# Patient Record
Sex: FEMALE | Race: BLACK OR AFRICAN AMERICAN | NOT HISPANIC OR LATINO | Employment: OTHER | URBAN - METROPOLITAN AREA
[De-identification: names, ages, dates, MRNs, and addresses within clinical notes are randomized per-mention and may not be internally consistent; named-entity substitution may affect disease eponyms.]

---

## 2015-09-12 LAB
CREATININE URINE RANDOM: 13.3 MG/DL
MICROALBUMIN, RAND UR - HISTORICAL: 9.66 MG/DL (ref 0–1.99)
MICROALBUMIN/CR RATIO URINE: 726.3 MG/DL

## 2017-01-08 ENCOUNTER — AMBULATORY - HEALTHEAST (OUTPATIENT)
Dept: GERIATRICS | Facility: CLINIC | Age: 67
End: 2017-01-08

## 2017-01-11 ENCOUNTER — AMBULATORY - HEALTHEAST (OUTPATIENT)
Dept: GERIATRICS | Facility: CLINIC | Age: 67
End: 2017-01-11

## 2017-01-25 ENCOUNTER — AMBULATORY - HEALTHEAST (OUTPATIENT)
Dept: GERIATRICS | Facility: CLINIC | Age: 67
End: 2017-01-25

## 2017-01-31 ENCOUNTER — OFFICE VISIT - HEALTHEAST (OUTPATIENT)
Dept: GERIATRICS | Facility: CLINIC | Age: 67
End: 2017-01-31

## 2017-01-31 DIAGNOSIS — N18.2 CKD (CHRONIC KIDNEY DISEASE), STAGE 2 (MILD): ICD-10-CM

## 2017-01-31 DIAGNOSIS — I10 ESSENTIAL HYPERTENSION: ICD-10-CM

## 2017-01-31 DIAGNOSIS — G62.9 NEUROPATHY: ICD-10-CM

## 2017-02-06 ENCOUNTER — TELEPHONE (OUTPATIENT)
Dept: ENDOCRINOLOGY | Facility: CLINIC | Age: 67
End: 2017-02-06

## 2017-02-15 ENCOUNTER — AMBULATORY - HEALTHEAST (OUTPATIENT)
Dept: GERIATRICS | Facility: CLINIC | Age: 67
End: 2017-02-15

## 2017-02-27 ENCOUNTER — OFFICE VISIT (OUTPATIENT)
Dept: ENDOCRINOLOGY | Facility: CLINIC | Age: 67
End: 2017-02-27

## 2017-02-27 VITALS — SYSTOLIC BLOOD PRESSURE: 138 MMHG | HEART RATE: 73 BPM | DIASTOLIC BLOOD PRESSURE: 84 MMHG | HEIGHT: 63 IN

## 2017-02-27 DIAGNOSIS — E11.8 TYPE 2 DIABETES MELLITUS WITH COMPLICATION, WITH LONG-TERM CURRENT USE OF INSULIN (H): Primary | ICD-10-CM

## 2017-02-27 DIAGNOSIS — Z79.4 TYPE 2 DIABETES MELLITUS WITH COMPLICATION, WITH LONG-TERM CURRENT USE OF INSULIN (H): Primary | ICD-10-CM

## 2017-02-27 LAB — HBA1C MFR BLD: 7.1 % (ref 4.3–6)

## 2017-02-27 ASSESSMENT — PAIN SCALES - GENERAL: PAINLEVEL: NO PAIN (0)

## 2017-02-27 NOTE — LETTER
2/27/2017       RE: Cristal Preciado  [facility]  [address]   [place] [address]     Dear Colleague,    Thank you for referring your patient, Cristal Preciado, to the [facility] ENDOCRINOLOGY at [facility]. Please see a copy of my visit note below.    HPI   Ms. Preciado returns for follow up of type 2 diabetes.  Blood sugars have been running mostly under 200 lately.  Her caretakers at [facility] have been testing her blood sugars 4 times a day.  She is currently taking Lantus 100 units in the morning and 100 units at night and Novolog 75 units at breakfast, 72 units with lunch and 68 with dinner plus sliding scale (2/20 over 180 starting at 4 units).   She is also on liraglutide 1.8 mg daily and is tolerating this fine.  She is taking gabapentin for neuropathy.  She is also on Cymbalta.  Neuropathy in feet better, but hands still bothering her.  She is seeing Dr. Grant for her feet.  She otherwise has been feeling well and in her usual state of health.       At her last visit, Ms. Preciado reports that she was frustrated.  She feels like she is not challenged at all at the nursing home and that she is expected to sit in the wheelchair most of the time.  The therapy she receives is not strengthening her legs, only her upper body and she is concerned because they will not allow her to try to walk.  She feels like her muscles are just weak and she needs exercise.  She does not like the primary care she receives at the nursing home and has been told that she should not see Dr. Benavides (as she has for several years) because it is better to see the providers at the NH.  She said that when she entered the NH 4-5 years ago she was walking and her goal is to start walking again.  She does have some ongoing back pain that is limiting her also.     ROS   GENERAL: Lost a few pounds.  No fevers, chills,  night sweats.  HEENT: no dysphagia, diplopia, neck  "pain or tenderness, dry/scratchy eyes, URI, cough, sinus drainage, tinnitus, sinus pressure.   CV: No CP, SOB.  No palpitations, skipped beats, LOC.  LUNGS: no cough, sputum production, wheezing   ABDOMEN: no diarrhea, constipation, abdominal pain  EXTREMITIES:  She has ongoing edema.  Hands and feet feel swollen.  Has thickened toenails, not cutting into skin.  No pain. She sees Dr. Grant.  Dry skin.  Leg ulcerations healed.  Callus on left heel.   NEUROLOGY: no changes in vision.  Continued tingling and numbness in hands and feet.   MSK: weakness in legs after stroke.  Needs help getting out of wheelchair. Does not use walker any more due to weakness.      PMH   Type 2 diabetes  Neuropathy   Nephropathy  Stroke - uses a walker, but mainly in wheelchair.   CAD, s/p stent   HTN  Dyslipidemia  Cataracts  GERD    Family Hx:   Mom- stroke  Dad- cancer  1 of 12 children  2 brothers and 2 sisters-  cancer- unsure of type  1 sister with diabetes  5 children  Son- MS  Daughter- MS  6 grandchildren    Social Hx:   Ms. Preciado lives at [facility].  She keeps herself busy with many activities in the day, exercises (\"light and lively\"), crafts, coloring, baking, light bowling. She has many friends in their 90's there and she enjoys their company. She is seeing her family about 1-2 times a month now.     Has 5 children, all now living in [facility].  6 grandchildren.  4 grandchildren moved south with their mother.  Originally from [place].     Current Medications  Current Outpatient Prescriptions   Medication Sig Dispense Refill     olopatadine HCl (PATADAY) 0.2 % SOLN Place 1 drop into both eyes daily as needed 1 Bottle 11     insulin glargine (LANTUS SOLOSTAR) 100 UNIT/ML PEN Take 80 units in the morning and 90 units in the evening. 60 mL 11     Insulin Lispro, Human, (HUMALOG KWIKPEN) 200 UNIT/ML soln Inject 75 units with breakfast: 65 units with lunch: 65 units with dinner plus " correction scale approx daily use: 225 units 36 mL 11     insulin pen needle (B-D U/F) 31G X 5 MM Use 5 time(s) per day.  Please dispense as BD Pen Needle Mini U/F 31G x 5  each 11     Pregabalin (LYRICA PO)        pimecrolimus (ELIDEL) 1 % cream Apply topically 2 times daily To areas of rash at ears as needed until clear. 60 g 11     triamcinolone (KENALOG) 0.1 % ointment Apply topically daily To legs under compression stockings for stasis dermatitis discoloration. 60 g 5     Elastic Bandages & Supports (JOBST KNEE HIGH COMPRESSION SM) MISC JOBST 20-30MMHG COMPRESSION SM MISC   To both legs during waking hours daily for leg swelling and venous stasis dermatitis. Do not sleep in stockings. 2 each 3     Podiatric Products (AMLACTIN FOOT CREAM THERAPY EX) Externally apply 12 % topically       artificial tears OINT ophthalmic ointment 0.25 inches At Bedtime       calcium-vitamin D 500-125 MG-UNIT TABS Take 1 tablet by mouth 2 times daily       UNABLE TO FIND MEDICATION NAME: milk of magnesia       minocycline (MINOCIN,DYNACIN) 100 MG capsule Take 1 capsule (100 mg) by mouth 2 times daily 28 capsule 5     ketoconazole (NIZORAL) 2 % shampoo To entire wet scalp and ears and then wash off after 5 minutes three times a week. 240 mL 11     clobetasol (TEMOVATE) 0.05 % external solution Apply topically 2 times daily To itchy and scaly areas of scalp as needed. 60 mL 11     ketoconazole (NIZORAL) 2 % cream Apply topically 2 times daily To affected right ear mixed with triamcinolone ointment until return to clinic. 60 g 5     triamcinolone (KENALOG) 0.1 % ointment Apply topically 2 times daily To rash on the right ear mixed with ketoconazole cream until return to clinic. 60 g 5     gabapentin (NEURONTIN) 600 MG tablet Take 600 mg in AM , 600 mg afternoon and 900 mg at HS. 90 tablet 1     DULoxetine (CYMBALTA) 30 MG capsule Take 1 capsule (30 mg) by mouth daily 30 capsule 4     clotrimazole (LOTRIMIN) 1 % cream To toenails  twice daily. 60 g 6     dorzolamide-timolol (COSOPT) 2-0.5 % ophthalmic solution Place 1 drop into both eyes 2 times daily       ranitidine (ZANTAC) 150 MG tablet Take 150 mg by mouth 2 times daily       mometasone (ELOCON) 0.1 % cream Apply sparingly to left medial ankle area daily.  Do not apply to face. 45 g 0     carboxymethylcellulose (REFRESH PLUS) 0.5 % SOLN 1 drop 3 times daily as needed.       omeprazole (PRILOSEC) 20 MG capsule Take  by mouth daily. Take one tab daily       sodium chloride (SODIUM CHLORIDE) 0.65 % nasal spray Spray 1 spray in nostril daily as needed.       lisinopril (PRINIVIL,ZESTRIL) 5 MG tablet Take 2 tablets by mouth daily. Take one tab daily/Hold for SBP < 110 90 tablet 3     COMPRESSION STOCKINGS 1 each daily. 1 each 0     latanoprost (XALATAN) 0.005 % ophthalmic solution 1 drop At Bedtime. Left eye       allopurinol (ZYLOPRIM) 100 MG tablet Take 2 tablets by mouth daily. 180 tablet 1     hydrocortisone 1 % cream Apply  topically 2 times daily.       hydrocortisone 2.5 % cream Apply  topically 2 times daily.       liraglutide (VICTOZA) 18 MG/3ML SOLN Inject 1.8 mg Subcutaneous daily. Start with 0.6 mg x 5 days, then increase to 1.2 mg x 5 days, then 1.8 mg thereafter.  Do not advance to higher dose if you have nausea. 3 Month 3     ibuprofen (ADVIL,MOTRIN) 400 MG tablet Take 400 mg by mouth every 6 hours as needed.       chlorthalidone (HYGROTEN) 12.5 MG TABS Take 12.5 mg by mouth daily.       loratadine (CLARITIN) 10 MG tablet Take 10 mg by mouth as needed.       Hypromellose (ARTIFICIAL TEARS OP) Apply  to eye as needed.       Calcium Citrate-Vitamin D (CALCIUM CITRATE + D PO) 600/400 mg/unit take 1 tabs daily twice daily.        nitroGLYCERIN (NITROSTAT) 0.4 MG SL tablet Place 0.4 mg under the tongue every 5 minutes as needed.       acetaminophen (TYLENOL) 500 MG tablet Take 1-2 tablets by mouth every 6 hours as needed. Tylenol Extra Strength.        Magnesium Hydroxide (MILK OF  "MAGNESIA PO) Take 30 cc as needed PRN constipation.       atorvastatin (LIPITOR) 80 MG tablet Take 1 tablet by mouth daily. Pt needs to have labs done prior to further refills. 90 tablet 0     folic acid (FOLVITE) 1 MG tablet Take 1 mg by mouth daily.       metoprolol (LOPRESSOR) 50 MG tablet Take 50 mg by mouth 2 times daily.       aspirin 325 MG tablet Take 325 mg by mouth daily.       clopidogrel (PLAVIX) 75 MG tablet Take 75 mg by mouth daily.         Physical Exam   /84  Pulse 73  Ht 1.6 m (5' 3\")  GENERAL: VSS.  Answering questions appropriately, appears stated age. Sitting in wheelchair.   LUNGS: CTA bilaterally. No wheezes, rales, ronchi  EXTREMITIES: Legs with TYREL hose. 1+ edema bilaterally.    RESULTS  A1C      7.5   10/24/2016  A1C      7.5   10/12/2015  A1C      8.1   3/6/2014  A1C      7.2   3/7/2013  A1C      7.7   2/13/2013    TSH   Date Value Ref Range Status   10/24/2016 1.20 0.40 - 4.00 mU/L Final   10/12/2015 1.18 0.40 - 4.00 mU/L Final   08/21/2014 1.24 0.40 - 4.00 mU/L Final     Comment:     Effective 7/30/2014, the reference range for this assay has changed to reflect   new instrumentation/methodology.     08/05/2013 1.12 0.4 - 5.0 mU/L Final   07/15/2010 0.53 0.4 - 5.0 mU/L Final     T4 Total   Date Value Ref Range Status   10/30/2008 10.6 5.0 - 11.0 ug/dL Final     T4 Free   Date Value Ref Range Status   04/21/2006 1.04 0.70 - 1.85 ng/dL Final   09/23/2005 1.58 0.70 - 1.85 ng/dL Final       Creatinine   Date Value Ref Range Status   10/24/2016 0.99 0.52 - 1.04 mg/dL Final   10/12/2015 0.88 0.52 - 1.04 mg/dL Final   ]    No results found for: MICROALBUMIN]    ALT   Date Value Ref Range Status   10/12/2015 39 0 - 50 U/L Final   03/06/2014 36 0 - 50 U/L Final   ]    Recent Labs   Lab Test  10/12/15   9746  08/21/14   0935   05/10/12   1022   CHOL   --   110   --   144   HDL   --   43*   --   46*   LDL  51  43   < >  73   TRIG   --   122   --   123   CHOLHDLRATIO   --   2.6   --   3.1    < " > = values in this interval not displayed.     Assessment/Plan:      1.  Type 2 diabetes-  Blood sugars are under good control.  Her a1c is down to 7.1%.  I think we are at target and do not need very tight control (goal is a1c <8%) based on her risk factors and results of studies including the ACCORD trial.  Asked Jake Busch to call if she starts having hypoglycemia. No other changes today.     2.  Risk factors- BP good today.  No microalbuminuria.   On ACE inhibitor.  Creatinine ok.  LDL <100 on statin.  Follows with podiatry.  On gabapentin and cymbalta for neuropathy.  Will schedule with Dr. Grant. Her nursing home wanted her to follow with the in house doctor, but she prefers to follow with Dr. Benavides for primary care.  She will contact her , as she was told by Jake Busch that she can not see Dr. Benavides despite her concern that she is not getting the care she needs.       3.  F/U in 3 months with me, sooner if she has any concerns.      >25/30 minutes were spent counseling patient    Renetta Washington PA-C, MPAS   [facility]  Department of Medicine  Division of Endocrinology and Diabetes

## 2017-02-27 NOTE — PROGRESS NOTES
HPI   Ms. Preciado returns for follow up of type 2 diabetes.  Blood sugars have been running mostly under 200 lately.  Her caretakers at [facility] have been testing her blood sugars 4 times a day.  She is currently taking Lantus 100 units in the morning and 100 units at night and Novolog 75 units at breakfast, 72 units with lunch and 68 with dinner plus sliding scale (2/20 over 180 starting at 4 units).   She is also on liraglutide 1.8 mg daily and is tolerating this fine.  She is taking gabapentin for neuropathy.  She is also on Cymbalta.  Neuropathy in feet better, but hands still bothering her.  She is seeing Dr. Grant for her feet.  She otherwise has been feeling well and in her usual state of health.       At her last visit, Ms. Preciado reports that she was frustrated.  She feels like she is not challenged at all at the nursing home and that she is expected to sit in the wheelchair most of the time.  The therapy she receives is not strengthening her legs, only her upper body and she is concerned because they will not allow her to try to walk.  She feels like her muscles are just weak and she needs exercise.  She does not like the primary care she receives at the nursing home and has been told that she should not see Dr. Benavides (as she has for several years) because it is better to see the providers at the NH.  She said that when she entered the NH 4-5 years ago she was walking and her goal is to start walking again.  She does have some ongoing back pain that is limiting her also.     ROS   GENERAL: Lost a few pounds.  No fevers, chills,  night sweats.  HEENT: no dysphagia, diplopia, neck pain or tenderness, dry/scratchy eyes, URI, cough, sinus drainage, tinnitus, sinus pressure.   CV: No CP, SOB.  No palpitations, skipped beats, LOC.  LUNGS: no cough, sputum production, wheezing   ABDOMEN: no diarrhea, constipation, abdominal pain  EXTREMITIES:  She has ongoing edema.  Hands and feet feel swollen.  Has  "thickened toenails, not cutting into skin.  No pain. She sees Dr. Grant.  Dry skin.  Leg ulcerations healed.  Callus on left heel.   NEUROLOGY: no changes in vision.  Continued tingling and numbness in hands and feet.   MSK: weakness in legs after stroke.  Needs help getting out of wheelchair. Does not use walker any more due to weakness.      PMH   Type 2 diabetes  Neuropathy   Nephropathy  Stroke 2010- uses a walker, but mainly in wheelchair.   CAD, s/p stent 2009  HTN  Dyslipidemia  Cataracts  GERD    Family Hx:   Mom- stroke  Dad- cancer  1 of 12 children  2 brothers and 2 sisters-  cancer- unsure of type  1 sister with diabetes  5 children  Son- MS  Daughter- MS  6 grandchildren    Social Hx:   Ms. Preciado lives at [facility].  She keeps herself busy with many activities in the day, exercises (\"light and lively\"), crafts, coloring, baking, light bowling. She has many friends in their 90's there and she enjoys their company. She is seeing her family about 1-2 times a month now.     Has 5 children, all now living in [facility].  6 grandchildren.  4 grandchildren moved south with their mother.  Originally from [facility] [place] [facility].     Current Medications  Current Outpatient Prescriptions   Medication Sig Dispense Refill     olopatadine HCl (PATADAY) 0.2 % SOLN Place 1 drop into both eyes daily as needed 1 Bottle 11     insulin glargine (LANTUS SOLOSTAR) 100 UNIT/ML PEN Take 80 units in the morning and 90 units in the evening. 60 mL 11     Insulin Lispro, Human, (HUMALOG KWIKPEN) 200 UNIT/ML soln Inject 75 units with breakfast: 65 units with lunch: 65 units with dinner plus correction scale approx daily use: 225 units 36 mL 11     insulin pen needle (B-D U/F) 31G X 5 MM Use 5 time(s) per day.  Please dispense as BD Pen Needle Mini U/F 31G x 5  each 11     Pregabalin (LYRICA PO)        pimecrolimus (ELIDEL) 1 % cream Apply topically 2 times daily To areas of rash at ears as needed until " clear. 60 g 11     triamcinolone (KENALOG) 0.1 % ointment Apply topically daily To legs under compression stockings for stasis dermatitis discoloration. 60 g 5     Elastic Bandages & Supports (JOBST KNEE HIGH COMPRESSION SM) MISC JOBST 20-30MMHG COMPRESSION SM MISC   To both legs during waking hours daily for leg swelling and venous stasis dermatitis. Do not sleep in stockings. 2 each 3     Podiatric Products (AMLACTIN FOOT CREAM THERAPY EX) Externally apply 12 % topically       artificial tears OINT ophthalmic ointment 0.25 inches At Bedtime       calcium-vitamin D 500-125 MG-UNIT TABS Take 1 tablet by mouth 2 times daily       UNABLE TO FIND MEDICATION NAME: milk of magnesia       minocycline (MINOCIN,DYNACIN) 100 MG capsule Take 1 capsule (100 mg) by mouth 2 times daily 28 capsule 5     ketoconazole (NIZORAL) 2 % shampoo To entire wet scalp and ears and then wash off after 5 minutes three times a week. 240 mL 11     clobetasol (TEMOVATE) 0.05 % external solution Apply topically 2 times daily To itchy and scaly areas of scalp as needed. 60 mL 11     ketoconazole (NIZORAL) 2 % cream Apply topically 2 times daily To affected right ear mixed with triamcinolone ointment until return to clinic. 60 g 5     triamcinolone (KENALOG) 0.1 % ointment Apply topically 2 times daily To rash on the right ear mixed with ketoconazole cream until return to clinic. 60 g 5     gabapentin (NEURONTIN) 600 MG tablet Take 600 mg in AM , 600 mg afternoon and 900 mg at HS. 90 tablet 1     DULoxetine (CYMBALTA) 30 MG capsule Take 1 capsule (30 mg) by mouth daily 30 capsule 4     clotrimazole (LOTRIMIN) 1 % cream To toenails twice daily. 60 g 6     dorzolamide-timolol (COSOPT) 2-0.5 % ophthalmic solution Place 1 drop into both eyes 2 times daily       ranitidine (ZANTAC) 150 MG tablet Take 150 mg by mouth 2 times daily       mometasone (ELOCON) 0.1 % cream Apply sparingly to left medial ankle area daily.  Do not apply to face. 45 g 0      carboxymethylcellulose (REFRESH PLUS) 0.5 % SOLN 1 drop 3 times daily as needed.       omeprazole (PRILOSEC) 20 MG capsule Take  by mouth daily. Take one tab daily       sodium chloride (SODIUM CHLORIDE) 0.65 % nasal spray Spray 1 spray in nostril daily as needed.       lisinopril (PRINIVIL,ZESTRIL) 5 MG tablet Take 2 tablets by mouth daily. Take one tab daily/Hold for SBP < 110 90 tablet 3     COMPRESSION STOCKINGS 1 each daily. 1 each 0     latanoprost (XALATAN) 0.005 % ophthalmic solution 1 drop At Bedtime. Left eye       allopurinol (ZYLOPRIM) 100 MG tablet Take 2 tablets by mouth daily. 180 tablet 1     hydrocortisone 1 % cream Apply  topically 2 times daily.       hydrocortisone 2.5 % cream Apply  topically 2 times daily.       liraglutide (VICTOZA) 18 MG/3ML SOLN Inject 1.8 mg Subcutaneous daily. Start with 0.6 mg x 5 days, then increase to 1.2 mg x 5 days, then 1.8 mg thereafter.  Do not advance to higher dose if you have nausea. 3 Month 3     ibuprofen (ADVIL,MOTRIN) 400 MG tablet Take 400 mg by mouth every 6 hours as needed.       chlorthalidone (HYGROTEN) 12.5 MG TABS Take 12.5 mg by mouth daily.       loratadine (CLARITIN) 10 MG tablet Take 10 mg by mouth as needed.       Hypromellose (ARTIFICIAL TEARS OP) Apply  to eye as needed.       Calcium Citrate-Vitamin D (CALCIUM CITRATE + D PO) 600/400 mg/unit take 1 tabs daily twice daily.        nitroGLYCERIN (NITROSTAT) 0.4 MG SL tablet Place 0.4 mg under the tongue every 5 minutes as needed.       acetaminophen (TYLENOL) 500 MG tablet Take 1-2 tablets by mouth every 6 hours as needed. Tylenol Extra Strength.        Magnesium Hydroxide (MILK OF MAGNESIA PO) Take 30 cc as needed PRN constipation.       atorvastatin (LIPITOR) 80 MG tablet Take 1 tablet by mouth daily. Pt needs to have labs done prior to further refills. 90 tablet 0     folic acid (FOLVITE) 1 MG tablet Take 1 mg by mouth daily.       metoprolol (LOPRESSOR) 50 MG tablet Take 50 mg by mouth 2  "times daily.       aspirin 325 MG tablet Take 325 mg by mouth daily.       clopidogrel (PLAVIX) 75 MG tablet Take 75 mg by mouth daily.         Physical Exam   /84  Pulse 73  Ht 1.6 m (5' 3\")  GENERAL: VSS.  Answering questions appropriately, appears stated age. Sitting in wheelchair.   LUNGS: CTA bilaterally. No wheezes, rales, ronchi  EXTREMITIES: Legs with TYREL hose. 1+ edema bilaterally.    RESULTS  A1C      7.5   10/24/2016  A1C      7.5   10/12/2015  A1C      8.1   3/6/2014  A1C      7.2   3/7/2013  A1C      7.7   2/13/2013    TSH   Date Value Ref Range Status   10/24/2016 1.20 0.40 - 4.00 mU/L Final   10/12/2015 1.18 0.40 - 4.00 mU/L Final   08/21/2014 1.24 0.40 - 4.00 mU/L Final     Comment:     Effective 7/30/2014, the reference range for this assay has changed to reflect   new instrumentation/methodology.     08/05/2013 1.12 0.4 - 5.0 mU/L Final   07/15/2010 0.53 0.4 - 5.0 mU/L Final     T4 Total   Date Value Ref Range Status   10/30/2008 10.6 5.0 - 11.0 ug/dL Final     T4 Free   Date Value Ref Range Status   04/21/2006 1.04 0.70 - 1.85 ng/dL Final   09/23/2005 1.58 0.70 - 1.85 ng/dL Final       Creatinine   Date Value Ref Range Status   10/24/2016 0.99 0.52 - 1.04 mg/dL Final   10/12/2015 0.88 0.52 - 1.04 mg/dL Final   ]    No results found for: MICROALBUMIN]    ALT   Date Value Ref Range Status   10/12/2015 39 0 - 50 U/L Final   03/06/2014 36 0 - 50 U/L Final   ]    Recent Labs   Lab Test  10/12/15   1626  08/21/14   0935   05/10/12   1022   CHOL   --   110   --   144   HDL   --   43*   --   46*   LDL  51  43   < >  73   TRIG   --   122   --   123   CHOLHDLRATIO   --   2.6   --   3.1    < > = values in this interval not displayed.     Assessment/Plan:      1.  Type 2 diabetes-  Blood sugars are under good control.  Her a1c is down to 7.1%.  I think we are at target and do not need very tight control (goal is a1c <8%) based on her risk factors and results of studies including the ACCORD trial.  Asked " Good Narayan to call if she starts having hypoglycemia. No other changes today.     2.  Risk factors- BP good today.  No microalbuminuria.   On ACE inhibitor.  Creatinine ok.  LDL <100 on statin.  Follows with podiatry.  On gabapentin and cymbalta for neuropathy.  Will schedule with Dr. Grant. Her nursing home wanted her to follow with the in house doctor, but she prefers to follow with Dr. Benavides for primary care.  She will contact her , as she was told by Jake Busch that she can not see Dr. Benavides despite her concern that she is not getting the care she needs.       3.  F/U in 3 months with me, sooner if she has any concerns.      >25/30 minutes were spent counseling patient    Renetta Washington PA-C, MPAS   [facility]  Department of Medicine  Division of Endocrinology and Diabetes

## 2017-02-27 NOTE — PATIENT INSTRUCTIONS
To expedite your medication refill(s), please contact your pharmacy and have them fax a refill request to: [phone].  *Please allow 3 business days for routine medication refills.  *Please allow 5 business days for controlled substance medication refills.  --------------------  For scheduling appointments (including lab work), please request an appointment through [employer], or call: [phone].    For questions for your provider or the endocrine nurse, please send a [employer] message.  For after-hours urgent issues, please dial [phone], and ask to speak with the Endocrinologist On-Call.  --------------------  Please Note: If you are active on [employer], all future test results will be sent by [employer] message only and will no longer be sent by mail. You may also receive communication directly from your physician.

## 2017-02-27 NOTE — MR AVS SNAPSHOT
After Visit Summary   2/27/2017    Cristal Preciado    MRN: [id number]           Patient Information     Date Of Birth          1950        Visit Information        Provider Department      2/27/2017 10:00 AM Renetta Washington PA-C [facility] Endocrinology        Today's Diagnoses     Type 2 diabetes mellitus with complication, with long-term current use of insulin (H)    -  1      Care Instructions    To expedite your medication refill(s), please contact your pharmacy and have them fax a refill request to: [phone].  *Please allow 3 business days for routine medication refills.  *Please allow 5 business days for controlled substance medication refills.  --------------------  For scheduling appointments (including lab work), please request an appointment through CohesiveFT, or call: [phone].    For questions for your provider or the endocrine nurse, please send a CohesiveFT message.  For after-hours urgent issues, please dial [phone], and ask to speak with the Endocrinologist On-Call.  --------------------  Please Note: If you are active on CohesiveFT, all future test results will be sent by CohesiveFT message only and will no longer be sent by mail. You may also receive communication directly from your physician.          Follow-ups after your visit        Follow-up notes from your care team     Return in about 3 months (around 5/27/2017).      Your next 10 appointments already scheduled     Mar 09, 2017  2:30 PM CST   (Arrive by 2:15 PM)   Return Visit with Vu Grant DPM   [facility] Endocrinology [facility])    [address] [place] [id number] [phone]            Jun 05, 2017 10:00 AM CDT   (Arrive by 9:45 AM)   RETURN DIABETES with JODY Tapia [facility] Endocrinology [facility])    [address] [place] [address] [phone]              Who to contact     Please  "call your clinic at [phone] to:    Ask questions about your health    Make or cancel appointments    Discuss your medicines    Learn about your test results    Speak to your doctor   If you have compliments or concerns about an experience at your clinic, or if you wish to file a complaint, please contact [facility] Patient Relations at [phone] or email us at [email or web address]         Additional Information About Your Visit        I and love and youharVadio Information     ClassWallet is an electronic gateway that provides easy, online access to your medical records. With ClassWallet, you can request a clinic appointment, read your test results, renew a prescription or communicate with your care team.     To sign up for ClassWallet visit the website at [email or web address]   You will be asked to enter the access code listed below, as well as some personal information. Please follow the directions to create your username and password.     Your access code is: [email or web address]  Expires: 2017  6:30 AM     Your access code will  in 90 days. If you need help or a new code, please contact your [facility] or call [phone] for assistance.        Care EveryWhere ID     This is your Care EveryWhere ID. This could be used by other organizations to access your [facility] medical records  [id number]        Your Vitals Were     Pulse Height                73 1.6 m (5' 3\")           Blood Pressure from Last 3 Encounters:   17 138/84   10/24/16 119/77   16 126/60    Weight from Last 3 Encounters:   16 106.5 kg (234 lb 12.8 oz)   03/02/15 105.7 kg (233 lb)   14 111.6 kg (246 lb)              Today, you had the following     No orders found for display       Primary Care Provider Office Phone # Fax #    Meagan Valdez [phone]       [facility] [address] [place] [address]        Thank you!     " Thank you for choosing [facility] ENDOCRINOLOGY  for your care. Our goal is always to provide you with excellent care. Hearing back from our patients is one way we can continue to improve our services. Please take a few minutes to complete the written survey that you may receive in the mail after your visit with us. Thank you!             Your Updated Medication List - Protect others around you: Learn how to safely use, store and throw away your medicines at www.disposemymeds.org.          This list is accurate as of: 2/27/17 11:17 AM.  Always use your most recent med list.                   Brand Name Dispense Instructions for use    acetaminophen 500 MG tablet    TYLENOL     Take 1-2 tablets by mouth every 6 hours as needed. Tylenol Extra Strength.       allopurinol 100 MG tablet    ZYLOPRIM    180 tablet    Take 2 tablets by mouth daily.       AMLACTIN FOOT CREAM THERAPY EX      Externally apply 12 % topically       artificial tears Oint ophthalmic ointment      0.25 inches At Bedtime       ARTIFICIAL TEARS OP      Apply  to eye as needed.       aspirin 325 MG tablet      Take 325 mg by mouth daily.       atorvastatin 80 MG tablet    LIPITOR    90 tablet    Take 1 tablet by mouth daily. Pt needs to have labs done prior to further refills.       CALCIUM CITRATE + D PO      600/400 mg/unit take 1 tabs daily twice daily.       calcium-vitamin D 500-125 MG-UNIT Tabs      Take 1 tablet by mouth 2 times daily       carboxymethylcellulose 0.5 % Soln ophthalmic solution    REFRESH PLUS     1 drop 3 times daily as needed.       chlorthalidone 12.5 MG Tabs half-tab    HYGROTON     Take 12.5 mg by mouth daily.       CLARITIN 10 MG tablet   Generic drug:  loratadine      Take 10 mg by mouth as needed.       clobetasol 0.05 % external solution    TEMOVATE    60 mL    Apply topically 2 times daily To itchy and scaly areas of scalp as needed.       clotrimazole 1 % cream    LOTRIMIN    60 g    To toenails twice daily.        COMPRESSION STOCKINGS     1 each    1 each daily.       dorzolamide-timolol 2-0.5 % ophthalmic solution    COSOPT     Place 1 drop into both eyes 2 times daily       DULoxetine 30 MG EC capsule    CYMBALTA    30 capsule    Take 1 capsule (30 mg) by mouth daily       folic acid 1 MG tablet    FOLVITE     Take 1 mg by mouth daily.       gabapentin 600 MG tablet    NEURONTIN    90 tablet    Take 600 mg in AM , 600 mg afternoon and 900 mg at HS.       * hydrocortisone 1 % cream    CORTAID     Apply  topically 2 times daily.       * hydrocortisone 2.5 % cream      Apply  topically 2 times daily.       ibuprofen 400 MG tablet    ADVIL/MOTRIN     Take 400 mg by mouth every 6 hours as needed.       insulin glargine 100 UNIT/ML injection    LANTUS SOLOSTAR    60 mL    Take 80 units in the morning and 90 units in the evening.       Insulin Lispro 200 UNIT/ML soln    HUMALOG KWIKPEN    36 mL    Inject 75 units with breakfast: 65 units with lunch: 65 units with dinner plus correction scale approx daily use: 225 units       insulin pen needle 31G X 5 MM    B-D U/F    150 each    Use 5 time(s) per day.  Please dispense as BD Pen Needle Mini U/F 31G x 5 MM       JOBST KNEE HIGH COMPRESSION SM Misc     2 each    JOBST 20-30MMHG COMPRESSION SM MISC  To both legs during waking hours daily for leg swelling and venous stasis dermatitis. Do not sleep in stockings.       * ketoconazole 2 % shampoo    NIZORAL    240 mL    To entire wet scalp and ears and then wash off after 5 minutes three times a week.       * ketoconazole 2 % cream    NIZORAL    60 g    Apply topically 2 times daily To affected right ear mixed with triamcinolone ointment until return to clinic.       liraglutide 18 MG/3ML Soln    VICTOZA    3 Month    Inject 1.8 mg Subcutaneous daily. Start with 0.6 mg x 5 days, then increase to 1.2 mg x 5 days, then 1.8 mg thereafter.  Do not advance to higher dose if you have nausea.       lisinopril 5 MG tablet    PRINIVIL/ZESTRIL     90 tablet    Take 2 tablets by mouth daily. Take one tab daily/Hold for SBP < 110       LYRICA PO          metoprolol 50 MG tablet    LOPRESSOR     Take 50 mg by mouth 2 times daily.       MILK OF MAGNESIA PO      Take 30 cc as needed PRN constipation.       minocycline 100 MG capsule    MINOCIN/DYNACIN    28 capsule    Take 1 capsule (100 mg) by mouth 2 times daily       mometasone 0.1 % cream    ELOCON    45 g    Apply sparingly to left medial ankle area daily.  Do not apply to face.       nitroglycerin 0.4 MG sublingual tablet    NITROSTAT     Place 0.4 mg under the tongue every 5 minutes as needed.       olopatadine HCl 0.2 % Soln    PATADAY    1 Bottle    Place 1 drop into both eyes daily as needed       omeprazole 20 MG CR capsule    priLOSEC     Take  by mouth daily. Take one tab daily       pimecrolimus 1 % cream    ELIDEL    60 g    Apply topically 2 times daily To areas of rash at ears as needed until clear.       PLAVIX 75 MG tablet   Generic drug:  clopidogrel      Take 75 mg by mouth daily.       ranitidine 150 MG tablet    ZANTAC     Take 150 mg by mouth 2 times daily       sodium chloride 0.65 % nasal spray    OCEAN     Spray 1 spray in nostril daily as needed.       * triamcinolone 0.1 % ointment    KENALOG    60 g    Apply topically 2 times daily To rash on the right ear mixed with ketoconazole cream until return to clinic.       * triamcinolone 0.1 % ointment    KENALOG    60 g    Apply topically daily To legs under compression stockings for stasis dermatitis discoloration.       UNABLE TO FIND      MEDICATION NAME: milk of magnesia       XALATAN 0.005 % ophthalmic solution   Generic drug:  latanoprost      1 drop At Bedtime. Left eye       * Notice:  This list has 6 medication(s) that are the same as other medications prescribed for you. Read the directions carefully, and ask your doctor or other care provider to review them with you.

## 2017-02-28 ENCOUNTER — OFFICE VISIT - HEALTHEAST (OUTPATIENT)
Dept: GERIATRICS | Facility: CLINIC | Age: 67
End: 2017-02-28

## 2017-02-28 DIAGNOSIS — E11.42 DIABETIC PERIPHERAL NEUROPATHY ASSOCIATED WITH TYPE 2 DIABETES MELLITUS (H): ICD-10-CM

## 2017-02-28 DIAGNOSIS — N18.2 CKD (CHRONIC KIDNEY DISEASE), STAGE 2 (MILD): ICD-10-CM

## 2017-02-28 DIAGNOSIS — E78.5 HYPERLIPIDEMIA: ICD-10-CM

## 2017-02-28 DIAGNOSIS — I10 ESSENTIAL HYPERTENSION WITH GOAL BLOOD PRESSURE LESS THAN 140/90: ICD-10-CM

## 2017-02-28 DIAGNOSIS — M10.9 GOUT, UNSPECIFIED CAUSE, UNSPECIFIED CHRONICITY, UNSPECIFIED SITE: ICD-10-CM

## 2017-02-28 DIAGNOSIS — E11.9 TYPE 2 DIABETES MELLITUS (H): ICD-10-CM

## 2017-02-28 DIAGNOSIS — F32.A DEPRESSION: ICD-10-CM

## 2017-02-28 DIAGNOSIS — I25.10 CAD (CORONARY ARTERY DISEASE): ICD-10-CM

## 2017-02-28 DIAGNOSIS — R60.0 BILATERAL LOWER EXTREMITY EDEMA: ICD-10-CM

## 2017-02-28 ASSESSMENT — MIFFLIN-ST. JEOR: SCORE: 1550.1

## 2017-03-01 ENCOUNTER — AMBULATORY - HEALTHEAST (OUTPATIENT)
Dept: GERIATRICS | Facility: CLINIC | Age: 67
End: 2017-03-01

## 2017-03-09 ENCOUNTER — OFFICE VISIT (OUTPATIENT)
Dept: ENDOCRINOLOGY | Facility: CLINIC | Age: 67
End: 2017-03-09

## 2017-03-09 DIAGNOSIS — E11.40 DIABETIC NEUROPATHY WITH NEUROLOGIC COMPLICATION (H): ICD-10-CM

## 2017-03-09 DIAGNOSIS — R60.0 PERIPHERAL EDEMA: Primary | ICD-10-CM

## 2017-03-09 DIAGNOSIS — B35.1 DERMATOPHYTOSIS OF NAIL: ICD-10-CM

## 2017-03-09 DIAGNOSIS — E11.49 DIABETIC NEUROPATHY WITH NEUROLOGIC COMPLICATION (H): ICD-10-CM

## 2017-03-09 DIAGNOSIS — L60.2 ONYCHAUXIS: ICD-10-CM

## 2017-03-09 NOTE — MR AVS SNAPSHOT
After Visit Summary   3/9/2017    Cristal Preciado    MRN: [id number]           Patient Information     Date Of Birth          1950        Visit Information        Provider Department      3/9/2017 2:30 PM Vu Grant DPM [facility] End[facility]        Today's Diagnoses     Peripheral edema    -  1    Onychauxis        Dermatophytosis of nail        Diabetic neuropathy with neurologic complication (H)           Follow-ups after your visit        Your next 10 appointments already scheduled     Jun 05, 2017 10:00 AM CDT   (Arrive by 9:45 AM)   RETURN DIABETES with JODY Tapia [facility] Endocrinology ([facility])    [address]  [address] [facility] [id number] [phone]            Jun 08, 2017  1:00 PM CDT   (Arrive by 12:45 PM)   Return Visit with MIRTHA Chavez [facility] Endocrinology ([facility])    [address] [address] [phone]              Who to contact     Please call your clinic at [phone] to:    Ask questions about your health    Make or cancel appointments    Discuss your medicines    Learn about your test results    Speak to your doctor   If you have compliments or concerns about an experience at your clinic, or if you wish to file a complaint, please contact [facility] Patient Relations at [phone] or email us at [email or web address]         Additional Information About Your Visit        MyChart Information     Vtrimt is an electronic gateway that provides easy, online access to your medical records. With Soonr, you can request a clinic appointment, read your test results, renew a prescription or communicate with your care team.     To sign up for Vtrimt visit the website at [email or web address]   You will be asked to enter the access code listed below, as well as some personal information. Please  follow the directions to create your username and password.     Your access code is: [email or web address]  Expires: 2017  6:30 AM     Your access code will  in 90 days. If you need help or a new code, please contact your [facility] or call [phone] for assistance.        Care EveryWhere ID     This is your Care EveryWhere ID. This could be used by other organizations to access your [place] medical records  [id number]         Blood Pressure from Last 3 Encounters:   17 138/84   10/24/16 119/77   16 126/60    Weight from Last 3 Encounters:   16 106.5 kg (234 lb 12.8 oz)   03/02/15 105.7 kg (233 lb)   14 111.6 kg (246 lb)              We Performed the Following     DEBRIDEMENT OF NAIL(S), 1-5          Today's Medication Changes          These changes are accurate as of: 3/9/17 11:59 PM.  If you have any questions, ask your nurse or doctor.               Start taking these medicines.        Dose/Directions    order for DME   Used for:  Peripheral edema        Dose:  1 Device   1 Device by Device route daily Knee high compression socks.   Quantity:  1 Device   Refills:  5            Where to get your medicines      Some of these will need a paper prescription and others can be bought over the counter.  Ask your nurse if you have questions.     Bring a paper prescription for each of these medications     order for DME                Primary Care Provider Office Phone # Fax #    Meagan CORNELIUS José Miguel [phone]       [facility] AFTER HOURS [address] [place] [address]        Thank you!     Thank you for choosing [facility]  for your care. Our goal is always to provide you with excellent care. Hearing back from our patients is one way we can continue to improve our services. Please take a few minutes to complete the written survey that you may receive in the mail after your visit with us. Thank you!             Your Updated  Medication List - Protect others around you: Learn how to safely use, store and throw away your medicines at www.disposemymeds.org.          This list is accurate as of: 3/9/17 11:59 PM.  Always use your most recent med list.                   Brand Name Dispense Instructions for use    acetaminophen 500 MG tablet    TYLENOL     Take 1-2 tablets by mouth every 6 hours as needed. Tylenol Extra Strength.       allopurinol 100 MG tablet    ZYLOPRIM    180 tablet    Take 2 tablets by mouth daily.       AMLACTIN FOOT CREAM THERAPY EX      Externally apply 12 % topically       artificial tears Oint ophthalmic ointment      0.25 inches At Bedtime       ARTIFICIAL TEARS OP      Apply  to eye as needed.       aspirin 325 MG tablet      Take 325 mg by mouth daily.       atorvastatin 80 MG tablet    LIPITOR    90 tablet    Take 1 tablet by mouth daily. Pt needs to have labs done prior to further refills.       CALCIUM CITRATE + D PO      600/400 mg/unit take 1 tabs daily twice daily.       calcium-vitamin D 500-125 MG-UNIT Tabs      Take 1 tablet by mouth 2 times daily       carboxymethylcellulose 0.5 % Soln ophthalmic solution    REFRESH PLUS     1 drop 3 times daily as needed.       chlorthalidone 12.5 MG Tabs half-tab    HYGROTON     Take 12.5 mg by mouth daily.       CLARITIN 10 MG tablet   Generic drug:  loratadine      Take 10 mg by mouth as needed.       clobetasol 0.05 % external solution    TEMOVATE    60 mL    Apply topically 2 times daily To itchy and scaly areas of scalp as needed.       clotrimazole 1 % cream    LOTRIMIN    60 g    To toenails twice daily.       COMPRESSION STOCKINGS     1 each    1 each daily.       dorzolamide-timolol 2-0.5 % ophthalmic solution    COSOPT     Place 1 drop into both eyes 2 times daily       DULoxetine 30 MG EC capsule    CYMBALTA    30 capsule    Take 1 capsule (30 mg) by mouth daily       folic acid 1 MG tablet    FOLVITE     Take 1 mg by mouth daily.       gabapentin 600 MG  tablet    NEURONTIN    90 tablet    Take 600 mg in AM , 600 mg afternoon and 900 mg at HS.       * hydrocortisone 1 % cream    CORTAID     Apply  topically 2 times daily.       * hydrocortisone 2.5 % cream      Apply  topically 2 times daily.       ibuprofen 400 MG tablet    ADVIL/MOTRIN     Take 400 mg by mouth every 6 hours as needed.       insulin glargine 100 UNIT/ML injection    LANTUS SOLOSTAR    60 mL    Take 80 units in the morning and 90 units in the evening.       Insulin Lispro 200 UNIT/ML soln    HUMALOG KWIKPEN    36 mL    Inject 75 units with breakfast: 65 units with lunch: 65 units with dinner plus correction scale approx daily use: 225 units       insulin pen needle 31G X 5 MM    B-D U/F    150 each    Use 5 time(s) per day.  Please dispense as BD Pen Needle Mini U/F 31G x 5 MM       JOBST KNEE HIGH COMPRESSION SM Misc     2 each    JOBST 20-30MMHG COMPRESSION SM MISC  To both legs during waking hours daily for leg swelling and venous stasis dermatitis. Do not sleep in stockings.       * ketoconazole 2 % shampoo    NIZORAL    240 mL    To entire wet scalp and ears and then wash off after 5 minutes three times a week.       * ketoconazole 2 % cream    NIZORAL    60 g    Apply topically 2 times daily To affected right ear mixed with triamcinolone ointment until return to clinic.       liraglutide 18 MG/3ML Soln    VICTOZA    3 Month    Inject 1.8 mg Subcutaneous daily. Start with 0.6 mg x 5 days, then increase to 1.2 mg x 5 days, then 1.8 mg thereafter.  Do not advance to higher dose if you have nausea.       lisinopril 5 MG tablet    PRINIVIL/ZESTRIL    90 tablet    Take 2 tablets by mouth daily. Take one tab daily/Hold for SBP < 110       LYRICA PO          metoprolol 50 MG tablet    LOPRESSOR     Take 50 mg by mouth 2 times daily.       MILK OF MAGNESIA PO      Take 30 cc as needed PRN constipation.       minocycline 100 MG capsule    MINOCIN/DYNACIN    28 capsule    Take 1 capsule (100 mg) by mouth  2 times daily       mometasone 0.1 % cream    ELOCON    45 g    Apply sparingly to left medial ankle area daily.  Do not apply to face.       nitroglycerin 0.4 MG sublingual tablet    NITROSTAT     Place 0.4 mg under the tongue every 5 minutes as needed.       olopatadine HCl 0.2 % Soln    PATADAY    1 Bottle    Place 1 drop into both eyes daily as needed       omeprazole 20 MG CR capsule    priLOSEC     Take  by mouth daily. Take one tab daily       order for DME     1 Device    1 Device by Device route daily Knee high compression socks.       pimecrolimus 1 % cream    ELIDEL    60 g    Apply topically 2 times daily To areas of rash at ears as needed until clear.       PLAVIX 75 MG tablet   Generic drug:  clopidogrel      Take 75 mg by mouth daily.       ranitidine 150 MG tablet    ZANTAC     Take 150 mg by mouth 2 times daily       sodium chloride 0.65 % nasal spray    OCEAN     Spray 1 spray in nostril daily as needed.       * triamcinolone 0.1 % ointment    KENALOG    60 g    Apply topically 2 times daily To rash on the right ear mixed with ketoconazole cream until return to clinic.       * triamcinolone 0.1 % ointment    KENALOG    60 g    Apply topically daily To legs under compression stockings for stasis dermatitis discoloration.       UNABLE TO FIND      MEDICATION NAME: milk of magnesia       XALATAN 0.005 % ophthalmic solution   Generic drug:  latanoprost      1 drop At Bedtime. Left eye       * Notice:  This list has 6 medication(s) that are the same as other medications prescribed for you. Read the directions carefully, and ask your doctor or other care provider to review them with you.

## 2017-03-09 NOTE — PROGRESS NOTES
Past Medical History   Diagnosis Date     AION (anterior ischaemic optic neuropathy), left eye      NAION LE     CAD (coronary artery disease) 3/2009     [facility]; Angio 2013 [facility]- normal coronary arteries     Cataract      CVA (cerebral vascular accident) (H)      admitted at [facility]     Depression      on Cymbalta     Diabetes mellitus, type 2 (H)      Diabetic nephropathy (H)      Diabetic neuropathy (H)      severe     Diabetic retinopathy (H)      GERD (gastroesophageal reflux disease)      Hyperlipidemia      Hypertension      ECHO 2013, TDS, NL EF     POAG (primary open-angle glaucoma)      adv BE     Seasonal allergies      Tubular adenoma of colon 2013     repeat colonoscopy in 2018     Patient Active Problem List   Diagnosis     Cataract     CAD (coronary artery disease)     Diabetes mellitus, type 2 (H)     Diabetic nephropathy (H)     Diabetic neuropathy (H)     Diabetic retinopathy (H)     GERD (gastroesophageal reflux disease)     Glaucoma     Hypertension     Hyperlipidemia     CVA (cerebral vascular accident) (H)     Morbid obesity (H)     Anemia     Seasonal allergies     Depression     Tubular adenoma of colon     Leg weakness     Dermatitis, seborrheic     Eczematous dermatitis     History of coronary artery disease     Venous stasis dermatitis of both lower extremities     Uncontrolled type 2 diabetes mellitus (H)     Past Surgical History   Procedure Laterality Date     Extracapsular cataract extration with intraocular lens implant  11-10-09, 2-9-10     11-10-09 Lt, 2-9-10 Rt; Left eye 8/2012     Colonoscopy  7/15/2013     Tubular adenoma; repeat in 2018;Procedure: COMBINED COLONOSCOPY, SINGLE BIOPSY/POLYPECTOMY BY BIOPSY;;  Surgeon: Don King MD;  Tubular adenoma     Social History     Social History     Marital status: Single     Spouse name: N/A     Number of children: N/A     Years of education: N/A     Occupational History     Not on file.     Social History Main  Topics     Smoking status: Former Smoker     Quit date: 3/6/2009     Smokeless tobacco: Never Used     Alcohol use No     Drug use: No     Sexual activity: No     Other Topics Concern     Not on file     Social History Narrative    Pt has three daughters and two sons, single.  Her daughter Court, see's her often at the Nursing Home ([facility]).  Moved into Nursing Home in October 2011 after a hospitalization for ALY.  Moved from [place] in 2002 to [facility]. Has 5 grandchildren.     Family History   Problem Relation Age of Onset     Cancer - colorectal       CEREBROVASCULAR DISEASE       Hypertension Mother      CEREBROVASCULAR DISEASE Mother      Glaucoma Father      CANCER Father      DIABETES Sister      Glaucoma Sister      Lab Results   Component Value Date    A1C 7.5 10/24/2016    Last Basic Metabolic Panel:  Lab Results   Component Value Date     10/24/2016      Lab Results   Component Value Date    POTASSIUM 4.7 10/24/2016     Lab Results   Component Value Date    CHLORIDE 107 10/24/2016     Lab Results   Component Value Date    OLAF 9.6 10/24/2016     Lab Results   Component Value Date    CO2 26 10/24/2016     Lab Results   Component Value Date    BUN 16 10/24/2016     Lab Results   Component Value Date    CR 0.99 10/24/2016     Lab Results   Component Value Date     10/24/2016     SUBJECTIVE FINDINGS:  A 66-year-old female returns to clinic for onychauxis and onychomycosis and diabetic foot check.  She relates she is going okay.  She is wearing her diabetic shoes.  She relates she has neuropathy and numbness and tingling in her feet.  No ulcers or sores since I have seen her last.  No injuries, but her left hallux nail did split and she is concerned about that.  She also relates her compression socks are wearing, and she could use some new ones of those.      OBJECTIVE FINDINGS:  DP and PT are 1/4 bilaterally.  She has peripheral edema bilaterally.  There is no erythema, no odor, no  calor, no drainage bilaterally.  Sharp/dull is decreased with 5.07 [place]-Karthik monofilament bilaterally.  Deep tendon reflexes are intact bilaterally.  She has decreased ankle joint dorsiflexion bilaterally.  She has dorsally contracted digits bilaterally.  She hs dystrophic thickened nails with subungual debris, brittleness and discoloration and dystrophy 1-5 bilaterally to differing degrees.  She has a split left hallux nail.        ASSESSMENT AND PLAN: Onychomycosis bilaterally.  Onychauxis bilaterally.  She is diabetic with peripheral neuropathy.  It looks like her left hallux nail caught and tore up a little bit.  It could be from the sock or shoe or even a sheet, hard to say.  Diagnosis and treatment were discussed with her.  All of the nails were debrided or reduced bilaterally upon consent.  Left hallux nail bled a bit upon debridement.  Local wound care done.  Band-Aid and bacitracin and use discussed with her.  Prescription for new compression socks given and use discussed with her with edema and venous stasis.  She will return to clinic to see me in about 3 months.

## 2017-03-09 NOTE — NURSING NOTE
Chief Complaint   Patient presents with     RECHECK     F/U DIABETIC FOOT CARE     Alma Daniels, [facility]  Endocrinology & Diabetes 3G

## 2017-03-09 NOTE — LETTER
3/9/2017       RE: Cristal Preciado  [facility]  [address]   [place] [address]     Dear Colleague,    Thank you for referring your patient, Cristal Preciado, to the [facility] ENDOCRINOLOGY at [facility]. Please see a copy of my visit note below.    Past Medical History   Diagnosis Date     AION (anterior ischaemic optic neuropathy), left eye      NAION LE     CAD (coronary artery disease) 3/2009     [facility]; Angio 2013 [facility]- normal coronary arteries     Cataract      CVA (cerebral vascular accident) (H)      admitted at [facility]     Depression      on Cymbalta     Diabetes mellitus, type 2 (H)      Diabetic nephropathy (H)      Diabetic neuropathy (H)      severe     Diabetic retinopathy (H)      GERD (gastroesophageal reflux disease)      Hyperlipidemia      Hypertension      ECHO 2013, TDS, NL EF     POAG (primary open-angle glaucoma)      adv BE     Seasonal allergies      Tubular adenoma of colon 2013     repeat colonoscopy in 2018     Patient Active Problem List   Diagnosis     Cataract     CAD (coronary artery disease)     Diabetes mellitus, type 2 (H)     Diabetic nephropathy (H)     Diabetic neuropathy (H)     Diabetic retinopathy (H)     GERD (gastroesophageal reflux disease)     Glaucoma     Hypertension     Hyperlipidemia     CVA (cerebral vascular accident) (H)     Morbid obesity (H)     Anemia     Seasonal allergies     Depression     Tubular adenoma of colon     Leg weakness     Dermatitis, seborrheic     Eczematous dermatitis     History of coronary artery disease     Venous stasis dermatitis of both lower extremities     Uncontrolled type 2 diabetes mellitus (H)     Past Surgical History   Procedure Laterality Date     Extracapsular cataract extration with intraocular lens implant  11-10-09, 2-9-10     11-10-09 Lt, 2-9-10 Rt; Left eye 8/2012     Colonoscopy  7/15/2013     Tubular adenoma; repeat in 2018;Procedure: COMBINED  COLONOSCOPY, SINGLE BIOPSY/POLYPECTOMY BY BIOPSY;;  Surgeon: Don King MD;  Tubular adenoma     Social History     Social History     Marital status: Single     Spouse name: N/A     Number of children: N/A     Years of education: N/A     Occupational History     Not on file.     Social History Main Topics     Smoking status: Former Smoker     Quit date: 3/6/2009     Smokeless tobacco: Never Used     Alcohol use No     Drug use: No     Sexual activity: No     Other Topics Concern     Not on file     Social History Narrative    Pt has three daughters and two sons, single.  Her daughter Court, see's her often at the Nursing Home ([facility]).  Moved into Nursing Home in October 2011 after a hospitalization for ALY.  M[place] from [place]. Has 5 grandchildren.     Family History   Problem Relation Age of Onset     Cancer - colorectal       CEREBROVASCULAR DISEASE       Hypertension Mother      CEREBROVASCULAR DISEASE Mother      Glaucoma Father      CANCER Father      DIABETES Sister      Glaucoma Sister      Lab Results   Component Value Date    A1C 7.5 10/24/2016    Last Basic Metabolic Panel:  Lab Results   Component Value Date     10/24/2016      Lab Results   Component Value Date    POTASSIUM 4.7 10/24/2016     Lab Results   Component Value Date    CHLORIDE 107 10/24/2016     Lab Results   Component Value Date    OLAF 9.6 10/24/2016     Lab Results   Component Value Date    CO2 26 10/24/2016     Lab Results   Component Value Date    BUN 16 10/24/2016     Lab Results   Component Value Date    CR 0.99 10/24/2016     Lab Results   Component Value Date     10/24/2016     SUBJECTIVE FINDINGS:  A 66-year-old female returns to clinic for onychauxis and onychomycosis and diabetic foot check.  She relates she is going okay.  She is wearing her diabetic shoes.  She relates she has neuropathy and numbness and tingling in her feet.  No ulcers or sores since I have seen her  last.  No injuries, but her left hallux nail did split and she is concerned about that.  She also relates her compression socks are wearing, and she could use some new ones of those.      OBJECTIVE FINDINGS:  DP and PT are 1/4 bilaterally.  She has peripheral edema bilaterally.  There is no erythema, no odor, no calor, no drainage bilaterally.  Sharp/dull is decreased with 5.07 [place]-Karthik monofilament bilaterally.  Deep tendon reflexes are intact bilaterally.  She has decreased ankle joint dorsiflexion bilaterally.  She has dorsally contracted digits bilaterally.  She hs dystrophic thickened nails with subungual debris, brittleness and discoloration and dystrophy 1-5 bilaterally to differing degrees.  She has a split left hallux nail.        ASSESSMENT AND PLAN: Onychomycosis bilaterally.  Onychauxis bilaterally.  She is diabetic with peripheral neuropathy.  It looks like her left hallux nail caught and tore up a little bit.  It could be from the sock or shoe or even a sheet, hard to say.  Diagnosis and treatment were discussed with her.  All of the nails were debrided or reduced bilaterally upon consent.  Left hallux nail bled a bit upon debridement.  Local wound care done.  Band-Aid and bacitracin and use discussed with her.  Prescription for new compression socks given and use discussed with her with edema and venous stasis.  She will return to clinic to see me in about 3 months.           Again, thank you for allowing me to participate in the care of your patient.      Sincerely,    Vu Grant DPM

## 2017-03-09 NOTE — LETTER
3/9/2017      RE: Cristal Preciado  [facility]  [address]   [place] [address]       Past Medical History   Diagnosis Date     AION (anterior ischaemic optic neuropathy), left eye      NAION LE     CAD (coronary artery disease) 3/2009     [facility]; Angio 2013 [facility]- normal coronary arteries     Cataract      CVA (cerebral vascular accident) (H)      admitted at [facility]      on Cymbalta     Diabetes mellitus, type 2 (H)      Diabetic nephropathy (H)      Diabetic neuropathy (H)      severe     Diabetic retinopathy (H)      GERD (gastroesophageal reflux disease)      Hyperlipidemia      Hypertension      ECHO 2013, TDS, NL EF     POAG (primary open-angle glaucoma)      adv BE     Seasonal allergies      Tubular adenoma of colon 2013     repeat colonoscopy in 2018     Patient Active Problem List   Diagnosis     Cataract     CAD (coronary artery disease)     Diabetes mellitus, type 2 (H)     Diabetic nephropathy (H)     Diabetic neuropathy (H)     Diabetic retinopathy (H)     GERD (gastroesophageal reflux disease)     Glaucoma     Hypertension     Hyperlipidemia     CVA (cerebral vascular accident) (H)     Morbid obesity (H)     Anemia     Seasonal allergies     Depression     Tubular adenoma of colon     Leg weakness     Dermatitis, seborrheic     Eczematous dermatitis     History of coronary artery disease     Venous stasis dermatitis of both lower extremities     Uncontrolled type 2 diabetes mellitus (H)     Past Surgical History   Procedure Laterality Date     Extracapsular cataract extration with intraocular lens implant  11-10-09, 2-9-10     11-10-09 Lt, 2-9-10 Rt; Left eye 8/2012     Colonoscopy  7/15/2013     Tubular adenoma; repeat in 2018;Procedure: COMBINED COLONOSCOPY, SINGLE BIOPSY/POLYPECTOMY BY BIOPSY;;  Surgeon: Don King MD;  Tubular adenoma     Social History     Social History     Marital status: Single     Spouse name: N/A      Number of children: N/A     Years of education: N/A     Occupational History     Not on file.     Social History Main Topics     Smoking status: Former Smoker     Quit date: 3/6/2009     Smokeless tobacco: Never Used     Alcohol use No     Drug use: No     Sexual activity: No     Other Topics Concern     Not on file     Social History Narrative    Pt has three daughters and two sons, single.  Her daughter Court, see's her often at the Nursing Home ([facility]).  Moved into Nursing Home in October 2011 after a hospitalization for ALY.  Moved from [place] in 2002 to [facility]. Has 5 grandchildren.     Family History   Problem Relation Age of Onset     Cancer - colorectal       CEREBROVASCULAR DISEASE       Hypertension Mother      CEREBROVASCULAR DISEASE Mother      Glaucoma Father      CANCER Father      DIABETES Sister      Glaucoma Sister      Lab Results   Component Value Date    A1C 7.5 10/24/2016    Last Basic Metabolic Panel:  Lab Results   Component Value Date     10/24/2016      Lab Results   Component Value Date    POTASSIUM 4.7 10/24/2016     Lab Results   Component Value Date    CHLORIDE 107 10/24/2016     Lab Results   Component Value Date    OLAF 9.6 10/24/2016     Lab Results   Component Value Date    CO2 26 10/24/2016     Lab Results   Component Value Date    BUN 16 10/24/2016     Lab Results   Component Value Date    CR 0.99 10/24/2016     Lab Results   Component Value Date     10/24/2016     SUBJECTIVE FINDINGS:  A 66-year-old female returns to clinic for onychauxis and onychomycosis and diabetic foot check.  She relates she is going okay.  She is wearing her diabetic shoes.  She relates she has neuropathy and numbness and tingling in her feet.  No ulcers or sores since I have seen her last.  No injuries, but her left hallux nail did split and she is concerned about that.  She also relates her compression socks are wearing, and she could use some new ones of those.       OBJECTIVE FINDINGS:  DP and PT are 1/4 bilaterally.  She has peripheral edema bilaterally.  There is no erythema, no odor, no calor, no drainage bilaterally.  Sharp/dull is decreased with 5.07 [place]-Karthik monofilament bilaterally.  Deep tendon reflexes are intact bilaterally.  She has decreased ankle joint dorsiflexion bilaterally.  She has dorsally contracted digits bilaterally.  She hs dystrophic thickened nails with subungual debris, brittleness and discoloration and dystrophy 1-5 bilaterally to differing degrees.  She has a split left hallux nail.        ASSESSMENT AND PLAN: Onychomycosis bilaterally.  Onychauxis bilaterally.  She is diabetic with peripheral neuropathy.  It looks like her left hallux nail caught and tore up a little bit.  It could be from the sock or shoe or even a sheet, hard to say.  Diagnosis and treatment were discussed with her.  All of the nails were debrided or reduced bilaterally upon consent.  Left hallux nail bled a bit upon debridement.  Local wound care done.  Band-Aid and bacitracin and use discussed with her.  Prescription for new compression socks given and use discussed with her with edema and venous stasis.  She will return to clinic to see me in about 3 months.           Vu Grant DPM

## 2017-03-13 ENCOUNTER — TELEPHONE (OUTPATIENT)
Dept: ENDOCRINOLOGY | Facility: CLINIC | Age: 67
End: 2017-03-13

## 2017-03-13 DIAGNOSIS — I87.2 VENOUS STASIS DERMATITIS OF BOTH LOWER EXTREMITIES: ICD-10-CM

## 2017-03-13 NOTE — TELEPHONE ENCOUNTER
Order faxed to [facility]  ----- Message from Vu Grant DPM sent at 3/13/2017  3:10 PM CDT -----  Regarding: RE: order needed  Ok, order is in my last note.  Jamal.  ----- Message -----     From: Fior Dean RN     Sent: 3/13/2017   1:59 PM       To: Vu Grant DPM  Subject: order needed                                        Pt's care facility is requesting a new Rx for her compression stocking. They need the Rx to have the level of compression stated on them (20-30). Please fax a new Rx to [phone].         I will fax order after it has been entered in emr, thanks Kelsy

## 2017-03-13 NOTE — TELEPHONE ENCOUNTER
Pt's care facility is requesting a new Rx for her compression stocking. They need the Rx to have the level of compression stated on them (20-30). Please fax a new Rx to [phone].

## 2017-03-27 ENCOUNTER — OFFICE VISIT - HEALTHEAST (OUTPATIENT)
Dept: GERIATRICS | Facility: CLINIC | Age: 67
End: 2017-03-27

## 2017-03-27 DIAGNOSIS — R60.0 BILATERAL LOWER EXTREMITY EDEMA: ICD-10-CM

## 2017-03-27 DIAGNOSIS — I25.10 CAD (CORONARY ARTERY DISEASE): ICD-10-CM

## 2017-03-27 DIAGNOSIS — I10 ESSENTIAL HYPERTENSION WITH GOAL BLOOD PRESSURE LESS THAN 140/90: ICD-10-CM

## 2017-03-27 DIAGNOSIS — N18.2 CKD (CHRONIC KIDNEY DISEASE), STAGE 2 (MILD): ICD-10-CM

## 2017-03-27 DIAGNOSIS — E78.5 HYPERLIPIDEMIA: ICD-10-CM

## 2017-03-27 DIAGNOSIS — M10.9 GOUT, UNSPECIFIED CAUSE, UNSPECIFIED CHRONICITY, UNSPECIFIED SITE: ICD-10-CM

## 2017-03-27 DIAGNOSIS — E11.9 TYPE 2 DIABETES MELLITUS (H): ICD-10-CM

## 2017-03-27 DIAGNOSIS — E11.42 DIABETIC PERIPHERAL NEUROPATHY ASSOCIATED WITH TYPE 2 DIABETES MELLITUS (H): ICD-10-CM

## 2017-03-27 DIAGNOSIS — F32.A DEPRESSION: ICD-10-CM

## 2017-03-27 ASSESSMENT — MIFFLIN-ST. JEOR: SCORE: 1551.01

## 2017-03-28 ENCOUNTER — RECORDS - HEALTHEAST (OUTPATIENT)
Dept: LAB | Facility: CLINIC | Age: 67
End: 2017-03-28

## 2017-03-29 LAB — HBA1C MFR BLD: 7.3 % (ref 4.2–6.1)

## 2017-04-06 ENCOUNTER — RECORDS - HEALTHEAST (OUTPATIENT)
Dept: LAB | Facility: CLINIC | Age: 67
End: 2017-04-06

## 2017-04-06 LAB
HBV SURFACE AG SERPL QL IA: NEGATIVE
HCV AB SERPL QL IA: NEGATIVE
HEPATITIS B SURFACE ANTIBODY LHE- HISTORICAL: POSITIVE
HIV 1+2 AB+HIV1 P24 AG SERPL QL IA: NEGATIVE

## 2017-04-07 DIAGNOSIS — E11.9 TYPE 2 DIABETES MELLITUS, CONTROLLED (H): ICD-10-CM

## 2017-04-09 RX ORDER — INSULIN LISPRO 200 [IU]/ML
INJECTION, SOLUTION SUBCUTANEOUS
Qty: 75 ML | Refills: 11 | Status: SHIPPED | OUTPATIENT
Start: 2017-04-09 | End: 2018-03-16

## 2017-04-28 ENCOUNTER — OFFICE VISIT - HEALTHEAST (OUTPATIENT)
Dept: GERIATRICS | Facility: CLINIC | Age: 67
End: 2017-04-28

## 2017-04-28 DIAGNOSIS — G62.9 NEUROPATHY: ICD-10-CM

## 2017-04-28 DIAGNOSIS — I10 ESSENTIAL HYPERTENSION: ICD-10-CM

## 2017-04-28 DIAGNOSIS — I25.10 CAD (CORONARY ARTERY DISEASE): ICD-10-CM

## 2017-05-03 ENCOUNTER — AMBULATORY - HEALTHEAST (OUTPATIENT)
Dept: GERIATRICS | Facility: CLINIC | Age: 67
End: 2017-05-03

## 2017-05-05 ENCOUNTER — OFFICE VISIT - HEALTHEAST (OUTPATIENT)
Dept: CARDIOLOGY | Facility: CLINIC | Age: 67
End: 2017-05-05

## 2017-05-05 DIAGNOSIS — I10 ESSENTIAL HYPERTENSION: ICD-10-CM

## 2017-05-05 DIAGNOSIS — E78.5 DYSLIPIDEMIA: ICD-10-CM

## 2017-05-05 DIAGNOSIS — R07.2 PRECORDIAL PAIN: ICD-10-CM

## 2017-05-05 DIAGNOSIS — I25.10 CORONARY ARTERY DISEASE INVOLVING NATIVE CORONARY ARTERY OF NATIVE HEART WITHOUT ANGINA PECTORIS: ICD-10-CM

## 2017-05-05 ASSESSMENT — MIFFLIN-ST. JEOR: SCORE: 1555.55

## 2017-05-22 ENCOUNTER — OFFICE VISIT - HEALTHEAST (OUTPATIENT)
Dept: GERIATRICS | Facility: CLINIC | Age: 67
End: 2017-05-22

## 2017-05-22 DIAGNOSIS — R60.0 BILATERAL LOWER EXTREMITY EDEMA: ICD-10-CM

## 2017-05-22 DIAGNOSIS — E11.9 TYPE 2 DIABETES MELLITUS (H): ICD-10-CM

## 2017-05-22 DIAGNOSIS — E78.5 HYPERLIPIDEMIA: ICD-10-CM

## 2017-05-22 DIAGNOSIS — E66.9 OBESITY: ICD-10-CM

## 2017-05-22 DIAGNOSIS — I25.10 CAD (CORONARY ARTERY DISEASE): ICD-10-CM

## 2017-05-22 DIAGNOSIS — M10.9 GOUT, UNSPECIFIED CAUSE, UNSPECIFIED CHRONICITY, UNSPECIFIED SITE: ICD-10-CM

## 2017-05-22 DIAGNOSIS — E11.42 DIABETIC PERIPHERAL NEUROPATHY ASSOCIATED WITH TYPE 2 DIABETES MELLITUS (H): ICD-10-CM

## 2017-05-22 DIAGNOSIS — F32.A DEPRESSION: ICD-10-CM

## 2017-05-22 DIAGNOSIS — I10 ESSENTIAL HYPERTENSION WITH GOAL BLOOD PRESSURE LESS THAN 140/90: ICD-10-CM

## 2017-05-22 DIAGNOSIS — L20.9 ATOPIC DERMATITIS, UNSPECIFIED TYPE: ICD-10-CM

## 2017-05-22 DIAGNOSIS — N18.2 CKD (CHRONIC KIDNEY DISEASE), STAGE 2 (MILD): ICD-10-CM

## 2017-05-22 ASSESSMENT — MIFFLIN-ST. JEOR: SCORE: 1558.27

## 2017-05-25 ENCOUNTER — TRANSFERRED RECORDS (OUTPATIENT)
Dept: HEALTH INFORMATION MANAGEMENT | Facility: CLINIC | Age: 67
End: 2017-05-25

## 2017-05-25 ENCOUNTER — HOSPITAL ENCOUNTER (OUTPATIENT)
Dept: NUCLEAR MEDICINE | Facility: CLINIC | Age: 67
Discharge: HOME | End: 2017-05-25
Attending: INTERNAL MEDICINE

## 2017-05-25 ENCOUNTER — HOSPITAL ENCOUNTER (OUTPATIENT)
Dept: CARDIOLOGY | Facility: CLINIC | Age: 67
Discharge: HOME | End: 2017-05-25
Attending: INTERNAL MEDICINE

## 2017-05-25 DIAGNOSIS — R07.2 PRECORDIAL PAIN: ICD-10-CM

## 2017-05-25 LAB
CV STRESS CURRENT BP HE: NORMAL
CV STRESS CURRENT HR HE: 64
CV STRESS CURRENT HR HE: 65
CV STRESS CURRENT HR HE: 75
CV STRESS CURRENT HR HE: 76
CV STRESS CURRENT HR HE: 77
CV STRESS CURRENT HR HE: 78
CV STRESS CURRENT HR HE: 80
CV STRESS CURRENT HR HE: 81
CV STRESS CURRENT HR HE: 86
CV STRESS DEVIATION TIME HE: NORMAL
CV STRESS ECHO PERCENT HR HE: NORMAL
CV STRESS EXERCISE STAGE HE: NORMAL
CV STRESS FINAL RESTING BP HE: NORMAL
CV STRESS FINAL RESTING HR HE: 77
CV STRESS MAX HR HE: 87
CV STRESS MAX TREADMILL GRADE HE: 0
CV STRESS MAX TREADMILL SPEED HE: 0
CV STRESS PEAK DIA BP HE: NORMAL
CV STRESS PEAK SYS BP HE: NORMAL
CV STRESS PHASE HE: NORMAL
CV STRESS PROTOCOL HE: NORMAL
CV STRESS RESTING PT POSITION HE: NORMAL
CV STRESS ST DEVIATION AMOUNT HE: NORMAL
CV STRESS ST DEVIATION ELEVATION HE: NORMAL
CV STRESS ST EVELATION AMOUNT HE: NORMAL
CV STRESS TEST TYPE HE: NORMAL
CV STRESS TOTAL STAGE TIME MIN 1 HE: NORMAL
NUC STRESS EJECTION FRACTION: 74 %
STRESS ECHO BASELINE BP: NORMAL
STRESS ECHO BASELINE HR: 64
STRESS ECHO CALCULATED PERCENT HR: 56 %
STRESS ECHO LAST STRESS BP: NORMAL
STRESS ECHO LAST STRESS HR: 81

## 2017-05-26 ENCOUNTER — COMMUNICATION - HEALTHEAST (OUTPATIENT)
Dept: CARDIOLOGY | Facility: CLINIC | Age: 67
End: 2017-05-26

## 2017-06-05 ENCOUNTER — OFFICE VISIT (OUTPATIENT)
Dept: ENDOCRINOLOGY | Facility: CLINIC | Age: 67
End: 2017-06-05

## 2017-06-05 VITALS — HEART RATE: 70 BPM | SYSTOLIC BLOOD PRESSURE: 124 MMHG | DIASTOLIC BLOOD PRESSURE: 51 MMHG

## 2017-06-05 DIAGNOSIS — L30.9 DERMATITIS: Primary | ICD-10-CM

## 2017-06-05 DIAGNOSIS — E11.9 WELL CONTROLLED TYPE 2 DIABETES MELLITUS (H): ICD-10-CM

## 2017-06-05 LAB
HBA1C MFR BLD: 7.3 % (ref 4.3–6)
HBA1C MFR BLD: 7.3 % (ref 4.3–6)

## 2017-06-05 NOTE — LETTER
6/5/2017       RE: Cristal Preciado  [facility]  [address]   [place] [address]     Dear Colleague,    Thank you for referring your patient, Cristal Preciado, to the [facility] ENDOCRINOLOGY at [facility]. Please see a copy of my visit note below.    HPI   Ms. Preciado returns for follow up of type 2 diabetes.  Blood sugars have been running mostly under 200 lately.  Her caretakers at [facility] have been testing her blood sugars 4 times a day.  She is currently taking Lantus 100 units in the morning and 100 units at night and Novolog 75 units at breakfast, 66 units with lunch and 66 with dinner plus sliding scale (2/20 over 180 starting at 4 units).   She is also on liraglutide 1.8 mg daily and is tolerating this fine.  She is taking gabapentin for neuropathy.  She is also on Cymbalta.  Neuropathy in feet better, but hands still bothering her.  She is seeing Dr. Grant for her feet.  She otherwise has been feeling well and in her usual state of health.       She has been having itching of her right ear and scaling.  She has been using eladil cream for the past year, but it is not improving.     ROS   GENERAL: Lost a few pounds.  No fevers, chills,  night sweats.  HEENT: no dysphagia, diplopia, neck pain or tenderness, dry/scratchy eyes, URI, cough, sinus drainage, tinnitus, sinus pressure.   CV: No CP, SOB.  No palpitations, skipped beats, LOC.  LUNGS: no cough, sputum production, wheezing   ABDOMEN: no diarrhea, constipation, abdominal pain  EXTREMITIES:  She has ongoing edema.  Hands and feet feel swollen.  Has thickened toenails, not cutting into skin.  No pain. She sees Dr. Grant.  Dry skin.  Leg ulcerations healed.  Callus on left heel.   NEUROLOGY: no changes in vision.  Continued tingling and numbness in hands and feet.   MSK: weakness in legs after stroke.  Needs help getting out of wheelchair. Does not use walker any more due to  "weakness.      PMH   Type 2 diabetes  Neuropathy   Nephropathy  Stroke - uses a walker, but mainly in wheelchair.   CAD, s/p stent   HTN  Dyslipidemia  Cataracts  GERD    Family Hx:   Mom- stroke  Dad- cancer  1 of 12 children  2 brothers and 2 sisters-  cancer- unsure of type  1 sister with diabetes  5 children  Son- MS  Daughter- MS  6 grandchildren    Social Hx:   Ms. Preciado lives at [facility].  She keeps herself busy with many activities in the day, exercises (\"light and lively\"), crafts, coloring, baking, light bowling. She has many friends in their 90's there and she enjoys their company. She is seeing her family about 1-2 times a month now.     Has 5 children, all now living in [place] [facility] [place] [facility] cities.  6 grandchildren.  4 grandchildren moved south with their mother.  Originally from [place].     Current Medications  Current Outpatient Prescriptions   Medication Sig Dispense Refill     HUMALOG KWIKPEN 200 UNIT/ML soln Inject 75 units at breakfast, 72 units with lunch and 68 with dinner plus sliding scale (2/40 over 180 starting at 4 units).  Approx  230 units daily 75 mL 11     Elastic Bandages & Supports (JOBST KNEE HIGH COMPRESSION SM) MISC JOBST 20-30MMHG COMPRESSION SM MISC   To both legs during waking hours daily for leg swelling and venous stasis dermatitis. Do not sleep in stockings. 2 each 3     olopatadine HCl (PATADAY) 0.2 % SOLN Place 1 drop into both eyes daily as needed 1 Bottle 11     insulin glargine (LANTUS SOLOSTAR) 100 UNIT/ML PEN Take 80 units in the morning and 90 units in the evening. 60 mL 11     insulin pen needle (B-D U/F) 31G X 5 MM Use 5 time(s) per day.  Please dispense as BD Pen Needle Mini U/F 31G x 5  each 11     Pregabalin (LYRICA PO)        pimecrolimus (ELIDEL) 1 % cream Apply topically 2 times daily To areas of rash at ears as needed until clear. 60 g 11     triamcinolone (KENALOG) 0.1 % ointment Apply topically daily To legs under compression " stockings for stasis dermatitis discoloration. 60 g 5     Podiatric Products (AMLACTIN FOOT CREAM THERAPY EX) Externally apply 12 % topically       artificial tears OINT ophthalmic ointment 0.25 inches At Bedtime       calcium-vitamin D 500-125 MG-UNIT TABS Take 1 tablet by mouth 2 times daily       UNABLE TO FIND MEDICATION NAME: milk of magnesia       minocycline (MINOCIN,DYNACIN) 100 MG capsule Take 1 capsule (100 mg) by mouth 2 times daily 28 capsule 5     ketoconazole (NIZORAL) 2 % shampoo To entire wet scalp and ears and then wash off after 5 minutes three times a week. 240 mL 11     clobetasol (TEMOVATE) 0.05 % external solution Apply topically 2 times daily To itchy and scaly areas of scalp as needed. 60 mL 11     ketoconazole (NIZORAL) 2 % cream Apply topically 2 times daily To affected right ear mixed with triamcinolone ointment until return to clinic. 60 g 5     triamcinolone (KENALOG) 0.1 % ointment Apply topically 2 times daily To rash on the right ear mixed with ketoconazole cream until return to clinic. 60 g 5     gabapentin (NEURONTIN) 600 MG tablet Take 600 mg in AM , 600 mg afternoon and 900 mg at HS. 90 tablet 1     DULoxetine (CYMBALTA) 30 MG capsule Take 1 capsule (30 mg) by mouth daily 30 capsule 4     clotrimazole (LOTRIMIN) 1 % cream To toenails twice daily. 60 g 6     dorzolamide-timolol (COSOPT) 2-0.5 % ophthalmic solution Place 1 drop into both eyes 2 times daily       ranitidine (ZANTAC) 150 MG tablet Take 150 mg by mouth 2 times daily       mometasone (ELOCON) 0.1 % cream Apply sparingly to left medial ankle area daily.  Do not apply to face. 45 g 0     carboxymethylcellulose (REFRESH PLUS) 0.5 % SOLN 1 drop 3 times daily as needed.       omeprazole (PRILOSEC) 20 MG capsule Take  by mouth daily. Take one tab daily       sodium chloride (SODIUM CHLORIDE) 0.65 % nasal spray Spray 1 spray in nostril daily as needed.       lisinopril (PRINIVIL,ZESTRIL) 5 MG tablet Take 2 tablets by mouth  daily. Take one tab daily/Hold for SBP < 110 90 tablet 3     latanoprost (XALATAN) 0.005 % ophthalmic solution 1 drop At Bedtime. Left eye       allopurinol (ZYLOPRIM) 100 MG tablet Take 2 tablets by mouth daily. 180 tablet 1     hydrocortisone 1 % cream Apply  topically 2 times daily.       hydrocortisone 2.5 % cream Apply  topically 2 times daily.       liraglutide (VICTOZA) 18 MG/3ML SOLN Inject 1.8 mg Subcutaneous daily. Start with 0.6 mg x 5 days, then increase to 1.2 mg x 5 days, then 1.8 mg thereafter.  Do not advance to higher dose if you have nausea. 3 Month 3     ibuprofen (ADVIL,MOTRIN) 400 MG tablet Take 400 mg by mouth every 6 hours as needed.       chlorthalidone (HYGROTEN) 12.5 MG TABS Take 12.5 mg by mouth daily.       loratadine (CLARITIN) 10 MG tablet Take 10 mg by mouth as needed.       Hypromellose (ARTIFICIAL TEARS OP) Apply  to eye as needed.       Calcium Citrate-Vitamin D (CALCIUM CITRATE + D PO) 600/400 mg/unit take 1 tabs daily twice daily.        nitroGLYCERIN (NITROSTAT) 0.4 MG SL tablet Place 0.4 mg under the tongue every 5 minutes as needed.       acetaminophen (TYLENOL) 500 MG tablet Take 1-2 tablets by mouth every 6 hours as needed. Tylenol Extra Strength.        Magnesium Hydroxide (MILK OF MAGNESIA PO) Take 30 cc as needed PRN constipation.       atorvastatin (LIPITOR) 80 MG tablet Take 1 tablet by mouth daily. Pt needs to have labs done prior to further refills. 90 tablet 0     folic acid (FOLVITE) 1 MG tablet Take 1 mg by mouth daily.       metoprolol (LOPRESSOR) 50 MG tablet Take 50 mg by mouth 2 times daily.       aspirin 325 MG tablet Take 325 mg by mouth daily.       clopidogrel (PLAVIX) 75 MG tablet Take 75 mg by mouth daily.         Physical Exam   /51  Pulse 70  GENERAL: VSS.  Answering questions appropriately, appears stated age. Sitting in wheelchair.   LUNGS: CTA bilaterally. No wheezes, rales, ronchi  EXTREMITIES: Legs with TYREL hose. 1+ edema  bilaterally.  RESULTS  Lab Results   Component Value Date    A1C 7.5 10/24/2016    A1C 7.5 10/12/2015    A1C 8.1 03/06/2014    A1C 7.2 03/07/2013    A1C 7.7 02/13/2013       TSH   Date Value Ref Range Status   10/24/2016 1.20 0.40 - 4.00 mU/L Final   10/12/2015 1.18 0.40 - 4.00 mU/L Final   08/21/2014 1.24 0.40 - 4.00 mU/L Final     Comment:     Effective 7/30/2014, the reference range for this assay has changed to reflect   new instrumentation/methodology.     08/05/2013 1.12 0.4 - 5.0 mU/L Final   07/15/2010 0.53 0.4 - 5.0 mU/L Final     T4 Total   Date Value Ref Range Status   10/30/2008 10.6 5.0 - 11.0 ug/dL Final     T4 Free   Date Value Ref Range Status   04/21/2006 1.04 0.70 - 1.85 ng/dL Final   09/23/2005 1.58 0.70 - 1.85 ng/dL Final       Creatinine   Date Value Ref Range Status   10/24/2016 0.99 0.52 - 1.04 mg/dL Final   10/12/2015 0.88 0.52 - 1.04 mg/dL Final   ]    Albumin   Date Value Ref Range Status   03/06/2014 4.0 3.3 - 4.9 g/dL Final   ]    ALT   Date Value Ref Range Status   10/12/2015 39 0 - 50 U/L Final   03/06/2014 36 0 - 50 U/L Final   ]    Recent Labs   Lab Test  10/24/16   1301  10/12/15   1626  08/21/14   0935   05/10/12   1022   CHOL   --    --   110   --   144   HDL   --    --   43*   --   46*   LDL  48  51  43   < >  73   TRIG   --    --   122   --   123   CHOLHDLRATIO   --    --   2.6   --   3.1    < > = values in this interval not displayed.       Vitamin B12   Date Value Ref Range Status   08/05/2013 506 >210 pg/mL Final     Comment:     Interp: [id number] = Normal   ]    Assessment/Plan:      1.  Type 2 diabetes-  Blood sugars are under good control.  Her a1c is stable at 7.3%.  I think we are at target and do not need very tight control (goal is a1c <8%) based on her risk factors and results of studies including the ACCORD trial.  Asked Good Narayan to call if she starts having hypoglycemia. No other changes today.     2.  Risk factors- BP good today.  No microalbuminuria.   On ACE  inhibitor.  Creatinine ok.  LDL <100 on statin.  Follows with podiatry.  On gabapentin and cymbalta for neuropathy.  Will schedule with Dr. Grant.     3.  Dermatitis- right ear.  Very itchy and irritated for more than a year, worsening.  Not responding to eladil cream or ketoconazole shampoo.  Will refer to dermatology.     4.  F/U in 3 months with me, sooner if she has any concerns.      >25/30 minutes were spent counseling patient    Renetta Washington PA-C, MPAS   [facility]  Department of Medicine  Division of Endocrinology and Diabetes

## 2017-06-05 NOTE — MR AVS SNAPSHOT
After Visit Summary   6/5/2017    Cristal Preciado    MRN: [id number]           Patient Information     Date Of Birth          1950        Visit Information        Provider Department      6/5/2017 10:00 AM Renetta Washington PA-C M [facility] Endocrinology        Today's Diagnoses     Dermatitis    -  1    Diabetes mellitus, type 2 (H)           Follow-ups after your visit        Additional Services     DERMATOLOGY REFERRAL       Evaluate for Dry, scaly rash on right ear- not responding to eladil cream                  Your next 10 appointments already scheduled     Jun 08, 2017  1:00 PM CDT   (Arrive by 12:45 PM)   Return Visit with Vu Grant DPM   [facility] Endocr[facility]ology ([facility])    [address]  [address] [facility] [address] [phone]            Aug 02, 2017 10:45 AM CDT   (Arrive by 10:30 AM)   Return Visit with Charleen Shultz MD   [facility] Dermat[facility] ([facility])    [address]  [address] [facility] [address] [phone]            Aug 02, 2017 11:40 AM CDT   (Arrive by 11:25 AM)   RETURN GENERAL with José Miguel Cheng OD   [facility] Ophthalmology ([facility])    [address]  [address] [facility] [address] [phone]              Who to contact     Please call your clinic at [phone] to:    Ask questions about your health    Make or cancel appointments    Discuss your medicines    Learn about your test results    Speak to your doctor   If you have compliments or concerns about an experience at your clinic, or if you wish to file a complaint, please contact [facility] Patient Relations at [phone] or email us at [email or web address]         Additional Information About Your Visit        MyChart Information     [employer] is an electronic gateway that provides easy, online access to your medical records. With [employer], you  can request a clinic appointment, read your test results, renew a prescription or communicate with your care team.     To sign up for [employer] visit the website at [email or web address]   You will be asked to enter the access code listed below, as well as some personal information. Please follow the directions to create your username and password.     Your access code is: [email or web address]  Expires: 2017  6:30 AM     Your access code will  in 90 days. If you need help or a new code, please contact your [facility] or call [phone] for assistance.        Care EveryWhere ID     This is your Care EveryWhere ID. This could be used by other organizations to access your [facility] medical records  [id number]        Your Vitals Were     Pulse                   70            Blood Pressure from Last 3 Encounters:   17 124/51   17 138/84   10/24/16 119/77    Weight from Last 3 Encounters:   16 106.5 kg (234 lb 12.8 oz)   03/02/15 105.7 kg (233 lb)   14 111.6 kg (246 lb)              We Performed the Following     DERMATOLOGY REFERRAL     Hemoglobin A1c POCT        Primary Care Provider Office Phone # Fax #    Meagan CORNELIUS José Miguel [phone]       [facility] AFTER HOURS [address] [place] [address]        Thank you!     Thank you for choosing [facility]  for your care. Our goal is always to provide you with excellent care. Hearing back from our patients is one way we can continue to improve our services. Please take a few minutes to complete the written survey that you may receive in the mail after your visit with us. Thank you!             Your Updated Medication List - Protect others around you: Learn how to safely use, store and throw away your medicines at www.disposemymeds.org.          This list is accurate as of: 17 11:13 AM.  Always use your most recent med list.                   Brand Name Dispense  Instructions for use    acetaminophen 500 MG tablet    TYLENOL     Take 1-2 tablets by mouth every 6 hours as needed. Tylenol Extra Strength.       allopurinol 100 MG tablet    ZYLOPRIM    180 tablet    Take 2 tablets by mouth daily.       AMLACTIN FOOT CREAM THERAPY EX      Externally apply 12 % topically       artificial tears Oint ophthalmic ointment      0.25 inches At Bedtime       ARTIFICIAL TEARS OP      Apply  to eye as needed.       aspirin 325 MG tablet      Take 325 mg by mouth daily.       atorvastatin 80 MG tablet    LIPITOR    90 tablet    Take 1 tablet by mouth daily. Pt needs to have labs done prior to further refills.       CALCIUM CITRATE + D PO      600/400 mg/unit take 1 tabs daily twice daily.       calcium-vitamin D 500-125 MG-UNIT Tabs      Take 1 tablet by mouth 2 times daily       carboxymethylcellulose 0.5 % Soln ophthalmic solution    REFRESH PLUS     1 drop 3 times daily as needed.       chlorthalidone 12.5 mg Tabs half-tab    HYGROTON     Take 12.5 mg by mouth daily.       CLARITIN 10 MG tablet   Generic drug:  loratadine      Take 10 mg by mouth as needed.       clobetasol 0.05 % external solution    TEMOVATE    60 mL    Apply topically 2 times daily To itchy and scaly areas of scalp as needed.       clotrimazole 1 % cream    LOTRIMIN    60 g    To toenails twice daily.       dorzolamide-timolol 2-0.5 % ophthalmic solution    COSOPT     Place 1 drop into both eyes 2 times daily       DULoxetine 30 MG EC capsule    CYMBALTA    30 capsule    Take 1 capsule (30 mg) by mouth daily       folic acid 1 MG tablet    FOLVITE     Take 1 mg by mouth daily.       gabapentin 600 MG tablet    NEURONTIN    90 tablet    Take 600 mg in AM , 600 mg afternoon and 900 mg at HS.       HUMALOG KWIKPEN 200 UNIT/ML soln   Generic drug:  Insulin Lispro     75 mL    Inject 75 units at breakfast, 72 units with lunch and 68 with dinner plus sliding scale (2/40 over 180 starting at 4 units).  Approx  230 units  daily       * hydrocortisone 1 % cream    CORTAID     Apply  topically 2 times daily.       * hydrocortisone 2.5 % cream      Apply  topically 2 times daily.       ibuprofen 400 MG tablet    ADVIL/MOTRIN     Take 400 mg by mouth every 6 hours as needed.       insulin glargine 100 UNIT/ML injection    LANTUS SOLOSTAR    60 mL    Take 80 units in the morning and 90 units in the evening.       insulin pen needle 31G X 5 MM    B-D U/F    150 each    Use 5 time(s) per day.  Please dispense as BD Pen Needle Mini U/F 31G x 5 MM       JOBST KNEE HIGH COMPRESSION SM Misc     2 each    JOBST 20-30MMHG COMPRESSION SM MISC  To both legs during waking hours daily for leg swelling and venous stasis dermatitis. Do not sleep in stockings.       * ketoconazole 2 % shampoo    NIZORAL    240 mL    To entire wet scalp and ears and then wash off after 5 minutes three times a week.       * ketoconazole 2 % cream    NIZORAL    60 g    Apply topically 2 times daily To affected right ear mixed with triamcinolone ointment until return to clinic.       liraglutide 18 MG/3ML Soln    VICTOZA    3 Month    Inject 1.8 mg Subcutaneous daily. Start with 0.6 mg x 5 days, then increase to 1.2 mg x 5 days, then 1.8 mg thereafter.  Do not advance to higher dose if you have nausea.       lisinopril 5 MG tablet    PRINIVIL/ZESTRIL    90 tablet    Take 2 tablets by mouth daily. Take one tab daily/Hold for SBP < 110       LYRICA PO          metoprolol 50 MG tablet    LOPRESSOR     Take 50 mg by mouth 2 times daily.       MILK OF MAGNESIA PO      Take 30 cc as needed PRN constipation.       minocycline 100 MG capsule    MINOCIN/DYNACIN    28 capsule    Take 1 capsule (100 mg) by mouth 2 times daily       mometasone 0.1 % cream    ELOCON    45 g    Apply sparingly to left medial ankle area daily.  Do not apply to face.       nitroglycerin 0.4 MG sublingual tablet    NITROSTAT     Place 0.4 mg under the tongue every 5 minutes as needed.       olopatadine HCl  0.2 % Soln    PATADAY    1 Bottle    Place 1 drop into both eyes daily as needed       omeprazole 20 MG CR capsule    priLOSEC     Take  by mouth daily. Take one tab daily       pimecrolimus 1 % cream    ELIDEL    60 g    Apply topically 2 times daily To areas of rash at ears as needed until clear.       PLAVIX 75 MG tablet   Generic drug:  clopidogrel      Take 75 mg by mouth daily.       ranitidine 150 MG tablet    ZANTAC     Take 150 mg by mouth 2 times daily       sodium chloride 0.65 % nasal spray    OCEAN     Spray 1 spray in nostril daily as needed.       * triamcinolone 0.1 % ointment    KENALOG    60 g    Apply topically 2 times daily To rash on the right ear mixed with ketoconazole cream until return to clinic.       * triamcinolone 0.1 % ointment    KENALOG    60 g    Apply topically daily To legs under compression stockings for stasis dermatitis discoloration.       UNABLE TO FIND      MEDICATION NAME: milk of magnesia       XALATAN 0.005 % ophthalmic solution   Generic drug:  latanoprost      1 drop At Bedtime. Left eye       * Notice:  This list has 6 medication(s) that are the same as other medications prescribed for you. Read the directions carefully, and ask your doctor or other care provider to review them with you.

## 2017-06-05 NOTE — PROGRESS NOTES
HPI   Ms. Preciado returns for follow up of type 2 diabetes.  Blood sugars have been running mostly under 200 lately.  Her caretakers at [facility] have been testing her blood sugars 4 times a day.  She is currently taking Lantus 100 units in the morning and 100 units at night and Novolog 75 units at breakfast, 66 units with lunch and 66 with dinner plus sliding scale (2/20 over 180 starting at 4 units).   She is also on liraglutide 1.8 mg daily and is tolerating this fine.  She is taking gabapentin for neuropathy.  She is also on Cymbalta.  Neuropathy in feet better, but hands still bothering her.  She is seeing Dr. Grant for her feet.  She otherwise has been feeling well and in her usual state of health.       She has been having itching of her right ear and scaling.  She has been using eladil cream for the past year, but it is not improving.     ROS   GENERAL: Lost a few pounds.  No fevers, chills,  night sweats.  HEENT: no dysphagia, diplopia, neck pain or tenderness, dry/scratchy eyes, URI, cough, sinus drainage, tinnitus, sinus pressure.   CV: No CP, SOB.  No palpitations, skipped beats, LOC.  LUNGS: no cough, sputum production, wheezing   ABDOMEN: no diarrhea, constipation, abdominal pain  EXTREMITIES:  She has ongoing edema.  Hands and feet feel swollen.  Has thickened toenails, not cutting into skin.  No pain. She sees Dr. Grant.  Dry skin.  Leg ulcerations healed.  Callus on left heel.   NEUROLOGY: no changes in vision.  Continued tingling and numbness in hands and feet.   MSK: weakness in legs after stroke.  Needs help getting out of wheelchair. Does not use walker any more due to weakness.      PMH   Type 2 diabetes  Neuropathy   Nephropathy  Stroke - uses a walker, but mainly in wheelchair.   CAD, s/p stent 2009  HTN  Dyslipidemia  Cataracts  GERD    Family Hx:   Mom- stroke  Dad- cancer  1 of 12 children  2 brothers and 2 sisters-  cancer- unsure of type  1 sister with diabetes  5  "children  Son- MS  Daughter- MS  6 grandchildren    Social Hx:   Ms. Preciado lives at [facility].  She keeps herself busy with many activities in the day, exercises (\"light and lively\"), crafts, coloring, baking, light bowling. She has many friends in their 90's there and she enjoys their company. She is seeing her family about 1-2 times a month now.     Has 5 children, all now living in [facility] cities.  6 grandchildren.  4 grandchildren moved south with their mother.  Originally from [facility].     Current Medications  Current Outpatient Prescriptions   Medication Sig Dispense Refill     HUMALOG KWIKPEN 200 UNIT/ML soln Inject 75 units at breakfast, 72 units with lunch and 68 with dinner plus sliding scale (2/40 over 180 starting at 4 units).  Approx  230 units daily 75 mL 11     Elastic Bandages & Supports (JOBST KNEE HIGH COMPRESSION SM) MISC JOBST 20-30MMHG COMPRESSION SM MISC   To both legs during waking hours daily for leg swelling and venous stasis dermatitis. Do not sleep in stockings. 2 each 3     olopatadine HCl (PATADAY) 0.2 % SOLN Place 1 drop into both eyes daily as needed 1 Bottle 11     insulin glargine (LANTUS SOLOSTAR) 100 UNIT/ML PEN Take 80 units in the morning and 90 units in the evening. 60 mL 11     insulin pen needle (B-D U/F) 31G X 5 MM Use 5 time(s) per day.  Please dispense as BD Pen Needle Mini U/F 31G x 5  each 11     Pregabalin (LYRICA PO)        pimecrolimus (ELIDEL) 1 % cream Apply topically 2 times daily To areas of rash at ears as needed until clear. 60 g 11     triamcinolone (KENALOG) 0.1 % ointment Apply topically daily To legs under compression stockings for stasis dermatitis discoloration. 60 g 5     Podiatric Products (AMLACTIN FOOT CREAM THERAPY EX) Externally apply 12 % topically       artificial tears OINT ophthalmic ointment 0.25 inches At Bedtime       calcium-vitamin D 500-125 MG-UNIT TABS Take 1 tablet by mouth 2 times daily       UNABLE TO FIND " MEDICATION NAME: milk of magnesia       minocycline (MINOCIN,DYNACIN) 100 MG capsule Take 1 capsule (100 mg) by mouth 2 times daily 28 capsule 5     ketoconazole (NIZORAL) 2 % shampoo To entire wet scalp and ears and then wash off after 5 minutes three times a week. 240 mL 11     clobetasol (TEMOVATE) 0.05 % external solution Apply topically 2 times daily To itchy and scaly areas of scalp as needed. 60 mL 11     ketoconazole (NIZORAL) 2 % cream Apply topically 2 times daily To affected right ear mixed with triamcinolone ointment until return to clinic. 60 g 5     triamcinolone (KENALOG) 0.1 % ointment Apply topically 2 times daily To rash on the right ear mixed with ketoconazole cream until return to clinic. 60 g 5     gabapentin (NEURONTIN) 600 MG tablet Take 600 mg in AM , 600 mg afternoon and 900 mg at HS. 90 tablet 1     DULoxetine (CYMBALTA) 30 MG capsule Take 1 capsule (30 mg) by mouth daily 30 capsule 4     clotrimazole (LOTRIMIN) 1 % cream To toenails twice daily. 60 g 6     dorzolamide-timolol (COSOPT) 2-0.5 % ophthalmic solution Place 1 drop into both eyes 2 times daily       ranitidine (ZANTAC) 150 MG tablet Take 150 mg by mouth 2 times daily       mometasone (ELOCON) 0.1 % cream Apply sparingly to left medial ankle area daily.  Do not apply to face. 45 g 0     carboxymethylcellulose (REFRESH PLUS) 0.5 % SOLN 1 drop 3 times daily as needed.       omeprazole (PRILOSEC) 20 MG capsule Take  by mouth daily. Take one tab daily       sodium chloride (SODIUM CHLORIDE) 0.65 % nasal spray Spray 1 spray in nostril daily as needed.       lisinopril (PRINIVIL,ZESTRIL) 5 MG tablet Take 2 tablets by mouth daily. Take one tab daily/Hold for SBP < 110 90 tablet 3     latanoprost (XALATAN) 0.005 % ophthalmic solution 1 drop At Bedtime. Left eye       allopurinol (ZYLOPRIM) 100 MG tablet Take 2 tablets by mouth daily. 180 tablet 1     hydrocortisone 1 % cream Apply  topically 2 times daily.       hydrocortisone 2.5 % cream  Apply  topically 2 times daily.       liraglutide (VICTOZA) 18 MG/3ML SOLN Inject 1.8 mg Subcutaneous daily. Start with 0.6 mg x 5 days, then increase to 1.2 mg x 5 days, then 1.8 mg thereafter.  Do not advance to higher dose if you have nausea. 3 Month 3     ibuprofen (ADVIL,MOTRIN) 400 MG tablet Take 400 mg by mouth every 6 hours as needed.       chlorthalidone (HYGROTEN) 12.5 MG TABS Take 12.5 mg by mouth daily.       loratadine (CLARITIN) 10 MG tablet Take 10 mg by mouth as needed.       Hypromellose (ARTIFICIAL TEARS OP) Apply  to eye as needed.       Calcium Citrate-Vitamin D (CALCIUM CITRATE + D PO) 600/400 mg/unit take 1 tabs daily twice daily.        nitroGLYCERIN (NITROSTAT) 0.4 MG SL tablet Place 0.4 mg under the tongue every 5 minutes as needed.       acetaminophen (TYLENOL) 500 MG tablet Take 1-2 tablets by mouth every 6 hours as needed. Tylenol Extra Strength.        Magnesium Hydroxide (MILK OF MAGNESIA PO) Take 30 cc as needed PRN constipation.       atorvastatin (LIPITOR) 80 MG tablet Take 1 tablet by mouth daily. Pt needs to have labs done prior to further refills. 90 tablet 0     folic acid (FOLVITE) 1 MG tablet Take 1 mg by mouth daily.       metoprolol (LOPRESSOR) 50 MG tablet Take 50 mg by mouth 2 times daily.       aspirin 325 MG tablet Take 325 mg by mouth daily.       clopidogrel (PLAVIX) 75 MG tablet Take 75 mg by mouth daily.         Physical Exam   /51  Pulse 70  GENERAL: VSS.  Answering questions appropriately, appears stated age. Sitting in wheelchair.   LUNGS: CTA bilaterally. No wheezes, rales, ronchi  EXTREMITIES: Legs with TYREL hose. 1+ edema bilaterally.  RESULTS  Lab Results   Component Value Date    A1C 7.5 10/24/2016    A1C 7.5 10/12/2015    A1C 8.1 03/06/2014    A1C 7.2 03/07/2013    A1C 7.7 02/13/2013       TSH   Date Value Ref Range Status   10/24/2016 1.20 0.40 - 4.00 mU/L Final   10/12/2015 1.18 0.40 - 4.00 mU/L Final   08/21/2014 1.24 0.40 - 4.00 mU/L Final      Comment:     Effective 7/30/2014, the reference range for this assay has changed to reflect   new instrumentation/methodology.     08/05/2013 1.12 0.4 - 5.0 mU/L Final   07/15/2010 0.53 0.4 - 5.0 mU/L Final     T4 Total   Date Value Ref Range Status   10/30/2008 10.6 5.0 - 11.0 ug/dL Final     T4 Free   Date Value Ref Range Status   04/21/2006 1.04 0.70 - 1.85 ng/dL Final   09/23/2005 1.58 0.70 - 1.85 ng/dL Final       Creatinine   Date Value Ref Range Status   10/24/2016 0.99 0.52 - 1.04 mg/dL Final   10/12/2015 0.88 0.52 - 1.04 mg/dL Final   ]    Albumin   Date Value Ref Range Status   03/06/2014 4.0 3.3 - 4.9 g/dL Final   ]    ALT   Date Value Ref Range Status   10/12/2015 39 0 - 50 U/L Final   03/06/2014 36 0 - 50 U/L Final   ]    Recent Labs   Lab Test  10/24/16   1301  10/12/15   1626  08/21/14   0935   05/10/12   1022   CHOL   --    --   110   --   144   HDL   --    --   43*   --   46*   LDL  48  51  43   < >  73   TRIG   --    --   122   --   123   CHOLHDLRATIO   --    --   2.6   --   3.1    < > = values in this interval not displayed.       Vitamin B12   Date Value Ref Range Status   08/05/2013 506 >210 pg/mL Final     Comment:     Interp: [id number] = Normal   ]    Assessment/Plan:      1.  Type 2 diabetes-  Blood sugars are under good control.  Her a1c is stable at 7.3%.  I think we are at target and do not need very tight control (goal is a1c <8%) based on her risk factors and results of studies including the ACCORD trial.  Asked Jake Busch to call if she starts having hypoglycemia. No other changes today.     2.  Risk factors- BP good today.  No microalbuminuria.   On ACE inhibitor.  Creatinine ok.  LDL <100 on statin.  Follows with podiatry.  On gabapentin and cymbalta for neuropathy.  Will schedule with Dr. Grant.     3.  Dermatitis- right ear.  Very itchy and irritated for more than a year, worsening.  Not responding to eladil cream or ketoconazole shampoo.  Will refer to dermatology.     4.  F/U in 3  months with me, sooner if she has any concerns.      >25/30 minutes were spent counseling patient    Renetta Washington PA-C, MPAS   [facility]  Department of Medicine  Division of Endocrinology and Diabetes

## 2017-06-08 ENCOUNTER — OFFICE VISIT (OUTPATIENT)
Dept: ENDOCRINOLOGY | Facility: CLINIC | Age: 67
End: 2017-06-08

## 2017-06-08 DIAGNOSIS — B35.1 DERMATOPHYTOSIS OF NAIL: Primary | ICD-10-CM

## 2017-06-08 DIAGNOSIS — E11.40 DIABETIC NEUROPATHY WITH NEUROLOGIC COMPLICATION (H): ICD-10-CM

## 2017-06-08 DIAGNOSIS — E11.49 DIABETIC NEUROPATHY WITH NEUROLOGIC COMPLICATION (H): ICD-10-CM

## 2017-06-08 DIAGNOSIS — L60.2 ONYCHAUXIS: ICD-10-CM

## 2017-06-08 NOTE — LETTER
6/8/2017       RE: Cristal Preciado  [facility]  [address]   [place] [address]     Dear Colleague,    Thank you for referring your patient, Cristal Preciado, to the [facility] ENDOCRINOLOGY at [facility]. Please see a copy of my visit note below.    Past Medical History:   Diagnosis Date     AION (anterior ischaemic optic neuropathy), left eye     NAION LE     CAD (coronary artery disease) 3/2009    [facility]; Angio 2013 [facility]- normal coronary arteries     Cataract      CVA (cerebral vascular accident) (H)     admitted at [facility]     Depression     on Cymbalta     Diabetes mellitus, type 2 (H)      Diabetic nephropathy (H)      Diabetic neuropathy (H)     severe     Diabetic retinopathy (H)      GERD (gastroesophageal reflux disease)      Hyperlipidemia      Hypertension     ECHO 2013, TDS, NL EF     POAG (primary open-angle glaucoma)     adv BE     Seasonal allergies      Tubular adenoma of colon 2013    repeat colonoscopy in 2018     Patient Active Problem List   Diagnosis     Cataract     CAD (coronary artery disease)     Diabetes mellitus, type 2 (H)     Diabetic nephropathy (H)     Diabetic neuropathy (H)     Diabetic retinopathy (H)     GERD (gastroesophageal reflux disease)     Glaucoma     Hypertension     Hyperlipidemia     CVA (cerebral vascular accident) (H)     Morbid obesity (H)     Anemia     Seasonal allergies     Depression     Tubular adenoma of colon     Leg weakness     Dermatitis, seborrheic     Eczematous dermatitis     History of coronary artery disease     Venous stasis dermatitis of both lower extremities     Uncontrolled type 2 diabetes mellitus (H)     Past Surgical History:   Procedure Laterality Date     COLONOSCOPY  7/15/2013    Tubular adenoma; repeat in 2018;Procedure: COMBINED COLONOSCOPY, SINGLE BIOPSY/POLYPECTOMY BY BIOPSY;;  Surgeon: Don King MD;  Tubular adenoma     EXTRACAPSULAR CATARACT  EXTRATION WITH INTRAOCULAR LENS IMPLANT  11-10-09, 2-9-10    11-10-09 Lt, 2-9-10 Rt; Left eye 8/2012     Social History     Social History     Marital status: Single     Spouse name: N/A     Number of children: N/A     Years of education: N/A     Occupational History     Not on file.     Social History Main Topics     Smoking status: Former Smoker     Quit date: 3/6/2009     Smokeless tobacco: Never Used     Alcohol use No     Drug use: No     Sexual activity: No     Other Topics Concern     Not on file     Social History Narrative    Pt has three daughters and two sons, single.  Her daughter Court, see's her often at the Nursing Home ([facility]).  Moved into Nursing Home in October 2011 after a hospitalization for ALY.  Moved from [place] in 2002 to [facility]. Has 5 grandchildren.     Family History   Problem Relation Age of Onset     Cancer - colorectal       CEREBROVASCULAR DISEASE       Hypertension Mother      CEREBROVASCULAR DISEASE Mother      Glaucoma Father      CANCER Father      DIABETES Sister      Glaucoma Sister      Lab Results   Component Value Date    A1C 7.5 10/24/2016      SUBJECTIVE:  A 67-year-old female returns to clinic for onychomycosis and onychauxis bilaterally.  She is diabetic with peripheral neuropathy.  She presents in her wheelchair.  She relates she has numbness and tingling in her toes.  No ulcers or sores since we have seen her last.  She did not get diabetic shoes.        OBJECTIVE:  DP and PT 1/4 bilaterally.  She has some mild peripheral edema bilaterally.  There is no erythema, no drainage, no odor, no calor bilaterally.  She has decreased hair growth bilaterally.  She has generalized decreased range of motion bilaterally.  She has incurvated nails with some thickening, dystrophy, subungual debris and brittleness 1-5 bilaterally.        ASSESSMENT/PLAN:  Onychomycosis bilaterally, onychauxis bilaterally.  She is diabetic with peripheral neuropathy.  Diagnosis and  treatment options discussed with her.  I advised her on stretching exercises.  She can get the diabetic shoes.  All the nails were debrided or reduced bilaterally upon consent.  The right hallux nail bled a bit upon debridement.  Local wound care done today upon consent and use discussed with her.  She will return to clinic and see me in about 3 months.         Again, thank you for allowing me to participate in the care of your patient.      Sincerely,    Vu Grant DPM

## 2017-06-08 NOTE — PROGRESS NOTES
Past Medical History:   Diagnosis Date     AION (anterior ischaemic optic neuropathy), left eye     NAION LE     CAD (coronary artery disease) 3/2009    [facility]; Angio 2013 [facility]- normal coronary arteries     Cataract      CVA (cerebral vascular accident) (H)     admitted at [facility]     on Cymbalta     Diabetes mellitus, type 2 (H)      Diabetic nephropathy (H)      Diabetic neuropathy (H)     severe     Diabetic retinopathy (H)      GERD (gastroesophageal reflux disease)      Hyperlipidemia      Hypertension     ECHO 2013, TDS, NL EF     POAG (primary open-angle glaucoma)     adv BE     Seasonal allergies      Tubular adenoma of colon 2013    repeat colonoscopy in 2018     Patient Active Problem List   Diagnosis     Cataract     CAD (coronary artery disease)     Diabetes mellitus, type 2 (H)     Diabetic nephropathy (H)     Diabetic neuropathy (H)     Diabetic retinopathy (H)     GERD (gastroesophageal reflux disease)     Glaucoma     Hypertension     Hyperlipidemia     CVA (cerebral vascular accident) (H)     Morbid obesity (H)     Anemia     Seasonal allergies     Depression     Tubular adenoma of colon     Leg weakness     Dermatitis, seborrheic     Eczematous dermatitis     History of coronary artery disease     Venous stasis dermatitis of both lower extremities     Uncontrolled type 2 diabetes mellitus (H)     Past Surgical History:   Procedure Laterality Date     COLONOSCOPY  7/15/2013    Tubular adenoma; repeat in 2018;Procedure: COMBINED COLONOSCOPY, SINGLE BIOPSY/POLYPECTOMY BY BIOPSY;;  Surgeon: Don King MD;  Tubular adenoma     EXTRACAPSULAR CATARACT EXTRATION WITH INTRAOCULAR LENS IMPLANT  11-10-09, 2-9-10    11-10-09 Lt, 2-9-10 Rt; Left eye 8/2012     Social History     Social History     Marital status: Single     Spouse name: N/A     Number of children: N/A     Years of education: N/A     Occupational History     Not on file.     Social History Main Topics      Smoking status: Former Smoker     Quit date: 3/6/2009     Smokeless tobacco: Never Used     Alcohol use No     Drug use: No     Sexual activity: No     Other Topics Concern     Not on file     Social History Narrative    Pt has three daughters and two sons, single.  Her daughter Court, see's her often at the [facility] ([facility]  Moved into Nursing Home in October 2011 after a hospitalization for ALY.  Moved from [place] in 2002 to [facility]. Has 5 grandchildren.     Family History   Problem Relation Age of Onset     Cancer - colorectal       CEREBROVASCULAR DISEASE       Hypertension Mother      CEREBROVASCULAR DISEASE Mother      Glaucoma Father      CANCER Father      DIABETES Sister      Glaucoma Sister      Lab Results   Component Value Date    A1C 7.5 10/24/2016      SUBJECTIVE:  A 67-year-old female returns to clinic for onychomycosis and onychauxis bilaterally.  She is diabetic with peripheral neuropathy.  She presents in her wheelchair.  She relates she has numbness and tingling in her toes.  No ulcers or sores since we have seen her last.  She did not get diabetic shoes.        OBJECTIVE:  DP and PT 1/4 bilaterally.  She has some mild peripheral edema bilaterally.  There is no erythema, no drainage, no odor, no calor bilaterally.  She has decreased hair growth bilaterally.  She has generalized decreased range of motion bilaterally.  She has incurvated nails with some thickening, dystrophy, subungual debris and brittleness 1-5 bilaterally.        ASSESSMENT/PLAN:  Onychomycosis bilaterally, onychauxis bilaterally.  She is diabetic with peripheral neuropathy.  Diagnosis and treatment options discussed with her.  I advised her on stretching exercises.  She can get the diabetic shoes.  All the nails were debrided or reduced bilaterally upon consent.  The right hallux nail bled a bit upon debridement.  Local wound care done today upon consent and use discussed with her.  She will return to  clinic and see me in about 3 months.

## 2017-06-08 NOTE — MR AVS SNAPSHOT
After Visit Summary   6/8/2017    Cristal Preciado    MRN: [id number]           Patient Information     Date Of Birth          1950        Visit Information        Provider Department      6/8/2017 1:00 PM Vu Grant DPM [facility]         Follow-ups after your visit        Your next 10 appointments already scheduled     Jun 08, 2017  1:00 PM CDT   (Arrive by 12:45 PM)   Return [facility] with [facility]A [facility] ([facility])    [address] [place] [id number] [phone]            Aug 02, 2017 10:45 AM CDT   (Arrive by 10:30 AM)   Return Visit with MD ADRYAN [facility] Dermatology ([facility])    [address] [place] [phone]            Aug 02, 2017 11:40 AM CDT   (Arrive by 11:25 AM)   RETURN GENERAL with HENRRY Morgan [facility] Ophthalmology ([facility])    [address] [place] [id number] [phone]            Sep 28, 2017  1:00 PM CDT   (Arrive by 12:45 PM)   Return Visit with MIRTHA [facility] Endocrinology ([facility])    [address] [place] [address] [phone] [phone]              Who to contact     Please call your clinic at [phone] to:    Ask questions about your health    Make or cancel appointments    Discuss your medicines    Learn about your test results    Speak to your doctor   If you have compliments or concerns about an experience at your clinic, or if you wish to file a complaint, please contact [facility] Patient Relations at [phone] or email us at [email or web address]         Additional Information About Your Visit        MyChart Information     [employer] is an electronic gateway that provides easy, online access to your medical records.  With [facility], you can request a clinic appointment, read your test results, renew a prescription or communicate with your care team.     To sign up for [facility] visit the website at [email or web address]   You will be asked to enter the access code listed below, as well as some personal information. Please follow the directions to create your username and password.     Your access code is: [email or web address]  Expires: 2017  6:30 AM     Your access code will  in 90 days. If you need help or a new code, please contact your [facility] or call [phone] for assistance.        Care EveryWhere ID     This is your Care EveryWhere ID. This could be used by other organizations to access your [place] medical records  [id number]         Blood Pressure from Last 3 Encounters:   17 124/51   17 138/84   10/24/16 119/77    Weight from Last 3 Encounters:   16 106.5 kg (234 lb 12.8 oz)   03/02/15 105.7 kg (233 lb)   14 111.6 kg (246 lb)              Today, you had the following     No orders found for display       Primary Care Provider Office Phone # Fax #    Meagan CORNELIUS José Miguel [phone]       [facility] AFTER HOURS [address] [place] [address]        Thank you!     Thank you for choosing [facility]  for your care. Our goal is always to provide you with excellent care. Hearing back from our patients is one way we can continue to improve our services. Please take a few minutes to complete the written survey that you may receive in the mail after your visit with us. Thank you!             Your Updated Medication List - Protect others around you: Learn how to safely use, store and throw away your medicines at www.disposemymeds.org.          This list is accurate as of: 17 12:57 PM.  Always use your most recent med list.                   Brand Name Dispense Instructions for use    acetaminophen 500 MG tablet    TYLENOL      Take 1-2 tablets by mouth every 6 hours as needed. Tylenol Extra Strength.       allopurinol 100 MG tablet    ZYLOPRIM    180 tablet    Take 2 tablets by mouth daily.       AMLACTIN FOOT CREAM THERAPY EX      Externally apply 12 % topically       artificial tears Oint ophthalmic ointment      0.25 inches At Bedtime       ARTIFICIAL TEARS OP      Apply  to eye as needed.       aspirin 325 MG tablet      Take 325 mg by mouth daily.       atorvastatin 80 MG tablet    LIPITOR    90 tablet    Take 1 tablet by mouth daily. Pt needs to have labs done prior to further refills.       CALCIUM CITRATE + D PO      600/400 mg/unit take 1 tabs daily twice daily.       calcium-vitamin D 500-125 MG-UNIT Tabs      Take 1 tablet by mouth 2 times daily       carboxymethylcellulose 0.5 % Soln ophthalmic solution    REFRESH PLUS     1 drop 3 times daily as needed.       chlorthalidone 12.5 mg Tabs half-tab    HYGROTON     Take 12.5 mg by mouth daily.       CLARITIN 10 MG tablet   Generic drug:  loratadine      Take 10 mg by mouth as needed.       clobetasol 0.05 % external solution    TEMOVATE    60 mL    Apply topically 2 times daily To itchy and scaly areas of scalp as needed.       clotrimazole 1 % cream    LOTRIMIN    60 g    To toenails twice daily.       dorzolamide-timolol 2-0.5 % ophthalmic solution    COSOPT     Place 1 drop into both eyes 2 times daily       DULoxetine 30 MG EC capsule    CYMBALTA    30 capsule    Take 1 capsule (30 mg) by mouth daily       folic acid 1 MG tablet    FOLVITE     Take 1 mg by mouth daily.       gabapentin 600 MG tablet    NEURONTIN    90 tablet    Take 600 mg in AM , 600 mg afternoon and 900 mg at HS.       HUMALOG KWIKPEN 200 UNIT/ML soln   Generic drug:  Insulin Lispro     75 mL    Inject 75 units at breakfast, 72 units with lunch and 68 with dinner plus sliding scale (2/40 over 180 starting at 4 units).  Approx  230 units daily       * hydrocortisone 1 % cream    CORTAID     Apply   topically 2 times daily.       * hydrocortisone 2.5 % cream      Apply  topically 2 times daily.       ibuprofen 400 MG tablet    ADVIL/MOTRIN     Take 400 mg by mouth every 6 hours as needed.       insulin glargine 100 UNIT/ML injection    LANTUS SOLOSTAR    60 mL    Take 80 units in the morning and 90 units in the evening.       insulin pen needle 31G X 5 MM    B-D U/F    150 each    Use 5 time(s) per day.  Please dispense as BD Pen Needle Mini U/F 31G x 5 MM       JOBST KNEE HIGH COMPRESSION SM Misc     2 each    JOBST 20-30MMHG COMPRESSION SM MISC  To both legs during waking hours daily for leg swelling and venous stasis dermatitis. Do not sleep in stockings.       * ketoconazole 2 % shampoo    NIZORAL    240 mL    To entire wet scalp and ears and then wash off after 5 minutes three times a week.       * ketoconazole 2 % cream    NIZORAL    60 g    Apply topically 2 times daily To affected right ear mixed with triamcinolone ointment until return to clinic.       liraglutide 18 MG/3ML Soln    VICTOZA    3 Month    Inject 1.8 mg Subcutaneous daily. Start with 0.6 mg x 5 days, then increase to 1.2 mg x 5 days, then 1.8 mg thereafter.  Do not advance to higher dose if you have nausea.       lisinopril 5 MG tablet    PRINIVIL/ZESTRIL    90 tablet    Take 2 tablets by mouth daily. Take one tab daily/Hold for SBP < 110       LYRICA PO          metoprolol 50 MG tablet    LOPRESSOR     Take 50 mg by mouth 2 times daily.       MILK OF MAGNESIA PO      Take 30 cc as needed PRN constipation.       minocycline 100 MG capsule    MINOCIN/DYNACIN    28 capsule    Take 1 capsule (100 mg) by mouth 2 times daily       mometasone 0.1 % cream    ELOCON    45 g    Apply sparingly to left medial ankle area daily.  Do not apply to face.       nitroglycerin 0.4 MG sublingual tablet    NITROSTAT     Place 0.4 mg under the tongue every 5 minutes as needed.       olopatadine HCl 0.2 % Soln    PATADAY    1 Bottle    Place 1 drop into both  eyes daily as needed       omeprazole 20 MG CR capsule    priLOSEC     Take  by mouth daily. Take one tab daily       pimecrolimus 1 % cream    ELIDEL    60 g    Apply topically 2 times daily To areas of rash at ears as needed until clear.       PLAVIX 75 MG tablet   Generic drug:  clopidogrel      Take 75 mg by mouth daily.       ranitidine 150 MG tablet    ZANTAC     Take 150 mg by mouth 2 times daily       sodium chloride 0.65 % nasal spray    OCEAN     Spray 1 spray in nostril daily as needed.       * triamcinolone 0.1 % ointment    KENALOG    60 g    Apply topically 2 times daily To rash on the right ear mixed with ketoconazole cream until return to clinic.       * triamcinolone 0.1 % ointment    KENALOG    60 g    Apply topically daily To legs under compression stockings for stasis dermatitis discoloration.       UNABLE TO FIND      MEDICATION NAME: milk of magnesia       XALATAN 0.005 % ophthalmic solution   Generic drug:  latanoprost      1 drop At Bedtime. Left eye       * Notice:  This list has 6 medication(s) that are the same as other medications prescribed for you. Read the directions carefully, and ask your doctor or other care provider to review them with you.

## 2017-06-22 ENCOUNTER — AMBULATORY - HEALTHEAST (OUTPATIENT)
Dept: GERIATRICS | Facility: CLINIC | Age: 67
End: 2017-06-22

## 2017-06-26 ENCOUNTER — OFFICE VISIT - HEALTHEAST (OUTPATIENT)
Dept: GERIATRICS | Facility: CLINIC | Age: 67
End: 2017-06-26

## 2017-06-26 DIAGNOSIS — E66.01 MORBID OBESITY DUE TO EXCESS CALORIES (H): ICD-10-CM

## 2017-06-26 DIAGNOSIS — R60.0 BILATERAL LOWER EXTREMITY EDEMA: ICD-10-CM

## 2017-06-26 DIAGNOSIS — E11.9 TYPE 2 DIABETES MELLITUS (H): ICD-10-CM

## 2017-06-26 DIAGNOSIS — F32.A DEPRESSION: ICD-10-CM

## 2017-06-26 DIAGNOSIS — I10 ESSENTIAL HYPERTENSION WITH GOAL BLOOD PRESSURE LESS THAN 130/80: ICD-10-CM

## 2017-06-26 ASSESSMENT — MIFFLIN-ST. JEOR: SCORE: 1562.8

## 2017-06-29 ENCOUNTER — AMBULATORY - HEALTHEAST (OUTPATIENT)
Dept: GERIATRICS | Facility: CLINIC | Age: 67
End: 2017-06-29

## 2017-07-31 ENCOUNTER — OFFICE VISIT - HEALTHEAST (OUTPATIENT)
Dept: GERIATRICS | Facility: CLINIC | Age: 67
End: 2017-07-31

## 2017-07-31 DIAGNOSIS — G62.9 NEUROPATHY: ICD-10-CM

## 2017-07-31 DIAGNOSIS — N18.9 CKD (CHRONIC KIDNEY DISEASE): ICD-10-CM

## 2017-07-31 DIAGNOSIS — I25.10 CAD (CORONARY ARTERY DISEASE): ICD-10-CM

## 2017-07-31 DIAGNOSIS — I10 ESSENTIAL HYPERTENSION: ICD-10-CM

## 2017-08-08 ENCOUNTER — OFFICE VISIT - HEALTHEAST (OUTPATIENT)
Dept: GERIATRICS | Facility: CLINIC | Age: 67
End: 2017-08-08

## 2017-08-08 DIAGNOSIS — R60.0 BILATERAL LOWER EXTREMITY EDEMA: ICD-10-CM

## 2017-08-08 DIAGNOSIS — F32.A DEPRESSION, UNSPECIFIED DEPRESSION TYPE: ICD-10-CM

## 2017-08-08 DIAGNOSIS — E11.9 TYPE 2 DIABETES MELLITUS (H): ICD-10-CM

## 2017-08-08 DIAGNOSIS — E66.01 MORBID OBESITY WITH BMI OF 40.0-44.9, ADULT (H): ICD-10-CM

## 2017-08-08 DIAGNOSIS — E11.42 DIABETIC PERIPHERAL NEUROPATHY ASSOCIATED WITH TYPE 2 DIABETES MELLITUS (H): ICD-10-CM

## 2017-08-08 DIAGNOSIS — N18.2 CKD (CHRONIC KIDNEY DISEASE), STAGE 2 (MILD): ICD-10-CM

## 2017-08-08 ASSESSMENT — MIFFLIN-ST. JEOR: SCORE: 1590.93

## 2017-08-15 ENCOUNTER — OFFICE VISIT (OUTPATIENT)
Dept: OPHTHALMOLOGY | Facility: CLINIC | Age: 67
End: 2017-08-15

## 2017-08-15 DIAGNOSIS — H52.13 MYOPIA, BILATERAL: ICD-10-CM

## 2017-08-15 DIAGNOSIS — H40.1133 PRIMARY OPEN ANGLE GLAUCOMA OF BOTH EYES, SEVERE STAGE: ICD-10-CM

## 2017-08-15 DIAGNOSIS — E11.9 TYPE 2 DIABETES MELLITUS WITHOUT RETINOPATHY (H): Primary | ICD-10-CM

## 2017-08-15 ASSESSMENT — TONOMETRY
OD_IOP_MMHG: 16
OS_IOP_MMHG: 16
IOP_METHOD: ICARE

## 2017-08-15 ASSESSMENT — CONF VISUAL FIELD
OS_NORMAL: 1
OD_NORMAL: 1

## 2017-08-15 ASSESSMENT — VISUAL ACUITY
OD_CC: J1
OS_CC+: -2
METHOD: SNELLEN - LINEAR
CORRECTION_TYPE: GLASSES
OS_CC: J5
OD_CC: 20/25
OS_CC: 20/60

## 2017-08-15 ASSESSMENT — REFRACTION_WEARINGRX
OS_CYLINDER: +0.75
OD_SPHERE: -1.00
OS_ADD: +2.50
OD_CYLINDER: +0.75
OS_SPHERE: -0.75
OS_AXIS: 025
OD_ADD: +2.50
OD_AXIS: 170

## 2017-08-15 ASSESSMENT — REFRACTION_MANIFEST
OD_SPHERE: -1.50
OS_ADD: +2.50
OD_AXIS: 170
OS_AXIS: 025
OD_ADD: +2.50
OS_SPHERE: -0.75
OS_CYLINDER: +0.75
OD_CYLINDER: +0.75

## 2017-08-15 ASSESSMENT — SLIT LAMP EXAM - LIDS
COMMENTS: NORMAL
COMMENTS: NORMAL

## 2017-08-15 ASSESSMENT — EXTERNAL EXAM - LEFT EYE: OS_EXAM: NORMAL

## 2017-08-15 ASSESSMENT — EXTERNAL EXAM - RIGHT EYE: OD_EXAM: NORMAL

## 2017-08-15 ASSESSMENT — CUP TO DISC RATIO
OD_RATIO: 0.8
OS_RATIO: 0.8

## 2017-08-15 NOTE — MR AVS SNAPSHOT
After Visit Summary   8/15/2017    Cristal Preciado    MRN: [id number]           Patient Information     Date Of Birth          1950        Visit Information        Provider Department      8/15/2017 2:00 PM José Miguel Cheng OD [facility]         Follow-ups after your visit        Your next 10 appointments already scheduled     Aug 17, 2017  2:15 PM CDT   (Arrive by 2:00 PM)   Return Visit with Rex Rubio MD   [facility] Dermatology ([facility])    [address] [place] [phone] [phone]            Sep 28, 2017  1:00 PM CDT   (Arrive by 12:45 PM)   Return Visit with Vu Grant DPM   [facility] Endocrinology ([facility])    [address] [place] [phone] [phone]            Dec 01, 2017  8:30 AM CST   RETURN GLAUCOMA with Bette Delong MD   [facility] ([facility])    [facility]  [address] [address]  [facility] [id number] [phone]              Who to contact     Please call your clinic at [phone] to:    Ask questions about your health    Make or cancel appointments    Discuss your medicines    Learn about your test results    Speak to your doctor   If you have compliments or concerns about an experience at your clinic, or if you wish to file a complaint, please contact [facility] Patient Relations at [phone] or email us at [email or web address]         Additional Information About Your Visit        MyChart Information     [employer] is an electronic gateway that provides easy, online access to your medical records. With [employer], you can request a clinic appointment, read your test results, renew a prescription or communicate with your care team.     To sign up for [employer] visit the website at [email or web address]   You will be asked to enter the access code listed  below, as well as some personal information. Please follow the directions to create your username and password.     Your access code is: [email or web address]  Expires: 2017  6:30 AM     Your access code will  in 90 days. If you need help or a new code, please contact your [facility] or call [phone] for assistance.        Care EveryWhere ID     This is your Care EveryWhere ID. This could be used by other organizations to access your [place] medical records  [id number]         Blood Pressure from Last 3 Encounters:   17 124/51   17 138/84   10/24/16 119/77    Weight from Last 3 Encounters:   16 106.5 kg (234 lb 12.8 oz)   03/02/15 105.7 kg (233 lb)   14 111.6 kg (246 lb)              Today, you had the following     No orders found for display       Primary Care Provider Office Phone # Fax #    Meagan CORNELIUS José Miguel [phone]       [facility] AFTER HOURS [address] [place] [address]        Equal Access to Services     TIN JANSEN AH: Hadii aad ku hadasho Soomaali, waaxda luqadaha, qaybta kaalmada adeegyada, camilla amezcua . So [facility] [phone].    ATENCIÓN: Si habla español, tiene a reyes disposición servicios gratuitos de asistencia lingüística. Llame al [phone].    We comply with applicable federal civil rights laws and [place] laws. We do not discriminate on the basis of race, color, national origin, age, disability sex, sexual orientation or gender identity.            Thank you!     Thank you for choosing [facility] OPHTHALMOLOGY  for your care. Our goal is always to provide you with excellent care. Hearing back from our patients is one way we can continue to improve our services. Please take a few minutes to complete the written survey that you may receive in the mail after your visit with us. Thank you!             Your Updated Medication List - Protect others around you: Learn how to safely  use, store and throw away your medicines at www.disposemymeds.org.          This list is accurate as of: 8/15/17  2:41 PM.  Always use your most recent med list.                   Brand Name Dispense Instructions for use Diagnosis    acetaminophen 500 MG tablet    TYLENOL     Take 1-2 tablets by mouth every 6 hours as needed. Tylenol Extra Strength.        allopurinol 100 MG tablet    ZYLOPRIM    180 tablet    Take 2 tablets by mouth daily.    Hyperuricemia       AMLACTIN FOOT CREAM THERAPY EX      Externally apply 12 % topically        artificial tears Oint ophthalmic ointment      0.25 inches At Bedtime        ARTIFICIAL TEARS OP      Apply  to eye as needed.        aspirin 325 MG tablet      Take 325 mg by mouth daily.        atorvastatin 80 MG tablet    LIPITOR    90 tablet    Take 1 tablet by mouth daily. Pt needs to have labs done prior to further refills.    Other and unspecified hyperlipidemia       CALCIUM CITRATE + D PO      600/400 mg/unit take 1 tabs daily twice daily.        calcium-vitamin D 500-125 MG-UNIT Tabs      Take 1 tablet by mouth 2 times daily        carboxymethylcellulose 0.5 % Soln ophthalmic solution    REFRESH PLUS     1 drop 3 times daily as needed.        chlorthalidone 12.5 mg Tabs half-tab    HYGROTON     Take 12.5 mg by mouth daily.        CLARITIN 10 MG tablet   Generic drug:  loratadine      Take 10 mg by mouth as needed.        clobetasol 0.05 % external solution    TEMOVATE    60 mL    Apply topically 2 times daily To itchy and scaly areas of scalp as needed.    Dermatitis, seborrheic       clotrimazole 1 % cream    LOTRIMIN    60 g    To toenails twice daily.    Dermatophytosis of nail, DM neuro manif type II       dorzolamide-timolol 2-0.5 % ophthalmic solution    COSOPT     Place 1 drop into both eyes 2 times daily        DULoxetine 30 MG EC capsule    CYMBALTA    30 capsule    Take 1 capsule (30 mg) by mouth daily    Depression       folic acid 1 MG tablet    FOLVITE     Take  1 mg by mouth daily.        gabapentin 600 MG tablet    NEURONTIN    90 tablet    Take 600 mg in AM , 600 mg afternoon and 900 mg at HS.    Diabetic neuropathy (H)       HUMALOG KWIKPEN 200 UNIT/ML soln   Generic drug:  Insulin Lispro     75 mL    Inject 75 units at breakfast, 72 units with lunch and 68 with dinner plus sliding scale (2/40 over 180 starting at 4 units).  Approx  230 units daily    Type 2 diabetes mellitus, controlled (H)       * hydrocortisone 1 % cream    CORTAID     Apply  topically 2 times daily.        * hydrocortisone 2.5 % cream      Apply  topically 2 times daily.        ibuprofen 400 MG tablet    ADVIL/MOTRIN     Take 400 mg by mouth every 6 hours as needed.        insulin glargine 100 UNIT/ML injection    LANTUS SOLOSTAR    60 mL    Take 80 units in the morning and 90 units in the evening.    Type 2 diabetes mellitus, controlled (H)       insulin pen needle 31G X 5 MM    B-D U/F    150 each    Use 5 time(s) per day.  Please dispense as BD Pen Needle Mini U/F 31G x 5 MM    Type 2 diabetes mellitus, controlled (H)       JOBST KNEE HIGH COMPRESSION SM Misc     2 each    JOBST 20-30MMHG COMPRESSION SM MISC  To both legs during waking hours daily for leg swelling and venous stasis dermatitis. Do not sleep in stockings.    Venous stasis dermatitis of both lower extremities       * ketoconazole 2 % shampoo    NIZORAL    240 mL    To entire wet scalp and ears and then wash off after 5 minutes three times a week.    Dermatitis, seborrheic       * ketoconazole 2 % cream    NIZORAL    60 g    Apply topically 2 times daily To affected right ear mixed with triamcinolone ointment until return to clinic.    Dermatitis       liraglutide 18 MG/3ML Soln    VICTOZA    3 Month    Inject 1.8 mg Subcutaneous daily. Start with 0.6 mg x 5 days, then increase to 1.2 mg x 5 days, then 1.8 mg thereafter.  Do not advance to higher dose if you have nausea.    Diabetes mellitus, type 2 (H)       lisinopril 5 MG tablet     PRINIVIL/ZESTRIL    90 tablet    Take 2 tablets by mouth daily. Take one tab daily/Hold for SBP < 110    Essential hypertension, benign       LYRICA PO           metoprolol 50 MG tablet    LOPRESSOR     Take 50 mg by mouth 2 times daily.        MILK OF MAGNESIA PO      Take 30 cc as needed PRN constipation.        minocycline 100 MG capsule    MINOCIN/DYNACIN    28 capsule    Take 1 capsule (100 mg) by mouth 2 times daily    Dermatitis, seborrheic       mometasone 0.1 % cream    ELOCON    45 g    Apply sparingly to left medial ankle area daily.  Do not apply to face.        nitroGLYcerin 0.4 MG sublingual tablet    NITROSTAT     Place 0.4 mg under the tongue every 5 minutes as needed.        olopatadine HCl 0.2 % Soln    PATADAY    1 Bottle    Place 1 drop into both eyes daily as needed    History of itching of eye       omeprazole 20 MG CR capsule    priLOSEC     Take  by mouth daily. Take one tab daily        pimecrolimus 1 % cream    ELIDEL    60 g    Apply topically 2 times daily To areas of rash at ears as needed until clear.    Dermatitis, seborrheic       PLAVIX 75 MG tablet   Generic drug:  clopidogrel      Take 75 mg by mouth daily.        ranitidine 150 MG tablet    ZANTAC     Take 150 mg by mouth 2 times daily        sodium chloride 0.65 % nasal spray    OCEAN     Spray 1 spray in nostril daily as needed.        * triamcinolone 0.1 % ointment    KENALOG    60 g    Apply topically 2 times daily To rash on the right ear mixed with ketoconazole cream until return to clinic.    Dermatitis       * triamcinolone 0.1 % ointment    KENALOG    60 g    Apply topically daily To legs under compression stockings for stasis dermatitis discoloration.    Venous stasis dermatitis of both lower extremities       UNABLE TO FIND      MEDICATION NAME: milk of magnesia        XALATAN 0.005 % ophthalmic solution   Generic drug:  latanoprost      1 drop At Bedtime. Left eye        * Notice:  This list has 6 medication(s) that  are the same as other medications prescribed for you. Read the directions carefully, and ask your doctor or other care provider to review them with you.

## 2017-08-15 NOTE — PROGRESS NOTES
Assessment/Plan  (E11.9) Type 2 diabetes mellitus without retinopathy (H)  (primary encounter diagnosis)  Comment: No retinopathy OU  Plan:  Educated patient on clinical findings and the importance of continued management with primary care physician. Continue management as directed and return to clinic in 1 year for dilated exam, or sooner, as needed.    (H52.13) Myopia, bilateral  Comment: Myopic astigmatism with presbyopia OU  Plan: REFRACTION [42285]   Dispensed spectacle prescription for full time wear. Educated patient on possibility of adaptation period, if symptoms do not improve return to clinic for further testing.    (H40.1133) Primary open angle glaucoma of both eyes, severe stage [H40.11X3]  Comment: Patient reports compliance with medications, tubes appear stable, pressure stable to previous visits  Plan:  Referred to Dr. Delong for continued management.      Complete documentation of historical and exam elements from today's encounter can  be found in the full encounter summary report (not reduplicated in this progress  note). I personally obtained the chief complaint(s) and history of present illness. I  confirmed and edited as necessary the review of systems, past medical/surgical  history, family history, social history, and examination findings as documented by  others; and I examined the patient myself. I personally reviewed the relevant tests,  images, and reports as documented above. I formulated and edited as necessary the  assessment and plan and discussed the findings and management plan with the  patient and family.    José Miguel Cheng, OD, FAAO

## 2017-08-15 NOTE — NURSING NOTE
Chief Complaints and History of Present Illnesses   Patient presents with     Eye Exam For Diabetes     Blurred Vision Both Eyes     HPI    Affected eye(s):  Both   Symptoms:     Blurred vision   Tearing      Frequency:  Constant       Do you have eye pain now?:  No      Comments:  Pt states here for annual exam, curious to why she was not schedule with Dr. Delong. Pt states vision is more blurred and always tearing for allergies. Pt having most trouble with reading.    Lani Fischer COT 1:52 PM August 15, 2017

## 2017-08-16 ENCOUNTER — AMBULATORY - HEALTHEAST (OUTPATIENT)
Dept: GERIATRICS | Facility: CLINIC | Age: 67
End: 2017-08-16

## 2017-08-17 ENCOUNTER — OFFICE VISIT (OUTPATIENT)
Dept: DERMATOLOGY | Facility: CLINIC | Age: 67
End: 2017-08-17

## 2017-08-17 DIAGNOSIS — L21.9 DERMATITIS, SEBORRHEIC: ICD-10-CM

## 2017-08-17 DIAGNOSIS — L30.9 CHRONIC DERMATITIS OF HANDS: Primary | ICD-10-CM

## 2017-08-17 RX ORDER — CLOBETASOL PROPIONATE 0.5 MG/ML
SOLUTION TOPICAL 2 TIMES DAILY
Qty: 60 ML | Refills: 3 | Status: SHIPPED | OUTPATIENT
Start: 2017-08-17 | End: 2018-02-20

## 2017-08-17 RX ORDER — TRIAMCINOLONE ACETONIDE 1 MG/G
OINTMENT TOPICAL
Qty: 80 G | Refills: 1 | Status: SHIPPED | OUTPATIENT
Start: 2017-08-17 | End: 2018-03-16

## 2017-08-17 ASSESSMENT — PAIN SCALES - GENERAL: PAINLEVEL: NO PAIN (0)

## 2017-08-17 NOTE — PATIENT INSTRUCTIONS
Apply vaseline to the hands at night.  Apply the triamcinolone 0.1% ointment to the right ear 1-2x per day.  Apply the clobetasol 0.05% solution to the scalp after showering.  Continue to wash with ketoconazole 2% shampoo  2x per week.

## 2017-08-17 NOTE — MR AVS SNAPSHOT
After Visit Summary   8/17/2017    Cristal Preciado    MRN: [id number]           Patient Information     Date Of Birth          1950        Visit Information        Provider Department      8/17/2017 2:15 PM Rex Rubio MD [facility] D[facility]at[facility]        Today's Diagnoses     Dermatitis, seborrheic          Care Instructions    Apply vaseline to the hands at night.  Apply the triamcinolone 0.1% ointment to the right ear 1-2x per day.  Apply the clobetasol 0.05% solution to the scalp after showering.  Continue to wash with ketoconazole 2% shampoo  2x per week.           Follow-ups after your visit        Follow-up notes from your care team     Return in about 6 months (around 2/17/2018).      Your next 10 appointments already scheduled     Sep 28, 2017  1:00 PM CDT   (Arrive by 12:45 PM)   Return Visit with Vu Grant DPM   [facility] ([facility])    [address]  [address] [place] [address] [phone]            Dec 01, 2017  8:30 AM CST   RETURN GLAUCOMA with Bette Delong MD   [facility] ([facility])    [address]  [address]  [address] [address] [address]  [place] [address] [phone]              Who to contact     Please call your clinic at [phone] to:    Ask questions about your health    Make or cancel appointments    Discuss your medicines    Learn about your test results    Speak to your doctor   If you have compliments or concerns about an experience at your clinic, or if you wish to file a complaint, please contact [facility] Patient Relations at [phone] or email us at [email or web address]         Additional Information About Your Visit        Care EveryWhere ID     This is your Care EveryWhere ID. This could be used by other organizations to access your [place] medical records  [id number]         Blood Pressure from Last 3 Encounters:   06/05/17  124/51   02/27/17 138/84   10/24/16 119/77    Weight from Last 3 Encounters:   07/20/16 106.5 kg (234 lb 12.8 oz)   03/02/15 105.7 kg (233 lb)   03/06/14 111.6 kg (246 lb)              Today, you had the following     No orders found for display         Today's Medication Changes          These changes are accurate as of: 8/17/17  3:28 PM.  If you have any questions, ask your nurse or doctor.               These medicines have changed or have updated prescriptions.        Dose/Directions    * triamcinolone 0.1 % ointment   Commonly known as:  KENALOG   This may have changed:  Another medication with the same name was added. Make sure you understand how and when to take each.   Used for:  Dermatitis   Changed by:  Lana Cotto MD        Apply topically 2 times daily To rash on the right ear mixed with ketoconazole cream until return to clinic.   Quantity:  60 g   Refills:  5       * triamcinolone 0.1 % ointment   Commonly known as:  KENALOG   This may have changed:  Another medication with the same name was added. Make sure you understand how and when to take each.   Used for:  Venous stasis dermatitis of both lower extremities   Changed by:  Lana Cotto MD        Apply topically daily To legs under compression stockings for stasis dermatitis discoloration.   Quantity:  60 g   Refills:  5       * triamcinolone 0.1 % ointment   Commonly known as:  KENALOG   This may have changed:  You were already taking a medication with the same name, and this prescription was added. Make sure you understand how and when to take each.   Used for:  Dermatitis, seborrheic   Changed by:  Rex Rubio MD        Twice a day to area of rash and itch on right ear   Quantity:  80 g   Refills:  1       * Notice:  This list has 3 medication(s) that are the same as other medications prescribed for you. Read the directions carefully, and ask your doctor or other care provider to review them with you.         Where  to get your medicines      These medications were sent to [id number] - [place], [place] - [address]  [address], [facility] [address]     Phone:  [phone]     clobetasol 0.05 % external solution    triamcinolone 0.1 % ointment                Primary Care Provider Office Phone # Fax #    Meagan Valdez [phone]       [facility] [address] [place] [address]        Equal Access to Services     [facility]: Hadii aad ku hadasho Soomaali, waaxda luqadaha, qaybta kaalmada adeegyada, waxay idiin hayaan adeeg kharaeloy lasophia . So [facility] [phone].    ATENCIÓN: Si habla español, tiene a reyes disposición servicios gratuitos de asistencia lingüística. [facility] [phone].    We comply with applicable federal civil rights laws and [place] laws. We do not discriminate on the basis of race, color, national origin, age, disability sex, sexual orientation or gender identity.            Thank you!     Thank you for choosing [facility] DERMATOLOG[facility]  for your care. Our goal is always to provide you with excellent care. Hearing back from our patients is one way we can continue to improve our services. Please take a few minutes to complete the written survey that you may receive in the mail after your visit with us. Thank you!             Your Updated Medication List - Protect others around you: Learn how to safely use, store and throw away your medicines at www.disposemymeds.org.          This list is accurate as of: 8/17/17  3:28 PM.  Always use your most recent med list.                   Brand Name Dispense Instructions for use Diagnosis    acetaminophen 500 MG tablet    TYLENOL     Take 1-2 tablets by mouth every 6 hours as needed. Tylenol Extra Strength.        allopurinol 100 MG tablet    ZYLOPRIM    180 tablet    Take 2 tablets by mouth daily.    Hyperuricemia       AMLACTIN FOOT CREAM THERAPY EX      Externally apply 12 % topically         artificial tears Oint ophthalmic ointment      0.25 inches At Bedtime        ARTIFICIAL TEARS OP      Apply  to eye as needed.        aspirin 325 MG tablet      Take 325 mg by mouth daily.        atorvastatin 80 MG tablet    LIPITOR    90 tablet    Take 1 tablet by mouth daily. Pt needs to have labs done prior to further refills.    Other and unspecified hyperlipidemia       CALCIUM CITRATE + D PO      600/400 mg/unit take 1 tabs daily twice daily.        calcium-vitamin D 500-125 MG-UNIT Tabs      Take 1 tablet by mouth 2 times daily        carboxymethylcellulose 0.5 % Soln ophthalmic solution    REFRESH PLUS     1 drop 3 times daily as needed.        chlorthalidone 12.5 mg Tabs half-tab    HYGROTON     Take 12.5 mg by mouth daily.        CLARITIN 10 MG tablet   Generic drug:  loratadine      Take 10 mg by mouth as needed.        clobetasol 0.05 % external solution    TEMOVATE    60 mL    Apply topically 2 times daily To itchy and scaly areas of scalp as needed.    Dermatitis, seborrheic       clotrimazole 1 % cream    LOTRIMIN    60 g    To toenails twice daily.    Dermatophytosis of nail, DM neuro manif type II       dorzolamide-timolol 2-0.5 % ophthalmic solution    COSOPT     Place 1 drop into both eyes 2 times daily        DULoxetine 30 MG EC capsule    CYMBALTA    30 capsule    Take 1 capsule (30 mg) by mouth daily    Depression       folic acid 1 MG tablet    FOLVITE     Take 1 mg by mouth daily.        gabapentin 600 MG tablet    NEURONTIN    90 tablet    Take 600 mg in AM , 600 mg afternoon and 900 mg at HS.    Diabetic neuropathy (H)       HUMALOG KWIKPEN 200 UNIT/ML soln   Generic drug:  Insulin Lispro     75 mL    Inject 75 units at breakfast, 72 units with lunch and 68 with dinner plus sliding scale (2/40 over 180 starting at 4 units).  Approx  230 units daily    Type 2 diabetes mellitus, controlled (H)       * hydrocortisone 1 % cream    CORTAID     Apply  topically 2 times daily.        *  hydrocortisone 2.5 % cream      Apply  topically 2 times daily.        ibuprofen 400 MG tablet    ADVIL/MOTRIN     Take 400 mg by mouth every 6 hours as needed.        insulin glargine 100 UNIT/ML injection    LANTUS SOLOSTAR    60 mL    Take 80 units in the morning and 90 units in the evening.    Type 2 diabetes mellitus, controlled (H)       insulin pen needle 31G X 5 MM    B-D U/F    150 each    Use 5 time(s) per day.  Please dispense as BD Pen Needle Mini U/F 31G x 5 MM    Type 2 diabetes mellitus, controlled (H)       JOBST KNEE HIGH COMPRESSION SM Misc     2 each    JOBST 20-30MMHG COMPRESSION SM MISC  To both legs during waking hours daily for leg swelling and venous stasis dermatitis. Do not sleep in stockings.    Venous stasis dermatitis of both lower extremities       * ketoconazole 2 % shampoo    NIZORAL    240 mL    To entire wet scalp and ears and then wash off after 5 minutes three times a week.    Dermatitis, seborrheic       * ketoconazole 2 % cream    NIZORAL    60 g    Apply topically 2 times daily To affected right ear mixed with triamcinolone ointment until return to clinic.    Dermatitis       liraglutide 18 MG/3ML Soln    VICTOZA    3 Month    Inject 1.8 mg Subcutaneous daily. Start with 0.6 mg x 5 days, then increase to 1.2 mg x 5 days, then 1.8 mg thereafter.  Do not advance to higher dose if you have nausea.    Diabetes mellitus, type 2 (H)       lisinopril 5 MG tablet    PRINIVIL/ZESTRIL    90 tablet    Take 2 tablets by mouth daily. Take one tab daily/Hold for SBP < 110    Essential hypertension, benign       LYRICA PO           metoprolol 50 MG tablet    LOPRESSOR     Take 50 mg by mouth 2 times daily.        MILK OF MAGNESIA PO      Take 30 cc as needed PRN constipation.        minocycline 100 MG capsule    MINOCIN/DYNACIN    28 capsule    Take 1 capsule (100 mg) by mouth 2 times daily    Dermatitis, seborrheic       mometasone 0.1 % cream    ELOCON    45 g    Apply sparingly to left  medial ankle area daily.  Do not apply to face.        nitroGLYcerin 0.4 MG sublingual tablet    NITROSTAT     Place 0.4 mg under the tongue every 5 minutes as needed.        olopatadine HCl 0.2 % Soln    PATADAY    1 Bottle    Place 1 drop into both eyes daily as needed    History of itching of eye       omeprazole 20 MG CR capsule    priLOSEC     Take  by mouth daily. Take one tab daily        pimecrolimus 1 % cream    ELIDEL    60 g    Apply topically 2 times daily To areas of rash at ears as needed until clear.    Dermatitis, seborrheic       PLAVIX 75 MG tablet   Generic drug:  clopidogrel      Take 75 mg by mouth daily.        ranitidine 150 MG tablet    ZANTAC     Take 150 mg by mouth 2 times daily        sodium chloride 0.65 % nasal spray    OCEAN     Spray 1 spray in nostril daily as needed.        * triamcinolone 0.1 % ointment    KENALOG    60 g    Apply topically 2 times daily To rash on the right ear mixed with ketoconazole cream until return to clinic.    Dermatitis       * triamcinolone 0.1 % ointment    KENALOG    60 g    Apply topically daily To legs under compression stockings for stasis dermatitis discoloration.    Venous stasis dermatitis of both lower extremities       * triamcinolone 0.1 % ointment    KENALOG    80 g    Twice a day to area of rash and itch on right ear    Dermatitis, seborrheic       UNABLE TO FIND      MEDICATION NAME: milk of magnesia        XALATAN 0.005 % ophthalmic solution   Generic drug:  latanoprost      1 drop At Bedtime. Left eye        * Notice:  This list has 7 medication(s) that are the same as other medications prescribed for you. Read the directions carefully, and ask your doctor or other care provider to review them with you.

## 2017-08-17 NOTE — LETTER
8/17/2017       RE: Cristal Preciado  [facility]  [address]  [place] [address]     Dear Colleague,    Thank you for referring your patient, Cristal Preciado, to the [facility] DERMAT[facility] at [facility] Please see a copy of my visit note below.    [facility] Dermatology Note    Dermatology Problem List:  1. Seborrheic dermatitis: scalp and R ear  -current treatment: ketoconazole 2% shampoo 2x per week and clobetasol 0.05% solution BID daily as needed for scalp, and TAC 0.1% ointment BID to R ear    2. Hand dermatitis   -current treatment: Apply moisturizer (i.e. Vaseline, Eucerin or Aquaphor) to bilateral hands daily    3. Venous stasis dermatitis   -continue to moisturize and wear compression stalking daily    Encounter Date: Aug 17, 2017    CC:  Chief Complaint   Patient presents with     Derm Problem     Cristal is here for concerns of a rash on her right ear.  States she has had for almost a year now.     History of Present Illness:  Ms. Cristal Preciado is a 67 year old female who presents as a follow-up for seborrheic dermatitis and venous stasis dermatitis. The patient was last seen by Dr. Cotto when Ms. Preciado was recommended to continue to use ketoconazole 2% shampoo for the seborrheic dermatitis on her scalp and Elidel 1% cream for her ears and face.  Since her last visit, her venous stasis dermatitis has significantly improved since she started wearing compression stockings daily.  She has not developed any erosions or ulcerations on her lower extremities for >6 months.      A few months ago, she ran out of the clobetasol 0.05% solution that she was applying to her scalp after showering.  Ms. Preciado is currently washing her scalp with ketoconazole 2% shampoo 2x per week. She has noticed an increased scalp pruritus since she has stopped using the clobetasol 0.05% solution.  She denies any scalp pain or burning.  Ms. Preciado continues to have an itchy scale on her R ear lobe and external ear canal, which she is currently treating with Elidel 1% cream. She has denies any new or changing lesions she would like to address today.     Past Medical History:   Patient Active Problem List   Diagnosis     Cataract     CAD (coronary artery disease)     Diabetes mellitus, type 2 (H)     Diabetic nephropathy (H)     Diabetic neuropathy (H)     Diabetic retinopathy (H)     GERD (gastroesophageal reflux disease)     Glaucoma     Hypertension     Hyperlipidemia     CVA (cerebral vascular accident) (H)     Morbid obesity (H)     Anemia     Seasonal allergies     Depression     Tubular adenoma of colon     Leg weakness     Dermatitis, seborrheic     Eczematous dermatitis     History of coronary artery disease     Venous stasis dermatitis of both lower extremities     Uncontrolled type 2 diabetes mellitus (H)     Past Medical History:   Diagnosis Date     AION (anterior ischaemic optic neuropathy), left eye     NAION LE     CAD (coronary artery disease) 3/2009    [facility]; Angio 2013 UM- normal coronary arteries     Cataract      CVA (cerebral vascular accident) (H)     admitted at [facility]     on Cymbalta     Diabetes mellitus, type 2 (H)      Diabetic nephropathy (H)      Diabetic neuropathy (H)     severe     Diabetic retinopathy (H)      GERD (gastroesophageal reflux disease)      Hyperlipidemia      Hypertension     ECHO 2013, TDS, NL EF     POAG (primary open-angle glaucoma)     adv BE     Seasonal allergies      Tubular adenoma of colon 2013    repeat colonoscopy in 2018     Past Surgical History:   Procedure Laterality Date     COLONOSCOPY  7/15/2013    Tubular adenoma; repeat in 2018;Procedure: COMBINED COLONOSCOPY, SINGLE BIOPSY/POLYPECTOMY BY BIOPSY;;  Surgeon: Don King MD;  Tubular adenoma     EXTRACAPSULAR CATARACT EXTRATION WITH INTRAOCULAR LENS IMPLANT  11-10-09, 2-9-10    11-10-09 Lt,  2-9-10 Rt; Left eye 8/2012     Social History:  The patient lives in an assisted living facility.      Family History:  There is no family history of skin cancer.    Medications:  Current Outpatient Prescriptions   Medication Sig Dispense Refill     HUMALOG KWIKPEN 200 UNIT/ML soln Inject 75 units at breakfast, 72 units with lunch and 68 with dinner plus sliding scale (2/40 over 180 starting at 4 units).  Approx  230 units daily 75 mL 11     Elastic Bandages & Supports (JOBST KNEE HIGH COMPRESSION SM) MISC JOBST 20-30MMHG COMPRESSION SM MISC   To both legs during waking hours daily for leg swelling and venous stasis dermatitis. Do not sleep in stockings. 2 each 3     olopatadine HCl (PATADAY) 0.2 % SOLN Place 1 drop into both eyes daily as needed 1 Bottle 11     insulin glargine (LANTUS SOLOSTAR) 100 UNIT/ML PEN Take 80 units in the morning and 90 units in the evening. 60 mL 11     insulin pen needle (B-D U/F) 31G X 5 MM Use 5 time(s) per day.  Please dispense as BD Pen Needle Mini U/F 31G x 5  each 11     Pregabalin (LYRICA PO)        pimecrolimus (ELIDEL) 1 % cream Apply topically 2 times daily To areas of rash at ears as needed until clear. 60 g 11     triamcinolone (KENALOG) 0.1 % ointment Apply topically daily To legs under compression stockings for stasis dermatitis discoloration. 60 g 5     Podiatric Products (AMLACTIN FOOT CREAM THERAPY EX) Externally apply 12 % topically       artificial tears OINT ophthalmic ointment 0.25 inches At Bedtime       calcium-vitamin D 500-125 MG-UNIT TABS Take 1 tablet by mouth 2 times daily       UNABLE TO FIND MEDICATION NAME: milk of magnesia       minocycline (MINOCIN,DYNACIN) 100 MG capsule Take 1 capsule (100 mg) by mouth 2 times daily 28 capsule 5     ketoconazole (NIZORAL) 2 % shampoo To entire wet scalp and ears and then wash off after 5 minutes three times a week. 240 mL 11     clobetasol (TEMOVATE) 0.05 % external solution Apply topically 2 times daily To itchy  and scaly areas of scalp as needed. 60 mL 11     ketoconazole (NIZORAL) 2 % cream Apply topically 2 times daily To affected right ear mixed with triamcinolone ointment until return to clinic. 60 g 5     triamcinolone (KENALOG) 0.1 % ointment Apply topically 2 times daily To rash on the right ear mixed with ketoconazole cream until return to clinic. 60 g 5     gabapentin (NEURONTIN) 600 MG tablet Take 600 mg in AM , 600 mg afternoon and 900 mg at HS. 90 tablet 1     DULoxetine (CYMBALTA) 30 MG capsule Take 1 capsule (30 mg) by mouth daily 30 capsule 4     clotrimazole (LOTRIMIN) 1 % cream To toenails twice daily. 60 g 6     dorzolamide-timolol (COSOPT) 2-0.5 % ophthalmic solution Place 1 drop into both eyes 2 times daily       ranitidine (ZANTAC) 150 MG tablet Take 150 mg by mouth 2 times daily       mometasone (ELOCON) 0.1 % cream Apply sparingly to left medial ankle area daily.  Do not apply to face. 45 g 0     carboxymethylcellulose (REFRESH PLUS) 0.5 % SOLN 1 drop 3 times daily as needed.       omeprazole (PRILOSEC) 20 MG capsule Take  by mouth daily. Take one tab daily       sodium chloride (SODIUM CHLORIDE) 0.65 % nasal spray Spray 1 spray in nostril daily as needed.       lisinopril (PRINIVIL,ZESTRIL) 5 MG tablet Take 2 tablets by mouth daily. Take one tab daily/Hold for SBP < 110 90 tablet 3     latanoprost (XALATAN) 0.005 % ophthalmic solution 1 drop At Bedtime. Left eye       allopurinol (ZYLOPRIM) 100 MG tablet Take 2 tablets by mouth daily. 180 tablet 1     hydrocortisone 1 % cream Apply  topically 2 times daily.       hydrocortisone 2.5 % cream Apply  topically 2 times daily.       liraglutide (VICTOZA) 18 MG/3ML SOLN Inject 1.8 mg Subcutaneous daily. Start with 0.6 mg x 5 days, then increase to 1.2 mg x 5 days, then 1.8 mg thereafter.  Do not advance to higher dose if you have nausea. 3 Month 3     ibuprofen (ADVIL,MOTRIN) 400 MG tablet Take 400 mg by mouth every 6 hours as needed.       chlorthalidone  (HYGROTEN) 12.5 MG TABS Take 12.5 mg by mouth daily.       loratadine (CLARITIN) 10 MG tablet Take 10 mg by mouth as needed.       Hypromellose (ARTIFICIAL TEARS OP) Apply  to eye as needed.       Calcium Citrate-Vitamin D (CALCIUM CITRATE + D PO) 600/400 mg/unit take 1 tabs daily twice daily.        nitroGLYCERIN (NITROSTAT) 0.4 MG SL tablet Place 0.4 mg under the tongue every 5 minutes as needed.       acetaminophen (TYLENOL) 500 MG tablet Take 1-2 tablets by mouth every 6 hours as needed. Tylenol Extra Strength.        Magnesium Hydroxide (MILK OF MAGNESIA PO) Take 30 cc as needed PRN constipation.       atorvastatin (LIPITOR) 80 MG tablet Take 1 tablet by mouth daily. Pt needs to have labs done prior to further refills. 90 tablet 0     folic acid (FOLVITE) 1 MG tablet Take 1 mg by mouth daily.       metoprolol (LOPRESSOR) 50 MG tablet Take 50 mg by mouth 2 times daily.       aspirin 325 MG tablet Take 325 mg by mouth daily.       clopidogrel (PLAVIX) 75 MG tablet Take 75 mg by mouth daily.       Allergies   Allergen Reactions     Dust Mites Other (See Comments)     Sneezing runny eyes and nose.     Food Allergy Formula Hives     Mountain Dew and Walnuts     Pollen Extract Other (See Comments)     Sneezing runny eyes and nose.     Review of Systems:  -As per HPI  -Constitutional: The patient denies fatigue, fevers, chills, unintended weight loss, and night sweats.  -HEENT: Patient denies nonhealing oral sores.  -Skin: As above in HPI. No additional skin concerns.    Physical exam:  Vitals: There were no vitals taken for this visit.  GEN: This is a well developed, well-nourished female in no acute distress, in a pleasant mood.    SKIN: Focused examination of the face, scalp, and bilateral lower extremities was performed.  -minimal white scale present along frontal scalp  -Diffuse white scale and lichenification present on R ear, predominantly on the lobule and helix of the ear  -mild accentuation of the skin lines  present on bilateral dorsal hands  -dark brown sclerotic plaques with a rim of hyperpigmentation present on medial aspect of bilateral lower extremities  -No other lesions of concern on areas examined.     Impression/Plan:  1. Seborrheic dermatitis: minimal white scale present on scalp at visit today, but white scale and lichenification present on R external ear    For scalp seborrheic dermatitis: Continue ketoconazole 2% shampoo 2x per week, and clobetasol 0.05% solution up to BID as needed for scalp pruritus    For seborrheic dermatitis on R ear: apply a thin layer of TAC 0.1% ointment to R ear BID until resolves     2. Hand dermatitis: mild accentuation of the skin lines present on bilateral dorsal hands    Recommended Ms. Preciado apply a moisturizer to her hands at least once per day.  She currently is using Vaseline as a moisturizer for her legs. Informed her that she can also use the Vaseline as a hand moisturizer as well    3. Venous stasis dermatitis: no open erosions or ulcerations present on examination today. Sclerotic plaques are present on the bilateral medial lower extremities from hemosiderin deposition and concomitant post-inflammatory hyperpigmentation.     Continue daily moisturization and use of compression stalkings daily    Follow-up in 6 months, earlier for new or changing lesions.     Staff Involved:  Scribed by Jodi Be MS4 for Dr. Rubio.      The medical student acted as a scribe with respect to this patient.  I have performed all the key elements of the history and physical.    Rex Rubio MD  Dermatology Attending

## 2017-08-17 NOTE — NURSING NOTE
Dermatology Rooming Note    Cristal JAZMYN Ozzy's goals for this visit include:   Chief Complaint   Patient presents with     Derm Problem     Cristal is here for concerns of a rash on her right ear.  States she has had for almost a year now.       Sejal Lewis LPN

## 2017-08-17 NOTE — PROGRESS NOTES
[facility] Dermatology Note    Dermatology Problem List:  1. Seborrheic dermatitis: scalp and R ear  -current treatment: ketoconazole 2% shampoo 2x per week and clobetasol 0.05% solution BID daily as needed for scalp, and TAC 0.1% ointment BID to R ear    2. Hand dermatitis   -current treatment: Apply moisturizer (i.e. Vaseline, Eucerin or Aquaphor) to bilateral hands daily    3. Venous stasis dermatitis   -continue to moisturize and wear compression stalking daily    Encounter Date: Aug 17, 2017    CC:  Chief Complaint   Patient presents with     Derm Problem     Cristal is here for concerns of a rash on her right ear.  States she has had for almost a year now.     History of Present Illness:  Ms. Cristal Preciado is a 67 year old female who presents as a follow-up for seborrheic dermatitis and venous stasis dermatitis. The patient was last seen by Dr. Cotto when Ms. Preciado was recommended to continue to use ketoconazole 2% shampoo for the seborrheic dermatitis on her scalp and Elidel 1% cream for her ears and face.  Since her last visit, her venous stasis dermatitis has significantly improved since she started wearing compression stockings daily.  She has not developed any erosions or ulcerations on her lower extremities for >6 months.      A few months ago, she ran out of the clobetasol 0.05% solution that she was applying to her scalp after showering.  Ms. Preciado is currently washing her scalp with ketoconazole 2% shampoo 2x per week. She has noticed an increased scalp pruritus since she has stopped using the clobetasol 0.05% solution.  She denies any scalp pain or burning. Ms. Preciado continues to have an itchy scale on her R ear lobe and external ear canal, which she is currently treating with Elidel 1% cream. She has denies any new or changing lesions she would like to address today.     Past Medical History:   Patient Active Problem List   Diagnosis     Cataract     CAD (coronary  artery disease)     Diabetes mellitus, type 2 (H)     Diabetic nephropathy (H)     Diabetic neuropathy (H)     Diabetic retinopathy (H)     GERD (gastroesophageal reflux disease)     Glaucoma     Hypertension     Hyperlipidemia     CVA (cerebral vascular accident) (H)     Morbid obesity (H)     Anemia     Seasonal allergies     Depression     Tubular adenoma of colon     Leg weakness     Dermatitis, seborrheic     Eczematous dermatitis     History of coronary artery disease     Venous stasis dermatitis of both lower extremities     Uncontrolled type 2 diabetes mellitus (H)     Past Medical History:   Diagnosis Date     AION (anterior ischaemic optic neuropathy), left eye     NAION LE     CAD (coronary artery disease) 3/2009    [facility]; Angio 2013 [facility]- normal coronary arteries     Cataract      CVA (cerebral vascular accident) (H)     admitted at [facility]     on Cymbalta     Diabetes mellitus, type 2 (H)      Diabetic nephropathy (H)      Diabetic neuropathy (H)     severe     Diabetic retinopathy (H)      GERD (gastroesophageal reflux disease)      Hyperlipidemia      Hypertension     ECHO 2013, TDS, NL EF     POAG (primary open-angle glaucoma)     adv BE     Seasonal allergies      Tubular adenoma of colon 2013    repeat colonoscopy in 2018     Past Surgical History:   Procedure Laterality Date     COLONOSCOPY  7/15/2013    Tubular adenoma; repeat in 2018;Procedure: COMBINED COLONOSCOPY, SINGLE BIOPSY/POLYPECTOMY BY BIOPSY;;  Surgeon: Don King MD;  Tubular adenoma     EXTRACAPSULAR CATARACT EXTRATION WITH INTRAOCULAR LENS IMPLANT  11-10-09, 2-9-10    11-10-09 Lt, 2-9-10 Rt; Left eye 8/2012     Social History:  The patient lives in an assisted living facility.      Family History:  There is no family history of skin cancer.    Medications:  Current Outpatient Prescriptions   Medication Sig Dispense Refill     HUMALOG KWIKPEN 200 UNIT/ML soln Inject 75 units at breakfast, 72  units with lunch and 68 with dinner plus sliding scale (2/40 over 180 starting at 4 units).  Approx  230 units daily 75 mL 11     Elastic Bandages & Supports (JOBST KNEE HIGH COMPRESSION SM) MISC JOBST 20-30MMHG COMPRESSION SM MISC   To both legs during waking hours daily for leg swelling and venous stasis dermatitis. Do not sleep in stockings. 2 each 3     olopatadine HCl (PATADAY) 0.2 % SOLN Place 1 drop into both eyes daily as needed 1 Bottle 11     insulin glargine (LANTUS SOLOSTAR) 100 UNIT/ML PEN Take 80 units in the morning and 90 units in the evening. 60 mL 11     insulin pen needle (B-D U/F) 31G X 5 MM Use 5 time(s) per day.  Please dispense as BD Pen Needle Mini U/F 31G x 5  each 11     Pregabalin (LYRICA PO)        pimecrolimus (ELIDEL) 1 % cream Apply topically 2 times daily To areas of rash at ears as needed until clear. 60 g 11     triamcinolone (KENALOG) 0.1 % ointment Apply topically daily To legs under compression stockings for stasis dermatitis discoloration. 60 g 5     Podiatric Products (AMLACTIN FOOT CREAM THERAPY EX) Externally apply 12 % topically       artificial tears OINT ophthalmic ointment 0.25 inches At Bedtime       calcium-vitamin D 500-125 MG-UNIT TABS Take 1 tablet by mouth 2 times daily       UNABLE TO FIND MEDICATION NAME: milk of magnesia       minocycline (MINOCIN,DYNACIN) 100 MG capsule Take 1 capsule (100 mg) by mouth 2 times daily 28 capsule 5     ketoconazole (NIZORAL) 2 % shampoo To entire wet scalp and ears and then wash off after 5 minutes three times a week. 240 mL 11     clobetasol (TEMOVATE) 0.05 % external solution Apply topically 2 times daily To itchy and scaly areas of scalp as needed. 60 mL 11     ketoconazole (NIZORAL) 2 % cream Apply topically 2 times daily To affected right ear mixed with triamcinolone ointment until return to clinic. 60 g 5     triamcinolone (KENALOG) 0.1 % ointment Apply topically 2 times daily To rash on the right ear mixed with  ketoconazole cream until return to clinic. 60 g 5     gabapentin (NEURONTIN) 600 MG tablet Take 600 mg in AM , 600 mg afternoon and 900 mg at HS. 90 tablet 1     DULoxetine (CYMBALTA) 30 MG capsule Take 1 capsule (30 mg) by mouth daily 30 capsule 4     clotrimazole (LOTRIMIN) 1 % cream To toenails twice daily. 60 g 6     dorzolamide-timolol (COSOPT) 2-0.5 % ophthalmic solution Place 1 drop into both eyes 2 times daily       ranitidine (ZANTAC) 150 MG tablet Take 150 mg by mouth 2 times daily       mometasone (ELOCON) 0.1 % cream Apply sparingly to left medial ankle area daily.  Do not apply to face. 45 g 0     carboxymethylcellulose (REFRESH PLUS) 0.5 % SOLN 1 drop 3 times daily as needed.       omeprazole (PRILOSEC) 20 MG capsule Take  by mouth daily. Take one tab daily       sodium chloride (SODIUM CHLORIDE) 0.65 % nasal spray Spray 1 spray in nostril daily as needed.       lisinopril (PRINIVIL,ZESTRIL) 5 MG tablet Take 2 tablets by mouth daily. Take one tab daily/Hold for SBP < 110 90 tablet 3     latanoprost (XALATAN) 0.005 % ophthalmic solution 1 drop At Bedtime. Left eye       allopurinol (ZYLOPRIM) 100 MG tablet Take 2 tablets by mouth daily. 180 tablet 1     hydrocortisone 1 % cream Apply  topically 2 times daily.       hydrocortisone 2.5 % cream Apply  topically 2 times daily.       liraglutide (VICTOZA) 18 MG/3ML SOLN Inject 1.8 mg Subcutaneous daily. Start with 0.6 mg x 5 days, then increase to 1.2 mg x 5 days, then 1.8 mg thereafter.  Do not advance to higher dose if you have nausea. 3 Month 3     ibuprofen (ADVIL,MOTRIN) 400 MG tablet Take 400 mg by mouth every 6 hours as needed.       chlorthalidone (HYGROTEN) 12.5 MG TABS Take 12.5 mg by mouth daily.       loratadine (CLARITIN) 10 MG tablet Take 10 mg by mouth as needed.       Hypromellose (ARTIFICIAL TEARS OP) Apply  to eye as needed.       Calcium Citrate-Vitamin D (CALCIUM CITRATE + D PO) 600/400 mg/unit take 1 tabs daily twice daily.         nitroGLYCERIN (NITROSTAT) 0.4 MG SL tablet Place 0.4 mg under the tongue every 5 minutes as needed.       acetaminophen (TYLENOL) 500 MG tablet Take 1-2 tablets by mouth every 6 hours as needed. Tylenol Extra Strength.        Magnesium Hydroxide (MILK OF MAGNESIA PO) Take 30 cc as needed PRN constipation.       atorvastatin (LIPITOR) 80 MG tablet Take 1 tablet by mouth daily. Pt needs to have labs done prior to further refills. 90 tablet 0     folic acid (FOLVITE) 1 MG tablet Take 1 mg by mouth daily.       metoprolol (LOPRESSOR) 50 MG tablet Take 50 mg by mouth 2 times daily.       aspirin 325 MG tablet Take 325 mg by mouth daily.       clopidogrel (PLAVIX) 75 MG tablet Take 75 mg by mouth daily.       Allergies   Allergen Reactions     Dust Mites Other (See Comments)     Sneezing runny eyes and nose.     Food Allergy Formula Hives     Mountain Dew and Walnuts     Pollen Extract Other (See Comments)     Sneezing runny eyes and nose.     Review of Systems:  -As per HPI  -Constitutional: The patient denies fatigue, fevers, chills, unintended weight loss, and night sweats.  -HEENT: Patient denies nonhealing oral sores.  -Skin: As above in HPI. No additional skin concerns.    Physical exam:  Vitals: There were no vitals taken for this visit.  GEN: This is a well developed, well-nourished female in no acute distress, in a pleasant mood.    SKIN: Focused examination of the face, scalp, and bilateral lower extremities was performed.  -minimal white scale present along frontal scalp  -Diffuse white scale and lichenification present on R ear, predominantly on the lobule and helix of the ear  -mild accentuation of the skin lines present on bilateral dorsal hands  -dark brown sclerotic plaques with a rim of hyperpigmentation present on medial aspect of bilateral lower extremities  -No other lesions of concern on areas examined.     Impression/Plan:  1. Seborrheic dermatitis: minimal white scale present on scalp at visit  today, but white scale and lichenification present on R external ear    For scalp seborrheic dermatitis: Continue ketoconazole 2% shampoo 2x per week, and clobetasol 0.05% solution up to BID as needed for scalp pruritus    For seborrheic dermatitis on R ear: apply a thin layer of TAC 0.1% ointment to R ear BID until resolves     2. Hand dermatitis: mild accentuation of the skin lines present on bilateral dorsal hands    Recommended Ms. Preciado apply a moisturizer to her hands at least once per day.  She currently is using Vaseline as a moisturizer for her legs. Informed her that she can also use the Vaseline as a hand moisturizer as well    3. Venous stasis dermatitis: no open erosions or ulcerations present on examination today. Sclerotic plaques are present on the bilateral medial lower extremities from hemosiderin deposition and concomitant post-inflammatory hyperpigmentation.     Continue daily moisturization and use of compression stalkings daily    Follow-up in 6 months, earlier for new or changing lesions.     Staff Involved:  Scribed by Jodi Be, MS4 for Dr. Rubio.      The medical student acted as a scribe with respect to this patient.  I have performed all the key elements of the history and physical.    Rex Rubio MD  Dermatology Attending

## 2017-08-30 PROBLEM — L30.9 CHRONIC DERMATITIS OF HANDS: Status: ACTIVE | Noted: 2017-08-30

## 2017-09-06 ENCOUNTER — AMBULATORY - HEALTHEAST (OUTPATIENT)
Dept: GERIATRICS | Facility: CLINIC | Age: 67
End: 2017-09-06

## 2017-09-19 ENCOUNTER — OFFICE VISIT - HEALTHEAST (OUTPATIENT)
Dept: GERIATRICS | Facility: CLINIC | Age: 67
End: 2017-09-19

## 2017-09-19 DIAGNOSIS — G62.9 NEUROPATHY: ICD-10-CM

## 2017-09-19 DIAGNOSIS — I10 ESSENTIAL HYPERTENSION: ICD-10-CM

## 2017-09-19 DIAGNOSIS — J02.9 SORE THROAT: ICD-10-CM

## 2017-09-28 ENCOUNTER — OFFICE VISIT (OUTPATIENT)
Dept: ENDOCRINOLOGY | Facility: CLINIC | Age: 67
End: 2017-09-28

## 2017-09-28 DIAGNOSIS — M76.72 PERONEAL TENDINITIS, LEFT: ICD-10-CM

## 2017-09-28 DIAGNOSIS — E11.40 DIABETIC NEUROPATHY WITH NEUROLOGIC COMPLICATION (H): ICD-10-CM

## 2017-09-28 DIAGNOSIS — L60.2 ONYCHAUXIS: ICD-10-CM

## 2017-09-28 DIAGNOSIS — B35.1 DERMATOPHYTOSIS OF NAIL: Primary | ICD-10-CM

## 2017-09-28 DIAGNOSIS — E11.49 DIABETIC NEUROPATHY WITH NEUROLOGIC COMPLICATION (H): ICD-10-CM

## 2017-09-28 NOTE — MR AVS SNAPSHOT
After Visit Summary   9/28/2017    Cristal Preciado    MRN: [id number]           Patient Information     Date Of Birth          1950        Visit Information        Provider Department      9/28/2017 1:00 PM Vu Grant DPM [facility]        Today's Diagnoses     Dermatophytosis of nail    -  1    Diabetic neuropathy with neurologic complication (H)        Onychauxis        Peroneal tendinitis, left           Follow-ups after your visit        Your next 10 appointments already scheduled     Dec 01, 2017  8:30 AM CST   RETURN GLAUCOMA with Bette Delong MD   Eye Clinic ([facility])    [address]  [address]  [address]  [facility] [id number] [phone]            Dec 14, 2017  1:15 PM CST   (Arrive by 1:00 PM)   Return Visit with [facility] ([facility])    [address]  [address] [facility] [id number] [phone]              Who to contact     Please call your clinic at [phone] to:    Ask questions about your health    Make or cancel appointments    Discuss your medicines    Learn about your test results    Speak to your doctor   If you have compliments or concerns about an experience at your clinic, or if you wish to file a complaint, please contact [facility] Patient Relations at [phone] or email us at [email or web address]         Additional Information About Your Visit        Care EveryWhere ID     This is your Care EveryWhere ID. This could be used by other organizations to access your [place] medical records  [id number]         Blood Pressure from Last 3 Encounters:   06/05/17 124/51   02/27/17 138/84   10/24/16 119/77    Weight from Last 3 Encounters:   07/20/16 106.5 kg (234 lb 12.8 oz)   03/02/15 105.7 kg (233 lb)   03/06/14 111.6 kg (246 lb)              We Performed the Following     DEBRIDEMENT OF  NAIL(S), 1-5        Primary Care Provider Office Phone # Fax #    Meagan Valdez [phone]       [facility] AFTER HOURS [address] [place] [address]        Equal Access to Services     PASCALE JANSEN : Hadselina hilario ku hadlópezo Soomaali, waaxda luqadaha, qaybta kaalmada adeegyada, camilla solisn nadira barrios laMonicaturner ding. So [facility] [phone].    ATENCIÓN: Si habla español, tiene a reyes disposición servicios gratuitos de asistencia lingüística. Llame al [phone].    We comply with applicable federal civil rights laws and [place] laws. We do not discriminate on the basis of race, color, national origin, age, disability sex, sexual orientation or gender identity.            Thank you!     Thank you for choosing [facility]  for your care. Our goal is always to provide you with excellent care. Hearing back from our patients is one way we can continue to improve our services. Please take a few minutes to complete the written survey that you may receive in the mail after your visit with us. Thank you!             Your Updated Medication List - Protect others around you: Learn how to safely use, store and throw away your medicines at www.disposemymeds.org.          This list is accurate as of: 9/28/17  1:32 PM.  Always use your most recent med list.                   Brand Name Dispense Instructions for use Diagnosis    acetaminophen 500 MG tablet    TYLENOL     Take 1-2 tablets by mouth every 6 hours as needed. Tylenol Extra Strength.        allopurinol 100 MG tablet    ZYLOPRIM    180 tablet    Take 2 tablets by mouth daily.    Hyperuricemia       AMLACTIN FOOT CREAM THERAPY EX      Externally apply 12 % topically        artificial tears Oint ophthalmic ointment      0.25 inches At Bedtime        ARTIFICIAL TEARS OP      Apply  to eye as needed.        aspirin 325 MG tablet      Take 325 mg by mouth daily.        atorvastatin 80 MG tablet    LIPITOR    90 tablet    Take 1 tablet  by mouth daily. Pt needs to have labs done prior to further refills.    Other and unspecified hyperlipidemia       CALCIUM CITRATE + D PO      600/400 mg/unit take 1 tabs daily twice daily.        calcium-vitamin D 500-125 MG-UNIT Tabs      Take 1 tablet by mouth 2 times daily        carboxymethylcellulose 0.5 % Soln ophthalmic solution    REFRESH PLUS     1 drop 3 times daily as needed.        chlorthalidone 12.5 mg Tabs half-tab    HYGROTON     Take 12.5 mg by mouth daily.        CLARITIN 10 MG tablet   Generic drug:  loratadine      Take 10 mg by mouth as needed.        clobetasol 0.05 % external solution    TEMOVATE    60 mL    Apply topically 2 times daily To itchy and scaly areas of scalp as needed.    Dermatitis, seborrheic       clotrimazole 1 % cream    LOTRIMIN    60 g    To toenails twice daily.    Dermatophytosis of nail, DM neuro manif type II       dorzolamide-timolol 2-0.5 % ophthalmic solution    COSOPT     Place 1 drop into both eyes 2 times daily        DULoxetine 30 MG EC capsule    CYMBALTA    30 capsule    Take 1 capsule (30 mg) by mouth daily    Depression       folic acid 1 MG tablet    FOLVITE     Take 1 mg by mouth daily.        gabapentin 600 MG tablet    NEURONTIN    90 tablet    Take 600 mg in AM , 600 mg afternoon and 900 mg at HS.    Diabetic neuropathy (H)       HUMALOG KWIKPEN 200 UNIT/ML soln   Generic drug:  Insulin Lispro     75 mL    Inject 75 units at breakfast, 72 units with lunch and 68 with dinner plus sliding scale (2/40 over 180 starting at 4 units).  Approx  230 units daily    Type 2 diabetes mellitus, controlled (H)       * hydrocortisone 1 % cream    CORTAID     Apply  topically 2 times daily.        * hydrocortisone 2.5 % cream      Apply  topically 2 times daily.        ibuprofen 400 MG tablet    ADVIL/MOTRIN     Take 400 mg by mouth every 6 hours as needed.        insulin glargine 100 UNIT/ML injection    LANTUS SOLOSTAR    60 mL    Take 80 units in the morning and 90  units in the evening.    Type 2 diabetes mellitus, controlled (H)       insulin pen needle 31G X 5 MM    B-D U/F    150 each    Use 5 time(s) per day.  Please dispense as BD Pen Needle Mini U/F 31G x 5 MM    Type 2 diabetes mellitus, controlled (H)       JOBST KNEE HIGH COMPRESSION SM Misc     2 each    JOBST 20-30MMHG COMPRESSION SM MISC  To both legs during waking hours daily for leg swelling and venous stasis dermatitis. Do not sleep in stockings.    Venous stasis dermatitis of both lower extremities       * ketoconazole 2 % shampoo    NIZORAL    240 mL    To entire wet scalp and ears and then wash off after 5 minutes three times a week.    Dermatitis, seborrheic       * ketoconazole 2 % cream    NIZORAL    60 g    Apply topically 2 times daily To affected right ear mixed with triamcinolone ointment until return to clinic.    Dermatitis       liraglutide 18 MG/3ML Soln    VICTOZA    3 Month    Inject 1.8 mg Subcutaneous daily. Start with 0.6 mg x 5 days, then increase to 1.2 mg x 5 days, then 1.8 mg thereafter.  Do not advance to higher dose if you have nausea.    Diabetes mellitus, type 2 (H)       lisinopril 5 MG tablet    PRINIVIL/ZESTRIL    90 tablet    Take 2 tablets by mouth daily. Take one tab daily/Hold for SBP < 110    Essential hypertension, benign       LYRICA PO           metoprolol 50 MG tablet    LOPRESSOR     Take 50 mg by mouth 2 times daily.        MILK OF MAGNESIA PO      Take 30 cc as needed PRN constipation.        minocycline 100 MG capsule    MINOCIN/DYNACIN    28 capsule    Take 1 capsule (100 mg) by mouth 2 times daily    Dermatitis, seborrheic       mometasone 0.1 % cream    ELOCON    45 g    Apply sparingly to left medial ankle area daily.  Do not apply to face.        nitroGLYcerin 0.4 MG sublingual tablet    NITROSTAT     Place 0.4 mg under the tongue every 5 minutes as needed.        olopatadine HCl 0.2 % Soln    PATADAY    1 Bottle    Place 1 drop into both eyes daily as needed     History of itching of eye       omeprazole 20 MG CR capsule    priLOSEC     Take  by mouth daily. Take one tab daily        pimecrolimus 1 % cream    ELIDEL    60 g    Apply topically 2 times daily To areas of rash at ears as needed until clear.    Dermatitis, seborrheic       PLAVIX 75 MG tablet   Generic drug:  clopidogrel      Take 75 mg by mouth daily.        ranitidine 150 MG tablet    ZANTAC     Take 150 mg by mouth 2 times daily        sodium chloride 0.65 % nasal spray    OCEAN     Spray 1 spray in nostril daily as needed.        * triamcinolone 0.1 % ointment    KENALOG    60 g    Apply topically 2 times daily To rash on the right ear mixed with ketoconazole cream until return to clinic.    Dermatitis       * triamcinolone 0.1 % ointment    KENALOG    60 g    Apply topically daily To legs under compression stockings for stasis dermatitis discoloration.    Venous stasis dermatitis of both lower extremities       * triamcinolone 0.1 % ointment    KENALOG    80 g    Twice a day to area of rash and itch on right ear    Dermatitis, seborrheic       UNABLE TO FIND      MEDICATION NAME: milk of magnesia        XALATAN 0.005 % ophthalmic solution   Generic drug:  latanoprost      1 drop At Bedtime. Left eye        * Notice:  This list has 7 medication(s) that are the same as other medications prescribed for you. Read the directions carefully, and ask your doctor or other care provider to review them with you.

## 2017-09-28 NOTE — PROGRESS NOTES
Past Medical History:   Diagnosis Date     AION (anterior ischaemic optic neuropathy), left eye     NAION LE     CAD (coronary artery disease) 3/2009    [facility]; Angio 2013 UM- normal coronary arteries     Cataract      CVA (cerebral vascular accident) (H)     admitted at [facility]     on Cymbalta     Diabetes mellitus, type 2 (H)      Diabetic nephropathy (H)      Diabetic neuropathy (H)     severe     Diabetic retinopathy (H)      GERD (gastroesophageal reflux disease)      Hyperlipidemia      Hypertension     ECHO 2013, TDS, NL EF     POAG (primary open-angle glaucoma)     adv BE     Seasonal allergies      Tubular adenoma of colon 2013    repeat colonoscopy in 2018     Patient Active Problem List   Diagnosis     Cataract     CAD (coronary artery disease)     Diabetes mellitus, type 2 (H)     Diabetic nephropathy (H)     Diabetic neuropathy (H)     Diabetic retinopathy (H)     GERD (gastroesophageal reflux disease)     Glaucoma     Hypertension     Hyperlipidemia     CVA (cerebral vascular accident) (H)     Morbid obesity (H)     Anemia     Seasonal allergies     Depression     Tubular adenoma of colon     Leg weakness     Dermatitis, seborrheic     Eczematous dermatitis     History of coronary artery disease     Venous stasis dermatitis of both lower extremities     Uncontrolled type 2 diabetes mellitus (H)     Chronic dermatitis of hands     Past Surgical History:   Procedure Laterality Date     COLONOSCOPY  7/15/2013    Tubular adenoma; repeat in 2018;Procedure: COMBINED COLONOSCOPY, SINGLE BIOPSY/POLYPECTOMY BY BIOPSY;;  Surgeon: Don King MD;  Tubular adenoma     EXTRACAPSULAR CATARACT EXTRATION WITH INTRAOCULAR LENS IMPLANT  11-10-09, 2-9-10    11-10-09 Lt, 2-9-10 Rt; Left eye 8/2012     Social History     Social History     Marital status: Single     Spouse name: N/A     Number of children: N/A     Years of education: N/A     Occupational History     Not on file.      Social History Main Topics     Smoking status: Former Smoker     Quit date: 3/6/2009     Smokeless tobacco: Never Used     Alcohol use No     Drug use: No     Sexual activity: No     Other Topics Concern     Not on file     Social History Narrative    Pt has three daughters and two sons, single.  Her daughter Court, see's her often at the Nursing Home ([facility]).  Moved into Nursing Home in October 2011 after a hospitalization for ALY.  Moved from [place] in 2002 to [facility]. Has 5 grandchildren.     Family History   Problem Relation Age of Onset     Hypertension Mother      CEREBROVASCULAR DISEASE Mother      Glaucoma Father      CANCER Father      DIABETES Sister      Glaucoma Sister      Cancer - colorectal Other      CEREBROVASCULAR DISEASE Other      Skin Cancer No family hx of      Melanoma No family hx of      Lab Results   Component Value Date    A1C 7.5 10/24/2016    A1c                              7.3                           6/05/2017  Last Basic Metabolic Panel:  Lab Results   Component Value Date     10/24/2016      Lab Results   Component Value Date    POTASSIUM 4.7 10/24/2016     Lab Results   Component Value Date    CHLORIDE 107 10/24/2016     Lab Results   Component Value Date    OLAF 9.6 10/24/2016     Lab Results   Component Value Date    CO2 26 10/24/2016     Lab Results   Component Value Date    BUN 16 10/24/2016     Lab Results   Component Value Date    CR 0.99 10/24/2016     Lab Results   Component Value Date     10/24/2016     SUBJECTIVE:  A 67-year-old female returns to clinic for onychomycosis.  She is diabetic with peripheral neuropathy and vascular disease.  She relates that she is in her wheelchair most of the time.  She has diabetic shoes.  She is not wearing them today.  She relates she is getting some posterolateral left ankle pain.  She relates no injuries.  No specific relieving or aggravating factors.      OBJECTIVE:  DP and PT are 1/4 bilaterally.   She has decreased hair growth bilaterally.  She has some peripheral edema bilaterally.  She has incurvated nails with some thickening, dystrophy, discoloration and subungual debris 1-5 bilaterally to differing degrees.  There is no erythema, no drainage, no odor, no calor bilaterally.  She has pain on palpation along the peroneal tendon just posterior to the ankle between the lateral malleolus and the Achilles tendon.  There are no gross tendon voids bilaterally.  She sits in her wheelchair with footrests.  She has got her foot inverted and the area of pain is right where the tension is.      ASSESSMENT AND PLAN:  Onychomycosis bilaterally,  Onychauxis bilaterally.  She is diabetic with peripheral neuropathy and vascular disease.  She is getting some peroneal tendon pain on the left.  This is likely secondary sitting position on a footrest plateau.  I lowered this and she related that feels a little better and will keep it lowered and have the nursing home check it to make sure this helps with this.  Diagnosis and treatment options discussed with her.  All the nails were debrided or reduced bilaterally upon consent.  She will return to clinic and see me in about 3 months.

## 2017-09-28 NOTE — LETTER
9/28/2017       RE: Cristal Preciado  [facility]  [address]   [place] [address]     Dear Colleague,    Thank you for referring your patient, Cristal Preciado, to the [facility] ENDOCRINOLOGY at [facility]. Please see a copy of my visit note below.    Past Medical History:   Diagnosis Date     AION (anterior ischaemic optic neuropathy), left eye     NAION LE     CAD (coronary artery disease) 3/2009    [facility]; Angio 2013 [facility]- normal coronary arteries     Cataract      CVA (cerebral vascular accident) (H)     admitted at [facility]     Depression     on Cymbalta     Diabetes mellitus, type 2 (H)      Diabetic nephropathy (H)      Diabetic neuropathy (H)     severe     Diabetic retinopathy (H)      GERD (gastroesophageal reflux disease)      Hyperlipidemia      Hypertension     ECHO 2013, TDS, NL EF     POAG (primary open-angle glaucoma)     adv BE     Seasonal allergies      Tubular adenoma of colon 2013    repeat colonoscopy in 2018     Patient Active Problem List   Diagnosis     Cataract     CAD (coronary artery disease)     Diabetes mellitus, type 2 (H)     Diabetic nephropathy (H)     Diabetic neuropathy (H)     Diabetic retinopathy (H)     GERD (gastroesophageal reflux disease)     Glaucoma     Hypertension     Hyperlipidemia     CVA (cerebral vascular accident) (H)     Morbid obesity (H)     Anemia     Seasonal allergies     Depression     Tubular adenoma of colon     Leg weakness     Dermatitis, seborrheic     Eczematous dermatitis     History of coronary artery disease     Venous stasis dermatitis of both lower extremities     Uncontrolled type 2 diabetes mellitus (H)     Chronic dermatitis of hands     Past Surgical History:   Procedure Laterality Date     COLONOSCOPY  7/15/2013    Tubular adenoma; repeat in 2018;Procedure: COMBINED COLONOSCOPY, SINGLE BIOPSY/POLYPECTOMY BY BIOPSY;;  Surgeon: Don King MD;  Tubular adenoma      EXTRACAPSULAR CATARACT EXTRATION WITH INTRAOCULAR LENS IMPLANT  11-10-09, 2-9-10    11-10-09 Lt, 2-9-10 Rt; Left eye 8/2012     Social History     Social History     Marital status: Single     Spouse name: N/A     Number of children: N/A     Years of education: N/A     Occupational History     Not on file.     Social History Main Topics     Smoking status: Former Smoker     Quit date: 3/6/2009     Smokeless tobacco: Never Used     Alcohol use No     Drug use: No     Sexual activity: No     Other Topics Concern     Not on file     Social History Narrative    Pt has three daughters and two sons, single.  Her daughter Court, see's her often at the Nursing Home ([facility]).  Moved into Nursing Home in October 2011 after a hospitalization for ALY.  Moved from [place] in 2002 to [facility]. Has 5 grandchildren.     Family History   Problem Relation Age of Onset     Hypertension Mother      CEREBROVASCULAR DISEASE Mother      Glaucoma Father      CANCER Father      DIABETES Sister      Glaucoma Sister      Cancer - colorectal Other      CEREBROVASCULAR DISEASE Other      Skin Cancer No family hx of      Melanoma No family hx of      Lab Results   Component Value Date    A1C 7.5 10/24/2016    A1c                              7.3                           6/05/2017  Last Basic Metabolic Panel:  Lab Results   Component Value Date     10/24/2016      Lab Results   Component Value Date    POTASSIUM 4.7 10/24/2016     Lab Results   Component Value Date    CHLORIDE 107 10/24/2016     Lab Results   Component Value Date    OLAF 9.6 10/24/2016     Lab Results   Component Value Date    CO2 26 10/24/2016     Lab Results   Component Value Date    BUN 16 10/24/2016     Lab Results   Component Value Date    CR 0.99 10/24/2016     Lab Results   Component Value Date     10/24/2016     SUBJECTIVE:  A 67-year-old female returns to clinic for onychomycosis.  She is diabetic with peripheral neuropathy and vascular  disease.  She relates that she is in her wheelchair most of the time.  She has diabetic shoes.  She is not wearing them today.  She relates she is getting some posterolateral left ankle pain.  She relates no injuries.  No specific relieving or aggravating factors.      OBJECTIVE:  DP and PT are 1/4 bilaterally.  She has decreased hair growth bilaterally.  She has some peripheral edema bilaterally.  She has incurvated nails with some thickening, dystrophy, discoloration and subungual debris 1-5 bilaterally to differing degrees.  There is no erythema, no drainage, no odor, no calor bilaterally.  She has pain on palpation along the peroneal tendon just posterior to the ankle between the lateral malleolus and the Achilles tendon.  There are no gross tendon voids bilaterally.  She sits in her wheelchair with footrests.  She has got her foot inverted and the area of pain is right where the tension is.      ASSESSMENT AND PLAN:  Onychomycosis bilaterally,  Onychauxis bilaterally.  She is diabetic with peripheral neuropathy and vascular disease.  She is getting some peroneal tendon pain on the left.  This is likely secondary sitting position on a footrest plateau.  I lowered this and she related that feels a little better and will keep it lowered and have the nursing home check it to make sure this helps with this.  Diagnosis and treatment options discussed with her.  All the nails were debrided or reduced bilaterally upon consent.  She will return to clinic and see me in about 3 months.       Again, thank you for allowing me to participate in the care of your patient.      Sincerely,    Vu Grant DPM

## 2017-09-29 ENCOUNTER — OFFICE VISIT - HEALTHEAST (OUTPATIENT)
Dept: GERIATRICS | Facility: CLINIC | Age: 67
End: 2017-09-29

## 2017-09-29 DIAGNOSIS — E11.42 DIABETIC PERIPHERAL NEUROPATHY ASSOCIATED WITH TYPE 2 DIABETES MELLITUS (H): ICD-10-CM

## 2017-09-29 DIAGNOSIS — F32.A DEPRESSION, UNSPECIFIED DEPRESSION TYPE: ICD-10-CM

## 2017-09-29 DIAGNOSIS — R60.0 BILATERAL LOWER EXTREMITY EDEMA: ICD-10-CM

## 2017-09-29 DIAGNOSIS — E66.01 MORBID OBESITY WITH BMI OF 40.0-44.9, ADULT (H): ICD-10-CM

## 2017-09-29 DIAGNOSIS — N18.2 STAGE 2 CHRONIC KIDNEY DISEASE: ICD-10-CM

## 2017-09-29 DIAGNOSIS — E11.9 TYPE 2 DIABETES MELLITUS (H): ICD-10-CM

## 2017-10-01 ASSESSMENT — MIFFLIN-ST. JEOR: SCORE: 1521.53

## 2017-11-06 ENCOUNTER — OFFICE VISIT - HEALTHEAST (OUTPATIENT)
Dept: GERIATRICS | Facility: CLINIC | Age: 67
End: 2017-11-06

## 2017-11-06 DIAGNOSIS — N18.2 STAGE 2 CHRONIC KIDNEY DISEASE: ICD-10-CM

## 2017-11-06 DIAGNOSIS — F32.A DEPRESSION, UNSPECIFIED DEPRESSION TYPE: ICD-10-CM

## 2017-11-06 DIAGNOSIS — E11.42 DIABETIC PERIPHERAL NEUROPATHY ASSOCIATED WITH TYPE 2 DIABETES MELLITUS (H): ICD-10-CM

## 2017-11-06 DIAGNOSIS — E11.9 TYPE 2 DIABETES MELLITUS (H): ICD-10-CM

## 2017-11-07 ENCOUNTER — RECORDS - HEALTHEAST (OUTPATIENT)
Dept: LAB | Facility: CLINIC | Age: 67
End: 2017-11-07

## 2017-11-07 LAB — HBA1C MFR BLD: 7.9 % (ref 4.2–6.1)

## 2017-12-14 ENCOUNTER — TELEPHONE (OUTPATIENT)
Dept: OPHTHALMOLOGY | Facility: CLINIC | Age: 67
End: 2017-12-14

## 2017-12-14 ENCOUNTER — OFFICE VISIT (OUTPATIENT)
Dept: ENDOCRINOLOGY | Facility: CLINIC | Age: 67
End: 2017-12-14
Payer: COMMERCIAL

## 2017-12-14 DIAGNOSIS — E11.49 TYPE II OR UNSPECIFIED TYPE DIABETES MELLITUS WITH NEUROLOGICAL MANIFESTATIONS, NOT STATED AS UNCONTROLLED(250.60) (H): ICD-10-CM

## 2017-12-14 DIAGNOSIS — E11.51 DIABETES MELLITUS WITH PERIPHERAL VASCULAR DISEASE (H): ICD-10-CM

## 2017-12-14 DIAGNOSIS — M21.6X9 EQUINUS DEFORMITY OF FOOT: ICD-10-CM

## 2017-12-14 DIAGNOSIS — B35.1 DERMATOPHYTOSIS OF NAIL: Primary | ICD-10-CM

## 2017-12-14 NOTE — MR AVS SNAPSHOT
After Visit Summary   12/14/2017    Cristal Preciado    MRN: [id number]           Patient Information     Date Of Birth          1950        Visit Information        Provider Department      12/14/2017 1:15 PM Vu Grant DPM [facility]         Follow-ups after your visit        Your next 10 appointments already scheduled     Feb 20, 2018 10:45 AM CST   (Arrive by 10:30 AM)   Return Visit with Rex Rubio MD   [facility] Dermat[facility] ([facility] [place])    [address]  [address] [facility] [place] [id number] [phone]            Mar 22, 2018  1:30 PM CDT   (Arrive by 1:15 PM)   Return Visit with MIRTHA Chavez [facility] ([facility])    [address]  [address] [facility] [place] [id number] [phone]            Mar 28, 2018 11:30 AM CDT   VISUAL FIELD with [facility] EYE VISUAL FIELD   Eye Clinic ([facility])    [address]  [address]  [address]  [facility] [place] [address] [phone]            Mar 28, 2018  1:00 PM CDT   RETURN GLAUCOMA with Bette Delong MD   Eye Clinic ([facility])    [address]  [address]  [address]  [facility] [place] [address] [phone]              Who to contact     Please call your clinic at [phone] to:    Ask questions about your health    Make or cancel appointments    Discuss your medicines    Learn about your test results    Speak to your doctor   If you have compliments or concerns about an experience at your clinic, or if you wish to file a complaint, please contact [facility] Patient Relations at [phone] or email us at [email or web address]         Additional Information About Your Visit        Bazaarvoicehart Information     [facility] is an electronic gateway that provides easy, online access to your medical records. With [facility], you can request a clinic  appointment, read your test results, renew a prescription or communicate with your care team.     To sign up for [email or web address] visit the website at [email or web address]   You will be asked to enter the access code listed below, as well as some personal information. Please follow the directions to create your username and password.     Your access code is: [email or web address]  Expires: 2/15/2018  6:31 AM     Your access code will  in 90 days. If you need help or a new code, please contact your [facility] or call [phone] for assistance.        Care EveryWhere ID     This is your Care EveryWhere ID. This could be used by other organizations to access your [facility] medical records  [id number]         Blood Pressure from Last 3 Encounters:   17 124/51   17 138/84   10/24/16 119/77    Weight from Last 3 Encounters:   16 106.5 kg (234 lb 12.8 oz)   03/02/15 105.7 kg (233 lb)   14 111.6 kg (246 lb)              Today, you had the following     No orders found for display       Primary Care Provider Office Phone # Fax #    Meagan CORNELIUS José Miguel [phone]       [facility] AFTER HOURS [address] [place] [address]        Equal Access to Services     PASCALE JANSEN AH: Hadii aad ku hadasho Soomaali, waaxda luqadaha, qaybta kaalmada adeegyada, waxay idiin hayturner amezcua . So [facility] [place] [facility] [phone].    ATENCIÓN: Si habla español, tiene a reyes disposición servicios gratuitos de asistencia lingüística. Llame al [phone].    We comply with applicable federal civil rights laws and [place] laws. We do not discriminate on the basis of race, color, national origin, age, disability, sex, sexual orientation, or gender identity.            Thank you!     Thank you for choosing [facility]  for your care. Our goal is always to provide you with excellent care. Hearing back from our patients is one way we can continue to improve our  services. Please take a few minutes to complete the written survey that you may receive in the mail after your visit with us. Thank you!             Your Updated Medication List - Protect others around you: Learn how to safely use, store and throw away your medicines at www.disposemymeds.org.          This list is accurate as of: 12/14/17  1:21 PM.  Always use your most recent med list.                   Brand Name Dispense Instructions for use Diagnosis    acetaminophen 500 MG tablet    TYLENOL     Take 1-2 tablets by mouth every 6 hours as needed. Tylenol Extra Strength.        allopurinol 100 MG tablet    ZYLOPRIM    180 tablet    Take 2 tablets by mouth daily.    Hyperuricemia       AMLACTIN FOOT CREAM THERAPY EX      Externally apply 12 % topically        artificial tears Oint ophthalmic ointment      0.25 inches At Bedtime        ARTIFICIAL TEARS OP      Apply  to eye as needed.        aspirin 325 MG tablet      Take 325 mg by mouth daily.        atorvastatin 80 MG tablet    LIPITOR    90 tablet    Take 1 tablet by mouth daily. Pt needs to have labs done prior to further refills.    Other and unspecified hyperlipidemia       CALCIUM CITRATE + D PO      600/400 mg/unit take 1 tabs daily twice daily.        calcium-vitamin D 500-125 MG-UNIT Tabs      Take 1 tablet by mouth 2 times daily        carboxymethylcellulose 0.5 % Soln ophthalmic solution    REFRESH PLUS     1 drop 3 times daily as needed.        chlorthalidone 12.5 mg Tabs half-tab    HYGROTON     Take 12.5 mg by mouth daily.        CLARITIN 10 MG tablet   Generic drug:  loratadine      Take 10 mg by mouth as needed.        clobetasol 0.05 % external solution    TEMOVATE    60 mL    Apply topically 2 times daily To itchy and scaly areas of scalp as needed.    Dermatitis, seborrheic       clotrimazole 1 % cream    LOTRIMIN    60 g    To toenails twice daily.    Dermatophytosis of nail, Type II or unspecified type diabetes mellitus with neurological  manifestations, not stated as uncontrolled(250.60) (H)       dorzolamide-timolol 2-0.5 % ophthalmic solution    COSOPT     Place 1 drop into both eyes 2 times daily        DULoxetine 30 MG EC capsule    CYMBALTA    30 capsule    Take 1 capsule (30 mg) by mouth daily    Depression       folic acid 1 MG tablet    FOLVITE     Take 1 mg by mouth daily.        gabapentin 600 MG tablet    NEURONTIN    90 tablet    Take 600 mg in AM , 600 mg afternoon and 900 mg at HS.    Diabetic neuropathy (H)       HUMALOG KWIKPEN 200 UNIT/ML soln   Generic drug:  Insulin Lispro     75 mL    Inject 75 units at breakfast, 72 units with lunch and 68 with dinner plus sliding scale (2/40 over 180 starting at 4 units).  Approx  230 units daily    Type 2 diabetes mellitus, controlled (H)       * hydrocortisone 1 % cream    CORTAID     Apply  topically 2 times daily.        * hydrocortisone 2.5 % cream      Apply  topically 2 times daily.        ibuprofen 400 MG tablet    ADVIL/MOTRIN     Take 400 mg by mouth every 6 hours as needed.        insulin glargine 100 UNIT/ML injection    LANTUS SOLOSTAR    60 mL    Take 80 units in the morning and 90 units in the evening.    Type 2 diabetes mellitus, controlled (H)       insulin pen needle 31G X 5 MM    B-D U/F    150 each    Use 5 time(s) per day.  Please dispense as BD Pen Needle Mini U/F 31G x 5 MM    Type 2 diabetes mellitus, controlled (H)       JOBST KNEE HIGH COMPRESSION SM Misc     2 each    JOBST 20-30MMHG COMPRESSION SM MISC  To both legs during waking hours daily for leg swelling and venous stasis dermatitis. Do not sleep in stockings.    Venous stasis dermatitis of both lower extremities       * ketoconazole 2 % shampoo    NIZORAL    240 mL    To entire wet scalp and ears and then wash off after 5 minutes three times a week.    Dermatitis, seborrheic       * ketoconazole 2 % cream    NIZORAL    60 g    Apply topically 2 times daily To affected right ear mixed with triamcinolone ointment  until return to clinic.    Dermatitis       liraglutide 18 MG/3ML Soln    VICTOZA    3 Month    Inject 1.8 mg Subcutaneous daily. Start with 0.6 mg x 5 days, then increase to 1.2 mg x 5 days, then 1.8 mg thereafter.  Do not advance to higher dose if you have nausea.    Diabetes mellitus, type 2 (H)       lisinopril 5 MG tablet    PRINIVIL/ZESTRIL    90 tablet    Take 2 tablets by mouth daily. Take one tab daily/Hold for SBP < 110    Essential hypertension, benign       LYRICA PO           metoprolol 50 MG tablet    LOPRESSOR     Take 50 mg by mouth 2 times daily.        MILK OF MAGNESIA PO      Take 30 cc as needed PRN constipation.        minocycline 100 MG capsule    MINOCIN/DYNACIN    28 capsule    Take 1 capsule (100 mg) by mouth 2 times daily    Dermatitis, seborrheic       mometasone 0.1 % cream    ELOCON    45 g    Apply sparingly to left medial ankle area daily.  Do not apply to face.        nitroGLYcerin 0.4 MG sublingual tablet    NITROSTAT     Place 0.4 mg under the tongue every 5 minutes as needed.        olopatadine HCl 0.2 % Soln    PATADAY    1 Bottle    Place 1 drop into both eyes daily as needed    History of itching of eye       omeprazole 20 MG CR capsule    priLOSEC     Take  by mouth daily. Take one tab daily        pimecrolimus 1 % cream    ELIDEL    60 g    Apply topically 2 times daily To areas of rash at ears as needed until clear.    Dermatitis, seborrheic       PLAVIX 75 MG tablet   Generic drug:  clopidogrel      Take 75 mg by mouth daily.        ranitidine 150 MG tablet    ZANTAC     Take 150 mg by mouth 2 times daily        sodium chloride 0.65 % nasal spray    OCEAN     Spray 1 spray in nostril daily as needed.        * triamcinolone 0.1 % ointment    KENALOG    60 g    Apply topically 2 times daily To rash on the right ear mixed with ketoconazole cream until return to clinic.    Dermatitis       * triamcinolone 0.1 % ointment    KENALOG    60 g    Apply topically daily To legs under  compression stockings for stasis dermatitis discoloration.    Venous stasis dermatitis of both lower extremities       * triamcinolone 0.1 % ointment    KENALOG    80 g    Twice a day to area of rash and itch on right ear    Dermatitis, seborrheic       UNABLE TO FIND      MEDICATION NAME: milk of magnesia        XALATAN 0.005 % ophthalmic solution   Generic drug:  latanoprost      1 drop At Bedtime. Left eye        * Notice:  This list has 7 medication(s) that are the same as other medications prescribed for you. Read the directions carefully, and ask your doctor or other care provider to review them with you.

## 2017-12-14 NOTE — TELEPHONE ENCOUNTER
Pt stays at [facility] and staff calling about glasses Rx  Reviewed Refraction (glasses Rx) last done here august 2017 and previous has not been check dating to 2014  Reviewed not a lot of change between pt's old glasses and the new RX written (-0.50 sphere difference in right eye)  Reviewed we do not dispense glass lenses/frames.  Reviewed optical shops work with pt's for coverage of new glasses typically.  Reviewed refractions are not always covered and to check with insurance prior to eye visit (reviewed typically do not cover multiple refraction in same year)  Care staff verbally demonstrated understanding  Glen Willard RN 1:56 PM 12/14/17

## 2017-12-14 NOTE — LETTER
12/14/2017       RE: Cristal Preciado  [facility]  [address]   [place] [address]     Dear Colleague,    Thank you for referring your patient, Cristal Preciado, to the [facility] ENDOCRINOLOGY at [facility]. Please see a copy of my visit note below.    Past Medical History:   Diagnosis Date     AION (anterior ischaemic optic neuropathy), left eye     NAION LE     CAD (coronary artery disease) 3/2009    [facility]; Angio 2013 [facility]- normal coronary arteries     Cataract      CVA (cerebral vascular accident) (H)     admitted at [facility]     Depression     on Cymbalta     Diabetes mellitus, type 2 (H)      Diabetic nephropathy (H)      Diabetic neuropathy (H)     severe     Diabetic retinopathy (H)      GERD (gastroesophageal reflux disease)      Hyperlipidemia      Hypertension     ECHO 2013, TDS, NL EF     POAG (primary open-angle glaucoma)     adv BE     Seasonal allergies      Tubular adenoma of colon 2013    repeat colonoscopy in 2018     Patient Active Problem List   Diagnosis     Cataract     CAD (coronary artery disease)     Diabetes mellitus, type 2 (H)     Diabetic nephropathy (H)     Diabetic neuropathy (H)     Diabetic retinopathy (H)     GERD (gastroesophageal reflux disease)     Glaucoma     Hypertension     Hyperlipidemia     CVA (cerebral vascular accident) (H)     Morbid obesity (H)     Anemia     Seasonal allergies     Depression     Tubular adenoma of colon     Leg weakness     Dermatitis, seborrheic     Eczematous dermatitis     History of coronary artery disease     Venous stasis dermatitis of both lower extremities     Uncontrolled type 2 diabetes mellitus (H)     Chronic dermatitis of hands     Past Surgical History:   Procedure Laterality Date     COLONOSCOPY  7/15/2013    Tubular adenoma; repeat in 2018;Procedure: COMBINED COLONOSCOPY, SINGLE BIOPSY/POLYPECTOMY BY BIOPSY;;  Surgeon: Don King MD;  Tubular  adenoma     EXTRACAPSULAR CATARACT EXTRATION WITH INTRAOCULAR LENS IMPLANT  11-10-09, 2-9-10    11-10-09 Lt, 2-9-10 Rt; Left eye 8/2012     Social History     Social History     Marital status: Single     Spouse name: N/A     Number of children: N/A     Years of education: N/A     Occupational History     Not on file.     Social History Main Topics     Smoking status: Former Smoker     Quit date: 3/6/2009     Smokeless tobacco: Never Used     Alcohol use No     Drug use: No     Sexual activity: No     Other Topics Concern     Not on file     Social History Narrative    Pt has three daughters and two sons, single.  Her daughter Court, see's her often at the Nursing Home ([facility]).  Moved into Nursing Home in October 2011 after a hospitalization for ALY.  Moved from [place] in 2002 to [facility]. Has 5 grandchildren.     Family History   Problem Relation Age of Onset     Hypertension Mother      CEREBROVASCULAR DISEASE Mother      Glaucoma Father      CANCER Father      DIABETES Sister      Glaucoma Sister      Cancer - colorectal Other      CEREBROVASCULAR DISEASE Other      Skin Cancer No family hx of      Melanoma No family hx of      Lab Results   Component Value Date    A1C 7.5 10/24/2016    A1c                               7.3                           6/05/2017  SUBJECTIVE FINDINGS: A 67-year-old female returns to clinic for diabetic foot care and toenail care.  She relates she needs diabetic shoes.  No ulcers or sores since we have seen her last.  Presents in her wheelchair.  She relates her left first and second toes have been hurting on the end.  She keep bumping the toenails.        OBJECTIVE FINDINGS: DP and PT are 1/4 bilaterally.  She has decreased hair growth bilaterally.  She has minimal peripheral edema bilaterally.  She relates she is wearing her compression socks.  She has incurvated, thickened, brittle nails with subungual debris, dystrophy and discoloration 1, 2 and 5 bilaterally and  to a lesser degree 3 and 4 bilaterally.  There is no erythema, no drainage, no odor, no calor bilaterally.  She has decreased ankle joint dorsiflexion bilaterally, decreased hair growth bilaterally.      ASSESSMENT AND PLAN: Onychomycosis bilaterally, onychauxis bilaterally.  She is diabetic with peripheral neuropathy and vascular disease.  She also has an equinus present.  Diagnosis and treatment options discussed with her.  All the nails were debrided or reduced bilaterally upon consent.  Six nails were debrided.  Prescription for new diabetic shoes and inserts given and use discussed with her.  The left hallux nail border bled a bit upon debridement.  Local wound care done and use discussed with her.  She will return to clinic and see me in 3 months.     Again, thank you for allowing me to participate in the care of your patient.      Sincerely,    Vu Grant DPM

## 2017-12-14 NOTE — PROGRESS NOTES
Past Medical History:   Diagnosis Date     AION (anterior ischaemic optic neuropathy), left eye     NAION LE     CAD (coronary artery disease) 3/2009    [facility]; Angio 2013 [facility]- normal coronary arteries     Cataract      CVA (cerebral vascular accident) (H)     admitted at [facility]     on Cymbalta     Diabetes mellitus, type 2 (H)      Diabetic nephropathy (H)      Diabetic neuropathy (H)     severe     Diabetic retinopathy (H)      GERD (gastroesophageal reflux disease)      Hyperlipidemia      Hypertension     ECHO 2013, TDS, NL EF     POAG (primary open-angle glaucoma)     adv BE     Seasonal allergies      Tubular adenoma of colon 2013    repeat colonoscopy in 2018     Patient Active Problem List   Diagnosis     Cataract     CAD (coronary artery disease)     Diabetes mellitus, type 2 (H)     Diabetic nephropathy (H)     Diabetic neuropathy (H)     Diabetic retinopathy (H)     GERD (gastroesophageal reflux disease)     Glaucoma     Hypertension     Hyperlipidemia     CVA (cerebral vascular accident) (H)     Morbid obesity (H)     Anemia     Seasonal allergies     Depression     Tubular adenoma of colon     Leg weakness     Dermatitis, seborrheic     Eczematous dermatitis     History of coronary artery disease     Venous stasis dermatitis of both lower extremities     Uncontrolled type 2 diabetes mellitus (H)     Chronic dermatitis of hands     Past Surgical History:   Procedure Laterality Date     COLONOSCOPY  7/15/2013    Tubular adenoma; repeat in 2018;Procedure: COMBINED COLONOSCOPY, SINGLE BIOPSY/POLYPECTOMY BY BIOPSY;;  Surgeon: Don King MD;  Tubular adenoma     EXTRACAPSULAR CATARACT EXTRATION WITH INTRAOCULAR LENS IMPLANT  11-10-09, 2-9-10    11-10-09 Lt, 2-9-10 Rt; Left eye 8/2012     Social History     Social History     Marital status: Single     Spouse name: N/A     Number of children: N/A     Years of education: N/A     Occupational History     Not on file.      Social History Main Topics     Smoking status: Former Smoker     Quit date: 3/6/2009     Smokeless tobacco: Never Used     Alcohol use No     Drug use: No     Sexual activity: No     Other Topics Concern     Not on file     Social History Narrative    Pt has three daughters and two sons, single.  Her daughter Court, see's her often at the Nursing Home ([facility]).  Moved into Nursing Home in October 2011 after a hospitalization for ALY.  Moved from [place] in 2002 to [facility]. Has 5 grandchildren.     Family History   Problem Relation Age of Onset     Hypertension Mother      CEREBROVASCULAR DISEASE Mother      Glaucoma Father      CANCER Father      DIABETES Sister      Glaucoma Sister      Cancer - colorectal Other      CEREBROVASCULAR DISEASE Other      Skin Cancer No family hx of      Melanoma No family hx of      Lab Results   Component Value Date    A1C 7.5 10/24/2016    A1c                               7.3                           6/05/2017  SUBJECTIVE FINDINGS: A 67-year-old female returns to clinic for diabetic foot care and toenail care.  She relates she needs diabetic shoes.  No ulcers or sores since we have seen her last.  Presents in her wheelchair.  She relates her left first and second toes have been hurting on the end.  She keep bumping the toenails.        OBJECTIVE FINDINGS: DP and PT are 1/4 bilaterally.  She has decreased hair growth bilaterally.  She has minimal peripheral edema bilaterally.  She relates she is wearing her compression socks.  She has incurvated, thickened, brittle nails with subungual debris, dystrophy and discoloration 1, 2 and 5 bilaterally and to a lesser degree 3 and 4 bilaterally.  There is no erythema, no drainage, no odor, no calor bilaterally.  She has decreased ankle joint dorsiflexion bilaterally, decreased hair growth bilaterally.      ASSESSMENT AND PLAN: Onychomycosis bilaterally, onychauxis bilaterally.  She is diabetic with peripheral neuropathy  and vascular disease.  She also has an equinus present.  Diagnosis and treatment options discussed with her.  All the nails were debrided or reduced bilaterally upon consent.  Six nails were debrided.  Prescription for new diabetic shoes and inserts given and use discussed with her.  The left hallux nail border bled a bit upon debridement.  Local wound care done and use discussed with her.  She will return to clinic and see me in 3 months.

## 2018-01-16 ENCOUNTER — RECORDS - HEALTHEAST (OUTPATIENT)
Dept: LAB | Facility: CLINIC | Age: 68
End: 2018-01-16

## 2018-01-16 LAB
CREAT SERPL-MCNC: 1.1 MG/DL (ref 0.6–1.1)
GFR SERPL CREATININE-BSD FRML MDRD: 50 ML/MIN/1.73M2

## 2018-01-31 ENCOUNTER — OFFICE VISIT - HEALTHEAST (OUTPATIENT)
Dept: GERIATRICS | Facility: CLINIC | Age: 68
End: 2018-01-31

## 2018-01-31 DIAGNOSIS — N18.9 CKD (CHRONIC KIDNEY DISEASE): ICD-10-CM

## 2018-01-31 DIAGNOSIS — G62.9 NEUROPATHY: ICD-10-CM

## 2018-01-31 DIAGNOSIS — I25.10 CAD (CORONARY ARTERY DISEASE): ICD-10-CM

## 2018-01-31 DIAGNOSIS — I10 ESSENTIAL HYPERTENSION: ICD-10-CM

## 2018-02-20 ENCOUNTER — OFFICE VISIT (OUTPATIENT)
Dept: DERMATOLOGY | Facility: CLINIC | Age: 68
End: 2018-02-20
Payer: COMMERCIAL

## 2018-02-20 DIAGNOSIS — D48.5 NEOPLASM OF UNCERTAIN BEHAVIOR OF SKIN: Primary | ICD-10-CM

## 2018-02-20 DIAGNOSIS — L21.9 DERMATITIS, SEBORRHEIC: ICD-10-CM

## 2018-02-20 RX ORDER — KETOCONAZOLE 20 MG/ML
SHAMPOO TOPICAL
Qty: 240 ML | Refills: 11 | Status: ON HOLD | OUTPATIENT
Start: 2018-02-20 | End: 2023-01-01

## 2018-02-20 RX ORDER — CLOBETASOL PROPIONATE 0.5 MG/ML
SOLUTION TOPICAL 2 TIMES DAILY
Qty: 60 ML | Refills: 3 | Status: SHIPPED | OUTPATIENT
Start: 2018-02-20 | End: 2019-07-23

## 2018-02-20 RX ORDER — LIDOCAINE HYDROCHLORIDE AND EPINEPHRINE 10; 10 MG/ML; UG/ML
3 INJECTION, SOLUTION INFILTRATION; PERINEURAL ONCE
Qty: 3 ML | Refills: 0 | OUTPATIENT
Start: 2018-02-20 | End: 2018-02-20

## 2018-02-20 ASSESSMENT — PAIN SCALES - GENERAL: PAINLEVEL: SEVERE PAIN (6)

## 2018-02-20 NOTE — NURSING NOTE
Dermatology Rooming Note    Cristal Preciado's goals for this visit include:   Chief Complaint   Patient presents with     Derm Problem     Cristal is here today for her dermatitis on her face. It is painful to her face     Sejal Edge LPN

## 2018-02-20 NOTE — PROGRESS NOTES
[facility] Dermatology Note      Dermatology Problem List:  1. Seborrheic dermatitis: scalp and R ear  -current treatment: ketoconazole 2% shampoo 2x per week and clobetasol 0.05% solution BID daily as needed for scalp, and TAC 0.1% ointment BID to R ear     2. Hand dermatitis   -current treatment: Apply moisturizer (i.e. Vaseline, Eucerin or Aquaphor) to bilateral hands daily     3. Venous stasis dermatitis   -continue to moisturize and wear compression stalking daily    Encounter Date: Feb 20, 2018    CC:  Chief Complaint   Patient presents with     Derm Problem     Cristal is here today for her dermatitis on her face. It is painful to her face         History of Present Illness:  Ms. Cristal Preciado is a 67 year old female who presents for 6-month follow-up for her seborrheic dermatitis/hand dermatitis. The patient was last seen 8/17/2017 when she was started on ketoconazole shampoo.     Past Medical History:   Patient Active Problem List   Diagnosis     Cataract     CAD (coronary artery disease)     Diabetes mellitus, type 2 (H)     Diabetic nephropathy (H)     Diabetic neuropathy (H)     Diabetic retinopathy (H)     GERD (gastroesophageal reflux disease)     Glaucoma     Hypertension     Hyperlipidemia     CVA (cerebral vascular accident) (H)     Morbid obesity (H)     Anemia     Seasonal allergies     Depression     Tubular adenoma of colon     Leg weakness     Dermatitis, seborrheic     Eczematous dermatitis     History of coronary artery disease     Venous stasis dermatitis of both lower extremities     Uncontrolled type 2 diabetes mellitus (H)     Chronic dermatitis of hands     Past Medical History:   Diagnosis Date     AION (anterior ischaemic optic neuropathy), left eye     NAION LE     CAD (coronary artery disease) 3/2009    [facility]; Angio 2013 [facility]- normal coronary arteries     Cataract      CVA (cerebral vascular accident) (H)     admitted at [facility]     on  Cymbalta     Diabetes mellitus, type 2 (H)      Diabetic nephropathy (H)      Diabetic neuropathy (H)     severe     Diabetic retinopathy (H)      GERD (gastroesophageal reflux disease)      Hyperlipidemia      Hypertension     ECHO 2013, TDS, NL EF     POAG (primary open-angle glaucoma)     adv BE     Seasonal allergies      Tubular adenoma of colon 2013    repeat colonoscopy in 2018     Past Surgical History:   Procedure Laterality Date     COLONOSCOPY  7/15/2013    Tubular adenoma; repeat in 2018;Procedure: COMBINED COLONOSCOPY, SINGLE BIOPSY/POLYPECTOMY BY BIOPSY;;  Surgeon: Don King MD;  Tubular adenoma     EXTRACAPSULAR CATARACT EXTRATION WITH INTRAOCULAR LENS IMPLANT  11-10-09, 2-9-10    11-10-09 Lt, 2-9-10 Rt; Left eye 8/2012       Social History:  The patient lives in an assisted living facility.    Family History:  There is no family history of skin cancer.    Medications:  Current Outpatient Prescriptions   Medication Sig Dispense Refill     clobetasol (TEMOVATE) 0.05 % external solution Apply topically 2 times daily To itchy and scaly areas of scalp as needed. 60 mL 3     triamcinolone (KENALOG) 0.1 % ointment Twice a day to area of rash and itch on right ear 80 g 1     HUMALOG KWIKPEN 200 UNIT/ML soln Inject 75 units at breakfast, 72 units with lunch and 68 with dinner plus sliding scale (2/40 over 180 starting at 4 units).  Approx  230 units daily 75 mL 11     Elastic Bandages & Supports (JOBST KNEE HIGH COMPRESSION SM) MISC JOBST 20-30MMHG COMPRESSION SM MISC   To both legs during waking hours daily for leg swelling and venous stasis dermatitis. Do not sleep in stockings. 2 each 3     olopatadine HCl (PATADAY) 0.2 % SOLN Place 1 drop into both eyes daily as needed 1 Bottle 11     insulin glargine (LANTUS SOLOSTAR) 100 UNIT/ML PEN Take 80 units in the morning and 90 units in the evening. 60 mL 11     insulin pen needle (B-D U/F) 31G X 5 MM Use 5 time(s) per day.  Please dispense as BD Pen  Needle Mini U/F 31G x 5  each 11     Pregabalin (LYRICA PO)        pimecrolimus (ELIDEL) 1 % cream Apply topically 2 times daily To areas of rash at ears as needed until clear. 60 g 11     triamcinolone (KENALOG) 0.1 % ointment Apply topically daily To legs under compression stockings for stasis dermatitis discoloration. 60 g 5     Podiatric Products (AMLACTIN FOOT CREAM THERAPY EX) Externally apply 12 % topically       artificial tears OINT ophthalmic ointment 0.25 inches At Bedtime       calcium-vitamin D 500-125 MG-UNIT TABS Take 1 tablet by mouth 2 times daily       UNABLE TO FIND MEDICATION NAME: milk of magnesia       minocycline (MINOCIN,DYNACIN) 100 MG capsule Take 1 capsule (100 mg) by mouth 2 times daily 28 capsule 5     ketoconazole (NIZORAL) 2 % shampoo To entire wet scalp and ears and then wash off after 5 minutes three times a week. 240 mL 11     ketoconazole (NIZORAL) 2 % cream Apply topically 2 times daily To affected right ear mixed with triamcinolone ointment until return to clinic. 60 g 5     triamcinolone (KENALOG) 0.1 % ointment Apply topically 2 times daily To rash on the right ear mixed with ketoconazole cream until return to clinic. 60 g 5     gabapentin (NEURONTIN) 600 MG tablet Take 600 mg in AM , 600 mg afternoon and 900 mg at HS. 90 tablet 1     DULoxetine (CYMBALTA) 30 MG capsule Take 1 capsule (30 mg) by mouth daily 30 capsule 4     clotrimazole (LOTRIMIN) 1 % cream To toenails twice daily. 60 g 6     dorzolamide-timolol (COSOPT) 2-0.5 % ophthalmic solution Place 1 drop into both eyes 2 times daily       ranitidine (ZANTAC) 150 MG tablet Take 150 mg by mouth 2 times daily       mometasone (ELOCON) 0.1 % cream Apply sparingly to left medial ankle area daily.  Do not apply to face. 45 g 0     carboxymethylcellulose (REFRESH PLUS) 0.5 % SOLN 1 drop 3 times daily as needed.       omeprazole (PRILOSEC) 20 MG capsule Take  by mouth daily. Take one tab daily       sodium chloride (SODIUM  CHLORIDE) 0.65 % nasal spray Spray 1 spray in nostril daily as needed.       lisinopril (PRINIVIL,ZESTRIL) 5 MG tablet Take 2 tablets by mouth daily. Take one tab daily/Hold for SBP < 110 90 tablet 3     latanoprost (XALATAN) 0.005 % ophthalmic solution 1 drop At Bedtime. Left eye       allopurinol (ZYLOPRIM) 100 MG tablet Take 2 tablets by mouth daily. 180 tablet 1     hydrocortisone 1 % cream Apply  topically 2 times daily.       hydrocortisone 2.5 % cream Apply  topically 2 times daily.       liraglutide (VICTOZA) 18 MG/3ML SOLN Inject 1.8 mg Subcutaneous daily. Start with 0.6 mg x 5 days, then increase to 1.2 mg x 5 days, then 1.8 mg thereafter.  Do not advance to higher dose if you have nausea. 3 Month 3     ibuprofen (ADVIL,MOTRIN) 400 MG tablet Take 400 mg by mouth every 6 hours as needed.       chlorthalidone (HYGROTEN) 12.5 MG TABS Take 12.5 mg by mouth daily.       loratadine (CLARITIN) 10 MG tablet Take 10 mg by mouth as needed.       Hypromellose (ARTIFICIAL TEARS OP) Apply  to eye as needed.       Calcium Citrate-Vitamin D (CALCIUM CITRATE + D PO) 600/400 mg/unit take 1 tabs daily twice daily.        nitroGLYCERIN (NITROSTAT) 0.4 MG SL tablet Place 0.4 mg under the tongue every 5 minutes as needed.       acetaminophen (TYLENOL) 500 MG tablet Take 1-2 tablets by mouth every 6 hours as needed. Tylenol Extra Strength.        Magnesium Hydroxide (MILK OF MAGNESIA PO) Take 30 cc as needed PRN constipation.       atorvastatin (LIPITOR) 80 MG tablet Take 1 tablet by mouth daily. Pt needs to have labs done prior to further refills. 90 tablet 0     folic acid (FOLVITE) 1 MG tablet Take 1 mg by mouth daily.       metoprolol (LOPRESSOR) 50 MG tablet Take 50 mg by mouth 2 times daily.       aspirin 325 MG tablet Take 325 mg by mouth daily.       clopidogrel (PLAVIX) 75 MG tablet Take 75 mg by mouth daily.       Allergies   Allergen Reactions     Dust Mites Other (See Comments)     Sneezing runny eyes and nose.      Food Allergy Formula Hives     Mountain Dew and Walnuts     Pollen Extract Other (See Comments)     Sneezing runny eyes and nose.         Review of Systems:  -Skin Establ Pt: The patient denies any new rash, pruritus, or lesions that are symptomatic, changing or bleeding, except as per HPI.  -Constitutional: The patient denies fatigue, fevers, chills, unintended weight loss, and night sweats.  -HEENT: Patient denies nonhealing oral sores.  -Skin: As above in HPI. No additional skin concerns.    Physical exam:  Vitals: There were no vitals taken for this visit.  GEN: This is a well developed, obese female in no acute distress, in a pleasant mood.    SKIN: {Skin Exam:129120}  -***  -***  -***  -No other lesions of concern on areas examined.     Impression/Plan:  1. {Diagnosesderm:332496}    {dermmedicalstudent:490119}    ***    2. {Diagnosesderm:248757}    {dermmedicalstudent:334032}    ***    3. {Diagnosesderm:248063}    {dermmedicalstudent:866690}    ***    4. {Diagnosesderm:691446}    {dermmedicalstudent:338012}    ***    CC  *** on close of this encounter.  Follow-up {rfmultiple:960425} {follow up in days/weeks/months:393260}.     Staff Involved:  Scribed by ***, MS*** for  ***.

## 2018-02-20 NOTE — PROGRESS NOTES
CHIEF COMPLAINT:  Irritated growth on face.      HISTORY OF PRESENT ILLNESS:  Cristal is a very pleasant 67-year-old female who we have seen in clinic previously for several concerns including seborrheic dermatitis, hand dermatitis and venous stasis.  She was last seen in our clinic on 08/17/2017 at which time we prescribed ketoconazole 2% shampoo 2 times weekly for her scalp as well as clobetasol solution as needed for itching.  Today she reports that this is generally well controlled with these medications.  We had recommended liberal emollients for her hands only, and she thinks this issue is generally well controlled.  She also is using moisturizers and compression stockings for her venous stasis.  Today her main concern is an irritated growth on her right cheek that has been present for several months and is painful to the touch.  She has no similar growth elsewhere and has not had any treatment to date.      REVIEW OF SYSTEMS:  No recent fevers.  No joint pain.      PHYSICAL EXAMINATION:   GENERAL:  This is a somewhat chronically ill-appearing, older female with a normal mood and affect who is oriented x3.   SKIN:  A cutaneous exam of the head, neck and bilateral upper extremities was performed.  On the right cheek, there is a filiform, firm, hyperkeratotic papule consistent with a cutaneous horn which is approximately 3 mm in diameter.  On the ears, there is mild lichenification and faint pink erythema and there is fine scale diffusely on the scalp.  The hands are generally well moisturized without erythema.      ASSESSMENT AND PLAN:   1.  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  This lesion appears to be a cutaneous horn; the differential diagnosis includes seborrheic keratosis, actinic keratosis or squamous cell carcinoma.  The latter 2 seem unlikely in an -American patient.  A biopsy was performed today for clarification.  Please see the procedure note below.   2.  Seborrheic dermatitis.   She will continue ketoconazole shampoo 2-3 times weekly as well as clobetasol solution as needed for itching.   3.  Mild xerotic hand dermatitis.  She will continue gentle skin care and liberal emollients.   4.  Venous stasis dermatitis.  She will continue compression stockings as recommended at her last visit.      PROCEDURE NOTE:  After signed informed consent, the affected area was swabbed with an alcohol pad and injected with 1% lidocaine.  A biopsy via shave technique was taken and sent for histopathology.  Hemostasis was achieved with aluminum chloride 20%.  The defect was covered with petrolatum and a bandage.  The patient tolerated this without complication.       Rex Rubio MD  Dermatology Attending

## 2018-02-20 NOTE — LETTER
2/20/2018       RE: Cristal Preciado  [facility]  [address]  [place] [address]     Dear Colleague,    Thank you for referring your patient, Cristal Preciado, to the [facility] DERMATOLOGY at [facility]. Please see a copy of my visit note below.    CHIEF COMPLAINT:  Irritated growth on face.      HISTORY OF PRESENT ILLNESS:  Cristal is a very pleasant 67-year-old female who we have seen in clinic previously for several concerns including seborrheic dermatitis, hand dermatitis and venous stasis.  She was last seen in our clinic on 08/17/2017 at which time we prescribed ketoconazole 2% shampoo 2 times weekly for her scalp as well as clobetasol solution as needed for itching.  Today she reports that this is generally well controlled with these medications.  We had recommended liberal emollients for her hands only, and she thinks this issue is generally well controlled.  She also is using moisturizers and compression stockings for her venous stasis.  Today her main concern is an irritated growth on her right cheek that has been present for several months and is painful to the touch.  She has no similar growth elsewhere and has not had any treatment to date.      REVIEW OF SYSTEMS:  No recent fevers.  No joint pain.      PHYSICAL EXAMINATION:   GENERAL:  This is a somewhat chronically ill-appearing, older female with a normal mood and affect who is oriented x3.   SKIN:  A cutaneous exam of the head, neck and bilateral upper extremities was performed.  On the right cheek, there is a filiform, firm, hyperkeratotic papule consistent with a cutaneous horn which is approximately 3 mm in diameter.  On the ears, there is mild lichenification and faint pink erythema and there is fine scale diffusely on the scalp.  The hands are generally well moisturized without erythema.      ASSESSMENT AND PLAN:   1.  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  This lesion  appears to be a cutaneous horn; the differential diagnosis includes seborrheic keratosis, actinic keratosis or squamous cell carcinoma.  The latter 2 seem unlikely in an -American patient.  A biopsy was performed today for clarification.  Please see the procedure note below.   2.  Seborrheic dermatitis.  She will continue ketoconazole shampoo 2-3 times weekly as well as clobetasol solution as needed for itching.   3.  Mild xerotic hand dermatitis.  She will continue gentle skin care and liberal emollients.   4.  Venous stasis dermatitis.  She will continue compression stockings as recommended at her last visit.      PROCEDURE NOTE:  After signed informed consent, the affected area was swabbed with an alcohol pad and injected with 1% lidocaine.  A biopsy via shave technique was taken and sent for histopathology.  Hemostasis was achieved with aluminum chloride 20%.  The defect was covered with petrolatum and a bandage.  The patient tolerated this without complication.       Rex Rubio MD  Dermatology Attending

## 2018-02-20 NOTE — PATIENT INSTRUCTIONS

## 2018-02-20 NOTE — MR AVS SNAPSHOT
After Visit Summary   2/20/2018    Cristal Preciado    MRN: [id number]           Patient Information     Date Of Birth          1950        Visit Information        Provider Department      2/20/2018 10:45 AM Rex Rubio MD [facility] Dermatology        Today's Diagnoses     Neoplasm of uncertain behavior of skin    -  1    Dermatitis, seborrheic          Care Instructions    Wound Care After a Biopsy    What is a skin biopsy?  A skin biopsy allows the doctor to examine a very small piece of tissue under the microscope to determine the diagnosis and the best treatment for the skin condition. A local anesthetic (numbing medicine)  is injected with a very small needle into the skin area to be tested. A small piece of skin is taken from the area. Sometimes a suture (stitch) is used.     What are the risks of a skin biopsy?  I will experience scar, bleeding, swelling, pain, crusting and redness. I may experience incomplete removal or recurrence. Risks of this procedure are excessive bleeding, bruising, infection, nerve damage, numbness, thick (hypertrophic or keloidal) scar and non-diagnostic biopsy.    How should I care for my wound for the first 24 hours?    Keep the wound dry and covered for 24 hours    If it bleeds, hold direct pressure on the area for 15 minutes. If bleeding does not stop then go to the emergency room    Avoid strenuous exercise the first 1-2 days or as your doctor instructs you    How should I care for the wound after 24 hours?    After 24 hours, remove the bandage    You may bathe or shower as normal    If you had a scalp biopsy, you can shampoo as usual and can use shower water to clean the biopsy site daily    Clean the wound twice a day with gentle soap and water    Do not scrub, be gentle    Apply white petroleum/Vaseline after cleaning the wound with a cotton swab or a clean finger, and keep the site covered with a Bandaid /bandage. Bandages are not necessary with a  scalp biopsy    If you are unable to cover the site with a Bandaid /bandage, re-apply ointment 2-3 times a day to keep the site moist. Moisture will help with healing    Avoid strenuous activity for first 1-2 days    Avoid lakes, rivers, pools, and oceans until the stitches are removed or the site is healed    How do I clean my wound?    Wash hands thoroughly with soap or use hand  before all wound care    Clean the wound with gentle soap and water    Apply white petroleum/Vaseline  to wound after it is clean    Replace the Bandaid /bandage to keep the wound covered for the first few days or as instructed by your doctor    If you had a scalp biopsy, warm shower water to the area on a daily basis should suffice    What should I use to clean my wound?     Cotton-tipped applicators (Qtips )    White petroleum jelly (Vaseline ). Use a clean new container and use Q-tips to apply.    Bandaids   as needed    Gentle soap     How should I care for my wound long term?    Do not get your wound dirty    Keep up with wound care for one week or until the area is healed.    A small scab will form and fall off by itself when the area is completely healed. The area will be red and will become pink in color as it heals. Sun protection is very important for how your scar will turn out. Sunscreen with an SPF 30 or greater is recommended once the area is healed.    If you have stitches, stitches need to be removed in  days. You may return to our clinic for this or you may have it done locally at your doctor s office.    You should have some soreness but it should be mild and slowly go away over several days. Talk to your doctor about using tylenol for pain,    When should I call my doctor?  If you have increased:     Pain or swelling    Pus or drainage (clear or slightly yellow drainage is ok)    Temperature over 100F    Spreading redness or warmth around wound    When will I hear about my results?  The biopsy results can take  2-3 weeks to come back. The clinic will call you with the results, send you a [employer] message, or have you schedule a follow-up clinic or phone time to discuss the results. Contact our clinics if you do not hear from us in 3 weeks.     Who should I call with questions?    [facility]: [phone]     [facility]: [phone]    For urgent needs outside of business hours call the [facility] at [phone] and ask for the dermatology resident on call              Follow-ups after your visit        Follow-up notes from your care team     Return in about 6 months (around 8/20/2018).      Your next 10 appointments already scheduled     Mar 16, 2018 10:00 AM CDT   (Arrive by 9:45 AM)   RETURN DIABETES with Renetta Washington PA-C   [facility] End[facility]ology ([facility])    [address] [place] [address] [phone]            Mar 22, 2018  1:30 PM CDT   (Arrive by 1:15 PM)   Return Visit with Vu Grant DPM   [facility] ([facility])    [address] [place] [address] [phone]              Who to contact     Please call your clinic at [phone] to:    Ask questions about your health    Make or cancel appointments    Discuss your medicines    Learn about your test results    Speak to your doctor            Additional Information About Your Visit        MyChart Information     [employer] is an electronic gateway that provides easy, online access to your medical records. With [employer], you can request a clinic appointment, read your test results, renew a prescription or communicate with your care team.     To sign up for [employer] visit the website at [email or web address]   You will be asked to enter the access code listed below, as well as some personal information. Please follow the directions to create your username and  password.     Your access code is: [email or web address]  Expires: 2018 11:41 AM     Your access code will  in 90 days. If you need help or a new code, please contact your [facility] or call [phone] for assistance.        Care EveryWhere ID     This is your Care EveryWhere ID. This could be used by other organizations to access your [place] medical records  [id number]         Blood Pressure from Last 3 Encounters:   17 124/51   17 138/84   10/24/16 119/77    Weight from Last 3 Encounters:   16 106.5 kg (234 lb 12.8 oz)   03/02/15 105.7 kg (233 lb)   14 111.6 kg (246 lb)              We Performed the Following     BIOPSY SKIN/SUBQ/MUC MEM, SINGLE LESION     Surgical pathology exam          Today's Medication Changes          These changes are accurate as of 18 11:41 AM.  If you have any questions, ask your nurse or doctor.               Start taking these medicines.        Dose/Directions    lidocaine 1% with EPINEPHrine 1:100,000 1 %-1:669619 injection   Used for:  Neoplasm of uncertain behavior of skin   Started by:  Rex Rubio MD        Dose:  3 mL   Inject 3 mLs into the skin once for 1 dose   Quantity:  3 mL   Refills:  0            Where to get your medicines      These medications were sent to [facility] - [place], [place] - [address]  [address], [facility] [address]     Phone:  [phone]     clobetasol 0.05 % external solution    ketoconazole 2 % shampoo         Some of these will need a paper prescription and others can be bought over the counter.  Ask your nurse if you have questions.     You don't need a prescription for these medications     lidocaine 1% with EPINEPHrine 1:100,000 1 %-1:698249 injection                Primary Care Provider Office Phone # Fax #    Meagan Valdez [phone]       [facility] [address] [place] [address]         Equal Access to Services     [facility]: Hadii hilario prieto judah Carvalho, waashleyda luqjuan f, qaybta kashabnamjanet jose, camilla ding. So [facility] [phone].    ATENCIÓN: Si habla nay, tiene a reyes disposición servicios gratuitos de asistencia lingüística. Jozefame al [phone].    We comply with applicable federal civil rights laws and [place] laws. We do not discriminate on the basis of race, color, national origin, age, disability, sex, sexual orientation, or gender identity.            Thank you!     Thank you for choosing [facility] DERMATOLOGY  for your care. Our goal is always to provide you with excellent care. Hearing back from our patients is one way we can continue to improve our services. Please take a few minutes to complete the written survey that you may receive in the mail after your visit with us. Thank you!             Your Updated Medication List - Protect others around you: Learn how to safely use, store and throw away your medicines at www.disposemymeds.org.          This list is accurate as of 2/20/18 11:41 AM.  Always use your most recent med list.                   Brand Name Dispense Instructions for use Diagnosis    acetaminophen 500 MG tablet    TYLENOL     Take 1-2 tablets by mouth every 6 hours as needed. Tylenol Extra Strength.        allopurinol 100 MG tablet    ZYLOPRIM    180 tablet    Take 2 tablets by mouth daily.    Hyperuricemia       AMLACTIN FOOT CREAM THERAPY EX      Externally apply 12 % topically        artificial tears Oint ophthalmic ointment      0.25 inches At Bedtime        ARTIFICIAL TEARS OP      Apply  to eye as needed.        aspirin 325 MG tablet      Take 325 mg by mouth daily.        atorvastatin 80 MG tablet    LIPITOR    90 tablet    Take 1 tablet by mouth daily. Pt needs to have labs done prior to further refills.    Other and unspecified hyperlipidemia       CALCIUM CITRATE + D PO      600/400 mg/unit take 1 tabs daily twice  daily.        calcium-vitamin D 500-125 MG-UNIT Tabs      Take 1 tablet by mouth 2 times daily        carboxymethylcellulose 0.5 % Soln ophthalmic solution    REFRESH PLUS     1 drop 3 times daily as needed.        chlorthalidone 12.5 mg Tabs half-tab    HYGROTON     Take 12.5 mg by mouth daily.        CLARITIN 10 MG tablet   Generic drug:  loratadine      Take 10 mg by mouth as needed.        clobetasol 0.05 % external solution    TEMOVATE    60 mL    Apply topically 2 times daily To itchy and scaly areas of scalp as needed.    Dermatitis, seborrheic       clotrimazole 1 % cream    LOTRIMIN    60 g    To toenails twice daily.    Dermatophytosis of nail, Type II or unspecified type diabetes mellitus with neurological manifestations, not stated as uncontrolled(250.60) (H)       dorzolamide-timolol 2-0.5 % ophthalmic solution    COSOPT     Place 1 drop into both eyes 2 times daily        DULoxetine 30 MG EC capsule    CYMBALTA    30 capsule    Take 1 capsule (30 mg) by mouth daily    Depression       folic acid 1 MG tablet    FOLVITE     Take 1 mg by mouth daily.        gabapentin 600 MG tablet    NEURONTIN    90 tablet    Take 600 mg in AM , 600 mg afternoon and 900 mg at HS.    Diabetic neuropathy (H)       HUMALOG KWIKPEN 200 UNIT/ML soln   Generic drug:  Insulin Lispro     75 mL    Inject 75 units at breakfast, 72 units with lunch and 68 with dinner plus sliding scale (2/40 over 180 starting at 4 units).  Approx  230 units daily    Type 2 diabetes mellitus, controlled (H)       * hydrocortisone 1 % cream    CORTAID     Apply  topically 2 times daily.        * hydrocortisone 2.5 % cream      Apply  topically 2 times daily.        ibuprofen 400 MG tablet    ADVIL/MOTRIN     Take 400 mg by mouth every 6 hours as needed.        insulin glargine 100 UNIT/ML injection    LANTUS SOLOSTAR    60 mL    Take 80 units in the morning and 90 units in the evening.    Type 2 diabetes mellitus, controlled (H)       insulin pen  needle 31G X 5 MM    B-D U/F    150 each    Use 5 time(s) per day.  Please dispense as BD Pen Needle Mini U/F 31G x 5 MM    Type 2 diabetes mellitus, controlled (H)       JOBST KNEE HIGH COMPRESSION SM Misc     2 each    JOBST 20-30MMHG COMPRESSION SM MISC  To both legs during waking hours daily for leg swelling and venous stasis dermatitis. Do not sleep in stockings.    Venous stasis dermatitis of both lower extremities       * ketoconazole 2 % cream    NIZORAL    60 g    Apply topically 2 times daily To affected right ear mixed with triamcinolone ointment until return to clinic.    Dermatitis       * ketoconazole 2 % shampoo    NIZORAL    240 mL    To entire wet scalp and ears and then wash off after 5 minutes three times a week.    Dermatitis, seborrheic       lidocaine 1% with EPINEPHrine 1:100,000 1 %-1:459699 injection     3 mL    Inject 3 mLs into the skin once for 1 dose    Neoplasm of uncertain behavior of skin       liraglutide 18 MG/3ML Soln    VICTOZA    3 Month    Inject 1.8 mg Subcutaneous daily. Start with 0.6 mg x 5 days, then increase to 1.2 mg x 5 days, then 1.8 mg thereafter.  Do not advance to higher dose if you have nausea.    Diabetes mellitus, type 2 (H)       lisinopril 5 MG tablet    PRINIVIL/ZESTRIL    90 tablet    Take 2 tablets by mouth daily. Take one tab daily/Hold for SBP < 110    Essential hypertension, benign       LYRICA PO           metoprolol tartrate 50 MG tablet    LOPRESSOR     Take 50 mg by mouth 2 times daily.        MILK OF MAGNESIA PO      Take 30 cc as needed PRN constipation.        minocycline 100 MG capsule    MINOCIN/DYNACIN    28 capsule    Take 1 capsule (100 mg) by mouth 2 times daily    Dermatitis, seborrheic       mometasone 0.1 % cream    ELOCON    45 g    Apply sparingly to left medial ankle area daily.  Do not apply to face.        nitroGLYcerin 0.4 MG sublingual tablet    NITROSTAT     Place 0.4 mg under the tongue every 5 minutes as needed.        olopatadine  HCl 0.2 % Soln    [facility]    1 Bottle    Place 1 drop into both eyes daily as needed    History of itching of eye       omeprazole 20 MG CR capsule    priLOSEC     Take  by mouth daily. Take one tab daily        pimecrolimus 1 % cream    ELIDEL    60 g    Apply topically 2 times daily To areas of rash at ears as needed until clear.    Dermatitis, seborrheic       PLAVIX 75 MG tablet   Generic drug:  clopidogrel      Take 75 mg by mouth daily.        ranitidine 150 MG tablet    ZANTAC     Take 150 mg by mouth 2 times daily        sodium chloride 0.65 % nasal spray    OCEAN     Spray 1 spray in nostril daily as needed.        * triamcinolone 0.1 % ointment    KENALOG    60 g    Apply topically 2 times daily To rash on the right ear mixed with ketoconazole cream until return to clinic.    Dermatitis       * triamcinolone 0.1 % ointment    KENALOG    60 g    Apply topically daily To legs under compression stockings for stasis dermatitis discoloration.    Venous stasis dermatitis of both lower extremities       * triamcinolone 0.1 % ointment    KENALOG    80 g    Twice a day to area of rash and itch on right ear    Dermatitis, seborrheic       UNABLE TO FIND      MEDICATION NAME: milk of magnesia        XALATAN 0.005 % ophthalmic solution   Generic drug:  latanoprost      1 drop At Bedtime. Left eye        * Notice:  This list has 7 medication(s) that are the same as other medications prescribed for you. Read the directions carefully, and ask your doctor or other care provider to review them with you.

## 2018-02-21 LAB — COPATH REPORT: NORMAL

## 2018-02-25 PROBLEM — D48.5 NEOPLASM OF UNCERTAIN BEHAVIOR OF SKIN: Status: ACTIVE | Noted: 2018-02-25

## 2018-03-07 ENCOUNTER — OFFICE VISIT - HEALTHEAST (OUTPATIENT)
Dept: GERIATRICS | Facility: CLINIC | Age: 68
End: 2018-03-07

## 2018-03-07 DIAGNOSIS — E11.9 TYPE 2 DIABETES MELLITUS (H): ICD-10-CM

## 2018-03-07 DIAGNOSIS — I10 ESSENTIAL HYPERTENSION: ICD-10-CM

## 2018-03-07 DIAGNOSIS — E11.42 DIABETIC PERIPHERAL NEUROPATHY ASSOCIATED WITH TYPE 2 DIABETES MELLITUS (H): ICD-10-CM

## 2018-03-07 DIAGNOSIS — L20.9 ATOPIC DERMATITIS, UNSPECIFIED TYPE: ICD-10-CM

## 2018-03-07 DIAGNOSIS — R60.0 BILATERAL LOWER EXTREMITY EDEMA: ICD-10-CM

## 2018-03-07 DIAGNOSIS — E78.5 HYPERLIPIDEMIA: ICD-10-CM

## 2018-03-16 ENCOUNTER — OFFICE VISIT (OUTPATIENT)
Dept: ENDOCRINOLOGY | Facility: CLINIC | Age: 68
End: 2018-03-16
Payer: COMMERCIAL

## 2018-03-16 VITALS — DIASTOLIC BLOOD PRESSURE: 62 MMHG | HEART RATE: 80 BPM | SYSTOLIC BLOOD PRESSURE: 131 MMHG

## 2018-03-16 DIAGNOSIS — E11.9 WELL CONTROLLED TYPE 2 DIABETES MELLITUS (H): Primary | ICD-10-CM

## 2018-03-16 LAB — HBA1C MFR BLD: 7.1 % (ref 4.3–6)

## 2018-03-16 RX ORDER — DULOXETIN HYDROCHLORIDE 30 MG/1
90 CAPSULE, DELAYED RELEASE ORAL EVERY MORNING
Status: ON HOLD | COMMUNITY
End: 2023-01-01

## 2018-03-16 RX ORDER — CHLORTHALIDONE 25 MG/1
25 TABLET ORAL EVERY MORNING
Status: ON HOLD | COMMUNITY
End: 2023-01-01

## 2018-03-16 RX ORDER — FERROUS SULFATE 325(65) MG
325 TABLET ORAL
COMMUNITY
End: 2021-09-10

## 2018-03-16 NOTE — NURSING NOTE
"Chief Complaint   Patient presents with     RECHECK     DIABETES TYPE 2 F/U        Initial /62 (BP Location: Right arm)  Pulse 80 Estimated body mass index is 41.59 kg/(m^2) as calculated from the following:    Height as of 7/20/16: 1.6 m (5' 3\").    Weight as of 7/20/16: 106.5 kg (234 lb 12.8 oz).  Medication Reconciliation: complete    "

## 2018-03-16 NOTE — PROGRESS NOTES
HPI   Ms. Preciado returns for follow up of type 2 diabetes.  Blood sugars have been running mostly under 200 lately.  Her caretakers at [facility] have been testing her blood sugars 4 times a day.  She is currently taking Lantus 100 units in the morning and 100 units at night and Novolog 94 units at breakfast, 90 units with lunch and 74 with dinner plus sliding scale (NP at her nursing home raised her Novolog doses and implemented a more relaxed sliding scale of 2/50 over 200- she had previously been on 2/20 over 180 starting at 4 units).   She is also on liraglutide 1.8 mg daily and is tolerating this fine.  She is taking gabapentin for neuropathy.  She is also on Cymbalta.  Neuropathy in feet better, she will be getting new shoes.  She is seeing Dr. Grant for her feet.  She otherwise has been feeling well and in her usual state of health. She has no other concerns today.     ROS   GENERAL: Lost a few pounds.  No fevers, chills,  night sweats.  HEENT: +itchy, watery eyes disturbs her reading. +glaucoma.  no dysphagia, diplopia, neck pain or tenderness, dry/scratchy eyes, URI, cough, sinus drainage, tinnitus, sinus pressure.   CV: No CP, SOB.  No palpitations, skipped beats, LOC.  LUNGS: no cough, sputum production, wheezing   ABDOMEN: no diarrhea, constipation, abdominal pain  EXTREMITIES:  She has ongoing edema.  Hands and feet feel swollen.  Has thickened toenails, not cutting into skin.  No pain. She sees Dr. Grant.  Dry skin.  Leg ulcerations healed.  Callus on left heel.   NEUROLOGY: no changes in vision.  Continued tingling and numbness in hands and feet.   MSK: weakness in legs after stroke.  Needs help getting out of wheelchair. Does not use walker any more due to weakness.      PMH   Type 2 diabetes  Neuropathy   Nephropathy  Stroke 2010- uses a walker, but mainly in wheelchair.   CAD, s/p stent 2009  HTN  Dyslipidemia  Cataracts  Glaucoma  GERD    Family Hx:   Mom- stroke  Dad- cancer  1 of 12  "children  2 brothers and 2 sisters-  cancer- unsure of type  1 sister with diabetes  5 children  Son- MS  Daughter- MS  6 grandchildren    Social Hx:   Ms. Preciado lives at [facility].  She keeps herself busy with many activities in the day, exercises (\"light and lively\"), crafts, coloring, baking, light bowling. She has many friends in their 90's there and she enjoys their company. She is seeing her family about 1-2 times a month now.     Has 5 children, all now living in [facility].  6 grandchildren.  4 grandchildren.  Originally from [place].     Current Medications  Current Outpatient Prescriptions   Medication Sig Dispense Refill     ASPIRIN PO Take 81 mg by mouth daily       CHLORTHALIDONE PO Take 25 mg by mouth daily       DULoxetine HCl (CYMBALTA PO) Take 60 mg by mouth daily       ferrous sulfate (IRON) 325 (65 FE) MG tablet Take 325 mg by mouth daily (with breakfast)       insulin aspart (NOVOLOG VIAL) 100 UNITS/ML injection        Insulin Glargine (LANTUS SC) Inject 100 Units Subcutaneous 2 times daily       metoprolol (LOPRESSOR) 10 mg/mL SUSP Take 100 mg by mouth 2 times daily       clobetasol (TEMOVATE) 0.05 % external solution Apply topically 2 times daily To itchy and scaly areas of scalp as needed. 60 mL 3     Elastic Bandages & Supports (JOBST KNEE HIGH COMPRESSION SM) MISC JOBST 20-30MMHG COMPRESSION SM MISC   To both legs during waking hours daily for leg swelling and venous stasis dermatitis. Do not sleep in stockings. 2 each 3     insulin pen needle (B-D U/F) 31G X 5 MM Use 5 time(s) per day.  Please dispense as BD Pen Needle Mini U/F 31G x 5  each 11     Pregabalin (LYRICA PO)        triamcinolone (KENALOG) 0.1 % ointment Apply topically daily To legs under compression stockings for stasis dermatitis discoloration. 60 g 5     Podiatric Products (AMLACTIN FOOT CREAM THERAPY EX) Externally apply 12 % topically       gabapentin (NEURONTIN) 600 MG tablet Take 600 " mg in AM , 600 mg afternoon and 900 mg at HS. 90 tablet 1     dorzolamide-timolol (COSOPT) 2-0.5 % ophthalmic solution Place 1 drop into both eyes 2 times daily       ranitidine (ZANTAC) 150 MG tablet Take 150 mg by mouth 2 times daily       carboxymethylcellulose (REFRESH PLUS) 0.5 % SOLN 1 drop 3 times daily as needed.       sodium chloride (SODIUM CHLORIDE) 0.65 % nasal spray Spray 1 spray in nostril daily as needed.       latanoprost (XALATAN) 0.005 % ophthalmic solution 1 drop At Bedtime. Left eye       allopurinol (ZYLOPRIM) 100 MG tablet Take 2 tablets by mouth daily. 180 tablet 1     liraglutide (VICTOZA) 18 MG/3ML SOLN Inject 1.8 mg Subcutaneous daily. Start with 0.6 mg x 5 days, then increase to 1.2 mg x 5 days, then 1.8 mg thereafter.  Do not advance to higher dose if you have nausea. 3 Month 3     ibuprofen (ADVIL,MOTRIN) 400 MG tablet Take 400 mg by mouth every 6 hours as needed.       loratadine (CLARITIN) 10 MG tablet Take 10 mg by mouth as needed.       Calcium Citrate-Vitamin D (CALCIUM CITRATE + D PO) 600/400 mg/unit take 1 tabs daily twice daily.        nitroGLYCERIN (NITROSTAT) 0.4 MG SL tablet Place 0.4 mg under the tongue every 5 minutes as needed.       acetaminophen (TYLENOL) 500 MG tablet Take 1-2 tablets by mouth every 6 hours as needed. Tylenol Extra Strength.        atorvastatin (LIPITOR) 80 MG tablet Take 1 tablet by mouth daily. Pt needs to have labs done prior to further refills. 90 tablet 0     folic acid (FOLVITE) 1 MG tablet Take 1 mg by mouth daily.       ketoconazole (NIZORAL) 2 % shampoo To entire wet scalp and ears and then wash off after 5 minutes three times a week. 240 mL 11     olopatadine HCl (PATADAY) 0.2 % SOLN Place 1 drop into both eyes daily as needed (Patient not taking: Reported on 3/16/2018) 1 Bottle 11     ketoconazole (NIZORAL) 2 % cream Apply topically 2 times daily To affected right ear mixed with triamcinolone ointment until return to clinic. 60 g 5      mometasone (ELOCON) 0.1 % cream Apply sparingly to left medial ankle area daily.  Do not apply to face. 45 g 0     lisinopril (PRINIVIL,ZESTRIL) 5 MG tablet Take 2 tablets by mouth daily. Take one tab daily/Hold for SBP < 110 (Patient not taking: Reported on 3/16/2018) 90 tablet 3       Physical Exam   /62 (BP Location: Right arm)  Pulse 80  GENERAL: VSS.  Answering questions appropriately, appears stated age. Sitting in wheelchair.   LUNGS: CTA bilaterally. No wheezes, rales, ronchi  EXTREMITIES: Legs with TYREL hose. 1+ edema bilaterally.    RESULTS  Lab Results   Component Value Date    A1C 7.5 (H) 10/24/2016    A1C 7.5 (H) 10/12/2015    A1C 8.1 (H) 03/06/2014    A1C 7.2 (H) 03/07/2013    A1C 7.7 (A) 02/13/2013    HEMOGLOBINA1 7.3 (A) 06/05/2017    HEMOGLOBINA1 7.3 (A) 06/05/2017    HEMOGLOBINA1 7.1 (A) 02/27/2017    HEMOGLOBINA1 8.3 (A) 07/20/2016    HEMOGLOBINA1 7.3 (A) 04/18/2016       TSH   Date Value Ref Range Status   10/24/2016 1.20 0.40 - 4.00 mU/L Final   10/12/2015 1.18 0.40 - 4.00 mU/L Final   08/21/2014 1.24 0.40 - 4.00 mU/L Final     Comment:     Effective 7/30/2014, the reference range for this assay has changed to reflect   new instrumentation/methodology.     08/05/2013 1.12 0.4 - 5.0 mU/L Final   07/15/2010 0.53 0.4 - 5.0 mU/L Final     T4 Total   Date Value Ref Range Status   10/30/2008 10.6 5.0 - 11.0 ug/dL Final     T4 Free   Date Value Ref Range Status   04/21/2006 1.04 0.70 - 1.85 ng/dL Final   09/23/2005 1.58 0.70 - 1.85 ng/dL Final       ALT   Date Value Ref Range Status   10/12/2015 39 0 - 50 U/L Final   03/06/2014 36 0 - 50 U/L Final   ]    Recent Labs   Lab Test  10/24/16   1301  10/12/15   1626  08/21/14   0935   05/10/12   1022   CHOL   --    --   110   --   144   HDL   --    --   43*   --   46*   LDL  48  51  43   < >  73   TRIG   --    --   122   --   123   CHOLHDLRATIO   --    --   2.6   --   3.1    < > = values in this interval not displayed.       Lab Results   Component  Value Date     10/24/2016      Lab Results   Component Value Date    POTASSIUM 4.7 10/24/2016     Lab Results   Component Value Date    CHLORIDE 107 10/24/2016     Lab Results   Component Value Date    OLAF 9.6 10/24/2016     Lab Results   Component Value Date    CO2 26 10/24/2016     Lab Results   Component Value Date    BUN 16 10/24/2016     Lab Results   Component Value Date    CR 0.99 10/24/2016       GFR Estimate   Date Value Ref Range Status   10/24/2016 56 (L) >60 mL/min/1.7m2 Final     Comment:     Non  GFR Calc   10/12/2015 65 >60 mL/min/1.7m2 Final     Comment:     Non  GFR Calc   09/24/2014 67 >60 mL/min/1.7m2 Final     Comment:     Non  GFR Calc     GFR Estimate If Black   Date Value Ref Range Status   10/24/2016 68 >60 mL/min/1.7m2 Final     Comment:      GFR Calc   10/12/2015 78 >60 mL/min/1.7m2 Final     Comment:      GFR Calc   09/24/2014 81 >60 mL/min/1.7m2 Final     Comment:      GFR Calc       Lab Results   Component Value Date    MICROL <5 10/12/2015     No results found for: MICROALBUMIN  No results found for: CPEPT, GADAB, ISCAB    Vitamin B12   Date Value Ref Range Status   08/05/2013 506 >210 pg/mL Final     Comment:     Interp: [id number] = Normal   ]    Most recent eye exam date: : 08/15/2017     Assessment/Plan:      1.  Type 2 diabetes-  Blood sugars are under good control.  Her a1c is stable at 7.1%.  I think we are at target and do not need very tight control (goal is a1c <8%) based on her risk factors and results of studies including the ACCORD trial.  Asked Jake Busch to call if she starts having hypoglycemia. No other changes today.  Could consider U-500 in the future, but it might put her at higher risk of hypoglycemia.  She is comfortable with her current plan now.      2.  Risk factors- BP good today.  No microalbuminuria.   On ACE inhibitor.  Creatinine ok.  LDL <100 on statin.   Follows with podiatry.  On gabapentin and cymbalta for neuropathy.  Will schedule with Dr. Grant.  She has labs done at her nursing home.  Requested creatinine, microalbumin, potassium and cholesterol.  They will send results to me.      3.  Watery eyes- may be related to seasonal allergies.  Will discuss with PCP.      4.  F/U in 3 months with me, sooner if she has any concerns.      I spent 30 minutes with this patient face to face and explained the conditions and plans (more than 50% of time was counseling/coordination of care, diabetes management, follow up plan for worsening hyper and hypoglycemia) . The patient understood and is satisfied with today's visit.     Renetta Washington PA-C, MPAS   [facility]  Department of Medicine  Division of Endocrinology and Diabetes

## 2018-03-16 NOTE — LETTER
3/16/2018     RE: Cristal Preciado  [facility]  [address]   [place] [address]     Dear Colleague,    Thank you for referring your patient, Cristal Preciado, to the [facility] ENDOCRINOLOGY at [facility]. Please see a copy of my visit note below.    HPI   Ms. Preciado returns for follow up of type 2 diabetes.  Blood sugars have been running mostly under 200 lately.  Her caretakers at [facility] have been testing her blood sugars 4 times a day.  She is currently taking Lantus 100 units in the morning and 100 units at night and Novolog 94 units at breakfast, 90 units with lunch and 74 with dinner plus sliding scale (NP at her nursing home raised her Novolog doses and implemented a more relaxed sliding scale of 2/50 over 200- she had previously been on 2/20 over 180 starting at 4 units).   She is also on liraglutide 1.8 mg daily and is tolerating this fine.  She is taking gabapentin for neuropathy.  She is also on Cymbalta.  Neuropathy in feet better, she will be getting new shoes.  She is seeing Dr. Grant for her feet.  She otherwise has been feeling well and in her usual state of health. She has no other concerns today.     ROS   GENERAL: Lost a few pounds.  No fevers, chills,  night sweats.  HEENT: +itchy, watery eyes disturbs her reading. +glaucoma.  no dysphagia, diplopia, neck pain or tenderness, dry/scratchy eyes, URI, cough, sinus drainage, tinnitus, sinus pressure.   CV: No CP, SOB.  No palpitations, skipped beats, LOC.  LUNGS: no cough, sputum production, wheezing   ABDOMEN: no diarrhea, constipation, abdominal pain  EXTREMITIES:  She has ongoing edema.  Hands and feet feel swollen.  Has thickened toenails, not cutting into skin.  No pain. She sees Dr. Grant.  Dry skin.  Leg ulcerations healed.  Callus on left heel.   NEUROLOGY: no changes in vision.  Continued tingling and numbness in hands and feet.   MSK: weakness in legs after  "stroke.  Needs help getting out of wheelchair. Does not use walker any more due to weakness.      PMH   Type 2 diabetes  Neuropathy   Nephropathy  Stroke - uses a walker, but mainly in wheelchair.   CAD, s/p stent   HTN  Dyslipidemia  Cataracts  Glaucoma  GERD    Family Hx:   Mom- stroke  Dad- cancer  1 of 12 children  2 brothers and 2 sisters-  cancer- unsure of type  1 sister with diabetes  5 children  Son- MS  Daughter- MS  6 grandchildren    Social Hx:   Ms. Preciado lives at [facility].  She keeps herself busy with many activities in the day, exercises (\"light and lively\"), crafts, coloring, baking, light bowling. She has many friends in their 90's there and she enjoys their company. She is seeing her family about 1-2 times a month now.     Has 5 children, all now living in [facility].  6 grandchildren.  4 grandchildren.  Originally from [place] [place].     Current Medications  Current Outpatient Prescriptions   Medication Sig Dispense Refill     ASPIRIN PO Take 81 mg by mouth daily       CHLORTHALIDONE PO Take 25 mg by mouth daily       DULoxetine HCl (CYMBALTA PO) Take 60 mg by mouth daily       ferrous sulfate (IRON) 325 (65 FE) MG tablet Take 325 mg by mouth daily (with breakfast)       insulin aspart (NOVOLOG VIAL) 100 UNITS/ML injection        Insulin Glargine (LANTUS SC) Inject 100 Units Subcutaneous 2 times daily       metoprolol (LOPRESSOR) 10 mg/mL SUSP Take 100 mg by mouth 2 times daily       clobetasol (TEMOVATE) 0.05 % external solution Apply topically 2 times daily To itchy and scaly areas of scalp as needed. 60 mL 3     Elastic Bandages & Supports (JOBST KNEE HIGH COMPRESSION SM) MISC JOBST 20-30MMHG COMPRESSION SM MISC   To both legs during waking hours daily for leg swelling and venous stasis dermatitis. Do not sleep in stockings. 2 each 3     insulin pen needle (B-D U/F) 31G X 5 MM Use 5 time(s) per day.  Please dispense as BD Pen Needle Mini U/F 31G x 5  each " 11     Pregabalin (LYRICA PO)        triamcinolone (KENALOG) 0.1 % ointment Apply topically daily To legs under compression stockings for stasis dermatitis discoloration. 60 g 5     Podiatric Products (AMLACTIN FOOT CREAM THERAPY EX) Externally apply 12 % topically       gabapentin (NEURONTIN) 600 MG tablet Take 600 mg in AM , 600 mg afternoon and 900 mg at HS. 90 tablet 1     dorzolamide-timolol (COSOPT) 2-0.5 % ophthalmic solution Place 1 drop into both eyes 2 times daily       ranitidine (ZANTAC) 150 MG tablet Take 150 mg by mouth 2 times daily       carboxymethylcellulose (REFRESH PLUS) 0.5 % SOLN 1 drop 3 times daily as needed.       sodium chloride (SODIUM CHLORIDE) 0.65 % nasal spray Spray 1 spray in nostril daily as needed.       latanoprost (XALATAN) 0.005 % ophthalmic solution 1 drop At Bedtime. Left eye       allopurinol (ZYLOPRIM) 100 MG tablet Take 2 tablets by mouth daily. 180 tablet 1     liraglutide (VICTOZA) 18 MG/3ML SOLN Inject 1.8 mg Subcutaneous daily. Start with 0.6 mg x 5 days, then increase to 1.2 mg x 5 days, then 1.8 mg thereafter.  Do not advance to higher dose if you have nausea. 3 Month 3     ibuprofen (ADVIL,MOTRIN) 400 MG tablet Take 400 mg by mouth every 6 hours as needed.       loratadine (CLARITIN) 10 MG tablet Take 10 mg by mouth as needed.       Calcium Citrate-Vitamin D (CALCIUM CITRATE + D PO) 600/400 mg/unit take 1 tabs daily twice daily.        nitroGLYCERIN (NITROSTAT) 0.4 MG SL tablet Place 0.4 mg under the tongue every 5 minutes as needed.       acetaminophen (TYLENOL) 500 MG tablet Take 1-2 tablets by mouth every 6 hours as needed. Tylenol Extra Strength.        atorvastatin (LIPITOR) 80 MG tablet Take 1 tablet by mouth daily. Pt needs to have labs done prior to further refills. 90 tablet 0     folic acid (FOLVITE) 1 MG tablet Take 1 mg by mouth daily.       ketoconazole (NIZORAL) 2 % shampoo To entire wet scalp and ears and then wash off after 5 minutes three times a  week. 240 mL 11     olopatadine HCl (PATADAY) 0.2 % SOLN Place 1 drop into both eyes daily as needed (Patient not taking: Reported on 3/16/2018) 1 Bottle 11     ketoconazole (NIZORAL) 2 % cream Apply topically 2 times daily To affected right ear mixed with triamcinolone ointment until return to clinic. 60 g 5     mometasone (ELOCON) 0.1 % cream Apply sparingly to left medial ankle area daily.  Do not apply to face. 45 g 0     lisinopril (PRINIVIL,ZESTRIL) 5 MG tablet Take 2 tablets by mouth daily. Take one tab daily/Hold for SBP < 110 (Patient not taking: Reported on 3/16/2018) 90 tablet 3       Physical Exam   /62 (BP Location: Right arm)  Pulse 80  GENERAL: VSS.  Answering questions appropriately, appears stated age. Sitting in wheelchair.   LUNGS: CTA bilaterally. No wheezes, rales, ronchi  EXTREMITIES: Legs with TYREL hose. 1+ edema bilaterally.    RESULTS  Lab Results   Component Value Date    A1C 7.5 (H) 10/24/2016    A1C 7.5 (H) 10/12/2015    A1C 8.1 (H) 03/06/2014    A1C 7.2 (H) 03/07/2013    A1C 7.7 (A) 02/13/2013    HEMOGLOBINA1 7.3 (A) 06/05/2017    HEMOGLOBINA1 7.3 (A) 06/05/2017    HEMOGLOBINA1 7.1 (A) 02/27/2017    HEMOGLOBINA1 8.3 (A) 07/20/2016    HEMOGLOBINA1 7.3 (A) 04/18/2016       TSH   Date Value Ref Range Status   10/24/2016 1.20 0.40 - 4.00 mU/L Final   10/12/2015 1.18 0.40 - 4.00 mU/L Final   08/21/2014 1.24 0.40 - 4.00 mU/L Final     Comment:     Effective 7/30/2014, the reference range for this assay has changed to reflect   new instrumentation/methodology.     08/05/2013 1.12 0.4 - 5.0 mU/L Final   07/15/2010 0.53 0.4 - 5.0 mU/L Final     T4 Total   Date Value Ref Range Status   10/30/2008 10.6 5.0 - 11.0 ug/dL Final     T4 Free   Date Value Ref Range Status   04/21/2006 1.04 0.70 - 1.85 ng/dL Final   09/23/2005 1.58 0.70 - 1.85 ng/dL Final       ALT   Date Value Ref Range Status   10/12/2015 39 0 - 50 U/L Final   03/06/2014 36 0 - 50 U/L Final   ]    Recent Labs   Lab Test   10/24/16   1301  10/12/15   1626  08/21/14   0935   05/10/12   1022   CHOL   --    --   110   --   144   HDL   --    --   43*   --   46*   LDL  48  51  43   < >  73   TRIG   --    --   122   --   123   CHOLHDLRATIO   --    --   2.6   --   3.1    < > = values in this interval not displayed.       Lab Results   Component Value Date     10/24/2016      Lab Results   Component Value Date    POTASSIUM 4.7 10/24/2016     Lab Results   Component Value Date    CHLORIDE 107 10/24/2016     Lab Results   Component Value Date    OLAF 9.6 10/24/2016     Lab Results   Component Value Date    CO2 26 10/24/2016     Lab Results   Component Value Date    BUN 16 10/24/2016     Lab Results   Component Value Date    CR 0.99 10/24/2016       GFR Estimate   Date Value Ref Range Status   10/24/2016 56 (L) >60 mL/min/1.7m2 Final     Comment:     Non  GFR Calc   10/12/2015 65 >60 mL/min/1.7m2 Final     Comment:     Non  GFR Calc   09/24/2014 67 >60 mL/min/1.7m2 Final     Comment:     Non  GFR Calc     GFR Estimate If Black   Date Value Ref Range Status   10/24/2016 68 >60 mL/min/1.7m2 Final     Comment:      GFR Calc   10/12/2015 78 >60 mL/min/1.7m2 Final     Comment:      GFR Calc   09/24/2014 81 >60 mL/min/1.7m2 Final     Comment:      GFR Calc       Lab Results   Component Value Date    MICROL <5 10/12/2015     No results found for: MICROALBUMIN  No results found for: CPEPT, GADAB, ISCAB    Vitamin B12   Date Value Ref Range Status   08/05/2013 506 >210 pg/mL Final     Comment:     Interp: [id number] = Normal       Most recent eye exam date: : 08/15/2017     Assessment/Plan:      1.  Type 2 diabetes-  Blood sugars are under good control.  Her a1c is stable at 7.1%.  I think we are at target and do not need very tight control (goal is a1c <8%) based on her risk factors and results of studies including the ACCORD trial.  Asked Good Narayan to call  if she starts having hypoglycemia. No other changes today.  Could consider U-500 in the future, but it might put her at higher risk of hypoglycemia.  She is comfortable with her current plan now.      2.  Risk factors- BP good today.  No microalbuminuria.   On ACE inhibitor.  Creatinine ok.  LDL <100 on statin.  Follows with podiatry.  On gabapentin and cymbalta for neuropathy.  Will schedule with Dr. Grant.  She has labs done at her nursing home.  Requested creatinine, microalbumin, potassium and cholesterol.  They will send results to me.      3.  Watery eyes- may be related to seasonal allergies.  Will discuss with PCP.      4.  F/U in 3 months with me, sooner if she has any concerns.      I spent 30 minutes with this patient face to face and explained the conditions and plans (more than 50% of time was counseling/coordination of care, diabetes management, follow up plan for worsening hyper and hypoglycemia) . The patient understood and is satisfied with today's visit.     Renetta Washington PA-C, MPAS   [facility]  Department of Medicine  Division of Endocrinology and Diabetes

## 2018-03-16 NOTE — MR AVS SNAPSHOT
After Visit Summary   3/16/2018    Cristal Preciado    MRN: [id number]           Patient Information     Date Of Birth          1950        Visit Information        Provider Department      3/16/2018 10:00 AM Renetta Washington PA-C [facility]        Today's Diagnoses     Well controlled type 2 diabetes mellitus (H)    -  1       Follow-ups after your visit        Your next 10 appointments already scheduled     Mar 22, 2018  1:30 PM CDT   (Arrive by 1:15 PM)   Return Visit with MIRTHA Chavez [facility] ([facility])    [address] [place] [id number] [phone]              Who to contact     Please call your clinic at [phone] to:    Ask questions about your health    Make or cancel appointments    Discuss your medicines    Learn about your test results    Speak to your doctor            Additional Information About Your Visit        MyChart Information     [employer] is an electronic gateway that provides easy, online access to your medical records. With tok tok tok, you can request a clinic appointment, read your test results, renew a prescription or communicate with your care team.     To sign up for [employer] visit the website at [email or web address]   You will be asked to enter the access code listed below, as well as some personal information. Please follow the directions to create your username and password.     Your access code is: [email or web address]  Expires: 2018 12:41 PM     Your access code will  in 90 days. If you need help or a new code, please contact your [facility] or call [phone] for assistance.        Care EveryWhere ID     This is your Care EveryWhere ID. This could be used by other organizations to access your [place] medical records  [id number]        Your Vitals Were     Pulse                   80            Blood Pressure from Last 3  Encounters:   03/16/18 131/62   06/05/17 124/51   02/27/17 138/84    Weight from Last 3 Encounters:   07/20/16 106.5 kg (234 lb 12.8 oz)   03/02/15 105.7 kg (233 lb)   03/06/14 111.6 kg (246 lb)              Today, you had the following     No orders found for display         Today's Medication Changes          These changes are accurate as of 3/16/18 10:52 AM.  If you have any questions, ask your nurse or doctor.               These medicines have changed or have updated prescriptions.        Dose/Directions    CHLORTHALIDONE PO   This may have changed:  Another medication with the same name was removed. Continue taking this medication, and follow the directions you see here.        Dose:  25 mg   Take 25 mg by mouth daily   Refills:  0       CYMBALTA PO   This may have changed:  Another medication with the same name was removed. Continue taking this medication, and follow the directions you see here.        Dose:  60 mg   Take 60 mg by mouth daily   Refills:  0       LANTUS SC   This may have changed:  Another medication with the same name was removed. Continue taking this medication, and follow the directions you see here.        Dose:  100 Units   Inject 100 Units Subcutaneous 2 times daily   Refills:  0       metoprolol 10 mg/mL Susp   Commonly known as:  LOPRESSOR   This may have changed:  Another medication with the same name was removed. Continue taking this medication, and follow the directions you see here.        Dose:  100 mg   Take 100 mg by mouth 2 times daily   Refills:  0       triamcinolone 0.1 % ointment   Commonly known as:  KENALOG   This may have changed:  Another medication with the same name was removed. Continue taking this medication, and follow the directions you see here.   Used for:  Venous stasis dermatitis of both lower extremities        Apply topically daily To legs under compression stockings for stasis dermatitis discoloration.   Quantity:  60 g   Refills:  5         Stop taking  these medicines if you haven't already. Please contact your care team if you have questions.     artificial tears Oint ophthalmic ointment           ARTIFICIAL TEARS OP           aspirin 325 MG tablet           calcium-vitamin D 500-125 MG-UNIT Tabs           clotrimazole 1 % cream   Commonly known as:  LOTRIMIN           HUMALOG KWIKPEN 200 UNIT/ML soln   Generic drug:  Insulin Lispro           hydrocortisone 1 % cream   Commonly known as:  CORTAID           hydrocortisone 2.5 % cream           MILK OF MAGNESIA PO           minocycline 100 MG capsule   Commonly known as:  MINOCIN/DYNACIN           omeprazole 20 MG CR capsule   Commonly known as:  priLOSEC           pimecrolimus 1 % cream   Commonly known as:  ELIDEL           PLAVIX 75 MG tablet   Generic drug:  clopidogrel           UNABLE TO FIND                    Primary Care Provider Office Phone # Fax #    Meagan CORNELIUS [place] [phone]       [facility] AFTER HOURS [address] [place] [address]        Equal Access to Services     [facility]IM[facility] : Hadii aad ku hadasho Soomaali, waaxda luqadaha, qaybta kaalmada adeegyada, waxay idiin hayaan adesundar amezcua . So [facility] [phone].    ATENCIÓN: Si habla español, tiene a reyes disposición servicios gratuitos de asistencia lingüística. Llame al [phone].    We comply with applicable federal civil rights laws and [place] laws. We do not discriminate on the basis of race, color, national origin, age, disability, sex, sexual orientation, or gender identity.            Thank you!     Thank you for choosing [facility]  for your care. Our goal is always to provide you with excellent care. Hearing back from our patients is one way we can continue to improve our services. Please take a few minutes to complete the written survey that you may receive in the mail after your visit with us. Thank you!             Your Updated Medication List - Protect others around you: Learn how to  safely use, store and throw away your medicines at www.disposemymeds.org.          This list is accurate as of 3/16/18 10:52 AM.  Always use your most recent med list.                   Brand Name Dispense Instructions for use Diagnosis    acetaminophen 500 MG tablet    TYLENOL     Take 1-2 tablets by mouth every 6 hours as needed. Tylenol Extra Strength.        allopurinol 100 MG tablet    ZYLOPRIM    180 tablet    Take 2 tablets by mouth daily.    Hyperuricemia       AMLACTIN FOOT CREAM THERAPY EX      Externally apply 12 % topically        ASPIRIN PO      Take 81 mg by mouth daily        atorvastatin 80 MG tablet    LIPITOR    90 tablet    Take 1 tablet by mouth daily. Pt needs to have labs done prior to further refills.    Other and unspecified hyperlipidemia       CALCIUM CITRATE + D PO      600/400 mg/unit take 1 tabs daily twice daily.        carboxymethylcellulose 0.5 % Soln ophthalmic solution    REFRESH PLUS     1 drop 3 times daily as needed.        CHLORTHALIDONE PO      Take 25 mg by mouth daily        CLARITIN 10 MG tablet   Generic drug:  loratadine      Take 10 mg by mouth as needed.        clobetasol 0.05 % external solution    TEMOVATE    60 mL    Apply topically 2 times daily To itchy and scaly areas of scalp as needed.    Dermatitis, seborrheic       CYMBALTA PO      Take 60 mg by mouth daily        dorzolamide-timolol 2-0.5 % ophthalmic solution    COSOPT     Place 1 drop into both eyes 2 times daily        ferrous sulfate 325 (65 FE) MG tablet    IRON     Take 325 mg by mouth daily (with breakfast)        folic acid 1 MG tablet    FOLVITE     Take 1 mg by mouth daily.        gabapentin 600 MG tablet    NEURONTIN    90 tablet    Take 600 mg in AM , 600 mg afternoon and 900 mg at HS.    Diabetic neuropathy (H)       ibuprofen 400 MG tablet    ADVIL/MOTRIN     Take 400 mg by mouth every 6 hours as needed.        insulin pen needle 31G X 5 MM    B-D U/F    150 each    Use 5 time(s) per day.  Please  dispense as BD Pen Needle Mini U/F 31G x 5 MM    Type 2 diabetes mellitus, controlled (H)       JOBST KNEE HIGH COMPRESSION SM Misc     2 each    JOBST 20-30MMHG COMPRESSION SM MISC  To both legs during waking hours daily for leg swelling and venous stasis dermatitis. Do not sleep in stockings.    Venous stasis dermatitis of both lower extremities       * ketoconazole 2 % cream    NIZORAL    60 g    Apply topically 2 times daily To affected right ear mixed with triamcinolone ointment until return to clinic.    Dermatitis       * ketoconazole 2 % shampoo    NIZORAL    240 mL    To entire wet scalp and ears and then wash off after 5 minutes three times a week.    Dermatitis, seborrheic       LANTUS SC      Inject 100 Units Subcutaneous 2 times daily        liraglutide 18 MG/3ML Soln    VICTOZA    3 Month    Inject 1.8 mg Subcutaneous daily. Start with 0.6 mg x 5 days, then increase to 1.2 mg x 5 days, then 1.8 mg thereafter.  Do not advance to higher dose if you have nausea.    Diabetes mellitus, type 2 (H)       lisinopril 5 MG tablet    PRINIVIL/ZESTRIL    90 tablet    Take 2 tablets by mouth daily. Take one tab daily/Hold for SBP < 110    Essential hypertension, benign       LYRICA PO           metoprolol 10 mg/mL Susp    LOPRESSOR     Take 100 mg by mouth 2 times daily        mometasone 0.1 % cream    ELOCON    45 g    Apply sparingly to left medial ankle area daily.  Do not apply to face.        nitroGLYcerin 0.4 MG sublingual tablet    NITROSTAT     Place 0.4 mg under the tongue every 5 minutes as needed.        NovoLOG VIAL 100 UNITS/ML injection   Generic drug:  insulin aspart           olopatadine HCl 0.2 % Soln    PATADAY    1 Bottle    Place 1 drop into both eyes daily as needed    History of itching of eye       ranitidine 150 MG tablet    ZANTAC     Take 150 mg by mouth 2 times daily        sodium chloride 0.65 % nasal spray    OCEAN     Spray 1 spray in nostril daily as needed.        triamcinolone 0.1 %  ointment    KENALOG    60 g    Apply topically daily To legs under compression stockings for stasis dermatitis discoloration.    Venous stasis dermatitis of both lower extremities       XALATAN 0.005 % ophthalmic solution   Generic drug:  latanoprost      1 drop At Bedtime. Left eye        * Notice:  This list has 2 medication(s) that are the same as other medications prescribed for you. Read the directions carefully, and ask your doctor or other care provider to review them with you.

## 2018-03-19 ENCOUNTER — RECORDS - HEALTHEAST (OUTPATIENT)
Dept: LAB | Facility: CLINIC | Age: 68
End: 2018-03-19

## 2018-03-19 LAB
CHOLEST SERPL-MCNC: 109 MG/DL
CHOLEST SERPL-MCNC: 109 MG/DL
CREAT SERPL-MCNC: 1.1 MG/DL (ref 0.5–1.1)
CREAT UR-MCNC: <5 MG/DL
GFR SERPL CREATININE-BSD FRML MDRD: 60 ML/MIN/1.73M2
MICROALBUMIN UR-MCNC: 1.84 MG/DL (ref 0–1.99)
MICROALBUMIN/CREAT UR: NORMAL MG/G
POTASSIUM BLD-SCNC: 4.5 MMOL/L (ref 3.5–5)
POTASSIUM SERPL-SCNC: 4.5 MMOL/L (ref 3.5–5)

## 2018-03-22 ENCOUNTER — OFFICE VISIT (OUTPATIENT)
Dept: ENDOCRINOLOGY | Facility: CLINIC | Age: 68
End: 2018-03-22
Payer: COMMERCIAL

## 2018-03-22 ENCOUNTER — MEDICAL CORRESPONDENCE (OUTPATIENT)
Dept: HEALTH INFORMATION MANAGEMENT | Facility: CLINIC | Age: 68
End: 2018-03-22

## 2018-03-22 DIAGNOSIS — B35.1 DERMATOPHYTOSIS OF NAIL: Primary | ICD-10-CM

## 2018-03-22 DIAGNOSIS — E11.49 TYPE II OR UNSPECIFIED TYPE DIABETES MELLITUS WITH NEUROLOGICAL MANIFESTATIONS, NOT STATED AS UNCONTROLLED(250.60) (H): ICD-10-CM

## 2018-03-22 NOTE — PROGRESS NOTES
Past Medical History:   Diagnosis Date     AION (anterior ischaemic optic neuropathy), left eye     NAION LE     CAD (coronary artery disease) 3/2009    [facility]; Angio 2013 UM- normal coronary arteries     Cataract      CVA (cerebral vascular accident) (H)     admitted at [facility]     on Cymbalta     Diabetes mellitus, type 2 (H)      Diabetic nephropathy (H)      Diabetic neuropathy (H)     severe     Diabetic retinopathy (H)      GERD (gastroesophageal reflux disease)      Hyperlipidemia      Hypertension     ECHO 2013, TDS, NL EF     POAG (primary open-angle glaucoma)     adv BE     Seasonal allergies      Tubular adenoma of colon 2013    repeat colonoscopy in 2018     Patient Active Problem List   Diagnosis     Cataract     CAD (coronary artery disease)     Diabetes mellitus, type 2 (H)     Diabetic nephropathy (H)     Diabetic neuropathy (H)     Diabetic retinopathy (H)     GERD (gastroesophageal reflux disease)     Glaucoma     Hypertension     Hyperlipidemia     CVA (cerebral vascular accident) (H)     Morbid obesity (H)     Anemia     Seasonal allergies     Depression     Tubular adenoma of colon     Leg weakness     Dermatitis, seborrheic     Eczematous dermatitis     History of coronary artery disease     Venous stasis dermatitis of both lower extremities     Uncontrolled type 2 diabetes mellitus (H)     Chronic dermatitis of hands     Neoplasm of uncertain behavior of skin     Past Surgical History:   Procedure Laterality Date     COLONOSCOPY  7/15/2013    Tubular adenoma; repeat in 2018;Procedure: COMBINED COLONOSCOPY, SINGLE BIOPSY/POLYPECTOMY BY BIOPSY;;  Surgeon: Don King MD;  Tubular adenoma     EXTRACAPSULAR CATARACT EXTRATION WITH INTRAOCULAR LENS IMPLANT  11-10-09, 2-9-10    11-10-09 Lt, 2-9-10 Rt; Left eye 8/2012     Social History     Social History     Marital status: Single     Spouse name: N/A     Number of children: N/A     Years of education: N/A      Occupational History     Not on file.     Social History Main Topics     Smoking status: Former Smoker     Quit date: 3/6/2009     Smokeless tobacco: Never Used     Alcohol use No     Drug use: No     Sexual activity: No     Other Topics Concern     Not on file     Social History Narrative    Pt has three daughters and two sons, single.  Her daughter Court, see's her often at the Nursing Home ([facility]).  Moved into Nursing Home in October 2011 after a hospitalization for ALY.  Moved from [place] in 2002 to [facility]. Has 5 grandchildren.     Family History   Problem Relation Age of Onset     Hypertension Mother      CEREBROVASCULAR DISEASE Mother      Glaucoma Father      CANCER Father      DIABETES Sister      Glaucoma Sister      Cancer - colorectal Other      CEREBROVASCULAR DISEASE Other      Skin Cancer No family hx of      Melanoma No family hx of      A1c                                              7.1                                                     3/16/2018  Creatinine   Date Value Ref Range Status   03/19/2018 1.10 0.5 - 1.10 mg/dL Final   ]SUBJECTIVE FINDINGS: A 67-year-old female returns to clinic for diabetic foot care and toenail care.  She relates she got diabetic shoes but they did not fit well so she is awaiting new ones.  No ulcers or sores since we have seen her last.  Presents in her wheelchair.       OBJECTIVE FINDINGS: DP and PT are 1/4 bilaterally.  She has decreased hair growth bilaterally.  She has minimal peripheral edema bilaterally.  She relates she is wearing her compression socks.  She has incurvated, thickened, brittle nails with subungual debris, dystrophy and discoloration 1, 2 and 5 bilaterally and to a lesser degree 3 and 4 bilaterally.  There is no erythema, no drainage, no odor, no calor bilaterally.  She has decreased ankle joint dorsiflexion bilaterally, deep tendon reflexes are intact, sharp/dull is decreased with 5.07 [place] Karthik monofilament  bilaterally.       ASSESSMENT AND PLAN: Onychomycosis bilaterally, onychauxis bilaterally.  She is diabetic with peripheral neuropathy and vascular disease.  She also has an equinus present.  Diagnosis and treatment options discussed with her.  All the nails were debrided or reduced bilaterally upon consent.  Six nails were debrided.  She will return to clinic and see me in 3 months.

## 2018-03-22 NOTE — LETTER
3/22/2018       RE: Cristal Preciado  [facility]  [address]   [place] [address]     Dear Colleague,    Thank you for referring your patient, Cristal Preciado, to the [facility] END[facility]OLOG[facility] at [facility]. Please see a copy of my visit note below.    Past Medical History:   Diagnosis Date     AION (anterior ischaemic optic neuropathy), left eye     NAION LE     CAD (coronary artery disease) 3/2009    [facility]; Angio 2013 UM- normal coronary arteries     Cataract      CVA (cerebral vascular accident) (H)     admitted at [facility]     Depression     on Cymbalta     Diabetes mellitus, type 2 (H)      Diabetic nephropathy (H)      Diabetic neuropathy (H)     severe     Diabetic retinopathy (H)      GERD (gastroesophageal reflux disease)      Hyperlipidemia      Hypertension     ECHO 2013, TDS, NL EF     POAG (primary open-angle glaucoma)     adv BE     Seasonal allergies      Tubular adenoma of colon 2013    repeat colonoscopy in 2018     Patient Active Problem List   Diagnosis     Cataract     CAD (coronary artery disease)     Diabetes mellitus, type 2 (H)     Diabetic nephropathy (H)     Diabetic neuropathy (H)     Diabetic retinopathy (H)     GERD (gastroesophageal reflux disease)     Glaucoma     Hypertension     Hyperlipidemia     CVA (cerebral vascular accident) (H)     Morbid obesity (H)     Anemia     Seasonal allergies     Depression     Tubular adenoma of colon     Leg weakness     Dermatitis, seborrheic     Eczematous dermatitis     History of coronary artery disease     Venous stasis dermatitis of both lower extremities     Uncontrolled type 2 diabetes mellitus (H)     Chronic dermatitis of hands     Neoplasm of uncertain behavior of skin     Past Surgical History:   Procedure Laterality Date     COLONOSCOPY  7/15/2013    Tubular adenoma; repeat in 2018;Procedure: COMBINED COLONOSCOPY, SINGLE BIOPSY/POLYPECTOMY BY BIOPSY;;  Surgeon:  Don King MD;  Tubular adenoma     EXTRACAPSULAR CATARACT EXTRATION WITH INTRAOCULAR LENS IMPLANT  11-10-09, 2-9-10    11-10-09 Lt, 2-9-10 Rt; Left eye 8/2012     Social History     Social History     Marital status: Single     Spouse name: N/A     Number of children: N/A     Years of education: N/A     Occupational History     Not on file.     Social History Main Topics     Smoking status: Former Smoker     Quit date: 3/6/2009     Smokeless tobacco: Never Used     Alcohol use No     Drug use: No     Sexual activity: No     Other Topics Concern     Not on file     Social History Narrative    Pt has three daughters and two sons, single.  Her daughter Court, see's her often at the Nursing Home ([facility]).  Moved into Nursing Home in October 2011 after a hospitalization for ALY.  Moved from [place] in 2002 to [place]. Has 5 grandchildren.     Family History   Problem Relation Age of Onset     Hypertension Mother      CEREBROVASCULAR DISEASE Mother      Glaucoma Father      CANCER Father      DIABETES Sister      Glaucoma Sister      Cancer - colorectal Other      CEREBROVASCULAR DISEASE Other      Skin Cancer No family hx of      Melanoma No family hx of      A1c                                              7.1                                                     3/16/2018  Creatinine   Date Value Ref Range Status   03/19/2018 1.10 0.5 - 1.10 mg/dL Final   ]SUBJECTIVE FINDINGS: A 67-year-old female returns to clinic for diabetic foot care and toenail care.  She relates she got diabetic shoes but they did not fit well so she is awaiting new ones.  No ulcers or sores since we have seen her last.  Presents in her wheelchair.       OBJECTIVE FINDINGS: DP and PT are 1/4 bilaterally.  She has decreased hair growth bilaterally.  She has minimal peripheral edema bilaterally.  She relates she is wearing her compression socks.  She has incurvated, thickened, brittle nails with subungual debris, dystrophy  and discoloration 1, 2 and 5 bilaterally and to a lesser degree 3 and 4 bilaterally.  There is no erythema, no drainage, no odor, no calor bilaterally.  She has decreased ankle joint dorsiflexion bilaterally, deep tendon reflexes are intact, sharp/dull is decreased with 5.07 [place] Karthik monofilament bilaterally.       ASSESSMENT AND PLAN: Onychomycosis bilaterally, onychauxis bilaterally.  She is diabetic with peripheral neuropathy and vascular disease.  She also has an equinus present.  Diagnosis and treatment options discussed with her.  All the nails were debrided or reduced bilaterally upon consent.  Six nails were debrided.  She will return to clinic and see me in 3 months.       Again, thank you for allowing me to participate in the care of your patient.      Sincerely,    Vu Grant DPM

## 2018-03-22 NOTE — MR AVS SNAPSHOT
After Visit Summary   3/22/2018    Cristal Preciado    MRN: [id number]           Patient Information     Date Of Birth          1950        Visit Information        Provider Department      3/22/2018 1:30 PM Vu Grant DPM [facility]        Today's Diagnoses     Dermatophytosis of nail    -  1    Type II or unspecified type diabetes mellitus with neurological manifestations, not stated as uncontrolled(250.60) (H)           Follow-ups after your visit        Your next 10 appointments already scheduled     2018  1:30 PM CDT   (Arrive by 1:15 PM)   RETURN DIABETES with JODY Tapia [facility] ([facility] [place])    [address] [address] [phone]              Who to contact     Please call your clinic at [phone] to:    Ask questions about your health    Make or cancel appointments    Discuss your medicines    Learn about your test results    Speak to your doctor            Additional Information About Your Visit        MyChart Information     HouseFix is an electronic gateway that provides easy, online access to your medical records. With HouseFix, you can request a clinic appointment, read your test results, renew a prescription or communicate with your care team.     To sign up for Miscotat visit the website at [email or web address]   You will be asked to enter the access code listed below, as well as some personal information. Please follow the directions to create your username and password.     Your access code is: [email or web address]  Expires: 2018 12:41 PM     Your access code will  in 90 days. If you need help or a new code, please contact your [facility] or call [phone] for assistance.        Care EveryWhere ID     This is your Care EveryWhere ID. This could be used by other organizations to access your [facility]  records  [id number]         Blood Pressure from Last 3 Encounters:   03/16/18 131/62   06/05/17 124/51   02/27/17 138/84    Weight from Last 3 Encounters:   07/20/16 106.5 kg (234 lb 12.8 oz)   03/02/15 105.7 kg (233 lb)   03/06/14 111.6 kg (246 lb)              We Performed the Following     DEBRIDEMENT OF NAILS, 6 OR MORE        Primary Care Provider Office Phone # Fax #    Meagan Valdez [phone]       [facility] AFTER HOURS [address] [place] [address]        Equal Access to Services     [facility]: Hadii aad ku hadasho Soomaali, waaxda luqadaha, qaybta kaalmada adeegyada, camilla amezcua . So [facility] [phone].    ATENCIÓN: Si habla español, tiene a reyes disposición servicios gratuitos de asistencia lingüística. Ll[facility] [phone].    We comply with applicable federal civil rights laws and [place] laws. We do not discriminate on the basis of race, color, national origin, age, disability, sex, sexual orientation, or gender identity.            Thank you!     Thank you for choosing [facility]  for your care. Our goal is always to provide you with excellent care. Hearing back from our patients is one way we can continue to improve our services. Please take a few minutes to complete the written survey that you may receive in the mail after your visit with us. Thank you!             Your Updated Medication List - Protect others around you: Learn how to safely use, store and throw away your medicines at www.disposemymeds.org.          This list is accurate as of 3/22/18  1:41 PM.  Always use your most recent med list.                   Brand Name Dispense Instructions for use Diagnosis    acetaminophen 500 MG tablet    TYLENOL     Take 1-2 tablets by mouth every 6 hours as needed. Tylenol Extra Strength.        allopurinol 100 MG tablet    ZYLOPRIM    180 tablet    Take 2 tablets by mouth daily.    Hyperuricemia       AMLACTIN FOOT CREAM  THERAPY EX      Externally apply 12 % topically        ASPIRIN PO      Take 81 mg by mouth daily        atorvastatin 80 MG tablet    LIPITOR    90 tablet    Take 1 tablet by mouth daily. Pt needs to have labs done prior to further refills.    Other and unspecified hyperlipidemia       CALCIUM CITRATE + D PO      600/400 mg/unit take 1 tabs daily twice daily.        carboxymethylcellulose 0.5 % Soln ophthalmic solution    REFRESH PLUS     1 drop 3 times daily as needed.        CHLORTHALIDONE PO      Take 25 mg by mouth daily        CLARITIN 10 MG tablet   Generic drug:  loratadine      Take 10 mg by mouth as needed.        clobetasol 0.05 % external solution    TEMOVATE    60 mL    Apply topically 2 times daily To itchy and scaly areas of scalp as needed.    Dermatitis, seborrheic       CYMBALTA PO      Take 60 mg by mouth daily        dorzolamide-timolol 2-0.5 % ophthalmic solution    COSOPT     Place 1 drop into both eyes 2 times daily        ferrous sulfate 325 (65 FE) MG tablet    IRON     Take 325 mg by mouth daily (with breakfast)        folic acid 1 MG tablet    FOLVITE     Take 1 mg by mouth daily.        gabapentin 600 MG tablet    NEURONTIN    90 tablet    Take 600 mg in AM , 600 mg afternoon and 900 mg at HS.    Diabetic neuropathy (H)       ibuprofen 400 MG tablet    ADVIL/MOTRIN     Take 400 mg by mouth every 6 hours as needed.        insulin pen needle 31G X 5 MM    B-D U/F    150 each    Use 5 time(s) per day.  Please dispense as BD Pen Needle Mini U/F 31G x 5 MM    Type 2 diabetes mellitus, controlled (H)       JOBST KNEE HIGH COMPRESSION SM Misc     2 each    JOBST 20-30MMHG COMPRESSION SM MISC  To both legs during waking hours daily for leg swelling and venous stasis dermatitis. Do not sleep in stockings.    Venous stasis dermatitis of both lower extremities       * ketoconazole 2 % cream    NIZORAL    60 g    Apply topically 2 times daily To affected right ear mixed with triamcinolone ointment  until return to clinic.    Dermatitis       * ketoconazole 2 % shampoo    NIZORAL    240 mL    To entire wet scalp and ears and then wash off after 5 minutes three times a week.    Dermatitis, seborrheic       LANTUS SC      Inject 100 Units Subcutaneous 2 times daily        liraglutide 18 MG/3ML Soln    VICTOZA    3 Month    Inject 1.8 mg Subcutaneous daily. Start with 0.6 mg x 5 days, then increase to 1.2 mg x 5 days, then 1.8 mg thereafter.  Do not advance to higher dose if you have nausea.    Diabetes mellitus, type 2 (H)       lisinopril 5 MG tablet    PRINIVIL/ZESTRIL    90 tablet    Take 2 tablets by mouth daily. Take one tab daily/Hold for SBP < 110    Essential hypertension, benign       LYRICA PO           metoprolol 10 mg/mL Susp    LOPRESSOR     Take 100 mg by mouth 2 times daily        mometasone 0.1 % cream    ELOCON    45 g    Apply sparingly to left medial ankle area daily.  Do not apply to face.        nitroGLYcerin 0.4 MG sublingual tablet    NITROSTAT     Place 0.4 mg under the tongue every 5 minutes as needed.        NovoLOG VIAL 100 UNITS/ML injection   Generic drug:  insulin aspart           olopatadine HCl 0.2 % Soln    PATADAY    1 Bottle    Place 1 drop into both eyes daily as needed    History of itching of eye       ranitidine 150 MG tablet    ZANTAC     Take 150 mg by mouth 2 times daily        sodium chloride 0.65 % nasal spray    OCEAN     Spray 1 spray in nostril daily as needed.        triamcinolone 0.1 % ointment    KENALOG    60 g    Apply topically daily To legs under compression stockings for stasis dermatitis discoloration.    Venous stasis dermatitis of both lower extremities       XALATAN 0.005 % ophthalmic solution   Generic drug:  latanoprost      1 drop At Bedtime. Left eye        * Notice:  This list has 2 medication(s) that are the same as other medications prescribed for you. Read the directions carefully, and ask your doctor or other care provider to review them with  you.

## 2018-03-29 ENCOUNTER — TELEPHONE (OUTPATIENT)
Dept: OPHTHALMOLOGY | Facility: CLINIC | Age: 68
End: 2018-03-29

## 2018-03-29 NOTE — TELEPHONE ENCOUNTER
----- Message from DELMY Perez sent at 3/28/2018  4:22 PM CDT -----  Regarding: FW: Pt needs to reschedule Dr. Delong appt  Contact: [phone]  Nurse station closed at 4.  No answer when transferred to nurse station  ----- Message -----     From: Sawyer Hernandez     Sent: 3/28/2018   1:31 PM       To: Nissa Grace, [facility]  Subject: Pt needs to reschedule Dr. Delong appt           Pt didn't realize until today that her appt with Dr. Delong for today was cancelled and she needs to reschedule. Please call Rosalva at [facility] at [phone] or [phone] to schedule.    Thank you,    Sawyer Hernandez  Call Center    Please DO NOT send this message and/or reply back to sender. Call Center Representatives DO NOT respond to messages.

## 2018-03-29 NOTE — TELEPHONE ENCOUNTER
Rescheduled bumped appt to April 30th at 2:30 PM 9th floor PWB  Care center aware of date/time/location  Glen Willard RN 3:31 PM 03/29/18

## 2018-04-04 ENCOUNTER — OFFICE VISIT - HEALTHEAST (OUTPATIENT)
Dept: GERIATRICS | Facility: CLINIC | Age: 68
End: 2018-04-04

## 2018-04-04 DIAGNOSIS — E11.42 DIABETIC PERIPHERAL NEUROPATHY ASSOCIATED WITH TYPE 2 DIABETES MELLITUS (H): ICD-10-CM

## 2018-04-04 DIAGNOSIS — E11.9 TYPE 2 DIABETES MELLITUS (H): ICD-10-CM

## 2018-04-04 DIAGNOSIS — N18.2 STAGE 2 CHRONIC KIDNEY DISEASE: ICD-10-CM

## 2018-04-04 DIAGNOSIS — I10 ESSENTIAL HYPERTENSION: ICD-10-CM

## 2018-04-23 ENCOUNTER — TRANSFERRED RECORDS (OUTPATIENT)
Dept: HEALTH INFORMATION MANAGEMENT | Facility: CLINIC | Age: 68
End: 2018-04-23

## 2018-04-25 ENCOUNTER — TRANSFERRED RECORDS (OUTPATIENT)
Dept: HEALTH INFORMATION MANAGEMENT | Facility: CLINIC | Age: 68
End: 2018-04-25

## 2018-04-30 ENCOUNTER — OFFICE VISIT (OUTPATIENT)
Dept: OPHTHALMOLOGY | Facility: CLINIC | Age: 68
End: 2018-04-30
Attending: OPHTHALMOLOGY
Payer: COMMERCIAL

## 2018-04-30 DIAGNOSIS — H40.1133 PRIMARY OPEN ANGLE GLAUCOMA OF BOTH EYES, SEVERE STAGE: Primary | ICD-10-CM

## 2018-04-30 PROCEDURE — G0463 HOSPITAL OUTPT CLINIC VISIT: HCPCS | Mod: ZF

## 2018-04-30 ASSESSMENT — CONF VISUAL FIELD
OS_SUPERIOR_TEMPORAL_RESTRICTION: 3
OS_INFERIOR_TEMPORAL_RESTRICTION: 3
METHOD: COUNTING FINGERS
OD_NORMAL: 1
OS_SUPERIOR_NASAL_RESTRICTION: 3
OS_INFERIOR_NASAL_RESTRICTION: 3

## 2018-04-30 ASSESSMENT — REFRACTION_WEARINGRX
OS_ADD: +2.50
OD_SPHERE: -1.25
OD_ADD: +2.50
OD_AXIS: 168
SPECS_TYPE: BIFOCAL
OS_CYLINDER: +0.75
OS_SPHERE: -0.50
OS_AXIS: 025
OD_CYLINDER: +0.75

## 2018-04-30 ASSESSMENT — VISUAL ACUITY
OD_CC: 20/30
METHOD: SNELLEN - LINEAR
OS_CC: 20/60
OD_CC+: -2
OD_PH_CC: 20/25-3

## 2018-04-30 ASSESSMENT — CUP TO DISC RATIO
OD_RATIO: 0.8
OS_RATIO: 0.8

## 2018-04-30 ASSESSMENT — SLIT LAMP EXAM - LIDS
COMMENTS: NORMAL
COMMENTS: NORMAL

## 2018-04-30 ASSESSMENT — TONOMETRY
OD_IOP_MMHG: 18
OS_IOP_MMHG: 18
IOP_METHOD: TONOPEN

## 2018-04-30 NOTE — PROGRESS NOTES
Primary open angle glaucoma (POAG) adv both eyes   [place] both eyes    Right eye 9/05   Left eye 5/04  NAION left eye  Cataract extraction with intraocular lens    Right eye 2-9-10   Left eye 8-14-12  Diabetes mellitus-no retinopathy today    Current medications:    Latanoprost both eyes at bedtime   Cosopt both eyes twice a day     RV 6 months intraocular pressure check to make sure tubes still covered (covered with conjunctiva only)  Cannot do visual field  Add Pataday as needed for allergy    Attending Physician Attestation:  Complete documentation of historical and exam elements from today's encounter can be found in the full encounter summary report (not reduplicated in this progress note). I personally obtained the chief complaint(s) and history of present illness. I confirmed and edited asnecessary the review of systems, past medical/surgical history, family history, social history, and examination findings as documented by others; and I examined the patient myself. I personally reviewed the relevant tests, images, and reports as documented above. I formulated and edited as necessary the assessment and plan and discussed the findings and management plan with the patient and family.  - Bette Delong MD 3:30 PM 4/30/2018

## 2018-04-30 NOTE — NURSING NOTE
Chief Complaints and History of Present Illnesses   Patient presents with     Follow Up For     POAG w/ history of young VILLELA    Last Eye Exam:  8/10/16   Affected eye(s):  Both   Symptoms:        Unknown duration    Frequency:  Constant       Do you have eye pain now?:  No      Comments:  Cristal is here today for a follow up of POAG, along with itchy, watery eyes times 1 month   She says her vision is blurry because her eyes water a lot.  She has new glasses that help her vision she says. They are about three months old. She is also having a dilated exam today as well.    Lab Results       Component                Value               Date                       A1C                      7.5                 10/24/2016                 A1C                      7.5                 10/12/2015                 A1C                      8.1                 03/06/2014                 A1C                      7.2                 03/07/2013                 A1C                      7.7                 02/13/2013            Bs today around 277    Elliott Segura COT 2:44 PM April 30, 2018

## 2018-04-30 NOTE — MR AVS SNAPSHOT
After Visit Summary   2018    Cristal Preciado    MRN: [id number]           Patient Information     Date Of Birth          1950        Visit Information        Provider Department      2018 2:15 PM Bette Delong MD [facility]        Today's Diagnoses     Primary open angle glaucoma of both eyes, severe stage [H40.11X3]    -  1       Follow-ups after your visit        Follow-up notes from your care team     Return in about 6 months (around 10/30/2018) for iop check.      Your next 10 appointments already scheduled     2018  1:30 PM CDT   (Arrive by 1:15 PM)   RETURN DIABETES with Renetta Washington PA-C   [facility] ([facility] [place])    [address] [address] [phone]            2018  1:45 PM CDT   (Arrive by 1:30 PM)   Return Visit with Vu Grant DPM   [facility] ([facility] [place])    [address] [address] [phone]              Who to contact     Please call your clinic at [phone] to:    Ask questions about your health    Make or cancel appointments    Discuss your medicines    Learn about your test results    Speak to your doctor            Additional Information About Your Visit        MyChart Information     Rdio is an electronic gateway that provides easy, online access to your medical records. With Rdio, you can request a clinic appointment, read your test results, renew a prescription or communicate with your care team.     To sign up for Context Matterst visit the website at [email or web address]   You will be asked to enter the access code listed below, as well as some personal information. Please follow the directions to create your username and password.     Your access code is: [email or web address]  Expires: 2018 12:41 PM     Your access code will  in 90 days. If you need help or a new code, please  contact your [facility] or call [phone] for assistance.        Care EveryWhere ID     This is your Care EveryWhere ID. This could be used by other organizations to access your [facility] medical records  [id number]         Blood Pressure from Last 3 Encounters:   03/16/18 131/62   06/05/17 124/51   02/27/17 138/84    Weight from Last 3 Encounters:   07/20/16 106.5 kg (234 lb 12.8 oz)   03/02/15 105.7 kg (233 lb)   03/06/14 111.6 kg (246 lb)              Today, you had the following     No orders found for display       Primary Care Provider Office Phone # Fax #    Meagan Valdez [phone]       [facility] AFTER HOURS [address] [place] [address]        Equal Access to Services     PASCALE [facility] : Hadii hilario prieto hadasho Soomaali, waaxda luqadaha, qaybta kaalmada adeegyada, camilla amezcua . So [facility] [phone].    ATENCIÓN: Si habla español, tiene a reyes disposición servicios gratuitos de asistencia lingüística. Pierre al [phone].    We comply with applicable federal civil rights laws and [place] laws. We do not discriminate on the basis of race, color, national origin, age, disability, sex, sexual orientation, or gender identity.            Thank you!     Thank you for choosing [facility]  for your care. Our goal is always to provide you with excellent care. Hearing back from our patients is one way we can continue to improve our services. Please take a few minutes to complete the written survey that you may receive in the mail after your visit with us. Thank you!             Your Updated Medication List - Protect others around you: Learn how to safely use, store and throw away your medicines at www.disposemymeds.org.          This list is accurate as of 4/30/18  3:33 PM.  Always use your most recent med list.                   Brand Name Dispense Instructions for use Diagnosis    acetaminophen 500 MG tablet    TYLENOL      Take 1-2 tablets by mouth every 6 hours as needed. Tylenol Extra Strength.        allopurinol 100 MG tablet    ZYLOPRIM    180 tablet    Take 2 tablets by mouth daily.    Hyperuricemia       AMLACTIN FOOT CREAM THERAPY EX      Externally apply 12 % topically        ASPIRIN PO      Take 81 mg by mouth daily        atorvastatin 80 MG tablet    LIPITOR    90 tablet    Take 1 tablet by mouth daily. Pt needs to have labs done prior to further refills.    Other and unspecified hyperlipidemia       CALCIUM CITRATE + D PO      600/400 mg/unit take 1 tabs daily twice daily.        carboxymethylcellulose 0.5 % Soln ophthalmic solution    REFRESH PLUS     1 drop 3 times daily as needed.        CHLORTHALIDONE PO      Take 25 mg by mouth daily        CLARITIN 10 MG tablet   Generic drug:  loratadine      Take 10 mg by mouth as needed.        clobetasol 0.05 % external solution    TEMOVATE    60 mL    Apply topically 2 times daily To itchy and scaly areas of scalp as needed.    Dermatitis, seborrheic       CYMBALTA PO      Take 60 mg by mouth daily        dorzolamide-timolol 2-0.5 % ophthalmic solution    COSOPT     Place 1 drop into both eyes 2 times daily        ferrous sulfate 325 (65 Fe) MG tablet    IRON     Take 325 mg by mouth daily (with breakfast)        folic acid 1 MG tablet    FOLVITE     Take 1 mg by mouth daily.        gabapentin 600 MG tablet    NEURONTIN    90 tablet    Take 600 mg in AM , 600 mg afternoon and 900 mg at HS.    Diabetic neuropathy (H)       ibuprofen 400 MG tablet    ADVIL/MOTRIN     Take 400 mg by mouth every 6 hours as needed.        insulin pen needle 31G X 5 MM    B-D U/F    150 each    Use 5 time(s) per day.  Please dispense as BD Pen Needle Mini U/F 31G x 5 MM    Type 2 diabetes mellitus, controlled (H)       JOBST KNEE HIGH COMPRESSION SM Misc     2 each    JOBST 20-30MMHG COMPRESSION SM MISC  To both legs during waking hours daily for leg swelling and venous stasis dermatitis. Do not sleep  in stockings.    Venous stasis dermatitis of both lower extremities       * ketoconazole 2 % cream    NIZORAL    60 g    Apply topically 2 times daily To affected right ear mixed with triamcinolone ointment until return to clinic.    Dermatitis       * ketoconazole 2 % shampoo    NIZORAL    240 mL    To entire wet scalp and ears and then wash off after 5 minutes three times a week.    Dermatitis, seborrheic       LANTUS SC      Inject 100 Units Subcutaneous 2 times daily        liraglutide 18 MG/3ML Soln    VICTOZA    3 Month    Inject 1.8 mg Subcutaneous daily. Start with 0.6 mg x 5 days, then increase to 1.2 mg x 5 days, then 1.8 mg thereafter.  Do not advance to higher dose if you have nausea.    Diabetes mellitus, type 2 (H)       lisinopril 5 MG tablet    PRINIVIL/ZESTRIL    90 tablet    Take 2 tablets by mouth daily. Take one tab daily/Hold for SBP < 110    Essential hypertension, benign       LYRICA PO           metoprolol 10 mg/mL Susp    LOPRESSOR     Take 100 mg by mouth 2 times daily        mometasone 0.1 % cream    ELOCON    45 g    Apply sparingly to left medial ankle area daily.  Do not apply to face.        nitroGLYcerin 0.4 MG sublingual tablet    NITROSTAT     Place 0.4 mg under the tongue every 5 minutes as needed.        NovoLOG VIAL 100 UNITS/ML injection   Generic drug:  insulin aspart           olopatadine HCl 0.2 % Soln    PATADAY    1 Bottle    Place 1 drop into both eyes daily as needed    History of itching of eye       ranitidine 150 MG tablet    ZANTAC     Take 150 mg by mouth 2 times daily        sodium chloride 0.65 % nasal spray    OCEAN     Spray 1 spray in nostril daily as needed.        triamcinolone 0.1 % ointment    KENALOG    60 g    Apply topically daily To legs under compression stockings for stasis dermatitis discoloration.    Venous stasis dermatitis of both lower extremities       XALATAN 0.005 % ophthalmic solution   Generic drug:  latanoprost      1 drop At Bedtime. Left  eye        * Notice:  This list has 2 medication(s) that are the same as other medications prescribed for you. Read the directions carefully, and ask your doctor or other care provider to review them with you.

## 2018-05-01 ENCOUNTER — TELEPHONE (OUTPATIENT)
Dept: OPHTHALMOLOGY | Facility: CLINIC | Age: 68
End: 2018-05-01

## 2018-05-01 DIAGNOSIS — Z86.69 HISTORY OF ITCHING OF EYE: ICD-10-CM

## 2018-05-01 RX ORDER — OLOPATADINE HYDROCHLORIDE 2 MG/ML
1 SOLUTION/ DROPS OPHTHALMIC DAILY PRN
Qty: 1 BOTTLE | Refills: 5 | Status: SHIPPED | OUTPATIENT
Start: 2018-05-01 | End: 2022-01-01

## 2018-05-01 NOTE — TELEPHONE ENCOUNTER
Health Call Center    Phone Message    May a detailed message be left on voicemail: yes    Reason for Call: Medication Question or concern regarding medication   Prescription Clarification  Name of Medication: olopatadine HCl (PATADAY) 0.2 % SOLN  Prescribing Provider: Bette Delong   [facility] [place], [place] - [address]   What on the order needs clarification? How many drops of this Rx needs to be given.       Action Taken: Message routed to:  [facility] ([facility]): [facility] adult [facility]

## 2018-05-01 NOTE — TELEPHONE ENCOUNTER
Rx for pataday sent to pt's pharmacy   once daily PRN itchy eyes  Glen Willard RN 10:39 AM 05/01/18

## 2018-05-02 ENCOUNTER — TELEPHONE (OUTPATIENT)
Dept: OPHTHALMOLOGY | Facility: CLINIC | Age: 68
End: 2018-05-02

## 2018-05-02 NOTE — TELEPHONE ENCOUNTER
[facility]        Phone Number: [phone]                     The pt's nurse at [facility] is calling to qualify specifically: How many drops of olopatadine HCl (PATADAY) 0.2 % SOLN is to be given daily in which eyes daily. Please call the pt's nurse at [facility] at [phone], or fax complete rx to fax # [phone].         --  Message above received by triage at 1416  Reviewed one drop each eye daily as needed for itching  Glen Willard RN 2:54 PM 05/02/18

## 2018-05-16 ENCOUNTER — TELEPHONE (OUTPATIENT)
Dept: ENDOCRINOLOGY | Facility: CLINIC | Age: 68
End: 2018-05-16

## 2018-05-16 NOTE — TELEPHONE ENCOUNTER
[facility] Call Center    Phone Message    May a detailed message be left on voicemail: no    Reason for Call: Other: Rosalva from [facility] where pt resides would like a call back to discuss pt's elevated blood sugars, she can be reached at [phone].      Action Taken: Message routed to:  [facility] ([facility]): Endo

## 2018-05-16 NOTE — TELEPHONE ENCOUNTER
Spoke w/ Pat at [facility] - came back from hospital on sliding scale only - lasted for one day - went back on scheduled novolog and lantus - decrease lantus from 100 BID to 75 BID, back on novolog as ordered, ad also sliding scale, sent pens from hospital, sugars have gone up on pens, back on vials as of today, also went to [facility] and got heavy Carb snacks, eating a lot of extra simple carbs (candy, soda, cakes)daily - will fax over BS results, not showing symptoms, getting her nails, done, etc,... They're just concerned about elevated results.  Forwarded to JAZMYN Washington

## 2018-05-17 ENCOUNTER — OFFICE VISIT - HEALTHEAST (OUTPATIENT)
Dept: GERIATRICS | Facility: CLINIC | Age: 68
End: 2018-05-17

## 2018-05-17 DIAGNOSIS — G62.9 NEUROPATHY: ICD-10-CM

## 2018-05-17 DIAGNOSIS — R07.9 CHEST PAIN: ICD-10-CM

## 2018-05-17 DIAGNOSIS — I10 ESSENTIAL HYPERTENSION: ICD-10-CM

## 2018-05-24 ENCOUNTER — TELEPHONE (OUTPATIENT)
Dept: ENDOCRINOLOGY | Facility: CLINIC | Age: 68
End: 2018-05-24

## 2018-05-24 DIAGNOSIS — E11.9 TYPE 2 DIABETES MELLITUS (H): Primary | ICD-10-CM

## 2018-05-24 RX ORDER — INSULIN GLARGINE 100 [IU]/ML
INJECTION, SOLUTION SUBCUTANEOUS
Qty: 60 ML | Refills: 11 | Status: SHIPPED | OUTPATIENT
Start: 2018-05-24 | End: 2018-06-26 | Stop reason: ALTCHOICE

## 2018-05-24 NOTE — TELEPHONE ENCOUNTER
Unable to reach staff by phone at [facility].  I left a message to expect new insulin orders and faxed them to [phone] . They will call when received.

## 2018-05-24 NOTE — TELEPHONE ENCOUNTER
----- Message from Renetta Washington PA-C sent at 5/24/2018  7:56 AM CDT -----  Regarding: RE: BG/FYI  I called Evie at [facility].  Cristal's blood sugars are very high.  She was hospitalized at [facility] in April and her Lantus dose was reduced from 100 units bid to 75 units bid. She has been running in the upper 200-300 range.     Will increase Lantus to 90 units bid and call if she is still having more than 3 readings over 300 in a week.     Renetta Washington PA-C    ----- Message -----     From: Tamara Chandler RN     Sent: 5/23/2018   2:38 PM       To: Renetta Washington PA-C  Subject: BG/FYI                                           [facility] [phone]    Current insulin Lantus 75 units BID  Novolog 96 units 7:30 am, 90 units at 11:30am, 76 units 4:30 pm + sliding scales with meals.   +  Victoza 1.8 mg Qam    They faxed BG with MAR and its in your mailbox.

## 2018-05-24 NOTE — TELEPHONE ENCOUNTER
I reached Evie who tells me the fax was received for  Increased dose of  lantus to  90 units BID. She also spoke with Renetta Washington this morning. They need to call if she is having  More than 3 readings over 300 in a week.

## 2018-05-29 ENCOUNTER — TELEPHONE (OUTPATIENT)
Dept: ENDOCRINOLOGY | Facility: CLINIC | Age: 68
End: 2018-05-29

## 2018-05-29 NOTE — TELEPHONE ENCOUNTER
[facility] Call Center    Phone Message    May a detailed message be left on voicemail: yes    Reason for Call: Other: Evie Kumar is Faxing over patient's flow sheet with her blood sugar levels.     Action Taken: Message routed to:  [facility] ([facility]): Endo

## 2018-06-06 ENCOUNTER — OFFICE VISIT - HEALTHEAST (OUTPATIENT)
Dept: GERIATRICS | Facility: CLINIC | Age: 68
End: 2018-06-06

## 2018-06-06 DIAGNOSIS — N18.2 STAGE 2 CHRONIC KIDNEY DISEASE: ICD-10-CM

## 2018-06-06 DIAGNOSIS — Z79.4 TYPE 2 DIABETES MELLITUS WITH HYPERGLYCEMIA, WITH LONG-TERM CURRENT USE OF INSULIN (H): ICD-10-CM

## 2018-06-06 DIAGNOSIS — I10 ESSENTIAL HYPERTENSION: ICD-10-CM

## 2018-06-06 DIAGNOSIS — E11.65 TYPE 2 DIABETES MELLITUS WITH HYPERGLYCEMIA, WITH LONG-TERM CURRENT USE OF INSULIN (H): ICD-10-CM

## 2018-06-06 DIAGNOSIS — F32.A DEPRESSION, UNSPECIFIED DEPRESSION TYPE: ICD-10-CM

## 2018-06-08 ENCOUNTER — RECORDS - HEALTHEAST (OUTPATIENT)
Dept: LAB | Facility: CLINIC | Age: 68
End: 2018-06-08

## 2018-06-08 LAB
ALBUMIN UR-MCNC: NEGATIVE MG/DL
APPEARANCE UR: ABNORMAL
BACTERIA #/AREA URNS HPF: ABNORMAL HPF
BILIRUB UR QL STRIP: NEGATIVE
COLOR UR AUTO: ABNORMAL
GLUCOSE UR STRIP-MCNC: NEGATIVE MG/DL
HGB UR QL STRIP: NEGATIVE
KETONES UR STRIP-MCNC: NEGATIVE MG/DL
LEUKOCYTE ESTERASE UR QL STRIP: ABNORMAL
NITRATE UR QL: NEGATIVE
PH UR STRIP: 7.5 [PH] (ref 4.5–8)
RBC #/AREA URNS AUTO: ABNORMAL HPF
SP GR UR STRIP: 1 (ref 1–1.03)
SQUAMOUS #/AREA URNS AUTO: ABNORMAL LPF
UROBILINOGEN UR STRIP-ACNC: ABNORMAL
WBC #/AREA URNS AUTO: ABNORMAL HPF
WBC CLUMPS #/AREA URNS HPF: PRESENT /[HPF]

## 2018-06-09 LAB — BACTERIA SPEC CULT: NO GROWTH

## 2018-06-14 ENCOUNTER — OFFICE VISIT (OUTPATIENT)
Dept: ENDOCRINOLOGY | Facility: CLINIC | Age: 68
End: 2018-06-14
Payer: COMMERCIAL

## 2018-06-14 DIAGNOSIS — B35.1 DERMATOPHYTOSIS OF NAIL: Primary | ICD-10-CM

## 2018-06-14 DIAGNOSIS — E11.49 TYPE II OR UNSPECIFIED TYPE DIABETES MELLITUS WITH NEUROLOGICAL MANIFESTATIONS, NOT STATED AS UNCONTROLLED(250.60) (H): ICD-10-CM

## 2018-06-14 DIAGNOSIS — E11.51 DIABETES MELLITUS WITH PERIPHERAL VASCULAR DISEASE (H): ICD-10-CM

## 2018-06-14 NOTE — LETTER
6/14/2018       RE: Cristal Preciado  [facility]  [address]  [place] [address]     Dear Colleague,    Thank you for referring your patient, Cristal Preciado, to the [facility] ENDOCRINOLOGY at [facility]. Please see a copy of my visit note below.    Past Medical History:   Diagnosis Date     AION (anterior ischaemic optic neuropathy), left eye     NAION LE     CAD (coronary artery disease) 3/2009    [facility]; Angio 2013 [facility]- normal coronary arteries     Cataract      CVA (cerebral vascular accident) (H)     admitted at [facility]     Depression     on Cymbalta     Diabetes mellitus, type 2 (H)      Diabetic nephropathy (H)      Diabetic neuropathy (H)     severe     Diabetic retinopathy (H)      GERD (gastroesophageal reflux disease)      Hyperlipidemia      Hypertension     ECHO 2013, TDS, NL EF     POAG (primary open-angle glaucoma)     adv BE     Seasonal allergies      Tubular adenoma of colon 2013    repeat colonoscopy in 2018     Patient Active Problem List   Diagnosis     Cataract     CAD (coronary artery disease)     Diabetes mellitus, type 2 (H)     Diabetic nephropathy (H)     Diabetic neuropathy (H)     Diabetic retinopathy (H)     GERD (gastroesophageal reflux disease)     Glaucoma     Hypertension     Hyperlipidemia     CVA (cerebral vascular accident) (H)     Morbid obesity (H)     Anemia     Seasonal allergies     Depression     Tubular adenoma of colon     Leg weakness     Dermatitis, seborrheic     Eczematous dermatitis     History of coronary artery disease     Venous stasis dermatitis of both lower extremities     Uncontrolled type 2 diabetes mellitus (H)     Chronic dermatitis of hands     Neoplasm of uncertain behavior of skin     Past Surgical History:   Procedure Laterality Date     COLONOSCOPY  7/15/2013    Tubular adenoma; repeat in 2018;Procedure: COMBINED COLONOSCOPY, SINGLE BIOPSY/POLYPECTOMY BY BIOPSY;;  Surgeon:  Don King MD;  Tubular adenoma     EXTRACAPSULAR CATARACT EXTRATION WITH INTRAOCULAR LENS IMPLANT  11-10-09, 2-9-10    11-10-09 Lt, 2-9-10 Rt; Left eye 8/2012     Social History     Social History     Marital status: Single     Spouse name: N/A     Number of children: N/A     Years of education: N/A     Occupational History     Not on file.     Social History Main Topics     Smoking status: Former Smoker     Quit date: 3/6/2009     Smokeless tobacco: Never Used     Alcohol use No     Drug use: No     Sexual activity: No     Other Topics Concern     Not on file     Social History Narrative    Pt has three daughters and two sons, single.  Her daughter Court, see's her often at the Nursing Home ([facility]).  Moved into Nursing Home in October 2011 after a hospitalization for ALY.  Moved from [place] in 2002 to [facility]. Has 5 grandchildren.     Family History   Problem Relation Age of Onset     Hypertension Mother      CEREBROVASCULAR DISEASE Mother      Glaucoma Father      CANCER Father      DIABETES Sister      Glaucoma Sister      Cancer - colorectal Other      CEREBROVASCULAR DISEASE Other      Skin Cancer No family hx of      Melanoma No family hx of      A1c                           7.1                                    3/16/2018  SUBJECTIVE FINDINGS: A 68-year-old female returns to clinic for diabetic foot care and toenail care.  Relates to no ulcers or sores since we have seen her last, peripheral neuropathy, left big toenail bothers her at times because she bumps it and is wearing Diabetic shoes.  Presents in her wheelchair.       OBJECTIVE FINDINGS: DP and PT are 1/4 bilaterally.  She has decreased hair growth bilaterally.  She has minimal peripheral edema bilaterally.  She relates she is wearing her compression socks.  She has incurvated, thickened, brittle nails with subungual debris, dystrophy and discoloration 1, 2 and 5 bilaterally and to a lesser degree 3 and 4 bilaterally.   There is no erythema, no drainage, no odor, no calor bilaterally.  She has decreased ankle joint dorsiflexion bilaterally, deep tendon reflexes are intact.       ASSESSMENT AND PLAN: Onychomycosis bilaterally, onychauxis bilaterally.  She is diabetic with peripheral neuropathy and vascular disease.  Diagnosis and treatment options discussed with her.  All the nails were debrided or reduced bilaterally upon consent.  Six nails were debrided.  She will return to clinic and see me in 3 months.     Again, thank you for allowing me to participate in the care of your patient.      Sincerely,    Vu Grant DPM

## 2018-06-14 NOTE — MR AVS SNAPSHOT
After Visit Summary   6/14/2018    Cristal Preciado    MRN: [id number]           Patient Information     Date Of Birth          1950        Visit Information        Provider Department      6/14/2018 1:45 PM Vu Grant DPM [facility]        Today's Diagnoses     Dermatophytosis of nail    -  1    Type II or unspecified type diabetes mellitus with neurological manifestations, not stated as uncontrolled(250.60) (H)        Diabetes mellitus with peripheral vascular disease (H)           Follow-ups after your visit        Your next 10 appointments already scheduled     Jun 26, 2018  9:30 AM CDT   (Arrive by 9:15 AM)   RETURN DIABETES with [facility] ([facility] [place])    [address]  [address] [facility] [id number] [phone]            Sep 27, 2018  2:00 PM CDT   (Arrive by 1:45 PM)   Return Visit with [facility] ([facility] [place])    [address]  [address] [facility] [id number] [phone]            Oct 31, 2018  9:45 AM CDT   RETURN GLAUCOMA with Bette Delong MD   Eye Clinic ([facility])    [address]  [facility] [id number] [phone]              Who to contact     Please call your clinic at [phone] to:    Ask questions about your health    Make or cancel appointments    Discuss your medicines    Learn about your test results    Speak to your doctor            Additional Information About Your Visit        MyChart Information     [employer] is an electronic gateway that provides easy, online access to your medical records. With [employer], you can request a clinic appointment, read your test results, renew a prescription or communicate with your care team.     To sign up for [employer] visit the website at [email or web address]   You will be asked to enter the  access code listed below, as well as some personal information. Please follow the directions to create your username and password.     Your access code is: [email or web address]  Expires: 2018  6:30 AM     Your access code will  in 90 days. If you need help or a new code, please contact your [facility] or call [phone] for assistance.        Care EveryWhere ID     This is your Care EveryWhere ID. This could be used by other organizations to access your [place] medical records  [id number]         Blood Pressure from Last 3 Encounters:   18 131/62   17 124/51   17 138/84    Weight from Last 3 Encounters:   16 106.5 kg (234 lb 12.8 oz)   03/02/15 105.7 kg (233 lb)   14 111.6 kg (246 lb)              We Performed the Following     DEBRIDEMENT OF NAILS, 6 OR MORE        Primary Care Provider Office Phone # Fax #    Meagan CORNELIUS José Miguel [phone]       [facility] AFTER HOURS [address] [place] [address]        Equal Access to Services     [facility] Center[facility]: Hadii aad ku hadasho Soomaali, waaxda luqadaha, qaybta kaalmada adeegyada, camilla chávez hayturner amezcua . So [facility] [phone].    ATENCIÓN: Si habla español, tiene a reyes disposición servicios gratuitos de asistencia lingüística. Llame al [phone].    We comply with applicable federal civil rights laws and [place] laws. We do not discriminate on the basis of race, color, national origin, age, disability, sex, sexual orientation, or gender identity.            Thank you!     Thank you for choosing [facility]  for your care. Our goal is always to provide you with excellent care. Hearing back from our patients is one way we can continue to improve our services. Please take a few minutes to complete the written survey that you may receive in the mail after your visit with us. Thank you!             Your Updated Medication List - Protect others around  you: Learn how to safely use, store and throw away your medicines at www.disposemymeds.org.          This list is accurate as of 6/14/18  2:19 PM.  Always use your most recent med list.                   Brand Name Dispense Instructions for use Diagnosis    acetaminophen 500 MG tablet    TYLENOL     Take 1-2 tablets by mouth every 6 hours as needed. Tylenol Extra Strength.        allopurinol 100 MG tablet    ZYLOPRIM    180 tablet    Take 2 tablets by mouth daily.    Hyperuricemia       AMLACTIN FOOT CREAM THERAPY EX      Externally apply 12 % topically        ASPIRIN PO      Take 81 mg by mouth daily        atorvastatin 80 MG tablet    LIPITOR    90 tablet    Take 1 tablet by mouth daily. Pt needs to have labs done prior to further refills.    Other and unspecified hyperlipidemia       CALCIUM CITRATE + D PO      600/400 mg/unit take 1 tabs daily twice daily.        carboxymethylcellulose 0.5 % Soln ophthalmic solution    REFRESH PLUS     1 drop 3 times daily as needed.        CHLORTHALIDONE PO      Take 25 mg by mouth daily        CLARITIN 10 MG tablet   Generic drug:  loratadine      Take 10 mg by mouth as needed.        clobetasol 0.05 % external solution    TEMOVATE    60 mL    Apply topically 2 times daily To itchy and scaly areas of scalp as needed.    Dermatitis, seborrheic       CYMBALTA PO      Take 60 mg by mouth daily        dorzolamide-timolol 2-0.5 % ophthalmic solution    COSOPT     Place 1 drop into both eyes 2 times daily        ferrous sulfate 325 (65 Fe) MG tablet    IRON     Take 325 mg by mouth daily (with breakfast)        folic acid 1 MG tablet    FOLVITE     Take 1 mg by mouth daily.        gabapentin 600 MG tablet    NEURONTIN    90 tablet    Take 600 mg in AM , 600 mg afternoon and 900 mg at HS.    Diabetic neuropathy (H)       ibuprofen 400 MG tablet    ADVIL/MOTRIN     Take 400 mg by mouth every 6 hours as needed.        insulin pen needle 31G X 5 MM    B-D U/F    150 each    Use 5  time(s) per day.  Please dispense as BD Pen Needle Mini U/F 31G x 5 MM    Type 2 diabetes mellitus, controlled (H)       JOBST KNEE HIGH COMPRESSION SM Misc     2 each    JOBST 20-30MMHG COMPRESSION SM MISC  To both legs during waking hours daily for leg swelling and venous stasis dermatitis. Do not sleep in stockings.    Venous stasis dermatitis of both lower extremities       * ketoconazole 2 % cream    NIZORAL    60 g    Apply topically 2 times daily To affected right ear mixed with triamcinolone ointment until return to clinic.    Dermatitis       * ketoconazole 2 % shampoo    NIZORAL    240 mL    To entire wet scalp and ears and then wash off after 5 minutes three times a week.    Dermatitis, seborrheic       * LANTUS SC      Inject 100 Units Subcutaneous 2 times daily        * insulin glargine 100 UNIT/ML injection    LANTUS VIAL    60 mL    Inject 90 units  twice daily subcutaneously    Type 2 diabetes mellitus (H)       liraglutide 18 MG/3ML Soln    VICTOZA    3 Month    Inject 1.8 mg Subcutaneous daily. Start with 0.6 mg x 5 days, then increase to 1.2 mg x 5 days, then 1.8 mg thereafter.  Do not advance to higher dose if you have nausea.    Diabetes mellitus, type 2 (H)       lisinopril 5 MG tablet    PRINIVIL/ZESTRIL    90 tablet    Take 2 tablets by mouth daily. Take one tab daily/Hold for SBP < 110    Essential hypertension, benign       LYRICA PO           metoprolol 10 mg/mL Susp    LOPRESSOR     Take 100 mg by mouth 2 times daily        mometasone 0.1 % cream    ELOCON    45 g    Apply sparingly to left medial ankle area daily.  Do not apply to face.        nitroGLYcerin 0.4 MG sublingual tablet    NITROSTAT     Place 0.4 mg under the tongue every 5 minutes as needed.        NovoLOG VIAL 100 UNITS/ML injection   Generic drug:  insulin aspart           olopatadine HCl 0.2 % Soln    PATADAY    1 Bottle    Place 1 drop into both eyes daily as needed For itchy eyes    History of itching of eye        ranitidine 150 MG tablet    ZANTAC     Take 150 mg by mouth 2 times daily        sodium chloride 0.65 % nasal spray    OCEAN     Spray 1 spray in nostril daily as needed.        triamcinolone 0.1 % ointment    KENALOG    60 g    Apply topically daily To legs under compression stockings for stasis dermatitis discoloration.    Venous stasis dermatitis of both lower extremities       XALATAN 0.005 % ophthalmic solution   Generic drug:  latanoprost      1 drop At Bedtime. Left eye        * Notice:  This list has 4 medication(s) that are the same as other medications prescribed for you. Read the directions carefully, and ask your doctor or other care provider to review them with you.

## 2018-06-14 NOTE — PROGRESS NOTES
Past Medical History:   Diagnosis Date     AION (anterior ischaemic optic neuropathy), left eye     NAION LE     CAD (coronary artery disease) 3/2009    [facility]; Angio 2013 [facility]- normal coronary arteries     Cataract      CVA (cerebral vascular accident) (H)     admitted at [facility]     on Cymbalta     Diabetes mellitus, type 2 (H)      Diabetic nephropathy (H)      Diabetic neuropathy (H)     severe     Diabetic retinopathy (H)      GERD (gastroesophageal reflux disease)      Hyperlipidemia      Hypertension     ECHO 2013, TDS, NL EF     POAG (primary open-angle glaucoma)     adv BE     Seasonal allergies      Tubular adenoma of colon 2013    repeat colonoscopy in 2018     Patient Active Problem List   Diagnosis     Cataract     CAD (coronary artery disease)     Diabetes mellitus, type 2 (H)     Diabetic nephropathy (H)     Diabetic neuropathy (H)     Diabetic retinopathy (H)     GERD (gastroesophageal reflux disease)     Glaucoma     Hypertension     Hyperlipidemia     CVA (cerebral vascular accident) (H)     Morbid obesity (H)     Anemia     Seasonal allergies     Depression     Tubular adenoma of colon     Leg weakness     Dermatitis, seborrheic     Eczematous dermatitis     History of coronary artery disease     Venous stasis dermatitis of both lower extremities     Uncontrolled type 2 diabetes mellitus (H)     Chronic dermatitis of hands     Neoplasm of uncertain behavior of skin     Past Surgical History:   Procedure Laterality Date     COLONOSCOPY  7/15/2013    Tubular adenoma; repeat in 2018;Procedure: COMBINED COLONOSCOPY, SINGLE BIOPSY/POLYPECTOMY BY BIOPSY;;  Surgeon: Don King MD;  Tubular adenoma     EXTRACAPSULAR CATARACT EXTRATION WITH INTRAOCULAR LENS IMPLANT  11-10-09, 2-9-10    11-10-09 Lt, 2-9-10 Rt; Left eye 8/2012     Social History     Social History     Marital status: Single     Spouse name: N/A     Number of children: N/A     Years of education: N/A      Occupational History     Not on file.     Social History Main Topics     Smoking status: Former Smoker     Quit date: 3/6/2009     Smokeless tobacco: Never Used     Alcohol use No     Drug use: No     Sexual activity: No     Other Topics Concern     Not on file     Social History Narrative    Pt has three daughters and two sons, single.  Her daughter Court, see's her often at the Nursing Home ([facility]).  Moved into Nursing Home in October 2011 after a hospitalization for ALY.  Moved from [place] in 2002 to [facility]. Has 5 grandchildren.     Family History   Problem Relation Age of Onset     Hypertension Mother      CEREBROVASCULAR DISEASE Mother      Glaucoma Father      CANCER Father      DIABETES Sister      Glaucoma Sister      Cancer - colorectal Other      CEREBROVASCULAR DISEASE Other      Skin Cancer No family hx of      Melanoma No family hx of      A1c                           7.1                                    3/16/2018  SUBJECTIVE FINDINGS: A 68-year-old female returns to clinic for diabetic foot care and toenail care.  Relates to no ulcers or sores since we have seen her last, peripheral neuropathy, left big toenail bothers her at times because she bumps it and is wearing Diabetic shoes.  Presents in her wheelchair.       OBJECTIVE FINDINGS: DP and PT are 1/4 bilaterally.  She has decreased hair growth bilaterally.  She has minimal peripheral edema bilaterally.  She relates she is wearing her compression socks.  She has incurvated, thickened, brittle nails with subungual debris, dystrophy and discoloration 1, 2 and 5 bilaterally and to a lesser degree 3 and 4 bilaterally.  There is no erythema, no drainage, no odor, no calor bilaterally.  She has decreased ankle joint dorsiflexion bilaterally, deep tendon reflexes are intact.       ASSESSMENT AND PLAN: Onychomycosis bilaterally, onychauxis bilaterally.  She is diabetic with peripheral neuropathy and vascular disease.  Diagnosis and  treatment options discussed with her.  All the nails were debrided or reduced bilaterally upon consent.  Six nails were debrided.  She will return to clinic and see me in 3 months.

## 2018-06-26 ENCOUNTER — TELEPHONE (OUTPATIENT)
Dept: ENDOCRINOLOGY | Facility: CLINIC | Age: 68
End: 2018-06-26

## 2018-06-26 ENCOUNTER — OFFICE VISIT (OUTPATIENT)
Dept: ENDOCRINOLOGY | Facility: CLINIC | Age: 68
End: 2018-06-26
Payer: COMMERCIAL

## 2018-06-26 VITALS
BODY MASS INDEX: 42.34 KG/M2 | SYSTOLIC BLOOD PRESSURE: 147 MMHG | HEART RATE: 73 BPM | WEIGHT: 239 LBS | DIASTOLIC BLOOD PRESSURE: 81 MMHG

## 2018-06-26 DIAGNOSIS — E11.9 WELL CONTROLLED TYPE 2 DIABETES MELLITUS (H): Primary | ICD-10-CM

## 2018-06-26 RX ORDER — TRAMADOL HYDROCHLORIDE 50 MG/1
50 TABLET ORAL PRN
Status: ON HOLD | COMMUNITY
Start: 2018-04-27 | End: 2019-08-06

## 2018-06-26 NOTE — MR AVS SNAPSHOT
After Visit Summary   6/26/2018    Cristal Preciado    MRN: [id number]           Patient Information     Date Of Birth          1950        Visit Information        Provider Department      6/26/2018 9:30 AM Renetta Washington PA-C [facility] End[facility]ology        Today's Diagnoses     Well controlled type 2 diabetes mellitus (H)    -  1       Follow-ups after your visit        Follow-up notes from your care team     Return in about 3 months (around 9/26/2018).      Your next 10 appointments already scheduled     Sep 10, 2018 12:00 PM CDT   (Arrive by 11:45 AM)   RETURN DIABETES with JODY Tapia [facility] Endocrinology ([facility])    [address] [id number] [phone]            Sep 27, 2018  2:00 PM CDT   (Arrive by 1:45 PM)   Return Visit with Vu Grant DPM   [facility] Endocrinology ([facility] [place])    [address] [id number] [phone]            Oct 10, 2018 11:00 AM CDT   (Arrive by 10:45 AM)   RETURN DIABETES with Tamela Herrera MD   [facility] Endocrinology ([facility])    [address] [id number] [phone]            Oct 31, 2018  9:45 AM CDT   RETURN GLAUCOMA with Bette Delong MD   [facility] ([facility])    [address]  [facility] [id number] [phone]              Future tests that were ordered for you today     Open Future Orders        Priority Expected Expires Ordered    Lipid Profile Routine  6/26/2019 6/26/2018            Who to contact     Please call your clinic at [phone] to:    Ask questions about your health    Make or cancel appointments    Discuss your medicines    Learn about your test results    Speak to your doctor            Additional Information About Your Visit        MyChart Information      [employer] is an electronic gateway that provides easy, online access to your medical records. With [employer], you can request a clinic appointment, read your test results, renew a prescription or communicate with your care team.     To sign up for [employer] visit the website at [email or web address]   You will be asked to enter the access code listed below, as well as some personal information. Please follow the directions to create your username and password.     Your access code is: [email or web address]  Expires: 2018  6:30 AM     Your access code will  in 90 days. If you need help or a new code, please contact your [facility] or call [phone] for assistance.        Care EveryWhere ID     This is your Care EveryWhere ID. This could be used by other organizations to access your [place] medical records  [id number]        Your Vitals Were     Pulse BMI (Body Mass Index)                73 42.34 kg/m2           Blood Pressure from Last 3 Encounters:   18 147/81   18 131/62   17 124/51    Weight from Last 3 Encounters:   18 108.4 kg (239 lb)   16 106.5 kg (234 lb 12.8 oz)   03/02/15 105.7 kg (233 lb)              We Performed the Following     Albumin Random Urine Quantitative with Creat Ratio          Today's Medication Changes          These changes are accurate as of 18 10:48 AM.  If you have any questions, ask your nurse or doctor.               These medicines have changed or have updated prescriptions.        Dose/Directions    lisinopril 5 MG tablet   Commonly known as:  PRINIVIL/ZESTRIL   This may have changed:    - how much to take  - additional instructions   Used for:  Essential hypertension, benign        Dose:  10 mg   Take 2 tablets by mouth daily. Take one tab daily/Hold for SBP < 110   Quantity:  90 tablet   Refills:  3               Information about OPIOIDS     PRESCRIPTION OPIOIDS: WHAT YOU NEED TO KNOW   We gave you an opioid  (narcotic) pain medicine. It is important to manage your pain, but opioids are not always the best choice. You should first try all the other options your care team gave you. Take this medicine for as short a time (and as few doses) as possible.     These medicines have risks:    DO NOT drive when on new or higher doses of pain medicine. These medicines can affect your alertness and reaction times, and you could be arrested for driving under the influence (DUI). If you need to use opioids long-term, talk to your care team about driving.    DO NOT operate heave machinery    DO NOT do any other dangerous activities while taking these medicines.     DO NOT drink any alcohol while taking these medicines.      If the opioid prescribed includes acetaminophen, DO NOT take with any other medicines that contain acetaminophen. Read all labels carefully. Look for the word  acetaminophen  or  Tylenol.  Ask your pharmacist if you have questions or are unsure.    You can get addicted to pain medicines, especially if you have a history of addiction (chemical, alcohol or substance dependence). Talk to your care team about ways to reduce this risk.    Store your pills in a secure place, locked if possible. We will not replace any lost or stolen medicine. If you don t finish your medicine, please throw away (dispose) as directed by your pharmacist. The [place] Pollution Control Agency has more information about safe disposal: https://www.pca.[facility].[place].us/living-green/managing-unwanted-medications.     All opioids tend to cause constipation. Drink plenty of water and eat foods that have a lot of fiber, such as fruits, vegetables, prune juice, apple juice and high-fiber cereal. Take a laxative (Miralax, milk of magnesia, Colace, Senna) if you don t move your bowels at least every other day.          Primary Care Provider Office Phone # Fax #    Meagan Valdez [phone]       [facility] AFTER HOURS [address]  [place] [address]        Equal Access to Services     [facility] Center[facility]IMBERLY : Hadii hilario ku judah Carvalho, waashleyda luqadaha, qaybta kaemily kinkarla, waxrodolfo aishwaryain hayaajade waddell sophie seven ding. So [facility] [phone].    ATENCIÓN: Si habla español, tiene a reyes disposición servicios gratuitos de asistencia lingüística. [facility] [phone].    We comply with applicable federal civil rights laws and [place] laws. We do not discriminate on the basis of race, color, national origin, age, disability, sex, sexual orientation, or gender identity.            Thank you!     Thank you for choosing [facility]  for your care. Our goal is always to provide you with excellent care. Hearing back from our patients is one way we can continue to improve our services. Please take a few minutes to complete the written survey that you may receive in the mail after your visit with us. Thank you!             Your Updated Medication List - Protect others around you: Learn how to safely use, store and throw away your medicines at www.disposemymeds.org.          This list is accurate as of 6/26/18 10:48 AM.  Always use your most recent med list.                   Brand Name Dispense Instructions for use Diagnosis    acetaminophen 500 MG tablet    TYLENOL     Take 1-2 tablets by mouth every 6 hours as needed. Tylenol Extra Strength.        allopurinol 100 MG tablet    ZYLOPRIM    180 tablet    Take 2 tablets by mouth daily.    Hyperuricemia       AMLACTIN FOOT CREAM THERAPY EX      Externally apply 12 % topically        ASPIRIN PO      Take 81 mg by mouth daily        atorvastatin 80 MG tablet    LIPITOR    90 tablet    Take 1 tablet by mouth daily. Pt needs to have labs done prior to further refills.    Other and unspecified hyperlipidemia       CALCIUM CITRATE + D PO      600/400 mg/unit take 1 tabs daily twice daily.        carboxymethylcellulose 0.5 % Soln ophthalmic solution    REFRESH PLUS     1 drop 3 times daily as  needed.        CHLORTHALIDONE PO      Take 25 mg by mouth daily        CLARITIN 10 MG tablet   Generic drug:  loratadine      Take 10 mg by mouth as needed.        clobetasol 0.05 % external solution    TEMOVATE    60 mL    Apply topically 2 times daily To itchy and scaly areas of scalp as needed.    Dermatitis, seborrheic       CYMBALTA PO      Take 60 mg by mouth daily        dorzolamide-timolol 2-0.5 % ophthalmic solution    COSOPT     Place 1 drop into both eyes 2 times daily        ferrous sulfate 325 (65 Fe) MG tablet    IRON     Take 325 mg by mouth daily (with breakfast)        folic acid 1 MG tablet    FOLVITE     Take 1 mg by mouth daily.        gabapentin 600 MG tablet    NEURONTIN    90 tablet    Take 600 mg in AM , 600 mg afternoon and 900 mg at HS.    Diabetic neuropathy (H)       ibuprofen 400 MG tablet    ADVIL/MOTRIN     Take 400 mg by mouth every 6 hours as needed.        insulin pen needle 31G X 5 MM    B-D U/F    150 each    Use 5 time(s) per day.  Please dispense as BD Pen Needle Mini U/F 31G x 5 MM    Type 2 diabetes mellitus, controlled (H)       JOBST KNEE HIGH COMPRESSION SM Misc     2 each    JOBST 20-30MMHG COMPRESSION SM MISC  To both legs during waking hours daily for leg swelling and venous stasis dermatitis. Do not sleep in stockings.    Venous stasis dermatitis of both lower extremities       * ketoconazole 2 % cream    NIZORAL    60 g    Apply topically 2 times daily To affected right ear mixed with triamcinolone ointment until return to clinic.    Dermatitis       * ketoconazole 2 % shampoo    NIZORAL    240 mL    To entire wet scalp and ears and then wash off after 5 minutes three times a week.    Dermatitis, seborrheic       * LANTUS SC      Inject 100 Units Subcutaneous 2 times daily        * insulin glargine 100 UNIT/ML injection    LANTUS VIAL    60 mL    Inject 90 units  twice daily subcutaneously    Type 2 diabetes mellitus (H)       liraglutide 18 MG/3ML Soln    VICTOZA    3  Month    Inject 1.8 mg Subcutaneous daily. Start with 0.6 mg x 5 days, then increase to 1.2 mg x 5 days, then 1.8 mg thereafter.  Do not advance to higher dose if you have nausea.    Diabetes mellitus, type 2 (H)       lisinopril 5 MG tablet    PRINIVIL/ZESTRIL    90 tablet    Take 2 tablets by mouth daily. Take one tab daily/Hold for SBP < 110    Essential hypertension, benign       LYRICA PO           metoprolol 10 mg/mL Susp    LOPRESSOR     Take 100 mg by mouth 2 times daily        mometasone 0.1 % cream    ELOCON    45 g    Apply sparingly to left medial ankle area daily.  Do not apply to face.        nitroGLYcerin 0.4 MG sublingual tablet    NITROSTAT     Place 0.4 mg under the tongue every 5 minutes as needed.        * NovoLOG VIAL 100 UNITS/ML injection   Generic drug:  insulin aspart           * insulin aspart 100 UNIT/ML injection    NovoLOG PEN     Inject 76-96 Units Subcutaneous        olopatadine HCl 0.2 % Soln    PATADAY    1 Bottle    Place 1 drop into both eyes daily as needed For itchy eyes    History of itching of eye       ranitidine 150 MG tablet    ZANTAC     Take 150 mg by mouth 2 times daily        sodium chloride 0.65 % nasal spray    OCEAN     Spray 1 spray in nostril daily as needed.        traMADol 50 MG tablet    ULTRAM          triamcinolone 0.1 % ointment    KENALOG    60 g    Apply topically daily To legs under compression stockings for stasis dermatitis discoloration.    Venous stasis dermatitis of both lower extremities       XALATAN 0.005 % ophthalmic solution   Generic drug:  latanoprost      1 drop At Bedtime. Left eye        * Notice:  This list has 6 medication(s) that are the same as other medications prescribed for you. Read the directions carefully, and ask your doctor or other care provider to review them with you.

## 2018-06-26 NOTE — TELEPHONE ENCOUNTER
[phone] [facility] or [place]   1)Renetta was going to determine if U500 pen was covered   2)Did She want a lipid profile   3)Needs dosing on U500 pen - need administration instructions calrified   4) Has lisinopril dose been change?   Sent to JAZMYN Washington

## 2018-06-26 NOTE — TELEPHONE ENCOUNTER
[facility]    Phone Message    May a detailed message be left on voicemail: yes    Reason for Call: Other: Clarification, if medication was covered & claify some orders that was written. Please call [place] back.     Action Taken: Message routed to:  [facility] ([facility]): MICHA

## 2018-06-26 NOTE — PROGRESS NOTES
HPI   Ms. Preciado returns for follow up of type 2 diabetes.  She was hospitalized in April for non-cardiac chest pain and was sent home on a lot less insulin.  Her Lantus dose was reduced from 100 units bid to 75 units bid. At that time, she had been running in the upper 200-300 range. We have talked several times and gradually increased her dose to her current dose of Lantus 100 units bid.  Blood sugars have been running mostly under 200 lately.  Her caretakers at [facility] have been testing her blood sugars 4 times a day.  She is currently taking Novolog 96 units at breakfast, 90 units with lunch and 76 with dinner plus sliding scale (NP at her nursing home raised her Novolog doses and implemented a more relaxed sliding scale of 2/50 over 200- she had previously been on 2/20 over 180 starting at 4 units).   She is also on liraglutide 1.8 mg daily and is tolerating this fine.  She is taking gabapentin for neuropathy.  She is also on Cymbalta.  Neuropathy in feet better.  She is seeing Dr. Grant for her feet.  She otherwise has been feeling well and in her usual state of health. She has no other concerns today.     ROS   GENERAL: Lost a few pounds.  No fevers, chills,  night sweats.  HEENT: no dysphagia, diplopia, neck pain or tenderness, dry/scratchy eyes, URI, cough, sinus drainage, tinnitus, sinus pressure.   CV: No CP, SOB.  No palpitations, skipped beats, LOC.  LUNGS: no cough, sputum production, wheezing   ABDOMEN: no diarrhea, constipation, abdominal pain  EXTREMITIES:  She has ongoing edema.  Hands and feet feel swollen.  Has thickened toenails, not cutting into skin.  No pain. She sees Dr. Grant.  Dry skin.  Leg ulcerations healed.  Callus on left heel.   NEUROLOGY: no changes in vision.  Continued tingling and numbness in hands and feet.   MSK: weakness in legs after stroke.  Needs help getting out of wheelchair. Does not use walker any more due to weakness.      PMH   Type 2  "diabetes  Neuropathy   Nephropathy  Stroke - uses a walker, but mainly in wheelchair.   CAD, s/p stent   HTN  Dyslipidemia  Cataracts  Glaucoma  GERD    Family Hx:   Mom- stroke  Dad- cancer  1 of 12 children  2 brothers and 2 sisters-  cancer- unsure of type  1 sister with diabetes, on insulin  5 children  Son- MS  Daughter- MS  6 grandchildren    Social Hx:   Ms. Preciado lives at [facility].  She keeps herself busy with many activities in the day, exercises (\"light and lively\"), crafts, coloring, baking, light bowling. She has many friends in their 90's there and she enjoys their company. She is seeing her family about 1-2 times a month now.     Has 5 children, all now living in [facility].  6 grandchildren.  Originally from [facility].     Current Medications  Current Outpatient Prescriptions   Medication Sig Dispense Refill     acetaminophen (TYLENOL) 500 MG tablet Take 1-2 tablets by mouth every 6 hours as needed. Tylenol Extra Strength.        ASPIRIN PO Take 81 mg by mouth daily       atorvastatin (LIPITOR) 80 MG tablet Take 1 tablet by mouth daily. Pt needs to have labs done prior to further refills. 90 tablet 0     Calcium Citrate-Vitamin D (CALCIUM CITRATE + D PO) 600/400 mg/unit take 1 tabs daily twice daily.        CHLORTHALIDONE PO Take 25 mg by mouth daily       DULoxetine HCl (CYMBALTA PO) Take 60 mg by mouth daily       ferrous sulfate (IRON) 325 (65 FE) MG tablet Take 325 mg by mouth daily (with breakfast)       folic acid (FOLVITE) 1 MG tablet Take 1 mg by mouth daily.       gabapentin (NEURONTIN) 600 MG tablet Take 600 mg in AM , 600 mg afternoon and 900 mg at HS. 90 tablet 1     Insulin Glargine (LANTUS SC) Inject 100 Units Subcutaneous 2 times daily       latanoprost (XALATAN) 0.005 % ophthalmic solution 1 drop At Bedtime. Left eye       liraglutide (VICTOZA) 18 MG/3ML SOLN Inject 1.8 mg Subcutaneous daily. Start with 0.6 mg x 5 days, then increase to 1.2 mg x 5 " days, then 1.8 mg thereafter.  Do not advance to higher dose if you have nausea. 3 Month 3     lisinopril (PRINIVIL,ZESTRIL) 5 MG tablet Take 2 tablets by mouth daily. Take one tab daily/Hold for SBP < 110 (Patient taking differently: Take 40 mg by mouth daily Take one tab daily/Hold for SBP < 110) 90 tablet 3     loratadine (CLARITIN) 10 MG tablet Take 10 mg by mouth as needed.       metoprolol (LOPRESSOR) 10 mg/mL SUSP Take 100 mg by mouth 2 times daily       nitroGLYCERIN (NITROSTAT) 0.4 MG SL tablet Place 0.4 mg under the tongue every 5 minutes as needed.       olopatadine HCl (PATADAY) 0.2 % SOLN Place 1 drop into both eyes daily as needed For itchy eyes 1 Bottle 5     Pregabalin (LYRICA PO)        ranitidine (ZANTAC) 150 MG tablet Take 150 mg by mouth 2 times daily       allopurinol (ZYLOPRIM) 100 MG tablet Take 2 tablets by mouth daily. 180 tablet 1     carboxymethylcellulose (REFRESH PLUS) 0.5 % SOLN 1 drop 3 times daily as needed.       clobetasol (TEMOVATE) 0.05 % external solution Apply topically 2 times daily To itchy and scaly areas of scalp as needed. 60 mL 3     dorzolamide-timolol (COSOPT) 2-0.5 % ophthalmic solution Place 1 drop into both eyes 2 times daily       Elastic Bandages & Supports (JOBST KNEE HIGH COMPRESSION SM) MISC JOBST 20-30MMHG COMPRESSION SM MISC   To both legs during waking hours daily for leg swelling and venous stasis dermatitis. Do not sleep in stockings. 2 each 3     ibuprofen (ADVIL,MOTRIN) 400 MG tablet Take 400 mg by mouth every 6 hours as needed.       insulin aspart (NOVOLOG PEN) 100 UNIT/ML injection Inject 76-96 Units Subcutaneous       insulin aspart (NOVOLOG VIAL) 100 UNITS/ML injection        insulin glargine (LANTUS VIAL) 100 UNIT/ML injection Inject 90 units  twice daily subcutaneously (Patient not taking: Reported on 6/26/2018) 60 mL 11     insulin pen needle (B-D U/F) 31G X 5 MM Use 5 time(s) per day.  Please dispense as BD Pen Needle Mini U/F 31G x 5  each  11     ketoconazole (NIZORAL) 2 % cream Apply topically 2 times daily To affected right ear mixed with triamcinolone ointment until return to clinic. 60 g 5     ketoconazole (NIZORAL) 2 % shampoo To entire wet scalp and ears and then wash off after 5 minutes three times a week. 240 mL 11     mometasone (ELOCON) 0.1 % cream Apply sparingly to left medial ankle area daily.  Do not apply to face. 45 g 0     Podiatric Products (AMLACTIN FOOT CREAM THERAPY EX) Externally apply 12 % topically       sodium chloride (SODIUM CHLORIDE) 0.65 % nasal spray Spray 1 spray in nostril daily as needed.       traMADol (ULTRAM) 50 MG tablet        triamcinolone (KENALOG) 0.1 % ointment Apply topically daily To legs under compression stockings for stasis dermatitis discoloration. 60 g 5       Physical Exam   /81 (BP Location: Right arm, Patient Position: Sitting, Cuff Size: Adult Large)  Pulse 73  Wt 108.4 kg (239 lb)  BMI 42.34 kg/m2  GENERAL: VSS.  Answering questions appropriately, appears stated age. Sitting in wheelchair.   LUNGS: CTA bilaterally. No wheezes, rales, ronchi  EXTREMITIES: Legs with TYREL hose. 1+ edema bilaterally.  SKIN- multiple skin tags on neck and face, neck with acanthosis nigricans    RESULTS  Lab Results   Component Value Date    A1C 7.5 (H) 10/24/2016    A1C 7.5 (H) 10/12/2015    A1C 8.1 (H) 03/06/2014    A1C 7.2 (H) 03/07/2013    A1C 7.7 (A) 02/13/2013    HEMOGLOBINA1 7.1 (A) 03/16/2018    HEMOGLOBINA1 7.3 (A) 06/05/2017    HEMOGLOBINA1 7.3 (A) 06/05/2017    HEMOGLOBINA1 7.1 (A) 02/27/2017    HEMOGLOBINA1 8.3 (A) 07/20/2016       TSH   Date Value Ref Range Status   10/24/2016 1.20 0.40 - 4.00 mU/L Final   10/12/2015 1.18 0.40 - 4.00 mU/L Final   08/21/2014 1.24 0.40 - 4.00 mU/L Final     Comment:     Effective 7/30/2014, the reference range for this assay has changed to reflect   new instrumentation/methodology.     08/05/2013 1.12 0.4 - 5.0 mU/L Final   07/15/2010 0.53 0.4 - 5.0 mU/L Final     T4  Total   Date Value Ref Range Status   10/30/2008 10.6 5.0 - 11.0 ug/dL Final     T4 Free   Date Value Ref Range Status   04/21/2006 1.04 0.70 - 1.85 ng/dL Final   09/23/2005 1.58 0.70 - 1.85 ng/dL Final       ALT   Date Value Ref Range Status   10/12/2015 39 0 - 50 U/L Final   03/06/2014 36 0 - 50 U/L Final   ]    Recent Labs   Lab Test 03/19/18  10/24/16   1301  10/12/15   1626  08/21/14   0935   05/10/12   1022   CHOL  109   --    --   110   --   144   HDL   --    --    --   43*   --   46*   LDL   --   48  51  43   < >  73   TRIG   --    --    --   122   --   123   CHOLHDLRATIO   --    --    --   2.6   --   3.1    < > = values in this interval not displayed.       Lab Results   Component Value Date     10/24/2016      Lab Results   Component Value Date    POTASSIUM 4.5 03/19/2018     Lab Results   Component Value Date    CHLORIDE 107 10/24/2016     Lab Results   Component Value Date    OLAF 9.6 10/24/2016     Lab Results   Component Value Date    CO2 26 10/24/2016     Lab Results   Component Value Date    BUN 16 10/24/2016     Lab Results   Component Value Date    CR 1.10 03/19/2018       GFR Estimate   Date Value Ref Range Status   03/19/2018 60 >50 ml/min/1.73m2 Final   10/24/2016 56 (L) >60 mL/min/1.7m2 Final     Comment:     Non  GFR Calc   10/12/2015 65 >60 mL/min/1.7m2 Final     Comment:     Non  GFR Calc     GFR Estimate If Black   Date Value Ref Range Status   03/19/2018 50 >50 ml/min/1.73m2 Final   10/24/2016 68 >60 mL/min/1.7m2 Final     Comment:      GFR Calc   10/12/2015 78 >60 mL/min/1.7m2 Final     Comment:      GFR Calc       Lab Results   Component Value Date    MICROL <5 10/12/2015     No results found for: MICROALBUMIN  No results found for: CPEPT, GADAB, ISCAB    Vitamin B12   Date Value Ref Range Status   08/05/2013 506 >210 pg/mL Final     Comment:     Interp: [id number] = Normal   ]    Most recent eye exam date: : 04/30/2018        Assessment/Plan:      1.  Type 2 diabetes with severe insulin resistance-  She currently has good glucose control with very high doses of insulin (over 450 units per day).  She is taking 8 injections per day. We discussed option of switching to more concentrated U-500 pen and agreed this will ease the burden of treatment.  Will start conservatively by reducing her total daily insulin dose by 35% and dividing TID (currently taking 462 units per day and will reduce this to 300).  Will start 120 units before breakfast, 90 units before lunch and 90 units before dinner. I anticipate we will need to increase this dose significantly over the next few weeks.  I called and discussed this with her nurse at the nursing home, [facility] ([phone]).  Will have nursing home fax blood sugars weekly and have her come back in 6 weeks to assess response to therapy, sooner with concerns or hypoglycemia.      2.  Risk factors- BP good today.  Will check for microalbuminuria.   On ACE inhibitor.  Creatinine ok.  LDL <100 on statin.  Follows with podiatry.  On gabapentin and cymbalta for neuropathy.      3.  F/U in 6 weeks with me, in 3 months with Dr. Herrera, sooner if she has any concerns.      I spent 30 minutes with this patient face to face and explained the conditions and plans (more than 50% of time was counseling/coordination of care, diabetes management, follow up plan for worsening hyper and hypoglycemia) . The patient understood and is satisfied with today's visit.     Renetta Washington PA-C, MPAS   [facility]  Department of Medicine  Division of Endocrinology and Diabetes

## 2018-06-26 NOTE — LETTER
6/26/2018       RE: Cristal Preciado  [facility]  [address]  [address]  [place] [address]     Dear Colleague,    Thank you for referring your patient, Cristal Preciado, to the [facility] ENDOCRINOLOGY at [facility] acute hospital. Please see a copy of my visit note below.    HPI   Ms. Preciado returns for follow up of type 2 diabetes.  She was hospitalized in April for non-cardiac chest pain and was sent home on a lot less insulin.  Her Lantus dose was reduced from 100 units bid to 75 units bid. At that time, she had been running in the upper 200-300 range. We have talked several times and gradually increased her dose to her current dose of Lantus 100 units bid.  Blood sugars have been running mostly under 200 lately.  Her caretakers at [facility] have been testing her blood sugars 4 times a day.  She is currently taking Novolog 96 units at breakfast, 90 units with lunch and 76 with dinner plus sliding scale (NP at her nursing home raised her Novolog doses and implemented a more relaxed sliding scale of 2/50 over 200- she had previously been on 2/20 over 180 starting at 4 units).   She is also on liraglutide 1.8 mg daily and is tolerating this fine.  She is taking gabapentin for neuropathy.  She is also on Cymbalta.  Neuropathy in feet better.  She is seeing Dr. Grant for her feet.  She otherwise has been feeling well and in her usual state of health. She has no other concerns today.     ROS   GENERAL: Lost a few pounds.  No fevers, chills,  night sweats.  HEENT: no dysphagia, diplopia, neck pain or tenderness, dry/scratchy eyes, URI, cough, sinus drainage, tinnitus, sinus pressure.   CV: No CP, SOB.  No palpitations, skipped beats, LOC.  LUNGS: no cough, sputum production, wheezing   ABDOMEN: no diarrhea, constipation, abdominal pain  EXTREMITIES:  She has ongoing edema.  Hands and feet feel swollen.  Has thickened toenails, not cutting into skin.  No pain. She sees  "Dr. Grant.  Dry skin.  Leg ulcerations healed.  Callus on left heel.   NEUROLOGY: no changes in vision.  Continued tingling and numbness in hands and feet.   MSK: weakness in legs after stroke.  Needs help getting out of wheelchair. Does not use walker any more due to weakness.      PMH   Type 2 diabetes  Neuropathy   Nephropathy  Stroke - uses a walker, but mainly in wheelchair.   CAD, s/p stent   HTN  Dyslipidemia  Cataracts  Glaucoma  GERD    Family Hx:   Mom- stroke  Dad- cancer  1 of 12 children  2 brothers and 2 sisters-  cancer- unsure of type  1 sister with diabetes, on insulin  5 children  Son- MS  Daughter- MS  6 grandchildren    Social Hx:   Ms. Preciado lives at [facility].  She keeps herself busy with many activities in the day, exercises (\"light and lively\"), crafts, coloring, baking, light bowling. She has many friends in their 90's there and she enjoys their company. She is seeing her family about 1-2 times a month now.     Has 5 children, all now living in [facility].  6 grandchildren.  Originally from [facility] [place] [facility].     Current Medications  Current Outpatient Prescriptions   Medication Sig Dispense Refill     acetaminophen (TYLENOL) 500 MG tablet Take 1-2 tablets by mouth every 6 hours as needed. Tylenol Extra Strength.        ASPIRIN PO Take 81 mg by mouth daily       atorvastatin (LIPITOR) 80 MG tablet Take 1 tablet by mouth daily. Pt needs to have labs done prior to further refills. 90 tablet 0     Calcium Citrate-Vitamin D (CALCIUM CITRATE + D PO) 600/400 mg/unit take 1 tabs daily twice daily.        CHLORTHALIDONE PO Take 25 mg by mouth daily       DULoxetine HCl (CYMBALTA PO) Take 60 mg by mouth daily       ferrous sulfate (IRON) 325 (65 FE) MG tablet Take 325 mg by mouth daily (with breakfast)       folic acid (FOLVITE) 1 MG tablet Take 1 mg by mouth daily.       gabapentin (NEURONTIN) 600 MG tablet Take 600 mg in AM , 600 mg afternoon and 900 mg at HS. 90 " tablet 1     Insulin Glargine (LANTUS SC) Inject 100 Units Subcutaneous 2 times daily       latanoprost (XALATAN) 0.005 % ophthalmic solution 1 drop At Bedtime. Left eye       liraglutide (VICTOZA) 18 MG/3ML SOLN Inject 1.8 mg Subcutaneous daily. Start with 0.6 mg x 5 days, then increase to 1.2 mg x 5 days, then 1.8 mg thereafter.  Do not advance to higher dose if you have nausea. 3 Month 3     lisinopril (PRINIVIL,ZESTRIL) 5 MG tablet Take 2 tablets by mouth daily. Take one tab daily/Hold for SBP < 110 (Patient taking differently: Take 40 mg by mouth daily Take one tab daily/Hold for SBP < 110) 90 tablet 3     loratadine (CLARITIN) 10 MG tablet Take 10 mg by mouth as needed.       metoprolol (LOPRESSOR) 10 mg/mL SUSP Take 100 mg by mouth 2 times daily       nitroGLYCERIN (NITROSTAT) 0.4 MG SL tablet Place 0.4 mg under the tongue every 5 minutes as needed.       olopatadine HCl (PATADAY) 0.2 % SOLN Place 1 drop into both eyes daily as needed For itchy eyes 1 Bottle 5     Pregabalin (LYRICA PO)        ranitidine (ZANTAC) 150 MG tablet Take 150 mg by mouth 2 times daily       allopurinol (ZYLOPRIM) 100 MG tablet Take 2 tablets by mouth daily. 180 tablet 1     carboxymethylcellulose (REFRESH PLUS) 0.5 % SOLN 1 drop 3 times daily as needed.       clobetasol (TEMOVATE) 0.05 % external solution Apply topically 2 times daily To itchy and scaly areas of scalp as needed. 60 mL 3     dorzolamide-timolol (COSOPT) 2-0.5 % ophthalmic solution Place 1 drop into both eyes 2 times daily       Elastic Bandages & Supports (JOBST KNEE HIGH COMPRESSION SM) MISC JOBST 20-30MMHG COMPRESSION SM MISC   To both legs during waking hours daily for leg swelling and venous stasis dermatitis. Do not sleep in stockings. 2 each 3     ibuprofen (ADVIL,MOTRIN) 400 MG tablet Take 400 mg by mouth every 6 hours as needed.       insulin aspart (NOVOLOG PEN) 100 UNIT/ML injection Inject 76-96 Units Subcutaneous       insulin aspart (NOVOLOG VIAL) 100  UNITS/ML injection        insulin glargine (LANTUS VIAL) 100 UNIT/ML injection Inject 90 units  twice daily subcutaneously (Patient not taking: Reported on 6/26/2018) 60 mL 11     insulin pen needle (B-D U/F) 31G X 5 MM Use 5 time(s) per day.  Please dispense as BD Pen Needle Mini U/F 31G x 5  each 11     ketoconazole (NIZORAL) 2 % cream Apply topically 2 times daily To affected right ear mixed with triamcinolone ointment until return to clinic. 60 g 5     ketoconazole (NIZORAL) 2 % shampoo To entire wet scalp and ears and then wash off after 5 minutes three times a week. 240 mL 11     mometasone (ELOCON) 0.1 % cream Apply sparingly to left medial ankle area daily.  Do not apply to face. 45 g 0     Podiatric Products (AMLACTIN FOOT CREAM THERAPY EX) Externally apply 12 % topically       sodium chloride (SODIUM CHLORIDE) 0.65 % nasal spray Spray 1 spray in nostril daily as needed.       traMADol (ULTRAM) 50 MG tablet        triamcinolone (KENALOG) 0.1 % ointment Apply topically daily To legs under compression stockings for stasis dermatitis discoloration. 60 g 5       Physical Exam   /81 (BP Location: Right arm, Patient Position: Sitting, Cuff Size: Adult Large)  Pulse 73  Wt 108.4 kg (239 lb)  BMI 42.34 kg/m2  GENERAL: VSS.  Answering questions appropriately, appears stated age. Sitting in wheelchair.   LUNGS: CTA bilaterally. No wheezes, rales, ronchi  EXTREMITIES: Legs with TYREL hose. 1+ edema bilaterally.  SKIN- multiple skin tags on neck and face, neck with acanthosis nigricans    RESULTS  Lab Results   Component Value Date    A1C 7.5 (H) 10/24/2016    A1C 7.5 (H) 10/12/2015    A1C 8.1 (H) 03/06/2014    A1C 7.2 (H) 03/07/2013    A1C 7.7 (A) 02/13/2013    HEMOGLOBINA1 7.1 (A) 03/16/2018    HEMOGLOBINA1 7.3 (A) 06/05/2017    HEMOGLOBINA1 7.3 (A) 06/05/2017    HEMOGLOBINA1 7.1 (A) 02/27/2017    HEMOGLOBINA1 8.3 (A) 07/20/2016       TSH   Date Value Ref Range Status   10/24/2016 1.20 0.40 - 4.00 mU/L  Final   10/12/2015 1.18 0.40 - 4.00 mU/L Final   08/21/2014 1.24 0.40 - 4.00 mU/L Final     Comment:     Effective 7/30/2014, the reference range for this assay has changed to reflect   new instrumentation/methodology.     08/05/2013 1.12 0.4 - 5.0 mU/L Final   07/15/2010 0.53 0.4 - 5.0 mU/L Final     T4 Total   Date Value Ref Range Status   10/30/2008 10.6 5.0 - 11.0 ug/dL Final     T4 Free   Date Value Ref Range Status   04/21/2006 1.04 0.70 - 1.85 ng/dL Final   09/23/2005 1.58 0.70 - 1.85 ng/dL Final       ALT   Date Value Ref Range Status   10/12/2015 39 0 - 50 U/L Final   03/06/2014 36 0 - 50 U/L Final   ]    Recent Labs   Lab Test 03/19/18  10/24/16   1301  10/12/15   1626  08/21/14   0935   05/10/12   1022   CHOL  109   --    --   110   --   144   HDL   --    --    --   43*   --   46*   LDL   --   48  51  43   < >  73   TRIG   --    --    --   122   --   123   CHOLHDLRATIO   --    --    --   2.6   --   3.1    < > = values in this interval not displayed.       Lab Results   Component Value Date     10/24/2016      Lab Results   Component Value Date    POTASSIUM 4.5 03/19/2018     Lab Results   Component Value Date    CHLORIDE 107 10/24/2016     Lab Results   Component Value Date    OLAF 9.6 10/24/2016     Lab Results   Component Value Date    CO2 26 10/24/2016     Lab Results   Component Value Date    BUN 16 10/24/2016     Lab Results   Component Value Date    CR 1.10 03/19/2018       GFR Estimate   Date Value Ref Range Status   03/19/2018 60 >50 ml/min/1.73m2 Final   10/24/2016 56 (L) >60 mL/min/1.7m2 Final     Comment:     Non  GFR Calc   10/12/2015 65 >60 mL/min/1.7m2 Final     Comment:     Non  GFR Calc     GFR Estimate If Black   Date Value Ref Range Status   03/19/2018 50 >50 ml/min/1.73m2 Final   10/24/2016 68 >60 mL/min/1.7m2 Final     Comment:      GFR Calc   10/12/2015 78 >60 mL/min/1.7m2 Final     Comment:      GFR Calc       Lab  Results   Component Value Date    MICROL <5 10/12/2015     No results found for: MICROALBUMIN  No results found for: CPEPT, GADAB, ISCAB    Vitamin B12   Date Value Ref Range Status   08/05/2013 506 >210 pg/mL Final     Comment:     Interp: 247-911 = Normal   ]    Most recent eye exam date: : 04/30/2018       Assessment/Plan:      1.  Type 2 diabetes with severe insulin resistance-  She currently has good glucose control with very high doses of insulin (over 450 units per day).  She is taking 8 injections per day. We discussed option of switching to more concentrated U-500 pen and agreed this will ease the burden of treatment.  Will start conservatively by reducing her total daily insulin dose by 35% and dividing TID (currently taking 462 units per day and will reduce this to 300).  Will start 120 units before breakfast, 90 units before lunch and 90 units before dinner. I anticipate we will need to increase this dose significantly over the next few weeks.  I called and discussed this with her nurse at the nursing home, [place] ([phone]).  Will have nursing home fax blood sugars weekly and have her come back in 6 weeks to assess response to therapy, sooner with concerns or hypoglycemia.      2.  Risk factors- BP good today.  Will check for microalbuminuria.   On ACE inhibitor.  Creatinine ok.  LDL <100 on statin.  Follows with podiatry.  On gabapentin and cymbalta for neuropathy.      3.  F/U in 6 weeks with me, in 3 months with Dr. Herrera, sooner if she has any concerns.      I spent 30 minutes with this patient face to face and explained the conditions and plans (more than 50% of time was counseling/coordination of care, diabetes management, follow up plan for worsening hyper and hypoglycemia) . The patient understood and is satisfied with today's visit.     Renetta Washington PA-C, MPAS   [facility]  Department of Medicine  Division of Endocrinology and Diabetes

## 2018-06-26 NOTE — NURSING NOTE
Chief Complaint   Patient presents with     RECHECK     Type II Diabetes f/u      Mana Johnson, CMA

## 2018-06-27 ENCOUNTER — TELEPHONE (OUTPATIENT)
Dept: ENDOCRINOLOGY | Facility: CLINIC | Age: 68
End: 2018-06-27

## 2018-06-27 NOTE — TELEPHONE ENCOUNTER
----- Message from Renetta Washington PA-C sent at 6/27/2018 10:08 AM CDT -----  Regarding: RE: need to clarify   Contact: [phone]  She decided she wanted to do the microalbumin when she comes back in 6 weeks.  I did not make any changes to her lisinopril yesterday and agree that it is managed by her PCP.  u500 can be dosed 30 minutes before meals.  ThankS!  Renetta    ----- Message -----     From: Chelo Marcelo RN     Sent: 6/26/2018   3:52 PM       To: Renetta Washington PA-C  Subject: need to clarify                                  You noted on her sheet that she was doing a urine random  Microalbumin here. I do not see that one was done. Do you want the nursing home  to do this along with the lipid profile?   Also your note to them had changing  Her lisinopril dose but I don't see anything aentered and they say her PCP controls that . Currently she is taking Lisinopril 40 mg am did  you want to change this or  have her PCP handle still. Pat  Called  From [facility] ... How  Much before meals should U500   be given  ? She has been getting Novolog 30 minutes before

## 2018-06-27 NOTE — TELEPHONE ENCOUNTER
[facility] Call Center    Phone Message    May a detailed message be left on voicemail: no    Reason for Call: Other: Unable to reach anyone by phone, Pt started on Humulin RU-500, is there any parameters to hold it if the blood sugar numbers are not ideal. Pt visits family and comes back at night, what are they to do regarding her medication? Can someone please f/u.      Action Taken: Other: Other: Unable to reach anyone by phone, Pt started on Humulin RU-500, is there any parameters to hold it if the blood sugar numbers are not ideal. Pt visits family and comes back at night, what are they to do regarding her medication? Can someone please f/u.

## 2018-06-27 NOTE — TELEPHONE ENCOUNTER
[phone] Paulino  Asked to be forwarded to the nurse for Cristal - phone rang for 5 minutes with no answer -

## 2018-07-02 ENCOUNTER — TELEPHONE (OUTPATIENT)
Dept: ENDOCRINOLOGY | Facility: CLINIC | Age: 68
End: 2018-07-02

## 2018-07-02 NOTE — TELEPHONE ENCOUNTER
[facility] Call Center    Phone Message    May a detailed message be left on voicemail: no    Reason for Call: Order(s): Home Care Orders: Other: Looking for clarificaitons on the orders that were submitted. Please call back, vmail is not set up     Action Taken: Other: Looking for clarificaitons on the orders that were submitted. Please call back, vmail is not set up

## 2018-07-02 NOTE — TELEPHONE ENCOUNTER
Several attempts made to call [facility] but nobody answers their phone. I faxed back clarifications of the orders faxed previously.

## 2018-07-02 NOTE — TELEPHONE ENCOUNTER
----- Message from Renetta Washington PA-C sent at 6/27/2018 10:08 AM CDT -----  Regarding: RE: need to clarify   Contact: [phone]  She decided she wanted to do the microalbumin when she comes back in 6 weeks.  I did not make any changes to her lisinopril yesterday and agree that it is managed by her PCP.  u500 can be dosed 30 minutes before meals.  ThankS!  Renetta    ----- Message -----     From: Chelo Marcelo RN     Sent: 6/26/2018   3:52 PM       To: Renetta Washington PA-C  Subject: need to clarify                                  You noted on her sheet that she was doing a urine random  Microalbumin here. I do not see that one was done. Do you want the nursing home  to do this along with the lipid profile?   Also your note to them had changing  Her lisinopril dose but I don't see anything aentered and they say her PCP controls that . Currently she is taking Lisinopril 40 mg am did  you want to change this or  have her PCP handle still. Pat  Called  From [facility] ... How  Much before meals should U500   be given  ? She has been getting Novolog 30 minutes before

## 2018-07-02 NOTE — TELEPHONE ENCOUNTER
[facility] was notified that hHer PCP handles the Lisinopril and  that  the U 500 can be given 30 minutes before a meal . They sent BS again today  And have 2 more questions.   1) Since she is on U500 and not Novolog anymore are their any parameters that are needed? EX if BS pre meal is <70 hold the U500? Anything like that ?  2) She often goes out  And does not return for their 5 PM meal and doesn't eat. When she arrives at 8 PM and eats her meal then is it ok to give the U500 at 8 PM with meal or is 8 PM too late.   I placed BS  on your desk can you  write the answers to these on the form supplied.?

## 2018-07-02 NOTE — TELEPHONE ENCOUNTER
----- Message from Renetta Washington PA-C sent at 6/27/2018 10:08 AM CDT -----  Regarding: RE: need to clarify   Contact: [phone]  She decided she wanted to do the microalbumin when she comes back in 6 weeks.  I did not make any changes to her lisinopril yesterday and agree that it is managed by her PCP.  u500 can be dosed 30 minutes before meals.  ThankS!  Renetta    ----- Message -----     From: Chelo Marcelo RN     Sent: 6/26/2018   3:52 PM       To: Renetta Washington PA-C  Subject: need to clarify                                  You noted on her sheet that she was doing a urine random  Microalbumin here. I do not see that one was done. Do you want the nursing home  to do this along with the lipid profile?   Also your note to them had changing  Her lisinopril dose but I don't see anything aentered and they say her PCP controls that . Currently she is taking Lisinopril 40 mg am did  you want to change this or  have her PCP handle still. Pat  Called  From [facility] ... How  Much before meals should U500   be given  ? She has been getting Novolog 30 minutes before

## 2018-07-09 ENCOUNTER — TELEPHONE (OUTPATIENT)
Dept: ENDOCRINOLOGY | Facility: CLINIC | Age: 68
End: 2018-07-09

## 2018-07-09 NOTE — TELEPHONE ENCOUNTER
----- Message from Renetta Washington PA-C sent at 7/9/2018  8:04 AM CDT -----  Regarding: check on glucose  Hello!  Can you please call [facility] to check on her glucose levels since changing her U-500 dose last week?  Thanks!  Renetta

## 2018-07-12 DIAGNOSIS — E11.9 WELL CONTROLLED TYPE 2 DIABETES MELLITUS (H): ICD-10-CM

## 2018-07-18 DIAGNOSIS — E11.9 WELL CONTROLLED TYPE 2 DIABETES MELLITUS (H): ICD-10-CM

## 2018-07-24 ENCOUNTER — TELEPHONE (OUTPATIENT)
Dept: ENDOCRINOLOGY | Facility: CLINIC | Age: 68
End: 2018-07-24

## 2018-07-24 NOTE — TELEPHONE ENCOUNTER
The patient had her insulin changed on July 18.  Her new program is currently    Patient currently on U500 at 130 units with breakfast, 110 units with lunch, and 120 units with dinner.    Currently, her blood sugars are 152-215 before breakfast, 187-297 with lunch, 194-202 with supper, and 233-168 prior to bedtime.    I think we can have the patient change to U500 at 130 units with breakfast, lunch, and supper.

## 2018-07-30 DIAGNOSIS — E11.9 WELL CONTROLLED TYPE 2 DIABETES MELLITUS (H): ICD-10-CM

## 2018-07-31 ENCOUNTER — OFFICE VISIT - HEALTHEAST (OUTPATIENT)
Dept: GERIATRICS | Facility: CLINIC | Age: 68
End: 2018-07-31

## 2018-07-31 ENCOUNTER — MEDICAL CORRESPONDENCE (OUTPATIENT)
Dept: HEALTH INFORMATION MANAGEMENT | Facility: CLINIC | Age: 68
End: 2018-07-31

## 2018-07-31 DIAGNOSIS — E11.9 DIABETES MELLITUS (H): ICD-10-CM

## 2018-07-31 DIAGNOSIS — I10 ESSENTIAL HYPERTENSION: ICD-10-CM

## 2018-07-31 DIAGNOSIS — H40.9 GLAUCOMA: ICD-10-CM

## 2018-07-31 DIAGNOSIS — G89.29 CHRONIC PAIN: ICD-10-CM

## 2018-07-31 DIAGNOSIS — R53.81 DEBILITY: ICD-10-CM

## 2018-08-01 ENCOUNTER — MEDICAL CORRESPONDENCE (OUTPATIENT)
Dept: HEALTH INFORMATION MANAGEMENT | Facility: CLINIC | Age: 68
End: 2018-08-01

## 2018-08-20 ENCOUNTER — TELEPHONE (OUTPATIENT)
Dept: ENDOCRINOLOGY | Facility: CLINIC | Age: 68
End: 2018-08-20

## 2018-08-20 DIAGNOSIS — E11.9 WELL CONTROLLED TYPE 2 DIABETES MELLITUS (H): ICD-10-CM

## 2018-08-20 NOTE — TELEPHONE ENCOUNTER
----- Message from Renetta Washington PA-C sent at 8/20/2018  1:44 PM CDT -----  Regarding: RE: U500 doses  Yes, thanks!  Renetta    ----- Message -----     From: Chelo Marcelo RN     Sent: 8/20/2018   1:30 PM       To: Renetta Washington PA-C  Subject: FW: U500 doses                                   She is currently on U 500 130 units  for all three meals per Dr Barnett  while you were out.  So increase to 140 units TID?   ----- Message -----     From: Renetta Washington PA-C     Sent: 8/20/2018   1:04 PM       To: Chelo Marcelo RN  Subject: U500 doses                                       Hi!  Can you please call and confirm Cristal's U500 doses?  I would like her to increase her U-500 dose by 10 units at each time point (TID) based on her glucose records sent. I believe she is on 130B/110L/120D, but want to confirm.    Thanks!  Renetta

## 2018-08-20 NOTE — TELEPHONE ENCOUNTER
U500 insulin increased to 140 units with  Breakfast, lunch and dinner . [facility]

## 2018-08-22 ENCOUNTER — AMBULATORY - HEALTHEAST (OUTPATIENT)
Dept: GERIATRICS | Facility: CLINIC | Age: 68
End: 2018-08-22

## 2018-08-22 ENCOUNTER — TELEPHONE (OUTPATIENT)
Dept: ENDOCRINOLOGY | Facility: CLINIC | Age: 68
End: 2018-08-22

## 2018-08-22 NOTE — TELEPHONE ENCOUNTER
----- Message from Renetta Washington PA-C sent at 8/22/2018  2:16 PM CDT -----  Regarding: RE: U500 doses  Yes.  Renetta Taylor    ----- Message -----     From: Chelo Marcelo RN     Sent: 8/20/2018   1:30 PM       To: Renetta Washington PA-C  Subject: FW: U500 doses                                   She is currently on U 500 130 units  for all three meals per Dr Barnett  while you were out.  So increase to 140 units TID?   ----- Message -----     From: Renetta Washington PA-C     Sent: 8/20/2018   1:04 PM       To: Chelo Mracelo RN  Subject: U500 doses                                       Hi!  Can you please call and confirm Cristal's U500 doses?  I would like her to increase her U-500 dose by 10 units at each time point (TID) based on her glucose records sent. I believe she is on 130B/110L/120D, but want to confirm.    Thanks!  Renetta

## 2018-08-27 ENCOUNTER — COMMUNICATION - HEALTHEAST (OUTPATIENT)
Dept: GERIATRICS | Facility: CLINIC | Age: 68
End: 2018-08-27

## 2018-08-27 ENCOUNTER — MEDICAL CORRESPONDENCE (OUTPATIENT)
Dept: HEALTH INFORMATION MANAGEMENT | Facility: CLINIC | Age: 68
End: 2018-08-27

## 2018-09-05 ENCOUNTER — RECORDS - HEALTHEAST (OUTPATIENT)
Dept: LAB | Facility: CLINIC | Age: 68
End: 2018-09-05

## 2018-09-05 ENCOUNTER — OFFICE VISIT - HEALTHEAST (OUTPATIENT)
Dept: GERIATRICS | Facility: CLINIC | Age: 68
End: 2018-09-05

## 2018-09-05 DIAGNOSIS — E11.65 TYPE 2 DIABETES MELLITUS WITH HYPERGLYCEMIA, WITH LONG-TERM CURRENT USE OF INSULIN (H): ICD-10-CM

## 2018-09-05 DIAGNOSIS — F32.A DEPRESSION, UNSPECIFIED DEPRESSION TYPE: ICD-10-CM

## 2018-09-05 DIAGNOSIS — Z79.4 TYPE 2 DIABETES MELLITUS WITH HYPERGLYCEMIA, WITH LONG-TERM CURRENT USE OF INSULIN (H): ICD-10-CM

## 2018-09-05 DIAGNOSIS — N18.2 STAGE 2 CHRONIC KIDNEY DISEASE: ICD-10-CM

## 2018-09-05 DIAGNOSIS — I10 ESSENTIAL HYPERTENSION: ICD-10-CM

## 2018-09-05 LAB
ALBUMIN UR-MCNC: NEGATIVE MG/DL
AMORPH CRY #/AREA URNS HPF: ABNORMAL /[HPF]
APPEARANCE UR: ABNORMAL
BACTERIA #/AREA URNS HPF: ABNORMAL HPF
BILIRUB UR QL STRIP: NEGATIVE
COLOR UR AUTO: ABNORMAL
GLUCOSE UR STRIP-MCNC: ABNORMAL MG/DL
HGB UR QL STRIP: ABNORMAL
KETONES UR STRIP-MCNC: NEGATIVE MG/DL
LEUKOCYTE ESTERASE UR QL STRIP: ABNORMAL
MUCOUS THREADS #/AREA URNS LPF: ABNORMAL LPF
NITRATE UR QL: NEGATIVE
PH UR STRIP: 6.5 [PH] (ref 4.5–8)
RBC #/AREA URNS AUTO: ABNORMAL HPF
SP GR UR STRIP: 1.01 (ref 1–1.03)
SQUAMOUS #/AREA URNS AUTO: ABNORMAL LPF
UROBILINOGEN UR STRIP-ACNC: ABNORMAL
WBC #/AREA URNS AUTO: ABNORMAL HPF

## 2018-09-06 ENCOUNTER — RECORDS - HEALTHEAST (OUTPATIENT)
Dept: LAB | Facility: CLINIC | Age: 68
End: 2018-09-06

## 2018-09-06 LAB — BACTERIA SPEC CULT: NO GROWTH

## 2018-09-07 LAB — HBA1C MFR BLD: 8.3 % (ref 4.2–6.1)

## 2018-09-10 ENCOUNTER — OFFICE VISIT (OUTPATIENT)
Dept: ENDOCRINOLOGY | Facility: CLINIC | Age: 68
End: 2018-09-10
Payer: COMMERCIAL

## 2018-09-10 VITALS — HEART RATE: 64 BPM | SYSTOLIC BLOOD PRESSURE: 150 MMHG | DIASTOLIC BLOOD PRESSURE: 90 MMHG

## 2018-09-10 DIAGNOSIS — E11.9 WELL CONTROLLED TYPE 2 DIABETES MELLITUS (H): Primary | ICD-10-CM

## 2018-09-10 ASSESSMENT — PAIN SCALES - GENERAL: PAINLEVEL: NO PAIN (0)

## 2018-09-10 NOTE — PROGRESS NOTES
HPI   Ms. Preciado returns for follow up of type 2 diabetes.  At her last visit, we decided to switch to TID U-500 from 7 injections per day, as this was quite a burden.  She is happy with the switch.  She has had no hypoglycemia, but reports that she occasionally has readings in the 90's  We have made many dose increases since the initial switch in June.  She initially started on 120/90/90 units and is now up to 140 units TID. Her caretakers at [facility] have been testing her blood sugars 4 times a day. Her blood sugars over the past week have ranged from 148-317 at breakfast, 212-340 at lunch,  at dinner and 144-280 at bedtime. She is also on liraglutide 1.8 mg daily.  She has snacks in her room- usually fig bars and Oreos.  She thinks she has lost 10# in the past few months.    She is complaining of an itchy right ear.  No noticeable drainage.  She will be seeing dermatology on Friday for removal of her skin tag.  She is taking gabapentin for neuropathy.  She is also on Cymbalta.  Neuropathy in feet better.  She is seeing Dr. Grant for her feet.  She otherwise has been feeling well and in her usual state of health. She has no other concerns today.     ROS   GENERAL: Reports weight is 235# (measured at nursing home) today.  Lost a few pounds.  No fevers, chills,  night sweats.  HEENT: no dysphagia, diplopia, neck pain or tenderness, dry/scratchy eyes, URI, cough, sinus drainage, tinnitus, sinus pressure.   CV: No CP, SOB.  No palpitations, skipped beats, LOC.  LUNGS: no cough, sputum production, wheezing   ABDOMEN: no diarrhea, constipation, abdominal pain  EXTREMITIES:  She has ongoing edema.  Hands still swollen, but having less swelling in her ankles and feet.  Has thickened toenails, not cutting into skin.  No pain. She sees Dr. Grant.  Dry skin.  Leg ulcerations healed.  Callus on left heel.    NEUROLOGY: no changes in vision.  Continued tingling and numbness in hands and feet.   MSK:  "weakness in legs after stroke.  Needs help getting out of wheelchair. Does not use walker any more due to weakness.      PMH   Type 2 diabetes  Neuropathy   Nephropathy  Stroke - uses a walker, but mainly in wheelchair.   CAD, s/p stent   HTN  Dyslipidemia  Cataracts  Glaucoma  GERD    Family Hx:   Mom- stroke  Dad- cancer  1 of 12 children  2 brothers and 2 sisters-  cancer- unsure of type  1 sister with diabetes, on insulin  5 children  Son- MS  Daughter- MS  6 grandchildren    Social Hx:   Ms. Preciado lives at [facility].  She keeps herself busy with many activities in the day, exercises (\"light and lively\"), crafts, coloring, baking, light bowling. She has many friends in their 90's there and she enjoys their company. She is seeing her family about 1-2 times a month now.     Has 5 children, all now living in [facility].  6 grandchildren.  Originally from [facility].     Current Medications  Current Outpatient Prescriptions   Medication Sig Dispense Refill     acetaminophen (TYLENOL) 500 MG tablet Take 1-2 tablets by mouth every 6 hours as needed. Tylenol Extra Strength.        allopurinol (ZYLOPRIM) 100 MG tablet Take 2 tablets by mouth daily. 180 tablet 1     ASPIRIN PO Take 81 mg by mouth daily       atorvastatin (LIPITOR) 80 MG tablet Take 1 tablet by mouth daily. Pt needs to have labs done prior to further refills. 90 tablet 0     Calcium Citrate-Vitamin D (CALCIUM CITRATE + D PO) 600/400 mg/unit take 1 tabs daily twice daily.        carboxymethylcellulose (REFRESH PLUS) 0.5 % SOLN 1 drop 3 times daily as needed.       CHLORTHALIDONE PO Take 25 mg by mouth daily       clobetasol (TEMOVATE) 0.05 % external solution Apply topically 2 times daily To itchy and scaly areas of scalp as needed. 60 mL 3     dorzolamide-timolol (COSOPT) 2-0.5 % ophthalmic solution Place 1 drop into both eyes 2 times daily       DULoxetine HCl (CYMBALTA PO) Take 60 mg by mouth daily       Elastic " Bandages & Supports (JOBST KNEE HIGH COMPRESSION SM) MISC JOBST 20-30MMHG COMPRESSION SM MISC   To both legs during waking hours daily for leg swelling and venous stasis dermatitis. Do not sleep in stockings. 2 each 3     ferrous sulfate (IRON) 325 (65 FE) MG tablet Take 325 mg by mouth daily (with breakfast)       folic acid (FOLVITE) 1 MG tablet Take 1 mg by mouth daily.       gabapentin (NEURONTIN) 600 MG tablet Take 600 mg in AM , 600 mg afternoon and 900 mg at HS. 90 tablet 1     ibuprofen (ADVIL,MOTRIN) 400 MG tablet Take 400 mg by mouth every 6 hours as needed.       insulin aspart (NOVOLOG PEN) 100 UNIT/ML injection Inject 76-96 Units Subcutaneous HOLD WHEN TAKING U500       insulin aspart (NOVOLOG VIAL) 100 UNITS/ML injection Will discontinue once starting U500       Insulin Glargine (LANTUS SC) Inject 100 Units Subcutaneous 2 times daily WILL STOP WHEN U500 IS INITIATED       insulin pen needle (B-D U/F) 31G X 5 MM Use 5 time(s) per day.  Please dispense as BD Pen Needle Mini U/F 31G x 5  each 11     insulin reg HIGH CONC (HUMULIN R U-500 KWIKPEN) 500 UNIT/ML PEN soln Inject 140 units at breakfast, 140 units at lunch and 140 units at dinner. 20 mL 11     ketoconazole (NIZORAL) 2 % cream Apply topically 2 times daily To affected right ear mixed with triamcinolone ointment until return to clinic. 60 g 5     ketoconazole (NIZORAL) 2 % shampoo To entire wet scalp and ears and then wash off after 5 minutes three times a week. 240 mL 11     latanoprost (XALATAN) 0.005 % ophthalmic solution 1 drop At Bedtime. Left eye       liraglutide (VICTOZA) 18 MG/3ML SOLN Inject 1.8 mg Subcutaneous daily. Start with 0.6 mg x 5 days, then increase to 1.2 mg x 5 days, then 1.8 mg thereafter.  Do not advance to higher dose if you have nausea. 3 Month 3     lisinopril (PRINIVIL,ZESTRIL) 5 MG tablet Take 2 tablets by mouth daily. Take one tab daily/Hold for SBP < 110 (Patient taking differently: Take 40 mg by mouth daily  Take one tab daily/Hold for SBP < 110) 90 tablet 3     loratadine (CLARITIN) 10 MG tablet Take 10 mg by mouth as needed.       metoprolol (LOPRESSOR) 10 mg/mL SUSP Take 100 mg by mouth 2 times daily       mometasone (ELOCON) 0.1 % cream Apply sparingly to left medial ankle area daily.  Do not apply to face. 45 g 0     nitroGLYCERIN (NITROSTAT) 0.4 MG SL tablet Place 0.4 mg under the tongue every 5 minutes as needed.       olopatadine HCl (PATADAY) 0.2 % SOLN Place 1 drop into both eyes daily as needed For itchy eyes 1 Bottle 5     Podiatric Products (AMLACTIN FOOT CREAM THERAPY EX) Externally apply 12 % topically       Pregabalin (LYRICA PO)        ranitidine (ZANTAC) 150 MG tablet Take 150 mg by mouth 2 times daily       sodium chloride (SODIUM CHLORIDE) 0.65 % nasal spray Spray 1 spray in nostril daily as needed.       traMADol (ULTRAM) 50 MG tablet        triamcinolone (KENALOG) 0.1 % ointment Apply topically daily To legs under compression stockings for stasis dermatitis discoloration. 60 g 5       Physical Exam   /90  Pulse 64  GENERAL: VSS.  Answering questions appropriately, appears stated age. Sitting in wheelchair.   HEENT: Evidence of excoriation on right lobe.  No drainage in EAC. Some lichenification.  Small amount of wax.  Left EAC clear.   LUNGS: CTA bilaterally. No wheezes, rales, ronchi  EXTREMITIES: Legs with TYREL hose. trace edema bilaterally.  SKIN- multiple skin tags on neck and face, neck with acanthosis nigricans    RESULTS  Lab Results   Component Value Date    A1C 7.5 (H) 10/24/2016    A1C 7.5 (H) 10/12/2015    A1C 8.1 (H) 03/06/2014    A1C 7.2 (H) 03/07/2013    A1C 7.7 (A) 02/13/2013    HEMOGLOBINA1 7.1 (A) 03/16/2018    HEMOGLOBINA1 7.3 (A) 06/05/2017    HEMOGLOBINA1 7.3 (A) 06/05/2017    HEMOGLOBINA1 7.1 (A) 02/27/2017    HEMOGLOBINA1 8.3 (A) 07/20/2016       TSH   Date Value Ref Range Status   10/24/2016 1.20 0.40 - 4.00 mU/L Final   10/12/2015 1.18 0.40 - 4.00 mU/L Final    08/21/2014 1.24 0.40 - 4.00 mU/L Final     Comment:     Effective 7/30/2014, the reference range for this assay has changed to reflect   new instrumentation/methodology.     08/05/2013 1.12 0.4 - 5.0 mU/L Final   07/15/2010 0.53 0.4 - 5.0 mU/L Final     T4 Total   Date Value Ref Range Status   10/30/2008 10.6 5.0 - 11.0 ug/dL Final     T4 Free   Date Value Ref Range Status   04/21/2006 1.04 0.70 - 1.85 ng/dL Final   09/23/2005 1.58 0.70 - 1.85 ng/dL Final       ALT   Date Value Ref Range Status   10/12/2015 39 0 - 50 U/L Final   03/06/2014 36 0 - 50 U/L Final   ]    Recent Labs   Lab Test 03/19/18  10/24/16   1301  10/12/15   1626  08/21/14   0935   05/10/12   1022   CHOL  109   --    --   110   --   144   HDL   --    --    --   43*   --   46*   LDL   --   48  51  43   < >  73   TRIG   --    --    --   122   --   123   CHOLHDLRATIO   --    --    --   2.6   --   3.1    < > = values in this interval not displayed.       Lab Results   Component Value Date     10/24/2016      Lab Results   Component Value Date    POTASSIUM 4.5 03/19/2018     Lab Results   Component Value Date    CHLORIDE 107 10/24/2016     Lab Results   Component Value Date    OLAF 9.6 10/24/2016     Lab Results   Component Value Date    CO2 26 10/24/2016     Lab Results   Component Value Date    BUN 16 10/24/2016     Lab Results   Component Value Date    CR 1.10 03/19/2018       GFR Estimate   Date Value Ref Range Status   03/19/2018 60 >50 ml/min/1.73m2 Final   10/24/2016 56 (L) >60 mL/min/1.7m2 Final     Comment:     Non  GFR Calc   10/12/2015 65 >60 mL/min/1.7m2 Final     Comment:     Non  GFR Calc     GFR Estimate If Black   Date Value Ref Range Status   03/19/2018 50 >50 ml/min/1.73m2 Final   10/24/2016 68 >60 mL/min/1.7m2 Final     Comment:      GFR Calc   10/12/2015 78 >60 mL/min/1.7m2 Final     Comment:      GFR Calc       Lab Results   Component Value Date    MICROL <5  10/12/2015     No results found for: MICROALBUMIN  No results found for: CPEPT, GADAB, ISCAB    Vitamin B12   Date Value Ref Range Status   08/05/2013 506 >210 pg/mL Final     Comment:     Interp: [id number] = Normal   ]    Most recent eye exam date: : 04/30/2018     Assessment/Plan:      1.  Type 2 diabetes with severe insulin resistance-  Doing well on U-500 transition- finally getting to the appropriate dose.   She does have some variability in her AM readings, which may be reflective of HS snacking.  Regardless, she likes the simplified regimen and fewer shots and she is not having hypoglycemia.  A1c is up a bit to 8.3%, but recent glucose readings look better.  Will increase U500 to 150B/140L/150D.  They will send glucose readings in 1 week for review and further adjustments.  Will also discuss with Dr. Herrera whether she should increase dose of Victoza or switch to Saxenda to help with weight loss. If so, might need to cut back U-500 dose.     2.  Risk factors- BP is elevated.  Will follow up with PCP at nursing home for medication adjustments, if needed.  Will check for microalbuminuria.   On ACE inhibitor.  Creatinine ok.  LDL <100 on statin.  Follows with podiatry.  On gabapentin and cymbalta for neuropathy.  Weight management- will discuss with Dr. Herrera about possibly going to higher dose of Victoza (or switch to Saxenda) to help with added weight loss and glucose control.     3. Right ear discomfort- will discuss at derm appointment later this week.  Possibly fungal.     4.  F/U in 1 month with Dr. Herrera, in 4 months with me.       I spent 30 minutes with this patient face to face and explained the conditions and plans (more than 50% of time was counseling/coordination of care, diabetes management, follow up plan for worsening hyper and hypoglycemia). The patient understood and is satisfied with today's visit.     Renetta Washington PA-C, MPAS   [facility]  Department of Medicine  Division of  Endocrinology and Diabetes

## 2018-09-10 NOTE — LETTER
9/10/2018       RE: Cristal Preciado  [facility]  [address]  [place] [address]     Dear Colleague,    Thank you for referring your patient, Cristal Preciado, to the [facility] at [facility]. Please see a copy of my visit note below.    HPI   Ms. Preciado returns for follow up of type 2 diabetes.  At her last visit, we decided to switch to TID U-500 from 7 injections per day, as this was quite a burden.  She is happy with the switch.  She has had no hypoglycemia, but reports that she occasionally has readings in the 90's  We have made many dose increases since the initial switch in June.  She initially started on 120/90/90 units and is now up to 140 units TID. Her caretakers at [facility] have been testing her blood sugars 4 times a day. Her blood sugars over the past week have ranged from 148-317 at breakfast, 212-340 at lunch,  at dinner and 144-280 at bedtime. She is also on liraglutide 1.8 mg daily.  She has snacks in her room- usually fig bars and Oreos.  She thinks she has lost 10# in the past few months.    She is complaining of an itchy right ear.  No noticeable drainage.  She will be seeing dermatology on Friday for removal of her skin tag.  She is taking gabapentin for neuropathy.  She is also on Cymbalta.  Neuropathy in feet better.  She is seeing Dr. Grant for her feet.  She otherwise has been feeling well and in her usual state of health. She has no other concerns today.     ROS   GENERAL: Reports weight is 235# (measured at nursing home) today.  Lost a few pounds.  No fevers, chills,  night sweats.  HEENT: no dysphagia, diplopia, neck pain or tenderness, dry/scratchy eyes, URI, cough, sinus drainage, tinnitus, sinus pressure.   CV: No CP, SOB.  No palpitations, skipped beats, LOC.  LUNGS: no cough, sputum production, wheezing   ABDOMEN: no diarrhea, constipation, abdominal pain  EXTREMITIES:  She has ongoing  "edema.  Hands still swollen, but having less swelling in her ankles and feet.  Has thickened toenails, not cutting into skin.  No pain. She sees Dr. Grant.  Dry skin.  Leg ulcerations healed.  Callus on left heel.    NEUROLOGY: no changes in vision.  Continued tingling and numbness in hands and feet.   MSK: weakness in legs after stroke.  Needs help getting out of wheelchair. Does not use walker any more due to weakness.      PMH   Type 2 diabetes  Neuropathy   Nephropathy  Stroke - uses a walker, but mainly in wheelchair.   CAD, s/p stent   HTN  Dyslipidemia  Cataracts  Glaucoma  GERD    Family Hx:   Mom- stroke  Dad- cancer  1 of 12 children  2 brothers and 2 sisters-  cancer- unsure of type  1 sister with diabetes, on insulin  5 children  Son- MS  Daughter- MS  6 grandchildren    Social Hx:   Ms. Preciado lives at [facility].  She keeps herself busy with many activities in the day, exercises (\"light and lively\"), crafts, coloring, baking, light bowling. She has many friends in their 90's there and she enjoys their company. She is seeing her family about 1-2 times a month now.     Has 5 children, all now living in [facility].  6 grandchildren.  Originally from [facility].     Current Medications  Current Outpatient Prescriptions   Medication Sig Dispense Refill     acetaminophen (TYLENOL) 500 MG tablet Take 1-2 tablets by mouth every 6 hours as needed. Tylenol Extra Strength.        allopurinol (ZYLOPRIM) 100 MG tablet Take 2 tablets by mouth daily. 180 tablet 1     ASPIRIN PO Take 81 mg by mouth daily       atorvastatin (LIPITOR) 80 MG tablet Take 1 tablet by mouth daily. Pt needs to have labs done prior to further refills. 90 tablet 0     Calcium Citrate-Vitamin D (CALCIUM CITRATE + D PO) 600/400 mg/unit take 1 tabs daily twice daily.        carboxymethylcellulose (REFRESH PLUS) 0.5 % SOLN 1 drop 3 times daily as needed.       CHLORTHALIDONE PO Take 25 mg by mouth daily       " clobetasol (TEMOVATE) 0.05 % external solution Apply topically 2 times daily To itchy and scaly areas of scalp as needed. 60 mL 3     dorzolamide-timolol (COSOPT) 2-0.5 % ophthalmic solution Place 1 drop into both eyes 2 times daily       DULoxetine HCl (CYMBALTA PO) Take 60 mg by mouth daily       Elastic Bandages & Supports (JOBST KNEE HIGH COMPRESSION SM) MISC JOBST 20-30MMHG COMPRESSION SM MISC   To both legs during waking hours daily for leg swelling and venous stasis dermatitis. Do not sleep in stockings. 2 each 3     ferrous sulfate (IRON) 325 (65 FE) MG tablet Take 325 mg by mouth daily (with breakfast)       folic acid (FOLVITE) 1 MG tablet Take 1 mg by mouth daily.       gabapentin (NEURONTIN) 600 MG tablet Take 600 mg in AM , 600 mg afternoon and 900 mg at HS. 90 tablet 1     ibuprofen (ADVIL,MOTRIN) 400 MG tablet Take 400 mg by mouth every 6 hours as needed.       insulin aspart (NOVOLOG PEN) 100 UNIT/ML injection Inject 76-96 Units Subcutaneous HOLD WHEN TAKING U500       insulin aspart (NOVOLOG VIAL) 100 UNITS/ML injection Will discontinue once starting U500       Insulin Glargine (LANTUS SC) Inject 100 Units Subcutaneous 2 times daily WILL STOP WHEN U500 IS INITIATED       insulin pen needle (B-D U/F) 31G X 5 MM Use 5 time(s) per day.  Please dispense as BD Pen Needle Mini U/F 31G x 5  each 11     insulin reg HIGH CONC (HUMULIN R U-500 KWIKPEN) 500 UNIT/ML PEN soln Inject 140 units at breakfast, 140 units at lunch and 140 units at dinner. 20 mL 11     ketoconazole (NIZORAL) 2 % cream Apply topically 2 times daily To affected right ear mixed with triamcinolone ointment until return to clinic. 60 g 5     ketoconazole (NIZORAL) 2 % shampoo To entire wet scalp and ears and then wash off after 5 minutes three times a week. 240 mL 11     latanoprost (XALATAN) 0.005 % ophthalmic solution 1 drop At Bedtime. Left eye       liraglutide (VICTOZA) 18 MG/3ML SOLN Inject 1.8 mg Subcutaneous daily. Start with  0.6 mg x 5 days, then increase to 1.2 mg x 5 days, then 1.8 mg thereafter.  Do not advance to higher dose if you have nausea. 3 Month 3     lisinopril (PRINIVIL,ZESTRIL) 5 MG tablet Take 2 tablets by mouth daily. Take one tab daily/Hold for SBP < 110 (Patient taking differently: Take 40 mg by mouth daily Take one tab daily/Hold for SBP < 110) 90 tablet 3     loratadine (CLARITIN) 10 MG tablet Take 10 mg by mouth as needed.       metoprolol (LOPRESSOR) 10 mg/mL SUSP Take 100 mg by mouth 2 times daily       mometasone (ELOCON) 0.1 % cream Apply sparingly to left medial ankle area daily.  Do not apply to face. 45 g 0     nitroGLYCERIN (NITROSTAT) 0.4 MG SL tablet Place 0.4 mg under the tongue every 5 minutes as needed.       olopatadine HCl (PATADAY) 0.2 % SOLN Place 1 drop into both eyes daily as needed For itchy eyes 1 Bottle 5     Podiatric Products (AMLACTIN FOOT CREAM THERAPY EX) Externally apply 12 % topically       Pregabalin (LYRICA PO)        ranitidine (ZANTAC) 150 MG tablet Take 150 mg by mouth 2 times daily       sodium chloride (SODIUM CHLORIDE) 0.65 % nasal spray Spray 1 spray in nostril daily as needed.       traMADol (ULTRAM) 50 MG tablet        triamcinolone (KENALOG) 0.1 % ointment Apply topically daily To legs under compression stockings for stasis dermatitis discoloration. 60 g 5       Physical Exam   /90  Pulse 64  GENERAL: VSS.  Answering questions appropriately, appears stated age. Sitting in wheelchair.   HEENT: Evidence of excoriation on right lobe.  No drainage in EAC. Some lichenification.  Small amount of wax.  Left EAC clear.   LUNGS: CTA bilaterally. No wheezes, rales, ronchi  EXTREMITIES: Legs with TYREL hose. trace edema bilaterally.  SKIN- multiple skin tags on neck and face, neck with acanthosis nigricans    RESULTS  Lab Results   Component Value Date    A1C 7.5 (H) 10/24/2016    A1C 7.5 (H) 10/12/2015    A1C 8.1 (H) 03/06/2014    A1C 7.2 (H) 03/07/2013    A1C 7.7 (A) 02/13/2013     HEMOGLOBINA1 7.1 (A) 03/16/2018    HEMOGLOBINA1 7.3 (A) 06/05/2017    HEMOGLOBINA1 7.3 (A) 06/05/2017    HEMOGLOBINA1 7.1 (A) 02/27/2017    HEMOGLOBINA1 8.3 (A) 07/20/2016       TSH   Date Value Ref Range Status   10/24/2016 1.20 0.40 - 4.00 mU/L Final   10/12/2015 1.18 0.40 - 4.00 mU/L Final   08/21/2014 1.24 0.40 - 4.00 mU/L Final     Comment:     Effective 7/30/2014, the reference range for this assay has changed to reflect   new instrumentation/methodology.     08/05/2013 1.12 0.4 - 5.0 mU/L Final   07/15/2010 0.53 0.4 - 5.0 mU/L Final     T4 Total   Date Value Ref Range Status   10/30/2008 10.6 5.0 - 11.0 ug/dL Final     T4 Free   Date Value Ref Range Status   04/21/2006 1.04 0.70 - 1.85 ng/dL Final   09/23/2005 1.58 0.70 - 1.85 ng/dL Final       ALT   Date Value Ref Range Status   10/12/2015 39 0 - 50 U/L Final   03/06/2014 36 0 - 50 U/L Final   ]    Recent Labs   Lab Test 03/19/18  10/24/16   1301  10/12/15   1626  08/21/14   0935   05/10/12   1022   CHOL  109   --    --   110   --   144   HDL   --    --    --   43*   --   46*   LDL   --   48  51  43   < >  73   TRIG   --    --    --   122   --   123   CHOLHDLRATIO   --    --    --   2.6   --   3.1    < > = values in this interval not displayed.       Lab Results   Component Value Date     10/24/2016      Lab Results   Component Value Date    POTASSIUM 4.5 03/19/2018     Lab Results   Component Value Date    CHLORIDE 107 10/24/2016     Lab Results   Component Value Date    OLAF 9.6 10/24/2016     Lab Results   Component Value Date    CO2 26 10/24/2016     Lab Results   Component Value Date    BUN 16 10/24/2016     Lab Results   Component Value Date    CR 1.10 03/19/2018       GFR Estimate   Date Value Ref Range Status   03/19/2018 60 >50 ml/min/1.73m2 Final   10/24/2016 56 (L) >60 mL/min/1.7m2 Final     Comment:     Non  GFR Calc   10/12/2015 65 >60 mL/min/1.7m2 Final     Comment:     Non  GFR Calc     GFR Estimate If  Black   Date Value Ref Range Status   03/19/2018 50 >50 ml/min/1.73m2 Final   10/24/2016 68 >60 mL/min/1.7m2 Final     Comment:      GFR Calc   10/12/2015 78 >60 mL/min/1.7m2 Final     Comment:      GFR Calc       Lab Results   Component Value Date    MICROL <5 10/12/2015     No results found for: MICROALBUMIN  No results found for: CPEPT, GADAB, ISCAB    Vitamin B12   Date Value Ref Range Status   08/05/2013 506 >210 pg/mL Final     Comment:     Interp: [id number] = Normal   ]    Most recent eye exam date: : 04/30/2018     Assessment/Plan:      1.  Type 2 diabetes with severe insulin resistance-  Doing well on U-500 transition- finally getting to the appropriate dose.   She does have some variability in her AM readings, which may be reflective of HS snacking.  Regardless, she likes the simplified regimen and fewer shots and she is not having hypoglycemia.  A1c is up a bit to 8.3%, but recent glucose readings look better.  Will increase U500 to 150B/140L/150D.  They will send glucose readings in 1 week for review and further adjustments.  Will also discuss with Dr. Herrera whether she should increase dose of Victoza or switch to Saxenda to help with weight loss. If so, might need to cut back U-500 dose.     2.  Risk factors- BP is elevated.  Will follow up with PCP at nursing home for medication adjustments, if needed.  Will check for microalbuminuria.   On ACE inhibitor.  Creatinine ok.  LDL <100 on statin.  Follows with podiatry.  On gabapentin and cymbalta for neuropathy.  Weight management- will discuss with Dr. Herrera about possibly going to higher dose of Victoza (or switch to Saxenda) to help with added weight loss and glucose control.     3. Right ear discomfort- will discuss at derm appointment later this week.  Possibly fungal.     4.  F/U in 1 month with Dr. Herrera, in 4 months with me.       I spent 30 minutes with this patient face to face and explained the conditions and  plans (more than 50% of time was counseling/coordination of care, diabetes management, follow up plan for worsening hyper and hypoglycemia). The patient understood and is satisfied with today's visit.       Renetta Washington PA-C, MPAS   [facility]  Department of Medicine  Division of Endocrinology and Diabetes

## 2018-09-10 NOTE — MR AVS SNAPSHOT
After Visit Summary   9/10/2018    Cristal Preciado    MRN: [id number]           Patient Information     Date Of Birth          1950        Visit Information        Provider Department      9/10/2018 12:00 PM Renetta Washington PA-C [facility]         Follow-ups after your visit        Your next 10 appointments already scheduled     Sep 14, 2018 12:45 PM CDT   (Arrive by 12:30 PM)   Return Visit with Brain Frias MD   [facility] ([facility])    [address] [place] [id number] [phone]            Sep 27, 2018  2:00 PM CDT   (Arrive by 1:45 PM)   Return Visit with Vu Grant DPM   [facility] ([facility])    [address] [place] [id number] [phone]            Oct 10, 2018 11:00 AM CDT   (Arrive by 10:45 AM)   RETURN DIABETES with Tamela Herrera MD   [facility] ([facility])    [address] [place] [id number] [phone]            Oct 31, 2018  9:45 AM CDT   RETURN GLAUCOMA with Bette Delong MD   [facility] ([facility])    [address] [id number] [phone]              Who to contact     Please call your clinic at [phone] to:    Ask questions about your health    Make or cancel appointments    Discuss your medicines    Learn about your test results    Speak to your doctor            Additional Information About Your Visit        MyChart Information     Superb is an electronic gateway that provides easy, online access to your medical records. With Superb, you can request a clinic appointment, read your test results, renew a prescription or communicate with your care team.     To sign up for Superb visit the website at [email or web address]   You will be asked to enter the access  code listed below, as well as some personal information. Please follow the directions to create your username and password.     Your access code is: [email or web address]  Expires: 2018  6:30 AM     Your access code will  in 90 days. If you need help or a new code, please contact your [facility] or call [phone] for assistance.        Care EveryWhere ID     This is your Care EveryWhere ID. This could be used by other organizations to access your [facility] medical records  [id number]        Your Vitals Were     Pulse                   64            Blood Pressure from Last 3 Encounters:   09/10/18 150/90   18 147/81   18 131/62    Weight from Last 3 Encounters:   18 108.4 kg (239 lb)   16 106.5 kg (234 lb 12.8 oz)   03/02/15 105.7 kg (233 lb)              Today, you had the following     No orders found for display         Today's Medication Changes          These changes are accurate as of 9/10/18 12:38 PM.  If you have any questions, ask your nurse or doctor.               These medicines have changed or have updated prescriptions.        Dose/Directions    lisinopril 5 MG tablet   Commonly known as:  PRINIVIL/ZESTRIL   This may have changed:    - how much to take  - additional instructions   Used for:  Essential hypertension, benign        Dose:  10 mg   Take 2 tablets by mouth daily. Take one tab daily/Hold for SBP < 110   Quantity:  90 tablet   Refills:  3                Primary Care Provider Office Phone # Fax #    Meagan ADRYAN Valdez [phone]       [facility] AFTER HOURS [address] [place] [address]        Equal Access to Services     [facility] AH: Hadii aad ku hadasho Soomaali, waaxda luqadaha, qaybta kaalmada adeegyajanet, camilla ding. So [facility] [phone].    ATENCIÓN: Si habla español, tiene a reyes disposición servicios gratuitos de asistencia lingüística. Llame al [phone].    We comply  with applicable federal civil rights laws and [place] laws. We do not discriminate on the basis of race, color, national origin, age, disability, sex, sexual orientation, or gender identity.            Thank you!     Thank you for choosing [facility]  for your care. Our goal is always to provide you with excellent care. Hearing back from our patients is one way we can continue to improve our services. Please take a few minutes to complete the written survey that you may receive in the mail after your visit with us. Thank you!             Your Updated Medication List - Protect others around you: Learn how to safely use, store and throw away your medicines at www.disposemymeds.org.          This list is accurate as of 9/10/18 12:38 PM.  Always use your most recent med list.                   Brand Name Dispense Instructions for use Diagnosis    acetaminophen 500 MG tablet    TYLENOL     Take 1-2 tablets by mouth every 6 hours as needed. Tylenol Extra Strength.        allopurinol 100 MG tablet    ZYLOPRIM    180 tablet    Take 2 tablets by mouth daily.    Hyperuricemia       AMLACTIN FOOT CREAM THERAPY EX      Externally apply 12 % topically        ASPIRIN PO      Take 81 mg by mouth daily        atorvastatin 80 MG tablet    LIPITOR    90 tablet    Take 1 tablet by mouth daily. Pt needs to have labs done prior to further refills.    Other and unspecified hyperlipidemia       CALCIUM CITRATE + D PO      600/400 mg/unit take 1 tabs daily twice daily.        carboxymethylcellulose 0.5 % Soln ophthalmic solution    REFRESH PLUS     1 drop 3 times daily as needed.        CHLORTHALIDONE PO      Take 25 mg by mouth daily        CLARITIN 10 MG tablet   Generic drug:  loratadine      Take 10 mg by mouth as needed.        clobetasol 0.05 % external solution    TEMOVATE    60 mL    Apply topically 2 times daily To itchy and scaly areas of scalp as needed.    Dermatitis, seborrheic       CYMBALTA PO      Take 60 mg  by mouth daily        dorzolamide-timolol 2-0.5 % ophthalmic solution    COSOPT     Place 1 drop into both eyes 2 times daily        ferrous sulfate 325 (65 Fe) MG tablet    IRON     Take 325 mg by mouth daily (with breakfast)        folic acid 1 MG tablet    FOLVITE     Take 1 mg by mouth daily.        gabapentin 600 MG tablet    NEURONTIN    90 tablet    Take 600 mg in AM , 600 mg afternoon and 900 mg at HS.    Diabetic neuropathy (H)       HUMULIN R U-500 KWIKPEN 500 UNIT/ML PEN soln   Generic drug:  insulin reg HIGH CONC     20 mL    Inject 140 units at breakfast, 140 units at lunch and 140 units at dinner.    Well controlled type 2 diabetes mellitus (H)       ibuprofen 400 MG tablet    ADVIL/MOTRIN     Take 400 mg by mouth every 6 hours as needed.        insulin pen needle 31G X 5 MM    B-D U/F    150 each    Use 5 time(s) per day.  Please dispense as BD Pen Needle Mini U/F 31G x 5 MM    Type 2 diabetes mellitus, controlled (H)       JOBST KNEE HIGH COMPRESSION SM Misc     2 each    JOBST 20-30MMHG COMPRESSION SM MISC  To both legs during waking hours daily for leg swelling and venous stasis dermatitis. Do not sleep in stockings.    Venous stasis dermatitis of both lower extremities       * ketoconazole 2 % cream    NIZORAL    60 g    Apply topically 2 times daily To affected right ear mixed with triamcinolone ointment until return to clinic.    Dermatitis       * ketoconazole 2 % shampoo    NIZORAL    240 mL    To entire wet scalp and ears and then wash off after 5 minutes three times a week.    Dermatitis, seborrheic       LANTUS SC      Inject 100 Units Subcutaneous 2 times daily WILL STOP WHEN U500 IS INITIATED        liraglutide 18 MG/3ML Soln    VICTOZA    3 Month    Inject 1.8 mg Subcutaneous daily. Start with 0.6 mg x 5 days, then increase to 1.2 mg x 5 days, then 1.8 mg thereafter.  Do not advance to higher dose if you have nausea.    Diabetes mellitus, type 2 (H)       lisinopril 5 MG tablet     PRINIVIL/ZESTRIL    90 tablet    Take 2 tablets by mouth daily. Take one tab daily/Hold for SBP < 110    Essential hypertension, benign       LYRICA PO           metoprolol 10 mg/mL Susp    LOPRESSOR     Take 100 mg by mouth 2 times daily        mometasone 0.1 % cream    ELOCON    45 g    Apply sparingly to left medial ankle area daily.  Do not apply to face.        nitroGLYcerin 0.4 MG sublingual tablet    NITROSTAT     Place 0.4 mg under the tongue every 5 minutes as needed.        * NovoLOG VIAL 100 UNITS/ML injection   Generic drug:  insulin aspart      Will discontinue once starting U500        * insulin aspart 100 UNIT/ML injection    NovoLOG PEN     Inject 76-96 Units Subcutaneous HOLD WHEN TAKING U500        olopatadine HCl 0.2 % Soln    PATADAY    1 Bottle    Place 1 drop into both eyes daily as needed For itchy eyes    History of itching of eye       ranitidine 150 MG tablet    ZANTAC     Take 150 mg by mouth 2 times daily        sodium chloride 0.65 % nasal spray    OCEAN     Spray 1 spray in nostril daily as needed.        traMADol 50 MG tablet    ULTRAM          triamcinolone 0.1 % ointment    KENALOG    60 g    Apply topically daily To legs under compression stockings for stasis dermatitis discoloration.    Venous stasis dermatitis of both lower extremities       XALATAN 0.005 % ophthalmic solution   Generic drug:  latanoprost      1 drop At Bedtime. Left eye        * Notice:  This list has 4 medication(s) that are the same as other medications prescribed for you. Read the directions carefully, and ask your doctor or other care provider to review them with you.

## 2018-09-12 ENCOUNTER — RECORDS - HEALTHEAST (OUTPATIENT)
Dept: LAB | Facility: CLINIC | Age: 68
End: 2018-09-12

## 2018-09-12 ENCOUNTER — TRANSFERRED RECORDS (OUTPATIENT)
Dept: HEALTH INFORMATION MANAGEMENT | Facility: CLINIC | Age: 68
End: 2018-09-12

## 2018-09-12 LAB
CREAT UR-MCNC: 13.3 MG/DL
MICROALBUMIN UR-MCNC: 9.66 MG/DL (ref 0–1.99)
MICROALBUMIN/CREAT UR: 726.3 MG/G

## 2018-09-14 ENCOUNTER — OFFICE VISIT (OUTPATIENT)
Dept: DERMATOLOGY | Facility: CLINIC | Age: 68
End: 2018-09-14
Payer: COMMERCIAL

## 2018-09-14 VITALS — SYSTOLIC BLOOD PRESSURE: 139 MMHG | DIASTOLIC BLOOD PRESSURE: 61 MMHG | HEART RATE: 75 BPM

## 2018-09-14 DIAGNOSIS — L82.1 DERMATOSIS PAPULOSA NIGRA: ICD-10-CM

## 2018-09-14 DIAGNOSIS — D48.5 NEOPLASM OF UNCERTAIN BEHAVIOR OF SKIN: Primary | ICD-10-CM

## 2018-09-14 ASSESSMENT — PAIN SCALES - GENERAL
PAINLEVEL: MILD PAIN (2)
PAINLEVEL: EXTREME PAIN (8)

## 2018-09-14 NOTE — LETTER
9/14/2018       RE: Cristal Preciado  [facility]  [address]  [place] [address]     Dear Colleague,    Thank you for referring your patient, Cristal Preciado, to the [facility] DERMATOLOGY at [facility]. Please see a copy of my visit note below.    [facility] Dermatology Note      Dermatology Problem List:  1. Verruca on the right cheek, biopsied in 2/2018  2. Skin tags on face and neck; dermatosis papulosa nigra    CC:   Chief Complaint   Patient presents with     Derm Problem     Cristal is here today for skin tag removal         Encounter Date: Sep 14, 2018    History of Present Illness:  Ms. Cristal Preciado is a 68 year old female who presents for evaluation of two facial lesions.     At long term care facility    Right cheek skin tag - removed in 2/2018 - biopsy showed wart   - itchy, grew back since biopsy and has increased in size     Under chin - skin tag present for a few months   - itchy   - requests removal        Past Medical History:   Patient Active Problem List   Diagnosis     Cataract     CAD (coronary artery disease)     Diabetes mellitus, type 2 (H)     Diabetic nephropathy (H)     Diabetic neuropathy (H)     Diabetic retinopathy (H)     GERD (gastroesophageal reflux disease)     Glaucoma     Hypertension     Hyperlipidemia     CVA (cerebral vascular accident) (H)     Morbid obesity (H)     Anemia     Seasonal allergies     Depression     Tubular adenoma of colon     Leg weakness     Dermatitis, seborrheic     Eczematous dermatitis     History of coronary artery disease     Venous stasis dermatitis of both lower extremities     Uncontrolled type 2 diabetes mellitus (H)     Chronic dermatitis of hands     Neoplasm of uncertain behavior of skin     Past Medical History:   Diagnosis Date     AION (anterior ischaemic optic neuropathy), left eye     NAION LE     CAD (coronary artery disease) 3/2009    [facility];  Angio 2013 UM- normal coronary arteries     Cataract      CVA (cerebral vascular accident) (H)     admitted at [facility]     on Cymbalta     Diabetes mellitus, type 2 (H)      Diabetic nephropathy (H)      Diabetic neuropathy (H)     severe     Diabetic retinopathy (H)      GERD (gastroesophageal reflux disease)      Hyperlipidemia      Hypertension     ECHO 2013, TDS, NL EF     POAG (primary open-angle glaucoma)     adv BE     Seasonal allergies      Tubular adenoma of colon 2013    repeat colonoscopy in 2018     Past Surgical History:   Procedure Laterality Date     COLONOSCOPY  7/15/2013    Tubular adenoma; repeat in 2018;Procedure: COMBINED COLONOSCOPY, SINGLE BIOPSY/POLYPECTOMY BY BIOPSY;;  Surgeon: Don King MD;  Tubular adenoma     EXTRACAPSULAR CATARACT EXTRATION WITH INTRAOCULAR LENS IMPLANT  11-10-09, 2-9-10    11-10-09 Lt, 2-9-10 Rt; Left eye 8/2012       Social History:   reports that she quit smoking about 9 years ago. She has never used smokeless tobacco. She reports that she does not drink alcohol or use illicit drugs.    Family History:  Family History   Problem Relation Age of Onset     Hypertension Mother      Cerebrovascular Disease Mother      Glaucoma Father      Cancer Father      Diabetes Sister      Glaucoma Sister      Cancer - colorectal Other      Cerebrovascular Disease Other      Skin Cancer No family hx of      Melanoma No family hx of        Medications:  Current Outpatient Prescriptions   Medication Sig Dispense Refill     acetaminophen (TYLENOL) 500 MG tablet Take 1-2 tablets by mouth every 6 hours as needed. Tylenol Extra Strength.        allopurinol (ZYLOPRIM) 100 MG tablet Take 2 tablets by mouth daily. 180 tablet 1     ASPIRIN PO Take 81 mg by mouth daily       atorvastatin (LIPITOR) 80 MG tablet Take 1 tablet by mouth daily. Pt needs to have labs done prior to further refills. 90 tablet 0     Calcium Citrate-Vitamin D (CALCIUM CITRATE + D PO) 600/400  mg/unit take 1 tabs daily twice daily.        carboxymethylcellulose (REFRESH PLUS) 0.5 % SOLN 1 drop 3 times daily as needed.       CHLORTHALIDONE PO Take 25 mg by mouth daily       clobetasol (TEMOVATE) 0.05 % external solution Apply topically 2 times daily To itchy and scaly areas of scalp as needed. 60 mL 3     dorzolamide-timolol (COSOPT) 2-0.5 % ophthalmic solution Place 1 drop into both eyes 2 times daily       DULoxetine HCl (CYMBALTA PO) Take 60 mg by mouth daily       Elastic Bandages & Supports (JOBST KNEE HIGH COMPRESSION SM) MISC JOBST 20-30MMHG COMPRESSION SM MISC   To both legs during waking hours daily for leg swelling and venous stasis dermatitis. Do not sleep in stockings. 2 each 3     ferrous sulfate (IRON) 325 (65 FE) MG tablet Take 325 mg by mouth daily (with breakfast)       folic acid (FOLVITE) 1 MG tablet Take 1 mg by mouth daily.       gabapentin (NEURONTIN) 600 MG tablet Take 600 mg in AM , 600 mg afternoon and 900 mg at HS. 90 tablet 1     ibuprofen (ADVIL,MOTRIN) 400 MG tablet Take 400 mg by mouth every 6 hours as needed.       insulin aspart (NOVOLOG PEN) 100 UNIT/ML injection Inject 76-96 Units Subcutaneous HOLD WHEN TAKING U500       insulin aspart (NOVOLOG VIAL) 100 UNITS/ML injection Will discontinue once starting U500       Insulin Glargine (LANTUS SC) Inject 100 Units Subcutaneous 2 times daily WILL STOP WHEN U500 IS INITIATED       insulin pen needle (B-D U/F) 31G X 5 MM Use 5 time(s) per day.  Please dispense as BD Pen Needle Mini U/F 31G x 5  each 11     insulin reg HIGH CONC (HUMULIN R U-500 KWIKPEN) 500 UNIT/ML PEN soln Inject 140 units at breakfast, 140 units at lunch and 140 units at dinner. 20 mL 11     ketoconazole (NIZORAL) 2 % cream Apply topically 2 times daily To affected right ear mixed with triamcinolone ointment until return to clinic. 60 g 5     ketoconazole (NIZORAL) 2 % shampoo To entire wet scalp and ears and then wash off after 5 minutes three times a  week. 240 mL 11     latanoprost (XALATAN) 0.005 % ophthalmic solution 1 drop At Bedtime. Left eye       liraglutide (VICTOZA) 18 MG/3ML SOLN Inject 1.8 mg Subcutaneous daily. Start with 0.6 mg x 5 days, then increase to 1.2 mg x 5 days, then 1.8 mg thereafter.  Do not advance to higher dose if you have nausea. 3 Month 3     lisinopril (PRINIVIL,ZESTRIL) 5 MG tablet Take 2 tablets by mouth daily. Take one tab daily/Hold for SBP < 110 (Patient taking differently: Take 40 mg by mouth daily Take one tab daily/Hold for SBP < 110) 90 tablet 3     loratadine (CLARITIN) 10 MG tablet Take 10 mg by mouth as needed.       metoprolol (LOPRESSOR) 10 mg/mL SUSP Take 100 mg by mouth 2 times daily       mometasone (ELOCON) 0.1 % cream Apply sparingly to left medial ankle area daily.  Do not apply to face. 45 g 0     nitroGLYCERIN (NITROSTAT) 0.4 MG SL tablet Place 0.4 mg under the tongue every 5 minutes as needed.       olopatadine HCl (PATADAY) 0.2 % SOLN Place 1 drop into both eyes daily as needed For itchy eyes 1 Bottle 5     Podiatric Products (AMLACTIN FOOT CREAM THERAPY EX) Externally apply 12 % topically       Pregabalin (LYRICA PO)        ranitidine (ZANTAC) 150 MG tablet Take 150 mg by mouth 2 times daily       sodium chloride (SODIUM CHLORIDE) 0.65 % nasal spray Spray 1 spray in nostril daily as needed.       traMADol (ULTRAM) 50 MG tablet        triamcinolone (KENALOG) 0.1 % ointment Apply topically daily To legs under compression stockings for stasis dermatitis discoloration. 60 g 5     Allergies   Allergen Reactions     Dust Mites Other (See Comments)     Sneezing runny eyes and nose.     Food Allergy Formula Hives     Mountain Dew and Walnuts     Pollen Extract Other (See Comments)     Sneezing runny eyes and nose.         Review of Systems:  -Skin Establ Pt: The patient denies any new rash, pruritus, or lesions that are symptomatic, changing or bleeding, except as per HPI.  -Constitutional: The patient denies  fatigue, fevers, chills, unintended weight loss, and night sweats.  -HEENT: Patient denies nonhealing oral sores.  -Skin: As above in HPI. No additional skin concerns.    Physical exam:  Vitals: /61 (BP Location: Right arm, Patient Position: Sitting, Cuff Size: Adult Large)  Pulse 75  GEN: This is a well developed, well-nourished female in no acute distress, in a pleasant mood. Sitting in wheelchair.  SKIN: Focused examination of the face, neck, scalp, and hands was performed.  -Verrucous exophytic papule on the right malar cheek  -Multiple pedunculated brown papules on the face and neck, including one lesion on the right medial neck  -No other lesions of concern on areas examined.     Impression/Plan:  1. Right malar cheek lesion - suspect verruca    Shave biopsy:  After discussion of benefits and risks including but not limited to bleeding/bruising, pain/swelling, infection, scar, incomplete removal, nerve damage/numbness, recurrence, and non-diagnostic biopsy, written consent, verbal consent and photographs were obtained. Time-out was performed. The area was cleaned with isopropyl alcohol.  was injected to obtain adequate anesthesia of the lesion on the right malar cheek. 0.5ml of 1% lidocaine with 1:100,000 epinephrine was injected to obtain adequate anesthesia. A  shave biopsy was performed. Hemostasis was achieved with aluminium chloride. Vaseline and a sterile dressing were applied. The patient tolerated the procedure and no complications were noted. The patient was provided with verbal and written post care instructions.      2. Right medial neck lesion - suspect skin tag versus seborrheic keratosis    Shave biopsy:  After discussion of benefits and risks including but not limited to bleeding/bruising, pain/swelling, infection, scar, incomplete removal, nerve damage/numbness, recurrence, and non-diagnostic biopsy, written consent, verbal consent and photographs were obtained. Time-out was performed.  The area was cleaned with isopropyl alcohol.  was injected to obtain adequate anesthesia of the lesion on the right medial neck. 0.5ml of 1% lidocaine with 1:100,000 epinephrine was injected to obtain adequate anesthesia. A  shave biopsy was performed. Hemostasis was achieved with aluminium chloride. Vaseline and a sterile dressing were applied. The patient tolerated the procedure and no complications were noted. The patient was provided with verbal and written post care instructions.        Follow-up prn.       Staff Involved:  Staff Only    Brain Frias MD   of Dermatology  Department of Dermatology  [facility]

## 2018-09-14 NOTE — PROGRESS NOTES
[facility] Dermatology Note      Dermatology Problem List:  1. Verruca on the right cheek, biopsied in 2/2018  2. Skin tags on face and neck; dermatosis papulosa nigra    CC:   Chief Complaint   Patient presents with     Derm Problem     Cristal is here today for skin tag removal         Encounter Date: Sep 14, 2018    History of Present Illness:  Ms. Cristal Preciado is a 68 year old female who presents for evaluation of two facial lesions.     At long term care facility    Right cheek skin tag - removed in 2/2018 - biopsy showed wart   - itchy, grew back since biopsy and has increased in size     Under chin - skin tag present for a few months   - itchy   - requests removal        Past Medical History:   Patient Active Problem List   Diagnosis     Cataract     CAD (coronary artery disease)     Diabetes mellitus, type 2 (H)     Diabetic nephropathy (H)     Diabetic neuropathy (H)     Diabetic retinopathy (H)     GERD (gastroesophageal reflux disease)     Glaucoma     Hypertension     Hyperlipidemia     CVA (cerebral vascular accident) (H)     Morbid obesity (H)     Anemia     Seasonal allergies     Depression     Tubular adenoma of colon     Leg weakness     Dermatitis, seborrheic     Eczematous dermatitis     History of coronary artery disease     Venous stasis dermatitis of both lower extremities     Uncontrolled type 2 diabetes mellitus (H)     Chronic dermatitis of hands     Neoplasm of uncertain behavior of skin     Past Medical History:   Diagnosis Date     AION (anterior ischaemic optic neuropathy), left eye     NAION LE     CAD (coronary artery disease) 3/2009    [facility]; Angio 2013 [facility]- normal coronary arteries     Cataract      CVA (cerebral vascular accident) (H)     admitted at [facility]     on Cymbalta     Diabetes mellitus, type 2 (H)      Diabetic nephropathy (H)      Diabetic neuropathy (H)     severe     Diabetic retinopathy (H)      GERD (gastroesophageal  reflux disease)      Hyperlipidemia      Hypertension     ECHO 2013, TDS, NL EF     POAG (primary open-angle glaucoma)     adv BE     Seasonal allergies      Tubular adenoma of colon 2013    repeat colonoscopy in 2018     Past Surgical History:   Procedure Laterality Date     COLONOSCOPY  7/15/2013    Tubular adenoma; repeat in 2018;Procedure: COMBINED COLONOSCOPY, SINGLE BIOPSY/POLYPECTOMY BY BIOPSY;;  Surgeon: Don King MD;  Tubular adenoma     EXTRACAPSULAR CATARACT EXTRATION WITH INTRAOCULAR LENS IMPLANT  11-10-09, 2-9-10    11-10-09 Lt, 2-9-10 Rt; Left eye 8/2012       Social History:   reports that she quit smoking about 9 years ago. She has never used smokeless tobacco. She reports that she does not drink alcohol or use illicit drugs.    Family History:  Family History   Problem Relation Age of Onset     Hypertension Mother      Cerebrovascular Disease Mother      Glaucoma Father      Cancer Father      Diabetes Sister      Glaucoma Sister      Cancer - colorectal Other      Cerebrovascular Disease Other      Skin Cancer No family hx of      Melanoma No family hx of        Medications:  Current Outpatient Prescriptions   Medication Sig Dispense Refill     acetaminophen (TYLENOL) 500 MG tablet Take 1-2 tablets by mouth every 6 hours as needed. Tylenol Extra Strength.        allopurinol (ZYLOPRIM) 100 MG tablet Take 2 tablets by mouth daily. 180 tablet 1     ASPIRIN PO Take 81 mg by mouth daily       atorvastatin (LIPITOR) 80 MG tablet Take 1 tablet by mouth daily. Pt needs to have labs done prior to further refills. 90 tablet 0     Calcium Citrate-Vitamin D (CALCIUM CITRATE + D PO) 600/400 mg/unit take 1 tabs daily twice daily.        carboxymethylcellulose (REFRESH PLUS) 0.5 % SOLN 1 drop 3 times daily as needed.       CHLORTHALIDONE PO Take 25 mg by mouth daily       clobetasol (TEMOVATE) 0.05 % external solution Apply topically 2 times daily To itchy and scaly areas of scalp as needed. 60 mL 3      dorzolamide-timolol (COSOPT) 2-0.5 % ophthalmic solution Place 1 drop into both eyes 2 times daily       DULoxetine HCl (CYMBALTA PO) Take 60 mg by mouth daily       Elastic Bandages & Supports (JOBST KNEE HIGH COMPRESSION SM) MISC JOBST 20-30MMHG COMPRESSION SM MISC   To both legs during waking hours daily for leg swelling and venous stasis dermatitis. Do not sleep in stockings. 2 each 3     ferrous sulfate (IRON) 325 (65 FE) MG tablet Take 325 mg by mouth daily (with breakfast)       folic acid (FOLVITE) 1 MG tablet Take 1 mg by mouth daily.       gabapentin (NEURONTIN) 600 MG tablet Take 600 mg in AM , 600 mg afternoon and 900 mg at HS. 90 tablet 1     ibuprofen (ADVIL,MOTRIN) 400 MG tablet Take 400 mg by mouth every 6 hours as needed.       insulin aspart (NOVOLOG PEN) 100 UNIT/ML injection Inject 76-96 Units Subcutaneous HOLD WHEN TAKING U500       insulin aspart (NOVOLOG VIAL) 100 UNITS/ML injection Will discontinue once starting U500       Insulin Glargine (LANTUS SC) Inject 100 Units Subcutaneous 2 times daily WILL STOP WHEN U500 IS INITIATED       insulin pen needle (B-D U/F) 31G X 5 MM Use 5 time(s) per day.  Please dispense as BD Pen Needle Mini U/F 31G x 5  each 11     insulin reg HIGH CONC (HUMULIN R U-500 KWIKPEN) 500 UNIT/ML PEN soln Inject 140 units at breakfast, 140 units at lunch and 140 units at dinner. 20 mL 11     ketoconazole (NIZORAL) 2 % cream Apply topically 2 times daily To affected right ear mixed with triamcinolone ointment until return to clinic. 60 g 5     ketoconazole (NIZORAL) 2 % shampoo To entire wet scalp and ears and then wash off after 5 minutes three times a week. 240 mL 11     latanoprost (XALATAN) 0.005 % ophthalmic solution 1 drop At Bedtime. Left eye       liraglutide (VICTOZA) 18 MG/3ML SOLN Inject 1.8 mg Subcutaneous daily. Start with 0.6 mg x 5 days, then increase to 1.2 mg x 5 days, then 1.8 mg thereafter.  Do not advance to higher dose if you have nausea. 3  Month 3     lisinopril (PRINIVIL,ZESTRIL) 5 MG tablet Take 2 tablets by mouth daily. Take one tab daily/Hold for SBP < 110 (Patient taking differently: Take 40 mg by mouth daily Take one tab daily/Hold for SBP < 110) 90 tablet 3     loratadine (CLARITIN) 10 MG tablet Take 10 mg by mouth as needed.       metoprolol (LOPRESSOR) 10 mg/mL SUSP Take 100 mg by mouth 2 times daily       mometasone (ELOCON) 0.1 % cream Apply sparingly to left medial ankle area daily.  Do not apply to face. 45 g 0     nitroGLYCERIN (NITROSTAT) 0.4 MG SL tablet Place 0.4 mg under the tongue every 5 minutes as needed.       olopatadine HCl (PATADAY) 0.2 % SOLN Place 1 drop into both eyes daily as needed For itchy eyes 1 Bottle 5     Podiatric Products (AMLACTIN FOOT CREAM THERAPY EX) Externally apply 12 % topically       Pregabalin (LYRICA PO)        ranitidine (ZANTAC) 150 MG tablet Take 150 mg by mouth 2 times daily       sodium chloride (SODIUM CHLORIDE) 0.65 % nasal spray Spray 1 spray in nostril daily as needed.       traMADol (ULTRAM) 50 MG tablet        triamcinolone (KENALOG) 0.1 % ointment Apply topically daily To legs under compression stockings for stasis dermatitis discoloration. 60 g 5     Allergies   Allergen Reactions     Dust Mites Other (See Comments)     Sneezing runny eyes and nose.     Food Allergy Formula Hives     Mountain Dew and Walnuts     Pollen Extract Other (See Comments)     Sneezing runny eyes and nose.         Review of Systems:  -Skin Establ Pt: The patient denies any new rash, pruritus, or lesions that are symptomatic, changing or bleeding, except as per HPI.  -Constitutional: The patient denies fatigue, fevers, chills, unintended weight loss, and night sweats.  -HEENT: Patient denies nonhealing oral sores.  -Skin: As above in HPI. No additional skin concerns.    Physical exam:  Vitals: /61 (BP Location: Right arm, Patient Position: Sitting, Cuff Size: Adult Large)  Pulse 75  GEN: This is a well  developed, well-nourished female in no acute distress, in a pleasant mood. Sitting in wheelchair.  SKIN: Focused examination of the face, neck, scalp, and hands was performed.  -Verrucous exophytic papule on the right malar cheek  -Multiple pedunculated brown papules on the face and neck, including one lesion on the right medial neck  -No other lesions of concern on areas examined.     Impression/Plan:  1. Right malar cheek lesion - suspect verruca    Shave biopsy:  After discussion of benefits and risks including but not limited to bleeding/bruising, pain/swelling, infection, scar, incomplete removal, nerve damage/numbness, recurrence, and non-diagnostic biopsy, written consent, verbal consent and photographs were obtained. Time-out was performed. The area was cleaned with isopropyl alcohol.  was injected to obtain adequate anesthesia of the lesion on the right malar cheek. 0.5ml of 1% lidocaine with 1:100,000 epinephrine was injected to obtain adequate anesthesia. A  shave biopsy was performed. Hemostasis was achieved with aluminium chloride. Vaseline and a sterile dressing were applied. The patient tolerated the procedure and no complications were noted. The patient was provided with verbal and written post care instructions.      2. Right medial neck lesion - suspect skin tag versus seborrheic keratosis    Shave biopsy:  After discussion of benefits and risks including but not limited to bleeding/bruising, pain/swelling, infection, scar, incomplete removal, nerve damage/numbness, recurrence, and non-diagnostic biopsy, written consent, verbal consent and photographs were obtained. Time-out was performed. The area was cleaned with isopropyl alcohol.  was injected to obtain adequate anesthesia of the lesion on the right medial neck. 0.5ml of 1% lidocaine with 1:100,000 epinephrine was injected to obtain adequate anesthesia. A  shave biopsy was performed. Hemostasis was achieved with aluminium chloride. Vaseline and  a sterile dressing were applied. The patient tolerated the procedure and no complications were noted. The patient was provided with verbal and written post care instructions.        Follow-up prn.       Staff Involved:  Staff Only    Brain Frias MD   of Dermatology  Department of Dermatology  [facility]

## 2018-09-14 NOTE — NURSING NOTE
Biopsies performed on the right medial cheek and right medial neck. Injected 1.5ml of Xylocaine  (Lidocaine 1%) in both sites.      Present for procedure:  Dr. Altagracia Guaman LPN    Both sites were dressed with vaseline and was then covered with a bandage. Wound care instructions reviewed, patient denies pain. No other questions or concerns.  Chloé Guaman LPN

## 2018-09-14 NOTE — NURSING NOTE
Chief Complaint   Patient presents with     Derm Problem     Cristal is here today for skin tag removal     Chloé Guaman LPN

## 2018-09-14 NOTE — MR AVS SNAPSHOT
After Visit Summary   9/14/2018    Cristal Preciado    MRN: [id number]           Patient Information     Date Of Birth          1950        Visit Information        Provider Department      9/14/2018 12:45 PM Brain Frias MD [facility]        Today's Diagnoses     Neoplasm of uncertain behavior of skin    -  1    Dermatosis papulosa nigra           Follow-ups after your visit        Your next 10 appointments already scheduled     Sep 27, 2018  2:00 PM CDT   (Arrive by 1:45 PM)   Return Visit with Vu Grant DPM   [facility] ([facility])    [address] [place] [id number] [phone]            Oct 10, 2018 11:00 AM CDT   (Arrive by 10:45 AM)   RETURN DIABETES with Tamela Herrera MD   [facility] ([facility])    [address] [place] [id number] [phone]            Oct 31, 2018  9:45 AM CDT   RETURN GLAUCOMA with Bette Delong MD   [facility] ([facility])    [address]  [facility] [id number] [phone]              Who to contact     Please call your clinic at [phone] to:    Ask questions about your health    Make or cancel appointments    Discuss your medicines    Learn about your test results    Speak to your doctor            Additional Information About Your Visit        MyChart Information     FusionOnet is an electronic gateway that provides easy, online access to your medical records. With Pryv, you can request a clinic appointment, read your test results, renew a prescription or communicate with your care team.     To sign up for FusionOnet visit the website at [email or web address]   You will be asked to enter the access code listed below, as well as some personal information. Please follow the directions to create your username and password.     Your  access code is: [email or web address]  Expires: 2018  6:30 AM     Your access code will  in 90 days. If you need help or a new code, please contact your [facility] or call [phone] for assistance.        Care EveryWhere ID     This is your Care EveryWhere ID. This could be used by other organizations to access your [facility] medical records  [id number]        Your Vitals Were     Pulse                   75            Blood Pressure from Last 3 Encounters:   18 139/61   09/10/18 150/90   18 147/81    Weight from Last 3 Encounters:   18 108.4 kg (239 lb)   16 106.5 kg (234 lb 12.8 oz)   03/02/15 105.7 kg (233 lb)              We Performed the Following     Dermatological path order and indications          Today's Medication Changes          These changes are accurate as of 18  5:09 PM.  If you have any questions, ask your nurse or doctor.               These medicines have changed or have updated prescriptions.        Dose/Directions    lisinopril 5 MG tablet   Commonly known as:  PRINIVIL/ZESTRIL   This may have changed:    - how much to take  - additional instructions   Used for:  Essential hypertension, benign        Dose:  10 mg   Take 2 tablets by mouth daily. Take one tab daily/Hold for SBP < 110   Quantity:  90 tablet   Refills:  3                Primary Care Provider Office Phone # Fax #    Meagan Valdez [phone]       [facility] AFTER HOURS [address] [place] [address]        Equal Access to Services     PASCALE JANSEN AH: Hadii aad ku hadasho Soomaali, waaxda luqadaha, qaybta kaalmada adeegyada, waxrodolfo kyler hayturner amezcua . So [facility] [phone].    ATENCIÓN: Si habla español, tiene a reyes disposición servicios gratuitos de asistencia lingüística. Llame al [phone].    We comply with applicable federal civil rights laws and [place] laws. We do not discriminate on the basis of race, color,  national origin, age, disability, sex, sexual orientation, or gender identity.            Thank you!     Thank you for choosing [facility]  for your care. Our goal is always to provide you with excellent care. Hearing back from our patients is one way we can continue to improve our services. Please take a few minutes to complete the written survey that you may receive in the mail after your visit with us. Thank you!             Your Updated Medication List - Protect others around you: Learn how to safely use, store and throw away your medicines at www.disposemymeds.org.          This list is accurate as of 9/14/18  5:09 PM.  Always use your most recent med list.                   Brand Name Dispense Instructions for use Diagnosis    acetaminophen 500 MG tablet    TYLENOL     Take 1-2 tablets by mouth every 6 hours as needed. Tylenol Extra Strength.        allopurinol 100 MG tablet    ZYLOPRIM    180 tablet    Take 2 tablets by mouth daily.    Hyperuricemia       AMLACTIN FOOT CREAM THERAPY EX      Externally apply 12 % topically        ASPIRIN PO      Take 81 mg by mouth daily        atorvastatin 80 MG tablet    LIPITOR    90 tablet    Take 1 tablet by mouth daily. Pt needs to have labs done prior to further refills.    Other and unspecified hyperlipidemia       CALCIUM CITRATE + D PO      600/400 mg/unit take 1 tabs daily twice daily.        carboxymethylcellulose 0.5 % Soln ophthalmic solution    REFRESH PLUS     1 drop 3 times daily as needed.        CHLORTHALIDONE PO      Take 25 mg by mouth daily        CLARITIN 10 MG tablet   Generic drug:  loratadine      Take 10 mg by mouth as needed.        clobetasol 0.05 % external solution    TEMOVATE    60 mL    Apply topically 2 times daily To itchy and scaly areas of scalp as needed.    Dermatitis, seborrheic       CYMBALTA PO      Take 60 mg by mouth daily        dorzolamide-timolol 2-0.5 % ophthalmic solution    COSOPT     Place 1 drop into both eyes 2  times daily        ferrous sulfate 325 (65 Fe) MG tablet    IRON     Take 325 mg by mouth daily (with breakfast)        folic acid 1 MG tablet    FOLVITE     Take 1 mg by mouth daily.        gabapentin 600 MG tablet    NEURONTIN    90 tablet    Take 600 mg in AM , 600 mg afternoon and 900 mg at HS.    Diabetic neuropathy (H)       HUMULIN R U-500 KWIKPEN 500 UNIT/ML PEN soln   Generic drug:  insulin reg HIGH CONC     20 mL    Inject 140 units at breakfast, 140 units at lunch and 140 units at dinner.    Well controlled type 2 diabetes mellitus (H)       ibuprofen 400 MG tablet    ADVIL/MOTRIN     Take 400 mg by mouth every 6 hours as needed.        insulin pen needle 31G X 5 MM    B-D U/F    150 each    Use 5 time(s) per day.  Please dispense as BD Pen Needle Mini U/F 31G x 5 MM    Type 2 diabetes mellitus, controlled (H)       JOBST KNEE HIGH COMPRESSION SM Misc     2 each    JOBST 20-30MMHG COMPRESSION SM MISC  To both legs during waking hours daily for leg swelling and venous stasis dermatitis. Do not sleep in stockings.    Venous stasis dermatitis of both lower extremities       * ketoconazole 2 % cream    NIZORAL    60 g    Apply topically 2 times daily To affected right ear mixed with triamcinolone ointment until return to clinic.    Dermatitis       * ketoconazole 2 % shampoo    NIZORAL    240 mL    To entire wet scalp and ears and then wash off after 5 minutes three times a week.    Dermatitis, seborrheic       LANTUS SC      Inject 100 Units Subcutaneous 2 times daily WILL STOP WHEN U500 IS INITIATED        liraglutide 18 MG/3ML Soln    VICTOZA    3 Month    Inject 1.8 mg Subcutaneous daily. Start with 0.6 mg x 5 days, then increase to 1.2 mg x 5 days, then 1.8 mg thereafter.  Do not advance to higher dose if you have nausea.    Diabetes mellitus, type 2 (H)       lisinopril 5 MG tablet    PRINIVIL/ZESTRIL    90 tablet    Take 2 tablets by mouth daily. Take one tab daily/Hold for SBP < 110    Essential  hypertension, benign       LYRICA PO           metoprolol 10 mg/mL Susp    LOPRESSOR     Take 100 mg by mouth 2 times daily        mometasone 0.1 % cream    ELOCON    45 g    Apply sparingly to left medial ankle area daily.  Do not apply to face.        nitroGLYcerin 0.4 MG sublingual tablet    NITROSTAT     Place 0.4 mg under the tongue every 5 minutes as needed.        * NovoLOG VIAL 100 UNITS/ML injection   Generic drug:  insulin aspart      Will discontinue once starting U500        * insulin aspart 100 UNIT/ML injection    NovoLOG PEN     Inject 76-96 Units Subcutaneous HOLD WHEN TAKING U500        olopatadine HCl 0.2 % Soln    PATADAY    1 Bottle    Place 1 drop into both eyes daily as needed For itchy eyes    History of itching of eye       ranitidine 150 MG tablet    ZANTAC     Take 150 mg by mouth 2 times daily        sodium chloride 0.65 % nasal spray    OCEAN     Spray 1 spray in nostril daily as needed.        traMADol 50 MG tablet    ULTRAM          triamcinolone 0.1 % ointment    KENALOG    60 g    Apply topically daily To legs under compression stockings for stasis dermatitis discoloration.    Venous stasis dermatitis of both lower extremities       XALATAN 0.005 % ophthalmic solution   Generic drug:  latanoprost      1 drop At Bedtime. Left eye        * Notice:  This list has 4 medication(s) that are the same as other medications prescribed for you. Read the directions carefully, and ask your doctor or other care provider to review them with you.

## 2018-09-19 ENCOUNTER — MEDICAL CORRESPONDENCE (OUTPATIENT)
Dept: HEALTH INFORMATION MANAGEMENT | Facility: CLINIC | Age: 68
End: 2018-09-19

## 2018-09-19 LAB — COPATH REPORT: NORMAL

## 2018-09-27 ENCOUNTER — MEDICAL CORRESPONDENCE (OUTPATIENT)
Dept: HEALTH INFORMATION MANAGEMENT | Facility: CLINIC | Age: 68
End: 2018-09-27

## 2018-09-27 ENCOUNTER — OFFICE VISIT (OUTPATIENT)
Dept: ENDOCRINOLOGY | Facility: CLINIC | Age: 68
End: 2018-09-27
Payer: COMMERCIAL

## 2018-09-27 DIAGNOSIS — B35.1 DERMATOPHYTOSIS OF NAIL: Primary | ICD-10-CM

## 2018-09-27 DIAGNOSIS — E11.51 DIABETES MELLITUS WITH PERIPHERAL VASCULAR DISEASE (H): ICD-10-CM

## 2018-09-27 DIAGNOSIS — E11.49 TYPE II OR UNSPECIFIED TYPE DIABETES MELLITUS WITH NEUROLOGICAL MANIFESTATIONS, NOT STATED AS UNCONTROLLED(250.60) (H): ICD-10-CM

## 2018-09-27 NOTE — MR AVS SNAPSHOT
After Visit Summary   9/27/2018    Cristal Preciado    MRN: [id number]           Patient Information     Date Of Birth          1950        Visit Information        Provider Department      9/27/2018 2:00 PM Vu Grant DPM  [facility]        Today's Diagnoses     Dermatophytosis of nail    -  1    Type II or unspecified type diabetes mellitus with neurological manifestations, not stated as uncontrolled(250.60) (H)        Diabetes mellitus with peripheral vascular disease (H)           Follow-ups after your visit        Your next 10 appointments already scheduled     Oct 10, 2018 11:00 AM CDT   (Arrive by 10:45 AM)   RETURN DIABETES with Tamela Herrera MD   [facility] ([facility] [place])    [address]  [address] [facility] [id number] [phone]            Oct 31, 2018  9:45 AM CDT   RETURN GLAUCOMA with Bette Delong MD   [facility] ([facility])    [address]  [facility] [id number] [phone]            Jan 24, 2019  1:30 PM CST   (Arrive by 1:15 PM)   Return Visit with Vu Grant DPM   [facility] ([facility] [place])    [address]  [address] [facility] [address] [phone]              Who to contact     Please call your clinic at [phone] to:    Ask questions about your health    Make or cancel appointments    Discuss your medicines    Learn about your test results    Speak to your doctor            Additional Information About Your Visit        MyChart Information     [facility] is an electronic gateway that provides easy, online access to your medical records. With [facility], you can request a clinic appointment, read your test results, renew a prescription or communicate with your care team.     To sign up for [employer] visit the website at [email or web address]   You will be asked to enter the  access code listed below, as well as some personal information. Please follow the directions to create your username and password.     Your access code is: [email or web address]  Expires: 2018  6:30 AM     Your access code will  in 90 days. If you need help or a new code, please contact your [facility] or call [phone] for assistance.        Care EveryWhere ID     This is your Care EveryWhere ID. This could be used by other organizations to access your [place] medical records  [id number]         Blood Pressure from Last 3 Encounters:   18 139/61   09/10/18 150/90   18 147/81    Weight from Last 3 Encounters:   18 108.4 kg (239 lb)   16 106.5 kg (234 lb 12.8 oz)   03/02/15 105.7 kg (233 lb)              We Performed the Following     DEBRIDEMENT OF NAIL(S), 1-5          Today's Medication Changes          These changes are accurate as of 18  2:12 PM.  If you have any questions, ask your nurse or doctor.               These medicines have changed or have updated prescriptions.        Dose/Directions    lisinopril 5 MG tablet   Commonly known as:  PRINIVIL/ZESTRIL   This may have changed:    - how much to take  - additional instructions   Used for:  Essential hypertension, benign        Dose:  10 mg   Take 2 tablets by mouth daily. Take one tab daily/Hold for SBP < 110   Quantity:  90 tablet   Refills:  3                Primary Care Provider Office Phone # Fax #    Meagan CORNELIUS José Miguel [phone]       [facility] AFTER HOURS [address] [place] [address]        Equal Access to Services     [facility] AH: Hadii hilario prieto hadasho Soomaali, waaxda luqadaha, qaybta kaalmada adekarla, camilla ding. So [facility] [phone].    ATENCIÓN: Si habla español, tiene a reyes disposición servicios gratuitos de asistencia lingüística. Llame al [phone].    We comply with applicable federal civil rights laws and  [place] laws. We do not discriminate on the basis of race, color, national origin, age, disability, sex, sexual orientation, or gender identity.            Thank you!     Thank you for choosing [facility]  for your care. Our goal is always to provide you with excellent care. Hearing back from our patients is one way we can continue to improve our services. Please take a few minutes to complete the written survey that you may receive in the mail after your visit with us. Thank you!             Your Updated Medication List - Protect others around you: Learn how to safely use, store and throw away your medicines at www.disposemymeds.org.          This list is accurate as of 9/27/18  2:12 PM.  Always use your most recent med list.                   Brand Name Dispense Instructions for use Diagnosis    acetaminophen 500 MG tablet    TYLENOL     Take 1-2 tablets by mouth every 6 hours as needed. Tylenol Extra Strength.        allopurinol 100 MG tablet    ZYLOPRIM    180 tablet    Take 2 tablets by mouth daily.    Hyperuricemia       AMLACTIN FOOT CREAM THERAPY EX      Externally apply 12 % topically        ASPIRIN PO      Take 81 mg by mouth daily        atorvastatin 80 MG tablet    LIPITOR    90 tablet    Take 1 tablet by mouth daily. Pt needs to have labs done prior to further refills.    Other and unspecified hyperlipidemia       CALCIUM CITRATE + D PO      600/400 mg/unit take 1 tabs daily twice daily.        carboxymethylcellulose 0.5 % Soln ophthalmic solution    REFRESH PLUS     1 drop 3 times daily as needed.        CHLORTHALIDONE PO      Take 25 mg by mouth daily        CLARITIN 10 MG tablet   Generic drug:  loratadine      Take 10 mg by mouth as needed.        clobetasol 0.05 % external solution    TEMOVATE    60 mL    Apply topically 2 times daily To itchy and scaly areas of scalp as needed.    Dermatitis, seborrheic       CYMBALTA PO      Take 60 mg by mouth daily        dorzolamide-timolol 2-0.5  % ophthalmic solution    COSOPT     Place 1 drop into both eyes 2 times daily        ferrous sulfate 325 (65 Fe) MG tablet    IRON     Take 325 mg by mouth daily (with breakfast)        folic acid 1 MG tablet    FOLVITE     Take 1 mg by mouth daily.        gabapentin 600 MG tablet    NEURONTIN    90 tablet    Take 600 mg in AM , 600 mg afternoon and 900 mg at HS.    Diabetic neuropathy (H)       HUMULIN R U-500 KWIKPEN 500 UNIT/ML PEN soln   Generic drug:  insulin reg HIGH CONC     20 mL    Inject 140 units at breakfast, 140 units at lunch and 140 units at dinner.    Well controlled type 2 diabetes mellitus (H)       ibuprofen 400 MG tablet    ADVIL/MOTRIN     Take 400 mg by mouth every 6 hours as needed.        insulin pen needle 31G X 5 MM    B-D U/F    150 each    Use 5 time(s) per day.  Please dispense as BD Pen Needle Mini U/F 31G x 5 MM    Type 2 diabetes mellitus, controlled (H)       JOBST KNEE HIGH COMPRESSION SM Misc     2 each    JOBST 20-30MMHG COMPRESSION SM MISC  To both legs during waking hours daily for leg swelling and venous stasis dermatitis. Do not sleep in stockings.    Venous stasis dermatitis of both lower extremities       * ketoconazole 2 % cream    NIZORAL    60 g    Apply topically 2 times daily To affected right ear mixed with triamcinolone ointment until return to clinic.    Dermatitis       * ketoconazole 2 % shampoo    NIZORAL    240 mL    To entire wet scalp and ears and then wash off after 5 minutes three times a week.    Dermatitis, seborrheic       LANTUS SC      Inject 100 Units Subcutaneous 2 times daily WILL STOP WHEN U500 IS INITIATED        liraglutide 18 MG/3ML Soln    VICTOZA    3 Month    Inject 1.8 mg Subcutaneous daily. Start with 0.6 mg x 5 days, then increase to 1.2 mg x 5 days, then 1.8 mg thereafter.  Do not advance to higher dose if you have nausea.    Diabetes mellitus, type 2 (H)       lisinopril 5 MG tablet    PRINIVIL/ZESTRIL    90 tablet    Take 2 tablets by  mouth daily. Take one tab daily/Hold for SBP < 110    Essential hypertension, benign       LYRICA PO           metoprolol 10 mg/mL Susp    LOPRESSOR     Take 100 mg by mouth 2 times daily        mometasone 0.1 % cream    ELOCON    45 g    Apply sparingly to left medial ankle area daily.  Do not apply to face.        nitroGLYcerin 0.4 MG sublingual tablet    NITROSTAT     Place 0.4 mg under the tongue every 5 minutes as needed.        * NovoLOG VIAL 100 UNITS/ML injection   Generic drug:  insulin aspart      Will discontinue once starting U500        * insulin aspart 100 UNIT/ML injection    NovoLOG PEN     Inject 76-96 Units Subcutaneous HOLD WHEN TAKING U500        olopatadine HCl 0.2 % Soln    PATADAY    1 Bottle    Place 1 drop into both eyes daily as needed For itchy eyes    History of itching of eye       ranitidine 150 MG tablet    ZANTAC     Take 150 mg by mouth 2 times daily        sodium chloride 0.65 % nasal spray    OCEAN     Spray 1 spray in nostril daily as needed.        traMADol 50 MG tablet    ULTRAM          triamcinolone 0.1 % ointment    KENALOG    60 g    Apply topically daily To legs under compression stockings for stasis dermatitis discoloration.    Venous stasis dermatitis of both lower extremities       XALATAN 0.005 % ophthalmic solution   Generic drug:  latanoprost      1 drop At Bedtime. Left eye        * Notice:  This list has 4 medication(s) that are the same as other medications prescribed for you. Read the directions carefully, and ask your doctor or other care provider to review them with you.

## 2018-09-27 NOTE — LETTER
9/27/2018       RE: Cristal Preciado  [facility]  [address]  [place] [address]     Dear Colleague,    Thank you for referring your patient, Cristal Preciado, to the [facility] ENDOCRINOLOGY at [facility]. Please see a copy of my visit note below.    Past Medical History:   Diagnosis Date     AION (anterior ischaemic optic neuropathy), left eye     NAION LE     CAD (coronary artery disease) 3/2009    [facility]; Angio 2013 UM- normal coronary arteries     Cataract      CVA (cerebral vascular accident) (H)     admitted at [facility]     Depression     on Cymbalta     Diabetes mellitus, type 2 (H)      Diabetic nephropathy (H)      Diabetic neuropathy (H)     severe     Diabetic retinopathy (H)      GERD (gastroesophageal reflux disease)      Hyperlipidemia      Hypertension     ECHO 2013, TDS, NL EF     POAG (primary open-angle glaucoma)     adv BE     Seasonal allergies      Tubular adenoma of colon 2013    repeat colonoscopy in 2018     Patient Active Problem List   Diagnosis     Cataract     CAD (coronary artery disease)     Diabetes mellitus, type 2 (H)     Diabetic nephropathy (H)     Diabetic neuropathy (H)     Diabetic retinopathy (H)     GERD (gastroesophageal reflux disease)     Glaucoma     Hypertension     Hyperlipidemia     CVA (cerebral vascular accident) (H)     Morbid obesity (H)     Anemia     Seasonal allergies     Depression     Tubular adenoma of colon     Leg weakness     Dermatitis, seborrheic     Eczematous dermatitis     History of coronary artery disease     Venous stasis dermatitis of both lower extremities     Uncontrolled type 2 diabetes mellitus (H)     Chronic dermatitis of hands     Neoplasm of uncertain behavior of skin     Past Surgical History:   Procedure Laterality Date     COLONOSCOPY  7/15/2013    Tubular adenoma; repeat in 2018;Procedure: COMBINED COLONOSCOPY, SINGLE BIOPSY/POLYPECTOMY BY BIOPSY;;  Surgeon:  Don King MD;  Tubular adenoma     EXTRACAPSULAR CATARACT EXTRATION WITH INTRAOCULAR LENS IMPLANT  11-10-09, 2-9-10    11-10-09 Lt, 2-9-10 Rt; Left eye 8/2012     Social History     Social History     Marital status: Single     Spouse name: N/A     Number of children: N/A     Years of education: N/A     Occupational History     Not on file.     Social History Main Topics     Smoking status: Former Smoker     Quit date: 3/6/2009     Smokeless tobacco: Never Used     Alcohol use No     Drug use: No     Sexual activity: No     Other Topics Concern     Not on file     Social History Narrative    Pt has three daughters and two sons, single.  Her daughter Court, see's her often at the Nursing Home ([facility]).  Moved into Nursing Home in October 2011 after a hospitalization for ALY.  Moved from [place] in 2002 to [facility]. Has 5 grandchildren.     Family History   Problem Relation Age of Onset     Hypertension Mother      Cerebrovascular Disease Mother      Glaucoma Father      Cancer Father      Diabetes Sister      Glaucoma Sister      Cancer - colorectal Other      Cerebrovascular Disease Other      Skin Cancer No family hx of      Melanoma No family hx of    A1c                                           7.1                  3/16/2018  SUBJECTIVE FINDINGS: A 68-year-old female returns to clinic for diabetic foot care and toenail care.  Relates to no ulcers or sores since we have seen her last, peripheral neuropathy, left 2nd toenail bothers her at times and is wearing Diabetic shoes and compression socks.  Presents in her wheelchair.       OBJECTIVE FINDINGS: DP and PT are 1/4 bilaterally.  She has decreased hair growth bilaterally.  She has minimal peripheral edema bilaterally.  She relates she is wearing her compression socks.  She has incurvated, thickened, brittle nails with subungual debris, dystrophy and discoloration 1, 2 and 5 bilaterally and to a lesser degree 3 and 4 bilaterally.   There is no erythema, no drainage, no odor, no calor bilaterally.  She has decreased ankle joint dorsiflexion bilaterally, deep tendon reflexes are intact.       ASSESSMENT AND PLAN: Onychomycosis bilaterally, onychauxis bilaterally.  She is diabetic with peripheral neuropathy and vascular disease.  Diagnosis and treatment options discussed with her.  All the nails were debrided or reduced bilaterally upon consent.  Six nails were debrided.  She will return to clinic and see me in 3 months.    Again, thank you for allowing me to participate in the care of your patient.      Sincerely,    Vu Grant DPM

## 2018-09-27 NOTE — PROGRESS NOTES
Past Medical History:   Diagnosis Date     AION (anterior ischaemic optic neuropathy), left eye     NAION LE     CAD (coronary artery disease) 3/2009    [facility]; Angio 2013 UM- normal coronary arteries     Cataract      CVA (cerebral vascular accident) (H)     admitted at [facility]     on Cymbalta     Diabetes mellitus, type 2 (H)      Diabetic nephropathy (H)      Diabetic neuropathy (H)     severe     Diabetic retinopathy (H)      GERD (gastroesophageal reflux disease)      Hyperlipidemia      Hypertension     ECHO 2013, TDS, NL EF     POAG (primary open-angle glaucoma)     adv BE     Seasonal allergies      Tubular adenoma of colon 2013    repeat colonoscopy in 2018     Patient Active Problem List   Diagnosis     Cataract     CAD (coronary artery disease)     Diabetes mellitus, type 2 (H)     Diabetic nephropathy (H)     Diabetic neuropathy (H)     Diabetic retinopathy (H)     GERD (gastroesophageal reflux disease)     Glaucoma     Hypertension     Hyperlipidemia     CVA (cerebral vascular accident) (H)     Morbid obesity (H)     Anemia     Seasonal allergies     Depression     Tubular adenoma of colon     Leg weakness     Dermatitis, seborrheic     Eczematous dermatitis     History of coronary artery disease     Venous stasis dermatitis of both lower extremities     Uncontrolled type 2 diabetes mellitus (H)     Chronic dermatitis of hands     Neoplasm of uncertain behavior of skin     Past Surgical History:   Procedure Laterality Date     COLONOSCOPY  7/15/2013    Tubular adenoma; repeat in 2018;Procedure: COMBINED COLONOSCOPY, SINGLE BIOPSY/POLYPECTOMY BY BIOPSY;;  Surgeon: Don King MD;  Tubular adenoma     EXTRACAPSULAR CATARACT EXTRATION WITH INTRAOCULAR LENS IMPLANT  11-10-09, 2-9-10    11-10-09 Lt, 2-9-10 Rt; Left eye 8/2012     Social History     Social History     Marital status: Single     Spouse name: N/A     Number of children: N/A     Years of education: N/A      Occupational History     Not on file.     Social History Main Topics     Smoking status: Former Smoker     Quit date: 3/6/2009     Smokeless tobacco: Never Used     Alcohol use No     Drug use: No     Sexual activity: No     Other Topics Concern     Not on file     Social History Narrative    Pt has three daughters and two sons, single.  Her daughter Court, see's her often at the Nursing Home ([facility]).  Moved into Nursing Home in October 2011 after a hospitalization for ALY.  Moved from [place] in 2002 to [facility] [place]. Has 5 grandchildren.     Family History   Problem Relation Age of Onset     Hypertension Mother      Cerebrovascular Disease Mother      Glaucoma Father      Cancer Father      Diabetes Sister      Glaucoma Sister      Cancer - colorectal Other      Cerebrovascular Disease Other      Skin Cancer No family hx of      Melanoma No family hx of    A1c                                           7.1                  3/16/2018  SUBJECTIVE FINDINGS: A 68-year-old female returns to clinic for diabetic foot care and toenail care.  Relates to no ulcers or sores since we have seen her last, peripheral neuropathy, left 2nd toenail bothers her at times and is wearing Diabetic shoes and compression socks.  Presents in her wheelchair.       OBJECTIVE FINDINGS: DP and PT are 1/4 bilaterally.  She has decreased hair growth bilaterally.  She has minimal peripheral edema bilaterally.  She relates she is wearing her compression socks.  She has incurvated, thickened, brittle nails with subungual debris, dystrophy and discoloration 1, 2 and 5 bilaterally and to a lesser degree 3 and 4 bilaterally.  There is no erythema, no drainage, no odor, no calor bilaterally.  She has decreased ankle joint dorsiflexion bilaterally, deep tendon reflexes are intact.       ASSESSMENT AND PLAN: Onychomycosis bilaterally, onychauxis bilaterally.  She is diabetic with peripheral neuropathy and vascular disease.  Diagnosis and  treatment options discussed with her.  All the nails were debrided or reduced bilaterally upon consent.  Six nails were debrided.  She will return to clinic and see me in 3 months.

## 2018-10-10 ENCOUNTER — OFFICE VISIT (OUTPATIENT)
Dept: ENDOCRINOLOGY | Facility: CLINIC | Age: 68
End: 2018-10-10
Payer: COMMERCIAL

## 2018-10-10 VITALS
HEART RATE: 76 BPM | WEIGHT: 241 LBS | SYSTOLIC BLOOD PRESSURE: 132 MMHG | BODY MASS INDEX: 42.69 KG/M2 | DIASTOLIC BLOOD PRESSURE: 65 MMHG

## 2018-10-10 DIAGNOSIS — E11.49 TYPE II OR UNSPECIFIED TYPE DIABETES MELLITUS WITH NEUROLOGICAL MANIFESTATIONS, NOT STATED AS UNCONTROLLED(250.60) (H): Primary | ICD-10-CM

## 2018-10-10 LAB — HBA1C MFR BLD: 7.1 % (ref 4.3–6)

## 2018-10-10 NOTE — NURSING NOTE
Chief Complaint   Patient presents with     RECHECK     Type II Diabetes     Performed capillary puncture for A1C testing. Patient tolerated well.    Mana Johnson, [facility]  Endocrinology & Diabetes

## 2018-10-10 NOTE — PROGRESS NOTES
Endocrinology Clinic Visit 10/10/2018    NAME:  Cristal Preciado  PCP:  LisethskjoseAbeiglesia CORNELIUS  MRN:  [id number]  Reason for Consult:  Type 2 Diabetes  Requesting Provider:  Referred Self    Chief Complaint     Type II DM follow-up 1 month    History of Present Illness     Cristal Preciado is a 68 year old female who is seen in clinic for diabetes management.     She has had type 2 diabetes since 1984.    She has complications with neuropathy, microalbuminuria, CAD status post stent 2009, CVA.     June 2018 she was switched by Renetta Washington from basal/prandial insulin shots to TID U-500.  Her most recent endocrine visit was in 19/10/2018.  At that time she was on U500 150 units before breakfast, 140 units before lunch, 150 units before dinner.  And Victoza 1.8 mg daily.  Since last visit the patient reported that she has been feeling fine.  Appetite is the same.  She reported that she lost some weight, but per chart review her weight has been stable.  She denies chest pain, palpitation, short of breath, leg swelling.  Her blood sugar on 10/8/2018 was high in the 300-400 range, which was out of her usual blood sugar reading.  Unclear what was the cause.  She denies any party, eating more cakes or sweets that day.  She got insulin injection per schedule regularly.  She also reported that they alternate sites of insulin injection left and right every time.      In the past she was started on metformin, but this seems to be discontinued when she was in the hospital in 2011, plan was to resume but it has not been restarted.  She denies any side effects when she was on metformin.    Last HbA1c 8.3 on 9/7/2018 ([facility]).     Diabetes Care:   Eyes: last eye exam April 2018. No DR. +ve cataract, glaucoma  Kidneys: Urine Microalbumin checked at [facility] ([facility]) was 726.3 on 9/12/2018. Normal Cr.  Nerves: has peripheral neuropathy managed with gabapentin, Lyrica and Cymbalta. Sees podiatry on regular basis.   Smoking:  none  Blood Pressure: HTN on Ace inhibitor  Lipids: dyslipidemia on statin  Macrovascular: h/o stroke. CAD s/p stents, PVD    Associated Signs/Symptoms  Hypoglycemia: feels lows at <70  Hyperglycemia: increased thirst.  Neuropathy: no numbness or pain in extremities  Vascular Symtpoms: no claudication or edema  Angina/CHF: not chest pain, no LUCERO, no orthopnea  Ulcers: No  Amputations: No    Current treatment strategy: U-500: 150B/140L/150D, Victoza 1.8 mg s/c daily,     Blood Glucose Monitoring: Meter downloaded today in clinic and data reviewed as such:  Average BG:  Fasting 115-256  Before lunch 197-285  Before dinner 154-284  Before bedtime 180-312    Hypoglycemia: none    Diet:   High carbs, like bagels    Exercise: Limited due to history of CVA, she exercises with a nursing home program, she has many activities there including crafting, baking, light bowling.    Weight:   Wt Readings from Last 4 Encounters:   06/26/18 108.4 kg (239 lb)   07/20/16 106.5 kg (234 lb 12.8 oz)   03/02/15 105.7 kg (233 lb)   03/06/14 111.6 kg (246 lb)       Problem List     Patient Active Problem List   Diagnosis     Cataract     CAD (coronary artery disease)     Diabetes mellitus, type 2 (H)     Diabetic nephropathy (H)     Diabetic neuropathy (H)     Diabetic retinopathy (H)     GERD (gastroesophageal reflux disease)     Glaucoma     Hypertension     Hyperlipidemia     CVA (cerebral vascular accident) (H)     Morbid obesity (H)     Anemia     Seasonal allergies     Depression     Tubular adenoma of colon     Leg weakness     Dermatitis, seborrheic     Eczematous dermatitis     History of coronary artery disease     Venous stasis dermatitis of both lower extremities     Uncontrolled type 2 diabetes mellitus (H)     Chronic dermatitis of hands     Neoplasm of uncertain behavior of skin        Medications     Current Outpatient Prescriptions   Medication     acetaminophen (TYLENOL) 500 MG tablet     allopurinol (ZYLOPRIM) 100 MG  tablet     ASPIRIN PO     atorvastatin (LIPITOR) 80 MG tablet     Calcium Citrate-Vitamin D (CALCIUM CITRATE + D PO)     carboxymethylcellulose (REFRESH PLUS) 0.5 % SOLN     CHLORTHALIDONE PO     clobetasol (TEMOVATE) 0.05 % external solution     dorzolamide-timolol (COSOPT) 2-0.5 % ophthalmic solution     DULoxetine HCl (CYMBALTA PO)     Elastic Bandages & Supports (JOBST KNEE HIGH COMPRESSION SM) MISC     ferrous sulfate (IRON) 325 (65 FE) MG tablet     folic acid (FOLVITE) 1 MG tablet     gabapentin (NEURONTIN) 600 MG tablet     ibuprofen (ADVIL,MOTRIN) 400 MG tablet     insulin aspart (NOVOLOG PEN) 100 UNIT/ML injection     insulin aspart (NOVOLOG VIAL) 100 UNITS/ML injection     Insulin Glargine (LANTUS SC)     insulin pen needle (B-D U/F) 31G X 5 MM     insulin reg HIGH CONC (HUMULIN R U-500 KWIKPEN) 500 UNIT/ML PEN soln     ketoconazole (NIZORAL) 2 % cream     ketoconazole (NIZORAL) 2 % shampoo     latanoprost (XALATAN) 0.005 % ophthalmic solution     liraglutide (VICTOZA) 18 MG/3ML SOLN     lisinopril (PRINIVIL,ZESTRIL) 5 MG tablet     loratadine (CLARITIN) 10 MG tablet     metoprolol (LOPRESSOR) 10 mg/mL SUSP     mometasone (ELOCON) 0.1 % cream     nitroGLYCERIN (NITROSTAT) 0.4 MG SL tablet     olopatadine HCl (PATADAY) 0.2 % SOLN     Podiatric Products (AMLACTIN FOOT CREAM THERAPY EX)     Pregabalin (LYRICA PO)     ranitidine (ZANTAC) 150 MG tablet     sodium chloride (SODIUM CHLORIDE) 0.65 % nasal spray     traMADol (ULTRAM) 50 MG tablet     triamcinolone (KENALOG) 0.1 % ointment     No current facility-administered medications for this visit.         Allergies     Allergies   Allergen Reactions     Dust Mites Other (See Comments)     Sneezing runny eyes and nose.     Food Allergy Formula Hives     Mountain Dew and Walnuts     Pollen Extract Other (See Comments)     Sneezing runny eyes and nose.       Medical / Surgical History     Past Medical History:   Diagnosis Date     AION (anterior ischaemic optic  neuropathy), left eye     NAION LE     CAD (coronary artery disease) 3/2009    [facility]; Angio 2013 UM- normal coronary arteries     Cataract      CVA (cerebral vascular accident) (H)     admitted at [facility]     on Cymbalta     Diabetes mellitus, type 2 (H)      Diabetic nephropathy (H)      Diabetic neuropathy (H)     severe     Diabetic retinopathy (H)      GERD (gastroesophageal reflux disease)      Hyperlipidemia      Hypertension     ECHO 2013, TDS, NL EF     POAG (primary open-angle glaucoma)     adv BE     Seasonal allergies      Tubular adenoma of colon 2013    repeat colonoscopy in 2018     Past Surgical History:   Procedure Laterality Date     COLONOSCOPY  7/15/2013    Tubular adenoma; repeat in 2018;Procedure: COMBINED COLONOSCOPY, SINGLE BIOPSY/POLYPECTOMY BY BIOPSY;;  Surgeon: Don King MD;  Tubular adenoma     EXTRACAPSULAR CATARACT EXTRATION WITH INTRAOCULAR LENS IMPLANT  11-10-09, 2-9-10    11-10-09 Lt, 2-9-10 Rt; Left eye 8/2012       Social History     Social History     Social History     Marital status: Single     Spouse name: N/A     Number of children: N/A     Years of education: N/A     Occupational History     Not on file.     Social History Main Topics     Smoking status: Former Smoker     Quit date: 3/6/2009     Smokeless tobacco: Never Used     Alcohol use No     Drug use: No     Sexual activity: No     Other Topics Concern     Not on file     Social History Narrative    Pt has three daughters and two sons, single.  Her daughter Court, see's her often at the Nursing Home ([facility]).  Moved into Nursing Home in October 2011 after a hospitalization for ALY.  Moved from [place] in 2002 to \[facility]". Has 5 grandchildren.       Family History     Family History   Problem Relation Age of Onset     Hypertension Mother      Cerebrovascular Disease Mother      Glaucoma Father      Cancer Father      Diabetes Sister      Glaucoma Sister      Cancer  - colorectal Other      Cerebrovascular Disease Other      Skin Cancer No family hx of      Melanoma No family hx of        ROS     Constitutional: no fevers, chills, night sweats. No weight loss or fatigue. Good appetite  Eyes: no vision changes, no eye redness, no diplopia  Ears, Nose, mouth, throat: no hearing changes, no tinnitus, no rhinorrhea, no nasal congestion  Cardiovascular: no chest pain, no orthopnea or PND, no edema, no palpitations  Respiratory: no dyspnea, no cough, no sputum, no wheezing  Gastrointestinal: no nausea, no vomiting, no abdominal pain, no diarrhea, no constipation  Genitourinary: no dysuria, no frequency, no urgency, no nocturia  Musculoskeletal: no joint pains, no back pain, no cramps, no fractures  Skin: no rash, no itching, no dryness, no ulcers, no hair loss  Neurological: no headache, no weakness, no numbness, no dizziness, no tremors  Psychiatric: no anxiety, no sadness  Hematologic/lymphatic: no easy bruising, no bleeding, no palor    Physical Exam   /65 (BP Location: Right arm)  Pulse 76  Wt 109.3 kg (241 lb)  BMI 42.69 kg/m2     General: Comfortable, no obvious distress, normal body habitus  Eyes: Sclera anicteric, moist conjunctiva  HENT: Atraumatic, oropharynx clear, moist mucous membranes with no mucosal ulcerations  Neck: Trachea midline, supple. Thyroid: Thyroid is normal in size and texture  CV: Regular rhythm, normal rate. No murmurs auscultated  Resp: Clear to auscultation bilaterally, good effort  Abdomen:  Soft, non tender, non distended. Bowel sounds heard. No organomegaly. No palpable abdominal scar  Skin: No rashes, lesions, or subcutaneous nodules.   Psych: Alert and oriented x 3. Appropriate affect, good insight  Extremities: No peripheral edema  Foot exam:  Pulses:  Dorsalis pedis: present bilaterally  Posterior tibial: present bilaterally  Foot exam:   Skin of foot: intact bilaterally  Musculoskeletal: Appropriate muscle bulk and strength  Lymphatic:  No cervical lymphadenopathy  Neuro: Moves all four extremities. No focal deficits on limited exam. Gait normal.       Labs/Imaging and Outside Records     Pertinent Labs were reviewed and updated in EPIC and reviewed.    Summary of recent findings:   Lab Results   Component Value Date    A1C 7.5 10/24/2016    A1C 7.5 10/12/2015    A1C 8.1 03/06/2014    A1C 7.2 03/07/2013    A1C 7.7 02/13/2013       TSH   Date Value Ref Range Status   10/24/2016 1.20 0.40 - 4.00 mU/L Final   10/12/2015 1.18 0.40 - 4.00 mU/L Final   08/21/2014 1.24 0.40 - 4.00 mU/L Final     Comment:     Effective 7/30/2014, the reference range for this assay has changed to reflect   new instrumentation/methodology.     08/05/2013 1.12 0.4 - 5.0 mU/L Final   07/15/2010 0.53 0.4 - 5.0 mU/L Final     T4 Total   Date Value Ref Range Status   10/30/2008 10.6 5.0 - 11.0 ug/dL Final     T4 Free   Date Value Ref Range Status   04/21/2006 1.04 0.70 - 1.85 ng/dL Final   09/23/2005 1.58 0.70 - 1.85 ng/dL Final       Creatinine   Date Value Ref Range Status   03/19/2018 1.10 0.5 - 1.10 mg/dL Final       Recent Labs   Lab Test 03/19/18  10/24/16   1301  10/12/15   1626  08/21/14   0935   05/10/12   1022   CHOL  109   --    --   110   --   144   HDL   --    --    --   43*   --   46*   LDL   --   48  51  43   < >  73   TRIG   --    --    --   122   --   123   CHOLHDLRATIO   --    --    --   2.6   --   3.1    < > = values in this interval not displayed.     I personally reviewed the patient's outside records from [facility] and faxed records. Summary of pertinent findings in [facility].    Impression / Plan     1. Diabetes Mellitus: Type 2  Current glycemic control can be considered fair.  It has stayed the same since last visit.   Her A1c today is 7.1.  Her A1c last month was 8.3.  From reviewing her blood sugar her average were in the mid 200s.  She is also anemic, her baseline hemoglobin is 9.  So hemoglobin A1c for her cannot be used as a representative of her  blood sugar control.  We will start her on metformin today, to prevent increasing insulin that may cause more weight gain.  At this point she is already on Victoza 1.8 mg daily, we could also increase to 3 mg daily in the future if the insurance allowed, to help with both weight loss and BG control.     I discussed dietary concepts today with the patient, including: general nutrition guidelines and weight reduction.     -Continue U-500: 150B/140L/150D  - Continue Victoza 1.8 mg s/c daily,   - Start Metformin 500 mg tablet titration as below:  Week 1: Take 1 tablet in the morning  Week 2: Take 1 tablet in the morning and 1 tablet in the evening  Week 3: Take 2 tablets in the morning and 1 tablet in the evening  Week 4: Take 2 tablets in the morning and 2 tablets in the evening  Maintenance Dose: 2 tablets twice a day  Always take with meals.      2. Diabetes Complications: With nephropathy, peripheral neuropathy, cardiovascular disease and cerebrovascular disease.     3. Blood Pressure Management: Blood pressure is controlled. Currently is on pharmacotherapy for this.     4.Lipid Management: Per the new ACC/MIKE/NHLBI guidelines, statins are recommended for individuals with diabetes aged 40-75 with LDL  without ASCVD, and for any individual with ASCVD. Currently the patient is on a statin.     5. Smoking Status: Patient Pt is smoke free..     Test and/or medications prescribed today:  Orders Placed This Encounter   Procedures     Hemoglobin A1c POCT       Follow up: in 3 months     Patient was seen and discussed with .    Deepak Elizondo MD  Endocrine fellow      Physician Attestation   I, Tamela Herrera, saw this patient with the fellow and agree with the fellow's findings and plan of care as documented in the resident s note.      I personally reviewed vital signs, medications and labs.    Key findings: sub-optimal control. Will add metformin. Next visit, we may try to increase victoza if needed.      Tamela Herrera  Date of Service (when I saw the patient): 10/10/18

## 2018-10-10 NOTE — PATIENT INSTRUCTIONS
Orders; start Metformin:     Metformin Start Instructions.  Week 1: Take 1 tablet in the morning  Week 2: Take 1 tablet in the morning and 1 tablet in the evening  Week 3: Take 2 tablets in the morning and 1 tablet in the evening  Week 4: Take 2 tablets in the morning and 2 tablets in the evening  Maintenance Dose: 2 tablets twice a day  Always take with meals.      Tamela Herrera MD  Endocrinology, Diabetes and Metabolism  [facility]

## 2018-10-10 NOTE — MR AVS SNAPSHOT
After Visit Summary   10/10/2018    Cristal Preciado    MRN: [id number]           Patient Information     Date Of Birth          1950        Visit Information        Provider Department      10/10/2018 11:00 AM Tamela Herrera MD [facility]        Today's Diagnoses     Uncontrolled type 2 diabetes mellitus (H)    -  1      Care Instructions    Orders; start Metformin:     Metformin Start Instructions.  Week 1: Take 1 tablet in the morning  Week 2: Take 1 tablet in the morning and 1 tablet in the evening  Week 3: Take 2 tablets in the morning and 1 tablet in the evening  Week 4: Take 2 tablets in the morning and 2 tablets in the evening  Maintenance Dose: 2 tablets twice a day  Always take with meals.      Tamela Herrera MD  Endocrinology, Diabetes and Metabolism  [facility]            Follow-ups after your visit        Your next 10 appointments already scheduled     Oct 31, 2018  9:45 AM CDT   RETURN GLAUCOMA with Bette Delong MD   [facility] ([facility])    [address]  [facility] [address]   [phone]            Jan 11, 2019  9:30 AM CST   (Arrive by 9:15 AM)   RETURN DIABETES with Tamela Herrera MD   [facility] ([facility])    [address] [place] [id number] [phone]            Jan 24, 2019  1:30 PM CST   (Arrive by 1:15 PM)   Return Visit with Vu Grant DPM   [facility] ([facility])    [address] [place] [id number] [phone]              Who to contact     Please call your clinic at [phone] to:    Ask questions about your health    Make or cancel appointments    Discuss your medicines    Learn about your test results    Speak to your doctor            Additional Information About Your Visit        Care EveryWhere ID     This is your Care  EveryWhere ID. This could be used by other organizations to access your [facility] medical records  [id number]        Your Vitals Were     Pulse BMI (Body Mass Index)                76 42.69 kg/m2           Blood Pressure from Last 3 Encounters:   10/10/18 132/65   09/14/18 139/61   09/10/18 150/90    Weight from Last 3 Encounters:   10/10/18 109.3 kg (241 lb)   06/26/18 108.4 kg (239 lb)   07/20/16 106.5 kg (234 lb 12.8 oz)              We Performed the Following     Hemoglobin A1c POCT          Today's Medication Changes          These changes are accurate as of 10/10/18 11:42 AM.  If you have any questions, ask your nurse or doctor.               Start taking these medicines.        Dose/Directions    metFORMIN 500 MG tablet   Commonly known as:  GLUCOPHAGE   Used for:  Uncontrolled type 2 diabetes mellitus (H)   Started by:  Tamela Herrera MD        Week 1: 1 tab in AM. Week 2: 1 tab in AM and 1 tab in PM. Week 3: 2 tab in AM and 1 tab in PM. Week 4 and After: 2 tab in AM and 2 tab in PM   Quantity:  360 tablet   Refills:  3         These medicines have changed or have updated prescriptions.        Dose/Directions    lisinopril 5 MG tablet   Commonly known as:  PRINIVIL/ZESTRIL   This may have changed:    - how much to take  - additional instructions   Used for:  Essential hypertension, benign        Dose:  10 mg   Take 2 tablets by mouth daily. Take one tab daily/Hold for SBP < 110   Quantity:  90 tablet   Refills:  3            Where to get your medicines      These medications were sent to [id number] - [place], [place] - [address] [facility]  [address] [address]     Phone:  [phone]     metFORMIN 500 MG tablet                Primary Care Provider Office Phone # Fax #    Meagan Valdez [phone]       [facility] [address] [place] [address]        Equal Access to Services     PASCALE JANESN AH: Tommy prieto  judah Carvalho, waashleyda lucorinaadaha, qamelanieta kaemily jose, camilla lu kinsundar shaeanjelica lakhoijade toña. So [facility] [phone].    ATENCIÓN: Si martinla nay, tiene a reyes disposición servicios gratuitos de asistencia lingüística. Pierre al [phone].    We comply with applicable federal civil rights laws and [place] laws. We do not discriminate on the basis of race, color, national origin, age, disability, sex, sexual orientation, or gender identity.            Thank you!     Thank you for choosing [facility] ENDOCRIN[facility]  for your care. Our goal is always to provide you with excellent care. Hearing back from our patients is one way we can continue to improve our services. Please take a few minutes to complete the written survey that you may receive in the mail after your visit with us. Thank you!             Your Updated Medication List - Protect others around you: Learn how to safely use, store and throw away your medicines at www.disposemymeds.org.          This list is accurate as of 10/10/18 11:42 AM.  Always use your most recent med list.                   Brand Name Dispense Instructions for use Diagnosis    acetaminophen 500 MG tablet    TYLENOL     Take 1-2 tablets by mouth every 6 hours as needed. Tylenol Extra Strength.        allopurinol 100 MG tablet    ZYLOPRIM    180 tablet    Take 2 tablets by mouth daily.    Hyperuricemia       AMLACTIN FOOT CREAM THERAPY EX      Externally apply 12 % topically        ASPIRIN PO      Take 81 mg by mouth daily        atorvastatin 80 MG tablet    LIPITOR    90 tablet    Take 1 tablet by mouth daily. Pt needs to have labs done prior to further refills.    Other and unspecified hyperlipidemia       CALCIUM CITRATE + D PO      600/400 mg/unit take 1 tabs daily twice daily.        carboxymethylcellulose 0.5 % Soln ophthalmic solution    REFRESH PLUS     1 drop 3 times daily as needed.        CHLORTHALIDONE PO      Take 25 mg by mouth daily        CLARITIN 10 MG tablet    Generic drug:  loratadine      Take 10 mg by mouth as needed.        clobetasol 0.05 % external solution    TEMOVATE    60 mL    Apply topically 2 times daily To itchy and scaly areas of scalp as needed.    Dermatitis, seborrheic       CYMBALTA PO      Take 60 mg by mouth daily        dorzolamide-timolol 2-0.5 % ophthalmic solution    COSOPT     Place 1 drop into both eyes 2 times daily        ferrous sulfate 325 (65 Fe) MG tablet    IRON     Take 325 mg by mouth daily (with breakfast)        folic acid 1 MG tablet    FOLVITE     Take 1 mg by mouth daily.        gabapentin 600 MG tablet    NEURONTIN    90 tablet    Take 600 mg in AM , 600 mg afternoon and 900 mg at HS.    Diabetic neuropathy (H)       HUMULIN R U-500 KWIKPEN 500 UNIT/ML PEN soln   Generic drug:  insulin reg HIGH CONC     20 mL    Inject 140 units at breakfast, 140 units at lunch and 140 units at dinner.    Well controlled type 2 diabetes mellitus (H)       ibuprofen 400 MG tablet    ADVIL/MOTRIN     Take 400 mg by mouth every 6 hours as needed.        insulin pen needle 31G X 5 MM    B-D U/F    150 each    Use 5 time(s) per day.  Please dispense as BD Pen Needle Mini U/F 31G x 5 MM    Type 2 diabetes mellitus, controlled (H)       JOBST KNEE HIGH COMPRESSION SM Misc     2 each    JOBST 20-30MMHG COMPRESSION SM MISC  To both legs during waking hours daily for leg swelling and venous stasis dermatitis. Do not sleep in stockings.    Venous stasis dermatitis of both lower extremities       * ketoconazole 2 % cream    NIZORAL    60 g    Apply topically 2 times daily To affected right ear mixed with triamcinolone ointment until return to clinic.    Dermatitis       * ketoconazole 2 % shampoo    NIZORAL    240 mL    To entire wet scalp and ears and then wash off after 5 minutes three times a week.    Dermatitis, seborrheic       LANTUS SC      Inject 100 Units Subcutaneous 2 times daily WILL STOP WHEN U500 IS INITIATED        liraglutide 18 MG/3ML Soln     VICTOZA    3 Month    Inject 1.8 mg Subcutaneous daily. Start with 0.6 mg x 5 days, then increase to 1.2 mg x 5 days, then 1.8 mg thereafter.  Do not advance to higher dose if you have nausea.    Diabetes mellitus, type 2 (H)       lisinopril 5 MG tablet    PRINIVIL/ZESTRIL    90 tablet    Take 2 tablets by mouth daily. Take one tab daily/Hold for SBP < 110    Essential hypertension, benign       LYRICA PO           metFORMIN 500 MG tablet    GLUCOPHAGE    360 tablet    Week 1: 1 tab in AM. Week 2: 1 tab in AM and 1 tab in PM. Week 3: 2 tab in AM and 1 tab in PM. Week 4 and After: 2 tab in AM and 2 tab in PM    Uncontrolled type 2 diabetes mellitus (H)       metoprolol 10 mg/mL Susp    LOPRESSOR     Take 100 mg by mouth 2 times daily        mometasone 0.1 % cream    ELOCON    45 g    Apply sparingly to left medial ankle area daily.  Do not apply to face.        nitroGLYcerin 0.4 MG sublingual tablet    NITROSTAT     Place 0.4 mg under the tongue every 5 minutes as needed.        * NovoLOG VIAL 100 UNITS/ML injection   Generic drug:  insulin aspart      Will discontinue once starting U500        * insulin aspart 100 UNIT/ML injection    NovoLOG PEN     Inject 76-96 Units Subcutaneous HOLD WHEN TAKING U500        olopatadine HCl 0.2 % Soln    PATADAY    1 Bottle    Place 1 drop into both eyes daily as needed For itchy eyes    History of itching of eye       ranitidine 150 MG tablet    ZANTAC     Take 150 mg by mouth 2 times daily        sodium chloride 0.65 % nasal spray    OCEAN     Spray 1 spray in nostril daily as needed.        traMADol 50 MG tablet    ULTRAM          triamcinolone 0.1 % ointment    KENALOG    60 g    Apply topically daily To legs under compression stockings for stasis dermatitis discoloration.    Venous stasis dermatitis of both lower extremities       XALATAN 0.005 % ophthalmic solution   Generic drug:  latanoprost      1 drop At Bedtime. Left eye        * Notice:  This list has 4  medication(s) that are the same as other medications prescribed for you. Read the directions carefully, and ask your doctor or other care provider to review them with you.

## 2018-10-10 NOTE — LETTER
10/10/2018     RE: Cristal Preciado  [facility]  [address]  [place] [address]     Dear Colleague,    Thank you for referring your patient, Cristal Preciado, to the [facility] ENDOCRINOLOGY at [facility]. Please see a copy of my visit note below.    Endocrinology Clinic Visit 10/10/2018    NAME:  Cristal Preciado  PCP:  Meagan Valdez  MRN:  [id number]  Reason for Consult:  Type 2 Diabetes  Requesting Provider:  Referred Self    Chief Complaint     Type II DM follow-up 1 month    History of Present Illness     Cristal Preciado is a 68 year old female who is seen in clinic for diabetes management.     She has had type 2 diabetes since 1984.    She has complications with neuropathy, microalbuminuria, CAD status post stent 2009, CVA.     June 2018 she was switched by Renetta Washington from basal/prandial insulin shots to TID U-500.  Her most recent endocrine visit was in 19/10/2018.  At that time she was on U500 150 units before breakfast, 140 units before lunch, 150 units before dinner.  And Victoza 1.8 mg daily.  Since last visit the patient reported that she has been feeling fine.  Appetite is the same.  She reported that she lost some weight, but per chart review her weight has been stable.  She denies chest pain, palpitation, short of breath, leg swelling.  Her blood sugar on 10/8/2018 was high in the 300-400 range, which was out of her usual blood sugar reading.  Unclear what was the cause.  She denies any party, eating more cakes or sweets that day.  She got insulin injection per schedule regularly.  She also reported that they alternate sites of insulin injection left and right every time.      In the past she was started on metformin, but this seems to be discontinued when she was in the hospital in 2011, plan was to resume but it has not been restarted.  She denies any side effects when she was on metformin.    Last HbA1c 8.3 on 9/7/2018 [facility]     Diabetes Care:   Eyes: last eye exam April 2018. No DR. +ve cataract, glaucoma  Kidneys: Urine Microalbumin checked at [facility] was 726.3 on 9/12/2018. Normal Cr.  Nerves: has peripheral neuropathy managed with gabapentin, Lyrica and Cymbalta. Sees podiatry on regular basis.   Smoking: none  Blood Pressure: HTN on Ace inhibitor  Lipids: dyslipidemia on statin  Macrovascular: h/o stroke. CAD s/p stents, PVD    Associated Signs/Symptoms  Hypoglycemia: feels lows at <70  Hyperglycemia: increased thirst.  Neuropathy: no numbness or pain in extremities  Vascular Symtpoms: no claudication or edema  Angina/CHF: not chest pain, no LUCERO, no orthopnea  Ulcers: No  Amputations: No    Current treatment strategy: U-500: 150B/140L/150D, Victoza 1.8 mg s/c daily,     Blood Glucose Monitoring: Meter downloaded today in clinic and data reviewed as such:  Average BG:  Fasting 115-256  Before lunch 197-285  Before dinner 154-284  Before bedtime 180-312    Hypoglycemia: none    Diet:   High carbs, like bagels    Exercise: Limited due to history of CVA, she exercises with a nursing home program, she has many activities there including crafting, baking, light bowling.    Weight:   Wt Readings from Last 4 Encounters:   06/26/18 108.4 kg (239 lb)   07/20/16 106.5 kg (234 lb 12.8 oz)   03/02/15 105.7 kg (233 lb)   03/06/14 111.6 kg (246 lb)       Problem List     Patient Active Problem List   Diagnosis     Cataract     CAD (coronary artery disease)     Diabetes mellitus, type 2 (H)     Diabetic nephropathy (H)     Diabetic neuropathy (H)     Diabetic retinopathy (H)     GERD (gastroesophageal reflux disease)     Glaucoma     Hypertension     Hyperlipidemia     CVA (cerebral vascular accident) (H)     Morbid obesity (H)     Anemia     Seasonal allergies     Depression     Tubular adenoma of colon     Leg weakness     Dermatitis, seborrheic     Eczematous dermatitis     History of coronary artery disease      Venous stasis dermatitis of both lower extremities     Uncontrolled type 2 diabetes mellitus (H)     Chronic dermatitis of hands     Neoplasm of uncertain behavior of skin        Medications     Current Outpatient Prescriptions   Medication     acetaminophen (TYLENOL) 500 MG tablet     allopurinol (ZYLOPRIM) 100 MG tablet     ASPIRIN PO     atorvastatin (LIPITOR) 80 MG tablet     Calcium Citrate-Vitamin D (CALCIUM CITRATE + D PO)     carboxymethylcellulose (REFRESH PLUS) 0.5 % SOLN     CHLORTHALIDONE PO     clobetasol (TEMOVATE) 0.05 % external solution     dorzolamide-timolol (COSOPT) 2-0.5 % ophthalmic solution     DULoxetine HCl (CYMBALTA PO)     Elastic Bandages & Supports (JOBST KNEE HIGH COMPRESSION SM) MISC     ferrous sulfate (IRON) 325 (65 FE) MG tablet     folic acid (FOLVITE) 1 MG tablet     gabapentin (NEURONTIN) 600 MG tablet     ibuprofen (ADVIL,MOTRIN) 400 MG tablet     insulin aspart (NOVOLOG PEN) 100 UNIT/ML injection     insulin aspart (NOVOLOG VIAL) 100 UNITS/ML injection     Insulin Glargine (LANTUS SC)     insulin pen needle (B-D U/F) 31G X 5 MM     insulin reg HIGH CONC (HUMULIN R U-500 KWIKPEN) 500 UNIT/ML PEN soln     ketoconazole (NIZORAL) 2 % cream     ketoconazole (NIZORAL) 2 % shampoo     latanoprost (XALATAN) 0.005 % ophthalmic solution     liraglutide (VICTOZA) 18 MG/3ML SOLN     lisinopril (PRINIVIL,ZESTRIL) 5 MG tablet     loratadine (CLARITIN) 10 MG tablet     metoprolol (LOPRESSOR) 10 mg/mL SUSP     mometasone (ELOCON) 0.1 % cream     nitroGLYCERIN (NITROSTAT) 0.4 MG SL tablet     olopatadine HCl (PATADAY) 0.2 % SOLN     Podiatric Products (AMLACTIN FOOT CREAM THERAPY EX)     Pregabalin (LYRICA PO)     ranitidine (ZANTAC) 150 MG tablet     sodium chloride (SODIUM CHLORIDE) 0.65 % nasal spray     traMADol (ULTRAM) 50 MG tablet     triamcinolone (KENALOG) 0.1 % ointment     No current facility-administered medications for this visit.         Allergies     Allergies   Allergen  Reactions     Dust Mites Other (See Comments)     Sneezing runny eyes and nose.     Food Allergy Formula Hives     Mountain Dew and Walnuts     Pollen Extract Other (See Comments)     Sneezing runny eyes and nose.       Medical / Surgical History     Past Medical History:   Diagnosis Date     AION (anterior ischaemic optic neuropathy), left eye     NAION LE     CAD (coronary artery disease) 3/2009    [facility]; Angio 2013 UM- normal coronary arteries     Cataract      CVA (cerebral vascular accident) (H)     admitted at [facility]     on Cymbalta     Diabetes mellitus, type 2 (H)      Diabetic nephropathy (H)      Diabetic neuropathy (H)     severe     Diabetic retinopathy (H)      GERD (gastroesophageal reflux disease)      Hyperlipidemia      Hypertension     ECHO 2013, TDS, NL EF     POAG (primary open-angle glaucoma)     adv BE     Seasonal allergies      Tubular adenoma of colon 2013    repeat colonoscopy in 2018     Past Surgical History:   Procedure Laterality Date     COLONOSCOPY  7/15/2013    Tubular adenoma; repeat in 2018;Procedure: COMBINED COLONOSCOPY, SINGLE BIOPSY/POLYPECTOMY BY BIOPSY;;  Surgeon: Don King MD;  Tubular adenoma     EXTRACAPSULAR CATARACT EXTRATION WITH INTRAOCULAR LENS IMPLANT  11-10-09, 2-9-10    11-10-09 Lt, 2-9-10 Rt; Left eye 8/2012       Social History     Social History     Social History     Marital status: Single     Spouse name: N/A     Number of children: N/A     Years of education: N/A     Occupational History     Not on file.     Social History Main Topics     Smoking status: Former Smoker     Quit date: 3/6/2009     Smokeless tobacco: Never Used     Alcohol use No     Drug use: No     Sexual activity: No     Other Topics Concern     Not on file     Social History Narrative    Pt has three daughters and two sons, single.  Her daughter Court, see's her often at the [facility] [employer]  Moved into Nursing Home in October 2011  after a hospitalization for ALY.  Moved from [place] in 2002 to [facility]. Has 5 grandchildren.       Family History     Family History   Problem Relation Age of Onset     Hypertension Mother      Cerebrovascular Disease Mother      Glaucoma Father      Cancer Father      Diabetes Sister      Glaucoma Sister      Cancer - colorectal Other      Cerebrovascular Disease Other      Skin Cancer No family hx of      Melanoma No family hx of        ROS     Constitutional: no fevers, chills, night sweats. No weight loss or fatigue. Good appetite  Eyes: no vision changes, no eye redness, no diplopia  Ears, Nose, mouth, throat: no hearing changes, no tinnitus, no rhinorrhea, no nasal congestion  Cardiovascular: no chest pain, no orthopnea or PND, no edema, no palpitations  Respiratory: no dyspnea, no cough, no sputum, no wheezing  Gastrointestinal: no nausea, no vomiting, no abdominal pain, no diarrhea, no constipation  Genitourinary: no dysuria, no frequency, no urgency, no nocturia  Musculoskeletal: no joint pains, no back pain, no cramps, no fractures  Skin: no rash, no itching, no dryness, no ulcers, no hair loss  Neurological: no headache, no weakness, no numbness, no dizziness, no tremors  Psychiatric: no anxiety, no sadness  Hematologic/lymphatic: no easy bruising, no bleeding, no palor    Physical Exam   /65 (BP Location: Right arm)  Pulse 76  Wt 109.3 kg (241 lb)  BMI 42.69 kg/m2     General: Comfortable, no obvious distress, normal body habitus  Eyes: Sclera anicteric, moist conjunctiva  HENT: Atraumatic, oropharynx clear, moist mucous membranes with no mucosal ulcerations  Neck: Trachea midline, supple. Thyroid: Thyroid is normal in size and texture  CV: Regular rhythm, normal rate. No murmurs auscultated  Resp: Clear to auscultation bilaterally, good effort  Abdomen:  Soft, non tender, non distended. Bowel sounds heard. No organomegaly. No palpable abdominal scar  Skin: No rashes, lesions, or  subcutaneous nodules.   Psych: Alert and oriented x 3. Appropriate affect, good insight  Extremities: No peripheral edema  Foot exam:  Pulses:  Dorsalis pedis: present bilaterally  Posterior tibial: present bilaterally  Foot exam:   Skin of foot: intact bilaterally  Musculoskeletal: Appropriate muscle bulk and strength  Lymphatic: No cervical lymphadenopathy  Neuro: Moves all four extremities. No focal deficits on limited exam. Gait normal.       Labs/Imaging and Outside Records     Pertinent Labs were reviewed and updated in EPIC and reviewed.    Summary of recent findings:   Lab Results   Component Value Date    A1C 7.5 10/24/2016    A1C 7.5 10/12/2015    A1C 8.1 03/06/2014    A1C 7.2 03/07/2013    A1C 7.7 02/13/2013       TSH   Date Value Ref Range Status   10/24/2016 1.20 0.40 - 4.00 mU/L Final   10/12/2015 1.18 0.40 - 4.00 mU/L Final   08/21/2014 1.24 0.40 - 4.00 mU/L Final     Comment:     Effective 7/30/2014, the reference range for this assay has changed to reflect   new instrumentation/methodology.     08/05/2013 1.12 0.4 - 5.0 mU/L Final   07/15/2010 0.53 0.4 - 5.0 mU/L Final     T4 Total   Date Value Ref Range Status   10/30/2008 10.6 5.0 - 11.0 ug/dL Final     T4 Free   Date Value Ref Range Status   04/21/2006 1.04 0.70 - 1.85 ng/dL Final   09/23/2005 1.58 0.70 - 1.85 ng/dL Final       Creatinine   Date Value Ref Range Status   03/19/2018 1.10 0.5 - 1.10 mg/dL Final       Recent Labs   Lab Test 03/19/18  10/24/16   1301  10/12/15   1626  08/21/14   0935   05/10/12   1022   CHOL  109   --    --   110   --   144   HDL   --    --    --   43*   --   46*   LDL   --   48  51  43   < >  73   TRIG   --    --    --   122   --   123   CHOLHDLRATIO   --    --    --   2.6   --   3.1    < > = values in this interval not displayed.     I personally reviewed the patient's outside records from [facility] and faxed records. Summary of pertinent findings in [facility].    Impression / Plan     1. Diabetes Mellitus: Type  2  Current glycemic control can be considered fair.  It has stayed the same since last visit.   Her A1c today is 7.1.  Her A1c last month was 8.3.  From reviewing her blood sugar her average were in the mid 200s.  She is also anemic, her baseline hemoglobin is 9.  So hemoglobin A1c for her cannot be used as a representative of her blood sugar control.  We will start her on metformin today, to prevent increasing insulin that may cause more weight gain.  At this point she is already on Victoza 1.8 mg daily, we could also increase to 3 mg daily in the future if the insurance allowed, to help with both weight loss and BG control.     I discussed dietary concepts today with the patient, including: general nutrition guidelines and weight reduction.     -Continue U-500: 150B/140L/150D  - Continue Victoza 1.8 mg s/c daily,   - Start Metformin 500 mg tablet titration as below:  Week 1: Take 1 tablet in the morning  Week 2: Take 1 tablet in the morning and 1 tablet in the evening  Week 3: Take 2 tablets in the morning and 1 tablet in the evening  Week 4: Take 2 tablets in the morning and 2 tablets in the evening  Maintenance Dose: 2 tablets twice a day  Always take with meals.      2. Diabetes Complications: With nephropathy, peripheral neuropathy, cardiovascular disease and cerebrovascular disease.     3. Blood Pressure Management: Blood pressure is controlled. Currently is on pharmacotherapy for this.     4.Lipid Management: Per the new ACC/MIKE/NHLBI guidelines, statins are recommended for individuals with diabetes aged 40-75 with LDL  without ASCVD, and for any individual with ASCVD. Currently the patient is on a statin.     5. Smoking Status: Patient Pt is smoke free..     Test and/or medications prescribed today:  Orders Placed This Encounter   Procedures     Hemoglobin A1c POCT       Follow up: in 3 months     Patient was seen and discussed with .    Deepak Elizondo MD  Endocrine fellow      Physician  Attestation   I, Tamela Herrera, saw this patient with the fellow and agree with the fellow's findings and plan of care as documented in the resident s note.      I personally reviewed vital signs, medications and labs.    Key findings: sub-optimal control. Will add metformin. Next visit, we may try to increase victoza if needed.     Tamela Herrera  Date of Service (when I saw the patient): 10/10/18

## 2018-10-25 ENCOUNTER — TELEPHONE (OUTPATIENT)
Dept: ENDOCRINOLOGY | Facility: CLINIC | Age: 68
End: 2018-10-25

## 2018-10-25 NOTE — TELEPHONE ENCOUNTER
10/24/18    I spoke to MOSES Blackwell at [facility] about Cristal's blood sugars.  She is up to 1000 mg/day of Metformin and she had a low blood sugar of 64 mg/dl at  on 10/22.  Blood sugars otherwise have been very well controlled.  No other hypoglycemia.  Currently taking U500- 135B/125L/135D.  Advised to reduce further to 120B/110L/120D as we will be advancing Metformin further to 1500 mg in 2 days.  She will send another update tomorrow, sooner with hypoglycemia. Goal is to gradually reduce insulin and possibly go to just twice daily U500.  Will send weekly updates.   Renetta Washington PA-C

## 2018-10-31 ENCOUNTER — OFFICE VISIT (OUTPATIENT)
Dept: OPHTHALMOLOGY | Facility: CLINIC | Age: 68
End: 2018-10-31
Attending: OPHTHALMOLOGY
Payer: COMMERCIAL

## 2018-10-31 DIAGNOSIS — H40.1133 PRIMARY OPEN ANGLE GLAUCOMA OF BOTH EYES, SEVERE STAGE: Primary | ICD-10-CM

## 2018-10-31 PROCEDURE — G0463 HOSPITAL OUTPT CLINIC VISIT: HCPCS | Mod: ZF

## 2018-10-31 ASSESSMENT — VISUAL ACUITY
OS_CC: 20/60
CORRECTION_TYPE: GLASSES
OS_CC+: -2
OD_CC: 20/30
METHOD: SNELLEN - LINEAR
OD_CC+: -2

## 2018-10-31 ASSESSMENT — TONOMETRY
IOP_METHOD: TONOPEN
OS_IOP_MMHG: 20
OD_IOP_MMHG: 20

## 2018-10-31 ASSESSMENT — REFRACTION_WEARINGRX
OD_CYLINDER: +0.75
OD_ADD: +2.50
OS_SPHERE: -0.75
OS_CYLINDER: +0.75
OD_SPHERE: -1.00
OD_AXIS: 170
OS_AXIS: 025
OS_ADD: +2.50

## 2018-10-31 ASSESSMENT — CUP TO DISC RATIO
OS_RATIO: 0.8
OD_RATIO: 0.8

## 2018-10-31 ASSESSMENT — SLIT LAMP EXAM - LIDS
COMMENTS: NORMAL
COMMENTS: NORMAL

## 2018-10-31 NOTE — MR AVS SNAPSHOT
After Visit Summary   10/31/2018    Cristal Preciado    MRN: [id number]           Patient Information     Date Of Birth          1950        Visit Information        Provider Department      10/31/2018 9:45 AM Bette Delong MD [facility]        Today's Diagnoses     Primary open angle glaucoma of both eyes, severe stage [H40.11X3]    -  1       Follow-ups after your visit        Follow-up notes from your care team     Return in about 6 months (around 4/30/2019) for dilation, OCT rnfl.      Your next 10 appointments already scheduled     Jan 11, 2019  9:30 AM CST   (Arrive by 9:15 AM)   RETURN DIABETES with Tamela Herrera MD   [facility] ([facility])    [address]  [address] [facility] [address] [phone]            Jan 24, 2019  1:30 PM CST   (Arrive by 1:15 PM)   Return Visit with Vu Grant DPM   [facility] ([facility])    [address]  [address] [facility] [address] [phone]              Who to contact     Please call your clinic at [phone] to:    Ask questions about your health    Make or cancel appointments    Discuss your medicines    Learn about your test results    Speak to your doctor            Additional Information About Your Visit        [facility] ID     This is your [facility] ID. This could be used by other organizations to access your [facility] medical records  [id number]         Blood Pressure from Last 3 Encounters:   10/10/18 132/65   09/14/18 139/61   09/10/18 150/90    Weight from Last 3 Encounters:   10/10/18 109.3 kg (241 lb)   06/26/18 108.4 kg (239 lb)   07/20/16 106.5 kg (234 lb 12.8 oz)              Today, you had the following     No orders found for display         Today's Medication Changes          These changes are accurate as of 10/31/18 10:11 AM.  If you have any questions, ask your nurse or doctor.               These  medicines have changed or have updated prescriptions.        Dose/Directions    lisinopril 5 MG tablet   Commonly known as:  PRINIVIL/ZESTRIL   This may have changed:    - how much to take  - additional instructions   Used for:  Essential hypertension, benign        Dose:  10 mg   Take 2 tablets by mouth daily. Take one tab daily/Hold for SBP < 110   Quantity:  90 tablet   Refills:  3                Primary Care Provider Office Phone # Fax #    Meagan Valdez [phone]       [facility] [address] [place] [address]        Equal Access to Services     [facility]: Hadii aad ku hadasho Soomaali, waaxda luqadaha, qaybta kaalmada adeegyada, waxay idiin hayaan nadira amezcua . [facility] [phone].    ATENCIÓN: Si habla español, tiene a reyes disposición servicios gratuitos de asistencia lingüística. [facility] [phone].    We comply with applicable federal civil rights laws and [place] laws. We do not discriminate on the basis of race, color, national origin, age, disability, sex, sexual orientation, or gender identity.            Thank you!     Thank you for choosing [facility]  for your care. Our goal is always to provide you with excellent care. Hearing back from our patients is one way we can continue to improve our services. Please take a few minutes to complete the written survey that you may receive in the mail after your visit with us. Thank you!             Your Updated Medication List - Protect others around you: Learn how to safely use, store and throw away your medicines at www.disposemymeds.org.          This list is accurate as of 10/31/18 10:11 AM.  Always use your most recent med list.                   Brand Name Dispense Instructions for use Diagnosis    acetaminophen 500 MG tablet    TYLENOL     Take 1-2 tablets by mouth every 6 hours as needed. Tylenol Extra Strength.        allopurinol 100 MG tablet    ZYLOPRIM    180 tablet    Take 2 tablets by  mouth daily.    Hyperuricemia       AMLACTIN FOOT CREAM THERAPY EX      Externally apply 12 % topically        ASPIRIN PO      Take 81 mg by mouth daily        atorvastatin 80 MG tablet    LIPITOR    90 tablet    Take 1 tablet by mouth daily. Pt needs to have labs done prior to further refills.    Other and unspecified hyperlipidemia       CALCIUM CITRATE + D PO      600/400 mg/unit take 1 tabs daily twice daily.        carboxymethylcellulose 0.5 % Soln ophthalmic solution    REFRESH PLUS     1 drop 3 times daily as needed.        CHLORTHALIDONE PO      Take 25 mg by mouth daily        CLARITIN 10 MG tablet   Generic drug:  loratadine      Take 10 mg by mouth as needed.        clobetasol 0.05 % external solution    TEMOVATE    60 mL    Apply topically 2 times daily To itchy and scaly areas of scalp as needed.    Dermatitis, seborrheic       CYMBALTA PO      Take 60 mg by mouth daily        dorzolamide-timolol 2-0.5 % ophthalmic solution    COSOPT     Place 1 drop into both eyes 2 times daily        ferrous sulfate 325 (65 Fe) MG tablet    IRON     Take 325 mg by mouth daily (with breakfast)        folic acid 1 MG tablet    FOLVITE     Take 1 mg by mouth daily.        gabapentin 600 MG tablet    NEURONTIN    90 tablet    Take 600 mg in AM , 600 mg afternoon and 900 mg at HS.    Diabetic neuropathy (H)       HUMULIN R U-500 KWIKPEN 500 UNIT/ML PEN soln   Generic drug:  insulin reg HIGH CONC     20 mL    Inject 135 units at breakfast,125 units at lunch and 135 units at dinner.    Well controlled type 2 diabetes mellitus (H)       ibuprofen 400 MG tablet    ADVIL/MOTRIN     Take 400 mg by mouth every 6 hours as needed.        insulin pen needle 31G X 5 MM    B-D U/F    150 each    Use 5 time(s) per day.  Please dispense as BD Pen Needle Mini U/F 31G x 5 MM    Type 2 diabetes mellitus, controlled (H)       JOBST KNEE HIGH COMPRESSION SM Misc     2 each    JOBST 20-30MMHG COMPRESSION SM MISC  To both legs during waking  hours daily for leg swelling and venous stasis dermatitis. Do not sleep in stockings.    Venous stasis dermatitis of both lower extremities       * ketoconazole 2 % cream    NIZORAL    60 g    Apply topically 2 times daily To affected right ear mixed with triamcinolone ointment until return to clinic.    Dermatitis       * ketoconazole 2 % shampoo    NIZORAL    240 mL    To entire wet scalp and ears and then wash off after 5 minutes three times a week.    Dermatitis, seborrheic       LANTUS SC      Inject 100 Units Subcutaneous 2 times daily WILL STOP WHEN U500 IS INITIATED        liraglutide 18 MG/3ML Soln    VICTOZA    3 Month    Inject 1.8 mg Subcutaneous daily. Start with 0.6 mg x 5 days, then increase to 1.2 mg x 5 days, then 1.8 mg thereafter.  Do not advance to higher dose if you have nausea.    Diabetes mellitus, type 2 (H)       lisinopril 5 MG tablet    PRINIVIL/ZESTRIL    90 tablet    Take 2 tablets by mouth daily. Take one tab daily/Hold for SBP < 110    Essential hypertension, benign       LYRICA PO           metFORMIN 500 MG tablet    GLUCOPHAGE    360 tablet    Week 1: 1 tab in AM. Week 2: 1 tab in AM and 1 tab in PM. Week 3: 2 tab in AM and 1 tab in PM. Week 4 and After: 2 tab in AM and 2 tab in PM        metoprolol 10 mg/mL Susp    LOPRESSOR     Take 100 mg by mouth 2 times daily        mometasone 0.1 % cream    ELOCON    45 g    Apply sparingly to left medial ankle area daily.  Do not apply to face.        nitroGLYcerin 0.4 MG sublingual tablet    NITROSTAT     Place 0.4 mg under the tongue every 5 minutes as needed.        * NovoLOG VIAL 100 UNITS/ML injection   Generic drug:  insulin aspart      Will discontinue once starting U500        * insulin aspart 100 UNIT/ML injection    NovoLOG PEN     Inject 76-96 Units Subcutaneous HOLD WHEN TAKING U500        olopatadine HCl 0.2 % Soln    PATADAY    1 Bottle    Place 1 drop into both eyes daily as needed For itchy eyes    History of itching of eye        ranitidine 150 MG tablet    ZANTAC     Take 150 mg by mouth 2 times daily        sodium chloride 0.65 % nasal spray    OCEAN     Spray 1 spray in nostril daily as needed.        traMADol 50 MG tablet    ULTRAM          triamcinolone 0.1 % ointment    KENALOG    60 g    Apply topically daily To legs under compression stockings for stasis dermatitis discoloration.    Venous stasis dermatitis of both lower extremities       XALATAN 0.005 % ophthalmic solution   Generic drug:  latanoprost      1 drop At Bedtime. Left eye        * Notice:  This list has 4 medication(s) that are the same as other medications prescribed for you. Read the directions carefully, and ask your doctor or other care provider to review them with you.

## 2018-10-31 NOTE — NURSING NOTE
Chief Complaints and History of Present Illnesses   Patient presents with     Follow Up For     Primary open angle glaucoma (POAG) adv both eyes      HPI    Affected eye(s):  Both   Symptoms:     Tearing   Itching      Duration:  6 months   Frequency:  Constant       Do you have eye pain now?:  No      Comments:  Pt returns for Glaucoma follow up. Complains of a lot of itching and tearing in BE. Notes that she has been using new drop and also is taking Claritin which she feels is helpful. Last drop used this morning at 530 AM. NATIVIDAD BENAVIDEZ, COA 9:47 AM 10/31/2018

## 2018-11-02 ENCOUNTER — AMBULATORY - HEALTHEAST (OUTPATIENT)
Dept: GERIATRICS | Facility: CLINIC | Age: 68
End: 2018-11-02

## 2018-11-02 ENCOUNTER — COMMUNICATION - HEALTHEAST (OUTPATIENT)
Dept: GERIATRICS | Facility: CLINIC | Age: 68
End: 2018-11-02

## 2018-11-02 DIAGNOSIS — E11.9 DIABETES MELLITUS, TYPE II (H): ICD-10-CM

## 2018-11-08 ENCOUNTER — TELEPHONE (OUTPATIENT)
Dept: ENDOCRINOLOGY | Facility: CLINIC | Age: 68
End: 2018-11-08

## 2018-11-08 DIAGNOSIS — E11.9 WELL CONTROLLED TYPE 2 DIABETES MELLITUS (H): ICD-10-CM

## 2018-11-08 NOTE — TELEPHONE ENCOUNTER
[facility] calls with low supper BS of 63 for Cristal. This morning she was 114, lunch  140 . She was increased to Metformin 1000 mg BID last week  And is now running into lows with needing to eat a high CHO HS snack.   I called Dr Herrera and gave her the information. [facility] was notified to change the U 500 to 100 units  at breakfast, 80 units Lunch and 100 units dinner. The plan is to decrease the  U 500 as the Metformin becomes effective which it is. I gave them the after hours number and also instructed them to call daily for changes on U 500 if needed so we can prevent the lows and cut back on her eating high CHO snacks to make up for  Lows. Medication list was updated . .

## 2018-11-09 ENCOUNTER — TELEPHONE (OUTPATIENT)
Dept: ENDOCRINOLOGY | Facility: CLINIC | Age: 68
End: 2018-11-09

## 2018-11-09 ENCOUNTER — COMMUNICATION - HEALTHEAST (OUTPATIENT)
Dept: GERIATRICS | Facility: CLINIC | Age: 68
End: 2018-11-09

## 2018-11-10 ENCOUNTER — TELEPHONE (OUTPATIENT)
Dept: ENDOCRINOLOGY | Facility: CLINIC | Age: 68
End: 2018-11-10

## 2018-11-10 NOTE — TELEPHONE ENCOUNTER
[facility] called to review glucose reading:  Yesterday U500 decreased from 100/80/100 to 80/60/80 after hypoglycemia 67    11/9/2018 104 140 67 145 (hs) 146  11/10/2018 208 244    Plan  - Increase U500 to 90/70/90 with meal and continue metformin.  - Check glucose at bedtime and 1-2 am. Contact me if glucose <70 or >350.     They will also call to review glucose tomorrow before lunch.    Baron Ro MD  Endocrine Fellow  [phone]

## 2018-11-10 NOTE — TELEPHONE ENCOUNTER
Got call regarding low glucose.    Yesterday, pt had low glucose of 63, so staff called endocrine and U500 dose was decreased from 120/110/120 units with meals to 100/80/100 units with meal. Her lower insulin requirement was thought to be from increased dose of metformin last week (now in 1000 mg BID)    Yesterday glucose were: 63, 67, 82 at supper, 177 hs and 125 at 1 am  Today: 104 AM, 140 before lunch, 91 at 3.30 and 67 before dinner.    Pt has been eating normally. This afternoon, she felt tired, so they check glucose @3.30 and it was 91. Prior to supper her number was down to 67, so they called.    Pt has no problem eating and almost finish full meal with pizza.    I recommend decreased U500 further (20%) to   80/60/80 units with meal and continue metformin.  Check glucose at bedtime and 1-2 am. Contact me if glucose <70 or >350.    They will also call to review glucose tomorrow before lunch.    Baron Ro MD  Endocrine Fellow  [phone]

## 2018-11-11 ENCOUNTER — TELEPHONE (OUTPATIENT)
Dept: ENDOCRINOLOGY | Facility: CLINIC | Age: 68
End: 2018-11-11

## 2018-11-11 NOTE — TELEPHONE ENCOUNTER
[facility] called to review glucose reading:    U500 120/110/120  11/8/2018 Glucose 63/ 67/  82/ 177/ 125  U500 decreased to 100/80/100  11/9/2018                     104/ 140/ 67/ 145/ 146 U500 decreased to 80/60/80  11/10/2018                  208/ 244/ 137/ 182/ 161 U500 increased to 90/70/90  11/11/2018              255/ 254    Since metformin dose increased, patient has been getting extra/ more bedtime snacks since they are concerned about hypoglycemia. I explained that she should get snacks if she was hungry and no need to increase snack to prevent low sugar. The goal of increased dose metformin, is that we will be able to decrease insulin dose.     Plan  - No extra snacks at night if there patient is not hungry  - Continue U500 at 90/70/90 with meal and continue metformin.  - Check glucose at bedtime and 1-2 am. Contact us if glucose <70 or >350.  - I will contact her primary endocrinologist (Renetta Washington and Dr. Herrera) regarding this change. They would expect a call if there're further recs.       Baron Ro MD  Endocrine Fellow  [phone]

## 2018-11-14 ENCOUNTER — TELEPHONE (OUTPATIENT)
Dept: ENDOCRINOLOGY | Facility: CLINIC | Age: 68
End: 2018-11-14

## 2018-11-19 ENCOUNTER — TELEPHONE (OUTPATIENT)
Dept: ENDOCRINOLOGY | Facility: CLINIC | Age: 68
End: 2018-11-19

## 2018-11-19 NOTE — TELEPHONE ENCOUNTER
I called Cristal's nurse, Rosalva ([phone]), to review glucose records.  She has been having low readings in the 60's and 70s at 1am for the past week.  Cristal is now up to 2000 mg per day of Metformin.  She is having loose stool since increasing from 1500 mg/day.  She is currently on U500 90B/70L/90D and Victoza 1.8 mg daily.  Staff are giving her HS snack to prevent hypoglycemia.      A/P:  Cristal is much more insulin sensitive with the increased dose of Metformin.  Sugars are running low in the night despite HS snack.  Since she is having diarrhea, will reduce Metformin to 1500 mg/day.  Will also reduce U-500 dose as follows:   70B/60L/70D.  Asked them to send glucose records in 2 days for further adjustment.  Gave endo on call phone number for after hours concerns.  We briefly discussed possibility of switching back to U-100 Lantus or Tresiba as her insulin requirements are much lower on the Metformin.  Will remain on this regimen for now to see if she tolerates the Metformin at a lower dose.      Renetta Washington PA-C

## 2018-11-26 ENCOUNTER — TELEPHONE (OUTPATIENT)
Dept: ENDOCRINOLOGY | Facility: CLINIC | Age: 68
End: 2018-11-26

## 2018-11-26 DIAGNOSIS — E11.9 WELL CONTROLLED TYPE 2 DIABETES MELLITUS (H): ICD-10-CM

## 2018-11-26 RX ORDER — METFORMIN HCL 500 MG
1000 TABLET, EXTENDED RELEASE 24 HR ORAL
Qty: 180 TABLET | Refills: 3 | Status: ON HOLD | OUTPATIENT
Start: 2018-11-26 | End: 2023-01-01

## 2018-11-27 NOTE — TELEPHONE ENCOUNTER
I called to speak with Cristal's nurse after receiving a report that she continues having off and on GI side effects from the Metformin.  She is currently taking 500 mg in the Am and 1000 mg in the evening.  She is taking U500 70B/60L/70D.  Sugars have been well controlled with readings between  mg/dL.  Most readings are in the mid-100's.    Will reduce Metformin to 1000 mg daily and switch to Extended Release to minimize GI side effects.  Because of some continued hypoglycemia, will reduce U500 insulin to 60 units TID.  They will send glucose records again in 1 week, sooner with continued side effects.     Renetta Washington PA-C

## 2018-11-29 ENCOUNTER — OFFICE VISIT - HEALTHEAST (OUTPATIENT)
Dept: GERIATRICS | Facility: CLINIC | Age: 68
End: 2018-11-29

## 2018-11-29 DIAGNOSIS — I10 ESSENTIAL HYPERTENSION: ICD-10-CM

## 2018-11-29 DIAGNOSIS — G62.9 NEUROPATHY: ICD-10-CM

## 2018-11-29 DIAGNOSIS — Z86.73 HISTORY OF STROKE: ICD-10-CM

## 2018-12-07 ENCOUNTER — AMBULATORY - HEALTHEAST (OUTPATIENT)
Dept: ADMINISTRATIVE | Facility: CLINIC | Age: 68
End: 2018-12-07

## 2018-12-11 ENCOUNTER — COMMUNICATION - HEALTHEAST (OUTPATIENT)
Dept: CARE COORDINATION | Facility: HOSPITAL | Age: 68
End: 2018-12-11

## 2019-01-08 ENCOUNTER — PATIENT OUTREACH (OUTPATIENT)
Dept: CARE COORDINATION | Facility: CLINIC | Age: 69
End: 2019-01-08

## 2019-01-09 ENCOUNTER — OFFICE VISIT - HEALTHEAST (OUTPATIENT)
Dept: GERIATRICS | Facility: CLINIC | Age: 69
End: 2019-01-09

## 2019-01-09 DIAGNOSIS — G89.29 OTHER CHRONIC PAIN: ICD-10-CM

## 2019-01-09 DIAGNOSIS — I10 ESSENTIAL HYPERTENSION: ICD-10-CM

## 2019-01-09 DIAGNOSIS — G62.9 NEUROPATHY: ICD-10-CM

## 2019-01-10 ENCOUNTER — RECORDS - HEALTHEAST (OUTPATIENT)
Dept: LAB | Facility: CLINIC | Age: 69
End: 2019-01-10

## 2019-01-10 LAB
ANION GAP SERPL CALCULATED.3IONS-SCNC: 9 MMOL/L (ref 5–18)
BUN SERPL-MCNC: 32 MG/DL (ref 8–22)
CALCIUM SERPL-MCNC: 9.6 MG/DL (ref 8.5–10.5)
CHLORIDE BLD-SCNC: 108 MMOL/L (ref 98–107)
CO2 SERPL-SCNC: 21 MMOL/L (ref 22–31)
CREAT SERPL-MCNC: 1.46 MG/DL (ref 0.6–1.1)
GFR SERPL CREATININE-BSD FRML MDRD: 36 ML/MIN/1.73M2
GLUCOSE BLD-MCNC: 136 MG/DL (ref 70–125)
HBA1C MFR BLD: 7 % (ref 4.2–6.1)
POTASSIUM BLD-SCNC: 4.6 MMOL/L (ref 3.5–5)
SODIUM SERPL-SCNC: 138 MMOL/L (ref 136–145)

## 2019-01-15 ENCOUNTER — MEDICAL CORRESPONDENCE (OUTPATIENT)
Dept: HEALTH INFORMATION MANAGEMENT | Facility: CLINIC | Age: 69
End: 2019-01-15

## 2019-01-18 ENCOUNTER — OFFICE VISIT (OUTPATIENT)
Dept: ENDOCRINOLOGY | Facility: CLINIC | Age: 69
End: 2019-01-18
Payer: COMMERCIAL

## 2019-01-18 VITALS — SYSTOLIC BLOOD PRESSURE: 140 MMHG | HEART RATE: 73 BPM | DIASTOLIC BLOOD PRESSURE: 80 MMHG

## 2019-01-18 DIAGNOSIS — E11.9 WELL CONTROLLED TYPE 2 DIABETES MELLITUS (H): Primary | ICD-10-CM

## 2019-01-18 ASSESSMENT — PAIN SCALES - GENERAL: PAINLEVEL: NO PAIN (0)

## 2019-01-18 NOTE — LETTER
1/18/2019       RE: Cristal Preciado  [facility]  [address]  [place] [address]     Dear Colleague,    Thank you for referring your patient, Cristal Preciado, to the [facility] ENDOCRINOLOGY at [facility] Please see a copy of my visit note below.    Endocrinology Clinic Visit 01/18/19  NAME:  Cristal Preciado  PCP:  Meagan Valdez  MRN:  [id number]    Chief Complaint     Type II DM follow-up 1 month    History of Present Illness     Cristal Preciado is a 68 year old female who is seen in clinic for diabetes management.     She has had type 2 diabetes since 1984. She has complications with neuropathy, microalbuminuria, CAD status post stent 2009, CVA.     June 2018 she was switched by Renetta Washington from basal/prandial insulin shots to TID U-500.    Last visit wit me October 2018, I added Metformin. She was subsequently switched to ER due to GI upset.   Since then, she had some hypoglycemia and we reduced her U-500 dosages.   She has been on Victoza all along.     A1c 1/10/19 is 7.0 (was at 7.1 three months ago).     Diabetes Care:   Eyes: last eye exam April 2018. No DR. +ve cataract, glaucoma  Kidneys: Urine Microalbumin checked at [facility] was 726.3 on 9/12/2018. Normal Cr.  Nerves: has peripheral neuropathy managed with gabapentin, Lyrica and Cymbalta. Sees podiatry on regular basis.   Smoking: none  Blood Pressure: HTN on Ace inhibitor  Lipids: dyslipidemia on statin  Macrovascular: h/o stroke. CAD s/p stents, PVD    Associated Signs/Symptoms  Hypoglycemia: feels lows at <70  Hyperglycemia: increased thirst.  Neuropathy: + numbness or in extremities  Vascular Symtpoms: no claudication or edema  Angina/CHF: not chest pain, no LUCERO, no orthopnea  Ulcers: No  Amputations: No    Current treatment strategy: U-500: 60B/60L/60D, Victoza 1.8 mg s/c daily, Metformin XR 1000 mg po daily, Victoza 1.8 mg s/c daily    Blood Glucose  Monitoring: BG log reviewed from NH.   AM: 180-220  NOON: 150-270  DINNER:  100-150 MOSTLY  BED:     Hypoglycemia: none    Diet: NH diet, 3 meals, + bedtime snack    Exercise: Limited due to history of CVA, she exercises with a nursing home program, she has many activities there including crafting, baking, light bowling.    Weight:   Wt Readings from Last 4 Encounters:   10/10/18 109.3 kg (241 lb)   06/26/18 108.4 kg (239 lb)   07/20/16 106.5 kg (234 lb 12.8 oz)   03/02/15 105.7 kg (233 lb)       Problem List     Patient Active Problem List   Diagnosis     Cataract     CAD (coronary artery disease)     Diabetes mellitus, type 2 (H)     Diabetic nephropathy (H)     Diabetic neuropathy (H)     Diabetic retinopathy (H)     GERD (gastroesophageal reflux disease)     Glaucoma     Hypertension     Hyperlipidemia     CVA (cerebral vascular accident) (H)     Morbid obesity (H)     Anemia     Seasonal allergies     Depression     Tubular adenoma of colon     Leg weakness     Dermatitis, seborrheic     Eczematous dermatitis     History of coronary artery disease     Venous stasis dermatitis of both lower extremities     Uncontrolled type 2 diabetes mellitus (H)     Chronic dermatitis of hands     Neoplasm of uncertain behavior of skin        Medications     Current Outpatient Medications   Medication     acetaminophen (TYLENOL) 500 MG tablet     allopurinol (ZYLOPRIM) 100 MG tablet     ASPIRIN PO     atorvastatin (LIPITOR) 80 MG tablet     Calcium Citrate-Vitamin D (CALCIUM CITRATE + D PO)     carboxymethylcellulose (REFRESH PLUS) 0.5 % SOLN     CHLORTHALIDONE PO     clobetasol (TEMOVATE) 0.05 % external solution     dorzolamide-timolol (COSOPT) 2-0.5 % ophthalmic solution     DULoxetine HCl (CYMBALTA PO)     Elastic Bandages & Supports (JOBST KNEE HIGH COMPRESSION SM) MISC     ferrous sulfate (IRON) 325 (65 FE) MG tablet     folic acid (FOLVITE) 1 MG tablet     gabapentin (NEURONTIN) 600 MG tablet     ibuprofen  (ADVIL,MOTRIN) 400 MG tablet     insulin aspart (NOVOLOG PEN) 100 UNIT/ML injection     insulin aspart (NOVOLOG VIAL) 100 UNITS/ML injection     insulin pen needle (B-D U/F) 31G X 5 MM     insulin reg HIGH CONC (HUMULIN R U-500 KWIKPEN) 500 UNIT/ML PEN soln     ketoconazole (NIZORAL) 2 % cream     ketoconazole (NIZORAL) 2 % shampoo     latanoprost (XALATAN) 0.005 % ophthalmic solution     liraglutide (VICTOZA) 18 MG/3ML SOLN     lisinopril (PRINIVIL,ZESTRIL) 5 MG tablet     loratadine (CLARITIN) 10 MG tablet     metFORMIN (GLUCOPHAGE-XR) 500 MG 24 hr tablet     metoprolol (LOPRESSOR) 10 mg/mL SUSP     mometasone (ELOCON) 0.1 % cream     nitroGLYCERIN (NITROSTAT) 0.4 MG SL tablet     olopatadine HCl (PATADAY) 0.2 % SOLN     Podiatric Products (AMLACTIN FOOT CREAM THERAPY EX)     Pregabalin (LYRICA PO)     ranitidine (ZANTAC) 150 MG tablet     sodium chloride (SODIUM CHLORIDE) 0.65 % nasal spray     traMADol (ULTRAM) 50 MG tablet     triamcinolone (KENALOG) 0.1 % ointment     No current facility-administered medications for this visit.         Allergies     Allergies   Allergen Reactions     Dust Mites Other (See Comments)     Sneezing runny eyes and nose.     Food Allergy Formula Hives     Mountain Dew and Walnuts     Pollen Extract Other (See Comments)     Sneezing runny eyes and nose.       Medical / Surgical History     Past Medical History:   Diagnosis Date     AION (anterior ischaemic optic neuropathy), left eye     NAION LE     CAD (coronary artery disease) 3/2009    [facility]; Angio 2013 UM- normal coronary arteries     Cataract      CVA (cerebral vascular accident) (H)     admitted at [facility]     on Cymbalta     Diabetes mellitus, type 2 (H)      Diabetic nephropathy (H)      Diabetic neuropathy (H)     severe     Diabetic retinopathy (H)      GERD (gastroesophageal reflux disease)      Hyperlipidemia      Hypertension     ECHO 2013, TDS, NL EF     POAG (primary open-angle glaucoma)      adv BE     Seasonal allergies      Tubular adenoma of colon     repeat colonoscopy in      Past Surgical History:   Procedure Laterality Date     COLONOSCOPY  7/15/2013    Tubular adenoma; repeat in 2018;Procedure: COMBINED COLONOSCOPY, SINGLE BIOPSY/POLYPECTOMY BY BIOPSY;;  Surgeon: Don King MD;  Tubular adenoma     EXTRACAPSULAR CATARACT EXTRATION WITH INTRAOCULAR LENS IMPLANT  11-10-09, 2-9-10    11-10-09 Lt, 2-9-10 Rt; Left eye 2012       Social History     Social History     Socioeconomic History     Marital status: Single     Spouse name: Not on file     Number of children: Not on file     Years of education: Not on file     Highest education level: Not on file   Social Needs     Financial resource strain: Not on file     Food insecurity - worry: Not on file     Food insecurity - inability: Not on file     Transportation needs - medical: Not on file     Transportation needs - non-medical: Not on file   Occupational History     Not on file   Tobacco Use     Smoking status: Former Smoker     Last attempt to quit: 3/6/2009     Years since quittin.8     Smokeless tobacco: Never Used   Substance and Sexual Activity     Alcohol use: No     Drug use: No     Sexual activity: No   Other Topics Concern     Parent/sibling w/ CABG, MI or angioplasty before 65F 55M? Not Asked   Social History Narrative    Pt has three daughters and two sons, single.  Her daughter Court, see's her often at the Nursing Home ([facility]  Moved into Nursing Home in 2011 after a hospitalization for ALY.  Moved from [place] in  to [facility]". Has 5 grandchildren.       Family History     Family History   Problem Relation Age of Onset     Hypertension Mother      Cerebrovascular Disease Mother      Glaucoma Father      Cancer Father      Diabetes Sister      Glaucoma Sister      Cancer - colorectal Other      Cerebrovascular Disease Other      Skin Cancer No family hx of      Melanoma No family  hx of        ROS     Constitutional: no fevers, chills, night sweats. No weight loss or fatigue. Good appetite  Eyes: no vision changes, no eye redness, no diplopia  Ears, Nose, mouth, throat: no hearing changes, no tinnitus, no rhinorrhea, no nasal congestion  Cardiovascular: no chest pain, no orthopnea or PND, no edema, no palpitations  Respiratory: no dyspnea, no cough, no sputum, no wheezing  Gastrointestinal: no nausea, no vomiting, no abdominal pain, no diarrhea, no constipation  Genitourinary: no dysuria, no frequency, no urgency, no nocturia  Musculoskeletal: no joint pains, no back pain, no cramps, no fractures  Skin: no rash, no itching, no dryness, no ulcers, no hair loss  Neurological: no headache, no weakness, no numbness, no dizziness, no tremors  Psychiatric: no anxiety, no sadness  Hematologic/lymphatic: no easy bruising, no bleeding, no palor    Physical Exam   /80   Pulse 73      General: Comfortable, no obvious distress, normal body habitus  Eyes: Sclera anicteric, moist conjunctiva  HENT: Atraumatic, oropharynx clear, moist mucous membranes with no mucosal ulcerations  Neck: Trachea midline, supple. Thyroid: Thyroid is normal in size and texture  CV: Regular rhythm, normal rate. No murmurs auscultated  Resp: Clear to auscultation bilaterally, good effort  Abdomen:  Soft, non tender, non distended. Bowel sounds heard. No organomegaly. No palpable abdominal scar  Skin: No rashes, lesions, or subcutaneous nodules.   Psych: Alert and oriented x 3. Appropriate affect, good insight  Extremities: No peripheral edema  Musculoskeletal: Appropriate muscle bulk and strength  Lymphatic: No cervical lymphadenopathy  Neuro: Moves all four extremities. No focal deficits on limited exam. Gait normal.       Labs/Imaging and Outside Records     Pertinent Labs were reviewed and updated in EPIC and reviewed.    Summary of recent findings:   Lab Results   Component Value Date    A1C 7.5 10/24/2016    A1C 7.5  10/12/2015    A1C 8.1 03/06/2014    A1C 7.2 03/07/2013    A1C 7.7 02/13/2013       TSH   Date Value Ref Range Status   10/24/2016 1.20 0.40 - 4.00 mU/L Final   10/12/2015 1.18 0.40 - 4.00 mU/L Final   08/21/2014 1.24 0.40 - 4.00 mU/L Final     Comment:     Effective 7/30/2014, the reference range for this assay has changed to reflect   new instrumentation/methodology.     08/05/2013 1.12 0.4 - 5.0 mU/L Final   07/15/2010 0.53 0.4 - 5.0 mU/L Final     T4 Total   Date Value Ref Range Status   10/30/2008 10.6 5.0 - 11.0 ug/dL Final     T4 Free   Date Value Ref Range Status   04/21/2006 1.04 0.70 - 1.85 ng/dL Final   09/23/2005 1.58 0.70 - 1.85 ng/dL Final       Creatinine   Date Value Ref Range Status   03/19/2018 1.10 0.5 - 1.10 mg/dL Final       Recent Labs   Lab Test 03/19/18  10/24/16   1301  10/12/15   1626  08/21/14   0935   05/10/12   1022   CHOL  109   --    --   110   --   144   HDL   --    --    --   43*   --   46*   LDL   --   48  51  43   < >  73   TRIG   --    --    --   122   --   123   CHOLHDLRATIO   --    --    --   2.6   --   3.1    < > = values in this interval not displayed.     I personally reviewed the patient's outside records from [facility] and faxed records. Summary of pertinent findings in HPI.    Impression / Plan     1. Diabetes Mellitus: Type 2  Current glycemic control can be considered good.   There is a trend for hyperglycemia in AM (fasting). Her bedtime snack may been to be covered with insulin. But since we are using U-500, I worry about stacking, leading to overnight hypoglycemia.   Since her HbA1c is good, I will just hold off on any changes.     Continue U-500: 60 units TID with meals, metformin XR 1000 mg daily and Victoza 1.8 mg daily.    If next A1c is higher, consider covering the bedtime snack with a little insulin dose.     2. Diabetes Complications: With nephropathy, peripheral neuropathy, cardiovascular disease and cerebrovascular disease.     3. Blood Pressure  Management: Blood pressure is controlled. Currently is on pharmacotherapy for this.     4.Lipid Management: Per the new ACC/MIKE/NHLBI guidelines, statins are recommended for individuals with diabetes aged 40-75 with LDL  without ASCVD, and for any individual with ASCVD. Currently the patient is on a statin.     5. Smoking Status: Patient Pt is smoke free..       Follow up: in 3 months with Renetta, annual with me    Tamela Herrera MD

## 2019-01-18 NOTE — PROGRESS NOTES
Endocrinology Clinic Visit 01/18/19  NAME:  Cristal Preciado  PCP:  Meagan Valdez  MRN:  [id number]    Chief Complaint     Type II DM follow-up 1 month    History of Present Illness     Cristal Preciado is a 68 year old female who is seen in clinic for diabetes management.     She has had type 2 diabetes since 1984. She has complications with neuropathy, microalbuminuria, CAD status post stent 2009, CVA.     June 2018 she was switched by Renetta Washington from basal/prandial insulin shots to TID U-500.    Last visit wit me October 2018, I added Metformin. She was subsequently switched to ER due to GI upset.   Since then, she had some hypoglycemia and we reduced her U-500 dosages.   She has been on Victoza all along.     A1c 1/10/19 is 7.0 (was at 7.1 three months ago).     Diabetes Care:   Eyes: last eye exam April 2018. No DR. +ve cataract, glaucoma  Kidneys: Urine Microalbumin [facility] was 726.3 on 9/12/2018. Normal Cr.  Nerves: has peripheral neuropathy managed with gabapentin, Lyrica and Cymbalta. Sees podiatry on regular basis.   Smoking: none  Blood Pressure: HTN on Ace inhibitor  Lipids: dyslipidemia on statin  Macrovascular: h/o stroke. CAD s/p stents, PVD    Associated Signs/Symptoms  Hypoglycemia: feels lows at <70  Hyperglycemia: increased thirst.  Neuropathy: + numbness or in extremities  Vascular Symtpoms: no claudication or edema  Angina/CHF: not chest pain, no LUCERO, no orthopnea  Ulcers: No  Amputations: No    Current treatment strategy: U-500: 60B/60L/60D, Victoza 1.8 mg s/c daily, Metformin XR 1000 mg po daily, Victoza 1.8 mg s/c daily    Blood Glucose Monitoring: BG log reviewed from NH.   AM: 180-220  NOON: 150-270  DINNER:  100-150 MOSTLY  BED:     Hypoglycemia: none    Diet: NH diet, 3 meals, + bedtime snack    Exercise: Limited due to history of CVA, she exercises with a nursing home program, she has many activities there including crafting, baking, light  bowling.    Weight:   Wt Readings from Last 4 Encounters:   10/10/18 109.3 kg (241 lb)   06/26/18 108.4 kg (239 lb)   07/20/16 106.5 kg (234 lb 12.8 oz)   03/02/15 105.7 kg (233 lb)       Problem List     Patient Active Problem List   Diagnosis     Cataract     CAD (coronary artery disease)     Diabetes mellitus, type 2 (H)     Diabetic nephropathy (H)     Diabetic neuropathy (H)     Diabetic retinopathy (H)     GERD (gastroesophageal reflux disease)     Glaucoma     Hypertension     Hyperlipidemia     CVA (cerebral vascular accident) (H)     Morbid obesity (H)     Anemia     Seasonal allergies     Depression     Tubular adenoma of colon     Leg weakness     Dermatitis, seborrheic     Eczematous dermatitis     History of coronary artery disease     Venous stasis dermatitis of both lower extremities     Uncontrolled type 2 diabetes mellitus (H)     Chronic dermatitis of hands     Neoplasm of uncertain behavior of skin        Medications     Current Outpatient Medications   Medication     acetaminophen (TYLENOL) 500 MG tablet     allopurinol (ZYLOPRIM) 100 MG tablet     ASPIRIN PO     atorvastatin (LIPITOR) 80 MG tablet     Calcium Citrate-Vitamin D (CALCIUM CITRATE + D PO)     carboxymethylcellulose (REFRESH PLUS) 0.5 % SOLN     CHLORTHALIDONE PO     clobetasol (TEMOVATE) 0.05 % external solution     dorzolamide-timolol (COSOPT) 2-0.5 % ophthalmic solution     DULoxetine HCl (CYMBALTA PO)     Elastic Bandages & Supports (JOBST KNEE HIGH COMPRESSION SM) MISC     ferrous sulfate (IRON) 325 (65 FE) MG tablet     folic acid (FOLVITE) 1 MG tablet     gabapentin (NEURONTIN) 600 MG tablet     ibuprofen (ADVIL,MOTRIN) 400 MG tablet     insulin aspart (NOVOLOG PEN) 100 UNIT/ML injection     insulin aspart (NOVOLOG VIAL) 100 UNITS/ML injection     insulin pen needle (B-D U/F) 31G X 5 MM     insulin reg HIGH CONC (HUMULIN R U-500 KWIKPEN) 500 UNIT/ML PEN soln     ketoconazole (NIZORAL) 2 % cream     ketoconazole (NIZORAL) 2 %  shampoo     latanoprost (XALATAN) 0.005 % ophthalmic solution     liraglutide (VICTOZA) 18 MG/3ML SOLN     lisinopril (PRINIVIL,ZESTRIL) 5 MG tablet     loratadine (CLARITIN) 10 MG tablet     metFORMIN (GLUCOPHAGE-XR) 500 MG 24 hr tablet     metoprolol (LOPRESSOR) 10 mg/mL SUSP     mometasone (ELOCON) 0.1 % cream     nitroGLYCERIN (NITROSTAT) 0.4 MG SL tablet     olopatadine HCl (PATADAY) 0.2 % SOLN     Podiatric Products (AMLACTIN FOOT CREAM THERAPY EX)     Pregabalin (LYRICA PO)     ranitidine (ZANTAC) 150 MG tablet     sodium chloride (SODIUM CHLORIDE) 0.65 % nasal spray     traMADol (ULTRAM) 50 MG tablet     triamcinolone (KENALOG) 0.1 % ointment     No current facility-administered medications for this visit.         Allergies     Allergies   Allergen Reactions     Dust Mites Other (See Comments)     Sneezing runny eyes and nose.     Food Allergy Formula Hives     Mountain Dew and Walnuts     Pollen Extract Other (See Comments)     Sneezing runny eyes and nose.       Medical / Surgical History     Past Medical History:   Diagnosis Date     AION (anterior ischaemic optic neuropathy), left eye     NAION LE     CAD (coronary artery disease) 3/2009    [facility]; Angio 2013 [facility]- normal coronary arteries     Cataract      CVA (cerebral vascular accident) (H)     admitted at [facility]     on Cymbalta     Diabetes mellitus, type 2 (H)      Diabetic nephropathy (H)      Diabetic neuropathy (H)     severe     Diabetic retinopathy (H)      GERD (gastroesophageal reflux disease)      Hyperlipidemia      Hypertension     ECHO 2013, TDS, NL EF     POAG (primary open-angle glaucoma)     adv BE     Seasonal allergies      Tubular adenoma of colon 2013    repeat colonoscopy in 2018     Past Surgical History:   Procedure Laterality Date     COLONOSCOPY  7/15/2013    Tubular adenoma; repeat in 2018;Procedure: COMBINED COLONOSCOPY, SINGLE BIOPSY/POLYPECTOMY BY BIOPSY;;  Surgeon: Don King MD;   Tubular adenoma     EXTRACAPSULAR CATARACT EXTRATION WITH INTRAOCULAR LENS IMPLANT  11-10-09, 2-9-10    11-10-09 Lt, 2-9-10 Rt; Left eye 2012       Social History     Social History     Socioeconomic History     Marital status: Single     Spouse name: Not on file     Number of children: Not on file     Years of education: Not on file     Highest education level: Not on file   Social Needs     Financial resource strain: Not on file     Food insecurity - worry: Not on file     Food insecurity - inability: Not on file     Transportation needs - medical: Not on file     Transportation needs - non-medical: Not on file   Occupational History     Not on file   Tobacco Use     Smoking status: Former Smoker     Last attempt to quit: 3/6/2009     Years since quittin.8     Smokeless tobacco: Never Used   Substance and Sexual Activity     Alcohol use: No     Drug use: No     Sexual activity: No   Other Topics Concern     Parent/sibling w/ CABG, MI or angioplasty before 65F 55M? Not Asked   Social History Narrative    Pt has three daughters and two sons, single.  Her daughter Court, see's her often at the Nursing Home ([facility]).  Moved into Nursing Home in 2011 after a hospitalization for ALY.  Moved from [place] in  to [facility]. Has 5 grandchildren.       Family History     Family History   Problem Relation Age of Onset     Hypertension Mother      Cerebrovascular Disease Mother      Glaucoma Father      Cancer Father      Diabetes Sister      Glaucoma Sister      Cancer - colorectal Other      Cerebrovascular Disease Other      Skin Cancer No family hx of      Melanoma No family hx of        ROS     Constitutional: no fevers, chills, night sweats. No weight loss or fatigue. Good appetite  Eyes: no vision changes, no eye redness, no diplopia  Ears, Nose, mouth, throat: no hearing changes, no tinnitus, no rhinorrhea, no nasal congestion  Cardiovascular: no chest pain, no orthopnea or PND, no  edema, no palpitations  Respiratory: no dyspnea, no cough, no sputum, no wheezing  Gastrointestinal: no nausea, no vomiting, no abdominal pain, no diarrhea, no constipation  Genitourinary: no dysuria, no frequency, no urgency, no nocturia  Musculoskeletal: no joint pains, no back pain, no cramps, no fractures  Skin: no rash, no itching, no dryness, no ulcers, no hair loss  Neurological: no headache, no weakness, no numbness, no dizziness, no tremors  Psychiatric: no anxiety, no sadness  Hematologic/lymphatic: no easy bruising, no bleeding, no palor    Physical Exam   /80   Pulse 73      General: Comfortable, no obvious distress, normal body habitus  Eyes: Sclera anicteric, moist conjunctiva  HENT: Atraumatic, oropharynx clear, moist mucous membranes with no mucosal ulcerations  Neck: Trachea midline, supple. Thyroid: Thyroid is normal in size and texture  CV: Regular rhythm, normal rate. No murmurs auscultated  Resp: Clear to auscultation bilaterally, good effort  Abdomen:  Soft, non tender, non distended. Bowel sounds heard. No organomegaly. No palpable abdominal scar  Skin: No rashes, lesions, or subcutaneous nodules.   Psych: Alert and oriented x 3. Appropriate affect, good insight  Extremities: No peripheral edema  Musculoskeletal: Appropriate muscle bulk and strength  Lymphatic: No cervical lymphadenopathy  Neuro: Moves all four extremities. No focal deficits on limited exam. Gait normal.       Labs/Imaging and Outside Records     Pertinent Labs were reviewed and updated in EPIC and reviewed.    Summary of recent findings:   Lab Results   Component Value Date    A1C 7.5 10/24/2016    A1C 7.5 10/12/2015    A1C 8.1 03/06/2014    A1C 7.2 03/07/2013    A1C 7.7 02/13/2013       TSH   Date Value Ref Range Status   10/24/2016 1.20 0.40 - 4.00 mU/L Final   10/12/2015 1.18 0.40 - 4.00 mU/L Final   08/21/2014 1.24 0.40 - 4.00 mU/L Final     Comment:     Effective 7/30/2014, the reference range for this assay has  changed to reflect   new instrumentation/methodology.     08/05/2013 1.12 0.4 - 5.0 mU/L Final   07/15/2010 0.53 0.4 - 5.0 mU/L Final     T4 Total   Date Value Ref Range Status   10/30/2008 10.6 5.0 - 11.0 ug/dL Final     T4 Free   Date Value Ref Range Status   04/21/2006 1.04 0.70 - 1.85 ng/dL Final   09/23/2005 1.58 0.70 - 1.85 ng/dL Final       Creatinine   Date Value Ref Range Status   03/19/2018 1.10 0.5 - 1.10 mg/dL Final       Recent Labs   Lab Test 03/19/18  10/24/16   1301  10/12/15   1626  08/21/14   0935   05/10/12   1022   CHOL  109   --    --   110   --   144   HDL   --    --    --   43*   --   46*   LDL   --   48  51  43   < >  73   TRIG   --    --    --   122   --   123   CHOLHDLRATIO   --    --    --   2.6   --   3.1    < > = values in this interval not displayed.     I personally reviewed the patient's outside records from [facility] and faxed records. Summary of pertinent findings in HPI.    Impression / Plan     1. Diabetes Mellitus: Type 2  Current glycemic control can be considered good.   There is a trend for hyperglycemia in AM (fasting). Her bedtime snack may been to be covered with insulin. But since we are using U-500, I worry about stacking, leading to overnight hypoglycemia.   Since her HbA1c is good, I will just hold off on any changes.     Continue U-500: 60 units TID with meals, metformin XR 1000 mg daily and Victoza 1.8 mg daily.    If next A1c is higher, consider covering the bedtime snack with a little insulin dose.     2. Diabetes Complications: With nephropathy, peripheral neuropathy, cardiovascular disease and cerebrovascular disease.     3. Blood Pressure Management: Blood pressure is controlled. Currently is on pharmacotherapy for this.     4.Lipid Management: Per the new ACC/MIKE/NHLBI guidelines, statins are recommended for individuals with diabetes aged 40-75 with LDL  without ASCVD, and for any individual with ASCVD. Currently the patient is on a statin.     5.  Smoking Status: Patient Pt is smoke free..       Follow up: in 3 months with Renetta, annual with me    Tamela Herrera MD  Endocrinology, Diabetes and Metabolism  [facility]

## 2019-01-18 NOTE — PATIENT INSTRUCTIONS
Schedule with Renetta Washington in 3 months  Schedule with me in 1 year.      Tamela Herrera MD  Endocrinology, Diabetes and Metabolism  [facility]

## 2019-01-24 ENCOUNTER — MEDICAL CORRESPONDENCE (OUTPATIENT)
Dept: HEALTH INFORMATION MANAGEMENT | Facility: CLINIC | Age: 69
End: 2019-01-24

## 2019-01-24 ENCOUNTER — OFFICE VISIT (OUTPATIENT)
Dept: ENDOCRINOLOGY | Facility: CLINIC | Age: 69
End: 2019-01-24
Payer: COMMERCIAL

## 2019-01-24 DIAGNOSIS — E11.49 TYPE II OR UNSPECIFIED TYPE DIABETES MELLITUS WITH NEUROLOGICAL MANIFESTATIONS, NOT STATED AS UNCONTROLLED(250.60) (H): Primary | ICD-10-CM

## 2019-01-24 DIAGNOSIS — E11.51 DIABETES MELLITUS WITH PERIPHERAL VASCULAR DISEASE (H): ICD-10-CM

## 2019-01-24 DIAGNOSIS — B35.1 DERMATOPHYTOSIS OF NAIL: ICD-10-CM

## 2019-01-24 DIAGNOSIS — S90.821A BLISTER OF RIGHT FOOT, INITIAL ENCOUNTER: ICD-10-CM

## 2019-01-24 NOTE — LETTER
1/24/2019       RE: Cristal Preciado  [facility]  [address]  [place] [address]     Dear Colleague,    Thank you for referring your patient, Cristal Preciado, to the [facility] ENDOCRINOLOGY at [facility]. Please see a copy of my visit note below.    Past Medical History:   Diagnosis Date     AION (anterior ischaemic optic neuropathy), left eye     NAION LE     CAD (coronary artery disease) 3/2009    [facility]; Angio 2013 [facility]- normal coronary arteries     Cataract      CVA (cerebral vascular accident) (H)     admitted at [facility]     Depression     on Cymbalta     Diabetes mellitus, type 2 (H)      Diabetic nephropathy (H)      Diabetic neuropathy (H)     severe     Diabetic retinopathy (H)      GERD (gastroesophageal reflux disease)      Hyperlipidemia      Hypertension     ECHO 2013, TDS, NL EF     POAG (primary open-angle glaucoma)     adv BE     Seasonal allergies      Tubular adenoma of colon 2013    repeat colonoscopy in 2018     Patient Active Problem List   Diagnosis     Cataract     CAD (coronary artery disease)     Diabetes mellitus, type 2 (H)     Diabetic nephropathy (H)     Diabetic neuropathy (H)     Diabetic retinopathy (H)     GERD (gastroesophageal reflux disease)     Glaucoma     Hypertension     Hyperlipidemia     CVA (cerebral vascular accident) (H)     Morbid obesity (H)     Anemia     Seasonal allergies     Depression     Tubular adenoma of colon     Leg weakness     Dermatitis, seborrheic     Eczematous dermatitis     History of coronary artery disease     Venous stasis dermatitis of both lower extremities     Uncontrolled type 2 diabetes mellitus (H)     Chronic dermatitis of hands     Neoplasm of uncertain behavior of skin     Past Surgical History:   Procedure Laterality Date     COLONOSCOPY  7/15/2013    Tubular adenoma; repeat in 2018;Procedure: COMBINED COLONOSCOPY, SINGLE BIOPSY/POLYPECTOMY BY BIOPSY;;  Surgeon:  Don King MD;  Tubular adenoma     EXTRACAPSULAR CATARACT EXTRATION WITH INTRAOCULAR LENS IMPLANT  11-10-09, 2-9-10    11-10-09 Lt, 2-9-10 Rt; Left eye 2012     Social History     Socioeconomic History     Marital status: Single     Spouse name: Not on file     Number of children: Not on file     Years of education: Not on file     Highest education level: Not on file   Social Needs     Financial resource strain: Not on file     Food insecurity - worry: Not on file     Food insecurity - inability: Not on file     Transportation needs - medical: Not on file     Transportation needs - non-medical: Not on file   Occupational History     Not on file   Tobacco Use     Smoking status: Former Smoker     Last attempt to quit: 3/6/2009     Years since quittin.8     Smokeless tobacco: Never Used   Substance and Sexual Activity     Alcohol use: No     Drug use: No     Sexual activity: No   Other Topics Concern     Parent/sibling w/ CABG, MI or angioplasty before 65F 55M? Not Asked   Social History Narrative    Pt has three daughters and two sons, single.  Her daughter Court, see's her often at the Nursing Home ([facility]).  Moved into Nursing Home in 2011 after a hospitalization for ALY.  Moved from [place] in  to [facility]. Has 5 grandchildren.     Family History   Problem Relation Age of Onset     Hypertension Mother      Cerebrovascular Disease Mother      Glaucoma Father      Cancer Father      Diabetes Sister      Glaucoma Sister      Cancer - colorectal Other      Cerebrovascular Disease Other      Skin Cancer No family hx of      Melanoma No family hx of      Last Comprehensive Metabolic Panel:  Sodium   Date Value Ref Range Status   10/24/2016 140 133 - 144 mmol/L Final     Potassium   Date Value Ref Range Status   2018 4.5 3.5 - 5.0 mmol/L Final     Chloride   Date Value Ref Range Status   10/24/2016 107 94 - 109 mmol/L Final     Carbon Dioxide   Date Value Ref Range  Status   10/24/2016 26 20 - 32 mmol/L Final     Anion Gap   Date Value Ref Range Status   10/24/2016 7 3 - 14 mmol/L Final     Glucose   Date Value Ref Range Status   10/24/2016 154 (H) 70 - 99 mg/dL Final     Urea Nitrogen   Date Value Ref Range Status   10/24/2016 16 7 - 30 mg/dL Final     Creatinine   Date Value Ref Range Status   03/19/2018 1.10 0.5 - 1.10 mg/dL Final     GFR Estimate   Date Value Ref Range Status   03/19/2018 60 >50 ml/min/1.73m2 Final     Calcium   Date Value Ref Range Status   10/24/2016 9.6 8.5 - 10.1 mg/dL Final     A1c                            7.1                       10/10/2018  SUBJECTIVE FINDINGS: A 68-year-old female returns to clinic for diabetic foot care and toenail care.  Relates to peripheral neuropathy and is wearing Diabetic shoes and compression socks.  Presents in her wheelchair.  Relates she has a sore on her right heel that she noticed on Friday, she relates the nursing does put pillows underneath her legs to help keep the pressure off the heels, relates to no specific injuries.      OBJECTIVE FINDINGS: DP and PT are 1/4 bilaterally.  She has decreased hair growth bilaterally.  She has minimal peripheral edema bilaterally.  She has incurvated, thickened, brittle nails with subungual debris, dystrophy and discoloration 1, 2 and 5 bilaterally and to a lesser degree 3 and 4 bilaterally.  She is a right plantar medial heel dry intact blood type blister that appears to be healing, its through the epidermis.  There is no erythema, no drainage, no odor, no calor bilaterally.  Sharp/dull is decreased with 5.07 Neponset Weistien monofilament bilaterally.  She has decreased ankle joint dorsiflexion bilaterally, deep tendon reflexes are intact.       ASSESSMENT AND PLAN: Onychomycosis bilaterally, onychauxis bilaterally.  Pressure blister right heel that appears to be healing.  Will have nursing check this daily for healing and continue pressure offloading.  She is diabetic with  peripheral neuropathy and vascular disease.  Diagnosis and treatment options discussed with her.  All the nails were debrided or reduced bilaterally upon consent.  Six nails were debrided.  She will return to clinic and see me in 3 months.       Again, thank you for allowing me to participate in the care of your patient.      Sincerely,    Vu Grant DPM

## 2019-01-24 NOTE — PROGRESS NOTES
Past Medical History:   Diagnosis Date     AION (anterior ischaemic optic neuropathy), left eye     NAION LE     CAD (coronary artery disease) 3/2009    [facility]; Angio 2013 [facility]- normal coronary arteries     Cataract      CVA (cerebral vascular accident) (H)     admitted at [facility]     on Cymbalta     Diabetes mellitus, type 2 (H)      Diabetic nephropathy (H)      Diabetic neuropathy (H)     severe     Diabetic retinopathy (H)      GERD (gastroesophageal reflux disease)      Hyperlipidemia      Hypertension     ECHO 2013, TDS, NL EF     POAG (primary open-angle glaucoma)     adv BE     Seasonal allergies      Tubular adenoma of colon 2013    repeat colonoscopy in 2018     Patient Active Problem List   Diagnosis     Cataract     CAD (coronary artery disease)     Diabetes mellitus, type 2 (H)     Diabetic nephropathy (H)     Diabetic neuropathy (H)     Diabetic retinopathy (H)     GERD (gastroesophageal reflux disease)     Glaucoma     Hypertension     Hyperlipidemia     CVA (cerebral vascular accident) (H)     Morbid obesity (H)     Anemia     Seasonal allergies     Depression     Tubular adenoma of colon     Leg weakness     Dermatitis, seborrheic     Eczematous dermatitis     History of coronary artery disease     Venous stasis dermatitis of both lower extremities     Uncontrolled type 2 diabetes mellitus (H)     Chronic dermatitis of hands     Neoplasm of uncertain behavior of skin     Past Surgical History:   Procedure Laterality Date     COLONOSCOPY  7/15/2013    Tubular adenoma; repeat in 2018;Procedure: COMBINED COLONOSCOPY, SINGLE BIOPSY/POLYPECTOMY BY BIOPSY;;  Surgeon: Don King MD;  Tubular adenoma     EXTRACAPSULAR CATARACT EXTRATION WITH INTRAOCULAR LENS IMPLANT  11-10-09, 2-9-10    11-10-09 Lt, 2-9-10 Rt; Left eye 8/2012     Social History     Socioeconomic History     Marital status: Single     Spouse name: Not on file     Number of children: Not on file     Years  of education: Not on file     Highest education level: Not on file   Social Needs     Financial resource strain: Not on file     Food insecurity - worry: Not on file     Food insecurity - inability: Not on file     Transportation needs - medical: Not on file     Transportation needs - non-medical: Not on file   Occupational History     Not on file   Tobacco Use     Smoking status: Former Smoker     Last attempt to quit: 3/6/2009     Years since quittin.8     Smokeless tobacco: Never Used   Substance and Sexual Activity     Alcohol use: No     Drug use: No     Sexual activity: No   Other Topics Concern     Parent/sibling w/ CABG, MI or angioplasty before 65F 55M? Not Asked   Social History Narrative    Pt has three daughters and two sons, single.  Her daughter Court, see's her often at the Nursing Home ([facility]).  Moved into Nursing Home in 2011 after a hospitalization for ALY.  Moved from [place] in  to [facility]. Has 5 grandchildren.     Family History   Problem Relation Age of Onset     Hypertension Mother      Cerebrovascular Disease Mother      Glaucoma Father      Cancer Father      Diabetes Sister      Glaucoma Sister      Cancer - colorectal Other      Cerebrovascular Disease Other      Skin Cancer No family hx of      Melanoma No family hx of      Last Comprehensive Metabolic Panel:  Sodium   Date Value Ref Range Status   10/24/2016 140 133 - 144 mmol/L Final     Potassium   Date Value Ref Range Status   2018 4.5 3.5 - 5.0 mmol/L Final     Chloride   Date Value Ref Range Status   10/24/2016 107 94 - 109 mmol/L Final     Carbon Dioxide   Date Value Ref Range Status   10/24/2016 26 20 - 32 mmol/L Final     Anion Gap   Date Value Ref Range Status   10/24/2016 7 3 - 14 mmol/L Final     Glucose   Date Value Ref Range Status   10/24/2016 154 (H) 70 - 99 mg/dL Final     Urea Nitrogen   Date Value Ref Range Status   10/24/2016 16 7 - 30 mg/dL Final     Creatinine   Date Value Ref  Range Status   03/19/2018 1.10 0.5 - 1.10 mg/dL Final     GFR Estimate   Date Value Ref Range Status   03/19/2018 60 >50 ml/min/1.73m2 Final     Calcium   Date Value Ref Range Status   10/24/2016 9.6 8.5 - 10.1 mg/dL Final     A1c                            7.1                       10/10/2018  SUBJECTIVE FINDINGS: A 68-year-old female returns to clinic for diabetic foot care and toenail care.  Relates to peripheral neuropathy and is wearing Diabetic shoes and compression socks.  Presents in her wheelchair.  Relates she has a sore on her right heel that she noticed on Friday, she relates the nursing does put pillows underneath her legs to help keep the pressure off the heels, relates to no specific injuries.      OBJECTIVE FINDINGS: DP and PT are 1/4 bilaterally.  She has decreased hair growth bilaterally.  She has minimal peripheral edema bilaterally.  She has incurvated, thickened, brittle nails with subungual debris, dystrophy and discoloration 1, 2 and 5 bilaterally and to a lesser degree 3 and 4 bilaterally.  She is a right plantar medial heel dry intact blood type blister that appears to be healing, its through the epidermis.  There is no erythema, no drainage, no odor, no calor bilaterally.  Sharp/dull is decreased with 5.07 Taylor Weistien monofilament bilaterally.  She has decreased ankle joint dorsiflexion bilaterally, deep tendon reflexes are intact.       ASSESSMENT AND PLAN: Onychomycosis bilaterally, onychauxis bilaterally.  Pressure blister right heel that appears to be healing.  Will have nursing check this daily for healing and continue pressure offloading.  She is diabetic with peripheral neuropathy and vascular disease.  Diagnosis and treatment options discussed with her.  All the nails were debrided or reduced bilaterally upon consent.  Six nails were debrided.  She will return to clinic and see me in 3 months.

## 2019-02-22 ENCOUNTER — OFFICE VISIT - HEALTHEAST (OUTPATIENT)
Dept: GERIATRICS | Facility: CLINIC | Age: 69
End: 2019-02-22

## 2019-02-22 DIAGNOSIS — S90.821A BLISTER OF RIGHT HEEL, INITIAL ENCOUNTER: ICD-10-CM

## 2019-03-18 ENCOUNTER — COMMUNICATION - HEALTHEAST (OUTPATIENT)
Dept: GERIATRICS | Facility: CLINIC | Age: 69
End: 2019-03-18

## 2019-03-18 DIAGNOSIS — R52 PAIN: ICD-10-CM

## 2019-03-20 ENCOUNTER — COMMUNICATION - HEALTHEAST (OUTPATIENT)
Dept: GERIATRICS | Facility: CLINIC | Age: 69
End: 2019-03-20

## 2019-03-21 ENCOUNTER — RECORDS - HEALTHEAST (OUTPATIENT)
Dept: LAB | Facility: CLINIC | Age: 69
End: 2019-03-21

## 2019-03-21 LAB
ALBUMIN UR-MCNC: ABNORMAL MG/DL
APPEARANCE UR: ABNORMAL
BACTERIA #/AREA URNS HPF: ABNORMAL HPF
BILIRUB UR QL STRIP: NEGATIVE
COLOR UR AUTO: YELLOW
GLUCOSE UR STRIP-MCNC: NEGATIVE MG/DL
HGB UR QL STRIP: ABNORMAL
KETONES UR STRIP-MCNC: NEGATIVE MG/DL
LEUKOCYTE ESTERASE UR QL STRIP: ABNORMAL
NITRATE UR QL: NEGATIVE
PH UR STRIP: 7 [PH] (ref 4.5–8)
RBC #/AREA URNS AUTO: ABNORMAL HPF
SP GR UR STRIP: 1.01 (ref 1–1.03)
SQUAMOUS #/AREA URNS AUTO: ABNORMAL LPF
UROBILINOGEN UR STRIP-ACNC: ABNORMAL
WBC #/AREA URNS AUTO: ABNORMAL HPF
WBC CLUMPS #/AREA URNS HPF: PRESENT /[HPF]

## 2019-03-22 ENCOUNTER — COMMUNICATION - HEALTHEAST (OUTPATIENT)
Dept: GERIATRICS | Facility: CLINIC | Age: 69
End: 2019-03-22

## 2019-03-22 LAB — BACTERIA SPEC CULT: NORMAL

## 2019-03-28 ENCOUNTER — OFFICE VISIT (OUTPATIENT)
Dept: ENDOCRINOLOGY | Facility: CLINIC | Age: 69
End: 2019-03-28
Payer: COMMERCIAL

## 2019-03-28 ENCOUNTER — OFFICE VISIT - HEALTHEAST (OUTPATIENT)
Dept: GERIATRICS | Facility: CLINIC | Age: 69
End: 2019-03-28

## 2019-03-28 DIAGNOSIS — G62.9 NEUROPATHY: ICD-10-CM

## 2019-03-28 DIAGNOSIS — E11.51 DIABETES MELLITUS WITH PERIPHERAL VASCULAR DISEASE (H): ICD-10-CM

## 2019-03-28 DIAGNOSIS — I10 ESSENTIAL HYPERTENSION: ICD-10-CM

## 2019-03-28 DIAGNOSIS — E11.49 TYPE II OR UNSPECIFIED TYPE DIABETES MELLITUS WITH NEUROLOGICAL MANIFESTATIONS, NOT STATED AS UNCONTROLLED(250.60) (H): Primary | ICD-10-CM

## 2019-03-28 DIAGNOSIS — B35.1 DERMATOPHYTOSIS OF NAIL: ICD-10-CM

## 2019-03-28 DIAGNOSIS — Z86.73 HISTORY OF STROKE: ICD-10-CM

## 2019-03-28 NOTE — PROGRESS NOTES
Past Medical History:   Diagnosis Date     AION (anterior ischaemic optic neuropathy), left eye     NAION LE     CAD (coronary artery disease) 3/2009    [facility]; Angio 2013 UM- normal coronary arteries     Cataract      CVA (cerebral vascular accident) (H)     admitted at [facility]     on Cymbalta     Diabetes mellitus, type 2 (H)      Diabetic nephropathy (H)      Diabetic neuropathy (H)     severe     Diabetic retinopathy (H)      GERD (gastroesophageal reflux disease)      Hyperlipidemia      Hypertension     ECHO 2013, TDS, NL EF     POAG (primary open-angle glaucoma)     adv BE     Seasonal allergies      Tubular adenoma of colon 2013    repeat colonoscopy in 2018     Patient Active Problem List   Diagnosis     Cataract     CAD (coronary artery disease)     Diabetes mellitus, type 2 (H)     Diabetic nephropathy (H)     Diabetic neuropathy (H)     Diabetic retinopathy (H)     GERD (gastroesophageal reflux disease)     Glaucoma     Hypertension     Hyperlipidemia     CVA (cerebral vascular accident) (H)     Morbid obesity (H)     Anemia     Seasonal allergies     Depression     Tubular adenoma of colon     Leg weakness     Dermatitis, seborrheic     Eczematous dermatitis     History of coronary artery disease     Venous stasis dermatitis of both lower extremities     Uncontrolled type 2 diabetes mellitus (H)     Chronic dermatitis of hands     Neoplasm of uncertain behavior of skin     Past Surgical History:   Procedure Laterality Date     COLONOSCOPY  7/15/2013    Tubular adenoma; repeat in 2018;Procedure: COMBINED COLONOSCOPY, SINGLE BIOPSY/POLYPECTOMY BY BIOPSY;;  Surgeon: Don King MD;  Tubular adenoma     EXTRACAPSULAR CATARACT EXTRATION WITH INTRAOCULAR LENS IMPLANT  11-10-09, 2-9-10    11-10-09 Lt, 2-9-10 Rt; Left eye 8/2012     Social History     Socioeconomic History     Marital status: Single     Spouse name: Not on file     Number of children: Not on file     Years  of education: Not on file     Highest education level: Not on file   Occupational History     Not on file   Social Needs     Financial resource strain: Not on file     Food insecurity:     Worry: Not on file     Inability: Not on file     Transportation needs:     Medical: Not on file     Non-medical: Not on file   Tobacco Use     Smoking status: Former Smoker     Last attempt to quit: 3/6/2009     Years since quitting: 10.0     Smokeless tobacco: Never Used   Substance and Sexual Activity     Alcohol use: No     Drug use: No     Sexual activity: No   Lifestyle     Physical activity:     Days per week: Not on file     Minutes per session: Not on file     Stress: Not on file   Relationships     Social connections:     Talks on phone: Not on file     Gets together: Not on file     Attends Judaism service: Not on file     Active member of club or organization: Not on file     Attends meetings of clubs or organizations: Not on file     Relationship status: Not on file     Intimate partner violence:     Fear of current or ex partner: Not on file     Emotionally abused: Not on file     Physically abused: Not on file     Forced sexual activity: Not on file   Other Topics Concern     Parent/sibling w/ CABG, MI or angioplasty before 65F 55M? Not Asked   Social History Narrative    Pt has three daughters and two sons, single.  Her daughter Court, see's her often at the Nursing Home ([facility]).  Moved into Nursing Home in October 2011 after a hospitalization for ALY.  Moved from [place] in 2002 to [place]". Has 5 grandchildren.     Family History   Problem Relation Age of Onset     Hypertension Mother      Cerebrovascular Disease Mother      Glaucoma Father      Cancer Father      Diabetes Sister      Glaucoma Sister      Cancer - colorectal Other      Cerebrovascular Disease Other      Skin Cancer No family hx of      Melanoma No family hx of      A1c                         7.1                             10/10/2018  SUBJECTIVE FINDINGS: A 69-year-old female returns to clinic for diabetic foot care and toenail care.  Relates to peripheral neuropathy and is wearing Diabetic shoes and compression socks.  Presents in her wheelchair.        OBJECTIVE FINDINGS: DP and PT are 1/4 bilaterally.  She has decreased hair growth bilaterally.  She has minimal peripheral edema bilaterally.  She has incurvated, thickened, brittle nails with subungual debris, dystrophy and discoloration 1, 2 and 5 bilaterally and to a lesser degree 3 and 4 bilaterally.  She is a right plantar medial heel dry intact skin, it appears to be healed blister hyperkeratotic skin.  There is no erythema, no drainage, no odor, no calor bilaterally.  She has decreased ankle joint dorsiflexion bilaterally.       ASSESSMENT AND PLAN: Onychomycosis bilaterally, onychauxis bilaterally.  Pressure blister right heel that appears to be healed, she still has thickened dyscolored skin present.  Will have nursing check this daily.  She is diabetic with peripheral neuropathy and vascular disease.  Diagnosis and treatment options discussed with her.  All the nails were debrided or reduced bilaterally upon consent.  She will return to clinic and see me in 2-3 months.

## 2019-03-28 NOTE — LETTER
3/28/2019       RE: Cristal Preciado  [facility]  [address]  [place] [address]     Dear Colleague,    Thank you for referring your patient, Cristal Preciado, to the [facility] ENDOCRINOLOGY at [facility]. Please see a copy of my visit note below.    Past Medical History:   Diagnosis Date     AION (anterior ischaemic optic neuropathy), left eye     NAION LE     CAD (coronary artery disease) 3/2009    [facility]; Angio 2013 UM- normal coronary arteries     Cataract      CVA (cerebral vascular accident) (H)     admitted at [facility]     Depression     on Cymbalta     Diabetes mellitus, type 2 (H)      Diabetic nephropathy (H)      Diabetic neuropathy (H)     severe     Diabetic retinopathy (H)      GERD (gastroesophageal reflux disease)      Hyperlipidemia      Hypertension     ECHO 2013, TDS, NL EF     POAG (primary open-angle glaucoma)     adv BE     Seasonal allergies      Tubular adenoma of colon 2013    repeat colonoscopy in 2018     Patient Active Problem List   Diagnosis     Cataract     CAD (coronary artery disease)     Diabetes mellitus, type 2 (H)     Diabetic nephropathy (H)     Diabetic neuropathy (H)     Diabetic retinopathy (H)     GERD (gastroesophageal reflux disease)     Glaucoma     Hypertension     Hyperlipidemia     CVA (cerebral vascular accident) (H)     Morbid obesity (H)     Anemia     Seasonal allergies     Depression     Tubular adenoma of colon     Leg weakness     Dermatitis, seborrheic     Eczematous dermatitis     History of coronary artery disease     Venous stasis dermatitis of both lower extremities     Uncontrolled type 2 diabetes mellitus (H)     Chronic dermatitis of hands     Neoplasm of uncertain behavior of skin     Past Surgical History:   Procedure Laterality Date     COLONOSCOPY  7/15/2013    Tubular adenoma; repeat in 2018;Procedure: COMBINED COLONOSCOPY, SINGLE BIOPSY/POLYPECTOMY BY BIOPSY;;  Surgeon:  Don King MD;  Tubular adenoma     EXTRACAPSULAR CATARACT EXTRATION WITH INTRAOCULAR LENS IMPLANT  11-10-09, 2-9-10    11-10-09 Lt, 2-9-10 Rt; Left eye 8/2012     Social History     Socioeconomic History     Marital status: Single     Spouse name: Not on file     Number of children: Not on file     Years of education: Not on file     Highest education level: Not on file   Occupational History     Not on file   Social Needs     Financial resource strain: Not on file     Food insecurity:     Worry: Not on file     Inability: Not on file     Transportation needs:     Medical: Not on file     Non-medical: Not on file   Tobacco Use     Smoking status: Former Smoker     Last attempt to quit: 3/6/2009     Years since quitting: 10.0     Smokeless tobacco: Never Used   Substance and Sexual Activity     Alcohol use: No     Drug use: No     Sexual activity: No   Lifestyle     Physical activity:     Days per week: Not on file     Minutes per session: Not on file     Stress: Not on file   Relationships     Social connections:     Talks on phone: Not on file     Gets together: Not on file     Attends Jehovah's witness service: Not on file     Active member of club or organization: Not on file     Attends meetings of clubs or organizations: Not on file     Relationship status: Not on file     Intimate partner violence:     Fear of current or ex partner: Not on file     Emotionally abused: Not on file     Physically abused: Not on file     Forced sexual activity: Not on file   Other Topics Concern     Parent/sibling w/ CABG, MI or angioplasty before 65F 55M? Not Asked   Social History Narrative    Pt has three daughters and two sons, single.  Her daughter Court, see's her often at the Nursing Home ([facility]).  Moved into Nursing Home in October 2011 after a hospitalization for ALY.  Moved from [place] in 2002 to [facility]. Has 5 grandchildren.     Family History   Problem Relation Age of Onset     Hypertension  Mother      Cerebrovascular Disease Mother      Glaucoma Father      Cancer Father      Diabetes Sister      Glaucoma Sister      Cancer - colorectal Other      Cerebrovascular Disease Other      Skin Cancer No family hx of      Melanoma No family hx of      A1c                         7.1                            10/10/2018  SUBJECTIVE FINDINGS: A 69-year-old female returns to clinic for diabetic foot care and toenail care.  Relates to peripheral neuropathy and is wearing Diabetic shoes and compression socks.  Presents in her wheelchair.        OBJECTIVE FINDINGS: DP and PT are 1/4 bilaterally.  She has decreased hair growth bilaterally.  She has minimal peripheral edema bilaterally.  She has incurvated, thickened, brittle nails with subungual debris, dystrophy and discoloration 1, 2 and 5 bilaterally and to a lesser degree 3 and 4 bilaterally.  She is a right plantar medial heel dry intact skin, it  appears to be healed blister hyperkeratotic skin.  There is no erythema, no drainage, no odor, no calor bilaterally.  She has decreased ankle joint dorsiflexion bilaterally.       ASSESSMENT AND PLAN: Onychomycosis bilaterally, onychauxis bilaterally.  Pressure blister right heel that appears to be healed, she still has thickened dyscolored skin present.  Will have nursing check this daily.  She is diabetic with peripheral neuropathy and vascular disease.  Diagnosis and treatment options discussed with her.  All the nails were debrided or reduced bilaterally upon consent.  She will return to clinic and see me in 2-3 months.     Again, thank you for allowing me to participate in the care of your patient.      Sincerely,    Vu Grant DPM

## 2019-04-01 ENCOUNTER — OFFICE VISIT (OUTPATIENT)
Dept: OPHTHALMOLOGY | Facility: CLINIC | Age: 69
End: 2019-04-01
Attending: OPHTHALMOLOGY
Payer: COMMERCIAL

## 2019-04-01 DIAGNOSIS — H40.1133 PRIMARY OPEN ANGLE GLAUCOMA OF BOTH EYES, SEVERE STAGE: Primary | ICD-10-CM

## 2019-04-01 PROCEDURE — G0463 HOSPITAL OUTPT CLINIC VISIT: HCPCS | Mod: ZF

## 2019-04-01 PROCEDURE — 92133 CPTRZD OPH DX IMG PST SGM ON: CPT | Mod: ZF | Performed by: OPHTHALMOLOGY

## 2019-04-01 ASSESSMENT — CUP TO DISC RATIO
OD_RATIO: 0.8
OS_RATIO: 0.8

## 2019-04-01 ASSESSMENT — SLIT LAMP EXAM - LIDS
COMMENTS: NORMAL
COMMENTS: NORMAL

## 2019-04-01 ASSESSMENT — VISUAL ACUITY
OD_CC+: -2
OS_CC+: -2
CORRECTION_TYPE: GLASSES
OD_CC: 20/20
METHOD: SNELLEN - LINEAR
OS_CC: 20/70

## 2019-04-01 ASSESSMENT — TONOMETRY
IOP_METHOD: TONOPEN
OS_IOP_MMHG: 20
OD_IOP_MMHG: 20

## 2019-04-01 ASSESSMENT — CONF VISUAL FIELD
OS_INFERIOR_NASAL_RESTRICTION: 3
OS_SUPERIOR_NASAL_RESTRICTION: 3
OS_SUPERIOR_TEMPORAL_RESTRICTION: 3
OD_NORMAL: 1
OS_INFERIOR_TEMPORAL_RESTRICTION: 3

## 2019-04-01 NOTE — NURSING NOTE
Chief Complaints and History of Present Illnesses   Patient presents with     Glaucoma Follow-Up     Chief Complaint(s) and History of Present Illness(es)     Glaucoma Follow-Up     Laterality: both eyes              Comments     Pt. States that she has had a lot of dryness BE with excessive watering.  VA has been fluctuating a lot.  Lab Results       Component                Value               Date                       A1C                      7.5                 10/24/2016                 A1C                      7.5                 10/12/2015                 A1C                      8.1                 03/06/2014                 A1C                      7.2                 03/07/2013                 A1C                      7.7                 02/13/2013            Diana Wright COT 1:58 PM April 1, 2019

## 2019-04-01 NOTE — PROGRESS NOTES
Primary open angle glaucoma (POAG) adv both eyes   [place] both eyes    Right eye 9/05   Left eye 5/04  NAION left eye  Cataract extraction with intraocular lens    Right eye 2-9-10   Left eye 8-14-12  Diabetes mellitus-no retinopathy last time    Current medications:    Latanoprost both eyes at bedtime   Cosopt both eyes twice a day  Pataday as needed for allergy     RV every 6 months to make sure tubes still covered (covered with conjunctiva only)  Cannot do visual field,  OCT with floor effect-would not repeat  Return to clinic 6 months for intraocular pressure and tube check   (dilated exam Diabetes mellitus every spring)      Attending Physician Attestation:  Complete documentation of historical and exam elements from today's encounter can be found in the full encounter summary report (not reduplicated in this progress note). I personally obtained the chief complaint(s) and history of present illness. I confirmed and edited asnecessary the review of systems, past medical/surgical history, family history, social history, and examination findings as documented by others; and I examined the patient myself. I personally reviewed the relevant tests, images, and reports as documented above. I formulated and edited as necessary the assessment and plan and discussed the findings and management plan with the patient and family.  - Bette Delong MD 10:08 AM 10/31/2018

## 2019-04-10 ENCOUNTER — MEDICAL CORRESPONDENCE (OUTPATIENT)
Dept: HEALTH INFORMATION MANAGEMENT | Facility: CLINIC | Age: 69
End: 2019-04-10

## 2019-05-08 ENCOUNTER — OFFICE VISIT - HEALTHEAST (OUTPATIENT)
Dept: GERIATRICS | Facility: CLINIC | Age: 69
End: 2019-05-08

## 2019-05-08 DIAGNOSIS — H61.21 IMPACTED CERUMEN OF RIGHT EAR: ICD-10-CM

## 2019-05-08 DIAGNOSIS — I10 ESSENTIAL HYPERTENSION: ICD-10-CM

## 2019-05-08 DIAGNOSIS — G89.29 OTHER CHRONIC PAIN: ICD-10-CM

## 2019-05-13 ENCOUNTER — MEDICAL CORRESPONDENCE (OUTPATIENT)
Dept: HEALTH INFORMATION MANAGEMENT | Facility: CLINIC | Age: 69
End: 2019-05-13

## 2019-05-15 ENCOUNTER — OFFICE VISIT (OUTPATIENT)
Dept: OBGYN | Facility: CLINIC | Age: 69
End: 2019-05-15
Attending: OBSTETRICS & GYNECOLOGY
Payer: COMMERCIAL

## 2019-05-15 VITALS — SYSTOLIC BLOOD PRESSURE: 116 MMHG | DIASTOLIC BLOOD PRESSURE: 63 MMHG | HEART RATE: 82 BPM

## 2019-05-15 DIAGNOSIS — N95.0 POST-MENOPAUSAL BLEEDING: Primary | ICD-10-CM

## 2019-05-15 PROCEDURE — 88305 TISSUE EXAM BY PATHOLOGIST: CPT | Performed by: OBSTETRICS & GYNECOLOGY

## 2019-05-15 PROCEDURE — G0463 HOSPITAL OUTPT CLINIC VISIT: HCPCS | Mod: ZF

## 2019-05-15 NOTE — LETTER
"    5/31/2019         Cristal Preciado   [facility]  [address]  [place] [address]        Dear Ms. Preciado:    The results of your recent biopsy were ABNORMAL.  There are precancer cells present and this requires follow up discussion with a GYN ONCOLOGIST.  I can help facilitate that appointment for you when we see each other on June 3rd.      Results for orders placed or performed in visit on 05/15/19   Surgical pathology exam   Result Value Ref Range    Copath Report       Patient Name: CRISTAL PRECIADO  MR#: [id number]  Specimen #: [id number]  Collected: 5/15/2019  Received: 5/16/2019  Reported: 5/24/2019 21:27  Ordering Phy(s): CLEO YOUNG    For improved result formatting, select 'View Enhanced Report Format' under   Linked Documents section.    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    Electronically signed out by:    Jodi Hernández M.D., [facility]    CLINICAL HISTORY:  The patient is a 69-year-old woman with postmenopausal bleeding.    Procedure: Endometrial biopsy.    GROSS:  The specimen is received in formalin with proper patient identification,   labeled \"EMB\".  The specimen consists  of a 2.2 x 1.2 x  0.3 cm aggregate of red-brown soft tissue fragments,   which are filtered, wrapped, and  entirely submitted in cassette A1. (Dictated by: Debbie Fishman 5/16/2019   09:46 AM)    MICROSCOPIC:  Microscopic examination is performed.    The technical component of this testing was completed at the [facility], with the professional component performed   at the [facility]" [facility], [address] [place],   [place] [address] ([phone])    CPT Codes:  A: 51470-OK0    COLLECTION SITE:  Client: [facility]  Location: [facility] (B)             Please note that test explanations are brief and do not reflect all diagnostic uses.  If you have any questions or concerns, please call the clinic at [phone].      Sincerely,      Claribel Kerns MD

## 2019-05-15 NOTE — PROGRESS NOTES
"Pt seen from care facility for brown vaginal discharge vs incontinence of stool.  Pt wears depends at baseline. She has significant urinary incontinence.  She has no pain.  She denies any beka blood. Per her report, she cannot tell much about discharge, but her care givers say it's been about 2 wks.     /63 (BP Location: Left arm, Patient Position: Chair)   Pulse 82   Breastfeeding? No     Endometrial Biopsy                                                                       Time Out - \"Pause for the Cause\"  Just before the procedure begins, through verbal and active participation of team members, verify:    Initials   Patient Name    smd   Patient Date of Birth smd   Procedure to be performed  smd   Site, laterality, level or multiples, noting patient position   smd   Relevant images or diagnostics available/displayed   smd   Implants, special equipment or special requirements        smd     Consent:  Verbal consent obtained from patient.  and Written consent signed and scanned into medical record.    Indication: Postmenopausal bleeding      Using a large Graves speculum, the cervix was visualized. The cervix was prepped with Betadine.  A single tooth tenaculum was applied to the anterior lip of the cervix. The endometrial pipelle was advanced through the cervix without difficulty and a sample collected. One additional pass was made.  The tenaculum was removed from the cervix and the tenaculum site made hemostatic with pressure.    EBL: 2cc  Complications:  none  Pathology: EMB sample was sent to pathology.  Tolerance of Procedure:  Patient did tolerate the procedure well.     Patient was instructed to call if she experiences any heavy bleeding, severe cramping, or abnormal vaginal discharge.  May take ibuprofen 400-800 mg PO TID PRN or naproxen 500 mg PO BID for cramping.    Will notify patient of results via phone call and next visit here in 2-3 wks.    Claribel Kerns MD, [facility]  Women's Health " Specialists Staff  OB/GYN    5/15/2019  3:48 PM

## 2019-05-15 NOTE — LETTER
"5/15/2019     RE: Cristal Preciado  [facility]  [address]  [place] [address]     Dear Colleague,    Thank you for referring your patient, Cristal Preciado, to the [facility] at [facility]. Please see a copy of my visit note below.    Pt seen from care facility for brown vaginal discharge vs incontinence of stool.  Pt wears depends at baseline. She has significant urinary incontinence.  She has no pain.  She denies any beka blood. Per her report, she cannot tell much about discharge, but her care givers say it's been about 2 wks.     /63 (BP Location: Left arm, Patient Position: Chair)   Pulse 82   Breastfeeding? No     Endometrial Biopsy                                                                       Time Out - \"Pause for the Cause\"  Just before the procedure begins, through verbal and active participation of team members, verify:    Initials   Patient Name    smd   Patient Date of Birth smd   Procedure to be performed  smd   Site, laterality, level or multiples, noting patient position   smd   Relevant images or diagnostics available/displayed   smd   Implants, special equipment or special requirements        smd     Consent:  Verbal consent obtained from patient.  and Written consent signed and scanned into medical record.    Indication: Postmenopausal bleeding      Using a large Graves speculum, the cervix was visualized. The cervix was prepped with Betadine.  A single tooth tenaculum was applied to the anterior lip of the cervix. The endometrial pipelle was advanced through the cervix without difficulty and a sample collected. One additional pass was made.  The tenaculum was removed from the cervix and the tenaculum site made hemostatic with pressure.    EBL: 2cc  Complications:  none  Pathology: EMB sample was sent to pathology.  Tolerance of Procedure:  Patient did tolerate the procedure well.     Patient " was instructed to call if she experiences any heavy bleeding, severe cramping, or abnormal vaginal discharge.  May take ibuprofen 400-800 mg PO TID PRN or naproxen 500 mg PO BID for cramping.    Will notify patient of results via phone call and next visit here in 2-3 wks.    Claribel Kerns MD, FACOG  [facility]  OB/GYN    5/15/2019  3:48 PM

## 2019-05-15 NOTE — NURSING NOTE
Chief Complaint   Patient presents with     Vaginal Problem     Vaginal discharge       See ALEJANDRA Fischer 5/15/2019

## 2019-05-24 ENCOUNTER — OFFICE VISIT (OUTPATIENT)
Dept: ENDOCRINOLOGY | Facility: CLINIC | Age: 69
End: 2019-05-24
Payer: COMMERCIAL

## 2019-05-24 VITALS — DIASTOLIC BLOOD PRESSURE: 72 MMHG | HEART RATE: 73 BPM | SYSTOLIC BLOOD PRESSURE: 140 MMHG

## 2019-05-24 DIAGNOSIS — E11.9 WELL CONTROLLED TYPE 2 DIABETES MELLITUS (H): Primary | ICD-10-CM

## 2019-05-24 LAB
COPATH REPORT: NORMAL
HBA1C MFR BLD: 6.5 % (ref 4.3–6)

## 2019-05-24 ASSESSMENT — PAIN SCALES - GENERAL: PAINLEVEL: NO PAIN (0)

## 2019-05-24 NOTE — PROGRESS NOTES
HPI   Ms. Preciado returns for follow up of long standing type 2 diabetes.  She is currently being managed on U-500 insulin (added in 2017) and Metformin 1000 mg daily (added in 2018), Victoza 1.8 mg daily (several years).  She likes this much better than her previous regimen.  Her insulin requirements have slowly decreased since the addition of Metformin.  She has also been losing weight (about 10 pounds in the past few months).  Her appetite is less.   She is currently taking U-500 60 units TID.  Her caretakers at [facility] have been testing her blood sugars 4 times a day. She has had no hypoglycemia, but reports that she occasionally has readings in the 90's.  AM readings are the highest- around 154-216, lunch readings are 158-239 mg/dL,  Dinner- , HS-  mg/dL.  She always has an HS snack to prevent overnight hypoglycemia.   She is also on liraglutide 1.8 mg daily.  She has snacks in her room- usually fig bars and Oreos.      She has been frustrated with being wheelchair bound.  She has been told by the therapists that she will never walk again, however she feels she could walk if she slowly gains her strength.  She is complaining of severe pain in her hands when they are cold.  The air conditioning worsens it.  She wears gloves constantly.  She did not have relief after carpal tunnel surgery.  She is taking gabapentin for neuropathy.  She is also on Cymbalta.  Neuropathy in feet better.  She is seeing Dr. Grant for her feet. She has ongoing lower back pain.  She has been losing weight and she is not sure why.  She has not been eating as much.   She otherwise has been feeling well and in her usual state of health. She has no other concerns today.     ROS   GENERAL: Reports weight is 217# (measured at nursing home).  She continues losing weight, but she is not trying to.  No fevers, chills,  night sweats.  HEENT: no dysphagia, diplopia, neck pain or tenderness, dry/scratchy eyes, URI, cough,  "sinus drainage, tinnitus, sinus pressure.   CV: No CP, SOB.  No palpitations, skipped beats, LOC.  LUNGS: no cough, sputum production, wheezing   ABDOMEN: no diarrhea, constipation, abdominal pain  EXTREMITIES:  She has no more pedal edema.  Hands still swollen, but having less swelling in her ankles and feet.  Has thickened toenails, not cutting into skin.  No pain. She sees Dr. Grant.  Dry skin.  Leg ulcerations healed.  Callus on left heel.    NEUROLOGY: no changes in vision.  Continued tingling and numbness in hands and feet.   MSK: weakness in legs after stroke.  Needs help getting out of wheelchair. Does not use walker any more due to weakness.      PMH   Type 2 diabetes  Neuropathy   Nephropathy  Stroke - uses a walker, but mainly in wheelchair.   CAD, s/p stent   HTN  Dyslipidemia  Cataracts  Glaucoma  GERD    Family Hx:   Mom- stroke  Dad- cancer  1 of 12 children  2 brothers and 2 sisters-  cancer- unsure of type  1 sister with diabetes, on insulin  5 children  Son- MS, milder  Daughter, April- MS  Other kids- healthy  6 grandchildren    Social Hx:   Ms. Preciado lives at [facility].  She keeps herself busy with many activities in the day, exercises (\"light and lively\"), crafts, coloring, baking, light bowling. She has many friends in their 90's there and she enjoys their company. She is seeing her family about 1-2 times a month now.     Has 5 children, all now living in [facility].  6 grandchildren.  Originally from [facility].     Current Medications  Current Outpatient Medications   Medication Sig Dispense Refill     acetaminophen (TYLENOL) 500 MG tablet Take 1-2 tablets by mouth every 6 hours as needed. Tylenol Extra Strength.        allopurinol (ZYLOPRIM) 100 MG tablet Take 2 tablets by mouth daily. 180 tablet 1     ASPIRIN PO Take 81 mg by mouth daily       atorvastatin (LIPITOR) 80 MG tablet Take 1 tablet by mouth daily. Pt needs to have labs done prior to further " refills. 90 tablet 0     Calcium Citrate-Vitamin D (CALCIUM CITRATE + D PO) 600/400 mg/unit take 1 tabs daily twice daily.        carboxymethylcellulose (REFRESH PLUS) 0.5 % SOLN 1 drop 3 times daily as needed.       CHLORTHALIDONE PO Take 25 mg by mouth daily       clobetasol (TEMOVATE) 0.05 % external solution Apply topically 2 times daily To itchy and scaly areas of scalp as needed. 60 mL 3     dorzolamide-timolol (COSOPT) 2-0.5 % ophthalmic solution Place 1 drop into both eyes 2 times daily       DULoxetine HCl (CYMBALTA PO) Take 60 mg by mouth daily       Elastic Bandages & Supports (JOBST KNEE HIGH COMPRESSION SM) MISC JOBST 20-30MMHG COMPRESSION SM MISC   To both legs during waking hours daily for leg swelling and venous stasis dermatitis. Do not sleep in stockings. 2 each 3     ferrous sulfate (IRON) 325 (65 FE) MG tablet Take 325 mg by mouth daily (with breakfast)       folic acid (FOLVITE) 1 MG tablet Take 1 mg by mouth daily.       gabapentin (NEURONTIN) 600 MG tablet Take 600 mg in AM , 600 mg afternoon and 900 mg at HS. 90 tablet 1     ibuprofen (ADVIL,MOTRIN) 400 MG tablet Take 400 mg by mouth every 6 hours as needed.       insulin aspart (NOVOLOG PEN) 100 UNIT/ML injection Inject 76-96 Units Subcutaneous HOLD WHEN TAKING U500       insulin aspart (NOVOLOG VIAL) 100 UNITS/ML injection Will discontinue once starting U500       insulin pen needle (B-D U/F) 31G X 5 MM Use 5 time(s) per day.  Please dispense as BD Pen Needle Mini U/F 31G x 5  each 11     insulin reg HIGH CONC (HUMULIN R U-500 KWIKPEN) 500 UNIT/ML PEN soln Inject 60 units at breakfast,60 units at lunch and 60 units at dinner. 20 mL 11     ketoconazole (NIZORAL) 2 % cream Apply topically 2 times daily To affected right ear mixed with triamcinolone ointment until return to clinic. 60 g 5     ketoconazole (NIZORAL) 2 % shampoo To entire wet scalp and ears and then wash off after 5 minutes three times a week. 240 mL 11     latanoprost  (XALATAN) 0.005 % ophthalmic solution 1 drop At Bedtime. Left eye       liraglutide (VICTOZA) 18 MG/3ML SOLN Inject 1.8 mg Subcutaneous daily. Start with 0.6 mg x 5 days, then increase to 1.2 mg x 5 days, then 1.8 mg thereafter.  Do not advance to higher dose if you have nausea. 3 Month 3     lisinopril (PRINIVIL,ZESTRIL) 5 MG tablet Take 2 tablets by mouth daily. Take one tab daily/Hold for SBP < 110 (Patient taking differently: Take 40 mg by mouth daily Take one tab daily/Hold for SBP < 110) 90 tablet 3     loratadine (CLARITIN) 10 MG tablet Take 10 mg by mouth as needed.       metFORMIN (GLUCOPHAGE-XR) 500 MG 24 hr tablet Take 2 tablets (1,000 mg) by mouth daily (with dinner) 180 tablet 3     metoprolol (LOPRESSOR) 10 mg/mL SUSP Take 100 mg by mouth 2 times daily       mometasone (ELOCON) 0.1 % cream Apply sparingly to left medial ankle area daily.  Do not apply to face. 45 g 0     nitroGLYCERIN (NITROSTAT) 0.4 MG SL tablet Place 0.4 mg under the tongue every 5 minutes as needed.       olopatadine HCl (PATADAY) 0.2 % SOLN Place 1 drop into both eyes daily as needed For itchy eyes 1 Bottle 5     Podiatric Products (AMLACTIN FOOT CREAM THERAPY EX) Externally apply 12 % topically       Pregabalin (LYRICA PO)        ranitidine (ZANTAC) 150 MG tablet Take 150 mg by mouth 2 times daily       sodium chloride (SODIUM CHLORIDE) 0.65 % nasal spray Spray 1 spray in nostril daily as needed.       traMADol (ULTRAM) 50 MG tablet        triamcinolone (KENALOG) 0.1 % ointment Apply topically daily To legs under compression stockings for stasis dermatitis discoloration. 60 g 5       Physical Exam   /72   Pulse 73   GENERAL: VSS.  Answering questions appropriately, appears stated age. Sitting in wheelchair.   EXTREMITIES: Legs with TYREL hose. No pedal edema.  Hands cold to the touch.  Normal capillary refill.  Normal color and pulses.     RESULTS  Lab Results   Component Value Date    A1C 7.5 (H) 10/24/2016    A1C 7.5 (H)  10/12/2015    A1C 8.1 (H) 03/06/2014    A1C 7.2 (H) 03/07/2013    A1C 7.7 (A) 02/13/2013    HEMOGLOBINA1 6.5 (A) 05/24/2019    HEMOGLOBINA1 7.1 (A) 10/10/2018    HEMOGLOBINA1 7.1 (A) 03/16/2018    HEMOGLOBINA1 7.3 (A) 06/05/2017    HEMOGLOBINA1 7.3 (A) 06/05/2017       TSH   Date Value Ref Range Status   10/24/2016 1.20 0.40 - 4.00 mU/L Final   10/12/2015 1.18 0.40 - 4.00 mU/L Final   08/21/2014 1.24 0.40 - 4.00 mU/L Final     Comment:     Effective 7/30/2014, the reference range for this assay has changed to reflect   new instrumentation/methodology.     08/05/2013 1.12 0.4 - 5.0 mU/L Final   07/15/2010 0.53 0.4 - 5.0 mU/L Final     T4 Total   Date Value Ref Range Status   10/30/2008 10.6 5.0 - 11.0 ug/dL Final     T4 Free   Date Value Ref Range Status   04/21/2006 1.04 0.70 - 1.85 ng/dL Final   09/23/2005 1.58 0.70 - 1.85 ng/dL Final       ALT   Date Value Ref Range Status   10/12/2015 39 0 - 50 U/L Final   03/06/2014 36 0 - 50 U/L Final   ]    Recent Labs   Lab Test 03/19/18 10/24/16  1301 10/12/15  1626 08/21/14  0935  05/10/12  1022   CHOL 109  --   --  110  --  144   HDL  --   --   --  43*  --  46*   LDL  --  48 51 43   < > 73   TRIG  --   --   --  122  --  123   CHOLHDLRATIO  --   --   --  2.6  --  3.1    < > = values in this interval not displayed.       Lab Results   Component Value Date     10/24/2016      Lab Results   Component Value Date    POTASSIUM 4.5 03/19/2018     Lab Results   Component Value Date    CHLORIDE 107 10/24/2016     Lab Results   Component Value Date    OLAF 9.6 10/24/2016     Lab Results   Component Value Date    CO2 26 10/24/2016     Lab Results   Component Value Date    BUN 16 10/24/2016     Lab Results   Component Value Date    CR 1.10 03/19/2018       GFR Estimate   Date Value Ref Range Status   03/19/2018 60 >50 ml/min/1.73m2 Final   10/24/2016 56 (L) >60 mL/min/1.7m2 Final     Comment:     Non  GFR Calc   10/12/2015 65 >60 mL/min/1.7m2 Final     Comment:      Non  GFR Calc     GFR Estimate If Black   Date Value Ref Range Status   03/19/2018 50 >50 ml/min/1.73m2 Final   10/24/2016 68 >60 mL/min/1.7m2 Final     Comment:      GFR Calc   10/12/2015 78 >60 mL/min/1.7m2 Final     Comment:      GFR Calc       Lab Results   Component Value Date    MICROL <5 10/12/2015     No results found for: MICROALBUMIN  No results found for: CPEPT, GADAB, ISCAB    Vitamin B12   Date Value Ref Range Status   08/05/2013 506 >210 pg/mL Final     Comment:     Interp: [id number] = Normal   ]    Most recent eye exam date: : Not Found     Assessment/Plan:      1.  Type 2 diabetes-  Doing well on U-500 and insulin requirements have come down significantly since the addition of Metformin and her weight loss.  With her afternoon readings in the 70's, will reduce U-500 to 50 units at breakfast, 50 units at lunch and continue 60 units at dinner.  Asked facility to fax blood sugars every 2 weeks, sooner with concerns or hypoglycemia.  May consider transition to Tresiba U200 to minimize injections if glucose continues to improve.  A1c is down to 6.5% today.     2.  Risk factors- BP is well controlled.  Will check for microalbuminuria.   On ACE inhibitor.  Creatinine ok.  LDL <100 on statin, but this has not been checked in a couple years. Follows with podiatry.  On gabapentin and cymbalta for neuropathy.  She is losing weight.  Cold hands- She has been told to do exercises with her hands, but per RN at care facility, she has not been performing these regularly. Encouraged increasing her hand strengthening exercises.  She does have strong pulses.      3. Deconditioning- I called and discussed this with Rosalva RN at her care facility, and she says Cristal did not comply with therapy in the past and that she had been encouraged to wheel herself with her hands and gain more strength.  I asked her to voice Cristal's concern (wanting to use a walker) to her PCP at the  nursing home to see if she could try again with physical therapy.      4.  F/U in 3 months with Dr. Herrera, in 6 months with me.       I spent 30 minutes with this patient face to face and explained the conditions and plans (more than 50% of time was counseling/coordination of care, diabetes management, follow up plan for worsening hyper and hypoglycemia). The patient understood and is satisfied with today's visit.     Renetta Washington PA-C, MPAS   [facility]  Department of Medicine  Division of Endocrinology and Diabetes

## 2019-05-24 NOTE — NURSING NOTE
Chief Complaint   Patient presents with     RECHECK     Type 2 Diabetes     Capillary puncture performed for Hemoglobin A1C test. Patient tolerated well.    Lesli Fraser MA

## 2019-05-24 NOTE — LETTER
5/24/2019       RE: Cristal Preciado  [facility]  [address]  [place] [address]     Dear Colleague,    Thank you for referring your patient, Cristal Preciado, to the [facility] ENDOCRINOLOGY at [facility]. Please see a copy of my visit note below.    HPI   Ms. Preciado returns for follow up of long standing type 2 diabetes.  She is currently being managed on U-500 insulin (added in 2017) and Metformin 1000 mg daily (added in 2018), Victoza 1.8 mg daily (several years).  She likes this much better than her previous regimen.  Her insulin requirements have slowly decreased since the addition of Metformin.  She has also been losing weight (about 10 pounds in the past few months).  Her appetite is less.   She is currently taking U-500 60 units TID.  Her caretakers at [facility] have been testing her blood sugars 4 times a day. She has had no hypoglycemia, but reports that she occasionally has readings in the 90's.  AM readings are the highest- around 154-216, lunch readings are 158-239 mg/dL,  Dinner- , HS-  mg/dL.  She always has an HS snack to prevent overnight hypoglycemia.   She is also on liraglutide 1.8 mg daily.  She has snacks in her room- usually fig bars and Oreos.      She has been frustrated with being wheelchair bound.  She has been told by the therapists that she will never walk again, however she feels she could walk if she slowly gains her strength.  She is complaining of severe pain in her hands when they are cold.  The air conditioning worsens it.  She wears gloves constantly.  She did not have relief after carpal tunnel surgery.  She is taking gabapentin for neuropathy.  She is also on Cymbalta.  Neuropathy in feet better.  She is seeing Dr. Grant for her feet. She has ongoing lower back pain.  She has been losing weight and she is not sure why.  She has not been eating as much.   She otherwise has been feeling well  "and in her usual state of health. She has no other concerns today.     ROS   GENERAL: Reports weight is 217# (measured at nursing home).  She continues losing weight, but she is not trying to.  No fevers, chills,  night sweats.  HEENT: no dysphagia, diplopia, neck pain or tenderness, dry/scratchy eyes, URI, cough, sinus drainage, tinnitus, sinus pressure.   CV: No CP, SOB.  No palpitations, skipped beats, LOC.  LUNGS: no cough, sputum production, wheezing   ABDOMEN: no diarrhea, constipation, abdominal pain  EXTREMITIES:  She has no more pedal edema.  Hands still swollen, but having less swelling in her ankles and feet.  Has thickened toenails, not cutting into skin.  No pain. She sees Dr. Grant.  Dry skin.  Leg ulcerations healed.  Callus on left heel.    NEUROLOGY: no changes in vision.  Continued tingling and numbness in hands and feet.   MSK: weakness in legs after stroke.  Needs help getting out of wheelchair. Does not use walker any more due to weakness.      PMH   Type 2 diabetes  Neuropathy   Nephropathy  Stroke - uses a walker, but mainly in wheelchair.   CAD, s/p stent   HTN  Dyslipidemia  Cataracts  Glaucoma  GERD    Family Hx:   Mom- stroke  Dad- cancer  1 of 12 children  2 brothers and 2 sisters-  cancer- unsure of type  1 sister with diabetes, on insulin  5 children  Son- MS, milder  Daughter, April- MS  Other kids- healthy  6 grandchildren    Social Hx:   Ms. Preciado lives at [facility].  She keeps herself busy with many activities in the day, exercises (\"light and lively\"), crafts, coloring, baking, light bowling. She has many friends in their 90's there and she enjoys their company. She is seeing her family about 1-2 times a month now.     Has 5 children, all now living in [facility].  6 grandchildren.  Originally from [facility] [place] [facility].     Current Medications  Current Outpatient Medications   Medication Sig Dispense Refill     acetaminophen (TYLENOL) 500 MG tablet Take " 1-2 tablets by mouth every 6 hours as needed. Tylenol Extra Strength.        allopurinol (ZYLOPRIM) 100 MG tablet Take 2 tablets by mouth daily. 180 tablet 1     ASPIRIN PO Take 81 mg by mouth daily       atorvastatin (LIPITOR) 80 MG tablet Take 1 tablet by mouth daily. Pt needs to have labs done prior to further refills. 90 tablet 0     Calcium Citrate-Vitamin D (CALCIUM CITRATE + D PO) 600/400 mg/unit take 1 tabs daily twice daily.        carboxymethylcellulose (REFRESH PLUS) 0.5 % SOLN 1 drop 3 times daily as needed.       CHLORTHALIDONE PO Take 25 mg by mouth daily       clobetasol (TEMOVATE) 0.05 % external solution Apply topically 2 times daily To itchy and scaly areas of scalp as needed. 60 mL 3     dorzolamide-timolol (COSOPT) 2-0.5 % ophthalmic solution Place 1 drop into both eyes 2 times daily       DULoxetine HCl (CYMBALTA PO) Take 60 mg by mouth daily       Elastic Bandages & Supports (JOBST KNEE HIGH COMPRESSION SM) MISC JOBST 20-30MMHG COMPRESSION SM MISC   To both legs during waking hours daily for leg swelling and venous stasis dermatitis. Do not sleep in stockings. 2 each 3     ferrous sulfate (IRON) 325 (65 FE) MG tablet Take 325 mg by mouth daily (with breakfast)       folic acid (FOLVITE) 1 MG tablet Take 1 mg by mouth daily.       gabapentin (NEURONTIN) 600 MG tablet Take 600 mg in AM , 600 mg afternoon and 900 mg at HS. 90 tablet 1     ibuprofen (ADVIL,MOTRIN) 400 MG tablet Take 400 mg by mouth every 6 hours as needed.       insulin aspart (NOVOLOG PEN) 100 UNIT/ML injection Inject 76-96 Units Subcutaneous HOLD WHEN TAKING U500       insulin aspart (NOVOLOG VIAL) 100 UNITS/ML injection Will discontinue once starting U500       insulin pen needle (B-D U/F) 31G X 5 MM Use 5 time(s) per day.  Please dispense as BD Pen Needle Mini U/F 31G x 5  each 11     insulin reg HIGH CONC (HUMULIN R U-500 KWIKPEN) 500 UNIT/ML PEN soln Inject 60 units at breakfast,60 units at lunch and 60 units at dinner.  20 mL 11     ketoconazole (NIZORAL) 2 % cream Apply topically 2 times daily To affected right ear mixed with triamcinolone ointment until return to clinic. 60 g 5     ketoconazole (NIZORAL) 2 % shampoo To entire wet scalp and ears and then wash off after 5 minutes three times a week. 240 mL 11     latanoprost (XALATAN) 0.005 % ophthalmic solution 1 drop At Bedtime. Left eye       liraglutide (VICTOZA) 18 MG/3ML SOLN Inject 1.8 mg Subcutaneous daily. Start with 0.6 mg x 5 days, then increase to 1.2 mg x 5 days, then 1.8 mg thereafter.  Do not advance to higher dose if you have nausea. 3 Month 3     lisinopril (PRINIVIL,ZESTRIL) 5 MG tablet Take 2 tablets by mouth daily. Take one tab daily/Hold for SBP < 110 (Patient taking differently: Take 40 mg by mouth daily Take one tab daily/Hold for SBP < 110) 90 tablet 3     loratadine (CLARITIN) 10 MG tablet Take 10 mg by mouth as needed.       metFORMIN (GLUCOPHAGE-XR) 500 MG 24 hr tablet Take 2 tablets (1,000 mg) by mouth daily (with dinner) 180 tablet 3     metoprolol (LOPRESSOR) 10 mg/mL SUSP Take 100 mg by mouth 2 times daily       mometasone (ELOCON) 0.1 % cream Apply sparingly to left medial ankle area daily.  Do not apply to face. 45 g 0     nitroGLYCERIN (NITROSTAT) 0.4 MG SL tablet Place 0.4 mg under the tongue every 5 minutes as needed.       olopatadine HCl (PATADAY) 0.2 % SOLN Place 1 drop into both eyes daily as needed For itchy eyes 1 Bottle 5     Podiatric Products (AMLACTIN FOOT CREAM THERAPY EX) Externally apply 12 % topically       Pregabalin (LYRICA PO)        ranitidine (ZANTAC) 150 MG tablet Take 150 mg by mouth 2 times daily       sodium chloride (SODIUM CHLORIDE) 0.65 % nasal spray Spray 1 spray in nostril daily as needed.       traMADol (ULTRAM) 50 MG tablet        triamcinolone (KENALOG) 0.1 % ointment Apply topically daily To legs under compression stockings for stasis dermatitis discoloration. 60 g 5       Physical Exam   /72   Pulse 73    GENERAL: VSS.  Answering questions appropriately, appears stated age. Sitting in wheelchair.   EXTREMITIES: Legs with TYREL hose. No pedal edema.  Hands cold to the touch.  Normal capillary refill.  Normal color and pulses.     RESULTS  Lab Results   Component Value Date    A1C 7.5 (H) 10/24/2016    A1C 7.5 (H) 10/12/2015    A1C 8.1 (H) 03/06/2014    A1C 7.2 (H) 03/07/2013    A1C 7.7 (A) 02/13/2013    HEMOGLOBINA1 6.5 (A) 05/24/2019    HEMOGLOBINA1 7.1 (A) 10/10/2018    HEMOGLOBINA1 7.1 (A) 03/16/2018    HEMOGLOBINA1 7.3 (A) 06/05/2017    HEMOGLOBINA1 7.3 (A) 06/05/2017       TSH   Date Value Ref Range Status   10/24/2016 1.20 0.40 - 4.00 mU/L Final   10/12/2015 1.18 0.40 - 4.00 mU/L Final   08/21/2014 1.24 0.40 - 4.00 mU/L Final     Comment:     Effective 7/30/2014, the reference range for this assay has changed to reflect   new instrumentation/methodology.     08/05/2013 1.12 0.4 - 5.0 mU/L Final   07/15/2010 0.53 0.4 - 5.0 mU/L Final     T4 Total   Date Value Ref Range Status   10/30/2008 10.6 5.0 - 11.0 ug/dL Final     T4 Free   Date Value Ref Range Status   04/21/2006 1.04 0.70 - 1.85 ng/dL Final   09/23/2005 1.58 0.70 - 1.85 ng/dL Final       ALT   Date Value Ref Range Status   10/12/2015 39 0 - 50 U/L Final   03/06/2014 36 0 - 50 U/L Final   ]    Recent Labs   Lab Test 03/19/18 10/24/16  1301 10/12/15  1626 08/21/14  0935  05/10/12  1022   CHOL 109  --   --  110  --  144   HDL  --   --   --  43*  --  46*   LDL  --  48 51 43   < > 73   TRIG  --   --   --  122  --  123   CHOLHDLRATIO  --   --   --  2.6  --  3.1    < > = values in this interval not displayed.       Lab Results   Component Value Date     10/24/2016      Lab Results   Component Value Date    POTASSIUM 4.5 03/19/2018     Lab Results   Component Value Date    CHLORIDE 107 10/24/2016     Lab Results   Component Value Date    OLAF 9.6 10/24/2016     Lab Results   Component Value Date    CO2 26 10/24/2016     Lab Results   Component Value Date     BUN 16 10/24/2016     Lab Results   Component Value Date    CR 1.10 03/19/2018       GFR Estimate   Date Value Ref Range Status   03/19/2018 60 >50 ml/min/1.73m2 Final   10/24/2016 56 (L) >60 mL/min/1.7m2 Final     Comment:     Non  GFR Calc   10/12/2015 65 >60 mL/min/1.7m2 Final     Comment:     Non  GFR Calc     GFR Estimate If Black   Date Value Ref Range Status   03/19/2018 50 >50 ml/min/1.73m2 Final   10/24/2016 68 >60 mL/min/1.7m2 Final     Comment:      GFR Calc   10/12/2015 78 >60 mL/min/1.7m2 Final     Comment:      GFR Calc       Lab Results   Component Value Date    MICROL <5 10/12/2015     No results found for: MICROALBUMIN  No results found for: CPEPT, GADAB, ISCAB    Vitamin B12   Date Value Ref Range Status   08/05/2013 506 >210 pg/mL Final     Comment:     Interp: [id number] = Normal   ]    Most recent eye exam date: : Not Found     Assessment/Plan:      1.  Type 2 diabetes-  Doing well on U-500 and insulin requirements have come down significantly since the addition of Metformin and her weight loss.  With her afternoon readings in the 70's, will reduce U-500 to 50 units at breakfast, 50 units at lunch and continue 60 units at dinner.  Asked facility to fax blood sugars every 2 weeks, sooner with concerns or hypoglycemia.  May consider transition to Tresiba U200 to minimize injections if glucose continues to improve.  A1c is down to 6.5% today.     2.  Risk factors- BP is well controlled.  Will check for microalbuminuria.   On ACE inhibitor.  Creatinine ok.  LDL <100 on statin, but this has not been checked in a couple years. Follows with podiatry.  On gabapentin and cymbalta for neuropathy.  She is losing weight.  Cold hands- She has been told to do exercises with her hands, but per RN at care facility, she has not been performing these regularly. Encouraged increasing her hand strengthening exercises.  She does have strong pulses.      3.  Deconditioning- I called and discussed this with Rosalva RN at her care facility, and she says Cristal did not comply with therapy in the past and that she had been encouraged to wheel herself with her hands and gain more strength.  I asked her to voice Cristal's concern (wanting to use a walker) to her PCP at the nursing home to see if she could try again with physical therapy.      4.  F/U in 3 months with Dr. Herrera, in 6 months with me.       I spent 30 minutes with this patient face to face and explained the conditions and plans (more than 50% of time was counseling/coordination of care, diabetes management, follow up plan for worsening hyper and hypoglycemia). The patient understood and is satisfied with today's visit.     Renetta Washington PA-C, MPAS   [facility]  Department of Medicine  Division of Endocrinology and Diabetes

## 2019-05-28 ENCOUNTER — RECORDS - HEALTHEAST (OUTPATIENT)
Dept: LAB | Facility: CLINIC | Age: 69
End: 2019-05-28

## 2019-05-29 ENCOUNTER — RECORDS - HEALTHEAST (OUTPATIENT)
Dept: LAB | Facility: CLINIC | Age: 69
End: 2019-05-29

## 2019-05-29 LAB
CHOLEST SERPL-MCNC: 110 MG/DL
CREAT SERPL-MCNC: 1.14 MG/DL (ref 0.6–1.1)
CREAT UR-MCNC: 34 MG/DL
GFR SERPL CREATININE-BSD FRML MDRD: 48 ML/MIN/1.73M2
MICROALBUMIN UR-MCNC: 5.04 MG/DL (ref 0–1.99)
MICROALBUMIN/CREAT UR: 148.2 MG/G

## 2019-05-31 ENCOUNTER — TELEPHONE (OUTPATIENT)
Dept: OBGYN | Facility: CLINIC | Age: 69
End: 2019-05-31

## 2019-05-31 NOTE — TELEPHONE ENCOUNTER
Received call from Rosalva, nurse for Cristal returning call from Dr. Kerns re: biopsy results for Cristal.    Discussed with Rosalva, will route to Dr. Kerns to return call to discuss results. Also request a faxed copy of results to care team at [facility] at  [phone].

## 2019-06-03 ENCOUNTER — OFFICE VISIT (OUTPATIENT)
Dept: OBGYN | Facility: CLINIC | Age: 69
End: 2019-06-03
Attending: OBSTETRICS & GYNECOLOGY
Payer: COMMERCIAL

## 2019-06-03 VITALS
HEART RATE: 67 BPM | WEIGHT: 230 LBS | DIASTOLIC BLOOD PRESSURE: 80 MMHG | BODY MASS INDEX: 40.74 KG/M2 | SYSTOLIC BLOOD PRESSURE: 154 MMHG

## 2019-06-03 DIAGNOSIS — N85.02 ENDOMETRIAL HYPERPLASIA WITH ATYPIA: Primary | ICD-10-CM

## 2019-06-03 PROCEDURE — G0463 HOSPITAL OUTPT CLINIC VISIT: HCPCS | Mod: ZF

## 2019-06-03 NOTE — NURSING NOTE
Chief Complaint   Patient presents with     Follow Up     Follow up for EMB result       See ALEJANDRA Fischer 6/3/2019

## 2019-06-03 NOTE — LETTER
6/3/2019       RE: Cristal Preciado  [facility]  [address]  [place] [address]     Dear Colleague,    Thank you for referring your patient, Cristal Preciado, to the [facility] at [facility]. Please see a copy of my visit note below.    Path follow up today.  Labs c/w atypical endometrial hyperplasia. Discussed this finding and f/u recommended to meet with GYN/ONC. Given her medical comorbidities, may recommend medical mgmt, but will defer to ONC team.   Referral placed and asked Desk staff to help facilitate GYN ONC referral.     Questions answered for patient and what next steps will include call from GYN ONC to schedule appt.    Claribel Kerns MD, FACOG  [facility]  OB/GYN    6/4/2019  3:46 PM    TT spent 10 min, all counseling and coordinating care for hyperplasia.

## 2019-06-04 NOTE — PROGRESS NOTES
Path follow up today.  Labs c/w atypical endometrial hyperplasia. Discussed this finding and f/u recommended to meet with GYN/ONC. Given her medical comorbidities, may recommend medical mgmt, but will defer to ONC team.   Referral placed and asked Desk staff to help facilitate GYN ONC referral.     Questions answered for patient and what next steps will include call from GYN ONC to schedule appt.    Claribel Kerns MD, FACOG  [facility]  OB/GYN    6/4/2019  3:46 PM    TT spent 10 min, all counseling and coordinating care for hyperplasia.

## 2019-06-05 NOTE — TELEPHONE ENCOUNTER
ONCOLOGY INTAKE: Records Information      APPT INFORMATION:  Referring provider:  Dr. Claribel Kerns  Referring provider s clinic:  [facility]  Reason for visit/diagnosis:  Hyperplasia  Has patient been notified of appointment date and time?: yes    RECORDS INFORMATION:  Were the records received with the referral (via [employer])? no    Has patient been seen for any external appt for this diagnosis? no    If yes, where? n/a    Has patient had any imaging or procedures outside of [facility] for this condition? no      If Yes, where? n/a    ADDITIONAL INFORMATION:

## 2019-06-10 ENCOUNTER — TELEPHONE (OUTPATIENT)
Dept: ENDOCRINOLOGY | Facility: CLINIC | Age: 69
End: 2019-06-10

## 2019-06-10 NOTE — TELEPHONE ENCOUNTER
Cristal gets her U500 with  Meals  TID. Tonight she is gone for a  BBQUE with  Family and  won't be back until  7 PM . They will check  her  BS when she returns  and  they will give  give her a snack and  then the  Dinner time   U500. If she did not eat they will give her a full meal.  They are questioning  now that it is  Summer and she often goes out if Renetta wanted any standing orders  for how to administer that dinner dose . If out for dinner administer  the U500 when she returns after a BS check or something in that order so they don't have to call. Chelo Marcelo, RN on 6/10/2019 at 5:32 PM

## 2019-06-10 NOTE — TELEPHONE ENCOUNTER
Health Call Center    Phone Message    May a detailed message be left on voicemail: no    Reason for Call: Other: Pat with the [facility] is requesting a call back about one of the patient's medication. Pt will not be able to take her: insulin reg HIGH CONC (HUMULIN R U-500 KWIKPEN) 500 UNIT/ML PEN soln at the regular time tonight (6/10/19). Pat is wondering at what time she can give it to her so that the Pt doesn't miss a dose. Please Call Pat back at: [phone]  No VM available     Action Taken: Message routed to:  [facility] ([facility] Diabetes and Endo

## 2019-06-12 NOTE — TELEPHONE ENCOUNTER
Pat notified that U 500 is quite long acting so as long as she has eaten and they also  always give her a bedtime snack giving the U500  at whatever time she returns in the evening  is appropriate. They just need to let us know if theres any hypoglycemia. Chelo Marcelo RN on 6/12/2019 at 12:28 PM

## 2019-06-18 ENCOUNTER — ONCOLOGY VISIT (OUTPATIENT)
Dept: ONCOLOGY | Facility: CLINIC | Age: 69
End: 2019-06-18
Attending: OBSTETRICS & GYNECOLOGY
Payer: COMMERCIAL

## 2019-06-18 ENCOUNTER — PRE VISIT (OUTPATIENT)
Dept: ONCOLOGY | Facility: CLINIC | Age: 69
End: 2019-06-18

## 2019-06-18 VITALS
TEMPERATURE: 97.7 F | DIASTOLIC BLOOD PRESSURE: 69 MMHG | SYSTOLIC BLOOD PRESSURE: 135 MMHG | OXYGEN SATURATION: 100 % | HEART RATE: 68 BPM | BODY MASS INDEX: 40.65 KG/M2 | WEIGHT: 229.5 LBS | RESPIRATION RATE: 18 BRPM

## 2019-06-18 DIAGNOSIS — N85.02 ENDOMETRIAL HYPERPLASIA WITH ATYPIA: ICD-10-CM

## 2019-06-18 PROCEDURE — G0463 HOSPITAL OUTPT CLINIC VISIT: HCPCS | Mod: ZF

## 2019-06-18 PROCEDURE — 99205 OFFICE O/P NEW HI 60 MIN: CPT | Mod: ZP | Performed by: OBSTETRICS & GYNECOLOGY

## 2019-06-18 RX ORDER — CEFAZOLIN SODIUM 2 G/50ML
2 SOLUTION INTRAVENOUS
Status: CANCELLED | OUTPATIENT
Start: 2019-06-18

## 2019-06-18 RX ORDER — ASPIRIN 81 MG
81 TABLET,CHEWABLE ORAL DAILY
Refills: 99 | Status: ON HOLD | COMMUNITY
Start: 2019-06-12 | End: 2023-01-01

## 2019-06-18 RX ORDER — CEFAZOLIN SODIUM 1 G/50ML
1 INJECTION, SOLUTION INTRAVENOUS SEE ADMIN INSTRUCTIONS
Status: CANCELLED | OUTPATIENT
Start: 2019-06-18

## 2019-06-18 ASSESSMENT — PAIN SCALES - GENERAL: PAINLEVEL: NO PAIN (0)

## 2019-06-18 NOTE — NURSING NOTE
"Oncology Rooming Note    June 18, 2019 2:59 PM   Cristal Preciado is a 69 year old female who presents for:    Chief Complaint   Patient presents with     Oncology Clinic Visit     NEW; Endometrial Hyperplasia     Initial Vitals: /69 (BP Location: Right arm, Patient Position: Sitting, Cuff Size: Adult Regular)   Pulse 68   Temp 97.7  F (36.5  C) (Oral)   Resp 18   Wt 104.1 kg (229 lb 8 oz)   SpO2 100%   BMI 40.65 kg/m   Estimated body mass index is 40.65 kg/m  as calculated from the following:    Height as of 2/27/17: 1.6 m (5' 3\").    Weight as of this encounter: 104.1 kg (229 lb 8 oz). Body surface area is 2.15 meters squared.  No Pain (0) Comment: Data Unavailable   No LMP recorded. Patient is postmenopausal.  Allergies reviewed: Yes  Medications reviewed: Yes    Medications: Medication refills not needed today.  Pharmacy name entered into [facility]: [id number] - [place], [place] - [address]    Clinical concerns: Extreme low back pain that wakes her at night. Dr Cardoso was notified.      Opal Nguyen CMA              "

## 2019-06-18 NOTE — NURSING NOTE
Pre Op Nurse Teaching Template    Relevant Diagnosis: Endometrial hyperplasia     Teaching Topic: Robotic assisted total laparoscopic hysterectomy,BSO       Person(s) involved in teaching :  Patient  & daughter  Motivation Level:  Asks Questions:    Yes      Eager to Learn:     Yes     Cooperative:          Yes    Receptive (willing. Able to accept information):    Yes      Patient and those who are listed above demonstrates understanding of the following:   Reason for the appointment, diagnosis and treatment plan:   Yes   Knowledge of proper use of medications and conditions for which they are ordered (with special attention to potential side effects or drug interactions): Yes   Which situations necessitate calling provider and whom to contact: Yes         Nutritional needs and diet plan:  Yes      Pain management techniques:     Yes, Pain Scale   Diet:   Yes, [facility] Diet Instructions    Teaching Concerns addressed: Yes    Infection Prevention:  Patient and those who are listed above demonstrate understanding of the following:  Pre-Op CHG Bathing Instructions: Yes  Surgical procedure site care taught:   Yes   Signs and symptoms of infection taught: Yes       Instructional Materials Used/Given:  [facility] Before You Surgery Booklet  Hysterectomy Guidelines  Pain Assessment Tool   Home Care after Major Abdominal or Vaginal Surgery  Map  Accommodations Brochure  Phone numbers for [facility] and [facility]  Copy of Surgical Consent    Comments:  SANDRA Harrison RN

## 2019-06-18 NOTE — LETTER
2019       RE: Cristal Preciado  [facility]  [address]  [place] [address]     Dear Colleague,    Thank you for referring your patient, Cristal Preciado, to the [facility]. Please see a copy of my visit note below.    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 She is here today with her daughter Shantelle and son Heber.    Reports recent blood noted by her nurse at her nursing home. Saw Dr Kerns who performed an office endometrial biopsy which showed EIN/CAH. Unsure if she has had bleeding since.   Notably, She has had type 2 diabetes since . She has complications with neuropathy, microalbuminuria, CAD status post stent , CVA. On ASA 81, but no other blood thinner. Most recent A1C 6.5.     She lives in a nursing home due to her CVA. Is in her wheelchair all day. Requires a lift for transfers and can stand for only a few seconds with assistance. Cannot sit up independently due to instability of her core/trunk. Is able to do exercises in her wheelchair. When I ask her about her quality of life she says she has a very good quality of life. She is outside most days in the summer and she enjoys spending time with her friends.           Review of Systems:  Systemic:No weight changes.    Skin : No skin changes or new lesions.   Eye : No changes in vision.   Pulmonary: No cough or SOB.   Cardiovascular: No CP or palpitations  Gastrointestinal : No diarrhea, constipation, abdominal pain. Bowel habits normal.   Genitourinary: No dysuria, urgency or bleeding  Psychiatric: No depression or anxiety  Hematologic : No palpable  lymph nodes.   Endocrine : No hot flashes. No heat/cold intolerance.    +DM.   Neurological: No headaches, no numbness.     Past Medical History:   Diagnosis Date     AION (anterior ischaemic optic neuropathy), left eye     NAION LE     CAD (coronary artery disease) 3/2009    [facility]; Angio  UM- normal coronary arteries     Cataract      CVA (cerebral vascular accident) (H)     admitted at [facility]     on Cymbalta     Diabetes mellitus, type 2 (H)      Diabetic nephropathy (H)      Diabetic neuropathy (H)     severe     Diabetic retinopathy (H)      GERD (gastroesophageal reflux disease)      Hyperlipidemia      Hypertension     ECHO 2013, TDS, NL EF     POAG (primary open-angle glaucoma)     adv BE     Seasonal allergies      Tubular adenoma of colon     repeat colonoscopy in        Past Surgical History:   Procedure Laterality Date      SECTION       COLONOSCOPY  7/15/2013    Tubular adenoma; repeat in ;Procedure: COMBINED COLONOSCOPY, SINGLE BIOPSY/POLYPECTOMY BY BIOPSY;;  Surgeon: Don King MD;  Tubular adenoma     EXTRACAPSULAR CATARACT EXTRATION WITH INTRAOCULAR LENS IMPLANT  11-10-09, 2-9-10    11-10-09 Lt, 2-9-10 Rt; Left eye 2012        OBGYN history and Health Maintenance:  P5. One C/S for twins  Last Pap Smear: , NILM.No abnormals  Last Mammogram:  Last Colonoscopy:     Current Outpatient Medications   Medication Sig Dispense Refill     acetaminophen (TYLENOL) 500 MG tablet Take 1-2 tablets by mouth every 6 hours as needed. Tylenol Extra Strength.        ASPIRIN LOW DOSE 81 MG chewable tablet CHEW AND SWALLOW 1 TAB BY MOUTH ONCE DAILY  99     atorvastatin (LIPITOR) 80 MG tablet Take 1 tablet by mouth daily. Pt needs to have labs done prior to further refills. 90 tablet 0     calcium carbonate 600 mg-vitamin D 400 units (CALTRATE) 600-400 MG-UNIT per tablet        dorzolamide-timolol (COSOPT) 2-0.5 % ophthalmic solution Place 1  drop into both eyes 2 times daily       DULoxetine HCl (CYMBALTA PO) Take 60 mg by mouth daily       Elastic Bandages & Supports (JOBST KNEE HIGH COMPRESSION SM) MISC JOBST 20-30MMHG COMPRESSION SM MISC   To both legs during waking hours daily for leg swelling and venous stasis dermatitis. Do not sleep in stockings. 2 each 3     ferrous sulfate (IRON) 325 (65 FE) MG tablet Take 325 mg by mouth daily (with breakfast)       folic acid (FOLVITE) 1 MG tablet Take 1 mg by mouth daily.       gabapentin (NEURONTIN) 600 MG tablet Take 600 mg in AM , 600 mg afternoon and 900 mg at HS. 90 tablet 1     insulin pen needle (B-D U/F) 31G X 5 MM Use 5 time(s) per day.  Please dispense as BD Pen Needle Mini U/F 31G x 5  each 11     insulin reg HIGH CONC (HUMULIN R U-500 KWIKPEN) 500 UNIT/ML PEN soln Inject 50 units at breakfast, 50 units at lunch and 60 units at dinner. 20 mL 11     ketoconazole (NIZORAL) 2 % cream Apply topically 2 times daily To affected right ear mixed with triamcinolone ointment until return to clinic. 60 g 5     ketoconazole (NIZORAL) 2 % shampoo To entire wet scalp and ears and then wash off after 5 minutes three times a week. 240 mL 11     latanoprost (XALATAN) 0.005 % ophthalmic solution 1 drop At Bedtime. Left eye       lisinopril (PRINIVIL,ZESTRIL) 5 MG tablet Take 2 tablets by mouth daily. Take one tab daily/Hold for SBP < 110 (Patient taking differently: Take 40 mg by mouth daily Take one tab daily/Hold for SBP < 110) 90 tablet 3     loratadine (CLARITIN) 10 MG tablet Take 10 mg by mouth as needed.       metFORMIN (GLUCOPHAGE-XR) 500 MG 24 hr tablet Take 2 tablets (1,000 mg) by mouth daily (with dinner) 180 tablet 3     metoprolol (LOPRESSOR) 10 mg/mL SUSP Take 100 mg by mouth 2 times daily       nitroGLYCERIN (NITROSTAT) 0.4 MG SL tablet Place 0.4 mg under the tongue every 5 minutes as needed.       olopatadine HCl (PATADAY) 0.2 % SOLN Place 1 drop into both eyes daily as needed For itchy eyes 1  Bottle 5     Pregabalin (LYRICA PO)        ranitidine (ZANTAC) 150 MG tablet Take 150 mg by mouth 2 times daily       traMADol (ULTRAM) 50 MG tablet        triamcinolone (KENALOG) 0.1 % ointment Apply topically daily To legs under compression stockings for stasis dermatitis discoloration. 60 g 5     UNABLE TO FIND Place in ear(s) 3 times daily MEDICATION NAME: Mineral oil ear drops as needed.       allopurinol (ZYLOPRIM) 100 MG tablet Take 2 tablets by mouth daily. (Patient not taking: Reported on 6/18/2019) 180 tablet 1     ASPIRIN PO Take 81 mg by mouth daily       Calcium Citrate-Vitamin D (CALCIUM CITRATE + D PO) 600/400 mg/unit take 1 tabs daily twice daily.        carboxymethylcellulose (REFRESH PLUS) 0.5 % SOLN 1 drop 3 times daily as needed.       CHLORTHALIDONE PO Take 25 mg by mouth daily       clobetasol (TEMOVATE) 0.05 % external solution Apply topically 2 times daily To itchy and scaly areas of scalp as needed. (Patient not taking: Reported on 6/18/2019) 60 mL 3     ibuprofen (ADVIL,MOTRIN) 400 MG tablet Take 400 mg by mouth every 6 hours as needed.       insulin aspart (NOVOLOG PEN) 100 UNIT/ML injection Inject 76-96 Units Subcutaneous HOLD WHEN TAKING U500       insulin aspart (NOVOLOG VIAL) 100 UNITS/ML injection Will discontinue once starting U500       liraglutide (VICTOZA) 18 MG/3ML SOLN Inject 1.8 mg Subcutaneous daily. Start with 0.6 mg x 5 days, then increase to 1.2 mg x 5 days, then 1.8 mg thereafter.  Do not advance to higher dose if you have nausea. (Patient not taking: Reported on 6/18/2019) 3 Month 3     mometasone (ELOCON) 0.1 % cream Apply sparingly to left medial ankle area daily.  Do not apply to face. (Patient not taking: Reported on 6/18/2019) 45 g 0     Podiatric Products (AMLACTIN FOOT CREAM THERAPY EX) Externally apply 12 % topically       sodium chloride (SODIUM CHLORIDE) 0.65 % nasal spray Spray 1 spray in nostril daily as needed.              Allergies   Allergen Reactions      Dust Mites Other (See Comments)     Sneezing runny eyes and nose.     Food Allergy Formula Hives     Mountain Dew and Walnuts     Pollen Extract Other (See Comments)     Sneezing runny eyes and nose.        Social History:  Social History     Tobacco Use     Smoking status: Former Smoker     Types: Cigarettes     Start date: 1968     Last attempt to quit: 3/6/2013     Years since quittin.2     Smokeless tobacco: Never Used   Substance Use Topics     Alcohol use: Not Currently     Work: Retired  Ethnicity identification:   Preferred language: English  Lives at home with: [facility] at [facility]    Family History:   The patient's family history is notable for:  Family History   Problem Relation Age of Onset     Hypertension Mother      Cerebrovascular Disease Mother      Glaucoma Father      Cancer Father      Diabetes Sister      Glaucoma Sister      Cancer - colorectal Other      Cerebrovascular Disease Other      Skin Cancer No family hx of      Melanoma No family hx of          Physical Exam:     /69 (BP Location: Right arm, Patient Position: Sitting, Cuff Size: Adult Regular)   Pulse 68   Temp 97.7  F (36.5  C) (Oral)   Resp 18   Wt 104.1 kg (229 lb 8 oz)   SpO2 100%   BMI 40.65 kg/m     Body mass index is 40.65 kg/m .    General: Alert and oriented, no acute distress. Slow speech but answers questions appropriately and is AAO.   Psych: Mood stable. Somewhat flat affect, but expressed emotion when discussing her friends and things she enjoys  Cardiovascular: RRR, no murmors  Pulmonary: Lungs clear anteriorly. Normal respiratory effort  GI: MOderate diffuse distention. No masses. No hernia.   Lymph: No enlarge lymph nodes in neck or groin  : Normal external genitalia. Cervix palpated as smooth (not visualized, BME only) No pelvic masses.   Ext: Cannot lift legs on her own. Cannot sit on her own.       19:   A1C 6.5      Path 5/15/19:   Patient Name: DAWNA  ZENY ELDRIDGE   MR#: [id number]   Specimen #: [id number]   Collected: 5/15/2019   Received: 5/16/2019   Reported: 5/24/2019 21:27   Ordering Phy(s): CLEO YOUNG     For improved result formatting, select 'View Enhanced Report Format' under    Linked Documents section.     SPECIMEN(S):   Endometrial biopsy     FINAL DIAGNOSIS:   Endometrium, biopsy:   - Endometrial atypical hyperplasia/endometrial intraepithelial neoplasia   - Fragments suggestive of endometrial polyp     I have personally reviewed all specimens and/or slides, including the   listed special stains, and used them   with my medical judgement to determine or confirm the final diagnosis.     Electronically signed out by:     Jodi Hernández M.D., [facility]       Assessment:    Zeny Preciado is a 69 year old woman with a new diagnosis of atypical endometrial hyperplasia (CAH/EIN)      Plan:     1.)   CAH/EIN: Discussed her pathology findings and the natural history of CAH and endometrial cancer. Discussed that up to 50% of women with this diagnosis end up having carcinoma on their final pathology specimen.Discussed that most endometrial cancer is stage I and cured with surgery alone.Discussed hysterectomy as a definitive therapy for her CAH/EIN. Also reviewed non surgical options with her as well including systemic or local progestin therapy. Reviewed ~50% response to these therapies.   This is a challenging case as she is a higher risk surgical candidate given her immobility and history of CAH and CVA. She has risk of  perioperative MI, CVA and DVT/PE in addition to the standard risks of bleeding, infection, damage to surrounding organs.   However, she has a good qualify of life despite her immobility. She may live a long time given how well controlled her chronic medical conditions are. I am worried about the risk of progression on hormone therapy. She is challenging to sample in the clinic and would require regular D&Cs for IUD  placement/replacement. I feel that as long as she is cleared by the anesthesia team, she is a good candidate for hysterectomy. Would not plan surgical staging      -Plan Robotic TLH/BSO  -PAC appointment for preoperative assessment and clearance  -Plan to admit for 1-2 days, then return to her nursing home. Will need lift in the hospital  -Consider postop anti-coag (prophylactic lovenox) given DVT risk because of her immobility       2.) Genetic risk factors were assessed: Final pathology pending      A total of 70 minutes was spent with the patient, 60 minutes of which were spent in counseling the patient and/or treatment planning.    Linh Cardoso MD    Department of Ob/Gyn and Women's Health  Division of Gynecologic Oncology  [facility]  [phone]

## 2019-06-18 NOTE — PROGRESS NOTES
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 She is here today with her daughter Shantelle and son Heber.    Reports recent blood noted by her nurse at her nursing home. Saw Dr Kerns who performed an office endometrial biopsy which showed EIN/CAH. Unsure if she has had bleeding since.   Notably, She has had type 2 diabetes since . She has complications with neuropathy, microalbuminuria, CAD status post stent , CVA. On ASA 81, but no other blood thinner. Most recent A1C 6.5.     She lives in a nursing home due to her CVA. Is in her wheelchair all day. Requires a lift for transfers and can stand for only a few seconds with assistance. Cannot sit up independently due to instability of her core/trunk. Is able to do exercises in her wheelchair. When I ask her about her quality of life she says she has a very good quality of life. She is outside most days in the summer and she enjoys spending time with her friends.           Review of Systems:  Systemic:No weight changes.    Skin : No skin changes or new lesions.   Eye : No changes in vision.   Pulmonary: No cough or SOB.   Cardiovascular: No CP or palpitations  Gastrointestinal : No diarrhea, constipation, abdominal pain. Bowel habits normal.   Genitourinary: No dysuria, urgency or bleeding  Psychiatric: No depression or anxiety  Hematologic : No palpable lymph nodes.   Endocrine : No hot flashes. No heat/cold intolerance.    +DM.   Neurological: No headaches, no numbness.     Past Medical History:   Diagnosis Date     AION (anterior ischaemic optic neuropathy), left eye     NAION LE     CAD (coronary artery disease) 3/2009     [facility]; Angio  UM- normal coronary arteries     Cataract      CVA (cerebral vascular accident) (H)     admitted at [facility]     Depression     on Cymbalta     Diabetes mellitus, type 2 (H)      Diabetic nephropathy (H)      Diabetic neuropathy (H)     severe     Diabetic retinopathy (H)      GERD (gastroesophageal reflux disease)      Hyperlipidemia      Hypertension     ECHO 2013, TDS, NL EF     POAG (primary open-angle glaucoma)     adv BE     Seasonal allergies      Tubular adenoma of colon     repeat colonoscopy in        Past Surgical History:   Procedure Laterality Date      SECTION       COLONOSCOPY  7/15/2013    Tubular adenoma; repeat in 2018;Procedure: COMBINED COLONOSCOPY, SINGLE BIOPSY/POLYPECTOMY BY BIOPSY;;  Surgeon: Don King MD;  Tubular adenoma     EXTRACAPSULAR CATARACT EXTRATION WITH INTRAOCULAR LENS IMPLANT  11-10-09, 2-9-10    11-10-09 Lt, 2-9-10 Rt; Left eye 2012        OBGYN history and Health Maintenance:  P5. One C/S for twins  Last Pap Smear: , NILM.No abnormals  Last Mammogram:  Last Colonoscopy:     Current Outpatient Medications   Medication Sig Dispense Refill     acetaminophen (TYLENOL) 500 MG tablet Take 1-2 tablets by mouth every 6 hours as needed. Tylenol Extra Strength.        ASPIRIN LOW DOSE 81 MG chewable tablet CHEW AND SWALLOW 1 TAB BY MOUTH ONCE DAILY  99     atorvastatin (LIPITOR) 80 MG tablet Take 1 tablet by mouth daily. Pt needs to have labs done prior to further refills. 90 tablet 0     calcium carbonate 600 mg-vitamin D 400 units (CALTRATE) 600-400 MG-UNIT per tablet        dorzolamide-timolol (COSOPT) 2-0.5 % ophthalmic solution Place 1 drop into both eyes 2 times daily       DULoxetine HCl (CYMBALTA PO) Take 60 mg by mouth daily       Elastic Bandages & Supports (JOBST KNEE HIGH COMPRESSION SM) MISC JOBST 20-30MMHG COMPRESSION SM MISC   To both legs during waking hours daily for leg swelling and venous stasis  dermatitis. Do not sleep in stockings. 2 each 3     ferrous sulfate (IRON) 325 (65 FE) MG tablet Take 325 mg by mouth daily (with breakfast)       folic acid (FOLVITE) 1 MG tablet Take 1 mg by mouth daily.       gabapentin (NEURONTIN) 600 MG tablet Take 600 mg in AM , 600 mg afternoon and 900 mg at HS. 90 tablet 1     insulin pen needle (B-D U/F) 31G X 5 MM Use 5 time(s) per day.  Please dispense as BD Pen Needle Mini U/F 31G x 5  each 11     insulin reg HIGH CONC (HUMULIN R U-500 KWIKPEN) 500 UNIT/ML PEN soln Inject 50 units at breakfast, 50 units at lunch and 60 units at dinner. 20 mL 11     ketoconazole (NIZORAL) 2 % cream Apply topically 2 times daily To affected right ear mixed with triamcinolone ointment until return to clinic. 60 g 5     ketoconazole (NIZORAL) 2 % shampoo To entire wet scalp and ears and then wash off after 5 minutes three times a week. 240 mL 11     latanoprost (XALATAN) 0.005 % ophthalmic solution 1 drop At Bedtime. Left eye       lisinopril (PRINIVIL,ZESTRIL) 5 MG tablet Take 2 tablets by mouth daily. Take one tab daily/Hold for SBP < 110 (Patient taking differently: Take 40 mg by mouth daily Take one tab daily/Hold for SBP < 110) 90 tablet 3     loratadine (CLARITIN) 10 MG tablet Take 10 mg by mouth as needed.       metFORMIN (GLUCOPHAGE-XR) 500 MG 24 hr tablet Take 2 tablets (1,000 mg) by mouth daily (with dinner) 180 tablet 3     metoprolol (LOPRESSOR) 10 mg/mL SUSP Take 100 mg by mouth 2 times daily       nitroGLYCERIN (NITROSTAT) 0.4 MG SL tablet Place 0.4 mg under the tongue every 5 minutes as needed.       olopatadine HCl (PATADAY) 0.2 % SOLN Place 1 drop into both eyes daily as needed For itchy eyes 1 Bottle 5     Pregabalin (LYRICA PO)        ranitidine (ZANTAC) 150 MG tablet Take 150 mg by mouth 2 times daily       traMADol (ULTRAM) 50 MG tablet        triamcinolone (KENALOG) 0.1 % ointment Apply topically daily To legs under compression stockings for stasis dermatitis  discoloration. 60 g 5     UNABLE TO FIND Place in ear(s) 3 times daily MEDICATION NAME: Mineral oil ear drops as needed.       allopurinol (ZYLOPRIM) 100 MG tablet Take 2 tablets by mouth daily. (Patient not taking: Reported on 6/18/2019) 180 tablet 1     ASPIRIN PO Take 81 mg by mouth daily       Calcium Citrate-Vitamin D (CALCIUM CITRATE + D PO) 600/400 mg/unit take 1 tabs daily twice daily.        carboxymethylcellulose (REFRESH PLUS) 0.5 % SOLN 1 drop 3 times daily as needed.       CHLORTHALIDONE PO Take 25 mg by mouth daily       clobetasol (TEMOVATE) 0.05 % external solution Apply topically 2 times daily To itchy and scaly areas of scalp as needed. (Patient not taking: Reported on 6/18/2019) 60 mL 3     ibuprofen (ADVIL,MOTRIN) 400 MG tablet Take 400 mg by mouth every 6 hours as needed.       insulin aspart (NOVOLOG PEN) 100 UNIT/ML injection Inject 76-96 Units Subcutaneous HOLD WHEN TAKING U500       insulin aspart (NOVOLOG VIAL) 100 UNITS/ML injection Will discontinue once starting U500       liraglutide (VICTOZA) 18 MG/3ML SOLN Inject 1.8 mg Subcutaneous daily. Start with 0.6 mg x 5 days, then increase to 1.2 mg x 5 days, then 1.8 mg thereafter.  Do not advance to higher dose if you have nausea. (Patient not taking: Reported on 6/18/2019) 3 Month 3     mometasone (ELOCON) 0.1 % cream Apply sparingly to left medial ankle area daily.  Do not apply to face. (Patient not taking: Reported on 6/18/2019) 45 g 0     Podiatric Products (AMLACTIN FOOT CREAM THERAPY EX) Externally apply 12 % topically       sodium chloride (SODIUM CHLORIDE) 0.65 % nasal spray Spray 1 spray in nostril daily as needed.              Allergies   Allergen Reactions     Dust Mites Other (See Comments)     Sneezing runny eyes and nose.     Food Allergy Formula Hives     Mountain Dew and Walnuts     Pollen Extract Other (See Comments)     Sneezing runny eyes and nose.        Social History:  Social History     Tobacco Use     Smoking status:  Former Smoker     Types: Cigarettes     Start date: 1968     Last attempt to quit: 3/6/2013     Years since quittin.2     Smokeless tobacco: Never Used   Substance Use Topics     Alcohol use: Not Currently     Work: Retired  Ethnicity identification:   Preferred language: English  Lives at home with: Lives at [facility]    Family History:   The patient's family history is notable for:  Family History   Problem Relation Age of Onset     Hypertension Mother      Cerebrovascular Disease Mother      Glaucoma Father      Cancer Father      Diabetes Sister      Glaucoma Sister      Cancer - colorectal Other      Cerebrovascular Disease Other      Skin Cancer No family hx of      Melanoma No family hx of          Physical Exam:     /69 (BP Location: Right arm, Patient Position: Sitting, Cuff Size: Adult Regular)   Pulse 68   Temp 97.7  F (36.5  C) (Oral)   Resp 18   Wt 104.1 kg (229 lb 8 oz)   SpO2 100%   BMI 40.65 kg/m    Body mass index is 40.65 kg/m .    General: Alert and oriented, no acute distress. Slow speech but answers questions appropriately and is AAO.   Psych: Mood stable. Somewhat flat affect, but expressed emotion when discussing her friends and things she enjoys  Cardiovascular: RRR, no murmors  Pulmonary: Lungs clear anteriorly. Normal respiratory effort  GI: MOderate diffuse distention. No masses. No hernia.   Lymph: No enlarge lymph nodes in neck or groin  : Normal external genitalia. Cervix palpated as smooth (not visualized, BME only) No pelvic masses.   Ext: Cannot lift legs on her own. Cannot sit on her own.       19:   A1C 6.5      Path 5/15/19:   Patient Name: ZENY TONEY   MR#: [id number]   Specimen #: [id number]   Collected: 5/15/2019   Received: 2019   Reported: 2019 21:27   Ordering Phy(s): CLEO YOUNG     For improved result formatting, select 'View Enhanced Report Format' under    Linked Documents section.      SPECIMEN(S):   Endometrial biopsy     FINAL DIAGNOSIS:   Endometrium, biopsy:   - Endometrial atypical hyperplasia/endometrial intraepithelial neoplasia   - Fragments suggestive of endometrial polyp     I have personally reviewed all specimens and/or slides, including the   listed special stains, and used them   with my medical judgement to determine or confirm the final diagnosis.     Electronically signed out by:     Jodi Hernández M.D., [facility]       Assessment:    Cristal Preciado is a 69 year old woman with a new diagnosis of atypical endometrial hyperplasia (CAH/EIN)      Plan:     1.)   CAH/EIN: Discussed her pathology findings and the natural history of CAH and endometrial cancer. Discussed that up to 50% of women with this diagnosis end up having carcinoma on their final pathology specimen.Discussed that most endometrial cancer is stage I and cured with surgery alone.Discussed hysterectomy as a definitive therapy for her CAH/EIN. Also reviewed non surgical options with her as well including systemic or local progestin therapy. Reviewed ~50% response to these therapies.   This is a challenging case as she is a higher risk surgical candidate given her immobility and history of CAH and CVA. She has risk of  perioperative MI, CVA and DVT/PE in addition to the standard risks of bleeding, infection, damage to surrounding organs.   However, she has a good qualify of life despite her immobility. She may live a long time given how well controlled her chronic medical conditions are. I am worried about the risk of progression on hormone therapy. She is challenging to sample in the clinic and would require regular D&Cs for IUD placement/replacement. I feel that as long as she is cleared by the anesthesia team, she is a good candidate for hysterectomy. Would not plan surgical staging      -Plan Robotic TLH/BSO  -PAC appointment for preoperative assessment and clearance  -Plan to admit for 1-2 days, then return  to her nursing home. Will need lift in the hospital  -Consider postop anti-coag (prophylactic lovenox) given DVT risk because of her immobility       2.) Genetic risk factors were assessed: Final pathology pending      A total of 70 minutes was spent with the patient, 60 minutes of which were spent in counseling the patient and/or treatment planning.    Linh Cardoso MD    Department of Ob/Gyn and Women's Health  Division of Gynecologic Oncology  [facility]  [phone]

## 2019-06-19 ENCOUNTER — TELEPHONE (OUTPATIENT)
Dept: ONCOLOGY | Facility: CLINIC | Age: 69
End: 2019-06-19

## 2019-06-19 NOTE — TELEPHONE ENCOUNTER
Called patient to schedule procedure with Dr. Cardoso, there was no answer and I was unable to leave a message.     My direct line [phone].

## 2019-06-19 NOTE — TELEPHONE ENCOUNTER
Patient was scheduled on 08/05 at [facility] OR per Nadia-Op Worksheet.      RN will communicate with patient and provide surgery information     I spoke with patient and RN Omayra and confirmed surgery date/time.   PAC scheduled on 07/23

## 2019-07-05 NOTE — TELEPHONE ENCOUNTER
RECORDS RECEIVED FROM: Internal/Care Everywhere   DATE RECEIVED: 7-15   NOTES STATUS DETAILS   OFFICE NOTE from referring provider    Internal    OFFICE NOTE from other cardiologist    Care Everywhere    DISCHARGE SUMMARY from hospital    Care Everywhere 4-23-18   DISCHARGE REPORT from the ER   N/A    OPERATIVE REPORT    N/A    MEDICATION LIST   Internal    LABS     BMP   Care Everywhere 1-10-19   CBC   N/A    CMP   N/A    Lipids   N/A    TSH   Internal 2016   DIAGNOSTIC PROCEDURES     EKG   In process    Monitor Reports   N/A    IMAGING (DISC & REPORT)      Echo   Internal 2013   Stress Tests   Care Everywhere    Cath   Internal 2013   MRI/MRA   N/A    CT/CTA   N/A      Action    Action Taken 7-5: Requested 2 EKGs from [facility]  7-11: Received and sent to scanning

## 2019-07-10 ENCOUNTER — OFFICE VISIT - HEALTHEAST (OUTPATIENT)
Dept: GERIATRICS | Facility: CLINIC | Age: 69
End: 2019-07-10

## 2019-07-10 DIAGNOSIS — I10 ESSENTIAL HYPERTENSION: ICD-10-CM

## 2019-07-10 DIAGNOSIS — G89.29 OTHER CHRONIC PAIN: ICD-10-CM

## 2019-07-10 DIAGNOSIS — R53.81 DEBILITY: ICD-10-CM

## 2019-07-11 ENCOUNTER — OFFICE VISIT (OUTPATIENT)
Dept: ENDOCRINOLOGY | Facility: CLINIC | Age: 69
End: 2019-07-11
Payer: COMMERCIAL

## 2019-07-11 ENCOUNTER — MEDICAL CORRESPONDENCE (OUTPATIENT)
Dept: HEALTH INFORMATION MANAGEMENT | Facility: CLINIC | Age: 69
End: 2019-07-11

## 2019-07-11 DIAGNOSIS — B35.1 DERMATOPHYTOSIS OF NAIL: ICD-10-CM

## 2019-07-11 DIAGNOSIS — E11.49 TYPE II OR UNSPECIFIED TYPE DIABETES MELLITUS WITH NEUROLOGICAL MANIFESTATIONS, NOT STATED AS UNCONTROLLED(250.60) (H): Primary | ICD-10-CM

## 2019-07-11 DIAGNOSIS — E11.51 DIABETES MELLITUS WITH PERIPHERAL VASCULAR DISEASE (H): ICD-10-CM

## 2019-07-11 NOTE — LETTER
2019       RE: Cristal Preciado  [facility]  [address]  [place] [address]     Dear Colleague,    Thank you for referring your patient, Cristal Preciado, to the [facility] ENDOCRINOLOGY at [facility]. Please see a copy of my visit note below.    Past Medical History:   Diagnosis Date     AION (anterior ischaemic optic neuropathy), left eye     NAION LE     CAD (coronary artery disease) 3/2009    [facility]; Angio  UM- normal coronary arteries     Cataract      CVA (cerebral vascular accident) (H)     admitted at [facility]     Depression     on Cymbalta     Diabetes mellitus, type 2 (H)      Diabetic nephropathy (H)      Diabetic neuropathy (H)     severe     Diabetic retinopathy (H)      GERD (gastroesophageal reflux disease)      Hyperlipidemia      Hypertension     ECHO , TDS, NL EF     POAG (primary open-angle glaucoma)     adv BE     Seasonal allergies      Tubular adenoma of colon     repeat colonoscopy in      Patient Active Problem List   Diagnosis     Cataract     CAD (coronary artery disease)     Diabetes mellitus, type 2 (H)     Diabetic nephropathy (H)     Diabetic neuropathy (H)     Diabetic retinopathy (H)     GERD (gastroesophageal reflux disease)     Glaucoma     Hypertension     Hyperlipidemia     CVA (cerebral vascular accident) (H)     Morbid obesity (H)     Anemia     Seasonal allergies     Depression     Tubular adenoma of colon     Leg weakness     Dermatitis, seborrheic     Eczematous dermatitis     History of coronary artery disease     Venous stasis dermatitis of both lower extremities     Uncontrolled type 2 diabetes mellitus (H)     Chronic dermatitis of hands     Neoplasm of uncertain behavior of skin     Past Surgical History:   Procedure Laterality Date      SECTION       COLONOSCOPY  7/15/2013    Tubular adenoma; repeat in ;Procedure: COMBINED COLONOSCOPY, SINGLE  BIOPSY/POLYPECTOMY BY BIOPSY;;  Surgeon: Don King MD;  Tubular adenoma     EXTRACAPSULAR CATARACT EXTRATION WITH INTRAOCULAR LENS IMPLANT  11-10-09, 2-9-10    11-10-09 Lt, 2-9-10 Rt; Left eye 2012     Social History     Socioeconomic History     Marital status: Single     Spouse name: Not on file     Number of children: Not on file     Years of education: Not on file     Highest education level: Not on file   Occupational History     Not on file   Social Needs     Financial resource strain: Not on file     Food insecurity:     Worry: Not on file     Inability: Not on file     Transportation needs:     Medical: Not on file     Non-medical: Not on file   Tobacco Use     Smoking status: Former Smoker     Types: Cigarettes     Start date: 1968     Last attempt to quit: 3/6/2013     Years since quittin.3     Smokeless tobacco: Never Used   Substance and Sexual Activity     Alcohol use: Not Currently     Drug use: Never     Sexual activity: Not Currently   Lifestyle     Physical activity:     Days per week: Not on file     Minutes per session: Not on file     Stress: Not on file   Relationships     Social connections:     Talks on phone: Not on file     Gets together: Not on file     Attends Evangelical service: Not on file     Active member of club or organization: Not on file     Attends meetings of clubs or organizations: Not on file     Relationship status: Not on file     Intimate partner violence:     Fear of current or ex partner: Not on file     Emotionally abused: Not on file     Physically abused: Not on file     Forced sexual activity: Not on file   Other Topics Concern     Parent/sibling w/ CABG, MI or angioplasty before 65F 55M? Not Asked   Social History Narrative    Pt has three daughters and two sons, single.  Her daughter Court, see's her often at the Nursing Home ([facility]).  Moved into Nursing Home in 2011 after a hospitalization for ALY.  Moved from [place]  in 2002 to [facility]. Has 5 grandchildren.     Family History   Problem Relation Age of Onset     Hypertension Mother      Cerebrovascular Disease Mother      Glaucoma Father      Cancer Father      Diabetes Sister      Glaucoma Sister      Cancer - colorectal Other      Cerebrovascular Disease Other      Skin Cancer No family hx of      Melanoma No family hx of      A1c                       6.5                    5/24/2019  SUBJECTIVE FINDINGS: A 69-year-old female returns to clinic for diabetic foot care and toenail care.  Relates to peripheral neuropathy and is wearing Diabetic shoes and compression socks.  Presents in her wheelchair.        OBJECTIVE FINDINGS: DP and PT are 1/4 bilaterally.  She has decreased hair growth bilaterally.  She has minimal peripheral edema bilaterally.  She has incurvated, thickened, brittle nails with subungual debris, dystrophy and discoloration 1, 2 and 5 bilaterally and to a lesser degree 3 and 4 bilaterally.  She is a right plantar medial heel dry intact skin, it appears to be healed blister hyperkeratotic skin.  There is no erythema, no drainage, no odor, no calor bilaterally.  She has decreased ankle joint dorsiflexion bilaterally.       ASSESSMENT AND PLAN: Onychomycosis bilaterally, onychauxis bilaterally.  She is diabetic with peripheral neuropathy and vascular disease.  Diagnosis and treatment options discussed with her.  All the nails were debrided or reduced bilaterally upon consent.  She will return to clinic and see me in 2-3 months.     Again, thank you for allowing me to participate in the care of your patient.      Sincerely,    Vu Grant DPM

## 2019-07-11 NOTE — PROGRESS NOTES
Past Medical History:   Diagnosis Date     AION (anterior ischaemic optic neuropathy), left eye     NAION LE     CAD (coronary artery disease) 3/2009    [facility]; Angio  UM- normal coronary arteries     Cataract      CVA (cerebral vascular accident) (H)     admitted at [facility]     on Cymbalta     Diabetes mellitus, type 2 (H)      Diabetic nephropathy (H)      Diabetic neuropathy (H)     severe     Diabetic retinopathy (H)      GERD (gastroesophageal reflux disease)      Hyperlipidemia      Hypertension     ECHO 2013, TDS, NL EF     POAG (primary open-angle glaucoma)     adv BE     Seasonal allergies      Tubular adenoma of colon 2013    repeat colonoscopy in 2018     Patient Active Problem List   Diagnosis     Cataract     CAD (coronary artery disease)     Diabetes mellitus, type 2 (H)     Diabetic nephropathy (H)     Diabetic neuropathy (H)     Diabetic retinopathy (H)     GERD (gastroesophageal reflux disease)     Glaucoma     Hypertension     Hyperlipidemia     CVA (cerebral vascular accident) (H)     Morbid obesity (H)     Anemia     Seasonal allergies     Depression     Tubular adenoma of colon     Leg weakness     Dermatitis, seborrheic     Eczematous dermatitis     History of coronary artery disease     Venous stasis dermatitis of both lower extremities     Uncontrolled type 2 diabetes mellitus (H)     Chronic dermatitis of hands     Neoplasm of uncertain behavior of skin     Past Surgical History:   Procedure Laterality Date      SECTION       COLONOSCOPY  7/15/2013    Tubular adenoma; repeat in 2018;Procedure: COMBINED COLONOSCOPY, SINGLE BIOPSY/POLYPECTOMY BY BIOPSY;;  Surgeon: Don King MD;  Tubular adenoma     EXTRACAPSULAR CATARACT EXTRATION WITH INTRAOCULAR LENS IMPLANT  11-10-09, 2-9-10    11-10-09 Lt, 2-9-10 Rt; Left eye 2012     Social History     Socioeconomic History     Marital status: Single     Spouse name: Not on file     Number of  children: Not on file     Years of education: Not on file     Highest education level: Not on file   Occupational History     Not on file   Social Needs     Financial resource strain: Not on file     Food insecurity:     Worry: Not on file     Inability: Not on file     Transportation needs:     Medical: Not on file     Non-medical: Not on file   Tobacco Use     Smoking status: Former Smoker     Types: Cigarettes     Start date: 1968     Last attempt to quit: 3/6/2013     Years since quittin.3     Smokeless tobacco: Never Used   Substance and Sexual Activity     Alcohol use: Not Currently     Drug use: Never     Sexual activity: Not Currently   Lifestyle     Physical activity:     Days per week: Not on file     Minutes per session: Not on file     Stress: Not on file   Relationships     Social connections:     Talks on phone: Not on file     Gets together: Not on file     Attends Hindu service: Not on file     Active member of club or organization: Not on file     Attends meetings of clubs or organizations: Not on file     Relationship status: Not on file     Intimate partner violence:     Fear of current or ex partner: Not on file     Emotionally abused: Not on file     Physically abused: Not on file     Forced sexual activity: Not on file   Other Topics Concern     Parent/sibling w/ CABG, MI or angioplasty before 65F 55M? Not Asked   Social History Narrative    Pt has three daughters and two sons, single.  Her daughter Court, see's her often at the Nursing Home ([facility]).  Moved into Nursing Home in 2011 after a hospitalization for ALY.  [place]. Has 5 grandchildren.     Family History   Problem Relation Age of Onset     Hypertension Mother      Cerebrovascular Disease Mother      Glaucoma Father      Cancer Father      Diabetes Sister      Glaucoma Sister      Cancer - colorectal Other      Cerebrovascular Disease Other      Skin Cancer No family hx  of      Melanoma No family hx of      A1c                       6.5                    5/24/2019  SUBJECTIVE FINDINGS: A 69-year-old female returns to clinic for diabetic foot care and toenail care.  Relates to peripheral neuropathy and is wearing Diabetic shoes and compression socks.  Presents in her wheelchair.        OBJECTIVE FINDINGS: DP and PT are 1/4 bilaterally.  She has decreased hair growth bilaterally.  She has minimal peripheral edema bilaterally.  She has incurvated, thickened, brittle nails with subungual debris, dystrophy and discoloration 1, 2 and 5 bilaterally and to a lesser degree 3 and 4 bilaterally.  She is a right plantar medial heel dry intact skin, it appears to be healed blister hyperkeratotic skin.  There is no erythema, no drainage, no odor, no calor bilaterally.  She has decreased ankle joint dorsiflexion bilaterally.       ASSESSMENT AND PLAN: Onychomycosis bilaterally, onychauxis bilaterally.  She is diabetic with peripheral neuropathy and vascular disease.  Diagnosis and treatment options discussed with her.  All the nails were debrided or reduced bilaterally upon consent.  She will return to clinic and see me in 2-3 months.

## 2019-07-15 ENCOUNTER — OFFICE VISIT (OUTPATIENT)
Dept: CARDIOLOGY | Facility: CLINIC | Age: 69
End: 2019-07-15
Attending: INTERNAL MEDICINE
Payer: COMMERCIAL

## 2019-07-15 ENCOUNTER — PRE VISIT (OUTPATIENT)
Dept: CARDIOLOGY | Facility: CLINIC | Age: 69
End: 2019-07-15

## 2019-07-15 VITALS
DIASTOLIC BLOOD PRESSURE: 80 MMHG | WEIGHT: 218 LBS | OXYGEN SATURATION: 100 % | HEART RATE: 70 BPM | HEIGHT: 63 IN | BODY MASS INDEX: 38.62 KG/M2 | SYSTOLIC BLOOD PRESSURE: 134 MMHG

## 2019-07-15 DIAGNOSIS — Z74.09 DIFFICULTY TRANSFERRING FROM WHEELCHAIR TO CHAIR: ICD-10-CM

## 2019-07-15 DIAGNOSIS — E11.40 TYPE 2 DIABETES MELLITUS WITH DIABETIC NEUROPATHY, WITH LONG-TERM CURRENT USE OF INSULIN (H): ICD-10-CM

## 2019-07-15 DIAGNOSIS — I10 ESSENTIAL HYPERTENSION: ICD-10-CM

## 2019-07-15 DIAGNOSIS — Z79.4 TYPE 2 DIABETES MELLITUS WITH DIABETIC NEUROPATHY, WITH LONG-TERM CURRENT USE OF INSULIN (H): ICD-10-CM

## 2019-07-15 DIAGNOSIS — Z01.810 PRE-OPERATIVE CARDIOVASCULAR EXAMINATION: Primary | ICD-10-CM

## 2019-07-15 DIAGNOSIS — Z86.73 HISTORY OF CVA (CEREBROVASCULAR ACCIDENT): ICD-10-CM

## 2019-07-15 DIAGNOSIS — I25.2 HISTORY OF ST ELEVATION MYOCARDIAL INFARCTION (STEMI): ICD-10-CM

## 2019-07-15 PROCEDURE — 93005 ELECTROCARDIOGRAM TRACING: CPT | Mod: ZF

## 2019-07-15 PROCEDURE — G0463 HOSPITAL OUTPT CLINIC VISIT: HCPCS | Mod: 25,ZF

## 2019-07-15 PROCEDURE — 99204 OFFICE O/P NEW MOD 45 MIN: CPT | Mod: 25 | Performed by: INTERNAL MEDICINE

## 2019-07-15 PROCEDURE — 93010 ELECTROCARDIOGRAM REPORT: CPT | Mod: 59 | Performed by: INTERNAL MEDICINE

## 2019-07-15 RX ORDER — ASPIRIN 81 MG/1
81 TABLET, CHEWABLE ORAL DAILY
COMMUNITY
End: 2019-07-23

## 2019-07-15 ASSESSMENT — MIFFLIN-ST. JEOR: SCORE: 1492.97

## 2019-07-15 ASSESSMENT — PAIN SCALES - GENERAL: PAINLEVEL: NO PAIN (0)

## 2019-07-15 NOTE — LETTER
7/15/2019      RE: Cristal Preciado  [facility]  [address]  [place] [address]       Dear Colleague,    Thank you for the opportunity to participate in the care of your patient, Cristal Preciado, at the [facility] Please see a copy of my visit note below.    Referring provider:Linh Cardoso MD    HPI: Ms. Cristal Preciado is a 69 year old  female with PMH significant for STEMI w/ RCA stent in 2009, HTN, CVA, DM2 with chronic deconditioning and wheelchair-bound.    The patient is being seen today for preoperative cardiovascular examination prior to planned hysterectomy for endometrial hyperplasia.  The patient was last seen in cardiology in 2013 and she was recommended coronary angiogram for chest discomfort symptoms.  However coronary angiogram was not completed and she did not have further cardiology follow-up since then.    The patient lives in a nursing home and she is wheelchair-bound after her CVA.  She does upper body exercises on her wheelchair. The patient denies a history of chest discomfort, dyspnea, PND, orthopnea, pedal edema, palpitations, lightheadedness, and syncope  Patient has a history of smoking quit date 2013.  Patient has history of diabetes, hypertension and hyperlipidemia.    The patient is currently on aspirin 81 mg daily, chlorthalidone 25 mg, metoprolol 100 mg twice daily, lisinopril 40 mg daily, atorvastatin 80 mg, metformin and insulin.  Prior cardiac tests include a nuclear stress test from 4/25/2018 [facility].  Pharmacologic nuclear stress test was negative for inducible ischemia.  LV ejection fraction was estimated at 61%.  Last echo is from 2013 showed normal LV function with no valvular disease.    I reviewed EKG in clinic today which shows SR, no Q waves, no ST changes. Normal NM/QTS/QTc. V1-V2 TWI noted.    Medications, personal, family, and social history reviewed with  patient and revised.    PAST MEDICAL HISTORY:  Past Medical History:   Diagnosis Date     AION (anterior ischaemic optic neuropathy), left eye     NAION LE     CAD (coronary artery disease) 3/2009    [facility]; Angio 2013 UM- normal coronary arteries     Cataract      CVA (cerebral vascular accident) (H)     admitted at [facility]     Depression     on Cymbalta     Diabetes mellitus, type 2 (H)      Diabetic nephropathy (H)      Diabetic neuropathy (H)     severe     Diabetic retinopathy (H)      GERD (gastroesophageal reflux disease)      Hyperlipidemia      Hypertension     ECHO 2013, TDS, NL EF     POAG (primary open-angle glaucoma)     adv BE     Seasonal allergies      Tubular adenoma of colon 2013    repeat colonoscopy in 2018       CURRENT MEDICATIONS:  Current Outpatient Medications   Medication Sig Dispense Refill     acetaminophen (TYLENOL) 500 MG tablet Take 1-2 tablets by mouth every 6 hours as needed. Tylenol Extra Strength.        allopurinol (ZYLOPRIM) 100 MG tablet Take 2 tablets by mouth daily. (Patient not taking: Reported on 6/18/2019) 180 tablet 1     ASPIRIN LOW DOSE 81 MG chewable tablet CHEW AND SWALLOW 1 TAB BY MOUTH ONCE DAILY  99     ASPIRIN PO Take 81 mg by mouth daily       atorvastatin (LIPITOR) 80 MG tablet Take 1 tablet by mouth daily. Pt needs to have labs done prior to further refills. 90 tablet 0     calcium carbonate 600 mg-vitamin D 400 units (CALTRATE) 600-400 MG-UNIT per tablet        Calcium Citrate-Vitamin D (CALCIUM CITRATE + D PO) 600/400 mg/unit take 1 tabs daily twice daily.        carboxymethylcellulose (REFRESH PLUS) 0.5 % SOLN 1 drop 3 times daily as needed.       CHLORTHALIDONE PO Take 25 mg by mouth daily       clobetasol (TEMOVATE) 0.05 % external solution Apply topically 2 times daily To itchy and scaly areas of scalp as needed. (Patient not taking: Reported on 6/18/2019) 60 mL 3     dorzolamide-timolol (COSOPT) 2-0.5 % ophthalmic solution Place 1 drop into  both eyes 2 times daily       DULoxetine HCl (CYMBALTA PO) Take 60 mg by mouth daily       Elastic Bandages & Supports (JOBST KNEE HIGH COMPRESSION SM) MISC JOBST 20-30MMHG COMPRESSION SM MISC   To both legs during waking hours daily for leg swelling and venous stasis dermatitis. Do not sleep in stockings. 2 each 3     ferrous sulfate (IRON) 325 (65 FE) MG tablet Take 325 mg by mouth daily (with breakfast)       folic acid (FOLVITE) 1 MG tablet Take 1 mg by mouth daily.       gabapentin (NEURONTIN) 600 MG tablet Take 600 mg in AM , 600 mg afternoon and 900 mg at HS. 90 tablet 1     ibuprofen (ADVIL,MOTRIN) 400 MG tablet Take 400 mg by mouth every 6 hours as needed.       insulin aspart (NOVOLOG PEN) 100 UNIT/ML injection Inject 76-96 Units Subcutaneous HOLD WHEN TAKING U500       insulin aspart (NOVOLOG VIAL) 100 UNITS/ML injection Will discontinue once starting U500       insulin pen needle (B-D U/F) 31G X 5 MM Use 5 time(s) per day.  Please dispense as BD Pen Needle Mini U/F 31G x 5  each 11     insulin reg HIGH CONC (HUMULIN R U-500 KWIKPEN) 500 UNIT/ML PEN soln Inject 50 units at breakfast, 50 units at lunch and 60 units at dinner. 20 mL 11     ketoconazole (NIZORAL) 2 % cream Apply topically 2 times daily To affected right ear mixed with triamcinolone ointment until return to clinic. 60 g 5     ketoconazole (NIZORAL) 2 % shampoo To entire wet scalp and ears and then wash off after 5 minutes three times a week. 240 mL 11     latanoprost (XALATAN) 0.005 % ophthalmic solution 1 drop At Bedtime. Left eye       liraglutide (VICTOZA) 18 MG/3ML SOLN Inject 1.8 mg Subcutaneous daily. Start with 0.6 mg x 5 days, then increase to 1.2 mg x 5 days, then 1.8 mg thereafter.  Do not advance to higher dose if you have nausea. (Patient not taking: Reported on 6/18/2019) 3 Month 3     lisinopril (PRINIVIL,ZESTRIL) 5 MG tablet Take 2 tablets by mouth daily. Take one tab daily/Hold for SBP < 110 (Patient taking differently:  Take 40 mg by mouth daily Take one tab daily/Hold for SBP < 110) 90 tablet 3     loratadine (CLARITIN) 10 MG tablet Take 10 mg by mouth as needed.       metFORMIN (GLUCOPHAGE-XR) 500 MG 24 hr tablet Take 2 tablets (1,000 mg) by mouth daily (with dinner) 180 tablet 3     metoprolol (LOPRESSOR) 10 mg/mL SUSP Take 100 mg by mouth 2 times daily       mometasone (ELOCON) 0.1 % cream Apply sparingly to left medial ankle area daily.  Do not apply to face. (Patient not taking: Reported on 2019) 45 g 0     nitroGLYCERIN (NITROSTAT) 0.4 MG SL tablet Place 0.4 mg under the tongue every 5 minutes as needed.       olopatadine HCl (PATADAY) 0.2 % SOLN Place 1 drop into both eyes daily as needed For itchy eyes 1 Bottle 5     Podiatric Products (AMLACTIN FOOT CREAM THERAPY EX) Externally apply 12 % topically       Pregabalin (LYRICA PO)        ranitidine (ZANTAC) 150 MG tablet Take 150 mg by mouth 2 times daily       sodium chloride (SODIUM CHLORIDE) 0.65 % nasal spray Spray 1 spray in nostril daily as needed.       traMADol (ULTRAM) 50 MG tablet        triamcinolone (KENALOG) 0.1 % ointment Apply topically daily To legs under compression stockings for stasis dermatitis discoloration. 60 g 5     UNABLE TO FIND Place in ear(s) 3 times daily MEDICATION NAME: Mineral oil ear drops as needed.         PAST SURGICAL HISTORY:  Past Surgical History:   Procedure Laterality Date      SECTION       COLONOSCOPY  7/15/2013    Tubular adenoma; repeat in 2018;Procedure: COMBINED COLONOSCOPY, SINGLE BIOPSY/POLYPECTOMY BY BIOPSY;;  Surgeon: Don King MD;  Tubular adenoma     EXTRACAPSULAR CATARACT EXTRATION WITH INTRAOCULAR LENS IMPLANT  11-10-09, 2-9-10    11-10-09 Lt, 2-9-10 Rt; Left eye 2012       ALLERGIES:     Allergies   Allergen Reactions     Dust Mites Other (See Comments)     Sneezing runny eyes and nose.     Food Allergy Formula Hives     Mountain Dew and Walnuts     Pollen Extract Other (See Comments)      "Sneezing runny eyes and nose.       FAMILY HISTORY:  Family History   Problem Relation Age of Onset     Hypertension Mother      Cerebrovascular Disease Mother      Glaucoma Father      Cancer Father      Diabetes Sister      Glaucoma Sister      Cancer - colorectal Other      Cerebrovascular Disease Other      Skin Cancer No family hx of      Melanoma No family hx of          SOCIAL HISTORY:  Social History     Tobacco Use     Smoking status: Former Smoker     Types: Cigarettes     Start date: 1968     Last attempt to quit: 3/6/2013     Years since quittin.3     Smokeless tobacco: Never Used   Substance Use Topics     Alcohol use: Not Currently     Drug use: Never       ROS:   Constitutional: No fever, chills, or sweats. Weight stable.   ENT: No visual disturbance, ear ache, epistaxis, sore throat.   Cardiovascular: As per HPI.   Respiratory: No cough, hemoptysis.    GI: No nausea, vomiting, hematemesis, melena, or hematochezia.   : No hematuria.   Integument: Negative.   Psychiatric: Negative.   Hematologic:  No easy bruising, no easy bleeding.  Neuro: Negative.   Endocrinology: No significant heat or cold intolerance   Musculoskeletal: No myalgia.    Exam:  Ht 1.6 m (5' 3\")   Wt 98.9 kg (218 lb)   BMI 38.62 kg/m    Patient examined on her wheelchair.  GENERAL APPEARANCE: alert and no distress  HEENT: no icterus, no central cyanosis  LYMPH/NECK: no adenopathy, no asymmetry, JVP not elevated, no carotid bruits.  RESPIRATORY: lungs clear to auscultation - no rales, rhonchi or wheezes, no use of accessory muscles, no retractions, respirations are unlabored, normal respiratory rate  CARDIOVASCULAR: regular rhythm, normal S1, S2, no S3 or S4 and no murmur, click or rub, precordium quiet with normal PMI.  GI: soft, non tender  EXTREMITIES: 1+ bilateral edema  NEURO: alert, normal speech,and affect  SKIN: no ecchymoses, no rashes     I have reviewed the labs and personally reviewed the imaging below and made " my comment in the assessment and plan.    Labs:  CBC RESULTS:   Lab Results   Component Value Date    WBC 4.0 03/06/2014    RBC 3.12 (L) 03/06/2014    HGB 10.1 (L) 10/24/2016    HCT 30.7 (L) 03/06/2014    MCV 98 03/06/2014    MCH 32.4 03/06/2014    MCHC 32.9 03/06/2014    RDW 13.4 03/06/2014     03/06/2014       BMP RESULTS:  Lab Results   Component Value Date     10/24/2016    POTASSIUM 4.5 03/19/2018    CHLORIDE 107 10/24/2016    CO2 26 10/24/2016    ANIONGAP 7 10/24/2016     (H) 10/24/2016    BUN 16 10/24/2016    CR 1.10 03/19/2018    GFRESTIMATED 60 03/19/2018    GFRESTBLACK 50 03/19/2018    OLAF 9.6 10/24/2016        INR RESULTS:  Lab Results   Component Value Date    INR 1.12 03/28/2013    INR 0.95 09/14/2010    INR 1.02 06/28/2007    INR 1.00 08/12/2005     Nuclear stress test 4/25/2018 [facility]   The pharmacologic nuclear stress test is negative for inducible   myocardial ischemia or infarction.    The left ventricular ejection fraction is 61%.    There is no prior study available.    Echocardiogram 2013  Technically difficult study. Poor acoustic windows. Global and regional left   ventricular function is normal with an EF of 55-60%. Pulmonary artery   systolic pressure cannot be assessed. The inferior vena cava  is normal      Assessment and Plan:   Ms. Cristal Preciado is a 69 year old  female with PMH significant for STEMI w/ RCA stent in 2009, HTN, CVA, DM2 with chronic deconditioning and wheelchair-bound.    1. Pre-operative cardiovascular examination: The patient is asymptomatic from cardiac standpoint. Her only physical activity is arm exercise on her wheelchair.  Vital signs are normal. Recent stress test one year ago did not show myocardial ischemia. ECG reviewed today is unremarkable. Patient euvolemic on exam. Patient`s perioperative risk for MACE is elevated at 11% due to presence of DM, prior hx of CVA and CAD. If the patient is willing to undergo surgery with the presumed  risk, no cardiac testing is required at this point.    2. HTN: Well controlled with lisinopril, chlorthalidone and metoprolol.    3. Hx of MI: Patient denies cardiac symptoms. LV function is normal. No ischemia. On ASA and statin. No medication changes.    4. DM2: On insulin and metformin.    Continue current medications. RTC as needed.    A total of 30 minutes spent face-toface with greater than 50% of the time spent in counseling and coordinating cares of the issues above.     Please donot hesitate to contact me if you have any questions or concerns. Again, thank you for allowing me to participate in the care of your patient.    Ramesh AVELAR MD  [facility] Division of Cardiology  Pager [phone]

## 2019-07-15 NOTE — PROGRESS NOTES
Referring provider:Linh Cardoso MD    HPI: Ms. Cristal Preciado is a 69 year old  female with PMH significant for STEMI w/ RCA stent in 2009, HTN, CVA, DM2 with chronic deconditioning and wheelchair-bound.    The patient is being seen today for preoperative cardiovascular examination prior to planned hysterectomy for endometrial hyperplasia.  The patient was last seen in cardiology in 2013 and she was recommended coronary angiogram for chest discomfort symptoms.  However coronary angiogram was not completed and she did not have further cardiology follow-up since then.    The patient lives in a nursing home and she is wheelchair-bound after her CVA.  She does upper body exercises on her wheelchair. The patient denies a history of chest discomfort, dyspnea, PND, orthopnea, pedal edema, palpitations, lightheadedness, and syncope  Patient has a history of smoking quit date 2013.  Patient has history of diabetes, hypertension and hyperlipidemia.    The patient is currently on aspirin 81 mg daily, chlorthalidone 25 mg, metoprolol 100 mg twice daily, lisinopril 40 mg daily, atorvastatin 80 mg, metformin and insulin.  Prior cardiac tests include a nuclear stress test from 4/25/2018 [facility].  Pharmacologic nuclear stress test was negative for inducible ischemia.  LV ejection fraction was estimated at 61%.  Last echo is from 2013 showed normal LV function with no valvular disease.    I reviewed EKG in clinic today which shows SR, no Q waves, no ST changes. Normal MD/QTS/QTc. V1-V2 TWI noted.    Medications, personal, family, and social history reviewed with patient and revised.    PAST MEDICAL HISTORY:  Past Medical History:   Diagnosis Date     AION (anterior ischaemic optic neuropathy), left eye     NAION LE     CAD (coronary artery disease) 3/2009    [facility]; Angio 2013 [facility]- normal coronary arteries     Cataract      CVA (cerebral vascular accident) (H)     admitted at [facility]      on Cymbalta     Diabetes mellitus, type 2 (H)      Diabetic nephropathy (H)      Diabetic neuropathy (H)     severe     Diabetic retinopathy (H)      GERD (gastroesophageal reflux disease)      Hyperlipidemia      Hypertension     ECHO 2013, TDS, NL EF     POAG (primary open-angle glaucoma)     adv BE     Seasonal allergies      Tubular adenoma of colon 2013    repeat colonoscopy in 2018       CURRENT MEDICATIONS:  Current Outpatient Medications   Medication Sig Dispense Refill     acetaminophen (TYLENOL) 500 MG tablet Take 1-2 tablets by mouth every 6 hours as needed. Tylenol Extra Strength.        allopurinol (ZYLOPRIM) 100 MG tablet Take 2 tablets by mouth daily. (Patient not taking: Reported on 6/18/2019) 180 tablet 1     ASPIRIN LOW DOSE 81 MG chewable tablet CHEW AND SWALLOW 1 TAB BY MOUTH ONCE DAILY  99     ASPIRIN PO Take 81 mg by mouth daily       atorvastatin (LIPITOR) 80 MG tablet Take 1 tablet by mouth daily. Pt needs to have labs done prior to further refills. 90 tablet 0     calcium carbonate 600 mg-vitamin D 400 units (CALTRATE) 600-400 MG-UNIT per tablet        Calcium Citrate-Vitamin D (CALCIUM CITRATE + D PO) 600/400 mg/unit take 1 tabs daily twice daily.        carboxymethylcellulose (REFRESH PLUS) 0.5 % SOLN 1 drop 3 times daily as needed.       CHLORTHALIDONE PO Take 25 mg by mouth daily       clobetasol (TEMOVATE) 0.05 % external solution Apply topically 2 times daily To itchy and scaly areas of scalp as needed. (Patient not taking: Reported on 6/18/2019) 60 mL 3     dorzolamide-timolol (COSOPT) 2-0.5 % ophthalmic solution Place 1 drop into both eyes 2 times daily       DULoxetine HCl (CYMBALTA PO) Take 60 mg by mouth daily       Elastic Bandages & Supports (JOBST KNEE HIGH COMPRESSION SM) MISC JOBST 20-30MMHG COMPRESSION SM MISC   To both legs during waking hours daily for leg swelling and venous stasis dermatitis. Do not sleep in stockings. 2 each 3     ferrous sulfate (IRON) 325 (65 FE)  MG tablet Take 325 mg by mouth daily (with breakfast)       folic acid (FOLVITE) 1 MG tablet Take 1 mg by mouth daily.       gabapentin (NEURONTIN) 600 MG tablet Take 600 mg in AM , 600 mg afternoon and 900 mg at HS. 90 tablet 1     ibuprofen (ADVIL,MOTRIN) 400 MG tablet Take 400 mg by mouth every 6 hours as needed.       insulin aspart (NOVOLOG PEN) 100 UNIT/ML injection Inject 76-96 Units Subcutaneous HOLD WHEN TAKING U500       insulin aspart (NOVOLOG VIAL) 100 UNITS/ML injection Will discontinue once starting U500       insulin pen needle (B-D U/F) 31G X 5 MM Use 5 time(s) per day.  Please dispense as BD Pen Needle Mini U/F 31G x 5  each 11     insulin reg HIGH CONC (HUMULIN R U-500 KWIKPEN) 500 UNIT/ML PEN soln Inject 50 units at breakfast, 50 units at lunch and 60 units at dinner. 20 mL 11     ketoconazole (NIZORAL) 2 % cream Apply topically 2 times daily To affected right ear mixed with triamcinolone ointment until return to clinic. 60 g 5     ketoconazole (NIZORAL) 2 % shampoo To entire wet scalp and ears and then wash off after 5 minutes three times a week. 240 mL 11     latanoprost (XALATAN) 0.005 % ophthalmic solution 1 drop At Bedtime. Left eye       liraglutide (VICTOZA) 18 MG/3ML SOLN Inject 1.8 mg Subcutaneous daily. Start with 0.6 mg x 5 days, then increase to 1.2 mg x 5 days, then 1.8 mg thereafter.  Do not advance to higher dose if you have nausea. (Patient not taking: Reported on 6/18/2019) 3 Month 3     lisinopril (PRINIVIL,ZESTRIL) 5 MG tablet Take 2 tablets by mouth daily. Take one tab daily/Hold for SBP < 110 (Patient taking differently: Take 40 mg by mouth daily Take one tab daily/Hold for SBP < 110) 90 tablet 3     loratadine (CLARITIN) 10 MG tablet Take 10 mg by mouth as needed.       metFORMIN (GLUCOPHAGE-XR) 500 MG 24 hr tablet Take 2 tablets (1,000 mg) by mouth daily (with dinner) 180 tablet 3     metoprolol (LOPRESSOR) 10 mg/mL SUSP Take 100 mg by mouth 2 times daily        Pulses equal bilaterally, no edema present. mometasone (ELOCON) 0.1 % cream Apply sparingly to left medial ankle area daily.  Do not apply to face. (Patient not taking: Reported on 2019) 45 g 0     nitroGLYCERIN (NITROSTAT) 0.4 MG SL tablet Place 0.4 mg under the tongue every 5 minutes as needed.       olopatadine HCl (PATADAY) 0.2 % SOLN Place 1 drop into both eyes daily as needed For itchy eyes 1 Bottle 5     Podiatric Products (AMLACTIN FOOT CREAM THERAPY EX) Externally apply 12 % topically       Pregabalin (LYRICA PO)        ranitidine (ZANTAC) 150 MG tablet Take 150 mg by mouth 2 times daily       sodium chloride (SODIUM CHLORIDE) 0.65 % nasal spray Spray 1 spray in nostril daily as needed.       traMADol (ULTRAM) 50 MG tablet        triamcinolone (KENALOG) 0.1 % ointment Apply topically daily To legs under compression stockings for stasis dermatitis discoloration. 60 g 5     UNABLE TO FIND Place in ear(s) 3 times daily MEDICATION NAME: Mineral oil ear drops as needed.         PAST SURGICAL HISTORY:  Past Surgical History:   Procedure Laterality Date      SECTION       COLONOSCOPY  7/15/2013    Tubular adenoma; repeat in 2018;Procedure: COMBINED COLONOSCOPY, SINGLE BIOPSY/POLYPECTOMY BY BIOPSY;;  Surgeon: Don King MD;  Tubular adenoma     EXTRACAPSULAR CATARACT EXTRATION WITH INTRAOCULAR LENS IMPLANT  11-10-09, 2-9-10    11-10-09 Lt, 2-9-10 Rt; Left eye 2012       ALLERGIES:     Allergies   Allergen Reactions     Dust Mites Other (See Comments)     Sneezing runny eyes and nose.     Food Allergy Formula Hives     Mountain Dew and Walnuts     Pollen Extract Other (See Comments)     Sneezing runny eyes and nose.       FAMILY HISTORY:  Family History   Problem Relation Age of Onset     Hypertension Mother      Cerebrovascular Disease Mother      Glaucoma Father      Cancer Father      Diabetes Sister      Glaucoma Sister      Cancer - colorectal Other      Cerebrovascular Disease Other      Skin Cancer No family hx of       "Melanoma No family hx of          SOCIAL HISTORY:  Social History     Tobacco Use     Smoking status: Former Smoker     Types: Cigarettes     Start date: 1968     Last attempt to quit: 3/6/2013     Years since quittin.3     Smokeless tobacco: Never Used   Substance Use Topics     Alcohol use: Not Currently     Drug use: Never       ROS:   Constitutional: No fever, chills, or sweats. Weight stable.   ENT: No visual disturbance, ear ache, epistaxis, sore throat.   Cardiovascular: As per HPI.   Respiratory: No cough, hemoptysis.    GI: No nausea, vomiting, hematemesis, melena, or hematochezia.   : No hematuria.   Integument: Negative.   Psychiatric: Negative.   Hematologic:  No easy bruising, no easy bleeding.  Neuro: Negative.   Endocrinology: No significant heat or cold intolerance   Musculoskeletal: No myalgia.    Exam:  Ht 1.6 m (5' 3\")   Wt 98.9 kg (218 lb)   BMI 38.62 kg/m   Patient examined on her wheelchair.  GENERAL APPEARANCE: alert and no distress  HEENT: no icterus, no central cyanosis  LYMPH/NECK: no adenopathy, no asymmetry, JVP not elevated, no carotid bruits.  RESPIRATORY: lungs clear to auscultation - no rales, rhonchi or wheezes, no use of accessory muscles, no retractions, respirations are unlabored, normal respiratory rate  CARDIOVASCULAR: regular rhythm, normal S1, S2, no S3 or S4 and no murmur, click or rub, precordium quiet with normal PMI.  GI: soft, non tender  EXTREMITIES: 1+ bilateral edema  NEURO: alert, normal speech,and affect  SKIN: no ecchymoses, no rashes     I have reviewed the labs and personally reviewed the imaging below and made my comment in the assessment and plan.    Labs:  CBC RESULTS:   Lab Results   Component Value Date    WBC 4.0 2014    RBC 3.12 (L) 2014    HGB 10.1 (L) 10/24/2016    HCT 30.7 (L) 2014    MCV 98 2014    MCH 32.4 2014    MCHC 32.9 2014    RDW 13.4 2014     2014       BMP RESULTS:  Lab Results "   Component Value Date     10/24/2016    POTASSIUM 4.5 03/19/2018    CHLORIDE 107 10/24/2016    CO2 26 10/24/2016    ANIONGAP 7 10/24/2016     (H) 10/24/2016    BUN 16 10/24/2016    CR 1.10 03/19/2018    GFRESTIMATED 60 03/19/2018    GFRESTBLACK 50 03/19/2018    OLAF 9.6 10/24/2016        INR RESULTS:  Lab Results   Component Value Date    INR 1.12 03/28/2013    INR 0.95 09/14/2010    INR 1.02 06/28/2007    INR 1.00 08/12/2005     Nuclear stress test 4/25/2018 [facility]   The pharmacologic nuclear stress test is negative for inducible   myocardial ischemia or infarction.    The left ventricular ejection fraction is 61%.    There is no prior study available.    Echocardiogram 2013  Technically difficult study. Poor acoustic windows. Global and regional left   ventricular function is normal with an EF of 55-60%. Pulmonary artery   systolic pressure cannot be assessed. The inferior vena cava  is normal      Assessment and Plan:   Ms. Cristal Preciado is a 69 year old  female with PMH significant for STEMI w/ RCA stent in 2009, HTN, CVA, DM2 with chronic deconditioning and wheelchair-bound.    1. Pre-operative cardiovascular examination: The patient is asymptomatic from cardiac standpoint. Her only physical activity is arm exercise on her wheelchair.  Vital signs are normal. Recent stress test one year ago did not show myocardial ischemia. ECG reviewed today is unremarkable. Patient euvolemic on exam. Patient`s perioperative risk for MACE is elevated at 11% due to presence of DM, prior hx of CVA and CAD. If the patient is willing to undergo surgery with the presumed risk, no cardiac testing is required at this point.    2. HTN: Well controlled with lisinopril, chlorthalidone and metoprolol.    3. Hx of MI: Patient denies cardiac symptoms. LV function is normal. No ischemia. On ASA and statin. No medication changes.    4. DM2: On insulin and metformin.    Continue current medications. RTC as needed.    A  total of 30 minutes spent face-toface with greater than 50% of the time spent in counseling and coordinating cares of the issues above.     Please donot hesitate to contact me if you have any questions or concerns. Again, thank you for allowing me to participate in the care of your patient.    Ramesh AVELAR MD  [facility] Division of Cardiology  Pager [phone]

## 2019-07-15 NOTE — NURSING NOTE
Chief Complaint   Patient presents with     New Patient     new to [facility] - last seen by Femi in 2013     Vitals were taken, medications reconciled and EKG performed.    Jatin Chu CMA    2:17 PM

## 2019-07-16 ENCOUNTER — PRE VISIT (OUTPATIENT)
Dept: SURGERY | Facility: CLINIC | Age: 69
End: 2019-07-16

## 2019-07-16 LAB — INTERPRETATION ECG - MUSE: NORMAL

## 2019-07-16 NOTE — TELEPHONE ENCOUNTER
FUTURE VISIT INFORMATION      SURGERY INFORMATION:    Date: 19    Location: [facility] OR    Surgeon:  Linh Cardoso    Anesthesia Type:  Combined General with Block    RECORDS REQUESTED FROM:       Primary Care Provider: Shawna Mesa CNP- [facility]    Pertinent Medical History: CAD, Chronic kidney disease    Most recent EKG+ Tracin/15/19    Most recent ECHO: 3/28/13    Most recent Cardiac Stress Test: 18- [facility]

## 2019-07-22 ENCOUNTER — PRE VISIT (OUTPATIENT)
Dept: SURGERY | Facility: CLINIC | Age: 69
End: 2019-07-22

## 2019-07-23 ENCOUNTER — ANESTHESIA EVENT (OUTPATIENT)
Dept: SURGERY | Facility: CLINIC | Age: 69
DRG: 742 | End: 2019-07-23
Payer: COMMERCIAL

## 2019-07-23 ENCOUNTER — OFFICE VISIT (OUTPATIENT)
Dept: SURGERY | Facility: CLINIC | Age: 69
End: 2019-07-23
Payer: COMMERCIAL

## 2019-07-23 VITALS
DIASTOLIC BLOOD PRESSURE: 62 MMHG | TEMPERATURE: 97.7 F | WEIGHT: 218 LBS | SYSTOLIC BLOOD PRESSURE: 134 MMHG | HEIGHT: 63 IN | HEART RATE: 78 BPM | RESPIRATION RATE: 18 BRPM | OXYGEN SATURATION: 100 % | BODY MASS INDEX: 38.62 KG/M2

## 2019-07-23 DIAGNOSIS — N85.02 ENDOMETRIAL HYPERPLASIA WITH ATYPIA: ICD-10-CM

## 2019-07-23 DIAGNOSIS — Z01.818 PREOP EXAMINATION: Primary | ICD-10-CM

## 2019-07-23 DIAGNOSIS — Z01.818 PREOP EXAMINATION: ICD-10-CM

## 2019-07-23 LAB
ANION GAP SERPL CALCULATED.3IONS-SCNC: 6 MMOL/L (ref 3–14)
BUN SERPL-MCNC: 24 MG/DL (ref 7–30)
CALCIUM SERPL-MCNC: 9.5 MG/DL (ref 8.5–10.1)
CHLORIDE SERPL-SCNC: 104 MMOL/L (ref 94–109)
CO2 SERPL-SCNC: 22 MMOL/L (ref 20–32)
CREAT SERPL-MCNC: 1.17 MG/DL (ref 0.52–1.04)
ERYTHROCYTE [DISTWIDTH] IN BLOOD BY AUTOMATED COUNT: 12.8 % (ref 10–15)
GFR SERPL CREATININE-BSD FRML MDRD: 48 ML/MIN/{1.73_M2}
GLUCOSE SERPL-MCNC: 179 MG/DL (ref 70–99)
HBA1C MFR BLD: 6.6 % (ref 0–5.6)
HCT VFR BLD AUTO: 31.6 % (ref 35–47)
HGB BLD-MCNC: 10 G/DL (ref 11.7–15.7)
MCH RBC QN AUTO: 32.1 PG (ref 26.5–33)
MCHC RBC AUTO-ENTMCNC: 31.6 G/DL (ref 31.5–36.5)
MCV RBC AUTO: 101 FL (ref 78–100)
NT-PROBNP SERPL-MCNC: 54 PG/ML (ref 0–125)
PLATELET # BLD AUTO: 190 10E9/L (ref 150–450)
POTASSIUM SERPL-SCNC: 5.2 MMOL/L (ref 3.4–5.3)
RBC # BLD AUTO: 3.12 10E12/L (ref 3.8–5.2)
SODIUM SERPL-SCNC: 132 MMOL/L (ref 133–144)
WBC # BLD AUTO: 4.6 10E9/L (ref 4–11)

## 2019-07-23 ASSESSMENT — PATIENT HEALTH QUESTIONNAIRE - PHQ9: SUM OF ALL RESPONSES TO PHQ QUESTIONS 1-9: 1

## 2019-07-23 ASSESSMENT — LIFESTYLE VARIABLES: TOBACCO_USE: 1

## 2019-07-23 ASSESSMENT — MIFFLIN-ST. JEOR: SCORE: 1492.97

## 2019-07-23 NOTE — PATIENT INSTRUCTIONS
Preparing for Your Surgery      Name:  Cristal Preciado   MRN:  [id number]   :  1950   Today's Date:  2019     Arriving for surgery:  Surgery date:  19  Arrival time:  9AM  Please come to:       [facility] [address]  [address] [place]  [address]    - ? parking is available in front of the hospital      -    Please proceed to [address] on the 3rd floor. [phone]?     - ?If you are in need of directions, wheelchair or escort please stop at the Information Desk in the lobby.  Inform the information person that you are here for surgery; a wheelchair and escort to [address] will be provided.?     What can I eat or drink?  -  You may have solid food or milk products until 8 hours prior to your surgery. 19, 3:30AM  -  You may have water, apple juice or 7up/Sprite until 2 hours prior to your surgery. 19, 9:30AM    Which medicines can I take?  Stop Aspirin, vitamins and supplements one week prior to surgery.  Hold Ibuprofen for 24 hours and/or Naproxen for 48 hours prior to surgery.   -  Do NOT take these medications in the morning, the day of surgery:   Chlorthalidone   Iron    Folic Acid   Humulin Insulin    Victoza   Lisinopril    Metformin(Glucophage)        -  Please take these medications the day of surgery:    Cosopt eye drops  Duloxetine(cymbalta)    Gabapentin(Neurontin) Metoprolol    Ranitidine(Zantac)  Pregabalin(Lyrica)  -As Needed:    Refresh eye drops  Acetaminophen    Loratadine(Claritin)  Pataday eye drops   Tramadol(Ultram)    How do I prepare myself?  -  Take two showers: one the night before surgery; and one the morning of surgery.         Use Scrubcare or Hibiclens to wash from neck down.  You may use your own     shampoo and conditioner. No other hair products.   -  Do NOT use lotion, powder, deodorant, or antiperspirant the day of your surgery.  -  Do NOT wear any makeup, fingernail polish or jewelry.  - Do not bring your own  medications to the hospital, except for inhalers and eye   drops.  -  Bring your ID and insurance card.    -If you are scheduled to go home the Same Day as surgery you must have a responsible adult as a  and to stay with you overnight the first 24 hours after surgery.     Questions or Concerns:  -If you have questions or concerns regarding the day of surgery, please call [phone].     -For questions after surgery please call your surgeons office.           AFTER YOUR SURGERY  Breathing exercises   Breathing exercises help you recover faster. Take deep breaths and let the air out slowly. This will:     Help you wake up after surgery.    Help prevent complications like pneumonia.  Preventing complications will help you go home sooner.   We may give you a breathing device (incentive spirometer) to encourage you to breathe deeply.   Nausea and vomiting   You may feel sick to your stomach after surgery; if so, let your nurse know.    Pain control:  After surgery, you may have pain. Our goal is to help you manage your pain. Pain medicine will help you feel comfortable enough to do activities that will help you heal.  These activities may include breathing exercises, walking and physical therapy.   To help your health care team treat your pain we will ask: 1) If you have pain  2) where it is located 3) describe your pain in your words  Methods of pain control include medications given by mouth, vein or by nerve block for some surgeries.  Sequential Compression Device (SCD) or Pneumo Boots:  You may need to wear SCD S on your legs or feet. These are wraps connected to a machine that pumps in air and releases it. The repeated pumping helps prevent blood clots from forming.

## 2019-07-23 NOTE — ANESTHESIA PREPROCEDURE EVALUATION
Anesthesia Pre-Procedure Evaluation    Patient: Cristal Preciado   MRN:     [id number] Gender:   female   Age:    69 year old :      1950        Preoperative Diagnosis: Endometrial Hyperplasia With Atypia   Procedure(s):  DaVinci Assisted Total Laparoscopic Hysterectomy, Removal Of Both Tubes And Ovaries     Past Medical History:   Diagnosis Date     AION (anterior ischaemic optic neuropathy), left eye     NAION LE     CAD (coronary artery disease) 3/2009    [facility]; Angio  UM- normal coronary arteries     Cataract      CVA (cerebral vascular accident) (H)     admitted at [facility]     on Cymbalta     Diabetes mellitus, type 2 (H)      Diabetic nephropathy (H)      Diabetic neuropathy (H)     severe     Diabetic retinopathy (H)      GERD (gastroesophageal reflux disease)      Hyperlipidemia      Hypertension     ECHO 2013, TDS, NL EF     POAG (primary open-angle glaucoma)     adv BE     Seasonal allergies      Tubular adenoma of colon     repeat colonoscopy in       Past Surgical History:   Procedure Laterality Date      SECTION       COLONOSCOPY  7/15/2013    Tubular adenoma; repeat in ;Procedure: COMBINED COLONOSCOPY, SINGLE BIOPSY/POLYPECTOMY BY BIOPSY;;  Surgeon: Don King MD;  Tubular adenoma     EXTRACAPSULAR CATARACT EXTRATION WITH INTRAOCULAR LENS IMPLANT  11-10-09, 2-9-10    11-10-09 Lt, 2-9-10 Rt; Left eye 2012     STENT, CORONARY, DELORES      RCA          Anesthesia Evaluation     . Pt has had prior anesthetic. Type: MAC           ROS/MED HX    ENT/Pulmonary:     (+)tobacco use, Past use 67.5 pk yr hx, quit 2013 packs/day  , . .    Neurologic:     (+)CVA date:  with deficits- non-ambulatory, diminished use of hands,     Cardiovascular:     (+) Dyslipidemia, hypertension--CAD, -past MI,-stent,  1 . Taking blood thinners : . . . :. . Previous cardiac testing Echodate:3/28/13results:Interpretation Summary  Technically difficult  study. Poor acoustic windows. Global and regional left   ventricular function is normal with an EF of 55-60%. Pulmonary artery   systolic pressure cannot be assessed. The inferior vena cava is normal.  Stress Testdate:4/25/18 results:  The pharmacologic nuclear stress test is negative for inducible   myocardial ischemia or infarction.    The left ventricular ejection fraction is 61%.    There is no prior study available  ECG reviewed date:7/15/19 results:Sinus rhythm  Normal ECG  When compared with ECG of 28-MAR-2013 11:41,  Inverted T waves have replaced nonspecific T wave abnormality in Anterior leads  Ventricular rate 71 bpm   date: results:          METS/Exercise Tolerance: Comment: Wheelchair bound 1 - Eating, dressing   Hematologic:     (+) History of Transfusion no previous transfusion reaction -      Musculoskeletal: Comment: Pt reports diffuse arthritis, jose in hands        GI/Hepatic: Comment: On zantac    (+) GERD Asymptomatic on medication,       Renal/Genitourinary: Comment: Hospitalized in 2000 for UTI/pyelonephritis    (+) chronic renal disease, type: CRI, Pt does not require dialysis, Pt has no history of transplant,       Endo:     (+) type II DM Last HgA1c: 6.5 date: 5/24/19 Using insulin - not using insulin pump Diabetic complications: nephropathy neuropathy cardiac problems, Obesity, .      Psychiatric:     (+) psychiatric history depression      Infectious Disease:  - neg infectious disease ROS       Malignancy:   (+) Malignancy History of Other  Other CA endometrial hyperplasia Active status post         Other:    (+) No chance of pregnancy C-spine cleared: N/A, H/O Chronic Pain,other significant disability Wheelchair bound                       PHYSICAL EXAM:   Mental Status/Neuro: A/A/O; Age Appropriate   Airway: Facies: Thick Neck  Mallampati: III  Mouth/Opening: Full  TM distance: > 6 cm  Neck ROM: Limited   Respiratory: Auscultation: CTAB     Resp. Rate: Normal     Resp. Effort: Normal     "  CV: Rhythm: Regular  Rate: Age appropriate  Heart: Normal Sounds   Comments:      Dental:  Dentures: Complete                  Lab Results   Component Value Date    WBC 4.0 03/06/2014    HGB 10.1 (L) 10/24/2016    HCT 30.7 (L) 03/06/2014     03/06/2014    CRP <5.0 07/15/2010    SED 46 (H) 07/15/2010     10/24/2016    POTASSIUM 4.5 03/19/2018    CHLORIDE 107 10/24/2016    CO2 26 10/24/2016    BUN 16 10/24/2016    CR 1.10 03/19/2018     (H) 10/24/2016    OLAF 9.6 10/24/2016    PHOS 4.1 03/22/2008    MAG 1.8 03/22/2008    ALBUMIN 4.0 03/06/2014    PROTTOTAL 7.6 03/06/2014    ALT 39 10/12/2015    AST 35 03/06/2014    ALKPHOS 127 03/06/2014    BILITOTAL 0.2 03/06/2014    LIPASE 70 03/19/2008    PTT 28 09/14/2010    INR 1.12 03/28/2013    TSH 1.20 10/24/2016    T4 10.6 10/30/2008    T3 100 04/21/2006       Preop Vitals  BP Readings from Last 3 Encounters:   07/23/19 134/62   07/15/19 134/80   06/18/19 135/69    Pulse Readings from Last 3 Encounters:   07/23/19 78   07/15/19 70   06/18/19 68      Resp Readings from Last 3 Encounters:   07/23/19 18   06/18/19 18   08/21/14 12    SpO2 Readings from Last 3 Encounters:   07/23/19 100%   07/15/19 100%   06/18/19 100%      Temp Readings from Last 1 Encounters:   07/23/19 97.7  F (36.5  C) (Oral)    Ht Readings from Last 1 Encounters:   07/23/19 1.6 m (5' 3\")      Wt Readings from Last 1 Encounters:   07/23/19 98.9 kg (218 lb)    Estimated body mass index is 38.62 kg/m  as calculated from the following:    Height as of this encounter: 1.6 m (5' 3\").    Weight as of this encounter: 98.9 kg (218 lb).     LDA:  Peripheral IV 07/15/13 Left;Anterior Upper forearm (Active)   Number of days: 2199            Assessment:   ASA SCORE: 3    H&P: History and physical reviewed and following examination; no interval change.   Smoking Status:  Non-Smoker/Unknown   NPO Status: NPO Appropriate     Plan:   Anes. Type:  General   Pre-Medication: None   Induction:  IV (Standard) "   Airway: ETT; Oral   Access/Monitoring: PIV   Maintenance: Balanced     Postop Plan:   Postop Pain: Opioids  Postop Sedation/Airway: Not planned  Disposition: Inpatient/Admit     PONV Management:   Adult Risk Factors: Female, Non-Smoker, Postop Opioids   Prevention: Ondansetron, Dexamethasone     CONSENT: Direct conversation   Plan and risks discussed with: Patient   Blood Products: Consented (ALL Blood Products)                  PAC Discussion and Assessment    ASA Classification: 3  Case is suitable for: [facility]  Anesthetic techniques and relevant risks discussed: GA  Invasive monitoring and risk discussed: No  Types:   Possibility and Risk of blood transfusion discussed: No  NPO instructions given:   Additional anesthetic preparation and risks discussed:   Needs early admission to pre-op area:   Other:     PAC Resident/NP Anesthesia Assessment:  Cristal Preciado is a 69 year old female scheduled to undergo DaVinci Assisted Total Laparoscopic Hysterectomy, Removal Of Both Tubes And Ovaries with Linh Cardoso MD on 8/5/19 at [facility] for treatment of Endometrial hyperplasia.     She has the following specific operative considerations:      - Pt has had prior anesthetic. Type: MAC - no complications per pt  - Anesthesia considerations:  Refer to PAC assessment in anesthesia records  - Risk of PONV score = 2.  If > 2, anti-emetic intervention recommended.    CARDIAC: METS 1,wheelchair bound. Requires a lift for transfers, can stand for only a few seconds with assistance. Cannot sit up independently due to instability of her core/trunk. Is able to do exercises in her wheelchair.          - RCRI : High risk surgery (>5% cardiac complication risk) Coronary Artery Disease (MI, positive stress test, angina, Qs on EKG) Cerebrovascular Disease (TIA or CVA) Diabetes Mellitus (on Insulin).  11% risk of major adverse cardiac event.    - MI in 2009, s/p RCA stent    - HTN,  taking chlorthalidone, lisinopril, metoprolol   * Saw cardiologist 7/15/19, cleared for surgery w/ quoted 11% periop risk for MACE *    - NM Stress 4/2018:  EF 61%, no ischemia    PULMONARY:    - ONIEL 4/8 = intermediate risk    - Former smoker, 67.5 pk yr hx, quit 2013    -  no inhaler use    NEURO/PSYCH:    - CVA in 2011 w/ right hemiparesis    GI:  GERD, taking zantac    /RENAL: Renal insufficiency, creatinine 1.14 on 5/2019, 1.46 on 1/2019     ENDO: BMI 38    - DM2, A1c on 5/24/19 was 6.5, on insulin & oral meds    HEME: VTE risk: 3%  - On ASA 81 mg, will stop 7 days prior    ORTHO: limited neck ROM, no TMJ    - Non-ambulatory, right hemiparesis, Decreased mobility: Recommend that assistive devices be utilized during transfers and repositioning in order to prevent injury.      Patient was discussed with Dr Zamorano. Patient is optimized for the proposed procedure, provided labs today are within acceptable range. No further diagnostic evaluation is needed.      Reviewed and Signed by PAC Mid-Level Provider/Resident  Mid-Level Provider/Resident: Haley Lilly PA-C  Date: 7/23/19  Time: 1253    Attending Anesthesiologist Anesthesia Assessment:        Anesthesiologist:   Date:   Time:   Pass/Fail:   Disposition:     PAC Pharmacist Assessment:        Pharmacist:   Date:   Time:        Haley Lilly PA-C

## 2019-07-23 NOTE — H&P
Pre-Operative H & P     CC:  Preoperative exam to assess for increased cardiopulmonary risk while undergoing surgery and anesthesia.    Date of Encounter: 7/23/2019  Primary Care Physician:  Meagan Valdez  Reason for Visit: Endometrial hyperplasia with atypia [N85.02]    HPI  Cristal Preciado is a 70 y/o female who presents for pre-operative H&P in preparation for DaVinci Assisted Total Laparoscopic Hysterectomy, Removal Of Both Tubes And Ovaries with Linh Cardoso MD on 8/5/19 at [facility] for treatment of Endometrial hyperplasia.    Ms. Preciado was noted to have vaginal bleeding by her nursing home staff. She is wheelchair bound due to a prior CVA. She saw Dr Kerns, who performed an office endometrial biopsy, confirming EIN/CAH. She now presents for the above procedure.  PMH is significant for STEMI, s/p RCA stent in 2009, HTN (taking chlorthalidone, Lisinopril, metoprolol), depression, neuropathy, insulin dependent DM2 (A1c on 5/24/19 was 6.5), GERD (taking zantac), adenoma of colon, renal insufficiency (creatinine on 1/10/19 was 1.46). She is anticoagulated with ASA 81 mg.    She was seen in cardiology by Ramesh Shin MD on 7/15/19. Dr Shin states:  The patient is asymptomatic from cardiac standpoint. Her only physical activity is arm exercise on her wheelchair.  Vital signs are normal. Recent stress test one year ago did not show myocardial ischemia. ECG reviewed today is unremarkable. Patient euvolemic on exam. Patient`s perioperative risk for MACE is elevated at 11% due to presence of DM, prior hx of CVA and CAD. If the patient is willing to undergo surgery with the presumed risk, no cardiac testing is required at this point.      History was obtained from patient & chart review.     Past Medical History  Past Medical History:   Diagnosis Date     AION (anterior ischaemic optic neuropathy), left eye     NAION LE     CAD (coronary artery disease) 3/2009     [facility]; Angio  UM- normal coronary arteries     Cataract      CVA (cerebral vascular accident) (H)     admitted at [facility]     Depression     on Cymbalta     Diabetes mellitus, type 2 (H)      Diabetic nephropathy (H)      Diabetic neuropathy (H)     severe     Diabetic retinopathy (H)      GERD (gastroesophageal reflux disease)      Hyperlipidemia      Hypertension     ECHO 2013, TDS, NL EF     POAG (primary open-angle glaucoma)     adv BE     Seasonal allergies      Tubular adenoma of colon     repeat colonoscopy in        Past Surgical History  Past Surgical History:   Procedure Laterality Date      SECTION       COLONOSCOPY  7/15/2013    Tubular adenoma; repeat in 2018;Procedure: COMBINED COLONOSCOPY, SINGLE BIOPSY/POLYPECTOMY BY BIOPSY;;  Surgeon: Don King MD;  Tubular adenoma     EXTRACAPSULAR CATARACT EXTRATION WITH INTRAOCULAR LENS IMPLANT  11-10-09, 2-9-10    11-10-09 Lt, 2-9-10 Rt; Left eye 2012     STENT, CORONARY, DELORES      RCA       Hx of Blood transfusions/reactions: has had transfusion, no reaction per pt     Hx of abnormal bleeding or anti-platelet use: no    Menstrual history: No LMP recorded. Patient is postmenopausal.:      Steroid use in the last year: no    Personal or FH with difficulty with Anesthesia:  no    Prior to Admission Medications  Current Outpatient Medications   Medication Sig Dispense Refill     acetaminophen (TYLENOL) 500 MG tablet Take 1,000 mg by mouth 3 times daily Tylenol Extra Strength       ASPIRIN LOW DOSE 81 MG chewable tablet CHEW AND SWALLOW 1 TAB BY MOUTH ONCE DAILY IN THE MORNING  99     atorvastatin (LIPITOR) 80 MG tablet Take 1 tablet by mouth daily. Pt needs to have labs done prior to further refills. (Patient taking differently: Take 80 mg by mouth At Bedtime Pt needs to have labs done prior to further refills.) 90 tablet 0     carboxymethylcellulose (REFRESH PLUS) 0.5 % SOLN 1 drop 3 times daily as needed.        CHLORTHALIDONE PO Take 25 mg by mouth every morning        dorzolamide-timolol (COSOPT) 2-0.5 % ophthalmic solution Place 1 drop into both eyes 2 times daily       DULoxetine HCl (CYMBALTA PO) Take 60 mg by mouth every morning        ferrous sulfate (IRON) 325 (65 FE) MG tablet Take 325 mg by mouth daily (with lunch)        folic acid (FOLVITE) 1 MG tablet Take 1 mg by mouth every morning        gabapentin (NEURONTIN) 600 MG tablet Take 600 mg in AM , 600 mg afternoon and 900 mg at HS. (Patient taking differently: Take 600 mg by mouth 3 times daily ) 90 tablet 1     insulin reg HIGH CONC (HUMULIN R U-500 KWIKPEN) 500 UNIT/ML PEN soln Inject 50 units at breakfast, 50 units at lunch and 60 units at dinner. 20 mL 11     ketoconazole (NIZORAL) 2 % shampoo To entire wet scalp and ears and then wash off after 5 minutes three times a week. (Patient taking differently: Apply topically twice a week To entire wet scalp and ears and then wash off after 5 minutes two times a week.) 240 mL 11     latanoprost (XALATAN) 0.005 % ophthalmic solution 1 drop At Bedtime. Left eye       liraglutide (VICTOZA) 18 MG/3ML SOLN Inject 1.8 mg Subcutaneous daily. Start with 0.6 mg x 5 days, then increase to 1.2 mg x 5 days, then 1.8 mg thereafter.  Do not advance to higher dose if you have nausea. (Patient taking differently: Inject 1.8 mg Subcutaneous every morning ) 3 Month 3     lisinopril (PRINIVIL,ZESTRIL) 5 MG tablet Take 2 tablets by mouth daily. Take one tab daily/Hold for SBP < 110 (Patient taking differently: Take 40 mg by mouth every morning Take one tab daily/Hold for SBP < 110) 90 tablet 3     metFORMIN (GLUCOPHAGE-XR) 500 MG 24 hr tablet Take 2 tablets (1,000 mg) by mouth daily (with dinner) (Patient taking differently: Take 1,000 mg by mouth every morning ) 180 tablet 3     metoprolol (LOPRESSOR) 10 mg/mL SUSP Take 100 mg by mouth 2 times daily       olopatadine HCl (PATADAY) 0.2 % SOLN Place 1 drop into both eyes daily as  needed For itchy eyes (Patient taking differently: Place 1 drop into both eyes as needed For itchy eyes) 1 Bottle 5     Pregabalin (LYRICA PO) Take 75 mg by mouth 3 times daily        ranitidine (ZANTAC) 150 MG tablet Take 150 mg by mouth 2 times daily       traMADol (ULTRAM) 50 MG tablet Take 50 mg by mouth as needed        triamcinolone (KENALOG) 0.1 % ointment Apply topically daily To legs under compression stockings for stasis dermatitis discoloration. 60 g 5     Elastic Bandages & Supports (JOBST KNEE HIGH COMPRESSION SM) MISC JOBST 20-30MMHG COMPRESSION SM MISC   To both legs during waking hours daily for leg swelling and venous stasis dermatitis. Do not sleep in stockings. 2 each 3     insulin pen needle (B-D U/F) 31G X 5 MM Use 5 time(s) per day.  Please dispense as BD Pen Needle Mini U/F 31G x 5  each 11     loratadine (CLARITIN) 10 MG tablet Take 10 mg by mouth as needed.       nitroGLYCERIN (NITROSTAT) 0.4 MG SL tablet Place 0.4 mg under the tongue every 5 minutes as needed.         Allergies  Allergies   Allergen Reactions     Dust Mites Other (See Comments)     Sneezing runny eyes and nose.     Food Allergy Formula Hives     Mountain Dew and Walnuts     Pollen Extract Other (See Comments)     Sneezing runny eyes and nose.       Social History  Social History     Socioeconomic History     Marital status: Single     Spouse name: Not on file     Number of children: Not on file     Years of education: Not on file     Highest education level: Not on file   Occupational History     Not on file   Social Needs     Financial resource strain: Not on file     Food insecurity:     Worry: Not on file     Inability: Not on file     Transportation needs:     Medical: Not on file     Non-medical: Not on file   Tobacco Use     Smoking status: Former Smoker     Packs/day: 1.50     Years: 45.00     Pack years: 67.50     Types: Cigarettes     Start date: 1/1/1968     Last attempt to quit: 3/6/2013     Years since  quittin.3     Smokeless tobacco: Never Used   Substance and Sexual Activity     Alcohol use: Not Currently     Drug use: Never     Sexual activity: Not Currently   Lifestyle     Physical activity:     Days per week: Not on file     Minutes per session: Not on file     Stress: Not on file   Relationships     Social connections:     Talks on phone: Not on file     Gets together: Not on file     Attends Quaker service: Not on file     Active member of club or organization: Not on file     Attends meetings of clubs or organizations: Not on file     Relationship status: Not on file     Intimate partner violence:     Fear of current or ex partner: Not on file     Emotionally abused: Not on file     Physically abused: Not on file     Forced sexual activity: Not on file   Other Topics Concern     Parent/sibling w/ CABG, MI or angioplasty before 65F 55M? Not Asked   Social History Narrative    Pt has three daughters and two sons, single.  Her daughter Court, see's her often at the Nursing Home ([facility]).  Moved into Nursing Home in 2011 after a hospitalization for ALY.  Moved from [place] in  to [facility]. Has 5 grandchildren.       Family History  Family History   Problem Relation Age of Onset     Hypertension Mother      Cerebrovascular Disease Mother      Glaucoma Father      Cancer Father      Diabetes Sister      Glaucoma Sister      Cancer - colorectal Other      Cerebrovascular Disease Other      Skin Cancer No family hx of      Melanoma No family hx of      Anesthesia Reaction No family hx of      Deep Vein Thrombosis (DVT) No family hx of        ROS/MED HX  The complete review of systems is negative other than noted in the HPI or here.  Patient denies recent illness, fever and respiratory infection during past month.  Pt denies steroid use during past year.    ENT/Pulmonary:     (+)tobacco use, Past use 67.5 pk yr hx, quit 2013 packs/day  , . .    Neurologic:     (+)CVA date:   with deficits- non-ambulatory, diminished use of hands,     Cardiovascular:     (+) Dyslipidemia, hypertension--CAD, -past MI,-stent,2009  1 . Taking blood thinners : . . . :. . Previous cardiac testing (see below)  METS/Exercise Tolerance: Comment: Wheelchair bound 1 - Eating, dressing   Hematologic:     (+) History of Transfusion no previous transfusion reaction -      Musculoskeletal: Comment: Pt reports diffuse arthritis, jose in hands        GI/Hepatic: Comment: On zantac    (+) GERD Asymptomatic on medication,       Renal/Genitourinary: Comment: Hospitalized in 2000 for UTI/pyelonephritis    (+) chronic renal disease, type: CRI, Pt does not require dialysis, Pt has no history of transplant,       Endo:     (+) type II DM Last HgA1c: 6.5 date: 5/24/19 Using insulin - not using insulin pump Diabetic complications: nephropathy neuropathy cardiac problems, Obesity, .      Psychiatric:     (+) psychiatric history depression      Infectious Disease:  - neg infectious disease ROS       Malignancy:   (+) Malignancy History of Other  Other CA endometrial hyperplasia Active status post         Other:    (+) No chance of pregnancy C-spine cleared: N/A, H/O Chronic Pain,other significant disability Wheelchair bound             PHYSICAL EXAM:   Mental Status/Neuro: A/A/O; Age Appropriate   Airway: Facies: Thick Neck  Mallampati: III  Mouth/Opening: Full  TM distance: > 6 cm  Neck ROM: Limited   Respiratory: Auscultation: CTAB     Resp. Rate: Normal     Resp. Effort: Normal      CV: Rhythm: Regular  Rate: Age appropriate  Heart: Normal Sounds   Comments:      Dental:  Dentures: Complete                Preop Vitals  BP Readings from Last 3 Encounters:   07/23/19 134/62   07/15/19 134/80   06/18/19 135/69    Pulse Readings from Last 3 Encounters:   07/23/19 78   07/15/19 70   06/18/19 68      Resp Readings from Last 3 Encounters:   07/23/19 18   06/18/19 18   08/21/14 12    SpO2 Readings from Last 3 Encounters:   07/23/19 100%  "  07/15/19 100%   06/18/19 100%      Temp Readings from Last 1 Encounters:   07/23/19 97.7  F (36.5  C) (Oral)    Ht Readings from Last 1 Encounters:   07/23/19 1.6 m (5' 3\")      Wt Readings from Last 1 Encounters:   07/23/19 98.9 kg (218 lb)    Estimated body mass index is 38.62 kg/m  as calculated from the following:    Height as of this encounter: 1.6 m (5' 3\").    Weight as of this encounter: 98.9 kg (218 lb).       Temp: 97.7  F (36.5  C) Temp src: Oral BP: 134/62 Pulse: 78   Resp: 18 SpO2: 100 %         218 lbs 0 oz  5' 3\"   Body mass index is 38.62 kg/m .    Physical Exam  Constitutional: Awake, alert, cooperative, no apparent distress, and appears stated age. Sitting in wheelchair.  Eyes: Pupils equal, round and reactive to light, extra ocular muscles intact, sclera clear, conjunctiva normal.  HENT: Normocephalic, oral pharynx with moist mucus membranes, good dentition. No goiter appreciated. Upper & lower dentures in place.  Respiratory: Clear to auscultation bilaterally, no crackles or wheezing. No SOB when supine.  Cardiovascular: Distant heart sounds, regular rate and rhythm, normal S1 and S2, and no murmur noted.  Carotids +2, no bruits. Wearing compression stockings. Palpable pulses to radial, DP and PT arteries.   GI: Hypoactive bowel sounds, soft, non-distended, non-tender, no masses palpated, no hepatomegaly.    Lymph/Hematologic: No cervical lymphadenopathy and no supraclavicular lymphadenopathy.  Genitourinary:  deferred, wearing diaper.  Skin: Warm and dry.  No rashes at anticipated surgical site.   Musculoskeletal: Limited extension ROM of neck. There is no redness, warmth, or swelling of the joints. Gross motor strength is slightly diminished in RUE/RLE, UE stronger than LE.  Neurologic: Awake, alert, oriented to name, place and time. Cranial nerves II-XII are grossly intact. Gait not assessed, non-ambulatory.   Neuropsychiatric: Calm, cooperative. Normal/flat affect. Answers questions " appropriately, follows commands w/o difficulty.    PRIOR LABS/DIAGNOSTIC STUDIES:  All labs and imaging personally reviewed    EKG 7/15/19:  Sinus rhythm  Normal ECG  When compared with ECG of 28-MAR-2013 11:41,  Inverted T waves have replaced nonspecific T wave abnormality in Anterior leads  Ventricular rate 71 bpm    NM STRESS TEST 4/25/18:    The pharmacologic nuclear stress test is negative for inducible   myocardial ischemia or infarction.    The left ventricular ejection fraction is 61%.    There is no prior study available.                                                  ECHOCARDIOGRAM 3/28/13:  Interpretation Summary  Technically difficult study. Poor acoustic windows. Global and regional left   ventricular function is normal with an EF of 55-60%. Pulmonary artery   systolic pressure cannot be assessed. The inferior vena cava  is normal.    Labs today: (personally reviewed)  BMP, CBC, BNP, A1c, T&S    Outside records reviewed from: Care Everywhere    ASSESSMENT and PLAN  Cristal Preciado is a 69 year old female scheduled to undergo DaVinci Assisted Total Laparoscopic Hysterectomy, Removal Of Both Tubes And Ovaries with Linh Cardoso MD on 8/5/19 at [facility] for treatment of Endometrial hyperplasia.     She has the following specific operative considerations:      - Pt has had prior anesthetic. Type: MAC - no complications per pt  - Anesthesia considerations:  Refer to PAC assessment in anesthesia records  - Risk of PONV score = 2.  If > 2, anti-emetic intervention recommended.    CARDIAC: METS 1,wheelchair bound. Requires a lift for transfers, can stand for only a few seconds with assistance. Cannot sit up independently due to instability of her core/trunk. Is able to do exercises in her wheelchair.          - RCRI : High risk surgery (>5% cardiac complication risk) Coronary Artery Disease (MI, positive stress test, angina, Qs on EKG) Cerebrovascular Disease  (TIA or CVA) Diabetes Mellitus (on Insulin).  11% risk of major adverse cardiac event.    - MI in 2009, s/p RCA stent    - HTN, taking chlorthalidone, lisinopril, metoprolol   * Saw cardiologist 7/15/19, cleared for surgery w/ quoted 11% periop risk for MACE *    - NM Stress 4/2018:  EF 61%, no ischemia    PULMONARY:    - ONIEL 4/8 = intermediate risk    - Former smoker, 67.5 pk yr hx, quit 2013    -  no inhaler use    NEURO/PSYCH:    - CVA in 2011 w/ right hemiparesis    GI:  GERD, taking zantac    /RENAL: Renal insufficiency, creatinine 1.14 on 5/2019, 1.46 on 1/2019     ENDO: BMI 38    - DM2, A1c on 5/24/19 was 6.5, on insulin & oral meds    HEME: VTE risk: 3%  - On ASA 81 mg, will stop 7 days prior    ORTHO: limited neck ROM, no TMJ    - Non-ambulatory, right hemiparesis, Decreased mobility: Recommend that assistive devices be utilized during transfers and repositioning in order to prevent injury.      Patient was discussed with Dr Zamorano. Patient is optimized for the proposed procedure, provided labs today are within acceptable range. No further diagnostic evaluation is needed.    Arrival time, NPO, shower and medication instructions provided by nursing staff today.  Preparing For Your Surgery handout given.    Haley Lilly PA-C  Preoperative Assessment Center  [facility]  [facility]  Phone: [phone]  Fax: [phone]

## 2019-08-01 ENCOUNTER — COMMUNICATION - HEALTHEAST (OUTPATIENT)
Dept: GERIATRICS | Facility: CLINIC | Age: 69
End: 2019-08-01

## 2019-08-02 ENCOUNTER — COMMUNICATION - HEALTHEAST (OUTPATIENT)
Dept: GERIATRICS | Facility: CLINIC | Age: 69
End: 2019-08-02

## 2019-08-02 ENCOUNTER — TELEPHONE (OUTPATIENT)
Dept: ENDOCRINOLOGY | Facility: CLINIC | Age: 69
End: 2019-08-02

## 2019-08-02 NOTE — TELEPHONE ENCOUNTER
[facility] Call Center    Phone Message    May a detailed message be left on voicemail: no    Reason for Call: Other: [facility] called and wanted to update [place] that 8/1/19 at 4:30pm that patient's blood sugar was 62, and then at bedtime it was 100. At 7:30am 8/2/19 her blood sugar was 186. They do not need a call back.     Action Taken: Message routed to:  [facility] ([facility]): Michelle

## 2019-08-05 ENCOUNTER — SURGERY (OUTPATIENT)
Age: 69
End: 2019-08-05
Payer: COMMERCIAL

## 2019-08-05 ENCOUNTER — ANESTHESIA (OUTPATIENT)
Dept: SURGERY | Facility: CLINIC | Age: 69
DRG: 742 | End: 2019-08-05
Payer: COMMERCIAL

## 2019-08-05 ENCOUNTER — HOSPITAL ENCOUNTER (INPATIENT)
Facility: CLINIC | Age: 69
LOS: 1 days | Discharge: NURSING, REHAB OR LONG-TERM CARE | DRG: 742 | End: 2019-08-06
Attending: OBSTETRICS & GYNECOLOGY | Admitting: OBSTETRICS & GYNECOLOGY
Payer: COMMERCIAL

## 2019-08-05 DIAGNOSIS — N39.0 URINARY TRACT INFECTION WITHOUT HEMATURIA, SITE UNSPECIFIED: ICD-10-CM

## 2019-08-05 DIAGNOSIS — N30.01 ACUTE CYSTITIS WITH HEMATURIA: ICD-10-CM

## 2019-08-05 DIAGNOSIS — Z90.710 S/P HYSTERECTOMY: Primary | ICD-10-CM

## 2019-08-05 LAB
ABO + RH BLD: NORMAL
ABO + RH BLD: NORMAL
BLD GP AB SCN SERPL QL: NORMAL
BLOOD BANK CMNT PATIENT-IMP: NORMAL
BLOOD BANK CMNT PATIENT-IMP: NORMAL
CREAT SERPL-MCNC: 1.13 MG/DL (ref 0.52–1.04)
GFR SERPL CREATININE-BSD FRML MDRD: 50 ML/MIN/{1.73_M2}
GLUCOSE BLDC GLUCOMTR-MCNC: 166 MG/DL (ref 70–99)
GLUCOSE BLDC GLUCOMTR-MCNC: 210 MG/DL (ref 70–99)
GLUCOSE BLDC GLUCOMTR-MCNC: 247 MG/DL (ref 70–99)
GLUCOSE BLDC GLUCOMTR-MCNC: 253 MG/DL (ref 70–99)
GLUCOSE BLDC GLUCOMTR-MCNC: 257 MG/DL (ref 70–99)
GLUCOSE BLDC GLUCOMTR-MCNC: 259 MG/DL (ref 70–99)
GLUCOSE BLDC GLUCOMTR-MCNC: 279 MG/DL (ref 70–99)
GLUCOSE BLDC GLUCOMTR-MCNC: 282 MG/DL (ref 70–99)
GLUCOSE BLDC GLUCOMTR-MCNC: 283 MG/DL (ref 70–99)
GLUCOSE BLDC GLUCOMTR-MCNC: 308 MG/DL (ref 70–99)
GLUCOSE BLDC GLUCOMTR-MCNC: 314 MG/DL (ref 70–99)
PHOSPHATE SERPL-MCNC: 3.5 MG/DL (ref 2.5–4.5)
POTASSIUM SERPL-SCNC: 5.1 MMOL/L (ref 3.4–5.3)
SPECIMEN EXP DATE BLD: NORMAL

## 2019-08-05 PROCEDURE — 25000132 ZZH RX MED GY IP 250 OP 250 PS 637: Performed by: STUDENT IN AN ORGANIZED HEALTH CARE EDUCATION/TRAINING PROGRAM

## 2019-08-05 PROCEDURE — 25000125 ZZHC RX 250: Performed by: NURSE ANESTHETIST, CERTIFIED REGISTERED

## 2019-08-05 PROCEDURE — 25800030 ZZH RX IP 258 OP 636: Performed by: ANESTHESIOLOGY

## 2019-08-05 PROCEDURE — 36416 COLLJ CAPILLARY BLOOD SPEC: CPT | Performed by: OBSTETRICS & GYNECOLOGY

## 2019-08-05 PROCEDURE — 25800030 ZZH RX IP 258 OP 636: Performed by: NURSE ANESTHETIST, CERTIFIED REGISTERED

## 2019-08-05 PROCEDURE — 40000171 ZZH STATISTIC PRE-PROCEDURE ASSESSMENT III: Performed by: OBSTETRICS & GYNECOLOGY

## 2019-08-05 PROCEDURE — 25000128 H RX IP 250 OP 636: Performed by: OBSTETRICS & GYNECOLOGY

## 2019-08-05 PROCEDURE — 25000564 ZZH DESFLURANE, EA 15 MIN: Performed by: OBSTETRICS & GYNECOLOGY

## 2019-08-05 PROCEDURE — 25000128 H RX IP 250 OP 636: Performed by: STUDENT IN AN ORGANIZED HEALTH CARE EDUCATION/TRAINING PROGRAM

## 2019-08-05 PROCEDURE — 12000001 ZZH R&B MED SURG/OB UMMC

## 2019-08-05 PROCEDURE — 25000125 ZZHC RX 250: Performed by: ANESTHESIOLOGY

## 2019-08-05 PROCEDURE — 25000125 ZZHC RX 250: Performed by: OBSTETRICS & GYNECOLOGY

## 2019-08-05 PROCEDURE — 84100 ASSAY OF PHOSPHORUS: CPT | Performed by: OBSTETRICS & GYNECOLOGY

## 2019-08-05 PROCEDURE — 71000015 ZZH RECOVERY PHASE 1 LEVEL 2 EA ADDTL HR: Performed by: OBSTETRICS & GYNECOLOGY

## 2019-08-05 PROCEDURE — 25000128 H RX IP 250 OP 636: Performed by: NURSE ANESTHETIST, CERTIFIED REGISTERED

## 2019-08-05 PROCEDURE — 36000086 ZZH SURGERY LEVEL 8 1ST 30 MIN UMMC: Performed by: OBSTETRICS & GYNECOLOGY

## 2019-08-05 PROCEDURE — 25000125 ZZHC RX 250: Performed by: STUDENT IN AN ORGANIZED HEALTH CARE EDUCATION/TRAINING PROGRAM

## 2019-08-05 PROCEDURE — 25800030 ZZH RX IP 258 OP 636: Performed by: STUDENT IN AN ORGANIZED HEALTH CARE EDUCATION/TRAINING PROGRAM

## 2019-08-05 PROCEDURE — 25000128 H RX IP 250 OP 636: Performed by: ANESTHESIOLOGY

## 2019-08-05 PROCEDURE — 84132 ASSAY OF SERUM POTASSIUM: CPT | Performed by: ANESTHESIOLOGY

## 2019-08-05 PROCEDURE — 88307 TISSUE EXAM BY PATHOLOGIST: CPT | Performed by: OBSTETRICS & GYNECOLOGY

## 2019-08-05 PROCEDURE — 40000014 ZZH STATISTIC ARTERIAL MONITORING DAILY

## 2019-08-05 PROCEDURE — 00000146 ZZHCL STATISTIC GLUCOSE BY METER IP

## 2019-08-05 PROCEDURE — 37000008 ZZH ANESTHESIA TECHNICAL FEE, 1ST 30 MIN: Performed by: OBSTETRICS & GYNECOLOGY

## 2019-08-05 PROCEDURE — 0UT7FZZ RESECTION OF BILATERAL FALLOPIAN TUBES, VIA NATURAL OR ARTIFICIAL OPENING WITH PERCUTANEOUS ENDOSCOPIC ASSISTANCE: ICD-10-PCS | Performed by: OBSTETRICS & GYNECOLOGY

## 2019-08-05 PROCEDURE — 37000009 ZZH ANESTHESIA TECHNICAL FEE, EACH ADDTL 15 MIN: Performed by: OBSTETRICS & GYNECOLOGY

## 2019-08-05 PROCEDURE — 27210794 ZZH OR GENERAL SUPPLY STERILE: Performed by: OBSTETRICS & GYNECOLOGY

## 2019-08-05 PROCEDURE — 0UT2FZZ RESECTION OF BILATERAL OVARIES, VIA NATURAL OR ARTIFICIAL OPENING WITH PERCUTANEOUS ENDOSCOPIC ASSISTANCE: ICD-10-PCS | Performed by: OBSTETRICS & GYNECOLOGY

## 2019-08-05 PROCEDURE — 25000132 ZZH RX MED GY IP 250 OP 250 PS 637: Performed by: OBSTETRICS & GYNECOLOGY

## 2019-08-05 PROCEDURE — 0UT9FZZ RESECTION OF UTERUS, VIA NATURAL OR ARTIFICIAL OPENING WITH PERCUTANEOUS ENDOSCOPIC ASSISTANCE: ICD-10-PCS | Performed by: OBSTETRICS & GYNECOLOGY

## 2019-08-05 PROCEDURE — 40000275 ZZH STATISTIC RCP TIME EA 10 MIN

## 2019-08-05 PROCEDURE — 25000132 ZZH RX MED GY IP 250 OP 250 PS 637: Performed by: ANESTHESIOLOGY

## 2019-08-05 PROCEDURE — 82565 ASSAY OF CREATININE: CPT | Performed by: ANESTHESIOLOGY

## 2019-08-05 PROCEDURE — 36000088 ZZH SURGERY LEVEL 8 EA 15 ADDTL MIN - UMMC: Performed by: OBSTETRICS & GYNECOLOGY

## 2019-08-05 PROCEDURE — 58571 TLH W/T/O 250 G OR LESS: CPT | Mod: GC | Performed by: OBSTETRICS & GYNECOLOGY

## 2019-08-05 PROCEDURE — 71000014 ZZH RECOVERY PHASE 1 LEVEL 2 FIRST HR: Performed by: OBSTETRICS & GYNECOLOGY

## 2019-08-05 PROCEDURE — 8E0W4CZ ROBOTIC ASSISTED PROCEDURE OF TRUNK REGION, PERCUTANEOUS ENDOSCOPIC APPROACH: ICD-10-PCS | Performed by: OBSTETRICS & GYNECOLOGY

## 2019-08-05 PROCEDURE — 88305 TISSUE EXAM BY PATHOLOGIST: CPT | Performed by: OBSTETRICS & GYNECOLOGY

## 2019-08-05 PROCEDURE — 25000566 ZZH SEVOFLURANE, EA 15 MIN: Performed by: OBSTETRICS & GYNECOLOGY

## 2019-08-05 RX ORDER — ASPIRIN 81 MG/1
81 TABLET, CHEWABLE ORAL DAILY
Status: DISCONTINUED | OUTPATIENT
Start: 2019-08-06 | End: 2019-08-06 | Stop reason: HOSPADM

## 2019-08-05 RX ORDER — DEXTROSE MONOHYDRATE 25 G/50ML
25-50 INJECTION, SOLUTION INTRAVENOUS
Status: DISCONTINUED | OUTPATIENT
Start: 2019-08-05 | End: 2019-08-06 | Stop reason: HOSPADM

## 2019-08-05 RX ORDER — AMOXICILLIN 250 MG
1 CAPSULE ORAL 2 TIMES DAILY
Status: DISCONTINUED | OUTPATIENT
Start: 2019-08-05 | End: 2019-08-06 | Stop reason: HOSPADM

## 2019-08-05 RX ORDER — LIDOCAINE 40 MG/G
CREAM TOPICAL
Status: DISCONTINUED | OUTPATIENT
Start: 2019-08-05 | End: 2019-08-06 | Stop reason: HOSPADM

## 2019-08-05 RX ORDER — ONDANSETRON 2 MG/ML
4 INJECTION INTRAMUSCULAR; INTRAVENOUS EVERY 30 MIN PRN
Status: DISCONTINUED | OUTPATIENT
Start: 2019-08-05 | End: 2019-08-05 | Stop reason: HOSPADM

## 2019-08-05 RX ORDER — DIPHENHYDRAMINE HCL 12.5MG/5ML
12.5 LIQUID (ML) ORAL EVERY 6 HOURS PRN
Status: DISCONTINUED | OUTPATIENT
Start: 2019-08-05 | End: 2019-08-06 | Stop reason: HOSPADM

## 2019-08-05 RX ORDER — OXYCODONE HYDROCHLORIDE 5 MG/1
5-10 TABLET ORAL EVERY 4 HOURS PRN
Status: DISCONTINUED | OUTPATIENT
Start: 2019-08-05 | End: 2019-08-06 | Stop reason: HOSPADM

## 2019-08-05 RX ORDER — DEXTROSE MONOHYDRATE 25 G/50ML
25-50 INJECTION, SOLUTION INTRAVENOUS
Status: DISCONTINUED | OUTPATIENT
Start: 2019-08-05 | End: 2019-08-05

## 2019-08-05 RX ORDER — LIDOCAINE HYDROCHLORIDE 20 MG/ML
INJECTION, SOLUTION INFILTRATION; PERINEURAL PRN
Status: DISCONTINUED | OUTPATIENT
Start: 2019-08-05 | End: 2019-08-05

## 2019-08-05 RX ORDER — ETOMIDATE 2 MG/ML
INJECTION INTRAVENOUS PRN
Status: DISCONTINUED | OUTPATIENT
Start: 2019-08-05 | End: 2019-08-05

## 2019-08-05 RX ORDER — HYDROMORPHONE HYDROCHLORIDE 1 MG/ML
.3-.5 INJECTION, SOLUTION INTRAMUSCULAR; INTRAVENOUS; SUBCUTANEOUS EVERY 5 MIN PRN
Status: DISCONTINUED | OUTPATIENT
Start: 2019-08-05 | End: 2019-08-05 | Stop reason: HOSPADM

## 2019-08-05 RX ORDER — BUPIVACAINE HYDROCHLORIDE 2.5 MG/ML
INJECTION, SOLUTION EPIDURAL; INFILTRATION; INTRACAUDAL PRN
Status: DISCONTINUED | OUTPATIENT
Start: 2019-08-05 | End: 2019-08-05

## 2019-08-05 RX ORDER — FENTANYL CITRATE 50 UG/ML
INJECTION, SOLUTION INTRAMUSCULAR; INTRAVENOUS PRN
Status: DISCONTINUED | OUTPATIENT
Start: 2019-08-05 | End: 2019-08-05

## 2019-08-05 RX ORDER — FENTANYL CITRATE 50 UG/ML
25-50 INJECTION, SOLUTION INTRAMUSCULAR; INTRAVENOUS
Status: DISCONTINUED | OUTPATIENT
Start: 2019-08-05 | End: 2019-08-05 | Stop reason: HOSPADM

## 2019-08-05 RX ORDER — GABAPENTIN 600 MG/1
600 TABLET ORAL 3 TIMES DAILY
Status: DISCONTINUED | OUTPATIENT
Start: 2019-08-05 | End: 2019-08-06 | Stop reason: HOSPADM

## 2019-08-05 RX ORDER — OXYMETAZOLINE HYDROCHLORIDE 0.05 G/100ML
SPRAY NASAL PRN
Status: DISCONTINUED | OUTPATIENT
Start: 2019-08-05 | End: 2019-08-05

## 2019-08-05 RX ORDER — HYDRALAZINE HYDROCHLORIDE 20 MG/ML
INJECTION INTRAMUSCULAR; INTRAVENOUS PRN
Status: DISCONTINUED | OUTPATIENT
Start: 2019-08-05 | End: 2019-08-05

## 2019-08-05 RX ORDER — PREGABALIN 75 MG/1
75 CAPSULE ORAL 3 TIMES DAILY
Status: DISCONTINUED | OUTPATIENT
Start: 2019-08-05 | End: 2019-08-06 | Stop reason: HOSPADM

## 2019-08-05 RX ORDER — ACETAMINOPHEN 325 MG/1
975 TABLET ORAL ONCE
Status: DISCONTINUED | OUTPATIENT
Start: 2019-08-05 | End: 2019-08-05

## 2019-08-05 RX ORDER — ONDANSETRON 4 MG/1
4 TABLET, ORALLY DISINTEGRATING ORAL EVERY 8 HOURS
Status: DISCONTINUED | OUTPATIENT
Start: 2019-08-05 | End: 2019-08-06 | Stop reason: HOSPADM

## 2019-08-05 RX ORDER — LIDOCAINE 40 MG/G
CREAM TOPICAL
Status: DISCONTINUED | OUTPATIENT
Start: 2019-08-05 | End: 2019-08-05 | Stop reason: HOSPADM

## 2019-08-05 RX ORDER — SODIUM CHLORIDE, SODIUM LACTATE, POTASSIUM CHLORIDE, CALCIUM CHLORIDE 600; 310; 30; 20 MG/100ML; MG/100ML; MG/100ML; MG/100ML
INJECTION, SOLUTION INTRAVENOUS CONTINUOUS PRN
Status: DISCONTINUED | OUTPATIENT
Start: 2019-08-05 | End: 2019-08-05

## 2019-08-05 RX ORDER — NALOXONE HYDROCHLORIDE 0.4 MG/ML
.1-.4 INJECTION, SOLUTION INTRAMUSCULAR; INTRAVENOUS; SUBCUTANEOUS
Status: DISCONTINUED | OUTPATIENT
Start: 2019-08-06 | End: 2019-08-06 | Stop reason: HOSPADM

## 2019-08-05 RX ORDER — DORZOLAMIDE HYDROCHLORIDE AND TIMOLOL MALEATE 20; 5 MG/ML; MG/ML
1 SOLUTION/ DROPS OPHTHALMIC 2 TIMES DAILY
Status: DISCONTINUED | OUTPATIENT
Start: 2019-08-05 | End: 2019-08-06 | Stop reason: HOSPADM

## 2019-08-05 RX ORDER — AMOXICILLIN 250 MG
2 CAPSULE ORAL 2 TIMES DAILY
Status: DISCONTINUED | OUTPATIENT
Start: 2019-08-05 | End: 2019-08-06 | Stop reason: HOSPADM

## 2019-08-05 RX ORDER — NICOTINE POLACRILEX 4 MG
15-30 LOZENGE BUCCAL
Status: DISCONTINUED | OUTPATIENT
Start: 2019-08-05 | End: 2019-08-06 | Stop reason: HOSPADM

## 2019-08-05 RX ORDER — SODIUM CHLORIDE, SODIUM LACTATE, POTASSIUM CHLORIDE, CALCIUM CHLORIDE 600; 310; 30; 20 MG/100ML; MG/100ML; MG/100ML; MG/100ML
INJECTION, SOLUTION INTRAVENOUS CONTINUOUS
Status: DISCONTINUED | OUTPATIENT
Start: 2019-08-05 | End: 2019-08-05 | Stop reason: HOSPADM

## 2019-08-05 RX ORDER — LATANOPROST 50 UG/ML
1 SOLUTION/ DROPS OPHTHALMIC AT BEDTIME
Status: DISCONTINUED | OUTPATIENT
Start: 2019-08-05 | End: 2019-08-06 | Stop reason: HOSPADM

## 2019-08-05 RX ORDER — PREGABALIN 75 MG/1
75 CAPSULE ORAL ONCE
Status: DISCONTINUED | OUTPATIENT
Start: 2019-08-05 | End: 2019-08-05 | Stop reason: HOSPADM

## 2019-08-05 RX ORDER — ONDANSETRON 4 MG/1
4 TABLET, ORALLY DISINTEGRATING ORAL EVERY 8 HOURS PRN
Status: DISCONTINUED | OUTPATIENT
Start: 2019-08-06 | End: 2019-08-06 | Stop reason: HOSPADM

## 2019-08-05 RX ORDER — NALOXONE HYDROCHLORIDE 0.4 MG/ML
.1-.4 INJECTION, SOLUTION INTRAMUSCULAR; INTRAVENOUS; SUBCUTANEOUS
Status: DISCONTINUED | OUTPATIENT
Start: 2019-08-05 | End: 2019-08-06 | Stop reason: HOSPADM

## 2019-08-05 RX ORDER — DEXAMETHASONE SODIUM PHOSPHATE 4 MG/ML
INJECTION, SOLUTION INTRA-ARTICULAR; INTRALESIONAL; INTRAMUSCULAR; INTRAVENOUS; SOFT TISSUE PRN
Status: DISCONTINUED | OUTPATIENT
Start: 2019-08-05 | End: 2019-08-05

## 2019-08-05 RX ORDER — DULOXETIN HYDROCHLORIDE 60 MG/1
60 CAPSULE, DELAYED RELEASE ORAL EVERY MORNING
Status: DISCONTINUED | OUTPATIENT
Start: 2019-08-06 | End: 2019-08-06 | Stop reason: HOSPADM

## 2019-08-05 RX ORDER — ONDANSETRON 2 MG/ML
INJECTION INTRAMUSCULAR; INTRAVENOUS PRN
Status: DISCONTINUED | OUTPATIENT
Start: 2019-08-05 | End: 2019-08-05

## 2019-08-05 RX ORDER — BISACODYL 10 MG
10 SUPPOSITORY, RECTAL RECTAL DAILY
Status: DISCONTINUED | OUTPATIENT
Start: 2019-08-06 | End: 2019-08-06 | Stop reason: HOSPADM

## 2019-08-05 RX ORDER — METOPROLOL TARTRATE 100 MG
100 TABLET ORAL 2 TIMES DAILY
Status: DISCONTINUED | OUTPATIENT
Start: 2019-08-05 | End: 2019-08-06 | Stop reason: HOSPADM

## 2019-08-05 RX ORDER — NICOTINE POLACRILEX 4 MG
15-30 LOZENGE BUCCAL
Status: DISCONTINUED | OUTPATIENT
Start: 2019-08-05 | End: 2019-08-05

## 2019-08-05 RX ORDER — PROPOFOL 10 MG/ML
INJECTION, EMULSION INTRAVENOUS PRN
Status: DISCONTINUED | OUTPATIENT
Start: 2019-08-05 | End: 2019-08-05

## 2019-08-05 RX ORDER — DIPHENHYDRAMINE HYDROCHLORIDE 50 MG/ML
12.5 INJECTION INTRAMUSCULAR; INTRAVENOUS EVERY 6 HOURS PRN
Status: DISCONTINUED | OUTPATIENT
Start: 2019-08-05 | End: 2019-08-06 | Stop reason: HOSPADM

## 2019-08-05 RX ORDER — ACETAMINOPHEN 325 MG/1
650 TABLET ORAL EVERY 6 HOURS
Status: DISCONTINUED | OUTPATIENT
Start: 2019-08-05 | End: 2019-08-06 | Stop reason: HOSPADM

## 2019-08-05 RX ORDER — ONDANSETRON 4 MG/1
4 TABLET, ORALLY DISINTEGRATING ORAL EVERY 30 MIN PRN
Status: DISCONTINUED | OUTPATIENT
Start: 2019-08-05 | End: 2019-08-05 | Stop reason: HOSPADM

## 2019-08-05 RX ORDER — CEFAZOLIN SODIUM 1 G/3ML
1 INJECTION, POWDER, FOR SOLUTION INTRAMUSCULAR; INTRAVENOUS SEE ADMIN INSTRUCTIONS
Status: DISCONTINUED | OUTPATIENT
Start: 2019-08-05 | End: 2019-08-05 | Stop reason: HOSPADM

## 2019-08-05 RX ORDER — CEFAZOLIN SODIUM 2 G/100ML
2 INJECTION, SOLUTION INTRAVENOUS
Status: COMPLETED | OUTPATIENT
Start: 2019-08-05 | End: 2019-08-05

## 2019-08-05 RX ORDER — FLUMAZENIL 0.1 MG/ML
0.2 INJECTION, SOLUTION INTRAVENOUS
Status: DISCONTINUED | OUTPATIENT
Start: 2019-08-05 | End: 2019-08-05 | Stop reason: HOSPADM

## 2019-08-05 RX ORDER — NALOXONE HYDROCHLORIDE 0.4 MG/ML
.1-.4 INJECTION, SOLUTION INTRAMUSCULAR; INTRAVENOUS; SUBCUTANEOUS
Status: DISCONTINUED | OUTPATIENT
Start: 2019-08-05 | End: 2019-08-05 | Stop reason: HOSPADM

## 2019-08-05 RX ORDER — SIMETHICONE 80 MG
80 TABLET,CHEWABLE ORAL 4 TIMES DAILY
Status: DISCONTINUED | OUTPATIENT
Start: 2019-08-05 | End: 2019-08-06 | Stop reason: HOSPADM

## 2019-08-05 RX ADMIN — OXYMETAZOLINE HYDROCHLORIDE 1 SPRAY: 0.05 SPRAY NASAL at 13:13

## 2019-08-05 RX ADMIN — HYDROMORPHONE HYDROCHLORIDE 0.5 MG: 1 INJECTION, SOLUTION INTRAMUSCULAR; INTRAVENOUS; SUBCUTANEOUS at 14:15

## 2019-08-05 RX ADMIN — ETOMIDATE 10 MG: 2 INJECTION INTRAVENOUS at 11:17

## 2019-08-05 RX ADMIN — PREGABALIN 75 MG: 75 CAPSULE ORAL at 20:47

## 2019-08-05 RX ADMIN — SIMETHICONE CHEW TAB 80 MG 80 MG: 80 TABLET ORAL at 20:48

## 2019-08-05 RX ADMIN — FENTANYL CITRATE 50 MCG: 50 INJECTION, SOLUTION INTRAMUSCULAR; INTRAVENOUS at 12:23

## 2019-08-05 RX ADMIN — FENTANYL CITRATE 50 MCG: 50 INJECTION, SOLUTION INTRAMUSCULAR; INTRAVENOUS at 12:43

## 2019-08-05 RX ADMIN — DEXAMETHASONE SODIUM PHOSPHATE 6 MG: 4 INJECTION, SOLUTION INTRA-ARTICULAR; INTRALESIONAL; INTRAMUSCULAR; INTRAVENOUS; SOFT TISSUE at 11:57

## 2019-08-05 RX ADMIN — FENTANYL CITRATE 50 MCG: 50 INJECTION INTRAMUSCULAR; INTRAVENOUS at 13:48

## 2019-08-05 RX ADMIN — PROPOFOL 100 MG: 10 INJECTION, EMULSION INTRAVENOUS at 11:17

## 2019-08-05 RX ADMIN — ONDANSETRON 4 MG: 2 INJECTION INTRAMUSCULAR; INTRAVENOUS at 13:18

## 2019-08-05 RX ADMIN — CEFAZOLIN SODIUM 2 G: 2 INJECTION, SOLUTION INTRAVENOUS at 11:47

## 2019-08-05 RX ADMIN — LIDOCAINE HYDROCHLORIDE 100 MG: 20 INJECTION, SOLUTION INFILTRATION; PERINEURAL at 11:17

## 2019-08-05 RX ADMIN — FENTANYL CITRATE 50 MCG: 50 INJECTION, SOLUTION INTRAMUSCULAR; INTRAVENOUS at 11:43

## 2019-08-05 RX ADMIN — SODIUM CHLORIDE, POTASSIUM CHLORIDE, SODIUM LACTATE AND CALCIUM CHLORIDE: 600; 310; 30; 20 INJECTION, SOLUTION INTRAVENOUS at 14:24

## 2019-08-05 RX ADMIN — HUMAN INSULIN 5 UNITS: 100 INJECTION, SOLUTION SUBCUTANEOUS at 14:24

## 2019-08-05 RX ADMIN — HUMAN INSULIN 16 UNITS/HR: 100 INJECTION, SOLUTION SUBCUTANEOUS at 22:34

## 2019-08-05 RX ADMIN — DEXMEDETOMIDINE HYDROCHLORIDE 40 MCG: 100 INJECTION, SOLUTION INTRAVENOUS at 10:57

## 2019-08-05 RX ADMIN — HUMAN INSULIN 10 UNITS/HR: 100 INJECTION, SOLUTION SUBCUTANEOUS at 20:41

## 2019-08-05 RX ADMIN — DEXAMETHASONE SODIUM PHOSPHATE 2 MG: 4 INJECTION, SOLUTION INTRA-ARTICULAR; INTRALESIONAL; INTRAMUSCULAR; INTRAVENOUS; SOFT TISSUE at 10:57

## 2019-08-05 RX ADMIN — FENTANYL CITRATE 50 MCG: 50 INJECTION INTRAMUSCULAR; INTRAVENOUS at 14:00

## 2019-08-05 RX ADMIN — BUPIVACAINE HYDROCHLORIDE 60 ML: 2.5 INJECTION, SOLUTION EPIDURAL; INFILTRATION; INTRACAUDAL; PERINEURAL at 10:57

## 2019-08-05 RX ADMIN — FENTANYL CITRATE 50 MCG: 50 INJECTION, SOLUTION INTRAMUSCULAR; INTRAVENOUS at 11:17

## 2019-08-05 RX ADMIN — SODIUM CHLORIDE, POTASSIUM CHLORIDE, SODIUM LACTATE AND CALCIUM CHLORIDE: 600; 310; 30; 20 INJECTION, SOLUTION INTRAVENOUS at 11:04

## 2019-08-05 RX ADMIN — HYDRALAZINE HYDROCHLORIDE 5 MG: 20 INJECTION INTRAMUSCULAR; INTRAVENOUS at 12:01

## 2019-08-05 RX ADMIN — GABAPENTIN 600 MG: 600 TABLET, FILM COATED ORAL at 20:48

## 2019-08-05 RX ADMIN — ROCURONIUM BROMIDE 20 MG: 10 INJECTION INTRAVENOUS at 12:12

## 2019-08-05 RX ADMIN — SENNOSIDES AND DOCUSATE SODIUM 1 TABLET: 8.6; 5 TABLET ORAL at 20:48

## 2019-08-05 RX ADMIN — ACETAMINOPHEN 650 MG: 325 TABLET, FILM COATED ORAL at 18:45

## 2019-08-05 RX ADMIN — RANITIDINE 150 MG: 150 TABLET ORAL at 20:48

## 2019-08-05 RX ADMIN — METOPROLOL TARTRATE 100 MG: 100 TABLET, FILM COATED ORAL at 20:47

## 2019-08-05 RX ADMIN — DORZOLAMIDE HYDROCHLORIDE AND TIMOLOL MALEATE 1 DROP: 20; 5 SOLUTION/ DROPS OPHTHALMIC at 20:45

## 2019-08-05 RX ADMIN — HYDRALAZINE HYDROCHLORIDE 5 MG: 20 INJECTION INTRAMUSCULAR; INTRAVENOUS at 12:25

## 2019-08-05 RX ADMIN — HYDROMORPHONE HYDROCHLORIDE 0.5 MG: 1 INJECTION, SOLUTION INTRAMUSCULAR; INTRAVENOUS; SUBCUTANEOUS at 15:01

## 2019-08-05 RX ADMIN — ROCURONIUM BROMIDE 50 MG: 10 INJECTION INTRAVENOUS at 11:36

## 2019-08-05 RX ADMIN — HYDRALAZINE HYDROCHLORIDE 5 MG: 20 INJECTION INTRAMUSCULAR; INTRAVENOUS at 12:41

## 2019-08-05 RX ADMIN — FENTANYL CITRATE 25 MCG: 50 INJECTION INTRAMUSCULAR; INTRAVENOUS at 10:51

## 2019-08-05 RX ADMIN — SUGAMMADEX 200 MG: 100 INJECTION, SOLUTION INTRAVENOUS at 13:25

## 2019-08-05 RX ADMIN — SODIUM CHLORIDE, POTASSIUM CHLORIDE, SODIUM LACTATE AND CALCIUM CHLORIDE: 600; 310; 30; 20 INJECTION, SOLUTION INTRAVENOUS at 11:50

## 2019-08-05 RX ADMIN — LATANOPROST 1 DROP: 50 SOLUTION OPHTHALMIC at 22:44

## 2019-08-05 RX ADMIN — HUMAN INSULIN 4 UNITS/HR: 100 INJECTION, SOLUTION SUBCUTANEOUS at 15:24

## 2019-08-05 ASSESSMENT — ACTIVITIES OF DAILY LIVING (ADL)
ADLS_ACUITY_SCORE: 22
ADLS_ACUITY_SCORE: 22

## 2019-08-05 ASSESSMENT — PAIN DESCRIPTION - DESCRIPTORS: DESCRIPTORS: SORE

## 2019-08-05 ASSESSMENT — MIFFLIN-ST. JEOR: SCORE: 1499.78

## 2019-08-05 NOTE — BRIEF OP NOTE
[facility], [place]    Brief Operative Note    Pre-operative diagnosis: Complex atypical hyperplasia   Post-operative diagnosis Same   Procedure: Procedure(s):  DaVinci Assisted Total Laparoscopic Hysterectomy, Removal Of Both Tubes And Ovaries  Surgeon: Surgeon(s) and Role:     * Linh Cardoso MD - Primary     * Natacha Saxena MD - Assisting     * Vita Alcantara MD - Resident - Assisting  Anesthesia: Combined General with TAP Block   Estimated blood loss: 50 ml   Drains:  Cornejo catheter   Specimens:   ID Type Source Tests Collected by Time Destination   A : uterus, cervix, left fallopian tube and ovary Tissue Uterus and Cervix SURGICAL PATHOLOGY EXAM Linh Cardoso MD 8/5/2019 12:53 PM    B : right fallopian tube and ovary Tissue Fallopian Tube and Ovary, Right SURGICAL PATHOLOGY EXAM Linh Cardoso MD 8/5/2019 12:54 PM      Findings: 7 cm anteverted uterus, normal in appearance, normal ovaries and left fallopian tube. Right fallopian tube with 8 cm paratubal cyst, simple in appearance. Normal appearing cervix, vaginal mucosa. Liver cirrhotic in appearance without implants. Normal appearing peritoneum, bowel. No evidence of injury on entry to the abdomen.   Complications: None.  Implants:  * No implants in log *

## 2019-08-05 NOTE — OP NOTE
Procedure Date: 08/05/2019      PREOPERATIVE DIAGNOSES:  1. Endometrial hyperplasia  2. Multiple comorbidities including history of a stroke and coronary artery disease.        POSTOPERATIVE DIAGNOSES:   1. Endometrial hyperplasia  2. Multiple comorbidities including history of a stroke and coronary artery disease.           PROCEDURES PERFORMED:  Robotic-assisted total laparoscopic hysterectomy with bilateral salpingo-oophorectomy.      SURGEON:  Linh Cardoso MD.      ASSISTANTS:   1.  Natacha Saxena MD (GYN Oncology Fellow).   2.  Rosalie Alcantara MD (OB/GYN Resident).      ANESTHESIA:  General with preoperative TAP blocks.      ESTIMATED BLOOD LOSS:  20 mL.      COMPLICATIONS:  None.      SPECIMENS REMOVED:  Uterus, cervix, bilateral fallopian tubes and ovaries.      INDICATIONS:  Ms. Preciado is a 69-year-old woman with a history of postmenopausal bleeding.  Biopsy revealed endometrial complex atypical hyperplasia.  She was brought to the operating room for further surgical diagnosis and management.      FINDINGS:  On bimanual exam, she had a normal cervix and normal external genitalia.  On diagnostic laparoscopy, she had a normal-appearing upper abdomen with the exception of what appeared to be some moderate cirrhosis of her liver.  She had no evidence of metastatic disease.  She had no ascites.  She had a large simple appearing right-sided ovarian mass, which was approximately 8 cm and appeared to be arising from her right ovary.  Her uterus was small and her left tube and ovary were normal.      DESCRIPTION OF PROCEDURE:  After informed consent, the patient was brought to the operating room where general anesthesia was administered.  She was prepped and draped in the dorsal lithotomy position and a Cornejo catheter was placed in her bladder.  A surgical timeout was performed ensuring correct patient and procedure.  She was given Ancef for preoperative prophylactic antibiotics and SCDs were placed on her  lower extremities.      A speculum was placed in the vagina.  The cervix was well visualized.  It was grasped with a single-tooth tenaculum and then gently dilated to facilitate placement of a medium VCare device, which was not sutured in.      Attention was turned to the abdomen.  The umbilicus was grasped with penetrating towel clamps.  A Veress needle was placed.  Opening pressure was 0.  Pneumoperitoneum was established.  An 8 mm incision was made above her umbilicus and using the Visiport technique, a trocar was placed.  Under direct visualization, 2 left lateral robotic trocars were placed, one right-sided robotic trocar was placed and one right-sided 8 mm accessory port was placed.  The patient was placed in steep Trendelenburg and the robot was docked.  The abdomen and pelvis were inspected with the above findings noted.      I first started by opening the right retroperitoneal space.  The ureter was seen peristalsing through the peritoneum.  The right IP was skeletonized, cauterized with bipolar cautery and then cut with the scissors.  I did transect the right proximal fallopian tube and the right utero-ovarian vascular complex to detach the right adnexa, so that I could remove it separately  after the hysterectomy.  I did have to place it in a 10 cm bag through the vagina in order to facilitate removal of this mass intact.      I then opened the left retroperitoneal space and skeletonized the left IP.  The ureter was seen through the peritoneum.  This pedicle was coagulated and then cut with scissors.  A bladder flap was created by transecting the anterior peritoneum and dissecting the bladder off the anterior cervix and vagina.  The uterine arteries were then sacrificed bilaterally with bipolar cautery and cut with the scissors.  The uterus was sharply anteverted.  A posterior colpotomy was performed and carried around circumferentially, thus  the specimen from the upper vagina.  The specimen  was then easily removed through the patient's vagina and then the right adnexa was removed through the patient's vagina as described above.      The vagina was closed from right to left with a 0 Stratafix barbed suture and then cut flush with the vagina.  The pelvis was copiously irrigated and very hemostatic.  The specimen was sent for permanent pathology given the fact that she was not a surgical staging candidate.  The robot was undocked.  The patient was taken out of Trendelenburg.  Pneumoperitoneum was evacuated.  The port sites were closed with 4-0 Vicryl suture in a subcuticular fashion and Steri-Strips were placed over the incision.  The patient was awoken from anesthesia.  The vagina was inspected and noted to be hemostatic.  We did keep a Cornejo catheter in given her comorbidities.  The patient was brought to the PACU in a stable condition.         VENKATESH SPARKS MD             D: 2019   T: 2019   MT: BERENICE      Name:     ZENY TONEY   MRN:      [id number]        Account:        [id number]   :      1950           Procedure Date: 2019      Document: [id number]

## 2019-08-05 NOTE — DISCHARGE SUMMARY
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 s/p procedure below  - Suspected UTI - final culture pending  - H/o myocardial infarction s/p RCA stent placement  - H/o CVA  - H/o T2DM with neuropathy, retinopathy, and microalbuminemia  - HTN  - HLD  - Glaucoma  - Depression    Patient Active Problem List   Diagnosis     Cataract     CAD (coronary artery disease)     Diabetes mellitus, type 2 (H)     Diabetic nephropathy (H)     Diabetic neuropathy (H)     Diabetic retinopathy (H)     GERD (gastroesophageal reflux disease)     Glaucoma     Hypertension     Hyperlipidemia     CVA (cerebral vascular accident) (H)     Morbid obesity (H)     Anemia     Seasonal allergies     Depression     Tubular adenoma of colon     Leg weakness     Dermatitis, seborrheic     Eczematous dermatitis     History of coronary artery disease     Venous stasis dermatitis of both lower extremities     Uncontrolled type 2 diabetes mellitus (H)     Chronic dermatitis of hands     Neoplasm of uncertain behavior of skin     S/P hysterectomy       Procedures: Davinci assisted total laparoscopic hysterectomy with bilateral salpingo-oophorectomy, tap block    Prior to Admission Medications:  Medications Prior to Admission   Medication Sig Dispense Refill Last Dose     acetaminophen (TYLENOL) 500 MG tablet Take 1,000 mg by mouth 3 times daily Tylenol Extra Strength   8/5/2019 at 0530     atorvastatin (LIPITOR) 80 MG tablet Take 1 tablet by mouth daily. Pt needs to  have labs done prior to further refills. (Patient taking differently: Take 80 mg by mouth At Bedtime Pt needs to have labs done prior to further refills.) 90 tablet 0 8/4/2019 at Unknown time     CHLORTHALIDONE PO Take 25 mg by mouth every morning    8/4/2019 at Unknown time     dorzolamide-timolol (COSOPT) 2-0.5 % ophthalmic solution Place 1 drop into both eyes 2 times daily   8/5/2019 at Unknown time     DULoxetine HCl (CYMBALTA PO) Take 60 mg by mouth every morning    8/4/2019 at Unknown time     ferrous sulfate (IRON) 325 (65 FE) MG tablet Take 325 mg by mouth daily (with lunch)    8/4/2019 at Unknown time     folic acid (FOLVITE) 1 MG tablet Take 1 mg by mouth every morning    8/4/2019 at Unknown time     gabapentin (NEURONTIN) 600 MG tablet Take 600 mg in AM , 600 mg afternoon and 900 mg at HS. (Patient taking differently: Take 600 mg by mouth 3 times daily ) 90 tablet 1 8/4/2019 at Unknown time     insulin reg HIGH CONC (HUMULIN R U-500 KWIKPEN) 500 UNIT/ML PEN soln Inject 50 units at breakfast, 50 units at lunch and 60 units at dinner. 20 mL 11 8/4/2019     liraglutide (VICTOZA) 18 MG/3ML SOLN Inject 1.8 mg Subcutaneous daily. Start with 0.6 mg x 5 days, then increase to 1.2 mg x 5 days, then 1.8 mg thereafter.  Do not advance to higher dose if you have nausea. (Patient taking differently: Inject 1.8 mg Subcutaneous every morning ) 3 Month 3 8/4/2019 at Unknown time     lisinopril (PRINIVIL,ZESTRIL) 5 MG tablet Take 2 tablets by mouth daily. Take one tab daily/Hold for SBP < 110 (Patient taking differently: Take 40 mg by mouth every morning Take one tab daily/Hold for SBP < 110) 90 tablet 3 8/4/2019 at Unknown time     metFORMIN (GLUCOPHAGE-XR) 500 MG 24 hr tablet Take 2 tablets (1,000 mg) by mouth daily (with dinner) (Patient taking differently: Take 1,000 mg by mouth every morning ) 180 tablet 3 8/4/2019 at Unknown time     metoprolol (LOPRESSOR) 10 mg/mL SUSP Take 100 mg by mouth 2 times daily   8/4/2019  at 2000     Pregabalin (LYRICA PO) Take 75 mg by mouth 3 times daily    8/4/2019 at Unknown time     ranitidine (ZANTAC) 150 MG tablet Take 150 mg by mouth 2 times daily   8/4/2019 at Unknown time     ASPIRIN LOW DOSE 81 MG chewable tablet CHEW AND SWALLOW 1 TAB BY MOUTH ONCE DAILY IN THE MORNING  99 7/30/2019 at Unknown time     carboxymethylcellulose (REFRESH PLUS) 0.5 % SOLN 1 drop 3 times daily as needed.   Unknown at Unknown time     Elastic Bandages & Supports (JOBST KNEE HIGH COMPRESSION SM) MISC JOBST 20-30MMHG COMPRESSION SM MISC   To both legs during waking hours daily for leg swelling and venous stasis dermatitis. Do not sleep in stockings. 2 each 3 Unknown at Unknown time     insulin pen needle (B-D U/F) 31G X 5 MM Use 5 time(s) per day.  Please dispense as BD Pen Needle Mini U/F 31G x 5  each 11 Unknown at Unknown time     ketoconazole (NIZORAL) 2 % shampoo To entire wet scalp and ears and then wash off after 5 minutes three times a week. (Patient taking differently: Apply topically twice a week To entire wet scalp and ears and then wash off after 5 minutes two times a week.) 240 mL 11 Unknown at Unknown time     latanoprost (XALATAN) 0.005 % ophthalmic solution 1 drop At Bedtime. Left eye   Unknown at Unknown time     loratadine (CLARITIN) 10 MG tablet Take 10 mg by mouth as needed.   Unknown at Unknown time     nitroGLYCERIN (NITROSTAT) 0.4 MG SL tablet Place 0.4 mg under the tongue every 5 minutes as needed.   Unknown at Unknown time     olopatadine HCl (PATADAY) 0.2 % SOLN Place 1 drop into both eyes daily as needed For itchy eyes (Patient taking differently: Place 1 drop into both eyes as needed For itchy eyes) 1 Bottle 5 Unknown at Unknown time     triamcinolone (KENALOG) 0.1 % ointment Apply topically daily To legs under compression stockings for stasis dermatitis discoloration. 60 g 5 Unknown at Unknown time       Consultations:  None    Discharge Medications:        Review of your  medicines      START taking      Dose / Directions   * nitroFURantoin macrocrystal-monohydrate 100 MG capsule  Commonly known as:  MACROBID  Indication:  Urinary Tract Infection  Used for:  Urinary tract infection without hematuria, site unspecified      Dose:  100 mg  Take 1 capsule (100 mg) by mouth every 12 hours For total of 5 days  Refills:  0     * nitroFURantoin macrocrystal-monohydrate 100 MG capsule  Commonly known as:  MACROBID  Used for:  Acute cystitis with hematuria      Dose:  100 mg  Take 1 capsule (100 mg) by mouth 2 times daily  Quantity:  10 capsule  Refills:  0     oxyCODONE 5 MG tablet  Commonly known as:  ROXICODONE      Dose:  5 mg  Take 1 tablet (5 mg) by mouth every 6 hours as needed for severe pain  Quantity:  10 tablet  Refills:  0     senna-docusate 8.6-50 MG tablet  Commonly known as:  SENOKOT-S/PERICOLACE      Dose:  2 tablet  Take 2 tablets by mouth 2 times daily as needed for constipation  Quantity:  60 tablet  Refills:  0         * This list has 2 medication(s) that are the same as other medications prescribed for you. Read the directions carefully, and ask your doctor or other care provider to review them with you.            CONTINUE these medicines which may have CHANGED, or have new prescriptions. If we are uncertain of the size of tablets/capsules you have at home, strength may be listed as something that might have changed.      Dose / Directions   atorvastatin 80 MG tablet  Commonly known as:  LIPITOR  This may have changed:      when to take this    additional instructions  Used for:  Other and unspecified hyperlipidemia      Dose:  80 mg  Take 1 tablet by mouth daily. Pt needs to have labs done prior to further refills.  Quantity:  90 tablet  Refills:  0     gabapentin 600 MG tablet  Commonly known as:  NEURONTIN  This may have changed:      how much to take    how to take this    when to take this    additional instructions  Used for:  Diabetic neuropathy (H)      Take 600  mg in AM , 600 mg afternoon and 900 mg at HS.  Quantity:  90 tablet  Refills:  1     ketoconazole 2 % external shampoo  Commonly known as:  NIZORAL  This may have changed:      how to take this    when to take this    additional instructions  Used for:  Dermatitis, seborrheic      To entire wet scalp and ears and then wash off after 5 minutes three times a week.  Quantity:  240 mL  Refills:  11     liraglutide 18 MG/3ML Soln  Commonly known as:  VICTOZA  This may have changed:      when to take this    additional instructions  Used for:  Diabetes mellitus, type 2 (H)      Dose:  1.8 mg  Inject 1.8 mg Subcutaneous daily. Start with 0.6 mg x 5 days, then increase to 1.2 mg x 5 days, then 1.8 mg thereafter.  Do not advance to higher dose if you have nausea.  Quantity:  3 Month  Refills:  3     lisinopril 5 MG tablet  Commonly known as:  PRINIVIL/ZESTRIL  This may have changed:      how much to take    when to take this    additional instructions  Used for:  Essential hypertension, benign      Dose:  10 mg  Take 2 tablets by mouth daily. Take one tab daily/Hold for SBP < 110  Quantity:  90 tablet  Refills:  3     metFORMIN 500 MG 24 hr tablet  Commonly known as:  GLUCOPHAGE-XR  This may have changed:  when to take this  Used for:  Well controlled type 2 diabetes mellitus (H)      Dose:  1000 mg  Take 2 tablets (1,000 mg) by mouth daily (with dinner)  Quantity:  180 tablet  Refills:  3     olopatadine 0.2 % ophthalmic solution  Commonly known as:  PATADAY  This may have changed:      when to take this    additional instructions  Used for:  History of itching of eye      Dose:  1 drop  Place 1 drop into both eyes daily as needed For itchy eyes  Quantity:  1 Bottle  Refills:  5     traMADol 50 MG tablet  Commonly known as:  ULTRAM  This may have changed:  reasons to take this      Dose:  50 mg  Take 1 tablet (50 mg) by mouth as needed (HOLD IF STILL TAKING OXYCODONE FOR POST OP PAIN)  Refills:  0        CONTINUE these  medicines which have NOT CHANGED      Dose / Directions   acetaminophen 500 MG tablet  Commonly known as:  TYLENOL      Dose:  1000 mg  Take 1,000 mg by mouth 3 times daily Tylenol Extra Strength  Refills:  0     ASPIRIN LOW DOSE 81 MG chewable tablet  Generic drug:  aspirin      CHEW AND SWALLOW 1 TAB BY MOUTH ONCE DAILY IN THE MORNING  Refills:  99     carboxymethylcellulose 0.5 % Soln ophthalmic solution  Commonly known as:  REFRESH PLUS      Dose:  1 drop  1 drop 3 times daily as needed.  Refills:  0     CHLORTHALIDONE PO      Dose:  25 mg  Take 25 mg by mouth every morning  Refills:  0     CLARITIN 10 MG tablet  Generic drug:  loratadine      Dose:  10 mg  Take 10 mg by mouth as needed.  Refills:  0     CYMBALTA PO      Dose:  60 mg  Take 60 mg by mouth every morning  Refills:  0     dorzolamide-timolol 2-0.5 % ophthalmic solution  Commonly known as:  COSOPT      Dose:  1 drop  Place 1 drop into both eyes 2 times daily  Refills:  0     ferrous sulfate 325 (65 Fe) MG tablet  Commonly known as:  FEROSUL      Dose:  325 mg  Take 325 mg by mouth daily (with lunch)  Refills:  0     folic acid 1 MG tablet  Commonly known as:  FOLVITE      Dose:  1 mg  Take 1 mg by mouth every morning  Refills:  0     HUMULIN R U-500 KWIKPEN 500 UNIT/ML PEN soln  Used for:  Well controlled type 2 diabetes mellitus (H)  Generic drug:  insulin reg HIGH CONC      Inject 50 units at breakfast, 50 units at lunch and 60 units at dinner.  Quantity:  20 mL  Refills:  11     insulin pen needle 31G X 5 MM miscellaneous  Commonly known as:  B-D U/F  Used for:  Type 2 diabetes mellitus, controlled (H)      Use 5 time(s) per day.  Please dispense as BD Pen Needle Mini U/F 31G x 5 MM  Quantity:  150 each  Refills:  11     JOBST KNEE HIGH COMPRESSION SM Misc  Used for:  Venous stasis dermatitis of both lower extremities      JOBST 20-30MMHG COMPRESSION SM MISC   To both legs during waking hours daily for leg swelling and venous stasis dermatitis. Do  not sleep in stockings.  Quantity:  2 each  Refills:  3     LYRICA PO      Dose:  75 mg  Take 75 mg by mouth 3 times daily  Refills:  0     metoprolol 10 mg/mL Susp  Commonly known as:  LOPRESSOR      Dose:  100 mg  Take 100 mg by mouth 2 times daily  Refills:  0     nitroGLYcerin 0.4 MG sublingual tablet  Commonly known as:  NITROSTAT      Dose:  0.4 mg  Place 0.4 mg under the tongue every 5 minutes as needed.  Refills:  0     ranitidine 150 MG tablet  Commonly known as:  ZANTAC      Dose:  150 mg  Take 150 mg by mouth 2 times daily  Refills:  0     triamcinolone 0.1 % external ointment  Commonly known as:  KENALOG  Used for:  Venous stasis dermatitis of both lower extremities      Apply topically daily To legs under compression stockings for stasis dermatitis discoloration.  Quantity:  60 g  Refills:  5     XALATAN 0.005 % ophthalmic solution  Generic drug:  latanoprost      Dose:  1 drop  1 drop At Bedtime. Left eye  Refills:  0           Where to get your medicines      These medications were sent to [facility] [place], [place] - [address] [place] [id number]    Phone:  [phone]     nitroFURantoin macrocrystal-monohydrate 100 MG capsule     Some of these will need a paper prescription and others can be bought over the counter. Ask your nurse if you have questions.    Bring a paper prescription for each of these medications    oxyCODONE 5 MG tablet     Information about where to get these medications is not yet available    Ask your nurse or doctor about these medications    traMADol 50 MG tablet         Brief History of Illness:  Cristal is a 70 yo female, who presented with 2 weeks of brown vaginal discharge noted by her care facility. An endometrial biopsy was performed, which showed endometrial atypical hyperplasia/ endometrial intraepithelial neoplasia, with fragments suggestive of polyp. She is status post davinci assisted total laparoscopic hysterectomy  with bilateral salpingo-oophorectomy.    Hospital Course:  Dz:   - Preoperative diagnosis was  endometrial atypical hyperplasia/ endometrial intraepithelial neoplasia,.  Frozen section at the time of the surgery was not performed.  Final pathology is pending at the time of discharge.  She will follow-up postoperatively for a care plan.  FEN:   - IVF were discontinued when she was able to tolerate PO. By discharge, she was tolerating a regular diet without nausea and vomiting and able to maintain her hydration without IVF supplementation.  Pain:   - Her pain was initially controlled on oxycodone and tylenol. NSAIDs were held secondary to CRF. Her pain was well controlled on this and she was discharged home with these medications.  CV:   - She has a history of CAD s/p stent placement, HTN, and HLD. Her home medication of metoprolol was continued in house. Atorvastatin and nitroglycerin were held. Aspirin, chlorthalidone, and lisinopril was resumed POD#1. Her vital signs were stable while in house and she had no acute CV issues she will resume all home meds at time of discharge.  PULM:   - She has no history of pulmonary issues.  She was initially given O2 supplementation in to maintain her O2 sats in the immediate postop period and was transitioned off of this without difficulty. By discharge, her O2 sats were greater than 90% on RA.  She was encouraged to use her bedside IS while in house.  She had no acute pulmonary issues while in house.  HEME:   - Her preoperative Hgb was 10.  Her hgb was stable at 9.3 at the time of discharge. She had no other acute heme issues while in house.  GI:   - She was made NPO prior to the procedure.  On POD#0, her diet was advanced slowly as tolerated.  At the time of discharge, she was tolerating a regular diet without nausea and vomiting.  She will be discharged with a bowel regimen to prevent constipation in the postoperative period.  She had no acute GI issues while in house.  :     -  She is incontinent at baseline and wears Depends. A chase catheter was placed at the time of the surgery. The chase catheter was removed on POD#1.  Prior to discharge, the patient was voiding spontaneously. On POD#1 dirty UA with UC pending.  ID:   - The patient was AF during her hospitalization. She had mild postoperative forgetfulness/confusion. UA/UC was obtained from catheterized urine. UA was positive for WBC, LE, blood, and bacteria. Urine culture is pending. She was prescribed Macrobid 100mg for 5 days which she will continue as an outpatient. Monitor for final UC results.  ENDO:   - She has a history of T2DM on metformin, lugliratide, and insulin at home. THe metformin and lugliratide were held while in house. She was placed on an insulin drip during the post operative period due to hyperglycemia in the PACU, which was discontinued on POD#1. She was restarted on her home regimen on POD#1.   PSYCH/NEURO:   - Patient has a history of CVA, on aspirin, which was initially held, and resumed on POD#1. She also has a history of depression on duloxetine, which was continued in house.  PPX:    - She was given SCDs, IS, PPI and lovenox during her hospital course.  She tolerated these prophylactic interventions without incident.  They were discontinued at the time of her discharge.      Discharge Instructions and Follow up:  Ms. Cristal Preciado was discharged from the hospital with follow up with Dr. Cardoso for postop check.    Discharge Diet: Regular  Discharge Activity: nothing in the vaginal for 6 weeks, otherwise activity as tolerated  No heavy lifting for 6 week(s)  Discharge Follow up: 8/23/19 11:35 AM with Dr. Cardoso    Discharge Disposition:  Discharged to long-term care facility    Discharge Staff: Dr. Joann Islas MD  OB/GYN PGY-1  08/06/19 11:02 AM    Bridgett David MD    Department of Ob/Gyn and Women's Health  Division of Gynecologic Oncology  [facility]  [facility]  [phone]

## 2019-08-05 NOTE — PROGRESS NOTES
Gynecologic Oncology Postoperative Check Note  8/5/2019    S: Patient doing well post-operatively. Denies pain, dizziness, headache, nausea, or vomiting. Tolerating liquids well.     O:  Vitals:    08/05/19 1630 08/05/19 1645 08/05/19 1700 08/05/19 1730   BP: (!) 122/39 129/41 125/49 127/49   BP Location:       Cuff Size:       Pulse: 76 77 77 74   Resp: 9 8 8 8   Temp:       TempSrc:       SpO2:  98% 99% 99%   Weight:       Height:             Gen: alert and oriented, resting in bed  Cardio: RRR, no extra heart sounds appreciated  Resp: lungs CTAB, no wheezes or crackles  Abdomen: soft, appropriately tender to palpation  Incision: dressing in place over 5 laparoscopic incisions with minimal shadowing  Extremities: BLEs non-tender with SCDs in place    I/O last 3 completed shifts:  In: 1010.83 [I.V.:1010.83]  Out: 175 [Urine:170; Blood:5]    A: 69 year old POD#0 s/p DA, TLH, with BSO for endometrial atypical hperplasia/endometrial intraepithelial neoplasia. Doing well.    Dz: Endometrial atypical hperplasia/ endometrial intraepithelial neoplasia. No frozen section was done.  FEN: advance diet as tolerated to carbohydrate consistent diet. BMP, Mg, Phos ordered for AM.   Pain: s/p TAP block, scheduled tylenol, oxycodone prn  Heme: Hgb 10 >EBL 5mls. Will check Hgb in AM.  CV: Hx of CAD s/p RCA stent placement in 2009, HTN, and HLD, home metoprolol ordered. Will resume ASA tomorrow pending morning Hgb. Will likely resume lisinopril and chlorthalidone tomorrow as well.     Pulm: ONIEL. Monitor O2 requirements. Encourage IS use.   GI: ADAT, prn antiemetics and bowel regimen   : chase in place. Will remove in AM. Incontinent at baseline. CKD with baseline Cr 1.1-1.2  ID: Afebrile  Endo: Hx of T2DM, on metformin, liraglutide (both held) and insulin. Patient on insulin drip due to hyperglycemia in PACU. Plan to discontinue tomorrow AM.  Psych/Neuro: History of CVA, will resume ASA tomorrow as a almas. Has a history of  depression on duloxetine.  PPX: SCDs, lovenox POD#1 if Hgb stable  Dispo: Plan for discharge home when meeting postoperative goals.    Jennifer Barragan  MS4, [employer]     I was present with the medical student who participated in the service and in the documentation of this note.  I have verified the history and personally performed the physical exam and medical decision making, and have verified the content of the note, which accurately reflect my assessment of the patient and the plan of care.    Vita Alcantara MD  PGY-3 Gyn Onc

## 2019-08-05 NOTE — ANESTHESIA CARE TRANSFER NOTE
Patient: Cristal Preciado    Procedure(s):  DaVinci Assisted Total Laparoscopic Hysterectomy, Removal Of Both Tubes And Ovaries    Diagnosis: Endometrial Hyperplasia With Atypia  Diagnosis Additional Information: No value filed.    Anesthesia Type:   General     Note:    Patient transferred to:PACU  Comments: Anesthesia Care Transfer Note    Patient: Cristal Preciado    Transferred to: PACU with supplemental O2    Patient vital signs: stable    Airway: none    Monitors on, VSS, breathing spontaneously, report to MOSES Tse CRNA   8/5/2019 1:41 PMHandoff Report: Identifed the Patient, Identified the Reponsible Provider, Reviewed the pertinent medical history, Discussed the surgical course, Reviewed Intra-OP anesthesia mangement and issues during anesthesia, Set expectations for post-procedure period and Allowed opportunity for questions and acknowledgement of understanding      Vitals: (Last set prior to Anesthesia Care Transfer)    CRNA VITALS  8/5/2019 1306 - 8/5/2019 1341      8/5/2019             Resp Rate (observed):  12                Electronically Signed By: LAMONT Tyson CRNA  August 5, 2019  1:41 PM

## 2019-08-05 NOTE — ANESTHESIA POSTPROCEDURE EVALUATION
Anesthesia POST Procedure Evaluation    Patient: Cristal Preciado   MRN:     [id number] Gender:   female   Age:    69 year old :      1950        Preoperative Diagnosis: Endometrial Hyperplasia With Atypia   Procedure(s):  DaVinci Assisted Total Laparoscopic Hysterectomy, Removal Of Both Tubes And Ovaries   Postop Comments: No value filed.       Anesthesia Type:  Not documented  General    Reportable Event: NO     PAIN: Uncomplicated   Sign Out status: Comfortable, Well controlled pain     PONV: No PONV   Sign Out status:  No Nausea or Vomiting     Neuro/Psych: Uneventful perioperative course   Sign Out Status: Preoperative baseline; Age appropriate mentation     Airway/Resp.: Uneventful perioperative course   Sign Out Status: Non labored breathing, age appropriate RR; Resp. Status within EXPECTED Parameters     CV: Uneventful perioperative course   Sign Out status: Appropriate BP and perfusion indices; Appropriate HR/Rhythm     Disposition:   Sign Out in:  PACU  Disposition:  Floor  Recovery Course: Uneventful  Follow-Up: Not required     Comments/Narrative:  BG remains elevated despite insulin bolus.  Will start insulin drip and admit for continued glucose monitoring notified primary team.           Last Anesthesia Record Vitals:  CRNA VITALS  2019 1306 - 2019 1406      2019             Resp Rate (observed):  3  (Abnormal)           Last PACU Vitals:  Vitals Value Taken Time   /57 2019  3:10 PM   Temp 35.8  C (96.4  F) 2019  2:15 PM   Pulse 79 2019  3:10 PM   Resp 15 2019  3:00 PM   SpO2 100 % 2019  3:19 PM   Temp src     NIBP     Pulse     SpO2     Resp     Temp     Ht Rate     Temp 2     Vitals shown include unvalidated device data.      Electronically Signed By: Yumiko Lu MD, 2019, 3:20 PM

## 2019-08-05 NOTE — OR NURSING
I spoke to the nurse Rosalva at the [facility] to get last doses of medications.

## 2019-08-06 VITALS
DIASTOLIC BLOOD PRESSURE: 47 MMHG | SYSTOLIC BLOOD PRESSURE: 147 MMHG | HEART RATE: 84 BPM | HEIGHT: 63 IN | RESPIRATION RATE: 19 BRPM | OXYGEN SATURATION: 96 % | BODY MASS INDEX: 38.89 KG/M2 | WEIGHT: 219.5 LBS | TEMPERATURE: 98.5 F

## 2019-08-06 LAB
ALBUMIN UR-MCNC: 30 MG/DL
ANION GAP SERPL CALCULATED.3IONS-SCNC: 8 MMOL/L (ref 3–14)
APPEARANCE UR: ABNORMAL
BACTERIA #/AREA URNS HPF: ABNORMAL /HPF
BILIRUB UR QL STRIP: NEGATIVE
BUN SERPL-MCNC: 24 MG/DL (ref 7–30)
CALCIUM SERPL-MCNC: 8.8 MG/DL (ref 8.5–10.1)
CHLORIDE SERPL-SCNC: 104 MMOL/L (ref 94–109)
CO2 SERPL-SCNC: 19 MMOL/L (ref 20–32)
COLOR UR AUTO: YELLOW
CREAT SERPL-MCNC: 0.98 MG/DL (ref 0.52–1.04)
ERYTHROCYTE [DISTWIDTH] IN BLOOD BY AUTOMATED COUNT: 12.5 % (ref 10–15)
GFR SERPL CREATININE-BSD FRML MDRD: 59 ML/MIN/{1.73_M2}
GLUCOSE BLDC GLUCOMTR-MCNC: 178 MG/DL (ref 70–99)
GLUCOSE BLDC GLUCOMTR-MCNC: 178 MG/DL (ref 70–99)
GLUCOSE BLDC GLUCOMTR-MCNC: 186 MG/DL (ref 70–99)
GLUCOSE BLDC GLUCOMTR-MCNC: 197 MG/DL (ref 70–99)
GLUCOSE BLDC GLUCOMTR-MCNC: 199 MG/DL (ref 70–99)
GLUCOSE BLDC GLUCOMTR-MCNC: 203 MG/DL (ref 70–99)
GLUCOSE BLDC GLUCOMTR-MCNC: 221 MG/DL (ref 70–99)
GLUCOSE BLDC GLUCOMTR-MCNC: 257 MG/DL (ref 70–99)
GLUCOSE BLDC GLUCOMTR-MCNC: 282 MG/DL (ref 70–99)
GLUCOSE SERPL-MCNC: 184 MG/DL (ref 70–99)
GLUCOSE UR STRIP-MCNC: NEGATIVE MG/DL
HCT VFR BLD AUTO: 29.9 % (ref 35–47)
HGB BLD-MCNC: 9.3 G/DL (ref 11.7–15.7)
HGB UR QL STRIP: ABNORMAL
KETONES UR STRIP-MCNC: 10 MG/DL
LEUKOCYTE ESTERASE UR QL STRIP: ABNORMAL
MAGNESIUM SERPL-MCNC: 1.9 MG/DL (ref 1.6–2.3)
MCH RBC QN AUTO: 31.3 PG (ref 26.5–33)
MCHC RBC AUTO-ENTMCNC: 31.1 G/DL (ref 31.5–36.5)
MCV RBC AUTO: 101 FL (ref 78–100)
MUCOUS THREADS #/AREA URNS LPF: PRESENT /LPF
NITRATE UR QL: NEGATIVE
PH UR STRIP: 6 PH (ref 5–7)
PHOSPHATE SERPL-MCNC: 2.1 MG/DL (ref 2.5–4.5)
PLATELET # BLD AUTO: 195 10E9/L (ref 150–450)
POTASSIUM SERPL-SCNC: 4.8 MMOL/L (ref 3.4–5.3)
RBC # BLD AUTO: 2.97 10E12/L (ref 3.8–5.2)
RBC #/AREA URNS AUTO: >182 /HPF (ref 0–2)
SODIUM SERPL-SCNC: 130 MMOL/L (ref 133–144)
SOURCE: ABNORMAL
SP GR UR STRIP: 1.02 (ref 1–1.03)
SQUAMOUS #/AREA URNS AUTO: 1 /HPF (ref 0–1)
TRANS CELLS #/AREA URNS HPF: 1 /HPF (ref 0–1)
UROBILINOGEN UR STRIP-MCNC: 2 MG/DL (ref 0–2)
WBC # BLD AUTO: 7.8 10E9/L (ref 4–11)
WBC #/AREA URNS AUTO: >182 /HPF (ref 0–5)
WBC CLUMPS #/AREA URNS HPF: PRESENT /HPF

## 2019-08-06 PROCEDURE — 25000131 ZZH RX MED GY IP 250 OP 636 PS 637: Performed by: STUDENT IN AN ORGANIZED HEALTH CARE EDUCATION/TRAINING PROGRAM

## 2019-08-06 PROCEDURE — 25000132 ZZH RX MED GY IP 250 OP 250 PS 637: Performed by: NURSE PRACTITIONER

## 2019-08-06 PROCEDURE — 25000132 ZZH RX MED GY IP 250 OP 250 PS 637: Performed by: OBSTETRICS & GYNECOLOGY

## 2019-08-06 PROCEDURE — 80048 BASIC METABOLIC PNL TOTAL CA: CPT | Performed by: OBSTETRICS & GYNECOLOGY

## 2019-08-06 PROCEDURE — 81001 URINALYSIS AUTO W/SCOPE: CPT | Performed by: OBSTETRICS & GYNECOLOGY

## 2019-08-06 PROCEDURE — 25000125 ZZHC RX 250: Performed by: OBSTETRICS & GYNECOLOGY

## 2019-08-06 PROCEDURE — 36415 COLL VENOUS BLD VENIPUNCTURE: CPT | Performed by: OBSTETRICS & GYNECOLOGY

## 2019-08-06 PROCEDURE — 25000128 H RX IP 250 OP 636: Performed by: STUDENT IN AN ORGANIZED HEALTH CARE EDUCATION/TRAINING PROGRAM

## 2019-08-06 PROCEDURE — 83735 ASSAY OF MAGNESIUM: CPT | Performed by: OBSTETRICS & GYNECOLOGY

## 2019-08-06 PROCEDURE — 00000146 ZZHCL STATISTIC GLUCOSE BY METER IP

## 2019-08-06 PROCEDURE — 25000132 ZZH RX MED GY IP 250 OP 250 PS 637: Performed by: STUDENT IN AN ORGANIZED HEALTH CARE EDUCATION/TRAINING PROGRAM

## 2019-08-06 PROCEDURE — 87086 URINE CULTURE/COLONY COUNT: CPT | Performed by: OBSTETRICS & GYNECOLOGY

## 2019-08-06 PROCEDURE — G0463 HOSPITAL OUTPT CLINIC VISIT: HCPCS

## 2019-08-06 PROCEDURE — 85027 COMPLETE CBC AUTOMATED: CPT | Performed by: OBSTETRICS & GYNECOLOGY

## 2019-08-06 PROCEDURE — 25000131 ZZH RX MED GY IP 250 OP 636 PS 637: Performed by: OBSTETRICS & GYNECOLOGY

## 2019-08-06 PROCEDURE — 84100 ASSAY OF PHOSPHORUS: CPT | Performed by: OBSTETRICS & GYNECOLOGY

## 2019-08-06 RX ORDER — NITROFURANTOIN 25; 75 MG/1; MG/1
100 CAPSULE ORAL EVERY 12 HOURS SCHEDULED
Status: DISCONTINUED | OUTPATIENT
Start: 2019-08-06 | End: 2019-08-06 | Stop reason: HOSPADM

## 2019-08-06 RX ORDER — LISINOPRIL 20 MG/1
40 TABLET ORAL EVERY MORNING
Status: DISCONTINUED | OUTPATIENT
Start: 2019-08-06 | End: 2019-08-06 | Stop reason: HOSPADM

## 2019-08-06 RX ORDER — AMOXICILLIN 250 MG
2 CAPSULE ORAL 2 TIMES DAILY PRN
Qty: 60 TABLET | Refills: 0 | DISCHARGE
Start: 2019-08-06

## 2019-08-06 RX ORDER — CHLORTHALIDONE 25 MG/1
25 TABLET ORAL EVERY MORNING
Status: DISCONTINUED | OUTPATIENT
Start: 2019-08-06 | End: 2019-08-06 | Stop reason: HOSPADM

## 2019-08-06 RX ORDER — NITROFURANTOIN 25; 75 MG/1; MG/1
100 CAPSULE ORAL EVERY 12 HOURS
DISCHARGE
Start: 2019-08-06 | End: 2021-09-10

## 2019-08-06 RX ORDER — TRAMADOL HYDROCHLORIDE 50 MG/1
50 TABLET ORAL PRN
Status: SHIPPED | DISCHARGE
Start: 2019-08-06 | End: 2021-09-10

## 2019-08-06 RX ORDER — NITROFURANTOIN 25; 75 MG/1; MG/1
100 CAPSULE ORAL 2 TIMES DAILY
Qty: 10 CAPSULE | Refills: 0 | Status: SHIPPED | OUTPATIENT
Start: 2019-08-06 | End: 2021-09-10

## 2019-08-06 RX ORDER — OXYCODONE HYDROCHLORIDE 5 MG/1
5 TABLET ORAL EVERY 6 HOURS PRN
Qty: 10 TABLET | Refills: 0 | Status: SHIPPED | OUTPATIENT
Start: 2019-08-06 | End: 2021-09-10

## 2019-08-06 RX ADMIN — HUMAN INSULIN 8 UNITS/HR: 100 INJECTION, SOLUTION SUBCUTANEOUS at 05:39

## 2019-08-06 RX ADMIN — ONDANSETRON 4 MG: 4 TABLET, ORALLY DISINTEGRATING ORAL at 08:03

## 2019-08-06 RX ADMIN — METOPROLOL TARTRATE 100 MG: 100 TABLET, FILM COATED ORAL at 08:05

## 2019-08-06 RX ADMIN — DULOXETINE HYDROCHLORIDE 60 MG: 60 CAPSULE, DELAYED RELEASE ORAL at 08:02

## 2019-08-06 RX ADMIN — ACETAMINOPHEN 650 MG: 325 TABLET, FILM COATED ORAL at 12:08

## 2019-08-06 RX ADMIN — HUMAN INSULIN 10 UNITS/HR: 100 INJECTION, SOLUTION SUBCUTANEOUS at 01:27

## 2019-08-06 RX ADMIN — INSULIN HUMAN 50 UNITS: 500 INJECTION, SOLUTION SUBCUTANEOUS at 09:41

## 2019-08-06 RX ADMIN — NITROFURANTOIN (MONOHYDRATE/MACROCRYSTALS) 100 MG: 75; 25 CAPSULE ORAL at 14:18

## 2019-08-06 RX ADMIN — ENOXAPARIN SODIUM 40 MG: 40 INJECTION SUBCUTANEOUS at 08:05

## 2019-08-06 RX ADMIN — MAGNESIUM HYDROXIDE 15 ML: 400 SUSPENSION ORAL at 08:06

## 2019-08-06 RX ADMIN — ACETAMINOPHEN 650 MG: 325 TABLET, FILM COATED ORAL at 06:26

## 2019-08-06 RX ADMIN — ONDANSETRON 4 MG: 4 TABLET, ORALLY DISINTEGRATING ORAL at 00:57

## 2019-08-06 RX ADMIN — PREGABALIN 75 MG: 75 CAPSULE ORAL at 14:19

## 2019-08-06 RX ADMIN — LISINOPRIL 40 MG: 20 TABLET ORAL at 09:46

## 2019-08-06 RX ADMIN — PREGABALIN 75 MG: 75 CAPSULE ORAL at 08:18

## 2019-08-06 RX ADMIN — GABAPENTIN 600 MG: 600 TABLET, FILM COATED ORAL at 08:02

## 2019-08-06 RX ADMIN — ASPIRIN 81 MG CHEWABLE TABLET 81 MG: 81 TABLET CHEWABLE at 08:02

## 2019-08-06 RX ADMIN — ACETAMINOPHEN 650 MG: 325 TABLET, FILM COATED ORAL at 00:57

## 2019-08-06 RX ADMIN — GABAPENTIN 600 MG: 600 TABLET, FILM COATED ORAL at 14:18

## 2019-08-06 RX ADMIN — CHLORTHALIDONE 25 MG: 25 TABLET ORAL at 09:42

## 2019-08-06 RX ADMIN — DORZOLAMIDE HYDROCHLORIDE AND TIMOLOL MALEATE 1 DROP: 20; 5 SOLUTION/ DROPS OPHTHALMIC at 08:05

## 2019-08-06 RX ADMIN — RANITIDINE 150 MG: 150 TABLET ORAL at 08:03

## 2019-08-06 RX ADMIN — SIMETHICONE CHEW TAB 80 MG 80 MG: 80 TABLET ORAL at 08:03

## 2019-08-06 RX ADMIN — SENNOSIDES AND DOCUSATE SODIUM 1 TABLET: 8.6; 5 TABLET ORAL at 08:19

## 2019-08-06 ASSESSMENT — ACTIVITIES OF DAILY LIVING (ADL)
ADLS_ACUITY_SCORE: 22
ADLS_ACUITY_SCORE: 20

## 2019-08-06 NOTE — PROGRESS NOTES
Gynecologic Oncology   Progress Note      POD#1: DA-TLH, BSO  Disease: CAH/EIN    24 hour events:  - Continued on insulin gtt overnight for poorly controlled blood glucose levels    Subjective: Patient doing okay this morning. Complaining of knee pain. No abdominal pain. Denies, SOB, chest pain, nausea, or vomiting. Tolerating liquids. + flatus. Has not been out of bed.    Objective:   Vitals:    08/05/19 2047 08/05/19 2216 08/06/19 0648 08/06/19 0805   BP: 132/49 139/58 (!) 146/54 (!) 162/50   BP Location:   Right arm    Cuff Size:       Pulse: 81 77  83   Resp:  22 19    Temp:  96.1  F (35.6  C) 98.5  F (36.9  C)    TempSrc:  Oral Axillary    SpO2:  98% 99%    Weight:       Height:           GEN - Alert and orient to person and place. No acute distress.  CV - RRR, no extra heart sounds appreciated  PULM - difficult to examine secondary to positioning and body habitus.  ABD - obese, nontender to palpation  INC - 5 incisions, dressings in place with minimal shadowing  Ext - non tender, no edema bilaterally, bilateral knees mildly tender to palpation, no swelling, or erythema of knees.    Lines/drains:   L hand peripheral IV, L arm peripheral IV  Urinary catheter    I/Os  (Yesterday // Since Midnight)  PO: 360 cc // 0cc  IVF: 1010 cc // 0cc  Urine: 920 cc // 1050ml  Stool: x1 // 0      New Labs/Imaging-   Results for orders placed or performed during the hospital encounter of 08/05/19 (from the past 24 hour(s))   POC US Guidance Needle Placement    Impression    Bilateral TAP.   Glucose by meter   Result Value Ref Range    Glucose 166 (H) 70 - 99 mg/dL   Creatinine   Result Value Ref Range    Creatinine 1.13 (H) 0.52 - 1.04 mg/dL    GFR Estimate 50 (L) >60 mL/min/[1.73_m2]    GFR Estimate If Black 58 (L) >60 mL/min/[1.73_m2]   Potassium   Result Value Ref Range    Potassium 5.1 3.4 - 5.3 mmol/L   Glucose by meter   Result Value Ref Range    Glucose 282 (H) 70 - 99 mg/dL   Glucose by meter   Result Value Ref Range     Glucose 308 (H) 70 - 99 mg/dL   Glucose by meter   Result Value Ref Range    Glucose 279 (H) 70 - 99 mg/dL   Phosphorus   Result Value Ref Range    Phosphorus 3.5 2.5 - 4.5 mg/dL   Glucose by meter   Result Value Ref Range    Glucose 259 (H) 70 - 99 mg/dL   Glucose by meter   Result Value Ref Range    Glucose 253 (H) 70 - 99 mg/dL   Glucose by meter   Result Value Ref Range    Glucose 257 (H) 70 - 99 mg/dL   Glucose by meter   Result Value Ref Range    Glucose 314 (H) 70 - 99 mg/dL   Glucose by meter   Result Value Ref Range    Glucose 283 (H) 70 - 99 mg/dL   Glucose by meter   Result Value Ref Range    Glucose 247 (H) 70 - 99 mg/dL   Glucose by meter   Result Value Ref Range    Glucose 210 (H) 70 - 99 mg/dL   Glucose by meter   Result Value Ref Range    Glucose 203 (H) 70 - 99 mg/dL   Glucose by meter   Result Value Ref Range    Glucose 197 (H) 70 - 99 mg/dL   Glucose by meter   Result Value Ref Range    Glucose 178 (H) 70 - 99 mg/dL   CBC with platelets   Result Value Ref Range    WBC 7.8 4.0 - 11.0 10e9/L    RBC Count 2.97 (L) 3.8 - 5.2 10e12/L    Hemoglobin 9.3 (L) 11.7 - 15.7 g/dL    Hematocrit 29.9 (L) 35.0 - 47.0 %     (H) 78 - 100 fl    MCH 31.3 26.5 - 33.0 pg    MCHC 31.1 (L) 31.5 - 36.5 g/dL    RDW 12.5 10.0 - 15.0 %    Platelet Count 195 150 - 450 10e9/L   Basic metabolic panel   Result Value Ref Range    Sodium 130 (L) 133 - 144 mmol/L    Potassium 4.8 3.4 - 5.3 mmol/L    Chloride 104 94 - 109 mmol/L    Carbon Dioxide 19 (L) 20 - 32 mmol/L    Anion Gap 8 3 - 14 mmol/L    Glucose 184 (H) 70 - 99 mg/dL    Urea Nitrogen 24 7 - 30 mg/dL    Creatinine 0.98 0.52 - 1.04 mg/dL    GFR Estimate 59 (L) >60 mL/min/[1.73_m2]    GFR Estimate If Black 69 >60 mL/min/[1.73_m2]    Calcium 8.8 8.5 - 10.1 mg/dL   Magnesium   Result Value Ref Range    Magnesium 1.9 1.6 - 2.3 mg/dL   Phosphorus   Result Value Ref Range    Phosphorus 2.1 (L) 2.5 - 4.5 mg/dL   Glucose by meter   Result Value Ref Range    Glucose 178 (H)  70 - 99 mg/dL   Glucose by meter   Result Value Ref Range    Glucose 186 (H) 70 - 99 mg/dL   Glucose by meter   Result Value Ref Range    Glucose 199 (H) 70 - 99 mg/dL   Glucose by meter   Result Value Ref Range    Glucose 221 (H) 70 - 99 mg/dL       Assessment:  Ms. Preciado is a 69-year-old now POD#1 s/p DA-TLH, BSO for CAH/EIN. She is doing well in the postoperative period.     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 No frozen section was done.  FEN: Carb consistent diet. BMP remarkable for Na 130, corrected Na 131, likely will reverse with discontinuation of insulin GTT and improved PO intake. Mg 1.9. Phos 2.1.  Pain: s/p TAP block, scheduled tylenol, oxycodone prn. Well controlled.  Heme: Hgb 10 >EBL 5 mL > 9.3. VSS, no signs/sx of ongoing bleeding.  CV: Hx of CAD s/p RCA stent placement in 2009, HTN, and HLD, home metoprolol ordered. ASA resumed today. Lisinopril and chlorthalidone resumed today.  Pulm: ONIEL. Monitor O2 requirements. Encourage IS use.   GI: ADAT, prn antiemetics and bowel regimen   : Remove chase this AM, follow-up void. Incontinent at baseline. CKD with baseline Cr 1.1-1.2. Today Cr 0.98.  ID: Afebrile, UA ordered for mild confusion.  Endo: Hx of T2DM, on metformin, liraglutide (both held) and insulin. Patient on insulin drip due to hyperglycemia in PACU. Discontinued this AM and home insulin regimen of Humulin 50/50/60 resumed.  Psych/Neuro: History of CVA, ASA resumed today. Has a history of depression on duloxetine.  PPX: SCDs, lovenox  Dispo: Plan for discharge home when meeting postoperative goals.    Jennifer Barragan, MS4    I was present with the medical student who participated in the service and in the documentation of this note.  I have verified the history and personally performed the physical exam and medical decision making, and have verified the content of the note, which accurately reflect my assessment of the patient and the plan of  care.      Vita Alcantara MD   PGY-3 Gyn Onc    Bridgett David MD    Department of Ob/Gyn and Women's Health  Division of Gynecologic Oncology  [facility]  [phone]    I saw and evaluated the patient with the resident.  I edited and reviewed the above note.

## 2019-08-06 NOTE — PLAN OF CARE
Alert & Orientated.  Denied pain Hypertensive but within defined parameters. Tolerating regular diet with no complaints of N&V. Blood glucose checks before meals. Blood glucose covered with scheduled insulin. Mechanical lift assistance to wheel chair.   Medical transport took patient at 1450 hours accompanied by son. Discharge paper work given to Medical transport. 1st dose of scheduled antibiotic administered.

## 2019-08-06 NOTE — PLAN OF CARE
POD 0. Pt arrived to 7C from PACU a little before 1600. AVSS on RA. CAPNO in place, WNL. Gave pt sched tylenol for abd pain management. X5 lap sites w/ small primapore dressings in place, scant serosang drainage on the L incisions. Faint BS, pt had x1 incontinent stool. Mepilex on coccyx for suspected PTA PI. Cornejo in place w/ adequate output. Diabetic diet, denies nausea. Fair appetite. Insulin gtt infusing w/ TKO MIVF. Q 1 hr BG checks. BGs slowly improving. X1 L PIV infusing, second L PIV SL. WC bound, lives in a nursing facility. Turned pt onto her R side. Son @ bedside. Cont to monitor pain management. Cont w/ POC.

## 2019-08-06 NOTE — PROGRESS NOTES
SPIRITUAL HEALTH SERVICES  SPIRITUAL ASSESSMENT Progress Note  [facility]C   ON-CALL VISIT    REFERRAL SOURCE: Request on admission.     Visited pt and her eldest son, introduced as the on-call  and had a short reflective conversation.     Pt said that her surgery went well, she is recovering well and is getting discharged later today.    Pt thanked the [facility] for stopping by.    PLAN: I will inform the unit  of the visit.    Edilberto Buchanan  Chaplain Resident  Pager  [phone]

## 2019-08-06 NOTE — PLAN OF CARE
POD #1 DaVinci Assisted Total Laparoscopic Hysterectomy, Removal Of Both Tubes And Ovaries    A x O, VSS on 2L. CAPNO and continuous pulse ox. Pain managed w/ scheduled tylenol. X5 lap sites w/ small primapore dressings in place, scant serosang drainage on the L incisions. Denies n/v, but has scheduled Zofran. Diabetic diet. Q 1 hr BG checks. Cornejo in place w/ adequate output. WC bound. Cont w/ POC.

## 2019-08-06 NOTE — PROGRESS NOTES
Social Work Services Discharge Note      Patient Name:  Cristal Preciado     Anticipated Discharge Date:  8/6/19    Discharge Disposition:   LT:  [facility]   [address] [place] [address]  P: [phone]  F: [phone]    Following MD:  per facility     Pre-Admission Screening (PAS) - N/A      Additional Services/Equipment Arranged:  WC ride at 2:15 PM via [employer] (Ph: [phone]).      Patient / Family response to discharge plan:  Pt and son are agreeable to return to LTC today     Persons notified of above discharge plan:  Pt, Pt's son, bedside nurse, NST, charge nurse, Gyn/Onc team, Court/Milly (P: [phone])    Staff Discharge Instructions:  ERIKA faxed discharge orders and signed hard scripts for any controlled substances.  Please print a packet and send with patient.   Please call for Nurse to Nurse Report P: [phone] and ask for [address]. Medicare Notice of Rights provided to the patient/family:  YES    DAPHNEY Malone, CHRISTIN  7C Surgical/Oncology Unit   Phone: [phone]  Pager: [phone]    Addendum: ERIKA was informed by Gyn/Onc team that an oral antibiotic will be added to Pt's orders at discharge. ERIKA resent discharge orders and spoke with Pt's nurse Rosalva informing of updated information.

## 2019-08-06 NOTE — PROGRESS NOTES
[facility] Nurse Inpatient Wound Assessment   Reason for consultation: Evaluate and treat coccyx wound     Assessment  Coccyx, sacral, bilateral IT wounds due to old pressure injuries with scar tissue, sacral wound slightly re-opened due to moisture.   Status: initial assessment    Treatment Plan  Coccyx, sacral, bilateral IT reopened scar tissue Daily  apply Cavilon barrier film  Implement pressure injury prevention    Orders Written    WOC Nurse follow-up plan:weekly, although plan for discharge today   Nursing to notify the Provider(s) and re-consult the WO Nurse if wound(s) deteriorates or new skin concern.    Patient History  According to provider note(s):  Cristal is a 68 yo female, who presented with 2 weeks of brown vaginal discharge noted by her care facility. An endometrial biopsy was performed, which showed endometrial atypical hyperplasia/ endometrial intraepithelial neoplasia, with fragments suggestive of polyp. She is status post davinci assisted total laparoscopic hysterectomy with bilateral salpingo-oophorectomy.    Objective Data  Containment of urine/stool: Diaper    Active Diet Order  Orders Placed This Encounter      Consistent Carbohydrate Diet 4582-6144 Calories: Moderate Consistent CHO (4-6 CHO units/meal)      Advance Diet as Tolerated      Output:   I/O last 3 completed shifts:  In: 1370.83 [P.O.:360; I.V.:1010.83]  Out: 1975 [Urine:1970; Blood:5]    Risk Assessment:   Sensory Perception: 3-->slightly limited  Moisture: 4-->rarely moist  Activity: 2-->chairfast  Mobility: 2-->very limited  Nutrition: 2-->probably inadequate  Friction and Shear: 2-->potential problem  Lalit Score: 15                          Labs:   Recent Labs   Lab 08/06/19  0421   HGB 9.3*   WBC 7.8       Physical Exam  Skin inspection: focused buttocks, sacrum, coccyx, bilateral IT's    Patient with pink scar tissue to bilateral it's sacrum and coccyx.   Sacral wound with pea sized open area and maceration, indicating likely  re-opened from moisture.     Ordered patient a chair cushion for her wheelchair for discharge.     Interventions  Current support surface: Standard  Atmos Air mattress  Current off-loading measures: Chair cushion  Visual inspection of wound(s) completed  Wound Care: done per plan of care  Supplies: floor stock  Education provided: importance of repositioning and plan of care  Discussed plan of care with Patient and Nurse    Mireille Núñez RN, BSN, CWON

## 2019-08-06 NOTE — PHARMACY-CONSULT NOTE
Cristal Preciado is a 69 year old female who has been using U-500 Insulin prior to admission.  The insulin was supplied in a U-500 Pen.  There is no dose conversion required with the pen.     Patient's home regimen is:  50 units before breakfast   50 units before lunch  60 units before supper     Dulce Torres, Pharm D  August 6, 2019

## 2019-08-07 LAB
BACTERIA SPEC CULT: NO GROWTH
COPATH REPORT: NORMAL
SPECIMEN SOURCE: NORMAL

## 2019-08-08 ENCOUNTER — TELEPHONE (OUTPATIENT)
Dept: ONCOLOGY | Facility: CLINIC | Age: 69
End: 2019-08-08

## 2019-08-08 NOTE — TELEPHONE ENCOUNTER
[facility] called [phone]. Updated the patient's RN that Cristal's Urine Culture was negative. Instructed to stop macrobid.   Lisa Stevens CNP  8/8/2019 1:48 PM

## 2019-08-08 NOTE — TELEPHONE ENCOUNTER
[phone] [facility] confirms they will fax 1 week BS results today.   Jacquelyn Rubio RN on 8/8/2019 at 10:21 AM

## 2019-08-08 NOTE — TELEPHONE ENCOUNTER
Called to review pathology.   Cristal was taking a nap.   Left a message with an RN at [facility]. Also discussed with her daughter, Court.    Will plan to fax path report  Linh Cardoso MD  Gynecologic Oncology  Pager [phone]

## 2019-08-09 ENCOUNTER — AMBULATORY - HEALTHEAST (OUTPATIENT)
Dept: ADMINISTRATIVE | Facility: CLINIC | Age: 69
End: 2019-08-09

## 2019-08-10 ENCOUNTER — MEDICAL CORRESPONDENCE (OUTPATIENT)
Dept: HEALTH INFORMATION MANAGEMENT | Facility: CLINIC | Age: 69
End: 2019-08-10

## 2019-08-13 ENCOUNTER — OFFICE VISIT - HEALTHEAST (OUTPATIENT)
Dept: GERIATRICS | Facility: CLINIC | Age: 69
End: 2019-08-13

## 2019-08-13 DIAGNOSIS — I10 ESSENTIAL HYPERTENSION: ICD-10-CM

## 2019-08-13 DIAGNOSIS — Z86.73 HISTORY OF STROKE: ICD-10-CM

## 2019-08-13 DIAGNOSIS — Z90.710 HX OF HYSTERECTOMY: ICD-10-CM

## 2019-08-16 ENCOUNTER — COMMUNICATION - HEALTHEAST (OUTPATIENT)
Dept: GERIATRICS | Facility: CLINIC | Age: 69
End: 2019-08-16

## 2019-08-16 NOTE — TELEPHONE ENCOUNTER
[facility] Call Center    Phone Message    May a detailed message be left on voicemail: yes    Reason for Call: Other: Rosalva from [facility] is requesting a call back regarding the PT's low blood sugars she's experiencing in the evening.  They would like a call back before the weekend.  [phone]     Action Taken: Message routed to:  [facility] ([facility]): tatiana

## 2019-08-16 NOTE — TELEPHONE ENCOUNTER
Attempted to reach nurse station at [facility] - No answer. X2.  Left clinic number with facility , [facility], who will forward message to nurses for Pt.   Jacquelyn Rubio, RN on 8/16/2019 at 4:12 PM

## 2019-08-16 NOTE — TELEPHONE ENCOUNTER
[facility] Call Center    Phone Message    May a detailed message be left on voicemail: yes    Reason for Call: [facility] called back in regards to the low blood sugars     Action Taken: Message routed to:  [facility] MICHA

## 2019-08-19 NOTE — TELEPHONE ENCOUNTER
Jake Orlando: [phone]    Zenaida forwarded call to [facility], states the NP changed her insulin doses over the weekend related to low BS results in the evening. They will fax new orders and BS results for Renetta Washington to review.   Jacquelyn Rubio RN on 8/19/2019 at 9:37 AM

## 2019-08-23 ENCOUNTER — OFFICE VISIT (OUTPATIENT)
Dept: ONCOLOGY | Facility: CLINIC | Age: 69
End: 2019-08-23
Attending: OBSTETRICS & GYNECOLOGY
Payer: COMMERCIAL

## 2019-08-23 VITALS
RESPIRATION RATE: 14 BRPM | HEART RATE: 71 BPM | OXYGEN SATURATION: 100 % | TEMPERATURE: 98.5 F | DIASTOLIC BLOOD PRESSURE: 67 MMHG | SYSTOLIC BLOOD PRESSURE: 146 MMHG

## 2019-08-23 DIAGNOSIS — N85.02 ENDOMETRIAL HYPERPLASIA WITH ATYPIA: Primary | ICD-10-CM

## 2019-08-23 PROBLEM — R07.9 ACUTE CHEST PAIN: Status: ACTIVE | Noted: 2018-04-24

## 2019-08-23 PROCEDURE — G0463 HOSPITAL OUTPT CLINIC VISIT: HCPCS | Mod: ZF

## 2019-08-23 PROCEDURE — 99024 POSTOP FOLLOW-UP VISIT: CPT | Mod: ZP | Performed by: OBSTETRICS & GYNECOLOGY

## 2019-08-23 ASSESSMENT — PAIN SCALES - GENERAL: PAINLEVEL: NO PAIN (0)

## 2019-08-23 NOTE — LETTER
2019       RE: Cristal Preciado  [facility]  [address]  [place] [address]     Dear Colleague,    Thank you for referring your patient, Cristal Preciado, to the [facility]. Please see a copy of my visit note below.    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 She is here today alone.     She is feeling well since surgery. Does not think she has had any bleeding. Wears a diaper for chronic urinary incontinence. No pain today. Says she is doing the things she normally does.     Notably, She has had type 2 diabetes since . She has complications with neuropathy, microalbuminuria, CAD status post stent , CVA. On ASA 81, but no other blood thinner. Most recent A1C 6.5. She lives in a nursing home due to her CVA. Is in her wheelchair all day. Requires a lift for transfers and can stand for only a few seconds with assistance. Cannot sit up independently due to instability of her core/trunk. Is able to do exercises in her wheelchair.       Review of Systems:  Systemic:No weight changes.    Skin : No skin changes or new lesions.   Eye : No changes in vision.   Pulmonary: No cough or SOB.   Cardiovascular: No CP or palpitations  Gastrointestinal : No diarrhea, constipation, abdominal pain. Bowel habits normal.   Genitourinary: No dysuria, urgency or bleeding  Psychiatric: No depression or anxiety  Hematologic : No palpable lymph nodes.   Endocrine : No hot flashes. No heat/cold intolerance.    +DM.   Neurological: No headaches, no numbness.     Past Medical History:   Diagnosis Date     AION (anterior ischaemic optic neuropathy), left eye      DEON JOHNSON     CAD (coronary artery disease) 3/2009    [facility]; Angio  UM- normal coronary arteries     Cataract      CVA (cerebral vascular accident) (H)     admitted at [facility]     Depression     on Cymbalta     Diabetes mellitus, type 2 (H)      Diabetic nephropathy (H)      Diabetic neuropathy (H)     severe     Diabetic retinopathy (H)      GERD (gastroesophageal reflux disease)      Hyperlipidemia      Hypertension     ECHO 2013, TDS, NL EF     POAG (primary open-angle glaucoma)     adv BE     Seasonal allergies      Tubular adenoma of colon     repeat colonoscopy in        Past Surgical History:   Procedure Laterality Date      SECTION       COLONOSCOPY  7/15/2013    Tubular adenoma; repeat in ;Procedure: COMBINED COLONOSCOPY, SINGLE BIOPSY/POLYPECTOMY BY BIOPSY;;  Surgeon: Don King MD;  Tubular adenoma     DAVINCI HYSTERECTOMY TOTAL, BILATERAL SALPINGO-OOPHORECTOMY, COMBINED N/A 2019    Procedure: DaVinci Assisted Total Laparoscopic Hysterectomy, Removal Of Both Tubes And Ovaries;  Surgeon: Linh Cardoso MD;  Location: [facility]     EXTRACAPSULAR CATARACT EXTRATION WITH INTRAOCULAR LENS IMPLANT  11-10-09, 2-9-10    11-10-09 Lt, 2-9-10 Rt; Left eye 2012     STENT, CORONARY, DELORES      RCA        OBGYN history and Health Maintenance:  P5. One C/S for twins  Last Pap Smear: , NILM.No abnormals  Last Mammogram:  Last Colonoscopy:     Current Outpatient Medications   Medication Sig Dispense Refill     acetaminophen (TYLENOL) 500 MG tablet Take 1,000 mg by mouth 3 times daily Tylenol Extra Strength       ASPIRIN LOW DOSE 81 MG chewable tablet CHEW AND SWALLOW 1 TAB BY MOUTH ONCE DAILY IN THE MORNING  99     atorvastatin (LIPITOR) 80 MG tablet Take 1 tablet by mouth daily. Pt needs to have labs done prior to further refills. (Patient taking differently: Take 80 mg by mouth At Bedtime Pt needs to have labs done prior to further refills.) 90 tablet  0     carboxymethylcellulose (REFRESH PLUS) 0.5 % SOLN 1 drop 3 times daily as needed.       CHLORTHALIDONE PO Take 25 mg by mouth every morning        dorzolamide-timolol (COSOPT) 2-0.5 % ophthalmic solution Place 1 drop into both eyes 2 times daily       DULoxetine HCl (CYMBALTA PO) Take 60 mg by mouth every morning        Elastic Bandages & Supports (JOBST KNEE HIGH COMPRESSION SM) MISC JOBST 20-30MMHG COMPRESSION SM MISC   To both legs during waking hours daily for leg swelling and venous stasis dermatitis. Do not sleep in stockings. 2 each 3     ferrous sulfate (IRON) 325 (65 FE) MG tablet Take 325 mg by mouth daily (with lunch)        folic acid (FOLVITE) 1 MG tablet Take 1 mg by mouth every morning        gabapentin (NEURONTIN) 600 MG tablet Take 600 mg in AM , 600 mg afternoon and 900 mg at HS. (Patient taking differently: Take 600 mg by mouth 3 times daily ) 90 tablet 1     insulin pen needle (B-D U/F) 31G X 5 MM Use 5 time(s) per day.  Please dispense as BD Pen Needle Mini U/F 31G x 5  each 11     insulin reg HIGH CONC (HUMULIN R U-500 KWIKPEN) 500 UNIT/ML PEN soln Inject 50 units at breakfast, 50 units at lunch and 60 units at dinner. 20 mL 11     ketoconazole (NIZORAL) 2 % shampoo To entire wet scalp and ears and then wash off after 5 minutes three times a week. (Patient taking differently: Apply topically twice a week To entire wet scalp and ears and then wash off after 5 minutes two times a week.) 240 mL 11     latanoprost (XALATAN) 0.005 % ophthalmic solution 1 drop At Bedtime. Left eye       liraglutide (VICTOZA) 18 MG/3ML SOLN Inject 1.8 mg Subcutaneous daily. Start with 0.6 mg x 5 days, then increase to 1.2 mg x 5 days, then 1.8 mg thereafter.  Do not advance to higher dose if you have nausea. (Patient taking differently: Inject 1.8 mg Subcutaneous every morning ) 3 Month 3     lisinopril (PRINIVIL,ZESTRIL) 5 MG tablet Take 2 tablets by mouth daily. Take one tab daily/Hold for SBP < 110  (Patient taking differently: Take 40 mg by mouth every morning Take one tab daily/Hold for SBP < 110) 90 tablet 3     loratadine (CLARITIN) 10 MG tablet Take 10 mg by mouth as needed.       metFORMIN (GLUCOPHAGE-XR) 500 MG 24 hr tablet Take 2 tablets (1,000 mg) by mouth daily (with dinner) (Patient taking differently: Take 1,000 mg by mouth every morning ) 180 tablet 3     metoprolol (LOPRESSOR) 10 mg/mL SUSP Take 100 mg by mouth 2 times daily       nitroGLYCERIN (NITROSTAT) 0.4 MG SL tablet Place 0.4 mg under the tongue every 5 minutes as needed.       olopatadine HCl (PATADAY) 0.2 % SOLN Place 1 drop into both eyes daily as needed For itchy eyes (Patient taking differently: Place 1 drop into both eyes as needed For itchy eyes) 1 Bottle 5     oxyCODONE (ROXICODONE) 5 MG tablet Take 1 tablet (5 mg) by mouth every 6 hours as needed for severe pain 10 tablet 0     ranitidine (ZANTAC) 150 MG tablet Take 150 mg by mouth 2 times daily       senna-docusate (SENOKOT-S/PERICOLACE) 8.6-50 MG tablet Take 2 tablets by mouth 2 times daily as needed for constipation 60 tablet 0     traMADol (ULTRAM) 50 MG tablet Take 1 tablet (50 mg) by mouth as needed (HOLD IF STILL TAKING OXYCODONE FOR POST OP PAIN)       triamcinolone (KENALOG) 0.1 % ointment Apply topically daily To legs under compression stockings for stasis dermatitis discoloration. 60 g 5     nitroFURantoin macrocrystal-monohydrate (MACROBID) 100 MG capsule Take 1 capsule (100 mg) by mouth every 12 hours For total of 5 days (Patient not taking: Reported on 8/23/2019)       nitroFURantoin macrocrystal-monohydrate (MACROBID) 100 MG capsule Take 1 capsule (100 mg) by mouth 2 times daily (Patient not taking: Reported on 8/23/2019) 10 capsule 0     Pregabalin (LYRICA PO) Take 75 mg by mouth 3 times daily               Allergies   Allergen Reactions     Dust Mites Other (See Comments)     Sneezing runny eyes and nose.     Food Allergy Formula Hives     Mountain Dew and Walnuts      Pollen Extract Other (See Comments)     Sneezing runny eyes and nose.        Social History:  Social History     Tobacco Use     Smoking status: Former Smoker     Packs/day: 1.50     Years: 45.00     Pack years: 67.50     Types: Cigarettes     Start date: 1968     Last attempt to quit: 3/6/2013     Years since quittin.4     Smokeless tobacco: Never Used   Substance Use Topics     Alcohol use: Not Currently     Work: Retired  Ethnicity identification:   Preferred language: English  Lives at home with: Lives at [facility]    Family History:   The patient's family history is notable for:  Family History   Problem Relation Age of Onset     Hypertension Mother      Cerebrovascular Disease Mother      Glaucoma Father      Cancer Father      Diabetes Sister      Glaucoma Sister      Cancer - colorectal Other      Cerebrovascular Disease Other      Skin Cancer No family hx of      Melanoma No family hx of      Anesthesia Reaction No family hx of      Deep Vein Thrombosis (DVT) No family hx of          Physical Exam:     BP (!) 146/67 (BP Location: Right arm, Patient Position: Chair, Cuff Size: Adult Regular)   Pulse 71   Temp 98.5  F (36.9  C) (Oral)   Resp 14   SpO2 100%   There is no height or weight on file to calculate BMI.    General: Alert and oriented, no acute distress. Slow speech but answers questions appropriately and is AAO.   Psych: Mood stable.   Cardiovascular: RRR,  Pulmonary: Breathing comfortably  GI: MOderate diffuse distention. No masses. No hernia. Incisions all c/d/i.   Lymph: No enlarge lymph nodes in neck or groin  : Deferred today  Ext: Cannot lift legs on her own. Cannot sit on her own.     Patient Name: ZENY TONEY   MR#: [id number]   Specimen #: [id number]   Collected: 2019   Received: 2019   Reported: 2019 16:29   Ordering Phy(s): VENKATESH SPARKS     For improved result formatting, select 'View Enhanced Report Format' under     Linked Documents section.     SPECIMEN(S):   A: Uterus, cervix, left fallopian tube and ovary   B: Fallopian tube and ovary, right     FINAL DIAGNOSIS:     A. UTERUS, CERVIX, LEFT FALLOPIAN TUBE AND OVARY:   - Atypical endometrial hyperplasia, focal   - Benign endometrial polyp   - Adenomyosis   - Cervix with no significant histopathological abnormality identified   - Left fallopian tube with paratubal cyst (3.1 cm)   - Left ovary with atrophic changes     B. FALLOPIAN TUBE AND OVARY, RIGHT:   - Right ovary with serous cystadenoma (8.1 cm) and corpora albicans   - Right fallopian tube with paratubal cyst     I have personally reviewed all specimens and/or slides, including the   listed special stains, and used them   with my medical judgement to determine or confirm the final diagnosis.     Electronically signed out by:     Joaquin Singh M.D., PhD, [facility]     Assessment:    Cristal Preciado is a 69 year old woman with  atypical endometrial hyperplasia (CAH/EIN)      Plan:     1.)   CAH/EIN: no invasive disease. Doing well postop.     -followup gyn onc prn  -incisions all healing well  - off narcotics     2.) Genetic risk factors were assessed: n/a        Linh Cardoso MD    Department of Ob/Gyn and Women's Health  Division of Gynecologic Oncology  [facility]  [phone]

## 2019-08-23 NOTE — NURSING NOTE
"Oncology Rooming Note    August 23, 2019 11:55 AM   Cristal Preciado is a 69 year old female who presents for:    Chief Complaint   Patient presents with     Oncology Clinic Visit     Return/Post Op Visit for Endometrial hyperplasia      Initial Vitals: BP (!) 146/67 (BP Location: Right arm, Patient Position: Chair, Cuff Size: Adult Regular)   Pulse 71   Temp 98.5  F (36.9  C) (Oral)   Resp 14   SpO2 100%  Estimated body mass index is 38.88 kg/m  as calculated from the following:    Height as of 8/5/19: 1.6 m (5' 3\").    Weight as of 8/5/19: 99.6 kg (219 lb 8 oz). There is no height or weight on file to calculate BSA.  No Pain (0) Comment: Data Unavailable   No LMP recorded. Patient is postmenopausal.  Allergies reviewed: Yes  Medications reviewed: Yes    Medications: Medication refills not needed today.  Pharmacy name entered into [employer] [facility]: [id number] [place], [place] - [address] [facility]    Clinical concerns: Patient reports late night abdominal pain, she thinks it might be in the area of where her surgery was.   Cardoso was notified.      Magdalene Bosch RN. MSN            "

## 2019-08-23 NOTE — PROGRESS NOTES
GYN Oncology New Patient Consult    Referring provider:    Claribel Kerns MD  [address]  [place] [address]   RE: Cristal Preciado  : 1950  FLORY: 19      CC:  Complex atypical hyperplasia / Endometrial Intraepithelial Neoplasia    HPI: Ms Cristal Preciado is a 69 year old year old female who presents for consultation regarding EIN/CAH. She is here today alone.     She is feeling well since surgery. Does not think she has had any bleeding. Wears a diaper for chronic urinary incontinence. No pain today. Says she is doing the things she normally does.     Notably, She has had type 2 diabetes since . She has complications with neuropathy, microalbuminuria, CAD status post stent , CVA. On ASA 81, but no other blood thinner. Most recent A1C 6.5. She lives in a nursing home due to her CVA. Is in her wheelchair all day. Requires a lift for transfers and can stand for only a few seconds with assistance. Cannot sit up independently due to instability of her core/trunk. Is able to do exercises in her wheelchair.       Review of Systems:  Systemic:No weight changes.    Skin : No skin changes or new lesions.   Eye : No changes in vision.   Pulmonary: No cough or SOB.   Cardiovascular: No CP or palpitations  Gastrointestinal : No diarrhea, constipation, abdominal pain. Bowel habits normal.   Genitourinary: No dysuria, urgency or bleeding  Psychiatric: No depression or anxiety  Hematologic : No palpable lymph nodes.   Endocrine : No hot flashes. No heat/cold intolerance.    +DM.   Neurological: No headaches, no numbness.     Past Medical History:   Diagnosis Date     AION (anterior ischaemic optic neuropathy), left eye     NAION LE     CAD (coronary artery disease) 3/2009    [facility]; Angio 2013 UM- normal coronary arteries     Cataract      CVA (cerebral vascular accident) (H)     admitted at [facility]     Depression     on Cymbalta     Diabetes mellitus, type 2 (H)       Diabetic nephropathy (H)      Diabetic neuropathy (H)     severe     Diabetic retinopathy (H)      GERD (gastroesophageal reflux disease)      Hyperlipidemia      Hypertension     ECHO 2013, TDS, NL EF     POAG (primary open-angle glaucoma)     adv BE     Seasonal allergies      Tubular adenoma of colon     repeat colonoscopy in        Past Surgical History:   Procedure Laterality Date      SECTION       COLONOSCOPY  7/15/2013    Tubular adenoma; repeat in ;Procedure: COMBINED COLONOSCOPY, SINGLE BIOPSY/POLYPECTOMY BY BIOPSY;;  Surgeon: Don King MD;  Tubular adenoma     DAVINCI HYSTERECTOMY TOTAL, BILATERAL SALPINGO-OOPHORECTOMY, COMBINED N/A 2019    Procedure: DaVinci Assisted Total Laparoscopic Hysterectomy, Removal Of Both Tubes And Ovaries;  Surgeon: Linh Cardoso MD;  Location: [facility] OR     EXTRACAPSULAR CATARACT EXTRATION WITH INTRAOCULAR LENS IMPLANT  11-10-09, 2-9-10    11-10-09 Lt, 2-9-10 Rt; Left eye 2012     STENT, CORONARY, DELORES      RCA        OBGYN history and Health Maintenance:  P5. One C/S for twins  Last Pap Smear: , NILM.No abnormals  Last Mammogram:  Last Colonoscopy:     Current Outpatient Medications   Medication Sig Dispense Refill     acetaminophen (TYLENOL) 500 MG tablet Take 1,000 mg by mouth 3 times daily Tylenol Extra Strength       ASPIRIN LOW DOSE 81 MG chewable tablet CHEW AND SWALLOW 1 TAB BY MOUTH ONCE DAILY IN THE MORNING  99     atorvastatin (LIPITOR) 80 MG tablet Take 1 tablet by mouth daily. Pt needs to have labs done prior to further refills. (Patient taking differently: Take 80 mg by mouth At Bedtime Pt needs to have labs done prior to further refills.) 90 tablet 0     carboxymethylcellulose (REFRESH PLUS) 0.5 % SOLN 1 drop 3 times daily as needed.       CHLORTHALIDONE PO Take 25 mg by mouth every morning        dorzolamide-timolol (COSOPT) 2-0.5 % ophthalmic solution Place 1 drop into both eyes 2 times daily        DULoxetine HCl (CYMBALTA PO) Take 60 mg by mouth every morning        Elastic Bandages & Supports (JOBST KNEE HIGH COMPRESSION SM) MISC JOBST 20-30MMHG COMPRESSION SM MISC   To both legs during waking hours daily for leg swelling and venous stasis dermatitis. Do not sleep in stockings. 2 each 3     ferrous sulfate (IRON) 325 (65 FE) MG tablet Take 325 mg by mouth daily (with lunch)        folic acid (FOLVITE) 1 MG tablet Take 1 mg by mouth every morning        gabapentin (NEURONTIN) 600 MG tablet Take 600 mg in AM , 600 mg afternoon and 900 mg at HS. (Patient taking differently: Take 600 mg by mouth 3 times daily ) 90 tablet 1     insulin pen needle (B-D U/F) 31G X 5 MM Use 5 time(s) per day.  Please dispense as BD Pen Needle Mini U/F 31G x 5  each 11     insulin reg HIGH CONC (HUMULIN R U-500 KWIKPEN) 500 UNIT/ML PEN soln Inject 50 units at breakfast, 50 units at lunch and 60 units at dinner. 20 mL 11     ketoconazole (NIZORAL) 2 % shampoo To entire wet scalp and ears and then wash off after 5 minutes three times a week. (Patient taking differently: Apply topically twice a week To entire wet scalp and ears and then wash off after 5 minutes two times a week.) 240 mL 11     latanoprost (XALATAN) 0.005 % ophthalmic solution 1 drop At Bedtime. Left eye       liraglutide (VICTOZA) 18 MG/3ML SOLN Inject 1.8 mg Subcutaneous daily. Start with 0.6 mg x 5 days, then increase to 1.2 mg x 5 days, then 1.8 mg thereafter.  Do not advance to higher dose if you have nausea. (Patient taking differently: Inject 1.8 mg Subcutaneous every morning ) 3 Month 3     lisinopril (PRINIVIL,ZESTRIL) 5 MG tablet Take 2 tablets by mouth daily. Take one tab daily/Hold for SBP < 110 (Patient taking differently: Take 40 mg by mouth every morning Take one tab daily/Hold for SBP < 110) 90 tablet 3     loratadine (CLARITIN) 10 MG tablet Take 10 mg by mouth as needed.       metFORMIN (GLUCOPHAGE-XR) 500 MG 24 hr tablet Take 2 tablets (1,000 mg)  by mouth daily (with dinner) (Patient taking differently: Take 1,000 mg by mouth every morning ) 180 tablet 3     metoprolol (LOPRESSOR) 10 mg/mL SUSP Take 100 mg by mouth 2 times daily       nitroGLYCERIN (NITROSTAT) 0.4 MG SL tablet Place 0.4 mg under the tongue every 5 minutes as needed.       olopatadine HCl (PATADAY) 0.2 % SOLN Place 1 drop into both eyes daily as needed For itchy eyes (Patient taking differently: Place 1 drop into both eyes as needed For itchy eyes) 1 Bottle 5     oxyCODONE (ROXICODONE) 5 MG tablet Take 1 tablet (5 mg) by mouth every 6 hours as needed for severe pain 10 tablet 0     ranitidine (ZANTAC) 150 MG tablet Take 150 mg by mouth 2 times daily       senna-docusate (SENOKOT-S/PERICOLACE) 8.6-50 MG tablet Take 2 tablets by mouth 2 times daily as needed for constipation 60 tablet 0     traMADol (ULTRAM) 50 MG tablet Take 1 tablet (50 mg) by mouth as needed (HOLD IF STILL TAKING OXYCODONE FOR POST OP PAIN)       triamcinolone (KENALOG) 0.1 % ointment Apply topically daily To legs under compression stockings for stasis dermatitis discoloration. 60 g 5     nitroFURantoin macrocrystal-monohydrate (MACROBID) 100 MG capsule Take 1 capsule (100 mg) by mouth every 12 hours For total of 5 days (Patient not taking: Reported on 8/23/2019)       nitroFURantoin macrocrystal-monohydrate (MACROBID) 100 MG capsule Take 1 capsule (100 mg) by mouth 2 times daily (Patient not taking: Reported on 8/23/2019) 10 capsule 0     Pregabalin (LYRICA PO) Take 75 mg by mouth 3 times daily               Allergies   Allergen Reactions     Dust Mites Other (See Comments)     Sneezing runny eyes and nose.     Food Allergy Formula Hives     Mountain Dew and Walnuts     Pollen Extract Other (See Comments)     Sneezing runny eyes and nose.        Social History:  Social History     Tobacco Use     Smoking status: Former Smoker     Packs/day: 1.50     Years: 45.00     Pack years: 67.50     Types: Cigarettes     Start date:  1968     Last attempt to quit: 3/6/2013     Years since quittin.4     Smokeless tobacco: Never Used   Substance Use Topics     Alcohol use: Not Currently     Work: Retired  Ethnicity identification:   Preferred language: English  Lives at home with: Lives at [facility]    Family History:   The patient's family history is notable for:  Family History   Problem Relation Age of Onset     Hypertension Mother      Cerebrovascular Disease Mother      Glaucoma Father      Cancer Father      Diabetes Sister      Glaucoma Sister      Cancer - colorectal Other      Cerebrovascular Disease Other      Skin Cancer No family hx of      Melanoma No family hx of      Anesthesia Reaction No family hx of      Deep Vein Thrombosis (DVT) No family hx of          Physical Exam:     BP (!) 146/67 (BP Location: Right arm, Patient Position: Chair, Cuff Size: Adult Regular)   Pulse 71   Temp 98.5  F (36.9  C) (Oral)   Resp 14   SpO2 100%   There is no height or weight on file to calculate BMI.    General: Alert and oriented, no acute distress. Slow speech but answers questions appropriately and is AAO.   Psych: Mood stable.   Cardiovascular: RRR,  Pulmonary: Breathing comfortably  GI: MOderate diffuse distention. No masses. No hernia. Incisions all c/d/i.   Lymph: No enlarge lymph nodes in neck or groin  : Deferred today  Ext: Cannot lift legs on her own. Cannot sit on her own.     Patient Name: ZENY TONEY   MR#: [id number]   Specimen #: [id number]   Collected: 2019   Received: 2019   Reported: 2019 16:29   Ordering Phy(s): VENKATESH SPARKS     For improved result formatting, select 'View Enhanced Report Format' under    Linked Documents section.     SPECIMEN(S):   A: Uterus, cervix, left fallopian tube and ovary   B: Fallopian tube and ovary, right     FINAL DIAGNOSIS:     A. UTERUS, CERVIX, LEFT FALLOPIAN TUBE AND OVARY:   - Atypical endometrial hyperplasia, focal   - Benign  endometrial polyp   - Adenomyosis   - Cervix with no significant histopathological abnormality identified   - Left fallopian tube with paratubal cyst (3.1 cm)   - Left ovary with atrophic changes     B. FALLOPIAN TUBE AND OVARY, RIGHT:   - Right ovary with serous cystadenoma (8.1 cm) and corpora albicans   - Right fallopian tube with paratubal cyst     I have personally reviewed all specimens and/or slides, including the   listed special stains, and used them   with my medical judgement to determine or confirm the final diagnosis.     Electronically signed out by:     Joaquin Singh M.D., PhD, [facility]     Assessment:    Cristal Preciado is a 69 year old woman with  atypical endometrial hyperplasia (CAH/EIN)      Plan:     1.)   CAH/EIN: no invasive disease. Doing well postop.     -followup gyn onc prn  -incisions all healing well  - off narcotics     2.) Genetic risk factors were assessed: n/a        Linh Cardoso MD    Department of Ob/Gyn and Women's Health  Division of Gynecologic Oncology  [facility]  [phone]

## 2019-08-26 ENCOUNTER — OFFICE VISIT (OUTPATIENT)
Dept: ENDOCRINOLOGY | Facility: CLINIC | Age: 69
End: 2019-08-26
Payer: COMMERCIAL

## 2019-08-26 ENCOUNTER — TELEPHONE (OUTPATIENT)
Dept: ENDOCRINOLOGY | Facility: CLINIC | Age: 69
End: 2019-08-26

## 2019-08-26 VITALS — HEART RATE: 85 BPM | DIASTOLIC BLOOD PRESSURE: 70 MMHG | SYSTOLIC BLOOD PRESSURE: 135 MMHG

## 2019-08-26 DIAGNOSIS — R53.83 OTHER FATIGUE: ICD-10-CM

## 2019-08-26 DIAGNOSIS — R53.82 CHRONIC FATIGUE: ICD-10-CM

## 2019-08-26 DIAGNOSIS — E11.9 WELL CONTROLLED TYPE 2 DIABETES MELLITUS (H): Primary | ICD-10-CM

## 2019-08-26 NOTE — LETTER
8/26/2019       RE: Cristal Preciado  [facility]  [address]  [place] [address]     Dear Colleague,    Thank you for referring your patient, Cristal Preciado, to the [facility] at [facility]. Please see a copy of my visit note below.    HPI   Ms. Preciado returns for follow up of long standing type 2 diabetes.  She underwent a hysterectomy on 8/5 for endometrial neoplasia. She was told that it was localized and removed.  She is healing well.  Since her surgery, she has not been as hungry as she has been in the past.       She is currently being managed on U-500 insulin (added in 2017) and Metformin 1000 mg daily (added in 2018), Victoza 1.8 mg daily (several years).  She likes this much better than her previous regimen.  Her insulin requirements have slowly decreased since the addition of Metformin.  She has also been losing weight (about 10 pounds in the past few months).  Her appetite is less.   She is currently taking U-500 50 units with breakfast, 40 units with lunch and 50 units with dinner.  Caregivers report that they have to give a snack at 3pm or her sugar will go low.  Lunch dose was reduced from 50 units to 40 units on 8/17.  She has still been low in the afternoon.      Her caretakers at [facility] have been testing her blood sugars 4 times a day. She has had no hypoglycemia, but reports that she occasionally has readings in the 90's.  AM readings are the highest- around 160-279.  She thinks the morning numbers are high because her evening readings are low and she is forced to eat more of a snack.  Lunch readings are 110-220 mg/dL,  Dinner- , HS-  mg/dL.  She always has an HS snack to prevent overnight hypoglycemia.   She is also on liraglutide 1.8 mg daily.  She has snacks in her room- usually fig bars and Oreos.      She is taking gabapentin for neuropathy.  She is also on Cymbalta.  Neuropathy in feet better.   "She does have a new blister on her left heel after her hospitalization.  Nursing has placed her foot in a soft padded shoe and she has had no drainage or breaks in the skin.  She will soon be seeing Dr. Grant for her feet.  She has not been eating as much.   She otherwise has been feeling well and in her usual state of health. She has no other concerns today.     ROS   GENERAL: Reports weight is 242# (measured at nursing home).  No fevers, chills,  night sweats.  HEENT: no dysphagia, diplopia, neck pain or tenderness, dry/scratchy eyes, URI, cough, sinus drainage, tinnitus, sinus pressure.   CV: No CP, SOB.  No palpitations, skipped beats, LOC.  LUNGS: no cough, sputum production, wheezing   ABDOMEN: no diarrhea, constipation, abdominal pain  EXTREMITIES:  She has no more pedal edema.  Hands still swollen, but having less swelling in her ankles and feet.  Has thickened toenails, not cutting into skin.  No pain. She sees Dr. Grant.  Dry skin.  Blister on left heel.     NEUROLOGY: no changes in vision.  Continued tingling and numbness in hands and feet.   MSK: weakness in legs after stroke.  Needs help getting out of wheelchair. Does not use walker any more due to weakness.      PMH   Type 2 diabetes  Neuropathy   Nephropathy  Stroke - uses a walker, but mainly in wheelchair.   CAD, s/p stent   HTN  Dyslipidemia  Cataracts  Glaucoma  GERD    Family Hx:   Mom- stroke  Dad- cancer  1 of 12 children  2 brothers and 2 sisters-  cancer- unsure of type  1 sister with diabetes, on insulin  5 children  Son- MS, milder  Daughter, April- MS  Other kids- healthy  6 grandchildren    Social Hx:   Ms. Preciado lives at [facility].  She keeps herself busy with many activities in the day, exercises (\"light and lively\"), crafts, coloring, baking, light bowling. She has many friends in their 90's there and she enjoys their company. She is seeing her family about 1-2 times a month now.     Has 5 " children, all now living in [facility].  6 grandchildren.  Originally from [facility].     Current Medications  Current Outpatient Medications   Medication Sig Dispense Refill     acetaminophen (TYLENOL) 500 MG tablet Take 1,000 mg by mouth 3 times daily Tylenol Extra Strength       ASPIRIN LOW DOSE 81 MG chewable tablet CHEW AND SWALLOW 1 TAB BY MOUTH ONCE DAILY IN THE MORNING  99     atorvastatin (LIPITOR) 80 MG tablet Take 1 tablet by mouth daily. Pt needs to have labs done prior to further refills. (Patient taking differently: Take 80 mg by mouth At Bedtime Pt needs to have labs done prior to further refills.) 90 tablet 0     carboxymethylcellulose (REFRESH PLUS) 0.5 % SOLN 1 drop 3 times daily as needed.       CHLORTHALIDONE PO Take 25 mg by mouth every morning        dorzolamide-timolol (COSOPT) 2-0.5 % ophthalmic solution Place 1 drop into both eyes 2 times daily       DULoxetine HCl (CYMBALTA PO) Take 60 mg by mouth every morning        Elastic Bandages & Supports (JOBST KNEE HIGH COMPRESSION SM) MISC JOBST 20-30MMHG COMPRESSION SM MISC   To both legs during waking hours daily for leg swelling and venous stasis dermatitis. Do not sleep in stockings. 2 each 3     ferrous sulfate (IRON) 325 (65 FE) MG tablet Take 325 mg by mouth daily (with lunch)        folic acid (FOLVITE) 1 MG tablet Take 1 mg by mouth every morning        gabapentin (NEURONTIN) 600 MG tablet Take 600 mg in AM , 600 mg afternoon and 900 mg at HS. (Patient taking differently: Take 600 mg by mouth 3 times daily ) 90 tablet 1     insulin pen needle (B-D U/F) 31G X 5 MM Use 5 time(s) per day.  Please dispense as BD Pen Needle Mini U/F 31G x 5  each 11     insulin reg HIGH CONC (HUMULIN R U-500 KWIKPEN) 500 UNIT/ML PEN soln Inject 50 units at breakfast, 50 units at lunch and 60 units at dinner. 20 mL 11     ketoconazole (NIZORAL) 2 % shampoo To entire wet scalp and ears and then wash off after 5 minutes three times a week. (Patient  taking differently: Apply topically twice a week To entire wet scalp and ears and then wash off after 5 minutes two times a week.) 240 mL 11     latanoprost (XALATAN) 0.005 % ophthalmic solution 1 drop At Bedtime. Left eye       liraglutide (VICTOZA) 18 MG/3ML SOLN Inject 1.8 mg Subcutaneous daily. Start with 0.6 mg x 5 days, then increase to 1.2 mg x 5 days, then 1.8 mg thereafter.  Do not advance to higher dose if you have nausea. (Patient taking differently: Inject 1.8 mg Subcutaneous every morning ) 3 Month 3     lisinopril (PRINIVIL,ZESTRIL) 5 MG tablet Take 2 tablets by mouth daily. Take one tab daily/Hold for SBP < 110 (Patient taking differently: Take 40 mg by mouth every morning Take one tab daily/Hold for SBP < 110) 90 tablet 3     loratadine (CLARITIN) 10 MG tablet Take 10 mg by mouth as needed.       metFORMIN (GLUCOPHAGE-XR) 500 MG 24 hr tablet Take 2 tablets (1,000 mg) by mouth daily (with dinner) (Patient taking differently: Take 1,000 mg by mouth every morning ) 180 tablet 3     metoprolol (LOPRESSOR) 10 mg/mL SUSP Take 100 mg by mouth 2 times daily       nitroFURantoin macrocrystal-monohydrate (MACROBID) 100 MG capsule Take 1 capsule (100 mg) by mouth every 12 hours For total of 5 days (Patient not taking: Reported on 8/23/2019)       nitroFURantoin macrocrystal-monohydrate (MACROBID) 100 MG capsule Take 1 capsule (100 mg) by mouth 2 times daily (Patient not taking: Reported on 8/23/2019) 10 capsule 0     nitroGLYCERIN (NITROSTAT) 0.4 MG SL tablet Place 0.4 mg under the tongue every 5 minutes as needed.       olopatadine HCl (PATADAY) 0.2 % SOLN Place 1 drop into both eyes daily as needed For itchy eyes (Patient taking differently: Place 1 drop into both eyes as needed For itchy eyes) 1 Bottle 5     oxyCODONE (ROXICODONE) 5 MG tablet Take 1 tablet (5 mg) by mouth every 6 hours as needed for severe pain 10 tablet 0     Pregabalin (LYRICA PO) Take 75 mg by mouth 3 times daily        ranitidine  (ZANTAC) 150 MG tablet Take 150 mg by mouth 2 times daily       senna-docusate (SENOKOT-S/PERICOLACE) 8.6-50 MG tablet Take 2 tablets by mouth 2 times daily as needed for constipation 60 tablet 0     traMADol (ULTRAM) 50 MG tablet Take 1 tablet (50 mg) by mouth as needed (HOLD IF STILL TAKING OXYCODONE FOR POST OP PAIN)       triamcinolone (KENALOG) 0.1 % ointment Apply topically daily To legs under compression stockings for stasis dermatitis discoloration. 60 g 5     Past Medical History:   Diagnosis Date     AION (anterior ischaemic optic neuropathy), left eye     NAION LE     CAD (coronary artery disease) 3/2009    [facility]; Angio  UM- normal coronary arteries     Cataract      CVA (cerebral vascular accident) (H)     admitted at [facility]     on Cymbalta     Diabetes mellitus, type 2 (H)      Diabetic nephropathy (H)      Diabetic neuropathy (H)     severe     Diabetic retinopathy (H)      GERD (gastroesophageal reflux disease)      Hyperlipidemia      Hypertension     ECHO , TDS, NL EF     POAG (primary open-angle glaucoma)     adv BE     Seasonal allergies      Tubular adenoma of colon     repeat colonoscopy in        Past Surgical History:   Procedure Laterality Date      SECTION       COLONOSCOPY  7/15/2013    Tubular adenoma; repeat in 2018;Procedure: COMBINED COLONOSCOPY, SINGLE BIOPSY/POLYPECTOMY BY BIOPSY;;  Surgeon: Don King MD;  Tubular adenoma     DAVINCI HYSTERECTOMY TOTAL, BILATERAL SALPINGO-OOPHORECTOMY, COMBINED N/A 2019    Procedure: DaVinci Assisted Total Laparoscopic Hysterectomy, Removal Of Both Tubes And Ovaries;  Surgeon: Linh Cardoso MD;  Location: [facility] OR     EXTRACAPSULAR CATARACT EXTRATION WITH INTRAOCULAR LENS IMPLANT  11-10-09, 2-9-10    11-10-09 Lt, 2-9-10 Rt; Left eye 2012     STENT, CORONARY, DELORES      RCA       Family History   Problem Relation Age of Onset     Hypertension Mother      Cerebrovascular  Disease Mother      Glaucoma Father      Cancer Father      Diabetes Sister      Glaucoma Sister      Cancer - colorectal Other      Cerebrovascular Disease Other      Skin Cancer No family hx of      Melanoma No family hx of      Anesthesia Reaction No family hx of      Deep Vein Thrombosis (DVT) No family hx of        Social History     Socioeconomic History     Marital status: Single     Spouse name: Not on file     Number of children: Not on file     Years of education: Not on file     Highest education level: Not on file   Occupational History     Not on file   Social Needs     Financial resource strain: Not on file     Food insecurity:     Worry: Not on file     Inability: Not on file     Transportation needs:     Medical: Not on file     Non-medical: Not on file   Tobacco Use     Smoking status: Former Smoker     Packs/day: 1.50     Years: 45.00     Pack years: 67.50     Types: Cigarettes     Start date: 1968     Last attempt to quit: 3/6/2013     Years since quittin.4     Smokeless tobacco: Never Used   Substance and Sexual Activity     Alcohol use: Not Currently     Drug use: Never     Sexual activity: Not Currently   Lifestyle     Physical activity:     Days per week: Not on file     Minutes per session: Not on file     Stress: Not on file   Relationships     Social connections:     Talks on phone: Not on file     Gets together: Not on file     Attends [employer] service: Not on file     Active member of club or organization: Not on file     Attends meetings of clubs or organizations: Not on file     Relationship status: Not on file     Intimate partner violence:     Fear of current or ex partner: Not on file     Emotionally abused: Not on file     Physically abused: Not on file     Forced sexual activity: Not on file   Other Topics Concern     Parent/sibling w/ CABG, MI or angioplasty before 65F 55M? Not Asked   Social History Narrative    Pt has three daughters and two sons, single.  Her daughter  Court, see's her often at the Nursing Home ([facility]).  Moved into Nursing Home in October 2011 after a hospitalization for ALY.  Moved from [place] in 2002 to [facility]. Has 5 grandchildren.       Physical Exam   /70   Pulse 85   GENERAL: VSS.  Answering questions appropriately, appears stated age. Sitting in wheelchair.   EXTREMITIES: Legs with TYREL hose. No pedal edema.  Normal capillary refill.  Normal color and pulses.  Left heel with golf ball sized hematoma.  No skin breaks, redness or drainage noted.      RESULTS  Lab Results   Component Value Date    A1C 6.6 (H) 07/23/2019    A1C 7.5 (H) 10/24/2016    A1C 7.5 (H) 10/12/2015    A1C 8.1 (H) 03/06/2014    A1C 7.2 (H) 03/07/2013    HEMOGLOBINA1 6.5 (A) 05/24/2019    HEMOGLOBINA1 7.1 (A) 10/10/2018    HEMOGLOBINA1 7.1 (A) 03/16/2018    HEMOGLOBINA1 7.3 (A) 06/05/2017    HEMOGLOBINA1 7.3 (A) 06/05/2017       TSH   Date Value Ref Range Status   10/24/2016 1.20 0.40 - 4.00 mU/L Final   10/12/2015 1.18 0.40 - 4.00 mU/L Final   08/21/2014 1.24 0.40 - 4.00 mU/L Final     Comment:     Effective 7/30/2014, the reference range for this assay has changed to reflect   new instrumentation/methodology.     08/05/2013 1.12 0.4 - 5.0 mU/L Final   07/15/2010 0.53 0.4 - 5.0 mU/L Final     T4 Total   Date Value Ref Range Status   10/30/2008 10.6 5.0 - 11.0 ug/dL Final     T4 Free   Date Value Ref Range Status   04/21/2006 1.04 0.70 - 1.85 ng/dL Final   09/23/2005 1.58 0.70 - 1.85 ng/dL Final       ALT   Date Value Ref Range Status   10/12/2015 39 0 - 50 U/L Final   03/06/2014 36 0 - 50 U/L Final   ]    Recent Labs   Lab Test 03/19/18 10/24/16  1301 10/12/15  1626 08/21/14  0935  05/10/12  1022   CHOL 109  --   --  110  --  144   HDL  --   --   --  43*  --  46*   LDL  --  48 51 43   < > 73   TRIG  --   --   --  122  --  123   CHOLHDLRATIO  --   --   --  2.6  --  3.1    < > = values in this interval not displayed.       Lab Results   Component Value Date      08/06/2019      Lab Results   Component Value Date    POTASSIUM 4.8 08/06/2019     Lab Results   Component Value Date    CHLORIDE 104 08/06/2019     Lab Results   Component Value Date    OLAF 8.8 08/06/2019     Lab Results   Component Value Date    CO2 19 08/06/2019     Lab Results   Component Value Date    BUN 24 08/06/2019     Lab Results   Component Value Date    CR 0.98 08/06/2019       GFR Estimate   Date Value Ref Range Status   08/06/2019 59 (L) >60 mL/min/[1.73_m2] Final     Comment:     Non  GFR Calc  Starting 12/18/2018, serum creatinine based estimated GFR (eGFR) will be   calculated using the Chronic Kidney Disease Epidemiology Collaboration   (CKD-EPI) equation.     08/05/2019 50 (L) >60 mL/min/[1.73_m2] Final     Comment:     Non  GFR Calc  Starting 12/18/2018, serum creatinine based estimated GFR (eGFR) will be   calculated using the Chronic Kidney Disease Epidemiology Collaboration   (CKD-EPI) equation.     07/23/2019 48 (L) >60 mL/min/[1.73_m2] Final     Comment:     Non  GFR Calc  Starting 12/18/2018, serum creatinine based estimated GFR (eGFR) will be   calculated using the Chronic Kidney Disease Epidemiology Collaboration   (CKD-EPI) equation.       GFR Estimate If Black   Date Value Ref Range Status   08/06/2019 69 >60 mL/min/[1.73_m2] Final     Comment:      GFR Calc  Starting 12/18/2018, serum creatinine based estimated GFR (eGFR) will be   calculated using the Chronic Kidney Disease Epidemiology Collaboration   (CKD-EPI) equation.     08/05/2019 58 (L) >60 mL/min/[1.73_m2] Final     Comment:      GFR Calc  Starting 12/18/2018, serum creatinine based estimated GFR (eGFR) will be   calculated using the Chronic Kidney Disease Epidemiology Collaboration   (CKD-EPI) equation.     07/23/2019 56 (L) >60 mL/min/[1.73_m2] Final     Comment:      GFR Calc  Starting 12/18/2018, serum creatinine based estimated  GFR (eGFR) will be   calculated using the Chronic Kidney Disease Epidemiology Collaboration   (CKD-EPI) equation.         Lab Results   Component Value Date    MICROL <5 10/12/2015     No results found for: MICROALBUMIN  No results found for: CPEPT, GADAB, ISCAB    Vitamin B12   Date Value Ref Range Status   08/05/2013 506 >210 pg/mL Final     Comment:     Interp: 247-911 = Normal   ]    Most recent eye exam date: : Not Found         Assessment/Plan:      1.  Type 2 diabetes-  Doing quite well, but glucose is too low, forcing her to eat when she is not hungry.  She is now taking only 140 units of U500 insulin per day, so it is reasonable to transition to once daily Tresiba at this point.  Will prescribe U200 Toujeo 90 units every morning (2 injections of 45 units), with a plan to increase dose weekly until AM readings are less than 150 mg/dL.  This should allow Cristal to avoid having to eat to keep her sugars up and allow for weight loss.  Asked her nursing home staff to send glucose update in 1 week, sooner with hypoglycemia.  I am starting Tresiba dose conservatively.  Her insulin requirements have come down significantly with the addition of Metformin and her weight loss.  A1c was 6.6% on 7/23.       2.  Risk factors- BP is well controlled.  Will check for microalbuminuria.   On ACE inhibitor.  Creatinine ok.  LDL <100 on statin, but this has not been checked in a couple years. Hematoma on her heel with no skin breakage.  Will watch. Follows with podiatry.  On gabapentin and cymbalta for neuropathy.  Will check fasting lipids and TSH before next visit.      3. F/U in 3 months with Dr. Herrera, in 6 months with me.       I spent 30 minutes with this patient face to face and explained the conditions and plans (more than 50% of time was counseling/coordination of care, diabetes management, follow up plan for worsening hyper and hypoglycemia). The patient understood and is satisfied with today's visit.     Renetta Washington,  JODY, MPAS   [facility]  Department of Medicine  Division of Endocrinology and Diabetes

## 2019-08-26 NOTE — PROGRESS NOTES
HPI   Ms. Preciado returns for follow up of long standing type 2 diabetes.  She underwent a hysterectomy on 8/5 for endometrial neoplasia. She was told that it was localized and removed.  She is healing well.  Since her surgery, she has not been as hungry as she has been in the past.       She is currently being managed on U-500 insulin (added in 2017) and Metformin 1000 mg daily (added in 2018), Victoza 1.8 mg daily (several years).  She likes this much better than her previous regimen.  Her insulin requirements have slowly decreased since the addition of Metformin.  She has also been losing weight (about 10 pounds in the past few months).  Her appetite is less.   She is currently taking U-500 50 units with breakfast, 40 units with lunch and 50 units with dinner.  Caregivers report that they have to give a snack at 3pm or her sugar will go low.  Lunch dose was reduced from 50 units to 40 units on 8/17.  She has still been low in the afternoon.      Her caretakers at [facility] have been testing her blood sugars 4 times a day. She has had no hypoglycemia, but reports that she occasionally has readings in the 90's.  AM readings are the highest- around 160-279.  She thinks the morning numbers are high because her evening readings are low and she is forced to eat more of a snack.  Lunch readings are 110-220 mg/dL,  Dinner- , HS-  mg/dL.  She always has an HS snack to prevent overnight hypoglycemia.   She is also on liraglutide 1.8 mg daily.  She has snacks in her room- usually fig bars and Oreos.      She is taking gabapentin for neuropathy.  She is also on Cymbalta.  Neuropathy in feet better.  She does have a new blister on her left heel after her hospitalization.  Nursing has placed her foot in a soft padded shoe and she has had no drainage or breaks in the skin.  She will soon be seeing Dr. Grant for her feet.  She has not been eating as much.   She otherwise has been feeling well and in her  "usual state of health. She has no other concerns today.     ROS   GENERAL: Reports weight is 242# (measured at nursing home).  No fevers, chills,  night sweats.  HEENT: no dysphagia, diplopia, neck pain or tenderness, dry/scratchy eyes, URI, cough, sinus drainage, tinnitus, sinus pressure.   CV: No CP, SOB.  No palpitations, skipped beats, LOC.  LUNGS: no cough, sputum production, wheezing   ABDOMEN: no diarrhea, constipation, abdominal pain  EXTREMITIES:  She has no more pedal edema.  Hands still swollen, but having less swelling in her ankles and feet.  Has thickened toenails, not cutting into skin.  No pain. She sees Dr. Grant.  Dry skin.  Blister on left heel.     NEUROLOGY: no changes in vision.  Continued tingling and numbness in hands and feet.   MSK: weakness in legs after stroke.  Needs help getting out of wheelchair. Does not use walker any more due to weakness.      PMH   Type 2 diabetes  Neuropathy   Nephropathy  Stroke - uses a walker, but mainly in wheelchair.   CAD, s/p stent   HTN  Dyslipidemia  Cataracts  Glaucoma  GERD    Family Hx:   Mom- stroke  Dad- cancer  1 of 12 children  2 brothers and 2 sisters-  cancer- unsure of type  1 sister with diabetes, on insulin  5 children  Son- MS, milder  Daughter, April- MS  Other kids- healthy  6 grandchildren    Social Hx:   Ms. Preciado lives at [facility].  She keeps herself busy with many activities in the day, exercises (\"light and lively\"), crafts, coloring, baking, light bowling. She has many friends in their 90's there and she enjoys their company. She is seeing her family about 1-2 times a month now.     Has 5 children, all now living in [facility].  6 grandchildren.  Originally from [facility].     Current Medications  Current Outpatient Medications   Medication Sig Dispense Refill     acetaminophen (TYLENOL) 500 MG tablet Take 1,000 mg by mouth 3 times daily Tylenol Extra Strength       ASPIRIN LOW DOSE 81 MG " chewable tablet CHEW AND SWALLOW 1 TAB BY MOUTH ONCE DAILY IN THE MORNING  99     atorvastatin (LIPITOR) 80 MG tablet Take 1 tablet by mouth daily. Pt needs to have labs done prior to further refills. (Patient taking differently: Take 80 mg by mouth At Bedtime Pt needs to have labs done prior to further refills.) 90 tablet 0     carboxymethylcellulose (REFRESH PLUS) 0.5 % SOLN 1 drop 3 times daily as needed.       CHLORTHALIDONE PO Take 25 mg by mouth every morning        dorzolamide-timolol (COSOPT) 2-0.5 % ophthalmic solution Place 1 drop into both eyes 2 times daily       DULoxetine HCl (CYMBALTA PO) Take 60 mg by mouth every morning        Elastic Bandages & Supports (JOBST KNEE HIGH COMPRESSION SM) MISC JOBST 20-30MMHG COMPRESSION SM MISC   To both legs during waking hours daily for leg swelling and venous stasis dermatitis. Do not sleep in stockings. 2 each 3     ferrous sulfate (IRON) 325 (65 FE) MG tablet Take 325 mg by mouth daily (with lunch)        folic acid (FOLVITE) 1 MG tablet Take 1 mg by mouth every morning        gabapentin (NEURONTIN) 600 MG tablet Take 600 mg in AM , 600 mg afternoon and 900 mg at HS. (Patient taking differently: Take 600 mg by mouth 3 times daily ) 90 tablet 1     insulin pen needle (B-D U/F) 31G X 5 MM Use 5 time(s) per day.  Please dispense as BD Pen Needle Mini U/F 31G x 5  each 11     insulin reg HIGH CONC (HUMULIN R U-500 KWIKPEN) 500 UNIT/ML PEN soln Inject 50 units at breakfast, 50 units at lunch and 60 units at dinner. 20 mL 11     ketoconazole (NIZORAL) 2 % shampoo To entire wet scalp and ears and then wash off after 5 minutes three times a week. (Patient taking differently: Apply topically twice a week To entire wet scalp and ears and then wash off after 5 minutes two times a week.) 240 mL 11     latanoprost (XALATAN) 0.005 % ophthalmic solution 1 drop At Bedtime. Left eye       liraglutide (VICTOZA) 18 MG/3ML SOLN Inject 1.8 mg Subcutaneous daily. Start with 0.6  mg x 5 days, then increase to 1.2 mg x 5 days, then 1.8 mg thereafter.  Do not advance to higher dose if you have nausea. (Patient taking differently: Inject 1.8 mg Subcutaneous every morning ) 3 Month 3     lisinopril (PRINIVIL,ZESTRIL) 5 MG tablet Take 2 tablets by mouth daily. Take one tab daily/Hold for SBP < 110 (Patient taking differently: Take 40 mg by mouth every morning Take one tab daily/Hold for SBP < 110) 90 tablet 3     loratadine (CLARITIN) 10 MG tablet Take 10 mg by mouth as needed.       metFORMIN (GLUCOPHAGE-XR) 500 MG 24 hr tablet Take 2 tablets (1,000 mg) by mouth daily (with dinner) (Patient taking differently: Take 1,000 mg by mouth every morning ) 180 tablet 3     metoprolol (LOPRESSOR) 10 mg/mL SUSP Take 100 mg by mouth 2 times daily       nitroFURantoin macrocrystal-monohydrate (MACROBID) 100 MG capsule Take 1 capsule (100 mg) by mouth every 12 hours For total of 5 days (Patient not taking: Reported on 8/23/2019)       nitroFURantoin macrocrystal-monohydrate (MACROBID) 100 MG capsule Take 1 capsule (100 mg) by mouth 2 times daily (Patient not taking: Reported on 8/23/2019) 10 capsule 0     nitroGLYCERIN (NITROSTAT) 0.4 MG SL tablet Place 0.4 mg under the tongue every 5 minutes as needed.       olopatadine HCl (PATADAY) 0.2 % SOLN Place 1 drop into both eyes daily as needed For itchy eyes (Patient taking differently: Place 1 drop into both eyes as needed For itchy eyes) 1 Bottle 5     oxyCODONE (ROXICODONE) 5 MG tablet Take 1 tablet (5 mg) by mouth every 6 hours as needed for severe pain 10 tablet 0     Pregabalin (LYRICA PO) Take 75 mg by mouth 3 times daily        ranitidine (ZANTAC) 150 MG tablet Take 150 mg by mouth 2 times daily       senna-docusate (SENOKOT-S/PERICOLACE) 8.6-50 MG tablet Take 2 tablets by mouth 2 times daily as needed for constipation 60 tablet 0     traMADol (ULTRAM) 50 MG tablet Take 1 tablet (50 mg) by mouth as needed (HOLD IF STILL TAKING OXYCODONE FOR POST OP PAIN)        triamcinolone (KENALOG) 0.1 % ointment Apply topically daily To legs under compression stockings for stasis dermatitis discoloration. 60 g 5     Past Medical History:   Diagnosis Date     AION (anterior ischaemic optic neuropathy), left eye     NAION LE     CAD (coronary artery disease) 3/2009    [facility]; Angio  [facility]- normal coronary arteries     Cataract      CVA (cerebral vascular accident) (H)     admitted at [facility]     on Cymbalta     Diabetes mellitus, type 2 (H)      Diabetic nephropathy (H)      Diabetic neuropathy (H)     severe     Diabetic retinopathy (H)      GERD (gastroesophageal reflux disease)      Hyperlipidemia      Hypertension     ECHO , TDS, NL EF     POAG (primary open-angle glaucoma)     adv BE     Seasonal allergies      Tubular adenoma of colon     repeat colonoscopy in        Past Surgical History:   Procedure Laterality Date      SECTION       COLONOSCOPY  7/15/2013    Tubular adenoma; repeat in ;Procedure: COMBINED COLONOSCOPY, SINGLE BIOPSY/POLYPECTOMY BY BIOPSY;;  Surgeon: Don King MD;  Tubular adenoma     DAVINCI HYSTERECTOMY TOTAL, BILATERAL SALPINGO-OOPHORECTOMY, COMBINED N/A 2019    Procedure: DaVinci Assisted Total Laparoscopic Hysterectomy, Removal Of Both Tubes And Ovaries;  Surgeon: Linh Cardoso MD;  Location: [facility] OR     EXTRACAPSULAR CATARACT EXTRATION WITH INTRAOCULAR LENS IMPLANT  11-10-09, 2-9-10    11-10-09 Lt, 2-9-10 Rt; Left eye 2012     STENT, CORONARY, DELORES  2009    RCA       Family History   Problem Relation Age of Onset     Hypertension Mother      Cerebrovascular Disease Mother      Glaucoma Father      Cancer Father      Diabetes Sister      Glaucoma Sister      Cancer - colorectal Other      Cerebrovascular Disease Other      Skin Cancer No family hx of      Melanoma No family hx of      Anesthesia Reaction No family hx of      Deep Vein Thrombosis (DVT) No family hx of        Social  History     Socioeconomic History     Marital status: Single     Spouse name: Not on file     Number of children: Not on file     Years of education: Not on file     Highest education level: Not on file   Occupational History     Not on file   Social Needs     Financial resource strain: Not on file     Food insecurity:     Worry: Not on file     Inability: Not on file     Transportation needs:     Medical: Not on file     Non-medical: Not on file   Tobacco Use     Smoking status: Former Smoker     Packs/day: 1.50     Years: 45.00     Pack years: 67.50     Types: Cigarettes     Start date: 1968     Last attempt to quit: 3/6/2013     Years since quittin.4     Smokeless tobacco: Never Used   Substance and Sexual Activity     Alcohol use: Not Currently     Drug use: Never     Sexual activity: Not Currently   Lifestyle     Physical activity:     Days per week: Not on file     Minutes per session: Not on file     Stress: Not on file   Relationships     Social connections:     Talks on phone: Not on file     Gets together: Not on file     Attends Christian service: Not on file     Active member of club or organization: Not on file     Attends meetings of clubs or organizations: Not on file     Relationship status: Not on file     Intimate partner violence:     Fear of current or ex partner: Not on file     Emotionally abused: Not on file     Physically abused: Not on file     Forced sexual activity: Not on file   Other Topics Concern     Parent/sibling w/ CABG, MI or angioplasty before 65F 55M? Not Asked   Social History Narrative    Pt has three daughters and two sons, single.  Her daughter Court, see's her often at the Nursing Home ([facility]).  Moved into Nursing Home in 2011 after a hospitalization for ALY.  Moved from [place] in  to [facility]. Has 5 grandchildren.       Physical Exam   /70   Pulse 85   GENERAL: VSS.  Answering questions appropriately, appears stated age. Sitting  in wheelchair.   EXTREMITIES: Legs with TYREL hose. No pedal edema.  Normal capillary refill.  Normal color and pulses.  Left heel with golf ball sized hematoma.  No skin breaks, redness or drainage noted.      RESULTS  Lab Results   Component Value Date    A1C 6.6 (H) 07/23/2019    A1C 7.5 (H) 10/24/2016    A1C 7.5 (H) 10/12/2015    A1C 8.1 (H) 03/06/2014    A1C 7.2 (H) 03/07/2013    HEMOGLOBINA1 6.5 (A) 05/24/2019    HEMOGLOBINA1 7.1 (A) 10/10/2018    HEMOGLOBINA1 7.1 (A) 03/16/2018    HEMOGLOBINA1 7.3 (A) 06/05/2017    HEMOGLOBINA1 7.3 (A) 06/05/2017       TSH   Date Value Ref Range Status   10/24/2016 1.20 0.40 - 4.00 mU/L Final   10/12/2015 1.18 0.40 - 4.00 mU/L Final   08/21/2014 1.24 0.40 - 4.00 mU/L Final     Comment:     Effective 7/30/2014, the reference range for this assay has changed to reflect   new instrumentation/methodology.     08/05/2013 1.12 0.4 - 5.0 mU/L Final   07/15/2010 0.53 0.4 - 5.0 mU/L Final     T4 Total   Date Value Ref Range Status   10/30/2008 10.6 5.0 - 11.0 ug/dL Final     T4 Free   Date Value Ref Range Status   04/21/2006 1.04 0.70 - 1.85 ng/dL Final   09/23/2005 1.58 0.70 - 1.85 ng/dL Final       ALT   Date Value Ref Range Status   10/12/2015 39 0 - 50 U/L Final   03/06/2014 36 0 - 50 U/L Final   ]    Recent Labs   Lab Test 03/19/18 10/24/16  1301 10/12/15  1626 08/21/14  0935  05/10/12  1022   CHOL 109  --   --  110  --  144   HDL  --   --   --  43*  --  46*   LDL  --  48 51 43   < > 73   TRIG  --   --   --  122  --  123   CHOLHDLRATIO  --   --   --  2.6  --  3.1    < > = values in this interval not displayed.       Lab Results   Component Value Date     08/06/2019      Lab Results   Component Value Date    POTASSIUM 4.8 08/06/2019     Lab Results   Component Value Date    CHLORIDE 104 08/06/2019     Lab Results   Component Value Date    OLAF 8.8 08/06/2019     Lab Results   Component Value Date    CO2 19 08/06/2019     Lab Results   Component Value Date    BUN 24 08/06/2019      Lab Results   Component Value Date    CR 0.98 08/06/2019       GFR Estimate   Date Value Ref Range Status   08/06/2019 59 (L) >60 mL/min/[1.73_m2] Final     Comment:     Non  GFR Calc  Starting 12/18/2018, serum creatinine based estimated GFR (eGFR) will be   calculated using the Chronic Kidney Disease Epidemiology Collaboration   (CKD-EPI) equation.     08/05/2019 50 (L) >60 mL/min/[1.73_m2] Final     Comment:     Non  GFR Calc  Starting 12/18/2018, serum creatinine based estimated GFR (eGFR) will be   calculated using the Chronic Kidney Disease Epidemiology Collaboration   (CKD-EPI) equation.     07/23/2019 48 (L) >60 mL/min/[1.73_m2] Final     Comment:     Non  GFR Calc  Starting 12/18/2018, serum creatinine based estimated GFR (eGFR) will be   calculated using the Chronic Kidney Disease Epidemiology Collaboration   (CKD-EPI) equation.       GFR Estimate If Black   Date Value Ref Range Status   08/06/2019 69 >60 mL/min/[1.73_m2] Final     Comment:      GFR Calc  Starting 12/18/2018, serum creatinine based estimated GFR (eGFR) will be   calculated using the Chronic Kidney Disease Epidemiology Collaboration   (CKD-EPI) equation.     08/05/2019 58 (L) >60 mL/min/[1.73_m2] Final     Comment:      GFR Calc  Starting 12/18/2018, serum creatinine based estimated GFR (eGFR) will be   calculated using the Chronic Kidney Disease Epidemiology Collaboration   (CKD-EPI) equation.     07/23/2019 56 (L) >60 mL/min/[1.73_m2] Final     Comment:      GFR Calc  Starting 12/18/2018, serum creatinine based estimated GFR (eGFR) will be   calculated using the Chronic Kidney Disease Epidemiology Collaboration   (CKD-EPI) equation.         Lab Results   Component Value Date    MICROL <5 10/12/2015     No results found for: MICROALBUMIN  No results found for: CPEPT, GADAB, ISCAB    Vitamin B12   Date Value Ref Range Status   08/05/2013 506  >210 pg/mL Final     Comment:     Interp: 247-911 = Normal   ]    Most recent eye exam date: : Not Found         Assessment/Plan:      1.  Type 2 diabetes-  Doing quite well, but glucose is too low, forcing her to eat when she is not hungry.  She is now taking only 140 units of U500 insulin per day, so it is reasonable to transition to once daily Tresiba at this point.  Will prescribe U200 Toujeo 90 units every morning (2 injections of 45 units), with a plan to increase dose weekly until AM readings are less than 150 mg/dL.  This should allow Cristal to avoid having to eat to keep her sugars up and allow for weight loss.  Asked her nursing home staff to send glucose update in 1 week, sooner with hypoglycemia.  I am starting Tresiba dose conservatively.  Her insulin requirements have come down significantly with the addition of Metformin and her weight loss.  A1c was 6.6% on 7/23.       2.  Risk factors- BP is well controlled.  Will check for microalbuminuria.   On ACE inhibitor.  Creatinine ok.  LDL <100 on statin, but this has not been checked in a couple years. Hematoma on her heel with no skin breakage.  Will watch. Follows with podiatry.  On gabapentin and cymbalta for neuropathy.  Will check fasting lipids and TSH before next visit.      3. F/U in 3 months with Dr. Herrera, in 6 months with me.       I spent 30 minutes with this patient face to face and explained the conditions and plans (more than 50% of time was counseling/coordination of care, diabetes management, follow up plan for worsening hyper and hypoglycemia). The patient understood and is satisfied with today's visit.     Renetta Washington PA-C, MPAS   [facility]  Department of Medicine  Division of Endocrinology and Diabetes

## 2019-08-27 ENCOUNTER — RECORDS - HEALTHEAST (OUTPATIENT)
Dept: LAB | Facility: CLINIC | Age: 69
End: 2019-08-27

## 2019-08-27 LAB
CHOLEST SERPL-MCNC: 102 MG/DL
FASTING STATUS PATIENT QL REPORTED: ABNORMAL
HDLC SERPL-MCNC: 32 MG/DL
LDLC SERPL CALC-MCNC: 45 MG/DL
TRIGL SERPL-MCNC: 123 MG/DL
TSH SERPL DL<=0.005 MIU/L-ACNC: 1.32 UIU/ML (ref 0.3–5)

## 2019-09-04 ENCOUNTER — DOCUMENTATION ONLY (OUTPATIENT)
Dept: ENDOCRINOLOGY | Facility: CLINIC | Age: 69
End: 2019-09-04

## 2019-09-04 NOTE — PROGRESS NOTES
Reviewed glucose since switching from U-500 to Toujeo.  She is now on 90 units of Toujeo every AM.  Blood sugars have ranged:  AM: 125-177  Noon: 180-256  Dinner: 151-190  Bed: 127-217    Advised to increase Toujeo to 100 units (2 shots of 50 units) every morning.  Call if readings less than 70 mg/dL.      They will send more data next week.   Renetta Washington PA-C

## 2019-09-10 ENCOUNTER — DOCUMENTATION ONLY (OUTPATIENT)
Dept: ENDOCRINOLOGY | Facility: CLINIC | Age: 69
End: 2019-09-10

## 2019-09-10 DIAGNOSIS — E11.9 WELL CONTROLLED TYPE 2 DIABETES MELLITUS (H): ICD-10-CM

## 2019-09-10 NOTE — PROGRESS NOTES
Reviewed glucose since increasing Toujeo from 90 units to 100 units. Blood sugars have ranged:  AM:   Noon: 104-282  Dinner:   Bed: 109-217    Cristal's appetite is poor and she does not always eat.  Advised to reduce Toujeo to 90 units (2 shots of 45 units) every morning.  Call if readings less than 70 mg/dL.      They will send more data next week.   Renetta Washington PA-C

## 2019-09-11 ENCOUNTER — OFFICE VISIT - HEALTHEAST (OUTPATIENT)
Dept: GERIATRICS | Facility: CLINIC | Age: 69
End: 2019-09-11

## 2019-09-11 ENCOUNTER — RECORDS - HEALTHEAST (OUTPATIENT)
Dept: LAB | Facility: CLINIC | Age: 69
End: 2019-09-11

## 2019-09-11 DIAGNOSIS — R10.31 RIGHT LOWER QUADRANT ABDOMINAL PAIN: ICD-10-CM

## 2019-09-11 DIAGNOSIS — R53.81 DEBILITY: ICD-10-CM

## 2019-09-11 DIAGNOSIS — I10 ESSENTIAL HYPERTENSION: ICD-10-CM

## 2019-09-11 DIAGNOSIS — R52 PAIN MANAGEMENT: ICD-10-CM

## 2019-09-11 LAB
ALBUMIN UR-MCNC: NEGATIVE MG/DL
AMORPH CRY #/AREA URNS HPF: ABNORMAL /[HPF]
ANION GAP SERPL CALCULATED.3IONS-SCNC: 9 MMOL/L (ref 5–18)
APPEARANCE UR: ABNORMAL
BACTERIA #/AREA URNS HPF: ABNORMAL HPF
BASOPHILS # BLD AUTO: 0 THOU/UL (ref 0–0.2)
BASOPHILS NFR BLD AUTO: 1 % (ref 0–2)
BILIRUB UR QL STRIP: NEGATIVE
BUN SERPL-MCNC: 32 MG/DL (ref 8–22)
CALCIUM SERPL-MCNC: 9.4 MG/DL (ref 8.5–10.5)
CHLORIDE BLD-SCNC: 104 MMOL/L (ref 98–107)
CO2 SERPL-SCNC: 16 MMOL/L (ref 22–31)
COLOR UR AUTO: ABNORMAL
CREAT SERPL-MCNC: 1.46 MG/DL (ref 0.6–1.1)
EOSINOPHIL # BLD AUTO: 0.2 THOU/UL (ref 0–0.4)
EOSINOPHIL NFR BLD AUTO: 4 % (ref 0–6)
ERYTHROCYTE [DISTWIDTH] IN BLOOD BY AUTOMATED COUNT: 12.6 % (ref 11–14.5)
GFR SERPL CREATININE-BSD FRML MDRD: 36 ML/MIN/1.73M2
GLUCOSE BLD-MCNC: 106 MG/DL (ref 70–125)
GLUCOSE UR STRIP-MCNC: NEGATIVE MG/DL
HCT VFR BLD AUTO: 29.9 % (ref 35–47)
HGB BLD-MCNC: 9.6 G/DL (ref 12–16)
HGB UR QL STRIP: NEGATIVE
KETONES UR STRIP-MCNC: NEGATIVE MG/DL
LEUKOCYTE ESTERASE UR QL STRIP: ABNORMAL
LYMPHOCYTES # BLD AUTO: 1.5 THOU/UL (ref 0.8–4.4)
LYMPHOCYTES NFR BLD AUTO: 39 % (ref 20–40)
MCH RBC QN AUTO: 31.9 PG (ref 27–34)
MCHC RBC AUTO-ENTMCNC: 32.1 G/DL (ref 32–36)
MCV RBC AUTO: 99 FL (ref 80–100)
MONOCYTES # BLD AUTO: 0.6 THOU/UL (ref 0–0.9)
MONOCYTES NFR BLD AUTO: 16 % (ref 2–10)
NEUTROPHILS # BLD AUTO: 1.6 THOU/UL (ref 2–7.7)
NEUTROPHILS NFR BLD AUTO: 40 % (ref 50–70)
NITRATE UR QL: NEGATIVE
PH UR STRIP: 5 [PH] (ref 4.5–8)
PLATELET # BLD AUTO: 236 THOU/UL (ref 140–440)
PMV BLD AUTO: 11.3 FL (ref 8.5–12.5)
POTASSIUM BLD-SCNC: 5.1 MMOL/L (ref 3.5–5)
RBC # BLD AUTO: 3.01 MILL/UL (ref 3.8–5.4)
RBC #/AREA URNS AUTO: ABNORMAL HPF
SODIUM SERPL-SCNC: 129 MMOL/L (ref 136–145)
SP GR UR STRIP: 1.01 (ref 1–1.03)
SQUAMOUS #/AREA URNS AUTO: ABNORMAL LPF
UROBILINOGEN UR STRIP-ACNC: ABNORMAL
WBC #/AREA URNS AUTO: ABNORMAL HPF
WBC: 3.9 THOU/UL (ref 4–11)

## 2019-09-12 ENCOUNTER — TELEPHONE (OUTPATIENT)
Dept: ONCOLOGY | Facility: CLINIC | Age: 69
End: 2019-09-12

## 2019-09-12 LAB — BACTERIA SPEC CULT: NO GROWTH

## 2019-09-12 NOTE — TELEPHONE ENCOUNTER
"Patient care facility contacted RN with updates.     Patient has not been well since surgery. She continues to be sluggish and sleepy. She has had some constipation but they are able to manage this. She is still having abdominal pain. She is not eating well and over all seems to be on a decline. Her recent white blood cell count was low and other labs are \"off\" but not \"terrible\". They are further working her up.     RN and care facility discussed normal healing and expectations.     Care facility given MD pager number for further discussion and review of symptoms.     Marisa Harrison RN    "

## 2019-09-13 ENCOUNTER — RECORDS - HEALTHEAST (OUTPATIENT)
Dept: LAB | Facility: CLINIC | Age: 69
End: 2019-09-13

## 2019-09-13 LAB
ANION GAP SERPL CALCULATED.3IONS-SCNC: 9 MMOL/L (ref 5–18)
BASOPHILS # BLD AUTO: 0 THOU/UL (ref 0–0.2)
BASOPHILS NFR BLD AUTO: 1 % (ref 0–2)
BUN SERPL-MCNC: 37 MG/DL (ref 8–22)
CALCIUM SERPL-MCNC: 9.9 MG/DL (ref 8.5–10.5)
CHLORIDE BLD-SCNC: 105 MMOL/L (ref 98–107)
CO2 SERPL-SCNC: 17 MMOL/L (ref 22–31)
CREAT SERPL-MCNC: 1.58 MG/DL (ref 0.6–1.1)
EOSINOPHIL # BLD AUTO: 0.2 THOU/UL (ref 0–0.4)
EOSINOPHIL NFR BLD AUTO: 4 % (ref 0–6)
ERYTHROCYTE [DISTWIDTH] IN BLOOD BY AUTOMATED COUNT: 12.7 % (ref 11–14.5)
GFR SERPL CREATININE-BSD FRML MDRD: 33 ML/MIN/1.73M2
GLUCOSE BLD-MCNC: 119 MG/DL (ref 70–125)
HCT VFR BLD AUTO: 31.3 % (ref 35–47)
HGB BLD-MCNC: 9.8 G/DL (ref 12–16)
LYMPHOCYTES # BLD AUTO: 1.7 THOU/UL (ref 0.8–4.4)
LYMPHOCYTES NFR BLD AUTO: 39 % (ref 20–40)
MCH RBC QN AUTO: 31.5 PG (ref 27–34)
MCHC RBC AUTO-ENTMCNC: 31.3 G/DL (ref 32–36)
MCV RBC AUTO: 101 FL (ref 80–100)
MONOCYTES # BLD AUTO: 0.7 THOU/UL (ref 0–0.9)
MONOCYTES NFR BLD AUTO: 16 % (ref 2–10)
NEUTROPHILS # BLD AUTO: 1.7 THOU/UL (ref 2–7.7)
NEUTROPHILS NFR BLD AUTO: 39 % (ref 50–70)
PLATELET # BLD AUTO: 232 THOU/UL (ref 140–440)
PMV BLD AUTO: 11.5 FL (ref 8.5–12.5)
POTASSIUM BLD-SCNC: 4.9 MMOL/L (ref 3.5–5)
RBC # BLD AUTO: 3.11 MILL/UL (ref 3.8–5.4)
SODIUM SERPL-SCNC: 131 MMOL/L (ref 136–145)
WBC: 4.3 THOU/UL (ref 4–11)

## 2019-09-17 ENCOUNTER — RECORDS - HEALTHEAST (OUTPATIENT)
Dept: LAB | Facility: CLINIC | Age: 69
End: 2019-09-17

## 2019-09-17 LAB
ANION GAP SERPL CALCULATED.3IONS-SCNC: 8 MMOL/L (ref 5–18)
BUN SERPL-MCNC: 41 MG/DL (ref 8–22)
CALCIUM SERPL-MCNC: 9.9 MG/DL (ref 8.5–10.5)
CHLORIDE BLD-SCNC: 106 MMOL/L (ref 98–107)
CO2 SERPL-SCNC: 18 MMOL/L (ref 22–31)
CREAT SERPL-MCNC: 1.72 MG/DL (ref 0.6–1.1)
GFR SERPL CREATININE-BSD FRML MDRD: 30 ML/MIN/1.73M2
GLUCOSE BLD-MCNC: 117 MG/DL (ref 70–125)
POTASSIUM BLD-SCNC: 5 MMOL/L (ref 3.5–5)
SODIUM SERPL-SCNC: 132 MMOL/L (ref 136–145)

## 2019-09-19 ENCOUNTER — RECORDS - HEALTHEAST (OUTPATIENT)
Dept: LAB | Facility: CLINIC | Age: 69
End: 2019-09-19

## 2019-09-20 LAB
ANION GAP SERPL CALCULATED.3IONS-SCNC: 7 MMOL/L (ref 5–18)
BUN SERPL-MCNC: 32 MG/DL (ref 8–22)
CALCIUM SERPL-MCNC: 9.7 MG/DL (ref 8.5–10.5)
CHLORIDE BLD-SCNC: 105 MMOL/L (ref 98–107)
CO2 SERPL-SCNC: 20 MMOL/L (ref 22–31)
CREAT SERPL-MCNC: 1.33 MG/DL (ref 0.6–1.1)
GFR SERPL CREATININE-BSD FRML MDRD: 40 ML/MIN/1.73M2
GLUCOSE BLD-MCNC: 93 MG/DL (ref 70–125)
POTASSIUM BLD-SCNC: 4.4 MMOL/L (ref 3.5–5)
SODIUM SERPL-SCNC: 132 MMOL/L (ref 136–145)

## 2019-09-25 ENCOUNTER — COMMUNICATION - HEALTHEAST (OUTPATIENT)
Dept: GERIATRICS | Facility: CLINIC | Age: 69
End: 2019-09-25

## 2019-09-26 ENCOUNTER — RECORDS - HEALTHEAST (OUTPATIENT)
Dept: LAB | Facility: CLINIC | Age: 69
End: 2019-09-26

## 2019-09-27 LAB
ANION GAP SERPL CALCULATED.3IONS-SCNC: 9 MMOL/L (ref 5–18)
BUN SERPL-MCNC: 25 MG/DL (ref 8–22)
CALCIUM SERPL-MCNC: 9.9 MG/DL (ref 8.5–10.5)
CHLORIDE BLD-SCNC: 105 MMOL/L (ref 98–107)
CO2 SERPL-SCNC: 20 MMOL/L (ref 22–31)
CREAT SERPL-MCNC: 1.22 MG/DL (ref 0.6–1.1)
GFR SERPL CREATININE-BSD FRML MDRD: 44 ML/MIN/1.73M2
GLUCOSE BLD-MCNC: 107 MG/DL (ref 70–125)
POTASSIUM BLD-SCNC: 4.1 MMOL/L (ref 3.5–5)
SODIUM SERPL-SCNC: 134 MMOL/L (ref 136–145)

## 2019-10-10 ENCOUNTER — COMMUNICATION - HEALTHEAST (OUTPATIENT)
Dept: GERIATRICS | Facility: CLINIC | Age: 69
End: 2019-10-10

## 2019-10-21 ENCOUNTER — OFFICE VISIT (OUTPATIENT)
Dept: OPHTHALMOLOGY | Facility: CLINIC | Age: 69
End: 2019-10-21
Attending: OPHTHALMOLOGY
Payer: COMMERCIAL

## 2019-10-21 DIAGNOSIS — H40.1133 PRIMARY OPEN ANGLE GLAUCOMA OF BOTH EYES, SEVERE STAGE: Primary | ICD-10-CM

## 2019-10-21 PROCEDURE — G0463 HOSPITAL OUTPT CLINIC VISIT: HCPCS | Mod: ZF

## 2019-10-21 ASSESSMENT — CONF VISUAL FIELD
OS_SUPERIOR_TEMPORAL_RESTRICTION: 3
OS_SUPERIOR_NASAL_RESTRICTION: 3
OS_INFERIOR_TEMPORAL_RESTRICTION: 3
OD_NORMAL: 1
OS_INFERIOR_NASAL_RESTRICTION: 1
METHOD: COUNTING FINGERS

## 2019-10-21 ASSESSMENT — SLIT LAMP EXAM - LIDS
COMMENTS: NORMAL
COMMENTS: NORMAL

## 2019-10-21 ASSESSMENT — VISUAL ACUITY
CORRECTION_TYPE: GLASSES
METHOD: SNELLEN - LINEAR
OD_CC+: -1
OS_CC: 20/125
OD_CC: 20/25

## 2019-10-21 ASSESSMENT — REFRACTION_WEARINGRX
OD_SPHERE: -1.00
OD_AXIS: 170
OD_ADD: +2.50
OS_SPHERE: -0.75
OS_AXIS: 025
OS_ADD: +2.50
OS_CYLINDER: +0.75
OD_CYLINDER: +0.75

## 2019-10-21 ASSESSMENT — TONOMETRY
OS_IOP_MMHG: 17
OD_IOP_MMHG: 18
IOP_METHOD: TONOPEN

## 2019-10-21 ASSESSMENT — CUP TO DISC RATIO
OS_RATIO: 0.8
OD_RATIO: 0.8

## 2019-10-21 NOTE — PROGRESS NOTES
Primary open angle glaucoma (POAG) adv both eyes   [place] both eyes    Right eye 9/05   Left eye 5/04  NAION left eye  Cataract extraction with intraocular lens    Right eye 2-9-10   Left eye 8-14-12  Diabetes mellitus-no retinopathy last time    Current medications:    Latanoprost both eyes at bedtime   Cosopt both eyes twice a day  Pataday as needed for allergy     RV every 6 months to make sure tubes still covered (covered with conjunctiva only)  Cannot do visual field,  OCT with floor effect-would not repeat  Return to clinic 6 months for intraocular pressure and tube check   (dilated exam Diabetes mellitus every spring)  Return to clinic 6 months with dilated exam for Diabetes mellitus     Attending Physician Attestation:  Complete documentation of historical and exam elements from today's encounter can be found in the full encounter summary report (not reduplicated in this progress note). I personally obtained the chief complaint(s) and history of present illness. I confirmed and edited asnecessary the review of systems, past medical/surgical history, family history, social history, and examination findings as documented by others; and I examined the patient myself. I personally reviewed the relevant tests, images, and reports as documented above. I formulated and edited as necessary the assessment and plan and discussed the findings and management plan with the patient and family.  - Bette Delong MD 2:01 PM 10/21/2019

## 2019-10-21 NOTE — NURSING NOTE
"Chief Complaints and History of Present Illnesses   Patient presents with     Glaucoma Follow-Up     6 month follow-up POAG, both eyes     Chief Complaint(s) and History of Present Illness(es)     Glaucoma Follow-Up     Laterality: both eyes    Quality: blurred    Associated symptoms: tearing and eye pain    Treatment side effects: itching    Compliance with Treatment: always    Pain scale: 2/10    Comments: 6 month follow-up POAG, both eyes              Comments     Pt complains of vision being blurry BE.  Complains of having intermittent sharp pain BE which causes headaches x 1 month.  Complains of occasional itching BE and constant tearing BE.  Denies any discharge.  Type 2 DM- checks BS daily, was \"91?\" - checked by nurse where she lives.  Lab Results       Component                Value               Date                       A1C                      6.6                 07/23/2019                 A1C                      7.5                 10/24/2016                 A1C                      7.5                 10/12/2015                 A1C                      8.1                 03/06/2014                 A1C                      7.2                 03/07/2013                Parvin Redding OT 12:51 PM October 21, 2019                   "

## 2019-10-23 ENCOUNTER — TELEPHONE (OUTPATIENT)
Dept: OPHTHALMOLOGY | Facility: CLINIC | Age: 69
End: 2019-10-23

## 2019-10-23 ENCOUNTER — AMBULATORY - HEALTHEAST (OUTPATIENT)
Dept: ADMINISTRATIVE | Facility: CLINIC | Age: 69
End: 2019-10-23

## 2019-10-24 ENCOUNTER — OFFICE VISIT (OUTPATIENT)
Dept: ENDOCRINOLOGY | Facility: CLINIC | Age: 69
End: 2019-10-24
Payer: COMMERCIAL

## 2019-10-24 DIAGNOSIS — E11.51 DIABETES MELLITUS WITH PERIPHERAL VASCULAR DISEASE (H): ICD-10-CM

## 2019-10-24 DIAGNOSIS — B35.1 DERMATOPHYTOSIS OF NAIL: Primary | ICD-10-CM

## 2019-10-24 DIAGNOSIS — L97.422 ULCER OF LEFT HEEL AND MIDFOOT WITH FAT LAYER EXPOSED (H): ICD-10-CM

## 2019-10-24 DIAGNOSIS — E11.49 TYPE II OR UNSPECIFIED TYPE DIABETES MELLITUS WITH NEUROLOGICAL MANIFESTATIONS, NOT STATED AS UNCONTROLLED(250.60) (H): ICD-10-CM

## 2019-10-24 NOTE — LETTER
10/24/2019       RE: Cristal Preciado  [facility]  [address]  [place] [address]     Dear Colleague,    Thank you for referring your patient, Cristal Preciado, to the [facility] ENDOCRINOLOGY at [facility]. Please see a copy of my visit note below.    Past Medical History:   Diagnosis Date     AION (anterior ischaemic optic neuropathy), left eye     NAION LE     CAD (coronary artery disease) 3/2009    [facility]; Angio 2013 [facility]- normal coronary arteries     Cataract      CVA (cerebral vascular accident) (H)     admitted at [facility]     Depression     on Cymbalta     Diabetes mellitus, type 2 (H)      Diabetic nephropathy (H)      Diabetic neuropathy (H)     severe     Diabetic retinopathy (H)      GERD (gastroesophageal reflux disease)      Hyperlipidemia      Hypertension     ECHO 2013, TDS, NL EF     POAG (primary open-angle glaucoma)     adv BE     Seasonal allergies      Tubular adenoma of colon 2013    repeat colonoscopy in 2018     Patient Active Problem List   Diagnosis     Cataract     CAD (coronary artery disease)     Diabetes mellitus, type 2 (H)     Diabetic nephropathy (H)     Diabetic neuropathy (H)     Diabetic retinopathy (H)     GERD (gastroesophageal reflux disease)     Glaucoma     Hypertension     Hyperlipidemia     CVA (cerebral vascular accident) (H)     Morbid obesity (H)     Anemia     Seasonal allergies     Depression     Tubular adenoma of colon     Leg weakness     Dermatitis, seborrheic     Eczematous dermatitis     History of coronary artery disease     Venous stasis dermatitis of both lower extremities     Uncontrolled type 2 diabetes mellitus (H)     Chronic dermatitis of hands     Neoplasm of uncertain behavior of skin     S/P hysterectomy     Hypomagnesemia     Gout     Diabetic peripheral neuropathy associated with type 2 diabetes mellitus (H)     CKD (chronic kidney disease)     Acute chest pain     Past  Surgical History:   Procedure Laterality Date      SECTION       COLONOSCOPY  7/15/2013    Tubular adenoma; repeat in 2018;Procedure: COMBINED COLONOSCOPY, SINGLE BIOPSY/POLYPECTOMY BY BIOPSY;;  Surgeon: Don King MD;  Tubular adenoma     DAVINCI HYSTERECTOMY TOTAL, BILATERAL SALPINGO-OOPHORECTOMY, COMBINED N/A 2019    Procedure: DaVinci Assisted Total Laparoscopic Hysterectomy, Removal Of Both Tubes And Ovaries;  Surgeon: Linh Cardoso MD;  Location: [facility] OR     EXTRACAPSULAR CATARACT EXTRATION WITH INTRAOCULAR LENS IMPLANT  11-10-09, 2-9-10    11-10-09 Lt, 2-9-10 Rt; Left eye 2012     STENT, CORONARY, DELORES  2009    RCA     Social History     Socioeconomic History     Marital status: Single     Spouse name: Not on file     Number of children: Not on file     Years of education: Not on file     Highest education level: Not on file   Occupational History     Not on file   Social Needs     Financial resource strain: Not on file     Food insecurity:     Worry: Not on file     Inability: Not on file     Transportation needs:     Medical: Not on file     Non-medical: Not on file   Tobacco Use     Smoking status: Former Smoker     Packs/day: 1.50     Years: 45.00     Pack years: 67.50     Types: Cigarettes     Start date: 1968     Last attempt to quit: 3/6/2013     Years since quittin.6     Smokeless tobacco: Never Used   Substance and Sexual Activity     Alcohol use: Not Currently     Drug use: Never     Sexual activity: Not Currently   Lifestyle     Physical activity:     Days per week: Not on file     Minutes per session: Not on file     Stress: Not on file   Relationships     Social connections:     Talks on phone: Not on file     Gets together: Not on file     Attends Restorationism service: Not on file     Active member of club or organization: Not on file     Attends meetings of clubs or organizations: Not on file     Relationship status: Not on file     Intimate partner violence:      Fear of current or ex partner: Not on file     Emotionally abused: Not on file     Physically abused: Not on file     Forced sexual activity: Not on file   Other Topics Concern     Parent/sibling w/ CABG, MI or angioplasty before 65F 55M? Not Asked   Social History Narrative    Pt has three daughters and two sons, single.  Her daughter Court, see's her often at the [facility] ([facility]  Moved into Nursing Home in October 2011 after a hospitalization for ALY.  Moved from [place] in 2002 to [facility]. Has 5 grandchildren.     Family History   Problem Relation Age of Onset     Hypertension Mother      Cerebrovascular Disease Mother      Glaucoma Father      Cancer Father      Diabetes Sister      Glaucoma Sister      Cancer - colorectal Other      Cerebrovascular Disease Other      Skin Cancer No family hx of      Melanoma No family hx of      Anesthesia Reaction No family hx of      Deep Vein Thrombosis (DVT) No family hx of    A1c                   6.5                 5/24/2019    SUBJECTIVE FINDINGS:  This 69-year-old female returns to clinic for onychomycosis bilaterally. She presents in a wheelchair. She relates she has a sore on her left heel that she thinks is healed. She relates that they have been putting barrier cream on it. It started after she was hospitalized for surgery. She relates it does hurt a little bit today. Relates no specific injuries, relates no other relieving or aggravating factors. She relates she does have numbness, tingling and neuropathy in her feet, presents in her wheelchair.       OBJECTIVE FINDINGS:  DP and PT are 1/4 bilaterally. She has decreased hair growth bilaterally. She has some peripheral edema bilaterally. She has incurvated nails with some thickening, subungual debris, dystrophy and discoloration bilaterally to differing degrees and she has a left plantar heel ulceration that is about 3.5 cm in diameter with ulceration that is deep through the dermis into the  subcutaneous tissues at its deepest point. There are areas of weeping, patchy areas of healing and open wounds. There is no odor, no calor, mild edema. I tried to put a picture through the Haiku luis today but I do not have service here.       ASSESSMENT AND PLAN:  Onychomycosis bilaterally, ulcer left heel. She is diabetic with peripheral neuropathy and vascular disease. Diagnosis and treatment options discussed with her. All the nails were reduced or debrided bilaterally upon consent. The left heel I applied Margret and a Mepilex border dressing and Betadine. I discussed Betadine and Mepilex border dressings and I want the care facility to clean this daily, apply Betadine and a Mepilex border dressing daily. She is using a heel offloading boot. She will continue that. Return to clinic to see me in 2-3 weeks.         Again, thank you for allowing me to participate in the care of your patient.      Sincerely,    Vu Grant DPM

## 2019-10-24 NOTE — PROGRESS NOTES
Past Medical History:   Diagnosis Date     AION (anterior ischaemic optic neuropathy), left eye     NAION LE     CAD (coronary artery disease) 3/2009    [facility]; Angio  [facility]- normal coronary arteries     Cataract      CVA (cerebral vascular accident) (H)     admitted at [facility]     on Cymbalta     Diabetes mellitus, type 2 (H)      Diabetic nephropathy (H)      Diabetic neuropathy (H)     severe     Diabetic retinopathy (H)      GERD (gastroesophageal reflux disease)      Hyperlipidemia      Hypertension     ECHO 2013, TDS, NL EF     POAG (primary open-angle glaucoma)     adv BE     Seasonal allergies      Tubular adenoma of colon 2013    repeat colonoscopy in 2018     Patient Active Problem List   Diagnosis     Cataract     CAD (coronary artery disease)     Diabetes mellitus, type 2 (H)     Diabetic nephropathy (H)     Diabetic neuropathy (H)     Diabetic retinopathy (H)     GERD (gastroesophageal reflux disease)     Glaucoma     Hypertension     Hyperlipidemia     CVA (cerebral vascular accident) (H)     Morbid obesity (H)     Anemia     Seasonal allergies     Depression     Tubular adenoma of colon     Leg weakness     Dermatitis, seborrheic     Eczematous dermatitis     History of coronary artery disease     Venous stasis dermatitis of both lower extremities     Uncontrolled type 2 diabetes mellitus (H)     Chronic dermatitis of hands     Neoplasm of uncertain behavior of skin     S/P hysterectomy     Hypomagnesemia     Gout     Diabetic peripheral neuropathy associated with type 2 diabetes mellitus (H)     CKD (chronic kidney disease)     Acute chest pain     Past Surgical History:   Procedure Laterality Date      SECTION       COLONOSCOPY  7/15/2013    Tubular adenoma; repeat in ;Procedure: COMBINED COLONOSCOPY, SINGLE BIOPSY/POLYPECTOMY BY BIOPSY;;  Surgeon: Don King MD;  Tubular adenoma     DAVINCI HYSTERECTOMY TOTAL, BILATERAL SALPINGO-OOPHORECTOMY,  COMBINED N/A 2019    Procedure: DaVinci Assisted Total Laparoscopic Hysterectomy, Removal Of Both Tubes And Ovaries;  Surgeon: Linh Cardoso MD;  Location: [facility] OR     EXTRACAPSULAR CATARACT EXTRATION WITH INTRAOCULAR LENS IMPLANT  11-10-09, 2-9-10    11-10-09 Lt, 2-9-10 Rt; Left eye 2012     STENT, CORONARY, DELORES  2009    RCA     Social History     Socioeconomic History     Marital status: Single     Spouse name: Not on file     Number of children: Not on file     Years of education: Not on file     Highest education level: Not on file   Occupational History     Not on file   Social Needs     Financial resource strain: Not on file     Food insecurity:     Worry: Not on file     Inability: Not on file     Transportation needs:     Medical: Not on file     Non-medical: Not on file   Tobacco Use     Smoking status: Former Smoker     Packs/day: 1.50     Years: 45.00     Pack years: 67.50     Types: Cigarettes     Start date: 1968     Last attempt to quit: 3/6/2013     Years since quittin.6     Smokeless tobacco: Never Used   Substance and Sexual Activity     Alcohol use: Not Currently     Drug use: Never     Sexual activity: Not Currently   Lifestyle     Physical activity:     Days per week: Not on file     Minutes per session: Not on file     Stress: Not on file   Relationships     Social connections:     Talks on phone: Not on file     Gets together: Not on file     Attends Jain service: Not on file     Active member of club or organization: Not on file     Attends meetings of clubs or organizations: Not on file     Relationship status: Not on file     Intimate partner violence:     Fear of current or ex partner: Not on file     Emotionally abused: Not on file     Physically abused: Not on file     Forced sexual activity: Not on file   Other Topics Concern     Parent/sibling w/ CABG, MI or angioplasty before 65F 55M? Not Asked   Social History Narrative    Pt has three daughters and two sons,  single.  Her daughter Court, see's her often at the Nursing Home ([facility]).  Moved into Nursing Home in October 2011 after a hospitalization for ALY.  Moved from [place] in 2002 to [facility]. Has 5 grandchildren.     Family History   Problem Relation Age of Onset     Hypertension Mother      Cerebrovascular Disease Mother      Glaucoma Father      Cancer Father      Diabetes Sister      Glaucoma Sister      Cancer - colorectal Other      Cerebrovascular Disease Other      Skin Cancer No family hx of      Melanoma No family hx of      Anesthesia Reaction No family hx of      Deep Vein Thrombosis (DVT) No family hx of    A1c                   6.5                 5/24/2019    SUBJECTIVE FINDINGS:  This 69-year-old female returns to clinic for onychomycosis bilaterally. She presents in a wheelchair. She relates she has a sore on her left heel that she thinks is healed. She relates that they have been putting barrier cream on it. It started after she was hospitalized for surgery. She relates it does hurt a little bit today. Relates no specific injuries, relates no other relieving or aggravating factors. She relates she does have numbness, tingling and neuropathy in her feet, presents in her wheelchair.       OBJECTIVE FINDINGS:  DP and PT are 1/4 bilaterally. She has decreased hair growth bilaterally. She has some peripheral edema bilaterally. She has incurvated nails with some thickening, subungual debris, dystrophy and discoloration bilaterally to differing degrees and she has a left plantar heel ulceration that is about 3.5 cm in diameter with ulceration that is deep through the dermis into the subcutaneous tissues at its deepest point. There are areas of weeping, patchy areas of healing and open wounds. There is no odor, no calor, mild edema. I tried to put a picture through the Haiku luis today but I do not have service here.       ASSESSMENT AND PLAN:  Onychomycosis bilaterally, ulcer left heel. She is  diabetic with peripheral neuropathy and vascular disease. Diagnosis and treatment options discussed with her. All the nails were reduced or debrided bilaterally upon consent. The left heel I applied Margret and a Mepilex border dressing and Betadine. I discussed Betadine and Mepilex border dressings and I want the care facility to clean this daily, apply Betadine and a Mepilex border dressing daily. She is using a heel offloading boot. She will continue that. Return to clinic to see me in 2-3 weeks.

## 2019-10-25 ENCOUNTER — DOCUMENTATION ONLY (OUTPATIENT)
Dept: CARE COORDINATION | Facility: CLINIC | Age: 69
End: 2019-10-25

## 2019-10-28 ENCOUNTER — COMMUNICATION - HEALTHEAST (OUTPATIENT)
Dept: CARE COORDINATION | Facility: HOSPITAL | Age: 69
End: 2019-10-28

## 2019-10-29 ENCOUNTER — OFFICE VISIT - HEALTHEAST (OUTPATIENT)
Dept: GERIATRICS | Facility: CLINIC | Age: 69
End: 2019-10-29

## 2019-10-29 DIAGNOSIS — Z86.73 HISTORY OF STROKE: ICD-10-CM

## 2019-10-29 DIAGNOSIS — G62.9 NEUROPATHY: ICD-10-CM

## 2019-10-29 DIAGNOSIS — I10 ESSENTIAL HYPERTENSION: ICD-10-CM

## 2019-11-11 ENCOUNTER — OFFICE VISIT (OUTPATIENT)
Dept: ORTHOPEDICS | Facility: CLINIC | Age: 69
End: 2019-11-11
Payer: COMMERCIAL

## 2019-11-11 DIAGNOSIS — Z87.2 HISTORY OF FOOT ULCER: ICD-10-CM

## 2019-11-11 DIAGNOSIS — E11.49 TYPE II OR UNSPECIFIED TYPE DIABETES MELLITUS WITH NEUROLOGICAL MANIFESTATIONS, NOT STATED AS UNCONTROLLED(250.60) (H): Primary | ICD-10-CM

## 2019-11-11 DIAGNOSIS — E11.51 DIABETES MELLITUS WITH PERIPHERAL VASCULAR DISEASE (H): ICD-10-CM

## 2019-11-11 NOTE — LETTER
11/11/2019       RE: Cristal Preciado  [facility]  [address]  [place] [address]     Dear Colleague,    Thank you for referring your patient, Cristal Preciado, to the [facility] ORTHOPAEDIC CLINIC at [facility]. Please see a copy of my visit note below.    Past Medical History:   Diagnosis Date     AION (anterior ischaemic optic neuropathy), left eye     NAION LE     CAD (coronary artery disease) 3/2009    [facility]; Angio 2013 [facility]- normal coronary arteries     Cataract      CVA (cerebral vascular accident) (H)     admitted at [facility]     Depression     on Cymbalta     Diabetes mellitus, type 2 (H)      Diabetic nephropathy (H)      Diabetic neuropathy (H)     severe     Diabetic retinopathy (H)      GERD (gastroesophageal reflux disease)      Hyperlipidemia      Hypertension     ECHO 2013, TDS, NL EF     POAG (primary open-angle glaucoma)     adv BE     Seasonal allergies      Tubular adenoma of colon 2013    repeat colonoscopy in 2018     Patient Active Problem List   Diagnosis     Cataract     CAD (coronary artery disease)     Diabetes mellitus, type 2 (H)     Diabetic nephropathy (H)     Diabetic neuropathy (H)     Diabetic retinopathy (H)     GERD (gastroesophageal reflux disease)     Glaucoma     Hypertension     Hyperlipidemia     CVA (cerebral vascular accident) (H)     Morbid obesity (H)     Anemia     Seasonal allergies     Depression     Tubular adenoma of colon     Leg weakness     Dermatitis, seborrheic     Eczematous dermatitis     History of coronary artery disease     Venous stasis dermatitis of both lower extremities     Uncontrolled type 2 diabetes mellitus (H)     Chronic dermatitis of hands     Neoplasm of uncertain behavior of skin     S/P hysterectomy     Hypomagnesemia     Gout     Diabetic peripheral neuropathy associated with type 2 diabetes mellitus (H)     CKD (chronic kidney disease)     Acute chest pain     Past  Surgical History:   Procedure Laterality Date      SECTION       COLONOSCOPY  7/15/2013    Tubular adenoma; repeat in 2018;Procedure: COMBINED COLONOSCOPY, SINGLE BIOPSY/POLYPECTOMY BY BIOPSY;;  Surgeon: Don King MD;  Tubular adenoma     DAVINCI HYSTERECTOMY TOTAL, BILATERAL SALPINGO-OOPHORECTOMY, COMBINED N/A 2019    Procedure: DaVinci Assisted Total Laparoscopic Hysterectomy, Removal Of Both Tubes And Ovaries;  Surgeon: Linh Cardoso MD;  Location: [facility] OR     EXTRACAPSULAR CATARACT EXTRATION WITH INTRAOCULAR LENS IMPLANT  11-10-09, 2-9-10    11-10-09 Lt, 2-9-10 Rt; Left eye 2012     STENT, CORONARY, DELORES  2009    RCA     Social History     Socioeconomic History     Marital status: Single     Spouse name: Not on file     Number of children: Not on file     Years of education: Not on file     Highest education level: Not on file   Occupational History     Not on file   Social Needs     Financial resource strain: Not on file     Food insecurity:     Worry: Not on file     Inability: Not on file     Transportation needs:     Medical: Not on file     Non-medical: Not on file   Tobacco Use     Smoking status: Former Smoker     Packs/day: 1.50     Years: 45.00     Pack years: 67.50     Types: Cigarettes     Start date: 1968     Last attempt to quit: 3/6/2013     Years since quittin.6     Smokeless tobacco: Never Used   Substance and Sexual Activity     Alcohol use: Not Currently     Drug use: Never     Sexual activity: Not Currently   Lifestyle     Physical activity:     Days per week: Not on file     Minutes per session: Not on file     Stress: Not on file   Relationships     Social connections:     Talks on phone: Not on file     Gets together: Not on file     Attends Protestant service: Not on file     Active member of club or organization: Not on file     Attends meetings of clubs or organizations: Not on file     Relationship status: Not on file     Intimate partner violence:      Fear of current or ex partner: Not on file     Emotionally abused: Not on file     Physically abused: Not on file     Forced sexual activity: Not on file   Other Topics Concern     Parent/sibling w/ CABG, MI or angioplasty before 65F 55M? Not Asked   Social History Narrative    Pt has three daughters and two sons, single.  Her daughter Court, see's her often at the Nursing [facility] ([facility]).  Moved into Nursing Home in October 2011 after a hospitalization for ALY.  Moved from [place] in 2002 to [facility]. Has 5 grandchildren.     Family History   Problem Relation Age of Onset     Hypertension Mother      Cerebrovascular Disease Mother      Glaucoma Father      Cancer Father      Diabetes Sister      Glaucoma Sister      Cancer - colorectal Other      Cerebrovascular Disease Other      Skin Cancer No family hx of      Melanoma No family hx of      Anesthesia Reaction No family hx of      Deep Vein Thrombosis (DVT) No family hx of      Lab Results   Component Value Date    A1C 6.6 07/23/2019    A1C 7.5 10/24/2016    A1C 7.5 10/12/2015    A1C 8.1 03/06/2014    A1C 7.2 03/07/2013     SUBJECTIVE FINDINGS:  This 69-year-old female returns to clinic for ulcer left heel. She is diabetic with peripheral neuropathy and vascular disease. Presents in her wheelchair. She relates she is doing well. She feels it is doing okay.       OBJECTIVE FINDINGS:  Vascular status DP and PT are 1/4 on the left. She has left heel no erythema, no drainage, no odor, no calor, no open lesions.       ASSESSMENT AND PLAN:  Ulcer left heel. She is diabetic with peripheral neuropathy and vascular disease. This is healed well. She can discontinue local wound care and return to clinic to see me as scheduled for her regular diabetic appointment.           Again, thank you for allowing me to participate in the care of your patient.      Sincerely,    Vu Grant DPM

## 2019-11-11 NOTE — NURSING NOTE
Reason For Visit:   Chief Complaint   Patient presents with     Left Foot - WOUND CARE       Pain Assessment  Patient Currently in Pain: Denies        Allergies   Allergen Reactions     Dust Mites Other (See Comments)     Sneezing runny eyes and nose.     Food Allergy Formula Hives     Mountain Dew and Walnuts     Pollen Extract Other (See Comments)     Sneezing runny eyes and nose.           Mena Harris LPN

## 2019-11-11 NOTE — PROGRESS NOTES
Past Medical History:   Diagnosis Date     AION (anterior ischaemic optic neuropathy), left eye     NAION LE     CAD (coronary artery disease) 3/2009    [facility]; Angio  UM- normal coronary arteries     Cataract      CVA (cerebral vascular accident) (H)     admitted at [facility]     on Cymbalta     Diabetes mellitus, type 2 (H)      Diabetic nephropathy (H)      Diabetic neuropathy (H)     severe     Diabetic retinopathy (H)      GERD (gastroesophageal reflux disease)      Hyperlipidemia      Hypertension     ECHO 2013, TDS, NL EF     POAG (primary open-angle glaucoma)     adv BE     Seasonal allergies      Tubular adenoma of colon 2013    repeat colonoscopy in 2018     Patient Active Problem List   Diagnosis     Cataract     CAD (coronary artery disease)     Diabetes mellitus, type 2 (H)     Diabetic nephropathy (H)     Diabetic neuropathy (H)     Diabetic retinopathy (H)     GERD (gastroesophageal reflux disease)     Glaucoma     Hypertension     Hyperlipidemia     CVA (cerebral vascular accident) (H)     Morbid obesity (H)     Anemia     Seasonal allergies     Depression     Tubular adenoma of colon     Leg weakness     Dermatitis, seborrheic     Eczematous dermatitis     History of coronary artery disease     Venous stasis dermatitis of both lower extremities     Uncontrolled type 2 diabetes mellitus (H)     Chronic dermatitis of hands     Neoplasm of uncertain behavior of skin     S/P hysterectomy     Hypomagnesemia     Gout     Diabetic peripheral neuropathy associated with type 2 diabetes mellitus (H)     CKD (chronic kidney disease)     Acute chest pain     Past Surgical History:   Procedure Laterality Date      SECTION       COLONOSCOPY  7/15/2013    Tubular adenoma; repeat in ;Procedure: COMBINED COLONOSCOPY, SINGLE BIOPSY/POLYPECTOMY BY BIOPSY;;  Surgeon: Don King MD;  Tubular adenoma     DAVINCI HYSTERECTOMY TOTAL, BILATERAL SALPINGO-OOPHORECTOMY,  COMBINED N/A 2019    Procedure: DaVinci Assisted Total Laparoscopic Hysterectomy, Removal Of Both Tubes And Ovaries;  Surgeon: Linh Cardoso MD;  Location: [facility] OR     EXTRACAPSULAR CATARACT EXTRATION WITH INTRAOCULAR LENS IMPLANT  11-10-09, 2-9-10    11-10-09 Lt, 2-9-10 Rt; Left eye 2012     STENT, CORONARY, DELORES  2009    RCA     Social History     Socioeconomic History     Marital status: Single     Spouse name: Not on file     Number of children: Not on file     Years of education: Not on file     Highest education level: Not on file   Occupational History     Not on file   Social Needs     Financial resource strain: Not on file     Food insecurity:     Worry: Not on file     Inability: Not on file     Transportation needs:     Medical: Not on file     Non-medical: Not on file   Tobacco Use     Smoking status: Former Smoker     Packs/day: 1.50     Years: 45.00     Pack years: 67.50     Types: Cigarettes     Start date: 1968     Last attempt to quit: 3/6/2013     Years since quittin.6     Smokeless tobacco: Never Used   Substance and Sexual Activity     Alcohol use: Not Currently     Drug use: Never     Sexual activity: Not Currently   Lifestyle     Physical activity:     Days per week: Not on file     Minutes per session: Not on file     Stress: Not on file   Relationships     Social connections:     Talks on phone: Not on file     Gets together: Not on file     Attends Sabianist service: Not on file     Active member of club or organization: Not on file     Attends meetings of clubs or organizations: Not on file     Relationship status: Not on file     Intimate partner violence:     Fear of current or ex partner: Not on file     Emotionally abused: Not on file     Physically abused: Not on file     Forced sexual activity: Not on file   Other Topics Concern     Parent/sibling w/ CABG, MI or angioplasty before 65F 55M? Not Asked   Social History Narrative    Pt has three daughters and two sons,  single.  Her daughter Court, see's her often at the Nursing Home ([facility]).  Moved into Nursing Home in October 2011 after a hospitalization for ALY.  Moved from [place] in 2002 to [facility]. Has 5 grandchildren.     Family History   Problem Relation Age of Onset     Hypertension Mother      Cerebrovascular Disease Mother      Glaucoma Father      Cancer Father      Diabetes Sister      Glaucoma Sister      Cancer - colorectal Other      Cerebrovascular Disease Other      Skin Cancer No family hx of      Melanoma No family hx of      Anesthesia Reaction No family hx of      Deep Vein Thrombosis (DVT) No family hx of      Lab Results   Component Value Date    A1C 6.6 07/23/2019    A1C 7.5 10/24/2016    A1C 7.5 10/12/2015    A1C 8.1 03/06/2014    A1C 7.2 03/07/2013     SUBJECTIVE FINDINGS:  This 69-year-old female returns to clinic for ulcer left heel. She is diabetic with peripheral neuropathy and vascular disease. Presents in her wheelchair. She relates she is doing well. She feels it is doing okay.       OBJECTIVE FINDINGS:  Vascular status DP and PT are 1/4 on the left. She has left heel no erythema, no drainage, no odor, no calor, no open lesions.       ASSESSMENT AND PLAN:  Ulcer left heel. She is diabetic with peripheral neuropathy and vascular disease. This is healed well. She can discontinue local wound care and return to clinic to see me as scheduled for her regular diabetic appointment.

## 2019-12-12 ENCOUNTER — OFFICE VISIT - HEALTHEAST (OUTPATIENT)
Dept: GERIATRICS | Facility: CLINIC | Age: 69
End: 2019-12-12

## 2019-12-12 ENCOUNTER — MEDICAL CORRESPONDENCE (OUTPATIENT)
Dept: HEALTH INFORMATION MANAGEMENT | Facility: CLINIC | Age: 69
End: 2019-12-12

## 2019-12-12 DIAGNOSIS — G62.9 NEUROPATHY: ICD-10-CM

## 2019-12-12 DIAGNOSIS — I10 ESSENTIAL HYPERTENSION: ICD-10-CM

## 2019-12-12 DIAGNOSIS — R52 PAIN MANAGEMENT: ICD-10-CM

## 2019-12-16 ENCOUNTER — TELEPHONE (OUTPATIENT)
Dept: GASTROENTEROLOGY | Facility: CLINIC | Age: 69
End: 2019-12-16

## 2020-01-06 ENCOUNTER — OFFICE VISIT (OUTPATIENT)
Dept: ENDOCRINOLOGY | Facility: CLINIC | Age: 70
End: 2020-01-06
Payer: COMMERCIAL

## 2020-01-06 VITALS
BODY MASS INDEX: 35.69 KG/M2 | SYSTOLIC BLOOD PRESSURE: 151 MMHG | WEIGHT: 201.5 LBS | DIASTOLIC BLOOD PRESSURE: 80 MMHG | HEART RATE: 62 BPM

## 2020-01-06 DIAGNOSIS — E11.9 WELL CONTROLLED TYPE 2 DIABETES MELLITUS (H): Primary | ICD-10-CM

## 2020-01-06 LAB — HBA1C MFR BLD: 6.6 % (ref 4.3–6)

## 2020-01-06 ASSESSMENT — PAIN SCALES - GENERAL: PAINLEVEL: NO PAIN (0)

## 2020-01-06 NOTE — LETTER
1/6/2020       RE: Cristal Preciado  [facility]  [address]  [place] [address]     Dear Colleague,    Thank you for referring your patient, Cristal Preciado, to the [facility] ENDOCRINOLOGY at [facility]. Please see a copy of my visit note below.    HPI   Ms. Preciado returns for follow up of long standing type 2 diabetes.  At her last visit, her insulin requirements came down, so we switched her from U-500 to Toujeo insulin.  She likes this because it is only once a day.  She is concerned because she has been losing weight and she is not sure why.  She will be having a colonoscopy on 1/24 and wonders about how to manage her diabetes that day.     She is currently being managed on Toujeo 90 units every morning (switched from U-500 in 2019) and Metformin 1000 mg daily (added in 2018), Victoza 1.8 mg daily (several years).  She likes this much better than her previous regimen.  Her insulin requirements have slowly decreased since the addition of Metformin.  She has had no hypoglycemia on this new regimen.      Her caretakers at [facility] have been testing her blood sugars 4 times a day. AM readings are the highest- around 122-157.  Lunch readings are 171-357 mg/dL,  Dinner- 113-169, HS- 140-228 mg/dL.  She no longer needs a snack at HS to prevent overnight hypoglycemia.   She is also on liraglutide 1.8 mg daily.      She is taking gabapentin for neuropathy.  She is also on Cymbalta.  Neuropathy in feet better. She sees Dr. Grant for her feet.  She has not been eating as much.   She otherwise has been feeling well and in her usual state of health. She has no other concerns today.     ROS   GENERAL: Gradual weight loss.  She weighed 201# most recently. No fevers, chills,  night sweats.  HEENT: no dysphagia, diplopia, neck pain or tenderness, dry/scratchy eyes, URI, cough, sinus drainage, tinnitus, sinus pressure.   CV: No CP, SOB.  No  "palpitations, skipped beats, LOC.  LUNGS: no cough, sputum production, wheezing   ABDOMEN: no diarrhea, constipation, abdominal pain  EXTREMITIES:  She has no more pedal edema.  Hands still swollen, but having less swelling in her ankles and feet.  Has thickened toenails, not cutting into skin.  No pain. She sees Dr. Grant.  Dry skin.  Blister on left heel.     NEUROLOGY: no changes in vision.  Continued tingling and numbness in hands and feet.   MSK: weakness in legs after stroke.  Needs help getting out of wheelchair. Does not use walker any more due to weakness.      PMH   Type 2 diabetes  Neuropathy   Nephropathy  Stroke - mainly in wheelchair. Would like to start to walk again.    CAD, s/p stent   HTN  Dyslipidemia  Cataracts  Glaucoma  GERD    Family Hx:   Mom- stroke  Dad- cancer  1 of 12 children  2 brothers and 2 sisters-  cancer- unsure of type  1 sister with diabetes, on insulin  5 children  Son- MS, milder  Daughter, April- MS  Other kids- healthy  6 grandchildren    Social Hx:   Ms. Preciado lives at [facility].  She keeps herself busy with many activities in the day, exercises (\"light and lively\"), crafts, coloring, baking, light bowling. She has many friends in their 90's there and she enjoys their company. She is seeing her family about 1-2 times a month now.     Has 5 children, all now living in [facility].  6 grandchildren.  Originally from [facility].     Current Medications  Current Outpatient Medications   Medication Sig Dispense Refill     acetaminophen (TYLENOL) 500 MG tablet Take 1,000 mg by mouth 3 times daily Tylenol Extra Strength       ASPIRIN LOW DOSE 81 MG chewable tablet CHEW AND SWALLOW 1 TAB BY MOUTH ONCE DAILY IN THE MORNING  99     atorvastatin (LIPITOR) 80 MG tablet Take 1 tablet by mouth daily. Pt needs to have labs done prior to further refills. (Patient taking differently: Take 80 mg by mouth At Bedtime Pt needs to have labs done prior to " further refills.) 90 tablet 0     carboxymethylcellulose (REFRESH PLUS) 0.5 % SOLN 1 drop 3 times daily as needed.       CHLORTHALIDONE PO Take 25 mg by mouth every morning        dorzolamide-timolol (COSOPT) 2-0.5 % ophthalmic solution Place 1 drop into both eyes 2 times daily       DULoxetine HCl (CYMBALTA PO) Take 60 mg by mouth every morning        Elastic Bandages & Supports (JOBST KNEE HIGH COMPRESSION SM) MISC JOBST 20-30MMHG COMPRESSION SM MISC   To both legs during waking hours daily for leg swelling and venous stasis dermatitis. Do not sleep in stockings. 2 each 3     ferrous sulfate (IRON) 325 (65 FE) MG tablet Take 325 mg by mouth daily (with lunch)        folic acid (FOLVITE) 1 MG tablet Take 1 mg by mouth every morning        gabapentin (NEURONTIN) 600 MG tablet Take 600 mg in AM , 600 mg afternoon and 900 mg at HS. (Patient taking differently: Take 600 mg by mouth 3 times daily ) 90 tablet 1     insulin glargine U-300 (TOUJEO MAX SOLOSTAR) 300 UNIT/ML injection Take 90 units daily. 45 mL 3     insulin pen needle (B-D U/F) 31G X 5 MM Use 5 time(s) per day.  Please dispense as BD Pen Needle Mini U/F 31G x 5  each 11     ketoconazole (NIZORAL) 2 % shampoo To entire wet scalp and ears and then wash off after 5 minutes three times a week. (Patient taking differently: Apply topically twice a week To entire wet scalp and ears and then wash off after 5 minutes two times a week.) 240 mL 11     latanoprost (XALATAN) 0.005 % ophthalmic solution 1 drop At Bedtime. Left eye       liraglutide (VICTOZA) 18 MG/3ML SOLN Inject 1.8 mg Subcutaneous daily. Start with 0.6 mg x 5 days, then increase to 1.2 mg x 5 days, then 1.8 mg thereafter.  Do not advance to higher dose if you have nausea. (Patient taking differently: Inject 1.8 mg Subcutaneous every morning ) 3 Month 3     lisinopril (PRINIVIL,ZESTRIL) 5 MG tablet Take 2 tablets by mouth daily. Take one tab daily/Hold for SBP < 110 (Patient taking differently:  Take 40 mg by mouth every morning Take one tab daily/Hold for SBP < 110) 90 tablet 3     loratadine (CLARITIN) 10 MG tablet Take 10 mg by mouth as needed.       metFORMIN (GLUCOPHAGE-XR) 500 MG 24 hr tablet Take 2 tablets (1,000 mg) by mouth daily (with dinner) (Patient taking differently: Take 1,000 mg by mouth every morning ) 180 tablet 3     metoprolol (LOPRESSOR) 10 mg/mL SUSP Take 100 mg by mouth 2 times daily       nitroFURantoin macrocrystal-monohydrate (MACROBID) 100 MG capsule Take 1 capsule (100 mg) by mouth every 12 hours For total of 5 days       nitroFURantoin macrocrystal-monohydrate (MACROBID) 100 MG capsule Take 1 capsule (100 mg) by mouth 2 times daily 10 capsule 0     nitroGLYCERIN (NITROSTAT) 0.4 MG SL tablet Place 0.4 mg under the tongue every 5 minutes as needed.       olopatadine HCl (PATADAY) 0.2 % SOLN Place 1 drop into both eyes daily as needed For itchy eyes (Patient taking differently: Place 1 drop into both eyes as needed For itchy eyes) 1 Bottle 5     oxyCODONE (ROXICODONE) 5 MG tablet Take 1 tablet (5 mg) by mouth every 6 hours as needed for severe pain 10 tablet 0     Pregabalin (LYRICA PO) Take 75 mg by mouth 3 times daily        ranitidine (ZANTAC) 150 MG tablet Take 150 mg by mouth 2 times daily       senna-docusate (SENOKOT-S/PERICOLACE) 8.6-50 MG tablet Take 2 tablets by mouth 2 times daily as needed for constipation 60 tablet 0     traMADol (ULTRAM) 50 MG tablet Take 1 tablet (50 mg) by mouth as needed (HOLD IF STILL TAKING OXYCODONE FOR POST OP PAIN)       triamcinolone (KENALOG) 0.1 % ointment Apply topically daily To legs under compression stockings for stasis dermatitis discoloration. 60 g 5     Past Medical History:   Diagnosis Date     AION (anterior ischaemic optic neuropathy), left eye     NAION LE     CAD (coronary artery disease) 3/2009    [facility]; Angio 2013 UM- normal coronary arteries     Cataract      CVA (cerebral vascular accident) (H)     admitted at  [facility]     Depression     on Cymbalta     Diabetes mellitus, type 2 (H)      Diabetic nephropathy (H)      Diabetic neuropathy (H)     severe     Diabetic retinopathy (H)      GERD (gastroesophageal reflux disease)      Hyperlipidemia      Hypertension     ECHO 2013, TDS, NL EF     POAG (primary open-angle glaucoma)     adv BE     Seasonal allergies      Tubular adenoma of colon     repeat colonoscopy in        Past Surgical History:   Procedure Laterality Date      SECTION       COLONOSCOPY  7/15/2013    Tubular adenoma; repeat in 2018;Procedure: COMBINED COLONOSCOPY, SINGLE BIOPSY/POLYPECTOMY BY BIOPSY;;  Surgeon: Don King MD;  Tubular adenoma     DAVINCI HYSTERECTOMY TOTAL, BILATERAL SALPINGO-OOPHORECTOMY, COMBINED N/A 2019    Procedure: DaVinci Assisted Total Laparoscopic Hysterectomy, Removal Of Both Tubes And Ovaries;  Surgeon: Linh Cardoso MD;  Location: [facility] OR     EXTRACAPSULAR CATARACT EXTRATION WITH INTRAOCULAR LENS IMPLANT  11-10-09, 2-9-10    11-10-09 Lt, 2-9-10 Rt; Left eye 2012     STENT, CORONARY, DELORES  2009    RCA       Family History   Problem Relation Age of Onset     Hypertension Mother      Cerebrovascular Disease Mother      Glaucoma Father      Cancer Father      Diabetes Sister      Glaucoma Sister      Cancer - colorectal Other      Cerebrovascular Disease Other      Skin Cancer No family hx of      Melanoma No family hx of      Anesthesia Reaction No family hx of      Deep Vein Thrombosis (DVT) No family hx of        Social History     Socioeconomic History     Marital status: Single     Spouse name: Not on file     Number of children: Not on file     Years of education: Not on file     Highest education level: Not on file   Occupational History     Not on file   Social Needs     Financial resource strain: Not on file     Food insecurity:     Worry: Not on file     Inability: Not on file     Transportation needs:     Medical: Not on file      Non-medical: Not on file   Tobacco Use     Smoking status: Former Smoker     Packs/day: 1.50     Years: 45.00     Pack years: 67.50     Types: Cigarettes     Start date: 1968     Last attempt to quit: 3/6/2013     Years since quittin.4     Smokeless tobacco: Never Used   Substance and Sexual Activity     Alcohol use: Not Currently     Drug use: Never     Sexual activity: Not Currently   Lifestyle     Physical activity:     Days per week: Not on file     Minutes per session: Not on file     Stress: Not on file   Relationships     Social connections:     Talks on phone: Not on file     Gets together: Not on file     Attends Scientology service: Not on file     Active member of club or organization: Not on file     Attends meetings of clubs or organizations: Not on file     Relationship status: Not on file     Intimate partner violence:     Fear of current or ex partner: Not on file     Emotionally abused: Not on file     Physically abused: Not on file     Forced sexual activity: Not on file   Other Topics Concern     Parent/sibling w/ CABG, MI or angioplasty before 65F 55M? Not Asked   Social History Narrative    Pt has three daughters and two sons, single.  Her daughter Court, see's her often at the Nursing Home ([facility]).  Moved into Nursing Home in 2011 after a hospitalization for ALY.  Moved from [place] in  to [facility]. Has 5 grandchildren.       Physical Exam   BP (!) 151/80   Pulse 62   Wt 91.4 kg (201 lb 8 oz)   BMI 35.69 kg/m     GENERAL: VSS.  Answering questions appropriately, appears stated age. Sitting in wheelchair.  Alert and talkative.  EXTREMITIES: Legs with TYREL hose. No pedal edema.       RESULTS  Lab Results   Component Value Date    A1C 6.6 (H) 2019    A1C 7.5 (H) 10/24/2016    A1C 7.5 (H) 10/12/2015    A1C 8.1 (H) 2014    A1C 7.2 (H) 2013    HEMOGLOBINA1 6.5 (A) 2019    HEMOGLOBINA1 7.1 (A) 10/10/2018    HEMOGLOBINA1 7.1 (A) 2018     HEMOGLOBINA1 7.3 (A) 06/05/2017    HEMOGLOBINA1 7.3 (A) 06/05/2017       TSH   Date Value Ref Range Status   10/24/2016 1.20 0.40 - 4.00 mU/L Final   10/12/2015 1.18 0.40 - 4.00 mU/L Final   08/21/2014 1.24 0.40 - 4.00 mU/L Final     Comment:     Effective 7/30/2014, the reference range for this assay has changed to reflect   new instrumentation/methodology.     08/05/2013 1.12 0.4 - 5.0 mU/L Final   07/15/2010 0.53 0.4 - 5.0 mU/L Final     T4 Total   Date Value Ref Range Status   10/30/2008 10.6 5.0 - 11.0 ug/dL Final     T4 Free   Date Value Ref Range Status   04/21/2006 1.04 0.70 - 1.85 ng/dL Final   09/23/2005 1.58 0.70 - 1.85 ng/dL Final       ALT   Date Value Ref Range Status   10/12/2015 39 0 - 50 U/L Final   03/06/2014 36 0 - 50 U/L Final   ]    Recent Labs   Lab Test 03/19/18 10/24/16  1301 10/12/15  1626 08/21/14  0935  05/10/12  1022   CHOL 109  --   --  110  --  144   HDL  --   --   --  43*  --  46*   LDL  --  48 51 43   < > 73   TRIG  --   --   --  122  --  123   CHOLHDLRATIO  --   --   --  2.6  --  3.1    < > = values in this interval not displayed.       Lab Results   Component Value Date     08/06/2019      Lab Results   Component Value Date    POTASSIUM 4.8 08/06/2019     Lab Results   Component Value Date    CHLORIDE 104 08/06/2019     Lab Results   Component Value Date    OLAF 8.8 08/06/2019     Lab Results   Component Value Date    CO2 19 08/06/2019     Lab Results   Component Value Date    BUN 24 08/06/2019     Lab Results   Component Value Date    CR 0.98 08/06/2019       GFR Estimate   Date Value Ref Range Status   08/06/2019 59 (L) >60 mL/min/[1.73_m2] Final     Comment:     Non  GFR Calc  Starting 12/18/2018, serum creatinine based estimated GFR (eGFR) will be   calculated using the Chronic Kidney Disease Epidemiology Collaboration   (CKD-EPI) equation.     08/05/2019 50 (L) >60 mL/min/[1.73_m2] Final     Comment:     Non  GFR Calc  Starting 12/18/2018,  serum creatinine based estimated GFR (eGFR) will be   calculated using the Chronic Kidney Disease Epidemiology Collaboration   (CKD-EPI) equation.     07/23/2019 48 (L) >60 mL/min/[1.73_m2] Final     Comment:     Non  GFR Calc  Starting 12/18/2018, serum creatinine based estimated GFR (eGFR) will be   calculated using the Chronic Kidney Disease Epidemiology Collaboration   (CKD-EPI) equation.       GFR Estimate If Black   Date Value Ref Range Status   08/06/2019 69 >60 mL/min/[1.73_m2] Final     Comment:      GFR Calc  Starting 12/18/2018, serum creatinine based estimated GFR (eGFR) will be   calculated using the Chronic Kidney Disease Epidemiology Collaboration   (CKD-EPI) equation.     08/05/2019 58 (L) >60 mL/min/[1.73_m2] Final     Comment:      GFR Calc  Starting 12/18/2018, serum creatinine based estimated GFR (eGFR) will be   calculated using the Chronic Kidney Disease Epidemiology Collaboration   (CKD-EPI) equation.     07/23/2019 56 (L) >60 mL/min/[1.73_m2] Final     Comment:      GFR Calc  Starting 12/18/2018, serum creatinine based estimated GFR (eGFR) will be   calculated using the Chronic Kidney Disease Epidemiology Collaboration   (CKD-EPI) equation.         Lab Results   Component Value Date    MICROL <5 10/12/2015     No results found for: MICROALBUMIN  No results found for: CPEPT, GADAB, ISCAB    Vitamin B12   Date Value Ref Range Status   08/05/2013 506 >210 pg/mL Final     Comment:     Interp: 247-911 = Normal   ]    Most recent eye exam date: : Not Found     Assessment/Plan:      1.  Type 2 diabetes-  Doing quite well with no hypoglycemia on a more simplified regimen.  Advised she skip Metformin and Victoza on the day of colonoscopy, but to reduce Toujeo to 80 units on day of colonscopy.  Otherwise, no changes today.      2.  Risk factors- BP is well controlled.  Will check for microalbuminuria.   On ACE inhibitor.  Creatinine ok.   LDL <100 on statin, but this has not been checked in a couple years. Follows with podiatry.  On gabapentin and cymbalta for neuropathy.  Will check fasting lipids and TSH before next visit.      3. F/U in 3 months with Dr. Herrera, in 6 months with me.       I spent 30 minutes with this patient face to face and explained the conditions and plans (more than 50% of time was counseling/coordination of care, diabetes management, follow up plan for worsening hyper and hypoglycemia). The patient understood and is satisfied with today's visit.     Renetta Washington PA-C, MPAS   [facility]  Department of Medicine  Division of Endocrinology and Diabetes

## 2020-01-06 NOTE — PROGRESS NOTES
HPI   Ms. Preciado returns for follow up of long standing type 2 diabetes.  At her last visit, her insulin requirements came down, so we switched her from U-500 to Toujeo insulin.  She likes this because it is only once a day.  She is concerned because she has been losing weight and she is not sure why.  She will be having a colonoscopy on 1/24 and wonders about how to manage her diabetes that day.     She is currently being managed on Toujeo 90 units every morning (switched from U-500 in 2019) and Metformin 1000 mg daily (added in 2018), Victoza 1.8 mg daily (several years).  She likes this much better than her previous regimen.  Her insulin requirements have slowly decreased since the addition of Metformin.  She has had no hypoglycemia on this new regimen.      Her caretakers at [facility] have been testing her blood sugars 4 times a day. AM readings are the highest- around 122-157.  Lunch readings are 171-357 mg/dL,  Dinner- 113-169, HS- 140-228 mg/dL.  She no longer needs a snack at HS to prevent overnight hypoglycemia.   She is also on liraglutide 1.8 mg daily.      She is taking gabapentin for neuropathy.  She is also on Cymbalta.  Neuropathy in feet better. She sees Dr. Grant for her feet.  She has not been eating as much.   She otherwise has been feeling well and in her usual state of health. She has no other concerns today.     ROS   GENERAL: Gradual weight loss.  She weighed 201# most recently. No fevers, chills,  night sweats.  HEENT: no dysphagia, diplopia, neck pain or tenderness, dry/scratchy eyes, URI, cough, sinus drainage, tinnitus, sinus pressure.   CV: No CP, SOB.  No palpitations, skipped beats, LOC.  LUNGS: no cough, sputum production, wheezing   ABDOMEN: no diarrhea, constipation, abdominal pain  EXTREMITIES:  She has no more pedal edema.  Hands still swollen, but having less swelling in her ankles and feet.  Has thickened toenails, not cutting into skin.  No pain. She sees Dr. Grant.  " Dry skin.  Blister on left heel.     NEUROLOGY: no changes in vision.  Continued tingling and numbness in hands and feet.   MSK: weakness in legs after stroke.  Needs help getting out of wheelchair. Does not use walker any more due to weakness.      PMH   Type 2 diabetes  Neuropathy   Nephropathy  Stroke - mainly in wheelchair. Would like to start to walk again.    CAD, s/p stent   HTN  Dyslipidemia  Cataracts  Glaucoma  GERD    Family Hx:   Mom- stroke  Dad- cancer  1 of 12 children  2 brothers and 2 sisters-  cancer- unsure of type  1 sister with diabetes, on insulin  5 children  Son- MS, milder  Daughter, April- MS  Other kids- healthy  6 grandchildren    Social Hx:   Ms. Preciado lives at [facility].  She keeps herself busy with many activities in the day, exercises (\"light and lively\"), crafts, coloring, baking, light bowling. She has many friends in their 90's there and she enjoys their company. She is seeing her family about 1-2 times a month now.     Has 5 children, all now living in [facility].  6 grandchildren.  Originally from [facility].     Current Medications  Current Outpatient Medications   Medication Sig Dispense Refill     acetaminophen (TYLENOL) 500 MG tablet Take 1,000 mg by mouth 3 times daily Tylenol Extra Strength       ASPIRIN LOW DOSE 81 MG chewable tablet CHEW AND SWALLOW 1 TAB BY MOUTH ONCE DAILY IN THE MORNING  99     atorvastatin (LIPITOR) 80 MG tablet Take 1 tablet by mouth daily. Pt needs to have labs done prior to further refills. (Patient taking differently: Take 80 mg by mouth At Bedtime Pt needs to have labs done prior to further refills.) 90 tablet 0     carboxymethylcellulose (REFRESH PLUS) 0.5 % SOLN 1 drop 3 times daily as needed.       CHLORTHALIDONE PO Take 25 mg by mouth every morning        dorzolamide-timolol (COSOPT) 2-0.5 % ophthalmic solution Place 1 drop into both eyes 2 times daily       DULoxetine HCl (CYMBALTA PO) Take 60 mg by mouth " every morning        Elastic Bandages & Supports (JOBST KNEE HIGH COMPRESSION SM) MISC JOBST 20-30MMHG COMPRESSION SM MISC   To both legs during waking hours daily for leg swelling and venous stasis dermatitis. Do not sleep in stockings. 2 each 3     ferrous sulfate (IRON) 325 (65 FE) MG tablet Take 325 mg by mouth daily (with lunch)        folic acid (FOLVITE) 1 MG tablet Take 1 mg by mouth every morning        gabapentin (NEURONTIN) 600 MG tablet Take 600 mg in AM , 600 mg afternoon and 900 mg at HS. (Patient taking differently: Take 600 mg by mouth 3 times daily ) 90 tablet 1     insulin glargine U-300 (TOUJEO MAX SOLOSTAR) 300 UNIT/ML injection Take 90 units daily. 45 mL 3     insulin pen needle (B-D U/F) 31G X 5 MM Use 5 time(s) per day.  Please dispense as BD Pen Needle Mini U/F 31G x 5  each 11     ketoconazole (NIZORAL) 2 % shampoo To entire wet scalp and ears and then wash off after 5 minutes three times a week. (Patient taking differently: Apply topically twice a week To entire wet scalp and ears and then wash off after 5 minutes two times a week.) 240 mL 11     latanoprost (XALATAN) 0.005 % ophthalmic solution 1 drop At Bedtime. Left eye       liraglutide (VICTOZA) 18 MG/3ML SOLN Inject 1.8 mg Subcutaneous daily. Start with 0.6 mg x 5 days, then increase to 1.2 mg x 5 days, then 1.8 mg thereafter.  Do not advance to higher dose if you have nausea. (Patient taking differently: Inject 1.8 mg Subcutaneous every morning ) 3 Month 3     lisinopril (PRINIVIL,ZESTRIL) 5 MG tablet Take 2 tablets by mouth daily. Take one tab daily/Hold for SBP < 110 (Patient taking differently: Take 40 mg by mouth every morning Take one tab daily/Hold for SBP < 110) 90 tablet 3     loratadine (CLARITIN) 10 MG tablet Take 10 mg by mouth as needed.       metFORMIN (GLUCOPHAGE-XR) 500 MG 24 hr tablet Take 2 tablets (1,000 mg) by mouth daily (with dinner) (Patient taking differently: Take 1,000 mg by mouth every morning ) 180  tablet 3     metoprolol (LOPRESSOR) 10 mg/mL SUSP Take 100 mg by mouth 2 times daily       nitroFURantoin macrocrystal-monohydrate (MACROBID) 100 MG capsule Take 1 capsule (100 mg) by mouth every 12 hours For total of 5 days       nitroFURantoin macrocrystal-monohydrate (MACROBID) 100 MG capsule Take 1 capsule (100 mg) by mouth 2 times daily 10 capsule 0     nitroGLYCERIN (NITROSTAT) 0.4 MG SL tablet Place 0.4 mg under the tongue every 5 minutes as needed.       olopatadine HCl (PATADAY) 0.2 % SOLN Place 1 drop into both eyes daily as needed For itchy eyes (Patient taking differently: Place 1 drop into both eyes as needed For itchy eyes) 1 Bottle 5     oxyCODONE (ROXICODONE) 5 MG tablet Take 1 tablet (5 mg) by mouth every 6 hours as needed for severe pain 10 tablet 0     Pregabalin (LYRICA PO) Take 75 mg by mouth 3 times daily        ranitidine (ZANTAC) 150 MG tablet Take 150 mg by mouth 2 times daily       senna-docusate (SENOKOT-S/PERICOLACE) 8.6-50 MG tablet Take 2 tablets by mouth 2 times daily as needed for constipation 60 tablet 0     traMADol (ULTRAM) 50 MG tablet Take 1 tablet (50 mg) by mouth as needed (HOLD IF STILL TAKING OXYCODONE FOR POST OP PAIN)       triamcinolone (KENALOG) 0.1 % ointment Apply topically daily To legs under compression stockings for stasis dermatitis discoloration. 60 g 5     Past Medical History:   Diagnosis Date     AION (anterior ischaemic optic neuropathy), left eye     NAION LE     CAD (coronary artery disease) 3/2009    [facility]; Angio 2013 [facility]- normal coronary arteries     Cataract      CVA (cerebral vascular accident) (H)     admitted at [facility]     on Cymbalta     Diabetes mellitus, type 2 (H)      Diabetic nephropathy (H)      Diabetic neuropathy (H)     severe     Diabetic retinopathy (H)      GERD (gastroesophageal reflux disease)      Hyperlipidemia      Hypertension     ECHO 2013, TDS, NL EF     POAG (primary open-angle glaucoma)     adv BE      Seasonal allergies      Tubular adenoma of colon     repeat colonoscopy in        Past Surgical History:   Procedure Laterality Date      SECTION       COLONOSCOPY  7/15/2013    Tubular adenoma; repeat in 2018;Procedure: COMBINED COLONOSCOPY, SINGLE BIOPSY/POLYPECTOMY BY BIOPSY;;  Surgeon: Don King MD;  Tubular adenoma     DAVINCI HYSTERECTOMY TOTAL, BILATERAL SALPINGO-OOPHORECTOMY, COMBINED N/A 2019    Procedure: DaVinci Assisted Total Laparoscopic Hysterectomy, Removal Of Both Tubes And Ovaries;  Surgeon: Linh Cardoso MD;  Location: [facility] OR     EXTRACAPSULAR CATARACT EXTRATION WITH INTRAOCULAR LENS IMPLANT  11-10-09, 2-9-10    11-10-09 Lt, 2-9-10 Rt; Left eye 2012     STENT, CORONARY, DELORES  2009    RCA       Family History   Problem Relation Age of Onset     Hypertension Mother      Cerebrovascular Disease Mother      Glaucoma Father      Cancer Father      Diabetes Sister      Glaucoma Sister      Cancer - colorectal Other      Cerebrovascular Disease Other      Skin Cancer No family hx of      Melanoma No family hx of      Anesthesia Reaction No family hx of      Deep Vein Thrombosis (DVT) No family hx of        Social History     Socioeconomic History     Marital status: Single     Spouse name: Not on file     Number of children: Not on file     Years of education: Not on file     Highest education level: Not on file   Occupational History     Not on file   Social Needs     Financial resource strain: Not on file     Food insecurity:     Worry: Not on file     Inability: Not on file     Transportation needs:     Medical: Not on file     Non-medical: Not on file   Tobacco Use     Smoking status: Former Smoker     Packs/day: 1.50     Years: 45.00     Pack years: 67.50     Types: Cigarettes     Start date: 1968     Last attempt to quit: 3/6/2013     Years since quittin.4     Smokeless tobacco: Never Used   Substance and Sexual Activity     Alcohol use: Not Currently      Drug use: Never     Sexual activity: Not Currently   Lifestyle     Physical activity:     Days per week: Not on file     Minutes per session: Not on file     Stress: Not on file   Relationships     Social connections:     Talks on phone: Not on file     Gets together: Not on file     Attends Mandaeism service: Not on file     Active member of club or organization: Not on file     Attends meetings of clubs or organizations: Not on file     Relationship status: Not on file     Intimate partner violence:     Fear of current or ex partner: Not on file     Emotionally abused: Not on file     Physically abused: Not on file     Forced sexual activity: Not on file   Other Topics Concern     Parent/sibling w/ CABG, MI or angioplasty before 65F 55M? Not Asked   Social History Narrative    Pt has three daughters and two sons, single.  Her daughter Court, see's her often at the Nursing Home ([facility]).  Moved into Nursing Home in October 2011 after a hospitalization for ALY.  Moved from [place] in 2002 to [facility]. Has 5 grandchildren.       Physical Exam   BP (!) 151/80   Pulse 62   Wt 91.4 kg (201 lb 8 oz)   BMI 35.69 kg/m    GENERAL: VSS.  Answering questions appropriately, appears stated age. Sitting in wheelchair.  Alert and talkative.  EXTREMITIES: Legs with TYREL hose. No pedal edema.       RESULTS  Lab Results   Component Value Date    A1C 6.6 (H) 07/23/2019    A1C 7.5 (H) 10/24/2016    A1C 7.5 (H) 10/12/2015    A1C 8.1 (H) 03/06/2014    A1C 7.2 (H) 03/07/2013    HEMOGLOBINA1 6.5 (A) 05/24/2019    HEMOGLOBINA1 7.1 (A) 10/10/2018    HEMOGLOBINA1 7.1 (A) 03/16/2018    HEMOGLOBINA1 7.3 (A) 06/05/2017    HEMOGLOBINA1 7.3 (A) 06/05/2017       TSH   Date Value Ref Range Status   10/24/2016 1.20 0.40 - 4.00 mU/L Final   10/12/2015 1.18 0.40 - 4.00 mU/L Final   08/21/2014 1.24 0.40 - 4.00 mU/L Final     Comment:     Effective 7/30/2014, the reference range for this assay has changed to reflect   new  instrumentation/methodology.     08/05/2013 1.12 0.4 - 5.0 mU/L Final   07/15/2010 0.53 0.4 - 5.0 mU/L Final     T4 Total   Date Value Ref Range Status   10/30/2008 10.6 5.0 - 11.0 ug/dL Final     T4 Free   Date Value Ref Range Status   04/21/2006 1.04 0.70 - 1.85 ng/dL Final   09/23/2005 1.58 0.70 - 1.85 ng/dL Final       ALT   Date Value Ref Range Status   10/12/2015 39 0 - 50 U/L Final   03/06/2014 36 0 - 50 U/L Final   ]    Recent Labs   Lab Test 03/19/18 10/24/16  1301 10/12/15  1626 08/21/14  0935  05/10/12  1022   CHOL 109  --   --  110  --  144   HDL  --   --   --  43*  --  46*   LDL  --  48 51 43   < > 73   TRIG  --   --   --  122  --  123   CHOLHDLRATIO  --   --   --  2.6  --  3.1    < > = values in this interval not displayed.       Lab Results   Component Value Date     08/06/2019      Lab Results   Component Value Date    POTASSIUM 4.8 08/06/2019     Lab Results   Component Value Date    CHLORIDE 104 08/06/2019     Lab Results   Component Value Date    OLAF 8.8 08/06/2019     Lab Results   Component Value Date    CO2 19 08/06/2019     Lab Results   Component Value Date    BUN 24 08/06/2019     Lab Results   Component Value Date    CR 0.98 08/06/2019       GFR Estimate   Date Value Ref Range Status   08/06/2019 59 (L) >60 mL/min/[1.73_m2] Final     Comment:     Non  GFR Calc  Starting 12/18/2018, serum creatinine based estimated GFR (eGFR) will be   calculated using the Chronic Kidney Disease Epidemiology Collaboration   (CKD-EPI) equation.     08/05/2019 50 (L) >60 mL/min/[1.73_m2] Final     Comment:     Non  GFR Calc  Starting 12/18/2018, serum creatinine based estimated GFR (eGFR) will be   calculated using the Chronic Kidney Disease Epidemiology Collaboration   (CKD-EPI) equation.     07/23/2019 48 (L) >60 mL/min/[1.73_m2] Final     Comment:     Non  GFR Calc  Starting 12/18/2018, serum creatinine based estimated GFR (eGFR) will be   calculated  using the Chronic Kidney Disease Epidemiology Collaboration   (CKD-EPI) equation.       GFR Estimate If Black   Date Value Ref Range Status   08/06/2019 69 >60 mL/min/[1.73_m2] Final     Comment:      GFR Calc  Starting 12/18/2018, serum creatinine based estimated GFR (eGFR) will be   calculated using the Chronic Kidney Disease Epidemiology Collaboration   (CKD-EPI) equation.     08/05/2019 58 (L) >60 mL/min/[1.73_m2] Final     Comment:      GFR Calc  Starting 12/18/2018, serum creatinine based estimated GFR (eGFR) will be   calculated using the Chronic Kidney Disease Epidemiology Collaboration   (CKD-EPI) equation.     07/23/2019 56 (L) >60 mL/min/[1.73_m2] Final     Comment:      GFR Calc  Starting 12/18/2018, serum creatinine based estimated GFR (eGFR) will be   calculated using the Chronic Kidney Disease Epidemiology Collaboration   (CKD-EPI) equation.         Lab Results   Component Value Date    MICROL <5 10/12/2015     No results found for: MICROALBUMIN  No results found for: CPEPT, GADAB, ISCAB    Vitamin B12   Date Value Ref Range Status   08/05/2013 506 >210 pg/mL Final     Comment:     Interp: 247-911 = Normal   ]    Most recent eye exam date: : Not Found     Assessment/Plan:      1.  Type 2 diabetes-  Doing quite well with no hypoglycemia on a more simplified regimen.  Advised she skip Metformin and Victoza on the day of colonoscopy, but to reduce Toujeo to 80 units on day of colonscopy.  Otherwise, no changes today.      2.  Risk factors- BP is well controlled.  Will check for microalbuminuria.   On ACE inhibitor.  Creatinine ok.  LDL <100 on statin, but this has not been checked in a couple years. Follows with podiatry.  On gabapentin and cymbalta for neuropathy.  Will check fasting lipids and TSH before next visit.      3. F/U in 3 months with Dr. Herrera, in 6 months with me.       I spent 30 minutes with this patient face to face and explained the  conditions and plans (more than 50% of time was counseling/coordination of care, diabetes management, follow up plan for worsening hyper and hypoglycemia). The patient understood and is satisfied with today's visit.     Renetta Washington PA-C, MPAS   [facility]  Department of Medicine  Division of Endocrinology and Diabetes

## 2020-01-07 ENCOUNTER — RECORDS - HEALTHEAST (OUTPATIENT)
Dept: LAB | Facility: CLINIC | Age: 70
End: 2020-01-07

## 2020-01-07 ENCOUNTER — ANCILLARY PROCEDURE (OUTPATIENT)
Dept: MAMMOGRAPHY | Facility: CLINIC | Age: 70
End: 2020-01-07
Attending: FAMILY MEDICINE
Payer: COMMERCIAL

## 2020-01-07 DIAGNOSIS — Z12.31 VISIT FOR SCREENING MAMMOGRAM: ICD-10-CM

## 2020-01-07 LAB
CHOLEST SERPL-MCNC: 118 MG/DL
CREAT UR-MCNC: 31.1 MG/DL
MICROALBUMIN UR-MCNC: 2.08 MG/DL (ref 0–1.99)
MICROALBUMIN/CREAT UR: 66.9 MG/G
TSH SERPL DL<=0.005 MIU/L-ACNC: 1.04 UIU/ML (ref 0.3–5)

## 2020-01-07 PROCEDURE — 77067 SCR MAMMO BI INCL CAD: CPT

## 2020-01-17 ENCOUNTER — TELEPHONE (OUTPATIENT)
Dept: GASTROENTEROLOGY | Facility: CLINIC | Age: 70
End: 2020-01-17

## 2020-01-17 NOTE — TELEPHONE ENCOUNTER
Patient Name: Cristal Preciado   : 1950  MRN: [id number]       :  N/A    Joe Pending    Additional Information regarding appointment:  _____________________________________________________      Patient scheduled for:  Colonoscopy    Indication for procedure. Screening    Sedation Type: C/S    Procedure Provider:  Hernan      Referring ProviderAngelica Valdez    Arrival time verified: .2020    Facility location verified:   [address] [address]  1st Floor, [phone]    [x] N/A for this Pay[place]    Pt meets medical necessity for outpatient procedure in hospital Endoscopy Unit: N/A      History & Physical: [x] N/A    Additional Information: Rosalva Franks RN - Pt's primary care nurse provided the following information.   __________________________________________________      Prep Type:   [x]Golytely  Pharmacy : Has Rx for Golytely et Dulcolax on hand.       Patient taking any blood thinners ? Yes  Anticoagulants or blood thinners:            [x] ASA 81mg  - may continue   ................................................           Electronic implanted medical devices ? No      GI / Hepatology History: Yes: GERD, Obesity, Tubular adenoma of colon      Heart disease ? Yes: CAD, CVA, HTN      Lung disease ? No      Sleep apnea ? No      Diabetic ? Yes  [x] Advised to contact Provider re: DM management pre/lloyd prep      Kidney disease ? CKD:   Hemodialysis: No      Instructions given: [x] Rec'd & Read   [x] Reviewed         Pre procedure teaching completed: [x] Yes - Reviewed      IV Sedation: [x] No questions regarding Sedation as ordered       : Transportation from procedure & responsible adult to be with patient following procedure for a minimum of 6 hrs (Conscious Sedation): [x] [facility] - Pt is a resident in long-term care.  confirmed will have post-procedure  chapo as required.  _______________________________________________    India Fleming RN  [facility]

## 2020-01-24 ENCOUNTER — HOSPITAL ENCOUNTER (OUTPATIENT)
Facility: CLINIC | Age: 70
Discharge: HOME | End: 2020-01-24
Attending: INTERNAL MEDICINE | Admitting: INTERNAL MEDICINE
Payer: COMMERCIAL

## 2020-01-24 VITALS
RESPIRATION RATE: 16 BRPM | OXYGEN SATURATION: 98 % | HEART RATE: 79 BPM | DIASTOLIC BLOOD PRESSURE: 67 MMHG | SYSTOLIC BLOOD PRESSURE: 136 MMHG

## 2020-01-24 LAB
COLONOSCOPY: NORMAL
GLUCOSE BLDC GLUCOMTR-MCNC: 128 MG/DL (ref 70–99)

## 2020-01-24 PROCEDURE — 40000556 ZZH STATISTIC PERIPHERAL IV START W US GUIDANCE

## 2020-01-24 PROCEDURE — G0105 COLORECTAL SCRN; HI RISK IND: HCPCS | Performed by: INTERNAL MEDICINE

## 2020-01-24 PROCEDURE — 82962 GLUCOSE BLOOD TEST: CPT

## 2020-01-24 PROCEDURE — G0500 MOD SEDAT ENDO SERVICE >5YRS: HCPCS | Performed by: INTERNAL MEDICINE

## 2020-01-24 PROCEDURE — 25000132 ZZH RX MED GY IP 250 OP 250 PS 637: Performed by: INTERNAL MEDICINE

## 2020-01-24 PROCEDURE — 45378 DIAGNOSTIC COLONOSCOPY: CPT | Performed by: INTERNAL MEDICINE

## 2020-01-24 PROCEDURE — 99153 MOD SED SAME PHYS/QHP EA: CPT | Performed by: INTERNAL MEDICINE

## 2020-01-24 PROCEDURE — 25000128 H RX IP 250 OP 636: Performed by: INTERNAL MEDICINE

## 2020-01-24 RX ORDER — ONDANSETRON 4 MG/1
4 TABLET, ORALLY DISINTEGRATING ORAL EVERY 6 HOURS PRN
Status: DISCONTINUED | OUTPATIENT
Start: 2020-01-24 | End: 2020-01-24 | Stop reason: HOSPADM

## 2020-01-24 RX ORDER — SIMETHICONE
LIQUID (ML) MISCELLANEOUS PRN
Status: DISCONTINUED | OUTPATIENT
Start: 2020-01-24 | End: 2020-01-24 | Stop reason: HOSPADM

## 2020-01-24 RX ORDER — LIDOCAINE 40 MG/G
CREAM TOPICAL
Status: DISCONTINUED | OUTPATIENT
Start: 2020-01-24 | End: 2020-01-24 | Stop reason: HOSPADM

## 2020-01-24 RX ORDER — FENTANYL CITRATE 50 UG/ML
INJECTION, SOLUTION INTRAMUSCULAR; INTRAVENOUS PRN
Status: DISCONTINUED | OUTPATIENT
Start: 2020-01-24 | End: 2020-01-24 | Stop reason: HOSPADM

## 2020-01-24 RX ORDER — ONDANSETRON 2 MG/ML
4 INJECTION INTRAMUSCULAR; INTRAVENOUS EVERY 6 HOURS PRN
Status: DISCONTINUED | OUTPATIENT
Start: 2020-01-24 | End: 2020-01-24 | Stop reason: HOSPADM

## 2020-01-24 RX ORDER — FLUMAZENIL 0.1 MG/ML
0.2 INJECTION, SOLUTION INTRAVENOUS
Status: DISCONTINUED | OUTPATIENT
Start: 2020-01-24 | End: 2020-01-24 | Stop reason: HOSPADM

## 2020-01-24 RX ORDER — ONDANSETRON 2 MG/ML
4 INJECTION INTRAMUSCULAR; INTRAVENOUS
Status: DISCONTINUED | OUTPATIENT
Start: 2020-01-24 | End: 2020-01-24 | Stop reason: HOSPADM

## 2020-01-24 RX ORDER — NALOXONE HYDROCHLORIDE 0.4 MG/ML
.1-.4 INJECTION, SOLUTION INTRAMUSCULAR; INTRAVENOUS; SUBCUTANEOUS
Status: DISCONTINUED | OUTPATIENT
Start: 2020-01-24 | End: 2020-01-24 | Stop reason: HOSPADM

## 2020-01-24 NOTE — OR NURSING
Colonoscopy with no interventions completed under conscious sedation. Pt on 2-4 L NC oxygen for duration of procedure. Pt tolerated procedure.

## 2020-02-12 ENCOUNTER — OFFICE VISIT - HEALTHEAST (OUTPATIENT)
Dept: GERIATRICS | Facility: CLINIC | Age: 70
End: 2020-02-12

## 2020-02-12 DIAGNOSIS — F32.A DEPRESSION, UNSPECIFIED DEPRESSION TYPE: ICD-10-CM

## 2020-02-12 DIAGNOSIS — I10 ESSENTIAL HYPERTENSION: ICD-10-CM

## 2020-02-12 DIAGNOSIS — E11.42 DIABETIC PERIPHERAL NEUROPATHY ASSOCIATED WITH TYPE 2 DIABETES MELLITUS (H): ICD-10-CM

## 2020-02-18 ENCOUNTER — TELEPHONE (OUTPATIENT)
Dept: OPHTHALMOLOGY | Facility: CLINIC | Age: 70
End: 2020-02-18

## 2020-02-18 DIAGNOSIS — H40.1130 PRIMARY OPEN ANGLE GLAUCOMA (POAG) OF BOTH EYES: Primary | ICD-10-CM

## 2020-02-18 RX ORDER — DORZOLAMIDE HYDROCHLORIDE AND TIMOLOL MALEATE 20; 5 MG/ML; MG/ML
1 SOLUTION/ DROPS OPHTHALMIC 2 TIMES DAILY
Qty: 1 BOTTLE | Refills: 11 | Status: SHIPPED | OUTPATIENT
Start: 2020-02-18 | End: 2022-01-01

## 2020-02-18 NOTE — TELEPHONE ENCOUNTER
[facility] Call Center    Phone Message    May a detailed message be left on voicemail: yes     Reason for Call: Medication Refill Request    Has the patient contacted the pharmacy for the refill? Yes   Name of medication being requested: dorzolamide  Provider who prescribed the medication: Dr. Delong  Pharmacy: [facility] in [place] - Fax: [phone]  Date medication is needed: 02/18         Action Taken: Message routed to:  [facility]    Travel Screening: Not Applicable

## 2020-02-24 ENCOUNTER — OFFICE VISIT (OUTPATIENT)
Dept: ORTHOPEDICS | Facility: CLINIC | Age: 70
End: 2020-02-24
Payer: COMMERCIAL

## 2020-02-24 DIAGNOSIS — E11.51 DIABETES MELLITUS WITH PERIPHERAL VASCULAR DISEASE (H): ICD-10-CM

## 2020-02-24 DIAGNOSIS — Z87.2 HISTORY OF FOOT ULCER: ICD-10-CM

## 2020-02-24 DIAGNOSIS — E11.49 TYPE II OR UNSPECIFIED TYPE DIABETES MELLITUS WITH NEUROLOGICAL MANIFESTATIONS, NOT STATED AS UNCONTROLLED(250.60) (H): Primary | ICD-10-CM

## 2020-02-24 DIAGNOSIS — B35.1 OM (ONYCHOMYCOSIS): ICD-10-CM

## 2020-02-24 NOTE — LETTER
2/24/2020       RE: Cristal Preciado  [facility]  [address]  [place] [address]     Dear Colleague,    Thank you for referring your patient, Cristal Preciado, to the [facility] at [facility]. Please see a copy of my visit note below.    Past Medical History:   Diagnosis Date     AION (anterior ischaemic optic neuropathy), left eye     NAION LE     CAD (coronary artery disease) 3/2009    [facility]; Angio 2013 UM- normal coronary arteries     Cataract      CVA (cerebral vascular accident) (H)     admitted at [facility]     Depression     on Cymbalta     Diabetes mellitus, type 2 (H)      Diabetic nephropathy (H)      Diabetic neuropathy (H)     severe     Diabetic retinopathy (H)      GERD (gastroesophageal reflux disease)      Hyperlipidemia      Hypertension     ECHO 2013, TDS, NL EF     POAG (primary open-angle glaucoma)     adv BE     Seasonal allergies      Tubular adenoma of colon 2013    repeat colonoscopy in 2018     Patient Active Problem List   Diagnosis     Cataract     CAD (coronary artery disease)     Diabetes mellitus, type 2 (H)     Diabetic nephropathy (H)     Diabetic neuropathy (H)     Diabetic retinopathy (H)     GERD (gastroesophageal reflux disease)     Glaucoma     Hypertension     Hyperlipidemia     CVA (cerebral vascular accident) (H)     Morbid obesity (H)     Anemia     Seasonal allergies     Depression     Tubular adenoma of colon     Leg weakness     Dermatitis, seborrheic     Eczematous dermatitis     History of coronary artery disease     Venous stasis dermatitis of both lower extremities     Uncontrolled type 2 diabetes mellitus (H)     Chronic dermatitis of hands     Neoplasm of uncertain behavior of skin     S/P hysterectomy     Hypomagnesemia     Gout     Diabetic peripheral neuropathy associated with type 2 diabetes mellitus (H)     CKD (chronic kidney disease)     Acute chest pain     Past  Surgical History:   Procedure Laterality Date      SECTION       COLONOSCOPY  7/15/2013    Tubular adenoma; repeat in 2018;Procedure: COMBINED COLONOSCOPY, SINGLE BIOPSY/POLYPECTOMY BY BIOPSY;;  Surgeon: Don King MD;  Tubular adenoma     COLONOSCOPY N/A 2020    Procedure: COLONOSCOPY;  Surgeon: Sid Amanda MD;  Location:  GI     DAVINCI HYSTERECTOMY TOTAL, BILATERAL SALPINGO-OOPHORECTOMY, COMBINED N/A 2019    Procedure: DaVinci Assisted Total Laparoscopic Hysterectomy, Removal Of Both Tubes And Ovaries;  Surgeon: Linh Cardoso MD;  Location:  OR     EXTRACAPSULAR CATARACT EXTRATION WITH INTRAOCULAR LENS IMPLANT  11-10-09, 2-9-10    11-10-09 Lt, 2-9-10 Rt; Left eye 2012     STENT, CORONARY, DELORES  2009    RCA     Social History     Socioeconomic History     Marital status: Single     Spouse name: Not on file     Number of children: Not on file     Years of education: Not on file     Highest education level: Not on file   Occupational History     Not on file   Social Needs     Financial resource strain: Not on file     Food insecurity:     Worry: Not on file     Inability: Not on file     Transportation needs:     Medical: Not on file     Non-medical: Not on file   Tobacco Use     Smoking status: Former Smoker     Packs/day: 1.50     Years: 45.00     Pack years: 67.50     Types: Cigarettes     Start date: 1968     Last attempt to quit: 3/6/2013     Years since quittin.9     Smokeless tobacco: Never Used   Substance and Sexual Activity     Alcohol use: Not Currently     Drug use: Never     Sexual activity: Not Currently   Lifestyle     Physical activity:     Days per week: Not on file     Minutes per session: Not on file     Stress: Not on file   Relationships     Social connections:     Talks on phone: Not on file     Gets together: Not on file     Attends [employer] service: Not on file     Active member of club or organization: Not on file     Attends  meetings of clubs or organizations: Not on file     Relationship status: Not on file     Intimate partner violence:     Fear of current or ex partner: Not on file     Emotionally abused: Not on file     Physically abused: Not on file     Forced sexual activity: Not on file   Other Topics Concern     Parent/sibling w/ CABG, MI or angioplasty before 65F 55M? Not Asked   Social History Narrative    Pt has three daughters and two sons, single.  Her daughter Court, see's her often at the Nursing Home ([facility]).  Moved into Nursing Home in October 2011 after a hospitalization for ALY.  Moved from [place] in 2002 to [facility]. Has 5 grandchildren.     Family History   Problem Relation Age of Onset     Hypertension Mother      Cerebrovascular Disease Mother      Glaucoma Father      Cancer Father      Diabetes Sister      Glaucoma Sister      Cancer - colorectal Other      Cerebrovascular Disease Other      Skin Cancer No family hx of      Melanoma No family hx of      Anesthesia Reaction No family hx of      Deep Vein Thrombosis (DVT) No family hx of      Lab Results   Component Value Date    A1C 6.6 07/23/2019    A1C 7.5 10/24/2016    A1C 7.5 10/12/2015    A1C 8.1 03/06/2014    A1C 7.2 03/07/2013     A1c                      6.6              1/06/20  SUBJECTIVE FINDINGS:  69-year-old female returns to clinic for diabetic foot check.  She relates her left 1 and 2 toenails are sore.  Relates no injuries.  No specific relieving or aggravating factors.  She relates she is wearing her diabetic shoes.  She presents in a wheelchair.  Relates she does have numbness, tingling, neuropathy in her feet.  They are putting lotion on her feet daily.      OBJECTIVE FINDINGS:  DP and PT are 1/4 bilaterally.  She has decreased hair growth bilaterally.  She has some peripheral edema bilaterally.  She has decreased range of motion bilaterally.  Decreased sharp/dull with 5.07 [place]-Karthik monofilament bilaterally.  Deep  tendon reflexes are intact bilaterally.  She has no gross tendon voids bilaterally.  There is no erythema, no drainage, no odor, no calor bilaterally.  She has dystrophic, thickened nails with subungual debris and dystrophy and discoloration to differing degrees bilaterally.      ASSESSMENT/PLAN:  Onychomycosis bilaterally.  Her heel ulcer remains closed.  She is diabetic with peripheral neuropathy and vascular disease.  She has hammertoes present as well.  Diagnosis and treatment options discussed with her.  All the nails were reduced or debrided bilaterally upon consent.  Continue her diabetic shoes.  Return to clinic and see me in 2-3 months.         Again, thank you for allowing me to participate in the care of your patient.      Sincerely,    Vu Grant DPM

## 2020-02-24 NOTE — PROGRESS NOTES
Past Medical History:   Diagnosis Date     AION (anterior ischaemic optic neuropathy), left eye     NAION LE     CAD (coronary artery disease) 3/2009    [facility]; Angio  UM- normal coronary arteries     Cataract      CVA (cerebral vascular accident) (H)     admitted at [facility]     on Cymbalta     Diabetes mellitus, type 2 (H)      Diabetic nephropathy (H)      Diabetic neuropathy (H)     severe     Diabetic retinopathy (H)      GERD (gastroesophageal reflux disease)      Hyperlipidemia      Hypertension     ECHO 2013, TDS, NL EF     POAG (primary open-angle glaucoma)     adv BE     Seasonal allergies      Tubular adenoma of colon 2013    repeat colonoscopy in 2018     Patient Active Problem List   Diagnosis     Cataract     CAD (coronary artery disease)     Diabetes mellitus, type 2 (H)     Diabetic nephropathy (H)     Diabetic neuropathy (H)     Diabetic retinopathy (H)     GERD (gastroesophageal reflux disease)     Glaucoma     Hypertension     Hyperlipidemia     CVA (cerebral vascular accident) (H)     Morbid obesity (H)     Anemia     Seasonal allergies     Depression     Tubular adenoma of colon     Leg weakness     Dermatitis, seborrheic     Eczematous dermatitis     History of coronary artery disease     Venous stasis dermatitis of both lower extremities     Uncontrolled type 2 diabetes mellitus (H)     Chronic dermatitis of hands     Neoplasm of uncertain behavior of skin     S/P hysterectomy     Hypomagnesemia     Gout     Diabetic peripheral neuropathy associated with type 2 diabetes mellitus (H)     CKD (chronic kidney disease)     Acute chest pain     Past Surgical History:   Procedure Laterality Date      SECTION       COLONOSCOPY  7/15/2013    Tubular adenoma; repeat in ;Procedure: COMBINED COLONOSCOPY, SINGLE BIOPSY/POLYPECTOMY BY BIOPSY;;  Surgeon: Don King MD;  Tubular adenoma     COLONOSCOPY N/A 2020    Procedure: COLONOSCOPY;  Surgeon:  Sid Amanda MD;  Location:  GI     DAVINCI HYSTERECTOMY TOTAL, BILATERAL SALPINGO-OOPHORECTOMY, COMBINED N/A 2019    Procedure: DaVinci Assisted Total Laparoscopic Hysterectomy, Removal Of Both Tubes And Ovaries;  Surgeon: Linh Cardoso MD;  Location:  OR     EXTRACAPSULAR CATARACT EXTRATION WITH INTRAOCULAR LENS IMPLANT  11-10-09, 2-9-10    11-10-09 Lt, 2-9-10 Rt; Left eye 2012     STENT, CORONARY, DELORES  2009    RCA     Social History     Socioeconomic History     Marital status: Single     Spouse name: Not on file     Number of children: Not on file     Years of education: Not on file     Highest education level: Not on file   Occupational History     Not on file   Social Needs     Financial resource strain: Not on file     Food insecurity:     Worry: Not on file     Inability: Not on file     Transportation needs:     Medical: Not on file     Non-medical: Not on file   Tobacco Use     Smoking status: Former Smoker     Packs/day: 1.50     Years: 45.00     Pack years: 67.50     Types: Cigarettes     Start date: 1968     Last attempt to quit: 3/6/2013     Years since quittin.9     Smokeless tobacco: Never Used   Substance and Sexual Activity     Alcohol use: Not Currently     Drug use: Never     Sexual activity: Not Currently   Lifestyle     Physical activity:     Days per week: Not on file     Minutes per session: Not on file     Stress: Not on file   Relationships     Social connections:     Talks on phone: Not on file     Gets together: Not on file     Attends [employer] service: Not on file     Active member of club or organization: Not on file     Attends meetings of clubs or organizations: Not on file     Relationship status: Not on file     Intimate partner violence:     Fear of current or ex partner: Not on file     Emotionally abused: Not on file     Physically abused: Not on file     Forced sexual activity: Not on file   Other Topics Concern     Parent/sibling w/ CABG, MI  or angioplasty before 65F 55M? Not Asked   Social History Narrative    Pt has three daughters and two sons, single.  Her daughter Court, see's her often at the Nursing Home ([facility]).  Moved into Nursing Home in October 2011 after a hospitalization for ALY.  Moved from [place] in 2002 to [facility]. Has 5 grandchildren.     Family History   Problem Relation Age of Onset     Hypertension Mother      Cerebrovascular Disease Mother      Glaucoma Father      Cancer Father      Diabetes Sister      Glaucoma Sister      Cancer - colorectal Other      Cerebrovascular Disease Other      Skin Cancer No family hx of      Melanoma No family hx of      Anesthesia Reaction No family hx of      Deep Vein Thrombosis (DVT) No family hx of      Lab Results   Component Value Date    A1C 6.6 07/23/2019    A1C 7.5 10/24/2016    A1C 7.5 10/12/2015    A1C 8.1 03/06/2014    A1C 7.2 03/07/2013     A1c                      6.6              1/06/20  SUBJECTIVE FINDINGS:  69-year-old female returns to clinic for diabetic foot check.  She relates her left 1 and 2 toenails are sore.  Relates no injuries.  No specific relieving or aggravating factors.  She relates she is wearing her diabetic shoes.  She presents in a wheelchair.  Relates she does have numbness, tingling, neuropathy in her feet.  They are putting lotion on her feet daily.      OBJECTIVE FINDINGS:  DP and PT are 1/4 bilaterally.  She has decreased hair growth bilaterally.  She has some peripheral edema bilaterally.  She has decreased range of motion bilaterally.  Decreased sharp/dull with 5.07 [place]-Karthik monofilament bilaterally.  Deep tendon reflexes are intact bilaterally.  She has no gross tendon voids bilaterally.  There is no erythema, no drainage, no odor, no calor bilaterally.  She has dystrophic, thickened nails with subungual debris and dystrophy and discoloration to differing degrees bilaterally.      ASSESSMENT/PLAN:  Onychomycosis bilaterally.  Her  heel ulcer remains closed.  She is diabetic with peripheral neuropathy and vascular disease.  She has hammertoes present as well.  Diagnosis and treatment options discussed with her.  All the nails were reduced or debrided bilaterally upon consent.  Continue her diabetic shoes.  Return to clinic and see me in 2-3 months.

## 2020-02-24 NOTE — NURSING NOTE
Reason For Visit:   Chief Complaint   Patient presents with     Follow Up     3 month follow up. Pain, left great toe.        Pain Assessment  Patient Currently in Pain: Denies        Allergies   Allergen Reactions     Dust Mites Other (See Comments)     Sneezing runny eyes and nose.     Food Allergy Formula Hives     Mountain Dew and Walnuts     Pollen Extract Other (See Comments)     Sneezing runny eyes and nose.           Mena Harris LPN

## 2020-03-17 ENCOUNTER — RECORDS - HEALTHEAST (OUTPATIENT)
Dept: LAB | Facility: CLINIC | Age: 70
End: 2020-03-17

## 2020-03-18 LAB — HBA1C MFR BLD: 6.9 %

## 2020-03-19 ENCOUNTER — OFFICE VISIT - HEALTHEAST (OUTPATIENT)
Dept: GERIATRICS | Facility: CLINIC | Age: 70
End: 2020-03-19

## 2020-03-19 DIAGNOSIS — Z79.4 TYPE 2 DIABETES MELLITUS WITH HYPERGLYCEMIA, WITH LONG-TERM CURRENT USE OF INSULIN (H): ICD-10-CM

## 2020-03-19 DIAGNOSIS — I10 ESSENTIAL HYPERTENSION: ICD-10-CM

## 2020-03-19 DIAGNOSIS — E11.65 TYPE 2 DIABETES MELLITUS WITH HYPERGLYCEMIA, WITH LONG-TERM CURRENT USE OF INSULIN (H): ICD-10-CM

## 2020-03-19 DIAGNOSIS — R54 AGE-RELATED PHYSICAL DEBILITY: ICD-10-CM

## 2020-03-26 ENCOUNTER — OFFICE VISIT - HEALTHEAST (OUTPATIENT)
Dept: GERIATRICS | Facility: CLINIC | Age: 70
End: 2020-03-26

## 2020-03-26 DIAGNOSIS — G62.9 NEUROPATHY: ICD-10-CM

## 2020-03-26 DIAGNOSIS — I10 ESSENTIAL HYPERTENSION: ICD-10-CM

## 2020-03-26 DIAGNOSIS — Z86.73 HISTORY OF STROKE: ICD-10-CM

## 2020-04-01 ENCOUNTER — TELEPHONE (OUTPATIENT)
Dept: OPHTHALMOLOGY | Facility: CLINIC | Age: 70
End: 2020-04-01

## 2020-04-01 NOTE — TELEPHONE ENCOUNTER
Scheduled July 7th with Dr. Botello    Care facility aware of date/time/location at [facility]    Glen Willard RN 4:41 PM 04/01/20          [facility] Call Center    Phone Message    May a detailed message be left on voicemail: no     Reason for Call: Other: Rosalva from [facility] calling to schedule pt with Dr. Boetllo, former pt of Dr. Delong, this is just for a follow up and Rosalva stated pt is doing fine.  Please call to get this pt scheduled, you may call [phone] and ask for Rosalva     Action Taken: Message routed to:  [facility] ([facility] eye    Travel Screening: Not Applicable

## 2020-05-01 ENCOUNTER — COMMUNICATION - HEALTHEAST (OUTPATIENT)
Dept: GERIATRICS | Facility: CLINIC | Age: 70
End: 2020-05-01

## 2020-05-19 ENCOUNTER — TELEPHONE (OUTPATIENT)
Dept: ENDOCRINOLOGY | Facility: CLINIC | Age: 70
End: 2020-05-19

## 2020-05-19 NOTE — TELEPHONE ENCOUNTER
Please contact  [facility] to set up  a video visit . The nursing home should  Provide blood sugars and MAR  for the visit  by faxing to  [phone].Message left for Karen at  [facility] to call to set this up . Chelo Marcelo RN on 5/19/2020 at 2:21 PM

## 2020-05-19 NOTE — TELEPHONE ENCOUNTER
----- Message from Renetta Washington PA-C sent at 5/19/2020  2:09 PM CDT -----  Regarding: schedule follow up  Please contact patient/nursing home to set up video or phone follow up visit.  Nursing home should provide glucose data prior to appointment.  Thanks!  Renetta

## 2020-05-20 ENCOUNTER — TELEPHONE (OUTPATIENT)
Dept: ENDOCRINOLOGY | Facility: CLINIC | Age: 70
End: 2020-05-20

## 2020-05-20 NOTE — TELEPHONE ENCOUNTER
I was not able to reach staff to set up video visit with Renetta Washington. Chelo Marcelo, RN on 5/20/2020 at 4:31 PM

## 2020-06-04 NOTE — TELEPHONE ENCOUNTER
CLINIC COORDINATOR SCHEDULING NOTES    CALL RESULT: LVM    APPT TYPE: VIDEO VISIT RETURN / TELEPHONE VISIT RETURN if pt does not have video capabilities    PROVIDER: Felix    DATE APPT NEEDED: 1st available

## 2020-06-08 ENCOUNTER — OFFICE VISIT - HEALTHEAST (OUTPATIENT)
Dept: GERIATRICS | Facility: CLINIC | Age: 70
End: 2020-06-08

## 2020-06-08 DIAGNOSIS — E11.42 DIABETIC PERIPHERAL NEUROPATHY ASSOCIATED WITH TYPE 2 DIABETES MELLITUS (H): ICD-10-CM

## 2020-06-08 DIAGNOSIS — R54 AGE-RELATED PHYSICAL DEBILITY: ICD-10-CM

## 2020-06-08 DIAGNOSIS — F32.A DEPRESSION, UNSPECIFIED DEPRESSION TYPE: ICD-10-CM

## 2020-06-08 DIAGNOSIS — I10 ESSENTIAL HYPERTENSION: ICD-10-CM

## 2020-06-10 ENCOUNTER — MEDICAL CORRESPONDENCE (OUTPATIENT)
Dept: HEALTH INFORMATION MANAGEMENT | Facility: CLINIC | Age: 70
End: 2020-06-10

## 2020-07-10 ENCOUNTER — MEDICAL CORRESPONDENCE (OUTPATIENT)
Dept: HEALTH INFORMATION MANAGEMENT | Facility: CLINIC | Age: 70
End: 2020-07-10

## 2020-07-20 ENCOUNTER — COMMUNICATION - HEALTHEAST (OUTPATIENT)
Dept: GERIATRICS | Facility: CLINIC | Age: 70
End: 2020-07-20

## 2020-07-30 ENCOUNTER — OFFICE VISIT - HEALTHEAST (OUTPATIENT)
Dept: GERIATRICS | Facility: CLINIC | Age: 70
End: 2020-07-30

## 2020-07-30 DIAGNOSIS — Z86.73 HISTORY OF STROKE: ICD-10-CM

## 2020-07-30 DIAGNOSIS — Z79.4 TYPE 2 DIABETES MELLITUS WITH DIABETIC POLYNEUROPATHY, WITH LONG-TERM CURRENT USE OF INSULIN (H): ICD-10-CM

## 2020-07-30 DIAGNOSIS — I10 ESSENTIAL HYPERTENSION: ICD-10-CM

## 2020-07-30 DIAGNOSIS — E11.42 TYPE 2 DIABETES MELLITUS WITH DIABETIC POLYNEUROPATHY, WITH LONG-TERM CURRENT USE OF INSULIN (H): ICD-10-CM

## 2020-07-30 DIAGNOSIS — G62.9 NEUROPATHY: ICD-10-CM

## 2020-08-27 ENCOUNTER — COMMUNICATION - HEALTHEAST (OUTPATIENT)
Dept: GERIATRICS | Facility: CLINIC | Age: 70
End: 2020-08-27

## 2020-08-31 ENCOUNTER — RECORDS - HEALTHEAST (OUTPATIENT)
Dept: LAB | Facility: CLINIC | Age: 70
End: 2020-08-31

## 2020-08-31 LAB — VIT B12 SERPL-MCNC: 260 PG/ML (ref 213–816)

## 2020-09-01 ENCOUNTER — MEDICAL CORRESPONDENCE (OUTPATIENT)
Dept: HEALTH INFORMATION MANAGEMENT | Facility: CLINIC | Age: 70
End: 2020-09-01

## 2020-09-16 ENCOUNTER — OFFICE VISIT - HEALTHEAST (OUTPATIENT)
Dept: GERIATRICS | Facility: CLINIC | Age: 70
End: 2020-09-16

## 2020-09-16 DIAGNOSIS — Z79.4 TYPE 2 DIABETES MELLITUS WITH HYPERGLYCEMIA, WITH LONG-TERM CURRENT USE OF INSULIN (H): ICD-10-CM

## 2020-09-16 DIAGNOSIS — I10 ESSENTIAL HYPERTENSION: ICD-10-CM

## 2020-09-16 DIAGNOSIS — E11.65 TYPE 2 DIABETES MELLITUS WITH HYPERGLYCEMIA, WITH LONG-TERM CURRENT USE OF INSULIN (H): ICD-10-CM

## 2020-09-16 DIAGNOSIS — R53.81 PHYSICAL DECONDITIONING: ICD-10-CM

## 2020-10-03 ENCOUNTER — RECORDS - HEALTHEAST (OUTPATIENT)
Dept: LAB | Facility: CLINIC | Age: 70
End: 2020-10-03

## 2020-10-05 ENCOUNTER — COMMUNICATION - HEALTHEAST (OUTPATIENT)
Dept: GERIATRICS | Facility: CLINIC | Age: 70
End: 2020-10-05

## 2020-10-05 LAB — FERRITIN SERPL-MCNC: 399 NG/ML (ref 10–130)

## 2020-10-10 ENCOUNTER — MEDICAL CORRESPONDENCE (OUTPATIENT)
Dept: HEALTH INFORMATION MANAGEMENT | Facility: CLINIC | Age: 70
End: 2020-10-10

## 2020-10-13 ENCOUNTER — COMMUNICATION - HEALTHEAST (OUTPATIENT)
Dept: GERIATRICS | Facility: CLINIC | Age: 70
End: 2020-10-13

## 2020-10-13 ENCOUNTER — AMBULATORY - HEALTHEAST (OUTPATIENT)
Dept: GERIATRICS | Facility: CLINIC | Age: 70
End: 2020-10-13

## 2020-10-15 ENCOUNTER — COMMUNICATION - HEALTHEAST (OUTPATIENT)
Dept: CARE COORDINATION | Facility: HOSPITAL | Age: 70
End: 2020-10-15

## 2020-10-28 ENCOUNTER — OFFICE VISIT - HEALTHEAST (OUTPATIENT)
Dept: GERIATRICS | Facility: CLINIC | Age: 70
End: 2020-10-28

## 2020-10-28 DIAGNOSIS — R79.89 ELEVATED FERRITIN: ICD-10-CM

## 2020-10-28 DIAGNOSIS — R05.9 COUGH: ICD-10-CM

## 2020-11-01 ENCOUNTER — RECORDS - HEALTHEAST (OUTPATIENT)
Dept: LAB | Facility: CLINIC | Age: 70
End: 2020-11-01

## 2020-11-02 ENCOUNTER — COMMUNICATION - HEALTHEAST (OUTPATIENT)
Dept: GERIATRICS | Facility: CLINIC | Age: 70
End: 2020-11-02

## 2020-11-02 LAB
BASOPHILS # BLD AUTO: 0 THOU/UL (ref 0–0.2)
BASOPHILS NFR BLD AUTO: 1 % (ref 0–2)
EOSINOPHIL # BLD AUTO: 0.3 THOU/UL (ref 0–0.4)
EOSINOPHIL NFR BLD AUTO: 5 % (ref 0–6)
ERYTHROCYTE [DISTWIDTH] IN BLOOD BY AUTOMATED COUNT: 13.5 % (ref 11–14.5)
HCT VFR BLD AUTO: 32.9 % (ref 35–47)
HGB BLD-MCNC: 10.8 G/DL (ref 12–16)
IMM GRANULOCYTES # BLD: 0 THOU/UL
IMM GRANULOCYTES NFR BLD: 0 %
IRON SATN MFR SERPL: 27 % (ref 20–50)
IRON SERPL-MCNC: 60 UG/DL (ref 42–175)
LYMPHOCYTES # BLD AUTO: 1.6 THOU/UL (ref 0.8–4.4)
LYMPHOCYTES NFR BLD AUTO: 34 % (ref 20–40)
MCH RBC QN AUTO: 31.5 PG (ref 27–34)
MCHC RBC AUTO-ENTMCNC: 32.8 G/DL (ref 32–36)
MCV RBC AUTO: 96 FL (ref 80–100)
MONOCYTES # BLD AUTO: 0.8 THOU/UL (ref 0–0.9)
MONOCYTES NFR BLD AUTO: 16 % (ref 2–10)
NEUTROPHILS # BLD AUTO: 2.1 THOU/UL (ref 2–7.7)
NEUTROPHILS NFR BLD AUTO: 44 % (ref 50–70)
PLATELET # BLD AUTO: 245 THOU/UL (ref 140–440)
PMV BLD AUTO: 11.2 FL (ref 8.5–12.5)
RBC # BLD AUTO: 3.43 MILL/UL (ref 3.8–5.4)
TIBC SERPL-MCNC: 223 UG/DL (ref 313–563)
TRANSFERRIN SERPL-MCNC: 178 MG/DL (ref 212–360)
WBC: 4.9 THOU/UL (ref 4–11)

## 2020-11-03 ENCOUNTER — TELEPHONE (OUTPATIENT)
Dept: ENDOCRINOLOGY | Facility: CLINIC | Age: 70
End: 2020-11-03

## 2020-11-03 ENCOUNTER — COMMUNICATION - HEALTHEAST (OUTPATIENT)
Dept: GERIATRICS | Facility: CLINIC | Age: 70
End: 2020-11-03

## 2020-11-03 ENCOUNTER — MEDICAL CORRESPONDENCE (OUTPATIENT)
Dept: HEALTH INFORMATION MANAGEMENT | Facility: CLINIC | Age: 70
End: 2020-11-03

## 2020-11-03 NOTE — TELEPHONE ENCOUNTER
Action Needed --  I've placed a hold that needs scheduling. Please see below.  Department: DIabetes  RTN  Provider: Renetta Washington   Date/Time: 11/16/20 10 AM  In clinic   RFV: Diabetes    Chelo Marcelo RN on 11/3/2020 at 3:18 PM

## 2020-11-11 ENCOUNTER — TELEPHONE (OUTPATIENT)
Dept: ENDOCRINOLOGY | Facility: CLINIC | Age: 70
End: 2020-11-11

## 2020-11-11 NOTE — TELEPHONE ENCOUNTER
Reviewed faxed glucose logs from [facility].  Glucose is still a bit low in the morning on occasion, in the 80's, but most numbers in the mid-100s.      Advised reducing insulin to 72 units (down from 80 units).     Asked [facility] to send glucose values next week and to schedule a phone visit with me (had to cancel in person due to COVID in her nursing home).      KRISSY NoriegaC

## 2020-11-18 ENCOUNTER — MEDICAL CORRESPONDENCE (OUTPATIENT)
Dept: HEALTH INFORMATION MANAGEMENT | Facility: CLINIC | Age: 70
End: 2020-11-18

## 2020-11-18 ENCOUNTER — TRANSFERRED RECORDS (OUTPATIENT)
Dept: HEALTH INFORMATION MANAGEMENT | Facility: CLINIC | Age: 70
End: 2020-11-18

## 2020-11-19 ENCOUNTER — MEDICAL CORRESPONDENCE (OUTPATIENT)
Dept: HEALTH INFORMATION MANAGEMENT | Facility: CLINIC | Age: 70
End: 2020-11-19

## 2020-11-21 ENCOUNTER — RECORDS - HEALTHEAST (OUTPATIENT)
Dept: LAB | Facility: CLINIC | Age: 70
End: 2020-11-21

## 2020-11-23 ENCOUNTER — RECORDS - HEALTHEAST (OUTPATIENT)
Dept: LAB | Facility: CLINIC | Age: 70
End: 2020-11-23

## 2020-11-23 ENCOUNTER — VIRTUAL VISIT (OUTPATIENT)
Dept: ENDOCRINOLOGY | Facility: CLINIC | Age: 70
End: 2020-11-23
Payer: COMMERCIAL

## 2020-11-23 DIAGNOSIS — E11.9 WELL CONTROLLED TYPE 2 DIABETES MELLITUS (H): Primary | ICD-10-CM

## 2020-11-23 LAB
ANION GAP SERPL CALCULATED.3IONS-SCNC: 11 MMOL/L (ref 5–18)
BUN SERPL-MCNC: 17 MG/DL (ref 8–22)
CALCIUM SERPL-MCNC: 10 MG/DL (ref 8.5–10.5)
CHLORIDE BLD-SCNC: 103 MMOL/L (ref 98–107)
CO2 SERPL-SCNC: 23 MMOL/L (ref 22–31)
CREAT SERPL-MCNC: 0.91 MG/DL (ref 0.6–1.1)
CREAT UR-MCNC: 92.3 MG/DL
GFR SERPL CREATININE-BSD FRML MDRD: >60 ML/MIN/1.73M2
GLUCOSE BLD-MCNC: 171 MG/DL (ref 70–125)
MICROALBUMIN UR-MCNC: 4.99 MG/DL (ref 0–1.99)
MICROALBUMIN/CREAT UR: 54.1 MG/G
POTASSIUM BLD-SCNC: 4.1 MMOL/L (ref 3.5–5)
SODIUM SERPL-SCNC: 137 MMOL/L (ref 136–145)

## 2020-11-23 PROCEDURE — 99214 OFFICE O/P EST MOD 30 MIN: CPT | Mod: 95 | Performed by: PHYSICIAN ASSISTANT

## 2020-11-23 NOTE — PROGRESS NOTES
Outcome for 11/23/20 9:28 AM : Glucose data was scanned in the cart.  This visit was converted from an in person visit to a virtual visit due to the ongoing COVID-19 pandemic.    Start time: 1205  End time: 1226    HPI   Ms. Perciado is on the phone for a follow up virtual visit for long standing type 2 diabetes.  Most of the interview is conducted through her nurse at [facility], [facility].  Rosalva sends glucose values every 2 weeks and we have been slowly adjusting her insulin due to reduced appetite and weight loss.  She is currently taking Toujeo 72 units daily (reduced from 90 units).  Prior to the reduction, she was having low glucose readings into the 70's and was having to eat even when she did not want to.     She is currently being managed on Toujeo 72 units every morning (switched from U-500 in 2019) and Metformin 1000 mg daily (added in 2018), Victoza 1.8 mg daily (several years).  She likes this much better than her previous regimen.  Her insulin requirements have slowly decreased since the addition of Metformin.      Her caretakers at [facility] have been testing her blood sugars 4 times a day. AM readings are .  Lunch readings are 111-257 mg/dL,  Dinner- 113-169, HS- 140-228 mg/dL.      She is taking gabapentin for neuropathy.  She is also on Cymbalta.  She has no open sores.  She sees a nurse practitioner in her care facility regularly.  She does have pressure sores on her bottom and is seeing a wound nurse regularly.  She has not been able to see her family through the pandemic.  This has been hard on her.  She otherwise has been feeling well and in her usual state of health. She has no other concerns today.     PMH   Type 2 diabetes  Neuropathy   Nephropathy  Stroke 2010- mainly in wheelchair. Would like to start to walk again.    CAD, s/p stent 2009  HTN  Dyslipidemia  Cataracts  Glaucoma  GERD    Family Hx:   Mom- stroke  Dad- cancer  1 of 12 children  2 brothers and 2  "sisters-  cancer- unsure of type  1 sister with diabetes, on insulin  5 children  Son- MS, milder  Daughter, April- MS  Other kids- healthy  6 grandchildren    Social Hx:   Ms. Preciado lives at [facility].  She keeps herself busy with many activities in the day, exercises (\"light and lively\"), crafts, coloring, baking, light bowling. She has many friends in their 90's there and she enjoys their company. She is seeing her family about 1-2 times a month now.     Has 5 children, all now living in [facility].  6 grandchildren.  Originally from [facility].     Current Medications  Current Outpatient Medications   Medication Sig Dispense Refill     acetaminophen (TYLENOL) 500 MG tablet Take 1,000 mg by mouth 3 times daily Tylenol Extra Strength       ASPIRIN LOW DOSE 81 MG chewable tablet CHEW AND SWALLOW 1 TAB BY MOUTH ONCE DAILY IN THE MORNING  99     atorvastatin (LIPITOR) 80 MG tablet Take 1 tablet by mouth daily. Pt needs to have labs done prior to further refills. (Patient taking differently: Take 80 mg by mouth At Bedtime Pt needs to have labs done prior to further refills.) 90 tablet 0     carboxymethylcellulose (REFRESH PLUS) 0.5 % SOLN 1 drop 3 times daily as needed.       CHLORTHALIDONE PO Take 25 mg by mouth every morning        dorzolamide-timolol 2-0.5 % OP ophthalmic solution Place 1 drop into both eyes 2 times daily 1 Bottle 11     DULoxetine HCl (CYMBALTA PO) Take 60 mg by mouth every morning        Elastic Bandages & Supports (JOBST KNEE HIGH COMPRESSION SM) MISC JOBST 20-30MMHG COMPRESSION SM MISC   To both legs during waking hours daily for leg swelling and venous stasis dermatitis. Do not sleep in stockings. 2 each 3     ferrous sulfate (IRON) 325 (65 FE) MG tablet Take 325 mg by mouth daily (with lunch)        folic acid (FOLVITE) 1 MG tablet Take 1 mg by mouth every morning        gabapentin (NEURONTIN) 600 MG tablet Take 600 mg in AM , 600 mg afternoon and 900 mg at HS. " (Patient taking differently: Take 300 mg by mouth 3 times daily ) 90 tablet 1     Insulin Glargine, 2 Unit Dial, 300 UNIT/ML SOPN Inject 72 Units Subcutaneous daily       insulin pen needle (B-D U/F) 31G X 5 MM Use 5 time(s) per day.  Please dispense as BD Pen Needle Mini U/F 31G x 5  each 11     ketoconazole (NIZORAL) 2 % shampoo To entire wet scalp and ears and then wash off after 5 minutes three times a week. (Patient taking differently: Apply topically twice a week To entire wet scalp and ears and then wash off after 5 minutes two times a week.) 240 mL 11     latanoprost (XALATAN) 0.005 % ophthalmic solution 1 drop At Bedtime. Left eye       liraglutide (VICTOZA) 18 MG/3ML SOLN Inject 1.8 mg Subcutaneous daily. Start with 0.6 mg x 5 days, then increase to 1.2 mg x 5 days, then 1.8 mg thereafter.  Do not advance to higher dose if you have nausea. (Patient taking differently: Inject 1.8 mg Subcutaneous every morning ) 3 Month 3     lisinopril (PRINIVIL,ZESTRIL) 5 MG tablet Take 2 tablets by mouth daily. Take one tab daily/Hold for SBP < 110 (Patient not taking: Reported on 2/24/2020) 90 tablet 3     loratadine (CLARITIN) 10 MG tablet Take 10 mg by mouth as needed.       metFORMIN (GLUCOPHAGE-XR) 500 MG 24 hr tablet Take 2 tablets (1,000 mg) by mouth daily (with dinner) (Patient taking differently: Take 1,000 mg by mouth every morning ) 180 tablet 3     metoprolol (LOPRESSOR) 10 mg/mL SUSP Take 100 mg by mouth 2 times daily       nitroFURantoin macrocrystal-monohydrate (MACROBID) 100 MG capsule Take 1 capsule (100 mg) by mouth every 12 hours For total of 5 days (Patient not taking: Reported on 2/24/2020)       nitroFURantoin macrocrystal-monohydrate (MACROBID) 100 MG capsule Take 1 capsule (100 mg) by mouth 2 times daily (Patient not taking: Reported on 2/24/2020) 10 capsule 0     nitroGLYCERIN (NITROSTAT) 0.4 MG SL tablet Place 0.4 mg under the tongue every 5 minutes as needed.       olopatadine HCl  (PATADAY) 0.2 % SOLN Place 1 drop into both eyes daily as needed For itchy eyes (Patient taking differently: Place 1 drop into both eyes as needed For itchy eyes) 1 Bottle 5     omeprazole (PRILOSEC) 20 MG DR capsule Take 20 mg by mouth       oxyCODONE (ROXICODONE) 5 MG tablet Take 1 tablet (5 mg) by mouth every 6 hours as needed for severe pain (Patient not taking: Reported on 2020) 10 tablet 0     Pregabalin (LYRICA PO) Take 75 mg by mouth 3 times daily        ranitidine (ZANTAC) 150 MG tablet Take 150 mg by mouth 2 times daily       senna-docusate (SENOKOT-S/PERICOLACE) 8.6-50 MG tablet Take 2 tablets by mouth 2 times daily as needed for constipation 60 tablet 0     traMADol (ULTRAM) 50 MG tablet Take 1 tablet (50 mg) by mouth as needed (HOLD IF STILL TAKING OXYCODONE FOR POST OP PAIN)       triamcinolone (KENALOG) 0.1 % ointment Apply topically daily To legs under compression stockings for stasis dermatitis discoloration. 60 g 5     Past Medical History:   Diagnosis Date     AION (anterior ischaemic optic neuropathy), left eye     NAION LE     CAD (coronary artery disease) 3/2009    [facility]; Angio  [facility]- normal coronary arteries     Cataract      CVA (cerebral vascular accident) (H)     admitted at [facility]     on Cymbalta     Diabetes mellitus, type 2 (H)      Diabetic nephropathy (H)      Diabetic neuropathy (H)     severe     Diabetic retinopathy (H)      GERD (gastroesophageal reflux disease)      Hyperlipidemia      Hypertension     ECHO 2013, TDS, NL EF     POAG (primary open-angle glaucoma)     adv BE     Seasonal allergies      Tubular adenoma of colon 2013    repeat colonoscopy in 2018       Past Surgical History:   Procedure Laterality Date      SECTION       COLONOSCOPY  7/15/2013    Tubular adenoma; repeat in 2018;Procedure: COMBINED COLONOSCOPY, SINGLE BIOPSY/POLYPECTOMY BY BIOPSY;;  Surgeon: Don King MD;  Tubular adenoma     COLONOSCOPY N/A  2020    Procedure: COLONOSCOPY;  Surgeon: Sid Amanda MD;  Location:  GI     DAVINCI HYSTERECTOMY TOTAL, BILATERAL SALPINGO-OOPHORECTOMY, COMBINED N/A 2019    Procedure: DaVinci Assisted Total Laparoscopic Hysterectomy, Removal Of Both Tubes And Ovaries;  Surgeon: Linh Cardoso MD;  Location:  OR     EXTRACAPSULAR CATARACT EXTRATION WITH INTRAOCULAR LENS IMPLANT  11-10-09, 2-9-10    11-10-09 Lt, 2-9-10 Rt; Left eye 2012     STENT, CORONARY, DELORES  2009    RCA       Family History   Problem Relation Age of Onset     Hypertension Mother      Cerebrovascular Disease Mother      Glaucoma Father      Cancer Father      Diabetes Sister      Glaucoma Sister      Cancer - colorectal Other      Cerebrovascular Disease Other      Skin Cancer No family hx of      Melanoma No family hx of      Anesthesia Reaction No family hx of      Deep Vein Thrombosis (DVT) No family hx of        Social History     Socioeconomic History     Marital status: Single     Spouse name: Not on file     Number of children: Not on file     Years of education: Not on file     Highest education level: Not on file   Occupational History     Not on file   Social Needs     Financial resource strain: Not on file     Food insecurity:     Worry: Not on file     Inability: Not on file     Transportation needs:     Medical: Not on file     Non-medical: Not on file   Tobacco Use     Smoking status: Former Smoker     Packs/day: 1.50     Years: 45.00     Pack years: 67.50     Types: Cigarettes     Start date: 1968     Last attempt to quit: 3/6/2013     Years since quittin.4     Smokeless tobacco: Never Used   Substance and Sexual Activity     Alcohol use: Not Currently     Drug use: Never     Sexual activity: Not Currently   Lifestyle     Physical activity:     Days per week: Not on file     Minutes per session: Not on file     Stress: Not on file   Relationships     Social connections:     Talks on phone: Not on file      Gets together: Not on file     Attends Nondenominational service: Not on file     Active member of club or organization: Not on file     Attends meetings of clubs or organizations: Not on file     Relationship status: Not on file     Intimate partner violence:     Fear of current or ex partner: Not on file     Emotionally abused: Not on file     Physically abused: Not on file     Forced sexual activity: Not on file   Other Topics Concern     Parent/sibling w/ CABG, MI or angioplasty before 65F 55M? Not Asked   Social History Narrative    Pt has three daughters and two sons, single.  Her daughter Court, see's her often at the Nursing Home ([facility]  Moved into Nursing Home in October 2011 after a hospitalization for ALY.  Moved from [place] in 2002 to [place]. Has 5 grandchildren.       Physical Exam   There were no vitals taken for this visit.   GENERAL: healthy, alert and no distress  RESP: no audible wheeze, cough.  No visible retractions or increased work of breathing.  Able to speak fully in complete sentences.  PSYCH: mentation appears normal, affect normal/bright, judgement and insight intact, normal speech and appearance well-groomed      RESULTS  Lab Results   Component Value Date    A1C 6.6 (H) 07/23/2019    A1C 7.5 (H) 10/24/2016    A1C 7.5 (H) 10/12/2015    A1C 8.1 (H) 03/06/2014    A1C 7.2 (H) 03/07/2013    HEMOGLOBINA1 6.6 (A) 01/06/2020    HEMOGLOBINA1 6.5 (A) 05/24/2019    HEMOGLOBINA1 7.1 (A) 10/10/2018    HEMOGLOBINA1 7.1 (A) 03/16/2018    HEMOGLOBINA1 7.3 (A) 06/05/2017    HEMOGLOBINA1 7.3 (A) 06/05/2017       TSH   Date Value Ref Range Status   10/24/2016 1.20 0.40 - 4.00 mU/L Final   10/12/2015 1.18 0.40 - 4.00 mU/L Final   08/21/2014 1.24 0.40 - 4.00 mU/L Final     Comment:     Effective 7/30/2014, the reference range for this assay has changed to reflect   new instrumentation/methodology.     08/05/2013 1.12 0.4 - 5.0 mU/L Final   07/15/2010 0.53 0.4 - 5.0 mU/L Final     T4 Total   Date  Value Ref Range Status   10/30/2008 10.6 5.0 - 11.0 ug/dL Final     T4 Free   Date Value Ref Range Status   04/21/2006 1.04 0.70 - 1.85 ng/dL Final   09/23/2005 1.58 0.70 - 1.85 ng/dL Final       ALT   Date Value Ref Range Status   10/12/2015 39 0 - 50 U/L Final   03/06/2014 36 0 - 50 U/L Final   ]    Recent Labs   Lab Test 03/19/18 10/24/16  1301 10/12/15  1626 08/21/14  0935   CHOL 109  --   --  110   HDL  --   --   --  43*   LDL  --  48 51 43   TRIG  --   --   --  122   CHOLHDLRATIO  --   --   --  2.6       Lab Results   Component Value Date     08/06/2019      Lab Results   Component Value Date    POTASSIUM 4.8 08/06/2019     Lab Results   Component Value Date    CHLORIDE 104 08/06/2019     Lab Results   Component Value Date    OLAF 8.8 08/06/2019     Lab Results   Component Value Date    CO2 19 08/06/2019     Lab Results   Component Value Date    BUN 24 08/06/2019     Lab Results   Component Value Date    CR 0.98 08/06/2019       GFR Estimate   Date Value Ref Range Status   08/06/2019 59 (L) >60 mL/min/[1.73_m2] Final     Comment:     Non  GFR Calc  Starting 12/18/2018, serum creatinine based estimated GFR (eGFR) will be   calculated using the Chronic Kidney Disease Epidemiology Collaboration   (CKD-EPI) equation.     08/05/2019 50 (L) >60 mL/min/[1.73_m2] Final     Comment:     Non  GFR Calc  Starting 12/18/2018, serum creatinine based estimated GFR (eGFR) will be   calculated using the Chronic Kidney Disease Epidemiology Collaboration   (CKD-EPI) equation.     07/23/2019 48 (L) >60 mL/min/[1.73_m2] Final     Comment:     Non  GFR Calc  Starting 12/18/2018, serum creatinine based estimated GFR (eGFR) will be   calculated using the Chronic Kidney Disease Epidemiology Collaboration   (CKD-EPI) equation.       GFR Estimate If Black   Date Value Ref Range Status   08/06/2019 69 >60 mL/min/[1.73_m2] Final     Comment:      GFR Calc  Starting  12/18/2018, serum creatinine based estimated GFR (eGFR) will be   calculated using the Chronic Kidney Disease Epidemiology Collaboration   (CKD-EPI) equation.     08/05/2019 58 (L) >60 mL/min/[1.73_m2] Final     Comment:      GFR Calc  Starting 12/18/2018, serum creatinine based estimated GFR (eGFR) will be   calculated using the Chronic Kidney Disease Epidemiology Collaboration   (CKD-EPI) equation.     07/23/2019 56 (L) >60 mL/min/[1.73_m2] Final     Comment:      GFR Calc  Starting 12/18/2018, serum creatinine based estimated GFR (eGFR) will be   calculated using the Chronic Kidney Disease Epidemiology Collaboration   (CKD-EPI) equation.         Lab Results   Component Value Date    MICROL <5 10/12/2015     No results found for: MICROALBUMIN  No results found for: CPEPT, GADAB, ISCAB    Vitamin B12   Date Value Ref Range Status   08/05/2013 506 >210 pg/mL Final     Comment:     Interp: 247-911 = Normal   ]    Most recent eye exam date: : Not Found     Care Everywhere Results:   A1c: 6.9% 3/18/20  TSH: 1.04 1/7/20  GFR 53  9/27/20  Creatinine: 1.22 9/27/20  LDL: 45 8/27/19  BMP: pending, drawn today        Assessment/Plan:      1.  Type 2 diabetes-  Ms. Preciado's insulin requirements have come down significantly.  Her appetite is less and sh has been losing weight.  Last week, we reduced her Toujeo to 72 units daily and this seems to be working well.  No further changes today.  Will check BMP today.  May need to reduce metformin if GFR has dropped less than 45. For now, she will remain on her current therapy.     2.  Risk factors- BP is well controlled.  Will check for microalbuminuria, fasting lipids, vitamin B12, ALT.  [facility] will fax lab results.  No foot sores currently.  She is on gabapentin and cymbalta for neuropathy.  TSH normal in January, 2020. LDL 45    3. F/U in 3 months with Dr. Herrera, in 6 months with me.       Renetta Washington PA-C, MPAS    [facility]  Department of Medicine  Division of Endocrinology and Diabetes

## 2020-11-23 NOTE — LETTER
11/23/2020       RE: Cristal Preciado  [facility]  [address]  [place] [address]     Dear Colleague,    Thank you for referring your patient, Cristal Preciado, to the [facility] [place] at [facility]. Please see a copy of my visit note below.    Outcome for 11/23/20 9:28 AM : Glucose data was scanned in the cart.  This visit was converted from an in person visit to a virtual visit due to the ongoing COVID-19 pandemic.    Start time: 1205  End time: 1226    HPI   Ms. Preciado is on the phone for a follow up virtual visit for long standing type 2 diabetes.  Most of the interview is conducted through her nurse at [facility], Rosalva.  Rosalva sends glucose values every 2 weeks and we have been slowly adjusting her insulin due to reduced appetite and weight loss.  She is currently taking Toujeo 72 units daily (reduced from 90 units).  Prior to the reduction, she was having low glucose readings into the 70's and was having to eat even when she did not want to.     She is currently being managed on Toujeo 72 units every morning (switched from U-500 in 2019) and Metformin 1000 mg daily (added in 2018), Victoza 1.8 mg daily (several years).  She likes this much better than her previous regimen.  Her insulin requirements have slowly decreased since the addition of Metformin.      Her caretakers at [facility] have been testing her blood sugars 4 times a day. AM readings are .  Lunch readings are 111-257 mg/dL,  Dinner- 113-169, HS- 140-228 mg/dL.      She is taking gabapentin for neuropathy.  She is also on Cymbalta.  She has no open sores.  She sees a nurse practitioner in her care facility regularly.  She does have pressure sores on her bottom and is seeing a wound nurse regularly.  She has not been able to see her family through the pandemic.  This has been hard on her.  She otherwise has been  "feeling well and in her usual state of health. She has no other concerns today.     PMH   Type 2 diabetes  Neuropathy   Nephropathy  Stroke - mainly in wheelchair. Would like to start to walk again.    CAD, s/p stent   HTN  Dyslipidemia  Cataracts  Glaucoma  GERD    Family Hx:   Mom- stroke  Dad- cancer  1 of 12 children  2 brothers and 2 sisters-  cancer- unsure of type  1 sister with diabetes, on insulin  5 children  Son- MS, milder  Daughter, April- MS  Other kids- healthy  6 grandchildren    Social Hx:   Ms. Preciado lives at [facility].  She keeps herself busy with many activities in the day, exercises (\"light and lively\"), crafts, coloring, baking, light bowling. She has many friends in their 90's there and she enjoys their company. She is seeing her family about 1-2 times a month now.     Has 5 children, all now living in [facility].  6 grandchildren.  Originally from [facility].     Current Medications  Current Outpatient Medications   Medication Sig Dispense Refill     acetaminophen (TYLENOL) 500 MG tablet Take 1,000 mg by mouth 3 times daily Tylenol Extra Strength       ASPIRIN LOW DOSE 81 MG chewable tablet CHEW AND SWALLOW 1 TAB BY MOUTH ONCE DAILY IN THE MORNING  99     atorvastatin (LIPITOR) 80 MG tablet Take 1 tablet by mouth daily. Pt needs to have labs done prior to further refills. (Patient taking differently: Take 80 mg by mouth At Bedtime Pt needs to have labs done prior to further refills.) 90 tablet 0     carboxymethylcellulose (REFRESH PLUS) 0.5 % SOLN 1 drop 3 times daily as needed.       CHLORTHALIDONE PO Take 25 mg by mouth every morning        dorzolamide-timolol 2-0.5 % OP ophthalmic solution Place 1 drop into both eyes 2 times daily 1 Bottle 11     DULoxetine HCl (CYMBALTA PO) Take 60 mg by mouth every morning        Elastic Bandages & Supports (JOBST KNEE HIGH COMPRESSION SM) MISC JOBST 20-30MMHG COMPRESSION SM MISC   To both legs during waking hours daily " for leg swelling and venous stasis dermatitis. Do not sleep in stockings. 2 each 3     ferrous sulfate (IRON) 325 (65 FE) MG tablet Take 325 mg by mouth daily (with lunch)        folic acid (FOLVITE) 1 MG tablet Take 1 mg by mouth every morning        gabapentin (NEURONTIN) 600 MG tablet Take 600 mg in AM , 600 mg afternoon and 900 mg at HS. (Patient taking differently: Take 300 mg by mouth 3 times daily ) 90 tablet 1     Insulin Glargine, 2 Unit Dial, 300 UNIT/ML SOPN Inject 72 Units Subcutaneous daily       insulin pen needle (B-D U/F) 31G X 5 MM Use 5 time(s) per day.  Please dispense as BD Pen Needle Mini U/F 31G x 5  each 11     ketoconazole (NIZORAL) 2 % shampoo To entire wet scalp and ears and then wash off after 5 minutes three times a week. (Patient taking differently: Apply topically twice a week To entire wet scalp and ears and then wash off after 5 minutes two times a week.) 240 mL 11     latanoprost (XALATAN) 0.005 % ophthalmic solution 1 drop At Bedtime. Left eye       liraglutide (VICTOZA) 18 MG/3ML SOLN Inject 1.8 mg Subcutaneous daily. Start with 0.6 mg x 5 days, then increase to 1.2 mg x 5 days, then 1.8 mg thereafter.  Do not advance to higher dose if you have nausea. (Patient taking differently: Inject 1.8 mg Subcutaneous every morning ) 3 Month 3     lisinopril (PRINIVIL,ZESTRIL) 5 MG tablet Take 2 tablets by mouth daily. Take one tab daily/Hold for SBP < 110 (Patient not taking: Reported on 2/24/2020) 90 tablet 3     loratadine (CLARITIN) 10 MG tablet Take 10 mg by mouth as needed.       metFORMIN (GLUCOPHAGE-XR) 500 MG 24 hr tablet Take 2 tablets (1,000 mg) by mouth daily (with dinner) (Patient taking differently: Take 1,000 mg by mouth every morning ) 180 tablet 3     metoprolol (LOPRESSOR) 10 mg/mL SUSP Take 100 mg by mouth 2 times daily       nitroFURantoin macrocrystal-monohydrate (MACROBID) 100 MG capsule Take 1 capsule (100 mg) by mouth every 12 hours For total of 5 days (Patient  not taking: Reported on 2/24/2020)       nitroFURantoin macrocrystal-monohydrate (MACROBID) 100 MG capsule Take 1 capsule (100 mg) by mouth 2 times daily (Patient not taking: Reported on 2/24/2020) 10 capsule 0     nitroGLYCERIN (NITROSTAT) 0.4 MG SL tablet Place 0.4 mg under the tongue every 5 minutes as needed.       olopatadine HCl (PATADAY) 0.2 % SOLN Place 1 drop into both eyes daily as needed For itchy eyes (Patient taking differently: Place 1 drop into both eyes as needed For itchy eyes) 1 Bottle 5     omeprazole (PRILOSEC) 20 MG DR capsule Take 20 mg by mouth       oxyCODONE (ROXICODONE) 5 MG tablet Take 1 tablet (5 mg) by mouth every 6 hours as needed for severe pain (Patient not taking: Reported on 2/24/2020) 10 tablet 0     Pregabalin (LYRICA PO) Take 75 mg by mouth 3 times daily        ranitidine (ZANTAC) 150 MG tablet Take 150 mg by mouth 2 times daily       senna-docusate (SENOKOT-S/PERICOLACE) 8.6-50 MG tablet Take 2 tablets by mouth 2 times daily as needed for constipation 60 tablet 0     traMADol (ULTRAM) 50 MG tablet Take 1 tablet (50 mg) by mouth as needed (HOLD IF STILL TAKING OXYCODONE FOR POST OP PAIN)       triamcinolone (KENALOG) 0.1 % ointment Apply topically daily To legs under compression stockings for stasis dermatitis discoloration. 60 g 5     Past Medical History:   Diagnosis Date     AION (anterior ischaemic optic neuropathy), left eye     NAION LE     CAD (coronary artery disease) 3/2009    [facility]; Angio 2013 [facility]- normal coronary arteries     Cataract      CVA (cerebral vascular accident) (H)     admitted at [facility]     Depression     on Cymbalta     Diabetes mellitus, type 2 (H)      Diabetic nephropathy (H)      Diabetic neuropathy (H)     severe     Diabetic retinopathy (H)      GERD (gastroesophageal reflux disease)      Hyperlipidemia      Hypertension     ECHO 2013, TDS, NL EF     POAG (primary open-angle glaucoma)     adv BE     Seasonal allergies      Tubular  adenoma of colon 2013    repeat colonoscopy in 2018       Past Surgical History:   Procedure Laterality Date      SECTION       COLONOSCOPY  7/15/2013    Tubular adenoma; repeat in 2018;Procedure: COMBINED COLONOSCOPY, SINGLE BIOPSY/POLYPECTOMY BY BIOPSY;;  Surgeon: Don King MD;  Tubular adenoma     COLONOSCOPY N/A 2020    Procedure: COLONOSCOPY;  Surgeon: Sid Amanda MD;  Location: [facility] GI     DAVINCI HYSTERECTOMY TOTAL, BILATERAL SALPINGO-OOPHORECTOMY, COMBINED N/A 2019    Procedure: DaVinci Assisted Total Laparoscopic Hysterectomy, Removal Of Both Tubes And Ovaries;  Surgeon: Linh Cardoso MD;  Location: [facility] OR     EXTRACAPSULAR CATARACT EXTRATION WITH INTRAOCULAR LENS IMPLANT  11-10-09, 2-9-10    11-10-09 Lt, 2-9-10 Rt; Left eye 2012     STENT, CORONARY, DELORES  2009    RCA       Family History   Problem Relation Age of Onset     Hypertension Mother      Cerebrovascular Disease Mother      Glaucoma Father      Cancer Father      Diabetes Sister      Glaucoma Sister      Cancer - colorectal Other      Cerebrovascular Disease Other      Skin Cancer No family hx of      Melanoma No family hx of      Anesthesia Reaction No family hx of      Deep Vein Thrombosis (DVT) No family hx of        Social History     Socioeconomic History     Marital status: Single     Spouse name: Not on file     Number of children: Not on file     Years of education: Not on file     Highest education level: Not on file   Occupational History     Not on file   Social Needs     Financial resource strain: Not on file     Food insecurity:     Worry: Not on file     Inability: Not on file     Transportation needs:     Medical: Not on file     Non-medical: Not on file   Tobacco Use     Smoking status: Former Smoker     Packs/day: 1.50     Years: 45.00     Pack years: 67.50     Types: Cigarettes     Start date: 1968     Last attempt to quit: 3/6/2013     Years since quittin.4     Smokeless  tobacco: Never Used   Substance and Sexual Activity     Alcohol use: Not Currently     Drug use: Never     Sexual activity: Not Currently   Lifestyle     Physical activity:     Days per week: Not on file     Minutes per session: Not on file     Stress: Not on file   Relationships     Social connections:     Talks on phone: Not on file     Gets together: Not on file     Attends Hindu service: Not on file     Active member of club or organization: Not on file     Attends meetings of clubs or organizations: Not on file     Relationship status: Not on file     Intimate partner violence:     Fear of current or ex partner: Not on file     Emotionally abused: Not on file     Physically abused: Not on file     Forced sexual activity: Not on file   Other Topics Concern     Parent/sibling w/ CABG, MI or angioplasty before 65F 55M? Not Asked   Social History Narrative    Pt has three daughters and two sons, single.  Her daughter Court, see's her often at the Nursing Home ([facility]).  Moved into Nursing Home in October 2011 after a hospitalization for ALY.  Moved from [place] in 2002 to [facility]. Has 5 grandchildren.       Physical Exam   There were no vitals taken for this visit.   GENERAL: healthy, alert and no distress  RESP: no audible wheeze, cough.  No visible retractions or increased work of breathing.  Able to speak fully in complete sentences.  PSYCH: mentation appears normal, affect normal/bright, judgement and insight intact, normal speech and appearance well-groomed      RESULTS  Lab Results   Component Value Date    A1C 6.6 (H) 07/23/2019    A1C 7.5 (H) 10/24/2016    A1C 7.5 (H) 10/12/2015    A1C 8.1 (H) 03/06/2014    A1C 7.2 (H) 03/07/2013    HEMOGLOBINA1 6.6 (A) 01/06/2020    HEMOGLOBINA1 6.5 (A) 05/24/2019    HEMOGLOBINA1 7.1 (A) 10/10/2018    HEMOGLOBINA1 7.1 (A) 03/16/2018    HEMOGLOBINA1 7.3 (A) 06/05/2017    HEMOGLOBINA1 7.3 (A) 06/05/2017       TSH   Date Value Ref Range Status   10/24/2016  1.20 0.40 - 4.00 mU/L Final   10/12/2015 1.18 0.40 - 4.00 mU/L Final   08/21/2014 1.24 0.40 - 4.00 mU/L Final     Comment:     Effective 7/30/2014, the reference range for this assay has changed to reflect   new instrumentation/methodology.     08/05/2013 1.12 0.4 - 5.0 mU/L Final   07/15/2010 0.53 0.4 - 5.0 mU/L Final     T4 Total   Date Value Ref Range Status   10/30/2008 10.6 5.0 - 11.0 ug/dL Final     T4 Free   Date Value Ref Range Status   04/21/2006 1.04 0.70 - 1.85 ng/dL Final   09/23/2005 1.58 0.70 - 1.85 ng/dL Final       ALT   Date Value Ref Range Status   10/12/2015 39 0 - 50 U/L Final   03/06/2014 36 0 - 50 U/L Final   ]    Recent Labs   Lab Test 03/19/18 10/24/16  1301 10/12/15  1626 08/21/14  0935   CHOL 109  --   --  110   HDL  --   --   --  43*   LDL  --  48 51 43   TRIG  --   --   --  122   CHOLHDLRATIO  --   --   --  2.6       Lab Results   Component Value Date     08/06/2019      Lab Results   Component Value Date    POTASSIUM 4.8 08/06/2019     Lab Results   Component Value Date    CHLORIDE 104 08/06/2019     Lab Results   Component Value Date    OLAF 8.8 08/06/2019     Lab Results   Component Value Date    CO2 19 08/06/2019     Lab Results   Component Value Date    BUN 24 08/06/2019     Lab Results   Component Value Date    CR 0.98 08/06/2019       GFR Estimate   Date Value Ref Range Status   08/06/2019 59 (L) >60 mL/min/[1.73_m2] Final     Comment:     Non  GFR Calc  Starting 12/18/2018, serum creatinine based estimated GFR (eGFR) will be   calculated using the Chronic Kidney Disease Epidemiology Collaboration   (CKD-EPI) equation.     08/05/2019 50 (L) >60 mL/min/[1.73_m2] Final     Comment:     Non  GFR Calc  Starting 12/18/2018, serum creatinine based estimated GFR (eGFR) will be   calculated using the Chronic Kidney Disease Epidemiology Collaboration   (CKD-EPI) equation.     07/23/2019 48 (L) >60 mL/min/[1.73_m2] Final     Comment:     Non   American GFR Calc  Starting 12/18/2018, serum creatinine based estimated GFR (eGFR) will be   calculated using the Chronic Kidney Disease Epidemiology Collaboration   (CKD-EPI) equation.       GFR Estimate If Black   Date Value Ref Range Status   08/06/2019 69 >60 mL/min/[1.73_m2] Final     Comment:      GFR Calc  Starting 12/18/2018, serum creatinine based estimated GFR (eGFR) will be   calculated using the Chronic Kidney Disease Epidemiology Collaboration   (CKD-EPI) equation.     08/05/2019 58 (L) >60 mL/min/[1.73_m2] Final     Comment:      GFR Calc  Starting 12/18/2018, serum creatinine based estimated GFR (eGFR) will be   calculated using the Chronic Kidney Disease Epidemiology Collaboration   (CKD-EPI) equation.     07/23/2019 56 (L) >60 mL/min/[1.73_m2] Final     Comment:      GFR Calc  Starting 12/18/2018, serum creatinine based estimated GFR (eGFR) will be   calculated using the Chronic Kidney Disease Epidemiology Collaboration   (CKD-EPI) equation.         Lab Results   Component Value Date    MICROL <5 10/12/2015     No results found for: MICROALBUMIN  No results found for: CPEPT, GADAB, ISCAB    Vitamin B12   Date Value Ref Range Status   08/05/2013 506 >210 pg/mL Final     Comment:     Interp: 247-911 = Normal   ]    Most recent eye exam date: : Not Found     Care Everywhere Results:   A1c: 6.9% 3/18/20  TSH: 1.04 1/7/20  GFR 53  9/27/20  Creatinine: 1.22 9/27/20  LDL: 45 8/27/19  BMP: pending, drawn today        Assessment/Plan:      1.  Type 2 diabetes-  Ms. Preciado's insulin requirements have come down significantly.  Her appetite is less and sh has been losing weight.  Last week, we reduced her Toujeo to 72 units daily and this seems to be working well.  No further changes today.  Will check BMP today.  May need to reduce metformin if GFR has dropped less than 45. For now, she will remain on her current therapy.     2.  Risk factors- BP is well  controlled.  Will check for microalbuminuria, fasting lipids, vitamin B12, ALT.  MOS[facility] at [facility] will fax lab results.  No foot sores currently.  She is on gabapentin and cymbalta for neuropathy.  TSH normal in January, 2020. LDL 45    3. F/U in 3 months with Dr. Herrera, in 6 months with me.       Renetta Washington PA-C, MPAS   [facility]  Department of Medicine  Division of Endocrinology and Diabetes

## 2020-11-25 ENCOUNTER — RECORDS - HEALTHEAST (OUTPATIENT)
Dept: LAB | Facility: CLINIC | Age: 70
End: 2020-11-25

## 2020-11-27 LAB
ALT SERPL W P-5'-P-CCNC: 25 U/L (ref 0–45)
CHOLEST SERPL-MCNC: 103 MG/DL
FASTING STATUS PATIENT QL REPORTED: ABNORMAL
HBA1C MFR BLD: 6.5 %
HDLC SERPL-MCNC: 32 MG/DL
LDLC SERPL CALC-MCNC: 43 MG/DL
TRIGL SERPL-MCNC: 138 MG/DL
VIT B12 SERPL-MCNC: 340 PG/ML (ref 213–816)

## 2020-11-30 ENCOUNTER — OFFICE VISIT - HEALTHEAST (OUTPATIENT)
Dept: GERIATRICS | Facility: CLINIC | Age: 70
End: 2020-11-30

## 2020-11-30 DIAGNOSIS — Z86.73 HISTORY OF STROKE: ICD-10-CM

## 2020-11-30 DIAGNOSIS — G62.9 NEUROPATHY: ICD-10-CM

## 2020-11-30 DIAGNOSIS — I10 ESSENTIAL HYPERTENSION: ICD-10-CM

## 2020-11-30 DIAGNOSIS — E11.65 TYPE 2 DIABETES MELLITUS WITH HYPERGLYCEMIA, WITH LONG-TERM CURRENT USE OF INSULIN (H): ICD-10-CM

## 2020-11-30 DIAGNOSIS — Z79.4 TYPE 2 DIABETES MELLITUS WITH HYPERGLYCEMIA, WITH LONG-TERM CURRENT USE OF INSULIN (H): ICD-10-CM

## 2020-12-10 ENCOUNTER — MEDICAL CORRESPONDENCE (OUTPATIENT)
Dept: HEALTH INFORMATION MANAGEMENT | Facility: CLINIC | Age: 70
End: 2020-12-10

## 2020-12-15 ENCOUNTER — TELEPHONE (OUTPATIENT)
Dept: ENDOCRINOLOGY | Facility: CLINIC | Age: 70
End: 2020-12-15

## 2021-01-06 ENCOUNTER — OFFICE VISIT - HEALTHEAST (OUTPATIENT)
Dept: GERIATRICS | Facility: CLINIC | Age: 71
End: 2021-01-06

## 2021-01-06 DIAGNOSIS — I10 ESSENTIAL HYPERTENSION: ICD-10-CM

## 2021-01-06 DIAGNOSIS — E11.65 TYPE 2 DIABETES MELLITUS WITH HYPERGLYCEMIA, WITH LONG-TERM CURRENT USE OF INSULIN (H): ICD-10-CM

## 2021-01-06 DIAGNOSIS — Z79.4 TYPE 2 DIABETES MELLITUS WITH HYPERGLYCEMIA, WITH LONG-TERM CURRENT USE OF INSULIN (H): ICD-10-CM

## 2021-01-06 DIAGNOSIS — R53.81 PHYSICAL DECONDITIONING: ICD-10-CM

## 2021-01-10 ENCOUNTER — MEDICAL CORRESPONDENCE (OUTPATIENT)
Dept: HEALTH INFORMATION MANAGEMENT | Facility: CLINIC | Age: 71
End: 2021-01-10

## 2021-01-25 ENCOUNTER — COMMUNICATION - HEALTHEAST (OUTPATIENT)
Dept: GERIATRICS | Facility: CLINIC | Age: 71
End: 2021-01-25

## 2021-01-25 DIAGNOSIS — R52 PAIN: ICD-10-CM

## 2021-01-26 ENCOUNTER — COMMUNICATION - HEALTHEAST (OUTPATIENT)
Dept: GERIATRICS | Facility: CLINIC | Age: 71
End: 2021-01-26

## 2021-01-26 DIAGNOSIS — R52 PAIN: ICD-10-CM

## 2021-02-10 ENCOUNTER — MEDICAL CORRESPONDENCE (OUTPATIENT)
Dept: HEALTH INFORMATION MANAGEMENT | Facility: CLINIC | Age: 71
End: 2021-02-10

## 2021-02-19 ENCOUNTER — RECORDS - HEALTHEAST (OUTPATIENT)
Dept: LAB | Facility: CLINIC | Age: 71
End: 2021-02-19

## 2021-02-22 ENCOUNTER — COMMUNICATION - HEALTHEAST (OUTPATIENT)
Dept: GERIATRICS | Facility: CLINIC | Age: 71
End: 2021-02-22

## 2021-02-22 LAB
ERYTHROCYTE [DISTWIDTH] IN BLOOD BY AUTOMATED COUNT: 12.4 % (ref 11–14.5)
FERRITIN SERPL-MCNC: 449 NG/ML (ref 10–130)
HCT VFR BLD AUTO: 33.1 % (ref 35–47)
HGB BLD-MCNC: 10.5 G/DL (ref 12–16)
MCH RBC QN AUTO: 30.6 PG (ref 27–34)
MCHC RBC AUTO-ENTMCNC: 31.7 G/DL (ref 32–36)
MCV RBC AUTO: 97 FL (ref 80–100)
PLATELET # BLD AUTO: 192 THOU/UL (ref 140–440)
PMV BLD AUTO: 12.5 FL (ref 8.5–12.5)
RBC # BLD AUTO: 3.43 MILL/UL (ref 3.8–5.4)
WBC: 4.2 THOU/UL (ref 4–11)

## 2021-03-03 ENCOUNTER — OFFICE VISIT - HEALTHEAST (OUTPATIENT)
Dept: GERIATRICS | Facility: CLINIC | Age: 71
End: 2021-03-03

## 2021-03-03 DIAGNOSIS — E11.42 DIABETIC PERIPHERAL NEUROPATHY ASSOCIATED WITH TYPE 2 DIABETES MELLITUS (H): ICD-10-CM

## 2021-03-03 DIAGNOSIS — R53.81 PHYSICAL DECONDITIONING: ICD-10-CM

## 2021-03-03 DIAGNOSIS — R79.89 ELEVATED FERRITIN LEVEL: ICD-10-CM

## 2021-03-04 ENCOUNTER — COMMUNICATION - HEALTHEAST (OUTPATIENT)
Dept: GERIATRICS | Facility: CLINIC | Age: 71
End: 2021-03-04

## 2021-03-08 ENCOUNTER — COMMUNICATION - HEALTHEAST (OUTPATIENT)
Dept: GERIATRICS | Facility: CLINIC | Age: 71
End: 2021-03-08

## 2021-03-08 ENCOUNTER — RECORDS - HEALTHEAST (OUTPATIENT)
Dept: LAB | Facility: CLINIC | Age: 71
End: 2021-03-08

## 2021-03-08 LAB
ALBUMIN SERPL-MCNC: 3.3 G/DL (ref 3.5–5)
ALP SERPL-CCNC: 124 U/L (ref 45–120)
ALT SERPL W P-5'-P-CCNC: 35 U/L (ref 0–45)
AST SERPL W P-5'-P-CCNC: 27 U/L (ref 0–40)
BILIRUB DIRECT SERPL-MCNC: 0.1 MG/DL
BILIRUB SERPL-MCNC: 0.4 MG/DL (ref 0–1)
PROT SERPL-MCNC: 6.5 G/DL (ref 6–8)
TSH SERPL DL<=0.005 MIU/L-ACNC: 1.05 UIU/ML (ref 0.3–5)

## 2021-03-09 ENCOUNTER — OFFICE VISIT (OUTPATIENT)
Dept: OPHTHALMOLOGY | Facility: CLINIC | Age: 71
End: 2021-03-09
Attending: OPHTHALMOLOGY
Payer: COMMERCIAL

## 2021-03-09 DIAGNOSIS — H04.123 DRY EYES, BILATERAL: ICD-10-CM

## 2021-03-09 DIAGNOSIS — H40.1133 PRIMARY OPEN ANGLE GLAUCOMA (POAG) OF BOTH EYES, SEVERE STAGE: ICD-10-CM

## 2021-03-09 DIAGNOSIS — Z96.1 PSEUDOPHAKIA OF BOTH EYES: Primary | ICD-10-CM

## 2021-03-09 PROCEDURE — 99214 OFFICE O/P EST MOD 30 MIN: CPT | Performed by: OPHTHALMOLOGY

## 2021-03-09 PROCEDURE — G0463 HOSPITAL OUTPT CLINIC VISIT: HCPCS

## 2021-03-09 ASSESSMENT — TONOMETRY
IOP_METHOD: TONOPEN
OS_IOP_MMHG: 11
OD_IOP_MMHG: 12
IOP_METHOD: APPLANATION
OS_IOP_MMHG: 13
OD_IOP_MMHG: 19
OS_IOP_MMHG: 11
IOP_METHOD: TONOPEN

## 2021-03-09 ASSESSMENT — VISUAL ACUITY
METHOD: SNELLEN - LINEAR
OD_CC: 20/25SLOW
OD_CC+: -1
CORRECTION_TYPE: GLASSES
OS_PH_CC: 20/125
OS_CC: 20/125
OS_PH_CC+: +1

## 2021-03-09 ASSESSMENT — REFRACTION_WEARINGRX
OD_CYLINDER: +0.75
OS_ADD: +2.50
OS_SPHERE: -0.75
OD_ADD: +2.50
OS_CYLINDER: +0.75
OD_AXIS: 170
OS_AXIS: 025
OD_SPHERE: -1.00

## 2021-03-09 ASSESSMENT — REFRACTION_MANIFEST
OD_SPHERE: -1.25
OD_AXIS: 170
OD_CYLINDER: +0.75
OS_ADD: +2.75
OD_ADD: +2.75
OS_CYLINDER: +0.75
OS_AXIS: 025
OS_SPHERE: -0.75

## 2021-03-09 ASSESSMENT — CUP TO DISC RATIO
OD_RATIO: 0.8
OS_RATIO: 0.8

## 2021-03-09 ASSESSMENT — CONF VISUAL FIELD
OS_INFERIOR_NASAL_RESTRICTION: 3
METHOD: COUNTING FINGERS
OS_SUPERIOR_TEMPORAL_RESTRICTION: 3
OD_NORMAL: 1
OS_INFERIOR_TEMPORAL_RESTRICTION: 3
OS_SUPERIOR_NASAL_RESTRICTION: 3

## 2021-03-09 ASSESSMENT — SLIT LAMP EXAM - LIDS
COMMENTS: NORMAL
COMMENTS: NORMAL

## 2021-03-09 ASSESSMENT — PATIENT HEALTH QUESTIONNAIRE - PHQ9: SUM OF ALL RESPONSES TO PHQ QUESTIONS 1-9: 0

## 2021-03-09 NOTE — PROGRESS NOTES
Chief Complaint/Presenting Concern: Here for glaucoma evaluation    History of Present Illness:   Cristal Preciado is a 70 year old patient who presents for evaluation of glaucoma. Patient complains of itching and excessive tearing in both eyes. No recent change in her vision and no eye pain.     Relevant Past Medical/Family/Social History: CAD, HTN    Relevant Review of Systems: None Relevant      Diagnosis: Primary open angle glaucoma advanced each eye   Previous glaucoma surgery/laser: [place] both eyes, Right eye 9/05, Left eye 5/04  Maximum intraocular pressure unknown  Currently Meds:Latanoprost both eyes at bedtime, Cosopt both eyes twice a day  Meds AEs/intolerance: unknown   Cannot do visual field and OCT with floor effect-would not repeat    Today's testing:  IOP low-mid teens by applanation     Additional Ocular History:    2. NAION left eye    3. Pseudophakic each eye   - Right eye 2-9-10  - Left eye 8-14-12    4. Diabetes mellitus-no retinopathy  OCT with no edema today (3/9/31)    5. Allergic conjunctivitis each eye   - using Pataday as needed     Plan/Recommendations:    Discussed findings with patient.    Return every 6 months to make sure tubes still covered (covered with conjunctiva only)    Continue Latanoprost both eyes at bedtime    Continue Cosopt both eyes twice a day    Continue Pataday as needed    Continue artificial tears as needed     RTC 6 months     Physician Attestation     Attending Physician Attestation:  Complete documentation of historical and exam elements from today's encounter can be found in the full encounter summary report (not reduplicated in this progress note). I personally obtained the chief complaint(s) and history of present illness. I confirmed and edited as necessary the review of systems, past medical/surgical history, family history, social history, and examination findings as documented by others; and I examined the patient myself. I personally reviewed the  relevant tests, images, and reports as documented above. I personally reviewed the ophthalmic test(s) associated with this encounter. I formulated and edited as necessary the assessment and plan and discussed the findings and management plan with the patient and any family members present at the time of the visit.  April Luis M.D., Glaucoma, March 9, 2021

## 2021-03-09 NOTE — NURSING NOTE
Chief Complaints and History of Present Illnesses   Patient presents with     Follow Up     Primary open angle glaucoma of both eyes, severe stage     Chief Complaint(s) and History of Present Illness(es)     Follow Up     Laterality: both eyes    Onset: gradual    Onset: years ago    Course: gradually worsening    Associated symptoms: burning, itching, dryness, tearing, flashes and floaters (No change.).  Negative for eye pain    Treatments tried: eye drops    Response to treatment: no improvement    Pain scale: 0/10    Comments: Primary open angle glaucoma of both eyes, severe stage              Comments     Pt states vision is worse since last visit.  According to Pt's care home chart she is getting her drops.  She is having a lot of burning and itching BE particularly at night.  Pt reports new red/orange flashes RE for about a month.    Latanoprost both eyes at bedtime   Cosopt both eyes twice a day  Pataday as needed for allergy     DM 2  Lab Results       Component                Value               Date                       A1C                      6.6                 07/23/2019                 A1C                      7.5                 10/24/2016                 A1C                      7.5                 10/12/2015                 A1C                      8.1                 03/06/2014                 A1C                      7.2                 03/07/2013            BS were 197 this am.    FAROOQ Cook March 9, 2021 3:23 PM

## 2021-03-09 NOTE — PATIENT INSTRUCTIONS
Continue Cosopt (Timolol/Dorzolamide) which is a blue top- apply 1 drop twice a day in both eyes     Continue Latanoprost (green top)- apply once drop at bedtime to both eyes     Continue Pataday daily each eye     Use Artificial tears as needed

## 2021-03-10 ENCOUNTER — MEDICAL CORRESPONDENCE (OUTPATIENT)
Dept: HEALTH INFORMATION MANAGEMENT | Facility: CLINIC | Age: 71
End: 2021-03-10

## 2021-03-30 ENCOUNTER — OFFICE VISIT - HEALTHEAST (OUTPATIENT)
Dept: GERIATRICS | Facility: CLINIC | Age: 71
End: 2021-03-30

## 2021-03-30 DIAGNOSIS — I10 ESSENTIAL HYPERTENSION: ICD-10-CM

## 2021-03-30 DIAGNOSIS — E11.69 TYPE 2 DIABETES MELLITUS WITH OTHER SPECIFIED COMPLICATION, WITH LONG-TERM CURRENT USE OF INSULIN (H): ICD-10-CM

## 2021-03-30 DIAGNOSIS — G62.9 NEUROPATHY: ICD-10-CM

## 2021-03-30 DIAGNOSIS — Z79.4 TYPE 2 DIABETES MELLITUS WITH OTHER SPECIFIED COMPLICATION, WITH LONG-TERM CURRENT USE OF INSULIN (H): ICD-10-CM

## 2021-03-30 DIAGNOSIS — Z86.73 HISTORY OF STROKE: ICD-10-CM

## 2021-04-11 ENCOUNTER — MEDICAL CORRESPONDENCE (OUTPATIENT)
Dept: HEALTH INFORMATION MANAGEMENT | Facility: CLINIC | Age: 71
End: 2021-04-11

## 2021-04-13 ENCOUNTER — TELEPHONE (OUTPATIENT)
Dept: ENDOCRINOLOGY | Facility: CLINIC | Age: 71
End: 2021-04-13

## 2021-04-13 NOTE — TELEPHONE ENCOUNTER
I received a fax from [facility] at nursing home.  Glucose has been running higher than in the past.  No illnesses, infections, etc.  Advised to increase Toujeo to 80 units daily (was 72 units).  They will let me know if she has lows.      We also set up a follow up visit for 5/10 at 10am.     Phone number: [phone]- nurse Olu Jackson

## 2021-05-05 ENCOUNTER — OFFICE VISIT - HEALTHEAST (OUTPATIENT)
Dept: GERIATRICS | Facility: CLINIC | Age: 71
End: 2021-05-05

## 2021-05-05 DIAGNOSIS — Z79.4 TYPE 2 DIABETES MELLITUS WITH HYPERGLYCEMIA, WITH LONG-TERM CURRENT USE OF INSULIN (H): ICD-10-CM

## 2021-05-05 DIAGNOSIS — E11.65 TYPE 2 DIABETES MELLITUS WITH HYPERGLYCEMIA, WITH LONG-TERM CURRENT USE OF INSULIN (H): ICD-10-CM

## 2021-05-05 DIAGNOSIS — E11.42 DIABETIC PERIPHERAL NEUROPATHY ASSOCIATED WITH TYPE 2 DIABETES MELLITUS (H): ICD-10-CM

## 2021-05-10 ENCOUNTER — VIRTUAL VISIT (OUTPATIENT)
Dept: ENDOCRINOLOGY | Facility: CLINIC | Age: 71
End: 2021-05-10
Payer: COMMERCIAL

## 2021-05-10 DIAGNOSIS — E11.21 TYPE 2 DIABETES MELLITUS WITH DIABETIC NEPHROPATHY, WITH LONG-TERM CURRENT USE OF INSULIN (H): Primary | ICD-10-CM

## 2021-05-10 DIAGNOSIS — N18.30 STAGE 3 CHRONIC KIDNEY DISEASE, UNSPECIFIED WHETHER STAGE 3A OR 3B CKD (H): ICD-10-CM

## 2021-05-10 DIAGNOSIS — Z79.4 TYPE 2 DIABETES MELLITUS WITH DIABETIC NEPHROPATHY, WITH LONG-TERM CURRENT USE OF INSULIN (H): Primary | ICD-10-CM

## 2021-05-10 PROCEDURE — 99214 OFFICE O/P EST MOD 30 MIN: CPT | Performed by: PHYSICIAN ASSISTANT

## 2021-05-10 NOTE — PROGRESS NOTES
Outcome for 05/10/21 7:25 AM :Glucose sent via Email      HPI   Ms. Preciado is on the phone for a follow up virtual visit for long standing type 2 diabetes.  Her nurse, Rosalva is also on the phone.   Rosalva sends glucose values every 2 weeks and we have been adjusting her insulin.  A couple weeks ago, her glucose was running higher, so we increased her Toujeo to 80 units.  Her most recent weight is 177.5#.  Her appetite is fair.  Hard boiled egg/toast/cereal.  Lunch- she picks a bit.  She is not snacking much.  She feels like she picks a lot.  Some things she does not like.  She usually eats cereal if she does not like what is on the menu. She went out to see her kids for mother's day yesterday and enjoyed.      She is currently being managed on Toujeo 80 units every morning (switched from U-500 in ) and Metformin 1000 mg daily (added in ), Victoza 1.8 mg daily (several years).  She likes this much better than her previous regimen.  Her insulin requirements have slowly decreased since the addition of Metformin.            When she gets up in the morning her hands and feet are tingling.  She is taking gabapentin for neuropathy.  She is also on Cymbalta.  She has no open sores.  She sees a nurse practitioner in her care facility regularly.  She does have a pressure sore on her bottom and is seeing a wound nurse regularly.  She otherwise has been feeling well and in her usual state of health. She has no other concerns today.     PMH   Type 2 diabetes  Neuropathy   Nephropathy  Stroke - mainly in wheelchair. Would like to start to walk again.    CAD, s/p stent   HTN  Dyslipidemia  Cataracts  Glaucoma  GERD  Pressure sore on bottom.     Family Hx:   Mom- stroke  Dad- cancer  1 of 12 children  2 brothers and 2 sisters-  cancer- unsure of type  1 sister with diabetes, on insulin  5 children  Son- MS, milder  Daughter, April- MS  Other kids- healthy  6 grandchildren    Social Hx:   Ms. Preciado lives  "at [facility].  She keeps herself busy with many activities in the day, exercises (\"light and lively\"), crafts, coloring, baking, light bowling. She has many friends in their 90's there and she enjoys their company. She is seeing her family about 1-2 times a month now.     Has 5 children, all now living in [facility].  6 grandchildren.  Originally from [facility].     Current Medications  Current Outpatient Medications   Medication Sig Dispense Refill     acetaminophen (TYLENOL) 500 MG tablet Take 1,000 mg by mouth 3 times daily Tylenol Extra Strength       ASPIRIN LOW DOSE 81 MG chewable tablet CHEW AND SWALLOW 1 TAB BY MOUTH ONCE DAILY IN THE MORNING  99     atorvastatin (LIPITOR) 80 MG tablet Take 1 tablet by mouth daily. Pt needs to have labs done prior to further refills. (Patient taking differently: Take 80 mg by mouth At Bedtime Pt needs to have labs done prior to further refills.) 90 tablet 0     carboxymethylcellulose (REFRESH PLUS) 0.5 % SOLN 1 drop 3 times daily as needed.       CHLORTHALIDONE PO Take 25 mg by mouth every morning        dorzolamide-timolol 2-0.5 % OP ophthalmic solution Place 1 drop into both eyes 2 times daily 1 Bottle 11     DULoxetine HCl (CYMBALTA PO) Take 60 mg by mouth every morning        Elastic Bandages & Supports (JOBST KNEE HIGH COMPRESSION SM) MISC JOBST 20-30MMHG COMPRESSION SM MISC   To both legs during waking hours daily for leg swelling and venous stasis dermatitis. Do not sleep in stockings. 2 each 3     ferrous sulfate (IRON) 325 (65 FE) MG tablet Take 325 mg by mouth daily (with lunch)        folic acid (FOLVITE) 1 MG tablet Take 1 mg by mouth every morning        gabapentin (NEURONTIN) 600 MG tablet Take 600 mg in AM , 600 mg afternoon and 900 mg at HS. (Patient taking differently: Take 300 mg by mouth 3 times daily ) 90 tablet 1     Insulin Glargine, 2 Unit Dial, 300 UNIT/ML SOPN Inject 80 Units Subcutaneous daily 80 mL 3     insulin pen needle (B-D " U/F) 31G X 5 MM Use 5 time(s) per day.  Please dispense as BD Pen Needle Mini U/F 31G x 5  each 11     ketoconazole (NIZORAL) 2 % shampoo To entire wet scalp and ears and then wash off after 5 minutes three times a week. (Patient taking differently: Apply topically twice a week To entire wet scalp and ears and then wash off after 5 minutes two times a week.) 240 mL 11     latanoprost (XALATAN) 0.005 % ophthalmic solution 1 drop At Bedtime. Left eye       liraglutide (VICTOZA) 18 MG/3ML SOLN Inject 1.8 mg Subcutaneous daily. Start with 0.6 mg x 5 days, then increase to 1.2 mg x 5 days, then 1.8 mg thereafter.  Do not advance to higher dose if you have nausea. (Patient taking differently: Inject 1.8 mg Subcutaneous every morning ) 3 Month 3     lisinopril (PRINIVIL,ZESTRIL) 5 MG tablet Take 2 tablets by mouth daily. Take one tab daily/Hold for SBP < 110 90 tablet 3     loratadine (CLARITIN) 10 MG tablet Take 10 mg by mouth as needed.       metFORMIN (GLUCOPHAGE-XR) 500 MG 24 hr tablet Take 2 tablets (1,000 mg) by mouth daily (with dinner) (Patient taking differently: Take 1,000 mg by mouth every morning ) 180 tablet 3     metoprolol (LOPRESSOR) 10 mg/mL SUSP Take 100 mg by mouth 2 times daily       nitroFURantoin macrocrystal-monohydrate (MACROBID) 100 MG capsule Take 1 capsule (100 mg) by mouth every 12 hours For total of 5 days       nitroFURantoin macrocrystal-monohydrate (MACROBID) 100 MG capsule Take 1 capsule (100 mg) by mouth 2 times daily 10 capsule 0     nitroGLYCERIN (NITROSTAT) 0.4 MG SL tablet Place 0.4 mg under the tongue every 5 minutes as needed.       olopatadine HCl (PATADAY) 0.2 % SOLN Place 1 drop into both eyes daily as needed For itchy eyes (Patient taking differently: Place 1 drop into both eyes as needed For itchy eyes) 1 Bottle 5     omeprazole (PRILOSEC) 20 MG DR capsule Take 20 mg by mouth       oxyCODONE (ROXICODONE) 5 MG tablet Take 1 tablet (5 mg) by mouth every 6 hours as needed for  severe pain 10 tablet 0     Pregabalin (LYRICA PO) Take 75 mg by mouth 3 times daily        ranitidine (ZANTAC) 150 MG tablet Take 150 mg by mouth 2 times daily       senna-docusate (SENOKOT-S/PERICOLACE) 8.6-50 MG tablet Take 2 tablets by mouth 2 times daily as needed for constipation 60 tablet 0     traMADol (ULTRAM) 50 MG tablet Take 1 tablet (50 mg) by mouth as needed (HOLD IF STILL TAKING OXYCODONE FOR POST OP PAIN)       triamcinolone (KENALOG) 0.1 % ointment Apply topically daily To legs under compression stockings for stasis dermatitis discoloration. 60 g 5     Past Medical History:   Diagnosis Date     AION (anterior ischaemic optic neuropathy), left eye     NAION LE     CAD (coronary artery disease) 3/2009    [facility]; Angio  [facility]- normal coronary arteries     Cataract      CVA (cerebral vascular accident) (H)     admitted at [facility]     on Cymbalta     Diabetes mellitus, type 2 (H)      Diabetic nephropathy (H)      Diabetic neuropathy (H)     severe     Diabetic retinopathy (H)      GERD (gastroesophageal reflux disease)      Hyperlipidemia      Hypertension     ECHO 2013, TDS, NL EF     POAG (primary open-angle glaucoma)     adv BE     Seasonal allergies      Tubular adenoma of colon     repeat colonoscopy in        Past Surgical History:   Procedure Laterality Date      SECTION       COLONOSCOPY  7/15/2013    Tubular adenoma; repeat in 2018;Procedure: COMBINED COLONOSCOPY, SINGLE BIOPSY/POLYPECTOMY BY BIOPSY;;  Surgeon: Don King MD;  Tubular adenoma     COLONOSCOPY N/A 2020    Procedure: COLONOSCOPY;  Surgeon: Sid Amanda MD;  Location: [facility] GI     DAVINCI HYSTERECTOMY TOTAL, BILATERAL SALPINGO-OOPHORECTOMY, COMBINED N/A 2019    Procedure: DaVinci Assisted Total Laparoscopic Hysterectomy, Removal Of Both Tubes And Ovaries;  Surgeon: Linh Cardoso MD;  Location: [facility] OR     EXTRACAPSULAR CATARACT EXTRATION WITH INTRAOCULAR  LENS IMPLANT  11-10-09, 2-9-10    11-10-09 Lt, 2-9-10 Rt; Left eye 2012     STENT, CORONARY, DELORES  2009    RCA       Family History   Problem Relation Age of Onset     Hypertension Mother      Cerebrovascular Disease Mother      Glaucoma Father      Cancer Father      Diabetes Sister      Glaucoma Sister      Cancer - colorectal Other      Cerebrovascular Disease Other      Skin Cancer No family hx of      Melanoma No family hx of      Anesthesia Reaction No family hx of      Deep Vein Thrombosis (DVT) No family hx of        Social History     Socioeconomic History     Marital status: Single     Spouse name: Not on file     Number of children: Not on file     Years of education: Not on file     Highest education level: Not on file   Occupational History     Not on file   Social Needs     Financial resource strain: Not on file     Food insecurity:     Worry: Not on file     Inability: Not on file     Transportation needs:     Medical: Not on file     Non-medical: Not on file   Tobacco Use     Smoking status: Former Smoker     Packs/day: 1.50     Years: 45.00     Pack years: 67.50     Types: Cigarettes     Start date: 1968     Last attempt to quit: 3/6/2013     Years since quittin.4     Smokeless tobacco: Never Used   Substance and Sexual Activity     Alcohol use: Not Currently     Drug use: Never     Sexual activity: Not Currently   Lifestyle     Physical activity:     Days per week: Not on file     Minutes per session: Not on file     Stress: Not on file   Relationships     Social connections:     Talks on phone: Not on file     Gets together: Not on file     Attends [employer] service: Not on file     Active member of club or organization: Not on file     Attends meetings of clubs or organizations: Not on file     Relationship status: Not on file     Intimate partner violence:     Fear of current or ex partner: Not on file     Emotionally abused: Not on file     Physically abused: Not on file     Forced  sexual activity: Not on file   Other Topics Concern     Parent/sibling w/ CABG, MI or angioplasty before 65F 55M? Not Asked   Social History Narrative    Pt has three daughters and two sons, single.  Her daughter Court, see's her often at the Nursing Home ([facility]).  Moved into Nursing Home in October 2011 after a hospitalization for ALY.  Moved from [place] in 2002 to [facility]. Has 5 grandchildren.       Physical Exam   There were no vitals taken for this visit.   GENERAL: healthy, alert and no distress  RESP: no audible wheeze, cough.  No visible retractions or increased work of breathing.  Able to speak fully in complete sentences.  PSYCH: mentation appears normal, affect normal/bright, judgement and insight intact, normal speech and appearance well-groomed    RESULTS  Lab Results   Component Value Date    A1C 6.6 (H) 07/23/2019    A1C 7.5 (H) 10/24/2016    A1C 7.5 (H) 10/12/2015    A1C 8.1 (H) 03/06/2014    A1C 7.2 (H) 03/07/2013    HEMOGLOBINA1 6.6 (A) 01/06/2020    HEMOGLOBINA1 6.5 (A) 05/24/2019    HEMOGLOBINA1 7.1 (A) 10/10/2018    HEMOGLOBINA1 7.1 (A) 03/16/2018    HEMOGLOBINA1 7.3 (A) 06/05/2017    HEMOGLOBINA1 7.3 (A) 06/05/2017       TSH   Date Value Ref Range Status   10/24/2016 1.20 0.40 - 4.00 mU/L Final   10/12/2015 1.18 0.40 - 4.00 mU/L Final   08/21/2014 1.24 0.40 - 4.00 mU/L Final     Comment:     Effective 7/30/2014, the reference range for this assay has changed to reflect   new instrumentation/methodology.     08/05/2013 1.12 0.4 - 5.0 mU/L Final   07/15/2010 0.53 0.4 - 5.0 mU/L Final     T4 Total   Date Value Ref Range Status   10/30/2008 10.6 5.0 - 11.0 ug/dL Final     T4 Free   Date Value Ref Range Status   04/21/2006 1.04 0.70 - 1.85 ng/dL Final   09/23/2005 1.58 0.70 - 1.85 ng/dL Final       ALT   Date Value Ref Range Status   10/12/2015 39 0 - 50 U/L Final   03/06/2014 36 0 - 50 U/L Final   ]    Recent Labs   Lab Test 03/19/18 10/24/16  1301 10/12/15  1626 08/21/14  0934    CHOL 109  --   --  110   HDL  --   --   --  43*   LDL  --  48 51 43   TRIG  --   --   --  122   CHOLHDLRATIO  --   --   --  2.6       Lab Results   Component Value Date     08/06/2019      Lab Results   Component Value Date    POTASSIUM 4.8 08/06/2019     Lab Results   Component Value Date    CHLORIDE 104 08/06/2019     Lab Results   Component Value Date    OLAF 8.8 08/06/2019     Lab Results   Component Value Date    CO2 19 08/06/2019     Lab Results   Component Value Date    BUN 24 08/06/2019     Lab Results   Component Value Date    CR 0.98 08/06/2019       GFR Estimate   Date Value Ref Range Status   08/06/2019 59 (L) >60 mL/min/[1.73_m2] Final     Comment:     Non  GFR Calc  Starting 12/18/2018, serum creatinine based estimated GFR (eGFR) will be   calculated using the Chronic Kidney Disease Epidemiology Collaboration   (CKD-EPI) equation.     08/05/2019 50 (L) >60 mL/min/[1.73_m2] Final     Comment:     Non  GFR Calc  Starting 12/18/2018, serum creatinine based estimated GFR (eGFR) will be   calculated using the Chronic Kidney Disease Epidemiology Collaboration   (CKD-EPI) equation.     07/23/2019 48 (L) >60 mL/min/[1.73_m2] Final     Comment:     Non  GFR Calc  Starting 12/18/2018, serum creatinine based estimated GFR (eGFR) will be   calculated using the Chronic Kidney Disease Epidemiology Collaboration   (CKD-EPI) equation.       GFR Estimate If Black   Date Value Ref Range Status   08/06/2019 69 >60 mL/min/[1.73_m2] Final     Comment:      GFR Calc  Starting 12/18/2018, serum creatinine based estimated GFR (eGFR) will be   calculated using the Chronic Kidney Disease Epidemiology Collaboration   (CKD-EPI) equation.     08/05/2019 58 (L) >60 mL/min/[1.73_m2] Final     Comment:      GFR Calc  Starting 12/18/2018, serum creatinine based estimated GFR (eGFR) will be   calculated using the Chronic Kidney Disease Epidemiology  Collaboration   (CKD-EPI) equation.     07/23/2019 56 (L) >60 mL/min/[1.73_m2] Final     Comment:      GFR Calc  Starting 12/18/2018, serum creatinine based estimated GFR (eGFR) will be   calculated using the Chronic Kidney Disease Epidemiology Collaboration   (CKD-EPI) equation.         Lab Results   Component Value Date    MICROL <5 10/12/2015     No results found for: MICROALBUMIN  No results found for: CPEPT, GADAB, ISCAB    Vitamin B12   Date Value Ref Range Status   08/05/2013 506 >210 pg/mL Final     Comment:     Interp: 247-911 = Normal   ]    Most recent eye exam date: : Not Found     Care Everywhere Results:   A1c: 6.9% 3/18/20  TSH: 1.04 1/7/20  GFR 53  9/27/20  Creatinine: 1.22 9/27/20  LDL: 45 8/27/19  BMP: pending, drawn today        Assessment/Plan:      1.  Type 2 diabetes-  Ms. Preciado's glucose is well controlled at present.  She does have microalbuminuria, so we discussed the role of SGLT-2 inhibitors with regard to improved renal and cardiovascular outcomes.  We discussed risks, side effects including thirst, frequent urination, dehydration, weight loss, yeast infection.  She is interested in starting Jardiance 10 mg daily. She was advised to drink more water.  I will also get in touch with Shawna Mesa CNP so they can carefully monitor her blood pressure.  I have advised her to reduce Toujeo to 72 units.  We may need to reduce further if she develops hypoglycemia (currently she is running a bit above target).  No other changes.  We will check in again in 1 month to assess her progress.      2.  Risk factors- BP is well controlled.  She does have microalbuminuria (54.1 11/23/20).  She is on gabapentin and cymbalta for neuropathy. GFR >60 in 11/20. Will f/u with PCP to discuss ongoing symptoms.  TSH normal in January, 2020. LDL 45    3. F/U in 3 months with Dr. Herrera, in 6 months with me.       37 minutes spent on the date of the encounter doing chart review, review of test  results, review and interpretation of glucose data, patient visit and documentation, counseling/coordination of care, and discussion of follow up plan for worsening hyper and hypoglycemia.  The patient understood and is satisfied with today's visit.     Renetta Washington PA-C, MPAS   [facility]  Department of Medicine  Division of Endocrinology and Diabetes

## 2021-05-10 NOTE — LETTER
5/10/2021       RE: Cristal Preciado  [facility]  [address]  [place] [address]     Dear Colleague,    Thank you for referring your patient, Cristal Preciado, to the [facility] [place] [facility]. Please see a copy of my visit note below.    Outcome for 05/10/21 7:25 AM :Glucose sent via Email      HPI   Ms. Preciado is on the phone for a follow up virtual visit for long standing type 2 diabetes.  Her nurse, Rosalva is also on the phone.   Rosalva sends glucose values every 2 weeks and we have been adjusting her insulin.  A couple weeks ago, her glucose was running higher, so we increased her Toujeo to 80 units.  Her most recent weight is 177.5#.  Her appetite is fair.  Hard boiled egg/toast/cereal.  Lunch- she picks a bit.  She is not snacking much.  She feels like she picks a lot.  Some things she does not like.  She usually eats cereal if she does not like what is on the menu. She went out to see her kids for mother's day yesterday and enjoyed.      She is currently being managed on Toujeo 80 units every morning (switched from U-500 in 2019) and Metformin 1000 mg daily (added in 2018), Victoza 1.8 mg daily (several years).  She likes this much better than her previous regimen.  Her insulin requirements have slowly decreased since the addition of Metformin.            When she gets up in the morning her hands and feet are tingling.  She is taking gabapentin for neuropathy.  She is also on Cymbalta.  She has no open sores.  She sees a nurse practitioner in her care facility regularly.  She does have a pressure sore on her bottom and is seeing a wound nurse regularly.  She otherwise has been feeling well and in her usual state of health. She has no other concerns today.     PMH   Type 2 diabetes  Neuropathy   Nephropathy  Stroke 2010- mainly in wheelchair. Would like to start to walk again.    CAD, s/p  "stent   HTN  Dyslipidemia  Cataracts  Glaucoma  GERD  Pressure sore on bottom.     Family Hx:   Mom- stroke  Dad- cancer  1 of 12 children  2 brothers and 2 sisters-  cancer- unsure of type  1 sister with diabetes, on insulin  5 children  Son- MS, milder  Daughter, April- MS  Other kids- healthy  6 grandchildren    Social Hx:   Ms. Preciado lives at [facility].  She keeps herself busy with many activities in the day, exercises (\"light and lively\"), crafts, coloring, baking, light bowling. She has many friends in their 90's there and she enjoys their company. She is seeing her family about 1-2 times a month now.     Has 5 children, all now living in [facility].  6 grandchildren.  Originally from [facility].     Current Medications  Current Outpatient Medications   Medication Sig Dispense Refill     acetaminophen (TYLENOL) 500 MG tablet Take 1,000 mg by mouth 3 times daily Tylenol Extra Strength       ASPIRIN LOW DOSE 81 MG chewable tablet CHEW AND SWALLOW 1 TAB BY MOUTH ONCE DAILY IN THE MORNING  99     atorvastatin (LIPITOR) 80 MG tablet Take 1 tablet by mouth daily. Pt needs to have labs done prior to further refills. (Patient taking differently: Take 80 mg by mouth At Bedtime Pt needs to have labs done prior to further refills.) 90 tablet 0     carboxymethylcellulose (REFRESH PLUS) 0.5 % SOLN 1 drop 3 times daily as needed.       CHLORTHALIDONE PO Take 25 mg by mouth every morning        dorzolamide-timolol 2-0.5 % OP ophthalmic solution Place 1 drop into both eyes 2 times daily 1 Bottle 11     DULoxetine HCl (CYMBALTA PO) Take 60 mg by mouth every morning        Elastic Bandages & Supports (JOBST KNEE HIGH COMPRESSION SM) MISC JOBST 20-30MMHG COMPRESSION SM MISC   To both legs during waking hours daily for leg swelling and venous stasis dermatitis. Do not sleep in stockings. 2 each 3     ferrous sulfate (IRON) 325 (65 FE) MG tablet Take 325 mg by mouth daily (with lunch)        folic " acid (FOLVITE) 1 MG tablet Take 1 mg by mouth every morning        gabapentin (NEURONTIN) 600 MG tablet Take 600 mg in AM , 600 mg afternoon and 900 mg at HS. (Patient taking differently: Take 300 mg by mouth 3 times daily ) 90 tablet 1     Insulin Glargine, 2 Unit Dial, 300 UNIT/ML SOPN Inject 80 Units Subcutaneous daily 80 mL 3     insulin pen needle (B-D U/F) 31G X 5 MM Use 5 time(s) per day.  Please dispense as BD Pen Needle Mini U/F 31G x 5  each 11     ketoconazole (NIZORAL) 2 % shampoo To entire wet scalp and ears and then wash off after 5 minutes three times a week. (Patient taking differently: Apply topically twice a week To entire wet scalp and ears and then wash off after 5 minutes two times a week.) 240 mL 11     latanoprost (XALATAN) 0.005 % ophthalmic solution 1 drop At Bedtime. Left eye       liraglutide (VICTOZA) 18 MG/3ML SOLN Inject 1.8 mg Subcutaneous daily. Start with 0.6 mg x 5 days, then increase to 1.2 mg x 5 days, then 1.8 mg thereafter.  Do not advance to higher dose if you have nausea. (Patient taking differently: Inject 1.8 mg Subcutaneous every morning ) 3 Month 3     lisinopril (PRINIVIL,ZESTRIL) 5 MG tablet Take 2 tablets by mouth daily. Take one tab daily/Hold for SBP < 110 90 tablet 3     loratadine (CLARITIN) 10 MG tablet Take 10 mg by mouth as needed.       metFORMIN (GLUCOPHAGE-XR) 500 MG 24 hr tablet Take 2 tablets (1,000 mg) by mouth daily (with dinner) (Patient taking differently: Take 1,000 mg by mouth every morning ) 180 tablet 3     metoprolol (LOPRESSOR) 10 mg/mL SUSP Take 100 mg by mouth 2 times daily       nitroFURantoin macrocrystal-monohydrate (MACROBID) 100 MG capsule Take 1 capsule (100 mg) by mouth every 12 hours For total of 5 days       nitroFURantoin macrocrystal-monohydrate (MACROBID) 100 MG capsule Take 1 capsule (100 mg) by mouth 2 times daily 10 capsule 0     nitroGLYCERIN (NITROSTAT) 0.4 MG SL tablet Place 0.4 mg under the tongue every 5 minutes as  needed.       olopatadine HCl (PATADAY) 0.2 % SOLN Place 1 drop into both eyes daily as needed For itchy eyes (Patient taking differently: Place 1 drop into both eyes as needed For itchy eyes) 1 Bottle 5     omeprazole (PRILOSEC) 20 MG DR capsule Take 20 mg by mouth       oxyCODONE (ROXICODONE) 5 MG tablet Take 1 tablet (5 mg) by mouth every 6 hours as needed for severe pain 10 tablet 0     Pregabalin (LYRICA PO) Take 75 mg by mouth 3 times daily        ranitidine (ZANTAC) 150 MG tablet Take 150 mg by mouth 2 times daily       senna-docusate (SENOKOT-S/PERICOLACE) 8.6-50 MG tablet Take 2 tablets by mouth 2 times daily as needed for constipation 60 tablet 0     traMADol (ULTRAM) 50 MG tablet Take 1 tablet (50 mg) by mouth as needed (HOLD IF STILL TAKING OXYCODONE FOR POST OP PAIN)       triamcinolone (KENALOG) 0.1 % ointment Apply topically daily To legs under compression stockings for stasis dermatitis discoloration. 60 g 5     Past Medical History:   Diagnosis Date     AION (anterior ischaemic optic neuropathy), left eye     NAION LE     CAD (coronary artery disease) 3/2009    [facility]; Angio  UM- normal coronary arteries     Cataract      CVA (cerebral vascular accident) (H)     admitted at [facility]     on Cymbalta     Diabetes mellitus, type 2 (H)      Diabetic nephropathy (H)      Diabetic neuropathy (H)     severe     Diabetic retinopathy (H)      GERD (gastroesophageal reflux disease)      Hyperlipidemia      Hypertension     ECHO 2013, TDS, NL EF     POAG (primary open-angle glaucoma)     adv BE     Seasonal allergies      Tubular adenoma of colon 2013    repeat colonoscopy in 2018       Past Surgical History:   Procedure Laterality Date      SECTION       COLONOSCOPY  7/15/2013    Tubular adenoma; repeat in 2018;Procedure: COMBINED COLONOSCOPY, SINGLE BIOPSY/POLYPECTOMY BY BIOPSY;;  Surgeon: Don King MD;  Tubular adenoma     COLONOSCOPY N/A 2020     Procedure: COLONOSCOPY;  Surgeon: Sid Amanda MD;  Location:  GI     DAVINCI HYSTERECTOMY TOTAL, BILATERAL SALPINGO-OOPHORECTOMY, COMBINED N/A 2019    Procedure: DaVinci Assisted Total Laparoscopic Hysterectomy, Removal Of Both Tubes And Ovaries;  Surgeon: Linh Cardoso MD;  Location:  OR     EXTRACAPSULAR CATARACT EXTRATION WITH INTRAOCULAR LENS IMPLANT  11-10-09, 2-9-10    11-10-09 Lt, 2-9-10 Rt; Left eye 2012     STENT, CORONARY, DELORES  2009    RCA       Family History   Problem Relation Age of Onset     Hypertension Mother      Cerebrovascular Disease Mother      Glaucoma Father      Cancer Father      Diabetes Sister      Glaucoma Sister      Cancer - colorectal Other      Cerebrovascular Disease Other      Skin Cancer No family hx of      Melanoma No family hx of      Anesthesia Reaction No family hx of      Deep Vein Thrombosis (DVT) No family hx of        Social History     Socioeconomic History     Marital status: Single     Spouse name: Not on file     Number of children: Not on file     Years of education: Not on file     Highest education level: Not on file   Occupational History     Not on file   Social Needs     Financial resource strain: Not on file     Food insecurity:     Worry: Not on file     Inability: Not on file     Transportation needs:     Medical: Not on file     Non-medical: Not on file   Tobacco Use     Smoking status: Former Smoker     Packs/day: 1.50     Years: 45.00     Pack years: 67.50     Types: Cigarettes     Start date: 1968     Last attempt to quit: 3/6/2013     Years since quittin.4     Smokeless tobacco: Never Used   Substance and Sexual Activity     Alcohol use: Not Currently     Drug use: Never     Sexual activity: Not Currently   Lifestyle     Physical activity:     Days per week: Not on file     Minutes per session: Not on file     Stress: Not on file   Relationships     Social connections:     Talks on phone: Not on file     Gets together:  Not on file     Attends [employer] service: Not on file     Active member of club or organization: Not on file     Attends meetings of clubs or organizations: Not on file     Relationship status: Not on file     Intimate partner violence:     Fear of current or ex partner: Not on file     Emotionally abused: Not on file     Physically abused: Not on file     Forced sexual activity: Not on file   Other Topics Concern     Parent/sibling w/ CABG, MI or angioplasty before 65F 55M? Not Asked   Social History Narrative    Pt has three daughters and two sons, single.  Her daughter Court, see's her often at the Nursing Home ([facility]).  Moved into Nursing Home in October 2011 after a hospitalization for ALY.  Moved from [place] in 2002 to [facility]. Has 5 grandchildren.       Physical Exam   There were no vitals taken for this visit.   GENERAL: healthy, alert and no distress  RESP: no audible wheeze, cough.  No visible retractions or increased work of breathing.  Able to speak fully in complete sentences.  PSYCH: mentation appears normal, affect normal/bright, judgement and insight intact, normal speech and appearance well-groomed    RESULTS  Lab Results   Component Value Date    A1C 6.6 (H) 07/23/2019    A1C 7.5 (H) 10/24/2016    A1C 7.5 (H) 10/12/2015    A1C 8.1 (H) 03/06/2014    A1C 7.2 (H) 03/07/2013    HEMOGLOBINA1 6.6 (A) 01/06/2020    HEMOGLOBINA1 6.5 (A) 05/24/2019    HEMOGLOBINA1 7.1 (A) 10/10/2018    HEMOGLOBINA1 7.1 (A) 03/16/2018    HEMOGLOBINA1 7.3 (A) 06/05/2017    HEMOGLOBINA1 7.3 (A) 06/05/2017       TSH   Date Value Ref Range Status   10/24/2016 1.20 0.40 - 4.00 mU/L Final   10/12/2015 1.18 0.40 - 4.00 mU/L Final   08/21/2014 1.24 0.40 - 4.00 mU/L Final     Comment:     Effective 7/30/2014, the reference range for this assay has changed to reflect   new instrumentation/methodology.     08/05/2013 1.12 0.4 - 5.0 mU/L Final   07/15/2010 0.53 0.4 - 5.0 mU/L Final     T4 Total   Date Value Ref Range  Status   10/30/2008 10.6 5.0 - 11.0 ug/dL Final     T4 Free   Date Value Ref Range Status   04/21/2006 1.04 0.70 - 1.85 ng/dL Final   09/23/2005 1.58 0.70 - 1.85 ng/dL Final       ALT   Date Value Ref Range Status   10/12/2015 39 0 - 50 U/L Final   03/06/2014 36 0 - 50 U/L Final   ]    Recent Labs   Lab Test 03/19/18 10/24/16  1301 10/12/15  1626 08/21/14  0935   CHOL 109  --   --  110   HDL  --   --   --  43*   LDL  --  48 51 43   TRIG  --   --   --  122   CHOLHDLRATIO  --   --   --  2.6       Lab Results   Component Value Date     08/06/2019      Lab Results   Component Value Date    POTASSIUM 4.8 08/06/2019     Lab Results   Component Value Date    CHLORIDE 104 08/06/2019     Lab Results   Component Value Date    OLAF 8.8 08/06/2019     Lab Results   Component Value Date    CO2 19 08/06/2019     Lab Results   Component Value Date    BUN 24 08/06/2019     Lab Results   Component Value Date    CR 0.98 08/06/2019       GFR Estimate   Date Value Ref Range Status   08/06/2019 59 (L) >60 mL/min/[1.73_m2] Final     Comment:     Non  GFR Calc  Starting 12/18/2018, serum creatinine based estimated GFR (eGFR) will be   calculated using the Chronic Kidney Disease Epidemiology Collaboration   (CKD-EPI) equation.     08/05/2019 50 (L) >60 mL/min/[1.73_m2] Final     Comment:     Non  GFR Calc  Starting 12/18/2018, serum creatinine based estimated GFR (eGFR) will be   calculated using the Chronic Kidney Disease Epidemiology Collaboration   (CKD-EPI) equation.     07/23/2019 48 (L) >60 mL/min/[1.73_m2] Final     Comment:     Non  GFR Calc  Starting 12/18/2018, serum creatinine based estimated GFR (eGFR) will be   calculated using the Chronic Kidney Disease Epidemiology Collaboration   (CKD-EPI) equation.       GFR Estimate If Black   Date Value Ref Range Status   08/06/2019 69 >60 mL/min/[1.73_m2] Final     Comment:      GFR Calc  Starting 12/18/2018, serum  creatinine based estimated GFR (eGFR) will be   calculated using the Chronic Kidney Disease Epidemiology Collaboration   (CKD-EPI) equation.     08/05/2019 58 (L) >60 mL/min/[1.73_m2] Final     Comment:      GFR Calc  Starting 12/18/2018, serum creatinine based estimated GFR (eGFR) will be   calculated using the Chronic Kidney Disease Epidemiology Collaboration   (CKD-EPI) equation.     07/23/2019 56 (L) >60 mL/min/[1.73_m2] Final     Comment:      GFR Calc  Starting 12/18/2018, serum creatinine based estimated GFR (eGFR) will be   calculated using the Chronic Kidney Disease Epidemiology Collaboration   (CKD-EPI) equation.         Lab Results   Component Value Date    MICROL <5 10/12/2015     No results found for: MICROALBUMIN  No results found for: CPEPT, GADAB, ISCAB    Vitamin B12   Date Value Ref Range Status   08/05/2013 506 >210 pg/mL Final     Comment:     Interp: 247-911 = Normal   ]    Most recent eye exam date: : Not Found     Care Everywhere Results:   A1c: 6.9% 3/18/20  TSH: 1.04 1/7/20  GFR 53  9/27/20  Creatinine: 1.22 9/27/20  LDL: 45 8/27/19  BMP: pending, drawn today        Assessment/Plan:      1.  Type 2 diabetes-  Ms. Garcias glucose is well controlled at present.  She does have microalbuminuria, so we discussed the role of SGLT-2 inhibitors with regard to improved renal and cardiovascular outcomes.  We discussed risks, side effects including thirst, frequent urination, dehydration, weight loss, yeast infection.  She is interested in starting Jardiance 10 mg daily. She was advised to drink more water.  I will also get in touch with Shawna Mesa CNP so they can carefully monitor her blood pressure.  I have advised her to reduce Toujeo to 72 units.  We may need to reduce further if she develops hypoglycemia (currently she is running a bit above target).  No other changes.  We will check in again in 1 month to assess her progress.      2.  Risk factors- BP is well  controlled.  She does have microalbuminuria (54.1 11/23/20).  She is on gabapentin and cymbalta for neuropathy. GFR >60 in 11/20. Will f/u with PCP to discuss ongoing symptoms.  TSH normal in January, 2020. LDL 45    3. F/U in 3 months with Dr. Herrera, in 6 months with me.       37 minutes spent on the date of the encounter doing chart review, review of test results, review and interpretation of glucose data, patient visit and documentation, counseling/coordination of care, and discussion of follow up plan for worsening hyper and hypoglycemia.  The patient understood and is satisfied with today's visit.     Renetta Washington PA-C, MPAS   [facility]  Department of Medicine  Division of Endocrinology and Diabetes

## 2021-05-10 NOTE — Clinical Note
Augustina Matos.   This is Renetta Washington.  I follow Cristal in the diabetes clinic.  I just wanted to let you know I started her on Jardiance today, as it is protective from a renal and cardiovascular standpoint.  I told her that this may lower her blood pressure, although it should be minimal.  I have encouraged her to stay well hydrated.  I am also cutting back her insulin. I just wanted you to be aware of the changes.  Thanks!  Renetta

## 2021-05-10 NOTE — PATIENT INSTRUCTIONS
Start Jardiance 10 mg every morning.      Reduce Toujeo to 72 units once you start Jardiance.     Drink plenty of water.  This medicine can make you dehydrated.      Please check blood pressure.  This medicine can lower your blood pressure.     Next visit:   June 14th at 11AM.

## 2021-05-21 ENCOUNTER — COMMUNICATION - HEALTHEAST (OUTPATIENT)
Dept: GERIATRICS | Facility: CLINIC | Age: 71
End: 2021-05-21

## 2021-05-21 DIAGNOSIS — Z79.4 TYPE 2 DIABETES MELLITUS WITH OTHER SPECIFIED COMPLICATION, WITH LONG-TERM CURRENT USE OF INSULIN (H): ICD-10-CM

## 2021-05-21 DIAGNOSIS — E11.69 TYPE 2 DIABETES MELLITUS WITH OTHER SPECIFIED COMPLICATION, WITH LONG-TERM CURRENT USE OF INSULIN (H): ICD-10-CM

## 2021-05-26 NOTE — TELEPHONE ENCOUNTER
[facility] Nurse Triage Telephone Note      Provider: DANYELLE Wesley  Facility: [facility] Type: LTC    Caller: Tere  Call Back Number:  [phone]    Allergies: Nuts - unspecified and Unable to assess    Reason for call: Nurse reporting UC results.  UA/UC was done due to c/o burning, but patient hasn't complained of burning since, nor is running a fever.       Verbal Order/Direction given by Provider: No new orders.      Provider giving order: DANYELLE Wesley    Verbal order given to: Tere Vora RN

## 2021-05-27 VITALS
DIASTOLIC BLOOD PRESSURE: 60 MMHG | OXYGEN SATURATION: 98 % | TEMPERATURE: 98.3 F | HEART RATE: 76 BPM | SYSTOLIC BLOOD PRESSURE: 128 MMHG | WEIGHT: 177.5 LBS | BODY MASS INDEX: 31.44 KG/M2 | RESPIRATION RATE: 18 BRPM

## 2021-05-27 NOTE — PROGRESS NOTES
"[facility]    Facility:   [facility] [[id number]]   Code Status: FULL CODE       Chief Complaint   Patient presents with     Review Of Multiple Medical Conditions     [facility] 3/28/19.     3  HPI:   Cristal Preciado is a 69-year-old female seen for routine physician follow-up in the long-term care facility [facility]. She has been a resident here since October 2011. She does have multiple complex co morbidities. She is treated for hypertension and has insulin-dependent diabetes mellitus. She sees an endocrinologist. She has chronic neuropathy, chronic kidney disease and is treated with Cymbalta for depression and chronic pain. She is on gabapentin and lyrica for neuropathy pain. She is on meds for depression. She has chronic weakness in her lower extremities and is wheelchair bound. She has chronic urinary incontinence. Most recent hospitalization was April 2018 for chest pain. It is noted she has coronary artery disease having had PCI with stent placement in 2009. Her chest pain was reproducible on exam. Negative 3 sets of troponin. EKG showed no significant ischemic changes. Pharmacological stress was negative for inducible ischemia so no intervention was required. She has periodic follow up with cardiology.     Today:  She sees outside podiatry on an ongoing basis and recently was felt to have pressure blister of right heel. There was never an open area and never a raised blister per nursing. She floats her heels and rarely wears shoes, she is non ambulatory. Today she went for follow up and nail trim and the referral form came back with \"right heel healed pressure blister with residual hyperkeratotic skin\". Per nursling it looks the same as it has, still with purple area under the skin, no pain and no other concerns. It is doubtful of pressure etiology but will continue to off load for good practice and prevention. Otherwise, she will be seeing " endocrinologist for her diabetes. She is managed with insulin. She has chronic pain. Today said her shoulders are not bothering her as they had been previously. She has not been ill recently with cough cold or congestion. Pharmacy recently made a recommendation to reduce gabapentin as she is on both lyrica and gabapentin with reduced creatinine clearance. Earlier this week I reduced gabapentin from 900 three times a day to 600 three times a day. Will reassess pain needs as needed. She is also on Lyrica 75 two times a day.      Past Medical History:  Past Medical History:   Diagnosis Date     Allergic rhinitis      CAD (coronary artery disease)      Carpal tunnel syndrome      Cataract      Cerebral vascular accident (H)      Chronic kidney disease      Debility      Depression      Diabetes mellitus, type II (H)      Diabetic nephropathy (H)      Diabetic neuropathy (H)      GERD (gastroesophageal reflux disease)      Glaucoma      Gout      History of pyelonephritis      HTN (hypertension)      Hyperlipidemia      IDDM (insulin dependent diabetes mellitus) (H)     Type 2     Lower extremity edema      Myocardial infarction        Medications:  Current Outpatient Medications   Medication Sig     acetaminophen (TYLENOL) 500 MG tablet Take 1,000 mg by mouth 3 (three) times a day .           aspirin 81 mg chewable tablet Chew 81 mg daily.     atorvastatin (LIPITOR) 80 MG tablet Take 80 mg by mouth bedtime.      calcium carbonate-vit D3-min 600 mg calcium- 400 unit Tab Take 1 tablet by mouth 2 (two) times a day.      chlorthalidone (HYGROTEN) 25 MG tablet Take 25 mg by mouth daily.      dorzolamide-timolol (COSOPT) 22.3-6.8 mg/mL ophthalmic solution Administer 1 drop to both eyes 2 (two) times a day.      DULoxetine (CYMBALTA) 60 MG capsule Take 60 mg by mouth daily.     ferrous sulfate 325 (65 FE) MG tablet Take 1 tablet by mouth daily with breakfast.     folic acid (FOLVITE) 1 MG tablet Take 1 mg by mouth daily.      gabapentin (NEURONTIN) 300 MG capsule Take 600 mg by mouth 3 (three) times a day.            insulin regular hum U-500 conc, HumuLIN R, (HUMULIN R CONC) 500 unit/mL CONCENTRATED injection Inject 60 Units under the skin 3 (three) times a day Call if readings <70 or >350 .     ketoconazole (NIZORAL) 2 % shampoo Apply 1 application topically 2 (two) times a week Apply to damp skin, lather, leave on 5 minutes, and rinse. Apply on Wednesdays and Saturdays..           latanoprost (XALATAN) 0.005 % ophthalmic solution Administer 1 drop to both eyes bedtime.      liraglutide (VICTOZA) 0.6 mg/0.1 mL (18 mg/3 mL) PnIj injection Inject 1.8 mg under the skin daily. Call [phone] Dr. Washington if she develops nausea lasting >3 days.     lisinopril (PRINIVIL,ZESTRIL) 40 MG tablet Take 40 mg by mouth daily. Hold for SBP <110     loratadine (CLARITIN) 10 mg tablet Take 10 mg by mouth daily as needed for allergies.     LYRICA 75 mg capsule TAKE 1 CAP BY MOUTH THREE TIMES DAILY     metFORMIN (GLUCOPHAGE-XR) 500 MG 24 hr tablet Take 1,000 mg by mouth daily with breakfast.     metoprolol tartrate (LOPRESSOR) 100 MG tablet Take 100 mg by mouth 2 (two) times a day.     mineral oil Drop Administer 3 drops into both ears daily as needed Every day shift, apply on Q-tip and rub into ears. .           nitroglycerin (NITROSTAT) 0.4 MG SL tablet Place 0.4 mg under the tongue every 5 (five) minutes as needed for chest pain. 1 tablet SL Q 5 minutes up to 3 doses. Call [phone] if chest pain persists.     olopatadine 0.2 % Drop Apply 1 drop to eye daily as needed.     ranitidine (ZANTAC) 150 MG tablet Take 150 mg by mouth once daily.      senna-docusate (SENNOSIDES-DOCUSATE SODIUM) 8.6-50 mg tablet Take 1 tablet by mouth daily as needed for constipation.     traMADol (ULTRAM) 50 mg tablet Take 1 tablet (50 mg total) by mouth 3 (three) times a day as needed for pain. For pain 6 or higher     triamcinolone (KENALOG) 0.1 % ointment Apply 1 application  topically daily. Apply to legs in the morning  And to right ear prn BID       Physical Exam:   General: Patient is alert female, no distress.   Vitals: /78, 119/62, Temp 98.3, Pulse 88., RR 18,O2 sat 94% RA.  HEENT: Head is NCAT. Eyes show no injection or icterus. Nares negative. Oropharynx well hydrated.  Neck: Supple. No tenderness or adenopathy. No JVD.  Lungs: Clear bilaterally. No wheezes.  Cardiovascular: Regular rate and rhythm, normal S1, S2.  Back: No spinal or CVA tenderness.  Abdomen: Soft, no tenderness on exam. Bowel sounds present. No guarding rebound or rigidity.  Extremities: LE edema is noted.  Skin: Small flat purple area right medial posterior heel, no redness or drainage.   Musculoskeletal: Degen changes.  Psych: Mood appears good.      Labs:  Lab Results   Component Value Date    WBC 4.3 04/24/2018    HGB 9.4 (L) 04/24/2018    HCT 28.6 (L) 04/24/2018     (H) 04/24/2018     04/26/2018     Results for orders placed or performed in visit on 01/10/19   Basic Metabolic Panel   Result Value Ref Range    Sodium 138 136 - 145 mmol/L    Potassium 4.6 3.5 - 5.0 mmol/L    Chloride 108 (H) 98 - 107 mmol/L    CO2 21 (L) 22 - 31 mmol/L    Anion Gap, Calculation 9 5 - 18 mmol/L    Glucose 136 (H) 70 - 125 mg/dL    Calcium 9.6 8.5 - 10.5 mg/dL    BUN 32 (H) 8 - 22 mg/dL    Creatinine 1.46 (H) 0.60 - 1.10 mg/dL    GFR MDRD Af Amer 43 (L) >60 mL/min/1.73m2    GFR MDRD Non Af Amer 36 (L) >60 mL/min/1.73m2       Assessment/Plan:  1. IDDM. Diabetes. Continue to follow accuchecks. She will see endocrinology in April.  2. HTN. BPs overall acceptable, On lisinopril, metoprolol, chlorthalidone.    3. Neuropathy. Chronic pain controlled with gabapentin (dose recently reduced due to impaired kidney function per pharmacy), Cymbalta.  4. Hx of stroke. Wheelchair bound, non ambulatory.  5. Right heel skin issue. Unknown etiology. Stable. Sees podiatry, continue current cares and attention to pressure  avoidance.  6. Glaucoma. Visual impairment at baseline.          Electronically signed by: Meagan Valdez MD

## 2021-05-28 NOTE — PROGRESS NOTES
[facility]    Facility:   [facility] [[id number]]   Code Status: FULL CODE      CHIEF COMPLAINT/REASON FOR VISIT:  Chief Complaint   Patient presents with     Review Of Multiple Medical Conditions       HISTORY:      HPI: Cristal is a 69 y.o. female  being seen for routine visit in the long-term care facility [facility]. She has been a resident here since October 2011. She does have multiple complex co morbidities. She is treated for hypertension and has insulin-dependent diabetes mellitus. She has a endocrinologist who is following her blood sugars. Nursing is sending weekly checks to her endocrinologist.    She has chronic neuropathy, chronic kidney disease and is treated with Cymbalta for depression. She is on gabapentin and lyrica for neuropathy pain. She is on meds for depression.  Does require a lift for transfers. She has chronic weakness in her lower extremities.     Today she is seen in her room for a routine medical visit to review multiple medical issues.  She denies chest pain or shortness of breath.  She denies cough or congestion.   She is  non ambulatory, has impaired mobility of both hands but does report relief with gabapentin and lyrica.  Today she reports an itchy air and thinks she has wax build up. Her ears were examined and she did have  a moderate  Amount of cerumen in her right ear and left ear was minimal. Her BS appear to be more controlled and she is followed by endocrinology. Last A1c 7.0 1/10/19  which is down from 8.3.    Past Medical History:   Diagnosis Date     Allergic rhinitis      CAD (coronary artery disease)      Carpal tunnel syndrome      Cataract      Cerebral vascular accident (H)      Chronic kidney disease      Debility      Depression      Diabetes mellitus, type II (H)      Diabetic nephropathy (H)      Diabetic neuropathy (H)      GERD (gastroesophageal reflux disease)      Glaucoma      Gout      History of  pyelonephritis      HTN (hypertension)      Hyperlipidemia      IDDM (insulin dependent diabetes mellitus) (H)     Type 2     Lower extremity edema      Myocardial infarction              Family History   Problem Relation Age of Onset     Hypertension Mother      Stroke Mother      Cancer Father      Arthritis Brother      Social History     Socioeconomic History     Marital status: Single     Spouse name: Not on file     Number of children: Not on file     Years of education: Not on file     Highest education level: Not on file   Occupational History     Not on file   Social Needs     Financial resource strain: Not on file     Food insecurity:     Worry: Not on file     Inability: Not on file     Transportation needs:     Medical: Not on file     Non-medical: Not on file   Tobacco Use     Smoking status: Former Smoker   Substance and Sexual Activity     Alcohol use: No     Drug use: No     Sexual activity: Not on file   Lifestyle     Physical activity:     Days per week: Not on file     Minutes per session: Not on file     Stress: Not on file   Relationships     Social connections:     Talks on phone: Not on file     Gets together: Not on file     Attends Sikh service: Not on file     Active member of club or organization: Not on file     Attends meetings of clubs or organizations: Not on file     Relationship status: Not on file     Intimate partner violence:     Fear of current or ex partner: Not on file     Emotionally abused: Not on file     Physically abused: Not on file     Forced sexual activity: Not on file   Other Topics Concern     Not on file   Social History Narrative    10/13/2015 - The patient lives at [facility] for 5 years.         Review of Systems   Constitutional: Positive for activity change. Negative for appetite change, chills, fatigue and fever.        Lift for transfers.    HENT: Negative for congestion and sore throat.    Respiratory: Negative for shortness of breath and wheezing.     Cardiovascular: Positive for leg swelling. Negative for chest pain.        Compression stockings   Gastrointestinal: Negative for abdominal distention, abdominal pain, constipation, diarrhea and nausea.   Genitourinary: Negative for dysuria.   Musculoskeletal: Positive for arthralgias. Negative for myalgias.   Skin: Negative for color change, rash and wound.   Neurological: Positive for numbness. Negative for dizziness and weakness.        Severe neuropathy bilateral hands   Psychiatric/Behavioral: Negative for agitation, behavioral problems and sleep disturbance.       .  Vitals:    05/08/19 0834   BP: 149/47   Pulse: 72   Resp: 20   Temp: 97.2  F (36.2  C)   SpO2: 94%   Weight: (!) 225 lb 11.2 oz (102.4 kg)       Physical Exam   Constitutional: She is oriented to person, place, and time. She appears well-developed and well-nourished.   Pleasant woman in no acute distress.   HENT:   Head: Normocephalic.   Cerumen right ear   Eyes: Conjunctivae are normal.   Neck: Normal range of motion.   Cardiovascular: Normal rate, regular rhythm and normal heart sounds.   No murmur heard.  Pulmonary/Chest: Breath sounds normal. No respiratory distress. She has no wheezes. She has no rales.   Abdominal: Soft. Bowel sounds are normal. She exhibits no distension. There is no tenderness.   Musculoskeletal: She exhibits edema.   Wearing Compression socks   Neurological: She is alert and oriented to person, place, and time.   Neuropathy bilateral hands.   Skin: Skin is warm.   Psychiatric: She has a normal mood and affect. Her behavior is normal.         LABS:    [facility] 1/10/19 7.0  No results found for this or any previous visit (from the past 240 hour(s)).[facility] 9/7/18 8.3      Current Outpatient Medications   Medication Sig     acetaminophen (TYLENOL) 500 MG tablet Take 1,000 mg by mouth 3 (three) times a day .           aspirin 81 mg chewable tablet Chew 81 mg daily.     atorvastatin (LIPITOR) 80 MG tablet Take 80 mg by mouth bedtime.       calcium carbonate-vit D3-min 600 mg calcium- 400 unit Tab Take 1 tablet by mouth 2 (two) times a day.      chlorthalidone (HYGROTEN) 25 MG tablet Take 25 mg by mouth daily.      dorzolamide-timolol (COSOPT) 22.3-6.8 mg/mL ophthalmic solution Administer 1 drop to both eyes 2 (two) times a day.      DULoxetine (CYMBALTA) 60 MG capsule Take 60 mg by mouth daily.     ferrous sulfate 325 (65 FE) MG tablet Take 1 tablet by mouth daily with breakfast.     folic acid (FOLVITE) 1 MG tablet Take 1 mg by mouth daily.     gabapentin (NEURONTIN) 300 MG capsule Take 600 mg by mouth 3 (three) times a day.            insulin regular hum U-500 conc, HumuLIN R, (HUMULIN R CONC) 500 unit/mL CONCENTRATED injection Inject 60 Units under the skin 3 (three) times a day Call if readings <70 or >350 .     ketoconazole (NIZORAL) 2 % shampoo Apply 1 application topically 2 (two) times a week Apply to damp skin, lather, leave on 5 minutes, and rinse. Apply on Wednesdays and Saturdays..           latanoprost (XALATAN) 0.005 % ophthalmic solution Administer 1 drop to both eyes bedtime.      liraglutide (VICTOZA) 0.6 mg/0.1 mL (18 mg/3 mL) PnIj injection Inject 1.8 mg under the skin daily. Call [phone] Dr. Washington if she develops nausea lasting >3 days.     lisinopril (PRINIVIL,ZESTRIL) 40 MG tablet Take 40 mg by mouth daily. Hold for SBP <110     loratadine (CLARITIN) 10 mg tablet Take 10 mg by mouth daily as needed for allergies.     LYRICA 75 mg capsule TAKE 1 CAP BY MOUTH THREE TIMES DAILY     metFORMIN (GLUCOPHAGE-XR) 500 MG 24 hr tablet Take 1,000 mg by mouth daily with breakfast.     metoprolol tartrate (LOPRESSOR) 100 MG tablet Take 100 mg by mouth 2 (two) times a day.     mineral oil Drop Administer 3 drops into both ears daily as needed Every day shift, apply on Q-tip and rub into ears. .           nitroglycerin (NITROSTAT) 0.4 MG SL tablet Place 0.4 mg under the tongue every 5 (five) minutes as needed for chest pain. 1 tablet  SL Q 5 minutes up to 3 doses. Call 911 if chest pain persists.     olopatadine 0.2 % Drop Apply 1 drop to eye daily as needed.     ranitidine (ZANTAC) 150 MG tablet Take 150 mg by mouth once daily.      senna-docusate (SENNOSIDES-DOCUSATE SODIUM) 8.6-50 mg tablet Take 1 tablet by mouth daily as needed for constipation.     traMADol (ULTRAM) 50 mg tablet Take 1 tablet (50 mg total) by mouth 3 (three) times a day as needed for pain. For pain 6 or higher     triamcinolone (KENALOG) 0.1 % ointment Apply 1 application topically daily. Apply to legs in the morning  And to right ear prn BID       ASSESSMENT:      ICD-10-CM    1. IDDM (insulin dependent diabetes mellitus) (H) E11.9     Z79.4    2. Essential hypertension I10    3. Other chronic pain G89.29    4. Impacted cerumen of right ear H61.21        PLAN:   IDDM. FS are stable last A1C 7.0 on 1/10/19 Endocrinologist following ,    HTN. Continue  Lisinpril and metoprolol  Neuropathy.mainly  hands with reports of feet also . On lyrica and gabapentin.  CAD. HX  MI and stent.   Right ear cerumen - ear irrigation per orders-          Electronically signed by: Shawna Mesa CNP

## 2021-05-29 ENCOUNTER — RECORDS - HEALTHEAST (OUTPATIENT)
Dept: ADMINISTRATIVE | Facility: CLINIC | Age: 71
End: 2021-05-29

## 2021-05-30 VITALS — BODY MASS INDEX: 41.46 KG/M2 | WEIGHT: 234 LBS | HEIGHT: 63 IN

## 2021-05-30 VITALS — HEIGHT: 63 IN | BODY MASS INDEX: 41.29 KG/M2 | WEIGHT: 233 LBS

## 2021-05-30 VITALS — WEIGHT: 232.8 LBS | HEIGHT: 63 IN | BODY MASS INDEX: 41.25 KG/M2

## 2021-05-30 NOTE — PROGRESS NOTES
[facility]    Facility:   [facility] [[id number]]   Code Status: FULL CODE      CHIEF COMPLAINT/REASON FOR VISIT:  Chief Complaint   Patient presents with     Review Of Multiple Medical Conditions       HISTORY:      HPI: Cristal is a 69 y.o. female  being seen for routine visit in the long-term care facility [facility]. She has been a resident here since October 2011. She does have multiple complex co morbidities. She is treated for hypertension and has insulin-dependent diabetes mellitus. She has a endocrinologist who is following her blood sugars. Nursing is sending weekly checks to her endocrinologist.    She has chronic neuropathy, chronic kidney disease and is treated with Cymbalta for depression. She is on gabapentin and lyrica for neuropathy pain. She is on meds for depression.  Does require a lift for transfers. She has chronic weakness in her lower extremities.     Today she is seen in her room for a routine medical visit to review multiple medical issues.  She denies chest pain or shortness of breath.  She denies cough or congestion.   She is  non ambulatory, She is on  gabapentin and lyrica for neuropathy.. Her BS appear to be more controlled and she is followed by endocrinology. Last A1c 7.0 1/10/19  which is down from 8.3. She is scheduled for a possible hysterectomy next month and will see Cardiology on Monday for clearance. She does report intermittent right chest wall pain and tells me her cardiologist reported it as due to stress. She tells me she had a workup done. She denied it today. She reports sometimes the pain lasts all day and other times it is short.     Past Medical History:   Diagnosis Date     Allergic rhinitis      CAD (coronary artery disease)      Carpal tunnel syndrome      Cataract      Cerebral vascular accident (H)      Chronic kidney disease      Debility      Depression      Diabetes mellitus, type II (H)      Diabetic  nephropathy (H)      Diabetic neuropathy (H)      GERD (gastroesophageal reflux disease)      Glaucoma      Gout      History of pyelonephritis      HTN (hypertension)      Hyperlipidemia      IDDM (insulin dependent diabetes mellitus) (H)     Type 2     Lower extremity edema      Myocardial infarction              Family History   Problem Relation Age of Onset     Hypertension Mother      Stroke Mother      Cancer Father      Arthritis Brother      Social History     Socioeconomic History     Marital status: Single     Spouse name: Not on file     Number of children: Not on file     Years of education: Not on file     Highest education level: Not on file   Occupational History     Not on file   Social Needs     Financial resource strain: Not on file     Food insecurity:     Worry: Not on file     Inability: Not on file     Transportation needs:     Medical: Not on file     Non-medical: Not on file   Tobacco Use     Smoking status: Former Smoker   Substance and Sexual Activity     Alcohol use: No     Drug use: No     Sexual activity: Not on file   Lifestyle     Physical activity:     Days per week: Not on file     Minutes per session: Not on file     Stress: Not on file   Relationships     Social connections:     Talks on phone: Not on file     Gets together: Not on file     Attends Gnosticism service: Not on file     Active member of club or organization: Not on file     Attends meetings of clubs or organizations: Not on file     Relationship status: Not on file     Intimate partner violence:     Fear of current or ex partner: Not on file     Emotionally abused: Not on file     Physically abused: Not on file     Forced sexual activity: Not on file   Other Topics Concern     Not on file   Social History Narrative    10/13/2015 - The patient lives at [facility] for 5 years.         Review of Systems   Constitutional: Positive for activity change. Negative for appetite change, chills, fatigue and fever.        Lift  for transfers.    HENT: Negative for congestion and sore throat.    Respiratory: Negative for shortness of breath and wheezing.    Cardiovascular: Positive for leg swelling. Negative for chest pain.        Compression stockings   Gastrointestinal: Negative for abdominal distention, abdominal pain, constipation, diarrhea and nausea.   Genitourinary: Negative for dysuria.   Musculoskeletal: Positive for arthralgias. Negative for myalgias.   Skin: Negative for color change, rash and wound.   Neurological: Positive for numbness. Negative for dizziness and weakness.        Severe neuropathy bilateral hands   Psychiatric/Behavioral: Negative for agitation, behavioral problems and sleep disturbance.       .  Vitals:    07/11/19 0504   BP: 129/63   Pulse: 80   Resp: 20   Temp: 98  F (36.7  C)   SpO2: 98%   Weight: (!) 229 lb (103.9 kg)       Physical Exam   Constitutional: She is oriented to person, place, and time. She appears well-developed and well-nourished.   Pleasant woman in no acute distress.   HENT:   Head: Normocephalic.   Eyes: Conjunctivae are normal.   Neck: Normal range of motion.   Cardiovascular: Normal rate, regular rhythm and normal heart sounds.   No murmur heard.  Pulmonary/Chest: Breath sounds normal. No respiratory distress. She has no wheezes. She has no rales.   Abdominal: Soft. Bowel sounds are normal. She exhibits no distension. There is no tenderness.   Musculoskeletal: She exhibits edema.   Wearing Compression socks   Neurological: She is alert and oriented to person, place, and time.   Neuropathy bilateral hands.   Skin: Skin is warm.   Psychiatric: She has a normal mood and affect. Her behavior is normal.         LABS:    AIC 1/10/19 7.0  No results found for this or any previous visit (from the past 240 hour(s)).Aic 9/7/18 8.3      Current Outpatient Medications   Medication Sig     acetaminophen (TYLENOL) 500 MG tablet Take 1,000 mg by mouth 3 (three) times a day .           aspirin 81 mg  chewable tablet Chew 81 mg daily.     atorvastatin (LIPITOR) 80 MG tablet Take 80 mg by mouth bedtime.      calcium carbonate-vit D3-min 600 mg calcium- 400 unit Tab Take 1 tablet by mouth 2 (two) times a day.      chlorthalidone (HYGROTEN) 25 MG tablet Take 25 mg by mouth daily.      dorzolamide-timolol (COSOPT) 22.3-6.8 mg/mL ophthalmic solution Administer 1 drop to both eyes 2 (two) times a day.      DULoxetine (CYMBALTA) 60 MG capsule Take 60 mg by mouth daily.     ferrous sulfate 325 (65 FE) MG tablet Take 1 tablet by mouth daily with breakfast.     folic acid (FOLVITE) 1 MG tablet Take 1 mg by mouth daily.     gabapentin (NEURONTIN) 300 MG capsule Take 600 mg by mouth 3 (three) times a day.            insulin regular hum U-500 conc, HumuLIN R, (HUMULIN R CONC) 500 unit/mL CONCENTRATED injection Inject 60 Units under the skin 3 (three) times a day Call if readings <70 or >350 .     ketoconazole (NIZORAL) 2 % shampoo Apply 1 application topically 2 (two) times a week Apply to damp skin, lather, leave on 5 minutes, and rinse. Apply on Wednesdays and Saturdays..           latanoprost (XALATAN) 0.005 % ophthalmic solution Administer 1 drop to both eyes bedtime.      liraglutide (VICTOZA) 0.6 mg/0.1 mL (18 mg/3 mL) PnIj injection Inject 1.8 mg under the skin daily. Call [phone] Dr. Washington if she develops nausea lasting >3 days.     lisinopril (PRINIVIL,ZESTRIL) 40 MG tablet Take 40 mg by mouth daily. Hold for SBP <110     loratadine (CLARITIN) 10 mg tablet Take 10 mg by mouth daily as needed for allergies.     LYRICA 75 mg capsule TAKE 1 CAP BY MOUTH THREE TIMES DAILY     metFORMIN (GLUCOPHAGE-XR) 500 MG 24 hr tablet Take 1,000 mg by mouth daily with breakfast.     metoprolol tartrate (LOPRESSOR) 100 MG tablet Take 100 mg by mouth 2 (two) times a day.     mineral oil Drop Administer 3 drops into both ears daily as needed Every day shift, apply on Q-tip and rub into ears. .           nitroglycerin (NITROSTAT)  0.4 MG SL tablet Place 0.4 mg under the tongue every 5 (five) minutes as needed for chest pain. 1 tablet SL Q 5 minutes up to 3 doses. Call 911 if chest pain persists.     olopatadine 0.2 % Drop Apply 1 drop to eye daily as needed.     ranitidine (ZANTAC) 150 MG tablet Take 150 mg by mouth once daily.      senna-docusate (SENNOSIDES-DOCUSATE SODIUM) 8.6-50 mg tablet Take 1 tablet by mouth daily as needed for constipation.     traMADol (ULTRAM) 50 mg tablet Take 1 tablet (50 mg total) by mouth 3 (three) times a day as needed for pain. For pain 6 or higher     triamcinolone (KENALOG) 0.1 % ointment Apply 1 application topically daily. Apply to legs in the morning  And to right ear prn BID       ASSESSMENT:      ICD-10-CM    1. IDDM (insulin dependent diabetes mellitus) (H) E11.9     Z79.4    2. Essential hypertension I10    3. Debility R53.81    4. Other chronic pain G89.29        PLAN:   IDDM. FS are stable last A1C 7.0 on 1/10/19 Endocrinologist following ,    HTN. Continue  Lisinpril and metoprolol  Neuropathy.mainly  hands with reports of feet also . On lyrica and gabapentin.  CAD. HX  MI and stent.   Hysterectomy  pending for next month pending cardiology consult           Electronically signed by: Shawna Mesa CNP

## 2021-05-31 ENCOUNTER — RECORDS - HEALTHEAST (OUTPATIENT)
Dept: ADMINISTRATIVE | Facility: CLINIC | Age: 71
End: 2021-05-31

## 2021-05-31 VITALS — HEIGHT: 63 IN | BODY MASS INDEX: 42.84 KG/M2 | WEIGHT: 241.8 LBS

## 2021-05-31 VITALS — HEIGHT: 63 IN | BODY MASS INDEX: 40.13 KG/M2 | WEIGHT: 226.5 LBS

## 2021-05-31 VITALS — BODY MASS INDEX: 41.75 KG/M2 | WEIGHT: 235.6 LBS | HEIGHT: 63 IN

## 2021-05-31 VITALS — HEIGHT: 63 IN | BODY MASS INDEX: 41.57 KG/M2 | WEIGHT: 234.6 LBS

## 2021-05-31 VITALS — BODY MASS INDEX: 42.51 KG/M2 | WEIGHT: 240 LBS

## 2021-05-31 NOTE — TELEPHONE ENCOUNTER
[facility] Nurse Triage Telephone Note      Provider: DANYELLE Wesley  Facility: [facility] Type: LTC    Caller: Tere  Call Back Number:  [phone]    Allergies: Nuts - unspecified and Unable to assess    Reason for call: Suppertime BG only 55 she is A & O & feels fine. She is due for U500 60units. Yesterday suppertime BG was also low waited til after she ate & then BG was 107 & had her insulin.  At HS yesterday BG 79 then after HS snack was 100.  This am , lunch .  Gets U500 50units breakfast & lunch, with 60units supper.     Verbal Order/Direction given by Provider: Hold insulin til make sure she eats, then only give U 500 30units tonight.    Provider giving order: DANYELLE Wesley    Verbal order given to: Tere Reis RN

## 2021-05-31 NOTE — TELEPHONE ENCOUNTER
[facility] Nurse Triage Telephone Note      Provider: DANYELLE Wesley  Facility: [facility] Type: LTC    Caller: Tere  Call Back Number:  [phone]    Allergies: Nuts - unspecified and Unable to assess    Reason for call: Pt follows Endocrine for her Diabetes.  Earlier in week we faxed& called over BG's & no callback. Called again this am & I guess missed the callback-but had the portable with me, attempted call back back they were closed.  Suppertime BG's 62, 55, 110,102,150,98,73,165,70,85,153,86.  We & pt worried about it so low we give extra snack.  HS BG's: 100, 157, 119, 76, 85, 126, 102, 95, 78, 152, 149,76.    She gets  Humulin R- U500  50units Breakfast & 50u Lunch,  60units at Supper.    Verbal Order/Direction given by Provider: Decrease to Lunch 40units, Supper 50units. Update Endocrinologist on Monday of changes made & update on BG's. Last AiC 7/23 6.6.    Provider giving order: DANYELLE Wesley    Verbal order given to: Tere Reis RN

## 2021-05-31 NOTE — TELEPHONE ENCOUNTER
[facility] Nurse Triage Telephone Note      Provider: DANYELLE Wesley  Facility: [facility] Type: LTC    Caller: Tere  Call Back Number:  [phone]    Allergies: Nuts - unspecified and Unable to assess    Reason for call: Nurse calling to report that patient's BG prior to dinner is 62.  Asymptomatic.  Patient was given juice and is going now to eat dinner.  She's supposed to receive U-500 insulin 60 units with meals.  Normally at this time, BG's run between .      Verbal Order/Direction given by Provider: Have patient eat dinner, then wait 30 minutes.  If after 30 minutes BG is <100, call.  Otherwise, give insulin and update patient's endocrinologist in AM.      Provider giving order: DANYELLE Wesley    Verbal order given to: Tere Vora RN

## 2021-06-01 ENCOUNTER — RECORDS - HEALTHEAST (OUTPATIENT)
Dept: ADMINISTRATIVE | Facility: CLINIC | Age: 71
End: 2021-06-01

## 2021-06-01 ENCOUNTER — COMMUNICATION - HEALTHEAST (OUTPATIENT)
Dept: GERIATRICS | Facility: CLINIC | Age: 71
End: 2021-06-01

## 2021-06-01 VITALS — BODY MASS INDEX: 42.25 KG/M2 | WEIGHT: 238.5 LBS

## 2021-06-01 VITALS — WEIGHT: 241.5 LBS | BODY MASS INDEX: 42.78 KG/M2

## 2021-06-01 VITALS — WEIGHT: 239 LBS | BODY MASS INDEX: 42.34 KG/M2

## 2021-06-01 NOTE — PROGRESS NOTES
[facility]    Facility:   [facility] [[id number]]   Code Status: FULL CODE      CHIEF COMPLAINT/REASON FOR VISIT:  Chief Complaint   Patient presents with     P Care Coordination - Regulatory       HISTORY:      HPI: Cristal is a 69 y.o. female residing  in the long-term care facility [facility]. She has been a resident here since October 2011. She does have multiple complex co morbidities. She is treated for hypertension and has insulin-dependent diabetes mellitus. She has a endocrinologist who is following her blood sugars. Nursing is sending weekly checks to her endocrinologist.  She has chronic neuropathy, chronic kidney disease and is treated with Cymbalta for depression. She is on gabapentin and lyrica for neuropathy pain. She is on meds for depression.  Does require a lift for transfers. She has chronic weakness in her lower extremities.      Today she is seen in her room for reports of abdominal pain x 1 week following a recent laparoscopic hysterectomy.  She reports she has been having right lower abdominal pain since her surgery for approximately 1 week.  She reports she is passing lots of flatus and having regular bowel movements.  Her abdomen was more distended than normal and she reports the pain radiates up to underneath her right shoulder and low back.  She did have some discomfort with palpation to the right lower abdomen.  Her lap sites  are all clean dry and intact with no signs or symptoms of infection.  It appears some of her pain may be related to gas pain and she was placed on simethicone 4 times daily.  I will also check an abdominal x-ray. along with labs and a U/A.  She also reported she has been having pain in both her calves and a venous ultrasound was ordered.  She denies chest pain or shortness of breath.  She denies cough or congestion.   She is  non ambulatory, She is on  gabapentin and lyrica for neuropathy.. Her BS appear  to be more controlled and she is followed by endocrinology.      Past Medical History:   Diagnosis Date     Acute cystitis with hematuria      Allergic rhinitis      CAD (coronary artery disease)      Carpal tunnel syndrome      Cataract      Cerebral vascular accident (H)      Chronic kidney disease      Debility      Depression      Diabetes mellitus, type II (H)      Diabetic nephropathy (H)      GERD (gastroesophageal reflux disease)      Glaucoma      Gout      History of pyelonephritis      HTN (hypertension)      Hyperlipidemia      IDDM (insulin dependent diabetes mellitus) (H)     Type 2     Lower extremity edema      Myocardial infarction (H)              Family History   Problem Relation Age of Onset     Hypertension Mother      Stroke Mother      Cancer Father      Glaucoma Father      Arthritis Brother      Diabetes Sister      Glaucoma Sister      Social History     Socioeconomic History     Marital status: Single     Spouse name: Not on file     Number of children: Not on file     Years of education: Not on file     Highest education level: Not on file   Occupational History     Not on file   Social Needs     Financial resource strain: Not on file     Food insecurity:     Worry: Not on file     Inability: Not on file     Transportation needs:     Medical: Not on file     Non-medical: Not on file   Tobacco Use     Smoking status: Former Smoker     Smokeless tobacco: Never Used   Substance and Sexual Activity     Alcohol use: No     Drug use: No     Sexual activity: Not on file   Lifestyle     Physical activity:     Days per week: Not on file     Minutes per session: Not on file     Stress: Not on file   Relationships     Social connections:     Talks on phone: Not on file     Gets together: Not on file     Attends Mosque service: Not on file     Active member of club or organization: Not on file     Attends meetings of clubs or organizations: Not on file     Relationship status: Not on file      Intimate partner violence:     Fear of current or ex partner: Not on file     Emotionally abused: Not on file     Physically abused: Not on file     Forced sexual activity: Not on file   Other Topics Concern     Not on file   Social History Narrative    10/13/2015 - The patient lives at [facility] for 5 years.         Review of Systems  Constitutional: Positive for activity change. Negative for appetite change, chills, fatigue and fever.        Lift for transfers.    HENT: Negative for congestion and sore throat.    Respiratory: Negative for shortness of breath and wheezing.    Cardiovascular: Positive for leg swelling. Negative for chest pain.        Compression stockings   Gastrointestinal: Negative for abdominal distention, abdominal pain, constipation, diarrhea and nausea.   Genitourinary: Negative for dysuria.   Musculoskeletal: Positive for arthralgias. Negative for myalgias.   Skin: Negative for color change, rash and wound.   Neurological: Positive for numbness. Negative for dizziness and weakness.        Severe neuropathy bilateral hands   Psychiatric/Behavioral: Negative for agitation, behavioral problems and sleep disturbance.   .  Vitals:    09/11/19 0839   BP: 126/72   Pulse: 80   Resp: 18   Temp: 97.6  F (36.4  C)   SpO2: 96%   Weight: 214 lb 8 oz (97.3 kg)       Physical Exam   Abdominal: She exhibits distension. There is tenderness.   Tenderness right lower quadrant   Skin:   Multiple lap sites clean dry and intact       Constitutional: She is oriented to person, place, and time. She appears well-developed and well-nourished.   Pleasant woman in no acute distress.   HENT:   Head: Normocephalic.   Eyes: Conjunctivae are normal.   Neck: Normal range of motion.   Cardiovascular: Normal rate, regular rhythm and normal heart sounds.   No murmur heard.  Pulmonary/Chest: Breath sounds normal. No respiratory distress. She has no wheezes. She has no rales.   Abdominal: Soft. Bowel sounds are normal. She  exhibits no distension. There is no tenderness.   Musculoskeletal: She exhibits edema.   Wearing Compression socks   Neurological: She is alert and oriented to person, place, and time.   Neuropathy bilateral hands.   Skin: Skin is warm.   Psychiatric: She has a normal mood and affect. Her behavior is normal.   LABS:   No results found for this or any previous visit (from the past 240 hour(s)).  Case Management:  I have reviewed the facility/SNF care plan/MDS which was done 8/21/19, including the falls risk, nutrition and pain screening. I also reviewed the current immunizations, and preventive care.. Future cancer screening is not clinically indicated secondary to age/goals of care.   Patient's desire to return to the community is present, but is not able due to care needs .    Information reviewed:  Medications, vital signs, orders, and nursing notes.  Current Outpatient Medications   Medication Sig     acetaminophen (TYLENOL) 500 MG tablet Take 1,000 mg by mouth 3 (three) times a day .           aspirin 81 mg chewable tablet Chew 81 mg daily.     atorvastatin (LIPITOR) 80 MG tablet Take 80 mg by mouth bedtime.      carboxymethylcellulose (REFRESH PLUS) 0.5 % Dpet ophthalmic dropperette Administer 1 drop to both eyes 3 (three) times a day.     chlorthalidone (HYGROTEN) 25 MG tablet Take 25 mg by mouth daily.      dorzolamide-timolol (COSOPT) 22.3-6.8 mg/mL ophthalmic solution Administer 1 drop to both eyes 2 (two) times a day.      DULoxetine (CYMBALTA) 60 MG capsule Take 60 mg by mouth daily.     ferrous sulfate 325 (65 FE) MG tablet Take 1 tablet by mouth daily with breakfast.     folic acid (FOLVITE) 1 MG tablet Take 1 mg by mouth daily.     gabapentin (NEURONTIN) 300 MG capsule Take 600 mg by mouth 2 (two) times a day. 900 mg at bedtime           insulin glargine U-300 conc (TOUJEO MAX U-300 SOLOSTAR) 300 unit/mL (3 mL) InPn Inject 90 Units under the skin daily. 2 injections of 45 units from pen      ketoconazole (NIZORAL) 2 % shampoo Apply 1 application topically 2 (two) times a week Apply to damp skin, lather, leave on 5 minutes, and rinse. Apply on Wednesdays and Saturdays..           latanoprost (XALATAN) 0.005 % ophthalmic solution Administer 1 drop to both eyes bedtime.      liraglutide (VICTOZA) 0.6 mg/0.1 mL (18 mg/3 mL) PnIj injection Inject 1.8 mg under the skin daily. Call [phone] Dr. Washington if she develops nausea lasting >3 days.     lisinopril (PRINIVIL,ZESTRIL) 40 MG tablet Take 20 mg by mouth daily. Hold for SBP <110           loratadine (CLARITIN) 10 mg tablet Take 10 mg by mouth daily as needed for allergies.     LYRICA 75 mg capsule TAKE 1 CAP BY MOUTH THREE TIMES DAILY     metFORMIN (GLUCOPHAGE) 1000 MG tablet Take 1,000 mg by mouth daily with supper.     metoprolol tartrate (LOPRESSOR) 100 MG tablet Take 100 mg by mouth 2 (two) times a day.     mineral oil Drop Administer 3 drops into both ears daily as needed Every day shift, apply on Q-tip and rub into ears. .           nitroglycerin (NITROSTAT) 0.4 MG SL tablet Place 0.4 mg under the tongue every 5 (five) minutes as needed for chest pain. 1 tablet SL Q 5 minutes up to 3 doses. Call [phone] if chest pain persists.     olopatadine 0.2 % Drop Apply 1 drop to eye daily as needed.     ranitidine (ZANTAC) 150 MG tablet Take 150 mg by mouth 2 (two) times a day.            senna-docusate (SENNOSIDES-DOCUSATE SODIUM) 8.6-50 mg tablet Take 2 tablets by mouth as needed for constipation.            simethicone (MYLICON) 80 MG chewable tablet Chew 80 mg 4 (four) times a day. After meals and at HS     traMADol (ULTRAM) 50 mg tablet Take 1 tablet (50 mg total) by mouth 3 (three) times a day as needed for pain. For pain 6 or higher     triamcinolone (KENALOG) 0.1 % ointment Apply 1 application topically daily. Apply to legs in the morning  And to right ear prn BID     ASSESSMENT:      ICD-10-CM    1. Right lower quadrant abdominal pain R10.31    2.  Debility R53.81    3. Essential hypertension I10    4. Pain management R52        PLAN:    Bilateral calf pain - venous doppler     Abdominal pain- on scheduled ES Tylenol, Tramadol PRN Abdominal x-ray, simethicone four times a day     IDDM. FS are stable last A1C 6.6 on 7/23/19 Endocrinologist following , Continue current diabetic medications as above.      HTN. Continue  Lisinpril and metoprolol    Neuropathy.mainly  hands with reports of feet also . On lyrica and gabapentin.    CAD. HX  MI and stent.     S/p Hysterectomy   lap sites intact, monitor for S/S infection      Fatigue- check labs CBC, BMP      Electronically signed by: Shawna Mesa, CNP

## 2021-06-01 NOTE — TELEPHONE ENCOUNTER
"[facility] Nurse Triage Telephone Note      Provider: DANYELLE Wesley  Facility: [facility] Type: LTC    Caller: Rosalva  Call Back Number:  [phone]    Allergies: Nuts - unspecified and Unable to assess    Reason for call: Weightloss, 9/4/19 214.5# prior to her Hysterectomy.  Since she RA, hasn't been eating much, \"no appetite.\" Today 176#.  She did go out on RADHA to see daughter (first time since RA).     Verbal Order/Direction given by Provider: Re-check weight in am.    Provider giving order: DANYELLE Wesley    Verbal order given to: Rosalva Reis RN      "

## 2021-06-02 ENCOUNTER — RECORDS - HEALTHEAST (OUTPATIENT)
Dept: ADMINISTRATIVE | Facility: CLINIC | Age: 71
End: 2021-06-02

## 2021-06-02 VITALS — BODY MASS INDEX: 41.36 KG/M2 | WEIGHT: 233.5 LBS

## 2021-06-02 VITALS — BODY MASS INDEX: 40.57 KG/M2 | WEIGHT: 229 LBS

## 2021-06-02 NOTE — TELEPHONE ENCOUNTER
This patient's medication list and chart were reviewed as part of the service provided by [facility].    Assessment/Recommendations:  1. (Neuropathy):  Due to CrCl between 30-59ml/min, recommended Gabapentin dose and frequency is 200-700mg two times a day to avoid accumulation of med, and increased risk of adverse effects such as peripheral edema, confusion, sedation, etc.  Pt exceeding recommended dose/frequency (currently on 600mg three times a day).  Please consider reduction in dose and frequency to 700mg twice daily.    2. (GERD):  Please consider change from Ranitidine to Omeprazole.  Pt with low Hgb, on ASA, and PPI provides improved GI protection vs H2RA.  Please also note that her current CrCl is borderline for Ranitidine adjustment (CrCl<50 - recomm dose is 150mg q24hrs).  3. (HTN/edema):  Please note pt with h/o gout.  On Chlorthalidone, which may precipitate gout.  Lasix may be less likely to do this, and may consider switch.  Lyrica and Gabapentin may contribute to peripheral edema, and reduction in Gabapentin as noted above may result in less peripheral edema, and may allow reduction in diuretic dose.  BP goal <140/90mmHg reasonable.  Do not want too tight control due to increased risk of hypotension, orthostasis, dizziness, falls, tissue/cerebral hypoperfusion.  Should keep DBP >60-65 as well to avoid these side effects and reduce mortality risk.     Est CrCl (Cockroft-Gault) = 50ml/min (based on Scr 1.22 and adjusted BW 70.4kg)    Tessie Brar, Pharm.D.,[facility]  Board Certified Geriatric Pharmacist  Medication Therapy Management Pharmacist  [phone]

## 2021-06-02 NOTE — TELEPHONE ENCOUNTER
[facility] Nurse Triage Telephone Note      Provider: DANYELLE Wesley  Facility: [facility] Type: LTC    Caller: Evie  Call Back Number:  [phone]    Allergies: Nuts - unspecified and Unable to assess    Reason for call: RD recommend 4oz Glucerna two times a day due to weight loss.     Verbal Order/Direction given by Provider: OK    Provider giving order: DANYELLE Wesley    Verbal order given to: Evie Reis RN

## 2021-06-03 VITALS — WEIGHT: 229 LBS | BODY MASS INDEX: 40.57 KG/M2

## 2021-06-03 VITALS
HEART RATE: 80 BPM | TEMPERATURE: 97.6 F | DIASTOLIC BLOOD PRESSURE: 72 MMHG | OXYGEN SATURATION: 96 % | WEIGHT: 214.5 LBS | SYSTOLIC BLOOD PRESSURE: 126 MMHG | BODY MASS INDEX: 38 KG/M2 | RESPIRATION RATE: 18 BRPM

## 2021-06-03 VITALS — WEIGHT: 225.7 LBS | BODY MASS INDEX: 39.98 KG/M2

## 2021-06-04 VITALS
SYSTOLIC BLOOD PRESSURE: 155 MMHG | TEMPERATURE: 98 F | OXYGEN SATURATION: 94 % | WEIGHT: 202.5 LBS | RESPIRATION RATE: 18 BRPM | DIASTOLIC BLOOD PRESSURE: 76 MMHG | HEART RATE: 74 BPM | BODY MASS INDEX: 35.87 KG/M2

## 2021-06-04 VITALS
OXYGEN SATURATION: 96 % | TEMPERATURE: 97 F | DIASTOLIC BLOOD PRESSURE: 67 MMHG | SYSTOLIC BLOOD PRESSURE: 129 MMHG | WEIGHT: 198 LBS | BODY MASS INDEX: 35.07 KG/M2 | RESPIRATION RATE: 20 BRPM | HEART RATE: 80 BPM

## 2021-06-04 VITALS
SYSTOLIC BLOOD PRESSURE: 134 MMHG | BODY MASS INDEX: 35.87 KG/M2 | WEIGHT: 202.5 LBS | OXYGEN SATURATION: 94 % | TEMPERATURE: 97 F | DIASTOLIC BLOOD PRESSURE: 58 MMHG | HEART RATE: 78 BPM | RESPIRATION RATE: 18 BRPM

## 2021-06-04 VITALS
RESPIRATION RATE: 16 BRPM | TEMPERATURE: 97.8 F | SYSTOLIC BLOOD PRESSURE: 114 MMHG | WEIGHT: 185.5 LBS | OXYGEN SATURATION: 98 % | DIASTOLIC BLOOD PRESSURE: 68 MMHG | HEART RATE: 64 BPM | BODY MASS INDEX: 32.86 KG/M2

## 2021-06-04 NOTE — PROGRESS NOTES
[facility]    Facility:   [facility] [[id number]]   Code Status: FULL CODE      CHIEF COMPLAINT/REASON FOR VISIT:  Chief Complaint   Patient presents with     P Care Coordination - Regulatory       HISTORY:      HPI: Cristal is a 69 y.o. female residing  in the long-term care facility [facility]. She has been a resident here since October 2011. She does have multiple complex co morbidities. She is treated for hypertension and has insulin-dependent diabetes mellitus. She has a endocrinologist who is following her blood sugars. Nursing is sending weekly checks to her endocrinologist.  She has chronic neuropathy, chronic kidney disease and is treated with Cymbalta for depression. She is on gabapentin and lyrica for neuropathy pain. She is on meds for depression.  Does require a lift for transfers. She has chronic weakness in her lower extremities.      Today she is seen for a routine visit to review multiple medical issues.    She is with the recent hysterectomy October 2019. She reports she is passing flatus and having regular bowel movements.    She denies chest pain or shortness of breath.  She denies cough or congestion.   She is  non ambulatory, She is on  gabapentin and lyrica for neuropathy.. Her BS are being controlled by her endocrinologist and per staff all of her fingersticks were just sent over this past Tuesday.  She denies congestion and tells me she has a dry cough.  Her main report was increased pain in her hands in the morning but tells me as soon as she gets her meds the pain gets better.  Her weights have been stable.  It is unclear whether she is due for a mammogram or colonoscopy.  Staff will follow up with the family to see if she is due for either of those tests and if so they will get scheduled.  Past Medical History:   Diagnosis Date     Acute cystitis with hematuria      Allergic rhinitis      CAD (coronary artery disease)       Carpal tunnel syndrome      Cataract      Cerebral vascular accident (H)      Chronic kidney disease      Debility      Depression      Diabetes mellitus, type II (H)      Diabetic nephropathy (H)      GERD (gastroesophageal reflux disease)      Glaucoma      Gout      History of pyelonephritis      HTN (hypertension)      Hyperlipidemia      IDDM (insulin dependent diabetes mellitus) (H)     Type 2     Lower extremity edema      Myocardial infarction (H)              Family History   Problem Relation Age of Onset     Hypertension Mother      Stroke Mother      Cancer Father      Glaucoma Father      Arthritis Brother      Diabetes Sister      Glaucoma Sister      Social History     Socioeconomic History     Marital status: Single     Spouse name: Not on file     Number of children: Not on file     Years of education: Not on file     Highest education level: Not on file   Occupational History     Not on file   Social Needs     Financial resource strain: Not on file     Food insecurity:     Worry: Not on file     Inability: Not on file     Transportation needs:     Medical: Not on file     Non-medical: Not on file   Tobacco Use     Smoking status: Former Smoker     Smokeless tobacco: Never Used   Substance and Sexual Activity     Alcohol use: No     Drug use: No     Sexual activity: Not on file   Lifestyle     Physical activity:     Days per week: Not on file     Minutes per session: Not on file     Stress: Not on file   Relationships     Social connections:     Talks on phone: Not on file     Gets together: Not on file     Attends Anglican service: Not on file     Active member of club or organization: Not on file     Attends meetings of clubs or organizations: Not on file     Relationship status: Not on file     Intimate partner violence:     Fear of current or ex partner: Not on file     Emotionally abused: Not on file     Physically abused: Not on file     Forced sexual activity: Not on file   Other Topics  Concern     Not on file   Social History Narrative    10/13/2015 - The patient lives at [facility] for 5 years.         Review of Systems  Constitutional: Positive for activity change. Negative for appetite change, chills, fatigue and fever.        Lift for transfers.    HENT: Negative for congestion and sore throat.    Respiratory: Negative for shortness of breath and wheezing.    Cardiovascular: Positive for leg swelling. Negative for chest pain.        Compression stockings   Gastrointestinal: Negative for abdominal distention, abdominal pain, constipation, diarrhea and nausea.   Genitourinary: Negative for dysuria.   Musculoskeletal: Positive for arthralgias. Negative for myalgias.   Skin: Negative for color change, rash and wound.   Neurological: Positive for numbness. Negative for dizziness and weakness.        Severe neuropathy bilateral hands   Psychiatric/Behavioral: Negative for agitation, behavioral problems and sleep disturbance.   Vitals:    12/12/19 0924   BP: 134/58   Pulse: 78   Resp: 18   Temp: 97  F (36.1  C)   SpO2: 94%   Weight: 202 lb 8 oz (91.9 kg)       Physical Exam    Abdominal: She exhibits distension.  Abdomen is soft  Skin:   Multiple lap sites on abdomen completely healed       Constitutional: She is oriented to person, place, and time. She appears well-developed and well-nourished.   Pleasant woman in no acute distress.   HENT:   Head: Normocephalic.   Eyes: Conjunctivae are normal.   Neck: Normal range of motion.   Cardiovascular: Normal rate, regular rhythm and normal heart sounds.   No murmur heard.  Pulmonary/Chest: Breath sounds normal. No respiratory distress. She has no wheezes. She has no rales.   Abdominal: Soft. Bowel sounds are normal. She exhibits no distension. There is no tenderness.   Musculoskeletal: She exhibits edema.   Wearing Compression socks   Neurological: She is alert and oriented to person, place, and time.   Neuropathy bilateral hands.   Skin: Skin is warm.    Psychiatric: She has a normal mood and affect. Her behavior is normal  LABS:   No results found for this or any previous visit (from the past 240 hour(s)).  Case Management:  I have reviewed the facility/SNF care plan/MDS which was done 11/14/19, including the falls risk, nutrition and pain screening. I also reviewed the current immunizations, and preventive care..  Staff to follow-up with family regarding colonoscopy and mammogram.  Patient's desire to return to the community is not present.    Information reviewed:  Medications, vital signs, orders, and nursing notes.    ASSESSMENT:      ICD-10-CM    1. IDDM (insulin dependent diabetes mellitus) (H) E11.9     Z79.4    2. Essential hypertension I10    3. Neuropathy G62.9    4. Pain management R52        PLAN:       IDDM. FS are stable last A1C 6.6 on 7/23/19 Endocrinologist following , Continue current diabetic medications as above.       HTN. Continue  Lisinpril and metoprolol     Neuropathy.mainly  hands with reports of feet also . On lyrica and gabapentin.     CAD. HX  MI and stent.      S/p Hysterectomy in October   [facility] sites healed    Staff to follow-up with family to see if she is due for a colonoscopy or a mammogram.       Electronically signed by: Shawna Mesa CNP

## 2021-06-05 VITALS
OXYGEN SATURATION: 97 % | RESPIRATION RATE: 18 BRPM | TEMPERATURE: 98 F | HEART RATE: 74 BPM | BODY MASS INDEX: 30.73 KG/M2 | DIASTOLIC BLOOD PRESSURE: 76 MMHG | SYSTOLIC BLOOD PRESSURE: 128 MMHG | WEIGHT: 173.5 LBS

## 2021-06-05 VITALS
TEMPERATURE: 98.5 F | BODY MASS INDEX: 32.24 KG/M2 | RESPIRATION RATE: 20 BRPM | HEART RATE: 72 BPM | WEIGHT: 182 LBS | SYSTOLIC BLOOD PRESSURE: 114 MMHG | DIASTOLIC BLOOD PRESSURE: 68 MMHG | OXYGEN SATURATION: 95 %

## 2021-06-05 VITALS
RESPIRATION RATE: 16 BRPM | HEART RATE: 72 BPM | WEIGHT: 184.5 LBS | OXYGEN SATURATION: 99 % | TEMPERATURE: 98.2 F | DIASTOLIC BLOOD PRESSURE: 86 MMHG | SYSTOLIC BLOOD PRESSURE: 107 MMHG | BODY MASS INDEX: 32.68 KG/M2

## 2021-06-05 VITALS
BODY MASS INDEX: 32.06 KG/M2 | OXYGEN SATURATION: 96 % | HEART RATE: 76 BPM | SYSTOLIC BLOOD PRESSURE: 115 MMHG | DIASTOLIC BLOOD PRESSURE: 58 MMHG | WEIGHT: 181 LBS | RESPIRATION RATE: 18 BRPM | TEMPERATURE: 98.3 F

## 2021-06-06 NOTE — PROGRESS NOTES
[facility]    Facility:   [facility] [[id number]]   Code Status: FULL CODE      CHIEF COMPLAINT/REASON FOR VISIT:  Chief Complaint   Patient presents with     [facility] Care Coordination - Home Visit-Annual Assessment       HISTORY:      HPI: Cristal is a 69 y.o. female residing  in the long-term care facility [facility]. She has been a resident here since October 2011. She does have multiple complex co morbidities. She is treated for hypertension and has insulin-dependent diabetes mellitus. She has a endocrinologist who is following her blood sugars. Nursing is sending weekly checks to her endocrinologist.  She has chronic neuropathy, chronic kidney disease and is treated with Cymbalta for depression. She is on gabapentin and lyrica for neuropathy pain. She is on meds for depression.  Does require a lift for transfers. She has chronic weakness in her lower extremities.      Today she is seen for a routine regulatory visit visit to review multiple medical issues.    She is with the recent hysterectomy October 2019. She reports she is passing flatus and having regular bowel movements.    She denies chest pain or shortness of breath.  She denies cough or congestion.   She is  non ambulatory, She is on  gabapentin and lyrica for neuropathy.. Her BS are being controlled by her endocrinologist and per staff all of her fingersticks were sent on to 11/20/2020 and they are to be sent every 3 months.  She denies congestion.  Her weights have been stable.  She did just have a recent mammogram and a colonoscopy and tells me she is not due for another colonoscopy for 5 more years.  Per staff she is on a  restorative program and she gets daily exercises Monday through Friday.  Her blood sugar this morning was 133.        Past Medical History:   Diagnosis Date     Acute cystitis with hematuria      Allergic rhinitis      CAD (coronary artery disease)      Carpal tunnel syndrome       Cataract      Cerebral vascular accident (H)      Chronic kidney disease      Debility      Depression      Diabetes mellitus, type II (H)      Diabetic nephropathy (H)      GERD (gastroesophageal reflux disease)      Glaucoma      Gout      History of pyelonephritis      HTN (hypertension)      Hyperlipidemia      IDDM (insulin dependent diabetes mellitus) (H)     Type 2     Lower extremity edema      Myocardial infarction (H)              Family History   Problem Relation Age of Onset     Hypertension Mother      Stroke Mother      Cancer Father      Glaucoma Father      Arthritis Brother      Diabetes Sister      Glaucoma Sister      Social History     Socioeconomic History     Marital status: Single     Spouse name: Not on file     Number of children: Not on file     Years of education: Not on file     Highest education level: Not on file   Occupational History     Not on file   Social Needs     Financial resource strain: Not on file     Food insecurity:     Worry: Not on file     Inability: Not on file     Transportation needs:     Medical: Not on file     Non-medical: Not on file   Tobacco Use     Smoking status: Former Smoker     Smokeless tobacco: Never Used   Substance and Sexual Activity     Alcohol use: No     Drug use: No     Sexual activity: Not on file   Lifestyle     Physical activity:     Days per week: Not on file     Minutes per session: Not on file     Stress: Not on file   Relationships     Social connections:     Talks on phone: Not on file     Gets together: Not on file     Attends Restorationism service: Not on file     Active member of club or organization: Not on file     Attends meetings of clubs or organizations: Not on file     Relationship status: Not on file     Intimate partner violence:     Fear of current or ex partner: Not on file     Emotionally abused: Not on file     Physically abused: Not on file     Forced sexual activity: Not on file   Other Topics Concern     Not on file    Social History Narrative    10/13/2015 - The patient lives at [facility] for [facility] years.         Review of Systems  Constitutional: Positive for activity change. Negative for appetite change, chills, fatigue and fever.        Lift for transfers.    HENT: Negative for congestion and sore throat.    Respiratory: Negative for shortness of breath and wheezing.    Cardiovascular: Positive for leg swelling. Negative for chest pain.        Compression stockings   Gastrointestinal: Negative for abdominal distention, abdominal pain, constipation, diarrhea and nausea.   Genitourinary: Negative for dysuria.   Musculoskeletal: Positive for arthralgias. Negative for myalgias.   Skin: Negative for color change, rash and wound.   Neurological: Positive for numbness. Negative for dizziness and weakness.        Severe neuropathy bilateral hands   Psychiatric/Behavioral: Negative for agitation, behavioral problems and sleep disturbance.   Vitals:    02/12/20 1101   BP: 155/76   Pulse: 74   Resp: 18   Temp: 98  F (36.7  C)   SpO2: 94%   Weight: 202 lb 8 oz (91.9 kg)       Physical Exam    Abdominal: She exhibits distension.  Abdomen is soft  Skin:   Multiple lap sites on abdomen completely healed       Constitutional: She is oriented to person, place, and time. She appears well-developed and well-nourished.   Pleasant woman in no acute distress.   HENT:   Head: Normocephalic.   Eyes: Conjunctivae are normal.   Neck: Normal range of motion.   Cardiovascular: Normal rate, regular rhythm and normal heart sounds.   No murmur heard.  Pulmonary/Chest: Breath sounds normal. No respiratory distress. She has no wheezes. She has no rales.   Abdominal: Soft. Bowel sounds are normal. She exhibits no distension. There is no tenderness.   Musculoskeletal: She exhibits edema.   Wearing Compression socks   Neurological: She is alert and oriented to person, place, and time.   Neuropathy bilateral hands.   Skin: Skin is warm.   Psychiatric: She has  a normal mood and affect. Her behavior is normal  LABS:   No results found for this or any previous visit (from the past 240 hour(s)).  Case Management:  I have reviewed the facility/SNF care plan/MDS which was done 2/10/20 including the falls risk, nutrition and pain screening. I also reviewed the current immunizations, and preventive care..  Staff to follow-up with family regarding colonoscopy and mammogram.  Patient's desire to return to the community is not present.    Information reviewed:  Medications, vital signs, orders, and nursing notes.    ASSESSMENT:      ICD-10-CM    1. Diabetic peripheral neuropathy associated with type 2 diabetes mellitus (H) E11.42    2. Depression, unspecified depression type F32.9    3. Essential hypertension I10        PLAN:       IDDM. FS are stable last A1C 6.6 on 7/23/19 Endocrinologist following , Continue current diabetic medications as above.   She is followed by endocrinology      HTN. Continue  Lisinpril and metoprolol     Neuropathy.mainly  hands with reports of feet also . On lyrica and gabapentin.     CAD. HX  MI and stent.      S/p Hysterectomy in October   [facility] sites healed    Depression on Cymbalta       Electronically signed by: Shawna Mesa, CNP

## 2021-06-07 NOTE — PROGRESS NOTES
[facility]    Facility:   [facility] [[id number]]   Code Status: FULL CODE      CHIEF COMPLAINT/REASON FOR VISIT:  Chief Complaint   Patient presents with     [facility] Care Coordination - Home Visit-Annual Assessment       HISTORY:      HPI: Cristal is a 69 y.o. female residing  in the long-term care facility [facility]. She has been a resident here since October 2011. She does have multiple complex co morbidities. She is treated for hypertension and has insulin-dependent diabetes mellitus. She has a endocrinologist who is following her blood sugars. Nursing is sending weekly checks to her endocrinologist.  She has chronic neuropathy, chronic kidney disease and is treated with Cymbalta for depression. She is on gabapentin and lyrica for neuropathy pain. She is on meds for depression.  Does require a lift for transfers. She has chronic weakness in her lower extremities.      Today she is seen for an annual regulatory visit  to review multiple medical issues.    She is with the recent hysterectomy October 2019.   She denies constipation or diarrhea.  She reports she is passing flatus and having regular bowel movements.    She denies chest pain or shortness of breath.  She denies cough or congestion.   She is  non ambulatory, She is on  gabapentin and lyrica for neuropathy.. Her BS are being controlled by her endocrinologist and staff is sending her readings.  she denies congestion.  Her weights have been stable over the last month.  She is up to date on her mammogram and  colonoscopy and she is not due for another colonoscopy for 5 more years.  Per staff she is on a  restorative program and she gets daily exercises Monday through Friday.  She had no lower extremity edema.  Her last A1c was 6.9 on 3/18/2020      Past Medical History:   Diagnosis Date     Acute cystitis with hematuria      Allergic rhinitis      CAD (coronary artery disease)      Carpal tunnel  syndrome      Cataract      Cerebral vascular accident (H)      Chronic kidney disease      Debility      Depression      Diabetes mellitus, type II (H)      Diabetic nephropathy (H)      GERD (gastroesophageal reflux disease)      Glaucoma      Gout      History of pyelonephritis      HTN (hypertension)      Hyperlipidemia      IDDM (insulin dependent diabetes mellitus) (H)     Type 2     Lower extremity edema      Myocardial infarction (H)              Family History   Problem Relation Age of Onset     Hypertension Mother      Stroke Mother      Cancer Father      Glaucoma Father      Arthritis Brother      Diabetes Sister      Glaucoma Sister      Social History     Socioeconomic History     Marital status: Single     Spouse name: Not on file     Number of children: Not on file     Years of education: Not on file     Highest education level: Not on file   Occupational History     Not on file   Social Needs     Financial resource strain: Not on file     Food insecurity     Worry: Not on file     Inability: Not on file     Transportation needs     Medical: Not on file     Non-medical: Not on file   Tobacco Use     Smoking status: Former Smoker     Smokeless tobacco: Never Used   Substance and Sexual Activity     Alcohol use: No     Drug use: No     Sexual activity: Not on file   Lifestyle     Physical activity     Days per week: Not on file     Minutes per session: Not on file     Stress: Not on file   Relationships     Social connections     Talks on phone: Not on file     Gets together: Not on file     Attends Sabianism service: Not on file     Active member of club or organization: Not on file     Attends meetings of clubs or organizations: Not on file     Relationship status: Not on file     Intimate partner violence     Fear of current or ex partner: Not on file     Emotionally abused: Not on file     Physically abused: Not on file     Forced sexual activity: Not on file   Other Topics Concern     Not on file    Social History Narrative    10/13/2015 - The patient lives at [facility] for 5 years.         Review of Systems  Constitutional: Positive for activity change. Negative for appetite change, chills, fatigue and fever.        Lift for transfers.    HENT: Negative for congestion and sore throat.    Respiratory: Negative for shortness of breath and wheezing.    Cardiovascular: negative for leg swelling  Negative for chest pain.        Compression stockings   Gastrointestinal: Negative for abdominal distention, abdominal pain, constipation, diarrhea and nausea.   Genitourinary: Negative for dysuria.   Musculoskeletal: Positive for arthralgias. Negative for myalgias.   Skin: Negative for color change, rash and wound.   Neurological: Positive for numbness. Negative for dizziness and weakness.        Severe neuropathy bilateral hands   Psychiatric/Behavioral: Negative for agitation, behavioral problems and sleep disturbance.   Vitals:    03/19/20 0907   BP: 129/67   Pulse: 80   Resp: 20   Temp: 97  F (36.1  C)   SpO2: 96%   Weight: 198 lb (89.8 kg)       Physical Exam    Abdominal: She exhibits distension.  Abdomen is soft  Skin:   Healed lap sites.    Constitutional: She is oriented to person, place, and time. She appears well-developed and well-nourished.   Pleasant woman in no acute distress.   HENT:   Head: Normocephalic.   Eyes: Conjunctivae are normal.   Neck: Normal range of motion.   Cardiovascular: Normal rate, regular rhythm and normal heart sounds.   No murmur heard.  Pulmonary/Chest: Breath sounds normal. No respiratory distress. She has no wheezes. She has no rales.   Abdominal: Soft. Bowel sounds are normal. She exhibits no distension. There is no tenderness.   Musculoskeletal: Patient is a [place] lift  Wearing Compression socks   Neurological: She is alert and oriented to person, place, and time.   Neuropathy bilateral hands.   Skin: Skin is warm.   Psychiatric: She has a normal mood and affect.  Her behavior is normal  LABS:   Recent Results (from the past 240 hour(s))   Glycosylated Hemoglobin A1C   Result Value Ref Range    Hemoglobin A1c 6.9 (H) <=5.6 %     Case Management:  I have reviewed the facility/SNF care plan/MDS which was done 2/10/20 including the falls risk, nutrition and pain screening. I also reviewed the current immunizations, and preventive care..  Staff to follow-up with family regarding colonoscopy and mammogram.  Patient's desire to return to the community is not present.    Information reviewed:  Medications, vital signs, orders, and nursing notes.    ASSESSMENT:      ICD-10-CM    1. Type 2 diabetes mellitus with hyperglycemia, with long-term current use of insulin (H)  E11.65     Z79.4    2. Essential hypertension  I10    3. Age-related physical debility  R54        PLAN:       IDDM. FS are stable last A1C 6.9 on 3/18/2020 Endocrinologist following , Continue current diabetic medications as above.   She is followed by endocrinology      HTN. Continue  Lisinpril and metoprolol     Neuropathy.mainly  hands with reports of feet also . On lyrica and gabapentin.     CAD. HX  MI and stent.      S/p Hysterectomy in October   lap sites healed    Depression on Cymbalta       Electronically signed by: Shawna Mesa, CNP

## 2021-06-07 NOTE — TELEPHONE ENCOUNTER
[facility] Nurse Triage Telephone Note      Provider: DANYELLE Herrera  Facility: [facility] Type: LTC    Caller: Evie  Call Back Number:  [phone]    Allergies: Nuts - unspecified and Unable to assess    Reason for call: Pt C/O GI upset for about 1 week. Is on Prilosec 20mg daily. (Previously was on Zantac 150mg two times a day) Dec 203#, 4/22 192#, 4/30 185#.     Verbal Order/Direction given by Provider: Increase prilosec to 20mg two times a day. Recheck wt in am to verify 185#.    Provider giving order: DANYELLE Wesley    Verbal order given to: Evie Reis RN

## 2021-06-07 NOTE — PROGRESS NOTES
[facility]    Facility:   [facility] [[id number]]   Code Status: FULL CODE       Chief Complaint   Patient presents with     Review Of Multiple Medical Conditions     [facility] 3/26/20.       HPI:   Cristal is a 70 y.o. female seen for routine physician follow up in [facility] at [facility]. She has been a resident here since October 2011. She does have multiple complex co morbidities. She is treated for hypertension and has insulin-dependent diabetes mellitus. She sees an endocrinologist. She has chronic neuropathy, chronic kidney disease and is treated with Cymbalta for depression and chronic pain. She is on gabapentin and lyrica for neuropathy pain. She has chronic weakness in her lower extremities and is wheelchair bound. She has chronic urinary incontinence. Hospitalized in April 2018 for chest pain. It is noted she has coronary artery disease having had PCI with stent placement in 2009. Her chest pain was reproducible on exam. Negative 3 sets of troponin. EKG showed no significant ischemic changes. Pharmacological stress was negative for inducible ischemia so no intervention was required. She has periodic follow up with cardiology.     She was admitted to the [facility] on 8/5/19 for planned hysterectomy. She had been experiencing brownish vaginal discharge, referred to gynecology with endometrial biopsy showing atypia. Her hospital course was uneventful, she underwent davinci assisted total laparoscopic hysterectomy with bilateral salping oophorectomy. She returned to [facility] on 8/6/19.    Today:  Today she is doing well. She is sitting doing a puzzle and watching TV. Has no complaints. Has not been ill recently with cough cold or congestion. She is wearing a soft boot on left foot. Previously had heel wound but the skin is intact, wound has healed, fragile so protected with the boot. Mepilex for protection on buttock, no open area. She is nonambulatory in  wheelchair. Has chronic pain. No new concerns. No shortness of breath or chest pain. No abdominal pain.  BPs satisfactory. Accuchecks followed for DM, she is on victoza, metformin and insulin, endocrine adjusts meds if needed.       Past Medical History:  Past Medical History:   Diagnosis Date     Acute cystitis with hematuria      Allergic rhinitis      CAD (coronary artery disease)      Carpal tunnel syndrome      Cataract      Cerebral vascular accident (H)      Chronic kidney disease      Debility      Depression      Diabetes mellitus, type II (H)      Diabetic nephropathy (H)      GERD (gastroesophageal reflux disease)      Glaucoma      Gout      History of pyelonephritis      HTN (hypertension)      Hyperlipidemia      IDDM (insulin dependent diabetes mellitus) (H)     Type 2     Lower extremity edema      Myocardial infarction (H)        Medications:  Current Outpatient Medications   Medication Sig     acetaminophen (TYLENOL) 500 MG tablet Take 1,000 mg by mouth 3 (three) times a day .           aspirin 81 mg chewable tablet Chew 81 mg daily.     atorvastatin (LIPITOR) 80 MG tablet Take 80 mg by mouth bedtime.      carboxymethylcellulose (REFRESH PLUS) 0.5 % Dpet ophthalmic dropperette Administer 1 drop to both eyes 3 (three) times a day.     chlorthalidone (HYGROTEN) 25 MG tablet Take 25 mg by mouth daily.      dorzolamide-timolol (COSOPT) 22.3-6.8 mg/mL ophthalmic solution Administer 1 drop to both eyes 2 (two) times a day.      DULoxetine (CYMBALTA) 60 MG capsule Take 90 mg by mouth daily.      ferrous sulfate 325 (65 FE) MG tablet Take 1 tablet by mouth daily with breakfast.     folic acid (FOLVITE) 1 MG tablet Take 1 mg by mouth daily.     gabapentin (NEURONTIN) 300 MG capsule Take 600 mg by mouth 3 (three) times a day.            insulin glargine U-300 conc (TOUJEO MAX U-300 SOLOSTAR) 300 unit/mL (3 mL) InPn Inject 90 Units under the skin daily. 2 injections of 45 units from pen     ketoconazole  (NIZORAL) 2 % shampoo Apply 1 application topically 2 (two) times a week Apply to damp skin, lather, leave on 5 minutes, and rinse. Apply on Wednesdays and Saturdays..           latanoprost (XALATAN) 0.005 % ophthalmic solution Administer 1 drop to both eyes bedtime.      liraglutide (VICTOZA) 0.6 mg/0.1 mL (18 mg/3 mL) PnIj injection Inject 1.8 mg under the skin daily. Call [phone] Dr. Washington if she develops nausea lasting >3 days.     loratadine (CLARITIN) 10 mg tablet Take 10 mg by mouth daily as needed for allergies.     LYRICA 75 mg capsule TAKE 1 CAP BY MOUTH THREE TIMES DAILY     metFORMIN (GLUCOPHAGE-XR) 500 MG 24 hr tablet Take 1,000 mg by mouth daily.     metoprolol tartrate (LOPRESSOR) 100 MG tablet Take 100 mg by mouth 2 (two) times a day.     nitroglycerin (NITROSTAT) 0.4 MG SL tablet Place 0.4 mg under the tongue every 5 (five) minutes as needed for chest pain. 1 tablet SL Q 5 minutes up to 3 doses. Call [phone] if chest pain persists.     olopatadine 0.2 % Drop Apply 1 drop to eye daily as needed.     omeprazole (PRILOSEC) 20 MG capsule Take 20 mg by mouth daily before breakfast.     senna-docusate (SENNOSIDES-DOCUSATE SODIUM) 8.6-50 mg tablet Take 2 tablets by mouth 2 (two) times a day as needed for constipation.            simethicone (MYLICON) 80 MG chewable tablet Chew 80 mg 4 (four) times a day. After meals and at HS     traMADol (ULTRAM) 50 mg tablet Take 1 tablet (50 mg total) by mouth 3 (three) times a day as needed for pain. For pain 6 or higher     triamcinolone (KENALOG) 0.1 % ointment Apply 1 application topically daily. Apply to legs in the morning  And to right ear two times a day prn             Physical Exam:   General: Patient is alert female, no distress.   Vitals: /68, Temp 97.4, Pulse 72, RR 18, O2 sat 93% RA.  HEENT: Head is NCAT. Eyes show no injection or icterus. Nares negative. Oropharynx well hydrated.  Back: No spinal or CVA tenderness.  : Deferred.  Extremities:  Mild LE edema is noted.  Musculoskeletal: Deformities small joints hands.  Psych: Mood appears good.      Labs:  Lab Results   Component Value Date    HGBA1C 7.0 (H) 01/10/2019     Lab Results   Component Value Date    HGBA1C 6.9 (H) 03/18/2020     Lab Results   Component Value Date    WBC 4.3 09/13/2019    HGB 9.8 (L) 09/13/2019    HCT 31.3 (L) 09/13/2019     (H) 09/13/2019     09/13/2019     Results for orders placed or performed in visit on 09/27/19   Basic Metabolic Panel   Result Value Ref Range    Sodium 134 (L) 136 - 145 mmol/L    Potassium 4.1 3.5 - 5.0 mmol/L    Chloride 105 98 - 107 mmol/L    CO2 20 (L) 22 - 31 mmol/L    Anion Gap, Calculation 9 5 - 18 mmol/L    Glucose 107 70 - 125 mg/dL    Calcium 9.9 8.5 - 10.5 mg/dL    BUN 25 (H) 8 - 22 mg/dL    Creatinine 1.22 (H) 0.60 - 1.10 mg/dL    GFR MDRD Af Amer 53 (L) >60 mL/min/1.73m2    GFR MDRD Non Af Amer 44 (L) >60 mL/min/1.73m2         Assessment/Plan:  1. IDDM. On insulin toujeo, victoza, and metformin. Continue to follow accuchecks. She sees endocrinology. Last A1c good at 6.9%.  2. HTN. BPs overall acceptable. On lisinopril, metoprolol, chlorthalidone.    3. Neuropathy. Chronic pain controlled with gabapentin, lyrica and Cymbalta.  4. Hx of stroke. Wheelchair bound, non ambulatory. On aspirin.  5. Glaucoma. Visual impairment at baseline.  6. Hx CVA.    7. Depression. Continue cymbalta.  8. CKD. Labs as noted above.    Overall she appears stable, no new orders needed.         Electronically signed by: Meagan Valdez MD

## 2021-06-08 NOTE — PROGRESS NOTES
[facility]    Facility:   [facility] [[id number]]   Code Status: FULL CODE       Chief Complaint   Patient presents with     Review Of Multiple Medical Conditions       HPI:  Cristal Preciado is a 66-year-old female being seen for routine physician follow-up in the long-term care facility [facility]. She has been a resident here since October 2011 but we took over her in-house care as of September 2015. She does have multiple complex co morbidities. She is treated for hypertension and has insulin-dependent diabetes mellitus. She has a endocrinologist who follows her closely. She has chronic neuropathy, chronic kidney disease and is treated with Cymbalta for depression.    Generally she has been doing well, stable. She is non ambulatory. She is on gabapentin and lyrica for neuropathy pain. She is on meds for depression.  Does require a lift for transfers. She has chronic weakness in her lower extremities. She has not been ill recently with any cough, cold or congestion. She has a good appetite. She denies headache, nausea, vomiting, chest pain, shortness of breath. No change in bowel or bladder habits. No open areas on her skin. No new concerns.      MEDICATIONS:  Current Outpatient Prescriptions   Medication Sig     acetaminophen (TYLENOL) 500 MG tablet Take 1,000 mg by mouth bedtime. Give at bedtime; also has additional pm orders. For hand pain. Don't exceed 3000mg/24hrs     acetaminophen (TYLENOL) 500 MG tablet Take 1,000 mg by mouth daily. Don't exceed 3000mg/24hrs     acetaminophen (TYLENOL) 500 MG tablet Take 500 mg by mouth every 8 (eight) hours as needed for pain. For pain level 1-5. Don't exceed 3000mg/24hrs     acetaminophen (TYLENOL) 500 MG tablet Take 1,000 mg by mouth every 6 (six) hours as needed for pain. For pain 6-10. Don't exceed 3000mg/24hrs     allopurinol (ZYLOPRIM) 100 MG tablet Take 200 mg by mouth daily.      ammonium lactate  (AMLACTIN) 12 % cream Apply 1 application topically as needed for dry skin. Apply to feet     artificial tears,hypromellose, (GENTEAL) 0.3 % Gel Administer 0.25 inches to both eyes every hour as needed.     aspirin 81 mg chewable tablet Chew 81 mg daily.     atorvastatin (LIPITOR) 80 MG tablet Take 80 mg by mouth bedtime.      calcium carbonate-vit D3-min 600 mg calcium- 400 unit Tab Take 1 tablet by mouth 2 (two) times a day.      chlorthalidone (HYGROTEN) 25 MG tablet Take 12.5 mg by mouth daily.      dorzolamide-timolol (COSOPT) 22.3-6.8 mg/mL ophthalmic solution Administer 1 drop to both eyes 2 (two) times a day.      DULoxetine (CYMBALTA) 30 MG capsule Take 30 mg by mouth daily.     ferrous sulfate 325 (65 FE) MG tablet Take 1 tablet by mouth daily with breakfast.     folic acid (FOLVITE) 1 MG tablet Take 1 mg by mouth daily.     gabapentin (NEURONTIN) 300 MG capsule Take 1,000 mg by mouth 3 (three) times a day.      insulin aspart (NOVOLOG) 100 unit/mL injection Inject 15 Units under the skin as needed. With each snack in addition to sliding scale and lantus     insulin aspart (NOVOLOG) 100 unit/mL injection Inject 72 Units under the skin daily before lunch.      insulin aspart (NOVOLOG) 100 unit/mL injection Inject 72 Units under the skin daily before supper.      insulin aspart (NOVOLOG) 100 unit/mL injection Inject 75 Units under the skin Daily before breakfast.      insulin aspart (NOVOLOG) 100 unit/mL injection Inject under the skin 4 (four) times a day. Sliding scale:   140-180: Give 4 units  181-220: Give 6 units  221-260: Give 8 units  261-300: Give 10  301-340: Give 12 units  341-380: Give 14 units  >381: Give 14 units and call MD     insulin glargine (LANTUS) 100 unit/mL injection Inject 100 Units under the skin 2 (two) times a day.      latanoprost (XALATAN) 0.005 % ophthalmic solution Administer 1 drop to both eyes bedtime.      liraglutide (VICTOZA) 0.6 mg/0.1 mL (18 mg/3 mL) PnIj injection Inject  1.8 mg under the skin daily. Call [phone] Dr. Washington if she develops nausea lasting >3 days.     lisinopril (PRINIVIL,ZESTRIL) 10 MG tablet Take 10 mg by mouth daily. Hold for SBP<110     loratadine (CLARITIN) 10 mg tablet Take 10 mg by mouth daily as needed for allergies.     metoprolol tartrate (LOPRESSOR) 50 MG tablet Take 50 mg by mouth 2 (two) times a day.      mineral oil Drop Administer 3 drops into both ears every morning. Every day shift, apply on Q-tip and rub into ears.     mineral oil Drop Administer 4 drops into both ears see administration instructions. Instill 4 drop in both ears BID x4days then irrigate     nitroglycerin (NITROSTAT) 0.4 MG SL tablet Place 0.4 mg under the tongue every 5 (five) minutes as needed for chest pain. 1 tablet SL Q 5 minutes up to 3 doses. Call [phone] if chest pain persists.     pregabalin (LYRICA) 25 MG capsule Take 50 mg by mouth 3 (three) times a day.      ranitidine (ZANTAC) 150 MG tablet Take 150 mg by mouth once daily.      senna-docusate (SENNOSIDES-DOCUSATE SODIUM) 8.6-50 mg tablet Take 1 tablet by mouth daily.      tolnaftate (ANTIFUNGAL, TOLNAFTATE,) 1 % powder Apply 1 application topically 2 (two) times a day. Apply to feet     traMADol (ULTRAM) 50 mg tablet Take 50 mg by mouth every 8 (eight) hours as needed for pain.       PHYSICAL EXAM:  Gen.: Patient is alert, pleasant, female, sitting in a wheelchair, no distress.  HEENT: Eyes show no conjunctival injection or icterus. Nares negative. Oropharynx moist.  Lungs: Clear. No wheezes.  Cardiovascular: Regular rate and rhythm, normal S1 and S2.  Abdomen: Obese, soft, non tender.  Back: No reproducible tenderness. No bruising noted.  Extremities: She does have moderate lower extremity edema, is wearing Jobst stockings.  Musculoskeletal: Degenerative changes noted.      DIAGNOSTICS:  10/20/15: sodium 138, potassium 4.0, BUN 19, creatinine 0.86, GFR greater than 60.  9/12/16: Hgb A1C 7.5%.      ASSESSMENT/PLAN:  1.  IDDM. Monitor and adjust insulin as needed.  2. Neuropathy. Stable on gabapentin and HS Lyrica.  3. Hypertension. BPs controlled with chlorthalidone, Lisinopril, Metoprolol.  4. Chronic kidney disease.  5. Depression. On Cymbalta which also helps with chronic neuropathic pain.    Overall she is stable. Diabetic changes as needed otherwise continue care plan.      Electronically signed by: Meagan Valdez MD

## 2021-06-08 NOTE — PROGRESS NOTES
[facility]    Facility:   [facility] [[id number]]   Code Status: FULL CODE      CHIEF COMPLAINT/REASON FOR VISIT:  Chief Complaint   Patient presents with     FVP Care Coordination - Regulatory       HISTORY:      HPI: Cristal is a 70 y.o. female residing  in the long-term care facility [facility]. She has been a resident here since October 2011. She does have multiple complex co morbidities. She is treated for hypertension and has insulin-dependent diabetes mellitus. She has a endocrinologist who is following her blood sugars. Nursing is sending weekly checks to her endocrinologist.  She has chronic neuropathy, chronic kidney disease and is treated with Cymbalta for depression. She is on gabapentin and lyrica for neuropathy pain. She is on meds for depression.  Does require a lift for transfers. She has chronic weakness in her lower extremities.      Today she is seen for a routine  Regulatory visit .  She is with a  hysterectomy October 2019.   She denies chest pain or shortness of breath.  She denies cough or congestion.   She is  non ambulatory, She is on  gabapentin and lyrica for neuropathy.. Her BS are being controlled by her endocrinologist.   She denies congestion and tells me she has a dry cough and requested cough drops because she can not get out .. Her weights were reviewed and she is down 7 pounds over the last month..      Past Medical History:   Diagnosis Date     Acute cystitis with hematuria      Allergic rhinitis      CAD (coronary artery disease)      Carpal tunnel syndrome      Cataract      Cerebral vascular accident (H)      Chronic kidney disease      Debility      Depression      Diabetes mellitus, type II (H)      Diabetic nephropathy (H)      GERD (gastroesophageal reflux disease)      Glaucoma      Gout      History of pyelonephritis      HTN (hypertension)      Hyperlipidemia      IDDM (insulin dependent diabetes mellitus) (H)      Type 2     Lower extremity edema      Myocardial infarction (H)              Family History   Problem Relation Age of Onset     Hypertension Mother      Stroke Mother      Cancer Father      Glaucoma Father      Arthritis Brother      Diabetes Sister      Glaucoma Sister      Social History     Socioeconomic History     Marital status: Single     Spouse name: Not on file     Number of children: Not on file     Years of education: Not on file     Highest education level: Not on file   Occupational History     Not on file   Social Needs     Financial resource strain: Not on file     Food insecurity     Worry: Not on file     Inability: Not on file     Transportation needs     Medical: Not on file     Non-medical: Not on file   Tobacco Use     Smoking status: Former Smoker     Smokeless tobacco: Never Used   Substance and Sexual Activity     Alcohol use: No     Drug use: No     Sexual activity: Not on file   Lifestyle     Physical activity     Days per week: Not on file     Minutes per session: Not on file     Stress: Not on file   Relationships     Social connections     Talks on phone: Not on file     Gets together: Not on file     Attends [employer] service: Not on file     Active member of club or organization: Not on file     Attends meetings of clubs or organizations: Not on file     Relationship status: Not on file     Intimate partner violence     Fear of current or ex partner: Not on file     Emotionally abused: Not on file     Physically abused: Not on file     Forced sexual activity: Not on file   Other Topics Concern     Not on file   Social History Narrative    10/13/2015 - The patient lives at [facility] for [facility] years.         Review of Systems  Constitutional: Positive for activity change. Negative for appetite change, chills, fatigue and fever.        Lift for transfers.    HENT: Negative for congestion and sore throat.    Respiratory: Negative for shortness of breath and wheezing.     Cardiovascular: negative for leg swelling . Negative for chest pain. (intermittent Right  upper chest discomfort. She was seen by cardiology and no known cause found. Resident reports relief with Tylenol)       Compression stockings   Gastrointestinal: Negative for abdominal distention, abdominal pain, constipation, diarrhea and nausea.   Genitourinary: Negative for dysuria.   Musculoskeletal: Positive for arthralgias. Negative for myalgias.   Skin: Negative for color change, rash and wound.   Neurological: Positive for numbness. Negative for dizziness and weakness.        Severe neuropathy bilateral hands   Psychiatric/Behavioral: Negative for agitation, behavioral problems and sleep disturbance.   Vitals:    06/08/20 0907   BP: 114/68   Pulse: 64   Resp: 16   Temp: 97.8  F (36.6  C)   SpO2: 98%   Weight: 185 lb 8 oz (84.1 kg)       Physical Exam    Abdominal: She exhibits distension.  Abdomen is soft  Skin:    healed old lap sites on abdomen     Constitutional: She is oriented to person, place, and time. She appears well-developed and well-nourished.   Pleasant woman in no acute distress.   HENT:   Head: Normocephalic.   Eyes: Conjunctivae are normal.   Neck: Normal range of motion.   Cardiovascular: Normal rate, regular rhythm and normal heart sounds.   No murmur heard.  Pulmonary/Chest: Breath sounds normal. No respiratory distress. She has no wheezes. She has no rales.   Abdominal: Soft. Bowel sounds are normal. She exhibits no distension. There is no tenderness.   Musculoskeletal: She exhibits edema.   Wearing Compression socks   Neurological: She is alert and oriented to person, place, and time.   Neuropathy bilateral hands.   Skin: Skin is warm.   Psychiatric: She has a normal mood and affect. Her behavior is normal  LABS:   No results found for this or any previous visit (from the past 240 hour(s)).  Case Management:  I have reviewed the facility/SNF care plan/MDS which was done 5/4/20, including the  falls risk, nutrition and pain screening. I also reviewed the current immunizations, and preventive care.. Staff to F/U on mammogram and colonscopy   Patient's desire to return to the community is not present.    Information reviewed:  Medications, vital signs, orders, and nursing notes.    ASSESSMENT:      ICD-10-CM    1. Diabetic peripheral neuropathy associated with type 2 diabetes mellitus (H)  E11.42    2. Depression, unspecified depression type  F32.9    3. Essential hypertension  I10    4. Age-related physical debility  R54        PLAN:       IDDM. FS are stable last A1C 6.9 on 3/18/20 Endocrinologist following , Continue current diabetic medications as above.       HTN. Continue  Lisinpril and metoprolol     Neuropathy.mainly  hands with reports of feet also . On lyrica and gabapentin.     CAD. HX  MI and stent.      S/p Hysterectomy in October   lap sites healed    Staff to follow-up with family to see if she is due for a colonoscopy or a mammogram.       Electronically signed by: Shawna Mesa CNP

## 2021-06-09 NOTE — PROGRESS NOTES
[facility]    Name:   Cristal Preciado  : 1950  Facility:   [facility] [[id number]]   Room: [address]  Code Status: FULL CODE -   Fac type:   NF (Long Term Care, LTC) -     CHIEF COMPLAINT / REASON FOR VISIT:  Chief Complaint   Patient presents with     Review Of Multiple Medical Conditions    Patient was last seen by me on 17.    HPI: Cristal is a 66 y.o.  female who is alert and oriented, residing in this facility due to significant overall debility. She has 5 children, one in particular who has mental issues. She has been seen by psych here and seems to be handling things fairly well, as the family situation has improved somewhat.      She suffers from morbid obesity, but her main diagnoses include diabetes mellitus type II (seen by multiple diabetic specialists), diabetic peripheral neuropathy and nephropathy among other comorbidities, and she is seen by Dr. Ordonez and Lea ARGUELLES ([facility]) every 3 months, most recently yesterday. A hemoglobin A1c in 2016 was 8.0%, up from 7.5% in 2011. Her next endocrinologist visit is on 17.    Adjustments have been made in her prandial NovoLog, and she is now receiving 75 units at breakfast and 72 units at lunch and supper. She also continues on sliding scale, although it is hoped that we can eliminate that with further fine-tuning of her regimen. She also receives Lantus insulin which is now up to 100 units QHS and 100 units QAM and, in addition, she is on Victoza. She goes on outings quite frequently, but we worked out a plan to make sure that she does not miss her insulin dosing. Fasting levels typically range between 98 and 217 (although most are 175 or below). At lunch, the range is between 158 and 264. At supper, the range is between 96 and 221 and, at HS, the range is between 139 and 217.  Overall, blood glucose levels are improved. On a very positive  "note, her hemoglobin A1c on 09/12/16 was back down to 7.5%, and on 10/21/16, it and was down to 7.0%.  We will recheck that now.    She has developed peripheral neuropathy, particularly in the hands and feet (\"they tingle a lot\"), and is on both Lyrica (50 mg TID) and gabapentin (now 1000 mg TID). She has told me she is getting some relief from this, but in no way is this resolving her issues. She has stated in the past that, \"I can pick things up, but it takes me a while.\" She does not have much flex in her fingers. She does have adaptive utensils to allow her to eat independently.     In addition to gabapentin and Lyrica, she also receives 1000 mg of acetaminophen Q6 hours PRN for tingling in the hands (particularly at night). She wears gloves on her hands at night to keep them warm and may wear them during the day as well, as she says they are always cold. She also has orders for PRN tramadol. She has complained of pain in her low back and her side, but denies this today.  She has a history of carpal tunnel syndrome and still has pain in her hands/wrists.  I suggested she might benefit from braces applied while she is in bed, as they seem to hurt mostly at night.  In fact, it is what wakes her up in the morning.  She describes the pain as throbbing and lasting \"for a half hour if I don't take a pain pill for it.\"    She is receiving duloxetine (20 mg QD) for depression but also for her neuropathy, and we did increase the dose from 20 mg to 30 mg daily at my last visit. She has been seen by psych (Siddhartha King, psychologist with [facility]. She does identify sadness, discouragement, and loss of interest. She is noted to have moderate depression (see above) which coincides with current stressors. Her most recent visit with Mr. King occurred on 03/01/17.  She admitted to occasional anxiety when she is not able to do/control things she wants to.    She also has a history of coronary artery " disease (prior MI with stent), and generalized weakness with a history of stroke.     She has hypertension and is on 10 mg of lisinopril daily along with metoprolol tartrate 50 mg BID. Systolic blood pressures range between 113 and 168 with diastolics between 53 and 98. Apical pulse is typically 72 to 96. We're going to increase her metoprolol tartrate to 100 mg BID.     She has gout and had been on 300 mg of allopurinol daily, but this was reduced to 200 mg. A follow-up uric acid level on 03/01/17 was 5.7, and we were able to reduce her allopurinol down to 100 mg.    She has a chronic recurring rash/scaliness of the right ear that tends to bother her into the ear canal. She has been seen by dermatology and is receiving ketoconazole cream on bath day mixed with triamcinolone cream. She also has an order for Elidel 1% cream. The condition has improved significantly.     She visited endocrinology earlier this month at the [facility] and had her nails debrided.  She mentions something about the need for a pelvic exam/ Pap test, and we will see about arranging one.    She is alert and oriented times three. She requires an EZ Stand for transfers but otherwise does not require a lot of assistance from nursing staff. In addition to her peripheral neuropathy, she also has some pain in the right wrist and occasionally in the left. I suspect this is a repetitive injury. It is not constant. No headaches, chest pains, dizziness or dyspnea, nausea or vomiting, dysuria, constipation or diarrhea.    Past Medical History:   Diagnosis Date     Allergic rhinitis      CAD (coronary artery disease)      Carpal tunnel syndrome      Cataract      Chronic kidney disease      Debility      Depression      Diabetic nephropathy      Diabetic neuropathy      GERD (gastroesophageal reflux disease)      Glaucoma      Gout      History of pyelonephritis      HTN (hypertension)      Hyperlipidemia      IDDM (insulin dependent  diabetes mellitus)     Type 2     Lower extremity edema      Myocardial infarction              Family History   Problem Relation Age of Onset     Hypertension Mother      Stroke Mother      Cancer Father      Arthritis Brother      Social History     Social History     Marital status: Single     Spouse name: N/A     Number of children: N/A     Years of education: N/A     Social History Main Topics     Smoking status: Former Smoker     Smokeless tobacco: Not on file     Alcohol use No     Drug use: No     Sexual activity: Not on file     Other Topics Concern     Not on file     Social History Narrative    10/13/2015 - The patient lives at [facility] for [facility] years.     MEDICATIONS: Reviewed from the MAR, physician orders, and earlier progress notes.   Current Outpatient Prescriptions   Medication Sig     acetaminophen (TYLENOL) 500 MG tablet Take 1,000 mg by mouth bedtime. Give at bedtime; also has additional pm orders. For hand pain. Don't exceed 3000mg/24hrs     acetaminophen (TYLENOL) 500 MG tablet Take 1,000 mg by mouth daily. Don't exceed 3000mg/24hrs     acetaminophen (TYLENOL) 500 MG tablet Take 500 mg by mouth every 8 (eight) hours as needed for pain. For pain level 1-5. Don't exceed 3000mg/24hrs     acetaminophen (TYLENOL) 500 MG tablet Take 1,000 mg by mouth every 6 (six) hours as needed for pain. For pain 6-10. Don't exceed 3000mg/24hrs     allopurinol (ZYLOPRIM) 100 MG tablet Take 100 mg by mouth daily.      ammonium lactate (AMLACTIN) 12 % cream Apply 1 application topically as needed for dry skin. Apply to feet     artificial tears,hypromellose, (GENTEAL) 0.3 % Gel Administer 0.25 inches to both eyes every hour as needed.     aspirin 81 mg chewable tablet Chew 81 mg daily.     atorvastatin (LIPITOR) 80 MG tablet Take 80 mg by mouth bedtime.      calcium carbonate-vit D3-min 600 mg calcium- 400 unit Tab Take 1 tablet by mouth 2 (two) times a day.      chlorthalidone (HYGROTEN) 25 MG tablet Take 12.5 mg  by mouth daily.      dorzolamide-timolol (COSOPT) 22.3-6.8 mg/mL ophthalmic solution Administer 1 drop to both eyes 2 (two) times a day.      DULoxetine (CYMBALTA) 30 MG capsule Take 30 mg by mouth daily.     ferrous sulfate 325 (65 FE) MG tablet Take 1 tablet by mouth daily with breakfast.     folic acid (FOLVITE) 1 MG tablet Take 1 mg by mouth daily.     gabapentin (NEURONTIN) 300 MG capsule Take 1,000 mg by mouth 3 (three) times a day.      insulin aspart (NOVOLOG) 100 unit/mL injection Inject 15 Units under the skin as needed. With each snack in addition to sliding scale and lantus     insulin aspart (NOVOLOG) 100 unit/mL injection Inject 72 Units under the skin daily before lunch.      insulin aspart (NOVOLOG) 100 unit/mL injection Inject 72 Units under the skin daily before supper.      insulin aspart (NOVOLOG) 100 unit/mL injection Inject 75 Units under the skin Daily before breakfast.      insulin aspart (NOVOLOG) 100 unit/mL injection Inject under the skin 4 (four) times a day. Sliding scale:   140-180: Give 4 units  181-220: Give 6 units  221-260: Give 8 units  261-300: Give 10  301-340: Give 12 units  341-380: Give 14 units  >381: Give 14 units and call MD     insulin glargine (LANTUS) 100 unit/mL injection Inject 100 Units under the skin 2 (two) times a day.      latanoprost (XALATAN) 0.005 % ophthalmic solution Administer 1 drop to both eyes bedtime.      liraglutide (VICTOZA) 0.6 mg/0.1 mL (18 mg/3 mL) PnIj injection Inject 1.8 mg under the skin daily. Call [phone] Dr. Washington if she develops nausea lasting >3 days.     lisinopril (PRINIVIL,ZESTRIL) 10 MG tablet Take 10 mg by mouth daily. Hold for SBP<110     loratadine (CLARITIN) 10 mg tablet Take 10 mg by mouth daily as needed for allergies.     metoprolol tartrate (LOPRESSOR) 50 MG tablet Take 100 mg by mouth 2 (two) times a day.      mineral oil Drop Administer 3 drops into both ears every morning. Every day shift, apply on Q-tip and rub into  "ears.     mineral oil Drop Administer 4 drops into both ears see administration instructions. Instill 4 drop in both ears BID x4days then irrigate     nitroglycerin (NITROSTAT) 0.4 MG SL tablet Place 0.4 mg under the tongue every 5 (five) minutes as needed for chest pain. 1 tablet SL Q 5 minutes up to 3 doses. Call 911 if chest pain persists.     pregabalin (LYRICA) 25 MG capsule Take 50 mg by mouth 3 (three) times a day.      ranitidine (ZANTAC) 150 MG tablet Take 150 mg by mouth once daily.      senna-docusate (SENNOSIDES-DOCUSATE SODIUM) 8.6-50 mg tablet Take 1 tablet by mouth every other day. And 1 QOD PRN     tolnaftate (ANTIFUNGAL, TOLNAFTATE,) 1 % powder Apply 1 application topically 2 (two) times a day. Apply to feet     traMADol (ULTRAM) 50 mg tablet Take 50 mg by mouth every 8 (eight) hours as needed for pain.     ALLERGIES:   Allergies   Allergen Reactions     Nuts - Unspecified      Unable To Assess      M[place] per [facility] medication review report     Vitals:    03/27/17 1044   BP: 140/68   Pulse: 64   Resp: 18   Temp: 98.7  F (37.1  C)   Weight: (!) 233 lb (105.7 kg)   Height: 5' 3\" (1.6 m)   Stable weight.    EXAMINATION:   General: Obese middle-aged -American female, up in her wheelchair, in no apparent distress.  Head: Normocephalic and atraumatic.   Eyes: PERRLA, sclerae clear. Left eye ptosis. Dysconjugate gaze, with the right eye deviated medially.  ENT: Moist oral mucosa. No rhinorrhea or nasal discharge. Hearing is unimpaired.  Cardiovascular: Regular rate and rhythm. No appreciable murmur today.  Respiratory: Lungs clear to auscultation bilaterally.   Abdomen: Soft and nontender.   Musculoskeletal/Extremities: Bilateral trace pretibial edema.  She is wearing compression hose.  Neurologic: Difficulty with flexion of fingers bilaterally. Peripheral neuropathy in all extremities.  Integument: See Head and HPI.  Cognitive/Psychiatric: Alert and oriented times three. Affect is " euthymic.    DIAGNOSTICS:   Results for orders placed or performed in visit on 02/16/17   Basic Metabolic Panel   Result Value Ref Range    Sodium 138 136 - 145 mmol/L    Potassium 4.2 3.5 - 5.0 mmol/L    Chloride 109 (H) 98 - 107 mmol/L    CO2 22 22 - 31 mmol/L    Anion Gap, Calculation 7 5 - 18 mmol/L    Glucose 171 (H) 70 - 125 mg/dL    Calcium 9.8 8.5 - 10.5 mg/dL    BUN 17 8 - 22 mg/dL    Creatinine 0.97 0.60 - 1.10 mg/dL    GFR MDRD Af Amer >60 >60 mL/min/1.73m2    GFR MDRD Non Af Amer 58 (L) >60 mL/min/1.73m2     Lab Results   Component Value Date    WBC 5.8 10/27/2011    HGB 9.1 (L) 11/16/2016    HCT 25.2 (L) 10/27/2011    MCV 90 10/27/2011     10/27/2011     CrCl cannot be calculated (Patient has no serum creatinine result on file.).  Lab Results   Component Value Date    HGBA1C 7.0 (H) 10/21/2016     ASSESSMENT/Plan:      ICD-10-CM    1. Type 2 diabetes mellitus E11.9    2. Diabetic peripheral neuropathy associated with type 2 diabetes mellitus E11.42    3. Essential hypertension with goal blood pressure less than 140/90 I10    4. CKD (chronic kidney disease), stage 2 (mild) N18.2    5. CAD (coronary artery disease) I25.10    6. Hyperlipidemia E78.5    7. Gout, unspecified cause, unspecified chronicity, unspecified site M10.9    8. Depression F32.9    9. Bilateral lower extremity edema R60.0      CHANGES:  Patient is seen by occupational and/or physical therapy to address possible braces for nighttime use due to carpal tunnel syndrome.    Arrange for gynecologic appointment for pelvic exam/Pap test.    Check hemoglobin A1c.    CARE PLAN:    The care plan has been reviewed and all orders signed. Changes to care plan, if any, as noted. Otherwise, continue care plan of care. Time spent with this patient was approximately 35 minutes with greater than 50% spent in counseling and coordination of care that included extensive care for review of her blood glucose levels and insulin regimen as well as other  issues of concern that we will be addressing.      Electronically signed by: Jesse Vivas, DANIELE

## 2021-06-09 NOTE — PROGRESS NOTES
[facility]    Name:   Cristal Preciado  : 1950  Facility:   [facility] [[id number]]   Room: [address]  Code Status: FULL CODE -   Fac type:   NF (Long Term Care, LTC) -     CHIEF COMPLAINT / REASON FOR VISIT:  Chief Complaint   Patient presents with     Review Of Multiple Medical Conditions    Patient was last seen by me on 11/15/16 and subsequently seen by Dr. Valdez on 17.    HPI: Cristal is a 66 y.o.  female who is alert and oriented, residing in this facility due to significant overall debility. She has 5 children, one in particular who has mental issues. She has been seen by psych here and seems to be handling things fairly well, as the family situation has improved somewhat.    She suffers from morbid obesity, but her main diagnoses include diabetes mellitus type II (seen by multiple diabetic specialists), diabetic peripheral neuropathy and nephropathy among other comorbidities, and she is seen by Dr. Ordonez and Lea ARGUELLES ([facility]) every 3 months, most recently yesterday. A hemoglobin A1c in 2016 was 8.0%, up from 7.5% in 2011. Her next endocrinologist visit is on 17.    Adjustments have been made in her prandial NovoLog, and she is now receiving 75 units at breakfast and 72 units at lunch and supper. She also continues on sliding scale, although it is hoped that we can eliminate that with further fine-tuning of her regimen. She also receives Lantus insulin which is now up to 100 units QHS and 100 units QAM and, in addition, she is on Victoza. She goes on outings quite frequently, but we worked out a plan to make sure that she does not miss her insulin dosing. Fasting levels typically range between 95 and 247 (typically receiving 4 to 8 units sliding-scale). At lunch, the range is between 165 and 323 (typically receiving 4 to 8 units sliding-scale), although blood glucose levels greater  "than 300 are rare. At supper, the range is between 94 and 282 (typically receiving 4 sliding-scale) and, at HS, the range is between 146 and 356 (typically receiving 4 to 6 units sliding-scale). I am inclined to want to raise her prandial doses slightly, but we will reevaluate in my next visit. On a very positive note, her hemoglobin A1c on 09/12/16 was back down to 7.5%, and on 10/21/16, it and was down to 7.0%.     She has developed peripheral neuropathy, particularly in the hands and feet (\"they tingle a lot\"), and is on both Lyrica (50 mg TID) and gabapentin (now 1000 mg TID). She has told me she is getting some relief from this, but in no way is this resolving her issues. She has stated in the past that, \"I can pick things up, but it takes me a while.\" She does not have much flex in her fingers. She does have adaptive utensils to allow her to eat independently. She is receiving duloxetine (20 mg QD) for depression but also for her neuropathy, and we did increase the dose from 20 mg to 30 mg daily at my last visit. She has been seen by psych (Siddhartha King, psychologist with [facility]. She does identify sadness, discouragement, and loss of interest. She is noted to have moderate depression (see above) which coincides with current stressors.    She also has a history of coronary artery disease (prior MI with stent), and generalized weakness with a history of stroke. She has hypertension and is on 10 mg of lisinopril daily along with metoprolol tartrate 50 mg BID. Systolic blood pressures range between 113 and 168 with diastolics between 53 and 98. Apical pulse is typically 72 to 96. We're going to increase her metoprolol tartrate to 100 mg BID.     She has gout and had been on 300 mg of allopurinol daily, but this was reduced to 200 mg. For a pharmacy consult recommendation, we will do a follow-up that uric acid level and possibly be able to reduce her allopurinol down to 100 mg.    In " "addition to gabapentin and Lyrica, she also receives 1000 mg of acetaminophen Q6 hours PRN for tingling in the hands (particularly at night). She wears gloves on her hands at night to keep them warm and may wear them during the day as well, as she says they are always cold. She also has orders for PRN tramadol. She has complained of pain in her low back and her side, but denies this toady. She describes it \"like someone's punching me on my side.\" She has taken the tramadol 16 times over the last month and does have some leeway with this.    She has a chronic recurring rash/scaliness of the right ear that tends to bother her into the ear canal. She has been seen by dermatology and is receiving ketoconazole cream on bath day mixed with triamcinolone cream. She also has an order for Elidel 1% cream. The condition has improved significantly.    She is alert and oriented times three. She requires an EZ Stand for transfers but otherwise does not require a lot of assistance from nursing staff. In addition to her peripheral neuropathy, she also has some pain in the right wrist and occasionally in the left. I suspect this is a repetitive injury. It is not constant. No headaches, chest pains, dizziness or dyspnea, nausea or vomiting, dysuria, constipation or diarrhea.    Past Medical History:   Diagnosis Date     Allergic rhinitis      CAD (coronary artery disease)      Carpal tunnel syndrome      Cataract      Chronic kidney disease      Debility      Depression      Diabetic nephropathy      Diabetic neuropathy      GERD (gastroesophageal reflux disease)      Glaucoma      Gout      History of pyelonephritis      HTN (hypertension)      Hyperlipidemia      IDDM (insulin dependent diabetes mellitus)     Type 2     Lower extremity edema      Myocardial infarction              Family History   Problem Relation Age of Onset     Hypertension Mother      Stroke Mother      Cancer Father      Arthritis Brother      Social History "     Social History     Marital status: Single     Spouse name: N/A     Number of children: N/A     Years of education: N/A     Social History Main Topics     Smoking status: Former Smoker     Smokeless tobacco: Not on file     Alcohol use No     Drug use: No     Sexual activity: Not on file     Other Topics Concern     Not on file     Social History Narrative    10/13/2015 - The patient lives at [facility] for 5 years.     MEDICATIONS: Reviewed from the MAR, physician orders, and earlier progress notes.   Current Outpatient Prescriptions   Medication Sig     acetaminophen (TYLENOL) 500 MG tablet Take 1,000 mg by mouth bedtime. Give at bedtime; also has additional pm orders. For hand pain. Don't exceed 3000mg/24hrs     acetaminophen (TYLENOL) 500 MG tablet Take 1,000 mg by mouth daily. Don't exceed 3000mg/24hrs     acetaminophen (TYLENOL) 500 MG tablet Take 500 mg by mouth every 8 (eight) hours as needed for pain. For pain level 1-5. Don't exceed 3000mg/24hrs     acetaminophen (TYLENOL) 500 MG tablet Take 1,000 mg by mouth every 6 (six) hours as needed for pain. For pain 6-10. Don't exceed 3000mg/24hrs     allopurinol (ZYLOPRIM) 100 MG tablet Take 200 mg by mouth daily.      ammonium lactate (AMLACTIN) 12 % cream Apply 1 application topically as needed for dry skin. Apply to feet     artificial tears,hypromellose, (GENTEAL) 0.3 % Gel Administer 0.25 inches to both eyes every hour as needed.     aspirin 81 mg chewable tablet Chew 81 mg daily.     atorvastatin (LIPITOR) 80 MG tablet Take 80 mg by mouth bedtime.      calcium carbonate-vit D3-min 600 mg calcium- 400 unit Tab Take 1 tablet by mouth 2 (two) times a day.      chlorthalidone (HYGROTEN) 25 MG tablet Take 12.5 mg by mouth daily.      dorzolamide-timolol (COSOPT) 22.3-6.8 mg/mL ophthalmic solution Administer 1 drop to both eyes 2 (two) times a day.      DULoxetine (CYMBALTA) 30 MG capsule Take 30 mg by mouth daily.     ferrous sulfate 325 (65 FE) MG tablet  Take 1 tablet by mouth daily with breakfast.     folic acid (FOLVITE) 1 MG tablet Take 1 mg by mouth daily.     gabapentin (NEURONTIN) 300 MG capsule Take 1,000 mg by mouth 3 (three) times a day.      insulin aspart (NOVOLOG) 100 unit/mL injection Inject 15 Units under the skin as needed. With each snack in addition to sliding scale and lantus     insulin aspart (NOVOLOG) 100 unit/mL injection Inject 72 Units under the skin daily before lunch.      insulin aspart (NOVOLOG) 100 unit/mL injection Inject 72 Units under the skin daily before supper.      insulin aspart (NOVOLOG) 100 unit/mL injection Inject 75 Units under the skin Daily before breakfast.      insulin aspart (NOVOLOG) 100 unit/mL injection Inject under the skin 4 (four) times a day. Sliding scale:   140-180: Give 4 units  181-220: Give 6 units  221-260: Give 8 units  261-300: Give 10  301-340: Give 12 units  341-380: Give 14 units  >381: Give 14 units and call MD     insulin glargine (LANTUS) 100 unit/mL injection Inject 100 Units under the skin 2 (two) times a day.      latanoprost (XALATAN) 0.005 % ophthalmic solution Administer 1 drop to both eyes bedtime.      liraglutide (VICTOZA) 0.6 mg/0.1 mL (18 mg/3 mL) PnIj injection Inject 1.8 mg under the skin daily. Call [phone] Dr. Washington if she develops nausea lasting >3 days.     lisinopril (PRINIVIL,ZESTRIL) 10 MG tablet Take 10 mg by mouth daily. Hold for SBP<110     loratadine (CLARITIN) 10 mg tablet Take 10 mg by mouth daily as needed for allergies.     metoprolol tartrate (LOPRESSOR) 50 MG tablet Take 100 mg by mouth 2 (two) times a day.      mineral oil Drop Administer 3 drops into both ears every morning. Every day shift, apply on Q-tip and rub into ears.     mineral oil Drop Administer 4 drops into both ears see administration instructions. Instill 4 drop in both ears BID x4days then irrigate     nitroglycerin (NITROSTAT) 0.4 MG SL tablet Place 0.4 mg under the tongue every 5 (five) minutes  "as needed for chest pain. 1 tablet SL Q 5 minutes up to 3 doses. Call 911 if chest pain persists.     pregabalin (LYRICA) 25 MG capsule Take 50 mg by mouth 3 (three) times a day.      ranitidine (ZANTAC) 150 MG tablet Take 150 mg by mouth once daily.      senna-docusate (SENNOSIDES-DOCUSATE SODIUM) 8.6-50 mg tablet Take 1 tablet by mouth every other day. And 1 QOD PRN     tolnaftate (ANTIFUNGAL, TOLNAFTATE,) 1 % powder Apply 1 application topically 2 (two) times a day. Apply to feet     traMADol (ULTRAM) 50 mg tablet Take 50 mg by mouth every 8 (eight) hours as needed for pain.     ALLERGIES:   Allergies   Allergen Reactions     Nuts - Unspecified      Unable To Assess      M[place] Dew per [facility] medication review report     Vitals:    02/28/17 1321   BP: 150/89   Pulse: 79   Resp: 20   Temp: 99  F (37.2  C)   Weight: (!) 232 lb 12.8 oz (105.6 kg)   Height: 5' 3\" (1.6 m)   Stable weight.    EXAMINATION:   General: Obese middle-aged -American female, lying in bed, in no apparent distress.  Head: Normocephalic and atraumatic.   Eyes: PERRLA, sclerae clear. Left eye ptosis. Dysconjugate gaze, with the right eye deviated medially.  ENT: Moist oral mucosa. No rhinorrhea or nasal discharge. Hearing is unimpaired.  Cardiovascular: Regular rate and rhythm. No appreciable murmur today.  Respiratory: Lungs clear to auscultation bilaterally.   Abdomen: Soft and nontender.   Musculoskeletal/Extremities: Bilateral trace pretibial edema, improved.   Neurologic: Difficulty with flexion of fingers bilaterally. Peripheral neuropathy in all extremities.  Integument: See Head and HPI.  Cognitive/Psychiatric: Alert and oriented times three. Affect is euthymic.    DIAGNOSTICS:   Results for orders placed or performed in visit on 02/16/17   Basic Metabolic Panel   Result Value Ref Range    Sodium 138 136 - 145 mmol/L    Potassium 4.2 3.5 - 5.0 mmol/L    Chloride 109 (H) 98 - 107 mmol/L    CO2 22 22 - 31 mmol/L    Anion " Gap, Calculation 7 5 - 18 mmol/L    Glucose 171 (H) 70 - 125 mg/dL    Calcium 9.8 8.5 - 10.5 mg/dL    BUN 17 8 - 22 mg/dL    Creatinine 0.97 0.60 - 1.10 mg/dL    GFR MDRD Af Amer >60 >60 mL/min/1.73m2    GFR MDRD Non Af Amer 58 (L) >60 mL/min/1.73m2     Lab Results   Component Value Date    WBC 5.8 10/27/2011    HGB 9.1 (L) 11/16/2016    HCT 25.2 (L) 10/27/2011    MCV 90 10/27/2011     10/27/2011     CrCl cannot be calculated (Patient has no serum creatinine result on file.).  Lab Results   Component Value Date    HGBA1C 7.0 (H) 10/21/2016     ASSESSMENT/Plan:      ICD-10-CM    1. Diabetic peripheral neuropathy associated with type 2 diabetes mellitus E11.42    2. Type 2 diabetes mellitus E11.9    3. Essential hypertension with goal blood pressure less than 140/90 I10    4. Hyperlipidemia E78.5    5. Gout, unspecified cause, unspecified chronicity, unspecified site M10.9    6. CKD (chronic kidney disease), stage 2 (mild) N18.2    7. CAD (coronary artery disease) I25.10    8. Bilateral lower extremity edema R60.0    9. Depression F32.9      CHANGES:  Increase metoprolol tartrate from 50 mg BID to 100 mg BID.    Check uric acid level.    CARE PLAN:    The care plan has been reviewed and all orders signed. Changes to care plan, if any, as noted. Otherwise, continue care plan of care. Time spent with this patient was approximately 35 minutes with weight is and 50% spent in counseling and coordination of care that included extensive care for review of her blood glucose levels and insulin regimen.      Electronically signed by: Jesse Vivas CNP

## 2021-06-09 NOTE — TELEPHONE ENCOUNTER
[facility] Nurse Triage Telephone Note      Provider: DANYELLE Wesley  Facility: [facility] Type: LT    Caller: Rosalva  Call Back Number:  [phone]    Allergies: Nuts - unspecified and Unable to assess    Reason for call: Nurse calling to request an order to d/c patient's jobst stockings.  Jobst were ordered per dermatology in 2015 due to dermatitis, which has since cleared.  Patient only has trace edema.       Verbal Order/Direction given by Provider: CAMRON jobst stockings.      Provider giving order: DANYELLE Wesley    Verbal order given to: Rosalva Vora RN

## 2021-06-10 ENCOUNTER — MEDICAL CORRESPONDENCE (OUTPATIENT)
Dept: HEALTH INFORMATION MANAGEMENT | Facility: CLINIC | Age: 71
End: 2021-06-10

## 2021-06-10 NOTE — PROGRESS NOTES
[facility]    Facility:   [facility] [[id number]]   Code Status: FULL CODE       Chief Complaint   Patient presents with     Review Of Multiple Medical Conditions     [facility] 7/30/2020. IDDM, HTN, chronic pain.        HPI:   Cristal is a 70 y.o. female seen for routine physician follow up in LT at [facility]. She has been a resident here since October 2011. She does have multiple complex co morbidities. She is treated for hypertension and has insulin-dependent diabetes mellitus. She sees an endocrinologist. She has chronic neuropathy, chronic kidney disease and is treated with Cymbalta for depression and chronic pain. She is on gabapentin and lyrica for neuropathy pain. She has chronic weakness in her lower extremities and is wheelchair bound. She has chronic urinary incontinence. Hospitalized in April 2018 for chest pain. It is noted she has coronary artery disease having had PCI with stent placement in 2009. Her chest pain was reproducible on exam. Negative 3 sets of troponin. EKG showed no significant ischemic changes. Pharmacological stress was negative for inducible ischemia so no intervention was required. She has periodic follow up with cardiology. She was admitted to the [facility] on 8/5/19 for planned hysterectomy. She had been experiencing brownish vaginal discharge, referred to gynecology with endometrial biopsy showing atypia. Her hospital course was uneventful, she underwent davinci assisted total laparoscopic hysterectomy with bilateral salping oophorectomy. She returned to [facility] on 8/6/19.    Today:  Cristal has been stable. No new concerns per nursing. She has not had any med changes. Continues with insulin for diabetes. Per charting, is usually pleasant though occ demanding or yells out. She is wheelchair bound, does not ambulate. She needs assist with cares due to neuropathy and difficulty using her hands. She has not been ill recently with  cough cold or congestion. No fever. BPs satisfactory, treated with meds for HTN.      Past Medical History:  Past Medical History:   Diagnosis Date     Acute cystitis with hematuria      Allergic rhinitis      CAD (coronary artery disease)      Carpal tunnel syndrome      Cataract      Cerebral vascular accident (H)      Chronic kidney disease      Debility      Depression      Diabetes mellitus, type II (H)      Diabetic nephropathy (H)      GERD (gastroesophageal reflux disease)      Glaucoma      Gout      History of pyelonephritis      HTN (hypertension)      Hyperlipidemia      IDDM (insulin dependent diabetes mellitus) (H)     Type 2     Lower extremity edema      Myocardial infarction (H)        Medications:  Current Outpatient Medications   Medication Sig     acetaminophen (TYLENOL) 500 MG tablet Take 1,000 mg by mouth 3 (three) times a day .           aspirin 81 mg chewable tablet Chew 81 mg daily.     atorvastatin (LIPITOR) 80 MG tablet Take 80 mg by mouth bedtime.      carboxymethylcellulose (REFRESH PLUS) 0.5 % Dpet ophthalmic dropperette Administer 1 drop to both eyes 3 (three) times a day.     chlorthalidone (HYGROTEN) 25 MG tablet Take 25 mg by mouth daily.      dorzolamide-timolol (COSOPT) 22.3-6.8 mg/mL ophthalmic solution Administer 1 drop to both eyes 2 (two) times a day.      DULoxetine (CYMBALTA) 60 MG capsule Take 90 mg by mouth daily.      ferrous sulfate 325 (65 FE) MG tablet Take 1 tablet by mouth daily with breakfast.     folic acid (FOLVITE) 1 MG tablet Take 1 mg by mouth daily.     gabapentin (NEURONTIN) 300 MG capsule Take 300 mg by mouth 3 (three) times a day.      insulin glargine U-300 conc (TOUJEO MAX U-300 SOLOSTAR) 300 unit/mL (3 mL) InPn Inject 90 Units under the skin daily. 2 injections of 45 units from pen     ketoconazole (NIZORAL) 2 % shampoo Apply 1 application topically 2 (two) times a week Apply to damp skin, lather, leave on 5 minutes, and rinse. Apply on Wednesdays and  Saturdays..           latanoprost (XALATAN) 0.005 % ophthalmic solution Administer 1 drop to both eyes bedtime.      liraglutide (VICTOZA) 0.6 mg/0.1 mL (18 mg/3 mL) PnIj injection Inject 1.8 mg under the skin daily. Call [phone] Dr. Washington if she develops nausea lasting >3 days.     loratadine (CLARITIN) 10 mg tablet Take 10 mg by mouth daily as needed for allergies.     LYRICA 75 mg capsule TAKE 1 CAP BY MOUTH THREE TIMES DAILY     metFORMIN (GLUCOPHAGE-XR) 500 MG 24 hr tablet Take 1,000 mg by mouth daily.     metoprolol tartrate (LOPRESSOR) 100 MG tablet Take 100 mg by mouth 2 (two) times a day.     nitroglycerin (NITROSTAT) 0.4 MG SL tablet Place 0.4 mg under the tongue every 5 (five) minutes as needed for chest pain. 1 tablet SL Q 5 minutes up to 3 doses. Call [phone] if chest pain persists.     olopatadine 0.2 % Drop Apply 1 drop to eye daily as needed.     omeprazole (PRILOSEC) 20 MG capsule Take 20 mg by mouth 2 (two) times a day before meals.      senna-docusate (SENNOSIDES-DOCUSATE SODIUM) 8.6-50 mg tablet Take 2 tablets by mouth 2 (two) times a day as needed for constipation.            simethicone (MYLICON) 80 MG chewable tablet Chew 80 mg 4 (four) times a day. After meals and at HS     traMADol (ULTRAM) 50 mg tablet Take 1 tablet (50 mg total) by mouth 3 (three) times a day as needed for pain. For pain 6 or higher     triamcinolone (KENALOG) 0.1 % ointment Apply 1 application topically daily. Apply to legs in the morning  And to right ear two times a day prn             Physical Exam:   Note: COVID-19 pandemic precautions in place. Physical exam performed with social distancing considerations.  General: Patient is alert female, no distress.   Vitals: /64, Temp 98.3, Pulse 71, RR 18, O2 sat 98% RA.  HEENT: Head is NCAT. Eyes show no injection or icterus. Nares negative. Oropharynx well hydrated.  Back: No spinal or CVA tenderness.  : Deferred.  Extremities: Mild LE edema is  noted.  Musculoskeletal: Deformities small joints hands.  Psych: Mood appears good.      Labs:  Lab Results   Component Value Date    HGBA1C 7.0 (H) 01/10/2019     Lab Results   Component Value Date    HGBA1C 6.9 (H) 03/18/2020     Lab Results   Component Value Date    WBC 4.3 09/13/2019    HGB 9.8 (L) 09/13/2019    HCT 31.3 (L) 09/13/2019     (H) 09/13/2019     09/13/2019     Results for orders placed or performed in visit on 09/27/19   Basic Metabolic Panel   Result Value Ref Range    Sodium 134 (L) 136 - 145 mmol/L    Potassium 4.1 3.5 - 5.0 mmol/L    Chloride 105 98 - 107 mmol/L    CO2 20 (L) 22 - 31 mmol/L    Anion Gap, Calculation 9 5 - 18 mmol/L    Glucose 107 70 - 125 mg/dL    Calcium 9.9 8.5 - 10.5 mg/dL    BUN 25 (H) 8 - 22 mg/dL    Creatinine 1.22 (H) 0.60 - 1.10 mg/dL    GFR MDRD Af Amer 53 (L) >60 mL/min/1.73m2    GFR MDRD Non Af Amer 44 (L) >60 mL/min/1.73m2         Assessment/Plan:  1. IDDM. On insulin toujeo, victoza, and metformin. Continue to follow accuchecks. She sees endocrinology. Last A1c good at 6.9%.  2. HTN. She is treated with metoprolol and chlorthalidone. Overall BPs acceptable, no need for changes.   3. Neuropathy. Chronic pain controlled with gabapentin, lyrica and Cymbalta.  4. Hx of stroke. Wheelchair bound, non ambulatory. On aspirin.  5. Glaucoma. Visual impairment at baseline.  6. Hx CVA. Continue aspirin.  7. Depression. Continue cymbalta.  8. CKD. Labs as noted above.  9. Anemia. Last hgb at 9.8. Check periodically.          Electronically signed by: Meagan Valdez MD

## 2021-06-10 NOTE — PROGRESS NOTES
[facility]    Name:   Cristal Preciado  : 1950  Facility:   [facility] [[id number]]   Room: [address]  Code Status: FULL CODE -   Fac type:   NF (Long Term Care, LTC) -     CHIEF COMPLAINT / REASON FOR VISIT:  Chief Complaint   Patient presents with     Review Of Multiple Medical Conditions    Patient was last seen by me on 17 and subsequently seen by Dr. Valdez on 17.    HPI: Cristal is a 67 y.o.  female who is alert and oriented, residing in this facility due to significant overall debility. She has 5 children, one in particular who has mental issues. She has been seen by psych here and (most recently on 05/10/17) and diagnosed with major depressive disorder (recurrent episode, moderate), mild vascular neurocognitive disorder, and adjustment disorder with anxiety.  Over the last few years, she has had family issues she has had to deal with.    She suffers from morbid obesity, but her main diagnoses include diabetes mellitus type II (seen by multiple diabetic specialists), diabetic peripheral neuropathy and nephropathy among other comorbidities, and she is seen by Dr. Ordonez and Lea ARGUELLES ([facility]) every 3 months, most recently yesterday. A hemoglobin A1c in 2016 was 8.0%, up from 7.5% in 2011.  Most recently, with careful management, we have gotten it down to the low 7s.    She is on Victoza and insulin. Currently, she is receiving 100 units of Lantus insulin in the morning and at bedtime.  Adjustments have been made in her prandial NovoLog, and she is now receiving 75 units at breakfast and 66 units at lunch, and 66 units at supper, the latter to being decreased from 72 units on 17. She also continues on sliding scale, although it is hoped that we can eliminate that with further fine-tuning of her regimen. She goes on outings quite frequently, but we worked out a plan to make sure that  "she does not miss her insulin dosing. Fasting levels typically range between 151 and 253 (somewhat higher than previously). At lunch, the range is between 188 and 338 (a good deal higher than previously). At supper, the range is between 134 and 281 (also elevated) and, at HS, the range is between 150 and 326.  Overall, blood glucose levels have worsened, and we will be bumping her breakfast NovoLog from 75 units to 76 units, lunch dosing from 66 units to 68 units, and supper dosing from 66 units to 68 units.     She has developed peripheral neuropathy, particularly in the hands and feet (\"they tingle a lot\"), and is on both Lyrica (50 mg TID) and gabapentin (now 1000 mg TID). She has told me she is getting some relief from this, but in no way is this resolving her issues. She has stated in the past that, \"I can pick things up, but it takes me a while.\" She does not have much flex in her fingers. She does have adaptive utensils to allow her to eat independently.     In addition to gabapentin and Lyrica, she also receives 1000 mg of acetaminophen Q6 hours PRN for tingling in the hands (particularly at night). She wears gloves on her hands at night to keep them warm and may wear them during the day as well, as she says they are always cold. She also has orders for PRN tramadol. She has complained of pain in her low back and her side, but denies this today.  She has a history of carpal tunnel syndrome and still has pain in her hands/wrists.  I suggested she might benefit from braces applied while she is in bed, as they seem to hurt mostly at night.  In fact, it is what wakes her up in the morning.  She describes the pain as throbbing and lasting \"for a half hour if I don't take a pain pill for it.\"    She is receiving duloxetine (20 mg QD) for depression but also for her neuropathy, and we did increase the dose from 20 mg to 30 mg daily at my last visit. She has been seen by psych (Siddhartha King, psychologist with " [facility]), as noted above.     She also has a history of coronary artery disease (prior MI with stent), and generalized weakness with a history of stroke.  She is followed at the [facility], most recently on 05/05/17.    She has hypertension and is on 10 mg of lisinopril daily along with metoprolol tartrate 50 mg BID. Systolic blood pressures range between 113 and 168 with diastolics between 53 and 98. Apical pulse is typically 72 to 96. We're going to increase her metoprolol tartrate to 100 mg BID.     She has gout and had been on 300 mg of allopurinol daily, but this was reduced to 200 mg. A follow-up uric acid level on 03/01/17 was 5.7, and we were then able to reduce her allopurinol down to 100 mg.    She has a chronic recurring rash/scaliness of the right ear that tends to bother her into the ear canal. She has been seen by dermatology and is receiving ketoconazole cream on bath day mixed with triamcinolone cream. She also has an order for Elidel 1% cream. The condition has improved significantly.     She is alert and oriented times three. She requires an EZ Stand for transfers but otherwise does not require a lot of assistance from nursing staff. In addition to her peripheral neuropathy, she also has some pain in the right wrist and occasionally in the left. I suspect this is a repetitive injury. It is not constant. No headaches, chest pains, dizziness or dyspnea, nausea or vomiting, dysuria, constipation or diarrhea.    Past Medical History:   Diagnosis Date     Allergic rhinitis      CAD (coronary artery disease)      Carpal tunnel syndrome      Cataract      Chronic kidney disease      Debility      Depression      Diabetic nephropathy      Diabetic neuropathy      GERD (gastroesophageal reflux disease)      Glaucoma      Gout      History of pyelonephritis      HTN (hypertension)      Hyperlipidemia      IDDM (insulin dependent diabetes mellitus)     Type 2      Lower extremity edema      Myocardial infarction              Family History   Problem Relation Age of Onset     Hypertension Mother      Stroke Mother      Cancer Father      Arthritis Brother      Social History     Social History     Marital status: Single     Spouse name: N/A     Number of children: N/A     Years of education: N/A     Social History Main Topics     Smoking status: Former Smoker     Smokeless tobacco: Not on file     Alcohol use No     Drug use: No     Sexual activity: Not on file     Other Topics Concern     Not on file     Social History Narrative    10/13/2015 - The patient lives at [facility] for 5 years.     MEDICATIONS: Reviewed from the MAR, physician orders, and earlier progress notes.  Updated by me today (05/22/17) with adjustments in insulin reflected below.  Current Outpatient Prescriptions   Medication Sig     acetaminophen (TYLENOL) 500 MG tablet Take 1,000 mg by mouth bedtime. Give at bedtime; also has additional pm orders. For hand pain. Don't exceed 3000mg/24hrs     acetaminophen (TYLENOL) 500 MG tablet Take 1,000 mg by mouth daily. Don't exceed 3000mg/24hrs     acetaminophen (TYLENOL) 500 MG tablet Take 500 mg by mouth every 8 (eight) hours as needed for pain. For pain level 1-5. Don't exceed 3000mg/24hrs     acetaminophen (TYLENOL) 500 MG tablet Take 1,000 mg by mouth every 6 (six) hours as needed for pain. For pain 6-10. Don't exceed 3000mg/24hrs     allopurinol (ZYLOPRIM) 100 MG tablet Take 100 mg by mouth daily.      ammonium lactate (AMLACTIN) 12 % cream Apply 1 application topically as needed for dry skin. Apply to feet     artificial tears,hypromellose, (GENTEAL) 0.3 % Gel Administer 0.25 inches to both eyes every hour as needed.     aspirin 81 mg chewable tablet Chew 81 mg daily.     atorvastatin (LIPITOR) 80 MG tablet Take 80 mg by mouth bedtime.      calcium carbonate-vit D3-min 600 mg calcium- 400 unit Tab Take 1 tablet by mouth 2 (two) times a day.       chlorthalidone (HYGROTEN) 25 MG tablet Take 12.5 mg by mouth daily.      dorzolamide-timolol (COSOPT) 22.3-6.8 mg/mL ophthalmic solution Administer 1 drop to both eyes 2 (two) times a day.      DULoxetine (CYMBALTA) 30 MG capsule Take 30 mg by mouth daily.     ferrous sulfate 325 (65 FE) MG tablet Take 1 tablet by mouth daily with breakfast.     folic acid (FOLVITE) 1 MG tablet Take 1 mg by mouth daily.     gabapentin (NEURONTIN) 300 MG capsule Take 1,000 mg by mouth 3 (three) times a day.      insulin aspart (NOVOLOG) 100 unit/mL injection Inject 15 Units under the skin as needed. With each snack in addition to sliding scale and lantus     insulin aspart (NOVOLOG) 100 unit/mL injection Inject 76 Units under the skin Daily before breakfast.      insulin aspart (NOVOLOG) 100 unit/mL injection Inject under the skin 4 (four) times a day. Sliding scale:   140-180: Give 4 units  181-220: Give 6 units  221-260: Give 8 units  261-300: Give 10  301-340: Give 12 units  341-380: Give 14 units  >381: Give 14 units and call MD     insulin aspart (NOVOLOG) 100 unit/mL injection Inject 68 Units under the skin daily before lunch.      insulin aspart (NOVOLOG) 100 unit/mL injection Inject 68 Units under the skin daily before supper.      insulin glargine (LANTUS) 100 unit/mL injection Inject 100 Units under the skin 2 (two) times a day.      latanoprost (XALATAN) 0.005 % ophthalmic solution Administer 1 drop to both eyes bedtime.      liraglutide (VICTOZA) 0.6 mg/0.1 mL (18 mg/3 mL) PnIj injection Inject 1.8 mg under the skin daily. Call [phone] Dr. Washington if she develops nausea lasting >3 days.     lisinopril (PRINIVIL,ZESTRIL) 10 MG tablet Take 10 mg by mouth daily. Hold for SBP<110     loratadine (CLARITIN) 10 mg tablet Take 10 mg by mouth daily as needed for allergies.     metoprolol tartrate (LOPRESSOR) 50 MG tablet Take 100 mg by mouth 2 (two) times a day.      mineral oil Drop Administer 3 drops into both ears every  "morning. Every day shift, apply on Q-tip and rub into ears.     mineral oil Drop Administer 4 drops into both ears see administration instructions. Instill 4 drop in both ears BID x4days then irrigate     nitroglycerin (NITROSTAT) 0.4 MG SL tablet Place 0.4 mg under the tongue every 5 (five) minutes as needed for chest pain. 1 tablet SL Q 5 minutes up to 3 doses. Call 911 if chest pain persists.     pregabalin (LYRICA) 25 MG capsule Take 50 mg by mouth 3 (three) times a day.      ranitidine (ZANTAC) 150 MG tablet Take 150 mg by mouth once daily.      senna-docusate (SENNOSIDES-DOCUSATE SODIUM) 8.6-50 mg tablet Take 1 tablet by mouth every other day. And 1 QOD PRN     tolnaftate (ANTIFUNGAL, TOLNAFTATE,) 1 % powder Apply 1 application topically 2 (two) times a day. Apply to feet     traMADol (ULTRAM) 50 mg tablet Take 50 mg by mouth every 8 (eight) hours as needed for pain.     ALLERGIES:   Allergies   Allergen Reactions     Nuts - Unspecified      Unable To Assess      Mountain Dew per [facility] medication review report     Vitals:    05/22/17 1411   BP: 135/70   Pulse: 76   Resp: 20   Temp: 98.6  F (37  C)   Weight: (!) 234 lb 9.6 oz (106.4 kg)   Height: 5' 3\" (1.6 m)   Stable weight.    EXAMINATION:   General: Obese middle-aged -American female, up in her wheelchair, in no apparent distress.  Head: Normocephalic and atraumatic.   Eyes: PERRLA, sclerae clear. Left eye ptosis. Dysconjugate gaze, with the right eye deviated medially.  ENT: Moist oral mucosa. No rhinorrhea or nasal discharge. Hearing is unimpaired.  Cardiovascular: Regular rate and rhythm.  2/6 systolic ejection murmur at the left sternal border.  Respiratory: Lungs clear to auscultation bilaterally.   Abdomen: Soft and nontender.   Musculoskeletal/Extremities: Bilateral trace pedal and pretibial edema.  She is wearing compression hose.  Neurologic: Difficulty with flexion of fingers bilaterally. Peripheral neuropathy in all " extremities.  Integument: See Head and HPI.  Cognitive/Psychiatric: Alert and oriented times three. Affect is euthymic.    DIAGNOSTICS:   Results for orders placed or performed in visit on 02/16/17   Basic Metabolic Panel   Result Value Ref Range    Sodium 138 136 - 145 mmol/L    Potassium 4.2 3.5 - 5.0 mmol/L    Chloride 109 (H) 98 - 107 mmol/L    CO2 22 22 - 31 mmol/L    Anion Gap, Calculation 7 5 - 18 mmol/L    Glucose 171 (H) 70 - 125 mg/dL    Calcium 9.8 8.5 - 10.5 mg/dL    BUN 17 8 - 22 mg/dL    Creatinine 0.97 0.60 - 1.10 mg/dL    GFR MDRD Af Amer >60 >60 mL/min/1.73m2    GFR MDRD Non Af Amer 58 (L) >60 mL/min/1.73m2     Lab Results   Component Value Date    WBC 5.8 10/27/2011    HGB 9.1 (L) 11/16/2016    HCT 25.2 (L) 10/27/2011    MCV 90 10/27/2011     10/27/2011     CrCl cannot be calculated (Patient's most recent sCr result is older than the maximum 5 days allowed.).  Lab Results   Component Value Date    HGBA1C 7.3 (H) 03/29/2017     ASSESSMENT/Plan:      ICD-10-CM    1. Type 2 diabetes mellitus E11.9    2. Diabetic peripheral neuropathy associated with type 2 diabetes mellitus E11.42    3. Depression F32.9    4. Essential hypertension with goal blood pressure less than 140/90 I10    5. CKD (chronic kidney disease), stage 2 (mild) N18.2    6. Obesity E66.9    7. CAD (coronary artery disease) I25.10    8. Bilateral lower extremity edema R60.0    9. Hyperlipidemia E78.5    10. Gout, unspecified cause, unspecified chronicity, unspecified site M10.9    11. Atopic dermatitis, unspecified type L20.9      CHANGES:  Increase NovoLog insulin from the 75 units to 76 units at breakfast, from 66 units to 68 units at lunch, and from 66 units to 68 units at supper.    CARE PLAN:    The care plan has been reviewed and all orders signed. Changes to care plan, if any, as noted. Otherwise, continue care plan of care. Time spent with this patient was approximately 35 minutes with greater than 50% spent in counseling  and coordination of care that included extensive review of her blood glucose levels and insulin regimen.      Electronically signed by: Jesse Vivas CNP

## 2021-06-10 NOTE — TELEPHONE ENCOUNTER
[facility] Nurse Triage Telephone Note      Provider: DANYELLE Wesley  Facility: [facility] Type: LTC    Caller:  Fax from [facility] recommendation    Call Back Number:  [phone]    Allergies: Nuts - unspecified and Unable to assess    Reason for call: Due to on B-12 & Metformin, recommend check B-12 level as none done recently.     Verbal Order/Direction given by Provider: Ok check B-12 level next lab day.    Provider giving order: DANYELLE Wesley    Verbal order given to: Evie Reis RN

## 2021-06-10 NOTE — PROGRESS NOTES
[facility]    Facility:   [facility] [[id number]]   Code Status: FULL CODE       Chief Complaint   Patient presents with     Review Of Multiple Medical Conditions       HPI:  Cristal Preciado is a 66-year-old female being seen for routine physician follow-up in the long-term care facility [facility]. She has been a resident here since October 2011 but we took over her in-house care as of September 2015. She does have multiple complex co morbidities. She is treated for hypertension and has insulin-dependent diabetes mellitus. She has a endocrinologist who follows her closely. She has chronic neuropathy, chronic kidney disease and is treated with Cymbalta for depression.    Generally she has been doing well, stable. She is non ambulatory. She is on gabapentin and lyrica for neuropathy pain. She is on meds for depression.  Does require a lift for transfers. She has chronic weakness in her lower extremities. She has not been ill recently with any cough, cold or congestion. She has a good appetite. She denies headache, nausea, vomiting, chest pain, shortness of breath. No change in bowel or bladder habits. No open areas on her skin. No new concerns.      MEDICATIONS:  Current Outpatient Prescriptions   Medication Sig     acetaminophen (TYLENOL) 500 MG tablet Take 1,000 mg by mouth bedtime. Give at bedtime; also has additional pm orders. For hand pain. Don't exceed 3000mg/24hrs     acetaminophen (TYLENOL) 500 MG tablet Take 1,000 mg by mouth daily. Don't exceed 3000mg/24hrs     acetaminophen (TYLENOL) 500 MG tablet Take 500 mg by mouth every 8 (eight) hours as needed for pain. For pain level 1-5. Don't exceed 3000mg/24hrs     acetaminophen (TYLENOL) 500 MG tablet Take 1,000 mg by mouth every 6 (six) hours as needed for pain. For pain 6-10. Don't exceed 3000mg/24hrs     allopurinol (ZYLOPRIM) 100 MG tablet Take 100 mg by mouth daily.      ammonium lactate  (AMLACTIN) 12 % cream Apply 1 application topically as needed for dry skin. Apply to feet     artificial tears,hypromellose, (GENTEAL) 0.3 % Gel Administer 0.25 inches to both eyes every hour as needed.     aspirin 81 mg chewable tablet Chew 81 mg daily.     atorvastatin (LIPITOR) 80 MG tablet Take 80 mg by mouth bedtime.      calcium carbonate-vit D3-min 600 mg calcium- 400 unit Tab Take 1 tablet by mouth 2 (two) times a day.      chlorthalidone (HYGROTEN) 25 MG tablet Take 12.5 mg by mouth daily.      dorzolamide-timolol (COSOPT) 22.3-6.8 mg/mL ophthalmic solution Administer 1 drop to both eyes 2 (two) times a day.      DULoxetine (CYMBALTA) 30 MG capsule Take 30 mg by mouth daily.     ferrous sulfate 325 (65 FE) MG tablet Take 1 tablet by mouth daily with breakfast.     folic acid (FOLVITE) 1 MG tablet Take 1 mg by mouth daily.     gabapentin (NEURONTIN) 300 MG capsule Take 1,000 mg by mouth 3 (three) times a day.      insulin aspart (NOVOLOG) 100 unit/mL injection Inject 15 Units under the skin as needed. With each snack in addition to sliding scale and lantus     insulin aspart (NOVOLOG) 100 unit/mL injection Inject 72 Units under the skin daily before lunch.      insulin aspart (NOVOLOG) 100 unit/mL injection Inject 72 Units under the skin daily before supper.      insulin aspart (NOVOLOG) 100 unit/mL injection Inject 75 Units under the skin Daily before breakfast.      insulin aspart (NOVOLOG) 100 unit/mL injection Inject under the skin 4 (four) times a day. Sliding scale:   140-180: Give 4 units  181-220: Give 6 units  221-260: Give 8 units  261-300: Give 10  301-340: Give 12 units  341-380: Give 14 units  >381: Give 14 units and call MD     insulin glargine (LANTUS) 100 unit/mL injection Inject 100 Units under the skin 2 (two) times a day.      latanoprost (XALATAN) 0.005 % ophthalmic solution Administer 1 drop to both eyes bedtime.      liraglutide (VICTOZA) 0.6 mg/0.1 mL (18 mg/3 mL) PnIj injection Inject  1.8 mg under the skin daily. Call [phone] Dr. Washington if she develops nausea lasting >3 days.     lisinopril (PRINIVIL,ZESTRIL) 10 MG tablet Take 10 mg by mouth daily. Hold for SBP<110     loratadine (CLARITIN) 10 mg tablet Take 10 mg by mouth daily as needed for allergies.     metoprolol tartrate (LOPRESSOR) 50 MG tablet Take 100 mg by mouth 2 (two) times a day.      mineral oil Drop Administer 3 drops into both ears every morning. Every day shift, apply on Q-tip and rub into ears.     mineral oil Drop Administer 4 drops into both ears see administration instructions. Instill 4 drop in both ears BID x4days then irrigate     nitroglycerin (NITROSTAT) 0.4 MG SL tablet Place 0.4 mg under the tongue every 5 (five) minutes as needed for chest pain. 1 tablet SL Q 5 minutes up to 3 doses. Call [phone] if chest pain persists.     pregabalin (LYRICA) 25 MG capsule Take 50 mg by mouth 3 (three) times a day.      ranitidine (ZANTAC) 150 MG tablet Take 150 mg by mouth once daily.      senna-docusate (SENNOSIDES-DOCUSATE SODIUM) 8.6-50 mg tablet Take 1 tablet by mouth every other day. And 1 QOD PRN     tolnaftate (ANTIFUNGAL, TOLNAFTATE,) 1 % powder Apply 1 application topically 2 (two) times a day. Apply to feet     traMADol (ULTRAM) 50 mg tablet Take 50 mg by mouth every 8 (eight) hours as needed for pain.       PHYSICAL EXAM:  Gen.: Patient is alert, pleasant, female, sitting in a wheelchair, no distress.  HEENT: Eyes show no conjunctival injection or icterus. Nares negative. Oropharynx moist.  Lungs: Clear. No wheezes.  Cardiovascular: Regular rate and rhythm, normal S1 and S2.  Abdomen: Obese, soft, non tender.  Back: No reproducible tenderness. No bruising noted.  Extremities: She does have moderate lower extremity edema, is wearing Jobst stockings.  Musculoskeletal: Degenerative changes noted.      DIAGNOSTICS:  10/20/15: sodium 138, potassium 4.0, BUN 19, creatinine 0.86, GFR greater than 60.  9/12/16: Hgb A1C  7.5%.      ASSESSMENT/PLAN:  1. IDDM. Monitor and adjust insulin as needed.  2. HTN. Stable.  3. Neuropathy. Cont meds.  4. CAD. Hx MI and stent. Refer to cardiology.  5. Chronic kidney disease.  6. Depression.         Electronically signed by: Meagan Valdez MD

## 2021-06-11 NOTE — PROGRESS NOTES
[facility]    Name:   Cristal Preciado  : 1950  Facility:   [facility] [[id number]]   Room: [address]  Code Status: FULL CODE -   Fac type:   NF (Long Term Care, LTC) -     CHIEF COMPLAINT / REASON FOR VISIT:  Chief Complaint   Patient presents with     Review Of Multiple Medical Conditions    Patient was last seen by Dr. Valdez on 17 and subsequently by me on 17.    HPI: Cristal is a 67 y.o.  female who is alert and oriented, residing in this facility due to significant overall debility. She has 5 children, one in particular who has mental issues. Today she also stated that one of her daughters has MS as well.  I am unable to validate this statement today. She has been seen by psych here and (most recently on 05/10/17) and diagnosed with major depressive disorder (recurrent episode, moderate), mild vascular neurocognitive disorder, and adjustment disorder with anxiety.  Over the last few years, she has had family issues she has had to deal with.  She is stable without mood stabilizer.    She suffers from morbid obesity, but her main diagnoses include diabetes mellitus type II (seen by multiple diabetic specialists), diabetic peripheral neuropathy and nephropathy among other comorbidities, and she is seen by Dr. Ordonez and Lea ARGUELLES ([facility]) every 3 months, last 17. No change noted per visit. A hemoglobin A1c in 2016 was 8.0%, up from 7.5% in 2011.  Most recently, with careful management, we have gotten it down her Hgb A1C to 7.3 on 3/27/17.    She is on Victoza and insulin. Currently, she is receiving 100 units of Lantus insulin in the morning and at bedtime.  Adjustments have been made in her prandial NovoLog, and she is now receiving 76 units at breakfast and 68 units at lunch, and 68 units at supper. She also continues on sliding scale, which will be d/c'd with additional supplementation  "during the TID dosing timeframes. She goes on outings quite frequently, but we worked out a plan to make sure that she does not miss her insulin dosing. Fasting levels typically range between 177 to 227. At lunch, the range is between 207-282. At supper, the range is between 155-254 and, at HS, the range is between 163-254.  Overall, blood glucose levels have become better.  I am going to discontinue the SS and increase the corresponding dosing regiment as follows: bumping her breakfast NovoLog from 76 units to 82 units, lunch dosing from 68 units to 76 units, and supper dosing from 68 units to 74 units.     She has developed peripheral neuropathy, particularly in the hands and feet (\"they tingle a lot\"), and is on both Lyrica (50 mg TID) and gabapentin (now 1000 mg TID). She has told me she is getting some relief from this, but in no way is this resolving her issues. She has stated in the past that, \"I can pick things up, but it takes me a while.\" She does not have much flex in her fingers. She does have adaptive utensils to allow her to eat independently. No changes this visit.    In addition to gabapentin and Lyrica, she also receives 1000 mg of acetaminophen Q6 hours PRN for tingling in the hands (particularly at night). She wears gloves on her hands at night to keep them warm and may wear them during the day as well, as she says they are always cold. She also has orders for PRN tramadol. She has complained of pain in her low back and her side, but denies this today.  She has a history of carpal tunnel syndrome and still has pain in her hands/wrists.  I suggested she might benefit from braces applied while she is in bed, as they seem to hurt mostly at night.  In fact, it is what wakes her up in the morning.  She describes the pain as throbbing and lasting \"for a half hour if I don't take a pain pill for it.\"    She is receiving duloxetine (20 mg QD) for depression but also for her neuropathy, and we did increase " the dose from 20 mg to 30 mg daily at my last visit. She has been seen by psych (Siddhartha King, psychologist with [facility]), as noted above. No change.    She also has a history of coronary artery disease (prior MI with stent), and generalized weakness with a history of stroke.  She is followed at the [facility], most recently on 05/26/17 to discuss stress test results. F/U in 3 months. No change.    She has hypertension and is on 10 mg of lisinopril daily along with metoprolol tartrate 50 mg BID. Systolic blood pressures range between 130-140s with diastolics between 70-80s. Apical pulse is typically 70-80s. We're going to increase her metoprolol tartrate to 100 mg BID. I am going to increase her ACEi to 20mg daily and a BMP check in one week.     She has gout and had been on 300 mg of allopurinol daily, but this was reduced to 200 mg. A follow-up uric acid level on 03/01/17 was 5.7, and we were then able to reduce her allopurinol down to 100 mg. No change.    She has a chronic recurring rash/scaliness of the right ear that tends to bother her into the ear canal. She has been seen by dermatology and is receiving ketoconazole cream on bath day mixed with triamcinolone cream. She also has an order for Elidel 1% cream. The condition has improved significantly. No change today.    Per her anemia, I am going to check her CBC and to ensure the possibility of eliminating her FeSO4 supplementation.    She is alert and oriented times three. She requires an EZ Stand for transfers but otherwise does not require a lot of assistance from nursing staff. In addition to her peripheral neuropathy, she also has some pain in the right wrist and occasionally in the left. I suspect this is a repetitive injury. It is not constant. No headaches, chest pains, dizziness or dyspnea, nausea or vomiting, dysuria, constipation or diarrhea.    Past Medical History:   Diagnosis Date     Allergic rhinitis       CAD (coronary artery disease)      Carpal tunnel syndrome      Cataract      Cerebral vascular accident      Chronic kidney disease      Debility      Depression      Diabetes mellitus, type II      Diabetic nephropathy      Diabetic neuropathy      GERD (gastroesophageal reflux disease)      Glaucoma      Gout      History of pyelonephritis      HTN (hypertension)      Hyperlipidemia      IDDM (insulin dependent diabetes mellitus)     Type 2     Lower extremity edema      Myocardial infarction              Family History   Problem Relation Age of Onset     Hypertension Mother      Stroke Mother      Cancer Father      Arthritis Brother      Social History     Social History     Marital status: Single     Spouse name: N/A     Number of children: N/A     Years of education: N/A     Social History Main Topics     Smoking status: Former Smoker     Smokeless tobacco: Not on file     Alcohol use No     Drug use: No     Sexual activity: Not on file     Other Topics Concern     Not on file     Social History Narrative    10/13/2015 - The patient lives at [facility] for [facility] years.     MEDICATIONS: Reviewed from the MAR, physician orders, and earlier progress notes.  Updated by me today (6/26/17) with adjustments in insulin reflected below. Increased insulin: breakfast to 82U, lunch to 76U, and supper to 74U.  Increased lisinopril to 20mg daily.  Current Outpatient Prescriptions   Medication Sig     acetaminophen (TYLENOL) 500 MG tablet Take 1,000 mg by mouth bedtime. Give at bedtime; also has additional pm orders. For hand pain. Don't exceed 3000mg/24hrs     acetaminophen (TYLENOL) 500 MG tablet Take 1,000 mg by mouth every 6 (six) hours as needed for pain. For pain 6-10. Don't exceed 3000mg/24hrs     allopurinol (ZYLOPRIM) 100 MG tablet Take 100 mg by mouth daily.      ammonium lactate (AMLACTIN) 12 % cream Apply 1 application topically as needed for dry skin. Apply to feet     aspirin 81 mg chewable tablet Chew 81  mg daily.     atorvastatin (LIPITOR) 80 MG tablet Take 80 mg by mouth bedtime.      calcium carbonate-vit D3-min 600 mg calcium- 400 unit Tab Take 1 tablet by mouth 2 (two) times a day.      chlorthalidone (HYGROTEN) 25 MG tablet Take 12.5 mg by mouth daily.      dorzolamide-timolol (COSOPT) 22.3-6.8 mg/mL ophthalmic solution Administer 1 drop to both eyes 2 (two) times a day.      DULoxetine (CYMBALTA) 30 MG capsule Take 30 mg by mouth daily.     ferrous sulfate 325 (65 FE) MG tablet Take 1 tablet by mouth daily with breakfast.     folic acid (FOLVITE) 1 MG tablet Take 1 mg by mouth daily.     gabapentin (NEURONTIN) 300 MG capsule Take 1,000 mg by mouth 3 (three) times a day.      insulin aspart (NOVOLOG) 100 unit/mL injection Inject 15 Units under the skin as needed. With each snack in addition to sliding scale and lantus     insulin aspart (NOVOLOG) 100 unit/mL injection Inject 82 Units under the skin Daily before breakfast.      insulin aspart (NOVOLOG) 100 unit/mL injection Inject 76 Units under the skin daily before lunch.      insulin aspart (NOVOLOG) 100 unit/mL injection Inject 74 Units under the skin daily before supper.      insulin glargine (LANTUS) 100 unit/mL injection Inject 100 Units under the skin 2 (two) times a day.      latanoprost (XALATAN) 0.005 % ophthalmic solution Administer 1 drop to both eyes bedtime.      liraglutide (VICTOZA) 0.6 mg/0.1 mL (18 mg/3 mL) PnIj injection Inject 1.8 mg under the skin daily. Call [phone] Dr. Washington if she develops nausea lasting >3 days.     lisinopril (PRINIVIL,ZESTRIL) 10 MG tablet Take 20 mg by mouth daily. Hold for SBP<110     loratadine (CLARITIN) 10 mg tablet Take 10 mg by mouth daily as needed for allergies.     metoprolol tartrate (LOPRESSOR) 50 MG tablet Take 100 mg by mouth 2 (two) times a day.      mineral oil Drop Administer 3 drops into both ears every morning. Every day shift, apply on Q-tip and rub into ears.     nitroglycerin (NITROSTAT)  "0.4 MG SL tablet Place 0.4 mg under the tongue every 5 (five) minutes as needed for chest pain. 1 tablet SL Q 5 minutes up to 3 doses. Call 911 if chest pain persists.     pregabalin (LYRICA) 25 MG capsule Take 50 mg by mouth 3 (three) times a day.      ranitidine (ZANTAC) 150 MG tablet Take 150 mg by mouth once daily.      senna-docusate (SENNOSIDES-DOCUSATE SODIUM) 8.6-50 mg tablet Take 1 tablet by mouth every other day. And 1 QOD PRN     tolnaftate (ANTIFUNGAL, TOLNAFTATE,) 1 % powder Apply 1 application topically 2 (two) times a day. Apply to feet     traMADol (ULTRAM) 50 mg tablet Take 50 mg by mouth every 8 (eight) hours as needed for pain.     ALLERGIES:   Allergies   Allergen Reactions     Nuts - Unspecified      Unable To Assess      Mountain Dew per [facility] medication review report     Vitals:    06/26/17 0958   BP: 161/82   Pulse: 71   Resp: 20   Temp: 96.8  F (36  C)   Weight: (!) 235 lb 9.6 oz (106.9 kg)   Height: 5' 3\" (1.6 m)   Stable weight.    EXAMINATION:   General: Obese middle-aged -American female, up in her wheelchair, in no apparent distress.  Head: Normocephalic and atraumatic.   Eyes: PERRLA, sclerae clear. Left eye ptosis. Dysconjugate gaze, with the right eye deviated medially.  ENT: Moist oral mucosa. No rhinorrhea or nasal discharge. Hearing is unimpaired.  Cardiovascular: Regular rate and rhythm.  2/6 systolic ejection murmur at the left sternal border.  Respiratory: Lungs clear to auscultation bilaterally.   Abdomen: Soft and nontender.   Musculoskeletal/Extremities: Bilateral trace pedal and pretibial edema.  She is wearing compression hose.  Neurologic: Difficulty with flexion of fingers bilaterally though can make a fist. Peripheral neuropathy in all extremities.  Integument: See Head and HPI.  Cognitive/Psychiatric: Alert and oriented times three. Affect is euthymic.    DIAGNOSTICS:   Results for orders placed or performed in visit on 02/16/17   Basic Metabolic Panel "   Result Value Ref Range    Sodium 138 136 - 145 mmol/L    Potassium 4.2 3.5 - 5.0 mmol/L    Chloride 109 (H) 98 - 107 mmol/L    CO2 22 22 - 31 mmol/L    Anion Gap, Calculation 7 5 - 18 mmol/L    Glucose 171 (H) 70 - 125 mg/dL    Calcium 9.8 8.5 - 10.5 mg/dL    BUN 17 8 - 22 mg/dL    Creatinine 0.97 0.60 - 1.10 mg/dL    GFR MDRD Af Amer >60 >60 mL/min/1.73m2    GFR MDRD Non Af Amer 58 (L) >60 mL/min/1.73m2     Lab Results   Component Value Date    WBC 5.8 10/27/2011    HGB 9.1 (L) 11/16/2016    HCT 25.2 (L) 10/27/2011    MCV 90 10/27/2011     10/27/2011     CrCl cannot be calculated (Patient's most recent sCr result is older than the maximum 5 days allowed.).  Lab Results   Component Value Date    HGBA1C 7.3 (H) 03/29/2017     ASSESSMENT/Plan:      ICD-10-CM    1. Type 2 diabetes mellitus E11.9    2. Essential hypertension with goal blood pressure less than 130/80 I10    3. Bilateral lower extremity edema R60.0    4. Depression F32.9    5. Morbid obesity due to excess calories E66.01      CHANGES:  Increase novolog at breakfast to 82U, lunch to 76U, and supper to 74U - DM  D/C SS insulin.  Increase lisinopril to 20mg daily - HTN  Check BMP - HTN (check potassium level)  Check CBC - anemia (possible dc FeSO4)      CARE PLAN:    The care plan has been reviewed and all orders signed. Changes to care plan, if any, as noted. Otherwise, continue care plan of care. Time spent with this patient was approximately 35 minutes with greater than 50% spent in counseling and coordination of care that included extensive review of her blood glucose levels and insulin regimen.      Cheko BRUNO, am scribing for and in the present of DANYELLE Douglas.

## 2021-06-11 NOTE — PATIENT INSTRUCTIONS
We appreciate your assistance in coordinating your healthcare.     Please upload your insulin pump, blood sugar meter and/or continuous glucose monitor at home 1-2 days before your next diabetes-related appointment.   This will allow your provider to review your  data before your scheduled virtual visit.    To ask a question to your Endocrine care team, please send them a "[employer]" message, or reach them by phone at [phone]     To expedite your medication refill(s), please contact your pharmacy and have them   fax a refill request to: [phone].  *Please allow 3 business days for routine medication refills.  *Please allow 5 business days for controlled substance medication refills.    For after-hours urgent Endocrine issues, that do not require 721, please dial [phone], and ask to speak with the Endocrinologist On-Call

## 2021-06-11 NOTE — PROGRESS NOTES
[facility]    Facility:   [facility] [[id number]]   Code Status: FULL CODE      CHIEF COMPLAINT/REASON FOR VISIT:  Chief Complaint   Patient presents with     FVP Care Coordination - Regulatory       HISTORY:      HPI: Cristal is a 70 y.o. female residing  in the long-term care facility [facility]. She has been a resident here since October 2011. She does have multiple complex co morbidities. She is treated for hypertension and has insulin-dependent diabetes mellitus. She has a endocrinologist who is following her blood sugars. Nursing is sending weekly checks to her endocrinologist.  She has chronic neuropathy, chronic kidney disease and is treated with Cymbalta for depression. She is on gabapentin and lyrica for neuropathy pain. She is on meds for depression.  Does require a lift for transfers. She has chronic weakness in her lower extremities.      Today she is seen for a routine  Regulatory visit .  She is with a  hysterectomy October 2019.   She denies chest pain or shortness of breath.  She denies cough or congestion.  She is  non ambulatory, She is on  gabapentin and lyrica for neuropathy.. Her BS are being controlled by her endocrinologist and being sent in every couple of weeks.   She denies cough or  Congestion. She is having no pain today. No open areas on her skin.     Past Medical History:   Diagnosis Date     Acute cystitis with hematuria      Allergic rhinitis      CAD (coronary artery disease)      Carpal tunnel syndrome      Cataract      Cerebral vascular accident (H)      Chronic kidney disease      Debility      Depression      Diabetes mellitus, type II (H)      Diabetic nephropathy (H)      GERD (gastroesophageal reflux disease)      Glaucoma      Gout      History of pyelonephritis      HTN (hypertension)      Hyperlipidemia      IDDM (insulin dependent diabetes mellitus) (H)     Type 2     Lower extremity edema      Myocardial  infarction (H)              Family History   Problem Relation Age of Onset     Hypertension Mother      Stroke Mother      Cancer Father      Glaucoma Father      Arthritis Brother      Diabetes Sister      Glaucoma Sister      Social History     Socioeconomic History     Marital status: Single     Spouse name: Not on file     Number of children: Not on file     Years of education: Not on file     Highest education level: Not on file   Occupational History     Not on file   Social Needs     Financial resource strain: Not on file     Food insecurity     Worry: Not on file     Inability: Not on file     Transportation needs     Medical: Not on file     Non-medical: Not on file   Tobacco Use     Smoking status: Former Smoker     Smokeless tobacco: Never Used   Substance and Sexual Activity     Alcohol use: No     Drug use: No     Sexual activity: Not on file   Lifestyle     Physical activity     Days per week: Not on file     Minutes per session: Not on file     Stress: Not on file   Relationships     Social connections     Talks on phone: Not on file     Gets together: Not on file     Attends Yazdanism service: Not on file     Active member of club or organization: Not on file     Attends meetings of clubs or organizations: Not on file     Relationship status: Not on file     Intimate partner violence     Fear of current or ex partner: Not on file     Emotionally abused: Not on file     Physically abused: Not on file     Forced sexual activity: Not on file   Other Topics Concern     Not on file   Social History Narrative    10/13/2015 - The patient lives at [facility] for 5 years.         Review of Systems  Constitutional: Positive for activity change. Negative for appetite change, chills, fatigue and fever.        Lift for transfers.    HENT: Negative for congestion and sore throat.    Respiratory: Negative for shortness of breath and wheezing.    Cardiovascular: negative for leg swelling . Negative for chest  pain. (intermittent Right  upper chest discomfort. She was seen by cardiology and no known cause found. Resident reports relief with Tylenol)       Compression stockings   Gastrointestinal: Negative for abdominal distention, abdominal pain, constipation, diarrhea and nausea.   Genitourinary: Negative for dysuria.   Musculoskeletal: Positive for arthralgias. Negative for myalgias.   Skin: Negative for color change, rash and wound.   Neurological: Positive for numbness. Negative for dizziness and weakness.        Severe neuropathy bilateral hands   Psychiatric/Behavioral: Negative for agitation, behavioral problems and sleep disturbance.   Vitals:    09/16/20 0927   BP: 107/86   Pulse: 72   Resp: 16   Temp: 98.2  F (36.8  C)   SpO2: 99%   Weight: 184 lb 8 oz (83.7 kg)       Physical Exam    Abdominal: She exhibits distension.  Abdomen is soft  Skin:    healed old lap sites on abdomen     Constitutional: She is oriented to person, place, and time. She appears well-developed and well-nourished.   Pleasant woman in no acute distress.   HENT:   Head: Normocephalic.   Eyes: Conjunctivae are normal.   Neck: Normal range of motion.   Cardiovascular: Normal rate, regular rhythm and normal heart sounds.   No murmur heard.  Pulmonary/Chest: Breath sounds normal. No respiratory distress. She has no wheezes. She has no rales.   Abdominal: Soft. Bowel sounds are normal. She exhibits no distension. There is no tenderness.   Musculoskeletal: She exhibits edema.   Wearing Compression socks   Neurological: She is alert and oriented to person, place, and time.   Neuropathy bilateral hands.   Skin: Skin is warm.   Psychiatric: She has a normal mood and affect. Her behavior is normal  LABS:   No results found for this or any previous visit (from the past 240 hour(s)).  Case Management:  I have reviewed the facility/SNF care plan/MDS which was done 8/4/20 including the falls risk, nutrition and pain screening. I also reviewed the current  immunizations, and preventive care..She is up to date on tests. Patient's desire to return to the community is not present.    Information reviewed:  Medications, vital signs, orders, and nursing notes.    ASSESSMENT:      ICD-10-CM    1. Type 2 diabetes mellitus with hyperglycemia, with long-term current use of insulin (H)  E11.65     Z79.4    2. Essential hypertension  I10    3. Physical deconditioning  R53.81        PLAN:       IDDM. FS are stable last A1C 6.9 on 3/18/20 Endocrinologist following , Continue current diabetic medications as above.       HTN. Continue  Lisinpril and metoprolol     Neuropathy.mainly  hands with reports of feet also . On lyrica and gabapentin.     CAD. HX  MI and stent.      S/p Hysterectomy in October         Electronically signed by: Shawna Mesa CNP

## 2021-06-12 NOTE — PROGRESS NOTES
[facility]    Facility:   [facility] [[id number]]   Code Status: FULL CODE      CHIEF COMPLAINT/REASON FOR VISIT:  Chief Complaint   Patient presents with     Problem Visit     review labs, cough        HISTORY:      HPI: Cristal is a 70 y.o. female residing  in the long-term care facility [facility]. She has been a resident here since October 2011. She does have multiple complex co morbidities. She is treated for hypertension and has insulin-dependent diabetes mellitus. She has a endocrinologist who is following her blood sugars. Nursing is sending weekly checks to her endocrinologist.  She has chronic neuropathy, chronic kidney disease and is treated with Cymbalta for depression. She is on gabapentin and lyrica for neuropathy pain. She is on meds for depression.  Does require a lift for transfers. She has chronic weakness in her lower extremities.      Today she is seen for review of abnormal ferritin level. Iron studies and CBC to be checked. She is currently on ferrous sulfate.  She is with a  hysterectomy October 2019.   She denies chest pain or shortness of breath.  She reports a dry cough.Covid negative on 10/9/20.   She is  non ambulatory, She is on  gabapentin and lyrica for neuropathy.. Her BS are being controlled by her endocrinologist and being sent in every couple of weeks.    No open areas on her skin.     Past Medical History:   Diagnosis Date     Acute cystitis with hematuria      Allergic rhinitis      CAD (coronary artery disease)      Carpal tunnel syndrome      Cataract      Cerebral vascular accident (H)      Chronic kidney disease      Debility      Depression      Diabetes mellitus, type II (H)      Diabetic nephropathy (H)      GERD (gastroesophageal reflux disease)      Glaucoma      Gout      History of pyelonephritis      HTN (hypertension)      Hyperlipidemia      IDDM (insulin dependent diabetes mellitus) (H)     Type 2     Lower  extremity edema      Myocardial infarction (H)              Family History   Problem Relation Age of Onset     Hypertension Mother      Stroke Mother      Cancer Father      Glaucoma Father      Arthritis Brother      Diabetes Sister      Glaucoma Sister      Social History     Socioeconomic History     Marital status: Single     Spouse name: Not on file     Number of children: Not on file     Years of education: Not on file     Highest education level: Not on file   Occupational History     Not on file   Social Needs     Financial resource strain: Not on file     Food insecurity     Worry: Not on file     Inability: Not on file     Transportation needs     Medical: Not on file     Non-medical: Not on file   Tobacco Use     Smoking status: Former Smoker     Smokeless tobacco: Never Used   Substance and Sexual Activity     Alcohol use: No     Drug use: No     Sexual activity: Not on file   Lifestyle     Physical activity     Days per week: Not on file     Minutes per session: Not on file     Stress: Not on file   Relationships     Social connections     Talks on phone: Not on file     Gets together: Not on file     Attends Scientologist service: Not on file     Active member of club or organization: Not on file     Attends meetings of clubs or organizations: Not on file     Relationship status: Not on file     Intimate partner violence     Fear of current or ex partner: Not on file     Emotionally abused: Not on file     Physically abused: Not on file     Forced sexual activity: Not on file   Other Topics Concern     Not on file   Social History Narrative    10/13/2015 - The patient lives at [facility] for 5 years.         Review of Systems  Constitutional: Positive for activity change. Negative for appetite change, chills, fatigue and fever.        Lift for transfers.    HENT: Negative for congestion and sore throat.    Respiratory: Negative for shortness of breath and wheezing.    Cardiovascular: negative for leg  swelling . Negative for chest pain. (intermittent Right  upper chest discomfort. She was seen by cardiology and no known cause found. Resident reports relief with Tylenol)       Compression stockings   Gastrointestinal: Negative for abdominal distention, abdominal pain, constipation, diarrhea and nausea.   Genitourinary: Negative for dysuria.   Musculoskeletal: Positive for arthralgias. Negative for myalgias.   Skin: Negative for color change, rash and wound.   Neurological: Positive for numbness. Negative for dizziness and weakness.        Severe neuropathy bilateral hands   Psychiatric/Behavioral: Negative for agitation, behavioral problems and sleep disturbance.   Vitals:    10/28/20 0921   BP: 114/68   Pulse: 72   Resp: 20   Temp: 98.5  F (36.9  C)   SpO2: 95%   Weight: 182 lb (82.6 kg)       Physical Exam    Abdominal: She exhibits distension.  Abdomen is soft  Skin:    healed old lap sites on abdomen     Constitutional: She is oriented to person, place, and time. She appears well-developed and well-nourished.   Pleasant woman in no acute distress.   HENT:   Head: Normocephalic.   Eyes: Conjunctivae are normal.   Neck: Normal range of motion.   Cardiovascular: Normal rate, regular rhythm and normal heart sounds.   No murmur heard.  Pulmonary/Chest: Breath sounds normal. No respiratory distress. She has no wheezes. She has no rales.   Abdominal: Soft. Bowel sounds are normal. She exhibits no distension. There is no tenderness.   Musculoskeletal: She exhibits edema.   Wearing Compression socks   Neurological: She is alert and oriented to person, place, and time.   Neuropathy bilateral hands.   Skin: Skin is warm.   Psychiatric: She has a normal mood and affect. Her behavior is normal  LABS:   No results found for this or any previous visit (from the past 240 hour(s)).  ASSESSMENT:      ICD-10-CM    1. Elevated ferritin  R79.89    2. Cough  R05        PLAN:      Increased ferritin- Check iron studies and CBC.  Currently on ferrous sulfate.      IDDM. FS are stable last A1C 6.9 on 3/18/20 Endocrinologist following , Continue current diabetic medications as above.       HTN. Continue  Lisinpril and metoprolol     Neuropathy.mainly  hands with reports of feet also . On lyrica and gabapentin.     CAD. HX  MI and stent.      S/p Hysterectomy in October 2019        Electronically signed by: Shawna Mesa, CNP

## 2021-06-12 NOTE — TELEPHONE ENCOUNTER
[facility] Nurse Triage Telephone Note      Provider: DANYLELE Wesley  Facility: [facility] Type: LTC    Caller: Rosalva  Call Back Number:  [phone]    Allergies: Nuts - unspecified and Unable to assess    Reason for call:     Ref Range & Units 11/2/20 0623    Iron 42 - 175 ug/dL 60     Transferrin 212 - 360 mg/dL 178Low      Transferrin Saturation, Calculated 20 - 50 % 27     Transferrin IBC, Calculated 313 - 563 ug/dL 223Low          Ferritin 399, Hgb 10.8 (9.8) B-12 260. On Iron 1 daily.    Verbal Order/Direction given by Provider: Increase Iron to 1 two times a day.    Provider giving order: DANYELLE Weslye    Verbal order given to: Rosalva Reis RN

## 2021-06-12 NOTE — PROGRESS NOTES
[facility]    Name:   Cristal Preciado  : 1950  Facility:   [facility] [place][note 2] [[id number]]   Room: [address]  Code Status: FULL CODE -   Fac type:   NF (Long Term Care, LTC) -     CHIEF COMPLAINT / REASON FOR VISIT:  Chief Complaint   Patient presents with     Review Of Multiple Medical Conditions    Patient was last seen by me on 17 and subsequently seen by Dr. Valdez on 17.    HPI: Cristal is a 67 y.o.  female who is alert and oriented, residing in this facility due to significant overall debility. She has 5 children, one in particular who has mental issues.  Over the last few years, she has had family issues she has had to deal with.  She has been seen by psych here and and diagnosed with major depressive disorder (recurrent episode, moderate), mild vascular neurocognitive disorder, and adjustment disorder with anxiety.      She suffers from morbid obesity (BMI 42), and she is actually up about 6 pounds since my last visit.  She tells me, however, that she is eating less than she did before, and she believes the weight gain is water weight.  I do note increased lower extremity edema.    Major diagnoses are related to diabetes mellitus type II.  While she has been seen by multiple diabetic specialists (Dr. Ordonez and Lea ARGUELLES ([facility]) every 3 months, last on 17), we have been managing her blood glucose levels over the last several months.  Currently, fasting blood glucose levels range between 177 and 284.  At lunch, the range is between 234 and 295 (with a 349 outlier). At supper, things look better, ranging from 151-259 with several below 200.  At bedtime, they are up again, ranging from 221-278 (with a 202 outlier).     She is receiving Victoza (1.8 mg subcu daily) and insulin, administered as Lantus 100 mg twice daily and prandial NovoLog, administered 86 units with breakfast, 82 units with lunch, and 74  "units with supper.  A mild last visit, we discontinued sliding scale coverage.  We are going to be increasing the breakfast dose to 90 units in the lunch dose to 88 units, keeping the supper dose as it is.   She goes on outings quite frequently, but we worked out a plan to make sure that she does not miss her insulin dosing.     Over the years, there has been improvement.  A hemoglobin A1c in March 2016 was 8.0%, up from 7.5% in October 2011.  Most recently, with careful management, we have gotten it down her Hgb A1C to 7.3 on 3/27/17.    Unfortunately, as a result of her diabetes, she also has diabetic peripheral neuropathy particularly in the hands and feet (\"they tingle a lot\"), and is on both Lyrica (50 mg TID) and gabapentin (now 1000 mg TID). She has told me she is getting some relief from this, but in no way is this resolving her issues.  She tells me, \"I don't have the tingling in my feet no more since taking the Lyrica,\" but she still has it in her hands and fingers.  She says she can feel it every morning around 5 AM, and it lasts a little longer than 30 minutes.  Keeping her hands warm (with gloves) helps.  It is significant enough that she has difficulty picking things up (not In addition to gabapentin and Lyrica, she also receives 1000 mg of acetaminophen Q6 hours PRNmuch flexing the fingers), and she does have adaptive utensils to allow her to eat independently.  In addition to the above, she also has nephropathy (chronic renal insufficiency stage II).     She also has orders for PRN tramadol. She has complained of pain in her low back and her side, but denies this today.  She tells me she takes it maybe twice per week.      She has a history of carpal tunnel syndrome and still has pain in her hands/wrists.  I suggested she might benefit from braces applied while she is in bed, as they seem to hurt mostly at night, and therapy did fit her with a pair.    She is receiving duloxetine (20 mg QD) for " depression but also for her neuropathy, and we did increase the dose from 20 mg to 30 mg daily. She has been seen by psych (Siddhartha King, psychologist with [facility]), as noted above.     She also has a history of coronary artery disease (prior MI with stent), and generalized weakness with a history of stroke.  She is followed at the [facility], most recently on 05/26/17 to discuss stress test results.  I believe she is expected to follow-up this month.    She has hypertension (mostly systolic with a BP of 161/67 today) and is on 20 mg of lisinopril daily along with metoprolol tartrate 50 mg BID and 12.5 mg of chlorthalidone daily.  At my last visit we increased her lisinopril from 10 mg daily.  Today, we will increase it to 30 mg and also increase the chlorthalidone to 25 mg.  We will check a BMP in 1 week.    She has a history of gout and had been on 300 mg of allopurinol daily, but this was reduced to 200 mg. A follow-up uric acid level on 03/01/17 was 5.7, and we were then able to reduce her allopurinol down to 100 mg. No need for any further changes at this time.    She has a chronic recurring rash/scaliness of the right ear that tends to bother her into the ear canal. She has been seen by dermatology and is receiving ketoconazole cream on bath day mixed with triamcinolone cream. She also has an order for Elidel 1% cream. The condition has improved significantly.    Per her anemia, we did check a CBC and, with a hemoglobin of 9, we will continue with iron supplementation.    She is alert and oriented ×3. She requires an EZ Stand for transfers but otherwise does not require a lot of assistance from nursing staff.  No headaches, chest pains, dizziness or dyspnea, nausea or vomiting, dysuria, constipation or diarrhea.    Past Medical History:   Diagnosis Date     Allergic rhinitis      CAD (coronary artery disease)      Carpal tunnel syndrome      Cataract      Cerebral  vascular accident      Chronic kidney disease      Debility      Depression      Diabetes mellitus, type II      Diabetic nephropathy      Diabetic neuropathy      GERD (gastroesophageal reflux disease)      Glaucoma      Gout      History of pyelonephritis      HTN (hypertension)      Hyperlipidemia      IDDM (insulin dependent diabetes mellitus)     Type 2     Lower extremity edema      Myocardial infarction              Family History   Problem Relation Age of Onset     Hypertension Mother      Stroke Mother      Cancer Father      Arthritis Brother      Social History     Social History     Marital status: Single     Spouse name: N/A     Number of children: N/A     Years of education: N/A     Social History Main Topics     Smoking status: Former Smoker     Smokeless tobacco: Not on file     Alcohol use No     Drug use: No     Sexual activity: Not on file     Other Topics Concern     Not on file     Social History Narrative    10/13/2015 - The patient lives at [facility] for [facility] years.     MEDICATIONS: Reviewed from the MAR, physician orders, and earlier progress notes.  Updated by me today (08/08/17) with adjustments in insulin and antihypertensives reflected below.   Current Outpatient Prescriptions   Medication Sig     acetaminophen (TYLENOL) 500 MG tablet Take 1,000 mg by mouth bedtime. Give at bedtime; also has additional pm orders. For hand pain. Don't exceed 3000mg/24hrs     acetaminophen (TYLENOL) 500 MG tablet Take 1,000 mg by mouth every 6 (six) hours as needed for pain. For pain 6-10. Don't exceed 3000mg/24hrs     allopurinol (ZYLOPRIM) 100 MG tablet Take 100 mg by mouth daily.      ammonium lactate (AMLACTIN) 12 % cream Apply 1 application topically as needed for dry skin. Apply to feet     aspirin 81 mg chewable tablet Chew 81 mg daily.     atorvastatin (LIPITOR) 80 MG tablet Take 80 mg by mouth bedtime.      calcium carbonate-vit D3-min 600 mg calcium- 400 unit Tab Take 1 tablet by mouth 2 (two)  times a day.      chlorthalidone (HYGROTEN) 25 MG tablet Take 25 mg by mouth daily.      dorzolamide-timolol (COSOPT) 22.3-6.8 mg/mL ophthalmic solution Administer 1 drop to both eyes 2 (two) times a day.      DULoxetine (CYMBALTA) 30 MG capsule Take 30 mg by mouth daily.     ferrous sulfate 325 (65 FE) MG tablet Take 1 tablet by mouth daily with breakfast.     folic acid (FOLVITE) 1 MG tablet Take 1 mg by mouth daily.     gabapentin (NEURONTIN) 300 MG capsule Take 1,000 mg by mouth 3 (three) times a day.      insulin aspart (NOVOLOG) 100 unit/mL injection Inject 15 Units under the skin as needed. With each snack in addition to sliding scale and lantus     insulin aspart (NOVOLOG) 100 unit/mL injection Inject 90 Units under the skin Daily before breakfast.      insulin aspart (NOVOLOG) 100 unit/mL injection Inject 88 Units under the skin daily before lunch.      insulin aspart (NOVOLOG) 100 unit/mL injection Inject 74 Units under the skin daily before supper.      insulin glargine (LANTUS) 100 unit/mL injection Inject 100 Units under the skin 2 (two) times a day.      latanoprost (XALATAN) 0.005 % ophthalmic solution Administer 1 drop to both eyes bedtime.      liraglutide (VICTOZA) 0.6 mg/0.1 mL (18 mg/3 mL) PnIj injection Inject 1.8 mg under the skin daily. Call [phone] Dr. Washington if she develops nausea lasting >3 days.     lisinopril (PRINIVIL,ZESTRIL) 10 MG tablet Take 30 mg by mouth daily. Hold for SBP<110     loratadine (CLARITIN) 10 mg tablet Take 10 mg by mouth daily as needed for allergies.     metoprolol tartrate (LOPRESSOR) 50 MG tablet Take 100 mg by mouth 2 (two) times a day.      mineral oil Drop Administer 3 drops into both ears every morning. Every day shift, apply on Q-tip and rub into ears.     nitroglycerin (NITROSTAT) 0.4 MG SL tablet Place 0.4 mg under the tongue every 5 (five) minutes as needed for chest pain. 1 tablet SL Q 5 minutes up to 3 doses. Call [phone] if chest pain persists.      "pregabalin (LYRICA) 25 MG capsule Take 50 mg by mouth 3 (three) times a day.      ranitidine (ZANTAC) 150 MG tablet Take 150 mg by mouth once daily.      senna-docusate (SENNOSIDES-DOCUSATE SODIUM) 8.6-50 mg tablet Take 1 tablet by mouth every other day. And 1 QOD PRN     tolnaftate (ANTIFUNGAL, TOLNAFTATE,) 1 % powder Apply 1 application topically 2 (two) times a day. Apply to feet     traMADol (ULTRAM) 50 mg tablet Take 50 mg by mouth every 8 (eight) hours as needed for pain.     ALLERGIES:   Allergies   Allergen Reactions     Nuts - Unspecified      Unable To Assess      M[place] per [facility] medication review report     Vitals:    08/08/17 1009   BP: 161/67   Pulse: 76   Resp: 20   Temp: 97  F (36.1  C)   Weight: (!) 241 lb 12.8 oz (109.7 kg)   Height: 5' 3\" (1.6 m)   I do note a 6 pound weight gain.  This was discussed with the patient, and she feels that it is fluid weight, as she claims to be eating less.  Because I noted increased lower extremity edema, we are increasing her diuretic.    EXAMINATION:   General: Obese middle-aged -American female, sitting outside in her wheelchair, in no apparent distress.  Head: Normocephalic and atraumatic.   Eyes: PERRLA, sclerae clear. Left eye ptosis. Dysconjugate gaze, with the right eye deviated medially.  ENT: Moist oral mucosa. No rhinorrhea or nasal discharge. Hearing is unimpaired.  Cardiovascular: Regular rate and rhythm.  2/6 systolic ejection murmur at the left sternal border.  Respiratory: Lungs clear to auscultation bilaterally.   Abdomen: Soft and nontender.   Musculoskeletal/Extremities: Bilateral 2+ pedal and 1+ pretibial edema, somewhat worse than previously.  She is wearing compression hose.  Neurologic: Difficulty with flexion of fingers bilaterally though can make a fist. Peripheral neuropathy in all extremities.  Integument: See Head and HPI.  Cognitive/Psychiatric: Alert and oriented ×3. Affect is euthymic.    DIAGNOSTICS:   Results " for orders placed or performed in visit on 06/27/17   Basic Metabolic Panel   Result Value Ref Range    Sodium 135 (L) 136 - 145 mmol/L    Potassium 4.1 3.5 - 5.0 mmol/L    Chloride 104 98 - 107 mmol/L    CO2 25 22 - 31 mmol/L    Anion Gap, Calculation 6 5 - 18 mmol/L    Glucose 174 (H) 70 - 125 mg/dL    Calcium 9.6 8.5 - 10.5 mg/dL    BUN 19 8 - 22 mg/dL    Creatinine 1.04 0.60 - 1.10 mg/dL    GFR MDRD Af Amer >60 >60 mL/min/1.73m2    GFR MDRD Non Af Amer 53 (L) >60 mL/min/1.73m2     Lab Results   Component Value Date    WBC 4.2 06/27/2017    HGB 9.0 (L) 06/27/2017    HCT 26.2 (L) 06/27/2017    MCV 99 06/27/2017     (L) 06/27/2017     CrCl cannot be calculated (Patient's most recent sCr result is older than the maximum 5 days allowed.).  Lab Results   Component Value Date    HGBA1C 7.3 (H) 03/29/2017     ASSESSMENT/Plan:      ICD-10-CM    1. Type 2 diabetes mellitus E11.9    2. Morbid obesity with BMI of 40.0-44.9, adult E66.01     Z68.41    3. Diabetic peripheral neuropathy associated with type 2 diabetes mellitus E11.42    4. Bilateral lower extremity edema R60.0    5. CKD (chronic kidney disease), stage 2 (mild) N18.2    6. Depression, unspecified depression type F32.9      CHANGES:  1) Increase NovoLog at breakfast to from 86 units to 90 units  Increase NovoLog at lunch from 82 units to 88 units  2) Increase lisinopril from 20 mg to 30 mg daily.  Increase chlorthalidone from 12.5 mg to 25 mg daily.  Check a BMP in one week.    CARE PLAN:    The care plan has been reviewed and all orders signed. Changes to care plan, if any, as noted. Otherwise, continue care plan of care. Time spent with this patient was approximately 35 minutes with greater than 50% spent in counseling and coordination of care that included extensive review of her blood glucose levels and insulin regimen as well as hypertension and current medications.      Cheko BRUNO, am scribing for and in the present of Reji Vivas,  AGNP.

## 2021-06-12 NOTE — TELEPHONE ENCOUNTER
This patient's medication list and chart were reviewed as part of the service provided by [facility].    Assessment/Recommendations:  1. (Neuropathy):  Pt is on both Gabapentin and Lyrica which is duplicative; these meds have similar mechanism of action, combo does not add benefit, but increases risk of adverse effects.  Pt with peripheral edema, and both meds may contribute.  May also contribute to confusion, sedation, dizziness, etc.  Pt is currently on max Gabapentin dose for her kidney function, but does have room to increase Lyrica up to a max of 300mg total daily if necessary (based on last BMP; see below.  Due for updated BMP).  Please consider taper and d'c of Gabapentin and maximize Lyrica dose instead.  Consider reduction in Gabapentin to 300mg twice daily for one week, then once daily for one week, then d'c.  May increase Lyrica to 100mg three times daily scheduled if necessary (if kidney function allows following updated BMP).  2. (Anemia):  Recent ferritin elevated and last MCV in 9/2019 >95, indicating likely not iron deficient.  MCV was slightly elevated and noted recent B12 level 260 (>300 preferred for cognition).   Consider d'c iron supplement, add vitamin B12 1000mcg orally daily and follow-up CBC and ferritin, B12 in 3 months.  Of note, low B12 may also contribute to neuropathy symptoms.    *On chart review, notes indicate pt on Lisinopril, however, I do not see this on pt's Epic med list and list recently reconciled.  I am unsure if lisinopril was stopped for some reason, or if this was inadvertently stopped, or if Epic med list is incorrect (I do not see an Epic note indicating it should be stopped).  Pt may benefit from lisinopril due to h/o HTN, CKD, diabetes, CAD.  If not currently taking, and no contraindication to use, may consider starting low dose lisinopril, follow-up BMP in one week, and monitor Bps.  Reduction in Metoprolol may be appropriate if Bps  regularly <140/90mmHg, or if HR is on low end.    Est CrCl ([place]ro[place]-Gault) = 45ml/min (based on Scr 1.22 and adjusted BW 64.9kg) - Due for updated BMP.  Pt is on meds that do need to be renally adjusted (duloxetine, gabapentin, lyrica, metformin, tramadol), and noted last Na level slightly low.    Tessie Brar, Pharm.D.,[facility] [place]  Board Certified Geriatric Pharmacist  Medication Therapy Management Pharmacist  [phone]

## 2021-06-12 NOTE — TELEPHONE ENCOUNTER
[facility] Nurse Triage Telephone Note      Provider: DANYELLE Wesley  Facility: [facility]    Facility Type: LTC    Caller: Rosalva  Call Back Number:  [phone]    Allergies: Nuts - unspecified and Unable to assess    Reason for call: Pt verbalizing that when she's feeding herself with adaptive equipement that she has lost strength & food is falling off her utensils. She has also had some weight-loss. Wonder if she isn't more depressed also without family visits.    [facility] Standing order given:- Start in house PT & OT if requested by facility staff or family.    Provider giving order: Shawna Mesa NP    Verbal order given to: Rosalva Reis RN

## 2021-06-12 NOTE — TELEPHONE ENCOUNTER
[facility] Nurse Triage Telephone Note      Provider: DANYELLE Wesley  Facility: formerly [facility] Type: LTC    Caller: Tere  Call Back Number:  [phone]    Allergies: Nuts - unspecified and Unable to assess    Reason for call: Nurse reporting patient's ferritin level.  Notable meds:  Ferrous sulfate 325mg daily, folic acid 1mg daily.       Verbal Order/Direction given by Provider: No new orders.      Provider giving order: DANYELLE Wesley    Verbal order given to: Tere Vora RN

## 2021-06-12 NOTE — PROGRESS NOTES
[facility]    Facility:   [facility] [[id number]]   Code Status: FULL CODE       Chief Complaint   Patient presents with     Review Of Multiple Medical Conditions     [facility] 7/31/17.       HPI:  Cristal Preciado is a 67-year-old female being seen for routine physician follow-up in the long-term care facility [facility]. She has been a resident here since October 2011 but we took over her in-house care as of September 2015. She does have multiple complex co morbidities. She is treated for hypertension and has insulin-dependent diabetes mellitus. She has a endocrinologist who follows her closely. She has chronic neuropathy, chronic kidney disease and is treated with Cymbalta for depression.    No interim changes since my last visit. ROS updated. She is non ambulatory. She is on gabapentin and lyrica for neuropathy pain. She is on meds for depression.  Does require a lift for transfers. She has chronic weakness in her lower extremities. She has not been ill recently with any cough, cold or congestion. She has a good appetite. She denies headache, nausea, vomiting, chest pain, shortness of breath. No change in bowel or bladder habits. No open areas on her skin. No new concerns.      PAST MEDICAL HISTORY:  Past Medical History:   Diagnosis Date     Allergic rhinitis      CAD (coronary artery disease)      Carpal tunnel syndrome      Cataract      Cerebral vascular accident      Chronic kidney disease      Debility      Depression      Diabetes mellitus, type II      Diabetic nephropathy      Diabetic neuropathy      GERD (gastroesophageal reflux disease)      Glaucoma      Gout      History of pyelonephritis      HTN (hypertension)      Hyperlipidemia      IDDM (insulin dependent diabetes mellitus)     Type 2     Lower extremity edema      Myocardial infarction        MEDICATIONS:  Current Outpatient Prescriptions   Medication Sig     acetaminophen (TYLENOL) 500 MG  tablet Take 1,000 mg by mouth bedtime. Give at bedtime; also has additional pm orders. For hand pain. Don't exceed 3000mg/24hrs     acetaminophen (TYLENOL) 500 MG tablet Take 1,000 mg by mouth every 6 (six) hours as needed for pain. For pain 6-10. Don't exceed 3000mg/24hrs     allopurinol (ZYLOPRIM) 100 MG tablet Take 100 mg by mouth daily.      ammonium lactate (AMLACTIN) 12 % cream Apply 1 application topically as needed for dry skin. Apply to feet     aspirin 81 mg chewable tablet Chew 81 mg daily.     atorvastatin (LIPITOR) 80 MG tablet Take 80 mg by mouth bedtime.      calcium carbonate-vit D3-min 600 mg calcium- 400 unit Tab Take 1 tablet by mouth 2 (two) times a day.      chlorthalidone (HYGROTEN) 25 MG tablet Take 12.5 mg by mouth daily.      dorzolamide-timolol (COSOPT) 22.3-6.8 mg/mL ophthalmic solution Administer 1 drop to both eyes 2 (two) times a day.      DULoxetine (CYMBALTA) 30 MG capsule Take 30 mg by mouth daily.     ferrous sulfate 325 (65 FE) MG tablet Take 1 tablet by mouth daily with breakfast.     folic acid (FOLVITE) 1 MG tablet Take 1 mg by mouth daily.     gabapentin (NEURONTIN) 300 MG capsule Take 1,000 mg by mouth 3 (three) times a day.      insulin aspart (NOVOLOG) 100 unit/mL injection Inject 15 Units under the skin as needed. With each snack in addition to sliding scale and lantus     insulin aspart (NOVOLOG) 100 unit/mL injection Inject 86 Units under the skin Daily before breakfast.      insulin aspart (NOVOLOG) 100 unit/mL injection Inject 82 Units under the skin daily before lunch.      insulin aspart (NOVOLOG) 100 unit/mL injection Inject 74 Units under the skin daily before supper.      insulin glargine (LANTUS) 100 unit/mL injection Inject 100 Units under the skin 2 (two) times a day.      latanoprost (XALATAN) 0.005 % ophthalmic solution Administer 1 drop to both eyes bedtime.      liraglutide (VICTOZA) 0.6 mg/0.1 mL (18 mg/3 mL) PnIj injection Inject 1.8 mg under the skin  daily. Call [phone] Dr. Washington if she develops nausea lasting >3 days.     lisinopril (PRINIVIL,ZESTRIL) 10 MG tablet Take 20 mg by mouth daily. Hold for SBP<110     loratadine (CLARITIN) 10 mg tablet Take 10 mg by mouth daily as needed for allergies.     metoprolol tartrate (LOPRESSOR) 50 MG tablet Take 100 mg by mouth 2 (two) times a day.      mineral oil Drop Administer 3 drops into both ears every morning. Every day shift, apply on Q-tip and rub into ears.     nitroglycerin (NITROSTAT) 0.4 MG SL tablet Place 0.4 mg under the tongue every 5 (five) minutes as needed for chest pain. 1 tablet SL Q 5 minutes up to 3 doses. Call [phone] if chest pain persists.     pregabalin (LYRICA) 25 MG capsule Take 50 mg by mouth 3 (three) times a day.      ranitidine (ZANTAC) 150 MG tablet Take 150 mg by mouth once daily.      senna-docusate (SENNOSIDES-DOCUSATE SODIUM) 8.6-50 mg tablet Take 1 tablet by mouth every other day. And 1 QOD PRN     tolnaftate (ANTIFUNGAL, TOLNAFTATE,) 1 % powder Apply 1 application topically 2 (two) times a day. Apply to feet     traMADol (ULTRAM) 50 mg tablet Take 50 mg by mouth every 8 (eight) hours as needed for pain.       PHYSICAL EXAM:  Gen.: Patient is alert, pleasant, female, sitting in a wheelchair, no distress.  Vitals: Blood pressure 130/78, Temp 98, Pulse 80, Respirations 20, O2 sat 95% RA.  HEENT: Eyes show no conjunctival injection or icterus. Nares negative. Oropharynx moist.  Lungs: Clear. No wheezes.  Cardiovascular: Regular rate and rhythm, normal S1 and S2.  Abdomen: Obese, soft, non tender.  Back: No reproducible tenderness. No bruising noted.  Extremities: She does have moderate lower extremity edema, is wearing Jobst stockings.  Musculoskeletal: Degenerative changes noted.      DIAGNOSTICS:  10/20/15: sodium 138, potassium 4.0, BUN 19, creatinine 0.86, GFR greater than 60.  9/12/16: Hgb A1C 7.5%.  3/29/17: Hgb A1C 7.3%.      ASSESSMENT/PLAN:  1. IDDM. Doing well on current  regimen.  2. HTN. BPs well controlled.  3. Neuropathy. Nilo hands and feet. Cont meds.  4. CAD. Hx MI and stent. Saw cardiology in May.   5. Chronic kidney disease.  6. Depression. Cont on Cymbalta.        Electronically signed by: Meagan Valdez MD

## 2021-06-12 NOTE — TELEPHONE ENCOUNTER
[facility] Nurse Triage Telephone Note      Provider: DANYELLE Wesley  Facility: [facility] Type: LTC    Caller: Bernard  Call Back Number:  [phone]    Allergies: Nuts - unspecified and Unable to assess    Reason for call: Nurse reporting BG prior to dinner is 59.  Patient is not having any symptoms.  BG prior to lunch was 112 and prior to breakfast was 84.  Notable meds:  Toujeo 90 units Q AM, Victoza 1.8mg Q AM, Metformin XR 1000mg Q AM.       Verbal Order/Direction given by Provider: Have patient eat right away.  Update patient's endocrinologist in the morning with BG readings.      Provider giving order: DANYELLE Wesley    Verbal order given to: Bernard Vora RN

## 2021-06-13 NOTE — PROGRESS NOTES
[facility]    Facility:   [facility] [[id number]]   Code Status: FULL CODE      Chief Complaint   Patient presents with     Review Of Multiple Medical Conditions     [facility] 9/19/17.     Problem Visit     Sore throat       HPI:  Cristal Preciado is a 67-year-old female being seen for routine physician follow-up in the long-term care facility [facility]. She has been a resident here since October 2011 but we took over her in-house care as of September 2015. She does have multiple complex co morbidities. She is treated for hypertension and has insulin-dependent diabetes mellitus. She has a endocrinologist. She has chronic neuropathy, chronic kidney disease and is treated with Cymbalta for depression. She is on gabapentin and lyrica for neuropathy pain. She is on meds for depression.  Does require a lift for transfers. She has chronic weakness in her lower extremities.     Complains of sore throat for a few days. No cough or chest congestion. Possibly PND. Had some itchy eyes last week. No redness to eyes. Other residents ill with respiratory illness. Cristal has not had chest pain or shortness of breath. No sx documented per nursing. Will order sore throat lozenges and observe for any worsening or additional sx.  Open area right posterior gluteal fold has healed but still sensitive, using Mepilex for protection. No pain in area per patient report. Sits much orf the day in wheelchair, able to change positions. Non ambulatory. Signif degen changes, impaired mobility of both hands, often wears edema gloves. Poor dexterity.       PAST MEDICAL HISTORY:  Past Medical History:   Diagnosis Date     Allergic rhinitis      CAD (coronary artery disease)      Carpal tunnel syndrome      Cataract      Cerebral vascular accident      Chronic kidney disease      Debility      Depression      Diabetes mellitus, type II      Diabetic nephropathy      Diabetic neuropathy      GERD  (gastroesophageal reflux disease)      Glaucoma      Gout      History of pyelonephritis      HTN (hypertension)      Hyperlipidemia      IDDM (insulin dependent diabetes mellitus)     Type 2     Lower extremity edema      Myocardial infarction        MEDICATIONS:  Current Outpatient Prescriptions   Medication Sig     acetaminophen (TYLENOL) 500 MG tablet Take 1,000 mg by mouth bedtime. Give at bedtime; also has additional pm orders. For hand pain. Don't exceed 3000mg/24hrs     acetaminophen (TYLENOL) 500 MG tablet Take 1,000 mg by mouth every 6 (six) hours as needed for pain. For pain 6-10. Don't exceed 3000mg/24hrs     ammonium lactate (AMLACTIN) 12 % cream Apply 1 application topically as needed for dry skin. Apply to feet     aspirin 81 mg chewable tablet Chew 81 mg daily.     atorvastatin (LIPITOR) 80 MG tablet Take 80 mg by mouth bedtime.      calcium carbonate-vit D3-min 600 mg calcium- 400 unit Tab Take 1 tablet by mouth 2 (two) times a day.      chlorthalidone (HYGROTEN) 25 MG tablet Take 25 mg by mouth daily.      dorzolamide-timolol (COSOPT) 22.3-6.8 mg/mL ophthalmic solution Administer 1 drop to both eyes 2 (two) times a day.      DULoxetine (CYMBALTA) 30 MG capsule Take 30 mg by mouth daily.     ferrous sulfate 325 (65 FE) MG tablet Take 1 tablet by mouth daily with breakfast.     folic acid (FOLVITE) 1 MG tablet Take 1 mg by mouth daily.     gabapentin (NEURONTIN) 300 MG capsule Take 1,000 mg by mouth 3 (three) times a day.      insulin aspart (NOVOLOG) 100 unit/mL injection Inject 15 Units under the skin as needed. With each snack in addition to sliding scale and lantus     insulin aspart (NOVOLOG) 100 unit/mL injection Inject 90 Units under the skin Daily before breakfast.      insulin aspart (NOVOLOG) 100 unit/mL injection Inject 88 Units under the skin daily before lunch.      insulin aspart (NOVOLOG) 100 unit/mL injection Inject 74 Units under the skin daily before supper.      insulin glargine  (LANTUS) 100 unit/mL injection Inject 100 Units under the skin 2 (two) times a day.      latanoprost (XALATAN) 0.005 % ophthalmic solution Administer 1 drop to both eyes bedtime.      liraglutide (VICTOZA) 0.6 mg/0.1 mL (18 mg/3 mL) PnIj injection Inject 1.8 mg under the skin daily. Call [phone] Dr. Washington if she develops nausea lasting >3 days.     lisinopril (PRINIVIL,ZESTRIL) 10 MG tablet Take 30 mg by mouth daily. Hold for SBP<110     loratadine (CLARITIN) 10 mg tablet Take 10 mg by mouth daily as needed for allergies.     metoprolol tartrate (LOPRESSOR) 50 MG tablet Take 100 mg by mouth 2 (two) times a day.      mineral oil Drop Administer 3 drops into both ears every morning. Every day shift, apply on Q-tip and rub into ears.     nitroglycerin (NITROSTAT) 0.4 MG SL tablet Place 0.4 mg under the tongue every 5 (five) minutes as needed for chest pain. 1 tablet SL Q 5 minutes up to 3 doses. Call [phone] if chest pain persists.     pregabalin (LYRICA) 25 MG capsule Take 50 mg by mouth 3 (three) times a day.      ranitidine (ZANTAC) 150 MG tablet Take 150 mg by mouth once daily.      senna-docusate (SENNOSIDES-DOCUSATE SODIUM) 8.6-50 mg tablet Take 1 tablet by mouth daily as needed for constipation.     tolnaftate (ANTIFUNGAL, TOLNAFTATE,) 1 % powder Apply 1 application topically 2 (two) times a day. Apply to feet     traMADol (ULTRAM) 50 mg tablet Take 50 mg by mouth every 8 (eight) hours as needed for pain.       PHYSICAL EXAM:  Gen.: Patient is alert, pleasant, female, sitting in a wheelchair, no distress.  Vitals: Blood pressure 126/80, Temp 98.4, Pulse 70, Respirations 20, O2 sat 94% RA.  HEENT: Eyes show no conjunctival injection or icterus. Nares negative. Oropharynx moist, no pharyngeal redness or exudate.  Lungs: Clear. No wheezes.  Cardiovascular: Regular rate and rhythm, normal S1 and S2.  Abdomen: Obese, soft, non tender.  Back: No reproducible tenderness.   Extremities: She does have moderate lower  extremity edema, is wearing Jobst stockings.  Musculoskeletal: Degenerative changes noted.      DIAGNOSTICS:  10/20/15: sodium 138, potassium 4.0, BUN 19, creatinine 0.86, GFR greater than 60.  9/12/16: Hgb A1C 7.5%.  3/29/17: Hgb A1C 7.3%.    Lab Results   Component Value Date    WBC 4.2 06/27/2017    HGB 9.0 (L) 06/27/2017    HCT 26.2 (L) 06/27/2017    MCV 99 06/27/2017     (L) 06/27/2017     Results for orders placed or performed in visit on 09/13/17   Basic Metabolic Panel   Result Value Ref Range    Sodium 137 136 - 145 mmol/L    Potassium 4.2 3.5 - 5.0 mmol/L    Chloride 107 98 - 107 mmol/L    CO2 23 22 - 31 mmol/L    Anion Gap, Calculation 7 5 - 18 mmol/L    Glucose 176 (H) 70 - 125 mg/dL    Calcium 9.2 8.5 - 10.5 mg/dL    BUN 28 (H) 8 - 22 mg/dL    Creatinine 1.20 (H) 0.60 - 1.10 mg/dL    GFR MDRD Af Amer 55 (L) >60 mL/min/1.73m2    GFR MDRD Non Af Amer 45 (L) >60 mL/min/1.73m2       ASSESSMENT/PLAN:  1. Sore throat. Symptomatic treatment, lozenges, monitor closely for worsening.  2. IDDM. Continue regimen.   3. HTN. BPs satisfactory. Cont current meds.   4. Neuropathy. Particularly hands and feet. On lyrica and gabapentin.  5. CAD. Hx MI and stent. Saw cardiology in May.   6. Chronic kidney disease.        Electronically signed by: Meagan Valdez MD

## 2021-06-13 NOTE — PROGRESS NOTES
"[facility]    Facility:   [facility] [[id number]]   Code Status: FULL CODE       Chief Complaint   Patient presents with     Review Of Multiple Medical Conditions     [facility] 11/30/2020. DM, HTN, Multiple debilities.        HPI:  Cristal Preciado is a 70 y.o. female who is being evaluated via a billable telephone visit.      The patient has been notified of following:     \"This telephone visit will be conducted via a call between you and your physician/provider. We have found that certain health care needs can be provided without the need for a physical exam.  This service lets us provide the care you need with a short phone conversation.  If a prescription is necessary we can send it directly to your pharmacy.  If lab work is needed we can place an order for that and you can then stop by our lab to have the test done at a later time.    (Note: Lab work can be done at the  if needed).    Telephone visits are billed at different rates depending on your insurance coverage. During this emergency period, for some insurers they may be billed the same as an in-person visit.  Please reach out to your insurance provider with any questions.    If during the course of the call the physician/provider feels a telephone visit is not appropriate, you will not be charged for this service.\"    Patient has given verbal consent to a Telephone visit? Yes      HPI:   Cristal is a 70 y.o. female seen for routine physician follow up in [facility]. She has been a resident here since October 2011. She does have multiple complex co morbidities. She is treated for hypertension and has insulin-dependent diabetes mellitus. She sees an endocrinologist. She has chronic neuropathy, chronic kidney disease and is treated with Cymbalta for depression and chronic pain. She is on gabapentin and lyrica for neuropathy pain. She has chronic weakness in her lower extremities and is wheelchair " bound. She has chronic urinary incontinence. Hospitalized in April 2018 for chest pain. It is noted she has coronary artery disease having had PCI with stent placement in 2009. Her chest pain was reproducible on exam. Negative 3 sets of troponin. EKG showed no significant ischemic changes. Pharmacological stress was negative for inducible ischemia so no intervention was required. She has periodic follow up with cardiology. She was admitted to the [facility] on 8/5/19 for planned hysterectomy. She had been experiencing brownish vaginal discharge, referred to gynecology with endometrial biopsy showing atypia. Her hospital course was uneventful, she underwent davinci assisted total laparoscopic hysterectomy with bilateral salping oophorectomy. She returned to [facility] on 8/6/19.      Today:  No significant interim concerns since my last visit. Iron was discontinued otherwise no medication changes. She continues to see endocrinology periodically due to diabetes, currently managed with toujeo, victoza and metformin. Hypertension treated with metoprolol and chlorthalidone. BPs satisfactory. She has no new complaints today. No concerns per nursing. Per charting, moods are variable though typical pattern for her. She likes to watch TV in her room. She has fragile areas of her skin in coccyx and gluteal fold, using Mepilex to protect. Has chronic pain, managed with lyrica and gabapentin, no increased concerns today. Facility is doing surveillance for Covid-19 and she has tested neg. No cough, fever or hypoxia. She is wheelchair bound, does not ambulate. She needs assist with cares due to neuropathy and difficulty using her hands.       Past Medical History:  Past Medical History:   Diagnosis Date     Acute cystitis with hematuria      Allergic rhinitis      CAD (coronary artery disease)      Carpal tunnel syndrome      Cataract      Cerebral vascular accident (H)      Chronic kidney disease      Debility      Depression       Diabetes mellitus, type II (H)      Diabetic nephropathy (H)      GERD (gastroesophageal reflux disease)      Glaucoma      Gout      History of pyelonephritis      HTN (hypertension)      Hyperlipidemia      IDDM (insulin dependent diabetes mellitus) (H)     Type 2     Lower extremity edema      Myocardial infarction (H)        Medications:  Current Outpatient Medications   Medication Sig     acetaminophen (TYLENOL) 500 MG tablet Take 1,000 mg by mouth 3 (three) times a day .           aspirin 81 mg chewable tablet Chew 81 mg daily.     atorvastatin (LIPITOR) 80 MG tablet Take 80 mg by mouth bedtime.      benzocaine-menthoL (CEPACOL) 15-3.6 mg Take 1 lozenge by mouth every 2 (two) hours as needed.     carboxymethylcellulose (REFRESH PLUS) 0.5 % Dpet ophthalmic dropperette Administer 1 drop to both eyes 3 (three) times a day.     chlorthalidone (HYGROTEN) 25 MG tablet Take 25 mg by mouth daily.      dorzolamide-timolol (COSOPT) 22.3-6.8 mg/mL ophthalmic solution Administer 1 drop to both eyes 2 (two) times a day.      DULoxetine (CYMBALTA) 60 MG capsule Take 90 mg by mouth daily.      folic acid (FOLVITE) 1 MG tablet Take 1 mg by mouth daily.     gabapentin (NEURONTIN) 300 MG capsule Take 300 mg by mouth 3 (three) times a day.      insulin glargine U-300 conc (TOUJEO MAX U-300 SOLOSTAR) 300 unit/mL (3 mL) InPn Inject 90 Units under the skin daily. 2 injections of 45 units from pen     ketoconazole (NIZORAL) 2 % shampoo Apply 1 application topically 2 (two) times a week Apply to damp skin, lather, leave on 5 minutes, and rinse. Apply on Wednesdays and Saturdays..           latanoprost (XALATAN) 0.005 % ophthalmic solution Administer 1 drop to both eyes bedtime.      liraglutide (VICTOZA) 0.6 mg/0.1 mL (18 mg/3 mL) PnIj injection Inject 1.8 mg under the skin daily. Call [phone] Dr. Washington if she develops nausea lasting >3 days.     loratadine (CLARITIN) 10 mg tablet Take 10 mg by mouth daily as needed for  allergies.     LYRICA 75 mg capsule TAKE 1 CAP BY MOUTH THREE TIMES DAILY     metFORMIN (GLUCOPHAGE-XR) 500 MG 24 hr tablet Take 1,000 mg by mouth daily.     metoprolol tartrate (LOPRESSOR) 100 MG tablet Take 100 mg by mouth 2 (two) times a day.     nitroglycerin (NITROSTAT) 0.4 MG SL tablet Place 0.4 mg under the tongue every 5 (five) minutes as needed for chest pain. 1 tablet SL Q 5 minutes up to 3 doses. Call 911 if chest pain persists.     olopatadine 0.2 % Drop Apply 1 drop to eye daily as needed.     omeprazole (PRILOSEC) 20 MG capsule Take 20 mg by mouth daily before breakfast.      senna-docusate (SENNOSIDES-DOCUSATE SODIUM) 8.6-50 mg tablet Take 2 tablets by mouth 2 (two) times a day as needed for constipation.            simethicone (MYLICON) 80 MG chewable tablet Chew 80 mg 4 (four) times a day. After meals and at HS     traMADol (ULTRAM) 50 mg tablet Take 1 tablet (50 mg total) by mouth 3 (three) times a day as needed for pain. For pain 6 or higher     triamcinolone (KENALOG) 0.1 % ointment Apply 1 application topically daily. Apply to legs in the morning  And to right ear two times a day prn               Vitals:   /78, Temp 98.3, Pulse 72, RR 18, O2 sat 99% RA.      Labs:  Lab Results   Component Value Date    HGBA1C 7.0 (H) 01/10/2019     Lab Results   Component Value Date    HGBA1C 6.9 (H) 03/18/2020     Lab Results   Component Value Date    HGBA1C 6.5 (H) 11/27/2020       Lab Results   Component Value Date    WBC 4.9 11/02/2020    HGB 10.8 (L) 11/02/2020    HCT 32.9 (L) 11/02/2020    MCV 96 11/02/2020     11/02/2020     Results for orders placed or performed in visit on 11/23/20   Basic Metabolic Panel   Result Value Ref Range    Sodium 137 136 - 145 mmol/L    Potassium 4.1 3.5 - 5.0 mmol/L    Chloride 103 98 - 107 mmol/L    CO2 23 22 - 31 mmol/L    Anion Gap, Calculation 11 5 - 18 mmol/L    Glucose 171 (H) 70 - 125 mg/dL    Calcium 10.0 8.5 - 10.5 mg/dL    BUN 17 8 - 22 mg/dL     Creatinine 0.91 0.60 - 1.10 mg/dL    GFR MDRD Af Amer >60 >60 mL/min/1.73m2    GFR MDRD Non Af Amer >60 >60 mL/min/1.73m2       Lab Results   Component Value Date    TSH 1.04 01/07/2020         Assessment/Plan:  1. IDDM. On insulin toujeo, victoza, and metformin. Continue to follow accuchecks. She sees endocrinology. Last A1c good at 6.5 %.  2. HTN. Stable on current meds metoprolol and chlorthalidone.   3. Neuropathy. Chronic pain controlled with gabapentin, lyrica and Cymbalta. No need for changes.   4. Hx of stroke. Wheelchair bound, non ambulatory. On aspirin.  5. Glaucoma. Visual impairment at baseline.  6. Depression. Continue cymbalta. Moods variable though baseline.  7. CKD. Labs as noted above.  8. Anemia. Last hgb at 10.8, improved from prior. She is no longer on iron.      Phone call duration:  8 minutes      Electronically signed by: Meagan Valdez MD

## 2021-06-13 NOTE — PROGRESS NOTES
"[facility]    Facility:   [facility] [[id number]]   Code Status: FULL CODE      CHIEF COMPLAINT/REASON FOR VISIT:  Chief Complaint   Patient presents with     Review Of Multiple Medical Conditions       HISTORY:      HPI: Cristal is a 67 y.o. female female being seen for routine visit in the long-term care facility [facility]. She has been a resident here since October 2011. She does have multiple complex co morbidities. She is treated for hypertension and has insulin-dependent diabetes mellitus. She has a endocrinologist. BS have been elevated and I did increase her AM and lunch Novolog.  She has chronic neuropathy, chronic kidney disease and is treated with Cymbalta for depression. She is on gabapentin and lyrica for neuropathy pain. She is on meds for depression.  Does require a lift for transfers. She has chronic weakness in her lower extremities.     She was seen in her room this morning. She is pleasant and compliant with exam.  She denies chest pain or shortness of breath.  SHe tells me her cough has \"slowed down a lot\".. She has open areas on her buttocks and using Mepilex for protection. She is also being followed by the wound nurse per staff.  She is  non ambulatory, has impaired mobility of both hands but does report relief with gabapentin and lyrica.      Past Medical History:   Diagnosis Date     Allergic rhinitis      CAD (coronary artery disease)      Carpal tunnel syndrome      Cataract      Cerebral vascular accident      Chronic kidney disease      Debility      Depression      Diabetes mellitus, type II      Diabetic nephropathy      Diabetic neuropathy      GERD (gastroesophageal reflux disease)      Glaucoma      Gout      History of pyelonephritis      HTN (hypertension)      Hyperlipidemia      IDDM (insulin dependent diabetes mellitus)     Type 2     Lower extremity edema      Myocardial infarction              Family History " "  Problem Relation Age of Onset     Hypertension Mother      Stroke Mother      Cancer Father      Arthritis Brother      Social History     Social History     Marital status: Single     Spouse name: N/A     Number of children: N/A     Years of education: N/A     Social History Main Topics     Smoking status: Former Smoker     Smokeless tobacco: Not on file     Alcohol use No     Drug use: No     Sexual activity: Not on file     Other Topics Concern     Not on file     Social History Narrative    10/13/2015 - The patient lives at [facility] for [facility] years.         Review of Systems   Constitutional: Positive for activity change. Negative for appetite change, chills, fatigue and fever.        Lift for transfers.    HENT: Negative for congestion and sore throat.    Respiratory: Positive for cough. Negative for shortness of breath and wheezing.         \"slowed down a lot\"   Cardiovascular: Positive for leg swelling. Negative for chest pain.        Compression stockings   Gastrointestinal: Negative for abdominal distention, abdominal pain, constipation, diarrhea and nausea.   Genitourinary: Negative for dysuria.   Musculoskeletal: Positive for arthralgias. Negative for myalgias.   Skin: Negative for color change, rash and wound.        PU buttocks   Neurological: Positive for numbness. Negative for dizziness and weakness.        Severe neuropathy bilateral hands   Psychiatric/Behavioral: Negative for agitation, behavioral problems and sleep disturbance.       .  Vitals:    11/06/17 1101   BP: (!) 135/99   Pulse: 76   Resp: 20   Temp: 98  F (36.7  C)   SpO2: 94%   Weight: (!) 240 lb (108.9 kg)       Physical Exam   Constitutional: She is oriented to person, place, and time. She appears well-developed and well-nourished.   Pleasant woman in no acute distress.   HENT:   Head: Normocephalic.   Eyes: Conjunctivae are normal.   Neck: Normal range of motion.   Cardiovascular: Normal rate, regular rhythm and normal heart " sounds.    No murmur heard.  Pulmonary/Chest: Breath sounds normal. No respiratory distress. She has no wheezes. She has no rales.   Abdominal: Soft. Bowel sounds are normal. She exhibits no distension. There is no tenderness.   Musculoskeletal: She exhibits edema.   Wearing Compression socks   Neurological: She is alert and oriented to person, place, and time.   Neuropathy bilateral hands.   Skin: Skin is warm.   Psychiatric: She has a normal mood and affect. Her behavior is normal.         LABS:   9/13/17  BMP  BUN 28  Creatinine 1.20  GFR 55    Current Outpatient Prescriptions   Medication Sig     acetaminophen (TYLENOL) 500 MG tablet Take 1,000 mg by mouth daily. Give at bedtime; also has additional pm orders. For hand pain. Don't exceed 3000mg/24hrs     acetaminophen (TYLENOL) 500 MG tablet Take 1,000 mg by mouth every 6 (six) hours as needed for pain. For pain 6-10. Don't exceed 3000mg/24hrs     ammonium lactate (AMLACTIN) 12 % cream Apply 1 application topically as needed for dry skin. Apply to feet     aspirin 81 mg chewable tablet Chew 81 mg daily.     atorvastatin (LIPITOR) 80 MG tablet Take 80 mg by mouth bedtime.      benzocaine-menthol (CEPACOL) 15-3.6 mg Take 1 lozenge by mouth every 2 (two) hours as needed.     calcium carbonate-vit D3-min 600 mg calcium- 400 unit Tab Take 1 tablet by mouth 2 (two) times a day.      chlorthalidone (HYGROTEN) 25 MG tablet Take 25 mg by mouth daily.      dorzolamide-timolol (COSOPT) 22.3-6.8 mg/mL ophthalmic solution Administer 1 drop to both eyes 2 (two) times a day.      DULoxetine (CYMBALTA) 30 MG capsule Take 30 mg by mouth daily.     ferrous sulfate 325 (65 FE) MG tablet Take 1 tablet by mouth daily with breakfast.     folic acid (FOLVITE) 1 MG tablet Take 1 mg by mouth daily.     gabapentin (NEURONTIN) 300 MG capsule Take 1,000 mg by mouth 3 (three) times a day.      insulin aspart (NOVOLOG) 100 unit/mL injection Inject 94 Units under the skin Daily before  breakfast.      insulin aspart (NOVOLOG) 100 unit/mL injection Inject 90 Units under the skin daily before lunch.      insulin aspart (NOVOLOG) 100 unit/mL injection Inject 74 Units under the skin daily before supper.      insulin glargine (LANTUS) 100 unit/mL injection Inject 100 Units under the skin 2 (two) times a day.      latanoprost (XALATAN) 0.005 % ophthalmic solution Administer 1 drop to both eyes bedtime.      liraglutide (VICTOZA) 0.6 mg/0.1 mL (18 mg/3 mL) PnIj injection Inject 1.8 mg under the skin daily. Call [phone] Dr. Washington if she develops nausea lasting >3 days.     lisinopril (PRINIVIL,ZESTRIL) 10 MG tablet Take 30 mg by mouth daily. Hold for SBP<110     loratadine (CLARITIN) 10 mg tablet Take 10 mg by mouth daily as needed for allergies.     metoprolol tartrate (LOPRESSOR) 50 MG tablet Take 100 mg by mouth 2 (two) times a day.      mineral oil Drop Administer 3 drops into both ears every morning. Every day shift, apply on Q-tip and rub into ears.     nitroglycerin (NITROSTAT) 0.4 MG SL tablet Place 0.4 mg under the tongue every 5 (five) minutes as needed for chest pain. 1 tablet SL Q 5 minutes up to 3 doses. Call [phone] if chest pain persists.     pimecrolimus (ELIDEL) 1 % cream Apply 1 application topically 2 (two) times a day as needed. Apply to both ears BID PRN for rash     pregabalin (LYRICA) 25 MG capsule Take 50 mg by mouth 3 (three) times a day.      ranitidine (ZANTAC) 150 MG tablet Take 150 mg by mouth once daily.      senna-docusate (SENNOSIDES-DOCUSATE SODIUM) 8.6-50 mg tablet Take 1 tablet by mouth daily as needed for constipation.     traMADol (ULTRAM) 50 mg tablet Take 50 mg by mouth every 8 (eight) hours as needed for pain.     triamcinolone (KENALOG) 0.1 % ointment Apply 1 application topically 2 (two) times a day.       ASSESSMENT:      ICD-10-CM    1. Diabetic peripheral neuropathy associated with type 2 diabetes mellitus E11.42    2. Stage 2 chronic kidney disease N18.2     3. Depression, unspecified depression type F32.9    4. Type 2 diabetes mellitus E11.9        PLAN:    IDDM. FS remained elevated, increase morning novolog to 94 units and lunch novolog to 90 units  continue Lantus and victoza ordered A1C  HTN. BPs stable . Cont continue lisinpril, metoprolol  Neuropathy.mainly  hands with reports of feet also . On lyrica and gabapentin.  CAD. Hx MI and stent. Saw cardiology in May.   Chronic kidney disease. Last GFR 55 and creatinine 1.20        Electronically signed by: Shawna Mesa CNP

## 2021-06-13 NOTE — PROGRESS NOTES
[facility]    Name:   Cristal Preciado  : 1950  Facility:   [facility] [[id number]]   Room: [address]  Code Status: FULL CODE -   Fac type:   NF (Long Term Care, LTC) -     CHIEF COMPLAINT / REASON FOR VISIT:  Chief Complaint   Patient presents with     Review Of Multiple Medical Conditions    Patient was last seen by me on 17 and subsequently seen by Dr. Valdez on 16, a problem-oriented visit to address complaints of a sore throat.    HPI: Cristal is a 67 y.o.  female who is alert and oriented, residing in this facility due to significant overall debility.     She has 5 children, one in particular who has mental issues.  Over the last few years, she has had family issues she has had to deal with.  She has been seen by psych here and and diagnosed with major depressive disorder (recurrent episode, moderate), mild vascular neurocognitive disorder, and adjustment disorder with anxiety.  She is receiving duloxetine (20 mg QD) for depression but also for her neuropathy, and we did increase the dose from 20 mg to 30 mg daily. She has been seen by psych (Siddhartha King, psychologist with [facility]).     She suffers from morbid obesity (BMI 42), and she is actually up about 6 pounds since my last visit.  She tells me, however, that she is eating less than she did before, and she believes the weight gain is water weight.  I do note increased lower extremity edema.    Major diagnoses are related to diabetes mellitus type II.  While she has been seen by multiple diabetic specialists (Dr. Ordonez and Lea ARGUELLES ([facility]) every 3 months, last on 17), we have been managing her blood glucose levels over the last several months.      She is receiving Victoza (1.8 mg subcu daily) and insulin, administered as Lantus 100 mg twice daily and prandial NovoLog.  At my last visit, we did increase insulin for  "breakfast and lunch.      Over the years, there has been improvement.  A hemoglobin A1c in March 2016 was 8.0%, up from 7.5% in October 2011.  Most recently, with careful management, we have gotten it down her Hgb A1C to 7.3 on 3/27/17.    Unfortunately, as a result of her diabetes, she also has diabetic peripheral neuropathy particularly in the hands and feet (\"they tingle a lot\"), and is on both Lyrica (50 mg TID) and gabapentin (now 1000 mg TID). She has told me she is getting some relief from this, but in no way is this resolving her issues.  She has told me, \"I don't have the tingling in my feet no more since taking the Lyrica,\" but she still has it in her hands and fingers.  She stated she can feel it every morning around 5 AM, and it lasts a bit longer than 30 minutes.  Keeping her hands warm (with gloves) helps.  It is significant enough that she has difficulty picking things up, and she does have adaptive utensils to allow her to eat independently (she demonstrated for me).  She also has orders for PRN tramadol. She has complained of pain in her low back and her side, but denies this today.  She tells me she takes it maybe twice per week.  In addition to the above, she also has nephropathy (chronic renal insufficiency stage II).     She has a history of carpal tunnel syndrome and still has pain in her hands/wrists.  I suggested she might benefit from braces applied while she is in bed, as they seem to hurt mostly at night, and therapy did fit her with a pair.    She also has a history of coronary artery disease (prior MI with stent), and generalized weakness with a history of stroke.  She is followed at the [facility], most recently on 05/26/17 to discuss stress test results.  I believe she is expected to follow-up this month.    She has hypertension (mostly systolic with a BP of 161/67 today) and is on 30 mg of lisinopril daily along with metoprolol tartrate 50 mg BID and 25 mg of " chlorthalidone daily.  We did increase dosing at my last visit.    She has a history of gout and had been on 300 mg of allopurinol daily, but this was reduced to 200 mg. A follow-up uric acid level on 03/01/17 was 5.7, and we were then able to reduce her allopurinol down to 100 mg. No need for any further changes at this time.    She has a chronic recurring rash/scaliness of the right ear that tends to bother her into the ear canal. She has been seen by dermatology and is receiving ketoconazole cream on bath day mixed with triamcinolone cream. She also has an order for Elidel 1% cream. The condition has improved significantly.    Per her anemia, we did check a CBC and, with a hemoglobin of 9, we will continue with iron supplementation.    She is alert and oriented ×3. She requires an EZ Stand for transfers but otherwise does not require a lot of assistance from nursing staff.        CURRENT ISSUES    She was seen by Dr. Valdez on 09/19/17 when complaining of a sore throat.  Dr. Valdez investigated and found no pharyngeal erythema or any white patches.  She was treated symptomatically with lozenges.      At my last visit, we increased her breakfast dose from 86 units to 90 units, the lunch dose from 82 units to 88 units, and kept the supper dose unchanged.   She goes on outings quite frequently, but we worked out a plan to make sure that she does not miss her insulin dosing.  Currently, blood glucose levels look excellent.  They range from 102-243, but it is rare for them to be above 180.  I will also mention that she is down 15 pounds over 7 week period.    At my last visit we addressed her hypertension and lower extremity edema by increasing her lisinopril from 20 mg to 30 mg daily and increasing her chlorthalidone from 12.5 mg to 25 mg daily.  These appear to have been fairly effective.    At my last visit we also increased her NovoLog at breakfast from 86 units to 90 units and increased NovoLog at lunch  from 82 units to 88 units.  Blood glucose levels look excellent (see above).    ROS:  No headaches, chest pains, dizziness or dyspnea, nausea or vomiting, dysuria, constipation or diarrhea.    Past Medical History:   Diagnosis Date     Allergic rhinitis      CAD (coronary artery disease)      Carpal tunnel syndrome      Cataract      Cerebral vascular accident      Chronic kidney disease      Debility      Depression      Diabetes mellitus, type II      Diabetic nephropathy      Diabetic neuropathy      GERD (gastroesophageal reflux disease)      Glaucoma      Gout      History of pyelonephritis      HTN (hypertension)      Hyperlipidemia      IDDM (insulin dependent diabetes mellitus)     Type 2     Lower extremity edema      Myocardial infarction              Family History   Problem Relation Age of Onset     Hypertension Mother      Stroke Mother      Cancer Father      Arthritis Brother      Social History     Social History     Marital status: Single     Spouse name: N/A     Number of children: N/A     Years of education: N/A     Social History Main Topics     Smoking status: Former Smoker     Smokeless tobacco: Not on file     Alcohol use No     Drug use: No     Sexual activity: Not on file     Other Topics Concern     Not on file     Social History Narrative    10/13/2015 - The patient lives at [facility] for 5 years.     MEDICATIONS: Reviewed from the MAR, physician orders, and earlier progress notes.    Current Outpatient Prescriptions   Medication Sig     acetaminophen (TYLENOL) 500 MG tablet Take 1,000 mg by mouth bedtime. Give at bedtime; also has additional pm orders. For hand pain. Don't exceed 3000mg/24hrs     acetaminophen (TYLENOL) 500 MG tablet Take 1,000 mg by mouth every 6 (six) hours as needed for pain. For pain 6-10. Don't exceed 3000mg/24hrs     ammonium lactate (AMLACTIN) 12 % cream Apply 1 application topically as needed for dry skin. Apply to feet     aspirin 81 mg chewable tablet Chew  81 mg daily.     atorvastatin (LIPITOR) 80 MG tablet Take 80 mg by mouth bedtime.      calcium carbonate-vit D3-min 600 mg calcium- 400 unit Tab Take 1 tablet by mouth 2 (two) times a day.      chlorthalidone (HYGROTEN) 25 MG tablet Take 25 mg by mouth daily.      dorzolamide-timolol (COSOPT) 22.3-6.8 mg/mL ophthalmic solution Administer 1 drop to both eyes 2 (two) times a day.      DULoxetine (CYMBALTA) 30 MG capsule Take 30 mg by mouth daily.     ferrous sulfate 325 (65 FE) MG tablet Take 1 tablet by mouth daily with breakfast.     folic acid (FOLVITE) 1 MG tablet Take 1 mg by mouth daily.     gabapentin (NEURONTIN) 300 MG capsule Take 1,000 mg by mouth 3 (three) times a day.      insulin aspart (NOVOLOG) 100 unit/mL injection Inject 15 Units under the skin as needed. With each snack in addition to sliding scale and lantus     insulin aspart (NOVOLOG) 100 unit/mL injection Inject 90 Units under the skin Daily before breakfast.      insulin aspart (NOVOLOG) 100 unit/mL injection Inject 88 Units under the skin daily before lunch.      insulin aspart (NOVOLOG) 100 unit/mL injection Inject 74 Units under the skin daily before supper.      insulin glargine (LANTUS) 100 unit/mL injection Inject 100 Units under the skin 2 (two) times a day.      latanoprost (XALATAN) 0.005 % ophthalmic solution Administer 1 drop to both eyes bedtime.      liraglutide (VICTOZA) 0.6 mg/0.1 mL (18 mg/3 mL) PnIj injection Inject 1.8 mg under the skin daily. Call [phone] Dr. Washington if she develops nausea lasting >3 days.     lisinopril (PRINIVIL,ZESTRIL) 10 MG tablet Take 30 mg by mouth daily. Hold for SBP<110     loratadine (CLARITIN) 10 mg tablet Take 10 mg by mouth daily as needed for allergies.     metoprolol tartrate (LOPRESSOR) 50 MG tablet Take 100 mg by mouth 2 (two) times a day.      mineral oil Drop Administer 3 drops into both ears every morning. Every day shift, apply on Q-tip and rub into ears.     nitroglycerin (NITROSTAT)  "0.4 MG SL tablet Place 0.4 mg under the tongue every 5 (five) minutes as needed for chest pain. 1 tablet SL Q 5 minutes up to 3 doses. Call 911 if chest pain persists.     pregabalin (LYRICA) 25 MG capsule Take 50 mg by mouth 3 (three) times a day.      ranitidine (ZANTAC) 150 MG tablet Take 150 mg by mouth once daily.      senna-docusate (SENNOSIDES-DOCUSATE SODIUM) 8.6-50 mg tablet Take 1 tablet by mouth daily as needed for constipation.     tolnaftate (ANTIFUNGAL, TOLNAFTATE,) 1 % powder Apply 1 application topically 2 (two) times a day. Apply to feet     traMADol (ULTRAM) 50 mg tablet Take 50 mg by mouth every 8 (eight) hours as needed for pain.     ALLERGIES:   Allergies   Allergen Reactions     Nuts - Unspecified      Unable To Assess      Mountain Dew per [facility] medication review report     Vitals:    10/01/17 1611   BP: 142/68   Pulse: 78   Resp: 20   Temp: 97.8  F (36.6  C)   Weight: (!) 226 lb 8 oz (102.7 kg)   Height: 5' 3\" (1.6 m)   Currently showing a significant positive weight loss of 15 pounds.  We will need to monitor this.    EXAMINATION:   General: Obese middle-aged -American female, sitting outside in her wheelchair, in no apparent distress.  Head: Normocephalic and atraumatic.   Eyes: PERRLA, sclerae clear. Left eye ptosis. Dysconjugate gaze, with the right eye deviated medially.  ENT: Moist oral mucosa. No rhinorrhea or nasal discharge. Hearing is unimpaired.  Cardiovascular: Regular rate and rhythm.  2/6 systolic ejection murmur at the left sternal border.  Respiratory: Lungs clear to auscultation bilaterally.   Abdomen: Soft and nontender.   Musculoskeletal/Extremities: Bilateral 2+ lower extremity edema.  She is wearing compression hose.  Neurologic: Difficulty with flexion of fingers bilaterally though can make a fist. Peripheral neuropathy in all extremities.  Integument: See Head and HPI.  Cognitive/Psychiatric: Alert and oriented ×3. Affect is euthymic.    DIAGNOSTICS: "   Results for orders placed or performed in visit on 09/13/17   Basic Metabolic Panel   Result Value Ref Range    Sodium 137 136 - 145 mmol/L    Potassium 4.2 3.5 - 5.0 mmol/L    Chloride 107 98 - 107 mmol/L    CO2 23 22 - 31 mmol/L    Anion Gap, Calculation 7 5 - 18 mmol/L    Glucose 176 (H) 70 - 125 mg/dL    Calcium 9.2 8.5 - 10.5 mg/dL    BUN 28 (H) 8 - 22 mg/dL    Creatinine 1.20 (H) 0.60 - 1.10 mg/dL    GFR MDRD Af Amer 55 (L) >60 mL/min/1.73m2    GFR MDRD Non Af Amer 45 (L) >60 mL/min/1.73m2     Lab Results   Component Value Date    WBC 4.2 06/27/2017    HGB 9.0 (L) 06/27/2017    HCT 26.2 (L) 06/27/2017    MCV 99 06/27/2017     (L) 06/27/2017     CrCl cannot be calculated (Patient's most recent sCr result is older than the maximum 5 days allowed.).  Lab Results   Component Value Date    HGBA1C 7.3 (H) 03/29/2017     ASSESSMENT/Plan:      ICD-10-CM    1. Type 2 diabetes mellitus E11.9    2. Diabetic peripheral neuropathy associated with type 2 diabetes mellitus E11.42    3. Morbid obesity with BMI of 40.0-44.9, adult E66.01     Z68.41    4. Bilateral lower extremity edema R60.0    5. Stage 2 chronic kidney disease N18.2    6. Depression, unspecified depression type F32.9      CHANGES:  None.    CARE PLAN:    The care plan has been reviewed and all orders signed. Changes to care plan, if any, as noted. Otherwise, continue care plan of care.       Electronically signed by:  Jesse Vivas, DANIELE

## 2021-06-14 ENCOUNTER — VIRTUAL VISIT (OUTPATIENT)
Dept: ENDOCRINOLOGY | Facility: CLINIC | Age: 71
End: 2021-06-14
Payer: COMMERCIAL

## 2021-06-14 DIAGNOSIS — E11.21 TYPE 2 DIABETES MELLITUS WITH DIABETIC NEPHROPATHY, WITH LONG-TERM CURRENT USE OF INSULIN (H): Primary | ICD-10-CM

## 2021-06-14 DIAGNOSIS — Z79.4 TYPE 2 DIABETES MELLITUS WITH DIABETIC NEPHROPATHY, WITH LONG-TERM CURRENT USE OF INSULIN (H): Primary | ICD-10-CM

## 2021-06-14 PROCEDURE — 99212 OFFICE O/P EST SF 10 MIN: CPT | Performed by: PHYSICIAN ASSISTANT

## 2021-06-14 NOTE — LETTER
2021       RE: Cristal Preciado  [facility]  [address]  [place] [address]     Dear Colleague,    Thank you for referring your patient, Cristal Preciado, to the [facility] [place] [facility]. Please see a copy of my visit note below.    Outcome for 21 8:59 AM :Unable to Leave VM phone keeps ringing     Outcome for 05/10/21 7:25 AM :Glucose sent via Email     Start time: 1110  End time:1120    HPI:   Ms. Preciado and her nurse, Rosalva are on the phone for a follow up virtual visit for long standing type 2 diabetes.   Rosalva sends glucose values every 2 weeks and we have been adjusting her insulin. At her last visit, Cristal started Jardiance and she is tolerating this well.  Her glucose has been well controlled.  Cristal was upset that this was a virtual visit.  She wants to come in person.  She is currently being managed on Toujeo 72  units every morning (switched from U-500 in 2019) and Metformin 1000 mg daily (added in ), Victoza 1.8 mg daily (several years) and Jardiance 10 mg daily.  She likes this much better than her previous regimen.  Her insulin requirements have slowly decreased since the addition of Metformin.        Recent glucose is as follows:       TSH: 1.05- 3/8/21  ALT: 35  A1c: 6.5%- 21  Microalbumin: 21- 4.99    No other concerns today.     PMH   Type 2 diabetes  Neuropathy   Nephropathy  Stroke - mainly in wheelchair. Would like to start to walk again.    CAD, s/p stent   HTN  Dyslipidemia  Cataracts  Glaucoma  GERD  Pressure sore on bottom.     Family Hx:   Mom- stroke  Dad- cancer  1 of 12 children  2 brothers and 2 sisters-  cancer- unsure of type  1 sister with diabetes, on insulin  5 children  Son- MS, milder  Daughter, April- MS  Other kids- healthy  6 grandchildren    Social Hx:   Ms. Preciado lives at [facility].  She keeps  "herself busy with many activities in the day, exercises (\"light and lively\"), crafts, coloring, baking, light bowling. She has many friends in their 90's there and she enjoys their company. She is seeing her family about 1-2 times a month now.     Has 5 children, all now living in [facility].  6 grandchildren.  Originally from [facility].     Current Medications  Current Outpatient Medications   Medication Sig Dispense Refill     acetaminophen (TYLENOL) 500 MG tablet Take 1,000 mg by mouth 3 times daily Tylenol Extra Strength       ASPIRIN LOW DOSE 81 MG chewable tablet CHEW AND SWALLOW 1 TAB BY MOUTH ONCE DAILY IN THE MORNING  99     atorvastatin (LIPITOR) 80 MG tablet Take 1 tablet by mouth daily. Pt needs to have labs done prior to further refills. (Patient taking differently: Take 80 mg by mouth At Bedtime Pt needs to have labs done prior to further refills.) 90 tablet 0     carboxymethylcellulose (REFRESH PLUS) 0.5 % SOLN 1 drop 3 times daily as needed.       CHLORTHALIDONE PO Take 25 mg by mouth every morning        dorzolamide-timolol 2-0.5 % OP ophthalmic solution Place 1 drop into both eyes 2 times daily 1 Bottle 11     DULoxetine HCl (CYMBALTA PO) Take 60 mg by mouth every morning        Elastic Bandages & Supports (JOBST KNEE HIGH COMPRESSION SM) MISC JOBST 20-30MMHG COMPRESSION SM MISC   To both legs during waking hours daily for leg swelling and venous stasis dermatitis. Do not sleep in stockings. 2 each 3     empagliflozin (JARDIANCE) 10 MG TABS tablet Take 1 tablet (10 mg) by mouth daily 30 tablet 11     ferrous sulfate (IRON) 325 (65 FE) MG tablet Take 325 mg by mouth daily (with lunch)        folic acid (FOLVITE) 1 MG tablet Take 1 mg by mouth every morning        gabapentin (NEURONTIN) 600 MG tablet Take 600 mg in AM , 600 mg afternoon and 900 mg at HS. (Patient taking differently: Take 300 mg by mouth 3 times daily ) 90 tablet 1     Insulin Glargine, 2 Unit Dial, 300 UNIT/ML SOPN Inject 72 Units " Subcutaneous daily 80 mL 3     insulin pen needle (B-D U/F) 31G X 5 MM Use 5 time(s) per day.  Please dispense as BD Pen Needle Mini U/F 31G x 5  each 11     ketoconazole (NIZORAL) 2 % shampoo To entire wet scalp and ears and then wash off after 5 minutes three times a week. (Patient taking differently: Apply topically twice a week To entire wet scalp and ears and then wash off after 5 minutes two times a week.) 240 mL 11     latanoprost (XALATAN) 0.005 % ophthalmic solution 1 drop At Bedtime. Left eye       liraglutide (VICTOZA) 18 MG/3ML SOLN Inject 1.8 mg Subcutaneous daily. Start with 0.6 mg x 5 days, then increase to 1.2 mg x 5 days, then 1.8 mg thereafter.  Do not advance to higher dose if you have nausea. (Patient taking differently: Inject 1.8 mg Subcutaneous every morning ) 3 Month 3     lisinopril (PRINIVIL,ZESTRIL) 5 MG tablet Take 2 tablets by mouth daily. Take one tab daily/Hold for SBP < 110 90 tablet 3     loratadine (CLARITIN) 10 MG tablet Take 10 mg by mouth as needed.       metFORMIN (GLUCOPHAGE-XR) 500 MG 24 hr tablet Take 2 tablets (1,000 mg) by mouth daily (with dinner) (Patient taking differently: Take 1,000 mg by mouth every morning ) 180 tablet 3     metoprolol (LOPRESSOR) 10 mg/mL SUSP Take 100 mg by mouth 2 times daily       nitroFURantoin macrocrystal-monohydrate (MACROBID) 100 MG capsule Take 1 capsule (100 mg) by mouth every 12 hours For total of 5 days       nitroFURantoin macrocrystal-monohydrate (MACROBID) 100 MG capsule Take 1 capsule (100 mg) by mouth 2 times daily 10 capsule 0     nitroGLYCERIN (NITROSTAT) 0.4 MG SL tablet Place 0.4 mg under the tongue every 5 minutes as needed.       olopatadine HCl (PATADAY) 0.2 % SOLN Place 1 drop into both eyes daily as needed For itchy eyes (Patient taking differently: Place 1 drop into both eyes as needed For itchy eyes) 1 Bottle 5     omeprazole (PRILOSEC) 20 MG DR capsule Take 20 mg by mouth       oxyCODONE (ROXICODONE) 5 MG tablet  Take 1 tablet (5 mg) by mouth every 6 hours as needed for severe pain 10 tablet 0     Pregabalin (LYRICA PO) Take 75 mg by mouth 3 times daily        ranitidine (ZANTAC) 150 MG tablet Take 150 mg by mouth 2 times daily       senna-docusate (SENOKOT-S/PERICOLACE) 8.6-50 MG tablet Take 2 tablets by mouth 2 times daily as needed for constipation 60 tablet 0     traMADol (ULTRAM) 50 MG tablet Take 1 tablet (50 mg) by mouth as needed (HOLD IF STILL TAKING OXYCODONE FOR POST OP PAIN)       triamcinolone (KENALOG) 0.1 % ointment Apply topically daily To legs under compression stockings for stasis dermatitis discoloration. 60 g 5     Past Medical History:   Diagnosis Date     AION (anterior ischaemic optic neuropathy), left eye     NAION LE     CAD (coronary artery disease) 3/2009    [facility]; Angio  UM- normal coronary arteries     Cataract      CVA (cerebral vascular accident) (H)     admitted at [facility]     on Cymbalta     Diabetes mellitus, type 2 (H)      Diabetic nephropathy (H)      Diabetic neuropathy (H)     severe     Diabetic retinopathy (H)      GERD (gastroesophageal reflux disease)      Hyperlipidemia      Hypertension     ECHO , TDS, NL EF     POAG (primary open-angle glaucoma)     adv BE     Seasonal allergies      Tubular adenoma of colon     repeat colonoscopy in        Past Surgical History:   Procedure Laterality Date      SECTION       COLONOSCOPY  7/15/2013    Tubular adenoma; repeat in ;Procedure: COMBINED COLONOSCOPY, SINGLE BIOPSY/POLYPECTOMY BY BIOPSY;;  Surgeon: Don King MD;  Tubular adenoma     COLONOSCOPY N/A 2020    Procedure: COLONOSCOPY;  Surgeon: Sid Amanda MD;  Location: [facility]     DAVINCI HYSTERECTOMY TOTAL, BILATERAL SALPINGO-OOPHORECTOMY, COMBINED N/A 2019    Procedure: DaVinci Assisted Total Laparoscopic Hysterectomy, Removal Of Both Tubes And Ovaries;  Surgeon: Linh Cardoso MD;  Location:   OR     EXTRACAPSULAR CATARACT EXTRATION WITH INTRAOCULAR LENS IMPLANT  11-10-09, 2-9-10    11-10-09 Lt, 2-9-10 Rt; Left eye 2012     STENT, CORONARY, DELORES  2009    RCA       Family History   Problem Relation Age of Onset     Hypertension Mother      Cerebrovascular Disease Mother      Glaucoma Father      Cancer Father      Diabetes Sister      Glaucoma Sister      Cancer - colorectal Other      Cerebrovascular Disease Other      Skin Cancer No family hx of      Melanoma No family hx of      Anesthesia Reaction No family hx of      Deep Vein Thrombosis (DVT) No family hx of        Social History     Socioeconomic History     Marital status: Single     Spouse name: Not on file     Number of children: Not on file     Years of education: Not on file     Highest education level: Not on file   Occupational History     Not on file   Social Needs     Financial resource strain: Not on file     Food insecurity:     Worry: Not on file     Inability: Not on file     Transportation needs:     Medical: Not on file     Non-medical: Not on file   Tobacco Use     Smoking status: Former Smoker     Packs/day: 1.50     Years: 45.00     Pack years: 67.50     Types: Cigarettes     Start date: 1968     Last attempt to quit: 3/6/2013     Years since quittin.4     Smokeless tobacco: Never Used   Substance and Sexual Activity     Alcohol use: Not Currently     Drug use: Never     Sexual activity: Not Currently   Lifestyle     Physical activity:     Days per week: Not on file     Minutes per session: Not on file     Stress: Not on file   Relationships     Social connections:     Talks on phone: Not on file     Gets together: Not on file     Attends Restorationist service: Not on file     Active member of club or organization: Not on file     Attends meetings of clubs or organizations: Not on file     Relationship status: Not on file     Intimate partner violence:     Fear of current or ex partner: Not on file     Emotionally abused: Not  on file     Physically abused: Not on file     Forced sexual activity: Not on file   Other Topics Concern     Parent/sibling w/ CABG, MI or angioplasty before 65F 55M? Not Asked   Social History Narrative    Pt has three daughters and two sons, single.  Her daughter Court, see's her often at the Nursing Home ([facility]  Moved into Nursing Home in October 2011 after a hospitalization for ALY.  Moved from [place] in 2002 to [facility]. Has 5 grandchildren.       Physical Exam   There were no vitals taken for this visit.   GENERAL: healthy, alert and no distress  RESP: no audible wheeze, cough.  No visible retractions or increased work of breathing.  Able to speak fully in complete sentences.  PSYCH: mentation appears normal, affect normal/bright, judgement and insight intact, normal speech and appearance well-groomed    RESULTS  Lab Results   Component Value Date    A1C 6.6 (H) 07/23/2019    A1C 7.5 (H) 10/24/2016    A1C 7.5 (H) 10/12/2015    A1C 8.1 (H) 03/06/2014    A1C 7.2 (H) 03/07/2013    HEMOGLOBINA1 6.6 (A) 01/06/2020    HEMOGLOBINA1 6.5 (A) 05/24/2019    HEMOGLOBINA1 7.1 (A) 10/10/2018    HEMOGLOBINA1 7.1 (A) 03/16/2018    HEMOGLOBINA1 7.3 (A) 06/05/2017    HEMOGLOBINA1 7.3 (A) 06/05/2017       TSH   Date Value Ref Range Status   10/24/2016 1.20 0.40 - 4.00 mU/L Final   10/12/2015 1.18 0.40 - 4.00 mU/L Final   08/21/2014 1.24 0.40 - 4.00 mU/L Final     Comment:     Effective 7/30/2014, the reference range for this assay has changed to reflect   new instrumentation/methodology.     08/05/2013 1.12 0.4 - 5.0 mU/L Final   07/15/2010 0.53 0.4 - 5.0 mU/L Final     T4 Total   Date Value Ref Range Status   10/30/2008 10.6 5.0 - 11.0 ug/dL Final     T4 Free   Date Value Ref Range Status   04/21/2006 1.04 0.70 - 1.85 ng/dL Final   09/23/2005 1.58 0.70 - 1.85 ng/dL Final       ALT   Date Value Ref Range Status   10/12/2015 39 0 - 50 U/L Final   03/06/2014 36 0 - 50 U/L Final   ]    Recent Labs   Lab Test  03/19/18 10/24/16  1301 10/12/15  1626 08/21/14  0935   CHOL 109  --   --  110   HDL  --   --   --  43*   LDL  --  48 51 43   TRIG  --   --   --  122   CHOLHDLRATIO  --   --   --  2.6       Lab Results   Component Value Date     08/06/2019      Lab Results   Component Value Date    POTASSIUM 4.8 08/06/2019     Lab Results   Component Value Date    CHLORIDE 104 08/06/2019     Lab Results   Component Value Date    OLAF 8.8 08/06/2019     Lab Results   Component Value Date    CO2 19 08/06/2019     Lab Results   Component Value Date    BUN 24 08/06/2019     Lab Results   Component Value Date    CR 0.98 08/06/2019       GFR Estimate   Date Value Ref Range Status   08/06/2019 59 (L) >60 mL/min/[1.73_m2] Final     Comment:     Non  GFR Calc  Starting 12/18/2018, serum creatinine based estimated GFR (eGFR) will be   calculated using the Chronic Kidney Disease Epidemiology Collaboration   (CKD-EPI) equation.     08/05/2019 50 (L) >60 mL/min/[1.73_m2] Final     Comment:     Non  GFR Calc  Starting 12/18/2018, serum creatinine based estimated GFR (eGFR) will be   calculated using the Chronic Kidney Disease Epidemiology Collaboration   (CKD-EPI) equation.     07/23/2019 48 (L) >60 mL/min/[1.73_m2] Final     Comment:     Non  GFR Calc  Starting 12/18/2018, serum creatinine based estimated GFR (eGFR) will be   calculated using the Chronic Kidney Disease Epidemiology Collaboration   (CKD-EPI) equation.       GFR Estimate If Black   Date Value Ref Range Status   08/06/2019 69 >60 mL/min/[1.73_m2] Final     Comment:      GFR Calc  Starting 12/18/2018, serum creatinine based estimated GFR (eGFR) will be   calculated using the Chronic Kidney Disease Epidemiology Collaboration   (CKD-EPI) equation.     08/05/2019 58 (L) >60 mL/min/[1.73_m2] Final     Comment:      GFR Calc  Starting 12/18/2018, serum creatinine based estimated GFR (eGFR) will be    calculated using the Chronic Kidney Disease Epidemiology Collaboration   (CKD-EPI) equation.     07/23/2019 56 (L) >60 mL/min/[1.73_m2] Final     Comment:      GFR Calc  Starting 12/18/2018, serum creatinine based estimated GFR (eGFR) will be   calculated using the Chronic Kidney Disease Epidemiology Collaboration   (CKD-EPI) equation.         Lab Results   Component Value Date    MICROL <5 10/12/2015     No results found for: MICROALBUMIN  No results found for: CPEPT, GADAB, ISCAB    Vitamin B12   Date Value Ref Range Status   08/05/2013 506 >210 pg/mL Final     Comment:     Interp: 247-911 = Normal   ]    Most recent eye exam date: : Not Found     Care Everywhere Results:   A1c: 6.9% 3/18/20  TSH: 1.04 1/7/20  GFR 53  9/27/20  Creatinine: 1.22 9/27/20  LDL: 45 8/27/19    Assessment/Plan:      1.  Type 2 diabetes-  Ms. Patton glucose is well controlled at present.  Will check BMP and plan to increase dose of Jardiance to 25 mg and reduce insulin if ok.  For now, I have advised reducing Toujeo to 62 units.  No other changes.  They will fax glucose in 2 weeks.     2.  Risk factors- BP is well controlled.  She does have microalbuminuria (54.1 11/23/20).  She is on gabapentin and cymbalta for neuropathy. GFR >60 in 11/20.  TSH normal in January, 2020. LDL 45    3. F/U in 3 months in person with me, sooner with concerns.      17  minutes spent on the date of the encounter doing chart review, review of test results, review and interpretation of glucose data, patient visit and documentation, counseling/coordination of care, and discussion of follow up plan for worsening hyper and hypoglycemia.  The patient understood and is satisfied with today's visit.     Renetta Washington PA-C, MPAS   [facility]  Department of Medicine  Division of Endocrinology and Diabetes

## 2021-06-14 NOTE — PROGRESS NOTES
[facility]    Facility:   [facility] [[id number]]   Code Status: FULL CODE      CHIEF COMPLAINT/REASON FOR VISIT:  Chief Complaint   Patient presents with     P Care Coordination - Regulatory       HISTORY:      HPI: Cristal is a 70 y.o. female residing  in the long-term care facility [facility]. She has been a resident here since October 2011. She does have multiple complex co- morbidities. She is treated for hypertension and has insulin-dependent diabetes mellitus. She has a endocrinologist who follows her  blood sugars regularly.  She has chronic neuropathy, chronic kidney disease and is treated with Cymbalta for depression. She is on gabapentin and lyrica for neuropathy pain. She is on meds for depression.  Does require a lift for transfers. She has chronic weakness in her lower extremities.      Today she is seen for a routine  Regulatory visit .  She is with a  hysterectomy October 2019.   She denies chest pain or shortness of breath.  She denies cough or congestion.  She is  non ambulatory, She is on  gabapentin and lyrica for neuropathy.. Her BS are being controlled by her endocrinologist and being sent in every couple of weeks.   She reports a dry cough, denies Congestion. She is having no pain . No open areas on her skin. Her weights were reviewed and she is down 6.5 pounds over the last month     Past Medical History:   Diagnosis Date     Acute cystitis with hematuria      Allergic rhinitis      CAD (coronary artery disease)      Carpal tunnel syndrome      Cataract      Cerebral vascular accident (H)      Chronic kidney disease      Debility      Depression      Diabetes mellitus, type II (H)      Diabetic nephropathy (H)      GERD (gastroesophageal reflux disease)      Glaucoma      Gout      History of pyelonephritis      HTN (hypertension)      Hyperlipidemia      IDDM (insulin dependent diabetes mellitus) (H)     Type 2     Lower extremity  edema      Myocardial infarction (H)              Family History   Problem Relation Age of Onset     Hypertension Mother      Stroke Mother      Cancer Father      Glaucoma Father      Arthritis Brother      Diabetes Sister      Glaucoma Sister      Social History     Socioeconomic History     Marital status: Single     Spouse name: Not on file     Number of children: Not on file     Years of education: Not on file     Highest education level: Not on file   Occupational History     Not on file   Social Needs     Financial resource strain: Not on file     Food insecurity     Worry: Not on file     Inability: Not on file     Transportation needs     Medical: Not on file     Non-medical: Not on file   Tobacco Use     Smoking status: Former Smoker     Smokeless tobacco: Never Used   Substance and Sexual Activity     Alcohol use: No     Drug use: No     Sexual activity: Not on file   Lifestyle     Physical activity     Days per week: Not on file     Minutes per session: Not on file     Stress: Not on file   Relationships     Social connections     Talks on phone: Not on file     Gets together: Not on file     Attends Cheondoism service: Not on file     Active member of club or organization: Not on file     Attends meetings of clubs or organizations: Not on file     Relationship status: Not on file     Intimate partner violence     Fear of current or ex partner: Not on file     Emotionally abused: Not on file     Physically abused: Not on file     Forced sexual activity: Not on file   Other Topics Concern     Not on file   Social History Narrative    10/13/2015 - The patient lives at [facility] for 5 years.         Review of Systems  Constitutional: Positive for activity change. Negative for appetite change, chills, fatigue and fever.        Lift for transfers.    HENT: Negative for congestion and sore throat.    Respiratory: Negative for shortness of breath and wheezing.    Cardiovascular: negative for leg swelling .  Negative for chest pain. (intermittent Right  upper chest discomfort. She was seen by cardiology and no known cause found. Resident reports relief with Tylenol)       Compression stockings   Gastrointestinal: Negative for abdominal distention, abdominal pain, constipation, diarrhea and nausea.   Genitourinary: Negative for dysuria.   Musculoskeletal: Positive for arthralgias. Negative for myalgias.   Skin: Negative for color change, rash and wound.   Neurological: Positive for numbness. Negative for dizziness and weakness.        Severe neuropathy bilateral hands   Psychiatric/Behavioral: Negative for agitation, behavioral problems and sleep disturbance.   Vitals:    01/06/21 1125   BP: 128/76   Pulse: 74   Resp: 18   Temp: 98  F (36.7  C)   SpO2: 97%   Weight: 173 lb 8 oz (78.7 kg)       Physical Exam    Abdominal: She exhibits distension.  Abdomen is soft  Skin:    healed old lap sites on abdomen     Constitutional: She is oriented to person, place, and time. She appears well-developed and well-nourished.   Pleasant woman in no acute distress.   HENT:   Head: Normocephalic.   Eyes: Conjunctivae are normal.   Neck: Normal range of motion.   Cardiovascular: Normal rate, regular rhythm and normal heart sounds.   No murmur heard.  Pulmonary/Chest: Breath sounds normal. No respiratory distress. She has no wheezes. She has no rales.   Abdominal: Soft. Bowel sounds are normal. She exhibits no distension. There is no tenderness.   Musculoskeletal: She exhibits edema.   Wearing Compression socks   Neurological: She is alert and oriented to person, place, and time.   Neuropathy bilateral hands.   Skin: Skin is warm.   Psychiatric: She has a normal mood and affect. Her behavior is normal  LABS:   No results found for this or any previous visit (from the past 240 hour(s)).  Case Management:  I have reviewed the facility/SNF care plan/MDS which was done 11/3/21 including the falls risk, nutrition and pain screening. I also  reviewed the current immunizations, and preventive care..She is up to date on tests. Patient's desire to return to the community is not present.    Information reviewed:  Medications, vital signs, orders, and nursing notes.    ASSESSMENT:      ICD-10-CM    1. Type 2 diabetes mellitus with hyperglycemia, with long-term current use of insulin (H)  E11.65     Z79.4    2. Essential hypertension  I10    3. Physical deconditioning  R53.81        PLAN:       IDDM. FS are stable last A1C 6.9 on 3/18/20 Endocrinologist following , Continue current diabetic medications as above.       HTN. Continue  Lisinpril and metoprolol-stable     Neuropathy   On lyrica and gabapentin.     CAD. HX  MI and stent.      S/p Hysterectomy in October 2019      Electronically signed by: Shawna Mesa CNP

## 2021-06-14 NOTE — PROGRESS NOTES
"Outcome for 21 8:59 AM :Unable to Leave VM phone keeps ringing     Outcome for 05/10/21 7:25 AM :Glucose sent via Email     Start time: 1110  End time:1120    HPI:   Ms. Preciado and her nurse, Rosalva are on the phone for a follow up virtual visit for long standing type 2 diabetes.   Rosalva sends glucose values every 2 weeks and we have been adjusting her insulin. At her last visit, Cristal started Jardiance and she is tolerating this well.  Her glucose has been well controlled.  Cristal was upset that this was a virtual visit.  She wants to come in person.  She is currently being managed on Toujeo 72  units every morning (switched from U-500 in ) and Metformin 1000 mg daily (added in ), Victoza 1.8 mg daily (several years) and Jardiance 10 mg daily.  She likes this much better than her previous regimen.  Her insulin requirements have slowly decreased since the addition of Metformin.        Recent glucose is as follows:       TSH: 1.05- 3/8/21  ALT: 35  A1c: 6.5%- 21  Microalbumin: 21- 4.99    No other concerns today.     PMH   Type 2 diabetes  Neuropathy   Nephropathy  Stroke - mainly in wheelchair. Would like to start to walk again.    CAD, s/p stent   HTN  Dyslipidemia  Cataracts  Glaucoma  GERD  Pressure sore on bottom.     Family Hx:   Mom- stroke  Dad- cancer  1 of 12 children  2 brothers and 2 sisters-  cancer- unsure of type  1 sister with diabetes, on insulin  5 children  Son- MS, milder  Daughter, April- MS  Other kids- healthy  6 grandchildren    Social Hx:   Ms. Preciado lives at [facility].  She keeps herself busy with many activities in the day, exercises (\"light and lively\"), crafts, coloring, baking, light bowling. She has many friends in their 90's there and she enjoys their company. She is seeing her family about 1-2 times a month now.     Has 5 children, all now living in [facility].  6 grandchildren.  Originally from [facility].     Current " Medications  Current Outpatient Medications   Medication Sig Dispense Refill     acetaminophen (TYLENOL) 500 MG tablet Take 1,000 mg by mouth 3 times daily Tylenol Extra Strength       ASPIRIN LOW DOSE 81 MG chewable tablet CHEW AND SWALLOW 1 TAB BY MOUTH ONCE DAILY IN THE MORNING  99     atorvastatin (LIPITOR) 80 MG tablet Take 1 tablet by mouth daily. Pt needs to have labs done prior to further refills. (Patient taking differently: Take 80 mg by mouth At Bedtime Pt needs to have labs done prior to further refills.) 90 tablet 0     carboxymethylcellulose (REFRESH PLUS) 0.5 % SOLN 1 drop 3 times daily as needed.       CHLORTHALIDONE PO Take 25 mg by mouth every morning        dorzolamide-timolol 2-0.5 % OP ophthalmic solution Place 1 drop into both eyes 2 times daily 1 Bottle 11     DULoxetine HCl (CYMBALTA PO) Take 60 mg by mouth every morning        Elastic Bandages & Supports (JOBST KNEE HIGH COMPRESSION SM) MISC JOBST 20-30MMHG COMPRESSION SM MISC   To both legs during waking hours daily for leg swelling and venous stasis dermatitis. Do not sleep in stockings. 2 each 3     empagliflozin (JARDIANCE) 10 MG TABS tablet Take 1 tablet (10 mg) by mouth daily 30 tablet 11     ferrous sulfate (IRON) 325 (65 FE) MG tablet Take 325 mg by mouth daily (with lunch)        folic acid (FOLVITE) 1 MG tablet Take 1 mg by mouth every morning        gabapentin (NEURONTIN) 600 MG tablet Take 600 mg in AM , 600 mg afternoon and 900 mg at HS. (Patient taking differently: Take 300 mg by mouth 3 times daily ) 90 tablet 1     Insulin Glargine, 2 Unit Dial, 300 UNIT/ML SOPN Inject 72 Units Subcutaneous daily 80 mL 3     insulin pen needle (B-D U/F) 31G X 5 MM Use 5 time(s) per day.  Please dispense as BD Pen Needle Mini U/F 31G x 5  each 11     ketoconazole (NIZORAL) 2 % shampoo To entire wet scalp and ears and then wash off after 5 minutes three times a week. (Patient taking differently: Apply topically twice a week To entire wet  scalp and ears and then wash off after 5 minutes two times a week.) 240 mL 11     latanoprost (XALATAN) 0.005 % ophthalmic solution 1 drop At Bedtime. Left eye       liraglutide (VICTOZA) 18 MG/3ML SOLN Inject 1.8 mg Subcutaneous daily. Start with 0.6 mg x 5 days, then increase to 1.2 mg x 5 days, then 1.8 mg thereafter.  Do not advance to higher dose if you have nausea. (Patient taking differently: Inject 1.8 mg Subcutaneous every morning ) 3 Month 3     lisinopril (PRINIVIL,ZESTRIL) 5 MG tablet Take 2 tablets by mouth daily. Take one tab daily/Hold for SBP < 110 90 tablet 3     loratadine (CLARITIN) 10 MG tablet Take 10 mg by mouth as needed.       metFORMIN (GLUCOPHAGE-XR) 500 MG 24 hr tablet Take 2 tablets (1,000 mg) by mouth daily (with dinner) (Patient taking differently: Take 1,000 mg by mouth every morning ) 180 tablet 3     metoprolol (LOPRESSOR) 10 mg/mL SUSP Take 100 mg by mouth 2 times daily       nitroFURantoin macrocrystal-monohydrate (MACROBID) 100 MG capsule Take 1 capsule (100 mg) by mouth every 12 hours For total of 5 days       nitroFURantoin macrocrystal-monohydrate (MACROBID) 100 MG capsule Take 1 capsule (100 mg) by mouth 2 times daily 10 capsule 0     nitroGLYCERIN (NITROSTAT) 0.4 MG SL tablet Place 0.4 mg under the tongue every 5 minutes as needed.       olopatadine HCl (PATADAY) 0.2 % SOLN Place 1 drop into both eyes daily as needed For itchy eyes (Patient taking differently: Place 1 drop into both eyes as needed For itchy eyes) 1 Bottle 5     omeprazole (PRILOSEC) 20 MG DR capsule Take 20 mg by mouth       oxyCODONE (ROXICODONE) 5 MG tablet Take 1 tablet (5 mg) by mouth every 6 hours as needed for severe pain 10 tablet 0     Pregabalin (LYRICA PO) Take 75 mg by mouth 3 times daily        ranitidine (ZANTAC) 150 MG tablet Take 150 mg by mouth 2 times daily       senna-docusate (SENOKOT-S/PERICOLACE) 8.6-50 MG tablet Take 2 tablets by mouth 2 times daily as needed for constipation 60 tablet 0      traMADol (ULTRAM) 50 MG tablet Take 1 tablet (50 mg) by mouth as needed (HOLD IF STILL TAKING OXYCODONE FOR POST OP PAIN)       triamcinolone (KENALOG) 0.1 % ointment Apply topically daily To legs under compression stockings for stasis dermatitis discoloration. 60 g 5     Past Medical History:   Diagnosis Date     AION (anterior ischaemic optic neuropathy), left eye     NAION LE     CAD (coronary artery disease) 3/2009    [facility]; Angio  UM- normal coronary arteries     Cataract      CVA (cerebral vascular accident) (H)     admitted at [facility]     on Cymbalta     Diabetes mellitus, type 2 (H)      Diabetic nephropathy (H)      Diabetic neuropathy (H)     severe     Diabetic retinopathy (H)      GERD (gastroesophageal reflux disease)      Hyperlipidemia      Hypertension     ECHO , TDS, NL EF     POAG (primary open-angle glaucoma)     adv BE     Seasonal allergies      Tubular adenoma of colon     repeat colonoscopy in        Past Surgical History:   Procedure Laterality Date      SECTION       COLONOSCOPY  7/15/2013    Tubular adenoma; repeat in ;Procedure: COMBINED COLONOSCOPY, SINGLE BIOPSY/POLYPECTOMY BY BIOPSY;;  Surgeon: Don King MD;  Tubular adenoma     COLONOSCOPY N/A 2020    Procedure: COLONOSCOPY;  Surgeon: Sid Aamnda MD;  Location: [facility] GI     DAVINCI HYSTERECTOMY TOTAL, BILATERAL SALPINGO-OOPHORECTOMY, COMBINED N/A 2019    Procedure: DaVinci Assisted Total Laparoscopic Hysterectomy, Removal Of Both Tubes And Ovaries;  Surgeon: Linh Cardoso MD;  Location: [facility] OR     EXTRACAPSULAR CATARACT EXTRATION WITH INTRAOCULAR LENS IMPLANT  11-10-09, 2-9-10    11-10-09 Lt, 2-9-10 Rt; Left eye 2012     STENT, CORONARY, DELORES  2009    RCA       Family History   Problem Relation Age of Onset     Hypertension Mother      Cerebrovascular Disease Mother      Glaucoma Father      Cancer Father      Diabetes Sister      Glaucoma  Sister      Cancer - colorectal Other      Cerebrovascular Disease Other      Skin Cancer No family hx of      Melanoma No family hx of      Anesthesia Reaction No family hx of      Deep Vein Thrombosis (DVT) No family hx of        Social History     Socioeconomic History     Marital status: Single     Spouse name: Not on file     Number of children: Not on file     Years of education: Not on file     Highest education level: Not on file   Occupational History     Not on file   Social Needs     Financial resource strain: Not on file     Food insecurity:     Worry: Not on file     Inability: Not on file     Transportation needs:     Medical: Not on file     Non-medical: Not on file   Tobacco Use     Smoking status: Former Smoker     Packs/day: 1.50     Years: 45.00     Pack years: 67.50     Types: Cigarettes     Start date: 1968     Last attempt to quit: 3/6/2013     Years since quittin.4     Smokeless tobacco: Never Used   Substance and Sexual Activity     Alcohol use: Not Currently     Drug use: Never     Sexual activity: Not Currently   Lifestyle     Physical activity:     Days per week: Not on file     Minutes per session: Not on file     Stress: Not on file   Relationships     Social connections:     Talks on phone: Not on file     Gets together: Not on file     Attends Ca[employer] service: Not on file     Active member of club or organization: Not on file     Attends meetings of clubs or organizations: Not on file     Relationship status: Not on file     Intimate partner violence:     Fear of current or ex partner: Not on file     Emotionally abused: Not on file     Physically abused: Not on file     Forced sexual activity: Not on file   Other Topics Concern     Parent/sibling w/ CABG, MI or angioplasty before 65F 55M? Not Asked   Social History Narrative    Pt has three daughters and two sons, single.  Her daughter Court, see's her often at the Nursing Home ([facility]).  Moved into Nursing Home  in October 2011 after a hospitalization for ALY.  Moved from [place] in 2002 to [facility]. Has 5 grandchildren.       Physical Exam   There were no vitals taken for this visit.   GENERAL: healthy, alert and no distress  RESP: no audible wheeze, cough.  No visible retractions or increased work of breathing.  Able to speak fully in complete sentences.  PSYCH: mentation appears normal, affect normal/bright, judgement and insight intact, normal speech and appearance well-groomed    RESULTS  Lab Results   Component Value Date    A1C 6.6 (H) 07/23/2019    A1C 7.5 (H) 10/24/2016    A1C 7.5 (H) 10/12/2015    A1C 8.1 (H) 03/06/2014    A1C 7.2 (H) 03/07/2013    HEMOGLOBINA1 6.6 (A) 01/06/2020    HEMOGLOBINA1 6.5 (A) 05/24/2019    HEMOGLOBINA1 7.1 (A) 10/10/2018    HEMOGLOBINA1 7.1 (A) 03/16/2018    HEMOGLOBINA1 7.3 (A) 06/05/2017    HEMOGLOBINA1 7.3 (A) 06/05/2017       TSH   Date Value Ref Range Status   10/24/2016 1.20 0.40 - 4.00 mU/L Final   10/12/2015 1.18 0.40 - 4.00 mU/L Final   08/21/2014 1.24 0.40 - 4.00 mU/L Final     Comment:     Effective 7/30/2014, the reference range for this assay has changed to reflect   new instrumentation/methodology.     08/05/2013 1.12 0.4 - 5.0 mU/L Final   07/15/2010 0.53 0.4 - 5.0 mU/L Final     T4 Total   Date Value Ref Range Status   10/30/2008 10.6 5.0 - 11.0 ug/dL Final     T4 Free   Date Value Ref Range Status   04/21/2006 1.04 0.70 - 1.85 ng/dL Final   09/23/2005 1.58 0.70 - 1.85 ng/dL Final       ALT   Date Value Ref Range Status   10/12/2015 39 0 - 50 U/L Final   03/06/2014 36 0 - 50 U/L Final   ]    Recent Labs   Lab Test 03/19/18 10/24/16  1301 10/12/15  1626 08/21/14  0935   CHOL 109  --   --  110   HDL  --   --   --  43*   LDL  --  48 51 43   TRIG  --   --   --  122   CHOLHDLRATIO  --   --   --  2.6       Lab Results   Component Value Date     08/06/2019      Lab Results   Component Value Date    POTASSIUM 4.8 08/06/2019     Lab Results   Component Value Date     CHLORIDE 104 08/06/2019     Lab Results   Component Value Date    OLAF 8.8 08/06/2019     Lab Results   Component Value Date    CO2 19 08/06/2019     Lab Results   Component Value Date    BUN 24 08/06/2019     Lab Results   Component Value Date    CR 0.98 08/06/2019       GFR Estimate   Date Value Ref Range Status   08/06/2019 59 (L) >60 mL/min/[1.73_m2] Final     Comment:     Non  GFR Calc  Starting 12/18/2018, serum creatinine based estimated GFR (eGFR) will be   calculated using the Chronic Kidney Disease Epidemiology Collaboration   (CKD-EPI) equation.     08/05/2019 50 (L) >60 mL/min/[1.73_m2] Final     Comment:     Non  GFR Calc  Starting 12/18/2018, serum creatinine based estimated GFR (eGFR) will be   calculated using the Chronic Kidney Disease Epidemiology Collaboration   (CKD-EPI) equation.     07/23/2019 48 (L) >60 mL/min/[1.73_m2] Final     Comment:     Non  GFR Calc  Starting 12/18/2018, serum creatinine based estimated GFR (eGFR) will be   calculated using the Chronic Kidney Disease Epidemiology Collaboration   (CKD-EPI) equation.       GFR Estimate If Black   Date Value Ref Range Status   08/06/2019 69 >60 mL/min/[1.73_m2] Final     Comment:      GFR Calc  Starting 12/18/2018, serum creatinine based estimated GFR (eGFR) will be   calculated using the Chronic Kidney Disease Epidemiology Collaboration   (CKD-EPI) equation.     08/05/2019 58 (L) >60 mL/min/[1.73_m2] Final     Comment:      GFR Calc  Starting 12/18/2018, serum creatinine based estimated GFR (eGFR) will be   calculated using the Chronic Kidney Disease Epidemiology Collaboration   (CKD-EPI) equation.     07/23/2019 56 (L) >60 mL/min/[1.73_m2] Final     Comment:      GFR Calc  Starting 12/18/2018, serum creatinine based estimated GFR (eGFR) will be   calculated using the Chronic Kidney Disease Epidemiology Collaboration   (CKD-EPI) equation.          Lab Results   Component Value Date    MICROL <5 10/12/2015     No results found for: MICROALBUMIN  No results found for: CPEPT, GADAB, ISCAB    Vitamin B12   Date Value Ref Range Status   08/05/2013 506 >210 pg/mL Final     Comment:     Interp: 247-911 = Normal   ]    Most recent eye exam date: : Not Found     Care Everywhere Results:   A1c: 6.9% 3/18/20  TSH: 1.04 1/7/20  GFR 53  9/27/20  Creatinine: 1.22 9/27/20  LDL: 45 8/27/19    Assessment/Plan:      1.  Type 2 diabetes-  Ms. Patton glucose is well controlled at present.  Will check BMP and plan to increase dose of Jardiance to 25 mg and reduce insulin if ok.  For now, I have advised reducing Toujeo to 62 units.  No other changes.  They will fax glucose in 2 weeks.     2.  Risk factors- BP is well controlled.  She does have microalbuminuria (54.1 11/23/20).  She is on gabapentin and cymbalta for neuropathy. GFR >60 in 11/20.  TSH normal in January, 2020. LDL 45    3. F/U in 3 months in person with me, sooner with concerns.      17  minutes spent on the date of the encounter doing chart review, review of test results, review and interpretation of glucose data, patient visit and documentation, counseling/coordination of care, and discussion of follow up plan for worsening hyper and hypoglycemia.  The patient understood and is satisfied with today's visit.     Renetta Washington PA-C, MPAS   [facility]  Department of Medicine  Division of Endocrinology and Diabetes

## 2021-06-15 NOTE — TELEPHONE ENCOUNTER
[facility] Nurse Triage Telephone Note      Provider: DANYELLE Wesley  Facility: [facility] Type: LTC    Caller: Rosalva  Call Back Number:  [phone]    Allergies: Nuts - unspecified and Unable to assess    Reason for call: Hgb 10.5, Ferritin 449 (399) not on Iron     Verbal Order/Direction given by Provider: JEFFERY    Provider giving order: DANYELLE Wesley    Verbal order given to: Tere Reis RN

## 2021-06-15 NOTE — PROGRESS NOTES
[facility]    Facility:   [facility] [[id number]]   Code Status: FULL CODE       Chief Complaint   Patient presents with     Review Of Multiple Medical Conditions     [facility] 1/31/18.       HPI:  Cristal Preciado is a 67-year-old female seen for routine physician follow-up in the long-term care facility [facility]. She has been a resident here since October 2011 but we took over her in-house care as of September 2015. She does have multiple complex co morbidities. She is treated for hypertension and has insulin-dependent diabetes mellitus. She sees an endocrinologist. She has chronic neuropathy, chronic kidney disease and is treated with Cymbalta for depression and chronic pain.. She is on gabapentin and lyrica for neuropathy pain. She is on meds for depression. She has chronic weakness in her lower extremities.     She has been stable. Complains of various arthritic and neuropathic type pains, well managed with current regimen per nurses as she does not ask for prn meds. No cough or chest congestion. No chest pain. Recent influenza in the facility so she just completed preventative course of Tamiflu. Sits much of the day in wheelchair. Non ambulatory. Uses lift. Signif degen changes, impaired mobility of both hands, often wears edema gloves.       PAST MEDICAL HISTORY:  Past Medical History:   Diagnosis Date     Allergic rhinitis      CAD (coronary artery disease)      Carpal tunnel syndrome      Cataract      Cerebral vascular accident      Chronic kidney disease      Debility      Depression      Diabetes mellitus, type II      Diabetic nephropathy      Diabetic neuropathy      GERD (gastroesophageal reflux disease)      Glaucoma      Gout      History of pyelonephritis      HTN (hypertension)      Hyperlipidemia      IDDM (insulin dependent diabetes mellitus)     Type 2     Lower extremity edema      Myocardial infarction        MEDICATIONS:  Current  Outpatient Prescriptions   Medication Sig     acetaminophen (TYLENOL) 500 MG tablet Take 1,000 mg by mouth daily. Give at bedtime; also has additional pm orders. For hand pain. Don't exceed 3000mg/24hrs     acetaminophen (TYLENOL) 500 MG tablet Take 1,000 mg by mouth every 6 (six) hours as needed for pain. For pain 6-10. Don't exceed 3000mg/24hrs     ammonium lactate (AMLACTIN) 12 % cream Apply 1 application topically as needed for dry skin. Apply to feet     aspirin 81 mg chewable tablet Chew 81 mg daily.     atorvastatin (LIPITOR) 80 MG tablet Take 80 mg by mouth bedtime.      benzocaine-menthol (CEPACOL) 15-3.6 mg Take 1 lozenge by mouth every 2 (two) hours as needed.     calcium carbonate-vit D3-min 600 mg calcium- 400 unit Tab Take 1 tablet by mouth 2 (two) times a day.      chlorthalidone (HYGROTEN) 25 MG tablet Take 25 mg by mouth daily.      dorzolamide-timolol (COSOPT) 22.3-6.8 mg/mL ophthalmic solution Administer 1 drop to both eyes 2 (two) times a day.      DULoxetine (CYMBALTA) 30 MG capsule Take 30 mg by mouth daily.     ferrous sulfate 325 (65 FE) MG tablet Take 1 tablet by mouth daily with breakfast.     folic acid (FOLVITE) 1 MG tablet Take 1 mg by mouth daily.     gabapentin (NEURONTIN) 300 MG capsule Take 1,000 mg by mouth 3 (three) times a day.      insulin aspart (NOVOLOG) 100 unit/mL injection Inject 94 Units under the skin Daily before breakfast.      insulin aspart (NOVOLOG) 100 unit/mL injection Inject 90 Units under the skin daily before lunch.      insulin aspart (NOVOLOG) 100 unit/mL injection Inject 74 Units under the skin daily before supper.      insulin glargine (LANTUS) 100 unit/mL injection Inject 100 Units under the skin 2 (two) times a day.      latanoprost (XALATAN) 0.005 % ophthalmic solution Administer 1 drop to both eyes bedtime.      liraglutide (VICTOZA) 0.6 mg/0.1 mL (18 mg/3 mL) PnIj injection Inject 1.8 mg under the skin daily. Call [phone] Dr. Washington if she develops  nausea lasting >3 days.     lisinopril (PRINIVIL,ZESTRIL) 10 MG tablet Take 30 mg by mouth daily. Hold for SBP<110     loratadine (CLARITIN) 10 mg tablet Take 10 mg by mouth daily as needed for allergies.     metoprolol tartrate (LOPRESSOR) 50 MG tablet Take 100 mg by mouth 2 (two) times a day.      mineral oil Drop Administer 3 drops into both ears every morning. Every day shift, apply on Q-tip and rub into ears.     nitroglycerin (NITROSTAT) 0.4 MG SL tablet Place 0.4 mg under the tongue every 5 (five) minutes as needed for chest pain. 1 tablet SL Q 5 minutes up to 3 doses. Call 911 if chest pain persists.     pimecrolimus (ELIDEL) 1 % cream Apply 1 application topically 2 (two) times a day as needed. Apply to both ears BID PRN for rash     pregabalin (LYRICA) 25 MG capsule Take 50 mg by mouth 3 (three) times a day.      ranitidine (ZANTAC) 150 MG tablet Take 150 mg by mouth once daily.      senna-docusate (SENNOSIDES-DOCUSATE SODIUM) 8.6-50 mg tablet Take 1 tablet by mouth daily as needed for constipation.     traMADol (ULTRAM) 50 mg tablet Take 50 mg by mouth every 8 (eight) hours as needed for pain.     triamcinolone (KENALOG) 0.1 % ointment Apply 1 application topically 2 (two) times a day.       PHYSICAL EXAM:  Gen.: Patient is alert, pleasant, female, sitting in a wheelchair, no distress.  Vitals: Blood pressure 130/72, Temp 97.8, Pulse 80-, Respirations 16, O2 sat 95% RA.  HEENT: Eyes show no conjunctival injection or icterus. Nares negative. Oropharynx moist, no pharyngeal redness or exudate.  Lungs: Clear. No wheezes.  Cardiovascular: Regular rate and rhythm, normal S1 and S2.  Abdomen: Obese, soft, non tender.  Back: No reproducible tenderness.   Extremities: She does have moderate lower extremity edema, is wearing Jobst stockings.  Musculoskeletal: Degenerative changes noted.      DIAGNOSTICS:  10/20/15: sodium 138, potassium 4.0, BUN 19, creatinine 0.86, GFR greater than 60.  9/12/16: Hgb A1C  7.5%.  3/29/17: Hgb A1C 7.3%.    Lab Results   Component Value Date    WBC 4.2 06/27/2017    HGB 9.0 (L) 06/27/2017    HCT 26.2 (L) 06/27/2017    MCV 99 06/27/2017     (L) 06/27/2017     Results for orders placed or performed in visit on 09/13/17   Basic Metabolic Panel   Result Value Ref Range    Sodium 137 136 - 145 mmol/L    Potassium 4.2 3.5 - 5.0 mmol/L    Chloride 107 98 - 107 mmol/L    CO2 23 22 - 31 mmol/L    Anion Gap, Calculation 7 5 - 18 mmol/L    Glucose 176 (H) 70 - 125 mg/dL    Calcium 9.2 8.5 - 10.5 mg/dL    BUN 28 (H) 8 - 22 mg/dL    Creatinine 1.20 (H) 0.60 - 1.10 mg/dL    GFR MDRD Af Amer 55 (L) >60 mL/min/1.73m2    GFR MDRD Non Af Amer 45 (L) >60 mL/min/1.73m2     Lab Results   Component Value Date    HGBA1C 7.9 (H) 11/07/2017       ASSESSMENT/PLAN:  1. IDDM. Last A1c at 7.9% which is up from prior. May need some adjustments.  2. HTN. Continue metoprolol, lisinopril and chlorthalidone. BPs satisfactory.   3. Neuropathy. Particularly hands and feet. On lyrica and gabapentin. Continue.  4. CAD. Hx MI and stent. Saw cardiology in May 2017.   5. Chronic kidney disease. Labs as noted.    Overall she appears stable and no new orders necessary.       Electronically signed by: Meagan Valdez MD

## 2021-06-15 NOTE — TELEPHONE ENCOUNTER
[facility] Nurse Triage Telephone Note      Provider: DANYELLE Wesley  Facility: [facility] Type: LTC    Caller: Rosalva  Call Back Number:  [phone]    Allergies: Nuts - unspecified and Unable to assess    Reason for call: TSH 1.05 (1.04) not on any Levothyroxine. Alk Phos 124.     Verbal Order/Direction given by Provider: NNO    Provider giving order: DANYELLE Wesley    Verbal order given to: Tere Reis RN

## 2021-06-15 NOTE — TELEPHONE ENCOUNTER
[facility] Nurse Triage Telephone Note      Provider: DANYELLE Wesley  Facility: [facility] Type: LTC    Caller: Rosalva  Call Back Number:  [phone]    Allergies: Nuts - unspecified and Unable to assess    Reason for call: NP wanted to know when las mamogram & colonoscopy where to see if pt is due for routine screening. Last Colonoscoopy was 1/24/20 & normal. Last Mamogram 1/7/20 normal.    Verbal Order/Direction given by Provider: She can wait 5 years for Colonoscopy & 1 year for mamogram.    Provider giving order: DANYELLE Wesley    Verbal order given to: Bernard Reis RN

## 2021-06-15 NOTE — PROGRESS NOTES
[facility]       Facility:   [facility] [[id number]]    Code Status: FULL CODE    CHIEF COMPLAINT/REASON FOR VISIT:  Chief Complaint   Patient presents with     [facility] Care Coordination - Home Visit-Annual Assessment       HPI:    Cristal Preciado is a 70 y.o.  (1950), who is being seen today for an annual comprehensive visit at [facility] [[id number]]   Cristal is a 70 y.o. female residing  in the long-term care facility [facility]. She has been a resident here since October 2011. She does have multiple complex co- morbidities. She is treated for hypertension and has insulin-dependent diabetes mellitus. She has a endocrinologist who follows her  blood sugars regularly.  She has chronic neuropathy, chronic kidney disease and is treated with Cymbalta for depression. She is on gabapentin and lyrica for neuropathy pain. She is on meds for depression.  Does require a lift for transfers. She has chronic weakness in her lower extremities.      Today she is seen for an Annual visit .  She is with a  hysterectomy October 2019.   She denies chest pain or shortness of breath.  She denies cough or congestion.  She is  non ambulatory, She is on  gabapentin and lyrica for neuropathy.. Her BS are being controlled by her endocrinologist and being sent in every couple of weeks.   She  denies cough or  Congestion. She is having no pain . No open areas on her skin. Her weights have ranged from 170-180 pounds over the last 3 months. She is up to date on her mammogram and colonoscopy. She will follow up at the eye doctor next week.  Labs reviewed from 2/22/21 and hgb 10.5 with elevated ferritin at 449. Will check TSh and LFTS.     LLERGIES: Nuts - unspecified and Unable to assess  PROBLEM LIST:  Patient Active Problem List   Diagnosis     Diabetic peripheral neuropathy associated with type 2 diabetes mellitus (H)     Type 2 diabetes mellitus with  hyperglycemia, with long-term current use of insulin (H)     Essential hypertension     CKD (chronic kidney disease)     CAD (coronary artery disease)     Bilateral lower extremity edema     Gout     Hyperlipidemia     Depression     Atopic dermatitis, chronic (right ear)     Obesity     Morbid obesity with BMI of 40.0-44.9, adult (H)     Chest pain     Acute chest pain     Hypomagnesemia     Impacted cerumen of right ear     PAST MEDICAL HISTORY:   Past Medical History:   Diagnosis Date     Acute cystitis with hematuria      Allergic rhinitis      CAD (coronary artery disease)      Carpal tunnel syndrome      Cataract      Cerebral vascular accident (H)      Chronic kidney disease      Debility      Depression      Diabetes mellitus, type II (H)      Diabetic nephropathy (H)      GERD (gastroesophageal reflux disease)      Glaucoma      Gout      History of pyelonephritis      HTN (hypertension)      Hyperlipidemia      IDDM (insulin dependent diabetes mellitus) (H)     Type 2     Lower extremity edema      Myocardial infarction (H)      PAST SURGICAL HISTORY:   Past Surgical History:   Procedure Laterality Date     CARDIAC CATHETERIZATION       CATARACT EXTRACTION W/ INTRAOCULAR LENS IMPLANT Bilateral       SECTION       CORONARY STENT PLACEMENT  2004    RCA     TOTAL ABDOMINAL HYSTERECTOMY W/ BILATERAL SALPINGOOPHORECTOMY  2019    Procedure: DaVinci Assisted Total Laparoscopic Hysterectomy, Removal Of Both Tubes And Ovaries; Surgeon: Linh Cardoso MD; Location:  OR       FAMILY HISTORY:   Family History   Problem Relation Age of Onset     Hypertension Mother      Stroke Mother      Cancer Father      Glaucoma Father      Arthritis Brother      Diabetes Sister      Glaucoma Sister      SOCIAL HISTORY:   Social History     Socioeconomic History     Marital status: Single     Spouse name: Not on file     Number of children: Not on file     Years of education: Not on file     Highest  education level: Not on file   Occupational History     Not on file   Social Needs     Financial resource strain: Not on file     Food insecurity     Worry: Not on file     Inability: Not on file     Transportation needs     Medical: Not on file     Non-medical: Not on file   Tobacco Use     Smoking status: Former Smoker     Smokeless tobacco: Never Used   Substance and Sexual Activity     Alcohol use: No     Drug use: No     Sexual activity: Not on file   Lifestyle     Physical activity     Days per week: Not on file     Minutes per session: Not on file     Stress: Not on file   Relationships     Social connections     Talks on phone: Not on file     Gets together: Not on file     Attends Rastafari service: Not on file     Active member of club or organization: Not on file     Attends meetings of clubs or organizations: Not on file     Relationship status: Not on file     Intimate partner violence     Fear of current or ex partner: Not on file     Emotionally abused: Not on file     Physically abused: Not on file     Forced sexual activity: Not on file   Other Topics Concern     Not on file   Social History Narrative    10/13/2015 - The patient lives at [facility] for [facility] years.     IMMUNIZATIONS:  Immunization History   Administered Date(s) Administered     Influenza, inj, historic,unspecified 10/26/2016, 10/12/2017, 10/15/2018, 10/11/2019     Above immunizations pulled from [facility]. Facility records also reconciled. Outstanding information sent to [facility] to update [facility].  Future immunizations are not needed at this point as all recommended immunizations are up to date.   MEDICATIONS:  Current Outpatient Medications   Medication Sig Dispense Refill     acetaminophen (TYLENOL) 500 MG tablet Take 1,000 mg by mouth 3 (three) times a day .             aspirin 81 mg chewable tablet Chew 81 mg daily.       atorvastatin (LIPITOR) 80 MG tablet Take 80 mg by mouth bedtime.         benzocaine-menthoL (CEPACOL) 15-3.6 mg Take 1 lozenge by mouth every 2 (two) hours as needed.       bisacodyL (DULCOLAX) 10 mg suppository Insert 10 mg into the rectum daily as needed.       carboxymethylcellulose (REFRESH PLUS) 0.5 % Dpet ophthalmic dropperette Administer 1 drop to both eyes 3 (three) times a day.       chlorthalidone (HYGROTEN) 25 MG tablet Take 25 mg by mouth daily.        dorzolamide-timolol (COSOPT) 22.3-6.8 mg/mL ophthalmic solution Administer 1 drop to both eyes 2 (two) times a day.        DULoxetine (CYMBALTA) 60 MG capsule Take 90 mg by mouth daily.        folic acid (FOLVITE) 1 MG tablet Take 1 mg by mouth daily.       gabapentin (NEURONTIN) 300 MG capsule Take 300 mg by mouth 3 (three) times a day.        insulin glargine U-300 conc (TOUJEO MAX U-300 SOLOSTAR) 300 unit/mL (3 mL) InPn Inject 72 Units under the skin daily. 2 injections of 45 units from pen        ketoconazole (NIZORAL) 2 % shampoo Apply 1 application topically 2 (two) times a week Apply to damp skin, lather, leave on 5 minutes, and rinse. Apply on Wednesdays and Saturdays..             latanoprost (XALATAN) 0.005 % ophthalmic solution Administer 1 drop to both eyes bedtime.        liraglutide (VICTOZA) 0.6 mg/0.1 mL (18 mg/3 mL) PnIj injection Inject 1.8 mg under the skin daily. Call [phone] Dr. Washington if she develops nausea lasting >3 days.       loratadine (CLARITIN) 10 mg tablet Take 10 mg by mouth daily as needed for allergies.       metFORMIN (GLUCOPHAGE-XR) 500 MG 24 hr tablet Take 1,000 mg by mouth daily.       metoprolol tartrate (LOPRESSOR) 100 MG tablet Take 100 mg by mouth 2 (two) times a day.       nitroglycerin (NITROSTAT) 0.4 MG SL tablet Place 0.4 mg under the tongue every 5 (five) minutes as needed for chest pain. 1 tablet SL Q 5 minutes up to 3 doses. Call [phone] if chest pain persists.       olopatadine 0.2 % Drop Apply 1 drop to eye daily as needed.       omeprazole (PRILOSEC) 20 MG capsule Take 20 mg  by mouth daily before breakfast.        pregabalin (LYRICA) 75 MG capsule Take 1 capsule (75 mg total) by mouth 3 (three) times a day. 90 capsule 0     senna-docusate (SENNOSIDES-DOCUSATE SODIUM) 8.6-50 mg tablet Take 2 tablets by mouth 2 (two) times a day as needed for constipation.              simethicone (MYLICON) 80 MG chewable tablet Chew 80 mg 4 (four) times a day. After meals and at HS       triamcinolone (KENALOG) 0.1 % ointment Apply 1 application topically daily. Apply to legs in the morning  And to right ear two times a day prn             No current facility-administered medications for this visit.        Case Management:  I have reviewed the facility/SNF care plan/MDS which was done 2/2/21, including the falls risk, nutrition and pain screening. I also reviewed the current immunizations, and preventive care.. up to date on mammoogram and colonoscopy.   Patient's desire to return to the community is present, but is not able due to care needs .    Advance Directive Discussion:    I reviewed the current advanced directives as reflected in [facility] and the facility chart. I did review the advance directives with the resident.     Team Discussion:  I communicated with the appropriate disciplines involved with the Plan of Care:   Nursing      Patient Goal:  Patient's goal is pain control and comfort.    Information reviewed:  Medications, vital signs, orders, and nursing notes.    ROS:  Constitutional: Positive for activity change. Negative for appetite change, chills, fatigue and fever.        Lift for transfers.    HENT: Negative for congestion and sore throat.    Respiratory: Negative for shortness of breath and wheezing.    Cardiovascular: negative for leg swelling . Negative for chest pain. (intermittent Right  upper chest discomfort. She was seen by cardiology and no known cause found. Resident reports relief with Tylenol)       Compression stockings   Gastrointestinal: Negative for abdominal  distention, abdominal pain, constipation, diarrhea and nausea.   Genitourinary: Negative for dysuria.   Musculoskeletal: Positive for arthralgias. Negative for myalgias.   Skin: Negative for color change, rash and wound.   Neurological: Positive for numbness. Negative for dizziness and weakness.        Severe neuropathy bilateral hands   Psychiatric/Behavioral: Negative for agitation, behavioral problems and sleep disturbance.   Exam:  /58   Pulse 76   Temp 98.3  F (36.8  C)   Resp 18   Wt 181 lb (82.1 kg)   SpO2 96%   BMI 32.06 kg/m     Abdominal: She exhibits distension.  Abdomen is soft  Skin:    healed old lap sites on abdomen     Constitutional: She is oriented to person, place, and time. She appears well-developed and well-nourished.   Pleasant woman in no acute distress.   HENT:   Head: Normocephalic.   Eyes: Conjunctivae are normal.   Neck: Normal range of motion.   Cardiovascular: Normal rate, regular rhythm and normal heart sounds.   No murmur heard.  Pulmonary/Chest: Breath sounds normal. No respiratory distress. She has no wheezes. She has no rales.   Abdominal: Soft. Bowel sounds are normal. She exhibits no distension. There is no tenderness.   Musculoskeletal: She exhibits edema.   Wearing Compression socks   Neurological: She is alert and oriented to person, place, and time.   Neuropathy bilateral hands.   Skin: Skin is warm.   Psychiatric: She has a normal mood and affect. Her behavior is normal  ASSESSMENT/PLAN    ICD-10-CM    1. Diabetic peripheral neuropathy associated with type 2 diabetes mellitus (H)  E11.42    2. Physical deconditioning  R53.81    3. Elevated ferritin level  R79.89        Elevated ferritin level check a TSH and LFTS's .      IDDM. FS are stable last A1C 6.5 on 11/27/20 Endocrinologist following , Continue current diabetic medications as above.       HTN. Continue  Lisinpril and metoprolol-stable     Neuropathy   On lyrica and gabapentin.     CAD. HX  MI and  stent.      S/p Hysterectomy in October 2019    Electronically signed by:  Shawna Mesa, CNP

## 2021-06-16 ENCOUNTER — RECORDS - HEALTHEAST (OUTPATIENT)
Dept: LAB | Facility: CLINIC | Age: 71
End: 2021-06-16

## 2021-06-16 NOTE — PROGRESS NOTES
[facility]    Facility:   [facility] [[id number]]   Code Status: FULL CODE       Chief Complaint   Patient presents with     Review Of Multiple Medical Conditions     LT 3/30/2021. Multiple debilities.        HPI:   Cristal is a 71 y.o. female seen for routine physician follow up in LT at [facility]. She has been a resident here since October 2011. She does have multiple complex co morbidities. She is treated for hypertension and has insulin-dependent diabetes mellitus. She sees an endocrinologist. She has chronic neuropathy, chronic kidney disease and is treated with Cymbalta for depression and chronic pain. She is on gabapentin and lyrica for neuropathy pain. She has chronic weakness in her lower extremities and is wheelchair bound. She has chronic urinary incontinence. Hospitalized in April 2018 for chest pain. It is noted she has coronary artery disease having had PCI with stent placement in 2009. Her chest pain was reproducible on exam. Negative 3 sets of troponin. EKG showed no significant ischemic changes. Pharmacological stress was negative for inducible ischemia so no intervention was required. She has periodic follow up with cardiology. She was admitted to the [facility] on 8/5/19 for planned hysterectomy. She had been experiencing brownish vaginal discharge, referred to gynecology with endometrial biopsy showing atypia. Her hospital course was uneventful, she underwent davinci assisted total laparoscopic hysterectomy with bilateral salping oophorectomy. She returned to [facility] on 8/6/19.      Today:  No interim concerns since my last visit. She continues on insulin, victoza and toujeo for diabetes, managed by endocrine. She has not been ill recently with cough, cold or congestion. No new concerns per nursing.  She is wheelchair bound, does not ambulate. She needs assist with cares due to neuropathy and difficulty using her hands. BPs  satisfactory, treated with metformin and chlorthalidone for HTN. She is on Cymbalta for mood and also chronic pain. She has no current open areas of her skin though noted areas of gluteal folds and coccyx are fragile.       Past Medical History:  Past Medical History:   Diagnosis Date     Acute cystitis with hematuria      Allergic rhinitis      CAD (coronary artery disease)      Carpal tunnel syndrome      Cataract      Cerebral vascular accident (H)      Chronic kidney disease      Debility      Depression      Diabetes mellitus, type II (H)      Diabetic nephropathy (H)      GERD (gastroesophageal reflux disease)      Glaucoma      Gout      History of pyelonephritis      HTN (hypertension)      Hyperlipidemia      IDDM (insulin dependent diabetes mellitus) (H)     Type 2     Lower extremity edema      Myocardial infarction (H)        Medications:  Current Outpatient Medications   Medication Instructions     acetaminophen (TYLENOL) 1,000 mg, Oral, 3 times daily     aspirin 81 mg, DAILY     atorvastatin (LIPITOR) 80 mg, Bedtime     benzocaine-menthoL (CEPACOL) 15-3.6 mg 1 lozenge, Oral, Every 2 hours PRN     bisacodyL (DULCOLAX) 10 mg, Rectal, Daily PRN     carboxymethylcellulose (REFRESH PLUS) 0.5 % Dpet ophthalmic dropperette 1 drop, Both Eyes, 3 times daily     chlorthalidone (HYGROTEN) 25 mg, Oral, DAILY     dorzolamide-timolol (COSOPT) 22.3-6.8 mg/mL ophthalmic solution 1 drop, 2 times daily     DULoxetine (CYMBALTA) 90 mg, Oral, DAILY     folic acid (FOLVITE) 1 mg, DAILY     gabapentin (NEURONTIN) 300 mg, Oral, 3 times daily     insulin glargine U-300 conc (TOUJEO MAX U-300 SOLOSTAR) 72 Units, Subcutaneous, DAILY, 2 injections of 45 units from pen     ketoconazole (NIZORAL) 2 % shampoo 1 application, Topical, 2 times weekly, Apply to damp skin, lather, leave on 5 minutes, and rinse. Apply on Wednesdays and Saturdays.     latanoprost (XALATAN) 0.005 % ophthalmic solution 1 drop, Bedtime     liraglutide  (VICTOZA) 1.8 mg, DAILY     loratadine (CLARITIN) 10 mg, Daily PRN     metFORMIN (GLUCOPHAGE-XR) 1,000 mg, Oral, QDaily     metoprolol tartrate (LOPRESSOR) 100 mg, Oral, 2 times daily     nitroglycerin (NITROSTAT) 0.4 mg, Every 5 min PRN     olopatadine 0.2 % Drop 1 drop, Ophthalmic, Daily PRN     omeprazole (PRILOSEC) 20 mg, Oral, every morning     pregabalin (LYRICA) 75 mg, Oral, 3 times daily     senna-docusate (SENNOSIDES-DOCUSATE SODIUM) 8.6-50 mg tablet 2 tablets, Oral, 2 times daily PRN     simethicone (MYLICON) 80 mg, Oral, 4 times daily, After meals and at HS      triamcinolone (KENALOG) 0.1 % ointment 1 application, Topical, DAILY, Apply to legs in the morning  And to right ear two times a day prn       Physical Exam:   Note: COVID-19 pandemic precautions in place. Physical exam performed with social distancing considerations.  General: Patient is alert female, no distress.   Vitals: /68, Temp 98.3, Pulse 70, RR 18, O2 sat 98% RA.  HEENT: Head is NCAT. Eyes show no injection or icterus. Nares negative. Oropharynx well hydrated.  CV: Non labored respirations.   : Deferred.  Extremities: Mild LE edema is noted.  Musculoskeletal: Deformities small joints hands.  Psych: Mood appears good.      Labs:  Lab Results   Component Value Date    HGBA1C 7.0 (H) 01/10/2019     Lab Results   Component Value Date    HGBA1C 6.9 (H) 03/18/2020     Lab Results   Component Value Date    HGBA1C 6.5 (H) 11/27/2020     Lab Results   Component Value Date    WBC 4.2 02/22/2021    HGB 10.5 (L) 02/22/2021    HCT 33.1 (L) 02/22/2021    MCV 97 02/22/2021     02/22/2021     Results for orders placed or performed in visit on 11/23/20   Basic Metabolic Panel   Result Value Ref Range    Sodium 137 136 - 145 mmol/L    Potassium 4.1 3.5 - 5.0 mmol/L    Chloride 103 98 - 107 mmol/L    CO2 23 22 - 31 mmol/L    Anion Gap, Calculation 11 5 - 18 mmol/L    Glucose 171 (H) 70 - 125 mg/dL    Calcium 10.0 8.5 - 10.5 mg/dL    BUN 17 8  - 22 mg/dL    Creatinine 0.91 0.60 - 1.10 mg/dL    GFR MDRD Af Amer >60 >60 mL/min/1.73m2    GFR MDRD Non Af Amer >60 >60 mL/min/1.73m2     Lab Results   Component Value Date    TSH 1.05 03/08/2021         Assessment/Plan:  1. IDDM. On insulin toujeo, victoza, and metformin. Continue to follow accuchecks. She sees endocrinology. Last A1c good at 6.5%.  2. HTN. She is treated with metoprolol and chlorthalidone. Overall BPs acceptable, no need for changes.   3. Neuropathy. Chronic pain controlled with gabapentin, lyrica and Cymbalta.  4. Hx of stroke. Wheelchair bound, non ambulatory. On aspirin, statin.  5. Glaucoma. Visual impairment at baseline.  6. Depression. Continue Cymbalta.  7. CKD. Labs as noted above.  8. Anemia. Last hgb at 10.5, stable.          Electronically signed by: Meagan Valdez MD

## 2021-06-16 NOTE — PROGRESS NOTES
[facility]    Facility:   [facility] [[id number]]   Code Status: FULL CODE      CHIEF COMPLAINT/REASON FOR VISIT:  Chief Complaint   Patient presents with     Review Of Multiple Medical Conditions     routine visit       HISTORY:      HPI: Cristal is a 67 y.o. female female being seen for routine visit in the long-term care facility [facility]. She has been a resident here since October 2011. She does have multiple complex co morbidities. She is treated for hypertension and has insulin-dependent diabetes mellitus. She has a endocrinologist. BS have been labile and she will follow up with Endocrinology on 3/16/18.  She has chronic neuropathy, chronic kidney disease and is treated with Cymbalta for depression. She is on gabapentin and lyrica for neuropathy pain. She is on meds for depression.  Does require a lift for transfers. She has chronic weakness in her lower extremities.     She was seen in her room this morning. She is pleasant and compliant with exam.  She denies chest pain or shortness of breath.  She denies cough or congestion.  She has open areas on her buttocks and using Mepilex for protection. She is also being followed by the wound nurse per staff who I have spoken with today. She tells me her wounds open and close. She reports scar tissue around them. They have not worsened from their baseline  She is  non ambulatory, has impaired mobility of both hands but does report relief with gabapentin and lyrica. Her last AIC was 7.9 on 11/7/17    Past Medical History:   Diagnosis Date     Allergic rhinitis      CAD (coronary artery disease)      Carpal tunnel syndrome      Cataract      Cerebral vascular accident      Chronic kidney disease      Debility      Depression      Diabetes mellitus, type II      Diabetic nephropathy      Diabetic neuropathy      GERD (gastroesophageal reflux disease)      Glaucoma      Gout      History of pyelonephritis       HTN (hypertension)      Hyperlipidemia      IDDM (insulin dependent diabetes mellitus)     Type 2     Lower extremity edema      Myocardial infarction              Family History   Problem Relation Age of Onset     Hypertension Mother      Stroke Mother      Cancer Father      Arthritis Brother      Social History     Social History     Marital status: Single     Spouse name: N/A     Number of children: N/A     Years of education: N/A     Social History Main Topics     Smoking status: Former Smoker     Smokeless tobacco: Not on file     Alcohol use No     Drug use: No     Sexual activity: Not on file     Other Topics Concern     Not on file     Social History Narrative    10/13/2015 - The patient lives at [facility] for 5 years.         Review of Systems   Constitutional: Positive for activity change. Negative for appetite change, chills, fatigue and fever.        Lift for transfers.    HENT: Negative for congestion and sore throat.    Respiratory: Negative for shortness of breath and wheezing.    Cardiovascular: Positive for leg swelling. Negative for chest pain.        Compression stockings   Gastrointestinal: Negative for abdominal distention, abdominal pain, constipation, diarrhea and nausea.   Genitourinary: Negative for dysuria.   Musculoskeletal: Positive for arthralgias. Negative for myalgias.   Skin: Negative for color change, rash and wound.        PU buttocks   Neurological: Positive for numbness. Negative for dizziness and weakness.        Severe neuropathy bilateral hands   Psychiatric/Behavioral: Negative for agitation, behavioral problems and sleep disturbance.       .  Vitals:    03/07/18 1525   BP: 140/74   Pulse: 80   Resp: 18   Temp: 97.8  F (36.6  C)   SpO2: 95%   Weight: (!) 238 lb 8 oz (108.2 kg)       Physical Exam   Constitutional: She is oriented to person, place, and time. She appears well-developed and well-nourished.   Pleasant woman in no acute distress.   HENT:   Head:  Normocephalic.   Eyes: Conjunctivae are normal.   Neck: Normal range of motion.   Cardiovascular: Normal rate, regular rhythm and normal heart sounds.    No murmur heard.  Pulmonary/Chest: Breath sounds normal. No respiratory distress. She has no wheezes. She has no rales.   Abdominal: Soft. Bowel sounds are normal. She exhibits no distension. There is no tenderness.   Musculoskeletal: She exhibits edema.   Wearing Compression socks   Neurological: She is alert and oriented to person, place, and time.   Neuropathy bilateral hands.   Skin: Skin is warm.   Stage 2 pressure ulcers left and right buttock, followed by facility wound nurse.    Psychiatric: She has a normal mood and affect. Her behavior is normal.         LABS:   No results found for this or any previous visit (from the past 240 hour(s)).   11/7/17 AIC 7.9  9/13/17  BMP  BUN 28  Creatinine 1.20  GFR 55    Current Outpatient Prescriptions   Medication Sig     acetaminophen (TYLENOL) 500 MG tablet Take 1,000 mg by mouth daily. Give at bedtime; also has additional pm orders. For hand pain. Don't exceed 3000mg/24hrs     acetaminophen (TYLENOL) 500 MG tablet Take 1,000 mg by mouth every 6 (six) hours as needed for pain. For pain 6-10. Don't exceed 3000mg/24hrs     ammonium lactate (AMLACTIN) 12 % cream Apply 1 application topically as needed for dry skin. Apply to feet     aspirin 81 mg chewable tablet Chew 81 mg daily.     atorvastatin (LIPITOR) 80 MG tablet Take 80 mg by mouth bedtime.      benzocaine-menthol (CEPACOL) 15-3.6 mg Take 1 lozenge by mouth every 2 (two) hours as needed.     calcium carbonate-vit D3-min 600 mg calcium- 400 unit Tab Take 1 tablet by mouth 2 (two) times a day.      chlorthalidone (HYGROTEN) 25 MG tablet Take 25 mg by mouth daily.      dorzolamide-timolol (COSOPT) 22.3-6.8 mg/mL ophthalmic solution Administer 1 drop to both eyes 2 (two) times a day.      DULoxetine (CYMBALTA) 30 MG capsule Take 30 mg by mouth daily.     ferrous  sulfate 325 (65 FE) MG tablet Take 1 tablet by mouth daily with breakfast.     folic acid (FOLVITE) 1 MG tablet Take 1 mg by mouth daily.     gabapentin (NEURONTIN) 300 MG capsule Take 1,000 mg by mouth 3 (three) times a day.      insulin aspart (NOVOLOG) 100 unit/mL injection Inject 94 Units under the skin Daily before breakfast.      insulin aspart (NOVOLOG) 100 unit/mL injection Inject 90 Units under the skin daily before lunch.      insulin aspart (NOVOLOG) 100 unit/mL injection Inject 74 Units under the skin daily before supper.      insulin glargine (LANTUS) 100 unit/mL injection Inject 100 Units under the skin 2 (two) times a day.      ketoconazole (NIZORAL) 2 % shampoo Apply 1 application topically 2 (two) times a week. Apply to damp skin, lather, leave on 5 minutes, and rinse     latanoprost (XALATAN) 0.005 % ophthalmic solution Administer 1 drop to both eyes bedtime.      liraglutide (VICTOZA) 0.6 mg/0.1 mL (18 mg/3 mL) PnIj injection Inject 1.8 mg under the skin daily. Call [phone] Dr. Washington if she develops nausea lasting >3 days.     lisinopril (PRINIVIL,ZESTRIL) 10 MG tablet Take 30 mg by mouth daily. Hold for SBP<110     loratadine (CLARITIN) 10 mg tablet Take 10 mg by mouth daily as needed for allergies.     metoprolol tartrate (LOPRESSOR) 50 MG tablet Take 100 mg by mouth 2 (two) times a day.      mineral oil Drop Administer 3 drops into both ears every morning. Every day shift, apply on Q-tip and rub into ears.     nitroglycerin (NITROSTAT) 0.4 MG SL tablet Place 0.4 mg under the tongue every 5 (five) minutes as needed for chest pain. 1 tablet SL Q 5 minutes up to 3 doses. Call [phone] if chest pain persists.     pimecrolimus (ELIDEL) 1 % cream Apply 1 application topically 2 (two) times a day as needed. Apply to both ears BID PRN for rash     pregabalin (LYRICA) 25 MG capsule Take 50 mg by mouth 3 (three) times a day.      ranitidine (ZANTAC) 150 MG tablet Take 150 mg by mouth once daily.       senna-docusate (SENNOSIDES-DOCUSATE SODIUM) 8.6-50 mg tablet Take 1 tablet by mouth daily as needed for constipation.     traMADol (ULTRAM) 50 mg tablet Take 50 mg by mouth every 8 (eight) hours as needed for pain.     triamcinolone (KENALOG) 0.1 % ointment Apply 1 application topically daily. Apply to legs in the morning  And to right ear prn BID       ASSESSMENT:      ICD-10-CM    1. Hyperlipidemia E78.5    2. Essential hypertension I10    3. Type 2 diabetes mellitus E11.9    4. Diabetic peripheral neuropathy associated with type 2 diabetes mellitus E11.42    5. Bilateral lower extremity edema R60.0    6. Atopic dermatitis, unspecified type L20.9        PLAN:    IDDM. FS are labile, she is on high doses ogf meal coverage which has been adjusted, she  Continues on  Lantus 100mg bid  and victoza. A1C  11/7/17 7.9  SHe will follow up with endocrinology on 3/16/18  HTN. BPs stable . Cont continue lisinpril, metoprolol  Neuropathy.mainly  hands with reports of feet also . On lyrica and gabapentin.  CAD. Hx MI and stent. Saw cardiology in May.         Electronically signed by: Shawna Mesa CNP

## 2021-06-17 ENCOUNTER — COMMUNICATION - HEALTHEAST (OUTPATIENT)
Dept: GERIATRICS | Facility: CLINIC | Age: 71
End: 2021-06-17

## 2021-06-17 LAB
ANION GAP SERPL CALCULATED.3IONS-SCNC: 11 MMOL/L (ref 5–18)
BUN SERPL-MCNC: 29 MG/DL (ref 8–22)
CALCIUM SERPL-MCNC: 9.4 MG/DL (ref 8.5–10.5)
CHLORIDE BLD-SCNC: 99 MMOL/L (ref 98–107)
CO2 SERPL-SCNC: 25 MMOL/L (ref 22–31)
CREAT SERPL-MCNC: 1.14 MG/DL (ref 0.6–1.1)
GFR SERPL CREATININE-BSD FRML MDRD: 47 ML/MIN/1.73M2
GLUCOSE BLD-MCNC: 105 MG/DL (ref 70–125)
HBA1C MFR BLD: 8.1 %
POTASSIUM BLD-SCNC: 3.3 MMOL/L (ref 3.5–5)
SODIUM SERPL-SCNC: 135 MMOL/L (ref 136–145)
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
## 2021-06-17 NOTE — PROGRESS NOTES
[facility]    Facility:   [facility] [[id number]]   Code Status: FULL CODE      CHIEF COMPLAINT/REASON FOR VISIT:  Chief Complaint   Patient presents with     P Care Coordination - Regulatory       HISTORY:      HPI: Cristal is a 71 y.o. female residing  in the long-term care facility [facility]. She has been a resident here since October 2011. She does have multiple complex co- morbidities. She is treated for hypertension and has insulin-dependent diabetes mellitus. She has a endocrinologist who follows her  blood sugars regularly.  She has chronic neuropathy, chronic kidney disease and is treated with Cymbalta for depression. She is on gabapentin and lyrica for neuropathy pain. She is on meds for depression.  Does require a lift for transfers. She has chronic weakness in her lower extremities.      Today she is seen for a routine  Regulatory visit .  She is with a  hysterectomy October 2019.   She denies chest pain or shortness of breath.  She denies cough or congestion.  She is  non ambulatory, She is on  gabapentin and lyrica for neuropathy.. Her BS are being controlled by her endocrinologist and being sent in every couple of weeks.   She denies cough or congestion.  She is having intermittent numbness and pain in her hands.  No open areas on her skin.  Her weights were reviewed and she is down 2.5 pounds over the last month.    Past Medical History:   Diagnosis Date     Acute cystitis with hematuria      Allergic rhinitis      CAD (coronary artery disease)      Carpal tunnel syndrome      Cataract      Cerebral vascular accident (H)      Chronic kidney disease      Debility      Depression      Diabetes mellitus, type II (H)      Diabetic nephropathy (H)      GERD (gastroesophageal reflux disease)      Glaucoma      Gout      History of pyelonephritis      HTN (hypertension)      Hyperlipidemia      IDDM (insulin dependent diabetes mellitus) (H)      Type 2     Lower extremity edema      Myocardial infarction (H)              Family History   Problem Relation Age of Onset     Hypertension Mother      Stroke Mother      Cancer Father      Glaucoma Father      Arthritis Brother      Diabetes Sister      Glaucoma Sister      Social History     Socioeconomic History     Marital status: Single     Spouse name: Not on file     Number of children: Not on file     Years of education: Not on file     Highest education level: Not on file   Occupational History     Not on file   Social Needs     Financial resource strain: Not on file     Food insecurity     Worry: Not on file     Inability: Not on file     Transportation needs     Medical: Not on file     Non-medical: Not on file   Tobacco Use     Smoking status: Former Smoker     Smokeless tobacco: Never Used   Substance and Sexual Activity     Alcohol use: No     Drug use: No     Sexual activity: Not on file   Lifestyle     Physical activity     Days per week: Not on file     Minutes per session: Not on file     Stress: Not on file   Relationships     Social connections     Talks on phone: Not on file     Gets together: Not on file     Attends Congregation service: Not on file     Active member of club or organization: Not on file     Attends meetings of clubs or organizations: Not on file     Relationship status: Not on file     Intimate partner violence     Fear of current or ex partner: Not on file     Emotionally abused: Not on file     Physically abused: Not on file     Forced sexual activity: Not on file   Other Topics Concern     Not on file   Social History Narrative    10/13/2015 - The patient lives at [facility] for 5 years.         Review of Systems  Constitutional: Positive for activity change. Negative for appetite change, chills, fatigue and fever.        Lift for transfers.    HENT: Negative for congestion and sore throat.    Respiratory: Negative for shortness of breath and wheezing.    Cardiovascular:  negative for leg swelling . Negative for chest pain. (intermittent Right  upper chest discomfort. She was seen by cardiology and no known cause found.  Denies today     compression stockings   Gastrointestinal: Negative for abdominal distention, abdominal pain, constipation, diarrhea and nausea.   Genitourinary: Negative for dysuria.   Musculoskeletal: Positive for arthralgias. Negative for myalgias.   Skin: Negative for color change, rash and wound.   Neurological: Positive for numbness. Negative for dizziness and weakness.        Severe neuropathy bilateral hands   Psychiatric/Behavioral: Negative for agitation, behavioral problems and sleep disturbance.   Vitals:    05/05/21 1101   BP: 128/60   Pulse: 76   Resp: 18   Temp: 98.3  F (36.8  C)   SpO2: 98%   Weight: 177 lb 8 oz (80.5 kg)       Physical Exam    Abdominal: She exhibits distension.  Abdomen is soft  Skin:    healed old lap sites on abdomen     Constitutional: She is oriented to person, place, and time. She appears well-developed and well-nourished.   Pleasant woman in no acute distress.   HENT:   Head: Normocephalic.   Eyes: Conjunctivae are normal.   Neck: Normal range of motion.   Cardiovascular: Normal rate, regular rhythm and normal heart sounds.   No murmur heard.  Pulmonary/Chest: Breath sounds normal. No respiratory distress. She has no wheezes. She has no rales.   Abdominal: Soft. Bowel sounds are normal. She exhibits no distension. There is no tenderness.   Musculoskeletal: She exhibits edema.   Wearing Compression socks   Neurological: She is alert and oriented to person, place, and time.   Neuropathy bilateral hands.   Skin: Skin is warm.   Psychiatric: She has a normal mood and affect. Her behavior is normal  LABS:   No results found for this or any previous visit (from the past 240 hour(s)).  Case Management:  I have reviewed the facility/SNF care plan/MDS which was done 4/26/2021 including the falls risk, nutrition and pain screening. I  also reviewed the current immunizations, and preventive care..She is up to date on tests. Patient's desire to return to the community is not present.    Information reviewed:  Medications, vital signs, orders, and nursing notes.    ASSESSMENT:      ICD-10-CM    1. Type 2 diabetes mellitus with hyperglycemia, with long-term current use of insulin (H)  E11.65     Z79.4    2. Diabetic peripheral neuropathy associated with type 2 diabetes mellitus (H)  E11.42        PLAN:       IDDM. FS are stable last A1C 6.9 on 3/18/20 Endocrinologist following , Continue current diabetic medications as above.       HTN. Continue  Lisinpril and metoprolol-stable     Neuropathy   On lyrica and gabapentin.     CAD. HX  MI and stent.      S/p Hysterectomy in October 2019      Electronically signed by: Shawna Mesa CNP

## 2021-06-18 ENCOUNTER — COMMUNICATION - HEALTHEAST (OUTPATIENT)
Dept: GERIATRICS | Facility: CLINIC | Age: 71
End: 2021-06-18

## 2021-06-18 ENCOUNTER — RECORDS - HEALTHEAST (OUTPATIENT)
Dept: LAB | Facility: CLINIC | Age: 71
End: 2021-06-18

## 2021-06-18 LAB — POTASSIUM BLD-SCNC: 3.7 MMOL/L (ref 3.5–5)

## 2021-06-18 NOTE — LETTER
Letter by Shawna Mesa CNP at      Author: Shawna Mesa CNP Service: -- Author Type: --    Filed:  Encounter Date: 1/9/2019 Status: (Other)         Patient: Cristal Preciado   MR Number: [id number]   YOB: 1950   Date of Visit: 1/9/2019     [facility]    Facility:   [facility] [[id number]]   Code Status: FULL CODE      CHIEF COMPLAINT/REASON FOR VISIT:  Chief Complaint   Patient presents with   ? Review Of Multiple Medical Conditions       HISTORY:      HPI: Cristal is a 68 y.o. female  being seen for routine visit in the long-term care facility [facility]. She has been a resident here since October 2011. She does have multiple complex co morbidities. She is treated for hypertension and has insulin-dependent diabetes mellitus. She has a endocrinologist who is following her blood sugars. Nursing is sending weekly checks to her endocrinologist.    She has chronic neuropathy, chronic kidney disease and is treated with Cymbalta for depression. She is on gabapentin and lyrica for neuropathy pain. She is on meds for depression.  Does require a lift for transfers. She has chronic weakness in her lower extremities.     Today she isseen in her room.She is pleasant and compliant with exam.  She denies chest pain or shortness of breath.  She denies cough or congestion. Her BS have been labile and endocrinology is following her.  She is  non ambulatory, has impaired mobility of both hands but does report relief with gabapentin and lyrica. Her only report today is she mentions a sore throat and difficulty swallowing her food for the last month. Staff has not noticed any eating concerns. I will have speech evaluate her She will be seeing a dentist this week due to a missing tooth on he bottom denture and needing some shaving down on her upper dentures. Her weights were reviewed and she is down 7 pounds over the last month. Last A1C 8.3- 9/2018.    Past  Medical History:   Diagnosis Date   ? Allergic rhinitis    ? CAD (coronary artery disease)    ? Carpal tunnel syndrome    ? Cataract    ? Cerebral vascular accident (H)    ? Chronic kidney disease    ? Debility    ? Depression    ? Diabetes mellitus, type II (H)    ? Diabetic nephropathy (H)    ? Diabetic neuropathy (H)    ? GERD (gastroesophageal reflux disease)    ? Glaucoma    ? Gout    ? History of pyelonephritis    ? HTN (hypertension)    ? Hyperlipidemia    ? IDDM (insulin dependent diabetes mellitus) (H)     Type 2   ? Lower extremity edema    ? Myocardial infarction              Family History   Problem Relation Age of Onset   ? Hypertension Mother    ? Stroke Mother    ? Cancer Father    ? Arthritis Brother      Social History     Socioeconomic History   ? Marital status: Single     Spouse name: Not on file   ? Number of children: Not on file   ? Years of education: Not on file   ? Highest education level: Not on file   Social Needs   ? Financial resource strain: Not on file   ? Food insecurity - worry: Not on file   ? Food insecurity - inability: Not on file   ? Transportation needs - medical: Not on file   ? Transportation needs - non-medical: Not on file   Occupational History   ? Not on file   Tobacco Use   ? Smoking status: Former Smoker   Substance and Sexual Activity   ? Alcohol use: No   ? Drug use: No   ? Sexual activity: Not on file   Other Topics Concern   ? Not on file   Social History Narrative    10/13/2015 - The patient lives at [facility] for 5 years.         Review of Systems   Constitutional: Positive for activity change. Negative for appetite change, chills, fatigue and fever.        Lift for transfers.    HENT: Negative for congestion and sore throat.    Respiratory: Negative for shortness of breath and wheezing.    Cardiovascular: Positive for leg swelling. Negative for chest pain.        Compression stockings   Gastrointestinal: Negative for abdominal distention, abdominal pain,  constipation, diarrhea and nausea.   Genitourinary: Negative for dysuria.   Musculoskeletal: Positive for arthralgias. Negative for myalgias.   Skin: Negative for color change, rash and wound.   Neurological: Positive for numbness. Negative for dizziness and weakness.        Severe neuropathy bilateral hands   Psychiatric/Behavioral: Negative for agitation, behavioral problems and sleep disturbance.       .  Vitals:    01/09/19 0831   BP: 147/72   Pulse: 88   Resp: 18   Temp: 97.6  F (36.4  C)   SpO2: 96%   Weight: (!) 233 lb 8 oz (105.9 kg)       Physical Exam   Constitutional: She is oriented to person, place, and time. She appears well-developed and well-nourished.   Pleasant woman in no acute distress.   HENT:   Head: Normocephalic.   Eyes: Conjunctivae are normal.   Neck: Normal range of motion.   Cardiovascular: Normal rate, regular rhythm and normal heart sounds.   No murmur heard.  Pulmonary/Chest: Breath sounds normal. No respiratory distress. She has no wheezes. She has no rales.   Abdominal: Soft. Bowel sounds are normal. She exhibits no distension. There is no tenderness.   Musculoskeletal: She exhibits edema.   Wearing Compression socks   Neurological: She is alert and oriented to person, place, and time.   Neuropathy bilateral hands.   Skin: Skin is warm.   Psychiatric: She has a normal mood and affect. Her behavior is normal.         LABS:    No results found for this or any previous visit (from the past 240 hour(s)).Aic 9/7/18 8.3  AiC 4/24/18 was 7.7 which is down from 7.9  9/13/17  BMP  BUN 28  Creatinine 1.20  GFR 55    Current Outpatient Medications   Medication Sig   ? acetaminophen (TYLENOL) 500 MG tablet Take 1,000 mg by mouth 3 (three) times a day .         ? aspirin 81 mg chewable tablet Chew 81 mg daily.   ? atorvastatin (LIPITOR) 80 MG tablet Take 80 mg by mouth bedtime.    ? calcium carbonate-vit D3-min 600 mg calcium- 400 unit Tab Take 1 tablet by mouth 2 (two) times a day.    ?  chlorthalidone (HYGROTEN) 25 MG tablet Take 25 mg by mouth daily.    ? dorzolamide-timolol (COSOPT) 22.3-6.8 mg/mL ophthalmic solution Administer 1 drop to both eyes 2 (two) times a day.    ? DULoxetine (CYMBALTA) 60 MG capsule Take 60 mg by mouth daily.   ? ferrous sulfate 325 (65 FE) MG tablet Take 1 tablet by mouth daily with breakfast.   ? folic acid (FOLVITE) 1 MG tablet Take 1 mg by mouth daily.   ? gabapentin (NEURONTIN) 300 MG capsule Take 900 mg by mouth 3 (three) times a day.   ? insulin regular hum U-500 conc, HumuLIN R, (HUMULIN R CONC) 500 unit/mL CONCENTRATED injection Inject 60 Units under the skin 3 (three) times a day Call if readings <70 or >350 .   ? ketoconazole (NIZORAL) 2 % shampoo Apply 1 application topically 2 (two) times a week Apply to damp skin, lather, leave on 5 minutes, and rinse. Apply on Wednesdays and Saturdays..         ? latanoprost (XALATAN) 0.005 % ophthalmic solution Administer 1 drop to both eyes bedtime.    ? liraglutide (VICTOZA) 0.6 mg/0.1 mL (18 mg/3 mL) PnIj injection Inject 1.8 mg under the skin daily. Call [phone] Dr. Washington if she develops nausea lasting >3 days.   ? lisinopril (PRINIVIL,ZESTRIL) 40 MG tablet Take 40 mg by mouth daily. Hold for SBP <110   ? loratadine (CLARITIN) 10 mg tablet Take 10 mg by mouth daily as needed for allergies.   ? metFORMIN (GLUCOPHAGE-XR) 500 MG 24 hr tablet Take 1,000 mg by mouth daily with breakfast.   ? metoprolol tartrate (LOPRESSOR) 100 MG tablet Take 100 mg by mouth 2 (two) times a day.   ? mineral oil Drop Administer 3 drops into both ears daily as needed Every day shift, apply on Q-tip and rub into ears. .         ? nitroglycerin (NITROSTAT) 0.4 MG SL tablet Place 0.4 mg under the tongue every 5 (five) minutes as needed for chest pain. 1 tablet SL Q 5 minutes up to 3 doses. Call [phone] if chest pain persists.   ? olopatadine 0.2 % Drop Apply 1 drop to eye daily as needed.   ? pregabalin (LYRICA) 75 MG capsule Take 75 mg by  mouth 3 (three) times a day.   ? ranitidine (ZANTAC) 150 MG tablet Take 150 mg by mouth once daily.    ? senna-docusate (SENNOSIDES-DOCUSATE SODIUM) 8.6-50 mg tablet Take 1 tablet by mouth daily as needed for constipation.   ? traMADol (ULTRAM) 50 mg tablet Take 1 tablet (50 mg total) by mouth 3 (three) times a day as needed for pain. For pain 6 or higher   ? triamcinolone (KENALOG) 0.1 % ointment Apply 1 application topically daily. Apply to legs in the morning  And to right ear prn BID       ASSESSMENT:      ICD-10-CM    1. IDDM (insulin dependent diabetes mellitus) (H) E11.9     Z79.4    2. Essential hypertension I10    3. Other chronic pain G89.29    4. Neuropathy (H) G62.9        PLAN:    IDDM. FS are labile, she is on high doses of  meal coverage. Endocrinologist following , A1C ordered.   HTN. Continue  Lisinpril and metoprolol  Neuropathy.mainly  hands with reports of feet also . On lyrica and gabapentin.  CAD. HX  MI and stent.   Difficulty swallowing - speech to evaluate.        Electronically signed by: Shawna Mesa, CNP

## 2021-06-18 NOTE — LETTER
Letter by Shawna Mesa CNP at      Author: Shawna Mesa CNP Service: -- Author Type: --    Filed:  Encounter Date: 2/22/2019 Status: (Other)         Patient: Cristal Preciado   MR Number: [id number]   YOB: 1950   Date of Visit: 2/22/2019     [facility]    Facility:   [facility] [[id number]]   Code Status: FULL CODE      CHIEF COMPLAINT/REASON FOR VISIT:  Chief Complaint   Patient presents with   ? Problem Visit     heel blister/pressure ulcer       HISTORY:      HPI: Cristal is a 68 y.o. female  being seen for routine visit in the long-term care facility [facility]. She has been a resident here since October 2011. She does have multiple complex co morbidities. She is treated for hypertension and has insulin-dependent diabetes mellitus. She has a endocrinologist who is following her blood sugars. Nursing is sending weekly checks to her endocrinologist.    She has chronic neuropathy, chronic kidney disease and is treated with Cymbalta for depression. She is on gabapentin and lyrica for neuropathy pain. She is on meds for depression.  Does require a lift for transfers. She has chronic weakness in her lower extremities.     Today she isseen in her room to evaluate a blister verses pressure ulcer medial right heel. She is noted to have a dark spot medial heel. It it difficult to determine whether it was a blood blister or an unstageable pressure ulcer. She has had it for months and it is intact. She has no pain and the site is not boggy or soft.   She denies chest pain or shortness of breath.  She denies cough or congestion.   She is  non ambulatory, has impaired mobility of both hands but does report relief with gabapentin and lyrica.       Past Medical History:   Diagnosis Date   ? Allergic rhinitis    ? CAD (coronary artery disease)    ? Carpal tunnel syndrome    ? Cataract    ? Cerebral vascular accident (H)    ? Chronic kidney disease    ?  Debility    ? Depression    ? Diabetes mellitus, type II (H)    ? Diabetic nephropathy (H)    ? Diabetic neuropathy (H)    ? GERD (gastroesophageal reflux disease)    ? Glaucoma    ? Gout    ? History of pyelonephritis    ? HTN (hypertension)    ? Hyperlipidemia    ? IDDM (insulin dependent diabetes mellitus) (H)     Type 2   ? Lower extremity edema    ? Myocardial infarction              Family History   Problem Relation Age of Onset   ? Hypertension Mother    ? Stroke Mother    ? Cancer Father    ? Arthritis Brother      Social History     Socioeconomic History   ? Marital status: Single     Spouse name: Not on file   ? Number of children: Not on file   ? Years of education: Not on file   ? Highest education level: Not on file   Occupational History   ? Not on file   Social Needs   ? Financial resource strain: Not on file   ? Food insecurity:     Worry: Not on file     Inability: Not on file   ? Transportation needs:     Medical: Not on file     Non-medical: Not on file   Tobacco Use   ? Smoking status: Former Smoker   Substance and Sexual Activity   ? Alcohol use: No   ? Drug use: No   ? Sexual activity: Not on file   Lifestyle   ? Physical activity:     Days per week: Not on file     Minutes per session: Not on file   ? Stress: Not on file   Relationships   ? Social connections:     Talks on phone: Not on file     Gets together: Not on file     Attends Gnosticist service: Not on file     Active member of club or organization: Not on file     Attends meetings of clubs or organizations: Not on file     Relationship status: Not on file   ? Intimate partner violence:     Fear of current or ex partner: Not on file     Emotionally abused: Not on file     Physically abused: Not on file     Forced sexual activity: Not on file   Other Topics Concern   ? Not on file   Social History Narrative    10/13/2015 - The patient lives at [facility] for 5 years.         Review of Systems   Constitutional: Positive for activity  change. Negative for appetite change, chills, fatigue and fever.        Lift for transfers.    HENT: Negative for congestion and sore throat.    Respiratory: Negative for shortness of breath and wheezing.    Cardiovascular: Positive for leg swelling. Negative for chest pain.        Compression stockings   Gastrointestinal: Negative for abdominal distention, abdominal pain, constipation, diarrhea and nausea.   Genitourinary: Negative for dysuria.   Musculoskeletal: Positive for arthralgias. Negative for myalgias.   Skin: Negative for color change, rash and wound.   Neurological: Positive for numbness. Negative for dizziness and weakness.        Severe neuropathy bilateral hands   Psychiatric/Behavioral: Negative for agitation, behavioral problems and sleep disturbance.       .  Vitals:    02/22/19 2133   BP: 115/44   Pulse: 78   Resp: 20   Temp: 98  F (36.7  C)   SpO2: 94%   Weight: (!) 229 lb (103.9 kg)       Physical Exam   Constitutional: She is oriented to person, place, and time. She appears well-developed and well-nourished.   Pleasant woman in no acute distress.   HENT:   Head: Normocephalic.   Eyes: Conjunctivae are normal.   Neck: Normal range of motion.   Cardiovascular: Normal rate, regular rhythm and normal heart sounds.   No murmur heard.  Pulmonary/Chest: Breath sounds normal. No respiratory distress. She has no wheezes. She has no rales.   Abdominal: Soft. Bowel sounds are normal. She exhibits no distension. There is no tenderness.   Musculoskeletal: She exhibits edema.   Wearing Compression socks   Neurological: She is alert and oriented to person, place, and time.   Neuropathy bilateral hands.   Skin: Skin is warm.   Small purple area medial right heel    Psychiatric: She has a normal mood and affect. Her behavior is normal.         LABS:    No results found for this or any previous visit (from the past 240 hour(s)).Aic 9/7/18 8.3  AiC 4/24/18 was 7.7 which is down from 7.9  9/13/17  BMP  BUN  28  Creatinine 1.20  GFR 55    Current Outpatient Medications   Medication Sig   ? acetaminophen (TYLENOL) 500 MG tablet Take 1,000 mg by mouth 3 (three) times a day .         ? aspirin 81 mg chewable tablet Chew 81 mg daily.   ? atorvastatin (LIPITOR) 80 MG tablet Take 80 mg by mouth bedtime.    ? calcium carbonate-vit D3-min 600 mg calcium- 400 unit Tab Take 1 tablet by mouth 2 (two) times a day.    ? chlorthalidone (HYGROTEN) 25 MG tablet Take 25 mg by mouth daily.    ? dorzolamide-timolol (COSOPT) 22.3-6.8 mg/mL ophthalmic solution Administer 1 drop to both eyes 2 (two) times a day.    ? DULoxetine (CYMBALTA) 60 MG capsule Take 60 mg by mouth daily.   ? ferrous sulfate 325 (65 FE) MG tablet Take 1 tablet by mouth daily with breakfast.   ? folic acid (FOLVITE) 1 MG tablet Take 1 mg by mouth daily.   ? gabapentin (NEURONTIN) 300 MG capsule Take 900 mg by mouth 3 (three) times a day.   ? insulin regular hum U-500 conc, HumuLIN R, (HUMULIN R CONC) 500 unit/mL CONCENTRATED injection Inject 60 Units under the skin 3 (three) times a day Call if readings <70 or >350 .   ? ketoconazole (NIZORAL) 2 % shampoo Apply 1 application topically 2 (two) times a week Apply to damp skin, lather, leave on 5 minutes, and rinse. Apply on Wednesdays and Saturdays..         ? latanoprost (XALATAN) 0.005 % ophthalmic solution Administer 1 drop to both eyes bedtime.    ? liraglutide (VICTOZA) 0.6 mg/0.1 mL (18 mg/3 mL) PnIj injection Inject 1.8 mg under the skin daily. Call [phone] Dr. Washington if she develops nausea lasting >3 days.   ? lisinopril (PRINIVIL,ZESTRIL) 40 MG tablet Take 40 mg by mouth daily. Hold for SBP <110   ? loratadine (CLARITIN) 10 mg tablet Take 10 mg by mouth daily as needed for allergies.   ? metFORMIN (GLUCOPHAGE-XR) 500 MG 24 hr tablet Take 1,000 mg by mouth daily with breakfast.   ? metoprolol tartrate (LOPRESSOR) 100 MG tablet Take 100 mg by mouth 2 (two) times a day.   ? mineral oil Drop Administer 3 drops  into both ears daily as needed Every day shift, apply on Q-tip and rub into ears. .         ? nitroglycerin (NITROSTAT) 0.4 MG SL tablet Place 0.4 mg under the tongue every 5 (five) minutes as needed for chest pain. 1 tablet SL Q 5 minutes up to 3 doses. Call 911 if chest pain persists.   ? olopatadine 0.2 % Drop Apply 1 drop to eye daily as needed.   ? pregabalin (LYRICA) 75 MG capsule Take 75 mg by mouth 3 (three) times a day.   ? ranitidine (ZANTAC) 150 MG tablet Take 150 mg by mouth once daily.    ? senna-docusate (SENNOSIDES-DOCUSATE SODIUM) 8.6-50 mg tablet Take 1 tablet by mouth daily as needed for constipation.   ? traMADol (ULTRAM) 50 mg tablet Take 1 tablet (50 mg total) by mouth 3 (three) times a day as needed for pain. For pain 6 or higher   ? triamcinolone (KENALOG) 0.1 % ointment Apply 1 application topically daily. Apply to legs in the morning  And to right ear prn BID       ASSESSMENT:      ICD-10-CM    1. Blister of right heel, initial encounter S90.821A        PLAN:   Small darkened area medial right heel, diffu=icult to determine if blood blister/unstageable pressure sore, float heels while in bed, skin prep daily and monitor for increased changes in the heel.   IDDM. FS are labile, she is on high doses of  meal coverage. Endocrinologist following ,    HTN. Continue  Lisinpril and metoprolol  Neuropathy.mainly  hands with reports of feet also . On lyrica and gabapentin.  CAD. HX  MI and stent.           Electronically signed by: Shawna Mesa, CNP

## 2021-06-18 NOTE — PROGRESS NOTES
[facility]    Facility:   [facility] [[id number]]   Code Status: FULL CODE      CHIEF COMPLAINT/REASON FOR VISIT:  Chief Complaint   Patient presents with     Review Of Multiple Medical Conditions       HISTORY:      HPI: Cristal is a 68 y.o. female  being seen for routine visit in the long-term care facility [facility]. She has been a resident here since October 2011. She does have multiple complex co morbidities. She is treated for hypertension and has insulin-dependent diabetes mellitus. She has a endocrinologist and had a recent appointment Nursing will be faxing the last week of blood sugars to endocrinology today. She had recent adjustments made to her insulin but continues to remain high. I am told she does not control her diet. .   She has chronic neuropathy, chronic kidney disease and is treated with Cymbalta for depression. She is on gabapentin and lyrica for neuropathy pain. She is on meds for depression.  Does require a lift for transfers. She has chronic weakness in her lower extremities.     She was seen in her room this morning. She is pleasant and compliant with exam.  She denies chest pain or shortness of breath.  She denies cough or congestion. Her BS have been elevated and endocrinology is following her.  She is  non ambulatory, has impaired mobility of both hands but does report relief with gabapentin and lyrica. Her last AIC was 7.7 on 4/24/18.    Past Medical History:   Diagnosis Date     Allergic rhinitis      CAD (coronary artery disease)      Carpal tunnel syndrome      Cataract      Cerebral vascular accident      Chronic kidney disease      Debility      Depression      Diabetes mellitus, type II      Diabetic nephropathy      Diabetic neuropathy      GERD (gastroesophageal reflux disease)      Glaucoma      Gout      History of pyelonephritis      HTN (hypertension)      Hyperlipidemia      IDDM (insulin dependent diabetes  mellitus)     Type 2     Lower extremity edema      Myocardial infarction              Family History   Problem Relation Age of Onset     Hypertension Mother      Stroke Mother      Cancer Father      Arthritis Brother      Social History     Social History     Marital status: Single     Spouse name: N/A     Number of children: N/A     Years of education: N/A     Social History Main Topics     Smoking status: Former Smoker     Smokeless tobacco: Not on file     Alcohol use No     Drug use: No     Sexual activity: Not on file     Other Topics Concern     Not on file     Social History Narrative    10/13/2015 - The patient lives at [facility] for [facility] years.         Review of Systems   Constitutional: Positive for activity change. Negative for appetite change, chills, fatigue and fever.        Lift for transfers.    HENT: Negative for congestion and sore throat.    Respiratory: Negative for shortness of breath and wheezing.    Cardiovascular: Positive for leg swelling. Negative for chest pain.        Compression stockings   Gastrointestinal: Negative for abdominal distention, abdominal pain, constipation, diarrhea and nausea.   Genitourinary: Negative for dysuria.   Musculoskeletal: Positive for arthralgias. Negative for myalgias.   Skin: Negative for color change, rash and wound.        PU buttocks   Neurological: Positive for numbness. Negative for dizziness and weakness.        Severe neuropathy bilateral hands   Psychiatric/Behavioral: Negative for agitation, behavioral problems and sleep disturbance.       .  Vitals:    06/06/18 1209   BP: 123/57   Pulse: 77   Resp: 20   Temp: 98  F (36.7  C)   SpO2: 95%   Weight: (!) 241 lb 8 oz (109.5 kg)       Physical Exam   Constitutional: She is oriented to person, place, and time. She appears well-developed and well-nourished.   Pleasant woman in no acute distress.   HENT:   Head: Normocephalic.   Eyes: Conjunctivae are normal.   Neck: Normal range of motion.    Cardiovascular: Normal rate, regular rhythm and normal heart sounds.    No murmur heard.  Pulmonary/Chest: Breath sounds normal. No respiratory distress. She has no wheezes. She has no rales.   Abdominal: Soft. Bowel sounds are normal. She exhibits no distension. There is no tenderness.   Musculoskeletal: She exhibits edema.   Wearing Compression socks   Neurological: She is alert and oriented to person, place, and time.   Neuropathy bilateral hands.   Skin: Skin is warm.   Stage 2 pressure ulcers left and right buttock, followed by facility wound nurse.    Psychiatric: She has a normal mood and affect. Her behavior is normal.         LABS:    AiC 4/24/18 was 7.7 which is down from 7.9  9/13/17  BMP  BUN 28  Creatinine 1.20  GFR 55    Current Outpatient Prescriptions   Medication Sig Note     acetaminophen (TYLENOL) 500 MG tablet Take 1,000 mg by mouth daily as needed. Give at bedtime; also has additional pm orders. For hand pain. Don't exceed 3000mg/24hrs      aspirin 81 mg chewable tablet Chew 81 mg daily.      atorvastatin (LIPITOR) 80 MG tablet Take 80 mg by mouth bedtime.       calcium carbonate-vit D3-min 600 mg calcium- 400 unit Tab Take 1 tablet by mouth 2 (two) times a day.       chlorthalidone (HYGROTEN) 25 MG tablet Take 25 mg by mouth daily.       dorzolamide-timolol (COSOPT) 22.3-6.8 mg/mL ophthalmic solution Administer 1 drop to both eyes 2 (two) times a day.       DULoxetine (CYMBALTA) 60 MG capsule Take 60 mg by mouth daily.      ferrous sulfate 325 (65 FE) MG tablet Take 1 tablet by mouth daily with breakfast.      folic acid (FOLVITE) 1 MG tablet Take 1 mg by mouth daily.      gabapentin (NEURONTIN) 300 MG capsule Take 1,000 mg by mouth 3 (three) times a day.  4/25/2018: 1000 mg= 3 x 300 mg + 1 x 100 mg     insulin aspart U-100 (NOVOLOG) 100 unit/mL injection pen Inject 76-96 Units under the skin 3 (three) times a day before meals. Please see MAR      insulin aspart U-100 (NOVOLOG) 100 unit/mL  injection Inject under the skin see administration instructions. Give three times a day before meals using sliding scale. 201-249= 2 units, 250-299= 4 units, 300-349= 6 units, 350-399= 8 units, 400-450= 10 units, and 451+ = 12 units. Goal is to eventually get rid of sliding scale.      ketoconazole (NIZORAL) 2 % shampoo Apply 1 application topically 2 (two) times a week. Apply to damp skin, lather, leave on 5 minutes, and rinse. Apply on Wednesdays and Sundays.      LANTUS SOLOSTAR U-100 INSULIN 100 unit/mL (3 mL) pen Inject 75 Units under the skin 2 (two) times a day. 11.65 Type 2 with hyperglycemia  Contact provider if insulin prescribed is not the preferred insulin per insurance. (Patient taking differently: Inject 100 Units under the skin 2 (two) times a day. 11.65 Type 2 with hyperglycemia  Contact provider if insulin prescribed is not the preferred insulin per insurance.)      latanoprost (XALATAN) 0.005 % ophthalmic solution Administer 1 drop to both eyes bedtime.       liraglutide (VICTOZA) 0.6 mg/0.1 mL (18 mg/3 mL) PnIj injection Inject 1.8 mg under the skin daily. Call [phone] Dr. Washington if she develops nausea lasting >3 days.      lisinopril (PRINIVIL,ZESTRIL) 40 MG tablet Take 40 mg by mouth daily. Hold for SBP <110      loratadine (CLARITIN) 10 mg tablet Take 10 mg by mouth daily as needed for allergies.      metoprolol tartrate (LOPRESSOR) 100 MG tablet Take 100 mg by mouth 2 (two) times a day.      mineral oil Drop Administer 4 drops into both ears 2 (two) times a day as needed. Every day shift, apply on Q-tip and rub into ears.       nitroglycerin (NITROSTAT) 0.4 MG SL tablet Place 0.4 mg under the tongue every 5 (five) minutes as needed for chest pain. 1 tablet SL Q 5 minutes up to 3 doses. Call [phone] if chest pain persists.      olopatadine 0.2 % Drop Apply 1 drop to eye daily as needed.      pimecrolimus (ELIDEL) 1 % cream Apply 1 application topically 2 (two) times a day as needed. Apply to  rash on both ears      pregabalin (LYRICA) 50 MG capsule Take 50 mg by mouth 3 (three) times a day.      ranitidine (ZANTAC) 150 MG tablet Take 150 mg by mouth once daily.       senna-docusate (SENNOSIDES-DOCUSATE SODIUM) 8.6-50 mg tablet Take 1 tablet by mouth daily as needed for constipation.      traMADol (ULTRAM) 50 mg tablet Take 1 tablet (50 mg total) by mouth 3 (three) times a day as needed for pain. For pain 6 or higher      triamcinolone (KENALOG) 0.1 % ointment Apply 1 application topically daily. Apply to legs in the morning  And to right ear prn BID        ASSESSMENT:      ICD-10-CM    1. Depression, unspecified depression type F32.9    2. Type 2 diabetes mellitus with hyperglycemia, with long-term current use of insulin E11.65     Z79.4    3. Essential hypertension I10    4. Stage 2 chronic kidney disease N18.2        PLAN:    IDDM. FS are labile, she is on high doses of  meal coverage. She was recently seen by the endocrinologist and she put back on her  Lantus  100mg two times a day and victoza. A1C  4/2018 was 7.7 which is down from 7.9.    HTN. BPs elevated. Increase  Lisinpril, continue metoprolol  Neuropathy.mainly  hands with reports of feet also . On lyrica and gabapentin.  CAD. HX  MI and stent.         Electronically signed by: Shawna Mesa CNP

## 2021-06-18 NOTE — PROGRESS NOTES
[facility]      Facility:    [facility] [[id number]]  Code Status: FULL CODE       Chief Complaint/Reason for Visit:  Chief Complaint   Patient presents with     H & P     Re-admit to LTC after hospitalization for chest pain. (H & P 5/17/18).       HPI:   Cristal is a 68 y.o. female  Cristal Preciado is a 68 y.o. old female  with known coronary artery disease having had PCI with stent placement in 2009, insulin-dependent diabetes, history of CVA with paraplegia and also wheelchair-bound, chronic kidney disease and hypertension who presents for evaluation of chest pain.     The patient was in her usual state of health up until about 9 PM on the day of presentation when she developed acute onset chest pain mostly left-sided with radiation across her chest, which is reproducible on palpation.  Her chest pain was not exertional and nonpleuritic.  At the time of my evaluation her chest pain has improved significant but still present and reproducible on exam.  The patient denies any associated shortness of breath, diaphoresis, orthopnea or lower extremity edema.  She has history of PCI and bare metal stent placement in 2009.  Her last echocardiogram showed normal EF of 65%.  The patient had nuclear stress test last year on May 2017 which was negative for inducible myocardial ischemia.      The patient also endorses nausea, headache but denies any emesis.  She denies any fever, chills or rigors.  No abdominal pain reported. She denies diarrhea, dysuria, hematuria, lower extremity edema, or rash.  Of note the patient is wheelchair-bound at baseline secondary to CVA with residual bilateral lower extremity weakness.      On admission BP is stable. Troponin < 0.01, serum creatinine 1.13.  WBC 4.3, hemoglobin 9, blood glucose 220.  Chest x-ray without any focal airspace disease. EKG without any ischemic changes.    Atypical Chest Pain. Coronary artery disease.  Cristal Preciado is a 68  y/o female with known coronary artery disease s/p PCI with stent placement in 2009 who  presented to [facility]  with sudden onset left sided chest pain occuring rest. Her chest pain was reproducible on exam.  Negative 3 sets of troponin. EKG showed no significant ischemic changes. Pharmacological stress was negative for inducible ischemia  - Continue beta-blocker, aspirin, atorvastatin.        Severe hypomagnesemia of 1.1  - replaced, recheck of 1.9 on the day of discharge  - recheck at the nursing home in 1-2 days     Insulin-dependent diabetes  Last A1c was 7.9, on admission blood glucose in the 200 range.    - c/w Lantus 75  BID and victoza  - sliding scale insulin hypoglycemia protocol.   - PTA Novolog dosing were significantly higher than what she was getting during hospitalization and will need further adjustment       History of CVA with residual weakness.   - History of remote CVA with residual weakness and is now wheelchair bound.   - Physical therapy to assess mobility and determine needs with transfers. Fall Precautions.      Essential hypertension.    - continue lisinopril and  chlorthalidone.      Diabetic peripheral neuropathy.   - Continue gabapentin and Lyrica.      Mixed hyperlipidemia.   - Continue atorvastatin.      Macrocytic anemia.    - Hemoglobin stable at 9, continue to monitor Hgb.      GERD without esophagitis  - On ranitidine.      Depression and Anxiety  - Continue Cymbalta.      Glaucoma  -  Continue ophthalmic drops.        Past Medical History:  Past Medical History:   Diagnosis Date     Allergic rhinitis      CAD (coronary artery disease)      Carpal tunnel syndrome      Cataract      Cerebral vascular accident      Chronic kidney disease      Debility      Depression      Diabetes mellitus, type II      Diabetic nephropathy      Diabetic neuropathy      GERD (gastroesophageal reflux disease)      Glaucoma      Gout      History of pyelonephritis      HTN (hypertension)       Hyperlipidemia      IDDM (insulin dependent diabetes mellitus)     Type 2     Lower extremity edema      Myocardial infarction            Surgical History:  Past Surgical History:   Procedure Laterality Date     CARDIAC CATHETERIZATION       CORONARY STENT PLACEMENT  03/19/2004    RCA       Family History:   Family History   Problem Relation Age of Onset     Hypertension Mother      Stroke Mother      Cancer Father      Arthritis Brother        Social History:    Social History     Social History     Marital status: Single     Spouse name: N/A     Number of children: N/A     Years of education: N/A     Social History Main Topics     Smoking status: Former Smoker     Smokeless tobacco: Not on file     Alcohol use No     Drug use: No     Sexual activity: Not on file     Other Topics Concern     Not on file     Social History Narrative    10/13/2015 - The patient lives at [facility] for 5 years.       Medications:  Current Outpatient Prescriptions   Medication Sig Note     acetaminophen (TYLENOL) 500 MG tablet Take 1,000 mg by mouth 2 (two) times a day. Give at bedtime; also has additional pm orders. For hand pain. Don't exceed 3000mg/24hrs      acetaminophen (TYLENOL) 500 MG tablet Take 500-1,000 mg by mouth every 6 (six) hours as needed for pain. Don't exceed 3000mg/24hrs. 500mg for pain 1-5 and 1000 mg for pain 6-10      aspirin 81 mg chewable tablet Chew 81 mg daily.      atorvastatin (LIPITOR) 80 MG tablet Take 80 mg by mouth bedtime.       calcium carbonate-vit D3-min 600 mg calcium- 400 unit Tab Take 1 tablet by mouth 2 (two) times a day.       chlorthalidone (HYGROTEN) 25 MG tablet Take 25 mg by mouth daily.       docusate sodium (COLACE) 50 mg/5 mL oral liquid Administer 30-50 mg into both ears as needed. To clear ears of cerumen      dorzolamide-timolol (COSOPT) 22.3-6.8 mg/mL ophthalmic solution Administer 1 drop to both eyes 2 (two) times a day.       DULoxetine (CYMBALTA) 60 MG capsule Take 60 mg by  mouth daily.      ferrous sulfate 325 (65 FE) MG tablet Take 1 tablet by mouth daily with breakfast.      folic acid (FOLVITE) 1 MG tablet Take 1 mg by mouth daily.      gabapentin (NEURONTIN) 300 MG capsule Take 1,000 mg by mouth 3 (three) times a day.  4/25/2018: 1000 mg= 3 x 300 mg + 1 x 100 mg     insulin aspart U-100 (NOVOLOG) 100 unit/mL injection Inject under the skin see administration instructions. Give three times a day before meals using sliding scale. 201-249= 2 units, 250-299= 4 units, 300-349= 6 units, 350-399= 8 units, 400-450= 10 units, and 451+ = 12 units. Goal is to eventually get rid of sliding scale.      ketoconazole (NIZORAL) 2 % shampoo Apply 1 application topically 2 (two) times a week. Apply to damp skin, lather, leave on 5 minutes, and rinse. Apply on Wednesdays and Sundays.      LANTUS SOLOSTAR U-100 INSULIN 100 unit/mL (3 mL) pen Inject 75 Units under the skin 2 (two) times a day. 11.65 Type 2 with hyperglycemia  Contact provider if insulin prescribed is not the preferred insulin per insurance.      latanoprost (XALATAN) 0.005 % ophthalmic solution Administer 1 drop to both eyes bedtime.       liraglutide (VICTOZA) 0.6 mg/0.1 mL (18 mg/3 mL) PnIj injection Inject 1.8 mg under the skin daily. Call [phone] Dr. Washington if she develops nausea lasting >3 days.      lisinopril (PRINIVIL,ZESTRIL) 40 MG tablet Take 40 mg by mouth daily. Hold for SBP <110      loratadine (CLARITIN) 10 mg tablet Take 10 mg by mouth daily as needed for allergies.      metoprolol tartrate (LOPRESSOR) 100 MG tablet Take 100 mg by mouth 2 (two) times a day.      mineral oil Drop Administer 4 drops into both ears 2 (two) times a day as needed. Every day shift, apply on Q-tip and rub into ears.       nitroglycerin (NITROSTAT) 0.4 MG SL tablet Place 0.4 mg under the tongue every 5 (five) minutes as needed for chest pain. 1 tablet SL Q 5 minutes up to 3 doses. Call [phone] if chest pain persists.      pimecrolimus  (ELIDEL) 1 % cream Apply 1 application topically 2 (two) times a day as needed. Apply to rash on both ears      pregabalin (LYRICA) 50 MG capsule Take 50 mg by mouth 3 (three) times a day.      ranitidine (ZANTAC) 150 MG tablet Take 150 mg by mouth once daily.       senna-docusate (SENNOSIDES-DOCUSATE SODIUM) 8.6-50 mg tablet Take 1 tablet by mouth daily as needed for constipation.      traMADol (ULTRAM) 50 mg tablet Take 1 tablet (50 mg total) by mouth 3 (three) times a day as needed for pain. For pain 6 or higher      triamcinolone (KENALOG) 0.1 % ointment Apply 1 application topically daily. Apply to legs in the morning  And to right ear prn BID        Allergies:  Allergies   Allergen Reactions     Nuts - Unspecified      Unable To Assess      Mountain Dew per [facility] medication review report        Review of Systems:  Pertinent items are noted in HPI.      Physical Exam:   General: Patient is alert,   Vitals: BP , Temp , Pulse , RR , O2 sat .  HEENT: Head is NCAT. Eyes show no injection or icterus. Nares negative. Oropharynx well hydrated.  Neck: Supple. No tenderness or adenopathy. No JVD.  Lungs: Clear bilaterally. No wheezes.  Cardiovascular: Regular rate and rhythm, normal S1. S2.  Back: No spinal or CVA tenderness.  Abdomen: Soft, no tenderness on exam. Bowel sounds present. No guarding rebound or rigidity.  : Deferred.  Extremities: No edema is noted.  Musculoskeletal:   Skin:  Psych: Mood appears good.      Labs:  Results for orders placed or performed during the hospital encounter of 04/23/18   Basic Metabolic Panel   Result Value Ref Range    Sodium 138 136 - 145 mmol/L    Potassium 4.1 3.5 - 5.0 mmol/L    Chloride 108 (H) 98 - 107 mmol/L    CO2 19 (L) 22 - 31 mmol/L    Anion Gap, Calculation 11 5 - 18 mmol/L    Glucose 217 (H) 70 - 125 mg/dL    Calcium 9.4 8.5 - 10.5 mg/dL    BUN 15 8 - 22 mg/dL    Creatinine 0.92 0.60 - 1.10 mg/dL    GFR MDRD Af Amer >60 >60 mL/min/1.73m2    GFR MDRD Non Af  Amer >60 >60 mL/min/1.73m2       Lab Results   Component Value Date    WBC 4.3 04/24/2018    HGB 9.4 (L) 04/24/2018    HCT 28.6 (L) 04/24/2018     (H) 04/24/2018     04/26/2018         Assessment/Plan:  1. Chest pain. Follow up with cardiology.   2. IDDM. Monitor blood sugars.  3. HTN. Cont usual meds.   4. Neuropathy. Cymbalta, lyrica.   5. Hx CVA. Debilitated, non ambulatory.   6. Depression and anxiety.  7. Glaucoma.    Total time greater than 45 minutes, greater than 50% counseling and coordination of care, time spent in interview and examination of patient, review of records, discussion with nursing staff.      Electronically signed by: Meagan Valdez MD

## 2021-06-19 NOTE — PROGRESS NOTES
[facility]    Facility:   [facility] [[id number]]   Code Status: FULL CODE       Chief Complaint   Patient presents with     Review Of Multiple Medical Conditions     [place] 7/31/18.       HPI:   Cristal Preciado is a 68 y.o. old female with known coronary artery disease having had PCI with stent placement in 2009, insulin-dependent diabetes, history of CVA with paraplegia and also wheelchair-bound, chronic kidney disease and hypertension who presents for evaluation of chest pain.     The patient was in her usual state of health up until about 9 PM on the day of presentation when she developed acute onset chest pain mostly left-sided with radiation across her chest, which is reproducible on palpation.  Her chest pain was not exertional and nonpleuritic.  At the time of my evaluation her chest pain has improved significant but still present and reproducible on exam.  The patient denies any associated shortness of breath, diaphoresis, orthopnea or lower extremity edema.  She has history of PCI and bare metal stent placement in 2009.  Her last echocardiogram showed normal EF of 65%.  The patient had nuclear stress test last year on May 2017 which was negative for inducible myocardial ischemia.      The patient also endorses nausea, headache but denies any emesis.  She denies any fever, chills or rigors.  No abdominal pain reported. She denies diarrhea, dysuria, hematuria, lower extremity edema, or rash.  Of note the patient is wheelchair-bound at baseline secondary to CVA with residual bilateral lower extremity weakness.      On admission BP is stable. Troponin < 0.01, serum creatinine 1.13.  WBC 4.3, hemoglobin 9, blood glucose 220.  Chest x-ray without any focal airspace disease. EKG without any ischemic changes.    Atypical Chest Pain. Coronary artery disease.  Cristal Preciado is a 69 y/o female with known coronary artery disease s/p PCI with stent placement in 2009 who   presented to [facility]  with sudden onset left sided chest pain occuring rest. Her chest pain was reproducible on exam.  Negative 3 sets of troponin. EKG showed no significant ischemic changes. Pharmacological stress was negative for inducible ischemia  - Continue beta-blocker, aspirin, atorvastatin.        Severe hypomagnesemia of 1.1  - replaced, recheck of 1.9 on the day of discharge  - recheck at the nursing home in 1-2 days     Insulin-dependent diabetes  Last A1c was 7.9, on admission blood glucose in the 200 range.    - c/w Lantus 75  BID and victoza  - sliding scale insulin hypoglycemia protocol.   - PTA Novolog dosing were significantly higher than what she was getting during hospitalization and will need further adjustment       History of CVA with residual weakness.   - History of remote CVA with residual weakness and is now wheelchair bound.   - Physical therapy to assess mobility and determine needs with transfers. Fall Precautions.      Essential hypertension.    - continue lisinopril and  chlorthalidone.      Diabetic peripheral neuropathy.   - Continue gabapentin and Lyrica.      Mixed hyperlipidemia.   - Continue atorvastatin.      Macrocytic anemia.    - Hemoglobin stable at 9, continue to monitor Hgb.      GERD without esophagitis  - On ranitidine.      Depression and Anxiety  - Continue Cymbalta.      Glaucoma  -  Continue ophthalmic drops.        Past Medical History:  Past Medical History:   Diagnosis Date     Allergic rhinitis      CAD (coronary artery disease)      Carpal tunnel syndrome      Cataract      Cerebral vascular accident (H)      Chronic kidney disease      Debility      Depression      Diabetes mellitus, type II (H)      Diabetic nephropathy (H)      Diabetic neuropathy (H)      GERD (gastroesophageal reflux disease)      Glaucoma      Gout      History of pyelonephritis      HTN (hypertension)      Hyperlipidemia      IDDM (insulin dependent diabetes mellitus) (H)     Type 2      Lower extremity edema      Myocardial infarction        Medications:  Current Outpatient Prescriptions   Medication Sig Note     acetaminophen (TYLENOL) 500 MG tablet Take 1,000 mg by mouth daily as needed. Give at bedtime; also has additional pm orders. For hand pain. Don't exceed 3000mg/24hrs      aspirin 81 mg chewable tablet Chew 81 mg daily.      atorvastatin (LIPITOR) 80 MG tablet Take 80 mg by mouth bedtime.       calcium carbonate-vit D3-min 600 mg calcium- 400 unit Tab Take 1 tablet by mouth 2 (two) times a day.       chlorthalidone (HYGROTEN) 25 MG tablet Take 25 mg by mouth daily.       dorzolamide-timolol (COSOPT) 22.3-6.8 mg/mL ophthalmic solution Administer 1 drop to both eyes 2 (two) times a day.       DULoxetine (CYMBALTA) 60 MG capsule Take 60 mg by mouth daily.      ferrous sulfate 325 (65 FE) MG tablet Take 1 tablet by mouth daily with breakfast.      folic acid (FOLVITE) 1 MG tablet Take 1 mg by mouth daily.      gabapentin (NEURONTIN) 300 MG capsule Take 1,000 mg by mouth 3 (three) times a day.  4/25/2018: 1000 mg= 3 x 300 mg + 1 x 100 mg     insulin aspart U-100 (NOVOLOG) 100 unit/mL injection pen Inject 76-96 Units under the skin 3 (three) times a day before meals. Please see MAR      insulin aspart U-100 (NOVOLOG) 100 unit/mL injection Inject under the skin see administration instructions. Give three times a day before meals using sliding scale. 201-249= 2 units, 250-299= 4 units, 300-349= 6 units, 350-399= 8 units, 400-450= 10 units, and 451+ = 12 units. Goal is to eventually get rid of sliding scale.      ketoconazole (NIZORAL) 2 % shampoo Apply 1 application topically 2 (two) times a week. Apply to damp skin, lather, leave on 5 minutes, and rinse. Apply on Wednesdays and Sundays.      LANTUS SOLOSTAR U-100 INSULIN 100 unit/mL (3 mL) pen Inject 75 Units under the skin 2 (two) times a day. 11.65 Type 2 with hyperglycemia  Contact provider if insulin prescribed is not the preferred insulin  per insurance. (Patient taking differently: Inject 100 Units under the skin 2 (two) times a day. 11.65 Type 2 with hyperglycemia  Contact provider if insulin prescribed is not the preferred insulin per insurance.)      latanoprost (XALATAN) 0.005 % ophthalmic solution Administer 1 drop to both eyes bedtime.       liraglutide (VICTOZA) 0.6 mg/0.1 mL (18 mg/3 mL) PnIj injection Inject 1.8 mg under the skin daily. Call [phone] Dr. [place] if she develops nausea lasting >3 days.      lisinopril (PRINIVIL,ZESTRIL) 40 MG tablet Take 40 mg by mouth daily. Hold for SBP <110      loratadine (CLARITIN) 10 mg tablet Take 10 mg by mouth daily as needed for allergies.      metoprolol tartrate (LOPRESSOR) 100 MG tablet Take 100 mg by mouth 2 (two) times a day.      mineral oil Drop Administer 4 drops into both ears 2 (two) times a day as needed. Every day shift, apply on Q-tip and rub into ears.       nitroglycerin (NITROSTAT) 0.4 MG SL tablet Place 0.4 mg under the tongue every 5 (five) minutes as needed for chest pain. 1 tablet SL Q 5 minutes up to 3 doses. Call [phone] if chest pain persists.      olopatadine 0.2 % Drop Apply 1 drop to eye daily as needed.      pimecrolimus (ELIDEL) 1 % cream Apply 1 application topically 2 (two) times a day as needed. Apply to rash on both ears      pregabalin (LYRICA) 50 MG capsule Take 50 mg by mouth 3 (three) times a day.      ranitidine (ZANTAC) 150 MG tablet Take 150 mg by mouth once daily.       senna-docusate (SENNOSIDES-DOCUSATE SODIUM) 8.6-50 mg tablet Take 1 tablet by mouth daily as needed for constipation.      traMADol (ULTRAM) 50 mg tablet Take 1 tablet (50 mg total) by mouth 3 (three) times a day as needed for pain. For pain 6 or higher      triamcinolone (KENALOG) 0.1 % ointment Apply 1 application topically daily. Apply to legs in the morning  And to right ear prn BID        Physical Exam:   General: Patient is alert female, no distress.   HEENT: Head is NCAT. Eyes show no  injection or icterus. Nares negative. Oropharynx well hydrated.  Neck: Supple. No tenderness or adenopathy. No JVD.  Lungs: Clear bilaterally. No wheezes.  Cardiovascular: Regular rate and rhythm, normal S1, S2.  Back: No spinal or CVA tenderness.  Abdomen: Soft, no tenderness on exam. Bowel sounds present. No guarding rebound or rigidity.  : Deferred.  Extremities: LE edema is noted.  Musculoskeletal: Degen changes  Psych: Mood appears good.      Labs:  Results for orders placed or performed during the hospital encounter of 04/23/18   Basic Metabolic Panel   Result Value Ref Range    Sodium 138 136 - 145 mmol/L    Potassium 4.1 3.5 - 5.0 mmol/L    Chloride 108 (H) 98 - 107 mmol/L    CO2 19 (L) 22 - 31 mmol/L    Anion Gap, Calculation 11 5 - 18 mmol/L    Glucose 217 (H) 70 - 125 mg/dL    Calcium 9.4 8.5 - 10.5 mg/dL    BUN 15 8 - 22 mg/dL    Creatinine 0.92 0.60 - 1.10 mg/dL    GFR MDRD Af Amer >60 >60 mL/min/1.73m2    GFR MDRD Non Af Amer >60 >60 mL/min/1.73m2       Lab Results   Component Value Date    WBC 4.3 04/24/2018    HGB 9.4 (L) 04/24/2018    HCT 28.6 (L) 04/24/2018     (H) 04/24/2018     04/26/2018         Assessment/Plan:  1. Diabetes. Recent change in medication regimen per endocrinologist/diabetic specialist.  2. HTN. On BP meds, Cont to monitor.   3. Chronic pain, neuropathy. On gabapentin, Cymbalta.  4. Debilitation. Hx CVA. Wheelchair bound, non ambulatory.  5. Glaucoma. Vision impairment.       Electronically signed by: Meagan Valdez MD

## 2021-06-19 NOTE — LETTER
Letter by Shawna Mesa CNP at      Author: Shawna Mesa CNP Service: -- Author Type: --    Filed:  Encounter Date: 7/10/2019 Status: (Other)         Patient: Cristal Preciado   MR Number: [id number]   YOB: 1950   Date of Visit: 7/10/2019     [facility]    Facility:   [facility] [[id number]]   Code Status: FULL CODE      CHIEF COMPLAINT/REASON FOR VISIT:  Chief Complaint   Patient presents with   ? Review Of Multiple Medical Conditions       HISTORY:      HPI: Cristal is a 69 y.o. female  being seen for routine visit in the long-term care facility [facility]. She has been a resident here since October 2011. She does have multiple complex co morbidities. She is treated for hypertension and has insulin-dependent diabetes mellitus. She has a endocrinologist who is following her blood sugars. Nursing is sending weekly checks to her endocrinologist.    She has chronic neuropathy, chronic kidney disease and is treated with Cymbalta for depression. She is on gabapentin and lyrica for neuropathy pain. She is on meds for depression.  Does require a lift for transfers. She has chronic weakness in her lower extremities.     Today she is seen in her room for a routine medical visit to review multiple medical issues.  She denies chest pain or shortness of breath.  She denies cough or congestion.   She is  non ambulatory, She is on  gabapentin and lyrica for neuropathy.. Her BS appear to be more controlled and she is followed by endocrinology. Last A1c 7.0 1/10/19  which is down from 8.3. She is scheduled for a possible hysterectomy next month and will see Cardiology on Monday for clearance. She does report intermittent right chest wall pain and tells me her cardiologist reported it as due to stress. She tells me she had a workup done. She denied it today. She reports sometimes the pain lasts all day and other times it is short.     Past Medical History:    Diagnosis Date   ? Allergic rhinitis    ? CAD (coronary artery disease)    ? Carpal tunnel syndrome    ? Cataract    ? Cerebral vascular accident (H)    ? Chronic kidney disease    ? Debility    ? Depression    ? Diabetes mellitus, type II (H)    ? Diabetic nephropathy (H)    ? Diabetic neuropathy (H)    ? GERD (gastroesophageal reflux disease)    ? Glaucoma    ? Gout    ? History of pyelonephritis    ? HTN (hypertension)    ? Hyperlipidemia    ? IDDM (insulin dependent diabetes mellitus) (H)     Type 2   ? Lower extremity edema    ? Myocardial infarction              Family History   Problem Relation Age of Onset   ? Hypertension Mother    ? Stroke Mother    ? Cancer Father    ? Arthritis Brother      Social History     Socioeconomic History   ? Marital status: Single     Spouse name: Not on file   ? Number of children: Not on file   ? Years of education: Not on file   ? Highest education level: Not on file   Occupational History   ? Not on file   Social Needs   ? Financial resource strain: Not on file   ? Food insecurity:     Worry: Not on file     Inability: Not on file   ? Transportation needs:     Medical: Not on file     Non-medical: Not on file   Tobacco Use   ? Smoking status: Former Smoker   Substance and Sexual Activity   ? Alcohol use: No   ? Drug use: No   ? Sexual activity: Not on file   Lifestyle   ? Physical activity:     Days per week: Not on file     Minutes per session: Not on file   ? Stress: Not on file   Relationships   ? Social connections:     Talks on phone: Not on file     Gets together: Not on file     Attends Restorationist service: Not on file     Active member of club or organization: Not on file     Attends meetings of clubs or organizations: Not on file     Relationship status: Not on file   ? Intimate partner violence:     Fear of current or ex partner: Not on file     Emotionally abused: Not on file     Physically abused: Not on file     Forced sexual activity: Not on file   Other  Topics Concern   ? Not on file   Social History Narrative    10/13/2015 - The patient lives at [facility] for 5 years.         Review of Systems   Constitutional: Positive for activity change. Negative for appetite change, chills, fatigue and fever.        Lift for transfers.    HENT: Negative for congestion and sore throat.    Respiratory: Negative for shortness of breath and wheezing.    Cardiovascular: Positive for leg swelling. Negative for chest pain.        Compression stockings   Gastrointestinal: Negative for abdominal distention, abdominal pain, constipation, diarrhea and nausea.   Genitourinary: Negative for dysuria.   Musculoskeletal: Positive for arthralgias. Negative for myalgias.   Skin: Negative for color change, rash and wound.   Neurological: Positive for numbness. Negative for dizziness and weakness.        Severe neuropathy bilateral hands   Psychiatric/Behavioral: Negative for agitation, behavioral problems and sleep disturbance.       .  Vitals:    07/11/19 0504   BP: 129/63   Pulse: 80   Resp: 20   Temp: 98  F (36.7  C)   SpO2: 98%   Weight: (!) 229 lb (103.9 kg)       Physical Exam   Constitutional: She is oriented to person, place, and time. She appears well-developed and well-nourished.   Pleasant woman in no acute distress.   HENT:   Head: Normocephalic.   Eyes: Conjunctivae are normal.   Neck: Normal range of motion.   Cardiovascular: Normal rate, regular rhythm and normal heart sounds.   No murmur heard.  Pulmonary/Chest: Breath sounds normal. No respiratory distress. She has no wheezes. She has no rales.   Abdominal: Soft. Bowel sounds are normal. She exhibits no distension. There is no tenderness.   Musculoskeletal: She exhibits edema.   Wearing Compression socks   Neurological: She is alert and oriented to person, place, and time.   Neuropathy bilateral hands.   Skin: Skin is warm.   Psychiatric: She has a normal mood and affect. Her behavior is normal.         LABS:    AIC 1/10/19  7.0  No results found for this or any previous visit (from the past 240 hour(s)).Aic 9/7/18 8.3      Current Outpatient Medications   Medication Sig   ? acetaminophen (TYLENOL) 500 MG tablet Take 1,000 mg by mouth 3 (three) times a day .         ? aspirin 81 mg chewable tablet Chew 81 mg daily.   ? atorvastatin (LIPITOR) 80 MG tablet Take 80 mg by mouth bedtime.    ? calcium carbonate-vit D3-min 600 mg calcium- 400 unit Tab Take 1 tablet by mouth 2 (two) times a day.    ? chlorthalidone (HYGROTEN) 25 MG tablet Take 25 mg by mouth daily.    ? dorzolamide-timolol (COSOPT) 22.3-6.8 mg/mL ophthalmic solution Administer 1 drop to both eyes 2 (two) times a day.    ? DULoxetine (CYMBALTA) 60 MG capsule Take 60 mg by mouth daily.   ? ferrous sulfate 325 (65 FE) MG tablet Take 1 tablet by mouth daily with breakfast.   ? folic acid (FOLVITE) 1 MG tablet Take 1 mg by mouth daily.   ? gabapentin (NEURONTIN) 300 MG capsule Take 600 mg by mouth 3 (three) times a day.          ? insulin regular hum U-500 conc, HumuLIN R, (HUMULIN R CONC) 500 unit/mL CONCENTRATED injection Inject 60 Units under the skin 3 (three) times a day Call if readings <70 or >350 .   ? ketoconazole (NIZORAL) 2 % shampoo Apply 1 application topically 2 (two) times a week Apply to damp skin, lather, leave on 5 minutes, and rinse. Apply on Wednesdays and Saturdays..         ? latanoprost (XALATAN) 0.005 % ophthalmic solution Administer 1 drop to both eyes bedtime.    ? liraglutide (VICTOZA) 0.6 mg/0.1 mL (18 mg/3 mL) PnIj injection Inject 1.8 mg under the skin daily. Call [phone] Dr. Washington if she develops nausea lasting >3 days.   ? lisinopril (PRINIVIL,ZESTRIL) 40 MG tablet Take 40 mg by mouth daily. Hold for SBP <110   ? loratadine (CLARITIN) 10 mg tablet Take 10 mg by mouth daily as needed for allergies.   ? LYRICA 75 mg capsule TAKE 1 CAP BY MOUTH THREE TIMES DAILY   ? metFORMIN (GLUCOPHAGE-XR) 500 MG 24 hr tablet Take 1,000 mg by mouth daily with  breakfast.   ? metoprolol tartrate (LOPRESSOR) 100 MG tablet Take 100 mg by mouth 2 (two) times a day.   ? mineral oil Drop Administer 3 drops into both ears daily as needed Every day shift, apply on Q-tip and rub into ears. .         ? nitroglycerin (NITROSTAT) 0.4 MG SL tablet Place 0.4 mg under the tongue every 5 (five) minutes as needed for chest pain. 1 tablet SL Q 5 minutes up to 3 doses. Call 911 if chest pain persists.   ? olopatadine 0.2 % Drop Apply 1 drop to eye daily as needed.   ? ranitidine (ZANTAC) 150 MG tablet Take 150 mg by mouth once daily.    ? senna-docusate (SENNOSIDES-DOCUSATE SODIUM) 8.6-50 mg tablet Take 1 tablet by mouth daily as needed for constipation.   ? traMADol (ULTRAM) 50 mg tablet Take 1 tablet (50 mg total) by mouth 3 (three) times a day as needed for pain. For pain 6 or higher   ? triamcinolone (KENALOG) 0.1 % ointment Apply 1 application topically daily. Apply to legs in the morning  And to right ear prn BID       ASSESSMENT:      ICD-10-CM    1. IDDM (insulin dependent diabetes mellitus) (H) E11.9     Z79.4    2. Essential hypertension I10    3. Debility R53.81    4. Other chronic pain G89.29        PLAN:   IDDM. FS are stable last A1C 7.0 on 1/10/19 Endocrinologist following ,    HTN. Continue  Lisinpril and metoprolol  Neuropathy.mainly  hands with reports of feet also . On lyrica and gabapentin.  CAD. HX  MI and stent.   Hysterectomy  pending for next month pending cardiology consult           Electronically signed by: Shawna Mesa, CNP

## 2021-06-19 NOTE — LETTER
Letter by Shawna Mesa CNP at      Author: Shawna Mesa CNP Service: -- Author Type: --    Filed:  Encounter Date: 5/8/2019 Status: (Other)         Patient: Cristal Preciado   MR Number: [id number]   YOB: 1950   Date of Visit: 5/8/2019     [facility]    Facility:   [facility] [[id number]]   Code Status: FULL CODE      CHIEF COMPLAINT/REASON FOR VISIT:  Chief Complaint   Patient presents with   ? Review Of Multiple Medical Conditions       HISTORY:      HPI: Cristal is a 69 y.o. female  being seen for routine visit in the long-term care facility [facility]. She has been a resident here since October 2011. She does have multiple complex co morbidities. She is treated for hypertension and has insulin-dependent diabetes mellitus. She has a endocrinologist who is following her blood sugars. Nursing is sending weekly checks to her endocrinologist.    She has chronic neuropathy, chronic kidney disease and is treated with Cymbalta for depression. She is on gabapentin and lyrica for neuropathy pain. She is on meds for depression.  Does require a lift for transfers. She has chronic weakness in her lower extremities.     Today she is seen in her room for a routine medical visit to review multiple medical issues.  She denies chest pain or shortness of breath.  She denies cough or congestion.   She is  non ambulatory, has impaired mobility of both hands but does report relief with gabapentin and lyrica.  Today she reports an itchy air and thinks she has wax build up. Her ears were examined and she did have  a moderate  Amount of cerumen in her right ear and left ear was minimal. Her BS appear to be more controlled and she is followed by endocrinology. Last A1c 7.0 1/10/19  which is down from 8.3.    Past Medical History:   Diagnosis Date   ? Allergic rhinitis    ? CAD (coronary artery disease)    ? Carpal tunnel syndrome    ? Cataract    ? Cerebral  vascular accident (H)    ? Chronic kidney disease    ? Debility    ? Depression    ? Diabetes mellitus, type II (H)    ? Diabetic nephropathy (H)    ? Diabetic neuropathy (H)    ? GERD (gastroesophageal reflux disease)    ? Glaucoma    ? Gout    ? History of pyelonephritis    ? HTN (hypertension)    ? Hyperlipidemia    ? IDDM (insulin dependent diabetes mellitus) (H)     Type 2   ? Lower extremity edema    ? Myocardial infarction              Family History   Problem Relation Age of Onset   ? Hypertension Mother    ? Stroke Mother    ? Cancer Father    ? Arthritis Brother      Social History     Socioeconomic History   ? Marital status: Single     Spouse name: Not on file   ? Number of children: Not on file   ? Years of education: Not on file   ? Highest education level: Not on file   Occupational History   ? Not on file   Social Needs   ? Financial resource strain: Not on file   ? Food insecurity:     Worry: Not on file     Inability: Not on file   ? Transportation needs:     Medical: Not on file     Non-medical: Not on file   Tobacco Use   ? Smoking status: Former Smoker   Substance and Sexual Activity   ? Alcohol use: No   ? Drug use: No   ? Sexual activity: Not on file   Lifestyle   ? Physical activity:     Days per week: Not on file     Minutes per session: Not on file   ? Stress: Not on file   Relationships   ? Social connections:     Talks on phone: Not on file     Gets together: Not on file     Attends Druze service: Not on file     Active member of club or organization: Not on file     Attends meetings of clubs or organizations: Not on file     Relationship status: Not on file   ? Intimate partner violence:     Fear of current or ex partner: Not on file     Emotionally abused: Not on file     Physically abused: Not on file     Forced sexual activity: Not on file   Other Topics Concern   ? Not on file   Social History Narrative    10/13/2015 - The patient lives at [facility] for 5 years.          Review of Systems   Constitutional: Positive for activity change. Negative for appetite change, chills, fatigue and fever.        Lift for transfers.    HENT: Negative for congestion and sore throat.    Respiratory: Negative for shortness of breath and wheezing.    Cardiovascular: Positive for leg swelling. Negative for chest pain.        Compression stockings   Gastrointestinal: Negative for abdominal distention, abdominal pain, constipation, diarrhea and nausea.   Genitourinary: Negative for dysuria.   Musculoskeletal: Positive for arthralgias. Negative for myalgias.   Skin: Negative for color change, rash and wound.   Neurological: Positive for numbness. Negative for dizziness and weakness.        Severe neuropathy bilateral hands   Psychiatric/Behavioral: Negative for agitation, behavioral problems and sleep disturbance.       .  Vitals:    05/08/19 0834   BP: 149/47   Pulse: 72   Resp: 20   Temp: 97.2  F (36.2  C)   SpO2: 94%   Weight: (!) 225 lb 11.2 oz (102.4 kg)       Physical Exam   Constitutional: She is oriented to person, place, and time. She appears well-developed and well-nourished.   Pleasant woman in no acute distress.   HENT:   Head: Normocephalic.   Cerumen right ear   Eyes: Conjunctivae are normal.   Neck: Normal range of motion.   Cardiovascular: Normal rate, regular rhythm and normal heart sounds.   No murmur heard.  Pulmonary/Chest: Breath sounds normal. No respiratory distress. She has no wheezes. She has no rales.   Abdominal: Soft. Bowel sounds are normal. She exhibits no distension. There is no tenderness.   Musculoskeletal: She exhibits edema.   Wearing Compression socks   Neurological: She is alert and oriented to person, place, and time.   Neuropathy bilateral hands.   Skin: Skin is warm.   Psychiatric: She has a normal mood and affect. Her behavior is normal.         LABS:    AIC 1/10/19 7.0  No results found for this or any previous visit (from the past 240 hour(s)).Aic 9/7/18  8.3      Current Outpatient Medications   Medication Sig   ? acetaminophen (TYLENOL) 500 MG tablet Take 1,000 mg by mouth 3 (three) times a day .         ? aspirin 81 mg chewable tablet Chew 81 mg daily.   ? atorvastatin (LIPITOR) 80 MG tablet Take 80 mg by mouth bedtime.    ? calcium carbonate-vit D3-min 600 mg calcium- 400 unit Tab Take 1 tablet by mouth 2 (two) times a day.    ? chlorthalidone (HYGROTEN) 25 MG tablet Take 25 mg by mouth daily.    ? dorzolamide-timolol (COSOPT) 22.3-6.8 mg/mL ophthalmic solution Administer 1 drop to both eyes 2 (two) times a day.    ? DULoxetine (CYMBALTA) 60 MG capsule Take 60 mg by mouth daily.   ? ferrous sulfate 325 (65 FE) MG tablet Take 1 tablet by mouth daily with breakfast.   ? folic acid (FOLVITE) 1 MG tablet Take 1 mg by mouth daily.   ? gabapentin (NEURONTIN) 300 MG capsule Take 600 mg by mouth 3 (three) times a day.          ? insulin regular hum U-500 conc, HumuLIN R, (HUMULIN R CONC) 500 unit/mL CONCENTRATED injection Inject 60 Units under the skin 3 (three) times a day Call if readings <70 or >350 .   ? ketoconazole (NIZORAL) 2 % shampoo Apply 1 application topically 2 (two) times a week Apply to damp skin, lather, leave on 5 minutes, and rinse. Apply on Wednesdays and Saturdays..         ? latanoprost (XALATAN) 0.005 % ophthalmic solution Administer 1 drop to both eyes bedtime.    ? liraglutide (VICTOZA) 0.6 mg/0.1 mL (18 mg/3 mL) PnIj injection Inject 1.8 mg under the skin daily. Call [phone] Dr. Washington if she develops nausea lasting >3 days.   ? lisinopril (PRINIVIL,ZESTRIL) 40 MG tablet Take 40 mg by mouth daily. Hold for SBP <110   ? loratadine (CLARITIN) 10 mg tablet Take 10 mg by mouth daily as needed for allergies.   ? LYRICA 75 mg capsule TAKE 1 CAP BY MOUTH THREE TIMES DAILY   ? metFORMIN (GLUCOPHAGE-XR) 500 MG 24 hr tablet Take 1,000 mg by mouth daily with breakfast.   ? metoprolol tartrate (LOPRESSOR) 100 MG tablet Take 100 mg by mouth 2 (two)  times a day.   ? mineral oil Drop Administer 3 drops into both ears daily as needed Every day shift, apply on Q-tip and rub into ears. .         ? nitroglycerin (NITROSTAT) 0.4 MG SL tablet Place 0.4 mg under the tongue every 5 (five) minutes as needed for chest pain. 1 tablet SL Q 5 minutes up to 3 doses. Call 911 if chest pain persists.   ? olopatadine 0.2 % Drop Apply 1 drop to eye daily as needed.   ? ranitidine (ZANTAC) 150 MG tablet Take 150 mg by mouth once daily.    ? senna-docusate (SENNOSIDES-DOCUSATE SODIUM) 8.6-50 mg tablet Take 1 tablet by mouth daily as needed for constipation.   ? traMADol (ULTRAM) 50 mg tablet Take 1 tablet (50 mg total) by mouth 3 (three) times a day as needed for pain. For pain 6 or higher   ? triamcinolone (KENALOG) 0.1 % ointment Apply 1 application topically daily. Apply to legs in the morning  And to right ear prn BID       ASSESSMENT:      ICD-10-CM    1. IDDM (insulin dependent diabetes mellitus) (H) E11.9     Z79.4    2. Essential hypertension I10    3. Other chronic pain G89.29    4. Impacted cerumen of right ear H61.21        PLAN:   IDDM. FS are stable last A1C 7.0 on 1/10/19 Endocrinologist following ,    HTN. Continue  Lisinpril and metoprolol  Neuropathy.mainly  hands with reports of feet also . On lyrica and gabapentin.  CAD. HX  MI and stent.   Right ear cerumen - ear irrigation per orders-          Electronically signed by: Shawna Mesa, CNP

## 2021-06-19 NOTE — LETTER
Letter by Meagan Valdez MD at      Author: Meagan Valdez MD Service: -- Author Type: --    Filed:  Encounter Date: 8/13/2019 Status: (Other)         Patient: Cristal Preciado   MR Number: [id number]   YOB: 1950   Date of Visit: 8/13/2019     [facility]      Facility:    [facility] [[id number]]  Code Status: FULL CODE       Chief Complaint/Reason for Visit:  Chief Complaint   Patient presents with   ? H & P     Re admit to LT after hysterectomy.        HPI:   Cristal is a 69 y.o. female seen for readmission to long-term care at [facility]. She has been a resident here since October 2011. She does have multiple complex co morbidities. She is treated for hypertension and has insulin-dependent diabetes mellitus. She sees an endocrinologist. She has chronic neuropathy, chronic kidney disease and is treated with Cymbalta for depression and chronic pain. She is on gabapentin and lyrica for neuropathy pain. She has chronic weakness in her lower extremities and is wheelchair bound. She has chronic urinary incontinence. Hospitalized in April 2018 for chest pain. It is noted she has coronary artery disease having had PCI with stent placement in 2009. Her chest pain was reproducible on exam. Negative 3 sets of troponin. EKG showed no significant ischemic changes. Pharmacological stress was negative for inducible ischemia so no intervention was required. She has periodic follow up with cardiology.     She was admitted to the [facility] for planned hysterectomy. She had been experiencing brownish vaginal discharge, referred to gynecology with endometrial biopsy showing atypia. Her hospital info is excerpted below.             She returned to LTC on 8/6/19.    Today:  She has a bandage on her left heel, a blister was noted on 8/9/19. She is nonambulatory in wheelchair. Has chronic pain. No new concerns. No shortness of breath or chest pain.  No abdominal piain today. She will need follow up with gyn. No bleeding noted. BPs satisfactory. Accuchecks followed for DM, she is on metformin and insulin. No fever, cough or congestion.     Past Medical History:  Past Medical History:   Diagnosis Date   ? Acute cystitis with hematuria    ? Allergic rhinitis    ? CAD (coronary artery disease)    ? Carpal tunnel syndrome    ? Cataract    ? Cerebral vascular accident (H)    ? Chronic kidney disease    ? Debility    ? Depression    ? Diabetes mellitus, type II (H)    ? Diabetic nephropathy (H)    ? GERD (gastroesophageal reflux disease)    ? Glaucoma    ? Gout    ? History of pyelonephritis    ? HTN (hypertension)    ? Hyperlipidemia    ? IDDM (insulin dependent diabetes mellitus) (H)     Type 2   ? Lower extremity edema    ? Myocardial infarction (H)            Surgical History:  Past Surgical History:   Procedure Laterality Date   ? CARDIAC CATHETERIZATION     ? CATARACT EXTRACTION W/ INTRAOCULAR LENS IMPLANT Bilateral    ?  SECTION     ? CORONARY STENT PLACEMENT  2004    RCA   ? TOTAL ABDOMINAL HYSTERECTOMY W/ BILATERAL SALPINGOOPHORECTOMY  2019    Procedure: DaVinci Assisted Total Laparoscopic Hysterectomy, Removal Of Both Tubes And Ovaries; Surgeon: Linh Cardoso MD; Location:  OR         Family History:   Family History   Problem Relation Age of Onset   ? Hypertension Mother    ? Stroke Mother    ? Cancer Father    ? Glaucoma Father    ? Arthritis Brother    ? Diabetes Sister    ? Glaucoma Sister        Social History:    Social History     Socioeconomic History   ? Marital status: Single     Spouse name: Not on file   ? Number of children: Not on file   ? Years of education: Not on file   ? Highest education level: Not on file   Occupational History   ? Not on file   Social Needs   ? Financial resource strain: Not on file   ? Food insecurity:     Worry: Not on file     Inability: Not on file   ? Transportation needs:     Medical: Not  on file     Non-medical: Not on file   Tobacco Use   ? Smoking status: Former Smoker   ? Smokeless tobacco: Never Used   Substance and Sexual Activity   ? Alcohol use: No   ? Drug use: No   ? Sexual activity: Not on file   Lifestyle   ? Physical activity:     Days per week: Not on file     Minutes per session: Not on file   ? Stress: Not on file   Relationships   ? Social connections:     Talks on phone: Not on file     Gets together: Not on file     Attends Muslim service: Not on file     Active member of club or organization: Not on file     Attends meetings of clubs or organizations: Not on file     Relationship status: Not on file   ? Intimate partner violence:     Fear of current or ex partner: Not on file     Emotionally abused: Not on file     Physically abused: Not on file     Forced sexual activity: Not on file   Other Topics Concern   ? Not on file   Social History Narrative    10/13/2015 - The patient lives at [facility] for 5 years.       Medications:  Current Outpatient Medications   Medication Sig   ? acetaminophen (TYLENOL) 500 MG tablet Take 1,000 mg by mouth 3 (three) times a day .         ? aspirin 81 mg chewable tablet Chew 81 mg daily.   ? atorvastatin (LIPITOR) 80 MG tablet Take 80 mg by mouth bedtime.    ? carboxymethylcellulose (REFRESH PLUS) 0.5 % Dpet ophthalmic dropperette Administer 1 drop to both eyes 3 (three) times a day.   ? chlorthalidone (HYGROTEN) 25 MG tablet Take 25 mg by mouth daily.    ? dorzolamide-timolol (COSOPT) 22.3-6.8 mg/mL ophthalmic solution Administer 1 drop to both eyes 2 (two) times a day.    ? DULoxetine (CYMBALTA) 60 MG capsule Take 60 mg by mouth daily.   ? ferrous sulfate 325 (65 FE) MG tablet Take 1 tablet by mouth daily with breakfast.   ? folic acid (FOLVITE) 1 MG tablet Take 1 mg by mouth daily.   ? gabapentin (NEURONTIN) 300 MG capsule Take 600 mg by mouth 2 (two) times a day. 900 mg at bedtime         ? insulin regular hum U-500 conc, HumuLIN R,  (HUMULIN R CONC) 500 unit/mL CONCENTRATED injection Inject 50 Units under the skin 2 (two) times a day. And 60 units daily. Call if BG <70 or >350         ? ketoconazole (NIZORAL) 2 % shampoo Apply 1 application topically 2 (two) times a week Apply to damp skin, lather, leave on 5 minutes, and rinse. Apply on Wednesdays and Saturdays..         ? latanoprost (XALATAN) 0.005 % ophthalmic solution Administer 1 drop to both eyes bedtime.    ? liraglutide (VICTOZA) 0.6 mg/0.1 mL (18 mg/3 mL) PnIj injection Inject 1.8 mg under the skin daily. Call [phone] Dr. Washington if she develops nausea lasting >3 days.   ? lisinopril (PRINIVIL,ZESTRIL) 40 MG tablet Take 20 mg by mouth daily. Hold for SBP <110         ? loratadine (CLARITIN) 10 mg tablet Take 10 mg by mouth daily as needed for allergies.   ? LYRICA 75 mg capsule TAKE 1 CAP BY MOUTH THREE TIMES DAILY   ? metFORMIN (GLUCOPHAGE) 1000 MG tablet Take 1,000 mg by mouth daily with supper.   ? metoprolol tartrate (LOPRESSOR) 100 MG tablet Take 100 mg by mouth 2 (two) times a day.   ? mineral oil Drop Administer 3 drops into both ears daily as needed Every day shift, apply on Q-tip and rub into ears. .         ? nitroglycerin (NITROSTAT) 0.4 MG SL tablet Place 0.4 mg under the tongue every 5 (five) minutes as needed for chest pain. 1 tablet SL Q 5 minutes up to 3 doses. Call [phone] if chest pain persists.   ? olopatadine 0.2 % Drop Apply 1 drop to eye daily as needed.   ? oxyCODONE (ROXICODONE) 5 MG immediate release tablet Take 5 mg by mouth every 6 (six) hours as needed for pain.   ? ranitidine (ZANTAC) 150 MG tablet Take 150 mg by mouth 2 (two) times a day.          ? senna-docusate (SENNOSIDES-DOCUSATE SODIUM) 8.6-50 mg tablet Take 2 tablets by mouth as needed for constipation.          ? traMADol (ULTRAM) 50 mg tablet Take 1 tablet (50 mg total) by mouth 3 (three) times a day as needed for pain. For pain 6 or higher   ? triamcinolone (KENALOG) 0.1 % ointment Apply 1  application topically daily. Apply to legs in the morning  And to right ear prn BID       Allergies:  Allergies   Allergen Reactions   ? Nuts - Unspecified    ? Unable To Assess      Mountain Dew per [facility] medication review report        Review of Systems:  Pertinent items are noted in HPI.      Physical Exam:   General: Patient is alert female, no distress.   Vitals: /64, 143/78, 138/51, Temp 98.2, Pulse 80, RR 18, O2 sat 97% RA.  HEENT: Head is NCAT. Eyes show no injection or icterus. Nares negative. Oropharynx well hydrated.  Neck: Supple. No tenderness or adenopathy. No JVD.  Lungs: Clear bilaterally. No wheezes.  Cardiovascular: Regular rate and rhythm, normal S1, S2.  Back: No spinal or CVA tenderness.  Abdomen: Soft, no tenderness on exam. Bowel sounds present. No guarding rebound or rigidity.  : Deferred.  Extremities: Mild LE edema is noted.  Musculoskeletal: Deformities small joints hands.  Skin: Blister left heel.  Psych: Mood appears good.      Labs:  Lab Results   Component Value Date    WBC 4.3 04/24/2018    HGB 9.4 (L) 04/24/2018    HCT 28.6 (L) 04/24/2018     (H) 04/24/2018     04/26/2018     Results for orders placed or performed in visit on 01/10/19   Basic Metabolic Panel   Result Value Ref Range    Sodium 138 136 - 145 mmol/L    Potassium 4.6 3.5 - 5.0 mmol/L    Chloride 108 (H) 98 - 107 mmol/L    CO2 21 (L) 22 - 31 mmol/L    Anion Gap, Calculation 9 5 - 18 mmol/L    Glucose 136 (H) 70 - 125 mg/dL    Calcium 9.6 8.5 - 10.5 mg/dL    BUN 32 (H) 8 - 22 mg/dL    Creatinine 1.46 (H) 0.60 - 1.10 mg/dL    GFR MDRD Af Amer 43 (L) >60 mL/min/1.73m2    GFR MDRD Non Af Amer 36 (L) >60 mL/min/1.73m2       Assessment/Plan:  1. Hysterectomy. Surgery on 8/5/19 due to endometrial atypia noted on biopsy done for postmenopausal bleeding.  2. IDDM. On insulin, victoza, and metformin. Continue to follow accuchecks. She sees endocrinology.  3. HTN. BPs overall acceptable. On lisinopril,  metoprolol, chlorthalidone.    4. Neuropathy. Chronic pain controlled with gabapentin, lyrica and Cymbalta.  5. Hx of stroke. Wheelchair bound, non ambulatory.  6. Glaucoma. Visual impairment at baseline.  7. Left heel blister. Bandage and protect.   8. Hx CVA.    9. Depression. Continue cymbalta.  10. Code status is full code.         Electronically signed by: Meagan Valdez MD

## 2021-06-19 NOTE — LETTER
"Letter by Meagan Valdez MD at      Author: Meagan Valdez MD Service: -- Author Type: --    Filed:  Encounter Date: 3/28/2019 Status: (Other)         Patient: Cristal Preciado   MR Number: [id number]   YOB: 1950   Date of Visit: 3/28/2019     [facility]    Facility:   [facility] [[id number]]   Code Status: FULL CODE       Chief Complaint   Patient presents with   ? Review Of Multiple Medical Conditions     [facility] 3/28/19.     3  HPI:   Cristal Preciado is a 69-year-old female seen for routine physician follow-up in the long-term care facility [facility]. She has been a resident here since October 2011. She does have multiple complex co morbidities. She is treated for hypertension and has insulin-dependent diabetes mellitus. She sees an endocrinologist. She has chronic neuropathy, chronic kidney disease and is treated with Cymbalta for depression and chronic pain. She is on gabapentin and lyrica for neuropathy pain. She is on meds for depression. She has chronic weakness in her lower extremities and is wheelchair bound. She has chronic urinary incontinence. Most recent hospitalization was April 2018 for chest pain. It is noted she has coronary artery disease having had PCI with stent placement in 2009. Her chest pain was reproducible on exam. Negative 3 sets of troponin. EKG showed no significant ischemic changes. Pharmacological stress was negative for inducible ischemia so no intervention was required. She has periodic follow up with cardiology.     Today:  She sees outside podiatry on an ongoing basis and recently was felt to have pressure blister of right heel. There was never an open area and never a raised blister per nursing. She floats her heels and rarely wears shoes, she is non ambulatory. Today she went for follow up and nail trim and the referral form came back with \"right heel healed pressure blister with residual " "hyperkeratotic skin\". Per nursling it looks the same as it has, still with purple area under the skin, no pain and no other concerns. It is doubtful of pressure etiology but will continue to off load for good practice and prevention. Otherwise, she will be seeing endocrinologist for her diabetes. She is managed with insulin. She has chronic pain. Today said her shoulders are not bothering her as they had been previously. She has not been ill recently with cough cold or congestion. Pharmacy recently made a recommendation to reduce gabapentin as she is on both lyrica and gabapentin with reduced creatinine clearance. Earlier this week I reduced gabapentin from 900 three times a day to 600 three times a day. Will reassess pain needs as needed. She is also on Lyrica 75 two times a day.      Past Medical History:  Past Medical History:   Diagnosis Date   ? Allergic rhinitis    ? CAD (coronary artery disease)    ? Carpal tunnel syndrome    ? Cataract    ? Cerebral vascular accident (H)    ? Chronic kidney disease    ? Debility    ? Depression    ? Diabetes mellitus, type II (H)    ? Diabetic nephropathy (H)    ? Diabetic neuropathy (H)    ? GERD (gastroesophageal reflux disease)    ? Glaucoma    ? Gout    ? History of pyelonephritis    ? HTN (hypertension)    ? Hyperlipidemia    ? IDDM (insulin dependent diabetes mellitus) (H)     Type 2   ? Lower extremity edema    ? Myocardial infarction        Medications:  Current Outpatient Medications   Medication Sig   ? acetaminophen (TYLENOL) 500 MG tablet Take 1,000 mg by mouth 3 (three) times a day .         ? aspirin 81 mg chewable tablet Chew 81 mg daily.   ? atorvastatin (LIPITOR) 80 MG tablet Take 80 mg by mouth bedtime.    ? calcium carbonate-vit D3-min 600 mg calcium- 400 unit Tab Take 1 tablet by mouth 2 (two) times a day.    ? chlorthalidone (HYGROTEN) 25 MG tablet Take 25 mg by mouth daily.    ? dorzolamide-timolol (COSOPT) 22.3-6.8 mg/mL ophthalmic solution Administer " 1 drop to both eyes 2 (two) times a day.    ? DULoxetine (CYMBALTA) 60 MG capsule Take 60 mg by mouth daily.   ? ferrous sulfate 325 (65 FE) MG tablet Take 1 tablet by mouth daily with breakfast.   ? folic acid (FOLVITE) 1 MG tablet Take 1 mg by mouth daily.   ? gabapentin (NEURONTIN) 300 MG capsule Take 600 mg by mouth 3 (three) times a day.          ? insulin regular hum U-500 conc, HumuLIN R, (HUMULIN R CONC) 500 unit/mL CONCENTRATED injection Inject 60 Units under the skin 3 (three) times a day Call if readings <70 or >350 .   ? ketoconazole (NIZORAL) 2 % shampoo Apply 1 application topically 2 (two) times a week Apply to damp skin, lather, leave on 5 minutes, and rinse. Apply on Wednesdays and Saturdays..         ? latanoprost (XALATAN) 0.005 % ophthalmic solution Administer 1 drop to both eyes bedtime.    ? liraglutide (VICTOZA) 0.6 mg/0.1 mL (18 mg/3 mL) PnIj injection Inject 1.8 mg under the skin daily. Call [phone] Dr. Washington if she develops nausea lasting >3 days.   ? lisinopril (PRINIVIL,ZESTRIL) 40 MG tablet Take 40 mg by mouth daily. Hold for SBP <110   ? loratadine (CLARITIN) 10 mg tablet Take 10 mg by mouth daily as needed for allergies.   ? LYRICA 75 mg capsule TAKE 1 CAP BY MOUTH THREE TIMES DAILY   ? metFORMIN (GLUCOPHAGE-XR) 500 MG 24 hr tablet Take 1,000 mg by mouth daily with breakfast.   ? metoprolol tartrate (LOPRESSOR) 100 MG tablet Take 100 mg by mouth 2 (two) times a day.   ? mineral oil Drop Administer 3 drops into both ears daily as needed Every day shift, apply on Q-tip and rub into ears. .         ? nitroglycerin (NITROSTAT) 0.4 MG SL tablet Place 0.4 mg under the tongue every 5 (five) minutes as needed for chest pain. 1 tablet SL Q 5 minutes up to 3 doses. Call [phone] if chest pain persists.   ? olopatadine 0.2 % Drop Apply 1 drop to eye daily as needed.   ? ranitidine (ZANTAC) 150 MG tablet Take 150 mg by mouth once daily.    ? senna-docusate (SENNOSIDES-DOCUSATE SODIUM) 8.6-50 mg  tablet Take 1 tablet by mouth daily as needed for constipation.   ? traMADol (ULTRAM) 50 mg tablet Take 1 tablet (50 mg total) by mouth 3 (three) times a day as needed for pain. For pain 6 or higher   ? triamcinolone (KENALOG) 0.1 % ointment Apply 1 application topically daily. Apply to legs in the morning  And to right ear prn BID       Physical Exam:   General: Patient is alert female, no distress.   Vitals: /78, 119/62, Temp 98.3, Pulse 88., RR 18,O2 sat 94% RA.  HEENT: Head is NCAT. Eyes show no injection or icterus. Nares negative. Oropharynx well hydrated.  Neck: Supple. No tenderness or adenopathy. No JVD.  Lungs: Clear bilaterally. No wheezes.  Cardiovascular: Regular rate and rhythm, normal S1, S2.  Back: No spinal or CVA tenderness.  Abdomen: Soft, no tenderness on exam. Bowel sounds present. No guarding rebound or rigidity.  Extremities: LE edema is noted.  Skin: Small flat purple area right medial posterior heel, no redness or drainage.   Musculoskeletal: Degen changes.  Psych: Mood appears good.      Labs:  Lab Results   Component Value Date    WBC 4.3 04/24/2018    HGB 9.4 (L) 04/24/2018    HCT 28.6 (L) 04/24/2018     (H) 04/24/2018     04/26/2018     Results for orders placed or performed in visit on 01/10/19   Basic Metabolic Panel   Result Value Ref Range    Sodium 138 136 - 145 mmol/L    Potassium 4.6 3.5 - 5.0 mmol/L    Chloride 108 (H) 98 - 107 mmol/L    CO2 21 (L) 22 - 31 mmol/L    Anion Gap, Calculation 9 5 - 18 mmol/L    Glucose 136 (H) 70 - 125 mg/dL    Calcium 9.6 8.5 - 10.5 mg/dL    BUN 32 (H) 8 - 22 mg/dL    Creatinine 1.46 (H) 0.60 - 1.10 mg/dL    GFR MDRD Af Amer 43 (L) >60 mL/min/1.73m2    GFR MDRD Non Af Amer 36 (L) >60 mL/min/1.73m2       Assessment/Plan:  1. IDDM. Diabetes. Continue to follow accuchecks. She will see endocrinology in April.  2. HTN. BPs overall acceptable, On lisinopril, metoprolol, chlorthalidone.    3. Neuropathy. Chronic pain controlled with  gabapentin (dose recently reduced due to impaired kidney function per pharmacy), Cymbalta.  4. Hx of stroke. Wheelchair bound, non ambulatory.  5. Right heel skin issue. Unknown etiology. Stable. Sees podiatry, continue current cares and attention to pressure avoidance.  6. Glaucoma. Visual impairment at baseline.          Electronically signed by: Meagan Valdez MD

## 2021-06-19 NOTE — LETTER
Letter by Shawna Mesa CNP at      Author: Shawna Mesa CNP Service: -- Author Type: --    Filed:  Encounter Date: 9/11/2019 Status: (Other)         Patient: Cristal Preciado   MR Number: [id number]   YOB: 1950   Date of Visit: 9/11/2019     [facility]    Facility:   [facility] [place] [facility] [[id number]]   Code Status: FULL CODE      CHIEF COMPLAINT/REASON FOR VISIT:  Chief Complaint   Patient presents with   ? FVP Care Coordination - Regulatory       HISTORY:      HPI: Cristal is a 69 y.o. female residing  in the long-term care facility [facility]. She has been a resident here since October 2011. She does have multiple complex co morbidities. She is treated for hypertension and has insulin-dependent diabetes mellitus. She has a endocrinologist who is following her blood sugars. Nursing is sending weekly checks to her endocrinologist.  She has chronic neuropathy, chronic kidney disease and is treated with Cymbalta for depression. She is on gabapentin and lyrica for neuropathy pain. She is on meds for depression.  Does require a lift for transfers. She has chronic weakness in her lower extremities.      Today she is seen in her room for reports of abdominal pain x 1 week following a recent laparoscopic hysterectomy.  She reports she has been having right lower abdominal pain since her surgery for approximately 1 week.  She reports she is passing lots of flatus and having regular bowel movements.  Her abdomen was more distended than normal and she reports the pain radiates up to underneath her right shoulder and low back.  She did have some discomfort with palpation to the right lower abdomen.  Her lap sites  are all clean dry and intact with no signs or symptoms of infection.  It appears some of her pain may be related to gas pain and she was placed on simethicone 4 times daily.  I will also check an abdominal x-ray. along with labs and a U/A.  She also  reported she has been having pain in both her calves and a venous ultrasound was ordered.  She denies chest pain or shortness of breath.  She denies cough or congestion.   She is  non ambulatory, She is on  gabapentin and lyrica for neuropathy.. Her BS appear to be more controlled and she is followed by endocrinology.      Past Medical History:   Diagnosis Date   ? Acute cystitis with hematuria    ? Allergic rhinitis    ? CAD (coronary artery disease)    ? Carpal tunnel syndrome    ? Cataract    ? Cerebral vascular accident (H)    ? Chronic kidney disease    ? Debility    ? Depression    ? Diabetes mellitus, type II (H)    ? Diabetic nephropathy (H)    ? GERD (gastroesophageal reflux disease)    ? Glaucoma    ? Gout    ? History of pyelonephritis    ? HTN (hypertension)    ? Hyperlipidemia    ? IDDM (insulin dependent diabetes mellitus) (H)     Type 2   ? Lower extremity edema    ? Myocardial infarction (H)              Family History   Problem Relation Age of Onset   ? Hypertension Mother    ? Stroke Mother    ? Cancer Father    ? Glaucoma Father    ? Arthritis Brother    ? Diabetes Sister    ? Glaucoma Sister      Social History     Socioeconomic History   ? Marital status: Single     Spouse name: Not on file   ? Number of children: Not on file   ? Years of education: Not on file   ? Highest education level: Not on file   Occupational History   ? Not on file   Social Needs   ? Financial resource strain: Not on file   ? Food insecurity:     Worry: Not on file     Inability: Not on file   ? Transportation needs:     Medical: Not on file     Non-medical: Not on file   Tobacco Use   ? Smoking status: Former Smoker   ? Smokeless tobacco: Never Used   Substance and Sexual Activity   ? Alcohol use: No   ? Drug use: No   ? Sexual activity: Not on file   Lifestyle   ? Physical activity:     Days per week: Not on file     Minutes per session: Not on file   ? Stress: Not on file   Relationships   ? Social connections:      Talks on phone: Not on file     Gets together: Not on file     Attends [employer] service: Not on file     Active member of club or organization: Not on file     Attends meetings of clubs or organizations: Not on file     Relationship status: Not on file   ? Intimate partner violence:     Fear of current or ex partner: Not on file     Emotionally abused: Not on file     Physically abused: Not on file     Forced sexual activity: Not on file   Other Topics Concern   ? Not on file   Social History Narrative    10/13/2015 - The patient lives at [facility] for 5 years.         Review of Systems  Constitutional: Positive for activity change. Negative for appetite change, chills, fatigue and fever.        Lift for transfers.    HENT: Negative for congestion and sore throat.    Respiratory: Negative for shortness of breath and wheezing.    Cardiovascular: Positive for leg swelling. Negative for chest pain.        Compression stockings   Gastrointestinal: Negative for abdominal distention, abdominal pain, constipation, diarrhea and nausea.   Genitourinary: Negative for dysuria.   Musculoskeletal: Positive for arthralgias. Negative for myalgias.   Skin: Negative for color change, rash and wound.   Neurological: Positive for numbness. Negative for dizziness and weakness.        Severe neuropathy bilateral hands   Psychiatric/Behavioral: Negative for agitation, behavioral problems and sleep disturbance.   .  Vitals:    09/11/19 0839   BP: 126/72   Pulse: 80   Resp: 18   Temp: 97.6  F (36.4  C)   SpO2: 96%   Weight: 214 lb 8 oz (97.3 kg)       Physical Exam   Abdominal: She exhibits distension. There is tenderness.   Tenderness right lower quadrant   Skin:   Multiple lap sites clean dry and intact       Constitutional: She is oriented to person, place, and time. She appears well-developed and well-nourished.   Pleasant woman in no acute distress.   HENT:   Head: Normocephalic.   Eyes: Conjunctivae are normal.   Neck: Normal  range of motion.   Cardiovascular: Normal rate, regular rhythm and normal heart sounds.   No murmur heard.  Pulmonary/Chest: Breath sounds normal. No respiratory distress. She has no wheezes. She has no rales.   Abdominal: Soft. Bowel sounds are normal. She exhibits no distension. There is no tenderness.   Musculoskeletal: She exhibits edema.   Wearing Compression socks   Neurological: She is alert and oriented to person, place, and time.   Neuropathy bilateral hands.   Skin: Skin is warm.   Psychiatric: She has a normal mood and affect. Her behavior is normal.   LABS:   No results found for this or any previous visit (from the past 240 hour(s)).  Case Management:  I have reviewed the facility/SNF care plan/MDS which was done 8/21/19, including the falls risk, nutrition and pain screening. I also reviewed the current immunizations, and preventive care.. Future cancer screening is not clinically indicated secondary to age/goals of care.   Patient's desire to return to the community is present, but is not able due to care needs .    Information reviewed:  Medications, vital signs, orders, and nursing notes.  Current Outpatient Medications   Medication Sig   ? acetaminophen (TYLENOL) 500 MG tablet Take 1,000 mg by mouth 3 (three) times a day .         ? aspirin 81 mg chewable tablet Chew 81 mg daily.   ? atorvastatin (LIPITOR) 80 MG tablet Take 80 mg by mouth bedtime.    ? carboxymethylcellulose (REFRESH PLUS) 0.5 % Dpet ophthalmic dropperette Administer 1 drop to both eyes 3 (three) times a day.   ? chlorthalidone (HYGROTEN) 25 MG tablet Take 25 mg by mouth daily.    ? dorzolamide-timolol (COSOPT) 22.3-6.8 mg/mL ophthalmic solution Administer 1 drop to both eyes 2 (two) times a day.    ? DULoxetine (CYMBALTA) 60 MG capsule Take 60 mg by mouth daily.   ? ferrous sulfate 325 (65 FE) MG tablet Take 1 tablet by mouth daily with breakfast.   ? folic acid (FOLVITE) 1 MG tablet Take 1 mg by mouth daily.   ? gabapentin  (NEURONTIN) 300 MG capsule Take 600 mg by mouth 2 (two) times a day. 900 mg at bedtime         ? insulin glargine U-300 conc (TOUJEO MAX U-300 SOLOSTAR) 300 unit/mL (3 mL) InPn Inject 90 Units under the skin daily. 2 injections of 45 units from pen   ? ketoconazole (NIZORAL) 2 % shampoo Apply 1 application topically 2 (two) times a week Apply to damp skin, lather, leave on 5 minutes, and rinse. Apply on Wednesdays and Saturdays..         ? latanoprost (XALATAN) 0.005 % ophthalmic solution Administer 1 drop to both eyes bedtime.    ? liraglutide (VICTOZA) 0.6 mg/0.1 mL (18 mg/3 mL) PnIj injection Inject 1.8 mg under the skin daily. Call [phone] Dr. Washington if she develops nausea lasting >3 days.   ? lisinopril (PRINIVIL,ZESTRIL) 40 MG tablet Take 20 mg by mouth daily. Hold for SBP <110         ? loratadine (CLARITIN) 10 mg tablet Take 10 mg by mouth daily as needed for allergies.   ? LYRICA 75 mg capsule TAKE 1 CAP BY MOUTH THREE TIMES DAILY   ? metFORMIN (GLUCOPHAGE) 1000 MG tablet Take 1,000 mg by mouth daily with supper.   ? metoprolol tartrate (LOPRESSOR) 100 MG tablet Take 100 mg by mouth 2 (two) times a day.   ? mineral oil Drop Administer 3 drops into both ears daily as needed Every day shift, apply on Q-tip and rub into ears. .         ? nitroglycerin (NITROSTAT) 0.4 MG SL tablet Place 0.4 mg under the tongue every 5 (five) minutes as needed for chest pain. 1 tablet SL Q 5 minutes up to 3 doses. Call 165 if chest pain persists.   ? olopatadine 0.2 % Drop Apply 1 drop to eye daily as needed.   ? ranitidine (ZANTAC) 150 MG tablet Take 150 mg by mouth 2 (two) times a day.          ? senna-docusate (SENNOSIDES-DOCUSATE SODIUM) 8.6-50 mg tablet Take 2 tablets by mouth as needed for constipation.          ? simethicone (MYLICON) 80 MG chewable tablet Chew 80 mg 4 (four) times a day. After meals and at HS   ? traMADol (ULTRAM) 50 mg tablet Take 1 tablet (50 mg total) by mouth 3 (three) times a day as needed for  pain. For pain 6 or higher   ? triamcinolone (KENALOG) 0.1 % ointment Apply 1 application topically daily. Apply to legs in the morning  And to right ear prn BID     ASSESSMENT:      ICD-10-CM    1. Right lower quadrant abdominal pain R10.31    2. Debility R53.81    3. Essential hypertension I10    4. Pain management R52        PLAN:    Bilateral calf pain - venous doppler     Abdominal pain- on scheduled ES Tylenol, Tramadol PRN Abdominal x-ray, simethicone four times a day     IDDM. FS are stable last A1C 6.6 on 7/23/19 Endocrinologist following , Continue current diabetic medications as above.      HTN. Continue  Lisinpril and metoprolol    Neuropathy.mainly  hands with reports of feet also . On lyrica and gabapentin.    CAD. HX  MI and stent.     S/p Hysterectomy   lap sites intact, monitor for S/S infection      Fatigue- check labs CBC, BMP      Electronically signed by: Shawna Mesa, CNP

## 2021-06-20 NOTE — LETTER
Letter by Shawna Mesa CNP at      Author: Shawna Mesa CNP Service: -- Author Type: --    Filed:  Encounter Date: 2/12/2020 Status: (Other)         Patient: Cristal Preciado   MR Number: [id number]   YOB: 1950   Date of Visit: 2/12/2020     [facility]    Facility:   [facility] [[id number]]   Code Status: FULL CODE      CHIEF COMPLAINT/REASON FOR VISIT:  Chief Complaint   Patient presents with   ? FVP Care Coordination - Regulatory       HISTORY:      HPI: Cristal is a 69 y.o. female residing  in the long-term care facility [facility]. She has been a resident here since October 2011. She does have multiple complex co morbidities. She is treated for hypertension and has insulin-dependent diabetes mellitus. She has a endocrinologist who is following her blood sugars. Nursing is sending weekly checks to her endocrinologist.  She has chronic neuropathy, chronic kidney disease and is treated with Cymbalta for depression. She is on gabapentin and lyrica for neuropathy pain. She is on meds for depression.  Does require a lift for transfers. She has chronic weakness in her lower extremities.      Today she is seen for a routine regulatory visit visit to review multiple medical issues.    She is with the recent hysterectomy October 2019. She reports she is passing flatus and having regular bowel movements.    She denies chest pain or shortness of breath.  She denies cough or congestion.   She is  non ambulatory, She is on  gabapentin and lyrica for neuropathy.. Her BS are being controlled by her endocrinologist and per staff all of her fingersticks were sent on to 11/20/2020 and they are to be sent every 3 months.  She denies congestion.  Her weights have been stable.  She did just have a recent mammogram and a colonoscopy and tells me she is not due for another colonoscopy for 5 more years.  Per staff she is on a  restorative program and she gets  daily exercises Monday through Friday.  Her blood sugar this morning was 133.        Past Medical History:   Diagnosis Date   ? Acute cystitis with hematuria    ? Allergic rhinitis    ? CAD (coronary artery disease)    ? Carpal tunnel syndrome    ? Cataract    ? Cerebral vascular accident (H)    ? Chronic kidney disease    ? Debility    ? Depression    ? Diabetes mellitus, type II (H)    ? Diabetic nephropathy (H)    ? GERD (gastroesophageal reflux disease)    ? Glaucoma    ? Gout    ? History of pyelonephritis    ? HTN (hypertension)    ? Hyperlipidemia    ? IDDM (insulin dependent diabetes mellitus) (H)     Type 2   ? Lower extremity edema    ? Myocardial infarction (H)              Family History   Problem Relation Age of Onset   ? Hypertension Mother    ? Stroke Mother    ? Cancer Father    ? Glaucoma Father    ? Arthritis Brother    ? Diabetes Sister    ? Glaucoma Sister      Social History     Socioeconomic History   ? Marital status: Single     Spouse name: Not on file   ? Number of children: Not on file   ? Years of education: Not on file   ? Highest education level: Not on file   Occupational History   ? Not on file   Social Needs   ? Financial resource strain: Not on file   ? Food insecurity:     Worry: Not on file     Inability: Not on file   ? Transportation needs:     Medical: Not on file     Non-medical: Not on file   Tobacco Use   ? Smoking status: Former Smoker   ? Smokeless tobacco: Never Used   Substance and Sexual Activity   ? Alcohol use: No   ? Drug use: No   ? Sexual activity: Not on file   Lifestyle   ? Physical activity:     Days per week: Not on file     Minutes per session: Not on file   ? Stress: Not on file   Relationships   ? Social connections:     Talks on phone: Not on file     Gets together: Not on file     Attends [employer] service: Not on file     Active member of club or organization: Not on file     Attends meetings of clubs or organizations: Not on file     Relationship status:  Not on file   ? Intimate partner violence:     Fear of current or ex partner: Not on file     Emotionally abused: Not on file     Physically abused: Not on file     Forced sexual activity: Not on file   Other Topics Concern   ? Not on file   Social History Narrative    10/13/2015 - The patient lives at [facility] for [facility] years.         Review of Systems  Constitutional: Positive for activity change. Negative for appetite change, chills, fatigue and fever.        Lift for transfers.    HENT: Negative for congestion and sore throat.    Respiratory: Negative for shortness of breath and wheezing.    Cardiovascular: Positive for leg swelling. Negative for chest pain.        Compression stockings   Gastrointestinal: Negative for abdominal distention, abdominal pain, constipation, diarrhea and nausea.   Genitourinary: Negative for dysuria.   Musculoskeletal: Positive for arthralgias. Negative for myalgias.   Skin: Negative for color change, rash and wound.   Neurological: Positive for numbness. Negative for dizziness and weakness.        Severe neuropathy bilateral hands   Psychiatric/Behavioral: Negative for agitation, behavioral problems and sleep disturbance.   Vitals:    02/12/20 1101   BP: 155/76   Pulse: 74   Resp: 18   Temp: 98  F (36.7  C)   SpO2: 94%   Weight: 202 lb 8 oz (91.9 kg)       Physical Exam    Abdominal: She exhibits distension.  Abdomen is soft  Skin:   Multiple lap sites on abdomen completely healed       Constitutional: She is oriented to person, place, and time. She appears well-developed and well-nourished.   Pleasant woman in no acute distress.   HENT:   Head: Normocephalic.   Eyes: Conjunctivae are normal.   Neck: Normal range of motion.   Cardiovascular: Normal rate, regular rhythm and normal heart sounds.   No murmur heard.  Pulmonary/Chest: Breath sounds normal. No respiratory distress. She has no wheezes. She has no rales.   Abdominal: Soft. Bowel sounds are normal. She exhibits no  distension. There is no tenderness.   Musculoskeletal: She exhibits edema.   Wearing Compression socks   Neurological: She is alert and oriented to person, place, and time.   Neuropathy bilateral hands.   Skin: Skin is warm.   Psychiatric: She has a normal mood and affect. Her behavior is normal  LABS:   No results found for this or any previous visit (from the past 240 hour(s)).  Case Management:  I have reviewed the facility/SNF care plan/MDS which was done 2/10/20 including the falls risk, nutrition and pain screening. I also reviewed the current immunizations, and preventive care..  Staff to follow-up with family regarding colonoscopy and mammogram.  Patient's desire to return to the community is not present.    Information reviewed:  Medications, vital signs, orders, and nursing notes.    ASSESSMENT:      ICD-10-CM    1. Diabetic peripheral neuropathy associated with type 2 diabetes mellitus (H) E11.42    2. Depression, unspecified depression type F32.9    3. Essential hypertension I10        PLAN:       IDDM. FS are stable last A1C 6.6 on 7/23/19 Endocrinologist following , Continue current diabetic medications as above.    She is followed by endocrinology      HTN. Continue  Lisinpril and metoprolol     Neuropathy.mainly  hands with reports of feet also . On lyrica and gabapentin.     CAD. HX  MI and stent.      S/p Hysterectomy in October   lap sites healed    Depression on Cymbalta       Electronically signed by: Shawna Mesa CNP

## 2021-06-20 NOTE — PROGRESS NOTES
[facility]    Facility:   [facility] [[id number]]   Code Status: FULL CODE      CHIEF COMPLAINT/REASON FOR VISIT:  Chief Complaint   Patient presents with     Review Of Multiple Medical Conditions       HISTORY:      HPI: Cristal is a 68 y.o. female  being seen for routine visit in the long-term care facility [facility]. She has been a resident here since October 2011. She does have multiple complex co morbidities. She is treated for hypertension and has insulin-dependent diabetes mellitus. She has a endocrinologist and had a recent appointment. Nursing is sending weekly checks to her endocrinologist and they have been making adjustments.   She has chronic neuropathy, chronic kidney disease and is treated with Cymbalta for depression. She is on gabapentin and lyrica for neuropathy pain. She is on meds for depression.  Does require a lift for transfers. She has chronic weakness in her lower extremities.     She was seen in her room this morning. She is pleasant and compliant with exam.  She denies chest pain or shortness of breath.  She denies cough or congestion. Her BS have been elevated and endocrinology is following her.  She is  non ambulatory, has impaired mobility of both hands but does report relief with gabapentin and lyrica. Her last AIC was 8.3 on 9/7/18. Which is up from her last one. Her main concern is wanting a small skin tag removed on her right cheek. SHe does follow up with a dermatologist and I will refer this out. She does not have pain with the skin tag.  Today her urine was noted to be foul smelling. I ordered urine tests which later came back with no growth.     Past Medical History:   Diagnosis Date     Allergic rhinitis      CAD (coronary artery disease)      Carpal tunnel syndrome      Cataract      Cerebral vascular accident (H)      Chronic kidney disease      Debility      Depression      Diabetes mellitus, type II (H)       Diabetic nephropathy (H)      Diabetic neuropathy (H)      GERD (gastroesophageal reflux disease)      Glaucoma      Gout      History of pyelonephritis      HTN (hypertension)      Hyperlipidemia      IDDM (insulin dependent diabetes mellitus) (H)     Type 2     Lower extremity edema      Myocardial infarction              Family History   Problem Relation Age of Onset     Hypertension Mother      Stroke Mother      Cancer Father      Arthritis Brother      Social History     Social History     Marital status: Single     Spouse name: N/A     Number of children: N/A     Years of education: N/A     Social History Main Topics     Smoking status: Former Smoker     Smokeless tobacco: Not on file     Alcohol use No     Drug use: No     Sexual activity: Not on file     Other Topics Concern     Not on file     Social History Narrative    10/13/2015 - The patient lives at [facility] for [facility].         Review of Systems   Constitutional: Positive for activity change. Negative for appetite change, chills, fatigue and fever.        Lift for transfers.    HENT: Negative for congestion and sore throat.    Respiratory: Negative for shortness of breath and wheezing.    Cardiovascular: Positive for leg swelling. Negative for chest pain.        Compression stockings   Gastrointestinal: Negative for abdominal distention, abdominal pain, constipation, diarrhea and nausea.   Genitourinary: Negative for dysuria.   Musculoskeletal: Positive for arthralgias. Negative for myalgias.   Skin: Negative for color change, rash and wound.        PU buttocks   Neurological: Positive for numbness. Negative for dizziness and weakness.        Severe neuropathy bilateral hands   Psychiatric/Behavioral: Negative for agitation, behavioral problems and sleep disturbance.       .  Vitals:    09/05/18 1536   BP: 108/66   Pulse: 81   Resp: 18   Temp: 97.8  F (36.6  C)   SpO2: 95%       Physical Exam   Constitutional: She is oriented to person, place,  and time. She appears well-developed and well-nourished.   Pleasant woman in no acute distress.   HENT:   Head: Normocephalic.   Eyes: Conjunctivae are normal.   Neck: Normal range of motion.   Cardiovascular: Normal rate, regular rhythm and normal heart sounds.    No murmur heard.  Pulmonary/Chest: Breath sounds normal. No respiratory distress. She has no wheezes. She has no rales.   Abdominal: Soft. Bowel sounds are normal. She exhibits no distension. There is no tenderness.   Musculoskeletal: She exhibits edema.   Wearing Compression socks   Neurological: She is alert and oriented to person, place, and time.   Neuropathy bilateral hands.   Skin: Skin is warm.   Stage 2 pressure ulcers left and right buttock, followed by facility wound nurse.    Psychiatric: She has a normal mood and affect. Her behavior is normal.         LABS:    Recent Results (from the past 240 hour(s))   Urinalysis   Result Value Ref Range    Color, UA Straw Colorless, Yellow, Straw, Light Yellow    Clarity, UA Turbid (!) Clear    Glucose, UA 50 mg/dL (!) Negative    Bilirubin, UA Negative Negative    Ketones, UA Negative Negative    Specific Gravity, UA 1.010 1.001 - 1.030    Blood, UA Trace (!) Negative    pH, UA 6.5 4.5 - 8.0    Protein, UA Negative Negative mg/dL    Urobilinogen, UA <2.0 E.U./dL <2.0 E.U./dL, 2.0 E.U./dL    Nitrite, UA Negative Negative    Leukocytes, UA Large (!) Negative    Bacteria, UA Many (!) None Seen hpf    RBC, UA 0-2 None Seen, 0-2 hpf    WBC, UA 0-5 None Seen, 0-5 hpf    Squam Epithel, UA 10-25 (!) None Seen, 0-5 lpf    Amorphous, UA Moderate (!) None Seen    Mucus, UA Few (!) None Seen lpf   Culture, Urine   Result Value Ref Range    Culture No Growth    Glycosylated Hemoglobin A1C   Result Value Ref Range    Hemoglobin A1c 8.3 (H) 4.2 - 6.1 %   Aic 9/7/18 8.3  AiC 4/24/18 was 7.7 which is down from 7.9  9/13/17  BMP  BUN 28  Creatinine 1.20  GFR 55    Current Outpatient Prescriptions   Medication Sig      acetaminophen (TYLENOL) 500 MG tablet Take 1,000 mg by mouth daily as needed. Give at bedtime; also has additional pm orders. For hand pain. Don't exceed 3000mg/24hrs     aspirin 81 mg chewable tablet Chew 81 mg daily.     calcium carbonate-vit D3-min 600 mg calcium- 400 unit Tab Take 1 tablet by mouth 2 (two) times a day.      chlorthalidone (HYGROTEN) 25 MG tablet Take 25 mg by mouth daily.      atorvastatin (LIPITOR) 80 MG tablet Take 80 mg by mouth bedtime.      dorzolamide-timolol (COSOPT) 22.3-6.8 mg/mL ophthalmic solution Administer 1 drop to both eyes 2 (two) times a day.      DULoxetine (CYMBALTA) 60 MG capsule Take 60 mg by mouth daily.     ferrous sulfate 325 (65 FE) MG tablet Take 1 tablet by mouth daily with breakfast.     folic acid (FOLVITE) 1 MG tablet Take 1 mg by mouth daily.     gabapentin (NEURONTIN) 300 MG capsule Take 900 mg by mouth 3 (three) times a day.     insulin aspart U-100 (NOVOLOG) 100 unit/mL injection pen Inject 76-96 Units under the skin 3 (three) times a day before meals. Please see MAR     insulin aspart U-100 (NOVOLOG) 100 unit/mL injection Inject under the skin see administration instructions. Give three times a day before meals using sliding scale. 201-249= 2 units, 250-299= 4 units, 300-349= 6 units, 350-399= 8 units, 400-450= 10 units, and 451+ = 12 units. Goal is to eventually get rid of sliding scale.     ketoconazole (NIZORAL) 2 % shampoo Apply 1 application topically 2 (two) times a week. Apply to damp skin, lather, leave on 5 minutes, and rinse. Apply on Wednesdays and Sundays.     LANTUS SOLOSTAR U-100 INSULIN 100 unit/mL (3 mL) pen Inject 75 Units under the skin 2 (two) times a day. 11.65 Type 2 with hyperglycemia  Contact provider if insulin prescribed is not the preferred insulin per insurance. (Patient taking differently: Inject 100 Units under the skin 2 (two) times a day. 11.65 Type 2 with hyperglycemia  Contact provider if insulin prescribed is not the preferred  insulin per insurance.)     latanoprost (XALATAN) 0.005 % ophthalmic solution Administer 1 drop to both eyes bedtime.      liraglutide (VICTOZA) 0.6 mg/0.1 mL (18 mg/3 mL) PnIj injection Inject 1.8 mg under the skin daily. Call [phone] Dr. Washington if she develops nausea lasting >3 days.     lisinopril (PRINIVIL,ZESTRIL) 40 MG tablet Take 40 mg by mouth daily. Hold for SBP <110     loratadine (CLARITIN) 10 mg tablet Take 10 mg by mouth daily as needed for allergies.     metoprolol tartrate (LOPRESSOR) 100 MG tablet Take 100 mg by mouth 2 (two) times a day.     mineral oil Drop Administer 4 drops into both ears 2 (two) times a day as needed. Every day shift, apply on Q-tip and rub into ears.      nitroglycerin (NITROSTAT) 0.4 MG SL tablet Place 0.4 mg under the tongue every 5 (five) minutes as needed for chest pain. 1 tablet SL Q 5 minutes up to 3 doses. Call [phone] if chest pain persists.     olopatadine 0.2 % Drop Apply 1 drop to eye daily as needed.     pimecrolimus (ELIDEL) 1 % cream Apply 1 application topically 2 (two) times a day as needed. Apply to rash on both ears     pregabalin (LYRICA) 75 MG capsule Take 75 mg by mouth 3 (three) times a day.     ranitidine (ZANTAC) 150 MG tablet Take 150 mg by mouth once daily.      senna-docusate (SENNOSIDES-DOCUSATE SODIUM) 8.6-50 mg tablet Take 1 tablet by mouth daily as needed for constipation.     traMADol (ULTRAM) 50 mg tablet Take 1 tablet (50 mg total) by mouth 3 (three) times a day as needed for pain. For pain 6 or higher     triamcinolone (KENALOG) 0.1 % ointment Apply 1 application topically daily. Apply to legs in the morning  And to right ear prn BID       ASSESSMENT:      ICD-10-CM    1. Type 2 diabetes mellitus with hyperglycemia, with long-term current use of insulin (H) E11.65     Z79.4    2. Depression, unspecified depression type F32.9    3. Stage 2 chronic kidney disease N18.2    4. Essential hypertension I10        PLAN:    IDDM. FS are labile, she  is on high doses of  meal coverage. She was recently seen by the endocrinologist and they are following her on a weekly basis, multiple changes to her regimen have been made  HTN. Continue  Lisinpril and metoprolol  Neuropathy.mainly  hands with reports of feet also . On lyrica and gabapentin.  CAD. HX  MI and stent.   Facial skin tag- follow up with dermatology.   Foul urine - U/A sent which later revealed no growth.         Electronically signed by: Shawna Mesa CNP

## 2021-06-20 NOTE — LETTER
Letter by Shawna Mesa CNP at      Author: Shawna Mesa CNP Service: -- Author Type: --    Filed:  Encounter Date: 12/12/2019 Status: Signed         Patient: Cristal Preciado   MR Number: [id number]   YOB: 1950   Date of Visit: 12/12/2019     [facility]    Facility:   [facility] [[id number]]   Code Status: FULL CODE      CHIEF COMPLAINT/REASON FOR VISIT:  Chief Complaint   Patient presents with   ? FVP Care Coordination - Regulatory       HISTORY:      HPI: Cristal is a 69 y.o. female residing  in the long-term care facility [facility]. She has been a resident here since October 2011. She does have multiple complex co morbidities. She is treated for hypertension and has insulin-dependent diabetes mellitus. She has a endocrinologist who is following her blood sugars. Nursing is sending weekly checks to her endocrinologist.  She has chronic neuropathy, chronic kidney disease and is treated with Cymbalta for depression. She is on gabapentin and lyrica for neuropathy pain. She is on meds for depression.  Does require a lift for transfers. She has chronic weakness in her lower extremities.      Today she is seen for a routine visit to review multiple medical issues.    She is with the recent hysterectomy October 2019. She reports she is passing flatus and having regular bowel movements.    She denies chest pain or shortness of breath.  She denies cough or congestion.   She is  non ambulatory, She is on  gabapentin and lyrica for neuropathy.. Her BS are being controlled by her endocrinologist and per staff all of her fingersticks were just sent over this past Tuesday.  She denies congestion and tells me she has a dry cough.  Her main report was increased pain in her hands in the morning but tells me as soon as she gets her meds the pain gets better.  Her weights have been stable.  It is unclear whether she is due for a mammogram or colonoscopy.   Staff will follow up with the family to see if she is due for either of those tests and if so they will get scheduled.  Past Medical History:   Diagnosis Date   ? Acute cystitis with hematuria    ? Allergic rhinitis    ? CAD (coronary artery disease)    ? Carpal tunnel syndrome    ? Cataract    ? Cerebral vascular accident (H)    ? Chronic kidney disease    ? Debility    ? Depression    ? Diabetes mellitus, type II (H)    ? Diabetic nephropathy (H)    ? GERD (gastroesophageal reflux disease)    ? Glaucoma    ? Gout    ? History of pyelonephritis    ? HTN (hypertension)    ? Hyperlipidemia    ? IDDM (insulin dependent diabetes mellitus) (H)     Type 2   ? Lower extremity edema    ? Myocardial infarction (H)              Family History   Problem Relation Age of Onset   ? Hypertension Mother    ? Stroke Mother    ? Cancer Father    ? Glaucoma Father    ? Arthritis Brother    ? Diabetes Sister    ? Glaucoma Sister      Social History     Socioeconomic History   ? Marital status: Single     Spouse name: Not on file   ? Number of children: Not on file   ? Years of education: Not on file   ? Highest education level: Not on file   Occupational History   ? Not on file   Social Needs   ? Financial resource strain: Not on file   ? Food insecurity:     Worry: Not on file     Inability: Not on file   ? Transportation needs:     Medical: Not on file     Non-medical: Not on file   Tobacco Use   ? Smoking status: Former Smoker   ? Smokeless tobacco: Never Used   Substance and Sexual Activity   ? Alcohol use: No   ? Drug use: No   ? Sexual activity: Not on file   Lifestyle   ? Physical activity:     Days per week: Not on file     Minutes per session: Not on file   ? Stress: Not on file   Relationships   ? Social connections:     Talks on phone: Not on file     Gets together: Not on file     Attends Mu-ism service: Not on file     Active member of club or organization: Not on file     Attends meetings of clubs or organizations:  Not on file     Relationship status: Not on file   ? Intimate partner violence:     Fear of current or ex partner: Not on file     Emotionally abused: Not on file     Physically abused: Not on file     Forced sexual activity: Not on file   Other Topics Concern   ? Not on file   Social History Narrative    10/13/2015 - The patient lives at [facility] for [facility] years.         Review of Systems  Constitutional: Positive for activity change. Negative for appetite change, chills, fatigue and fever.        Lift for transfers.    HENT: Negative for congestion and sore throat.    Respiratory: Negative for shortness of breath and wheezing.    Cardiovascular: Positive for leg swelling. Negative for chest pain.        Compression stockings   Gastrointestinal: Negative for abdominal distention, abdominal pain, constipation, diarrhea and nausea.   Genitourinary: Negative for dysuria.   Musculoskeletal: Positive for arthralgias. Negative for myalgias.   Skin: Negative for color change, rash and wound.   Neurological: Positive for numbness. Negative for dizziness and weakness.        Severe neuropathy bilateral hands   Psychiatric/Behavioral: Negative for agitation, behavioral problems and sleep disturbance.   Vitals:    12/12/19 0924   BP: 134/58   Pulse: 78   Resp: 18   Temp: 97  F (36.1  C)   SpO2: 94%   Weight: 202 lb 8 oz (91.9 kg)       Physical Exam    Abdominal: She exhibits distension.  Abdomen is soft  Skin:   Multiple lap sites on abdomen completely healed       Constitutional: She is oriented to person, place, and time. She appears well-developed and well-nourished.   Pleasant woman in no acute distress.   HENT:   Head: Normocephalic.   Eyes: Conjunctivae are normal.   Neck: Normal range of motion.   Cardiovascular: Normal rate, regular rhythm and normal heart sounds.   No murmur heard.  Pulmonary/Chest: Breath sounds normal. No respiratory distress. She has no wheezes. She has no rales.   Abdominal: Soft. Bowel sounds  are normal. She exhibits no distension. There is no tenderness.   Musculoskeletal: She exhibits edema.   Wearing Compression socks   Neurological: She is alert and oriented to person, place, and time.   Neuropathy bilateral hands.   Skin: Skin is warm.   Psychiatric: She has a normal mood and affect. Her behavior is normal  LABS:   No results found for this or any previous visit (from the past 240 hour(s)).  Case Management:  I have reviewed the facility/SNF care plan/MDS which was done 11/14/19, including the falls risk, nutrition and pain screening. I also reviewed the current immunizations, and preventive care..  Staff to follow-up with family regarding colonoscopy and mammogram.  Patient's desire to return to the community is not present.    Information reviewed:  Medications, vital signs, orders, and nursing notes.    ASSESSMENT:      ICD-10-CM    1. IDDM (insulin dependent diabetes mellitus) (H) E11.9     Z79.4    2. Essential hypertension I10    3. Neuropathy G62.9    4. Pain management R52        PLAN:       IDDM. FS are stable last A1C 6.6 on 7/23/19 Endocrinologist following , Continue current diabetic medications as above.       HTN. Continue  Lisinpril and metoprolol     Neuropathy.mainly  hands with reports of feet also . On lyrica and gabapentin.     CAD. HX  MI and stent.      S/p Hysterectomy  in October   lap sites healed    Staff to follow-up with family to see if she is due for a colonoscopy or a mammogram.       Electronically signed by: Shawna Mesa CNP

## 2021-06-20 NOTE — LETTER
Letter by Meagan Valdez MD at      Author: Meagan Valdez MD Service: -- Author Type: --    Filed:  Encounter Date: 3/26/2020 Status: (Other)         Patient: Cristal Preciado   MR Number: [id number]   YOB: 1950   Date of Visit: 3/26/2020     [facility]    Facility:   [facility] [[id number]]   Code Status: FULL CODE       Chief Complaint   Patient presents with   ? Review Of Multiple Medical Conditions     [facility] 3/26/20.       HPI:   Cristal is a 70 y.o. female seen for routine physician follow up in [facility] at [facility]. She has been a resident here since October 2011. She does have multiple complex co morbidities. She is treated for hypertension and has insulin-dependent diabetes mellitus. She sees an endocrinologist. She has chronic neuropathy, chronic kidney disease and is treated with Cymbalta for depression and chronic pain. She is on gabapentin and lyrica for neuropathy pain. She has chronic weakness in her lower extremities and is wheelchair bound. She has chronic urinary incontinence. Hospitalized in April 2018 for chest pain. It is noted she has coronary artery disease having had PCI with stent placement in 2009. Her chest pain was reproducible on exam. Negative 3 sets of troponin. EKG showed no significant ischemic changes. Pharmacological stress was negative for inducible ischemia so no intervention was required. She has periodic follow up with cardiology.     She was admitted to the [facility] on 8/5/19 for planned hysterectomy. She had been experiencing brownish vaginal discharge, referred to gynecology with endometrial biopsy showing atypia. Her hospital course was uneventful, she underwent davinci assisted total laparoscopic hysterectomy with bilateral salping oophorectomy. She returned to [facility] on 8/6/19.    Today:  Today she is doing well. She is sitting doing a puzzle and watching TV. Has no complaints. Has not been  ill recently with cough cold or congestion. She is wearing a soft boot on left foot. Previously had heel wound but the skin is intact, wound has healed, fragile so protected with the boot. Mepilex for protection on buttock, no open area. She is nonambulatory in wheelchair. Has chronic pain. No new concerns. No shortness of breath or chest pain. No abdominal pain.  BPs satisfactory. Accuchecks followed for DM, she is on victoza, metformin and insulin, endocrine adjusts meds if needed.       Past Medical History:  Past Medical History:   Diagnosis Date   ? Acute cystitis with hematuria    ? Allergic rhinitis    ? CAD (coronary artery disease)    ? Carpal tunnel syndrome    ? Cataract    ? Cerebral vascular accident (H)    ? Chronic kidney disease    ? Debility    ? Depression    ? Diabetes mellitus, type II (H)    ? Diabetic nephropathy (H)    ? GERD (gastroesophageal reflux disease)    ? Glaucoma    ? Gout    ? History of pyelonephritis    ? HTN (hypertension)    ? Hyperlipidemia    ? IDDM (insulin dependent diabetes mellitus) (H)     Type 2   ? Lower extremity edema    ? Myocardial infarction (H)        Medications:  Current Outpatient Medications   Medication Sig   ? acetaminophen (TYLENOL) 500 MG tablet Take 1,000 mg by mouth 3 (three) times a day .         ? aspirin 81 mg chewable tablet Chew 81 mg daily.   ? atorvastatin (LIPITOR) 80 MG tablet Take 80 mg by mouth bedtime.    ? carboxymethylcellulose (REFRESH PLUS) 0.5 % Dpet ophthalmic dropperette Administer 1 drop to both eyes 3 (three) times a day.   ? chlorthalidone (HYGROTEN) 25 MG tablet Take 25 mg by mouth daily.    ? dorzolamide-timolol (COSOPT) 22.3-6.8 mg/mL ophthalmic solution Administer 1 drop to both eyes 2 (two) times a day.    ? DULoxetine (CYMBALTA) 60 MG capsule Take 90 mg by mouth daily.    ? ferrous sulfate 325 (65 FE) MG tablet Take 1 tablet by mouth daily with breakfast.   ? folic acid (FOLVITE) 1 MG tablet Take 1 mg by mouth daily.   ?  gabapentin (NEURONTIN) 300 MG capsule Take 600 mg by mouth 3 (three) times a day.          ? insulin glargine U-300 conc (TOUJEO MAX U-300 SOLOSTAR) 300 unit/mL (3 mL) InPn Inject 90 Units under the skin daily. 2 injections of 45 units from pen   ? ketoconazole (NIZORAL) 2 % shampoo Apply 1 application topically 2 (two) times a week Apply to damp skin, lather, leave on 5 minutes, and rinse. Apply on Wednesdays and Saturdays..         ? latanoprost (XALATAN) 0.005 % ophthalmic solution Administer 1 drop to both eyes bedtime.    ? liraglutide (VICTOZA) 0.6 mg/0.1 mL (18 mg/3 mL) PnIj injection Inject 1.8 mg under the skin daily. Call [phone] Dr. Washington if she develops nausea lasting >3 days.   ? loratadine (CLARITIN) 10 mg tablet Take 10 mg by mouth daily as needed for allergies.   ? LYRICA 75 mg capsule TAKE 1 CAP BY MOUTH THREE TIMES DAILY   ? metFORMIN (GLUCOPHAGE-XR) 500 MG 24 hr tablet Take 1,000 mg by mouth daily.   ? metoprolol tartrate (LOPRESSOR) 100 MG tablet Take 100 mg by mouth 2 (two) times a day.   ? nitroglycerin (NITROSTAT) 0.4 MG SL tablet Place 0.4 mg under the tongue every 5 (five) minutes as needed for chest pain. 1 tablet SL Q 5 minutes up to 3 doses. Call [phone] if chest pain persists.   ? olopatadine 0.2 % Drop Apply 1 drop to eye daily as needed.   ? omeprazole (PRILOSEC) 20 MG capsule Take 20 mg by mouth daily before breakfast.   ? senna-docusate (SENNOSIDES-DOCUSATE SODIUM) 8.6-50 mg tablet Take 2 tablets by mouth 2 (two) times a day as needed for constipation.          ? simethicone (MYLICON) 80 MG chewable tablet Chew 80 mg 4 (four) times a day. After meals and at HS   ? traMADol (ULTRAM) 50 mg tablet Take 1 tablet (50 mg total) by mouth 3 (three) times a day as needed for pain. For pain 6 or higher   ? triamcinolone (KENALOG) 0.1 % ointment Apply 1 application topically daily. Apply to legs in the morning  And to right ear two times a day prn             Physical Exam:   General:  Patient is alert female, no distress.   Vitals: /68, Temp 97.4, Pulse 72, RR 18, O2 sat 93% RA.  HEENT: Head is NCAT. Eyes show no injection or icterus. Nares negative. Oropharynx well hydrated.  Back: No spinal or CVA tenderness.  : Deferred.  Extremities: Mild LE edema is noted.  Musculoskeletal: Deformities small joints hands.  Psych: Mood appears good.      Labs:  Lab Results   Component Value Date    HGBA1C 7.0 (H) 01/10/2019     Lab Results   Component Value Date    HGBA1C 6.9 (H) 03/18/2020     Lab Results   Component Value Date    WBC 4.3 09/13/2019    HGB 9.8 (L) 09/13/2019    HCT 31.3 (L) 09/13/2019     (H) 09/13/2019     09/13/2019     Results for orders placed or performed in visit on 09/27/19   Basic Metabolic Panel   Result Value Ref Range    Sodium 134 (L) 136 - 145 mmol/L    Potassium 4.1 3.5 - 5.0 mmol/L    Chloride 105 98 - 107 mmol/L    CO2 20 (L) 22 - 31 mmol/L    Anion Gap, Calculation 9 5 - 18 mmol/L    Glucose 107 70 - 125 mg/dL    Calcium 9.9 8.5 - 10.5 mg/dL    BUN 25 (H) 8 - 22 mg/dL    Creatinine 1.22 (H) 0.60 - 1.10 mg/dL    GFR MDRD Af Amer 53 (L) >60 mL/min/1.73m2    GFR MDRD Non Af Amer 44 (L) >60 mL/min/1.73m2         Assessment/Plan:  1. IDDM. On insulin toujeo, victoza, and metformin. Continue to follow accuchecks. She sees endocrinology. Last A1c good at 6.9%.  2. HTN. BPs overall acceptable. On lisinopril, metoprolol, chlorthalidone.    3. Neuropathy. Chronic pain controlled with gabapentin, lyrica and Cymbalta.  4. Hx of stroke. Wheelchair bound, non ambulatory. On aspirin.  5. Glaucoma. Visual impairment at baseline.  6. Hx CVA.    7. Depression. Continue cymbalta.  8. CKD. Labs as noted above.    Overall she appears stable, no new orders needed.         Electronically signed by: Meagan Valdez MD

## 2021-06-20 NOTE — LETTER
Letter by Shawna Mesa CNP at      Author: Shawna Mesa CNP Service: -- Author Type: --    Filed:  Encounter Date: 6/8/2020 Status: (Other)         Patient: Cristal Preciado   MR Number: [id number]   YOB: 1950   Date of Visit: 6/8/2020     [facility]    Facility:   [facility] [[id number]]   Code Status: FULL CODE      CHIEF COMPLAINT/REASON FOR VISIT:  Chief Complaint   Patient presents with   ? FVP Care Coordination - Regulatory       HISTORY:      HPI: Cristal is a 70 y.o. female residing  in the long-term care facility [facility]. She has been a resident here since October 2011. She does have multiple complex co morbidities. She is treated for hypertension and has insulin-dependent diabetes mellitus. She has a endocrinologist who is following her blood sugars. Nursing is sending weekly checks to her endocrinologist.  She has chronic neuropathy, chronic kidney disease and is treated with Cymbalta for depression. She is on gabapentin and lyrica for neuropathy pain. She is on meds for depression.  Does require a lift for transfers. She has chronic weakness in her lower extremities.      Today she is seen for a routine  Regulatory visit .  She is with a  hysterectomy October 2019.   She denies chest pain or shortness of breath.  She denies cough or congestion.   She is  non ambulatory, She is on  gabapentin and lyrica for neuropathy.. Her BS are being controlled by her endocrinologist.   She denies congestion and tells me she has a dry cough and requested cough drops because she can not get out .. Her weights were reviewed and she is down 7 pounds over the last month..      Past Medical History:   Diagnosis Date   ? Acute cystitis with hematuria    ? Allergic rhinitis    ? CAD (coronary artery disease)    ? Carpal tunnel syndrome    ? Cataract    ? Cerebral vascular accident (H)    ? Chronic kidney disease    ? Debility    ? Depression    ?  Diabetes mellitus, type II (H)    ? Diabetic nephropathy (H)    ? GERD (gastroesophageal reflux disease)    ? Glaucoma    ? Gout    ? History of pyelonephritis    ? HTN (hypertension)    ? Hyperlipidemia    ? IDDM (insulin dependent diabetes mellitus) (H)     Type 2   ? Lower extremity edema    ? Myocardial infarction (H)              Family History   Problem Relation Age of Onset   ? Hypertension Mother    ? Stroke Mother    ? Cancer Father    ? Glaucoma Father    ? Arthritis Brother    ? Diabetes Sister    ? Glaucoma Sister      Social History     Socioeconomic History   ? Marital status: Single     Spouse name: Not on file   ? Number of children: Not on file   ? Years of education: Not on file   ? Highest education level: Not on file   Occupational History   ? Not on file   Social Needs   ? Financial resource strain: Not on file   ? Food insecurity     Worry: Not on file     Inability: Not on file   ? Transportation needs     Medical: Not on file     Non-medical: Not on file   Tobacco Use   ? Smoking status: Former Smoker   ? Smokeless tobacco: Never Used   Substance and Sexual Activity   ? Alcohol use: No   ? Drug use: No   ? Sexual activity: Not on file   Lifestyle   ? Physical activity     Days per week: Not on file     Minutes per session: Not on file   ? Stress: Not on file   Relationships   ? Social connections     Talks on phone: Not on file     Gets together: Not on file     Attends Protestant service: Not on file     Active member of club or organization: Not on file     Attends meetings of clubs or organizations: Not on file     Relationship status: Not on file   ? Intimate partner violence     Fear of current or ex partner: Not on file     Emotionally abused: Not on file     Physically abused: Not on file     Forced sexual activity: Not on file   Other Topics Concern   ? Not on file   Social History Narrative    10/13/2015 - The patient lives at [facility] for 5 years.         Review of  Systems  Constitutional: Positive for activity change. Negative for appetite change, chills, fatigue and fever.        Lift for transfers.    HENT: Negative for congestion and sore throat.    Respiratory: Negative for shortness of breath and wheezing.    Cardiovascular: negative for leg swelling . Negative for chest pain. (intermittent Right  upper chest discomfort. She was seen by cardiology and no known cause found. Resident reports relief with Tylenol)       Compression stockings   Gastrointestinal: Negative for abdominal distention, abdominal pain, constipation, diarrhea and nausea.   Genitourinary: Negative for dysuria.   Musculoskeletal: Positive for arthralgias. Negative for myalgias.   Skin: Negative for color change, rash and wound.   Neurological: Positive for numbness. Negative for dizziness and weakness.        Severe neuropathy bilateral hands   Psychiatric/Behavioral: Negative for agitation, behavioral problems and sleep disturbance.   Vitals:    06/08/20 0907   BP: 114/68   Pulse: 64   Resp: 16   Temp: 97.8  F (36.6  C)   SpO2: 98%   Weight: 185 lb 8 oz (84.1 kg)       Physical Exam    Abdominal: She exhibits distension.  Abdomen is soft  Skin:    healed old lap sites on abdomen     Constitutional: She is oriented to person, place, and time. She appears well-developed and well-nourished.   Pleasant woman in no acute distress.   HENT:   Head: Normocephalic.   Eyes: Conjunctivae are normal.   Neck: Normal range of motion.   Cardiovascular: Normal rate, regular rhythm and normal heart sounds.   No murmur heard.  Pulmonary/Chest: Breath sounds normal. No respiratory distress. She has no wheezes. She has no rales.   Abdominal: Soft. Bowel sounds are normal. She exhibits no distension. There is no tenderness.   Musculoskeletal: She exhibits edema.   Wearing Compression socks   Neurological: She is alert and oriented to person, place, and time.   Neuropathy bilateral hands.   Skin: Skin is warm.    Psychiatric: She has a normal mood and affect. Her behavior is normal  LABS:   No results found for this or any previous visit (from the past 240 hour(s)).  Case Management:  I have reviewed the facility/SNF care plan/MDS which was done 5/4/20, including the falls risk, nutrition and pain screening. I also reviewed the current immunizations, and preventive care.. Staff to F/U on mammogram and colonscopy   Patient's desire to return to the community is not present.    Information reviewed:  Medications, vital signs, orders, and nursing notes.    ASSESSMENT:      ICD-10-CM    1. Diabetic peripheral neuropathy associated with type 2 diabetes mellitus (H)  E11.42    2. Depression, unspecified depression type  F32.9    3. Essential hypertension  I10    4. Age-related physical debility  R54        PLAN:       IDDM. FS are stable last A1C 6.9 on 3/18/20 Endocrinologist following , Continue current diabetic medications as above.       HTN. Continue  Lisinpril and metoprolol     Neuropathy.mainly  hands with reports of feet also . On lyrica and gabapentin.     CAD. HX  MI and stent.      S/p Hysterectomy in October   [facility] sites healed    Staff to follow-up with family to see if she is due for a colonoscopy or a mammogram.       Electronically signed by: Shawna Mesa CNP

## 2021-06-20 NOTE — LETTER
Letter by Meagan Valdez MD at      Author: Meagan Valdez MD Service: -- Author Type: --    Filed:  Encounter Date: 7/30/2020 Status: (Other)         Patient: Cristal Preciado   MR Number: [id number]   YOB: 1950   Date of Visit: 7/30/2020     [facility]    Facility:   [facility] [[id number]]   Code Status: FULL CODE       Chief Complaint   Patient presents with   ? Review Of Multiple Medical Conditions     [facility] 7/30/2020. IDDM, HTN, chronic pain.        HPI:   Cristal is a 70 y.o. female seen for routine physician follow up in [facility] at [facility]. She has been a resident here since October 2011. She does have multiple complex co morbidities. She is treated for hypertension and has insulin-dependent diabetes mellitus. She sees an endocrinologist. She has chronic neuropathy, chronic kidney disease and is treated with Cymbalta for depression and chronic pain. She is on gabapentin and lyrica for neuropathy pain. She has chronic weakness in her lower extremities and is wheelchair bound. She has chronic urinary incontinence. Hospitalized in April 2018 for chest pain. It is noted she has coronary artery disease having had PCI with stent placement in 2009. Her chest pain was reproducible on exam. Negative 3 sets of troponin. EKG showed no significant ischemic changes. Pharmacological stress was negative for inducible ischemia so no intervention was required. She has periodic follow up with cardiology. She was admitted to the [facility] on 8/5/19 for planned hysterectomy. She had been experiencing brownish vaginal discharge, referred to gynecology with endometrial biopsy showing atypia. Her hospital course was uneventful, she underwent davinci assisted total laparoscopic hysterectomy with bilateral salping oophorectomy. She returned to [facility] on 8/6/19.    Today:  Cristal has been stable. No new concerns per nursing. She has not had any med  changes. Continues with insulin for diabetes. Per charting, is usually pleasant though occ demanding or yells out. She is wheelchair bound, does not ambulate. She needs assist with cares due to neuropathy and difficulty using her hands. She has not been ill recently with cough cold or congestion. No fever. BPs satisfactory, treated with meds for HTN.      Past Medical History:  Past Medical History:   Diagnosis Date   ? Acute cystitis with hematuria    ? Allergic rhinitis    ? CAD (coronary artery disease)    ? Carpal tunnel syndrome    ? Cataract    ? Cerebral vascular accident (H)    ? Chronic kidney disease    ? Debility    ? Depression    ? Diabetes mellitus, type II (H)    ? Diabetic nephropathy (H)    ? GERD (gastroesophageal reflux disease)    ? Glaucoma    ? Gout    ? History of pyelonephritis    ? HTN (hypertension)    ? Hyperlipidemia    ? IDDM (insulin dependent diabetes mellitus) (H)     Type 2   ? Lower extremity edema    ? Myocardial infarction (H)        Medications:  Current Outpatient Medications   Medication Sig   ? acetaminophen (TYLENOL) 500 MG tablet Take 1,000 mg by mouth 3 (three) times a day .         ? aspirin 81 mg chewable tablet Chew 81 mg daily.   ? atorvastatin (LIPITOR) 80 MG tablet Take 80 mg by mouth bedtime.    ? carboxymethylcellulose (REFRESH PLUS) 0.5 % Dpet ophthalmic dropperette Administer 1 drop to both eyes 3 (three) times a day.   ? chlorthalidone (HYGROTEN) 25 MG tablet Take 25 mg by mouth daily.    ? dorzolamide-timolol (COSOPT) 22.3-6.8 mg/mL ophthalmic solution Administer 1 drop to both eyes 2 (two) times a day.    ? DULoxetine (CYMBALTA) 60 MG capsule Take 90 mg by mouth daily.    ? ferrous sulfate 325 (65 FE) MG tablet Take 1 tablet by mouth daily with breakfast.   ? folic acid (FOLVITE) 1 MG tablet Take 1 mg by mouth daily.   ? gabapentin (NEURONTIN) 300 MG capsule Take 300 mg by mouth 3 (three) times a day.    ? insulin glargine U-300 conc (TOUJEO MAX U-300 SOLOSTAR)  300 unit/mL (3 mL) InPn Inject 90 Units under the skin daily. 2 injections of 45 units from pen   ? ketoconazole (NIZORAL) 2 % shampoo Apply 1 application topically 2 (two) times a week Apply to damp skin, lather, leave on 5 minutes, and rinse. Apply on Wednesdays and Saturdays..         ? latanoprost (XALATAN) 0.005 % ophthalmic solution Administer 1 drop to both eyes bedtime.    ? liraglutide (VICTOZA) 0.6 mg/0.1 mL (18 mg/3 mL) PnIj injection Inject 1.8 mg under the skin daily. Call [phone] Dr. Washington if she develops nausea lasting >3 days.   ? loratadine (CLARITIN) 10 mg tablet Take 10 mg by mouth daily as needed for allergies.   ? LYRICA 75 mg capsule TAKE 1 CAP BY MOUTH THREE TIMES DAILY   ? metFORMIN (GLUCOPHAGE-XR) 500 MG 24 hr tablet Take 1,000 mg by mouth daily.   ? metoprolol tartrate (LOPRESSOR) 100 MG tablet Take 100 mg by mouth 2 (two) times a day.   ? nitroglycerin (NITROSTAT) 0.4 MG SL tablet Place 0.4 mg under the tongue every 5 (five) minutes as needed for chest pain. 1 tablet SL Q 5 minutes up to 3 doses. Call [phone] if chest pain persists.   ? olopatadine 0.2 % Drop Apply 1 drop to eye daily as needed.   ? omeprazole (PRILOSEC) 20 MG capsule Take 20 mg by mouth 2 (two) times a day before meals.    ? senna-docusate (SENNOSIDES-DOCUSATE SODIUM) 8.6-50 mg tablet Take 2 tablets by mouth 2 (two) times a day as needed for constipation.          ? simethicone (MYLICON) 80 MG chewable tablet Chew 80 mg 4 (four) times a day. After meals and at HS   ? traMADol (ULTRAM) 50 mg tablet Take 1 tablet (50 mg total) by mouth 3 (three) times a day as needed for pain. For pain 6 or higher   ? triamcinolone (KENALOG) 0.1 % ointment Apply 1 application topically daily. Apply to legs in the morning  And to right ear two times a day prn             Physical Exam:   Note: COVID-19 pandemic precautions in place. Physical exam performed with social distancing considerations.  General: Patient is alert female, no  distress.   Vitals: /64, Temp 98.3, Pulse 71, RR 18, O2 sat 98% RA.  HEENT: Head is NCAT. Eyes show no injection or icterus. Nares negative. Oropharynx well hydrated.  Back: No spinal or CVA tenderness.  : Deferred.  Extremities: Mild LE edema is noted.  Musculoskeletal: Deformities small joints hands.  Psych: Mood appears good.      Labs:  Lab Results   Component Value Date    HGBA1C 7.0 (H) 01/10/2019     Lab Results   Component Value Date    HGBA1C 6.9 (H) 03/18/2020     Lab Results   Component Value Date    WBC 4.3 09/13/2019    HGB 9.8 (L) 09/13/2019    HCT 31.3 (L) 09/13/2019     (H) 09/13/2019     09/13/2019     Results for orders placed or performed in visit on 09/27/19   Basic Metabolic Panel   Result Value Ref Range    Sodium 134 (L) 136 - 145 mmol/L    Potassium 4.1 3.5 - 5.0 mmol/L    Chloride 105 98 - 107 mmol/L    CO2 20 (L) 22 - 31 mmol/L    Anion Gap, Calculation 9 5 - 18 mmol/L    Glucose 107 70 - 125 mg/dL    Calcium 9.9 8.5 - 10.5 mg/dL    BUN 25 (H) 8 - 22 mg/dL    Creatinine 1.22 (H) 0.60 - 1.10 mg/dL    GFR MDRD Af Amer 53 (L) >60 mL/min/1.73m2    GFR MDRD Non Af Amer 44 (L) >60 mL/min/1.73m2         Assessment/Plan:  1. IDDM. On insulin toujeo, victoza, and metformin. Continue to follow accuchecks. She sees endocrinology. Last A1c good at 6.9%.  2. HTN. She is treated with metoprolol and chlorthalidone. Overall BPs acceptable, no need for changes.   3. Neuropathy. Chronic pain controlled with gabapentin, lyrica and Cymbalta.  4. Hx of stroke. Wheelchair bound, non ambulatory. On aspirin.  5. Glaucoma. Visual impairment at baseline.  6. Hx CVA. Continue aspirin.  7. Depression. Continue cymbalta.  8. CKD. Labs as noted above.  9. Anemia. Last hgb at 9.8. Check periodically.          Electronically signed by: Meagan Valdez MD

## 2021-06-20 NOTE — LETTER
Letter by Shawna Mesa CNP at      Author: Shawna Mesa CNP Service: -- Author Type: --    Filed:  Encounter Date: 3/19/2020 Status: (Other)         Patient: Cristal Preciado   MR Number: [id number]   YOB: 1950   Date of Visit: 3/19/2020     [facility]    Facility:   [facility] [[id number]]   Code Status: FULL CODE      CHIEF COMPLAINT/REASON FOR VISIT:  Chief Complaint   Patient presents with   ? FVP Care Coordination - Home Visit-Annual Assessment       HISTORY:      HPI: Cristal is a 69 y.o. female residing  in the long-term care facility [facility]. She has been a resident here since October 2011. She does have multiple complex co morbidities. She is treated for hypertension and has insulin-dependent diabetes mellitus. She has a endocrinologist who is following her blood sugars. Nursing is sending weekly checks to her endocrinologist.  She has chronic neuropathy, chronic kidney disease and is treated with Cymbalta for depression. She is on gabapentin and lyrica for neuropathy pain. She is on meds for depression.  Does require a lift for transfers. She has chronic weakness in her lower extremities.      Today she is seen for an annual regulatory visit  to review multiple medical issues.    She is with the recent hysterectomy October 2019.   She denies constipation or diarrhea.  She reports she is passing flatus and having regular bowel movements.    She denies chest pain or shortness of breath.  She denies cough or congestion.   She is  non ambulatory, She is on  gabapentin and lyrica for neuropathy.. Her BS are being controlled by her endocrinologist and staff is sending her readings.  she denies congestion.  Her weights have been stable over the last month.  She is up to date on her mammogram and  colonoscopy and she is not due for another colonoscopy for 5 more years.  Per staff she is on a  restorative program and she gets daily  exercises Monday through Friday.  She had no lower extremity edema.  Her last A1c was 6.9 on 3/18/2020      Past Medical History:   Diagnosis Date   ? Acute cystitis with hematuria    ? Allergic rhinitis    ? CAD (coronary artery disease)    ? Carpal tunnel syndrome    ? Cataract    ? Cerebral vascular accident (H)    ? Chronic kidney disease    ? Debility    ? Depression    ? Diabetes mellitus, type II (H)    ? Diabetic nephropathy (H)    ? GERD (gastroesophageal reflux disease)    ? Glaucoma    ? Gout    ? History of pyelonephritis    ? HTN (hypertension)    ? Hyperlipidemia    ? IDDM (insulin dependent diabetes mellitus) (H)     Type 2   ? Lower extremity edema    ? Myocardial infarction (H)              Family History   Problem Relation Age of Onset   ? Hypertension Mother    ? Stroke Mother    ? Cancer Father    ? Glaucoma Father    ? Arthritis Brother    ? Diabetes Sister    ? Glaucoma Sister      Social History     Socioeconomic History   ? Marital status: Single     Spouse name: Not on file   ? Number of children: Not on file   ? Years of education: Not on file   ? Highest education level: Not on file   Occupational History   ? Not on file   Social Needs   ? Financial resource strain: Not on file   ? Food insecurity     Worry: Not on file     Inability: Not on file   ? Transportation needs     Medical: Not on file     Non-medical: Not on file   Tobacco Use   ? Smoking status: Former Smoker   ? Smokeless tobacco: Never Used   Substance and Sexual Activity   ? Alcohol use: No   ? Drug use: No   ? Sexual activity: Not on file   Lifestyle   ? Physical activity     Days per week: Not on file     Minutes per session: Not on file   ? Stress: Not on file   Relationships   ? Social connections     Talks on phone: Not on file     Gets together: Not on file     Attends Religion service: Not on file     Active member of club or organization: Not on file     Attends meetings of clubs or organizations: Not on file      Relationship status: Not on file   ? Intimate partner violence     Fear of current or ex partner: Not on file     Emotionally abused: Not on file     Physically abused: Not on file     Forced sexual activity: Not on file   Other Topics Concern   ? Not on file   Social History Narrative    10/13/2015 - The patient lives at [facility] for 5 years.         Review of Systems  Constitutional: Positive for activity change. Negative for appetite change, chills, fatigue and fever.        Lift for transfers.    HENT: Negative for congestion and sore throat.    Respiratory: Negative for shortness of breath and wheezing.    Cardiovascular: negative for leg swelling  Negative for chest pain.        Compression stockings   Gastrointestinal: Negative for abdominal distention, abdominal pain, constipation, diarrhea and nausea.   Genitourinary: Negative for dysuria.   Musculoskeletal: Positive for arthralgias. Negative for myalgias.   Skin: Negative for color change, rash and wound.   Neurological: Positive for numbness. Negative for dizziness and weakness.        Severe neuropathy bilateral hands   Psychiatric/Behavioral: Negative for agitation, behavioral problems and sleep disturbance.   Vitals:    03/19/20 0907   BP: 129/67   Pulse: 80   Resp: 20   Temp: 97  F (36.1  C)   SpO2: 96%   Weight: 198 lb (89.8 kg)       Physical Exam    Abdominal: She exhibits distension.  Abdomen is soft  Skin:   Healed lap sites.    Constitutional: She is oriented to person, place, and time. She appears well-developed and well-nourished.   Pleasant woman in no acute distress.   HENT:   Head: Normocephalic.   Eyes: Conjunctivae are normal.   Neck: Normal range of motion.   Cardiovascular: Normal rate, regular rhythm and normal heart sounds.   No murmur heard.  Pulmonary/Chest: Breath sounds normal. No respiratory distress. She has no wheezes. She has no rales.   Abdominal: Soft. Bowel sounds are normal. She exhibits no distension. There is no  tenderness.   Musculoskeletal: Patient is a [place] lift  Wearing Compression socks   Neurological: She is alert and oriented to person, place, and time.   Neuropathy bilateral hands.   Skin: Skin is warm.   Psychiatric: She has a normal mood and affect. Her behavior is normal  LABS:   Recent Results (from the past 240 hour(s))   Glycosylated Hemoglobin A1C   Result Value Ref Range    Hemoglobin A1c 6.9 (H) <=5.6 %     Case Management:  I have reviewed the facility/SNF care plan/MDS which was done 2/10/20 including the falls risk, nutrition and pain screening. I also reviewed the current immunizations, and preventive care..  Staff to follow-up with family regarding colonoscopy and mammogram.  Patient's desire to return to the community is not present.    Information reviewed:  Medications, vital signs, orders, and nursing notes.    ASSESSMENT:      ICD-10-CM    1. Type 2 diabetes mellitus with hyperglycemia, with long-term current use of insulin (H)  E11.65     Z79.4    2. Essential hypertension  I10    3. Age-related physical debility  R54        PLAN:       IDDM. FS are stable last A1C 6.9 on 3/18/2020 Endocrinologist following , Continue current diabetic medications as above.   She is followed by endocrinology      HTN. Continue  Lisinpril and metoprolol     Neuropathy.mainly  hands with reports of feet also . On lyrica and gabapentin.     CAD. HX  MI and stent.      S/p Hysterectomy in October   lap sites healed    Depression on Cymbalta       Electronically signed by: Shawna Mesa, CNP

## 2021-06-20 NOTE — LETTER
Letter by Shawna Mesa CNP at      Author: Shawna Mesa CNP Service: -- Author Type: --    Filed:  Encounter Date: 9/16/2020 Status: (Other)         Patient: Cristal Preciado   MR Number: [id number]   YOB: 1950   Date of Visit: 9/16/2020     [facility]    Facility:   [facility] [[id number]]   Code Status: FULL CODE      CHIEF COMPLAINT/REASON FOR VISIT:  Chief Complaint   Patient presents with   ? FVP Care Coordination - Regulatory       HISTORY:      HPI: Cristal is a 70 y.o. female residing  in the long-term care facility [facility]. She has been a resident here since October 2011. She does have multiple complex co morbidities. She is treated for hypertension and has insulin-dependent diabetes mellitus. She has a endocrinologist who is following her blood sugars. Nursing is sending weekly checks to her endocrinologist.  She has chronic neuropathy, chronic kidney disease and is treated with Cymbalta for depression. She is on gabapentin and lyrica for neuropathy pain. She is on meds for depression.  Does require a lift for transfers. She has chronic weakness in her lower extremities.      Today she is seen for a routine  Regulatory visit .  She is with a  hysterectomy October 2019.   She denies chest pain or shortness of breath.  She denies cough or congestion.  She is  non ambulatory, She is on  gabapentin and lyrica for neuropathy.. Her BS are being controlled by her endocrinologist and being sent in every couple of weeks.   She denies cough or  Congestion. She is having no pain today. No open areas on her skin.     Past Medical History:   Diagnosis Date   ? Acute cystitis with hematuria    ? Allergic rhinitis    ? CAD (coronary artery disease)    ? Carpal tunnel syndrome    ? Cataract    ? Cerebral vascular accident (H)    ? Chronic kidney disease    ? Debility    ? Depression    ? Diabetes mellitus, type II (H)    ? Diabetic nephropathy  (H)    ? GERD (gastroesophageal reflux disease)    ? Glaucoma    ? Gout    ? History of pyelonephritis    ? HTN (hypertension)    ? Hyperlipidemia    ? IDDM (insulin dependent diabetes mellitus) (H)     Type 2   ? Lower extremity edema    ? Myocardial infarction (H)              Family History   Problem Relation Age of Onset   ? Hypertension Mother    ? Stroke Mother    ? Cancer Father    ? Glaucoma Father    ? Arthritis Brother    ? Diabetes Sister    ? Glaucoma Sister      Social History     Socioeconomic History   ? Marital status: Single     Spouse name: Not on file   ? Number of children: Not on file   ? Years of education: Not on file   ? Highest education level: Not on file   Occupational History   ? Not on file   Social Needs   ? Financial resource strain: Not on file   ? Food insecurity     Worry: Not on file     Inability: Not on file   ? Transportation needs     Medical: Not on file     Non-medical: Not on file   Tobacco Use   ? Smoking status: Former Smoker   ? Smokeless tobacco: Never Used   Substance and Sexual Activity   ? Alcohol use: No   ? Drug use: No   ? Sexual activity: Not on file   Lifestyle   ? Physical activity     Days per week: Not on file     Minutes per session: Not on file   ? Stress: Not on file   Relationships   ? Social connections     Talks on phone: Not on file     Gets together: Not on file     Attends [employer] service: Not on file     Active member of club or organization: Not on file     Attends meetings of clubs or organizations: Not on file     Relationship status: Not on file   ? Intimate partner violence     Fear of current or ex partner: Not on file     Emotionally abused: Not on file     Physically abused: Not on file     Forced sexual activity: Not on file   Other Topics Concern   ? Not on file   Social History Narrative    10/13/2015 - The patient lives at [facility] for [facility] years.         Review of Systems  Constitutional: Positive for activity change. Negative  for appetite change, chills, fatigue and fever.        Lift for transfers.    HENT: Negative for congestion and sore throat.    Respiratory: Negative for shortness of breath and wheezing.    Cardiovascular: negative for leg swelling . Negative for chest pain. (intermittent Right  upper chest discomfort. She was seen by cardiology and no known cause found. Resident reports relief with Tylenol)       Compression stockings   Gastrointestinal: Negative for abdominal distention, abdominal pain, constipation, diarrhea and nausea.   Genitourinary: Negative for dysuria.   Musculoskeletal: Positive for arthralgias. Negative for myalgias.   Skin: Negative for color change, rash and wound.   Neurological: Positive for numbness. Negative for dizziness and weakness.        Severe neuropathy bilateral hands   Psychiatric/Behavioral: Negative for agitation, behavioral problems and sleep disturbance.   Vitals:    09/16/20 0927   BP: 107/86   Pulse: 72   Resp: 16   Temp: 98.2  F (36.8  C)   SpO2: 99%   Weight: 184 lb 8 oz (83.7 kg)       Physical Exam    Abdominal: She exhibits distension.  Abdomen is soft  Skin:    healed old lap sites on abdomen     Constitutional: She is oriented to person, place, and time. She appears well-developed and well-nourished.   Pleasant woman in no acute distress.   HENT:   Head: Normocephalic.   Eyes: Conjunctivae are normal.   Neck: Normal range of motion.   Cardiovascular: Normal rate, regular rhythm and normal heart sounds.   No murmur heard.  Pulmonary/Chest: Breath sounds normal. No respiratory distress. She has no wheezes. She has no rales.   Abdominal: Soft. Bowel sounds are normal. She exhibits no distension. There is no tenderness.   Musculoskeletal: She exhibits edema.   Wearing Compression socks   Neurological: She is alert and oriented to person, place, and time.   Neuropathy bilateral hands.   Skin: Skin is warm.   Psychiatric: She has a normal mood and affect. Her behavior is  normal  LABS:   No results found for this or any previous visit (from the past 240 hour(s)).  Case Management:  I have reviewed the facility/SNF care plan/MDS which was done 8/4/20 including the falls risk, nutrition and pain screening. I also reviewed the current immunizations, and preventive care..She is up to date on tests. Patient's desire to return to the community is not present.    Information reviewed:  Medications, vital signs, orders, and nursing notes.    ASSESSMENT:      ICD-10-CM    1. Type 2 diabetes mellitus with hyperglycemia, with long-term current use of insulin (H)  E11.65     Z79.4    2. Essential hypertension  I10    3. Physical deconditioning  R53.81        PLAN:       IDDM. FS are stable last A1C 6.9 on 3/18/20 Endocrinologist following , Continue current diabetic medications as above.       HTN. Continue  Lisinpril and metoprolol     Neuropathy.mainly  hands with reports of feet also . On lyrica and gabapentin.     CAD. HX  MI and stent.      S/p Hysterectomy in October         Electronically signed by: Shawna Mesa CNP

## 2021-06-21 NOTE — LETTER
Letter by Shawna Mesa CNP at      Author: Shawna Mesa CNP Service: -- Author Type: --    Filed:  Encounter Date: 10/28/2020 Status: (Other)         Patient: Cristal Preciado   MR Number: [id number]   YOB: 1950   Date of Visit: 10/28/2020     [facility]    Facility:   [facility] [[id number]]   Code Status: FULL CODE      CHIEF COMPLAINT/REASON FOR VISIT:  Chief Complaint   Patient presents with   ? Problem Visit     review labs, cough        HISTORY:      HPI: Cristal is a 70 y.o. female residing  in the long-term care facility [facility]. She has been a resident here since October 2011. She does have multiple complex co morbidities. She is treated for hypertension and has insulin-dependent diabetes mellitus. She has a endocrinologist who is following her blood sugars. Nursing is sending weekly checks to her endocrinologist.  She has chronic neuropathy, chronic kidney disease and is treated with Cymbalta for depression. She is on gabapentin and lyrica for neuropathy pain. She is on meds for depression.  Does require a lift for transfers. She has chronic weakness in her lower extremities.      Today she is seen for review of abnormal ferritin level. Iron studies and CBC to be checked. She is currently on ferrous sulfate.  She is with a  hysterectomy October 2019.   She denies chest pain or shortness of breath.  She reports a dry cough.Covid negative on 10/9/20.   She is  non ambulatory, She is on  gabapentin and lyrica for neuropathy.. Her BS are being controlled by her endocrinologist and being sent in every couple of weeks.    No open areas on her skin.     Past Medical History:   Diagnosis Date   ? Acute cystitis with hematuria    ? Allergic rhinitis    ? CAD (coronary artery disease)    ? Carpal tunnel syndrome    ? Cataract    ? Cerebral vascular accident (H)    ? Chronic kidney disease    ? Debility    ? Depression    ? Diabetes  mellitus, type II (H)    ? Diabetic nephropathy (H)    ? GERD (gastroesophageal reflux disease)    ? Glaucoma    ? Gout    ? History of pyelonephritis    ? HTN (hypertension)    ? Hyperlipidemia    ? IDDM (insulin dependent diabetes mellitus) (H)     Type 2   ? Lower extremity edema    ? Myocardial infarction (H)              Family History   Problem Relation Age of Onset   ? Hypertension Mother    ? Stroke Mother    ? Cancer Father    ? Glaucoma Father    ? Arthritis Brother    ? Diabetes Sister    ? Glaucoma Sister      Social History     Socioeconomic History   ? Marital status: Single     Spouse name: Not on file   ? Number of children: Not on file   ? Years of education: Not on file   ? Highest education level: Not on file   Occupational History   ? Not on file   Social Needs   ? Financial resource strain: Not on file   ? Food insecurity     Worry: Not on file     Inability: Not on file   ? Transportation needs     Medical: Not on file     Non-medical: Not on file   Tobacco Use   ? Smoking status: Former Smoker   ? Smokeless tobacco: Never Used   Substance and Sexual Activity   ? Alcohol use: No   ? Drug use: No   ? Sexual activity: Not on file   Lifestyle   ? Physical activity     Days per week: Not on file     Minutes per session: Not on file   ? Stress: Not on file   Relationships   ? Social connections     Talks on phone: Not on file     Gets together: Not on file     Attends L[employer] day service: Not on file     Active member of club or organization: Not on file     Attends meetings of clubs or organizations: Not on file     Relationship status: Not on file   ? Intimate partner violence     Fear of current or ex partner: Not on file     Emotionally abused: Not on file     Physically abused: Not on file     Forced sexual activity: Not on file   Other Topics Concern   ? Not on file   Social History Narrative    10/13/2015 - The patient lives at [facility] for 5 years.         Review of  Systems  Constitutional: Positive for activity change. Negative for appetite change, chills, fatigue and fever.        Lift for transfers.    HENT: Negative for congestion and sore throat.    Respiratory: Negative for shortness of breath and wheezing.    Cardiovascular: negative for leg swelling . Negative for chest pain. (intermittent Right  upper chest discomfort. She was seen by cardiology and no known cause found. Resident reports relief with Tylenol)       Compression stockings   Gastrointestinal: Negative for abdominal distention, abdominal pain, constipation, diarrhea and nausea.   Genitourinary: Negative for dysuria.   Musculoskeletal: Positive for arthralgias. Negative for myalgias.   Skin: Negative for color change, rash and wound.   Neurological: Positive for numbness. Negative for dizziness and weakness.        Severe neuropathy bilateral hands   Psychiatric/Behavioral: Negative for agitation, behavioral problems and sleep disturbance.   Vitals:    10/28/20 0921   BP: 114/68   Pulse: 72   Resp: 20   Temp: 98.5  F (36.9  C)   SpO2: 95%   Weight: 182 lb (82.6 kg)       Physical Exam    Abdominal: She exhibits distension.  Abdomen is soft  Skin:    healed old lap sites on abdomen     Constitutional: She is oriented to person, place, and time. She appears well-developed and well-nourished.   Pleasant woman in no acute distress.   HENT:   Head: Normocephalic.   Eyes: Conjunctivae are normal.   Neck: Normal range of motion.   Cardiovascular: Normal rate, regular rhythm and normal heart sounds.   No murmur heard.  Pulmonary/Chest: Breath sounds normal. No respiratory distress. She has no wheezes. She has no rales.   Abdominal: Soft. Bowel sounds are normal. She exhibits no distension. There is no tenderness.   Musculoskeletal: She exhibits edema.   Wearing Compression socks   Neurological: She is alert and oriented to person, place, and time.   Neuropathy bilateral hands.   Skin: Skin is warm.   Psychiatric:  She has a normal mood and affect. Her behavior is normal  LABS:   No results found for this or any previous visit (from the past 240 hour(s)).  ASSESSMENT:      ICD-10-CM    1. Elevated ferritin  R79.89    2. Cough  R05        PLAN:      Increased ferritin- Check iron studies and CBC. Currently on ferrous sulfate.      IDDM. FS are stable last A1C 6.9 on 3/18/20 Endocrinologist following , Continue current diabetic medications as above.       HTN. Continue  Lisinpril and metoprolol     Neuropathy.mainly  hands with reports of feet also . On lyrica and gabapentin.     CAD. HX  MI and stent.      S/p Hysterectomy in October 2019        Electronically signed by: Shawna Mesa CNP

## 2021-06-21 NOTE — LETTER
Letter by Shawna Mesa CNP at      Author: Shawna Mesa CNP Service: -- Author Type: --    Filed:  Encounter Date: 5/5/2021 Status: (Other)         Patient: Cristal Preciado   MR Number: [id number]   YOB: 1950   Date of Visit: 5/5/2021     [facility]    Facility:   [facility] [[id number]]   Code Status: FULL CODE      CHIEF COMPLAINT/REASON FOR VISIT:  Chief Complaint   Patient presents with   ? FVP Care Coordination - Regulatory       HISTORY:      HPI: Cristal is a 71 y.o. female residing  in the long-term care facility [facility]. She has been a resident here since October 2011. She does have multiple complex co- morbidities. She is treated for hypertension and has insulin-dependent diabetes mellitus. She has a endocrinologist who follows her  blood sugars regularly.  She has chronic neuropathy, chronic kidney disease and is treated with Cymbalta for depression. She is on gabapentin and lyrica for neuropathy pain. She is on meds for depression.  Does require a lift for transfers. She has chronic weakness in her lower extremities.      Today she is seen for a routine  Regulatory visit .  She is with a  hysterectomy October 2019.   She denies chest pain or shortness of breath.  She denies cough or congestion.  She is  non ambulatory, She is on  gabapentin and lyrica for neuropathy.. Her BS are being controlled by her endocrinologist and being sent in every couple of weeks.   She denies cough or congestion.  She is having intermittent numbness and pain in her hands.  No open areas on her skin.  Her weights were reviewed and she is down 2.5 pounds over the last month.    Past Medical History:   Diagnosis Date   ? Acute cystitis with hematuria    ? Allergic rhinitis    ? CAD (coronary artery disease)    ? Carpal tunnel syndrome    ? Cataract    ? Cerebral vascular accident (H)    ? Chronic kidney disease    ? Debility    ? Depression    ?  Diabetes mellitus, type II (H)    ? Diabetic nephropathy (H)    ? GERD (gastroesophageal reflux disease)    ? Glaucoma    ? Gout    ? History of pyelonephritis    ? HTN (hypertension)    ? Hyperlipidemia    ? IDDM (insulin dependent diabetes mellitus) (H)     Type 2   ? Lower extremity edema    ? Myocardial infarction (H)              Family History   Problem Relation Age of Onset   ? Hypertension Mother    ? Stroke Mother    ? Cancer Father    ? Glaucoma Father    ? Arthritis Brother    ? Diabetes Sister    ? Glaucoma Sister      Social History     Socioeconomic History   ? Marital status: Single     Spouse name: Not on file   ? Number of children: Not on file   ? Years of education: Not on file   ? Highest education level: Not on file   Occupational History   ? Not on file   Social Needs   ? Financial resource strain: Not on file   ? Food insecurity     Worry: Not on file     Inability: Not on file   ? Transportation needs     Medical: Not on file     Non-medical: Not on file   Tobacco Use   ? Smoking status: Former Smoker   ? Smokeless tobacco: Never Used   Substance and Sexual Activity   ? Alcohol use: No   ? Drug use: No   ? Sexual activity: Not on file   Lifestyle   ? Physical activity     Days per week: Not on file     Minutes per session: Not on file   ? Stress: Not on file   Relationships   ? Social connections     Talks on phone: Not on file     Gets together: Not on file     Attends Tenriism service: Not on file     Active member of club or organization: Not on file     Attends meetings of clubs or organizations: Not on file     Relationship status: Not on file   ? Intimate partner violence     Fear of current or ex partner: Not on file     Emotionally abused: Not on file     Physically abused: Not on file     Forced sexual activity: Not on file   Other Topics Concern   ? Not on file   Social History Narrative    10/13/2015 - The patient lives at [facility] for 5 years.         Review of  Systems  Constitutional: Positive for activity change. Negative for appetite change, chills, fatigue and fever.        Lift for transfers.    HENT: Negative for congestion and sore throat.    Respiratory: Negative for shortness of breath and wheezing.    Cardiovascular: negative for leg swelling . Negative for chest pain. (intermittent Right  upper chest discomfort. She was seen by cardiology and no known cause found.  Denies today     compression stockings   Gastrointestinal: Negative for abdominal distention, abdominal pain, constipation, diarrhea and nausea.   Genitourinary: Negative for dysuria.   Musculoskeletal: Positive for arthralgias. Negative for myalgias.   Skin: Negative for color change, rash and wound.   Neurological: Positive for numbness. Negative for dizziness and weakness.        Severe neuropathy bilateral hands   Psychiatric/Behavioral: Negative for agitation, behavioral problems and sleep disturbance.   Vitals:    05/05/21 1101   BP: 128/60   Pulse: 76   Resp: 18   Temp: 98.3  F (36.8  C)   SpO2: 98%   Weight: 177 lb 8 oz (80.5 kg)       Physical Exam    Abdominal: She exhibits distension.  Abdomen is soft  Skin:    healed old lap sites on abdomen     Constitutional: She is oriented to person, place, and time. She appears well-developed and well-nourished.   Pleasant woman in no acute distress.   HENT:   Head: Normocephalic.   Eyes: Conjunctivae are normal.   Neck: Normal range of motion.   Cardiovascular: Normal rate, regular rhythm and normal heart sounds.   No murmur heard.  Pulmonary/Chest: Breath sounds normal. No respiratory distress. She has no wheezes. She has no rales.   Abdominal: Soft. Bowel sounds are normal. She exhibits no distension. There is no tenderness.   Musculoskeletal: She exhibits edema.   Wearing Compression socks   Neurological: She is alert and oriented to person, place, and time.   Neuropathy bilateral hands.   Skin: Skin is warm.   Psychiatric: She has a normal mood  and affect. Her behavior is normal  LABS:   No results found for this or any previous visit (from the past 240 hour(s)).  Case Management:  I have reviewed the facility/SNF care plan/MDS which was done 4/26/2021 including the falls risk, nutrition and pain screening. I also reviewed the current immunizations, and preventive care..She is up to date on tests. Patient's desire to return to the community is not present.    Information reviewed:  Medications, vital signs, orders, and nursing notes.    ASSESSMENT:      ICD-10-CM    1. Type 2 diabetes mellitus with hyperglycemia, with long-term current use of insulin (H)  E11.65     Z79.4    2. Diabetic peripheral neuropathy associated with type 2 diabetes mellitus (H)  E11.42        PLAN:       IDDM. FS are stable last A1C 6.9 on 3/18/20 Endocrinologist following , Continue current diabetic medications as above.       HTN. Continue  Lisinpril and metoprolol-stable     Neuropathy   On lyrica and gabapentin.     CAD. HX  MI and stent.      S/p Hysterectomy in October 2019      Electronically signed by: Shawna Mesa CNP

## 2021-06-21 NOTE — LETTER
"Letter by Meagan Valdez MD at      Author: Meagan Valdez MD Service: -- Author Type: --    Filed:  Encounter Date: 11/30/2020 Status: (Other)         Patient: Cristal Preciado   MR Number: [id number]   YOB: 1950   Date of Visit: 11/30/2020     [facility]    Facility:   Formerly named [facility] [[id number]]   Code Status: FULL CODE       Chief Complaint   Patient presents with   ? Review Of Multiple Medical Conditions     [facility] 11/30/2020. DM, HTN, Multiple debilities.        HPI:  Cristal Preciado is a 70 y.o. female who is being evaluated via a billable telephone visit.      The patient has been notified of following:     \"This telephone visit will be conducted via a call between you and your physician/provider. We have found that certain health care needs can be provided without the need for a physical exam.  This service lets us provide the care you need with a short phone conversation.  If a prescription is necessary we can send it directly to your pharmacy.  If lab work is needed we can place an order for that and you can then stop by our lab to have the test done at a later time.    (Note: Lab work can be done at the  if needed).    Telephone visits are billed at different rates depending on your insurance coverage. During this emergency period, for some insurers they may be billed the same as an in-person visit.  Please reach out to your insurance provider with any questions.    If during the course of the call the physician/provider feels a telephone visit is not appropriate, you will not be charged for this service.\"    Patient has given verbal consent to a Telephone visit? Yes      HPI:   Cristal is a 70 y.o. female seen for routine physician follow up in [facility] at [facility]. She has been a resident here since October 2011. She does have multiple complex co morbidities. She is treated for hypertension and has insulin-dependent diabetes " mellitus. She sees an endocrinologist. She has chronic neuropathy, chronic kidney disease and is treated with Cymbalta for depression and chronic pain. She is on gabapentin and lyrica for neuropathy pain. She has chronic weakness in her lower extremities and is wheelchair bound. She has chronic urinary incontinence. Hospitalized in April 2018 for chest pain. It is noted she has coronary artery disease having had PCI with stent placement in 2009. Her chest pain was reproducible on exam. Negative 3 sets of troponin. EKG showed no significant ischemic changes. Pharmacological stress was negative for inducible ischemia so no intervention was required. She has periodic follow up with cardiology. She was admitted to the [facility] on 8/5/19 for planned hysterectomy. She had been experiencing brownish vaginal discharge, referred to gynecology with endometrial biopsy showing atypia. Her hospital course was uneventful, she underwent davinci assisted total laparoscopic hysterectomy with bilateral salping oophorectomy. She returned to [facility] on 8/6/19.      Today:  No significant interim concerns since my last visit. Iron was discontinued otherwise no medication changes. She continues to see endocrinology periodically due to diabetes, currently managed with toujeo, victoza and metformin. Hypertension treated with metoprolol and chlorthalidone. BPs satisfactory. She has no new complaints today. No concerns per nursing. Per charting, moods are variable though typical pattern for her. She likes to watch TV in her room. She has fragile areas of her skin in coccyx and gluteal fold, using Mepilex to protect. Has chronic pain, managed with lyrica and gabapentin, no increased concerns today. Facility is doing surveillance for Covid-19 and she has tested neg. No cough, fever or hypoxia. She is wheelchair bound, does not ambulate. She needs assist with cares due to neuropathy and difficulty using her hands.       Past Medical  History:  Past Medical History:   Diagnosis Date   ? Acute cystitis with hematuria    ? Allergic rhinitis    ? CAD (coronary artery disease)    ? Carpal tunnel syndrome    ? Cataract    ? Cerebral vascular accident (H)    ? Chronic kidney disease    ? Debility    ? Depression    ? Diabetes mellitus, type II (H)    ? Diabetic nephropathy (H)    ? GERD (gastroesophageal reflux disease)    ? Glaucoma    ? Gout    ? History of pyelonephritis    ? HTN (hypertension)    ? Hyperlipidemia    ? IDDM (insulin dependent diabetes mellitus) (H)     Type 2   ? Lower extremity edema    ? Myocardial infarction (H)        Medications:  Current Outpatient Medications   Medication Sig   ? acetaminophen (TYLENOL) 500 MG tablet Take 1,000 mg by mouth 3 (three) times a day .         ? aspirin 81 mg chewable tablet Chew 81 mg daily.   ? atorvastatin (LIPITOR) 80 MG tablet Take 80 mg by mouth bedtime.    ? benzocaine-menthoL (CEPACOL) 15-3.6 mg Take 1 lozenge by mouth every 2 (two) hours as needed.   ? carboxymethylcellulose (REFRESH PLUS) 0.5 % Dpet ophthalmic dropperette Administer 1 drop to both eyes 3 (three) times a day.   ? chlorthalidone (HYGROTEN) 25 MG tablet Take 25 mg by mouth daily.    ? dorzolamide-timolol (COSOPT) 22.3-6.8 mg/mL ophthalmic solution Administer 1 drop to both eyes 2 (two) times a day.    ? DULoxetine (CYMBALTA) 60 MG capsule Take 90 mg by mouth daily.    ? folic acid (FOLVITE) 1 MG tablet Take 1 mg by mouth daily.   ? gabapentin (NEURONTIN) 300 MG capsule Take 300 mg by mouth 3 (three) times a day.    ? insulin glargine U-300 conc (TOUJEO MAX U-300 SOLOSTAR) 300 unit/mL (3 mL) InPn Inject 90 Units under the skin daily. 2 injections of 45 units from pen   ? ketoconazole (NIZORAL) 2 % shampoo Apply 1 application topically 2 (two) times a week Apply to damp skin, lather, leave on 5 minutes, and rinse. Apply on Wednesdays and Saturdays..         ? latanoprost (XALATAN) 0.005 % ophthalmic solution Administer 1 drop  to both eyes bedtime.    ? liraglutide (VICTOZA) 0.6 mg/0.1 mL (18 mg/3 mL) PnIj injection Inject 1.8 mg under the skin daily. Call [phone] Dr. Washington if she develops nausea lasting >3 days.   ? loratadine (CLARITIN) 10 mg tablet Take 10 mg by mouth daily as needed for allergies.   ? LYRICA 75 mg capsule TAKE 1 CAP BY MOUTH THREE TIMES DAILY   ? metFORMIN (GLUCOPHAGE-XR) 500 MG 24 hr tablet Take 1,000 mg by mouth daily.   ? metoprolol tartrate (LOPRESSOR) 100 MG tablet Take 100 mg by mouth 2 (two) times a day.   ? nitroglycerin (NITROSTAT) 0.4 MG SL tablet Place 0.4 mg under the tongue every 5 (five) minutes as needed for chest pain. 1 tablet SL Q 5 minutes up to 3 doses. Call 999 if chest pain persists.   ? olopatadine 0.2 % Drop Apply 1 drop to eye daily as needed.   ? omeprazole (PRILOSEC) 20 MG capsule Take 20 mg by mouth daily before breakfast.    ? senna-docusate (SENNOSIDES-DOCUSATE SODIUM) 8.6-50 mg tablet Take 2 tablets by mouth 2 (two) times a day as needed for constipation.          ? simethicone (MYLICON) 80 MG chewable tablet Chew 80 mg 4 (four) times a day. After meals and at HS   ? traMADol (ULTRAM) 50 mg tablet Take 1 tablet (50 mg total) by mouth 3 (three) times a day as needed for pain. For pain 6 or higher   ? triamcinolone (KENALOG) 0.1 % ointment Apply 1 application topically daily. Apply to legs in the morning  And to right ear two times a day prn               Vitals:   /78, Temp 98.3, Pulse 72, RR 18, O2 sat 99% RA.      Labs:  Lab Results   Component Value Date    HGBA1C 7.0 (H) 01/10/2019     Lab Results   Component Value Date    HGBA1C 6.9 (H) 03/18/2020     Lab Results   Component Value Date    HGBA1C 6.5 (H) 11/27/2020       Lab Results   Component Value Date    WBC 4.9 11/02/2020    HGB 10.8 (L) 11/02/2020    HCT 32.9 (L) 11/02/2020    MCV 96 11/02/2020     11/02/2020     Results for orders placed or performed in visit on 11/23/20   Basic Metabolic Panel   Result  Value Ref Range    Sodium 137 136 - 145 mmol/L    Potassium 4.1 3.5 - 5.0 mmol/L    Chloride 103 98 - 107 mmol/L    CO2 23 22 - 31 mmol/L    Anion Gap, Calculation 11 5 - 18 mmol/L    Glucose 171 (H) 70 - 125 mg/dL    Calcium 10.0 8.5 - 10.5 mg/dL    BUN 17 8 - 22 mg/dL    Creatinine 0.91 0.60 - 1.10 mg/dL    GFR MDRD Af Amer >60 >60 mL/min/1.73m2    GFR MDRD Non Af Amer >60 >60 mL/min/1.73m2       Lab Results   Component Value Date    TSH 1.04 01/07/2020         Assessment/Plan:  1. IDDM. On insulin toujeo, victoza, and metformin. Continue to follow accuchecks. She sees endocrinology. Last A1c good at 6.5 %.  2. HTN. Stable on current meds metoprolol and chlorthalidone.   3. Neuropathy. Chronic pain controlled with gabapentin, lyrica and Cymbalta. No need for changes.   4. Hx of stroke. Wheelchair bound, non ambulatory. On aspirin.  5. Glaucoma. Visual impairment at baseline.  6. Depression. Continue cymbalta. Moods variable though baseline.  7. CKD. Labs as noted above.  8. Anemia. Last hgb at 10.8, improved from prior. She is no longer on iron.      Phone call duration:  8 minutes      Electronically signed by: Meagan Valdez MD

## 2021-06-21 NOTE — LETTER
Letter by Shawna Mesa CNP at      Author: Shawna Mesa CNP Service: -- Author Type: --    Filed:  Encounter Date: 1/6/2021 Status: (Other)         Patient: Cristal Preciado   MR Number: [id number]   YOB: 1950   Date of Visit: 1/6/2021     [facility]    Facility:   [facility] [[id number]]   Code Status: FULL CODE      CHIEF COMPLAINT/REASON FOR VISIT:  Chief Complaint   Patient presents with   ? FVP Care Coordination - Regulatory       HISTORY:      HPI: Cristal is a 70 y.o. female residing  in the long-term care facility [facility]. She has been a resident here since October 2011. She does have multiple complex co- morbidities. She is treated for hypertension and has insulin-dependent diabetes mellitus. She has a endocrinologist who follows her  blood sugars regularly.  She has chronic neuropathy, chronic kidney disease and is treated with Cymbalta for depression. She is on gabapentin and lyrica for neuropathy pain. She is on meds for depression.  Does require a lift for transfers. She has chronic weakness in her lower extremities.      Today she is seen for a routine  Regulatory visit .  She is with a  hysterectomy October 2019.   She denies chest pain or shortness of breath.  She denies cough or congestion.  She is  non ambulatory, She is on  gabapentin and lyrica for neuropathy.. Her BS are being controlled by her endocrinologist and being sent in every couple of weeks.   She reports a dry cough, denies Congestion. She is having no pain . No open areas on her skin. Her weights were reviewed and she is down 6.5 pounds over the last month     Past Medical History:   Diagnosis Date   ? Acute cystitis with hematuria    ? Allergic rhinitis    ? CAD (coronary artery disease)    ? Carpal tunnel syndrome    ? Cataract    ? Cerebral vascular accident (H)    ? Chronic kidney disease    ? Debility    ? Depression    ? Diabetes mellitus, type II (H)     ? Diabetic nephropathy (H)    ? GERD (gastroesophageal reflux disease)    ? Glaucoma    ? Gout    ? History of pyelonephritis    ? HTN (hypertension)    ? Hyperlipidemia    ? IDDM (insulin dependent diabetes mellitus) (H)     Type 2   ? Lower extremity edema    ? Myocardial infarction (H)              Family History   Problem Relation Age of Onset   ? Hypertension Mother    ? Stroke Mother    ? Cancer Father    ? Glaucoma Father    ? Arthritis Brother    ? Diabetes Sister    ? Glaucoma Sister      Social History     Socioeconomic History   ? Marital status: Single     Spouse name: Not on file   ? Number of children: Not on file   ? Years of education: Not on file   ? Highest education level: Not on file   Occupational History   ? Not on file   Social Needs   ? Financial resource strain: Not on file   ? Food insecurity     Worry: Not on file     Inability: Not on file   ? Transportation needs     Medical: Not on file     Non-medical: Not on file   Tobacco Use   ? Smoking status: Former Smoker   ? Smokeless tobacco: Never Used   Substance and Sexual Activity   ? Alcohol use: No   ? Drug use: No   ? Sexual activity: Not on file   Lifestyle   ? Physical activity     Days per week: Not on file     Minutes per session: Not on file   ? Stress: Not on file   Relationships   ? Social connections     Talks on phone: Not on file     Gets together: Not on file     Attends Mormonism service: Not on file     Active member of club or organization: Not on file     Attends meetings of clubs or organizations: Not on file     Relationship status: Not on file   ? Intimate partner violence     Fear of current or ex partner: Not on file     Emotionally abused: Not on file     Physically abused: Not on file     Forced sexual activity: Not on file   Other Topics Concern   ? Not on file   Social History Narrative    10/13/2015 - The patient lives at [facility] for 5 years.         Review of Systems  Constitutional: Positive  for activity change. Negative for appetite change, chills, fatigue and fever.        Lift for transfers.    HENT: Negative for congestion and sore throat.    Respiratory: Negative for shortness of breath and wheezing.    Cardiovascular: negative for leg swelling . Negative for chest pain. (intermittent Right  upper chest discomfort. She was seen by cardiology and no known cause found. Resident reports relief with Tylenol)       Compression stockings   Gastrointestinal: Negative for abdominal distention, abdominal pain, constipation, diarrhea and nausea.   Genitourinary: Negative for dysuria.   Musculoskeletal: Positive for arthralgias. Negative for myalgias.   Skin: Negative for color change, rash and wound.   Neurological: Positive for numbness. Negative for dizziness and weakness.        Severe neuropathy bilateral hands   Psychiatric/Behavioral: Negative for agitation, behavioral problems and sleep disturbance.   Vitals:    01/06/21 1125   BP: 128/76   Pulse: 74   Resp: 18   Temp: 98  F (36.7  C)   SpO2: 97%   Weight: 173 lb 8 oz (78.7 kg)       Physical Exam    Abdominal: She exhibits distension.  Abdomen is soft  Skin:    healed old lap sites on abdomen     Constitutional: She is oriented to person, place, and time. She appears well-developed and well-nourished.   Pleasant woman in no acute distress.   HENT:   Head: Normocephalic.   Eyes: Conjunctivae are normal.   Neck: Normal range of motion.   Cardiovascular: Normal rate, regular rhythm and normal heart sounds.   No murmur heard.  Pulmonary/Chest: Breath sounds normal. No respiratory distress. She has no wheezes. She has no rales.   Abdominal: Soft. Bowel sounds are normal. She exhibits no distension. There is no tenderness.   Musculoskeletal: She exhibits edema.   Wearing Compression socks   Neurological: She is alert and oriented to person, place, and time.   Neuropathy bilateral hands.   Skin: Skin is warm.   Psychiatric: She has a normal mood and affect.  Her behavior is normal  LABS:   No results found for this or any previous visit (from the past 240 hour(s)).  Case Management:  I have reviewed the facility/SNF care plan/MDS which was done 11/3/21 including the falls risk, nutrition and pain screening. I also reviewed the current immunizations, and preventive care..She is up to date on tests. Patient's desire to return to the community is not present.    Information reviewed:  Medications, vital signs, orders, and nursing notes.    ASSESSMENT:      ICD-10-CM    1. Type 2 diabetes mellitus with hyperglycemia, with long-term current use of insulin (H)  E11.65     Z79.4    2. Essential hypertension  I10    3. Physical deconditioning  R53.81        PLAN:       IDDM. FS are stable last A1C 6.9 on 3/18/20 Endocrinologist following , Continue current diabetic medications as above.       HTN. Continue  Lisinpril and metoprolol-stable     Neuropathy   On lyrica and gabapentin.     CAD. HX  MI and stent.      S/p Hysterectomy in October 2019      Electronically signed by: Shawna Mesa CNP

## 2021-06-21 NOTE — LETTER
Letter by Meagan Valdez MD at      Author: Meagan Valdez MD Service: -- Author Type: --    Filed:  Encounter Date: 3/30/2021 Status: (Other)         Patient: Cristal Preciado   MR Number: [id number]   YOB: 1950   Date of Visit: 3/30/2021             [facility]    Facility:   [facility] [[id number]]   Code Status: FULL CODE       Chief Complaint   Patient presents with   ? Review Of Multiple Medical Conditions     [facility] 3/30/2021. Multiple debilities.        HPI:   Cristal is a 71 y.o. female seen for routine physician follow up in LT at [facility]. She has been a resident here since October 2011. She does have multiple complex co morbidities. She is treated for hypertension and has insulin-dependent diabetes mellitus. She sees an endocrinologist. She has chronic neuropathy, chronic kidney disease and is treated with Cymbalta for depression and chronic pain. She is on gabapentin and lyrica for neuropathy pain. She has chronic weakness in her lower extremities and is wheelchair bound. She has chronic urinary incontinence. Hospitalized in April 2018 for chest pain. It is noted she has coronary artery disease having had PCI with stent placement in 2009. Her chest pain was reproducible on exam. Negative 3 sets of troponin. EKG showed no significant ischemic changes. Pharmacological stress was negative for inducible ischemia so no intervention was required. She has periodic follow up with cardiology. She was admitted to the [facility] on 8/5/19 for planned hysterectomy. She had been experiencing brownish vaginal discharge, referred to gynecology with endometrial biopsy showing atypia. Her hospital course was uneventful, she underwent davinci assisted total laparoscopic hysterectomy with bilateral salping oophorectomy. She returned to [facility] on 8/6/19.      Today:  No interim concerns since my last visit. She continues on insulin,  victoza and toujeo for diabetes, managed by endocrine. She has not been ill recently with cough, cold or congestion. No new concerns per nursing.  She is wheelchair bound, does not ambulate. She needs assist with cares due to neuropathy and difficulty using her hands. BPs satisfactory, treated with metformin and chlorthalidone for HTN. She is on Cymbalta for mood and also chronic pain. She has no current open areas of her skin though noted areas of gluteal folds and coccyx are fragile.       Past Medical History:  Past Medical History:   Diagnosis Date   ? Acute cystitis with hematuria    ? Allergic rhinitis    ? CAD (coronary artery disease)    ? Carpal tunnel syndrome    ? Cataract    ? Cerebral vascular accident (H)    ? Chronic kidney disease    ? Debility    ? Depression    ? Diabetes mellitus, type II (H)    ? Diabetic nephropathy (H)    ? GERD (gastroesophageal reflux disease)    ? Glaucoma    ? Gout    ? History of pyelonephritis    ? HTN (hypertension)    ? Hyperlipidemia    ? IDDM (insulin dependent diabetes mellitus) (H)     Type 2   ? Lower extremity edema    ? Myocardial infarction (H)        Medications:  Current Outpatient Medications   Medication Instructions   ? acetaminophen (TYLENOL) 1,000 mg, Oral, 3 times daily   ? aspirin 81 mg, DAILY   ? atorvastatin (LIPITOR) 80 mg, Bedtime   ? benzocaine-menthoL (CEPACOL) 15-3.6 mg 1 lozenge, Oral, Every 2 hours PRN   ? bisacodyL (DULCOLAX) 10 mg, Rectal, Daily PRN   ? carboxymethylcellulose (REFRESH PLUS) 0.5 % Dpet ophthalmic dropperette 1 drop, Both Eyes, 3 times daily   ? chlorthalidone (HYGROTEN) 25 mg, Oral, DAILY   ? dorzolamide-timolol (COSOPT) 22.3-6.8 mg/mL ophthalmic solution 1 drop, 2 times daily   ? DULoxetine (CYMBALTA) 90 mg, Oral, DAILY   ? folic acid (FOLVITE) 1 mg, DAILY   ? gabapentin (NEURONTIN) 300 mg, Oral, 3 times daily   ? insulin glargine U-300 conc (TOUJEO MAX U-300 SOLOSTAR) 72 Units, Subcutaneous, DAILY, 2 injections of 45 units  from pen   ? ketoconazole (NIZORAL) 2 % shampoo 1 application, Topical, 2 times weekly, Apply to damp skin, lather, leave on 5 minutes, and rinse. Apply on Wednesdays and Saturdays.   ? latanoprost (XALATAN) 0.005 % ophthalmic solution 1 drop, Bedtime   ? liraglutide (VICTOZA) 1.8 mg, DAILY   ? loratadine (CLARITIN) 10 mg, Daily PRN   ? metFORMIN (GLUCOPHAGE-XR) 1,000 mg, Oral, QDaily   ? metoprolol tartrate (LOPRESSOR) 100 mg, Oral, 2 times daily   ? nitroglycerin (NITROSTAT) 0.4 mg, Every 5 min PRN   ? olopatadine 0.2 % Drop 1 drop, Ophthalmic, Daily PRN   ? omeprazole (PRILOSEC) 20 mg, Oral, every morning   ? pregabalin (LYRICA) 75 mg, Oral, 3 times daily   ? senna-docusate (SENNOSIDES-DOCUSATE SODIUM) 8.6-50 mg tablet 2 tablets, Oral, 2 times daily PRN   ? simethicone (MYLICON) 80 mg, Oral, 4 times daily, After meals and at HS    ? triamcinolone (KENALOG) 0.1 % ointment 1 application, Topical, DAILY, Apply to legs in the morning  And to right ear two times a day prn       Physical Exam:   Note: COVID-19 pandemic precautions in place. Physical exam performed with social distancing considerations.  General: Patient is alert female, no distress.   Vitals: /68, Temp 98.3, Pulse 70, RR 18, O2 sat 98% RA.  HEENT: Head is NCAT. Eyes show no injection or icterus. Nares negative. Oropharynx well hydrated.  CV: Non labored respirations.   : Deferred.  Extremities: Mild LE edema is noted.  Musculoskeletal: Deformities small joints hands.  Psych: Mood appears good.      Labs:  Lab Results   Component Value Date    HGBA1C 7.0 (H) 01/10/2019     Lab Results   Component Value Date    HGBA1C 6.9 (H) 03/18/2020     Lab Results   Component Value Date    HGBA1C 6.5 (H) 11/27/2020     Lab Results   Component Value Date    WBC 4.2 02/22/2021    HGB 10.5 (L) 02/22/2021    HCT 33.1 (L) 02/22/2021    MCV 97 02/22/2021     02/22/2021     Results for orders placed or performed in visit on 11/23/20   Basic Metabolic Panel    Result Value Ref Range    Sodium 137 136 - 145 mmol/L    Potassium 4.1 3.5 - 5.0 mmol/L    Chloride 103 98 - 107 mmol/L    CO2 23 22 - 31 mmol/L    Anion Gap, Calculation 11 5 - 18 mmol/L    Glucose 171 (H) 70 - 125 mg/dL    Calcium 10.0 8.5 - 10.5 mg/dL    BUN 17 8 - 22 mg/dL    Creatinine 0.91 0.60 - 1.10 mg/dL    GFR MDRD Af Amer >60 >60 mL/min/1.73m2    GFR MDRD Non Af Amer >60 >60 mL/min/1.73m2     Lab Results   Component Value Date    TSH 1.05 03/08/2021         Assessment/Plan:  1. IDDM. On insulin toujeo, victoza, and metformin. Continue to follow accuchecks. She sees endocrinology. Last A1c good at 6.5%.  2. HTN. She is treated with metoprolol and chlorthalidone. Overall BPs acceptable, no need for changes.   3. Neuropathy. Chronic pain controlled with gabapentin, lyrica and Cymbalta.  4. Hx of stroke. Wheelchair bound, non ambulatory. On aspirin, statin.  5. Glaucoma. Visual impairment at baseline.  6. Depression. Continue Cymbalta.  7. CKD. Labs as noted above.  8. Anemia. Last hgb at 10.5, stable.          Electronically signed by: Meagan Valdez MD

## 2021-06-21 NOTE — LETTER
Letter by Shawna Mesa CNP at      Author: Shawna Mesa CNP Service: -- Author Type: --    Filed:  Encounter Date: 3/3/2021 Status: (Other)         Patient: Cristal Preciado   MR Number: [id number]   YOB: 1950   Date of Visit: 3/3/2021     [facility]       Facility:   [facility] [[id number]]    Code Status: FULL CODE    CHIEF COMPLAINT/REASON FOR VISIT:  Chief Complaint   Patient presents with   ? FVP Care Coordination - Home Visit-Annual Assessment       HPI:    Cristal Preciado is a 70 y.o.  (1950), who is being seen today for an annual comprehensive visit at [facility] [[id number]]   Cristal is a 70 y.o. female residing  in the long-term care facility [facility]. She has been a resident here since October 2011. She does have multiple complex co- morbidities. She is treated for hypertension and has insulin-dependent diabetes mellitus. She has a endocrinologist who follows her  blood sugars regularly.  She has chronic neuropathy, chronic kidney disease and is treated with Cymbalta for depression. She is on gabapentin and lyrica for neuropathy pain. She is on meds for depression.  Does require a lift for transfers. She has chronic weakness in her lower extremities.      Today she is seen for an Annual visit .  She is with a  hysterectomy October 2019.   She denies chest pain or shortness of breath.  She denies cough or congestion.  She is  non ambulatory, She is on  gabapentin and lyrica for neuropathy.. Her BS are being controlled by her endocrinologist and being sent in every couple of weeks.   She  denies cough or  Congestion. She is having no pain . No open areas on her skin. Her weights have ranged from 170-180 pounds over the last 3 months. She is up to date on her mammogram and colonoscopy. She will follow up at the eye doctor next week.  Labs reviewed from 2/22/21 and hgb 10.5 with elevated ferritin at 449.  Will check TSh and LFTS.     LLERGIES: Nuts - unspecified and Unable to assess  PROBLEM LIST:  Patient Active Problem List   Diagnosis   ? Diabetic peripheral neuropathy associated with type 2 diabetes mellitus (H)   ? Type 2 diabetes mellitus with hyperglycemia, with long-term current use of insulin (H)   ? Essential hypertension   ? CKD (chronic kidney disease)   ? CAD (coronary artery disease)   ? Bilateral lower extremity edema   ? Gout   ? Hyperlipidemia   ? Depression   ? Atopic dermatitis, chronic (right ear)   ? Obesity   ? Morbid obesity with BMI of 40.0-44.9, adult (H)   ? Chest pain   ? Acute chest pain   ? Hypomagnesemia   ? Impacted cerumen of right ear     PAST MEDICAL HISTORY:   Past Medical History:   Diagnosis Date   ? Acute cystitis with hematuria    ? Allergic rhinitis    ? CAD (coronary artery disease)    ? Carpal tunnel syndrome    ? Cataract    ? Cerebral vascular accident (H)    ? Chronic kidney disease    ? Debility    ? Depression    ? Diabetes mellitus, type II (H)    ? Diabetic nephropathy (H)    ? GERD (gastroesophageal reflux disease)    ? Glaucoma    ? Gout    ? History of pyelonephritis    ? HTN (hypertension)    ? Hyperlipidemia    ? IDDM (insulin dependent diabetes mellitus) (H)     Type 2   ? Lower extremity edema    ? Myocardial infarction (H)      PAST SURGICAL HISTORY:   Past Surgical History:   Procedure Laterality Date   ? CARDIAC CATHETERIZATION     ? CATARACT EXTRACTION W/ INTRAOCULAR LENS IMPLANT Bilateral    ?  SECTION     ? CORONARY STENT PLACEMENT  2004    RCA   ? TOTAL ABDOMINAL HYSTERECTOMY W/ BILATERAL SALPINGOOPHORECTOMY  2019    Procedure: DaVinci Assisted Total Laparoscopic Hysterectomy, Removal Of Both Tubes And Ovaries; Surgeon: Linh Cardoso MD; Location:  OR       FAMILY HISTORY:   Family History   Problem Relation Age of Onset   ? Hypertension Mother    ? Stroke Mother    ? Cancer Father    ? Glaucoma Father    ? Arthritis Brother     ? Diabetes Sister    ? Glaucoma Sister      SOCIAL HISTORY:   Social History     Socioeconomic History   ? Marital status: Single     Spouse name: Not on file   ? Number of children: Not on file   ? Years of education: Not on file   ? Highest education level: Not on file   Occupational History   ? Not on file   Social Needs   ? Financial resource strain: Not on file   ? Food insecurity     Worry: Not on file     Inability: Not on file   ? Transportation needs     Medical: Not on file     Non-medical: Not on file   Tobacco Use   ? Smoking status: Former Smoker   ? Smokeless tobacco: Never Used   Substance and Sexual Activity   ? Alcohol use: No   ? Drug use: No   ? Sexual activity: Not on file   Lifestyle   ? Physical activity     Days per week: Not on file     Minutes per session: Not on file   ? Stress: Not on file   Relationships   ? Social connections     Talks on phone: Not on file     Gets together: Not on file     Attends Church service: Not on file     Active member of club or organization: Not on file     Attends meetings of clubs or organizations: Not on file     Relationship status: Not on file   ? Intimate partner violence     Fear of current or ex partner: Not on file     Emotionally abused: Not on file     Physically abused: Not on file     Forced sexual activity: Not on file   Other Topics Concern   ? Not on file   Social History Narrative    10/13/2015 - The patient lives at [facility] for [facility] years.     IMMUNIZATIONS:  Immunization History   Administered Date(s) Administered   ? Influenza, inj, historic,unspecified 10/26/2016, 10/12/2017, 10/15/2018, 10/11/2019     Above immunizations pulled from [facility]. Facility records also reconciled. Outstanding information sent to [facility] to update [facility].  Future immunizations are not needed at this point as all recommended immunizations are up to date.   MEDICATIONS:  Current Outpatient Medications   Medication Sig  Dispense Refill   ? acetaminophen (TYLENOL) 500 MG tablet Take 1,000 mg by mouth 3 (three) times a day .           ? aspirin 81 mg chewable tablet Chew 81 mg daily.     ? atorvastatin (LIPITOR) 80 MG tablet Take 80 mg by mouth bedtime.      ? benzocaine-menthoL (CEPACOL) 15-3.6 mg Take 1 lozenge by mouth every 2 (two) hours as needed.     ? bisacodyL (DULCOLAX) 10 mg suppository Insert 10 mg into the rectum daily as needed.     ? carboxymethylcellulose (REFRESH PLUS) 0.5 % Dpet ophthalmic dropperette Administer 1 drop to both eyes 3 (three) times a day.     ? chlorthalidone (HYGROTEN) 25 MG tablet Take 25 mg by mouth daily.      ? dorzolamide-timolol (COSOPT) 22.3-6.8 mg/mL ophthalmic solution Administer 1 drop to both eyes 2 (two) times a day.      ? DULoxetine (CYMBALTA) 60 MG capsule Take 90 mg by mouth daily.      ? folic acid (FOLVITE) 1 MG tablet Take 1 mg by mouth daily.     ? gabapentin (NEURONTIN) 300 MG capsule Take 300 mg by mouth 3 (three) times a day.      ? insulin glargine U-300 conc (TOUJEO MAX U-300 SOLOSTAR) 300 unit/mL (3 mL) InPn Inject 72 Units under the skin daily. 2 injections of 45 units from pen      ? ketoconazole (NIZORAL) 2 % shampoo Apply 1 application topically 2 (two) times a week Apply to damp skin, lather, leave on 5 minutes, and rinse. Apply on Wednesdays and Saturdays..           ? latanoprost (XALATAN) 0.005 % ophthalmic solution Administer 1 drop to both eyes bedtime.      ? liraglutide (VICTOZA) 0.6 mg/0.1 mL (18 mg/3 mL) PnIj injection Inject 1.8 mg under the skin daily. Call [phone] Dr. [place] if she develops nausea lasting >3 days.     ? loratadine (CLARITIN) 10 mg tablet Take 10 mg by mouth daily as needed for allergies.     ? metFORMIN (GLUCOPHAGE-XR) 500 MG 24 hr tablet Take 1,000 mg by mouth daily.     ? metoprolol tartrate (LOPRESSOR) 100 MG tablet Take 100 mg by mouth 2 (two) times a day.     ? nitroglycerin (NITROSTAT) 0.4 MG SL tablet Place 0.4 mg under the  tongue every 5 (five) minutes as needed for chest pain. 1 tablet SL Q 5 minutes up to 3 doses. Call 911 if chest pain persists.     ? olopatadine 0.2 % Drop Apply 1 drop to eye daily as needed.     ? omeprazole (PRILOSEC) 20 MG capsule Take 20 mg by mouth daily before breakfast.      ? pregabalin (LYRICA) 75 MG capsule Take 1 capsule (75 mg total) by mouth 3 (three) times a day. 90 capsule 0   ? senna-docusate (SENNOSIDES-DOCUSATE SODIUM) 8.6-50 mg tablet Take 2 tablets by mouth 2 (two) times a day as needed for constipation.            ? simethicone (MYLICON) 80 MG chewable tablet Chew 80 mg 4 (four) times a day. After meals and at HS     ? triamcinolone (KENALOG) 0.1 % ointment Apply 1 application topically daily. Apply to legs in the morning  And to right ear two times a day prn             No current facility-administered medications for this visit.        Case Management:  I have reviewed the facility/SNF care plan/MDS which was done 2/2/21, including the falls risk, nutrition and pain screening. I also reviewed the current immunizations, and preventive care.. up to date on mammoogram and colonoscopy.   Patient's desire to return to the community is present, but is not able due to care needs .    Advance Directive Discussion:    I reviewed the current advanced directives as reflected in [facility] and the facility chart. I did review the advance directives with the resident.     Team Discussion:  I communicated with the appropriate disciplines involved with the Plan of Care:   Nursing      Patient Goal:  Patient's goal is pain control and comfort.    Information reviewed:  Medications, vital signs, orders, and nursing notes.    ROS:  Constitutional: Positive for activity change. Negative for appetite change, chills, fatigue and fever.        Lift for transfers.    HENT: Negative for congestion and sore throat.    Respiratory: Negative for shortness of breath and wheezing.    Cardiovascular: negative for leg swelling  . Negative for chest pain. (intermittent Right  upper chest discomfort. She was seen by cardiology and no known cause found. Resident reports relief with Tylenol)       Compression stockings   Gastrointestinal: Negative for abdominal distention, abdominal pain, constipation, diarrhea and nausea.   Genitourinary: Negative for dysuria.   Musculoskeletal: Positive for arthralgias. Negative for myalgias.   Skin: Negative for color change, rash and wound.   Neurological: Positive for numbness. Negative for dizziness and weakness.        Severe neuropathy bilateral hands   Psychiatric/Behavioral: Negative for agitation, behavioral problems and sleep disturbance.   Exam:  /58   Pulse 76   Temp 98.3  F (36.8  C)   Resp 18   Wt 181 lb (82.1 kg)   SpO2 96%   BMI 32.06 kg/m     Abdominal: She exhibits distension.  Abdomen is soft  Skin:    healed old lap sites on abdomen     Constitutional: She is oriented to person, place, and time. She appears well-developed and well-nourished.   Pleasant woman in no acute distress.   HENT:   Head: Normocephalic.   Eyes: Conjunctivae are normal.   Neck: Normal range of motion.   Cardiovascular: Normal rate, regular rhythm and normal heart sounds.   No murmur heard.  Pulmonary/Chest: Breath sounds normal. No respiratory distress. She has no wheezes. She has no rales.   Abdominal: Soft. Bowel sounds are normal. She exhibits no distension. There is no tenderness.   Musculoskeletal: She exhibits edema.   Wearing Compression socks   Neurological: She is alert and oriented to person, place, and time.   Neuropathy bilateral hands.   Skin: Skin is warm.   Psychiatric: She has a normal mood and affect. Her behavior is normal  ASSESSMENT/PLAN    ICD-10-CM    1. Diabetic peripheral neuropathy associated with type 2 diabetes mellitus (H)  E11.42    2. Physical deconditioning  R53.81    3. Elevated ferritin level  R79.89        Elevated ferritin level check a TSH and LFTS's .      IDDM. FS  are stable last A1C 6.5 on 11/27/20 Endocrinologist following , Continue current diabetic medications as above.       HTN. Continue  Lisinpril and metoprolol-stable     Neuropathy   On lyrica and gabapentin.     CAD. HX  MI and stent.      S/p Hysterectomy in October 2019    Electronically signed by:  Shawna Mesa CNP

## 2021-06-22 NOTE — PROGRESS NOTES
[facility]    Facility:   [facility] [facility] [[id number]]   Code Status: FULL CODE       Chief Complaint   Patient presents with     Review Of Multiple Medical Conditions     [facility] 11/29/18.       HPI:   Cristal Preciado is a 68 y.o. old female with known coronary artery disease having had PCI with stent placement in 2009, insulin-dependent diabetes, history of CVA with paraplegia and also wheelchair-bound, chronic kidney disease and hypertension who presents for evaluation of chest pain.     The patient was in her usual state of health up until about 9 PM on the day of presentation when she developed acute onset chest pain mostly left-sided with radiation across her chest, which is reproducible on palpation.  Her chest pain was not exertional and nonpleuritic.  At the time of my evaluation her chest pain has improved significant but still present and reproducible on exam.  The patient denies any associated shortness of breath, diaphoresis, orthopnea or lower extremity edema.  She has history of PCI and bare metal stent placement in 2009.  Her last echocardiogram showed normal EF of 65%.  The patient had nuclear stress test last year on May 2017 which was negative for inducible myocardial ischemia.      The patient also endorses nausea, headache but denies any emesis.  She denies any fever, chills or rigors.  No abdominal pain reported. She denies diarrhea, dysuria, hematuria, lower extremity edema, or rash.  Of note the patient is wheelchair-bound at baseline secondary to CVA with residual bilateral lower extremity weakness.      On admission BP is stable. Troponin < 0.01, serum creatinine 1.13.  WBC 4.3, hemoglobin 9, blood glucose 220.  Chest x-ray without any focal airspace disease. EKG without any ischemic changes.    Atypical Chest Pain. Coronary artery disease.  Cristal Preciado is a 69 y/o female with known coronary artery disease s/p PCI with stent placement in 2009 who   presented to [facility]  with sudden onset left sided chest pain occuring rest. Her chest pain was reproducible on exam.  Negative 3 sets of troponin. EKG showed no significant ischemic changes. Pharmacological stress was negative for inducible ischemia  - Continue beta-blocker, aspirin, atorvastatin.        Severe hypomagnesemia of 1.1  - replaced, recheck of 1.9 on the day of discharge  - recheck at the nursing home in 1-2 days     Insulin-dependent diabetes  Last A1c was 7.9, on admission blood glucose in the 200 range.    - c/w Lantus 75  BID and victoza  - sliding scale insulin hypoglycemia protocol.   - PTA Novolog dosing were significantly higher than what she was getting during hospitalization and will need further adjustment       History of CVA with residual weakness.   - History of remote CVA with residual weakness and is now wheelchair bound.   - Physical therapy to assess mobility and determine needs with transfers. Fall Precautions.      Essential hypertension.    - continue lisinopril and  chlorthalidone.      Diabetic peripheral neuropathy.   - Continue gabapentin and Lyrica.      Mixed hyperlipidemia.   - Continue atorvastatin.      Macrocytic anemia.    - Hemoglobin stable at 9, continue to monitor Hgb.      GERD without esophagitis  - On ranitidine.      Depression and Anxiety  - Continue Cymbalta.      Glaucoma  -  Continue ophthalmic drops.        Past Medical History:  Past Medical History:   Diagnosis Date     Allergic rhinitis      CAD (coronary artery disease)      Carpal tunnel syndrome      Cataract      Cerebral vascular accident (H)      Chronic kidney disease      Debility      Depression      Diabetes mellitus, type II (H)      Diabetic nephropathy (H)      Diabetic neuropathy (H)      GERD (gastroesophageal reflux disease)      Glaucoma      Gout      History of pyelonephritis      HTN (hypertension)      Hyperlipidemia      IDDM (insulin dependent diabetes mellitus) (H)     Type 2      Lower extremity edema      Myocardial infarction        Medications:  Current Outpatient Medications   Medication Sig     acetaminophen (TYLENOL) 500 MG tablet Take 1,000 mg by mouth daily as needed. Give at bedtime; also has additional pm orders. For hand pain. Don't exceed 3000mg/24hrs     aspirin 81 mg chewable tablet Chew 81 mg daily.     atorvastatin (LIPITOR) 80 MG tablet Take 80 mg by mouth bedtime.      calcium carbonate-vit D3-min 600 mg calcium- 400 unit Tab Take 1 tablet by mouth 2 (two) times a day.      chlorthalidone (HYGROTEN) 25 MG tablet Take 25 mg by mouth daily.      dorzolamide-timolol (COSOPT) 22.3-6.8 mg/mL ophthalmic solution Administer 1 drop to both eyes 2 (two) times a day.      DULoxetine (CYMBALTA) 60 MG capsule Take 60 mg by mouth daily.     ferrous sulfate 325 (65 FE) MG tablet Take 1 tablet by mouth daily with breakfast.     folic acid (FOLVITE) 1 MG tablet Take 1 mg by mouth daily.     gabapentin (NEURONTIN) 300 MG capsule Take 900 mg by mouth 3 (three) times a day.     insulin aspart U-100 (NOVOLOG) 100 unit/mL injection pen Inject 76-96 Units under the skin 3 (three) times a day before meals. Please see MAR     insulin aspart U-100 (NOVOLOG) 100 unit/mL injection Inject under the skin see administration instructions. Give three times a day before meals using sliding scale. 201-249= 2 units, 250-299= 4 units, 300-349= 6 units, 350-399= 8 units, 400-450= 10 units, and 451+ = 12 units. Goal is to eventually get rid of sliding scale.     ketoconazole (NIZORAL) 2 % shampoo Apply 1 application topically 2 (two) times a week. Apply to damp skin, lather, leave on 5 minutes, and rinse. Apply on Wednesdays and Sundays.     LANTUS SOLOSTAR U-100 INSULIN 100 unit/mL (3 mL) pen Inject 75 Units under the skin 2 (two) times a day. 11.65 Type 2 with hyperglycemia  Contact provider if insulin prescribed is not the preferred insulin per insurance. (Patient taking differently: Inject 100 Units under  "the skin 2 (two) times a day. 11.65 Type 2 with hyperglycemia  Contact provider if insulin prescribed is not the preferred insulin per insurance.)     latanoprost (XALATAN) 0.005 % ophthalmic solution Administer 1 drop to both eyes bedtime.      liraglutide (VICTOZA) 0.6 mg/0.1 mL (18 mg/3 mL) PnIj injection Inject 1.8 mg under the skin daily. Call [phone] Dr. Washington if she develops nausea lasting >3 days.     lisinopril (PRINIVIL,ZESTRIL) 40 MG tablet Take 40 mg by mouth daily. Hold for SBP <110     loratadine (CLARITIN) 10 mg tablet Take 10 mg by mouth daily as needed for allergies.     metoprolol tartrate (LOPRESSOR) 100 MG tablet Take 100 mg by mouth 2 (two) times a day.     mineral oil Drop Administer 4 drops into both ears 2 (two) times a day as needed. Every day shift, apply on Q-tip and rub into ears.      nitroglycerin (NITROSTAT) 0.4 MG SL tablet Place 0.4 mg under the tongue every 5 (five) minutes as needed for chest pain. 1 tablet SL Q 5 minutes up to 3 doses. Call [phone] if chest pain persists.     olopatadine 0.2 % Drop Apply 1 drop to eye daily as needed.     pen needle,diabetic dual safty (BD AUTOSHIELD DUO PEN NEEDLE) 30 gauge x 3/16\" Ndle Inject four times a day. Dispense per Ins coverage.     pimecrolimus (ELIDEL) 1 % cream Apply 1 application topically 2 (two) times a day as needed. Apply to rash on both ears     pregabalin (LYRICA) 75 MG capsule Take 75 mg by mouth 3 (three) times a day.     ranitidine (ZANTAC) 150 MG tablet Take 150 mg by mouth once daily.      senna-docusate (SENNOSIDES-DOCUSATE SODIUM) 8.6-50 mg tablet Take 1 tablet by mouth daily as needed for constipation.     traMADol (ULTRAM) 50 mg tablet Take 1 tablet (50 mg total) by mouth 3 (three) times a day as needed for pain. For pain 6 or higher     triamcinolone (KENALOG) 0.1 % ointment Apply 1 application topically daily. Apply to legs in the morning  And to right ear prn BID         Physical Exam:   General: Patient is alert " female, no distress.   HEENT: Head is NCAT. Eyes show no injection or icterus. Nares negative. Oropharynx well hydrated.  Neck: Supple. No tenderness or adenopathy. No JVD.  Lungs: Clear bilaterally. No wheezes.  Cardiovascular: Regular rate and rhythm, normal S1, S2.  Back: No spinal or CVA tenderness.  Abdomen: Soft, no tenderness on exam. Bowel sounds present. No guarding rebound or rigidity.  : Deferred.  Extremities: LE edema is noted.  Musculoskeletal: Degen changes.  Psych: Mood appears good.      Labs:  Results for orders placed or performed during the hospital encounter of 04/23/18   Basic Metabolic Panel   Result Value Ref Range    Sodium 138 136 - 145 mmol/L    Potassium 4.1 3.5 - 5.0 mmol/L    Chloride 108 (H) 98 - 107 mmol/L    CO2 19 (L) 22 - 31 mmol/L    Anion Gap, Calculation 11 5 - 18 mmol/L    Glucose 217 (H) 70 - 125 mg/dL    Calcium 9.4 8.5 - 10.5 mg/dL    BUN 15 8 - 22 mg/dL    Creatinine 0.92 0.60 - 1.10 mg/dL    GFR MDRD Af Amer >60 >60 mL/min/1.73m2    GFR MDRD Non Af Amer >60 >60 mL/min/1.73m2       Lab Results   Component Value Date    WBC 4.3 04/24/2018    HGB 9.4 (L) 04/24/2018    HCT 28.6 (L) 04/24/2018     (H) 04/24/2018     04/26/2018         Assessment/Plan:  1. Diabetes. On metformin and insulin. Continue to follow accuchecks.  2. HTN. BPs satisfactory on current regimen.  3. Neuropathy. Chronic pain controlled with gabapentin, Cymbalta.  4. Hx of stroke. Wheelchair bound. Does not ambulate.  5. Glaucoma. visual impairment.          Electronically signed by: Meagan Valdez MD

## 2021-06-22 NOTE — PROGRESS NOTES
[facility]    Facility:   [facility] [[id number]]   Code Status: FULL CODE      CHIEF COMPLAINT/REASON FOR VISIT:  Chief Complaint   Patient presents with     Review Of Multiple Medical Conditions       HISTORY:      HPI: Cristal is a 68 y.o. female  being seen for routine visit in the long-term care facility [facility]. She has been a resident here since October 2011. She does have multiple complex co morbidities. She is treated for hypertension and has insulin-dependent diabetes mellitus. She has a endocrinologist who is following her blood sugars. Nursing is sending weekly checks to her endocrinologist.    She has chronic neuropathy, chronic kidney disease and is treated with Cymbalta for depression. She is on gabapentin and lyrica for neuropathy pain. She is on meds for depression.  Does require a lift for transfers. She has chronic weakness in her lower extremities.     Today she isseen in her room.She is pleasant and compliant with exam.  She denies chest pain or shortness of breath.  She denies cough or congestion. Her BS have been labile and endocrinology is following her.  She is  non ambulatory, has impaired mobility of both hands but does report relief with gabapentin and lyrica. Her only report today is she mentions a sore throat and difficulty swallowing her food for the last month. Staff has not noticed any eating concerns. I will have speech evaluate her She will be seeing a dentist this week due to a missing tooth on he bottom denture and needing some shaving down on her upper dentures. Her weights were reviewed and she is down 7 pounds over the last month. Last A1C 8.3- 9/2018.    Past Medical History:   Diagnosis Date     Allergic rhinitis      CAD (coronary artery disease)      Carpal tunnel syndrome      Cataract      Cerebral vascular accident (H)      Chronic kidney disease      Debility      Depression      Diabetes mellitus, type II  (H)      Diabetic nephropathy (H)      Diabetic neuropathy (H)      GERD (gastroesophageal reflux disease)      Glaucoma      Gout      History of pyelonephritis      HTN (hypertension)      Hyperlipidemia      IDDM (insulin dependent diabetes mellitus) (H)     Type 2     Lower extremity edema      Myocardial infarction              Family History   Problem Relation Age of Onset     Hypertension Mother      Stroke Mother      Cancer Father      Arthritis Brother      Social History     Socioeconomic History     Marital status: Single     Spouse name: Not on file     Number of children: Not on file     Years of education: Not on file     Highest education level: Not on file   Social Needs     Financial resource strain: Not on file     Food insecurity - worry: Not on file     Food insecurity - inability: Not on file     Transportation needs - medical: Not on file     Transportation needs - non-medical: Not on file   Occupational History     Not on file   Tobacco Use     Smoking status: Former Smoker   Substance and Sexual Activity     Alcohol use: No     Drug use: No     Sexual activity: Not on file   Other Topics Concern     Not on file   Social History Narrative    10/13/2015 - The patient lives at [facility] for 5 years.         Review of Systems   Constitutional: Positive for activity change. Negative for appetite change, chills, fatigue and fever.        Lift for transfers.    HENT: Negative for congestion and sore throat.    Respiratory: Negative for shortness of breath and wheezing.    Cardiovascular: Positive for leg swelling. Negative for chest pain.        Compression stockings   Gastrointestinal: Negative for abdominal distention, abdominal pain, constipation, diarrhea and nausea.   Genitourinary: Negative for dysuria.   Musculoskeletal: Positive for arthralgias. Negative for myalgias.   Skin: Negative for color change, rash and wound.   Neurological: Positive for numbness. Negative for dizziness and  weakness.        Severe neuropathy bilateral hands   Psychiatric/Behavioral: Negative for agitation, behavioral problems and sleep disturbance.       .  Vitals:    01/09/19 0831   BP: 147/72   Pulse: 88   Resp: 18   Temp: 97.6  F (36.4  C)   SpO2: 96%   Weight: (!) 233 lb 8 oz (105.9 kg)       Physical Exam   Constitutional: She is oriented to person, place, and time. She appears well-developed and well-nourished.   Pleasant woman in no acute distress.   HENT:   Head: Normocephalic.   Eyes: Conjunctivae are normal.   Neck: Normal range of motion.   Cardiovascular: Normal rate, regular rhythm and normal heart sounds.   No murmur heard.  Pulmonary/Chest: Breath sounds normal. No respiratory distress. She has no wheezes. She has no rales.   Abdominal: Soft. Bowel sounds are normal. She exhibits no distension. There is no tenderness.   Musculoskeletal: She exhibits edema.   Wearing Compression socks   Neurological: She is alert and oriented to person, place, and time.   Neuropathy bilateral hands.   Skin: Skin is warm.   Psychiatric: She has a normal mood and affect. Her behavior is normal.         LABS:    No results found for this or any previous visit (from the past 240 hour(s)).Aic 9/7/18 8.3  AiC 4/24/18 was 7.7 which is down from 7.9  9/13/17  BMP  BUN 28  Creatinine 1.20  GFR 55    Current Outpatient Medications   Medication Sig     acetaminophen (TYLENOL) 500 MG tablet Take 1,000 mg by mouth 3 (three) times a day .           aspirin 81 mg chewable tablet Chew 81 mg daily.     atorvastatin (LIPITOR) 80 MG tablet Take 80 mg by mouth bedtime.      calcium carbonate-vit D3-min 600 mg calcium- 400 unit Tab Take 1 tablet by mouth 2 (two) times a day.      chlorthalidone (HYGROTEN) 25 MG tablet Take 25 mg by mouth daily.      dorzolamide-timolol (COSOPT) 22.3-6.8 mg/mL ophthalmic solution Administer 1 drop to both eyes 2 (two) times a day.      DULoxetine (CYMBALTA) 60 MG capsule Take 60 mg by mouth daily.     ferrous  sulfate 325 (65 FE) MG tablet Take 1 tablet by mouth daily with breakfast.     folic acid (FOLVITE) 1 MG tablet Take 1 mg by mouth daily.     gabapentin (NEURONTIN) 300 MG capsule Take 900 mg by mouth 3 (three) times a day.     insulin regular hum U-500 conc, HumuLIN R, (HUMULIN R CONC) 500 unit/mL CONCENTRATED injection Inject 60 Units under the skin 3 (three) times a day Call if readings <70 or >350 .     ketoconazole (NIZORAL) 2 % shampoo Apply 1 application topically 2 (two) times a week Apply to damp skin, lather, leave on 5 minutes, and rinse. Apply on Wednesdays and Saturdays..           latanoprost (XALATAN) 0.005 % ophthalmic solution Administer 1 drop to both eyes bedtime.      liraglutide (VICTOZA) 0.6 mg/0.1 mL (18 mg/3 mL) PnIj injection Inject 1.8 mg under the skin daily. Call [phone] Dr. Washington if she develops nausea lasting >3 days.     lisinopril (PRINIVIL,ZESTRIL) 40 MG tablet Take 40 mg by mouth daily. Hold for SBP <110     loratadine (CLARITIN) 10 mg tablet Take 10 mg by mouth daily as needed for allergies.     metFORMIN (GLUCOPHAGE-XR) 500 MG 24 hr tablet Take 1,000 mg by mouth daily with breakfast.     metoprolol tartrate (LOPRESSOR) 100 MG tablet Take 100 mg by mouth 2 (two) times a day.     mineral oil Drop Administer 3 drops into both ears daily as needed Every day shift, apply on Q-tip and rub into ears. .           nitroglycerin (NITROSTAT) 0.4 MG SL tablet Place 0.4 mg under the tongue every 5 (five) minutes as needed for chest pain. 1 tablet SL Q 5 minutes up to 3 doses. Call [phone] if chest pain persists.     olopatadine 0.2 % Drop Apply 1 drop to eye daily as needed.     pregabalin (LYRICA) 75 MG capsule Take 75 mg by mouth 3 (three) times a day.     ranitidine (ZANTAC) 150 MG tablet Take 150 mg by mouth once daily.      senna-docusate (SENNOSIDES-DOCUSATE SODIUM) 8.6-50 mg tablet Take 1 tablet by mouth daily as needed for constipation.     traMADol (ULTRAM) 50 mg tablet Take 1  tablet (50 mg total) by mouth 3 (three) times a day as needed for pain. For pain 6 or higher     triamcinolone (KENALOG) 0.1 % ointment Apply 1 application topically daily. Apply to legs in the morning  And to right ear prn BID       ASSESSMENT:      ICD-10-CM    1. IDDM (insulin dependent diabetes mellitus) (H) E11.9     Z79.4    2. Essential hypertension I10    3. Other chronic pain G89.29    4. Neuropathy (H) G62.9        PLAN:    IDDM. FS are labile, she is on high doses of  meal coverage. Endocrinologist following , A1C ordered.   HTN. Continue  Lisinpril and metoprolol  Neuropathy.mainly  hands with reports of feet also . On lyrica and gabapentin.  CAD. HX  MI and stent.   Difficulty swallowing - speech to evaluate.        Electronically signed by: Shawna Mesa CNP

## 2021-06-24 NOTE — PROGRESS NOTES
[facility]    Facility:   [facility] [[id number]]   Code Status: FULL CODE      CHIEF COMPLAINT/REASON FOR VISIT:  Chief Complaint   Patient presents with     Problem Visit     heel blister/pressure ulcer       HISTORY:      HPI: Cristal is a 68 y.o. female  being seen for routine visit in the long-term care facility [facility]. She has been a resident here since October 2011. She does have multiple complex co morbidities. She is treated for hypertension and has insulin-dependent diabetes mellitus. She has a endocrinologist who is following her blood sugars. Nursing is sending weekly checks to her endocrinologist.    She has chronic neuropathy, chronic kidney disease and is treated with Cymbalta for depression. She is on gabapentin and lyrica for neuropathy pain. She is on meds for depression.  Does require a lift for transfers. She has chronic weakness in her lower extremities.     Today she isseen in her room to evaluate a blister verses pressure ulcer medial right heel. She is noted to have a dark spot medial heel. It it difficult to determine whether it was a blood blister or an unstageable pressure ulcer. She has had it for months and it is intact. She has no pain and the site is not boggy or soft.   She denies chest pain or shortness of breath.  She denies cough or congestion.   She is  non ambulatory, has impaired mobility of both hands but does report relief with gabapentin and lyrica.       Past Medical History:   Diagnosis Date     Allergic rhinitis      CAD (coronary artery disease)      Carpal tunnel syndrome      Cataract      Cerebral vascular accident (H)      Chronic kidney disease      Debility      Depression      Diabetes mellitus, type II (H)      Diabetic nephropathy (H)      Diabetic neuropathy (H)      GERD (gastroesophageal reflux disease)      Glaucoma      Gout      History of pyelonephritis      HTN (hypertension)       Hyperlipidemia      IDDM (insulin dependent diabetes mellitus) (H)     Type 2     Lower extremity edema      Myocardial infarction              Family History   Problem Relation Age of Onset     Hypertension Mother      Stroke Mother      Cancer Father      Arthritis Brother      Social History     Socioeconomic History     Marital status: Single     Spouse name: Not on file     Number of children: Not on file     Years of education: Not on file     Highest education level: Not on file   Occupational History     Not on file   Social Needs     Financial resource strain: Not on file     Food insecurity:     Worry: Not on file     Inability: Not on file     Transportation needs:     Medical: Not on file     Non-medical: Not on file   Tobacco Use     Smoking status: Former Smoker   Substance and Sexual Activity     Alcohol use: No     Drug use: No     Sexual activity: Not on file   Lifestyle     Physical activity:     Days per week: Not on file     Minutes per session: Not on file     Stress: Not on file   Relationships     Social connections:     Talks on phone: Not on file     Gets together: Not on file     Attends Hinduism service: Not on file     Active member of club or organization: Not on file     Attends meetings of clubs or organizations: Not on file     Relationship status: Not on file     Intimate partner violence:     Fear of current or ex partner: Not on file     Emotionally abused: Not on file     Physically abused: Not on file     Forced sexual activity: Not on file   Other Topics Concern     Not on file   Social History Narrative    10/13/2015 - The patient [facility] at [facility].         Review of Systems   Constitutional: Positive for activity change. Negative for appetite change, chills, fatigue and fever.        Lift for transfers.    HENT: Negative for congestion and sore throat.    Respiratory: Negative for shortness of breath and wheezing.    Cardiovascular: Positive for leg  swelling. Negative for chest pain.        Compression stockings   Gastrointestinal: Negative for abdominal distention, abdominal pain, constipation, diarrhea and nausea.   Genitourinary: Negative for dysuria.   Musculoskeletal: Positive for arthralgias. Negative for myalgias.   Skin: Negative for color change, rash and wound.   Neurological: Positive for numbness. Negative for dizziness and weakness.        Severe neuropathy bilateral hands   Psychiatric/Behavioral: Negative for agitation, behavioral problems and sleep disturbance.       .  Vitals:    02/22/19 2133   BP: 115/44   Pulse: 78   Resp: 20   Temp: 98  F (36.7  C)   SpO2: 94%   Weight: (!) 229 lb (103.9 kg)       Physical Exam   Constitutional: She is oriented to person, place, and time. She appears well-developed and well-nourished.   Pleasant woman in no acute distress.   HENT:   Head: Normocephalic.   Eyes: Conjunctivae are normal.   Neck: Normal range of motion.   Cardiovascular: Normal rate, regular rhythm and normal heart sounds.   No murmur heard.  Pulmonary/Chest: Breath sounds normal. No respiratory distress. She has no wheezes. She has no rales.   Abdominal: Soft. Bowel sounds are normal. She exhibits no distension. There is no tenderness.   Musculoskeletal: She exhibits edema.   Wearing Compression socks   Neurological: She is alert and oriented to person, place, and time.   Neuropathy bilateral hands.   Skin: Skin is warm.   Small purple area medial right heel    Psychiatric: She has a normal mood and affect. Her behavior is normal.         LABS:    No results found for this or any previous visit (from the past 240 hour(s)).Aic 9/7/18 8.3  AiC 4/24/18 was 7.7 which is down from 7.9  9/13/17  BMP  BUN 28  Creatinine 1.20  GFR 55    Current Outpatient Medications   Medication Sig     acetaminophen (TYLENOL) 500 MG tablet Take 1,000 mg by mouth 3 (three) times a day .           aspirin 81 mg chewable tablet Chew 81 mg daily.     atorvastatin  (LIPITOR) 80 MG tablet Take 80 mg by mouth bedtime.      calcium carbonate-vit D3-min 600 mg calcium- 400 unit Tab Take 1 tablet by mouth 2 (two) times a day.      chlorthalidone (HYGROTEN) 25 MG tablet Take 25 mg by mouth daily.      dorzolamide-timolol (COSOPT) 22.3-6.8 mg/mL ophthalmic solution Administer 1 drop to both eyes 2 (two) times a day.      DULoxetine (CYMBALTA) 60 MG capsule Take 60 mg by mouth daily.     ferrous sulfate 325 (65 FE) MG tablet Take 1 tablet by mouth daily with breakfast.     folic acid (FOLVITE) 1 MG tablet Take 1 mg by mouth daily.     gabapentin (NEURONTIN) 300 MG capsule Take 900 mg by mouth 3 (three) times a day.     insulin regular hum U-500 conc, HumuLIN R, (HUMULIN R CONC) 500 unit/mL CONCENTRATED injection Inject 60 Units under the skin 3 (three) times a day Call if readings <70 or >350 .     ketoconazole (NIZORAL) 2 % shampoo Apply 1 application topically 2 (two) times a week Apply to damp skin, lather, leave on 5 minutes, and rinse. Apply on Wednesdays and Saturdays..           latanoprost (XALATAN) 0.005 % ophthalmic solution Administer 1 drop to both eyes bedtime.      liraglutide (VICTOZA) 0.6 mg/0.1 mL (18 mg/3 mL) PnIj injection Inject 1.8 mg under the skin daily. Call [phone] Dr. Washington if she develops nausea lasting >3 days.     lisinopril (PRINIVIL,ZESTRIL) 40 MG tablet Take 40 mg by mouth daily. Hold for SBP <110     loratadine (CLARITIN) 10 mg tablet Take 10 mg by mouth daily as needed for allergies.     metFORMIN (GLUCOPHAGE-XR) 500 MG 24 hr tablet Take 1,000 mg by mouth daily with breakfast.     metoprolol tartrate (LOPRESSOR) 100 MG tablet Take 100 mg by mouth 2 (two) times a day.     mineral oil Drop Administer 3 drops into both ears daily as needed Every day shift, apply on Q-tip and rub into ears. .           nitroglycerin (NITROSTAT) 0.4 MG SL tablet Place 0.4 mg under the tongue every 5 (five) minutes as needed for chest pain. 1 tablet SL Q 5 minutes up  to 3 doses. Call 911 if chest pain persists.     olopatadine 0.2 % Drop Apply 1 drop to eye daily as needed.     pregabalin (LYRICA) 75 MG capsule Take 75 mg by mouth 3 (three) times a day.     ranitidine (ZANTAC) 150 MG tablet Take 150 mg by mouth once daily.      senna-docusate (SENNOSIDES-DOCUSATE SODIUM) 8.6-50 mg tablet Take 1 tablet by mouth daily as needed for constipation.     traMADol (ULTRAM) 50 mg tablet Take 1 tablet (50 mg total) by mouth 3 (three) times a day as needed for pain. For pain 6 or higher     triamcinolone (KENALOG) 0.1 % ointment Apply 1 application topically daily. Apply to legs in the morning  And to right ear prn BID       ASSESSMENT:      ICD-10-CM    1. Blister of right heel, initial encounter S90.821A        PLAN:   Small darkened area medial right heel, diffu=icult to determine if blood blister/unstageable pressure sore, float heels while in bed, skin prep daily and monitor for increased changes in the heel.   IDDM. FS are labile, she is on high doses of  meal coverage. Endocrinologist following ,    HTN. Continue  Lisinpril and metoprolol  Neuropathy.mainly  hands with reports of feet also . On lyrica and gabapentin.  CAD. HX  MI and stent.           Electronically signed by: Shawna Mesa CNP

## 2021-06-25 NOTE — TELEPHONE ENCOUNTER
[facility] Nurse Triage Telephone Note      Provider: DANYELLE Wesley  Facility: [facility] Type: LTC    Caller: Rosalva  Call Back Number:  [phone]      Allergies: Nuts - unspecified and Unable to assess    Reason for call: C/O burning x 1 last week, enc fluids. No further C/O until today when cleaning her smelly wheelchair.    Verbal Order/Direction given by Provider: Str cath UA cond UC.    Provider giving order: DANYELLE Wesley    Verbal order given to: Alicia Reis RN

## 2021-06-25 NOTE — TELEPHONE ENCOUNTER
[facility] Nurse Triage Telephone Note      Provider: DANYELLE Wesley  Facility: [facility] Type: LTC    Caller: Rosalva   Call Back Number:  [phone]    Allergies: Nuts - unspecified and Unable to assess    Reason for call: Pt has an order for TMC cream to her LE [facility] for dry black skin. Nursing reports that she is not having the dry skin and using Eucerin at this time.      Verbal Order/Direction given by Provider: discontinue TMC cream    Provider giving order: DANYELLE Wesley    Verbal order given to: Rosalva Watson RN

## 2021-06-25 NOTE — PROGRESS NOTES
Progress Notes by Solomon Beaver MD at 5/5/2017  9:10 AM     Author: Solomon Beaver MD Service: -- Author Type: Physician    Filed: 5/5/2017  9:36 AM Encounter Date: 5/5/2017 Status: Signed    : Solomon Beaver MD (Physician)        `        [email or web address]            Thank you Dr. Vivas for asking the [facility] team to participate in the care of your patient, Cristal Preciado.     Impression and Plan     1. Coronary artery disease. Cristal has known coronary artery disease having had PCI with stent placement to the ostial right coronary artery 19 March 2009 (2.5×15 mm bare metal stent).    Patient now reports some intermittent chest discomfort as described below in History of Present Illness.  Certain features do sound somewhat atypical.  She has minimal associated symptoms.  She states it is unlike her angina that she had in 2009 and night when she had her stents placed.  In addition, although she is fairly confined to a wheelchair, she does upper body mild isometric exercises and this has not provoked her symptoms.  Nonetheless, I cannot rule out possible ischemic contribution to some of her symptoms.  Plan:    Regadenoson nuclear perfusion study.    2. Hypertension.  Blood pressure is reasonable the office today at 124/68 mmHg.    3. Dyslipidemia.  Relatively recent direct LDL 21 October 2016 was favorable at 42 mg/dL.    Ultimately, further recommendations and follow-up pending regadenoson nuclear perfusion study results.         History of Present Illness    Once again I would like to thank you again for asking me to participate in the care of your patient, Cristal Preciado.  As you know, but to reiterate for my own records, Cristal Preciado is a 67 y.o. female with known coronary artery disease having had PCI with stent placement to the ostial right coronary artery 19 March 2009 (2.5×15 mm bare metal stent). Historically, she has had well-preserved LV  systolic performance with nuclear imaging 13 January 2011 revealing ejection fraction of 64%.    On interview, Jerzy states that she has noticed some intermittent chest discomfort.  Specifically, she states she had an episode of chest discomfort on 24 April 2017 and then again on 26 April 2017.  She describes it as something of a pressure in the left chest region that radiates upward toward the right shoulder.  At times it has something of a more sharp characteristic.  She states she did not have significant associated shortness of breath.  For the first episode, she did receive some Tylenol which she felt helped.  On the second episode she did receive nitroglycerin which seemed to ameliorate her discomfort.  Her discomfort is not made worse by positioning.  She indicates to me that she does exercise on a regular basis in her wheelchair.  Specifically, she reports that she uses weights and does of her extremity isometric exercises and the like though this has not provoked her aforementioned symptoms.  She does indicate that the current discomfort is dissimilar from when she had her stents placed in 2009.  She did indicate to me that on 26 April when she had her discomfort that she had a subjective mild fever at that time as well.    Further review of systems is otherwise negative/noncontributory (medical record and 13 point review of systems reviewed as well and pertinent positives noted).         Cardiac Diagnostics     Echocardiogram 17 July 2010:  1. Normal left ventricular size and systolic performance.  2. Mild concentric increase in left ventricular wall thickness.  3. No significant valvular heart disease.  4. Normal right ventricular size and systolic performance.  5. Normal atrial dimensions.    Regadenoson nuclear perfusion study 13 January 2011:  1. No evidence of infarct or ischemia.  2. Normal left ventricular systolic performance with ejection fraction of 64%.    Coronary angiography 19 March  "2009:  1. Left main coronary artery: Normal.  2. Left anterior descending coronary artery: Diffuse moderate disease.  3. Circumflex coronary artery: Diffuse moderate disease throughout.  Small nondominant circumflex without focal severe stenosis.  4. Right coronary artery (dominant): Critical proximal lesion with thrombus.  Moderate disease in more distal right coronary artery.  5. Status post PCI with stent placement to the ostium of right coronary artery (2.5×15 mm bare metal stent).           Physical Examination       /68 (Patient Site: Right Arm, Patient Position: Sitting, Cuff Size: Adult Regular) Comment: forearm BP  Pulse 74  Resp 18  Ht 5' 3\" (1.6 m)  Wt (!) 234 lb (106.1 kg)  BMI 41.45 kg/m2        Wt Readings from Last 3 Encounters:   05/05/17 (!) 234 lb (106.1 kg)   03/27/17 (!) 233 lb (105.7 kg)   02/28/17 (!) 232 lb 12.8 oz (105.6 kg)       The patient is alert and oriented times three. Sclerae are anicteric. Mucosal membranes are moist. Jugular venous pressure is normal. No significant adenopathy/thyromegally appreciated. Lungs are clear with good expansion. On cardiovascular exam, the patient has a regular S1 and S2. Abdomen is soft and non-tender. Extremities reveal no clubbing, cyanosis, though there is mild LE edema.         Family History/Social History/Risk Factors   Patient does not smoke.  Family history of hypertension.       Medications  Allergies   Current Outpatient Prescriptions   Medication Sig Dispense Refill   ? acetaminophen (TYLENOL) 500 MG tablet Take 1,000 mg by mouth bedtime. Give at bedtime; also has additional pm orders. For hand pain. Don't exceed 3000mg/24hrs     ? acetaminophen (TYLENOL) 500 MG tablet Take 1,000 mg by mouth daily. Don't exceed 3000mg/24hrs     ? acetaminophen (TYLENOL) 500 MG tablet Take 500 mg by mouth every 8 (eight) hours as needed for pain. For pain level 1-5. Don't exceed 3000mg/24hrs     ? acetaminophen (TYLENOL) 500 MG tablet Take 1,000 mg " by mouth every 6 (six) hours as needed for pain. For pain 6-10. Don't exceed 3000mg/24hrs     ? allopurinol (ZYLOPRIM) 100 MG tablet Take 100 mg by mouth daily.      ? ammonium lactate (AMLACTIN) 12 % cream Apply 1 application topically as needed for dry skin. Apply to feet     ? artificial tears,hypromellose, (GENTEAL) 0.3 % Gel Administer 0.25 inches to both eyes every hour as needed.     ? aspirin 81 mg chewable tablet Chew 81 mg daily.     ? atorvastatin (LIPITOR) 80 MG tablet Take 80 mg by mouth bedtime.      ? calcium carbonate-vit D3-min 600 mg calcium- 400 unit Tab Take 1 tablet by mouth 2 (two) times a day.      ? chlorthalidone (HYGROTEN) 25 MG tablet Take 12.5 mg by mouth daily.      ? dorzolamide-timolol (COSOPT) 22.3-6.8 mg/mL ophthalmic solution Administer 1 drop to both eyes 2 (two) times a day.      ? DULoxetine (CYMBALTA) 30 MG capsule Take 30 mg by mouth daily.     ? ferrous sulfate 325 (65 FE) MG tablet Take 1 tablet by mouth daily with breakfast.     ? folic acid (FOLVITE) 1 MG tablet Take 1 mg by mouth daily.     ? gabapentin (NEURONTIN) 300 MG capsule Take 1,000 mg by mouth 3 (three) times a day.      ? insulin aspart (NOVOLOG) 100 unit/mL injection Inject 15 Units under the skin as needed. With each snack in addition to sliding scale and lantus     ? insulin aspart (NOVOLOG) 100 unit/mL injection Inject 75 Units under the skin Daily before breakfast.      ? insulin aspart (NOVOLOG) 100 unit/mL injection Inject under the skin 4 (four) times a day. Sliding scale:   140-180: Give 4 units  181-220: Give 6 units  221-260: Give 8 units  261-300: Give 10  301-340: Give 12 units  341-380: Give 14 units  >381: Give 14 units and call MD     ? insulin aspart (NOVOLOG) 100 unit/mL injection Inject 66 Units under the skin daily before lunch.     ? insulin aspart (NOVOLOG) 100 unit/mL injection Inject 66 Units under the skin daily before supper.     ? insulin glargine (LANTUS) 100 unit/mL injection Inject  100 Units under the skin 2 (two) times a day.      ? latanoprost (XALATAN) 0.005 % ophthalmic solution Administer 1 drop to both eyes bedtime.      ? liraglutide (VICTOZA) 0.6 mg/0.1 mL (18 mg/3 mL) PnIj injection Inject 1.8 mg under the skin daily. Call [phone] Dr. Washington if she develops nausea lasting >3 days.     ? lisinopril (PRINIVIL,ZESTRIL) 10 MG tablet Take 10 mg by mouth daily. Hold for SBP<110     ? loratadine (CLARITIN) 10 mg tablet Take 10 mg by mouth daily as needed for allergies.     ? metoprolol tartrate (LOPRESSOR) 50 MG tablet Take 100 mg by mouth 2 (two) times a day.      ? mineral oil Drop Administer 3 drops into both ears every morning. Every day shift, apply on Q-tip and rub into ears.     ? mineral oil Drop Administer 4 drops into both ears see administration instructions. Instill 4 drop in both ears BID x4days then irrigate     ? nitroglycerin (NITROSTAT) 0.4 MG SL tablet Place 0.4 mg under the tongue every 5 (five) minutes as needed for chest pain. 1 tablet SL Q 5 minutes up to 3 doses. Call [phone] if chest pain persists.     ? pregabalin (LYRICA) 25 MG capsule Take 50 mg by mouth 3 (three) times a day.      ? ranitidine (ZANTAC) 150 MG tablet Take 150 mg by mouth once daily.      ? senna-docusate (SENNOSIDES-DOCUSATE SODIUM) 8.6-50 mg tablet Take 1 tablet by mouth every other day. And 1 QOD PRN     ? tolnaftate (ANTIFUNGAL, TOLNAFTATE,) 1 % powder Apply 1 application topically 2 (two) times a day. Apply to feet     ? traMADol (ULTRAM) 50 mg tablet Take 50 mg by mouth every 8 (eight) hours as needed for pain.       No current facility-administered medications for this visit.       Allergies   Allergen Reactions   ? Nuts - Unspecified    ? Unable To Assess      Mountain Dew per [facility] medication review report          Lab Results   Lab Results   Component Value Date     02/16/2017    K 4.2 02/16/2017     (H) 02/16/2017    CO2 22 02/16/2017    BUN 17 02/16/2017     CREATININE 0.97 02/16/2017    CALCIUM 9.8 02/16/2017     Lab Results   Component Value Date    WBC 5.8 10/27/2011    HGB 9.1 (L) 11/16/2016    HCT 25.2 (L) 10/27/2011    MCV 90 10/27/2011     10/27/2011     Lab Results   Component Value Date    CHOL 101 10/20/2015    TRIG 146 10/20/2015    HDL 31 (L) 10/20/2015    LDLCALC 41 10/20/2015    LDLDIRECT 42 10/21/2016     Lab Results   Component Value Date    INR 1.06 01/11/2011     Lab Results   Component Value Date    BNP 33 10/25/2011     Lab Results   Component Value Date    CKTOTAL 63 04/15/2015    CKTOTAL 126 11/10/2011    CKTOTAL 59 07/16/2009    CKMB 1 01/12/2011    CKMB 2 01/11/2011    CKMB <1 07/16/2009    TROPONINI <0.01 11/11/2011    TROPONINI <0.01 10/25/2011    TROPONINI <0.01 03/06/2011     Lab Results   Component Value Date    TSH 1.40 10/21/2016           Medical History  Surgical History   Past Medical History:   Diagnosis Date   ? Allergic rhinitis    ? CAD (coronary artery disease)    ? Carpal tunnel syndrome    ? Cataract    ? Chronic kidney disease    ? Debility    ? Depression    ? Diabetic nephropathy    ? Diabetic neuropathy    ? GERD (gastroesophageal reflux disease)    ? Glaucoma    ? Gout    ? History of pyelonephritis    ? HTN (hypertension)    ? Hyperlipidemia    ? IDDM (insulin dependent diabetes mellitus)     Type 2   ? Lower extremity edema    ? Myocardial infarction       Past Surgical History:   Procedure Laterality Date   ? CORONARY STENT PLACEMENT

## 2021-06-26 NOTE — TELEPHONE ENCOUNTER
[facility] Nurse Triage Telephone Note      Provider: DANYELLE Wesley  Facility: [facility] Type: LTC    Caller: Evie  Call Back Number:  [phone]    Allergies: Nuts - unspecified and Unable to assess    Reason for call: Nurse calling to report BMP and HgbA1c results that were ordered per Endocrinology.  Patient's potassium level is slightly low.  Notable meds:  Metoprolol 100mg two times a day, Chlorthalidone 25mg daily.       Verbal Order/Direction given by Provider: Potassium 20meq x 1 today.  Check potassium level tomorrow.  Encourage fluids.  Fax labs to endocrinology.      Provider giving order: DANYELLE Wesley    Verbal order given to: Tere Vora RN

## 2021-06-26 NOTE — TELEPHONE ENCOUNTER
[facility] Nurse Triage Telephone Note      Provider: DANYELLE Wesley  Facility: [facility] Type: LTC    Caller: Rosalva  Call Back Number:  [phone]    Allergies: Nuts - unspecified and Unable to assess    Reason for call: K+ 3.7 today yesterday 3.3 & had K 20 X 1.     Verbal Order/Direction given by Provider: NNO    Provider giving order: DANYELLE Wesley    Verbal order given to: Rosalva Reis RN

## 2021-06-27 NOTE — PROGRESS NOTES
Progress Notes by Meagan Valdez MD at 8/13/2019 10:52 PM     Author: Meagan Valdez MD Service: -- Author Type: Physician    Filed: 8/16/2019 12:54 AM Encounter Date: 8/13/2019 Status: Signed    : Meagan Valdez MD (Physician)       [facility]      Facility:    [facility] [[id number]]  Code Status: FULL CODE       Chief Complaint/Reason for Visit:  Chief Complaint   Patient presents with   ? H & P     Re admit to LT after hysterectomy.        HPI:   Cristal is a 69 y.o. female seen for readmission to long-term care at [facility]. She has been a resident here since October 2011. She does have multiple complex co morbidities. She is treated for hypertension and has insulin-dependent diabetes mellitus. She sees an endocrinologist. She has chronic neuropathy, chronic kidney disease and is treated with Cymbalta for depression and chronic pain. She is on gabapentin and lyrica for neuropathy pain. She has chronic weakness in her lower extremities and is wheelchair bound. She has chronic urinary incontinence. Hospitalized in April 2018 for chest pain. It is noted she has coronary artery disease having had PCI with stent placement in 2009. Her chest pain was reproducible on exam. Negative 3 sets of troponin. EKG showed no significant ischemic changes. Pharmacological stress was negative for inducible ischemia so no intervention was required. She has periodic follow up with cardiology.     She was admitted to the [facility] for planned hysterectomy. She had been experiencing brownish vaginal discharge, referred to gynecology with endometrial biopsy showing atypia. Her hospital info is excerpted below.             She returned to LTC on 8/6/19.    Today:  She has a bandage on her left heel, a blister was noted on 8/9/19. She is nonambulatory in wheelchair. Has chronic pain. No new concerns. No shortness of breath or chest pain. No abdominal  mary today. She will need follow up with gyn. No bleeding noted. BPs satisfactory. Accuchecks followed for DM, she is on metformin and insulin. No fever, cough or congestion.     Past Medical History:  Past Medical History:   Diagnosis Date   ? Acute cystitis with hematuria    ? Allergic rhinitis    ? CAD (coronary artery disease)    ? Carpal tunnel syndrome    ? Cataract    ? Cerebral vascular accident (H)    ? Chronic kidney disease    ? Debility    ? Depression    ? Diabetes mellitus, type II (H)    ? Diabetic nephropathy (H)    ? GERD (gastroesophageal reflux disease)    ? Glaucoma    ? Gout    ? History of pyelonephritis    ? HTN (hypertension)    ? Hyperlipidemia    ? IDDM (insulin dependent diabetes mellitus) (H)     Type 2   ? Lower extremity edema    ? Myocardial infarction (H)            Surgical History:  Past Surgical History:   Procedure Laterality Date   ? CARDIAC CATHETERIZATION     ? CATARACT EXTRACTION W/ INTRAOCULAR LENS IMPLANT Bilateral    ?  SECTION     ? CORONARY STENT PLACEMENT  2004    RCA   ? TOTAL ABDOMINAL HYSTERECTOMY W/ BILATERAL SALPINGOOPHORECTOMY  2019    Procedure: DaVinci Assisted Total Laparoscopic Hysterectomy, Removal Of Both Tubes And Ovaries; Surgeon: Linh Cardoso MD; Location:  OR         Family History:   Family History   Problem Relation Age of Onset   ? Hypertension Mother    ? Stroke Mother    ? Cancer Father    ? Glaucoma Father    ? Arthritis Brother    ? Diabetes Sister    ? Glaucoma Sister        Social History:    Social History     Socioeconomic History   ? Marital status: Single     Spouse name: Not on file   ? Number of children: Not on file   ? Years of education: Not on file   ? Highest education level: Not on file   Occupational History   ? Not on file   Social Needs   ? Financial resource strain: Not on file   ? Food insecurity:     Worry: Not on file     Inability: Not on file   ? Transportation needs:     Medical: Not on file      Non-medical: Not on file   Tobacco Use   ? Smoking status: Former Smoker   ? Smokeless tobacco: Never Used   Substance and Sexual Activity   ? Alcohol use: No   ? Drug use: No   ? Sexual activity: Not on file   Lifestyle   ? Physical activity:     Days per week: Not on file     Minutes per session: Not on file   ? Stress: Not on file   Relationships   ? Social connections:     Talks on phone: Not on file     Gets together: Not on file     Attends Gnosticism service: Not on file     Active member of club or organization: Not on file     Attends meetings of clubs or organizations: Not on file     Relationship status: Not on file   ? Intimate partner violence:     Fear of current or ex partner: Not on file     Emotionally abused: Not on file     Physically abused: Not on file     Forced sexual activity: Not on file   Other Topics Concern   ? Not on file   Social History Narrative    10/13/2015 - The patient lives at [facility] for [facility] years.       Medications:  Current Outpatient Medications   Medication Sig   ? acetaminophen (TYLENOL) 500 MG tablet Take 1,000 mg by mouth 3 (three) times a day .         ? aspirin 81 mg chewable tablet Chew 81 mg daily.   ? atorvastatin (LIPITOR) 80 MG tablet Take 80 mg by mouth bedtime.    ? carboxymethylcellulose (REFRESH PLUS) 0.5 % Dpet ophthalmic dropperette Administer 1 drop to both eyes 3 (three) times a day.   ? chlorthalidone (HYGROTEN) 25 MG tablet Take 25 mg by mouth daily.    ? dorzolamide-timolol (COSOPT) 22.3-6.8 mg/mL ophthalmic solution Administer 1 drop to both eyes 2 (two) times a day.    ? DULoxetine (CYMBALTA) 60 MG capsule Take 60 mg by mouth daily.   ? ferrous sulfate 325 (65 FE) MG tablet Take 1 tablet by mouth daily with breakfast.   ? folic acid (FOLVITE) 1 MG tablet Take 1 mg by mouth daily.   ? gabapentin (NEURONTIN) 300 MG capsule Take 600 mg by mouth 2 (two) times a day. 900 mg at bedtime         ? insulin regular hum U-500 conc, HumuLIN R, (HUMULIN R CONC)  500 unit/mL CONCENTRATED injection Inject 50 Units under the skin 2 (two) times a day. And 60 units daily. Call if BG <70 or >350         ? ketoconazole (NIZORAL) 2 % shampoo Apply 1 application topically 2 (two) times a week Apply to damp skin, lather, leave on 5 minutes, and rinse. Apply on Wednesdays and Saturdays..         ? latanoprost (XALATAN) 0.005 % ophthalmic solution Administer 1 drop to both eyes bedtime.    ? liraglutide (VICTOZA) 0.6 mg/0.1 mL (18 mg/3 mL) PnIj injection Inject 1.8 mg under the skin daily. Call [phone] Dr. Washington if she develops nausea lasting >3 days.   ? lisinopril (PRINIVIL,ZESTRIL) 40 MG tablet Take 20 mg by mouth daily. Hold for SBP <110         ? loratadine (CLARITIN) 10 mg tablet Take 10 mg by mouth daily as needed for allergies.   ? LYRICA 75 mg capsule TAKE 1 CAP BY MOUTH THREE TIMES DAILY   ? metFORMIN (GLUCOPHAGE) 1000 MG tablet Take 1,000 mg by mouth daily with supper.   ? metoprolol tartrate (LOPRESSOR) 100 MG tablet Take 100 mg by mouth 2 (two) times a day.   ? mineral oil Drop Administer 3 drops into both ears daily as needed Every day shift, apply on Q-tip and rub into ears. .         ? nitroglycerin (NITROSTAT) 0.4 MG SL tablet Place 0.4 mg under the tongue every 5 (five) minutes as needed for chest pain. 1 tablet SL Q 5 minutes up to 3 doses. Call [phone] if chest pain persists.   ? olopatadine 0.2 % Drop Apply 1 drop to eye daily as needed.   ? oxyCODONE (ROXICODONE) 5 MG immediate release tablet Take 5 mg by mouth every 6 (six) hours as needed for pain.   ? ranitidine (ZANTAC) 150 MG tablet Take 150 mg by mouth 2 (two) times a day.          ? senna-docusate (SENNOSIDES-DOCUSATE SODIUM) 8.6-50 mg tablet Take 2 tablets by mouth as needed for constipation.          ? traMADol (ULTRAM) 50 mg tablet Take 1 tablet (50 mg total) by mouth 3 (three) times a day as needed for pain. For pain 6 or higher   ? triamcinolone (KENALOG) 0.1 % ointment Apply 1 application topically  daily. Apply to legs in the morning  And to right ear prn BID       Allergies:  Allergies   Allergen Reactions   ? Nuts - Unspecified    ? Unable To Assess      Mountain Dew per [facility] medication review report        Review of Systems:  Pertinent items are noted in HPI.      Physical Exam:   General: Patient is alert female, no distress.   Vitals: /64, 143/78, 138/51, Temp 98.2, Pulse 80, RR 18, O2 sat 97% RA.  HEENT: Head is NCAT. Eyes show no injection or icterus. Nares negative. Oropharynx well hydrated.  Neck: Supple. No tenderness or adenopathy. No JVD.  Lungs: Clear bilaterally. No wheezes.  Cardiovascular: Regular rate and rhythm, normal S1, S2.  Back: No spinal or CVA tenderness.  Abdomen: Soft, no tenderness on exam. Bowel sounds present. No guarding rebound or rigidity.  : Deferred.  Extremities: Mild LE edema is noted.  Musculoskeletal: Deformities small joints hands.  Skin: Blister left heel.  Psych: Mood appears good.      Labs:  Lab Results   Component Value Date    WBC 4.3 04/24/2018    HGB 9.4 (L) 04/24/2018    HCT 28.6 (L) 04/24/2018     (H) 04/24/2018     04/26/2018     Results for orders placed or performed in visit on 01/10/19   Basic Metabolic Panel   Result Value Ref Range    Sodium 138 136 - 145 mmol/L    Potassium 4.6 3.5 - 5.0 mmol/L    Chloride 108 (H) 98 - 107 mmol/L    CO2 21 (L) 22 - 31 mmol/L    Anion Gap, Calculation 9 5 - 18 mmol/L    Glucose 136 (H) 70 - 125 mg/dL    Calcium 9.6 8.5 - 10.5 mg/dL    BUN 32 (H) 8 - 22 mg/dL    Creatinine 1.46 (H) 0.60 - 1.10 mg/dL    GFR MDRD Af Amer 43 (L) >60 mL/min/1.73m2    GFR MDRD Non Af Amer 36 (L) >60 mL/min/1.73m2       Assessment/Plan:  1. Hysterectomy. Surgery on 8/5/19 due to endometrial atypia noted on biopsy done for postmenopausal bleeding.  2. IDDM. On insulin, victoza, and metformin. Continue to follow accuchecks. She sees endocrinology.  3. HTN. BPs overall acceptable. On lisinopril, metoprolol,  chlorthalidone.    4. Neuropathy. Chronic pain controlled with gabapentin, lyrica and Cymbalta.  5. Hx of stroke. Wheelchair bound, non ambulatory.  6. Glaucoma. Visual impairment at baseline.  7. Left heel blister. Bandage and protect.   8. Hx CVA.    9. Depression. Continue cymbalta.  10. Code status is full code.         Electronically signed by: Meagan Valdez MD

## 2021-07-10 ENCOUNTER — MEDICAL CORRESPONDENCE (OUTPATIENT)
Dept: HEALTH INFORMATION MANAGEMENT | Facility: CLINIC | Age: 71
End: 2021-07-10

## 2021-07-31 ENCOUNTER — OFFICE VISIT (OUTPATIENT)
Dept: GERIATRICS | Facility: CLINIC | Age: 71
End: 2021-07-31
Payer: COMMERCIAL

## 2021-07-31 DIAGNOSIS — G62.9 NEUROPATHY: ICD-10-CM

## 2021-07-31 DIAGNOSIS — I10 ESSENTIAL HYPERTENSION: ICD-10-CM

## 2021-07-31 DIAGNOSIS — Z86.73 HISTORY OF STROKE: ICD-10-CM

## 2021-07-31 DIAGNOSIS — E11.69 TYPE 2 DIABETES MELLITUS WITH OTHER SPECIFIED COMPLICATION, WITH LONG-TERM CURRENT USE OF INSULIN (H): Primary | ICD-10-CM

## 2021-07-31 DIAGNOSIS — Z79.4 TYPE 2 DIABETES MELLITUS WITH OTHER SPECIFIED COMPLICATION, WITH LONG-TERM CURRENT USE OF INSULIN (H): Primary | ICD-10-CM

## 2021-07-31 PROCEDURE — 99309 SBSQ NF CARE MODERATE MDM 30: CPT | Performed by: FAMILY MEDICINE

## 2021-08-10 ENCOUNTER — MEDICAL CORRESPONDENCE (OUTPATIENT)
Dept: HEALTH INFORMATION MANAGEMENT | Facility: CLINIC | Age: 71
End: 2021-08-10

## 2021-08-18 ENCOUNTER — LAB REQUISITION (OUTPATIENT)
Dept: LAB | Facility: CLINIC | Age: 71
End: 2021-08-18
Payer: COMMERCIAL

## 2021-08-18 DIAGNOSIS — Z11.59 ENCOUNTER FOR SCREENING FOR OTHER VIRAL DISEASES: ICD-10-CM

## 2021-08-18 PROCEDURE — U0005 INFEC AGEN DETEC AMPLI PROBE: HCPCS | Mod: ORL | Performed by: FAMILY MEDICINE

## 2021-08-19 LAB — SARS-COV-2 RNA RESP QL NAA+PROBE: NEGATIVE

## 2021-09-01 ENCOUNTER — MEDICAL CORRESPONDENCE (OUTPATIENT)
Dept: HEALTH INFORMATION MANAGEMENT | Facility: CLINIC | Age: 71
End: 2021-09-01

## 2021-09-10 ENCOUNTER — NURSING HOME VISIT (OUTPATIENT)
Dept: GERIATRICS | Facility: CLINIC | Age: 71
End: 2021-09-10
Payer: COMMERCIAL

## 2021-09-10 ENCOUNTER — MEDICAL CORRESPONDENCE (OUTPATIENT)
Dept: HEALTH INFORMATION MANAGEMENT | Facility: CLINIC | Age: 71
End: 2021-09-10

## 2021-09-10 VITALS
SYSTOLIC BLOOD PRESSURE: 128 MMHG | DIASTOLIC BLOOD PRESSURE: 70 MMHG | BODY MASS INDEX: 29.32 KG/M2 | OXYGEN SATURATION: 100 % | HEART RATE: 80 BPM | RESPIRATION RATE: 18 BRPM | TEMPERATURE: 98.3 F | WEIGHT: 165.5 LBS

## 2021-09-10 DIAGNOSIS — I10 ESSENTIAL HYPERTENSION: ICD-10-CM

## 2021-09-10 DIAGNOSIS — E11.42 DIABETIC PERIPHERAL NEUROPATHY ASSOCIATED WITH TYPE 2 DIABETES MELLITUS (H): ICD-10-CM

## 2021-09-10 DIAGNOSIS — Z79.4 TYPE 2 DIABETES MELLITUS WITH OTHER SPECIFIED COMPLICATION, WITH LONG-TERM CURRENT USE OF INSULIN (H): Primary | ICD-10-CM

## 2021-09-10 DIAGNOSIS — E11.69 TYPE 2 DIABETES MELLITUS WITH OTHER SPECIFIED COMPLICATION, WITH LONG-TERM CURRENT USE OF INSULIN (H): Primary | ICD-10-CM

## 2021-09-10 PROCEDURE — 99310 SBSQ NF CARE HIGH MDM 45: CPT | Performed by: NURSE PRACTITIONER

## 2021-09-10 RX ORDER — LOPERAMIDE HYDROCHLORIDE 2 MG/1
2 TABLET ORAL 4 TIMES DAILY PRN
Status: ON HOLD | COMMUNITY
End: 2023-01-01

## 2021-09-10 RX ORDER — SIMETHICONE 80 MG
80 TABLET,CHEWABLE ORAL 4 TIMES DAILY
COMMUNITY
End: 2022-04-06

## 2021-09-10 RX ORDER — BISACODYL 10 MG
10 SUPPOSITORY, RECTAL RECTAL DAILY PRN
COMMUNITY
End: 2022-04-27

## 2021-09-10 NOTE — LETTER
9/10/2021        RE: Cristal Preciado  [facility]  [address]  [facility] [address]        [facility]    Facility:   [facility] [[id number]]   Code Status: FULL CODE      CHIEF COMPLAINT/REASON FOR VISIT:  Chief Complaint   Patient presents with     FVP Care Coordination - Regulatory       HISTORY:      HPI: Cristal is a 71 y.o. female residing  in the long-term care facility [facility]. She has been a resident here since October 2011. She does have multiple complex co- morbidities. She is treated for hypertension and has insulin-dependent diabetes mellitus. She has a endocrinologist who follows her  blood sugars regularly.  She has chronic neuropathy, chronic kidney disease and is treated with Cymbalta for depression. She is on gabapentin and lyrica for neuropathy pain. She is on meds for depression.  Does require a lift for transfers. She has chronic weakness in her lower extremities.      Today she is seen for a routine  Regulatory visit .  She is with a  hysterectomy October 2019.   She denies chest pain or shortness of breath.  She denies cough or congestion.  She is  non ambulatory, She is on  gabapentin and lyrica for neuropathy.. Her BS are being controlled by her endocrinologist and being sent in every couple of weeks.  Staff reported they will be sent in today.   She denies cough or congestion.  She is having intermittent numbness and pain in her hands.  No open areas on her skin.  She is being followed by the facility wound nurse for intermittent open areas on her buttocks.  Per the wound nurse they are currently healed at this time.  Her weights were reviewed and has been stable over the last month.  She is with intermittent vaginal burning.  She reports that the cream causes it at times.  Writer spoke with staff who reported that she called [facility] to say she was going to a family member's house and ended up  bringing herself to [facility] where she was treated for a yeast infection per staff this happened approximately 3 weeks ago    Past Medical History:   Diagnosis Date     Acute cystitis with hematuria      Allergic rhinitis      CAD (coronary artery disease)      Carpal tunnel syndrome      Cataract      Cerebral vascular accident (H)      Chronic kidney disease      Debility      Depression      Diabetes mellitus, type II (H)      Diabetic nephropathy (H)      GERD (gastroesophageal reflux disease)      Glaucoma      Gout      History of pyelonephritis      HTN (hypertension)      Hyperlipidemia      IDDM (insulin dependent diabetes mellitus) (H)     Type 2     Lower extremity edema      Myocardial infarction (H)              Family History   Problem Relation Age of Onset     Hypertension Mother      Stroke Mother      Cancer Father      Glaucoma Father      Arthritis Brother      Diabetes Sister      Glaucoma Sister      Social History     Socioeconomic History     Marital status: Single     Spouse name: Not on file     Number of children: Not on file     Years of education: Not on file     Highest education level: Not on file   Occupational History     Not on file   Social Needs     Financial resource strain: Not on file     Food insecurity     Worry: Not on file     Inability: Not on file     Transportation needs     Medical: Not on file     Non-medical: Not on file   Tobacco Use     Smoking status: Former Smoker     Smokeless tobacco: Never Used   Substance and Sexual Activity     Alcohol use: No     Drug use: No     Sexual activity: Not on file   Lifestyle     Physical activity     Days per week: Not on file     Minutes per session: Not on file     Stress: Not on file   Relationships     Social connections     Talks on phone: Not on file     Gets together: Not on file     Attends Jain service: Not on file     Active member of club or organization: Not on file     Attends meetings of clubs or  organizations: Not on file     Relationship status: Not on file     Intimate partner violence     Fear of current or ex partner: Not on file     Emotionally abused: Not on file     Physically abused: Not on file     Forced sexual activity: Not on file   Other Topics Concern     Not on file   Social History Narrative    10/13/2015 - The patient lives at [facility] for [facility] years.         Review of Systems  Constitutional: Positive for activity change. Negative for appetite change, chills, fatigue and fever.        Lift for transfers.    HENT: Negative for congestion and sore throat.    Respiratory: Negative for shortness of breath and wheezing.    Cardiovascular: negative for leg swelling . Negative for chest pain. (intermittent Right  upper chest discomfort. She was seen by cardiology and no known cause found.  Denies today     compression stockings   Gastrointestinal: Negative for abdominal distention, abdominal pain, constipation, diarrhea and nausea.   Genitourinary: Negative for dysuria.   Musculoskeletal: Positive for arthralgias. Negative for myalgias.   Skin: Negative for color change, rash and wound.   Neurological: Positive for numbness. Negative for dizziness and weakness.        Severe neuropathy bilateral hands   Psychiatric/Behavioral: Negative for agitation, behavioral problems and sleep disturbance.   Vitals:    05/05/21 1101   BP: 128/60   Pulse: 76   Resp: 18   Temp: 98.3  F (36.8  C)   SpO2: 98%   Weight: 177 lb 8 oz (80.5 kg)       Physical Exam    Abdominal: She exhibits distension.  Abdomen is soft  Skin:    healed old lap sites on abdomen     Constitutional: She is oriented to person, place, and time. She appears well-developed and well-nourished.   Pleasant woman in no acute distress.   HENT:   Head: Normocephalic.   Eyes: Conjunctivae are normal.   Neck: Normal range of motion.   Cardiovascular: Normal rate, regular rhythm and normal heart sounds.   No murmur heard.  Pulmonary/Chest: Breath  sounds normal. No respiratory distress. She has no wheezes. She has no rales.   Abdominal: Soft. Bowel sounds are normal. She exhibits no distension. There is no tenderness.   Musculoskeletal: She exhibits edema.   Wearing Compression socks   Neurological: She is alert and oriented to person, place, and time.   Neuropathy bilateral hands.   Skin: Skin is warm.   Psychiatric: She has a normal mood and affect. Her behavior is normal  LABS:   No results found for this or any previous visit (from the past 240 hour(s)).  Case Management:  I have reviewed the facility/SNF care plan/MDS which was done 7/20/2021 including the falls risk, nutrition and pain screening. I also reviewed the current immunizations, and preventive care..She is up to date on tests. Patient's desire to return to the community is not present.    Information reviewed:  Medications, vital signs, orders, and nursing notes.    ASSESSMENT:      ICD-10-CM    1. Type 2 diabetes mellitus with hyperglycemia, with long-term current use of insulin (H)  E11.65     Z79.4    2. Diabetic peripheral neuropathy associated with type 2 diabetes mellitus (H)  E11.42        PLAN:       IDDM. FS are stable last A1C 8.1 on 6/17/2021.  Noted to be elevated since last March 2020. Endocrinologist following , Continue current diabetic medications as above.       HTN. Continue  Lisinpril and metoprolol-stable     Neuropathy   On lyrica and gabapentin.     CAD. HX  MI and stent.      S/p Hysterectomy in October 2019      Electronically signed by: Shawna Mesa CNP          Sincerely,        Shawna Mesa CNP

## 2021-09-10 NOTE — PROGRESS NOTES
[facility]    Facility:   [facility] [[id number]]   Code Status: FULL CODE      CHIEF COMPLAINT/REASON FOR VISIT:  Chief Complaint   Patient presents with     FVP Care Coordination - Regulatory       HISTORY:      HPI: Cristal is a 71 y.o. female residing  in the long-term care facility [facility]. She has been a resident here since October 2011. She does have multiple complex co- morbidities. She is treated for hypertension and has insulin-dependent diabetes mellitus. She has a endocrinologist who follows her  blood sugars regularly.  She has chronic neuropathy, chronic kidney disease and is treated with Cymbalta for depression. She is on gabapentin and lyrica for neuropathy pain. She is on meds for depression.  Does require a lift for transfers. She has chronic weakness in her lower extremities.      Today she is seen for a routine  Regulatory visit .  She is with a  hysterectomy October 2019.   She denies chest pain or shortness of breath.  She denies cough or congestion.  She is  non ambulatory, She is on  gabapentin and lyrica for neuropathy.. Her BS are being controlled by her endocrinologist and being sent in every couple of weeks.  Staff reported they will be sent in today.   She denies cough or congestion.  She is having intermittent numbness and pain in her hands.  No open areas on her skin.  She is being followed by the facility wound nurse for intermittent open areas on her buttocks.  Per the wound nurse they are currently healed at this time.  Her weights were reviewed and has been stable over the last month.  She is with intermittent vaginal burning.  She reports that the cream causes it at times.  Writer spoke with staff who reported that she called [facility] to say she was going to a family member's house and ended up bringing herself to [facility] where she was treated for a yeast infection per staff this happened approximately  3 weeks ago    Past Medical History:   Diagnosis Date     Acute cystitis with hematuria      Allergic rhinitis      CAD (coronary artery disease)      Carpal tunnel syndrome      Cataract      Cerebral vascular accident (H)      Chronic kidney disease      Debility      Depression      Diabetes mellitus, type II (H)      Diabetic nephropathy (H)      GERD (gastroesophageal reflux disease)      Glaucoma      Gout      History of pyelonephritis      HTN (hypertension)      Hyperlipidemia      IDDM (insulin dependent diabetes mellitus) (H)     Type 2     Lower extremity edema      Myocardial infarction (H)              Family History   Problem Relation Age of Onset     Hypertension Mother      Stroke Mother      Cancer Father      Glaucoma Father      Arthritis Brother      Diabetes Sister      Glaucoma Sister      Social History     Socioeconomic History     Marital status: Single     Spouse name: Not on file     Number of children: Not on file     Years of education: Not on file     Highest education level: Not on file   Occupational History     Not on file   Social Needs     Financial resource strain: Not on file     Food insecurity     Worry: Not on file     Inability: Not on file     Transportation needs     Medical: Not on file     Non-medical: Not on file   Tobacco Use     Smoking status: Former Smoker     Smokeless tobacco: Never Used   Substance and Sexual Activity     Alcohol use: No     Drug use: No     Sexual activity: Not on file   Lifestyle     Physical activity     Days per week: Not on file     Minutes per session: Not on file     Stress: Not on file   Relationships     Social connections     Talks on phone: Not on file     Gets together: Not on file     Attends [employer] service: Not on file     Active member of club or organization: Not on file     Attends meetings of clubs or organizations: Not on file     Relationship status: Not on file     Intimate partner violence     Fear of current or ex  partner: Not on file     Emotionally abused: Not on file     Physically abused: Not on file     Forced sexual activity: Not on file   Other Topics Concern     Not on file   Social History Narrative    10/13/2015 - The patient lives at [facility] for 5 years.         Review of Systems  Constitutional: Positive for activity change. Negative for appetite change, chills, fatigue and fever.        Lift for transfers.    HENT: Negative for congestion and sore throat.    Respiratory: Negative for shortness of breath and wheezing.    Cardiovascular: negative for leg swelling . Negative for chest pain. (intermittent Right  upper chest discomfort. She was seen by cardiology and no known cause found.  Denies today     compression stockings   Gastrointestinal: Negative for abdominal distention, abdominal pain, constipation, diarrhea and nausea.   Genitourinary: Negative for dysuria.   Musculoskeletal: Positive for arthralgias. Negative for myalgias.   Skin: Negative for color change, rash and wound.   Neurological: Positive for numbness. Negative for dizziness and weakness.        Severe neuropathy bilateral hands   Psychiatric/Behavioral: Negative for agitation, behavioral problems and sleep disturbance.   Vitals:    05/05/21 1101   BP: 128/60   Pulse: 76   Resp: 18   Temp: 98.3  F (36.8  C)   SpO2: 98%   Weight: 177 lb 8 oz (80.5 kg)       Physical Exam    Abdominal: She exhibits distension.  Abdomen is soft  Skin:    healed old lap sites on abdomen     Constitutional: She is oriented to person, place, and time. She appears well-developed and well-nourished.   Pleasant woman in no acute distress.   HENT:   Head: Normocephalic.   Eyes: Conjunctivae are normal.   Neck: Normal range of motion.   Cardiovascular: Normal rate, regular rhythm and normal heart sounds.   No murmur heard.  Pulmonary/Chest: Breath sounds normal. No respiratory distress. She has no wheezes. She has no rales.   Abdominal: Soft. Bowel sounds are  normal. She exhibits no distension. There is no tenderness.   Musculoskeletal: She exhibits edema.   Wearing Compression socks   Neurological: She is alert and oriented to person, place, and time.   Neuropathy bilateral hands.   Skin: Skin is warm.   Psychiatric: She has a normal mood and affect. Her behavior is normal  LABS:   No results found for this or any previous visit (from the past 240 hour(s)).  Case Management:  I have reviewed the facility/SNF care plan/MDS which was done 7/20/2021 including the falls risk, nutrition and pain screening. I also reviewed the current immunizations, and preventive care..She is up to date on tests. Patient's desire to return to the community is not present.    Information reviewed:  Medications, vital signs, orders, and nursing notes.    ASSESSMENT:      ICD-10-CM    1. Type 2 diabetes mellitus with hyperglycemia, with long-term current use of insulin (H)  E11.65     Z79.4    2. Diabetic peripheral neuropathy associated with type 2 diabetes mellitus (H)  E11.42        PLAN:       IDDM. FS are stable last A1C 8.1 on 6/17/2021.  Noted to be elevated since last March 2020. Endocrinologist following , Continue current diabetic medications as above.       HTN. Continue  Lisinpril and metoprolol-stable     Neuropathy   On lyrica and gabapentin.     CAD. HX  MI and stent.      S/p Hysterectomy in October 2019      Electronically signed by: Shawna Mesa CNP

## 2021-09-24 PROCEDURE — U0003 INFECTIOUS AGENT DETECTION BY NUCLEIC ACID (DNA OR RNA); SEVERE ACUTE RESPIRATORY SYNDROME CORONAVIRUS 2 (SARS-COV-2) (CORONAVIRUS DISEASE [COVID-19]), AMPLIFIED PROBE TECHNIQUE, MAKING USE OF HIGH THROUGHPUT TECHNOLOGIES AS DESCRIBED BY CMS-2020-01-R: HCPCS | Mod: ORL | Performed by: FAMILY MEDICINE

## 2021-09-25 ENCOUNTER — LAB REQUISITION (OUTPATIENT)
Dept: LAB | Facility: CLINIC | Age: 71
End: 2021-09-25
Payer: COMMERCIAL

## 2021-09-25 DIAGNOSIS — Z11.59 ENCOUNTER FOR SCREENING FOR OTHER VIRAL DISEASES: ICD-10-CM

## 2021-09-26 LAB — SARS-COV-2 RNA RESP QL NAA+PROBE: NOT DETECTED

## 2021-09-28 ENCOUNTER — LAB REQUISITION (OUTPATIENT)
Dept: LAB | Facility: CLINIC | Age: 71
End: 2021-09-28
Payer: COMMERCIAL

## 2021-09-28 DIAGNOSIS — Z11.59 ENCOUNTER FOR SCREENING FOR OTHER VIRAL DISEASES: ICD-10-CM

## 2021-09-28 PROCEDURE — U0003 INFECTIOUS AGENT DETECTION BY NUCLEIC ACID (DNA OR RNA); SEVERE ACUTE RESPIRATORY SYNDROME CORONAVIRUS 2 (SARS-COV-2) (CORONAVIRUS DISEASE [COVID-19]), AMPLIFIED PROBE TECHNIQUE, MAKING USE OF HIGH THROUGHPUT TECHNOLOGIES AS DESCRIBED BY CMS-2020-01-R: HCPCS | Mod: ORL | Performed by: FAMILY MEDICINE

## 2021-09-30 LAB — SARS-COV-2 RNA RESP QL NAA+PROBE: NOT DETECTED

## 2021-10-01 ENCOUNTER — MEDICAL CORRESPONDENCE (OUTPATIENT)
Dept: HEALTH INFORMATION MANAGEMENT | Facility: CLINIC | Age: 71
End: 2021-10-01
Payer: COMMERCIAL

## 2021-10-10 ENCOUNTER — MEDICAL CORRESPONDENCE (OUTPATIENT)
Dept: HEALTH INFORMATION MANAGEMENT | Facility: CLINIC | Age: 71
End: 2021-10-10

## 2021-10-12 ENCOUNTER — CLINICAL UPDATE (OUTPATIENT)
Dept: PHARMACY | Facility: CLINIC | Age: 71
End: 2021-10-12
Payer: COMMERCIAL

## 2021-10-12 DIAGNOSIS — I25.10 CORONARY ARTERY DISEASE INVOLVING NATIVE HEART WITHOUT ANGINA PECTORIS, UNSPECIFIED VESSEL OR LESION TYPE: Primary | ICD-10-CM

## 2021-10-12 DIAGNOSIS — K21.9 GASTROESOPHAGEAL REFLUX DISEASE WITHOUT ESOPHAGITIS: ICD-10-CM

## 2021-10-12 DIAGNOSIS — E11.42 DIABETIC POLYNEUROPATHY ASSOCIATED WITH TYPE 2 DIABETES MELLITUS (H): ICD-10-CM

## 2021-10-12 DIAGNOSIS — I63.9 CEREBROVASCULAR ACCIDENT (CVA), UNSPECIFIED MECHANISM (H): ICD-10-CM

## 2021-10-12 DIAGNOSIS — I10 BENIGN ESSENTIAL HYPERTENSION: ICD-10-CM

## 2021-10-12 DIAGNOSIS — D64.9 ANEMIA: ICD-10-CM

## 2021-10-12 DIAGNOSIS — E78.5 HYPERLIPIDEMIA, UNSPECIFIED HYPERLIPIDEMIA TYPE: ICD-10-CM

## 2021-10-12 PROCEDURE — 99207 PR NO CHARGE LOS: CPT | Performed by: PHARMACIST

## 2021-10-12 NOTE — PROGRESS NOTES
This patient's medication list and chart were reviewed as part of the service provided by [facility].    Assessment/Recommendations:  1. (Diabetes):  Noted pt started Jardiance 10mg every morning on 5/10/21 per endocrinology.  Has continued on this dose.  Of note, had ED visit due to candidal vaginitis on 8/13/21, and most recent NP encounter on 9/10/21 indicates patient with intermittent vaginal burning.  Of note, Jardiance may contribute to yeast infections, and very likely could be cause of most recent infection.  Endo may consider d'c Jardiance/re-evaluate continued use.  2. (Neuropathy):  Patient continues on both Gabapentin and Lyrica, which is duplicative; these meds have similar mechanism of action, combo does not add benefit, but increases risk of adverse effects.  Both meds may contribute to peripheral edema, confusion, sedation, dizziness, etc.  Pt is currently on max recommended Gabapentin dose for her kidney function (max recommended for CrCl 30-49ml/min is 900mg total daily in 2-3 divided doses), but does have room to increase Lyrica up to a max of 300mg total daily if necessary (based on last BMP on 6/17/21).  Please consider taper and d'c of Gabapentin and maximize Lyrica dose instead.      **please note that patient is not taking lisinopril (not on epic med list) - however, appears endocrine believes she is on this per chart review (most recent note lists lisinopril 10mg daily as an active med).  She was on lisinopril in the past per chart review, and I can not see why it was stopped, or if this was inadvertently dc'd.  She does have a h/o microalbuminuria, diabetes, CAD, HTN, and may benefit from initiation of low dose lisinopril 5mg daily, follow-up BMP in one week, and monitor BPs.  Reduction in metoprolol in future may be appropriate if BPs regularly <140/90mmHg, or if HR is on low end.  BP goal <140/90mmHg reasonable in this elderly patient at risk of hypotension,  dizziness, falls, tissue/cerebral hypoperfusion.    Est CrCl ([place]-Gault) = 45ml/min (based on Scr 1.14 & adjusted BW 61.5kg)    Tessie Brar Pharm.D.,[facility]  Board Certified Geriatric Pharmacist  Medication Therapy Management Pharmacist  [phone]

## 2021-10-12 NOTE — Clinical Note
Yusuf Matos - please consider checking with endocrine re jardiance and lisinopril recs - see note.  Thanks!

## 2021-10-25 ENCOUNTER — TELEPHONE (OUTPATIENT)
Dept: ENDOCRINOLOGY | Facility: CLINIC | Age: 71
End: 2021-10-25

## 2021-10-25 NOTE — TELEPHONE ENCOUNTER
3rd attempt: Spoke w/ Rosalva, list of BS and med list being faxed.   Not eating or drinking much at all; Losing weight, BS are running low,   ranges:   0730AM 77 - 186   1130 AM : 101-229  1600 :    Bedtime;   Taking oral meds, victoza and toujeo, as ordered  Sometimes evening snack is needed to elevate BS.   No appetite, CBC and BMP labs done today.   RTC in place.   Provider notifie.d   Jacquelyn Rubio RN on 10/28/2021 at 11:39 AM         2nd attempt. [phone]. No answer, unable to leave vm.   Jacquelyn Rubio RN on 10/26/2021 at 1:32 PM       Called general number: Nursing Station for Pt. No answer after 4m45s on hold.   Jacquelyn Rubio RN on 10/26/2021 at 10:03 AM       Attempted to reach Care home at number provided. No answer, unable to leave message.   Jacquelyn Rubio RN on 10/26/2021 at 9:57 AM     RE    Rocio with [facility] called regarding mutual patient, she would like Renetta Washington's care team to call her at [phone].

## 2021-10-27 ENCOUNTER — NURSING HOME VISIT (OUTPATIENT)
Dept: GERIATRICS | Facility: CLINIC | Age: 71
End: 2021-10-27
Payer: COMMERCIAL

## 2021-10-27 VITALS
TEMPERATURE: 98.3 F | SYSTOLIC BLOOD PRESSURE: 124 MMHG | RESPIRATION RATE: 18 BRPM | HEART RATE: 68 BPM | DIASTOLIC BLOOD PRESSURE: 70 MMHG | OXYGEN SATURATION: 100 % | BODY MASS INDEX: 28.87 KG/M2 | WEIGHT: 163 LBS

## 2021-10-27 DIAGNOSIS — R53.81 PHYSICAL DECONDITIONING: Primary | ICD-10-CM

## 2021-10-27 DIAGNOSIS — R53.83 OTHER FATIGUE: ICD-10-CM

## 2021-10-27 PROCEDURE — 99309 SBSQ NF CARE MODERATE MDM 30: CPT | Performed by: NURSE PRACTITIONER

## 2021-10-27 NOTE — PROGRESS NOTES
[facility]    Facility:   [facility] [[id number]]   Code Status: FULL CODE      CHIEF COMPLAINT/REASON FOR VISIT:  Chief Complaint   Patient presents with     P Care Coordination - Regulatory       HISTORY:      HPI: Cristal is a 71 y.o. female residing  in the long-term care facility [facility]. She has been a resident here since October 2011. She does have multiple complex co- morbidities. She is treated for hypertension and has insulin-dependent diabetes mellitus. She has a endocrinologist who follows her  blood sugars regularly.  She has chronic neuropathy, chronic kidney disease and is treated with Cymbalta for depression. She is on gabapentin and lyrica for neuropathy pain. She is on meds for depression.  Does require a lift for transfers. She has chronic weakness in her lower extremities.      Today she is seen for reports of increased fatigue and poor appetite.  Staff reported that patient just wants to lay in bed all day and her intake has been poor.  When speaking with the patient she denied any of these responses she did report she does not feel depressed however does feel tired..  She denies chest pain or shortness of breath.  She denies cough or congestion.  She is  non ambulatory, She is on  gabapentin and lyrica for neuropathy.. Her BS are being controlled by her endocrinologist and being sent in every couple of weeks.  She denies cough or congestion.  She does have numbness and pain in her hands.  No open areas on her skin.    Her weights were reviewed and has been stable over the last few months however her weight is down 8 pounds since July.      Past Medical History:   Diagnosis Date     Acute cystitis with hematuria      Allergic rhinitis      CAD (coronary artery disease)      Carpal tunnel syndrome      Cataract      Cerebral vascular accident (H)      Chronic kidney disease      Debility      Depression      Diabetes mellitus, type  II (H)      Diabetic nephropathy (H)      GERD (gastroesophageal reflux disease)      Glaucoma      Gout      History of pyelonephritis      HTN (hypertension)      Hyperlipidemia      IDDM (insulin dependent diabetes mellitus) (H)     Type 2     Lower extremity edema      Myocardial infarction (H)              Family History   Problem Relation Age of Onset     Hypertension Mother      Stroke Mother      Cancer Father      Glaucoma Father      Arthritis Brother      Diabetes Sister      Glaucoma Sister      Social History     Socioeconomic History     Marital status: Single     Spouse name: Not on file     Number of children: Not on file     Years of education: Not on file     Highest education level: Not on file   Occupational History     Not on file   Social Needs     Financial resource strain: Not on file     Food insecurity     Worry: Not on file     Inability: Not on file     Transportation needs     Medical: Not on file     Non-medical: Not on file   Tobacco Use     Smoking status: Former Smoker     Smokeless tobacco: Never Used   Substance and Sexual Activity     Alcohol use: No     Drug use: No     Sexual activity: Not on file   Lifestyle     Physical activity     Days per week: Not on file     Minutes per session: Not on file     Stress: Not on file   Relationships     Social connections     Talks on phone: Not on file     Gets together: Not on file     Attends [employer] service: Not on file     Active member of club or organization: Not on file     Attends meetings of clubs or organizations: Not on file     Relationship status: Not on file     Intimate partner violence     Fear of current or ex partner: Not on file     Emotionally abused: Not on file     Physically abused: Not on file     Forced sexual activity: Not on file   Other Topics Concern     Not on file   Social History Narrative    10/13/2015 - The patient lives at [facility] for [facility] years.         Review of Systems  Constitutional: Positive  for activity change. Negative for appetite change, chills, fatigue and fever.        Lift for transfers.    HENT: Negative for congestion and sore throat.    Respiratory: Negative for shortness of breath and wheezing.    Cardiovascular: negative for leg swelling . Negative for chest pain. (intermittent Right  upper chest discomfort. She was seen by cardiology and no known cause found.  Denies today     compression stockings   Gastrointestinal: Negative for abdominal distention, abdominal pain, constipation, diarrhea and nausea.   Genitourinary: Negative for dysuria.   Musculoskeletal: Positive for arthralgias. Negative for myalgias.   Skin: Negative for color change, rash and wound.   Neurological: Positive for numbness. Negative for dizziness and weakness.        Severe neuropathy bilateral hands   Psychiatric/Behavioral: Negative for agitation, behavioral problems and sleep disturbance.   Vitals:    05/05/21 1101   BP: 128/60   Pulse: 76   Resp: 18   Temp: 98.3  F (36.8  C)   SpO2: 98%   Weight: 177 lb 8 oz (80.5 kg)       Physical Exam    Abdominal: She exhibits distension.  Abdomen is soft  Skin:    healed old lap sites on abdomen     Constitutional: She is oriented to person, place, and time. She appears well-developed and well-nourished.   Pleasant woman in no acute distress.   HENT:   Head: Normocephalic.   Eyes: Conjunctivae are normal.   Neck: Normal range of motion.   Cardiovascular: Normal rate, regular rhythm and normal heart sounds.   No murmur heard.  Pulmonary/Chest: Breath sounds normal. No respiratory distress. She has no wheezes. She has no rales.   Abdominal: Soft. Bowel sounds are normal. She exhibits no distension. There is no tenderness.   Musculoskeletal: She exhibits edema.   Wearing Compression socks   Neurological: She is alert and oriented to person, place, and time.   Neuropathy bilateral hands.   Skin: Skin is warm.   Psychiatric: She has a normal mood and affect. Her behavior is  normal  LABS:   No results found for this or any previous visit (from the past 240 hour(s)).    ASSESSMENT:      ICD-10-CM    1. Type 2 diabetes mellitus with hyperglycemia, with long-term current use of insulin (H)  E11.65     Z79.4    2. Diabetic peripheral neuropathy associated with type 2 diabetes mellitus (H)  E11.42        PLAN:      Fatigue check a CBC BMP in the a.m.     IDDM. FS are stable last A1C 8.1 on 6/17/2021. Endocrinologist following , Continue current diabetic medications as above.       HTN. Continue  Lisinpril and metoprolol-stable     Neuropathy   On lyrica and gabapentin.     CAD. HX  MI and stent.      S/p Hysterectomy in October 2019      Electronically signed by: Shawna Mesa CNP

## 2021-10-27 NOTE — LETTER
10/27/2021        RE: Cristal Preciado  [facility]  [address]  [facility] [address]        [facility]    Facility:   [facility] [[id number]]   Code Status: FULL CODE      CHIEF COMPLAINT/REASON FOR VISIT:  Chief Complaint   Patient presents with     P Care Coordination - Regulatory       HISTORY:      HPI: Cristal is a 71 y.o. female residing  in the long-term care facility [facility]. She has been a resident here since October 2011. She does have multiple complex co- morbidities. She is treated for hypertension and has insulin-dependent diabetes mellitus. She has a endocrinologist who follows her  blood sugars regularly.  She has chronic neuropathy, chronic kidney disease and is treated with Cymbalta for depression. She is on gabapentin and lyrica for neuropathy pain. She is on meds for depression.  Does require a lift for transfers. She has chronic weakness in her lower extremities.      Today she is seen for reports of increased fatigue and poor appetite.  Staff reported that patient just wants to lay in bed all day and her intake has been poor.  When speaking with the patient she denied any of these responses she did report she does not feel depressed however does feel tired..  She denies chest pain or shortness of breath.  She denies cough or congestion.  She is  non ambulatory, She is on  gabapentin and lyrica for neuropathy.. Her BS are being controlled by her endocrinologist and being sent in every couple of weeks.  She denies cough or congestion.  She does have numbness and pain in her hands.  No open areas on her skin.    Her weights were reviewed and has been stable over the last few months however her weight is down 8 pounds since July.      Past Medical History:   Diagnosis Date     Acute cystitis with hematuria      Allergic rhinitis      CAD (coronary artery disease)      Carpal tunnel syndrome      Cataract       Cerebral vascular accident (H)      Chronic kidney disease      Debility      Depression      Diabetes mellitus, type II (H)      Diabetic nephropathy (H)      GERD (gastroesophageal reflux disease)      Glaucoma      Gout      History of pyelonephritis      HTN (hypertension)      Hyperlipidemia      IDDM (insulin dependent diabetes mellitus) (H)     Type 2     Lower extremity edema      Myocardial infarction (H)              Family History   Problem Relation Age of Onset     Hypertension Mother      Stroke Mother      Cancer Father      Glaucoma Father      Arthritis Brother      Diabetes Sister      Glaucoma Sister      Social History     Socioeconomic History     Marital status: Single     Spouse name: Not on file     Number of children: Not on file     Years of education: Not on file     Highest education level: Not on file   Occupational History     Not on file   Social Needs     Financial resource strain: Not on file     Food insecurity     Worry: Not on file     Inability: Not on file     Transportation needs     Medical: Not on file     Non-medical: Not on file   Tobacco Use     Smoking status: Former Smoker     Smokeless tobacco: Never Used   Substance and Sexual Activity     Alcohol use: No     Drug use: No     Sexual activity: Not on file   Lifestyle     Physical activity     Days per week: Not on file     Minutes per session: Not on file     Stress: Not on file   Relationships     Social connections     Talks on phone: Not on file     Gets together: Not on file     Attends Sabianist service: Not on file     Active member of club or organization: Not on file     Attends meetings of clubs or organizations: Not on file     Relationship status: Not on file     Intimate partner violence     Fear of current or ex partner: Not on file     Emotionally abused: Not on file     Physically abused: Not on file     Forced sexual activity: Not on file   Other Topics Concern     Not on file   Social History  Narrative    10/13/2015 - The patient lives at [facility] for 5 years.         Review of Systems  Constitutional: Positive for activity change. Negative for appetite change, chills, fatigue and fever.        Lift for transfers.    HENT: Negative for congestion and sore throat.    Respiratory: Negative for shortness of breath and wheezing.    Cardiovascular: negative for leg swelling . Negative for chest pain. (intermittent Right  upper chest discomfort. She was seen by cardiology and no known cause found.  Denies today     compression stockings   Gastrointestinal: Negative for abdominal distention, abdominal pain, constipation, diarrhea and nausea.   Genitourinary: Negative for dysuria.   Musculoskeletal: Positive for arthralgias. Negative for myalgias.   Skin: Negative for color change, rash and wound.   Neurological: Positive for numbness. Negative for dizziness and weakness.        Severe neuropathy bilateral hands   Psychiatric/Behavioral: Negative for agitation, behavioral problems and sleep disturbance.   Vitals:    05/05/21 1101   BP: 128/60   Pulse: 76   Resp: 18   Temp: 98.3  F (36.8  C)   SpO2: 98%   Weight: 177 lb 8 oz (80.5 kg)       Physical Exam    Abdominal: She exhibits distension.  Abdomen is soft  Skin:    healed old lap sites on abdomen     Constitutional: She is oriented to person, place, and time. She appears well-developed and well-nourished.   Pleasant woman in no acute distress.   HENT:   Head: Normocephalic.   Eyes: Conjunctivae are normal.   Neck: Normal range of motion.   Cardiovascular: Normal rate, regular rhythm and normal heart sounds.   No murmur heard.  Pulmonary/Chest: Breath sounds normal. No respiratory distress. She has no wheezes. She has no rales.   Abdominal: Soft. Bowel sounds are normal. She exhibits no distension. There is no tenderness.   Musculoskeletal: She exhibits edema.   Wearing Compression socks   Neurological: She is alert and oriented to person, place, and  time.   Neuropathy bilateral hands.   Skin: Skin is warm.   Psychiatric: She has a normal mood and affect. Her behavior is normal  LABS:   No results found for this or any previous visit (from the past 240 hour(s)).    ASSESSMENT:      ICD-10-CM    1. Type 2 diabetes mellitus with hyperglycemia, with long-term current use of insulin (H)  E11.65     Z79.4    2. Diabetic peripheral neuropathy associated with type 2 diabetes mellitus (H)  E11.42        PLAN:      Fatigue check a CBC BMP in the a.m.     IDDM. FS are stable last A1C 8.1 on 6/17/2021. Endocrinologist following , Continue current diabetic medications as above.       HTN. Continue  Lisinpril and metoprolol-stable     Neuropathy   On lyrica and gabapentin.     CAD. HX  MI and stent.      S/p Hysterectomy in October 2019      Electronically signed by: Shawna Mesa CNP          Sincerely,        Shawna Mesa CNP

## 2021-10-28 ENCOUNTER — TELEPHONE (OUTPATIENT)
Dept: GERIATRICS | Facility: CLINIC | Age: 71
End: 2021-10-28

## 2021-10-28 ENCOUNTER — LAB REQUISITION (OUTPATIENT)
Dept: LAB | Facility: CLINIC | Age: 71
End: 2021-10-28
Payer: COMMERCIAL

## 2021-10-28 ENCOUNTER — MEDICAL CORRESPONDENCE (OUTPATIENT)
Dept: HEALTH INFORMATION MANAGEMENT | Facility: CLINIC | Age: 71
End: 2021-10-28

## 2021-10-28 DIAGNOSIS — R53.83 OTHER FATIGUE: ICD-10-CM

## 2021-10-28 DIAGNOSIS — E11.21 TYPE 2 DIABETES MELLITUS WITH DIABETIC NEPHROPATHY, WITH LONG-TERM CURRENT USE OF INSULIN (H): Primary | ICD-10-CM

## 2021-10-28 DIAGNOSIS — Z79.4 TYPE 2 DIABETES MELLITUS WITH DIABETIC NEPHROPATHY, WITH LONG-TERM CURRENT USE OF INSULIN (H): Primary | ICD-10-CM

## 2021-10-28 LAB
ANION GAP SERPL CALCULATED.3IONS-SCNC: 13 MMOL/L (ref 5–18)
BUN SERPL-MCNC: 33 MG/DL (ref 8–22)
CALCIUM SERPL-MCNC: 10.4 MG/DL (ref 8.5–10.5)
CHLORIDE BLD-SCNC: 103 MMOL/L (ref 98–107)
CO2 SERPL-SCNC: 22 MMOL/L (ref 22–31)
CREAT SERPL-MCNC: 1.03 MG/DL (ref 0.6–1.1)
ERYTHROCYTE [DISTWIDTH] IN BLOOD BY AUTOMATED COUNT: 12.9 % (ref 10–15)
GFR SERPL CREATININE-BSD FRML MDRD: 56 ML/MIN/1.73M2
GLUCOSE BLD-MCNC: 129 MG/DL (ref 70–125)
HCT VFR BLD AUTO: 34.7 % (ref 35–47)
HGB BLD-MCNC: 11.3 G/DL (ref 11.7–15.7)
MCH RBC QN AUTO: 30.9 PG (ref 26.5–33)
MCHC RBC AUTO-ENTMCNC: 32.6 G/DL (ref 31.5–36.5)
MCV RBC AUTO: 95 FL (ref 78–100)
PLATELET # BLD AUTO: 219 10E3/UL (ref 150–450)
POTASSIUM BLD-SCNC: 3.7 MMOL/L (ref 3.5–5)
RBC # BLD AUTO: 3.66 10E6/UL (ref 3.8–5.2)
SODIUM SERPL-SCNC: 138 MMOL/L (ref 136–145)
WBC # BLD AUTO: 5.7 10E3/UL (ref 4–11)

## 2021-10-28 PROCEDURE — 36415 COLL VENOUS BLD VENIPUNCTURE: CPT | Mod: ORL | Performed by: FAMILY MEDICINE

## 2021-10-28 PROCEDURE — 80048 BASIC METABOLIC PNL TOTAL CA: CPT | Mod: ORL | Performed by: FAMILY MEDICINE

## 2021-10-28 PROCEDURE — P9604 ONE-WAY ALLOW PRORATED TRIP: HCPCS | Mod: ORL | Performed by: FAMILY MEDICINE

## 2021-10-28 PROCEDURE — 85027 COMPLETE CBC AUTOMATED: CPT | Mod: ORL | Performed by: FAMILY MEDICINE

## 2021-10-28 NOTE — TELEPHONE ENCOUNTER
[facility] Nurse Triage Telephone Note    Provider: DANYELLE Wesley  Facility: [facility] Facility Type:  [facility]    Caller: Rosalva  Call Back Number: [phone]    Allergies:    Allergies   Allergen Reactions     Dust Mites Other (See Comments)     Sneezing runny eyes and nose.     Food Allergy Formula Hives     Mountain Dew and Walnuts     Pollen Extract Other (See Comments)     Sneezing runny eyes and nose.        Reason for call: Nurse called to report lab results.      Verbal Order/Direction given by Provider: No new orders.     Provider giving Order:  DANYELLE Wesley    Verbal Order given to: Tere Painting, RN

## 2021-10-28 NOTE — PROGRESS NOTES
I spoke to MOSES Blackwell at [facility].  Patient has been declining and appetite is poor.  Glucose has been lower.  We discussed possibility of reducing insulin vs victoza.  We also discussed jardiance, as she did have a yeast infection last month.  This has since resolved.  Today, we decided to continue her current treatment regimen, but reduce Toujeo from 58 units to 45 units.  She will let me know if low glucose continues.  Cristal and I will discuss appetite/victoza at next appointment in a couple weeks.      KRISSY NoriegaC

## 2021-10-29 DIAGNOSIS — E11.42 DIABETIC POLYNEUROPATHY ASSOCIATED WITH TYPE 2 DIABETES MELLITUS (H): Primary | ICD-10-CM

## 2021-11-01 ENCOUNTER — TELEPHONE (OUTPATIENT)
Dept: OBGYN | Facility: CLINIC | Age: 71
End: 2021-11-01

## 2021-11-01 ENCOUNTER — OFFICE VISIT (OUTPATIENT)
Dept: OBGYN | Facility: CLINIC | Age: 71
End: 2021-11-01
Attending: OBSTETRICS & GYNECOLOGY
Payer: COMMERCIAL

## 2021-11-01 VITALS — DIASTOLIC BLOOD PRESSURE: 83 MMHG | HEART RATE: 93 BPM | SYSTOLIC BLOOD PRESSURE: 138 MMHG

## 2021-11-01 DIAGNOSIS — B37.31 YEAST VAGINITIS: Primary | ICD-10-CM

## 2021-11-01 DIAGNOSIS — K64.4 EXTERNAL HEMORRHOIDS: ICD-10-CM

## 2021-11-01 LAB
CLUE CELLS: NEGATIVE
TRICHOMONAS (WET PREP): NEGATIVE
WBC (WET PREP): NEGATIVE
YEAST (WET PREP): POSITIVE

## 2021-11-01 PROCEDURE — 87210 SMEAR WET MOUNT SALINE/INK: CPT | Performed by: OBSTETRICS & GYNECOLOGY

## 2021-11-01 PROCEDURE — G0463 HOSPITAL OUTPT CLINIC VISIT: HCPCS

## 2021-11-01 PROCEDURE — 99214 OFFICE O/P EST MOD 30 MIN: CPT | Performed by: OBSTETRICS & GYNECOLOGY

## 2021-11-01 RX ORDER — PREGABALIN 75 MG/1
75 CAPSULE ORAL 3 TIMES DAILY
Qty: 90 CAPSULE | Refills: 3 | Status: SHIPPED | OUTPATIENT
Start: 2021-11-01 | End: 2022-04-06

## 2021-11-01 RX ORDER — BENZOCAINE/MENTHOL 6 MG-10 MG
LOZENGE MUCOUS MEMBRANE 2 TIMES DAILY
Qty: 1.5 G | Refills: 0 | Status: SHIPPED | OUTPATIENT
Start: 2021-11-01 | End: 2021-11-08

## 2021-11-01 RX ORDER — TERCONAZOLE 0.4 %
1 CREAM WITH APPLICATOR VAGINAL AT BEDTIME
Qty: 1 G | Refills: 0 | Status: SHIPPED | OUTPATIENT
Start: 2021-11-01 | End: 2021-11-08

## 2021-11-01 NOTE — PROGRESS NOTES
SUBJECTIVE:   71 year old  female complains of vaginal discharge as well as constant leaking of urine and some stool. Has recent pressure ulcer that is improved. Notes some itching and burning of vulvar skin.      white and curd-like vaginal discharge for several days.   Denies abnormal vaginal bleeding or significant pelvic pain or  fever. No UTI symptoms. Denies history of known exposure to STD. Denies dyspareunia.    No LMP recorded. Patient is postmenopausal.    OBJECTIVE:   She appears fatigued and sleepy  Pelvic Exam: vaginal discharge described as white and curd-like, uterus surgically absent, wet mount exam shows budding hyphae    Wet prep exam was performed and demonstrated- yeast    Urinalysis was not performed    ASSESSMENT:      Yeast vaginitis  External hemorrhoids    PLAN:   Treatment: Terazol cream and prep H  Return to clinic if symptoms do not resolve or worsen.      Claribel Kerns MD, FACOG  (she/her/hers)    Department of Ob/Gyn/Women's Health  [employer]  [facility]  [address] [place] [address]. [place] [address]  [email or web address]  p. [phone]  f. [phone]

## 2021-11-01 NOTE — NURSING NOTE
Chief Complaint   Patient presents with     Follow Up     Candidal vaginitis   Nicolette Sanchez LPN

## 2021-11-01 NOTE — LETTER
11/1/2021       RE: Cristal Preciado  [address] [address]     Dear Colleague,    Thank you for referring your patient, Cristal Preciado, to the [facility] [place] at [facility]. Please see a copy of my visit note below.    SUBJECTIVE:   71 year old  female complains of vaginal discharge as well as constant leaking of urine and some stool. Has recent pressure ulcer that is improved. Notes some itching and burning of vulvar skin.      white and curd-like vaginal discharge for several days.   Denies abnormal vaginal bleeding or significant pelvic pain or  fever. No UTI symptoms. Denies history of known exposure to STD. Denies dyspareunia.    No LMP recorded. Patient is postmenopausal.    OBJECTIVE:   She appears fatigued and sleepy  Pelvic Exam: vaginal discharge described as white and curd-like, uterus surgically absent, wet mount exam shows budding hyphae    Wet prep exam was performed and demonstrated- yeast    Urinalysis was not performed    ASSESSMENT:      Yeast vaginitis  External hemorrhoids    PLAN:   Treatment: Terazol cream and prep H  Return to clinic if symptoms do not resolve or worsen.      Claribel Kerns MD, FACOG  (she/her/hers)    Department of Ob/Gyn/Women's Health  [facility]  [facility]  [address] [place] [address]  [email or web address]  p. [phone]  f. [phone]

## 2021-11-01 NOTE — TELEPHONE ENCOUNTER
Called and spoke with staff at [facility]. States they were unaware of pt's symptoms of yeast infection and hemorrhoids and wanted to clarify orders.     Informed staff that per Dr. Kerns's note, wet prep positive for yeast and external hemorrhoids present. Staff verbalized understanding and will assess for worsening symptoms or no improvement.

## 2021-11-01 NOTE — TELEPHONE ENCOUNTER
[facility] Call Center    Phone Message    May a detailed message be left on voicemail: yes     Reason for Call: Other: nurse from pt's care facility would like a call back to clarify orders from today's appt with Dr. Kerns. Please call [phone] to discuss     Action Taken: Message routed to:  Other: [facility]    Travel Screening: Not Applicable

## 2021-11-03 ENCOUNTER — NURSING HOME VISIT (OUTPATIENT)
Dept: GERIATRICS | Facility: CLINIC | Age: 71
End: 2021-11-03
Payer: COMMERCIAL

## 2021-11-03 VITALS
OXYGEN SATURATION: 98 % | WEIGHT: 163 LBS | HEIGHT: 63 IN | BODY MASS INDEX: 28.88 KG/M2 | HEART RATE: 74 BPM | RESPIRATION RATE: 18 BRPM | DIASTOLIC BLOOD PRESSURE: 72 MMHG | TEMPERATURE: 98.2 F | SYSTOLIC BLOOD PRESSURE: 116 MMHG

## 2021-11-03 DIAGNOSIS — M25.561 RIGHT KNEE PAIN, UNSPECIFIED CHRONICITY: Primary | ICD-10-CM

## 2021-11-03 PROCEDURE — 99309 SBSQ NF CARE MODERATE MDM 30: CPT | Performed by: NURSE PRACTITIONER

## 2021-11-03 RX ORDER — METOPROLOL TARTRATE 100 MG
100 TABLET ORAL 2 TIMES DAILY
Status: ON HOLD | COMMUNITY
End: 2023-01-01

## 2021-11-03 ASSESSMENT — MIFFLIN-ST. JEOR: SCORE: 1233.49

## 2021-11-03 NOTE — PROGRESS NOTES
[facility]    Facility:   [facility] [[id number]]   Code Status: FULL CODE      CHIEF COMPLAINT/REASON FOR VISIT:  Chief Complaint   Patient presents with     Right knee pain          HISTORY:      HPI: Cristal is a 71 y.o. female residing  in the long-term care facility [facility]. She has been a resident here since October 2011. She does have multiple complex co- morbidities. She is treated for hypertension and has insulin-dependent diabetes mellitus. She has a endocrinologist who follows her  blood sugars regularly.  She has chronic neuropathy, chronic kidney disease and is treated with Cymbalta for depression. She is on gabapentin and lyrica for neuropathy pain. She is on meds for depression.  Does require a lift for transfers. She has chronic weakness in her lower extremities.      Today she is seen for reports of right knee pain. Per staff, she  reported that she hurt her knee at her clinic appointment last Monday. When writer asked resident what happened at her appointment she reported that she woke up with a stiff knee in the morning and that nothing happened at the clinic appointment.  Today she has full passive range of motion with no facial grimacing.  She reports her pain medications are helpful.  At this time she will just be monitored for changes in status.  She denies chest pain or shortness of breath.  She denies cough or congestion.  She is  non ambulatory, She is on  gabapentin and lyrica for neuropathy.. Her BS are being controlled by her endocrinologist and being sent in every couple of weeks.  She denies cough or congestion.  She does have numbness and pain in her hands.  No open areas on her skin. Her weights have been stable    Past Medical History:   Diagnosis Date     Acute cystitis with hematuria      Allergic rhinitis      CAD (coronary artery disease)      Carpal tunnel syndrome      Cataract      Cerebral vascular accident (H)       Chronic kidney disease      Debility      Depression      Diabetes mellitus, type II (H)      Diabetic nephropathy (H)      GERD (gastroesophageal reflux disease)      Glaucoma      Gout      History of pyelonephritis      HTN (hypertension)      Hyperlipidemia      IDDM (insulin dependent diabetes mellitus) (H)     Type 2     Lower extremity edema      Myocardial infarction (H)              Family History   Problem Relation Age of Onset     Hypertension Mother      Stroke Mother      Cancer Father      Glaucoma Father      Arthritis Brother      Diabetes Sister      Glaucoma Sister      Social History     Socioeconomic History     Marital status: Single     Spouse name: Not on file     Number of children: Not on file     Years of education: Not on file     Highest education level: Not on file   Occupational History     Not on file   Social Needs     Financial resource strain: Not on file     Food insecurity     Worry: Not on file     Inability: Not on file     Transportation needs     Medical: Not on file     Non-medical: Not on file   Tobacco Use     Smoking status: Former Smoker     Smokeless tobacco: Never Used   Substance and Sexual Activity     Alcohol use: No     Drug use: No     Sexual activity: Not on file   Lifestyle     Physical activity     Days per week: Not on file     Minutes per session: Not on file     Stress: Not on file   Relationships     Social connections     Talks on phone: Not on file     Gets together: Not on file     Attends Sabianist service: Not on file     Active member of club or organization: Not on file     Attends meetings of clubs or organizations: Not on file     Relationship status: Not on file     Intimate partner violence     Fear of current or ex partner: Not on file     Emotionally abused: Not on file     Physically abused: Not on file     Forced sexual activity: Not on file   Other Topics Concern     Not on file   Social History Narrative    10/13/2015 - The patient lives  at [facility] for 5 years.         Review of Systems  Constitutional: Positive for activity change. Negative for appetite change, chills, fatigue and fever.        Lift for transfers.    HENT: Negative for congestion and sore throat.    Respiratory: Negative for shortness of breath and wheezing.    Cardiovascular: negative for leg swelling . Negative for chest pain. (intermittent Right  upper chest discomfort. She was seen by cardiology and no known cause found.  Denies today.     compression stockings   Gastrointestinal: Negative for abdominal distention, abdominal pain, constipation, diarrhea and nausea.   Genitourinary: Negative for dysuria.   Musculoskeletal: Positive for arthralgias. Negative for myalgias.   Skin: Negative for color change, rash and wound.   Neurological: Positive for numbness. Negative for dizziness and weakness.        Severe neuropathy bilateral hands   Psychiatric/Behavioral: Negative for agitation, behavioral problems and sleep disturbance.   Vitals:    05/05/21 1101   BP: 128/60   Pulse: 76   Resp: 18   Temp: 98.3  F (36.8  C)   SpO2: 98%   Weight: 177 lb 8 oz (80.5 kg)       Physical Exam    Abdominal: She exhibits distension.  Abdomen is soft  Skin:    healed old lap sites on abdomen     Constitutional: She is oriented to person, place, and time. She appears well-developed and well-nourished.   Pleasant woman in no acute distress.   HENT:   Head: Normocephalic.   Eyes: Conjunctivae are normal.   Neck: Normal range of motion.   Cardiovascular: Normal rate, regular rhythm and normal heart sounds.   No murmur heard.  Pulmonary/Chest: Breath sounds normal. No respiratory distress. She has no wheezes. She has no rales.   Abdominal: Soft. Bowel sounds are normal. She exhibits no distension. There is no tenderness.   Musculoskeletal: She exhibits edema.   Wearing Compression socks   Neurological: She is alert and oriented to person, place, and time.   Neuropathy bilateral hands.   Skin:  Skin is warm.   Psychiatric: She has a normal mood and affect. Her behavior is normal  LABS:   No results found for this or any previous visit (from the past 240 hour(s)).    Current Outpatient Medications:      ASPIRIN LOW DOSE 81 MG chewable tablet, CHEW AND SWALLOW 1 TAB BY MOUTH ONCE DAILY IN THE MORNING, Disp: , Rfl: 99     atorvastatin (LIPITOR) 80 MG tablet, Take 1 tablet by mouth daily. Pt needs to have labs done prior to further refills., Disp: 90 tablet, Rfl: 0     benzocaine-menthol (CEPACOL) 15-3.6 MG lozenge, Place 1 lozenge inside cheek every 2 hours as needed for moderate pain, Disp: , Rfl:      bisacodyl (DULCOLAX) 10 MG suppository, Place 10 mg rectally daily as needed for constipation, Disp: , Rfl:      carboxymethylcellulose (REFRESH PLUS) 0.5 % SOLN, 1 drop 3 times daily as needed., Disp: , Rfl:      CHLORTHALIDONE PO, Take 25 mg by mouth every morning , Disp: , Rfl:      dorzolamide-timolol 2-0.5 % OP ophthalmic solution, Place 1 drop into both eyes 2 times daily, Disp: 1 Bottle, Rfl: 11     DULoxetine HCl (CYMBALTA PO), Take 90 mg by mouth every morning , Disp: , Rfl:      empagliflozin (JARDIANCE) 10 MG TABS tablet, Take 1 tablet (10 mg) by mouth daily, Disp: 30 tablet, Rfl: 11     folic acid (FOLVITE) 1 MG tablet, Take 1 mg by mouth every morning , Disp: , Rfl:      gabapentin (NEURONTIN) 300 MG capsule, Take 300 mg by mouth 3 times daily, Disp: , Rfl:      hydrocortisone (CORTAID) 1 % external cream, Apply topically 2 times daily for 7 days, Disp: 1.5 g, Rfl: 0     ketoconazole (NIZORAL) 2 % shampoo, To entire wet scalp and ears and then wash off after 5 minutes three times a week., Disp: 240 mL, Rfl: 11     loperamide (IMODIUM A-D) 2 MG tablet, Take 2 mg by mouth 4 times daily as needed for diarrhea, Disp: , Rfl:      loratadine (CLARITIN) 10 MG tablet, Take 10 mg by mouth as needed., Disp: , Rfl:      metFORMIN (GLUCOPHAGE-XR) 500 MG 24 hr tablet, Take 2 tablets (1,000 mg) by mouth daily  (with dinner), Disp: 180 tablet, Rfl: 3     metoprolol tartrate (LOPRESSOR) 100 MG tablet, Take 100 mg by mouth 2 times daily, Disp: , Rfl:      nitroGLYCERIN (NITROSTAT) 0.4 MG SL tablet, Place 0.4 mg under the tongue every 5 minutes as needed., Disp: , Rfl:      olopatadine HCl (PATADAY) 0.2 % SOLN, Place 1 drop into both eyes daily as needed For itchy eyes, Disp: 1 Bottle, Rfl: 5     omeprazole (PRILOSEC) 20 MG DR capsule, Take 20 mg by mouth, Disp: , Rfl:      pregabalin (LYRICA) 75 MG capsule, Take 1 capsule (75 mg) by mouth 3 times daily, Disp: 90 capsule, Rfl: 3     senna-docusate (SENOKOT-S/PERICOLACE) 8.6-50 MG tablet, Take 2 tablets by mouth 2 times daily as needed for constipation, Disp: 60 tablet, Rfl: 0     simethicone (MYLICON) 80 MG chewable tablet, Take 80 mg by mouth 4 times daily, Disp: , Rfl:      terconazole (TERAZOL 7) 0.4 % vaginal cream, Place 1 applicator vaginally At Bedtime for 7 days, Disp: 1 g, Rfl: 0  ASSESSMENT:      ICD-10-CM    1. Type 2 diabetes mellitus with hyperglycemia, with long-term current use of insulin (H)  E11.65     Z79.4    2. Diabetic peripheral neuropathy associated with type 2 diabetes mellitus (H)  E11.42        PLAN:  .     IDDM. FS are stable last A1C 8.1 on 6/17/2021. Endocrinologist following , Continue current diabetic medications as above.       HTN. Continue  Lisinpril and metoprolol-stable     Neuropathy   On lyrica and gabapentin.     CAD. HX  MI and stent.      S/p Hysterectomy in October 2019    Right knee pain continue pain medications notify provider for any increased changes      Electronically signed by: Shawna Mesa CNP

## 2021-11-03 NOTE — LETTER
11/3/2021        RE: Cristal Preciado  [facility]  [address]  [facility] [address]        [facility]    Facility:   [facility] [[id number]]   Code Status: FULL CODE      CHIEF COMPLAINT/REASON FOR VISIT:  Chief Complaint   Patient presents with     Right knee pain          HISTORY:      HPI: Cristal is a 71 y.o. female residing  in the long-term care facility [facility]. She has been a resident here since October 2011. She does have multiple complex co- morbidities. She is treated for hypertension and has insulin-dependent diabetes mellitus. She has a endocrinologist who follows her  blood sugars regularly.  She has chronic neuropathy, chronic kidney disease and is treated with Cymbalta for depression. She is on gabapentin and lyrica for neuropathy pain. She is on meds for depression.  Does require a lift for transfers. She has chronic weakness in her lower extremities.      Today she is seen for reports of right knee pain. Per staff, she  reported that she hurt her knee at her clinic appointment last Monday. When writer asked resident what happened at her appointment she reported that she woke up with a stiff knee in the morning and that nothing happened at the clinic appointment.  Today she has full passive range of motion with no facial grimacing.  She reports her pain medications are helpful.  At this time she will just be monitored for changes in status.  She denies chest pain or shortness of breath.  She denies cough or congestion.  She is  non ambulatory, She is on  gabapentin and lyrica for neuropathy.. Her BS are being controlled by her endocrinologist and being sent in every couple of weeks.  She denies cough or congestion.  She does have numbness and pain in her hands.  No open areas on her skin. Her weights have been stable    Past Medical History:   Diagnosis Date     Acute cystitis with hematuria      Allergic  rhinitis      CAD (coronary artery disease)      Carpal tunnel syndrome      Cataract      Cerebral vascular accident (H)      Chronic kidney disease      Debility      Depression      Diabetes mellitus, type II (H)      Diabetic nephropathy (H)      GERD (gastroesophageal reflux disease)      Glaucoma      Gout      History of pyelonephritis      HTN (hypertension)      Hyperlipidemia      IDDM (insulin dependent diabetes mellitus) (H)     Type 2     Lower extremity edema      Myocardial infarction (H)              Family History   Problem Relation Age of Onset     Hypertension Mother      Stroke Mother      Cancer Father      Glaucoma Father      Arthritis Brother      Diabetes Sister      Glaucoma Sister      Social History     Socioeconomic History     Marital status: Single     Spouse name: Not on file     Number of children: Not on file     Years of education: Not on file     Highest education level: Not on file   Occupational History     Not on file   Social Needs     Financial resource strain: Not on file     Food insecurity     Worry: Not on file     Inability: Not on file     Transportation needs     Medical: Not on file     Non-medical: Not on file   Tobacco Use     Smoking status: Former Smoker     Smokeless tobacco: Never Used   Substance and Sexual Activity     Alcohol use: No     Drug use: No     Sexual activity: Not on file   Lifestyle     Physical activity     Days per week: Not on file     Minutes per session: Not on file     Stress: Not on file   Relationships     Social connections     Talks on phone: Not on file     Gets together: Not on file     Attends Mu-ism service: Not on file     Active member of club or organization: Not on file     Attends meetings of clubs or organizations: Not on file     Relationship status: Not on file     Intimate partner violence     Fear of current or ex partner: Not on file     Emotionally abused: Not on file     Physically abused: Not on file     Forced  sexual activity: Not on file   Other Topics Concern     Not on file   Social History Narrative    10/13/2015 - The patient lives at [facility] for 5 years.         Review of Systems  Constitutional: Positive for activity change. Negative for appetite change, chills, fatigue and fever.        Lift for transfers.    HENT: Negative for congestion and sore throat.    Respiratory: Negative for shortness of breath and wheezing.    Cardiovascular: negative for leg swelling . Negative for chest pain. (intermittent Right  upper chest discomfort. She was seen by cardiology and no known cause found.  Denies today.     compression stockings   Gastrointestinal: Negative for abdominal distention, abdominal pain, constipation, diarrhea and nausea.   Genitourinary: Negative for dysuria.   Musculoskeletal: Positive for arthralgias. Negative for myalgias.   Skin: Negative for color change, rash and wound.   Neurological: Positive for numbness. Negative for dizziness and weakness.        Severe neuropathy bilateral hands   Psychiatric/Behavioral: Negative for agitation, behavioral problems and sleep disturbance.   Vitals:    05/05/21 1101   BP: 128/60   Pulse: 76   Resp: 18   Temp: 98.3  F (36.8  C)   SpO2: 98%   Weight: 177 lb 8 oz (80.5 kg)       Physical Exam    Abdominal: She exhibits distension.  Abdomen is soft  Skin:    healed old lap sites on abdomen     Constitutional: She is oriented to person, place, and time. She appears well-developed and well-nourished.   Pleasant woman in no acute distress.   HENT:   Head: Normocephalic.   Eyes: Conjunctivae are normal.   Neck: Normal range of motion.   Cardiovascular: Normal rate, regular rhythm and normal heart sounds.   No murmur heard.  Pulmonary/Chest: Breath sounds normal. No respiratory distress. She has no wheezes. She has no rales.   Abdominal: Soft. Bowel sounds are normal. She exhibits no distension. There is no tenderness.   Musculoskeletal: She exhibits edema.   Wearing  Compression socks   Neurological: She is alert and oriented to person, place, and time.   Neuropathy bilateral hands.   Skin: Skin is warm.   Psychiatric: She has a normal mood and affect. Her behavior is normal  LABS:   No results found for this or any previous visit (from the past 240 hour(s)).    Current Outpatient Medications:      ASPIRIN LOW DOSE 81 MG chewable tablet, CHEW AND SWALLOW 1 TAB BY MOUTH ONCE DAILY IN THE MORNING, Disp: , Rfl: 99     atorvastatin (LIPITOR) 80 MG tablet, Take 1 tablet by mouth daily. Pt needs to have labs done prior to further refills., Disp: 90 tablet, Rfl: 0     benzocaine-menthol (CEPACOL) 15-3.6 MG lozenge, Place 1 lozenge inside cheek every 2 hours as needed for moderate pain, Disp: , Rfl:      bisacodyl (DULCOLAX) 10 MG suppository, Place 10 mg rectally daily as needed for constipation, Disp: , Rfl:      carboxymethylcellulose (REFRESH PLUS) 0.5 % SOLN, 1 drop 3 times daily as needed., Disp: , Rfl:      CHLORTHALIDONE PO, Take 25 mg by mouth every morning , Disp: , Rfl:      dorzolamide-timolol 2-0.5 % OP ophthalmic solution, Place 1 drop into both eyes 2 times daily, Disp: 1 Bottle, Rfl: 11     DULoxetine HCl (CYMBALTA PO), Take 90 mg by mouth every morning , Disp: , Rfl:      empagliflozin (JARDIANCE) 10 MG TABS tablet, Take 1 tablet (10 mg) by mouth daily, Disp: 30 tablet, Rfl: 11     folic acid (FOLVITE) 1 MG tablet, Take 1 mg by mouth every morning , Disp: , Rfl:      gabapentin (NEURONTIN) 300 MG capsule, Take 300 mg by mouth 3 times daily, Disp: , Rfl:      hydrocortisone (CORTAID) 1 % external cream, Apply topically 2 times daily for 7 days, Disp: 1.5 g, Rfl: 0     ketoconazole (NIZORAL) 2 % shampoo, To entire wet scalp and ears and then wash off after 5 minutes three times a week., Disp: 240 mL, Rfl: 11     loperamide (IMODIUM A-D) 2 MG tablet, Take 2 mg by mouth 4 times daily as needed for diarrhea, Disp: , Rfl:      loratadine (CLARITIN) 10 MG tablet, Take 10 mg by  mouth as needed., Disp: , Rfl:      metFORMIN (GLUCOPHAGE-XR) 500 MG 24 hr tablet, Take 2 tablets (1,000 mg) by mouth daily (with dinner), Disp: 180 tablet, Rfl: 3     metoprolol tartrate (LOPRESSOR) 100 MG tablet, Take 100 mg by mouth 2 times daily, Disp: , Rfl:      nitroGLYCERIN (NITROSTAT) 0.4 MG SL tablet, Place 0.4 mg under the tongue every 5 minutes as needed., Disp: , Rfl:      olopatadine HCl (PATADAY) 0.2 % SOLN, Place 1 drop into both eyes daily as needed For itchy eyes, Disp: 1 Bottle, Rfl: 5     omeprazole (PRILOSEC) 20 MG DR capsule, Take 20 mg by mouth, Disp: , Rfl:      pregabalin (LYRICA) 75 MG capsule, Take 1 capsule (75 mg) by mouth 3 times daily, Disp: 90 capsule, Rfl: 3     senna-docusate (SENOKOT-S/PERICOLACE) 8.6-50 MG tablet, Take 2 tablets by mouth 2 times daily as needed for constipation, Disp: 60 tablet, Rfl: 0     simethicone (MYLICON) 80 MG chewable tablet, Take 80 mg by mouth 4 times daily, Disp: , Rfl:      terconazole (TERAZOL 7) 0.4 % vaginal cream, Place 1 applicator vaginally At Bedtime for 7 days, Disp: 1 g, Rfl: 0  ASSESSMENT:      ICD-10-CM    1. Type 2 diabetes mellitus with hyperglycemia, with long-term current use of insulin (H)  E11.65     Z79.4    2. Diabetic peripheral neuropathy associated with type 2 diabetes mellitus (H)  E11.42        PLAN:  .     IDDM. FS are stable last A1C 8.1 on 6/17/2021. Endocrinologist following , Continue current diabetic medications as above.       HTN. Continue  Lisinpril and metoprolol-stable     Neuropathy   On lyrica and gabapentin.     CAD. HX  MI and stent.      S/p Hysterectomy in October 2019    Right knee pain continue pain medications notify provider for any increased changes      Electronically signed by: Shawna Mesa CNP          Sincerely,        Shawna Mesa CNP

## 2021-11-05 ENCOUNTER — TELEPHONE (OUTPATIENT)
Dept: GERIATRICS | Facility: CLINIC | Age: 71
End: 2021-11-05

## 2021-11-05 NOTE — TELEPHONE ENCOUNTER
[facility] Nurse Triage Telephone Note    Provider: DANYELLE Wesley  Facility: [facility] Facility Type:  [facility]    Caller: Rosalva  Call Back Number: [phone]    Allergies:    Allergies   Allergen Reactions     Dust Mites Other (See Comments)     Sneezing runny eyes and nose.     Food Allergy Formula Hives     Mountain Dew and Walnuts     Pollen Extract Other (See Comments)     Sneezing runny eyes and nose.        Reason for call: Nurse reporting that patient fell out of her wheelchair.  She was found down on her right side, but stated she bumped the left side of her head.  There is no bump noted on her head.  VSS.  Neuros are WNL.  Notable meds:  ASA 81mg daily.  Of note, patient has a specialized wheelchair with a custom cushion, however patient insists on also sitting on pillows, which then has patient sit very high in the wheelchair.      Verbal Order/Direction given by Provider: Remove pillows from the top of the wheelchair cushion.  Continue to monitor patient per protocol with VS and neuros.  Call if any changes.  Ok for OT to eval and treat if needed for wheelchair positioning.      Provider giving Order:  DANYELLE Wesley    Verbal Order given to: Rosalva Vora RN

## 2021-11-15 ENCOUNTER — TELEPHONE (OUTPATIENT)
Dept: ENDOCRINOLOGY | Facility: CLINIC | Age: 71
End: 2021-11-15

## 2021-11-15 ENCOUNTER — OFFICE VISIT (OUTPATIENT)
Dept: ENDOCRINOLOGY | Facility: CLINIC | Age: 71
End: 2021-11-15
Payer: COMMERCIAL

## 2021-11-15 VITALS — HEART RATE: 94 BPM | DIASTOLIC BLOOD PRESSURE: 49 MMHG | SYSTOLIC BLOOD PRESSURE: 118 MMHG

## 2021-11-15 DIAGNOSIS — E11.21 TYPE 2 DIABETES MELLITUS WITH DIABETIC NEPHROPATHY, WITH LONG-TERM CURRENT USE OF INSULIN (H): Primary | ICD-10-CM

## 2021-11-15 DIAGNOSIS — Z79.4 TYPE 2 DIABETES MELLITUS WITH DIABETIC NEPHROPATHY, WITH LONG-TERM CURRENT USE OF INSULIN (H): Primary | ICD-10-CM

## 2021-11-15 LAB — HBA1C MFR BLD: 6.4 % (ref 4.3–?)

## 2021-11-15 PROCEDURE — 99215 OFFICE O/P EST HI 40 MIN: CPT | Performed by: PHYSICIAN ASSISTANT

## 2021-11-15 PROCEDURE — 83036 HEMOGLOBIN GLYCOSYLATED A1C: CPT | Performed by: PHYSICIAN ASSISTANT

## 2021-11-15 RX ORDER — LIRAGLUTIDE 6 MG/ML
1.2 INJECTION SUBCUTANEOUS DAILY
COMMUNITY
Start: 2021-11-15 | End: 2022-04-19

## 2021-11-15 NOTE — TELEPHONE ENCOUNTER
[facility] Call Center    Phone Message    May a detailed message be left on voicemail: yes     Reason for Call: Other: Rosalva from [facility] called regarding mutual patient, she would like a nurse to call her back at [phone].     Action Taken: Message routed to:  [facility] Endo     Travel Screening: Not Applicable

## 2021-11-15 NOTE — TELEPHONE ENCOUNTER
Rosalva from [facility] [phone].     Spoke w/ Rosalva: Confirms:   liraglutide (VICTOZA PEN) 18 MG/3ML solution   11/15/2021  --   Sig - Route: Inject 1.2 mg Subcutaneous daily       Will have lipid profile drawn at [facility].   Jacquelyn Rubio RN on 11/15/2021 at 2:36 PM

## 2021-11-15 NOTE — LETTER
"11/15/2021       RE: Cristal Preciado  [address] [facility] [address]     Dear Colleague,    Thank you for referring your patient, Cristal Preciado, to the [facility]. Please see a copy of my visit note below.    HPI:   Ms. Preciado is a 72 yo woman here for follow up of type 2 diabetes.  I have been in frequent contact with Cristal Blackwell's nurse at the nursing home, due to Cristal's poor appetite, weight loss and hypoglycemia over the past several months. We have been reducing her insulin frequently.  Cristal has been losing weight and reports that she just does not have much appetite.  She reports that she hasn't been feeling good for the past year or so, since her surgery.  She reports that her stomach has been bothering her most of the time.  She sometimes has nausea.  Her lower back hurts.  She sits in her chair most of the time. She wonders if it is just \"old age.\"  She does chair exercises. Sometimes plays balloon volleyball. She has had two yeast infections recently.  She thinks it never really went away after her first treatment.      Cristal's current diabetes treatment includes:   Jardiance 10 mg daily (started 5/21)  Victoza 1.8 mg daily (several years ago)  Metformin 1000 mg daily (added in 2018)  Toujeo 45 units daily (switched from U-500 in 2019)   Her insulin requirements have come down significantly.       Recent glucose is as follows:   7:30am-   11:30am- 115-275  4:30pm-   8:00pm-     TSH: 1.05- 3/8/21  ALT: 35  A1c: 6.5%- 11/25/21  Microalbumin: 11/23/21- 4.99    No other concerns today.     PMH   Type 2 diabetes  Neuropathy   Nephropathy  Stroke 2010- mainly in wheelchair. Would like to start to walk again.    CAD, s/p stent 2009  HTN  Dyslipidemia  Cataracts  Glaucoma  GERD  Pressure sore on bottom.     Family Hx:   Mom- stroke  Dad- cancer  1 of " 12 children  2 brothers and 2 sisters-  cancer- unsure of type  1 sister with diabetes, on insulin  5 children  Son- MS, milder  Daughter, April- MS  Other kids- healthy  6 grandchildren    Social Hx:   Ms. Preciado lives at [facility].  She is seeing her family about 1-2 times a month now. Has 5 children, all now living in [facility]. Daughter has MS and is in a nursing home.  6 grandchildren.  Originally from [facility].     Current Medications  Current Outpatient Medications   Medication Sig Dispense Refill     ASPIRIN LOW DOSE 81 MG chewable tablet CHEW AND SWALLOW 1 TAB BY MOUTH ONCE DAILY IN THE MORNING  99     atorvastatin (LIPITOR) 80 MG tablet Take 1 tablet by mouth daily. Pt needs to have labs done prior to further refills. 90 tablet 0     benzocaine-menthol (CEPACOL) 15-3.6 MG lozenge Place 1 lozenge inside cheek every 2 hours as needed for moderate pain       bisacodyl (DULCOLAX) 10 MG suppository Place 10 mg rectally daily as needed for constipation       carboxymethylcellulose (REFRESH PLUS) 0.5 % SOLN 1 drop 3 times daily as needed.       CHLORTHALIDONE PO Take 25 mg by mouth every morning        dorzolamide-timolol 2-0.5 % OP ophthalmic solution Place 1 drop into both eyes 2 times daily 1 Bottle 11     DULoxetine HCl (CYMBALTA PO) Take 90 mg by mouth every morning        empagliflozin (JARDIANCE) 10 MG TABS tablet Take 1 tablet (10 mg) by mouth daily 30 tablet 11     folic acid (FOLVITE) 1 MG tablet Take 1 mg by mouth every morning        gabapentin (NEURONTIN) 300 MG capsule Take 300 mg by mouth 3 times daily       ketoconazole (NIZORAL) 2 % shampoo To entire wet scalp and ears and then wash off after 5 minutes three times a week. 240 mL 11     loperamide (IMODIUM A-D) 2 MG tablet Take 2 mg by mouth 4 times daily as needed for diarrhea       loratadine (CLARITIN) 10 MG tablet Take 10 mg by mouth as needed.       metFORMIN (GLUCOPHAGE-XR) 500 MG 24 hr tablet Take 2 tablets  (1,000 mg) by mouth daily (with dinner) 180 tablet 3     metoprolol tartrate (LOPRESSOR) 100 MG tablet Take 100 mg by mouth 2 times daily       nitroGLYCERIN (NITROSTAT) 0.4 MG SL tablet Place 0.4 mg under the tongue every 5 minutes as needed.       olopatadine HCl (PATADAY) 0.2 % SOLN Place 1 drop into both eyes daily as needed For itchy eyes 1 Bottle 5     omeprazole (PRILOSEC) 20 MG DR capsule Take 20 mg by mouth       pregabalin (LYRICA) 75 MG capsule Take 1 capsule (75 mg) by mouth 3 times daily 90 capsule 3     senna-docusate (SENOKOT-S/PERICOLACE) 8.6-50 MG tablet Take 2 tablets by mouth 2 times daily as needed for constipation 60 tablet 0     simethicone (MYLICON) 80 MG chewable tablet Take 80 mg by mouth 4 times daily       Past Medical History:   Diagnosis Date     AION (anterior ischaemic optic neuropathy), left eye     NAION LE     CAD (coronary artery disease) 3/2009    [facility]; Angio  [facility]- normal coronary arteries     Cataract      CVA (cerebral vascular accident) (H)     admitted at [facility]     on Cymbalta     Diabetes mellitus, type 2 (H)      Diabetic nephropathy (H)      Diabetic neuropathy (H)     severe     Diabetic retinopathy (H)      GERD (gastroesophageal reflux disease)      Hyperlipidemia      Hypertension     ECHO , TDS, NL EF     POAG (primary open-angle glaucoma)     adv BE     Seasonal allergies      Tubular adenoma of colon     repeat colonoscopy in        Past Surgical History:   Procedure Laterality Date     CARDIAC CATHETERIZATION       CATARACT EXTRACTION W/ INTRAOCULAR LENS IMPLANT Bilateral       SECTION        SECTION       COLONOSCOPY  7/15/2013    Tubular adenoma; repeat in 2018;Procedure: COMBINED COLONOSCOPY, SINGLE BIOPSY/POLYPECTOMY BY BIOPSY;;  Surgeon: Don King MD;  Tubular adenoma     COLONOSCOPY N/A 2020    Procedure: COLONOSCOPY;  Surgeon: Sid Amanda MD;  Location: [facility]      CORONARY STENT PLACEMENT  2004    RCA     DAVINCI HYSTERECTOMY TOTAL, BILATERAL SALPINGO-OOPHORECTOMY, COMBINED N/A 2019    Procedure: DaVinci Assisted Total Laparoscopic Hysterectomy, Removal Of Both Tubes And Ovaries;  Surgeon: Linh Cardoso MD;  Location: [facility] OR     EXTRACAPSULAR CATARACT EXTRATION WITH INTRAOCULAR LENS IMPLANT  11-10-09, 2-9-10    11-10-09 Lt, 2-9-10 Rt; Left eye 2012     HYSTERECTOMY TOTAL ABDOMINAL, BILATERAL SALPINGO-OOPHORECTOMY, COMBINED  2019    Procedure: DaVinci Assisted Total Laparoscopic Hysterectomy, Removal Of Both Tubes And Ovaries; Surgeon: Linh Cardoso MD; Location: [facility] OR       STENT, CORONARY, DELORES      RCA       Family History   Problem Relation Age of Onset     Hypertension Mother      Cerebrovascular Disease Mother      Glaucoma Father      Cancer Father      Diabetes Sister      Glaucoma Sister      Cancer - colorectal Other      Cerebrovascular Disease Other      Skin Cancer No family hx of      Melanoma No family hx of      Anesthesia Reaction No family hx of      Deep Vein Thrombosis (DVT) No family hx of      Arthritis Brother      Diabetes Sister      Glaucoma Sister        Social History     Socioeconomic History     Marital status: Single     Spouse name: Not on file     Number of children: Not on file     Years of education: Not on file     Highest education level: Not on file   Occupational History     Not on file   Social Needs     Financial resource strain: Not on file     Food insecurity:     Worry: Not on file     Inability: Not on file     Transportation needs:     Medical: Not on file     Non-medical: Not on file   Tobacco Use     Smoking status: Former Smoker     Packs/day: 1.50     Years: 45.00     Pack years: 67.50     Types: Cigarettes     Start date: 1968     Last attempt to quit: 3/6/2013     Years since quittin.4     Smokeless tobacco: Never Used   Substance and Sexual Activity     Alcohol use: Not Currently      Drug use: Never     Sexual activity: Not Currently   Lifestyle     Physical activity:     Days per week: Not on file     Minutes per session: Not on file     Stress: Not on file   Relationships     Social connections:     Talks on phone: Not on file     Gets together: Not on file     Attends Mosque service: Not on file     Active member of club or organization: Not on file     Attends meetings of clubs or organizations: Not on file     Relationship status: Not on file     Intimate partner violence:     Fear of current or ex partner: Not on file     Emotionally abused: Not on file     Physically abused: Not on file     Forced sexual activity: Not on file   Other Topics Concern     Parent/sibling w/ CABG, MI or angioplasty before 65F 55M? Not Asked   Social History Narrative    Pt has three daughters and two sons, single.  Her daughter Court, see's her often at the Nursing Home ([facility]).  Moved into Nursing Home in October 2011 after a hospitalization for ALY.  Moved from [place] in 2002 to [facility]. Has 5 grandchildren.       Physical Exam  /49   Pulse 94   GENERAL:  Alert and oriented X3, NAD, well dressed, answering questions appropriately, appears stated age.  HEENT: OP clear, no lymphadenopathy, no thyromegaly, non-tender, no exophthalmus, no proptosis, EOMI, no lid lag, no retraction  EXTREMITIES: no edema, +pulses, no rashes, no lesions  NEUROLOGY: CN grossly intact, reduced monofilament sensation, reduced vibratory sensation,  DTR upper and lower extremity  MSK: grossly intact  RESULTS  Lab Results   Component Value Date    A1C 8.1 (H) 06/17/2021    A1C 6.5 (H) 11/27/2020    A1C 6.9 (H) 03/18/2020    A1C 6.6 (H) 07/23/2019    A1C 7.0 (H) 01/10/2019    A1C 8.3 (H) 09/07/2018    A1C 7.5 (H) 10/24/2016    A1C 7.5 (H) 10/12/2015    A1C 8.1 (H) 03/06/2014    A1C 7.2 (H) 03/07/2013    HEMOGLOBINA1 6.4 11/15/2021    HEMOGLOBINA1 6.6 (A) 01/06/2020    HEMOGLOBINA1 6.5 (A) 05/24/2019     HEMOGLOBINA1 7.1 (A) 10/10/2018    HEMOGLOBINA1 7.1 (A) 03/16/2018       TSH   Date Value Ref Range Status   03/08/2021 1.05 0.30 - 5.00 uIU/mL Final   01/07/2020 1.04 0.30 - 5.00 uIU/mL Final   08/27/2019 1.32 0.30 - 5.00 uIU/mL Final   10/24/2016 1.20 0.40 - 4.00 mU/L Final   10/12/2015 1.18 0.40 - 4.00 mU/L Final   08/21/2014 1.24 0.40 - 4.00 mU/L Final     Comment:     Effective 7/30/2014, the reference range for this assay has changed to reflect   new instrumentation/methodology.     08/05/2013 1.12 0.4 - 5.0 mU/L Final   07/15/2010 0.53 0.4 - 5.0 mU/L Final     T4 Total   Date Value Ref Range Status   10/30/2008 10.6 5.0 - 11.0 ug/dL Final     T4 Free   Date Value Ref Range Status   04/21/2006 1.04 0.70 - 1.85 ng/dL Final   09/23/2005 1.58 0.70 - 1.85 ng/dL Final       ALT   Date Value Ref Range Status   03/08/2021 35 0 - 45 U/L Final   11/27/2020 25 0 - 45 U/L Final   10/12/2015 39 0 - 50 U/L Final   03/06/2014 36 0 - 50 U/L Final   ]    Recent Labs   Lab Test 11/27/20  0456 01/07/20  0530 08/27/19  0525 04/15/15  0620 08/21/14  0935   CHOL 103 118 102   < > 110   HDL 32*  --  32*   < > 43*   LDL 43  --  45   < > 43   TRIG 138  --  123   < > 122   CHOLHDLRATIO  --   --   --   --  2.6    < > = values in this interval not displayed.       Lab Results   Component Value Date     10/28/2021     08/06/2019      Lab Results   Component Value Date    POTASSIUM 3.7 10/28/2021    POTASSIUM 4.8 08/06/2019     Lab Results   Component Value Date    CHLORIDE 103 10/28/2021    CHLORIDE 104 08/06/2019     Lab Results   Component Value Date    OLAF 10.4 10/28/2021    OLAF 8.8 08/06/2019     Lab Results   Component Value Date    CO2 22 10/28/2021    CO2 19 08/06/2019     Lab Results   Component Value Date    BUN 33 10/28/2021    BUN 24 08/06/2019     Lab Results   Component Value Date    CR 1.03 10/28/2021    CR 0.98 08/06/2019       GFR Estimate   Date Value Ref Range Status   10/28/2021 56 (L) >60 mL/min/1.73m2  Final     Comment:     As of July 11, 2021, eGFR is calculated by the CKD-EPI creatinine equation, without race adjustment. eGFR can be influenced by muscle mass, exercise, and diet. The reported eGFR is an estimation only and is only applicable if the renal function is stable.   06/17/2021 47 (L) >60 mL/min/1.73m2 Final   11/23/2020 >60 >60 mL/min/1.73m2 Final   09/27/2019 44 (L) >60 mL/min/1.73m2 Final   08/06/2019 59 (L) >60 mL/min/[1.73_m2] Final     Comment:     Non  GFR Calc  Starting 12/18/2018, serum creatinine based estimated GFR (eGFR) will be   calculated using the Chronic Kidney Disease Epidemiology Collaboration   (CKD-EPI) equation.     08/05/2019 50 (L) >60 mL/min/[1.73_m2] Final     Comment:     Non  GFR Calc  Starting 12/18/2018, serum creatinine based estimated GFR (eGFR) will be   calculated using the Chronic Kidney Disease Epidemiology Collaboration   (CKD-EPI) equation.     07/23/2019 48 (L) >60 mL/min/[1.73_m2] Final     Comment:     Non  GFR Calc  Starting 12/18/2018, serum creatinine based estimated GFR (eGFR) will be   calculated using the Chronic Kidney Disease Epidemiology Collaboration   (CKD-EPI) equation.       GFR Estimate If Black   Date Value Ref Range Status   06/17/2021 57 (L) >60 mL/min/1.73m2 Final   11/23/2020 >60 >60 mL/min/1.73m2 Final   09/27/2019 53 (L) >60 mL/min/1.73m2 Final   08/06/2019 69 >60 mL/min/[1.73_m2] Final     Comment:      GFR Calc  Starting 12/18/2018, serum creatinine based estimated GFR (eGFR) will be   calculated using the Chronic Kidney Disease Epidemiology Collaboration   (CKD-EPI) equation.     08/05/2019 58 (L) >60 mL/min/[1.73_m2] Final     Comment:      GFR Calc  Starting 12/18/2018, serum creatinine based estimated GFR (eGFR) will be   calculated using the Chronic Kidney Disease Epidemiology Collaboration   (CKD-EPI) equation.     07/23/2019 56 (L) >60 mL/min/[1.73_m2]  Final     Comment:      GFR Calc  Starting 12/18/2018, serum creatinine based estimated GFR (eGFR) will be   calculated using the Chronic Kidney Disease Epidemiology Collaboration   (CKD-EPI) equation.         Lab Results   Component Value Date    MICROL <5 10/12/2015     No results found for: MICROALBUMIN  No results found for: CPEPT, GADAB, ISCAB    Vitamin B12   Date Value Ref Range Status   11/27/2020 340 213 - 816 pg/mL Final   08/31/2020 260 213 - 816 pg/mL Final   08/05/2013 506 >210 pg/mL Final     Comment:     Interp: 247-911 = Normal   ]    Most recent eye exam date: : Not Found     Care Everywhere Results:   A1c: 6.9% 3/18/20  TSH: 1.04 1/7/20  GFR 53  9/27/20  Creatinine: 1.22 9/27/20  LDL: 45 8/27/19    Assessment/Plan:      1.  Type 2 diabetes-  Ms. Garcias glucose is well controlled at present. A1c is 6.3%, however this is unreliable in the setting of anemia.  Because Cristal is having recurrent yeast infections, we decided to stop Jardiance today.  We will also reduce Victoza to 1.2 mg weekly to see if that improve her stomach discomfort/nausea.  Could consider stopping this completely in the future if her appetite remains poor.  For now, we made the following plan (instructions given to patient);     Stop Jardiance- this may be causing yeast infections.     Lower victoza to 1.2 mg daily.  This may help improve your appetite.  We may want to lower this further or stop it in the future.     Increase Toujeo to 55 units daily.     Send blood sugars in 2 weeks, sooner with low readings under 70 mg/dL.     2.  Risk factors- BP is well controlled.  She does have microalbuminuria (54.1 11/23/20).  She is on gabapentin and cymbalta for neuropathy. GFR 47 on 6/17.  TSH normal in January, 2020. LDL 47 11/2020.     3. F/U in 3 months with me, sooner with concerns.        46  minutes spent on the date of the encounter doing chart review, review of test results, review and interpretation of  glucose data, patient visit and documentation, counseling/coordination of care, and discussion of follow up plan for worsening hyper and hypoglycemia.  The patient understood and is satisfied with today's visit.     Renetta Washington PA-C, MPAS   [facility]  Department of Medicine  Division of Endocrinology and Diabetes

## 2021-11-15 NOTE — PROGRESS NOTES
"HPI:   Ms. Preciado is a 70 yo woman here for follow up of type 2 diabetes.  I have been in frequent contact with Cristal Blackwell's nurse at the nursing home, due to Cristal's poor appetite, weight loss and hypoglycemia over the past several months. We have been reducing her insulin frequently.  Cristal has been losing weight and reports that she just does not have much appetite.  She reports that she hasn't been feeling good for the past year or so, since her surgery.  She reports that her stomach has been bothering her most of the time.  She sometimes has nausea.  Her lower back hurts.  She sits in her chair most of the time. She wonders if it is just \"old age.\"  She does chair exercises. Sometimes plays balloon volleyball. She has had two yeast infections recently.  She thinks it never really went away after her first treatment.      Cristal's current diabetes treatment includes:   Jardiance 10 mg daily (started )  Victoza 1.8 mg daily (several years ago)  Metformin 1000 mg daily (added in )  Toujeo 45 units daily (switched from U-500 in 2019)   Her insulin requirements have come down significantly.       Recent glucose is as follows:   7:30am-   11:30am- 115-275  4:30pm-   8:00pm-     TSH: 1.05- 3/8/21  ALT: 35  A1c: 6.5%- 21  Microalbumin: 21- 4.99    No other concerns today.     PMH   Type 2 diabetes  Neuropathy   Nephropathy  Stroke - mainly in wheelchair. Would like to start to walk again.    CAD, s/p stent   HTN  Dyslipidemia  Cataracts  Glaucoma  GERD  Pressure sore on bottom.     Family Hx:   Mom- stroke  Dad- cancer  1 of 12 children  2 brothers and 2 sisters-  cancer- unsure of type  1 sister with diabetes, on insulin  5 children  Son- MS, milder  Daughter, April- MS  Other kids- healthy  6 grandchildren    Social Hx:   Ms. Preciado lives at [facility].  She is seeing her family about 1-2 times a month now. Has 5 children, all now living in twin " [facility]. Daughter has MS and is in a nursing home.  6 grandchildren.  Originally from [facility].     Current Medications  Current Outpatient Medications   Medication Sig Dispense Refill     ASPIRIN LOW DOSE 81 MG chewable tablet CHEW AND SWALLOW 1 TAB BY MOUTH ONCE DAILY IN THE MORNING  99     atorvastatin (LIPITOR) 80 MG tablet Take 1 tablet by mouth daily. Pt needs to have labs done prior to further refills. 90 tablet 0     benzocaine-menthol (CEPACOL) 15-3.6 MG lozenge Place 1 lozenge inside cheek every 2 hours as needed for moderate pain       bisacodyl (DULCOLAX) 10 MG suppository Place 10 mg rectally daily as needed for constipation       carboxymethylcellulose (REFRESH PLUS) 0.5 % SOLN 1 drop 3 times daily as needed.       CHLORTHALIDONE PO Take 25 mg by mouth every morning        dorzolamide-timolol 2-0.5 % OP ophthalmic solution Place 1 drop into both eyes 2 times daily 1 Bottle 11     DULoxetine HCl (CYMBALTA PO) Take 90 mg by mouth every morning        empagliflozin (JARDIANCE) 10 MG TABS tablet Take 1 tablet (10 mg) by mouth daily 30 tablet 11     folic acid (FOLVITE) 1 MG tablet Take 1 mg by mouth every morning        gabapentin (NEURONTIN) 300 MG capsule Take 300 mg by mouth 3 times daily       ketoconazole (NIZORAL) 2 % shampoo To entire wet scalp and ears and then wash off after 5 minutes three times a week. 240 mL 11     loperamide (IMODIUM A-D) 2 MG tablet Take 2 mg by mouth 4 times daily as needed for diarrhea       loratadine (CLARITIN) 10 MG tablet Take 10 mg by mouth as needed.       metFORMIN (GLUCOPHAGE-XR) 500 MG 24 hr tablet Take 2 tablets (1,000 mg) by mouth daily (with dinner) 180 tablet 3     metoprolol tartrate (LOPRESSOR) 100 MG tablet Take 100 mg by mouth 2 times daily       nitroGLYCERIN (NITROSTAT) 0.4 MG SL tablet Place 0.4 mg under the tongue every 5 minutes as needed.       olopatadine HCl (PATADAY) 0.2 % SOLN Place 1 drop into both eyes daily as needed For itchy eyes 1 Bottle 5      omeprazole (PRILOSEC) 20 MG DR capsule Take 20 mg by mouth       pregabalin (LYRICA) 75 MG capsule Take 1 capsule (75 mg) by mouth 3 times daily 90 capsule 3     senna-docusate (SENOKOT-S/PERICOLACE) 8.6-50 MG tablet Take 2 tablets by mouth 2 times daily as needed for constipation 60 tablet 0     simethicone (MYLICON) 80 MG chewable tablet Take 80 mg by mouth 4 times daily       Past Medical History:   Diagnosis Date     AION (anterior ischaemic optic neuropathy), left eye     NAION LE     CAD (coronary artery disease) 3/2009    [facility]; Angio  UM- normal coronary arteries     Cataract      CVA (cerebral vascular accident) (H)     admitted at [facility]     on Cymbalta     Diabetes mellitus, type 2 (H)      Diabetic nephropathy (H)      Diabetic neuropathy (H)     severe     Diabetic retinopathy (H)      GERD (gastroesophageal reflux disease)      Hyperlipidemia      Hypertension     ECHO , TDS, NL EF     POAG (primary open-angle glaucoma)     adv BE     Seasonal allergies      Tubular adenoma of colon     repeat colonoscopy in        Past Surgical History:   Procedure Laterality Date     CARDIAC CATHETERIZATION       CATARACT EXTRACTION W/ INTRAOCULAR LENS IMPLANT Bilateral       SECTION        SECTION       COLONOSCOPY  7/15/2013    Tubular adenoma; repeat in 2018;Procedure: COMBINED COLONOSCOPY, SINGLE BIOPSY/POLYPECTOMY BY BIOPSY;;  Surgeon: Don King MD;  Tubular adenoma     COLONOSCOPY N/A 2020    Procedure: COLONOSCOPY;  Surgeon: Sid Amanda MD;  Location:  GI     CORONARY STENT PLACEMENT  2004    RCA     DAVINCI HYSTERECTOMY TOTAL, BILATERAL SALPINGO-OOPHORECTOMY, COMBINED N/A 2019    Procedure: DaVinci Assisted Total Laparoscopic Hysterectomy, Removal Of Both Tubes And Ovaries;  Surgeon: Linh Cardoso MD;  Location:  OR     EXTRACAPSULAR CATARACT EXTRATION WITH INTRAOCULAR LENS IMPLANT  11-10-09,  2-9-10    11-10-09 Lt, 2-9-10 Rt; Left eye 2012     HYSTERECTOMY TOTAL ABDOMINAL, BILATERAL SALPINGO-OOPHORECTOMY, COMBINED  2019    Procedure: DaVinci Assisted Total Laparoscopic Hysterectomy, Removal Of Both Tubes And Ovaries; Surgeon: Linh Cardoso MD; Location: [facility] OR       STENT, CORONARY, DELORES  2009    RCA       Family History   Problem Relation Age of Onset     Hypertension Mother      Cerebrovascular Disease Mother      Glaucoma Father      Cancer Father      Diabetes Sister      Glaucoma Sister      Cancer - colorectal Other      Cerebrovascular Disease Other      Skin Cancer No family hx of      Melanoma No family hx of      Anesthesia Reaction No family hx of      Deep Vein Thrombosis (DVT) No family hx of      Arthritis Brother      Diabetes Sister      Glaucoma Sister        Social History     Socioeconomic History     Marital status: Single     Spouse name: Not on file     Number of children: Not on file     Years of education: Not on file     Highest education level: Not on file   Occupational History     Not on file   Social Needs     Financial resource strain: Not on file     Food insecurity:     Worry: Not on file     Inability: Not on file     Transportation needs:     Medical: Not on file     Non-medical: Not on file   Tobacco Use     Smoking status: Former Smoker     Packs/day: 1.50     Years: 45.00     Pack years: 67.50     Types: Cigarettes     Start date: 1968     Last attempt to quit: 3/6/2013     Years since quittin.4     Smokeless tobacco: Never Used   Substance and Sexual Activity     Alcohol use: Not Currently     Drug use: Never     Sexual activity: Not Currently   Lifestyle     Physical activity:     Days per week: Not on file     Minutes per session: Not on file     Stress: Not on file   Relationships     Social connections:     Talks on phone: Not on file     Gets together: Not on file     Attends Yazidi service: Not on file     Active member of club or  organization: Not on file     Attends meetings of clubs or organizations: Not on file     Relationship status: Not on file     Intimate partner violence:     Fear of current or ex partner: Not on file     Emotionally abused: Not on file     Physically abused: Not on file     Forced sexual activity: Not on file   Other Topics Concern     Parent/sibling w/ CABG, MI or angioplasty before 65F 55M? Not Asked   Social History Narrative    Pt has three daughters and two sons, single.  Her daughter Court, see's her often at the Nursing Home ([facility]).  Moved into Nursing Home in October 2011 after a hospitalization for ALY.  Moved from [place] in 2002 to [facility]. Has 5 grandchildren.       Physical Exam  /49   Pulse 94   GENERAL:  Alert and oriented X3, NAD, well dressed, answering questions appropriately, appears stated age.  HEENT: OP clear, no lymphadenopathy, no thyromegaly, non-tender, no exophthalmus, no proptosis, EOMI, no lid lag, no retraction  EXTREMITIES: no edema, +pulses, no rashes, no lesions  NEUROLOGY: CN grossly intact, reduced monofilament sensation, reduced vibratory sensation,  DTR upper and lower extremity  MSK: grossly intact  RESULTS  Lab Results   Component Value Date    A1C 8.1 (H) 06/17/2021    A1C 6.5 (H) 11/27/2020    A1C 6.9 (H) 03/18/2020    A1C 6.6 (H) 07/23/2019    A1C 7.0 (H) 01/10/2019    A1C 8.3 (H) 09/07/2018    A1C 7.5 (H) 10/24/2016    A1C 7.5 (H) 10/12/2015    A1C 8.1 (H) 03/06/2014    A1C 7.2 (H) 03/07/2013    HEMOGLOBINA1 6.4 11/15/2021    HEMOGLOBINA1 6.6 (A) 01/06/2020    HEMOGLOBINA1 6.5 (A) 05/24/2019    HEMOGLOBINA1 7.1 (A) 10/10/2018    HEMOGLOBINA1 7.1 (A) 03/16/2018       TSH   Date Value Ref Range Status   03/08/2021 1.05 0.30 - 5.00 uIU/mL Final   01/07/2020 1.04 0.30 - 5.00 uIU/mL Final   08/27/2019 1.32 0.30 - 5.00 uIU/mL Final   10/24/2016 1.20 0.40 - 4.00 mU/L Final   10/12/2015 1.18 0.40 - 4.00 mU/L Final   08/21/2014 1.24 0.40 - 4.00 mU/L Final      Comment:     Effective 7/30/2014, the reference range for this assay has changed to reflect   new instrumentation/methodology.     08/05/2013 1.12 0.4 - 5.0 mU/L Final   07/15/2010 0.53 0.4 - 5.0 mU/L Final     T4 Total   Date Value Ref Range Status   10/30/2008 10.6 5.0 - 11.0 ug/dL Final     T4 Free   Date Value Ref Range Status   04/21/2006 1.04 0.70 - 1.85 ng/dL Final   09/23/2005 1.58 0.70 - 1.85 ng/dL Final       ALT   Date Value Ref Range Status   03/08/2021 35 0 - 45 U/L Final   11/27/2020 25 0 - 45 U/L Final   10/12/2015 39 0 - 50 U/L Final   03/06/2014 36 0 - 50 U/L Final   ]    Recent Labs   Lab Test 11/27/20  0456 01/07/20  0530 08/27/19  0525 04/15/15  0620 08/21/14  0935   CHOL 103 118 102   < > 110   HDL 32*  --  32*   < > 43*   LDL 43  --  45   < > 43   TRIG 138  --  123   < > 122   CHOLHDLRATIO  --   --   --   --  2.6    < > = values in this interval not displayed.       Lab Results   Component Value Date     10/28/2021     08/06/2019      Lab Results   Component Value Date    POTASSIUM 3.7 10/28/2021    POTASSIUM 4.8 08/06/2019     Lab Results   Component Value Date    CHLORIDE 103 10/28/2021    CHLORIDE 104 08/06/2019     Lab Results   Component Value Date    OLAF 10.4 10/28/2021    OLAF 8.8 08/06/2019     Lab Results   Component Value Date    CO2 22 10/28/2021    CO2 19 08/06/2019     Lab Results   Component Value Date    BUN 33 10/28/2021    BUN 24 08/06/2019     Lab Results   Component Value Date    CR 1.03 10/28/2021    CR 0.98 08/06/2019       GFR Estimate   Date Value Ref Range Status   10/28/2021 56 (L) >60 mL/min/1.73m2 Final     Comment:     As of July 11, 2021, eGFR is calculated by the CKD-EPI creatinine equation, without race adjustment. eGFR can be influenced by muscle mass, exercise, and diet. The reported eGFR is an estimation only and is only applicable if the renal function is stable.   06/17/2021 47 (L) >60 mL/min/1.73m2 Final   11/23/2020 >60 >60 mL/min/1.73m2  Final   09/27/2019 44 (L) >60 mL/min/1.73m2 Final   08/06/2019 59 (L) >60 mL/min/[1.73_m2] Final     Comment:     Non  GFR Calc  Starting 12/18/2018, serum creatinine based estimated GFR (eGFR) will be   calculated using the Chronic Kidney Disease Epidemiology Collaboration   (CKD-EPI) equation.     08/05/2019 50 (L) >60 mL/min/[1.73_m2] Final     Comment:     Non  GFR Calc  Starting 12/18/2018, serum creatinine based estimated GFR (eGFR) will be   calculated using the Chronic Kidney Disease Epidemiology Collaboration   (CKD-EPI) equation.     07/23/2019 48 (L) >60 mL/min/[1.73_m2] Final     Comment:     Non  GFR Calc  Starting 12/18/2018, serum creatinine based estimated GFR (eGFR) will be   calculated using the Chronic Kidney Disease Epidemiology Collaboration   (CKD-EPI) equation.       GFR Estimate If Black   Date Value Ref Range Status   06/17/2021 57 (L) >60 mL/min/1.73m2 Final   11/23/2020 >60 >60 mL/min/1.73m2 Final   09/27/2019 53 (L) >60 mL/min/1.73m2 Final   08/06/2019 69 >60 mL/min/[1.73_m2] Final     Comment:      GFR Calc  Starting 12/18/2018, serum creatinine based estimated GFR (eGFR) will be   calculated using the Chronic Kidney Disease Epidemiology Collaboration   (CKD-EPI) equation.     08/05/2019 58 (L) >60 mL/min/[1.73_m2] Final     Comment:      GFR Calc  Starting 12/18/2018, serum creatinine based estimated GFR (eGFR) will be   calculated using the Chronic Kidney Disease Epidemiology Collaboration   (CKD-EPI) equation.     07/23/2019 56 (L) >60 mL/min/[1.73_m2] Final     Comment:      GFR Calc  Starting 12/18/2018, serum creatinine based estimated GFR (eGFR) will be   calculated using the Chronic Kidney Disease Epidemiology Collaboration   (CKD-EPI) equation.         Lab Results   Component Value Date    MICROL <5 10/12/2015     No results found for: MICROALBUMIN  No results found for: CPEPT, GADAB,  ISCAB    Vitamin B12   Date Value Ref Range Status   11/27/2020 340 213 - 816 pg/mL Final   08/31/2020 260 213 - 816 pg/mL Final   08/05/2013 506 >210 pg/mL Final     Comment:     Interp: 247-911 = Normal   ]    Most recent eye exam date: : Not Found     Care Everywhere Results:   A1c: 6.9% 3/18/20  TSH: 1.04 1/7/20  GFR 53  9/27/20  Creatinine: 1.22 9/27/20  LDL: 45 8/27/19    Assessment/Plan:      1.  Type 2 diabetes-  Ms. Preciado's glucose is well controlled at present. A1c is 6.3%, however this is unreliable in the setting of anemia.  Because Cristal is having recurrent yeast infections, we decided to stop Jardiance today.  We will also reduce Victoza to 1.2 mg weekly to see if that improve her stomach discomfort/nausea.  Could consider stopping this completely in the future if her appetite remains poor.  For now, we made the following plan (instructions given to patient);     Stop Jardiance- this may be causing yeast infections.     Lower victoza to 1.2 mg daily.  This may help improve your appetite.  We may want to lower this further or stop it in the future.     Increase Toujeo to 55 units daily.     Send blood sugars in 2 weeks, sooner with low readings under 70 mg/dL.     2.  Risk factors- BP is well controlled.  She does have microalbuminuria (54.1 11/23/20).  She is on gabapentin and cymbalta for neuropathy. GFR 47 on 6/17.  TSH normal in January, 2020. LDL 47 11/2020.     3. F/U in 3 months with me, sooner with concerns.        46  minutes spent on the date of the encounter doing chart review, review of test results, review and interpretation of glucose data, patient visit and documentation, counseling/coordination of care, and discussion of follow up plan for worsening hyper and hypoglycemia.  The patient understood and is satisfied with today's visit.     Renetta Washington PA-C, MPAS   [facility]  Department of Medicine  Division of Endocrinology and Diabetes

## 2021-11-15 NOTE — PATIENT INSTRUCTIONS
Stop Jardiance- this may be causing yeast infections.     Lower victoza to 1.2 mg daily.  This may help improve your appetite.  We may want to lower this further or stop it in the future.     Increase Toujeo to 55 units daily.     Send blood sugars in 2 weeks, sooner with low readings under 70 mg/dL.    Schedule eye appointment.

## 2021-11-17 ENCOUNTER — LAB REQUISITION (OUTPATIENT)
Dept: LAB | Facility: CLINIC | Age: 71
End: 2021-11-17
Payer: COMMERCIAL

## 2021-11-17 DIAGNOSIS — E11.40 TYPE 2 DIABETES MELLITUS WITH DIABETIC NEUROPATHY, UNSPECIFIED (H): ICD-10-CM

## 2021-11-18 ENCOUNTER — TRANSFERRED RECORDS (OUTPATIENT)
Dept: HEALTH INFORMATION MANAGEMENT | Facility: CLINIC | Age: 71
End: 2021-11-18

## 2021-11-18 LAB
CHOLEST SERPL-MCNC: 92 MG/DL
FASTING STATUS PATIENT QL REPORTED: ABNORMAL
HDLC SERPL-MCNC: 26 MG/DL
LDLC SERPL CALC-MCNC: 30 MG/DL
TRIGL SERPL-MCNC: 180 MG/DL

## 2021-11-18 PROCEDURE — 36415 COLL VENOUS BLD VENIPUNCTURE: CPT | Mod: ORL | Performed by: FAMILY MEDICINE

## 2021-11-18 PROCEDURE — 80061 LIPID PANEL: CPT | Mod: ORL | Performed by: FAMILY MEDICINE

## 2021-11-18 PROCEDURE — P9604 ONE-WAY ALLOW PRORATED TRIP: HCPCS | Mod: ORL | Performed by: FAMILY MEDICINE

## 2021-11-19 ENCOUNTER — LAB REQUISITION (OUTPATIENT)
Dept: LAB | Facility: CLINIC | Age: 71
End: 2021-11-19
Payer: COMMERCIAL

## 2021-11-19 DIAGNOSIS — Z11.59 ENCOUNTER FOR SCREENING FOR OTHER VIRAL DISEASES: ICD-10-CM

## 2021-11-19 PROCEDURE — U0005 INFEC AGEN DETEC AMPLI PROBE: HCPCS | Mod: ORL | Performed by: FAMILY MEDICINE

## 2021-11-21 LAB — SARS-COV-2 RNA RESP QL NAA+PROBE: NEGATIVE

## 2021-11-23 ENCOUNTER — NURSING HOME VISIT (OUTPATIENT)
Dept: GERIATRICS | Facility: CLINIC | Age: 71
End: 2021-11-23
Payer: COMMERCIAL

## 2021-11-23 VITALS
HEART RATE: 60 BPM | BODY MASS INDEX: 27.63 KG/M2 | DIASTOLIC BLOOD PRESSURE: 78 MMHG | WEIGHT: 156 LBS | SYSTOLIC BLOOD PRESSURE: 130 MMHG | OXYGEN SATURATION: 100 % | RESPIRATION RATE: 18 BRPM | TEMPERATURE: 98 F

## 2021-11-23 DIAGNOSIS — I10 ESSENTIAL HYPERTENSION: ICD-10-CM

## 2021-11-23 DIAGNOSIS — Z79.4 TYPE 2 DIABETES MELLITUS WITH OTHER SPECIFIED COMPLICATION, WITH LONG-TERM CURRENT USE OF INSULIN (H): Primary | ICD-10-CM

## 2021-11-23 DIAGNOSIS — E11.69 TYPE 2 DIABETES MELLITUS WITH OTHER SPECIFIED COMPLICATION, WITH LONG-TERM CURRENT USE OF INSULIN (H): Primary | ICD-10-CM

## 2021-11-23 DIAGNOSIS — G62.9 NEUROPATHY: ICD-10-CM

## 2021-11-23 DIAGNOSIS — Z86.73 HISTORY OF STROKE: ICD-10-CM

## 2021-11-23 PROCEDURE — U0003 INFECTIOUS AGENT DETECTION BY NUCLEIC ACID (DNA OR RNA); SEVERE ACUTE RESPIRATORY SYNDROME CORONAVIRUS 2 (SARS-COV-2) (CORONAVIRUS DISEASE [COVID-19]), AMPLIFIED PROBE TECHNIQUE, MAKING USE OF HIGH THROUGHPUT TECHNOLOGIES AS DESCRIBED BY CMS-2020-01-R: HCPCS | Mod: ORL | Performed by: FAMILY MEDICINE

## 2021-11-23 PROCEDURE — 99309 SBSQ NF CARE MODERATE MDM 30: CPT | Performed by: FAMILY MEDICINE

## 2021-11-23 NOTE — LETTER
11/23/2021        RE: Cristal Preciado  [facility]  [address]  [facility] [address]                  [facility] GERIATRIC SERVICES    [place] Medical Record Number:  [id number]  Place of Service where encounter took place: [facility] () [[id number]]   CODE STATUS:   CPR/Full code     Chief Complaint:  Chief Complaint   Patient presents with     [facility]     [facility] 11/23/2021. DM, HTN, neuropathy, chronic pain, general health decline.        HPI:   Cristal is a 71 y.o. female seen for routine physician follow up in [facility] at [facility]. She has been a resident here since October 2011. She does have multiple complex co morbidities. She is treated for hypertension and has insulin-dependent diabetes mellitus. She sees an endocrinologist. She has chronic neuropathy, chronic kidney disease and is treated with Cymbalta for depression and chronic pain. She is on gabapentin and lyrica for neuropathy pain. She has chronic weakness in her lower extremities and is wheelchair bound. She has chronic urinary incontinence. Hospitalized in April 2018 for chest pain. It is noted she has coronary artery disease having had PCI with stent placement in 2009. Her chest pain was reproducible on exam. Negative 3 sets of troponin. EKG showed no significant ischemic changes. Pharmacological stress was negative for inducible ischemia so no intervention was required. She has periodic follow up with cardiology. She was admitted to the [facility] on 8/5/19 for planned hysterectomy. She had been experiencing brownish vaginal discharge, referred to gynecology with endometrial biopsy showing atypia. Her hospital course was uneventful, she underwent davinci assisted total laparoscopic hysterectomy with bilateral salping oophorectomy. She returned to [facility] on 8/6/19.      Today:  She saw endocrine in May 2021 and started on Jardiance. She is also on victoza, metformin and toujeo.  She had follow up on 11/15/2021. Due to general decline, less insulin requirements, her meds were adjusted. Jardiance was stopped, felt to be contributing to recent yeast infection, victoza decreased from 1.8 to 1.2, toujeo increased from 45 to 55 and metformin continued at 1000 mg once daily. It is noted she has generally been in declining health for over a year, weight loss, poor appetite, malaise. Accuchecks followed closely. She has not been acutely ill recently with cough, cold or congestion. No new concerns per nursing. She is wheelchair bound, does not ambulate. Requires maximum assistance from LTC staff. She needs assist with cares due to neuropathy and difficulty using her hands. BPs satisfactory, treated with metoprolol and chlorthalidone for HTN. She is on Cymbalta for mood and also chronic pain. She has no current open areas of her skin though noted areas of gluteal folds and coccyx are fragile.       Past Medical History:  Past Medical History:   Diagnosis Date     AION (anterior ischaemic optic neuropathy), left eye     NAION LE     CAD (coronary artery disease) 3/2009    [facility]; Angio 2013 [facility]- normal coronary arteries     Cataract      CVA (cerebral vascular accident) (H)     admitted at [facility]     on Cymbalta     Diabetes mellitus, type 2 (H)      Diabetic nephropathy (H)      Diabetic neuropathy (H)     severe     Diabetic retinopathy (H)      GERD (gastroesophageal reflux disease)      Hyperlipidemia      Hypertension     ECHO 2013, TDS, NL EF     POAG (primary open-angle glaucoma)     adv BE     Seasonal allergies      Tubular adenoma of colon 2013    repeat colonoscopy in 2018       Medications:  Current Outpatient Medications   Medication Instructions     acetaminophen (TYLENOL) 1,000 mg, Oral, 3 times daily     aspirin 81 mg, DAILY     atorvastatin (LIPITOR) 80 mg, Bedtime     benzocaine-menthoL (CEPACOL) 15-3.6 mg 1 lozenge, Oral, Every 2 hours PRN     bisacodyL  (DULCOLAX) 10 mg, Rectal, Daily PRN     carboxymethylcellulose (REFRESH PLUS) 0.5 % Dpet ophthalmic dropperette 1 drop, Both Eyes, 3 times daily     chlorthalidone (HYGROTEN) 25 mg, Oral, DAILY     dorzolamide-timolol (COSOPT) 22.3-6.8 mg/mL ophthalmic solution 1 drop, 2 times daily     DULoxetine (CYMBALTA) 90 mg, Oral, DAILY     folic acid (FOLVITE) 1 mg, DAILY     gabapentin (NEURONTIN) 300 mg, Oral, 3 times daily   * insulin glargine U-300 conc (TOUJEO MAX U-300 SOLOSTAR) 55 units daily     ketoconazole (NIZORAL) 2 % shampoo 1 application, Topical, 2 times weekly, Apply to damp skin, lather, leave on 5 minutes, and rinse. Apply on Wednesdays and Saturdays.     latanoprost (XALATAN) 0.005 % ophthalmic solution 1 drop, Bedtime     liraglutide (VICTOZA) 1.2 mg, DAILY     loratadine (CLARITIN) 10 mg, Daily PRN     metFORMIN (GLUCOPHAGE-XR) 1,000 mg, Oral, QDaily     metoprolol tartrate (LOPRESSOR) 100 mg, Oral, 2 times daily     nitroglycerin (NITROSTAT) 0.4 mg, Every 5 min PRN     olopatadine 0.2 % Drop 1 drop, Ophthalmic, Daily PRN     omeprazole (PRILOSEC) 20 mg, Oral, every morning     pregabalin (LYRICA) 75 mg, Oral, 3 times daily     senna-docusate (SENNOSIDES-DOCUSATE SODIUM) 8.6-50 mg tablet 2 tablets, Oral, 2 times daily PRN     simethicone (MYLICON) 80 mg, Oral, 4 times daily, After meals and at HS      triamcinolone (KENALOG) 0.1 % ointment 1 application, Topical, DAILY, Apply to legs in the morning  And to right ear two times a day prn       Physical Exam:   General: Patient is alert female, no distress.   Vitals: /78   Pulse 60   Temp 98  F (36.7  C)   Resp 18   Wt 70.8 kg (156 lb)   SpO2 100%   BMI 27.63 kg/m    HEENT: Head is NCAT. Eyes show no injection or icterus. Nares negative. Oropharynx well hydrated.  CV: Non labored respirations.   : Deferred.  Extremities: Mild LE edema is noted.  Musculoskeletal: Deformities small joints hands.  Psych: Mood appears good.      Labs:  Lab Results    Component Value Date    HGBA1C 7.0 (H) 01/10/2019     Lab Results   Component Value Date    HGBA1C 6.9 (H) 03/18/2020     Lab Results   Component Value Date    HGBA1C 6.5 (H) 11/27/2020     Component      Latest Ref Rng & Units 11/15/2021             Hemoglobin A1C      4.3 - <5.7 % 6.4     Lab Results   Component Value Date    WBC 4.2 02/22/2021    HGB 10.5 (L) 02/22/2021    HCT 33.1 (L) 02/22/2021    MCV 97 02/22/2021     02/22/2021     Results for orders placed or performed in visit on 11/23/20   Basic Metabolic Panel   Result Value Ref Range    Sodium 137 136 - 145 mmol/L    Potassium 4.1 3.5 - 5.0 mmol/L    Chloride 103 98 - 107 mmol/L    CO2 23 22 - 31 mmol/L    Anion Gap, Calculation 11 5 - 18 mmol/L    Glucose 171 (H) 70 - 125 mg/dL    Calcium 10.0 8.5 - 10.5 mg/dL    BUN 17 8 - 22 mg/dL    Creatinine 0.91 0.60 - 1.10 mg/dL    GFR MDRD Af Amer >60 >60 mL/min/1.73m2    GFR MDRD Non Af Amer >60 >60 mL/min/1.73m2     Lab Results   Component Value Date    TSH 1.05 03/08/2021         Assessment/Plan:  1. IDDM. She last saw endocrinology in May 2021 and started jardiance in addition to insulin toujeo, victoza and metformin. Follow up visit 11/15/2021 with discontinue jardiance, increase toujeo and decrease victoza, cont metformin. Last A1c on 11/15/2021 was good at 6.4%.   2. HTN. She is treated with metoprolol and chlorthalidone. Overall BPs acceptable, no need for changes.   3. Neuropathy. Chronic pain controlled with gabapentin, lyrica and Cymbalta.  4. Hx of stroke. Wheelchair bound, non ambulatory. On aspirin, statin.  5. Glaucoma. Visual impairment at baseline.  6. Depression. Continue Cymbalta.  7. CKD. Labs as noted above.  8. Anemia. Last hgb at 10.5, stable.        Electronically signed by: Meagan Valdez MD             Sincerely,        Meagan Valdez MD

## 2021-11-24 ENCOUNTER — LAB REQUISITION (OUTPATIENT)
Dept: LAB | Facility: CLINIC | Age: 71
End: 2021-11-24
Payer: COMMERCIAL

## 2021-11-24 DIAGNOSIS — Z11.59 ENCOUNTER FOR SCREENING FOR OTHER VIRAL DISEASES: ICD-10-CM

## 2021-11-25 LAB — SARS-COV-2 RNA RESP QL NAA+PROBE: NEGATIVE

## 2021-11-25 NOTE — PROGRESS NOTES
[facility] GERIATRIC SERVICES    [place] Medical Record Number:  [id number]  Place of Service where encounter took place: [facility] () [[id number]]   CODE STATUS:   CPR/Full code     Chief Complaint:  Chief Complaint   Patient presents with     [facility]     LT 11/23/2021. DM, HTN, neuropathy, chronic pain, general health decline.        HPI:   Cristal is a 71 y.o. female seen for routine physician follow up in LT at [facility]. She has been a resident here since October 2011. She does have multiple complex co morbidities. She is treated for hypertension and has insulin-dependent diabetes mellitus. She sees an endocrinologist. She has chronic neuropathy, chronic kidney disease and is treated with Cymbalta for depression and chronic pain. She is on gabapentin and lyrica for neuropathy pain. She has chronic weakness in her lower extremities and is wheelchair bound. She has chronic urinary incontinence. Hospitalized in April 2018 for chest pain. It is noted she has coronary artery disease having had PCI with stent placement in 2009. Her chest pain was reproducible on exam. Negative 3 sets of troponin. EKG showed no significant ischemic changes. Pharmacological stress was negative for inducible ischemia so no intervention was required. She has periodic follow up with cardiology. She was admitted to the [facility] on 8/5/19 for planned hysterectomy. She had been experiencing brownish vaginal discharge, referred to gynecology with endometrial biopsy showing atypia. Her hospital course was uneventful, she underwent davinci assisted total laparoscopic hysterectomy with bilateral salping oophorectomy. She returned to [facility] on 8/6/19.      Today:  She saw endocrine in May 2021 and started on Jardiance. She is also on victoza, metformin and toujeo. She had follow up on 11/15/2021. Due to general decline, less insulin requirements, her meds were adjusted. Jardiance was  stopped, felt to be contributing to recent yeast infection, victoza decreased from 1.8 to 1.2, toujeo increased from 45 to 55 and metformin continued at 1000 mg once daily. It is noted she has generally been in declining health for over a year, weight loss, poor appetite, malaise. Accuchecks followed closely. She has not been acutely ill recently with cough, cold or congestion. No new concerns per nursing. She is wheelchair bound, does not ambulate. Requires maximum assistance from LTC staff. She needs assist with cares due to neuropathy and difficulty using her hands. BPs satisfactory, treated with metoprolol and chlorthalidone for HTN. She is on Cymbalta for mood and also chronic pain. She has no current open areas of her skin though noted areas of gluteal folds and coccyx are fragile.       Past Medical History:  Past Medical History:   Diagnosis Date     AION (anterior ischaemic optic neuropathy), left eye     NAION LE     CAD (coronary artery disease) 3/2009    [facility]; Angio 2013 UM- normal coronary arteries     Cataract      CVA (cerebral vascular accident) (H)     admitted at [facility]     on Cymbalta     Diabetes mellitus, type 2 (H)      Diabetic nephropathy (H)      Diabetic neuropathy (H)     severe     Diabetic retinopathy (H)      GERD (gastroesophageal reflux disease)      Hyperlipidemia      Hypertension     ECHO 2013, TDS, NL EF     POAG (primary open-angle glaucoma)     adv BE     Seasonal allergies      Tubular adenoma of colon 2013    repeat colonoscopy in 2018       Medications:  Current Outpatient Medications   Medication Instructions     acetaminophen (TYLENOL) 1,000 mg, Oral, 3 times daily     aspirin 81 mg, DAILY     atorvastatin (LIPITOR) 80 mg, Bedtime     benzocaine-menthoL (CEPACOL) 15-3.6 mg 1 lozenge, Oral, Every 2 hours PRN     bisacodyL (DULCOLAX) 10 mg, Rectal, Daily PRN     carboxymethylcellulose (REFRESH PLUS) 0.5 % Dpet ophthalmic dropperette 1 drop,  Both Eyes, 3 times daily     chlorthalidone (HYGROTEN) 25 mg, Oral, DAILY     dorzolamide-timolol (COSOPT) 22.3-6.8 mg/mL ophthalmic solution 1 drop, 2 times daily     DULoxetine (CYMBALTA) 90 mg, Oral, DAILY     folic acid (FOLVITE) 1 mg, DAILY     gabapentin (NEURONTIN) 300 mg, Oral, 3 times daily   * insulin glargine U-300 conc (TOUJEO MAX U-300 SOLOSTAR) 55 units daily     ketoconazole (NIZORAL) 2 % shampoo 1 application, Topical, 2 times weekly, Apply to damp skin, lather, leave on 5 minutes, and rinse. Apply on Wednesdays and Saturdays.     latanoprost (XALATAN) 0.005 % ophthalmic solution 1 drop, Bedtime     liraglutide (VICTOZA) 1.2 mg, DAILY     loratadine (CLARITIN) 10 mg, Daily PRN     metFORMIN (GLUCOPHAGE-XR) 1,000 mg, Oral, QDaily     metoprolol tartrate (LOPRESSOR) 100 mg, Oral, 2 times daily     nitroglycerin (NITROSTAT) 0.4 mg, Every 5 min PRN     olopatadine 0.2 % Drop 1 drop, Ophthalmic, Daily PRN     omeprazole (PRILOSEC) 20 mg, Oral, every morning     pregabalin (LYRICA) 75 mg, Oral, 3 times daily     senna-docusate (SENNOSIDES-DOCUSATE SODIUM) 8.6-50 mg tablet 2 tablets, Oral, 2 times daily PRN     simethicone (MYLICON) 80 mg, Oral, 4 times daily, After meals and at HS      triamcinolone (KENALOG) 0.1 % ointment 1 application, Topical, DAILY, Apply to legs in the morning  And to right ear two times a day prn       Physical Exam:   General: Patient is alert female, no distress.   Vitals: /78   Pulse 60   Temp 98  F (36.7  C)   Resp 18   Wt 70.8 kg (156 lb)   SpO2 100%   BMI 27.63 kg/m    HEENT: Head is NCAT. Eyes show no injection or icterus. Nares negative. Oropharynx well hydrated.  CV: Non labored respirations.   : Deferred.  Extremities: Mild LE edema is noted.  Musculoskeletal: Deformities small joints hands.  Psych: Mood appears good.      Labs:  Lab Results   Component Value Date    HGBA1C 7.0 (H) 01/10/2019     Lab Results   Component Value Date    HGBA1C 6.9 (H) 03/18/2020      Lab Results   Component Value Date    HGBA1C 6.5 (H) 11/27/2020     Component      Latest Ref Rng & Units 11/15/2021             Hemoglobin A1C      4.3 - <5.7 % 6.4     Lab Results   Component Value Date    WBC 4.2 02/22/2021    HGB 10.5 (L) 02/22/2021    HCT 33.1 (L) 02/22/2021    MCV 97 02/22/2021     02/22/2021     Results for orders placed or performed in visit on 11/23/20   Basic Metabolic Panel   Result Value Ref Range    Sodium 137 136 - 145 mmol/L    Potassium 4.1 3.5 - 5.0 mmol/L    Chloride 103 98 - 107 mmol/L    CO2 23 22 - 31 mmol/L    Anion Gap, Calculation 11 5 - 18 mmol/L    Glucose 171 (H) 70 - 125 mg/dL    Calcium 10.0 8.5 - 10.5 mg/dL    BUN 17 8 - 22 mg/dL    Creatinine 0.91 0.60 - 1.10 mg/dL    GFR MDRD Af Amer >60 >60 mL/min/1.73m2    GFR MDRD Non Af Amer >60 >60 mL/min/1.73m2     Lab Results   Component Value Date    TSH 1.05 03/08/2021         Assessment/Plan:  1. IDDM. She last saw endocrinology in May 2021 and started jardiance in addition to insulin toujeo, victoza and metformin. Follow up visit 11/15/2021 with discontinue jardiance, increase toujeo and decrease victoza, cont metformin. Last A1c on 11/15/2021 was good at 6.4%.   2. HTN. She is treated with metoprolol and chlorthalidone. Overall BPs acceptable, no need for changes.   3. Neuropathy. Chronic pain controlled with gabapentin, lyrica and Cymbalta.  4. Hx of stroke. Wheelchair bound, non ambulatory. On aspirin, statin.  5. Glaucoma. Visual impairment at baseline.  6. Depression. Continue Cymbalta.  7. CKD. Labs as noted above.  8. Anemia. Last hgb at 10.5, stable.        Electronically signed by: Meagan Valdez MD

## 2021-11-25 NOTE — PROGRESS NOTES
[facility] SERVICES    [place] Medical Record Number:  [id number]  Place of Service where encounter took place: [facility] () [[id number]]   CODE STATUS:   CPR/Full code     Chief Complaint:  Chief Complaint   Patient presents with     long-term Regulatory     LTC 7/31/2021. DM, HTN, chronic pain and neuropathy.        HPI:   Cristal is a 71 y.o. female seen for routine physician follow up in LT at [facility]. She has been a resident here since October 2011. She does have multiple complex co morbidities. She is treated for hypertension and has insulin-dependent diabetes mellitus. She sees an endocrinologist. She has chronic neuropathy, chronic kidney disease and is treated with Cymbalta for depression and chronic pain. She is on gabapentin and lyrica for neuropathy pain. She has chronic weakness in her lower extremities and is wheelchair bound. She has chronic urinary incontinence. Hospitalized in April 2018 for chest pain. It is noted she has coronary artery disease having had PCI with stent placement in 2009. Her chest pain was reproducible on exam. Negative 3 sets of troponin. EKG showed no significant ischemic changes. Pharmacological stress was negative for inducible ischemia so no intervention was required. She has periodic follow up with cardiology. She was admitted to the [facility] on 8/5/19 for planned hysterectomy. She had been experiencing brownish vaginal discharge, referred to gynecology with endometrial biopsy showing atypia. Her hospital course was uneventful, she underwent davinci assisted total laparoscopic hysterectomy with bilateral salping oophorectomy. She returned to [facility] on 8/6/19.      Today:  She saw endocrine in May and started on jardiance. She is also on victoza, metformin and toujeo. Accuchecks followed closely. She has not been ill recently with cough, cold or congestion. No new concerns per nursing. She is wheelchair bound, does  not ambulate. Requires maximum assistance form LTC staff. She needs assist with cares due to neuropathy and difficulty using her hands. BPs satisfactory, treated with metoprolol and chlorthalidone for HTN. She is on Cymbalta for mood and also chronic pain. She has no current open areas of her skin though noted areas of gluteal folds and coccyx are fragile.       Past Medical History:  Past Medical History:   Diagnosis Date     AION (anterior ischaemic optic neuropathy), left eye     NAION LE     CAD (coronary artery disease) 3/2009    [facility]; Angio 2013 [facility]- normal coronary arteries     Cataract      CVA (cerebral vascular accident) (H)     admitted at [facility]     on Cymbalta     Diabetes mellitus, type 2 (H)      Diabetic nephropathy (H)      Diabetic neuropathy (H)     severe     Diabetic retinopathy (H)      GERD (gastroesophageal reflux disease)      Hyperlipidemia      Hypertension     ECHO 2013, TDS, NL EF     POAG (primary open-angle glaucoma)     adv BE     Seasonal allergies      Tubular adenoma of colon 2013    repeat colonoscopy in 2018       Medications:  Current Outpatient Medications   Medication Instructions     acetaminophen (TYLENOL) 1,000 mg, Oral, 3 times daily     aspirin 81 mg, DAILY     atorvastatin (LIPITOR) 80 mg, Bedtime     benzocaine-menthoL (CEPACOL) 15-3.6 mg 1 lozenge, Oral, Every 2 hours PRN     bisacodyL (DULCOLAX) 10 mg, Rectal, Daily PRN     carboxymethylcellulose (REFRESH PLUS) 0.5 % Dpet ophthalmic dropperette 1 drop, Both Eyes, 3 times daily     chlorthalidone (HYGROTEN) 25 mg, Oral, DAILY     dorzolamide-timolol (COSOPT) 22.3-6.8 mg/mL ophthalmic solution 1 drop, 2 times daily     DULoxetine (CYMBALTA) 90 mg, Oral, DAILY     folic acid (FOLVITE) 1 mg, DAILY     gabapentin (NEURONTIN) 300 mg, Oral, 3 times daily          * insulin glargine U-300 conc (TOUJEO MAX U-300 SOLOSTAR)    Jardiance 45 units from daily      10 mg daily     ketoconazole  (NIZORAL) 2 % shampoo 1 application, Topical, 2 times weekly, Apply to damp skin, lather, leave on 5 minutes, and rinse. Apply on Wednesdays and Saturdays.     latanoprost (XALATAN) 0.005 % ophthalmic solution 1 drop, Bedtime     liraglutide (VICTOZA) 1.8 mg, DAILY     loratadine (CLARITIN) 10 mg, Daily PRN     metFORMIN (GLUCOPHAGE-XR) 1,000 mg, Oral, QDaily     metoprolol tartrate (LOPRESSOR) 100 mg, Oral, 2 times daily     nitroglycerin (NITROSTAT) 0.4 mg, Every 5 min PRN     olopatadine 0.2 % Drop 1 drop, Ophthalmic, Daily PRN     omeprazole (PRILOSEC) 20 mg, Oral, every morning     pregabalin (LYRICA) 75 mg, Oral, 3 times daily     senna-docusate (SENNOSIDES-DOCUSATE SODIUM) 8.6-50 mg tablet 2 tablets, Oral, 2 times daily PRN     simethicone (MYLICON) 80 mg, Oral, 4 times daily, After meals and at HS      triamcinolone (KENALOG) 0.1 % ointment 1 application, Topical, DAILY, Apply to legs in the morning  And to right ear two times a day prn       Physical Exam:   General: Patient is alert female, no distress.   Vitals: /64, Temp 98.3, Pulse 68, RR 18, O2 sat 98 % RA.  HEENT: Head is NCAT. Eyes show no injection or icterus. Nares negative. Oropharynx well hydrated.  CV: Non labored respirations.   : Deferred.  Extremities: Mild LE edema is noted.  Musculoskeletal: Deformities small joints hands.  Psych: Mood appears good.      Labs:  Lab Results   Component Value Date    HGBA1C 7.0 (H) 01/10/2019     Lab Results   Component Value Date    HGBA1C 6.9 (H) 03/18/2020     Lab Results   Component Value Date    HGBA1C 6.5 (H) 11/27/2020     Lab Results   Component Value Date    WBC 4.2 02/22/2021    HGB 10.5 (L) 02/22/2021    HCT 33.1 (L) 02/22/2021    MCV 97 02/22/2021     02/22/2021     Results for orders placed or performed in visit on 11/23/20   Basic Metabolic Panel   Result Value Ref Range    Sodium 137 136 - 145 mmol/L    Potassium 4.1 3.5 - 5.0 mmol/L    Chloride 103 98 - 107 mmol/L    CO2 23 22 -  31 mmol/L    Anion Gap, Calculation 11 5 - 18 mmol/L    Glucose 171 (H) 70 - 125 mg/dL    Calcium 10.0 8.5 - 10.5 mg/dL    BUN 17 8 - 22 mg/dL    Creatinine 0.91 0.60 - 1.10 mg/dL    GFR MDRD Af Amer >60 >60 mL/min/1.73m2    GFR MDRD Non Af Amer >60 >60 mL/min/1.73m2     Lab Results   Component Value Date    TSH 1.05 03/08/2021         Assessment/Plan:  1. IDDM. She last saw endocrinology in May 2021 and started jardiance in addition to insulin toujeo, victoza and metformin. Continue to follow accuchecks. Last A1c good at 6.5%.  2. HTN. She is treated with metoprolol and chlorthalidone. Overall BPs acceptable, no need for changes.   3. Neuropathy. Chronic pain controlled with gabapentin, lyrica and Cymbalta.  4. Hx of stroke. Wheelchair bound, non ambulatory. On aspirin, statin.  5. Glaucoma. Visual impairment at baseline.  6. Depression. Continue Cymbalta.  7. CKD. Labs as noted above.  8. Anemia. Last hgb at 10.5, stable.        Electronically signed by: Meagan Valdez MD

## 2021-11-26 ENCOUNTER — LAB REQUISITION (OUTPATIENT)
Dept: LAB | Facility: CLINIC | Age: 71
End: 2021-11-26
Payer: COMMERCIAL

## 2021-11-26 DIAGNOSIS — U07.1 COVID-19: ICD-10-CM

## 2021-11-26 PROCEDURE — U0003 INFECTIOUS AGENT DETECTION BY NUCLEIC ACID (DNA OR RNA); SEVERE ACUTE RESPIRATORY SYNDROME CORONAVIRUS 2 (SARS-COV-2) (CORONAVIRUS DISEASE [COVID-19]), AMPLIFIED PROBE TECHNIQUE, MAKING USE OF HIGH THROUGHPUT TECHNOLOGIES AS DESCRIBED BY CMS-2020-01-R: HCPCS | Mod: ORL | Performed by: FAMILY MEDICINE

## 2021-11-28 LAB — SARS-COV-2 RNA RESP QL NAA+PROBE: NEGATIVE

## 2021-11-30 ENCOUNTER — LAB REQUISITION (OUTPATIENT)
Dept: LAB | Facility: CLINIC | Age: 71
End: 2021-11-30
Payer: COMMERCIAL

## 2021-11-30 ENCOUNTER — TELEPHONE (OUTPATIENT)
Dept: ENDOCRINOLOGY | Facility: CLINIC | Age: 71
End: 2021-11-30
Payer: COMMERCIAL

## 2021-11-30 DIAGNOSIS — U07.1 COVID-19: ICD-10-CM

## 2021-11-30 PROCEDURE — U0005 INFEC AGEN DETEC AMPLI PROBE: HCPCS | Mod: ORL | Performed by: FAMILY MEDICINE

## 2021-11-30 NOTE — TELEPHONE ENCOUNTER
Rosalva RN at [facility], sent over glucose records since making medication adjustments 11/15. Glucose appears to be well controlled on current regimen and she is having no hypoglycemia. Some high glucose readings before lunch, but overall, well controlled.  Recommend Cristal continue on current regimen for now.     KRISSY NoriegaC

## 2021-12-01 LAB — SARS-COV-2 RNA RESP QL NAA+PROBE: NEGATIVE

## 2021-12-03 PROCEDURE — U0003 INFECTIOUS AGENT DETECTION BY NUCLEIC ACID (DNA OR RNA); SEVERE ACUTE RESPIRATORY SYNDROME CORONAVIRUS 2 (SARS-COV-2) (CORONAVIRUS DISEASE [COVID-19]), AMPLIFIED PROBE TECHNIQUE, MAKING USE OF HIGH THROUGHPUT TECHNOLOGIES AS DESCRIBED BY CMS-2020-01-R: HCPCS | Mod: ORL | Performed by: FAMILY MEDICINE

## 2021-12-04 ENCOUNTER — LAB REQUISITION (OUTPATIENT)
Dept: LAB | Facility: CLINIC | Age: 71
End: 2021-12-04
Payer: COMMERCIAL

## 2021-12-04 DIAGNOSIS — Z11.59 ENCOUNTER FOR SCREENING FOR OTHER VIRAL DISEASES: ICD-10-CM

## 2021-12-05 LAB — SARS-COV-2 RNA RESP QL NAA+PROBE: NEGATIVE

## 2021-12-07 ENCOUNTER — LAB REQUISITION (OUTPATIENT)
Dept: LAB | Facility: CLINIC | Age: 71
End: 2021-12-07
Payer: COMMERCIAL

## 2021-12-07 DIAGNOSIS — Z11.59 ENCOUNTER FOR SCREENING FOR OTHER VIRAL DISEASES: ICD-10-CM

## 2021-12-07 PROCEDURE — U0005 INFEC AGEN DETEC AMPLI PROBE: HCPCS | Mod: ORL | Performed by: FAMILY MEDICINE

## 2021-12-08 LAB — SARS-COV-2 RNA RESP QL NAA+PROBE: NEGATIVE

## 2021-12-09 PROCEDURE — U0005 INFEC AGEN DETEC AMPLI PROBE: HCPCS | Mod: ORL | Performed by: FAMILY MEDICINE

## 2021-12-10 ENCOUNTER — LAB REQUISITION (OUTPATIENT)
Dept: LAB | Facility: CLINIC | Age: 71
End: 2021-12-10
Payer: COMMERCIAL

## 2021-12-10 DIAGNOSIS — Z11.59 ENCOUNTER FOR SCREENING FOR OTHER VIRAL DISEASES: ICD-10-CM

## 2021-12-11 LAB — SARS-COV-2 RNA RESP QL NAA+PROBE: NEGATIVE

## 2021-12-14 ENCOUNTER — LAB REQUISITION (OUTPATIENT)
Dept: LAB | Facility: CLINIC | Age: 71
End: 2021-12-14
Payer: COMMERCIAL

## 2021-12-14 DIAGNOSIS — Z11.59 ENCOUNTER FOR SCREENING FOR OTHER VIRAL DISEASES: ICD-10-CM

## 2021-12-14 PROCEDURE — U0003 INFECTIOUS AGENT DETECTION BY NUCLEIC ACID (DNA OR RNA); SEVERE ACUTE RESPIRATORY SYNDROME CORONAVIRUS 2 (SARS-COV-2) (CORONAVIRUS DISEASE [COVID-19]), AMPLIFIED PROBE TECHNIQUE, MAKING USE OF HIGH THROUGHPUT TECHNOLOGIES AS DESCRIBED BY CMS-2020-01-R: HCPCS | Mod: ORL | Performed by: FAMILY MEDICINE

## 2021-12-15 LAB — SARS-COV-2 RNA RESP QL NAA+PROBE: NEGATIVE

## 2021-12-16 ENCOUNTER — LAB REQUISITION (OUTPATIENT)
Dept: LAB | Facility: CLINIC | Age: 71
End: 2021-12-16
Payer: COMMERCIAL

## 2021-12-16 DIAGNOSIS — Z11.59 ENCOUNTER FOR SCREENING FOR OTHER VIRAL DISEASES: ICD-10-CM

## 2021-12-17 ENCOUNTER — LAB REQUISITION (OUTPATIENT)
Dept: LAB | Facility: CLINIC | Age: 71
End: 2021-12-17
Payer: COMMERCIAL

## 2021-12-17 DIAGNOSIS — Z11.59 ENCOUNTER FOR SCREENING FOR OTHER VIRAL DISEASES: ICD-10-CM

## 2021-12-17 PROCEDURE — U0005 INFEC AGEN DETEC AMPLI PROBE: HCPCS | Mod: ORL | Performed by: FAMILY MEDICINE

## 2021-12-18 LAB — SARS-COV-2 RNA RESP QL NAA+PROBE: NEGATIVE

## 2021-12-30 PROCEDURE — U0005 INFEC AGEN DETEC AMPLI PROBE: HCPCS | Mod: ORL | Performed by: FAMILY MEDICINE

## 2021-12-31 ENCOUNTER — LAB REQUISITION (OUTPATIENT)
Dept: LAB | Facility: CLINIC | Age: 71
End: 2021-12-31
Payer: COMMERCIAL

## 2021-12-31 DIAGNOSIS — Z11.59 ENCOUNTER FOR SCREENING FOR OTHER VIRAL DISEASES: ICD-10-CM

## 2022-01-01 ENCOUNTER — LAB REQUISITION (OUTPATIENT)
Dept: LAB | Facility: CLINIC | Age: 72
End: 2022-01-01
Payer: COMMERCIAL

## 2022-01-01 ENCOUNTER — NURSING HOME VISIT (OUTPATIENT)
Dept: GERIATRICS | Facility: CLINIC | Age: 72
End: 2022-01-01
Payer: COMMERCIAL

## 2022-01-01 ENCOUNTER — MEDICAL CORRESPONDENCE (OUTPATIENT)
Dept: HEALTH INFORMATION MANAGEMENT | Facility: CLINIC | Age: 72
End: 2022-01-01

## 2022-01-01 ENCOUNTER — PATIENT OUTREACH (OUTPATIENT)
Dept: GERIATRIC MEDICINE | Facility: CLINIC | Age: 72
End: 2022-01-01

## 2022-01-01 ENCOUNTER — TRANSFERRED RECORDS (OUTPATIENT)
Dept: HEALTH INFORMATION MANAGEMENT | Facility: CLINIC | Age: 72
End: 2022-01-01

## 2022-01-01 ENCOUNTER — TELEPHONE (OUTPATIENT)
Dept: ENDOCRINOLOGY | Facility: CLINIC | Age: 72
End: 2022-01-01

## 2022-01-01 ENCOUNTER — OFFICE VISIT (OUTPATIENT)
Dept: OPHTHALMOLOGY | Facility: CLINIC | Age: 72
End: 2022-01-01
Attending: OPHTHALMOLOGY
Payer: COMMERCIAL

## 2022-01-01 ENCOUNTER — MEDICAL CORRESPONDENCE (OUTPATIENT)
Dept: HEALTH INFORMATION MANAGEMENT | Facility: CLINIC | Age: 72
End: 2022-01-01
Payer: COMMERCIAL

## 2022-01-01 ENCOUNTER — HOSPITAL ENCOUNTER (OUTPATIENT)
Dept: RADIOLOGY | Facility: HOSPITAL | Age: 72
Discharge: HOME | End: 2022-10-07
Payer: COMMERCIAL

## 2022-01-01 ENCOUNTER — TRANSCRIBE ORDERS (OUTPATIENT)
Dept: OTHER | Age: 72
End: 2022-01-01

## 2022-01-01 ENCOUNTER — HOSPITAL ENCOUNTER (OUTPATIENT)
Dept: SPEECH THERAPY | Facility: HOSPITAL | Age: 72
Discharge: HOME | End: 2022-10-07
Payer: COMMERCIAL

## 2022-01-01 ENCOUNTER — VIRTUAL VISIT (OUTPATIENT)
Dept: ENDOCRINOLOGY | Facility: CLINIC | Age: 72
End: 2022-01-01
Payer: COMMERCIAL

## 2022-01-01 ENCOUNTER — TELEPHONE (OUTPATIENT)
Dept: OPHTHALMOLOGY | Facility: CLINIC | Age: 72
End: 2022-01-01

## 2022-01-01 ENCOUNTER — CLINICAL UPDATE (OUTPATIENT)
Dept: PHARMACY | Facility: CLINIC | Age: 72
End: 2022-01-01
Payer: COMMERCIAL

## 2022-01-01 ENCOUNTER — TELEPHONE (OUTPATIENT)
Dept: GERIATRICS | Facility: CLINIC | Age: 72
End: 2022-01-01

## 2022-01-01 ENCOUNTER — HOSPITAL ENCOUNTER (OUTPATIENT)
Facility: CLINIC | Age: 72
End: 2022-01-01
Attending: INTERNAL MEDICINE | Admitting: INTERNAL MEDICINE
Payer: COMMERCIAL

## 2022-01-01 ENCOUNTER — DISCHARGE SUMMARY NURSING HOME (OUTPATIENT)
Dept: GERIATRICS | Facility: CLINIC | Age: 72
End: 2022-01-01
Payer: COMMERCIAL

## 2022-01-01 VITALS
DIASTOLIC BLOOD PRESSURE: 65 MMHG | HEIGHT: 63 IN | SYSTOLIC BLOOD PRESSURE: 122 MMHG | BODY MASS INDEX: 27.29 KG/M2 | OXYGEN SATURATION: 98 % | TEMPERATURE: 98.2 F | WEIGHT: 154 LBS | HEART RATE: 74 BPM | RESPIRATION RATE: 18 BRPM

## 2022-01-01 VITALS
RESPIRATION RATE: 18 BRPM | WEIGHT: 160.4 LBS | SYSTOLIC BLOOD PRESSURE: 115 MMHG | TEMPERATURE: 98.3 F | BODY MASS INDEX: 28.41 KG/M2 | OXYGEN SATURATION: 98 % | HEART RATE: 74 BPM | DIASTOLIC BLOOD PRESSURE: 60 MMHG

## 2022-01-01 VITALS
OXYGEN SATURATION: 99 % | SYSTOLIC BLOOD PRESSURE: 158 MMHG | BODY MASS INDEX: 28 KG/M2 | HEART RATE: 69 BPM | HEIGHT: 63 IN | RESPIRATION RATE: 18 BRPM | TEMPERATURE: 97.2 F | DIASTOLIC BLOOD PRESSURE: 65 MMHG | WEIGHT: 158 LBS

## 2022-01-01 VITALS
HEART RATE: 78 BPM | DIASTOLIC BLOOD PRESSURE: 79 MMHG | WEIGHT: 158 LBS | RESPIRATION RATE: 18 BRPM | TEMPERATURE: 98.2 F | OXYGEN SATURATION: 99 % | BODY MASS INDEX: 28 KG/M2 | HEIGHT: 63 IN | SYSTOLIC BLOOD PRESSURE: 126 MMHG

## 2022-01-01 VITALS
OXYGEN SATURATION: 99 % | HEART RATE: 69 BPM | WEIGHT: 152.3 LBS | HEIGHT: 63 IN | BODY MASS INDEX: 26.98 KG/M2 | RESPIRATION RATE: 18 BRPM | SYSTOLIC BLOOD PRESSURE: 156 MMHG | DIASTOLIC BLOOD PRESSURE: 62 MMHG | TEMPERATURE: 97.2 F

## 2022-01-01 VITALS
SYSTOLIC BLOOD PRESSURE: 126 MMHG | WEIGHT: 164 LBS | TEMPERATURE: 98.3 F | OXYGEN SATURATION: 98 % | BODY MASS INDEX: 29.06 KG/M2 | HEIGHT: 63 IN | HEART RATE: 70 BPM | DIASTOLIC BLOOD PRESSURE: 68 MMHG | RESPIRATION RATE: 18 BRPM

## 2022-01-01 DIAGNOSIS — R51.9 NONINTRACTABLE HEADACHE, UNSPECIFIED CHRONICITY PATTERN, UNSPECIFIED HEADACHE TYPE: ICD-10-CM

## 2022-01-01 DIAGNOSIS — H40.1133 PRIMARY OPEN ANGLE GLAUCOMA (POAG) OF BOTH EYES, SEVERE STAGE: Primary | ICD-10-CM

## 2022-01-01 DIAGNOSIS — Z79.4 TYPE 2 DIABETES MELLITUS WITH DIABETIC NEPHROPATHY, WITH LONG-TERM CURRENT USE OF INSULIN (H): Primary | ICD-10-CM

## 2022-01-01 DIAGNOSIS — R13.10 DYSPHAGIA, UNSPECIFIED TYPE: Primary | ICD-10-CM

## 2022-01-01 DIAGNOSIS — Z51.81 ENCOUNTER FOR THERAPEUTIC DRUG LEVEL MONITORING: ICD-10-CM

## 2022-01-01 DIAGNOSIS — R13.10 DIFFICULTY SWALLOWING: Primary | ICD-10-CM

## 2022-01-01 DIAGNOSIS — Z79.4 TYPE 2 DIABETES MELLITUS WITH DIABETIC POLYNEUROPATHY, WITH LONG-TERM CURRENT USE OF INSULIN (H): ICD-10-CM

## 2022-01-01 DIAGNOSIS — E11.21 TYPE 2 DIABETES MELLITUS WITH DIABETIC NEPHROPATHY, WITH LONG-TERM CURRENT USE OF INSULIN (H): Primary | ICD-10-CM

## 2022-01-01 DIAGNOSIS — G89.4 CHRONIC PAIN SYNDROME: ICD-10-CM

## 2022-01-01 DIAGNOSIS — R52 PAIN, UNSPECIFIED: ICD-10-CM

## 2022-01-01 DIAGNOSIS — Z86.69 HISTORY OF ITCHING OF EYE: ICD-10-CM

## 2022-01-01 DIAGNOSIS — E11.21 TYPE 2 DIABETES MELLITUS WITH DIABETIC NEPHROPATHY, WITH LONG-TERM CURRENT USE OF INSULIN (H): ICD-10-CM

## 2022-01-01 DIAGNOSIS — R13.10 DYSPHAGIA: ICD-10-CM

## 2022-01-01 DIAGNOSIS — E11.42 DIABETIC POLYNEUROPATHY ASSOCIATED WITH TYPE 2 DIABETES MELLITUS (H): ICD-10-CM

## 2022-01-01 DIAGNOSIS — E78.5 HYPERLIPIDEMIA, UNSPECIFIED HYPERLIPIDEMIA TYPE: ICD-10-CM

## 2022-01-01 DIAGNOSIS — N18.30 STAGE 3 CHRONIC KIDNEY DISEASE, UNSPECIFIED WHETHER STAGE 3A OR 3B CKD (H): ICD-10-CM

## 2022-01-01 DIAGNOSIS — E11.69 TYPE 2 DIABETES MELLITUS WITH OTHER SPECIFIED COMPLICATION, WITH LONG-TERM CURRENT USE OF INSULIN (H): ICD-10-CM

## 2022-01-01 DIAGNOSIS — I63.9 CEREBROVASCULAR ACCIDENT (CVA), UNSPECIFIED MECHANISM (H): ICD-10-CM

## 2022-01-01 DIAGNOSIS — F32.A DEPRESSION, UNSPECIFIED DEPRESSION TYPE: ICD-10-CM

## 2022-01-01 DIAGNOSIS — H40.9 GLAUCOMA, UNSPECIFIED GLAUCOMA TYPE, UNSPECIFIED LATERALITY: ICD-10-CM

## 2022-01-01 DIAGNOSIS — E11.51 DIABETES MELLITUS WITH PERIPHERAL VASCULAR DISEASE (H): ICD-10-CM

## 2022-01-01 DIAGNOSIS — R53.81 PHYSICAL DECONDITIONING: ICD-10-CM

## 2022-01-01 DIAGNOSIS — Z96.1 PSEUDOPHAKIA OF BOTH EYES: ICD-10-CM

## 2022-01-01 DIAGNOSIS — G62.9 NEUROPATHY: ICD-10-CM

## 2022-01-01 DIAGNOSIS — I10 ESSENTIAL HYPERTENSION: ICD-10-CM

## 2022-01-01 DIAGNOSIS — K21.9 GASTROESOPHAGEAL REFLUX DISEASE WITHOUT ESOPHAGITIS: ICD-10-CM

## 2022-01-01 DIAGNOSIS — H04.123 DRY EYES, BILATERAL: ICD-10-CM

## 2022-01-01 DIAGNOSIS — Z79.4 TYPE 2 DIABETES MELLITUS WITH OTHER SPECIFIED COMPLICATION, WITH LONG-TERM CURRENT USE OF INSULIN (H): Primary | ICD-10-CM

## 2022-01-01 DIAGNOSIS — Z79.4 TYPE 2 DIABETES MELLITUS WITH DIABETIC NEPHROPATHY, WITH LONG-TERM CURRENT USE OF INSULIN (H): ICD-10-CM

## 2022-01-01 DIAGNOSIS — D64.9 ANEMIA, UNSPECIFIED TYPE: ICD-10-CM

## 2022-01-01 DIAGNOSIS — R53.81 DECLINING FUNCTIONAL STATUS: Primary | ICD-10-CM

## 2022-01-01 DIAGNOSIS — E66.01 MORBID OBESITY (H): ICD-10-CM

## 2022-01-01 DIAGNOSIS — E11.9 DIABETES MELLITUS, TYPE 2 (H): ICD-10-CM

## 2022-01-01 DIAGNOSIS — R63.4 WEIGHT LOSS: ICD-10-CM

## 2022-01-01 DIAGNOSIS — R13.10 DYSPHAGIA, UNSPECIFIED TYPE: ICD-10-CM

## 2022-01-01 DIAGNOSIS — I25.10 CORONARY ARTERY DISEASE INVOLVING NATIVE HEART WITHOUT ANGINA PECTORIS, UNSPECIFIED VESSEL OR LESION TYPE: ICD-10-CM

## 2022-01-01 DIAGNOSIS — E11.42 DIABETIC PERIPHERAL NEUROPATHY ASSOCIATED WITH TYPE 2 DIABETES MELLITUS (H): ICD-10-CM

## 2022-01-01 DIAGNOSIS — E11.42 TYPE 2 DIABETES MELLITUS WITH DIABETIC POLYNEUROPATHY, WITH LONG-TERM CURRENT USE OF INSULIN (H): ICD-10-CM

## 2022-01-01 DIAGNOSIS — H40.1130 PRIMARY OPEN ANGLE GLAUCOMA (POAG) OF BOTH EYES: ICD-10-CM

## 2022-01-01 DIAGNOSIS — Z86.73 HISTORY OF STROKE: ICD-10-CM

## 2022-01-01 DIAGNOSIS — I10 BENIGN ESSENTIAL HYPERTENSION: ICD-10-CM

## 2022-01-01 DIAGNOSIS — G63 POLYNEUROPATHY ASSOCIATED WITH UNDERLYING DISEASE (H): ICD-10-CM

## 2022-01-01 DIAGNOSIS — K21.9 GASTROESOPHAGEAL REFLUX DISEASE WITHOUT ESOPHAGITIS: Primary | ICD-10-CM

## 2022-01-01 DIAGNOSIS — E11.69 TYPE 2 DIABETES MELLITUS WITH OTHER SPECIFIED COMPLICATION, WITH LONG-TERM CURRENT USE OF INSULIN (H): Primary | ICD-10-CM

## 2022-01-01 DIAGNOSIS — Z79.4 TYPE 2 DIABETES MELLITUS WITH OTHER SPECIFIED COMPLICATION, WITH LONG-TERM CURRENT USE OF INSULIN (H): ICD-10-CM

## 2022-01-01 DIAGNOSIS — I12.9 BENIGN HYPERTENSIVE KIDNEY DISEASE WITH CHRONIC KIDNEY DISEASE STAGE I THROUGH STAGE IV, OR UNSPECIFIED: ICD-10-CM

## 2022-01-01 DIAGNOSIS — R63.4 ABNORMAL WEIGHT LOSS: ICD-10-CM

## 2022-01-01 DIAGNOSIS — E11.40 TYPE 2 DIABETES MELLITUS WITH DIABETIC NEUROPATHY, UNSPECIFIED (H): ICD-10-CM

## 2022-01-01 DIAGNOSIS — K64.4 EXTERNAL HEMORRHOIDS: Primary | ICD-10-CM

## 2022-01-01 DIAGNOSIS — K59.01 SLOW TRANSIT CONSTIPATION: ICD-10-CM

## 2022-01-01 DIAGNOSIS — R13.10 DIFFICULTY SWALLOWING: ICD-10-CM

## 2022-01-01 LAB
ANION GAP SERPL CALCULATED.3IONS-SCNC: 12 MMOL/L (ref 7–15)
BUN SERPL-MCNC: 39.2 MG/DL (ref 8–23)
CALCIUM SERPL-MCNC: 10 MG/DL (ref 8.8–10.2)
CHLORIDE SERPL-SCNC: 101 MMOL/L (ref 98–107)
CREAT SERPL-MCNC: 0.96 MG/DL (ref 0.51–0.95)
CRP SERPL-MCNC: <3 MG/L
DEPRECATED HCO3 PLAS-SCNC: 24 MMOL/L (ref 22–29)
ERYTHROCYTE [DISTWIDTH] IN BLOOD BY AUTOMATED COUNT: 12.9 % (ref 10–15)
ERYTHROCYTE [SEDIMENTATION RATE] IN BLOOD BY WESTERGREN METHOD: 69 MM/HR (ref 0–30)
GFR SERPL CREATININE-BSD FRML MDRD: 63 ML/MIN/1.73M2
GLUCOSE SERPL-MCNC: 93 MG/DL (ref 70–99)
HBA1C MFR BLD: 6.9 %
HCT VFR BLD AUTO: 33.7 % (ref 35–47)
HGB BLD-MCNC: 10.7 G/DL (ref 11.7–15.7)
MCH RBC QN AUTO: 30.7 PG (ref 26.5–33)
MCHC RBC AUTO-ENTMCNC: 31.8 G/DL (ref 31.5–36.5)
MCV RBC AUTO: 97 FL (ref 78–100)
PLATELET # BLD AUTO: 196 10E3/UL (ref 150–450)
POTASSIUM SERPL-SCNC: 3.6 MMOL/L (ref 3.4–5.3)
RBC # BLD AUTO: 3.49 10E6/UL (ref 3.8–5.2)
SARS-COV-2 RNA RESP QL NAA+PROBE: NEGATIVE
SODIUM SERPL-SCNC: 137 MMOL/L (ref 136–145)
WBC # BLD AUTO: 6.4 10E3/UL (ref 4–11)

## 2022-01-01 PROCEDURE — P9604 ONE-WAY ALLOW PRORATED TRIP: HCPCS | Mod: ORL | Performed by: OPHTHALMOLOGY

## 2022-01-01 PROCEDURE — 92611 MOTION FLUOROSCOPY/SWALLOW: CPT | Mod: GN

## 2022-01-01 PROCEDURE — 99316 NF DSCHRG MGMT 30 MIN+: CPT | Performed by: NURSE PRACTITIONER

## 2022-01-01 PROCEDURE — 74230 X-RAY XM SWLNG FUNCJ C+: CPT

## 2022-01-01 PROCEDURE — 99310 SBSQ NF CARE HIGH MDM 45: CPT | Performed by: NURSE PRACTITIONER

## 2022-01-01 PROCEDURE — G0463 HOSPITAL OUTPT CLINIC VISIT: HCPCS

## 2022-01-01 PROCEDURE — 36415 COLL VENOUS BLD VENIPUNCTURE: CPT | Mod: ORL | Performed by: OPHTHALMOLOGY

## 2022-01-01 PROCEDURE — 99309 SBSQ NF CARE MODERATE MDM 30: CPT | Performed by: INTERNAL MEDICINE

## 2022-01-01 PROCEDURE — 85652 RBC SED RATE AUTOMATED: CPT | Mod: ORL | Performed by: OPHTHALMOLOGY

## 2022-01-01 PROCEDURE — 99309 SBSQ NF CARE MODERATE MDM 30: CPT | Performed by: FAMILY MEDICINE

## 2022-01-01 PROCEDURE — 80048 BASIC METABOLIC PNL TOTAL CA: CPT | Mod: ORL | Performed by: FAMILY MEDICINE

## 2022-01-01 PROCEDURE — 99309 SBSQ NF CARE MODERATE MDM 30: CPT | Performed by: NURSE PRACTITIONER

## 2022-01-01 PROCEDURE — 99207 PR NO CHARGE LOS: CPT | Performed by: PHARMACIST

## 2022-01-01 PROCEDURE — 99213 OFFICE O/P EST LOW 20 MIN: CPT | Mod: GC | Performed by: OPHTHALMOLOGY

## 2022-01-01 PROCEDURE — P9604 ONE-WAY ALLOW PRORATED TRIP: HCPCS | Mod: ORL | Performed by: PHYSICIAN ASSISTANT

## 2022-01-01 PROCEDURE — 36415 COLL VENOUS BLD VENIPUNCTURE: CPT | Mod: ORL | Performed by: PHYSICIAN ASSISTANT

## 2022-01-01 PROCEDURE — 83036 HEMOGLOBIN GLYCOSYLATED A1C: CPT | Mod: ORL | Performed by: PHYSICIAN ASSISTANT

## 2022-01-01 PROCEDURE — 36415 COLL VENOUS BLD VENIPUNCTURE: CPT | Mod: ORL | Performed by: FAMILY MEDICINE

## 2022-01-01 PROCEDURE — 85027 COMPLETE CBC AUTOMATED: CPT | Mod: ORL | Performed by: FAMILY MEDICINE

## 2022-01-01 PROCEDURE — 99213 OFFICE O/P EST LOW 20 MIN: CPT | Mod: 95 | Performed by: PHYSICIAN ASSISTANT

## 2022-01-01 PROCEDURE — P9604 ONE-WAY ALLOW PRORATED TRIP: HCPCS | Mod: ORL | Performed by: FAMILY MEDICINE

## 2022-01-01 PROCEDURE — 86140 C-REACTIVE PROTEIN: CPT | Mod: ORL | Performed by: OPHTHALMOLOGY

## 2022-01-01 RX ORDER — LATANOPROST 50 UG/ML
1 SOLUTION/ DROPS OPHTHALMIC AT BEDTIME
Qty: 5 ML | Refills: 2 | Status: SHIPPED | OUTPATIENT
Start: 2022-01-01

## 2022-01-01 RX ORDER — PREGABALIN 75 MG/1
75 CAPSULE ORAL 3 TIMES DAILY
Qty: 90 CAPSULE | Refills: 3 | OUTPATIENT
Start: 2022-01-01

## 2022-01-01 RX ORDER — HYDROCORTISONE 25 MG/G
CREAM TOPICAL 2 TIMES DAILY PRN
Qty: 60 G | Refills: 0 | Status: SHIPPED | OUTPATIENT
Start: 2022-01-01 | End: 2023-01-01

## 2022-01-01 RX ORDER — DORZOLAMIDE HYDROCHLORIDE AND TIMOLOL MALEATE 20; 5 MG/ML; MG/ML
1 SOLUTION/ DROPS OPHTHALMIC 2 TIMES DAILY
Qty: 10 ML | Refills: 3 | Status: SHIPPED | OUTPATIENT
Start: 2022-01-01

## 2022-01-01 RX ORDER — MINERAL OIL, PETROLATUM 425; 573 MG/G; MG/G
OINTMENT OPHTHALMIC
Status: ON HOLD | COMMUNITY
End: 2023-01-01

## 2022-01-01 RX ORDER — LIRAGLUTIDE 6 MG/ML
INJECTION SUBCUTANEOUS
Qty: 9 ML | Refills: 1 | Status: ON HOLD | OUTPATIENT
Start: 2022-01-01 | End: 2023-01-01

## 2022-01-01 RX ORDER — OLOPATADINE HYDROCHLORIDE 2 MG/ML
1 SOLUTION/ DROPS OPHTHALMIC DAILY
Qty: 2.5 ML | Refills: 3 | Status: ON HOLD | OUTPATIENT
Start: 2022-01-01 | End: 2023-01-01

## 2022-01-01 RX ORDER — BARIUM SULFATE 400 MG/ML
SUSPENSION ORAL ONCE
Status: COMPLETED | OUTPATIENT
Start: 2022-01-01 | End: 2022-01-01

## 2022-01-01 RX ORDER — PREGABALIN 75 MG/1
75 CAPSULE ORAL 3 TIMES DAILY
Qty: 90 CAPSULE | Refills: 3 | Status: SHIPPED | OUTPATIENT
Start: 2022-01-01 | End: 2023-01-01

## 2022-01-01 RX ORDER — POLYVINYL ALCOHOL 14 MG/ML
1 SOLUTION/ DROPS OPHTHALMIC 3 TIMES DAILY
COMMUNITY

## 2022-01-01 RX ADMIN — BARIUM SULFATE 60 ML: 400 SUSPENSION ORAL at 09:49

## 2022-01-01 RX ADMIN — BARIUM SULFATE 20 ML: 400 SUSPENSION ORAL at 09:47

## 2022-01-01 ASSESSMENT — CONF VISUAL FIELD
OS_INFERIOR_NASAL_RESTRICTION: 3
METHOD: COUNTING FINGERS
OS_INFERIOR_TEMPORAL_RESTRICTION: 3
OD_NORMAL: 1
OS_SUPERIOR_TEMPORAL_RESTRICTION: 3
OS_SUPERIOR_NASAL_RESTRICTION: 3

## 2022-01-01 ASSESSMENT — REFRACTION_WEARINGRX
OS_CYLINDER: +0.75
OS_AXIS: 025
OD_ADD: +2.50
OD_SPHERE: -1.00
OS_ADD: +2.50
OD_AXIS: 170
OS_SPHERE: -0.75
OD_CYLINDER: +0.75

## 2022-01-01 ASSESSMENT — TONOMETRY
OD_IOP_MMHG: 13
IOP_METHOD: TONOPEN
OS_IOP_MMHG: 11
OS_IOP_MMHG: 16
OD_IOP_MMHG: 14

## 2022-01-01 ASSESSMENT — VISUAL ACUITY
CORRECTION_TYPE: GLASSES
METHOD: SNELLEN - LINEAR
OD_CC: 20/25
OS_CC: 20/150
OS_PH_CC: 20/125

## 2022-01-01 ASSESSMENT — ACTIVITIES OF DAILY LIVING (ADL)
DEPENDENT_IADLS:: CLEANING;COOKING;LAUNDRY;SHOPPING;MEAL PREPARATION;MEDICATION MANAGEMENT;MONEY MANAGEMENT;TRANSPORTATION;INCONTINENCE

## 2022-01-03 ENCOUNTER — LAB REQUISITION (OUTPATIENT)
Dept: LAB | Facility: CLINIC | Age: 72
End: 2022-01-03
Payer: COMMERCIAL

## 2022-01-03 DIAGNOSIS — Z11.59 ENCOUNTER FOR SCREENING FOR OTHER VIRAL DISEASES: ICD-10-CM

## 2022-01-04 PROCEDURE — U0003 INFECTIOUS AGENT DETECTION BY NUCLEIC ACID (DNA OR RNA); SEVERE ACUTE RESPIRATORY SYNDROME CORONAVIRUS 2 (SARS-COV-2) (CORONAVIRUS DISEASE [COVID-19]), AMPLIFIED PROBE TECHNIQUE, MAKING USE OF HIGH THROUGHPUT TECHNOLOGIES AS DESCRIBED BY CMS-2020-01-R: HCPCS | Mod: ORL | Performed by: FAMILY MEDICINE

## 2022-01-05 ENCOUNTER — NURSING HOME VISIT (OUTPATIENT)
Dept: GERIATRICS | Facility: CLINIC | Age: 72
End: 2022-01-05
Payer: COMMERCIAL

## 2022-01-05 ENCOUNTER — OFFICE VISIT (OUTPATIENT)
Dept: OPHTHALMOLOGY | Facility: CLINIC | Age: 72
End: 2022-01-05
Attending: OPHTHALMOLOGY
Payer: COMMERCIAL

## 2022-01-05 VITALS
TEMPERATURE: 98 F | WEIGHT: 156 LBS | SYSTOLIC BLOOD PRESSURE: 150 MMHG | OXYGEN SATURATION: 94 % | HEART RATE: 84 BPM | DIASTOLIC BLOOD PRESSURE: 84 MMHG | HEIGHT: 63 IN | BODY MASS INDEX: 27.64 KG/M2 | RESPIRATION RATE: 20 BRPM

## 2022-01-05 DIAGNOSIS — R53.81 PHYSICAL DECONDITIONING: ICD-10-CM

## 2022-01-05 DIAGNOSIS — G62.9 NEUROPATHY: ICD-10-CM

## 2022-01-05 DIAGNOSIS — Z96.1 PSEUDOPHAKIA OF BOTH EYES: ICD-10-CM

## 2022-01-05 DIAGNOSIS — H40.1133 PRIMARY OPEN ANGLE GLAUCOMA (POAG) OF BOTH EYES, SEVERE STAGE: Primary | ICD-10-CM

## 2022-01-05 DIAGNOSIS — E11.69 TYPE 2 DIABETES MELLITUS WITH OTHER SPECIFIED COMPLICATION, WITH LONG-TERM CURRENT USE OF INSULIN (H): Primary | ICD-10-CM

## 2022-01-05 DIAGNOSIS — Z79.4 TYPE 2 DIABETES MELLITUS WITH OTHER SPECIFIED COMPLICATION, WITH LONG-TERM CURRENT USE OF INSULIN (H): Primary | ICD-10-CM

## 2022-01-05 DIAGNOSIS — H04.123 DRY EYES, BILATERAL: ICD-10-CM

## 2022-01-05 LAB — SARS-COV-2 RNA RESP QL NAA+PROBE: NORMAL

## 2022-01-05 PROCEDURE — 99214 OFFICE O/P EST MOD 30 MIN: CPT | Mod: GC | Performed by: OPHTHALMOLOGY

## 2022-01-05 PROCEDURE — G0463 HOSPITAL OUTPT CLINIC VISIT: HCPCS

## 2022-01-05 PROCEDURE — 99310 SBSQ NF CARE HIGH MDM 45: CPT | Performed by: NURSE PRACTITIONER

## 2022-01-05 RX ORDER — ACETAMINOPHEN 500 MG
1000 TABLET ORAL 3 TIMES DAILY
Status: ON HOLD | COMMUNITY
End: 2023-01-01

## 2022-01-05 RX ORDER — LATANOPROST 50 UG/ML
1 SOLUTION/ DROPS OPHTHALMIC DAILY
COMMUNITY
End: 2022-01-01

## 2022-01-05 RX ORDER — TETRAHYDROZOLINE HCL 0.05 %
2 DROPS OPHTHALMIC (EYE) 4 TIMES DAILY
COMMUNITY
End: 2022-01-01

## 2022-01-05 ASSESSMENT — REFRACTION_WEARINGRX
OS_CYLINDER: +0.75
OS_AXIS: 025
OD_CYLINDER: +0.75
OD_SPHERE: -1.00
OS_SPHERE: -0.75
OS_ADD: +2.50
OD_AXIS: 170
OD_ADD: +2.50

## 2022-01-05 ASSESSMENT — TONOMETRY
OD_IOP_MMHG: 11
IOP_METHOD: APPLANATION
OS_IOP_MMHG: 10

## 2022-01-05 ASSESSMENT — CUP TO DISC RATIO
OS_RATIO: 0.85
OD_RATIO: 0.9

## 2022-01-05 ASSESSMENT — MIFFLIN-ST. JEOR: SCORE: 1201.74

## 2022-01-05 ASSESSMENT — VISUAL ACUITY
OD_CC+: -1
METHOD: SNELLEN - LINEAR
CORRECTION_TYPE: GLASSES
OS_CC: 20/100
OD_CC: 20/25

## 2022-01-05 ASSESSMENT — CONF VISUAL FIELD
OS_NORMAL: 1
OD_NORMAL: 1

## 2022-01-05 NOTE — PROGRESS NOTES
[facility] GERIATRIC SERVICES  Chief Complaint   Patient presents with     Annual Comprehensive Nursing Home     [facility] Medical Record Number:  [id number]  Place of Service where encounter took place:  [facility] () [[id number]]    HPI:    Cristal Preciado  is a 71 year old  (1950), who is being seen today for an annual comprehensive visit. She resides   in the long-term care facility [facility]. She has been a resident here since October 2011. She does have multiple complex co- morbidities. She is treated for hypertension and has insulin-dependent diabetes mellitus. She has a endocrinologist who follows her  blood sugars regularly.  She has chronic neuropathy, chronic kidney disease and is treated with Cymbalta for depression. She is on gabapentin and lyrica for neuropathy pain. She is on meds for depression.  Does require a lift for transfers. She has chronic weakness in her lower extremities.      Today she is seen for an Annual Visit.  She denies chest pain or shortness of breath.  She denies cough or congestion.  She is  non ambulatory, She is on  gabapentin and lyrica for neuropathy.. Her BS are being controlled by her endocrinologist and being sent in every couple of weeks.  She denies cough or congestion.  She does have numbness and pain in her hands.  No open areas on her skin. Her weights were reviewed and she has been up and down 5 pounds over the last month. Staff to follow up to see if she is due for a mammogram or a colonoscopy. She is with chronic right shoulder pain and Voltaren gel added QID PRN          ALLERGIES: Dust mites, Food allergy formula, and Pollen extract  PAST MEDICAL HISTORY:   Past Medical History:   Diagnosis Date     AION (anterior ischaemic optic neuropathy), left eye     NAION LE     CAD (coronary artery disease) 3/2009    [facility]; Angio 2013 [facility]- normal coronary arteries     Cataract      CVA (cerebral vascular accident) (H)      admitted at [facility]     on Cymbalta     Diabetes mellitus, type 2 (H)      Diabetic nephropathy (H)      Diabetic neuropathy (H)     severe     Diabetic retinopathy (H)      GERD (gastroesophageal reflux disease)      Hyperlipidemia      Hypertension     ECHO 2013, TDS, NL EF     POAG (primary open-angle glaucoma)     adv BE     Seasonal allergies      Tubular adenoma of colon     repeat colonoscopy in 2018      PAST SURGICAL HISTORY:  has a past surgical history that includes Extracapsular cataract extration with intraocular lens implant (11-10-09, 2-9-10); Colonoscopy (7/15/2013);  section; stent, coronary, lainey (); DaVINCI hysterectomy total, bilateral salpingo-oophorectomy, combined (N/A, 2019); Colonoscopy (N/A, 2020); Coronary Stent Placement (2004); Cardiac catheterization; Hysterectomy total abdominal, bilateral salpingo-oophorectomy, combined (2019); Cataract Extraction W/ Intraocular Lens Implant (Bilateral); and  Section.  IMMUNIZATIONS:  Immunization History   Administered Date(s) Administered     COVID-19,PF,Moderna 2020, 2021, 2021     FLU 6-35 months 2009, 2010     Flu, Unspecified 10/26/2016, 10/12/2017, 10/15/2018, 10/11/2019, 10/22/2020     Influenza (High Dose) 3 valent vaccine 10/15/2018, 10/11/2019, 10/22/2020, 10/18/2021     Influenza (IIV3) PF 2003, 11/10/2005, 10/19/2006, 2009, 10/24/2011, 2012, 10/21/2015     Influenza Vaccine IM > 6 months Valent IIV4 (Alfuria,Fluzone) 2014     Influenza Vaccine IM Ages 6-35 Months 4 Valent (PF) 10/18/2021     Pneumococcal (PCV 7) 2003     Pneumococcal 23 valent 10/24/2011     TD (ADULT, 7+) 2003     TDAP Vaccine (Boostrix) 2014     Tdap (Adacel,Boostrix) 2014     Zoster vaccine, live 2013     Above immunizations pulled from [facility].Future immunizations are not needed at this point as all recommended  immunizations are up to date.       Current Outpatient Medications:      acetaminophen (TYLENOL) 500 MG tablet, Take 1,000 mg by mouth 3 times daily, Disp: , Rfl:      ASPIRIN LOW DOSE 81 MG chewable tablet, CHEW AND SWALLOW 1 TAB BY MOUTH ONCE DAILY IN THE MORNING, Disp: , Rfl: 99     atorvastatin (LIPITOR) 80 MG tablet, Take 1 tablet by mouth daily. Pt needs to have labs done prior to further refills., Disp: 90 tablet, Rfl: 0     benzocaine-menthol (CEPACOL) 15-3.6 MG lozenge, Place 1 lozenge inside cheek every 2 hours as needed for moderate pain, Disp: , Rfl:      bisacodyl (DULCOLAX) 10 MG suppository, Place 10 mg rectally daily as needed for constipation, Disp: , Rfl:      carboxymethylcellulose (REFRESH PLUS) 0.5 % SOLN, 1 drop 3 times daily as needed., Disp: , Rfl:      CHLORTHALIDONE PO, Take 25 mg by mouth every morning , Disp: , Rfl:      dorzolamide-timolol 2-0.5 % OP ophthalmic solution, Place 1 drop into both eyes 2 times daily, Disp: 1 Bottle, Rfl: 11     DULoxetine HCl (CYMBALTA PO), Take 90 mg by mouth every morning , Disp: , Rfl:      folic acid (FOLVITE) 1 MG tablet, Take 1 mg by mouth every morning , Disp: , Rfl:      gabapentin (NEURONTIN) 300 MG capsule, Take 300 mg by mouth 3 times daily, Disp: , Rfl:      insulin glargine U-300 (TOUJEO) 300 UNIT/ML (1 units dial) pen, Inject 55 Units Subcutaneous At Bedtime, Disp: , Rfl:      ketoconazole (NIZORAL) 2 % shampoo, To entire wet scalp and ears and then wash off after 5 minutes three times a week., Disp: 240 mL, Rfl: 11     latanoprost (XALATAN) 0.005 % ophthalmic solution, Place 1 drop into both eyes daily, Disp: , Rfl:      liraglutide (VICTOZA PEN) 18 MG/3ML solution, Inject 1.2 mg Subcutaneous daily, Disp: , Rfl:      loperamide (IMODIUM A-D) 2 MG tablet, Take 2 mg by mouth 4 times daily as needed for diarrhea, Disp: , Rfl:      loratadine (CLARITIN) 10 MG tablet, Take 10 mg by mouth as needed., Disp: , Rfl:      metFORMIN (GLUCOPHAGE-XR) 500  MG 24 hr tablet, Take 2 tablets (1,000 mg) by mouth daily (with dinner), Disp: 180 tablet, Rfl: 3     metoprolol tartrate (LOPRESSOR) 100 MG tablet, Take 100 mg by mouth 2 times daily, Disp: , Rfl:      nitroGLYCERIN (NITROSTAT) 0.4 MG SL tablet, Place 0.4 mg under the tongue every 5 minutes as needed., Disp: , Rfl:      olopatadine HCl (PATADAY) 0.2 % SOLN, Place 1 drop into both eyes daily as needed For itchy eyes, Disp: 1 Bottle, Rfl: 5     omeprazole (PRILOSEC) 20 MG DR capsule, Take 20 mg by mouth, Disp: , Rfl:      pregabalin (LYRICA) 75 MG capsule, Take 1 capsule (75 mg) by mouth 3 times daily, Disp: 90 capsule, Rfl: 3     senna-docusate (SENOKOT-S/PERICOLACE) 8.6-50 MG tablet, Take 2 tablets by mouth 2 times daily as needed for constipation, Disp: 60 tablet, Rfl: 0     simethicone (MYLICON) 80 MG chewable tablet, Take 80 mg by mouth 4 times daily, Disp: , Rfl:      tetrahydrozoline (VISINE) 0.05 % ophthalmic solution, Place 2 drops into both eyes 4 times daily, Disp: , Rfl:      Case Management:  I have reviewed the facility/SNF care plan/MDS, including the falls risk, nutrition and pain screening. I also reviewed the current immunizations, and preventive care.. Future cancer screening is not clinically indicated secondary to age/goals of care Patient's desire to return to the community is not present. Current Level of Care is appropriate.      MDS reviewed 10/22/21 and due again 1/19/22    Advance Directive Discussion:    I reviewed the current advanced directives as reflected in EPIC, the POLST and the facility chart, and verified the congruency of orders  I did not due to cognitive impairment review the advance directives with the resident. Patient's goal is pain control and comfort.    Team Discussion:  I communicated with the appropriate disciplines involved with the Plan of Care:   Nursing    Information reviewed:  Medications, vital signs, orders, and nursing notes.    ROS:  Constitutional: Positive  "for activity change. Negative for appetite change, chills, fatigue and fever.        Lift for transfers.    HENT: Negative for congestion and sore throat.    Respiratory: Negative for shortness of breath and wheezing.    Cardiovascular: negative for leg swelling . Negative for chest pain. (intermittent Right  upper chest discomfort. She was seen by cardiology and no known cause found.    compression stockings   Gastrointestinal: Negative for abdominal distention, abdominal pain, constipation, diarrhea and nausea.   Genitourinary: Negative for dysuria.   Musculoskeletal: Positive for arthralgias. Negative for myalgias.   Skin: Negative for color change, rash and wound.   Neurological: Positive for numbness. Negative for dizziness and weakness.         neuropathy bilateral hands   Psychiatric/Behavioral: Negative for agitation, behavioral problems and sleep disturbance.     Vitals:  BP (!) 150/84   Pulse 84   Temp 98  F (36.7  C)   Resp 20   Ht 1.6 m (5' 3\")   Wt 70.8 kg (156 lb)   SpO2 94%   BMI 27.63 kg/m   Body mass index is 27.63 kg/m .  Exam:   Abdominal: She exhibits distension.  Abdomen is soft  Skin:    healed old lap sites on abdomen     Constitutional: She is oriented to person, place, and time. She appears well-developed and well-nourished.   Pleasant woman in no acute distress.   HENT:   Head: Normocephalic.   Eyes: Conjunctivae are normal.   Neck: Normal range of motion.   Cardiovascular: Normal rate, regular rhythm and normal heart sounds.   No murmur heard.  Pulmonary/Chest: Breath sounds normal. No respiratory distress. She has no wheezes. She has no rales.   Abdominal: Soft. Bowel sounds are normal. She exhibits no distension. There is no tenderness.   Musculoskeletal: She exhibits edema.   Wearing Compression socks   Neurological: She is alert and oriented to person, place, and time.   Neuropathy bilateral hands.   Skin: Skin is warm.   Psychiatric: She has a normal mood and affect. " Her behavior is normal    Lab/Diagnostic data:   Recent Results (from the past 240 hour(s))   COVID-19 Virus (Coronavirus) by PCR Nose    Collection Time: 12/30/21  1:37 PM    Specimen: Nose; Swab   Result Value Ref Range    SARS CoV2 PCR Negative Negative   COVID-19 Virus (Coronavirus) by PCR Nasopharyngeal    Collection Time: 01/04/22  6:00 AM    Specimen: Nasopharyngeal; Swab   Result Value Ref Range    SARS CoV2 PCR  Negative     Testing sent to reference lab. Results will be returned via unsolicited result       ASSESSMENT/PLAN    Chronic right shoulder pain- Voltaren gel topically QID PRN, Tylenol TID    IDDM. FS are stable last A1C 8.1 on 6/17/2021. Endocrinologist following , Continue current diabetic medications as above.       HTN. Continue  Lisinpril and metoprolol-stable     Neuropathy   On lyrica and gabapentin.     CAD. HX  MI and stent.      S/p Hysterectomy in October 2019     Right knee pain continue pain medications notify provider for any increased changes     Electronically signed by:  Shawna Mesa, CNP

## 2022-01-05 NOTE — PROGRESS NOTES
Chief Complaint/Presenting Concern: Here for glaucoma follow up     History of Present Illness:   Cristal Preciado is a 70 year old patient who presents for evaluation of glaucoma. Patient complains of itching and excessive tearing in both eyes. No recent change in her vision and no eye pain.     Last week, Ms. Preciado says she had black out of her vision in her right eye for about 3 hours. She recalls it was sudden onset and with significant vision loss. She was directed to put visine into her eye and close her eye. She fell asleep and woke up within a few hours she could see again.     She is using visine get the red out about every other day now. She feels that her eyes are often red. Her eyes itchy feeling is worse in the morning.       Relevant Past Medical/Family/Social History: CAD, HTN    Relevant Review of Systems: None Relevant      1. Primary open angle glaucoma advanced each eye   Previous glaucoma surgery/laser: [place] both eyes, Right eye 9/05, Left eye 5/04  Maximum intraocular pressure unknown  Currently Meds:Latanoprost both eyes at bedtime, Cosopt both eyes twice a day  Meds AEs/intolerance: unknown   Cannot do visual field and OCT with floor effect (defer per Perry)    Today's testing:  IOP 11/10 doing well        Tubes covered and in good position         Dr. Luis in 6 mo    2. NAION left eye  3. Pseudophakic each eye   - Right eye 2-9-10  - Left eye 8-14-12    4. Allergic conjunctivitis each eye   - using Pataday as needed       Plan/Recommendations:    Continue Latanoprost both eyes at bedtime    Continue Cosopt both eyes twice a day    Continue Pataday as needed    Continue artificial tears 3 times daily    Start artificial tear ointment at bedtime both eyes    Stop visine red eyes solution     RTC 6 months juliano Ray MD  Ophthalmology Resident PGY3  [facility]     Attending Physician Attestation:  Complete documentation of historical and  exam elements from today's encounter can be found in the full encounter summary report (not reduplicated in this progress note).  I personally obtained the chief complaint(s) and history of present illness.  I confirmed and edited as necessary the review of systems, past medical/surgical history, family history, social history, and examination findings as documented by others; and I examined the patient myself.  I personally reviewed the relevant tests, images, and reports as documented above.  I formulated and edited as necessary the assessment and plan and discussed the findings and management plan with the patient and family. . - Tanmay Pinon MD

## 2022-01-05 NOTE — LETTER
1/5/2022        RE: Cristal Preciado  [facility]  [address] [facility] [address]        [facility]  Chief Complaint   Patient presents with     Annual Comprehensive Nursing Home     [facility] [facility] Record Number:  [id number]  Place of Service where encounter took place:  [facility] () [[id number]]    HPI:    Cristal Preicado  is a 71 year old  (1950), who is being seen today for an annual comprehensive visit. She resides   in the long-term care facility [facility]. She has been a resident here since October 2011. She does have multiple complex co- morbidities. She is treated for hypertension and has insulin-dependent diabetes mellitus. She has a endocrinologist who follows her  blood sugars regularly.  She has chronic neuropathy, chronic kidney disease and is treated with Cymbalta for depression. She is on gabapentin and lyrica for neuropathy pain. She is on meds for depression.  Does require a lift for transfers. She has chronic weakness in her lower extremities.      Today she is seen for an Annual Visit.  She denies chest pain or shortness of breath.  She denies cough or congestion.  She is  non ambulatory, She is on  gabapentin and lyrica for neuropathy.. Her BS are being controlled by her endocrinologist and being sent in every couple of weeks.  She denies cough or congestion.  She does have numbness and pain in her hands.  No open areas on her skin. Her weights were reviewed and she has been up and down 5 pounds over the last month. Staff to follow up to see if she is due for a mammogram or a colonoscopy. She is with chronic right shoulder pain and Voltaren gel added QID PRN          ALLERGIES: Dust mites, Food allergy formula, and Pollen extract  PAST MEDICAL HISTORY:   Past Medical History:   Diagnosis Date     AION (anterior ischaemic optic neuropathy), left eye     NAION LE     CAD (coronary artery disease) 3/2009    [facility]  [facility]; Angio  UM- normal coronary arteries     Cataract      CVA (cerebral vascular accident) (H)     admitted at [facility]     on Cymbalta     Diabetes mellitus, type 2 (H)      Diabetic nephropathy (H)      Diabetic neuropathy (H)     severe     Diabetic retinopathy (H)      GERD (gastroesophageal reflux disease)      Hyperlipidemia      Hypertension     ECHO 2013, TDS, NL EF     POAG (primary open-angle glaucoma)     adv BE     Seasonal allergies      Tubular adenoma of colon     repeat colonoscopy in 2018      PAST SURGICAL HISTORY:  has a past surgical history that includes Extracapsular cataract extration with intraocular lens implant (11-10-09, 2-9-10); Colonoscopy (7/15/2013);  section; stent, coronary, lainey (); DaVINCI hysterectomy total, bilateral salpingo-oophorectomy, combined (N/A, 2019); Colonoscopy (N/A, 2020); Coronary Stent Placement (2004); Cardiac catheterization; Hysterectomy total abdominal, bilateral salpingo-oophorectomy, combined (2019); Cataract Extraction W/ Intraocular Lens Implant (Bilateral); and  Section.  IMMUNIZATIONS:  Immunization History   Administered Date(s) Administered     COVID-19,PF,Moderna 2020, 2021, 2021     FLU 6-35 months 2009, 2010     Flu, Unspecified 10/26/2016, 10/12/2017, 10/15/2018, 10/11/2019, 10/22/2020     Influenza (High Dose) 3 valent vaccine 10/15/2018, 10/11/2019, 10/22/2020, 10/18/2021     Influenza (IIV3) PF 2003, 11/10/2005, 10/19/2006, 2009, 10/24/2011, 2012, 10/21/2015     Influenza Vaccine IM > 6 months Valent IIV4 (Alfuria,Fluzone) 2014     Influenza Vaccine IM Ages 6-35 Months 4 Valent (PF) 10/18/2021     Pneumococcal (PCV 7) 2003     Pneumococcal 23 valent 10/24/2011     TD (ADULT, 7+) 2003     TDAP Vaccine (Boostrix) 2014     Tdap (Adacel,Boostrix) 2014     Zoster vaccine, live 2013     Above  immunizations pulled from [facility].Future immunizations are not needed at this point as all recommended immunizations are up to date.       Current Outpatient Medications:      acetaminophen (TYLENOL) 500 MG tablet, Take 1,000 mg by mouth 3 times daily, Disp: , Rfl:      ASPIRIN LOW DOSE 81 MG chewable tablet, CHEW AND SWALLOW 1 TAB BY MOUTH ONCE DAILY IN THE MORNING, Disp: , Rfl: 99     atorvastatin (LIPITOR) 80 MG tablet, Take 1 tablet by mouth daily. Pt needs to have labs done prior to further refills., Disp: 90 tablet, Rfl: 0     benzocaine-menthol (CEPACOL) 15-3.6 MG lozenge, Place 1 lozenge inside cheek every 2 hours as needed for moderate pain, Disp: , Rfl:      bisacodyl (DULCOLAX) 10 MG suppository, Place 10 mg rectally daily as needed for constipation, Disp: , Rfl:      carboxymethylcellulose (REFRESH PLUS) 0.5 % SOLN, 1 drop 3 times daily as needed., Disp: , Rfl:      CHLORTHALIDONE PO, Take 25 mg by mouth every morning , Disp: , Rfl:      dorzolamide-timolol 2-0.5 % OP ophthalmic solution, Place 1 drop into both eyes 2 times daily, Disp: 1 Bottle, Rfl: 11     DULoxetine HCl (CYMBALTA PO), Take 90 mg by mouth every morning , Disp: , Rfl:      folic acid (FOLVITE) 1 MG tablet, Take 1 mg by mouth every morning , Disp: , Rfl:      gabapentin (NEURONTIN) 300 MG capsule, Take 300 mg by mouth 3 times daily, Disp: , Rfl:      insulin glargine U-300 (TOUJEO) 300 UNIT/ML (1 units dial) pen, Inject 55 Units Subcutaneous At Bedtime, Disp: , Rfl:      ketoconazole (NIZORAL) 2 % shampoo, To entire wet scalp and ears and then wash off after 5 minutes three times a week., Disp: 240 mL, Rfl: 11     latanoprost (XALATAN) 0.005 % ophthalmic solution, Place 1 drop into both eyes daily, Disp: , Rfl:      liraglutide (VICTOZA PEN) 18 MG/3ML solution, Inject 1.2 mg Subcutaneous daily, Disp: , Rfl:      loperamide (IMODIUM A-D) 2 MG tablet, Take 2 mg by mouth 4 times daily as needed for diarrhea, Disp: , Rfl:       loratadine (CLARITIN) 10 MG tablet, Take 10 mg by mouth as needed., Disp: , Rfl:      metFORMIN (GLUCOPHAGE-XR) 500 MG 24 hr tablet, Take 2 tablets (1,000 mg) by mouth daily (with dinner), Disp: 180 tablet, Rfl: 3     metoprolol tartrate (LOPRESSOR) 100 MG tablet, Take 100 mg by mouth 2 times daily, Disp: , Rfl:      nitroGLYCERIN (NITROSTAT) 0.4 MG SL tablet, Place 0.4 mg under the tongue every 5 minutes as needed., Disp: , Rfl:      olopatadine HCl (PATADAY) 0.2 % SOLN, Place 1 drop into both eyes daily as needed For itchy eyes, Disp: 1 Bottle, Rfl: 5     omeprazole (PRILOSEC) 20 MG DR capsule, Take 20 mg by mouth, Disp: , Rfl:      pregabalin (LYRICA) 75 MG capsule, Take 1 capsule (75 mg) by mouth 3 times daily, Disp: 90 capsule, Rfl: 3     senna-docusate (SENOKOT-S/PERICOLACE) 8.6-50 MG tablet, Take 2 tablets by mouth 2 times daily as needed for constipation, Disp: 60 tablet, Rfl: 0     simethicone (MYLICON) 80 MG chewable tablet, Take 80 mg by mouth 4 times daily, Disp: , Rfl:      tetrahydrozoline (VISINE) 0.05 % ophthalmic solution, Place 2 drops into both eyes 4 times daily, Disp: , Rfl:      Case Management:  I have reviewed the facility/SNF care plan/MDS, including the falls risk, nutrition and pain screening. I also reviewed the current immunizations, and preventive care.. Future cancer screening is not clinically indicated secondary to age/goals of care Patient's desire to return to the community is not present. Current Level of Care is appropriate.      MDS reviewed 10/22/21 and due again 1/19/22    Advance Directive Discussion:    I reviewed the current advanced directives as reflected in EPIC, the POLST and the facility chart, and verified the congruency of orders  I did not due to cognitive impairment review the advance directives with the resident. Patient's goal is pain control and comfort.    Team Discussion:  I communicated with the appropriate disciplines involved with the Plan of Care:    "Nursing    Information reviewed:  Medications, vital signs, orders, and nursing notes.    ROS:  Constitutional: Positive for activity change. Negative for appetite change, chills, fatigue and fever.        Lift for transfers.    HENT: Negative for congestion and sore throat.    Respiratory: Negative for shortness of breath and wheezing.    Cardiovascular: negative for leg swelling . Negative for chest pain. (intermittent Right  upper chest discomfort. She was seen by cardiology and no known cause found.    compression stockings   Gastrointestinal: Negative for abdominal distention, abdominal pain, constipation, diarrhea and nausea.   Genitourinary: Negative for dysuria.   Musculoskeletal: Positive for arthralgias. Negative for myalgias.   Skin: Negative for color change, rash and wound.   Neurological: Positive for numbness. Negative for dizziness and weakness.         neuropathy bilateral hands   Psychiatric/Behavioral: Negative for agitation, behavioral problems and sleep disturbance.     Vitals:  BP (!) 150/84   Pulse 84   Temp 98  F (36.7  C)   Resp 20   Ht 1.6 m (5' 3\")   Wt 70.8 kg (156 lb)   SpO2 94%   BMI 27.63 kg/m   Body mass index is 27.63 kg/m .  Exam:   Abdominal: She exhibits distension.  Abdomen is soft  Skin:    healed old lap sites on abdomen     Constitutional: She is oriented to person, place, and time. She appears well-developed and well-nourished.   Pleasant woman in no acute distress.   HENT:   Head: Normocephalic.   Eyes: Conjunctivae are normal.   Neck: Normal range of motion.   Cardiovascular: Normal rate, regular rhythm and normal heart sounds.   No murmur heard.  Pulmonary/Chest: Breath sounds normal. No respiratory distress. She has no wheezes. She has no rales.   Abdominal: Soft. Bowel sounds are normal. She exhibits no distension. There is no tenderness.   Musculoskeletal: She exhibits edema.   Wearing Compression socks   Neurological: She is alert and oriented to person, place, " and time.   Neuropathy bilateral hands.   Skin: Skin is warm.   Psychiatric: She has a normal mood and affect. Her behavior is normal    Lab/Diagnostic data:   Recent Results (from the past 240 hour(s))   COVID-19 Virus (Coronavirus) by PCR Nose    Collection Time: 12/30/21  1:37 PM    Specimen: Nose; Swab   Result Value Ref Range    SARS CoV2 PCR Negative Negative   COVID-19 Virus (Coronavirus) by PCR Nasopharyngeal    Collection Time: 01/04/22  6:00 AM    Specimen: Nasopharyngeal; Swab   Result Value Ref Range    SARS CoV2 PCR  Negative     Testing sent to reference lab. Results will be returned via unsolicited result       ASSESSMENT/PLAN    Chronic right shoulder pain- Voltaren gel topically QID PRN, Tylenol TID    IDDM. FS are stable last A1C 8.1 on 6/17/2021. Endocrinologist following , Continue current diabetic medications as above.       HTN. Continue  Lisinpril and metoprolol-stable     Neuropathy   On lyrica and gabapentin.     CAD. HX  MI and stent.      S/p Hysterectomy in October 2019     Right knee pain continue pain medications notify provider for any increased changes     Electronically signed by:  Shawna Mesa CNP           Sincerely,        Shawna Mesa CNP

## 2022-01-05 NOTE — NURSING NOTE
Chief Complaints and History of Present Illnesses   Patient presents with     Follow Up     Chief Complaint(s) and History of Present Illness(es)     Follow Up               Comments     Pt presents herself for a follow up visit. Pt states that her eyes are still itching a lot, and that puts in ATs but that only alleviates the itching for a little bit. Pt says that her vision occasionally gets blurry.    DM2  BGL: unknown   Lab Results       Component                Value               Date                       A1C                      8.1                 06/17/2021                 A1C                      6.5                 11/27/2020                 A1C                      6.9                 03/18/2020                 A1C                      6.6                 07/23/2019                 A1C                      7.0                 01/10/2019                 A1C                      8.3                 09/07/2018                 A1C                      7.5                 10/24/2016                 A1C                      7.5                 10/12/2015                 A1C                      8.1                 03/06/2014                 A1C                      7.2                 03/07/2013              Siddhartha Gomez OT 12:47 PM January 5, 2022

## 2022-01-06 ENCOUNTER — LAB REQUISITION (OUTPATIENT)
Dept: LAB | Facility: CLINIC | Age: 72
End: 2022-01-06
Payer: COMMERCIAL

## 2022-01-06 DIAGNOSIS — Z11.59 ENCOUNTER FOR SCREENING FOR OTHER VIRAL DISEASES: ICD-10-CM

## 2022-01-06 LAB — SARS-COV-2 RNA RESP QL NAA+PROBE: NOT DETECTED

## 2022-01-07 PROCEDURE — U0003 INFECTIOUS AGENT DETECTION BY NUCLEIC ACID (DNA OR RNA); SEVERE ACUTE RESPIRATORY SYNDROME CORONAVIRUS 2 (SARS-COV-2) (CORONAVIRUS DISEASE [COVID-19]), AMPLIFIED PROBE TECHNIQUE, MAKING USE OF HIGH THROUGHPUT TECHNOLOGIES AS DESCRIBED BY CMS-2020-01-R: HCPCS | Mod: ORL | Performed by: FAMILY MEDICINE

## 2022-01-09 LAB — SARS-COV-2 RNA RESP QL NAA+PROBE: NEGATIVE

## 2022-01-10 ENCOUNTER — LAB REQUISITION (OUTPATIENT)
Dept: LAB | Facility: CLINIC | Age: 72
End: 2022-01-10
Payer: COMMERCIAL

## 2022-01-10 ENCOUNTER — MEDICAL CORRESPONDENCE (OUTPATIENT)
Dept: HEALTH INFORMATION MANAGEMENT | Facility: CLINIC | Age: 72
End: 2022-01-10
Payer: COMMERCIAL

## 2022-01-10 DIAGNOSIS — Z11.59 ENCOUNTER FOR SCREENING FOR OTHER VIRAL DISEASES: ICD-10-CM

## 2022-01-11 PROCEDURE — U0003 INFECTIOUS AGENT DETECTION BY NUCLEIC ACID (DNA OR RNA); SEVERE ACUTE RESPIRATORY SYNDROME CORONAVIRUS 2 (SARS-COV-2) (CORONAVIRUS DISEASE [COVID-19]), AMPLIFIED PROBE TECHNIQUE, MAKING USE OF HIGH THROUGHPUT TECHNOLOGIES AS DESCRIBED BY CMS-2020-01-R: HCPCS | Mod: ORL | Performed by: FAMILY MEDICINE

## 2022-01-12 LAB — SARS-COV-2 RNA RESP QL NAA+PROBE: NEGATIVE

## 2022-01-13 ENCOUNTER — LAB REQUISITION (OUTPATIENT)
Dept: LAB | Facility: CLINIC | Age: 72
End: 2022-01-13
Payer: COMMERCIAL

## 2022-01-13 DIAGNOSIS — Z11.59 ENCOUNTER FOR SCREENING FOR OTHER VIRAL DISEASES: ICD-10-CM

## 2022-01-14 PROCEDURE — U0005 INFEC AGEN DETEC AMPLI PROBE: HCPCS | Mod: ORL | Performed by: FAMILY MEDICINE

## 2022-01-16 LAB — SARS-COV-2 RNA RESP QL NAA+PROBE: NEGATIVE

## 2022-01-18 ENCOUNTER — LAB REQUISITION (OUTPATIENT)
Dept: LAB | Facility: CLINIC | Age: 72
End: 2022-01-18
Payer: COMMERCIAL

## 2022-01-18 DIAGNOSIS — Z11.59 ENCOUNTER FOR SCREENING FOR OTHER VIRAL DISEASES: ICD-10-CM

## 2022-01-18 PROCEDURE — U0003 INFECTIOUS AGENT DETECTION BY NUCLEIC ACID (DNA OR RNA); SEVERE ACUTE RESPIRATORY SYNDROME CORONAVIRUS 2 (SARS-COV-2) (CORONAVIRUS DISEASE [COVID-19]), AMPLIFIED PROBE TECHNIQUE, MAKING USE OF HIGH THROUGHPUT TECHNOLOGIES AS DESCRIBED BY CMS-2020-01-R: HCPCS | Mod: ORL | Performed by: FAMILY MEDICINE

## 2022-01-19 ENCOUNTER — LAB REQUISITION (OUTPATIENT)
Dept: LAB | Facility: CLINIC | Age: 72
End: 2022-01-19
Payer: COMMERCIAL

## 2022-01-19 DIAGNOSIS — Z11.59 ENCOUNTER FOR SCREENING FOR OTHER VIRAL DISEASES: ICD-10-CM

## 2022-01-20 ENCOUNTER — LAB REQUISITION (OUTPATIENT)
Dept: LAB | Facility: CLINIC | Age: 72
End: 2022-01-20
Payer: COMMERCIAL

## 2022-01-20 DIAGNOSIS — Z11.59 ENCOUNTER FOR SCREENING FOR OTHER VIRAL DISEASES: ICD-10-CM

## 2022-01-20 LAB — SARS-COV-2 RNA RESP QL NAA+PROBE: NOT DETECTED

## 2022-01-21 ENCOUNTER — TELEPHONE (OUTPATIENT)
Dept: ENDOCRINOLOGY | Facility: CLINIC | Age: 72
End: 2022-01-21
Payer: COMMERCIAL

## 2022-01-21 PROCEDURE — U0005 INFEC AGEN DETEC AMPLI PROBE: HCPCS | Mod: ORL | Performed by: FAMILY MEDICINE

## 2022-01-21 NOTE — TELEPHONE ENCOUNTER
----- Message from Jones Daniels MD sent at 1/21/2022  4:21 PM CST -----  Regarding: RE: blood glucose  Please increase Toujeo to 60 units daily for now.   Continue the rest the same.    Thanks,Jones  ----- Message -----  From: Chelo Marcelo RN  Sent: 1/21/2022   4:01 PM CST  To: Jones Daniels MD  Subject: FW: blood glucose                                Victoza  1.2 mg per day   Metformin 500 XR  1 tabs daily   Toujeo U300 - 55 units daily       ----- Message -----  From: Jones Daniels MD  Sent: 1/21/2022   3:49 PM CST  To: [facility] Triage-  Subject: blood glucose                                    Hello,    I reviewed BG from nursing home you sent to me. Can you confirm me her diabetes medication regimen? Toujeo dose?    ThanksJones

## 2022-01-21 NOTE — TELEPHONE ENCOUNTER
New orders per \[facility]\"" faxed to [facility]  [phone] for increase Toujeo to 60 units  Daily benji, . No  answer when called the facility Chelo Marcelo RN on 1/21/2022 at 4:34 PM

## 2022-01-23 LAB — SARS-COV-2 RNA RESP QL NAA+PROBE: NEGATIVE

## 2022-01-24 ENCOUNTER — LAB REQUISITION (OUTPATIENT)
Dept: LAB | Facility: CLINIC | Age: 72
End: 2022-01-24
Payer: COMMERCIAL

## 2022-01-24 DIAGNOSIS — Z11.59 ENCOUNTER FOR SCREENING FOR OTHER VIRAL DISEASES: ICD-10-CM

## 2022-01-25 PROCEDURE — U0005 INFEC AGEN DETEC AMPLI PROBE: HCPCS | Mod: ORL | Performed by: FAMILY MEDICINE

## 2022-01-26 ENCOUNTER — LAB REQUISITION (OUTPATIENT)
Dept: LAB | Facility: CLINIC | Age: 72
End: 2022-01-26
Payer: COMMERCIAL

## 2022-01-26 DIAGNOSIS — Z11.59 ENCOUNTER FOR SCREENING FOR OTHER VIRAL DISEASES: ICD-10-CM

## 2022-01-27 ENCOUNTER — MEDICAL CORRESPONDENCE (OUTPATIENT)
Dept: HEALTH INFORMATION MANAGEMENT | Facility: CLINIC | Age: 72
End: 2022-01-27
Payer: COMMERCIAL

## 2022-01-27 ENCOUNTER — LAB REQUISITION (OUTPATIENT)
Dept: LAB | Facility: CLINIC | Age: 72
End: 2022-01-27
Payer: COMMERCIAL

## 2022-01-27 DIAGNOSIS — Z11.59 ENCOUNTER FOR SCREENING FOR OTHER VIRAL DISEASES: ICD-10-CM

## 2022-01-27 LAB — SARS-COV-2 RNA RESP QL NAA+PROBE: NOT DETECTED

## 2022-01-28 PROCEDURE — U0003 INFECTIOUS AGENT DETECTION BY NUCLEIC ACID (DNA OR RNA); SEVERE ACUTE RESPIRATORY SYNDROME CORONAVIRUS 2 (SARS-COV-2) (CORONAVIRUS DISEASE [COVID-19]), AMPLIFIED PROBE TECHNIQUE, MAKING USE OF HIGH THROUGHPUT TECHNOLOGIES AS DESCRIBED BY CMS-2020-01-R: HCPCS | Mod: ORL | Performed by: FAMILY MEDICINE

## 2022-01-29 LAB — SARS-COV-2 RNA RESP QL NAA+PROBE: NEGATIVE

## 2022-02-01 ENCOUNTER — LAB REQUISITION (OUTPATIENT)
Dept: LAB | Facility: CLINIC | Age: 72
End: 2022-02-01
Payer: COMMERCIAL

## 2022-02-01 DIAGNOSIS — Z11.59 ENCOUNTER FOR SCREENING FOR OTHER VIRAL DISEASES: ICD-10-CM

## 2022-02-01 PROCEDURE — U0005 INFEC AGEN DETEC AMPLI PROBE: HCPCS | Mod: ORL | Performed by: FAMILY MEDICINE

## 2022-02-02 LAB — SARS-COV-2 RNA RESP QL NAA+PROBE: NOT DETECTED

## 2022-02-03 ENCOUNTER — LAB REQUISITION (OUTPATIENT)
Dept: LAB | Facility: CLINIC | Age: 72
End: 2022-02-03
Payer: COMMERCIAL

## 2022-02-03 ENCOUNTER — ANCILLARY PROCEDURE (OUTPATIENT)
Dept: MAMMOGRAPHY | Facility: CLINIC | Age: 72
End: 2022-02-03
Attending: NURSE PRACTITIONER
Payer: COMMERCIAL

## 2022-02-03 DIAGNOSIS — N64.4 BREAST PAIN, RIGHT: ICD-10-CM

## 2022-02-03 DIAGNOSIS — Z11.59 ENCOUNTER FOR SCREENING FOR OTHER VIRAL DISEASES: ICD-10-CM

## 2022-02-03 PROCEDURE — G0279 TOMOSYNTHESIS, MAMMO: HCPCS | Performed by: RADIOLOGY

## 2022-02-03 PROCEDURE — 77066 DX MAMMO INCL CAD BI: CPT | Performed by: RADIOLOGY

## 2022-02-04 PROCEDURE — U0003 INFECTIOUS AGENT DETECTION BY NUCLEIC ACID (DNA OR RNA); SEVERE ACUTE RESPIRATORY SYNDROME CORONAVIRUS 2 (SARS-COV-2) (CORONAVIRUS DISEASE [COVID-19]), AMPLIFIED PROBE TECHNIQUE, MAKING USE OF HIGH THROUGHPUT TECHNOLOGIES AS DESCRIBED BY CMS-2020-01-R: HCPCS | Mod: ORL | Performed by: FAMILY MEDICINE

## 2022-02-05 LAB — SARS-COV-2 RNA RESP QL NAA+PROBE: NEGATIVE

## 2022-02-07 ENCOUNTER — LAB REQUISITION (OUTPATIENT)
Dept: LAB | Facility: CLINIC | Age: 72
End: 2022-02-07
Payer: COMMERCIAL

## 2022-02-07 DIAGNOSIS — Z11.59 ENCOUNTER FOR SCREENING FOR OTHER VIRAL DISEASES: ICD-10-CM

## 2022-02-08 PROCEDURE — U0003 INFECTIOUS AGENT DETECTION BY NUCLEIC ACID (DNA OR RNA); SEVERE ACUTE RESPIRATORY SYNDROME CORONAVIRUS 2 (SARS-COV-2) (CORONAVIRUS DISEASE [COVID-19]), AMPLIFIED PROBE TECHNIQUE, MAKING USE OF HIGH THROUGHPUT TECHNOLOGIES AS DESCRIBED BY CMS-2020-01-R: HCPCS | Mod: ORL | Performed by: FAMILY MEDICINE

## 2022-02-09 LAB — SARS-COV-2 RNA RESP QL NAA+PROBE: NEGATIVE

## 2022-02-10 ENCOUNTER — MEDICAL CORRESPONDENCE (OUTPATIENT)
Dept: HEALTH INFORMATION MANAGEMENT | Facility: CLINIC | Age: 72
End: 2022-02-10
Payer: COMMERCIAL

## 2022-02-10 ENCOUNTER — LAB REQUISITION (OUTPATIENT)
Dept: LAB | Facility: CLINIC | Age: 72
End: 2022-02-10
Payer: COMMERCIAL

## 2022-02-10 DIAGNOSIS — Z11.59 ENCOUNTER FOR SCREENING FOR OTHER VIRAL DISEASES: ICD-10-CM

## 2022-02-11 PROCEDURE — U0003 INFECTIOUS AGENT DETECTION BY NUCLEIC ACID (DNA OR RNA); SEVERE ACUTE RESPIRATORY SYNDROME CORONAVIRUS 2 (SARS-COV-2) (CORONAVIRUS DISEASE [COVID-19]), AMPLIFIED PROBE TECHNIQUE, MAKING USE OF HIGH THROUGHPUT TECHNOLOGIES AS DESCRIBED BY CMS-2020-01-R: HCPCS | Mod: ORL | Performed by: FAMILY MEDICINE

## 2022-02-12 LAB — SARS-COV-2 RNA RESP QL NAA+PROBE: NEGATIVE

## 2022-02-14 ENCOUNTER — LAB REQUISITION (OUTPATIENT)
Dept: LAB | Facility: CLINIC | Age: 72
End: 2022-02-14
Payer: COMMERCIAL

## 2022-02-14 DIAGNOSIS — Z11.59 ENCOUNTER FOR SCREENING FOR OTHER VIRAL DISEASES: ICD-10-CM

## 2022-02-15 PROCEDURE — U0003 INFECTIOUS AGENT DETECTION BY NUCLEIC ACID (DNA OR RNA); SEVERE ACUTE RESPIRATORY SYNDROME CORONAVIRUS 2 (SARS-COV-2) (CORONAVIRUS DISEASE [COVID-19]), AMPLIFIED PROBE TECHNIQUE, MAKING USE OF HIGH THROUGHPUT TECHNOLOGIES AS DESCRIBED BY CMS-2020-01-R: HCPCS | Mod: ORL | Performed by: FAMILY MEDICINE

## 2022-02-16 LAB — SARS-COV-2 RNA RESP QL NAA+PROBE: NEGATIVE

## 2022-02-28 DIAGNOSIS — E11.42 DIABETIC POLYNEUROPATHY ASSOCIATED WITH TYPE 2 DIABETES MELLITUS (H): ICD-10-CM

## 2022-03-01 RX ORDER — PREGABALIN 75 MG/1
75 CAPSULE ORAL 3 TIMES DAILY
Qty: 90 CAPSULE | Refills: 3 | OUTPATIENT
Start: 2022-03-01

## 2022-03-10 ENCOUNTER — MEDICAL CORRESPONDENCE (OUTPATIENT)
Dept: HEALTH INFORMATION MANAGEMENT | Facility: CLINIC | Age: 72
End: 2022-03-10
Payer: COMMERCIAL

## 2022-03-21 ENCOUNTER — OFFICE VISIT (OUTPATIENT)
Dept: ENDOCRINOLOGY | Facility: CLINIC | Age: 72
End: 2022-03-21
Payer: COMMERCIAL

## 2022-03-21 VITALS — SYSTOLIC BLOOD PRESSURE: 122 MMHG | DIASTOLIC BLOOD PRESSURE: 41 MMHG

## 2022-03-21 DIAGNOSIS — Z79.4 TYPE 2 DIABETES MELLITUS WITH DIABETIC NEPHROPATHY, WITH LONG-TERM CURRENT USE OF INSULIN (H): Primary | ICD-10-CM

## 2022-03-21 DIAGNOSIS — E11.21 TYPE 2 DIABETES MELLITUS WITH DIABETIC NEPHROPATHY, WITH LONG-TERM CURRENT USE OF INSULIN (H): Primary | ICD-10-CM

## 2022-03-21 LAB — HBA1C MFR BLD: 7.8 % (ref 4.3–?)

## 2022-03-21 PROCEDURE — 99214 OFFICE O/P EST MOD 30 MIN: CPT | Performed by: PHYSICIAN ASSISTANT

## 2022-03-21 PROCEDURE — 83036 HEMOGLOBIN GLYCOSYLATED A1C: CPT | Performed by: PHYSICIAN ASSISTANT

## 2022-03-21 ASSESSMENT — PAIN SCALES - GENERAL: PAINLEVEL: MODERATE PAIN (4)

## 2022-03-21 NOTE — LETTER
3/21/2022       RE: Cristal Preciado  [facility]  [address] [facility] [id number]     Dear Colleague,    Thank you for referring your patient, Cristal Preciado, to the [facility] [place] at [facility]. Please see a copy of my visit note below.    HPI:   Ms. Preciado is a 72 yo woman here for follow up of type 2 diabetes.  At her last visit, she was complaining of a low appetite, nausea and not feeling well.  We decided to reduce the Victoza to 1.2 mg and stopped the jardiance (she had yeast infections). She says she is feeling much better and her appetite is good.   Her weight has stabilized.     Cristal's current diabetes treatment includes:   Victoza 1.8 mg daily (several years ago)  Metformin 1000 mg daily (added in )  Toujeo 60 units daily (switched from U-500 in )   Her insulin requirements have come down significantly.       Previous treatments:   Jardiance 10 mg daily (started )- stopped in  due to yeast infections.     Recent glucose is as follows:   7:30am- 115-270  11:30am- 194-399  4:30pm-   8:00pm- 133-428    Rosalva (RN) reports Cristal recently bought a lot of candy and has been snacking on this frequently.     TSH: 1.05- 3/8/21  ALT: 35  A1c: 6.5%- 21  Microalbumin: 21- 4.99      Her wheelchair is uncomfortable. No other concerns today.     PMH   Type 2 diabetes  Neuropathy   Nephropathy  Stroke - mainly in wheelchair. Would like to start to walk again.    CAD, s/p stent   HTN  Dyslipidemia  Cataracts  Glaucoma  GERD  Pressure sore on bottom.     Family Hx:   Mom- stroke  Dad- cancer  1 of 12 children  2 brothers and 2 sisters-  cancer- unsure of type  1 sister with diabetes, on insulin  5 children  Son- MS, milder  Daughter, April- MS  Other kids- healthy  6 grandchildren    Social Hx:   Ms. Preciado lives at [facility].  She is  seeing her family about 1-2 times a month now. Has 5 children, all now living in [facility]. Daughter has MS and is in a nursing home.  6 grandchildren.  Originally from [facility].     Current Medications  Current Outpatient Medications   Medication Sig Dispense Refill     acetaminophen (TYLENOL) 500 MG tablet Take 1,000 mg by mouth 3 times daily       ASPIRIN LOW DOSE 81 MG chewable tablet CHEW AND SWALLOW 1 TAB BY MOUTH ONCE DAILY IN THE MORNING  99     atorvastatin (LIPITOR) 80 MG tablet Take 1 tablet by mouth daily. Pt needs to have labs done prior to further refills. 90 tablet 0     benzocaine-menthol (CEPACOL) 15-3.6 MG lozenge Place 1 lozenge inside cheek every 2 hours as needed for moderate pain       bisacodyl (DULCOLAX) 10 MG suppository Place 10 mg rectally daily as needed for constipation       carboxymethylcellulose (REFRESH PLUS) 0.5 % SOLN 1 drop 3 times daily as needed.       CHLORTHALIDONE PO Take 25 mg by mouth every morning        dorzolamide-timolol 2-0.5 % OP ophthalmic solution Place 1 drop into both eyes 2 times daily 1 Bottle 11     DULoxetine HCl (CYMBALTA PO) Take 90 mg by mouth every morning        folic acid (FOLVITE) 1 MG tablet Take 1 mg by mouth every morning        gabapentin (NEURONTIN) 300 MG capsule Take 300 mg by mouth 3 times daily       insulin glargine U-300 (TOUJEO) 300 UNIT/ML (1 units dial) pen Inject 55 Units Subcutaneous At Bedtime       ketoconazole (NIZORAL) 2 % shampoo To entire wet scalp and ears and then wash off after 5 minutes three times a week. 240 mL 11     latanoprost (XALATAN) 0.005 % ophthalmic solution Place 1 drop into both eyes daily       liraglutide (VICTOZA PEN) 18 MG/3ML solution Inject 1.2 mg Subcutaneous daily       loperamide (IMODIUM A-D) 2 MG tablet Take 2 mg by mouth 4 times daily as needed for diarrhea       loratadine (CLARITIN) 10 MG tablet Take 10 mg by mouth as needed.       metFORMIN (GLUCOPHAGE-XR) 500 MG 24 hr tablet Take 2 tablets  (1,000 mg) by mouth daily (with dinner) 180 tablet 3     metoprolol tartrate (LOPRESSOR) 100 MG tablet Take 100 mg by mouth 2 times daily       nitroGLYCERIN (NITROSTAT) 0.4 MG SL tablet Place 0.4 mg under the tongue every 5 minutes as needed.       olopatadine HCl (PATADAY) 0.2 % SOLN Place 1 drop into both eyes daily as needed For itchy eyes 1 Bottle 5     omeprazole (PRILOSEC) 20 MG DR capsule Take 20 mg by mouth       pregabalin (LYRICA) 75 MG capsule Take 1 capsule (75 mg) by mouth 3 times daily 90 capsule 3     senna-docusate (SENOKOT-S/PERICOLACE) 8.6-50 MG tablet Take 2 tablets by mouth 2 times daily as needed for constipation 60 tablet 0     simethicone (MYLICON) 80 MG chewable tablet Take 80 mg by mouth 4 times daily       tetrahydrozoline (VISINE) 0.05 % ophthalmic solution Place 2 drops into both eyes 4 times daily       Past Medical History:   Diagnosis Date     AION (anterior ischaemic optic neuropathy), left eye     NAION LE     CAD (coronary artery disease) 3/2009    [facility]; Angio  UM- normal coronary arteries     Cataract      CVA (cerebral vascular accident) (H)     admitted at [facility]     on Cymbalta     Diabetes mellitus, type 2 (H)      Diabetic nephropathy (H)      Diabetic neuropathy (H)     severe     Diabetic retinopathy (H)      GERD (gastroesophageal reflux disease)      Hyperlipidemia      Hypertension     ECHO , TDS, NL EF     POAG (primary open-angle glaucoma)     adv BE     Seasonal allergies      Tubular adenoma of colon     repeat colonoscopy in        Past Surgical History:   Procedure Laterality Date     CARDIAC CATHETERIZATION       CATARACT EXTRACTION W/ INTRAOCULAR LENS IMPLANT Bilateral       SECTION        SECTION       COLONOSCOPY  7/15/2013    Tubular adenoma; repeat in 2018;Procedure: COMBINED COLONOSCOPY, SINGLE BIOPSY/POLYPECTOMY BY BIOPSY;;  Surgeon: Don King MD;  Tubular adenoma     COLONOSCOPY  N/A 2020    Procedure: COLONOSCOPY;  Surgeon: Sid Amanda MD;  Location: [facility] GI     CORONARY STENT PLACEMENT  2004    RCA     DAVINCI HYSTERECTOMY TOTAL, BILATERAL SALPINGO-OOPHORECTOMY, COMBINED N/A 2019    Procedure: DaVinci Assisted Total Laparoscopic Hysterectomy, Removal Of Both Tubes And Ovaries;  Surgeon: Linh Cardoso MD;  Location: [facility] OR     EXTRACAPSULAR CATARACT EXTRATION WITH INTRAOCULAR LENS IMPLANT  11-10-09, 2-9-10    11-10-09 Lt, 2-9-10 Rt; Left eye 2012     HYSTERECTOMY TOTAL ABDOMINAL, BILATERAL SALPINGO-OOPHORECTOMY, COMBINED  2019    Procedure: DaVinci Assisted Total Laparoscopic Hysterectomy, Removal Of Both Tubes And Ovaries; Surgeon: Linh Cardoso MD; Location: [facility] OR       STENT, CORONARY, DELORES      RCA       Family History   Problem Relation Age of Onset     Hypertension Mother      Cerebrovascular Disease Mother      Glaucoma Father      Cancer Father      Diabetes Sister      Glaucoma Sister      Cancer - colorectal Other      Cerebrovascular Disease Other      Skin Cancer No family hx of      Melanoma No family hx of      Anesthesia Reaction No family hx of      Deep Vein Thrombosis (DVT) No family hx of      Arthritis Brother      Diabetes Sister      Glaucoma Sister        Social History     Socioeconomic History     Marital status: Single     Spouse name: Not on file     Number of children: Not on file     Years of education: Not on file     Highest education level: Not on file   Occupational History     Not on file   Social Needs     Financial resource strain: Not on file     Food insecurity:     Worry: Not on file     Inability: Not on file     Transportation needs:     Medical: Not on file     Non-medical: Not on file   Tobacco Use     Smoking status: Former Smoker     Packs/day: 1.50     Years: 45.00     Pack years: 67.50     Types: Cigarettes     Start date: 1968     Last attempt to quit: 3/6/2013     Years since quittin.4      Smokeless tobacco: Never Used   Substance and Sexual Activity     Alcohol use: Not Currently     Drug use: Never     Sexual activity: Not Currently   Lifestyle     Physical activity:     Days per week: Not on file     Minutes per session: Not on file     Stress: Not on file   Relationships     Social connections:     Talks on phone: Not on file     Gets together: Not on file     Attends L[employer]-day service: Not on file     Active member of club or organization: Not on file     Attends meetings of clubs or organizations: Not on file     Relationship status: Not on file     Intimate partner violence:     Fear of current or ex partner: Not on file     Emotionally abused: Not on file     Physically abused: Not on file     Forced sexual activity: Not on file   Other Topics Concern     Parent/sibling w/ CABG, MI or angioplasty before 65F 55M? Not Asked   Social History Narrative    Pt has three daughters and two sons, single.  Her daughter Court, see's her often at the Nursing Home ([facility]).  Moved into Nursing Home in October 2011 after a hospitalization for ALY.  Moved from [place] in 2002 to [facility] [place] [facility]. Has 5 grandchildren.       Physical Exam  There were no vitals taken for this visit.  GENERAL:  Alert and oriented X3, NAD, well dressed, answering questions appropriately, appears stated age.  HEENT: OP clear, no lymphadenopathy, no thyromegaly, non-tender, no exophthalmus, no proptosis, EOMI, no lid lag, no retraction  EXTREMITIES: no edema, +pulses, no rashes, no lesions      RESULTS  Lab Results   Component Value Date    A1C 8.1 (H) 06/17/2021    A1C 6.5 (H) 11/27/2020    A1C 6.9 (H) 03/18/2020    A1C 6.6 (H) 07/23/2019    A1C 7.0 (H) 01/10/2019    A1C 8.3 (H) 09/07/2018    A1C 7.5 (H) 10/24/2016    A1C 7.5 (H) 10/12/2015    A1C 8.1 (H) 03/06/2014    A1C 7.2 (H) 03/07/2013    HEMOGLOBINA1 6.4 11/15/2021    HEMOGLOBINA1 6.6 (A) 01/06/2020    HEMOGLOBINA1 6.5 (A) 05/24/2019    HEMOGLOBINA1 7.1 (A)  10/10/2018    HEMOGLOBINA1 7.1 (A) 03/16/2018       TSH   Date Value Ref Range Status   03/08/2021 1.05 0.30 - 5.00 uIU/mL Final   01/07/2020 1.04 0.30 - 5.00 uIU/mL Final   08/27/2019 1.32 0.30 - 5.00 uIU/mL Final   10/24/2016 1.20 0.40 - 4.00 mU/L Final   10/12/2015 1.18 0.40 - 4.00 mU/L Final   08/21/2014 1.24 0.40 - 4.00 mU/L Final     Comment:     Effective 7/30/2014, the reference range for this assay has changed to reflect   new instrumentation/methodology.     08/05/2013 1.12 0.4 - 5.0 mU/L Final   07/15/2010 0.53 0.4 - 5.0 mU/L Final     T4 Total   Date Value Ref Range Status   10/30/2008 10.6 5.0 - 11.0 ug/dL Final     T4 Free   Date Value Ref Range Status   04/21/2006 1.04 0.70 - 1.85 ng/dL Final   09/23/2005 1.58 0.70 - 1.85 ng/dL Final       ALT   Date Value Ref Range Status   03/08/2021 35 0 - 45 U/L Final   11/27/2020 25 0 - 45 U/L Final   10/12/2015 39 0 - 50 U/L Final   03/06/2014 36 0 - 50 U/L Final   ]    Recent Labs   Lab Test 11/18/21  0544 11/27/20  0456 04/15/15  0620 08/21/14  0935   CHOL 92 103   < > 110   HDL 26* 32*   < > 43*   LDL 30 43   < > 43   TRIG 180* 138   < > 122   CHOLHDLRATIO  --   --   --  2.6    < > = values in this interval not displayed.       Lab Results   Component Value Date     10/28/2021     08/06/2019      Lab Results   Component Value Date    POTASSIUM 3.7 10/28/2021    POTASSIUM 4.8 08/06/2019     Lab Results   Component Value Date    CHLORIDE 103 10/28/2021    CHLORIDE 104 08/06/2019     Lab Results   Component Value Date    OLAF 10.4 10/28/2021    OLAF 8.8 08/06/2019     Lab Results   Component Value Date    CO2 22 10/28/2021    CO2 19 08/06/2019     Lab Results   Component Value Date    BUN 33 10/28/2021    BUN 24 08/06/2019     Lab Results   Component Value Date    CR 1.03 10/28/2021    CR 0.98 08/06/2019       GFR Estimate   Date Value Ref Range Status   10/28/2021 56 (L) >60 mL/min/1.73m2 Final     Comment:     As of July 11, 2021, eGFR is calculated  by the CKD-EPI creatinine equation, without race adjustment. eGFR can be influenced by muscle mass, exercise, and diet. The reported eGFR is an estimation only and is only applicable if the renal function is stable.   06/17/2021 47 (L) >60 mL/min/1.73m2 Final   11/23/2020 >60 >60 mL/min/1.73m2 Final   09/27/2019 44 (L) >60 mL/min/1.73m2 Final   08/06/2019 59 (L) >60 mL/min/[1.73_m2] Final     Comment:     Non  GFR Calc  Starting 12/18/2018, serum creatinine based estimated GFR (eGFR) will be   calculated using the Chronic Kidney Disease Epidemiology Collaboration   (CKD-EPI) equation.     08/05/2019 50 (L) >60 mL/min/[1.73_m2] Final     Comment:     Non  GFR Calc  Starting 12/18/2018, serum creatinine based estimated GFR (eGFR) will be   calculated using the Chronic Kidney Disease Epidemiology Collaboration   (CKD-EPI) equation.     07/23/2019 48 (L) >60 mL/min/[1.73_m2] Final     Comment:     Non  GFR Calc  Starting 12/18/2018, serum creatinine based estimated GFR (eGFR) will be   calculated using the Chronic Kidney Disease Epidemiology Collaboration   (CKD-EPI) equation.       GFR Estimate If Black   Date Value Ref Range Status   06/17/2021 57 (L) >60 mL/min/1.73m2 Final   11/23/2020 >60 >60 mL/min/1.73m2 Final   09/27/2019 53 (L) >60 mL/min/1.73m2 Final   08/06/2019 69 >60 mL/min/[1.73_m2] Final     Comment:      GFR Calc  Starting 12/18/2018, serum creatinine based estimated GFR (eGFR) will be   calculated using the Chronic Kidney Disease Epidemiology Collaboration   (CKD-EPI) equation.     08/05/2019 58 (L) >60 mL/min/[1.73_m2] Final     Comment:      GFR Calc  Starting 12/18/2018, serum creatinine based estimated GFR (eGFR) will be   calculated using the Chronic Kidney Disease Epidemiology Collaboration   (CKD-EPI) equation.     07/23/2019 56 (L) >60 mL/min/[1.73_m2] Final     Comment:      GFR Calc  Starting  12/18/2018, serum creatinine based estimated GFR (eGFR) will be   calculated using the Chronic Kidney Disease Epidemiology Collaboration   (CKD-EPI) equation.         Lab Results   Component Value Date    MICROL <5 10/12/2015     No results found for: MICROALBUMIN  No results found for: CPEPT, GADAB, ISCAB    Vitamin B12   Date Value Ref Range Status   11/27/2020 340 213 - 816 pg/mL Final   08/31/2020 260 213 - 816 pg/mL Final   08/05/2013 506 >210 pg/mL Final     Comment:     Interp: 247-911 = Normal   ]    Most recent eye exam date: : Not Found       Care Everywhere Results:   A1c: 6.9% 3/18/20  TSH: 1.04 1/7/20  GFR 53  9/27/20  Creatinine: 1.22 9/27/20      Assessment/Plan:      1.  Type 2 diabetes-  Ms. Patton glucose is running a bit higher since we stopped jardiance and lowered Victoza to 1.2 mg daily.   A1c is up to 7.8%.  Will increase Toujeo to 70 units daily.  Nurse will let me know if she has hypoglycemia.  Glad she is feeling better.      2.  Risk factors- BP is well controlled.  She does have microalbuminuria (54.1 11/23/20).  She is on gabapentin and cymbalta for neuropathy. GFR is stable.  TSH normal in January, 2020. LDL 30.     3. F/U in 3 months with me, sooner with concerns.        39  minutes spent on the date of the encounter doing chart review, review of test results, review and interpretation of glucose data, patient visit and documentation, counseling/coordination of care, and discussion of follow up plan for worsening hyper and hypoglycemia.  The patient understood and is satisfied with today's visit.     Renetta Washington PA-C, MPAS   [facility]  Department of Medicine  Division of Endocrinology and Diabetes

## 2022-03-21 NOTE — NURSING NOTE
"Chief Complaint   Patient presents with     Diabetes     Vital signs:      BP: 122/41                  Estimated body mass index is 27.63 kg/m  as calculated from the following:    Height as of 1/5/22: 1.6 m (5' 3\").    Weight as of 1/5/22: 70.8 kg (156 lb).          "

## 2022-03-21 NOTE — PROGRESS NOTES
HPI:   Ms. Preciado is a 72 yo woman here for follow up of type 2 diabetes.  At her last visit, she was complaining of a low appetite, nausea and not feeling well.  We decided to reduce the Victoza to 1.2 mg and stopped the jardiance (she had yeast infections). She says she is feeling much better and her appetite is good.   Her weight has stabilized.     Cristal's current diabetes treatment includes:   Victoza 1.8 mg daily (several years ago)  Metformin 1000 mg daily (added in )  Toujeo 60 units daily (switched from U-500 in )   Her insulin requirements have come down significantly.       Previous treatments:   Jardiance 10 mg daily (started )- stopped in  due to yeast infections.     Recent glucose is as follows:   7:30am- 115-270  11:30am- 194-399  4:30pm-   8:00pm- 133-428    Rosalva (RN) reports Cristal recently bought a lot of candy and has been snacking on this frequently.     TSH: 1.05- 3/8/21  ALT: 35  A1c: 6.5%- 21  Microalbumin: 21- 4.99      Her wheelchair is uncomfortable. No other concerns today.     PMH   Type 2 diabetes  Neuropathy   Nephropathy  Stroke - mainly in wheelchair. Would like to start to walk again.    CAD, s/p stent   HTN  Dyslipidemia  Cataracts  Glaucoma  GERD  Pressure sore on bottom.     Family Hx:   Mom- stroke  Dad- cancer  1 of 12 children  2 brothers and 2 sisters-  cancer- unsure of type  1 sister with diabetes, on insulin  5 children  Son- MS, milder  Daughter, April- MS  Other kids- healthy  6 grandchildren    Social Hx:   Ms. Preciado lives at [facility].  She is seeing her family about 1-2 times a month now. Has 5 children, all now living in [facility] cities. Daughter has MS and is in a nursing home.  6 grandchildren.  Originally from [facility].     Current Medications  Current Outpatient Medications   Medication Sig Dispense Refill     acetaminophen (TYLENOL) 500 MG tablet Take 1,000 mg by mouth 3 times daily        ASPIRIN LOW DOSE 81 MG chewable tablet CHEW AND SWALLOW 1 TAB BY MOUTH ONCE DAILY IN THE MORNING  99     atorvastatin (LIPITOR) 80 MG tablet Take 1 tablet by mouth daily. Pt needs to have labs done prior to further refills. 90 tablet 0     benzocaine-menthol (CEPACOL) 15-3.6 MG lozenge Place 1 lozenge inside cheek every 2 hours as needed for moderate pain       bisacodyl (DULCOLAX) 10 MG suppository Place 10 mg rectally daily as needed for constipation       carboxymethylcellulose (REFRESH PLUS) 0.5 % SOLN 1 drop 3 times daily as needed.       CHLORTHALIDONE PO Take 25 mg by mouth every morning        dorzolamide-timolol 2-0.5 % OP ophthalmic solution Place 1 drop into both eyes 2 times daily 1 Bottle 11     DULoxetine HCl (CYMBALTA PO) Take 90 mg by mouth every morning        folic acid (FOLVITE) 1 MG tablet Take 1 mg by mouth every morning        gabapentin (NEURONTIN) 300 MG capsule Take 300 mg by mouth 3 times daily       insulin glargine U-300 (TOUJEO) 300 UNIT/ML (1 units dial) pen Inject 55 Units Subcutaneous At Bedtime       ketoconazole (NIZORAL) 2 % shampoo To entire wet scalp and ears and then wash off after 5 minutes three times a week. 240 mL 11     latanoprost (XALATAN) 0.005 % ophthalmic solution Place 1 drop into both eyes daily       liraglutide (VICTOZA PEN) 18 MG/3ML solution Inject 1.2 mg Subcutaneous daily       loperamide (IMODIUM A-D) 2 MG tablet Take 2 mg by mouth 4 times daily as needed for diarrhea       loratadine (CLARITIN) 10 MG tablet Take 10 mg by mouth as needed.       metFORMIN (GLUCOPHAGE-XR) 500 MG 24 hr tablet Take 2 tablets (1,000 mg) by mouth daily (with dinner) 180 tablet 3     metoprolol tartrate (LOPRESSOR) 100 MG tablet Take 100 mg by mouth 2 times daily       nitroGLYCERIN (NITROSTAT) 0.4 MG SL tablet Place 0.4 mg under the tongue every 5 minutes as needed.       olopatadine HCl (PATADAY) 0.2 % SOLN Place 1 drop into both eyes daily as needed For itchy eyes 1 Bottle 5      omeprazole (PRILOSEC) 20 MG DR capsule Take 20 mg by mouth       pregabalin (LYRICA) 75 MG capsule Take 1 capsule (75 mg) by mouth 3 times daily 90 capsule 3     senna-docusate (SENOKOT-S/PERICOLACE) 8.6-50 MG tablet Take 2 tablets by mouth 2 times daily as needed for constipation 60 tablet 0     simethicone (MYLICON) 80 MG chewable tablet Take 80 mg by mouth 4 times daily       tetrahydrozoline (VISINE) 0.05 % ophthalmic solution Place 2 drops into both eyes 4 times daily       Past Medical History:   Diagnosis Date     AION (anterior ischaemic optic neuropathy), left eye     NAION LE     CAD (coronary artery disease) 3/2009    [facility]; Angio  [facility]- normal coronary arteries     Cataract      CVA (cerebral vascular accident) (H)     admitted at [facility]     on Cymbalta     Diabetes mellitus, type 2 (H)      Diabetic nephropathy (H)      Diabetic neuropathy (H)     severe     Diabetic retinopathy (H)      GERD (gastroesophageal reflux disease)      Hyperlipidemia      Hypertension     ECHO , TDS, NL EF     POAG (primary open-angle glaucoma)     adv BE     Seasonal allergies      Tubular adenoma of colon     repeat colonoscopy in        Past Surgical History:   Procedure Laterality Date     CARDIAC CATHETERIZATION       CATARACT EXTRACTION W/ INTRAOCULAR LENS IMPLANT Bilateral       SECTION        SECTION       COLONOSCOPY  7/15/2013    Tubular adenoma; repeat in 2018;Procedure: COMBINED COLONOSCOPY, SINGLE BIOPSY/POLYPECTOMY BY BIOPSY;;  Surgeon: Don King MD;  Tubular adenoma     COLONOSCOPY N/A 2020    Procedure: COLONOSCOPY;  Surgeon: Sid Amadna MD;  Location:  GI     CORONARY STENT PLACEMENT  2004    RCA     DAVINCI HYSTERECTOMY TOTAL, BILATERAL SALPINGO-OOPHORECTOMY, COMBINED N/A 2019    Procedure: DaVinci Assisted Total Laparoscopic Hysterectomy, Removal Of Both Tubes And Ovaries;  Surgeon: Linh Cardoso,  MD;  Location: [facility] OR     EXTRACAPSULAR CATARACT EXTRATION WITH INTRAOCULAR LENS IMPLANT  11-10-09, 2-9-10    11-10-09 Lt, 2-9-10 Rt; Left eye 2012     HYSTERECTOMY TOTAL ABDOMINAL, BILATERAL SALPINGO-OOPHORECTOMY, COMBINED  2019    Procedure: DaVinci Assisted Total Laparoscopic Hysterectomy, Removal Of Both Tubes And Ovaries; Surgeon: Linh Cardoso MD; Location: [facility] OR       STENT, CORONARY, DELORES  2009    RCA       Family History   Problem Relation Age of Onset     Hypertension Mother      Cerebrovascular Disease Mother      Glaucoma Father      Cancer Father      Diabetes Sister      Glaucoma Sister      Cancer - colorectal Other      Cerebrovascular Disease Other      Skin Cancer No family hx of      Melanoma No family hx of      Anesthesia Reaction No family hx of      Deep Vein Thrombosis (DVT) No family hx of      Arthritis Brother      Diabetes Sister      Glaucoma Sister        Social History     Socioeconomic History     Marital status: Single     Spouse name: Not on file     Number of children: Not on file     Years of education: Not on file     Highest education level: Not on file   Occupational History     Not on file   Social Needs     Financial resource strain: Not on file     Food insecurity:     Worry: Not on file     Inability: Not on file     Transportation needs:     Medical: Not on file     Non-medical: Not on file   Tobacco Use     Smoking status: Former Smoker     Packs/day: 1.50     Years: 45.00     Pack years: 67.50     Types: Cigarettes     Start date: 1968     Last attempt to quit: 3/6/2013     Years since quittin.4     Smokeless tobacco: Never Used   Substance and Sexual Activity     Alcohol use: Not Currently     Drug use: Never     Sexual activity: Not Currently   Lifestyle     Physical activity:     Days per week: Not on file     Minutes per session: Not on file     Stress: Not on file   Relationships     Social connections:     Talks on phone: Not on file     Gets  together: Not on file     Attends Restorationist service: Not on file     Active member of club or organization: Not on file     Attends meetings of clubs or organizations: Not on file     Relationship status: Not on file     Intimate partner violence:     Fear of current or ex partner: Not on file     Emotionally abused: Not on file     Physically abused: Not on file     Forced sexual activity: Not on file   Other Topics Concern     Parent/sibling w/ CABG, MI or angioplasty before 65F 55M? Not Asked   Social History Narrative    Pt has three daughters and two sons, single.  Her daughter Court, see's her often at the Nursing Home ([facility]).  Moved into Nursing Home in October 2011 after a hospitalization for ALY.  Moved from [place] in 2002 to [facility]. Has 5 grandchildren.       Physical Exam  There were no vitals taken for this visit.  GENERAL:  Alert and oriented X3, NAD, well dressed, answering questions appropriately, appears stated age.  HEENT: OP clear, no lymphadenopathy, no thyromegaly, non-tender, no exophthalmus, no proptosis, EOMI, no lid lag, no retraction  EXTREMITIES: no edema, +pulses, no rashes, no lesions      RESULTS  Lab Results   Component Value Date    A1C 8.1 (H) 06/17/2021    A1C 6.5 (H) 11/27/2020    A1C 6.9 (H) 03/18/2020    A1C 6.6 (H) 07/23/2019    A1C 7.0 (H) 01/10/2019    A1C 8.3 (H) 09/07/2018    A1C 7.5 (H) 10/24/2016    A1C 7.5 (H) 10/12/2015    A1C 8.1 (H) 03/06/2014    A1C 7.2 (H) 03/07/2013    HEMOGLOBINA1 6.4 11/15/2021    HEMOGLOBINA1 6.6 (A) 01/06/2020    HEMOGLOBINA1 6.5 (A) 05/24/2019    HEMOGLOBINA1 7.1 (A) 10/10/2018    HEMOGLOBINA1 7.1 (A) 03/16/2018       TSH   Date Value Ref Range Status   03/08/2021 1.05 0.30 - 5.00 uIU/mL Final   01/07/2020 1.04 0.30 - 5.00 uIU/mL Final   08/27/2019 1.32 0.30 - 5.00 uIU/mL Final   10/24/2016 1.20 0.40 - 4.00 mU/L Final   10/12/2015 1.18 0.40 - 4.00 mU/L Final   08/21/2014 1.24 0.40 - 4.00 mU/L Final     Comment:     Effective  7/30/2014, the reference range for this assay has changed to reflect   new instrumentation/methodology.     08/05/2013 1.12 0.4 - 5.0 mU/L Final   07/15/2010 0.53 0.4 - 5.0 mU/L Final     T4 Total   Date Value Ref Range Status   10/30/2008 10.6 5.0 - 11.0 ug/dL Final     T4 Free   Date Value Ref Range Status   04/21/2006 1.04 0.70 - 1.85 ng/dL Final   09/23/2005 1.58 0.70 - 1.85 ng/dL Final       ALT   Date Value Ref Range Status   03/08/2021 35 0 - 45 U/L Final   11/27/2020 25 0 - 45 U/L Final   10/12/2015 39 0 - 50 U/L Final   03/06/2014 36 0 - 50 U/L Final   ]    Recent Labs   Lab Test 11/18/21  0544 11/27/20  0456 04/15/15  0620 08/21/14  0935   CHOL 92 103   < > 110   HDL 26* 32*   < > 43*   LDL 30 43   < > 43   TRIG 180* 138   < > 122   CHOLHDLRATIO  --   --   --  2.6    < > = values in this interval not displayed.       Lab Results   Component Value Date     10/28/2021     08/06/2019      Lab Results   Component Value Date    POTASSIUM 3.7 10/28/2021    POTASSIUM 4.8 08/06/2019     Lab Results   Component Value Date    CHLORIDE 103 10/28/2021    CHLORIDE 104 08/06/2019     Lab Results   Component Value Date    OLAF 10.4 10/28/2021    OLAF 8.8 08/06/2019     Lab Results   Component Value Date    CO2 22 10/28/2021    CO2 19 08/06/2019     Lab Results   Component Value Date    BUN 33 10/28/2021    BUN 24 08/06/2019     Lab Results   Component Value Date    CR 1.03 10/28/2021    CR 0.98 08/06/2019       GFR Estimate   Date Value Ref Range Status   10/28/2021 56 (L) >60 mL/min/1.73m2 Final     Comment:     As of July 11, 2021, eGFR is calculated by the CKD-EPI creatinine equation, without race adjustment. eGFR can be influenced by muscle mass, exercise, and diet. The reported eGFR is an estimation only and is only applicable if the renal function is stable.   06/17/2021 47 (L) >60 mL/min/1.73m2 Final   11/23/2020 >60 >60 mL/min/1.73m2 Final   09/27/2019 44 (L) >60 mL/min/1.73m2 Final   08/06/2019 59 (L)  >60 mL/min/[1.73_m2] Final     Comment:     Non  GFR Calc  Starting 12/18/2018, serum creatinine based estimated GFR (eGFR) will be   calculated using the Chronic Kidney Disease Epidemiology Collaboration   (CKD-EPI) equation.     08/05/2019 50 (L) >60 mL/min/[1.73_m2] Final     Comment:     Non  GFR Calc  Starting 12/18/2018, serum creatinine based estimated GFR (eGFR) will be   calculated using the Chronic Kidney Disease Epidemiology Collaboration   (CKD-EPI) equation.     07/23/2019 48 (L) >60 mL/min/[1.73_m2] Final     Comment:     Non  GFR Calc  Starting 12/18/2018, serum creatinine based estimated GFR (eGFR) will be   calculated using the Chronic Kidney Disease Epidemiology Collaboration   (CKD-EPI) equation.       GFR Estimate If Black   Date Value Ref Range Status   06/17/2021 57 (L) >60 mL/min/1.73m2 Final   11/23/2020 >60 >60 mL/min/1.73m2 Final   09/27/2019 53 (L) >60 mL/min/1.73m2 Final   08/06/2019 69 >60 mL/min/[1.73_m2] Final     Comment:      GFR Calc  Starting 12/18/2018, serum creatinine based estimated GFR (eGFR) will be   calculated using the Chronic Kidney Disease Epidemiology Collaboration   (CKD-EPI) equation.     08/05/2019 58 (L) >60 mL/min/[1.73_m2] Final     Comment:      GFR Calc  Starting 12/18/2018, serum creatinine based estimated GFR (eGFR) will be   calculated using the Chronic Kidney Disease Epidemiology Collaboration   (CKD-EPI) equation.     07/23/2019 56 (L) >60 mL/min/[1.73_m2] Final     Comment:      GFR Calc  Starting 12/18/2018, serum creatinine based estimated GFR (eGFR) will be   calculated using the Chronic Kidney Disease Epidemiology Collaboration   (CKD-EPI) equation.         Lab Results   Component Value Date    MICROL <5 10/12/2015     No results found for: MICROALBUMIN  No results found for: CPEPT, GADAB, ISCAB    Vitamin B12   Date Value Ref Range Status   11/27/2020 340  213 - 816 pg/mL Final   08/31/2020 260 213 - 816 pg/mL Final   08/05/2013 506 >210 pg/mL Final     Comment:     Interp: 247-911 = Normal   ]    Most recent eye exam date: : Not Found       Care Everywhere Results:   A1c: 6.9% 3/18/20  TSH: 1.04 1/7/20  GFR 53  9/27/20  Creatinine: 1.22 9/27/20      Assessment/Plan:      1.  Type 2 diabetes-  Ms. Patton glucose is running a bit higher since we stopped jardiance and lowered Victoza to 1.2 mg daily.   A1c is up to 7.8%.  Will increase Toujeo to 70 units daily.  Nurse will let me know if she has hypoglycemia.  Glad she is feeling better.      2.  Risk factors- BP is well controlled.  She does have microalbuminuria (54.1 11/23/20).  She is on gabapentin and cymbalta for neuropathy. GFR is stable.  TSH normal in January, 2020. LDL 30.     3. F/U in 3 months with me, sooner with concerns.        39  minutes spent on the date of the encounter doing chart review, review of test results, review and interpretation of glucose data, patient visit and documentation, counseling/coordination of care, and discussion of follow up plan for worsening hyper and hypoglycemia.  The patient understood and is satisfied with today's visit.     Renetta Washington PA-C, MPAS   [facility]  Department of Medicine  Division of Endocrinology and Diabetes

## 2022-03-28 ENCOUNTER — NURSING HOME VISIT (OUTPATIENT)
Dept: GERIATRICS | Facility: CLINIC | Age: 72
End: 2022-03-28
Payer: COMMERCIAL

## 2022-03-28 ENCOUNTER — MEDICAL CORRESPONDENCE (OUTPATIENT)
Dept: HEALTH INFORMATION MANAGEMENT | Facility: CLINIC | Age: 72
End: 2022-03-28

## 2022-03-28 DIAGNOSIS — E11.69 TYPE 2 DIABETES MELLITUS WITH OTHER SPECIFIED COMPLICATION, WITH LONG-TERM CURRENT USE OF INSULIN (H): Primary | ICD-10-CM

## 2022-03-28 DIAGNOSIS — G62.9 NEUROPATHY: ICD-10-CM

## 2022-03-28 DIAGNOSIS — Z86.73 HISTORY OF STROKE: ICD-10-CM

## 2022-03-28 DIAGNOSIS — I10 ESSENTIAL HYPERTENSION: ICD-10-CM

## 2022-03-28 DIAGNOSIS — Z79.4 TYPE 2 DIABETES MELLITUS WITH OTHER SPECIFIED COMPLICATION, WITH LONG-TERM CURRENT USE OF INSULIN (H): Primary | ICD-10-CM

## 2022-03-28 PROCEDURE — 99309 SBSQ NF CARE MODERATE MDM 30: CPT | Performed by: FAMILY MEDICINE

## 2022-03-28 NOTE — LETTER
3/28/2022        RE: Cristal Preciado  [facility]  [address]  [facility] [address]                  [facility] GERIATRIC SERVICES    [facility] Record Number:  [id number]  Place of Service where encounter took place: [facility] () [[id number]]   CODE STATUS:   CPR/Full code     Chief Complaint:  Chief Complaint   Patient presents with     [facility]     LT 3/28/2022. DM, HTN, neuropathy, chronic pain.       HPI:   Cristal is a 72 y.o. female seen for routine physician follow up in [facility] at [facility]. She has been a resident here since October 2011. She does have multiple complex co morbidities. She is treated for hypertension and has insulin-dependent diabetes mellitus. She sees an endocrinologist. She has chronic neuropathy, chronic kidney disease and is treated with Cymbalta for depression and chronic pain. She is on gabapentin and lyrica for neuropathy pain. She has chronic weakness in her lower extremities and is wheelchair bound. She has chronic urinary incontinence. Hospitalized in April 2018 for chest pain. It is noted she has coronary artery disease having had PCI with stent placement in 2009. Her chest pain was reproducible on exam. Negative 3 sets of troponin. EKG showed no significant ischemic changes. Pharmacological stress was negative for inducible ischemia so no intervention was required. She has periodic follow up with cardiology. She was admitted to the [facility] on 8/5/19 for planned hysterectomy. She had been experiencing brownish vaginal discharge, referred to gynecology with endometrial biopsy showing atypia. Her hospital course was uneventful, she underwent davinci assisted total laparoscopic hysterectomy with bilateral salping oophorectomy. She returned to [facility] on 8/6/19.      Today:  Her blood sugars have been more elevated lately. Her endocrinologist will be updated. She is on metformin, toujeo and victoza.  Accuchecks followed. She has not been ill recently with cough cold or congestion. Has been prone to open areas buttocks, right thigh and gluteal fold, encouraging off loading and using foam bandage. She is wheelchair bound, does not ambulate. Requires maximum assistance from LTC staff. She needs assist with cares due to neuropathy and difficulty using her hands. BPs satisfactory, treated with metoprolol and chlorthalidone for HTN. She is on Cymbalta for mood and also chronic pain along with lyrica. Appetite stable, followed by dietician.      Past Medical History:  Past Medical History:   Diagnosis Date     AION (anterior ischaemic optic neuropathy), left eye     NAION LE     CAD (coronary artery disease) 3/2009    [facility]; Angio 2013 [facility]- normal coronary arteries     Cataract      CVA (cerebral vascular accident) (H)     admitted at [facility]     on Cymbalta     Diabetes mellitus, type 2 (H)      Diabetic nephropathy (H)      Diabetic neuropathy (H)     severe     Diabetic retinopathy (H)      GERD (gastroesophageal reflux disease)      Hyperlipidemia      Hypertension     ECHO 2013, TDS, NL EF     POAG (primary open-angle glaucoma)     adv BE     Seasonal allergies      Tubular adenoma of colon 2013    repeat colonoscopy in 2018       Medications:  Current Outpatient Medications   Medication Instructions     acetaminophen (TYLENOL) 1,000 mg, Oral, 3 times daily     aspirin 81 mg, DAILY     atorvastatin (LIPITOR) 80 mg, Bedtime     benzocaine-menthoL (CEPACOL) 15-3.6 mg 1 lozenge, Oral, Every 2 hours PRN     bisacodyL (DULCOLAX) 10 mg, Rectal, Daily PRN     carboxymethylcellulose (REFRESH PLUS) 0.5 % Dpet ophthalmic dropperette 1 drop, Both Eyes, 3 times daily     chlorthalidone (HYGROTEN) 25 mg, Oral, DAILY     dorzolamide-timolol (COSOPT) 22.3-6.8 mg/mL ophthalmic solution 1 drop, 2 times daily     DULoxetine (CYMBALTA) 90 mg, Oral, DAILY     folic acid (FOLVITE) 1 mg, DAILY      gabapentin (NEURONTIN) 300 mg, Oral, 3 times daily   * insulin glargine U-300 conc (TOUJEO MAX U-300 SOLOSTAR) 55 units daily     ketoconazole (NIZORAL) 2 % shampoo 1 application, Topical, 2 times weekly, Apply to damp skin, lather, leave on 5 minutes, and rinse. Apply on Wednesdays and Saturdays.     latanoprost (XALATAN) 0.005 % ophthalmic solution 1 drop, Bedtime     liraglutide (VICTOZA) 1.2 mg, DAILY     loratadine (CLARITIN) 10 mg, Daily PRN     metFORMIN (GLUCOPHAGE-XR) 1,000 mg, Oral, QDaily     metoprolol tartrate (LOPRESSOR) 100 mg, Oral, 2 times daily     nitroglycerin (NITROSTAT) 0.4 mg, Every 5 min PRN     olopatadine 0.2 % Drop 1 drop, Ophthalmic, Daily PRN     omeprazole (PRILOSEC) 20 mg, Oral, every morning     pregabalin (LYRICA) 75 mg, Oral, 3 times daily     senna-docusate (SENNOSIDES-DOCUSATE SODIUM) 8.6-50 mg tablet 2 tablets, Oral, 2 times daily PRN     simethicone (MYLICON) 80 mg, Oral, 4 times daily, After meals and at HS      triamcinolone (KENALOG) 0.1 % ointment 1 application, Topical, DAILY, Apply to legs in the morning  And to right ear two times a day prn       Physical Exam:   General: Patient is alert female, no distress.  Vitals: /69, Temp 98.2, HR 61, RR 18.    HEENT: Head is NCAT. Eyes show no injection or icterus. Nares negative. Oropharynx well hydrated.  CV: Non labored respirations.   : Deferred.  Extremities: Mild LE edema is noted.  Musculoskeletal: Deformities small joints hands, somewhat puffy.  Psych: Mood appears good.      Labs:  Lab Results   Component Value Date    WBC 4.2 02/22/2021    HGB 10.5 (L) 02/22/2021    HCT 33.1 (L) 02/22/2021    MCV 97 02/22/2021     02/22/2021     Results for orders placed or performed in visit on 11/23/20   Basic Metabolic Panel   Result Value Ref Range    Sodium 137 136 - 145 mmol/L    Potassium 4.1 3.5 - 5.0 mmol/L    Chloride 103 98 - 107 mmol/L    CO2 23 22 - 31 mmol/L    Anion Gap, Calculation 11 5 - 18 mmol/L     Glucose 171 (H) 70 - 125 mg/dL    Calcium 10.0 8.5 - 10.5 mg/dL    BUN 17 8 - 22 mg/dL    Creatinine 0.91 0.60 - 1.10 mg/dL    GFR MDRD Af Amer >60 >60 mL/min/1.73m2    GFR MDRD Non Af Amer >60 >60 mL/min/1.73m2     Lab Results   Component Value Date    TSH 1.05 03/08/2021     Component      Latest Ref Rng & Units 1/6/2020 11/15/2021 3/21/2022               Hemoglobin A1C POCT      4.3 - <5.7 % 6.6 (A) 6.4 7.8         Assessment/Plan:  1. IDDM. She follows with endocrinology, to be updated regarding recent elevated sugars. Currently on insulin toujeo, victoza and metformin.  A1c went from 6.4% on 11/15/2021 to 7.8% on 3/21/2022.  2. HTN. She is treated with metoprolol and chlorthalidone. Overall BPs acceptable, continue.   3. Neuropathy. Chronic pain controlled with gabapentin, lyrica and Cymbalta.  4. Hx of stroke. Wheelchair bound, non ambulatory. On aspirin, statin.  5. Glaucoma. Visual impairment at baseline.  6. Depression. Continue Cymbalta.  7. CKD. Labs as noted above.          Electronically signed by: Meagan Valdez MD             Sincerely,        Meagan Valdez MD

## 2022-04-01 ENCOUNTER — MEDICAL CORRESPONDENCE (OUTPATIENT)
Dept: HEALTH INFORMATION MANAGEMENT | Facility: CLINIC | Age: 72
End: 2022-04-01
Payer: COMMERCIAL

## 2022-04-06 ENCOUNTER — TELEPHONE (OUTPATIENT)
Dept: GERIATRICS | Facility: CLINIC | Age: 72
End: 2022-04-06
Payer: COMMERCIAL

## 2022-04-06 DIAGNOSIS — E11.42 DIABETIC POLYNEUROPATHY ASSOCIATED WITH TYPE 2 DIABETES MELLITUS (H): ICD-10-CM

## 2022-04-06 RX ORDER — PREGABALIN 75 MG/1
75 CAPSULE ORAL 3 TIMES DAILY
Qty: 90 CAPSULE | Refills: 3 | Status: SHIPPED | OUTPATIENT
Start: 2022-04-06 | End: 2022-01-01

## 2022-04-06 RX ORDER — SIMETHICONE 125 MG
125 TABLET,CHEWABLE ORAL 4 TIMES DAILY PRN
Status: ON HOLD | COMMUNITY
End: 2023-01-01

## 2022-04-06 NOTE — TELEPHONE ENCOUNTER
[facility] Triage Nurse Telephone Encounter    Provider: DANYELLE Wesley  Facility: [facility] Facility Type:  LTC    Caller: Rosalva   Call Back Number: [phone]    Allergies:    Allergies   Allergen Reactions     Dust Mites Other (See Comments)     Sneezing runny eyes and nose.     Food Allergy Formula Hives     Mountain Dew and Walnuts     Pollen Extract Other (See Comments)     Sneezing runny eyes and nose.        Reason for call: Pt has simethicone 80mg tid after meals and at bedtime and the pharmacy no longer has access to the 80mg tablet, it also come in 125mg tablet. Nursing is wondering if we can change it to prn as the pt doesn't report of any gas discomfort.     Verbal Order/Direction given by Provider: Discontinue Simethicone 80mg  Simethicone 125mg 1 tab as needed after meals and prn HS     Provider giving Order:  DANYELLE Wesley    Verbal Order given to: Bernard Watson RN

## 2022-04-07 ENCOUNTER — TELEPHONE (OUTPATIENT)
Dept: OPHTHALMOLOGY | Facility: CLINIC | Age: 72
End: 2022-04-07
Payer: COMMERCIAL

## 2022-04-10 ENCOUNTER — MEDICAL CORRESPONDENCE (OUTPATIENT)
Dept: HEALTH INFORMATION MANAGEMENT | Facility: CLINIC | Age: 72
End: 2022-04-10
Payer: COMMERCIAL

## 2022-04-11 ENCOUNTER — MEDICAL CORRESPONDENCE (OUTPATIENT)
Dept: HEALTH INFORMATION MANAGEMENT | Facility: CLINIC | Age: 72
End: 2022-04-11
Payer: COMMERCIAL

## 2022-04-11 NOTE — PROGRESS NOTES
[facility] SERVICES    [place] Medical Record Number:  [id number]  Place of Service where encounter took place: [facility] () [[id number]]   CODE STATUS:   CPR/Full code     Chief Complaint:  Chief Complaint   Patient presents with     assisted Regulatory     LTC 3/28/2022. DM, HTN, neuropathy, chronic pain.       HPI:   Cristal is a 72 y.o. female seen for routine physician follow up in LT at [facility]. She has been a resident here since October 2011. She does have multiple complex co morbidities. She is treated for hypertension and has insulin-dependent diabetes mellitus. She sees an endocrinologist. She has chronic neuropathy, chronic kidney disease and is treated with Cymbalta for depression and chronic pain. She is on gabapentin and lyrica for neuropathy pain. She has chronic weakness in her lower extremities and is wheelchair bound. She has chronic urinary incontinence. Hospitalized in April 2018 for chest pain. It is noted she has coronary artery disease having had PCI with stent placement in 2009. Her chest pain was reproducible on exam. Negative 3 sets of troponin. EKG showed no significant ischemic changes. Pharmacological stress was negative for inducible ischemia so no intervention was required. She has periodic follow up with cardiology. She was admitted to the [facility] on 8/5/19 for planned hysterectomy. She had been experiencing brownish vaginal discharge, referred to gynecology with endometrial biopsy showing atypia. Her hospital course was uneventful, she underwent davinci assisted total laparoscopic hysterectomy with bilateral salping oophorectomy. She returned to LT on 8/6/19.      Today:  Her blood sugars have been more elevated lately. Her endocrinologist will be updated. She is on metformin, toujeo and victoza. Accuchecks followed. She has not been ill recently with cough cold or congestion. Has been prone to open areas buttocks,  right thigh and gluteal fold, encouraging off loading and using foam bandage. She is wheelchair bound, does not ambulate. Requires maximum assistance from LTC staff. She needs assist with cares due to neuropathy and difficulty using her hands. BPs satisfactory, treated with metoprolol and chlorthalidone for HTN. She is on Cymbalta for mood and also chronic pain along with lyrica. Appetite stable, followed by dietician.      Past Medical History:  Past Medical History:   Diagnosis Date     AION (anterior ischaemic optic neuropathy), left eye     NAION LE     CAD (coronary artery disease) 3/2009    [facility]; Angio 2013 UM- normal coronary arteries     Cataract      CVA (cerebral vascular accident) (H)     admitted at [facility]     on Cymbalta     Diabetes mellitus, type 2 (H)      Diabetic nephropathy (H)      Diabetic neuropathy (H)     severe     Diabetic retinopathy (H)      GERD (gastroesophageal reflux disease)      Hyperlipidemia      Hypertension     ECHO 2013, TDS, NL EF     POAG (primary open-angle glaucoma)     adv BE     Seasonal allergies      Tubular adenoma of colon 2013    repeat colonoscopy in 2018       Medications:  Current Outpatient Medications   Medication Instructions     acetaminophen (TYLENOL) 1,000 mg, Oral, 3 times daily     aspirin 81 mg, DAILY     atorvastatin (LIPITOR) 80 mg, Bedtime     benzocaine-menthoL (CEPACOL) 15-3.6 mg 1 lozenge, Oral, Every 2 hours PRN     bisacodyL (DULCOLAX) 10 mg, Rectal, Daily PRN     carboxymethylcellulose (REFRESH PLUS) 0.5 % Dpet ophthalmic dropperette 1 drop, Both Eyes, 3 times daily     chlorthalidone (HYGROTEN) 25 mg, Oral, DAILY     dorzolamide-timolol (COSOPT) 22.3-6.8 mg/mL ophthalmic solution 1 drop, 2 times daily     DULoxetine (CYMBALTA) 90 mg, Oral, DAILY     folic acid (FOLVITE) 1 mg, DAILY     gabapentin (NEURONTIN) 300 mg, Oral, 3 times daily   * insulin glargine U-300 conc (TOUJEO MAX U-300 SOLOSTAR) 55 units daily      ketoconazole (NIZORAL) 2 % shampoo 1 application, Topical, 2 times weekly, Apply to damp skin, lather, leave on 5 minutes, and rinse. Apply on Wednesdays and Saturdays.     latanoprost (XALATAN) 0.005 % ophthalmic solution 1 drop, Bedtime     liraglutide (VICTOZA) 1.2 mg, DAILY     loratadine (CLARITIN) 10 mg, Daily PRN     metFORMIN (GLUCOPHAGE-XR) 1,000 mg, Oral, QDaily     metoprolol tartrate (LOPRESSOR) 100 mg, Oral, 2 times daily     nitroglycerin (NITROSTAT) 0.4 mg, Every 5 min PRN     olopatadine 0.2 % Drop 1 drop, Ophthalmic, Daily PRN     omeprazole (PRILOSEC) 20 mg, Oral, every morning     pregabalin (LYRICA) 75 mg, Oral, 3 times daily     senna-docusate (SENNOSIDES-DOCUSATE SODIUM) 8.6-50 mg tablet 2 tablets, Oral, 2 times daily PRN     simethicone (MYLICON) 80 mg, Oral, 4 times daily, After meals and at HS      triamcinolone (KENALOG) 0.1 % ointment 1 application, Topical, DAILY, Apply to legs in the morning  And to right ear two times a day prn       Physical Exam:   General: Patient is alert female, no distress.  Vitals: /69, Temp 98.2, HR 61, RR 18.    HEENT: Head is NCAT. Eyes show no injection or icterus. Nares negative. Oropharynx well hydrated.  CV: Non labored respirations.   : Deferred.  Extremities: Mild LE edema is noted.  Musculoskeletal: Deformities small joints hands, somewhat puffy.  Psych: Mood appears good.      Labs:  Lab Results   Component Value Date    WBC 4.2 02/22/2021    HGB 10.5 (L) 02/22/2021    HCT 33.1 (L) 02/22/2021    MCV 97 02/22/2021     02/22/2021     Results for orders placed or performed in visit on 11/23/20   Basic Metabolic Panel   Result Value Ref Range    Sodium 137 136 - 145 mmol/L    Potassium 4.1 3.5 - 5.0 mmol/L    Chloride 103 98 - 107 mmol/L    CO2 23 22 - 31 mmol/L    Anion Gap, Calculation 11 5 - 18 mmol/L    Glucose 171 (H) 70 - 125 mg/dL    Calcium 10.0 8.5 - 10.5 mg/dL    BUN 17 8 - 22 mg/dL    Creatinine 0.91 0.60 - 1.10 mg/dL    GFR MDRD  Af Amer >60 >60 mL/min/1.73m2    GFR MDRD Non Af Amer >60 >60 mL/min/1.73m2     Lab Results   Component Value Date    TSH 1.05 03/08/2021     Component      Latest Ref Rng & Units 1/6/2020 11/15/2021 3/21/2022               Hemoglobin A1C POCT      4.3 - <5.7 % 6.6 (A) 6.4 7.8         Assessment/Plan:  1. IDDM. She follows with endocrinology, to be updated regarding recent elevated sugars. Currently on insulin toujeo, victoza and metformin.  A1c went from 6.4% on 11/15/2021 to 7.8% on 3/21/2022.  2. HTN. She is treated with metoprolol and chlorthalidone. Overall BPs acceptable, continue.   3. Neuropathy. Chronic pain controlled with gabapentin, lyrica and Cymbalta.  4. Hx of stroke. Wheelchair bound, non ambulatory. On aspirin, statin.  5. Glaucoma. Visual impairment at baseline.  6. Depression. Continue Cymbalta.  7. CKD. Labs as noted above.          Electronically signed by: Meagan Valdez MD

## 2022-04-16 ENCOUNTER — MEDICAL CORRESPONDENCE (OUTPATIENT)
Dept: HEALTH INFORMATION MANAGEMENT | Facility: CLINIC | Age: 72
End: 2022-04-16
Payer: COMMERCIAL

## 2022-04-19 ENCOUNTER — TELEPHONE (OUTPATIENT)
Dept: ENDOCRINOLOGY | Facility: CLINIC | Age: 72
End: 2022-04-19
Payer: COMMERCIAL

## 2022-04-19 DIAGNOSIS — Z79.4 TYPE 2 DIABETES MELLITUS WITH DIABETIC NEPHROPATHY, WITH LONG-TERM CURRENT USE OF INSULIN (H): Primary | ICD-10-CM

## 2022-04-19 DIAGNOSIS — E11.21 TYPE 2 DIABETES MELLITUS WITH DIABETIC NEPHROPATHY, WITH LONG-TERM CURRENT USE OF INSULIN (H): Primary | ICD-10-CM

## 2022-04-19 RX ORDER — LIRAGLUTIDE 6 MG/ML
1.8 INJECTION SUBCUTANEOUS DAILY
Qty: 9 ML | Refills: 11 | Status: SHIPPED | OUTPATIENT
Start: 2022-04-19 | End: 2022-01-01

## 2022-04-19 NOTE — TELEPHONE ENCOUNTER
Reviewed glucose of Cristal.  Running high.  She is eating more candy, etc, according to [facility].  Advised they increase Victoza to 1.8 mg daily.    HILL Moscoso

## 2022-04-27 ENCOUNTER — NURSING HOME VISIT (OUTPATIENT)
Dept: GERIATRICS | Facility: CLINIC | Age: 72
End: 2022-04-27
Payer: COMMERCIAL

## 2022-04-27 VITALS
WEIGHT: 155 LBS | HEART RATE: 63 BPM | RESPIRATION RATE: 18 BRPM | BODY MASS INDEX: 27.46 KG/M2 | TEMPERATURE: 98.3 F | HEIGHT: 63 IN | DIASTOLIC BLOOD PRESSURE: 76 MMHG | OXYGEN SATURATION: 100 % | SYSTOLIC BLOOD PRESSURE: 123 MMHG

## 2022-04-27 DIAGNOSIS — G62.9 NEUROPATHY: Primary | ICD-10-CM

## 2022-04-27 PROCEDURE — 99309 SBSQ NF CARE MODERATE MDM 30: CPT | Performed by: NURSE PRACTITIONER

## 2022-04-27 NOTE — LETTER
4/27/2022        RE: Cristal Preciado  [address] [place] [facility] [address]        [facility]  Chief Complaint   Patient presents with     [facility]PC Acute     [place] Medical Record Number:  [id number]  Place of Service where encounter took place:  [facility] () [[id number]]    HPI:    Cristal Preciado  is a 71 year old  (1950), who is being seen today for an annual comprehensive visit. She resides   in the long-term care facility [facility]. She has been a resident here since October 2011. She does have multiple complex co- morbidities. She is treated for hypertension and has insulin-dependent diabetes mellitus. She has a endocrinologist who follows her  blood sugars regularly.  She has chronic neuropathy, chronic kidney disease and is treated with Cymbalta for depression. She is on gabapentin and lyrica for neuropathy pain. She is on meds for depression.  Does require a lift for transfers. She has chronic weakness in her lower extremities.      Today she is seen for watery increased reports of neuropathy in her hands and foot.  Patient went out with family and brought in her own bottle of Tylenol arthritis.  It was explained to her by staff she is currently on extra strength Tylenol scheduled 3 times daily.  Her gabapentin was increased to 400 mg 3 times daily. She denies chest pain or shortness of breath.  She denies cough or congestion.  She is  non ambulatory, She is on  gabapentin and lyrica for neuropathy.. Her BS are being controlled by her endocrinologist and being sent in every couple of weeks.  She denies cough or congestion.  She does have numbness and pain in her hands.  No open areas on her skin. She is with chronic right shoulder pain and Voltaren gel  QID PRN she is currently being followed by Occupational Therapy for wheelchair positioning         ALLERGIES: Dust mites, Food allergy formula, and Pollen  extract  PAST MEDICAL HISTORY:   Past Medical History:   Diagnosis Date     AION (anterior ischaemic optic neuropathy), left eye     NAION LE     CAD (coronary artery disease) 3/2009    [facility]; Angio  UM- normal coronary arteries     Cataract      CVA (cerebral vascular accident) (H)     admitted at [facility]     on Cymbalta     Diabetes mellitus, type 2 (H)      Diabetic nephropathy (H)      Diabetic neuropathy (H)     severe     Diabetic retinopathy (H)      GERD (gastroesophageal reflux disease)      Hyperlipidemia      Hypertension     ECHO 2013, TDS, NL EF     POAG (primary open-angle glaucoma)     adv BE     Seasonal allergies      Tubular adenoma of colon 2013    repeat colonoscopy in 2018      PAST SURGICAL HISTORY:  has a past surgical history that includes Extracapsular cataract extration with intraocular lens implant (11-10-09, 2-9-10); Colonoscopy (7/15/2013);  section; stent, coronary, lainey (); DaVINCI hysterectomy total, bilateral salpingo-oophorectomy, combined (N/A, 2019); Colonoscopy (N/A, 2020); Coronary Stent Placement (2004); Cardiac catheterization; Hysterectomy total abdominal, bilateral salpingo-oophorectomy, combined (2019); Cataract Extraction W/ Intraocular Lens Implant (Bilateral); and  Section.  IMMUNIZATIONS:  Immunization History   Administered Date(s) Administered     COVID-19,PF,Moderna 2020, 2021, 2021     FLU 6-35 months 2009, 2010     Flu, Unspecified 10/26/2016, 10/12/2017, 10/15/2018, 10/11/2019, 10/22/2020     Influenza (High Dose) 3 valent vaccine 10/15/2018, 10/11/2019, 10/22/2020, 10/18/2021     Influenza (IIV3) PF 2003, 11/10/2005, 10/19/2006, 2009, 10/24/2011, 2012, 10/21/2015     Influenza Vaccine IM > 6 months Valent IIV4 (Alfuria,Fluzone) 2014     Influenza Vaccine IM Ages 6-35 Months 4 Valent (PF) 10/18/2021     Pneumococcal (PCV 7) 2003      Pneumococcal 23 valent 10/24/2011     TD (ADULT, 7+) 05/13/2003     TDAP Vaccine (Boostrix) 06/12/2014     Tdap (Adacel,Boostrix) 06/12/2014     Zoster vaccine, live 12/12/2013         Current Outpatient Medications:      acetaminophen (TYLENOL) 500 MG tablet, Take 1,000 mg by mouth 3 times daily, Disp: , Rfl:      ASPIRIN LOW DOSE 81 MG chewable tablet, CHEW AND SWALLOW 1 TAB BY MOUTH ONCE DAILY IN THE MORNING, Disp: , Rfl: 99     atorvastatin (LIPITOR) 80 MG tablet, Take 1 tablet by mouth daily. Pt needs to have labs done prior to further refills., Disp: 90 tablet, Rfl: 0     benzocaine-menthol (CEPACOL) 15-3.6 MG lozenge, Place 1 lozenge inside cheek every 2 hours as needed for moderate pain, Disp: , Rfl:      carboxymethylcellulose (REFRESH PLUS) 0.5 % SOLN, 1 drop 3 times daily as needed., Disp: , Rfl:      CHLORTHALIDONE PO, Take 25 mg by mouth every morning , Disp: , Rfl:      dorzolamide-timolol 2-0.5 % OP ophthalmic solution, Place 1 drop into both eyes 2 times daily, Disp: 1 Bottle, Rfl: 11     DULoxetine HCl (CYMBALTA PO), Take 90 mg by mouth every morning , Disp: , Rfl:      folic acid (FOLVITE) 1 MG tablet, Take 1 mg by mouth every morning , Disp: , Rfl:      gabapentin (NEURONTIN) 300 MG capsule, Take 400 mg by mouth 3 times daily, Disp: , Rfl:      insulin glargine U-300 (TOUJEO) 300 UNIT/ML (1 units dial) pen, Inject 70 Units Subcutaneous At Bedtime, Disp: 70 mL, Rfl: 3     ketoconazole (NIZORAL) 2 % shampoo, To entire wet scalp and ears and then wash off after 5 minutes three times a week., Disp: 240 mL, Rfl: 11     latanoprost (XALATAN) 0.005 % ophthalmic solution, Place 1 drop into both eyes daily, Disp: , Rfl:      liraglutide (VICTOZA PEN) 18 MG/3ML solution, Inject 1.8 mg Subcutaneous daily, Disp: 9 mL, Rfl: 11     loperamide (IMODIUM A-D) 2 MG tablet, Take 2 mg by mouth 4 times daily as needed for diarrhea, Disp: , Rfl:      loratadine (CLARITIN) 10 MG tablet, Take 10 mg by mouth as needed.,  Disp: , Rfl:      metFORMIN (GLUCOPHAGE-XR) 500 MG 24 hr tablet, Take 2 tablets (1,000 mg) by mouth daily (with dinner), Disp: 180 tablet, Rfl: 3     metoprolol tartrate (LOPRESSOR) 100 MG tablet, Take 100 mg by mouth 2 times daily, Disp: , Rfl:      nitroGLYCERIN (NITROSTAT) 0.4 MG SL tablet, Place 0.4 mg under the tongue every 5 minutes as needed., Disp: , Rfl:      olopatadine HCl (PATADAY) 0.2 % SOLN, Place 1 drop into both eyes daily as needed For itchy eyes, Disp: 1 Bottle, Rfl: 5     omeprazole (PRILOSEC) 20 MG DR capsule, Take 20 mg by mouth, Disp: , Rfl:      pregabalin (LYRICA) 75 MG capsule, Take 1 capsule (75 mg) by mouth 3 times daily, Disp: 90 capsule, Rfl: 3     senna-docusate (SENOKOT-S/PERICOLACE) 8.6-50 MG tablet, Take 2 tablets by mouth 2 times daily as needed for constipation, Disp: 60 tablet, Rfl: 0     simethicone (MYLICON) 125 MG chewable tablet, Take 125 mg by mouth 4 times daily as needed After meals and HS prn, Disp: , Rfl:      tetrahydrozoline (VISINE) 0.05 % ophthalmic solution, Place 2 drops into both eyes 4 times daily, Disp: , Rfl:      ROS:  Constitutional: Positive for activity change. Negative for appetite change, chills, fatigue and fever.        Lift for transfers.    HENT: Negative for congestion and sore throat.    Respiratory: Negative for shortness of breath and wheezing.    Cardiovascular: negative for leg swelling . Negative for chest pain. (intermittent Right  upper chest discomfort. She was seen by cardiology and no known cause found.    compression stockings   Gastrointestinal: Negative for abdominal distention, abdominal pain, constipation, diarrhea and nausea.   Genitourinary: Negative for dysuria.   Musculoskeletal: Positive for arthralgias. Negative for myalgias.   Skin: Negative for color change, rash and wound.   Neurological: Positive for numbness. Negative for dizziness and weakness.         neuropathy bilateral hands   Psychiatric/Behavioral: Negative  "for agitation, behavioral problems and sleep disturbance.     Vitals:  /76   Pulse 63   Temp 98.3  F (36.8  C)   Resp 18   Ht 1.6 m (5' 3\")   Wt 70.3 kg (155 lb)   SpO2 100%   BMI 27.46 kg/m   Body mass index is 27.46 kg/m .  Exam:   Abdominal: She exhibits distension.  Abdomen is soft  Skin:    healed old lap sites on abdomen     Constitutional: She is oriented to person, place, and time. She appears well-developed and well-nourished.   Pleasant woman in no acute distress.   HENT:   Head: Normocephalic.   Eyes: Conjunctivae are normal.   Neck: Normal range of motion.   Cardiovascular: Normal rate, regular rhythm and normal heart sounds.   No murmur heard.  Pulmonary/Chest: Breath sounds normal. No respiratory distress. She has no wheezes. She has no rales.   Abdominal: Soft. Bowel sounds are normal. She exhibits no distension. There is no tenderness.   Musculoskeletal: She exhibits edema.   Wearing Compression socks   Neurological: She is alert and oriented to person, place, and time.   Neuropathy bilateral hands.   Skin: Skin is warm.   Psychiatric: She has a normal mood and affect. Her behavior is normal    Lab/Diagnostic data:   No results found for this or any previous visit (from the past 240 hour(s)).    ASSESSMENT/PLAN    Chronic right shoulder pain- Voltaren gel topically QID PRN, Tylenol TID    IDDM. FS are stable last A1C 8.1 on 6/17/2021. Endocrinologist following , Continue current diabetic medications as above.       HTN. Continue  Lisinpril and metoprolol-stable     Neuropathy   On lyrica and gabapentin increased to 400 mg 3 times daily on 4/27/2022.     CAD. HX  MI and stent.      S/p Hysterectomy in October 2019     Electronically signed by:  Shawna Mesa CNP         Sincerely,        Shawna Mesa CNP      "

## 2022-04-27 NOTE — PROGRESS NOTES
[facility] GERIATRIC SERVICES  Chief Complaint   Patient presents with     long term Acute     [place] Medical Record Number:  [id number]  Place of Service where encounter took place:  [facility] () [[id number]]    HPI:    Cristal Preciado  is a 71 year old  (1950), who is being seen today for an annual comprehensive visit. She resides   in the long-term care facility [facility]. She has been a resident here since October 2011. She does have multiple complex co- morbidities. She is treated for hypertension and has insulin-dependent diabetes mellitus. She has a endocrinologist who follows her  blood sugars regularly.  She has chronic neuropathy, chronic kidney disease and is treated with Cymbalta for depression. She is on gabapentin and lyrica for neuropathy pain. She is on meds for depression.  Does require a lift for transfers. She has chronic weakness in her lower extremities.      Today she is seen for watery increased reports of neuropathy in her hands and foot.  Patient went out with family and brought in her own bottle of Tylenol arthritis.  It was explained to her by staff she is currently on extra strength Tylenol scheduled 3 times daily.  Her gabapentin was increased to 400 mg 3 times daily. She denies chest pain or shortness of breath.  She denies cough or congestion.  She is  non ambulatory, She is on  gabapentin and lyrica for neuropathy.. Her BS are being controlled by her endocrinologist and being sent in every couple of weeks.  She denies cough or congestion.  She does have numbness and pain in her hands.  No open areas on her skin. She is with chronic right shoulder pain and Voltaren gel  QID PRN she is currently being followed by Occupational Therapy for wheelchair positioning         ALLERGIES: Dust mites, Food allergy formula, and Pollen extract  PAST MEDICAL HISTORY:   Past Medical History:   Diagnosis Date     AION (anterior ischaemic optic neuropathy), left  eye     NAION LE     CAD (coronary artery disease) 3/2009    [facility]; Angio  UM- normal coronary arteries     Cataract      CVA (cerebral vascular accident) (H)     admitted at [facility]     on Cymbalta     Diabetes mellitus, type 2 (H)      Diabetic nephropathy (H)      Diabetic neuropathy (H)     severe     Diabetic retinopathy (H)      GERD (gastroesophageal reflux disease)      Hyperlipidemia      Hypertension     ECHO , TDS, NL EF     POAG (primary open-angle glaucoma)     adv BE     Seasonal allergies      Tubular adenoma of colon     repeat colonoscopy in 2018      PAST SURGICAL HISTORY:  has a past surgical history that includes Extracapsular cataract extration with intraocular lens implant (11-10-09, 2-9-10); Colonoscopy (7/15/2013);  section; stent, coronary, lainey (); DaVINCI hysterectomy total, bilateral salpingo-oophorectomy, combined (N/A, 2019); Colonoscopy (N/A, 2020); Coronary Stent Placement (2004); Cardiac catheterization; Hysterectomy total abdominal, bilateral salpingo-oophorectomy, combined (2019); Cataract Extraction W/ Intraocular Lens Implant (Bilateral); and  Section.  IMMUNIZATIONS:  Immunization History   Administered Date(s) Administered     COVID-19,PF,Moderna 2020, 2021, 2021     FLU 6-35 months 2009, 2010     Flu, Unspecified 10/26/2016, 10/12/2017, 10/15/2018, 10/11/2019, 10/22/2020     Influenza (High Dose) 3 valent vaccine 10/15/2018, 10/11/2019, 10/22/2020, 10/18/2021     Influenza (IIV3) PF 2003, 11/10/2005, 10/19/2006, 2009, 10/24/2011, 2012, 10/21/2015     Influenza Vaccine IM > 6 months Valent IIV4 (Alfuria,Fluzone) 2014     Influenza Vaccine IM Ages 6-35 Months 4 Valent (PF) 10/18/2021     Pneumococcal (PCV 7) 2003     Pneumococcal 23 valent 10/24/2011     TD (ADULT, 7+) 2003     TDAP Vaccine (Boostrix) 2014     Tdap  (Adacel,Boostrix) 06/12/2014     Zoster vaccine, live 12/12/2013         Current Outpatient Medications:      acetaminophen (TYLENOL) 500 MG tablet, Take 1,000 mg by mouth 3 times daily, Disp: , Rfl:      ASPIRIN LOW DOSE 81 MG chewable tablet, CHEW AND SWALLOW 1 TAB BY MOUTH ONCE DAILY IN THE MORNING, Disp: , Rfl: 99     atorvastatin (LIPITOR) 80 MG tablet, Take 1 tablet by mouth daily. Pt needs to have labs done prior to further refills., Disp: 90 tablet, Rfl: 0     benzocaine-menthol (CEPACOL) 15-3.6 MG lozenge, Place 1 lozenge inside cheek every 2 hours as needed for moderate pain, Disp: , Rfl:      carboxymethylcellulose (REFRESH PLUS) 0.5 % SOLN, 1 drop 3 times daily as needed., Disp: , Rfl:      CHLORTHALIDONE PO, Take 25 mg by mouth every morning , Disp: , Rfl:      dorzolamide-timolol 2-0.5 % OP ophthalmic solution, Place 1 drop into both eyes 2 times daily, Disp: 1 Bottle, Rfl: 11     DULoxetine HCl (CYMBALTA PO), Take 90 mg by mouth every morning , Disp: , Rfl:      folic acid (FOLVITE) 1 MG tablet, Take 1 mg by mouth every morning , Disp: , Rfl:      gabapentin (NEURONTIN) 300 MG capsule, Take 400 mg by mouth 3 times daily, Disp: , Rfl:      insulin glargine U-300 (TOUJEO) 300 UNIT/ML (1 units dial) pen, Inject 70 Units Subcutaneous At Bedtime, Disp: 70 mL, Rfl: 3     ketoconazole (NIZORAL) 2 % shampoo, To entire wet scalp and ears and then wash off after 5 minutes three times a week., Disp: 240 mL, Rfl: 11     latanoprost (XALATAN) 0.005 % ophthalmic solution, Place 1 drop into both eyes daily, Disp: , Rfl:      liraglutide (VICTOZA PEN) 18 MG/3ML solution, Inject 1.8 mg Subcutaneous daily, Disp: 9 mL, Rfl: 11     loperamide (IMODIUM A-D) 2 MG tablet, Take 2 mg by mouth 4 times daily as needed for diarrhea, Disp: , Rfl:      loratadine (CLARITIN) 10 MG tablet, Take 10 mg by mouth as needed., Disp: , Rfl:      metFORMIN (GLUCOPHAGE-XR) 500 MG 24 hr tablet, Take 2 tablets (1,000 mg) by mouth daily (with  dinner), Disp: 180 tablet, Rfl: 3     metoprolol tartrate (LOPRESSOR) 100 MG tablet, Take 100 mg by mouth 2 times daily, Disp: , Rfl:      nitroGLYCERIN (NITROSTAT) 0.4 MG SL tablet, Place 0.4 mg under the tongue every 5 minutes as needed., Disp: , Rfl:      olopatadine HCl (PATADAY) 0.2 % SOLN, Place 1 drop into both eyes daily as needed For itchy eyes, Disp: 1 Bottle, Rfl: 5     omeprazole (PRILOSEC) 20 MG DR capsule, Take 20 mg by mouth, Disp: , Rfl:      pregabalin (LYRICA) 75 MG capsule, Take 1 capsule (75 mg) by mouth 3 times daily, Disp: 90 capsule, Rfl: 3     senna-docusate (SENOKOT-S/PERICOLACE) 8.6-50 MG tablet, Take 2 tablets by mouth 2 times daily as needed for constipation, Disp: 60 tablet, Rfl: 0     simethicone (MYLICON) 125 MG chewable tablet, Take 125 mg by mouth 4 times daily as needed After meals and HS prn, Disp: , Rfl:      tetrahydrozoline (VISINE) 0.05 % ophthalmic solution, Place 2 drops into both eyes 4 times daily, Disp: , Rfl:      ROS:  Constitutional: Positive for activity change. Negative for appetite change, chills, fatigue and fever.        Lift for transfers.    HENT: Negative for congestion and sore throat.    Respiratory: Negative for shortness of breath and wheezing.    Cardiovascular: negative for leg swelling . Negative for chest pain. (intermittent Right  upper chest discomfort. She was seen by cardiology and no known cause found.    compression stockings   Gastrointestinal: Negative for abdominal distention, abdominal pain, constipation, diarrhea and nausea.   Genitourinary: Negative for dysuria.   Musculoskeletal: Positive for arthralgias. Negative for myalgias.   Skin: Negative for color change, rash and wound.   Neurological: Positive for numbness. Negative for dizziness and weakness.         neuropathy bilateral hands   Psychiatric/Behavioral: Negative for agitation, behavioral problems and sleep disturbance.     Vitals:  /76   Pulse 63   Temp 98.3  F (36.8  C)    "Resp 18   Ht 1.6 m (5' 3\")   Wt 70.3 kg (155 lb)   SpO2 100%   BMI 27.46 kg/m   Body mass index is 27.46 kg/m .  Exam:   Abdominal: She exhibits distension.  Abdomen is soft  Skin:    healed old lap sites on abdomen     Constitutional: She is oriented to person, place, and time. She appears well-developed and well-nourished.   Pleasant woman in no acute distress.   HENT:   Head: Normocephalic.   Eyes: Conjunctivae are normal.   Neck: Normal range of motion.   Cardiovascular: Normal rate, regular rhythm and normal heart sounds.   No murmur heard.  Pulmonary/Chest: Breath sounds normal. No respiratory distress. She has no wheezes. She has no rales.   Abdominal: Soft. Bowel sounds are normal. She exhibits no distension. There is no tenderness.   Musculoskeletal: She exhibits edema.   Wearing Compression socks   Neurological: She is alert and oriented to person, place, and time.   Neuropathy bilateral hands.   Skin: Skin is warm.   Psychiatric: She has a normal mood and affect. Her behavior is normal    Lab/Diagnostic data:   No results found for this or any previous visit (from the past 240 hour(s)).    ASSESSMENT/PLAN    Chronic right shoulder pain- Voltaren gel topically QID PRN, Tylenol TID    IDDM. FS are stable last A1C 8.1 on 6/17/2021. Endocrinologist following , Continue current diabetic medications as above.       HTN. Continue  Lisinpril and metoprolol-stable     Neuropathy   On lyrica and gabapentin increased to 400 mg 3 times daily on 4/27/2022.     CAD. HX  MI and stent.      S/p Hysterectomy in October 2019     Electronically signed by:  Shawna Mesa CNP   "

## 2022-04-30 ENCOUNTER — MEDICAL CORRESPONDENCE (OUTPATIENT)
Dept: HEALTH INFORMATION MANAGEMENT | Facility: CLINIC | Age: 72
End: 2022-04-30
Payer: COMMERCIAL

## 2022-05-03 ENCOUNTER — MEDICAL CORRESPONDENCE (OUTPATIENT)
Dept: HEALTH INFORMATION MANAGEMENT | Facility: CLINIC | Age: 72
End: 2022-05-03
Payer: COMMERCIAL

## 2022-05-04 ENCOUNTER — NURSING HOME VISIT (OUTPATIENT)
Dept: GERIATRICS | Facility: CLINIC | Age: 72
End: 2022-05-04
Payer: COMMERCIAL

## 2022-05-04 ENCOUNTER — MEDICAL CORRESPONDENCE (OUTPATIENT)
Dept: HEALTH INFORMATION MANAGEMENT | Facility: CLINIC | Age: 72
End: 2022-05-04

## 2022-05-04 VITALS
BODY MASS INDEX: 27.46 KG/M2 | OXYGEN SATURATION: 99 % | RESPIRATION RATE: 18 BRPM | HEIGHT: 63 IN | SYSTOLIC BLOOD PRESSURE: 122 MMHG | HEART RATE: 68 BPM | DIASTOLIC BLOOD PRESSURE: 66 MMHG | WEIGHT: 155 LBS | TEMPERATURE: 98.2 F

## 2022-05-04 DIAGNOSIS — E11.69 TYPE 2 DIABETES MELLITUS WITH OTHER SPECIFIED COMPLICATION, WITH LONG-TERM CURRENT USE OF INSULIN (H): ICD-10-CM

## 2022-05-04 DIAGNOSIS — G62.9 NEUROPATHY: Primary | ICD-10-CM

## 2022-05-04 DIAGNOSIS — Z79.4 TYPE 2 DIABETES MELLITUS WITH OTHER SPECIFIED COMPLICATION, WITH LONG-TERM CURRENT USE OF INSULIN (H): ICD-10-CM

## 2022-05-04 DIAGNOSIS — R53.81 PHYSICAL DECONDITIONING: ICD-10-CM

## 2022-05-04 PROCEDURE — 99309 SBSQ NF CARE MODERATE MDM 30: CPT | Performed by: NURSE PRACTITIONER

## 2022-05-04 RX ORDER — BISACODYL 10 MG
10 SUPPOSITORY, RECTAL RECTAL DAILY PRN
Status: ON HOLD | COMMUNITY
End: 2023-01-01

## 2022-05-04 RX ORDER — MINERAL OIL, PETROLATUM 425; 568 MG/G; MG/G
OINTMENT OPHTHALMIC AT BEDTIME
COMMUNITY
End: 2022-05-11

## 2022-05-04 NOTE — PROGRESS NOTES
[facility] GERIATRIC SERVICES  Chief Complaint   Patient presents with     half-way Regulatory     [place] Medical Record Number:  [id number]  Place of Service where encounter took place:  [facility] () [[id number]]    HPI:    Cristal Preciado  is a 71 year old  (1950), who is being seen today for an annual comprehensive visit. She resides   in the long-term care facility [facility]. She has been a resident here since October 2011. She does have multiple complex co- morbidities. She is treated for hypertension and has insulin-dependent diabetes mellitus. She has a endocrinologist who follows her  blood sugars regularly.  She has chronic neuropathy, chronic kidney disease and is treated with Cymbalta for depression. She is on gabapentin and lyrica for neuropathy pain. She is on meds for depression.  Does require a lift for transfers. She has chronic weakness in her lower extremities.      Today she is seen for a routine regulatory visit.  She denies chest pain or shortness of breath.  She denies cough or congestion.  She is  non ambulatory, She is on  gabapentin and lyrica for neuropathy.. Her BS are being controlled by her endocrinologist and being sent in every couple of weeks.  She recently had her Victoza increased however now her blood sugars are dropping.  Endocrinology has been notified. She denies cough or congestion.  She does have numbness and pain in her hands.  Her pain was better today and she tells writer that she has a pair of Isotoner gloves on order. No open areas on her skin. She is with chronic right shoulder pain and Voltaren gel  QID PRN.  Her weights were reviewed and she is down 4 pounds over the last month.  MDS last reviewed. 4/21/2022     ALLERGIES: Dust mites, Food allergy formula, and Pollen extract  PAST MEDICAL HISTORY:   Past Medical History:   Diagnosis Date     AION (anterior ischaemic optic neuropathy), left eye     NAION LE     CAD (coronary  artery disease) 3/2009    [facility]; Angio  UM- normal coronary arteries     Cataract      CVA (cerebral vascular accident) (H)     admitted at [facility]     on Cymbalta     Diabetes mellitus, type 2 (H)      Diabetic nephropathy (H)      Diabetic neuropathy (H)     severe     Diabetic retinopathy (H)      GERD (gastroesophageal reflux disease)      Hyperlipidemia      Hypertension     ECHO , TDS, NL EF     POAG (primary open-angle glaucoma)     adv BE     Seasonal allergies      Tubular adenoma of colon     repeat colonoscopy in       PAST SURGICAL HISTORY:  has a past surgical history that includes Extracapsular cataract extration with intraocular lens implant (11-10-09, 2-9-10); Colonoscopy (7/15/2013);  section; stent, coronary, lainey (); DaVINCI hysterectomy total, bilateral salpingo-oophorectomy, combined (N/A, 2019); Colonoscopy (N/A, 2020); Coronary Stent Placement (2004); Cardiac catheterization; Hysterectomy total abdominal, bilateral salpingo-oophorectomy, combined (2019); Cataract Extraction W/ Intraocular Lens Implant (Bilateral); and  Section.  IMMUNIZATIONS:  Immunization History   Administered Date(s) Administered     COVID-19,PF,Moderna 2020, 2021, 2021     FLU 6-35 months 2009, 2010     Flu, Unspecified 10/26/2016, 10/12/2017, 10/15/2018, 10/11/2019, 10/22/2020     Influenza (High Dose) 3 valent vaccine 10/15/2018, 10/11/2019, 10/22/2020, 10/18/2021     Influenza (IIV3) PF 2003, 11/10/2005, 10/19/2006, 2009, 10/24/2011, 2012, 10/21/2015     Influenza Vaccine IM > 6 months Valent IIV4 (Alfuria,Fluzone) 2014     Influenza Vaccine IM Ages 6-35 Months 4 Valent (PF) 10/18/2021     Pneumococcal (PCV 7) 2003     Pneumococcal 23 valent 10/24/2011     TD (ADULT, 7+) 2003     TDAP Vaccine (Boostrix) 2014     Tdap (Adacel,Boostrix) 2014     Zoster  vaccine, live 12/12/2013         Current Outpatient Medications:      acetaminophen (TYLENOL) 500 MG tablet, Take 1,000 mg by mouth 3 times daily, Disp: , Rfl:      ASPIRIN LOW DOSE 81 MG chewable tablet, CHEW AND SWALLOW 1 TAB BY MOUTH ONCE DAILY IN THE MORNING, Disp: , Rfl: 99     atorvastatin (LIPITOR) 80 MG tablet, Take 1 tablet by mouth daily. Pt needs to have labs done prior to further refills., Disp: 90 tablet, Rfl: 0     benzocaine-menthol (CEPACOL) 15-3.6 MG lozenge, Place 1 lozenge inside cheek every 2 hours as needed for moderate pain, Disp: , Rfl:      bisacodyl (DULCOLAX) 10 MG suppository, Place 10 mg rectally daily as needed for constipation, Disp: , Rfl:      carboxymethylcellulose (REFRESH PLUS) 0.5 % SOLN, 1 drop 3 times daily as needed., Disp: , Rfl:      CHLORTHALIDONE PO, Take 25 mg by mouth every morning , Disp: , Rfl:      diclofenac (VOLTAREN) 1 % topical gel, Apply 2 g topically 4 times daily, Disp: , Rfl:      dorzolamide-timolol 2-0.5 % OP ophthalmic solution, Place 1 drop into both eyes 2 times daily, Disp: 1 Bottle, Rfl: 11     DULoxetine HCl (CYMBALTA PO), Take 90 mg by mouth every morning , Disp: , Rfl:      folic acid (FOLVITE) 1 MG tablet, Take 1 mg by mouth every morning , Disp: , Rfl:      gabapentin (NEURONTIN) 300 MG capsule, Take 400 mg by mouth 3 times daily, Disp: , Rfl:      insulin glargine U-300 (TOUJEO) 300 UNIT/ML (1 units dial) pen, Inject 70 Units Subcutaneous At Bedtime, Disp: 70 mL, Rfl: 3     ketoconazole (NIZORAL) 2 % shampoo, To entire wet scalp and ears and then wash off after 5 minutes three times a week., Disp: 240 mL, Rfl: 11     latanoprost (XALATAN) 0.005 % ophthalmic solution, Place 1 drop into both eyes daily, Disp: , Rfl:      liraglutide (VICTOZA PEN) 18 MG/3ML solution, Inject 1.8 mg Subcutaneous daily, Disp: 9 mL, Rfl: 11     loperamide (IMODIUM A-D) 2 MG tablet, Take 2 mg by mouth 4 times daily as needed for diarrhea, Disp: , Rfl:      loratadine  (CLARITIN) 10 MG tablet, Take 10 mg by mouth as needed., Disp: , Rfl:      metFORMIN (GLUCOPHAGE-XR) 500 MG 24 hr tablet, Take 2 tablets (1,000 mg) by mouth daily (with dinner), Disp: 180 tablet, Rfl: 3     metoprolol tartrate (LOPRESSOR) 100 MG tablet, Take 100 mg by mouth 2 times daily, Disp: , Rfl:      nitroGLYCERIN (NITROSTAT) 0.4 MG SL tablet, Place 0.4 mg under the tongue every 5 minutes as needed., Disp: , Rfl:      olopatadine HCl (PATADAY) 0.2 % SOLN, Place 1 drop into both eyes daily as needed For itchy eyes, Disp: 1 Bottle, Rfl: 5     omeprazole (PRILOSEC) 20 MG DR capsule, Take 20 mg by mouth, Disp: , Rfl:      pregabalin (LYRICA) 75 MG capsule, Take 1 capsule (75 mg) by mouth 3 times daily, Disp: 90 capsule, Rfl: 3     senna-docusate (SENOKOT-S/PERICOLACE) 8.6-50 MG tablet, Take 2 tablets by mouth 2 times daily as needed for constipation, Disp: 60 tablet, Rfl: 0     simethicone (MYLICON) 125 MG chewable tablet, Take 125 mg by mouth 4 times daily as needed After meals and HS prn, Disp: , Rfl:      tetrahydrozoline (VISINE) 0.05 % ophthalmic solution, Place 2 drops into both eyes 4 times daily, Disp: , Rfl:      White Petrolatum-Mineral Oil (REFRESH LACRI-LUBE) OINT, Apply to eye At Bedtime, Disp: , Rfl:      ROS:  Constitutional: Positive for activity change. Negative for appetite change, chills, fatigue and fever.        Lift for transfers.    HENT: Negative for congestion and sore throat.    Respiratory: Negative for shortness of breath and wheezing.    Cardiovascular: negative for leg swelling . Negative for chest pain. (intermittent Right  upper chest discomfort. She was seen by cardiology and no known cause found.    compression stockings   Gastrointestinal: Negative for abdominal distention, abdominal pain, constipation, diarrhea and nausea.   Genitourinary: Negative for dysuria.   Musculoskeletal: Positive for arthralgias. Negative for myalgias.   Skin: Negative for color change, rash and wound.  "  Neurological: Positive for numbness. Negative for dizziness and weakness.         neuropathy bilateral hands   Psychiatric/Behavioral: Negative for agitation, behavioral problems and sleep disturbance.     Vitals:  /66   Pulse 68   Temp 98.2  F (36.8  C)   Resp 18   Ht 1.6 m (5' 3\")   Wt 70.3 kg (155 lb)   SpO2 99%   BMI 27.46 kg/m   Body mass index is 27.46 kg/m .  Exam:   Abdominal: .  Abdomen is soft  Skin:    healed old lap sites on abdomen     Constitutional: She is oriented to person, place, and time. She appears well-developed and well-nourished.   Pleasant woman in no acute distress.   HENT:   Head: Normocephalic.   Eyes: Conjunctivae are normal.   Neck: Normal range of motion.   Cardiovascular: Normal rate, regular rhythm and normal heart sounds.   No murmur heard.  Pulmonary/Chest: Breath sounds normal. No respiratory distress. She has no wheezes. She has no rales.   Abdominal: Soft. Bowel sounds are normal. She exhibits no distension. There is no tenderness.   Musculoskeletal: She exhibits edema.   Wearing Compression socks   Neurological: She is alert and oriented to person, place, and time.   Neuropathy bilateral hands.   Skin: Skin is warm.   Psychiatric: She has a normal mood and affect. Her behavior is normal    Lab/Diagnostic data:   No results found for this or any previous visit (from the past 240 hour(s)).    ASSESSMENT/PLAN    Chronic right shoulder pain- Voltaren gel topically QID PRN, Tylenol TID    IDDM. FS are stable last A1C 8.1 on 6/17/2021. Endocrinologist following , Continue current diabetic medications as above.       HTN. Continue  Lisinpril and metoprolol-stable     Neuropathy   On lyrica and gabapentin increased to 400 mg 3 times daily on 4/27/2022.     CAD. HX  MI and stent.      S/p Hysterectomy in October 2019     Electronically signed by:  Shawna Mesa CNP   "

## 2022-05-04 NOTE — LETTER
5/4/2022        RE: Cristal Preciado  [facility]  [address] [address]        [facility]  Chief Complaint   Patient presents with     [facility] Regulatory     [place] Medical Record Number:  [id number]  Place of Service where encounter took place:  [facility] [place] () [[id number]]    HPI:    Cristal Preciado  is a 71 year old  (1950), who is being seen today for an annual comprehensive visit. She resides   in the long-term care facility [facility]. She has been a resident here since October 2011. She does have multiple complex co- morbidities. She is treated for hypertension and has insulin-dependent diabetes mellitus. She has a endocrinologist who follows her  blood sugars regularly.  She has chronic neuropathy, chronic kidney disease and is treated with Cymbalta for depression. She is on gabapentin and lyrica for neuropathy pain. She is on meds for depression.  Does require a lift for transfers. She has chronic weakness in her lower extremities.      Today she is seen for a routine regulatory visit.  She denies chest pain or shortness of breath.  She denies cough or congestion.  She is  non ambulatory, She is on  gabapentin and lyrica for neuropathy.. Her BS are being controlled by her endocrinologist and being sent in every couple of weeks.  She recently had her Victoza increased however now her blood sugars are dropping.  Endocrinology has been notified. She denies cough or congestion.  She does have numbness and pain in her hands.  Her pain was better today and she tells writer that she has a pair of Isotoner gloves on order. No open areas on her skin. She is with chronic right shoulder pain and Voltaren gel  QID PRN.  Her weights were reviewed and she is down 4 pounds over the last month.  MDS last reviewed. 4/21/2022     ALLERGIES: Dust mites, Food allergy formula, and Pollen extract  PAST MEDICAL HISTORY:   Past  Medical History:   Diagnosis Date     AION (anterior ischaemic optic neuropathy), left eye     NAION LE     CAD (coronary artery disease) 3/2009    [facility]; Angio  UM- normal coronary arteries     Cataract      CVA (cerebral vascular accident) (H)     admitted at [facility]     on Cymbalta     Diabetes mellitus, type 2 (H)      Diabetic nephropathy (H)      Diabetic neuropathy (H)     severe     Diabetic retinopathy (H)      GERD (gastroesophageal reflux disease)      Hyperlipidemia      Hypertension     ECHO , TDS, NL EF     POAG (primary open-angle glaucoma)     adv BE     Seasonal allergies      Tubular adenoma of colon 2013    repeat colonoscopy in 2018      PAST SURGICAL HISTORY:  has a past surgical history that includes Extracapsular cataract extration with intraocular lens implant (11-10-09, 2-9-10); Colonoscopy (7/15/2013);  section; stent, coronary, lainey (); DaVINCI hysterectomy total, bilateral salpingo-oophorectomy, combined (N/A, 2019); Colonoscopy (N/A, 2020); Coronary Stent Placement (2004); Cardiac catheterization; Hysterectomy total abdominal, bilateral salpingo-oophorectomy, combined (2019); Cataract Extraction W/ Intraocular Lens Implant (Bilateral); and  Section.  IMMUNIZATIONS:  Immunization History   Administered Date(s) Administered     COVID-19,PF,Moderna 2020, 2021, 2021     FLU 6-35 months 2009, 2010     Flu, Unspecified 10/26/2016, 10/12/2017, 10/15/2018, 10/11/2019, 10/22/2020     Influenza (High Dose) 3 valent vaccine 10/15/2018, 10/11/2019, 10/22/2020, 10/18/2021     Influenza (IIV3) PF 2003, 11/10/2005, 10/19/2006, 2009, 10/24/2011, 2012, 10/21/2015     Influenza Vaccine IM > 6 months Valent IIV4 (Alfuria,Fluzone) 2014     Influenza Vaccine IM Ages 6-35 Months 4 Valent (PF) 10/18/2021     Pneumococcal (PCV 7) 2003     Pneumococcal 23 valent 10/24/2011      TD (ADULT, 7+) 05/13/2003     TDAP Vaccine (Boostrix) 06/12/2014     Tdap (Adacel,Boostrix) 06/12/2014     Zoster vaccine, live 12/12/2013         Current Outpatient Medications:      acetaminophen (TYLENOL) 500 MG tablet, Take 1,000 mg by mouth 3 times daily, Disp: , Rfl:      ASPIRIN LOW DOSE 81 MG chewable tablet, CHEW AND SWALLOW 1 TAB BY MOUTH ONCE DAILY IN THE MORNING, Disp: , Rfl: 99     atorvastatin (LIPITOR) 80 MG tablet, Take 1 tablet by mouth daily. Pt needs to have labs done prior to further refills., Disp: 90 tablet, Rfl: 0     benzocaine-menthol (CEPACOL) 15-3.6 MG lozenge, Place 1 lozenge inside cheek every 2 hours as needed for moderate pain, Disp: , Rfl:      bisacodyl (DULCOLAX) 10 MG suppository, Place 10 mg rectally daily as needed for constipation, Disp: , Rfl:      carboxymethylcellulose (REFRESH PLUS) 0.5 % SOLN, 1 drop 3 times daily as needed., Disp: , Rfl:      CHLORTHALIDONE PO, Take 25 mg by mouth every morning , Disp: , Rfl:      diclofenac (VOLTAREN) 1 % topical gel, Apply 2 g topically 4 times daily, Disp: , Rfl:      dorzolamide-timolol 2-0.5 % OP ophthalmic solution, Place 1 drop into both eyes 2 times daily, Disp: 1 Bottle, Rfl: 11     DULoxetine HCl (CYMBALTA PO), Take 90 mg by mouth every morning , Disp: , Rfl:      folic acid (FOLVITE) 1 MG tablet, Take 1 mg by mouth every morning , Disp: , Rfl:      gabapentin (NEURONTIN) 300 MG capsule, Take 400 mg by mouth 3 times daily, Disp: , Rfl:      insulin glargine U-300 (TOUJEO) 300 UNIT/ML (1 units dial) pen, Inject 70 Units Subcutaneous At Bedtime, Disp: 70 mL, Rfl: 3     ketoconazole (NIZORAL) 2 % shampoo, To entire wet scalp and ears and then wash off after 5 minutes three times a week., Disp: 240 mL, Rfl: 11     latanoprost (XALATAN) 0.005 % ophthalmic solution, Place 1 drop into both eyes daily, Disp: , Rfl:      liraglutide (VICTOZA PEN) 18 MG/3ML solution, Inject 1.8 mg Subcutaneous daily, Disp: 9 mL, Rfl: 11     loperamide  (IMODIUM A-D) 2 MG tablet, Take 2 mg by mouth 4 times daily as needed for diarrhea, Disp: , Rfl:      loratadine (CLARITIN) 10 MG tablet, Take 10 mg by mouth as needed., Disp: , Rfl:      metFORMIN (GLUCOPHAGE-XR) 500 MG 24 hr tablet, Take 2 tablets (1,000 mg) by mouth daily (with dinner), Disp: 180 tablet, Rfl: 3     metoprolol tartrate (LOPRESSOR) 100 MG tablet, Take 100 mg by mouth 2 times daily, Disp: , Rfl:      nitroGLYCERIN (NITROSTAT) 0.4 MG SL tablet, Place 0.4 mg under the tongue every 5 minutes as needed., Disp: , Rfl:      olopatadine HCl (PATADAY) 0.2 % SOLN, Place 1 drop into both eyes daily as needed For itchy eyes, Disp: 1 Bottle, Rfl: 5     omeprazole (PRILOSEC) 20 MG DR capsule, Take 20 mg by mouth, Disp: , Rfl:      pregabalin (LYRICA) 75 MG capsule, Take 1 capsule (75 mg) by mouth 3 times daily, Disp: 90 capsule, Rfl: 3     senna-docusate (SENOKOT-S/PERICOLACE) 8.6-50 MG tablet, Take 2 tablets by mouth 2 times daily as needed for constipation, Disp: 60 tablet, Rfl: 0     simethicone (MYLICON) 125 MG chewable tablet, Take 125 mg by mouth 4 times daily as needed After meals and HS prn, Disp: , Rfl:      tetrahydrozoline (VISINE) 0.05 % ophthalmic solution, Place 2 drops into both eyes 4 times daily, Disp: , Rfl:      White Petrolatum-Mineral Oil (REFRESH LACRI-LUBE) OINT, Apply to eye At Bedtime, Disp: , Rfl:      ROS:  Constitutional: Positive for activity change. Negative for appetite change, chills, fatigue and fever.        Lift for transfers.    HENT: Negative for congestion and sore throat.    Respiratory: Negative for shortness of breath and wheezing.    Cardiovascular: negative for leg swelling . Negative for chest pain. (intermittent Right  upper chest discomfort. She was seen by cardiology and no known cause found.    compression stockings   Gastrointestinal: Negative for abdominal distention, abdominal pain, constipation, diarrhea and nausea.   Genitourinary: Negative for dysuria.  "  Musculoskeletal: Positive for arthralgias. Negative for myalgias.   Skin: Negative for color change, rash and wound.   Neurological: Positive for numbness. Negative for dizziness and weakness.         neuropathy bilateral hands   Psychiatric/Behavioral: Negative for agitation, behavioral problems and sleep disturbance.     Vitals:  /66   Pulse 68   Temp 98.2  F (36.8  C)   Resp 18   Ht 1.6 m (5' 3\")   Wt 70.3 kg (155 lb)   SpO2 99%   BMI 27.46 kg/m   Body mass index is 27.46 kg/m .  Exam:   Abdominal: .  Abdomen is soft  Skin:    healed old lap sites on abdomen     Constitutional: She is oriented to person, place, and time. She appears well-developed and well-nourished.   Pleasant woman in no acute distress.   HENT:   Head: Normocephalic.   Eyes: Conjunctivae are normal.   Neck: Normal range of motion.   Cardiovascular: Normal rate, regular rhythm and normal heart sounds.   No murmur heard.  Pulmonary/Chest: Breath sounds normal. No respiratory distress. She has no wheezes. She has no rales.   Abdominal: Soft. Bowel sounds are normal. She exhibits no distension. There is no tenderness.   Musculoskeletal: She exhibits edema.   Wearing Compression socks   Neurological: She is alert and oriented to person, place, and time.   Neuropathy bilateral hands.   Skin: Skin is warm.   Psychiatric: She has a normal mood and affect. Her behavior is normal    Lab/Diagnostic data:   No results found for this or any previous visit (from the past 240 hour(s)).    ASSESSMENT/PLAN    Chronic right shoulder pain- Voltaren gel topically QID PRN, Tylenol TID    IDDM. FS are stable last A1C 8.1 on 6/17/2021. Endocrinologist following , Continue current diabetic medications as above.       HTN. Continue  Lisinpril and metoprolol-stable     Neuropathy   On lyrica and gabapentin increased to 400 mg 3 times daily on 4/27/2022.     CAD. HX  MI and stent.      S/p Hysterectomy in October 2019     Electronically signed by:  Shawna " Moshe, DANIELE         Sincerely,        Shawna Mesa, CNP

## 2022-05-11 ENCOUNTER — NURSING HOME VISIT (OUTPATIENT)
Dept: GERIATRICS | Facility: CLINIC | Age: 72
End: 2022-05-11
Payer: COMMERCIAL

## 2022-05-11 VITALS
RESPIRATION RATE: 18 BRPM | DIASTOLIC BLOOD PRESSURE: 66 MMHG | HEART RATE: 78 BPM | OXYGEN SATURATION: 98 % | WEIGHT: 155 LBS | BODY MASS INDEX: 27.46 KG/M2 | SYSTOLIC BLOOD PRESSURE: 122 MMHG | TEMPERATURE: 98.5 F | HEIGHT: 63 IN

## 2022-05-11 DIAGNOSIS — Z72.0 NICOTINE USE: Primary | ICD-10-CM

## 2022-05-11 PROCEDURE — 99309 SBSQ NF CARE MODERATE MDM 30: CPT | Performed by: NURSE PRACTITIONER

## 2022-05-11 NOTE — PROGRESS NOTES
[facility] GERIATRIC SERVICES  Chief Complaint   Patient presents with     half-way Acute     [place] Medical Record Number:  [id number]  Place of Service where encounter took place:  [facility] [place] () [[id number]]    HPI:    Cristal Preciado  is a 71 year old  (1950), who is being seen today for an annual comprehensive visit. She resides   in the long-term care facility [facility]. She has been a resident here since October 2011. She does have multiple complex co- morbidities. She is treated for hypertension and has insulin-dependent diabetes mellitus. She has a endocrinologist who follows her  blood sugars regularly.  She has chronic neuropathy, chronic kidney disease and is treated with Cymbalta for depression. She is on gabapentin and lyrica for neuropathy pain. She is on meds for depression.  Does require a lift for transfers. She has chronic weakness in her lower extremities.      Today she is seen for nicotine use.  Resident does go out independently to the stores and it appears she came back with a pack of cigarettes and a lighter.  Writer spoke with patient today and she said they were over 3 weeks old and then reported she has not had a cigarette in 3 months.  She has declined any interventions and did not feel she needed them because she said she was not smoking. She denies chest pain or shortness of breath.  She denies cough or congestion.  She is  non ambulatory, She is on  gabapentin and lyrica for neuropathy.. Her BS are being controlled by her endocrinologist and being sent in every couple of weeks.  She denies cough or congestion.  She does have numbness and pain in her hands.  No open areas on her skin.         ALLERGIES: Dust mites, Food allergy formula, and Pollen extract  PAST MEDICAL HISTORY:   Past Medical History:   Diagnosis Date     AION (anterior ischaemic optic neuropathy), left eye     NAION LE     CAD (coronary artery disease) 3/2009    [facility]  [place]; Angio 2013 [facility]- normal coronary arteries     Cataract      CVA (cerebral vascular accident) (H)     admitted at [facility]     on Cymbalta     Diabetes mellitus, type 2 (H)      Diabetic nephropathy (H)      Diabetic neuropathy (H)     severe     Diabetic retinopathy (H)      GERD (gastroesophageal reflux disease)      Hyperlipidemia      Hypertension     ECHO 2013, TDS, NL EF     POAG (primary open-angle glaucoma)     adv BE     Seasonal allergies      Tubular adenoma of colon 2013    repeat colonoscopy in 2018      PAST SURGICAL HISTORY:  has a past surgical history that includes Extracapsular cataract extration with intraocular lens implant (11-10-09, 2-9-10); Colonoscopy (7/15/2013);  section; stent, coronary, lainey (); DaVINCI hysterectomy total, bilateral salpingo-oophorectomy, combined (N/A, 2019); Colonoscopy (N/A, 2020); Coronary Stent Placement (2004); Cardiac catheterization; Hysterectomy total abdominal, bilateral salpingo-oophorectomy, combined (2019); Cataract Extraction W/ Intraocular Lens Implant (Bilateral); and  Section.  IMMUNIZATIONS:  Immunization History   Administered Date(s) Administered     COVID-19,PF,Moderna 2020, 2021, 2021     FLU 6-35 months 2009, 2010     Flu, Unspecified 10/26/2016, 10/12/2017, 10/15/2018, 10/11/2019, 10/22/2020     Influenza (High Dose) 3 valent vaccine 10/15/2018, 10/11/2019, 10/22/2020, 10/18/2021     Influenza (IIV3) PF 2003, 11/10/2005, 10/19/2006, 2009, 10/24/2011, 2012, 10/21/2015     Influenza Vaccine IM > 6 months Valent IIV4 (Alfuria,Fluzone) 2014     Influenza Vaccine IM Ages 6-35 Months 4 Valent (PF) 10/18/2021     Pneumococcal (PCV 7) 2003     Pneumococcal 23 valent 10/24/2011     TD (ADULT, 7+) 2003     TDAP Vaccine (Boostrix) 2014     Tdap (Adacel,Boostrix) 2014     Zoster vaccine, live 2013           Current  Outpatient Medications:      acetaminophen (TYLENOL) 500 MG tablet, Take 1,000 mg by mouth 3 times daily, Disp: , Rfl:      ASPIRIN LOW DOSE 81 MG chewable tablet, CHEW AND SWALLOW 1 TAB BY MOUTH ONCE DAILY IN THE MORNING, Disp: , Rfl: 99     atorvastatin (LIPITOR) 80 MG tablet, Take 1 tablet by mouth daily. Pt needs to have labs done prior to further refills., Disp: 90 tablet, Rfl: 0     bisacodyl (DULCOLAX) 10 MG suppository, Place 10 mg rectally daily as needed for constipation, Disp: , Rfl:      carboxymethylcellulose (REFRESH PLUS) 0.5 % SOLN, 1 drop 3 times daily as needed., Disp: , Rfl:      CHLORTHALIDONE PO, Take 25 mg by mouth every morning , Disp: , Rfl:      diclofenac (VOLTAREN) 1 % topical gel, Apply 2 g topically 4 times daily, Disp: , Rfl:      dorzolamide-timolol 2-0.5 % OP ophthalmic solution, Place 1 drop into both eyes 2 times daily, Disp: 1 Bottle, Rfl: 11     DULoxetine HCl (CYMBALTA PO), Take 90 mg by mouth every morning , Disp: , Rfl:      folic acid (FOLVITE) 1 MG tablet, Take 1 mg by mouth every morning , Disp: , Rfl:      gabapentin (NEURONTIN) 300 MG capsule, Take 400 mg by mouth 3 times daily, Disp: , Rfl:      insulin glargine U-300 (TOUJEO) 300 UNIT/ML (1 units dial) pen, Inject 70 Units Subcutaneous At Bedtime, Disp: 70 mL, Rfl: 3     ketoconazole (NIZORAL) 2 % shampoo, To entire wet scalp and ears and then wash off after 5 minutes three times a week., Disp: 240 mL, Rfl: 11     latanoprost (XALATAN) 0.005 % ophthalmic solution, Place 1 drop into both eyes daily, Disp: , Rfl:      liraglutide (VICTOZA PEN) 18 MG/3ML solution, Inject 1.8 mg Subcutaneous daily, Disp: 9 mL, Rfl: 11     loperamide (IMODIUM A-D) 2 MG tablet, Take 2 mg by mouth 4 times daily as needed for diarrhea, Disp: , Rfl:      loratadine (CLARITIN) 10 MG tablet, Take 10 mg by mouth as needed., Disp: , Rfl:      metFORMIN (GLUCOPHAGE-XR) 500 MG 24 hr tablet, Take 2 tablets (1,000 mg) by mouth daily (with dinner), Disp:  180 tablet, Rfl: 3     metoprolol tartrate (LOPRESSOR) 100 MG tablet, Take 100 mg by mouth 2 times daily, Disp: , Rfl:      nitroGLYCERIN (NITROSTAT) 0.4 MG SL tablet, Place 0.4 mg under the tongue every 5 minutes as needed., Disp: , Rfl:      olopatadine HCl (PATADAY) 0.2 % SOLN, Place 1 drop into both eyes daily as needed For itchy eyes, Disp: 1 Bottle, Rfl: 5     omeprazole (PRILOSEC) 20 MG DR capsule, Take 20 mg by mouth, Disp: , Rfl:      pregabalin (LYRICA) 75 MG capsule, Take 1 capsule (75 mg) by mouth 3 times daily, Disp: 90 capsule, Rfl: 3     senna-docusate (SENOKOT-S/PERICOLACE) 8.6-50 MG tablet, Take 2 tablets by mouth 2 times daily as needed for constipation, Disp: 60 tablet, Rfl: 0     simethicone (MYLICON) 125 MG chewable tablet, Take 125 mg by mouth 4 times daily as needed After meals and HS prn, Disp: , Rfl:      tetrahydrozoline (VISINE) 0.05 % ophthalmic solution, Place 2 drops into both eyes 4 times daily (Patient not taking: Reported on 5/11/2022), Disp: , Rfl:        ROS:  Constitutional: Positive for activity change. Negative for appetite change, chills, fatigue and fever.        Lift for transfers.    HENT: Negative for congestion and sore throat.    Respiratory: Negative for shortness of breath and wheezing.    Cardiovascular: negative for leg swelling . Negative for chest pain. (intermittent Right  upper chest discomfort. She was seen by cardiology and no known cause found.    compression stockings   Gastrointestinal: Negative for abdominal distention, abdominal pain, constipation, diarrhea and nausea.   Genitourinary: Negative for dysuria.   Musculoskeletal: Positive for arthralgias. Negative for myalgias.   Skin: Negative for color change, rash and wound.   Neurological: Positive for numbness. Negative for dizziness and weakness.         neuropathy bilateral hands   Psychiatric/Behavioral: Negative for agitation, behavioral problems and sleep disturbance.     Vitals:  /66   Pulse  "78   Temp 98.5  F (36.9  C)   Resp 18   Ht 1.6 m (5' 3\")   Wt 70.3 kg (155 lb)   SpO2 98%   BMI 27.46 kg/m   Body mass index is 27.46 kg/m .  Exam:   Abdominal: She exhibits distension.  Abdomen is soft  Skin:    healed old lap sites on abdomen     Constitutional: She is oriented to person, place, and time. She appears well-developed and well-nourished.   Pleasant woman in no acute distress.   HENT:   Head: Normocephalic.   Eyes: Conjunctivae are normal.   Neck: Normal range of motion.   Cardiovascular: Normal rate, regular rhythm and normal heart sounds.   No murmur heard.  Pulmonary/Chest: Breath sounds normal. No respiratory distress. She has no wheezes. She has no rales.   Abdominal: Soft. Bowel sounds are normal. She exhibits no distension. There is no tenderness.   Musculoskeletal: She exhibits edema.   Wearing Compression socks   Neurological: She is alert and oriented to person, place, and time.   Neuropathy bilateral hands.   Skin: Skin is warm.   Psychiatric: She has a normal mood and affect. Her behavior is normal    Lab/Diagnostic data:   No results found for this or any previous visit (from the past 240 hour(s)).    ASSESSMENT/PLAN    Nicotine use resident declined any interventions reports she had not had a cigarette in 3 months    Chronic right shoulder pain- Voltaren gel topically QID PRN, Tylenol TID    IDDM. FS are stable last A1C 8.1 on 6/17/2021. Endocrinologist following , Continue current diabetic medications as above.       HTN. Continue  Lisinpril and metoprolol-stable     Neuropathy   On lyrica and gabapentin.     CAD. HX  MI and stent.      S/p Hysterectomy in October 2019     Right knee pain continue pain medications notify provider for any increased changes     Electronically signed by:  Shawna Mesa CNP   "

## 2022-05-11 NOTE — LETTER
5/11/2022        RE: Cristal Preciado  [address] [facility] [address]        [facility]  Chief Complaint   Patient presents with     senior care Acute     [place] Medical Record Number:  [id number]  Place of Service where encounter took place:  [facility] () [[id number]]    HPI:    Cristal Preciado  is a 71 year old  (1950), who is being seen today for an annual comprehensive visit. She resides   in the long-term care facility [facility]. She has been a resident here since October 2011. She does have multiple complex co- morbidities. She is treated for hypertension and has insulin-dependent diabetes mellitus. She has a endocrinologist who follows her  blood sugars regularly.  She has chronic neuropathy, chronic kidney disease and is treated with Cymbalta for depression. She is on gabapentin and lyrica for neuropathy pain. She is on meds for depression.  Does require a lift for transfers. She has chronic weakness in her lower extremities.      Today she is seen for nicotine use.  Resident does go out independently to the stores and it appears she came back with a pack of cigarettes and a lighter.  Writer spoke with patient today and she said they were over 3 weeks old and then reported she has not had a cigarette in 3 months.  She has declined any interventions and did not feel she needed them because she said she was not smoking. She denies chest pain or shortness of breath.  She denies cough or congestion.  She is  non ambulatory, She is on  gabapentin and lyrica for neuropathy.. Her BS are being controlled by her endocrinologist and being sent in every couple of weeks.  She denies cough or congestion.  She does have numbness and pain in her hands.  No open areas on her skin.         ALLERGIES: Dust mites, Food allergy formula, and Pollen extract  PAST MEDICAL HISTORY:   Past Medical History:   Diagnosis Date      AION (anterior ischaemic optic neuropathy), left eye     NAION LE     CAD (coronary artery disease) 3/2009    [facility]; Angio  UM- normal coronary arteries     Cataract      CVA (cerebral vascular accident) (H)     admitted at [facility]     on Cymbalta     Diabetes mellitus, type 2 (H)      Diabetic nephropathy (H)      Diabetic neuropathy (H)     severe     Diabetic retinopathy (H)      GERD (gastroesophageal reflux disease)      Hyperlipidemia      Hypertension     ECHO 2013, TDS, NL EF     POAG (primary open-angle glaucoma)     adv BE     Seasonal allergies      Tubular adenoma of colon 2013    repeat colonoscopy in 2018      PAST SURGICAL HISTORY:  has a past surgical history that includes Extracapsular cataract extration with intraocular lens implant (11-10-09, 2-9-10); Colonoscopy (7/15/2013);  section; stent, coronary, lainey (); DaVINCI hysterectomy total, bilateral salpingo-oophorectomy, combined (N/A, 2019); Colonoscopy (N/A, 2020); Coronary Stent Placement (2004); Cardiac catheterization; Hysterectomy total abdominal, bilateral salpingo-oophorectomy, combined (2019); Cataract Extraction W/ Intraocular Lens Implant (Bilateral); and  Section.  IMMUNIZATIONS:  Immunization History   Administered Date(s) Administered     COVID-19,PF,Moderna 2020, 2021, 2021     FLU 6-35 months 2009, 2010     Flu, Unspecified 10/26/2016, 10/12/2017, 10/15/2018, 10/11/2019, 10/22/2020     Influenza (High Dose) 3 valent vaccine 10/15/2018, 10/11/2019, 10/22/2020, 10/18/2021     Influenza (IIV3) PF 2003, 11/10/2005, 10/19/2006, 2009, 10/24/2011, 2012, 10/21/2015     Influenza Vaccine IM > 6 months Valent IIV4 (Alfuria,Fluzone) 2014     Influenza Vaccine IM Ages 6-35 Months 4 Valent (PF) 10/18/2021     Pneumococcal (PCV 7) 2003     Pneumococcal 23 valent 10/24/2011     TD (ADULT, 7+) 2003     TDAP  Vaccine (Boostrix) 06/12/2014     Tdap (Adacel,Boostrix) 06/12/2014     Zoster vaccine, live 12/12/2013           Current Outpatient Medications:      acetaminophen (TYLENOL) 500 MG tablet, Take 1,000 mg by mouth 3 times daily, Disp: , Rfl:      ASPIRIN LOW DOSE 81 MG chewable tablet, CHEW AND SWALLOW 1 TAB BY MOUTH ONCE DAILY IN THE MORNING, Disp: , Rfl: 99     atorvastatin (LIPITOR) 80 MG tablet, Take 1 tablet by mouth daily. Pt needs to have labs done prior to further refills., Disp: 90 tablet, Rfl: 0     bisacodyl (DULCOLAX) 10 MG suppository, Place 10 mg rectally daily as needed for constipation, Disp: , Rfl:      carboxymethylcellulose (REFRESH PLUS) 0.5 % SOLN, 1 drop 3 times daily as needed., Disp: , Rfl:      CHLORTHALIDONE PO, Take 25 mg by mouth every morning , Disp: , Rfl:      diclofenac (VOLTAREN) 1 % topical gel, Apply 2 g topically 4 times daily, Disp: , Rfl:      dorzolamide-timolol 2-0.5 % OP ophthalmic solution, Place 1 drop into both eyes 2 times daily, Disp: 1 Bottle, Rfl: 11     DULoxetine HCl (CYMBALTA PO), Take 90 mg by mouth every morning , Disp: , Rfl:      folic acid (FOLVITE) 1 MG tablet, Take 1 mg by mouth every morning , Disp: , Rfl:      gabapentin (NEURONTIN) 300 MG capsule, Take 400 mg by mouth 3 times daily, Disp: , Rfl:      insulin glargine U-300 (TOUJEO) 300 UNIT/ML (1 units dial) pen, Inject 70 Units Subcutaneous At Bedtime, Disp: 70 mL, Rfl: 3     ketoconazole (NIZORAL) 2 % shampoo, To entire wet scalp and ears and then wash off after 5 minutes three times a week., Disp: 240 mL, Rfl: 11     latanoprost (XALATAN) 0.005 % ophthalmic solution, Place 1 drop into both eyes daily, Disp: , Rfl:      liraglutide (VICTOZA PEN) 18 MG/3ML solution, Inject 1.8 mg Subcutaneous daily, Disp: 9 mL, Rfl: 11     loperamide (IMODIUM A-D) 2 MG tablet, Take 2 mg by mouth 4 times daily as needed for diarrhea, Disp: , Rfl:      loratadine (CLARITIN) 10 MG tablet, Take 10 mg by mouth as needed., Disp:  , Rfl:      metFORMIN (GLUCOPHAGE-XR) 500 MG 24 hr tablet, Take 2 tablets (1,000 mg) by mouth daily (with dinner), Disp: 180 tablet, Rfl: 3     metoprolol tartrate (LOPRESSOR) 100 MG tablet, Take 100 mg by mouth 2 times daily, Disp: , Rfl:      nitroGLYCERIN (NITROSTAT) 0.4 MG SL tablet, Place 0.4 mg under the tongue every 5 minutes as needed., Disp: , Rfl:      olopatadine HCl (PATADAY) 0.2 % SOLN, Place 1 drop into both eyes daily as needed For itchy eyes, Disp: 1 Bottle, Rfl: 5     omeprazole (PRILOSEC) 20 MG DR capsule, Take 20 mg by mouth, Disp: , Rfl:      pregabalin (LYRICA) 75 MG capsule, Take 1 capsule (75 mg) by mouth 3 times daily, Disp: 90 capsule, Rfl: 3     senna-docusate (SENOKOT-S/PERICOLACE) 8.6-50 MG tablet, Take 2 tablets by mouth 2 times daily as needed for constipation, Disp: 60 tablet, Rfl: 0     simethicone (MYLICON) 125 MG chewable tablet, Take 125 mg by mouth 4 times daily as needed After meals and HS prn, Disp: , Rfl:      tetrahydrozoline (VISINE) 0.05 % ophthalmic solution, Place 2 drops into both eyes 4 times daily (Patient not taking: Reported on 5/11/2022), Disp: , Rfl:        ROS:  Constitutional: Positive for activity change. Negative for appetite change, chills, fatigue and fever.        Lift for transfers.    HENT: Negative for congestion and sore throat.    Respiratory: Negative for shortness of breath and wheezing.    Cardiovascular: negative for leg swelling . Negative for chest pain. (intermittent Right  upper chest discomfort. She was seen by cardiology and no known cause found.    compression stockings   Gastrointestinal: Negative for abdominal distention, abdominal pain, constipation, diarrhea and nausea.   Genitourinary: Negative for dysuria.   Musculoskeletal: Positive for arthralgias. Negative for myalgias.   Skin: Negative for color change, rash and wound.   Neurological: Positive for numbness. Negative for dizziness and weakness.         neuropathy bilateral  "hands   Psychiatric/Behavioral: Negative for agitation, behavioral problems and sleep disturbance.     Vitals:  /66   Pulse 78   Temp 98.5  F (36.9  C)   Resp 18   Ht 1.6 m (5' 3\")   Wt 70.3 kg (155 lb)   SpO2 98%   BMI 27.46 kg/m   Body mass index is 27.46 kg/m .  Exam:   Abdominal: She exhibits distension.  Abdomen is soft  Skin:    healed old lap sites on abdomen     Constitutional: She is oriented to person, place, and time. She appears well-developed and well-nourished.   Pleasant woman in no acute distress.   HENT:   Head: Normocephalic.   Eyes: Conjunctivae are normal.   Neck: Normal range of motion.   Cardiovascular: Normal rate, regular rhythm and normal heart sounds.   No murmur heard.  Pulmonary/Chest: Breath sounds normal. No respiratory distress. She has no wheezes. She has no rales.   Abdominal: Soft. Bowel sounds are normal. She exhibits no distension. There is no tenderness.   Musculoskeletal: She exhibits edema.   Wearing Compression socks   Neurological: She is alert and oriented to person, place, and time.   Neuropathy bilateral hands.   Skin: Skin is warm.   Psychiatric: She has a normal mood and affect. Her behavior is normal    Lab/Diagnostic data:   No results found for this or any previous visit (from the past 240 hour(s)).    ASSESSMENT/PLAN    Nicotine use resident declined any interventions reports she had not had a cigarette in 3 months    Chronic right shoulder pain- Voltaren gel topically QID PRN, Tylenol TID    IDDM. FS are stable last A1C 8.1 on 6/17/2021. Endocrinologist following , Continue current diabetic medications as above.       HTN. Continue  Lisinpril and metoprolol-stable     Neuropathy   On lyrica and gabapentin.     CAD. HX  MI and stent.      S/p Hysterectomy in October 2019     Right knee pain continue pain medications notify provider for any increased changes     Electronically signed by:  Shawna Mesa CNP         Sincerely,        Shawna Mesa CNP      "

## 2022-06-24 NOTE — LETTER
June 24, 2022    ZENY TONEY  C/O KIMBERLY TONEY  [address] [facility] [address]      Dear Zeny:    As a member of [facility] ([facility] ([facility] in [place]), you are provided a care coordinator. I will be your new care coordinator as of 7/1/22. I will be calling you soon to see how you are doing and determine your needs.    If you have any questions, please feel free to call me at [phone]. If you reach my voice mail, please leave a message and your phone number. If you are hearing impaired, please call the [place] Relay at 278 or [phone] ([email or web address] relay service).    I look forward to speaking with you soon.    Sincerely,    MUNIRA Byrne, RN, PHN, [facility]  [phone]  [email or web address] is a health plan that contracts with both Medicare and the [place] Medical Assistance (Medicaid) program to provide benefits of both programs to enrollees. Enrollment in [facility] depends on contract renewal.      [facility]  [id number] DHS Approved ([id number])  [id number] (11/18)

## 2022-06-24 NOTE — PROGRESS NOTES
[facility] Care Coordination Contact    Internal CC change effective 7/1/22.  Mailed member CC Change letter.  Additional tasks to be completed by CMS include: update database & Epic and create member files on [employer].

## 2022-06-28 NOTE — NURSING NOTE
"Chief Complaints and History of Present Illnesses   Patient presents with     Follow Up     Primary open angle glaucoma (POAG) of both eyes     Chief Complaint(s) and History of Present Illness(es)     Follow Up     Comments: Primary open angle glaucoma (POAG) of both eyes              Comments     Pt states no change in VA since last visit  Pt states eyes are very itchy, \"I have to scratch the bottom lid at night to get relief \"  Using:  Latanoprost both eyes at bedtime   Cosopt both eyes twice a day  Pataday as needed  artificial tears 3 times daily  artificial tear ointment at bedtime both eyes    Joy Pinon COT 1:35 PM June 28, 2022                       "

## 2022-06-28 NOTE — PROGRESS NOTES
"  Chief Complaint/Presenting Concern: Here for glaucoma    History of Present Illness:   Cristal Preciado is a 70 year old patient who presents for follow up on glaucoma. Patient complains of itching and excessive tearing in both eyes. No recent change in her vision and no eye pain. Pt states eyes are very itchy, \"I have to scratch the bottom lid at night to get relief.\"    Using:  Latanoprost both eyes at bedtime   Cosopt both eyes twice a day  Pataday as needed  artificial tears 3 times daily  artificial tear ointment at bedtime both eyes    Relevant Past Medical/Family/Social History: CAD, HTN    Relevant Review of Systems: None Relevant      Diagnosis: Primary open angle glaucoma advanced each eye   Previous glaucoma surgery/laser: [place] both eyes, Right eye 9/05, Left eye 5/04  Maximum intraocular pressure unknown  Currently Meds:Latanoprost both eyes at bedtime, Cosopt both eyes twice a day  Meds AEs/intolerance: unknown   Cannot do visual field and OCT with floor effect-would not repeat    Today's testing:  IOP 14/16 with tonopen -- unable to get to the slit lamp today in wheelchair    Additional Ocular History:    2. NAION left eye    3. Pseudophakic each eye   - Right eye 2-9-10  - Left eye 8-14-12    4. Diabetes mellitus-no retinopathy  OCT with no edema today (3/9/31)    5. Allergic conjunctivitis each eye   - using Pataday as needed     Plan/Recommendations:    Discussed findings with patient.    Return every 6 months to make sure tubes still covered (covered with conjunctiva only)    Continue Latanoprost both eyes at bedtime    Continue Cosopt both eyes twice a day    Start scheduled Pataday once daily in both eyes     Continue artificial tears as needed     Patient complains of headache, also reports weight loss. Low suspicion for GCA, will order ESR CRP. Advised to follow up with PCP.     RTC 6 months, v/t/d    Jessica Ramsay MD  PGY-3 Resident Physician  Department of Ophthalmology      Physician " Attestation     Attending Physician Attestation:  Complete documentation of historical and exam elements from today's encounter can be found in the full encounter summary report (not reduplicated in this progress note). I personally obtained the chief complaint(s) and history of present illness. I confirmed and edited as necessary the review of systems, past medical/surgical history, family history, social history, and examination findings as documented by others; and I examined the patient myself. I personally reviewed the relevant tests, images, and reports as documented above. I personally reviewed the ophthalmic test(s) associated with this encounter, agree with the interpretation(s) as documented by the resident/fellow and have edited the corresponding report(s) as necessary. I formulated and edited as necessary the assessment and plan and discussed the findings and management plan with the patient and any family members present at the time of the visit.  April Luis M.D., Glaucoma, June 28, 2022

## 2022-06-29 NOTE — TELEPHONE ENCOUNTER
Rosalva [phone]  option 2    Spoke to Rosalva at [facility]    Labs for Sed rate and CRP done at facility and apart of [facility] and lab results in system.    Cancelled upcoming lab on July 1st    Note to Dr. Mccray/Dr. Ramsay for review of labs/confirming plan of care    Glen Willard RN 2:14 PM 06/29/22            [facility]    Phone Message    May a detailed message be left on voicemail: yes     Reason for Call: Other: Patient had labs done at the [facility], Rosalva is wondering of the patient still needs to have labs done on 7/01/2022 or can they fax the results from the labs that were already done.  Please reach out to discuss. Thank you      Action Taken: Message routed to:  [facility] EYE    Travel Screening: Not Applicable

## 2022-07-08 NOTE — TELEPHONE ENCOUNTER
I spoke with patient this afternoon regarding positive inflammatory markers (CRP). Patient notes no change in vision, no headache, jaw claudication, scalp tenderness, diplopia, fevers, or muscle aches. She has a history of t2dm, gout which could account for her elevated esr. Her esr was elevated 11 years ago as well (48 11 years prior vs 71 at this most recent check).    Discussed with the patient and her nurse to call immediately with any worsening of vision or other gca symptoms.     Patient and nurse were understanding of the plan.     Jimbo Bravo MD  Resident Physician, PGY-3  Department of Ophthalmology  07/07/22 8:55 PM

## 2022-07-21 NOTE — LETTER
7/21/2022        RE: Cristal Preciado  [facility]  [address]  [facility] [address]                  [facility]    [place] Medical Record Number:  [id number]  Place of Service where encounter took place: [facility] () [[id number]]   CODE STATUS:   CPR/Full code     Chief Complaint:  Chief Complaint   Patient presents with     [facility]     LT 7/21/2022. DM, HTN, neuropathy, chronic pain.       HPI:   Cristal is a 72 y.o. female seen for routine physician follow up in LTC at [facility]. She has been a resident here since October 2011. She does have multiple complex co morbidities. She is treated for hypertension and has insulin-dependent diabetes mellitus. She sees an endocrinologist. She has chronic neuropathy, chronic kidney disease and is treated with Cymbalta for depression and chronic pain. She is on gabapentin and lyrica for neuropathy pain. She has chronic weakness in her lower extremities and is wheelchair bound. She has chronic urinary incontinence. Hospitalized in April 2018 for chest pain. It is noted she has coronary artery disease having had PCI with stent placement in 2009. Her chest pain was reproducible on exam. Negative 3 sets of troponin. EKG showed no significant ischemic changes. Pharmacological stress was negative for inducible ischemia so no intervention was required. She has periodic follow up with cardiology.    Today:  Chart, nurses notes, medications reviewed. Patient seen in her room. Diabetes managed by endocrinology. She is on metformin, toujeo and victoza. Accuchecks followed. She has not been ill recently with cough cold or congestion. Has been prone to open areas buttocks, right thigh and gluteal fold, encouraging off loading and using foam bandage. She is wheelchair bound, does not ambulate. Requires maximum assistance from LT staff. She needs assist with cares due to neuropathy and difficulty using  her hands. BPs satisfactory, treated with metoprolol and chlorthalidone for HTN. She is on Cymbalta for mood and also chronic pain along with lyrica.      Past Medical History:  Past Medical History:   Diagnosis Date     AION (anterior ischaemic optic neuropathy), left eye     NAION LE     CAD (coronary artery disease) 3/2009    [facility]; Angio 2013 UM- normal coronary arteries     Cataract      CVA (cerebral vascular accident) (H)     admitted at [facility]     on Cymbalta     Diabetes mellitus, type 2 (H)      Diabetic nephropathy (H)      Diabetic neuropathy (H)     severe     Diabetic retinopathy (H)      GERD (gastroesophageal reflux disease)      Hyperlipidemia      Hypertension     ECHO 2013, TDS, NL EF     POAG (primary open-angle glaucoma)     adv BE     Seasonal allergies      Tubular adenoma of colon 2013    repeat colonoscopy in 2018       Medications:  Current Outpatient Medications   Medication Instructions     acetaminophen (TYLENOL) 1,000 mg, Oral, 3 times daily     aspirin 81 mg, DAILY     atorvastatin (LIPITOR) 80 mg, Bedtime     benzocaine-menthoL (CEPACOL) 15-3.6 mg 1 lozenge, Oral, Every 2 hours PRN     bisacodyL (DULCOLAX) 10 mg, Rectal, Daily PRN     carboxymethylcellulose (REFRESH PLUS) 0.5 % Dpet ophthalmic dropperette 1 drop, Both Eyes, 3 times daily     chlorthalidone (HYGROTEN) 25 mg, Oral, DAILY     dorzolamide-timolol (COSOPT) 22.3-6.8 mg/mL ophthalmic solution 1 drop, 2 times daily     DULoxetine (CYMBALTA) 90 mg, Oral, DAILY     folic acid (FOLVITE) 1 mg, DAILY     gabapentin (NEURONTIN) 300 mg, Oral, 3 times daily   * insulin glargine U-300 conc (TOUJEO MAX U-300 SOLOSTAR) 55 units daily     ketoconazole (NIZORAL) 2 % shampoo 1 application, Topical, 2 times weekly, Apply to damp skin, lather, leave on 5 minutes, and rinse. Apply on Wednesdays and Saturdays.     latanoprost (XALATAN) 0.005 % ophthalmic solution 1 drop, Bedtime     liraglutide (VICTOZA) 1.2 mg,  "DAILY     loratadine (CLARITIN) 10 mg, Daily PRN     metFORMIN (GLUCOPHAGE-XR) 1,000 mg, Oral, QDaily     metoprolol tartrate (LOPRESSOR) 100 mg, Oral, 2 times daily     nitroglycerin (NITROSTAT) 0.4 mg, Every 5 min PRN     olopatadine 0.2 % Drop 1 drop, Ophthalmic, Daily PRN     omeprazole (PRILOSEC) 20 mg, Oral, every morning     pregabalin (LYRICA) 75 mg, Oral, 3 times daily     senna-docusate (SENNOSIDES-DOCUSATE SODIUM) 8.6-50 mg tablet 2 tablets, Oral, 2 times daily PRN     simethicone (MYLICON) 80 mg, Oral, 4 times daily, After meals and at HS      triamcinolone (KENALOG) 0.1 % ointment 1 application, Topical, DAILY, Apply to legs in the morning  And to right ear two times a day prn       Physical Exam:   General: Patient is alert female, no distress.  Vitals:  /65   Pulse 74   Temp 98.2  F (36.8  C)   Resp 18   Ht 1.6 m (5' 3\")   Wt 69.9 kg (154 lb)   SpO2 98%   BMI 27.28 kg/m     HEENT: Head is NCAT. Eyes show no injection or icterus. Nares negative. Oropharynx moist.  CV: Non labored respirations.  Abd: Soft, non tender.    : Deferred.  Extremities: Mild LE edema is noted.  Musculoskeletal: Deformities small joints hands, somewhat puffy, baseline.  Psych: Mood appears good.      Labs:  Component      Latest Ref Rng & Units 1/6/2020 11/15/2021 3/21/2022               Hemoglobin A1C POCT      4.3 - <5.7 % 6.6 (A) 6.4 7.8     Component      Latest Ref Rng & Units 10/28/2021             Sodium      136 - 145 mmol/L 138   Potassium      3.5 - 5.0 mmol/L 3.7   Chloride      98 - 107 mmol/L 103   Carbon Dioxide      22 - 31 mmol/L 22   Anion Gap      5 - 18 mmol/L 13   Glucose      70 - 125 mg/dL 129 (H)   Urea Nitrogen      8 - 22 mg/dL 33 (H)   Creatinine      0.60 - 1.10 mg/dL 1.03   GFR Estimate      >60 mL/min/1.73m2 56 (L)   Calcium      8.5 - 10.5 mg/dL 10.4     Component      Latest Ref Rng & Units 10/28/2021             WBC      4.0 - 11.0 10e3/uL 5.7   RBC Count      3.80 - 5.20 10e6/uL " 3.66 (L)   Hemoglobin      11.7 - 15.7 g/dL 11.3 (L)   Hematocrit      35.0 - 47.0 % 34.7 (L)   MCV      78 - 100 fL 95   MCH      26.5 - 33.0 pg 30.9   MCHC      31.5 - 36.5 g/dL 32.6   RDW      10.0 - 15.0 % 12.9   Platelet Count      150 - 450 10e3/uL 219         Assessment/Plan:  1. Diabetes, IDDM. On toujeo, victoza, metformin, followed and managed by endocrinology.   2. HTN. On metoprolol and chlorthalidone. Bps overall satisfactory.  3. Neuropathy. Chronic pain controlled with gabapentin, lyrica and Cymbalta.  4. Hx of stroke. Wheelchair bound, non ambulatory. On aspirin, statin.  5. Glaucoma. Visual impairment at baseline.  6. Depression. Continue Cymbalta.  7. CKD. Last labs as noted above, reviewed.          Electronically signed by: Meagan Valdez MD             Sincerely,        Meagan Valdez MD

## 2022-08-01 NOTE — PROGRESS NOTES
[facility] Six-Month Assessment    6 month assessment completed on 8/1/2022 with Ciara HOROWITZ, Demetra and point click care.    ER visits: No  Hospitalizations: No  TCU stays: No  Significant health status changes: no change in status  Falls/Injuries: No  ADL/IADL changes: No    Reviewed Institutional Assessment and updated as needed.     Will see member in 6 months for an annual health risk assessment.   Encouraged member to call CC with any questions or concerns in the meantime.     Lois Loera RN  [place]ursing [place] [address]  [email or web address]   [email or web address]     Office: [phone]   Fax: [phone]

## 2022-08-03 NOTE — PROGRESS NOTES
CC attended care conference for member on 8/3/2022 at [facility].   Present at care conference this care coordinator, NH  (Ciara DOMINGUEZ), NH RN (Adela), NH Dietitian (Janice).  OT Report: na  Cognitive testing results: Bims 14/15, mild depression  PT Report:  restorative program, going to bingo and other activities.   Nursing Report: No current medication changes, trying to manage current pain with Tylenol. No skin issues. She is receiving a bath twice a week.   Dietician Report: weight has been stable, no concerns.   Social Work Report: no concerns  CC Report:  No concerns    Lois Loera RN  Care Coordinator-Long Term Care  [address] [address]  [email or web address]   [email or web address]     Office: [phone]   Fax: [phone]

## 2022-08-04 NOTE — PROGRESS NOTES
This patient's medication list and chart were reviewed as part of the service provided by [facility].    Assessment/Recommendations:  1. (HTN, CAD):  Unclear why she is no longer on an ACEi.  Previously on lisinopril.  Has a history of CAD, diabetes, CKD, and microalbuminuria.  Could consider reduction in metoprolol to 75mg twice daily and begin lisinopril 5mg daily, and follow-up BMP in one week.  Monitor blood pressure, heart rate as well.  2. (Neuropathy):  Again note that patient is on both Lyrica and gabapentin.  This is duplicative and not recommended due to increased risk of adverse effects, such as peripheral edema, confusion, sedation, etc.  Is also on duloxetine.  Also of note is that patient is exceeding max recommended dose of gabapentin given her renal function (max recommended daily dose is 900mg).  Please taper and d'c gabapentin and increase Lyrica to 100mg three times daily (max recommended dose based on renal function).  In future, could also trial an increase in duloxetine to 120mg daily and follow-up sodium level (due to potential for SNRI to contribute to low Na).      Estimated CrCl ([place]-Gault) = 48ml/min (based on adjusted BW 59.4 & Scr 1.03)    Tessie Brar, Pharm.D.,[facility] [place]  Board Certified Geriatric Pharmacist  Medication Therapy Management Pharmacist  [phone]    '

## 2022-08-08 NOTE — PROGRESS NOTES
[facility] GERIATRIC SERVICES    [facility] Medical Record Number:  [id number]  Place of Service where encounter took place: [facility] () [[id number]]   CODE STATUS:   CPR/Full code     Chief Complaint:  Chief Complaint   Patient presents with     FDC Regulatory     LTC 7/21/2022. DM, HTN, neuropathy, chronic pain.       HPI:   Cristal is a 72 y.o. female seen for routine physician follow up in LT at [facility]. She has been a resident here since October 2011. She does have multiple complex co morbidities. She is treated for hypertension and has insulin-dependent diabetes mellitus. She sees an endocrinologist. She has chronic neuropathy, chronic kidney disease and is treated with Cymbalta for depression and chronic pain. She is on gabapentin and lyrica for neuropathy pain. She has chronic weakness in her lower extremities and is wheelchair bound. She has chronic urinary incontinence. Hospitalized in April 2018 for chest pain. It is noted she has coronary artery disease having had PCI with stent placement in 2009. Her chest pain was reproducible on exam. Negative 3 sets of troponin. EKG showed no significant ischemic changes. Pharmacological stress was negative for inducible ischemia so no intervention was required. She has periodic follow up with cardiology.    Today:  Chart, nurses notes, medications reviewed. Patient seen in her room. Diabetes managed by endocrinology. She is on metformin, toujeo and victoza. Accuchecks followed. She has not been ill recently with cough cold or congestion. Has been prone to open areas buttocks, right thigh and gluteal fold, encouraging off loading and using foam bandage. She is wheelchair bound, does not ambulate. Requires maximum assistance from LT staff. She needs assist with cares due to neuropathy and difficulty using her hands. BPs satisfactory, treated with metoprolol and chlorthalidone for HTN. She is on Cymbalta for mood and also  chronic pain along with lyrica.      Past Medical History:  Past Medical History:   Diagnosis Date     AION (anterior ischaemic optic neuropathy), left eye     NAION LE     CAD (coronary artery disease) 3/2009    [facility]; Angio 2013 UM- normal coronary arteries     Cataract      CVA (cerebral vascular accident) (H)     admitted at [facility]     on Cymbalta     Diabetes mellitus, type 2 (H)      Diabetic nephropathy (H)      Diabetic neuropathy (H)     severe     Diabetic retinopathy (H)      GERD (gastroesophageal reflux disease)      Hyperlipidemia      Hypertension     ECHO 2013, TDS, NL EF     POAG (primary open-angle glaucoma)     adv BE     Seasonal allergies      Tubular adenoma of colon 2013    repeat colonoscopy in 2018       Medications:  Current Outpatient Medications   Medication Instructions     acetaminophen (TYLENOL) 1,000 mg, Oral, 3 times daily     aspirin 81 mg, DAILY     atorvastatin (LIPITOR) 80 mg, Bedtime     benzocaine-menthoL (CEPACOL) 15-3.6 mg 1 lozenge, Oral, Every 2 hours PRN     bisacodyL (DULCOLAX) 10 mg, Rectal, Daily PRN     carboxymethylcellulose (REFRESH PLUS) 0.5 % Dpet ophthalmic dropperette 1 drop, Both Eyes, 3 times daily     chlorthalidone (HYGROTEN) 25 mg, Oral, DAILY     dorzolamide-timolol (COSOPT) 22.3-6.8 mg/mL ophthalmic solution 1 drop, 2 times daily     DULoxetine (CYMBALTA) 90 mg, Oral, DAILY     folic acid (FOLVITE) 1 mg, DAILY     gabapentin (NEURONTIN) 300 mg, Oral, 3 times daily   * insulin glargine U-300 conc (TOUJEO MAX U-300 SOLOSTAR) 55 units daily     ketoconazole (NIZORAL) 2 % shampoo 1 application, Topical, 2 times weekly, Apply to damp skin, lather, leave on 5 minutes, and rinse. Apply on Wednesdays and Saturdays.     latanoprost (XALATAN) 0.005 % ophthalmic solution 1 drop, Bedtime     liraglutide (VICTOZA) 1.2 mg, DAILY     loratadine (CLARITIN) 10 mg, Daily PRN     metFORMIN (GLUCOPHAGE-XR) 1,000 mg, Oral, QDaily     metoprolol  "tartrate (LOPRESSOR) 100 mg, Oral, 2 times daily     nitroglycerin (NITROSTAT) 0.4 mg, Every 5 min PRN     olopatadine 0.2 % Drop 1 drop, Ophthalmic, Daily PRN     omeprazole (PRILOSEC) 20 mg, Oral, every morning     pregabalin (LYRICA) 75 mg, Oral, 3 times daily     senna-docusate (SENNOSIDES-DOCUSATE SODIUM) 8.6-50 mg tablet 2 tablets, Oral, 2 times daily PRN     simethicone (MYLICON) 80 mg, Oral, 4 times daily, After meals and at HS      triamcinolone (KENALOG) 0.1 % ointment 1 application, Topical, DAILY, Apply to legs in the morning  And to right ear two times a day prn       Physical Exam:   General: Patient is alert female, no distress.  Vitals:  /65   Pulse 74   Temp 98.2  F (36.8  C)   Resp 18   Ht 1.6 m (5' 3\")   Wt 69.9 kg (154 lb)   SpO2 98%   BMI 27.28 kg/m     HEENT: Head is NCAT. Eyes show no injection or icterus. Nares negative. Oropharynx moist.  CV: Non labored respirations.  Abd: Soft, non tender.    : Deferred.  Extremities: Mild LE edema is noted.  Musculoskeletal: Deformities small joints hands, somewhat puffy, baseline.  Psych: Mood appears good.      Labs:  Component      Latest Ref Rng & Units 1/6/2020 11/15/2021 3/21/2022               Hemoglobin A1C POCT      4.3 - <5.7 % 6.6 (A) 6.4 7.8     Component      Latest Ref Rng & Units 10/28/2021             Sodium      136 - 145 mmol/L 138   Potassium      3.5 - 5.0 mmol/L 3.7   Chloride      98 - 107 mmol/L 103   Carbon Dioxide      22 - 31 mmol/L 22   Anion Gap      5 - 18 mmol/L 13   Glucose      70 - 125 mg/dL 129 (H)   Urea Nitrogen      8 - 22 mg/dL 33 (H)   Creatinine      0.60 - 1.10 mg/dL 1.03   GFR Estimate      >60 mL/min/1.73m2 56 (L)   Calcium      8.5 - 10.5 mg/dL 10.4     Component      Latest Ref Rng & Units 10/28/2021             WBC      4.0 - 11.0 10e3/uL 5.7   RBC Count      3.80 - 5.20 10e6/uL 3.66 (L)   Hemoglobin      11.7 - 15.7 g/dL 11.3 (L)   Hematocrit      35.0 - 47.0 % 34.7 (L)   MCV      78 - 100 fL " 95   MCH      26.5 - 33.0 pg 30.9   MCHC      31.5 - 36.5 g/dL 32.6   RDW      10.0 - 15.0 % 12.9   Platelet Count      150 - 450 10e3/uL 219         Assessment/Plan:  1. Diabetes, IDDM. On toujeo, victoza, metformin, followed and managed by endocrinology.   2. HTN. On metoprolol and chlorthalidone. Bps overall satisfactory.  3. Neuropathy. Chronic pain controlled with gabapentin, lyrica and Cymbalta.  4. Hx of stroke. Wheelchair bound, non ambulatory. On aspirin, statin.  5. Glaucoma. Visual impairment at baseline.  6. Depression. Continue Cymbalta.  7. CKD. Last labs as noted above, reviewed.          Electronically signed by: Meagan Valdez MD

## 2022-08-15 NOTE — PROGRESS NOTES
Cristal is a 72 year old who is being evaluated via a billable telephone visit.      What phone number would you like to be contacted at? [phone].   How would you like to obtain your AVS? Mail a copy    Completed check in with Rosalva LOPES.    Outcome for 08/15/22 1:32 PM: Spoke with Jessica, from [facility]. She will fax patient's blood sugar readings over today.   Bridgett De Souza, DANYEL     Start time: 0914  End time: 0928    HPI:   Ms. Preciado is a 73 yo woman here for follow up of type 2 diabetes.  At her previous visit, she was complaining of a low appetite, nausea and not feeling well.  We decided to reduce the Victoza to 1.2 mg and stopped the jardiance (she had yeast infections). She says she is feeling much better and her appetite is good.   Her weight has stabilized.     She ate breakfast today.  2 boiled eggs, maltomeal, OJ, decaf coffee.    Dinner- last night- bowl of cheerios. She didn't like what they had.     She has not been having low blood sugars lately.  She sleeps well at night.     Cristal's current diabetes treatment includes:   Victoza 1.2 mg daily (several years ago)  Metformin 1000 mg daily (added in 2018)  Toujeo 70 units daily (switched from U-500 in 2019)   Her insulin requirements have come down significantly.       Previous treatments:   Jardiance 10 mg daily (started 5/21)- stopped in November, 2021 due to yeast infections.   U500- stopped in 2019 due to reduced insulin requirements.     Recent glucose is as follows:   7:30am- 138-215  11:30am- 250-371  4:30pm- 101-217  8:00pm- 124-245    Rosalva (MOSES) reports Cristal is doing well and she has no particular concerns today.     TSH: 1.05- 3/8/21  ALT: 35  A1c: 6.5%- 11/25/21  Microalbumin: 11/23/21- 4.99    PMH   Type 2 diabetes  Neuropathy   Nephropathy  Stroke 2010- mainly in wheelchair. Would like to start to walk again.    CAD, s/p stent 2009  HTN  Dyslipidemia  Cataracts  Glaucoma  GERD  Pressure sore on bottom.     Family Hx:    Mom- stroke  Dad- cancer  1 of 12 children  2 brothers and 2 sisters-  cancer- unsure of type  1 sister with diabetes, on insulin  5 children  Son- MS, milder  Daughter, April- MS  Other kids- healthy  6 grandchildren    Social Hx:   Ms. Preciado lives at [facility].  She is seeing her family about 1-2 times a month now. Has 5 children, all now living in [facility]. Daughter has MS and is in a nursing home.  6 grandchildren.  Originally from [facility].     Current Medications  Current Outpatient Medications   Medication Sig Dispense Refill     acetaminophen (TYLENOL) 500 MG tablet Take 1,000 mg by mouth 3 times daily       ASPIRIN LOW DOSE 81 MG chewable tablet CHEW AND SWALLOW 1 TAB BY MOUTH ONCE DAILY IN THE MORNING  99     atorvastatin (LIPITOR) 80 MG tablet Take 1 tablet by mouth daily. Pt needs to have labs done prior to further refills. 90 tablet 0     bisacodyl (DULCOLAX) 10 MG suppository Place 10 mg rectally daily as needed for constipation       carboxymethylcellulose (REFRESH PLUS) 0.5 % SOLN 1 drop 3 times daily as needed.       CHLORTHALIDONE PO Take 25 mg by mouth every morning        diclofenac (VOLTAREN) 1 % topical gel Apply 2 g topically 4 times daily       dorzolamide-timolol (COSOPT) 2-0.5 % ophthalmic solution Place 1 drop into both eyes 2 times daily 10 mL 3     DULoxetine HCl (CYMBALTA PO) Take 90 mg by mouth every morning        folic acid (FOLVITE) 1 MG tablet Take 1 mg by mouth every morning        gabapentin (NEURONTIN) 300 MG capsule Take 400 mg by mouth 3 times daily       insulin glargine U-300 (TOUJEO) 300 UNIT/ML (1 units dial) pen Inject 70 Units Subcutaneous At Bedtime 70 mL 3     ketoconazole (NIZORAL) 2 % shampoo To entire wet scalp and ears and then wash off after 5 minutes three times a week. 240 mL 11     latanoprost (XALATAN) 0.005 % ophthalmic solution Place 1 drop into both eyes At Bedtime 5 mL 2     liraglutide (VICTOZA PEN) 18 MG/3ML solution  Inject 1.8 mg Subcutaneous daily 9 mL 11     loperamide (IMODIUM A-D) 2 MG tablet Take 2 mg by mouth 4 times daily as needed for diarrhea       loratadine (CLARITIN) 10 MG tablet Take 10 mg by mouth as needed.       metFORMIN (GLUCOPHAGE-XR) 500 MG 24 hr tablet Take 2 tablets (1,000 mg) by mouth daily (with dinner) 180 tablet 3     metoprolol tartrate (LOPRESSOR) 100 MG tablet Take 100 mg by mouth 2 times daily       nitroGLYCERIN (NITROSTAT) 0.4 MG SL tablet Place 0.4 mg under the tongue every 5 minutes as needed.       olopatadine (PATADAY) 0.2 % ophthalmic solution Place 1 drop into both eyes daily For itchy eyes 2.5 mL 3     omeprazole (PRILOSEC) 20 MG DR capsule Take 20 mg by mouth       pregabalin (LYRICA) 75 MG capsule Take 1 capsule (75 mg) by mouth 3 times daily 90 capsule 3     senna-docusate (SENOKOT-S/PERICOLACE) 8.6-50 MG tablet Take 2 tablets by mouth 2 times daily as needed for constipation 60 tablet 0     simethicone (MYLICON) 125 MG chewable tablet Take 125 mg by mouth 4 times daily as needed After meals and HS prn       tetrahydrozoline (VISINE) 0.05 % ophthalmic solution Place 2 drops into both eyes 4 times daily (Patient not taking: No sig reported)       Past Medical History:   Diagnosis Date     AION (anterior ischaemic optic neuropathy), left eye     NAION LE     CAD (coronary artery disease) 3/2009    [facility]; Angio 2013 [facility]- normal coronary arteries     Cataract      CVA (cerebral vascular accident) (H)     admitted at [facility]     on Cymbalta     Diabetes mellitus, type 2 (H)      Diabetic nephropathy (H)      Diabetic neuropathy (H)     severe     Diabetic retinopathy (H)      GERD (gastroesophageal reflux disease)      Hyperlipidemia      Hypertension     ECHO 2013, TDS, NL EF     POAG (primary open-angle glaucoma)     adv BE     Seasonal allergies      Tubular adenoma of colon 2013    repeat colonoscopy in 2018       Past Surgical History:   Procedure Laterality  Date     CARDIAC CATHETERIZATION       CATARACT EXTRACTION W/ INTRAOCULAR LENS IMPLANT Bilateral       SECTION        SECTION       COLONOSCOPY  7/15/2013    Tubular adenoma; repeat in 2018;Procedure: COMBINED COLONOSCOPY, SINGLE BIOPSY/POLYPECTOMY BY BIOPSY;;  Surgeon: Don King MD;  Tubular adenoma     COLONOSCOPY N/A 2020    Procedure: COLONOSCOPY;  Surgeon: Sid Amanda MD;  Location: [facility] GI     CORONARY STENT PLACEMENT  2004    RCA     DAVINCI HYSTERECTOMY TOTAL, BILATERAL SALPINGO-OOPHORECTOMY, COMBINED N/A 2019    Procedure: DaVinci Assisted Total Laparoscopic Hysterectomy, Removal Of Both Tubes And Ovaries;  Surgeon: Linh Cardoso MD;  Location: [facility] OR     EXTRACAPSULAR CATARACT EXTRATION WITH INTRAOCULAR LENS IMPLANT  11-10-09, 2-9-10    11-10-09 Lt, 2-9-10 Rt; Left eye 2012     HYSTERECTOMY TOTAL ABDOMINAL, BILATERAL SALPINGO-OOPHORECTOMY, COMBINED  2019    Procedure: DaVinci Assisted Total Laparoscopic Hysterectomy, Removal Of Both Tubes And Ovaries; Surgeon: Linh Cardoso MD; Location: [facility] OR       STENT, CORONARY, DELORES      RCA       Family History   Problem Relation Age of Onset     Hypertension Mother      Cerebrovascular Disease Mother      Glaucoma Father      Cancer Father      Diabetes Sister      Glaucoma Sister      Cancer - colorectal Other      Cerebrovascular Disease Other      Skin Cancer No family hx of      Melanoma No family hx of      Anesthesia Reaction No family hx of      Deep Vein Thrombosis (DVT) No family hx of      Arthritis Brother      Diabetes Sister      Glaucoma Sister        Social History     Socioeconomic History     Marital status: Single     Spouse name: Not on file     Number of children: Not on file     Years of education: Not on file     Highest education level: Not on file   Occupational History     Not on file   Social Needs     Financial resource strain: Not on file     Food insecurity:      Worry: Not on file     Inability: Not on file     Transportation needs:     Medical: Not on file     Non-medical: Not on file   Tobacco Use     Smoking status: Former Smoker     Packs/day: 1.50     Years: 45.00     Pack years: 67.50     Types: Cigarettes     Start date: 1968     Last attempt to quit: 3/6/2013     Years since quittin.4     Smokeless tobacco: Never Used   Substance and Sexual Activity     Alcohol use: Not Currently     Drug use: Never     Sexual activity: Not Currently   Lifestyle     Physical activity:     Days per week: Not on file     Minutes per session: Not on file     Stress: Not on file   Relationships     Social connections:     Talks on phone: Not on file     Gets together: Not on file     Attends Christianity service: Not on file     Active member of club or organization: Not on file     Attends meetings of clubs or organizations: Not on file     Relationship status: Not on file     Intimate partner violence:     Fear of current or ex partner: Not on file     Emotionally abused: Not on file     Physically abused: Not on file     Forced sexual activity: Not on file   Other Topics Concern     Parent/sibling w/ CABG, MI or angioplasty before 65F 55M? Not Asked   Social History Narrative    Pt has three daughters and two sons, single.  Her daughter Court, see's her often at the Nursing Home ([facility]).  Moved into Nursing Home in 2011 after a hospitalization for ALY.  Moved from [place] in  to [facility]. Has 5 grandchildren.       Physical Exam  GENERAL: healthy, alert and no distress.  A little hard of hearing on the phone.   RESP: no audible wheeze, cough. Able to speak fully in complete sentences.  PSYCH: mentation appears normal, affect normal/bright, judgement and insight intact, normal speech and appearance well-groomed      RESULTS  Lab Results   Component Value Date    A1C 8.1 (H) 2021    A1C 6.5 (H) 2020    A1C 6.9 (H) 2020    A1C 6.6 (H)  07/23/2019    A1C 7.0 (H) 01/10/2019    A1C 8.3 (H) 09/07/2018    A1C 7.5 (H) 10/24/2016    A1C 7.5 (H) 10/12/2015    A1C 8.1 (H) 03/06/2014    A1C 7.2 (H) 03/07/2013    HEMOGLOBINA1 7.8 03/21/2022    HEMOGLOBINA1 6.4 11/15/2021    HEMOGLOBINA1 6.6 (A) 01/06/2020    HEMOGLOBINA1 6.5 (A) 05/24/2019    HEMOGLOBINA1 7.1 (A) 10/10/2018       TSH   Date Value Ref Range Status   03/08/2021 1.05 0.30 - 5.00 uIU/mL Final   01/07/2020 1.04 0.30 - 5.00 uIU/mL Final   08/27/2019 1.32 0.30 - 5.00 uIU/mL Final   10/24/2016 1.20 0.40 - 4.00 mU/L Final   10/12/2015 1.18 0.40 - 4.00 mU/L Final   08/21/2014 1.24 0.40 - 4.00 mU/L Final     Comment:     Effective 7/30/2014, the reference range for this assay has changed to reflect   new instrumentation/methodology.     08/05/2013 1.12 0.4 - 5.0 mU/L Final   07/15/2010 0.53 0.4 - 5.0 mU/L Final     T4 Total   Date Value Ref Range Status   10/30/2008 10.6 5.0 - 11.0 ug/dL Final     T4 Free   Date Value Ref Range Status   04/21/2006 1.04 0.70 - 1.85 ng/dL Final   09/23/2005 1.58 0.70 - 1.85 ng/dL Final       ALT   Date Value Ref Range Status   03/08/2021 35 0 - 45 U/L Final   11/27/2020 25 0 - 45 U/L Final   10/12/2015 39 0 - 50 U/L Final   03/06/2014 36 0 - 50 U/L Final   ]    Recent Labs   Lab Test 11/18/21  0544 11/27/20  0456 04/15/15  0620 08/21/14  0935   CHOL 92 103   < > 110   HDL 26* 32*   < > 43*   LDL 30 43   < > 43   TRIG 180* 138   < > 122   CHOLHDLRATIO  --   --   --  2.6    < > = values in this interval not displayed.       Lab Results   Component Value Date     10/28/2021     08/06/2019      Lab Results   Component Value Date    POTASSIUM 3.7 10/28/2021    POTASSIUM 4.8 08/06/2019     Lab Results   Component Value Date    CHLORIDE 103 10/28/2021    CHLORIDE 104 08/06/2019     Lab Results   Component Value Date    OLAF 10.4 10/28/2021    OLAF 8.8 08/06/2019     Lab Results   Component Value Date    CO2 22 10/28/2021    CO2 19 08/06/2019     Lab Results    Component Value Date    BUN 33 10/28/2021    BUN 24 08/06/2019     Lab Results   Component Value Date    CR 1.03 10/28/2021    CR 0.98 08/06/2019       GFR Estimate   Date Value Ref Range Status   10/28/2021 56 (L) >60 mL/min/1.73m2 Final     Comment:     As of July 11, 2021, eGFR is calculated by the CKD-EPI creatinine equation, without race adjustment. eGFR can be influenced by muscle mass, exercise, and diet. The reported eGFR is an estimation only and is only applicable if the renal function is stable.   06/17/2021 47 (L) >60 mL/min/1.73m2 Final   11/23/2020 >60 >60 mL/min/1.73m2 Final   09/27/2019 44 (L) >60 mL/min/1.73m2 Final   08/06/2019 59 (L) >60 mL/min/[1.73_m2] Final     Comment:     Non  GFR Calc  Starting 12/18/2018, serum creatinine based estimated GFR (eGFR) will be   calculated using the Chronic Kidney Disease Epidemiology Collaboration   (CKD-EPI) equation.     08/05/2019 50 (L) >60 mL/min/[1.73_m2] Final     Comment:     Non  GFR Calc  Starting 12/18/2018, serum creatinine based estimated GFR (eGFR) will be   calculated using the Chronic Kidney Disease Epidemiology Collaboration   (CKD-EPI) equation.     07/23/2019 48 (L) >60 mL/min/[1.73_m2] Final     Comment:     Non  GFR Calc  Starting 12/18/2018, serum creatinine based estimated GFR (eGFR) will be   calculated using the Chronic Kidney Disease Epidemiology Collaboration   (CKD-EPI) equation.       GFR Estimate If Black   Date Value Ref Range Status   06/17/2021 57 (L) >60 mL/min/1.73m2 Final   11/23/2020 >60 >60 mL/min/1.73m2 Final   09/27/2019 53 (L) >60 mL/min/1.73m2 Final   08/06/2019 69 >60 mL/min/[1.73_m2] Final     Comment:      GFR Calc  Starting 12/18/2018, serum creatinine based estimated GFR (eGFR) will be   calculated using the Chronic Kidney Disease Epidemiology Collaboration   (CKD-EPI) equation.     08/05/2019 58 (L) >60 mL/min/[1.73_m2] Final     Comment:       GFR Calc  Starting 12/18/2018, serum creatinine based estimated GFR (eGFR) will be   calculated using the Chronic Kidney Disease Epidemiology Collaboration   (CKD-EPI) equation.     07/23/2019 56 (L) >60 mL/min/[1.73_m2] Final     Comment:      GFR Calc  Starting 12/18/2018, serum creatinine based estimated GFR (eGFR) will be   calculated using the Chronic Kidney Disease Epidemiology Collaboration   (CKD-EPI) equation.         Lab Results   Component Value Date    MICROL <5 10/12/2015     No results found for: MICROALBUMIN  No results found for: CPEPT, GADAB, ISCAB    Vitamin B12   Date Value Ref Range Status   11/27/2020 340 213 - 816 pg/mL Final   08/31/2020 260 213 - 816 pg/mL Final   08/05/2013 506 >210 pg/mL Final     Comment:     Interp: 247-911 = Normal   ]    Most recent eye exam date: : Not Found       Assessment/Plan:      1.  Type 2 diabetes-  Ms. Patton glucose is overall well controlled.  Will check a1c. She is tolerating current treatment regimen, so we will make no changes. Nurse will let me know if she has hypoglycemia.  Glad she is feeling better.      2.  Risk factors- BP is historically well controlled.  She does have microalbuminuria (54.1 11/23/20).  Unable to tolerate jardiance (yeast infections). She is on gabapentin and cymbalta for neuropathy. GFR is stable.  TSH normal in January, 2020. LDL 30.     3. F/U in 3 months with me, sooner with concerns.        22  minutes spent on the date of the encounter doing chart review, review of test results, review and interpretation of glucose data, patient visit and documentation, counseling/coordination of care, and discussion of follow up plan for worsening hyper and hypoglycemia.  The patient understood and is satisfied with today's visit.     Renetta Washington PA-C, MPAS   [facility]  Department of Medicine  Division of Endocrinology and Diabetes

## 2022-08-16 NOTE — LETTER
8/16/2022       RE: Cristal Preciado  [facility]  [address] [facility] [address]     Dear Colleague,    Thank you for referring your patient, Cristal Preciado, to the [facility]OCR[facility] [place] at [facility]. Please see a copy of my visit note below.    Cristal is a 72 year old who is being evaluated via a billable telephone visit.      What phone number would you like to be contacted at? [phone].   How would you like to obtain your AVS? Mail a copy    Completed check in with Rosalva LOPES.    Outcome for 08/15/22 1:32 PM: Spoke with Jessica, from [facility]. She will fax patient's blood sugar readings over today.   DANYEL Hernandez     Start time: 0914  End time: 0928    HPI:   Ms. Preciado is a 73 yo woman here for follow up of type 2 diabetes.  At her previous visit, she was complaining of a low appetite, nausea and not feeling well.  We decided to reduce the Victoza to 1.2 mg and stopped the jardiance (she had yeast infections). She says she is feeling much better and her appetite is good.   Her weight has stabilized.     She ate breakfast today.  2 boiled eggs, maltomeal, OJ, decaf coffee.    Dinner- last night- bowl of cheerios. She didn't like what they had.     She has not been having low blood sugars lately.  She sleeps well at night.     Cristal's current diabetes treatment includes:   Victoza 1.2 mg daily (several years ago)  Metformin 1000 mg daily (added in 2018)  Toujeo 70 units daily (switched from U-500 in 2019)   Her insulin requirements have come down significantly.       Previous treatments:   Jardiance 10 mg daily (started 5/21)- stopped in November, 2021 due to yeast infections.   U500- stopped in 2019 due to reduced insulin requirements.     Recent glucose is as follows:   7:30am- 138-215  11:30am- 250-371  4:30pm- 101-217  8:00pm- 124-245    Rosalva (MOSES) reports Cristal is doing well and  she has no particular concerns today.     TSH: 1.05- 3/8/21  ALT: 35  A1c: 6.5%- 21  Microalbumin: 21- 4.99    PMH   Type 2 diabetes  Neuropathy   Nephropathy  Stroke - mainly in wheelchair. Would like to start to walk again.    CAD, s/p stent   HTN  Dyslipidemia  Cataracts  Glaucoma  GERD  Pressure sore on bottom.     Family Hx:   Mom- stroke  Dad- cancer  1 of 12 children  2 brothers and 2 sisters-  cancer- unsure of type  1 sister with diabetes, on insulin  5 children  Son- MS, milder  Daughter, April- MS  Other kids- healthy  6 grandchildren    Social Hx:   Ms. Preciado lives at [facility].  She is seeing her family about 1-2 times a month now. Has 5 children, all now living in [facility]. Daughter has MS and is in a nursing home.  6 grandchildren.  Originally from [place].     Current Medications  Current Outpatient Medications   Medication Sig Dispense Refill     acetaminophen (TYLENOL) 500 MG tablet Take 1,000 mg by mouth 3 times daily       ASPIRIN LOW DOSE 81 MG chewable tablet CHEW AND SWALLOW 1 TAB BY MOUTH ONCE DAILY IN THE MORNING  99     atorvastatin (LIPITOR) 80 MG tablet Take 1 tablet by mouth daily. Pt needs to have labs done prior to further refills. 90 tablet 0     bisacodyl (DULCOLAX) 10 MG suppository Place 10 mg rectally daily as needed for constipation       carboxymethylcellulose (REFRESH PLUS) 0.5 % SOLN 1 drop 3 times daily as needed.       CHLORTHALIDONE PO Take 25 mg by mouth every morning        diclofenac (VOLTAREN) 1 % topical gel Apply 2 g topically 4 times daily       dorzolamide-timolol (COSOPT) 2-0.5 % ophthalmic solution Place 1 drop into both eyes 2 times daily 10 mL 3     DULoxetine HCl (CYMBALTA PO) Take 90 mg by mouth every morning        folic acid (FOLVITE) 1 MG tablet Take 1 mg by mouth every morning        gabapentin (NEURONTIN) 300 MG capsule Take 400 mg by mouth 3 times daily       insulin glargine U-300 (TOUJEO) 300 UNIT/ML  (1 units dial) pen Inject 70 Units Subcutaneous At Bedtime 70 mL 3     ketoconazole (NIZORAL) 2 % shampoo To entire wet scalp and ears and then wash off after 5 minutes three times a week. 240 mL 11     latanoprost (XALATAN) 0.005 % ophthalmic solution Place 1 drop into both eyes At Bedtime 5 mL 2     liraglutide (VICTOZA PEN) 18 MG/3ML solution Inject 1.8 mg Subcutaneous daily 9 mL 11     loperamide (IMODIUM A-D) 2 MG tablet Take 2 mg by mouth 4 times daily as needed for diarrhea       loratadine (CLARITIN) 10 MG tablet Take 10 mg by mouth as needed.       metFORMIN (GLUCOPHAGE-XR) 500 MG 24 hr tablet Take 2 tablets (1,000 mg) by mouth daily (with dinner) 180 tablet 3     metoprolol tartrate (LOPRESSOR) 100 MG tablet Take 100 mg by mouth 2 times daily       nitroGLYCERIN (NITROSTAT) 0.4 MG SL tablet Place 0.4 mg under the tongue every 5 minutes as needed.       olopatadine (PATADAY) 0.2 % ophthalmic solution Place 1 drop into both eyes daily For itchy eyes 2.5 mL 3     omeprazole (PRILOSEC) 20 MG DR capsule Take 20 mg by mouth       pregabalin (LYRICA) 75 MG capsule Take 1 capsule (75 mg) by mouth 3 times daily 90 capsule 3     senna-docusate (SENOKOT-S/PERICOLACE) 8.6-50 MG tablet Take 2 tablets by mouth 2 times daily as needed for constipation 60 tablet 0     simethicone (MYLICON) 125 MG chewable tablet Take 125 mg by mouth 4 times daily as needed After meals and HS prn       tetrahydrozoline (VISINE) 0.05 % ophthalmic solution Place 2 drops into both eyes 4 times daily (Patient not taking: No sig reported)       Past Medical History:   Diagnosis Date     AION (anterior ischaemic optic neuropathy), left eye     NAION LE     CAD (coronary artery disease) 3/2009    [facility]; Angio 2013 UM- normal coronary arteries     Cataract      CVA (cerebral vascular accident) (H)     admitted at [facility]     on Cymbalta     Diabetes mellitus, type 2 (H)      Diabetic nephropathy (H)      Diabetic  neuropathy (H)     severe     Diabetic retinopathy (H)      GERD (gastroesophageal reflux disease)      Hyperlipidemia      Hypertension     ECHO 2013, TDS, NL EF     POAG (primary open-angle glaucoma)     adv BE     Seasonal allergies      Tubular adenoma of colon     repeat colonoscopy in        Past Surgical History:   Procedure Laterality Date     CARDIAC CATHETERIZATION       CATARACT EXTRACTION W/ INTRAOCULAR LENS IMPLANT Bilateral       SECTION        SECTION       COLONOSCOPY  7/15/2013    Tubular adenoma; repeat in 2018;Procedure: COMBINED COLONOSCOPY, SINGLE BIOPSY/POLYPECTOMY BY BIOPSY;;  Surgeon: Don King MD;  Tubular adenoma     COLONOSCOPY N/A 2020    Procedure: COLONOSCOPY;  Surgeon: Sid Amanda MD;  Location: [facility] GI     CORONARY STENT PLACEMENT  2004    RCA     DAVINCI HYSTERECTOMY TOTAL, BILATERAL SALPINGO-OOPHORECTOMY, COMBINED N/A 2019    Procedure: DaVinci Assisted Total Laparoscopic Hysterectomy, Removal Of Both Tubes And Ovaries;  Surgeon: Linh Cardoso MD;  Location: [facility] OR     EXTRACAPSULAR CATARACT EXTRATION WITH INTRAOCULAR LENS IMPLANT  11-10-09, 2-9-10    11-10-09 Lt, 2-9-10 Rt; Left eye 2012     HYSTERECTOMY TOTAL ABDOMINAL, BILATERAL SALPINGO-OOPHORECTOMY, COMBINED  2019    Procedure: DaVinci Assisted Total Laparoscopic Hysterectomy, Removal Of Both Tubes And Ovaries; Surgeon: Linh Cardoso MD; Location: [facility] OR       STENT, CORONARY, DELORES      RCA       Family History   Problem Relation Age of Onset     Hypertension Mother      Cerebrovascular Disease Mother      Glaucoma Father      Cancer Father      Diabetes Sister      Glaucoma Sister      Cancer - colorectal Other      Cerebrovascular Disease Other      Skin Cancer No family hx of      Melanoma No family hx of      Anesthesia Reaction No family hx of      Deep Vein Thrombosis (DVT) No family hx of      Arthritis Brother      Diabetes Sister       Glaucoma Sister        Social History     Socioeconomic History     Marital status: Single     Spouse name: Not on file     Number of children: Not on file     Years of education: Not on file     Highest education level: Not on file   Occupational History     Not on file   Social Needs     Financial resource strain: Not on file     Food insecurity:     Worry: Not on file     Inability: Not on file     Transportation needs:     Medical: Not on file     Non-medical: Not on file   Tobacco Use     Smoking status: Former Smoker     Packs/day: 1.50     Years: 45.00     Pack years: 67.50     Types: Cigarettes     Start date: 1968     Last attempt to quit: 3/6/2013     Years since quittin.4     Smokeless tobacco: Never Used   Substance and Sexual Activity     Alcohol use: Not Currently     Drug use: Never     Sexual activity: Not Currently   Lifestyle     Physical activity:     Days per week: Not on file     Minutes per session: Not on file     Stress: Not on file   Relationships     Social connections:     Talks on phone: Not on file     Gets together: Not on file     Attends Religion service: Not on file     Active member of club or organization: Not on file     Attends meetings of clubs or organizations: Not on file     Relationship status: Not on file     Intimate partner violence:     Fear of current or ex partner: Not on file     Emotionally abused: Not on file     Physically abused: Not on file     Forced sexual activity: Not on file   Other Topics Concern     Parent/sibling w/ CABG, MI or angioplasty before 65F 55M? Not Asked   Social History Narrative    Pt has three daughters and two sons, single.  Her daughter Court, see's her often at the Nursing Home ([facility]).  Moved into Nursing Home in 2011 after a hospitalization for ALY.  Moved from [place] in  to hospitals. Has 5 grandchildren.       Physical Exam  GENERAL: healthy, alert and no distress.  A little hard of hearing on the  phone.   RESP: no audible wheeze, cough. Able to speak fully in complete sentences.  PSYCH: mentation appears normal, affect normal/bright, judgement and insight intact, normal speech and appearance well-groomed      RESULTS  Lab Results   Component Value Date    A1C 8.1 (H) 06/17/2021    A1C 6.5 (H) 11/27/2020    A1C 6.9 (H) 03/18/2020    A1C 6.6 (H) 07/23/2019    A1C 7.0 (H) 01/10/2019    A1C 8.3 (H) 09/07/2018    A1C 7.5 (H) 10/24/2016    A1C 7.5 (H) 10/12/2015    A1C 8.1 (H) 03/06/2014    A1C 7.2 (H) 03/07/2013    HEMOGLOBINA1 7.8 03/21/2022    HEMOGLOBINA1 6.4 11/15/2021    HEMOGLOBINA1 6.6 (A) 01/06/2020    HEMOGLOBINA1 6.5 (A) 05/24/2019    HEMOGLOBINA1 7.1 (A) 10/10/2018       TSH   Date Value Ref Range Status   03/08/2021 1.05 0.30 - 5.00 uIU/mL Final   01/07/2020 1.04 0.30 - 5.00 uIU/mL Final   08/27/2019 1.32 0.30 - 5.00 uIU/mL Final   10/24/2016 1.20 0.40 - 4.00 mU/L Final   10/12/2015 1.18 0.40 - 4.00 mU/L Final   08/21/2014 1.24 0.40 - 4.00 mU/L Final     Comment:     Effective 7/30/2014, the reference range for this assay has changed to reflect   new instrumentation/methodology.     08/05/2013 1.12 0.4 - 5.0 mU/L Final   07/15/2010 0.53 0.4 - 5.0 mU/L Final     T4 Total   Date Value Ref Range Status   10/30/2008 10.6 5.0 - 11.0 ug/dL Final     T4 Free   Date Value Ref Range Status   04/21/2006 1.04 0.70 - 1.85 ng/dL Final   09/23/2005 1.58 0.70 - 1.85 ng/dL Final       ALT   Date Value Ref Range Status   03/08/2021 35 0 - 45 U/L Final   11/27/2020 25 0 - 45 U/L Final   10/12/2015 39 0 - 50 U/L Final   03/06/2014 36 0 - 50 U/L Final   ]    Recent Labs   Lab Test 11/18/21  0544 11/27/20  0456 04/15/15  0620 08/21/14  0935   CHOL 92 103   < > 110   HDL 26* 32*   < > 43*   LDL 30 43   < > 43   TRIG 180* 138   < > 122   CHOLHDLRATIO  --   --   --  2.6    < > = values in this interval not displayed.       Lab Results   Component Value Date     10/28/2021     08/06/2019      Lab Results    Component Value Date    POTASSIUM 3.7 10/28/2021    POTASSIUM 4.8 08/06/2019     Lab Results   Component Value Date    CHLORIDE 103 10/28/2021    CHLORIDE 104 08/06/2019     Lab Results   Component Value Date    OLAF 10.4 10/28/2021    OLAF 8.8 08/06/2019     Lab Results   Component Value Date    CO2 22 10/28/2021    CO2 19 08/06/2019     Lab Results   Component Value Date    BUN 33 10/28/2021    BUN 24 08/06/2019     Lab Results   Component Value Date    CR 1.03 10/28/2021    CR 0.98 08/06/2019       GFR Estimate   Date Value Ref Range Status   10/28/2021 56 (L) >60 mL/min/1.73m2 Final     Comment:     As of July 11, 2021, eGFR is calculated by the CKD-EPI creatinine equation, without race adjustment. eGFR can be influenced by muscle mass, exercise, and diet. The reported eGFR is an estimation only and is only applicable if the renal function is stable.   06/17/2021 47 (L) >60 mL/min/1.73m2 Final   11/23/2020 >60 >60 mL/min/1.73m2 Final   09/27/2019 44 (L) >60 mL/min/1.73m2 Final   08/06/2019 59 (L) >60 mL/min/[1.73_m2] Final     Comment:     Non  GFR Calc  Starting 12/18/2018, serum creatinine based estimated GFR (eGFR) will be   calculated using the Chronic Kidney Disease Epidemiology Collaboration   (CKD-EPI) equation.     08/05/2019 50 (L) >60 mL/min/[1.73_m2] Final     Comment:     Non  GFR Calc  Starting 12/18/2018, serum creatinine based estimated GFR (eGFR) will be   calculated using the Chronic Kidney Disease Epidemiology Collaboration   (CKD-EPI) equation.     07/23/2019 48 (L) >60 mL/min/[1.73_m2] Final     Comment:     Non  GFR Calc  Starting 12/18/2018, serum creatinine based estimated GFR (eGFR) will be   calculated using the Chronic Kidney Disease Epidemiology Collaboration   (CKD-EPI) equation.       GFR Estimate If Black   Date Value Ref Range Status   06/17/2021 57 (L) >60 mL/min/1.73m2 Final   11/23/2020 >60 >60 mL/min/1.73m2 Final   09/27/2019 53  (L) >60 mL/min/1.73m2 Final   08/06/2019 69 >60 mL/min/[1.73_m2] Final     Comment:      GFR Calc  Starting 12/18/2018, serum creatinine based estimated GFR (eGFR) will be   calculated using the Chronic Kidney Disease Epidemiology Collaboration   (CKD-EPI) equation.     08/05/2019 58 (L) >60 mL/min/[1.73_m2] Final     Comment:      GFR Calc  Starting 12/18/2018, serum creatinine based estimated GFR (eGFR) will be   calculated using the Chronic Kidney Disease Epidemiology Collaboration   (CKD-EPI) equation.     07/23/2019 56 (L) >60 mL/min/[1.73_m2] Final     Comment:      GFR Calc  Starting 12/18/2018, serum creatinine based estimated GFR (eGFR) will be   calculated using the Chronic Kidney Disease Epidemiology Collaboration   (CKD-EPI) equation.         Lab Results   Component Value Date    MICROL <5 10/12/2015     No results found for: MICROALBUMIN  No results found for: CPEPT, GADAB, ISCAB    Vitamin B12   Date Value Ref Range Status   11/27/2020 340 213 - 816 pg/mL Final   08/31/2020 260 213 - 816 pg/mL Final   08/05/2013 506 >210 pg/mL Final     Comment:     Interp: 247-911 = Normal   ]    Most recent eye exam date: : Not Found       Assessment/Plan:      1.  Type 2 diabetes-  Ms. Patton glucose is overall well controlled.  Will check a1c. She is tolerating current treatment regimen, so we will make no changes. Nurse will let me know if she has hypoglycemia.  Glad she is feeling better.      2.  Risk factors- BP is historically well controlled.  She does have microalbuminuria (54.1 11/23/20).  Unable to tolerate jardiance (yeast infections). She is on gabapentin and cymbalta for neuropathy. GFR is stable.  TSH normal in January, 2020. LDL 30.     3. F/U in 3 months with me, sooner with concerns.        22  minutes spent on the date of the encounter doing chart review, review of test results, review and interpretation of glucose data, patient visit and  documentation, counseling/coordination of care, and discussion of follow up plan for worsening hyper and hypoglycemia.  The patient understood and is satisfied with today's visit.     Renetta Washington PA-C, MPAS   [facility]  Department of Medicine  Division of Endocrinology and Diabetes

## 2022-09-07 NOTE — LETTER
9/7/2022        RE: Cristal Preciado  [facility]  [address] [address]        [facility]  Chief Complaint   Patient presents with     senior care Regulatory     [place] Medical Record Number:  [id number]  Place of Service where encounter took place:  [facility]  HPI:    Cristal Preciado  is a 71 year old  (1950), who is being seen today for a routine regulatory visit.  She resides in the long-term care facility [facility]. She has been a resident here since October 2011. She does have multiple complex co- morbidities. She is treated for hypertension and has insulin-dependent diabetes mellitus. She has a endocrinologist who follows her  blood sugars regularly.  She has chronic neuropathy, chronic kidney disease and is treated with Cymbalta for depression. She is on gabapentin and lyrica for neuropathy pain. She is on meds for depression.  Does require a lift for transfers. She has chronic weakness in her lower extremities.      Today she is seen for a routine regulatory visit.  Today she is having more pain in her hands but does report she gets relief from the medications.   She denies chest pain or shortness of breath.  She denies cough or congestion.  She is  non ambulatory, She is on  gabapentin and lyrica for neuropathy.. Her BS are being controlled by her endocrinologist and being sent in every couple of weeks.  She denies cough or congestion No open areas on her skin.  Her labs were reviewed and last A1c on 8/17/2022 was 6.9 which is down from 8.1.  Her weights were reviewed and she has been stable over the last month.    MDS- 7/20/22     ALLERGIES: Dust mites, Food allergy formula, and Pollen extract  PAST MEDICAL HISTORY:   Past Medical History:   Diagnosis Date     AION (anterior ischaemic optic neuropathy), left eye     NAION LE     CAD (coronary artery disease) 3/2009    [facility]; Angio 2013 [facility]- normal  coronary arteries     Cataract      CVA (cerebral vascular accident) (H)     admitted at [facility]     on Cymbalta     Diabetes mellitus, type 2 (H)      Diabetic nephropathy (H)      Diabetic neuropathy (H)     severe     Diabetic retinopathy (H)      GERD (gastroesophageal reflux disease)      Hyperlipidemia      Hypertension     ECHO 2013, TDS, NL EF     POAG (primary open-angle glaucoma)     adv BE     Seasonal allergies      Tubular adenoma of colon     repeat colonoscopy in 2018      PAST SURGICAL HISTORY:  has a past surgical history that includes Extracapsular cataract extration with intraocular lens implant (11-10-09, 2-9-10); Colonoscopy (7/15/2013);  section; stent, coronary, lainey (); DaVINCI hysterectomy total, bilateral salpingo-oophorectomy, combined (N/A, 2019); Colonoscopy (N/A, 2020); Coronary Stent Placement (2004); Cardiac catheterization; Hysterectomy total abdominal, bilateral salpingo-oophorectomy, combined (2019); Cataract Extraction W/ Intraocular Lens Implant (Bilateral); and  Section.  IMMUNIZATIONS:  Immunization History   Administered Date(s) Administered     COVID-19,PF,Moderna 2020, 2021, 2021, 2022     FLU 6-35 months 2009, 2010     Flu, Unspecified 10/26/2016, 10/12/2017, 10/15/2018, 10/11/2019, 10/22/2020     Influenza (High Dose) 3 valent vaccine 10/15/2018, 10/11/2019, 10/22/2020, 10/18/2021     Influenza (IIV3) PF 2003, 11/10/2005, 10/19/2006, 2009, 10/24/2011, 2012, 10/21/2015     Influenza Vaccine IM > 6 months Valent IIV4 (Alfuria,Fluzone) 2014     Influenza Vaccine IM Ages 6-35 Months 4 Valent (PF) 10/18/2021     Pneumococcal (PCV 7) 2003     Pneumococcal 23 valent 10/24/2011     TD (ADULT, 7+) 2003     TDAP Vaccine (Boostrix) 2014     Tdap (Adacel,Boostrix) 2014     Zoster vaccine, live 2013           Current Outpatient  Medications:      acetaminophen (TYLENOL) 500 MG tablet, Take 1,000 mg by mouth 3 times daily, Disp: , Rfl:      ASPIRIN LOW DOSE 81 MG chewable tablet, CHEW AND SWALLOW 1 TAB BY MOUTH ONCE DAILY IN THE MORNING, Disp: , Rfl: 99     atorvastatin (LIPITOR) 80 MG tablet, Take 1 tablet by mouth daily. Pt needs to have labs done prior to further refills., Disp: 90 tablet, Rfl: 0     bisacodyl (DULCOLAX) 10 MG suppository, Place 10 mg rectally daily as needed for constipation, Disp: , Rfl:      carboxymethylcellulose (REFRESH PLUS) 0.5 % SOLN, 1 drop 3 times daily as needed., Disp: , Rfl:      CHLORTHALIDONE PO, Take 25 mg by mouth every morning , Disp: , Rfl:      diclofenac (VOLTAREN) 1 % topical gel, Apply 2 g topically 4 times daily, Disp: , Rfl:      dorzolamide-timolol (COSOPT) 2-0.5 % ophthalmic solution, Place 1 drop into both eyes 2 times daily, Disp: 10 mL, Rfl: 3     DULoxetine HCl (CYMBALTA PO), Take 90 mg by mouth every morning , Disp: , Rfl:      folic acid (FOLVITE) 1 MG tablet, Take 1 mg by mouth every morning , Disp: , Rfl:      gabapentin (NEURONTIN) 300 MG capsule, Take 400 mg by mouth 3 times daily, Disp: , Rfl:      insulin glargine U-300 (TOUJEO) 300 UNIT/ML (1 units dial) pen, Inject 70 Units Subcutaneous At Bedtime, Disp: 70 mL, Rfl: 3     ketoconazole (NIZORAL) 2 % shampoo, To entire wet scalp and ears and then wash off after 5 minutes three times a week., Disp: 240 mL, Rfl: 11     latanoprost (XALATAN) 0.005 % ophthalmic solution, Place 1 drop into both eyes At Bedtime, Disp: 5 mL, Rfl: 2     liraglutide (VICTOZA PEN) 18 MG/3ML solution, Inject 1.8 mg Subcutaneous daily, Disp: 9 mL, Rfl: 11     loperamide (IMODIUM A-D) 2 MG tablet, Take 2 mg by mouth 4 times daily as needed for diarrhea, Disp: , Rfl:      loratadine (CLARITIN) 10 MG tablet, Take 10 mg by mouth as needed., Disp: , Rfl:      metFORMIN (GLUCOPHAGE-XR) 500 MG 24 hr tablet, Take 2 tablets (1,000 mg) by mouth daily (with dinner), Disp:  180 tablet, Rfl: 3     metoprolol tartrate (LOPRESSOR) 100 MG tablet, Take 100 mg by mouth 2 times daily, Disp: , Rfl:      nitroGLYCERIN (NITROSTAT) 0.4 MG SL tablet, Place 0.4 mg under the tongue every 5 minutes as needed., Disp: , Rfl:      olopatadine (PATADAY) 0.2 % ophthalmic solution, Place 1 drop into both eyes daily For itchy eyes, Disp: 2.5 mL, Rfl: 3     omeprazole (PRILOSEC) 20 MG DR capsule, Take 20 mg by mouth, Disp: , Rfl:      pregabalin (LYRICA) 75 MG capsule, Take 1 capsule (75 mg) by mouth 3 times daily, Disp: 90 capsule, Rfl: 3     senna-docusate (SENOKOT-S/PERICOLACE) 8.6-50 MG tablet, Take 2 tablets by mouth 2 times daily as needed for constipation, Disp: 60 tablet, Rfl: 0     simethicone (MYLICON) 125 MG chewable tablet, Take 125 mg by mouth 4 times daily as needed After meals and HS prn, Disp: , Rfl:        ROS:  Constitutional: Positive for activity change. Negative for appetite change, chills, fatigue and fever.        Lift for transfers.    HENT: Negative for congestion and sore throat.    Respiratory: Negative for shortness of breath and wheezing.    Cardiovascular: negative for leg swelling . Negative for chest pain.  compression stockings   Gastrointestinal: Negative for abdominal distention, abdominal pain, constipation, diarrhea and nausea.   Genitourinary: Negative for dysuria.   Musculoskeletal: Positive for arthralgias. Negative for myalgias.   Skin: Negative for color change, rash and wound.   Neurological: Positive for numbness. Negative for dizziness and weakness. neuropathy bilateral hands   Psychiatric/Behavioral: Negative for agitation, behavioral problems and sleep disturbance.     Vitals:  /60   Pulse 74   Temp 98.3  F (36.8  C)   Resp 18   Wt 72.8 kg (160 lb 6.4 oz)   SpO2 98%   BMI 28.41 kg/m   Body mass index is 28.41 kg/m .  Exam:   Abdominal: .  Abdomen is soft but distended  Skin: healed old lap sites on abdomen    Constitutional: She is oriented to  person, place, and time. She appears well-developed and well-nourished.   Pleasant woman in no acute distress.   HENT:   Head: Normocephalic.   Eyes: Conjunctivae are normal.   Neck: Normal range of motion.   Cardiovascular: Normal rate, regular rhythm and normal heart sounds.   No murmur heard.  Pulmonary/Chest: Breath sounds normal. No respiratory distress. She has no wheezes. She has no rales.   Abdominal: Soft. Bowel sounds are normal. She exhibits no distension. There is no tenderness.   Musculoskeletal: She exhibits edema.   Wearing Compression socks   Neurological: She is alert and oriented to person, place, and time.   Neuropathy bilateral hands.   Skin: Skin is warm.   Psychiatric: She has a normal mood and affect. Her behavior is normal    Lab/Diagnostic data:   No results found for this or any previous visit (from the past 240 hour(s)).    ASSESSMENT/PLAN    Chronic right shoulder pain- Voltaren gel topically QID PRN, Tylenol TID    DM Type 2. FS are stable last A1C 6.9 on 8/17/2022 which is down from 8.1. Endocrinologist following , Continue current diabetic medications as above.       HTN. Continue  Lisinpril and metoprolol-stable no changes     Neuropathy   On lyrica and gabapentin.no changes,      CAD. HX  MI and stent.      S/p Hysterectomy in October 2019     Electronically signed by:  Shawna Mesa CNP         Sincerely,        Shawna Mesa CNP

## 2022-09-07 NOTE — PROGRESS NOTES
[facility] GERIATRIC SERVICES  Chief Complaint   Patient presents with     skilled nursing Regulatory     [place] Medical Record Number:  [id number]  Place of Service where encounter took place:  [facility]  HPI:    Cristal Preciado  is a 71 year old  (1950), who is being seen today for a routine regulatory visit.  She resides in the long-term care facility [facility]. She has been a resident here since October 2011. She does have multiple complex co- morbidities. She is treated for hypertension and has insulin-dependent diabetes mellitus. She has a endocrinologist who follows her  blood sugars regularly.  She has chronic neuropathy, chronic kidney disease and is treated with Cymbalta for depression. She is on gabapentin and lyrica for neuropathy pain. She is on meds for depression.  Does require a lift for transfers. She has chronic weakness in her lower extremities.      Today she is seen for a routine regulatory visit.  Today she is having more pain in her hands but does report she gets relief from the medications.   She denies chest pain or shortness of breath.  She denies cough or congestion.  She is  non ambulatory, She is on  gabapentin and lyrica for neuropathy.. Her BS are being controlled by her endocrinologist and being sent in every couple of weeks.  She denies cough or congestion No open areas on her skin.  Her labs were reviewed and last A1c on 8/17/2022 was 6.9 which is down from 8.1.  Her weights were reviewed and she has been stable over the last month.    MDS- 7/20/22     ALLERGIES: Dust mites, Food allergy formula, and Pollen extract  PAST MEDICAL HISTORY:   Past Medical History:   Diagnosis Date     AION (anterior ischaemic optic neuropathy), left eye     NAION ALEX     CAD (coronary artery disease) 3/2009    [facility]; Angio 2013 [facility]- normal coronary arteries     Cataract      CVA (cerebral vascular accident) (H)     admitted at [facility]     on  Cymbalta     Diabetes mellitus, type 2 (H)      Diabetic nephropathy (H)      Diabetic neuropathy (H)     severe     Diabetic retinopathy (H)      GERD (gastroesophageal reflux disease)      Hyperlipidemia      Hypertension     ECHO 2013, TDS, NL EF     POAG (primary open-angle glaucoma)     adv BE     Seasonal allergies      Tubular adenoma of colon     repeat colonoscopy in 2018      PAST SURGICAL HISTORY:  has a past surgical history that includes Extracapsular cataract extration with intraocular lens implant (11-10-09, 2-9-10); Colonoscopy (7/15/2013);  section; stent, coronary, lainey (); DaVINCI hysterectomy total, bilateral salpingo-oophorectomy, combined (N/A, 2019); Colonoscopy (N/A, 2020); Coronary Stent Placement (2004); Cardiac catheterization; Hysterectomy total abdominal, bilateral salpingo-oophorectomy, combined (2019); Cataract Extraction W/ Intraocular Lens Implant (Bilateral); and  Section.  IMMUNIZATIONS:  Immunization History   Administered Date(s) Administered     COVID-19,PF,Moderna 2020, 2021, 2021, 2022     FLU 6-35 months 2009, 2010     Flu, Unspecified 10/26/2016, 10/12/2017, 10/15/2018, 10/11/2019, 10/22/2020     Influenza (High Dose) 3 valent vaccine 10/15/2018, 10/11/2019, 10/22/2020, 10/18/2021     Influenza (IIV3) PF 2003, 11/10/2005, 10/19/2006, 2009, 10/24/2011, 2012, 10/21/2015     Influenza Vaccine IM > 6 months Valent IIV4 (Alfuria,Fluzone) 2014     Influenza Vaccine IM Ages 6-35 Months 4 Valent (PF) 10/18/2021     Pneumococcal (PCV 7) 2003     Pneumococcal 23 valent 10/24/2011     TD (ADULT, 7+) 2003     TDAP Vaccine (Boostrix) 2014     Tdap (Adacel,Boostrix) 2014     Zoster vaccine, live 2013           Current Outpatient Medications:      acetaminophen (TYLENOL) 500 MG tablet, Take 1,000 mg by mouth 3 times daily, Disp: , Rfl:      ASPIRIN LOW DOSE  81 MG chewable tablet, CHEW AND SWALLOW 1 TAB BY MOUTH ONCE DAILY IN THE MORNING, Disp: , Rfl: 99     atorvastatin (LIPITOR) 80 MG tablet, Take 1 tablet by mouth daily. Pt needs to have labs done prior to further refills., Disp: 90 tablet, Rfl: 0     bisacodyl (DULCOLAX) 10 MG suppository, Place 10 mg rectally daily as needed for constipation, Disp: , Rfl:      carboxymethylcellulose (REFRESH PLUS) 0.5 % SOLN, 1 drop 3 times daily as needed., Disp: , Rfl:      CHLORTHALIDONE PO, Take 25 mg by mouth every morning , Disp: , Rfl:      diclofenac (VOLTAREN) 1 % topical gel, Apply 2 g topically 4 times daily, Disp: , Rfl:      dorzolamide-timolol (COSOPT) 2-0.5 % ophthalmic solution, Place 1 drop into both eyes 2 times daily, Disp: 10 mL, Rfl: 3     DULoxetine HCl (CYMBALTA PO), Take 90 mg by mouth every morning , Disp: , Rfl:      folic acid (FOLVITE) 1 MG tablet, Take 1 mg by mouth every morning , Disp: , Rfl:      gabapentin (NEURONTIN) 300 MG capsule, Take 400 mg by mouth 3 times daily, Disp: , Rfl:      insulin glargine U-300 (TOUJEO) 300 UNIT/ML (1 units dial) pen, Inject 70 Units Subcutaneous At Bedtime, Disp: 70 mL, Rfl: 3     ketoconazole (NIZORAL) 2 % shampoo, To entire wet scalp and ears and then wash off after 5 minutes three times a week., Disp: 240 mL, Rfl: 11     latanoprost (XALATAN) 0.005 % ophthalmic solution, Place 1 drop into both eyes At Bedtime, Disp: 5 mL, Rfl: 2     liraglutide (VICTOZA PEN) 18 MG/3ML solution, Inject 1.8 mg Subcutaneous daily, Disp: 9 mL, Rfl: 11     loperamide (IMODIUM A-D) 2 MG tablet, Take 2 mg by mouth 4 times daily as needed for diarrhea, Disp: , Rfl:      loratadine (CLARITIN) 10 MG tablet, Take 10 mg by mouth as needed., Disp: , Rfl:      metFORMIN (GLUCOPHAGE-XR) 500 MG 24 hr tablet, Take 2 tablets (1,000 mg) by mouth daily (with dinner), Disp: 180 tablet, Rfl: 3     metoprolol tartrate (LOPRESSOR) 100 MG tablet, Take 100 mg by mouth 2 times daily, Disp: , Rfl:       nitroGLYCERIN (NITROSTAT) 0.4 MG SL tablet, Place 0.4 mg under the tongue every 5 minutes as needed., Disp: , Rfl:      olopatadine (PATADAY) 0.2 % ophthalmic solution, Place 1 drop into both eyes daily For itchy eyes, Disp: 2.5 mL, Rfl: 3     omeprazole (PRILOSEC) 20 MG DR capsule, Take 20 mg by mouth, Disp: , Rfl:      pregabalin (LYRICA) 75 MG capsule, Take 1 capsule (75 mg) by mouth 3 times daily, Disp: 90 capsule, Rfl: 3     senna-docusate (SENOKOT-S/PERICOLACE) 8.6-50 MG tablet, Take 2 tablets by mouth 2 times daily as needed for constipation, Disp: 60 tablet, Rfl: 0     simethicone (MYLICON) 125 MG chewable tablet, Take 125 mg by mouth 4 times daily as needed After meals and HS prn, Disp: , Rfl:        ROS:  Constitutional: Positive for activity change. Negative for appetite change, chills, fatigue and fever.        Lift for transfers.    HENT: Negative for congestion and sore throat.    Respiratory: Negative for shortness of breath and wheezing.    Cardiovascular: negative for leg swelling . Negative for chest pain.  compression stockings   Gastrointestinal: Negative for abdominal distention, abdominal pain, constipation, diarrhea and nausea.   Genitourinary: Negative for dysuria.   Musculoskeletal: Positive for arthralgias. Negative for myalgias.   Skin: Negative for color change, rash and wound.   Neurological: Positive for numbness. Negative for dizziness and weakness. neuropathy bilateral hands   Psychiatric/Behavioral: Negative for agitation, behavioral problems and sleep disturbance.     Vitals:  /60   Pulse 74   Temp 98.3  F (36.8  C)   Resp 18   Wt 72.8 kg (160 lb 6.4 oz)   SpO2 98%   BMI 28.41 kg/m   Body mass index is 28.41 kg/m .  Exam:   Abdominal: .  Abdomen is soft but distended  Skin: healed old lap sites on abdomen    Constitutional: She is oriented to person, place, and time. She appears well-developed and well-nourished.   Pleasant woman in no acute distress.   HENT:    Head: Normocephalic.   Eyes: Conjunctivae are normal.   Neck: Normal range of motion.   Cardiovascular: Normal rate, regular rhythm and normal heart sounds.   No murmur heard.  Pulmonary/Chest: Breath sounds normal. No respiratory distress. She has no wheezes. She has no rales.   Abdominal: Soft. Bowel sounds are normal. She exhibits no distension. There is no tenderness.   Musculoskeletal: She exhibits edema.   Wearing Compression socks   Neurological: She is alert and oriented to person, place, and time.   Neuropathy bilateral hands.   Skin: Skin is warm.   Psychiatric: She has a normal mood and affect. Her behavior is normal    Lab/Diagnostic data:   No results found for this or any previous visit (from the past 240 hour(s)).    ASSESSMENT/PLAN    Chronic right shoulder pain- Voltaren gel topically QID PRN, Tylenol TID    DM Type 2. FS are stable last A1C 6.9 on 8/17/2022 which is down from 8.1. Endocrinologist following , Continue current diabetic medications as above.       HTN. Continue  Lisinpril and metoprolol-stable no changes     Neuropathy   On lyrica and gabapentin.no changes,      CAD. HX  MI and stent.      S/p Hysterectomy in October 2019     Electronically signed by:  Shawna Mesa, CNP

## 2022-09-12 NOTE — TELEPHONE ENCOUNTER
Glucose results labeled to scan. Emailed to provider.   Jacquelyn Rubio RN on 9/12/2022 at 1:26 PM

## 2022-09-22 NOTE — CONFIDENTIAL NOTE
[facility] Geriatrics Triage Nurse Telephone Encounter    Provider: DANYELLE Wesley  Facility: [facility] Facility Type:  LT    Caller: Evie  Call Back Number: [phone]    Allergies:    Allergies   Allergen Reactions     Dust Mites Other (See Comments)     Sneezing runny eyes and nose.     Food Allergy Formula Hives     Mountain Dew and Walnuts     Pollen Extract Other (See Comments)     Sneezing runny eyes and nose.        Reason for call: Nurse called to report CBC and BMP results.     Verbal Order/Direction given by Provider: NNO    Provider giving Order:  DANYELLE Wesley    Verbal Order given to: Evie Painting RN

## 2022-09-28 NOTE — TELEPHONE ENCOUNTER
"----- Message from Renetta Washington PA-C sent at 9/28/2022  2:30 PM CDT -----  Regarding: RE: glucose results and status update from assisted emailed to you  I spoke to nurse and asked them to reduce toujeo from 65 units to 50 units and to reduce Victoza to 1.2 mg weekly (from 1.8).  Send update later this week.   HILL Noriega  ----- Message -----  From: Jacquelyn Rubio RN  Sent: 9/28/2022  10:58 AM CDT  To: Renetta Washington PA-C  Subject: glucose results and status update from Care #    Copy labeled to scan.     \"Cristal's appetite has decreased greatly. Her weight on 09/22 was 158# down from June 164.4#. Has c/o dysphagia, speech therapy working w/her but not finding anything to cause concern. Toujeo dose was discussed on 09/08 to 65u subcutaneous.\"  Asking:\"should we decrease dose again?\"      "

## 2022-09-28 NOTE — PROGRESS NOTES
[facility] GERIATRIC SERVICES  Chief Complaint   Patient presents with     alf Acute     [place] Medical Record Number:  [id number]  Place of Service where encounter took place:  [facility]  HPI:    Cristal Preciado  is a 71 year old  (1950), who is being seen today for for reports of poor p.o. intake.  She resides in the long-term care facility [facility]. She has been a resident here since October 2011. She does have multiple complex co- morbidities. She is treated for hypertension and has insulin-dependent diabetes mellitus. She has a endocrinologist who follows her  blood sugars regularly.  She has chronic neuropathy, chronic kidney disease and is treated with Cymbalta for depression. She is on gabapentin and lyrica for neuropathy pain. She is on meds for depression.  Does require a lift for transfers. She has chronic weakness in her lower extremities.      Today she is seen for failure to thrive and poor p.o. intake.  She reports she feels she is having difficulty swallowing.  When asked about liquids  she reported she feels it stays in her mouth and does not go down well.  She is being followed by speech therapy and staff report they have not noticed anything abnormal.  GI swallow study was ordered today.  Staff did update her endocrinologist who controls her blood sugar medications regarding low blood sugars due to not eating. She denies chest pain or shortness of breath.  She denies cough or congestion.  She is  non ambulatory, She is on  gabapentin and lyrica for neuropathy.. Her BS are being controlled by her endocrinologist and being sent in every couple of weeks.  She denies cough or congestion No open areas on her skin.  Her weights were reviewed and her weight has been between 154 and 160 over the last 2 months with the most recent on 9/22/2020 being 158 pounds.       ALLERGIES: Dust mites, Food allergy formula, and Pollen extract  PAST MEDICAL HISTORY:   Past Medical  History:   Diagnosis Date     AION (anterior ischaemic optic neuropathy), left eye     NAION LE     CAD (coronary artery disease) 3/2009    [facility]; Angio  UM- normal coronary arteries     Cataract      CVA (cerebral vascular accident) (H)     admitted at [facility]     on Cymbalta     Diabetes mellitus, type 2 (H)      Diabetic nephropathy (H)      Diabetic neuropathy (H)     severe     Diabetic retinopathy (H)      GERD (gastroesophageal reflux disease)      Hyperlipidemia      Hypertension     ECHO 2013, TDS, NL EF     POAG (primary open-angle glaucoma)     adv BE     Seasonal allergies      Tubular adenoma of colon 2013    repeat colonoscopy in 2018      PAST SURGICAL HISTORY:  has a past surgical history that includes Extracapsular cataract extration with intraocular lens implant (11-10-09, 2-9-10); Colonoscopy (7/15/2013);  section; stent, coronary, lainey (); DaVINCI hysterectomy total, bilateral salpingo-oophorectomy, combined (N/A, 2019); Colonoscopy (N/A, 2020); Coronary Stent Placement (2004); Cardiac catheterization; Hysterectomy total abdominal, bilateral salpingo-oophorectomy, combined (2019); Cataract Extraction W/ Intraocular Lens Implant (Bilateral); and  Section.  IMMUNIZATIONS:  Immunization History   Administered Date(s) Administered     COVID-19,PF,Moderna 2020, 2021, 2021, 2022     FLU 6-35 months 2009, 2010     Flu, Unspecified 10/26/2016, 10/12/2017, 10/15/2018, 10/11/2019, 10/22/2020     Influenza (High Dose) 3 valent vaccine 10/15/2018, 10/11/2019, 10/22/2020, 10/18/2021     Influenza (IIV3) PF 2003, 11/10/2005, 10/19/2006, 2009, 10/24/2011, 2012, 10/21/2015     Influenza Vaccine IM > 6 months Valent IIV4 (Alfuria,Fluzone) 2014     Influenza Vaccine IM Ages 6-35 Months 4 Valent (PF) 10/18/2021     Pneumococcal (PCV 7) 2003     Pneumococcal 23 valent 10/24/2011      TD (ADULT, 7+) 05/13/2003     TDAP Vaccine (Boostrix) 06/12/2014     Tdap (Adacel,Boostrix) 06/12/2014     Zoster vaccine, live 12/12/2013           Current Outpatient Medications:      acetaminophen (TYLENOL) 500 MG tablet, Take 1,000 mg by mouth 3 times daily, Disp: , Rfl:      ASPIRIN LOW DOSE 81 MG chewable tablet, CHEW AND SWALLOW 1 TAB BY MOUTH ONCE DAILY IN THE MORNING, Disp: , Rfl: 99     atorvastatin (LIPITOR) 80 MG tablet, Take 1 tablet by mouth daily. Pt needs to have labs done prior to further refills., Disp: 90 tablet, Rfl: 0     bisacodyl (DULCOLAX) 10 MG suppository, Place 10 mg rectally daily as needed for constipation, Disp: , Rfl:      carboxymethylcellulose (REFRESH PLUS) 0.5 % SOLN, 1 drop 3 times daily as needed., Disp: , Rfl:      CHLORTHALIDONE PO, Take 25 mg by mouth every morning , Disp: , Rfl:      diclofenac (VOLTAREN) 1 % topical gel, Apply 2 g topically 4 times daily, Disp: , Rfl:      dorzolamide-timolol (COSOPT) 2-0.5 % ophthalmic solution, Place 1 drop into both eyes 2 times daily, Disp: 10 mL, Rfl: 3     DULoxetine HCl (CYMBALTA PO), Take 90 mg by mouth every morning , Disp: , Rfl:      folic acid (FOLVITE) 1 MG tablet, Take 1 mg by mouth every morning , Disp: , Rfl:      gabapentin (NEURONTIN) 300 MG capsule, Take 400 mg by mouth 3 times daily, Disp: , Rfl:      insulin glargine U-300 (TOUJEO) 300 UNIT/ML (1 units dial) pen, Inject 70 Units Subcutaneous At Bedtime (Patient taking differently: Inject 50 Units Subcutaneous At Bedtime), Disp: 70 mL, Rfl: 3     ketoconazole (NIZORAL) 2 % shampoo, To entire wet scalp and ears and then wash off after 5 minutes three times a week., Disp: 240 mL, Rfl: 11     latanoprost (XALATAN) 0.005 % ophthalmic solution, Place 1 drop into both eyes At Bedtime, Disp: 5 mL, Rfl: 2     liraglutide (VICTOZA PEN) 18 MG/3ML solution, Inject 1.8 mg Subcutaneous daily (Patient taking differently: Inject 1.2 mg Subcutaneous daily), Disp: 9 mL, Rfl: 11      loperamide (IMODIUM A-D) 2 MG tablet, Take 2 mg by mouth 4 times daily as needed for diarrhea, Disp: , Rfl:      loratadine (CLARITIN) 10 MG tablet, Take 10 mg by mouth as needed., Disp: , Rfl:      metFORMIN (GLUCOPHAGE-XR) 500 MG 24 hr tablet, Take 2 tablets (1,000 mg) by mouth daily (with dinner), Disp: 180 tablet, Rfl: 3     metoprolol tartrate (LOPRESSOR) 100 MG tablet, Take 100 mg by mouth 2 times daily, Disp: , Rfl:      nitroGLYCERIN (NITROSTAT) 0.4 MG SL tablet, Place 0.4 mg under the tongue every 5 minutes as needed., Disp: , Rfl:      olopatadine (PATADAY) 0.2 % ophthalmic solution, Place 1 drop into both eyes daily For itchy eyes, Disp: 2.5 mL, Rfl: 3     omeprazole (PRILOSEC) 20 MG DR capsule, Take 20 mg by mouth, Disp: , Rfl:      pregabalin (LYRICA) 75 MG capsule, Take 1 capsule (75 mg) by mouth 3 times daily, Disp: 90 capsule, Rfl: 3     senna-docusate (SENOKOT-S/PERICOLACE) 8.6-50 MG tablet, Take 2 tablets by mouth 2 times daily as needed for constipation, Disp: 60 tablet, Rfl: 0     simethicone (MYLICON) 125 MG chewable tablet, Take 125 mg by mouth 4 times daily as needed After meals and HS prn, Disp: , Rfl:        ROS:  Constitutional: Positive for activity change. Negative for appetite change, chills, fatigue and fever.        Lift for transfers.    HENT: Negative for congestion and sore throat.    Respiratory: Negative for shortness of breath and wheezing.    Cardiovascular: negative for leg swelling . Negative for chest pain.  compression stockings   Gastrointestinal: Negative for abdominal distention, abdominal pain, constipation, diarrhea and nausea.   Genitourinary: Negative for dysuria.   Musculoskeletal: Positive for arthralgias. Negative for myalgias.   Skin: Negative for color change, rash and wound.   Neurological: Positive for numbness. Negative for dizziness and weakness. neuropathy bilateral hands   Psychiatric/Behavioral: Negative for agitation, behavioral problems and sleep  "disturbance.     Vitals:  /79   Pulse 78   Temp 98.2  F (36.8  C)   Resp 18   Ht 1.6 m (5' 3\")   Wt 71.7 kg (158 lb)   SpO2 99%   BMI 27.99 kg/m   Body mass index is 27.99 kg/m .  Exam:   Abdominal: .  Abdomen is soft but distended  Skin: healed old lap sites on abdomen    Constitutional: She is oriented to person, place, and time. She appears well-developed and well-nourished.   Pleasant woman in no acute distress.   HENT:   Head: Normocephalic.   Eyes: Conjunctivae are normal.   Neck: Normal range of motion.   Cardiovascular: Normal rate, regular rhythm and normal heart sounds.   No murmur heard.  Pulmonary/Chest: Breath sounds normal. No respiratory distress. She has no wheezes. She has no rales.   Abdominal: Soft. Bowel sounds are normal. She exhibits no distension. There is no tenderness.   Musculoskeletal: She exhibits edema.   Wearing Compression socks   Neurological: She is alert and oriented to person, place, and time.   Neuropathy bilateral hands.   Skin: Skin is warm.   Psychiatric: She has a normal mood and affect. Her behavior is normal    Lab/Diagnostic data:   Recent Results (from the past 240 hour(s))   Basic metabolic panel    Collection Time: 09/22/22  5:40 AM   Result Value Ref Range    Sodium 137 136 - 145 mmol/L    Potassium 3.6 3.4 - 5.3 mmol/L    Chloride 101 98 - 107 mmol/L    Carbon Dioxide (CO2) 24 22 - 29 mmol/L    Anion Gap 12 7 - 15 mmol/L    Urea Nitrogen 39.2 (H) 8.0 - 23.0 mg/dL    Creatinine 0.96 (H) 0.51 - 0.95 mg/dL    Calcium 10.0 8.8 - 10.2 mg/dL    Glucose 93 70 - 99 mg/dL    GFR Estimate 63 >60 mL/min/1.73m2   CBC with platelets    Collection Time: 09/22/22  5:40 AM   Result Value Ref Range    WBC Count 6.4 4.0 - 11.0 10e3/uL    RBC Count 3.49 (L) 3.80 - 5.20 10e6/uL    Hemoglobin 10.7 (L) 11.7 - 15.7 g/dL    Hematocrit 33.7 (L) 35.0 - 47.0 %    MCV 97 78 - 100 fL    MCH 30.7 26.5 - 33.0 pg    MCHC 31.8 31.5 - 36.5 g/dL    RDW 12.9 10.0 - 15.0 %    Platelet Count " 196 150 - 450 10e3/uL       ASSESSMENT/PLAN    Dysphagia being followed by speech therapy, GI swallow study ordered    Chronic right shoulder pain- Voltaren gel topically QID PRN, Tylenol TID    DM Type 2. FS are stable last A1C 6.9 on 8/17/2022 which is down from 8.1. Endocrinologist following , Continue current diabetic medications as above.       HTN. Continue  Lisinpril and metoprolol-stable no changes     Neuropathy   On lyrica and gabapentin.no changes,      CAD. HX  MI and stent.      S/p Hysterectomy in October 2019     Electronically signed by:  Shawna Mesa, CNP

## 2022-09-28 NOTE — LETTER
9/28/2022        RE: Cristal Preciado  [facility]  [address] [facility] [address]        [facility]  Chief Complaint   Patient presents with     half-way Acute     [place] Medical Record Number:  [id number]  Place of Service where encounter took place:  [facility]  HPI:    Cristal Preciado  is a 71 year old  (1950), who is being seen today for for reports of poor p.o. intake.  She resides in the long-term care facility [facility]. She has been a resident here since October 2011. She does have multiple complex co- morbidities. She is treated for hypertension and has insulin-dependent diabetes mellitus. She has a endocrinologist who follows her  blood sugars regularly.  She has chronic neuropathy, chronic kidney disease and is treated with Cymbalta for depression. She is on gabapentin and lyrica for neuropathy pain. She is on meds for depression.  Does require a lift for transfers. She has chronic weakness in her lower extremities.      Today she is seen for failure to thrive and poor p.o. intake.  She reports she feels she is having difficulty swallowing.  When asked about liquids  she reported she feels it stays in her mouth and does not go down well.  She is being followed by speech therapy and staff report they have not noticed anything abnormal.  GI swallow study was ordered today.  Staff did update her endocrinologist who controls her blood sugar medications regarding low blood sugars due to not eating. She denies chest pain or shortness of breath.  She denies cough or congestion.  She is  non ambulatory, She is on  gabapentin and lyrica for neuropathy.. Her BS are being controlled by her endocrinologist and being sent in every couple of weeks.  She denies cough or congestion No open areas on her skin.  Her weights were reviewed and her weight has been between 154 and 160 over the last 2 months with the most recent on 9/22/2020  being 158 pounds.       ALLERGIES: Dust mites, Food allergy formula, and Pollen extract  PAST MEDICAL HISTORY:   Past Medical History:   Diagnosis Date     AION (anterior ischaemic optic neuropathy), left eye     NAION LE     CAD (coronary artery disease) 3/2009    [facility]; Angio  UM- normal coronary arteries     Cataract      CVA (cerebral vascular accident) (H)     admitted at [facility]     on Cymbalta     Diabetes mellitus, type 2 (H)      Diabetic nephropathy (H)      Diabetic neuropathy (H)     severe     Diabetic retinopathy (H)      GERD (gastroesophageal reflux disease)      Hyperlipidemia      Hypertension     ECHO 2013, TDS, NL EF     POAG (primary open-angle glaucoma)     adv BE     Seasonal allergies      Tubular adenoma of colon 2013    repeat colonoscopy in 2018      PAST SURGICAL HISTORY:  has a past surgical history that includes Extracapsular cataract extration with intraocular lens implant (11-10-09, 2-9-10); Colonoscopy (7/15/2013);  section; stent, coronary, lainey (); DaVINCI hysterectomy total, bilateral salpingo-oophorectomy, combined (N/A, 2019); Colonoscopy (N/A, 2020); Coronary Stent Placement (2004); Cardiac catheterization; Hysterectomy total abdominal, bilateral salpingo-oophorectomy, combined (2019); Cataract Extraction W/ Intraocular Lens Implant (Bilateral); and  Section.  IMMUNIZATIONS:  Immunization History   Administered Date(s) Administered     COVID-19,PF,Moderna 2020, 2021, 2021, 2022     FLU 6-35 months 2009, 2010     Flu, Unspecified 10/26/2016, 10/12/2017, 10/15/2018, 10/11/2019, 10/22/2020     Influenza (High Dose) 3 valent vaccine 10/15/2018, 10/11/2019, 10/22/2020, 10/18/2021     Influenza (IIV3) PF 2003, 11/10/2005, 10/19/2006, 2009, 10/24/2011, 2012, 10/21/2015     Influenza Vaccine IM > 6 months Valent IIV4 (Alfuria,Fluzone) 2014     Influenza  Vaccine IM Ages 6-35 Months 4 Valent (PF) 10/18/2021     Pneumococcal (PCV 7) 11/04/2003     Pneumococcal 23 valent 10/24/2011     TD (ADULT, 7+) 05/13/2003     TDAP Vaccine (Boostrix) 06/12/2014     Tdap (Adacel,Boostrix) 06/12/2014     Zoster vaccine, live 12/12/2013           Current Outpatient Medications:      acetaminophen (TYLENOL) 500 MG tablet, Take 1,000 mg by mouth 3 times daily, Disp: , Rfl:      ASPIRIN LOW DOSE 81 MG chewable tablet, CHEW AND SWALLOW 1 TAB BY MOUTH ONCE DAILY IN THE MORNING, Disp: , Rfl: 99     atorvastatin (LIPITOR) 80 MG tablet, Take 1 tablet by mouth daily. Pt needs to have labs done prior to further refills., Disp: 90 tablet, Rfl: 0     bisacodyl (DULCOLAX) 10 MG suppository, Place 10 mg rectally daily as needed for constipation, Disp: , Rfl:      carboxymethylcellulose (REFRESH PLUS) 0.5 % SOLN, 1 drop 3 times daily as needed., Disp: , Rfl:      CHLORTHALIDONE PO, Take 25 mg by mouth every morning , Disp: , Rfl:      diclofenac (VOLTAREN) 1 % topical gel, Apply 2 g topically 4 times daily, Disp: , Rfl:      dorzolamide-timolol (COSOPT) 2-0.5 % ophthalmic solution, Place 1 drop into both eyes 2 times daily, Disp: 10 mL, Rfl: 3     DULoxetine HCl (CYMBALTA PO), Take 90 mg by mouth every morning , Disp: , Rfl:      folic acid (FOLVITE) 1 MG tablet, Take 1 mg by mouth every morning , Disp: , Rfl:      gabapentin (NEURONTIN) 300 MG capsule, Take 400 mg by mouth 3 times daily, Disp: , Rfl:      insulin glargine U-300 (TOUJEO) 300 UNIT/ML (1 units dial) pen, Inject 70 Units Subcutaneous At Bedtime (Patient taking differently: Inject 50 Units Subcutaneous At Bedtime), Disp: 70 mL, Rfl: 3     ketoconazole (NIZORAL) 2 % shampoo, To entire wet scalp and ears and then wash off after 5 minutes three times a week., Disp: 240 mL, Rfl: 11     latanoprost (XALATAN) 0.005 % ophthalmic solution, Place 1 drop into both eyes At Bedtime, Disp: 5 mL, Rfl: 2     liraglutide (VICTOZA PEN) 18 MG/3ML  solution, Inject 1.8 mg Subcutaneous daily (Patient taking differently: Inject 1.2 mg Subcutaneous daily), Disp: 9 mL, Rfl: 11     loperamide (IMODIUM A-D) 2 MG tablet, Take 2 mg by mouth 4 times daily as needed for diarrhea, Disp: , Rfl:      loratadine (CLARITIN) 10 MG tablet, Take 10 mg by mouth as needed., Disp: , Rfl:      metFORMIN (GLUCOPHAGE-XR) 500 MG 24 hr tablet, Take 2 tablets (1,000 mg) by mouth daily (with dinner), Disp: 180 tablet, Rfl: 3     metoprolol tartrate (LOPRESSOR) 100 MG tablet, Take 100 mg by mouth 2 times daily, Disp: , Rfl:      nitroGLYCERIN (NITROSTAT) 0.4 MG SL tablet, Place 0.4 mg under the tongue every 5 minutes as needed., Disp: , Rfl:      olopatadine (PATADAY) 0.2 % ophthalmic solution, Place 1 drop into both eyes daily For itchy eyes, Disp: 2.5 mL, Rfl: 3     omeprazole (PRILOSEC) 20 MG DR capsule, Take 20 mg by mouth, Disp: , Rfl:      pregabalin (LYRICA) 75 MG capsule, Take 1 capsule (75 mg) by mouth 3 times daily, Disp: 90 capsule, Rfl: 3     senna-docusate (SENOKOT-S/PERICOLACE) 8.6-50 MG tablet, Take 2 tablets by mouth 2 times daily as needed for constipation, Disp: 60 tablet, Rfl: 0     simethicone (MYLICON) 125 MG chewable tablet, Take 125 mg by mouth 4 times daily as needed After meals and HS prn, Disp: , Rfl:        ROS:  Constitutional: Positive for activity change. Negative for appetite change, chills, fatigue and fever.        Lift for transfers.    HENT: Negative for congestion and sore throat.    Respiratory: Negative for shortness of breath and wheezing.    Cardiovascular: negative for leg swelling . Negative for chest pain.  compression stockings   Gastrointestinal: Negative for abdominal distention, abdominal pain, constipation, diarrhea and nausea.   Genitourinary: Negative for dysuria.   Musculoskeletal: Positive for arthralgias. Negative for myalgias.   Skin: Negative for color change, rash and wound.   Neurological: Positive for numbness. Negative  "for dizziness and weakness. neuropathy bilateral hands   Psychiatric/Behavioral: Negative for agitation, behavioral problems and sleep disturbance.     Vitals:  /79   Pulse 78   Temp 98.2  F (36.8  C)   Resp 18   Ht 1.6 m (5' 3\")   Wt 71.7 kg (158 lb)   SpO2 99%   BMI 27.99 kg/m   Body mass index is 27.99 kg/m .  Exam:   Abdominal: .  Abdomen is soft but distended  Skin: healed old lap sites on abdomen    Constitutional: She is oriented to person, place, and time. She appears well-developed and well-nourished.   Pleasant woman in no acute distress.   HENT:   Head: Normocephalic.   Eyes: Conjunctivae are normal.   Neck: Normal range of motion.   Cardiovascular: Normal rate, regular rhythm and normal heart sounds.   No murmur heard.  Pulmonary/Chest: Breath sounds normal. No respiratory distress. She has no wheezes. She has no rales.   Abdominal: Soft. Bowel sounds are normal. She exhibits no distension. There is no tenderness.   Musculoskeletal: She exhibits edema.   Wearing Compression socks   Neurological: She is alert and oriented to person, place, and time.   Neuropathy bilateral hands.   Skin: Skin is warm.   Psychiatric: She has a normal mood and affect. Her behavior is normal    Lab/Diagnostic data:   Recent Results (from the past 240 hour(s))   Basic metabolic panel    Collection Time: 09/22/22  5:40 AM   Result Value Ref Range    Sodium 137 136 - 145 mmol/L    Potassium 3.6 3.4 - 5.3 mmol/L    Chloride 101 98 - 107 mmol/L    Carbon Dioxide (CO2) 24 22 - 29 mmol/L    Anion Gap 12 7 - 15 mmol/L    Urea Nitrogen 39.2 (H) 8.0 - 23.0 mg/dL    Creatinine 0.96 (H) 0.51 - 0.95 mg/dL    Calcium 10.0 8.8 - 10.2 mg/dL    Glucose 93 70 - 99 mg/dL    GFR Estimate 63 >60 mL/min/1.73m2   CBC with platelets    Collection Time: 09/22/22  5:40 AM   Result Value Ref Range    WBC Count 6.4 4.0 - 11.0 10e3/uL    RBC Count 3.49 (L) 3.80 - 5.20 10e6/uL    Hemoglobin 10.7 (L) 11.7 - 15.7 g/dL    Hematocrit 33.7 (L) " 35.0 - 47.0 %    MCV 97 78 - 100 fL    MCH 30.7 26.5 - 33.0 pg    MCHC 31.8 31.5 - 36.5 g/dL    RDW 12.9 10.0 - 15.0 %    Platelet Count 196 150 - 450 10e3/uL       ASSESSMENT/PLAN    Dysphagia being followed by speech therapy, GI swallow study ordered    Chronic right shoulder pain- Voltaren gel topically QID PRN, Tylenol TID    DM Type 2. FS are stable last A1C 6.9 on 8/17/2022 which is down from 8.1. Endocrinologist following , Continue current diabetic medications as above.       HTN. Continue  Lisinpril and metoprolol-stable no changes     Neuropathy   On lyrica and gabapentin.no changes,      CAD. HX  MI and stent.      S/p Hysterectomy in October 2019     Electronically signed by:  Shawna Mesa CNP         Sincerely,        Shawna Mesa CNP

## 2022-10-06 NOTE — PROGRESS NOTES
Primary open angle glaucoma (POAG) adv both eyes   [place] both eyes    Right eye 9/05   Left eye 5/04  NAION left eye  Cataract extraction with intraocular lens    Right eye 2-9-10   Left eye 8-14-12  Diabetes mellitus-no retinopathy last time    Current medications:    Latanoprost both eyes at bedtime   Cosopt both eyes twice a day  Pataday as needed for allergy     RV every 6 months to make sure tubes still covered (covered with conjunctiva only)  Cannot do visual field  Return to clinic 6 months dilated exam Diabetes mellitus and try OCT retinal nerve fiber layer       Attending Physician Attestation:  Complete documentation of historical and exam elements from today's encounter can be found in the full encounter summary report (not reduplicated in this progress note). I personally obtained the chief complaint(s) and history of present illness. I confirmed and edited asnecessary the review of systems, past medical/surgical history, family history, social history, and examination findings as documented by others; and I examined the patient myself. I personally reviewed the relevant tests, images, and reports as documented above. I formulated and edited as necessary the assessment and plan and discussed the findings and management plan with the patient and family.  - Bette Delong MD 10:08 AM 10/31/2018            25 yo Danish speaking Male, with no known past medical history, s/p Left temporal parietal craniotomy noted in imaging, presenting to the ED with altered mental status . GI consult for transaminitis. CT showed  Severe hepatomegaly and diffuse steatosis. Mild ascites and infiltration of the central mesenteric fat. Possible gallbladder sludge and likely secondary/reactive wall thickening.  Left temporal parietal craniotomy. Encephalomalacia subjacent to the craniotomy. Ex vacuo dilatation of the left lateral ventricle. No intracranial hemorrhage. No mass lesion. Labs significant for MWC 16 HH 8.3/26.7 MCV 87  INR 2 Tbili 8 ALk 600   ammonia 131 lactate 3 albumin 1.8. Patient is non verbal. Most information obtained from primary team and medical records. Pt moaning, not able to provide history and pt's brother's contact information not in chart as family left ED prior to phone number being collected. As per EMS charts, pt was found to be 'asleep' when EMS arrived. As per family, pt has a seizure that lasted about 2-4 minutes. Family states pt has a seizure earlier in the day as well. Pt family denies hx of seizures. They stated pt drinks alcohol on a daily basis and hasn't drank any alcohol in the past 2 days. Family denies any recent trauma related injuries to pt.' Pt's brother told ED attending that the jaundice developed acutely over the last 1 week. Patient seen and examined at bedside. Non verbal, only moaning. Abdominal distended and soft. Sclera icteric. Skin icteric. Stool color light brown.

## 2022-10-07 NOTE — PROGRESS NOTES
Speech-Language Pathology: Video Swallow Study     10/07/22 1000   General Information   Type Of Visit Initial   Start Of Care Date 10/07/22   Referring Physician Dr. Meagan Valdez   Orders Evaluate And Treat   Medical Diagnosis Failure to Thrive   Onset Of Illness/injury Or Date Of Surgery 09/28/22   Pertinent History of Current Problem/OT: Additional Occupational Profile Info Per MD progress notes from facility 9/28/22: 71 year old  (1950), who is being seen today for for reports of poor p.o. intake.  She resides in the long-term care facility [facility]. She has been a resident here since October 2011. She does have multiple complex co- morbidities. She is treated for hypertension and has insulin-dependent diabetes mellitus. She has a endocrinologist who follows her  blood sugars regularly.  She has chronic neuropathy, chronic kidney disease and is treated with Cymbalta for depression. She is on gabapentin and lyrica for neuropathy pain. She is on meds for depression.  Does require a lift for transfers. She has chronic weakness in her lower extremities.      Today she is seen for failure to thrive and poor p.o. intake.  She reports she feels she is having difficulty swallowing.  When asked about liquids  she reported she feels it stays in her mouth and does not go down well.  She is being followed by speech therapy and staff report they have not noticed anything abnormal.  GI swallow study was ordered today.  Staff did update her endocrinologist who controls her blood sugar medications regarding low blood sugars due to not eating. She denies chest pain or shortness of breath.  She denies cough or congestion.   General Observations Alert and cooperative; Able to provide history with increased time   Clinical Swallow Evaluation   Oral Musculature anomalies present;generally intact  (generally weak and slow oral motor movements but WFL)   Structural Abnormalities none present   Dentition present  and adequate   Mucosal Quality adequate   Mandibular Strength and Mobility other (see comments)  (mildly weak)   Oral Labial Strength and Mobility other (see comments);impaired coordination  (mildly weak)   Lingual Strength and Mobility impaired coordination;WFL  (mildly weak)   VFSS Evaluation   VFSS Additional Documentation Yes   VFSS Eval: Radiology   Radiologist Dr. Jimenez   Views Taken left lateral   Physical Location of Procedure [facility]   VFSS Eval: Thin Liquid Texture Trial   Mode of Presentation, Thin Liquid cup;straw;self-fed;fed by clinician   Preparatory Phase WFL   Oral Phase, Thin Liquid Delayed AP movement;Effortful AP movement;Premature pharyngeal entry;Residue in oral cavity   Pharyngeal Phase, Thin Liquid Delayed swallow reflex;WFL;Residue in valleculae   Rosenbek's Penetration Aspiration Scale: Thin Liquid Trial Results 3 - contrast remains above the vocal cords, visible residue remains (penetration)   Response to Aspiration other (see comments)  (occasional weak throat clear response to penetration)   Diagnostic Statement Unable to clear supraglottic residuals with cued throat clear; residuals were not aspirated during study and did not worsen as test progressed   VFSS Eval: Mildly Thick Liquids    Mode of Presentation cup;self-fed;fed by clinician   Preparatory Phase WFL   Oral Phase Delayed AP movement;Effortful AP movement;Residue in oral cavity;Premature pharyngeal entry   Pharyngeal Phase Delayed swallow reflex;WFL;Residue in valleculae   Rosenbek's Penetration Aspiration Scale 3 - contrast remains above the vocal cords, visible residue remains (penetration)   Diagnostic Statement Penetration was decreased in quantity compared to thin but similar depth and presentation   VFSS Eval: Moderately Thick Liquids    Mode of Presentation spoon;fed by clinician   Preparatory Phase WFL   Oral Phase Effortful AP movement;Delayed AP movement;Residue in oral cavity   Pharyngeal Phase  Residue in pyriform sinus;Residue in valleculae;WFL;Pharyngeal wall coating   Rosenbek's Penetration Aspiration Scale 1 - no aspiration, contrast does not enter airway   VFSS Eval: Puree Solid Texture Trial   Mode of Presentation, Puree spoon   Preparatory Phase WFL   Oral Phase, Puree WFL   Pharyngeal Phase, Puree Residue in valleculae;Residue in pyriform sinus;WFL;Pharyngeal wall coating   Rosenbek's Penetration Aspiration Scale: Puree Food Trial Results 1 - no aspiration, contrast does not enter airway   VFSS Eval: Regular Texture Trial (Solid)   Mode of Presentation spoon   Preparatory Phase Other (see comments)  (slow, prolonged mastication but complete)   Oral Phase Delayed AP movement;Effortful AP movement;Residue in oral cavity   Pharyngeal Phase Residue in valleculae;Residue in pyriform sinus;WFL;Pharyngeal wall coating   Rosenbek's Penetration Aspiration Scale 1 - no aspiration, contrast does not enter airway   Esophageal Phase of Swallow   Patient reports or presents with symptoms of esophageal dysphagia No   Esophageal sweep performed during today s vidofluoroscopic exam  No   Swallow Eval: Clinical Impressions   Skilled Criteria for Therapy Intervention Skilled criteria met.  Treatment indicated.  (Recommend ST at facility)   Treatment Diagnosis moderate oral dysphagia, mild pharyngeal dysphagia   Diet texture recommendations Soft & Bite Sized diet (level 6);Thin liquids (level 0);Moderately thick liquids/liquidized (level 3)  (per primary SLP pending performance at meals and respiratory medical status; see impressions section)   Recommended Feeding/Eating Techniques alternate between small bites and sips of food/liquid;small sips/bites   Rehab Potential fair, will monitor progress closely   Risks and Benefits of Treatment have been explained. Yes   Patient, family and/or staff in agreement with Plan of Care Yes   Clinical Impression Comments Videofluoroscopic Swallow Study completed. Patient had no  aspiration with any intake despite numerous trials and 2-3 ounce quantity and consistent moderately deep penetration with thin and mildly thick liquids that does not consistently clear or worsen as test progresses. Oral motor function is slow and mildly weak but WFL and oral phase was characterized by delayed and effortful AP transit, transit dysphagia, residuals,  and piecemeal swallow. Tongue base retraction was mild to moderately impaired. Swallow response was delayed, nearly to the pyriforms and epiglottic inversion was slow but complete.  Mild stasis occurred with all intake but most consistent with thicker consistencies. Hyolaryngeal elevation was slow but complete and hyolaryngeal excursion was slow and minimally reduced. Patient presents with transit dysphagia, moderate oral dysphagia and mild pharyngeal dysphagia. Overall, swallow function is inefficient but largely safe. Patient is at risk for aspiration with thin and mildly thick liquids given presence of penetration with some residuals in supraglottic space, however, patient did not aspirate during exam and there are no records indicating concern for aspiration or aspiration related complications such as pneumonia. Given failure to thrive and low oral intake, patient is likely at risk for dehydration which would be further impacted by thickened liquids. Thin and mildly thick liquids are of similar safety with regards to aspiration. Moderately thick liquids are further inefficient and cause increased stasis which may further impact intake. Consider diet of moderately thick liquids with water protocol versus thin liquids with close monitoring of respiratory status. Patient may benefit from softer foods to increase efficiency of intake if this does not further negatively impact intake due to taste/texture changes. Recommend further dysphagia therapy with SLP at facility to assess over the course of a meal, with focus on impact of above results.  (s)   Total  Session Time   SLP Eval: VideoFluoroscopic Swallow function Minutes (84117) 20   Total Evaluation Time 20

## 2022-10-09 NOTE — TELEPHONE ENCOUNTER
Rosalva from [facility] called today with the following concern:    Mr. Preciado continues to have morning hypoglycemic episodes BG today morning was 50 despite reducing the Toujeo dose from 65 to 50 units on 9/28 and reducing Victoza from 1.8 down to 1.2 mg on 9/28.    Plan:  To reduce Toujeo to 45 units.  Continue with Victoza 1.2 milligrams.    She verbalized understanding the plan and will going to contact us if she continues to get low BG readings with the new regimen.

## 2022-10-12 NOTE — TELEPHONE ENCOUNTER
"----- Message from Renetta Washington PA-C sent at 10/11/2022  7:22 PM CDT -----  Regarding: RE: glucose results and status update from [facility] emailed to you  Please notify staff that glucose appears stable.  If appetite continues to decline, will recommend reducing Victoza to 0.6 mg daily.  Thank you.   Renetta    ----- Message -----  From: Jacquelyn Rubio RN  Sent: 9/28/2022  10:58 AM CDT  To: Renetta Washington PA-C  Subject: glucose results and status update from [facility] #    Copy labeled to scan.     \"Cristal's appetite has decreased greatly. Her weight on 09/22 was 158# down from June 164.4#. Has c/o dysphagia, speech therapy working w/her but not finding anything to cause concern. Toujeo dose was discussed on 09/08 to 65u subcutaneous.\"  Asking:\"should we decrease dose again?\"      "

## 2022-10-12 NOTE — TELEPHONE ENCOUNTER
"Attempted to leave vm for Facility Nurse. No answer. Unable to leave message. Faxed update to living facility with clinic number for questions.   Jacquelyn Rubio, RN on 10/12/2022 at 9:33 AM   0     RE    RE: glucose results and status update from [facility] emailed to you  Received: Yesterday  Renetta Washington PA-C Miller, Deanne M, RN  Please notify staff that glucose appears stable.  If appetite continues to decline, will recommend reducing Victoza to 0.6 mg daily.  Thank you.   Renetta           Previous Messages     ----- Message -----   From: Jacquelyn Rubio RN   Sent: 9/28/2022  10:58 AM CDT   To: Renetta Washington PA-C   Subject: glucose results and status update from [facility] *     Copy labeled to scan.     \"Cristal's appetite has decreased greatly. Her weight on 09/22 was 158# down from June 164.4#. Has c/o dysphagia, speech therapy working w/her but not finding anything to cause concern. Toujeo dose was discussed on 09/08 to 65u subcutaneous.\"   Asking:\"should we decrease dose again?\"     "

## 2022-11-08 NOTE — TELEPHONE ENCOUNTER
Review and medication recommendation for upcoming treatment faxed to living facility nursing team. Copy on file.   Jacquelyn Rubio RN on 11/8/2022 at 1:59 PM

## 2022-11-10 NOTE — TELEPHONE ENCOUNTER
Review and recommendation faxed to [facility] nursing team.   Jacquelyn Rubio RN on 11/10/2022 at 3:02 PM

## 2022-11-10 NOTE — TELEPHONE ENCOUNTER
Provider notified via email of BG results for past month.Copy labeled to scan.  Jacquelyn Rubio RN on 11/10/2022 at 2:34 PM

## 2022-11-22 NOTE — LETTER
11/22/2022        RE: Cristal Preciado  [facility]  [address]  [facility] [address]                  M [facility] GERIATRIC [facility]    [facility] Medical Record Number:  [id number]  Place of Service where encounter took place: [facility] () [[id number]]   CODE STATUS:   CPR/Full code     Chief Complaint:  Chief Complaint   Patient presents with     [facility] Regulatory     LTC 11/22/2022. DM, HTN, neuropathy, chronic pain. Functional decline.        HPI:   Cristal is a 72 y.o. female seen for routine physician follow up in LTC at [facility]. She has been a resident here since October 2011. She does have multiple complex co morbidities. She is treated for hypertension and has insulin-dependent diabetes mellitus. She sees an endocrinologist. She has chronic neuropathy, chronic kidney disease and is treated with Cymbalta for depression and chronic pain. She is on gabapentin and lyrica for neuropathy pain. She has chronic weakness in her lower extremities and is wheelchair bound. She has chronic urinary incontinence. She has coronary artery disease having had PCI with stent placement in 2009.       Today:  Cristal has had overall functional decline in recent months. She appears to have cognitive decline as well with nursing concerns about decision making as she likes to go out on LOAs but doesn't always have a good plan for transportation for example. Her last BIMS was 15 which tests as cognitively intact. Her last weight is 164 and has varied 156-164 within the last year. She has diabetes, managed by endocrinology with adjustments in medications made in recent times due to low blood sugars, likely related to poor oral intake. She is on metformin, toujeo and victoza. Accuchecks followed. She has not been ill recently with cough cold or congestion. Has been prone to open areas buttocks, right thigh and gluteal fold, encouraging off loading. She is wheelchair bound,  now has a broda chair due to poor upper body control and per nursing has needed assist with feeding at times. Food is currently soft, small bites which goes better. She is allowed thin liquids without problems. She had a swallow eval on 10/7/2022 which showed significant oral dysphagia though no aspiration. Suggested softer foods and continue speech therapy. She saw [facility] on 10/10/2022 and will be set up for further testing with esophageal manometry and EGD.       Past Medical History:  Past Medical History:   Diagnosis Date     AION (anterior ischaemic optic neuropathy), left eye     NAION LE     CAD (coronary artery disease) 3/2009    [facility]; Angio 2013 UM- normal coronary arteries     Cataract      CVA (cerebral vascular accident) (H)     admitted at [facility]     on Cymbalta     Diabetes mellitus, type 2 (H)      Diabetic nephropathy (H)      Diabetic neuropathy (H)     severe     Diabetic retinopathy (H)      GERD (gastroesophageal reflux disease)      Hyperlipidemia      Hypertension     ECHO 2013, TDS, NL EF     POAG (primary open-angle glaucoma)     adv BE     Seasonal allergies      Tubular adenoma of colon 2013    repeat colonoscopy in 2018       Medications:  Current Outpatient Medications   Medication Sig Dispense Refill     acetaminophen (TYLENOL) 500 MG tablet Take 1,000 mg by mouth 3 times daily       ASPIRIN LOW DOSE 81 MG chewable tablet CHEW AND SWALLOW 1 TAB BY MOUTH ONCE DAILY IN THE MORNING  99     atorvastatin (LIPITOR) 80 MG tablet Take 1 tablet by mouth daily. Pt needs to have labs done prior to further refills. 90 tablet 0     bisacodyl (DULCOLAX) 10 MG suppository Place 10 mg rectally daily as needed for constipation       carboxymethylcellulose (REFRESH PLUS) 0.5 % SOLN 1 drop 3 times daily as needed.       CHLORTHALIDONE PO Take 25 mg by mouth every morning        diclofenac (VOLTAREN) 1 % topical gel Apply 2 g topically 4 times daily       dorzolamide-timolol  (COSOPT) 2-0.5 % ophthalmic solution Place 1 drop into both eyes 2 times daily 10 mL 3     DULoxetine HCl (CYMBALTA PO) Take 90 mg by mouth every morning        folic acid (FOLVITE) 1 MG tablet Take 1 mg by mouth every morning        gabapentin (NEURONTIN) 300 MG capsule Take 400 mg by mouth 3 times daily       insulin glargine U-300 (TOUJEO) 300 UNIT/ML (1 units dial) pen Inject subcu 50 units daily. (Patient taking differently: 45 Units Inject subcu 45 units daily.) 45 mL 1     ketoconazole (NIZORAL) 2 % shampoo To entire wet scalp and ears and then wash off after 5 minutes three times a week. 240 mL 11     latanoprost (XALATAN) 0.005 % ophthalmic solution Place 1 drop into both eyes At Bedtime 5 mL 2     liraglutide (VICTOZA PEN) 18 MG/3ML solution Inject subcu 1.2mg daily. 9 mL 1     loperamide (IMODIUM A-D) 2 MG tablet Take 2 mg by mouth 4 times daily as needed for diarrhea       loratadine (CLARITIN) 10 MG tablet Take 10 mg by mouth as needed.       metFORMIN (GLUCOPHAGE-XR) 500 MG 24 hr tablet Take 2 tablets (1,000 mg) by mouth daily (with dinner) 180 tablet 3     metoprolol tartrate (LOPRESSOR) 100 MG tablet Take 100 mg by mouth 2 times daily       nitroGLYCERIN (NITROSTAT) 0.4 MG SL tablet Place 0.4 mg under the tongue every 5 minutes as needed.       olopatadine (PATADAY) 0.2 % ophthalmic solution Place 1 drop into both eyes daily For itchy eyes 2.5 mL 3     omeprazole (PRILOSEC) 20 MG DR capsule Take 20 mg by mouth       pregabalin (LYRICA) 75 MG capsule Take 1 capsule (75 mg) by mouth 3 times daily 90 capsule 3     senna-docusate (SENOKOT-S/PERICOLACE) 8.6-50 MG tablet Take 2 tablets by mouth 2 times daily as needed for constipation 60 tablet 0     simethicone (MYLICON) 125 MG chewable tablet Take 125 mg by mouth 4 times daily as needed After meals and HS prn          Physical Exam:  General: Patient is alert female, no distress.  Vitals: /68   Pulse 70   Temp 98.3   Resp 18  SpO2 98% RA      HEENT: Head is NCAT. Eyes show no injection or icterus. Nares negative. Oropharynx hydrated.  CV: Non labored respirations.  Abd: Soft, non tender.    : Deferred.  Extremities: Mild edema LEs.   Musculoskeletal: Deformities small joints hands.  Psych: Mood is good.      Labs:  Component      Latest Ref Rng & Units 2/22/2021 10/28/2021 9/22/2022               WBC      4.0 - 11.0 10e3/uL 4.2 5.7 6.4   RBC Count      3.80 - 5.20 10e6/uL 3.43 (L) 3.66 (L) 3.49 (L)   Hemoglobin      11.7 - 15.7 g/dL 10.5 (L) 11.3 (L) 10.7 (L)   Hematocrit      35.0 - 47.0 % 33.1 (L) 34.7 (L) 33.7 (L)   MCV      78 - 100 fL 97 95 97   MCH      26.5 - 33.0 pg 30.6 30.9 30.7   MCHC      31.5 - 36.5 g/dL 31.7 (L) 32.6 31.8   RDW      10.0 - 15.0 % 12.4 12.9 12.9   Platelet Count      150 - 450 10e3/uL 192 219 196     Component      Latest Ref Rng & Units 6/17/2021 10/28/2021 9/22/2022               Sodium      136 - 145 mmol/L 135 (L) 138 137   Potassium      3.4 - 5.3 mmol/L   3.6   Chloride      98 - 107 mmol/L   101   Carbon Dioxide (CO2)      22 - 29 mmol/L   24   Anion Gap      7 - 15 mmol/L 11 13 12   Urea Nitrogen      8.0 - 23.0 mg/dL   39.2 (H)   Creatinine      0.51 - 0.95 mg/dL 1.14 (H) 1.03 0.96 (H)   Calcium      8.8 - 10.2 mg/dL 9.4 10.4 10.0   Glucose      70 - 99 mg/dL   93   GFR Estimate      >60 mL/min/1.73m2 47 (L) 56 (L) 63     Component      Latest Ref Rng & Units 6/17/2021 8/17/2022   Hemoglobin A1C      <5.7 % 8.1 (H) 6.9 (H)       Assessment/Plan:  1. Functional decline. Has had more difficulty with mobility, posture control and weakness in wheelchair.   2. Dysphagia. Had swallow study in Oct. On soft foods, no aspiration, ok for thin liquids. Saw GI, to have further eval soon.   3. IDDM. On victoza, metformin and toujeo, followed closely by endocrinology with appropriate adjustments in meds as needed.   4. HTN. Bps stable on metoprolol and chlorthalidone.   5. Neuropathy. Chronic pain controlled with gabapentin,  lyrica and Cymbalta.  6. Hx of stroke. Wheelchair bound, non ambulatory. On aspirin, statin.  7. Depression. Continue Cymbalta.  8. CKD. Last labs as noted above.         Electronically signed by: Meagan Valdez MD             Sincerely,        Meagan Valdez MD

## 2022-11-27 NOTE — PROGRESS NOTES
[facility] GERIATRIC SERVICES    [facility] Medical Record Number:  [id number]  Place of Service where encounter took place: [facility] () [[id number]]   CODE STATUS:   CPR/Full code     Chief Complaint:  Chief Complaint   Patient presents with     shelter Regulatory     LTC 11/22/2022. DM, HTN, neuropathy, chronic pain. Functional decline.        HPI:   Cristal is a 72 y.o. female seen for routine physician follow up in LTC at [facility]. She has been a resident here since October 2011. She does have multiple complex co morbidities. She is treated for hypertension and has insulin-dependent diabetes mellitus. She sees an endocrinologist. She has chronic neuropathy, chronic kidney disease and is treated with Cymbalta for depression and chronic pain. She is on gabapentin and lyrica for neuropathy pain. She has chronic weakness in her lower extremities and is wheelchair bound. She has chronic urinary incontinence. She has coronary artery disease having had PCI with stent placement in 2009.       Today:  Cristal has had overall functional decline in recent months. She appears to have cognitive decline as well with nursing concerns about decision making as she likes to go out on LOAs but doesn't always have a good plan for transportation for example. Her last BIMS was 15 which tests as cognitively intact. Her last weight is 164 and has varied 156-164 within the last year. She has diabetes, managed by endocrinology with adjustments in medications made in recent times due to low blood sugars, likely related to poor oral intake. She is on metformin, toujeo and victoza. Accuchecks followed. She has not been ill recently with cough cold or congestion. Has been prone to open areas buttocks, right thigh and gluteal fold, encouraging off loading. She is wheelchair bound, now has a broda chair due to poor upper body control and per nursing has needed assist with feeding at times. Food is  currently soft, small bites which goes better. She is allowed thin liquids without problems. She had a swallow eval on 10/7/2022 which showed significant oral dysphagia though no aspiration. Suggested softer foods and continue speech therapy. She saw [facility] on 10/10/2022 and will be set up for further testing with esophageal manometry and EGD.       Past Medical History:  Past Medical History:   Diagnosis Date     AION (anterior ischaemic optic neuropathy), left eye     NAION LE     CAD (coronary artery disease) 3/2009    [facility]; Angio 2013 [facility]- normal coronary arteries     Cataract      CVA (cerebral vascular accident) (H)     admitted at [facility]     on Cymbalta     Diabetes mellitus, type 2 (H)      Diabetic nephropathy (H)      Diabetic neuropathy (H)     severe     Diabetic retinopathy (H)      GERD (gastroesophageal reflux disease)      Hyperlipidemia      Hypertension     ECHO 2013, TDS, NL EF     POAG (primary open-angle glaucoma)     adv BE     Seasonal allergies      Tubular adenoma of colon 2013    repeat colonoscopy in 2018       Medications:  Current Outpatient Medications   Medication Sig Dispense Refill     acetaminophen (TYLENOL) 500 MG tablet Take 1,000 mg by mouth 3 times daily       ASPIRIN LOW DOSE 81 MG chewable tablet CHEW AND SWALLOW 1 TAB BY MOUTH ONCE DAILY IN THE MORNING  99     atorvastatin (LIPITOR) 80 MG tablet Take 1 tablet by mouth daily. Pt needs to have labs done prior to further refills. 90 tablet 0     bisacodyl (DULCOLAX) 10 MG suppository Place 10 mg rectally daily as needed for constipation       carboxymethylcellulose (REFRESH PLUS) 0.5 % SOLN 1 drop 3 times daily as needed.       CHLORTHALIDONE PO Take 25 mg by mouth every morning        diclofenac (VOLTAREN) 1 % topical gel Apply 2 g topically 4 times daily       dorzolamide-timolol (COSOPT) 2-0.5 % ophthalmic solution Place 1 drop into both eyes 2 times daily 10 mL 3     DULoxetine HCl (CYMBALTA PO)  Take 90 mg by mouth every morning        folic acid (FOLVITE) 1 MG tablet Take 1 mg by mouth every morning        gabapentin (NEURONTIN) 300 MG capsule Take 400 mg by mouth 3 times daily       insulin glargine U-300 (TOUJEO) 300 UNIT/ML (1 units dial) pen Inject subcu 50 units daily. (Patient taking differently: 45 Units Inject subcu 45 units daily.) 45 mL 1     ketoconazole (NIZORAL) 2 % shampoo To entire wet scalp and ears and then wash off after 5 minutes three times a week. 240 mL 11     latanoprost (XALATAN) 0.005 % ophthalmic solution Place 1 drop into both eyes At Bedtime 5 mL 2     liraglutide (VICTOZA PEN) 18 MG/3ML solution Inject subcu 1.2mg daily. 9 mL 1     loperamide (IMODIUM A-D) 2 MG tablet Take 2 mg by mouth 4 times daily as needed for diarrhea       loratadine (CLARITIN) 10 MG tablet Take 10 mg by mouth as needed.       metFORMIN (GLUCOPHAGE-XR) 500 MG 24 hr tablet Take 2 tablets (1,000 mg) by mouth daily (with dinner) 180 tablet 3     metoprolol tartrate (LOPRESSOR) 100 MG tablet Take 100 mg by mouth 2 times daily       nitroGLYCERIN (NITROSTAT) 0.4 MG SL tablet Place 0.4 mg under the tongue every 5 minutes as needed.       olopatadine (PATADAY) 0.2 % ophthalmic solution Place 1 drop into both eyes daily For itchy eyes 2.5 mL 3     omeprazole (PRILOSEC) 20 MG DR capsule Take 20 mg by mouth       pregabalin (LYRICA) 75 MG capsule Take 1 capsule (75 mg) by mouth 3 times daily 90 capsule 3     senna-docusate (SENOKOT-S/PERICOLACE) 8.6-50 MG tablet Take 2 tablets by mouth 2 times daily as needed for constipation 60 tablet 0     simethicone (MYLICON) 125 MG chewable tablet Take 125 mg by mouth 4 times daily as needed After meals and HS prn          Physical Exam:  General: Patient is alert female, no distress.  Vitals: /68   Pulse 70   Temp 98.3   Resp 18  SpO2 98% RA     HEENT: Head is NCAT. Eyes show no injection or icterus. Nares negative. Oropharynx hydrated.  CV: Non labored  respirations.  Abd: Soft, non tender.    : Deferred.  Extremities: Mild edema LEs.   Musculoskeletal: Deformities small joints hands.  Psych: Mood is good.      Labs:  Component      Latest Ref Rng & Units 2/22/2021 10/28/2021 9/22/2022               WBC      4.0 - 11.0 10e3/uL 4.2 5.7 6.4   RBC Count      3.80 - 5.20 10e6/uL 3.43 (L) 3.66 (L) 3.49 (L)   Hemoglobin      11.7 - 15.7 g/dL 10.5 (L) 11.3 (L) 10.7 (L)   Hematocrit      35.0 - 47.0 % 33.1 (L) 34.7 (L) 33.7 (L)   MCV      78 - 100 fL 97 95 97   MCH      26.5 - 33.0 pg 30.6 30.9 30.7   MCHC      31.5 - 36.5 g/dL 31.7 (L) 32.6 31.8   RDW      10.0 - 15.0 % 12.4 12.9 12.9   Platelet Count      150 - 450 10e3/uL 192 219 196     Component      Latest Ref Rng & Units 6/17/2021 10/28/2021 9/22/2022               Sodium      136 - 145 mmol/L 135 (L) 138 137   Potassium      3.4 - 5.3 mmol/L   3.6   Chloride      98 - 107 mmol/L   101   Carbon Dioxide (CO2)      22 - 29 mmol/L   24   Anion Gap      7 - 15 mmol/L 11 13 12   Urea Nitrogen      8.0 - 23.0 mg/dL   39.2 (H)   Creatinine      0.51 - 0.95 mg/dL 1.14 (H) 1.03 0.96 (H)   Calcium      8.8 - 10.2 mg/dL 9.4 10.4 10.0   Glucose      70 - 99 mg/dL   93   GFR Estimate      >60 mL/min/1.73m2 47 (L) 56 (L) 63     Component      Latest Ref Rng & Units 6/17/2021 8/17/2022   Hemoglobin A1C      <5.7 % 8.1 (H) 6.9 (H)       Assessment/Plan:  1. Functional decline. Has had more difficulty with mobility, posture control and weakness in wheelchair.   2. Dysphagia. Had swallow study in Oct. On soft foods, no aspiration, ok for thin liquids. Saw GI, to have further eval soon.   3. IDDM. On victoza, metformin and toujeo, followed closely by endocrinology with appropriate adjustments in meds as needed.   4. HTN. Bps stable on metoprolol and chlorthalidone.   5. Neuropathy. Chronic pain controlled with gabapentin, lyrica and Cymbalta.  6. Hx of stroke. Wheelchair bound, non ambulatory. On aspirin, statin.  7. Depression.  Continue Cymbalta.  8. CKD. Last labs as noted above.         Electronically signed by: Meagan Valdez MD

## 2022-11-30 NOTE — PROGRESS NOTES
[facility] GERIATRICS DISCHARGE SUMMARY  PATIENT'S NAME: Cristal Preciado  YOB: 1950  MEDICAL RECORD NUMBER:  [id number]  Place of Service where encounter took place:  [facility] () [[id number]]    PRIMARY CARE PROVIDER AND CLINIC RESPONSIBLE AFTER TRANSFER:   Aditi Allen CNP, [address] [facility] [id number]    Nursing Home: [facility]     Transferring providers: Claribel Beltran CNP, MD Donnie  Date of SNF Admission: 2011  Date of SNF (anticipated) Discharge: 12/2/22  Discharged to: new skilled nursing facility: [facility]  Cognitive Scores: BIMS: 14/15 and n/a  Physical Function: Wheelchair dependent  DME: No new DME needed    CODE STATUS/ADVANCE DIRECTIVES DISCUSSION:  No Order FULL CODE  ALLERGIES: Dust mites, Food allergy formula, and Pollen extract    NURSING FACILITY COURSE   Medication Changes/Rationale:     None recently.     Summary of nursing facility stay:   (E66.01) Morbid obesity (H)  (N18.30) Stage 3 chronic kidney disease, unspecified whether stage 3a or 3b CKD (H)  (E11.51) Diabetes mellitus with peripheral vascular disease (H)    Cristal has been at [facility] since October 2011.  She has a past history of hypertension, type 2 diabetes, neuropathy, CKD and depression.  She takes Cymbalta, gabapentin and Lyrica for depression and neuropathy pain.    She is dependent on staff for ADLs and transfers however has an electric wheelchair for outings.  She is moving to [facility] where her daughter now resides.  She is pleasant and looking forward to moving there however tells me she is going to miss the nursing home and doing arts and crafts that she enjoys here.  She does have an endocrinologist to manage her diabetes, she is also been seeing ENT recently for swallowing difficulties.  She is now on a dysphagia 6 diet.      Discharge Medications:  MED REC REQUIRED  Post Medication Reconciliation Status:   Discharge medications reconciled, continue medications without change         Current Outpatient Medications   Medication Sig Dispense Refill     acetaminophen (TYLENOL) 500 MG tablet Take 1,000 mg by mouth 3 times daily       ASPIRIN LOW DOSE 81 MG chewable tablet CHEW AND SWALLOW 1 TAB BY MOUTH ONCE DAILY IN THE MORNING  99     atorvastatin (LIPITOR) 80 MG tablet Take 1 tablet by mouth daily. Pt needs to have labs done prior to further refills. 90 tablet 0     bisacodyl (DULCOLAX) 10 MG suppository Place 10 mg rectally daily as needed for constipation       carboxymethylcellulose (REFRESH PLUS) 0.5 % SOLN 1 drop 3 times daily as needed.       CHLORTHALIDONE PO Take 25 mg by mouth every morning        diclofenac (VOLTAREN) 1 % topical gel Apply 2 g topically 4 times daily       dorzolamide-timolol (COSOPT) 2-0.5 % ophthalmic solution Place 1 drop into both eyes 2 times daily 10 mL 3     DULoxetine HCl (CYMBALTA PO) Take 90 mg by mouth every morning        folic acid (FOLVITE) 1 MG tablet Take 1 mg by mouth every morning        gabapentin (NEURONTIN) 300 MG capsule Take 400 mg by mouth 3 times daily       insulin glargine U-300 (TOUJEO) 300 UNIT/ML (1 units dial) pen Inject subcu 50 units daily. (Patient taking differently: 45 Units Inject subcu 45 units daily.) 45 mL 1     ketoconazole (NIZORAL) 2 % shampoo To entire wet scalp and ears and then wash off after 5 minutes three times a week. 240 mL 11     latanoprost (XALATAN) 0.005 % ophthalmic solution Place 1 drop into both eyes At Bedtime 5 mL 2     liraglutide (VICTOZA PEN) 18 MG/3ML solution Inject subcu 1.2mg daily. 9 mL 1     loperamide (IMODIUM A-D) 2 MG tablet Take 2 mg by mouth 4 times daily as needed for diarrhea       loratadine (CLARITIN) 10 MG tablet Take 10 mg by mouth as needed.       metFORMIN (GLUCOPHAGE-XR) 500 MG 24 hr tablet Take 2 tablets (1,000 mg) by mouth daily (with dinner) 180 tablet 3     metoprolol tartrate (LOPRESSOR) 100 MG tablet Take  "100 mg by mouth 2 times daily       nitroGLYCERIN (NITROSTAT) 0.4 MG SL tablet Place 0.4 mg under the tongue every 5 minutes as needed.       olopatadine (PATADAY) 0.2 % ophthalmic solution Place 1 drop into both eyes daily For itchy eyes 2.5 mL 3     omeprazole (PRILOSEC) 20 MG DR capsule Take 20 mg by mouth       pregabalin (LYRICA) 75 MG capsule Take 1 capsule (75 mg) by mouth 3 times daily 90 capsule 3     senna-docusate (SENOKOT-S/PERICOLACE) 8.6-50 MG tablet Take 2 tablets by mouth 2 times daily as needed for constipation 60 tablet 0     simethicone (MYLICON) 125 MG chewable tablet Take 125 mg by mouth 4 times daily as needed After meals and HS prn          Controlled medications:   Lyrica to be received at new facility,     Past Medical History:   Past Medical History:   Diagnosis Date     AION (anterior ischaemic optic neuropathy), left eye     NAION LE     CAD (coronary artery disease) 3/2009    [facility]; Angio 2013 [facility]- normal coronary arteries     Cataract      CVA (cerebral vascular accident) (H)     admitted at [facility]     on Cymbalta     Diabetes mellitus, type 2 (H)      Diabetic nephropathy (H)      Diabetic neuropathy (H)     severe     Diabetic retinopathy (H)      GERD (gastroesophageal reflux disease)      Hyperlipidemia      Hypertension     ECHO 2013, TDS, NL EF     POAG (primary open-angle glaucoma)     adv BE     Seasonal allergies      Tubular adenoma of colon 2013    repeat colonoscopy in 2018     Physical Exam:   Vitals: /68   Pulse 70   Temp 98.3  F (36.8  C)   Resp 18   Ht 1.6 m (5' 3\")   Wt 74.4 kg (164 lb)   SpO2 98%   BMI 29.05 kg/m    BMI: Body mass index is 29.05 kg/m .  Physical Exam   General appearance: obese, alert.   HEENT: clear eyes, normocephalic.  Lungs: respirations unlabored on RA  Cardiovascular: S1, S2. Regular rate and rhythm.   ABDOMEN: Globular and soft, non tender.    Extremities: No edema  Skin: Skin color, texture, turgor " normal. No rashes or lesions  Neurologic: oriented. No focal deficits.   Psych: interacts well with caregivers, exhibits logical thought processes and connections, pleasant    SNF labs:   Most Recent 3 CBC's:Recent Labs   Lab Test 09/22/22  0540 10/28/21  0610 02/22/21  0038   WBC 6.4 5.7 4.2   HGB 10.7* 11.3* 10.5*   MCV 97 95 97    219 192     Most Recent 3 BMP's:Recent Labs   Lab Test 09/22/22  0540 10/28/21  0610 06/18/21  0545 06/17/21  0553    138  --  135*   POTASSIUM 3.6 3.7 3.7 3.3*   CHLORIDE 101 103  --  99   CO2 24 22  --  25   BUN 39.2* 33*  --  29*   CR 0.96* 1.03  --  1.14*   ANIONGAP 12 13  --  11   OLAF 10.0 10.4  --  9.4   GLC 93 129*  --  105       DISCHARGE PLAN:    Follow up labs: Per PCP    Medical Follow Up:      Follow up with specialist ENT     [facility] scheduled appointments: tbd    Discharge Services: No therapy or home care recommended.     Discharge Instructions Verbalized to Patient at Discharge:     None    TOTAL DISCHARGE TIME:   Greater than 30 minutes  Electronically signed by:  Claribel Beltran CNP

## 2022-11-30 NOTE — LETTER
11/30/2022        RE: [place] Preciado  [address] [facility] [address] [place] [facility] [address]        [facility] GERIATRICS DISCHARGE SUMMARY  PATIENT'S NAME: Cristal Preciado  YOB: 1950  MEDICAL RECORD NUMBER:  [id number]  Place of Service where encounter took place:  [facility] () [[id number]]    PRIMARY CARE PROVIDER AND CLINIC RESPONSIBLE AFTER TRANSFER:   Aditi Allen CNP, [address] [facility] / [place] [address]    Nursing Home: [facility]     Transferring providers: Claribel Beltran CNP, MD Donnie  Date of SNF Admission: 2011  Date of SNF (anticipated) Discharge: 12/2/22  Discharged to: new skilled nursing facility: [facility]  Cognitive Scores: BIMS: 14/15 and n/a  Physical Function: Wheelchair dependent  DME: No new DME needed    CODE STATUS/ADVANCE DIRECTIVES DISCUSSION:  No Order FULL CODE  ALLERGIES: Dust mites, Food allergy formula, and Pollen extract    NURSING FACILITY COURSE   Medication Changes/Rationale:     None recently.     Summary of nursing facility stay:   (E66.01) Morbid obesity (H)  (N18.30) Stage 3 chronic kidney disease, unspecified whether stage 3a or 3b CKD (H)  (E11.51) Diabetes mellitus with peripheral vascular disease (H)    Cristal has been at [facility] since October 2011.  She has a past history of hypertension, type 2 diabetes, neuropathy, CKD and depression.  She takes Cymbalta, gabapentin and Lyrica for depression and neuropathy pain.    She is dependent on staff for ADLs and transfers however has an electric wheelchair for outings.  She is moving to [facility] where her daughter now resides.  She is pleasant and looking forward to moving there however tells me she is going to miss the nursing home and doing arts and crafts that she enjoys here.  She does have an endocrinologist to manage her diabetes, she is also been seeing ENT recently for swallowing difficulties.   She is now on a dysphagia 6 diet.      Discharge Medications:  MED REC REQUIRED  Post Medication Reconciliation Status:  Discharge medications reconciled, continue medications without change         Current Outpatient Medications   Medication Sig Dispense Refill     acetaminophen (TYLENOL) 500 MG tablet Take 1,000 mg by mouth 3 times daily       ASPIRIN LOW DOSE 81 MG chewable tablet CHEW AND SWALLOW 1 TAB BY MOUTH ONCE DAILY IN THE MORNING  99     atorvastatin (LIPITOR) 80 MG tablet Take 1 tablet by mouth daily. Pt needs to have labs done prior to further refills. 90 tablet 0     bisacodyl (DULCOLAX) 10 MG suppository Place 10 mg rectally daily as needed for constipation       carboxymethylcellulose (REFRESH PLUS) 0.5 % SOLN 1 drop 3 times daily as needed.       CHLORTHALIDONE PO Take 25 mg by mouth every morning        diclofenac (VOLTAREN) 1 % topical gel Apply 2 g topically 4 times daily       dorzolamide-timolol (COSOPT) 2-0.5 % ophthalmic solution Place 1 drop into both eyes 2 times daily 10 mL 3     DULoxetine HCl (CYMBALTA PO) Take 90 mg by mouth every morning        folic acid (FOLVITE) 1 MG tablet Take 1 mg by mouth every morning        gabapentin (NEURONTIN) 300 MG capsule Take 400 mg by mouth 3 times daily       insulin glargine U-300 (TOUJEO) 300 UNIT/ML (1 units dial) pen Inject subcu 50 units daily. (Patient taking differently: 45 Units Inject subcu 45 units daily.) 45 mL 1     ketoconazole (NIZORAL) 2 % shampoo To entire wet scalp and ears and then wash off after 5 minutes three times a week. 240 mL 11     latanoprost (XALATAN) 0.005 % ophthalmic solution Place 1 drop into both eyes At Bedtime 5 mL 2     liraglutide (VICTOZA PEN) 18 MG/3ML solution Inject subcu 1.2mg daily. 9 mL 1     loperamide (IMODIUM A-D) 2 MG tablet Take 2 mg by mouth 4 times daily as needed for diarrhea       loratadine (CLARITIN) 10 MG tablet Take 10 mg by mouth as needed.       metFORMIN (GLUCOPHAGE-XR) 500 MG 24 hr tablet  "Take 2 tablets (1,000 mg) by mouth daily (with dinner) 180 tablet 3     metoprolol tartrate (LOPRESSOR) 100 MG tablet Take 100 mg by mouth 2 times daily       nitroGLYCERIN (NITROSTAT) 0.4 MG SL tablet Place 0.4 mg under the tongue every 5 minutes as needed.       olopatadine (PATADAY) 0.2 % ophthalmic solution Place 1 drop into both eyes daily For itchy eyes 2.5 mL 3     omeprazole (PRILOSEC) 20 MG DR capsule Take 20 mg by mouth       pregabalin (LYRICA) 75 MG capsule Take 1 capsule (75 mg) by mouth 3 times daily 90 capsule 3     senna-docusate (SENOKOT-S/PERICOLACE) 8.6-50 MG tablet Take 2 tablets by mouth 2 times daily as needed for constipation 60 tablet 0     simethicone (MYLICON) 125 MG chewable tablet Take 125 mg by mouth 4 times daily as needed After meals and HS prn          Controlled medications:   Lyrica to be received at new facility,     Past Medical History:   Past Medical History:   Diagnosis Date     AION (anterior ischaemic optic neuropathy), left eye     NAION LE     CAD (coronary artery disease) 3/2009    [facility]; Angio 2013 [facility]- normal coronary arteries     Cataract      CVA (cerebral vascular accident) (H)     admitted at [facility]     on Cymbalta     Diabetes mellitus, type 2 (H)      Diabetic nephropathy (H)      Diabetic neuropathy (H)     severe     Diabetic retinopathy (H)      GERD (gastroesophageal reflux disease)      Hyperlipidemia      Hypertension     ECHO 2013, TDS, NL EF     POAG (primary open-angle glaucoma)     adv BE     Seasonal allergies      Tubular adenoma of colon 2013    repeat colonoscopy in 2018     Physical Exam:   Vitals: /68   Pulse 70   Temp 98.3  F (36.8  C)   Resp 18   Ht 1.6 m (5' 3\")   Wt 74.4 kg (164 lb)   SpO2 98%   BMI 29.05 kg/m    BMI: Body mass index is 29.05 kg/m .  Physical Exam   General appearance: obese, alert.   HEENT: clear eyes, normocephalic.  Lungs: respirations unlabored on RA  Cardiovascular: S1, S2. Regular " rate and rhythm.   ABDOMEN: Globular and soft, non tender.    Extremities: No edema  Skin: Skin color, texture, turgor normal. No rashes or lesions  Neurologic: oriented. No focal deficits.   Psych: interacts well with caregivers, exhibits logical thought processes and connections, pleasant    SNF labs:   Most Recent 3 CBC's:Recent Labs   Lab Test 09/22/22  0540 10/28/21  0610 02/22/21  0038   WBC 6.4 5.7 4.2   HGB 10.7* 11.3* 10.5*   MCV 97 95 97    219 192     Most Recent 3 BMP's:Recent Labs   Lab Test 09/22/22  0540 10/28/21  0610 06/18/21  0545 06/17/21  0553    138  --  135*   POTASSIUM 3.6 3.7 3.7 3.3*   CHLORIDE 101 103  --  99   CO2 24 22  --  25   BUN 39.2* 33*  --  29*   CR 0.96* 1.03  --  1.14*   ANIONGAP 12 13  --  11   OLAF 10.0 10.4  --  9.4   GLC 93 129*  --  105       DISCHARGE PLAN:    Follow up labs: Per PCP    Medical Follow Up:      Follow up with specialist ENT     [facility] scheduled appointments: tbd    Discharge Services: No therapy or home care recommended.     Discharge Instructions Verbalized to Patient at Discharge:     None    TOTAL DISCHARGE TIME:   Greater than 30 minutes  Electronically signed by:  Claribel Beltran CNP                   Sincerely,        Claribel Beltran CNP

## 2022-12-01 NOTE — PROGRESS NOTES
[facility] Care Coordination Contact:     Emailed Ciara HOROWITZ at [facility] and [facility] via [employer] regarding my upcoming visit to facility on 12/12.  Per Ciara Cristal will be moving to [facility] on 12/1/22 as her daughter is there also.     I will see Cristal at new facility on 12/12/22.      Lois Loera RN  Care Coordinator-Long Term Care  [address] [address]  [email or web address]   [email or web address]     Office: [phone]   Fax: [phone]

## 2022-12-05 NOTE — PROGRESS NOTES
Encounter opened due to Regulatory [employer] Update to close FVP Program.      Marisa Gould  Care Management Specialist   [facility]   [phone]\

## 2022-12-05 NOTE — PROGRESS NOTES
Encounter opened due to Regulatory [employer] Update to open FVP Program.    Marisa Gould  Care Management Specialist   [facility]   [phone]

## 2022-12-08 NOTE — PROGRESS NOTES
[facility] GERIATRICS  INITIAL VISIT NOTE  December 8, 2022      PRIMARY CARE PROVIDER AND CLINIC:  Aditi Allen [address] / [place] [address]    [facility] Medical Record Number:  [id number]  Place of Service where encounter took place:  [facility] () [[id number]]    Chief Complaint   Patient presents with     [facility] [place] [facility]       HPI:    Cristal Preciado is a 72 year old  (1950) female seen today at [facility]. Medical history is notable for DM, PAD, CKD, peripheral neuropathy as well as dysphagia. She was living at [facility] since October 2011 but has moved to this facility as her daughter recently was admitted here for long term care. She was admitted to this facility for  medical management and nursing care.      History obtained from: facility chart records, facility staff, patient report and [facility] chart review.      Today, Ms. Preciado is seen in her room laying in bed. She is hungry - sounds like she was missed for breakfast? She will be moving into the same room as her daughter, April, later today. She moved to this facility to be closer to her. Discussed upcoming GI appointments. No concerns today per discussion with nursing.     CODE STATUS: CPR/Full code     ALLERGIES:  Allergies   Allergen Reactions     Dust Mites Other (See Comments)     Sneezing runny eyes and nose.     Food Allergy Formula Hives     Mountain Dew and Walnuts     Pollen Extract Other (See Comments)     Sneezing runny eyes and nose.       PAST MEDICAL HISTORY:   Past Medical History:   Diagnosis Date     AION (anterior ischaemic optic neuropathy), left eye     NAION LE     CAD (coronary artery disease) 3/2009    [facility]; Angio 2013 [facility]- normal coronary arteries     Cataract      CVA (cerebral vascular accident) (H)     admitted at [facility]     on Cymbalta     Diabetes mellitus, type 2 (H)      Diabetic nephropathy (H)      Diabetic  neuropathy (H)     severe     Diabetic retinopathy (H)      GERD (gastroesophageal reflux disease)      Hyperlipidemia      Hypertension     ECHO 2013, TDS, NL EF     POAG (primary open-angle glaucoma)     adv BE     Seasonal allergies      Tubular adenoma of colon     repeat colonoscopy in        PAST SURGICAL HISTORY:   Past Surgical History:   Procedure Laterality Date     CARDIAC CATHETERIZATION       CATARACT EXTRACTION W/ INTRAOCULAR LENS IMPLANT Bilateral       SECTION        SECTION       COLONOSCOPY  7/15/2013    Tubular adenoma; repeat in 2018;Procedure: COMBINED COLONOSCOPY, SINGLE BIOPSY/POLYPECTOMY BY BIOPSY;;  Surgeon: Don King MD;  Tubular adenoma     COLONOSCOPY N/A 2020    Procedure: COLONOSCOPY;  Surgeon: Sid Amanda MD;  Location: [facility]     CORONARY STENT PLACEMENT  2004    RCA     DAVINCI HYSTERECTOMY TOTAL, BILATERAL SALPINGO-OOPHORECTOMY, COMBINED N/A 2019    Procedure: DaVinci Assisted Total Laparoscopic Hysterectomy, Removal Of Both Tubes And Ovaries;  Surgeon: Linh Cardoso MD;  Location: [facility] OR     EXTRACAPSULAR CATARACT EXTRATION WITH INTRAOCULAR LENS IMPLANT  11-10-09, 2-9-10    11-10-09 Lt, 2-9-10 Rt; Left eye 2012     HYSTERECTOMY TOTAL ABDOMINAL, BILATERAL SALPINGO-OOPHORECTOMY, COMBINED  2019    Procedure: DaVinci Assisted Total Laparoscopic Hysterectomy, Removal Of Both Tubes And Ovaries; Surgeon: Linh Cardoso MD; Location: [facility] OR       STENT, CORONARY, DELORES      RCA       FAMILY HISTORY:   Family History   Problem Relation Age of Onset     Hypertension Mother      Cerebrovascular Disease Mother      Glaucoma Father      Cancer Father      Diabetes Sister      Glaucoma Sister      Cancer - colorectal Other      Cerebrovascular Disease Other      Skin Cancer No family hx of      Melanoma No family hx of      Anesthesia Reaction No family hx of      Deep Vein Thrombosis (DVT) No family hx of       Arthritis Brother      Diabetes Sister      Glaucoma Sister        SOCIAL HISTORY:   Patient's living condition: lives in a skilled nursing facility    MEDICATIONS:  Post Discharge Medication Reconciliation Status: discharge medications reconciled and changed, per note/orders.  Current Outpatient Medications   Medication Sig Dispense Refill     acetaminophen (TYLENOL) 500 MG tablet Take 1,000 mg by mouth 3 times daily       ASPIRIN LOW DOSE 81 MG chewable tablet CHEW AND SWALLOW 1 TAB BY MOUTH ONCE DAILY IN THE MORNING  99     atorvastatin (LIPITOR) 80 MG tablet Take 1 tablet by mouth daily. Pt needs to have labs done prior to further refills. 90 tablet 0     bisacodyl (DULCOLAX) 10 MG suppository Place 10 mg rectally daily as needed for constipation       carboxymethylcellulose (REFRESH PLUS) 0.5 % SOLN 1 drop 3 times daily as needed.       CHLORTHALIDONE PO Take 25 mg by mouth every morning        diclofenac (VOLTAREN) 1 % topical gel Apply 2 g topically 4 times daily       dorzolamide-timolol (COSOPT) 2-0.5 % ophthalmic solution Place 1 drop into both eyes 2 times daily 10 mL 3     DULoxetine HCl (CYMBALTA PO) Take 90 mg by mouth every morning        folic acid (FOLVITE) 1 MG tablet Take 1 mg by mouth every morning        gabapentin (NEURONTIN) 300 MG capsule Take 400 mg by mouth 3 times daily       insulin glargine U-300 (TOUJEO) 300 UNIT/ML (1 units dial) pen 45 Units Inject subcu 45 units daily.       ketoconazole (NIZORAL) 2 % shampoo To entire wet scalp and ears and then wash off after 5 minutes three times a week. 240 mL 11     latanoprost (XALATAN) 0.005 % ophthalmic solution Place 1 drop into both eyes At Bedtime 5 mL 2     liraglutide (VICTOZA PEN) 18 MG/3ML solution Inject subcu 1.2mg daily. 9 mL 1     loperamide (IMODIUM A-D) 2 MG tablet Take 2 mg by mouth 4 times daily as needed for diarrhea       loratadine (CLARITIN) 10 MG tablet Take 10 mg by mouth as needed.       metFORMIN (GLUCOPHAGE-XR) 500 MG  "24 hr tablet Take 2 tablets (1,000 mg) by mouth daily (with dinner) 180 tablet 3     metoprolol tartrate (LOPRESSOR) 100 MG tablet Take 100 mg by mouth 2 times daily       nitroGLYCERIN (NITROSTAT) 0.4 MG SL tablet Place 0.4 mg under the tongue every 5 minutes as needed.       olopatadine (PATADAY) 0.2 % ophthalmic solution Place 1 drop into both eyes daily For itchy eyes 2.5 mL 3     omeprazole (PRILOSEC) 20 MG DR capsule Take 20 mg by mouth       pregabalin (LYRICA) 75 MG capsule Take 1 capsule (75 mg) by mouth 3 times daily 90 capsule 3     senna-docusate (SENOKOT-S/PERICOLACE) 8.6-50 MG tablet Take 2 tablets by mouth 2 times daily as needed for constipation 60 tablet 0     simethicone (MYLICON) 125 MG chewable tablet Take 125 mg by mouth 4 times daily as needed After meals and HS prn         ROS:  4 point ROS neg other than the symptoms noted above in the HPI. BIMS 15/15 at [facility]    PHYSICAL EXAM:  BP (!) 158/65   Pulse 69   Temp 97.2  F (36.2  C)   Resp 18   Ht 1.6 m (5' 3\")   Wt 71.7 kg (158 lb)   SpO2 99%   BMI 27.99 kg/m    Gen: laying in bed, alert, cooperative and in no acute distress  Resp: breathing non labored, no tachypnea  Ext: no LE edema  Neuro: CX II-XII grossly in tact  Psych: alert and oriented to self and general situation; normal affect     LABORATORY/IMAGING DATA:  Reviewed as per [facility] and/or [facility]    ASSESSMENT/PLAN:    Dysphagia  No aspiration. Saw [facility] in October with plan for manometry and EGD  -- has appt for EGD on 1/3/23 and follow up with [facility] on 1/6/23  -- SLP to follow    DM, Type II  Hgb A1c 6.9 in August. Sugars 160s-170s (am), 210-310 (nooon), 170s-310 (carmen)  -- Tuojeo insulin 45 units BID  -- liraglutide 1.2 mg subQ daily  -- metformin 1000 mg daily with dinner   -- follow sugars, A1c    HTN  SBPs 150s. HR 70s. BMP OK in Sept.   -- chlorthalidone 25 mg daily, metoprolol 100 mg BID   -- follow BPs and adjust medications as needed    Peripheral " Neuropathy  Chronic Pain Syndrome  Underlying DM  -- APAP 1000 mg TID, duloxetine 90 mg daily, gabapentin 400 mg TID, pregabalin 75 mg TID    HLD  -- atorvastatin 80 mg daily    Glaucoma  -- dorzolamide-timolol, latanoprost, olpatadine drops     Slow Transit Constipation  -- bisacodyl 10 mg supp daily PRN, Senna-S 2 tabs BID PRN  -- adjust bowel regimen as needed    GERD  -- omeprazole 20 mg daily      Electronically signed by:  Tami Orourke MD

## 2022-12-08 NOTE — LETTER
12/8/2022        RE: Cristal Preciado  [facility]  [address]  [place] [address]        [facility] GERIATRICS  INITIAL VISIT NOTE  December 8, 2022      PRIMARY CARE PROVIDER AND CLINIC:  Aditi Allen [address] / [place] [address]    [facility] Medical Record Number:  [id number]  Place of Service where encounter took place:  [facility] () [[id number]]    Chief Complaint   Patient presents with     Establish Care       HPI:    Cristal Preciado is a 72 year old  (1950) female seen today at [facility]. Medical history is notable for DM, PAD, CKD, peripheral neuropathy as well as dysphagia. She was living at [facility] since October 2011 but has moved to this facility as her daughter recently was admitted here for long term care. She was admitted to this facility for  medical management and nursing care.      History obtained from: facility chart records, facility staff, patient report and [facility] chart review.      Today, Ms. Preciado is seen in her room laying in bed. She is hungry - sounds like she was missed for breakfast? She will be moving into the same room as her daughter, April, later today. She moved to this facility to be closer to her. Discussed upcoming GI appointments. No concerns today per discussion with nursing.     CODE STATUS: CPR/Full code     ALLERGIES:  Allergies   Allergen Reactions     Dust Mites Other (See Comments)     Sneezing runny eyes and nose.     Food Allergy Formula Hives     Mountain Dew and Walnuts     Pollen Extract Other (See Comments)     Sneezing runny eyes and nose.       PAST MEDICAL HISTORY:   Past Medical History:   Diagnosis Date     AION (anterior ischaemic optic neuropathy), left eye     NAION LE     CAD (coronary artery disease) 3/2009    [facility]; Angio 2013 [facility]- normal coronary arteries     Cataract      CVA (cerebral vascular accident) (H)     admitted  at [facility]     on Cymbalta     Diabetes mellitus, type 2 (H)      Diabetic nephropathy (H)      Diabetic neuropathy (H)     severe     Diabetic retinopathy (H)      GERD (gastroesophageal reflux disease)      Hyperlipidemia      Hypertension     ECHO 2013, TDS, NL EF     POAG (primary open-angle glaucoma)     adv BE     Seasonal allergies      Tubular adenoma of colon     repeat colonoscopy in        PAST SURGICAL HISTORY:   Past Surgical History:   Procedure Laterality Date     CARDIAC CATHETERIZATION       CATARACT EXTRACTION W/ INTRAOCULAR LENS IMPLANT Bilateral       SECTION        SECTION       COLONOSCOPY  7/15/2013    Tubular adenoma; repeat in 2018;Procedure: COMBINED COLONOSCOPY, SINGLE BIOPSY/POLYPECTOMY BY BIOPSY;;  Surgeon: Don King MD;  Tubular adenoma     COLONOSCOPY N/A 2020    Procedure: COLONOSCOPY;  Surgeon: Sid Amanda MD;  Location: [facility]     CORONARY STENT PLACEMENT  2004    RCA     DAVINCI HYSTERECTOMY TOTAL, BILATERAL SALPINGO-OOPHORECTOMY, COMBINED N/A 2019    Procedure: DaVinci Assisted Total Laparoscopic Hysterectomy, Removal Of Both Tubes And Ovaries;  Surgeon: Linh Cardoso MD;  [facility]     EXTRACAPSULAR CATARACT EXTRATION WITH INTRAOCULAR LENS IMPLANT  11-10-09, -9-10    11-10-09 Lt, 2-9-10 Rt; Left eye 2012     HYSTERECTOMY TOTAL ABDOMINAL, BILATERAL SALPINGO-OOPHORECTOMY, COMBINED  2019    Procedure: DaVinci Assisted Total Laparoscopic Hysterectomy, Removal Of Both Tubes And Ovaries; Surgeon: Linh Cardoso MD; Location: [facility] OR       STENT, CORONARY, DELORES      RCA       FAMILY HISTORY:   Family History   Problem Relation Age of Onset     Hypertension Mother      Cerebrovascular Disease Mother      Glaucoma Father      Cancer Father      Diabetes Sister      Glaucoma Sister      Cancer - colorectal Other      Cerebrovascular Disease Other      Skin Cancer No family hx of       Melanoma No family hx of      Anesthesia Reaction No family hx of      Deep Vein Thrombosis (DVT) No family hx of      Arthritis Brother      Diabetes Sister      Glaucoma Sister        SOCIAL HISTORY:   Patient's living condition: lives in a skilled nursing facility    MEDICATIONS:  Post Discharge Medication Reconciliation Status: discharge medications reconciled and changed, per note/orders.  Current Outpatient Medications   Medication Sig Dispense Refill     acetaminophen (TYLENOL) 500 MG tablet Take 1,000 mg by mouth 3 times daily       ASPIRIN LOW DOSE 81 MG chewable tablet CHEW AND SWALLOW 1 TAB BY MOUTH ONCE DAILY IN THE MORNING  99     atorvastatin (LIPITOR) 80 MG tablet Take 1 tablet by mouth daily. Pt needs to have labs done prior to further refills. 90 tablet 0     bisacodyl (DULCOLAX) 10 MG suppository Place 10 mg rectally daily as needed for constipation       carboxymethylcellulose (REFRESH PLUS) 0.5 % SOLN 1 drop 3 times daily as needed.       CHLORTHALIDONE PO Take 25 mg by mouth every morning        diclofenac (VOLTAREN) 1 % topical gel Apply 2 g topically 4 times daily       dorzolamide-timolol (COSOPT) 2-0.5 % ophthalmic solution Place 1 drop into both eyes 2 times daily 10 mL 3     DULoxetine HCl (CYMBALTA PO) Take 90 mg by mouth every morning        folic acid (FOLVITE) 1 MG tablet Take 1 mg by mouth every morning        gabapentin (NEURONTIN) 300 MG capsule Take 400 mg by mouth 3 times daily       insulin glargine U-300 (TOUJEO) 300 UNIT/ML (1 units dial) pen 45 Units Inject subcu 45 units daily.       ketoconazole (NIZORAL) 2 % shampoo To entire wet scalp and ears and then wash off after 5 minutes three times a week. 240 mL 11     latanoprost (XALATAN) 0.005 % ophthalmic solution Place 1 drop into both eyes At Bedtime 5 mL 2     liraglutide (VICTOZA PEN) 18 MG/3ML solution Inject subcu 1.2mg daily. 9 mL 1     loperamide (IMODIUM A-D) 2 MG tablet Take 2 mg by mouth 4 times daily as needed for  "diarrhea       loratadine (CLARITIN) 10 MG tablet Take 10 mg by mouth as needed.       metFORMIN (GLUCOPHAGE-XR) 500 MG 24 hr tablet Take 2 tablets (1,000 mg) by mouth daily (with dinner) 180 tablet 3     metoprolol tartrate (LOPRESSOR) 100 MG tablet Take 100 mg by mouth 2 times daily       nitroGLYCERIN (NITROSTAT) 0.4 MG SL tablet Place 0.4 mg under the tongue every 5 minutes as needed.       olopatadine (PATADAY) 0.2 % ophthalmic solution Place 1 drop into both eyes daily For itchy eyes 2.5 mL 3     omeprazole (PRILOSEC) 20 MG DR capsule Take 20 mg by mouth       pregabalin (LYRICA) 75 MG capsule Take 1 capsule (75 mg) by mouth 3 times daily 90 capsule 3     senna-docusate (SENOKOT-S/PERICOLACE) 8.6-50 MG tablet Take 2 tablets by mouth 2 times daily as needed for constipation 60 tablet 0     simethicone (MYLICON) 125 MG chewable tablet Take 125 mg by mouth 4 times daily as needed After meals and HS prn         ROS:  4 point ROS neg other than the symptoms noted above in the HPI. BIMS 15/15 at [facility]    PHYSICAL EXAM:  BP (!) 158/65   Pulse 69   Temp 97.2  F (36.2  C)   Resp 18   Ht 1.6 m (5' 3\")   Wt 71.7 kg (158 lb)   SpO2 99%   BMI 27.99 kg/m    Gen: laying in bed, alert, cooperative and in no acute distress  Resp: breathing non labored, no tachypnea  Ext: no LE edema  Neuro: CX II-XII grossly in tact  Psych: alert and oriented to self and general situation; normal affect     LABORATORY/IMAGING DATA:  Reviewed as per [facility] and/or [facility]    ASSESSMENT/PLAN:    Dysphagia  No aspiration. Saw [facility] in October with plan for manometry and EGD  -- has appt for EGD on 1/3/23 and follow up with [facility] on 1/6/23  -- SLP to follow    DM, Type II  Hgb A1c 6.9 in August. Sugars 160s-170s (am), 210-310 (nooon), 170s-310 (carmen)  -- Tuojeo insulin 45 units BID  -- liraglutide 1.2 mg subQ daily  -- metformin 1000 mg daily with dinner   -- follow sugars, A1c    HTN  SBPs 150s. HR 70s. BMP OK in Sept.   -- " chlorthalidone 25 mg daily, metoprolol 100 mg BID   -- follow BPs and adjust medications as needed    Peripheral Neuropathy  Chronic Pain Syndrome  Underlying DM  -- APAP 1000 mg TID, duloxetine 90 mg daily, gabapentin 400 mg TID, pregabalin 75 mg TID    HLD  -- atorvastatin 80 mg daily    Glaucoma  -- dorzolamide-timolol, latanoprost, olpatadine drops     Slow Transit Constipation  -- bisacodyl 10 mg supp daily PRN, Senna-S 2 tabs BID PRN  -- adjust bowel regimen as needed    GERD  -- omeprazole 20 mg daily      Electronically signed by:  Tami Orourke MD                            Sincerely,        Tami Orourke MD

## 2022-12-20 NOTE — LETTER
12/20/2022        RE: Cristal Preciado  [address] [address]        [facility] [address] [place] [address]  Phone: [phone]  Fax: [phone]  Primary Provider: Aditi Allen  Pre-op Performing Provider: DEBI KHAN    PREOPERATIVE EVALUATION:  Today's date: 12/20/2022    Cristal Preciado is a 72 year old female who presents for a preoperative evaluation.    Surgical Information:  Surgery/Procedure: Upper Endoscopy  Surgery Location: [facility]   Surgeon: Shawn Olivas MD [facility]  Surgery Date: 1/3/23  Time of Surgery: TBD  Where patient plans to recover: At a nursing home  Fax number for surgical facility: [phone]    Type of Anesthesia Anticipated: MAC    Subjective     HPI related to upcoming procedure:   Ms. Preciado was referred to [facility] while living at her prior facility. Review of [facility] Note from 10/10/22 indicates referral was for dsyphagia and FTT from poor PO intake. Recent video swallow with dysphagia, penetration with thin and mildly thick liquids, low tongue retraction and delayed swallow response. Recommendation from that appointment was for an upper endoscopy and esophageal manometry.     Today, Ms. Preciado is seen in her room sitting up in bed. No acute concerns today - no chest pain, dyspnea, URI symptoms. No belly pain.     Dysphagia  Plan for EGD as above  -- stop eating solid foods at 2345 on 1/2/23; OK for clear liquids (nothing red, dairy, juice with pulp or products containing oil)  -- can continue clear liquids up until 3 hours prior to EGD, then stop consuming all liquids and become NPO  -- OK to take all morning medications     DM, Type II  Hgb A1c 6.9 in August. Sugars 160s-180s (am), 140s-260s (nooon), 130s-210 (carmen) and 100s-120s (hs)  -- Tuojeo insulin 45 units daily in the morning  -- liraglutide 1.2 mg subQ daily  -- metformin 1000 mg daily with dinner   -- follow sugars,  A1c    **ON MORNING OF EGD**   -- hold the Victoza on 1/3/22   -- decrease the Teujeo from 45 units to 30 units on 1/3/22      HTN  SBPs 150s. HR 60s-70s. BMP OK in Sept.   -- chlorthalidone 25 mg daily, metoprolol 100 mg BID   -- follow BPs and adjust medications as needed     Peripheral Neuropathy  Chronic Pain Syndrome  Underlying DM  -- APAP 1000 mg TID, duloxetine 90 mg daily, gabapentin 400 mg TID, pregabalin 75 mg TID    CKD, Stage III  Underlying DM and HTN. Baseline Cr 0.9-1.2. Cr 0.96 in Sept.   -- avoid nephrotoxic medications  -- BMP q3-6 mos per goals of care     HLD  -- atorvastatin 80 mg daily     Glaucoma  -- dorzolamide-timolol, latanoprost, olpatadine drops      Slow Transit Constipation  -- bisacodyl 10 mg supp daily PRN, Senna-S 2 tabs BID PRN  -- adjust bowel regimen as needed     GERD  -- omeprazole 20 mg daily    1. YES - Have you ever had a heart attack or stroke? STEMI with RCA Stent (2009); CVA in ?2010  2. YES - Have you ever had surgery on your heart or blood vessels, such as a stent, coronary (heart) bypass, or surgery on an artery in the head, neck, heart, or legs? RCA Stent (2009)  3. No - Do you have chest pain when you are physically active?  4. No - Do you have a history of heart failure?  5. No - Do you currently have a cold, bronchitis, or symptoms of other respiratory (head and chest) infections?  6. No - Do you have a cough, shortness of breath, or wheezing?  7. No - Do you or anyone in your family have a history of blood clots?  8. No - Do you or anyone in your family have a serious bleeding problem, such as long-lasting bleeding after surgeries or cuts?  9. No - Have you ever had anemia or been told to take iron pills?  10. No - Have you had any abnormal blood loss such as black, tarry or bloody stools, or abnormal vaginal bleeding?  11. No - Have you ever had a blood transfusion?  12. Yes - Are you willing to have a blood transfusion if it is medically needed before, during,  or after your surgery?  13. No - Have you or anyone in your family ever had problems with anesthesia (sedation for surgery)?  14. No - Do you have sleep apnea, excessive snoring, or daytime drowsiness?   15. No - Do you have any artifical heart valves or other implanted medical devices, such as a pacemaker, defibrillator, or continuous glucose monitor?  16. No - Do you have any artifical joints?  17. No - Are you allergic to latex?  18. No - Is there any chance that you may be pregnant?      Health Care Directive:  Patient has a Health Care Directive or Living Will: Advance Directive received and scanned. Click on Code in the patient header to view.    Preoperative Review of :   reviewed - no record of controlled substances prescribed.    Review of Systems  4 point ROS negative except as above in HPI    PROBLEM LIST  Patient Active Problem List    Diagnosis Date Noted     Chronic kidney disease, stage 3 (H) 05/10/2021     Priority: Medium     Hypomagnesemia 08/23/2019     Priority: Medium     S/P hysterectomy 08/05/2019     Priority: Medium     Acute chest pain 04/24/2018     Priority: Medium     Neoplasm of uncertain behavior of skin 02/25/2018     Priority: Medium     Chronic dermatitis of hands 08/30/2017     Priority: Medium     Uncontrolled type 2 diabetes mellitus 07/20/2016     Priority: Medium     Gout 12/28/2015     Priority: Medium     Diabetic peripheral neuropathy associated with type 2 diabetes mellitus (H) 12/02/2015     Priority: Medium     CKD (chronic kidney disease) 12/02/2015     Priority: Medium     Venous stasis dermatitis of both lower extremities 11/02/2015     Priority: Medium     History of coronary artery disease 06/10/2015     Priority: Medium     Dermatitis, seborrheic 05/13/2015     Priority: Medium     Eczematous dermatitis 05/13/2015     Priority: Medium     Leg weakness 05/04/2015     Priority: Medium     Tubular adenoma of colon      Priority: Medium     repeat colonoscopy in  2018       Seasonal allergies      Priority: Medium     Depression      Priority: Medium     Morbid obesity (H) 2012     Priority: Medium     Anemia 2012     Priority: Medium     CVA (cerebral vascular accident) (H)      Priority: Medium     admitted at [facility]       Hyperlipidemia      Priority: Medium     Cataract 2011     Priority: Medium     Utility update for deleted IMO code  Imo Update utility       CAD (coronary artery disease) 2011     Priority: Medium     Diabetic nephropathy (H) 2011     Priority: Medium     Diabetic neuropathy (H) 2011     Priority: Medium     severe       Diabetic retinopathy (H) 2011     Priority: Medium     GERD (gastroesophageal reflux disease) 2011     Priority: Medium     Glaucoma 2011     Priority: Medium     Hypertension 2011     Priority: Medium     Diabetes mellitus with peripheral vascular disease (H)      Priority: Medium      PAST MEDICAL HISTORY  Past Medical History:   Diagnosis Date     AION (anterior ischaemic optic neuropathy), left eye     NAION LE     CAD (coronary artery disease) 3/2009    [facility]; Angio  UM- normal coronary arteries     Cataract      CVA (cerebral vascular accident) (H)     admitted at [facility]     Depression     on Cymbalta     Diabetes mellitus, type 2 (H)      Diabetic nephropathy (H)      Diabetic neuropathy (H)     severe     Diabetic retinopathy (H)      GERD (gastroesophageal reflux disease)      Hyperlipidemia      Hypertension     ECHO , TDS, NL EF     POAG (primary open-angle glaucoma)     adv BE     Seasonal allergies      Tubular adenoma of colon     repeat colonoscopy in 2018     Past Surgical History:   Procedure Laterality Date     CARDIAC CATHETERIZATION       CATARACT EXTRACTION W/ INTRAOCULAR LENS IMPLANT Bilateral       SECTION        SECTION       COLONOSCOPY  7/15/2013    Tubular adenoma; repeat in 2018;Procedure: COMBINED  COLONOSCOPY, SINGLE BIOPSY/POLYPECTOMY BY BIOPSY;;  Surgeon: Don King MD;  Tubular adenoma     COLONOSCOPY N/A 1/24/2020    Procedure: COLONOSCOPY;  Surgeon: Sid Amanda MD;  Location: [facility]     CORONARY STENT PLACEMENT  03/19/2004    RCA     DAVINCI HYSTERECTOMY TOTAL, BILATERAL SALPINGO-OOPHORECTOMY, COMBINED N/A 8/5/2019    Procedure: DaVinci Assisted Total Laparoscopic Hysterectomy, Removal Of Both Tubes And Ovaries;  Surgeon: Linh Cardoso MD;  Location: [facility] OR     EXTRACAPSULAR CATARACT EXTRATION WITH INTRAOCULAR LENS IMPLANT  11-10-09, 2-9-10    11-10-09 Lt, 2-9-10 Rt; Left eye 8/2012     HYSTERECTOMY TOTAL ABDOMINAL, BILATERAL SALPINGO-OOPHORECTOMY, COMBINED  08/05/2019    Procedure: DaVinci Assisted Total Laparoscopic Hysterectomy, Removal Of Both Tubes And Ovaries; Surgeon: Linh Cardoso MD; Location: [facility] OR       STENT, CORONARY, DELORES  2009    RCA       CURRENT MEDICATIONS  Current Outpatient Medications   Medication Sig Dispense Refill     acetaminophen (TYLENOL) 500 MG tablet Take 1,000 mg by mouth 3 times daily       ASPIRIN LOW DOSE 81 MG chewable tablet CHEW AND SWALLOW 1 TAB BY MOUTH ONCE DAILY IN THE MORNING  99     atorvastatin (LIPITOR) 80 MG tablet Take 1 tablet by mouth daily. Pt needs to have labs done prior to further refills. 90 tablet 0     bisacodyl (DULCOLAX) 10 MG suppository Place 10 mg rectally daily as needed for constipation       CHLORTHALIDONE PO Take 25 mg by mouth every morning        diclofenac (VOLTAREN) 1 % topical gel Apply 2 g topically 4 times daily       dorzolamide-timolol (COSOPT) 2-0.5 % ophthalmic solution Place 1 drop into both eyes 2 times daily 10 mL 3     DULoxetine HCl (CYMBALTA PO) Take 90 mg by mouth every morning        folic acid (FOLVITE) 1 MG tablet Take 1 mg by mouth every morning        gabapentin (NEURONTIN) 300 MG capsule Take 400 mg by mouth 3 times daily       hydrocortisone, Perianal, (HYDROCORTISONE) 2.5 % cream Place  rectally 2 times daily as needed for hemorrhoids 60 g 0     insulin glargine U-300 (TOUJEO) 300 UNIT/ML (1 units dial) pen 45 Units Inject subcu 45 units daily.       ketoconazole (NIZORAL) 2 % shampoo To entire wet scalp and ears and then wash off after 5 minutes three times a week. 240 mL 11     latanoprost (XALATAN) 0.005 % ophthalmic solution Place 1 drop into both eyes At Bedtime 5 mL 2     liraglutide (VICTOZA PEN) 18 MG/3ML solution Inject subcu 1.2mg daily. 9 mL 1     loperamide (IMODIUM A-D) 2 MG tablet Take 2 mg by mouth 4 times daily as needed for diarrhea       loratadine (CLARITIN) 10 MG tablet Take 10 mg by mouth as needed.       metFORMIN (GLUCOPHAGE-XR) 500 MG 24 hr tablet Take 2 tablets (1,000 mg) by mouth daily (with dinner) 180 tablet 3     metoprolol tartrate (LOPRESSOR) 100 MG tablet Take 100 mg by mouth 2 times daily       nitroGLYCERIN (NITROSTAT) 0.4 MG SL tablet Place 0.4 mg under the tongue every 5 minutes as needed.       olopatadine (PATADAY) 0.2 % ophthalmic solution Place 1 drop into both eyes daily For itchy eyes 2.5 mL 3     omeprazole (PRILOSEC) 20 MG DR capsule Take 20 mg by mouth       polyvinyl alcohol (LIQUIFILM TEARS) 1.4 % ophthalmic solution Place 1 drop into both eyes 3 times daily       pregabalin (LYRICA) 75 MG capsule Take 1 capsule (75 mg) by mouth 3 times daily 90 capsule 3     senna-docusate (SENOKOT-S/PERICOLACE) 8.6-50 MG tablet Take 2 tablets by mouth 2 times daily as needed for constipation 60 tablet 0     simethicone (MYLICON) 125 MG chewable tablet Take 125 mg by mouth 4 times daily as needed After meals and HS prn       White Petrolatum-Mineral Oil (REFRESH P.M.) OINT Apply to eye nightly as needed       ALLERGIES  Allergies   Allergen Reactions     Dust Mites Other (See Comments)     Sneezing runny eyes and nose.     Food Allergy Formula Hives     Mountain Dew and Walnuts     Pollen Extract Other (See Comments)     Sneezing runny eyes and nose.      SOCIAL  "HISTORY  Social History     Tobacco Use     Smoking status: Former     Packs/day: 1.50     Years: 45.00     Pack years: 67.50     Types: Cigarettes     Start date: 1968     Quit date: 3/6/2013     Years since quittin.7     Smokeless tobacco: Never   Substance Use Topics     Alcohol use: Not Currently     FAMILY HISTORY  Family History   Problem Relation Age of Onset     Hypertension Mother      Cerebrovascular Disease Mother      Glaucoma Father      Cancer Father      Diabetes Sister      Glaucoma Sister      Cancer - colorectal Other      Cerebrovascular Disease Other      Skin Cancer No family hx of      Melanoma No family hx of      Anesthesia Reaction No family hx of      Deep Vein Thrombosis (DVT) No family hx of      Arthritis Brother      Diabetes Sister      Glaucoma Sister        Objective     Physical Exam   BP (!) 156/62   Pulse 69   Temp 97.2  F (36.2  C)   Resp 18   Ht 1.6 m (5' 3\")   Wt 69.1 kg (152 lb 4.8 oz)   SpO2 99%   BMI 26.98 kg/m    Gen: sitting up in bed, alert and in no acute distress  Card: RRR, S1, S2, no murmurs  Resp: lungs clear to auscultation bilaterally, no crackles or wheezes anteriorly or laterally   Ext: no LE edema  Neuro: CX II-XII grossly in tact  Psych: alert and oriented to self and general situation; flat affect    LABS:  Recent Labs   Lab Test 22  0540   HGB 10.7*         POTASSIUM 3.6   CR 0.96*   A1C  --      Diagnostics:  No labs were ordered during this visit.   No EKG required for low risk surgery (cataract, skin procedure, breast biopsy, etc).    Revised Cardiac Risk Index (RCRI):  The patient has the following serious cardiovascular risks for perioperative complications:   - Coronary Artery Disease (MI, positive stress test, angina, Qs on EKG) = 1 point   - Cerebrovascular Disease (TIA or CVA) = 1 point   - Diabetes Mellitus (on Insulin) = 1 point     RCRI Interpretation: 2 points: Class III (moderate risk - 6.6% complication rate) - " this is MAC, not general and a low risk procedure       Signed Electronically by: Tami Orourke MD  Copy of this evaluation report is provided to requesting physician.              Sincerely,        Tami Orourke MD

## 2022-12-20 NOTE — PROGRESS NOTES
[facility]  [address] [place] [address]  Phone: [phone]  Fax: [phone]  Primary Provider: Aditi Allen  Pre-op Performing Provider: DEBI KHAN    PREOPERATIVE EVALUATION:  Today's date: 12/20/2022    Cristal Preciado is a 72 year old female who presents for a preoperative evaluation.    Surgical Information:  Surgery/Procedure: Upper Endoscopy  Surgery Location: [facility]   Surgeon: Shawn Olivas MD [facility]  Surgery Date: 1/3/23  Time of Surgery: TBD  Where patient plans to recover: At a nursing home  Fax number for surgical facility: [phone]    Type of Anesthesia Anticipated: MAC    Subjective     HPI related to upcoming procedure:   Ms. Preciado was referred to [facility] while living at her prior facility. Review of [facility] Note from 10/10/22 indicates referral was for dsyphagia and FTT from poor PO intake. Recent video swallow with dysphagia, penetration with thin and mildly thick liquids, low tongue retraction and delayed swallow response. Recommendation from that appointment was for an upper endoscopy and esophageal manometry.     Today, Ms. Preciado is seen in her room sitting up in bed. No acute concerns today - no chest pain, dyspnea, URI symptoms. No belly pain.     Dysphagia  Plan for EGD as above  -- stop eating solid foods at 2345 on 1/2/23; OK for clear liquids (nothing red, dairy, juice with pulp or products containing oil)  -- can continue clear liquids up until 3 hours prior to EGD, then stop consuming all liquids and become NPO  -- OK to take all morning medications     DM, Type II  Hgb A1c 6.9 in August. Sugars 160s-180s (am), 140s-260s (nooon), 130s-210 (carmen) and 100s-120s (hs)  -- Tuojeo insulin 45 units daily in the morning  -- liraglutide 1.2 mg subQ daily  -- metformin 1000 mg daily with dinner   -- follow sugars, A1c    **ON MORNING OF EGD**   -- hold the Victoza on 1/3/22   -- decrease the Teujeo from 45 units to 30 units on 1/3/22       HTN  SBPs 150s. HR 60s-70s. BMP OK in Sept.   -- chlorthalidone 25 mg daily, metoprolol 100 mg BID   -- follow BPs and adjust medications as needed     Peripheral Neuropathy  Chronic Pain Syndrome  Underlying DM  -- APAP 1000 mg TID, duloxetine 90 mg daily, gabapentin 400 mg TID, pregabalin 75 mg TID    CKD, Stage III  Underlying DM and HTN. Baseline Cr 0.9-1.2. Cr 0.96 in Sept.   -- avoid nephrotoxic medications  -- BMP q3-6 mos per goals of care     HLD  -- atorvastatin 80 mg daily     Glaucoma  -- dorzolamide-timolol, latanoprost, olpatadine drops      Slow Transit Constipation  -- bisacodyl 10 mg supp daily PRN, Senna-S 2 tabs BID PRN  -- adjust bowel regimen as needed     GERD  -- omeprazole 20 mg daily    1. YES - Have you ever had a heart attack or stroke? STEMI with RCA Stent (2009); CVA in ?2010  2. YES - Have you ever had surgery on your heart or blood vessels, such as a stent, coronary (heart) bypass, or surgery on an artery in the head, neck, heart, or legs? RCA Stent (2009)  3. No - Do you have chest pain when you are physically active?  4. No - Do you have a history of heart failure?  5. No - Do you currently have a cold, bronchitis, or symptoms of other respiratory (head and chest) infections?  6. No - Do you have a cough, shortness of breath, or wheezing?  7. No - Do you or anyone in your family have a history of blood clots?  8. No - Do you or anyone in your family have a serious bleeding problem, such as long-lasting bleeding after surgeries or cuts?  9. No - Have you ever had anemia or been told to take iron pills?  10. No - Have you had any abnormal blood loss such as black, tarry or bloody stools, or abnormal vaginal bleeding?  11. No - Have you ever had a blood transfusion?  12. Yes - Are you willing to have a blood transfusion if it is medically needed before, during, or after your surgery?  13. No - Have you or anyone in your family ever had problems with anesthesia (sedation for  surgery)?  14. No - Do you have sleep apnea, excessive snoring, or daytime drowsiness?   15. No - Do you have any artifical heart valves or other implanted medical devices, such as a pacemaker, defibrillator, or continuous glucose monitor?  16. No - Do you have any artifical joints?  17. No - Are you allergic to latex?  18. No - Is there any chance that you may be pregnant?      Health Care Directive:  Patient has a Health Care Directive or Living Will: Advance Directive received and scanned. Click on Code in the patient header to view.    Preoperative Review of :   reviewed - no record of controlled substances prescribed.    Review of Systems  4 point ROS negative except as above in HPI    PROBLEM LIST  Patient Active Problem List    Diagnosis Date Noted     Chronic kidney disease, stage 3 (H) 05/10/2021     Priority: Medium     Hypomagnesemia 08/23/2019     Priority: Medium     S/P hysterectomy 08/05/2019     Priority: Medium     Acute chest pain 04/24/2018     Priority: Medium     Neoplasm of uncertain behavior of skin 02/25/2018     Priority: Medium     Chronic dermatitis of hands 08/30/2017     Priority: Medium     Uncontrolled type 2 diabetes mellitus 07/20/2016     Priority: Medium     Gout 12/28/2015     Priority: Medium     Diabetic peripheral neuropathy associated with type 2 diabetes mellitus (H) 12/02/2015     Priority: Medium     CKD (chronic kidney disease) 12/02/2015     Priority: Medium     Venous stasis dermatitis of both lower extremities 11/02/2015     Priority: Medium     History of coronary artery disease 06/10/2015     Priority: Medium     Dermatitis, seborrheic 05/13/2015     Priority: Medium     Eczematous dermatitis 05/13/2015     Priority: Medium     Leg weakness 05/04/2015     Priority: Medium     Tubular adenoma of colon      Priority: Medium     repeat colonoscopy in 2018       Seasonal allergies      Priority: Medium     Depression      Priority: Medium     Morbid obesity (H)  2012     Priority: Medium     Anemia 2012     Priority: Medium     CVA (cerebral vascular accident) (H)      Priority: Medium     admitted at [facility]       Hyperlipidemia      Priority: Medium     Cataract 2011     Priority: Medium     Utility update for deleted IMO code  Imo Update utility       CAD (coronary artery disease) 2011     Priority: Medium     Diabetic nephropathy (H) 2011     Priority: Medium     Diabetic neuropathy (H) 2011     Priority: Medium     severe       Diabetic retinopathy (H) 2011     Priority: Medium     GERD (gastroesophageal reflux disease) 2011     Priority: Medium     Glaucoma 2011     Priority: Medium     Hypertension 2011     Priority: Medium     Diabetes mellitus with peripheral vascular disease (H)      Priority: Medium      PAST MEDICAL HISTORY  Past Medical History:   Diagnosis Date     AION (anterior ischaemic optic neuropathy), left eye     NAION LE     CAD (coronary artery disease) 3/2009    [facility]; Angio  UM- normal coronary arteries     Cataract      CVA (cerebral vascular accident) (H)     admitted at [facility]     Depression     on Cymbalta     Diabetes mellitus, type 2 (H)      Diabetic nephropathy (H)      Diabetic neuropathy (H)     severe     Diabetic retinopathy (H)      GERD (gastroesophageal reflux disease)      Hyperlipidemia      Hypertension     ECHO , TDS, NL EF     POAG (primary open-angle glaucoma)     adv BE     Seasonal allergies      Tubular adenoma of colon     repeat colonoscopy in      Past Surgical History:   Procedure Laterality Date     CARDIAC CATHETERIZATION       CATARACT EXTRACTION W/ INTRAOCULAR LENS IMPLANT Bilateral       SECTION        SECTION       COLONOSCOPY  7/15/2013    Tubular adenoma; repeat in 2018;Procedure: COMBINED COLONOSCOPY, SINGLE BIOPSY/POLYPECTOMY BY BIOPSY;;  Surgeon: Don King MD;  Tubular adenoma      COLONOSCOPY N/A 1/24/2020    Procedure: COLONOSCOPY;  Surgeon: Sid Amanda MD;  Location: [facility] GI     CORONARY STENT PLACEMENT  03/19/2004    RCA     DAVINCI HYSTERECTOMY TOTAL, BILATERAL SALPINGO-OOPHORECTOMY, COMBINED N/A 8/5/2019    Procedure: DaVinci Assisted Total Laparoscopic Hysterectomy, Removal Of Both Tubes And Ovaries;  Surgeon: Linh Cardoso MD;  Location: [facility] OR     EXTRACAPSULAR CATARACT EXTRATION WITH INTRAOCULAR LENS IMPLANT  11-10-09, 2-9-10    11-10-09 Lt, 2-9-10 Rt; Left eye 8/2012     HYSTERECTOMY TOTAL ABDOMINAL, BILATERAL SALPINGO-OOPHORECTOMY, COMBINED  08/05/2019    Procedure: DaVinci Assisted Total Laparoscopic Hysterectomy, Removal Of Both Tubes And Ovaries; Surgeon: Linh Cardoso MD; Location: [facility] OR       STENT, CORONARY, DELORES  2009    RCA       CURRENT MEDICATIONS  Current Outpatient Medications   Medication Sig Dispense Refill     acetaminophen (TYLENOL) 500 MG tablet Take 1,000 mg by mouth 3 times daily       ASPIRIN LOW DOSE 81 MG chewable tablet CHEW AND SWALLOW 1 TAB BY MOUTH ONCE DAILY IN THE MORNING  99     atorvastatin (LIPITOR) 80 MG tablet Take 1 tablet by mouth daily. Pt needs to have labs done prior to further refills. 90 tablet 0     bisacodyl (DULCOLAX) 10 MG suppository Place 10 mg rectally daily as needed for constipation       CHLORTHALIDONE PO Take 25 mg by mouth every morning        diclofenac (VOLTAREN) 1 % topical gel Apply 2 g topically 4 times daily       dorzolamide-timolol (COSOPT) 2-0.5 % ophthalmic solution Place 1 drop into both eyes 2 times daily 10 mL 3     DULoxetine HCl (CYMBALTA PO) Take 90 mg by mouth every morning        folic acid (FOLVITE) 1 MG tablet Take 1 mg by mouth every morning        gabapentin (NEURONTIN) 300 MG capsule Take 400 mg by mouth 3 times daily       hydrocortisone, Perianal, (HYDROCORTISONE) 2.5 % cream Place rectally 2 times daily as needed for hemorrhoids 60 g 0     insulin glargine U-300 (TOUJEO) 300 UNIT/ML (1  units dial) pen 45 Units Inject subcu 45 units daily.       ketoconazole (NIZORAL) 2 % shampoo To entire wet scalp and ears and then wash off after 5 minutes three times a week. 240 mL 11     latanoprost (XALATAN) 0.005 % ophthalmic solution Place 1 drop into both eyes At Bedtime 5 mL 2     liraglutide (VICTOZA PEN) 18 MG/3ML solution Inject subcu 1.2mg daily. 9 mL 1     loperamide (IMODIUM A-D) 2 MG tablet Take 2 mg by mouth 4 times daily as needed for diarrhea       loratadine (CLARITIN) 10 MG tablet Take 10 mg by mouth as needed.       metFORMIN (GLUCOPHAGE-XR) 500 MG 24 hr tablet Take 2 tablets (1,000 mg) by mouth daily (with dinner) 180 tablet 3     metoprolol tartrate (LOPRESSOR) 100 MG tablet Take 100 mg by mouth 2 times daily       nitroGLYCERIN (NITROSTAT) 0.4 MG SL tablet Place 0.4 mg under the tongue every 5 minutes as needed.       olopatadine (PATADAY) 0.2 % ophthalmic solution Place 1 drop into both eyes daily For itchy eyes 2.5 mL 3     omeprazole (PRILOSEC) 20 MG DR capsule Take 20 mg by mouth       polyvinyl alcohol (LIQUIFILM TEARS) 1.4 % ophthalmic solution Place 1 drop into both eyes 3 times daily       pregabalin (LYRICA) 75 MG capsule Take 1 capsule (75 mg) by mouth 3 times daily 90 capsule 3     senna-docusate (SENOKOT-S/PERICOLACE) 8.6-50 MG tablet Take 2 tablets by mouth 2 times daily as needed for constipation 60 tablet 0     simethicone (MYLICON) 125 MG chewable tablet Take 125 mg by mouth 4 times daily as needed After meals and HS prn       White Petrolatum-Mineral Oil (REFRESH P.M.) OINT Apply to eye nightly as needed       ALLERGIES  Allergies   Allergen Reactions     Dust Mites Other (See Comments)     Sneezing runny eyes and nose.     Food Allergy Formula Hives     Mountain Dew and Walnuts     Pollen Extract Other (See Comments)     Sneezing runny eyes and nose.      SOCIAL HISTORY  Social History     Tobacco Use     Smoking status: Former     Packs/day: 1.50     Years: 45.00     Pack  "years: 67.50     Types: Cigarettes     Start date: 1968     Quit date: 3/6/2013     Years since quittin.7     Smokeless tobacco: Never   Substance Use Topics     Alcohol use: Not Currently     FAMILY HISTORY  Family History   Problem Relation Age of Onset     Hypertension Mother      Cerebrovascular Disease Mother      Glaucoma Father      Cancer Father      Diabetes Sister      Glaucoma Sister      Cancer - colorectal Other      Cerebrovascular Disease Other      Skin Cancer No family hx of      Melanoma No family hx of      Anesthesia Reaction No family hx of      Deep Vein Thrombosis (DVT) No family hx of      Arthritis Brother      Diabetes Sister      Glaucoma Sister        Objective     Physical Exam   BP (!) 156/62   Pulse 69   Temp 97.2  F (36.2  C)   Resp 18   Ht 1.6 m (5' 3\")   Wt 69.1 kg (152 lb 4.8 oz)   SpO2 99%   BMI 26.98 kg/m    Gen: sitting up in bed, alert and in no acute distress  Card: RRR, S1, S2, no murmurs  Resp: lungs clear to auscultation bilaterally, no crackles or wheezes anteriorly or laterally   Ext: no LE edema  Neuro: CX II-XII grossly in tact  Psych: alert and oriented to self and general situation; flat affect    LABS:  Recent Labs   Lab Test 22  0540   HGB 10.7*         POTASSIUM 3.6   CR 0.96*   A1C  --      Diagnostics:  No labs were ordered during this visit.   No EKG required for low risk surgery (cataract, skin procedure, breast biopsy, etc).    Revised Cardiac Risk Index (RCRI):  The patient has the following serious cardiovascular risks for perioperative complications:   - Coronary Artery Disease (MI, positive stress test, angina, Qs on EKG) = 1 point   - Cerebrovascular Disease (TIA or CVA) = 1 point   - Diabetes Mellitus (on Insulin) = 1 point     RCRI Interpretation: 2 points: Class III (moderate risk - 6.6% complication rate) - this is MAC, not general and a low risk procedure       Signed Electronically by: Tami Orourke MD  Copy of " this evaluation report is provided to requesting physician.

## 2022-12-26 NOTE — PROGRESS NOTES
[facility] Institutional Assessment     Institutional Assessment for Health Risk Assessment with Cristal Preciado completed on December 26, 2022 at [facility]    Type of residence:: Nursing home  Current living arrangement:: I live in a nursing home     Assessment completed with:: Patient, VM-chart review, Care Team Member      Mental/Behavioral Health   Depression Screening:   PHQ-2 Total Score (Adult) - Positive if 3 or more points; Administer PHQ-9 if positive: 1       Mental health DX:: No         Falls Assessment:   Fallen 2 or more times in the past year?: No   Any fall with injury in the past year?: No    ADL/IADL Dependencies:   Dependent ADLs:: Wheelchair-with assist, Bathing, Dressing, Eating, Grooming, Incontinence, Positioning, Transfers, Toileting  Dependent IADLs:: Cleaning, Cooking, Laundry, Shopping, Meal Preparation, Medication Management, Money Management, Transportation, Incontinence      Care Plan & Recommendations: Met with Cristal at [facility], introduced self and role. Discussed options/opportunities for transitions: Cristal just moved to [facility] from [facility] to be closer to her daughter who lives here; they are roommates. Cristal was sitting in her wheelchair in the lounge watching TV.  She was well dressed and well kept. Her nails where painted and her hair was combed.  She states she is glad she is here with her daughter. She also reports she has a total of 5 children with a set of twins.  They all live in the area and come to see her when possible.  States she does like to go to activities and spend time with her daughter. She is asking when the rest of her belongings will be coming over from the other facility.  States she has some jewelry and would like them before they get lost. Advised I will speak with the  and ask when they will be bringing them over.     Emailed both facilities and the social workers are working on a plan to  get her belongings over to her.     See Institutional Care Plan for detailed assessment information.    Obtained a copy of the facility care plan and MDS from facility electronic records. Requested of half-way social worker to put this care coordinator on care conference attendee list.    Placed the Health Plan facility face sheet in the member's facility chart.    Follow-Up Plan: Member informed of future contact, plan to f/u with member with a 6 month assessment, attend 1 care conference annually, and will follow any hospitalizations or transitions. Care Coordinator contact information shared with member/family and facility, and encouraged to call this care coordinator with any questions or concerns at any time.     [facility] care continuum providers: Please see Snapshot and Care Management Flowsheets for Specific details of care plan.    This CC note routed to PCP.    Lois Loera RN  Care Coordinator-Long Term Care  [address] [address]  [email or web address]   [email or web address]     Office: [phone]   Fax: [phone]

## 2023-01-01 ENCOUNTER — DOCUMENTATION ONLY (OUTPATIENT)
Dept: VASCULAR SURGERY | Facility: CLINIC | Age: 73
End: 2023-01-01
Payer: COMMERCIAL

## 2023-01-01 ENCOUNTER — LAB REQUISITION (OUTPATIENT)
Dept: LAB | Facility: CLINIC | Age: 73
End: 2023-01-01
Payer: COMMERCIAL

## 2023-01-01 ENCOUNTER — APPOINTMENT (OUTPATIENT)
Dept: ULTRASOUND IMAGING | Facility: CLINIC | Age: 73
DRG: 592 | End: 2023-01-01
Attending: EMERGENCY MEDICINE
Payer: COMMERCIAL

## 2023-01-01 ENCOUNTER — TRANSFERRED RECORDS (OUTPATIENT)
Dept: HEALTH INFORMATION MANAGEMENT | Facility: CLINIC | Age: 73
End: 2023-01-01

## 2023-01-01 ENCOUNTER — TELEPHONE (OUTPATIENT)
Dept: GERIATRICS | Facility: CLINIC | Age: 73
End: 2023-01-01
Payer: COMMERCIAL

## 2023-01-01 ENCOUNTER — ANESTHESIA (OUTPATIENT)
Dept: SURGERY | Facility: HOSPITAL | Age: 73
DRG: 570 | End: 2023-01-01
Payer: COMMERCIAL

## 2023-01-01 ENCOUNTER — PATIENT OUTREACH (OUTPATIENT)
Dept: GERIATRIC MEDICINE | Facility: CLINIC | Age: 73
End: 2023-01-01

## 2023-01-01 ENCOUNTER — APPOINTMENT (OUTPATIENT)
Dept: MRI IMAGING | Facility: HOSPITAL | Age: 73
DRG: 071 | End: 2023-01-01
Attending: INTERNAL MEDICINE
Payer: COMMERCIAL

## 2023-01-01 ENCOUNTER — PATIENT OUTREACH (OUTPATIENT)
Dept: GERIATRIC MEDICINE | Facility: CLINIC | Age: 73
End: 2023-01-01
Payer: COMMERCIAL

## 2023-01-01 ENCOUNTER — TRANSITIONAL CARE UNIT VISIT (OUTPATIENT)
Dept: GERIATRICS | Facility: CLINIC | Age: 73
End: 2023-01-01
Payer: COMMERCIAL

## 2023-01-01 ENCOUNTER — DOCUMENTATION ONLY (OUTPATIENT)
Dept: OTHER | Facility: CLINIC | Age: 73
End: 2023-01-01
Payer: COMMERCIAL

## 2023-01-01 ENCOUNTER — APPOINTMENT (OUTPATIENT)
Dept: SPEECH THERAPY | Facility: CLINIC | Age: 73
DRG: 592 | End: 2023-01-01
Attending: INTERNAL MEDICINE
Payer: COMMERCIAL

## 2023-01-01 ENCOUNTER — NURSING HOME VISIT (OUTPATIENT)
Dept: GERIATRICS | Facility: CLINIC | Age: 73
End: 2023-01-01
Payer: COMMERCIAL

## 2023-01-01 ENCOUNTER — APPOINTMENT (OUTPATIENT)
Dept: CT IMAGING | Facility: HOSPITAL | Age: 73
DRG: 071 | End: 2023-01-01
Attending: EMERGENCY MEDICINE
Payer: COMMERCIAL

## 2023-01-01 ENCOUNTER — ANESTHESIA (OUTPATIENT)
Dept: ORTHOPEDICS | Facility: CLINIC | Age: 73
DRG: 592 | End: 2023-01-01
Payer: COMMERCIAL

## 2023-01-01 ENCOUNTER — HOSPITAL ENCOUNTER (OUTPATIENT)
Facility: HOSPITAL | Age: 73
Setting detail: OBSERVATION
Discharge: NURSING, REHAB OR LONG-TERM CARE | End: 2023-02-03
Attending: EMERGENCY MEDICINE | Admitting: INTERNAL MEDICINE
Payer: COMMERCIAL

## 2023-01-01 ENCOUNTER — APPOINTMENT (OUTPATIENT)
Dept: GENERAL RADIOLOGY | Facility: CLINIC | Age: 73
DRG: 592 | End: 2023-01-01
Attending: INTERNAL MEDICINE
Payer: COMMERCIAL

## 2023-01-01 ENCOUNTER — ANESTHESIA EVENT (OUTPATIENT)
Dept: SURGERY | Facility: HOSPITAL | Age: 73
DRG: 570 | End: 2023-01-01
Payer: COMMERCIAL

## 2023-01-01 ENCOUNTER — TELEPHONE (OUTPATIENT)
Dept: GERIATRICS | Facility: CLINIC | Age: 73
End: 2023-01-01

## 2023-01-01 ENCOUNTER — APPOINTMENT (OUTPATIENT)
Dept: SPEECH THERAPY | Facility: HOSPITAL | Age: 73
DRG: 570 | End: 2023-01-01
Attending: INTERNAL MEDICINE
Payer: COMMERCIAL

## 2023-01-01 ENCOUNTER — DOCUMENTATION ONLY (OUTPATIENT)
Dept: GERIATRICS | Facility: CLINIC | Age: 73
End: 2023-01-01

## 2023-01-01 ENCOUNTER — APPOINTMENT (OUTPATIENT)
Dept: SPEECH THERAPY | Facility: HOSPITAL | Age: 73
DRG: 071 | End: 2023-01-01
Attending: INTERNAL MEDICINE
Payer: COMMERCIAL

## 2023-01-01 ENCOUNTER — MEDICAL CORRESPONDENCE (OUTPATIENT)
Dept: HEALTH INFORMATION MANAGEMENT | Facility: CLINIC | Age: 73
End: 2023-01-01

## 2023-01-01 ENCOUNTER — APPOINTMENT (OUTPATIENT)
Dept: NEUROLOGY | Facility: HOSPITAL | Age: 73
DRG: 071 | End: 2023-01-01
Attending: PSYCHIATRY & NEUROLOGY
Payer: COMMERCIAL

## 2023-01-01 ENCOUNTER — HOSPITAL ENCOUNTER (INPATIENT)
Facility: HOSPITAL | Age: 73
LOS: 2 days | Discharge: NURSING, REHAB OR LONG-TERM CARE | DRG: 071 | End: 2023-01-07
Attending: EMERGENCY MEDICINE | Admitting: INTERNAL MEDICINE
Payer: COMMERCIAL

## 2023-01-01 ENCOUNTER — LAB REQUISITION (OUTPATIENT)
Dept: LAB | Facility: CLINIC | Age: 73
DRG: 592 | End: 2023-01-01
Payer: COMMERCIAL

## 2023-01-01 ENCOUNTER — APPOINTMENT (OUTPATIENT)
Dept: RADIOLOGY | Facility: HOSPITAL | Age: 73
DRG: 570 | End: 2023-01-01
Attending: HOSPITALIST
Payer: COMMERCIAL

## 2023-01-01 ENCOUNTER — APPOINTMENT (OUTPATIENT)
Dept: CT IMAGING | Facility: CLINIC | Age: 73
DRG: 592 | End: 2023-01-01
Attending: INTERNAL MEDICINE
Payer: COMMERCIAL

## 2023-01-01 ENCOUNTER — APPOINTMENT (OUTPATIENT)
Dept: PHYSICAL THERAPY | Facility: HOSPITAL | Age: 73
DRG: 071 | End: 2023-01-01
Attending: INTERNAL MEDICINE
Payer: COMMERCIAL

## 2023-01-01 ENCOUNTER — HOSPITAL ENCOUNTER (INPATIENT)
Facility: CLINIC | Age: 73
LOS: 5 days | DRG: 592 | End: 2023-05-13
Attending: EMERGENCY MEDICINE | Admitting: INTERNAL MEDICINE
Payer: COMMERCIAL

## 2023-01-01 ENCOUNTER — APPOINTMENT (OUTPATIENT)
Dept: CT IMAGING | Facility: HOSPITAL | Age: 73
DRG: 570 | End: 2023-01-01
Attending: EMERGENCY MEDICINE
Payer: COMMERCIAL

## 2023-01-01 ENCOUNTER — LAB REQUISITION (OUTPATIENT)
Dept: LAB | Facility: CLINIC | Age: 73
End: 2023-01-01

## 2023-01-01 ENCOUNTER — ANESTHESIA EVENT (OUTPATIENT)
Dept: ORTHOPEDICS | Facility: CLINIC | Age: 73
DRG: 592 | End: 2023-01-01
Payer: COMMERCIAL

## 2023-01-01 ENCOUNTER — HOSPITAL ENCOUNTER (INPATIENT)
Facility: HOSPITAL | Age: 73
LOS: 33 days | Discharge: NURSING, REHAB OR LONG-TERM CARE | DRG: 570 | End: 2023-03-21
Attending: EMERGENCY MEDICINE | Admitting: FAMILY MEDICINE
Payer: COMMERCIAL

## 2023-01-01 ENCOUNTER — APPOINTMENT (OUTPATIENT)
Dept: SPEECH THERAPY | Facility: HOSPITAL | Age: 73
DRG: 570 | End: 2023-01-01
Payer: COMMERCIAL

## 2023-01-01 ENCOUNTER — APPOINTMENT (OUTPATIENT)
Dept: SPEECH THERAPY | Facility: HOSPITAL | Age: 73
DRG: 071 | End: 2023-01-01
Payer: COMMERCIAL

## 2023-01-01 VITALS
RESPIRATION RATE: 18 BRPM | HEART RATE: 92 BPM | WEIGHT: 148.3 LBS | SYSTOLIC BLOOD PRESSURE: 111 MMHG | OXYGEN SATURATION: 92 % | HEIGHT: 67 IN | TEMPERATURE: 98.1 F | DIASTOLIC BLOOD PRESSURE: 70 MMHG | BODY MASS INDEX: 23.28 KG/M2

## 2023-01-01 VITALS
OXYGEN SATURATION: 99 % | HEIGHT: 67 IN | HEART RATE: 83 BPM | RESPIRATION RATE: 18 BRPM | WEIGHT: 148.3 LBS | SYSTOLIC BLOOD PRESSURE: 122 MMHG | TEMPERATURE: 97.3 F | BODY MASS INDEX: 23.28 KG/M2 | DIASTOLIC BLOOD PRESSURE: 76 MMHG

## 2023-01-01 VITALS
RESPIRATION RATE: 18 BRPM | DIASTOLIC BLOOD PRESSURE: 77 MMHG | OXYGEN SATURATION: 98 % | HEART RATE: 60 BPM | SYSTOLIC BLOOD PRESSURE: 177 MMHG | TEMPERATURE: 97.4 F

## 2023-01-01 VITALS
HEART RATE: 66 BPM | OXYGEN SATURATION: 97 % | BODY MASS INDEX: 23.28 KG/M2 | TEMPERATURE: 98.2 F | SYSTOLIC BLOOD PRESSURE: 115 MMHG | HEIGHT: 67 IN | RESPIRATION RATE: 18 BRPM | WEIGHT: 148.3 LBS | DIASTOLIC BLOOD PRESSURE: 74 MMHG

## 2023-01-01 VITALS
RESPIRATION RATE: 20 BRPM | DIASTOLIC BLOOD PRESSURE: 54 MMHG | TEMPERATURE: 97.9 F | OXYGEN SATURATION: 99 % | HEART RATE: 55 BPM | SYSTOLIC BLOOD PRESSURE: 113 MMHG | HEIGHT: 67 IN | BODY MASS INDEX: 23.85 KG/M2

## 2023-01-01 VITALS
TEMPERATURE: 97.7 F | HEIGHT: 67 IN | BODY MASS INDEX: 23.28 KG/M2 | HEART RATE: 76 BPM | RESPIRATION RATE: 17 BRPM | SYSTOLIC BLOOD PRESSURE: 134 MMHG | DIASTOLIC BLOOD PRESSURE: 85 MMHG | OXYGEN SATURATION: 96 % | WEIGHT: 148.3 LBS

## 2023-01-01 VITALS
WEIGHT: 145.2 LBS | BODY MASS INDEX: 25.72 KG/M2 | HEART RATE: 77 BPM | DIASTOLIC BLOOD PRESSURE: 63 MMHG | SYSTOLIC BLOOD PRESSURE: 110 MMHG | TEMPERATURE: 97.5 F

## 2023-01-01 VITALS
BODY MASS INDEX: 19.67 KG/M2 | SYSTOLIC BLOOD PRESSURE: 109 MMHG | HEART RATE: 88 BPM | OXYGEN SATURATION: 100 % | HEIGHT: 67 IN | DIASTOLIC BLOOD PRESSURE: 62 MMHG | RESPIRATION RATE: 18 BRPM | TEMPERATURE: 97.2 F | WEIGHT: 125.3 LBS

## 2023-01-01 VITALS
RESPIRATION RATE: 16 BRPM | TEMPERATURE: 97.3 F | OXYGEN SATURATION: 100 % | HEIGHT: 67 IN | DIASTOLIC BLOOD PRESSURE: 52 MMHG | SYSTOLIC BLOOD PRESSURE: 94 MMHG | HEART RATE: 74 BPM | BODY MASS INDEX: 19.67 KG/M2 | WEIGHT: 125.3 LBS

## 2023-01-01 VITALS
OXYGEN SATURATION: 94 % | BODY MASS INDEX: 20.4 KG/M2 | HEIGHT: 67 IN | HEART RATE: 83 BPM | RESPIRATION RATE: 18 BRPM | WEIGHT: 130 LBS | TEMPERATURE: 97.9 F | DIASTOLIC BLOOD PRESSURE: 57 MMHG | SYSTOLIC BLOOD PRESSURE: 94 MMHG

## 2023-01-01 VITALS
WEIGHT: 130 LBS | OXYGEN SATURATION: 99 % | DIASTOLIC BLOOD PRESSURE: 72 MMHG | BODY MASS INDEX: 20.4 KG/M2 | RESPIRATION RATE: 16 BRPM | TEMPERATURE: 97.2 F | HEIGHT: 67 IN | SYSTOLIC BLOOD PRESSURE: 107 MMHG | HEART RATE: 92 BPM

## 2023-01-01 VITALS
TEMPERATURE: 97.3 F | DIASTOLIC BLOOD PRESSURE: 62 MMHG | HEART RATE: 95 BPM | WEIGHT: 130 LBS | RESPIRATION RATE: 16 BRPM | SYSTOLIC BLOOD PRESSURE: 123 MMHG | BODY MASS INDEX: 20.4 KG/M2 | OXYGEN SATURATION: 98 % | HEIGHT: 67 IN

## 2023-01-01 VITALS
HEART RATE: 72 BPM | DIASTOLIC BLOOD PRESSURE: 76 MMHG | WEIGHT: 137 LBS | SYSTOLIC BLOOD PRESSURE: 126 MMHG | BODY MASS INDEX: 21.5 KG/M2 | RESPIRATION RATE: 18 BRPM | OXYGEN SATURATION: 93 % | HEIGHT: 67 IN | TEMPERATURE: 97 F

## 2023-01-01 VITALS
SYSTOLIC BLOOD PRESSURE: 94 MMHG | OXYGEN SATURATION: 69 % | WEIGHT: 142.42 LBS | DIASTOLIC BLOOD PRESSURE: 45 MMHG | HEIGHT: 67 IN | TEMPERATURE: 96.3 F | RESPIRATION RATE: 23 BRPM | BODY MASS INDEX: 22.35 KG/M2

## 2023-01-01 VITALS
HEART RATE: 72 BPM | SYSTOLIC BLOOD PRESSURE: 139 MMHG | OXYGEN SATURATION: 98 % | RESPIRATION RATE: 20 BRPM | WEIGHT: 129.63 LBS | TEMPERATURE: 98.1 F | BODY MASS INDEX: 20.3 KG/M2 | DIASTOLIC BLOOD PRESSURE: 65 MMHG

## 2023-01-01 VITALS
WEIGHT: 125.3 LBS | OXYGEN SATURATION: 100 % | TEMPERATURE: 97.5 F | DIASTOLIC BLOOD PRESSURE: 62 MMHG | BODY MASS INDEX: 19.67 KG/M2 | SYSTOLIC BLOOD PRESSURE: 115 MMHG | RESPIRATION RATE: 16 BRPM | HEART RATE: 77 BPM | HEIGHT: 67 IN

## 2023-01-01 VITALS
HEART RATE: 85 BPM | WEIGHT: 130 LBS | HEIGHT: 67 IN | OXYGEN SATURATION: 100 % | BODY MASS INDEX: 20.4 KG/M2 | DIASTOLIC BLOOD PRESSURE: 63 MMHG | TEMPERATURE: 97 F | RESPIRATION RATE: 18 BRPM | SYSTOLIC BLOOD PRESSURE: 106 MMHG

## 2023-01-01 DIAGNOSIS — L08.9 INFECTED DECUBITUS ULCER, STAGE IV (H): Primary | ICD-10-CM

## 2023-01-01 DIAGNOSIS — E78.5 DYSLIPIDEMIA: ICD-10-CM

## 2023-01-01 DIAGNOSIS — R41.89 COGNITIVE IMPAIRMENT: ICD-10-CM

## 2023-01-01 DIAGNOSIS — L89.94 PRESSURE INJURY, STAGE 4, WITH INFECTION (H): ICD-10-CM

## 2023-01-01 DIAGNOSIS — N18.2 STAGE 2 CHRONIC KIDNEY DISEASE: ICD-10-CM

## 2023-01-01 DIAGNOSIS — R13.10 DYSPHAGIA, UNSPECIFIED TYPE: ICD-10-CM

## 2023-01-01 DIAGNOSIS — Z86.73 HISTORY OF CVA (CEREBROVASCULAR ACCIDENT): ICD-10-CM

## 2023-01-01 DIAGNOSIS — R62.7 ADULT FAILURE TO THRIVE: ICD-10-CM

## 2023-01-01 DIAGNOSIS — L89.629 PRESSURE INJURY OF SKIN OF BOTH HEELS: ICD-10-CM

## 2023-01-01 DIAGNOSIS — E83.42 HYPOMAGNESEMIA: ICD-10-CM

## 2023-01-01 DIAGNOSIS — E44.0 MODERATE MALNUTRITION (H): ICD-10-CM

## 2023-01-01 DIAGNOSIS — E11.65 TYPE 2 DIABETES MELLITUS WITH HYPERGLYCEMIA (H): ICD-10-CM

## 2023-01-01 DIAGNOSIS — I10 ESSENTIAL HYPERTENSION: ICD-10-CM

## 2023-01-01 DIAGNOSIS — E11.42 DIABETIC PERIPHERAL NEUROPATHY ASSOCIATED WITH TYPE 2 DIABETES MELLITUS (H): ICD-10-CM

## 2023-01-01 DIAGNOSIS — L89.619 PRESSURE INJURY OF SKIN OF BOTH HEELS: ICD-10-CM

## 2023-01-01 DIAGNOSIS — D64.9 ANEMIA, UNSPECIFIED: ICD-10-CM

## 2023-01-01 DIAGNOSIS — D64.9 CHRONIC ANEMIA: ICD-10-CM

## 2023-01-01 DIAGNOSIS — R52 PAIN: ICD-10-CM

## 2023-01-01 DIAGNOSIS — I25.10 CORONARY ARTERY DISEASE INVOLVING NATIVE CORONARY ARTERY OF NATIVE HEART WITHOUT ANGINA PECTORIS: ICD-10-CM

## 2023-01-01 DIAGNOSIS — Z29.9 ENCOUNTER FOR PROPHYLACTIC MEASURES, UNSPECIFIED: ICD-10-CM

## 2023-01-01 DIAGNOSIS — E11.40 TYPE 2 DIABETES MELLITUS WITH DIABETIC NEUROPATHY, WITH LONG-TERM CURRENT USE OF INSULIN (H): ICD-10-CM

## 2023-01-01 DIAGNOSIS — L89.224 PRESSURE INJURY OF LEFT HIP, STAGE 4 (H): ICD-10-CM

## 2023-01-01 DIAGNOSIS — Z86.19 HISTORY OF HEPATITIS B: ICD-10-CM

## 2023-01-01 DIAGNOSIS — G47.00 INSOMNIA, UNSPECIFIED TYPE: ICD-10-CM

## 2023-01-01 DIAGNOSIS — R79.89 ABNORMAL LFTS: ICD-10-CM

## 2023-01-01 DIAGNOSIS — U07.1 COVID-19: ICD-10-CM

## 2023-01-01 DIAGNOSIS — K59.01 SLOW TRANSIT CONSTIPATION: ICD-10-CM

## 2023-01-01 DIAGNOSIS — K21.9 GASTROESOPHAGEAL REFLUX DISEASE, UNSPECIFIED WHETHER ESOPHAGITIS PRESENT: ICD-10-CM

## 2023-01-01 DIAGNOSIS — E46 UNSPECIFIED PROTEIN-CALORIE MALNUTRITION (H): ICD-10-CM

## 2023-01-01 DIAGNOSIS — L89.94 INFECTED DECUBITUS ULCER, STAGE IV (H): Primary | ICD-10-CM

## 2023-01-01 DIAGNOSIS — R79.89 ELEVATED LFTS: ICD-10-CM

## 2023-01-01 DIAGNOSIS — F32.A DEPRESSION, UNSPECIFIED DEPRESSION TYPE: ICD-10-CM

## 2023-01-01 DIAGNOSIS — K21.00 GASTROESOPHAGEAL REFLUX DISEASE WITH ESOPHAGITIS WITHOUT HEMORRHAGE: ICD-10-CM

## 2023-01-01 DIAGNOSIS — Z79.4 TYPE 2 DIABETES MELLITUS WITH DIABETIC NEUROPATHY, WITH LONG-TERM CURRENT USE OF INSULIN (H): ICD-10-CM

## 2023-01-01 DIAGNOSIS — D64.9 ANEMIA: ICD-10-CM

## 2023-01-01 DIAGNOSIS — Z79.4 TYPE 2 DIABETES MELLITUS WITH HYPERGLYCEMIA, WITH LONG-TERM CURRENT USE OF INSULIN (H): Primary | ICD-10-CM

## 2023-01-01 DIAGNOSIS — E87.1 HYPONATREMIA: ICD-10-CM

## 2023-01-01 DIAGNOSIS — R63.0 ANOREXIA: ICD-10-CM

## 2023-01-01 DIAGNOSIS — N18.2 CKD (CHRONIC KIDNEY DISEASE) STAGE 2, GFR 60-89 ML/MIN: ICD-10-CM

## 2023-01-01 DIAGNOSIS — R53.81 PHYSICAL DECONDITIONING: ICD-10-CM

## 2023-01-01 DIAGNOSIS — L89.150 PRESSURE INJURY OF SACRAL REGION, UNSTAGEABLE (H): Primary | ICD-10-CM

## 2023-01-01 DIAGNOSIS — E86.0 DEHYDRATION: ICD-10-CM

## 2023-01-01 DIAGNOSIS — N18.9 CHRONIC KIDNEY DISEASE, UNSPECIFIED: ICD-10-CM

## 2023-01-01 DIAGNOSIS — N17.9 ACUTE KIDNEY INJURY (H): ICD-10-CM

## 2023-01-01 DIAGNOSIS — G89.4 CHRONIC PAIN SYNDROME: Primary | ICD-10-CM

## 2023-01-01 DIAGNOSIS — H26.9 CATARACT OF BOTH EYES, UNSPECIFIED CATARACT TYPE: ICD-10-CM

## 2023-01-01 DIAGNOSIS — L08.9 WOUND INFECTION: ICD-10-CM

## 2023-01-01 DIAGNOSIS — F50.00 ANOREXIA NERVOSA, UNSPECIFIED (H): ICD-10-CM

## 2023-01-01 DIAGNOSIS — N17.9 ACUTE KIDNEY FAILURE, UNSPECIFIED (H): ICD-10-CM

## 2023-01-01 DIAGNOSIS — E11.9 WELL CONTROLLED TYPE 2 DIABETES MELLITUS (H): ICD-10-CM

## 2023-01-01 DIAGNOSIS — I12.9 BENIGN HYPERTENSIVE KIDNEY DISEASE WITH CHRONIC KIDNEY DISEASE STAGE I THROUGH STAGE IV, OR UNSPECIFIED: Primary | ICD-10-CM

## 2023-01-01 DIAGNOSIS — G47.09 OTHER INSOMNIA: ICD-10-CM

## 2023-01-01 DIAGNOSIS — N18.30 STAGE 3 CHRONIC KIDNEY DISEASE, UNSPECIFIED WHETHER STAGE 3A OR 3B CKD (H): ICD-10-CM

## 2023-01-01 DIAGNOSIS — L89.154 PRESSURE INJURY OF SACRAL REGION, STAGE 4 (H): Primary | ICD-10-CM

## 2023-01-01 DIAGNOSIS — G93.40 ENCEPHALOPATHY: ICD-10-CM

## 2023-01-01 DIAGNOSIS — S31.000A SACRAL WOUND, INITIAL ENCOUNTER: ICD-10-CM

## 2023-01-01 DIAGNOSIS — D62 ACUTE BLOOD LOSS ANEMIA: ICD-10-CM

## 2023-01-01 DIAGNOSIS — G63 POLYNEUROPATHY ASSOCIATED WITH UNDERLYING DISEASE (H): Primary | ICD-10-CM

## 2023-01-01 DIAGNOSIS — F32.89 OTHER DEPRESSION: ICD-10-CM

## 2023-01-01 DIAGNOSIS — R65.20 SEVERE SEPSIS (H): ICD-10-CM

## 2023-01-01 DIAGNOSIS — Z11.1 ENCOUNTER FOR SCREENING FOR RESPIRATORY TUBERCULOSIS: ICD-10-CM

## 2023-01-01 DIAGNOSIS — E11.69 TYPE 2 DIABETES MELLITUS WITH OTHER SPECIFIED COMPLICATION, WITH LONG-TERM CURRENT USE OF INSULIN (H): ICD-10-CM

## 2023-01-01 DIAGNOSIS — R14.2 FLATULENCE, ERUCTATION, AND GAS PAIN: ICD-10-CM

## 2023-01-01 DIAGNOSIS — E87.1 HYPO-OSMOLALITY AND HYPONATREMIA: ICD-10-CM

## 2023-01-01 DIAGNOSIS — L89.40: ICD-10-CM

## 2023-01-01 DIAGNOSIS — I63.9 CEREBRAL INFARCTION, UNSPECIFIED (H): ICD-10-CM

## 2023-01-01 DIAGNOSIS — R74.01 ELEVATION OF LEVELS OF LIVER TRANSAMINASE LEVELS: ICD-10-CM

## 2023-01-01 DIAGNOSIS — L29.9 ITCHING: ICD-10-CM

## 2023-01-01 DIAGNOSIS — I12.9 BENIGN HYPERTENSIVE KIDNEY DISEASE WITH CHRONIC KIDNEY DISEASE STAGE I THROUGH STAGE IV, OR UNSPECIFIED: ICD-10-CM

## 2023-01-01 DIAGNOSIS — R53.83 OTHER FATIGUE: ICD-10-CM

## 2023-01-01 DIAGNOSIS — R07.9 ACUTE CHEST PAIN: ICD-10-CM

## 2023-01-01 DIAGNOSIS — L08.9 PRESSURE INJURY, STAGE 4, WITH INFECTION (H): ICD-10-CM

## 2023-01-01 DIAGNOSIS — E11.29 TYPE 2 DIABETES MELLITUS WITH OTHER DIABETIC KIDNEY COMPLICATION (H): ICD-10-CM

## 2023-01-01 DIAGNOSIS — D64.9 ANEMIA, UNSPECIFIED TYPE: ICD-10-CM

## 2023-01-01 DIAGNOSIS — R63.4 WEIGHT LOSS: ICD-10-CM

## 2023-01-01 DIAGNOSIS — N18.9 CHRONIC KIDNEY DISEASE, UNSPECIFIED CKD STAGE: ICD-10-CM

## 2023-01-01 DIAGNOSIS — H40.003 GLAUCOMA SUSPECT, BILATERAL: ICD-10-CM

## 2023-01-01 DIAGNOSIS — E11.65 TYPE 2 DIABETES MELLITUS WITH HYPERGLYCEMIA, WITH LONG-TERM CURRENT USE OF INSULIN (H): Primary | ICD-10-CM

## 2023-01-01 DIAGNOSIS — E11.42 TYPE 2 DIABETES MELLITUS WITH DIABETIC POLYNEUROPATHY, WITH LONG-TERM CURRENT USE OF INSULIN (H): ICD-10-CM

## 2023-01-01 DIAGNOSIS — E78.5 HYPERLIPIDEMIA, UNSPECIFIED HYPERLIPIDEMIA TYPE: ICD-10-CM

## 2023-01-01 DIAGNOSIS — Z79.4 TYPE 2 DIABETES MELLITUS WITH DIABETIC POLYNEUROPATHY, WITH LONG-TERM CURRENT USE OF INSULIN (H): ICD-10-CM

## 2023-01-01 DIAGNOSIS — R41.0 CONFUSION: ICD-10-CM

## 2023-01-01 DIAGNOSIS — R14.3 FLATULENCE, ERUCTATION, AND GAS PAIN: ICD-10-CM

## 2023-01-01 DIAGNOSIS — R12 HEART BURN: Primary | ICD-10-CM

## 2023-01-01 DIAGNOSIS — Z79.4 TYPE 2 DIABETES MELLITUS WITH OTHER SPECIFIED COMPLICATION, WITH LONG-TERM CURRENT USE OF INSULIN (H): ICD-10-CM

## 2023-01-01 DIAGNOSIS — G63 POLYNEUROPATHY ASSOCIATED WITH UNDERLYING DISEASE (H): ICD-10-CM

## 2023-01-01 DIAGNOSIS — T14.8XXA WOUND INFECTION: ICD-10-CM

## 2023-01-01 DIAGNOSIS — A41.9 SEVERE SEPSIS (H): ICD-10-CM

## 2023-01-01 DIAGNOSIS — F03.B0 MODERATE DEMENTIA WITHOUT BEHAVIORAL DISTURBANCE, PSYCHOTIC DISTURBANCE, MOOD DISTURBANCE, OR ANXIETY, UNSPECIFIED DEMENTIA TYPE (H): ICD-10-CM

## 2023-01-01 DIAGNOSIS — G89.4 CHRONIC PAIN SYNDROME: ICD-10-CM

## 2023-01-01 DIAGNOSIS — R14.1 FLATULENCE, ERUCTATION, AND GAS PAIN: ICD-10-CM

## 2023-01-01 DIAGNOSIS — L89.156 PRESSURE INJURY OF DEEP TISSUE OF SACRAL REGION: ICD-10-CM

## 2023-01-01 DIAGNOSIS — N18.4 CHRONIC KIDNEY DISEASE, STAGE 4 (SEVERE) (H): ICD-10-CM

## 2023-01-01 LAB
ABO/RH(D): NORMAL
ALBUMIN MFR UR ELPH: 10 MG/DL (ref 1–14)
ALBUMIN SERPL BCG-MCNC: 0.9 G/DL (ref 3.5–5.2)
ALBUMIN SERPL BCG-MCNC: 1.3 G/DL (ref 3.5–5.2)
ALBUMIN SERPL BCG-MCNC: 1.4 G/DL (ref 3.5–5.2)
ALBUMIN SERPL BCG-MCNC: 1.5 G/DL (ref 3.5–5.2)
ALBUMIN SERPL BCG-MCNC: 1.5 G/DL (ref 3.5–5.2)
ALBUMIN SERPL BCG-MCNC: 1.7 G/DL (ref 3.5–5.2)
ALBUMIN SERPL BCG-MCNC: 1.8 G/DL (ref 3.5–5.2)
ALBUMIN SERPL BCG-MCNC: 1.8 G/DL (ref 3.5–5.2)
ALBUMIN SERPL BCG-MCNC: 2 G/DL (ref 3.5–5.2)
ALBUMIN SERPL BCG-MCNC: 2 G/DL (ref 3.5–5.2)
ALBUMIN SERPL BCG-MCNC: 2.1 G/DL (ref 3.5–5.2)
ALBUMIN SERPL BCG-MCNC: 2.2 G/DL (ref 3.5–5.2)
ALBUMIN SERPL BCG-MCNC: 2.3 G/DL (ref 3.5–5.2)
ALBUMIN SERPL BCG-MCNC: 2.9 G/DL (ref 3.5–5.2)
ALBUMIN SERPL BCG-MCNC: 3.3 G/DL (ref 3.5–5.2)
ALBUMIN SERPL BCG-MCNC: 3.4 G/DL (ref 3.5–5.2)
ALBUMIN SERPL BCG-MCNC: 3.6 G/DL (ref 3.5–5.2)
ALBUMIN UR-MCNC: 20 MG/DL
ALBUMIN UR-MCNC: 20 MG/DL
ALLEN'S TEST: ABNORMAL
ALP SERPL-CCNC: 147 U/L (ref 35–104)
ALP SERPL-CCNC: 154 U/L (ref 35–104)
ALP SERPL-CCNC: 160 U/L (ref 35–104)
ALP SERPL-CCNC: 173 U/L (ref 35–104)
ALP SERPL-CCNC: 184 U/L (ref 35–104)
ALP SERPL-CCNC: 186 U/L (ref 35–104)
ALP SERPL-CCNC: 194 U/L (ref 35–104)
ALP SERPL-CCNC: 223 U/L (ref 35–104)
ALP SERPL-CCNC: 248 U/L (ref 35–104)
ALP SERPL-CCNC: 306 U/L (ref 35–104)
ALP SERPL-CCNC: 318 U/L (ref 35–104)
ALP SERPL-CCNC: 443 U/L (ref 35–104)
ALP SERPL-CCNC: 600 U/L (ref 35–104)
ALP SERPL-CCNC: 652 U/L (ref 35–104)
ALP SERPL-CCNC: 677 U/L (ref 35–104)
ALP SERPL-CCNC: 764 U/L (ref 35–104)
ALP SERPL-CCNC: 773 U/L (ref 35–104)
ALT SERPL W P-5'-P-CCNC: 12 U/L (ref 10–35)
ALT SERPL W P-5'-P-CCNC: 123 U/L (ref 10–35)
ALT SERPL W P-5'-P-CCNC: 14 U/L (ref 10–35)
ALT SERPL W P-5'-P-CCNC: 14 U/L (ref 10–35)
ALT SERPL W P-5'-P-CCNC: 160 U/L (ref 10–35)
ALT SERPL W P-5'-P-CCNC: 17 U/L (ref 10–35)
ALT SERPL W P-5'-P-CCNC: 22 U/L (ref 10–35)
ALT SERPL W P-5'-P-CCNC: 31 U/L (ref 10–35)
ALT SERPL W P-5'-P-CCNC: 32 U/L (ref 10–35)
ALT SERPL W P-5'-P-CCNC: 36 U/L (ref 10–35)
ALT SERPL W P-5'-P-CCNC: 55 U/L (ref 10–35)
ALT SERPL W P-5'-P-CCNC: 55 U/L (ref 10–35)
ALT SERPL W P-5'-P-CCNC: 57 U/L (ref 10–35)
ALT SERPL W P-5'-P-CCNC: 60 U/L (ref 10–35)
ALT SERPL W P-5'-P-CCNC: 77 U/L (ref 10–35)
ALT SERPL W P-5'-P-CCNC: 78 U/L (ref 10–35)
ALT SERPL W P-5'-P-CCNC: 93 U/L (ref 10–35)
ALT SERPL W P-5'-P-CCNC: 94 U/L (ref 10–35)
ANA PAT SER IF-IMP: ABNORMAL
ANA PAT SER IF-IMP: ABNORMAL
ANA SER QL IF: POSITIVE
ANA SER QL IF: POSITIVE
ANA TITR SER IF: ABNORMAL {TITER}
ANA TITR SER IF: ABNORMAL {TITER}
ANION GAP SERPL CALCULATED.3IONS-SCNC: 10 MMOL/L (ref 7–15)
ANION GAP SERPL CALCULATED.3IONS-SCNC: 11 MMOL/L (ref 7–15)
ANION GAP SERPL CALCULATED.3IONS-SCNC: 12 MMOL/L (ref 7–15)
ANION GAP SERPL CALCULATED.3IONS-SCNC: 13 MMOL/L (ref 7–15)
ANION GAP SERPL CALCULATED.3IONS-SCNC: 14 MMOL/L (ref 7–15)
ANION GAP SERPL CALCULATED.3IONS-SCNC: 14 MMOL/L (ref 7–15)
ANION GAP SERPL CALCULATED.3IONS-SCNC: 15 MMOL/L (ref 7–15)
ANION GAP SERPL CALCULATED.3IONS-SCNC: 16 MMOL/L (ref 7–15)
ANION GAP SERPL CALCULATED.3IONS-SCNC: 16 MMOL/L (ref 7–15)
ANION GAP SERPL CALCULATED.3IONS-SCNC: 17 MMOL/L (ref 7–15)
ANION GAP SERPL CALCULATED.3IONS-SCNC: 18 MMOL/L (ref 7–15)
ANION GAP SERPL CALCULATED.3IONS-SCNC: 19 MMOL/L (ref 7–15)
ANION GAP SERPL CALCULATED.3IONS-SCNC: 21 MMOL/L (ref 7–15)
ANION GAP SERPL CALCULATED.3IONS-SCNC: 28 MMOL/L (ref 7–15)
ANION GAP SERPL CALCULATED.3IONS-SCNC: 4 MMOL/L (ref 7–15)
ANION GAP SERPL CALCULATED.3IONS-SCNC: 5 MMOL/L (ref 7–15)
ANION GAP SERPL CALCULATED.3IONS-SCNC: 5 MMOL/L (ref 7–15)
ANION GAP SERPL CALCULATED.3IONS-SCNC: 6 MMOL/L (ref 7–15)
ANION GAP SERPL CALCULATED.3IONS-SCNC: 6 MMOL/L (ref 7–15)
ANION GAP SERPL CALCULATED.3IONS-SCNC: 7 MMOL/L (ref 7–15)
ANION GAP SERPL CALCULATED.3IONS-SCNC: 8 MMOL/L (ref 7–15)
ANION GAP SERPL CALCULATED.3IONS-SCNC: 9 MMOL/L (ref 7–15)
ANTIBODY SCREEN: NEGATIVE
APPEARANCE UR: ABNORMAL
APPEARANCE UR: CLEAR
APTT PPP: 41 SECONDS (ref 22–38)
AST SERPL W P-5'-P-CCNC: 117 U/L (ref 10–35)
AST SERPL W P-5'-P-CCNC: 164 U/L (ref 10–35)
AST SERPL W P-5'-P-CCNC: 182 U/L (ref 10–35)
AST SERPL W P-5'-P-CCNC: 202 U/L (ref 10–35)
AST SERPL W P-5'-P-CCNC: 26 U/L (ref 10–35)
AST SERPL W P-5'-P-CCNC: 265 U/L (ref 10–35)
AST SERPL W P-5'-P-CCNC: 28 U/L (ref 10–35)
AST SERPL W P-5'-P-CCNC: 31 U/L (ref 10–35)
AST SERPL W P-5'-P-CCNC: 35 U/L (ref 10–35)
AST SERPL W P-5'-P-CCNC: 40 U/L (ref 10–35)
AST SERPL W P-5'-P-CCNC: 507 U/L (ref 10–35)
AST SERPL W P-5'-P-CCNC: 54 U/L (ref 10–35)
AST SERPL W P-5'-P-CCNC: 56 U/L (ref 10–35)
AST SERPL W P-5'-P-CCNC: 60 U/L (ref 10–35)
AST SERPL W P-5'-P-CCNC: 69 U/L (ref 10–35)
AST SERPL W P-5'-P-CCNC: 76 U/L (ref 10–35)
AST SERPL W P-5'-P-CCNC: 84 U/L (ref 10–35)
BACTERIA #/AREA URNS HPF: ABNORMAL /HPF
BACTERIA #/AREA URNS HPF: ABNORMAL /HPF
BACTERIA ABSC ANAEROBE+AEROBE CULT: ABNORMAL
BACTERIA BLD CULT: NO GROWTH
BACTERIA UR CULT: NORMAL
BACTERIA UR CULT: NORMAL
BACTERIA WND CULT: ABNORMAL
BASE EXCESS BLDA CALC-SCNC: -17.6 MMOL/L (ref -9–1.8)
BASE EXCESS BLDA CALC-SCNC: -17.7 MMOL/L (ref -9–1.8)
BASE EXCESS BLDA CALC-SCNC: -19.6 MMOL/L (ref -9–1.8)
BASE EXCESS BLDA CALC-SCNC: -19.6 MMOL/L (ref -9–1.8)
BASE EXCESS BLDA CALC-SCNC: -20.1 MMOL/L (ref -9–1.8)
BASE EXCESS BLDA CALC-SCNC: -22.1 MMOL/L (ref -9–1.8)
BASE EXCESS BLDA CALC-SCNC: -22.9 MMOL/L (ref -9–1.8)
BASE EXCESS BLDV CALC-SCNC: -15 MMOL/L (ref -7.7–1.9)
BASOPHILS # BLD AUTO: 0 10E3/UL (ref 0–0.2)
BASOPHILS # BLD AUTO: 0.1 10E3/UL (ref 0–0.2)
BASOPHILS NFR BLD AUTO: 0 %
BASOPHILS NFR BLD AUTO: 1 %
BILIRUB DIRECT SERPL-MCNC: 0.2 MG/DL (ref 0–0.3)
BILIRUB DIRECT SERPL-MCNC: 0.21 MG/DL (ref 0–0.3)
BILIRUB DIRECT SERPL-MCNC: <0.2 MG/DL (ref 0–0.3)
BILIRUB SERPL-MCNC: 0.2 MG/DL
BILIRUB SERPL-MCNC: 0.2 MG/DL
BILIRUB SERPL-MCNC: 0.3 MG/DL
BILIRUB SERPL-MCNC: 0.4 MG/DL
BILIRUB SERPL-MCNC: 0.5 MG/DL
BILIRUB SERPL-MCNC: 0.6 MG/DL
BILIRUB SERPL-MCNC: 0.6 MG/DL
BILIRUB SERPL-MCNC: 0.8 MG/DL
BILIRUB UR QL STRIP: NEGATIVE
BILIRUB UR QL STRIP: NEGATIVE
BLD PROD TYP BPU: NORMAL
BLOOD COMPONENT TYPE: NORMAL
BUN SERPL-MCNC: 10 MG/DL (ref 8–23)
BUN SERPL-MCNC: 13.3 MG/DL (ref 8–23)
BUN SERPL-MCNC: 13.4 MG/DL (ref 8–23)
BUN SERPL-MCNC: 17.2 MG/DL (ref 8–23)
BUN SERPL-MCNC: 17.5 MG/DL (ref 8–23)
BUN SERPL-MCNC: 17.9 MG/DL (ref 8–23)
BUN SERPL-MCNC: 18.1 MG/DL (ref 8–23)
BUN SERPL-MCNC: 19.5 MG/DL (ref 8–23)
BUN SERPL-MCNC: 25.6 MG/DL (ref 8–23)
BUN SERPL-MCNC: 25.9 MG/DL (ref 8–23)
BUN SERPL-MCNC: 29.3 MG/DL (ref 8–23)
BUN SERPL-MCNC: 3.1 MG/DL (ref 8–23)
BUN SERPL-MCNC: 32.5 MG/DL (ref 8–23)
BUN SERPL-MCNC: 32.6 MG/DL (ref 8–23)
BUN SERPL-MCNC: 32.9 MG/DL (ref 8–23)
BUN SERPL-MCNC: 34.3 MG/DL (ref 8–23)
BUN SERPL-MCNC: 34.6 MG/DL (ref 8–23)
BUN SERPL-MCNC: 34.8 MG/DL (ref 8–23)
BUN SERPL-MCNC: 35 MG/DL (ref 8–23)
BUN SERPL-MCNC: 37.6 MG/DL (ref 8–23)
BUN SERPL-MCNC: 38.2 MG/DL (ref 8–23)
BUN SERPL-MCNC: 38.7 MG/DL (ref 8–23)
BUN SERPL-MCNC: 38.7 MG/DL (ref 8–23)
BUN SERPL-MCNC: 4 MG/DL (ref 8–23)
BUN SERPL-MCNC: 42.6 MG/DL (ref 8–23)
BUN SERPL-MCNC: 44.4 MG/DL (ref 8–23)
BUN SERPL-MCNC: 45.2 MG/DL (ref 8–23)
BUN SERPL-MCNC: 5 MG/DL (ref 8–23)
BUN SERPL-MCNC: 5 MG/DL (ref 8–23)
BUN SERPL-MCNC: 5.3 MG/DL (ref 8–23)
BUN SERPL-MCNC: 50.9 MG/DL (ref 8–23)
BUN SERPL-MCNC: 7.4 MG/DL (ref 8–23)
BUN SERPL-MCNC: 7.5 MG/DL (ref 8–23)
BUN SERPL-MCNC: 8 MG/DL (ref 8–23)
CALCIUM SERPL-MCNC: 10.3 MG/DL (ref 8.8–10.2)
CALCIUM SERPL-MCNC: 10.3 MG/DL (ref 8.8–10.2)
CALCIUM SERPL-MCNC: 7.1 MG/DL (ref 8.8–10.2)
CALCIUM SERPL-MCNC: 7.3 MG/DL (ref 8.8–10.2)
CALCIUM SERPL-MCNC: 7.4 MG/DL (ref 8.8–10.2)
CALCIUM SERPL-MCNC: 7.5 MG/DL (ref 8.8–10.2)
CALCIUM SERPL-MCNC: 7.5 MG/DL (ref 8.8–10.2)
CALCIUM SERPL-MCNC: 7.6 MG/DL (ref 8.8–10.2)
CALCIUM SERPL-MCNC: 7.6 MG/DL (ref 8.8–10.2)
CALCIUM SERPL-MCNC: 7.7 MG/DL (ref 8.8–10.2)
CALCIUM SERPL-MCNC: 8 MG/DL (ref 8.8–10.2)
CALCIUM SERPL-MCNC: 8.1 MG/DL (ref 8.8–10.2)
CALCIUM SERPL-MCNC: 8.4 MG/DL (ref 8.8–10.2)
CALCIUM SERPL-MCNC: 8.5 MG/DL (ref 8.8–10.2)
CALCIUM SERPL-MCNC: 8.7 MG/DL (ref 8.8–10.2)
CALCIUM SERPL-MCNC: 8.8 MG/DL (ref 8.8–10.2)
CALCIUM SERPL-MCNC: 8.9 MG/DL (ref 8.8–10.2)
CALCIUM SERPL-MCNC: 8.9 MG/DL (ref 8.8–10.2)
CALCIUM SERPL-MCNC: 9 MG/DL (ref 8.8–10.2)
CALCIUM SERPL-MCNC: 9.2 MG/DL (ref 8.8–10.2)
CALCIUM SERPL-MCNC: 9.3 MG/DL (ref 8.8–10.2)
CALCIUM SERPL-MCNC: 9.4 MG/DL (ref 8.8–10.2)
CALCIUM SERPL-MCNC: 9.6 MG/DL (ref 8.8–10.2)
CALCIUM SERPL-MCNC: 9.9 MG/DL (ref 8.8–10.2)
CALCIUM, IONIZED MEASURED: 1.19 MMOL/L (ref 1.11–1.3)
CHLORIDE SERPL-SCNC: 102 MMOL/L (ref 98–107)
CHLORIDE SERPL-SCNC: 103 MMOL/L (ref 98–107)
CHLORIDE SERPL-SCNC: 103 MMOL/L (ref 98–107)
CHLORIDE SERPL-SCNC: 104 MMOL/L (ref 98–107)
CHLORIDE SERPL-SCNC: 105 MMOL/L (ref 98–107)
CHLORIDE SERPL-SCNC: 106 MMOL/L (ref 98–107)
CHLORIDE SERPL-SCNC: 107 MMOL/L (ref 98–107)
CHLORIDE SERPL-SCNC: 107 MMOL/L (ref 98–107)
CHLORIDE SERPL-SCNC: 108 MMOL/L (ref 98–107)
CHLORIDE SERPL-SCNC: 109 MMOL/L (ref 98–107)
CHLORIDE SERPL-SCNC: 110 MMOL/L (ref 98–107)
CHLORIDE SERPL-SCNC: 111 MMOL/L (ref 98–107)
CHLORIDE SERPL-SCNC: 113 MMOL/L (ref 98–107)
CHLORIDE SERPL-SCNC: 93 MMOL/L (ref 98–107)
CODING SYSTEM: NORMAL
COLOR UR AUTO: YELLOW
COLOR UR AUTO: YELLOW
CORTIS SERPL-MCNC: 64.3 UG/DL
CREAT SERPL-MCNC: 0.57 MG/DL (ref 0.51–0.95)
CREAT SERPL-MCNC: 0.58 MG/DL (ref 0.51–0.95)
CREAT SERPL-MCNC: 0.59 MG/DL (ref 0.51–0.95)
CREAT SERPL-MCNC: 0.59 MG/DL (ref 0.51–0.95)
CREAT SERPL-MCNC: 0.6 MG/DL (ref 0.51–0.95)
CREAT SERPL-MCNC: 0.62 MG/DL (ref 0.51–0.95)
CREAT SERPL-MCNC: 0.63 MG/DL (ref 0.51–0.95)
CREAT SERPL-MCNC: 0.63 MG/DL (ref 0.51–0.95)
CREAT SERPL-MCNC: 0.65 MG/DL (ref 0.51–0.95)
CREAT SERPL-MCNC: 0.66 MG/DL (ref 0.51–0.95)
CREAT SERPL-MCNC: 0.68 MG/DL (ref 0.51–0.95)
CREAT SERPL-MCNC: 0.7 MG/DL (ref 0.51–0.95)
CREAT SERPL-MCNC: 0.74 MG/DL (ref 0.51–0.95)
CREAT SERPL-MCNC: 0.75 MG/DL (ref 0.51–0.95)
CREAT SERPL-MCNC: 0.75 MG/DL (ref 0.51–0.95)
CREAT SERPL-MCNC: 0.76 MG/DL (ref 0.51–0.95)
CREAT SERPL-MCNC: 0.76 MG/DL (ref 0.51–0.95)
CREAT SERPL-MCNC: 0.84 MG/DL (ref 0.51–0.95)
CREAT SERPL-MCNC: 0.85 MG/DL (ref 0.51–0.95)
CREAT SERPL-MCNC: 0.86 MG/DL (ref 0.51–0.95)
CREAT SERPL-MCNC: 0.87 MG/DL (ref 0.51–0.95)
CREAT SERPL-MCNC: 0.87 MG/DL (ref 0.51–0.95)
CREAT SERPL-MCNC: 0.89 MG/DL (ref 0.51–0.95)
CREAT SERPL-MCNC: 0.93 MG/DL (ref 0.51–0.95)
CREAT SERPL-MCNC: 0.94 MG/DL (ref 0.51–0.95)
CREAT SERPL-MCNC: 0.96 MG/DL (ref 0.51–0.95)
CREAT SERPL-MCNC: 0.96 MG/DL (ref 0.51–0.95)
CREAT SERPL-MCNC: 0.97 MG/DL (ref 0.51–0.95)
CREAT SERPL-MCNC: 1.02 MG/DL (ref 0.51–0.95)
CREAT SERPL-MCNC: 1.04 MG/DL (ref 0.51–0.95)
CREAT SERPL-MCNC: 1.05 MG/DL (ref 0.51–0.95)
CREAT SERPL-MCNC: 1.05 MG/DL (ref 0.51–0.95)
CREAT SERPL-MCNC: 1.06 MG/DL (ref 0.51–0.95)
CREAT SERPL-MCNC: 1.08 MG/DL (ref 0.51–0.95)
CREAT SERPL-MCNC: 1.1 MG/DL (ref 0.51–0.95)
CREAT SERPL-MCNC: 1.12 MG/DL (ref 0.51–0.95)
CREAT SERPL-MCNC: 1.13 MG/DL (ref 0.51–0.95)
CREAT SERPL-MCNC: 1.17 MG/DL (ref 0.51–0.95)
CREAT SERPL-MCNC: 1.23 MG/DL (ref 0.51–0.95)
CREAT SERPL-MCNC: 1.25 MG/DL (ref 0.51–0.95)
CREAT SERPL-MCNC: 1.37 MG/DL (ref 0.51–0.95)
CREAT SERPL-MCNC: 1.64 MG/DL (ref 0.51–0.95)
CREAT UR-MCNC: 23 MG/DL
CROSSMATCH: NORMAL
CRP SERPL-MCNC: 216.4 MG/L
CRP SERPL-MCNC: 33.5 MG/L
CRP SERPL-MCNC: 337.7 MG/L
CRP SERPL-MCNC: 41.6 MG/L
CRP SERPL-MCNC: 60.4 MG/L
CRP SERPL-MCNC: 78.5 MG/L
CRP SERPL-MCNC: 9.2 MG/L
DEPRECATED HCO3 PLAS-SCNC: 12 MMOL/L (ref 22–29)
DEPRECATED HCO3 PLAS-SCNC: 13 MMOL/L (ref 22–29)
DEPRECATED HCO3 PLAS-SCNC: 13 MMOL/L (ref 22–29)
DEPRECATED HCO3 PLAS-SCNC: 14 MMOL/L (ref 22–29)
DEPRECATED HCO3 PLAS-SCNC: 14 MMOL/L (ref 22–29)
DEPRECATED HCO3 PLAS-SCNC: 15 MMOL/L (ref 22–29)
DEPRECATED HCO3 PLAS-SCNC: 15 MMOL/L (ref 22–29)
DEPRECATED HCO3 PLAS-SCNC: 16 MMOL/L (ref 22–29)
DEPRECATED HCO3 PLAS-SCNC: 17 MMOL/L (ref 22–29)
DEPRECATED HCO3 PLAS-SCNC: 18 MMOL/L (ref 22–29)
DEPRECATED HCO3 PLAS-SCNC: 19 MMOL/L (ref 22–29)
DEPRECATED HCO3 PLAS-SCNC: 20 MMOL/L (ref 22–29)
DEPRECATED HCO3 PLAS-SCNC: 21 MMOL/L (ref 22–29)
DEPRECATED HCO3 PLAS-SCNC: 22 MMOL/L (ref 22–29)
DEPRECATED HCO3 PLAS-SCNC: 22 MMOL/L (ref 22–29)
DEPRECATED HCO3 PLAS-SCNC: 23 MMOL/L (ref 22–29)
DEPRECATED HCO3 PLAS-SCNC: 24 MMOL/L (ref 22–29)
DEPRECATED HCO3 PLAS-SCNC: 25 MMOL/L (ref 22–29)
DEPRECATED HCO3 PLAS-SCNC: 26 MMOL/L (ref 22–29)
DEPRECATED HCO3 PLAS-SCNC: 7 MMOL/L (ref 22–29)
DEPRECATED HCO3 PLAS-SCNC: 9 MMOL/L (ref 22–29)
DSDNA AB SER-ACNC: 1.1 IU/ML
ENA SM IGG SER IA-ACNC: 1.6 U/ML
ENA SM IGG SER IA-ACNC: NEGATIVE
EOSINOPHIL # BLD AUTO: 0 10E3/UL (ref 0–0.7)
EOSINOPHIL # BLD AUTO: 0.1 10E3/UL (ref 0–0.7)
EOSINOPHIL # BLD AUTO: 0.2 10E3/UL (ref 0–0.7)
EOSINOPHIL NFR BLD AUTO: 0 %
EOSINOPHIL NFR BLD AUTO: 1 %
EOSINOPHIL NFR BLD AUTO: 1 %
EOSINOPHIL NFR BLD AUTO: 2 %
EOSINOPHIL NFR BLD AUTO: 3 %
EOSINOPHIL NFR BLD AUTO: 4 %
EOSINOPHIL NFR BLD AUTO: 5 %
ERYTHROCYTE [DISTWIDTH] IN BLOOD BY AUTOMATED COUNT: 12.6 % (ref 10–15)
ERYTHROCYTE [DISTWIDTH] IN BLOOD BY AUTOMATED COUNT: 12.7 % (ref 10–15)
ERYTHROCYTE [DISTWIDTH] IN BLOOD BY AUTOMATED COUNT: 12.8 % (ref 10–15)
ERYTHROCYTE [DISTWIDTH] IN BLOOD BY AUTOMATED COUNT: 13.3 % (ref 10–15)
ERYTHROCYTE [DISTWIDTH] IN BLOOD BY AUTOMATED COUNT: 13.5 % (ref 10–15)
ERYTHROCYTE [DISTWIDTH] IN BLOOD BY AUTOMATED COUNT: 13.8 % (ref 10–15)
ERYTHROCYTE [DISTWIDTH] IN BLOOD BY AUTOMATED COUNT: 14 % (ref 10–15)
ERYTHROCYTE [DISTWIDTH] IN BLOOD BY AUTOMATED COUNT: 14.1 % (ref 10–15)
ERYTHROCYTE [DISTWIDTH] IN BLOOD BY AUTOMATED COUNT: 15.9 % (ref 10–15)
ERYTHROCYTE [DISTWIDTH] IN BLOOD BY AUTOMATED COUNT: 15.9 % (ref 10–15)
ERYTHROCYTE [DISTWIDTH] IN BLOOD BY AUTOMATED COUNT: 16 % (ref 10–15)
ERYTHROCYTE [DISTWIDTH] IN BLOOD BY AUTOMATED COUNT: 16.1 % (ref 10–15)
ERYTHROCYTE [DISTWIDTH] IN BLOOD BY AUTOMATED COUNT: 16.3 % (ref 10–15)
ERYTHROCYTE [DISTWIDTH] IN BLOOD BY AUTOMATED COUNT: 16.5 % (ref 10–15)
ERYTHROCYTE [DISTWIDTH] IN BLOOD BY AUTOMATED COUNT: 16.6 % (ref 10–15)
ERYTHROCYTE [DISTWIDTH] IN BLOOD BY AUTOMATED COUNT: 17.4 % (ref 10–15)
ERYTHROCYTE [DISTWIDTH] IN BLOOD BY AUTOMATED COUNT: 21 % (ref 10–15)
ERYTHROCYTE [DISTWIDTH] IN BLOOD BY AUTOMATED COUNT: 21.1 % (ref 10–15)
ERYTHROCYTE [DISTWIDTH] IN BLOOD BY AUTOMATED COUNT: 21.2 % (ref 10–15)
ERYTHROCYTE [DISTWIDTH] IN BLOOD BY AUTOMATED COUNT: 21.4 % (ref 10–15)
ERYTHROCYTE [DISTWIDTH] IN BLOOD BY AUTOMATED COUNT: 21.6 % (ref 10–15)
ERYTHROCYTE [DISTWIDTH] IN BLOOD BY AUTOMATED COUNT: 21.6 % (ref 10–15)
ERYTHROCYTE [DISTWIDTH] IN BLOOD BY AUTOMATED COUNT: 21.7 % (ref 10–15)
ERYTHROCYTE [DISTWIDTH] IN BLOOD BY AUTOMATED COUNT: 21.9 % (ref 10–15)
ERYTHROCYTE [DISTWIDTH] IN BLOOD BY AUTOMATED COUNT: 22 % (ref 10–15)
ERYTHROCYTE [DISTWIDTH] IN BLOOD BY AUTOMATED COUNT: 22 % (ref 10–15)
ERYTHROCYTE [DISTWIDTH] IN BLOOD BY AUTOMATED COUNT: 22.5 % (ref 10–15)
ERYTHROCYTE [DISTWIDTH] IN BLOOD BY AUTOMATED COUNT: 22.8 % (ref 10–15)
ERYTHROCYTE [SEDIMENTATION RATE] IN BLOOD BY WESTERGREN METHOD: 118 MM/HR (ref 0–20)
FOLATE SERPL-MCNC: >40 NG/ML (ref 4.6–34.8)
GAMMA INTERFERON BACKGROUND BLD IA-ACNC: 0.03 IU/ML
GAMMA INTERFERON BACKGROUND BLD IA-ACNC: 0.04 IU/ML
GFR SERPL CREATININE-BSD FRML MDRD: 33 ML/MIN/1.73M2
GFR SERPL CREATININE-BSD FRML MDRD: 41 ML/MIN/1.73M2
GFR SERPL CREATININE-BSD FRML MDRD: 46 ML/MIN/1.73M2
GFR SERPL CREATININE-BSD FRML MDRD: 47 ML/MIN/1.73M2
GFR SERPL CREATININE-BSD FRML MDRD: 50 ML/MIN/1.73M2
GFR SERPL CREATININE-BSD FRML MDRD: 52 ML/MIN/1.73M2
GFR SERPL CREATININE-BSD FRML MDRD: 52 ML/MIN/1.73M2
GFR SERPL CREATININE-BSD FRML MDRD: 53 ML/MIN/1.73M2
GFR SERPL CREATININE-BSD FRML MDRD: 55 ML/MIN/1.73M2
GFR SERPL CREATININE-BSD FRML MDRD: 56 ML/MIN/1.73M2
GFR SERPL CREATININE-BSD FRML MDRD: 57 ML/MIN/1.73M2
GFR SERPL CREATININE-BSD FRML MDRD: 59 ML/MIN/1.73M2
GFR SERPL CREATININE-BSD FRML MDRD: 62 ML/MIN/1.73M2
GFR SERPL CREATININE-BSD FRML MDRD: 63 ML/MIN/1.73M2
GFR SERPL CREATININE-BSD FRML MDRD: 63 ML/MIN/1.73M2
GFR SERPL CREATININE-BSD FRML MDRD: 65 ML/MIN/1.73M2
GFR SERPL CREATININE-BSD FRML MDRD: 66 ML/MIN/1.73M2
GFR SERPL CREATININE-BSD FRML MDRD: 69 ML/MIN/1.73M2
GFR SERPL CREATININE-BSD FRML MDRD: 71 ML/MIN/1.73M2
GFR SERPL CREATININE-BSD FRML MDRD: 71 ML/MIN/1.73M2
GFR SERPL CREATININE-BSD FRML MDRD: 72 ML/MIN/1.73M2
GFR SERPL CREATININE-BSD FRML MDRD: 73 ML/MIN/1.73M2
GFR SERPL CREATININE-BSD FRML MDRD: 74 ML/MIN/1.73M2
GFR SERPL CREATININE-BSD FRML MDRD: 84 ML/MIN/1.73M2
GFR SERPL CREATININE-BSD FRML MDRD: 84 ML/MIN/1.73M2
GFR SERPL CREATININE-BSD FRML MDRD: 85 ML/MIN/1.73M2
GFR SERPL CREATININE-BSD FRML MDRD: 85 ML/MIN/1.73M2
GFR SERPL CREATININE-BSD FRML MDRD: 87 ML/MIN/1.73M2
GFR SERPL CREATININE-BSD FRML MDRD: >90 ML/MIN/1.73M2
GLUCOSE BLDC GLUCOMTR-MCNC: 100 MG/DL (ref 70–99)
GLUCOSE BLDC GLUCOMTR-MCNC: 100 MG/DL (ref 70–99)
GLUCOSE BLDC GLUCOMTR-MCNC: 101 MG/DL (ref 70–99)
GLUCOSE BLDC GLUCOMTR-MCNC: 102 MG/DL (ref 70–99)
GLUCOSE BLDC GLUCOMTR-MCNC: 102 MG/DL (ref 70–99)
GLUCOSE BLDC GLUCOMTR-MCNC: 103 MG/DL (ref 70–99)
GLUCOSE BLDC GLUCOMTR-MCNC: 104 MG/DL (ref 70–99)
GLUCOSE BLDC GLUCOMTR-MCNC: 104 MG/DL (ref 70–99)
GLUCOSE BLDC GLUCOMTR-MCNC: 105 MG/DL (ref 70–99)
GLUCOSE BLDC GLUCOMTR-MCNC: 107 MG/DL (ref 70–99)
GLUCOSE BLDC GLUCOMTR-MCNC: 108 MG/DL (ref 70–99)
GLUCOSE BLDC GLUCOMTR-MCNC: 109 MG/DL (ref 70–99)
GLUCOSE BLDC GLUCOMTR-MCNC: 109 MG/DL (ref 70–99)
GLUCOSE BLDC GLUCOMTR-MCNC: 110 MG/DL (ref 70–99)
GLUCOSE BLDC GLUCOMTR-MCNC: 110 MG/DL (ref 70–99)
GLUCOSE BLDC GLUCOMTR-MCNC: 111 MG/DL (ref 70–99)
GLUCOSE BLDC GLUCOMTR-MCNC: 112 MG/DL (ref 70–99)
GLUCOSE BLDC GLUCOMTR-MCNC: 113 MG/DL (ref 70–99)
GLUCOSE BLDC GLUCOMTR-MCNC: 113 MG/DL (ref 70–99)
GLUCOSE BLDC GLUCOMTR-MCNC: 114 MG/DL (ref 70–99)
GLUCOSE BLDC GLUCOMTR-MCNC: 115 MG/DL (ref 70–99)
GLUCOSE BLDC GLUCOMTR-MCNC: 115 MG/DL (ref 70–99)
GLUCOSE BLDC GLUCOMTR-MCNC: 116 MG/DL (ref 70–99)
GLUCOSE BLDC GLUCOMTR-MCNC: 117 MG/DL (ref 70–99)
GLUCOSE BLDC GLUCOMTR-MCNC: 117 MG/DL (ref 70–99)
GLUCOSE BLDC GLUCOMTR-MCNC: 120 MG/DL (ref 70–99)
GLUCOSE BLDC GLUCOMTR-MCNC: 121 MG/DL (ref 70–99)
GLUCOSE BLDC GLUCOMTR-MCNC: 123 MG/DL (ref 70–99)
GLUCOSE BLDC GLUCOMTR-MCNC: 124 MG/DL (ref 70–99)
GLUCOSE BLDC GLUCOMTR-MCNC: 125 MG/DL (ref 70–99)
GLUCOSE BLDC GLUCOMTR-MCNC: 125 MG/DL (ref 70–99)
GLUCOSE BLDC GLUCOMTR-MCNC: 126 MG/DL (ref 70–99)
GLUCOSE BLDC GLUCOMTR-MCNC: 130 MG/DL (ref 70–99)
GLUCOSE BLDC GLUCOMTR-MCNC: 133 MG/DL (ref 70–99)
GLUCOSE BLDC GLUCOMTR-MCNC: 133 MG/DL (ref 70–99)
GLUCOSE BLDC GLUCOMTR-MCNC: 134 MG/DL (ref 70–99)
GLUCOSE BLDC GLUCOMTR-MCNC: 134 MG/DL (ref 70–99)
GLUCOSE BLDC GLUCOMTR-MCNC: 135 MG/DL (ref 70–99)
GLUCOSE BLDC GLUCOMTR-MCNC: 136 MG/DL (ref 70–99)
GLUCOSE BLDC GLUCOMTR-MCNC: 137 MG/DL (ref 70–99)
GLUCOSE BLDC GLUCOMTR-MCNC: 137 MG/DL (ref 70–99)
GLUCOSE BLDC GLUCOMTR-MCNC: 139 MG/DL (ref 70–99)
GLUCOSE BLDC GLUCOMTR-MCNC: 139 MG/DL (ref 70–99)
GLUCOSE BLDC GLUCOMTR-MCNC: 140 MG/DL (ref 70–99)
GLUCOSE BLDC GLUCOMTR-MCNC: 141 MG/DL (ref 70–99)
GLUCOSE BLDC GLUCOMTR-MCNC: 142 MG/DL (ref 70–99)
GLUCOSE BLDC GLUCOMTR-MCNC: 143 MG/DL (ref 70–99)
GLUCOSE BLDC GLUCOMTR-MCNC: 143 MG/DL (ref 70–99)
GLUCOSE BLDC GLUCOMTR-MCNC: 144 MG/DL (ref 70–99)
GLUCOSE BLDC GLUCOMTR-MCNC: 144 MG/DL (ref 70–99)
GLUCOSE BLDC GLUCOMTR-MCNC: 145 MG/DL (ref 70–99)
GLUCOSE BLDC GLUCOMTR-MCNC: 145 MG/DL (ref 70–99)
GLUCOSE BLDC GLUCOMTR-MCNC: 147 MG/DL (ref 70–99)
GLUCOSE BLDC GLUCOMTR-MCNC: 148 MG/DL (ref 70–99)
GLUCOSE BLDC GLUCOMTR-MCNC: 149 MG/DL (ref 70–99)
GLUCOSE BLDC GLUCOMTR-MCNC: 149 MG/DL (ref 70–99)
GLUCOSE BLDC GLUCOMTR-MCNC: 151 MG/DL (ref 70–99)
GLUCOSE BLDC GLUCOMTR-MCNC: 151 MG/DL (ref 70–99)
GLUCOSE BLDC GLUCOMTR-MCNC: 152 MG/DL (ref 70–99)
GLUCOSE BLDC GLUCOMTR-MCNC: 153 MG/DL (ref 70–99)
GLUCOSE BLDC GLUCOMTR-MCNC: 153 MG/DL (ref 70–99)
GLUCOSE BLDC GLUCOMTR-MCNC: 155 MG/DL (ref 70–99)
GLUCOSE BLDC GLUCOMTR-MCNC: 156 MG/DL (ref 70–99)
GLUCOSE BLDC GLUCOMTR-MCNC: 156 MG/DL (ref 70–99)
GLUCOSE BLDC GLUCOMTR-MCNC: 157 MG/DL (ref 70–99)
GLUCOSE BLDC GLUCOMTR-MCNC: 157 MG/DL (ref 70–99)
GLUCOSE BLDC GLUCOMTR-MCNC: 158 MG/DL (ref 70–99)
GLUCOSE BLDC GLUCOMTR-MCNC: 159 MG/DL (ref 70–99)
GLUCOSE BLDC GLUCOMTR-MCNC: 160 MG/DL (ref 70–99)
GLUCOSE BLDC GLUCOMTR-MCNC: 160 MG/DL (ref 70–99)
GLUCOSE BLDC GLUCOMTR-MCNC: 161 MG/DL (ref 70–99)
GLUCOSE BLDC GLUCOMTR-MCNC: 162 MG/DL (ref 70–99)
GLUCOSE BLDC GLUCOMTR-MCNC: 165 MG/DL (ref 70–99)
GLUCOSE BLDC GLUCOMTR-MCNC: 166 MG/DL (ref 70–99)
GLUCOSE BLDC GLUCOMTR-MCNC: 167 MG/DL (ref 70–99)
GLUCOSE BLDC GLUCOMTR-MCNC: 167 MG/DL (ref 70–99)
GLUCOSE BLDC GLUCOMTR-MCNC: 168 MG/DL (ref 70–99)
GLUCOSE BLDC GLUCOMTR-MCNC: 168 MG/DL (ref 70–99)
GLUCOSE BLDC GLUCOMTR-MCNC: 169 MG/DL (ref 70–99)
GLUCOSE BLDC GLUCOMTR-MCNC: 173 MG/DL (ref 70–99)
GLUCOSE BLDC GLUCOMTR-MCNC: 174 MG/DL (ref 70–99)
GLUCOSE BLDC GLUCOMTR-MCNC: 175 MG/DL (ref 70–99)
GLUCOSE BLDC GLUCOMTR-MCNC: 175 MG/DL (ref 70–99)
GLUCOSE BLDC GLUCOMTR-MCNC: 176 MG/DL (ref 70–99)
GLUCOSE BLDC GLUCOMTR-MCNC: 176 MG/DL (ref 70–99)
GLUCOSE BLDC GLUCOMTR-MCNC: 181 MG/DL (ref 70–99)
GLUCOSE BLDC GLUCOMTR-MCNC: 181 MG/DL (ref 70–99)
GLUCOSE BLDC GLUCOMTR-MCNC: 184 MG/DL (ref 70–99)
GLUCOSE BLDC GLUCOMTR-MCNC: 186 MG/DL (ref 70–99)
GLUCOSE BLDC GLUCOMTR-MCNC: 188 MG/DL (ref 70–99)
GLUCOSE BLDC GLUCOMTR-MCNC: 188 MG/DL (ref 70–99)
GLUCOSE BLDC GLUCOMTR-MCNC: 191 MG/DL (ref 70–99)
GLUCOSE BLDC GLUCOMTR-MCNC: 192 MG/DL (ref 70–99)
GLUCOSE BLDC GLUCOMTR-MCNC: 194 MG/DL (ref 70–99)
GLUCOSE BLDC GLUCOMTR-MCNC: 195 MG/DL (ref 70–99)
GLUCOSE BLDC GLUCOMTR-MCNC: 196 MG/DL (ref 70–99)
GLUCOSE BLDC GLUCOMTR-MCNC: 198 MG/DL (ref 70–99)
GLUCOSE BLDC GLUCOMTR-MCNC: 199 MG/DL (ref 70–99)
GLUCOSE BLDC GLUCOMTR-MCNC: 199 MG/DL (ref 70–99)
GLUCOSE BLDC GLUCOMTR-MCNC: 201 MG/DL (ref 70–99)
GLUCOSE BLDC GLUCOMTR-MCNC: 202 MG/DL (ref 70–99)
GLUCOSE BLDC GLUCOMTR-MCNC: 208 MG/DL (ref 70–99)
GLUCOSE BLDC GLUCOMTR-MCNC: 211 MG/DL (ref 70–99)
GLUCOSE BLDC GLUCOMTR-MCNC: 212 MG/DL (ref 70–99)
GLUCOSE BLDC GLUCOMTR-MCNC: 213 MG/DL (ref 70–99)
GLUCOSE BLDC GLUCOMTR-MCNC: 215 MG/DL (ref 70–99)
GLUCOSE BLDC GLUCOMTR-MCNC: 216 MG/DL (ref 70–99)
GLUCOSE BLDC GLUCOMTR-MCNC: 217 MG/DL (ref 70–99)
GLUCOSE BLDC GLUCOMTR-MCNC: 219 MG/DL (ref 70–99)
GLUCOSE BLDC GLUCOMTR-MCNC: 220 MG/DL (ref 70–99)
GLUCOSE BLDC GLUCOMTR-MCNC: 225 MG/DL (ref 70–99)
GLUCOSE BLDC GLUCOMTR-MCNC: 230 MG/DL (ref 70–99)
GLUCOSE BLDC GLUCOMTR-MCNC: 232 MG/DL (ref 70–99)
GLUCOSE BLDC GLUCOMTR-MCNC: 232 MG/DL (ref 70–99)
GLUCOSE BLDC GLUCOMTR-MCNC: 238 MG/DL (ref 70–99)
GLUCOSE BLDC GLUCOMTR-MCNC: 238 MG/DL (ref 70–99)
GLUCOSE BLDC GLUCOMTR-MCNC: 239 MG/DL (ref 70–99)
GLUCOSE BLDC GLUCOMTR-MCNC: 239 MG/DL (ref 70–99)
GLUCOSE BLDC GLUCOMTR-MCNC: 242 MG/DL (ref 70–99)
GLUCOSE BLDC GLUCOMTR-MCNC: 243 MG/DL (ref 70–99)
GLUCOSE BLDC GLUCOMTR-MCNC: 244 MG/DL (ref 70–99)
GLUCOSE BLDC GLUCOMTR-MCNC: 245 MG/DL (ref 70–99)
GLUCOSE BLDC GLUCOMTR-MCNC: 255 MG/DL (ref 70–99)
GLUCOSE BLDC GLUCOMTR-MCNC: 260 MG/DL (ref 70–99)
GLUCOSE BLDC GLUCOMTR-MCNC: 264 MG/DL (ref 70–99)
GLUCOSE BLDC GLUCOMTR-MCNC: 265 MG/DL (ref 70–99)
GLUCOSE BLDC GLUCOMTR-MCNC: 266 MG/DL (ref 70–99)
GLUCOSE BLDC GLUCOMTR-MCNC: 267 MG/DL (ref 70–99)
GLUCOSE BLDC GLUCOMTR-MCNC: 269 MG/DL (ref 70–99)
GLUCOSE BLDC GLUCOMTR-MCNC: 271 MG/DL (ref 70–99)
GLUCOSE BLDC GLUCOMTR-MCNC: 273 MG/DL (ref 70–99)
GLUCOSE BLDC GLUCOMTR-MCNC: 279 MG/DL (ref 70–99)
GLUCOSE BLDC GLUCOMTR-MCNC: 280 MG/DL (ref 70–99)
GLUCOSE BLDC GLUCOMTR-MCNC: 288 MG/DL (ref 70–99)
GLUCOSE BLDC GLUCOMTR-MCNC: 291 MG/DL (ref 70–99)
GLUCOSE BLDC GLUCOMTR-MCNC: 292 MG/DL (ref 70–99)
GLUCOSE BLDC GLUCOMTR-MCNC: 296 MG/DL (ref 70–99)
GLUCOSE BLDC GLUCOMTR-MCNC: 298 MG/DL (ref 70–99)
GLUCOSE BLDC GLUCOMTR-MCNC: 313 MG/DL (ref 70–99)
GLUCOSE BLDC GLUCOMTR-MCNC: 318 MG/DL (ref 70–99)
GLUCOSE BLDC GLUCOMTR-MCNC: 320 MG/DL (ref 70–99)
GLUCOSE BLDC GLUCOMTR-MCNC: 321 MG/DL (ref 70–99)
GLUCOSE BLDC GLUCOMTR-MCNC: 324 MG/DL (ref 70–99)
GLUCOSE BLDC GLUCOMTR-MCNC: 326 MG/DL (ref 70–99)
GLUCOSE BLDC GLUCOMTR-MCNC: 332 MG/DL (ref 70–99)
GLUCOSE BLDC GLUCOMTR-MCNC: 339 MG/DL (ref 70–99)
GLUCOSE BLDC GLUCOMTR-MCNC: 339 MG/DL (ref 70–99)
GLUCOSE BLDC GLUCOMTR-MCNC: 343 MG/DL (ref 70–99)
GLUCOSE BLDC GLUCOMTR-MCNC: 343 MG/DL (ref 70–99)
GLUCOSE BLDC GLUCOMTR-MCNC: 351 MG/DL (ref 70–99)
GLUCOSE BLDC GLUCOMTR-MCNC: 351 MG/DL (ref 70–99)
GLUCOSE BLDC GLUCOMTR-MCNC: 355 MG/DL (ref 70–99)
GLUCOSE BLDC GLUCOMTR-MCNC: 358 MG/DL (ref 70–99)
GLUCOSE BLDC GLUCOMTR-MCNC: 369 MG/DL (ref 70–99)
GLUCOSE BLDC GLUCOMTR-MCNC: 369 MG/DL (ref 70–99)
GLUCOSE BLDC GLUCOMTR-MCNC: 49 MG/DL (ref 70–99)
GLUCOSE BLDC GLUCOMTR-MCNC: 51 MG/DL (ref 70–99)
GLUCOSE BLDC GLUCOMTR-MCNC: 52 MG/DL (ref 70–99)
GLUCOSE BLDC GLUCOMTR-MCNC: 57 MG/DL (ref 70–99)
GLUCOSE BLDC GLUCOMTR-MCNC: 62 MG/DL (ref 70–99)
GLUCOSE BLDC GLUCOMTR-MCNC: 63 MG/DL (ref 70–99)
GLUCOSE BLDC GLUCOMTR-MCNC: 65 MG/DL (ref 70–99)
GLUCOSE BLDC GLUCOMTR-MCNC: 66 MG/DL (ref 70–99)
GLUCOSE BLDC GLUCOMTR-MCNC: 70 MG/DL (ref 70–99)
GLUCOSE BLDC GLUCOMTR-MCNC: 71 MG/DL (ref 70–99)
GLUCOSE BLDC GLUCOMTR-MCNC: 73 MG/DL (ref 70–99)
GLUCOSE BLDC GLUCOMTR-MCNC: 76 MG/DL (ref 70–99)
GLUCOSE BLDC GLUCOMTR-MCNC: 77 MG/DL (ref 70–99)
GLUCOSE BLDC GLUCOMTR-MCNC: 78 MG/DL (ref 70–99)
GLUCOSE BLDC GLUCOMTR-MCNC: 78 MG/DL (ref 70–99)
GLUCOSE BLDC GLUCOMTR-MCNC: 79 MG/DL (ref 70–99)
GLUCOSE BLDC GLUCOMTR-MCNC: 81 MG/DL (ref 70–99)
GLUCOSE BLDC GLUCOMTR-MCNC: 84 MG/DL (ref 70–99)
GLUCOSE BLDC GLUCOMTR-MCNC: 85 MG/DL (ref 70–99)
GLUCOSE BLDC GLUCOMTR-MCNC: 89 MG/DL (ref 70–99)
GLUCOSE BLDC GLUCOMTR-MCNC: 90 MG/DL (ref 70–99)
GLUCOSE BLDC GLUCOMTR-MCNC: 91 MG/DL (ref 70–99)
GLUCOSE BLDC GLUCOMTR-MCNC: 95 MG/DL (ref 70–99)
GLUCOSE BLDC GLUCOMTR-MCNC: 96 MG/DL (ref 70–99)
GLUCOSE BLDC GLUCOMTR-MCNC: 96 MG/DL (ref 70–99)
GLUCOSE BLDC GLUCOMTR-MCNC: 97 MG/DL (ref 70–99)
GLUCOSE BLDC GLUCOMTR-MCNC: 97 MG/DL (ref 70–99)
GLUCOSE BLDC GLUCOMTR-MCNC: 98 MG/DL (ref 70–99)
GLUCOSE BLDC GLUCOMTR-MCNC: 99 MG/DL (ref 70–99)
GLUCOSE BLDC GLUCOMTR-MCNC: 99 MG/DL (ref 70–99)
GLUCOSE SERPL-MCNC: 102 MG/DL (ref 70–99)
GLUCOSE SERPL-MCNC: 103 MG/DL (ref 70–99)
GLUCOSE SERPL-MCNC: 107 MG/DL (ref 70–99)
GLUCOSE SERPL-MCNC: 115 MG/DL (ref 70–99)
GLUCOSE SERPL-MCNC: 122 MG/DL (ref 70–99)
GLUCOSE SERPL-MCNC: 122 MG/DL (ref 70–99)
GLUCOSE SERPL-MCNC: 126 MG/DL (ref 70–99)
GLUCOSE SERPL-MCNC: 131 MG/DL (ref 70–99)
GLUCOSE SERPL-MCNC: 134 MG/DL (ref 70–99)
GLUCOSE SERPL-MCNC: 135 MG/DL (ref 70–99)
GLUCOSE SERPL-MCNC: 142 MG/DL (ref 70–99)
GLUCOSE SERPL-MCNC: 146 MG/DL (ref 70–99)
GLUCOSE SERPL-MCNC: 154 MG/DL (ref 70–99)
GLUCOSE SERPL-MCNC: 159 MG/DL (ref 70–99)
GLUCOSE SERPL-MCNC: 167 MG/DL (ref 70–99)
GLUCOSE SERPL-MCNC: 169 MG/DL (ref 70–99)
GLUCOSE SERPL-MCNC: 170 MG/DL (ref 70–99)
GLUCOSE SERPL-MCNC: 172 MG/DL (ref 70–99)
GLUCOSE SERPL-MCNC: 174 MG/DL (ref 70–99)
GLUCOSE SERPL-MCNC: 177 MG/DL (ref 70–99)
GLUCOSE SERPL-MCNC: 187 MG/DL (ref 70–99)
GLUCOSE SERPL-MCNC: 201 MG/DL (ref 70–99)
GLUCOSE SERPL-MCNC: 215 MG/DL (ref 70–99)
GLUCOSE SERPL-MCNC: 218 MG/DL (ref 70–99)
GLUCOSE SERPL-MCNC: 277 MG/DL (ref 70–99)
GLUCOSE SERPL-MCNC: 284 MG/DL (ref 70–99)
GLUCOSE SERPL-MCNC: 292 MG/DL (ref 70–99)
GLUCOSE SERPL-MCNC: 314 MG/DL (ref 70–99)
GLUCOSE SERPL-MCNC: 401 MG/DL (ref 70–99)
GLUCOSE SERPL-MCNC: 440 MG/DL (ref 70–99)
GLUCOSE SERPL-MCNC: 72 MG/DL (ref 70–99)
GLUCOSE SERPL-MCNC: 87 MG/DL (ref 70–99)
GLUCOSE SERPL-MCNC: 90 MG/DL (ref 70–99)
GLUCOSE SERPL-MCNC: 91 MG/DL (ref 70–99)
GLUCOSE SERPL-MCNC: 92 MG/DL (ref 70–99)
GLUCOSE SERPL-MCNC: 93 MG/DL (ref 70–99)
GLUCOSE UR STRIP-MCNC: 500 MG/DL
GLUCOSE UR STRIP-MCNC: 70 MG/DL
GRAM STAIN RESULT: ABNORMAL
HAPTOGLOB SERPL-MCNC: 186 MG/DL (ref 32–197)
HBA1C MFR BLD: 7.6 %
HCO3 BLD-SCNC: 11 MMOL/L (ref 21–28)
HCO3 BLD-SCNC: 6 MMOL/L (ref 21–28)
HCO3 BLD-SCNC: 6 MMOL/L (ref 21–28)
HCO3 BLD-SCNC: 7 MMOL/L (ref 21–28)
HCO3 BLD-SCNC: 7 MMOL/L (ref 21–28)
HCO3 BLD-SCNC: 8 MMOL/L (ref 21–28)
HCO3 BLD-SCNC: 8 MMOL/L (ref 21–28)
HCO3 BLDV-SCNC: 14 MMOL/L (ref 21–28)
HCO3 BLDV-SCNC: 16 MMOL/L (ref 21–28)
HCT VFR BLD AUTO: 17.8 % (ref 35–47)
HCT VFR BLD AUTO: 23.2 % (ref 35–47)
HCT VFR BLD AUTO: 23.3 % (ref 35–47)
HCT VFR BLD AUTO: 24.2 % (ref 35–47)
HCT VFR BLD AUTO: 24.5 % (ref 35–47)
HCT VFR BLD AUTO: 25 % (ref 35–47)
HCT VFR BLD AUTO: 25.2 % (ref 35–47)
HCT VFR BLD AUTO: 25.4 % (ref 35–47)
HCT VFR BLD AUTO: 25.5 % (ref 35–47)
HCT VFR BLD AUTO: 25.6 % (ref 35–47)
HCT VFR BLD AUTO: 25.8 % (ref 35–47)
HCT VFR BLD AUTO: 26 % (ref 35–47)
HCT VFR BLD AUTO: 26.2 % (ref 35–47)
HCT VFR BLD AUTO: 26.3 % (ref 35–47)
HCT VFR BLD AUTO: 26.7 % (ref 35–47)
HCT VFR BLD AUTO: 26.9 % (ref 35–47)
HCT VFR BLD AUTO: 27.4 % (ref 35–47)
HCT VFR BLD AUTO: 27.4 % (ref 35–47)
HCT VFR BLD AUTO: 28 % (ref 35–47)
HCT VFR BLD AUTO: 28 % (ref 35–47)
HCT VFR BLD AUTO: 28.6 % (ref 35–47)
HCT VFR BLD AUTO: 28.7 % (ref 35–47)
HCT VFR BLD AUTO: 29.2 % (ref 35–47)
HCT VFR BLD AUTO: 30.6 % (ref 35–47)
HCT VFR BLD AUTO: 30.7 % (ref 35–47)
HCT VFR BLD AUTO: 31.1 % (ref 35–47)
HCT VFR BLD AUTO: 31.5 % (ref 35–47)
HCT VFR BLD AUTO: 31.9 % (ref 35–47)
HCT VFR BLD AUTO: 31.9 % (ref 35–47)
HCT VFR BLD AUTO: 32.5 % (ref 35–47)
HCT VFR BLD AUTO: 32.9 % (ref 35–47)
HCT VFR BLD AUTO: 33.6 % (ref 35–47)
HCT VFR BLD AUTO: 34.3 % (ref 35–47)
HCT VFR BLD AUTO: 34.9 % (ref 35–47)
HCT VFR BLD AUTO: 35.1 % (ref 35–47)
HCT VFR BLD AUTO: 35.2 % (ref 35–47)
HCT VFR BLD AUTO: 37.1 % (ref 35–47)
HCT VFR BLD AUTO: 38.1 % (ref 35–47)
HCT VFR BLD AUTO: 39.2 % (ref 35–47)
HCT VFR BLD AUTO: 39.6 % (ref 35–47)
HGB BLD-MCNC: 10 G/DL (ref 11.7–15.7)
HGB BLD-MCNC: 10.1 G/DL (ref 11.7–15.7)
HGB BLD-MCNC: 10.1 G/DL (ref 11.7–15.7)
HGB BLD-MCNC: 10.2 G/DL (ref 11.7–15.7)
HGB BLD-MCNC: 10.4 G/DL (ref 11.7–15.7)
HGB BLD-MCNC: 10.4 G/DL (ref 11.7–15.7)
HGB BLD-MCNC: 10.5 G/DL (ref 11.7–15.7)
HGB BLD-MCNC: 10.5 G/DL (ref 11.7–15.7)
HGB BLD-MCNC: 11.6 G/DL (ref 11.7–15.7)
HGB BLD-MCNC: 11.6 G/DL (ref 11.7–15.7)
HGB BLD-MCNC: 11.8 G/DL (ref 11.7–15.7)
HGB BLD-MCNC: 12.3 G/DL (ref 11.7–15.7)
HGB BLD-MCNC: 5.7 G/DL (ref 11.7–15.7)
HGB BLD-MCNC: 6.4 G/DL (ref 11.7–15.7)
HGB BLD-MCNC: 6.9 G/DL (ref 11.7–15.7)
HGB BLD-MCNC: 7 G/DL (ref 11.7–15.7)
HGB BLD-MCNC: 7.3 G/DL (ref 11.7–15.7)
HGB BLD-MCNC: 7.4 G/DL (ref 11.7–15.7)
HGB BLD-MCNC: 7.6 G/DL (ref 11.7–15.7)
HGB BLD-MCNC: 7.7 G/DL (ref 11.7–15.7)
HGB BLD-MCNC: 7.8 G/DL (ref 11.7–15.7)
HGB BLD-MCNC: 7.8 G/DL (ref 11.7–15.7)
HGB BLD-MCNC: 7.9 G/DL (ref 11.7–15.7)
HGB BLD-MCNC: 8 G/DL (ref 11.7–15.7)
HGB BLD-MCNC: 8.1 G/DL (ref 11.7–15.7)
HGB BLD-MCNC: 8.3 G/DL (ref 11.7–15.7)
HGB BLD-MCNC: 8.4 G/DL (ref 11.7–15.7)
HGB BLD-MCNC: 8.4 G/DL (ref 11.7–15.7)
HGB BLD-MCNC: 8.5 G/DL (ref 11.7–15.7)
HGB BLD-MCNC: 8.7 G/DL (ref 11.7–15.7)
HGB BLD-MCNC: 8.8 G/DL (ref 11.7–15.7)
HGB BLD-MCNC: 9 G/DL (ref 11.7–15.7)
HGB BLD-MCNC: 9 G/DL (ref 11.7–15.7)
HGB BLD-MCNC: 9.1 G/DL (ref 11.7–15.7)
HGB BLD-MCNC: 9.3 G/DL (ref 11.7–15.7)
HGB BLD-MCNC: 9.7 G/DL (ref 11.7–15.7)
HGB BLD-MCNC: 9.7 G/DL (ref 11.7–15.7)
HGB BLD-MCNC: 9.9 G/DL (ref 11.7–15.7)
HGB UR QL STRIP: ABNORMAL
HGB UR QL STRIP: NEGATIVE
HOLD SPECIMEN: NORMAL
HYALINE CASTS: 68 /LPF
IMM GRANULOCYTES # BLD: 0 10E3/UL
IMM GRANULOCYTES # BLD: 0.1 10E3/UL
IMM GRANULOCYTES # BLD: 0.1 10E3/UL
IMM GRANULOCYTES NFR BLD: 0 %
IMM GRANULOCYTES NFR BLD: 1 %
IMM GRANULOCYTES NFR BLD: 1 %
INR PPP: 1.49 (ref 0.85–1.15)
ION CA PH 7.4: 1.2 MMOL/L (ref 1.11–1.3)
IRON BINDING CAPACITY (ROCHE): 167 UG/DL (ref 240–430)
IRON BINDING CAPACITY (ROCHE): 178 UG/DL (ref 240–430)
IRON SATN MFR SERPL: 21 % (ref 15–46)
IRON SATN MFR SERPL: 23 % (ref 15–46)
IRON SERPL-MCNC: 38 UG/DL (ref 37–145)
IRON SERPL-MCNC: 39 UG/DL (ref 37–145)
ISSUE DATE AND TIME: NORMAL
KETONES UR STRIP-MCNC: NEGATIVE MG/DL
KETONES UR STRIP-MCNC: NEGATIVE MG/DL
LACTATE BLD-SCNC: 1 MMOL/L
LACTATE SERPL-SCNC: 10.4 MMOL/L (ref 0.7–2)
LACTATE SERPL-SCNC: 12.2 MMOL/L (ref 0.7–2)
LACTATE SERPL-SCNC: 16 MMOL/L (ref 0.7–2)
LACTATE SERPL-SCNC: 18 MMOL/L (ref 0.7–2)
LACTATE SERPL-SCNC: 21 MMOL/L (ref 0.7–2)
LACTATE SERPL-SCNC: 3.6 MMOL/L (ref 0.7–2)
LACTATE SERPL-SCNC: 6.4 MMOL/L (ref 0.7–2)
LACTATE SERPL-SCNC: 6.7 MMOL/L (ref 0.7–2)
LACTATE SERPL-SCNC: 8.5 MMOL/L (ref 0.7–2)
LACTATE SERPL-SCNC: 8.8 MMOL/L (ref 0.7–2)
LDH SERPL L TO P-CCNC: 188 U/L (ref 0–250)
LEUKOCYTE ESTERASE UR QL STRIP: ABNORMAL
LEUKOCYTE ESTERASE UR QL STRIP: NEGATIVE
LYMPHOCYTES # BLD AUTO: 0.8 10E3/UL (ref 0.8–5.3)
LYMPHOCYTES # BLD AUTO: 1.1 10E3/UL (ref 0.8–5.3)
LYMPHOCYTES # BLD AUTO: 1.1 10E3/UL (ref 0.8–5.3)
LYMPHOCYTES # BLD AUTO: 1.2 10E3/UL (ref 0.8–5.3)
LYMPHOCYTES # BLD AUTO: 1.4 10E3/UL (ref 0.8–5.3)
LYMPHOCYTES # BLD AUTO: 1.5 10E3/UL (ref 0.8–5.3)
LYMPHOCYTES # BLD AUTO: 1.6 10E3/UL (ref 0.8–5.3)
LYMPHOCYTES # BLD AUTO: 1.6 10E3/UL (ref 0.8–5.3)
LYMPHOCYTES # BLD AUTO: 1.8 10E3/UL (ref 0.8–5.3)
LYMPHOCYTES NFR BLD AUTO: 14 %
LYMPHOCYTES NFR BLD AUTO: 23 %
LYMPHOCYTES NFR BLD AUTO: 24 %
LYMPHOCYTES NFR BLD AUTO: 26 %
LYMPHOCYTES NFR BLD AUTO: 28 %
LYMPHOCYTES NFR BLD AUTO: 30 %
LYMPHOCYTES NFR BLD AUTO: 32 %
LYMPHOCYTES NFR BLD AUTO: 8 %
LYMPHOCYTES NFR BLD AUTO: 9 %
M TB IFN-G BLD-IMP: NEGATIVE
M TB IFN-G BLD-IMP: NORMAL
M TB IFN-G CD4+ BCKGRND COR BLD-ACNC: 0.42 IU/ML
M TB IFN-G CD4+ BCKGRND COR BLD-ACNC: 2.15 IU/ML
MAGNESIUM SERPL-MCNC: 1.2 MG/DL (ref 1.7–2.3)
MAGNESIUM SERPL-MCNC: 1.2 MG/DL (ref 1.7–2.3)
MAGNESIUM SERPL-MCNC: 1.3 MG/DL (ref 1.7–2.3)
MAGNESIUM SERPL-MCNC: 1.4 MG/DL (ref 1.7–2.3)
MAGNESIUM SERPL-MCNC: 1.5 MG/DL (ref 1.7–2.3)
MAGNESIUM SERPL-MCNC: 1.6 MG/DL (ref 1.7–2.3)
MAGNESIUM SERPL-MCNC: 1.7 MG/DL (ref 1.7–2.3)
MAGNESIUM SERPL-MCNC: 1.8 MG/DL (ref 1.7–2.3)
MAGNESIUM SERPL-MCNC: 1.9 MG/DL (ref 1.7–2.3)
MAGNESIUM SERPL-MCNC: 2 MG/DL (ref 1.7–2.3)
MAGNESIUM SERPL-MCNC: 2.1 MG/DL (ref 1.7–2.3)
MAGNESIUM SERPL-MCNC: 2.2 MG/DL (ref 1.7–2.3)
MAGNESIUM SERPL-MCNC: 2.2 MG/DL (ref 1.7–2.3)
MAGNESIUM SERPL-MCNC: 2.3 MG/DL (ref 1.7–2.3)
MAGNESIUM SERPL-MCNC: 2.5 MG/DL (ref 1.7–2.3)
MAGNESIUM SERPL-MCNC: 2.6 MG/DL (ref 1.7–2.3)
MCH RBC QN AUTO: 26.5 PG (ref 26.5–33)
MCH RBC QN AUTO: 26.6 PG (ref 26.5–33)
MCH RBC QN AUTO: 26.7 PG (ref 26.5–33)
MCH RBC QN AUTO: 26.7 PG (ref 26.5–33)
MCH RBC QN AUTO: 26.8 PG (ref 26.5–33)
MCH RBC QN AUTO: 26.8 PG (ref 26.5–33)
MCH RBC QN AUTO: 26.9 PG (ref 26.5–33)
MCH RBC QN AUTO: 27.1 PG (ref 26.5–33)
MCH RBC QN AUTO: 27.2 PG (ref 26.5–33)
MCH RBC QN AUTO: 27.3 PG (ref 26.5–33)
MCH RBC QN AUTO: 27.4 PG (ref 26.5–33)
MCH RBC QN AUTO: 27.9 PG (ref 26.5–33)
MCH RBC QN AUTO: 28 PG (ref 26.5–33)
MCH RBC QN AUTO: 28.1 PG (ref 26.5–33)
MCH RBC QN AUTO: 28.3 PG (ref 26.5–33)
MCH RBC QN AUTO: 28.4 PG (ref 26.5–33)
MCH RBC QN AUTO: 28.4 PG (ref 26.5–33)
MCH RBC QN AUTO: 28.6 PG (ref 26.5–33)
MCH RBC QN AUTO: 28.7 PG (ref 26.5–33)
MCH RBC QN AUTO: 30 PG (ref 26.5–33)
MCH RBC QN AUTO: 30.2 PG (ref 26.5–33)
MCH RBC QN AUTO: 30.3 PG (ref 26.5–33)
MCH RBC QN AUTO: 30.5 PG (ref 26.5–33)
MCH RBC QN AUTO: 30.8 PG (ref 26.5–33)
MCH RBC QN AUTO: 31 PG (ref 26.5–33)
MCH RBC QN AUTO: 31 PG (ref 26.5–33)
MCH RBC QN AUTO: 31.5 PG (ref 26.5–33)
MCHC RBC AUTO-ENTMCNC: 28.9 G/DL (ref 31.5–36.5)
MCHC RBC AUTO-ENTMCNC: 28.9 G/DL (ref 31.5–36.5)
MCHC RBC AUTO-ENTMCNC: 29.4 G/DL (ref 31.5–36.5)
MCHC RBC AUTO-ENTMCNC: 29.6 G/DL (ref 31.5–36.5)
MCHC RBC AUTO-ENTMCNC: 29.7 G/DL (ref 31.5–36.5)
MCHC RBC AUTO-ENTMCNC: 29.8 G/DL (ref 31.5–36.5)
MCHC RBC AUTO-ENTMCNC: 29.9 G/DL (ref 31.5–36.5)
MCHC RBC AUTO-ENTMCNC: 30 G/DL (ref 31.5–36.5)
MCHC RBC AUTO-ENTMCNC: 30.3 G/DL (ref 31.5–36.5)
MCHC RBC AUTO-ENTMCNC: 30.5 G/DL (ref 31.5–36.5)
MCHC RBC AUTO-ENTMCNC: 30.5 G/DL (ref 31.5–36.5)
MCHC RBC AUTO-ENTMCNC: 30.7 G/DL (ref 31.5–36.5)
MCHC RBC AUTO-ENTMCNC: 31 G/DL (ref 31.5–36.5)
MCHC RBC AUTO-ENTMCNC: 31.1 G/DL (ref 31.5–36.5)
MCHC RBC AUTO-ENTMCNC: 31.2 G/DL (ref 31.5–36.5)
MCHC RBC AUTO-ENTMCNC: 31.3 G/DL (ref 31.5–36.5)
MCHC RBC AUTO-ENTMCNC: 31.4 G/DL (ref 31.5–36.5)
MCHC RBC AUTO-ENTMCNC: 31.5 G/DL (ref 31.5–36.5)
MCHC RBC AUTO-ENTMCNC: 31.6 G/DL (ref 31.5–36.5)
MCHC RBC AUTO-ENTMCNC: 31.7 G/DL (ref 31.5–36.5)
MCHC RBC AUTO-ENTMCNC: 31.7 G/DL (ref 31.5–36.5)
MCHC RBC AUTO-ENTMCNC: 31.9 G/DL (ref 31.5–36.5)
MCHC RBC AUTO-ENTMCNC: 32 G/DL (ref 31.5–36.5)
MCHC RBC AUTO-ENTMCNC: 32.7 G/DL (ref 31.5–36.5)
MCV RBC AUTO: 86 FL (ref 78–100)
MCV RBC AUTO: 87 FL (ref 78–100)
MCV RBC AUTO: 89 FL (ref 78–100)
MCV RBC AUTO: 90 FL (ref 78–100)
MCV RBC AUTO: 91 FL (ref 78–100)
MCV RBC AUTO: 92 FL (ref 78–100)
MCV RBC AUTO: 93 FL (ref 78–100)
MCV RBC AUTO: 94 FL (ref 78–100)
MCV RBC AUTO: 94 FL (ref 78–100)
MCV RBC AUTO: 95 FL (ref 78–100)
MCV RBC AUTO: 96 FL (ref 78–100)
MCV RBC AUTO: 98 FL (ref 78–100)
MCV RBC AUTO: 99 FL (ref 78–100)
MCV RBC AUTO: 99 FL (ref 78–100)
MITOGEN IGNF BCKGRD COR BLD-ACNC: -0.01 IU/ML
MITOGEN IGNF BCKGRD COR BLD-ACNC: 0 IU/ML
MITOGEN IGNF BCKGRD COR BLD-ACNC: 0 IU/ML
MITOGEN IGNF BCKGRD COR BLD-ACNC: 0.01 IU/ML
MONOCYTES # BLD AUTO: 0.8 10E3/UL (ref 0–1.3)
MONOCYTES # BLD AUTO: 0.9 10E3/UL (ref 0–1.3)
MONOCYTES # BLD AUTO: 1 10E3/UL (ref 0–1.3)
MONOCYTES # BLD AUTO: 1.1 10E3/UL (ref 0–1.3)
MONOCYTES # BLD AUTO: 1.2 10E3/UL (ref 0–1.3)
MONOCYTES # BLD AUTO: 1.3 10E3/UL (ref 0–1.3)
MONOCYTES NFR BLD AUTO: 10 %
MONOCYTES NFR BLD AUTO: 10 %
MONOCYTES NFR BLD AUTO: 12 %
MONOCYTES NFR BLD AUTO: 14 %
MONOCYTES NFR BLD AUTO: 15 %
MONOCYTES NFR BLD AUTO: 15 %
MONOCYTES NFR BLD AUTO: 16 %
MONOCYTES NFR BLD AUTO: 17 %
MONOCYTES NFR BLD AUTO: 17 %
NEUTROPHILS # BLD AUTO: 11.6 10E3/UL (ref 1.6–8.3)
NEUTROPHILS # BLD AUTO: 2.3 10E3/UL (ref 1.6–8.3)
NEUTROPHILS # BLD AUTO: 2.6 10E3/UL (ref 1.6–8.3)
NEUTROPHILS # BLD AUTO: 2.7 10E3/UL (ref 1.6–8.3)
NEUTROPHILS # BLD AUTO: 2.7 10E3/UL (ref 1.6–8.3)
NEUTROPHILS # BLD AUTO: 2.8 10E3/UL (ref 1.6–8.3)
NEUTROPHILS # BLD AUTO: 3.8 10E3/UL (ref 1.6–8.3)
NEUTROPHILS # BLD AUTO: 7.3 10E3/UL (ref 1.6–8.3)
NEUTROPHILS # BLD AUTO: 8.1 10E3/UL (ref 1.6–8.3)
NEUTROPHILS NFR BLD AUTO: 46 %
NEUTROPHILS NFR BLD AUTO: 50 %
NEUTROPHILS NFR BLD AUTO: 53 %
NEUTROPHILS NFR BLD AUTO: 55 %
NEUTROPHILS NFR BLD AUTO: 56 %
NEUTROPHILS NFR BLD AUTO: 57 %
NEUTROPHILS NFR BLD AUTO: 75 %
NEUTROPHILS NFR BLD AUTO: 77 %
NEUTROPHILS NFR BLD AUTO: 81 %
NITRATE UR QL: NEGATIVE
NITRATE UR QL: NEGATIVE
NRBC # BLD AUTO: 0 10E3/UL
NRBC BLD AUTO-RTO: 0 /100
O2/TOTAL GAS SETTING VFR VENT: 100 %
O2/TOTAL GAS SETTING VFR VENT: 100 %
O2/TOTAL GAS SETTING VFR VENT: 45 %
O2/TOTAL GAS SETTING VFR VENT: 50 %
O2/TOTAL GAS SETTING VFR VENT: 60 %
OXYHGB MFR BLD: 96 % (ref 92–100)
OXYHGB MFR BLD: 98 % (ref 92–100)
OXYHGB MFR BLDV: 36 % (ref 70–75)
PATH REPORT.COMMENTS IMP SPEC: NORMAL
PATH REPORT.COMMENTS IMP SPEC: NORMAL
PATH REPORT.FINAL DX SPEC: NORMAL
PATH REPORT.GROSS SPEC: NORMAL
PATH REPORT.MICROSCOPIC SPEC OTHER STN: NORMAL
PATH REPORT.RELEVANT HX SPEC: NORMAL
PCO2 BLD: 20 MM HG (ref 35–45)
PCO2 BLD: 20 MM HG (ref 35–45)
PCO2 BLD: 21 MM HG (ref 35–45)
PCO2 BLD: 21 MM HG (ref 35–45)
PCO2 BLD: 22 MM HG (ref 35–45)
PCO2 BLD: 27 MM HG (ref 35–45)
PCO2 BLD: 35 MM HG (ref 35–45)
PCO2 BLDV: 27 MM HG (ref 40–50)
PCO2 BLDV: 70 MM HG (ref 40–50)
PH BLD: 7.04 [PH] (ref 7.35–7.45)
PH BLD: 7.08 [PH] (ref 7.35–7.45)
PH BLD: 7.1 [PH] (ref 7.35–7.45)
PH BLD: 7.11 [PH] (ref 7.35–7.45)
PH BLD: 7.15 [PH] (ref 7.35–7.45)
PH BLD: 7.17 [PH] (ref 7.35–7.45)
PH BLD: 7.22 [PH] (ref 7.35–7.45)
PH BLDV: 6.98 [PH] (ref 7.32–7.43)
PH BLDV: 7.34 [PH] (ref 7.32–7.43)
PH UR STRIP: 5 [PH] (ref 5–7)
PH UR STRIP: 5 [PH] (ref 5–7)
PH: 7.42 (ref 7.35–7.45)
PHOSPHATE SERPL-MCNC: 1.1 MG/DL (ref 2.5–4.5)
PHOSPHATE SERPL-MCNC: 1.4 MG/DL (ref 2.5–4.5)
PHOSPHATE SERPL-MCNC: 1.6 MG/DL (ref 2.5–4.5)
PHOSPHATE SERPL-MCNC: 1.6 MG/DL (ref 2.5–4.5)
PHOSPHATE SERPL-MCNC: 2.2 MG/DL (ref 2.5–4.5)
PHOSPHATE SERPL-MCNC: 2.2 MG/DL (ref 2.5–4.5)
PHOSPHATE SERPL-MCNC: 2.3 MG/DL (ref 2.5–4.5)
PHOSPHATE SERPL-MCNC: 2.3 MG/DL (ref 2.5–4.5)
PHOSPHATE SERPL-MCNC: 2.5 MG/DL (ref 2.5–4.5)
PHOSPHATE SERPL-MCNC: 2.6 MG/DL (ref 2.5–4.5)
PHOSPHATE SERPL-MCNC: 2.7 MG/DL (ref 2.5–4.5)
PHOSPHATE SERPL-MCNC: 2.8 MG/DL (ref 2.5–4.5)
PHOSPHATE SERPL-MCNC: 2.9 MG/DL (ref 2.5–4.5)
PHOSPHATE SERPL-MCNC: 3 MG/DL (ref 2.5–4.5)
PHOSPHATE SERPL-MCNC: 3.3 MG/DL (ref 2.5–4.5)
PHOSPHATE SERPL-MCNC: 3.3 MG/DL (ref 2.5–4.5)
PHOSPHATE SERPL-MCNC: 3.7 MG/DL (ref 2.5–4.5)
PHOSPHATE SERPL-MCNC: 3.9 MG/DL (ref 2.5–4.5)
PHOSPHATE SERPL-MCNC: 5.3 MG/DL (ref 2.5–4.5)
PHOTO IMAGE: NORMAL
PLATELET # BLD AUTO: 159 10E3/UL (ref 150–450)
PLATELET # BLD AUTO: 208 10E3/UL (ref 150–450)
PLATELET # BLD AUTO: 209 10E3/UL (ref 150–450)
PLATELET # BLD AUTO: 211 10E3/UL (ref 150–450)
PLATELET # BLD AUTO: 214 10E3/UL (ref 150–450)
PLATELET # BLD AUTO: 224 10E3/UL (ref 150–450)
PLATELET # BLD AUTO: 230 10E3/UL (ref 150–450)
PLATELET # BLD AUTO: 230 10E3/UL (ref 150–450)
PLATELET # BLD AUTO: 231 10E3/UL (ref 150–450)
PLATELET # BLD AUTO: 236 10E3/UL (ref 150–450)
PLATELET # BLD AUTO: 242 10E3/UL (ref 150–450)
PLATELET # BLD AUTO: 242 10E3/UL (ref 150–450)
PLATELET # BLD AUTO: 248 10E3/UL (ref 150–450)
PLATELET # BLD AUTO: 250 10E3/UL (ref 150–450)
PLATELET # BLD AUTO: 260 10E3/UL (ref 150–450)
PLATELET # BLD AUTO: 267 10E3/UL (ref 150–450)
PLATELET # BLD AUTO: 274 10E3/UL (ref 150–450)
PLATELET # BLD AUTO: 317 10E3/UL (ref 150–450)
PLATELET # BLD AUTO: 345 10E3/UL (ref 150–450)
PLATELET # BLD AUTO: 347 10E3/UL (ref 150–450)
PLATELET # BLD AUTO: 348 10E3/UL (ref 150–450)
PLATELET # BLD AUTO: 369 10E3/UL (ref 150–450)
PLATELET # BLD AUTO: 370 10E3/UL (ref 150–450)
PLATELET # BLD AUTO: 388 10E3/UL (ref 150–450)
PLATELET # BLD AUTO: 395 10E3/UL (ref 150–450)
PLATELET # BLD AUTO: 417 10E3/UL (ref 150–450)
PLATELET # BLD AUTO: 419 10E3/UL (ref 150–450)
PLATELET # BLD AUTO: 429 10E3/UL (ref 150–450)
PLATELET # BLD AUTO: 473 10E3/UL (ref 150–450)
PLATELET # BLD AUTO: 490 10E3/UL (ref 150–450)
PLATELET # BLD AUTO: 498 10E3/UL (ref 150–450)
PLATELET # BLD AUTO: 498 10E3/UL (ref 150–450)
PLATELET # BLD AUTO: 555 10E3/UL (ref 150–450)
PLATELET # BLD AUTO: NORMAL 10*3/UL
PO2 BLD: 107 MM HG (ref 80–105)
PO2 BLD: 129 MM HG (ref 80–105)
PO2 BLD: 129 MM HG (ref 80–105)
PO2 BLD: 149 MM HG (ref 80–105)
PO2 BLD: 154 MM HG (ref 80–105)
PO2 BLD: 249 MM HG (ref 80–105)
PO2 BLD: 99 MM HG (ref 80–105)
PO2 BLDV: 35 MM HG (ref 25–47)
PO2 BLDV: 36 MM HG (ref 25–47)
POTASSIUM SERPL-SCNC: 2.8 MMOL/L (ref 3.4–5.3)
POTASSIUM SERPL-SCNC: 3 MMOL/L (ref 3.4–5.3)
POTASSIUM SERPL-SCNC: 3.4 MMOL/L (ref 3.4–5.3)
POTASSIUM SERPL-SCNC: 3.4 MMOL/L (ref 3.4–5.3)
POTASSIUM SERPL-SCNC: 3.6 MMOL/L (ref 3.4–5.3)
POTASSIUM SERPL-SCNC: 3.7 MMOL/L (ref 3.4–5.3)
POTASSIUM SERPL-SCNC: 3.8 MMOL/L (ref 3.4–5.3)
POTASSIUM SERPL-SCNC: 3.9 MMOL/L (ref 3.4–5.3)
POTASSIUM SERPL-SCNC: 4 MMOL/L (ref 3.4–5.3)
POTASSIUM SERPL-SCNC: 4.1 MMOL/L (ref 3.4–5.3)
POTASSIUM SERPL-SCNC: 4.2 MMOL/L (ref 3.4–5.3)
POTASSIUM SERPL-SCNC: 4.3 MMOL/L (ref 3.4–5.3)
POTASSIUM SERPL-SCNC: 4.4 MMOL/L (ref 3.4–5.3)
POTASSIUM SERPL-SCNC: 4.5 MMOL/L (ref 3.4–5.3)
POTASSIUM SERPL-SCNC: 4.6 MMOL/L (ref 3.4–5.3)
POTASSIUM SERPL-SCNC: 4.7 MMOL/L (ref 3.4–5.3)
POTASSIUM SERPL-SCNC: 4.7 MMOL/L (ref 3.4–5.3)
POTASSIUM SERPL-SCNC: 4.8 MMOL/L (ref 3.4–5.3)
POTASSIUM SERPL-SCNC: 5.1 MMOL/L (ref 3.4–5.3)
POTASSIUM SERPL-SCNC: 5.4 MMOL/L (ref 3.4–5.3)
POTASSIUM SERPL-SCNC: 5.8 MMOL/L (ref 3.4–5.3)
PREALB SERPL IA-MCNC: 16 MG/DL (ref 15–45)
PROCALCITONIN SERPL IA-MCNC: 0.1 NG/ML
PROCALCITONIN SERPL IA-MCNC: 0.42 NG/ML
PROT SERPL-MCNC: 4.4 G/DL (ref 6.4–8.3)
PROT SERPL-MCNC: 4.5 G/DL (ref 6.4–8.3)
PROT SERPL-MCNC: 4.5 G/DL (ref 6.4–8.3)
PROT SERPL-MCNC: 4.7 G/DL (ref 6.4–8.3)
PROT SERPL-MCNC: 4.7 G/DL (ref 6.4–8.3)
PROT SERPL-MCNC: 5.4 G/DL (ref 6.4–8.3)
PROT SERPL-MCNC: 5.7 G/DL (ref 6.4–8.3)
PROT SERPL-MCNC: 6.3 G/DL (ref 6.4–8.3)
PROT SERPL-MCNC: 6.4 G/DL (ref 6.4–8.3)
PROT SERPL-MCNC: 6.4 G/DL (ref 6.4–8.3)
PROT SERPL-MCNC: 6.6 G/DL (ref 6.4–8.3)
PROT SERPL-MCNC: 6.7 G/DL (ref 6.4–8.3)
PROT SERPL-MCNC: 7 G/DL (ref 6.4–8.3)
PROT SERPL-MCNC: 7.1 G/DL (ref 6.4–8.3)
PROT SERPL-MCNC: 7.1 G/DL (ref 6.4–8.3)
PROT SERPL-MCNC: 8 G/DL (ref 6.4–8.3)
PROT SERPL-MCNC: 8 G/DL (ref 6.4–8.3)
PROT/CREAT 24H UR: 0.43 MG/MG CR (ref 0–0.2)
QUANTIFERON MITOGEN: 0.45 IU/ML
QUANTIFERON MITOGEN: 2.19 IU/ML
QUANTIFERON NIL TUBE: 0.03 IU/ML
QUANTIFERON NIL TUBE: 0.04 IU/ML
QUANTIFERON TB1 TUBE: 0.02 IU/ML
QUANTIFERON TB1 TUBE: 0.04 IU/ML
QUANTIFERON TB2 TUBE: 0.04
QUANTIFERON TB2 TUBE: 0.04
RADIOLOGIST FLAGS: ABNORMAL
RADIOLOGIST FLAGS: ABNORMAL
RBC # BLD AUTO: 1.87 10E6/UL (ref 3.8–5.2)
RBC # BLD AUTO: 2.51 10E6/UL (ref 3.8–5.2)
RBC # BLD AUTO: 2.53 10E6/UL (ref 3.8–5.2)
RBC # BLD AUTO: 2.61 10E6/UL (ref 3.8–5.2)
RBC # BLD AUTO: 2.65 10E6/UL (ref 3.8–5.2)
RBC # BLD AUTO: 2.75 10E6/UL (ref 3.8–5.2)
RBC # BLD AUTO: 2.83 10E6/UL (ref 3.8–5.2)
RBC # BLD AUTO: 2.89 10E6/UL (ref 3.8–5.2)
RBC # BLD AUTO: 2.89 10E6/UL (ref 3.8–5.2)
RBC # BLD AUTO: 2.94 10E6/UL (ref 3.8–5.2)
RBC # BLD AUTO: 2.99 10E6/UL (ref 3.8–5.2)
RBC # BLD AUTO: 3.05 10E6/UL (ref 3.8–5.2)
RBC # BLD AUTO: 3.19 10E6/UL (ref 3.8–5.2)
RBC # BLD AUTO: 3.21 10E6/UL (ref 3.8–5.2)
RBC # BLD AUTO: 3.26 10E6/UL (ref 3.8–5.2)
RBC # BLD AUTO: 3.28 10E6/UL (ref 3.8–5.2)
RBC # BLD AUTO: 3.29 10E6/UL (ref 3.8–5.2)
RBC # BLD AUTO: 3.34 10E6/UL (ref 3.8–5.2)
RBC # BLD AUTO: 3.48 10E6/UL (ref 3.8–5.2)
RBC # BLD AUTO: 3.5 10E6/UL (ref 3.8–5.2)
RBC # BLD AUTO: 3.57 10E6/UL (ref 3.8–5.2)
RBC # BLD AUTO: 3.58 10E6/UL (ref 3.8–5.2)
RBC # BLD AUTO: 3.67 10E6/UL (ref 3.8–5.2)
RBC # BLD AUTO: 3.77 10E6/UL (ref 3.8–5.2)
RBC # BLD AUTO: 3.8 10E6/UL (ref 3.8–5.2)
RBC # BLD AUTO: 3.88 10E6/UL (ref 3.8–5.2)
RBC # BLD AUTO: 3.9 10E6/UL (ref 3.8–5.2)
RBC # BLD AUTO: 3.96 10E6/UL (ref 3.8–5.2)
RBC # BLD AUTO: 4.06 10E6/UL (ref 3.8–5.2)
RBC # BLD AUTO: 4.23 10E6/UL (ref 3.8–5.2)
RBC # BLD AUTO: 4.38 10E6/UL (ref 3.8–5.2)
RBC URINE: 2 /HPF
RBC URINE: 29 /HPF
RETICS # AUTO: 0.08 10E6/UL (ref 0.01–0.11)
RETICS/RBC NFR AUTO: 2.3 % (ref 0.8–2.7)
SAO2 % BLDV: 67 % (ref 94–100)
SARS-COV-2 RNA RESP QL NAA+PROBE: NEGATIVE
SODIUM SERPL-SCNC: 132 MMOL/L (ref 136–145)
SODIUM SERPL-SCNC: 133 MMOL/L (ref 136–145)
SODIUM SERPL-SCNC: 134 MMOL/L (ref 136–145)
SODIUM SERPL-SCNC: 135 MMOL/L (ref 136–145)
SODIUM SERPL-SCNC: 136 MMOL/L (ref 136–145)
SODIUM SERPL-SCNC: 137 MMOL/L (ref 136–145)
SODIUM SERPL-SCNC: 138 MMOL/L (ref 136–145)
SODIUM SERPL-SCNC: 139 MMOL/L (ref 136–145)
SODIUM SERPL-SCNC: 140 MMOL/L (ref 136–145)
SODIUM SERPL-SCNC: 141 MMOL/L (ref 136–145)
SODIUM SERPL-SCNC: 141 MMOL/L (ref 136–145)
SODIUM SERPL-SCNC: 142 MMOL/L (ref 136–145)
SODIUM SERPL-SCNC: 142 MMOL/L (ref 136–145)
SP GR UR STRIP: 1.03 (ref 1–1.03)
SP GR UR STRIP: 1.03 (ref 1–1.03)
SPECIMEN EXPIRATION DATE: NORMAL
SQUAMOUS EPITHELIAL: <1 /HPF
TROPONIN T SERPL HS-MCNC: 241 NG/L
TSH SERPL DL<=0.005 MIU/L-ACNC: 0.82 UIU/ML (ref 0.3–4.2)
TSH SERPL DL<=0.005 MIU/L-ACNC: 1.15 UIU/ML (ref 0.3–4.2)
UNIT ABO/RH: NORMAL
UNIT NUMBER: NORMAL
UNIT STATUS: NORMAL
UNIT TYPE ISBT: 7300
UROBILINOGEN UR STRIP-MCNC: 2 MG/DL
UROBILINOGEN UR STRIP-MCNC: <2 MG/DL
VANCOMYCIN SERPL-MCNC: 17 UG/ML
VIT B12 SERPL-MCNC: 792 PG/ML (ref 232–1245)
WBC # BLD AUTO: 10 10E3/UL (ref 4–11)
WBC # BLD AUTO: 10.3 10E3/UL (ref 4–11)
WBC # BLD AUTO: 10.9 10E3/UL (ref 4–11)
WBC # BLD AUTO: 14.1 10E3/UL (ref 4–11)
WBC # BLD AUTO: 16 10E3/UL (ref 4–11)
WBC # BLD AUTO: 4.5 10E3/UL (ref 4–11)
WBC # BLD AUTO: 4.8 10E3/UL (ref 4–11)
WBC # BLD AUTO: 5 10E3/UL (ref 4–11)
WBC # BLD AUTO: 5 10E3/UL (ref 4–11)
WBC # BLD AUTO: 5.1 10E3/UL (ref 4–11)
WBC # BLD AUTO: 5.2 10E3/UL (ref 4–11)
WBC # BLD AUTO: 6.2 10E3/UL (ref 4–11)
WBC # BLD AUTO: 6.2 10E3/UL (ref 4–11)
WBC # BLD AUTO: 6.6 10E3/UL (ref 4–11)
WBC # BLD AUTO: 6.6 10E3/UL (ref 4–11)
WBC # BLD AUTO: 6.7 10E3/UL (ref 4–11)
WBC # BLD AUTO: 7.2 10E3/UL (ref 4–11)
WBC # BLD AUTO: 7.2 10E3/UL (ref 4–11)
WBC # BLD AUTO: 7.4 10E3/UL (ref 4–11)
WBC # BLD AUTO: 8 10E3/UL (ref 4–11)
WBC # BLD AUTO: 8 10E3/UL (ref 4–11)
WBC # BLD AUTO: 8.1 10E3/UL (ref 4–11)
WBC # BLD AUTO: 8.2 10E3/UL (ref 4–11)
WBC # BLD AUTO: 8.9 10E3/UL (ref 4–11)
WBC # BLD AUTO: 9.2 10E3/UL (ref 4–11)
WBC # BLD AUTO: 9.2 10E3/UL (ref 4–11)
WBC # BLD AUTO: 9.3 10E3/UL (ref 4–11)
WBC # BLD AUTO: 9.4 10E3/UL (ref 4–11)
WBC # BLD AUTO: 9.4 10E3/UL (ref 4–11)
WBC CLUMPS #/AREA URNS HPF: PRESENT /HPF
WBC URINE: 2 /HPF
WBC URINE: 31 /HPF

## 2023-01-01 PROCEDURE — G0463 HOSPITAL OUTPT CLINIC VISIT: HCPCS | Mod: 25

## 2023-01-01 PROCEDURE — 97608 NEG PRS WND THER NDME>50SQCM: CPT

## 2023-01-01 PROCEDURE — 95816 EEG AWAKE AND DROWSY: CPT

## 2023-01-01 PROCEDURE — 84132 ASSAY OF SERUM POTASSIUM: CPT | Performed by: INTERNAL MEDICINE

## 2023-01-01 PROCEDURE — 250N000013 HC RX MED GY IP 250 OP 250 PS 637: Performed by: INTERNAL MEDICINE

## 2023-01-01 PROCEDURE — 250N000011 HC RX IP 250 OP 636: Performed by: SPECIALIST

## 2023-01-01 PROCEDURE — 36415 COLL VENOUS BLD VENIPUNCTURE: CPT | Performed by: FAMILY MEDICINE

## 2023-01-01 PROCEDURE — 250N000011 HC RX IP 250 OP 636: Performed by: INTERNAL MEDICINE

## 2023-01-01 PROCEDURE — 97606 NEG PRS WND THER DME>50 SQCM: CPT

## 2023-01-01 PROCEDURE — 250N000009 HC RX 250: Performed by: INTERNAL MEDICINE

## 2023-01-01 PROCEDURE — 85027 COMPLETE CBC AUTOMATED: CPT | Mod: ORL

## 2023-01-01 PROCEDURE — 250N000011 HC RX IP 250 OP 636: Performed by: EMERGENCY MEDICINE

## 2023-01-01 PROCEDURE — 250N000013 HC RX MED GY IP 250 OP 250 PS 637: Performed by: HOSPITALIST

## 2023-01-01 PROCEDURE — 80053 COMPREHEN METABOLIC PANEL: CPT | Performed by: HOSPITALIST

## 2023-01-01 PROCEDURE — 258N000003 HC RX IP 258 OP 636: Performed by: INTERNAL MEDICINE

## 2023-01-01 PROCEDURE — 83735 ASSAY OF MAGNESIUM: CPT | Performed by: HOSPITALIST

## 2023-01-01 PROCEDURE — 999N000157 HC STATISTIC RCP TIME EA 10 MIN

## 2023-01-01 PROCEDURE — 82310 ASSAY OF CALCIUM: CPT | Performed by: HOSPITALIST

## 2023-01-01 PROCEDURE — 258N000001 HC RX 258: Performed by: INTERNAL MEDICINE

## 2023-01-01 PROCEDURE — 120N000001 HC R&B MED SURG/OB

## 2023-01-01 PROCEDURE — 36415 COLL VENOUS BLD VENIPUNCTURE: CPT | Performed by: HOSPITALIST

## 2023-01-01 PROCEDURE — 250N000012 HC RX MED GY IP 250 OP 636 PS 637: Performed by: SPECIALIST

## 2023-01-01 PROCEDURE — 99418 PROLNG IP/OBS E/M EA 15 MIN: CPT | Performed by: NURSE PRACTITIONER

## 2023-01-01 PROCEDURE — 99207 PR CDG-CUT & PASTE-POTENTIAL IMPACT ON LEVEL: CPT | Performed by: HOSPITALIST

## 2023-01-01 PROCEDURE — 85025 COMPLETE CBC W/AUTO DIFF WBC: CPT | Performed by: EMERGENCY MEDICINE

## 2023-01-01 PROCEDURE — 92610 EVALUATE SWALLOWING FUNCTION: CPT | Mod: GN

## 2023-01-01 PROCEDURE — P9016 RBC LEUKOCYTES REDUCED: HCPCS

## 2023-01-01 PROCEDURE — 99232 SBSQ HOSP IP/OBS MODERATE 35: CPT | Performed by: INTERNAL MEDICINE

## 2023-01-01 PROCEDURE — 250N000011 HC RX IP 250 OP 636: Performed by: NURSE ANESTHETIST, CERTIFIED REGISTERED

## 2023-01-01 PROCEDURE — 99285 EMERGENCY DEPT VISIT HI MDM: CPT | Mod: 25

## 2023-01-01 PROCEDURE — 84155 ASSAY OF PROTEIN SERUM: CPT | Mod: ORL

## 2023-01-01 PROCEDURE — 82310 ASSAY OF CALCIUM: CPT | Performed by: INTERNAL MEDICINE

## 2023-01-01 PROCEDURE — 258N000003 HC RX IP 258 OP 636: Performed by: STUDENT IN AN ORGANIZED HEALTH CARE EDUCATION/TRAINING PROGRAM

## 2023-01-01 PROCEDURE — 99233 SBSQ HOSP IP/OBS HIGH 50: CPT | Performed by: INTERNAL MEDICINE

## 2023-01-01 PROCEDURE — 83605 ASSAY OF LACTIC ACID: CPT

## 2023-01-01 PROCEDURE — 250N000013 HC RX MED GY IP 250 OP 250 PS 637: Performed by: SPECIALIST

## 2023-01-01 PROCEDURE — 86140 C-REACTIVE PROTEIN: CPT | Performed by: INTERNAL MEDICINE

## 2023-01-01 PROCEDURE — 83735 ASSAY OF MAGNESIUM: CPT

## 2023-01-01 PROCEDURE — 250N000009 HC RX 250: Performed by: HOSPITALIST

## 2023-01-01 PROCEDURE — 84132 ASSAY OF SERUM POTASSIUM: CPT | Performed by: HOSPITALIST

## 2023-01-01 PROCEDURE — 250N000011 HC RX IP 250 OP 636: Performed by: HOSPITALIST

## 2023-01-01 PROCEDURE — 96376 TX/PRO/DX INJ SAME DRUG ADON: CPT | Mod: XU

## 2023-01-01 PROCEDURE — 99231 SBSQ HOSP IP/OBS SF/LOW 25: CPT | Performed by: PHYSICIAN ASSISTANT

## 2023-01-01 PROCEDURE — 82248 BILIRUBIN DIRECT: CPT | Performed by: EMERGENCY MEDICINE

## 2023-01-01 PROCEDURE — 80076 HEPATIC FUNCTION PANEL: CPT | Performed by: INTERNAL MEDICINE

## 2023-01-01 PROCEDURE — P9604 ONE-WAY ALLOW PRORATED TRIP: HCPCS | Mod: ORL | Performed by: INTERNAL MEDICINE

## 2023-01-01 PROCEDURE — 82565 ASSAY OF CREATININE: CPT | Performed by: INTERNAL MEDICINE

## 2023-01-01 PROCEDURE — 83735 ASSAY OF MAGNESIUM: CPT | Performed by: INTERNAL MEDICINE

## 2023-01-01 PROCEDURE — 250N000009 HC RX 250: Performed by: ANESTHESIOLOGY

## 2023-01-01 PROCEDURE — 82565 ASSAY OF CREATININE: CPT | Performed by: HOSPITALIST

## 2023-01-01 PROCEDURE — 36415 COLL VENOUS BLD VENIPUNCTURE: CPT | Performed by: EMERGENCY MEDICINE

## 2023-01-01 PROCEDURE — 99207 PR NO BILLABLE SERVICE THIS VISIT: CPT | Performed by: NURSE PRACTITIONER

## 2023-01-01 PROCEDURE — G0378 HOSPITAL OBSERVATION PER HR: HCPCS

## 2023-01-01 PROCEDURE — 85610 PROTHROMBIN TIME: CPT | Performed by: INTERNAL MEDICINE

## 2023-01-01 PROCEDURE — 87040 BLOOD CULTURE FOR BACTERIA: CPT | Performed by: EMERGENCY MEDICINE

## 2023-01-01 PROCEDURE — 96361 HYDRATE IV INFUSION ADD-ON: CPT

## 2023-01-01 PROCEDURE — 85018 HEMOGLOBIN: CPT | Performed by: HOSPITALIST

## 2023-01-01 PROCEDURE — 258N000003 HC RX IP 258 OP 636: Performed by: FAMILY MEDICINE

## 2023-01-01 PROCEDURE — 84100 ASSAY OF PHOSPHORUS: CPT | Performed by: HOSPITALIST

## 2023-01-01 PROCEDURE — 250N000013 HC RX MED GY IP 250 OP 250 PS 637: Performed by: STUDENT IN AN ORGANIZED HEALTH CARE EDUCATION/TRAINING PROGRAM

## 2023-01-01 PROCEDURE — 86140 C-REACTIVE PROTEIN: CPT | Performed by: STUDENT IN AN ORGANIZED HEALTH CARE EDUCATION/TRAINING PROGRAM

## 2023-01-01 PROCEDURE — 82962 GLUCOSE BLOOD TEST: CPT

## 2023-01-01 PROCEDURE — 999N000065 XR ABDOMEN PORT 1 VIEW

## 2023-01-01 PROCEDURE — 94003 VENT MGMT INPAT SUBQ DAY: CPT

## 2023-01-01 PROCEDURE — 87070 CULTURE OTHR SPECIMN AEROBIC: CPT | Performed by: EMERGENCY MEDICINE

## 2023-01-01 PROCEDURE — 99498 ADVNCD CARE PLAN ADDL 30 MIN: CPT | Performed by: NURSE PRACTITIONER

## 2023-01-01 PROCEDURE — 86481 TB AG RESPONSE T-CELL SUSP: CPT | Performed by: NURSE PRACTITIONER

## 2023-01-01 PROCEDURE — 99232 SBSQ HOSP IP/OBS MODERATE 35: CPT | Performed by: HOSPITALIST

## 2023-01-01 PROCEDURE — 99233 SBSQ HOSP IP/OBS HIGH 50: CPT | Performed by: NURSE PRACTITIONER

## 2023-01-01 PROCEDURE — 99309 SBSQ NF CARE MODERATE MDM 30: CPT

## 2023-01-01 PROCEDURE — 710N000009 HC RECOVERY PHASE 1, LEVEL 1, PER MIN: Performed by: SPECIALIST

## 2023-01-01 PROCEDURE — 258N000003 HC RX IP 258 OP 636

## 2023-01-01 PROCEDURE — 84100 ASSAY OF PHOSPHORUS: CPT | Performed by: INTERNAL MEDICINE

## 2023-01-01 PROCEDURE — 36415 COLL VENOUS BLD VENIPUNCTURE: CPT

## 2023-01-01 PROCEDURE — 272N000001 HC OR GENERAL SUPPLY STERILE: Performed by: SPECIALIST

## 2023-01-01 PROCEDURE — 99231 SBSQ HOSP IP/OBS SF/LOW 25: CPT | Performed by: HOSPITALIST

## 2023-01-01 PROCEDURE — 0QB60ZZ EXCISION OF RIGHT UPPER FEMUR, OPEN APPROACH: ICD-10-PCS | Performed by: SPECIALIST

## 2023-01-01 PROCEDURE — 99231 SBSQ HOSP IP/OBS SF/LOW 25: CPT | Performed by: INTERNAL MEDICINE

## 2023-01-01 PROCEDURE — 85014 HEMATOCRIT: CPT | Performed by: HOSPITALIST

## 2023-01-01 PROCEDURE — 99222 1ST HOSP IP/OBS MODERATE 55: CPT | Mod: 25 | Performed by: SPECIALIST

## 2023-01-01 PROCEDURE — 36415 COLL VENOUS BLD VENIPUNCTURE: CPT | Mod: ORL | Performed by: INTERNAL MEDICINE

## 2023-01-01 PROCEDURE — 36569 INSJ PICC 5 YR+ W/O IMAGING: CPT

## 2023-01-01 PROCEDURE — 250N000011 HC RX IP 250 OP 636

## 2023-01-01 PROCEDURE — F08G5YZ WOUND MANAGEMENT TREATMENT OF INTEGUMENTARY SYSTEM - LOWER BACK / LOWER EXTREMITY USING OTHER EQUIPMENT: ICD-10-PCS | Performed by: INTERNAL MEDICINE

## 2023-01-01 PROCEDURE — 99233 SBSQ HOSP IP/OBS HIGH 50: CPT | Performed by: HOSPITALIST

## 2023-01-01 PROCEDURE — 36620 INSERTION CATHETER ARTERY: CPT | Performed by: INTERNAL MEDICINE

## 2023-01-01 PROCEDURE — 85027 COMPLETE CBC AUTOMATED: CPT | Performed by: INTERNAL MEDICINE

## 2023-01-01 PROCEDURE — 250N000025 HC SEVOFLURANE, PER MIN: Performed by: SPECIALIST

## 2023-01-01 PROCEDURE — 200N000001 HC R&B ICU

## 2023-01-01 PROCEDURE — 85045 AUTOMATED RETICULOCYTE COUNT: CPT | Performed by: STUDENT IN AN ORGANIZED HEALTH CARE EDUCATION/TRAINING PROGRAM

## 2023-01-01 PROCEDURE — 81001 URINALYSIS AUTO W/SCOPE: CPT | Performed by: EMERGENCY MEDICINE

## 2023-01-01 PROCEDURE — 258N000003 HC RX IP 258 OP 636: Performed by: SPECIALIST

## 2023-01-01 PROCEDURE — 80048 BASIC METABOLIC PNL TOTAL CA: CPT | Performed by: HOSPITALIST

## 2023-01-01 PROCEDURE — 80048 BASIC METABOLIC PNL TOTAL CA: CPT | Performed by: INTERNAL MEDICINE

## 2023-01-01 PROCEDURE — 93005 ELECTROCARDIOGRAM TRACING: CPT | Performed by: EMERGENCY MEDICINE

## 2023-01-01 PROCEDURE — 96372 THER/PROPH/DIAG INJ SC/IM: CPT | Performed by: INTERNAL MEDICINE

## 2023-01-01 PROCEDURE — 258N000003 HC RX IP 258 OP 636: Performed by: EMERGENCY MEDICINE

## 2023-01-01 PROCEDURE — 250N000011 HC RX IP 250 OP 636: Performed by: STUDENT IN AN ORGANIZED HEALTH CARE EDUCATION/TRAINING PROGRAM

## 2023-01-01 PROCEDURE — 82746 ASSAY OF FOLIC ACID SERUM: CPT | Performed by: INTERNAL MEDICINE

## 2023-01-01 PROCEDURE — 85027 COMPLETE CBC AUTOMATED: CPT | Performed by: NURSE PRACTITIONER

## 2023-01-01 PROCEDURE — 85018 HEMOGLOBIN: CPT | Performed by: INTERNAL MEDICINE

## 2023-01-01 PROCEDURE — 99497 ADVNCD CARE PLAN 30 MIN: CPT | Performed by: NURSE PRACTITIONER

## 2023-01-01 PROCEDURE — 85025 COMPLETE CBC W/AUTO DIFF WBC: CPT | Performed by: HOSPITALIST

## 2023-01-01 PROCEDURE — 250N000011 HC RX IP 250 OP 636: Performed by: ANESTHESIOLOGY

## 2023-01-01 PROCEDURE — 999N000065 XR CHEST PORT 1 VIEW

## 2023-01-01 PROCEDURE — 99310 SBSQ NF CARE HIGH MDM 45: CPT

## 2023-01-01 PROCEDURE — 85049 AUTOMATED PLATELET COUNT: CPT | Performed by: INTERNAL MEDICINE

## 2023-01-01 PROCEDURE — 250N000011 HC RX IP 250 OP 636: Performed by: FAMILY MEDICINE

## 2023-01-01 PROCEDURE — 250N000012 HC RX MED GY IP 250 OP 636 PS 637: Performed by: INTERNAL MEDICINE

## 2023-01-01 PROCEDURE — 85014 HEMATOCRIT: CPT | Performed by: NURSE PRACTITIONER

## 2023-01-01 PROCEDURE — 99232 SBSQ HOSP IP/OBS MODERATE 35: CPT | Mod: 25 | Performed by: NURSE PRACTITIONER

## 2023-01-01 PROCEDURE — 11042 DBRDMT SUBQ TIS 1ST 20SQCM/<: CPT | Performed by: SPECIALIST

## 2023-01-01 PROCEDURE — 83605 ASSAY OF LACTIC ACID: CPT | Performed by: INTERNAL MEDICINE

## 2023-01-01 PROCEDURE — 86038 ANTINUCLEAR ANTIBODIES: CPT | Mod: ORL | Performed by: INTERNAL MEDICINE

## 2023-01-01 PROCEDURE — 3E043XZ INTRODUCTION OF VASOPRESSOR INTO CENTRAL VEIN, PERCUTANEOUS APPROACH: ICD-10-PCS | Performed by: INTERNAL MEDICINE

## 2023-01-01 PROCEDURE — 86850 RBC ANTIBODY SCREEN: CPT | Performed by: INTERNAL MEDICINE

## 2023-01-01 PROCEDURE — 80048 BASIC METABOLIC PNL TOTAL CA: CPT | Performed by: STUDENT IN AN ORGANIZED HEALTH CARE EDUCATION/TRAINING PROGRAM

## 2023-01-01 PROCEDURE — 36415 COLL VENOUS BLD VENIPUNCTURE: CPT | Performed by: INTERNAL MEDICINE

## 2023-01-01 PROCEDURE — 80053 COMPREHEN METABOLIC PANEL: CPT | Performed by: EMERGENCY MEDICINE

## 2023-01-01 PROCEDURE — 80048 BASIC METABOLIC PNL TOTAL CA: CPT

## 2023-01-01 PROCEDURE — 99239 HOSP IP/OBS DSCHRG MGMT >30: CPT | Performed by: INTERNAL MEDICINE

## 2023-01-01 PROCEDURE — G0463 HOSPITAL OUTPT CLINIC VISIT: HCPCS

## 2023-01-01 PROCEDURE — 85014 HEMATOCRIT: CPT | Performed by: INTERNAL MEDICINE

## 2023-01-01 PROCEDURE — 74018 RADEX ABDOMEN 1 VIEW: CPT

## 2023-01-01 PROCEDURE — 36415 COLL VENOUS BLD VENIPUNCTURE: CPT | Performed by: STUDENT IN AN ORGANIZED HEALTH CARE EDUCATION/TRAINING PROGRAM

## 2023-01-01 PROCEDURE — 82805 BLOOD GASES W/O2 SATURATION: CPT | Performed by: INTERNAL MEDICINE

## 2023-01-01 PROCEDURE — 82803 BLOOD GASES ANY COMBINATION: CPT | Performed by: INTERNAL MEDICINE

## 2023-01-01 PROCEDURE — 82310 ASSAY OF CALCIUM: CPT | Performed by: NURSE PRACTITIONER

## 2023-01-01 PROCEDURE — 87077 CULTURE AEROBIC IDENTIFY: CPT | Performed by: SPECIALIST

## 2023-01-01 PROCEDURE — 96360 HYDRATION IV INFUSION INIT: CPT | Mod: XU

## 2023-01-01 PROCEDURE — 86235 NUCLEAR ANTIGEN ANTIBODY: CPT | Performed by: STUDENT IN AN ORGANIZED HEALTH CARE EDUCATION/TRAINING PROGRAM

## 2023-01-01 PROCEDURE — 80053 COMPREHEN METABOLIC PANEL: CPT

## 2023-01-01 PROCEDURE — 99309 SBSQ NF CARE MODERATE MDM 30: CPT | Performed by: INTERNAL MEDICINE

## 2023-01-01 PROCEDURE — 87205 SMEAR GRAM STAIN: CPT | Performed by: SPECIALIST

## 2023-01-01 PROCEDURE — 83615 LACTATE (LD) (LDH) ENZYME: CPT | Performed by: STUDENT IN AN ORGANIZED HEALTH CARE EDUCATION/TRAINING PROGRAM

## 2023-01-01 PROCEDURE — 99309 SBSQ NF CARE MODERATE MDM 30: CPT | Performed by: NURSE PRACTITIONER

## 2023-01-01 PROCEDURE — 92526 ORAL FUNCTION THERAPY: CPT | Mod: GN

## 2023-01-01 PROCEDURE — 72193 CT PELVIS W/DYE: CPT

## 2023-01-01 PROCEDURE — 86923 COMPATIBILITY TEST ELECTRIC: CPT | Performed by: INTERNAL MEDICINE

## 2023-01-01 PROCEDURE — 85027 COMPLETE CBC AUTOMATED: CPT

## 2023-01-01 PROCEDURE — 82607 VITAMIN B-12: CPT | Performed by: INTERNAL MEDICINE

## 2023-01-01 PROCEDURE — 87635 SARS-COV-2 COVID-19 AMP PRB: CPT | Mod: ORL

## 2023-01-01 PROCEDURE — 99232 SBSQ HOSP IP/OBS MODERATE 35: CPT | Performed by: STUDENT IN AN ORGANIZED HEALTH CARE EDUCATION/TRAINING PROGRAM

## 2023-01-01 PROCEDURE — 80053 COMPREHEN METABOLIC PANEL: CPT | Performed by: INTERNAL MEDICINE

## 2023-01-01 PROCEDURE — 250N000009 HC RX 250: Performed by: STUDENT IN AN ORGANIZED HEALTH CARE EDUCATION/TRAINING PROGRAM

## 2023-01-01 PROCEDURE — U0005 INFEC AGEN DETEC AMPLI PROBE: HCPCS | Performed by: NURSE PRACTITIONER

## 2023-01-01 PROCEDURE — 99207 PR NO CHARGE LOS: CPT | Performed by: NURSE PRACTITIONER

## 2023-01-01 PROCEDURE — 250N000009 HC RX 250: Performed by: SPECIALIST

## 2023-01-01 PROCEDURE — P9604 ONE-WAY ALLOW PRORATED TRIP: HCPCS | Performed by: NURSE PRACTITIONER

## 2023-01-01 PROCEDURE — U0003 INFECTIOUS AGENT DETECTION BY NUCLEIC ACID (DNA OR RNA); SEVERE ACUTE RESPIRATORY SYNDROME CORONAVIRUS 2 (SARS-COV-2) (CORONAVIRUS DISEASE [COVID-19]), AMPLIFIED PROBE TECHNIQUE, MAKING USE OF HIGH THROUGHPUT TECHNOLOGIES AS DESCRIBED BY CMS-2020-01-R: HCPCS | Performed by: NURSE PRACTITIONER

## 2023-01-01 PROCEDURE — 82330 ASSAY OF CALCIUM: CPT | Performed by: FAMILY MEDICINE

## 2023-01-01 PROCEDURE — 70450 CT HEAD/BRAIN W/O DYE: CPT

## 2023-01-01 PROCEDURE — 36415 COLL VENOUS BLD VENIPUNCTURE: CPT | Performed by: NURSE PRACTITIONER

## 2023-01-01 PROCEDURE — 36556 INSERT NON-TUNNEL CV CATH: CPT | Performed by: INTERNAL MEDICINE

## 2023-01-01 PROCEDURE — 999N000197 HC STATISTIC WOC PT EDUCATION, 0-15 MIN

## 2023-01-01 PROCEDURE — 85027 COMPLETE CBC AUTOMATED: CPT | Performed by: EMERGENCY MEDICINE

## 2023-01-01 PROCEDURE — 96367 TX/PROPH/DG ADDL SEQ IV INF: CPT

## 2023-01-01 PROCEDURE — 250N000012 HC RX MED GY IP 250 OP 636 PS 637: Performed by: EMERGENCY MEDICINE

## 2023-01-01 PROCEDURE — 70551 MRI BRAIN STEM W/O DYE: CPT

## 2023-01-01 PROCEDURE — C9113 INJ PANTOPRAZOLE SODIUM, VIA: HCPCS | Performed by: INTERNAL MEDICINE

## 2023-01-01 PROCEDURE — 99291 CRITICAL CARE FIRST HOUR: CPT | Mod: 25 | Performed by: INTERNAL MEDICINE

## 2023-01-01 PROCEDURE — 86901 BLOOD TYPING SEROLOGIC RH(D): CPT | Performed by: INTERNAL MEDICINE

## 2023-01-01 PROCEDURE — 86225 DNA ANTIBODY NATIVE: CPT | Performed by: STUDENT IN AN ORGANIZED HEALTH CARE EDUCATION/TRAINING PROGRAM

## 2023-01-01 PROCEDURE — 99231 SBSQ HOSP IP/OBS SF/LOW 25: CPT | Performed by: NURSE PRACTITIONER

## 2023-01-01 PROCEDURE — 99231 SBSQ HOSP IP/OBS SF/LOW 25: CPT | Mod: 25 | Performed by: PHYSICIAN ASSISTANT

## 2023-01-01 PROCEDURE — 84145 PROCALCITONIN (PCT): CPT | Performed by: EMERGENCY MEDICINE

## 2023-01-01 PROCEDURE — 83735 ASSAY OF MAGNESIUM: CPT | Performed by: SPECIALIST

## 2023-01-01 PROCEDURE — 5A1945Z RESPIRATORY VENTILATION, 24-96 CONSECUTIVE HOURS: ICD-10-PCS | Performed by: INTERNAL MEDICINE

## 2023-01-01 PROCEDURE — 84132 ASSAY OF SERUM POTASSIUM: CPT | Performed by: SPECIALIST

## 2023-01-01 PROCEDURE — 999N000248 HC STATISTIC IV INSERT WITH US BY RN

## 2023-01-01 PROCEDURE — 999N000141 HC STATISTIC PRE-PROCEDURE NURSING ASSESSMENT: Performed by: INTERNAL MEDICINE

## 2023-01-01 PROCEDURE — 250N000013 HC RX MED GY IP 250 OP 250 PS 637

## 2023-01-01 PROCEDURE — 96366 THER/PROPH/DIAG IV INF ADDON: CPT

## 2023-01-01 PROCEDURE — 250N000013 HC RX MED GY IP 250 OP 250 PS 637: Performed by: NURSE PRACTITIONER

## 2023-01-01 PROCEDURE — 96361 HYDRATE IV INFUSION ADD-ON: CPT | Mod: XU

## 2023-01-01 PROCEDURE — 85652 RBC SED RATE AUTOMATED: CPT | Performed by: EMERGENCY MEDICINE

## 2023-01-01 PROCEDURE — 272N000452 HC KIT SHRLOCK 5FR POWER PICC TRIPLE LUMEN

## 2023-01-01 PROCEDURE — 83735 ASSAY OF MAGNESIUM: CPT | Performed by: EMERGENCY MEDICINE

## 2023-01-01 PROCEDURE — 96374 THER/PROPH/DIAG INJ IV PUSH: CPT | Mod: XU

## 2023-01-01 PROCEDURE — 99418 PROLNG IP/OBS E/M EA 15 MIN: CPT | Performed by: SURGERY

## 2023-01-01 PROCEDURE — 80053 COMPREHEN METABOLIC PANEL: CPT | Performed by: FAMILY MEDICINE

## 2023-01-01 PROCEDURE — 83735 ASSAY OF MAGNESIUM: CPT | Performed by: NURSE PRACTITIONER

## 2023-01-01 PROCEDURE — 99222 1ST HOSP IP/OBS MODERATE 55: CPT | Performed by: FAMILY MEDICINE

## 2023-01-01 PROCEDURE — 99231 SBSQ HOSP IP/OBS SF/LOW 25: CPT | Performed by: SPECIALIST

## 2023-01-01 PROCEDURE — 99306 1ST NF CARE HIGH MDM 50: CPT | Performed by: INTERNAL MEDICINE

## 2023-01-01 PROCEDURE — 86038 ANTINUCLEAR ANTIBODIES: CPT | Performed by: INTERNAL MEDICINE

## 2023-01-01 PROCEDURE — 80202 ASSAY OF VANCOMYCIN: CPT | Performed by: INTERNAL MEDICINE

## 2023-01-01 PROCEDURE — 76705 ECHO EXAM OF ABDOMEN: CPT

## 2023-01-01 PROCEDURE — 96360 HYDRATION IV INFUSION INIT: CPT

## 2023-01-01 PROCEDURE — 74340 X-RAY GUIDE FOR GI TUBE: CPT

## 2023-01-01 PROCEDURE — 85025 COMPLETE CBC W/AUTO DIFF WBC: CPT | Performed by: STUDENT IN AN ORGANIZED HEALTH CARE EDUCATION/TRAINING PROGRAM

## 2023-01-01 PROCEDURE — 0JB90ZZ EXCISION OF BUTTOCK SUBCUTANEOUS TISSUE AND FASCIA, OPEN APPROACH: ICD-10-PCS | Performed by: SPECIALIST

## 2023-01-01 PROCEDURE — 87077 CULTURE AEROBIC IDENTIFY: CPT | Performed by: EMERGENCY MEDICINE

## 2023-01-01 PROCEDURE — 80048 BASIC METABOLIC PNL TOTAL CA: CPT | Performed by: NURSE PRACTITIONER

## 2023-01-01 PROCEDURE — 87075 CULTR BACTERIA EXCEPT BLOOD: CPT | Performed by: SPECIALIST

## 2023-01-01 PROCEDURE — 97161 PT EVAL LOW COMPLEX 20 MIN: CPT | Mod: GP

## 2023-01-01 PROCEDURE — 86140 C-REACTIVE PROTEIN: CPT | Performed by: EMERGENCY MEDICINE

## 2023-01-01 PROCEDURE — 258N000003 HC RX IP 258 OP 636: Performed by: HOSPITALIST

## 2023-01-01 PROCEDURE — 99223 1ST HOSP IP/OBS HIGH 75: CPT | Performed by: INTERNAL MEDICINE

## 2023-01-01 PROCEDURE — 96372 THER/PROPH/DIAG INJ SC/IM: CPT | Mod: XU | Performed by: INTERNAL MEDICINE

## 2023-01-01 PROCEDURE — 80048 BASIC METABOLIC PNL TOTAL CA: CPT | Mod: ORL | Performed by: INTERNAL MEDICINE

## 2023-01-01 PROCEDURE — 88307 TISSUE EXAM BY PATHOLOGIST: CPT | Mod: TC | Performed by: SPECIALIST

## 2023-01-01 PROCEDURE — 11042 DBRDMT SUBQ TIS 1ST 20SQCM/<: CPT | Performed by: PHYSICIAN ASSISTANT

## 2023-01-01 PROCEDURE — 83036 HEMOGLOBIN GLYCOSYLATED A1C: CPT | Performed by: INTERNAL MEDICINE

## 2023-01-01 PROCEDURE — 86923 COMPATIBILITY TEST ELECTRIC: CPT

## 2023-01-01 PROCEDURE — 87086 URINE CULTURE/COLONY COUNT: CPT | Performed by: EMERGENCY MEDICINE

## 2023-01-01 PROCEDURE — 94002 VENT MGMT INPAT INIT DAY: CPT

## 2023-01-01 PROCEDURE — 84295 ASSAY OF SERUM SODIUM: CPT | Performed by: HOSPITALIST

## 2023-01-01 PROCEDURE — 99291 CRITICAL CARE FIRST HOUR: CPT | Performed by: INTERNAL MEDICINE

## 2023-01-01 PROCEDURE — 95816 EEG AWAKE AND DROWSY: CPT | Mod: 26 | Performed by: PSYCHIATRY & NEUROLOGY

## 2023-01-01 PROCEDURE — 99223 1ST HOSP IP/OBS HIGH 75: CPT | Mod: AI | Performed by: INTERNAL MEDICINE

## 2023-01-01 PROCEDURE — 99233 SBSQ HOSP IP/OBS HIGH 50: CPT | Performed by: STUDENT IN AN ORGANIZED HEALTH CARE EDUCATION/TRAINING PROGRAM

## 2023-01-01 PROCEDURE — 92610 EVALUATE SWALLOWING FUNCTION: CPT | Mod: GN | Performed by: SPEECH-LANGUAGE PATHOLOGIST

## 2023-01-01 PROCEDURE — 99231 SBSQ HOSP IP/OBS SF/LOW 25: CPT | Performed by: PSYCHIATRY & NEUROLOGY

## 2023-01-01 PROCEDURE — 83550 IRON BINDING TEST: CPT | Performed by: STUDENT IN AN ORGANIZED HEALTH CARE EDUCATION/TRAINING PROGRAM

## 2023-01-01 PROCEDURE — 99418 PROLNG IP/OBS E/M EA 15 MIN: CPT

## 2023-01-01 PROCEDURE — 84156 ASSAY OF PROTEIN URINE: CPT | Performed by: STUDENT IN AN ORGANIZED HEALTH CARE EDUCATION/TRAINING PROGRAM

## 2023-01-01 PROCEDURE — 250N000009 HC RX 250: Performed by: NURSE ANESTHETIST, CERTIFIED REGISTERED

## 2023-01-01 PROCEDURE — 83605 ASSAY OF LACTIC ACID: CPT | Performed by: EMERGENCY MEDICINE

## 2023-01-01 PROCEDURE — 97602 WOUND(S) CARE NON-SELECTIVE: CPT

## 2023-01-01 PROCEDURE — 84443 ASSAY THYROID STIM HORMONE: CPT | Performed by: INTERNAL MEDICINE

## 2023-01-01 PROCEDURE — 88304 TISSUE EXAM BY PATHOLOGIST: CPT | Mod: 26 | Performed by: PATHOLOGY

## 2023-01-01 PROCEDURE — 360N000075 HC SURGERY LEVEL 2, PER MIN: Performed by: SPECIALIST

## 2023-01-01 PROCEDURE — 250N000012 HC RX MED GY IP 250 OP 636 PS 637: Performed by: HOSPITALIST

## 2023-01-01 PROCEDURE — 85027 COMPLETE CBC AUTOMATED: CPT | Performed by: SPECIALIST

## 2023-01-01 PROCEDURE — 272N000748 HC KIT, CATH 3FR OR 4FR SINGLE LUMEN POWERMIDLINE

## 2023-01-01 PROCEDURE — 370N000003 HC ANESTHESIA WARD SERVICE

## 2023-01-01 PROCEDURE — 99239 HOSP IP/OBS DSCHRG MGMT >30: CPT | Performed by: STUDENT IN AN ORGANIZED HEALTH CARE EDUCATION/TRAINING PROGRAM

## 2023-01-01 PROCEDURE — 82374 ASSAY BLOOD CARBON DIOXIDE: CPT | Performed by: NURSE PRACTITIONER

## 2023-01-01 PROCEDURE — 71250 CT THORAX DX C-: CPT

## 2023-01-01 PROCEDURE — 999N000141 HC STATISTIC PRE-PROCEDURE NURSING ASSESSMENT: Performed by: SPECIALIST

## 2023-01-01 PROCEDURE — 83010 ASSAY OF HAPTOGLOBIN QUANT: CPT | Performed by: STUDENT IN AN ORGANIZED HEALTH CARE EDUCATION/TRAINING PROGRAM

## 2023-01-01 PROCEDURE — 99232 SBSQ HOSP IP/OBS MODERATE 35: CPT | Performed by: SURGERY

## 2023-01-01 PROCEDURE — 99223 1ST HOSP IP/OBS HIGH 75: CPT | Performed by: FAMILY MEDICINE

## 2023-01-01 PROCEDURE — 84134 ASSAY OF PREALBUMIN: CPT | Mod: ORL | Performed by: INTERNAL MEDICINE

## 2023-01-01 PROCEDURE — 11045 DBRDMT SUBQ TISS EACH ADDL: CPT | Performed by: SPECIALIST

## 2023-01-01 PROCEDURE — 99310 SBSQ NF CARE HIGH MDM 45: CPT | Performed by: INTERNAL MEDICINE

## 2023-01-01 PROCEDURE — 87086 URINE CULTURE/COLONY COUNT: CPT | Performed by: FAMILY MEDICINE

## 2023-01-01 PROCEDURE — 258N000001 HC RX 258: Performed by: SPECIALIST

## 2023-01-01 PROCEDURE — 258N000003 HC RX IP 258 OP 636: Performed by: NURSE ANESTHETIST, CERTIFIED REGISTERED

## 2023-01-01 PROCEDURE — P9604 ONE-WAY ALLOW PRORATED TRIP: HCPCS

## 2023-01-01 PROCEDURE — 82533 TOTAL CORTISOL: CPT | Performed by: INTERNAL MEDICINE

## 2023-01-01 PROCEDURE — 99223 1ST HOSP IP/OBS HIGH 75: CPT | Performed by: NURSE PRACTITIONER

## 2023-01-01 PROCEDURE — 84155 ASSAY OF PROTEIN SERUM: CPT | Performed by: FAMILY MEDICINE

## 2023-01-01 PROCEDURE — 96365 THER/PROPH/DIAG IV INF INIT: CPT

## 2023-01-01 PROCEDURE — 83735 ASSAY OF MAGNESIUM: CPT | Mod: ORL

## 2023-01-01 PROCEDURE — 370N000017 HC ANESTHESIA TECHNICAL FEE, PER MIN: Performed by: SPECIALIST

## 2023-01-01 PROCEDURE — 85049 AUTOMATED PLATELET COUNT: CPT | Performed by: HOSPITALIST

## 2023-01-01 PROCEDURE — 99222 1ST HOSP IP/OBS MODERATE 55: CPT | Performed by: INTERNAL MEDICINE

## 2023-01-01 PROCEDURE — 99497 ADVNCD CARE PLAN 30 MIN: CPT | Mod: 25 | Performed by: NURSE PRACTITIONER

## 2023-01-01 PROCEDURE — 85025 COMPLETE CBC W/AUTO DIFF WBC: CPT | Performed by: FAMILY MEDICINE

## 2023-01-01 PROCEDURE — 85730 THROMBOPLASTIN TIME PARTIAL: CPT | Performed by: INTERNAL MEDICINE

## 2023-01-01 PROCEDURE — 999N000147 HC STATISTIC PT IP EVAL DEFER

## 2023-01-01 PROCEDURE — 99223 1ST HOSP IP/OBS HIGH 75: CPT | Performed by: PSYCHIATRY & NEUROLOGY

## 2023-01-01 PROCEDURE — 96376 TX/PRO/DX INJ SAME DRUG ADON: CPT

## 2023-01-01 PROCEDURE — 84484 ASSAY OF TROPONIN QUANT: CPT | Performed by: INTERNAL MEDICINE

## 2023-01-01 PROCEDURE — 250N000009 HC RX 250: Performed by: FAMILY MEDICINE

## 2023-01-01 PROCEDURE — 96375 TX/PRO/DX INJ NEW DRUG ADDON: CPT | Mod: XU

## 2023-01-01 PROCEDURE — 84443 ASSAY THYROID STIM HORMONE: CPT | Performed by: EMERGENCY MEDICINE

## 2023-01-01 PROCEDURE — 85025 COMPLETE CBC W/AUTO DIFF WBC: CPT | Performed by: INTERNAL MEDICINE

## 2023-01-01 PROCEDURE — 84100 ASSAY OF PHOSPHORUS: CPT

## 2023-01-01 PROCEDURE — 84460 ALANINE AMINO (ALT) (SGPT): CPT | Performed by: INTERNAL MEDICINE

## 2023-01-01 RX ORDER — MULTIVIT WITH MINERALS/LUTEIN
250 TABLET ORAL DAILY
Status: DISCONTINUED | OUTPATIENT
Start: 2023-01-01 | End: 2023-01-01

## 2023-01-01 RX ORDER — OXYCODONE HYDROCHLORIDE 5 MG/1
2.5 TABLET ORAL EVERY 4 HOURS PRN
Qty: 15 TABLET | Refills: 0 | Status: SHIPPED | OUTPATIENT
Start: 2023-01-01 | End: 2023-01-01

## 2023-01-01 RX ORDER — POTASSIUM CHLORIDE 7.45 MG/ML
10 INJECTION INTRAVENOUS
Status: COMPLETED | OUTPATIENT
Start: 2023-01-01 | End: 2023-01-01

## 2023-01-01 RX ORDER — POLYVINYL ALCOHOL 14 MG/ML
1 SOLUTION/ DROPS OPHTHALMIC 3 TIMES DAILY
Status: DISCONTINUED | OUTPATIENT
Start: 2023-01-01 | End: 2023-01-01 | Stop reason: HOSPADM

## 2023-01-01 RX ORDER — ACETAMINOPHEN 325 MG/1
650 TABLET ORAL EVERY 6 HOURS PRN
Status: DISCONTINUED | OUTPATIENT
Start: 2023-01-01 | End: 2023-01-01

## 2023-01-01 RX ORDER — TRAZODONE HYDROCHLORIDE 50 MG/1
50 TABLET, FILM COATED ORAL
Status: DISCONTINUED | OUTPATIENT
Start: 2023-01-01 | End: 2023-01-01

## 2023-01-01 RX ORDER — OXYCODONE HYDROCHLORIDE 5 MG/1
5 TABLET ORAL EVERY 4 HOURS PRN
Status: DISCONTINUED | OUTPATIENT
Start: 2023-01-01 | End: 2023-01-01 | Stop reason: HOSPADM

## 2023-01-01 RX ORDER — OXYCODONE HYDROCHLORIDE 5 MG/1
TABLET ORAL
Qty: 20 TABLET | Refills: 0 | Status: SHIPPED | OUTPATIENT
Start: 2023-01-01 | End: 2023-01-01 | Stop reason: DRUGHIGH

## 2023-01-01 RX ORDER — CLINDAMYCIN PHOSPHATE 900 MG/50ML
900 INJECTION, SOLUTION INTRAVENOUS EVERY 8 HOURS
Status: DISCONTINUED | OUTPATIENT
Start: 2023-01-01 | End: 2023-01-01

## 2023-01-01 RX ORDER — MAGNESIUM HYDROXIDE 1200 MG/15ML
LIQUID ORAL PRN
Status: DISCONTINUED | OUTPATIENT
Start: 2023-01-01 | End: 2023-01-01 | Stop reason: HOSPADM

## 2023-01-01 RX ORDER — DEXTROSE MONOHYDRATE 25 G/50ML
25-50 INJECTION, SOLUTION INTRAVENOUS
Status: DISCONTINUED | OUTPATIENT
Start: 2023-01-01 | End: 2023-01-01 | Stop reason: HOSPADM

## 2023-01-01 RX ORDER — LATANOPROST 50 UG/ML
1 SOLUTION/ DROPS OPHTHALMIC AT BEDTIME
Status: DISCONTINUED | OUTPATIENT
Start: 2023-01-01 | End: 2023-01-01 | Stop reason: HOSPADM

## 2023-01-01 RX ORDER — ASPIRIN 81 MG/1
81 TABLET, CHEWABLE ORAL DAILY
Status: DISCONTINUED | OUTPATIENT
Start: 2023-01-01 | End: 2023-01-01 | Stop reason: HOSPADM

## 2023-01-01 RX ORDER — NALOXONE HYDROCHLORIDE 0.4 MG/ML
0.4 INJECTION, SOLUTION INTRAMUSCULAR; INTRAVENOUS; SUBCUTANEOUS
Status: DISCONTINUED | OUTPATIENT
Start: 2023-01-01 | End: 2023-01-01 | Stop reason: HOSPADM

## 2023-01-01 RX ORDER — PIPERACILLIN SODIUM, TAZOBACTAM SODIUM 3; .375 G/15ML; G/15ML
3.38 INJECTION, POWDER, LYOPHILIZED, FOR SOLUTION INTRAVENOUS EVERY 8 HOURS
Status: DISPENSED | OUTPATIENT
Start: 2023-01-01 | End: 2023-01-01

## 2023-01-01 RX ORDER — METHOCARBAMOL 500 MG/1
500 TABLET, FILM COATED ORAL 4 TIMES DAILY PRN
Status: DISCONTINUED | OUTPATIENT
Start: 2023-01-01 | End: 2023-01-01 | Stop reason: HOSPADM

## 2023-01-01 RX ORDER — CALCIUM GLUCONATE 20 MG/ML
1 INJECTION, SOLUTION INTRAVENOUS ONCE
Status: COMPLETED | OUTPATIENT
Start: 2023-01-01 | End: 2023-01-01

## 2023-01-01 RX ORDER — OLOPATADINE HYDROCHLORIDE 2 MG/ML
1 SOLUTION/ DROPS OPHTHALMIC DAILY
Status: DISCONTINUED | OUTPATIENT
Start: 2023-01-01 | End: 2023-01-01 | Stop reason: CLARIF

## 2023-01-01 RX ORDER — ENOXAPARIN SODIUM 100 MG/ML
40 INJECTION SUBCUTANEOUS EVERY 24 HOURS
Status: DISCONTINUED | OUTPATIENT
Start: 2023-01-01 | End: 2023-01-01 | Stop reason: HOSPADM

## 2023-01-01 RX ORDER — ACETAMINOPHEN 325 MG/10.15ML
650 LIQUID ORAL EVERY 6 HOURS PRN
Status: DISCONTINUED | OUTPATIENT
Start: 2023-01-01 | End: 2023-01-01 | Stop reason: HOSPADM

## 2023-01-01 RX ORDER — ASPIRIN 81 MG/1
81 TABLET, CHEWABLE ORAL DAILY
Status: DISCONTINUED | OUTPATIENT
Start: 2023-01-01 | End: 2023-01-01

## 2023-01-01 RX ORDER — NICOTINE POLACRILEX 4 MG
15-30 LOZENGE BUCCAL
Status: DISCONTINUED | OUTPATIENT
Start: 2023-01-01 | End: 2023-01-01 | Stop reason: HOSPADM

## 2023-01-01 RX ORDER — OXYCODONE HCL 5 MG/5 ML
2.5 SOLUTION, ORAL ORAL EVERY 4 HOURS PRN
Status: DISCONTINUED | OUTPATIENT
Start: 2023-01-01 | End: 2023-01-01

## 2023-01-01 RX ORDER — SODIUM CHLORIDE 9 MG/ML
INJECTION, SOLUTION INTRAVENOUS CONTINUOUS
Status: ACTIVE | OUTPATIENT
Start: 2023-01-01 | End: 2023-01-01

## 2023-01-01 RX ORDER — IOPAMIDOL 755 MG/ML
500 INJECTION, SOLUTION INTRAVASCULAR ONCE
Status: COMPLETED | OUTPATIENT
Start: 2023-01-01 | End: 2023-01-01

## 2023-01-01 RX ORDER — INSULIN LISPRO 100 [IU]/ML
INJECTION, SOLUTION INTRAVENOUS; SUBCUTANEOUS AT BEDTIME
Status: ON HOLD | COMMUNITY
End: 2023-01-01

## 2023-01-01 RX ORDER — METOPROLOL TARTRATE 25 MG/1
100 TABLET, FILM COATED ORAL 2 TIMES DAILY
Status: DISCONTINUED | OUTPATIENT
Start: 2023-01-01 | End: 2023-01-01 | Stop reason: HOSPADM

## 2023-01-01 RX ORDER — TRAZODONE HYDROCHLORIDE 50 MG/1
50 TABLET, FILM COATED ORAL AT BEDTIME
Refills: 0 | Status: ON HOLD
Start: 2023-01-01 | End: 2023-01-01

## 2023-01-01 RX ORDER — RISPERIDONE 0.5 MG/1
0.5 TABLET ORAL 2 TIMES DAILY
Status: DISCONTINUED | OUTPATIENT
Start: 2023-01-01 | End: 2023-01-01 | Stop reason: HOSPADM

## 2023-01-01 RX ORDER — MIRTAZAPINE 15 MG/1
15 TABLET, FILM COATED ORAL AT BEDTIME
Start: 2023-01-01

## 2023-01-01 RX ORDER — PANTOPRAZOLE SODIUM 40 MG/1
40 TABLET, DELAYED RELEASE ORAL
Status: DISCONTINUED | OUTPATIENT
Start: 2023-01-01 | End: 2023-01-01

## 2023-01-01 RX ORDER — FENTANYL CITRATE 50 UG/ML
25-50 INJECTION, SOLUTION INTRAMUSCULAR; INTRAVENOUS
Status: DISCONTINUED | OUTPATIENT
Start: 2023-01-01 | End: 2023-01-01 | Stop reason: HOSPADM

## 2023-01-01 RX ORDER — TRAZODONE HYDROCHLORIDE 50 MG/1
50 TABLET, FILM COATED ORAL AT BEDTIME
Status: DISCONTINUED | OUTPATIENT
Start: 2023-01-01 | End: 2023-01-01 | Stop reason: HOSPADM

## 2023-01-01 RX ORDER — ACETAMINOPHEN 325 MG/1
975 TABLET ORAL ONCE
Status: DISCONTINUED | OUTPATIENT
Start: 2023-01-01 | End: 2023-01-01 | Stop reason: HOSPADM

## 2023-01-01 RX ORDER — DORZOLAMIDE HYDROCHLORIDE AND TIMOLOL MALEATE 20; 5 MG/ML; MG/ML
1 SOLUTION/ DROPS OPHTHALMIC 2 TIMES DAILY
Status: DISCONTINUED | OUTPATIENT
Start: 2023-01-01 | End: 2023-01-01 | Stop reason: HOSPADM

## 2023-01-01 RX ORDER — MAGNESIUM SULFATE 4 G/50ML
4 INJECTION INTRAVENOUS ONCE
Status: COMPLETED | OUTPATIENT
Start: 2023-01-01 | End: 2023-01-01

## 2023-01-01 RX ORDER — METOPROLOL TARTRATE 25 MG/1
50 TABLET, FILM COATED ORAL 2 TIMES DAILY
Status: DISCONTINUED | OUTPATIENT
Start: 2023-01-01 | End: 2023-01-01 | Stop reason: HOSPADM

## 2023-01-01 RX ORDER — LIDOCAINE 40 MG/G
CREAM TOPICAL
Status: ACTIVE | OUTPATIENT
Start: 2023-01-01 | End: 2023-01-01

## 2023-01-01 RX ORDER — METFORMIN HYDROCHLORIDE 750 MG/1
750 TABLET, EXTENDED RELEASE ORAL 2 TIMES DAILY WITH MEALS
Status: ON HOLD
Start: 2023-01-01 | End: 2023-01-01

## 2023-01-01 RX ORDER — NICOTINE POLACRILEX 4 MG
15-30 LOZENGE BUCCAL
Status: DISCONTINUED | OUTPATIENT
Start: 2023-01-01 | End: 2023-01-01

## 2023-01-01 RX ORDER — LIRAGLUTIDE 6 MG/ML
1.2 INJECTION SUBCUTANEOUS DAILY
Status: DISCONTINUED | OUTPATIENT
Start: 2023-01-01 | End: 2023-01-01 | Stop reason: HOSPADM

## 2023-01-01 RX ORDER — TRAMADOL HYDROCHLORIDE 50 MG/1
TABLET ORAL
Qty: 30 TABLET | Refills: 0 | Status: ON HOLD | OUTPATIENT
Start: 2023-01-01 | End: 2023-01-01

## 2023-01-01 RX ORDER — ACETAMINOPHEN 650 MG/1
650 SUPPOSITORY RECTAL EVERY 6 HOURS PRN
Status: DISCONTINUED | OUTPATIENT
Start: 2023-01-01 | End: 2023-01-01 | Stop reason: HOSPADM

## 2023-01-01 RX ORDER — METOPROLOL TARTRATE 50 MG
50 TABLET ORAL 2 TIMES DAILY
Start: 2023-01-01 | End: 2023-01-01

## 2023-01-01 RX ORDER — MAGNESIUM SULFATE HEPTAHYDRATE 40 MG/ML
2 INJECTION, SOLUTION INTRAVENOUS ONCE
Status: COMPLETED | OUTPATIENT
Start: 2023-01-01 | End: 2023-01-01

## 2023-01-01 RX ORDER — METOPROLOL TARTRATE 25 MG/1
50 TABLET, FILM COATED ORAL 2 TIMES DAILY
Status: DISCONTINUED | OUTPATIENT
Start: 2023-01-01 | End: 2023-01-01

## 2023-01-01 RX ORDER — POLYETHYLENE GLYCOL 3350 17 G/17G
17 POWDER, FOR SOLUTION ORAL DAILY PRN
Status: DISCONTINUED | OUTPATIENT
Start: 2023-01-01 | End: 2023-01-01 | Stop reason: HOSPADM

## 2023-01-01 RX ORDER — AMOXICILLIN 250 MG
1 CAPSULE ORAL 2 TIMES DAILY
Status: DISCONTINUED | OUTPATIENT
Start: 2023-01-01 | End: 2023-01-01 | Stop reason: HOSPADM

## 2023-01-01 RX ORDER — ONDANSETRON 2 MG/ML
4 INJECTION INTRAMUSCULAR; INTRAVENOUS EVERY 6 HOURS PRN
Status: DISCONTINUED | OUTPATIENT
Start: 2023-01-01 | End: 2023-01-01 | Stop reason: HOSPADM

## 2023-01-01 RX ORDER — TRAMADOL HYDROCHLORIDE 50 MG/1
25 TABLET ORAL 2 TIMES DAILY PRN
Status: ON HOLD | COMMUNITY
End: 2023-01-01 | Stop reason: HOSPADM

## 2023-01-01 RX ORDER — SIMETHICONE 80 MG
80 TABLET,CHEWABLE ORAL 4 TIMES DAILY PRN
Status: DISCONTINUED | OUTPATIENT
Start: 2023-01-01 | End: 2023-01-01 | Stop reason: HOSPADM

## 2023-01-01 RX ORDER — MORPHINE SULFATE 2 MG/ML
1 INJECTION, SOLUTION INTRAMUSCULAR; INTRAVENOUS ONCE
Status: COMPLETED | OUTPATIENT
Start: 2023-01-01 | End: 2023-01-01

## 2023-01-01 RX ORDER — FENTANYL CITRATE 50 UG/ML
25 INJECTION, SOLUTION INTRAMUSCULAR; INTRAVENOUS EVERY 5 MIN PRN
Status: DISCONTINUED | OUTPATIENT
Start: 2023-01-01 | End: 2023-01-01 | Stop reason: HOSPADM

## 2023-01-01 RX ORDER — DEXTROSE MONOHYDRATE 25 G/50ML
25-50 INJECTION, SOLUTION INTRAVENOUS
Status: DISCONTINUED | OUTPATIENT
Start: 2023-01-01 | End: 2023-01-01

## 2023-01-01 RX ORDER — POTASSIUM CHLORIDE 7.45 MG/ML
10 INJECTION INTRAVENOUS
Status: DISCONTINUED | OUTPATIENT
Start: 2023-01-01 | End: 2023-01-01

## 2023-01-01 RX ORDER — ENOXAPARIN SODIUM 100 MG/ML
30 INJECTION SUBCUTANEOUS EVERY 24 HOURS
Status: DISCONTINUED | OUTPATIENT
Start: 2023-01-01 | End: 2023-01-01 | Stop reason: HOSPADM

## 2023-01-01 RX ORDER — PIPERACILLIN SODIUM, TAZOBACTAM SODIUM 3; .375 G/15ML; G/15ML
3.38 INJECTION, POWDER, LYOPHILIZED, FOR SOLUTION INTRAVENOUS ONCE
Status: COMPLETED | OUTPATIENT
Start: 2023-01-01 | End: 2023-01-01

## 2023-01-01 RX ORDER — POTASSIUM CHLORIDE 1500 MG/1
20 TABLET, EXTENDED RELEASE ORAL ONCE
Status: COMPLETED | OUTPATIENT
Start: 2023-01-01 | End: 2023-01-01

## 2023-01-01 RX ORDER — SODIUM CHLORIDE 9 MG/ML
INJECTION, SOLUTION INTRAVENOUS CONTINUOUS
Status: DISCONTINUED | OUTPATIENT
Start: 2023-01-01 | End: 2023-01-01

## 2023-01-01 RX ORDER — NALOXONE HYDROCHLORIDE 0.4 MG/ML
0.2 INJECTION, SOLUTION INTRAMUSCULAR; INTRAVENOUS; SUBCUTANEOUS
Status: DISCONTINUED | OUTPATIENT
Start: 2023-01-01 | End: 2023-01-01 | Stop reason: HOSPADM

## 2023-01-01 RX ORDER — ATORVASTATIN CALCIUM 40 MG/1
80 TABLET, FILM COATED ORAL DAILY
Status: DISCONTINUED | OUTPATIENT
Start: 2023-01-01 | End: 2023-01-01

## 2023-01-01 RX ORDER — METFORMIN HCL 500 MG
500 TABLET, EXTENDED RELEASE 24 HR ORAL
Status: DISCONTINUED | OUTPATIENT
Start: 2023-01-01 | End: 2023-01-01

## 2023-01-01 RX ORDER — DULOXETIN HYDROCHLORIDE 30 MG/1
90 CAPSULE, DELAYED RELEASE ORAL EVERY MORNING
Start: 2023-01-01

## 2023-01-01 RX ORDER — OLOPATADINE HYDROCHLORIDE 1 MG/ML
SOLUTION/ DROPS OPHTHALMIC
Status: ON HOLD
Start: 2023-01-01 | End: 2023-01-01

## 2023-01-01 RX ORDER — CLINDAMYCIN PHOSPHATE 900 MG/50ML
900 INJECTION, SOLUTION INTRAVENOUS ONCE
Status: COMPLETED | OUTPATIENT
Start: 2023-01-01 | End: 2023-01-01

## 2023-01-01 RX ORDER — DIPHENHYDRAMINE HCL 25 MG
25 CAPSULE ORAL EVERY 6 HOURS PRN
Start: 2023-01-01

## 2023-01-01 RX ORDER — DEXTROSE MONOHYDRATE, SODIUM CHLORIDE, SODIUM LACTATE, POTASSIUM CHLORIDE, CALCIUM CHLORIDE 5; 600; 310; 179; 20 G/100ML; MG/100ML; MG/100ML; MG/100ML; MG/100ML
INJECTION, SOLUTION INTRAVENOUS CONTINUOUS
Status: DISCONTINUED | OUTPATIENT
Start: 2023-01-01 | End: 2023-01-01

## 2023-01-01 RX ORDER — ONDANSETRON 4 MG/1
4 TABLET, ORALLY DISINTEGRATING ORAL EVERY 6 HOURS PRN
Status: DISCONTINUED | OUTPATIENT
Start: 2023-01-01 | End: 2023-01-01 | Stop reason: HOSPADM

## 2023-01-01 RX ORDER — VANCOMYCIN HYDROCHLORIDE 1 G/200ML
1000 INJECTION, SOLUTION INTRAVENOUS EVERY 12 HOURS
Status: DISCONTINUED | OUTPATIENT
Start: 2023-01-01 | End: 2023-01-01

## 2023-01-01 RX ORDER — LIDOCAINE 40 MG/G
CREAM TOPICAL
Status: DISCONTINUED | OUTPATIENT
Start: 2023-01-01 | End: 2023-01-01 | Stop reason: HOSPADM

## 2023-01-01 RX ORDER — SIMETHICONE 80 MG
80 TABLET,CHEWABLE ORAL 4 TIMES DAILY PRN
Start: 2023-01-01

## 2023-01-01 RX ORDER — TRAZODONE HYDROCHLORIDE 50 MG/1
50 TABLET, FILM COATED ORAL AT BEDTIME
Status: DISCONTINUED | OUTPATIENT
Start: 2023-01-01 | End: 2023-01-01

## 2023-01-01 RX ORDER — OLOPATADINE HYDROCHLORIDE 1 MG/ML
1 SOLUTION/ DROPS OPHTHALMIC DAILY
Status: DISCONTINUED | OUTPATIENT
Start: 2023-01-01 | End: 2023-01-01 | Stop reason: HOSPADM

## 2023-01-01 RX ORDER — NOREPINEPHRINE BITARTRATE 0.06 MG/ML
.01-.6 INJECTION, SOLUTION INTRAVENOUS CONTINUOUS
Status: DISCONTINUED | OUTPATIENT
Start: 2023-01-01 | End: 2023-01-01 | Stop reason: HOSPADM

## 2023-01-01 RX ORDER — GUAIFENESIN 600 MG/1
15 TABLET, EXTENDED RELEASE ORAL DAILY
Status: DISCONTINUED | OUTPATIENT
Start: 2023-01-01 | End: 2023-01-01

## 2023-01-01 RX ORDER — MAGNESIUM SULFATE 4 G/50ML
4 INJECTION INTRAVENOUS ONCE
Status: DISCONTINUED | OUTPATIENT
Start: 2023-01-01 | End: 2023-01-01 | Stop reason: HOSPADM

## 2023-01-01 RX ORDER — MULTIPLE VITAMINS W/ MINERALS TAB 9MG-400MCG
1 TAB ORAL DAILY
Status: DISCONTINUED | OUTPATIENT
Start: 2023-01-01 | End: 2023-01-01 | Stop reason: HOSPADM

## 2023-01-01 RX ORDER — LANOLIN ALCOHOL/MO/W.PET/CERES
3 CREAM (GRAM) TOPICAL AT BEDTIME
COMMUNITY

## 2023-01-01 RX ORDER — DIPHENHYDRAMINE HCL 25 MG
25 CAPSULE ORAL EVERY 6 HOURS PRN
Status: DISCONTINUED | OUTPATIENT
Start: 2023-01-01 | End: 2023-01-01 | Stop reason: HOSPADM

## 2023-01-01 RX ORDER — ACETAMINOPHEN 325 MG/1
650 TABLET ORAL EVERY 6 HOURS PRN
Status: DISCONTINUED | OUTPATIENT
Start: 2023-01-01 | End: 2023-01-01 | Stop reason: HOSPADM

## 2023-01-01 RX ORDER — SODIUM CHLORIDE 9 MG/ML
INJECTION, SOLUTION INTRAVENOUS CONTINUOUS
Status: DISCONTINUED | OUTPATIENT
Start: 2023-01-01 | End: 2023-01-01 | Stop reason: ALTCHOICE

## 2023-01-01 RX ORDER — PANTOPRAZOLE SODIUM 40 MG/1
40 TABLET, DELAYED RELEASE ORAL
Status: DISCONTINUED | OUTPATIENT
Start: 2023-01-01 | End: 2023-01-01 | Stop reason: HOSPADM

## 2023-01-01 RX ORDER — TRAMADOL HYDROCHLORIDE 50 MG/1
25 TABLET ORAL 2 TIMES DAILY
Status: ON HOLD | COMMUNITY
End: 2023-01-01 | Stop reason: HOSPADM

## 2023-01-01 RX ORDER — ATORVASTATIN CALCIUM 80 MG/1
80 TABLET, FILM COATED ORAL EVERY EVENING
Start: 2023-01-01

## 2023-01-01 RX ORDER — DEXTROSE MONOHYDRATE 100 MG/ML
INJECTION, SOLUTION INTRAVENOUS CONTINUOUS
Status: DISCONTINUED | OUTPATIENT
Start: 2023-01-01 | End: 2023-01-01

## 2023-01-01 RX ORDER — PROPOFOL 10 MG/ML
INJECTION, EMULSION INTRAVENOUS PRN
Status: DISCONTINUED | OUTPATIENT
Start: 2023-01-01 | End: 2023-01-01

## 2023-01-01 RX ORDER — ATORVASTATIN CALCIUM 40 MG/1
80 TABLET, FILM COATED ORAL EVERY EVENING
Status: DISCONTINUED | OUTPATIENT
Start: 2023-01-01 | End: 2023-01-01 | Stop reason: HOSPADM

## 2023-01-01 RX ORDER — POTASSIUM CHLORIDE 1500 MG/1
40 TABLET, EXTENDED RELEASE ORAL ONCE
Status: COMPLETED | OUTPATIENT
Start: 2023-01-01 | End: 2023-01-01

## 2023-01-01 RX ORDER — DIPHENHYDRAMINE HYDROCHLORIDE 50 MG/ML
25 INJECTION INTRAMUSCULAR; INTRAVENOUS EVERY 6 HOURS PRN
Status: DISCONTINUED | OUTPATIENT
Start: 2023-01-01 | End: 2023-01-01 | Stop reason: HOSPADM

## 2023-01-01 RX ORDER — POTASSIUM CHLORIDE 1.5 G/1.58G
20 POWDER, FOR SOLUTION ORAL ONCE
Status: COMPLETED | OUTPATIENT
Start: 2023-01-01 | End: 2023-01-01

## 2023-01-01 RX ORDER — MIRTAZAPINE 15 MG/1
15 TABLET, FILM COATED ORAL AT BEDTIME
Status: DISCONTINUED | OUTPATIENT
Start: 2023-01-01 | End: 2023-01-01

## 2023-01-01 RX ORDER — ATORVASTATIN CALCIUM 40 MG/1
80 TABLET, FILM COATED ORAL EVERY EVENING
Status: DISCONTINUED | OUTPATIENT
Start: 2023-01-01 | End: 2023-01-01

## 2023-01-01 RX ORDER — OXYCODONE HYDROCHLORIDE 5 MG/1
5 TABLET ORAL EVERY 6 HOURS PRN
COMMUNITY

## 2023-01-01 RX ORDER — BISACODYL 10 MG
10 SUPPOSITORY, RECTAL RECTAL DAILY PRN
Status: DISCONTINUED | OUTPATIENT
Start: 2023-01-01 | End: 2023-01-01 | Stop reason: HOSPADM

## 2023-01-01 RX ORDER — CEFAZOLIN SODIUM 1 G/50ML
1250 SOLUTION INTRAVENOUS ONCE
Status: COMPLETED | OUTPATIENT
Start: 2023-01-01 | End: 2023-01-01

## 2023-01-01 RX ORDER — FOLIC ACID 1 MG/1
1 TABLET ORAL EVERY MORNING
Status: DISCONTINUED | OUTPATIENT
Start: 2023-01-01 | End: 2023-01-01

## 2023-01-01 RX ORDER — MIRTAZAPINE 15 MG/1
15 TABLET, FILM COATED ORAL AT BEDTIME
Status: DISCONTINUED | OUTPATIENT
Start: 2023-01-01 | End: 2023-01-01 | Stop reason: HOSPADM

## 2023-01-01 RX ORDER — OXYCODONE HYDROCHLORIDE 5 MG/1
TABLET ORAL
Qty: 20 TABLET | Refills: 0 | Status: SHIPPED | OUTPATIENT
Start: 2023-01-01 | End: 2023-01-01

## 2023-01-01 RX ORDER — MAGNESIUM OXIDE 400 MG/1
400 TABLET ORAL 2 TIMES DAILY
COMMUNITY
Start: 2023-01-01 | End: 2023-01-01

## 2023-01-01 RX ORDER — METFORMIN HCL 500 MG
500 TABLET, EXTENDED RELEASE 24 HR ORAL 2 TIMES DAILY WITH MEALS
Status: DISCONTINUED | OUTPATIENT
Start: 2023-01-01 | End: 2023-01-01

## 2023-01-01 RX ORDER — HYDROCORTISONE 25 MG/G
CREAM TOPICAL 2 TIMES DAILY PRN
Status: DISCONTINUED | OUTPATIENT
Start: 2023-01-01 | End: 2023-01-01 | Stop reason: HOSPADM

## 2023-01-01 RX ORDER — NITROGLYCERIN 0.4 MG/1
0.4 TABLET SUBLINGUAL EVERY 5 MIN PRN
Status: DISCONTINUED | OUTPATIENT
Start: 2023-01-01 | End: 2023-01-01 | Stop reason: HOSPADM

## 2023-01-01 RX ORDER — LIDOCAINE HYDROCHLORIDE 10 MG/ML
INJECTION, SOLUTION INFILTRATION; PERINEURAL PRN
Status: DISCONTINUED | OUTPATIENT
Start: 2023-01-01 | End: 2023-01-01

## 2023-01-01 RX ORDER — PROPOFOL 10 MG/ML
INJECTION, EMULSION INTRAVENOUS
Status: COMPLETED
Start: 2023-01-01 | End: 2023-01-01

## 2023-01-01 RX ORDER — HYDRALAZINE HYDROCHLORIDE 20 MG/ML
5 INJECTION INTRAMUSCULAR; INTRAVENOUS EVERY 4 HOURS PRN
Status: DISCONTINUED | OUTPATIENT
Start: 2023-01-01 | End: 2023-01-01 | Stop reason: HOSPADM

## 2023-01-01 RX ORDER — POTASSIUM CHLORIDE 1.5 G/1.58G
40 POWDER, FOR SOLUTION ORAL ONCE
Status: COMPLETED | OUTPATIENT
Start: 2023-01-01 | End: 2023-01-01

## 2023-01-01 RX ORDER — LORATADINE 10 MG/1
10 TABLET ORAL DAILY PRN
Status: DISCONTINUED | OUTPATIENT
Start: 2023-01-01 | End: 2023-01-01 | Stop reason: HOSPADM

## 2023-01-01 RX ORDER — ENOXAPARIN SODIUM 100 MG/ML
40 INJECTION SUBCUTANEOUS EVERY 24 HOURS
Status: DISCONTINUED | OUTPATIENT
Start: 2023-01-01 | End: 2023-01-01

## 2023-01-01 RX ORDER — OLOPATADINE HYDROCHLORIDE 1 MG/ML
1 SOLUTION/ DROPS OPHTHALMIC 2 TIMES DAILY
Status: DISCONTINUED | OUTPATIENT
Start: 2023-01-01 | End: 2023-01-01 | Stop reason: HOSPADM

## 2023-01-01 RX ORDER — NICOTINE POLACRILEX 4 MG
15-30 LOZENGE BUCCAL
Status: DISCONTINUED | OUTPATIENT
Start: 2023-01-01 | End: 2023-01-01 | Stop reason: ALTCHOICE

## 2023-01-01 RX ORDER — ACETAMINOPHEN 325 MG/10.15ML
650 LIQUID ORAL EVERY 6 HOURS PRN
Start: 2023-01-01 | End: 2023-01-01 | Stop reason: DRUGHIGH

## 2023-01-01 RX ORDER — ARGININE/GLUTAMINE/CALCIUM BMB 7G-7G-1.5G
1 POWDER IN PACKET (EA) ORAL 2 TIMES DAILY WITH MEALS
Status: DISCONTINUED | OUTPATIENT
Start: 2023-01-01 | End: 2023-01-01 | Stop reason: HOSPADM

## 2023-01-01 RX ORDER — AMOXICILLIN 250 MG
1 CAPSULE ORAL 2 TIMES DAILY PRN
Status: DISCONTINUED | OUTPATIENT
Start: 2023-01-01 | End: 2023-01-01 | Stop reason: HOSPADM

## 2023-01-01 RX ORDER — AMOXICILLIN 250 MG
2 CAPSULE ORAL 2 TIMES DAILY PRN
Status: DISCONTINUED | OUTPATIENT
Start: 2023-01-01 | End: 2023-01-01 | Stop reason: HOSPADM

## 2023-01-01 RX ORDER — POTASSIUM CHLORIDE 7.45 MG/ML
10 INJECTION INTRAVENOUS
Status: DISPENSED | OUTPATIENT
Start: 2023-01-01 | End: 2023-01-01

## 2023-01-01 RX ORDER — LIDOCAINE 50 MG/G
OINTMENT TOPICAL EVERY 4 HOURS PRN
Status: DISCONTINUED | OUTPATIENT
Start: 2023-01-01 | End: 2023-01-01 | Stop reason: HOSPADM

## 2023-01-01 RX ORDER — PROPOFOL 10 MG/ML
10-80 INJECTION, EMULSION INTRAVENOUS CONTINUOUS
Status: DISCONTINUED | OUTPATIENT
Start: 2023-01-01 | End: 2023-01-01

## 2023-01-01 RX ORDER — METFORMIN HCL 500 MG
500 TABLET, EXTENDED RELEASE 24 HR ORAL
Qty: 180 TABLET | Refills: 3 | Status: ON HOLD
Start: 2023-01-01 | End: 2023-01-01

## 2023-01-01 RX ORDER — MULTIPLE VITAMINS W/ MINERALS TAB 9MG-400MCG
1 TAB ORAL DAILY
Start: 2023-01-01

## 2023-01-01 RX ORDER — AMOXICILLIN 250 MG
2 CAPSULE ORAL 2 TIMES DAILY
Status: DISCONTINUED | OUTPATIENT
Start: 2023-01-01 | End: 2023-01-01 | Stop reason: HOSPADM

## 2023-01-01 RX ORDER — IOPAMIDOL 755 MG/ML
100 INJECTION, SOLUTION INTRAVASCULAR ONCE
Status: COMPLETED | OUTPATIENT
Start: 2023-01-01 | End: 2023-01-01

## 2023-01-01 RX ORDER — ONDANSETRON 2 MG/ML
4 INJECTION INTRAMUSCULAR; INTRAVENOUS EVERY 30 MIN PRN
Status: DISCONTINUED | OUTPATIENT
Start: 2023-01-01 | End: 2023-01-01 | Stop reason: HOSPADM

## 2023-01-01 RX ORDER — DIPHENHYDRAMINE HCL 25 MG
25 CAPSULE ORAL
Status: COMPLETED | OUTPATIENT
Start: 2023-01-01 | End: 2023-01-01

## 2023-01-01 RX ORDER — POTASSIUM CHLORIDE 20MEQ/15ML
20 LIQUID (ML) ORAL ONCE
Status: COMPLETED | OUTPATIENT
Start: 2023-01-01 | End: 2023-01-01

## 2023-01-01 RX ORDER — ENOXAPARIN SODIUM 100 MG/ML
30 INJECTION SUBCUTANEOUS EVERY 24 HOURS
Status: ON HOLD
Start: 2023-01-01 | End: 2023-01-01

## 2023-01-01 RX ORDER — SODIUM CHLORIDE, SODIUM LACTATE, POTASSIUM CHLORIDE, CALCIUM CHLORIDE 600; 310; 30; 20 MG/100ML; MG/100ML; MG/100ML; MG/100ML
INJECTION, SOLUTION INTRAVENOUS ONCE
Status: COMPLETED | OUTPATIENT
Start: 2023-01-01 | End: 2023-01-01

## 2023-01-01 RX ORDER — ONDANSETRON 4 MG/1
4 TABLET, ORALLY DISINTEGRATING ORAL EVERY 30 MIN PRN
Status: DISCONTINUED | OUTPATIENT
Start: 2023-01-01 | End: 2023-01-01 | Stop reason: HOSPADM

## 2023-01-01 RX ORDER — OXYCODONE HYDROCHLORIDE 5 MG/1
TABLET ORAL
Qty: 12 TABLET | Refills: 0 | Status: SHIPPED | OUTPATIENT
Start: 2023-01-01 | End: 2023-01-01

## 2023-01-01 RX ORDER — PANTOPRAZOLE SODIUM 40 MG/1
40 TABLET, DELAYED RELEASE ORAL
Start: 2023-01-01

## 2023-01-01 RX ORDER — CARBOXYMETHYLCELLULOSE SODIUM 5 MG/ML
1 SOLUTION/ DROPS OPHTHALMIC 3 TIMES DAILY
Status: DISCONTINUED | OUTPATIENT
Start: 2023-01-01 | End: 2023-01-01 | Stop reason: HOSPADM

## 2023-01-01 RX ORDER — SODIUM CHLORIDE, SODIUM LACTATE, POTASSIUM CHLORIDE, CALCIUM CHLORIDE 600; 310; 30; 20 MG/100ML; MG/100ML; MG/100ML; MG/100ML
INJECTION, SOLUTION INTRAVENOUS CONTINUOUS
Status: DISCONTINUED | OUTPATIENT
Start: 2023-01-01 | End: 2023-01-01 | Stop reason: HOSPADM

## 2023-01-01 RX ORDER — NOREPINEPHRINE BITARTRATE 0.02 MG/ML
.01-.6 INJECTION, SOLUTION INTRAVENOUS CONTINUOUS
Status: DISCONTINUED | OUTPATIENT
Start: 2023-01-01 | End: 2023-01-01

## 2023-01-01 RX ORDER — ATORVASTATIN CALCIUM 40 MG/1
80 TABLET, FILM COATED ORAL DAILY
Status: DISCONTINUED | OUTPATIENT
Start: 2023-01-01 | End: 2023-01-01 | Stop reason: HOSPADM

## 2023-01-01 RX ORDER — INSULIN GLARGINE 100 [IU]/ML
6 INJECTION, SOLUTION SUBCUTANEOUS 2 TIMES DAILY
COMMUNITY
End: 2023-01-01

## 2023-01-01 RX ORDER — ACETAMINOPHEN 325 MG/1
650 TABLET ORAL EVERY 6 HOURS PRN
Refills: 0 | Status: ON HOLD
Start: 2023-01-01 | End: 2023-01-01

## 2023-01-01 RX ORDER — PHENYLEPHRINE HCL IN 0.9% NACL 50MG/250ML
.1-.5 PLASTIC BAG, INJECTION (ML) INTRAVENOUS CONTINUOUS
Status: DISCONTINUED | OUTPATIENT
Start: 2023-01-01 | End: 2023-01-01 | Stop reason: HOSPADM

## 2023-01-01 RX ORDER — FENTANYL CITRATE 50 UG/ML
INJECTION, SOLUTION INTRAMUSCULAR; INTRAVENOUS PRN
Status: DISCONTINUED | OUTPATIENT
Start: 2023-01-01 | End: 2023-01-01

## 2023-01-01 RX ORDER — RISPERIDONE 0.5 MG/1
0.5 TABLET ORAL 2 TIMES DAILY
COMMUNITY
Start: 2023-01-01 | End: 2023-01-01

## 2023-01-01 RX ORDER — HYDROMORPHONE HCL IN WATER/PF 6 MG/30 ML
0.2 PATIENT CONTROLLED ANALGESIA SYRINGE INTRAVENOUS
Status: DISCONTINUED | OUTPATIENT
Start: 2023-01-01 | End: 2023-01-01 | Stop reason: HOSPADM

## 2023-01-01 RX ORDER — SIMETHICONE 80 MG
80 TABLET,CHEWABLE ORAL 4 TIMES DAILY PRN
Status: DISCONTINUED | OUTPATIENT
Start: 2023-01-01 | End: 2023-01-01

## 2023-01-01 RX ORDER — ARGININE/GLUTAMINE/CALCIUM BMB 7G-7G-1.5G
1 POWDER IN PACKET (EA) ORAL 2 TIMES DAILY WITH MEALS
Status: ON HOLD
Start: 2023-01-01 | End: 2023-01-01

## 2023-01-01 RX ORDER — METFORMIN HYDROCHLORIDE 750 MG/1
750 TABLET, EXTENDED RELEASE ORAL 2 TIMES DAILY WITH MEALS
Status: DISCONTINUED | OUTPATIENT
Start: 2023-01-01 | End: 2023-01-01 | Stop reason: HOSPADM

## 2023-01-01 RX ORDER — METHOCARBAMOL 500 MG/1
500 TABLET, FILM COATED ORAL 4 TIMES DAILY PRN
Start: 2023-01-01

## 2023-01-01 RX ORDER — OXYCODONE HYDROCHLORIDE 5 MG/1
5 TABLET ORAL EVERY MORNING
COMMUNITY

## 2023-01-01 RX ORDER — INSULIN GLARGINE 100 [IU]/ML
45 INJECTION, SOLUTION SUBCUTANEOUS AT BEDTIME
Status: ON HOLD | COMMUNITY
End: 2023-01-01

## 2023-01-01 RX ORDER — CEFAZOLIN SODIUM 1 G/50ML
1250 SOLUTION INTRAVENOUS EVERY 24 HOURS
Status: DISCONTINUED | OUTPATIENT
Start: 2023-01-01 | End: 2023-01-01

## 2023-01-01 RX ORDER — ASPIRIN 81 MG/1
81 TABLET, CHEWABLE ORAL DAILY
Start: 2023-01-01

## 2023-01-01 RX ORDER — DEXTROSE MONOHYDRATE 25 G/50ML
25-50 INJECTION, SOLUTION INTRAVENOUS
Status: DISCONTINUED | OUTPATIENT
Start: 2023-01-01 | End: 2023-01-01 | Stop reason: ALTCHOICE

## 2023-01-01 RX ORDER — SIMETHICONE 125 MG
125 TABLET,CHEWABLE ORAL 4 TIMES DAILY PRN
Status: DISCONTINUED | OUTPATIENT
Start: 2023-01-01 | End: 2023-01-01

## 2023-01-01 RX ORDER — CARBOXYMETHYLCELLULOSE SODIUM 5 MG/ML
1 SOLUTION/ DROPS OPHTHALMIC
Status: DISCONTINUED | OUTPATIENT
Start: 2023-01-01 | End: 2023-01-01

## 2023-01-01 RX ORDER — DEXAMETHASONE SODIUM PHOSPHATE 4 MG/ML
INJECTION, SOLUTION INTRA-ARTICULAR; INTRALESIONAL; INTRAMUSCULAR; INTRAVENOUS; SOFT TISSUE PRN
Status: DISCONTINUED | OUTPATIENT
Start: 2023-01-01 | End: 2023-01-01

## 2023-01-01 RX ORDER — HALOPERIDOL 5 MG/ML
2 INJECTION INTRAMUSCULAR EVERY 6 HOURS PRN
Status: DISCONTINUED | OUTPATIENT
Start: 2023-01-01 | End: 2023-01-01 | Stop reason: HOSPADM

## 2023-01-01 RX ORDER — ATORVASTATIN CALCIUM 10 MG/1
10 TABLET, FILM COATED ORAL EVERY EVENING
Status: DISCONTINUED | OUTPATIENT
Start: 2023-01-01 | End: 2023-01-01

## 2023-01-01 RX ORDER — CHLORTHALIDONE 25 MG/1
25 TABLET ORAL EVERY MORNING
Status: DISCONTINUED | OUTPATIENT
Start: 2023-01-01 | End: 2023-01-01 | Stop reason: HOSPADM

## 2023-01-01 RX ORDER — ACETAMINOPHEN 500 MG
1000 TABLET ORAL 3 TIMES DAILY
COMMUNITY

## 2023-01-01 RX ORDER — HALOPERIDOL 5 MG/ML
2 INJECTION INTRAMUSCULAR ONCE
Status: COMPLETED | OUTPATIENT
Start: 2023-01-01 | End: 2023-01-01

## 2023-01-01 RX ORDER — METOPROLOL TARTRATE 25 MG/1
12.5 TABLET, FILM COATED ORAL 2 TIMES DAILY
COMMUNITY

## 2023-01-01 RX ORDER — DEXAMETHASONE SODIUM PHOSPHATE 4 MG/ML
4 INJECTION, SOLUTION INTRA-ARTICULAR; INTRALESIONAL; INTRAMUSCULAR; INTRAVENOUS; SOFT TISSUE EVERY 6 HOURS
Status: DISCONTINUED | OUTPATIENT
Start: 2023-01-01 | End: 2023-01-01 | Stop reason: HOSPADM

## 2023-01-01 RX ORDER — TRAZODONE HYDROCHLORIDE 50 MG/1
50 TABLET, FILM COATED ORAL AT BEDTIME
Start: 2023-01-01

## 2023-01-01 RX ORDER — LIRAGLUTIDE 6 MG/ML
1.2 INJECTION SUBCUTANEOUS DAILY
Status: DISCONTINUED | OUTPATIENT
Start: 2023-01-01 | End: 2023-01-01

## 2023-01-01 RX ORDER — HYDROMORPHONE HCL IN WATER/PF 6 MG/30 ML
.2-.4 PATIENT CONTROLLED ANALGESIA SYRINGE INTRAVENOUS
Status: DISCONTINUED | OUTPATIENT
Start: 2023-01-01 | End: 2023-01-01

## 2023-01-01 RX ORDER — HYDROMORPHONE HYDROCHLORIDE 1 MG/ML
0.2 INJECTION, SOLUTION INTRAMUSCULAR; INTRAVENOUS; SUBCUTANEOUS EVERY 5 MIN PRN
Status: DISCONTINUED | OUTPATIENT
Start: 2023-01-01 | End: 2023-01-01 | Stop reason: HOSPADM

## 2023-01-01 RX ORDER — HYDROMORPHONE HYDROCHLORIDE 1 MG/ML
0.4 INJECTION, SOLUTION INTRAMUSCULAR; INTRAVENOUS; SUBCUTANEOUS EVERY 5 MIN PRN
Status: DISCONTINUED | OUTPATIENT
Start: 2023-01-01 | End: 2023-01-01 | Stop reason: HOSPADM

## 2023-01-01 RX ORDER — ONDANSETRON 2 MG/ML
INJECTION INTRAMUSCULAR; INTRAVENOUS PRN
Status: DISCONTINUED | OUTPATIENT
Start: 2023-01-01 | End: 2023-01-01

## 2023-01-01 RX ORDER — INSULIN LISPRO 100 [IU]/ML
INJECTION, SOLUTION INTRAVENOUS; SUBCUTANEOUS
Status: ON HOLD | COMMUNITY
End: 2023-01-01

## 2023-01-01 RX ADMIN — PANTOPRAZOLE SODIUM 40 MG: 40 TABLET, DELAYED RELEASE ORAL at 06:21

## 2023-01-01 RX ADMIN — POLYVINYL ALCOHOL 1 DROP: 14 SOLUTION/ DROPS OPHTHALMIC at 08:47

## 2023-01-01 RX ADMIN — PANTOPRAZOLE SODIUM 40 MG: 40 TABLET, DELAYED RELEASE ORAL at 17:19

## 2023-01-01 RX ADMIN — DULOXETINE HYDROCHLORIDE 90 MG: 30 CAPSULE, DELAYED RELEASE ORAL at 09:38

## 2023-01-01 RX ADMIN — DORZOLAMIDE HYDROCHLORIDE AND TIMOLOL MALEATE 1 DROP: 20; 5 SOLUTION/ DROPS OPHTHALMIC at 20:53

## 2023-01-01 RX ADMIN — INSULIN ASPART 1 UNITS: 100 INJECTION, SOLUTION INTRAVENOUS; SUBCUTANEOUS at 18:06

## 2023-01-01 RX ADMIN — PANTOPRAZOLE SODIUM 40 MG: 40 TABLET, DELAYED RELEASE ORAL at 18:23

## 2023-01-01 RX ADMIN — TRAZODONE HYDROCHLORIDE 50 MG: 50 TABLET ORAL at 21:53

## 2023-01-01 RX ADMIN — METOPROLOL TARTRATE 100 MG: 25 TABLET, FILM COATED ORAL at 07:08

## 2023-01-01 RX ADMIN — DICLOFENAC SODIUM 2 G: 10 GEL TOPICAL at 16:01

## 2023-01-01 RX ADMIN — INSULIN ASPART 1 UNITS: 100 INJECTION, SOLUTION INTRAVENOUS; SUBCUTANEOUS at 17:09

## 2023-01-01 RX ADMIN — MULTIPLE VITAMINS W/ MINERALS TAB 1 TABLET: TAB at 08:51

## 2023-01-01 RX ADMIN — PIPERACILLIN AND TAZOBACTAM 3.38 G: 3; .375 INJECTION, POWDER, LYOPHILIZED, FOR SOLUTION INTRAVENOUS at 04:57

## 2023-01-01 RX ADMIN — OLOPATADINE HYDROCHLORIDE 1 DROP: 1 SOLUTION OPHTHALMIC at 09:19

## 2023-01-01 RX ADMIN — ATORVASTATIN CALCIUM 80 MG: 40 TABLET, FILM COATED ORAL at 12:10

## 2023-01-01 RX ADMIN — INSULIN ASPART 1 UNITS: 100 INJECTION, SOLUTION INTRAVENOUS; SUBCUTANEOUS at 08:47

## 2023-01-01 RX ADMIN — SODIUM CHLORIDE: 9 INJECTION, SOLUTION INTRAVENOUS at 14:57

## 2023-01-01 RX ADMIN — MIRTAZAPINE 15 MG: 15 TABLET, FILM COATED ORAL at 22:13

## 2023-01-01 RX ADMIN — OXYCODONE HYDROCHLORIDE 2.5 MG: 5 TABLET ORAL at 10:12

## 2023-01-01 RX ADMIN — DICLOFENAC SODIUM 2 G: 10 GEL TOPICAL at 20:33

## 2023-01-01 RX ADMIN — OXYCODONE HYDROCHLORIDE 2.5 MG: 5 TABLET ORAL at 02:47

## 2023-01-01 RX ADMIN — ENOXAPARIN SODIUM 30 MG: 30 INJECTION SUBCUTANEOUS at 18:45

## 2023-01-01 RX ADMIN — METHOCARBAMOL 500 MG: 500 TABLET, FILM COATED ORAL at 10:12

## 2023-01-01 RX ADMIN — INSULIN ASPART 2 UNITS: 100 INJECTION, SOLUTION INTRAVENOUS; SUBCUTANEOUS at 04:18

## 2023-01-01 RX ADMIN — DICLOFENAC SODIUM 2 G: 10 GEL TOPICAL at 21:31

## 2023-01-01 RX ADMIN — DICLOFENAC SODIUM 2 G: 10 GEL TOPICAL at 14:03

## 2023-01-01 RX ADMIN — MIRTAZAPINE 15 MG: 15 TABLET, FILM COATED ORAL at 20:13

## 2023-01-01 RX ADMIN — INSULIN GLARGINE 10 UNITS: 100 INJECTION, SOLUTION SUBCUTANEOUS at 09:13

## 2023-01-01 RX ADMIN — Medication 40 MG: at 05:58

## 2023-01-01 RX ADMIN — DICLOFENAC SODIUM 2 G: 10 GEL TOPICAL at 08:16

## 2023-01-01 RX ADMIN — OLOPATADINE HYDROCHLORIDE 1 DROP: 1 SOLUTION OPHTHALMIC at 09:41

## 2023-01-01 RX ADMIN — DULOXETINE HYDROCHLORIDE 90 MG: 30 CAPSULE, DELAYED RELEASE ORAL at 09:37

## 2023-01-01 RX ADMIN — POTASSIUM CHLORIDE: 2 INJECTION, SOLUTION, CONCENTRATE INTRAVENOUS at 19:39

## 2023-01-01 RX ADMIN — PIPERACILLIN AND TAZOBACTAM 3.38 G: 3; .375 INJECTION, POWDER, LYOPHILIZED, FOR SOLUTION INTRAVENOUS at 21:09

## 2023-01-01 RX ADMIN — Medication 1 PACKET: at 16:15

## 2023-01-01 RX ADMIN — DICLOFENAC SODIUM 2 G: 10 GEL TOPICAL at 16:55

## 2023-01-01 RX ADMIN — PIPERACILLIN AND TAZOBACTAM 3.38 G: 3; .375 INJECTION, POWDER, LYOPHILIZED, FOR SOLUTION INTRAVENOUS at 12:51

## 2023-01-01 RX ADMIN — PANTOPRAZOLE SODIUM 40 MG: 40 TABLET, DELAYED RELEASE ORAL at 08:40

## 2023-01-01 RX ADMIN — Medication 0.38 MCG/KG/MIN: at 10:05

## 2023-01-01 RX ADMIN — PIPERACILLIN AND TAZOBACTAM 3.38 G: 3; .375 INJECTION, POWDER, LYOPHILIZED, FOR SOLUTION INTRAVENOUS at 21:46

## 2023-01-01 RX ADMIN — SODIUM PHOSPHATE, MONOBASIC, MONOHYDRATE AND SODIUM PHOSPHATE, DIBASIC, ANHYDROUS 15 MMOL: 142; 276 INJECTION, SOLUTION INTRAVENOUS at 13:05

## 2023-01-01 RX ADMIN — MIRTAZAPINE 15 MG: 15 TABLET, FILM COATED ORAL at 20:00

## 2023-01-01 RX ADMIN — ASPIRIN 81 MG: 81 TABLET, CHEWABLE ORAL at 09:55

## 2023-01-01 RX ADMIN — DEXTROSE MONOHYDRATE AND SODIUM CHLORIDE: 5; .45 INJECTION, SOLUTION INTRAVENOUS at 16:09

## 2023-01-01 RX ADMIN — FOLIC ACID 1 MG: 1 TABLET ORAL at 08:01

## 2023-01-01 RX ADMIN — ENOXAPARIN SODIUM 30 MG: 30 INJECTION SUBCUTANEOUS at 17:13

## 2023-01-01 RX ADMIN — CHLORTHALIDONE 25 MG: 25 TABLET ORAL at 12:10

## 2023-01-01 RX ADMIN — PANTOPRAZOLE SODIUM 40 MG: 40 INJECTION, POWDER, FOR SOLUTION INTRAVENOUS at 08:30

## 2023-01-01 RX ADMIN — INSULIN ASPART 3 UNITS: 100 INJECTION, SOLUTION INTRAVENOUS; SUBCUTANEOUS at 04:50

## 2023-01-01 RX ADMIN — Medication 40 MG: at 17:55

## 2023-01-01 RX ADMIN — ENOXAPARIN SODIUM 40 MG: 40 INJECTION SUBCUTANEOUS at 08:08

## 2023-01-01 RX ADMIN — OXYCODONE HYDROCHLORIDE 2.5 MG: 5 TABLET ORAL at 17:27

## 2023-01-01 RX ADMIN — Medication 0.52 MCG/KG/MIN: at 00:52

## 2023-01-01 RX ADMIN — RISPERIDONE 0.5 MG: 0.5 TABLET, FILM COATED ORAL at 20:32

## 2023-01-01 RX ADMIN — LATANOPROST 1 DROP: 50 SOLUTION/ DROPS OPHTHALMIC at 22:04

## 2023-01-01 RX ADMIN — METOPROLOL TARTRATE 50 MG: 25 TABLET, FILM COATED ORAL at 20:00

## 2023-01-01 RX ADMIN — TRAZODONE HYDROCHLORIDE 50 MG: 50 TABLET ORAL at 20:41

## 2023-01-01 RX ADMIN — PIPERACILLIN AND TAZOBACTAM 3.38 G: 3; .375 INJECTION, POWDER, LYOPHILIZED, FOR SOLUTION INTRAVENOUS at 13:35

## 2023-01-01 RX ADMIN — DICLOFENAC SODIUM 2 G: 10 GEL TOPICAL at 22:10

## 2023-01-01 RX ADMIN — INSULIN ASPART 1 UNITS: 100 INJECTION, SOLUTION INTRAVENOUS; SUBCUTANEOUS at 12:23

## 2023-01-01 RX ADMIN — DICLOFENAC SODIUM 2 G: 10 GEL TOPICAL at 17:14

## 2023-01-01 RX ADMIN — HYDROMORPHONE HYDROCHLORIDE 0.2 MG: 0.2 INJECTION, SOLUTION INTRAMUSCULAR; INTRAVENOUS; SUBCUTANEOUS at 09:51

## 2023-01-01 RX ADMIN — ENOXAPARIN SODIUM 30 MG: 30 INJECTION SUBCUTANEOUS at 18:13

## 2023-01-01 RX ADMIN — DULOXETINE HYDROCHLORIDE 90 MG: 60 CAPSULE, DELAYED RELEASE PELLETS ORAL at 09:07

## 2023-01-01 RX ADMIN — Medication 1 PACKET: at 09:26

## 2023-01-01 RX ADMIN — METOPROLOL TARTRATE 50 MG: 25 TABLET, FILM COATED ORAL at 08:50

## 2023-01-01 RX ADMIN — TRAZODONE HYDROCHLORIDE 50 MG: 50 TABLET ORAL at 21:18

## 2023-01-01 RX ADMIN — INSULIN ASPART 3 UNITS: 100 INJECTION, SOLUTION INTRAVENOUS; SUBCUTANEOUS at 12:12

## 2023-01-01 RX ADMIN — PIPERACILLIN AND TAZOBACTAM 3.38 G: 3; .375 INJECTION, POWDER, LYOPHILIZED, FOR SOLUTION INTRAVENOUS at 20:52

## 2023-01-01 RX ADMIN — DORZOLAMIDE HYDROCHLORIDE AND TIMOLOL MALEATE 1 DROP: 20; 5 SOLUTION/ DROPS OPHTHALMIC at 08:32

## 2023-01-01 RX ADMIN — PIPERACILLIN AND TAZOBACTAM 3.38 G: 3; .375 INJECTION, POWDER, LYOPHILIZED, FOR SOLUTION INTRAVENOUS at 21:14

## 2023-01-01 RX ADMIN — PIPERACILLIN AND TAZOBACTAM 3.38 G: 3; .375 INJECTION, POWDER, LYOPHILIZED, FOR SOLUTION INTRAVENOUS at 13:11

## 2023-01-01 RX ADMIN — DORZOLAMIDE HYDROCHLORIDE AND TIMOLOL MALEATE 1 DROP: 20; 5 SOLUTION/ DROPS OPHTHALMIC at 22:02

## 2023-01-01 RX ADMIN — OXYCODONE HYDROCHLORIDE 2.5 MG: 5 TABLET ORAL at 14:10

## 2023-01-01 RX ADMIN — POLYVINYL ALCOHOL 1 DROP: 14 SOLUTION/ DROPS OPHTHALMIC at 14:40

## 2023-01-01 RX ADMIN — OXYCODONE HYDROCHLORIDE 2.5 MG: 5 TABLET ORAL at 09:18

## 2023-01-01 RX ADMIN — OXYCODONE HYDROCHLORIDE 2.5 MG: 5 TABLET ORAL at 11:34

## 2023-01-01 RX ADMIN — DICLOFENAC SODIUM 2 G: 10 GEL TOPICAL at 12:36

## 2023-01-01 RX ADMIN — METHOCARBAMOL 500 MG: 500 TABLET, FILM COATED ORAL at 01:10

## 2023-01-01 RX ADMIN — PIPERACILLIN AND TAZOBACTAM 3.38 G: 3; .375 INJECTION, POWDER, LYOPHILIZED, FOR SOLUTION INTRAVENOUS at 20:24

## 2023-01-01 RX ADMIN — DEXTROSE MONOHYDRATE AND SODIUM CHLORIDE: 5; .45 INJECTION, SOLUTION INTRAVENOUS at 13:47

## 2023-01-01 RX ADMIN — METOPROLOL TARTRATE 50 MG: 25 TABLET, FILM COATED ORAL at 08:14

## 2023-01-01 RX ADMIN — PANTOPRAZOLE SODIUM 40 MG: 40 TABLET, DELAYED RELEASE ORAL at 12:26

## 2023-01-01 RX ADMIN — METOPROLOL TARTRATE 100 MG: 25 TABLET, FILM COATED ORAL at 20:28

## 2023-01-01 RX ADMIN — Medication 40 MG: at 06:57

## 2023-01-01 RX ADMIN — POLYVINYL ALCOHOL 1 DROP: 14 SOLUTION/ DROPS OPHTHALMIC at 13:24

## 2023-01-01 RX ADMIN — DICLOFENAC SODIUM 2 G: 10 GEL TOPICAL at 18:23

## 2023-01-01 RX ADMIN — POLYVINYL ALCOHOL 1 DROP: 14 SOLUTION/ DROPS OPHTHALMIC at 08:31

## 2023-01-01 RX ADMIN — MAGNESIUM SULFATE HEPTAHYDRATE 4 G: 80 INJECTION, SOLUTION INTRAVENOUS at 15:06

## 2023-01-01 RX ADMIN — ASPIRIN 81 MG: 81 TABLET, CHEWABLE ORAL at 08:45

## 2023-01-01 RX ADMIN — ATORVASTATIN CALCIUM 80 MG: 40 TABLET, FILM COATED ORAL at 08:05

## 2023-01-01 RX ADMIN — DICLOFENAC SODIUM 2 G: 10 GEL TOPICAL at 12:43

## 2023-01-01 RX ADMIN — ASPIRIN 81 MG: 81 TABLET, CHEWABLE ORAL at 09:04

## 2023-01-01 RX ADMIN — OXYCODONE HYDROCHLORIDE 2.5 MG: 5 TABLET ORAL at 11:04

## 2023-01-01 RX ADMIN — TRAZODONE HYDROCHLORIDE 50 MG: 50 TABLET ORAL at 20:51

## 2023-01-01 RX ADMIN — OLOPATADINE HYDROCHLORIDE 1 DROP: 1 SOLUTION OPHTHALMIC at 10:03

## 2023-01-01 RX ADMIN — PIPERACILLIN AND TAZOBACTAM 3.38 G: 3; .375 INJECTION, POWDER, LYOPHILIZED, FOR SOLUTION INTRAVENOUS at 12:33

## 2023-01-01 RX ADMIN — PIPERACILLIN AND TAZOBACTAM 3.38 G: 3; .375 INJECTION, POWDER, LYOPHILIZED, FOR SOLUTION INTRAVENOUS at 06:09

## 2023-01-01 RX ADMIN — POLYVINYL ALCOHOL 1 DROP: 14 SOLUTION/ DROPS OPHTHALMIC at 16:31

## 2023-01-01 RX ADMIN — METOPROLOL TARTRATE 50 MG: 25 TABLET, FILM COATED ORAL at 21:57

## 2023-01-01 RX ADMIN — TAZOBACTAM SODIUM AND PIPERACILLIN SODIUM 3.38 G: 375; 3 INJECTION, SOLUTION INTRAVENOUS at 02:47

## 2023-01-01 RX ADMIN — DORZOLAMIDE HYDROCHLORIDE AND TIMOLOL MALEATE 1 DROP: 20; 5 SOLUTION/ DROPS OPHTHALMIC at 12:35

## 2023-01-01 RX ADMIN — OLOPATADINE HYDROCHLORIDE 1 DROP: 1 SOLUTION OPHTHALMIC at 20:37

## 2023-01-01 RX ADMIN — DORZOLAMIDE HYDROCHLORIDE AND TIMOLOL MALEATE 1 DROP: 20; 5 SOLUTION/ DROPS OPHTHALMIC at 21:50

## 2023-01-01 RX ADMIN — LATANOPROST 1 DROP: 50 SOLUTION/ DROPS OPHTHALMIC at 21:43

## 2023-01-01 RX ADMIN — INSULIN ASPART 3 UNITS: 100 INJECTION, SOLUTION INTRAVENOUS; SUBCUTANEOUS at 18:37

## 2023-01-01 RX ADMIN — METOPROLOL TARTRATE 50 MG: 25 TABLET, FILM COATED ORAL at 09:08

## 2023-01-01 RX ADMIN — POLYVINYL ALCOHOL 1 DROP: 14 SOLUTION/ DROPS OPHTHALMIC at 09:04

## 2023-01-01 RX ADMIN — INSULIN ASPART 2 UNITS: 100 INJECTION, SOLUTION INTRAVENOUS; SUBCUTANEOUS at 08:05

## 2023-01-01 RX ADMIN — ATORVASTATIN CALCIUM 80 MG: 40 TABLET, FILM COATED ORAL at 21:05

## 2023-01-01 RX ADMIN — THIAMINE HCL TAB 100 MG 100 MG: 100 TAB at 08:18

## 2023-01-01 RX ADMIN — OLOPATADINE HYDROCHLORIDE 1 DROP: 1 SOLUTION OPHTHALMIC at 08:56

## 2023-01-01 RX ADMIN — CARBOXYMETHYLCELLULOSE SODIUM 1 DROP: 0.5 SOLUTION/ DROPS OPHTHALMIC at 16:03

## 2023-01-01 RX ADMIN — LATANOPROST 1 DROP: 50 SOLUTION/ DROPS OPHTHALMIC at 21:54

## 2023-01-01 RX ADMIN — OXYCODONE HYDROCHLORIDE 2.5 MG: 5 TABLET ORAL at 21:36

## 2023-01-01 RX ADMIN — ACETAMINOPHEN 650 MG: 325 TABLET ORAL at 13:20

## 2023-01-01 RX ADMIN — ASPIRIN 81 MG: 81 TABLET, CHEWABLE ORAL at 08:48

## 2023-01-01 RX ADMIN — SODIUM CHLORIDE: 9 INJECTION, SOLUTION INTRAVENOUS at 21:48

## 2023-01-01 RX ADMIN — ENOXAPARIN SODIUM 30 MG: 30 INJECTION SUBCUTANEOUS at 18:24

## 2023-01-01 RX ADMIN — TRAZODONE HYDROCHLORIDE 50 MG: 50 TABLET ORAL at 20:58

## 2023-01-01 RX ADMIN — DICLOFENAC SODIUM 2 G: 10 GEL TOPICAL at 11:31

## 2023-01-01 RX ADMIN — PANTOPRAZOLE SODIUM 40 MG: 40 TABLET, DELAYED RELEASE ORAL at 16:47

## 2023-01-01 RX ADMIN — Medication 0.34 MCG/KG/MIN: at 12:53

## 2023-01-01 RX ADMIN — PANTOPRAZOLE SODIUM 40 MG: 40 TABLET, DELAYED RELEASE ORAL at 06:30

## 2023-01-01 RX ADMIN — OXYCODONE HYDROCHLORIDE 2.5 MG: 5 TABLET ORAL at 16:47

## 2023-01-01 RX ADMIN — SODIUM PHOSPHATE, MONOBASIC, MONOHYDRATE AND SODIUM PHOSPHATE, DIBASIC, ANHYDROUS 15 MMOL: 142; 276 INJECTION, SOLUTION INTRAVENOUS at 08:38

## 2023-01-01 RX ADMIN — Medication 1 TABLET: at 09:38

## 2023-01-01 RX ADMIN — DEXAMETHASONE SODIUM PHOSPHATE 4 MG: 4 INJECTION, SOLUTION INTRAMUSCULAR; INTRAVENOUS at 20:17

## 2023-01-01 RX ADMIN — FENTANYL CITRATE 50 MCG: 50 INJECTION, SOLUTION INTRAMUSCULAR; INTRAVENOUS at 20:11

## 2023-01-01 RX ADMIN — POTASSIUM CHLORIDE: 2 INJECTION, SOLUTION, CONCENTRATE INTRAVENOUS at 04:43

## 2023-01-01 RX ADMIN — INSULIN GLARGINE 20 UNITS: 100 INJECTION, SOLUTION SUBCUTANEOUS at 10:31

## 2023-01-01 RX ADMIN — POTASSIUM CHLORIDE 10 MEQ: 7.46 INJECTION, SOLUTION INTRAVENOUS at 14:25

## 2023-01-01 RX ADMIN — ATORVASTATIN CALCIUM 80 MG: 40 TABLET, FILM COATED ORAL at 08:31

## 2023-01-01 RX ADMIN — OXYCODONE HYDROCHLORIDE 2.5 MG: 5 TABLET ORAL at 02:27

## 2023-01-01 RX ADMIN — METOPROLOL TARTRATE 50 MG: 25 TABLET, FILM COATED ORAL at 21:18

## 2023-01-01 RX ADMIN — INSULIN ASPART 1 UNITS: 100 INJECTION, SOLUTION INTRAVENOUS; SUBCUTANEOUS at 12:58

## 2023-01-01 RX ADMIN — DORZOLAMIDE HYDROCHLORIDE AND TIMOLOL MALEATE 1 DROP: 22.3; 6.8 SOLUTION/ DROPS OPHTHALMIC at 19:54

## 2023-01-01 RX ADMIN — OXYCODONE HYDROCHLORIDE 2.5 MG: 5 TABLET ORAL at 01:15

## 2023-01-01 RX ADMIN — METOPROLOL TARTRATE 50 MG: 25 TABLET, FILM COATED ORAL at 09:11

## 2023-01-01 RX ADMIN — METOPROLOL TARTRATE 50 MG: 25 TABLET, FILM COATED ORAL at 20:52

## 2023-01-01 RX ADMIN — LIRAGLUTIDE 1.2 MG: 6 INJECTION SUBCUTANEOUS at 08:05

## 2023-01-01 RX ADMIN — PANTOPRAZOLE SODIUM 40 MG: 40 TABLET, DELAYED RELEASE ORAL at 06:12

## 2023-01-01 RX ADMIN — DICLOFENAC 2 G: 10 GEL TOPICAL at 02:30

## 2023-01-01 RX ADMIN — SODIUM PHOSPHATE, MONOBASIC, MONOHYDRATE AND SODIUM PHOSPHATE, DIBASIC, ANHYDROUS 9 MMOL: 142; 276 INJECTION, SOLUTION INTRAVENOUS at 17:38

## 2023-01-01 RX ADMIN — POLYVINYL ALCOHOL 1 DROP: 14 SOLUTION/ DROPS OPHTHALMIC at 20:38

## 2023-01-01 RX ADMIN — INSULIN GLARGINE 20 UNITS: 100 INJECTION, SOLUTION SUBCUTANEOUS at 09:39

## 2023-01-01 RX ADMIN — ASPIRIN 81 MG: 81 TABLET, CHEWABLE ORAL at 15:41

## 2023-01-01 RX ADMIN — DIPHENHYDRAMINE HCL 25 MG: 25 CAPSULE ORAL at 04:14

## 2023-01-01 RX ADMIN — ASPIRIN 81 MG CHEWABLE TABLET 81 MG: 81 TABLET CHEWABLE at 08:52

## 2023-01-01 RX ADMIN — ATORVASTATIN CALCIUM 80 MG: 40 TABLET, FILM COATED ORAL at 08:01

## 2023-01-01 RX ADMIN — METOPROLOL TARTRATE 50 MG: 25 TABLET, FILM COATED ORAL at 20:40

## 2023-01-01 RX ADMIN — LATANOPROST 1 DROP: 50 SOLUTION/ DROPS OPHTHALMIC at 22:01

## 2023-01-01 RX ADMIN — INSULIN ASPART 2 UNITS: 100 INJECTION, SOLUTION INTRAVENOUS; SUBCUTANEOUS at 17:19

## 2023-01-01 RX ADMIN — ENOXAPARIN SODIUM 30 MG: 30 INJECTION SUBCUTANEOUS at 17:32

## 2023-01-01 RX ADMIN — PANTOPRAZOLE SODIUM 40 MG: 40 TABLET, DELAYED RELEASE ORAL at 17:36

## 2023-01-01 RX ADMIN — DICLOFENAC SODIUM 2 G: 10 GEL TOPICAL at 18:36

## 2023-01-01 RX ADMIN — MULTIVITAMIN 15 ML: LIQUID ORAL at 08:10

## 2023-01-01 RX ADMIN — POLYVINYL ALCOHOL 1 DROP: 14 SOLUTION/ DROPS OPHTHALMIC at 14:10

## 2023-01-01 RX ADMIN — POLYVINYL ALCOHOL 1 DROP: 14 SOLUTION/ DROPS OPHTHALMIC at 20:29

## 2023-01-01 RX ADMIN — LATANOPROST 1 DROP: 50 SOLUTION/ DROPS OPHTHALMIC at 20:39

## 2023-01-01 RX ADMIN — CLINDAMYCIN PHOSPHATE 900 MG: 900 INJECTION, SOLUTION INTRAVENOUS at 10:59

## 2023-01-01 RX ADMIN — POTASSIUM & SODIUM PHOSPHATES POWDER PACK 280-160-250 MG 1 PACKET: 280-160-250 PACK at 09:28

## 2023-01-01 RX ADMIN — POTASSIUM CHLORIDE 10 MEQ: 7.46 INJECTION, SOLUTION INTRAVENOUS at 12:40

## 2023-01-01 RX ADMIN — DICLOFENAC SODIUM 2 G: 10 GEL TOPICAL at 09:33

## 2023-01-01 RX ADMIN — CLINDAMYCIN PHOSPHATE 900 MG: 900 INJECTION, SOLUTION INTRAVENOUS at 19:29

## 2023-01-01 RX ADMIN — ACETAMINOPHEN 650 MG: 325 TABLET ORAL at 13:49

## 2023-01-01 RX ADMIN — MIRTAZAPINE 15 MG: 15 TABLET, FILM COATED ORAL at 20:41

## 2023-01-01 RX ADMIN — PANTOPRAZOLE SODIUM 40 MG: 40 TABLET, DELAYED RELEASE ORAL at 10:02

## 2023-01-01 RX ADMIN — DICLOFENAC SODIUM 2 G: 10 GEL TOPICAL at 12:52

## 2023-01-01 RX ADMIN — PIPERACILLIN AND TAZOBACTAM 3.38 G: 3; .375 INJECTION, POWDER, LYOPHILIZED, FOR SOLUTION INTRAVENOUS at 12:03

## 2023-01-01 RX ADMIN — ACETAMINOPHEN 650 MG: 325 TABLET ORAL at 17:27

## 2023-01-01 RX ADMIN — INSULIN ASPART 1 UNITS: 100 INJECTION, SOLUTION INTRAVENOUS; SUBCUTANEOUS at 12:15

## 2023-01-01 RX ADMIN — PIPERACILLIN AND TAZOBACTAM 3.38 G: 3; .375 INJECTION, POWDER, LYOPHILIZED, FOR SOLUTION INTRAVENOUS at 21:23

## 2023-01-01 RX ADMIN — DICLOFENAC SODIUM 2 G: 10 GEL TOPICAL at 08:19

## 2023-01-01 RX ADMIN — ENOXAPARIN SODIUM 40 MG: 40 INJECTION SUBCUTANEOUS at 11:31

## 2023-01-01 RX ADMIN — METOPROLOL TARTRATE 100 MG: 25 TABLET, FILM COATED ORAL at 08:09

## 2023-01-01 RX ADMIN — POLYVINYL ALCOHOL 1 DROP: 14 SOLUTION/ DROPS OPHTHALMIC at 09:33

## 2023-01-01 RX ADMIN — OXYCODONE HYDROCHLORIDE 2.5 MG: 5 TABLET ORAL at 19:32

## 2023-01-01 RX ADMIN — POLYVINYL ALCOHOL 1 DROP: 14 SOLUTION/ DROPS OPHTHALMIC at 07:59

## 2023-01-01 RX ADMIN — INSULIN GLARGINE 20 UNITS: 100 INJECTION, SOLUTION SUBCUTANEOUS at 06:47

## 2023-01-01 RX ADMIN — OXYCODONE HYDROCHLORIDE 2.5 MG: 5 TABLET ORAL at 10:31

## 2023-01-01 RX ADMIN — DORZOLAMIDE HYDROCHLORIDE AND TIMOLOL MALEATE 1 DROP: 22.3; 6.8 SOLUTION/ DROPS OPHTHALMIC at 20:03

## 2023-01-01 RX ADMIN — ATORVASTATIN CALCIUM 80 MG: 40 TABLET, FILM COATED ORAL at 21:18

## 2023-01-01 RX ADMIN — ATORVASTATIN CALCIUM 80 MG: 40 TABLET, FILM COATED ORAL at 21:57

## 2023-01-01 RX ADMIN — HYDRALAZINE HYDROCHLORIDE 5 MG: 20 INJECTION INTRAMUSCULAR; INTRAVENOUS at 06:37

## 2023-01-01 RX ADMIN — DICLOFENAC SODIUM 2 G: 10 GEL TOPICAL at 16:19

## 2023-01-01 RX ADMIN — OLOPATADINE HYDROCHLORIDE 1 DROP: 1 SOLUTION OPHTHALMIC at 09:03

## 2023-01-01 RX ADMIN — OXYCODONE HYDROCHLORIDE 2.5 MG: 5 TABLET ORAL at 21:06

## 2023-01-01 RX ADMIN — METOPROLOL TARTRATE 50 MG: 25 TABLET, FILM COATED ORAL at 08:17

## 2023-01-01 RX ADMIN — OXYCODONE HYDROCHLORIDE 2.5 MG: 5 TABLET ORAL at 19:09

## 2023-01-01 RX ADMIN — PIPERACILLIN AND TAZOBACTAM 3.38 G: 3; .375 INJECTION, POWDER, LYOPHILIZED, FOR SOLUTION INTRAVENOUS at 04:47

## 2023-01-01 RX ADMIN — DORZOLAMIDE HYDROCHLORIDE AND TIMOLOL MALEATE 1 DROP: 22.3; 6.8 SOLUTION/ DROPS OPHTHALMIC at 09:25

## 2023-01-01 RX ADMIN — DICLOFENAC SODIUM 2 G: 10 GEL TOPICAL at 12:44

## 2023-01-01 RX ADMIN — DEXTROSE MONOHYDRATE 50 ML: 25 INJECTION, SOLUTION INTRAVENOUS at 17:04

## 2023-01-01 RX ADMIN — MAGNESIUM SULFATE HEPTAHYDRATE 4 G: 80 INJECTION, SOLUTION INTRAVENOUS at 08:56

## 2023-01-01 RX ADMIN — METHOCARBAMOL 500 MG: 500 TABLET, FILM COATED ORAL at 09:47

## 2023-01-01 RX ADMIN — INSULIN ASPART 3 UNITS: 100 INJECTION, SOLUTION INTRAVENOUS; SUBCUTANEOUS at 12:25

## 2023-01-01 RX ADMIN — MIRTAZAPINE 15 MG: 15 TABLET, FILM COATED ORAL at 21:05

## 2023-01-01 RX ADMIN — DICLOFENAC SODIUM 2 G: 10 GEL TOPICAL at 13:41

## 2023-01-01 RX ADMIN — POLYVINYL ALCOHOL 1 DROP: 14 SOLUTION/ DROPS OPHTHALMIC at 20:40

## 2023-01-01 RX ADMIN — HYDROMORPHONE HYDROCHLORIDE 0.4 MG: 0.2 INJECTION, SOLUTION INTRAMUSCULAR; INTRAVENOUS; SUBCUTANEOUS at 02:23

## 2023-01-01 RX ADMIN — OXYCODONE HYDROCHLORIDE 2.5 MG: 5 TABLET ORAL at 08:21

## 2023-01-01 RX ADMIN — DORZOLAMIDE HYDROCHLORIDE AND TIMOLOL MALEATE 1 DROP: 20; 5 SOLUTION/ DROPS OPHTHALMIC at 08:42

## 2023-01-01 RX ADMIN — METOPROLOL TARTRATE 50 MG: 25 TABLET, FILM COATED ORAL at 09:01

## 2023-01-01 RX ADMIN — ASPIRIN 81 MG: 81 TABLET, CHEWABLE ORAL at 09:35

## 2023-01-01 RX ADMIN — POLYVINYL ALCOHOL 1 DROP: 14 SOLUTION/ DROPS OPHTHALMIC at 13:21

## 2023-01-01 RX ADMIN — ATORVASTATIN CALCIUM 80 MG: 40 TABLET, FILM COATED ORAL at 20:40

## 2023-01-01 RX ADMIN — METHOCARBAMOL 500 MG: 500 TABLET, FILM COATED ORAL at 11:59

## 2023-01-01 RX ADMIN — DICLOFENAC SODIUM 2 G: 10 GEL TOPICAL at 18:06

## 2023-01-01 RX ADMIN — ENOXAPARIN SODIUM 30 MG: 30 INJECTION SUBCUTANEOUS at 17:04

## 2023-01-01 RX ADMIN — PIPERACILLIN AND TAZOBACTAM 3.38 G: 3; .375 INJECTION, POWDER, LYOPHILIZED, FOR SOLUTION INTRAVENOUS at 04:42

## 2023-01-01 RX ADMIN — METOPROLOL TARTRATE 50 MG: 25 TABLET, FILM COATED ORAL at 22:09

## 2023-01-01 RX ADMIN — METOPROLOL TARTRATE 50 MG: 25 TABLET, FILM COATED ORAL at 09:37

## 2023-01-01 RX ADMIN — RISPERIDONE 0.5 MG: 0.5 TABLET, FILM COATED ORAL at 08:15

## 2023-01-01 RX ADMIN — METFORMIN ER 500 MG 500 MG: 500 TABLET ORAL at 16:39

## 2023-01-01 RX ADMIN — MIRTAZAPINE 15 MG: 15 TABLET, FILM COATED ORAL at 21:48

## 2023-01-01 RX ADMIN — ATORVASTATIN CALCIUM 80 MG: 40 TABLET, FILM COATED ORAL at 20:00

## 2023-01-01 RX ADMIN — METHOCARBAMOL 500 MG: 500 TABLET, FILM COATED ORAL at 14:48

## 2023-01-01 RX ADMIN — PIPERACILLIN AND TAZOBACTAM 3.38 G: 3; .375 INJECTION, POWDER, LYOPHILIZED, FOR SOLUTION INTRAVENOUS at 13:40

## 2023-01-01 RX ADMIN — DORZOLAMIDE HYDROCHLORIDE AND TIMOLOL MALEATE 1 DROP: 20; 5 SOLUTION/ DROPS OPHTHALMIC at 08:56

## 2023-01-01 RX ADMIN — DULOXETINE HYDROCHLORIDE 90 MG: 30 CAPSULE, DELAYED RELEASE ORAL at 08:31

## 2023-01-01 RX ADMIN — ASPIRIN 81 MG CHEWABLE TABLET 81 MG: 81 TABLET CHEWABLE at 08:01

## 2023-01-01 RX ADMIN — POLYVINYL ALCOHOL 1 DROP: 14 SOLUTION/ DROPS OPHTHALMIC at 08:02

## 2023-01-01 RX ADMIN — DULOXETINE HYDROCHLORIDE 90 MG: 30 CAPSULE, DELAYED RELEASE ORAL at 08:17

## 2023-01-01 RX ADMIN — INSULIN ASPART 1 UNITS: 100 INJECTION, SOLUTION INTRAVENOUS; SUBCUTANEOUS at 09:02

## 2023-01-01 RX ADMIN — ACETAMINOPHEN 650 MG: 325 TABLET ORAL at 11:01

## 2023-01-01 RX ADMIN — ENOXAPARIN SODIUM 30 MG: 30 INJECTION SUBCUTANEOUS at 19:15

## 2023-01-01 RX ADMIN — MIRTAZAPINE 15 MG: 15 TABLET, FILM COATED ORAL at 23:06

## 2023-01-01 RX ADMIN — DICLOFENAC SODIUM 2 G: 10 GEL TOPICAL at 12:48

## 2023-01-01 RX ADMIN — PIPERACILLIN AND TAZOBACTAM 3.38 G: 3; .375 INJECTION, POWDER, LYOPHILIZED, FOR SOLUTION INTRAVENOUS at 12:38

## 2023-01-01 RX ADMIN — PHENYLEPHRINE HYDROCHLORIDE 100 MCG: 10 INJECTION INTRAVENOUS at 20:30

## 2023-01-01 RX ADMIN — ATORVASTATIN CALCIUM 80 MG: 40 TABLET, FILM COATED ORAL at 21:45

## 2023-01-01 RX ADMIN — TRAZODONE HYDROCHLORIDE 50 MG: 50 TABLET ORAL at 20:52

## 2023-01-01 RX ADMIN — CLINDAMYCIN PHOSPHATE 900 MG: 900 INJECTION, SOLUTION INTRAVENOUS at 03:07

## 2023-01-01 RX ADMIN — ENOXAPARIN SODIUM 30 MG: 30 INJECTION SUBCUTANEOUS at 18:11

## 2023-01-01 RX ADMIN — PIPERACILLIN AND TAZOBACTAM 3.38 G: 3; .375 INJECTION, POWDER, LYOPHILIZED, FOR SOLUTION INTRAVENOUS at 05:56

## 2023-01-01 RX ADMIN — DORZOLAMIDE HYDROCHLORIDE AND TIMOLOL MALEATE 1 DROP: 20; 5 SOLUTION/ DROPS OPHTHALMIC at 08:16

## 2023-01-01 RX ADMIN — MIRTAZAPINE 15 MG: 15 TABLET, FILM COATED ORAL at 22:01

## 2023-01-01 RX ADMIN — OLOPATADINE HYDROCHLORIDE 1 DROP: 1 SOLUTION OPHTHALMIC at 20:27

## 2023-01-01 RX ADMIN — OXYCODONE HYDROCHLORIDE 2.5 MG: 5 TABLET ORAL at 11:46

## 2023-01-01 RX ADMIN — DORZOLAMIDE HYDROCHLORIDE AND TIMOLOL MALEATE 1 DROP: 20; 5 SOLUTION/ DROPS OPHTHALMIC at 21:24

## 2023-01-01 RX ADMIN — PANTOPRAZOLE SODIUM 40 MG: 40 TABLET, DELAYED RELEASE ORAL at 16:12

## 2023-01-01 RX ADMIN — INSULIN GLARGINE 20 UNITS: 100 INJECTION, SOLUTION SUBCUTANEOUS at 08:02

## 2023-01-01 RX ADMIN — OLOPATADINE HYDROCHLORIDE 1 DROP: 1 SOLUTION OPHTHALMIC at 13:35

## 2023-01-01 RX ADMIN — DORZOLAMIDE HYDROCHLORIDE AND TIMOLOL MALEATE 1 DROP: 20; 5 SOLUTION/ DROPS OPHTHALMIC at 09:56

## 2023-01-01 RX ADMIN — POLYVINYL ALCOHOL 1 DROP: 14 SOLUTION/ DROPS OPHTHALMIC at 21:24

## 2023-01-01 RX ADMIN — CARBOXYMETHYLCELLULOSE SODIUM 1 DROP: 0.5 SOLUTION/ DROPS OPHTHALMIC at 20:17

## 2023-01-01 RX ADMIN — METFORMIN ER 500 MG 500 MG: 500 TABLET ORAL at 09:30

## 2023-01-01 RX ADMIN — DULOXETINE HYDROCHLORIDE 90 MG: 30 CAPSULE, DELAYED RELEASE ORAL at 09:18

## 2023-01-01 RX ADMIN — LATANOPROST 1 DROP: 50 SOLUTION/ DROPS OPHTHALMIC at 20:56

## 2023-01-01 RX ADMIN — POLYVINYL ALCOHOL 1 DROP: 14 SOLUTION/ DROPS OPHTHALMIC at 21:18

## 2023-01-01 RX ADMIN — PIPERACILLIN AND TAZOBACTAM 3.38 G: 3; .375 INJECTION, POWDER, LYOPHILIZED, FOR SOLUTION INTRAVENOUS at 20:51

## 2023-01-01 RX ADMIN — CHLORTHALIDONE 25 MG: 25 TABLET ORAL at 08:05

## 2023-01-01 RX ADMIN — OLOPATADINE HYDROCHLORIDE 1 DROP: 1 SOLUTION OPHTHALMIC at 08:32

## 2023-01-01 RX ADMIN — MIRTAZAPINE 15 MG: 15 TABLET, FILM COATED ORAL at 21:08

## 2023-01-01 RX ADMIN — SODIUM CHLORIDE 1000 ML: 9 INJECTION, SOLUTION INTRAVENOUS at 03:30

## 2023-01-01 RX ADMIN — POLYVINYL ALCOHOL 1 DROP: 14 SOLUTION/ DROPS OPHTHALMIC at 10:21

## 2023-01-01 RX ADMIN — ATORVASTATIN CALCIUM 80 MG: 40 TABLET, FILM COATED ORAL at 21:14

## 2023-01-01 RX ADMIN — POTASSIUM & SODIUM PHOSPHATES POWDER PACK 280-160-250 MG 1 PACKET: 280-160-250 PACK at 12:12

## 2023-01-01 RX ADMIN — DORZOLAMIDE HYDROCHLORIDE AND TIMOLOL MALEATE 1 DROP: 20; 5 SOLUTION/ DROPS OPHTHALMIC at 21:54

## 2023-01-01 RX ADMIN — PIPERACILLIN AND TAZOBACTAM 3.38 G: 3; .375 INJECTION, POWDER, LYOPHILIZED, FOR SOLUTION INTRAVENOUS at 13:50

## 2023-01-01 RX ADMIN — METOPROLOL TARTRATE 50 MG: 25 TABLET, FILM COATED ORAL at 09:33

## 2023-01-01 RX ADMIN — OXYCODONE HYDROCHLORIDE 2.5 MG: 5 TABLET ORAL at 02:21

## 2023-01-01 RX ADMIN — CARBOXYMETHYLCELLULOSE SODIUM 1 DROP: 0.5 SOLUTION/ DROPS OPHTHALMIC at 19:09

## 2023-01-01 RX ADMIN — DULOXETINE HYDROCHLORIDE 90 MG: 30 CAPSULE, DELAYED RELEASE ORAL at 08:04

## 2023-01-01 RX ADMIN — PHENYLEPHRINE HYDROCHLORIDE 0.5 MCG/KG/MIN: 50 INJECTION INTRAVENOUS at 21:30

## 2023-01-01 RX ADMIN — PIPERACILLIN AND TAZOBACTAM 3.38 G: 3; .375 INJECTION, POWDER, LYOPHILIZED, FOR SOLUTION INTRAVENOUS at 05:30

## 2023-01-01 RX ADMIN — METOPROLOL TARTRATE 50 MG: 25 TABLET, FILM COATED ORAL at 08:28

## 2023-01-01 RX ADMIN — THIAMINE HCL TAB 100 MG 100 MG: 100 TAB at 09:36

## 2023-01-01 RX ADMIN — AMOXICILLIN AND CLAVULANATE POTASSIUM 1 TABLET: 875; 125 TABLET, FILM COATED ORAL at 21:53

## 2023-01-01 RX ADMIN — TRAZODONE HYDROCHLORIDE 50 MG: 50 TABLET ORAL at 20:12

## 2023-01-01 RX ADMIN — DICLOFENAC SODIUM 2 G: 10 GEL TOPICAL at 12:34

## 2023-01-01 RX ADMIN — METOPROLOL TARTRATE 50 MG: 25 TABLET, FILM COATED ORAL at 21:24

## 2023-01-01 RX ADMIN — DICLOFENAC SODIUM 2 G: 10 GEL TOPICAL at 13:04

## 2023-01-01 RX ADMIN — Medication 1 PACKET: at 09:04

## 2023-01-01 RX ADMIN — ASPIRIN 81 MG: 81 TABLET, CHEWABLE ORAL at 09:07

## 2023-01-01 RX ADMIN — DICLOFENAC SODIUM 2 G: 10 GEL TOPICAL at 09:19

## 2023-01-01 RX ADMIN — DULOXETINE HYDROCHLORIDE 90 MG: 60 CAPSULE, DELAYED RELEASE PELLETS ORAL at 09:36

## 2023-01-01 RX ADMIN — PANTOPRAZOLE SODIUM 40 MG: 40 TABLET, DELAYED RELEASE ORAL at 06:45

## 2023-01-01 RX ADMIN — DICLOFENAC SODIUM 2 G: 10 GEL TOPICAL at 12:00

## 2023-01-01 RX ADMIN — DEXTROSE MONOHYDRATE: 100 INJECTION, SOLUTION INTRAVENOUS at 17:54

## 2023-01-01 RX ADMIN — ACETAMINOPHEN 650 MG: 325 TABLET ORAL at 22:53

## 2023-01-01 RX ADMIN — DULOXETINE HYDROCHLORIDE 90 MG: 30 CAPSULE, DELAYED RELEASE ORAL at 09:17

## 2023-01-01 RX ADMIN — ASPIRIN 81 MG CHEWABLE TABLET 81 MG: 81 TABLET CHEWABLE at 12:08

## 2023-01-01 RX ADMIN — DICLOFENAC SODIUM 2 G: 10 GEL TOPICAL at 21:55

## 2023-01-01 RX ADMIN — DICLOFENAC SODIUM 2 G: 10 GEL TOPICAL at 21:23

## 2023-01-01 RX ADMIN — POTASSIUM CHLORIDE: 2 INJECTION, SOLUTION, CONCENTRATE INTRAVENOUS at 15:03

## 2023-01-01 RX ADMIN — POTASSIUM CHLORIDE 10 MEQ: 7.46 INJECTION, SOLUTION INTRAVENOUS at 08:18

## 2023-01-01 RX ADMIN — VASOPRESSIN 2.4 UNITS/HR: 20 INJECTION, SOLUTION INTRAMUSCULAR; SUBCUTANEOUS at 15:15

## 2023-01-01 RX ADMIN — METOPROLOL TARTRATE 50 MG: 25 TABLET, FILM COATED ORAL at 12:40

## 2023-01-01 RX ADMIN — POLYVINYL ALCOHOL 1 DROP: 14 SOLUTION/ DROPS OPHTHALMIC at 20:31

## 2023-01-01 RX ADMIN — POLYVINYL ALCOHOL 1 DROP: 14 SOLUTION/ DROPS OPHTHALMIC at 13:41

## 2023-01-01 RX ADMIN — ENOXAPARIN SODIUM 30 MG: 30 INJECTION SUBCUTANEOUS at 18:53

## 2023-01-01 RX ADMIN — POLYVINYL ALCOHOL 1 DROP: 14 SOLUTION/ DROPS OPHTHALMIC at 20:57

## 2023-01-01 RX ADMIN — METOPROLOL TARTRATE 50 MG: 25 TABLET, FILM COATED ORAL at 09:04

## 2023-01-01 RX ADMIN — ATORVASTATIN CALCIUM 80 MG: 40 TABLET, FILM COATED ORAL at 21:08

## 2023-01-01 RX ADMIN — OLOPATADINE HYDROCHLORIDE 1 DROP: 1 SOLUTION OPHTHALMIC at 08:02

## 2023-01-01 RX ADMIN — DORZOLAMIDE HYDROCHLORIDE AND TIMOLOL MALEATE 1 DROP: 20; 5 SOLUTION/ DROPS OPHTHALMIC at 12:40

## 2023-01-01 RX ADMIN — TRAZODONE HYDROCHLORIDE 50 MG: 50 TABLET ORAL at 20:54

## 2023-01-01 RX ADMIN — RISPERIDONE 0.5 MG: 0.5 TABLET, FILM COATED ORAL at 20:27

## 2023-01-01 RX ADMIN — POTASSIUM CHLORIDE 10 MEQ: 7.46 INJECTION, SOLUTION INTRAVENOUS at 10:12

## 2023-01-01 RX ADMIN — CARBOXYMETHYLCELLULOSE SODIUM 1 DROP: 0.5 SOLUTION/ DROPS OPHTHALMIC at 08:49

## 2023-01-01 RX ADMIN — DICLOFENAC SODIUM 2 G: 10 GEL TOPICAL at 10:02

## 2023-01-01 RX ADMIN — DORZOLAMIDE HYDROCHLORIDE AND TIMOLOL MALEATE 1 DROP: 20; 5 SOLUTION/ DROPS OPHTHALMIC at 09:40

## 2023-01-01 RX ADMIN — METFORMIN ER 500 MG 500 MG: 500 TABLET ORAL at 17:27

## 2023-01-01 RX ADMIN — MULTIVITAMIN 15 ML: LIQUID ORAL at 17:24

## 2023-01-01 RX ADMIN — OXYCODONE HYDROCHLORIDE 2.5 MG: 5 TABLET ORAL at 06:07

## 2023-01-01 RX ADMIN — SODIUM CHLORIDE 56 ML: 9 INJECTION, SOLUTION INTRAVENOUS at 15:37

## 2023-01-01 RX ADMIN — PIPERACILLIN AND TAZOBACTAM 3.38 G: 3; .375 INJECTION, POWDER, LYOPHILIZED, FOR SOLUTION INTRAVENOUS at 12:32

## 2023-01-01 RX ADMIN — HYDROMORPHONE HYDROCHLORIDE 0.4 MG: 0.2 INJECTION, SOLUTION INTRAMUSCULAR; INTRAVENOUS; SUBCUTANEOUS at 12:00

## 2023-01-01 RX ADMIN — PIPERACILLIN AND TAZOBACTAM 3.38 G: 3; .375 INJECTION, POWDER, LYOPHILIZED, FOR SOLUTION INTRAVENOUS at 21:08

## 2023-01-01 RX ADMIN — INSULIN ASPART 1 UNITS: 100 INJECTION, SOLUTION INTRAVENOUS; SUBCUTANEOUS at 13:04

## 2023-01-01 RX ADMIN — OLOPATADINE HYDROCHLORIDE 1 DROP: 1 SOLUTION OPHTHALMIC at 09:32

## 2023-01-01 RX ADMIN — DULOXETINE HYDROCHLORIDE 90 MG: 60 CAPSULE, DELAYED RELEASE PELLETS ORAL at 08:40

## 2023-01-01 RX ADMIN — Medication 1 TABLET: at 09:34

## 2023-01-01 RX ADMIN — Medication 40 MG: at 17:04

## 2023-01-01 RX ADMIN — DORZOLAMIDE HYDROCHLORIDE AND TIMOLOL MALEATE 1 DROP: 20; 5 SOLUTION/ DROPS OPHTHALMIC at 10:02

## 2023-01-01 RX ADMIN — DULOXETINE HYDROCHLORIDE 90 MG: 30 CAPSULE, DELAYED RELEASE ORAL at 10:02

## 2023-01-01 RX ADMIN — MAGNESIUM SULFATE HEPTAHYDRATE 2 G: 40 INJECTION, SOLUTION INTRAVENOUS at 13:45

## 2023-01-01 RX ADMIN — INSULIN GLARGINE 20 UNITS: 100 INJECTION, SOLUTION SUBCUTANEOUS at 06:32

## 2023-01-01 RX ADMIN — LATANOPROST 1 DROP: 50 SOLUTION/ DROPS OPHTHALMIC at 21:08

## 2023-01-01 RX ADMIN — POLYVINYL ALCOHOL 1 DROP: 14 SOLUTION/ DROPS OPHTHALMIC at 13:36

## 2023-01-01 RX ADMIN — METOPROLOL TARTRATE 50 MG: 25 TABLET, FILM COATED ORAL at 21:45

## 2023-01-01 RX ADMIN — OXYCODONE HYDROCHLORIDE 2.5 MG: 5 SOLUTION ORAL at 13:16

## 2023-01-01 RX ADMIN — PIPERACILLIN AND TAZOBACTAM 3.38 G: 3; .375 INJECTION, POWDER, LYOPHILIZED, FOR SOLUTION INTRAVENOUS at 12:40

## 2023-01-01 RX ADMIN — DICLOFENAC SODIUM 2 G: 10 GEL TOPICAL at 09:41

## 2023-01-01 RX ADMIN — TAZOBACTAM SODIUM AND PIPERACILLIN SODIUM 3.38 G: 375; 3 INJECTION, SOLUTION INTRAVENOUS at 13:18

## 2023-01-01 RX ADMIN — DULOXETINE HYDROCHLORIDE 90 MG: 60 CAPSULE, DELAYED RELEASE PELLETS ORAL at 08:52

## 2023-01-01 RX ADMIN — PIPERACILLIN AND TAZOBACTAM 3.38 G: 3; .375 INJECTION, POWDER, LYOPHILIZED, FOR SOLUTION INTRAVENOUS at 05:14

## 2023-01-01 RX ADMIN — OXYCODONE HYDROCHLORIDE 2.5 MG: 5 TABLET ORAL at 21:52

## 2023-01-01 RX ADMIN — METFORMIN ER 500 MG 500 MG: 500 TABLET ORAL at 09:32

## 2023-01-01 RX ADMIN — Medication 1 PACKET: at 08:43

## 2023-01-01 RX ADMIN — THIAMINE HCL TAB 100 MG 100 MG: 100 TAB at 08:10

## 2023-01-01 RX ADMIN — LATANOPROST 1 DROP: 50 SOLUTION OPHTHALMIC at 21:53

## 2023-01-01 RX ADMIN — OXYCODONE HYDROCHLORIDE 2.5 MG: 5 TABLET ORAL at 08:50

## 2023-01-01 RX ADMIN — DICLOFENAC SODIUM 2 G: 10 GEL TOPICAL at 20:40

## 2023-01-01 RX ADMIN — METFORMIN ER 500 MG 500 MG: 500 TABLET ORAL at 16:15

## 2023-01-01 RX ADMIN — LIRAGLUTIDE 1.2 MG: 6 INJECTION SUBCUTANEOUS at 09:21

## 2023-01-01 RX ADMIN — TRAZODONE HYDROCHLORIDE 50 MG: 50 TABLET ORAL at 21:57

## 2023-01-01 RX ADMIN — PIPERACILLIN AND TAZOBACTAM 3.38 G: 3; .375 INJECTION, POWDER, LYOPHILIZED, FOR SOLUTION INTRAVENOUS at 05:58

## 2023-01-01 RX ADMIN — METHOCARBAMOL 500 MG: 500 TABLET, FILM COATED ORAL at 08:21

## 2023-01-01 RX ADMIN — POLYVINYL ALCOHOL 1 DROP: 14 SOLUTION/ DROPS OPHTHALMIC at 09:56

## 2023-01-01 RX ADMIN — HALOPERIDOL LACTATE 2 MG: 5 INJECTION, SOLUTION INTRAMUSCULAR at 04:18

## 2023-01-01 RX ADMIN — OXYCODONE HYDROCHLORIDE 2.5 MG: 5 TABLET ORAL at 09:45

## 2023-01-01 RX ADMIN — OLOPATADINE HYDROCHLORIDE 1 DROP: 1 SOLUTION OPHTHALMIC at 09:26

## 2023-01-01 RX ADMIN — TAZOBACTAM SODIUM AND PIPERACILLIN SODIUM 3.38 G: 375; 3 INJECTION, SOLUTION INTRAVENOUS at 09:09

## 2023-01-01 RX ADMIN — ENOXAPARIN SODIUM 30 MG: 30 INJECTION SUBCUTANEOUS at 17:45

## 2023-01-01 RX ADMIN — ASPIRIN 81 MG: 81 TABLET, CHEWABLE ORAL at 17:57

## 2023-01-01 RX ADMIN — PHENYLEPHRINE HYDROCHLORIDE 200 MCG: 10 INJECTION INTRAVENOUS at 20:51

## 2023-01-01 RX ADMIN — DULOXETINE HYDROCHLORIDE 90 MG: 30 CAPSULE, DELAYED RELEASE ORAL at 12:03

## 2023-01-01 RX ADMIN — POLYVINYL ALCOHOL 1 DROP: 14 SOLUTION/ DROPS OPHTHALMIC at 08:41

## 2023-01-01 RX ADMIN — OXYCODONE HYDROCHLORIDE 2.5 MG: 5 TABLET ORAL at 05:20

## 2023-01-01 RX ADMIN — POLYVINYL ALCOHOL 1 DROP: 14 SOLUTION/ DROPS OPHTHALMIC at 12:29

## 2023-01-01 RX ADMIN — DIPHENHYDRAMINE HYDROCHLORIDE 25 MG: 50 INJECTION, SOLUTION INTRAMUSCULAR; INTRAVENOUS at 00:50

## 2023-01-01 RX ADMIN — PANTOPRAZOLE SODIUM 40 MG: 40 TABLET, DELAYED RELEASE ORAL at 07:03

## 2023-01-01 RX ADMIN — ATORVASTATIN CALCIUM 80 MG: 40 TABLET, FILM COATED ORAL at 08:09

## 2023-01-01 RX ADMIN — POTASSIUM CHLORIDE: 2 INJECTION, SOLUTION, CONCENTRATE INTRAVENOUS at 21:30

## 2023-01-01 RX ADMIN — INSULIN ASPART 1 UNITS: 100 INJECTION, SOLUTION INTRAVENOUS; SUBCUTANEOUS at 09:06

## 2023-01-01 RX ADMIN — POLYVINYL ALCOHOL 1 DROP: 14 SOLUTION/ DROPS OPHTHALMIC at 15:41

## 2023-01-01 RX ADMIN — POLYVINYL ALCOHOL 1 DROP: 14 SOLUTION/ DROPS OPHTHALMIC at 10:02

## 2023-01-01 RX ADMIN — OLOPATADINE HYDROCHLORIDE 1 DROP: 1 SOLUTION OPHTHALMIC at 09:02

## 2023-01-01 RX ADMIN — DICLOFENAC SODIUM 2 G: 10 GEL TOPICAL at 16:58

## 2023-01-01 RX ADMIN — FENTANYL CITRATE 50 MCG: 50 INJECTION, SOLUTION INTRAMUSCULAR; INTRAVENOUS at 20:35

## 2023-01-01 RX ADMIN — TRAZODONE HYDROCHLORIDE 50 MG: 50 TABLET ORAL at 21:04

## 2023-01-01 RX ADMIN — INSULIN ASPART 1 UNITS: 100 INJECTION, SOLUTION INTRAVENOUS; SUBCUTANEOUS at 12:31

## 2023-01-01 RX ADMIN — LATANOPROST 1 DROP: 50 SOLUTION/ DROPS OPHTHALMIC at 20:54

## 2023-01-01 RX ADMIN — SUGAMMADEX 200 MG: 100 INJECTION, SOLUTION INTRAVENOUS at 21:04

## 2023-01-01 RX ADMIN — OLOPATADINE HYDROCHLORIDE 1 DROP: 1 SOLUTION OPHTHALMIC at 09:37

## 2023-01-01 RX ADMIN — DICLOFENAC SODIUM 2 G: 10 GEL TOPICAL at 16:54

## 2023-01-01 RX ADMIN — DICLOFENAC SODIUM 2 G: 10 GEL TOPICAL at 10:44

## 2023-01-01 RX ADMIN — POLYVINYL ALCOHOL 1 DROP: 14 SOLUTION/ DROPS OPHTHALMIC at 13:52

## 2023-01-01 RX ADMIN — PIPERACILLIN AND TAZOBACTAM 3.38 G: 3; .375 INJECTION, POWDER, LYOPHILIZED, FOR SOLUTION INTRAVENOUS at 22:48

## 2023-01-01 RX ADMIN — Medication 1 PACKET: at 09:55

## 2023-01-01 RX ADMIN — METOPROLOL TARTRATE 50 MG: 25 TABLET, FILM COATED ORAL at 09:25

## 2023-01-01 RX ADMIN — POLYVINYL ALCOHOL 1 DROP: 14 SOLUTION/ DROPS OPHTHALMIC at 13:05

## 2023-01-01 RX ADMIN — POLYVINYL ALCOHOL 1 DROP: 14 SOLUTION/ DROPS OPHTHALMIC at 14:06

## 2023-01-01 RX ADMIN — OXYCODONE HYDROCHLORIDE 2.5 MG: 5 TABLET ORAL at 13:46

## 2023-01-01 RX ADMIN — AMOXICILLIN AND CLAVULANATE POTASSIUM 1 TABLET: 875; 125 TABLET, FILM COATED ORAL at 08:41

## 2023-01-01 RX ADMIN — PIPERACILLIN AND TAZOBACTAM 3.38 G: 3; .375 INJECTION, POWDER, LYOPHILIZED, FOR SOLUTION INTRAVENOUS at 21:45

## 2023-01-01 RX ADMIN — SENNOSIDES AND DOCUSATE SODIUM 1 TABLET: 50; 8.6 TABLET ORAL at 21:12

## 2023-01-01 RX ADMIN — Medication 1 PACKET: at 17:44

## 2023-01-01 RX ADMIN — SODIUM BICARBONATE: 84 INJECTION, SOLUTION INTRAVENOUS at 06:41

## 2023-01-01 RX ADMIN — PANTOPRAZOLE SODIUM 40 MG: 40 TABLET, DELAYED RELEASE ORAL at 17:57

## 2023-01-01 RX ADMIN — POLYVINYL ALCOHOL 1 DROP: 14 SOLUTION/ DROPS OPHTHALMIC at 21:16

## 2023-01-01 RX ADMIN — HYDROMORPHONE HYDROCHLORIDE 0.4 MG: 0.2 INJECTION, SOLUTION INTRAMUSCULAR; INTRAVENOUS; SUBCUTANEOUS at 21:39

## 2023-01-01 RX ADMIN — Medication 40 MG: at 06:41

## 2023-01-01 RX ADMIN — PANTOPRAZOLE SODIUM 40 MG: 40 TABLET, DELAYED RELEASE ORAL at 05:44

## 2023-01-01 RX ADMIN — PIPERACILLIN AND TAZOBACTAM 3.38 G: 3; .375 INJECTION, POWDER, LYOPHILIZED, FOR SOLUTION INTRAVENOUS at 12:57

## 2023-01-01 RX ADMIN — PANTOPRAZOLE SODIUM 40 MG: 40 TABLET, DELAYED RELEASE ORAL at 09:31

## 2023-01-01 RX ADMIN — OXYCODONE HYDROCHLORIDE 2.5 MG: 5 TABLET ORAL at 13:53

## 2023-01-01 RX ADMIN — OLOPATADINE HYDROCHLORIDE 1 DROP: 1 SOLUTION OPHTHALMIC at 08:46

## 2023-01-01 RX ADMIN — DICLOFENAC SODIUM 2 G: 10 GEL TOPICAL at 17:33

## 2023-01-01 RX ADMIN — METOPROLOL TARTRATE 100 MG: 25 TABLET, FILM COATED ORAL at 20:19

## 2023-01-01 RX ADMIN — PIPERACILLIN AND TAZOBACTAM 3.38 G: 3; .375 INJECTION, POWDER, LYOPHILIZED, FOR SOLUTION INTRAVENOUS at 13:04

## 2023-01-01 RX ADMIN — DEXTROSE MONOHYDRATE AND SODIUM CHLORIDE: 5; .45 INJECTION, SOLUTION INTRAVENOUS at 00:08

## 2023-01-01 RX ADMIN — LIDOCAINE: 50 OINTMENT TOPICAL at 00:03

## 2023-01-01 RX ADMIN — ASPIRIN 81 MG CHEWABLE TABLET 81 MG: 81 TABLET CHEWABLE at 08:04

## 2023-01-01 RX ADMIN — DULOXETINE HYDROCHLORIDE 90 MG: 60 CAPSULE, DELAYED RELEASE ORAL at 09:01

## 2023-01-01 RX ADMIN — ASPIRIN 81 MG CHEWABLE TABLET 81 MG: 81 TABLET CHEWABLE at 17:07

## 2023-01-01 RX ADMIN — METOPROLOL TARTRATE 50 MG: 25 TABLET, FILM COATED ORAL at 21:53

## 2023-01-01 RX ADMIN — OXYCODONE HYDROCHLORIDE 2.5 MG: 5 TABLET ORAL at 06:22

## 2023-01-01 RX ADMIN — MIRTAZAPINE 15 MG: 15 TABLET, FILM COATED ORAL at 20:33

## 2023-01-01 RX ADMIN — POLYVINYL ALCOHOL 1 DROP: 14 SOLUTION/ DROPS OPHTHALMIC at 08:01

## 2023-01-01 RX ADMIN — PIPERACILLIN AND TAZOBACTAM 3.38 G: 3; .375 INJECTION, POWDER, LYOPHILIZED, FOR SOLUTION INTRAVENOUS at 21:01

## 2023-01-01 RX ADMIN — TRAZODONE HYDROCHLORIDE 50 MG: 50 TABLET ORAL at 20:57

## 2023-01-01 RX ADMIN — DICLOFENAC SODIUM 2 G: 10 GEL TOPICAL at 09:40

## 2023-01-01 RX ADMIN — METOPROLOL TARTRATE 50 MG: 25 TABLET, FILM COATED ORAL at 20:57

## 2023-01-01 RX ADMIN — DICLOFENAC SODIUM 2 G: 10 GEL TOPICAL at 21:00

## 2023-01-01 RX ADMIN — DORZOLAMIDE HYDROCHLORIDE AND TIMOLOL MALEATE 1 DROP: 20; 5 SOLUTION/ DROPS OPHTHALMIC at 21:08

## 2023-01-01 RX ADMIN — SODIUM PHOSPHATE, MONOBASIC, MONOHYDRATE AND SODIUM PHOSPHATE, DIBASIC, ANHYDROUS 15 MMOL: 142; 276 INJECTION, SOLUTION INTRAVENOUS at 21:46

## 2023-01-01 RX ADMIN — CALCIUM GLUCONATE 1 G: 20 INJECTION, SOLUTION INTRAVENOUS at 22:00

## 2023-01-01 RX ADMIN — CHLORTHALIDONE 25 MG: 25 TABLET ORAL at 08:15

## 2023-01-01 RX ADMIN — SODIUM BICARBONATE: 84 INJECTION, SOLUTION INTRAVENOUS at 21:22

## 2023-01-01 RX ADMIN — DICLOFENAC SODIUM 2 G: 10 GEL TOPICAL at 21:51

## 2023-01-01 RX ADMIN — OXYCODONE HYDROCHLORIDE 2.5 MG: 5 TABLET ORAL at 08:18

## 2023-01-01 RX ADMIN — CARBOXYMETHYLCELLULOSE SODIUM 1 DROP: 0.5 SOLUTION/ DROPS OPHTHALMIC at 08:52

## 2023-01-01 RX ADMIN — PIPERACILLIN AND TAZOBACTAM 3.38 G: 3; .375 INJECTION, POWDER, LYOPHILIZED, FOR SOLUTION INTRAVENOUS at 05:28

## 2023-01-01 RX ADMIN — MAGNESIUM SULFATE HEPTAHYDRATE 2 G: 40 INJECTION, SOLUTION INTRAVENOUS at 15:45

## 2023-01-01 RX ADMIN — TRAZODONE HYDROCHLORIDE 50 MG: 50 TABLET ORAL at 20:10

## 2023-01-01 RX ADMIN — TAZOBACTAM SODIUM AND PIPERACILLIN SODIUM 3.38 G: 375; 3 INJECTION, SOLUTION INTRAVENOUS at 03:55

## 2023-01-01 RX ADMIN — ASPIRIN 81 MG: 81 TABLET, CHEWABLE ORAL at 08:41

## 2023-01-01 RX ADMIN — INSULIN ASPART 2 UNITS: 100 INJECTION, SOLUTION INTRAVENOUS; SUBCUTANEOUS at 08:17

## 2023-01-01 RX ADMIN — OLOPATADINE HYDROCHLORIDE 1 DROP: 1 SOLUTION OPHTHALMIC at 08:31

## 2023-01-01 RX ADMIN — DICLOFENAC SODIUM 2 G: 10 GEL TOPICAL at 16:39

## 2023-01-01 RX ADMIN — ACETAMINOPHEN 650 MG: 325 SOLUTION ORAL at 09:28

## 2023-01-01 RX ADMIN — DORZOLAMIDE HYDROCHLORIDE AND TIMOLOL MALEATE 1 DROP: 20; 5 SOLUTION/ DROPS OPHTHALMIC at 08:20

## 2023-01-01 RX ADMIN — ENOXAPARIN SODIUM 30 MG: 30 INJECTION SUBCUTANEOUS at 17:34

## 2023-01-01 RX ADMIN — ASPIRIN 81 MG: 81 TABLET, CHEWABLE ORAL at 09:18

## 2023-01-01 RX ADMIN — METOPROLOL TARTRATE 50 MG: 25 TABLET, FILM COATED ORAL at 21:02

## 2023-01-01 RX ADMIN — THIAMINE HCL TAB 100 MG 100 MG: 100 TAB at 09:07

## 2023-01-01 RX ADMIN — DEXAMETHASONE SODIUM PHOSPHATE 4 MG: 4 INJECTION, SOLUTION INTRAMUSCULAR; INTRAVENOUS at 08:30

## 2023-01-01 RX ADMIN — PIPERACILLIN AND TAZOBACTAM 3.38 G: 3; .375 INJECTION, POWDER, LYOPHILIZED, FOR SOLUTION INTRAVENOUS at 06:03

## 2023-01-01 RX ADMIN — ASPIRIN 81 MG: 81 TABLET, CHEWABLE ORAL at 09:08

## 2023-01-01 RX ADMIN — DORZOLAMIDE HYDROCHLORIDE AND TIMOLOL MALEATE 1 DROP: 20; 5 SOLUTION/ DROPS OPHTHALMIC at 20:29

## 2023-01-01 RX ADMIN — DICLOFENAC SODIUM 2 G: 10 GEL TOPICAL at 13:51

## 2023-01-01 RX ADMIN — MAGNESIUM SULFATE HEPTAHYDRATE 4 G: 80 INJECTION, SOLUTION INTRAVENOUS at 11:53

## 2023-01-01 RX ADMIN — ACETAMINOPHEN 650 MG: 325 TABLET ORAL at 20:01

## 2023-01-01 RX ADMIN — PHENYLEPHRINE HYDROCHLORIDE 100 MCG: 10 INJECTION INTRAVENOUS at 20:42

## 2023-01-01 RX ADMIN — ENOXAPARIN SODIUM 30 MG: 30 INJECTION SUBCUTANEOUS at 17:54

## 2023-01-01 RX ADMIN — THIAMINE HCL TAB 100 MG 100 MG: 100 TAB at 10:12

## 2023-01-01 RX ADMIN — ASPIRIN 81 MG: 81 TABLET, CHEWABLE ORAL at 08:42

## 2023-01-01 RX ADMIN — TRAZODONE HYDROCHLORIDE 50 MG: 50 TABLET ORAL at 21:55

## 2023-01-01 RX ADMIN — PIPERACILLIN AND TAZOBACTAM 3.38 G: 3; .375 INJECTION, POWDER, LYOPHILIZED, FOR SOLUTION INTRAVENOUS at 23:56

## 2023-01-01 RX ADMIN — AMOXICILLIN AND CLAVULANATE POTASSIUM 1 TABLET: 875; 125 TABLET, FILM COATED ORAL at 09:31

## 2023-01-01 RX ADMIN — PIPERACILLIN AND TAZOBACTAM 3.38 G: 3; .375 INJECTION, POWDER, LYOPHILIZED, FOR SOLUTION INTRAVENOUS at 06:00

## 2023-01-01 RX ADMIN — INSULIN ASPART 1 UNITS: 100 INJECTION, SOLUTION INTRAVENOUS; SUBCUTANEOUS at 22:47

## 2023-01-01 RX ADMIN — DICLOFENAC SODIUM 2 G: 10 GEL TOPICAL at 09:38

## 2023-01-01 RX ADMIN — PANTOPRAZOLE SODIUM 40 MG: 40 TABLET, DELAYED RELEASE ORAL at 16:09

## 2023-01-01 RX ADMIN — MAGNESIUM SULFATE HEPTAHYDRATE 4 G: 80 INJECTION, SOLUTION INTRAVENOUS at 08:27

## 2023-01-01 RX ADMIN — DICLOFENAC SODIUM 2 G: 10 GEL TOPICAL at 21:19

## 2023-01-01 RX ADMIN — RISPERIDONE 0.5 MG: 0.5 TABLET, FILM COATED ORAL at 12:10

## 2023-01-01 RX ADMIN — POTASSIUM CHLORIDE FOR ORAL SOLUTION 20 MEQ: 1.5 POWDER, FOR SOLUTION ORAL at 19:17

## 2023-01-01 RX ADMIN — PROPOFOL 80 MG: 10 INJECTION, EMULSION INTRAVENOUS at 20:11

## 2023-01-01 RX ADMIN — ACETAMINOPHEN 650 MG: 325 SOLUTION ORAL at 19:50

## 2023-01-01 RX ADMIN — INSULIN GLARGINE 20 UNITS: 100 INJECTION, SOLUTION SUBCUTANEOUS at 08:06

## 2023-01-01 RX ADMIN — THIAMINE HCL TAB 100 MG 100 MG: 100 TAB at 09:45

## 2023-01-01 RX ADMIN — PANTOPRAZOLE SODIUM 40 MG: 40 TABLET, DELAYED RELEASE ORAL at 10:18

## 2023-01-01 RX ADMIN — METOPROLOL TARTRATE 50 MG: 25 TABLET, FILM COATED ORAL at 09:48

## 2023-01-01 RX ADMIN — TRAZODONE HYDROCHLORIDE 50 MG: 50 TABLET ORAL at 21:30

## 2023-01-01 RX ADMIN — METHOCARBAMOL 500 MG: 500 TABLET, FILM COATED ORAL at 13:05

## 2023-01-01 RX ADMIN — Medication 1 TABLET: at 13:21

## 2023-01-01 RX ADMIN — DICLOFENAC SODIUM 2 G: 10 GEL TOPICAL at 20:13

## 2023-01-01 RX ADMIN — PIPERACILLIN AND TAZOBACTAM 3.38 G: 3; .375 INJECTION, POWDER, LYOPHILIZED, FOR SOLUTION INTRAVENOUS at 04:32

## 2023-01-01 RX ADMIN — LIDOCAINE HYDROCHLORIDE 2 ML: 10 INJECTION, SOLUTION EPIDURAL; INFILTRATION; INTRACAUDAL; PERINEURAL at 23:23

## 2023-01-01 RX ADMIN — METOPROLOL TARTRATE 25 MG: 25 TABLET, FILM COATED ORAL at 21:14

## 2023-01-01 RX ADMIN — THIAMINE HCL TAB 100 MG 100 MG: 100 TAB at 09:51

## 2023-01-01 RX ADMIN — TAZOBACTAM SODIUM AND PIPERACILLIN SODIUM 3.38 G: 375; 3 INJECTION, SOLUTION INTRAVENOUS at 04:44

## 2023-01-01 RX ADMIN — ENOXAPARIN SODIUM 40 MG: 40 INJECTION SUBCUTANEOUS at 08:03

## 2023-01-01 RX ADMIN — DICLOFENAC SODIUM 2 G: 10 GEL TOPICAL at 09:55

## 2023-01-01 RX ADMIN — METHOCARBAMOL 500 MG: 500 TABLET, FILM COATED ORAL at 02:27

## 2023-01-01 RX ADMIN — POLYVINYL ALCOHOL 1 DROP: 14 SOLUTION/ DROPS OPHTHALMIC at 09:36

## 2023-01-01 RX ADMIN — DICLOFENAC SODIUM 2 G: 10 GEL TOPICAL at 10:09

## 2023-01-01 RX ADMIN — HYDROMORPHONE HYDROCHLORIDE 0.2 MG: 0.2 INJECTION, SOLUTION INTRAMUSCULAR; INTRAVENOUS; SUBCUTANEOUS at 15:15

## 2023-01-01 RX ADMIN — POLYVINYL ALCOHOL 1 DROP: 14 SOLUTION/ DROPS OPHTHALMIC at 21:32

## 2023-01-01 RX ADMIN — Medication 0.25 MCG/KG/MIN: at 05:20

## 2023-01-01 RX ADMIN — LATANOPROST 1 DROP: 50 SOLUTION OPHTHALMIC at 21:20

## 2023-01-01 RX ADMIN — OLOPATADINE HYDROCHLORIDE 1 DROP: 1 SOLUTION OPHTHALMIC at 09:39

## 2023-01-01 RX ADMIN — DICLOFENAC SODIUM 2 G: 10 GEL TOPICAL at 16:13

## 2023-01-01 RX ADMIN — PANTOPRAZOLE SODIUM 40 MG: 40 TABLET, DELAYED RELEASE ORAL at 06:46

## 2023-01-01 RX ADMIN — OXYCODONE HYDROCHLORIDE 2.5 MG: 5 TABLET ORAL at 23:42

## 2023-01-01 RX ADMIN — VASOPRESSIN 2.4 UNITS/HR: 20 INJECTION, SOLUTION INTRAMUSCULAR; SUBCUTANEOUS at 23:23

## 2023-01-01 RX ADMIN — TRAZODONE HYDROCHLORIDE 50 MG: 50 TABLET ORAL at 20:00

## 2023-01-01 RX ADMIN — METFORMIN ER 500 MG 500 MG: 500 TABLET ORAL at 17:23

## 2023-01-01 RX ADMIN — INSULIN ASPART 2 UNITS: 100 INJECTION, SOLUTION INTRAVENOUS; SUBCUTANEOUS at 08:08

## 2023-01-01 RX ADMIN — METFORMIN HYDROCHLORIDE 750 MG: 750 TABLET, EXTENDED RELEASE ORAL at 08:41

## 2023-01-01 RX ADMIN — PIPERACILLIN AND TAZOBACTAM 3.38 G: 3; .375 INJECTION, POWDER, LYOPHILIZED, FOR SOLUTION INTRAVENOUS at 05:59

## 2023-01-01 RX ADMIN — OLOPATADINE HYDROCHLORIDE 1 DROP: 1 SOLUTION OPHTHALMIC at 09:09

## 2023-01-01 RX ADMIN — RISPERIDONE 0.5 MG: 0.5 TABLET, FILM COATED ORAL at 08:04

## 2023-01-01 RX ADMIN — METHOCARBAMOL 500 MG: 500 TABLET, FILM COATED ORAL at 06:22

## 2023-01-01 RX ADMIN — DICLOFENAC SODIUM 2 G: 10 GEL TOPICAL at 21:46

## 2023-01-01 RX ADMIN — ATORVASTATIN CALCIUM 80 MG: 40 TABLET, FILM COATED ORAL at 20:54

## 2023-01-01 RX ADMIN — MULTIPLE VITAMINS W/ MINERALS TAB 1 TABLET: TAB at 08:49

## 2023-01-01 RX ADMIN — MIRTAZAPINE 15 MG: 15 TABLET, FILM COATED ORAL at 20:53

## 2023-01-01 RX ADMIN — VANCOMYCIN HYDROCHLORIDE 1250 MG: 5 INJECTION, POWDER, LYOPHILIZED, FOR SOLUTION INTRAVENOUS at 16:29

## 2023-01-01 RX ADMIN — TRAZODONE HYDROCHLORIDE 50 MG: 50 TABLET ORAL at 21:08

## 2023-01-01 RX ADMIN — POLYVINYL ALCOHOL 1 DROP: 14 SOLUTION/ DROPS OPHTHALMIC at 08:19

## 2023-01-01 RX ADMIN — LIRAGLUTIDE 1.2 MG: 6 INJECTION SUBCUTANEOUS at 11:11

## 2023-01-01 RX ADMIN — ACETAMINOPHEN 650 MG: 325 TABLET ORAL at 17:11

## 2023-01-01 RX ADMIN — AMOXICILLIN AND CLAVULANATE POTASSIUM 1 TABLET: 875; 125 TABLET, FILM COATED ORAL at 09:23

## 2023-01-01 RX ADMIN — METOPROLOL TARTRATE 50 MG: 25 TABLET, FILM COATED ORAL at 22:53

## 2023-01-01 RX ADMIN — POTASSIUM CHLORIDE: 2 INJECTION, SOLUTION, CONCENTRATE INTRAVENOUS at 00:37

## 2023-01-01 RX ADMIN — LIRAGLUTIDE 1.2 MG: 6 INJECTION SUBCUTANEOUS at 08:00

## 2023-01-01 RX ADMIN — MIRTAZAPINE 15 MG: 15 TABLET, FILM COATED ORAL at 21:24

## 2023-01-01 RX ADMIN — INSULIN ASPART 1 UNITS: 100 INJECTION, SOLUTION INTRAVENOUS; SUBCUTANEOUS at 12:47

## 2023-01-01 RX ADMIN — POTASSIUM CHLORIDE 10 MEQ: 7.46 INJECTION, SOLUTION INTRAVENOUS at 09:05

## 2023-01-01 RX ADMIN — ENOXAPARIN SODIUM 30 MG: 30 INJECTION SUBCUTANEOUS at 17:17

## 2023-01-01 RX ADMIN — LATANOPROST 1 DROP: 50 SOLUTION/ DROPS OPHTHALMIC at 21:32

## 2023-01-01 RX ADMIN — PIPERACILLIN AND TAZOBACTAM 3.38 G: 3; .375 INJECTION, POWDER, LYOPHILIZED, FOR SOLUTION INTRAVENOUS at 12:34

## 2023-01-01 RX ADMIN — ATORVASTATIN CALCIUM 80 MG: 40 TABLET, FILM COATED ORAL at 22:53

## 2023-01-01 RX ADMIN — DICLOFENAC SODIUM 2 G: 10 GEL TOPICAL at 08:31

## 2023-01-01 RX ADMIN — POTASSIUM CHLORIDE: 2 INJECTION, SOLUTION, CONCENTRATE INTRAVENOUS at 15:43

## 2023-01-01 RX ADMIN — INSULIN ASPART 1 UNITS: 100 INJECTION, SOLUTION INTRAVENOUS; SUBCUTANEOUS at 18:23

## 2023-01-01 RX ADMIN — OXYCODONE HYDROCHLORIDE 2.5 MG: 5 TABLET ORAL at 14:51

## 2023-01-01 RX ADMIN — Medication 0.2 MCG/KG/MIN: at 06:55

## 2023-01-01 RX ADMIN — SODIUM CHLORIDE, POTASSIUM CHLORIDE, SODIUM LACTATE AND CALCIUM CHLORIDE 1000 ML: 600; 310; 30; 20 INJECTION, SOLUTION INTRAVENOUS at 00:13

## 2023-01-01 RX ADMIN — METOPROLOL TARTRATE 50 MG: 25 TABLET, FILM COATED ORAL at 20:13

## 2023-01-01 RX ADMIN — METOPROLOL TARTRATE 100 MG: 25 TABLET, FILM COATED ORAL at 21:12

## 2023-01-01 RX ADMIN — INSULIN ASPART 1 UNITS: 100 INJECTION, SOLUTION INTRAVENOUS; SUBCUTANEOUS at 04:51

## 2023-01-01 RX ADMIN — Medication 0.6 MCG/KG/MIN: at 20:53

## 2023-01-01 RX ADMIN — DULOXETINE HYDROCHLORIDE 90 MG: 30 CAPSULE, DELAYED RELEASE ORAL at 08:48

## 2023-01-01 RX ADMIN — AMOXICILLIN AND CLAVULANATE POTASSIUM 1 TABLET: 875; 125 TABLET, FILM COATED ORAL at 20:11

## 2023-01-01 RX ADMIN — INSULIN GLARGINE 20 UNITS: 100 INJECTION, SOLUTION SUBCUTANEOUS at 08:07

## 2023-01-01 RX ADMIN — THIAMINE HCL TAB 100 MG 100 MG: 100 TAB at 09:12

## 2023-01-01 RX ADMIN — LATANOPROST 1 DROP: 50 SOLUTION/ DROPS OPHTHALMIC at 21:19

## 2023-01-01 RX ADMIN — OLOPATADINE HYDROCHLORIDE 1 DROP: 1 SOLUTION OPHTHALMIC at 08:00

## 2023-01-01 RX ADMIN — OLOPATADINE HYDROCHLORIDE 1 DROP: 1 SOLUTION OPHTHALMIC at 08:17

## 2023-01-01 RX ADMIN — THIAMINE HCL TAB 100 MG 100 MG: 100 TAB at 09:09

## 2023-01-01 RX ADMIN — AMOXICILLIN AND CLAVULANATE POTASSIUM 1 TABLET: 875; 125 TABLET, FILM COATED ORAL at 19:36

## 2023-01-01 RX ADMIN — INSULIN ASPART 1 UNITS: 100 INJECTION, SOLUTION INTRAVENOUS; SUBCUTANEOUS at 00:59

## 2023-01-01 RX ADMIN — PHENYLEPHRINE HYDROCHLORIDE 100 MCG: 10 INJECTION INTRAVENOUS at 21:01

## 2023-01-01 RX ADMIN — MULTIPLE VITAMINS W/ MINERALS TAB 1 TABLET: TAB at 17:07

## 2023-01-01 RX ADMIN — INSULIN ASPART 1 UNITS: 100 INJECTION, SOLUTION INTRAVENOUS; SUBCUTANEOUS at 09:46

## 2023-01-01 RX ADMIN — INSULIN ASPART 1 UNITS: 100 INJECTION, SOLUTION INTRAVENOUS; SUBCUTANEOUS at 11:14

## 2023-01-01 RX ADMIN — MAGNESIUM SULFATE HEPTAHYDRATE 4 G: 80 INJECTION, SOLUTION INTRAVENOUS at 09:31

## 2023-01-01 RX ADMIN — PANTOPRAZOLE SODIUM 40 MG: 40 TABLET, DELAYED RELEASE ORAL at 16:44

## 2023-01-01 RX ADMIN — SODIUM CHLORIDE, POTASSIUM CHLORIDE, SODIUM LACTATE AND CALCIUM CHLORIDE 1000 ML: 600; 310; 30; 20 INJECTION, SOLUTION INTRAVENOUS at 19:28

## 2023-01-01 RX ADMIN — DORZOLAMIDE HYDROCHLORIDE AND TIMOLOL MALEATE 1 DROP: 20; 5 SOLUTION/ DROPS OPHTHALMIC at 08:48

## 2023-01-01 RX ADMIN — PIPERACILLIN AND TAZOBACTAM 3.38 G: 3; .375 INJECTION, POWDER, LYOPHILIZED, FOR SOLUTION INTRAVENOUS at 21:59

## 2023-01-01 RX ADMIN — METHOCARBAMOL 500 MG: 500 TABLET, FILM COATED ORAL at 13:53

## 2023-01-01 RX ADMIN — OXYCODONE HYDROCHLORIDE 2.5 MG: 5 TABLET ORAL at 17:19

## 2023-01-01 RX ADMIN — DULOXETINE HYDROCHLORIDE 90 MG: 30 CAPSULE, DELAYED RELEASE ORAL at 09:05

## 2023-01-01 RX ADMIN — PANTOPRAZOLE SODIUM 40 MG: 40 TABLET, DELAYED RELEASE ORAL at 16:58

## 2023-01-01 RX ADMIN — DICLOFENAC SODIUM 2 G: 10 GEL TOPICAL at 20:41

## 2023-01-01 RX ADMIN — Medication 1 PACKET: at 17:56

## 2023-01-01 RX ADMIN — OXYCODONE HYDROCHLORIDE 2.5 MG: 5 TABLET ORAL at 05:42

## 2023-01-01 RX ADMIN — HYDROMORPHONE HYDROCHLORIDE 0.2 MG: 0.2 INJECTION, SOLUTION INTRAMUSCULAR; INTRAVENOUS; SUBCUTANEOUS at 22:30

## 2023-01-01 RX ADMIN — OXYCODONE HYDROCHLORIDE 2.5 MG: 5 TABLET ORAL at 13:18

## 2023-01-01 RX ADMIN — HALOPERIDOL LACTATE 2 MG: 5 INJECTION, SOLUTION INTRAMUSCULAR at 01:59

## 2023-01-01 RX ADMIN — DICLOFENAC SODIUM 2 G: 10 GEL TOPICAL at 09:27

## 2023-01-01 RX ADMIN — LIRAGLUTIDE 1.2 MG: 6 INJECTION SUBCUTANEOUS at 08:04

## 2023-01-01 RX ADMIN — POTASSIUM CHLORIDE 10 MEQ: 7.46 INJECTION, SOLUTION INTRAVENOUS at 10:10

## 2023-01-01 RX ADMIN — INSULIN ASPART 2 UNITS: 100 INJECTION, SOLUTION INTRAVENOUS; SUBCUTANEOUS at 17:14

## 2023-01-01 RX ADMIN — DULOXETINE HYDROCHLORIDE 90 MG: 60 CAPSULE, DELAYED RELEASE PELLETS ORAL at 09:11

## 2023-01-01 RX ADMIN — PIPERACILLIN AND TAZOBACTAM 3.38 G: 3; .375 INJECTION, POWDER, LYOPHILIZED, FOR SOLUTION INTRAVENOUS at 04:45

## 2023-01-01 RX ADMIN — POTASSIUM CHLORIDE 20 MEQ: 1.5 POWDER, FOR SOLUTION ORAL at 09:55

## 2023-01-01 RX ADMIN — DULOXETINE HYDROCHLORIDE 90 MG: 30 CAPSULE, DELAYED RELEASE ORAL at 08:15

## 2023-01-01 RX ADMIN — INSULIN ASPART 4 UNITS: 100 INJECTION, SOLUTION INTRAVENOUS; SUBCUTANEOUS at 05:39

## 2023-01-01 RX ADMIN — OXYCODONE HYDROCHLORIDE 2.5 MG: 5 TABLET ORAL at 20:01

## 2023-01-01 RX ADMIN — PANTOPRAZOLE SODIUM 40 MG: 40 TABLET, DELAYED RELEASE ORAL at 05:42

## 2023-01-01 RX ADMIN — LATANOPROST 1 DROP: 50 SOLUTION/ DROPS OPHTHALMIC at 21:24

## 2023-01-01 RX ADMIN — OXYCODONE HYDROCHLORIDE 2.5 MG: 5 TABLET ORAL at 11:14

## 2023-01-01 RX ADMIN — METOPROLOL TARTRATE 50 MG: 25 TABLET, FILM COATED ORAL at 08:47

## 2023-01-01 RX ADMIN — THIAMINE HCL TAB 100 MG 100 MG: 100 TAB at 10:03

## 2023-01-01 RX ADMIN — PANTOPRAZOLE SODIUM 40 MG: 40 TABLET, DELAYED RELEASE ORAL at 06:47

## 2023-01-01 RX ADMIN — DICLOFENAC SODIUM 2 G: 10 GEL TOPICAL at 13:22

## 2023-01-01 RX ADMIN — MAGNESIUM SULFATE HEPTAHYDRATE 4 G: 80 INJECTION, SOLUTION INTRAVENOUS at 15:16

## 2023-01-01 RX ADMIN — DICLOFENAC SODIUM 2 G: 10 GEL TOPICAL at 21:08

## 2023-01-01 RX ADMIN — LATANOPROST 1 DROP: 50 SOLUTION/ DROPS OPHTHALMIC at 21:18

## 2023-01-01 RX ADMIN — Medication 40 MG: at 16:19

## 2023-01-01 RX ADMIN — ATORVASTATIN CALCIUM 80 MG: 40 TABLET, FILM COATED ORAL at 08:55

## 2023-01-01 RX ADMIN — POLYVINYL ALCOHOL 1 DROP: 14 SOLUTION/ DROPS OPHTHALMIC at 09:18

## 2023-01-01 RX ADMIN — DORZOLAMIDE HYDROCHLORIDE AND TIMOLOL MALEATE 1 DROP: 20; 5 SOLUTION/ DROPS OPHTHALMIC at 09:27

## 2023-01-01 RX ADMIN — SENNOSIDES AND DOCUSATE SODIUM 2 TABLET: 50; 8.6 TABLET ORAL at 14:51

## 2023-01-01 RX ADMIN — DULOXETINE HYDROCHLORIDE 90 MG: 60 CAPSULE, DELAYED RELEASE PELLETS ORAL at 09:54

## 2023-01-01 RX ADMIN — ENOXAPARIN SODIUM 40 MG: 40 INJECTION SUBCUTANEOUS at 08:05

## 2023-01-01 RX ADMIN — SENNOSIDES AND DOCUSATE SODIUM 1 TABLET: 50; 8.6 TABLET ORAL at 09:25

## 2023-01-01 RX ADMIN — POLYVINYL ALCOHOL 1 DROP: 14 SOLUTION/ DROPS OPHTHALMIC at 22:05

## 2023-01-01 RX ADMIN — CHLORTHALIDONE 25 MG: 25 TABLET ORAL at 08:17

## 2023-01-01 RX ADMIN — THIAMINE HCL TAB 100 MG 100 MG: 100 TAB at 08:43

## 2023-01-01 RX ADMIN — PIPERACILLIN AND TAZOBACTAM 3.38 G: 3; .375 INJECTION, POWDER, LYOPHILIZED, FOR SOLUTION INTRAVENOUS at 12:56

## 2023-01-01 RX ADMIN — PANTOPRAZOLE SODIUM 40 MG: 40 TABLET, DELAYED RELEASE ORAL at 16:40

## 2023-01-01 RX ADMIN — VANCOMYCIN HYDROCHLORIDE 1250 MG: 5 INJECTION, POWDER, LYOPHILIZED, FOR SOLUTION INTRAVENOUS at 17:36

## 2023-01-01 RX ADMIN — DORZOLAMIDE HYDROCHLORIDE AND TIMOLOL MALEATE 1 DROP: 20; 5 SOLUTION/ DROPS OPHTHALMIC at 20:31

## 2023-01-01 RX ADMIN — DICLOFENAC SODIUM 2 G: 10 GEL TOPICAL at 09:35

## 2023-01-01 RX ADMIN — METOPROLOL TARTRATE 50 MG: 25 TABLET, FILM COATED ORAL at 09:54

## 2023-01-01 RX ADMIN — DORZOLAMIDE HYDROCHLORIDE AND TIMOLOL MALEATE 1 DROP: 20; 5 SOLUTION/ DROPS OPHTHALMIC at 20:41

## 2023-01-01 RX ADMIN — DORZOLAMIDE HYDROCHLORIDE AND TIMOLOL MALEATE 1 DROP: 20; 5 SOLUTION/ DROPS OPHTHALMIC at 20:21

## 2023-01-01 RX ADMIN — METHOCARBAMOL 500 MG: 500 TABLET, FILM COATED ORAL at 16:44

## 2023-01-01 RX ADMIN — Medication 1 PACKET: at 09:37

## 2023-01-01 RX ADMIN — DICLOFENAC SODIUM 2 G: 10 GEL TOPICAL at 09:05

## 2023-01-01 RX ADMIN — ATORVASTATIN CALCIUM 80 MG: 40 TABLET, FILM COATED ORAL at 21:13

## 2023-01-01 RX ADMIN — Medication 1 PACKET: at 16:19

## 2023-01-01 RX ADMIN — TRAZODONE HYDROCHLORIDE 50 MG: 50 TABLET ORAL at 20:39

## 2023-01-01 RX ADMIN — DORZOLAMIDE HYDROCHLORIDE AND TIMOLOL MALEATE 1 DROP: 20; 5 SOLUTION/ DROPS OPHTHALMIC at 09:04

## 2023-01-01 RX ADMIN — PIPERACILLIN AND TAZOBACTAM 3.38 G: 3; .375 INJECTION, POWDER, LYOPHILIZED, FOR SOLUTION INTRAVENOUS at 13:21

## 2023-01-01 RX ADMIN — DICLOFENAC SODIUM 2 G: 10 GEL TOPICAL at 21:24

## 2023-01-01 RX ADMIN — POLYVINYL ALCOHOL 1 DROP: 14 SOLUTION/ DROPS OPHTHALMIC at 09:03

## 2023-01-01 RX ADMIN — DICLOFENAC SODIUM 2 G: 10 GEL TOPICAL at 08:48

## 2023-01-01 RX ADMIN — OXYCODONE HYDROCHLORIDE 2.5 MG: 5 TABLET ORAL at 21:59

## 2023-01-01 RX ADMIN — INSULIN ASPART 3 UNITS: 100 INJECTION, SOLUTION INTRAVENOUS; SUBCUTANEOUS at 13:34

## 2023-01-01 RX ADMIN — THIAMINE HCL TAB 100 MG 100 MG: 100 TAB at 09:34

## 2023-01-01 RX ADMIN — ASPIRIN 81 MG CHEWABLE TABLET 81 MG: 81 TABLET CHEWABLE at 08:08

## 2023-01-01 RX ADMIN — POLYVINYL ALCOHOL 1 DROP: 14 SOLUTION/ DROPS OPHTHALMIC at 08:16

## 2023-01-01 RX ADMIN — DICLOFENAC SODIUM 2 G: 10 GEL TOPICAL at 18:33

## 2023-01-01 RX ADMIN — PIPERACILLIN AND TAZOBACTAM 3.38 G: 3; .375 INJECTION, POWDER, LYOPHILIZED, FOR SOLUTION INTRAVENOUS at 05:54

## 2023-01-01 RX ADMIN — THIAMINE HCL TAB 100 MG 100 MG: 100 TAB at 11:09

## 2023-01-01 RX ADMIN — PIPERACILLIN AND TAZOBACTAM 3.38 G: 3; .375 INJECTION, POWDER, LYOPHILIZED, FOR SOLUTION INTRAVENOUS at 14:12

## 2023-01-01 RX ADMIN — TAZOBACTAM SODIUM AND PIPERACILLIN SODIUM 2.25 G: 250; 2 INJECTION, SOLUTION INTRAVENOUS at 10:55

## 2023-01-01 RX ADMIN — POLYVINYL ALCOHOL 1 DROP: 14 SOLUTION/ DROPS OPHTHALMIC at 13:17

## 2023-01-01 RX ADMIN — DORZOLAMIDE HYDROCHLORIDE AND TIMOLOL MALEATE 1 DROP: 20; 5 SOLUTION/ DROPS OPHTHALMIC at 09:03

## 2023-01-01 RX ADMIN — PANTOPRAZOLE SODIUM 40 MG: 40 TABLET, DELAYED RELEASE ORAL at 08:17

## 2023-01-01 RX ADMIN — PHENYLEPHRINE HYDROCHLORIDE 100 MCG: 10 INJECTION INTRAVENOUS at 20:11

## 2023-01-01 RX ADMIN — TAZOBACTAM SODIUM AND PIPERACILLIN SODIUM 3.38 G: 375; 3 INJECTION, SOLUTION INTRAVENOUS at 08:53

## 2023-01-01 RX ADMIN — CARBOXYMETHYLCELLULOSE SODIUM 1 DROP: 0.5 SOLUTION/ DROPS OPHTHALMIC at 09:21

## 2023-01-01 RX ADMIN — DORZOLAMIDE HYDROCHLORIDE AND TIMOLOL MALEATE 1 DROP: 20; 5 SOLUTION/ DROPS OPHTHALMIC at 20:39

## 2023-01-01 RX ADMIN — Medication 1 PACKET: at 08:10

## 2023-01-01 RX ADMIN — DORZOLAMIDE HYDROCHLORIDE AND TIMOLOL MALEATE 1 DROP: 20; 5 SOLUTION/ DROPS OPHTHALMIC at 09:09

## 2023-01-01 RX ADMIN — DORZOLAMIDE HYDROCHLORIDE AND TIMOLOL MALEATE 1 DROP: 20; 5 SOLUTION/ DROPS OPHTHALMIC at 21:19

## 2023-01-01 RX ADMIN — PIPERACILLIN AND TAZOBACTAM 3.38 G: 3; .375 INJECTION, POWDER, LYOPHILIZED, FOR SOLUTION INTRAVENOUS at 04:36

## 2023-01-01 RX ADMIN — POLYVINYL ALCOHOL 1 DROP: 14 SOLUTION/ DROPS OPHTHALMIC at 21:08

## 2023-01-01 RX ADMIN — PANTOPRAZOLE SODIUM 40 MG: 40 TABLET, DELAYED RELEASE ORAL at 06:33

## 2023-01-01 RX ADMIN — POTASSIUM CHLORIDE 10 MEQ: 7.46 INJECTION, SOLUTION INTRAVENOUS at 11:27

## 2023-01-01 RX ADMIN — LATANOPROST 1 DROP: 50 SOLUTION/ DROPS OPHTHALMIC at 21:42

## 2023-01-01 RX ADMIN — DICLOFENAC SODIUM 2 G: 10 GEL TOPICAL at 16:20

## 2023-01-01 RX ADMIN — LATANOPROST 1 DROP: 50 SOLUTION/ DROPS OPHTHALMIC at 20:06

## 2023-01-01 RX ADMIN — ROCURONIUM BROMIDE 40 MG: 50 INJECTION, SOLUTION INTRAVENOUS at 20:12

## 2023-01-01 RX ADMIN — DORZOLAMIDE HYDROCHLORIDE AND TIMOLOL MALEATE 1 DROP: 22.3; 6.8 SOLUTION/ DROPS OPHTHALMIC at 08:29

## 2023-01-01 RX ADMIN — VASOPRESSIN 2.4 UNITS/HR: 20 INJECTION, SOLUTION INTRAMUSCULAR; SUBCUTANEOUS at 07:14

## 2023-01-01 RX ADMIN — DORZOLAMIDE HYDROCHLORIDE AND TIMOLOL MALEATE 1 DROP: 20; 5 SOLUTION/ DROPS OPHTHALMIC at 21:23

## 2023-01-01 RX ADMIN — OLOPATADINE HYDROCHLORIDE 1 DROP: 1 SOLUTION OPHTHALMIC at 07:59

## 2023-01-01 RX ADMIN — POTASSIUM CHLORIDE 20 MEQ: 1.5 POWDER, FOR SOLUTION ORAL at 00:46

## 2023-01-01 RX ADMIN — ATORVASTATIN CALCIUM 80 MG: 40 TABLET, FILM COATED ORAL at 22:09

## 2023-01-01 RX ADMIN — ASPIRIN 81 MG: 81 TABLET, CHEWABLE ORAL at 08:17

## 2023-01-01 RX ADMIN — METHOCARBAMOL 500 MG: 500 TABLET, FILM COATED ORAL at 11:04

## 2023-01-01 RX ADMIN — PIPERACILLIN AND TAZOBACTAM 3.38 G: 3; .375 INJECTION, POWDER, LYOPHILIZED, FOR SOLUTION INTRAVENOUS at 22:19

## 2023-01-01 RX ADMIN — TRAZODONE HYDROCHLORIDE 50 MG: 50 TABLET ORAL at 21:49

## 2023-01-01 RX ADMIN — ENOXAPARIN SODIUM 30 MG: 30 INJECTION SUBCUTANEOUS at 17:01

## 2023-01-01 RX ADMIN — Medication 0.52 MCG/KG/MIN: at 22:49

## 2023-01-01 RX ADMIN — Medication 40 MG: at 16:55

## 2023-01-01 RX ADMIN — METOPROLOL TARTRATE 100 MG: 25 TABLET, FILM COATED ORAL at 09:02

## 2023-01-01 RX ADMIN — POLYVINYL ALCOHOL 1 DROP: 14 SOLUTION/ DROPS OPHTHALMIC at 13:48

## 2023-01-01 RX ADMIN — DORZOLAMIDE HYDROCHLORIDE AND TIMOLOL MALEATE 1 DROP: 20; 5 SOLUTION/ DROPS OPHTHALMIC at 08:31

## 2023-01-01 RX ADMIN — TRAZODONE HYDROCHLORIDE 50 MG: 50 TABLET ORAL at 20:29

## 2023-01-01 RX ADMIN — ATORVASTATIN CALCIUM 80 MG: 40 TABLET, FILM COATED ORAL at 20:22

## 2023-01-01 RX ADMIN — OXYCODONE HYDROCHLORIDE 2.5 MG: 5 TABLET ORAL at 01:42

## 2023-01-01 RX ADMIN — METOPROLOL TARTRATE 50 MG: 25 TABLET, FILM COATED ORAL at 21:30

## 2023-01-01 RX ADMIN — DEXTROSE MONOHYDRATE AND SODIUM CHLORIDE: 5; .45 INJECTION, SOLUTION INTRAVENOUS at 09:02

## 2023-01-01 RX ADMIN — DULOXETINE HYDROCHLORIDE 90 MG: 60 CAPSULE, DELAYED RELEASE PELLETS ORAL at 09:34

## 2023-01-01 RX ADMIN — DEXTROSE MONOHYDRATE AND SODIUM CHLORIDE: 5; .45 INJECTION, SOLUTION INTRAVENOUS at 00:59

## 2023-01-01 RX ADMIN — DICLOFENAC SODIUM 2 G: 10 GEL TOPICAL at 20:56

## 2023-01-01 RX ADMIN — MAGNESIUM SULFATE HEPTAHYDRATE 4 G: 80 INJECTION, SOLUTION INTRAVENOUS at 07:59

## 2023-01-01 RX ADMIN — DORZOLAMIDE HYDROCHLORIDE AND TIMOLOL MALEATE 1 DROP: 22.3; 6.8 SOLUTION/ DROPS OPHTHALMIC at 20:17

## 2023-01-01 RX ADMIN — LATANOPROST 1 DROP: 50 SOLUTION/ DROPS OPHTHALMIC at 20:31

## 2023-01-01 RX ADMIN — POTASSIUM CHLORIDE 20 MEQ: 20 SOLUTION ORAL at 12:43

## 2023-01-01 RX ADMIN — MAGNESIUM SULFATE HEPTAHYDRATE 4 G: 80 INJECTION, SOLUTION INTRAVENOUS at 09:03

## 2023-01-01 RX ADMIN — METHOCARBAMOL 500 MG: 500 TABLET, FILM COATED ORAL at 21:57

## 2023-01-01 RX ADMIN — PIPERACILLIN AND TAZOBACTAM 3.38 G: 3; .375 INJECTION, POWDER, LYOPHILIZED, FOR SOLUTION INTRAVENOUS at 20:42

## 2023-01-01 RX ADMIN — PIPERACILLIN AND TAZOBACTAM 3.38 G: 3; .375 INJECTION, POWDER, LYOPHILIZED, FOR SOLUTION INTRAVENOUS at 20:46

## 2023-01-01 RX ADMIN — POTASSIUM CHLORIDE 10 MEQ: 7.46 INJECTION, SOLUTION INTRAVENOUS at 13:24

## 2023-01-01 RX ADMIN — VANCOMYCIN HYDROCHLORIDE 1250 MG: 5 INJECTION, POWDER, LYOPHILIZED, FOR SOLUTION INTRAVENOUS at 16:39

## 2023-01-01 RX ADMIN — PIPERACILLIN AND TAZOBACTAM 3.38 G: 3; .375 INJECTION, POWDER, LYOPHILIZED, FOR SOLUTION INTRAVENOUS at 05:26

## 2023-01-01 RX ADMIN — DEXTROSE MONOHYDRATE AND SODIUM CHLORIDE: 5; .45 INJECTION, SOLUTION INTRAVENOUS at 09:55

## 2023-01-01 RX ADMIN — METFORMIN HYDROCHLORIDE 750 MG: 750 TABLET, EXTENDED RELEASE ORAL at 09:19

## 2023-01-01 RX ADMIN — SODIUM CHLORIDE, POTASSIUM CHLORIDE, SODIUM LACTATE AND CALCIUM CHLORIDE 1000 ML: 600; 310; 30; 20 INJECTION, SOLUTION INTRAVENOUS at 21:23

## 2023-01-01 RX ADMIN — PIPERACILLIN AND TAZOBACTAM 3.38 G: 3; .375 INJECTION, POWDER, LYOPHILIZED, FOR SOLUTION INTRAVENOUS at 12:15

## 2023-01-01 RX ADMIN — POTASSIUM CHLORIDE 10 MEQ: 7.46 INJECTION, SOLUTION INTRAVENOUS at 12:01

## 2023-01-01 RX ADMIN — PIPERACILLIN AND TAZOBACTAM 3.38 G: 3; .375 INJECTION, POWDER, LYOPHILIZED, FOR SOLUTION INTRAVENOUS at 21:25

## 2023-01-01 RX ADMIN — MAGNESIUM SULFATE HEPTAHYDRATE 4 G: 80 INJECTION, SOLUTION INTRAVENOUS at 09:37

## 2023-01-01 RX ADMIN — PANTOPRAZOLE SODIUM 40 MG: 40 TABLET, DELAYED RELEASE ORAL at 09:41

## 2023-01-01 RX ADMIN — INSULIN ASPART 2 UNITS: 100 INJECTION, SOLUTION INTRAVENOUS; SUBCUTANEOUS at 00:14

## 2023-01-01 RX ADMIN — PANTOPRAZOLE SODIUM 40 MG: 40 TABLET, DELAYED RELEASE ORAL at 18:37

## 2023-01-01 RX ADMIN — OXYCODONE HYDROCHLORIDE 2.5 MG: 5 TABLET ORAL at 04:14

## 2023-01-01 RX ADMIN — ASPIRIN 81 MG CHEWABLE TABLET 81 MG: 81 TABLET CHEWABLE at 08:49

## 2023-01-01 RX ADMIN — ATORVASTATIN CALCIUM 80 MG: 40 TABLET, FILM COATED ORAL at 21:24

## 2023-01-01 RX ADMIN — POLYVINYL ALCOHOL 1 DROP: 14 SOLUTION/ DROPS OPHTHALMIC at 09:09

## 2023-01-01 RX ADMIN — SODIUM CHLORIDE, POTASSIUM CHLORIDE, SODIUM LACTATE AND CALCIUM CHLORIDE 2000 ML: 600; 310; 30; 20 INJECTION, SOLUTION INTRAVENOUS at 15:19

## 2023-01-01 RX ADMIN — MIRTAZAPINE 15 MG: 15 TABLET, FILM COATED ORAL at 20:59

## 2023-01-01 RX ADMIN — ASPIRIN 81 MG: 81 TABLET, CHEWABLE ORAL at 08:28

## 2023-01-01 RX ADMIN — TRAZODONE HYDROCHLORIDE 50 MG: 50 TABLET ORAL at 20:01

## 2023-01-01 RX ADMIN — INSULIN ASPART 1 UNITS: 100 INJECTION, SOLUTION INTRAVENOUS; SUBCUTANEOUS at 13:19

## 2023-01-01 RX ADMIN — ACETAMINOPHEN 650 MG: 325 TABLET ORAL at 17:15

## 2023-01-01 RX ADMIN — PANTOPRAZOLE SODIUM 40 MG: 40 TABLET, DELAYED RELEASE ORAL at 06:57

## 2023-01-01 RX ADMIN — FOLIC ACID 1 MG: 1 TABLET ORAL at 12:22

## 2023-01-01 RX ADMIN — ACETAMINOPHEN 650 MG: 325 SOLUTION ORAL at 17:35

## 2023-01-01 RX ADMIN — DICLOFENAC SODIUM 2 G: 10 GEL TOPICAL at 17:36

## 2023-01-01 RX ADMIN — Medication 1 PACKET: at 08:41

## 2023-01-01 RX ADMIN — PIPERACILLIN AND TAZOBACTAM 3.38 G: 3; .375 INJECTION, POWDER, LYOPHILIZED, FOR SOLUTION INTRAVENOUS at 05:45

## 2023-01-01 RX ADMIN — OLOPATADINE HYDROCHLORIDE 1 DROP: 1 SOLUTION OPHTHALMIC at 08:42

## 2023-01-01 RX ADMIN — DICLOFENAC SODIUM 2 G: 10 GEL TOPICAL at 12:14

## 2023-01-01 RX ADMIN — METHOCARBAMOL 500 MG: 500 TABLET, FILM COATED ORAL at 21:36

## 2023-01-01 RX ADMIN — DICLOFENAC SODIUM 2 G: 10 GEL TOPICAL at 12:08

## 2023-01-01 RX ADMIN — TAZOBACTAM SODIUM AND PIPERACILLIN SODIUM 3.38 G: 375; 3 INJECTION, SOLUTION INTRAVENOUS at 22:09

## 2023-01-01 RX ADMIN — POLYVINYL ALCOHOL 1 DROP: 14 SOLUTION/ DROPS OPHTHALMIC at 09:41

## 2023-01-01 RX ADMIN — PANTOPRAZOLE SODIUM 40 MG: 40 TABLET, DELAYED RELEASE ORAL at 06:00

## 2023-01-01 RX ADMIN — METOPROLOL TARTRATE 50 MG: 25 TABLET, FILM COATED ORAL at 09:27

## 2023-01-01 RX ADMIN — POLYVINYL ALCOHOL 1 DROP: 14 SOLUTION/ DROPS OPHTHALMIC at 20:53

## 2023-01-01 RX ADMIN — METHOCARBAMOL 500 MG: 500 TABLET, FILM COATED ORAL at 21:49

## 2023-01-01 RX ADMIN — Medication 40 MG: at 16:15

## 2023-01-01 RX ADMIN — DEXTROSE MONOHYDRATE AND SODIUM CHLORIDE: 5; .45 INJECTION, SOLUTION INTRAVENOUS at 19:11

## 2023-01-01 RX ADMIN — OLOPATADINE HYDROCHLORIDE 1 DROP: 1 SOLUTION OPHTHALMIC at 09:05

## 2023-01-01 RX ADMIN — DORZOLAMIDE HYDROCHLORIDE AND TIMOLOL MALEATE 1 DROP: 20; 5 SOLUTION/ DROPS OPHTHALMIC at 21:56

## 2023-01-01 RX ADMIN — METFORMIN HYDROCHLORIDE 750 MG: 750 TABLET, EXTENDED RELEASE ORAL at 18:56

## 2023-01-01 RX ADMIN — POLYVINYL ALCOHOL 1 DROP: 14 SOLUTION/ DROPS OPHTHALMIC at 20:26

## 2023-01-01 RX ADMIN — METOPROLOL TARTRATE 50 MG: 25 TABLET, FILM COATED ORAL at 20:58

## 2023-01-01 RX ADMIN — TRAZODONE HYDROCHLORIDE 50 MG: 50 TABLET ORAL at 21:45

## 2023-01-01 RX ADMIN — POLYVINYL ALCOHOL 1 DROP: 14 SOLUTION/ DROPS OPHTHALMIC at 13:53

## 2023-01-01 RX ADMIN — ACETAMINOPHEN 650 MG: 325 TABLET ORAL at 12:23

## 2023-01-01 RX ADMIN — DORZOLAMIDE HYDROCHLORIDE AND TIMOLOL MALEATE 1 DROP: 20; 5 SOLUTION/ DROPS OPHTHALMIC at 08:10

## 2023-01-01 RX ADMIN — THIAMINE HCL TAB 100 MG 100 MG: 100 TAB at 09:31

## 2023-01-01 RX ADMIN — MIRTAZAPINE 15 MG: 15 TABLET, FILM COATED ORAL at 20:21

## 2023-01-01 RX ADMIN — POLYVINYL ALCOHOL 1 DROP: 14 SOLUTION/ DROPS OPHTHALMIC at 09:44

## 2023-01-01 RX ADMIN — PANTOPRAZOLE SODIUM 40 MG: 40 TABLET, DELAYED RELEASE ORAL at 17:27

## 2023-01-01 RX ADMIN — SODIUM CHLORIDE: 9 INJECTION, SOLUTION INTRAVENOUS at 13:19

## 2023-01-01 RX ADMIN — CARBOXYMETHYLCELLULOSE SODIUM 1 DROP: 0.5 SOLUTION/ DROPS OPHTHALMIC at 08:28

## 2023-01-01 RX ADMIN — DICLOFENAC SODIUM 2 G: 10 GEL TOPICAL at 13:21

## 2023-01-01 RX ADMIN — METOPROLOL TARTRATE 50 MG: 25 TABLET, FILM COATED ORAL at 09:35

## 2023-01-01 RX ADMIN — TAZOBACTAM SODIUM AND PIPERACILLIN SODIUM 3.38 G: 375; 3 INJECTION, SOLUTION INTRAVENOUS at 10:41

## 2023-01-01 RX ADMIN — DIATRIZOATE MEGLUMINE AND DIATRIZOATE SODIUM 120 ML: 660; 100 SOLUTION ORAL; RECTAL at 11:51

## 2023-01-01 RX ADMIN — OXYCODONE HYDROCHLORIDE 2.5 MG: 5 TABLET ORAL at 21:49

## 2023-01-01 RX ADMIN — LIDOCAINE HYDROCHLORIDE ANHYDROUS 1 ML: 10 INJECTION, SOLUTION INFILTRATION at 06:20

## 2023-01-01 RX ADMIN — DULOXETINE HYDROCHLORIDE 90 MG: 30 CAPSULE, DELAYED RELEASE ORAL at 09:46

## 2023-01-01 RX ADMIN — MORPHINE SULFATE 1 MG: 2 INJECTION, SOLUTION INTRAMUSCULAR; INTRAVENOUS at 01:49

## 2023-01-01 RX ADMIN — DICLOFENAC 2 G: 10 GEL TOPICAL at 14:31

## 2023-01-01 RX ADMIN — METHOCARBAMOL 500 MG: 500 TABLET, FILM COATED ORAL at 01:33

## 2023-01-01 RX ADMIN — PIPERACILLIN AND TAZOBACTAM 3.38 G: 3; .375 INJECTION, POWDER, LYOPHILIZED, FOR SOLUTION INTRAVENOUS at 13:19

## 2023-01-01 RX ADMIN — DORZOLAMIDE HYDROCHLORIDE AND TIMOLOL MALEATE 1 DROP: 20; 5 SOLUTION/ DROPS OPHTHALMIC at 09:45

## 2023-01-01 RX ADMIN — MIRTAZAPINE 15 MG: 15 TABLET, FILM COATED ORAL at 21:30

## 2023-01-01 RX ADMIN — PANTOPRAZOLE SODIUM 40 MG: 40 TABLET, DELAYED RELEASE ORAL at 09:47

## 2023-01-01 RX ADMIN — PIPERACILLIN AND TAZOBACTAM 3.38 G: 3; .375 INJECTION, POWDER, LYOPHILIZED, FOR SOLUTION INTRAVENOUS at 21:44

## 2023-01-01 RX ADMIN — ATORVASTATIN CALCIUM 80 MG: 40 TABLET, FILM COATED ORAL at 20:57

## 2023-01-01 RX ADMIN — LATANOPROST 1 DROP: 50 SOLUTION/ DROPS OPHTHALMIC at 20:41

## 2023-01-01 RX ADMIN — POTASSIUM & SODIUM PHOSPHATES POWDER PACK 280-160-250 MG 1 PACKET: 280-160-250 PACK at 12:06

## 2023-01-01 RX ADMIN — LIRAGLUTIDE 1.2 MG: 6 INJECTION SUBCUTANEOUS at 12:10

## 2023-01-01 RX ADMIN — PANTOPRAZOLE SODIUM 40 MG: 40 TABLET, DELAYED RELEASE ORAL at 09:35

## 2023-01-01 RX ADMIN — MIRTAZAPINE 15 MG: 15 TABLET, FILM COATED ORAL at 20:57

## 2023-01-01 RX ADMIN — ATORVASTATIN CALCIUM 80 MG: 40 TABLET, FILM COATED ORAL at 21:53

## 2023-01-01 RX ADMIN — DORZOLAMIDE HYDROCHLORIDE AND TIMOLOL MALEATE 1 DROP: 20; 5 SOLUTION/ DROPS OPHTHALMIC at 08:00

## 2023-01-01 RX ADMIN — INSULIN ASPART 3 UNITS: 100 INJECTION, SOLUTION INTRAVENOUS; SUBCUTANEOUS at 09:40

## 2023-01-01 RX ADMIN — THIAMINE HCL TAB 100 MG 100 MG: 100 TAB at 09:25

## 2023-01-01 RX ADMIN — DICLOFENAC SODIUM 2 G: 10 GEL TOPICAL at 17:20

## 2023-01-01 RX ADMIN — DULOXETINE HYDROCHLORIDE 90 MG: 30 CAPSULE, DELAYED RELEASE ORAL at 09:42

## 2023-01-01 RX ADMIN — ASPIRIN 81 MG: 81 TABLET, CHEWABLE ORAL at 09:11

## 2023-01-01 RX ADMIN — SODIUM CHLORIDE, POTASSIUM CHLORIDE, SODIUM LACTATE AND CALCIUM CHLORIDE 1000 ML: 600; 310; 30; 20 INJECTION, SOLUTION INTRAVENOUS at 08:43

## 2023-01-01 RX ADMIN — OLOPATADINE HYDROCHLORIDE 1 DROP: 1 SOLUTION OPHTHALMIC at 12:41

## 2023-01-01 RX ADMIN — DULOXETINE HYDROCHLORIDE 90 MG: 30 CAPSULE, DELAYED RELEASE ORAL at 08:09

## 2023-01-01 RX ADMIN — OLOPATADINE HYDROCHLORIDE 1 DROP: 1 SOLUTION OPHTHALMIC at 20:29

## 2023-01-01 RX ADMIN — DORZOLAMIDE HYDROCHLORIDE AND TIMOLOL MALEATE 1 DROP: 20; 5 SOLUTION/ DROPS OPHTHALMIC at 09:37

## 2023-01-01 RX ADMIN — DORZOLAMIDE HYDROCHLORIDE AND TIMOLOL MALEATE 1 DROP: 22.3; 6.8 SOLUTION/ DROPS OPHTHALMIC at 10:41

## 2023-01-01 RX ADMIN — POTASSIUM & SODIUM PHOSPHATES POWDER PACK 280-160-250 MG 1 PACKET: 280-160-250 PACK at 06:41

## 2023-01-01 RX ADMIN — PIPERACILLIN AND TAZOBACTAM 3.38 G: 3; .375 INJECTION, POWDER, LYOPHILIZED, FOR SOLUTION INTRAVENOUS at 20:38

## 2023-01-01 RX ADMIN — DULOXETINE HYDROCHLORIDE 90 MG: 30 CAPSULE, DELAYED RELEASE ORAL at 09:02

## 2023-01-01 RX ADMIN — POTASSIUM CHLORIDE FOR ORAL SOLUTION 40 MEQ: 1.5 POWDER, FOR SOLUTION ORAL at 01:10

## 2023-01-01 RX ADMIN — CLINDAMYCIN PHOSPHATE 900 MG: 900 INJECTION, SOLUTION INTRAVENOUS at 11:28

## 2023-01-01 RX ADMIN — METOPROLOL TARTRATE 50 MG: 25 TABLET, FILM COATED ORAL at 09:32

## 2023-01-01 RX ADMIN — THIAMINE HCL TAB 100 MG 100 MG: 100 TAB at 09:26

## 2023-01-01 RX ADMIN — FENTANYL CITRATE 25 MCG: 50 INJECTION, SOLUTION INTRAMUSCULAR; INTRAVENOUS at 21:41

## 2023-01-01 RX ADMIN — MAGNESIUM SULFATE HEPTAHYDRATE 4 G: 80 INJECTION, SOLUTION INTRAVENOUS at 09:40

## 2023-01-01 RX ADMIN — OXYCODONE HYDROCHLORIDE 2.5 MG: 5 TABLET ORAL at 17:35

## 2023-01-01 RX ADMIN — INSULIN ASPART 8 UNITS: 100 INJECTION, SOLUTION INTRAVENOUS; SUBCUTANEOUS at 20:00

## 2023-01-01 RX ADMIN — DICLOFENAC SODIUM 2 G: 10 GEL TOPICAL at 08:42

## 2023-01-01 RX ADMIN — PANTOPRAZOLE SODIUM 40 MG: 40 TABLET, DELAYED RELEASE ORAL at 18:06

## 2023-01-01 RX ADMIN — METHOCARBAMOL 500 MG: 500 TABLET, FILM COATED ORAL at 10:49

## 2023-01-01 RX ADMIN — DEXTROSE MONOHYDRATE 25 ML: 25 INJECTION, SOLUTION INTRAVENOUS at 18:52

## 2023-01-01 RX ADMIN — Medication 1 TABLET: at 08:40

## 2023-01-01 RX ADMIN — METOPROLOL TARTRATE 50 MG: 25 TABLET, FILM COATED ORAL at 20:54

## 2023-01-01 RX ADMIN — MIRTAZAPINE 15 MG: 15 TABLET, FILM COATED ORAL at 21:16

## 2023-01-01 RX ADMIN — DICLOFENAC SODIUM 2 G: 10 GEL TOPICAL at 17:19

## 2023-01-01 RX ADMIN — METOPROLOL TARTRATE 100 MG: 25 TABLET, FILM COATED ORAL at 08:04

## 2023-01-01 RX ADMIN — POLYVINYL ALCOHOL 1 DROP: 14 SOLUTION/ DROPS OPHTHALMIC at 20:42

## 2023-01-01 RX ADMIN — METOPROLOL TARTRATE 50 MG: 25 TABLET, FILM COATED ORAL at 20:29

## 2023-01-01 RX ADMIN — INSULIN ASPART 1 UNITS: 100 INJECTION, SOLUTION INTRAVENOUS; SUBCUTANEOUS at 16:50

## 2023-01-01 RX ADMIN — ACETAMINOPHEN 650 MG: 325 TABLET ORAL at 22:41

## 2023-01-01 RX ADMIN — TRAZODONE HYDROCHLORIDE 50 MG: 50 TABLET ORAL at 23:42

## 2023-01-01 RX ADMIN — ASPIRIN 81 MG: 81 TABLET, CHEWABLE ORAL at 08:10

## 2023-01-01 RX ADMIN — OXYCODONE HYDROCHLORIDE 2.5 MG: 5 TABLET ORAL at 18:38

## 2023-01-01 RX ADMIN — Medication 40 MG: at 16:30

## 2023-01-01 RX ADMIN — PANTOPRAZOLE SODIUM 40 MG: 40 TABLET, DELAYED RELEASE ORAL at 06:07

## 2023-01-01 RX ADMIN — INSULIN ASPART 1 UNITS: 100 INJECTION, SOLUTION INTRAVENOUS; SUBCUTANEOUS at 12:10

## 2023-01-01 RX ADMIN — METOPROLOL TARTRATE 50 MG: 25 TABLET, FILM COATED ORAL at 08:41

## 2023-01-01 RX ADMIN — INSULIN GLARGINE 20 UNITS: 100 INJECTION, SOLUTION SUBCUTANEOUS at 08:30

## 2023-01-01 RX ADMIN — METFORMIN ER 500 MG 500 MG: 500 TABLET ORAL at 09:34

## 2023-01-01 RX ADMIN — DICLOFENAC SODIUM 2 G: 10 GEL TOPICAL at 12:03

## 2023-01-01 RX ADMIN — METOPROLOL TARTRATE 50 MG: 25 TABLET, FILM COATED ORAL at 09:38

## 2023-01-01 RX ADMIN — PIPERACILLIN AND TAZOBACTAM 3.38 G: 3; .375 INJECTION, POWDER, LYOPHILIZED, FOR SOLUTION INTRAVENOUS at 04:49

## 2023-01-01 RX ADMIN — AMOXICILLIN AND CLAVULANATE POTASSIUM 1 TABLET: 875; 125 TABLET, FILM COATED ORAL at 09:34

## 2023-01-01 RX ADMIN — DICLOFENAC SODIUM 2 G: 10 GEL TOPICAL at 12:28

## 2023-01-01 RX ADMIN — METFORMIN ER 500 MG 500 MG: 500 TABLET ORAL at 17:43

## 2023-01-01 RX ADMIN — ENOXAPARIN SODIUM 30 MG: 30 INJECTION SUBCUTANEOUS at 18:06

## 2023-01-01 RX ADMIN — POLYVINYL ALCOHOL 1 DROP: 14 SOLUTION/ DROPS OPHTHALMIC at 13:20

## 2023-01-01 RX ADMIN — ACETAMINOPHEN 650 MG: 325 TABLET ORAL at 11:07

## 2023-01-01 RX ADMIN — HYDROMORPHONE HYDROCHLORIDE 0.2 MG: 0.2 INJECTION, SOLUTION INTRAMUSCULAR; INTRAVENOUS; SUBCUTANEOUS at 09:41

## 2023-01-01 RX ADMIN — PIPERACILLIN AND TAZOBACTAM 3.38 G: 3; .375 INJECTION, POWDER, LYOPHILIZED, FOR SOLUTION INTRAVENOUS at 22:07

## 2023-01-01 RX ADMIN — DEXTROSE MONOHYDRATE 25 ML: 25 INJECTION, SOLUTION INTRAVENOUS at 16:56

## 2023-01-01 RX ADMIN — METOPROLOL TARTRATE 50 MG: 25 TABLET, FILM COATED ORAL at 20:07

## 2023-01-01 RX ADMIN — DICLOFENAC SODIUM 2 G: 10 GEL TOPICAL at 17:27

## 2023-01-01 RX ADMIN — ATORVASTATIN CALCIUM 80 MG: 40 TABLET, FILM COATED ORAL at 21:21

## 2023-01-01 RX ADMIN — POTASSIUM CHLORIDE 10 MEQ: 7.46 INJECTION, SOLUTION INTRAVENOUS at 09:41

## 2023-01-01 RX ADMIN — Medication 40 MG: at 17:17

## 2023-01-01 RX ADMIN — METOPROLOL TARTRATE 50 MG: 25 TABLET, FILM COATED ORAL at 21:48

## 2023-01-01 RX ADMIN — DICLOFENAC SODIUM 2 G: 10 GEL TOPICAL at 09:06

## 2023-01-01 RX ADMIN — RISPERIDONE 0.5 MG: 0.5 TABLET, FILM COATED ORAL at 20:28

## 2023-01-01 RX ADMIN — OXYCODONE HYDROCHLORIDE 2.5 MG: 5 TABLET ORAL at 05:38

## 2023-01-01 RX ADMIN — ACETAMINOPHEN 650 MG: 325 TABLET ORAL at 12:24

## 2023-01-01 RX ADMIN — METOPROLOL TARTRATE 50 MG: 25 TABLET, FILM COATED ORAL at 08:10

## 2023-01-01 RX ADMIN — DORZOLAMIDE HYDROCHLORIDE AND TIMOLOL MALEATE 1 DROP: 20; 5 SOLUTION/ DROPS OPHTHALMIC at 09:33

## 2023-01-01 RX ADMIN — DICLOFENAC SODIUM 2 G: 10 GEL TOPICAL at 17:44

## 2023-01-01 RX ADMIN — DULOXETINE HYDROCHLORIDE 90 MG: 30 CAPSULE, DELAYED RELEASE ORAL at 08:12

## 2023-01-01 RX ADMIN — DICLOFENAC SODIUM 2 G: 10 GEL TOPICAL at 09:07

## 2023-01-01 RX ADMIN — ALTEPLASE 2 MG: 2.2 INJECTION, POWDER, LYOPHILIZED, FOR SOLUTION INTRAVENOUS at 21:36

## 2023-01-01 RX ADMIN — PIPERACILLIN AND TAZOBACTAM 3.38 G: 3; .375 INJECTION, POWDER, LYOPHILIZED, FOR SOLUTION INTRAVENOUS at 21:02

## 2023-01-01 RX ADMIN — DULOXETINE HYDROCHLORIDE 90 MG: 30 CAPSULE, DELAYED RELEASE ORAL at 08:51

## 2023-01-01 RX ADMIN — SODIUM PHOSPHATE, MONOBASIC, MONOHYDRATE AND SODIUM PHOSPHATE, DIBASIC, ANHYDROUS 9 MMOL: 142; 276 INJECTION, SOLUTION INTRAVENOUS at 13:11

## 2023-01-01 RX ADMIN — PANTOPRAZOLE SODIUM 40 MG: 40 INJECTION, POWDER, FOR SOLUTION INTRAVENOUS at 09:13

## 2023-01-01 RX ADMIN — ATORVASTATIN CALCIUM 80 MG: 40 TABLET, FILM COATED ORAL at 20:58

## 2023-01-01 RX ADMIN — DEXAMETHASONE SODIUM PHOSPHATE 4 MG: 4 INJECTION, SOLUTION INTRA-ARTICULAR; INTRALESIONAL; INTRAMUSCULAR; INTRAVENOUS; SOFT TISSUE at 20:11

## 2023-01-01 RX ADMIN — MIRTAZAPINE 15 MG: 15 TABLET, FILM COATED ORAL at 21:21

## 2023-01-01 RX ADMIN — OLOPATADINE HYDROCHLORIDE 1 DROP: 1 SOLUTION OPHTHALMIC at 08:19

## 2023-01-01 RX ADMIN — METOPROLOL TARTRATE 50 MG: 25 TABLET, FILM COATED ORAL at 20:38

## 2023-01-01 RX ADMIN — DORZOLAMIDE HYDROCHLORIDE AND TIMOLOL MALEATE 1 DROP: 22.3; 6.8 SOLUTION/ DROPS OPHTHALMIC at 08:51

## 2023-01-01 RX ADMIN — DULOXETINE HYDROCHLORIDE 90 MG: 60 CAPSULE, DELAYED RELEASE PELLETS ORAL at 09:32

## 2023-01-01 RX ADMIN — MIRTAZAPINE 15 MG: 15 TABLET, FILM COATED ORAL at 21:57

## 2023-01-01 RX ADMIN — TRAZODONE HYDROCHLORIDE 50 MG: 50 TABLET ORAL at 22:53

## 2023-01-01 RX ADMIN — Medication 0.44 MCG/KG/MIN: at 18:43

## 2023-01-01 RX ADMIN — POTASSIUM CHLORIDE FOR ORAL SOLUTION 40 MEQ: 1.5 POWDER, FOR SOLUTION ORAL at 17:36

## 2023-01-01 RX ADMIN — ASPIRIN 81 MG: 81 TABLET, CHEWABLE ORAL at 09:32

## 2023-01-01 RX ADMIN — PIPERACILLIN AND TAZOBACTAM 3.38 G: 3; .375 INJECTION, POWDER, LYOPHILIZED, FOR SOLUTION INTRAVENOUS at 20:41

## 2023-01-01 RX ADMIN — PIPERACILLIN AND TAZOBACTAM 3.38 G: 3; .375 INJECTION, POWDER, LYOPHILIZED, FOR SOLUTION INTRAVENOUS at 14:02

## 2023-01-01 RX ADMIN — PIPERACILLIN AND TAZOBACTAM 3.38 G: 3; .375 INJECTION, POWDER, LYOPHILIZED, FOR SOLUTION INTRAVENOUS at 21:18

## 2023-01-01 RX ADMIN — METOPROLOL TARTRATE 50 MG: 25 TABLET, FILM COATED ORAL at 20:59

## 2023-01-01 RX ADMIN — PIPERACILLIN AND TAZOBACTAM 3.38 G: 3; .375 INJECTION, POWDER, LYOPHILIZED, FOR SOLUTION INTRAVENOUS at 05:21

## 2023-01-01 RX ADMIN — Medication 0.51 MCG/KG/MIN: at 09:02

## 2023-01-01 RX ADMIN — LIRAGLUTIDE 1.2 MG: 6 INJECTION SUBCUTANEOUS at 08:30

## 2023-01-01 RX ADMIN — DEXTROSE 15 G: 15 GEL ORAL at 18:13

## 2023-01-01 RX ADMIN — Medication 1 TABLET: at 09:33

## 2023-01-01 RX ADMIN — Medication 1 PACKET: at 17:28

## 2023-01-01 RX ADMIN — INSULIN ASPART 1 UNITS: 100 INJECTION, SOLUTION INTRAVENOUS; SUBCUTANEOUS at 09:05

## 2023-01-01 RX ADMIN — LIDOCAINE: 50 OINTMENT TOPICAL at 19:31

## 2023-01-01 RX ADMIN — PANTOPRAZOLE SODIUM 40 MG: 40 TABLET, DELAYED RELEASE ORAL at 06:44

## 2023-01-01 RX ADMIN — METOPROLOL TARTRATE 50 MG: 25 TABLET, FILM COATED ORAL at 09:02

## 2023-01-01 RX ADMIN — THIAMINE HCL TAB 100 MG 100 MG: 100 TAB at 09:32

## 2023-01-01 RX ADMIN — CARBOXYMETHYLCELLULOSE SODIUM 1 DROP: 0.5 SOLUTION/ DROPS OPHTHALMIC at 19:54

## 2023-01-01 RX ADMIN — POLYVINYL ALCOHOL 1 DROP: 14 SOLUTION/ DROPS OPHTHALMIC at 14:33

## 2023-01-01 RX ADMIN — OLOPATADINE HYDROCHLORIDE 1 DROP: 1 SOLUTION OPHTHALMIC at 09:48

## 2023-01-01 RX ADMIN — CARBOXYMETHYLCELLULOSE SODIUM 1 DROP: 0.5 SOLUTION/ DROPS OPHTHALMIC at 17:07

## 2023-01-01 RX ADMIN — POLYVINYL ALCOHOL 1 DROP: 14 SOLUTION/ DROPS OPHTHALMIC at 08:00

## 2023-01-01 RX ADMIN — PIPERACILLIN AND TAZOBACTAM 3.38 G: 3; .375 INJECTION, POWDER, LYOPHILIZED, FOR SOLUTION INTRAVENOUS at 13:41

## 2023-01-01 RX ADMIN — SENNOSIDES AND DOCUSATE SODIUM 2 TABLET: 50; 8.6 TABLET ORAL at 10:29

## 2023-01-01 RX ADMIN — METOPROLOL TARTRATE 50 MG: 25 TABLET, FILM COATED ORAL at 08:31

## 2023-01-01 RX ADMIN — IOPAMIDOL 100 ML: 755 INJECTION, SOLUTION INTRAVENOUS at 18:32

## 2023-01-01 RX ADMIN — ATORVASTATIN CALCIUM 80 MG: 40 TABLET, FILM COATED ORAL at 20:52

## 2023-01-01 RX ADMIN — OLOPATADINE HYDROCHLORIDE 1 DROP: 1 SOLUTION OPHTHALMIC at 08:16

## 2023-01-01 RX ADMIN — METOPROLOL TARTRATE 50 MG: 25 TABLET, FILM COATED ORAL at 21:21

## 2023-01-01 RX ADMIN — ATORVASTATIN CALCIUM 80 MG: 40 TABLET, FILM COATED ORAL at 21:30

## 2023-01-01 RX ADMIN — INSULIN ASPART 1 UNITS: 100 INJECTION, SOLUTION INTRAVENOUS; SUBCUTANEOUS at 16:44

## 2023-01-01 RX ADMIN — PIPERACILLIN AND TAZOBACTAM 3.38 G: 3; .375 INJECTION, POWDER, LYOPHILIZED, FOR SOLUTION INTRAVENOUS at 20:01

## 2023-01-01 RX ADMIN — TRAZODONE HYDROCHLORIDE 50 MG: 50 TABLET ORAL at 21:24

## 2023-01-01 RX ADMIN — OXYCODONE HYDROCHLORIDE 2.5 MG: 5 TABLET ORAL at 02:45

## 2023-01-01 RX ADMIN — LATANOPROST 1 DROP: 50 SOLUTION OPHTHALMIC at 21:45

## 2023-01-01 RX ADMIN — ATORVASTATIN CALCIUM 80 MG: 40 TABLET, FILM COATED ORAL at 19:53

## 2023-01-01 RX ADMIN — DEXTROSE MONOHYDRATE AND SODIUM CHLORIDE: 5; .45 INJECTION, SOLUTION INTRAVENOUS at 05:52

## 2023-01-01 RX ADMIN — TAZOBACTAM SODIUM AND PIPERACILLIN SODIUM 3.38 G: 375; 3 INJECTION, SOLUTION INTRAVENOUS at 20:03

## 2023-01-01 RX ADMIN — MIRTAZAPINE 15 MG: 15 TABLET, FILM COATED ORAL at 20:56

## 2023-01-01 RX ADMIN — LATANOPROST 1 DROP: 50 SOLUTION/ DROPS OPHTHALMIC at 20:42

## 2023-01-01 RX ADMIN — PIPERACILLIN AND TAZOBACTAM 3.38 G: 3; .375 INJECTION, POWDER, LYOPHILIZED, FOR SOLUTION INTRAVENOUS at 13:14

## 2023-01-01 RX ADMIN — VANCOMYCIN HYDROCHLORIDE 1250 MG: 5 INJECTION, POWDER, LYOPHILIZED, FOR SOLUTION INTRAVENOUS at 15:52

## 2023-01-01 RX ADMIN — DEXAMETHASONE SODIUM PHOSPHATE 4 MG: 4 INJECTION, SOLUTION INTRAMUSCULAR; INTRAVENOUS at 02:14

## 2023-01-01 RX ADMIN — INSULIN ASPART 1 UNITS: 100 INJECTION, SOLUTION INTRAVENOUS; SUBCUTANEOUS at 13:02

## 2023-01-01 RX ADMIN — DICLOFENAC SODIUM 2 G: 10 GEL TOPICAL at 21:48

## 2023-01-01 RX ADMIN — Medication 40 MG: at 07:15

## 2023-01-01 RX ADMIN — THIAMINE HCL TAB 100 MG 100 MG: 100 TAB at 08:28

## 2023-01-01 RX ADMIN — PANTOPRAZOLE SODIUM 40 MG: 40 TABLET, DELAYED RELEASE ORAL at 06:20

## 2023-01-01 RX ADMIN — DEXTROSE MONOHYDRATE AND SODIUM CHLORIDE: 5; .45 INJECTION, SOLUTION INTRAVENOUS at 18:16

## 2023-01-01 RX ADMIN — CARBOXYMETHYLCELLULOSE SODIUM 1 DROP: 0.5 SOLUTION/ DROPS OPHTHALMIC at 13:18

## 2023-01-01 RX ADMIN — DICLOFENAC SODIUM 2 G: 10 GEL TOPICAL at 09:04

## 2023-01-01 RX ADMIN — TAZOBACTAM SODIUM AND PIPERACILLIN SODIUM 3.38 G: 375; 3 INJECTION, SOLUTION INTRAVENOUS at 21:54

## 2023-01-01 RX ADMIN — TRAZODONE HYDROCHLORIDE 50 MG: 50 TABLET ORAL at 22:09

## 2023-01-01 RX ADMIN — DEXTROSE MONOHYDRATE AND SODIUM CHLORIDE: 5; .45 INJECTION, SOLUTION INTRAVENOUS at 04:28

## 2023-01-01 RX ADMIN — MIRTAZAPINE 15 MG: 15 TABLET, FILM COATED ORAL at 20:10

## 2023-01-01 RX ADMIN — PANTOPRAZOLE SODIUM 40 MG: 40 TABLET, DELAYED RELEASE ORAL at 08:05

## 2023-01-01 RX ADMIN — ATORVASTATIN CALCIUM 80 MG: 40 TABLET, FILM COATED ORAL at 20:13

## 2023-01-01 RX ADMIN — PANTOPRAZOLE SODIUM 40 MG: 40 TABLET, DELAYED RELEASE ORAL at 15:59

## 2023-01-01 RX ADMIN — POTASSIUM CHLORIDE 10 MEQ: 7.46 INJECTION, SOLUTION INTRAVENOUS at 10:48

## 2023-01-01 RX ADMIN — TRAZODONE HYDROCHLORIDE 50 MG: 50 TABLET ORAL at 21:21

## 2023-01-01 RX ADMIN — THIAMINE HCL TAB 100 MG 100 MG: 100 TAB at 09:55

## 2023-01-01 RX ADMIN — HALOPERIDOL LACTATE 2 MG: 5 INJECTION, SOLUTION INTRAMUSCULAR at 13:24

## 2023-01-01 RX ADMIN — METHOCARBAMOL 500 MG: 500 TABLET, FILM COATED ORAL at 19:39

## 2023-01-01 RX ADMIN — SODIUM CHLORIDE, POTASSIUM CHLORIDE, SODIUM LACTATE AND CALCIUM CHLORIDE 1000 ML: 600; 310; 30; 20 INJECTION, SOLUTION INTRAVENOUS at 05:35

## 2023-01-01 RX ADMIN — OXYCODONE HYDROCHLORIDE 2.5 MG: 5 TABLET ORAL at 18:06

## 2023-01-01 RX ADMIN — OXYCODONE HYDROCHLORIDE 2.5 MG: 5 TABLET ORAL at 11:36

## 2023-01-01 RX ADMIN — DEXTROSE MONOHYDRATE 25 ML: 25 INJECTION, SOLUTION INTRAVENOUS at 04:27

## 2023-01-01 RX ADMIN — ALTEPLASE 2 MG: 2.2 INJECTION, POWDER, LYOPHILIZED, FOR SOLUTION INTRAVENOUS at 21:37

## 2023-01-01 RX ADMIN — PIPERACILLIN AND TAZOBACTAM 3.38 G: 3; .375 INJECTION, POWDER, LYOPHILIZED, FOR SOLUTION INTRAVENOUS at 12:43

## 2023-01-01 RX ADMIN — SODIUM CHLORIDE, PRESERVATIVE FREE 10 ML: 5 INJECTION INTRAVENOUS at 13:20

## 2023-01-01 RX ADMIN — OXYCODONE HYDROCHLORIDE 2.5 MG: 5 TABLET ORAL at 14:17

## 2023-01-01 RX ADMIN — FENTANYL CITRATE 50 MCG: 50 INJECTION, SOLUTION INTRAMUSCULAR; INTRAVENOUS at 11:27

## 2023-01-01 RX ADMIN — DORZOLAMIDE HYDROCHLORIDE AND TIMOLOL MALEATE 1 DROP: 20; 5 SOLUTION/ DROPS OPHTHALMIC at 07:57

## 2023-01-01 RX ADMIN — OLOPATADINE HYDROCHLORIDE 1 DROP: 1 SOLUTION OPHTHALMIC at 09:56

## 2023-01-01 RX ADMIN — Medication 40 MG: at 05:43

## 2023-01-01 RX ADMIN — HYDROMORPHONE HYDROCHLORIDE 0.2 MG: 0.2 INJECTION, SOLUTION INTRAMUSCULAR; INTRAVENOUS; SUBCUTANEOUS at 00:38

## 2023-01-01 RX ADMIN — ONDANSETRON 4 MG: 2 INJECTION INTRAMUSCULAR; INTRAVENOUS at 21:03

## 2023-01-01 RX ADMIN — LIRAGLUTIDE 1.2 MG: 6 INJECTION SUBCUTANEOUS at 09:40

## 2023-01-01 RX ADMIN — THIAMINE HCL TAB 100 MG 100 MG: 100 TAB at 13:45

## 2023-01-01 RX ADMIN — DORZOLAMIDE HYDROCHLORIDE AND TIMOLOL MALEATE 1 DROP: 20; 5 SOLUTION/ DROPS OPHTHALMIC at 20:24

## 2023-01-01 RX ADMIN — METOPROLOL TARTRATE 50 MG: 25 TABLET, FILM COATED ORAL at 08:56

## 2023-01-01 RX ADMIN — DICLOFENAC SODIUM 2 G: 10 GEL TOPICAL at 12:42

## 2023-01-01 RX ADMIN — PIPERACILLIN AND TAZOBACTAM 3.38 G: 3; .375 INJECTION, POWDER, LYOPHILIZED, FOR SOLUTION INTRAVENOUS at 14:00

## 2023-01-01 RX ADMIN — METHOCARBAMOL 500 MG: 500 TABLET, FILM COATED ORAL at 20:51

## 2023-01-01 RX ADMIN — ACETAMINOPHEN 650 MG: 325 SOLUTION ORAL at 10:31

## 2023-01-01 RX ADMIN — METOPROLOL TARTRATE 50 MG: 25 TABLET, FILM COATED ORAL at 09:45

## 2023-01-01 RX ADMIN — PIPERACILLIN AND TAZOBACTAM 3.38 G: 3; .375 INJECTION, POWDER, LYOPHILIZED, FOR SOLUTION INTRAVENOUS at 06:29

## 2023-01-01 RX ADMIN — DICLOFENAC SODIUM 2 G: 10 GEL TOPICAL at 08:29

## 2023-01-01 RX ADMIN — TAZOBACTAM SODIUM AND PIPERACILLIN SODIUM 3.38 G: 375; 3 INJECTION, SOLUTION INTRAVENOUS at 16:02

## 2023-01-01 RX ADMIN — ACETAMINOPHEN 650 MG: 325 TABLET ORAL at 19:17

## 2023-01-01 RX ADMIN — POTASSIUM CHLORIDE 10 MEQ: 7.46 INJECTION, SOLUTION INTRAVENOUS at 09:04

## 2023-01-01 RX ADMIN — POTASSIUM CHLORIDE 20 MEQ: 1500 TABLET, EXTENDED RELEASE ORAL at 18:16

## 2023-01-01 RX ADMIN — DORZOLAMIDE HYDROCHLORIDE AND TIMOLOL MALEATE 1 DROP: 20; 5 SOLUTION/ DROPS OPHTHALMIC at 09:05

## 2023-01-01 RX ADMIN — DORZOLAMIDE HYDROCHLORIDE AND TIMOLOL MALEATE 1 DROP: 20; 5 SOLUTION/ DROPS OPHTHALMIC at 20:43

## 2023-01-01 RX ADMIN — SODIUM PHOSPHATE, MONOBASIC, MONOHYDRATE AND SODIUM PHOSPHATE, DIBASIC, ANHYDROUS 9 MMOL: 142; 276 INJECTION, SOLUTION INTRAVENOUS at 09:26

## 2023-01-01 RX ADMIN — PIPERACILLIN AND TAZOBACTAM 3.38 G: 3; .375 INJECTION, POWDER, LYOPHILIZED, FOR SOLUTION INTRAVENOUS at 06:02

## 2023-01-01 RX ADMIN — PANTOPRAZOLE SODIUM 40 MG: 40 TABLET, DELAYED RELEASE ORAL at 06:41

## 2023-01-01 RX ADMIN — DICLOFENAC SODIUM 2 G: 10 GEL TOPICAL at 11:14

## 2023-01-01 RX ADMIN — THIAMINE HCL TAB 100 MG 100 MG: 100 TAB at 09:04

## 2023-01-01 RX ADMIN — METOPROLOL TARTRATE 50 MG: 25 TABLET, FILM COATED ORAL at 10:03

## 2023-01-01 RX ADMIN — DICLOFENAC SODIUM 2 G: 10 GEL TOPICAL at 12:40

## 2023-01-01 RX ADMIN — DICLOFENAC SODIUM 2 G: 10 GEL TOPICAL at 09:43

## 2023-01-01 RX ADMIN — DORZOLAMIDE HYDROCHLORIDE AND TIMOLOL MALEATE 1 DROP: 20; 5 SOLUTION/ DROPS OPHTHALMIC at 20:59

## 2023-01-01 RX ADMIN — FENTANYL CITRATE 25 MCG: 50 INJECTION, SOLUTION INTRAMUSCULAR; INTRAVENOUS at 21:35

## 2023-01-01 RX ADMIN — LIDOCAINE HYDROCHLORIDE 40 MG: 10 INJECTION, SOLUTION INFILTRATION; PERINEURAL at 20:11

## 2023-01-01 RX ADMIN — OXYCODONE HYDROCHLORIDE 2.5 MG: 5 TABLET ORAL at 08:12

## 2023-01-01 RX ADMIN — OXYCODONE HYDROCHLORIDE 2.5 MG: 5 TABLET ORAL at 19:17

## 2023-01-01 RX ADMIN — DORZOLAMIDE HYDROCHLORIDE AND TIMOLOL MALEATE 1 DROP: 20; 5 SOLUTION/ DROPS OPHTHALMIC at 21:11

## 2023-01-01 RX ADMIN — ASPIRIN 81 MG: 81 TABLET, CHEWABLE ORAL at 09:33

## 2023-01-01 RX ADMIN — DEXTROSE MONOHYDRATE AND SODIUM CHLORIDE: 5; .45 INJECTION, SOLUTION INTRAVENOUS at 01:43

## 2023-01-01 RX ADMIN — DICLOFENAC SODIUM 2 G: 10 GEL TOPICAL at 18:37

## 2023-01-01 RX ADMIN — DULOXETINE HYDROCHLORIDE 90 MG: 60 CAPSULE, DELAYED RELEASE PELLETS ORAL at 08:10

## 2023-01-01 RX ADMIN — PIPERACILLIN AND TAZOBACTAM 3.38 G: 3; .375 INJECTION, POWDER, LYOPHILIZED, FOR SOLUTION INTRAVENOUS at 12:30

## 2023-01-01 RX ADMIN — ENOXAPARIN SODIUM 30 MG: 30 INJECTION SUBCUTANEOUS at 17:35

## 2023-01-01 RX ADMIN — DICLOFENAC SODIUM 2 G: 10 GEL TOPICAL at 20:54

## 2023-01-01 RX ADMIN — DORZOLAMIDE HYDROCHLORIDE AND TIMOLOL MALEATE 1 DROP: 20; 5 SOLUTION/ DROPS OPHTHALMIC at 21:31

## 2023-01-01 RX ADMIN — PIPERACILLIN AND TAZOBACTAM 3.38 G: 3; .375 INJECTION, POWDER, LYOPHILIZED, FOR SOLUTION INTRAVENOUS at 20:56

## 2023-01-01 RX ADMIN — DICLOFENAC SODIUM 2 G: 10 GEL TOPICAL at 20:34

## 2023-01-01 RX ADMIN — METOPROLOL TARTRATE 50 MG: 25 TABLET, FILM COATED ORAL at 11:59

## 2023-01-01 RX ADMIN — VASOPRESSIN 2.4 UNITS/HR: 20 INJECTION, SOLUTION INTRAMUSCULAR; SUBCUTANEOUS at 08:11

## 2023-01-01 RX ADMIN — PIPERACILLIN AND TAZOBACTAM 3.38 G: 3; .375 INJECTION, POWDER, LYOPHILIZED, FOR SOLUTION INTRAVENOUS at 15:34

## 2023-01-01 RX ADMIN — INSULIN ASPART 2 UNITS: 100 INJECTION, SOLUTION INTRAVENOUS; SUBCUTANEOUS at 12:26

## 2023-01-01 RX ADMIN — PANTOPRAZOLE SODIUM 40 MG: 40 TABLET, DELAYED RELEASE ORAL at 05:07

## 2023-01-01 RX ADMIN — DEXTROSE MONOHYDRATE AND SODIUM CHLORIDE: 5; .45 INJECTION, SOLUTION INTRAVENOUS at 00:06

## 2023-01-01 RX ADMIN — INSULIN ASPART 1 UNITS: 100 INJECTION, SOLUTION INTRAVENOUS; SUBCUTANEOUS at 08:13

## 2023-01-01 RX ADMIN — THIAMINE HCL TAB 100 MG 100 MG: 100 TAB at 09:48

## 2023-01-01 RX ADMIN — POLYVINYL ALCOHOL 1 DROP: 14 SOLUTION/ DROPS OPHTHALMIC at 13:11

## 2023-01-01 RX ADMIN — Medication 1 PACKET: at 16:39

## 2023-01-01 RX ADMIN — PIPERACILLIN AND TAZOBACTAM 3.38 G: 3; .375 INJECTION, POWDER, LYOPHILIZED, FOR SOLUTION INTRAVENOUS at 05:38

## 2023-01-01 RX ADMIN — SODIUM CHLORIDE: 9 INJECTION, SOLUTION INTRAVENOUS at 06:32

## 2023-01-01 RX ADMIN — METOPROLOL TARTRATE 50 MG: 25 TABLET, FILM COATED ORAL at 20:23

## 2023-01-01 RX ADMIN — METFORMIN ER 500 MG 500 MG: 500 TABLET ORAL at 09:54

## 2023-01-01 RX ADMIN — ASPIRIN 81 MG: 81 TABLET, CHEWABLE ORAL at 10:29

## 2023-01-01 RX ADMIN — DICLOFENAC SODIUM 2 G: 10 GEL TOPICAL at 12:51

## 2023-01-01 RX ADMIN — ENOXAPARIN SODIUM 40 MG: 40 INJECTION SUBCUTANEOUS at 08:01

## 2023-01-01 RX ADMIN — Medication 1 TABLET: at 09:31

## 2023-01-01 RX ADMIN — DORZOLAMIDE HYDROCHLORIDE AND TIMOLOL MALEATE 1 DROP: 20; 5 SOLUTION/ DROPS OPHTHALMIC at 09:19

## 2023-01-01 RX ADMIN — DORZOLAMIDE HYDROCHLORIDE AND TIMOLOL MALEATE 1 DROP: 20; 5 SOLUTION/ DROPS OPHTHALMIC at 10:13

## 2023-01-01 RX ADMIN — TAZOBACTAM SODIUM AND PIPERACILLIN SODIUM 3.38 G: 375; 3 INJECTION, SOLUTION INTRAVENOUS at 13:59

## 2023-01-01 RX ADMIN — Medication 40 MG: at 18:17

## 2023-01-01 RX ADMIN — POTASSIUM CHLORIDE 20 MEQ: 1.5 POWDER, FOR SOLUTION ORAL at 08:47

## 2023-01-01 RX ADMIN — DICLOFENAC SODIUM 2 G: 10 GEL TOPICAL at 16:43

## 2023-01-01 RX ADMIN — POLYVINYL ALCOHOL 1 DROP: 14 SOLUTION/ DROPS OPHTHALMIC at 08:54

## 2023-01-01 RX ADMIN — PIPERACILLIN AND TAZOBACTAM 3.38 G: 3; .375 INJECTION, POWDER, LYOPHILIZED, FOR SOLUTION INTRAVENOUS at 12:39

## 2023-01-01 RX ADMIN — PIPERACILLIN AND TAZOBACTAM 3.38 G: 3; .375 INJECTION, POWDER, LYOPHILIZED, FOR SOLUTION INTRAVENOUS at 05:37

## 2023-01-01 RX ADMIN — SODIUM CHLORIDE: 9 INJECTION, SOLUTION INTRAVENOUS at 19:10

## 2023-01-01 RX ADMIN — POLYVINYL ALCOHOL 1 DROP: 14 SOLUTION/ DROPS OPHTHALMIC at 22:00

## 2023-01-01 RX ADMIN — CLINDAMYCIN PHOSPHATE 900 MG: 900 INJECTION, SOLUTION INTRAVENOUS at 04:01

## 2023-01-01 RX ADMIN — PIPERACILLIN AND TAZOBACTAM 3.38 G: 3; .375 INJECTION, POWDER, LYOPHILIZED, FOR SOLUTION INTRAVENOUS at 05:42

## 2023-01-01 RX ADMIN — POLYVINYL ALCOHOL 1 DROP: 14 SOLUTION/ DROPS OPHTHALMIC at 21:55

## 2023-01-01 RX ADMIN — PHENYLEPHRINE HYDROCHLORIDE 100 MCG: 10 INJECTION INTRAVENOUS at 21:00

## 2023-01-01 RX ADMIN — OXYCODONE HYDROCHLORIDE 2.5 MG: 5 TABLET ORAL at 13:05

## 2023-01-01 RX ADMIN — ATORVASTATIN CALCIUM 80 MG: 40 TABLET, FILM COATED ORAL at 20:11

## 2023-01-01 RX ADMIN — RISPERIDONE 0.5 MG: 0.5 TABLET, FILM COATED ORAL at 08:09

## 2023-01-01 RX ADMIN — DULOXETINE HYDROCHLORIDE 90 MG: 60 CAPSULE, DELAYED RELEASE PELLETS ORAL at 08:27

## 2023-01-01 RX ADMIN — DICLOFENAC SODIUM 2 G: 10 GEL TOPICAL at 13:47

## 2023-01-01 RX ADMIN — DULOXETINE HYDROCHLORIDE 90 MG: 60 CAPSULE, DELAYED RELEASE PELLETS ORAL at 09:08

## 2023-01-01 RX ADMIN — METOPROLOL TARTRATE 50 MG: 25 TABLET, FILM COATED ORAL at 20:01

## 2023-01-01 RX ADMIN — DORZOLAMIDE HYDROCHLORIDE AND TIMOLOL MALEATE 1 DROP: 20; 5 SOLUTION/ DROPS OPHTHALMIC at 08:02

## 2023-01-01 RX ADMIN — OXYCODONE HYDROCHLORIDE 2.5 MG: 5 TABLET ORAL at 08:39

## 2023-01-01 RX ADMIN — PIPERACILLIN AND TAZOBACTAM 3.38 G: 3; .375 INJECTION, POWDER, LYOPHILIZED, FOR SOLUTION INTRAVENOUS at 13:49

## 2023-01-01 RX ADMIN — OLOPATADINE HYDROCHLORIDE 1 DROP: 1 SOLUTION OPHTHALMIC at 09:33

## 2023-01-01 RX ADMIN — OXYCODONE HYDROCHLORIDE 2.5 MG: 5 TABLET ORAL at 09:03

## 2023-01-01 RX ADMIN — Medication 40 MG: at 07:52

## 2023-01-01 RX ADMIN — DICLOFENAC SODIUM 2 G: 10 GEL TOPICAL at 09:44

## 2023-01-01 RX ADMIN — DULOXETINE HYDROCHLORIDE 90 MG: 30 CAPSULE, DELAYED RELEASE ORAL at 08:49

## 2023-01-01 RX ADMIN — PANTOPRAZOLE SODIUM 40 MG: 40 TABLET, DELAYED RELEASE ORAL at 15:15

## 2023-01-01 RX ADMIN — INSULIN GLARGINE 20 UNITS: 100 INJECTION, SOLUTION SUBCUTANEOUS at 09:21

## 2023-01-01 RX ADMIN — OXYCODONE HYDROCHLORIDE 2.5 MG: 5 TABLET ORAL at 10:54

## 2023-01-01 RX ADMIN — METOPROLOL TARTRATE 50 MG: 25 TABLET, FILM COATED ORAL at 21:08

## 2023-01-01 RX ADMIN — INSULIN ASPART 1 UNITS: 100 INJECTION, SOLUTION INTRAVENOUS; SUBCUTANEOUS at 10:03

## 2023-01-01 RX ADMIN — HYDROMORPHONE HYDROCHLORIDE 0.4 MG: 0.2 INJECTION, SOLUTION INTRAMUSCULAR; INTRAVENOUS; SUBCUTANEOUS at 18:14

## 2023-01-01 RX ADMIN — OLOPATADINE HYDROCHLORIDE 1 DROP: 1 SOLUTION OPHTHALMIC at 08:03

## 2023-01-01 RX ADMIN — SODIUM PHOSPHATE, MONOBASIC, MONOHYDRATE AND SODIUM PHOSPHATE, DIBASIC, ANHYDROUS 15 MMOL: 142; 276 INJECTION, SOLUTION INTRAVENOUS at 16:30

## 2023-01-01 RX ADMIN — IOPAMIDOL 65 ML: 755 INJECTION, SOLUTION INTRAVENOUS at 15:37

## 2023-01-01 RX ADMIN — DEXTROSE MONOHYDRATE AND SODIUM CHLORIDE: 5; .45 INJECTION, SOLUTION INTRAVENOUS at 06:26

## 2023-01-01 RX ADMIN — METOPROLOL TARTRATE 100 MG: 25 TABLET, FILM COATED ORAL at 12:22

## 2023-01-01 RX ADMIN — DULOXETINE HYDROCHLORIDE 90 MG: 60 CAPSULE, DELAYED RELEASE PELLETS ORAL at 08:42

## 2023-01-01 RX ADMIN — ATORVASTATIN CALCIUM 80 MG: 40 TABLET, FILM COATED ORAL at 20:51

## 2023-01-01 RX ADMIN — ATORVASTATIN CALCIUM 80 MG: 40 TABLET, FILM COATED ORAL at 21:49

## 2023-01-01 RX ADMIN — DULOXETINE HYDROCHLORIDE 90 MG: 30 CAPSULE, DELAYED RELEASE ORAL at 08:55

## 2023-01-01 RX ADMIN — INSULIN ASPART 1 UNITS: 100 INJECTION, SOLUTION INTRAVENOUS; SUBCUTANEOUS at 08:17

## 2023-01-01 RX ADMIN — DULOXETINE HYDROCHLORIDE 90 MG: 60 CAPSULE, DELAYED RELEASE PELLETS ORAL at 09:31

## 2023-01-01 RX ADMIN — ENOXAPARIN SODIUM 30 MG: 30 INJECTION SUBCUTANEOUS at 19:59

## 2023-01-01 RX ADMIN — PIPERACILLIN AND TAZOBACTAM 3.38 G: 3; .375 INJECTION, POWDER, LYOPHILIZED, FOR SOLUTION INTRAVENOUS at 20:39

## 2023-01-01 RX ADMIN — DORZOLAMIDE HYDROCHLORIDE AND TIMOLOL MALEATE 1 DROP: 20; 5 SOLUTION/ DROPS OPHTHALMIC at 20:56

## 2023-01-01 RX ADMIN — PANTOPRAZOLE SODIUM 40 MG: 40 TABLET, DELAYED RELEASE ORAL at 07:01

## 2023-01-01 RX ADMIN — POLYVINYL ALCOHOL 1 DROP: 14 SOLUTION/ DROPS OPHTHALMIC at 21:54

## 2023-01-01 RX ADMIN — OXYCODONE HYDROCHLORIDE 2.5 MG: 5 TABLET ORAL at 04:07

## 2023-01-01 RX ADMIN — MIRTAZAPINE 15 MG: 15 TABLET, FILM COATED ORAL at 21:53

## 2023-01-01 RX ADMIN — VANCOMYCIN HYDROCHLORIDE 1250 MG: 5 INJECTION, POWDER, LYOPHILIZED, FOR SOLUTION INTRAVENOUS at 16:01

## 2023-01-01 RX ADMIN — PANTOPRAZOLE SODIUM 40 MG: 40 TABLET, DELAYED RELEASE ORAL at 09:08

## 2023-01-01 RX ADMIN — MIRTAZAPINE 15 MG: 15 TABLET, FILM COATED ORAL at 22:43

## 2023-01-01 RX ADMIN — Medication 0.4 MCG/KG/MIN: at 16:02

## 2023-01-01 RX ADMIN — PIPERACILLIN AND TAZOBACTAM 3.38 G: 3; .375 INJECTION, POWDER, LYOPHILIZED, FOR SOLUTION INTRAVENOUS at 13:23

## 2023-01-01 RX ADMIN — TRAZODONE HYDROCHLORIDE 50 MG: 50 TABLET ORAL at 20:21

## 2023-01-01 RX ADMIN — TRAZODONE HYDROCHLORIDE 50 MG: 50 TABLET ORAL at 20:38

## 2023-01-01 RX ADMIN — ENOXAPARIN SODIUM 30 MG: 30 INJECTION SUBCUTANEOUS at 17:43

## 2023-01-01 RX ADMIN — DICLOFENAC SODIUM 2 G: 10 GEL TOPICAL at 21:37

## 2023-01-01 RX ADMIN — PANTOPRAZOLE SODIUM 40 MG: 40 TABLET, DELAYED RELEASE ORAL at 16:30

## 2023-01-01 RX ADMIN — POTASSIUM CHLORIDE: 2 INJECTION, SOLUTION, CONCENTRATE INTRAVENOUS at 10:31

## 2023-01-01 RX ADMIN — Medication 40 MG: at 06:09

## 2023-01-01 RX ADMIN — PHENYLEPHRINE HYDROCHLORIDE 200 MCG: 10 INJECTION INTRAVENOUS at 20:49

## 2023-01-01 RX ADMIN — POTASSIUM & SODIUM PHOSPHATES POWDER PACK 280-160-250 MG 1 PACKET: 280-160-250 PACK at 12:44

## 2023-01-01 RX ADMIN — INSULIN ASPART 1 UNITS: 100 INJECTION, SOLUTION INTRAVENOUS; SUBCUTANEOUS at 12:38

## 2023-01-01 RX ADMIN — HYDRALAZINE HYDROCHLORIDE 5 MG: 20 INJECTION INTRAMUSCULAR; INTRAVENOUS at 12:35

## 2023-01-01 RX ADMIN — SODIUM BICARBONATE 100 MEQ: 84 INJECTION INTRAVENOUS at 21:59

## 2023-01-01 RX ADMIN — DULOXETINE HYDROCHLORIDE 60 MG: 30 CAPSULE, DELAYED RELEASE ORAL at 09:01

## 2023-01-01 RX ADMIN — PIPERACILLIN AND TAZOBACTAM 3.38 G: 3; .375 INJECTION, POWDER, LYOPHILIZED, FOR SOLUTION INTRAVENOUS at 06:44

## 2023-01-01 RX ADMIN — MULTIVITAMIN 15 ML: LIQUID ORAL at 09:08

## 2023-01-01 ASSESSMENT — ACTIVITIES OF DAILY LIVING (ADL)
ADLS_ACUITY_SCORE: 35
ADLS_ACUITY_SCORE: 75
ADLS_ACUITY_SCORE: 45
ADLS_ACUITY_SCORE: 76
DRESS: 2-->COMPLETELY DEPENDENT (NOT DEVELOPMENTALLY APPROPRIATE)
ADLS_ACUITY_SCORE: 65
ADLS_ACUITY_SCORE: 67
ADLS_ACUITY_SCORE: 67
BATHING: 2-->COMPLETELY DEPENDENT (NOT DEVELOPMENTALLY APPROPRIATE)
DRESS: 2-->COMPLETELY DEPENDENT
ADLS_ACUITY_SCORE: 75
ADLS_ACUITY_SCORE: 73
ADLS_ACUITY_SCORE: 69
ADLS_ACUITY_SCORE: 75
ADLS_ACUITY_SCORE: 67
ADLS_ACUITY_SCORE: 67
ADLS_ACUITY_SCORE: 69
ADLS_ACUITY_SCORE: 73
ADLS_ACUITY_SCORE: 70
ADLS_ACUITY_SCORE: 55
ADLS_ACUITY_SCORE: 76
ADLS_ACUITY_SCORE: 67
ADLS_ACUITY_SCORE: 55
ADLS_ACUITY_SCORE: 65
ADLS_ACUITY_SCORE: 43
ADLS_ACUITY_SCORE: 69
ADLS_ACUITY_SCORE: 35
ADLS_ACUITY_SCORE: 67
ADLS_ACUITY_SCORE: 76
ADLS_ACUITY_SCORE: 76
ADLS_ACUITY_SCORE: 45
ADLS_ACUITY_SCORE: 55
ADLS_ACUITY_SCORE: 77
ADLS_ACUITY_SCORE: 75
ADLS_ACUITY_SCORE: 75
ADLS_ACUITY_SCORE: 43
ADLS_ACUITY_SCORE: 35
ADLS_ACUITY_SCORE: 78
ADLS_ACUITY_SCORE: 80
ADLS_ACUITY_SCORE: 43
ADLS_ACUITY_SCORE: 69
ADLS_ACUITY_SCORE: 78
ADLS_ACUITY_SCORE: 73
ADLS_ACUITY_SCORE: 69
ADLS_ACUITY_SCORE: 77
DIFFICULTY_COMMUNICATING: NO
ADLS_ACUITY_SCORE: 74
ADLS_ACUITY_SCORE: 31
ADLS_ACUITY_SCORE: 74
ADLS_ACUITY_SCORE: 80
ADLS_ACUITY_SCORE: 80
ADLS_ACUITY_SCORE: 75
ADLS_ACUITY_SCORE: 39
ADLS_ACUITY_SCORE: 72
ADLS_ACUITY_SCORE: 71
ADLS_ACUITY_SCORE: 67
ADLS_ACUITY_SCORE: 74
ADLS_ACUITY_SCORE: 71
ADLS_ACUITY_SCORE: 71
ADLS_ACUITY_SCORE: 39
ADLS_ACUITY_SCORE: 45
ADLS_ACUITY_SCORE: 67
ADLS_ACUITY_SCORE: 67
ADLS_ACUITY_SCORE: 72
ADLS_ACUITY_SCORE: 71
ADLS_ACUITY_SCORE: 45
TOILETING_ISSUES: YES
ADLS_ACUITY_SCORE: 67
ADLS_ACUITY_SCORE: 69
ADLS_ACUITY_SCORE: 77
ADLS_ACUITY_SCORE: 80
ADLS_ACUITY_SCORE: 35
ADLS_ACUITY_SCORE: 71
ADLS_ACUITY_SCORE: 43
ADLS_ACUITY_SCORE: 67
ADLS_ACUITY_SCORE: 79
ADLS_ACUITY_SCORE: 67
DRESS: 2-->COMPLETELY DEPENDENT (NOT DEVELOPMENTALLY APPROPRIATE)
ADLS_ACUITY_SCORE: 74
ADLS_ACUITY_SCORE: 80
ADLS_ACUITY_SCORE: 39
ADLS_ACUITY_SCORE: 76
FALL_HISTORY_WITHIN_LAST_SIX_MONTHS: NO
ADLS_ACUITY_SCORE: 72
ADLS_ACUITY_SCORE: 76
ADLS_ACUITY_SCORE: 57
ADLS_ACUITY_SCORE: 69
ADLS_ACUITY_SCORE: 72
TOILETING: 2-->COMPLETELY DEPENDENT (NOT DEVELOPMENTALLY APPROPRIATE)
ADLS_ACUITY_SCORE: 69
ADLS_ACUITY_SCORE: 70
ADLS_ACUITY_SCORE: 76
ADLS_ACUITY_SCORE: 41
ADLS_ACUITY_SCORE: 76
ADLS_ACUITY_SCORE: 69
ADLS_ACUITY_SCORE: 74
ADLS_ACUITY_SCORE: 41
ADLS_ACUITY_SCORE: 75
ADLS_ACUITY_SCORE: 74
ADLS_ACUITY_SCORE: 75
ADLS_ACUITY_SCORE: 80
ADLS_ACUITY_SCORE: 69
ADLS_ACUITY_SCORE: 43
ADLS_ACUITY_SCORE: 45
ADLS_ACUITY_SCORE: 69
ADLS_ACUITY_SCORE: 70
ADLS_ACUITY_SCORE: 35
ADLS_ACUITY_SCORE: 45
ADLS_ACUITY_SCORE: 67
ADLS_ACUITY_SCORE: 80
ADLS_ACUITY_SCORE: 67
ADLS_ACUITY_SCORE: 75
ADLS_ACUITY_SCORE: 77
ADLS_ACUITY_SCORE: 69
ADLS_ACUITY_SCORE: 47
ADLS_ACUITY_SCORE: 76
ADLS_ACUITY_SCORE: 70
ADLS_ACUITY_SCORE: 71
ADLS_ACUITY_SCORE: 75
ADLS_ACUITY_SCORE: 74
ADLS_ACUITY_SCORE: 76
ADLS_ACUITY_SCORE: 67
WALKING_OR_CLIMBING_STAIRS: STAIR CLIMBING DIFFICULTY, REQUIRES EQUIPMENT
ADLS_ACUITY_SCORE: 63
ADLS_ACUITY_SCORE: 35
ADLS_ACUITY_SCORE: 69
ADLS_ACUITY_SCORE: 47
ADLS_ACUITY_SCORE: 43
ADLS_ACUITY_SCORE: 71
ADLS_ACUITY_SCORE: 76
ADLS_ACUITY_SCORE: 74
ADLS_ACUITY_SCORE: 73
ADLS_ACUITY_SCORE: 45
ADLS_ACUITY_SCORE: 69
ADLS_ACUITY_SCORE: 69
ADLS_ACUITY_SCORE: 78
ADLS_ACUITY_SCORE: 35
ADLS_ACUITY_SCORE: 75
ADLS_ACUITY_SCORE: 70
ADLS_ACUITY_SCORE: 71
ADLS_ACUITY_SCORE: 70
ADLS_ACUITY_SCORE: 74
ADLS_ACUITY_SCORE: 70
ADLS_ACUITY_SCORE: 41
DRESSING/BATHING: BATHING DIFFICULTY, DEPENDENT
ADLS_ACUITY_SCORE: 69
ADLS_ACUITY_SCORE: 67
ADLS_ACUITY_SCORE: 43
ADLS_ACUITY_SCORE: 71
ADLS_ACUITY_SCORE: 72
ADLS_ACUITY_SCORE: 47
ADLS_ACUITY_SCORE: 63
ADLS_ACUITY_SCORE: 63
ADLS_ACUITY_SCORE: 79
ADLS_ACUITY_SCORE: 74
ADLS_ACUITY_SCORE: 73
TOILETING_ISSUES: YES
ADLS_ACUITY_SCORE: 69
ADLS_ACUITY_SCORE: 76
DEPENDENT_IADLS:: CLEANING;COOKING;LAUNDRY;SHOPPING;MEAL PREPARATION;MEDICATION MANAGEMENT;MONEY MANAGEMENT;TRANSPORTATION;INCONTINENCE
ADLS_ACUITY_SCORE: 57
ADLS_ACUITY_SCORE: 70
ADLS_ACUITY_SCORE: 75
ADLS_ACUITY_SCORE: 45
ADLS_ACUITY_SCORE: 71
ADLS_ACUITY_SCORE: 74
ADLS_ACUITY_SCORE: 55
ADLS_ACUITY_SCORE: 55
ADLS_ACUITY_SCORE: 67
ADLS_ACUITY_SCORE: 47
ADLS_ACUITY_SCORE: 45
ADLS_ACUITY_SCORE: 74
ADLS_ACUITY_SCORE: 67
ADLS_ACUITY_SCORE: 76
ADLS_ACUITY_SCORE: 80
ADLS_ACUITY_SCORE: 67
ADLS_ACUITY_SCORE: 74
ADLS_ACUITY_SCORE: 67
ADLS_ACUITY_SCORE: 69
ADLS_ACUITY_SCORE: 74
ADLS_ACUITY_SCORE: 73
ADLS_ACUITY_SCORE: 75
ADLS_ACUITY_SCORE: 55
ADLS_ACUITY_SCORE: 69
ADLS_ACUITY_SCORE: 73
DRESSING/BATHING_DIFFICULTY: YES
ADLS_ACUITY_SCORE: 80
ADLS_ACUITY_SCORE: 45
ADLS_ACUITY_SCORE: 45
ADLS_ACUITY_SCORE: 71
ADLS_ACUITY_SCORE: 78
ADLS_ACUITY_SCORE: 39
ADLS_ACUITY_SCORE: 71
ADLS_ACUITY_SCORE: 45
ADLS_ACUITY_SCORE: 45
ADLS_ACUITY_SCORE: 69
ADLS_ACUITY_SCORE: 76
ADLS_ACUITY_SCORE: 67
ADLS_ACUITY_SCORE: 75
ADLS_ACUITY_SCORE: 72
ADLS_ACUITY_SCORE: 75
ADLS_ACUITY_SCORE: 43
ADLS_ACUITY_SCORE: 61
ADLS_ACUITY_SCORE: 77
ADLS_ACUITY_SCORE: 74
ADLS_ACUITY_SCORE: 77
ADLS_ACUITY_SCORE: 67
ADLS_ACUITY_SCORE: 45
DIFFICULTY_EATING/SWALLOWING: YES
ADLS_ACUITY_SCORE: 78
ADLS_ACUITY_SCORE: 45
ADLS_ACUITY_SCORE: 61
ADLS_ACUITY_SCORE: 63
ADLS_ACUITY_SCORE: 39
ADLS_ACUITY_SCORE: 45
ADLS_ACUITY_SCORE: 79
TRANSFERRING: 2-->COMPLETELY DEPENDENT
DRESS: 2-->COMPLETELY DEPENDENT
ADLS_ACUITY_SCORE: 69
ADLS_ACUITY_SCORE: 43
ADLS_ACUITY_SCORE: 67
ADLS_ACUITY_SCORE: 80
ADLS_ACUITY_SCORE: 71
ADLS_ACUITY_SCORE: 55
ADLS_ACUITY_SCORE: 75
ADLS_ACUITY_SCORE: 75
ADLS_ACUITY_SCORE: 73
ADLS_ACUITY_SCORE: 79
ADLS_ACUITY_SCORE: 70
ADLS_ACUITY_SCORE: 35
ADLS_ACUITY_SCORE: 77
ADLS_ACUITY_SCORE: 76
ADLS_ACUITY_SCORE: 70
ADLS_ACUITY_SCORE: 55
TOILETING_ASSISTANCE: TOILETING DIFFICULTY, DEPENDENT
ADLS_ACUITY_SCORE: 76
ADLS_ACUITY_SCORE: 67
ADLS_ACUITY_SCORE: 35
ADLS_ACUITY_SCORE: 35
ADLS_ACUITY_SCORE: 79
CONCENTRATING,_REMEMBERING_OR_MAKING_DECISIONS_DIFFICULTY: YES
ADLS_ACUITY_SCORE: 75
ADLS_ACUITY_SCORE: 76
ADLS_ACUITY_SCORE: 79
ADLS_ACUITY_SCORE: 70
ADLS_ACUITY_SCORE: 35
ADLS_ACUITY_SCORE: 74
ADLS_ACUITY_SCORE: 39
ADLS_ACUITY_SCORE: 76
ADLS_ACUITY_SCORE: 75
ADLS_ACUITY_SCORE: 70
ADLS_ACUITY_SCORE: 35
EATING: 2-->COMPLETELY DEPENDENT (NOT DEVELOPMENTALLY APPROPRIATE)
ADLS_ACUITY_SCORE: 76
ADLS_ACUITY_SCORE: 69
ADLS_ACUITY_SCORE: 78
ADLS_ACUITY_SCORE: 72
ADLS_ACUITY_SCORE: 80
ADLS_ACUITY_SCORE: 74
TOILETING: 2-->COMPLETELY DEPENDENT
ADLS_ACUITY_SCORE: 79
ADLS_ACUITY_SCORE: 55
ADLS_ACUITY_SCORE: 79
ADLS_ACUITY_SCORE: 69
ADLS_ACUITY_SCORE: 45
ADLS_ACUITY_SCORE: 67
ADLS_ACUITY_SCORE: 43
ADLS_ACUITY_SCORE: 79
ADLS_ACUITY_SCORE: 77
ADLS_ACUITY_SCORE: 73
CHANGE_IN_FUNCTIONAL_STATUS_SINCE_ONSET_OF_CURRENT_ILLNESS/INJURY: YES
ADLS_ACUITY_SCORE: 39
ADLS_ACUITY_SCORE: 79
ADLS_ACUITY_SCORE: 67
CHANGE_IN_FUNCTIONAL_STATUS_SINCE_ONSET_OF_CURRENT_ILLNESS/INJURY: YES
ADLS_ACUITY_SCORE: 75
ADLS_ACUITY_SCORE: 43
ADLS_ACUITY_SCORE: 76
ADLS_ACUITY_SCORE: 67
ADLS_ACUITY_SCORE: 67
ADLS_ACUITY_SCORE: 74
ADLS_ACUITY_SCORE: 76
ADLS_ACUITY_SCORE: 75
ADLS_ACUITY_SCORE: 67
ADLS_ACUITY_SCORE: 67
ADLS_ACUITY_SCORE: 76
DRESSING/BATHING: DRESSING DIFFICULTY, DEPENDENT
ADLS_ACUITY_SCORE: 78
ADLS_ACUITY_SCORE: 75
ADLS_ACUITY_SCORE: 55
ADLS_ACUITY_SCORE: 61
ADLS_ACUITY_SCORE: 76
ADLS_ACUITY_SCORE: 76
ADLS_ACUITY_SCORE: 39
ADLS_ACUITY_SCORE: 80
ADLS_ACUITY_SCORE: 72
ADLS_ACUITY_SCORE: 35
ADLS_ACUITY_SCORE: 65
ADLS_ACUITY_SCORE: 69
ADLS_ACUITY_SCORE: 77
ADLS_ACUITY_SCORE: 39
ADLS_ACUITY_SCORE: 78
ADLS_ACUITY_SCORE: 69
ADLS_ACUITY_SCORE: 80
ADLS_ACUITY_SCORE: 39
ADLS_ACUITY_SCORE: 69
ADLS_ACUITY_SCORE: 65
ADLS_ACUITY_SCORE: 74
ADLS_ACUITY_SCORE: 80
ADLS_ACUITY_SCORE: 78
ADLS_ACUITY_SCORE: 71
ADLS_ACUITY_SCORE: 70
ADLS_ACUITY_SCORE: 63
ADLS_ACUITY_SCORE: 71
ADLS_ACUITY_SCORE: 67
WALKING_OR_CLIMBING_STAIRS_DIFFICULTY: YES
ADLS_ACUITY_SCORE: 70
ADLS_ACUITY_SCORE: 67
ADLS_ACUITY_SCORE: 77
ADLS_ACUITY_SCORE: 35
ADLS_ACUITY_SCORE: 80
ADLS_ACUITY_SCORE: 76
ADLS_ACUITY_SCORE: 69
ADLS_ACUITY_SCORE: 76
ADLS_ACUITY_SCORE: 71
ADLS_ACUITY_SCORE: 69
ADLS_ACUITY_SCORE: 35
ADLS_ACUITY_SCORE: 39
ADLS_ACUITY_SCORE: 71
ADLS_ACUITY_SCORE: 75
WEAR_GLASSES_OR_BLIND: YES
ADLS_ACUITY_SCORE: 39
ADLS_ACUITY_SCORE: 55
ADLS_ACUITY_SCORE: 75
ADLS_ACUITY_SCORE: 45
ADLS_ACUITY_SCORE: 35
ADLS_ACUITY_SCORE: 67
ADLS_ACUITY_SCORE: 67
ADLS_ACUITY_SCORE: 80
ADLS_ACUITY_SCORE: 75
ADLS_ACUITY_SCORE: 63
ADLS_ACUITY_SCORE: 73
ADLS_ACUITY_SCORE: 67
ADLS_ACUITY_SCORE: 70
ADLS_ACUITY_SCORE: 78
ADLS_ACUITY_SCORE: 72
WEAR_GLASSES_OR_BLIND: YES
ADLS_ACUITY_SCORE: 72
ADLS_ACUITY_SCORE: 73
ADLS_ACUITY_SCORE: 67
ADLS_ACUITY_SCORE: 67
ADLS_ACUITY_SCORE: 45
ADLS_ACUITY_SCORE: 76
DOING_ERRANDS_INDEPENDENTLY_DIFFICULTY: YES
TOILETING_ASSISTANCE: TOILETING DIFFICULTY, REQUIRES EQUIPMENT
ADLS_ACUITY_SCORE: 74
ADLS_ACUITY_SCORE: 78
ADLS_ACUITY_SCORE: 35
ADLS_ACUITY_SCORE: 80
ADLS_ACUITY_SCORE: 70
ADLS_ACUITY_SCORE: 74
ADLS_ACUITY_SCORE: 75
ADLS_ACUITY_SCORE: 74
ADLS_ACUITY_SCORE: 74
ADLS_ACUITY_SCORE: 78
ADLS_ACUITY_SCORE: 78
ADLS_ACUITY_SCORE: 75
ADLS_ACUITY_SCORE: 45
ADLS_ACUITY_SCORE: 70
ADLS_ACUITY_SCORE: 78
ADLS_ACUITY_SCORE: 67
ADLS_ACUITY_SCORE: 75
CONCENTRATING,_REMEMBERING_OR_MAKING_DECISIONS_DIFFICULTY: YES
ADLS_ACUITY_SCORE: 71
ADLS_ACUITY_SCORE: 76
ADLS_ACUITY_SCORE: 77
ADLS_ACUITY_SCORE: 69
ADLS_ACUITY_SCORE: 69
SWALLOWING: 2-->DIFFICULTY SWALLOWING LIQUIDS/FOODS
ADLS_ACUITY_SCORE: 69
ADLS_ACUITY_SCORE: 76
DRESSING/BATHING_DIFFICULTY: YES
ADLS_ACUITY_SCORE: 75
ADLS_ACUITY_SCORE: 75
CONCENTRATING,_REMEMBERING_OR_MAKING_DECISIONS_DIFFICULTY: YES
DRESSING/BATHING_MANAGEMENT: DEPENDENT
ADLS_ACUITY_SCORE: 71
ADLS_ACUITY_SCORE: 69
ADLS_ACUITY_SCORE: 78
ADLS_ACUITY_SCORE: 77
ADLS_ACUITY_SCORE: 80
ADLS_ACUITY_SCORE: 71
ADLS_ACUITY_SCORE: 70
TRANSFERRING: 2-->COMPLETELY DEPENDENT
ADLS_ACUITY_SCORE: 73
ADLS_ACUITY_SCORE: 80
ADLS_ACUITY_SCORE: 74
ADLS_ACUITY_SCORE: 67
SWALLOWING: 2-->DIFFICULTY SWALLOWING LIQUIDS/FOODS
ADLS_ACUITY_SCORE: 79
ADLS_ACUITY_SCORE: 71
ADLS_ACUITY_SCORE: 76
ADLS_ACUITY_SCORE: 80
TOILETING: 2-->COMPLETELY DEPENDENT (NOT DEVELOPMENTALLY APPROPRIATE)
ADLS_ACUITY_SCORE: 45
ADLS_ACUITY_SCORE: 71
ADLS_ACUITY_SCORE: 76
ADLS_ACUITY_SCORE: 75
ADLS_ACUITY_SCORE: 35
ADLS_ACUITY_SCORE: 45
TOILETING_ISSUES: YES
ADLS_ACUITY_SCORE: 71
ADLS_ACUITY_SCORE: 70
ADLS_ACUITY_SCORE: 74
ADLS_ACUITY_SCORE: 70
ADLS_ACUITY_SCORE: 70
ADLS_ACUITY_SCORE: 72
TOILETING: 2-->COMPLETELY DEPENDENT
ADLS_ACUITY_SCORE: 69
ADLS_ACUITY_SCORE: 79
TRANSFERRING: 2-->COMPLETELY DEPENDENT (NOT DEVELOPMENTALLY APPROPRIATE)
ADLS_ACUITY_SCORE: 74
EQUIPMENT_CURRENTLY_USED_AT_HOME: LIFT DEVICE;WHEELCHAIR, MANUAL
ADLS_ACUITY_SCORE: 55
ADLS_ACUITY_SCORE: 67
ADLS_ACUITY_SCORE: 41
ADLS_ACUITY_SCORE: 74
ADLS_ACUITY_SCORE: 74
ADLS_ACUITY_SCORE: 75
ADLS_ACUITY_SCORE: 75
ADLS_ACUITY_SCORE: 45
ADLS_ACUITY_SCORE: 69
ADLS_ACUITY_SCORE: 39
BATHING: 2-->COMPLETELY DEPENDENT (NOT DEVELOPMENTALLY APPROPRIATE)
ADLS_ACUITY_SCORE: 35
ADLS_ACUITY_SCORE: 71
ADLS_ACUITY_SCORE: 67
ADLS_ACUITY_SCORE: 43
EQUIPMENT_CURRENTLY_USED_AT_HOME: LIFT DEVICE;WHEELCHAIR, MANUAL
ADLS_ACUITY_SCORE: 39
ADLS_ACUITY_SCORE: 35
WALKING_OR_CLIMBING_STAIRS_DIFFICULTY: YES
WALKING_OR_CLIMBING_STAIRS_DIFFICULTY: YES
ADLS_ACUITY_SCORE: 55
ADLS_ACUITY_SCORE: 77
ADLS_ACUITY_SCORE: 45
ADLS_ACUITY_SCORE: 79
EATING: 2-->COMPLETELY DEPENDENT
ADLS_ACUITY_SCORE: 76
ADLS_ACUITY_SCORE: 74
ADLS_ACUITY_SCORE: 71
ADLS_ACUITY_SCORE: 67
ADLS_ACUITY_SCORE: 45
ADLS_ACUITY_SCORE: 39
ADLS_ACUITY_SCORE: 72
ADLS_ACUITY_SCORE: 69
ADLS_ACUITY_SCORE: 74
ADLS_ACUITY_SCORE: 35
ADLS_ACUITY_SCORE: 67
ADLS_ACUITY_SCORE: 67
ADLS_ACUITY_SCORE: 70
ADLS_ACUITY_SCORE: 74
ADLS_ACUITY_SCORE: 47
ADLS_ACUITY_SCORE: 71
ADLS_ACUITY_SCORE: 39
ADLS_ACUITY_SCORE: 35
ADLS_ACUITY_SCORE: 71
ADLS_ACUITY_SCORE: 71
ADLS_ACUITY_SCORE: 35
ADLS_ACUITY_SCORE: 70
ADLS_ACUITY_SCORE: 67
ADLS_ACUITY_SCORE: 67
ADLS_ACUITY_SCORE: 45
ADLS_ACUITY_SCORE: 47
ADLS_ACUITY_SCORE: 76
ADLS_ACUITY_SCORE: 78
ADLS_ACUITY_SCORE: 76
ADLS_ACUITY_SCORE: 71
ADLS_ACUITY_SCORE: 71
ADLS_ACUITY_SCORE: 67
ADLS_ACUITY_SCORE: 80
ADLS_ACUITY_SCORE: 71
ADLS_ACUITY_SCORE: 45
ADLS_ACUITY_SCORE: 77
ADLS_ACUITY_SCORE: 79
ADLS_ACUITY_SCORE: 80
ADLS_ACUITY_SCORE: 76
ADLS_ACUITY_SCORE: 75
ADLS_ACUITY_SCORE: 67
ADLS_ACUITY_SCORE: 51
ADLS_ACUITY_SCORE: 75
ADLS_ACUITY_SCORE: 67
DOING_ERRANDS_INDEPENDENTLY_DIFFICULTY: YES
ADLS_ACUITY_SCORE: 77
ADLS_ACUITY_SCORE: 35
ADLS_ACUITY_SCORE: 78
ADLS_ACUITY_SCORE: 75
ADLS_ACUITY_SCORE: 80
ADLS_ACUITY_SCORE: 72
ADLS_ACUITY_SCORE: 78
ADLS_ACUITY_SCORE: 72
ADLS_ACUITY_SCORE: 76
ADLS_ACUITY_SCORE: 71
ADLS_ACUITY_SCORE: 47
ADLS_ACUITY_SCORE: 74
ADLS_ACUITY_SCORE: 75
ADLS_ACUITY_SCORE: 78
ADLS_ACUITY_SCORE: 35
ADLS_ACUITY_SCORE: 35
ADLS_ACUITY_SCORE: 77
ADLS_ACUITY_SCORE: 75
ADLS_ACUITY_SCORE: 47
ADLS_ACUITY_SCORE: 45
ADLS_ACUITY_SCORE: 72
DIFFICULTY_EATING/SWALLOWING: YES
EATING/SWALLOWING: OTHER (SEE COMMENTS)
ADLS_ACUITY_SCORE: 80
ADLS_ACUITY_SCORE: 35
ADLS_ACUITY_SCORE: 74
ADLS_ACUITY_SCORE: 76
ADLS_ACUITY_SCORE: 78
FALL_HISTORY_WITHIN_LAST_SIX_MONTHS: NO
ADLS_ACUITY_SCORE: 75
ADLS_ACUITY_SCORE: 39
ADLS_ACUITY_SCORE: 69
ADLS_ACUITY_SCORE: 75
ADLS_ACUITY_SCORE: 69
ADLS_ACUITY_SCORE: 70
ADLS_ACUITY_SCORE: 45
ADLS_ACUITY_SCORE: 61
ADLS_ACUITY_SCORE: 67
ADLS_ACUITY_SCORE: 65
DEPENDENT_IADLS:: CLEANING;COOKING;LAUNDRY;SHOPPING;MEAL PREPARATION;MEDICATION MANAGEMENT;TRANSPORTATION;INCONTINENCE
ADLS_ACUITY_SCORE: 35
DRESSING/BATHING_DIFFICULTY: YES
ADLS_ACUITY_SCORE: 76
ADLS_ACUITY_SCORE: 69
ADLS_ACUITY_SCORE: 67
ADLS_ACUITY_SCORE: 39
ADLS_ACUITY_SCORE: 69
ADLS_ACUITY_SCORE: 67
ADLS_ACUITY_SCORE: 75
ADLS_ACUITY_SCORE: 75
ADLS_ACUITY_SCORE: 78
ADLS_ACUITY_SCORE: 35
ADLS_ACUITY_SCORE: 39
ADLS_ACUITY_SCORE: 74
ADLS_ACUITY_SCORE: 78
ADLS_ACUITY_SCORE: 72
ADLS_ACUITY_SCORE: 35
HEARING_DIFFICULTY_OR_DEAF: NO
ADLS_ACUITY_SCORE: 80
ADLS_ACUITY_SCORE: 35
ADLS_ACUITY_SCORE: 75
ADLS_ACUITY_SCORE: 67
ADLS_ACUITY_SCORE: 45
ADLS_ACUITY_SCORE: 55
ADLS_ACUITY_SCORE: 39
ADLS_ACUITY_SCORE: 76
ADLS_ACUITY_SCORE: 47
ADLS_ACUITY_SCORE: 31
ADLS_ACUITY_SCORE: 35
ADLS_ACUITY_SCORE: 45
ADLS_ACUITY_SCORE: 35
ADLS_ACUITY_SCORE: 73
ADLS_ACUITY_SCORE: 67
ADLS_ACUITY_SCORE: 69
ADLS_ACUITY_SCORE: 77
ADLS_ACUITY_SCORE: 76
ADLS_ACUITY_SCORE: 75
ADLS_ACUITY_SCORE: 72
ADLS_ACUITY_SCORE: 72
ADLS_ACUITY_SCORE: 78
WALKING_OR_CLIMBING_STAIRS: STAIR CLIMBING DIFFICULTY, REQUIRES EQUIPMENT
ADLS_ACUITY_SCORE: 71
ADLS_ACUITY_SCORE: 77
ADLS_ACUITY_SCORE: 78
ADLS_ACUITY_SCORE: 76
ADLS_ACUITY_SCORE: 45
ADLS_ACUITY_SCORE: 71
TRANSFERRING: 2-->COMPLETELY DEPENDENT (NOT DEVELOPMENTALLY APPROPRIATE)
ADLS_ACUITY_SCORE: 71

## 2023-01-01 ASSESSMENT — ENCOUNTER SYMPTOMS
CHOKING: 0
COUGH: 0
UNEXPECTED WEIGHT CHANGE: 1
WOUND: 1
ABDOMINAL PAIN: 0
DIARRHEA: 0
TROUBLE SWALLOWING: 0
CONSTIPATION: 0
NAUSEA: 0

## 2023-01-01 ASSESSMENT — COPD QUESTIONNAIRES
COPD: 1
CAT_SEVERITY: MILD
CAT_SEVERITY: MILD
COPD: 1

## 2023-01-01 ASSESSMENT — LIFESTYLE VARIABLES
TOBACCO_USE: 1
TOBACCO_USE: 1

## 2023-01-03 NOTE — TELEPHONE ENCOUNTER
[facility] Triage Nurse Telephone Encounter    Provider: Tami Rivero MD  Facility: [facility] Facility Type:  [facility]    Caller: Uzma  Call Back Number: [phone]    Allergies:    Allergies   Allergen Reactions     Dust Mites Other (See Comments)     Sneezing runny eyes and nose.     Food Allergy Formula Hives     Mountain Dew and Walnuts     Pollen Extract Other (See Comments)     Sneezing runny eyes and nose.        Reason for call: Nurse is reporting that patient was confused and seeing things crawling on her yesterday evening.  Over the night, patient slept well.  Today, patient is confused, yelling out, and seeing people in her room.  Patient refused to eat breakfast or drink her Glucerna.  She did take her meds with yogurt today.  VS this AM:  T=96.4, P=86, R=18, ZO=959/89, O2=97% RA, WT=953.      Addendum:  Patient continues to have visual hallucinations.  VSS.  Patient did drink some water and Glucerna today.      Verbal Order/Direction given by Provider: VS Q shift.  Encourage food and fluids.  Call and update around 2pm.      Addendum:  Risperdal 0.5mg BID x 5 days for delirium.  Check CMP on 1/4/23.      Provider giving Order:  Tami Orourke MD    Verbal Order given to: Uzma Vora RN

## 2023-01-04 PROBLEM — U07.1 INFECTION DUE TO 2019 NOVEL CORONAVIRUS: Status: ACTIVE | Noted: 2023-01-01

## 2023-01-04 PROBLEM — N18.30 CHRONIC KIDNEY DISEASE, STAGE 3 (H): Status: ACTIVE | Noted: 2021-05-10

## 2023-01-04 PROBLEM — L89.159 SACRAL DECUBITUS ULCER: Status: ACTIVE | Noted: 2023-01-01

## 2023-01-04 PROBLEM — N18.2 CHRONIC KIDNEY DISEASE, STAGE 2 (MILD): Status: ACTIVE | Noted: 2021-05-10

## 2023-01-04 PROBLEM — G93.41 ACUTE METABOLIC ENCEPHALOPATHY: Status: ACTIVE | Noted: 2023-01-01

## 2023-01-04 PROBLEM — R13.12 OROPHARYNGEAL DYSPHAGIA: Status: ACTIVE | Noted: 2023-01-01

## 2023-01-04 NOTE — H&P
[facility]    History and Physical - Hospitalist Service       Date of Admission:  1/3/2023    Assessment & Plan      Cristal Preciado is a 73 yo F with h/o CAD, mild cognitive impairment, DM2 with neuropathy/retinopathy/nephropathy, GERD, HTN, HLD, h/o stroke, MDD, and CKD2 who was recently diagnosed with UTI and COVID at [facility] 12/29/22 and presents with ongoing worsening encephalopathy of unclear etiology for the past 1-2 weeks.  Will check head MRI and have neurology evaluate since this seems worse than what was described when she was at [facility] last week.  This could all still be due to multiple stressors affecting a somewhat frail brain (with previous strokes she does have mild cognitive impairment reportedly which is being exacerbated by stress of COVD infection).  Check B12 and folate levels.  Check LYNN.    Med Rec review has been done by pharmacy, but would wait for speech to eval swallow before ordering oral meds.    Principal Problem:    Acute metabolic encephalopathy -unclear etiology.  No reported history currently of focal findings to suggest new stroke.  This may be a combination of things including stress from moving into a new nursing home 1 month ago along with the holidays and getting COVID sometime in the last week or so along with recent UTI that was treated.  Per [facility] she does have some underlying mild cognitive impairment that all these stressors are exacerbating.  But it is reasonable since it is worsening (was admitted to [facility] 12/29/22 with enceph) to check head MRI and have neurology evaluate.    Infection due to 2019 novel coronavirus -diagnosed 12/25/2022 at [facility] - no meds, asymptomatic greater than 9 days so remdesivir would not be of benefit.  If we can verify she had a positive test at the NH on 12/25/22 then could likely discontinue special precautions tomorrow.    Active Problems:    CAD (coronary artery disease) -once speech has  "okayed her swallow then would restart lipitor, metoprolol, and ASA    Diabetic neuropathy/nephropathy/retinopathy - once speech has okayed her swallow then would restart cymbalta    GERD (gastroesophageal reflux disease) - once speech has okayed her swallow then would restart omeprazole    Hypertension - once speech has okayed her swallow then would restart chlorthalidone, metoprolol    Hyperlipidemia - once speech has okayed her swallow then would restart Lipitor    H/O: stroke - once speech has okayed her swallow then would restart aspirin and Lipitor    Depression - once speech has okayed her swallow then would restart Cymbalta    Uncontrolled type 2 diabetes mellitus -she will be n.p.o. so I am going to decrease her Lantus down to 20 units instead of 45 and will hold her short acting insulin along with metformin and Victoza, follow with sliding scale insulin    Chronic kidney disease, stage 2 (mild) -follow with gentle hydration    Sacral decubitus ulcer - seen at [facility] on 12/29/22 - will have WOC reevaluate.    Glaucoma - restart eye drops    Mild cognitive impairment - once speech has okayed her swallow then would restart risperidone         Diet:   npo until seen by speech  DVT Prophylaxis: Enoxaparin (Lovenox) SQ  Cornejo Catheter: Not present  Lines: None     Cardiac Monitoring: None  Code Status:   full    Clinically Significant Risk Factors Present on Admission              # Hypoalbuminemia: Lowest albumin = 3.3 g/dL at 1/3/2023 10:50 PM, will monitor as appropriate          # Overweight: Estimated body mass index is 26.98 kg/m  as calculated from the following:    Height as of 12/20/22: 1.6 m (5' 3\").    Weight as of 12/20/22: 69.1 kg (152 lb 4.8 oz).           Disposition Plan      Expected Discharge Date: 01/05/2023                  Shane Vasquez MD  Hospitalist Service  [facility]  Securely message with [employer] (more info)  Text page via [employer] Paging/Directory "     ______________________________________________________________________    Chief Complaint   Confusion    History is obtained from the electronic health record.  The patient is confused and unable to give history and I am unable to reach the daughter as the phone number listed is incorrect.    History of Present Illness   Cristal Preciado is a 73 yo F with h/o CAD, mild cognitive impairment, DM2 with neuropathy/retinopathy/nephropathy, GERD, HTN, HLD, h/o stroke, MDD, and CKD2 who presents with confusion.  She is a nursing home patient due to previous strokes and had moved into a new nursing home about a month ago.  She normally has reasonably oriented and able to carry on a reasonable conversation prior to a couple weeks ago.  She was reportedly diagnosed with COVID around 12/25/2022 at her nursing facility [facility].  She started developing encephalopathy after that.  She was taken to [facility] on 12/29/2022 with reports of confusion and hallucinations.  It sounds like it was relatively mild and she was mostly oriented on admission.  She was found to have a UTI which was treated and a sacral decub which was assessed by their wound care nurse and a COVID was positive again.  She had some mild acute kidney injury as well which was improved with hydration.  She was there a couple days and seemed to improve so was sent back to nursing home.  Per report then she has worsened since being back of the nursing home and is becoming quite confused and so was brought back to the hospital.  I am unable to get any further history at this time.      Past Medical History    Past Medical History:   Diagnosis Date     AION (anterior ischaemic optic neuropathy), left eye     NAION LE     CAD (coronary artery disease) 3/2009    [facility]; Angio 2013 [facility]- normal coronary arteries     Cataract      CVA (cerebral vascular accident) (H)     admitted at [facility]     on Cymbalta     Diabetes  mellitus, type 2 (H)      Diabetic nephropathy (H)      Diabetic neuropathy (H)     severe     Diabetic retinopathy (H)      GERD (gastroesophageal reflux disease)      Hyperlipidemia      Hypertension     ECHO 2013, TDS, NL EF     POAG (primary open-angle glaucoma)     adv BE     Seasonal allergies      Tubular adenoma of colon     repeat colonoscopy in        Past Surgical History   Past Surgical History:   Procedure Laterality Date     CARDIAC CATHETERIZATION       CATARACT EXTRACTION W/ INTRAOCULAR LENS IMPLANT Bilateral       SECTION        SECTION       COLONOSCOPY  7/15/2013    Tubular adenoma; repeat in 2018;Procedure: COMBINED COLONOSCOPY, SINGLE BIOPSY/POLYPECTOMY BY BIOPSY;;  Surgeon: Don King MD;  Tubular adenoma     COLONOSCOPY N/A 2020    Procedure: COLONOSCOPY;  Surgeon: Sid Amanda MD;  Location: [facility] GI     CORONARY STENT PLACEMENT  2004    RCA     DAVINCI HYSTERECTOMY TOTAL, BILATERAL SALPINGO-OOPHORECTOMY, COMBINED N/A 2019    Procedure: DaVinci Assisted Total Laparoscopic Hysterectomy, Removal Of Both Tubes And Ovaries;  Surgeon: Linh Cardoso MD;  Location: [facility] OR     EXTRACAPSULAR CATARACT EXTRATION WITH INTRAOCULAR LENS IMPLANT  11-10-09, 2-9-10    11-10-09 Lt, 2-9-10 Rt; Left eye 2012     HYSTERECTOMY TOTAL ABDOMINAL, BILATERAL SALPINGO-OOPHORECTOMY, COMBINED  2019    Procedure: DaVinci Assisted Total Laparoscopic Hysterectomy, Removal Of Both Tubes And Ovaries; Surgeon: Linh Cardoso MD; Location: [facility] OR       STENT, CORONARY, DELORES      RCA       Prior to Admission Medications   Prior to Admission Medications   Prescriptions Last Dose Informant Patient Reported? Taking?   ASPIRIN LOW DOSE 81 MG chewable tablet 1/3/2023  Yes Yes   Sig: CHEW AND SWALLOW 1 TAB BY MOUTH ONCE DAILY IN THE MORNING   CHLORTHALIDONE PO 1/3/2023  Yes Yes   Sig: Take 25 mg by mouth every morning    DULoxetine HCl (CYMBALTA PO) 1/3/2023   Yes Yes   Sig: Take 90 mg by mouth every morning    White Petrolatum-Mineral Oil (REFRESH P.M.) OINT Unknown  Yes Yes   Sig: Apply to eye nightly as needed   acetaminophen (TYLENOL) 500 MG tablet 1/3/2023  Yes Yes   Sig: Take 1,000 mg by mouth 3 times daily   atorvastatin (LIPITOR) 80 MG tablet 1/3/2023  No Yes   Sig: Take 1 tablet by mouth daily. Pt needs to have labs done prior to further refills.   bisacodyl (DULCOLAX) 10 MG suppository   Yes Yes   Sig: Place 10 mg rectally daily as needed for constipation   diclofenac (VOLTAREN) 1 % topical gel 1/3/2023  Yes Yes   Sig: Apply 2 g topically 4 times daily   dorzolamide-timolol (COSOPT) 2-0.5 % ophthalmic solution 1/3/2023  No Yes   Sig: Place 1 drop into both eyes 2 times daily   folic acid (FOLVITE) 1 MG tablet 1/3/2023  Yes Yes   Sig: Take 1 mg by mouth every morning    hydrocortisone, Perianal, (HYDROCORTISONE) 2.5 % cream Unknown  No Yes   Sig: Place rectally 2 times daily as needed for hemorrhoids   insulin glargine (SEMGLEE) 100 UNIT/ML vial 1/2/2023  Yes Yes   Sig: Inject 45 Units Subcutaneous At Bedtime   insulin lispro (HUMALOG KWIKPEN) 100 UNIT/ML (1 unit dial) KWIKPEN 1/3/2023  Yes Yes   Sig: Inject Subcutaneous 3 times daily (before meals) 140-189=1 unit  190-239= 2 units  240-289= 3 units  290-339= 4 units  340-399= 5 units  400-999= 6 units   insulin lispro (HUMALOG KWIKPEN) 100 UNIT/ML (1 unit dial) KWIKPEN Past Week  Yes Yes   Sig: Inject Subcutaneous At Bedtime 200-249=1 unit  250-299= 2 units  300-349= 3 units  350-399= 4 units  400-999= 5units   ketoconazole (NIZORAL) 2 % shampoo Past Week  No Yes   Sig: To entire wet scalp and ears and then wash off after 5 minutes three times a week.   Patient taking differently: To entire wet scalp and ears and then wash off after 5 minutes Tuesday and Friday   latanoprost (XALATAN) 0.005 % ophthalmic solution 1/2/2023  No Yes   Sig: Place 1 drop into both eyes At Bedtime   liraglutide (VICTOZA PEN) 18 MG/3ML  solution 1/3/2023  No Yes   Sig: Inject subcu 1.2mg daily.   loperamide (IMODIUM A-D) 2 MG tablet Unknown  Yes Yes   Sig: Take 2 mg by mouth 4 times daily as needed for diarrhea   loratadine (CLARITIN) 10 MG tablet Unknown  Yes Yes   Sig: Take 10 mg by mouth as needed.   metFORMIN (GLUCOPHAGE-XR) 500 MG 24 hr tablet 2023  No Yes   Sig: Take 2 tablets (1,000 mg) by mouth daily (with dinner)   metoprolol tartrate (LOPRESSOR) 100 MG tablet 1/3/2023  Yes Yes   Sig: Take 100 mg by mouth 2 times daily   nitroGLYCERIN (NITROSTAT) 0.4 MG SL tablet Unknown  Yes Yes   Sig: Place 0.4 mg under the tongue every 5 minutes as needed.   olopatadine (PATADAY) 0.2 % ophthalmic solution Unknown  No Yes   Sig: Place 1 drop into both eyes daily For itchy eyes   omeprazole (PRILOSEC) 20 MG DR capsule 1/3/2023  Yes Yes   Sig: Take 20 mg by mouth daily   polyvinyl alcohol (LIQUIFILM TEARS) 1.4 % ophthalmic solution 1/3/2023  Yes Yes   Sig: Place 1 drop into both eyes 3 times daily   risperiDONE (RISPERDAL) 0.5 MG tablet 1/3/2023  Yes Yes   Sig: Take 0.5 mg by mouth 2 times daily X 5 days for delirium   senna-docusate (SENOKOT-S/PERICOLACE) 8.6-50 MG tablet Unknown  No Yes   Sig: Take 2 tablets by mouth 2 times daily as needed for constipation   simethicone (MYLICON) 125 MG chewable tablet Unknown  Yes Yes   Sig: Take 125 mg by mouth 4 times daily as needed After meals and HS prn      Facility-Administered Medications: None        Review of Systems    The 10 point Review of Systems is negative other than noted in the HPI.    Social History   I have reviewed this patient's social history and updated it with pertinent information if needed.  Social History     Tobacco Use     Smoking status: Former     Packs/day: 1.50     Years: 45.00     Pack years: 67.50     Types: Cigarettes     Start date: 1968     Quit date: 3/6/2013     Years since quittin.8     Smokeless tobacco: Never   Vaping Use     Vaping Use: Never used   Substance Use  Topics     Alcohol use: Not Currently     Drug use: Never       Family History   I have reviewed this patient's family history and updated it with pertinent information if needed.  Family History   Problem Relation Age of Onset     Hypertension Mother      Cerebrovascular Disease Mother      Glaucoma Father      Cancer Father      Diabetes Sister      Glaucoma Sister      Cancer - colorectal Other      Cerebrovascular Disease Other      Skin Cancer No family hx of      Melanoma No family hx of      Anesthesia Reaction No family hx of      Deep Vein Thrombosis (DVT) No family hx of      Arthritis Brother      Diabetes Sister      Glaucoma Sister         Physical Exam   Vital Signs: Temp: 98.1  F (36.7  C) Temp src: Temporal BP: (!) 145/70 Pulse: 68   Resp: 16 SpO2: 100 % O2 Device: None (Room air)    Weight: 0 lbs 0 oz    General Appearance:   Elderly female lying confused in bed mildly agitated   Head:    Normocephalic, without obvious abnormality, atraumatic   Eyes:    PERRL, conjunctiva/corneas clear, EOM's intact,both eyes    Ears:    Normal external ear canals no drainage or erythema bilat.   Nose:   Nares normal by gross inspection,  mucosa normal, no drainage or sinus tenderness   Throat:   Lips, mucosa, and tongue normal; teeth and gums normal   Neck:   Supple, symmetrical, trachea midline, no adenopathy;        thyroid:  No enlargement/tenderness/nodules   Back:     Symmetric, no curvature, ROM normal, no CVA tenderness   Lungs:    Clear to auscultation   Chest wall:    No tenderness or deformity   Heart:    Regular rate and rhythm, S1 and S2 normal, I/VI systolic murmur, no rubs, no JVD, no edema   Abdomen:     Soft, non-tender, bowel sounds active all four quadrants,     no masses, no hepatosplenomegaly   Musculoskeletal:   Extremities are warm and non-tender, atraumatic, no joint swelling or tenderness   Pulses:   2+ and symmetric all extremities   Skin:   Skin color, texture, turgor normal, no rashes or  lesions on exposed areas, sacral wound is present, please see nursing assessment for full skin assessment   Neurologic:          Psychiatric:  Very confused, has a few words but most only unintelligible, does not follow any directions or commands, does move her upper extremities spontaneously but I am not seeing her move her lower extremities much.  Downgoing toes bilaterally, noncooperative with motor exam    Affect is agitated         Medical Decision Making       85 MINUTES SPENT BY ME on the date of service doing chart review, history, exam, documentation & further activities per the note.      Data   Imaging results reviewed over the past 24 hrs:   Recent Results (from the past 24 hour(s))   CT Head w/o Contrast    Narrative    EXAM: CT HEAD W/O CONTRAST  LOCATION: [facility]  DATE/TIME: 1/3/2023 8:29 PM    INDICATION: confusion  COMPARISON: CT 12/29/2022  TECHNIQUE: Routine CT Head without IV contrast. Multiplanar reformats. Dose reduction techniques were used.    FINDINGS:  INTRACRANIAL CONTENTS: No intracranial hemorrhage, extraaxial collection, or mass effect.  No CT evidence of acute infarct. Mild presumed chronic small vessel ischemic changes. Mild generalized volume loss. No hydrocephalus.     VISUALIZED ORBITS/SINUSES/MASTOIDS: Bilateral orbital glaucoma drainage device. No acute orbital findings. No paranasal sinus mucosal disease. No middle ear or mastoid effusion.    BONES/SOFT TISSUES: No acute abnormality.      Impression    IMPRESSION:  1.  No CT evidence for acute intracranial process.  2.  Mild presumed chronic microvascular ischemic changes as above.   CT Chest Abdomen Pelvis w/o Contrast    Narrative    EXAM: CT CHEST ABDOMEN PELVIS W/O CONTRAST  LOCATION: [facility]  DATE/TIME: 1/3/2023 8:30 PM    INDICATION: Confusion, dehydration.  COMPARISON: CT abdomen and pelvis 08/12/2013.  TECHNIQUE: CT scan of the chest, abdomen, and pelvis was  performed without IV contrast. Multiplanar reformats were obtained. Dose reduction techniques were used.   CONTRAST: None.    FINDINGS:   LUNGS AND PLEURA: No effusions. No acute airspace disease. Mild left base atelectasis. There are a few small bilateral pulmonary nodules. Solid 0.4 cm right upper lobe nodule, series 3 image 103. There are adjacent tiny nodules in this region with mild   surrounding groundglass opacity as well. Posterior left upper lobe small solid nodule is 0.2 cm image 146.    MEDIASTINUM/AXILLAE: No suspicious lymph node or acute abnormality. Mild thoracic aortic calcifications.    CORONARY ARTERY CALCIFICATION: Severe.    HEPATOBILIARY: Small cholelithiasis. No significant hepatic abnormality at unenhanced scanning.    PANCREAS: Normal.    SPLEEN: Normal.    ADRENAL GLANDS: Normal.    KIDNEYS/BLADDER: No stones or hydronephrosis. No acute abnormality.    BOWEL: No evidence for appendicitis. No bowel obstruction or acute inflammatory change of the bowel. Moderate stool at the rectum.    LYMPH NODES: Normal.    VASCULATURE: Scattered calcifications.    PELVIC ORGANS: No acute abnormality. Hysterectomy.    MUSCULOSKELETAL: No acute abnormality. Stable left sacroiliac sclerosis could represent sacroiliitis.      Impression    IMPRESSION:  1.  No acute abnormality identified.  2.  A few indeterminate small pulmonary nodules, see below for follow-up.  3.  Diffuse coronary artery calcifications.  4.  Small cholelithiasis.    Recommendations for one or multiple incidental lung nodules < 6mm:  Low-Risk Patient: No routine follow-up.  High-Risk Patient: Optional follow-up CT at 12 months; if unchanged, no further follow-up.    *Low Risk: Minimal or absent history of smoking or other known risk factors.  *Nonsolid (ground-glass) or partly solid nodules may require longer follow-up to exclude indolent adenocarcinoma.  1.  *Recommendations based on Guidelines for the Management of Incidental Pulmonary  Nodules Detected at CT: From the Fleischner Society 2017, Radiology 2017.

## 2023-01-04 NOTE — PROGRESS NOTES
"   01/04/23 5423   Appointment Info   Signing Clinician's Name / Credentials (PT) Tammi Dye PT   Rehab Comments (PT) pt on cart in ED, agrees to PT but states she does not walk and spends most of her time in bed   Quick Adds   Quick Adds Certification   Living Environment   People in Home facility resident   Current Living Arrangements extended care facility   Home Accessibility no concerns   Living Environment Comments Pt come from SNF and plans are for her to return there   Self-Care   Current Activity Tolerance poor   Activity/Exercise/Self-Care Comment per pt she receives help with all her cares, and per chart pt is transferred with a mechanical lift   General Information   Onset of Illness/Injury or Date of Surgery 01/03/23   Referring Physician RICHIE Vasquez   Patient/Family Therapy Goals Statement (PT) none stated   Pertinent History of Current Problem (include personal factors and/or comorbidities that impact the POC) 73 yo F with h/o CAD, mild cognitive impairment, DM2 with neuropathy/retinopathy/nephropathy, GERD, HTN, HLD, h/o stroke, MDD, and CKD2 who was recently diagnosed with UTI and COVID at [facility] 12/29/22 and presents with ongoing worsening encephalopathy of unclear etiology for the past 1-2 weeks.   Existing Precautions/Restrictions fall   Cognition   Affect/Mental Status (Cognition) WFL   Orientation Status (Cognition) oriented to;person   Follows Commands (Cognition) follows one-step commands;physical/tactile prompts required;repetition of directions required   Pain Assessment   Patient Currently in Pain No   Range of Motion (ROM)   ROM Comment LE PROM is WFL but \"stiff\"   Strength (Manual Muscle Testing)   Strength Comments pt does not move her LEs on her own at all, will grasp a hand but does not pull or raise arms   Bed Mobility   Bed Mobility supine-sit;sit-supine   Supine-Sit [place] (Bed Mobility) maximum assist (25% patient effort);verbal cues   Sit-Supine [place] (Bed Mobility) " dependent (less than 25% patient effort)   Bed Mobility Limitations cognitive deficits;decreased ability to use arms for pushing/pulling;decreased ability to use legs for bridging/pushing;impaired ability to control trunk for mobility   Impairments Contributing to Impaired Bed Mobility decreased strength;impaired postural control   Assistive Device (Bed Mobility) draw sheet   Comment, (Bed Mobility) pt tries to assist in coming to sit, but needs max assist, is unable to move her legs at all.   Transfers   Comment, (Transfers) did not attempt standing as pt does not stand at baseline and also needs max assist just for sitting   Balance   Balance other (describe)   Sitting Balance: Static poor balance   Balance Comments needs mod/max assist to sit at all times, sat 5 min.   Balance Quick Add Sitting balance: Static   Clinical Impression   Criteria for Skilled Therapeutic Intervention Current level of function same as previous level of function;No significant expected improvement in functional status   PT Diagnosis (PT) impaired functional mobility   Influenced by the following impairments weakness, bed bound   Functional limitations due to impairments all   Clinical Presentation (PT Evaluation Complexity) Stable/Uncomplicated   Clinical Presentation Rationale presents as diagnosed   Clinical Decision Making (Complexity) low complexity   Planned Therapy Interventions (PT)   (eval only)   Anticipated Equipment Needs at Discharge (PT) wheelchair;lift device   Risk & Benefits of therapy have been explained evaluation/treatment results reviewed;patient   PT Total Evaluation Time   PT Eval, Low Complexity Minutes (70905) 15   PT Discharge Planning   PT Plan pt unable to participate with PT, appears to be at baseline, will d/c PT   PT Discharge Recommendation (DC Rec) Long term care facility  (plans are to return there)   PT Brief overview of current status bed mobility and static sitting balance dependent/max assist.   Unable to move LEs, states usually in bed most of the time.

## 2023-01-04 NOTE — ED TRIAGE NOTES
Pt brought in via EMS for hallucination, confusion, stuttering words, unable to visualize family when approached when family visited pt at 6pm. Pt had covid 2 weeks ago. Hx of a stroke 10 years ago with similar symptoms.       Triage Assessment       Row Name 01/03/23 1936       Triage Assessment (Adult)    Airway WDL WDL       Respiratory WDL    Respiratory WDL WDL

## 2023-01-04 NOTE — ED NOTES
Pt was yelling out and banging her hand on the wall. Haldol given and it appears pt is now sleeping.

## 2023-01-04 NOTE — PROGRESS NOTES
[facility] Care Coordination Contact    [facility]  Ambulatory Care Coordination to Inpatient Care Management   Hand-In Communication    Date:  January 4, 2023  Name: Cristal Preciado is enrolled in [facility] Care Coordination program and I am the Lead Care Coordinator.  CC Contact Information:.   Payor Source: Payor: [facility] / Plan: [facility] DUAL / Product Type: HMO /   Current services in place:     Please see the CC Snaphot and Care Management Flowsheets for specific details of this Cristal Preciado care plan.   Additional details/specific concerns r/t this admission:    No additional concerns at this time .    I will follow this admission in Epic. Please feel free to contact me with questions or for further collaboration in discharge planning.      Lois Loera RN  Care Coordinator-Long Term Care  [address] [address]  [email or web address]   [email or web address]     Office: [phone]   Fax: [phone]

## 2023-01-04 NOTE — ED PROVIDER NOTES
"EMERGENCY DEPARTMENT ENCOUNTER      NAME: Cristal Preciado  AGE: 72 year old female  YOB: 1950  MRN: [id number]  EVALUATION DATE & TIME: No admission date for patient encounter.    PCP: Aditi Allen    ED PROVIDER: Shane Beltran M.D.      Chief Complaint   Patient presents with     Hallucination, confusion         IMPRESSION  1. Confusion    2. Dehydration        PLAN  - admit to hospitalist for further care; med/surg obs    ED COURSE & MEDICAL DECISION MAKING    ED Course as of 01/04/23 0041   Tue Jan 03, 2023 1950 72yoF with history of mild cognitive impairment, TY, MDD, CKD, GERD, prior stroke, HTN, recent COVID who was admitted at [facility] from 12/29/22-12/31/22 for UTI with delirium, also COVID+ but felt to be incidental and no COVID symptoms. Discharged back to group home. Presents to the ED today for ongoing confusion. Daughters came and visited and seemed more confused to them. Patient yelling \"Lord have mercy!\" and does not participate further with HPI.    I was called to triage per nurse for concern for stroke; no stroke code activated by me. No focal neuro deficit at this time. Does not answer orientation questions but has clear non-slurred speech, PERRL @ 2mm with CN 2-12 intact, moves all limbs with no drift, follows simple commands. Otherwise has very dry oral mucosa, clear lungs, normal work of breathing, benign abdomen, no peripheral edema, clean dressing over right heel. Doubt stroke; does have delirium though; unclear what baseline is. Daughter states she has been acting like this \"the past 2 weeks or so\". Will obtain broad workup including CT, EKG, blood, urine with likely admission. Will give IVF given clear dehydration clinically. Daughters comfortable with this plan; no further questions at this time.   Wed Jan 04, 2023   0038 Workup overall nonrevealing: CT head/chest/abdomen/pelvis with no acute abnormality or explanatory pathology. Labs with no glaring electrolyte " abnormality, no ALY, mild transaminitis but normal Tbili, no leukocytosis, hemoglobin 10.1, no UTI, normal TSH. Given 2L IVF and rested comfortably in the ED.    Daughter states she seems calmer than earlier but still not herself. Notes she moved to a new nursing home in early December 2022 (to be with her sister), then had acute change to confusion with intermittent hallucinations on 12/20---has not been herself since. Seems subacute. Did not get MRI during recent [facility] admission; no focality to neuro exam though to suggest classic stroke.    Certainly dehydrated clinically on ED arrival today though. Daughter not comfortable with discharge back to nursing home. Consulted hospitalist for admission; they agreed. Patient's daughter understood and agreed with the plan; no further questions at the time of admission.       --------------------------------------------------------------------------------   --------------------------------------------------------------------------------     7:47 PM  I met with the patient for the initial interview and physical examination. Discussed plan for treatment and workup in the ED.      This patient involved a high degree of complexity in medical decision making, as significant risks were present and assessed. Recent encounters & results in medical record reviewed by me.    Broad differential considered for this patient presenting, including but not limited to:  Delirium, infectious process, sepsis, ICH, mass, pneumonia, acute intraabdominal process, ALY, electrolyte derangement, anemia, UTI, COVID    I wore the following PPE during this patient encounter:  N95 mask, face shield w/ eye protection, gloves    See additional MDM below if interested.      MEDICATIONS GIVEN IN THE EMERGENCY DEPARTMENT  Medications   lactated ringers BOLUS 1,000 mL (0 mLs Intravenous Stopped 1/4/23 0004)   lactated ringers BOLUS 1,000 mL (1,000 mLs Intravenous New Bag 1/4/23 0013)                =================================================================      HPI  Patient information was obtained from: Patient's daughter's    Use of : N/A         Cristal Preciado is a 72 year old female with a pertinent history of CVA, CAD, type 2 diabetes mellitus, GERD, HLD, HTN, who presents to this ED by EMS for evaluation of confusion.    Per chart review,  12/29-12/31 ~ Patient admitted to [facility] for evaluation of acute confusion. CTH obtained and was negative for acute abnormality. Patient reported 2 days of dysuria, UA was remarkable for evidence of acute UTI. Patient treated with CTX with clinical improvement. Patient noted to have tested positive for COVID on 12/27/22, no treatment given as none was indicated. Sacral decubitus ulcer on heals bilaterally were evaluated by WOC, no evidence of acute infection. Patient discharged back to her long-term care facility with cephalexin.     Per patient's daughter's,  Patient coming from TCU with increased confusion. Patient has been having worsening confusion and hallucinations since she tested positive for Covid-19 2 weeks ago. Patient is normally alert and oriented at baseline. Patient was at [facility] last week, there were no findings besides dehydration. Patient has a history of stroke and diabetes on insulin.     HPI limited due to mental status change.       REVIEW OF SYSTEMS   Review of Systems   Unable to perform ROS: Mental status change         --------------- MEDICAL HISTORY ---------------  PAST MEDICAL HISTORY:  Reviewed by me.  Past Medical History:   Diagnosis Date     AION (anterior ischaemic optic neuropathy), left eye     NAION LE     CAD (coronary artery disease) 3/2009    [facility]; Angio 2013 - normal coronary arteries     Cataract      CVA (cerebral vascular accident) (H)     admitted at [facility]     Depression     on Cymbalta     Diabetes mellitus, type 2 (H)      Diabetic nephropathy (H)      Diabetic  neuropathy (H)     severe     Diabetic retinopathy (H)      GERD (gastroesophageal reflux disease)      Hyperlipidemia      Hypertension     ECHO 2013, TDS, NL EF     POAG (primary open-angle glaucoma)     adv BE     Seasonal allergies      Tubular adenoma of colon     repeat colonoscopy in      Patient Active Problem List   Diagnosis     Cataract     CAD (coronary artery disease)     Diabetic nephropathy (H)     Diabetic neuropathy (H)     Diabetic retinopathy (H)     GERD (gastroesophageal reflux disease)     Glaucoma     Hypertension     Hyperlipidemia     CVA (cerebral vascular accident) (H)     Anemia     Seasonal allergies     Depression     Tubular adenoma of colon     Leg weakness     Dermatitis, seborrheic     Eczematous dermatitis     History of coronary artery disease     Venous stasis dermatitis of both lower extremities     Uncontrolled type 2 diabetes mellitus     Chronic dermatitis of hands     Neoplasm of uncertain behavior of skin     S/P hysterectomy     Hypomagnesemia     Gout     Diabetic peripheral neuropathy associated with type 2 diabetes mellitus (H)     CKD (chronic kidney disease)     Acute chest pain     Chronic kidney disease, stage 3 (H)       PAST SURGICAL HISTORY:  Reviewed by me.  Past Surgical History:   Procedure Laterality Date     CARDIAC CATHETERIZATION       CATARACT EXTRACTION W/ INTRAOCULAR LENS IMPLANT Bilateral       SECTION        SECTION       COLONOSCOPY  7/15/2013    Tubular adenoma; repeat in ;Procedure: COMBINED COLONOSCOPY, SINGLE BIOPSY/POLYPECTOMY BY BIOPSY;;  Surgeon: Don King MD;  Tubular adenoma     COLONOSCOPY N/A 2020    Procedure: COLONOSCOPY;  Surgeon: Sid Amanda MD;  Location:  GI     CORONARY STENT PLACEMENT  2004    RCA     DAVINCI HYSTERECTOMY TOTAL, BILATERAL SALPINGO-OOPHORECTOMY, COMBINED N/A 2019    Procedure: DaVinci Assisted Total Laparoscopic Hysterectomy, Removal Of Both Tubes  And Ovaries;  Surgeon: Linh Cardoso MD;  Location: [facility] OR     EXTRACAPSULAR CATARACT EXTRATION WITH INTRAOCULAR LENS IMPLANT  11-10-09, 2-9-10    11-10-09 Lt, 2-9-10 Rt; Left eye 8/2012     HYSTERECTOMY TOTAL ABDOMINAL, BILATERAL SALPINGO-OOPHORECTOMY, COMBINED  08/05/2019    Procedure: DaVinci Assisted Total Laparoscopic Hysterectomy, Removal Of Both Tubes And Ovaries; Surgeon: Linh Cardoso MD; Location: [facility] OR       STENT, CORONARY, DELORES  2009    RCA       CURRENT MEDICATIONS:    Reviewed by me.  No current facility-administered medications for this encounter.    Current Outpatient Medications:      acetaminophen (TYLENOL) 500 MG tablet, Take 1,000 mg by mouth 3 times daily, Disp: , Rfl:      ASPIRIN LOW DOSE 81 MG chewable tablet, CHEW AND SWALLOW 1 TAB BY MOUTH ONCE DAILY IN THE MORNING, Disp: , Rfl: 99     atorvastatin (LIPITOR) 80 MG tablet, Take 1 tablet by mouth daily. Pt needs to have labs done prior to further refills., Disp: 90 tablet, Rfl: 0     bisacodyl (DULCOLAX) 10 MG suppository, Place 10 mg rectally daily as needed for constipation, Disp: , Rfl:      CHLORTHALIDONE PO, Take 25 mg by mouth every morning , Disp: , Rfl:      diclofenac (VOLTAREN) 1 % topical gel, Apply 2 g topically 4 times daily, Disp: , Rfl:      dorzolamide-timolol (COSOPT) 2-0.5 % ophthalmic solution, Place 1 drop into both eyes 2 times daily, Disp: 10 mL, Rfl: 3     DULoxetine HCl (CYMBALTA PO), Take 90 mg by mouth every morning , Disp: , Rfl:      folic acid (FOLVITE) 1 MG tablet, Take 1 mg by mouth every morning , Disp: , Rfl:      hydrocortisone, Perianal, (HYDROCORTISONE) 2.5 % cream, Place rectally 2 times daily as needed for hemorrhoids, Disp: 60 g, Rfl: 0     insulin glargine (SEMGLEE) 100 UNIT/ML vial, Inject 45 Units Subcutaneous At Bedtime, Disp: , Rfl:      insulin lispro (HUMALOG KWIKPEN) 100 UNIT/ML (1 unit dial) KWIKPEN, Inject Subcutaneous 3 times daily (before meals) 140-189=1 unit 190-239= 2 units  240-289= 3 units 290-339= 4 units 340-399= 5 units 400-999= 6 units, Disp: , Rfl:      insulin lispro (HUMALOG KWIKPEN) 100 UNIT/ML (1 unit dial) KWIKPEN, Inject Subcutaneous At Bedtime 200-249=1 unit 250-299= 2 units 300-349= 3 units 350-399= 4 units 400-999= 5units, Disp: , Rfl:      ketoconazole (NIZORAL) 2 % shampoo, To entire wet scalp and ears and then wash off after 5 minutes three times a week. (Patient taking differently: To entire wet scalp and ears and then wash off after 5 minutes Tuesday and Friday), Disp: 240 mL, Rfl: 11     latanoprost (XALATAN) 0.005 % ophthalmic solution, Place 1 drop into both eyes At Bedtime, Disp: 5 mL, Rfl: 2     liraglutide (VICTOZA PEN) 18 MG/3ML solution, Inject subcu 1.2mg daily., Disp: 9 mL, Rfl: 1     loperamide (IMODIUM A-D) 2 MG tablet, Take 2 mg by mouth 4 times daily as needed for diarrhea, Disp: , Rfl:      loratadine (CLARITIN) 10 MG tablet, Take 10 mg by mouth as needed., Disp: , Rfl:      metFORMIN (GLUCOPHAGE-XR) 500 MG 24 hr tablet, Take 2 tablets (1,000 mg) by mouth daily (with dinner), Disp: 180 tablet, Rfl: 3     metoprolol tartrate (LOPRESSOR) 100 MG tablet, Take 100 mg by mouth 2 times daily, Disp: , Rfl:      nitroGLYCERIN (NITROSTAT) 0.4 MG SL tablet, Place 0.4 mg under the tongue every 5 minutes as needed., Disp: , Rfl:      olopatadine (PATADAY) 0.2 % ophthalmic solution, Place 1 drop into both eyes daily For itchy eyes, Disp: 2.5 mL, Rfl: 3     omeprazole (PRILOSEC) 20 MG DR capsule, Take 20 mg by mouth daily, Disp: , Rfl:      polyvinyl alcohol (LIQUIFILM TEARS) 1.4 % ophthalmic solution, Place 1 drop into both eyes 3 times daily, Disp: , Rfl:      risperiDONE (RISPERDAL) 0.5 MG tablet, Take 0.5 mg by mouth 2 times daily X 5 days for delirium, Disp: , Rfl:      senna-docusate (SENOKOT-S/PERICOLACE) 8.6-50 MG tablet, Take 2 tablets by mouth 2 times daily as needed for constipation, Disp: 60 tablet, Rfl: 0     simethicone (MYLICON) 125 MG chewable  tablet, Take 125 mg by mouth 4 times daily as needed After meals and HS prn, Disp: , Rfl:      White Petrolatum-Mineral Oil (REFRESH P.M.) OINT, Apply to eye nightly as needed, Disp: , Rfl:     ALLERGIES:  Reviewed by me.  Allergies   Allergen Reactions     Dust Mites Other (See Comments)     Sneezing runny eyes and nose.     Food Allergy Formula Hives     Mountain Dew and Walnuts     Pollen Extract Other (See Comments)     Sneezing runny eyes and nose.       FAMILY HISTORY:  Reviewed by me.  Family History   Problem Relation Age of Onset     Hypertension Mother      Cerebrovascular Disease Mother      Glaucoma Father      Cancer Father      Diabetes Sister      Glaucoma Sister      Cancer - colorectal Other      Cerebrovascular Disease Other      Skin Cancer No family hx of      Melanoma No family hx of      Anesthesia Reaction No family hx of      Deep Vein Thrombosis (DVT) No family hx of      Arthritis Brother      Diabetes Sister      Glaucoma Sister        SOCIAL HISTORY:   Reviewed by me.  Social History     Socioeconomic History     Marital status: Single     Number of children: 5   Tobacco Use     Smoking status: Former     Packs/day: 1.50     Years: 45.00     Pack years: 67.50     Types: Cigarettes     Start date: 1968     Quit date: 3/6/2013     Years since quittin.8     Smokeless tobacco: Never   Vaping Use     Vaping Use: Never used   Substance and Sexual Activity     Alcohol use: Not Currently     Drug use: Never     Sexual activity: Not Currently   Social History Narrative    Pt has three daughters and two sons, single.  Her daughter Court, see's her often at the Nursing Home ([employer]).  Moved into Nursing Home in 2011 after a hospitalization for ALY.  Moved from [place] in  to [facility]. Has 5 grandchildren.       --------------- PHYSICAL EXAM ---------------  Nursing notes and vitals reviewed by me.  VITALS:  Vitals:    23 1954 23 2254 23 0022   BP:  "(!) 135/90 (!) 141/73 (!) 145/70   Pulse: 66 64 68   Resp: 22 16 16   Temp: 98.1  F (36.7  C)     TempSrc: Temporal     SpO2: 99% 100% 100%       PHYSICAL EXAM:    General:  alert, interactive, no distress  Eyes:  conjunctivae clear, conjugate gaze, PERRL @ 2mm with EOMI  HENT:  atraumatic, nose with no rhinorrhea, oropharynx with very dry mucous membranes  Neck:  no meningismus  Cardiovascular:  HR 60s during exam, regular rhythm, no murmurs, brisk cap refill  Chest:  no chest wall tenderness  Pulmonary:  no stridor, normal phonation, normal work of breathing, clear lungs bilaterally  Abdomen:  soft, nondistended, nontender  :  no CVA tenderness  Back:  no midline spinal tenderness  Musculoskeletal:  no pretibial edema, no calf tenderness. Gross ROM intact to joints of extremities with no obvious deformities.  Skin:  warm, dry, no rash  Neuro:  awake, alert, does not answer questions but will say \"Lord have mercy!\" with no slurred speech, no facial droop, negative pronator drift, moves all limbs  Psych:  calm, normal affect      --------------- RESULTS ---------------  EKG:    Reviewed and interpreted by me.  - NSR at 62bpm, no ST changes, small symmetric TWI in aVL, normal intervals  - new high lateral TWI compared to prior on 7/15/19  My read.    LAB:  Reviewed and interpreted by me.  Results for orders placed or performed during the hospital encounter of 01/03/23   CT Head w/o Contrast    Impression    IMPRESSION:  1.  No CT evidence for acute intracranial process.  2.  Mild presumed chronic microvascular ischemic changes as above.   CT Chest Abdomen Pelvis w/o Contrast    Impression    IMPRESSION:  1.  No acute abnormality identified.  2.  A few indeterminate small pulmonary nodules, see below for follow-up.  3.  Diffuse coronary artery calcifications.  4.  Small cholelithiasis.    Recommendations for one or multiple incidental lung nodules < 6mm:  Low-Risk Patient: No routine follow-up.  High-Risk Patient: " Optional follow-up CT at 12 months; if unchanged, no further follow-up.    *Low Risk: Minimal or absent history of smoking or other known risk factors.  *Nonsolid (ground-glass) or partly solid nodules may require longer follow-up to exclude indolent adenocarcinoma.  1.  *Recommendations based on Guidelines for the Management of Incidental Pulmonary Nodules Detected at CT: From the Fleischner Society 2017, Radiology 2017.   Basic metabolic panel   Result Value Ref Range    Sodium 140 136 - 145 mmol/L    Potassium 3.9 3.4 - 5.3 mmol/L    Chloride 105 98 - 107 mmol/L    Carbon Dioxide (CO2) 20 (L) 22 - 29 mmol/L    Anion Gap 15 7 - 15 mmol/L    Urea Nitrogen 29.3 (H) 8.0 - 23.0 mg/dL    Creatinine 0.94 0.51 - 0.95 mg/dL    Calcium 9.4 8.8 - 10.2 mg/dL    Glucose 218 (H) 70 - 99 mg/dL    GFR Estimate 65 >60 mL/min/1.73m2   Hepatic function panel   Result Value Ref Range    Protein Total 7.1 6.4 - 8.3 g/dL    Albumin 3.3 (L) 3.5 - 5.2 g/dL    Bilirubin Total 0.3 <=1.2 mg/dL    Alkaline Phosphatase 194 (H) 35 - 104 U/L    AST 76 (H) 10 - 35 U/L    ALT 60 (H) 10 - 35 U/L    Bilirubin Direct <0.20 0.00 - 0.30 mg/dL   Result Value Ref Range    Magnesium 1.9 1.7 - 2.3 mg/dL   TSH with free T4 reflex   Result Value Ref Range    TSH 1.15 0.30 - 4.20 uIU/mL   UA with Microscopic reflex to Culture    Specimen: Urine, Straight Catheter   Result Value Ref Range    Color Urine Yellow Colorless, Straw, Light Yellow, Yellow    Appearance Urine Clear Clear    Glucose Urine 70 (A) Negative mg/dL    Bilirubin Urine Negative Negative    Ketones Urine Negative Negative mg/dL    Specific Gravity Urine 1.029 1.001 - 1.030    Blood Urine Negative Negative    pH Urine 5.0 5.0 - 7.0    Protein Albumin Urine 20 (A) Negative mg/dL    Urobilinogen Urine 2.0 (A) <2.0 mg/dL    Nitrite Urine Negative Negative    Leukocyte Esterase Urine Negative Negative    Bacteria Urine Few (A) None Seen /HPF    RBC Urine 2 <=2 /HPF    WBC Urine 2 <=5 /HPF     Squamous Epithelials Urine <1 <=1 /HPF    Hyaline Casts Urine 68 (H) <=2 /LPF   Result Value Ref Range    CRP Inflammation 9.20 (H) <5.00 mg/L   Result Value Ref Range    Procalcitonin 0.10 (H) <0.05 ng/mL   Glucose by meter   Result Value Ref Range    GLUCOSE BY METER POCT 211 (H) 70 - 99 mg/dL   CBC with platelets and differential   Result Value Ref Range    WBC Count 6.7 4.0 - 11.0 10e3/uL    RBC Count 3.21 (L) 3.80 - 5.20 10e6/uL    Hemoglobin 10.1 (L) 11.7 - 15.7 g/dL    Hematocrit 31.9 (L) 35.0 - 47.0 %    MCV 99 78 - 100 fL    MCH 31.5 26.5 - 33.0 pg    MCHC 31.7 31.5 - 36.5 g/dL    RDW 12.7 10.0 - 15.0 %    Platelet Count 230 150 - 450 10e3/uL    % Neutrophils 57 %    % Lymphocytes 26 %    % Monocytes 15 %    % Eosinophils 2 %    % Basophils 0 %    % Immature Granulocytes 0 %    NRBCs per 100 WBC 0 <1 /100    Absolute Neutrophils 3.8 1.6 - 8.3 10e3/uL    Absolute Lymphocytes 1.8 0.8 - 5.3 10e3/uL    Absolute Monocytes 1.0 0.0 - 1.3 10e3/uL    Absolute Eosinophils 0.1 0.0 - 0.7 10e3/uL    Absolute Basophils 0.0 0.0 - 0.2 10e3/uL    Absolute Immature Granulocytes 0.0 <=0.4 10e3/uL    Absolute NRBCs 0.0 10e3/uL       RADIOLOGY:  Reviewed by me. Please see official radiology report.  Recent Results (from the past 24 hour(s))   CT Head w/o Contrast    Narrative    EXAM: CT HEAD W/O CONTRAST  LOCATION: [facility]  DATE/TIME: 1/3/2023 8:29 PM    INDICATION: confusion  COMPARISON: CT 12/29/2022  TECHNIQUE: Routine CT Head without IV contrast. Multiplanar reformats. Dose reduction techniques were used.    FINDINGS:  INTRACRANIAL CONTENTS: No intracranial hemorrhage, extraaxial collection, or mass effect.  No CT evidence of acute infarct. Mild presumed chronic small vessel ischemic changes. Mild generalized volume loss. No hydrocephalus.     VISUALIZED ORBITS/SINUSES/MASTOIDS: Bilateral orbital glaucoma drainage device. No acute orbital findings. No paranasal sinus mucosal disease. No middle  ear or mastoid effusion.    BONES/SOFT TISSUES: No acute abnormality.      Impression    IMPRESSION:  1.  No CT evidence for acute intracranial process.  2.  Mild presumed chronic microvascular ischemic changes as above.   CT Chest Abdomen Pelvis w/o Contrast    Narrative    EXAM: CT CHEST ABDOMEN PELVIS W/O CONTRAST  LOCATION: [facility]  DATE/TIME: 1/3/2023 8:30 PM    INDICATION: Confusion, dehydration.  COMPARISON: CT abdomen and pelvis 08/12/2013.  TECHNIQUE: CT scan of the chest, abdomen, and pelvis was performed without IV contrast. Multiplanar reformats were obtained. Dose reduction techniques were used.   CONTRAST: None.    FINDINGS:   LUNGS AND PLEURA: No effusions. No acute airspace disease. Mild left base atelectasis. There are a few small bilateral pulmonary nodules. Solid 0.4 cm right upper lobe nodule, series 3 image 103. There are adjacent tiny nodules in this region with mild   surrounding groundglass opacity as well. Posterior left upper lobe small solid nodule is 0.2 cm image 146.    MEDIASTINUM/AXILLAE: No suspicious lymph node or acute abnormality. Mild thoracic aortic calcifications.    CORONARY ARTERY CALCIFICATION: Severe.    HEPATOBILIARY: Small cholelithiasis. No significant hepatic abnormality at unenhanced scanning.    PANCREAS: Normal.    SPLEEN: Normal.    ADRENAL GLANDS: Normal.    KIDNEYS/BLADDER: No stones or hydronephrosis. No acute abnormality.    BOWEL: No evidence for appendicitis. No bowel obstruction or acute inflammatory change of the bowel. Moderate stool at the rectum.    LYMPH NODES: Normal.    VASCULATURE: Scattered calcifications.    PELVIC ORGANS: No acute abnormality. Hysterectomy.    MUSCULOSKELETAL: No acute abnormality. Stable left sacroiliac sclerosis could represent sacroiliitis.      Impression    IMPRESSION:  1.  No acute abnormality identified.  2.  A few indeterminate small pulmonary nodules, see below for follow-up.  3.  Diffuse  coronary artery calcifications.  4.  Small cholelithiasis.    Recommendations for one or multiple incidental lung nodules < 6mm:  Low-Risk Patient: No routine follow-up.  High-Risk Patient: Optional follow-up CT at 12 months; if unchanged, no further follow-up.    *Low Risk: Minimal or absent history of smoking or other known risk factors.  *Nonsolid (ground-glass) or partly solid nodules may require longer follow-up to exclude indolent adenocarcinoma.  1.  *Recommendations based on Guidelines for the Management of Incidental Pulmonary Nodules Detected at CT: From the Fleischner Society 2017, Radiology 2017.         ADDITIONAL MDM:   Medical Decision Making    History:    Supplemental history from: Documented in HPI, if applicable    External Record(s) reviewed: Documented in HPI, if applicable.    Work Up:    Chart documentation includes differential considered and any EKGs or imaging independently interpreted by provider.    In additional to work up documented, I considered the following work up: See chart documentation, if applicable.    External consultation:    Discussion of management with another provider: See chart documentation, if applicable    Complicating factors:    Care impacted by chronic illness: see past medical history above    Disposition considerations: Admit.            I, Lewis Vieira, am serving as a scribe to document services personally performed by Dr. Shane Beltran based on my observation and the provider's statements to me. I, Shane Beltran MD attest that Lewis Vieira is acting in a scribe capacity, has observed my performance of the services and has documented them in accordance with my direction.      Shane Beltran MD  01/03/23  Emergency Medicine  [facility] EMERGENCY DEPARTMENT  [address] [place] [id number]  [phone]  Dept: [phone]     Shane Beltran MD  01/04/23 0044

## 2023-01-04 NOTE — PROGRESS NOTES
TRANSITIONS OF CARE (JOSE D) LOG   JOSE D tasks should be completed by the CC within one (1) business day of notification of each transition. Follow up contact with member is required after return to their usual care setting.  Note:  If CC finds out about the transitions fifteen (15) days or more after the member has returned to their usual care setting, no JOSE D log is needed. However, the CC should check in with the member to discuss the transition process, any changes needed to the care plan and document it in a case note.    Member Name:  Cristal Preciado O Name:  Prashant O/Health Plan Member ID#: [id number]    Product: [facility] Care Coordinator Contact:  MUNIRA Byrne, RN, PHN, [facility] Agency/County/Care System: [facility]   Transition Communication Actions from Care Management Contact   Transition #1   Notification Date: 1/4/2023   Transition Date:   1/3/23 Transition From: Nursing Home, [facility] SNF     Is this the member s usual care setting?               yes Transition To: [facility]   Transition Type:  Unplanned  Reason for Admission/Comments:  Confusion, dehydration  Contact member/responsible party to offer assistance with transition Date completed: 1/4/2023    Notes from conversation with the member/responsible party, provider, discharging and receiving facility (as applicable): CC contacted Hospital /discharge planner via the [employer] Transitional Care Hand-In Process, with community care plan included.  CC reached out to NH regarding transition and offered support as needed.  Reviewed and update care plan as needed.  Notified community service providers and placed services Hospice on hold as needed.  Transition log initiated.   PCP notified of hospitalization via EMR.    Lois Loera RN  Care Coordinator-Long Term Care  [address] [address]  [email or web address]   [email or web address]     Office: [phone]    Fax: [phone]     Shared CC contact info, care plan/services with receiving setting--Date completed: 1/4/2023   Name & Title of receiving setting contact: [facility]   Notified PCP of transition--Date completed:  1/4/2023     via  EMR  Name of PCP: Aditi Allen     Transition #2   Notification Date: 1/9/23 Transition Date:   1/7/23 Transition From: Hospital, [facility]     Is this the member s usual care setting?               no Transition To: Nursing Home, [facility] SNF   Transition Type:  Planned  Reason for Admission/Comments:  Encephalopathy most likely due to the recent illness and metabolic abnormalities  Contact member/responsible party to offer assistance with transition Date completed: 1/9/2023    Notes from conversation with the member/responsible party, provider, discharging and receiving facility (as applicable): CC contacted Hetal GIL LOR at NH states she did return on a puree diet. So NH will obtain ST/PT orders    Member has a follow-up appointment with PCP in 7 days: Yes: scheduled on Yes will see NP at Facility   Member has had a change in condition: No  Home visit needed: No  Care plan reviewed and updated.  The following home based services None were resumed.  New referrals placed: No  Transition log completed.   PCP notified of transition back to home via EMR.    Lois Loera, RN  Care Coordinator-Long Term Care  [address] [address]  [email or web address]   [email or web address]     Office: [phone]   Fax: [phone]     Shared CC contact info, care plan/services with receiving setting--Date completed: 1/9/23   Name & Title of receiving setting contact: [facility]   Notified PCP of transition--Date completed:  1/7/23     via  EMR  Name of PCP: Aditi Allen      *RETURN TO USUAL CARE SETTING: *Complete tasks below when the member is discharging TO their  usual care setting within one (1) business day of notification..      For situations where the Care Coordinator is notified of the discharge prior to the date of discharge, the Care Coordinator must follow up with the member or designated representative to confirm that discharge actually occurred and discuss required JOSE D tasks as outlined in the JOSE D Instructions.  (This includes situations where it may be a  new  usual care setting for the member. (i.e., a community member who decides upon permanent nursing home placement following hospitalization and rehab).    Discuss with Member/Responsible Party:    Check  Yes  - if the member, family member and/or SNF/facility staff manages the following:    If  No  provide explanation in the comments section.          Date completed: 1/9/23 Communicated with member or their designated representative about the following:  care transition process; about changes to the member s health status; plan of care updates; education about transitions and how to prevent unplanned transitions/readmissions    Four Pillars for Optimal Transition:    Check  Yes  - if the member, family member and/or SNF/facility staff manages the following:    If  No  provide explanation in the comments section.          [x]  Yes     []  No Does the member have a follow-up appointment scheduled with primary care or specialist? (Mental health hospitalizations--the appt. should be w/in 7 days)              For mental health hospitalizations:  []  Yes     []  No     Does the member have a follow-up appointment scheduled with a mental health practitioner within 7 days of discharge?  [x]  Yes     []  No     Has a medication review been completed with member? If no, refer to PCP, home care nurse, MTM, pharmacist  [x]  Yes     []  No     Can the member manage their medications or is there a system in place to manage medications (e.g. home care set-up)?         [x]  Yes     []  No     Can the member verbalize warning  signs and symptoms to watch for and how to respond?  [x]  Yes     []  No     Does the member have a copy of and understand their discharge instructions?  If no, assist to obtain copy of discharge instructions, review discharge instructions, and assist to contact PCP to discuss questions about their recent hospitalization.  [x]  Yes     []  No     Does the member have adequate food, housing and transportation?  If no, add goal and discuss additional supports available to the member                                                                                                                                                                                 [x]  Yes     []  No     Is the member safe in their home?  If no, document needs and support provided                                                                                                                                                                          []  Yes     [x]  No     Are there any concerns of vulnerability, abuse, or neglect?  If yes, document concerns and actions taken by Care Coordinator as a mandated                                                                                                                                                                              [x]  Yes     []  No     Does the member use a Personal Health Care Record?  Check  Yes  if visit summary, discharge summary, and/or healthcare summary are being used as a PHR.                                                                                                                                                                                  [x]  Yes     []  No     Have you reviewed the discharge summary with the member? If  No  provide explanation in comments.  [x]  Yes     []  No     Have you updated the member s care plan/support plan? Add new diagnosis, medications, treatments, goals & interventions, as applicable. If No, provide explanation in  comments.    Comments:   LTC resident    Notes from conversation with the member/responsible party, provider, discharging and receiving facility (as applicable): CC contacted Hetal G[facility] LSW She did return on a puree diet. So NH will obtain ST/PT orders   Member has a follow-up appointment with PCP in 7 days: Yes: scheduled on will see NP at facility   Member has had a change in condition: No  Home visit needed: No  Care plan reviewed and updated.  The following home based services None were resumed.  New referrals placed: No  Transition log completed.   PCP notified of transition back to home via EMR.    Lois Loera RN  Care Coordinator-Long Term Care  [address] [address]  [email or web address]   [email or web address]     Office: [phone]   Fax: [phone]

## 2023-01-04 NOTE — CONSULTS
[facility]  WOC Nurse Inpatient Assessment     Consulted for: B heels, Buttocks    Patient History (according to provider note(s):        Areas Assessed:    Unable to assess buttocks today as patient is currently in hallway of ED awaiting a room. Patient documentation of pressure injury to site per H&P. Will assess as soon as able when patient has privacy.     B heels assessed and patient noted to have eschars - dry and intact. R heel measuring 4 cm x 4 cm and L heel measuring 7 cm x 7 cm.    Treatment Plan:     Buttocks - will plan for Mepilex Sacral until in person WOC assessment can be completed.    B heels - float at all times while in bed or recliner. If patient not floating heels well, may order Prevelon boots from stores for patient to use.    Orders: Written    RECOMMEND PRIMARY TEAM ORDER: None, at this time  Education provided: plan of care  Discussed plan of care with: Patient  WOC nurse follow-up plan: twice weekly (next on F)  Notify WOC if wound(s) deteriorate.  Nursing to notify the Provider(s) and re-consult the WOC Nurse if new skin concern.    DATA:     Current support surface: Standard  Other: ED stretcher  Containment of urine/stool: Incontinence Protocol  BMI: There is no height or weight on file to calculate BMI.   Active diet order: Orders Placed This Encounter      Combination Diet Pureed Diet (level 4); Thin Liquids (level 0)     Output: No intake/output data recorded.     Labs: Recent Labs   Lab 01/04/23  0324 01/03/23  2250   ALBUMIN 3.4* 3.3*   HGB 9.9* 10.1*   WBC 6.2 6.7   A1C  --  7.6*   CRP  --  9.20*     Pressure injury risk assessment:                          Tonja Negron RN CWOCN

## 2023-01-04 NOTE — PROGRESS NOTES
01/04/23 0945   Appointment Info   Signing Clinician's Name / Credentials (SLP) Jessee Pope MA [facility] SLP   General Information   Onset of Illness/Injury or Date of Surgery 01/03/23   Referring Physician Dr Vasquez   Patient/Family Therapy Goal Statement (SLP) patient is thirsty, wants to drink, eat   Pertinent History of Current Problem 73 yo F with h/o CAD, mild cognitive impairment, DM2 with neuropathy/retinopathy/nephropathy, GERD, HTN, HLD, h/o stroke, MDD, and CKD2 who was recently diagnosed with UTI and COVID at [facility] 12/29/22 and presents with ongoing worsening encephalopathy of unclear etiology for the past 1-2 weeks.  Will check head MRI and have neurology evaluate since this seems worse than what was described when she was at [facility] last week.  This could all still be due to multiple stressors affecting a somewhat frail brain (with previous strokes she does have mild cognitive impairment reportedly which is being exacerbated by stress of COVD infection).She had outpatient video swallow study done 10/7/22 due to failure to thrive and poor PO intake. There was laryngeal penetration with thin, and mildly thick liquids, no aspiration, pharyngeal stasis increasing with thicker consistencies. It is unclear what her current diet is at SNF. She was also referred to GI and has EGD scheduled for March 2023.   General Observations Patient is awake, confused   Type of Evaluation   Type of Evaluation Swallow Evaluation   Oral Motor   Oral Musculature generally intact   Structural Abnormalities none present   Dentition (Oral Motor)   Dentition (Oral Motor) dental appliance/dentures   Dental Appliance/Denture (Oral Motor) upper;other (see comments)  (lower edentulous)   Facial Symmetry (Oral Motor)   Facial Symmetry (Oral Motor) WNL   Lip Function (Oral Motor)   Lip Range of Motion (Oral Motor) WNL   Lip Strength (Oral Motor) WNL   Lip Coordination (Oral Motor) other (see comments)  (unable to follow directions)    Tongue Function (Oral Motor)   Tongue Strength (Oral Motor) WNL   Tongue Coordination/Speed (Oral Motor) unable/difficult to assess   Tongue ROM (Oral Motor) unable/difficult to assess   Comment, Tongue Function (Oral Motor) speech is clear/intelligible   Jaw Function (Oral Motor)   Jaw Function (Oral Motor) WNL   Cough/Swallow/Gag Reflex (Oral Motor)   Soft Palate/Velum (Oral Motor) unable/difficult to assess   Volitional Swallow (Oral Motor) WNL   Vocal Quality/Secretion Management (Oral Motor)   Vocal Quality (Oral Motor) WNL   Secretion Management (Oral Motor) WNL   General Swallowing Observations   Past History of Dysphagia see above   Respiratory Support (General Swallowing Observations) none   Current Diet/Method of Nutritional Intake (General Swallowing Observations, NIS) NPO   Swallowing Evaluation Clinical swallow evaluation   Clinical Swallow Evaluation   Feeding Assistance dependent   Clinical Swallow Evaluation Textures Trialed thin liquids;soft & bite-sized;pureed   Clinical Swallow Eval: Thin Liquid Texture Trial   Mode of Presentation, Thin Liquids straw   Volume of Liquid or Food Presented 8 oz   Oral Phase of Swallow WFL   Pharyngeal Phase of Swallow intact   Diagnostic Statement patient took single sips and rapid consecutive swallows. She had good oral control no overt s/s aspiration throughout the session.   Clinical Swallow Evaluation: Puree Solid Texture Trial   Mode of Presentation, Puree spoon;fed by clinician   Volume of Puree Presented 4 oz   Oral Phase, Puree delayed AP movement   Pharyngeal Phase, Puree intact   Diagnostic Statement Good oral control, at times held bolus anteriorly before transitioning back to trigger swallow. Once swallow observed there was no oral stasis, no overt s/s aspiration.   Clinical Swallow Eval: Soft & Bite Sized   Mode of Presentation spoon;fed by clinician   Volume Presented 1 oz fruit cup   Oral Phase impaired mastication;effortful AP movement;residue in  oral cavity;delayed AP movement   Oral Residue mid posterior tongue   Pharyngeal Phase intact   Successful Strategies Trialed During Procedure Other (see comments)  (sips of liquid to clear residual, did not clear)   Diagnostic Statement patient actively masticates soft solid, observed swallow response 50% of the time, continuous bolus manipulation noted after masticating, unble to fully transfer AP to trigger swallow, sips of liquid did not clear. This occured repeatedly with multiple trials. She was able to spit out masticated boluses. No overt s/s aspiration when swallowed, however oral phase is not efficient for nutritional intake.   Esophageal Phase of Swallow   Patient reports or presents with symptoms of esophageal dysphagia No   Swallowing Recommendations   Diet Consistency Recommendations pureed (level 4);thin liquids (level 0)   Mode of Delivery Recommendations bolus size, small;slow rate of intake   Monitoring/Assistance Required (Eating/Swallowing) monitor for cough or change in vocal quality with intake   Recommended Feeding/Eating Techniques (Swallow Eval) maintain upright sitting position for eating;minimize distractions during oral intake;provide assist with feeding   Medication Administration Recommendations, Swallowing (SLP) patient preference   Comment, Swallowing Recommendations Due to dehydration and risk for continued poor PO intake, a puree diet and thin liquids is recommended to maximize nutrition and hydration. There was laryngeal penetration but no aspiration on previous swallow study in Oct 2022. Monitor tolerance, respiratory status, and medical improvement.   General Therapy Interventions   Planned Therapy Interventions Dysphagia Treatment   Clinical Impression   Criteria for Skilled Therapeutic Interventions Met (SLP Eval) Yes, treatment indicated   SLP Diagnosis dysphagia   Functional Limitations Related to Problem List (SLP) unable to safely handle solid food   Risks & Benefits of  therapy have been explained evaluation/treatment results reviewed;care plan/treatment goals reviewed;participants voiced agreement with care plan;participants included;patient  (needs reinforcement due to cognition)   Clinical Impression Comments Currently appears to demonstrate primary oral dysphagia consistent with cognitive impairment. She demonstrates active mastication and manipulation of boluses but may hold bolus anteriorly and has difficulty transferring solids posteriorly to trigger swallow. After spitting out soft solid bolus she continually spit out micro pieces of texture which is again consistent with cognition. She demonstrated no significant difficulty with puree texture, no s/s aspiration with puree or thin liquids taken by straw in single sips or rapid consecutive swallows. Given current dehydration and risk for poor intake/nutrition, a diet of puree w/thin liquids is recommended to maximize nutrition/hydration.   SLP Total Evaluation Time   Eval: oral/pharyngeal swallow function, clinical swallow Minutes (32452) 23   SLP Goals   Therapy Frequency (SLP Eval) 4 times/wk   SLP Goals Swallow   SLP: Safely tolerate diet without signs/symptoms of aspiration Minced & moist diet;Thin liquids;With use of swallow precautions;With use of compensatory swallow strategies   SLP Discharge Planning   SLP Plan 4x/wk oral difficulty consistent cog imp.,, holding, spitting out pieces of texture. trial minced/moist as appropriate, f/u thin liq, if resp decline or s/s request VSS, see hx   SLP Discharge Recommendation Long term care facility   SLP Rationale for DC Rec anticipate return to SNF   SLP Brief overview of current status  puree w/thin liquids, assist to feed. slow rate of intake.   Total Session Time   Total Session Time (sum of timed and untimed services) 23

## 2023-01-04 NOTE — PHARMACY-ADMISSION MEDICATION HISTORY
Pharmacy Note - Admission Medication History    Pertinent Provider Information:      ______________________________________________________________________    Prior To Admission (PTA) med list completed and updated in EMR.       PTA Med List   Medication Sig Last Dose     acetaminophen (TYLENOL) 500 MG tablet Take 1,000 mg by mouth 3 times daily 1/3/2023     ASPIRIN LOW DOSE 81 MG chewable tablet CHEW AND SWALLOW 1 TAB BY MOUTH ONCE DAILY IN THE MORNING 1/3/2023     atorvastatin (LIPITOR) 80 MG tablet Take 1 tablet by mouth daily. Pt needs to have labs done prior to further refills. 1/3/2023     bisacodyl (DULCOLAX) 10 MG suppository Place 10 mg rectally daily as needed for constipation      CHLORTHALIDONE PO Take 25 mg by mouth every morning  1/3/2023     diclofenac (VOLTAREN) 1 % topical gel Apply 2 g topically 4 times daily 1/3/2023     dorzolamide-timolol (COSOPT) 2-0.5 % ophthalmic solution Place 1 drop into both eyes 2 times daily 1/3/2023     DULoxetine HCl (CYMBALTA PO) Take 90 mg by mouth every morning  1/3/2023     folic acid (FOLVITE) 1 MG tablet Take 1 mg by mouth every morning  1/3/2023     hydrocortisone, Perianal, (HYDROCORTISONE) 2.5 % cream Place rectally 2 times daily as needed for hemorrhoids Unknown     insulin glargine (SEMGLEE) 100 UNIT/ML vial Inject 45 Units Subcutaneous At Bedtime 1/2/2023     insulin lispro (HUMALOG KWIKPEN) 100 UNIT/ML (1 unit dial) KWIKPEN Inject Subcutaneous 3 times daily (before meals) 140-189=1 unit  190-239= 2 units  240-289= 3 units  290-339= 4 units  340-399= 5 units  400-999= 6 units 1/3/2023     insulin lispro (HUMALOG KWIKPEN) 100 UNIT/ML (1 unit dial) KWIKPEN Inject Subcutaneous At Bedtime 200-249=1 unit  250-299= 2 units  300-349= 3 units  350-399= 4 units  400-999= 5units Past Week     ketoconazole (NIZORAL) 2 % shampoo To entire wet scalp and ears and then wash off after 5 minutes three times a week. (Patient taking differently: To entire wet scalp and ears  and then wash off after 5 minutes Tuesday and Friday) Past Week     latanoprost (XALATAN) 0.005 % ophthalmic solution Place 1 drop into both eyes At Bedtime 1/2/2023     liraglutide (VICTOZA PEN) 18 MG/3ML solution Inject subcu 1.2mg daily. 1/3/2023     loperamide (IMODIUM A-D) 2 MG tablet Take 2 mg by mouth 4 times daily as needed for diarrhea Unknown     loratadine (CLARITIN) 10 MG tablet Take 10 mg by mouth as needed. Unknown     metFORMIN (GLUCOPHAGE-XR) 500 MG 24 hr tablet Take 2 tablets (1,000 mg) by mouth daily (with dinner) 1/2/2023     metoprolol tartrate (LOPRESSOR) 100 MG tablet Take 100 mg by mouth 2 times daily 1/3/2023     nitroGLYCERIN (NITROSTAT) 0.4 MG SL tablet Place 0.4 mg under the tongue every 5 minutes as needed. Unknown     olopatadine (PATADAY) 0.2 % ophthalmic solution Place 1 drop into both eyes daily For itchy eyes Unknown     omeprazole (PRILOSEC) 20 MG DR capsule Take 20 mg by mouth daily 1/3/2023     polyvinyl alcohol (LIQUIFILM TEARS) 1.4 % ophthalmic solution Place 1 drop into both eyes 3 times daily 1/3/2023     risperiDONE (RISPERDAL) 0.5 MG tablet Take 0.5 mg by mouth 2 times daily X 5 days for delirium 1/3/2023     senna-docusate (SENOKOT-S/PERICOLACE) 8.6-50 MG tablet Take 2 tablets by mouth 2 times daily as needed for constipation Unknown     simethicone (MYLICON) 125 MG chewable tablet Take 125 mg by mouth 4 times daily as needed After meals and HS prn Unknown     White Petrolatum-Mineral Oil (REFRESH P.M.) OINT Apply to eye nightly as needed Unknown       Information source(s): Facility (U/NH/) medication list/MAR  Method of interview communication: N/A    Summary of Changes to PTA Med List  New: insulin lispro  Discontinued: gabapentin, lyrica (both discontinued at previous hospital admission per Care Everywhere)  Changed: none    Patient was asked about OTC/herbal products specifically.  PTA med list reflects this.    In the past week, patient estimated taking medication  this percent of the time:  greater than 90%.    Allergies were reviewed, assessed, and updated with the patient.      Patient did not bring any medications to the hospital and can't retrieve from home. No multi-dose medications are available for use during hospital stay.     The information provided in this note is only as accurate as the sources available at the time of the update(s).    Thank you for the opportunity to participate in the care of this patient.    Sejal Encarnacion [facility]  1/3/2023 11:30 PM

## 2023-01-04 NOTE — PLAN OF CARE
Physical Therapy Discharge Summary    Reason for therapy discharge:    No further expectations of functional progress.    Progress towards therapy goal(s). See goals on Care Plan in [facility] electronic health record for goal details.  NA    Therapy recommendation(s):    No further therapy is recommended.

## 2023-01-04 NOTE — PROGRESS NOTES
Cristal Preciado is a 71 yo F with h/o CAD, mild cognitive impairment, DM2 with neuropathy/retinopathy/nephropathy, GERD, HTN, HLD, h/o stroke, MDD, and CKD2 who was recently diagnosed with UTI and COVID at [facility] 12/29/22 and presents with ongoing worsening encephalopathy of unclear etiology for the past 1-2 weeks.    Seen earlier by Dr. Vasquez  - SLP evaluation complete- patient started on pureed diet with thin liquids. Po medications restarted.  - B12 792. LYNN pending  - MRI-brain and neurology consult pending.

## 2023-01-05 PROBLEM — R41.0 CONFUSION: Status: ACTIVE | Noted: 2023-01-01

## 2023-01-05 PROBLEM — U07.1 INFECTION DUE TO 2019 NOVEL CORONAVIRUS: Status: RESOLVED | Noted: 2023-01-01 | Resolved: 2023-01-01

## 2023-01-05 PROBLEM — E86.0 DEHYDRATION: Status: ACTIVE | Noted: 2023-01-01

## 2023-01-05 NOTE — PROGRESS NOTES
Bedside EEG was performed that included photic stimulation. No HV due to patient state. The patient was both awake and asleep during the recording.  EEG #   Skins-Intactg  EEG system was used.[id number]

## 2023-01-05 NOTE — PLAN OF CARE
"  Problem: Plan of Care - These are the overarching goals to be used throughout the patient stay.    Goal: Plan of Care Review  Description: The Plan of Care Review/Shift note should be completed every shift.  The Outcome Evaluation is a brief statement about your assessment that the patient is improving, declining, or no change.  This information will be displayed automatically on your shift note.  Outcome: Progressing     Problem: Plan of Care - These are the overarching goals to be used throughout the patient stay.    Goal: Patient-Specific Goal (Individualized)  Description: You can add care plan individualizations to a care plan. Examples of Individualization might be:  \"Parent requests to be called daily at 9am for status\", \"I have a hard time hearing out of my right ear\", or \"Do not touch me to wake me up as it startles me\".  Outcome: Progressing   Goal Outcome Evaluation:    Patient denies any pain when asked and does not appear to be in any pain. Turned/micro shift every 2 hours. IV fluids infusing per MD order.                     "

## 2023-01-05 NOTE — UTILIZATION REVIEW
Admission Status; Secondary Review Determination       Under the authority of the Utilization Management Committee, the utilization review process indicated a secondary review on the above patient. The review outcome is based on review of the medical records, discussions with staff, and applying clinical experience noted on the date of the review.     (x) Inpatient Status Appropriate - This patient's medical care is consistent with medical management for inpatient care and reasonable inpatient medical practice.     RATIONALE FOR DETERMINATION     Ms. Preciado is a 73 yo female with PMH of mild cognitive dysfunction and recent admission for acute encephalopathy d/t UTI and COVID who returns to the hospital for increasing confusion, speech difficulties and visual hallucinations.  CT and MRI without acute findings.  Vitals are stable and no fevers.  Neuro consult requested today for ongoing neuro changes; EEG pending.    She is requiring ongoing inpatient treatment and monitoring yet today.    At the time of admission with the information available to the attending physician more than 2 nights Hospital complex care was anticipated, based on patient risk of adverse outcome if treated as outpatient and complex care required. Inpatient admission is appropriate based on the Medicare guidelines.    The information on this document is developed by the utilization review team in order for the business office to ensure compliance. This only denotes the appropriateness of proper admission status and does not reflect the quality of care rendered.   The definitions of Inpatient Status and Observation Status used in making the determination above are those provided in the CMS Coverage Manual, Chapter 1 and Chapter 6, section 70.4.         Sincerely,     Yolanda Justice, DO  Utilization Review  Physician Advisor  [facility].

## 2023-01-05 NOTE — CONSULTS
NEUROLOGY INPATIENT CONSULTATION NOTE       [facility] NEUROLOGY  [email or web address]     Cristal Preciado,  1950, MRN [id number]  PCP: Aditi Allen  Date: 2023     ASSESSMENT & PLAN     Diagnosis code: Encephalopathy    Ms. Preciado is admitted to the hospital with altered mental status.  Exact etiology is unclear to me.  Her perseverative vocal responses are echolalia were somewhat concerning that she could be having a seizure.  I have ordered an EEG to rule this out.  If the EEG does not show any epileptiform discharges I think supportive care and understanding what her cognitive baseline looks like will be important next steps.  MRI of the brain was reassuring to rule out stroke or other structural lesions.  It is noted on laboratory testing that she has a positive LYNN.  Consideration for some sort of limbic encephalitis could certainly be undertaken although it seems to be rather an acute presentation.  Lumbar puncture could be considered in the future.      Thank you again for this referral, please feel free to contact me if you have any questions.    75 minutes spent caring for patient including chart review, visit, and documentation.    Libertad Saha MD   of Neurology  [facility]  Pager: [phone]     CHIEF COMPLAINT Acute metabolic encephalopathy     HISTORY OF PRESENT ILLNESS     Neurology has been requested by Dr. Vasquez to evaluate Cristal Preciado who is a 72 year old woman for altered mental status.    Ms. Preciado is a 72-year-old woman whose past medical history is reported as being significant for mild cognitive impairment, type 2 diabetes, coronary artery disease, hypertension, hyperlipidemia, history of stroke, history of major depressive disorder and chronic kidney disease stage II.    The patient is unable to provide significant history today due to her level of confusion.    She was hospitalized most recently at [facility] for a  "COVID infection and UTI.  After discharge she was sent to a nursing home.  [facility] sent her to the emergency department when she became confused and started to have hallucinations.    About a month ago per the chart it sounds as though the patient was mostly oriented and would be able to have a reasonable conversation back-and-forth.     PROBLEM LIST      Patient Active Problem List   Diagnosis Code     Cataract H26.9     CAD (coronary artery disease) I25.10     Diabetic nephropathy (H) E11.21     Diabetic neuropathy (H) E11.40     Diabetic retinopathy (H) E11.319     GERD (gastroesophageal reflux disease) K21.9     Glaucoma H40.9     Hypertension I10     Hyperlipidemia E78.5     H/O: stroke Z86.73     Anemia D64.9     Seasonal allergies J30.2     Depression F32.A     Tubular adenoma of colon D12.6     Leg weakness R29.898     Dermatitis, seborrheic L21.9     Eczematous dermatitis L30.9     History of coronary artery disease Z86.79     Venous stasis dermatitis of both lower extremities I87.2     Uncontrolled type 2 diabetes mellitus E11.65     Chronic dermatitis of hands L30.9     Neoplasm of uncertain behavior of skin D48.5     S/P hysterectomy Z90.710     Hypomagnesemia E83.42     Gout M10.9     Diabetic peripheral neuropathy associated with type 2 diabetes mellitus (H) E11.42     CKD (chronic kidney disease) N18.9     Acute chest pain R07.9     Chronic kidney disease, stage 2 (mild) N18.2     Acute metabolic encephalopathy G93.41     Oropharyngeal dysphagia R13.12     Sacral decubitus ulcer L89.159     Dehydration E86.0     Confusion R41.0      Clinically Significant Risk Factors Present on Admission            # DMII: A1C = 7.6 % (Ref range: <5.7 %) within past 3 months    # Overweight: Estimated body mass index is 26.98 kg/m  as calculated from the following:    Height as of 12/20/22: 1.6 m (5' 3\").    Weight as of 12/20/22: 69.1 kg (152 lb 4.8 oz).              PAST MEDICAL & SURGICAL HISTORY "     Past Medical History: Patient  has a past medical history of AION (anterior ischaemic optic neuropathy), left eye, CAD (coronary artery disease) (3/2009), Cataract, CVA (cerebral vascular accident) (H), Depression, Diabetes mellitus, type 2 (H), Diabetic nephropathy (H), Diabetic neuropathy (H), Diabetic retinopathy (H), GERD (gastroesophageal reflux disease), Hyperlipidemia, Hypertension, Infection due to  novel coronavirus (2023), POAG (primary open-angle glaucoma), Seasonal allergies, and Tubular adenoma of colon ().    Past Surgical History: She  has a past surgical history that includes Extracapsular cataract extration with intraocular lens implant (11-10-09, 2-9-10); Colonoscopy (7/15/2013);  section; stent, coronary, lainey (); DaVINCI hysterectomy total, bilateral salpingo-oophorectomy, combined (N/A, 2019); Colonoscopy (N/A, 2020); Coronary Stent Placement (2004); Cardiac catheterization; Hysterectomy total abdominal, bilateral salpingo-oophorectomy, combined (2019); Cataract Extraction W/ Intraocular Lens Implant (Bilateral); and  Section.     SOCIAL HISTORY     Reviewed, and she  reports that she quit smoking about 9 years ago. Her smoking use included cigarettes. She started smoking about 55 years ago. She has a 67.50 pack-year smoking history. She has never used smokeless tobacco. She reports that she does not currently use alcohol. She reports that she does not use drugs.     FAMILY HISTORY     Reviewed, and family history includes Arthritis in her brother; Cancer in her father; Cancer - colorectal in an other family member; Cerebrovascular Disease in her mother and another family member; Diabetes in her sister and sister; Glaucoma in her father, sister, and sister; Hypertension in her mother.     ALLERGIES     Allergies   Allergen Reactions     Dust Mites Other (See Comments)     Sneezing runny eyes and nose.     Food Allergy Formula Hives      Mountain Dew and Walnuts     Pollen Extract Other (See Comments)     Sneezing runny eyes and nose.        REVIEW OF SYSTEMS     Review of systems not obtained due to patient factors.     HOME & HOSPITAL MEDICATIONS     Prior to Admission Medications  Medications Prior to Admission   Medication Sig Dispense Refill Last Dose     acetaminophen (TYLENOL) 500 MG tablet Take 1,000 mg by mouth 3 times daily   1/3/2023     ASPIRIN LOW DOSE 81 MG chewable tablet CHEW AND SWALLOW 1 TAB BY MOUTH ONCE DAILY IN THE MORNING  99 1/3/2023     atorvastatin (LIPITOR) 80 MG tablet Take 1 tablet by mouth daily. Pt needs to have labs done prior to further refills. 90 tablet 0 1/3/2023     bisacodyl (DULCOLAX) 10 MG suppository Place 10 mg rectally daily as needed for constipation        CHLORTHALIDONE PO Take 25 mg by mouth every morning    1/3/2023     diclofenac (VOLTAREN) 1 % topical gel Apply 2 g topically 4 times daily   1/3/2023     dorzolamide-timolol (COSOPT) 2-0.5 % ophthalmic solution Place 1 drop into both eyes 2 times daily 10 mL 3 1/3/2023     DULoxetine HCl (CYMBALTA PO) Take 90 mg by mouth every morning    1/3/2023     folic acid (FOLVITE) 1 MG tablet Take 1 mg by mouth every morning    1/3/2023     hydrocortisone, Perianal, (HYDROCORTISONE) 2.5 % cream Place rectally 2 times daily as needed for hemorrhoids 60 g 0 Unknown     insulin glargine (SEMGLEE) 100 UNIT/ML vial Inject 45 Units Subcutaneous At Bedtime   1/2/2023     insulin lispro (HUMALOG KWIKPEN) 100 UNIT/ML (1 unit dial) KWIKPEN Inject Subcutaneous 3 times daily (before meals) 140-189=1 unit  190-239= 2 units  240-289= 3 units  290-339= 4 units  340-399= 5 units  400-999= 6 units   1/3/2023     insulin lispro (HUMALOG KWIKPEN) 100 UNIT/ML (1 unit dial) KWIKPEN Inject Subcutaneous At Bedtime 200-249=1 unit  250-299= 2 units  300-349= 3 units  350-399= 4 units  400-999= 5units   Past Week     ketoconazole (NIZORAL) 2 % shampoo To entire wet scalp and ears and then  wash off after 5 minutes three times a week. (Patient taking differently: To entire wet scalp and ears and then wash off after 5 minutes Tuesday and Friday) 240 mL 11 Past Week     latanoprost (XALATAN) 0.005 % ophthalmic solution Place 1 drop into both eyes At Bedtime 5 mL 2 1/2/2023     liraglutide (VICTOZA PEN) 18 MG/3ML solution Inject subcu 1.2mg daily. 9 mL 1 1/3/2023     loperamide (IMODIUM A-D) 2 MG tablet Take 2 mg by mouth 4 times daily as needed for diarrhea   Unknown     loratadine (CLARITIN) 10 MG tablet Take 10 mg by mouth as needed.   Unknown     metFORMIN (GLUCOPHAGE-XR) 500 MG 24 hr tablet Take 2 tablets (1,000 mg) by mouth daily (with dinner) 180 tablet 3 1/2/2023     metoprolol tartrate (LOPRESSOR) 100 MG tablet Take 100 mg by mouth 2 times daily   1/3/2023     nitroGLYCERIN (NITROSTAT) 0.4 MG SL tablet Place 0.4 mg under the tongue every 5 minutes as needed.   Unknown     olopatadine (PATADAY) 0.2 % ophthalmic solution Place 1 drop into both eyes daily For itchy eyes 2.5 mL 3 Unknown     omeprazole (PRILOSEC) 20 MG DR capsule Take 20 mg by mouth daily   1/3/2023     polyvinyl alcohol (LIQUIFILM TEARS) 1.4 % ophthalmic solution Place 1 drop into both eyes 3 times daily   1/3/2023     risperiDONE (RISPERDAL) 0.5 MG tablet Take 0.5 mg by mouth 2 times daily X 5 days for delirium   1/3/2023     senna-docusate (SENOKOT-S/PERICOLACE) 8.6-50 MG tablet Take 2 tablets by mouth 2 times daily as needed for constipation 60 tablet 0 Unknown     simethicone (MYLICON) 125 MG chewable tablet Take 125 mg by mouth 4 times daily as needed After meals and HS prn   Unknown     White Petrolatum-Mineral Oil (REFRESH P.M.) OINT Apply to eye nightly as needed   Unknown       Hospital Medications    aspirin  81 mg Oral Daily     atorvastatin  80 mg Oral Daily     chlorthalidone  25 mg Oral QAM     dorzolamide-timolol  1 drop Both Eyes BID     DULoxetine  90 mg Oral QAM     enoxaparin ANTICOAGULANT  40 mg Subcutaneous Q24H      insulin aspart  1-12 Units Subcutaneous Q4H     insulin glargine  20 Units Subcutaneous QAM AC     latanoprost  1 drop Both Eyes At Bedtime     liraglutide  1.2 mg Subcutaneous Daily     metoprolol tartrate  100 mg Oral BID     olopatadine  1 drop Both Eyes BID     pantoprazole  40 mg Oral QAM AC     polyvinyl alcohol  1 drop Both Eyes TID     risperiDONE  0.5 mg Oral BID     sodium chloride (PF)  3 mL Intracatheter Q8H        PHYSICAL EXAM     Vital signs  Temp:  [98.1  F (36.7  C)-98.2  F (36.8  C)] 98.2  F (36.8  C)  Pulse:  [55-86] 76  Resp:  [16-18] 18  BP: (116-185)/() 116/59  SpO2:  [97 %-100 %] 100 %    Weight:   Wt Readings from Last 1 Encounters:   12/20/22 69.1 kg (152 lb 4.8 oz)        General Physical Exam:   The patient is laying in bed and is awake.  She is in no acute distress  Neurological Exam:  Mental status: The patient is awake.  She is very perseverative.  When asked a question about where she lives she started saying the words 21 22 and then kept repeating those words throughout the entirety of the examination.  Every question asked subsequently would essentially produce that answer or yes no and then she would go back to saying those words.  Speech: Her speech was clear despite being perseverative.  I did not appreciate a dysarthria.  Could not really assess for an aphasia.  Cranial nerves: The patient's face was symmetric.  Her pupils appeared to be postoperative and slightly irregular but reactive to light.  She would not cooperate with extraocular movements but she would follow things to track.  Motor: The patient had some stiff posturing of the upper extremities bilaterally.  Lower extremities she would wiggle her toes to command  Reflexes: Equivocal plantar on the left and extensor on the right  Sensory: Unable to perform  Coordination: Unable to perform  Gait: Unable to perform     DIAGNOSTIC STUDIES     Pertinent Radiology   Radiology Results: Reviewed impression and images      CT head  No acute hemorrhage    MRI brain  Generalized atrophy, small vessel ischemic disease no evidence of any acute stroke    Pertinent Labs   Lab Results: Personally Reviewed   Recent Results (from the past 24 hour(s))   Glucose by meter    Collection Time: 01/04/23  8:16 PM   Result Value Ref Range    GLUCOSE BY METER POCT 95 70 - 99 mg/dL   Glucose by meter    Collection Time: 01/05/23 12:50 AM   Result Value Ref Range    GLUCOSE BY METER POCT 144 (H) 70 - 99 mg/dL   Glucose by meter    Collection Time: 01/05/23  6:22 AM   Result Value Ref Range    GLUCOSE BY METER POCT 148 (H) 70 - 99 mg/dL   Glucose by meter    Collection Time: 01/05/23  8:07 AM   Result Value Ref Range    GLUCOSE BY METER POCT 169 (H) 70 - 99 mg/dL   Basic metabolic panel    Collection Time: 01/05/23  9:00 AM   Result Value Ref Range    Sodium 138 136 - 145 mmol/L    Potassium 3.4 3.4 - 5.3 mmol/L    Chloride 105 98 - 107 mmol/L    Carbon Dioxide (CO2) 22 22 - 29 mmol/L    Anion Gap 11 7 - 15 mmol/L    Urea Nitrogen 13.4 8.0 - 23.0 mg/dL    Creatinine 0.60 0.51 - 0.95 mg/dL    Calcium 9.2 8.8 - 10.2 mg/dL    Glucose 146 (H) 70 - 99 mg/dL    GFR Estimate >90 >60 mL/min/1.73m2   Iron and iron binding capacity    Collection Time: 01/05/23  9:00 AM   Result Value Ref Range    Iron 39 37 - 145 ug/dL    Iron Binding Capacity 167 (L) 240 - 430 ug/dL    Iron Sat Index 23 15 - 46 %   Extra Purple Top Tube    Collection Time: 01/05/23  9:00 AM   Result Value Ref Range    Hold Specimen JIC    Glucose by meter    Collection Time: 01/05/23 12:25 PM   Result Value Ref Range    GLUCOSE BY METER POCT 133 (H) 70 - 99 mg/dL   Glucose by meter    Collection Time: 01/05/23  4:58 PM   Result Value Ref Range    GLUCOSE BY METER POCT 103 (H) 70 - 99 mg/dL           This note was dictated using voice recognition software.  Any grammatical or context distortions are unintentional and inherent to the software.

## 2023-01-05 NOTE — PROGRESS NOTES
[facility]    Medicine Progress Note - Hospitalist Service    Date of Admission:  1/3/2023    Assessment & Plan      Cristal Preciado is a 71 yo F with h/o CAD, mild cognitive impairment, DM2 with neuropathy/retinopathy/nephropathy, GERD, HTN, HLD, h/o stroke, MDD, and CKD2 who was recently diagnosed with UTI and COVID at [facility] 12/29/22 and presents with ongoing worsening encephalopathy of unclear etiology for the past 1-2 weeks.  Will check head MRI and have neurology evaluate since this seems worse than what was described when she was at [facility] last week.  This could all still be due to multiple stressors affecting a somewhat frail brain (with previous strokes she does have mild cognitive impairment reportedly which is being exacerbated by stress of COVD infection).      Acute metabolic encephalopathy   - unclear etiology.    - No reported history currently of focal findings to suggest new stroke.    - This may be a combination of things including stress from moving into a new nursing home 1 month ago along with the holidays and getting COVID sometime in the last week or so along with recent UTI that was treated.  Per [facility] she does have some underlying mild cognitive impairment that all these stressors are exacerbating.    - was admitted to [facility] 12/29/22 with encephalopathy  - 01/05 MRI-brain: no acute issues. B12 792.  - f/u EEG per neurology consult  - SLP joan appreciated    Infection due to 2019 novel coronavirus    - diagnosed 12/25/2022 at [facility] - no meds, asymptomatic     CAD (coronary artery disease)  - c/w PTA lipitor, metoprolol, and ASA    Diabetic neuropathy/nephropathy/retinopathy  - C/w PTA cymbalta    GERD (gastroesophageal reflux disease)   - pantoprazole    Hypertension   - c/w PTA chlorthalidone, metoprolol    Hyperlipidemia  - c/w PTA Lipitor    H/o stroke   - c/w PTA aspirin and Lipitor    Depression   - c/w PTA Cymbalta    Uncontrolled  "type 2 diabetes mellitus   - c/w PTA victoza  -accu-checks, lantus 20 units every day, insulin sliding scale    Chronic kidney disease, stage 2 (mild)   - follow with gentle hydration    Sacral decubitus ulcer  - seen at [facility] on 12/29/22   - WO consult appreciated.    Glaucoma   - c/w PTA eye drops    Mild cognitive impairment   - c/w PTA risperidone       Diet: Combination Diet Pureed Diet (level 4); Thin Liquids (level 0)    DVT Prophylaxis: Enoxaparin (Lovenox) SQ  Cornejo Catheter: Not present  Lines: None     Cardiac Monitoring: None  Code Status: Full Code      Clinically Significant Risk Factors Present on Admission           # Hypercalcemia: corrected calcium is >10.1, will monitor as appropriate    # Hypoalbuminemia: Lowest albumin = 3.3 g/dL at 1/3/2023 10:50 PM, will monitor as appropriate         # DMII: A1C = 7.6 % (Ref range: <5.7 %) within past 3 months    # Overweight: Estimated body mass index is 26.98 kg/m  as calculated from the following:    Height as of 12/20/22: 1.6 m (5' 3\").    Weight as of 12/20/22: 69.1 kg (152 lb 4.8 oz).           Disposition Plan     Expected Discharge Date: 01/05/2023                  Anjelica Sharif MD  Hospitalist Service  [facility]  Securely message with [employer] (more info)  Text page via [facility] Paging/Directory   ______________________________________________________________________    Interval History   Patient seen and examined at bedside. Patient is not oriented to place and time. She denied any chest pain, shortness of breath, fever, chills, headache, weakness.    Physical Exam   Vital Signs: Temp: 98.2  F (36.8  C) Temp src: Oral BP: 116/59 Pulse: 76   Resp: 18 SpO2: 100 % O2 Device: None (Room air)    Weight: 0 lbs 0 oz    GEN: Alert and oriented only to self. Not in acute distress.  HEENT: Atraumatic, mucous membrane- moist and pink.  Chest: Bilateral air entry.  CVS: S1S2 regular. No murmurs, rubs or gallops.  Abdomen: Soft. " Non-tender, non-distended. No organomegaly. No guarding or rigidity. Bowel sounds active.   Extremities: No pedal edema.  CNS: No focal neurologic deficit. No involuntary movements.  Skin: No skin rash, no cyanosis or clubbing.     Medical Decision Making             Data     I have personally reviewed the following data over the past 24 hrs:    N/A  \   N/A   / N/A     138 105 13.4 /  133 (H)   3.4 22 0.60 \

## 2023-01-05 NOTE — PROGRESS NOTES
[facility] ED Handoff Report    ED Chief Complaint: Pt brought in via EMS for hallucination, confusion, stuttering words, unable to visualize family when approached when family visited pt at 6pm. Pt had covid 2 weeks ago. Hx of a stroke 10 years ago with similar symptoms.       Triage Assessment       Row Name 01/03/23 1936       Triage Assessment (Adult)    Airway WDL WDL       Respiratory WDL    Respiratory WDL WDL                    ED Diagnosis:  (R41.0) Confusion  Comment: Alert to self  Plan: Neurology just saw, they are going to order an EEG. MRI was negative.     (E86.0) Dehydration  Comment: IV fluids  Plan: NS at 50ml/hr       PMH:    Past Medical History:   Diagnosis Date     AION (anterior ischaemic optic neuropathy), left eye     NAION LE     CAD (coronary artery disease) 3/2009    [facility]; Angio 2013 [facility]- normal coronary arteries     Cataract      CVA (cerebral vascular accident) (H)     admitted at [facility]     on Cymbalta     Diabetes mellitus, type 2 (H)      Diabetic nephropathy (H)      Diabetic neuropathy (H)     severe     Diabetic retinopathy (H)      GERD (gastroesophageal reflux disease)      Hyperlipidemia      Hypertension     ECHO 2013, TDS, NL EF     Infection due to 2019 novel coronavirus 1/4/2023     POAG (primary open-angle glaucoma)     adv BE     Seasonal allergies      Tubular adenoma of colon 2013    repeat colonoscopy in 2018        Code Status:  Full Code     Falls Risk: Yes Band: Applied    Current Living Situation/Residence: lives in a skilled nursing facility     Elimination Status: Continent: No purewick    Activity Level: 2 assist    Patients Preferred Language:  English     Needed: No    Vital Signs:  BP (!) 142/66   Pulse 75   Temp 98.2  F (36.8  C) (Oral)   Resp 16   SpO2 100%      Cardiac Rhythm: na    Pain Score: 0/10    Is the Patient Confused:  Yes    Last Food or Drink: 01/05/23 at 1300    Focused Assessment:  Patient  alert to self, came in with increased confusion, worsening encephalopathy. Patient recently at [facility], had covid 12/22-now recovered with no symptoms. Able to answer questions at times, otherwise more yes/no questions. Crush pills in applesauce. Blood sugar checks with sliding scale.     Tests Performed: Done: Labs and Imaging  Labs Ordered and Resulted from Time of ED Arrival to Time of ED Departure   BASIC METABOLIC PANEL - Abnormal       Result Value    Sodium 140      Potassium 3.9      Chloride 105      Carbon Dioxide (CO2) 20 (*)     Anion Gap 15      Urea Nitrogen 29.3 (*)     Creatinine 0.94      Calcium 9.4      Glucose 218 (*)     GFR Estimate 65     HEPATIC FUNCTION PANEL - Abnormal    Protein Total 7.1      Albumin 3.3 (*)     Bilirubin Total 0.3      Alkaline Phosphatase 194 (*)     AST 76 (*)     ALT 60 (*)     Bilirubin Direct <0.20     ROUTINE UA WITH MICROSCOPIC REFLEX TO CULTURE - Abnormal    Color Urine Yellow      Appearance Urine Clear      Glucose Urine 70 (*)     Bilirubin Urine Negative      Ketones Urine Negative      Specific Gravity Urine 1.029      Blood Urine Negative      pH Urine 5.0      Protein Albumin Urine 20 (*)     Urobilinogen Urine 2.0 (*)     Nitrite Urine Negative      Leukocyte Esterase Urine Negative      Bacteria Urine Few (*)     RBC Urine 2      WBC Urine 2      Squamous Epithelials Urine <1      Hyaline Casts Urine 68 (*)    CRP INFLAMMATION - Abnormal    CRP Inflammation 9.20 (*)    PROCALCITONIN - Abnormal    Procalcitonin 0.10 (*)    GLUCOSE BY METER - Abnormal    GLUCOSE BY METER POCT 211 (*)    CBC WITH PLATELETS AND DIFFERENTIAL - Abnormal    WBC Count 6.7      RBC Count 3.21 (*)     Hemoglobin 10.1 (*)     Hematocrit 31.9 (*)     MCV 99      MCH 31.5      MCHC 31.7      RDW 12.7      Platelet Count 230      % Neutrophils 57      % Lymphocytes 26      % Monocytes 15      % Eosinophils 2      % Basophils 0      % Immature Granulocytes 0      NRBCs per 100  WBC 0      Absolute Neutrophils 3.8      Absolute Lymphocytes 1.8      Absolute Monocytes 1.0      Absolute Eosinophils 0.1      Absolute Basophils 0.0      Absolute Immature Granulocytes 0.0      Absolute NRBCs 0.0     HEMOGLOBIN A1C - Abnormal    Hemoglobin A1C 7.6 (*)    COMPREHENSIVE METABOLIC PANEL - Abnormal    Sodium 142      Potassium 3.6      Chloride 106      Carbon Dioxide (CO2) 24      Anion Gap 12      Urea Nitrogen 25.6 (*)     Creatinine 0.84      Calcium 9.6      Glucose 187 (*)     Alkaline Phosphatase 186 (*)     AST 69 (*)     ALT 55 (*)     Protein Total 7.1      Albumin 3.4 (*)     Bilirubin Total 0.3      GFR Estimate 74     CBC WITH PLATELETS - Abnormal    WBC Count 6.2      RBC Count 3.19 (*)     Hemoglobin 9.9 (*)     Hematocrit 31.5 (*)     MCV 99      MCH 31.0      MCHC 31.4 (*)     RDW 12.6      Platelet Count 214     FOLATE - Abnormal    Folic Acid >40.0 (*)    ANTI NUCLEAR KULDIP IGG BY IFA WITH REFLEX - Abnormal    LYNN interpretation Positive (*)     LYNN pattern 1 Speckled      LYNN titer 1 1:320     GLUCOSE BY METER - Abnormal    GLUCOSE BY METER POCT 167 (*)    GLUCOSE BY METER - Abnormal    GLUCOSE BY METER POCT 161 (*)    GLUCOSE BY METER - Abnormal    GLUCOSE BY METER POCT 130 (*)    BASIC METABOLIC PANEL - Abnormal    Sodium 138      Potassium 3.4      Chloride 105      Carbon Dioxide (CO2) 22      Anion Gap 11      Urea Nitrogen 13.4      Creatinine 0.60      Calcium 9.2      Glucose 146 (*)     GFR Estimate >90     IRON AND IRON BINDING CAPACITY - Abnormal    Iron 39      Iron Binding Capacity 167 (*)     Iron Sat Index 23     GLUCOSE BY METER - Abnormal    GLUCOSE BY METER POCT 144 (*)    GLUCOSE BY METER - Abnormal    GLUCOSE BY METER POCT 148 (*)    GLUCOSE BY METER - Abnormal    GLUCOSE BY METER POCT 168 (*)    GLUCOSE BY METER - Abnormal    GLUCOSE BY METER POCT 169 (*)    GLUCOSE BY METER - Abnormal    GLUCOSE BY METER POCT 133 (*)    MAGNESIUM - Normal    Magnesium 1.9      TSH WITH FREE T4 REFLEX - Normal    TSH 1.15     VITAMIN B12 - Normal    Vitamin B12 792     GLUCOSE BY METER - Normal    GLUCOSE BY METER POCT 95     GLUCOSE MONITOR NURSING POCT   GLUCOSE MONITOR NURSING POCT     MR Brain w/o Contrast   Final Result   IMPRESSION:   1.  No recent infarct, intracranial mass or evidence of intracranial hemorrhage.   2.  Mild to moderate volume loss and presumed chronic small vessel ischemic changes.      CT Chest Abdomen Pelvis w/o Contrast   Final Result   IMPRESSION:   1.  No acute abnormality identified.   2.  A few indeterminate small pulmonary nodules, see below for follow-up.   3.  Diffuse coronary artery calcifications.   4.  Small cholelithiasis.      Recommendations for one or multiple incidental lung nodules < 6mm:   Low-Risk Patient: No routine follow-up.   High-Risk Patient: Optional follow-up CT at 12 months; if unchanged, no further follow-up.      *Low Risk: Minimal or absent history of smoking or other known risk factors.   *Nonsolid (ground-glass) or partly solid nodules may require longer follow-up to exclude indolent adenocarcinoma.   1.  *Recommendations based on Guidelines for the Management of Incidental Pulmonary Nodules Detected at CT: From the Fleischner Society 2017, Radiology 2017.      CT Head w/o Contrast   Final Result   IMPRESSION:   1.  No CT evidence for acute intracranial process.   2.  Mild presumed chronic microvascular ischemic changes as above.        Treatments Provided:      Family Dynamics/Concerns: No    Family Updated On Visitor Policy: No    Plan of Care Communicated to Family: No    Who Was Updated about Plan of Care: family    Belongings Checklist Done and Signed by Patient: Yes    Medications sent with patient: yes    Covid: asymptomatic , positive recovered    Additional Information: no    RN: Yolanda Zamudio RN 1/5/2023 2:01 PM

## 2023-01-06 NOTE — PROGRESS NOTES
[facility]    Medicine Progress Note - Hospitalist Service    Date of Admission:  1/3/2023    Assessment & Plan      Cristal Preciado is a 71 yo F with h/o CAD, mild cognitive impairment, DM2 with neuropathy/retinopathy/nephropathy, GERD, HTN, HLD, h/o stroke, MDD, and CKD2 who was recently diagnosed with UTI and COVID at [facility] 12/29/22 and presents with ongoing worsening encephalopathy of unclear etiology for the past 1-2 weeks.  Will check head MRI and have neurology evaluate since this seems worse than what was described when she was at [facility] last week.  This could all still be due to multiple stressors affecting a somewhat frail brain (with previous strokes she does have mild cognitive impairment reportedly which is being exacerbated by stress of COVD infection).      Acute metabolic encephalopathy   - unclear etiology.    - No reported history currently of focal findings to suggest new stroke.    - This may be a combination of things including stress from moving into a new nursing home 1 month ago along with the holidays and getting COVID sometime in the last week or so along with recent UTI that was treated.  Per [facility] she does have some underlying mild cognitive impairment that all these stressors are exacerbating.    - was admitted to [facility] 12/29/22 with encephalopathy  - 01/05 MRI-brain: no acute issues. B12 792. LYNN positive  - f/u EEG per neurology consult  - SLP joan brar  - 01/06: EEG: abnormal EEG due to diffusely slow background in theta and delta range that suggests a nonspecific generalized cerebral dysfunction. Such slowing can be seen in metabolic or toxic abnormalities, clinical correlation is recommended. No sharp discharges or spikes were recorded during this recording to suggest a seizure disorder.  - Awaiting final neurology recommendations      Infection due to 2019 novel coronavirus    - diagnosed 12/25/2022 at [facility] - no meds,  "asymptomatic     CAD (coronary artery disease)  - c/w PTA lipitor, metoprolol, and ASA    Diabetic neuropathy/nephropathy/retinopathy  - C/w PTA cymbalta    GERD (gastroesophageal reflux disease)   - pantoprazole    Hypertension   - c/w PTA chlorthalidone, metoprolol    Hyperlipidemia  - c/w PTA Lipitor    H/o stroke   - c/w PTA aspirin and Lipitor    Depression   - c/w PTA Cymbalta    Uncontrolled type 2 diabetes mellitus   - c/w PTA victoza  -accu-checks, lantus 20 units every day, insulin sliding scale    Chronic kidney disease, stage 2 (mild)   - follow with gentle hydration    Sacral decubitus ulcer  - seen at [facility] on 12/29/22   - WOC consult appreciated.    Glaucoma   - c/w PTA eye drops    Mild cognitive impairment   - c/w PTA risperidone       Diet: Combination Diet Pureed Diet (level 4); Thin Liquids (level 0)    DVT Prophylaxis: Enoxaparin (Lovenox) SQ  Cornejo Catheter: Not present  Lines: None     Cardiac Monitoring: None  Code Status: Full Code      Clinically Significant Risk Factors              # Hypoalbuminemia: Lowest albumin = 3.3 g/dL at 1/3/2023 10:50 PM, will monitor as appropriate           # DMII: A1C = 7.6 % (Ref range: <5.7 %) within past 3 months, PRESENT ON ADMISSION  # Overweight: Estimated body mass index is 26.98 kg/m  as calculated from the following:    Height as of 12/20/22: 1.6 m (5' 3\").    Weight as of 12/20/22: 69.1 kg (152 lb 4.8 oz)., PRESENT ON ADMISSION         Disposition Plan      Expected Discharge Date: 01/07/2023      Destination: long-term care facility            Anjelica Sharif MD  Hospitalist Service  [facility]  Securely message with [employer] (more info)  Text page via [facility] Paging/Directory   ______________________________________________________________________    Interval History   Patient seen and examined at bedside. Patient is oriented to self and place. She denied any chest pain, shortness of breath, fever, chills, headache, " weakness. Tried to contact NOK (Ms. Court Preciado) twice today, No answer. Not able to leave a message due to full voicemail.    Physical Exam   Vital Signs: Temp: 97.7  F (36.5  C) Temp src: Oral BP: 133/64 Pulse: 77   Resp: 16 SpO2: 100 % O2 Device: None (Room air)    Weight: 0 lbs 0 oz    GEN: Alert and oriented to self and place. Not in acute distress.  HEENT: Atraumatic, mucous membrane- moist and pink.  Chest: Bilateral air entry.  CVS: S1S2 regular. No murmurs, rubs or gallops.  Abdomen: Soft. Non-tender, non-distended. No organomegaly. No guarding or rigidity. Bowel sounds active.   Extremities: No pedal edema.  CNS: No focal neurologic deficit. No involuntary movements.  Skin: No skin rash, no cyanosis or clubbing.     Medical Decision Making             Data     I have personally reviewed the following data over the past 24 hrs:    5.2  \   10.1 (L)   / 211     138 105 13.4 /  141 (H)   4.8 18 (L) 0.58 \       Ferritin:  N/A % Retic:  2.3 LDH:  188

## 2023-01-06 NOTE — PLAN OF CARE
PRIMARY DIAGNOSIS: ACUTE PAIN  OUTPATIENT/OBSERVATION GOALS TO BE MET BEFORE DISCHARGE:  1. Pain Status: Pain free.    2. Return to near baseline physical activity: Yes    3. Cleared for discharge by consultants (if involved): Yes    Discharge Planner Nurse   Safe discharge environment identified: No  Barriers to discharge: Yes       Entered by: Rex Azul RN 01/06/2023 10:57 AM   No verbal or nonverbal expressions of pain, eschar bilateral heals, coccyx wound. Hydrocolloid ordered.    Please review provider order for any additional goals.   Nurse to notify provider when observation goals have been met and patient is ready for discharge.Goal Outcome Evaluation:

## 2023-01-06 NOTE — PROCEDURES
RN-Vascular Access    S: US-guided Lab draw   B: Failed lab draw attempts  A: AC-basilic vein - lab draw using 20G PIV cath.  20 ml blood aspirated for labs  Full hemostasis achieved post-line pull.  R:  Assessed RUE and LUE for potential ML/PICC for vascular access/labs- no veins adequate in size identified by US.    Cheko Ballard, MSN, RN, VA-BC  Vascular Access - [facility]

## 2023-01-06 NOTE — PLAN OF CARE
"  Problem: Plan of Care - These are the overarching goals to be used throughout the patient stay.    Goal: Plan of Care Review  Description: The Plan of Care Review/Shift note should be completed every shift.  The Outcome Evaluation is a brief statement about your assessment that the patient is improving, declining, or no change.  This information will be displayed automatically on your shift note.  Outcome: Progressing  Goal: Patient-Specific Goal (Individualized)  Description: You can add care plan individualizations to a care plan. Examples of Individualization might be:  \"Parent requests to be called daily at 9am for status\", \"I have a hard time hearing out of my right ear\", or \"Do not touch me to wake me up as it startles me\".  Outcome: Progressing  Goal: Absence of Hospital-Acquired Illness or Injury  Outcome: Progressing  Intervention: Identify and Manage Fall Risk  Recent Flowsheet Documentation  Taken 1/5/2023 1542 by Opal Perez RN  Safety Promotion/Fall Prevention: activity supervised  Intervention: Prevent Skin Injury  Recent Flowsheet Documentation  Taken 1/5/2023 1542 by Opal Perez RN  Body Position:    right    turned  Goal: Optimal Comfort and Wellbeing  Outcome: Progressing  Goal: Readiness for Transition of Care  Outcome: Progressing  Intervention: Mutually Develop Transition Plan  Recent Flowsheet Documentation  Taken 1/5/2023 1600 by Opal Perez RN  Patient/Family Anticipates Transition to: long-term care facility  Taken 1/5/2023 1542 by Opal Perez RN  Patient/Family Anticipates Transition to: long-term care facility  Equipment Currently Used at Home:    lift device    wheelchair, manual    grab bar, tub/shower    grab bar, toilet   Goal Outcome Evaluation: Patient alert, but confused. Patient is oriented to self and place. C/o butt pain, but reports pain relief with reposition. Transfers from ED to P415. Blood sugar 103 and 123. 0.9% sodium chloride infusing 50ml/hr. On " room air. Wound/pressure injury at sacrum and both heel. Mepilex applied to sacrum/coccyx. Purewick in place. Patient incontinence of bladder and bowel.

## 2023-01-06 NOTE — PROGRESS NOTES
[facility]  WOC Nurse Inpatient Assessment     Consulted for: B heels, Buttocks    Patient History (according to provider note(s):        Areas Assessed:      Pressure Injury Location:     Last photo: 1/6  Wound type: Pressure Injury     Pressure Injury Stage: Unstageable, present on admission   Wound history/plan of care:   Present at previous facility  Wound base: 40 % granulation tissue, 60 % slough     Palpation of the wound bed: normal      Drainage: small     Description of drainage: serous     Measurements (length x width x depth, in cm) 4.2  x 3  x  0.3 cm      Tunneling N/A     Undermining N/A  Periwound skin: Scar tissue      Color: normal and consistent with surrounding tissue      Temperature: normal   Odor: none  Pain: absent  Treatment goal: Heal   STATUS: initial assessment  Supplies ordered: ordered hydrocolloid      B heels assessed and patient noted to have eschars - dry and intact. R heel measuring 4 cm x 4 cm and L heel measuring 7 cm x 7 cm.  Sable from previous assessment.    Treatment Plan:     Sacrum:  1. Cleanse with saline, including lloyd wound skin, gently pat dry  2. Apply skin prep to lloyd wound skin, cover with Tegaderm hydrocolloid  Change every other day  If supplies needed from stores, PS# [id number]    B heels - float at all times while in bed or recliner. If patient not floating heels well, may order Prevelon boots from stores for patient to use.    Orders: Reviewed and Written    RECOMMEND PRIMARY TEAM ORDER: None, at this time  Education provided: plan of care  Discussed plan of care with: Patient  WOC nurse follow-up plan: weekly   Notify WOC if wound(s) deteriorate.  Nursing to notify the Provider(s) and re-consult the WOC Nurse if new skin concern.    DATA:     Current support surface: Standard  Low air loss (AMADO pump, Isolibrium, Pulsate, skin guard, etc)  Containment of urine/stool: Incontinence Protocol  BMI: There is no height or weight on file to  calculate BMI.   Active diet order: Orders Placed This Encounter      Combination Diet Pureed Diet (level 4); Thin Liquids (level 0)     Output: I/O last 3 completed shifts:  In: 2785 [P.O.:150; I.V.:2635]  Out: 1975 [Urine:1975]     Labs:   Recent Labs   Lab 01/06/23  1215 01/04/23  0324 01/03/23  2250   ALBUMIN  --  3.4* 3.3*   HGB 10.1* 9.9* 10.1*   WBC 5.2 6.2 6.7   A1C  --   --  7.6*   CRP  --   --  9.20*     Pressure injury risk assessment:   Sensory Perception: 2-->very limited  Moisture: 3-->occasionally moist  Activity: 1-->bedfast  Mobility: 1-->completely immobile  Nutrition: 2-->probably inadequate  Friction and Shear: 2-->potential problem  Lalit Score: 11    Valerie Crespo, CRISN, RN, PHN, HNB-BC, CWOCN

## 2023-01-06 NOTE — PLAN OF CARE
Problem: Plan of Care - These are the overarching goals to be used throughout the patient stay.    Goal: Absence of Hospital-Acquired Illness or Injury  Outcome: Progressing  Intervention: Identify and Manage Fall Risk  Recent Flowsheet Documentation  Taken 1/6/2023 0050 by Colt Ortega RN  Safety Promotion/Fall Prevention:   activity supervised   bed alarm on   increase visualization of patient   lighting adjusted   patient and family education   room organization consistent   safety round/check completed     Problem: Plan of Care - These are the overarching goals to be used throughout the patient stay.    Goal: Optimal Comfort and Wellbeing  Outcome: Progressing   Goal Outcome Evaluation:  Patient is alert to self only. Denied pain/discomfort when asked. No nonverbals indicating pain. Repositioned every 2 hours , fluids offered. Eschars bilateral heels, boots on for protection. Sacral mepilex intact.

## 2023-01-06 NOTE — PLAN OF CARE
"PRIMARY DIAGNOSIS: \"GENERIC\" NURSING  OUTPATIENT/OBSERVATION GOALS TO BE MET BEFORE DISCHARGE:  ADLs back to baseline: Yes    Activity and level of assistance: Up with maximum assistance. Consider SW and/or PT evaluation.     Pain status: Pain free.    Return to near baseline physical activity: Yes     Discharge Planner Nurse   Safe discharge environment identified: Yes  Barriers to discharge: No       Entered by: Rex Azul RN 01/06/2023 12:29 PM   No verbal or nonverbal expressions of pain. Coccyx wound has new orders for hydrocolloid dressing.   Please review provider order for any additional goals.   Nurse to notify provider when observation goals have been met and patient is ready for discharge.Goal Outcome Evaluation:                        "

## 2023-01-06 NOTE — PROGRESS NOTES
Care Management Follow Up    Length of Stay (days): 1    Expected Discharge Date: 01/07/2023     Concerns to be Addressed:     Discharge planning  Patient plan of care discussed at interdisciplinary rounds: Yes    Anticipated Discharge Disposition:  Return to LTC     Anticipated Discharge Services:  n/a  Anticipated Discharge DME:  n/a    Patient/family educated on Medicare website which has current facility and service quality ratings:    Education Provided on the Discharge Plan:  yes  Patient/Family in Agreement with the Plan:  yes    Referrals Placed by CM/SW:  N/a from [facility]  Private pay costs discussed: transportation costs and Not applicable    Additional Information:  PAO spoke with Bhargav [facility]; [place] [facility] and has bed hole. Pt will return when medically stable for discharge. Will need  Health transport per [employer]. Care management following.  2:46 PM    LUCRETIA Kessler

## 2023-01-06 NOTE — PROGRESS NOTES
NEUROLOGY INPATIENT PROGRESS NOTE       [facility] NEUROLOGY   [address] [place] [address]  [email or web address]     ASSESSMENT & PLAN   Date: 01/06/2023  Hospital Day#: 1  Visit diagnosis:  Encephalopathy    Cristal is a 72-year-old woman with known mild cognitive impairment who is recovering from a recent respiratory illness.  She was transferred from a care facility due to increased confusion and less responsiveness.  She appears to be encephalopathic most likely due to the recent illness and metabolic abnormalities.  Neurologic work-up has not shown any evidence of stroke, meningitis or encephalitis or seizure.    Supportive care is recommended at this time and neurology will sign off.    Libertad Saha MD FAAN   of Neurology  [facility]  Clinic: [phone]  Pager: [phone]     PROBLEM LIST      Patient Active Problem List   Diagnosis Code     Cataract H26.9     CAD (coronary artery disease) I25.10     Diabetic nephropathy (H) E11.21     Diabetic neuropathy (H) E11.40     Diabetic retinopathy (H) E11.319     GERD (gastroesophageal reflux disease) K21.9     Glaucoma H40.9     Hypertension I10     Hyperlipidemia E78.5     H/O: stroke Z86.73     Anemia D64.9     Seasonal allergies J30.2     Depression F32.A     Tubular adenoma of colon D12.6     Leg weakness R29.898     Dermatitis, seborrheic L21.9     Eczematous dermatitis L30.9     History of coronary artery disease Z86.79     Venous stasis dermatitis of both lower extremities I87.2     Uncontrolled type 2 diabetes mellitus E11.65     Chronic dermatitis of hands L30.9     Neoplasm of uncertain behavior of skin D48.5     S/P hysterectomy Z90.710     Hypomagnesemia E83.42     Gout M10.9     Diabetic peripheral neuropathy associated with type 2 diabetes mellitus (H) E11.42     CKD (chronic kidney disease) N18.9     Acute chest pain R07.9     Chronic kidney disease, stage 2 (mild) N18.2     Acute metabolic  encephalopathy G93.41     Oropharyngeal dysphagia R13.12     Sacral decubitus ulcer L89.159     Dehydration E86.0     Confusion R41.0       Past Medical History:   Patient  has a past medical history of AION (anterior ischaemic optic neuropathy), left eye, CAD (coronary artery disease) (3/2009), Cataract, CVA (cerebral vascular accident) (H), Depression, Diabetes mellitus, type 2 (H), Diabetic nephropathy (H), Diabetic neuropathy (H), Diabetic retinopathy (H), GERD (gastroesophageal reflux disease), Hyperlipidemia, Hypertension, Infection due to 2019 novel coronavirus (1/4/2023), POAG (primary open-angle glaucoma), Seasonal allergies, and Tubular adenoma of colon (2013).     SUBJECTIVE     Somnolent but more awake than yesterday.  She reports that she does not feel like her normal self and is very tired.  She denies any pain.     OBJECTIVE     Vital signs in last 24 hours  Temp:  [97.7  F (36.5  C)-98.4  F (36.9  C)] 97.7  F (36.5  C)  Pulse:  [76-80] 77  Resp:  [16-20] 16  BP: (133-166)/(64-74) 133/64  SpO2:  [100 %] 100 %    Weight:   Wt Readings from Last 1 Encounters:   12/20/22 69.1 kg (152 lb 4.8 oz)         I/O last 24 hours    Intake/Output Summary (Last 24 hours) at 1/6/2023 1647  Last data filed at 1/6/2023 1414  Gross per 24 hour   Intake 950 ml   Output 1550 ml   Net -600 ml         General Physical Exam:   The patient is asleep when we arrived in the room but she awakens to her name.  Neurological Exam:  Mental status: The patient is very slow to respond to questions.  Sometimes he have to wait a full minute before there is a verbal response.  She is aware that she is at [facility].  She is not aware of the date thinking it is still December.  Speech: Very slow with lots of pauses and sometimes still perseverative but much improved from yesterday.  Cranial nerves: Her face appears to be symmetric with no facial weakness.  Extraocular movements appear to be intact  Motor: Muscle bulk is  difficult to assess due to body habitus.  Muscle tone appears normal in all 4 extremities.  She is not awake enough to cooperate with full strength testing.  Sensory: Light touch sensation in all 4 extremities appears to be normal.       DIAGNOSTIC STUDIES     Pertinent Radiology   Following imaging studies were reviewed:      MRI  IMPRESSION:  1.  No recent infarct, intracranial mass or evidence of intracranial hemorrhage.  2.  Mild to moderate volume loss and presumed chronic small vessel ischemic changes    EEG  Impression: This is an abnormal EEG due to diffusely slow background in theta and delta range that suggests a nonspecific generalized cerebral dysfunction.  Such slowing can be seen in metabolic or toxic abnormalities, clinical correlation is recommended.  No sharp discharges or spikes were recorded during this recording to suggest a seizure disorder.     Classification: Dysrhythmia grade II generalized                            Delta grade I generalized       Echocardiogram  No results found.       Pertinent Labs   Lab Results: Personally Reviewed  Recent Results (from the past 24 hour(s))   Glucose by meter    Collection Time: 01/05/23  4:58 PM   Result Value Ref Range    GLUCOSE BY METER POCT 103 (H) 70 - 99 mg/dL   Glucose by meter    Collection Time: 01/05/23  9:01 PM   Result Value Ref Range    GLUCOSE BY METER POCT 123 (H) 70 - 99 mg/dL   Glucose by meter    Collection Time: 01/06/23 12:34 AM   Result Value Ref Range    GLUCOSE BY METER POCT 145 (H) 70 - 99 mg/dL   Glucose by meter    Collection Time: 01/06/23  4:28 AM   Result Value Ref Range    GLUCOSE BY METER POCT 157 (H) 70 - 99 mg/dL   Basic metabolic panel    Collection Time: 01/06/23  7:10 AM   Result Value Ref Range    Sodium 138 136 - 145 mmol/L    Potassium 4.8 3.4 - 5.3 mmol/L    Chloride 105 98 - 107 mmol/L    Carbon Dioxide (CO2) 18 (L) 22 - 29 mmol/L    Anion Gap 15 7 - 15 mmol/L    Urea Nitrogen 13.4 8.0 - 23.0 mg/dL    Creatinine  0.58 0.51 - 0.95 mg/dL    Calcium 9.4 8.8 - 10.2 mg/dL    Glucose 159 (H) 70 - 99 mg/dL    GFR Estimate >90 >60 mL/min/1.73m2   Glucose by meter    Collection Time: 01/06/23  7:46 AM   Result Value Ref Range    GLUCOSE BY METER POCT 130 (H) 70 - 99 mg/dL   Protein  random urine    Collection Time: 01/06/23 10:03 AM   Result Value Ref Range    Total Protein Urine mg/dL 10.0 1.0 - 14.0 mg/dL    Total Protein UR MG/MG CR 0.43 (H) 0.00 - 0.20 mg/mg Cr    Creatinine Urine mg/dL 23.0 mg/dL   Glucose by meter    Collection Time: 01/06/23 12:06 PM   Result Value Ref Range    GLUCOSE BY METER POCT 141 (H) 70 - 99 mg/dL   Reticulocyte count    Collection Time: 01/06/23 12:15 PM   Result Value Ref Range    % Reticulocyte 2.3 0.8 - 2.7 %    Absolute Reticulocyte 0.076 0.010 - 0.110 10e6/uL   Lactate Dehydrogenase    Collection Time: 01/06/23 12:15 PM   Result Value Ref Range    Lactate Dehydrogenase 188 0 - 250 U/L   Iron and iron binding capacity    Collection Time: 01/06/23 12:15 PM   Result Value Ref Range    Iron 38 37 - 145 ug/dL    Iron Binding Capacity 178 (L) 240 - 430 ug/dL    Iron Sat Index 21 15 - 46 %   CBC with platelets and differential    Collection Time: 01/06/23 12:15 PM   Result Value Ref Range    WBC Count 5.2 4.0 - 11.0 10e3/uL    RBC Count 3.26 (L) 3.80 - 5.20 10e6/uL    Hemoglobin 10.1 (L) 11.7 - 15.7 g/dL    Hematocrit 31.9 (L) 35.0 - 47.0 %    MCV 98 78 - 100 fL    MCH 31.0 26.5 - 33.0 pg    MCHC 31.7 31.5 - 36.5 g/dL    RDW 12.8 10.0 - 15.0 %    Platelet Count 211 150 - 450 10e3/uL    % Neutrophils 53 %    % Lymphocytes 30 %    % Monocytes 14 %    % Eosinophils 3 %    % Basophils 0 %    % Immature Granulocytes 0 %    NRBCs per 100 WBC 0 <1 /100    Absolute Neutrophils 2.7 1.6 - 8.3 10e3/uL    Absolute Lymphocytes 1.6 0.8 - 5.3 10e3/uL    Absolute Monocytes 0.8 0.0 - 1.3 10e3/uL    Absolute Eosinophils 0.2 0.0 - 0.7 10e3/uL    Absolute Basophils 0.0 0.0 - 0.2 10e3/uL    Absolute Immature Granulocytes 0.0  <=0.4 10e3/uL    Absolute NRBCs 0.0 10e3/uL         HOSPITAL MEDICATIONS       aspirin  81 mg Oral Daily     atorvastatin  80 mg Oral Daily     chlorthalidone  25 mg Oral QAM     dorzolamide-timolol  1 drop Both Eyes BID     DULoxetine  90 mg Oral QAM     enoxaparin ANTICOAGULANT  40 mg Subcutaneous Q24H     insulin aspart  1-12 Units Subcutaneous Q4H     insulin glargine  20 Units Subcutaneous QAM AC     latanoprost  1 drop Both Eyes At Bedtime     liraglutide  1.2 mg Subcutaneous Daily     metoprolol tartrate  100 mg Oral BID     olopatadine  1 drop Both Eyes BID     pantoprazole  40 mg Oral QAM AC     polyvinyl alcohol  1 drop Both Eyes TID     risperiDONE  0.5 mg Oral BID     sodium chloride (PF)  3 mL Intracatheter Q8H          This note was dictated using voice recognition software.  Any grammatical or context distortions are unintentional and inherent to the software.

## 2023-01-06 NOTE — DISCHARGE INSTRUCTIONS
WOC DISCHARGE INSTRUCTIONS:  Sacrum:  1. Cleanse with saline, including lloyd wound skin, gently pat dry  2. Apply skin prep to lloyd wound skin, cover with Tegaderm hydrocolloid  Change every other day  If supplies needed from stores, PS# [id number]

## 2023-01-07 NOTE — PLAN OF CARE
Problem: Plan of Care - These are the overarching goals to be used throughout the patient stay.    Goal: Optimal Comfort and Wellbeing  Outcome: Progressing  Denies pain    Problem: Risk for Delirium  Goal: Optimal Coping  Outcome: Progressing  No s/sx of delirium.    Turned x3 overnight. Slept most of night. Did not attempt to get out of bed.

## 2023-01-07 NOTE — DISCHARGE SUMMARY
Care Management Discharge Note    Discharge Date: 01/07/2023       Discharge Disposition:  Return to [facility]      Discharge Services:  Care provided at [facility]    Discharge DME:  N/A    Discharge Transportation:  [facility] via wheelchair today at 1200    Private pay costs discussed: transportation costs    PAS Confirmation Code:  N/A  Patient/family educated on Medicare website which has current facility and service quality ratings:  Yes      Education Provided on the Discharge Plan:  yes  Persons Notified of Discharge Plans: daughter Valerie  Patient/Family in Agreement with the Plan:  yes    Handoff Referral Completed: Yes    Additional Information:  Per hospitalist, patient is ready for discharge today. This writer spoke with admissions at her care facility, [facility], and they are able to accept patient back today. [facility] wheelchair ride arranged for noon. SW called patient's daughter Court - voicemail box is full. Spoke with daughter Valerie. She is in agreement with this plan and expresses understanding of transportation costs. Update patient's nurse Briana SAMUELS to fax orders to [phone].        CATRACHITO MorenoSW

## 2023-01-07 NOTE — PLAN OF CARE
Problem: Plan of Care - These are the overarching goals to be used throughout the patient stay.    Goal: Plan of Care Review  Description: The Plan of Care Review/Shift note should be completed every shift.  The Outcome Evaluation is a brief statement about your assessment that the patient is improving, declining, or no change.  This information will be displayed automatically on your shift note.  Outcome: Progressing   Goal Outcome Evaluation:       No verbal or nonverbal expressions of pain, Hydrocolloid dressing in place at coccyx. Patient able to verbalize needs. Patient will discharge back to her long term facility at 12:00 with [facility]. [facility] will transport with .

## 2023-01-07 NOTE — PLAN OF CARE
Problem: Plan of Care - These are the overarching goals to be used throughout the patient stay.    Goal: Absence of Hospital-Acquired Illness or Injury  Intervention: Identify and Manage Fall Risk  Recent Flowsheet Documentation  Taken 1/6/2023 1600 by Keith Sarmiento RN  Safety Promotion/Fall Prevention:   activity supervised   supervised activity     Problem: Risk for Delirium  Goal: Improved Behavioral Control  Intervention: Minimize Safety Risk  Recent Flowsheet Documentation  Taken 1/6/2023 1600 by Keith Sarmiento RN  Enhanced Safety Measures: bed alarm set   Goal Outcome Evaluation:         Pt alert to self. Elevated b/p. Scheduled antihypertensive medication given. Denied pain. On RA. Blood sugar wnl. Repositioned q 2 hours. On IVF.

## 2023-01-07 NOTE — DISCHARGE SUMMARY
[facility]  Hospitalist Discharge Summary      Date of Admission:  1/3/2023  Date of Discharge:  1/7/2023  Discharging Provider: Anjelica Sharif MD  Discharge Service: Hospitalist Service    Discharge Diagnoses   Encephalopathy most likely due to the recent illness and metabolic abnormalities.    Follow-ups Needed After Discharge   Follow-up Appointments     Follow Up and recommended labs and tests      Follow up with halfway physician.  The following labs/tests are   recommended: LYNN +, follow-up anti-dsDNA and anti-Sm antibodies. BMP.   Lantus dose is reduced to 20 units while in hospital. Please reassess and   adjust at Nursing Home.             Unresulted Labs Ordered in the Past 30 Days of this Admission     Date and Time Order Name Status Description    1/6/2023  7:54 AM Haptoglobin In process     1/6/2023  7:50 AM Moy KAE Antibody IgG In process     1/6/2023  7:50 AM DNA double stranded antibodies In process           Discharge Disposition   Discharged to long-term care facility  Condition at discharge: Stable      Hospital Course   Cristal Preciado is a 71 yo F with h/o CAD, mild cognitive impairment, DM2 with neuropathy/retinopathy/nephropathy, GERD, HTN, HLD, h/o stroke, MDD, and CKD2 who was recently diagnosed with UTI and COVID at [facility] 12/29/22 and presents with ongoing worsening encephalopathy of unclear etiology for the past 1-2 weeks.  Will check head MRI and have neurology evaluate since this seems worse than what was described when she was at [facility] last week.  This could all still be due to multiple stressors affecting a somewhat frail brain (with previous strokes she does have mild cognitive impairment reportedly which is being exacerbated by stress of COVD infection).    Acute metabolic encephalopathy   - unclear etiology.    - No reported history currently of focal findings to suggest new stroke.    - This may be a combination of things including stress from  moving into a new nursing home 1 month ago along with the holidays and getting COVID sometime in the last week or so along with recent UTI that was treated.  Per [facility] she does have some underlying mild cognitive impairment that all these stressors are exacerbating.    - was admitted to [facility] 12/29/22 with encephalopathy  - 01/05 MRI-brain: no acute issues. B12 792. LYNN positive  - f/u EEG per neurology consult  - SLP joan appreciated  - 01/06: EEG: abnormal EEG due to diffusely slow background in theta and delta range that suggests a nonspecific generalized cerebral dysfunction. Such slowing can be seen in metabolic or toxic abnormalities, clinical correlation is recommended. No sharp discharges or spikes were recorded during this recording to suggest a seizure disorder.  - 01/07: Neurology recommendation appreciated. Encephalopathy most likely due to the recent illness and metabolic abnormalities.    Infection due to 2019 novel coronavirus    - diagnosed 12/25/2022 at [facility] - no meds, asymptomatic   CAD (coronary artery disease)  - c/w PTA lipitor, metoprolol, and ASA  Diabetic neuropathy/nephropathy/retinopathy  - C/w PTA cymbalta  GERD (gastroesophageal reflux disease)   - pantoprazole  Hypertension   - c/w PTA chlorthalidone, metoprolol  Hyperlipidemia  - c/w PTA Lipitor  H/o stroke   - c/w PTA aspirin and Lipitor  Depression   - c/w PTA Cymbalta  Uncontrolled type 2 diabetes mellitus   - c/w PTA victoza  -accu-checks, lantus 20 units every day, insulin sliding scale  Chronic kidney disease, stage 2 (mild)   - follow with gentle hydration  Sacral decubitus ulcer  - seen at [facility] on 12/29/22   - WOC consult appreciated.  Glaucoma   - c/w PTA eye drops  Mild cognitive impairment   - c/w PTA risperidone    Patient is clinically and hemodynamically stable for discharge to NH. Encephalopathy is felt to be most likely due to the recent illness and metabolic abnormalities. Medication  reconciliation was done. Follow-up appointments, tests, and recommendations as seen above. Unable to contact daughter Court. SW updated daughter Valerie regarding discharge.     Consultations This Hospital Stay   WOUND OSTOMY CONTINENCE NURSE  IP CONSULT  NEUROLOGY IP CONSULT  NEUROLOGY IP CONSULT  SPEECH LANGUAGE PATH ADULT IP CONSULT  CARE MANAGEMENT / SOCIAL WORK IP CONSULT  PHYSICAL THERAPY ADULT IP CONSULT  OCCUPATIONAL THERAPY ADULT IP CONSULT    Code Status   Full Code    Time Spent on this Encounter   I, Anjelica Sharif MD, personally saw the patient today and spent greater than 30 minutes discharging this patient.       Anjelica Sharif MD  [address] [address]  Phone: [phone]  Fax: [phone]  ______________________________________________________________________    Physical Exam   Vital Signs: Temp: 97.4  F (36.3  C) Temp src: Oral BP: (!) 177/77 Pulse: 60   Resp: 18 SpO2: 98 % O2 Device: None (Room air)    Weight: 0 lbs 0 oz  GEN: Alert and oriented to self and place. Not in acute distress.  HEENT: Atraumatic, mucous membrane- moist and pink.  Chest: Bilateral air entry.  CVS: S1S2 regular. No murmurs, rubs or gallops.  Abdomen: Soft. Non-tender, non-distended. No organomegaly. No guarding or rigidity. Bowel sounds active.   Extremities: No pedal edema.  CNS: No focal neurologic deficit. No involuntary movements.  Skin: No skin rash, no cyanosis or clubbing.        Primary Care Physician   Aditi Allen    Discharge Orders      General info for SNF    Length of Stay Estimate: Long Term Care  Condition at Discharge: Stable  Level of care:skilled   Rehabilitation Potential: Poor  Admission H&P remains valid and up-to-date: Yes  Recent Chemotherapy: N/A  Use Nursing Home Standing Orders: Yes     Mantoux instructions    Give two-step Mantoux (PPD) Per Facility Policy Yes     Follow Up and recommended labs and tests    Follow up  with Nursing home physician.  The following labs/tests are recommended: LYNN +, follow-up anti-dsDNA and anti-Sm antibodies. BMP. Lantus dose is reduced to 20 units while in hospital. Please reassess and adjust at Nursing Home.     Reason for your hospital stay    Acute metabolic encephalopathy.     Activity - Up with nursing assistance     Fall precautions     Diet    Follow this diet upon discharge: Orders Placed This Encounter      Combination Diet Pureed Diet (level 4); Thin Liquids (level 0)       Significant Results and Procedures   Most Recent 3 BMP's:Recent Labs   Lab Test 01/07/23  0727 01/07/23  0608 01/07/23  0546 01/06/23  0746 01/06/23  0710 01/05/23  1225 01/05/23  0900   NA  --  137  --   --  138  --  138   POTASSIUM  --  4.5  --   --  4.8  --  3.4   CHLORIDE  --  104  --   --  105  --  105   CO2  --  21*  --   --  18*  --  22   BUN  --  10.0  --   --  13.4  --  13.4   CR  --  0.59  0.59  --   --  0.58  --  0.60   ANIONGAP  --  12  --   --  15  --  11   OLAF  --  9.3  --   --  9.4  --  9.2   * 115* 99   < > 159*   < > 146*    < > = values in this interval not displayed.       Discharge Medications   Current Discharge Medication List      START taking these medications    Details   insulin glargine (LANTUS PEN) 100 UNIT/ML pen Inject 20 Units Subcutaneous every morning (before breakfast)  Qty: 15 mL    Comments: If Lantus is not covered by insurance, may substitute Basaglar or Semglee or other insulin glargine product per insurance preference at same dose and frequency.    Associated Diagnoses: Type 2 diabetes mellitus with hyperglycemia, with long-term current use of insulin (H)         CONTINUE these medications which have NOT CHANGED    Details   acetaminophen (TYLENOL) 500 MG tablet Take 1,000 mg by mouth 3 times daily      ASPIRIN LOW DOSE 81 MG chewable tablet CHEW AND SWALLOW 1 TAB BY MOUTH ONCE DAILY IN THE MORNING  Refills: 99      atorvastatin (LIPITOR) 80 MG tablet Take 1 tablet by  mouth daily. Pt needs to have labs done prior to further refills.  Qty: 90 tablet, Refills: 0    Associated Diagnoses: Other and unspecified hyperlipidemia      bisacodyl (DULCOLAX) 10 MG suppository Place 10 mg rectally daily as needed for constipation      CHLORTHALIDONE PO Take 25 mg by mouth every morning       diclofenac (VOLTAREN) 1 % topical gel Apply 2 g topically 4 times daily      dorzolamide-timolol (COSOPT) 2-0.5 % ophthalmic solution Place 1 drop into both eyes 2 times daily  Qty: 10 mL, Refills: 3    Associated Diagnoses: Primary open angle glaucoma (POAG) of both eyes, severe stage      DULoxetine HCl (CYMBALTA PO) Take 90 mg by mouth every morning       hydrocortisone, Perianal, (HYDROCORTISONE) 2.5 % cream Place rectally 2 times daily as needed for hemorrhoids  Qty: 60 g, Refills: 0    Associated Diagnoses: External hemorrhoids      !! insulin lispro (HUMALOG KWIKPEN) 100 UNIT/ML (1 unit dial) KWIKPEN Inject Subcutaneous 3 times daily (before meals) 140-189=1 unit  190-239= 2 units  240-289= 3 units  290-339= 4 units  340-399= 5 units  400-999= 6 units      !! insulin lispro (HUMALOG KWIKPEN) 100 UNIT/ML (1 unit dial) KWIKPEN Inject Subcutaneous At Bedtime 200-249=1 unit  250-299= 2 units  300-349= 3 units  350-399= 4 units  400-999= 5units      ketoconazole (NIZORAL) 2 % shampoo To entire wet scalp and ears and then wash off after 5 minutes three times a week.  Qty: 240 mL, Refills: 11    Associated Diagnoses: Dermatitis, seborrheic      latanoprost (XALATAN) 0.005 % ophthalmic solution Place 1 drop into both eyes At Bedtime  Qty: 5 mL, Refills: 2    Associated Diagnoses: Primary open angle glaucoma (POAG) of both eyes, severe stage      liraglutide (VICTOZA PEN) 18 MG/3ML solution Inject subcu 1.2mg daily.  Qty: 9 mL, Refills: 1    Associated Diagnoses: Type 2 diabetes mellitus with diabetic nephropathy, with long-term current use of insulin (H)      loperamide (IMODIUM A-D) 2 MG tablet Take 2 mg  by mouth 4 times daily as needed for diarrhea      loratadine (CLARITIN) 10 MG tablet Take 10 mg by mouth as needed.      metFORMIN (GLUCOPHAGE-XR) 500 MG 24 hr tablet Take 2 tablets (1,000 mg) by mouth daily (with dinner)  Qty: 180 tablet, Refills: 3    Associated Diagnoses: Well controlled type 2 diabetes mellitus (H)      metoprolol tartrate (LOPRESSOR) 100 MG tablet Take 100 mg by mouth 2 times daily      nitroGLYCERIN (NITROSTAT) 0.4 MG SL tablet Place 0.4 mg under the tongue every 5 minutes as needed.      olopatadine (PATADAY) 0.2 % ophthalmic solution Place 1 drop into both eyes daily For itchy eyes  Qty: 2.5 mL, Refills: 3    Associated Diagnoses: History of itching of eye      omeprazole (PRILOSEC) 20 MG DR capsule Take 20 mg by mouth daily      polyvinyl alcohol (LIQUIFILM TEARS) 1.4 % ophthalmic solution Place 1 drop into both eyes 3 times daily      risperiDONE (RISPERDAL) 0.5 MG tablet Take 0.5 mg by mouth 2 times daily X 5 days for delirium      senna-docusate (SENOKOT-S/PERICOLACE) 8.6-50 MG tablet Take 2 tablets by mouth 2 times daily as needed for constipation  Qty: 60 tablet, Refills: 0    Associated Diagnoses: S/P hysterectomy      simethicone (MYLICON) 125 MG chewable tablet Take 125 mg by mouth 4 times daily as needed After meals and HS prn      White Petrolatum-Mineral Oil (REFRESH P.M.) OINT Apply to eye nightly as needed       !! - Potential duplicate medications found. Please discuss with provider.      STOP taking these medications       folic acid (FOLVITE) 1 MG tablet Comments:   Reason for Stopping:         insulin glargine (SEMGLEE) 100 UNIT/ML vial Comments:   Reason for Stopping:             Allergies   Allergies   Allergen Reactions     Dust Mites Other (See Comments)     Sneezing runny eyes and nose.     Food Allergy Formula Hives     Mountain Dew and Walnuts     Pollen Extract Other (See Comments)     Sneezing runny eyes and nose.

## 2023-01-10 NOTE — TELEPHONE ENCOUNTER
Labs arrived and so called in to on -call    Component      Latest Ref Rng & Units 1/9/2023   Sodium      136 - 145 mmol/L 139   Potassium      3.4 - 5.3 mmol/L 4.2   Chloride      98 - 107 mmol/L 105   Carbon Dioxide (CO2)      22 - 29 mmol/L 20 (L)   Anion Gap      7 - 15 mmol/L 14   Urea Nitrogen      8.0 - 23.0 mg/dL 17.9   Creatinine      0.51 - 0.95 mg/dL 0.89   Calcium      8.8 - 10.2 mg/dL 9.4   Glucose      70 - 99 mg/dL 93   GFR Estimate      >60 mL/min/1.73m2 69     No new orders  Will pass on to regular NP    LAMONT Lang CNP

## 2023-01-13 NOTE — PROGRESS NOTES
[facility] GERIATRICS  Chief Complaint   Patient presents with     Annual Comprehensive Nursing Home     Hospital F/U     [facility] Record Number:  [id number]  Place of Service where encounter took place:  [facility] () [[id number]]    HPI:    Cristal Preciado  is a 72 year old  (1950), who is being seen today for an annual comprehensive visit. HPI information obtained from: facility chart records, facility staff, patient report, [facility] chart review and Care Everywhere Epic chart review. Hospital stay 1/3-1/7/23 at [facility].    Patient is being seen today for her annual examination - all relevant health concerns patient has experienced over the past year and/or chronic illnesses will be assessed during today's visit. Per staff report, she has not mainted functional status, she continues to slowly decline overall - Cognitively the patient remains at admission baseline. Last available labs as noted below. Patient with above noted changes in status - will attain yearly labs and address concerns as noted below. She is also being seen for a hospital follow-up visit as noted below.     Brief Summary of Hospital Course:   Patient hospitalized during above noted dates following worsening encephalopathy for 1-2 weeks - previously hospitalized at [facility] on 12/29 and dx with UTI and COVID. Work-up for worsening of cognition unclear for etiology - suspected to be 2/2 stress from recent move, holidays, COVID and UTI in addition to underlying MCI; MRI negative; EEG - abnormal EEG due to diffusely slow background in theta and delta range that suggests a nonspecific generalized cerebral dysfunction. Such slowing can be seen in metabolic or toxic abnormalities, clinical correlation is recommended. No sharp discharges or spikes were recorded during this recording to suggest a seizure disorder. Neurology with no recommendations feeling as though its related to above noted factors as  well.  PTA dx of COVID, CAD, GERD, HTN, HLD, H/o stroke, Depression, DM2, CKD, Sacral ulcer, Glaucoma and MCI remained stable at baseline with PTA regimen.    Updates on Status Since Skilled nursing Admission:   No acute concerns noted today. Therapies ordered for eval/tx. Appetite continues to decline. She is sleeping well. Denies CP, palpitations, fatigue, nausea, vomiting, increased SOB/LUCERO, fever, chills, and/or b/b concerns today.    ALLERGIES: Dust mites, Food allergy formula, and Pollen extract  PAST MEDICAL HISTORY:   Past Medical History:   Diagnosis Date     AION (anterior ischaemic optic neuropathy), left eye     NAION LE     CAD (coronary artery disease) 3/2009    [facility]; Angio 2013 [facility]- normal coronary arteries     Cataract      CVA (cerebral vascular accident) (H)     admitted at [facility]     on Cymbalta     Diabetes mellitus, type 2 (H)      Diabetic nephropathy (H)      Diabetic neuropathy (H)     severe     Diabetic retinopathy (H)      GERD (gastroesophageal reflux disease)      Hyperlipidemia      Hypertension     ECHO 2013, TDS, NL EF     Infection due to  novel coronavirus 2023     POAG (primary open-angle glaucoma)     adv BE     Seasonal allergies      Tubular adenoma of colon 2013    repeat colonoscopy in 2018      PAST SURGICAL HISTORY:  has a past surgical history that includes Extracapsular cataract extration with intraocular lens implant (11-10-09, 2-9-10); Colonoscopy (7/15/2013);  section; stent, coronary, lainey (); DaVINCI hysterectomy total, bilateral salpingo-oophorectomy, combined (N/A, 2019); Colonoscopy (N/A, 2020); Coronary Stent Placement (2004); Cardiac catheterization; Hysterectomy total abdominal, bilateral salpingo-oophorectomy, combined (2019); Cataract Extraction W/ Intraocular Lens Implant (Bilateral); and  Section.  IMMUNIZATIONS:  Immunization History   Administered Date(s) Administered      COVID-19 Vaccine 18+ (Moderna) 12/30/2020, 01/27/2021, 11/23/2021, 05/09/2022     COVID-19 Vaccine Bivalent Booster 12+ (Pfizer) 09/23/2022     FLU 6-35 months 12/03/2009, 09/24/2010     Flu, Unspecified 10/26/2016, 10/12/2017, 10/15/2018, 10/11/2019, 10/22/2020     Influenza (High Dose) 3 valent vaccine 10/15/2018, 10/11/2019, 10/22/2020, 10/18/2021, 10/20/2022     Influenza (IIV3) PF 11/04/2003, 11/18/2004, 11/10/2005, 10/19/2006, 12/03/2009, 10/24/2011, 11/01/2012, 10/21/2015     Influenza Vaccine >6 months (Alfuria,Fluzone) 09/24/2014     Influenza Vaccine IM Ages 6-35 Months 4 Valent (PF) 10/18/2021     Pneumo Conj 13-V (2010&after) 07/21/2016     Pneumococcal (PCV 7) 11/04/2003     Pneumococcal 23 valent 11/04/2003, 10/24/2011     TD (ADULT, 7+) 05/13/2003     TDAP Vaccine (Boostrix) 06/12/2014     Tdap (Adacel,Boostrix) 06/12/2014     Zoster vaccine, live 12/12/2013     Above immunizations pulled from [facility]. [facility] and facility records also reconciled. Outstanding information sent to  to update [facility].  Future immunizations needed:  PPSV23 and Shinrix - Will plan to administer once medically stable      Current Outpatient Medications:      acetaminophen (TYLENOL) 500 MG tablet, Take 1,000 mg by mouth 3 times daily, Disp: , Rfl:      ASPIRIN LOW DOSE 81 MG chewable tablet, CHEW AND SWALLOW 1 TAB BY MOUTH ONCE DAILY IN THE MORNING, Disp: , Rfl: 99     atorvastatin (LIPITOR) 80 MG tablet, Take 1 tablet by mouth daily. Pt needs to have labs done prior to further refills., Disp: 90 tablet, Rfl: 0     bisacodyl (DULCOLAX) 10 MG suppository, Place 10 mg rectally daily as needed for constipation, Disp: , Rfl:      CHLORTHALIDONE PO, Take 25 mg by mouth every morning , Disp: , Rfl:      diclofenac (VOLTAREN) 1 % topical gel, Apply 2 g topically 4 times daily, Disp: , Rfl:      dorzolamide-timolol (COSOPT) 2-0.5 % ophthalmic solution, Place 1 drop into both eyes 2 times daily, Disp: 10 mL,  Rfl: 3     DULoxetine HCl (CYMBALTA PO), Take 90 mg by mouth every morning , Disp: , Rfl:      hydrocortisone, Perianal, (HYDROCORTISONE) 2.5 % cream, Place rectally 2 times daily as needed for hemorrhoids, Disp: 60 g, Rfl: 0     insulin glargine (LANTUS PEN) 100 UNIT/ML pen, Inject 20 Units Subcutaneous every morning (before breakfast), Disp: 15 mL, Rfl:      insulin lispro (HUMALOG KWIKPEN) 100 UNIT/ML (1 unit dial) KWIKPEN, Inject Subcutaneous 3 times daily (before meals) 140-189=1 unit 190-239= 2 units 240-289= 3 units 290-339= 4 units 340-399= 5 units 400-999= 6 units, Disp: , Rfl:      insulin lispro (HUMALOG KWIKPEN) 100 UNIT/ML (1 unit dial) KWIKPEN, Inject Subcutaneous At Bedtime 200-249=1 unit 250-299= 2 units 300-349= 3 units 350-399= 4 units 400-999= 5units, Disp: , Rfl:      ketoconazole (NIZORAL) 2 % shampoo, To entire wet scalp and ears and then wash off after 5 minutes three times a week. (Patient taking differently: To entire wet scalp and ears and then wash off after 5 minutes Tuesday and Friday), Disp: 240 mL, Rfl: 11     latanoprost (XALATAN) 0.005 % ophthalmic solution, Place 1 drop into both eyes At Bedtime, Disp: 5 mL, Rfl: 2     liraglutide (VICTOZA PEN) 18 MG/3ML solution, Inject subcu 1.2mg daily., Disp: 9 mL, Rfl: 1     loperamide (IMODIUM A-D) 2 MG tablet, Take 2 mg by mouth 4 times daily as needed for diarrhea, Disp: , Rfl:      loratadine (CLARITIN) 10 MG tablet, Take 10 mg by mouth as needed., Disp: , Rfl:      metFORMIN (GLUCOPHAGE-XR) 500 MG 24 hr tablet, Take 2 tablets (1,000 mg) by mouth daily (with dinner), Disp: 180 tablet, Rfl: 3     metoprolol tartrate (LOPRESSOR) 100 MG tablet, Take 100 mg by mouth 2 times daily, Disp: , Rfl:      nitroGLYCERIN (NITROSTAT) 0.4 MG SL tablet, Place 0.4 mg under the tongue every 5 minutes as needed., Disp: , Rfl:      olopatadine (PATADAY) 0.2 % ophthalmic solution, Place 1 drop into both eyes daily For itchy eyes, Disp: 2.5 mL, Rfl: 3      omeprazole (PRILOSEC) 20 MG DR capsule, Take 20 mg by mouth daily, Disp: , Rfl:      polyvinyl alcohol (LIQUIFILM TEARS) 1.4 % ophthalmic solution, Place 1 drop into both eyes 3 times daily, Disp: , Rfl:      risperiDONE (RISPERDAL) 0.5 MG tablet, Take 0.5 mg by mouth 2 times daily X 5 days for delirium, Disp: , Rfl:      senna-docusate (SENOKOT-S/PERICOLACE) 8.6-50 MG tablet, Take 2 tablets by mouth 2 times daily as needed for constipation, Disp: 60 tablet, Rfl: 0     simethicone (MYLICON) 125 MG chewable tablet, Take 125 mg by mouth 4 times daily as needed After meals and HS prn, Disp: , Rfl:      White Petrolatum-Mineral Oil (REFRESH P.M.) OINT, Apply to eye nightly as needed, Disp: , Rfl:      MED REC REQUIRED  Post Medication Reconciliation Status: discharge medications reconciled, continue medications without change      Case Management:  I have reviewed the facility/SNF care plan/MDS, including the falls risk, nutrition and pain screening. I also reviewed the current immunizations, and preventive care.. Cancer screenings up-to-date at this time Patient's desire to return to the community is not assessible due to cognitive impairment. Current Level of Care is appropriate.    Advance Directive Discussion:    I reviewed the current advanced directives as reflected in EPIC, the POLST and the facility chart, and verified the congruency of orders. The first party daughter, recently contact and discussed the plan of Care.  I did not due to cognitive impairment review the advance directives with the resident. Patient's goal is family's/responsible party's goal for the patient is to improve her health and find out what is going on.    Team Discussion:  I communicated with the appropriate disciplines involved with the Plan of Care:   Nursing  ,    and Dietitian    Information reviewed:  Medications, vital signs, orders, and nursing notes.    ROS:  Unobtainable secondary to cognitive impairment.      Vitals:  /63   Pulse 77   Temp 97.5  F (36.4  C)   Wt 65.9 kg (145 lb 3.2 oz)   BMI 25.72 kg/m   Body mass index is 25.72 kg/m .  Exam:  GENERAL APPEARANCE:  Alert, in no distress, appears healthy, somnolent, cooperative, elderly female upright in wheelchair  EYES:  EOM, conjunctivae, lids, pupils and irises normal, wears glasses  RESP:  respiratory effort and palpation of chest normal, lungs clear to auscultation , no respiratory distress  CV:  Palpation and auscultation of heart done , regular rate and rhythm, no murmur, rub, or gallop, +2 pedal pulses  ABDOMEN:  normal bowel sounds, soft, nontender, no hepatosplenomegaly or other masses  M/S:   Gait and station abnormal - Non-ambulatory at baseline  Digits and nails abnormal - arthritic changes present  SKIN:  Inspection of skin and subcutaneous tissue baseline, Palpation of skin and subcutaneous tissue baseline, wound appearance: [facility] 2/2 patient being in wheelchair  NEURO:   Cranial nerves 2-12 are normal tested and grossly at patient's baseline  PSYCH:  oriented to 1-2, insight and judgement impaired, memory impaired , affect and mood normal     Lab/Diagnostic data:   Recent labs in [facility] reviewed by me today.     ASSESSMENT/PLAN    ICD-10-CM    1. Polyneuropathy associated with underlying disease (H)  G63 Chronic - stable. No s/sx of acute pain today. Continue Cymbalta as ordered with ongoing monitoring.      2. Type 2 diabetes mellitus with other specified complication, with long-term current use of insulin (H)  E11.69 Chronic - variable. Recent A1c <8.0 as shown below. Recent BG levels as noted. Continue current regimen of Metformin and insulin as ordered with ongoing monitoring of A1c and BG.  Lab Results   Component Value Date    A1C 7.6 01/03/2023    A1C 6.9 08/17/2022    A1C 8.1 06/17/2021    A1C 6.5 11/27/2020    A1C 6.9 03/18/2020    A1C 6.6 07/23/2019    A1C 7.5 10/24/2016    A1C 7.5 10/12/2015    A1C 8.1 03/06/2014    A1C 7.2 03/07/2013        Z79.4       3. Stage 3 chronic kidney disease, unspecified whether stage 3a or 3b CKD (H)  N18.30 Chronic - unstable, tenuous. Kidney status variable with patient's decreasing PO intake. Recent BMP noted - ongoing monitoring of BMP and avoidance of nephrotoxic agents      4. Moderate dementia without behavioral disturbance, psychotic disturbance, mood disturbance, or anxiety, unspecified dementia type  F03.B0 Chronic - unstable, progressive. Patient appears to be progressing within this diagnosis overall - agree likely due to many confounding factors. No active tx regimen at this time. Monitor.    5. Encephalopathy  G93.40       6. Dysphagia, unspecified type  R13.10 Chronic - unstable. Patient with weight loss as   7. Weight loss  R63.4 Noted per HPI with recent weight as noted below. Ongoing management per in-house dietician with interventions as needed.         8. Chronic pain syndrome  G89.4 Chronic - variable. No reports of pain concerns today. Continue Tylenol and Cymbalta as ordered with ongoing monitoring.      9. Benign hypertensive kidney disease with chronic kidney disease stage I through stage IV, or unspecified  I12.9 Chronic - stable. Recent BPs as shown below. Continue regimen of Metoprolol and Chlorithalidone as ordered with ongoing monitoring of VS.        10. Hyperlipidemia, unspecified hyperlipidemia type  E78.5 Chronic - variable. Last FLP as noted below - continue regimen of Lipitor as ordered with ongoing monitoring of FLP.  Recent Labs   Lab Test 11/18/21  0544 11/27/20  0456   CHOL 92 103   HDL 26* 32*   LDL 30 43   TRIG 180* 138             Electronically signed by:  Dr. Morelia Allen, APRN, DNP, AGNP-BC, PMHNP-[facility]  Office Hours: Tues-Fri [id number]  Office: [address] [place] [address]  Fax - [phone]  Triage Phone - [phone]  Business Office- [phone]      Email: [email or web address]

## 2023-01-13 NOTE — LETTER
1/13/2023        RE: Cristal Preciado  [address]  [address]  [place] [address]        [facility] GERIATRICS  Chief Complaint   Patient presents with     Annual Comprehensive Nursing [place]     Hospital F/U     [facility] Record Number:  [id number]  Place of Service where encounter took place:  [facility] () [[id number]]    HPI:    Cristal Preciado  is a 72 year old  (1950), who is being seen today for an annual comprehensive visit. HPI information obtained from: facility chart records, facility staff, patient report, [facility] chart review and Care Everywhere [facility] chart review. Hospital stay 1/3-1/7/23 at [facility].    Patient is being seen today for her annual examination - all relevant health concerns patient has experienced over the past year and/or chronic illnesses will be assessed during today's visit. Per staff report, she has not mainted functional status, she continues to slowly decline overall - Cognitively the patient remains at admission baseline. Last available labs as noted below. Patient with above noted changes in status - will attain yearly labs and address concerns as noted below. She is also being seen for a hospital follow-up visit as noted below.     Brief Summary of Hospital Course:   Patient hospitalized during above noted dates following worsening encephalopathy for 1-2 weeks - previously hospitalized at [facility] on 12/29 and dx with UTI and COVID. Work-up for worsening of cognition unclear for etiology - suspected to be 2/2 stress from recent move, holidays, COVID and UTI in addition to underlying MCI; MRI negative; EEG - abnormal EEG due to diffusely slow background in theta and delta range that suggests a nonspecific generalized cerebral dysfunction. Such slowing can be seen in metabolic or toxic abnormalities, clinical correlation is recommended. No sharp discharges or spikes were recorded during this  recording to suggest a seizure disorder. Neurology with no recommendations feeling as though its related to above noted factors as well.  PTA dx of COVID, CAD, GERD, HTN, HLD, H/o stroke, Depression, DM2, CKD, Sacral ulcer, Glaucoma and MCI remained stable at baseline with PTA regimen.    Updates on Status Since Skilled nursing Admission:   No acute concerns noted today. Therapies ordered for eval/tx. Appetite continues to decline. She is sleeping well. Denies CP, palpitations, fatigue, nausea, vomiting, increased SOB/LUCERO, fever, chills, and/or b/b concerns today.    ALLERGIES: Dust mites, Food allergy formula, and Pollen extract  PAST MEDICAL HISTORY:   Past Medical History:   Diagnosis Date     AION (anterior ischaemic optic neuropathy), left eye     NAION LE     CAD (coronary artery disease) 3/2009    [facility]; Angio 2013 [facility]- normal coronary arteries     Cataract      CVA (cerebral vascular accident) (H)     admitted at [facility]     on Cymbalta     Diabetes mellitus, type 2 (H)      Diabetic nephropathy (H)      Diabetic neuropathy (H)     severe     Diabetic retinopathy (H)      GERD (gastroesophageal reflux disease)      Hyperlipidemia      Hypertension     ECHO 2013, TDS, NL EF     Infection due to 2019 novel coronavirus 2023     POAG (primary open-angle glaucoma)     adv BE     Seasonal allergies      Tubular adenoma of colon 2013    repeat colonoscopy in 2018      PAST SURGICAL HISTORY:  has a past surgical history that includes Extracapsular cataract extration with intraocular lens implant (11-10-09, 2-9-10); Colonoscopy (7/15/2013);  section; stent, coronary, lainey (); DaVINCI hysterectomy total, bilateral salpingo-oophorectomy, combined (N/A, 2019); Colonoscopy (N/A, 2020); Coronary Stent Placement (2004); Cardiac catheterization; Hysterectomy total abdominal, bilateral salpingo-oophorectomy, combined (2019); Cataract Extraction W/ Intraocular  Lens Implant (Bilateral); and  Section.  IMMUNIZATIONS:  Immunization History   Administered Date(s) Administered     COVID-19 Vaccine 18+ (Moderna) 2020, 2021, 2021, 2022     COVID-19 Vaccine Bivalent Booster 12+ (Pfizer) 2022     FLU 6-35 months 2009, 2010     Flu, Unspecified 10/26/2016, 10/12/2017, 10/15/2018, 10/11/2019, 10/22/2020     Influenza (High Dose) 3 valent vaccine 10/15/2018, 10/11/2019, 10/22/2020, 10/18/2021, 10/20/2022     Influenza (IIV3) PF 2003, 2004, 11/10/2005, 10/19/2006, 2009, 10/24/2011, 2012, 10/21/2015     Influenza Vaccine >6 months (Alfuria,Fluzone) 2014     Influenza Vaccine IM Ages 6-35 Months 4 Valent (PF) 10/18/2021     Pneumo Conj 13-V (2010&after) 2016     Pneumococcal (PCV 7) 2003     Pneumococcal 23 valent 2003, 10/24/2011     TD (ADULT, 7+) 2003     TDAP Vaccine (Boostrix) 2014     Tdap (Adacel,Boostrix) 2014     Zoster vaccine, live 2013     Above immunizations pulled from [facility]. [facility] and facility records also reconciled. Outstanding information sent to  to update [facility].  Future immunizations needed:  PPSV23 and Shinrix - Will plan to administer once medically stable      Current Outpatient Medications:      acetaminophen (TYLENOL) 500 MG tablet, Take 1,000 mg by mouth 3 times daily, Disp: , Rfl:      ASPIRIN LOW DOSE 81 MG chewable tablet, CHEW AND SWALLOW 1 TAB BY MOUTH ONCE DAILY IN THE MORNING, Disp: , Rfl: 99     atorvastatin (LIPITOR) 80 MG tablet, Take 1 tablet by mouth daily. Pt needs to have labs done prior to further refills., Disp: 90 tablet, Rfl: 0     bisacodyl (DULCOLAX) 10 MG suppository, Place 10 mg rectally daily as needed for constipation, Disp: , Rfl:      CHLORTHALIDONE PO, Take 25 mg by mouth every morning , Disp: , Rfl:      diclofenac (VOLTAREN) 1 % topical gel, Apply 2 g topically 4 times daily, Disp:  , Rfl:      dorzolamide-timolol (COSOPT) 2-0.5 % ophthalmic solution, Place 1 drop into both eyes 2 times daily, Disp: 10 mL, Rfl: 3     DULoxetine HCl (CYMBALTA PO), Take 90 mg by mouth every morning , Disp: , Rfl:      hydrocortisone, Perianal, (HYDROCORTISONE) 2.5 % cream, Place rectally 2 times daily as needed for hemorrhoids, Disp: 60 g, Rfl: 0     insulin glargine (LANTUS PEN) 100 UNIT/ML pen, Inject 20 Units Subcutaneous every morning (before breakfast), Disp: 15 mL, Rfl:      insulin lispro (HUMALOG KWIKPEN) 100 UNIT/ML (1 unit dial) KWIKPEN, Inject Subcutaneous 3 times daily (before meals) 140-189=1 unit 190-239= 2 units 240-289= 3 units 290-339= 4 units 340-399= 5 units 400-999= 6 units, Disp: , Rfl:      insulin lispro (HUMALOG KWIKPEN) 100 UNIT/ML (1 unit dial) KWIKPEN, Inject Subcutaneous At Bedtime 200-249=1 unit 250-299= 2 units 300-349= 3 units 350-399= 4 units 400-999= 5units, Disp: , Rfl:      ketoconazole (NIZORAL) 2 % shampoo, To entire wet scalp and ears and then wash off after 5 minutes three times a week. (Patient taking differently: To entire wet scalp and ears and then wash off after 5 minutes Tuesday and Friday), Disp: 240 mL, Rfl: 11     latanoprost (XALATAN) 0.005 % ophthalmic solution, Place 1 drop into both eyes At Bedtime, Disp: 5 mL, Rfl: 2     liraglutide (VICTOZA PEN) 18 MG/3ML solution, Inject subcu 1.2mg daily., Disp: 9 mL, Rfl: 1     loperamide (IMODIUM A-D) 2 MG tablet, Take 2 mg by mouth 4 times daily as needed for diarrhea, Disp: , Rfl:      loratadine (CLARITIN) 10 MG tablet, Take 10 mg by mouth as needed., Disp: , Rfl:      metFORMIN (GLUCOPHAGE-XR) 500 MG 24 hr tablet, Take 2 tablets (1,000 mg) by mouth daily (with dinner), Disp: 180 tablet, Rfl: 3     metoprolol tartrate (LOPRESSOR) 100 MG tablet, Take 100 mg by mouth 2 times daily, Disp: , Rfl:      nitroGLYCERIN (NITROSTAT) 0.4 MG SL tablet, Place 0.4 mg under the tongue every 5 minutes as needed., Disp: , Rfl:       olopatadine (PATADAY) 0.2 % ophthalmic solution, Place 1 drop into both eyes daily For itchy eyes, Disp: 2.5 mL, Rfl: 3     omeprazole (PRILOSEC) 20 MG DR capsule, Take 20 mg by mouth daily, Disp: , Rfl:      polyvinyl alcohol (LIQUIFILM TEARS) 1.4 % ophthalmic solution, Place 1 drop into both eyes 3 times daily, Disp: , Rfl:      risperiDONE (RISPERDAL) 0.5 MG tablet, Take 0.5 mg by mouth 2 times daily X 5 days for delirium, Disp: , Rfl:      senna-docusate (SENOKOT-S/PERICOLACE) 8.6-50 MG tablet, Take 2 tablets by mouth 2 times daily as needed for constipation, Disp: 60 tablet, Rfl: 0     simethicone (MYLICON) 125 MG chewable tablet, Take 125 mg by mouth 4 times daily as needed After meals and HS prn, Disp: , Rfl:      White Petrolatum-Mineral Oil (REFRESH P.M.) OINT, Apply to eye nightly as needed, Disp: , Rfl:      MED REC REQUIRED  Post Medication Reconciliation Status: discharge medications reconciled, continue medications without change      Case Management:  I have reviewed the facility/SNF care plan/MDS, including the falls risk, nutrition and pain screening. I also reviewed the current immunizations, and preventive care.. Cancer screenings up-to-date at this time Patient's desire to return to the community is not assessible due to cognitive impairment. Current Level of Care is appropriate.    Advance Directive Discussion:    I reviewed the current advanced directives as reflected in EPIC, the POLST and the facility chart, and verified the congruency of orders. The first party daughter, recently contact and discussed the plan of Care.  I did not due to cognitive impairment review the advance directives with the resident. Patient's goal is family's/responsible party's goal for the patient is to improve her health and find out what is going on.    Team Discussion:  I communicated with the appropriate disciplines involved with the Plan of Care:   Nursing  ,    and Dietitian    Information  reviewed:  Medications, vital signs, orders, and nursing notes.    ROS:  Unobtainable secondary to cognitive impairment.     Vitals:  /63   Pulse 77   Temp 97.5  F (36.4  C)   Wt 65.9 kg (145 lb 3.2 oz)   BMI 25.72 kg/m   Body mass index is 25.72 kg/m .  Exam:  GENERAL APPEARANCE:  Alert, in no distress, appears healthy, somnolent, cooperative, elderly female upright in wheelchair  EYES:  EOM, conjunctivae, lids, pupils and irises normal, wears glasses  RESP:  respiratory effort and palpation of chest normal, lungs clear to auscultation , no respiratory distress  CV:  Palpation and auscultation of heart done , regular rate and rhythm, no murmur, rub, or gallop, +2 pedal pulses  ABDOMEN:  normal bowel sounds, soft, nontender, no hepatosplenomegaly or other masses  M/S:   Gait and station abnormal - Non-ambulatory at baseline  Digits and nails abnormal - arthritic changes present  SKIN:  Inspection of skin and subcutaneous tissue baseline, Palpation of skin and subcutaneous tissue baseline, wound appearance: ROXANN 2/2 patient being in wheelchair  NEURO:   Cranial nerves 2-12 are normal tested and grossly at patient's baseline  PSYCH:  oriented to 1-2, insight and judgement impaired, memory impaired , affect and mood normal     Lab/Diagnostic data:   Recent labs in [facility] reviewed by me today.     ASSESSMENT/PLAN    ICD-10-CM    1. Polyneuropathy associated with underlying disease (H)  G63 Chronic - stable. No s/sx of acute pain today. Continue Cymbalta as ordered with ongoing monitoring.      2. Type 2 diabetes mellitus with other specified complication, with long-term current use of insulin (H)  E11.69 Chronic - variable. Recent A1c <8.0 as shown below. Recent BG levels as noted. Continue current regimen of Metformin and insulin as ordered with ongoing monitoring of A1c and BG.  Lab Results   Component Value Date    A1C 7.6 01/03/2023    A1C 6.9 08/17/2022    A1C 8.1 06/17/2021    A1C 6.5 11/27/2020    A1C 6.9  03/18/2020    A1C 6.6 07/23/2019    A1C 7.5 10/24/2016    A1C 7.5 10/12/2015    A1C 8.1 03/06/2014    A1C 7.2 03/07/2013       Z79.4       3. Stage 3 chronic kidney disease, unspecified whether stage 3a or 3b CKD (H)  N18.30 Chronic - unstable, tenuous. Kidney status variable with patient's decreasing PO intake. Recent BMP noted - ongoing monitoring of BMP and avoidance of nephrotoxic agents      4. Moderate dementia without behavioral disturbance, psychotic disturbance, mood disturbance, or anxiety, unspecified dementia type  F03.B0 Chronic - unstable, progressive. Patient appears to be progressing within this diagnosis overall - agree likely due to many confounding factors. No active tx regimen at this time. Monitor.    5. Encephalopathy  G93.40       6. Dysphagia, unspecified type  R13.10 Chronic - unstable. Patient with weight loss as   7. Weight loss  R63.4 Noted per HPI with recent weight as noted below. Ongoing management per in-house dietician with interventions as needed.         8. Chronic pain syndrome  G89.4 Chronic - variable. No reports of pain concerns today. Continue Tylenol and Cymbalta as ordered with ongoing monitoring.      9. Benign hypertensive kidney disease with chronic kidney disease stage I through stage IV, or unspecified  I12.9 Chronic - stable. Recent BPs as shown below. Continue regimen of Metoprolol and Chlorithalidone as ordered with ongoing monitoring of VS.        10. Hyperlipidemia, unspecified hyperlipidemia type  E78.5 Chronic - variable. Last FLP as noted below - continue regimen of Lipitor as ordered with ongoing monitoring of FLP.  Recent Labs   Lab Test 11/18/21  0544 11/27/20  0456   CHOL 92 103   HDL 26* 32*   LDL 30 43   TRIG 180* 138             Electronically signed by:  Dr. Morelia Allen, APRN, DNP, AGNP-BC, PMHNP-BC  [facility]  Office Hours: Tues-Fri 5523-1805  Office: [address] [place] [address]  Fax - [phone]  Triage Phone -  [phone]  [facility] Office- [phone]      Email: [email or web address]                   Sincerely,        LAMONT Aguilar CNP

## 2023-01-20 NOTE — CONFIDENTIAL NOTE
Patient with increasing c/o pain with wounds; only on scheduled Tylenol and Cymbalta    Orders:    Will start Tramadol 25mg PO BID and 25mg PO BID PRN pain - may give PRN with scheduled dosing if scheduled dose is ineffective    Dr. Morelia Allen, APRN, DNP, AGNP-BC, PMHNP-BC  [facility]  Office Hours: Tues-Fri 7797-7756  Office: [address] [place] [address]  Fax - [phone]  Triage Phone- [phone]  Business Office- [phone]      Email: [email or web address]

## 2023-02-01 PROBLEM — N17.9 ACUTE KIDNEY INJURY (H): Status: ACTIVE | Noted: 2023-01-01

## 2023-02-01 PROBLEM — R62.7 FAILURE TO THRIVE IN ADULT: Status: ACTIVE | Noted: 2023-01-01

## 2023-02-01 NOTE — ED NOTES
Patient does have pressure wound on coccyx. Wound is odorous with moderate drainage. Wound bed is very deep but unable to view depth.

## 2023-02-01 NOTE — ED NOTES
Per daughter Valerie, patient has endoscopy scheduled on March 1st. Daughter Valerie states they just had a care conference at the facility, she is unsure of why patient was sent to ER.

## 2023-02-01 NOTE — H&P
[facility]    History and Physical  Hospitalist       Date of Admission:  2/1/2023    Assessment & Plan   72 year old female with past medical hx of DM type 2 and HTN, who presents from care center per request of her daughter to be assessed for failure to thrive.     Summary:    Principal Problem:    Failure to thrive in adult      ALY -- suspect related to dehydration      Hypertension      DM type 2 w Neuro/Retin/Nephr -- hgb A1C 7.6 on 1/3/23      Sacral decubitus ulcer      Mild hypercalcemia -- 2nd to dehydration       Plan: daughter not present to discuss her mother's care.  The patient does not want to be here - wants to go back to her nursing home, and is refusing to have an IV placed.  Will have wound care nurse assess sacral ulcer and have gen surgery look whether it may be best to debride it.      DVT Prophylaxis: Pneumatic Compression Devices  Code Status: DNR / DNI    Disposition: Expected discharge back to nursing home in 1-2 days (back to [facility].    Vu Coleman MD  Pager: [phone]  Cell Phone:  [phone]     Primary Care Physician   Aditi Allen    Chief Complaint   Decreased appetite, losing weight    History is obtained from Patient    History of Present Illness    72 year old female with a pertinent history of type 2 diabetes mellitus, diabetic neuropathy, CAD, CVA, hypertension, hyperlipidemia, CKD, stroke, who presents to this ED EMS for evaluation of weight loss and tailbone pain with skin breakdown.      Patient reports increased significant weight loss recently. She states she lost about 20 lbs within the past 2-3 months. Her providers have been giving her nutrition supplements and protein drinks but she states she can't finish it all. She denies pain with swallowing and denies coughing or choking while eating or drinking. She also denies abdominal pain, nausea, and change in bowel habits.  She also endorses tailbone pain. The  ulcer is deeper and more foul smelling today. She has rook boots on her bilateral legs. She hasn't seen wound care for the past 3 months. She hasn't walked for 5 years.     In ER, AVSS, BUN 35, Creat 1.09 (Creat 0.59 on 23), glucose 169, Alk Phos 223, AST 56, Calcium 10.3. WBC 6.6 with hgb 12.3.      PAST MEDICAL HISTORY    Past Medical History:   Diagnosis Date     AION (anterior ischaemic optic neuropathy), left eye     NAION LE     CAD (coronary artery disease) 3/2009    [facility]; Angio  UM- normal coronary arteries     Cataract      CVA (cerebral vascular accident) (H)     admitted at [facility]     on Cymbalta     Diabetes mellitus, type 2 (H)      Diabetic nephropathy (H)      Diabetic neuropathy (H)     severe     Diabetic retinopathy (H)      GERD (gastroesophageal reflux disease)      Hyperlipidemia      Hypertension     ECHO , TDS, NL EF     Infection due to  novel coronavirus 2023     POAG (primary open-angle glaucoma)     adv BE     Seasonal allergies      Tubular adenoma of colon 2013    repeat colonoscopy in 2018        PAST SURGICAL HISTORY    Past Surgical History:   Procedure Laterality Date     CARDIAC CATHETERIZATION       CATARACT EXTRACTION W/ INTRAOCULAR LENS IMPLANT Bilateral       SECTION        SECTION       COLONOSCOPY  7/15/2013    Tubular adenoma; repeat in 2018;Procedure: COMBINED COLONOSCOPY, SINGLE BIOPSY/POLYPECTOMY BY BIOPSY;;  Surgeon: Don King MD;  Tubular adenoma     COLONOSCOPY N/A 2020    Procedure: COLONOSCOPY;  Surgeon: Sid Amanda MD;  Location: [facility]     CORONARY STENT PLACEMENT  2004    RCA     DAVINCI HYSTERECTOMY TOTAL, BILATERAL SALPINGO-OOPHORECTOMY, COMBINED N/A 2019    Procedure: DaVinci Assisted Total Laparoscopic Hysterectomy, Removal Of Both Tubes And Ovaries;  Surgeon: Linh Cardoso MD;  Location: [facility]     EXTRACAPSULAR CATARACT EXTRATION WITH INTRAOCULAR LENS  IMPLANT  11-10-09, 2-9-10    11-10-09 Lt, 2-9-10 Rt; Left eye 8/2012     HYSTERECTOMY TOTAL ABDOMINAL, BILATERAL SALPINGO-OOPHORECTOMY, COMBINED  08/05/2019    Procedure: DaVinci Assisted Total Laparoscopic Hysterectomy, Removal Of Both Tubes And Ovaries; Surgeon: Linh Cardoso MD; Location: [facility] OR       STENT, CORONARY, DELORES  2009    RCA        Prior to Admission Medications   Prior to Admission Medications   Prescriptions Last Dose Informant Patient Reported? Taking?   ASPIRIN LOW DOSE 81 MG chewable tablet 2/1/2023  Yes Yes   Sig: Take 81 mg by mouth daily   DULoxetine (CYMBALTA) 30 MG capsule 2/1/2023  Yes Yes   Sig: Take 90 mg by mouth every morning    White Petrolatum-Mineral Oil (REFRESH P.M.) OINT Unknown  Yes Yes   Sig: Apply to eye nightly as needed   acetaminophen (TYLENOL) 500 MG tablet 2/1/2023  Yes Yes   Sig: Take 1,000 mg by mouth 3 times daily   atorvastatin (LIPITOR) 80 MG tablet 2/1/2023  No Yes   Sig: Take 1 tablet by mouth daily. Pt needs to have labs done prior to further refills.   bisacodyl (DULCOLAX) 10 MG suppository Unknown  Yes Yes   Sig: Place 10 mg rectally daily as needed for constipation   chlorthalidone (HYGROTON) 25 MG tablet   Yes No   Sig: Take 25 mg by mouth every morning    diclofenac (VOLTAREN) 1 % topical gel 2/1/2023  Yes Yes   Sig: Apply 2 g topically 4 times daily   dorzolamide-timolol (COSOPT) 2-0.5 % ophthalmic solution 2/1/2023  No Yes   Sig: Place 1 drop into both eyes 2 times daily   hydrocortisone, Perianal, (HYDROCORTISONE) 2.5 % cream 2/1/2023  No Yes   Sig: Place rectally 2 times daily as needed for hemorrhoids   Patient taking differently: Place rectally 2 times daily For hemorrhoids   insulin glargine (LANTUS PEN) 100 UNIT/ML pen 2/1/2023  No Yes   Sig: Inject 20 Units Subcutaneous every morning (before breakfast)   insulin lispro (HUMALOG KWIKPEN) 100 UNIT/ML (1 unit dial) KWIKPEN 2/1/2023  Yes Yes   Sig: Inject Subcutaneous 3 times daily (before meals)  140-189=1 unit  190-239= 2 units  240-289= 3 units  290-339= 4 units  340-399= 5 units  400-999= 6 units   insulin lispro (HUMALOG KWIKPEN) 100 UNIT/ML (1 unit dial) KWIKPEN 1/31/2023  Yes Yes   Sig: Inject Subcutaneous At Bedtime 200-249=1 unit  250-299= 2 units  300-349= 3 units  350-399= 4 units  400-999= 5units   ketoconazole (NIZORAL) 2 % shampoo 1/31/2023  No Yes   Sig: To entire wet scalp and ears and then wash off after 5 minutes three times a week.   Patient taking differently: To entire wet scalp and ears and then wash off after 5 minutes Tuesday and Friday   latanoprost (XALATAN) 0.005 % ophthalmic solution 1/31/2023  No Yes   Sig: Place 1 drop into both eyes At Bedtime   liraglutide (VICTOZA PEN) 18 MG/3ML solution 2/1/2023  No Yes   Sig: Inject subcu 1.2mg daily.   loperamide (IMODIUM A-D) 2 MG tablet   Yes No   Sig: Take 2 mg by mouth 4 times daily as needed for diarrhea   loratadine (CLARITIN) 10 MG tablet Unknown  Yes Yes   Sig: Take 10 mg by mouth as needed.   melatonin 3 MG tablet 1/31/2023  Yes Yes   Sig: Take 3 mg by mouth At Bedtime   metFORMIN (GLUCOPHAGE-XR) 500 MG 24 hr tablet 1/31/2023  No Yes   Sig: Take 2 tablets (1,000 mg) by mouth daily (with dinner)   metoprolol tartrate (LOPRESSOR) 100 MG tablet 2/1/2023  Yes Yes   Sig: Take 100 mg by mouth 2 times daily   nitroGLYCERIN (NITROSTAT) 0.4 MG SL tablet Unknown  Yes Yes   Sig: Place 0.4 mg under the tongue every 5 minutes as needed.   olopatadine (PATADAY) 0.2 % ophthalmic solution Unknown  No Yes   Sig: Place 1 drop into both eyes daily For itchy eyes   omeprazole (PRILOSEC) 20 MG DR capsule 2/1/2023  Yes Yes   Sig: Take 20 mg by mouth daily   polyvinyl alcohol (LIQUIFILM TEARS) 1.4 % ophthalmic solution 2/1/2023  Yes Yes   Sig: Place 1 drop into both eyes 3 times daily   senna-docusate (SENOKOT-S/PERICOLACE) 8.6-50 MG tablet Unknown  No Yes   Sig: Take 2 tablets by mouth 2 times daily as needed for constipation   simethicone (MYLICON) 125  MG chewable tablet Unknown  Yes Yes   Sig: Take 125 mg by mouth 4 times daily as needed After meals and HS prn   traMADol (ULTRAM) 50 MG tablet 2023 at AM  No Yes   Sig: Take 0.5 tablets (25 mg) by mouth 2 times daily. May also take 0.5 tablets (25 mg) 2 times daily as needed for severe pain (7-10). May give together if scheduled 25mg is ineffective      Facility-Administered Medications: None     Allergies   Allergies   Allergen Reactions     Dust Mites Other (See Comments)     Sneezing runny eyes and nose.     Food Allergy Formula Hives     Mountain Dew and Walnuts     Pollen Extract Other (See Comments)     Sneezing runny eyes and nose.       SOCIAL HISTORY    Social History     Social History Narrative    Pt has three daughters and two sons, single.  Her daughter tigre Yun's her often at the Nursing Home ([facility]).  Moved into Nursing Home in 2011 after a hospitalization for ALY.  Moved from [place] in  to [facility]. Has 5 grandchildren. Has living will, desires DNR/DNI.  (last updated 2023)       Social History     Tobacco Use     Smoking status: Former     Packs/day: 1.50     Years: 45.00     Pack years: 67.50     Types: Cigarettes     Start date: 1968     Quit date: 3/6/2013     Years since quittin.9     Smokeless tobacco: Never   Vaping Use     Vaping Use: Never used   Substance Use Topics     Alcohol use: Not Currently     Drug use: Never        FAMILY HISTORY    Family History   Problem Relation Age of Onset     Hypertension Mother      Cerebrovascular Disease Mother      Glaucoma Father      Cancer Father      Diabetes Sister      Glaucoma Sister      Cancer - colorectal Other      Cerebrovascular Disease Other      Skin Cancer No family hx of      Melanoma No family hx of      Anesthesia Reaction No family hx of      Deep Vein Thrombosis (DVT) No family hx of      Arthritis Brother      Diabetes Sister      Glaucoma Sister         Review of Systems   The 10 point  Review of Systems is negative other than noted in the HPI or here.       PHYSICAL EXAM     Temp: 97.4  F (36.3  C) Temp src: Axillary BP: 118/64 Pulse: 80   Resp: 18 SpO2: 100 % O2 Device: None (Room air)    Vital Signs with Ranges  Temp:  [97.4  F (36.3  C)] 97.4  F (36.3  C)  Pulse:  [70-81] 80  Resp:  [18] 18  BP: ()/(46-78) 118/64  SpO2:  [98 %-100 %] 100 %  0 lbs 0 oz    Constitutional: Awake, alert, cooperative, no apparent distress.  Eyes: Conjunctiva and pupils examined and normal.  HEENT: Moist mucous membranes, normal dentition.  Respiratory: Clear to auscultation bilaterally, no crackles or wheezing.  Cardiovascular: Regular rate and rhythm, normal S1 and S2, and no murmur noted, no carotid bruits.  No ankle edema.   GI: Soft, non-distended, non-tender, normal bowel sounds.  Lymph/Hematologic: No anterior cervical, supraclavicular or axillary adenopathy.  Skin: No rashes, no cyanosis.   Musculoskeletal: No joint swelling, erythema or tenderness.  Neurologic: Alert, Ox2.5 (doesn't know year, but knows its January and Jamal Demarco is president and is at [facility]), Cranial nerves 2-12 intact, no focal weakness or numbness, but generalized weakness and unable to stand, and too weak to sit up in bed  Has Sacral wound with dressing but foul odor present.   Psychiatric:  No obvious anxiety or depression.    Data   Data reviewed today:  I personally reviewed no images or EKG's today.  Recent Labs   Lab 02/01/23  1254   WBC 6.6   HGB 12.3   MCV 98         POTASSIUM 4.3   CHLORIDE 104   CO2 23   BUN 34.8*   CR 1.06*   ANIONGAP 12   OLAF 10.3*   *   ALBUMIN 3.6   PROTTOTAL 8.0   BILITOTAL 0.4   ALKPHOS 223*   ALT 31   AST 56*       Imaging:  No results found for this or any previous visit (from the past 24 hour(s)).

## 2023-02-01 NOTE — ED NOTES
Attempted IV on patient, patient pulled away and started screaming take it out take it out.  Unable to maintain the IV.

## 2023-02-01 NOTE — ED NOTES
Patient states she does not want to stay here. Patient refused IV and fluids. MD paged as to patient concerns. Still waiting for a reply.

## 2023-02-01 NOTE — PHARMACY-ADMISSION MEDICATION HISTORY
Pharmacy Note - Admission Medication History    Pertinent Provider Information:      ______________________________________________________________________    Prior To Admission (PTA) med list completed and updated in EMR.       PTA Med List   Medication Sig Last Dose     acetaminophen (TYLENOL) 500 MG tablet Take 1,000 mg by mouth 3 times daily 2/1/2023     ASPIRIN LOW DOSE 81 MG chewable tablet Take 81 mg by mouth daily 2/1/2023     atorvastatin (LIPITOR) 80 MG tablet Take 1 tablet by mouth daily. Pt needs to have labs done prior to further refills. 2/1/2023     bisacodyl (DULCOLAX) 10 MG suppository Place 10 mg rectally daily as needed for constipation Unknown     diclofenac (VOLTAREN) 1 % topical gel Apply 2 g topically 4 times daily 2/1/2023     dorzolamide-timolol (COSOPT) 2-0.5 % ophthalmic solution Place 1 drop into both eyes 2 times daily 2/1/2023     DULoxetine (CYMBALTA) 30 MG capsule Take 90 mg by mouth every morning  2/1/2023     hydrocortisone, Perianal, (HYDROCORTISONE) 2.5 % cream Place rectally 2 times daily as needed for hemorrhoids (Patient taking differently: Place rectally 2 times daily For hemorrhoids) 2/1/2023     insulin glargine (LANTUS PEN) 100 UNIT/ML pen Inject 20 Units Subcutaneous every morning (before breakfast) 2/1/2023     insulin lispro (HUMALOG KWIKPEN) 100 UNIT/ML (1 unit dial) KWIKPEN Inject Subcutaneous 3 times daily (before meals) 140-189=1 unit  190-239= 2 units  240-289= 3 units  290-339= 4 units  340-399= 5 units  400-999= 6 units 2/1/2023     insulin lispro (HUMALOG KWIKPEN) 100 UNIT/ML (1 unit dial) KWIKPEN Inject Subcutaneous At Bedtime 200-249=1 unit  250-299= 2 units  300-349= 3 units  350-399= 4 units  400-999= 5units 1/31/2023     ketoconazole (NIZORAL) 2 % shampoo To entire wet scalp and ears and then wash off after 5 minutes three times a week. (Patient taking differently: To entire wet scalp and ears and then wash off after 5 minutes Tuesday and Friday) 1/31/2023      latanoprost (XALATAN) 0.005 % ophthalmic solution Place 1 drop into both eyes At Bedtime 1/31/2023     liraglutide (VICTOZA PEN) 18 MG/3ML solution Inject subcu 1.2mg daily. 2/1/2023     loratadine (CLARITIN) 10 MG tablet Take 10 mg by mouth as needed. Unknown     melatonin 3 MG tablet Take 3 mg by mouth At Bedtime 1/31/2023     metFORMIN (GLUCOPHAGE-XR) 500 MG 24 hr tablet Take 2 tablets (1,000 mg) by mouth daily (with dinner) 1/31/2023     metoprolol tartrate (LOPRESSOR) 100 MG tablet Take 100 mg by mouth 2 times daily 2/1/2023     nitroGLYCERIN (NITROSTAT) 0.4 MG SL tablet Place 0.4 mg under the tongue every 5 minutes as needed. Unknown     olopatadine (PATADAY) 0.2 % ophthalmic solution Place 1 drop into both eyes daily For itchy eyes Unknown     omeprazole (PRILOSEC) 20 MG DR capsule Take 20 mg by mouth daily 2/1/2023     polyvinyl alcohol (LIQUIFILM TEARS) 1.4 % ophthalmic solution Place 1 drop into both eyes 3 times daily 2/1/2023     senna-docusate (SENOKOT-S/PERICOLACE) 8.6-50 MG tablet Take 2 tablets by mouth 2 times daily as needed for constipation Unknown     simethicone (MYLICON) 125 MG chewable tablet Take 125 mg by mouth 4 times daily as needed After meals and HS prn Unknown     traMADol (ULTRAM) 50 MG tablet Take 0.5 tablets (25 mg) by mouth 2 times daily. May also take 0.5 tablets (25 mg) 2 times daily as needed for severe pain (7-10). May give together if scheduled 25mg is ineffective 2/1/2023 at AM     White Petrolatum-Mineral Oil (REFRESH P.M.) OINT Apply to eye nightly as needed Unknown       Information source(s): Facility ([facility]/) medication list/MAR  Method of interview communication: N/A    Summary of Changes to PTA Med List  New: melatonin  Discontinued: none  Changed: none    Patient was asked about OTC/herbal products specifically.  PTA med list reflects this.    In the past week, patient estimated taking medication this percent of the time:  greater than 90%.    Medication  Affordability:       Allergies were reviewed, assessed, and updated with the patient.      Patient did not bring any medications to the hospital and can't retrieve from home. No multi-dose medications are available for use during hospital stay.     The information provided in this note is only as accurate as the sources available at the time of the update(s).    Thank you for the opportunity to participate in the care of this patient.    Sejal Encarnacion [facility]  2/1/2023 1:28 PM

## 2023-02-01 NOTE — ED NOTES
Expected Patient Referral to ED  11:05 AM    Referring Clinic/Provider:  [facility]    Reason for referral/Clinical facts:  71y/o female with significant weight loss and poor oral intake.      Recommendations provided:  Send to ED for further evaluation    Caller was informed that this institution does possess the capabilities and/or resources to provide for patient and should be transferred to our facility.    Discussed that if direct admit is sought and any hurdles are encountered, this ED would be happy to see the patient and evaluate.    Informed caller that recommendations provided are recommendations based only on the facts provided and that they responsible to accept or reject the advice, or to seek a formal in person consultation as needed and that this ED will see/treat patient should they arrive.      KAREN LR DO  [facility] EMERGENCY DEPARTMENT  [facility] [facility] [id number]  [phone]       Karen Lr DO  02/01/23 1100

## 2023-02-01 NOTE — PROGRESS NOTES
[facility] Care Coordination Contact    [facility]  Ambulatory Care Coordination to Inpatient Care Management   Hand-In Communication    Date:  February 1, 2023  Name: Cristal Preciado is enrolled in [facility] Care Coordination program and I am the Lead Care Coordinator.  CC Contact Information:.   Payor Source: Payor: [facility] / Plan: [facility] DUAL / Product Type: HMO /   Current services in place:     Please see the CC Snaphot and Care Management Flowsheets for specific details of this Cristal Preciado care plan.     I will follow this admission in Epic. Please feel free to contact me with questions or for further collaboration in discharge planning.    LOR Douglas, [facility]  [phone]

## 2023-02-01 NOTE — ED TRIAGE NOTES
Patient arrives from [facility] via ems for c/o weight loss. Family states patient was due for endoscopy last month but had covid and was not able to reschedule it. Family wants endoscopy done today to determine why patient is losing weight. Patient also has pressure ulcers on bilateral lower extremities and buttocks which cause a great deal of pain. Family also looking for pain control for wounds.

## 2023-02-01 NOTE — ED NOTES
Pt refusing care except reappointing and warm blankets. States, she wants to leave. Provider text paged.

## 2023-02-01 NOTE — ED PROVIDER NOTES
EMERGENCY DEPARTMENT ENCOUNTER      NAME: Cristal Preciado  AGE: 72 year old female  YOB: 1950  MRN: [id number]  EVALUATION DATE & TIME: 2/1/2023 11:17 AM    PCP: Aditi Allen    ED PROVIDER: Keny Santos MD        Chief Complaint   Patient presents with     Weight Loss     Tailbone Pain         FINAL IMPRESSION:  1. Other fatigue    2. Pressure injury of deep tissue of sacral region          ED COURSE & MEDICAL DECISION MAKING:    Pertinent Labs & Imaging studies reviewed. (See chart for details)  72 year old female presents to the Emergency Department for evaluation of generalized weakness, sacral wound worsening, and decreased appetite.      Brought in by ambulance.  Daughter Court states patient needs endoscopy and she does not consent to her going back to her care facility until endoscopy is done    Patient denies any pain with eating, choking or gagging with eating.  Abdomen soft nontender.    Generalized weakness and dehydration on exam.    I viewed patient's decubitus ulcer which appears to be worsening.  Is malodorous.    COVID-19 patient's daughter.  Reviewed expected note.    Had COVID in December.  Labs show elevated creatinine and calcium consistent with dehydration.  Given 500 cc IV fluid    White count normal      Spoke with hospitalist plan for admission for observation       12:30 PM I met with the patient to obtain patient history and performed a physical exam. Discussed plan for ED work up including potential diagnostic studies and interventions. PPE: N95, goggles  12:37 PM Called patient's daughter, Court. She refuses to discharge patient until endoscopy is done.  2:02 PM Spoke with hospitalist Dr. Coleman who accepts patient for admission.      Medical Decision Making    History:    Supplemental history from: Documented in chart, if applicable    External Record(s) reviewed: Inpatient Record: Reviewed patient's recent discharge summary.    Work Up:    Chart documentation  "includes differential considered and any EKGs or imaging independently interpreted by provider, where specified.    In additional to work up documented, I considered the following work up: Documented in chart, if applicable.    External consultation:    Discussion of management with another provider: Hospitalist    Complicating factors:    Care impacted by chronic illness: Chronic Kidney Disease, Diabetes, Heart Disease, Hyperlipidemia and Hypertension    Care affected by social determinants of health: N/A    Disposition considerations: Admit.            At the conclusion of the encounter I discussed the results of all of the tests and the disposition. The questions were answered. The patient or family acknowledged understanding and was agreeable with the care plan.           MEDICATIONS GIVEN IN THE EMERGENCY:  Medications   0.9% sodium chloride BOLUS (has no administration in time range)       NEW PRESCRIPTIONS STARTED AT TODAY'S ER VISIT  New Prescriptions    No medications on file          =================================================================    HPI    Triage note  \"Patient arrives from [facility] via ems for c/o weight loss. Family states patient was due for endoscopy last month but had covid and was not able to reschedule it. Family wants endoscopy done today to determine why patient is losing weight. Patient also has pressure ulcers on bilateral lower extremities and buttocks which cause a great deal of pain. Family also looking for pain control for wounds.      \"      Patient information was obtained from: patient    Use of : N/A         Cristal Preciado is a 72 year old female with a pertinent history of type 2 diabetes mellitus, diabetic neuropathy, CAD, CVA, hypertension, hyperlipidemia, CKD, stroke, who presents to this ED EMS for evaluation of weight loss and tailbone pain.    Patient reports increased significant weight loss recently. She states she lost about 200 lbs " within the past month. Her providers have been giving her nutrition supplements and protein drinks but she states she can't finish it all. She denies pain with swallowing and denies coughing or choking while eating or drinking. She also denies abdominal pain, nausea, and change in bowel habits.    She also endorses tailbone pain. The ulcer is deeper and more foul smelling today. She has vascular boots on her bilateral legs. She hasn't seen wound care for the past 3 months. She doesn't walk at baseline due to previous stroke. There were no other concerns/complaints at this time.      REVIEW OF SYSTEMS   Review of Systems   Constitutional: Positive for unexpected weight change (weight loss).   HENT: Negative for trouble swallowing.    Respiratory: Negative for cough and choking.    Gastrointestinal: Negative for abdominal pain, constipation, diarrhea and nausea.   Skin: Positive for wound (ulcer on tailbone).       PAST MEDICAL HISTORY:  Past Medical History:   Diagnosis Date     AION (anterior ischaemic optic neuropathy), left eye     NAION LE     CAD (coronary artery disease) 3/2009    [facility]; Angio  UM- normal coronary arteries     Cataract      CVA (cerebral vascular accident) (H)     admitted at [facility]     on Cymbalta     Diabetes mellitus, type 2 (H)      Diabetic nephropathy (H)      Diabetic neuropathy (H)     severe     Diabetic retinopathy (H)      GERD (gastroesophageal reflux disease)      Hyperlipidemia      Hypertension     ECHO 2013, TDS, NL EF     Infection due to 2019 novel coronavirus 2023     POAG (primary open-angle glaucoma)     adv BE     Seasonal allergies      Tubular adenoma of colon 2013    repeat colonoscopy in 2018       PAST SURGICAL HISTORY:  Past Surgical History:   Procedure Laterality Date     CARDIAC CATHETERIZATION       CATARACT EXTRACTION W/ INTRAOCULAR LENS IMPLANT Bilateral       SECTION        SECTION       COLONOSCOPY   7/15/2013    Tubular adenoma; repeat in 2018;Procedure: COMBINED COLONOSCOPY, SINGLE BIOPSY/POLYPECTOMY BY BIOPSY;;  Surgeon: Don King MD;  Tubular adenoma     COLONOSCOPY N/A 1/24/2020    Procedure: COLONOSCOPY;  Surgeon: Sid Amanda MD;  Location: [facility] GI     CORONARY STENT PLACEMENT  03/19/2004    RCA     DAVINCI HYSTERECTOMY TOTAL, BILATERAL SALPINGO-OOPHORECTOMY, COMBINED N/A 8/5/2019    Procedure: DaVinci Assisted Total Laparoscopic Hysterectomy, Removal Of Both Tubes And Ovaries;  Surgeon: Linh Cardoso MD;  Location: [facility] OR     EXTRACAPSULAR CATARACT EXTRATION WITH INTRAOCULAR LENS IMPLANT  11-10-09, 2-9-10    11-10-09 Lt, 2-9-10 Rt; Left eye 8/2012     HYSTERECTOMY TOTAL ABDOMINAL, BILATERAL SALPINGO-OOPHORECTOMY, COMBINED  08/05/2019    Procedure: DaVinci Assisted Total Laparoscopic Hysterectomy, Removal Of Both Tubes And Ovaries; Surgeon: Linh Cardoso MD; Location: [facility] OR       STENT, CORONARY, DELORES  2009    RCA           CURRENT MEDICATIONS:    acetaminophen (TYLENOL) 500 MG tablet  ASPIRIN LOW DOSE 81 MG chewable tablet  atorvastatin (LIPITOR) 80 MG tablet  bisacodyl (DULCOLAX) 10 MG suppository  diclofenac (VOLTAREN) 1 % topical gel  dorzolamide-timolol (COSOPT) 2-0.5 % ophthalmic solution  DULoxetine (CYMBALTA) 30 MG capsule  hydrocortisone, Perianal, (HYDROCORTISONE) 2.5 % cream  insulin glargine (LANTUS PEN) 100 UNIT/ML pen  insulin lispro (HUMALOG KWIKPEN) 100 UNIT/ML (1 unit dial) KWIKPEN  insulin lispro (HUMALOG KWIKPEN) 100 UNIT/ML (1 unit dial) KWIKPEN  ketoconazole (NIZORAL) 2 % shampoo  latanoprost (XALATAN) 0.005 % ophthalmic solution  liraglutide (VICTOZA PEN) 18 MG/3ML solution  loratadine (CLARITIN) 10 MG tablet  melatonin 3 MG tablet  metFORMIN (GLUCOPHAGE-XR) 500 MG 24 hr tablet  metoprolol tartrate (LOPRESSOR) 100 MG tablet  nitroGLYCERIN (NITROSTAT) 0.4 MG SL tablet  olopatadine (PATADAY) 0.2 % ophthalmic solution  omeprazole (PRILOSEC) 20 MG DR  capsule  polyvinyl alcohol (LIQUIFILM TEARS) 1.4 % ophthalmic solution  senna-docusate (SENOKOT-S/PERICOLACE) 8.6-50 MG tablet  simethicone (MYLICON) 125 MG chewable tablet  traMADol (ULTRAM) 50 MG tablet  White Petrolatum-Mineral Oil (REFRESH P.M.) OINT  chlorthalidone (HYGROTON) 25 MG tablet  loperamide (IMODIUM A-D) 2 MG tablet        ALLERGIES:  Allergies   Allergen Reactions     Dust Mites Other (See Comments)     Sneezing runny eyes and nose.     Food Allergy Formula Hives     Mountain Dew and Walnuts     Pollen Extract Other (See Comments)     Sneezing runny eyes and nose.       FAMILY HISTORY:  Family History   Problem Relation Age of Onset     Hypertension Mother      Cerebrovascular Disease Mother      Glaucoma Father      Cancer Father      Diabetes Sister      Glaucoma Sister      Cancer - colorectal Other      Cerebrovascular Disease Other      Skin Cancer No family hx of      Melanoma No family hx of      Anesthesia Reaction No family hx of      Deep Vein Thrombosis (DVT) No family hx of      Arthritis Brother      Diabetes Sister      Glaucoma Sister        SOCIAL HISTORY:   Social History     Socioeconomic History     Marital status: Single     Number of children: 5   Tobacco Use     Smoking status: Former     Packs/day: 1.50     Years: 45.00     Pack years: 67.50     Types: Cigarettes     Start date: 1968     Quit date: 3/6/2013     Years since quittin.9     Smokeless tobacco: Never   Vaping Use     Vaping Use: Never used   Substance and Sexual Activity     Alcohol use: Not Currently     Drug use: Never     Sexual activity: Not Currently   Social History Narrative    Pt has three daughters and two sons, single.  Her daughter Court see's her often at the Nursing Home ([facility]).  Moved into Nursing Home in 2011 after a hospitalization for ALY.  Moved from [place] in  to [facility]. Has 5 grandchildren.       VITALS:  BP (!) 141/70   Pulse 70   Temp 97.4  F (36.3  C)  "(Axillary)   Resp 18   Ht 1.702 m (5' 7\")   SpO2 100%   BMI 23.85 kg/m      PHYSICAL EXAM      Vitals: BP (!) 141/70   Pulse 70   Temp 97.4  F (36.3  C) (Axillary)   Resp 18   Ht 1.702 m (5' 7\")   SpO2 100%   BMI 23.85 kg/m    General: Appears in no acute distress, awake, alert, interactive.  Eyes: Conjunctivae non-injected. Sclera anicteric.  HENT: Atraumatic.  Dry mucous membranes  Neck: Supple.  Respiratory/Chest: Respiration unlabored.  Abdomen: non distended, abdominal tenderness  Musculoskeletal: Vascular boots on bilateral legs.  Skin: Normal color. No rash or diaphoresis. Deep ulcerated ulcer to sacrum that appears deeper compared to compared to previous note from last hospitalization. Foul smelling.  Neurologic: Soft speech  Psychiatric: Oriented to person, place, and time. Affect appropriate.       LAB:  All pertinent labs reviewed and interpreted.  Results for orders placed or performed during the hospital encounter of 02/01/23   CBC (+ platelets, no diff)   Result Value Ref Range    WBC Count 6.6 4.0 - 11.0 10e3/uL    RBC Count 4.06 3.80 - 5.20 10e6/uL    Hemoglobin 12.3 11.7 - 15.7 g/dL    Hematocrit 39.6 35.0 - 47.0 %    MCV 98 78 - 100 fL    MCH 30.3 26.5 - 33.0 pg    MCHC 31.1 (L) 31.5 - 36.5 g/dL    RDW 14.0 10.0 - 15.0 %    Platelet Count 260 150 - 450 10e3/uL   Hepatic function panel   Result Value Ref Range    Protein Total 8.0 6.4 - 8.3 g/dL    Albumin 3.6 3.5 - 5.2 g/dL    Bilirubin Total 0.4 <=1.2 mg/dL    Alkaline Phosphatase 223 (H) 35 - 104 U/L    AST 56 (H) 10 - 35 U/L    ALT 31 10 - 35 U/L    Bilirubin Direct 0.20 0.00 - 0.30 mg/dL   Basic metabolic panel   Result Value Ref Range    Sodium 139 136 - 145 mmol/L    Potassium 4.3 3.4 - 5.3 mmol/L    Chloride 104 98 - 107 mmol/L    Carbon Dioxide (CO2) 23 22 - 29 mmol/L    Anion Gap 12 7 - 15 mmol/L    Urea Nitrogen 34.8 (H) 8.0 - 23.0 mg/dL    Creatinine 1.06 (H) 0.51 - 0.95 mg/dL    Calcium 10.3 (H) 8.8 - 10.2 mg/dL    Glucose 169 " (H) 70 - 99 mg/dL    GFR Estimate 56 (L) >60 mL/min/1.73m2       RADIOLOGY:  Reviewed all pertinent imaging. Please see official radiology report.  No orders to display       EKG:    None.          I, Asha Nicholson, am serving as a scribe to document services personally performed by Rafael Santos MD based on my observation and the provider's statements to me. I, Dr. Rafael Santos, attest that Asha Nicholson is acting in a scribe capacity, has observed my performance of the services and has documented them in accordance with my direction.    Rafael Santos MD  Emergency Medicine  [facility] EMERGENCY DEPARTMENT  [address] [place] [id number] [phone]     Rafael Santos MD  02/01/23 1514       Rafael Santos MD  02/01/23 2087

## 2023-02-01 NOTE — PROGRESS NOTES
TRANSITIONS OF CARE (JOSE D) LOG   JOSE D tasks should be completed by the CC within one (1) business day of notification of each transition. Follow up contact with member is required after return to their usual care setting.  Note:  If CC finds out about the transitions fifteen (15) days or more after the member has returned to their usual care setting, no JOSE D log is needed. However, the CC should check in with the member to discuss the transition process, any changes needed to the care plan and document it in a case note.    Member Name:  Cristal Preciado [facility] [id number]   Product: [facility] Care Coordinator Contact:  LOR Douglas, CMC covering for Lois Loera RN Agency/[facility]/Care System: [facility]   Transition Communication Actions from Care Management Contact   Transition #1   Notification Date: 2/1/23 Transition Date:   2/1/23 Transition From: Nursing Home, [facility]     Is this the member s usual care setting?               yes Transition To: [facility]   Transition Type:  Unplanned    Reason for Admission/Comments:  Admitted due to weakness, low intake, tailbone pain.    Contact member/responsible party to offer assistance with transition Date completed: 2/1/23    Notes from conversation with the member/responsible party, provider, discharging and receiving facility (as applicable):   Date 2/1/23:CC contacted Hospital /discharge planner via the [facility] Transitional Care Hand-In Process, with community care plan included.  CC reached out to [facility]  Hetal, she e-mailed back including dietician Eliseo saying that for a few weeks Cristal has not been eating well and they have tried mulitple interventions with no success resulting in this hospitalization.  Reviewed and update care plan as needed.  Transition log initiated.   PCP, Aditi Allen, notified of hospitalization via  EMR.    LOR Douglas, [facility]  [phone]     Shared CC contact info, care plan/services with receiving setting--Date completed: 2/1/23   Name & Title of receiving setting contact: [facility]   Notified PCP of transition--Date completed:  2/1/23     via  EMR  Name of PCP: Aditi Allen     Transition #2   Notification Date: 2/3/2023 Transition Date:   2/3/2023 Transition From: Hospital, [facility]     Is this the member s usual care setting?               no Transition To: Nursing Home, [facility] SNF   Transition Type:  Planned  Reason for Admission/Comments:  Heartburn, fatigue, pressure injury sacral  Contact member/responsible party to offer assistance with transition Date completed: 2/6/2023    Notes from conversation with the member/responsible party, provider, discharging and receiving facility (as applicable):   Date 2/6/2023:CC contacted Hetal HOROWITZ :  Member has a follow-up appointment with PCP in 7 days: Yes: scheduled on Will see [facility] NP   Member has had a change in condition: No  Home visit needed: No  Care plan reviewed and updated.  The following home based services None were resumed.  New referrals placed: No  Transition log completed.   PCP, Aditi Allen, notified of transition back to home via EMR.       Shared CC contact info, care plan/services with receiving setting--Date completed: 2/6/2023   Name & Title of receiving setting contact: [facility]   Notified PCP of transition--Date completed:  2/3/2023     via  EMR  Name of PCP: Aditi Allen      *RETURN TO USUAL CARE SETTING: *Complete tasks below when the member is discharging TO their usual care setting within one (1) business day of notification..      For situations where the Care Coordinator is notified of the discharge prior to the date of discharge, the Care Coordinator must follow up with the member or designated representative to  confirm that discharge actually occurred and discuss required JOSE D tasks as outlined in the JOSE D Instructions.  (This includes situations where it may be a  new  usual care setting for the member. (i.e., a community member who decides upon permanent nursing home placement following hospitalization and rehab).    Discuss with Member/Responsible Party:    Check  Yes  - if the member, family member and/or SNF/facility staff manages the following:    If  No  provide explanation in the comments section.          Date completed: 2/6/2023 Communicated with member or their designated representative about the following:  care transition process; about changes to the member s health status; plan of care updates; education about transitions and how to prevent unplanned transitions/readmissions    Four Pillars for Optimal Transition:    Check  Yes  - if the member, family member and/or SNF/facility staff manages the following:    If  No  provide explanation in the comments section.          [x]  Yes     []  No Does the member have a follow-up appointment scheduled with primary care or specialist? (Mental health hospitalizations--the appt. should be w/in 7 days)              For mental health hospitalizations:  []  Yes     []  No     Does the member have a follow-up appointment scheduled with a mental health practitioner within 7 days of discharge?  [x]  Yes     []  No     Has a medication review been completed with member? If no, refer to PCP, home care nurse, MTM, pharmacist  [x]  Yes     []  No     Can the member manage their medications or is there a system in place to manage medications (e.g. home care set-up)?         [x]  Yes     []  No     Can the member verbalize warning signs and symptoms to watch for and how to respond?  [x]  Yes     []  No     Does the member have a copy of and understand their discharge instructions?  If no, assist to obtain copy of discharge instructions, review discharge instructions, and assist to  contact PCP to discuss questions about their recent hospitalization.  [x]  Yes     []  No     Does the member have adequate food, housing and transportation?  If no, add goal and discuss additional supports available to the member                                                                                                                                                                                 [x]  Yes     []  No     Is the member safe in their home?  If no, document needs and support provided                                                                                                                                                                          []  Yes     [x]  No     Are there any concerns of vulnerability, abuse, or neglect?  If yes, document concerns and actions taken by Care Coordinator as a mandated                                                                                                                                                                              [x]  Yes     []  No     Does the member use a Personal Health Care Record?  Check  Yes  if visit summary, discharge summary, and/or healthcare summary are being used as a PHR.                                                                                                                                                                                  [x]  Yes     []  No     Have you reviewed the discharge summary with the member? If  No  provide explanation in comments.  [x]  Yes     []  No     Have you updated the member s care plan/support plan? Add new diagnosis, medications, treatments, goals & interventions, as applicable. If No, provide explanation in comments.    Comments:    LTC resident       Notes from conversation with the member/responsible party, provider, discharging and receiving facility (as applicable): CC contacted Hetal HOROWITZ and   Member has a follow-up appointment with PCP in 7 days: Yes:  scheduled on Will see [facility] NP   Member has had a change in condition: No  Home visit needed: No  Care plan reviewed and updated.  The following home based services None were resumed.  New referrals placed: No  Transition log completed.   PCP, Aditi Allen, notified of transition back to home via EMR.    Lois Loera RN  Care Coordinator-Long Term Care  [address] [address]  [email or web address]   [email or web address]     Office: [phone]   Fax: [phone]

## 2023-02-02 NOTE — PROGRESS NOTES
Physical Therapy: Orders received. Chart reviewed and discussed with care team.? Physical Therapy not indicated due to patient is dependent with transfers using a mechanical lift at baseline.? Defer discharge recommendations to case management.? Will complete orders.     Valerie Flowers, PT, DPT

## 2023-02-02 NOTE — PLAN OF CARE
PRIMARY DIAGNOSIS: GENERALIZED WEAKNESS    OUTPATIENT/OBSERVATION GOALS TO BE MET BEFORE DISCHARGE  1. Orthostatic performed: No    2. Tolerating PO medications:  no medications given overnight    3. Return to near baseline physical activity: Yes    4. Cleared for discharge by consultants (if involved): No    Discharge Planner Nurse   Safe discharge environment identified: Yes  Barriers to discharge: Yes       Entered by: Barbie Menjivar RN 02/02/2023 5:43 AM   Awaiting consults from [facility] and general surgery for sacral ulcer. Mepilex applied to wounds per previous RN, dressings CDI.   Please review provider order for any additional goals.   Nurse to notify provider when observation goals have been met and patient is ready for discharge.Goal Outcome Evaluation:

## 2023-02-02 NOTE — ED NOTES
"[facility] ED Handoff Report    ED Chief Complaint: weight loss, poor oral intake    ED Diagnosis:  (R53.83) Other fatigue    (L89.156) Pressure injury of deep tissue of sacral region         PMH:    Past Medical History:   Diagnosis Date    AION (anterior ischaemic optic neuropathy), left eye     NAION LE    CAD (coronary artery disease) 3/2009    [facility]; Angio 2013 UM- normal coronary arteries    Cataract     CVA (cerebral vascular accident) (H)     admitted at [facility]     on Cymbalta    Diabetes mellitus, type 2 (H)     Diabetic nephropathy (H)     Diabetic neuropathy (H)     severe    Diabetic retinopathy (H)     GERD (gastroesophageal reflux disease)     Hyperlipidemia     Hypertension     ECHO 2013, TDS, NL EF    Infection due to 2019 novel coronavirus 1/4/2023    POAG (primary open-angle glaucoma)     adv BE    Seasonal allergies     Tubular adenoma of colon 2013    repeat colonoscopy in 2018        Code Status:  No CPR- Pre-arrest intubation OK     Falls Risk: Yes Band: Applied    Current Living Situation/Residence: lives in a skilled nursing facility [facility]    Elimination Status: Continent: No - brief and pur-wick (repositioned and brief changed last at 1400)    Activity Level: Total assist/lift    Patients Preferred Language:  English     Needed: No    Vital Signs:  /53   Pulse 86   Temp 98.5  F (36.9  C) (Axillary)   Resp 20   Ht 1.702 m (5' 7\")   SpO2 100%   BMI 23.85 kg/m       Cardiac Rhythm:     Pain Score: Patient is unable to quantify.    Is the Patient Confused:  Yes    Last Food or Drink: 02/02/23 at 0100 drank half glass of water, pt refuses to eat (did not eat breakfast or lunch).    Focused Assessment:  Pt is weak, baseline confusion, pressure ulcer sacral region. Able to ask for water and needs.  Pt refuses to eat.     Tests Performed: Done: Labs    Treatments Provided:  Pt repositioning every 2 hours. Pt does not like " to be moved.     Family Dynamics/Concerns: No    Family Updated On Visitor Policy: No    Plan of Care Communicated to Family: No      Additional Information:     RN: Marisa Stevens RN   2/2/2023 2:50 PM

## 2023-02-02 NOTE — DISCHARGE INSTRUCTIONS
WOC DISCHARGE INSTRUCTIONS:  Sacrum:  1. Cleanse with saline, including lloyd wound skin, gently pat dry  2. Apply skin prep to lloyd wound skin, cover with Tegaderm hydrocolloid  Change every other day

## 2023-02-02 NOTE — ED NOTES
[facility]  Expected date:   Expected time:   Means of arrival:   Comments:  ED#9

## 2023-02-02 NOTE — ED NOTES
"Pt refused meal tray. Pt drank half glass of water. RN offered pt multiple items on tray. Pt yelled \"just leave me alone\".   "

## 2023-02-02 NOTE — CONSULTS
General Surgery Consultation  Cristal Preciado MRN# [id number]   Age/Sex: 72 year old female YOB: 1950     Reason for consult: 1. Other fatigue    2. Pressure injury of deep tissue of sacral region            Requesting physician: Dr Islas                   Assessment and Plan:   Assessment:  Right trochanter pressure ulcer unable to stage and most likely stage IV 3.5 cm x 3.5 cm and 1 cm deep with necrosis and at bedside debridement.   Sacral decub pressure ulcer stage IV opening 3 x 3 cm with depth of 2 cm and undermining inferiorly 3 cm with fine light necrosis along the fascia of the sacrum    Plan:  -Would like wound care nurse to see patient and work on topical debridements.   -I will plan to revisit patient tomorrow and see if I need to do any further mechanical bedside debridement  -Continue supportive cares and offloading areas of ulcers.  -Upon discharge we should have patient connect back to wound care clinic for further cares.  -Thank you for consulting us involving us in her care.  We will continue to follow along.          Chief Complaint:     Chief Complaint   Patient presents with     Weight Loss     Tailbone Pain        History is obtained from the patient and with ER and hospitalist notes    HPI:   Cristal Preciado is a 72 year old female who is nonambulatory, type 2 diabetes, diabetic neuropathy, CAD, CVA, hypertension, CKD, stroke who presents to the emergency room yesterday afternoon with concerns of tailbone pain and skin breakdown.  Reports losing about 20 pounds in the past 2 to 3 months.  In the past she has been seen by wound care but has not been in for about 3 months.  Currently patient is resting in bed.  She states that she is comfortable but little cold.  She tells me that she does have tailbone pain.  Patient agreeable for me to take a look at her wounds and do a bedside debridement if needed.       Past Medical History:     Past Medical History:   Diagnosis Date     TOBIAS  (anterior ischaemic optic neuropathy), left eye     NAION LE     CAD (coronary artery disease) 3/2009    [facility]; Angio  UM- normal coronary arteries     Cataract      CVA (cerebral vascular accident) (H)     admitted at [facility]     on Cymbalta     Diabetes mellitus, type 2 (H)      Diabetic nephropathy (H)      Diabetic neuropathy (H)     severe     Diabetic retinopathy (H)      GERD (gastroesophageal reflux disease)      Hyperlipidemia      Hypertension     ECHO 2013, TDS, NL EF     Infection due to  novel coronavirus 2023     POAG (primary open-angle glaucoma)     adv BE     Seasonal allergies      Tubular adenoma of colon 2013    repeat colonoscopy in 2018              Past Surgical History:     Past Surgical History:   Procedure Laterality Date     CARDIAC CATHETERIZATION       CATARACT EXTRACTION W/ INTRAOCULAR LENS IMPLANT Bilateral       SECTION        SECTION       COLONOSCOPY  7/15/2013    Tubular adenoma; repeat in ;Procedure: COMBINED COLONOSCOPY, SINGLE BIOPSY/POLYPECTOMY BY BIOPSY;;  Surgeon: Don King MD;  Tubular adenoma     COLONOSCOPY N/A 2020    Procedure: COLONOSCOPY;  Surgeon: Sid Amanda MD;  Location: [facility] GI     CORONARY STENT PLACEMENT  2004    RCA     DAVINCI HYSTERECTOMY TOTAL, BILATERAL SALPINGO-OOPHORECTOMY, COMBINED N/A 2019    Procedure: DaVinci Assisted Total Laparoscopic Hysterectomy, Removal Of Both Tubes And Ovaries;  Surgeon: Linh Cardoso MD;  Location: [facility] OR     EXTRACAPSULAR CATARACT EXTRATION WITH INTRAOCULAR LENS IMPLANT  11-10-09, 2-9-10    11-10-09 Lt, 2-9-10 Rt; Left eye 2012     HYSTERECTOMY TOTAL ABDOMINAL, BILATERAL SALPINGO-OOPHORECTOMY, COMBINED  2019    Procedure: DaVinci Assisted Total Laparoscopic Hysterectomy, Removal Of Both Tubes And Ovaries; Surgeon: Linh Cardoso MD; Location: [facility] OR       STENT, CORONARY, DELORES  2009    RCA             Social  History:    reports that she quit smoking about 9 years ago. Her smoking use included cigarettes. She started smoking about 55 years ago. She has a 67.50 pack-year smoking history. She has never used smokeless tobacco. She reports that she does not currently use alcohol. She reports that she does not use drugs.           Family History:     Family History   Problem Relation Age of Onset     Hypertension Mother      Cerebrovascular Disease Mother      Glaucoma Father      Cancer Father      Diabetes Sister      Glaucoma Sister      Cancer - colorectal Other      Cerebrovascular Disease Other      Skin Cancer No family hx of      Melanoma No family hx of      Anesthesia Reaction No family hx of      Deep Vein Thrombosis (DVT) No family hx of      Arthritis Brother      Diabetes Sister      Glaucoma Sister               Allergies:     Allergies   Allergen Reactions     Dust Mites Other (See Comments)     Sneezing runny eyes and nose.     Food Allergy Formula Hives     Mountain Dew and Walnuts     Pollen Extract Other (See Comments)     Sneezing runny eyes and nose.              Medications:     Prior to Admission medications    Medication Sig Start Date End Date Taking? Authorizing Provider   acetaminophen (TYLENOL) 500 MG tablet Take 1,000 mg by mouth 3 times daily   Yes Reported, Patient   ASPIRIN LOW DOSE 81 MG chewable tablet Take 81 mg by mouth daily 6/12/19  Yes Reported, Patient   atorvastatin (LIPITOR) 80 MG tablet Take 1 tablet by mouth daily. Pt needs to have labs done prior to further refills. 10/13/11  Yes Edward Rose MD   bisacodyl (DULCOLAX) 10 MG suppository Place 10 mg rectally daily as needed for constipation   Yes Reported, Patient   diclofenac (VOLTAREN) 1 % topical gel Apply 2 g topically 4 times daily   Yes Reported, Patient   dorzolamide-timolol (COSOPT) 2-0.5 % ophthalmic solution Place 1 drop into both eyes 2 times daily 6/28/22  Yes Jessica Ramsay MD   DULoxetine (CYMBALTA) 30 MG capsule  Take 90 mg by mouth every morning    Yes Reported, Patient   hydrocortisone, Perianal, (HYDROCORTISONE) 2.5 % cream Place rectally 2 times daily as needed for hemorrhoids  Patient taking differently: Place rectally 2 times daily For hemorrhoids 12/9/22  Yes Amanda Graham APRN CNP   insulin glargine (LANTUS PEN) 100 UNIT/ML pen Inject 20 Units Subcutaneous every morning (before breakfast) 1/8/23  Yes Anjelica Sharif MD   insulin lispro (HUMALOG KWIKPEN) 100 UNIT/ML (1 unit dial) KWIKPEN Inject Subcutaneous 3 times daily (before meals) 140-189=1 unit  190-239= 2 units  240-289= 3 units  290-339= 4 units  340-399= 5 units  400-999= 6 units   Yes Unknown, Entered By History   insulin lispro (HUMALOG KWIKPEN) 100 UNIT/ML (1 unit dial) KWIKPEN Inject Subcutaneous At Bedtime 200-249=1 unit  250-299= 2 units  300-349= 3 units  350-399= 4 units  400-999= 5units   Yes Unknown, Entered By History   ketoconazole (NIZORAL) 2 % shampoo To entire wet scalp and ears and then wash off after 5 minutes three times a week.  Patient taking differently: To entire wet scalp and ears and then wash off after 5 minutes Tuesday and Friday 2/20/18  Yes Rex Rubio MD   latanoprost (XALATAN) 0.005 % ophthalmic solution Place 1 drop into both eyes At Bedtime 6/28/22  Yes Jessica Ramsay MD   liraglutide (VICTOZA PEN) 18 MG/3ML solution Inject subcu 1.2mg daily. 9/28/22  Yes Renetta Washington PA-C   loratadine (CLARITIN) 10 MG tablet Take 10 mg by mouth as needed.   Yes Reported, Patient   melatonin 3 MG tablet Take 3 mg by mouth At Bedtime   Yes Unknown, Entered By History   metFORMIN (GLUCOPHAGE-XR) 500 MG 24 hr tablet Take 2 tablets (1,000 mg) by mouth daily (with dinner) 11/26/18  Yes Renetta Washington PA-C   metoprolol tartrate (LOPRESSOR) 100 MG tablet Take 100 mg by mouth 2 times daily   Yes Reported, Patient   nitroGLYCERIN (NITROSTAT) 0.4 MG SL tablet Place 0.4 mg under the tongue every 5 minutes as needed.   Yes Reported, Patient    olopatadine (PATADAY) 0.2 % ophthalmic solution Place 1 drop into both eyes daily For itchy eyes 6/28/22  Yes Jessica Ramsay MD   omeprazole (PRILOSEC) 20 MG DR capsule Take 20 mg by mouth daily   Yes Reported, Patient   polyvinyl alcohol (LIQUIFILM TEARS) 1.4 % ophthalmic solution Place 1 drop into both eyes 3 times daily   Yes Reported, Patient   senna-docusate (SENOKOT-S/PERICOLACE) 8.6-50 MG tablet Take 2 tablets by mouth 2 times daily as needed for constipation 8/6/19  Yes Lisa Stevens APRN CNP   simethicone (MYLICON) 125 MG chewable tablet Take 125 mg by mouth 4 times daily as needed After meals and HS prn   Yes Reported, Patient   traMADol (ULTRAM) 50 MG tablet Take 0.5 tablets (25 mg) by mouth 2 times daily. May also take 0.5 tablets (25 mg) 2 times daily as needed for severe pain (7-10). May give together if scheduled 25mg is ineffective 1/20/23  Yes Aditi Allen APRN CNP   White Petrolatum-Mineral Oil (REFRESH P.M.) OINT Apply to eye nightly as needed   Yes Reported, Patient   chlorthalidone (HYGROTON) 25 MG tablet Take 25 mg by mouth every morning     Reported, Patient   loperamide (IMODIUM A-D) 2 MG tablet Take 2 mg by mouth 4 times daily as needed for diarrhea    Reported, Patient              Review of Systems:   The Review of Systems is negative other than noted in the HPI            Physical Exam:     Patient Vitals for the past 24 hrs:   BP Temp Temp src Pulse Resp SpO2   02/02/23 0805 117/51 -- -- 83 20 100 %   02/02/23 0731 126/69 98.5  F (36.9  C) Axillary 88 20 100 %   02/02/23 0357 115/59 98.5  F (36.9  C) Oral 80 20 100 %   02/02/23 0000 111/63 -- -- 83 -- 100 %   02/01/23 1900 118/64 -- -- -- -- --   02/01/23 1845 112/56 -- -- -- -- --   02/01/23 1830 116/56 -- -- -- -- --   02/01/23 1745 114/57 -- -- 80 -- 100 %   02/01/23 1730 139/57 -- -- 80 -- 100 %   02/01/23 1715 113/59 -- -- 79 -- 100 %   02/01/23 1645 116/58 -- -- 77 -- 100 %   02/01/23 1630 139/58 -- -- 77 -- 100 %    02/01/23 1600 (!) 149/65 -- -- 76 -- 100 %   02/01/23 1500 129/63 -- -- 70 -- 100 %   02/01/23 1445 (!) 141/70 -- -- 70 -- 100 %   02/01/23 1430 111/78 -- -- 72 -- 100 %   02/01/23 1415 101/55 -- -- 81 -- 100 %   02/01/23 1400 102/52 -- -- 79 -- 100 %   02/01/23 1345 90/48 -- -- 80 -- 100 %   02/01/23 1330 115/46 -- -- 77 -- 100 %   02/01/23 1315 112/50 -- -- 77 -- 100 %   02/01/23 1300 109/51 -- -- 77 -- 100 %   02/01/23 1245 -- -- -- 77 -- 100 %   02/01/23 1230 -- -- -- 75 -- 100 %   02/01/23 1215 -- -- -- 76 -- 100 %   02/01/23 1200 -- -- -- 74 -- 100 %        No intake or output data in the 24 hours ending 02/02/23 1148   Constitutional:   awake, alert, cooperative, no apparent distress, and looks older than she is      Right trochanter pressure wound measures 3.5 cm x 3.5 cm and 1 cm depth without any undermining.  Has thick necrotic foul-smelling tissue and some dry necrosis.  Was able to use 15 blade as well as iris scissors and pickups to remove approximately 3 x 3 cm in 1 cm depth of necrotic tissue.  No purulence seen.  I did get do some healthy-josephine tissue with some bleeding but very minimal bleeding.  Loss of blood was less than a cc.  I packed the wound with half of a 4 x 4 gauze moistened with saline and cover with Mepilex for now.  With the help with the nurse were able to remove the patient further so I can examine her sacrum and she has a 3 x 3 opening of her skin and on the edges is pink granulated tissue approximately half a centimeter around the edges.  The depth to the sacrum and has no tissue other than dark necrotic fascia and tender to palpitation.  Has a 3 cm undermining inferiorly to wound and palpated the wound and can appreciate firm tissue but no definite soft necrosis or purulence.  This was quite tender for the patient and not really able to tolerate just a minimal amount of debridement that I tried to do at the base of the wound.  I packed this again with half of a 4 x 4 gauze with  saline and covered sacrum with the Mepilex                         Data:            Results for orders placed or performed during the hospital encounter of 02/01/23 (from the past 24 hour(s))   CBC (+ platelets, no diff)   Result Value Ref Range    WBC Count 6.6 4.0 - 11.0 10e3/uL    RBC Count 4.06 3.80 - 5.20 10e6/uL    Hemoglobin 12.3 11.7 - 15.7 g/dL    Hematocrit 39.6 35.0 - 47.0 %    MCV 98 78 - 100 fL    MCH 30.3 26.5 - 33.0 pg    MCHC 31.1 (L) 31.5 - 36.5 g/dL    RDW 14.0 10.0 - 15.0 %    Platelet Count 260 150 - 450 10e3/uL   Hepatic function panel   Result Value Ref Range    Protein Total 8.0 6.4 - 8.3 g/dL    Albumin 3.6 3.5 - 5.2 g/dL    Bilirubin Total 0.4 <=1.2 mg/dL    Alkaline Phosphatase 223 (H) 35 - 104 U/L    AST 56 (H) 10 - 35 U/L    ALT 31 10 - 35 U/L    Bilirubin Direct 0.20 0.00 - 0.30 mg/dL   Basic metabolic panel   Result Value Ref Range    Sodium 139 136 - 145 mmol/L    Potassium 4.3 3.4 - 5.3 mmol/L    Chloride 104 98 - 107 mmol/L    Carbon Dioxide (CO2) 23 22 - 29 mmol/L    Anion Gap 12 7 - 15 mmol/L    Urea Nitrogen 34.8 (H) 8.0 - 23.0 mg/dL    Creatinine 1.06 (H) 0.51 - 0.95 mg/dL    Calcium 10.3 (H) 8.8 - 10.2 mg/dL    Glucose 169 (H) 70 - 99 mg/dL    GFR Estimate 56 (L) >60 mL/min/1.73m2     *Note: Due to a large number of results and/or encounters for the requested time period, some results have not been displayed. A complete set of results can be found in Results Review.        JODY Zavala  [facility]  [address] [place] [address]  Phone- [phone]  Fax- [phone]

## 2023-02-02 NOTE — CONSULTS
[facility] Nurse Inpatient Assessment     Consulted for: sacral ulcer    Patient History (according to provider note(s):      Per H+P:  72 year old female with a pertinent history of type 2 diabetes mellitus, diabetic neuropathy, CAD, CVA, hypertension, hyperlipidemia, CKD, stroke, who presents to this ED EMS for evaluation of weight loss and tailbone pain with skin breakdown.      Patient reports increased significant weight loss recently. She states she lost about 20 lbs within the past 2-3 months. Her providers have been giving her nutrition supplements and protein drinks but she states she can't finish it all. She denies pain with swallowing and denies coughing or choking while eating or drinking. She also denies abdominal pain, nausea, and change in bowel habits.  She also endorses tailbone pain. The ulcer is deeper and more foul smelling today. She has rook boots on her bilateral legs. She hasn't seen wound care for the past 3 months. She hasn't walked for 5 years.    Areas Assessed:      Areas visualized during today's visit: Sacrum/coccyx and buttocks/ITs    Pressure Injury Location: Coccyx and Right IT     Coccyx      Right IT  Last photo: 2/2  Wound type: Pressure Injury     Pressure Injury Stage: 4, present on admission   Wound history/plan of care:   See above  Wound base: Coccyx unable to visualize wound bed but bone palpated, Right IT blood cloot and granular, bone palpated at wound base     Palpation of the wound bed: normal      Drainage: scant     Description of drainage: serosanguinous     Measurements (length x width x depth, in cm) Measurements not taken today     Tunneling/Undermining noted in coccyx wound, largest at 6 o'clock  Periwound skin: Scar tissue      Color: normal and consistent with surrounding tissue      Temperature: normal   Odor: none  Pain: denies   Treatment goal: Maintain (prevention of deterioration)  STATUS: initial assessment  Supplies ordered:  ordered Vashe    Patient did not want to be turned, did allow WOC nurse to assess wound, but did not tolerate lying on side well  Wll assess for VAC if patient admitted to hospital unit    Treatment Plan:     Coccyx and Right IT:  1. Cleanse with Vashe, including lloyd wound skin, gently pat dry  2. Pack gently with Vashe moistened gauze and secure with Mepilex  Change daily  If supplies needed from stores, Vashe PS# [id number]    Orders: Written    RECOMMEND PRIMARY TEAM ORDER: None, at this time  Education provided: plan of care  Discussed plan of care with: Patient and Nurse  WOC nurse follow-up plan: weekly  Notify WOC if wound(s) deteriorate.  Nursing to notify the Provider(s) and re-consult the WOC Nurse if new skin concern.    DATA:     Current support surface: ED cart - need mattress with air pump id admitted to a unit  Containment of urine/stool: Incontinent pad in bed  BMI: Body mass index is 23.85 kg/m .   Active diet order: Orders Placed This Encounter      Moderate Consistent Carb (60 g CHO per Meal) Diet     Output: No intake/output data recorded.     Labs: Recent Labs   Lab 02/01/23  1254   ALBUMIN 3.6   HGB 12.3   WBC 6.6     Pressure injury risk assessment:   Sensory Perception: 3-->slightly limited  Moisture: 3-->occasionally moist  Activity: 1-->bedfast  Mobility: 2-->very limited  Nutrition: 2-->probably inadequate  Friction and Shear: 2-->potential problem  Lalit Score: 13    Valerie Crespo, CRISN, RN, PHN, HNB-BC, CWOCN

## 2023-02-02 NOTE — PLAN OF CARE
Occupational Therapy Discharge Summary    Occupational Therapy: Orders received. Chart reviewed and discussed with care team.? Occupational Therapy not indicated per chart review pt is dependent w/ all transfers/self cares.? Defer discharge recommendations to interdisciplinary team.? Will complete orders.          SALENA Love, OTR/L, 2/2/2023, 8:50 AM

## 2023-02-02 NOTE — ED NOTES
Pt turned and repo in bed. Mepelix dressing on the wound on her coccyx, R trochanter and perineum. Meal delivered to the pt but pt declined to eat. Tray left in the room for pt to try again later.

## 2023-02-03 PROBLEM — F03.90 DEMENTIA (H): Status: RESOLVED | Noted: 2023-01-01 | Resolved: 2023-01-01

## 2023-02-03 PROBLEM — G63 POLYNEUROPATHY ASSOCIATED WITH UNDERLYING DISEASE (H): Status: ACTIVE | Noted: 2023-01-01

## 2023-02-03 PROBLEM — R41.89 COGNITIVE IMPAIRMENT: Status: ACTIVE | Noted: 2023-01-01

## 2023-02-03 PROBLEM — F03.90 DEMENTIA (H): Status: ACTIVE | Noted: 2023-01-01

## 2023-02-03 NOTE — PLAN OF CARE
PRIMARY DIAGNOSIS: GENERALIZED WEAKNESS    OUTPATIENT/OBSERVATION GOALS TO BE MET BEFORE DISCHARGE  1. Orthostatic performed: N/A    2. Tolerating PO medications: Yes    3. Return to near baseline physical activity: Yes    4. Cleared for discharge by consultants (if involved): Yes    Discharge Planner Nurse   Safe discharge environment identified: Yes  Barriers to discharge: No       Entered by: Mariza Payne RN 02/03/2023 5:29 PM   Pt to discharge back to [facility] at 1730.  Please review provider order for any additional goals.   Nurse to notify provider when observation goals have been met and patient is ready for discharge.Goal Outcome Evaluation:

## 2023-02-03 NOTE — DISCHARGE SUMMARY
[facility]    Discharge Summary  Hospitalist    Date of Admission:  2/1/2023  Date of Discharge:  2/3/2023  Discharging Provider: Vu Coleman MD, MD    Discharge Diagnoses   Principal Problem:    Failure to thrive in adult      Hypertension      H/O: stroke      DM type 2 w Neuro/Retin/Nephr -- hgb A1C 7.6 on 1/3/23      Sacral decubitus ulcer      Acute kidney injury (H)      Cognitive impairment Consistent with Mild Dementia      History of Present Illness   72 year old female with a pertinent history of type 2 diabetes mellitus, diabetic neuropathy, CAD, CVA, hypertension, hyperlipidemia, CKD, stroke, who presents to this ED EMS for evaluation of weight loss and tailbone pain with skin breakdown.      Patient reports increased significant weight loss recently. She states she lost about 20 lbs within the past 2-3 months. Her providers have been giving her nutrition supplements and protein drinks but she states she can't finish it all. She denies pain with swallowing and denies coughing or choking while eating or drinking. She also denies abdominal pain, nausea, and change in bowel habits.  She also endorses tailbone pain. The ulcer is deeper and more foul smelling today. She has rook boots on her bilateral legs. She hasn't seen wound care for the past 3 months. She hasn't walked for 5 years.     In ER, AVSS, BUN 35, Creat 1.09 (Creat 0.59 on 1/7/23), glucose 169, Alk Phos 223, AST 56, Calcium 10.3. WBC 6.6 with hgb 12.3.   Decub Ulcer over Coccyx and Right IT:    Hospital Course   Admitted to medical floor, seen by wound care nurse and treated for the right IT decub ulcer which included:  1. Cleanse with Vashe, including lloyd wound skin, gently pat dry  2. Pack gently with Vashe moistened gauze and secure with Mepilex  Change daily    Was seen by DANIELLE, she is scheduled for an outpatient EGD on 3/1/23, but daughter did not want to wait -- did try to have it done while here but  "could not work it into schedule.      Did discuss the diagnosis with her daughter -- patient had not walked for several years, suspect related to her stroke.  Daughter said she did not think it was a stroke -- \"It was only a TIA\".  Given her age of 72 and being bed ridden I suspect it was a stroke.    Patient also with memory loss -- she did not know the month, and sometimes off on the year, for the most part was pleasant but some times would yell out.  Informed daughter all consistent with mild dementia.  Again daughter did not want that placed in her chart for some reason.  She said she had Covid December 2022, and has been forgetful since then, and \"No doctor has ever said she has dementia\".  Explained I would be happy to talk to any other physicians, and said she would take care of it and get her own opinions.      Patient was sent back to care center with ongoing wound care, and plans for future EGD, and did give her daughter my number to call if any questions or new problems.     Prior Creat 0.59 on 1/7/23. Her diuretic was stopped as her renal function had declined, probably from poor oral intake.  Creat improved to 0.96 while here.     Vu Coleman MD  Pager: [phone]  Cell Phone:  [phone]       Significant Results and Procedures   As above    Pending Results   These results will be followed up by Dr. Coleman  Unresulted Labs Ordered in the Past 30 Days of this Admission     No orders found from 1/2/2023 to 2/2/2023.          Code Status   DNR / DNI       Primary Care Physician   Aditi Allen    Physical Exam   Temp: 97.6  F (36.4  C) Temp src: Oral BP: 130/60 Pulse: 69   Resp: 16 SpO2: 99 % O2 Device: None (Room air)    There were no vitals filed for this visit.  Vital Signs with Ranges  Temp:  [97.2  F (36.2  C)-98.1  F (36.7  C)] 97.6  F (36.4  C)  Pulse:  [67-86] 69  Resp:  [16-18] 16  BP: (107-130)/(53-62) 130/60  SpO2:  [97 %-100 %] 99 %  I/O last 3 completed shifts:  In: - "   Out: 350 [Urine:350]    Exam on discharge:   Lungs clear  CV with reg S1S2  Neuro -- alert, Ox2    Discharge Disposition   Discharged to nursing home  Condition at discharge: Fair    Consultations This Hospital Stay   SURGERY GENERAL IP CONSULT  PHYSICAL THERAPY ADULT IP CONSULT  OCCUPATIONAL THERAPY ADULT IP CONSULT  WOUND OSTOMY CONTINENCE NURSE  IP CONSULT  GASTROENTEROLOGY IP CONSULT    Time Spent on this Encounter   I spent a total of 35 minutes discharging this patient.     Discharge Orders      General info for SNF    Length of Stay Estimate: Short Term Care: Estimated # of Days <30  Condition at Discharge: Declining  Level of care:skilled   Rehabilitation Potential: Fair  Admission H&P remains valid and up-to-date: Yes  Recent Chemotherapy: N/A  Use Nursing Home Standing Orders: Yes     Mantoux instructions    Give two-step Mantoux (PPD) Per Facility Policy Yes     Follow Up and recommended labs and tests    Follow up with Nursing home physician in 1 week, and keep appointment with [facility] on 3/1/23 for EGD.     Follow-up with [facility] wound clinic in 2 weeks.      Call Dr. Islas if any medical questions at Cell Phone [phone].     Reason for your hospital stay    Failure to thrive     Glucose monitor nursing POCT    Before meals three times a day.     Additional Discharge Instructions    Call Dr. Islas if any medical questions at Cell Phone [phone].     Activity - Up with nursing assistance     Wound care    Right hip and sacral area, Mepilex daily as directed.     No CPR- Do NOT Intubate     Diet    Follow this diet upon discharge: Orders Placed This Encounter      Pureed Diet (level 4) Extremely Thick (level 4)     Discharge Medications   Current Discharge Medication List      START taking these medications    Details   traZODone (DESYREL) 50 MG tablet Take 1 tablet (50 mg) by mouth At Bedtime  Refills: 0    Associated Diagnoses: Insomnia, unspecified type         CONTINUE these medications  which have CHANGED    Details   acetaminophen (TYLENOL) 325 MG tablet Take 2 tablets (650 mg) by mouth every 6 hours as needed for mild pain or other (and adjunct with moderate or severe pain or per patient request)  Refills: 0    Associated Diagnoses: Pain      metFORMIN (GLUCOPHAGE XR) 500 MG 24 hr tablet Take 1 tablet (500 mg) by mouth daily (with dinner)  Qty: 180 tablet, Refills: 3    Associated Diagnoses: Well controlled type 2 diabetes mellitus (H)      omeprazole (PRILOSEC) 20 MG DR capsule Take 2 capsules (40 mg) by mouth daily  Refills: 0    Associated Diagnoses: Heart burn         CONTINUE these medications which have NOT CHANGED    Details   ASPIRIN LOW DOSE 81 MG chewable tablet Take 81 mg by mouth daily  Refills: 99      atorvastatin (LIPITOR) 80 MG tablet Take 1 tablet by mouth daily. Pt needs to have labs done prior to further refills.  Qty: 90 tablet, Refills: 0    Associated Diagnoses: Other and unspecified hyperlipidemia      bisacodyl (DULCOLAX) 10 MG suppository Place 10 mg rectally daily as needed for constipation      diclofenac (VOLTAREN) 1 % topical gel Apply 2 g topically 4 times daily      dorzolamide-timolol (COSOPT) 2-0.5 % ophthalmic solution Place 1 drop into both eyes 2 times daily  Qty: 10 mL, Refills: 3    Associated Diagnoses: Primary open angle glaucoma (POAG) of both eyes, severe stage      DULoxetine (CYMBALTA) 30 MG capsule Take 90 mg by mouth every morning       hydrocortisone, Perianal, (HYDROCORTISONE) 2.5 % cream Place rectally 2 times daily as needed for hemorrhoids  Qty: 60 g, Refills: 0    Associated Diagnoses: External hemorrhoids      insulin glargine (LANTUS PEN) 100 UNIT/ML pen Inject 20 Units Subcutaneous every morning (before breakfast)  Qty: 15 mL    Comments: If Lantus is not covered by insurance, may substitute Basaglar or Semglee or other insulin glargine product per insurance preference at same dose and frequency.    Associated Diagnoses: Type 2 diabetes  mellitus with hyperglycemia, with long-term current use of insulin (H)      insulin lispro (HUMALOG KWIKPEN) 100 UNIT/ML (1 unit dial) KWIKPEN Inject Subcutaneous 3 times daily (before meals) 140-189=1 unit  190-239= 2 units  240-289= 3 units  290-339= 4 units  340-399= 5 units  400-999= 6 units      ketoconazole (NIZORAL) 2 % shampoo To entire wet scalp and ears and then wash off after 5 minutes three times a week.  Qty: 240 mL, Refills: 11    Associated Diagnoses: Dermatitis, seborrheic      latanoprost (XALATAN) 0.005 % ophthalmic solution Place 1 drop into both eyes At Bedtime  Qty: 5 mL, Refills: 2    Associated Diagnoses: Primary open angle glaucoma (POAG) of both eyes, severe stage      liraglutide (VICTOZA PEN) 18 MG/3ML solution Inject subcu 1.2mg daily.  Qty: 9 mL, Refills: 1    Associated Diagnoses: Type 2 diabetes mellitus with diabetic nephropathy, with long-term current use of insulin (H)      loratadine (CLARITIN) 10 MG tablet Take 10 mg by mouth as needed.      melatonin 3 MG tablet Take 3 mg by mouth At Bedtime      metoprolol tartrate (LOPRESSOR) 100 MG tablet Take 100 mg by mouth 2 times daily      nitroGLYCERIN (NITROSTAT) 0.4 MG SL tablet Place 0.4 mg under the tongue every 5 minutes as needed.      olopatadine (PATADAY) 0.2 % ophthalmic solution Place 1 drop into both eyes daily For itchy eyes  Qty: 2.5 mL, Refills: 3    Associated Diagnoses: History of itching of eye      polyvinyl alcohol (LIQUIFILM TEARS) 1.4 % ophthalmic solution Place 1 drop into both eyes 3 times daily      senna-docusate (SENOKOT-S/PERICOLACE) 8.6-50 MG tablet Take 2 tablets by mouth 2 times daily as needed for constipation  Qty: 60 tablet, Refills: 0    Associated Diagnoses: S/P hysterectomy      simethicone (MYLICON) 125 MG chewable tablet Take 125 mg by mouth 4 times daily as needed After meals and HS prn      White Petrolatum-Mineral Oil (REFRESH P.M.) OINT Apply to eye nightly as needed      loperamide (IMODIUM A-D)  2 MG tablet Take 2 mg by mouth 4 times daily as needed for diarrhea         STOP taking these medications       chlorthalidone (HYGROTON) 25 MG tablet Comments:   Reason for Stopping:         traMADol (ULTRAM) 50 MG tablet Comments:   Reason for Stopping:             Allergies   Allergies   Allergen Reactions     Dust Mites Other (See Comments)     Sneezing runny eyes and nose.     Food Allergy Formula Hives     Mountain Dew and Walnuts     Pollen Extract Other (See Comments)     Sneezing runny eyes and nose.     Data   Most Recent 3 CBC's:Recent Labs   Lab Test 02/03/23  0602 02/01/23  1254 01/06/23  1215   WBC 7.2 6.6 5.2   HGB 11.6* 12.3 10.1*   MCV 98 98 98    260 211      Most Recent 3 BMP's:  Recent Labs   Lab Test 02/03/23  1722 02/03/23  1642 02/03/23  1108 02/03/23  0827 02/03/23  0602 02/02/23  1721 02/01/23  1254 01/09/23  0729   NA  --   --   --   --  141  --  139 139   POTASSIUM  --   --   --   --  4.2  --  4.3 4.2   CHLORIDE  --   --   --   --  105  --  104 105   CO2  --   --   --   --  20*  --  23 20*   BUN  --   --   --   --  37.6*  --  34.8* 17.9   CR  --   --   --   --  0.96*  --  1.06* 0.89   ANIONGAP  --   --   --   --  16*  --  12 14   OLAF  --   --   --   --  9.9  --  10.3* 9.4   GLC 89 98 158*   < > 167*   < > 169* 93    < > = values in this interval not displayed.     Most Recent 2 LFT's:  Recent Labs   Lab Test 02/01/23  1254 01/04/23  0324   AST 56* 69*   ALT 31 55*   ALKPHOS 223* 186*   BILITOTAL 0.4 0.3     Most Recent INR's and Anticoagulation Dosing History:  Anticoagulation Dose History     Recent Dosing and Labs Latest Ref Rng & Units 8/12/2005 6/28/2007 9/14/2010 3/28/2013    INR 0.86 - 1.14 1.00 1.02 0.95 1.12        Most Recent 3 Troponin's:No lab results found.  Most Recent Cholesterol Panel:  Recent Labs   Lab Test 11/18/21  0544   CHOL 92   LDL 30   HDL 26*   TRIG 180*     Most Recent 6 Bacteria Isolates From Any Culture (See EPIC Reports for Culture Details):  Recent Labs    Lab Test 08/06/19  0835 04/29/15  1420   CULT No growth No growth after 4 weeks     Most Recent TSH, T4 and A1c Labs:  Recent Labs   Lab Test 02/03/23  0602 01/03/23  2250   TSH 0.82 1.15   A1C  --  7.6*

## 2023-02-03 NOTE — PROGRESS NOTES
General Surgery Progress Note:    Hospital Day # 0    ASSESSMENT:   1. Other fatigue    2. Pressure injury of deep tissue of sacral region        Cristal Preciado is a 72 year old female with sacral and right trochanter pressure ulcers s/p bedside debridement by myself yesterday in the ER.      PLAN:   -Continue local wounds and follow-up with wound care clinic.  -We will sign off this time.  Please call if there is any question concerns.    SUBJECTIVE:   Cristal Preciado is in bed resting.  I asked if I can take a look at her wounds and she did not want me to take a look and wanted to rest.  I did look at pictures taken by [facility] RN yesterday afternoon after my debridement.  Pictures looked good.    Patient Vitals for the past 24 hrs:   BP Temp Temp src Pulse Resp SpO2   02/03/23 0742 130/60 97.6  F (36.4  C) Oral 69 16 99 %   02/03/23 0405 118/60 98.1  F (36.7  C) Oral 78 16 97 %   02/02/23 2355 119/59 97.2  F (36.2  C) Oral 67 16 100 %   02/02/23 1920 111/59 97.4  F (36.3  C) Oral 85 16 100 %   02/02/23 1604 130/62 97.2  F (36.2  C) Oral 83 18 100 %   02/02/23 1356 107/53 -- -- 86 -- 100 %       Physical Exam:  No exam due to patient declining    Admission on 02/01/2023   Component Date Value     WBC Count 02/01/2023 6.6      RBC Count 02/01/2023 4.06      Hemoglobin 02/01/2023 12.3      Hematocrit 02/01/2023 39.6      MCV 02/01/2023 98      MCH 02/01/2023 30.3      MCHC 02/01/2023 31.1 (L)      RDW 02/01/2023 14.0      Platelet Count 02/01/2023 260      Protein Total 02/01/2023 8.0      Albumin 02/01/2023 3.6      Bilirubin Total 02/01/2023 0.4      Alkaline Phosphatase 02/01/2023 223 (H)      AST 02/01/2023 56 (H)      ALT 02/01/2023 31      Bilirubin Direct 02/01/2023 0.20      Sodium 02/01/2023 139      Potassium 02/01/2023 4.3      Chloride 02/01/2023 104      Carbon Dioxide (CO2) 02/01/2023 23      Anion Gap 02/01/2023 12      Urea Nitrogen 02/01/2023 34.8 (H)      Creatinine 02/01/2023 1.06 (H)      Calcium  02/01/2023 10.3 (H)      Glucose 02/01/2023 169 (H)      GFR Estimate 02/01/2023 56 (L)      GLUCOSE BY METER POCT 02/02/2023 136 (H)      GLUCOSE BY METER POCT 02/02/2023 140 (H)      WBC Count 02/03/2023 7.2      RBC Count 02/03/2023 3.77 (L)      Hemoglobin 02/03/2023 11.6 (L)      Hematocrit 02/03/2023 37.1      MCV 02/03/2023 98      MCH 02/03/2023 30.8      MCHC 02/03/2023 31.3 (L)      RDW 02/03/2023 14.1      Platelet Count 02/03/2023 242      Sodium 02/03/2023 141      Potassium 02/03/2023 4.2      Chloride 02/03/2023 105      Carbon Dioxide (CO2) 02/03/2023 20 (L)      Anion Gap 02/03/2023 16 (H)      Urea Nitrogen 02/03/2023 37.6 (H)      Creatinine 02/03/2023 0.96 (H)      Calcium 02/03/2023 9.9      Glucose 02/03/2023 167 (H)      GFR Estimate 02/03/2023 63      GLUCOSE BY METER POCT 02/03/2023 162 (H)      TSH 02/03/2023 0.82      GLUCOSE BY METER POCT 02/03/2023 158 (H)         JODY ZavalaC  [facility]  [address] [place] [id number]  Phone- [phone]  Fax- [phone]

## 2023-02-03 NOTE — PLAN OF CARE
PRIMARY DIAGNOSIS: GENERALIZED WEAKNESS    OUTPATIENT/OBSERVATION GOALS TO BE MET BEFORE DISCHARGE  1. Orthostatic performed: N/A    2. Tolerating PO medications: Yes    3. Return to near baseline physical activity: No    4. Cleared for discharge by consultants (if involved): No    Discharge Planner Nurse   Safe discharge environment identified: Yes  Barriers to discharge: Yes       Entered by: Yeimi Madsen RN 02/02/2023 9:37 PM     Please review provider order for any additional goals.   Nurse to notify provider when observation goals have been met and patient is ready for discharge.Goal Outcome Evaluation:       Pt presented to the hospital on 2/01 from her long term care facility with concerns regarding malnutrition. Pt has been refusing to eat and has had significant wt loss over the last 2-3 months. Pt does have dementia and is a poor historian. She does not want to be at the hospital and has been very irritable with staff. Does not use her call light to request assistance but yells out from her room and yells at staff while in room. GI consult placed. Pt will be NPO after midnight for EGD tomorrow. Pt is w/c bound at baseline. Rook boots in place to bilateral lower extremities. She does have a pressure ulcer on her coccyx as well as a wound to the right IT. Mepilex in place. WOC consulting. General surgery to consult and determine whether debridement or VAC placement would be appropriate. Air mattress placed on bed. Pt is uncooperative with cares. Resistive to turn and repo. Pure Wick in place for urinary incontinence. She is wearing a brief for protection. Appetite is poor. She is on a diabetic diet. Refused meal. Blood sugar was 136 before dinner and 140 at hs. Receives s/s insulin coverage. IV fluids running at 100 ml/hr of NS. Takes her medications crushed in applesauce. Pt refused portion of her medications this evening. She denies pain however, yells out whenever touched or moved.

## 2023-02-03 NOTE — PROGRESS NOTES
Care Management Discharge Note    Discharge Date: 02/03/2023     Discharge Disposition: Long Term Care    Discharge Services:      Discharge DME:  None    Discharge Transportation:  [facility] R2G stretcher at 1730    Private pay costs discussed:     PAS Confirmation Code:  Not applicable to return to facility  Patient/family educated on Medicare website which has current facility and service quality ratings:      Education Provided on the Discharge Plan:    Persons Notified of Discharge Plans:   Patient/Family in Agreement with the Plan:  Yes    Handoff Referral Completed: Yes    Additional Information:  Chart reviewed and plan of care discussed with hospitalist.  Plan to discharge pt back her her SNF today.    Call placed to [facility] and spoke with Trevin in Admissions. Provided update of return to Long-Term Care today.      Call placed to [facility] and scheduled stretcher transport at 1730.  PCS completed.    Milagro Benites RN

## 2023-02-03 NOTE — PLAN OF CARE
PRIMARY DIAGNOSIS: GENERALIZED WEAKNESS    OUTPATIENT/OBSERVATION GOALS TO BE MET BEFORE DISCHARGE  1. Orthostatic performed: N/A    2. Tolerating PO medications: Yes    3. Return to near baseline physical activity: No    4. Cleared for discharge by consultants (if involved): No    Discharge Planner Nurse   Safe discharge environment identified: Yes  Barriers to discharge: Yes       Entered by: Yeimi Madsen RN 02/02/2023 9:37 PM     Please review provider order for any additional goals.   Nurse to notify provider when observation goals have been met and patient is ready for discharge.Goal Outcome Evaluation:

## 2023-02-03 NOTE — PLAN OF CARE
PRIMARY DIAGNOSIS: GENERALIZED WEAKNESS    OUTPATIENT/OBSERVATION GOALS TO BE MET BEFORE DISCHARGE  1. Orthostatic performed: N/A    2. Tolerating PO medications: Yes    3. Return to near baseline physical activity: No    4. Cleared for discharge by consultants (if involved): No    Discharge Planner Nurse   Safe discharge environment identified: No  Barriers to discharge: Yes       Entered by: Colt Ortega RN 02/03/2023 6:17 AM     Please review provider order for any additional goals.   Nurse to notify provider when observation goals have been met and patient is ready for discharge.Goal Outcome Evaluation:

## 2023-02-03 NOTE — PROGRESS NOTES
Pt refusing to eat lunch. Pt mineers out no loudly and persistantly when asking pt if she would like to eat or drink. Pt ate a few bites of late breakfast. She has had 360-480cc water this shift. Pt c/o pain with turning. Tylenol admin'd as ordered. Will medicate prior to discharge this pm. Emotional support and explanation of cares provided multiple times this shift.

## 2023-02-03 NOTE — SIGNIFICANT EVENT
"Significant Event Note    Time of event: 11:37 PM February 2, 2023    Description of event:  House paged about agitation and chest pain under right breast. When going to evaluate patient she was mostly complaining of sacral pain with associated known ulcer. After further questioning she endorses pain under right breast that \"has been present for a long time\". She is tender on palpation under right breast. No change with breathing or chest pain anywhere else. Appears to be musculoskeletal in nature. Patient has volteren gel already ordered that we will try.    In regards to agitation, she has been yelling out and pulling at lines. Will reposition in hopes this will help with some sacral pain that is likely worsening agitation. Will also give one time trazodone dose to help with sleep.    Plan:  -volteren gel  -trazadone    Discussed with: bedside nurse    Colette Torres MD    "

## 2023-02-03 NOTE — PLAN OF CARE
"PRIMARY DIAGNOSIS: \"failure to thrive \" NURSING  OUTPATIENT/OBSERVATION GOALS TO BE MET BEFORE DISCHARGE:  ADLs back to baseline: Yes    Activity and level of assistance: pt not OOB    Pain status: Improved-controlled with oral pain medications.    Return to near baseline physical activity: Yes     Discharge Planner Nurse   Safe discharge environment identified: Yes  Barriers to discharge: Yes       Entered by: Mariza Payne RN 02/03/2023 10:55 AM   Pt to have EGD this am, NPO currently.  Please review provider order for any additional goals.   Nurse to notify provider when observation goals have been met and patient is ready for discharge.Goal Outcome Evaluation:                        "

## 2023-02-03 NOTE — PLAN OF CARE
PRIMARY DIAGNOSIS: GENERALIZED WEAKNESS    OUTPATIENT/OBSERVATION GOALS TO BE MET BEFORE DISCHARGE  1. Orthostatic performed: N/A    2. Tolerating PO medications: Yes    3. Return to near baseline physical activity: No    4. Cleared for discharge by consultants (if involved): No    Discharge Planner Nurse   Safe discharge environment identified: Yes  Barriers to discharge: Yes       Entered by: Colt Ortega RN 02/03/2023 8:07 AM   Changed dressing on coccyx and Right IT  Please review provider order for any additional goals.   Nurse to notify provider when observation goals have been met and patient is ready for discharge.Goal Outcome Evaluation:

## 2023-02-03 NOTE — PLAN OF CARE
"PRIMARY DIAGNOSIS: \"failure to thrive\" NURSING  OUTPATIENT/OBSERVATION GOALS TO BE MET BEFORE DISCHARGE:  ADLs back to baseline: Yes    Activity and level of assistance: pt not OOB    Pain status: No improvement noted. Consider adjustment in pain regimen. taking tylenol and voltoren    Return to near baseline physical activity: Yes     Discharge Planner Nurse   Safe discharge environment identified: Yes  Barriers to discharge: No       Entered by: Mariza Payne RN 02/03/2023 2:58 PM   Pt to discharge to facility this pm. Explained this to pt and she will receive tylenol prior to discharge.  Please review provider order for any additional goals.   Nurse to notify provider when observation goals have been met and patient is ready for discharge.  Goal Outcome Evaluation:                        "

## 2023-02-03 NOTE — PROGRESS NOTES
[facility]    Hospitalist Progress Note    Assessment & Plan   72 year old female who was admitted on 2/1/2023 with failure to thrive -- sent in by daughter from nursing home -- wants answers why she is not eating well and loosing weight.     Impression:   Principal Problem:    Failure to thrive in adult      Hypertension      DM type 2 w Neuro/Retin/Nephr -- hgb A1C 7.6 on 1/3/23      Sacral decubitus ulcer   -- surgery having Wound care nurse enzymatically debride wound      Acute kidney injury (H)   -- start IV fluids, check BMP in AM       Plan:  Discussed with daughter -- she wants mother to get a EGD, and wants it tomorrow.  Will consult GI and make NPO at midnight. Also starting IV fluid (patient refused them yesterday -- daughter says she will convincer her to allow it).     DVT Prophylaxis: Pneumatic Compression Devices  Code Status: No CPR- Pre-arrest intubation OK    Disposition: Expected discharge in 1-2 days back to Long Term Care    Vu Coleamn MD  Pager [phone]  Cell Phone [phone]  Text Page (7am to 6pm)    Interval History   No new complaints, eating small amounts     Physical Exam   Temp: 97.4  F (36.3  C) Temp src: Oral BP: 111/59 Pulse: 85   Resp: 16 SpO2: 100 % O2 Device: None (Room air)    There were no vitals filed for this visit.  Vital Signs with Ranges  Temp:  [97.2  F (36.2  C)-98.5  F (36.9  C)] 97.4  F (36.3  C)  Pulse:  [80-88] 85  Resp:  [16-20] 16  BP: (107-130)/(51-69) 111/59  SpO2:  [100 %] 100 %  No intake/output data recorded.    # Pain Assessment:  Current Pain Score 2/2/2023   Patient currently in pain? no   Pain descriptors -   Cristal solomon pain level was assessed and she currently denies pain.        Constitutional: Awake, alert, cooperative, no apparent distress  Respiratory: Clear to auscultation bilaterally, no crackles or wheezing  Cardiovascular: Regular rate and rhythm, normal S1 and S2, and no murmur noted  GI: Normal bowel  sounds, soft, non-distended, non-tender  Extrem: No calf tenderness, no ankle edema  Neuro: Ox2, no focal motor or sensory deficits, too weak to stand    Medications     sodium chloride 100 mL/hr at 02/02/23 1910       aspirin  81 mg Oral Daily     atorvastatin  80 mg Oral QPM     dorzolamide-timolol  1 drop Both Eyes BID     [START ON 2/3/2023] DULoxetine  90 mg Oral QAM     insulin aspart  1-10 Units Subcutaneous TID AC     insulin aspart  1-7 Units Subcutaneous At Bedtime     [Held by provider] insulin glargine  20 Units Subcutaneous QAM AC     latanoprost  1 drop Both Eyes At Bedtime     metoprolol tartrate  100 mg Oral BID     [START ON 2/3/2023] pantoprazole  40 mg Oral QAM AC     polyvinyl alcohol  1 drop Both Eyes TID     senna-docusate  1 tablet Oral BID    Or     senna-docusate  2 tablet Oral BID       Data   Recent Labs   Lab 02/02/23  2044 02/02/23  1721 02/01/23  1254   WBC  --   --  6.6   HGB  --   --  12.3   MCV  --   --  98   PLT  --   --  260   NA  --   --  139   POTASSIUM  --   --  4.3   CHLORIDE  --   --  104   CO2  --   --  23   BUN  --   --  34.8*   CR  --   --  1.06*   ANIONGAP  --   --  12   OLAF  --   --  10.3*   * 136* 169*   ALBUMIN  --   --  3.6   PROTTOTAL  --   --  8.0   BILITOTAL  --   --  0.4   ALKPHOS  --   --  223*   ALT  --   --  31   AST  --   --  56*       Imaging:   No results found for this or any previous visit (from the past 24 hour(s)).

## 2023-02-03 NOTE — CONSULTS
[facility] - Digestive Health Consultation     Cristal Toney  C/O KIMBERLY TONEY  [address]  [place] [address]  72 year old female     Admission Date/Time: 2/1/2023  Primary Care Provider: Aditi Allen     We were asked to see the patient in consultation by Dr. Islas for evaluation of failure to thrive, weight loss, decreased appetite.    ASSESSMENT:    Cristal Toney is a 72 year old female with PMH of type 2 diabetes mellitus, diabetic neuropathy, CAD, CVA, hypertension, hyperlipidemia, CKD, stroke who was admitted on 2/1/23 for failure to thrive, weight loss, and pressure ulcer. GI consulted because daughter wants EGD done due to her failure to thrive and decreased oral intake.     1. Failure to thrive  2. Weight loss  3. Poor oral intake    - She has known oropharyngeal dysphagia which is likely contributing to poor dietary intake, however according to the patient's daughter, dysphagia has not been much of an issue. The patient simply does not have an appetite to eat. I suspect this decline over the past few months is related to her multiple comorbidities and age rather than an acute GI process. Recent CT abd/pelvis 01/2023 did not find any significant masses, inflammatory or obstructive changes to explain her symptoms. Current labs and vitals are unremarkable.     - Pt's daughter is demanding to have an EGD done during this admission.    Reviewing pt's chart, there is NO acute indication to have this EGD done acutely/urgently as it likely would NOT change or add to the treatment/management of this patient's chronic condition.    Despite this, we did attempt to find OR time for an EGD today, unfortunately, there wasn't any availabilities. I also called the [facility] office to see if there were sooner availabilities for her EGD unfortunately there was not.      I spoke to the daughter regarding this and she was understandably upset. She continues to feel that her mother's care is being  ignored. I tried to reiterate our rationale but she was clearly unhappy.     ADDENDUM:   I was able to speak with the daughter again. We rediscussed the plan about the outpatient EGD and she was in agreement to keep the appointment on 3/1/23. Family will focus on making sure the patient is eating at her care facility as I suspect this is the more pressing matter. They will try to implement the protein/meal replacement shakes. GI will sign off    For now, we would recommend empiric treatment of gastritis/PUD with PPI therapy until her outpatient EGD 3/1/23. Pt should continue with protein/meal shakes at least 2-3 times daily to help maintain weight.      4. Pressure ulcer  - Per wound care following        Case discussed with Dr. Perez, please review MD addendum below.    Approximately 45 minutes of total time was spent providing patient care, including patient evaluation, reviewing documentation/test results, and     Chidi Fischer PA-C  [facility]  [phone]  ________________________________________________________________________        CC: Tailbone pain     HPI:  Cristal Preciado is a 72 year old female with PMH of type 2 diabetes mellitus, diabetic neuropathy, CAD, CVA, hypertension, hyperlipidemia, CKD, stroke who was admitted on 2/1/23 for failure to thrive, weight loss, and pressure ulcer. GI consulted because daughter wants EGD done due to her failure to thrive and decreased oral intake. She is scheduled as an outpatient for the EGD on 3/1 but daughter feels this needs to be done sooner.      Pt is alert but only responds with simple answers, mainly yes/no. Majority of history was obtained through daughter.     I spoke with the daughter, pt is at a care facility and has been losing weight of 20-30 lbs. Daughter feels the patient is being ignored as she is not doing well and continues to lose weight. It seems the patient does not have an appetite and will often avoid eating unless it  "is spoon fed to her. It does not seem patient is struggling with dysphagia as long as foods are made soft/smaller. Daughter also reports that the patient often complains of abdominal or tailbone pain. At admission, pt was found with pressure injury (stage 4) of Coccyx and right IT. Wound care consulted for management. Pt had a previous CT abd/pelvis done 1/3/23 which did not find any acute abnormalities besides small pulmonary nodules. When speaking with the patient today, she did NOT have any abdominal pains even with deep palpation. No diarrhea or constipation. No bloody stools or melena.     Per chart review:  Pt was seen by [facility] in outpatient clinic in October 2022 for poor oral intake, dysphagia, and weight loss. Video swallow with SLP noted oropharyngeal dysphagia including: \"consistent moderately deep penetration with thin, mildly thick liquids. Oral motor function is slow and mildly weak, characterized by delayed and effortful AP transit, transit dysphagia, residuals and piecemeal swallow. Tongue base retraction was mild-to-moderately impaired and the swallow response was delayed. Mild stasis occurred with all intakes, but most with somewhat thicker consistencies.\"     Overall, it was recommended that she continue working with SLP for dysphagia therapy. Recommended diet: moderately thick liquids and may benefit from softer foods.   Esophageal manometry was attempted but failed because pt could not tolerate the procedure and was aborted.   EGD also recommended and is scheduled for 3/1.         ROS: A comprehensive ten point review of systems was negative aside from those in mentioned in the HPI.      PAST MEDICAL HISTORY:  Patient Active Problem List    Diagnosis Date Noted     Failure to thrive in adult 02/01/2023     Priority: Medium     Acute kidney injury (H) 02/01/2023     Priority: Medium     Dehydration 01/05/2023     Priority: Medium     Confusion 01/05/2023     Priority: Medium     Acute metabolic " encephalopathy 01/04/2023     Priority: Medium     Oropharyngeal dysphagia 01/04/2023     Priority: Medium     Sacral decubitus ulcer 01/04/2023     Priority: Medium     Chronic kidney disease, stage 2 (mild) 05/10/2021     Priority: Medium     Hypomagnesemia 08/23/2019     Priority: Medium     S/P hysterectomy 08/05/2019     Priority: Medium     Acute chest pain 04/24/2018     Priority: Medium     Neoplasm of uncertain behavior of skin 02/25/2018     Priority: Medium     Chronic dermatitis of hands 08/30/2017     Priority: Medium     Gout 12/28/2015     Priority: Medium     DM type 2 w Neuro/Retin/Nephr -- hgb A1C 7.6 on 1/3/23 12/02/2015     Priority: Medium     CKD (chronic kidney disease) 12/02/2015     Priority: Medium     Venous stasis dermatitis of both lower extremities 11/02/2015     Priority: Medium     History of coronary artery disease 06/10/2015     Priority: Medium     Dermatitis, seborrheic 05/13/2015     Priority: Medium     Eczematous dermatitis 05/13/2015     Priority: Medium     Leg weakness 05/04/2015     Priority: Medium     Tubular adenoma of colon      Priority: Medium     repeat colonoscopy in 2018       Seasonal allergies      Priority: Medium     Depression      Priority: Medium     Anemia 06/13/2012     Priority: Medium     H/O: stroke      Priority: Medium     admitted at [facility]      Priority: Medium     Cataract 04/22/2011     Priority: Medium     Utility update for deleted IMO code  Imo Update utility       CAD (coronary artery disease) 04/22/2011     Priority: Medium     Diabetic nephropathy (H) 04/22/2011     Priority: Medium     Diabetic neuropathy (H) 04/22/2011     Priority: Medium     severe       Diabetic retinopathy (H) 04/22/2011     Priority: Medium     GERD (gastroesophageal reflux disease) 04/22/2011     Priority: Medium     Glaucoma 04/22/2011     Priority: Medium     Hypertension 04/22/2011     Priority: Medium     SOCIAL HISTORY:  Social History      Tobacco Use     Smoking status: Former     Packs/day: 1.50     Years: 45.00     Pack years: 67.50     Types: Cigarettes     Start date: 1968     Quit date: 3/6/2013     Years since quittin.9     Smokeless tobacco: Never   Vaping Use     Vaping Use: Never used   Substance Use Topics     Alcohol use: Not Currently     Drug use: Never     FAMILY HISTORY:  Family History   Problem Relation Age of Onset     Hypertension Mother      Cerebrovascular Disease Mother      Glaucoma Father      Cancer Father      Diabetes Sister      Glaucoma Sister      Cancer - colorectal Other      Cerebrovascular Disease Other      Skin Cancer No family hx of      Melanoma No family hx of      Anesthesia Reaction No family hx of      Deep Vein Thrombosis (DVT) No family hx of      Arthritis Brother      Diabetes Sister      Glaucoma Sister      ALLERGIES:   Allergies   Allergen Reactions     Dust Mites Other (See Comments)     Sneezing runny eyes and nose.     Food Allergy Formula Hives     Mountain Dew and Walnuts     Pollen Extract Other (See Comments)     Sneezing runny eyes and nose.     MEDICATIONS:   Current Facility-Administered Medications   Medication     acetaminophen (TYLENOL) tablet 650 mg    Or     acetaminophen (TYLENOL) Suppository 650 mg     aspirin (ASA) chewable tablet 81 mg     atorvastatin (LIPITOR) tablet 80 mg     glucose gel 15-30 g    Or     dextrose 50 % injection 25-50 mL    Or     glucagon injection 1 mg     diclofenac (VOLTAREN) 1 % topical gel 2 g     dorzolamide-timolol (COSOPT) ophthalmic solution 1 drop     DULoxetine (CYMBALTA) DR capsule 90 mg     insulin aspart (NovoLOG) injection (RAPID ACTING)     insulin aspart (NovoLOG) injection (RAPID ACTING)     [Held by provider] insulin glargine (LANTUS PEN) injection 20 Units     latanoprost (XALATAN) 0.005 % ophthalmic solution 1 drop     melatonin tablet 1 mg     metoprolol tartrate (LOPRESSOR) tablet 100 mg     nitroGLYcerin (NITROSTAT) sublingual  "tablet 0.4 mg     ondansetron (ZOFRAN ODT) ODT tab 4 mg    Or     ondansetron (ZOFRAN) injection 4 mg     pantoprazole (PROTONIX) EC tablet 40 mg     polyethylene glycol (MIRALAX) Packet 17 g     polyvinyl alcohol (LIQUIFILM TEARS) 1.4 % ophthalmic solution 1 drop     senna-docusate (SENOKOT-S/PERICOLACE) 8.6-50 MG per tablet 1 tablet    Or     senna-docusate (SENOKOT-S/PERICOLACE) 8.6-50 MG per tablet 2 tablet     simethicone (MYLICON) chewable tablet 80 mg     sodium chloride 0.9% infusion     traZODone (DESYREL) tablet 50 mg       PHYSICAL EXAM:   /60 (BP Location: Left arm)   Pulse 69   Temp 97.6  F (36.4  C) (Oral)   Resp 16   Ht 1.702 m (5' 7\")   SpO2 99%   BMI 23.85 kg/m     GEN: Alert, oriented x3, communicative and in NAD.  ANTWAN: AT, anicteric, OP without erythema, exudate, or ulcers.    NECK: Supple.    LYMPH: No LAD noted.  HRT: RRR  LUNGS: CTA  ABD: ND, +BS, no guarding or pain to palpation, no rebound, no HSM.  SKIN: No rash, jaundice or spider angiomata  MSKL: LE free of edema, strength 5/5 all 4 extrems  NEURO: CN grossly intact, sensation intact to light touch, toes downgoing.     ADDITIONAL DATA:   I reviewed the patient's new clinical lab test results.   Recent Labs   Lab Test 02/03/23  0602 02/01/23  1254 01/06/23  1215   WBC 7.2 6.6 5.2   HGB 11.6* 12.3 10.1*   MCV 98 98 98    260 211     Recent Labs   Lab Test 02/03/23  0602 02/01/23  1254 01/09/23  0729   POTASSIUM 4.2 4.3 4.2   CHLORIDE 105 104 105   CO2 20* 23 20*   BUN 37.6* 34.8* 17.9   CR 0.96* 1.06* 0.89   ANIONGAP 16* 12 14     Recent Labs   Lab Test 02/01/23  1254 01/04/23  0324 01/03/23  2315 01/03/23  2250 11/27/20  0456 09/11/19  1244 08/06/19  0835   ALBUMIN 3.6 3.4*  --  3.3*   < >  --   --    BILITOTAL 0.4 0.3  --  0.3   < >  --   --    ALT 31 55*  --  60*   < >  --   --    AST 56* 69*  --  76*   < >  --   --    PROTEIN  --   --  20*  --   --  Negative 30*    < > = values in this interval not displayed.      " "  IMAGING:  I reviewed the patient's new imaging results.          ADDENDUM  Cristal was seen and examined with Chidi Fischer PAC. I agree with his assessment and Plan  71 yo woman with hx of CAD, CVA, diabetes with neuropathy, COVID several months ago after which she has had low appetite and weight loss. Severe decubitus ulcer.    GI was consulted due to failure to thrive and poor appetite/weight loss.  EGD is scheduled 4 weeks as outpatient. Daughter of patient \"demanding\" that EGD be done \"now\".    EGD for above indication is not urgent. Outpatient management is appropriate.  That being said, I did attempt to schedule EGD today and was told by OR that procedure would not be able to be scheduled until \"tonight\" and asked whether the procedure was urgent to which I responded honestly that it was not.     Moreover, VSS performed several months ago suggests there is aspiration and penetration. Do not have a high index of suspicion that an EGD will change anything with ongoing medical decline/process.    Spoke with Dr. Islas about case and patient does not have medical reason to remain hospitalized.   Chidi Fischer spoke with daughter of patient and explained rationale above. Daughter expressed understanding.  I called daughter of patient and it went to  and VM is \"full\" so could not leave message.    Gi will sign off  Outpatient EGD is appropriate.  Thank you         "

## 2023-02-06 NOTE — TELEPHONE ENCOUNTER
I spoke with the [facility] center who will fax over her MAR , BS data. They tell me for a couple of days now she has been dropping   In 59-84 in the evening. I scheduled her to be seen 11/16/20 in clinic  but they are to fax today  her information to see if any changes need to be made before then Chelo Marcelo RN on 11/3/2020 at 3:20 PM    
[facility] Call Center    Phone Message    May a detailed message be left on voicemail: yes     Reason for Call: Other: Rosalva states the pt's blood sugars last night at 5:00pm were 59 and at bedtime were 71.  This morning the Pt's blood sugars were 123 at 7:00am and 159 at 11:30am before lunch.  Please follow up if needed at [phone]     Action Taken: Message routed to:  [facility] ENDO    Travel Screening: Not Applicable                                                                      
Message left for nurse at care center and  New orders for Toujeo U300 to be reduced from 90 units daily to 80 units daily . Chelo Marcelo RN on 11/4/2020 at 2:14 PM    
07-Feb-2023 03:08

## 2023-02-08 PROBLEM — R41.0 DELIRIUM: Status: ACTIVE | Noted: 2022-01-01

## 2023-02-08 PROBLEM — N30.01 ACUTE CYSTITIS WITH HEMATURIA: Status: ACTIVE | Noted: 2022-01-01

## 2023-02-09 PROBLEM — E46 PROTEIN-CALORIE MALNUTRITION (H): Status: ACTIVE | Noted: 2023-01-01

## 2023-02-09 NOTE — PROGRESS NOTES
"[facility] GERIATRICS  REGULATORY VISIT  February 9, 2023      [facility] Medical Record Number:  [id number]  Place of Service where encounter took place:  [facility] () [[id number]]    Chief Complaint   Patient presents with     prison Regulatory       HPI:    Cristal Preciado is a 72 year old  (1950), who is being seen today for a federally mandated E/M visit at [facility] where she has resided since December 2022 - she moved here from [facility] where she had resided since October 2011. She shares a room with her daughter. HPI information obtained from: facility chart records, facility staff, patient report, [facility] chart review and Care Everywhere [facility] chart review.    Today, Ms. Preciado is seen sitting in wheelchair next to her daughter in the common room.  Since I last saw her in December she has had 3 hospitalizations:  -- [facility] late Dec with delirium, asx positive COVID   -- [facility] 1/3/23 to 1/7/23 -- ongoing encephalopathy with negative MRI brain and EEG with nonspecific generalized cerebral dysfunction. Ultimately felt secondary to recent COVID infection  -- [facility] 2/1/23 to 2/3/23 -- FTT    She reports no aches or pains. Reviewed VS with her as well as asked how she things April is doing -- she said \"OK\".     ALLERGIES:    Allergies   Allergen Reactions     Dust Mites Other (See Comments)     Sneezing runny eyes and nose.     Food Allergy Formula Hives     Mountain Dew and Walnuts     Pollen Extract Other (See Comments)     Sneezing runny eyes and nose.        Past Medical, Surgical, Family and Social History: Reviewed and updated in EPIC.    MEDICATIONS:  Post Discharge Medication Reconciliation Status: discharge medications reconciled and changed, per note/orders.   Current Outpatient Medications   Medication Sig Dispense Refill     acetaminophen (TYLENOL) 325 MG tablet Take 2 tablets (650 mg) by mouth every 6 hours as needed for mild " pain or other (and adjunct with moderate or severe pain or per patient request)  0     ASPIRIN LOW DOSE 81 MG chewable tablet Take 81 mg by mouth daily  99     atorvastatin (LIPITOR) 80 MG tablet Take 1 tablet by mouth daily. Pt needs to have labs done prior to further refills. 90 tablet 0     bisacodyl (DULCOLAX) 10 MG suppository Place 10 mg rectally daily as needed for constipation       diclofenac (VOLTAREN) 1 % topical gel Apply 2 g topically 4 times daily       dorzolamide-timolol (COSOPT) 2-0.5 % ophthalmic solution Place 1 drop into both eyes 2 times daily 10 mL 3     DULoxetine (CYMBALTA) 30 MG capsule Take 90 mg by mouth every morning        insulin glargine (LANTUS PEN) 100 UNIT/ML pen Inject 20 Units Subcutaneous every morning (before breakfast) 15 mL      insulin lispro (HUMALOG KWIKPEN) 100 UNIT/ML (1 unit dial) KWIKPEN Inject Subcutaneous 3 times daily (before meals) 140-189=1 unit  190-239= 2 units  240-289= 3 units  290-339= 4 units  340-399= 5 units  400-999= 6 units       ketoconazole (NIZORAL) 2 % shampoo To entire wet scalp and ears and then wash off after 5 minutes three times a week. (Patient taking differently: To entire wet scalp and ears and then wash off after 5 minutes Tuesday and Friday) 240 mL 11     latanoprost (XALATAN) 0.005 % ophthalmic solution Place 1 drop into both eyes At Bedtime 5 mL 2     liraglutide (VICTOZA PEN) 18 MG/3ML solution Inject subcu 1.2mg daily. 9 mL 1     loperamide (IMODIUM A-D) 2 MG tablet Take 2 mg by mouth 4 times daily as needed for diarrhea       loratadine (CLARITIN) 10 MG tablet Take 10 mg by mouth as needed.       melatonin 3 MG tablet Take 3 mg by mouth At Bedtime       metFORMIN (GLUCOPHAGE XR) 500 MG 24 hr tablet Take 1 tablet (500 mg) by mouth daily (with dinner) 180 tablet 3     metoprolol tartrate (LOPRESSOR) 100 MG tablet Take 100 mg by mouth 2 times daily       nitroGLYCERIN (NITROSTAT) 0.4 MG SL tablet Place 0.4 mg under the tongue every 5  "minutes as needed.       olopatadine (PATADAY) 0.2 % ophthalmic solution Place 1 drop into both eyes daily For itchy eyes 2.5 mL 3     omeprazole (PRILOSEC) 20 MG DR capsule Take 2 capsules (40 mg) by mouth daily  0     polyvinyl alcohol (LIQUIFILM TEARS) 1.4 % ophthalmic solution Place 1 drop into both eyes 3 times daily       senna-docusate (SENOKOT-S/PERICOLACE) 8.6-50 MG tablet Take 2 tablets by mouth 2 times daily as needed for constipation 60 tablet 0     simethicone (MYLICON) 125 MG chewable tablet Take 125 mg by mouth 4 times daily as needed After meals and HS prn       traZODone (DESYREL) 50 MG tablet Take 1 tablet (50 mg) by mouth At Bedtime  0     White Petrolatum-Mineral Oil (REFRESH P.M.) OINT Apply to eye nightly as needed       Medications reviewed:  Medications reconciled to facility chart and changes were made to reflect current medications as identified as above med list. Below are the changes that were made:   Medications stopped since last EPIC medication reconciliation:   Medications Discontinued During This Encounter   Medication Reason     hydrocortisone, Perianal, (HYDROCORTISONE) 2.5 % cream Med Rec(No AVS / No eCancel)     Medications started since last [facility] medication reconciliation:  No orders of the defined types were placed in this encounter.      REVIEW OF SYSTEMS:  Unable to be obtained due to cognitive impairment or aphasia. [facility] 11/15    PHYSICAL EXAM:  /76   Pulse 72   Temp 97  F (36.1  C)   Resp 18   Ht 1.702 m (5' 7\")   Wt 62.1 kg (137 lb)   SpO2 93%   BMI 21.46 kg/m    Gen: sitting in wheelchair, alert, cooperative and in no acute distress  Resp: breathing non labored, no tachypnea  Ext: Rooke boots in place  Neuro: CX II-XII grossly in tact  Psych: memory, judgement and insight impaired    LABS/IMAGING: Reviewed as per Epic and/or [facility]    ASSESSMENT / PLAN:    DM, Type II  Hgb A1c 7.6 Sugars 170s-250s (am), 160s-240s (nooon), 140s-170s (carmen) and 130s-170 " (hs)  -- glargine 20 units in the morning plus lispro sliding scale insulin TID AC   -- liraglutide 1.2 mg subQ daily  -- metformin 500 mg daily with dinner   -- follow sugars, A1c    HTN  SBPs 110s-120s. HR 60s-70s. BMP OK in Sept.   -- metoprolol 100 mg BID   -- follow BPs and adjust medications as needed    Peripheral Neuropathy  Chronic Pain Syndrome  Underlying DM. No longer on gabapentin or pregabalin. Reports no pain today  -- APAP 650 mg q6h PRN, duloxetine 90 mg daily    Electronically signed by  Tami Orourke MD

## 2023-02-09 NOTE — LETTER
"    2/9/2023        RE: Cristal Preciado  [address]  [address]  [place] [address]        [facility] GERIATRICS  REGULATORY VISIT  February 9, 2023      [facility] Medical Record Number:  [id number]  Place of Service where encounter took place:  [facility] () [[id number]]    Chief Complaint   Patient presents with     [facility] Regulatory       HPI:    Cristal Preciado is a 72 year old  (1950), who is being seen today for a federally mandated E/M visit at [facility] where she has resided since December 2022 - she moved here from [facility] where she had resided since October 2011. She shares a room with her daughter. HPI information obtained from: facility chart records, facility staff, patient report, [facility] chart review and Care Everywhere [facility] chart review.    Today, Ms. Preciado is seen sitting in wheelchair next to her daughter in the common room.  Since I last saw her in December she has had 3 hospitalizations:  -- [facility] late Dec with delirium, asx positive COVID   -- [facility] 1/3/23 to 1/7/23 -- ongoing encephalopathy with negative MRI brain and EEG with nonspecific generalized cerebral dysfunction. Ultimately felt secondary to recent COVID infection  -- [facility] 2/1/23 to 2/3/23 -- FTT    She reports no aches or pains. Reviewed VS with her as well as asked how she things April is doing -- she said \"OK\".     ALLERGIES:    Allergies   Allergen Reactions     Dust Mites Other (See Comments)     Sneezing runny eyes and nose.     Food Allergy Formula Hives     Mountain Dew and Walnuts     Pollen Extract Other (See Comments)     Sneezing runny eyes and nose.        Past Medical, Surgical, Family and Social History: Reviewed and updated in EPIC.    MEDICATIONS:  Post Discharge Medication Reconciliation Status: discharge medications reconciled and changed, per note/orders.   Current Outpatient Medications   Medication Sig " Dispense Refill     acetaminophen (TYLENOL) 325 MG tablet Take 2 tablets (650 mg) by mouth every 6 hours as needed for mild pain or other (and adjunct with moderate or severe pain or per patient request)  0     ASPIRIN LOW DOSE 81 MG chewable tablet Take 81 mg by mouth daily  99     atorvastatin (LIPITOR) 80 MG tablet Take 1 tablet by mouth daily. Pt needs to have labs done prior to further refills. 90 tablet 0     bisacodyl (DULCOLAX) 10 MG suppository Place 10 mg rectally daily as needed for constipation       diclofenac (VOLTAREN) 1 % topical gel Apply 2 g topically 4 times daily       dorzolamide-timolol (COSOPT) 2-0.5 % ophthalmic solution Place 1 drop into both eyes 2 times daily 10 mL 3     DULoxetine (CYMBALTA) 30 MG capsule Take 90 mg by mouth every morning        insulin glargine (LANTUS PEN) 100 UNIT/ML pen Inject 20 Units Subcutaneous every morning (before breakfast) 15 mL      insulin lispro (HUMALOG KWIKPEN) 100 UNIT/ML (1 unit dial) KWIKPEN Inject Subcutaneous 3 times daily (before meals) 140-189=1 unit  190-239= 2 units  240-289= 3 units  290-339= 4 units  340-399= 5 units  400-999= 6 units       ketoconazole (NIZORAL) 2 % shampoo To entire wet scalp and ears and then wash off after 5 minutes three times a week. (Patient taking differently: To entire wet scalp and ears and then wash off after 5 minutes Tuesday and Friday) 240 mL 11     latanoprost (XALATAN) 0.005 % ophthalmic solution Place 1 drop into both eyes At Bedtime 5 mL 2     liraglutide (VICTOZA PEN) 18 MG/3ML solution Inject subcu 1.2mg daily. 9 mL 1     loperamide (IMODIUM A-D) 2 MG tablet Take 2 mg by mouth 4 times daily as needed for diarrhea       loratadine (CLARITIN) 10 MG tablet Take 10 mg by mouth as needed.       melatonin 3 MG tablet Take 3 mg by mouth At Bedtime       metFORMIN (GLUCOPHAGE XR) 500 MG 24 hr tablet Take 1 tablet (500 mg) by mouth daily (with dinner) 180 tablet 3     metoprolol tartrate (LOPRESSOR) 100 MG tablet  "Take 100 mg by mouth 2 times daily       nitroGLYCERIN (NITROSTAT) 0.4 MG SL tablet Place 0.4 mg under the tongue every 5 minutes as needed.       olopatadine (PATADAY) 0.2 % ophthalmic solution Place 1 drop into both eyes daily For itchy eyes 2.5 mL 3     omeprazole (PRILOSEC) 20 MG DR capsule Take 2 capsules (40 mg) by mouth daily  0     polyvinyl alcohol (LIQUIFILM TEARS) 1.4 % ophthalmic solution Place 1 drop into both eyes 3 times daily       senna-docusate (SENOKOT-S/PERICOLACE) 8.6-50 MG tablet Take 2 tablets by mouth 2 times daily as needed for constipation 60 tablet 0     simethicone (MYLICON) 125 MG chewable tablet Take 125 mg by mouth 4 times daily as needed After meals and HS prn       traZODone (DESYREL) 50 MG tablet Take 1 tablet (50 mg) by mouth At Bedtime  0     White Petrolatum-Mineral Oil (REFRESH P.M.) OINT Apply to eye nightly as needed       Medications reviewed:  Medications reconciled to facility chart and changes were made to reflect current medications as identified as above med list. Below are the changes that were made:   Medications stopped since last EPIC medication reconciliation:   Medications Discontinued During This Encounter   Medication Reason     hydrocortisone, Perianal, (HYDROCORTISONE) 2.5 % cream Med Rec(No AVS / No eCancel)     Medications started since last [facility] medication reconciliation:  No orders of the defined types were placed in this encounter.      REVIEW OF SYSTEMS:  Unable to be obtained due to cognitive impairment or aphasia. [facility] 11/15    PHYSICAL EXAM:  /76   Pulse 72   Temp 97  F (36.1  C)   Resp 18   Ht 1.702 m (5' 7\")   Wt 62.1 kg (137 lb)   SpO2 93%   BMI 21.46 kg/m    Gen: sitting in wheelchair, alert, cooperative and in no acute distress  Resp: breathing non labored, no tachypnea  Ext: Rooke boots in place  Neuro: CX II-XII grossly in tact  Psych: memory, judgement and insight impaired    LABS/IMAGING: Reviewed as per Epic and/or " [facility]    ASSESSMENT / PLAN:    DM, Type II  Hgb A1c 7.6 Sugars 170s-250s (am), 160s-240s (nooon), 140s-170s (carmen) and 130s-170 (hs)  -- glargine 20 units in the morning plus lispro sliding scale insulin TID AC   -- liraglutide 1.2 mg subQ daily  -- metformin 500 mg daily with dinner   -- follow sugars, A1c    HTN  SBPs 110s-120s. HR 60s-70s. BMP OK in Sept.   -- metoprolol 100 mg BID   -- follow BPs and adjust medications as needed    Peripheral Neuropathy  Chronic Pain Syndrome  Underlying DM. No longer on gabapentin or pregabalin. Reports no pain today  -- APAP 650 mg q6h PRN, duloxetine 90 mg daily    Electronically signed by  Tami Orourke MD                Sincerely,        Tami Orourke MD

## 2023-02-16 PROBLEM — A41.9 SEVERE SEPSIS (H): Status: ACTIVE | Noted: 2023-01-01

## 2023-02-16 PROBLEM — L08.9 WOUND INFECTION: Status: ACTIVE | Noted: 2023-01-01

## 2023-02-16 PROBLEM — S31.000A SACRAL WOUND, INITIAL ENCOUNTER: Status: ACTIVE | Noted: 2023-01-01

## 2023-02-16 PROBLEM — R65.20 SEVERE SEPSIS (H): Status: ACTIVE | Noted: 2023-01-01

## 2023-02-16 PROBLEM — T14.8XXA WOUND INFECTION: Status: ACTIVE | Noted: 2023-01-01

## 2023-02-16 NOTE — ED NOTES
EMERGENCY DEPARTMENT SIGN OUT NOTE        ED COURSE AND MEDICAL DECISION MAKING  Patient was signed out to me by Dr Marquise Coley at 2:34 PM   6:56 PM The patients daughter have not arrive yet. Attempted to call multiple family member of the patient with no answer. Placed a CT finding for the patient.   6:59 PM Spoke with the patient daughter Court. [phone]  7:09 PM Spoke with Dr. Barbie Aguilar of General Surgery.   7:28 PM Spoke with hospitalist       In brief, Cristal Preciado is a 72 year old female who initially presented for wound infection.  Patient presents from a [facility] center for chronic wounds. Daughter called, but it is unclear who is her medical decision maker. Nurse notes the patient is not alert and oriented x4. She is currently at her baseline. Provider at the care center sent the patient here for wound debridement of her chronic wounds. Patient states she is here because of the wounds on her bottom. They have not changed, but they hurt. She denies fevers. Patient reports vomiting, and states that is different from her normal. Patient is oriented to place, but not the year or month.    At time of sign out, disposition was pending lab testing.    After signout, patient started to have return of labs which looked significantly concerning for systemic infection with an elevated white blood cell count of 14, significantly elevated ESR and CRP as well as lactic acidosis initially greater than 6.  She has already had full cultures obtained and we started interventions for concern for sepsis.  This included a 2 L IV fluid bolus totaling more than 30 cc/kg ideal body weight.  Her lactate did improve to 3.6 indicating fluid responsiveness and IV fluids will be continued at this point.  After all cultures were obtained she was started on broad-spectrum antibiotics with vancomycin and Zosyn initially.  Decided to proceed with CT abdomen pelvis to further evaluate extent of her wounds and this did appear  concerning for some subcutaneous emphysema, possibly necrotizing fasciitis related to her sacral wound.  Clindamycin was added to her antibiotic regimen and I discussed the case with general surgery.  I also reconvened with the patient's daughter Court.  We discussed the severity of her current condition and the need to admit her at a capable facility as soon as possible to have a surgery consult and continue aggressive management of soft tissue infection and sepsis.  Patient was understanding of this and was comfortable with her getting admitted here at [facility].  Discussed case with hospitalist, general surgery will review imaging and media and consult on her care as soon as possible. Patient otherwise remaining stable so far in her clinical course and will be admitted to the floor.        FINAL IMPRESSION    1. Sacral wound, initial encounter    2. Wound infection    3. Severe sepsis (H)        ED MEDS  Medications   lactated ringers infusion (has no administration in time range)   lidocaine 1 % 0.1-1 mL (has no administration in time range)   lidocaine (LMX4) cream (has no administration in time range)   sodium chloride (PF) 0.9% PF flush 3 mL (3 mLs Intracatheter Given 2/16/23 2148)   sodium chloride (PF) 0.9% PF flush 3 mL (has no administration in time range)   melatonin tablet 1 mg (has no administration in time range)   Patient is already receiving mechanical prophylaxis (has no administration in time range)   sodium chloride 0.9% infusion ( Intravenous New Bag 2/16/23 2148)   piperacillin-tazobactam (ZOSYN) 3.375 g vial to attach to  mL bag (3.375 g Intravenous Given 2/16/23 2159)   clindamycin (CLEOCIN) infusion 900 mg (has no administration in time range)   glucose gel 15-30 g (has no administration in time range)     Or   dextrose 50 % injection 25-50 mL (has no administration in time range)     Or   glucagon injection 1 mg (has no administration in time range)   insulin aspart (NovoLOG)  injection (RAPID ACTING) (3 Units Subcutaneous Given 2/16/23 2153)   vancomycin (VANCOCIN) 1,250 mg in sodium chloride 0.9 % 250 mL intermittent infusion (has no administration in time range)   lactated ringers BOLUS 2,000 mL (0 mLs Intravenous Stopped 2/16/23 1904)   piperacillin-tazobactam (ZOSYN) 3.375 g vial to attach to  mL bag (0 g Intravenous Stopped 2/16/23 1639)   vancomycin (VANCOCIN) 1,250 mg in sodium chloride 0.9 % 250 mL intermittent infusion (0 mg Intravenous Stopped 2/16/23 1904)   iopamidol (ISOVUE-370) solution 100 mL (100 mLs Intravenous Given 2/16/23 1832)   clindamycin (CLEOCIN) infusion 900 mg (0 mg Intravenous Stopped 2/16/23 2003)   lactated ringers BOLUS 1,000 mL (1,000 mLs Intravenous New Bag 2/16/23 1928)   insulin aspart (NovoLOG) injection (RAPID ACTING) (8 Units Subcutaneous Given 2/16/23 2000)       LAB  Labs Ordered and Resulted from Time of ED Arrival to Time of ED Departure   GLUCOSE BY METER - Abnormal       Result Value    GLUCOSE BY METER POCT 358 (*)    COMPREHENSIVE METABOLIC PANEL - Abnormal    Sodium 132 (*)     Potassium 4.2      Chloride 93 (*)     Carbon Dioxide (CO2) 20 (*)     Anion Gap 19 (*)     Urea Nitrogen 34.6 (*)     Creatinine 0.93      Calcium 10.3 (*)     Glucose 440 (*)     Alkaline Phosphatase 318 (*)     AST 54 (*)     ALT 36 (*)     Protein Total 8.0      Albumin 2.9 (*)     Bilirubin Total 0.8      GFR Estimate 66     LACTIC ACID WHOLE BLOOD - Abnormal    Lactic Acid 6.4 (*)    CRP INFLAMMATION - Abnormal    CRP Inflammation 337.70 (*)    ERYTHROCYTE SEDIMENTATION RATE AUTO - Abnormal    Erythrocyte Sedimentation Rate 118 (*)    ROUTINE UA WITH MICROSCOPIC REFLEX TO CULTURE - Abnormal    Color Urine Yellow      Appearance Urine Cloudy (*)     Glucose Urine 500 (*)     Bilirubin Urine Negative      Ketones Urine Negative      Specific Gravity Urine 1.029      Blood Urine 0.1 mg/dL (*)     pH Urine 5.0      Protein Albumin Urine 20 (*)     Urobilinogen  Urine <2.0      Nitrite Urine Negative      Leukocyte Esterase Urine 500 Summer/uL (*)     Bacteria Urine Many (*)     WBC Clumps Urine Present (*)     RBC Urine 29 (*)     WBC Urine 31 (*)    CBC WITH PLATELETS AND DIFFERENTIAL - Abnormal    WBC Count 14.1 (*)     RBC Count 3.50 (*)     Hemoglobin 10.5 (*)     Hematocrit 32.9 (*)     MCV 94      MCH 30.0      MCHC 31.9      RDW 13.5      Platelet Count 369      % Neutrophils 81      % Lymphocytes 8      % Monocytes 10      % Eosinophils 0      % Basophils 0      % Immature Granulocytes 1      NRBCs per 100 WBC 0      Absolute Neutrophils 11.6 (*)     Absolute Lymphocytes 1.1      Absolute Monocytes 1.3      Absolute Eosinophils 0.0      Absolute Basophils 0.0      Absolute Immature Granulocytes 0.1      Absolute NRBCs 0.0     LACTIC ACID WHOLE BLOOD - Abnormal    Lactic Acid 3.6 (*)    GLUCOSE BY METER - Abnormal    GLUCOSE BY METER POCT 343 (*)    GLUCOSE BY METER - Abnormal    GLUCOSE BY METER POCT 324 (*)    GLUCOSE MONITOR NURSING POCT   GLUCOSE MONITOR NURSING POCT   GLUCOSE MONITOR NURSING POCT   BLOOD CULTURE   BLOOD CULTURE   AEROBIC BACTERIAL CULTURE ROUTINE   AEROBIC BACTERIAL CULTURE ROUTINE   URINE CULTURE   URINE CULTURE       RADIOLOGY    CT Pelvis Soft Tissue w Contrast   Final Result   Abnormal   IMPRESSION:   1.  New sacral decubitus ulcer with subcutaneous emphysema extending into the surrounding soft tissues. This could represent necrotizing fasciitis.   2.  A new right greater trochanter decubitus ulcer.   3.  Cystitis.         [Critical Result: Necrotizing fasciitis]      Finding was identified on 2/16/2023 6:35 PM.       Dr. Singer was contacted by me on 2/16/2023 6:42 PM and verbalized understanding of the critical result.              DISCHARGE MEDS  Current Discharge Medication List          I, Nicolette Her, am serving as a scribe to document services personally performed by Waylon Singer M.D , based on my observations and the provider's  statements to me.  I, Waylon Singer M.D, attest that Nicolette Her is acting in a scribe capacity, has observed my performance of the services and has documented them in accordance with my direction.    Waylon Singer M.D   [facility] EMERGENCY DEPARTMENT  [address] [place] [id number]  [phone]     Waylon Singer MD  02/16/23 9905

## 2023-02-16 NOTE — PHARMACY-VANCOMYCIN DOSING SERVICE
Pharmacy Vancomycin Initial Note  Date of Service 2023  Patient's  1950  72 year old, female    Indication: Sepsis and Skin and Soft Tissue Infection    Current estimated CrCl = Estimated Creatinine Clearance: 53.5 mL/min (A) (based on SCr of 0.96 mg/dL (H)).    Creatinine for last 3 days  No results found for requested labs within last 72 hours.    Recent Vancomycin Level(s) for last 3 days  No results found for requested labs within last 72 hours.      Vancomycin IV Administrations (past 72 hours)      No vancomycin orders with administrations in past 72 hours.                Nephrotoxins and other renal medications (From now, onward)    Start     Dose/Rate Route Frequency Ordered Stop    23 1500  piperacillin-tazobactam (ZOSYN) 3.375 g vial to attach to  mL bag         3.375 g  over 30 Minutes Intravenous ONCE 23 1456      23 1500  vancomycin (VANCOCIN) 1,250 mg in sodium chloride 0.9 % 250 mL intermittent infusion         1,250 mg  over 90 Minutes Intravenous ONCE 23 1458            Contrast Orders - past 72 hours (72h ago, onward)    None            Plan:  1. Vancomycin  1250 mg IV once.   2. Please re-consult pharmacist if vancomycin is to continue inpatient    Sejal Trammell, PharmD, BCPS 23 2:59 PM          Unable to assess

## 2023-02-16 NOTE — ED NOTES
Pt seems somewhat out of it. She was complaining of pain in her bottom due to her position in bed. Pt was repositioned by two RN's. Pt continues to yell loudly.

## 2023-02-16 NOTE — ED PROVIDER NOTES
EMERGENCY DEPARTMENT ENCOUNTER      NAME: Cristal Preciado  AGE: 72 year old female  YOB: 1950  MRN: [id number]  EVALUATION DATE & TIME: 2/16/2023 10:03 AM    PCP: Aditi Allen    ED PROVIDER: Marquise Coley M.D.      Chief Complaint   Patient presents with     Wound Infection         FINAL IMPRESSION:  1. Pressure injury of left buttock, unstageable (H)    2. Pressure injury of right hip, unstageable (H)          ED COURSE & MEDICAL DECISION MAKING:    10:45 AM I met with the patient, obtained history, performed an initial exam, and discussed options and plan for diagnostics and treatment here in the ED.   11:40 AM Repeat exam unchanged. Daughter is still not present at bedside.  11:50 AM performed wound inspection with nurse at bedside.  12:01 PM Per nursing report, nurse spoke with daughter on the phone, who says she is unable to communicate until 12:30 PM.  12:23 PM I spoke with patient relations. They will check if the patient has a surrogate decision maker.  1:00 PM I spoke with family on the phone.  1:30 PM I discussed the care plan with the patient and nurse, final disposition will be dependent on the conversation with daughter and patient.      Pertinent Labs & Imaging studies reviewed. (See chart for details)  72 year old female presents to the Emergency Department for evaluation of wound evaluation. Patient appears non toxic with stable vitals signs, patient afebrile with no hypoxia, slight tachycardia at triage but regular rate on my exam.  Abdomen is benign and lungs are clear, no gross signs of trauma.  Patient has 2 chronic appearing wounds with no surrounding erythema, did note foul smell, 1 wound is to the left buttock the other is right hip.  There was initial concern for social situation, daughter of patient called in stating that she did not want the daughter seen at all at [facility].  There was delay in laboratory draw as concerned that patient's family and/or patient did  not want any type of interventions at all.  I did perform medical screening exam, vitals otherwise reassuring and patient appeared well and comfortable.  Multiple calls made to patient's family, was finally able to discuss patient case with the daughter Court over the phone.  In our discussion the daughter states that she had a poor interaction with the hospital service at this hospital and did not want the patient admitted to this hospital but she was amenable to our ER evaluation and work-up and if admission was deemed necessary we will attempt transfer to outside facility that both patient and daughter feel is acceptable.  Concern for wound infection secondary to foul smell but again no surrounding erythema, no subcutaneous emphysema, certainly nothing suggest necrotizing fasciitis, no fever, considered but overall low suspicion for sepsis.  We will obtain screening labs, wound and blood cultures and urine studies.  At time of this dictation labs are pending.  Final disposition will be depend upon the pending studies and the patient's clinical trajectory.  Patient signed out to the [facility] physician.      Medical Decision Making    History:    Supplemental history from: Documented in chart, if applicable    External Record(s) reviewed: Documented in chart, if applicable.    Work Up:    Chart documentation includes differential considered and any EKGs or imaging independently interpreted by provider, where specified.    In additional to work up documented, I considered the following work up: Documented in chart, if applicable.    External consultation:    Discussion of management with another provider: Documented in chart, if applicable    Complicating factors:    Care impacted by chronic illness: Other: chronic wounds    Care affected by social determinants of health: Other: Concern family did not want care at [facility]    Disposition considerations: Admission considered. Patient was signed out to  the oncoming physician, disposition pending.          At the conclusion of the encounter I discussed the results of all of the tests and the disposition. The questions were answered and return precautions provided. The patient or family acknowledged understanding and was agreeable with the care plan.         MEDICATIONS GIVEN IN THE EMERGENCY:  Medications - No data to display    NEW PRESCRIPTIONS STARTED AT TODAY'S ER VISIT  New Prescriptions    No medications on file            =================================================================    HPI    Patient information was obtained from: nurse and patient    Use of Intrepreter: N/A          Cristal Preciado is a 72 year old female with a pertinent history of type 2 diabetes mellitus, diabetic neuropathy, CAD, CVA, hypertension, hyperlipidemia, CKD, stroke, who presents for wound infection.    Per nurse, patient presents from a [facility] for chronic wounds. Daughter called, but it is unclear who is her medical decision maker. Nurse notes the patient is not alert and oriented x4. She is currently at her baseline. Provider at the [facility] center sent the patient here for wound debridement of her chronic wounds.    Patient states she is here because of the wounds on her bottom. They have not changed, but they hurt. She denies fevers. Patient reports vomiting, and states that is different from her normal. Patient is oriented to place, but not the year or month.    Per chart review: Patient was seen at [facility] on 12/29/2022 with acute confusion and a history of mild cognitive impairment. She was found to have acute encephalopathy, likely infectious. Mentation was improved with treatment of infection and hydration.    2/9/2023, Dr. Libertad Orourke evaluated the patient at the [facility] as federally mandated. Since the last visit, patient was hospitalized 3 times for delirium and encephalopathy.    REVIEW OF SYSTEMS   Constitutional:  Denies fever,  chills  Respiratory:  Denies productive cough or increased work of breathing  Cardiovascular:  Denies chest pain, palpitations  GI:  Denies abdominal pain, or change in bowel or bladder habits. Positive for vomiting.  Musculoskeletal:  Denies any new muscle/joint swelling  Skin:  Denies rash. Positive for wound.  Neurologic:  Denies focal weakness  All systems negative except as marked.     PAST MEDICAL HISTORY:  Past Medical History:   Diagnosis Date     AION (anterior ischaemic optic neuropathy), left eye     NAION LE     CAD (coronary artery disease) 3/2009    [facility]; Angio  UM- normal coronary arteries     Cataract      CVA (cerebral vascular accident) (H)     admitted at [facility]     on Cymbalta     Diabetes mellitus, type 2 (H)      Diabetic nephropathy (H)      Diabetic neuropathy (H)     severe     Diabetic retinopathy (H)      GERD (gastroesophageal reflux disease)      Hyperlipidemia      Hypertension     ECHO , TDS, NL EF     Infection due to 2019 novel coronavirus 2023     POAG (primary open-angle glaucoma)     adv BE     Seasonal allergies      Tubular adenoma of colon     repeat colonoscopy in        PAST SURGICAL HISTORY:  Past Surgical History:   Procedure Laterality Date     CARDIAC CATHETERIZATION       CATARACT EXTRACTION W/ INTRAOCULAR LENS IMPLANT Bilateral       SECTION        SECTION       COLONOSCOPY  7/15/2013    Tubular adenoma; repeat in 2018;Procedure: COMBINED COLONOSCOPY, SINGLE BIOPSY/POLYPECTOMY BY BIOPSY;;  Surgeon: Don King MD;  Tubular adenoma     COLONOSCOPY N/A 2020    Procedure: COLONOSCOPY;  Surgeon: Sid Amanda MD;  Location:  GI     CORONARY STENT PLACEMENT  2004    RCA     DAVINCI HYSTERECTOMY TOTAL, BILATERAL SALPINGO-OOPHORECTOMY, COMBINED N/A 2019    Procedure: DaVinci Assisted Total Laparoscopic Hysterectomy, Removal Of Both Tubes And Ovaries;  Surgeon: Walker  Linh BRAVO MD;  Location: [facility] OR     EXTRACAPSULAR CATARACT EXTRATION WITH INTRAOCULAR LENS IMPLANT  11-10-09, 2-9-10    11-10-09 Lt, 2-9-10 Rt; Left eye 8/2012     HYSTERECTOMY TOTAL ABDOMINAL, BILATERAL SALPINGO-OOPHORECTOMY, COMBINED  08/05/2019    Procedure: DaVinci Assisted Total Laparoscopic Hysterectomy, Removal Of Both Tubes And Ovaries; Surgeon: Linh Cardoso MD; Location: [facility] OR       STENT, CORONARY, DELORES  2009    RCA         CURRENT MEDICATIONS:    Prior to Admission medications    Medication Sig Start Date End Date Taking? Authorizing Provider   acetaminophen (TYLENOL) 325 MG tablet Take 2 tablets (650 mg) by mouth every 6 hours as needed for mild pain or other (and adjunct with moderate or severe pain or per patient request) 2/3/23   Vu Islas MD   ASPIRIN LOW DOSE 81 MG chewable tablet Take 81 mg by mouth daily 6/12/19   Reported, Patient   atorvastatin (LIPITOR) 80 MG tablet Take 1 tablet by mouth daily. Pt needs to have labs done prior to further refills. 10/13/11   Edward Rose MD   bisacodyl (DULCOLAX) 10 MG suppository Place 10 mg rectally daily as needed for constipation    Reported, Patient   diclofenac (VOLTAREN) 1 % topical gel Apply 2 g topically 4 times daily    Reported, Patient   dorzolamide-timolol (COSOPT) 2-0.5 % ophthalmic solution Place 1 drop into both eyes 2 times daily 6/28/22   Jessica Ramsay MD   DULoxetine (CYMBALTA) 30 MG capsule Take 90 mg by mouth every morning     Reported, Patient   insulin glargine (LANTUS PEN) 100 UNIT/ML pen Inject 20 Units Subcutaneous every morning (before breakfast) 1/8/23   Anjelica Sharif MD   insulin lispro (HUMALOG KWIKPEN) 100 UNIT/ML (1 unit dial) KWIKPEN Inject Subcutaneous 3 times daily (before meals) 140-189=1 unit  190-239= 2 units  240-289= 3 units  290-339= 4 units  340-399= 5 units  400-999= 6 units    Unknown, Entered By History   ketoconazole (NIZORAL) 2 % shampoo To entire wet scalp and ears and then wash off  after 5 minutes three times a week.  Patient taking differently: To entire wet scalp and ears and then wash off after 5 minutes Tuesday and Friday 2/20/18   Rex Rubio MD   latanoprost (XALATAN) 0.005 % ophthalmic solution Place 1 drop into both eyes At Bedtime 6/28/22   Jessica Ramsay MD   liraglutide (VICTOZA PEN) 18 MG/3ML solution Inject subcu 1.2mg daily. 9/28/22   Renetta Washington PA-C   loperamide (IMODIUM A-D) 2 MG tablet Take 2 mg by mouth 4 times daily as needed for diarrhea    Reported, Patient   loratadine (CLARITIN) 10 MG tablet Take 10 mg by mouth as needed.    Reported, Patient   melatonin 3 MG tablet Take 3 mg by mouth At Bedtime    Unknown, Entered By History   metFORMIN (GLUCOPHAGE XR) 500 MG 24 hr tablet Take 1 tablet (500 mg) by mouth daily (with dinner) 2/3/23   Vu Islas MD   metoprolol tartrate (LOPRESSOR) 100 MG tablet Take 100 mg by mouth 2 times daily    Reported, Patient   nitroGLYCERIN (NITROSTAT) 0.4 MG SL tablet Place 0.4 mg under the tongue every 5 minutes as needed.    Reported, Patient   olopatadine (PATADAY) 0.2 % ophthalmic solution Place 1 drop into both eyes daily For itchy eyes 6/28/22   Jessica Ramsay MD   omeprazole (PRILOSEC) 20 MG DR capsule Take 2 capsules (40 mg) by mouth daily 2/3/23   Vu Islas MD   polyvinyl alcohol (LIQUIFILM TEARS) 1.4 % ophthalmic solution Place 1 drop into both eyes 3 times daily    Reported, Patient   senna-docusate (SENOKOT-S/PERICOLACE) 8.6-50 MG tablet Take 2 tablets by mouth 2 times daily as needed for constipation 8/6/19   Lisa Stevens APRN CNP   simethicone (MYLICON) 125 MG chewable tablet Take 125 mg by mouth 4 times daily as needed After meals and HS prn    Reported, Patient   traZODone (DESYREL) 50 MG tablet Take 1 tablet (50 mg) by mouth At Bedtime 2/3/23   Vu Islas MD   White Petrolatum-Mineral Oil (REFRESH P.M.) OINT Apply to eye nightly as needed    Reported, Patient         ALLERGIES:  Allergies   Allergen Reactions     Dust Mites Other (See Comments)     Sneezing runny eyes and nose.     Food Allergy Formula Hives     Mountain Dew and Walnuts     Pollen Extract Other (See Comments)     Sneezing runny eyes and nose.       FAMILY HISTORY:  Family History   Problem Relation Age of Onset     Hypertension Mother      Cerebrovascular Disease Mother      Glaucoma Father      Cancer Father      Diabetes Sister      Glaucoma Sister      Cancer - colorectal Other      Cerebrovascular Disease Other      Skin Cancer No family hx of      Melanoma No family hx of      Anesthesia Reaction No family hx of      Deep Vein Thrombosis (DVT) No family hx of      Arthritis Brother      Diabetes Sister      Glaucoma Sister        SOCIAL HISTORY:   Social History     Socioeconomic History     Marital status: Single     Number of children: 5   Tobacco Use     Smoking status: Former     Packs/day: 1.50     Years: 45.00     Pack years: 67.50     Types: Cigarettes     Start date: 1968     Quit date: 3/6/2013     Years since quittin.9     Smokeless tobacco: Never   Vaping Use     Vaping Use: Never used   Substance and Sexual Activity     Alcohol use: Not Currently     Drug use: Never     Sexual activity: Not Currently   Social History Narrative    Pt has three daughters and two sons, single.  Her daughter Court see's her often at the Nursing Home ([facility]).  Moved into Nursing Home in 2011 after a hospitalization for ALY.  Moved from [place] in  to [facility]. Has 5 grandchildren. Has living will, desires DNR/DNI.  (last updated 2023)        VITALS:  Patient Vitals for the past 24 hrs:   BP Temp Temp src Pulse Resp SpO2   23 1230 (!) 146/67 -- -- 100 -- 100 %   23 1130 134/60 -- -- 96 -- 100 %   23 1100 (!) 143/65 -- -- 96 -- 100 %   23 1016 133/62 -- -- 93 -- 100 %   23 1012 -- 97.9  F (36.6  C) Oral -- -- --   23 1010 136/62 -- -- 93  18 98 %        PHYSICAL EXAM    Constitutional:  Awake, in no apparent distress  HENT:  Normocephalic, Atraumatic. Bilateral external ears normal. Oropharynx moist. Nose normal. Neck- Normal range of motion with no guarding, No midline cervical tenderness, Supple, No stridor.   Eyes:  PERRL, EOMI with no signs of entrapment, Conjunctiva normal, No discharge.   Respiratory:  Normal breath sounds, No respiratory distress, No wheezing.    Cardiovascular:  Normal heart rate, Normal rhythm, No appreciable rubs or gallops.   GI:  Soft, No tenderness, No distension, No palpable masses  Musculoskeletal:  Intact distal pulses, No edema. Good range of motion in all major joints. No tenderness to palpation or major deformities noted.  Integument:  Warm, Dry, large pressure ulcer noted to the left lower back into the right hip, no surrounding erythema or discharge, no active bleeding, no subcutaneous emphysema  Neurologic:  Alert & orientated to self and place but not time, Normal motor function, Normal sensory function, No focal deficits noted.   Psychiatric:  Flat affect    LAB:  All pertinent labs reviewed and interpreted.  Results for orders placed or performed during the hospital encounter of 02/16/23   Glucose by meter   Result Value Ref Range    GLUCOSE BY METER POCT 358 (H) 70 - 99 mg/dL       RADIOLOGY:  No orders to display          EKG:      I have independently reviewed and interpreted the EKG(s) documented above.    PROCEDURES:        [facility] Documentation:   CMS Diagnoses:        I, Adry Calzada, am serving as a scribe to document services personally performed by Marquise Coley MD, based on my observation and the provider's statements to me. I, Marquise Coley MD attest that Adry Calzada is acting in a scribe capacity, has observed my performance of the services and has documented them in accordance with my direction.    Marquise Coley M.D.  Emergency Medicine  [facility]  [facility] EMERGENCY DEPARTMENT  [address] [place] [id number]  [phone]  Dept: [phone]     Marquise Coley MD  02/16/23 [id number]

## 2023-02-16 NOTE — ED NOTES
Bed: JNED-20  Expected date:   Expected time:   Means of arrival: Ambulance  Comments:  MPLW: Needs wound debridement

## 2023-02-16 NOTE — ED TRIAGE NOTES
Pt arrives via EMS from [facility]. Staff reports patient was seen by a provider at their facility for her wounds on right hip and left buttocks. Their provider wanted patient seen for wound debridement.

## 2023-02-16 NOTE — PHARMACY-ADMISSION MEDICATION HISTORY
Pharmacy Note - Admission Medication History   ______________________________________________________________________    Prior To Admission (PTA) med list completed and updated in EMR.       PTA Med List   Medication Sig Note Last Dose     acetaminophen (TYLENOL) 325 MG tablet Take 2 tablets (650 mg) by mouth every 6 hours as needed for mild pain or other (and adjunct with moderate or severe pain or per patient request)  2/15/2023 at 2355     ASPIRIN LOW DOSE 81 MG chewable tablet Take 81 mg by mouth daily  2/15/2023     atorvastatin (LIPITOR) 80 MG tablet Take 1 tablet by mouth daily. Pt needs to have labs done prior to further refills.  2/15/2023     bisacodyl (DULCOLAX) 10 MG suppository Place 10 mg rectally daily as needed for constipation  Unknown     diclofenac (VOLTAREN) 1 % topical gel Apply 2 g topically 4 times daily  2/16/2023 at x1     dorzolamide-timolol (COSOPT) 2-0.5 % ophthalmic solution Place 1 drop into both eyes 2 times daily  2/15/2023     DULoxetine (CYMBALTA) 30 MG capsule Take 90 mg by mouth every morning   2/15/2023     insulin glargine (LANTUS PEN) 100 UNIT/ML pen Inject 20 Units Subcutaneous every morning (before breakfast)  2/16/2023     insulin lispro (HUMALOG KWIKPEN) 100 UNIT/ML (1 unit dial) KWIKPEN Inject Subcutaneous 3 times daily (before meals) 140-189=1 unit  190-239= 2 units  240-289= 3 units  290-339= 4 units  340-399= 5 units  400-999= 6 units  2/16/2023 at x1     ketoconazole (NIZORAL) 2 % shampoo To entire wet scalp and ears and then wash off after 5 minutes three times a week. 2/16/2023: Tuesday, Friday, Sunday Past Week     latanoprost (XALATAN) 0.005 % ophthalmic solution Place 1 drop into both eyes At Bedtime  2/15/2023     liraglutide (VICTOZA PEN) 18 MG/3ML solution Inject subcu 1.2mg daily.  2/16/2023     loperamide (IMODIUM A-D) 2 MG tablet Take 2 mg by mouth 4 times daily as needed for diarrhea  Unknown     loratadine (CLARITIN) 10 MG tablet Take 10 mg by mouth daily  as needed for allergies  Unknown     melatonin 3 MG tablet Take 3 mg by mouth At Bedtime  2/15/2023 at 1900     metFORMIN (GLUCOPHAGE XR) 500 MG 24 hr tablet Take 1 tablet (500 mg) by mouth daily (with dinner)  2/15/2023     metoprolol tartrate (LOPRESSOR) 100 MG tablet Take 100 mg by mouth 2 times daily  2/15/2023     nitroGLYCERIN (NITROSTAT) 0.4 MG SL tablet Place 0.4 mg under the tongue every 5 minutes as needed.  Unknown     olopatadine (PATADAY) 0.2 % ophthalmic solution Place 1 drop into both eyes daily For itchy eyes  2/16/2023     omeprazole (PRILOSEC) 20 MG DR capsule Take 2 capsules (40 mg) by mouth daily  2/15/2023     polyvinyl alcohol (LIQUIFILM TEARS) 1.4 % ophthalmic solution Place 1 drop into both eyes 3 times daily  2/16/2023 at x1     senna-docusate (SENOKOT-S/PERICOLACE) 8.6-50 MG tablet Take 2 tablets by mouth 2 times daily as needed for constipation  Unknown     simethicone (MYLICON) 125 MG chewable tablet Take 125 mg by mouth 4 times daily as needed After meals and HS prn  Unknown     traMADol (ULTRAM) 50 MG tablet Take 25 mg by mouth 2 times daily  2/15/2023     traMADol (ULTRAM) 50 MG tablet Take 25 mg by mouth 2 times daily as needed for severe pain (7-10)  2/16/2023 at 0430     traZODone (DESYREL) 50 MG tablet Take 1 tablet (50 mg) by mouth At Bedtime  2/15/2023 at 1900     White Petrolatum-Mineral Oil (REFRESH P.M.) OINT Place into both eyes nightly as needed  Unknown       Information source(s): Facility (U/NH/) medication list/MAR  Method of interview communication: N/A    Summary of Changes to PTA Med List  Changed: tramadol    Patient did not bring any medications to the hospital and can't retrieve from home. No multi-dose medications are available for use during hospital stay.     The information provided in this note is only as accurate as the sources available at the time of the update(s).    Thank you for the opportunity to participate in the care of this patient.    Marisa  RICHIE Sinha [facility]  2/16/2023 5:57 PM

## 2023-02-17 PROBLEM — E83.42 HYPOMAGNESEMIA: Status: ACTIVE | Noted: 2019-08-23

## 2023-02-17 PROBLEM — E83.52 HYPERCALCEMIA: Status: ACTIVE | Noted: 2023-01-01

## 2023-02-17 PROBLEM — E87.6 HYPOKALEMIA: Status: ACTIVE | Noted: 2023-01-01

## 2023-02-17 NOTE — CONSULTS
General Surgery Consultation  Cristal Preciado MRN# [id number]   Age/Sex: 72 year old female YOB: 1950     Reason for consult: 1. Sacral wound, initial encounter    2. Wound infection    3. Severe sepsis (H)            Requesting physician: Dr Calvert                   Assessment and Plan:   Assessment:  Sacral decub wound with necrotic soft tissue less likely necrotizing fasciitis    Plan:  -Recommend official debridement in the OR.  Currently son in the room and daughter is on her way both are patient's decision makers.Alexandra Preciado [phone] and son- Camron Preciado [phone].    -Thank you for consulting us involving us in her care.          Chief Complaint   Patient presents with     Wound Infection        History is obtained from the electronic health record    HPI:   Cristal Preciado is a 72 year old female who is nonambulatory, with dementia, recent hospital stay 2/1 discharged on 2/3 for failure to thrive and right trochanter ulcer and sacral decub ulcer, type 2 diabetes, diabetic neuropathy, CAD, CVA, hypertension, CKD, stroke  not on any anticoagulants other than baby aspirin daily who presents to the emergency room yesterday from [facility] center with concerns of worsening appearance of her wounds.  They continue to be painful.  She had no fever, chills but reported vomiting.  Upon evaluation she presented with a lactic acid of 6.4 which has gone down with supportive cares, white count of 14.1 and CT imaging with sacral decub ulcer with air concerning for necrotizing fasciitis.  Patient admitted and started on IV antibiotics.  Currently patient is stable and resting in bed on her right side.          Past Medical History:     Past Medical History:   Diagnosis Date     AION (anterior ischaemic optic neuropathy), left eye     NAION LE     CAD (coronary artery disease) 3/2009    [facility]; Angio 2013 [facility]- normal coronary arteries     Cataract      CVA (cerebral vascular accident) (H)      admitted at [facility]     on Cymbalta     Diabetes mellitus, type 2 (H)      Diabetic nephropathy (H)      Diabetic neuropathy (H)     severe     Diabetic retinopathy (H)      GERD (gastroesophageal reflux disease)      Hyperlipidemia      Hypertension     ECHO 2013, TDS, NL EF     Infection due to  novel coronavirus 2023     POAG (primary open-angle glaucoma)     adv BE     Seasonal allergies      Tubular adenoma of colon     repeat colonoscopy in               Past Surgical History:     Past Surgical History:   Procedure Laterality Date     CARDIAC CATHETERIZATION       CATARACT EXTRACTION W/ INTRAOCULAR LENS IMPLANT Bilateral       SECTION        SECTION       COLONOSCOPY  7/15/2013    Tubular adenoma; repeat in 2018;Procedure: COMBINED COLONOSCOPY, SINGLE BIOPSY/POLYPECTOMY BY BIOPSY;;  Surgeon: Don King MD;  Tubular adenoma     COLONOSCOPY N/A 2020    Procedure: COLONOSCOPY;  Surgeon: Sid Amanda MD;  Location: [facility] GI     CORONARY STENT PLACEMENT  2004    RCA     DAVINCI HYSTERECTOMY TOTAL, BILATERAL SALPINGO-OOPHORECTOMY, COMBINED N/A 2019    Procedure: DaVinci Assisted Total Laparoscopic Hysterectomy, Removal Of Both Tubes And Ovaries;  Surgeon: Linh Cardoso MD;  Location: [facility] OR     EXTRACAPSULAR CATARACT EXTRATION WITH INTRAOCULAR LENS IMPLANT  11-10-09, 2-9-10    11-10-09 Lt, 2-9-10 Rt; Left eye 2012     HYSTERECTOMY TOTAL ABDOMINAL, BILATERAL SALPINGO-OOPHORECTOMY, COMBINED  2019    Procedure: DaVinci Assisted Total Laparoscopic Hysterectomy, Removal Of Both Tubes And Ovaries; Surgeon: Linh Cardoso MD; Location: [facility] OR       STENT, CORONARY, DELORES      RCA             Social History:    reports that she quit smoking about 9 years ago. Her smoking use included cigarettes. She started smoking about 55 years ago. She has a 67.50 pack-year smoking history. She has never used smokeless tobacco. She reports  that she does not currently use alcohol. She reports that she does not use drugs.           Family History:     Family History   Problem Relation Age of Onset     Hypertension Mother      Cerebrovascular Disease Mother      Glaucoma Father      Cancer Father      Diabetes Sister      Glaucoma Sister      Cancer - colorectal Other      Cerebrovascular Disease Other      Skin Cancer No family hx of      Melanoma No family hx of      Anesthesia Reaction No family hx of      Deep Vein Thrombosis (DVT) No family hx of      Arthritis Brother      Diabetes Sister      Glaucoma Sister               Allergies:     Allergies   Allergen Reactions     Dust Mites Other (See Comments)     Sneezing runny eyes and nose.     Food Allergy Formula Hives     Mountain Dew and Walnuts     Pollen Extract Other (See Comments)     Sneezing runny eyes and nose.              Medications:     Prior to Admission medications    Medication Sig Start Date End Date Taking? Authorizing Provider   acetaminophen (TYLENOL) 325 MG tablet Take 2 tablets (650 mg) by mouth every 6 hours as needed for mild pain or other (and adjunct with moderate or severe pain or per patient request) 2/3/23  Yes Vu Islas MD   ASPIRIN LOW DOSE 81 MG chewable tablet Take 81 mg by mouth daily 6/12/19  Yes Reported, Patient   atorvastatin (LIPITOR) 80 MG tablet Take 1 tablet by mouth daily. Pt needs to have labs done prior to further refills. 10/13/11  Yes Edward Rose MD   bisacodyl (DULCOLAX) 10 MG suppository Place 10 mg rectally daily as needed for constipation   Yes Reported, Patient   diclofenac (VOLTAREN) 1 % topical gel Apply 2 g topically 4 times daily   Yes Reported, Patient   dorzolamide-timolol (COSOPT) 2-0.5 % ophthalmic solution Place 1 drop into both eyes 2 times daily 6/28/22  Yes Jessica Ramsay MD   DULoxetine (CYMBALTA) 30 MG capsule Take 90 mg by mouth every morning    Yes Reported, Patient   insulin glargine (LANTUS PEN) 100 UNIT/ML pen  Inject 20 Units Subcutaneous every morning (before breakfast) 1/8/23  Yes Anjelica Sharif MD   insulin lispro (HUMALOG KWIKPEN) 100 UNIT/ML (1 unit dial) KWIKPEN Inject Subcutaneous 3 times daily (before meals) 140-189=1 unit  190-239= 2 units  240-289= 3 units  290-339= 4 units  340-399= 5 units  400-999= 6 units   Yes Unknown, Entered By History   ketoconazole (NIZORAL) 2 % shampoo To entire wet scalp and ears and then wash off after 5 minutes three times a week. 2/20/18  Yes Rex Rubio MD   latanoprost (XALATAN) 0.005 % ophthalmic solution Place 1 drop into both eyes At Bedtime 6/28/22  Yes Jessica Ramsay MD   liraglutide (VICTOZA PEN) 18 MG/3ML solution Inject subcu 1.2mg daily. 9/28/22  Yes Renetta Washington PA-C   loperamide (IMODIUM A-D) 2 MG tablet Take 2 mg by mouth 4 times daily as needed for diarrhea   Yes Reported, Patient   loratadine (CLARITIN) 10 MG tablet Take 10 mg by mouth daily as needed for allergies   Yes Reported, Patient   melatonin 3 MG tablet Take 3 mg by mouth At Bedtime   Yes Unknown, Entered By History   metFORMIN (GLUCOPHAGE XR) 500 MG 24 hr tablet Take 1 tablet (500 mg) by mouth daily (with dinner) 2/3/23  Yes Vu Islas MD   metoprolol tartrate (LOPRESSOR) 100 MG tablet Take 100 mg by mouth 2 times daily   Yes Reported, Patient   nitroGLYCERIN (NITROSTAT) 0.4 MG SL tablet Place 0.4 mg under the tongue every 5 minutes as needed.   Yes Reported, Patient   olopatadine (PATADAY) 0.2 % ophthalmic solution Place 1 drop into both eyes daily For itchy eyes 6/28/22  Yes Jessica Ramsay MD   omeprazole (PRILOSEC) 20 MG DR capsule Take 2 capsules (40 mg) by mouth daily 2/3/23  Yes Vu Islas MD   polyvinyl alcohol (LIQUIFILM TEARS) 1.4 % ophthalmic solution Place 1 drop into both eyes 3 times daily   Yes Reported, Patient   senna-docusate (SENOKOT-S/PERICOLACE) 8.6-50 MG tablet Take 2 tablets by mouth 2 times daily as needed for constipation 8/6/19  Yes Rodney,  LAMONT Darby CNP   simethicone (MYLICON) 125 MG chewable tablet Take 125 mg by mouth 4 times daily as needed After meals and HS prn   Yes Reported, Patient   traMADol (ULTRAM) 50 MG tablet Take 25 mg by mouth 2 times daily   Yes Reported, Patient   traMADol (ULTRAM) 50 MG tablet Take 25 mg by mouth 2 times daily as needed for severe pain (7-10)   Yes Reported, Patient   traZODone (DESYREL) 50 MG tablet Take 1 tablet (50 mg) by mouth At Bedtime 2/3/23  Yes Vu Islas MD   White Petrolatum-Mineral Oil (REFRESH P.M.) OINT Place into both eyes nightly as needed   Yes Reported, Patient              Review of Systems:   The Review of Systems is negative other than noted in the HPI            Physical Exam:     Patient Vitals for the past 24 hrs:   BP Temp Temp src Pulse Resp SpO2   02/17/23 0747 107/49 98.5  F (36.9  C) Oral 100 22 100 %   02/17/23 0324 (!) 145/65 98.1  F (36.7  C) Oral 89 20 99 %   02/17/23 0207 110/56 97.9  F (36.6  C) Oral 96 18 98 %   02/16/23 2248 (!) 173/72 98.4  F (36.9  C) Oral 102 16 97 %   02/16/23 1800 (!) 185/69 -- -- 105 -- 99 %   02/16/23 1700 (!) 168/72 -- -- 106 -- 96 %   02/16/23 1600 (!) 155/71 -- -- 110 -- 100 %   02/16/23 1230 (!) 146/67 -- -- 100 -- 100 %   02/16/23 1130 134/60 -- -- 96 -- 100 %   02/16/23 1100 (!) 143/65 -- -- 96 -- 100 %   02/16/23 1016 133/62 -- -- 93 -- 100 %   02/16/23 1012 -- 97.9  F (36.6  C) Oral -- -- --   02/16/23 1010 136/62 -- -- 93 18 98 %          Intake/Output Summary (Last 24 hours) at 2/17/2023 0935  Last data filed at 2/17/2023 0646  Gross per 24 hour   Intake 3450 ml   Output 730 ml   Net 2720 ml      Constitutional:   awake, alert, cooperative, no apparent distress, and appears stated age             Hematologic / Lymphatic:          Lungs:   Normal respiratory effort, no accessory muscle use       Cardiovascular:   Regular rate and rhythm       Abdomen:   Soft and nontender       Musculoskeletal:   No obvious swelling, bruising or  deformity-not able to evaluate her right trochanter ulcer as she is laying on her right side difficult to move.        Skin:   Acral ulcer 3 x 3 cm opening but significant undermining which she had in the past but this is worse from the last time I saw it at her last hospital stay with foul-smelling soft necrotic soft tissue present difficult to see the extent             Data:         All imaging studies reviewed by me.  Reviewed images as well as radiology reports.    Results for orders placed or performed during the hospital encounter of 02/16/23 (from the past 24 hour(s))   Glucose by meter   Result Value Ref Range    GLUCOSE BY METER POCT 358 (H) 70 - 99 mg/dL   CBC with platelets differential    Narrative    The following orders were created for panel order CBC with platelets differential.  Procedure                               Abnormality         Status                     ---------                               -----------         ------                     CBC with platelets and d...[[id number]]  Abnormal            Final result                 Please view results for these tests on the individual orders.   Comprehensive metabolic panel   Result Value Ref Range    Sodium 132 (L) 136 - 145 mmol/L    Potassium 4.2 3.4 - 5.3 mmol/L    Chloride 93 (L) 98 - 107 mmol/L    Carbon Dioxide (CO2) 20 (L) 22 - 29 mmol/L    Anion Gap 19 (H) 7 - 15 mmol/L    Urea Nitrogen 34.6 (H) 8.0 - 23.0 mg/dL    Creatinine 0.93 0.51 - 0.95 mg/dL    Calcium 10.3 (H) 8.8 - 10.2 mg/dL    Glucose 440 (H) 70 - 99 mg/dL    Alkaline Phosphatase 318 (H) 35 - 104 U/L    AST 54 (H) 10 - 35 U/L    ALT 36 (H) 10 - 35 U/L    Protein Total 8.0 6.4 - 8.3 g/dL    Albumin 2.9 (L) 3.5 - 5.2 g/dL    Bilirubin Total 0.8 <=1.2 mg/dL    GFR Estimate 66 >60 mL/min/1.73m2   Lactic acid whole blood   Result Value Ref Range    Lactic Acid 6.4 (HH) 0.7 - 2.0 mmol/L   CRP inflammation   Result Value Ref Range    CRP Inflammation 337.70 (H) <5.00 mg/L    Erythrocyte sedimentation rate auto   Result Value Ref Range    Erythrocyte Sedimentation Rate 118 (H) 0 - 20 mm/hr   CBC with platelets and differential   Result Value Ref Range    WBC Count 14.1 (H) 4.0 - 11.0 10e3/uL    RBC Count 3.50 (L) 3.80 - 5.20 10e6/uL    Hemoglobin 10.5 (L) 11.7 - 15.7 g/dL    Hematocrit 32.9 (L) 35.0 - 47.0 %    MCV 94 78 - 100 fL    MCH 30.0 26.5 - 33.0 pg    MCHC 31.9 31.5 - 36.5 g/dL    RDW 13.5 10.0 - 15.0 %    Platelet Count 369 150 - 450 10e3/uL    % Neutrophils 81 %    % Lymphocytes 8 %    % Monocytes 10 %    % Eosinophils 0 %    % Basophils 0 %    % Immature Granulocytes 1 %    NRBCs per 100 WBC 0 <1 /100    Absolute Neutrophils 11.6 (H) 1.6 - 8.3 10e3/uL    Absolute Lymphocytes 1.1 0.8 - 5.3 10e3/uL    Absolute Monocytes 1.3 0.0 - 1.3 10e3/uL    Absolute Eosinophils 0.0 0.0 - 0.7 10e3/uL    Absolute Basophils 0.0 0.0 - 0.2 10e3/uL    Absolute Immature Granulocytes 0.1 <=0.4 10e3/uL    Absolute NRBCs 0.0 10e3/uL   CT Pelvis Soft Tissue w Contrast   Result Value Ref Range    Radiologist flags Necrotizing fasciitis (AA)     Narrative    EXAM: CT PELVIS SOFT TISSUE W CONTRAST  LOCATION: [facility]  DATE/TIME: 2/16/2023 6:31 PM    INDICATION: Malodorous wounds to left buttock and right hip, severe sepsis on lab testing today  COMPARISON: 1/3/2023  TECHNIQUE: CT scan of the pelvis was performed with IV contrast. Multiplanar reformats were obtained. Dose reduction techniques were used.  CONTRAST: 100ml isovue 370    FINDINGS: Mild motion artifact.    PELVIC ORGANS: Increased circumferential bladder wall thickening measuring up to 0.8 cm is consistent with cystitis. Hysterectomy. Atherosclerotic vascular disease.    MUSCULOSKELETAL: A new sacral decubitus ulcer with subcutaneous emphysema extending into the surrounding soft tissues with associated inflammatory changes. A new decubitus ulcer at the right greater trochanter with surrounding inflammatory changes.  No   loculated drainable fluid collection. No aggressive osseous erosion. Degenerative changes of the spine and hips.      Impression    IMPRESSION:  1.  New sacral decubitus ulcer with subcutaneous emphysema extending into the surrounding soft tissues. This could represent necrotizing fasciitis.  2.  A new right greater trochanter decubitus ulcer.  3.  Cystitis.      [Critical Result: Necrotizing fasciitis]    Finding was identified on 2/16/2023 6:35 PM.     Dr. Singer was contacted by me on 2/16/2023 6:42 PM and verbalized understanding of the critical result.      Lactic acid whole blood   Result Value Ref Range    Lactic Acid 3.6 (H) 0.7 - 2.0 mmol/L   UA with Microscopic reflex to Culture    Specimen: Urine, Midstream   Result Value Ref Range    Color Urine Yellow Colorless, Straw, Light Yellow, Yellow    Appearance Urine Cloudy (A) Clear    Glucose Urine 500 (A) Negative mg/dL    Bilirubin Urine Negative Negative    Ketones Urine Negative Negative mg/dL    Specific Gravity Urine 1.029 1.001 - 1.030    Blood Urine 0.1 mg/dL (A) Negative    pH Urine 5.0 5.0 - 7.0    Protein Albumin Urine 20 (A) Negative mg/dL    Urobilinogen Urine <2.0 <2.0 mg/dL    Nitrite Urine Negative Negative    Leukocyte Esterase Urine 500 Summer/uL (A) Negative    Bacteria Urine Many (A) None Seen /HPF    WBC Clumps Urine Present (A) None Seen /HPF    RBC Urine 29 (H) <=2 /HPF    WBC Urine 31 (H) <=5 /HPF    Narrative    Urine Culture ordered based on laboratory criteria   Glucose by meter   Result Value Ref Range    GLUCOSE BY METER POCT 343 (H) 70 - 99 mg/dL   Glucose by meter   Result Value Ref Range    GLUCOSE BY METER POCT 324 (H) 70 - 99 mg/dL   Glucose by meter   Result Value Ref Range    GLUCOSE BY METER POCT 279 (H) 70 - 99 mg/dL   Glucose by meter   Result Value Ref Range    GLUCOSE BY METER POCT 166 (H) 70 - 99 mg/dL   Ionized Calcium   Result Value Ref Range    Calcium, Ionized pH 7.4 1.20 1.11 - 1.30 mmol/L    pH 7.42 7.35 -  7.45    Calcium, Ionized Measured 1.19 1.11 - 1.30 mmol/L   Extra Tube    Narrative    The following orders were created for panel order Extra Tube.  Procedure                               Abnormality         Status                     ---------                               -----------         ------                     Extra Purple Top Tube[[id number]]                            Final result                 Please view results for these tests on the individual orders.   Extra Purple Top Tube   Result Value Ref Range    Hold Specimen JIC    Glucose by meter   Result Value Ref Range    GLUCOSE BY METER POCT 117 (H) 70 - 99 mg/dL   CBC with platelets differential    Narrative    The following orders were created for panel order CBC with platelets differential.  Procedure                               Abnormality         Status                     ---------                               -----------         ------                     CBC with platelets and d...[[id number]]  Abnormal            Final result                 Please view results for these tests on the individual orders.   CBC with platelets and differential   Result Value Ref Range    WBC Count 9.4 4.0 - 11.0 10e3/uL    RBC Count 2.65 (L) 3.80 - 5.20 10e6/uL    Hemoglobin 8.0 (L) 11.7 - 15.7 g/dL    Hematocrit 24.5 (L) 35.0 - 47.0 %    MCV 93 78 - 100 fL    MCH 30.2 26.5 - 33.0 pg    MCHC 32.7 31.5 - 36.5 g/dL    RDW 13.3 10.0 - 15.0 %    Platelet Count 274 150 - 450 10e3/uL    % Neutrophils 77 %    % Lymphocytes 9 %    % Monocytes 12 %    % Eosinophils 1 %    % Basophils 0 %    % Immature Granulocytes 1 %    NRBCs per 100 WBC 0 <1 /100    Absolute Neutrophils 7.3 1.6 - 8.3 10e3/uL    Absolute Lymphocytes 0.8 0.8 - 5.3 10e3/uL    Absolute Monocytes 1.2 0.0 - 1.3 10e3/uL    Absolute Eosinophils 0.1 0.0 - 0.7 10e3/uL    Absolute Basophils 0.0 0.0 - 0.2 10e3/uL    Absolute Immature Granulocytes 0.1 <=0.4 10e3/uL    Absolute NRBCs 0.0 10e3/uL   Glucose by meter    Result Value Ref Range    GLUCOSE BY METER POCT 133 (H) 70 - 99 mg/dL     *Note: Due to a large number of results and/or encounters for the requested time period, some results have not been displayed. A complete set of results can be found in Results Review.        JODY Zavala  [facility]  [address] [place] [address]  Phone- [phone]  Fax- [phone]

## 2023-02-17 NOTE — H&P
[facility]    History and Physical - Hospitalist Service       Date of Admission:  2/16/2023    Assessment & Plan      Cristal Preciado is a 72 year old female with PMH significant for known sacral decubitus ulcer (present on admission), CVA, dementia, neuropathy, DM2, CKD, CAD and FTT admitted on 2/16/2023 with infected new sacral decubitus ulcer with possible necrotizing fasciitis.     1. Sepsis- Admit patient. Infected Sacral Decubitus ulcer/Possible Necrotizing fasciits,UTI,Lactic acid= 6.4, WBC= 14.1. CT pelvis report reviewed. ED physician has already been in contact with General Surgeon on call and patient will be seen in consultation. Continue Vancomycin, Zosyn and Clindamycin. Continue IV fluids. Currently NPO. Repeat labs in a.m. Monitor vitals closely. Will make further recommendations based on clinical course.    2. Possible Necrotizing Fasciitis- ED has contacted General Surgeon on call and patient will be seen in consultation. Continue Vancomycin, Zosyn and Clindamycin. Plan per surgeon.    3. New infected Sacral decubitus ulcer- Continue IV antibiotics. Follow up General Surgeon recommendations.    4. UTI- Urinalysis reviewed and consistent with infection. Continue Zosyn. Follow up urine culture.    5. Leukocytosis- 2/2 infected sacral decubitus ulcer and UTI. Continue Vancomycin, Zosyn and Clindamycin. Repeat CBC in a.m.    6. Hyponatremia- Mild. On IV fluids while NPO. Repeat electrolytes in a.m.    7. Hypercalcemia- Check Ionized calcium    8. Dementia- Son is present at bedside and tells me that Surrogate decision makers are daughter- Alexandra Preciado [phone] and son- Camron Preciado [phone].       Diet:   NPO  DVT Prophylaxis: Pneumatic Compression Devices  Cornejo Catheter: Not present  Lines: None     Cardiac Monitoring: None  Code Status:   Full    Clinically Significant Risk Factors Present on Admission           # Hypercalcemia: Highest Ca = 10.3 mg/dL in last 2  days, will monitor as appropriate   # Anion Gap Metabolic Acidosis: Highest Anion Gap = 19 mmol/L in last 2 days, will monitor and treat as appropriate  # Hypoalbuminemia: Lowest albumin = 2.9 g/dL at 2/16/2023  2:31 PM, will monitor as appropriate         # DMII: A1C = 7.6 % (Ref range: <5.7 %) within past 3 months            Disposition Plan  Anticipate discharge to TCU when medically stable.    Expected Discharge Date: 2/20/23          Hazel Martini MD  Hospitalist Service  [facility]  Securely message with [employer] (more info)  Text page via [facility] Paging/Directory     ______________________________________________________________________    Chief Complaint   Foul smell from Sacral decubitus ulcer    History is obtained from the patient, patient's son and ED physician.    History of Present Illness   Cristal Preciado is a 72 year old female with PMH significant for known sacral decubitus ulcer (present on admission), CVA, dementia, neuropathy, DM2, CKD, CAD and FTT admitted on 2/16/2023 with infected new sacral decubitus ulcer with possible necrotizing fasciitis. Patient has limited verbal communication. Son was present at bedside and assisted in gathering information for HPI. Patient was recently discharged from Hospital to TCU on 2/03/23 with known sacral decubitus ulcer. Son says that patient was doing okay until a few days ago. He is unable to further clarify her symptoms. Per ED physician, staff noticed foul-smelling discharge from sacral wound and sent patient to ED for further evaluation. Patient denies any current pain, but is unable to answer any further review of systems. She currently appears in no acute distress as she lays on her right side.      Past Medical History    Past Medical History:   Diagnosis Date     AION (anterior ischaemic optic neuropathy), left eye     NAION LE     CAD (coronary artery disease) 3/2009    [facility]; Angio 2013 [facility]- normal coronary arteries      Cataract      CVA (cerebral vascular accident) (H)     admitted at [facility]     on Cymbalta     Diabetes mellitus, type 2 (H)      Diabetic nephropathy (H)      Diabetic neuropathy (H)     severe     Diabetic retinopathy (H)      GERD (gastroesophageal reflux disease)      Hyperlipidemia      Hypertension     ECHO 2013, TDS, NL EF     Infection due to 2019 novel coronavirus 2023     POAG (primary open-angle glaucoma)     adv BE     Seasonal allergies      Tubular adenoma of colon     repeat colonoscopy in        Past Surgical History   Past Surgical History:   Procedure Laterality Date     CARDIAC CATHETERIZATION       CATARACT EXTRACTION W/ INTRAOCULAR LENS IMPLANT Bilateral       SECTION        SECTION       COLONOSCOPY  7/15/2013    Tubular adenoma; repeat in 2018;Procedure: COMBINED COLONOSCOPY, SINGLE BIOPSY/POLYPECTOMY BY BIOPSY;;  Surgeon: Don King MD;  Tubular adenoma     COLONOSCOPY N/A 2020    Procedure: COLONOSCOPY;  Surgeon: Sid Amanda MD;  Location: [facility] GI     CORONARY STENT PLACEMENT  2004    RCA     DAVINCI HYSTERECTOMY TOTAL, BILATERAL SALPINGO-OOPHORECTOMY, COMBINED N/A 2019    Procedure: DaVinci Assisted Total Laparoscopic Hysterectomy, Removal Of Both Tubes And Ovaries;  Surgeon: Linh Cardoso MD;  Location: [facility] OR     EXTRACAPSULAR CATARACT EXTRATION WITH INTRAOCULAR LENS IMPLANT  11-10-09, 2-9-10    11-10-09 Lt, 2-9-10 Rt; Left eye 2012     HYSTERECTOMY TOTAL ABDOMINAL, BILATERAL SALPINGO-OOPHORECTOMY, COMBINED  2019    Procedure: DaVinci Assisted Total Laparoscopic Hysterectomy, Removal Of Both Tubes And Ovaries; Surgeon: Linh Cardoso MD; Location: [facility] OR       STENT, CORONARY, DELORES      RCA       Prior to Admission Medications   Prior to Admission Medications   Prescriptions Last Dose Informant Patient Reported? Taking?   ASPIRIN LOW DOSE 81 MG chewable tablet 2/15/2023  Yes Yes   Sig:  Take 81 mg by mouth daily   DULoxetine (CYMBALTA) 30 MG capsule 2/15/2023  Yes Yes   Sig: Take 90 mg by mouth every morning    White Petrolatum-Mineral Oil (REFRESH P.M.) OINT Unknown  Yes Yes   Sig: Place into both eyes nightly as needed   acetaminophen (TYLENOL) 325 MG tablet 2/15/2023 at 2355  No Yes   Sig: Take 2 tablets (650 mg) by mouth every 6 hours as needed for mild pain or other (and adjunct with moderate or severe pain or per patient request)   atorvastatin (LIPITOR) 80 MG tablet 2/15/2023  No Yes   Sig: Take 1 tablet by mouth daily. Pt needs to have labs done prior to further refills.   bisacodyl (DULCOLAX) 10 MG suppository Unknown  Yes Yes   Sig: Place 10 mg rectally daily as needed for constipation   diclofenac (VOLTAREN) 1 % topical gel 2/16/2023 at x1  Yes Yes   Sig: Apply 2 g topically 4 times daily   dorzolamide-timolol (COSOPT) 2-0.5 % ophthalmic solution 2/15/2023  No Yes   Sig: Place 1 drop into both eyes 2 times daily   insulin glargine (LANTUS PEN) 100 UNIT/ML pen 2/16/2023  No Yes   Sig: Inject 20 Units Subcutaneous every morning (before breakfast)   insulin lispro (HUMALOG KWIKPEN) 100 UNIT/ML (1 unit dial) KWIKPEN 2/16/2023 at x1  Yes Yes   Sig: Inject Subcutaneous 3 times daily (before meals) 140-189=1 unit  190-239= 2 units  240-289= 3 units  290-339= 4 units  340-399= 5 units  400-999= 6 units   ketoconazole (NIZORAL) 2 % shampoo Past Week  No Yes   Sig: To entire wet scalp and ears and then wash off after 5 minutes three times a week.   latanoprost (XALATAN) 0.005 % ophthalmic solution 2/15/2023  No Yes   Sig: Place 1 drop into both eyes At Bedtime   liraglutide (VICTOZA PEN) 18 MG/3ML solution 2/16/2023  No Yes   Sig: Inject subcu 1.2mg daily.   loperamide (IMODIUM A-D) 2 MG tablet Unknown  Yes Yes   Sig: Take 2 mg by mouth 4 times daily as needed for diarrhea   loratadine (CLARITIN) 10 MG tablet Unknown  Yes Yes   Sig: Take 10 mg by mouth daily as needed for allergies   melatonin 3  MG tablet 2/15/2023 at 1900  Yes Yes   Sig: Take 3 mg by mouth At Bedtime   metFORMIN (GLUCOPHAGE XR) 500 MG 24 hr tablet 2/15/2023  No Yes   Sig: Take 1 tablet (500 mg) by mouth daily (with dinner)   metoprolol tartrate (LOPRESSOR) 100 MG tablet 2/15/2023  Yes Yes   Sig: Take 100 mg by mouth 2 times daily   nitroGLYCERIN (NITROSTAT) 0.4 MG SL tablet Unknown  Yes Yes   Sig: Place 0.4 mg under the tongue every 5 minutes as needed.   olopatadine (PATADAY) 0.2 % ophthalmic solution 2/16/2023  No Yes   Sig: Place 1 drop into both eyes daily For itchy eyes   omeprazole (PRILOSEC) 20 MG DR capsule 2/15/2023  No Yes   Sig: Take 2 capsules (40 mg) by mouth daily   polyvinyl alcohol (LIQUIFILM TEARS) 1.4 % ophthalmic solution 2/16/2023 at x1  Yes Yes   Sig: Place 1 drop into both eyes 3 times daily   senna-docusate (SENOKOT-S/PERICOLACE) 8.6-50 MG tablet Unknown  No Yes   Sig: Take 2 tablets by mouth 2 times daily as needed for constipation   simethicone (MYLICON) 125 MG chewable tablet Unknown  Yes Yes   Sig: Take 125 mg by mouth 4 times daily as needed After meals and HS prn   traMADol (ULTRAM) 50 MG tablet 2/15/2023  Yes Yes   Sig: Take 25 mg by mouth 2 times daily   traMADol (ULTRAM) 50 MG tablet 2/16/2023 at 0430  Yes Yes   Sig: Take 25 mg by mouth 2 times daily as needed for severe pain (7-10)   traZODone (DESYREL) 50 MG tablet 2/15/2023 at 1900  No Yes   Sig: Take 1 tablet (50 mg) by mouth At Bedtime      Facility-Administered Medications: None        Review of Systems    Review of systems not obtained due to patient factors - dementia and limited verbal communication.      Physical Exam   Vital Signs: Temp: 97.9  F (36.6  C) Temp src: Oral BP: (!) 185/69 Pulse: 105   Resp: 18 SpO2: 99 % O2 Device: None (Room air)    Weight: 0 lbs 0 oz    General Appearance: Awake, alert, +Dementia  Respiratory: Poor inspiratory effort, Clear, diminished breath sounds in bases  Cardiovascular: Regular rate, S1S2  GI: +BS, soft, NT,  ND  Skin: Sacral decubitus ulcer with foul smelling discharge measuring 3cm in diameter. Possibly stage IV.  Other: No pedal edema     Medical Decision Making     65 MINUTES SPENT BY ME on the date of service doing chart review, history, exam, documentation & further activities per the note.      Data     I have personally reviewed the following data over the past 24 hrs:    14.1 (H)  \   10.5 (L)   / 369     132 (L) 93 (L) 34.6 (H) /  324 (H)   4.2 20 (L) 0.93 \       ALT: 36 (H) AST: 54 (H) AP: 318 (H) TBILI: 0.8   ALB: 2.9 (L) TOT PROTEIN: 8.0 LIPASE: N/A       Procal: N/A CRP: 337.70 (H) Lactic Acid: 3.6 (H)         Imaging results reviewed over the past 24 hrs:   Recent Results (from the past 24 hour(s))   CT Pelvis Soft Tissue w Contrast   Result Value    Radiologist flags Necrotizing fasciitis (AA)    Narrative    EXAM: CT PELVIS SOFT TISSUE W CONTRAST  LOCATION: [facility]  DATE/TIME: 2/16/2023 6:31 PM    INDICATION: Malodorous wounds to left buttock and right hip, severe sepsis on lab testing today  COMPARISON: 1/3/2023  TECHNIQUE: CT scan of the pelvis was performed with IV contrast. Multiplanar reformats were obtained. Dose reduction techniques were used.  CONTRAST: 100ml isovue 370    FINDINGS: Mild motion artifact.    PELVIC ORGANS: Increased circumferential bladder wall thickening measuring up to 0.8 cm is consistent with cystitis. Hysterectomy. Atherosclerotic vascular disease.    MUSCULOSKELETAL: A new sacral decubitus ulcer with subcutaneous emphysema extending into the surrounding soft tissues with associated inflammatory changes. A new decubitus ulcer at the right greater trochanter with surrounding inflammatory changes. No   loculated drainable fluid collection. No aggressive osseous erosion. Degenerative changes of the spine and hips.      Impression    IMPRESSION:  1.  New sacral decubitus ulcer with subcutaneous emphysema extending into the surrounding soft tissues.  This could represent necrotizing fasciitis.  2.  A new right greater trochanter decubitus ulcer.  3.  Cystitis.      [Critical Result: Necrotizing fasciitis]    Finding was identified on 2/16/2023 6:35 PM.     Dr. Singer was contacted by me on 2/16/2023 6:42 PM and verbalized understanding of the critical result.

## 2023-02-17 NOTE — CONSULTS
Care Management Initial Consult    General Information  Assessment completed with: Camron Paniagua  Type of CM/SW Visit: Initial Assessment    Primary Care Provider verified and updated as needed: Yes   Readmission within the last 30 days: planned readmission   Return Category: Planned Surgery     Advance Care Planning: Advance Care Planning Reviewed: no concerns identified          Communication Assessment  Patient's communication style: spoken language (English or Bilingual)    Hearing Difficulty or Deaf: no   Wear Glasses or Blind: yes    Cognitive  Cognitive/Neuro/Behavioral: .WDL except, orientation, level of consciousness  Level of Consciousness: alert, confused  Arousal Level: arouses to voice  Orientation: disoriented to, time, situation  Mood/Behavior: cooperative, calm  Best Language: 0 - No aphasia  Speech: slow    Living Environment:   People in home: alone     Current living Arrangements: assisted living  [facility] of Facility[facility] [place]. (Just recently stayed at [facility] and does not want pt back there.)   Able to return to prior arrangements:  (Family does not want pt to return to [facility])       Family/Social Support:  Care provided by: self, other (see comments) (Facility staff)  Provides care for: no one, unable/limited ability to care for self  Marital Status: Single  Children          Description of Support System: Supportive         Current Resources:   Patient receiving home care services: No     Community Resources: None  Equipment currently used at home: lift device, wheelchair, manual  Supplies currently used at home:      Employment/Financial:  Employment Status:          Financial Concerns:             Lifestyle & Psychosocial Needs:  Social Determinants of Health     Tobacco Use: Medium Risk     Smoking Tobacco Use: Former     Smokeless Tobacco Use: Never     Passive Exposure: Not on file   Alcohol Use: Not on file   Financial Resource Strain: Not on file   Food  Insecurity: Not on file   Transportation Needs: Not on file   Physical Activity: Not on file   Stress: Not on file   Social Connections: Not on file   Intimate Partner Violence: Not on file   Depression: Not at risk     PHQ-2 Score: 1   Housing Stability: Not on file       Functional Status:  Prior to admission patient needed assistance:   Dependent ADLs:: Bathing, Dressing, Eating, Grooming, Incontinence, Transfers, Positioning, Wheelchair-independent, Toileting  Dependent IADLs:: Cleaning, Cooking, Laundry, Shopping, Meal Preparation, Medication Management, Transportation, Incontinence       Mental Health Status:          Chemical Dependency Status:                Values/Beliefs:  Spiritual, Cultural Beliefs, Caodaism Practices, Values that affect care:                 Additional Information:  Assessed. Spoke to son Camron. PT was staying at [facility]. They do not want pt to return to [facility] . Pt before that lived at [facility] since 2011, she switched facilities to be closer to her daughter April who is now staying in LTC at [facility]. They would prefer if pt went back to [facility] facility. Pt has manual wheelchair, and uses lifts device for transfers. Camron directed writer to call sister Alexandra for more choices. Writer attempts, but phone just kept ringing unable to leave . CM to follow up with family on more choices if [facility] cannot accommodate pt for LTC.  Health Transport will likely be needed.    CM will assist in sending referral to [facility] LTC, but if pt becomes medically ready and no accepting bed, may have to return to [facility], and see if SW there can coordinate switch.       CM will continue to follow plan of care, review recommendations, and assist with any discharge needs anticipated.     Jessica Wilkerson RN

## 2023-02-17 NOTE — PROGRESS NOTES
TRANSITIONS OF CARE (JOSE D) LOG   JOSE D tasks should be completed by the CC within one (1) business day of notification of each transition. Follow up contact with member is required after return to their usual care setting.  Note:  If CC finds out about the transitions fifteen (15) days or more after the member has returned to their usual care setting, no JOSE D log is needed. However, the CC should check in with the member to discuss the transition process, any changes needed to the care plan and document it in a case note.    Member Name:  Cristal Preciado O Name:  Prashant O/Health Plan Member ID#: [id number]    Product[place] [facility] [place] Coordinator Contact:  MUNIRA Byrne, RN, PHN, [facility] Agency/County/Care System: [facility]   Transition Communication Actions from Care Management Contact   Transition #1   Notification Date: 2/17/23 Transition Date:   2/16/23 Transition From: Nursing Home, [facility]     Is this the member s usual care setting?               yes Transition To: [facility]   Transition Type:  Unplanned  Reason for Admission/Comments:  Principal Problem:    Severe sepsis (H)  Active Problems:    Wound infection    Sacral wound, initial encounter  Contact member/responsible party to offer assistance with transition Date completed: 2/27/2023    Notes from conversation with the member/responsible party, provider, discharging and receiving facility (as applicable):   Date 2/17/2023:CC contacted Hospital /discharge planner via the [employer] Transitional Care Hand-In Process, with community care plan included.  CC reached out to [facility] regarding transition and offered support as needed.  Reviewed and update care plan as needed.  Notified community service providers and placed services None on hold as needed.  Transition log initiated.   PCP, Aditi Allen, notified of hospitalization via EMR.    Lois Loera, MOSES  Care Coordinator-Long Term  Care  [address] [address]  [email or web address]   [email or web address]     Office: [phone]   Fax: [phone]   Shared CC contact info, care plan/services with receiving setting--Date completed: 2/17/2023   Name & Title of receiving setting contact: [facility]   Notified PCP of transition--Date completed:  2/17/2023     via  EMR  Name of PCP: Aditi Allen     Transition #2   Notification Date: 3/21/2023 Transition Date:   3/21/2023 Transition From: Hospital, [facility]     Is this the member s usual care setting?               no Transition To: Nursing Home, [facility]   Transition Type:  Planned  Reason for Admission/Comments:     Severe sepsis secondary to infected stage IV sacral and right greater trochanter decubitus ulcers with sacral osteomyelitis  Acute blood loss anemia  Type II DM  HTN  Hx of CAD  Hyponatremia, hypokalemia, hypomagnesemia  Hypercalcemia   Pressure ulcer on bilateral heels, unstageable:   History of ulcer, GERD  Hyperlipidemia  Contact member/responsible party to offer assistance with transition: Transition already occurred will contact tcu.     Notes from conversation with the member/responsible party, provider, discharging and receiving facility (as applicable):   Date 3/22/2023:  CC contacted TCU LSW ([phone] for Name and Phone Number) There will be a care Conference tomorrow at 3pm. She will send me a team link to the meeting.    Transition log updated.   PCP, Aditi Allen, notified of transition to TCU via EMR.    Lois Loera, RN  Care Coordinator-Long Term Care  [address] [address]  [email or web address]   [email or web address]     Office: [phone]   Fax: [phone]     Shared CC contact info, care plan/services with receiving setting--Date completed: 3/27/2023   Name & Title of receiving setting  contact: MUKUND JAMES   Notified PCP of transition--Date completed:  3/21/2023     via  EMR  Name of PCP: Aditi Allen      Transition #3   Notification Date: *** Transition Date:   *** Transition From:      Is this the member s usual care setting?                Transition To:    Transition Type:    Reason for Admission/Comments:  ***  Contact member/responsible party to offer assistance with transition Date completed: ***    Notes from conversation with the member/responsible party, provider, discharging and receiving facility (as applicable):   Date ***:     Shared CC contact info, care plan/services with receiving setting--Date completed: ***   Name & Title of receiving setting contact:    Notified PCP of transition--Date completed:  ***     via    Name of PCP: Aditi Allen      Transition #4   Notification Date: *** Transition Date:   *** Transition From:      Is this the member s usual care setting?                Transition To:    Transition Type:    Reason for Admission/Comments:  ***  Contact member/responsible party to offer assistance with transition Date completed: ***    Notes from conversation with the member/responsible party, provider, discharging and receiving facility (as applicable):   Date ***:     Shared CC contact info, care plan/services with receiving setting--Date completed: ***   Name & Title of receiving setting contact:    Notified PCP of transition--Date completed:  ***     via    Name of PCP: Aditi Allen      Transition #5   Notification Date: *** Transition Date:   *** Transition From:      Is this the member s usual care setting?                Transition To:    Transition Type:    Reason for Admission/Comments:  ***  Contact member/responsible party to offer assistance with transition Date completed: ***    Notes from conversation with the member/responsible party, provider, discharging and receiving facility (as applicable):   Date ***:     Shared CC contact info, care  plan/services with receiving setting--Date completed: ***   Name & Title of receiving setting contact:    Notified PCP of transition--Date completed:  ***     via    Name of PCP: Aditi Allen      *RETURN TO USUAL CARE SETTING: *Complete tasks below when the member is discharging TO their usual care setting within one (1) business day of notification..      For situations where the Care Coordinator is notified of the discharge prior to the date of discharge, the Care Coordinator must follow up with the member or designated representative to confirm that discharge actually occurred and discuss required JOSE D tasks as outlined in the JOSE D Instructions.  (This includes situations where it may be a  new  usual care setting for the member. (i.e., a community member who decides upon permanent nursing home placement following hospitalization and rehab).    Discuss with Member/Responsible Party:    Check  Yes  - if the member, family member and/or SNF/facility staff manages the following:    If  No  provide explanation in the comments section.          Date completed: *** Communicated with member or their designated representative about the following:  care transition process; about changes to the member s health status; plan of care updates; education about transitions and how to prevent unplanned transitions/readmissions    Four Pillars for Optimal Transition:    Check  Yes  - if the member, family member and/or SNF/facility staff manages the following:    If  No  provide explanation in the comments section.          []  Yes     []  No Does the member have a follow-up appointment scheduled with primary care or specialist? (Mental health hospitalizations--the appt. should be w/in 7 days)              For mental health hospitalizations:  []  Yes     []  No     Does the member have a follow-up appointment scheduled with a mental health practitioner within 7 days of discharge?  []  Yes     []  No     Has a medication  review been completed with member? If no, refer to PCP, home care nurse, MTM, pharmacist  []  Yes     []  No     Can the member manage their medications or is there a system in place to manage medications (e.g. home care set-up)?         []  Yes     []  No     Can the member verbalize warning signs and symptoms to watch for and how to respond?  []  Yes     []  No     Does the member have a copy of and understand their discharge instructions?  If no, assist to obtain copy of discharge instructions, review discharge instructions, and assist to contact PCP to discuss questions about their recent hospitalization.  []  Yes     []  No     Does the member have adequate food, housing and transportation?  If no, add goal and discuss additional supports available to the member                                                                                                                                                                                 []  Yes     []  No     Is the member safe in their home?  If no, document needs and support provided                                                                                                                                                                          []  Yes     []  No     Are there any concerns of vulnerability, abuse, or neglect?  If yes, document concerns and actions taken by Care Coordinator as a mandated                                                                                                                                                                              []  Yes     []  No     Does the member use a Personal Health Care Record?  Check  Yes  if visit summary, discharge summary, and/or healthcare summary are being used as a PHR.                                                                                                                                                                                  []  Yes     []  No     Have  you reviewed the discharge summary with the member? If  No  provide explanation in comments.  []  Yes     []  No     Have you updated the member s care plan/support plan? Add new diagnosis, medications, treatments, goals & interventions, as applicable. If No, provide explanation in comments.    Comments:           Notes from conversation with the member/responsible party, provider, discharging and receiving facility (as applicable):

## 2023-02-17 NOTE — PROGRESS NOTES
[facility] Care Coordination Contact    [facility]  Ambulatory Care Coordination to Inpatient Care Management   Hand-In Communication    Date:  February 17, 2023  Name: Cristal Preciado is enrolled in [facility] Care Coordination program and I am the Lead Care Coordinator.  CC Contact Information:.   Payor Source: Payor: [facility] / Plan: [facility] DUAL / Product Type: HMO /   Current services in place:     Please see the CC Snaphot and Care Management Flowsheets for specific  details of this Cristal Preciado care plan.   Additional details/specific concerns r/t this admission:    High Utilization multiple hospitalizations for infection.     I will follow this admission in Epic. Please feel free to contact me with questions or for further collaboration in discharge planning.    Lois Loera RN  Care Coordinator-Long Term Care  [address] [address]  [email or web address]   [email or web address]     Office: [phone]   Fax: [phone]

## 2023-02-17 NOTE — PROGRESS NOTES
[facility]    PROGRESS NOTE - Hospitalist Service    Assessment and Plan    Principal Problem:    Severe sepsis (H)  Active Problems:    CAD (coronary artery disease)    Hypertension    Hyperlipidemia    H/O: stroke    Hypomagnesemia    DM type 2 w Neuro/Retin/Nephr -- hgb A1C 7.6 on 1/3/23    Oropharyngeal dysphagia    Cognitive impairment Consistent with Mild Dementia    Wound infection    Sacral wound, initial encounter    Hypokalemia    Hypercalcemia    Cristal Preciado is a 72 year old female with h/o sacral decubitus ulcer (present on admission), CVA, dementia, neuropathy, DM2, CKD, CAD and FTT admitted on 2/16/2023 with infected new sacral decubitus ulcer with possible necrotizing fasciitis.      Sepsis  - Infected Sacral Decubitus ulcer/Possible Necrotizing fasciits,  - Lactic acid= 6.4, WBC= 14.1.  - Surgery seen planning to take to OR this afternoon I&D  - Continue IV Vancomycin, Zosyn and Clindamycin.   - IVF   - NPO for surgery   - trend labs   - follow up intraoperative cultures   - blood cx pending      UTI  - Urinalysis reviewed and consistent with infection.   - on broad spectrum abx   - Ucx pending      Leukocytosis- 2/2 infected sacral decubitus ulcer and UTI.  -  Continue Vancomycin, Zosyn and Clindamycin.   - trend CBC decreased today      Hyponatremia- Mild. Resolved  - IV fluids while NPO.   - trend labs      Hypercalcemia- resolved may have been due to dehydration    Hypokalemia  - replacement protocol getting IV replacement    hypomag  - getting 2gm IV mag       H/o dysphagia and son is not certain of exact diet but he is aware shes on a modified diet, but uncertain of type    Dementia- Surrogate decision makers are daughter- Alexandra Preciado [phone] and son- Camron Preciado [phone].      Clinically Significant Risk Factors                        # Overweight: Estimated body mass index is 28.31 kg/m  as calculated from the following:    Height as of this  "encounter: 1.626 m (5' 4\").    Weight as of this encounter: 74.8 kg (164 lb 14.4 oz)., PRESENT ON ADMISSION         COVID-19 PCR Results    COVID-19 PCR Results 1/14/22 1/18/22 1/21/22 1/25/22 1/28/22 2/1/22 2/4/22 2/8/22 2/11/22 2/15/22   COVID-19 Virus by PCR (External Result)             SARS CoV2 PCR Negative  Negative  Negative  Negative Negative Negative Negative   COVID-19 Virus PCR - Result  NOT DETECTED  NOT DETECTED  NOT DETECTED          Comments are available for some flowsheets but are not being displayed.         COVID-19 Antibody Results, Testing for Immunity    COVID-19 Antibody Results, Testing for Immunity   No data to display.            Code Status: Full Code  VTE prophylaxis:  Pneumatic Compression Devices  DIET: Orders Placed This Encounter      NPO for Medical/Clinical Reasons Except for: Meds    Drains/Lines: none  Weight bearing status: bedrest, per son he does not ambulate   Disposition/Barriers to discharge: pending pos-top course, and family requesting new TCU     Expected Discharge Date:     TBD      Interval History   {Pertinent overnight events  ;none    Subjective:  Son in the room and patient is hungry. Plan for surgery this afternoon     PHYSICAL EXAM  Temp:  [97.9  F (36.6  C)-98.5  F (36.9  C)] 98.5  F (36.9  C)  Pulse:  [] 100  Resp:  [16-22] 22  BP: (107-185)/(49-72) 107/49  SpO2:  [96 %-100 %] 100 %  Wt Readings from Last 1 Encounters:   02/17/23 78.1 kg (172 lb 2.9 oz)       Intake/Output Summary (Last 24 hours) at 2/17/2023 0840  Last data filed at 2/17/2023 0646  Gross per 24 hour   Intake 3450 ml   Output 730 ml   Net 2720 ml      Body mass index is 26.97 kg/m .    Physical Exam  Constitutional:       Comments: Chronically ill-appearing NAD   HENT:      Mouth/Throat:      Mouth: Mucous membranes are dry.   Cardiovascular:      Rate and Rhythm: Regular rhythm. Tachycardia present.      Pulses: Normal pulses.   Pulmonary:      Effort: Pulmonary effort is normal.      " Breath sounds: Normal breath sounds.   Abdominal:      General: Bowel sounds are normal.      Palpations: Abdomen is soft.   Skin:     Comments: wound covered   Neurological:      Mental Status: She is alert. Mental status is at baseline. She is disoriented.       PERTINENT LABS/IMAGING:  Results for orders placed or performed during the hospital encounter of 02/16/23   CT Pelvis Soft Tissue w Contrast   Result Value Ref Range    Radiologist flags Necrotizing fasciitis (AA)     Impression    IMPRESSION:  1.  New sacral decubitus ulcer with subcutaneous emphysema extending into the surrounding soft tissues. This could represent necrotizing fasciitis.  2.  A new right greater trochanter decubitus ulcer.  3.  Cystitis.      [Critical Result: Necrotizing fasciitis]    Finding was identified on 2/16/2023 6:35 PM.     Dr. Singer was contacted by me on 2/16/2023 6:42 PM and verbalized understanding of the critical result.          Recent Labs   Lab 02/17/23  1217 02/17/23  1035 02/17/23  0752 02/17/23  0630 02/16/23  1939 02/16/23  1431   WBC  --   --   --  9.4  --  14.1*   HGB  --   --   --  8.0*  --  10.5*   MCV  --   --   --  93  --  94   PLT  --   --   --  274  --  369   NA  --  136  --   --   --  132*   POTASSIUM  --  2.8*  --   --   --  4.2   CHLORIDE  --  103  --   --   --  93*   CO2  --  23  --   --   --  20*   BUN  --  18.1  --   --   --  34.6*   CR  --  0.75  --   --   --  0.93   ANIONGAP  --  10  --   --   --  19*   OLAF  --  8.5*  --   --   --  10.3*   * 142* 133*  --    < > 440*   ALBUMIN  --  1.8*  --   --   --  2.9*   PROTTOTAL  --  5.7*  --   --   --  8.0   BILITOTAL  --  0.6  --   --   --  0.8   ALKPHOS  --  184*  --   --   --  318*   ALT  --  22  --   --   --  36*   AST  --  35  --   --   --  54*    < > = values in this interval not displayed.     Recent Labs   Lab Test 11/18/21  0544   CHOL 92   HDL 26*   LDL 30   TRIG 180*     Recent Labs   Lab Test 02/17/23  0752 02/16/23  1939 02/16/23  1431    NA  --   --  132*   POTASSIUM  --   --  4.2   CHLORIDE  --   --  93*   CO2  --   --  20*   *   < > 440*   BUN  --   --  34.6*   CR  --   --  0.93   GFRESTIMATED  --   --  66   OLAF  --   --  10.3*    < > = values in this interval not displayed.     Recent Labs   Lab Test 01/03/23  2250 08/17/22  0532 06/17/21  0553   A1C 7.6* 6.9* 8.1*     Recent Labs   Lab Test 02/17/23  0630 02/16/23  1431 02/03/23  0602   HGB 8.0* 10.5* 11.6*     Recent Labs   Lab Test 04/24/18  1021 04/24/18  0348 04/24/18  0006   TROPONINI <0.01 <0.01 <0.01     Recent Labs   Lab Test 07/23/19  1158   NTBNP 54     Recent Labs   Lab Test 02/03/23  0602   TSH 0.82     No results for input(s): INR in the last 91750 hours.    25 MINUTES SPENT BY ME on the date of service doing chart review, history, exam, documentation & further activities per the note.  Niki Car MD  [facility]  [phone]

## 2023-02-17 NOTE — ANESTHESIA PREPROCEDURE EVALUATION
Anesthesia Pre-Procedure Evaluation    Patient: Cristal Preciado   MRN: [id number] : 1950        Procedure : Procedure(s):  DEBRIDEMENT OF SACRAL ULCER, POSSIBLE DEBRIDEMENT OF RIGHT HIP ULCER          Past Medical History:   Diagnosis Date     AION (anterior ischaemic optic neuropathy), left eye     NAION LE     CAD (coronary artery disease) 3/2009    [facility]; Angio  UM- normal coronary arteries     Cataract      CVA (cerebral vascular accident) (H)     admitted at [facility]     on Cymbalta     Diabetes mellitus, type 2 (H)      Diabetic nephropathy (H)      Diabetic neuropathy (H)     severe     Diabetic retinopathy (H)      GERD (gastroesophageal reflux disease)      Hyperlipidemia      Hypertension     ECHO , TDS, NL EF     Infection due to  novel coronavirus 2023     POAG (primary open-angle glaucoma)     adv BE     Seasonal allergies      Tubular adenoma of colon     repeat colonoscopy in       Past Surgical History:   Procedure Laterality Date     CARDIAC CATHETERIZATION       CATARACT EXTRACTION W/ INTRAOCULAR LENS IMPLANT Bilateral       SECTION        SECTION       COLONOSCOPY  7/15/2013    Tubular adenoma; repeat in ;Procedure: COMBINED COLONOSCOPY, SINGLE BIOPSY/POLYPECTOMY BY BIOPSY;;  Surgeon: Don King MD;  Tubular adenoma     COLONOSCOPY N/A 2020    Procedure: COLONOSCOPY;  Surgeon: Sid Amanda MD;  Location: [facility] GI     CORONARY STENT PLACEMENT  2004    RCA     DAVINCI HYSTERECTOMY TOTAL, BILATERAL SALPINGO-OOPHORECTOMY, COMBINED N/A 2019    Procedure: DaVinci Assisted Total Laparoscopic Hysterectomy, Removal Of Both Tubes And Ovaries;  Surgeon: Linh Cardoso MD;  Location: [facility] OR     EXTRACAPSULAR CATARACT EXTRATION WITH INTRAOCULAR LENS IMPLANT  11-10-09, 2-9-10    11-10-09 Lt, 2-9-10 Rt; Left eye 2012     HYSTERECTOMY TOTAL ABDOMINAL, BILATERAL SALPINGO-OOPHORECTOMY, COMBINED   2019    Procedure: DaVinci Assisted Total Laparoscopic Hysterectomy, Removal Of Both Tubes And Ovaries; Surgeon: Linh Cardoso MD; Location: [facility] OR       STENT, CORONARY, DELORES  2009    RCA      Allergies   Allergen Reactions     Dust Mites Other (See Comments)     Sneezing runny eyes and nose.     Food Allergy Formula Hives     Mountain Dew and Walnuts     Pollen Extract Other (See Comments)     Sneezing runny eyes and nose.      Social History     Tobacco Use     Smoking status: Former     Packs/day: 1.50     Years: 45.00     Pack years: 67.50     Types: Cigarettes     Start date: 1968     Quit date: 3/6/2013     Years since quittin.9     Smokeless tobacco: Never   Substance Use Topics     Alcohol use: Not Currently      Wt Readings from Last 1 Encounters:   23 78.1 kg (172 lb 2.9 oz)        Anesthesia Evaluation            ROS/MED HX  ENT/Pulmonary:     (+) tobacco use, Past use, mild,  COPD,     Neurologic:     (+) delerium, resolved Yes. CVA,     Cardiovascular:     (+) hypertension--CAD --stent-    METS/Exercise Tolerance:     Hematologic:  - neg hematologic  ROS     Musculoskeletal:       GI/Hepatic:     (+) GERD,     Renal/Genitourinary:     (+) renal disease, type: CRI,     Endo:     (+) type II DM, Diabetic complications: nephropathy neuropathy retinopathy.     Psychiatric/Substance Use:     (+) psychiatric history anxiety and depression     Infectious Disease:       Malignancy:       Other:            Physical Exam    Airway        Mallampati: III    Neck ROM: full     Respiratory Devices and Support         Dental       (+) Full dentures      Cardiovascular          Rhythm and rate: regular     Pulmonary           breath sounds clear to auscultation           OUTSIDE LABS:  CBC:   Lab Results   Component Value Date    WBC 9.4 2023    WBC 14.1 (H) 2023    HGB 8.0 (L) 2023    HGB 10.5 (L) 2023    HCT 24.5 (L) 2023    HCT 32.9 (L) 2023      02/17/2023     02/16/2023     BMP:   Lab Results   Component Value Date     02/17/2023     (L) 02/16/2023    POTASSIUM 2.8 (L) 02/17/2023    POTASSIUM 4.2 02/16/2023    CHLORIDE 103 02/17/2023    CHLORIDE 93 (L) 02/16/2023    CO2 23 02/17/2023    CO2 20 (L) 02/16/2023    BUN 18.1 02/17/2023    BUN 34.6 (H) 02/16/2023    CR 0.75 02/17/2023    CR 0.93 02/16/2023     (H) 02/17/2023     (H) 02/17/2023     COAGS:   Lab Results   Component Value Date    PTT 28 09/14/2010    INR 1.12 03/28/2013     POC:   Lab Results   Component Value Date     (H) 01/24/2020     HEPATIC:   Lab Results   Component Value Date    ALBUMIN 1.8 (L) 02/17/2023    PROTTOTAL 5.7 (L) 02/17/2023    ALT 22 02/17/2023    AST 35 02/17/2023    ALKPHOS 184 (H) 02/17/2023    BILITOTAL 0.6 02/17/2023     OTHER:   Lab Results   Component Value Date    PH 7.42 02/17/2023    LACT 3.6 (H) 02/16/2023    A1C 7.6 (H) 01/03/2023    OLAF 8.5 (L) 02/17/2023    PHOS 2.1 (L) 08/06/2019    MAG 1.6 (L) 02/17/2023    LIPASE 70 03/19/2008    TSH 0.82 02/03/2023    T4 10.6 10/30/2008    T3 100 04/21/2006    CRP <5.0 07/15/2010     (H) 02/16/2023       Anesthesia Plan    ASA Status:  3   NPO Status:  NPO Appropriate    Anesthesia Type: General.     - Airway: ETT      Maintenance: Balanced.        Consents    Anesthesia Plan(s) and associated risks, benefits, and realistic alternatives discussed. Questions answered and patient/representative(s) expressed understanding.     - Discussed: Risks, Benefits and Alternatives for the PROCEDURE were discussed     - Discussed with:  Patient      - Patient is DNR/DNI Status: No         Postoperative Care    Pain management: Multi-modal analgesia.   PONV prophylaxis: Ondansetron (or other 5HT-3), Dexamethasone or Solumedrol     Comments:    Other Comments:     Rosa Maria Lin MD

## 2023-02-17 NOTE — PLAN OF CARE
Problem: Plan of Care - These are the overarching goals to be used throughout the patient stay.    Goal: Optimal Comfort and Wellbeing  Outcome: Progressing  Intervention: Monitor Pain and Promote Comfort  Recent Flowsheet Documentation  Taken 2/17/2023 0025 by Raffy Figueroa RN  Pain Management Interventions: repositioned     Problem: Risk for Delirium  Goal: Optimal Coping  Outcome: Progressing   Goal Outcome Evaluation:  Patient's vitals stable over night. She did require one dose of morphine for shoulder pain. She continues to be alert to self only. She is yet to be seen by surgery but they are planning on debriding her sacral wound today. She is NPO since midnight. Turned Q 2 hours. A consult for wound nurse to see her today.     BP (!) 145/65 (BP Location: Right arm)   Pulse 89   Temp 98.1  F (36.7  C) (Oral)   Resp 20   SpO2 99%     RAFFY FIGUEROA RN

## 2023-02-17 NOTE — PHARMACY-VANCOMYCIN DOSING SERVICE
"Pharmacy Vancomycin Initial Note  Date of Service 2023  Patient's  1950  72 year old, female    Indication: Skin and Soft Tissue Infection    Current estimated CrCl = Estimated Creatinine Clearance: 55.2 mL/min (based on SCr of 0.93 mg/dL).    Creatinine for last 3 days  2023:  2:31 PM Creatinine 0.93 mg/dL    Recent Vancomycin Level(s) for last 3 days  No results found for requested labs within last 72 hours.      Vancomycin IV Administrations (past 72 hours)                   vancomycin (VANCOCIN) 1,250 mg in sodium chloride 0.9 % 250 mL intermittent infusion (mg) 1,250 mg New Bag 23 1639                Nephrotoxins and other renal medications (From now, onward)    Start     Dose/Rate Route Frequency Ordered Stop    23 2130  piperacillin-tazobactam (ZOSYN) 3.375 g vial to attach to  mL bag        Note to Pharmacy: For [facility], [facility] and [facility]: For Zosyn-naive patients, use the \"Zosyn initial dose + extended infusion\" order panel.    3.375 g  over 240 Minutes Intravenous EVERY 8 HOURS 23 2100  vancomycin (VANCOCIN) 1000 mg in dextrose 5% 200 mL PREMIX         1,000 mg  200 mL/hr over 1 Hours Intravenous EVERY 12 HOURS 23            Contrast Orders - past 72 hours (72h ago, onward)    Start     Dose/Rate Route Frequency Stop    23 1900  iopamidol (ISOVUE-370) solution 100 mL         100 mL Intravenous ONCE 23 1832          [employer] Prediction of Planned Initial Vancomycin Regimen  Loading dose: N/A  Regimen: 1250 mg IV every 24 hours.  Start time: 04:39 on 2023  Exposure target: AUC24 (range)400-600 mg/L.hr   AUC24,ss: 504 mg/L.hr  Probability of AUC24 > 400: 76 %  Ctrough,ss: 14.5 mg/L  Probability of Ctrough,ss > 20: 22 %  Probability of nephrotoxicity (Lodise ZIGGY ): 10 %        Plan:  1. Start vancomycin  1250 mg IV q24h.   2. Vancomycin monitoring method: AUC  3. Vancomycin therapeutic monitoring goal: 400-600 " mg*h/L  4. Pharmacy will check vancomycin levels as appropriate in 1-3 Days.    5. Serum creatinine levels will be ordered daily for the first week of therapy and at least twice weekly for subsequent weeks.      Sejal Trammell, PharmD, BCPS 02/16/23 9:00 PM

## 2023-02-18 PROBLEM — D62 ANEMIA DUE TO BLOOD LOSS, ACUTE: Status: ACTIVE | Noted: 2023-01-01

## 2023-02-18 NOTE — PROGRESS NOTES
Patient is comfortable until we move her then she is in significant discomfort.  Afebrile, vitals are stable.  She is not tachycardic.    Physical exam:  Both wounds are deep and for the most part looks okay today.  It is a little hard to tell if there is any further necrotic tissue because there is some dried blood and because I had to use some hemostatic agents which will cause some darkness to the tissue.  Too early to know if these are going to start granulating and or not.  No surrounding signs of infection.    Laboratories:  White blood count 10  Albumin yesterday was 1.8    Impression: Significant decubitus ulcers.  I discussed this with my PA who actually saw the patient just a couple weeks ago and commented how quickly these had advanced over just a couple weeks time.  It is hard to believe that this can all be blamed on inadequate care.  I have to think that part of the problem is just how deconditioned she is.  I explained this to the family that sometimes healing in somebody with multiple medical problems can be difficult.  I had a beka discussion with him last night that these wounds are exceedingly large and deep and may never heal even under the best of circumstances.  This could take multiple debridements in the operating room.  In addition, she also has eschars on both heels that we will also likely eventually need some sort of surgical debridement.  However, there is no signs of infection there so for right now I would leave those alone.  If this starts turning into multiple debridements then I think that there should be a discussion with the family about the goals of care.  Again I prepared them for this last night after getting out of the operating room.    Today her wounds look pretty good and I think there is every reason we should keep moving forward with cares with the hope of getting them healed.  If they continue to look good then I will try to get her into a wound VAC sometime this  week.

## 2023-02-18 NOTE — ANESTHESIA PROCEDURE NOTES
Airway       Patient location during procedure: OR       Procedure Start/Stop Times: 2/17/2023 8:14 PM  Staff -        CRNA: Haley Nugent APRN CRNA       Performed By: CRNA  Consent for Airway        Urgency: elective  Indications and Patient Condition       Indications for airway management: lloyd-procedural       Induction type:intravenous       Mask difficulty assessment: 1 - vent by mask    Final Airway Details       Final airway type: endotracheal airway       Successful airway: ETT - single  Endotracheal Airway Details        ETT size (mm): 7.0       Cuffed: yes       Successful intubation technique: direct laryngoscopy       DL Blade Type: Rubio 2       Grade View of Cords: 1       Adjucts: stylet       Position: Right       Measured from: lips       Secured at (cm): 22       Bite block used: None    Post intubation assessment        Placement verified by: capnometry, equal breath sounds and chest rise        Number of attempts at approach: 1       Number of other approaches attempted: 0       Secured with: silk tape       Ease of procedure: easy       Dentition: Unchanged    Medication(s) Administered   Medication Administration Time: 2/17/2023 8:14 PM

## 2023-02-18 NOTE — SIGNIFICANT EVENT
Significant Event Note    Time of event: 3:38 AM February 18, 2023    Description of event:  Paged by RN regarding lab results. Patent admitted for sepsis secondary to sacral decubitus ulcer and possible necrotizing fasciitis. Patient underwent debridement of ulcer today, wound debrided down to bone. CBC drawn early tonight, Hb returned back at 5.7. Patient sleeping when examined, unable to determine ROS secondary to dementia, though patient appears to be resting comfortably. Son Camron at bedside able to be surrogate decision maker for patient. Vitals otherwise stable.    Plan:  Plan for transfusion 2 units pRBC. Discussed risks and benefits with angel Lyon at bedside, who was able to sign consent form.  - pRBC 2 units  - type and cross  - repeat Hb in AM after blood is done running  - monitor closely    Discussed with: bedside nurse    Evie Muñiz MD

## 2023-02-18 NOTE — CONSULTS
[facility]    Infectious Disease Consultation     Date of Admission:  2/16/2023  Date of Consult (When I saw the patient): 02/18/23    Assessment & Plan   Cristal Preciado is a 72 year old female who was admitted on 2/16/2023.     Impression:  1. CVA, sacral decubitus ulcer present on admission status post debridement of sacral ulcer, debridement of right hip ulcer on 2/17/2023:  2. Osteomyelitis: Both go down to bone  3. infected decubitus ulcer-Proteus, Enterococcus, E. coli, cultures in process, susceptibility pending  4. Multiple comorbid conditions: Diabetes mellitus, cognitive impairment, sacral wounds, coronary disease, hypertension, hyperlipidemia    Recommendations:  1. Follow culture and susceptibility results  2. Zosyn and vancomycin  3. Stop clindamycin  4. Patient will need IV antibiotics for 4 to 6 weeks due to osteomyelitis, see operative report.  PICC once blood cultures are negative for 72 hours (around 2/20/2023)  5. Care coordinator consulted patient's son and daughter updated  6. Discussed with hospitalist, appreciate input  7. Thank you for consulting infectious disease.  Will follow on 2/20/2023.  Please call with questions over the weekend    Cecily Marcos MD  [facility]  Answering Service: [phone]  On-Call ID provider: [phone], option: [phone]                Reason for Consult   Reason for consult: I was asked to evaluate this patient for please assist in abx for wound infection    Primary Care Physician   Aditi Allen    Chief Complaint     Foul smell, drainage from sacral decubitus ulcers    History is obtained from the patient and medical records    History of Present Illness   Cristal Preciado is a 72 year old female who presents with history of CVA, dementia, neuropathy, diabetes mellitus, chronic kidney disease, failure to thrive, admitted with worsening sacral decubitus ulcers, patient has limited verbal communication  Patient's  son is at bedside  Patient was admitted from TCU, and the TCU staff noticed foul-smelling drainage from sacral wound and patient sent to ED for further evaluation  Underwent debridement, cultures taken, ulcers go down to bone  ID is consulted to assist with antibiotic management    Past Medical History   I have reviewed this patient's medical history and updated it with pertinent information if needed.   Past Medical History:   Diagnosis Date     AION (anterior ischaemic optic neuropathy), left eye     NAION LE     CAD (coronary artery disease) 3/2009    [facility]; Angio  UM- normal coronary arteries     Cataract      CVA (cerebral vascular accident) (H)     admitted at [facility]     on Cymbalta     Diabetes mellitus, type 2 (H)      Diabetic nephropathy (H)      Diabetic neuropathy (H)     severe     Diabetic retinopathy (H)      GERD (gastroesophageal reflux disease)      Hyperlipidemia      Hypertension     ECHO , TDS, NL EF     Infection due to 2019 novel coronavirus 2023     POAG (primary open-angle glaucoma)     adv BE     Seasonal allergies      Tubular adenoma of colon 2013    repeat colonoscopy in        Past Surgical History   I have reviewed this patient's surgical history and updated it with pertinent information if needed.  Past Surgical History:   Procedure Laterality Date     CARDIAC CATHETERIZATION       CATARACT EXTRACTION W/ INTRAOCULAR LENS IMPLANT Bilateral       SECTION        SECTION       COLONOSCOPY  7/15/2013    Tubular adenoma; repeat in 2018;Procedure: COMBINED COLONOSCOPY, SINGLE BIOPSY/POLYPECTOMY BY BIOPSY;;  Surgeon: Don King MD;  Tubular adenoma     COLONOSCOPY N/A 2020    Procedure: COLONOSCOPY;  Surgeon: Sid Amanda MD;  Location: [facility] GI     CORONARY STENT PLACEMENT  2004    RCA     DAVINCI HYSTERECTOMY TOTAL, BILATERAL SALPINGO-OOPHORECTOMY, COMBINED N/A 2019    Procedure: DaVinci  Assisted Total Laparoscopic Hysterectomy, Removal Of Both Tubes And Ovaries;  Surgeon: Linh Cardoso MD;  Location: [facility] OR     EXTRACAPSULAR CATARACT EXTRATION WITH INTRAOCULAR LENS IMPLANT  11-10-09, 2-9-10    11-10-09 Lt, 2-9-10 Rt; Left eye 8/2012     HYSTERECTOMY TOTAL ABDOMINAL, BILATERAL SALPINGO-OOPHORECTOMY, COMBINED  08/05/2019    Procedure: DaVinci Assisted Total Laparoscopic Hysterectomy, Removal Of Both Tubes And Ovaries; Surgeon: Linh Cardoso MD; Location: [facility] OR       PICC TRIPLE LUMEN PLACEMENT  2/17/2023          STENT, CORONARY, DELORES  2009    RCA       Prior to Admission Medications   Prior to Admission Medications   Prescriptions Last Dose Informant Patient Reported? Taking?   ASPIRIN LOW DOSE 81 MG chewable tablet 2/15/2023  Yes Yes   Sig: Take 81 mg by mouth daily   DULoxetine (CYMBALTA) 30 MG capsule 2/15/2023  Yes Yes   Sig: Take 90 mg by mouth every morning    White Petrolatum-Mineral Oil (REFRESH P.M.) OINT Unknown  Yes Yes   Sig: Place into both eyes nightly as needed   acetaminophen (TYLENOL) 325 MG tablet 2/15/2023 at 2355  No Yes   Sig: Take 2 tablets (650 mg) by mouth every 6 hours as needed for mild pain or other (and adjunct with moderate or severe pain or per patient request)   atorvastatin (LIPITOR) 80 MG tablet 2/15/2023  No Yes   Sig: Take 1 tablet by mouth daily. Pt needs to have labs done prior to further refills.   bisacodyl (DULCOLAX) 10 MG suppository Unknown  Yes Yes   Sig: Place 10 mg rectally daily as needed for constipation   diclofenac (VOLTAREN) 1 % topical gel 2/16/2023 at x1  Yes Yes   Sig: Apply 2 g topically 4 times daily   dorzolamide-timolol (COSOPT) 2-0.5 % ophthalmic solution 2/15/2023  No Yes   Sig: Place 1 drop into both eyes 2 times daily   insulin glargine (LANTUS PEN) 100 UNIT/ML pen 2/16/2023  No Yes   Sig: Inject 20 Units Subcutaneous every morning (before breakfast)   insulin lispro (HUMALOG KWIKPEN) 100 UNIT/ML (1 unit dial) KWIKPEN 2/16/2023 at  x1  Yes Yes   Sig: Inject Subcutaneous 3 times daily (before meals) 140-189=1 unit  190-239= 2 units  240-289= 3 units  290-339= 4 units  340-399= 5 units  400-999= 6 units   ketoconazole (NIZORAL) 2 % shampoo Past Week  No Yes   Sig: To entire wet scalp and ears and then wash off after 5 minutes three times a week.   latanoprost (XALATAN) 0.005 % ophthalmic solution 2/15/2023  No Yes   Sig: Place 1 drop into both eyes At Bedtime   liraglutide (VICTOZA PEN) 18 MG/3ML solution 2/16/2023  No Yes   Sig: Inject subcu 1.2mg daily.   loperamide (IMODIUM A-D) 2 MG tablet Unknown  Yes Yes   Sig: Take 2 mg by mouth 4 times daily as needed for diarrhea   loratadine (CLARITIN) 10 MG tablet Unknown  Yes Yes   Sig: Take 10 mg by mouth daily as needed for allergies   melatonin 3 MG tablet 2/15/2023 at 1900  Yes Yes   Sig: Take 3 mg by mouth At Bedtime   metFORMIN (GLUCOPHAGE XR) 500 MG 24 hr tablet 2/15/2023  No Yes   Sig: Take 1 tablet (500 mg) by mouth daily (with dinner)   metoprolol tartrate (LOPRESSOR) 100 MG tablet 2/15/2023  Yes Yes   Sig: Take 100 mg by mouth 2 times daily   nitroGLYCERIN (NITROSTAT) 0.4 MG SL tablet Unknown  Yes Yes   Sig: Place 0.4 mg under the tongue every 5 minutes as needed.   olopatadine (PATADAY) 0.2 % ophthalmic solution 2/16/2023  No Yes   Sig: Place 1 drop into both eyes daily For itchy eyes   omeprazole (PRILOSEC) 20 MG DR capsule 2/15/2023  No Yes   Sig: Take 2 capsules (40 mg) by mouth daily   polyvinyl alcohol (LIQUIFILM TEARS) 1.4 % ophthalmic solution 2/16/2023 at x1  Yes Yes   Sig: Place 1 drop into both eyes 3 times daily   senna-docusate (SENOKOT-S/PERICOLACE) 8.6-50 MG tablet Unknown  No Yes   Sig: Take 2 tablets by mouth 2 times daily as needed for constipation   simethicone (MYLICON) 125 MG chewable tablet Unknown  Yes Yes   Sig: Take 125 mg by mouth 4 times daily as needed After meals and HS prn   traMADol (ULTRAM) 50 MG tablet 2/15/2023  Yes Yes   Sig: Take 25 mg by mouth 2 times  daily   traMADol (ULTRAM) 50 MG tablet 2/16/2023 at 0430  Yes Yes   Sig: Take 25 mg by mouth 2 times daily as needed for severe pain (7-10)   traZODone (DESYREL) 50 MG tablet 2/15/2023 at 1900  No Yes   Sig: Take 1 tablet (50 mg) by mouth At Bedtime      Facility-Administered Medications: None     Allergies   Allergies   Allergen Reactions     Dust Mites Other (See Comments)     Sneezing runny eyes and nose.     Food Allergy Formula Hives     Mountain Dew and Walnuts     Pollen Extract Other (See Comments)     Sneezing runny eyes and nose.       Immunization History   Immunization History   Administered Date(s) Administered     COVID-19 Vaccine 18+ (Moderna) 12/30/2020, 01/27/2021, 11/23/2021, 05/09/2022     COVID-19 Vaccine Bivalent Booster 12+ (Pfizer) 09/23/2022     FLU 6-35 months 12/03/2009, 09/24/2010     Flu, Unspecified 10/26/2016, 10/12/2017, 10/15/2018, 10/11/2019, 10/22/2020     Influenza (High Dose) 3 valent vaccine 10/15/2018, 10/11/2019, 10/22/2020, 10/18/2021, 10/20/2022     Influenza (IIV3) PF 11/04/2003, 11/18/2004, 11/10/2005, 10/19/2006, 12/03/2009, 10/24/2011, 11/01/2012, 10/21/2015     Influenza Vaccine >6 months (Alfuria,Fluzone) 09/24/2014     Influenza Vaccine IM Ages 6-35 Months 4 Valent (PF) 10/18/2021     Pneumo Conj 13-V (2010&after) 07/21/2016     Pneumococcal (PCV 7) 11/04/2003     Pneumococcal 23 valent 11/04/2003, 10/24/2011     TD (ADULT, 7+) 05/13/2003     TDAP Vaccine (Boostrix) 06/12/2014     Tdap (Adacel,Boostrix) 06/12/2014     Zoster vaccine, live 12/12/2013       Social History   I have reviewed this patient's social history and updated it with pertinent information if needed. Cristal Preciado  reports that she quit smoking about 9 years ago. Her smoking use included cigarettes. She started smoking about 55 years ago. She has a 67.50 pack-year smoking history. She has never used smokeless tobacco. She reports that she does not currently use alcohol. She reports that she does not  use drugs.    Family History   I have reviewed this patient's family history and updated it with pertinent information if needed.   Family History   Problem Relation Age of Onset     Hypertension Mother      Cerebrovascular Disease Mother      Glaucoma Father      Cancer Father      Diabetes Sister      Glaucoma Sister      Cancer - colorectal Other      Cerebrovascular Disease Other      Skin Cancer No family hx of      Melanoma No family hx of      Anesthesia Reaction No family hx of      Deep Vein Thrombosis (DVT) No family hx of      Arthritis Brother      Diabetes Sister      Glaucoma Sister        Review of Systems   The 10 point Review of Systems is negative other than noted in the HPI or here.     Physical Exam   Temp: 97.8  F (36.6  C) Temp src: Oral BP: 132/59 Pulse: 70   Resp: 17 SpO2: 100 % O2 Device: None (Room air) Oxygen Delivery: 3 LPM  Vital Signs with Ranges  Temp:  [97.1  F (36.2  C)-99.9  F (37.7  C)] 97.8  F (36.6  C)  Pulse:  [] 70  Resp:  [12-20] 17  BP: ()/(44-62) 132/59  SpO2:  [95 %-100 %] 100 %  172 lbs 2.87 oz  Body mass index is 26.97 kg/m .    GENERAL APPEARANCE:  awake, NAD  EYES: Eyes grossly normal to inspection, conjunctivae and sclerae normal  HENT: mouth without ulcers or lesions  NECK: supple  RESP: normal breathing pattern  CV: regular rates and rhythm  LYMPHATICS:swelling  ABDOMEN: Firm  MS: Sacral decubitus ulcer, hip ulcer, see photos above, dressing in place  SKIN: Infected decubitus ulcer, see photos above  NEURO: CVA, dementia, cognitive impairment, able to answer few questions      LABORATORY DATA:  Reviewed    CBC RESULTS:   Recent Labs   Lab Test 02/18/23  1221 02/18/23  0247   WBC  --  10.0   RBC  --  1.87*   HGB 8.8* 5.7*   HCT  --  17.8*   MCV  --  95   MCH  --  30.5   MCHC  --  32.0   RDW  --  13.8   PLT  --  224        Last Comprehensive Metabolic Panel:  Sodium   Date Value Ref Range Status   02/18/2023 139 136 - 145 mmol/L Final   08/06/2019 130 (L)  133 - 144 mmol/L Final     Potassium   Date Value Ref Range Status   02/18/2023 5.1 3.4 - 5.3 mmol/L Final   02/18/2023 5.1 3.4 - 5.3 mmol/L Final   10/28/2021 3.7 3.5 - 5.0 mmol/L Final   08/06/2019 4.8 3.4 - 5.3 mmol/L Final     Chloride   Date Value Ref Range Status   02/18/2023 109 (H) 98 - 107 mmol/L Final   10/28/2021 103 98 - 107 mmol/L Final   08/06/2019 104 94 - 109 mmol/L Final     Carbon Dioxide   Date Value Ref Range Status   08/06/2019 19 (L) 20 - 32 mmol/L Final     Carbon Dioxide (CO2)   Date Value Ref Range Status   02/18/2023 18 (L) 22 - 29 mmol/L Final   10/28/2021 22 22 - 31 mmol/L Final     Anion Gap   Date Value Ref Range Status   02/18/2023 12 7 - 15 mmol/L Final   10/28/2021 13 5 - 18 mmol/L Final   08/06/2019 8 3 - 14 mmol/L Final     Glucose   Date Value Ref Range Status   10/28/2021 129 (H) 70 - 125 mg/dL Final   08/06/2019 184 (H) 70 - 99 mg/dL Final     GLUCOSE BY METER POCT   Date Value Ref Range Status   02/18/2023 230 (H) 70 - 99 mg/dL Final     Urea Nitrogen   Date Value Ref Range Status   02/18/2023 17.5 8.0 - 23.0 mg/dL Final   10/28/2021 33 (H) 8 - 22 mg/dL Final   08/06/2019 24 7 - 30 mg/dL Final     Creatinine   Date Value Ref Range Status   02/18/2023 0.86 0.51 - 0.95 mg/dL Final   08/06/2019 0.98 0.52 - 1.04 mg/dL Final     GFR Estimate   Date Value Ref Range Status   02/18/2023 72 >60 mL/min/1.73m2 Final     Comment:     eGFR calculated using 2021 CKD-EPI equation.   06/17/2021 47 (L) >60 mL/min/1.73m2 Final   08/06/2019 59 (L) >60 mL/min/[1.73_m2] Final     Comment:     Non  GFR Calc  Starting 12/18/2018, serum creatinine based estimated GFR (eGFR) will be   calculated using the Chronic Kidney Disease Epidemiology Collaboration   (CKD-EPI) equation.       Calcium   Date Value Ref Range Status   02/18/2023 7.7 (L) 8.8 - 10.2 mg/dL Final   08/06/2019 8.8 8.5 - 10.1 mg/dL Final     Bilirubin Total   Date Value Ref Range Status   02/17/2023 0.6 <=1.2 mg/dL Final    03/06/2014 0.2 0.2 - 1.3 mg/dL Final     Alkaline Phosphatase   Date Value Ref Range Status   02/17/2023 184 (H) 35 - 104 U/L Final   03/06/2014 127 40 - 150 U/L Final     ALT   Date Value Ref Range Status   02/17/2023 22 10 - 35 U/L Final   10/12/2015 39 0 - 50 U/L Final     AST   Date Value Ref Range Status   02/17/2023 35 10 - 35 U/L Final   03/06/2014 35 0 - 45 U/L Final             C-Reactive Protein   Date Value Ref Range Status   01/04/2005 0.35 0.00 - 0.80 mg/dL Final     CRP Inflammation   Date Value Ref Range Status   07/15/2010 <5.0 0.0 - 8.0 mg/L Final            MICROBIOLOGY:    Reviewed    Blood cultures  Other Micro:  7-Day Micro Results     Collected Updated Procedure Result Status      02/17/2023 2045 02/17/2023 2127 Anaerobic Bacterial Culture Routine [[id number]]   Abscess from Hip, Right    In process Component Value   No component results               02/17/2023 2045 02/18/2023 0132 Gram Stain [[id number]]   (Abnormal)   Abscess from Hip, Right    Final result Component Value   Gram Stain Result 3+ Gram positive cocci   Gram Stain Result 2+ Gram negative bacilli   Gram Stain Result 4+ WBC seen   Predominantly PMNs            02/17/2023 2045 02/18/2023 1451 Abscess Aerobic Bacterial Culture Routine [[id number]]   (Abnormal)   Abscess from Hip, Right    Preliminary result Component Value   Culture 2+ Gram negative bacilli  [P]     1+ Organism pending id  [P]                02/16/2023 1924 02/18/2023 0534 Urine Culture [[id number]]   Urine, Midstream    Final result Component Value   Culture 10,000-50,000 CFU/mL Mixture of urogenital brittani               02/16/2023 1924 02/18/2023 0641 Urine Culture [[id number]]   Urine, Midstream    Final result Component Value   Culture 50,000-100,000 CFU/mL Mixture of urogenital brittani               02/16/2023 1600 02/17/2023 2201 Blood Culture Peripheral Blood [[id number]]   Peripheral Blood    Preliminary result Component Value   Culture No growth  after 1 day  [P]                02/16/2023 1432 02/18/2023 1201 Wound Aerobic Bacterial Culture Routine [[id number]]   (Abnormal)   Wound from Back, Lower    Preliminary result Component Value   Culture Culture in progress  [P]     1+ Escherichia coli  [P]     1+ Proteus mirabilis  [P]                02/16/2023 1432 02/17/2023 1443 Wound Aerobic Bacterial Culture Routine [[id number]]   (Abnormal)   Wound from Leg, Right    Preliminary result Component Value   Culture Culture in progress  [P]     2+ Proteus mirabilis  [P]     Identification is preliminary, confirmation in progress    1+ Enterococcus faecalis  [P]                02/16/2023 1431 02/17/2023 2201 Blood Culture Line, venous [[id number]]   Blood from Line, venous    Preliminary result Component Value   Culture No growth after 1 day  [P]                    RADIOLOGY:    CT Pelvis Soft Tissue w Contrast    Result Date: 2/16/2023  EXAM: CT PELVIS SOFT TISSUE W CONTRAST LOCATION: [facility] DATE/TIME: 2/16/2023 6:31 PM INDICATION: Malodorous wounds to left buttock and right hip, severe sepsis on lab testing today COMPARISON: 1/3/2023 TECHNIQUE: CT scan of the pelvis was performed with IV contrast. Multiplanar reformats were obtained. Dose reduction techniques were used. CONTRAST: 100ml isovue 370 FINDINGS: Mild motion artifact. PELVIC ORGANS: Increased circumferential bladder wall thickening measuring up to 0.8 cm is consistent with cystitis. Hysterectomy. Atherosclerotic vascular disease. MUSCULOSKELETAL: A new sacral decubitus ulcer with subcutaneous emphysema extending into the surrounding soft tissues with associated inflammatory changes. A new decubitus ulcer at the right greater trochanter with surrounding inflammatory changes. No loculated drainable fluid collection. No aggressive osseous erosion. Degenerative changes of the spine and hips.     IMPRESSION: 1.  New sacral decubitus ulcer with subcutaneous emphysema  extending into the surrounding soft tissues. This could represent necrotizing fasciitis. 2.  A new right greater trochanter decubitus ulcer. 3.  Cystitis. [Critical Result: Necrotizing fasciitis] Finding was identified on 2/16/2023 6:35 PM. Dr. Singer was contacted by me on 2/16/2023 6:42 PM and verbalized understanding of the critical result.

## 2023-02-18 NOTE — PROGRESS NOTES
"CLINICAL NUTRITION SERVICES - ASSESSMENT NOTE     Nutrition Prescription    RECOMMENDATIONS FOR MDs/PROVIDERS TO ORDER:  Recommend multi vitamin with minerals daily to aid in wound healing    Malnutrition Status:    Not noted    Recommendations already ordered by Registered Dietitian (RD):  Add glucerna twice per day, ankit twice per day and multivitamin with  Minerals daily (if OK with MD)    Future/Additional Recommendations:  Follow po intake, weight, labs, wound healing     REASON FOR ASSESSMENT  Cristal Preciado is a/an 72 year old female assessed by the dietitian for Admission Nutrition Risk Screen for positive-unsure about weight loss but noted to be eating fine PTA    Pt with history of sacral decubitus ulcer (present on admission), CVA, dementia, neuropathy, DM 2, CKD, CAD and FTT admitted on 2/16/2023 with infected new sacral decubitus ulcer with possible necrotizing fasciitis.  Also with heel ulcers    NUTRITION HISTORY  Pt's son was in room but sleeping-tried to have conversation with home about his mother's nutrition history but he just mumbled and went back to sleep.  He did states she took some protein shakes but didn't know what they were or how often she took them.    CURRENT NUTRITION ORDERS  Diet: Consistent CHO (60 g CHO with each meal)  Intake/Tolerance: Not documented yet    LABS  Labs reviewed: Alb 1.8 (2/17/23), urea nitrogen 17.5, Cr 0.86, Glu 216, 199, 230    MEDICATIONS  Medications reviewed: lipitor, cleocin, novolog, lantus pen, victoza injection, pantoprazole, zosyn, vancocin    ANTHROPOMETRICS  Height: 5'7\"  Most Recent Weight: 78.1 kg (172 lb 2.9 oz)    IBW: 61.4 kg (135 lb)  BMI: Overweight BMI 25-29.9  Weight History:   Wt Readings from Last 10 Encounters:   02/17/23 78.1 kg (172 lb 2.9 oz)   02/09/23 62.1 kg (137 lb)   01/13/23 65.9 kg (145 lb 3.2 oz)   12/20/22 69.1 kg (152 lb 4.8 oz)   12/08/22 71.7 kg (158 lb)   11/30/22 74.4 kg (164 lb)   09/28/22 71.7 kg (158 lb)   09/07/22 72.8 " kg (160 lb 6.4 oz)   07/21/22 69.9 kg (154 lb)   05/11/22 70.3 kg (155 lb)       Dosing Weight: 65.6 kg/ adjusted weight    ASSESSED NUTRITION NEEDS  Estimated Energy Needs: 0017-9555 kcals/day (25 - 30 kcals/kg)  Justification: Maintenance  Estimated Protein Needs: 79-99 grams protein/day (1.2 - 1.5 grams of pro/kg)  Justification: Wound healing  Estimated Fluid Needs: 1968 mL/day ({30 ml/Kg)   Justification: Maintenance    PHYSICAL FINDINGS  See malnutrition section below.  Pressure injury   Lalit score=12, nutrition noted as probably inadequate  GI: hypoactive BS  Last BM not documented    MALNUTRITION:  % Weight Loss:  None noted  % Intake:  Unable to identify  Subcutaneous Fat Loss:  None observed  Muscle Loss:  None observed  Fluid Retention:  None noted    Malnutrition Diagnosis: Patient does not meet two of the above criteria necessary for diagnosing malnutrition    NUTRITION DIAGNOSIS  Increased nutrient needs Protein related to multiple wounds as evidenced by need for wound healing      INTERVENTIONS  Implementation  Nutrition Education: Not appropriate at this time due to patient condition   Medical food supplement therapy -will add glucerna twice per day between meals, Terrence twice per day for wound healing and multi vitamin with minerals for wound healing    Goals  Total avg nutritional intake to meet a minimum of 1640 kcal/kg and 79 g   Protein/day  Wound healing  -180     Monitoring/Evaluation  Progress toward goals will be monitored and evaluated per protocol.

## 2023-02-18 NOTE — PROCEDURES
"PICC Line Insertion Procedure Note  Pt. Name: Cristal Preciado  MRN:        [id number]    Procedure: Insertion of a  triple Lumen  5 fr  Bard SOLO (valved) Power PICC, Lot number [id number]    Indications: Vascular access    Contraindications : none    Procedure Details     Patient identified with 2 identifiers and \"Time Out\" conducted.  .     Central line insertion bundle followed: hand hygiene performed prior to procedure, site cleansed with cholraprep, hat, mask, sterile gloves, sterile gown worn, patient draped with maximum barrier head to toe drape, sterile field maintained.    The vein was assessed and found to be compressible and of adequate size.     Lidocaine 1% 2 ml administered sq to the insertion site. A 5 Fr PICC was inserted into the basilic vein of the right arm with ultrasound guidance. 1 attempt(s) required to access vein.   Catheter threaded without difficulty. Good blood return noted.    Modified Seldinger Technique used for insertion.    The 8 sharps that are included in the PICC insertion kit were accounted for and disposed of in the sharps container prior to breakdown of the sterile field.    Catheter secured with Statlock, biopatch and Tegaderm dressing applied.    Findings:    Total catheter length  38 cm, with 0 cm exposed. Mid upper arm circumference is 33 cm. Catheter was flushed with 30 cc NS. Patient  tolerated procedure well.    Tip placement verified by 3CG technology. Tip placement in the SVC.    CLABSI prevention brochure left at bedside.    Patient's primary RN notified PICC is ready for use.      Comments:        Lea LYNCHN,RN,VA-BC  Vascular Access - [facility]        "

## 2023-02-18 NOTE — PROGRESS NOTES
Speech Language Pathology: Clinical Swallow Evaluation     02/18/23 1600   Appointment Info   Signing Clinician's Name / Credentials (SLP) Usha Duran MS CCC-SLP   General Information   Onset of Illness/Injury or Date of Surgery 02/16/23   Referring Physician Joceline   Patient/Family Therapy Goal Statement (SLP) not stated   Pertinent History of Current Problem Cristal Preciado is a 72 year old female with h/o sacral decubitus ulcer (present on admission), CVA, dementia, neuropathy, DM2, CKD, CAD and FTT admitted on 2/16/2023 with infected new sacral decubitus ulcer with possible necrotizing fasciitis. Severe sepsis. Hx of modified diet, son unsure what level she was eating. At past admit, in Jan 2023, she was started on Puree/Thin and son said later she was advanced and eating textures.  She had outpatient video swallow study done 10/7/22 due to failure to thrive and poor PO intake. There was laryngeal penetration with thin, and mildly thick liquids, no aspiration, pharyngeal stasis increasing with thicker consistencies. It is unclear what her current diet is at SNF. She was also referred to GI and has EGD scheduled for March 2023.   General Observations Would not allow head of bed up at all at 3:15, contacted RN and he administered pain med/came back after that kicked in. Still only allowed head of bed up to 45 degree angle, but was better than before.   Type of Evaluation   Type of Evaluation Swallow Evaluation   Oral Motor   Oral Musculature generally intact   Structural Abnormalities none present   Dentition (Oral Motor)   Dentition (Oral Motor) edentulous   Facial Symmetry (Oral Motor)   Facial Symmetry (Oral Motor) WNL   Lip Function (Oral Motor)   Lip Range of Motion (Oral Motor) WNL   Tongue Function (Oral Motor)   Tongue ROM (Oral Motor) unable/difficult to assess   Cough/Swallow/Gag Reflex (Oral Motor)   Volitional Swallow (Oral Motor) WNL   Vocal Quality/Secretion Management (Oral Motor)   Vocal  "Quality (Oral Motor) WNL   Secretion Management (Oral Motor) WNL   General Swallowing Observations   Past History of Dysphagia Previous clinical swallow evaluation in 01/2023, \"Currently appears to demonstrate primary oral dysphagia consistent with cognitive impairment. She demonstrates active mastication and manipulation of boluses but may hold bolus anteriorly and has difficulty transferring solids posteriorly to trigger swallow. After spitting out soft solid bolus she continually spit out micro pieces of texture which is again consistent with cognition. She demonstrated no significant difficulty with puree texture, no s/s aspiration with puree or thin liquids taken by straw in single sips or rapid consecutive swallows.\"  She had outpatient video swallow study done 10/7/22 due to failure to thrive and poor PO intake. There was laryngeal penetration with thin, and mildly thick liquids, no aspiration, pharyngeal stasis increasing with thicker consistencies.   Respiratory Support (General Swallowing Observations) none   Current Diet/Method of Nutritional Intake (General Swallowing Observations, NIS) thin liquids (level 0);regular diet  (but RN did not administer; previously was on a modified diet and wasn't sure what)   Swallowing Evaluation Clinical swallow evaluation   Clinical Swallow Evaluation   Feeding Assistance frequent cues/help required   Clinical Swallow Evaluation Textures Trialed thin liquids   Clinical Swallow Eval: Thin Liquid Texture Trial   Mode of Presentation, Thin Liquids straw;fed by clinician   Volume of Liquid or Food Presented 10 oz   Oral Phase of Swallow WFL   Pharyngeal Phase of Swallow intact   Diagnostic Statement patient took single sips and rapid consecutive swallows. She had good oral control no overt s/s aspiration throughout the session.   Esophageal Phase of Swallow   Patient reports or presents with symptoms of esophageal dysphagia No  (none known)   Swallowing Recommendations "   Diet Consistency Recommendations pureed (level 4);thin liquids (level 0)   Mode of Delivery Recommendations bolus size, small;slow rate of intake   Monitoring/Assistance Required (Eating/Swallowing) monitor for cough or change in vocal quality with intake   Recommended Feeding/Eating Techniques (Swallow Eval) maintain upright sitting position for eating;minimize distractions during oral intake;provide assist with feeding   Instrumental Assessment Recommendations instrumental evaluation not recommended at this time   Comment, Swallowing Recommendations Due to pain and limited head elevation (45 degrees max tolerated after pain med) and previously had diet modifications/dysphagia to some degree, a puree diet and thin liquids is recommended to maximize nutrition and hydration. There was laryngeal penetration but no aspiration on previous swallow study in Oct 2022. Monitor tolerance, respiratory status, and medical improvement.   General Therapy Interventions   Planned Therapy Interventions Dysphagia Treatment   Dysphagia treatment Instruction of safe swallow strategies;Modified diet education   Clinical Impression   Criteria for Skilled Therapeutic Interventions Met (SLP Eval) Yes, treatment indicated   SLP Diagnosis dysphagia   Risks & Benefits of therapy have been explained evaluation/treatment results reviewed;care plan/treatment goals reviewed;participants voiced agreement with care plan;participants included;son;patient   Clinical Impression Comments Clinical swallow evaluation completed. Pt only willing to sit up to 45 degree angle due to pain (even 1/2 hour after pain med given). Patient was able to take single or rapid consecutive swallows of thin liquids without s/s of aspiration. Pt refused pureed textures. With not sitting up and previous oral dysphagia noted, didn't feel appropriate to offer any more advanced textures at this time to trial. Recommend starting out with Level 4 Pureed textures and thin  liquids.   SLP Total Evaluation Time   Eval: oral/pharyngeal swallow function, clinical swallow Minutes (18390) 12   SLP Goals   Therapy Frequency (SLP Eval) 4 times/wk   SLP Predicted Duration/Target Date for Goal Attainment 02/24/23   SLP Goals Swallow   SLP: Safely tolerate diet without signs/symptoms of aspiration Minced & moist diet;Thin liquids;With assistance/supervision   SLP Discharge Planning   SLP Plan f/u diet tolerance and when sitting up more, can trial advancing textures - prior oral dysphagia r/t cognition and unsure what diet level was   SLP Discharge Recommendation Long term care facility   SLP Rationale for DC Rec swallow below baseline, modified textures   SLP Brief overview of current status  Pt even despite only sitting up to 45 degrees appeared to tolerate thin liquids, okay to initiate Level 4/pureed textures and thin liquids. Encourage sitting more upright and help manage pain to better tolerate. SLP to trial diet advancement when better able to sit up.   Total Session Time   Total Session Time (sum of timed and untimed services) 12

## 2023-02-18 NOTE — PROGRESS NOTES
[facility]    PROGRESS NOTE - Hospitalist Service    Assessment and Plan    Principal Problem:    Severe sepsis (H)  Active Problems:    CAD (coronary artery disease)    Hypertension    Hyperlipidemia    H/O: stroke    Hypomagnesemia    DM type 2 w Neuro/Retin/Nephr -- hgb A1C 7.6 on 1/3/23    Oropharyngeal dysphagia    Cognitive impairment Consistent with Mild Dementia    Wound infection    Sacral wound, initial encounter    Hypokalemia    Hypercalcemia    Cristal Preciado is a 72 year old female with h/o sacral decubitus ulcer (present on admission), CVA, dementia, neuropathy, DM2, CKD, CAD and FTT admitted on 2/16/2023 with infected new sacral decubitus ulcer with possible necrotizing fasciitis.      Sepsis secondary to Infected Sacral Decubitus ulcer/Possible Necrotizing fasciits,  - Lactic acid= 6.4, WBC= 14.1.  - 2/17 s/p  I&D by Dr. Ragsdale   - Continue IV Vancomycin, Zosyn and Clindamycin.   - IVF for now   - trend labs   - follow up intraoperative cultures   - blood cx pending   - wound care per surgery   - ID consulted    ABL anemia Hgb came back at 5.7  - transfused 2 units last night  - recheck pending   - transfuse to keep Hgb > 7     UTI  - Urinalysis reviewed and consistent with infection.   - on broad spectrum abx   - Ucx pending      DM  - lantus 20 units QAM  - novolog sliding scale medium TID with meals   - victoza     HTN  - metoprolol     Leukocytosis- 2/2 infected sacral decubitus ulcer and UTI.  -  Continue Vancomycin, Zosyn and Clindamycin.   - WBC normalized      Hyponatremia- Mild. Resolved  - gentle IVF  - trend labs      Hypercalcemia- resolved may have been due to dehydration    Hypokalemia  - replacement protocol     hypomag  - getting 2gm IV mag   And 2.0 today     H/o dysphagia and son is not certain of exact diet but he is aware shes on a modified diet, but uncertain of type  - SLP to seen and possible VSS pending their further recs     Heel ulcers will have WOC  "see on Monday when they are back on.     Dementia- Surrogate decision makers are daughter- Alexandra Preciado [phone] and son- Camron Preciado [phone].      Clinically Significant Risk Factors                        # Overweight: Estimated body mass index is 28.31 kg/m  as calculated from the following:    Height as of this encounter: 1.626 m (5' 4\").    Weight as of this encounter: 74.8 kg (164 lb 14.4 oz)., PRESENT ON ADMISSION         COVID-19 PCR Results    COVID-19 PCR Results 1/14/22 1/18/22 1/21/22 1/25/22 1/28/22 2/1/22 2/4/22 2/8/22 2/11/22 2/15/22   COVID-19 Virus by PCR (External Result)             SARS CoV2 PCR Negative  Negative  Negative  Negative Negative Negative Negative   COVID-19 Virus PCR - Result  NOT DETECTED  NOT DETECTED  NOT DETECTED          Comments are available for some flowsheets but are not being displayed.         COVID-19 Antibody Results, Testing for Immunity    COVID-19 Antibody Results, Testing for Immunity   No data to display.            Code Status: Full Code  VTE prophylaxis:  Pneumatic Compression Devices  DIET: Orders Placed This Encounter      Consistent Carbohydrate Diet Moderate Consistent Carb (60 g CHO per Meal) Diet    Drains/Lines: PICC  Weight bearing status: bedrest, per son he does not ambulate   Disposition/Barriers to discharge: pending postop course, and family requesting new TCU     Expected Discharge Date:     TBD      Interval History   {Pertinent overnight events  ;none    Subjective:  Son in the room and no concerns at this time and pain controlled     PHYSICAL EXAM  Temp:  [97.1  F (36.2  C)-99.9  F (37.7  C)] 97.7  F (36.5  C)  Pulse:  [] 88  Resp:  [12-20] 16  BP: ()/(44-61) 138/61  SpO2:  [95 %-100 %] 100 %  Wt Readings from Last 1 Encounters:   02/17/23 78.1 kg (172 lb 2.9 oz)       Intake/Output Summary (Last 24 hours) at 2/17/2023 0840  Last data filed at 2/17/2023 0646  Gross per 24 hour   Intake 3450 ml   Output 730 ml   Net 2720 " ml      Body mass index is 26.97 kg/m .    Physical Exam  Constitutional:       Comments: Chronically ill-appearing NAD   HENT:      Mouth/Throat:      Mouth: Mucous membranes are dry.   Cardiovascular:      Rate and Rhythm: Normal rate and regular rhythm.      Pulses: Normal pulses.   Pulmonary:      Effort: Pulmonary effort is normal.      Breath sounds: Normal breath sounds.   Abdominal:      General: Bowel sounds are normal. There is no distension.      Palpations: Abdomen is soft.      Tenderness: There is no abdominal tenderness.   Musculoskeletal:      Comments: Ulcers on both heels    Skin:     Comments: wound covered   Neurological:      Mental Status: She is alert. Mental status is at baseline. She is disoriented.       PERTINENT LABS/IMAGING:  Results for orders placed or performed during the hospital encounter of 02/16/23   CT Pelvis Soft Tissue w Contrast   Result Value Ref Range    Radiologist flags Necrotizing fasciitis (AA)     Impression    IMPRESSION:  1.  New sacral decubitus ulcer with subcutaneous emphysema extending into the surrounding soft tissues. This could represent necrotizing fasciitis.  2.  A new right greater trochanter decubitus ulcer.  3.  Cystitis.      [Critical Result: Necrotizing fasciitis]    Finding was identified on 2/16/2023 6:35 PM.     Dr. Singer was contacted by me on 2/16/2023 6:42 PM and verbalized understanding of the critical result.          Recent Labs   Lab 02/18/23  0647 02/18/23  0247 02/18/23  0231 02/17/23  2356 02/17/23  2321 02/17/23  1217 02/17/23  1035 02/17/23  0752 02/17/23  0630 02/16/23  1939 02/16/23  1431   WBC  --  10.0  --   --   --   --   --   --  9.4  --  14.1*   HGB  --  5.7*  --   --   --   --   --   --  8.0*  --  10.5*   MCV  --  95  --   --   --   --   --   --  93  --  94   PLT  --  224  --   --   --   --   --   --  274  --  369   NA  --  139  --   --   --   --  136  --   --   --  132*   POTASSIUM  --  5.1  5.1  --   --  3.4  --  2.8*  --    --   --  4.2   CHLORIDE  --  109*  --   --   --   --  103  --   --   --  93*   CO2  --  18*  --   --   --   --  23  --   --   --  20*   BUN  --  17.5  --   --   --   --  18.1  --   --   --  34.6*   CR  --  0.86  --   --   --   --  0.75  --   --   --  0.93   ANIONGAP  --  12  --   --   --   --  10  --   --   --  19*   OLAF  --  7.7*  --   --   --   --  8.5*  --   --   --  10.3*   * 215* 215*   < >  --    < > 142*   < >  --    < > 440*   ALBUMIN  --   --   --   --   --   --  1.8*  --   --   --  2.9*   PROTTOTAL  --   --   --   --   --   --  5.7*  --   --   --  8.0   BILITOTAL  --   --   --   --   --   --  0.6  --   --   --  0.8   ALKPHOS  --   --   --   --   --   --  184*  --   --   --  318*   ALT  --   --   --   --   --   --  22  --   --   --  36*   AST  --   --   --   --   --   --  35  --   --   --  54*    < > = values in this interval not displayed.     Recent Labs   Lab Test 11/18/21  0544   CHOL 92   HDL 26*   LDL 30   TRIG 180*     Recent Labs   Lab Test 02/17/23  0752 02/16/23  1939 02/16/23  1431   NA  --   --  132*   POTASSIUM  --   --  4.2   CHLORIDE  --   --  93*   CO2  --   --  20*   *   < > 440*   BUN  --   --  34.6*   CR  --   --  0.93   GFRESTIMATED  --   --  66   OLAF  --   --  10.3*    < > = values in this interval not displayed.     Recent Labs   Lab Test 01/03/23  2250 08/17/22  0532 06/17/21  0553   A1C 7.6* 6.9* 8.1*     Recent Labs   Lab Test 02/17/23  0630 02/16/23  1431 02/03/23  0602   HGB 8.0* 10.5* 11.6*     Recent Labs   Lab Test 04/24/18  1021 04/24/18  0348 04/24/18  0006   TROPONINI <0.01 <0.01 <0.01     Recent Labs   Lab Test 07/23/19  1158   NTBNP 54     Recent Labs   Lab Test 02/03/23  0602   TSH 0.82     No results for input(s): INR in the last 17183 hours.    25 MINUTES SPENT BY ME on the date of service doing chart review, history, exam, documentation & further activities per the note.  Niki Car MD  [facility] Medicine Service  [phone]

## 2023-02-18 NOTE — ANESTHESIA POSTPROCEDURE EVALUATION
Patient: Cristal Preciado    Procedure: Procedure(s):  DEBRIDEMENT OF SACRAL ULCER,  DEBRIDEMENT OF RIGHT HIP ULCER       Anesthesia Type:  General    Note:  Disposition: Inpatient   Postop Pain Control: Uneventful            Sign Out: Well controlled pain   PONV: No   Neuro/Psych: Uneventful            Sign Out: Acceptable/Baseline neuro status   Airway/Respiratory: Uneventful            Sign Out: Acceptable/Baseline resp. status   CV/Hemodynamics: Uneventful            Sign Out: Acceptable CV status; No obvious hypovolemia; No obvious fluid overload   Other NRE:    DID A NON-ROUTINE EVENT OCCUR?     Event details/Postop Comments:  Awake. VSS. Checked dressing prior to transport (had to be redone because the first one soaked through). Perhaps a total of 200cc of blood loss at this point, biy difficult to be sure. Ordered type and screen, Hgb, and K (was on K replacement in the past). Phlebotomist had difficulty. Threfore we will start the process of a PICC line so they can draw blood and give fluids more easily. RN aware of plan.           Last vitals:  Vitals Value Taken Time   /53 02/17/23 2200   Temp 36.8  C (98.3  F) 02/17/23 2118   Pulse 96 02/17/23 2203   Resp 9 02/17/23 2203   SpO2 100 % 02/17/23 2203   Vitals shown include unvalidated device data.    Electronically Signed By: Brain Mahoney MD  February 17, 2023  10:04 PM

## 2023-02-18 NOTE — ANESTHESIA CARE TRANSFER NOTE
Patient: Cristal Preciado    Procedure: Procedure(s):  DEBRIDEMENT OF SACRAL ULCER,  DEBRIDEMENT OF RIGHT HIP ULCER       Diagnosis: Sacral wound, initial encounter [S31.000A]  Diagnosis Additional Information: No value filed.    Anesthesia Type:   General     Note:    Oropharynx: oropharynx clear of all foreign objects  Level of Consciousness: drowsy  Oxygen Supplementation: face mask  Level of Supplemental Oxygen (L/min / FiO2): 8  Independent Airway: airway patency satisfactory and stable  Dentition: dentition unchanged  Vital Signs Stable: post-procedure vital signs reviewed and stable  Report to RN Given: handoff report given  Patient transferred to: PACU    Handoff Report: Identifed the Patient, Identified the Reponsible Provider, Reviewed the pertinent medical history, Discussed the surgical course, Reviewed Intra-OP anesthesia mangement and issues during anesthesia, Set expectations for post-procedure period and Allowed opportunity for questions and acknowledgement of understanding      Vitals:  Vitals Value Taken Time   BP 99/44 02/17/23 2118   Temp     Pulse 90 02/17/23 2119   Resp 14 02/17/23 2119   SpO2 100 % 02/17/23 2117   Vitals shown include unvalidated device data.    Electronically Signed By: Haley Nugent CRNA, APRN CRNA  February 17, 2023  9:21 PM

## 2023-02-18 NOTE — PLAN OF CARE
Problem: Plan of Care - These are the overarching goals to be used throughout the patient stay.    Goal: Plan of Care Review  Description: The Plan of Care Review/Shift note should be completed every shift.  The Outcome Evaluation is a brief statement about your assessment that the patient is improving, declining, or no change.  This information will be displayed automatically on your shift note.  2/18/2023 0652 by Maegan Keane RN  Outcome: Progressing  2/18/2023 0651 by Maegan Keane RN  Outcome: Progressing     Problem: Risk for Delirium  Goal: Improved Sleep  Outcome: Progressing     Problem: Pain Acute  Goal: Optimal Pain Control and Function  Outcome: Progressing  Intervention: Develop Pain Management Plan  Recent Flowsheet Documentation  Taken 2/18/2023 0038 by Maegan Keane RN  Pain Management Interventions: medication (see MAR)  Intervention: Prevent or Manage Pain  Recent Flowsheet Documentation  Taken 2/18/2023 0030 by Maegan Keane RN  Medication Review/Management: medications reviewed     Problem: Electrolyte Imbalance  Goal: Electrolyte Imbalance: Plan of Care  Outcome: Progressing   Goal Outcome Evaluation:       IV Dilaudid 0.2mg given for pain. PICC Rt arm was placed. Blood drawn from PICC. K was 3.4, replaced. Hgb 5.7. 2 units of Blood transfusion started, ongoing. Pt on room air, VSS. Will continue to monitor.

## 2023-02-18 NOTE — PLAN OF CARE
Problem: Plan of Care - These are the overarching goals to be used throughout the patient stay.    Goal: Plan of Care Review  Description: The Plan of Care Review/Shift note should be completed every shift.  The Outcome Evaluation is a brief statement about your assessment that the patient is improving, declining, or no change.  This information will be displayed automatically on your shift note.  Outcome: Progressing   Goal Outcome Evaluation:         Pt received 2 units PRBC. Pt getting iv dilaudid for pain. Pt getting iv abx. Pt has PICC line

## 2023-02-18 NOTE — OP NOTE
Operative Note    Name:  Cristal Preciado  PCP:  Aditi Allen  Procedure Date:  2/17/2023       Procedure:  Procedure(s):  DEBRIDEMENT OF SACRAL ULCER,  DEBRIDEMENT OF RIGHT HIP ULCER     Pre-Procedure Diagnosis:  Sacral wound, initial encounter [S31.000A]     Post-Procedure Diagnosis:    Same     Surgeon(s):  Shawna Ragsdale MD     Assistant: None        Anesthesia Type:  General       Findings:  Stage IV ulcers over the sacrum and right greater trochanter.  Both go down to bone.    Operative Report:    The patient was brought to the operating suite where general anesthesia was administered.  She was then placed into the left lateral decubitus position.  The entire lower back and right hip were prepped and draped in a sterile fashion.  Using sharp dissection with a 15 blade knife I extended what looks like just a small wound over the sacrum toward the right as there was clearly tunneling and encountered a tremendous amount of necrotic tissue.  There was gross necrosis of the subcutaneous tissues and then fascia extending all the way down to the sacrum.  Again using sharp dissection I removed much of the skin over this as there was necrosis extending in the subcutaneous tissues to just underneath the skin level.  Once this was all debrided there was a wound that extended 12 cm in width and 10 cm in the superior inferior direction.  Again this debridement was an excisional debridement using a 15 blade knife.  I then turned my attention to the right greater trochanter where there was a large wound already but an area of the devitalized skin measuring 10 cm in diameter.  This was all excised with sharp dissection with a 15 blade knife.  There is also tunneling that extended in a radial direction and extended the incision up several centimeters to expose and debride the necrotic subcutaneous tissues in this area.  In this wound I also encountered a clear-cut abscess which was cultured.  I continued this debridement  down through the fascia and to bone which was exposed.  I debrided it at all until I was to bleeding tissue.  At this point the patient had a fair amount of oozing throughout both wounds which I controlled first with electrocautery as best as I could then a treatment of Surgicel powder.  Eventually in the recovery room we also treated this with Surgiflo as she was just generally oozy throughout both wounds.  Sterile dressings were placed.  The patient tolerated the procedure well.  I had a beka discussion with her son and daughter about what I feel is a long-term poor prognosis given the extent of the the wounds in a patient who is immobile.    Estimated Blood Loss:   200cc        Specimens:    ID Type Source Tests Collected by Time Destination   1 : decubitus ulcers - sacrum and right hip Tissue Spine, Sacrum SURGICAL PATHOLOGY EXAM Shawna Ragsdale MD 2/17/2023  8:53 PM    A : right hip swab Tissue Hip, Right ANAEROBIC BACTERIAL CULTURE ROUTINE, GRAM STAIN, AEROBIC BACTERIAL CULTURE ROUTINE Shawna Ragsdale MD 2/17/2023  8:45 PM            Drains:   External Urinary Catheter (Active)   Skin no redness;no breakdown 02/17/23 0646   Urine Color Yellow 02/17/23 0646   Urine Appearance Clear 02/17/23 0646   Urine Odor Other (Comment 02/17/23 0646   Output (mL) 200 mL 02/17/23 0646   Collection Container Other (Comment) 02/17/23 0646   Securement Method Securing device (Describe) 02/17/23 0209   External Catheter changed Done 02/17/23 1345       Complications:    None    Shawna Ragsdale MD     Date: 2/17/2023  Time: 9:23 PM

## 2023-02-19 NOTE — PROGRESS NOTES
ASSESSMENT:  1. Sacral wound, initial encounter    2. Wound infection    3. Severe sepsis (H)        Cristal Preciado is a 72 year old female who is s/p I&D of stage IV ulcers of the sacrum and right greater trochanter on 2/17 with Dr. Ragsdale.  Wounds overall clean and dry with some superficial spotty necrosis and some of the black pieces you will see are not necrosis but Surgiflo product that helps with clotting.  Wounds with very poor tissue.  Patient prefers to lay on her right side which is comfortable and states it does not hurt.  My concern is that this is where one of her ulcers is and this is not going to help her heal.    PLAN:  -We ordered Vashe for the supply and recommend changing her wound 3 times a day.  Keep skin dry.  Back wounds with either fluffs or Kerlix soaked in Vashe and cover with ABDs.  -Plan to consult WOC RN on Monday and hopefully we can assess for wound care VAC placement sometime next week.  -Make sure no pressure on the right hip wound as patient prefers to lay this way and unfortunately this is where one of her ulcers are and this will in have bit her from healing as well as overall just poor health status.    SUBJECTIVE:   She is comfortable and her only complaint is pain of her face.  She states that right now her back and her buttocks feel okay.  She has been afebrile.  No significant events overnight.      Patient Vitals for the past 24 hrs:   BP Temp Temp src Pulse Resp SpO2   02/19/23 0826 114/60 97.8  F (36.6  C) Oral 73 20 100 %   02/19/23 0532 137/63 97.8  F (36.6  C) Oral 72 20 100 %   02/19/23 0005 119/53 97.9  F (36.6  C) Oral 71 18 100 %   02/18/23 2025 108/51 98.2  F (36.8  C) Oral 83 18 100 %   02/18/23 1530 98/47 98.6  F (37  C) Oral 73 20 97 %         PHYSICAL EXAM:  GEN: No acute distress, comfortable    Both wounds are clean and dry with some spotty superficial necrosis  Output by Drain (mL) 02/17/23 0700 - 02/17/23 1459 02/17/23 1500 - 02/17/23 2259 02/17/23 2300 -  02/18/23 0659 02/18/23 0700 - 02/18/23 1459 02/18/23 1500 - 02/18/23 2259 02/18/23 2300 - 02/19/23 0659 02/19/23 0700 - 02/19/23 1115   Requested LDAs do not have output data documented.      EXT:No cyanosis, edema or obvious abnormalities    02/18 0700 - 02/19 0659  In: 2026 [P.O.:1380]  Out: 1500 [Urine:1500]    Admission on 02/16/2023   Component Date Value     GLUCOSE BY METER POCT 02/16/2023 358 (H)      Sodium 02/16/2023 132 (L)      Potassium 02/16/2023 4.2      Chloride 02/16/2023 93 (L)      Carbon Dioxide (CO2) 02/16/2023 20 (L)      Anion Gap 02/16/2023 19 (H)      Urea Nitrogen 02/16/2023 34.6 (H)      Creatinine 02/16/2023 0.93      Calcium 02/16/2023 10.3 (H)      Glucose 02/16/2023 440 (H)      Alkaline Phosphatase 02/16/2023 318 (H)      AST 02/16/2023 54 (H)      ALT 02/16/2023 36 (H)      Protein Total 02/16/2023 8.0      Albumin 02/16/2023 2.9 (L)      Bilirubin Total 02/16/2023 0.8      GFR Estimate 02/16/2023 66      Lactic Acid 02/16/2023 6.4 (HH)      CRP Inflammation 02/16/2023 337.70 (H)      Erythrocyte Sedimentatio* 02/16/2023 118 (H)      Color Urine 02/16/2023 Yellow      Appearance Urine 02/16/2023 Cloudy (A)      Glucose Urine 02/16/2023 500 (A)      Bilirubin Urine 02/16/2023 Negative      Ketones Urine 02/16/2023 Negative      Specific Gravity Urine 02/16/2023 1.029      Blood Urine 02/16/2023 0.1 mg/dL (A)      pH Urine 02/16/2023 5.0      Protein Albumin Urine 02/16/2023 20 (A)      Urobilinogen Urine 02/16/2023 <2.0      Nitrite Urine 02/16/2023 Negative      Leukocyte Esterase Urine 02/16/2023 500 Summer/uL (A)      Bacteria Urine 02/16/2023 Many (A)      WBC Clumps Urine 02/16/2023 Present (A)      RBC Urine 02/16/2023 29 (H)      WBC Urine 02/16/2023 31 (H)      Culture 02/16/2023 No growth after 2 days      Culture 02/16/2023 No growth after 2 days      Culture 02/16/2023 Culture in progress      Culture 02/16/2023 1+ Escherichia coli (A)      Culture 02/16/2023 1+ Proteus  mirabilis (A)      Culture 02/16/2023 2+ Enterococcus faecalis (A)      Culture 02/16/2023 4+ Corynebacterium striatum (A)      Culture 02/16/2023 Culture in progress      Culture 02/16/2023 2+ Proteus mirabilis (A)      Culture 02/16/2023 1+ Enterococcus faecalis (A)      WBC Count 02/16/2023 14.1 (H)      RBC Count 02/16/2023 3.50 (L)      Hemoglobin 02/16/2023 10.5 (L)      Hematocrit 02/16/2023 32.9 (L)      MCV 02/16/2023 94      MCH 02/16/2023 30.0      MCHC 02/16/2023 31.9      RDW 02/16/2023 13.5      Platelet Count 02/16/2023 369      % Neutrophils 02/16/2023 81      % Lymphocytes 02/16/2023 8      % Monocytes 02/16/2023 10      % Eosinophils 02/16/2023 0      % Basophils 02/16/2023 0      % Immature Granulocytes 02/16/2023 1      NRBCs per 100 WBC 02/16/2023 0      Absolute Neutrophils 02/16/2023 11.6 (H)      Absolute Lymphocytes 02/16/2023 1.1      Absolute Monocytes 02/16/2023 1.3      Absolute Eosinophils 02/16/2023 0.0      Absolute Basophils 02/16/2023 0.0      Absolute Immature Granul* 02/16/2023 0.1      Absolute NRBCs 02/16/2023 0.0      Lactic Acid 02/16/2023 3.6 (H)      Radiologist flags 02/16/2023 Necrotizing fasciitis (AA)      Culture 02/16/2023 10,000-50,000 CFU/mL Mixture of urogenital brittani      GLUCOSE BY METER POCT 02/16/2023 343 (H)      GLUCOSE BY METER POCT 02/16/2023 324 (H)      Culture 02/16/2023 50,000-100,000 CFU/mL Mixture of urogenital brittani      Sodium 02/17/2023 136      Potassium 02/17/2023 2.8 (L)      Chloride 02/17/2023 103      Carbon Dioxide (CO2) 02/17/2023 23      Anion Gap 02/17/2023 10      Urea Nitrogen 02/17/2023 18.1      Creatinine 02/17/2023 0.75      Calcium 02/17/2023 8.5 (L)      Glucose 02/17/2023 142 (H)      Alkaline Phosphatase 02/17/2023 184 (H)      AST 02/17/2023 35      ALT 02/17/2023 22      Protein Total 02/17/2023 5.7 (L)      Albumin 02/17/2023 1.8 (L)      Bilirubin Total 02/17/2023 0.6      GFR Estimate 02/17/2023 85      GLUCOSE BY METER POCT  02/17/2023 166 (H)      Calcium, Ionized pH 7.4 02/17/2023 1.20      pH 02/17/2023 7.42      Calcium, Ionized Measured 02/17/2023 1.19      Hold Specimen 02/17/2023 JI      GLUCOSE BY METER POCT 02/17/2023 117 (H)      WBC Count 02/17/2023 9.4      RBC Count 02/17/2023 2.65 (L)      Hemoglobin 02/17/2023 8.0 (L)      Hematocrit 02/17/2023 24.5 (L)      MCV 02/17/2023 93      MCH 02/17/2023 30.2      MCHC 02/17/2023 32.7      RDW 02/17/2023 13.3      Platelet Count 02/17/2023 274      % Neutrophils 02/17/2023 77      % Lymphocytes 02/17/2023 9      % Monocytes 02/17/2023 12      % Eosinophils 02/17/2023 1      % Basophils 02/17/2023 0      % Immature Granulocytes 02/17/2023 1      NRBCs per 100 WBC 02/17/2023 0      Absolute Neutrophils 02/17/2023 7.3      Absolute Lymphocytes 02/17/2023 0.8      Absolute Monocytes 02/17/2023 1.2      Absolute Eosinophils 02/17/2023 0.1      Absolute Basophils 02/17/2023 0.0      Absolute Immature Granul* 02/17/2023 0.1      Absolute NRBCs 02/17/2023 0.0      GLUCOSE BY METER POCT 02/16/2023 279 (H)      GLUCOSE BY METER POCT 02/17/2023 133 (H)      Magnesium 02/17/2023 1.6 (L)      GLUCOSE BY METER POCT 02/17/2023 151 (H)      Potassium 02/17/2023 3.4      GLUCOSE BY METER POCT 02/17/2023 102 (H)      Culture 02/17/2023 No anaerobic organisms isolated after 1 day      Gram Stain Result 02/17/2023 3+ Gram positive cocci (A)      Gram Stain Result 02/17/2023 2+ Gram negative bacilli (A)      Gram Stain Result 02/17/2023 4+ WBC seen (A)      Culture 02/17/2023 2+ Escherichia coli (A)      Culture 02/17/2023 2+ Proteus mirabilis (A)      Culture 02/17/2023 1+ Enterococcus faecalis (A)      Sodium 02/18/2023 139      Potassium 02/18/2023 5.1      Chloride 02/18/2023 109 (H)      Carbon Dioxide (CO2) 02/18/2023 18 (L)      Anion Gap 02/18/2023 12      Urea Nitrogen 02/18/2023 17.5      Creatinine 02/18/2023 0.86      Calcium 02/18/2023 7.7 (L)      Glucose 02/18/2023 215 (H)      GFR  Estimate 02/18/2023 72      WBC Count 02/18/2023 10.0      RBC Count 02/18/2023 1.87 (L)      Hemoglobin 02/18/2023 5.7 (LL)      Hematocrit 02/18/2023 17.8 (L)      MCV 02/18/2023 95      MCH 02/18/2023 30.5      MCHC 02/18/2023 32.0      RDW 02/18/2023 13.8      Platelet Count 02/18/2023 224      Magnesium 02/18/2023 2.0      GLUCOSE BY METER POCT 02/17/2023 191 (H)      Potassium 02/18/2023 5.1      GLUCOSE BY METER POCT 02/18/2023 215 (H)      ABO/RH(D) 02/18/2023 B POS      Antibody Screen 02/18/2023 Negative      SPECIMEN EXPIRATION DATE 02/18/2023 20230221235900      Blood Component Type 02/18/2023 Red Blood Cells      Product Code 02/18/2023 [id number]      Unit Status 02/18/2023 Transfused      Unit Number 02/18/2023 [id number]      CROSSMATCH 02/18/2023 Compatible      CODING SYSTEM 02/18/2023 [id number]      ISSUE DATE AND TIME 02/18/2023 20230218075800      UNIT ABO/RH 02/18/2023 B+      UNIT TYPE ISBT 02/18/2023 7300      Blood Component Type 02/18/2023 Red Blood Cells      Product Code 02/18/2023 [id number]      Unit Status 02/18/2023 Transfused      Unit Number 02/18/2023 [id number]      CROSSMATCH 02/18/2023 Compatible      CODING SYSTEM 02/18/2023 [id number]      ISSUE DATE AND TIME 02/18/2023 [id number]      UNIT ABO/RH 02/18/2023 B+      UNIT TYPE ISBT 02/18/2023 7300      GLUCOSE BY METER POCT 02/18/2023 216 (H)      GLUCOSE BY METER POCT 02/18/2023 199 (H)      Hemoglobin 02/18/2023 8.8 (L)      GLUCOSE BY METER POCT 02/18/2023 230 (H)      GLUCOSE BY METER POCT 02/18/2023 280 (H)      GLUCOSE BY METER POCT 02/18/2023 351 (H)      GLUCOSE BY METER POCT 02/19/2023 343 (H)      GLUCOSE BY METER POCT 02/19/2023 369 (H)      GLUCOSE BY METER POCT 02/19/2023 318 (H)           JODY Zavala  [facility]  [address] [place] [address]  Phone- [phone]  Fax- [phone]

## 2023-02-19 NOTE — PHARMACY-VANCOMYCIN DOSING SERVICE
"Pharmacy Vancomycin Note  Date of Service 2023  Patient's  1950   72 year old, female    Indication: Osteomyelitis and Skin and Soft Tissue Infection  Day of Therapy: 4  Current vancomycin regimen:  1250 mg IV q24h  Current vancomycin monitoring method: AUC  Current vancomycin therapeutic monitoring goal: 400-600 mg*h/L    InsightRX Prediction of Current Vancomycin Regimen  Loading dose: N/A  Regimen: 1250 mg IV every 24 hours.  Start time: 16:01 on 2023  Exposure target: AUC24 (range)400-600 mg/L.hr   AUC24,ss: 455 mg/L.hr  Probability of AUC24 > 400: 78 %  Ctrough,ss: 13 mg/L  Probability of Ctrough,ss > 20: 3 %  Probability of nephrotoxicity (Lodise ZIGGY ): 8 %      Current estimated CrCl = Estimated Creatinine Clearance: 76.2 mL/min (based on SCr of 0.74 mg/dL).    Creatinine for last 3 days  2023:  2:31 PM Creatinine 0.93 mg/dL  2023: 10:35 AM Creatinine 0.75 mg/dL  2023:  2:47 AM Creatinine 0.86 mg/dL  2023: 11:06 AM Creatinine 0.74 mg/dL    Recent Vancomycin Levels (past 3 days)  2023: 11:06 AM Vancomycin 17.0 ug/mL    Vancomycin IV Administrations (past 72 hours)                   vancomycin (VANCOCIN) 1,250 mg in sodium chloride 0.9 % 250 mL intermittent infusion (mg) 1,250 mg New Bag 23 1601     1,250 mg Started 23 1552    vancomycin (VANCOCIN) 1,250 mg in sodium chloride 0.9 % 250 mL intermittent infusion (mg) 1,250 mg New Bag 23 1639                Nephrotoxins and other renal medications (From now, onward)    Start     Dose/Rate Route Frequency Ordered Stop    23 1600  vancomycin (VANCOCIN) 1,250 mg in sodium chloride 0.9 % 250 mL intermittent infusion         1,250 mg  over 90 Minutes Intravenous EVERY 24 HOURS 23 2101      23 2130  piperacillin-tazobactam (ZOSYN) 3.375 g vial to attach to  mL bag        Note to Pharmacy: For [facility], [facility] and [facility]: For Zosyn-naive patients, use the \"Zosyn initial dose + extended " "infusion\" order panel.    3.375 g  over 240 Minutes Intravenous EVERY 8 HOURS 02/16/23 2056               Contrast Orders - past 72 hours (72h ago, onward)    Start     Dose/Rate Route Frequency Stop    02/16/23 1900  iopamidol (ISOVUE-370) solution 100 mL         100 mL Intravenous ONCE 02/16/23 1832          Interpretation of levels and current regimen:  Vancomycin level is reflective of -600    Has serum creatinine changed greater than 50% in last 72 hours: No    Urine output:  unable to determine    Renal Function: Stable    Plan:  1. Continue Current Dose  2. Vancomycin monitoring method: AUC  3. Vancomycin therapeutic monitoring goal: 400-600 mg*h/L  4. Pharmacy will check vancomycin levels as appropriate in 1-3 Days.  5. Serum creatinine levels will be ordered daily for the first week of therapy and at least twice weekly for subsequent weeks.    Sejal Encarnacion, [facility]    "

## 2023-02-19 NOTE — PROGRESS NOTES
[facility]    PROGRESS NOTE - Hospitalist Service    Assessment and Plan    Principal Problem:    Severe sepsis (H)  Active Problems:    CAD (coronary artery disease)    Hypertension    Hyperlipidemia    H/O: stroke    Hypomagnesemia    DM type 2 w Neuro/Retin/Nephr -- hgb A1C 7.6 on 1/3/23    Oropharyngeal dysphagia    Cognitive impairment Consistent with Mild Dementia    Wound infection    Sacral wound, initial encounter    Hypokalemia    Hypercalcemia    Anemia due to blood loss, acute    Cristal Preciado is a 72 year old female with h/o sacral decubitus ulcer (present on admission), CVA, dementia, neuropathy, DM2, CKD, CAD and FTT admitted on 2/16/2023 with infected new sacral decubitus ulcer with possible necrotizing fasciitis.      Sepsis secondary to Infected Sacral Decubitus ulcer/Possible Necrotizing fasciits,  - Lactic acid= 6.4, WBC= 14.1.  - 2/17 s/p  I&D by Dr. Ragsdale   - Continue IV Vancomycin, Zosyn. ID stopped clindamycin  - trend labs   - follow up intraoperative cultures   - blood cx pending NGTD  - wound care per surgery   - ID consulted  - PICC line in place     ABL anemia Hgb came back at 5.7  - transfused 2 units last night  - recheck 8.8  - transfuse to keep Hgb > 7     DM  - lantus 20 units QAM  - novolog sliding scale medium TID with meals   - victoza     HTN  - metoprolol     UTI  - Urinalysis reviewed and consistent with infection.   - on broad spectrum abx   - Ucx pending      Leukocytosis- 2/2 infected sacral decubitus ulcer and UTI.  -  Continue Vancomycin, Zosyn   - WBC normalized      Hyponatremia- Mild. Resolved  - stop IVF  - trend labs      Hypercalcemia- resolved may have been due to dehydration    Hypokalemia  - replacement protocol     hypomag  - getting 2gm IV mag  Recheck stable     H/o dysphagia and son is not certain of exact diet but he is aware shes on a modified diet, but uncertain of type  - SLP to seen and possible VSS pending their further recs  "    Heel ulcers will have WOC see on Monday when they are back on.     Dementia- Surrogate decision makers are daughter- Alexandra Preciado [phone] and son- Camron Preciado [phone].      Clinically Significant Risk Factors                        # Overweight: Estimated body mass index is 28.31 kg/m  as calculated from the following:    Height as of this encounter: 1.626 m (5' 4\").    Weight as of this encounter: 74.8 kg (164 lb 14.4 oz)., PRESENT ON ADMISSION         COVID-19 PCR Results    COVID-19 PCR Results 1/14/22 1/18/22 1/21/22 1/25/22 1/28/22 2/1/22 2/4/22 2/8/22 2/11/22 2/15/22   COVID-19 Virus by PCR (External Result)             SARS CoV2 PCR Negative  Negative  Negative  Negative Negative Negative Negative   COVID-19 Virus PCR - Result  NOT DETECTED  NOT DETECTED  NOT DETECTED          Comments are available for some flowsheets but are not being displayed.         COVID-19 Antibody Results, Testing for Immunity    COVID-19 Antibody Results, Testing for Immunity   No data to display.            Code Status: Full Code  VTE prophylaxis:  Pneumatic Compression Devices  DIET: Orders Placed This Encounter      Combination Diet Moderate Consistent Carb (60 g CHO per Meal) Diet; Pureed Diet (level 4); Thin Liquids (level 0)    Drains/Lines: PICC  Weight bearing status: bedrest, per son he does not ambulate   Disposition/Barriers to discharge: pending postop course, and family requesting new LTC, may need to return to same facility while they look for a new placement     Expected Discharge Date:     TBD      Interval History   {Pertinent overnight events  ;none    Subjective:  Awake comfortable, denies any pain    PHYSICAL EXAM  Temp:  [97.8  F (36.6  C)-98.6  F (37  C)] 97.8  F (36.6  C)  Pulse:  [71-83] 73  Resp:  [18-20] 20  BP: ()/(47-63) 114/60  SpO2:  [97 %-100 %] 100 %  Wt Readings from Last 1 Encounters:   02/17/23 78.1 kg (172 lb 2.9 oz)       Intake/Output Summary (Last 24 hours) at 2/17/2023 " 0840  Last data filed at 2/17/2023 0646  Gross per 24 hour   Intake 3450 ml   Output 730 ml   Net 2720 ml      Body mass index is 26.97 kg/m .    Physical Exam  Constitutional:       Comments: Chronically ill-appearing NAD   HENT:      Head: Normocephalic.      Mouth/Throat:      Mouth: Mucous membranes are dry.   Cardiovascular:      Rate and Rhythm: Normal rate and regular rhythm.      Pulses: Normal pulses.      Comments: Soft systolic murmur  Pulmonary:      Effort: Pulmonary effort is normal.      Breath sounds: Normal breath sounds.   Abdominal:      General: Bowel sounds are normal. There is no distension.      Palpations: Abdomen is soft.      Tenderness: There is no abdominal tenderness.   Musculoskeletal:      Comments: Ulcers on both heels dry, ankle edema   Skin:     Comments: wound covered   Neurological:      Mental Status: She is alert. Mental status is at baseline. She is disoriented.       PERTINENT LABS/IMAGING:  Results for orders placed or performed during the hospital encounter of 02/16/23   CT Pelvis Soft Tissue w Contrast   Result Value Ref Range    Radiologist flags Necrotizing fasciitis (AA)     Impression    IMPRESSION:  1.  New sacral decubitus ulcer with subcutaneous emphysema extending into the surrounding soft tissues. This could represent necrotizing fasciitis.  2.  A new right greater trochanter decubitus ulcer.  3.  Cystitis.      [Critical Result: Necrotizing fasciitis]    Finding was identified on 2/16/2023 6:35 PM.     Dr. Singer was contacted by me on 2/16/2023 6:42 PM and verbalized understanding of the critical result.          Recent Labs   Lab 02/19/23  0831 02/19/23  0646 02/19/23  0210 02/18/23  1715 02/18/23  1221 02/18/23  0647 02/18/23  0247 02/17/23  2356 02/17/23  2321 02/17/23  1217 02/17/23  1035 02/17/23  0752 02/17/23  0630 02/16/23  1939 02/16/23  1431   WBC  --   --   --   --   --   --  10.0  --   --   --   --   --  9.4  --  14.1*   HGB  --   --   --   --  8.8*   --  5.7*  --   --   --   --   --  8.0*  --  10.5*   MCV  --   --   --   --   --   --  95  --   --   --   --   --  93  --  94   PLT  --   --   --   --   --   --  224  --   --   --   --   --  274  --  369   NA  --   --   --   --   --   --  139  --   --   --  136  --   --   --  132*   POTASSIUM  --   --   --   --   --   --  5.1  5.1  --  3.4  --  2.8*  --   --   --  4.2   CHLORIDE  --   --   --   --   --   --  109*  --   --   --  103  --   --   --  93*   CO2  --   --   --   --   --   --  18*  --   --   --  23  --   --   --  20*   BUN  --   --   --   --   --   --  17.5  --   --   --  18.1  --   --   --  34.6*   CR  --   --   --   --   --   --  0.86  --   --   --  0.75  --   --   --  0.93   ANIONGAP  --   --   --   --   --   --  12  --   --   --  10  --   --   --  19*   OLAF  --   --   --   --   --   --  7.7*  --   --   --  8.5*  --   --   --  10.3*   * 369* 343*   < >  --    < > 215*   < >  --    < > 142*   < >  --    < > 440*   ALBUMIN  --   --   --   --   --   --   --   --   --   --  1.8*  --   --   --  2.9*   PROTTOTAL  --   --   --   --   --   --   --   --   --   --  5.7*  --   --   --  8.0   BILITOTAL  --   --   --   --   --   --   --   --   --   --  0.6  --   --   --  0.8   ALKPHOS  --   --   --   --   --   --   --   --   --   --  184*  --   --   --  318*   ALT  --   --   --   --   --   --   --   --   --   --  22  --   --   --  36*   AST  --   --   --   --   --   --   --   --   --   --  35  --   --   --  54*    < > = values in this interval not displayed.     Recent Labs   Lab Test 11/18/21  0544   CHOL 92   HDL 26*   LDL 30   TRIG 180*     Recent Labs   Lab Test 02/17/23  0752 02/16/23  1939 02/16/23  1431   NA  --   --  132*   POTASSIUM  --   --  4.2   CHLORIDE  --   --  93*   CO2  --   --  20*   *   < > 440*   BUN  --   --  34.6*   CR  --   --  0.93   GFRESTIMATED  --   --  66   OLAF  --   --  10.3*    < > = values in this interval not displayed.     Recent Labs   Lab Test 01/03/23  8041  08/17/22  0532 06/17/21  0553   A1C 7.6* 6.9* 8.1*     Recent Labs   Lab Test 02/17/23  0630 02/16/23  1431 02/03/23  0602   HGB 8.0* 10.5* 11.6*     Recent Labs   Lab Test 04/24/18  1021 04/24/18  0348 04/24/18  0006   TROPONINI <0.01 <0.01 <0.01     Recent Labs   Lab Test 07/23/19  1158   NTBNP 54     Recent Labs   Lab Test 02/03/23  0602   TSH 0.82     No results for input(s): INR in the last 06305 hours.    25 MINUTES SPENT BY ME on the date of service doing chart review, history, exam, documentation & further activities per the note.  Niki Car MD  [facility] Medicine Service  [phone]

## 2023-02-19 NOTE — CONSULTS
Chart reviewed. [place] [facility]. Apparently family does not want patient to return and [place] [facility].     Because LTC is difficult to get in to, anticipate patient will need to [place] [facility] and they can work on transfer to different facility as outpatient.     LTC should be able to do IV abx if needed    Faces[place] to [facility] [place], awaiting response.

## 2023-02-19 NOTE — PLAN OF CARE
Problem: Plan of Care - These are the overarching goals to be used throughout the patient stay.    Goal: Optimal Comfort and Wellbeing  Outcome: Progressing     Problem: Risk for Delirium  Goal: Optimal Coping  Outcome: Progressing     Problem: Pain Acute  Goal: Optimal Pain Control and Function  Outcome: Progressing  Intervention: Prevent or Manage Pain  Recent Flowsheet Documentation  Taken 2/19/2023 1030 by Louie Kaur, RN  Sensory Stimulation Regulation:   care clustered   lighting decreased   quiet environment promoted  Medication Review/Management: medications reviewed   Goal Outcome Evaluation:       Pt's pain managed with scheduled medications. Pt had wound care performed by WO/surgery team. Pt is tolerating IV zosyn well. RN attempted to run Potassium protocol with oral doses. Pt did not tolerate those pills well even when broken in half and placed in pudding. Emesis noted. Pharmacy notified. Pt to have replacement intravenously. Son present in room.  No other concerns noted.   Louie Kaur, [facility]  2/19/2023  2:53 PM

## 2023-02-19 NOTE — PLAN OF CARE
Problem: Plan of Care - These are the overarching goals to be used throughout the patient stay.    Goal: Plan of Care Review  Description: The Plan of Care Review/Shift note should be completed every shift.  The Outcome Evaluation is a brief statement about your assessment that the patient is improving, declining, or no change.  This information will be displayed automatically on your shift note.  Outcome: Progressing     Problem: Pain Acute  Goal: Optimal Pain Control and Function  Outcome: Progressing  Intervention: Develop Pain Management Plan  Recent Flowsheet Documentation  Taken 2/18/2023 2230 by Maegan Keane RN  Pain Management Interventions: medication (see MAR)  Intervention: Prevent or Manage Pain  Recent Flowsheet Documentation  Taken 2/19/2023 0100 by Maegan Keane RN  Medication Review/Management: medications reviewed   Goal Outcome Evaluation:       Dressing changed done. Pt was repositioned. IV Dilaudid 0.2mg given for pain. IV abx given. Unable to collect Potassium and Magnesium, no blood return from PICC line. Handoff report given to [facility] Mccullough

## 2023-02-20 NOTE — CONSULTS
[facility] Nurse Inpatient Assessment     Consulted for: Buttocks, heels    Patient History (according to provider note(s):      Cristal Preciado is a 72 year old female with PMH significant for known sacral decubitus ulcer (present on admission), CVA, dementia, neuropathy, DM2, CKD, CAD and FTT admitted on 2/16/2023 with infected new sacral decubitus ulcer with possible necrotizing fasciitis.      1. Sepsis- Admit patient. Infected Sacral Decubitus ulcer/Possible Necrotizing fasciits,UTI,Lactic acid= 6.4, WBC= 14.1. CT pelvis report reviewed. ED physician has already been in contact with General Surgeon on call and patient will be seen in consultation. Continue Vancomycin, Zosyn and Clindamycin. Continue IV fluids. Currently NPO. Repeat labs in a.m. Monitor vitals closely. Will make further recommendations based on clinical course.     2. Possible Necrotizing Fasciitis- ED has contacted General Surgeon on call and patient will be seen in consultation. Continue Vancomycin, Zosyn and Clindamycin. Plan per surgeon.    Areas Assessed:      Pressure Injury Location: Sacrum    2/20    Wound type: Pressure Injury     Pressure Injury Stage: 4, present on admission   Wound history/plan of care:   Patient admitted with worsening pressure wounds, was taken for an I&D    Wound base: 30 % slough and bone, 70 % dermis, non-granular tissue and adipose tissue     Palpation of the wound bed: boggy      Drainage: scant     Description of drainage: serosanguinous     Measurements (length x width x depth, in cm) 10  x 12  x  3.5 cm      Tunneling N/A     Undermining up to 1.5 cm from 8-11 o'clock  Periwound skin: Intact      Color: normal and consistent with surrounding tissue      Temperature: normal   Odor: none  Pain: moderate, with palpation and during dressing change  Pain intervention prior to dressing change: slow and gentle cares   Treatment goal: Heal , Increase granulation and Protection  STATUS:  initial assessment  Supplies ordered: gathered    My PI Risk Assessment     Sensory Perception: 2 - Very Limited     Moisture: 1 - Constantly moist     Activity: 1 - Bedfast      Mobility: 2 - Very limited     Nutrition: 2 - Probably inadequate      Friction/Shear: 1 - Problem     TOTAL: 9  __________________________________________________________________________________________________________________  [facility]    2/20    Wound type: Pressure Injury     Pressure Injury Stage: 4, present on admission   Wound history/plan of care:   See above    Wound base: 30 % slough and bone, 70 % non-granular tissue and adipose tissue     Palpation of the wound bed: normal      Drainage: scant     Description of drainage: serosanguinous     Measurements (length x width x depth, in cm) 12  x 10.5  x  3 cm      Tunneling N/A     Undermining N/A  Periwound skin: Intact      Color: normal and consistent with surrounding tissue      Temperature: normal   Odor: none  Pain: mild,   Pain intervention prior to dressing change: slow and gentle cares   Treatment goal: Heal , Increase granulation and Protection  STATUS: initial assessment  Supplies ordered: gathered  ________________________________________________________________________________________________________________    Negative pressure wound therapy applied to: Sacrum & KIMBERLY fox   Last photo: see above  Wound due to: Pressure Injury   Wound history/plan of care:      Surgical date: 2/18/23     Date Negative Pressure Wound Therapy initiated: 2/20/23     Interventions in place: repositioning, pressure redistribution with device or dressing, specialty surface in use, moisture/incontinence management and offloading    Is patient s nutritional status compromised? yes   a. If yes, what interventions are in place? Protein supplements    Reason for initiating vac therapy? Presence of co-morbidities, High risk of infections, Need for accelerated  granulation tissue and Prior history of delayed wound healing    Which?of?the?following?co-morbidities?apply? Immobility  a. If diabetic is patient on a diabetic management program? N/A     Is osteomyelitis present in wound? Y  a.  If yes what treatments are in place? N/A    Number of foam pieces removed from a wound (excluding foam for bridge) : initial placement   Verified this matched the number of foam pieces applied last dressing change: N/A   Number of foam pieces packed into wound (excluding foam for bridge) : 4 CleanseChoice Foam   _____________________________________________________________________________________________________________________________________________________________________    Pressure Injury Location: Bilateral heels    2/20 L heel      2/20 R heel    Wound type: Pressure Injury     Pressure Injury Stage: Unstageable, present on admission      Wound history/plan of care:   See above    Wound base: 100 % eschar     Palpation of the wound bed: firm      Drainage: none     Description of drainage: none     Measurements (length x width x depth, in cm)   L heel: 4  x 5.5 cm    R heel: 2  x 2 cm      Tunneling N/A     Undermining N/A  Periwound skin: Intact and Scar tissue      Color: pink      Temperature: normal   Odor: none  Pain: absent, none  Pain intervention prior to dressing change: slow and gentle cares   Treatment goal: Maintain (prevention of deterioration) and offload pressure, reduce friction and shear  STATUS: initial assessment  Supplies ordered: supplies stored on unit      Treatment Plan:     Negative pressure wound therapy plan:  Wound location: Sacrum + R Trocanter    Change Days: Mon/Wed/Fri by WOC RN    Supplies (including all accessories) used: medium Veraflo cleanse choice gray foam, Adapt barrier ring and Cavilon no sting barrier film  Cleanse with Vashe prior to replacing VAC    Suction setting: -125   Methods used: Window paned all periwound skin with vac drape prior  "to applying sponge and barrier rings around entire sacral wound for veriflo     Staff RN to assess integrity of dressing and ensure suction is set at appropriate level every shift.   Date canister. Chart canister output every shift. Change cannister weekly and PRN if full/occluded.  If NS bag for instillation runs dry, spike a new bag.    If the dressing is leaking or machine alarms blockage, stop therapy. Select therapy settings and select VAC therapy, push start. Therapy will begin with suction only. If this does not correct the problems then...    Remove foam dressing and replace with BID normal saline moist gauze dressing if:   -a dressing failure which cannot be repaired within 2 hours   -patient is discharging to home without a home pump   -patient is discharging to a facility outside the local area   -if a dressing is a \"Silver Foam\", remove before Radiation Therapy or MRI     The hospital VAC pump is not to be discharged with the patient.?Ensure to disconnect patient from machine prior to discharge. Then,    - If a home KCI VAC pump has been delivered, connect home cannister to dressing tubing then connect cannister to home pump and turn on machine    - If transferring to a nearby facility with a KCI vac, can disconnect and clamp tubing then cover end with glove so can be reconnected within 2 hours      Heels  1. Paint with betadine daily, allow to dry  2. Offload heels at all times while in bed, utilize prevlon boots    RECOMMEND PRIMARY TEAM ORDER: None, at this time  Education provided: importance of repositioning, wound progress, Infection prevention , Moisture management and Off-loading pressure  Discussed plan of care with: Patient, Family, Nurse and Physician  WO nurse follow-up plan: Monday/WednesdayFriday  Notify WO if wound(s) deteriorate.  Nursing to notify the Provider(s) and re-consult the WOC Nurse if new skin concern.    DATA:     Current support surface: Standard  Standard gel/foam mattress " (IsoFlex, Atmos air, etc)  Containment of urine/stool: Incontinence Protocol  BMI: Body mass index is 26.97 kg/m .   Active diet order: Orders Placed This Encounter      Combination Diet Moderate Consistent Carb (60 g CHO per Meal) Diet; Pureed Diet (level 4); Thin Liquids (level 0)     Output: I/O last 3 completed shifts:  In: 33 [I.V.:33]  Out: -      Labs: Recent Labs   Lab 02/20/23  0643 02/18/23  0247 02/17/23  1035   ALBUMIN  --   --  1.8*   HGB 8.3*   < >  --    WBC 7.4   < >  --     < > = values in this interval not displayed.     Pressure injury risk assessment:   Sensory Perception: 2-->very limited  Moisture: 3-->occasionally moist  Activity: 1-->bedfast  Mobility: 2-->very limited  Nutrition: 1-->very poor  Friction and Shear: 1-->problem  Lalit Score: 10    MUNIRA AMBRIZ RN CWOCN  Pager no longer is use, please contact through [employer] group: WOC Nurse

## 2023-02-20 NOTE — PROGRESS NOTES
[facility]    PROGRESS NOTE - Hospitalist Service    Assessment and Plan    Principal Problem:    Severe sepsis (H)  Active Problems:    CAD (coronary artery disease)    Hypertension    Hyperlipidemia    H/O: stroke    Hypomagnesemia    DM type 2 w Neuro/Retin/Nephr -- hgb A1C 7.6 on 1/3/23    Oropharyngeal dysphagia    Cognitive impairment Consistent with Mild Dementia    Wound infection    Sacral wound, initial encounter    Hypokalemia    Hypercalcemia    Anemia due to blood loss, acute    Cristal Preciado is a 72 year old female with h/o sacral decubitus ulcer (present on admission), CVA, dementia, neuropathy, DM2, CKD, CAD and FTT admitted on 2/16/2023 with infected new sacral decubitus ulcer with possible necrotizing fasciitis.      Sepsis secondary to Infected Sacral Decubitus ulcer/Possible Necrotizing fasciits,  - Lactic acid= 6.4, WBC= 14.1.  - 2/17 s/p  I&D by Dr. Ragsdale   - Continue IV Vancomycin, Zosyn. ID stopped clindamycin  - trend labs   - follow up intraoperative cultures   - blood cx pending NGTD  - WOC for gluteal wound plan to place wound vac today and will address left heel ulcer  - ID consulted IV abx per infectious disease and course   - PICC line in place     DM  - lantus 20 units QAM  - novolog sliding scale medium TID with meals   - victoza     HTN  - metoprolol     ABL anemia Hgb came back at 5.7  - transfused 2 units previously   - transfuse to keep Hgb > 7  - no evidence of blood loss and Hgb stable      UTI  - Urinalysis reviewed and consistent with infection.   - on broad spectrum abx   - Ucx mixed urogenital brittani     Leukocytosis- 2/2 infected sacral decubitus ulcer and UTI.  -  Continue Vancomycin, Zosyn   - WBC normalized      Hyponatremia- Mild. Resolved  - stop IVF  - trend labs      Hypercalcemia- resolved may have been due to dehydration    Hypokalemia  - replacement protocol     hypomag  - getting 2gm IV mag  Recheck stable     H/o dysphagia and son is not  "certain of exact diet but he is aware shes on a modified diet, but uncertain of type  - SLP to seen and VSS  - modified diet per SLP     Heel ulcers will have WO    Dementia- Surrogate decision makers are daughter- Alexandra Preciado [phone] and son- Camron Preciado [phone].      Clinically Significant Risk Factors                        # Overweight: Estimated body mass index is 28.31 kg/m  as calculated from the following:    Height as of this encounter: 1.626 m (5' 4\").    Weight as of this encounter: 74.8 kg (164 lb 14.4 oz)., PRESENT ON ADMISSION         COVID-19 PCR Results    COVID-19 PCR Results 1/14/22 1/18/22 1/21/22 1/25/22 1/28/22 2/1/22 2/4/22 2/8/22 2/11/22 2/15/22   COVID-19 Virus by PCR (External Result)             SARS CoV2 PCR Negative  Negative  Negative  Negative Negative Negative Negative   COVID-19 Virus PCR - Result  NOT DETECTED  NOT DETECTED  NOT DETECTED          Comments are available for some flowsheets but are not being displayed.         COVID-19 Antibody Results, Testing for Immunity    COVID-19 Antibody Results, Testing for Immunity   No data to display.            Code Status: Full Code  VTE prophylaxis:  Pneumatic Compression Devices  DIET: Orders Placed This Encounter      Combination Diet Moderate Consistent Carb (60 g CHO per Meal) Diet; Pureed Diet (level 4); Thin Liquids (level 0)    Drains/Lines: PICC  Weight bearing status: bedrest, per son he does not ambulate   Disposition/Barriers to discharge: pending postop course, and family requesting new LTC, may need to return to same facility while they look for a new placement     Expected Discharge Date:     TBD      Interval History   {Pertinent overnight events  ;none    Subjective:  Awake comfortable, denies any pain. WOC in room and reviewed with her heel wounds and she will address. Son in the room does not speak but deneis any concerns or questions     PHYSICAL EXAM  Temp:  [97.3  F (36.3  C)-98.6  F (37  C)] 97.3  F " (36.3  C)  Pulse:  [63-76] 64  Resp:  [16-20] 16  BP: (104-133)/(52-69) 104/69  SpO2:  [96 %-100 %] 96 %  Wt Readings from Last 1 Encounters:   02/17/23 78.1 kg (172 lb 2.9 oz)       Intake/Output Summary (Last 24 hours) at 2/17/2023 0840  Last data filed at 2/17/2023 0646  Gross per 24 hour   Intake 3450 ml   Output 730 ml   Net 2720 ml      Body mass index is 26.97 kg/m .    Physical Exam  Constitutional:       Comments: Chronically ill-appearing NAD   HENT:      Head: Normocephalic.      Mouth/Throat:      Mouth: Mucous membranes are dry.   Cardiovascular:      Rate and Rhythm: Normal rate and regular rhythm.      Pulses: Normal pulses.      Comments: Soft systolic murmur  Pulmonary:      Effort: Pulmonary effort is normal.      Breath sounds: Normal breath sounds.   Abdominal:      General: Bowel sounds are normal. There is no distension.      Palpations: Abdomen is soft.      Tenderness: There is no abdominal tenderness.   Musculoskeletal:      Comments: Ulcers left heel large black eschar. Right heel eschar smaller ulcer  Dry cracked skin   Skin:     Capillary Refill: Capillary refill takes less than 2 seconds.      Comments: Gluteal wound packed no drainage   Neurological:      Mental Status: She is alert. Mental status is at baseline. She is disoriented.       PERTINENT LABS/IMAGING:  Results for orders placed or performed during the hospital encounter of 02/16/23   CT Pelvis Soft Tissue w Contrast   Result Value Ref Range    Radiologist flags Necrotizing fasciitis (AA)     Impression    IMPRESSION:  1.  New sacral decubitus ulcer with subcutaneous emphysema extending into the surrounding soft tissues. This could represent necrotizing fasciitis.  2.  A new right greater trochanter decubitus ulcer.  3.  Cystitis.      [Critical Result: Necrotizing fasciitis]    Finding was identified on 2/16/2023 6:35 PM.     Dr. Singer was contacted by me on 2/16/2023 6:42 PM and verbalized understanding of the critical  result.          Recent Labs   Lab 02/20/23  0643 02/20/23  0214 02/19/23  2317 02/19/23  2133 02/19/23  1152 02/19/23  1106 02/18/23  1715 02/18/23  1221 02/18/23  0647 02/18/23  0247 02/17/23  1217 02/17/23  1035 02/16/23  1939 02/16/23  1431   WBC 7.4  --   --   --   --  8.0  --   --   --  10.0  --   --    < > 14.1*   HGB 8.3*  --   --   --   --  7.4*  --  8.8*  --  5.7*  --   --    < > 10.5*   MCV 90  --   --   --   --  89  --   --   --  95  --   --    < > 94     --   --   --   --  209  --   --   --  224  --   --    < > 369     --   --   --   --   --   --   --   --  139  --  136  --  132*   POTASSIUM 4.4  --  4.1  --   --  3.0*  --   --   --  5.1  5.1   < > 2.8*  --  4.2   CHLORIDE 109*  --   --   --   --   --   --   --   --  109*  --  103  --  93*   CO2 21*  --   --   --   --   --   --   --   --  18*  --  23  --  20*   BUN 7.5*  --   --   --   --   --   --   --   --  17.5  --  18.1  --  34.6*   CR 0.76  --   --   --   --  0.74  --   --   --  0.86  --  0.75  --  0.93   ANIONGAP 6*  --   --   --   --   --   --   --   --  12  --  10  --  19*   OLAF 7.5*  --   --   --   --   --   --   --   --  7.7*  --  8.5*  --  10.3*   * 148*  --  181*   < >  --    < >  --    < > 215*   < > 142*   < > 440*   ALBUMIN  --   --   --   --   --   --   --   --   --   --   --  1.8*  --  2.9*   PROTTOTAL  --   --   --   --   --   --   --   --   --   --   --  5.7*  --  8.0   BILITOTAL  --   --   --   --   --   --   --   --   --   --   --  0.6  --  0.8   ALKPHOS  --   --   --   --   --   --   --   --   --   --   --  184*  --  318*   ALT  --   --   --   --   --   --   --   --   --   --   --  22  --  36*   AST  --   --   --   --   --   --   --   --   --   --   --  35  --  54*    < > = values in this interval not displayed.     Recent Labs   Lab Test 11/18/21  0544   CHOL 92   HDL 26*   LDL 30   TRIG 180*     Recent Labs   Lab Test 02/17/23  0752 02/16/23  1939 02/16/23  1431   NA  --   --  132*   POTASSIUM  --   --  4.2    CHLORIDE  --   --  93*   CO2  --   --  20*   *   < > 440*   BUN  --   --  34.6*   CR  --   --  0.93   GFRESTIMATED  --   --  66   OLAF  --   --  10.3*    < > = values in this interval not displayed.     Recent Labs   Lab Test 01/03/23  2250 08/17/22  0532 06/17/21  0553   A1C 7.6* 6.9* 8.1*     Recent Labs   Lab Test 02/17/23  0630 02/16/23  1431 02/03/23  0602   HGB 8.0* 10.5* 11.6*     Recent Labs   Lab Test 04/24/18  1021 04/24/18  0348 04/24/18  0006   TROPONINI <0.01 <0.01 <0.01     Recent Labs   Lab Test 07/23/19  1158   NTBNP 54     Recent Labs   Lab Test 02/03/23  0602   TSH 0.82     No results for input(s): INR in the last 10570 hours.    25 MINUTES SPENT BY ME on the date of service doing chart review, history, exam, documentation & further activities per the note.  Niki Car MD  [facility] Medicine Service  [phone]

## 2023-02-20 NOTE — PROGRESS NOTES
ASSESSMENT:  1. Sacral wound, initial encounter    2. Wound infection    3. Severe sepsis (H)        Cristal Preciaod is a 72 year old female who is s/p incisions and drainage of chronic ulcers of the sacrum and the right hip  on 2/17/23. Wounds without signs of infection or necrotic tissue; cauterized tissue present that should autolytic debride with dressing changes.    PLAN:  Appreciate WOC input and assessment of readiness for wound vac  Continue dressing changes as ordered until seen by WOC; then follow WOC recommendations  Surgery will continue to follow    SUBJECTIVE:   She is doing ok. Pain is minimal. Passing flatus and tolerating diet.       Patient Vitals for the past 24 hrs:   BP Temp Temp src Pulse Resp SpO2   02/20/23 0747 135/65 97.5  F (36.4  C) Oral 67 16 100 %   02/19/23 2300 104/69 97.3  F (36.3  C) Oral 64 16 96 %   02/19/23 2038 105/52 -- -- 76 -- --   02/19/23 1522 133/63 98.6  F (37  C) Oral 63 18 100 %         PHYSICAL EXAM:  GEN: No acute distress, comfortable  LUNGS: CTA bilaterally  WOUND: sacral wound not granulated with minimal slough and no necrotic tissue noted; hip wound not granulated with cauterized black tissue scattered across wound bed, no slough or necrotic tissue noted; both wounds repacked with Vashe moistened gauze  EXT:No cyanosis, edema or obvious abnormalities    02/19 0700 - 02/20 0659  In: 33 [I.V.:33]  Out: -     No results displayed because visit has over 200 results.             Evie Garcia, LAMONT CNP

## 2023-02-20 NOTE — PROGRESS NOTES
Patient refusing most cares, refuses to be turned and repositioned, yells when incontinence cares are preformed.  Pre medicate for repositioning as able.  Son in room and he is helpful in encouraging her to take medications.  Cares explained to patient and son, comprehension limited by patients dementia/

## 2023-02-20 NOTE — PLAN OF CARE
"Pt is alert oriented to self only. VSS. Pt continues to have constant pain in buttock and back. Was repositioned every 2 hrs, IV dilaudid was given twice in the evening shift. Tylenol given once for a headache. Pt is unable to get comfortable in bed. Repositioned as needed for comfort. Pt's children at bedside for most of the shift, son sleeping overnight. Son was irritated with staff about  not answering call lights in time and commented, \"I know y'all can her yelling in pain out there.\" When writer tried to explain to pt that we cannot always hear pts from the nurses' station and will need for pt/family to use call light, son seemed irritated and placed his airpods back in his ears and ignored nurse. Explained to pt instead about what to do and repositioned pt and offered interventions for pain relief.    Pt had hypoglycemia event before dinner. CNA did not notify of low BG right away to RN writer. Orange juice was given as first treatment and BG fell lower from 66 to 52. PRN glucose gel was given as pt complained of poor appetite and did not want to eat or drink very much. Recheck was 65. Pt did not want to take another glucose gel, thus IV dextrose was given and BG came back up to 115. Sticky note left for MD.      Problem: Plan of Care - These are the overarching goals to be used throughout the patient stay.    Goal: Patient-Specific Goal (Individualized)  Description: You can add care plan individualizations to a care plan. Examples of Individualization might be:  \"Parent requests to be called daily at 9am for status\", \"I have a hard time hearing out of my right ear\", or \"Do not touch me to wake me up as it startles me\".  Outcome: Progressing     Problem: Risk for Delirium  Goal: Optimal Coping  Outcome: Progressing     Problem: Pain Acute  Goal: Optimal Pain Control and Function  Outcome: Progressing  Intervention: Prevent or Manage Pain  Recent Flowsheet Documentation  Taken 2/19/2023 1700 by Tyson, " Mary Lou GIL RN  Sensory Stimulation Regulation: quiet environment promoted     Problem: Plan of Care - These are the overarching goals to be used throughout the patient stay.    Goal: Absence of Hospital-Acquired Illness or Injury  Intervention: Prevent and Manage VTE (Venous Thromboembolism) Risk  Recent Flowsheet Documentation  Taken 2/19/2023 1700 by Mary Lou Mehta RN  VTE Prevention/Management: SCDs (sequential compression devices) on     Problem: Risk for Delirium  Goal: Improved Attention and Thought Clarity  Intervention: Maximize Cognitive Function  Recent Flowsheet Documentation  Taken 2/19/2023 1700 by Mary Lou Mehta, RN  Sensory Stimulation Regulation: quiet environment promoted  Reorientation Measures: clock in view

## 2023-02-20 NOTE — PLAN OF CARE
Speech Language Therapy Discharge Summary    Reason for therapy discharge:    All goals and outcomes met, no further needs identified.    Progress towards therapy goal(s). See goals on Care Plan in [facility] electronic health record for goal details.  Goals met    Therapy recommendation(s):    No further therapy is recommended. Pt lying on her side at about 45 degrees. She is currently on puree/thin and has been tolerating this diet while in a 45 degree position d/t pain in sacrum. I analyzed her drinking 2 oz continually thin water from straw wtih no oral dysphagia or overt sx's aspiration. Pt refused puree and stated there were no issues with pureed pancake that she had finished. When asked if she would like to try upgraded textures or stay on puree she stated that she prefers t he puree at this time. I stated that if she changed her mind we can come back and reassess. She verbalized understandng.    Goal Outcome Evaluation:

## 2023-02-20 NOTE — PLAN OF CARE
Problem: Plan of Care - These are the overarching goals to be used throughout the patient stay.    Goal: Absence of Hospital-Acquired Illness or Injury  Intervention: Prevent Skin Injury  Recent Flowsheet Documentation  Taken 2/20/2023 0350 by Marisa Garcia RN  Body Position:   weight shifting   lower extremity elevated   left  Taken 2/20/2023 0223 by Marisa Garcia RN  Body Position:   turned   left     Problem: Plan of Care - These are the overarching goals to be used throughout the patient stay.    Goal: Optimal Comfort and Wellbeing  Intervention: Monitor Pain and Promote Comfort  Recent Flowsheet Documentation  Taken 2/20/2023 0414 by Marisa Garcia RN  Pain Management Interventions:   medication (see MAR)   repositioned  Taken 2/20/2023 0223 by Marisa Garcia RN  Pain Management Interventions: medication (see MAR)     Problem: Risk for Delirium  Goal: Improved Sleep  Outcome: Progressing     Problem: Electrolyte Imbalance  Goal: Electrolyte Imbalance: Plan of Care  Outcome: Progressing   Goal Outcome Evaluation:  Pt slept intermittently this shift.  Continues to c/o pain all over but more specifically in wound area.  Dilaudid given x1 with minimal relief.  Pt also c/o itching.  Reached out to house officer with request for pain meds and med for itching.  Obtained a one time order for oxycodone 2.5mg and a one time order for Benadryl.  Pt took without incident.  Continues on iv zosyn this shift.  Son in room overnight with pt.  Recheck potassium at the start of the shift was 4.1.  No replacement needed.  Recheck in am.  Pt oriented to self.  Does not use call light.  Encouraged repositioning on left side this shift. Pt tolerated though extremely reluctant to reposition.  Dressing to buttock clean dry and intact.  Noted black heal wound with dried scab.

## 2023-02-20 NOTE — PROGRESS NOTES
[facility]    Infectious Disease Progress Note    Date of Service : 02/20/2023     Assessment & Plan   Cristal Preciado is a 72 year old female who was admitted on 2/16/2023.     ASSESSMENT:  1. CVA, sacral decubitus ulceration on admission, status post debridement of sacral ulcer and debridement of right hip ulcer on 2/17/2023  2. Osteomyelitis, both ulcerations go down to bone per operative report  3. Infected decubitus ulcer: Proteus, Enterococcus, E. coli cultures, all sensitive to Zosyn.  4. Multiple comorbid conditions: Diabetes, cognitive impairment, sacral wound, coronary artery disease, hypertension, hyperlipidemia        RECOMMENDATIONS:    1. Discontinue IV vancomycin.  2. Continue IV Zosyn.  Antibiotics vancomycin, Zosyn  3. Monitor CBC, CMP.  4. Okay to place PICC line at any point going forward.  5. Plan for 4 to 6 weeks of IV Zosyn.  6. Care coordinator consulted.    Discussed with patient, nursing staff.    ID will follow.    Giovanna Nelson MD  [facility]  [phone]      Interval History   Resting comfortably  Physical Exam   Temp: 97.5  F (36.4  C) Temp src: Oral BP: 135/65 Pulse: 67   Resp: 16 SpO2: 100 % O2 Device: None (Room air)    Vitals:    02/17/23 1129   Weight: 78.1 kg (172 lb 2.9 oz)     Vital Signs with Ranges  Temp:  [97.3  F (36.3  C)-98.6  F (37  C)] 97.5  F (36.4  C)  Pulse:  [63-76] 67  Resp:  [16-18] 16  BP: (104-135)/(52-69) 135/65  SpO2:  [96 %-100 %] 100 %    Constitutional: No apparent distress  Lungs: normal breathing pattern, no crackles or wheezing  Cardiovascular: Regular rate and rhythm, S1 S2  Abdomen: Slightly distended  Skin: warm  Ulcerations, see photos above decubitus  Neuro: deconditioned, CVA    Media Information  Document Information    Other:  Photograph      02/16/2023 7:15 PM   Attached To:   Hospital Encounter on 2/16/23     Source Information    Waylon Singer MD  [facility]     Media  Information  Document Information    Other:  Photograph      02/16/2023 7:14 PM   Attached To:   Hospital Encounter on 2/16/23     Source Information    Waylon Singer MD  [facility]         Medications     - MEDICATION INSTRUCTIONS -         atorvastatin  80 mg Oral Daily     diclofenac  2 g Topical 4x Daily     dorzolamide-timolol  1 drop Both Eyes BID     DULoxetine  90 mg Oral QAM     insulin aspart  1-7 Units Subcutaneous TID AC     insulin glargine  20 Units Subcutaneous QAM AC     latanoprost  1 drop Both Eyes At Bedtime     liraglutide  1.2 mg Subcutaneous Daily     metoprolol tartrate  50 mg Oral BID     olopatadine  1 drop Both Eyes Daily     pantoprazole  40 mg Oral BID AC     piperacillin-tazobactam  3.375 g Intravenous Q8H     polyvinyl alcohol  1 drop Both Eyes TID     sodium chloride (PF)  10-30 mL Intracatheter Q8H     sodium chloride (PF)  3 mL Intracatheter Q8H     traZODone  50 mg Oral At Bedtime     vancomycin  1,250 mg Intravenous Q24H       Data   All microbiology laboratory data reviewed.  Recent Labs   Lab Test 02/20/23  0643 02/19/23  1106 02/18/23  1221 02/18/23  0247   WBC 7.4 8.0  --  10.0   HGB 8.3* 7.4* 8.8* 5.7*   HCT 26.0* 23.2*  --  17.8*   MCV 90 89  --  95    209  --  224     Recent Labs   Lab Test 02/20/23  0643 02/19/23  1106 02/18/23  0247   CR 0.76 0.74 0.86     Recent Labs   Lab Test 02/16/23  1431   *     Recent Labs   Lab Test 08/06/19  0835 04/29/15  1420   CULT No growth No growth after 4 weeks       MICROBIOLOGY:    Reviewed   Contains abnormal data Wound Aerobic Bacterial Culture Routine  Order: [id number]   Collected 2/16/2023  2:32 PM      Status: Preliminary result      Visible to patient: No (not released)     Specimen Information: Leg, Right; Wound    0 Result Notes  Culture Culture in progress       2+ Proteus mirabilis Abnormal        1+ Enterococcus faecalis Abnormal        1+ Escherichia coli Abnormal     Preliminary result, confirmation  pending.   4+ Normal brittani            Resulting Agency: [facility]     Susceptibility     Proteus mirabilis Enterococcus faecalis     CARLA CARLA     Ampicillin <=2 ug/mL Susceptible <=2 ug/mL Susceptible     Ampicillin/ Sulbactam <=2 ug/mL Susceptible       Cefepime <=1 ug/mL Susceptible       Ceftazidime <=1 ug/mL Susceptible       Ceftriaxone <=1 ug/mL Susceptible       Ciprofloxacin <=0.25 ug/mL Susceptible       Gentamicin <=1 ug/mL Susceptible       Gentamicin Synergy   Susceptible... Susceptible 1     Levofloxacin <=0.12 ug/mL Susceptible       Meropenem <=0.25 ug/mL Susceptible       Penicillin   4 ug/mL Susceptible     Piperacillin/Tazobactam <=4 ug/mL Susceptible       Tobramycin <=1 ug/mL Susceptible       Trimethoprim/Sulfamethoxazole <=1/19 ug/mL Susceptible       Vancomycin   2 ug/mL Susceptible                1 No high level gentamicin resistance found - therefore combination therapy with an aminoglycoside may be indicated for serious enterococcal infections such as bacteremia and endocarditis.            Specimen Collected: 02/16/23  2:32 PM Last Resulted: 02/19/23  3:21 PM           Abscess Aerobic Bacterial Culture Routine  Order: [id number]   Collected 2/17/2023  8:45 PM      Status: Preliminary result      Visible to patient: No (not released)     Specimen Information: Hip, Right; Abscess    0 Result Notes  Culture 2+ Escherichia coli Abnormal        2+ Proteus mirabilis Abnormal        1+ Enterococcus faecalis Abnormal             Resulting Agency: [facility]     Susceptibility     Escherichia coli Proteus mirabilis Enterococcus faecalis     CARLA CARLA CARLA     Ampicillin <=2 ug/mL Susceptible <=2 ug/mL Susceptible <=2 ug/mL Susceptible     Ampicillin/ Sulbactam <=2 ug/mL Susceptible <=2 ug/mL Susceptible       Cefepime <=1 ug/mL Susceptible <=1 ug/mL Susceptible       Ceftazidime <=1 ug/mL Susceptible <=1 ug/mL Susceptible       Ceftriaxone <=1 ug/mL Susceptible <=1 ug/mL Susceptible       Ciprofloxacin <=0.25  ug/mL Susceptible <=0.25 ug/mL Susceptible       Gentamicin <=1 ug/mL Susceptible <=1 ug/mL Susceptible       Gentamicin Synergy     Susceptible... Susceptible 1     Levofloxacin <=0.12 ug/mL Susceptible <=0.12 ug/mL Susceptible       Meropenem <=0.25 ug/mL Susceptible <=0.25 ug/mL Susceptible       Penicillin     4 ug/mL Susceptible     Piperacillin/Tazobactam <=4 ug/mL Susceptible <=4 ug/mL Susceptible       Tobramycin <=1 ug/mL Susceptible <=1 ug/mL Susceptible       Trimethoprim/Sulfamethoxazole <=1/19 ug/mL Susceptible <=1/19 ug/mL Susceptible       Vancomycin     2 ug/mL Susceptible                  1 No high level gentamicin resistance found - therefore combination therapy with an aminoglycoside may be indicated for serious enterococcal infections such as bacteremia and endocarditis.            Specimen Collected: 02/17/23  8:45 PM Last Resulted: 02/20/23 12:42 AM                  7-Day Micro Results     Collected Updated Procedure Result Status      02/17/2023 2045 02/20/2023 0116 Anaerobic Bacterial Culture Routine [[id number]]   Abscess from Hip, Right    Preliminary result Component Value   Culture No anaerobic organisms isolated after 2 days  [P]                02/17/2023 2045 02/18/2023 0132 Gram Stain [[id number]]   (Abnormal)   Abscess from Hip, Right    Final result Component Value   Gram Stain Result 3+ Gram positive cocci   Gram Stain Result 2+ Gram negative bacilli   Gram Stain Result 4+ WBC seen   Predominantly PMNs            02/17/2023 2045 02/20/2023 0042 Abscess Aerobic Bacterial Culture Routine [[id number]]    (Abnormal)   Abscess from Hip, Right    Preliminary result Component Value   Culture 2+ Escherichia coli  [P]     2+ Proteus mirabilis  [P]     1+ Enterococcus faecalis  [P]         Susceptibility      Escherichia coli      CARLA      Ampicillin <=2 ug/mL Susceptible      Ampicillin/ Sulbactam <=2 ug/mL Susceptible      Cefepime <=1 ug/mL Susceptible      Ceftazidime <=1 ug/mL  Susceptible      Ceftriaxone <=1 ug/mL Susceptible      Ciprofloxacin <=0.25 ug/mL Susceptible      Gentamicin <=1 ug/mL Susceptible      Levofloxacin <=0.12 ug/mL Susceptible      Meropenem <=0.25 ug/mL Susceptible      Piperacillin/Tazobactam <=4 ug/mL Susceptible      Tobramycin <=1 ug/mL Susceptible      Trimethoprim/Sulfamethoxazole <=1/19 ug/mL Susceptible                    Susceptibility      Proteus mirabilis      CARLA      Ampicillin <=2 ug/mL Susceptible      Ampicillin/ Sulbactam <=2 ug/mL Susceptible      Cefepime <=1 ug/mL Susceptible      Ceftazidime <=1 ug/mL Susceptible      Ceftriaxone <=1 ug/mL Susceptible      Ciprofloxacin <=0.25 ug/mL Susceptible      Gentamicin <=1 ug/mL Susceptible      Levofloxacin <=0.12 ug/mL Susceptible      Meropenem <=0.25 ug/mL Susceptible      Piperacillin/Tazobactam <=4 ug/mL Susceptible      Tobramycin <=1 ug/mL Susceptible      Trimethoprim/Sulfamethoxazole <=1/19 ug/mL Susceptible                    Susceptibility      Enterococcus faecalis      CARLA      Ampicillin <=2 ug/mL Susceptible      Gentamicin Synergy Susceptible ug/mL Susceptible  [1]       Penicillin 4 ug/mL Susceptible      Vancomycin 2 ug/mL Susceptible                   [1]  No high level gentamicin resistance found - therefore combination therapy with an aminoglycoside may be indicated for serious enterococcal infections such as bacteremia and endocarditis.                 02/16/2023 1924 02/18/2023 0534 Urine Culture [[id number]]   Urine, Midstream    Final result Component Value   Culture 10,000-50,000 CFU/mL Mixture of urogenital brittani               02/16/2023 1924 02/18/2023 0641 Urine Culture [[id number]]   Urine, Midstream    Final result Component Value   Culture 50,000-100,000 CFU/mL Mixture of urogenital brittani               02/16/2023 1600 02/19/2023 2201 Blood Culture Peripheral Blood [[id number]]   Peripheral Blood    Preliminary result Component Value   Culture No growth after 3  days  [P]                02/16/2023 1432 02/19/2023 1520 Wound Aerobic Bacterial Culture Routine [[id number]]    (Abnormal)   Wound from Back, Lower    Final result Component Value   Culture 1+ Escherichia coli    1+ Proteus mirabilis    2+ Enterococcus faecalis    2+ Normal brittani    4+ Corynebacterium striatum    Identification obtained by MALDI-TOF mass spectrometry research use only database. Test characteristics determined and verified by the [facility].Susceptibilities not routinely done        Susceptibility      Escherichia coli      CARLA      Ampicillin <=2 ug/mL Susceptible      Ampicillin/ Sulbactam <=2 ug/mL Susceptible      Cefepime <=1 ug/mL Susceptible      Ceftazidime <=1 ug/mL Susceptible      Ceftriaxone <=1 ug/mL Susceptible      Ciprofloxacin <=0.25 ug/mL Susceptible      Gentamicin <=1 ug/mL Susceptible      Levofloxacin <=0.12 ug/mL Susceptible      Meropenem <=0.25 ug/mL Susceptible      Piperacillin/Tazobactam <=4 ug/mL Susceptible      Tobramycin <=1 ug/mL Susceptible      Trimethoprim/Sulfamethoxazole <=1/19 ug/mL Susceptible                    Susceptibility      Proteus mirabilis      CARLA      Ampicillin <=2 ug/mL Susceptible      Ampicillin/ Sulbactam <=2 ug/mL Susceptible      Cefepime <=1 ug/mL Susceptible      Ceftazidime <=1 ug/mL Susceptible      Ceftriaxone <=1 ug/mL Susceptible      Ciprofloxacin <=0.25 ug/mL Susceptible      Gentamicin <=1 ug/mL Susceptible      Levofloxacin <=0.12 ug/mL Susceptible      Meropenem <=0.25 ug/mL Susceptible      Piperacillin/Tazobactam <=4 ug/mL Susceptible      Tobramycin <=1 ug/mL Susceptible      Trimethoprim/Sulfamethoxazole <=1/19 ug/mL Susceptible                    Susceptibility      Enterococcus faecalis      CARLA      Ampicillin <=2 ug/mL Susceptible      Gentamicin Synergy Susceptible ug/mL Susceptible  [1]       Penicillin 4 ug/mL Susceptible      Vancomycin 2 ug/mL Susceptible                   [1]  No  high level gentamicin resistance found - therefore combination therapy with an aminoglycoside may be indicated for serious enterococcal infections such as bacteremia and endocarditis.                 02/16/2023 1432 02/19/2023 1521 Wound Aerobic Bacterial Culture Routine [[id number]]    (Abnormal)   Wound from Leg, Right    Preliminary result Component Value   Culture Culture in progress  [P]     2+ Proteus mirabilis  [P]     1+ Enterococcus faecalis  [P]     1+ Escherichia coli  [P]     Preliminary result, confirmation pending.    4+ Normal brittani  [P]         Susceptibility      Proteus mirabilis      CARLA      Ampicillin <=2 ug/mL Susceptible      Ampicillin/ Sulbactam <=2 ug/mL Susceptible      Cefepime <=1 ug/mL Susceptible      Ceftazidime <=1 ug/mL Susceptible      Ceftriaxone <=1 ug/mL Susceptible      Ciprofloxacin <=0.25 ug/mL Susceptible      Gentamicin <=1 ug/mL Susceptible      Levofloxacin <=0.12 ug/mL Susceptible      Meropenem <=0.25 ug/mL Susceptible      Piperacillin/Tazobactam <=4 ug/mL Susceptible      Tobramycin <=1 ug/mL Susceptible      Trimethoprim/Sulfamethoxazole <=1/19 ug/mL Susceptible                    Susceptibility      Enterococcus faecalis      CARLA      Ampicillin <=2 ug/mL Susceptible      Gentamicin Synergy Susceptible ug/mL Susceptible  [1]       Penicillin 4 ug/mL Susceptible      Vancomycin 2 ug/mL Susceptible                   [1]  No high level gentamicin resistance found - therefore combination therapy with an aminoglycoside may be indicated for serious enterococcal infections such as bacteremia and endocarditis.                 02/16/2023 1431 02/19/2023 2201 Blood Culture Line, venous [[id number]]   Blood from Line, venous    Preliminary result Component Value   Culture No growth after 3 days  [P]                       RADIOLOGY:    Reviewed  CT Pelvis Soft Tissue w Contrast    Result Date: 2/16/2023  EXAM: CT PELVIS SOFT TISSUE W CONTRAST LOCATION: [facility] DATE/TIME: 2/16/2023 6:31 PM INDICATION: Malodorous wounds to left buttock and right hip, severe sepsis on lab testing today COMPARISON: 1/3/2023 TECHNIQUE: CT scan of the pelvis was performed with IV contrast. Multiplanar reformats were obtained. Dose reduction techniques were used. CONTRAST: 100ml isovue 370 FINDINGS: Mild motion artifact. PELVIC ORGANS: Increased circumferential bladder wall thickening measuring up to 0.8 cm is consistent with cystitis. Hysterectomy. Atherosclerotic vascular disease. MUSCULOSKELETAL: A new sacral decubitus ulcer with subcutaneous emphysema extending into the surrounding soft tissues with associated inflammatory changes. A new decubitus ulcer at the right greater trochanter with surrounding inflammatory changes. No loculated drainable fluid collection. No aggressive osseous erosion. Degenerative changes of the spine and hips.     IMPRESSION: 1.  New sacral decubitus ulcer with subcutaneous emphysema extending into the surrounding soft tissues. This could represent necrotizing fasciitis. 2.  A new right greater trochanter decubitus ulcer. 3.  Cystitis. [Critical Result: Necrotizing fasciitis] Finding was identified on 2/16/2023 6:35 PM. Dr. Singer was contacted by me on 2/16/2023 6:42 PM and verbalized understanding of the critical result.

## 2023-02-21 NOTE — PROGRESS NOTES
[facility]    PROGRESS NOTE - Hospitalist Service    Assessment and Plan    Principal Problem:    Severe sepsis (H)  Active Problems:    CAD (coronary artery disease)    Hypertension    Hyperlipidemia    H/O: stroke    Hypomagnesemia    DM type 2 w Neuro/Retin/Nephr -- hgb A1C 7.6 on 1/3/23    Oropharyngeal dysphagia    Cognitive impairment Consistent with Mild Dementia    Wound infection    Sacral wound, initial encounter    Hypokalemia    Hypercalcemia    Anemia due to blood loss, acute    Cristal Preciado is a 72 year old female with h/o sacral decubitus ulcer (present on admission), CVA, dementia, neuropathy, DM2, CKD, CAD and FTT admitted on 2/16/2023 with infected new sacral decubitus ulcer with possible necrotizing fasciitis.      Severe sepsis secondary to Infected Sacral Decubitus ulcer/Possible Necrotizing fasciits,  - Lactic acid= 6.4, WBC= 14.1. Sepsis criteria met with leukocytosis and tachycardia likely source being infected decubitus ulcers and with lactic acidosis  - 2/17 s/p  I&D by Dr. Ragsdale   - Initially treated with IV Vancomycin, Zosyn. ID consulted and stopped clindamycin  - Cultures growing Proteus, E.coli and Enterococcus sensitive to Zosyn  - blood cx NGTD  - WOC for gluteal wound plan to place wound vac today and will address left heel ulcer   - PICC line in place   - Per ID, plan for 4-6 weeks of IV Zosyn    Type II DM  - HgbA1c on 01/03/2023 is 7.6 %  - Patient is refusing to eat and has had hypoglycemic episodes  - Reduce lantus to 15 units subcutaneous QAM  - Hold PTA Victoza and Metformin  - Continue Accucheck, Insulin sliding scale and hypoglycemic protocol  - Star [id number] at 75 ml/hr    HTN  - metoprolol     ABL anemia Hgb came back at 5.7  - transfused 2 units previously   - transfuse to keep Hgb > 7  - no evidence of blood loss and Hgb stable      UTI  - Urinalysis reviewed and consistent with infection.   - on broad spectrum abx   - Ucx mixed urogenital  "brittani     Leukocytosis- 2/2 infected sacral decubitus ulcer and UTI.  - Continue Vancomycin, Zosyn   - WBC normalized      Hyponatremia- Mild. Resolved. Likely due to hypovolemia and poor oral intake  - Back on IVF  - Na level is now normal     Hypercalcemia- resolved may have been due to dehydration    Hypokalemia  - replacement protocol     Hypomagnesemia  - Replace per protocol    H/o dysphagia and son is not certain of exact diet but he is aware shes on a modified diet, but uncertain of type  - RN D/W SLP and patient was on a Level 4 diet because son was not sure what level diet she was on PTA  - Reviewed Video swallow study done on 10/07/2022  - Okay to advance to an Easy to Chew with thin liquid diet  - Encourage PO intake  -  Encourage oral intake    Heel ulcers will have WOC assess    Dementia- Surrogate decision makers are daughter- Alexandra Preciado [phone] and son- Camron Preciado [phone].      Clinically Significant Risk Factors                        # Overweight: Estimated body mass index is 28.31 kg/m  as calculated from the following:    Height as of this encounter: 1.626 m (5' 4\").    Weight as of this encounter: 74.8 kg (164 lb 14.4 oz)., PRESENT ON ADMISSION         COVID-19 PCR Results    COVID-19 PCR Results 1/14/22 1/18/22 1/21/22 1/25/22 1/28/22 2/1/22 2/4/22 2/8/22 2/11/22 2/15/22   COVID-19 Virus by PCR (External Result)             SARS CoV2 PCR Negative  Negative  Negative  Negative Negative Negative Negative   COVID-19 Virus PCR - Result  NOT DETECTED  NOT DETECTED  NOT DETECTED          Comments are available for some flowsheets but are not being displayed.         COVID-19 Antibody Results, Testing for Immunity    COVID-19 Antibody Results, Testing for Immunity   No data to display.            Code Status: Full Code  VTE prophylaxis:  Pneumatic Compression Devices  DIET: Orders Placed This Encounter      Combination Diet Moderate Consistent Carb (60 g CHO per Meal) Diet; Pureed " Diet (level 4); Thin Liquids (level 0)    Drains/Lines: PICC  Weight bearing status: bedrest, per son he does not ambulate   Disposition/Barriers to discharge: pending postop course, and family requesting new LTC, may need to return to same facility while they look for a new placement     Expected Discharge Date:     TBD      Interval History   Pertinent overnight events: None    Subjective:  Patient refused her insulin and her medications today. Does not like the consistency of the food and hence does not want to eat either and blood sugars have been running low  Son present. Says that since the surgery she has not wanted to eat or take her meds  Patient is not a very reliable historian but denies any breathing problems and any pain    PHYSICAL EXAM  Temp:  [97.6  F (36.4  C)-98.3  F (36.8  C)] 97.6  F (36.4  C)  Pulse:  [64-69] 64  Resp:  [16-20] 20  BP: (135-144)/(62-63) 144/63  SpO2:  [95 %-99 %] 95 %  Wt Readings from Last 1 Encounters:   02/17/23 78.1 kg (172 lb 2.9 oz)       Intake/Output Summary (Last 24 hours) at 2/17/2023 0840  Last data filed at 2/17/2023 0646  Gross per 24 hour   Intake 3450 ml   Output 730 ml   Net 2720 ml      Body mass index is 26.97 kg/m .    Physical Exam  Constitutional:       Comments: Chronically ill-appearing NAD   HENT:      Head: Normocephalic.      Mouth/Throat:      Mouth: Mucous membranes are dry.   Cardiovascular:      Rate and Rhythm: Normal rate and regular rhythm.      Pulses: Normal pulses.      Comments: Soft systolic murmur  Pulmonary:      Effort: Pulmonary effort is normal. No respiratory distress.      Breath sounds: Normal breath sounds.   Abdominal:      General: Bowel sounds are normal. There is no distension.      Palpations: Abdomen is soft.      Tenderness: There is no abdominal tenderness.   Musculoskeletal:      Cervical back: Neck supple.      Right lower leg: Edema present.      Left lower leg: Edema present.      Comments: Ulcers left heel large black  eschar. Right heel eschar smaller ulcer  Right hip wound VAC  Dry cracked skin   Skin:     General: Skin is warm and dry.   Neurological:      Mental Status: She is alert. Mental status is at baseline. She is disoriented.       PERTINENT LABS/IMAGING:  Results for orders placed or performed during the hospital encounter of 02/16/23   CT Pelvis Soft Tissue w Contrast   Result Value Ref Range    Radiologist flags Necrotizing fasciitis (AA)     Impression    IMPRESSION:  1.  New sacral decubitus ulcer with subcutaneous emphysema extending into the surrounding soft tissues. This could represent necrotizing fasciitis.  2.  A new right greater trochanter decubitus ulcer.  3.  Cystitis.      [Critical Result: Necrotizing fasciitis]    Finding was identified on 2/16/2023 6:35 PM.     Dr. Singer was contacted by me on 2/16/2023 6:42 PM and verbalized understanding of the critical result.          Recent Labs   Lab 02/21/23  0838 02/21/23  0656 02/21/23  0453 02/21/23  0408 02/20/23  0757 02/20/23  0643 02/20/23  0214 02/19/23  2317 02/19/23  1152 02/19/23  1106 02/18/23  1715 02/18/23  1221 02/18/23  0647 02/18/23  0247 02/17/23  1217 02/17/23  1035 02/16/23  1939 02/16/23  1431   WBC  --   --   --   --   --  7.4  --   --   --  8.0  --   --   --  10.0  --   --    < > 14.1*   HGB  --   --   --   --   --  8.3*  --   --   --  7.4*  --  8.8*  --  5.7*  --   --    < > 10.5*   MCV  --   --   --   --   --  90  --   --   --  89  --   --   --  95  --   --    < > 94   PLT  --   --   --   --   --  236  --   --   --  209  --   --   --  224  --   --    < > 369   NA  --   --   --   --   --  136  --   --   --   --   --   --   --  139  --  136  --  132*   POTASSIUM  --  4.4  --   --   --  4.4  --  4.1  --  3.0*  --   --   --  5.1  5.1   < > 2.8*  --  4.2   CHLORIDE  --   --   --   --   --  109*  --   --   --   --   --   --   --  109*  --  103  --  93*   CO2  --   --   --   --   --  21*  --   --   --   --   --   --   --  18*  --  23  --   20*   BUN  --   --   --   --   --  7.5*  --   --   --   --   --   --   --  17.5  --  18.1  --  34.6*   CR  --   --   --   --   --  0.76  --   --   --  0.74  --   --   --  0.86  --  0.75  --  0.93   ANIONGAP  --   --   --   --   --  6*  --   --   --   --   --   --   --  12  --  10  --  19*   OLAF  --   --   --   --   --  7.5*  --   --   --   --   --   --   --  7.7*  --  8.5*  --  10.3*   *  --  159* 62*   < > 122*   < >  --    < >  --    < >  --    < > 215*   < > 142*   < > 440*   ALBUMIN  --   --   --   --   --   --   --   --   --   --   --   --   --   --   --  1.8*  --  2.9*   PROTTOTAL  --   --   --   --   --   --   --   --   --   --   --   --   --   --   --  5.7*  --  8.0   BILITOTAL  --   --   --   --   --   --   --   --   --   --   --   --   --   --   --  0.6  --  0.8   ALKPHOS  --   --   --   --   --   --   --   --   --   --   --   --   --   --   --  184*  --  318*   ALT  --   --   --   --   --   --   --   --   --   --   --   --   --   --   --  22  --  36*   AST  --   --   --   --   --   --   --   --   --   --   --   --   --   --   --  35  --  54*    < > = values in this interval not displayed.     Recent Labs   Lab Test 11/18/21  0544   CHOL 92   HDL 26*   LDL 30   TRIG 180*     Recent Labs   Lab Test 02/17/23  0752 02/16/23  1939 02/16/23  1431   NA  --   --  132*   POTASSIUM  --   --  4.2   CHLORIDE  --   --  93*   CO2  --   --  20*   *   < > 440*   BUN  --   --  34.6*   CR  --   --  0.93   GFRESTIMATED  --   --  66   OLAF  --   --  10.3*    < > = values in this interval not displayed.     Recent Labs   Lab Test 01/03/23  2250 08/17/22  0532 06/17/21  0553   A1C 7.6* 6.9* 8.1*     Recent Labs   Lab Test 02/17/23  0630 02/16/23  1431 02/03/23  0602   HGB 8.0* 10.5* 11.6*     Recent Labs   Lab Test 04/24/18  1021 04/24/18  0348 04/24/18  0006   TROPONINI <0.01 <0.01 <0.01     Recent Labs   Lab Test 07/23/19  1158   NTBNP 54     Recent Labs   Lab Test 02/03/23  0602   TSH 0.82     No results for  input(s): INR in the last [id number] hours.    Reta Frias MD

## 2023-02-21 NOTE — PROGRESS NOTES
[facility]    Infectious Disease Progress Note    Date of Service : 02/21/2023     Assessment & Plan   Cristal Preciado is a 72 year old female who was admitted on 2/16/2023.     ASSESSMENT:  1. CVA, sacral decubitus ulceration on admission, status post debridement of sacral ulcer and debridement of right hip ulcer on 2/17/2023  2. Osteomyelitis, both ulcerations go down to bone per operative report  3. Infected decubitus ulcer: Proteus, Enterococcus, E. coli cultures, all sensitive to Zosyn.  Tolerating Zosyn well.  4. Multiple comorbid conditions: Diabetes, cognitive impairment, sacral wound, coronary artery disease, hypertension, hyperlipidemia        RECOMMENDATIONS:  1. Continue IV Zosyn.  2. Monitor CBC, CMP.  3. Okay to place PICC line at any point going forward.  4. Plan for 4 to 6 weeks of IV Zosyn.  5. Care coordinator consulted.    Discussed with patient, nursing staff.    ID will follow.    Giovanna Nelson MD  [facility]  [phone]      Interval History   Has no complaint today.  Son at bedside.  No complaints.      Physical Exam   Temp: 97.6  F (36.4  C) Temp src: Oral BP: (!) 144/63 Pulse: 64   Resp: 20 SpO2: 95 % O2 Device: None (Room air)    Vitals:    02/17/23 1129   Weight: 78.1 kg (172 lb 2.9 oz)     Vital Signs with Ranges  Temp:  [97.6  F (36.4  C)-98.3  F (36.8  C)] 97.6  F (36.4  C)  Pulse:  [64-69] 64  Resp:  [16-20] 20  BP: (135-144)/(62-63) 144/63  SpO2:  [95 %-99 %] 95 %    Constitutional: No apparent distress  Lungs: normal breathing pattern, no crackles or wheezing  Cardiovascular: Regular rate and rhythm, S1 S2  Abdomen: Slightly distended  Skin: warm  Ulcerations, see photos above decubitus  Neuro: deconditioned, CVA    Media Information  Document Information    Other:  Photograph      02/16/2023 7:15 PM   Attached To:   Hospital Encounter on 2/16/23     Source Information    Waylon Singer MD  [facility]     Media  Information  Document Information    Other:  Photograph      02/16/2023 7:14 PM   Attached To:   Hospital Encounter on 2/16/23     Source Information    Waylon Singer MD  [facility]         Medications     - MEDICATION INSTRUCTIONS -         atorvastatin  80 mg Oral QPM     diclofenac  2 g Topical 4x Daily     dorzolamide-timolol  1 drop Both Eyes BID     DULoxetine  90 mg Oral QAM     insulin aspart  1-7 Units Subcutaneous TID AC     [START ON 2/22/2023] insulin glargine  15 Units Subcutaneous QAM AC     latanoprost  1 drop Both Eyes At Bedtime     [Held by provider] liraglutide  1.2 mg Subcutaneous Daily     metoprolol tartrate  50 mg Oral BID     olopatadine  1 drop Both Eyes Daily     pantoprazole  40 mg Oral BID AC     piperacillin-tazobactam  3.375 g Intravenous Q8H     polyvinyl alcohol  1 drop Both Eyes TID     sodium chloride (PF)  10-30 mL Intracatheter Q8H     sodium chloride (PF)  3 mL Intracatheter Q8H     traZODone  50 mg Oral At Bedtime       Data   All microbiology laboratory data reviewed.  Recent Labs   Lab Test 02/20/23  0643 02/19/23  1106 02/18/23  1221 02/18/23  0247   WBC 7.4 8.0  --  10.0   HGB 8.3* 7.4* 8.8* 5.7*   HCT 26.0* 23.2*  --  17.8*   MCV 90 89  --  95    209  --  224     Recent Labs   Lab Test 02/20/23  0643 02/19/23  1106 02/18/23  0247   CR 0.76 0.74 0.86     Recent Labs   Lab Test 02/16/23  1431   *     Recent Labs   Lab Test 08/06/19  0835 04/29/15  1420   CULT No growth No growth after 4 weeks       MICROBIOLOGY:    Reviewed   Contains abnormal data Wound Aerobic Bacterial Culture Routine  Order: [id number]   Collected 2/16/2023  2:32 PM      Status: Preliminary result      Visible to patient: No (not released)     Specimen Information: Leg, Right; Wound    0 Result Notes  Culture Culture in progress       2+ Proteus mirabilis Abnormal        1+ Enterococcus faecalis Abnormal        1+ Escherichia coli Abnormal     Preliminary result, confirmation  pending.   4+ Normal brittani            Resulting Agency: [facility]     Susceptibility     Proteus mirabilis Enterococcus faecalis     CARLA CARLA     Ampicillin <=2 ug/mL Susceptible <=2 ug/mL Susceptible     Ampicillin/ Sulbactam <=2 ug/mL Susceptible       Cefepime <=1 ug/mL Susceptible       Ceftazidime <=1 ug/mL Susceptible       Ceftriaxone <=1 ug/mL Susceptible       Ciprofloxacin <=0.25 ug/mL Susceptible       Gentamicin <=1 ug/mL Susceptible       Gentamicin Synergy   Susceptible... Susceptible 1     Levofloxacin <=0.12 ug/mL Susceptible       Meropenem <=0.25 ug/mL Susceptible       Penicillin   4 ug/mL Susceptible     Piperacillin/Tazobactam <=4 ug/mL Susceptible       Tobramycin <=1 ug/mL Susceptible       Trimethoprim/Sulfamethoxazole <=1/19 ug/mL Susceptible       Vancomycin   2 ug/mL Susceptible                1 No high level gentamicin resistance found - therefore combination therapy with an aminoglycoside may be indicated for serious enterococcal infections such as bacteremia and endocarditis.            Specimen Collected: 02/16/23  2:32 PM Last Resulted: 02/19/23  3:21 PM           Abscess Aerobic Bacterial Culture Routine  Order: [id number]   Collected 2/17/2023  8:45 PM      Status: Preliminary result      Visible to patient: No (not released)     Specimen Information: Hip, Right; Abscess    0 Result Notes  Culture 2+ Escherichia coli Abnormal        2+ Proteus mirabilis Abnormal        1+ Enterococcus faecalis Abnormal             Resulting Agency: [facility]     Susceptibility     Escherichia coli Proteus mirabilis Enterococcus faecalis     CARLA CARLA CARLA     Ampicillin <=2 ug/mL Susceptible <=2 ug/mL Susceptible <=2 ug/mL Susceptible     Ampicillin/ Sulbactam <=2 ug/mL Susceptible <=2 ug/mL Susceptible       Cefepime <=1 ug/mL Susceptible <=1 ug/mL Susceptible       Ceftazidime <=1 ug/mL Susceptible <=1 ug/mL Susceptible       Ceftriaxone <=1 ug/mL Susceptible <=1 ug/mL Susceptible       Ciprofloxacin <=0.25  ug/mL Susceptible <=0.25 ug/mL Susceptible       Gentamicin <=1 ug/mL Susceptible <=1 ug/mL Susceptible       Gentamicin Synergy     Susceptible... Susceptible 1     Levofloxacin <=0.12 ug/mL Susceptible <=0.12 ug/mL Susceptible       Meropenem <=0.25 ug/mL Susceptible <=0.25 ug/mL Susceptible       Penicillin     4 ug/mL Susceptible     Piperacillin/Tazobactam <=4 ug/mL Susceptible <=4 ug/mL Susceptible       Tobramycin <=1 ug/mL Susceptible <=1 ug/mL Susceptible       Trimethoprim/Sulfamethoxazole <=1/19 ug/mL Susceptible <=1/19 ug/mL Susceptible       Vancomycin     2 ug/mL Susceptible                  1 No high level gentamicin resistance found - therefore combination therapy with an aminoglycoside may be indicated for serious enterococcal infections such as bacteremia and endocarditis.            Specimen Collected: 02/17/23  8:45 PM Last Resulted: 02/20/23 12:42 AM                  7-Day Micro Results     Collected Updated Procedure Result Status      02/17/2023 2045 02/20/2023 1502 Anaerobic Bacterial Culture Routine [[id number]]   (Abnormal)   Abscess from Hip, Right    Preliminary result Component Value   Culture 1+ Bacteroides ovatus/xylanisolvens  [P]     Susceptibilities not routinely done, refer to antibiogram to view typical susceptibility profiles               02/17/2023 2045 02/18/2023 0132 Gram Stain [[id number]]   (Abnormal)   Abscess from Hip, Right    Final result Component Value   Gram Stain Result 3+ Gram positive cocci   Gram Stain Result 2+ Gram negative bacilli   Gram Stain Result 4+ WBC seen   Predominantly PMNs            02/17/2023 2045 02/20/2023 1147 Abscess Aerobic Bacterial Culture Routine [[id number]]    (Abnormal)   Abscess from Hip, Right    Final result Component Value   Culture 2+ Escherichia coli    2+ Proteus mirabilis    1+ Enterococcus faecalis    3+ Normal brittani        Susceptibility      Escherichia coli      CARLA      Ampicillin <=2 ug/mL Susceptible      Ampicillin/  Sulbactam <=2 ug/mL Susceptible      Cefepime <=1 ug/mL Susceptible      Ceftazidime <=1 ug/mL Susceptible      Ceftriaxone <=1 ug/mL Susceptible      Ciprofloxacin <=0.25 ug/mL Susceptible      Gentamicin <=1 ug/mL Susceptible      Levofloxacin <=0.12 ug/mL Susceptible      Meropenem <=0.25 ug/mL Susceptible      Piperacillin/Tazobactam <=4 ug/mL Susceptible      Tobramycin <=1 ug/mL Susceptible      Trimethoprim/Sulfamethoxazole <=1/19 ug/mL Susceptible                    Susceptibility      Proteus mirabilis      CARLA      Ampicillin <=2 ug/mL Susceptible      Ampicillin/ Sulbactam <=2 ug/mL Susceptible      Cefepime <=1 ug/mL Susceptible      Ceftazidime <=1 ug/mL Susceptible      Ceftriaxone <=1 ug/mL Susceptible      Ciprofloxacin <=0.25 ug/mL Susceptible      Gentamicin <=1 ug/mL Susceptible      Levofloxacin <=0.12 ug/mL Susceptible      Meropenem <=0.25 ug/mL Susceptible      Piperacillin/Tazobactam <=4 ug/mL Susceptible      Tobramycin <=1 ug/mL Susceptible      Trimethoprim/Sulfamethoxazole <=1/19 ug/mL Susceptible                    Susceptibility      Enterococcus faecalis      CARLA      Ampicillin <=2 ug/mL Susceptible      Gentamicin Synergy Susceptible ug/mL Susceptible  [1]       Penicillin 4 ug/mL Susceptible      Vancomycin 2 ug/mL Susceptible                   [1]  No high level gentamicin resistance found - therefore combination therapy with an aminoglycoside may be indicated for serious enterococcal infections such as bacteremia and endocarditis.                 02/16/2023 1924 02/18/2023 0534 Urine Culture [[id number]]   Urine, Midstream    Final result Component Value   Culture 10,000-50,000 CFU/mL Mixture of urogenital brittani               02/16/2023 1924 02/18/2023 0641 Urine Culture [[id number]]   Urine, Midstream    Final result Component Value   Culture 50,000-100,000 CFU/mL Mixture of urogenital brittani               02/16/2023 1600 02/20/2023 2201 Blood Culture Peripheral Blood  [[id number]]   Peripheral Blood    Preliminary result Component Value   Culture No growth after 4 days  [P]                02/16/2023 1432 02/19/2023 1520 Wound Aerobic Bacterial Culture Routine [[id number]]    (Abnormal)   Wound from Back, Lower    Final result Component Value   Culture 1+ Escherichia coli    1+ Proteus mirabilis    2+ Enterococcus faecalis    2+ Normal brittani    4+ Corynebacterium striatum    Identification obtained by MALDI-TOF mass spectrometry research use only database. Test characteristics determined and verified by the [facility].Susceptibilities not routinely done        Susceptibility      Escherichia coli      CARLA      Ampicillin <=2 ug/mL Susceptible      Ampicillin/ Sulbactam <=2 ug/mL Susceptible      Cefepime <=1 ug/mL Susceptible      Ceftazidime <=1 ug/mL Susceptible      Ceftriaxone <=1 ug/mL Susceptible      Ciprofloxacin <=0.25 ug/mL Susceptible      Gentamicin <=1 ug/mL Susceptible      Levofloxacin <=0.12 ug/mL Susceptible      Meropenem <=0.25 ug/mL Susceptible      Piperacillin/Tazobactam <=4 ug/mL Susceptible      Tobramycin <=1 ug/mL Susceptible      Trimethoprim/Sulfamethoxazole <=1/19 ug/mL Susceptible                    Susceptibility      Proteus mirabilis      CARLA      Ampicillin <=2 ug/mL Susceptible      Ampicillin/ Sulbactam <=2 ug/mL Susceptible      Cefepime <=1 ug/mL Susceptible      Ceftazidime <=1 ug/mL Susceptible      Ceftriaxone <=1 ug/mL Susceptible      Ciprofloxacin <=0.25 ug/mL Susceptible      Gentamicin <=1 ug/mL Susceptible      Levofloxacin <=0.12 ug/mL Susceptible      Meropenem <=0.25 ug/mL Susceptible      Piperacillin/Tazobactam <=4 ug/mL Susceptible      Tobramycin <=1 ug/mL Susceptible      Trimethoprim/Sulfamethoxazole <=1/19 ug/mL Susceptible                    Susceptibility      Enterococcus faecalis      CARLA      Ampicillin <=2 ug/mL Susceptible      Gentamicin Synergy Susceptible ug/mL Susceptible  [1]        Penicillin 4 ug/mL Susceptible      Vancomycin 2 ug/mL Susceptible                   [1]  No high level gentamicin resistance found - therefore combination therapy with an aminoglycoside may be indicated for serious enterococcal infections such as bacteremia and endocarditis.                 02/16/2023 1432 02/21/2023 0744 Wound Aerobic Bacterial Culture Routine [[id number]]    (Abnormal)   Wound from Leg, Right    Final result Component Value   Culture 2+ Proteus mirabilis    1+ Enterococcus faecalis    1+ Escherichia coli    4+ Normal brittani        Susceptibility      Proteus mirabilis      CARLA      Ampicillin <=2 ug/mL Susceptible      Ampicillin/ Sulbactam <=2 ug/mL Susceptible      Cefepime <=1 ug/mL Susceptible      Ceftazidime <=1 ug/mL Susceptible      Ceftriaxone <=1 ug/mL Susceptible      Ciprofloxacin <=0.25 ug/mL Susceptible      Gentamicin <=1 ug/mL Susceptible      Levofloxacin <=0.12 ug/mL Susceptible      Meropenem <=0.25 ug/mL Susceptible      Piperacillin/Tazobactam <=4 ug/mL Susceptible      Tobramycin <=1 ug/mL Susceptible      Trimethoprim/Sulfamethoxazole <=1/19 ug/mL Susceptible                    Susceptibility      Enterococcus faecalis      CARLA      Ampicillin <=2 ug/mL Susceptible      Gentamicin Synergy Susceptible ug/mL Susceptible  [1]       Penicillin 4 ug/mL Susceptible      Vancomycin 2 ug/mL Susceptible                   [1]  No high level gentamicin resistance found - therefore combination therapy with an aminoglycoside may be indicated for serious enterococcal infections such as bacteremia and endocarditis.          Susceptibility      Escherichia coli      CARLA      Ampicillin <=2 ug/mL Susceptible      Ampicillin/ Sulbactam <=2 ug/mL Susceptible      Cefepime <=1 ug/mL Susceptible      Ceftazidime <=1 ug/mL Susceptible      Ceftriaxone <=1 ug/mL Susceptible      Ciprofloxacin <=0.25 ug/mL Susceptible      Gentamicin <=1 ug/mL Susceptible      Levofloxacin <=0.12 ug/mL  Susceptible      Meropenem <=0.25 ug/mL Susceptible      Piperacillin/Tazobactam <=4 ug/mL Susceptible      Tobramycin <=1 ug/mL Susceptible      Trimethoprim/Sulfamethoxazole <=1/19 ug/mL Susceptible                           02/16/2023 1431 02/20/2023 2201 Blood Culture Line, venous [[id number]]   Blood from Line, venous    Preliminary result Component Value   Culture No growth after 4 days  [P]                       RADIOLOGY:    Reviewed  CT Pelvis Soft Tissue w Contrast    Result Date: 2/16/2023  EXAM: CT PELVIS SOFT TISSUE W CONTRAST LOCATION: [facility] DATE/TIME: 2/16/2023 6:31 PM INDICATION: Malodorous wounds to left buttock and right hip, severe sepsis on lab testing today COMPARISON: 1/3/2023 TECHNIQUE: CT scan of the pelvis was performed with IV contrast. Multiplanar reformats were obtained. Dose reduction techniques were used. CONTRAST: 100ml isovue 370 FINDINGS: Mild motion artifact. PELVIC ORGANS: Increased circumferential bladder wall thickening measuring up to 0.8 cm is consistent with cystitis. Hysterectomy. Atherosclerotic vascular disease. MUSCULOSKELETAL: A new sacral decubitus ulcer with subcutaneous emphysema extending into the surrounding soft tissues with associated inflammatory changes. A new decubitus ulcer at the right greater trochanter with surrounding inflammatory changes. No loculated drainable fluid collection. No aggressive osseous erosion. Degenerative changes of the spine and hips.     IMPRESSION: 1.  New sacral decubitus ulcer with subcutaneous emphysema extending into the surrounding soft tissues. This could represent necrotizing fasciitis. 2.  A new right greater trochanter decubitus ulcer. 3.  Cystitis. [Critical Result: Necrotizing fasciitis] Finding was identified on 2/16/2023 6:35 PM. Dr. Singer was contacted by me on 2/16/2023 6:42 PM and verbalized understanding of the critical result.

## 2023-02-21 NOTE — PROGRESS NOTES
Vascular Referral Intake    Referred by: Evie Garcia CNP for pressure injury of sacral region    Specialty: Wound Clinic    Specific Provider if Necessary: Has 2 options: can see MD Reggie Vann and go over options and goals for this concern and potentially lead to seeing plastic surgery. Or she can just see Dr. Bonner, plastic surgeon at [facility], because wounds are so big and would likely need flap surgery pending on Dr. Bonner's evaluation.     Visit Type: Wound Clinic    Time Frame: Next Available    Testing/Imaging Needed Before Consult: NA    Appt Note: (to be pasted into appt comments, also add where additional information is located, ie, outside images pushed to PACS, in Epic, sent to [facility], etc)  I&D on sacral ulcer and right hip ulcer on 2/17/23 by Dr. Ragsdale. Also has necrotic bilateral heels.

## 2023-02-21 NOTE — CONSULTS
"CLINICAL NUTRITION SERVICES - REASSESSMENT NOTE    See RD's initial assessment on 02/18/23 - reassessed based on consult today    Recommendations Ordered by Registered Dietitian (RD):   RD ordered new wt (measure new wt when able)  RD switched 1 of the 2 glucerna to vanilla magic cup with meal (breafast and dinner)  RD ordered calorie counts starting on 2/22   Future/Additional Recommendations:   Po intake, wt, wound healing   Malnutrition: moderate malnutrition in context of acute illness     EVALUATION OF PROGRESS TOWARD GOALS   Diet:  Moderate Consistent Carb diet (60 g CHO), Pureed Diet    Supplements: Glucerna twice/day    Nutrition Support:  None     Intake/Tolerance:  50% intake recorded on 2/18 dinner  Information obtained from pt (limited), pt's family was observed in the room. Writer was unable to talk to family as he was asleep.  Pt likely did not drink Glucerna per conversation.  Pt only can recall to have some orange juice today.  Pt mentioned that she had decreased food intake and appetite for long time PTA, unknown how long.  Writer offered supplement \"ice cream\" (magic cup), pt is open to the idea and reports wanting to try vanilla flavor. - given circumstance that pt is not eating much and had hypoglycemia episodes. Writer will send 1 vanilla magic cup to provide some flavor and nutrition needs.     Lab:  Glucose monitoring:   hypoglycemia started since night time on 2/20 (51 - 90 mg/dL)  Midnight on 2/21 glucose back to 159 mg/dL (glucose gel given); 126 mg/dL this morning     Medication:  Duloxetine, lantus (discontinued on 2/21 morning), liraglutide (on hold), lopressor, protonix, oxycodone,   Dextrose 50% injection at 50 mL (given today) = provides 85 kcal    GI:  2/21/23 last recorded in flowsheet    ANTHROPOMETRICS  Height: 5'7\"  Most Recent Weight: 78.1 kg (172 lb 2.9 oz)    IBW: 61.4 kg (135 lb)  BMI: Overweight BMI 25-29.9  Past wt history:   Wt Readings from Last 5 Encounters:   02/17/23 " 78.1 kg (172 lb 2.9 oz)   02/09/23 62.1 kg (137 lb)   01/13/23 65.9 kg (145 lb 3.2 oz)   12/20/22 69.1 kg (152 lb 4.8 oz)   12/08/22 71.7 kg (158 lb)     ASSESSED NUTRITION NEEDS:  Dosing Weight:  65.6 kg (adjusted wt)    Estimated Energy Needs: 5489-1596 kcals/day (25 - 30 kcals/kg)  Justification: Maintenance  Estimated Protein Needs: 79-99 grams protein/day (1.2 - 1.5 grams of pro/kg)  Justification: Wound healing  Estimated Fluid Needs: 1968 mL/day ({30 ml/Kg)   Justification: Maintenance    NEW FINDINGS:   Significant wound on sacrum (pressure injury stage 4); R trocanter (pressure injury stage 4), Bilateral heel (unstageable pressure injury, present on admission)     Previous Goals:   Total avg nutritional intake to meet a minimum of 1640 kcal/kg and 79 g protein/day - not met  Wound healing - unable to assess at this time  -180 - not met; hypoglycemia: see lab section    Previous Nutrition Diagnosis:   Increased nutrient needs Protein related to multiple wounds as evidenced by need for wound healing    Evaluation: ongoing, not progressing    MALNUTRITION  % Weight Loss:  Unable to assess wt since hospital stay due to no new wt (will order a new wt)  % Intake:  </= 50% for >/= 5 days (severe malnutrition) - unable to determine the exact date; pt has been eating little or none since hospital. Writer suspected to have longer decreased food intake period PTA per conversation  Subcutaneous Fat Loss:  None observed - only can observe face  Muscle Loss:  Unable to observe - pt got frustrated with questions, not appropriate time to evaluate  Fluid Retention:  Mild: left arm & right hand; trace: generalized edema    Malnutrition Diagnosis: moderate malnutrition in context of acute illness    CURRENT NUTRITION DIAGNOSIS  Malnutrition related to poor intake as evidenced by less than 50% food intake since admission (likely poor po intake for a while PTA per conversation).     INTERVENTIONS  Recommendations /  Nutrition Prescription  Encouraged po intake when able (ideally higher in protein).     Implementation  Composition of Meals and Snacks - pt is open to try vanilla magic cup once/day  Order Calorie counts starting 2/22 to determine nutrition intakes progress due to high needs for estimated nutrition needs for wound recovery.    - consider the needs of initiating nutrition support if oral intake is not progressing.    Goals  P.O. intake >50% of estimated nutrition needs, or equivalent with supplements.     MONITORING AND EVALUATION:  Progress towards goals will be monitored and evaluated per protocol and Practice Guidelines    Ira Kingston ([place]mber) Dietetic Intern

## 2023-02-21 NOTE — PLAN OF CARE
Goal Outcome Evaluation:      Plan of Care Reviewed With: patient, child    Overall Patient Progress: improvingOverall Patient Progress: improving    Outcome Evaluation: wound bed clean, patient afebrile.  Refusing all turns and repositioning this shift.  Explained consequences of not turning to reduce pressure on bony pronimences

## 2023-02-21 NOTE — PROGRESS NOTES
"Pt very uncooperative this morning. Refused all medications, eye gtts, insulins, assessments x's 2 Yelling \"I am not doing anything until my daughter gets here\". Tried talking with the son who was sleeping in the chair but he did not respond to this writer. Updated Dr. Frias. Approached the third time with Dr. Frias in the room. Pt took medications and insulins. Refused all eye gtts. Able to complete most of assessment.  "

## 2023-02-21 NOTE — SIGNIFICANT EVENT
Pt's BS at 1630 was 46. 120 ml's Apple Juice given. D 50% 25 ml given at 1656. Rechecked at 1715, BS 81. Dr. Frias updated. New lantus orders. Pt did order supper and is on a calorie count x's 3 days. Will continue to monitor.    Pt did start on D5% LR with potassium chloride 20 mEq at 1543.

## 2023-02-21 NOTE — PLAN OF CARE
Problem: Plan of Care - These are the overarching goals to be used throughout the patient stay.    Goal: Optimal Comfort and Wellbeing  Intervention: Monitor Pain and Promote Comfort  Recent Flowsheet Documentation  Taken 2/21/2023 0407 by Marisa Garcia RN  Pain Management Interventions: medication (see MAR)     Problem: Diabetes Comorbidity  Goal: Blood Glucose Level Within Targeted Range  Outcome: Progressing   Goal Outcome Evaluation:  Pt pleasant and cooperative at the start of the shift.  Pt making appropriate requests.  Used bedpan and had bm.  Vac dressing intact and patent.  Pt reported pain improved with vac placement though mid shift shift did request oxycodone for pain. Pt repositioned as she would allow.  Assisted with logrolling but still requiring 2 assist.  Pt had hypoglycemic event overnight 51.  Asymptomatic and did agree to orange juice which she drank in full.  Recheck was 70.  Additional recheck at 0408 62 and refused anything orally.  Attempted glucose gel with pt took on taste of and spit it out.  25ml dextrose given iv at this point as pt refused anything po even with son encouraging her to do so.  Recheck blood glucose 159.  Note left for MD.  Pt's mood fluctuates and was irritable and uncooperative this am.

## 2023-02-21 NOTE — PROGRESS NOTES
ASSESSMENT:  1. Pressure injury of sacral region, unstageable (H)    2. Sacral wound, initial encounter    3. Wound infection    4. Severe sepsis (H)      Cristal Preciado is a 72 year old female who is s/p incisions and drainage of chronic ulcers of the sacrum and the right hip  on 2/17/23. Wound vac is in place.      PLAN:  Wound vac per WOC RN  Follow up outpatient with wound clinic; referral placed  Surgery will sign off; please reconsult us if/when patient needs further debridement    SUBJECTIVE:   She has no pain. She says the wound vac is not bothering her. She is cold but afebrile.      Patient Vitals for the past 24 hrs:   BP Temp Temp src Pulse Resp SpO2   02/20/23 2326 136/63 98.3  F (36.8  C) Oral 66 16 98 %   02/20/23 1541 135/62 97.6  F (36.4  C) Oral 69 16 99 %         PHYSICAL EXAM:  GEN: No acute distress, comfortable  LUNGS: CTA bilaterally  WOUNDS: wound vac in place in both wounds and holding good suction; surrounding tissue intact    02/20 0700 - 02/21 0659  In: 354 [P.O.:354]  Out: 1600 [Urine:1600]    No results displayed because visit has over 200 results.             Evie Garcia, LAMONT CNP

## 2023-02-21 NOTE — PLAN OF CARE
Goal Outcome Evaluation:         Problem: Pain Acute  Goal: Optimal Pain Control and Function  Outcome: Progressing     Problem: Diabetes Comorbidity  Goal: Blood Glucose Level Within Targeted Range  Outcome: Progressing     Blood sugar 90 at bedtime. Patient complained of pain middle back. Voltaren ointment applied to the area. Patient took hs medication whole in applesauce. Eye drops instilled bilateral eye. Son present in the room. VAC dressing intact. PureWick in use.

## 2023-02-21 NOTE — PLAN OF CARE
"Problem: Plan of Care - These are the overarching goals to be used throughout the patient stay.    Goal: Plan of Care Review  Description: The Plan of Care Review/Shift note should be completed every shift.  The Outcome Evaluation is a brief statement about your assessment that the patient is improving, declining, or no change.  This information will be displayed automatically on your shift note.  Outcome: Progressing  Flowsheets (Taken 2/21/2023 1420)  Plan of Care Reviewed With:   patient   child    Problem: Pain Acute  Goal: Optimal Pain Control and Function  Outcome: Progressing    Problem: Diabetes Comorbidity  Goal: Blood Glucose Level Within Targeted Range  Outcome: Progressing    Pt alert to self. Pt did take some medications after re-approaching 3 times,  refused eye gtts. Most of the time pt will \"yell\" what she wants and will yell when responding to your question.   Wound VAC in place.   and 76. Pt has refused meals. Did drink some OJ. Doctor updated. New insulin orders.  Pt states she has pain in her bottom. Unable to rate pain. PRN Tylenol and Oxycodone given with some relief as pt was able to fall asleep.   Purewick in place.                              "

## 2023-02-22 NOTE — PLAN OF CARE
"  Problem: Plan of Care - These are the overarching goals to be used throughout the patient stay.    Goal: Plan of Care Review  Description: The Plan of Care Review/Shift note should be completed every shift.  The Outcome Evaluation is a brief statement about your assessment that the patient is improving, declining, or no change.  This information will be displayed automatically on your shift note.  Outcome: Progressing  Goal: Patient-Specific Goal (Individualized)  Description: You can add care plan individualizations to a care plan. Examples of Individualization might be:  \"Parent requests to be called daily at 9am for status\", \"I have a hard time hearing out of my right ear\", or \"Do not touch me to wake me up as it startles me\".  Outcome: Progressing  Goal: Absence of Hospital-Acquired Illness or Injury  Outcome: Progressing  Intervention: Identify and Manage Fall Risk  Recent Flowsheet Documentation  Taken 2/22/2023 0115 by Liseth Watkins RN  Safety Promotion/Fall Prevention:   bed alarm on   increase visualization of patient   lighting adjusted   fall prevention program maintained  Intervention: Prevent Skin Injury  Recent Flowsheet Documentation  Taken 2/22/2023 0448 by Liseth Watkins RN  Body Position:   turned   right  Taken 2/22/2023 0115 by Liseth Watkins RN  Body Position:   turned   left   legs elevated   heels elevated  Intervention: Prevent and Manage VTE (Venous Thromboembolism) Risk  Recent Flowsheet Documentation  Taken 2/22/2023 0115 by Liseth Watkins RN  VTE Prevention/Management: SCDs (sequential compression devices) on  Goal: Optimal Comfort and Wellbeing  Outcome: Progressing  Intervention: Monitor Pain and Promote Comfort  Recent Flowsheet Documentation  Taken 2/22/2023 0520 by Liseth Watkins RN  Pain Management Interventions:   medication (see MAR)   emotional support  Taken 2/22/2023 0115 by Liseth Watkins RN  Pain Management Interventions:   medication (see MAR)   " emotional support   repositioned  Intervention: Provide Person-Centered Care  Recent Flowsheet Documentation  Taken 2/22/2023 0115 by Liseth Watkins, RN  Trust Relationship/Rapport:   care explained   questions answered   questions encouraged   reassurance provided  Goal: Readiness for Transition of Care  Outcome: Progressing     Problem: Risk for Delirium  Goal: Optimal Coping  Outcome: Progressing  Goal: Improved Behavioral Control  Outcome: Progressing  Intervention: Minimize Safety Risk  Recent Flowsheet Documentation  Taken 2/22/2023 0115 by Liseth Watkins, RN  Trust Relationship/Rapport:   care explained   questions answered   questions encouraged   reassurance provided  Goal: Improved Attention and Thought Clarity  Outcome: Progressing  Goal: Improved Sleep  Outcome: Progressing     Problem: Pain Acute  Goal: Optimal Pain Control and Function  Outcome: Progressing  Intervention: Develop Pain Management Plan  Recent Flowsheet Documentation  Taken 2/22/2023 0520 by Liseth Watkins, RN  Pain Management Interventions:   medication (see MAR)   emotional support  Taken 2/22/2023 0115 by Liseth Watkins, RN  Pain Management Interventions:   medication (see MAR)   emotional support   repositioned     Problem: Electrolyte Imbalance  Goal: Electrolyte Imbalance: Plan of Care  Outcome: Progressing     Problem: Diabetes Comorbidity  Goal: Blood Glucose Level Within Targeted Range  Outcome: Progressing     Problem: Violence Risk or Actual  Goal: Anger and Impulse Control  Outcome: Progressing     Problem: Hypertension Acute  Goal: Blood Pressure Within Desired Range  Outcome: Progressing   Goal Outcome Evaluation:         Pt alert/ confused. VSS on room air. C/o pain up to a 10 in buttocks. Prn oxycodone given x2. Spot check blood sugar 91, D5LR+20 K  running. Po encouraged but pt refused except one bite of applesauce with each pill. Wound vac canister changed. Will monitor.

## 2023-02-22 NOTE — PLAN OF CARE
Problem: Plan of Care - These are the overarching goals to be used throughout the patient stay.    Goal: Absence of Hospital-Acquired Illness or Injury  Intervention: Identify and Manage Fall Risk  Recent Flowsheet Documentation  Taken 2/22/2023 0800 by Kasandra Soto RN  Safety Promotion/Fall Prevention:   bed alarm on   increase visualization of patient   lighting adjusted   fall prevention program maintained     Problem: Plan of Care - These are the overarching goals to be used throughout the patient stay.    Goal: Absence of Hospital-Acquired Illness or Injury  Outcome: Progressing     Problem: Plan of Care - These are the overarching goals to be used throughout the patient stay.    Goal: Optimal Comfort and Wellbeing  Outcome: Progressing     Problem: Plan of Care - These are the overarching goals to be used throughout the patient stay.    Goal: Readiness for Transition of Care  Outcome: Progressing   Goal Outcome Evaluation: Pt alert and quite, refused to be feed, iv zosyn infusing, family at bedside, wound care done, pt sleeping off and on.  Mag replaced at 1153 , recheck at 1300. Peripheral iv removed.

## 2023-02-22 NOTE — PROGRESS NOTES
Unable to get a weight on pt. Pt is a ledy lift and has a wound Vac in place. Unable to lift d/t the wound vac.

## 2023-02-22 NOTE — PROGRESS NOTES
[facility]    PROGRESS NOTE - Hospitalist Service    Assessment and Plan    Principal Problem:    Severe sepsis (H)  Active Problems:    CAD (coronary artery disease)    Hypertension    Hyperlipidemia    H/O: stroke    Hypomagnesemia    DM type 2 w Neuro/Retin/Nephr -- hgb A1C 7.6 on 1/3/23    Oropharyngeal dysphagia    Cognitive impairment Consistent with Mild Dementia    Wound infection    Sacral wound, initial encounter    Hypokalemia    Hypercalcemia    Anemia due to blood loss, acute    Cristal Preciado is a 72 year old female with h/o sacral decubitus ulcer (present on admission), CVA, dementia, neuropathy, DM2, CKD, CAD and FTT admitted on 2/16/2023 with infected new sacral decubitus ulcer with possible necrotizing fasciitis.      Severe sepsis secondary to Infected Sacral Decubitus ulcer/Possible Necrotizing fasciits,  - Lactic acid= 6.4, WBC= 14.1. Sepsis criteria met with leukocytosis and tachycardia likely source being infected decubitus ulcers right IT and sacrococcygeal decubitus ulcer with lactic acidosis  - 2/17 s/p  I&D by Dr. Ragsdale   - Initially treated with IV Vancomycin, IV Zosyn. ID consulted and stopped clindamycin  - Cultures growing Proteus, E.coli and Enterococcus sensitive to Zosyn  - blood cx NGTD  - WOC for wound assessment  - PICC line in place   - Per ID, plan for 4-6 weeks of IV Zosyn  - Patient has a wound VAC for right IT wound and sacrococcygeal wound    Type II DM  - HgbA1c on 01/03/2023 is 7.6 %  - Patient is refusing to eat and has had numerous hypoglycemic episodes  - Lantus reduced to 5 units subcu every morning  - PTA Victoza and metformin on hold  - She is on D5 LR with 20 mEq of potassium chloride IV fluids  - Continue Accucheck, Insulin sliding scale and hypoglycemic protocol    HTN  -Blood pressure well controlled  -Continue PTA metoprolol     Acute blood loss anemia anemia   - Hgb came back at 5.7 after surgery  - transfused 2 units of packed RBCs  -  "transfuse to keep Hgb > 7  - Hemoglobin has been fairly stable  - Check labs again in a.m.     UTI  - Urinalysis reviewed and consistent with infection.   - on broad spectrum abx   - Ucx mixed urogenital brittani     Hyponatremia- Mild. Resolved. Likely due to hypovolemia and poor oral intake  - Back on IVF  - Na level is now normal  - Monitor BMP     Hypercalcemia- resolved may have been due to dehydration    Hypokalemia  - replacement protocol     Hypomagnesemia  - Replace per protocol    H/o dysphagia and son is not certain of exact diet but he is aware she's on a modified diet, but uncertain of type  - RN D/W SLP and patient was on a Level 4 diet because son was not sure what level diet she was on PTA  - Reviewed Video swallow study done on 10/07/2022  - Okay to advance to an Easy to Chew with thin liquid diet  - Encourage oral intake  - dietitian has ordered a calorie count  - If patient continues to refuse to eat then may need to discuss goals of care and even feeding tube placement    Heel ulcers will have WOC assess    Dementia- Surrogate decision makers are daughter- Alexandra Preciado [phone] and son- Camron Preciado [phone].      Clinically Significant Risk Factors                        # Overweight: Estimated body mass index is 28.31 kg/m  as calculated from the following:    Height as of this encounter: 1.626 m (5' 4\").    Weight as of this encounter: 74.8 kg (164 lb 14.4 oz)., PRESENT ON ADMISSION         COVID-19 PCR Results    COVID-19 PCR Results 1/14/22 1/18/22 1/21/22 1/25/22 1/28/22 2/1/22 2/4/22 2/8/22 2/11/22 2/15/22   COVID-19 Virus by PCR (External Result)             SARS CoV2 PCR Negative  Negative  Negative  Negative Negative Negative Negative   COVID-19 Virus PCR - Result  NOT DETECTED  NOT DETECTED  NOT DETECTED          Comments are available for some flowsheets but are not being displayed.         COVID-19 Antibody Results, Testing for Immunity    COVID-19 Antibody Results, Testing " for Immunity   No data to display.            Code Status: Full Code  VTE prophylaxis:  Pneumatic Compression Devices  DIET: Orders Placed This Encounter      Combination Diet Moderate Consistent Carb (60 g CHO per Meal) Diet; Soft and Bite Sized Diet (level 6); Thin Liquids (level 0)    Drains/Lines: PICC  Weight bearing status: bedrest, per son he does not ambulate   Disposition/Barriers to discharge: pending postop course, and family requesting new LTC, may need to return to same facility while they look for a new placement     Expected Discharge Date:     TBD      Interval History   Pertinent overnight events: None    Subjective:  Cristal is not a reliable historian.  She seems quite disoriented.  Not able to tell me why she does not want to eat.    PHYSICAL EXAM  Temp:  [97.6  F (36.4  C)-98.4  F (36.9  C)] 98.4  F (36.9  C)  Pulse:  [64-74] 65  Resp:  [20] 20  BP: (110-140)/(51-63) 123/52  SpO2:  [99 %-100 %] 100 %  Wt Readings from Last 1 Encounters:   02/22/23 66.8 kg (147 lb 4.3 oz)       Intake/Output Summary (Last 24 hours) at 2/17/2023 0840  Last data filed at 2/17/2023 0646  Gross per 24 hour   Intake 3450 ml   Output 730 ml   Net 2720 ml      Body mass index is 23.07 kg/m .    Physical Exam  Constitutional:       Comments: Chronically ill-appearing NAD   HENT:      Head: Normocephalic.      Mouth/Throat:      Mouth: Mucous membranes are dry.   Cardiovascular:      Rate and Rhythm: Normal rate and regular rhythm.      Pulses: Normal pulses.      Comments: Soft systolic murmur  Pulmonary:      Effort: Pulmonary effort is normal. No respiratory distress.      Breath sounds: Normal breath sounds.   Abdominal:      General: Bowel sounds are normal. There is no distension.      Palpations: Abdomen is soft.      Tenderness: There is no abdominal tenderness.   Musculoskeletal:      Cervical back: Neck supple.      Right lower leg: Edema present.      Left lower leg: Edema present.      Comments: Ulcers left heel  large black eschar. Right heel eschar smaller ulcer  Right hip wound VAC  Dry cracked skin   Skin:     General: Skin is warm and dry.   Neurological:      Mental Status: She is alert. Mental status is at baseline. She is disoriented.       PERTINENT LABS/IMAGING:  Results for orders placed or performed during the hospital encounter of 02/16/23   CT Pelvis Soft Tissue w Contrast   Result Value Ref Range    Radiologist flags Necrotizing fasciitis (AA)     Impression    IMPRESSION:  1.  New sacral decubitus ulcer with subcutaneous emphysema extending into the surrounding soft tissues. This could represent necrotizing fasciitis.  2.  A new right greater trochanter decubitus ulcer.  3.  Cystitis.      [Critical Result: Necrotizing fasciitis]    Finding was identified on 2/16/2023 6:35 PM.     Dr. Singer was contacted by me on 2/16/2023 6:42 PM and verbalized understanding of the critical result.          Recent Labs   Lab 02/22/23  1154 02/22/23  0812 02/22/23  0557 02/21/23  0838 02/21/23  0656 02/20/23  0757 02/20/23  0643 02/19/23  1152 02/19/23  1106 02/18/23  1715 02/18/23  1221 02/18/23  0647 02/18/23  0247 02/17/23  1217 02/17/23  1035 02/16/23  1939 02/16/23  1431   WBC  --   --   --   --   --   --  7.4  --  8.0  --   --   --  10.0  --   --    < > 14.1*   HGB  --   --   --   --   --   --  8.3*  --  7.4*  --  8.8*  --  5.7*  --   --    < > 10.5*   MCV  --   --   --   --   --   --  90  --  89  --   --   --  95  --   --    < > 94   PLT  --   --   --   --   --   --  236  --  209  --   --   --  224  --   --    < > 369   NA  --   --  135*  --   --   --  136  --   --   --   --   --  139  --  136  --  132*   POTASSIUM  --   --  4.3  4.2  --  4.4  --  4.4   < > 3.0*  --   --   --  5.1  5.1   < > 2.8*  --  4.2   CHLORIDE  --   --  105  --   --   --  109*  --   --   --   --   --  109*  --  103  --  93*   CO2  --   --  23  --   --   --  21*  --   --   --   --   --  18*  --  23  --  20*   BUN  --   --  8.0  --   --   --   7.5*  --   --   --   --   --  17.5  --  18.1  --  34.6*   CR  --   --  0.76  --   --   --  0.76  --  0.74  --   --   --  0.86  --  0.75  --  0.93   ANIONGAP  --   --  7  --   --   --  6*  --   --   --   --   --  12  --  10  --  19*   OLAF  --   --  8.0*  --   --   --  7.5*  --   --   --   --   --  7.7*  --  8.5*  --  10.3*   * 139* 107*   < >  --    < > 122*   < >  --    < >  --    < > 215*   < > 142*   < > 440*   ALBUMIN  --   --   --   --   --   --   --   --   --   --   --   --   --   --  1.8*  --  2.9*   PROTTOTAL  --   --   --   --   --   --   --   --   --   --   --   --   --   --  5.7*  --  8.0   BILITOTAL  --   --   --   --   --   --   --   --   --   --   --   --   --   --  0.6  --  0.8   ALKPHOS  --   --   --   --   --   --   --   --   --   --   --   --   --   --  184*  --  318*   ALT  --   --   --   --   --   --   --   --   --   --   --   --   --   --  22  --  36*   AST  --   --   --   --   --   --   --   --   --   --   --   --   --   --  35  --  54*    < > = values in this interval not displayed.     Recent Labs   Lab Test 11/18/21  0544   CHOL 92   HDL 26*   LDL 30   TRIG 180*     Recent Labs   Lab Test 02/17/23  0752 02/16/23  1939 02/16/23  1431   NA  --   --  132*   POTASSIUM  --   --  4.2   CHLORIDE  --   --  93*   CO2  --   --  20*   *   < > 440*   BUN  --   --  34.6*   CR  --   --  0.93   GFRESTIMATED  --   --  66   OLAF  --   --  10.3*    < > = values in this interval not displayed.     Recent Labs   Lab Test 01/03/23  2250 08/17/22  0532 06/17/21  0553   A1C 7.6* 6.9* 8.1*     Recent Labs   Lab Test 02/17/23  0630 02/16/23  1431 02/03/23  0602   HGB 8.0* 10.5* 11.6*     Recent Labs   Lab Test 04/24/18  1021 04/24/18  0348 04/24/18  0006   TROPONINI <0.01 <0.01 <0.01     Recent Labs   Lab Test 07/23/19  1158   NTBNP 54     Recent Labs   Lab Test 02/03/23  0602   TSH 0.82     No results for input(s): INR in the last [id number] hours.    Reta Frias MD

## 2023-02-22 NOTE — PROGRESS NOTES
[facility] Nurse Inpatient Assessment     Consulted for: Buttocks, heels    Patient History (according to provider note(s):      Cristal Preciado is a 72 year old female with PMH significant for known sacral decubitus ulcer (present on admission), CVA, dementia, neuropathy, DM2, CKD, CAD and FTT admitted on 2/16/2023 with infected new sacral decubitus ulcer with possible necrotizing fasciitis.      1. Sepsis- Admit patient. Infected Sacral Decubitus ulcer/Possible Necrotizing fasciits,UTI,Lactic acid= 6.4, WBC= 14.1. CT pelvis report reviewed. ED physician has already been in contact with General Surgeon on call and patient will be seen in consultation. Continue Vancomycin, Zosyn and Clindamycin. Continue IV fluids. Currently NPO. Repeat labs in a.m. Monitor vitals closely. Will make further recommendations based on clinical course.     2. Possible Necrotizing Fasciitis- ED has contacted General Surgeon on call and patient will be seen in consultation. Continue Vancomycin, Zosyn and Clindamycin. Plan per surgeon.    Areas Assessed:      Pressure Injury Location: Sacrum      2/20 2/22    Wound type: Pressure Injury     Pressure Injury Stage: 4, present on admission   Wound history/plan of care:   Patient admitted with worsening pressure wounds, was taken for an I&D    Wound base: 30 % slough and bone, 70 % dermis, non-granular tissue and adipose tissue     Palpation of the wound bed: boggy      Drainage: scant     Description of drainage: serosanguinous     Measurements (length x width x depth, in cm) 10  x 12  x  3.5 cm      Tunneling N/A     Undermining up to 1.5 cm from 8-11 o'clock  Periwound skin: Intact      Color: normal and consistent with surrounding tissue      Temperature: normal   Odor: none  Pain: moderate, with palpation and during dressing change  Pain intervention prior to dressing change: slow and gentle  cares   Treatment goal: Heal , Increase granulation and Protection  STATUS: evolving  Supplies ordered: gathered    My PI Risk Assessment     Sensory Perception: 2 - Very Limited     Moisture: 1 - Constantly moist     Activity: 1 - Bedfast      Mobility: 2 - Very limited     Nutrition: 2 - Probably inadequate      Friction/Shear: 1 - Problem     TOTAL: 9  __________________________________________________________________________________________________________________  [facility]     2/20 2/22    Wound type: Pressure Injury     Pressure Injury Stage: 4, present on admission   Wound history/plan of care:   See above    Wound base: 30 % slough and bone, 70 % non-granular tissue and adipose tissue     Palpation of the wound bed: normal      Drainage: scant     Description of drainage: serosanguinous     Measurements (length x width x depth, in cm) 12  x 10.5  x  3 cm      Tunneling N/A     Undermining N/A  Periwound skin: Intact      Color: normal and consistent with surrounding tissue      Temperature: normal   Odor: none  Pain: mild,   Pain intervention prior to dressing change: slow and gentle cares   Treatment goal: Heal , Increase granulation and Protection  STATUS: evolving  Supplies ordered: gathered  ________________________________________________________________________________________________________________    Negative pressure wound therapy applied to: Sacrum & KIMBERLY fox   Last photo: see above  Wound due to: Pressure Injury   Wound history/plan of care:      Surgical date: 2/18/23     Date Negative Pressure Wound Therapy initiated: 2/20/23     Interventions in place: repositioning, pressure redistribution with device or dressing, specialty surface in use, moisture/incontinence management and offloading    Is patient s nutritional status compromised? yes   a. If yes, what interventions are in place? Protein  supplements    Reason for initiating vac therapy? Presence of co-morbidities, High risk of infections, Need for accelerated granulation tissue and Prior history of delayed wound healing    Which?of?the?following?co-morbidities?apply? Immobility  a. If diabetic is patient on a diabetic management program? N/A     Is osteomyelitis present in wound? Y  a.  If yes what treatments are in place? N/A    Number of foam pieces removed from a wound (excluding foam for bridge) : initial placement   Verified this matched the number of foam pieces applied last dressing change: N/A   Number of foam pieces packed into wound (excluding foam for bridge) : 4 CleanseChoice Foam. To sacral wound: 20cc Vashe, 5 minute soak, every 4 hours, -125mmHg.   _____________________________________________________________________________________________________________________________________________________________________    Pressure Injury Location: Bilateral heels    2/20 L heel      2/20 R heel    Wound type: Pressure Injury     Pressure Injury Stage: Unstageable, present on admission      Wound history/plan of care:   See above    Wound base: 100 % eschar     Palpation of the wound bed: firm      Drainage: none     Description of drainage: none     Measurements (length x width x depth, in cm)   L heel: 4  x 5.5 cm    R heel: 2  x 2 cm      Tunneling N/A     Undermining N/A  Periwound skin: Intact and Scar tissue      Color: pink      Temperature: normal   Odor: none  Pain: absent, none  Pain intervention prior to dressing change: slow and gentle cares   Treatment goal: Maintain (prevention of deterioration) and offload pressure, reduce friction and shear  STATUS: initial assessment  Supplies ordered: supplies stored on unit - no change in care needed      Treatment Plan:     Negative pressure wound therapy plan:  Wound location: Sacrum + R Trocanter    Change Days: Mon/Wed/Fri by WOC RN    Supplies (including all accessories) used: medium  "Veraflo cleanse choice gray foam, Adapt barrier ring and Cavilon no sting barrier film  Cleanse with Vashe prior to replacing VAC    Suction setting: -125   Methods used: Window paned all periwound skin with vac drape prior to applying sponge and barrier rings around entire sacral wound for veriflo     Staff RN to assess integrity of dressing and ensure suction is set at appropriate level every shift.   Date canister. Chart canister output every shift. Change cannister weekly and PRN if full/occluded.  If VASHE bottle for instillation runs dry, spike a new bottle by removing the clear cap and spiking through the white cap. Ensure blue filter on the spike port is open.     If the dressing is leaking or machine alarms blockage, stop therapy. Select therapy settings and select VAC therapy, push start. Therapy will begin with suction only. If this does not correct the problems then...    Remove foam dressing and replace with BID normal saline moist gauze dressing if:   -a dressing failure which cannot be repaired within 2 hours   -patient is discharging to home without a home pump   -patient is discharging to a facility outside the local area   -if a dressing is a \"Silver Foam\", remove before Radiation Therapy or MRI     The hospital VAC pump is not to be discharged with the patient.?Ensure to disconnect patient from machine prior to discharge. Then,    - If a home KCI VAC pump has been delivered, connect home cannister to dressing tubing then connect cannister to home pump and turn on machine    - If transferring to a nearby facility with a KCI vac, can disconnect and clamp tubing then cover end with glove so can be reconnected within 2 hours      Heels  1. Paint with betadine daily, allow to dry  2. Offload heels at all times while in bed, utilize prevlon boots    RECOMMEND PRIMARY TEAM ORDER: None, at this time  Education provided: importance of repositioning, wound progress, Infection prevention , Moisture " management and Off-loading pressure  Discussed plan of care with: Patient, Family, Nurse and Physician  [facility] nurse follow-up plan: Monday/WednesdayFriday  Notify WOC if wound(s) deteriorate.  Nursing to notify the Provider(s) and re-consult the WOC Nurse if new skin concern.    DATA:     Current support surface: Standard  Standard gel/foam mattress (IsoFlex, Atmos air, etc)  Containment of urine/stool: Incontinence Protocol  BMI: Body mass index is 23.07 kg/m .   Active diet order: Orders Placed This Encounter      Combination Diet Moderate Consistent Carb (60 g CHO per Meal) Diet; Soft and Bite Sized Diet (level 6); Thin Liquids (level 0)     Output: I/O last 3 completed shifts:  In: 1095 [P.O.:120; I.V.:975]  Out: 2400 [Urine:500; Drains:1900]     Labs:   Recent Labs   Lab 02/20/23  0643 02/18/23  0247 02/17/23  1035   ALBUMIN  --   --  1.8*   HGB 8.3*   < >  --    WBC 7.4   < >  --     < > = values in this interval not displayed.     Pressure injury risk assessment:   Sensory Perception: 2-->very limited  Moisture: 3-->occasionally moist  Activity: 1-->bedfast  Mobility: 1-->completely immobile  Nutrition: 3-->adequate  Friction and Shear: 2-->potential problem  Lalit Score: 12    MUNIRA AMBRIZ RN CWOCN  Pager no longer is use, please contact through [facility] [employer] group: [facility] Nurse

## 2023-02-22 NOTE — PROGRESS NOTES
Care Management Follow Up    Length of Stay (days): 5    Expected Discharge Date: 02/22/2023     Concerns to be Addressed: discharge planning       Patient plan of care discussed at interdisciplinary rounds: Yes    Anticipated Discharge Disposition: Long Term Care     Anticipated Discharge Services: Other (see comment) (nursing care services, assistance with ADLs, care and supervision)    Anticipated Discharge DME: None    Patient/family educated on Medicare website which has current facility and service quality ratings: yes    Education Provided on the Discharge Plan:  Yes    Patient/Family in Agreement with the Plan: yes    Referrals Placed by CM/ERIKA: Post Acute Facilities    Private pay costs discussed: Not applicable    Additional Information: ERIKA left message for Trevin at [facility] inquiring as to if pt has bed hold and requesting return call.  ERIKA tried to reach pt's daughter Alexandra.  ERIKA called number for her listed as mobile phone - rang and rang, no answer, no voicemail box.  ERIKA also called number for her listed as home line - ERIKA received message that voicemail box is full and cannot accept messages at this time.      ERIKA received call back from Trevin at [facility].  She reported to ERIKA that she has been unable to get ahold of pt's daughter to confirm a bed hold.   However, they are keeping room for pt and pt may have an automatic eighteen day MA bed hold.  ERIKA updated her regarding also not being able to reach pt's daughter.  SW to keep Trevin updated.      Per charge nurse, pt does not have two wound vacs.  Pt has two wounds that are y-connected to one wound vac machine.          DAPHNEY Stevenson, CHRISTIN 02/22/23 7:57 AM

## 2023-02-22 NOTE — PROGRESS NOTES
Clinical Nutrition Services - Brief Note to Calorie Counts    Writer checked, no tickets placed in the envelope at this time. Writer/RD team will continue to monitor daily.    Ira Smith) Thee, Dietetic Intern

## 2023-02-23 NOTE — PROGRESS NOTES
Clinical Nutrition Services - Brief Note (recommendation) + Calorie Counts    Recommendation to MDs/Providers:  - Given pt has been hospitalized for 7 days with minimal or no po intake (see below) to meet her estimated nutrition needs & increased needs for wound healing. If pt and/or pt's family is in agreement, RD team recommends to initiate nutrition support (tube feeding). If nutrition support is needed/apporpriate, nutrition service is available when consult.    Consider appetite stimulant medication if appropriate      Calorie Counts 2/22:                                                Approximate Oral Intake for:      Calories: no ticket found 0 g consumed  Protein: no ticket found 0 g consumed      Intake from TF/PN:  None      Estimated Needs:    Calories: 0972-9395 kcals/day   Protein: 79-99 grams protein/day        Summary:     No ticket found + per flowsheet pt continues to have minimal food consumptions.  - Per nurse note, 120 mL apple juice given on 21st. If finished, pt still have minimal to no oral intake.  - 2/22 per nurse's note, only few bite of apple sauce with each pill was accepted by pt     Ira Kingston (Amber), Dietetic Intern

## 2023-02-23 NOTE — PLAN OF CARE
Problem: Plan of Care - These are the overarching goals to be used throughout the patient stay.    Goal: Plan of Care Review  Description: The Plan of Care Review/Shift note should be completed every shift.  The Outcome Evaluation is a brief statement about your assessment that the patient is improving, declining, or no change.  This information will be displayed automatically on your shift note.  Outcome: Progressing   Goal Outcome Evaluation:         Pt being repositioned every few hours. Pt still not taking in much by mouth. Ppt has wound vac in place. WOC was here and stopped the infusion of Vache through the vac.

## 2023-02-23 NOTE — PROGRESS NOTES
[facility] Nurse Inpatient Assessment     Consulted for: Buttocks, heels    Was notified by RN that each time the Veraflo instills, the pump stops working and reports that the tubing is kinked. Visible tubing assessed multiple times. Cassette removed and placed back in pump again, all areas are open and flowing without any sign of issues with tubing. Bottle of Vashe is full. Negative pressure setting working correctly on pump. Changed pump to VAC only without any instillation. Will assess with next dressing change tomorrow. See below for previous assessment.     Patient History (according to provider note(s):      Cristal Preciado is a 72 year old female with PMH significant for known sacral decubitus ulcer (present on admission), CVA, dementia, neuropathy, DM2, CKD, CAD and FTT admitted on 2/16/2023 with infected new sacral decubitus ulcer with possible necrotizing fasciitis.      1. Sepsis- Admit patient. Infected Sacral Decubitus ulcer/Possible Necrotizing fasciits,UTI,Lactic acid= 6.4, WBC= 14.1. CT pelvis report reviewed. ED physician has already been in contact with General Surgeon on call and patient will be seen in consultation. Continue Vancomycin, Zosyn and Clindamycin. Continue IV fluids. Currently NPO. Repeat labs in a.m. Monitor vitals closely. Will make further recommendations based on clinical course.     2. Possible Necrotizing Fasciitis- ED has contacted General Surgeon on call and patient will be seen in consultation. Continue Vancomycin, Zosyn and Clindamycin. Plan per surgeon.    Areas Assessed:      Pressure Injury Location: Sacrum      2/20 2/22    Wound type: Pressure Injury     Pressure Injury Stage: 4, present on admission   Wound history/plan of care:   Patient admitted with worsening pressure wounds, was taken for an I&D    Wound base: 30 % slough and bone, 70 % dermis, non-granular tissue and  adipose tissue     Palpation of the wound bed: boggy      Drainage: scant     Description of drainage: serosanguinous     Measurements (length x width x depth, in cm) 10  x 12  x  3.5 cm      Tunneling N/A     Undermining up to 1.5 cm from 8-11 o'clock  Periwound skin: Intact      Color: normal and consistent with surrounding tissue      Temperature: normal   Odor: none  Pain: moderate, with palpation and during dressing change  Pain intervention prior to dressing change: slow and gentle cares   Treatment goal: Heal , Increase granulation and Protection  STATUS: evolving  Supplies ordered: gathered    My PI Risk Assessment     Sensory Perception: 2 - Very Limited     Moisture: 1 - Constantly moist     Activity: 1 - Bedfast      Mobility: 2 - Very limited     Nutrition: 2 - Probably inadequate      Friction/Shear: 1 - Problem     TOTAL: 9  __________________________________________________________________________________________________________________  Pressure Injury Location: [facility]     2/20 2/22    Wound type: Pressure Injury     Pressure Injury Stage: 4, present on admission   Wound history/plan of care:   See above    Wound base: 30 % slough and bone, 70 % non-granular tissue and adipose tissue     Palpation of the wound bed: normal      Drainage: scant     Description of drainage: serosanguinous     Measurements (length x width x depth, in cm) 12  x 10.5  x  3 cm      Tunneling N/A     Undermining N/A  Periwound skin: Intact      Color: normal and consistent with surrounding tissue      Temperature: normal   Odor: none  Pain: mild,   Pain intervention prior to dressing change: slow and gentle cares   Treatment goal: Heal , Increase granulation and Protection  STATUS: evolving  Supplies ordered: gathered  ________________________________________________________________________________________________________________    Negative pressure  wound therapy applied to: Sacrum & R trocanter   Last photo: see above  Wound due to: Pressure Injury   Wound history/plan of care:      Surgical date: 2/18/23     Date Negative Pressure Wound Therapy initiated: 2/20/23     Interventions in place: repositioning, pressure redistribution with device or dressing, specialty surface in use, moisture/incontinence management and offloading    Is patient s nutritional status compromised? yes   a. If yes, what interventions are in place? Protein supplements    Reason for initiating vac therapy? Presence of co-morbidities, High risk of infections, Need for accelerated granulation tissue and Prior history of delayed wound healing    Which?of?the?following?co-morbidities?apply? Immobility  a. If diabetic is patient on a diabetic management program? N/A     Is osteomyelitis present in wound? Y  a.  If yes what treatments are in place? N/A    Number of foam pieces removed from a wound (excluding foam for bridge) : initial placement   Verified this matched the number of foam pieces applied last dressing change: N/A   Number of foam pieces packed into wound (excluding foam for bridge) : 4 CleanseChoice Foam. To sacral wound: 20cc Vashe, 5 minute soak, every 4 hours, -125mmHg.   _____________________________________________________________________________________________________________________________________________________________________    Pressure Injury Location: Bilateral heels    2/20 L heel      2/20 R heel    Wound type: Pressure Injury     Pressure Injury Stage: Unstageable, present on admission      Wound history/plan of care:   See above    Wound base: 100 % eschar     Palpation of the wound bed: firm      Drainage: none     Description of drainage: none     Measurements (length x width x depth, in cm)   L heel: 4  x 5.5 cm    R heel: 2  x 2 cm      Tunneling N/A     Undermining N/A  Periwound skin: Intact and Scar tissue      Color: pink      Temperature: normal  "  Odor: none  Pain: absent, none  Pain intervention prior to dressing change: slow and gentle cares   Treatment goal: Maintain (prevention of deterioration) and offload pressure, reduce friction and shear  STATUS: initial assessment  Supplies ordered: supplies stored on unit - no change in care needed      Treatment Plan:     Negative pressure wound therapy plan:  Wound location: Sacrum + R Trocanter    Change Days: Mon/Wed/Fri by [facility] RN    Supplies (including all accessories) used: medium Veraflo cleanse choice gray foam, Adapt barrier ring and Cavilon no sting barrier film  Cleanse with Vashe prior to replacing VAC    Suction setting: -125   Methods used: Window paned all periwound skin with vac drape prior to applying sponge and barrier rings around entire sacral wound for veriflo     Staff RN to assess integrity of dressing and ensure suction is set at appropriate level every shift.   Date canister. Chart canister output every shift. Change cannister weekly and PRN if full/occluded.  If VASHE bottle for instillation runs dry, spike a new bottle by removing the clear cap and spiking through the white cap. Ensure blue filter on the spike port is open.     If the dressing is leaking or machine alarms blockage, stop therapy. Select therapy settings and select VAC therapy, push start. Therapy will begin with suction only. If this does not correct the problems then...    Remove foam dressing and replace with BID normal saline moist gauze dressing if:   -a dressing failure which cannot be repaired within 2 hours   -patient is discharging to home without a home pump   -patient is discharging to a facility outside the local area   -if a dressing is a \"Silver Foam\", remove before Radiation Therapy or MRI     The hospital VAC pump is not to be discharged with the patient.?Ensure to disconnect patient from machine prior to discharge. Then,    - If a home KCI VAC pump has been delivered, connect home cannister to dressing " tubing then connect cannister to home pump and turn on machine    - If transferring to a nearby facility with a KCI vac, can disconnect and clamp tubing then cover end with glove so can be reconnected within 2 hours      Heels  1. Paint with betadine daily, allow to dry  2. Offload heels at all times while in bed, utilize prevlon boots    RECOMMEND PRIMARY TEAM ORDER: None, at this time  Education provided: importance of repositioning, wound progress, Infection prevention , Moisture management and Off-loading pressure  Discussed plan of care with: Patient, Family, Nurse and Physician  [facility] nurse follow-up plan: Monday/WednesdayFriday  Notify [facility] if wound(s) deteriorate.  Nursing to notify the Provider(s) and re-consult the [facility] Nurse if new skin concern.    DATA:     Current support surface: Standard  Standard gel/foam mattress (IsoFlex, Atmos air, etc)  Containment of urine/stool: Incontinence Protocol  BMI: Body mass index is 23.07 kg/m .   Active diet order: Orders Placed This Encounter      Combination Diet Moderate Consistent Carb (60 g CHO per Meal) Diet; Soft and Bite Sized Diet (level 6); Thin Liquids (level 0)     Output: I/O last 3 completed shifts:  In: 0   Out: 1600 [Urine:1300; Drains:300]     Labs:   Recent Labs   Lab 02/23/23  0722 02/23/23  0523   ALBUMIN 0.9*  --    HGB  --  8.4*   WBC  --  6.2     Pressure injury risk assessment:   Sensory Perception: 2-->very limited  Moisture: 3-->occasionally moist  Activity: 1-->bedfast  Mobility: 1-->completely immobile  Nutrition: 3-->adequate  Friction and Shear: 2-->potential problem  Lalit Score: 12    Libertad Marques RN, CWOCN, CFCN  Pager no longer is use, please contact through [employer] group: [facility] Nurse

## 2023-02-23 NOTE — PROGRESS NOTES
"Writer entered the pt room to administer 2.5 Oxycodone PO, after administration of oxycodone, writer asked if the pt or visitor needed anything with no response writer walked into the opening of the doorway. At this time, pt called out for help being pulled further to her side. When writer was assessing the pt needs and educating about the need to turn in order to promote wound healing, as well as the likelihood of needing assistance to reposition to not harm staff during repositioning. The visitor stood from the recliner in a hurry and came around the pt bed. As the visitor turned from the foot of the bed towards the writer, his arms were cocked above hip level with two fists. At this time he was mumbling under his breath \"Ill just do it.Yyou better back the fuck up, before I knock you out.\". The writer then apologized and reiterated that staff need assistance with turns as the writer moved towards the foot of the bed. The visitor continued to maintain eyecontact with a fist clenched and the other hand pointing at the writer, \"that's my mom and you're not going to touch her, so stay out of her room\". At this time the writer moved to the door way and out of the room before contacting the unit manager Lesli.   "

## 2023-02-23 NOTE — PROGRESS NOTES
"Care Management Follow Up    Length of Stay (days): 7    Expected Discharge Date: 02/23/2023     Concerns to be Addressed: discharge planning, needs wound vac, and   4 to 6 weeks of IV abx   Patient plan of care discussed at interdisciplinary rounds: Yes    Anticipated Discharge Disposition: Long Term Care     Anticipated Discharge Services: Other (see comment) (nursing care services, assistance with ADLs, care and supervision)  Anticipated Discharge DME: None    Patient/family educated on Medicare website which has current facility and service quality ratings: yes  Education Provided on the Discharge Plan:    Patient/Family in Agreement with the Plan: yes    Referrals Placed by CM/SW: Post Acute Facilities  Private pay costs discussed: Not applicable    Additional Information:  Chart reviewed.    Background:  From [facility]. Family does not want pt to return to [facility]- want her to go to [facility]. Pt will need to return to [facility] and work on different placement as outpatient. Will need IV Abx and possibly wound vac. Transport TBD . Vamsi Lyon and dtr Alexandra involved    CM updates:  Per old CM note:   \"SW received call back from Trevin at [facility].  She reported to  that she has been unable to get ahold of pt's daughter to confirm a bed hold.   However, they are keeping room for pt and pt may have an automatic eighteen day MA bed hold.  SW updated her regarding also not being able to reach pt's daughter.  SW to keep Trevin updated.\"      Pt is needed 4 to 6 weeks of IV abx, and Y Site wound vac. RNCM called and left VM at [facility] to see if pt can return with this. Awaiting call back.    ID following.       RNCM attempted to call vamsi Lyon, writer spoke to him about pt would need to return to [facility], and have assistance from their SWer in order to coordinate new LTC placement, vamsi Lyon stated, \"she is not going back there, they already said it.\" Writer asked who is they? Camron hung up on writer. Writer " tried to call back and it went straight to . Writer left  with contact info to call writer back.     RNCM also called daughter Alexandra both numbers, and phone just keeps ringing. The one phone the mailbox is full.     RNCM spoke with pts daughter Alexandra. Alexandra is saying that they filed police reports against [facility], and will not have their mom going back there. She stated an ID doctor on Saturday told daughter that CM was looking for new LTC placement.       RNCM reached out to supervisor to see how to  Proceed. She said to get ahold of Ric and see where the facility is at with placing this pt somewhere else. Awaiting call back from Bhargav.       CM will continue to follow plan of care, review recommendations, and assist with any discharge needs anticipated.     Jessica Wilkerson RN

## 2023-02-23 NOTE — PLAN OF CARE
Problem: Pain Acute  Goal: Optimal Pain Control and Function  Outcome: Progressing  Intervention: Prevent or Manage Pain  Recent Flowsheet Documentation  Taken 2/22/2023 1700 by Jean-Claude Toledo RN  Sensory Stimulation Regulation: quiet environment promoted  Medication Review/Management: medications reviewed     Problem: Diabetes Comorbidity  Goal: Blood Glucose Level Within Targeted Range  Outcome: Progressing     Problem: Risk for Delirium  Goal: Improved Sleep  Outcome: Adequate for Care Transition     Problem: Hypertension Acute  Goal: Blood Pressure Within Desired Range  Outcome: Adequate for Care Transition  Intervention: Normalize Blood Pressure  Recent Flowsheet Documentation  Taken 2/22/2023 1700 by Jean-Claude Toledo RN  Sensory Stimulation Regulation: quiet environment promoted  Medication Review/Management: medications reviewed     Problem: Plan of Care - These are the overarching goals to be used throughout the patient stay.    Goal: Absence of Hospital-Acquired Illness or Injury  Intervention: Identify and Manage Fall Risk  Recent Flowsheet Documentation  Taken 2/22/2023 1700 by Jean-Claude Toledo RN  Safety Promotion/Fall Prevention: bed alarm on  Intervention: Prevent Skin Injury  Recent Flowsheet Documentation  Taken 2/22/2023 1917 by Jean-Claude Toeldo RN  Body Position:   turned   left  Taken 2/22/2023 1700 by Jean-Claude Toledo RN  Body Position: (Turned q2h on shedule) turned     Problem: Risk for Delirium  Goal: Improved Behavioral Control  Intervention: Minimize Safety Risk  Recent Flowsheet Documentation  Taken 2/22/2023 1700 by Jean-Claude Toledo RN  Enhanced Safety Measures: bed alarm set  Goal: Improved Attention and Thought Clarity  Intervention: Maximize Cognitive Function  Recent Flowsheet Documentation  Taken 2/22/2023 1700 by Jean-Claude Toledo RN  Sensory Stimulation Regulation: quiet environment promoted  Reorientation Measures: clock in view     Problem: Violence Risk or Actual  Goal: Anger  and Impulse Control  Intervention: Minimize Safety Risk  Recent Flowsheet Documentation  Taken 2/22/2023 1700 by Jean-Claude Toledo, RN  Sensory Stimulation Regulation: quiet environment promoted  Enhanced Safety Measures: bed alarm set   Goal Outcome Evaluation:       Patient alert and confused of situation and time per baseline. VSS. Patient did have c/o high amounts of pain for which acetaminophen given. Wound vac had error message that instillation port plugged. Appears to be fixed by releasing the cartridge then reinstalling it.     Patient repositioned every two hours. Incontinent of bowel 1x this shift. Patient's son in room. He did make threatening statement toward nursing assistant staff. Charge nurse notified.     Patient refused voltaren and eye drops.

## 2023-02-23 NOTE — PROGRESS NOTES
[facility]    PROGRESS NOTE - Hospitalist Service    Assessment and Plan    Principal Problem:    Severe sepsis (H)  Active Problems:    CAD (coronary artery disease)    Hypertension    Hyperlipidemia    H/O: stroke    Hypomagnesemia    DM type 2 w Neuro/Retin/Nephr -- hgb A1C 7.6 on 1/3/23    Oropharyngeal dysphagia    Cognitive impairment Consistent with Mild Dementia    Wound infection    Sacral wound, initial encounter    Hypokalemia    Hypercalcemia    Anemia due to blood loss, acute    Cristal Preciado is a 72 year old female with h/o sacral decubitus ulcer (present on admission), CVA, dementia, neuropathy, DM2, CKD, CAD, failure to thrive admitted on 2/16/2023 with infected new sacral decubitus ulcer with possible necrotizing fasciitis.      Severe sepsis secondary to Infected Sacral Decubitus ulcer/Possible Necrotizing fasciits,  - Lactic acid= 6.4, WBC= 14.1. Sepsis criteria met with leukocytosis and tachycardia likely source being infected decubitus ulcers right IT and sacrococcygeal decubitus ulcer with lactic acidosis  - 2/17 s/p  I&D by Dr. Ragsdale   - Initially treated with IV Vancomycin, IV Zosyn. ID consulted and stopped clindamycin  - Cultures growing Proteus, E.coli and Enterococcus sensitive to Zosyn  - blood cx NGTD  - WOC for wound assessment  - PICC line in right arm  - Per ID, plan for 4-6 weeks of IV Zosyn  - Patient has a wound VAC for right IT wound and sacrococcygeal wound    Malnutrition  -Patient with extremely poor oral intake for the last 7 days  -She has taken a few bites of applesauce and maybe some juice at times but requires a lot of coaxing  -Patient presented with stage IV decubitus ulcers and after debridement are being treated with a wound VAC  -Requested palliative care consult to address goals of care with patient's family.  Patient is not really able to make complex medical decisions at this time.  She does not have a healthcare directive.  Her POLST from  2013 does say that she would be agreeable to tube feeds.  -It would be reasonable to have IR place Dobbhoff feeding tube until family is able to make a decision regarding feeding tube placement. (Concern that patient will either not allow placement versus dislodging it)  -Attempted to call patient's daughter Court as well as patient's son Camron and daughter's phone kept ringing and son's phone went straight to voicemail.  Left a message saying who I was but not any details and requested that he call in the morning.    Malnutrition:   - Level of malnutrition: Moderate   % Weight Loss:  Unable to assess wt since hospital stay due to no new wt (will order a new wt)  % Intake:  </= 50% for >/= 5 days (severe malnutrition) - unable to determine the exact date; pt has been eating little or none since hospital. Writer suspected to have longer decreased food intake period PTA per conversation  Subcutaneous Fat Loss:  None observed - only can observe face  Muscle Loss:  Unable to observe - pt got frustrated with questions, not appropriate time to evaluate  Fluid Retention:  Mild: left arm & right hand; trace: generalized edema    Type II DM  - HgbA1c on 01/03/2023 is 7.6 %  - Patient is refusing to eat and has had numerous hypoglycemic episodes  - Lantus reduced to 5 units subcu every morning and patient started on D5 LR with 20 mEq of potassium chloride IV fluids  - PTA Victoza and metformin on hold  - Continue Accucheck, Insulin sliding scale and hypoglycemic protocol    HTN  -Blood pressure well controlled  -Continue PTA metoprolol     Acute blood loss anemia anemia   - Hgb came back at 5.7 after surgery  - transfused 2 units of packed RBCs  - transfuse to keep Hgb > 7  - Hemoglobin has been fairly stable  - Check labs again in a.m.     UTI  - Urinalysis reviewed and consistent with infection.   - on broad spectrum abx   -Urine culture grew mixed urogenital brittani     Hyponatremia- Mild. Resolved. Likely due to  "hypovolemia and poor oral intake  - Back on IVF  - Na level is now normal  - Monitor BMP     Hypercalcemia- resolved may have been due to dehydration    Hypokalemia  - replacement protocol     Hypomagnesemia  - Replace per protocol    H/o dysphagia and son is not certain of exact diet but he is aware she's on a modified diet, but uncertain of type  - RN D/W SLP and patient was on a Level 4 diet because son was not sure what level diet she was on PTA  - Reviewed Video swallow study done on 10/07/2022  - Okay to advance to a level 6 with thin liquid diet  - Encourage oral intake  - dietitian has ordered a calorie count    Heel ulcers assessed by wound nurse    Dementia- Surrogate decision makers are daughter- Alexandra Preciado [phone] and son- Camron Preciado [phone].      Clinically Significant Risk Factors                        # Overweight: Estimated body mass index is 28.31 kg/m  as calculated from the following:    Height as of this encounter: 1.626 m (5' 4\").    Weight as of this encounter: 74.8 kg (164 lb 14.4 oz)., PRESENT ON ADMISSION         COVID-19 PCR Results    COVID-19 PCR Results 1/14/22 1/18/22 1/21/22 1/25/22 1/28/22 2/1/22 2/4/22 2/8/22 2/11/22 2/15/22   COVID-19 Virus by PCR (External Result)             SARS CoV2 PCR Negative  Negative  Negative  Negative Negative Negative Negative   COVID-19 Virus PCR - Result  NOT DETECTED  NOT DETECTED  NOT DETECTED          Comments are available for some flowsheets but are not being displayed.         COVID-19 Antibody Results, Testing for Immunity    COVID-19 Antibody Results, Testing for Immunity   No data to display.            Code Status: Full Code  VTE prophylaxis:  Pneumatic Compression Devices  DIET: Orders Placed This Encounter      Combination Diet Moderate Consistent Carb (60 g CHO per Meal) Diet; Soft and Bite Sized Diet (level 6); Thin Liquids (level 0)    Drains/Lines: PICC  Weight bearing status: bedrest, per son she does not ambulate "   Disposition/Barriers to discharge: Need to address nutritional needs.  Palliative care consult.    Expected Discharge Date:     TBD      Interval History   Pertinent overnight events: None    Subjective:  Cristla is not a reliable historian.  Not very talkative.  Did not offer any complaints.  She has not been eating at all.  Not oriented.    PHYSICAL EXAM  Temp:  [97.8  F (36.6  C)-99.2  F (37.3  C)] 99.2  F (37.3  C)  Pulse:  [64-70] 65  Resp:  [18] 18  BP: (123-130)/(58-62) 127/59  SpO2:  [96 %-100 %] 99 %  Wt Readings from Last 1 Encounters:   02/22/23 66.8 kg (147 lb 4.3 oz)       Intake/Output Summary (Last 24 hours) at 2/17/2023 0840  Last data filed at 2/17/2023 0646  Gross per 24 hour   Intake 3450 ml   Output 730 ml   Net 2720 ml      Body mass index is 23.07 kg/m .    Physical Exam  Constitutional:       Comments: Chronically ill-appearing NAD   HENT:      Head: Normocephalic.      Mouth/Throat:      Mouth: Mucous membranes are dry.   Cardiovascular:      Rate and Rhythm: Normal rate and regular rhythm.      Pulses: Normal pulses.   Pulmonary:      Effort: Pulmonary effort is normal. No respiratory distress.      Breath sounds: Normal breath sounds.   Abdominal:      General: Bowel sounds are normal. There is no distension.      Palpations: Abdomen is soft.      Tenderness: There is no abdominal tenderness.   Musculoskeletal:      Cervical back: Neck supple.      Right lower leg: Edema present.      Left lower leg: Edema present.      Comments: Ulcers left heel large black eschar. Right heel eschar smaller ulcer  Right hip wound VAC  Dry cracked skin   Skin:     General: Skin is warm and dry.   Neurological:      Mental Status: She is alert. Mental status is at baseline. She is disoriented.       PERTINENT LABS/IMAGING:  Results for orders placed or performed during the hospital encounter of 02/16/23   CT Pelvis Soft Tissue w Contrast   Result Value Ref Range    Radiologist flags Necrotizing fasciitis (AA)      Impression    IMPRESSION:  1.  New sacral decubitus ulcer with subcutaneous emphysema extending into the surrounding soft tissues. This could represent necrotizing fasciitis.  2.  A new right greater trochanter decubitus ulcer.  3.  Cystitis.      [Critical Result: Necrotizing fasciitis]    Finding was identified on 2/16/2023 6:35 PM.     Dr. Singer was contacted by me on 2/16/2023 6:42 PM and verbalized understanding of the critical result.          Recent Labs   Lab 02/23/23  1639 02/23/23  1224 02/23/23  0820 02/23/23  0722 02/23/23  0523 02/22/23  0812 02/22/23  0557 02/21/23  0838 02/21/23  0656 02/20/23  0757 02/20/23  0643 02/19/23  1152 02/19/23  1106 02/18/23  0647 02/18/23  0247 02/17/23  1217 02/17/23  1035   WBC  --   --   --   --  6.2  --   --   --   --   --  7.4  --  8.0  --  10.0  --   --    HGB  --   --   --   --  8.4*  --   --   --   --   --  8.3*  --  7.4*   < > 5.7*  --   --    MCV  --   --   --   --  93  --   --   --   --   --  90  --  89  --  95  --   --    PLT  --   --   --   --  230  --   --   --   --   --  236  --  209  --  224  --   --    NA  --   --   --   --   --   --  135*  --   --   --  136  --   --   --  139  --  136   POTASSIUM  --   --   --  4.4  --   --  4.3  4.2  --  4.4  --  4.4   < > 3.0*  --  5.1  5.1   < > 2.8*   CHLORIDE  --   --   --   --   --   --  105  --   --   --  109*  --   --   --  109*  --  103   CO2  --   --   --   --   --   --  23  --   --   --  21*  --   --   --  18*  --  23   BUN  --   --   --   --   --   --  8.0  --   --   --  7.5*  --   --   --  17.5  --  18.1   CR  --   --   --   --   --   --  0.76  --   --   --  0.76  --  0.74  --  0.86  --  0.75   ANIONGAP  --   --   --   --   --   --  7  --   --   --  6*  --   --   --  12  --  10   OLAF  --   --   --   --   --   --  8.0*  --   --   --  7.5*  --   --   --  7.7*  --  8.5*   * 191* 153*  --   --    < > 107*   < >  --    < > 122*   < >  --    < > 215*   < > 142*   ALBUMIN  --   --   --  0.9*  --   --    --   --   --   --   --   --   --   --   --   --  1.8*   PROTTOTAL  --   --   --  4.4*  --   --   --   --   --   --   --   --   --   --   --   --  5.7*   BILITOTAL  --   --   --  0.5  --   --   --   --   --   --   --   --   --   --   --   --  0.6   ALKPHOS  --   --   --  173*  --   --   --   --   --   --   --   --   --   --   --   --  184*   ALT  --   --   --  17  --   --   --   --   --   --   --   --   --   --   --   --  22   AST  --   --   --  31  --   --   --   --   --   --   --   --   --   --   --   --  35    < > = values in this interval not displayed.     Recent Labs   Lab Test 11/18/21  0544   CHOL 92   HDL 26*   LDL 30   TRIG 180*     Recent Labs   Lab Test 02/17/23  0752 02/16/23  1939 02/16/23  1431   NA  --   --  132*   POTASSIUM  --   --  4.2   CHLORIDE  --   --  93*   CO2  --   --  20*   *   < > 440*   BUN  --   --  34.6*   CR  --   --  0.93   GFRESTIMATED  --   --  66   OLAF  --   --  10.3*    < > = values in this interval not displayed.     Recent Labs   Lab Test 01/03/23  2250 08/17/22  0532 06/17/21  0553   A1C 7.6* 6.9* 8.1*     Recent Labs   Lab Test 02/17/23  0630 02/16/23  1431 02/03/23  0602   HGB 8.0* 10.5* 11.6*     Recent Labs   Lab Test 04/24/18  1021 04/24/18  0348 04/24/18  0006   TROPONINI <0.01 <0.01 <0.01     Recent Labs   Lab Test 07/23/19  1158   NTBNP 54     Recent Labs   Lab Test 02/03/23  0602   TSH 0.82     No results for input(s): INR in the last 34820 hours.    Reta Frias MD

## 2023-02-23 NOTE — PLAN OF CARE
VSS on RA, reports of severe pain on back, managed well with topical diclofenac and 2.5 mg oxycodone IR. Pt continuing to refuses cares and repositions/turns. Diabetic diet but refusing to eat, D5 LR with KCl infusing. Dressings UTV. Hx of dementia and confusion. Taking one to two medications at a time in apple sauce but refused the rest of morning meds. Continue to calorie count and keep receipts of meals regardless of nothing being consumed per doctor. BS are slightly higher today after requesting OJ.  Problem: Plan of Care - These are the overarching goals to be used throughout the patient stay.    Goal: Optimal Comfort and Wellbeing  Outcome: Progressing  Intervention: Provide Person-Centered Care  Recent Flowsheet Documentation  Taken 2/23/2023 0910 by Michael Keller, RN  Trust Relationship/Rapport:    care explained    questions answered    questions encouraged    reassurance provided     Problem: Diabetes Comorbidity  Goal: Blood Glucose Level Within Targeted Range  Outcome: Progressing     Problem: Pain Acute  Goal: Optimal Pain Control and Function  Outcome: Progressing   Goal Outcome Evaluation:

## 2023-02-23 NOTE — PLAN OF CARE
"  Problem: Plan of Care - These are the overarching goals to be used throughout the patient stay.    Goal: Plan of Care Review  Description: The Plan of Care Review/Shift note should be completed every shift.  The Outcome Evaluation is a brief statement about your assessment that the patient is improving, declining, or no change.  This information will be displayed automatically on your shift note.  Outcome: Progressing  Goal: Patient-Specific Goal (Individualized)  Description: You can add care plan individualizations to a care plan. Examples of Individualization might be:  \"Parent requests to be called daily at 9am for status\", \"I have a hard time hearing out of my right ear\", or \"Do not touch me to wake me up as it startles me\".  Outcome: Progressing  Goal: Absence of Hospital-Acquired Illness or Injury  Outcome: Progressing  Intervention: Identify and Manage Fall Risk  Recent Flowsheet Documentation  Taken 2/23/2023 0023 by Liseth Watkins RN  Safety Promotion/Fall Prevention:   bed alarm on   increase visualization of patient   lighting adjusted   fall prevention program maintained  Intervention: Prevent Skin Injury  Recent Flowsheet Documentation  Taken 2/23/2023 0450 by Liseth Watkins RN  Body Position: refuses positioning  Taken 2/23/2023 0221 by Liseth Watkins RN  Body Position:   turned   right   heels elevated  Taken 2/23/2023 0023 by Liseth Watkins RN  Body Position: refuses positioning  Intervention: Prevent and Manage VTE (Venous Thromboembolism) Risk  Recent Flowsheet Documentation  Taken 2/23/2023 0023 by Liseth Watkins RN  VTE Prevention/Management: SCDs (sequential compression devices) on  Goal: Optimal Comfort and Wellbeing  Outcome: Progressing  Intervention: Monitor Pain and Promote Comfort  Recent Flowsheet Documentation  Taken 2/23/2023 0221 by Liseth Watkins RN  Pain Management Interventions: medication (see MAR)  Intervention: Provide Person-Centered Care  Recent " Flowsheet Documentation  Taken 2/23/2023 0023 by Liseth Watkins, RN  Trust Relationship/Rapport:   care explained   questions answered   questions encouraged   reassurance provided  Goal: Readiness for Transition of Care  Outcome: Progressing     Problem: Risk for Delirium  Goal: Optimal Coping  Outcome: Progressing  Goal: Improved Behavioral Control  Outcome: Progressing  Intervention: Minimize Safety Risk  Recent Flowsheet Documentation  Taken 2/23/2023 0023 by Liseth Watkins, RN  Trust Relationship/Rapport:   care explained   questions answered   questions encouraged   reassurance provided  Goal: Improved Attention and Thought Clarity  Outcome: Progressing     Problem: Pain Acute  Goal: Optimal Pain Control and Function  Outcome: Progressing  Intervention: Develop Pain Management Plan  Recent Flowsheet Documentation  Taken 2/23/2023 0221 by Liseth Watkins, RN  Pain Management Interventions: medication (see MAR)     Problem: Electrolyte Imbalance  Goal: Electrolyte Imbalance: Plan of Care  Outcome: Progressing     Problem: Diabetes Comorbidity  Goal: Blood Glucose Level Within Targeted Range  Outcome: Progressing     Problem: Violence Risk or Actual  Goal: Anger and Impulse Control  Outcome: Progressing   Goal Outcome Evaluation:       Pt alert / confused, very irritable and uncooperative with many cares. Pt refusing most turns and yells at staff to leave her alone. Pt refusing any po intake (took one bite of applesauce with prn oxycodone). Son in room irritable and critical of all cares. Wound vac in place with vashe infusion. PICC line patent for flushes but no blood return. Pt had refused lab draw at beginning of shift, so mg recheck will be drawn with am labs. Will monitor.

## 2023-02-23 NOTE — PROGRESS NOTES
Consult received via Phone from Evie Garcia CNP for wound of the sacral and right hip wounds s/p I&D by Dr. Ragsdale (general surgeon). Surgery has signed off.    Please schedule with providers Israel or Amanda at [facility] [place] for next available appointment.    **If scheduling with [facility] please schedule a follow up 2-3 weeks after initial appointment.    Is the patient able to make their own medical decisions? Dementia- Surrogate decision makers are daughter- Alexandra Preciado [phone] and son- Camron Preciado [phone]. AFTER SCHEDULING, PLEASE TRANSFER CALL TO NURSE to obtain POA paperwork.    Is patient a ELENA lift? PLEASE INQUIRE WHEN MAKING THE APPOINTMENT AND PUT IN APPOINTMENT NOTES    Routing to  Wound Healing Scheduling.      Note: When calling, please ASK IF PATIENT IS SCHEDULED TO SEE DR. TRIPLETT. If so, patient may not need scheduling at [facility]--please route back to [facility] nurse.

## 2023-02-24 NOTE — PROGRESS NOTES
PALLIATIVE CARE SOCIAL WORK Progress Note     Co visit with Palliative MD and Pt. Pt was sleepy, but engage a little in conversation. She reported feeling pain and wanting to be left alone to rest.   Dr. Fontanez was able to talk to her about her symptoms and about her thoughts on her care, appetite, feeding tube etc. Pt would like team to talk to her children.   No family at bedside today during visit.      Plan: PCSW is available if further needs arise.     DAPHNEY Fleming, [facility]  Palliative Care Team  Team Pager: [phone]

## 2023-02-24 NOTE — PROGRESS NOTES
CLINICAL NUTRITION SERVICES - REASSESSMENT NOTE    Recommendation to MDs/providers,  Given pt is at risk of refeeding syndrome, RD recommends to monitor/replace Mg++, phos, and K+ when needed.    Recommendations Ordered by Registered Dietitian (RD):   - Ordered new wt, measure when able  - Given pt has been refusing to eat & possible initiation with TF, will discontinue supplements: glucerna once/day + magic cup once/day   Future/Additional Recommendations:   EN support via TF:   Initiate promote w/ fiber @ 10 mL/hr. Once okay to advance OR K+/= 3, Mag ++ >/= 1.5, and phos >/+ 1.9, advance by 15 mL q 8 hrs as tolerated to eventually goal rate of 75 mL/hr.   - 30 mL q 4hrs fluid flushes for tube patency. Fluid restriction or addition fluids adjustments per MD.   - Given wound and suspected long term poor oral intake PTA & minimal oral intake during the admit, add 2 packs of terrence and multivitamin to feeds daily.     Monitor ability to advance to goal EN support,  Promote w/ fiber @ goal of 75 mL/hr (1800 mL/hr), will provides 1960 kcal (27.4 kcal/kg), 117 g protein (1.78 g pro/kg), 1496 mL free water, 248 g CHO, and 25 g fiber daily.      - Given CKD past medical Hx, monitoring renal lab    Malnutrition: moderate malnutrition in context of acute illness     EVALUATION OF PROGRESS TOWARD GOALS   Diet:  Moderate Consistent Carb diet (60 g CHO), Pureed Diet     Supplements: Glucerna once/day + Terrence twice/day + magic cup once/day     Nutrition Support:  None      Intake/Tolerance:  Minimal or none - see Dietetic Intern's brief notes on 2/22 - 2/24     Lab:  Glucose monitoring:   hypoglycemia episode on 2/20 - 2/21  160 - 244 mg/dL (trending high within the last 24 hrs)     Medication:  Lipitor, duloxetine, novolog, lantus, liraglutide (on hold), lopressor, protonix, oxycodone, robaxin,   Dextrose 50% injection at 50 mL (given today) = provides 85 kcal     GI:  2/21/23 last recorded in flowsheet  2/23/23 fecal  "incontinence per flowsheet     ANTHROPOMETRICS  Height: 5'7\"  Most Recent Weight: 78.1 kg (172 lb 2.9 oz)    IBW: 61.4 kg (135 lb)  BMI: Overweight BMI 25-29.9  Past wt history:   Wt Readings from Last 5 Encounters:   02/22/23 02/17/23 66.8 kg (147 lb 4.3 oz)  78.1 kg (172 lb 2.9 oz)   01/13/23 65.9 kg (145 lb 3.2 oz)   12/20/22 69.1 kg (152 lb 4.8 oz)     Comment: 14% wt loss within 5 days; 3.3% wt loss within past 2 months    ASSESSED NUTRITION NEEDS:  Dosing Weight:  65.6 kg (adjusted wt)     Estimated Energy Needs: 5030-9515 kcals/day (25 - 30 kcals/kg)  Justification: Maintenance  Estimated Protein Needs: 99 - 131+ grams protein/day (1.5 - 2+ grams of pro/kg)  Justification: Wound healing for pressure injury stage 4   Estimated Fluid Needs: 1968 mL/day (30 ml/kg)   Justification: Maintenance     NEW FINDINGS:   - Significant wound on sacrum (pressure injury stage 4); R trocanter (pressure injury stage 4), Bilateral heel (unstageable pressure injury, present on admission).  - Plan to place a feeding tube per RN's note/call.      Previous Goals:   P.O. intake >50% of estimated nutrition needs, or equivalent with supplements. - not met/completed: planning to  initiate TF.      Previous Nutrition Diagnosis:   Increased nutrient needs Protein related to multiple wounds as evidenced by need for wound healing    Evaluation: ongoing, not progressing     MALNUTRITION  % Weight Loss: unable to assess - wt data still limited as 14% wt loss within 5 days; 3.3% wt loss within past 2 months; however, likely lost lean wt due to minimal or no food intake x 8 days (2/24)  % Intake:  </= 50% for >/= 5 days (severe malnutrition) - unable to determine the exact date; pt has been eating little or none since hospital. Writer suspected to have longer decreased food intake period PTA per conversation  Subcutaneous Fat Loss:  None observed - only can observe face (2/21)  Muscle Loss:  Unable to observe - pt got frustrated with " questions, not appropriate time to evaluate (2/21)  Fluid Retention: trace: generalized edema, arms, feet, and right hand     Malnutrition Diagnosis: moderate malnutrition in context of acute illness     CURRENT NUTRITION DIAGNOSIS  Malnutrition related to poor intake as evidenced by less than 50% food intake since admission (likely poor po intake for a while PTA per conversation).      INTERVENTIONS  Recommendations / Nutrition Prescription  Given continue to have minimal or no oral intake & agreement with pt's children, pt will be on Nutrition Support.      Implementation  EN support via TF:   Initiate promote w/ fiber @ 10 mL/hr. Once okay to advance OR K+/= 3, Mag ++ >/= 1.5, and phos >/+ 1.9, advance by 15 mL q 8 hrs as tolerated to eventually goal rate of 75 mL/hr.   - 30 mL q 4hrs fluid flushes for tube patency. Fluid restriction or addition fluids adjustments per MD.   - Given wound and suspected long term poor oral intake PTA & minimal oral intake during the admit, add 2 packs of ankit and multivitamin to feeds daily.   Promote w/ fiber @ goal of 75 mL/hr (1800 mL/hr), will provides 1960 kcal (27.4 kcal/kg), 117 g protein (1.78 g pro/kg), 1496 mL free water, 248 g CHO, and 25 g fiber daily.      Given pt is at risk of refeeding syndrome, RD recommends to monitor/replace K+, Mg ++, and phos closely.    Given pt has been refusing to eat & possible initiation with TF, will discontinue supplements: glucerna once/day + magic cup once/day    Goals  Diet advancement vs nutrition support within 2-3 days.  -180 mg/dL.    MONITORING AND EVALUATION:  Progress towards goals will be monitored and evaluated per protocol and Practice Guidelines     Ira Kingston ([place]) Dietetic Intern

## 2023-02-24 NOTE — PROGRESS NOTES
Patient is currently hospitalized, son declined to schedule at this time.  They have the number and will call back when she is released.

## 2023-02-24 NOTE — PROGRESS NOTES
[facility]    Infectious Disease Progress Note    Date of Service : 02/24/2023     Assessment & Plan   Cristal Preciado is a 72 year old female who was admitted on 2/16/2023.     ASSESSMENT:  1. CVA, sacral decubitus ulceration on admission, status post debridement of sacral ulcer and debridement of right hip ulcer on 2/17/2023  2. Osteomyelitis, both ulcerations go down to bone per operative report  3. Infected decubitus ulcer: Proteus, Enterococcus, E. coli cultures, all sensitive to Zosyn.  Tolerating Zosyn well.  CRP much better.  4. Multiple comorbid conditions: Diabetes, cognitive impairment, sacral wound, coronary artery disease, hypertension, hyperlipidemia        RECOMMENDATIONS:  1. Continue IV Zosyn.  2. Monitor CBC, CMP.  3. Plan for 4 to 6 weeks of IV Zosyn.      Discussed with patient, nursing staff.    ID does not plan to see over the weekend.    Giovanna Nelson MD  [facility]  [phone]      Interval History   Doing okay today.  Does not [place] to work.  CRP much better.      Physical Exam   Temp: 97.6  F (36.4  C) Temp src: Oral BP: 138/61 Pulse: 64   Resp: 16 SpO2: 99 % O2 Device: None (Room air)    Vitals:    02/17/23 1129 02/22/23 0448   Weight: 78.1 kg (172 lb 2.9 oz) 66.8 kg (147 lb 4.3 oz)     Vital Signs with Ranges  Temp:  [97.6  F (36.4  C)-99.2  F (37.3  C)] 97.6  F (36.4  C)  Pulse:  [55-66] 64  Resp:  [16-18] 16  BP: (108-138)/(52-61) 138/61  SpO2:  [98 %-99 %] 99 %    Constitutional: No apparent distress  Lungs: normal breathing pattern, no crackles or wheezing  Cardiovascular: Regular rate and rhythm, S1 S2  Abdomen: Slightly distended  Skin: warm  Ulcerations, see photos above decubitus  Neuro: deconditioned, CVA    Media Information  Document Information    Other:  Photograph      02/16/2023 7:15 PM   Attached To:   Hospital Encounter on 2/16/23     Source Information    Waylon Singer MD  [facility]      Media Information  Document Information    Other:  Photograph      2023 7:14 PM   Attached To:   Hospital Encounter on 23     Source Information    Waylon Singer MD  [facility]         Medications     IV infusion builder /PEDS non-standard dextrose or NaCl 75 mL/hr at 23 1031     - MEDICATION INSTRUCTIONS -         atorvastatin  80 mg Oral QPM     diclofenac  2 g Topical 4x Daily     dorzolamide-timolol  1 drop Both Eyes BID     DULoxetine  90 mg Oral QAM     insulin aspart  1-7 Units Subcutaneous TID AC     [START ON 2023] insulin glargine  8 Units Subcutaneous QAM AC     latanoprost  1 drop Both Eyes At Bedtime     [Held by provider] liraglutide  1.2 mg Subcutaneous Daily     metoprolol tartrate  50 mg Oral BID     olopatadine  1 drop Both Eyes Daily     pantoprazole  40 mg Oral BID AC     piperacillin-tazobactam  3.375 g Intravenous Q8H     polyvinyl alcohol  1 drop Both Eyes TID     sodium chloride (PF)  10-30 mL Intracatheter Q8H     sodium chloride (PF)  3 mL Intracatheter Q8H     traZODone  50 mg Oral At Bedtime       Data   All microbiology laboratory data reviewed.  Recent Labs   Lab Test 23  0523 23  0643 23  1106   WBC 6.2 7.4 8.0   HGB 8.4* 8.3* 7.4*   HCT 27.4* 26.0* 23.2*   MCV 93 90 89    236 209     Recent Labs   Lab Test 23  0557 23  0643 23  1106   CR 0.76 0.76 0.74     Recent Labs   Lab Test 23  1431   *     Recent Labs   Lab Test 19  0835 04/29/15  1420   CULT No growth No growth after 4 weeks       MICROBIOLOGY:    Reviewed   Contains abnormal data Wound Aerobic Bacterial Culture Routine  Order: [id number]   Collected 2023  2:32 PM      Status: Preliminary result      Visible to patient: No (not released)     Specimen Information: Leg, Right; Wound    0 Result Notes  Culture Culture in progress       2+ Proteus mirabilis Abnormal        1+ Enterococcus faecalis Abnormal        1+  Escherichia coli Abnormal     Preliminary result, confirmation pending.   4+ Normal brittani            Resulting Agency: [facility]     Susceptibility     Proteus mirabilis Enterococcus faecalis     CARLA CARLA     Ampicillin <=2 ug/mL Susceptible <=2 ug/mL Susceptible     Ampicillin/ Sulbactam <=2 ug/mL Susceptible       Cefepime <=1 ug/mL Susceptible       Ceftazidime <=1 ug/mL Susceptible       Ceftriaxone <=1 ug/mL Susceptible       Ciprofloxacin <=0.25 ug/mL Susceptible       Gentamicin <=1 ug/mL Susceptible       Gentamicin Synergy   Susceptible... Susceptible 1     Levofloxacin <=0.12 ug/mL Susceptible       Meropenem <=0.25 ug/mL Susceptible       Penicillin   4 ug/mL Susceptible     Piperacillin/Tazobactam <=4 ug/mL Susceptible       Tobramycin <=1 ug/mL Susceptible       Trimethoprim/Sulfamethoxazole <=1/19 ug/mL Susceptible       Vancomycin   2 ug/mL Susceptible                1 No high level gentamicin resistance found - therefore combination therapy with an aminoglycoside may be indicated for serious enterococcal infections such as bacteremia and endocarditis.            Specimen Collected: 02/16/23  2:32 PM Last Resulted: 02/19/23  3:21 PM           Abscess Aerobic Bacterial Culture Routine  Order: [id number]   Collected 2/17/2023  8:45 PM      Status: Preliminary result      Visible to patient: No (not released)     Specimen Information: Hip, Right; Abscess    0 Result Notes  Culture 2+ Escherichia coli Abnormal        2+ Proteus mirabilis Abnormal        1+ Enterococcus faecalis Abnormal             Resulting Agency: [facility]     Susceptibility     Escherichia coli Proteus mirabilis Enterococcus faecalis     CARLA CARLA CARLA     Ampicillin <=2 ug/mL Susceptible <=2 ug/mL Susceptible <=2 ug/mL Susceptible     Ampicillin/ Sulbactam <=2 ug/mL Susceptible <=2 ug/mL Susceptible       Cefepime <=1 ug/mL Susceptible <=1 ug/mL Susceptible       Ceftazidime <=1 ug/mL Susceptible <=1 ug/mL Susceptible       Ceftriaxone <=1 ug/mL  Susceptible <=1 ug/mL Susceptible       Ciprofloxacin <=0.25 ug/mL Susceptible <=0.25 ug/mL Susceptible       Gentamicin <=1 ug/mL Susceptible <=1 ug/mL Susceptible       Gentamicin Synergy     Susceptible... Susceptible 1     Levofloxacin <=0.12 ug/mL Susceptible <=0.12 ug/mL Susceptible       Meropenem <=0.25 ug/mL Susceptible <=0.25 ug/mL Susceptible       Penicillin     4 ug/mL Susceptible     Piperacillin/Tazobactam <=4 ug/mL Susceptible <=4 ug/mL Susceptible       Tobramycin <=1 ug/mL Susceptible <=1 ug/mL Susceptible       Trimethoprim/Sulfamethoxazole <=1/19 ug/mL Susceptible <=1/19 ug/mL Susceptible       Vancomycin     2 ug/mL Susceptible                  1 No high level gentamicin resistance found - therefore combination therapy with an aminoglycoside may be indicated for serious enterococcal infections such as bacteremia and endocarditis.            Specimen Collected: 02/17/23  8:45 PM Last Resulted: 02/20/23 12:42 AM                  7-Day Micro Results     Collected Updated Procedure Result Status      02/17/2023 2045 02/22/2023 1057 Anaerobic Bacterial Culture Routine [[id number]]   (Abnormal)   Abscess from Hip, Right    Final result Component Value   Culture 1+ Bacteroides ovatus/xylanisolvens    Susceptibilities not routinely done, refer to antibiogram to view typical susceptibility profiles    4+ Mixed Aerobic and Anaerobic brittani               02/17/2023 2045 02/18/2023 0132 Gram Stain [[id number]]   (Abnormal)   Abscess from Hip, Right    Final result Component Value   Gram Stain Result 3+ Gram positive cocci   Gram Stain Result 2+ Gram negative bacilli   Gram Stain Result 4+ WBC seen   Predominantly PMNs            02/17/2023 2045 02/20/2023 1147 Abscess Aerobic Bacterial Culture Routine [[id number]]    (Abnormal)   Abscess from Hip, Right    Final result Component Value   Culture 2+ Escherichia coli    2+ Proteus mirabilis    1+ Enterococcus faecalis    3+ Normal brittani         Susceptibility      Escherichia coli      CARLA      Ampicillin <=2 ug/mL Susceptible      Ampicillin/ Sulbactam <=2 ug/mL Susceptible      Cefepime <=1 ug/mL Susceptible      Ceftazidime <=1 ug/mL Susceptible      Ceftriaxone <=1 ug/mL Susceptible      Ciprofloxacin <=0.25 ug/mL Susceptible      Gentamicin <=1 ug/mL Susceptible      Levofloxacin <=0.12 ug/mL Susceptible      Meropenem <=0.25 ug/mL Susceptible      Piperacillin/Tazobactam <=4 ug/mL Susceptible      Tobramycin <=1 ug/mL Susceptible      Trimethoprim/Sulfamethoxazole <=1/19 ug/mL Susceptible                    Susceptibility      Proteus mirabilis      CARLA      Ampicillin <=2 ug/mL Susceptible      Ampicillin/ Sulbactam <=2 ug/mL Susceptible      Cefepime <=1 ug/mL Susceptible      Ceftazidime <=1 ug/mL Susceptible      Ceftriaxone <=1 ug/mL Susceptible      Ciprofloxacin <=0.25 ug/mL Susceptible      Gentamicin <=1 ug/mL Susceptible      Levofloxacin <=0.12 ug/mL Susceptible      Meropenem <=0.25 ug/mL Susceptible      Piperacillin/Tazobactam <=4 ug/mL Susceptible      Tobramycin <=1 ug/mL Susceptible      Trimethoprim/Sulfamethoxazole <=1/19 ug/mL Susceptible                    Susceptibility      Enterococcus faecalis      CARLA      Ampicillin <=2 ug/mL Susceptible      Gentamicin Synergy Susceptible ug/mL Susceptible  [1]       Penicillin 4 ug/mL Susceptible      Vancomycin 2 ug/mL Susceptible                   [1]  No high level gentamicin resistance found - therefore combination therapy with an aminoglycoside may be indicated for serious enterococcal infections such as bacteremia and endocarditis.                        RADIOLOGY:    Reviewed  CT Pelvis Soft Tissue w Contrast    Result Date: 2/16/2023  EXAM: CT PELVIS SOFT TISSUE W CONTRAST LOCATION: [facility] DATE/TIME: 2/16/2023 6:31 PM INDICATION: Malodorous wounds to left buttock and right hip, severe sepsis on lab testing today COMPARISON: 1/3/2023 TECHNIQUE: CT scan  of the pelvis was performed with IV contrast. Multiplanar reformats were obtained. Dose reduction techniques were used. CONTRAST: 100ml isovue 370 FINDINGS: Mild motion artifact. PELVIC ORGANS: Increased circumferential bladder wall thickening measuring up to 0.8 cm is consistent with cystitis. Hysterectomy. Atherosclerotic vascular disease. MUSCULOSKELETAL: A new sacral decubitus ulcer with subcutaneous emphysema extending into the surrounding soft tissues with associated inflammatory changes. A new decubitus ulcer at the right greater trochanter with surrounding inflammatory changes. No loculated drainable fluid collection. No aggressive osseous erosion. Degenerative changes of the spine and hips.     IMPRESSION: 1.  New sacral decubitus ulcer with subcutaneous emphysema extending into the surrounding soft tissues. This could represent necrotizing fasciitis. 2.  A new right greater trochanter decubitus ulcer. 3.  Cystitis. [Critical Result: Necrotizing fasciitis] Finding was identified on 2/16/2023 6:35 PM. Dr. Singer was contacted by me on 2/16/2023 6:42 PM and verbalized understanding of the critical result.

## 2023-02-24 NOTE — PROGRESS NOTES
[facility]    PROGRESS NOTE - Hospitalist Service    Assessment and Plan    Principal Problem:    Severe sepsis (H)  Active Problems:    CAD (coronary artery disease)    Hypertension    Hyperlipidemia    H/O: stroke    Hypomagnesemia    DM type 2 w Neuro/Retin/Nephr -- hgb A1C 7.6 on 1/3/23    Oropharyngeal dysphagia    Cognitive impairment Consistent with Mild Dementia    Wound infection    Sacral wound, initial encounter    Hypokalemia    Hypercalcemia    Anemia due to blood loss, acute    Cristal Preciado is a 72 year old female with h/o sacral decubitus ulcer (present on admission), CVA, dementia, neuropathy, DM2, CKD, CAD, failure to thrive admitted on 2/16/2023 with infected new sacral decubitus ulcer with possible necrotizing fasciitis.      Severe sepsis secondary to Infected Sacral Decubitus ulcer/Possible Necrotizing fasciits,  - Lactic acid= 6.4, WBC= 14.1. Sepsis criteria met with leukocytosis and tachycardia likely source being infected decubitus ulcers right IT and sacrococcygeal decubitus ulcer with lactic acidosis  - 2/17 s/p  I&D by Dr. Ragsdale   - Initially treated with IV Vancomycin, IV Zosyn. ID consulted and stopped clindamycin  - Cultures growing Proteus, E.coli and Enterococcus sensitive to Zosyn  - blood cx NGTD  - WOC for wound assessment  - PICC line in right arm  - Per ID, plan for 4-6 weeks of IV Zosyn  - Patient has a wound VAC for right IT wound and sacrococcygeal wound    Malnutrition  - Level of malnutrition: Moderate   % Weight Loss:  Unable to assess wt since hospital stay due to no new wt (will order a new wt)  % Intake:  </= 50% for >/= 5 days (severe malnutrition) - unable to determine the exact date; pt has been eating little or none since hospital. Writer suspected to have longer decreased food intake period PTA per conversation  Subcutaneous Fat Loss:  None observed - only can observe face  Muscle Loss:  Unable to observe - pt got frustrated with questions,  not appropriate time to evaluate  Fluid Retention:  Mild: left arm & right hand; trace: generalized edema    -Patient with extremely poor oral intake for the last 7 days  -She has taken a few bites of applesauce and maybe some juice at times but requires a lot of coaxing  -Patient presented with stage IV decubitus ulcers and after debridement are being treated with a wound VAC  -Discussed with patient's son, Camron today.  Attempted to contact patient's daughter Court again but unfortunately could not connect with her  -Patient's son says that patient was never diagnosed with dementia.  Patient's son says that her mentation did decline after she had COVID infection at the end of  and was admitted to [facility].  -Patient's brother  approximately 2 weeks ago and son feels that this is also affected her mood and her mentation and her interest in oral intake  -Her POLST from  does say that she would be agreeable to tube feeds.  -Discussed with Cristal today.  She seemed to somewhat understand Dobbhoff/Keofeed placement.  Initially she refused.  After it was explained to her that her wounds would not likely heal without good nutrition and would probably get worse, she did agree to have the feeding tube placed  -Dobbhoff/Keofeed tube placement to be done by radiology  -Patient's son is also in agreement.  Unable to contact patient's daughter  -Patient's son understands that a nasojejunal tube would only be a temporary solution.  Eventually, decision would have to be made regarding G-tube placement  -Requested palliative care consult to address goals of care.  Discussed with them this morning.  -Please also see social work note from today documenting the discussion with supervisor of [facility].    Malnutrition:   - Level of malnutrition: Moderate   % Weight Loss:  Unable to assess wt since hospital stay due to no new wt (will order a new wt)  % Intake:  </= 50% for >/= 5 days  (severe malnutrition) - unable to determine the exact date; pt has been eating little or none since hospital. Writer suspected to have longer decreased food intake period PTA per conversation  Subcutaneous Fat Loss:  None observed - only can observe face  Muscle Loss:  Unable to observe - pt got frustrated with questions, not appropriate time to evaluate  Fluid Retention:  Mild: left arm & right hand; trace: generalized edema    Type II DM  - HgbA1c on 01/03/2023 is 7.6 %  - Patient is refusing to eat and has had numerous hypoglycemic episodes  - Lantus reduced to 5 units subcu every morning and patient started on D5 LR with 20 mEq of potassium chloride IV fluids  - PTA Victoza and metformin on hold  - Continue Accucheck, Insulin sliding scale and hypoglycemic protocol  - Once tube feeds are started then will need to increase insulin    HTN  -Blood pressure is well controlled  -Continue PTA metoprolol     Acute blood loss anemia   - Hgb came back at 5.7 after surgery  - transfused 2 units of packed RBCs  - transfuse to keep Hgb > 7  - Hemoglobin has been fairly stable     UTI  - Urinalysis reviewed and consistent with infection.   - Urine culture grew mixed urogenital brittani  - Currently on IV Zosyn for decubitus ulcers     Hyponatremia- Mild. Resolved. Likely due to hypovolemia and poor oral intake  - Back on IVF  - Na level is now normal  - Monitor BMP  - Once tube feeds started, discontinue IV fluids     Hypercalcemia - resolved may have been due to dehydration    Hypokalemia  - replacement protocol     Hypomagnesemia  - Replace per protocol    H/o dysphagia and son is not certain of exact diet but he is aware she's on a modified diet, but uncertain of type  - Reviewed Video swallow study done on 10/07/2022  - Patient with inadequate oral intake since admission  - Discussed with patient and patient's son.  Recommend Dobbhoff/Keofeed nasojejunal feeding tube insertion for temporary tube feeds  - Patient will  "probably also need G-tube  - Palliative care consult requested to address goals of care    Heel ulcers assessed by wound nurse   - continue to follow recommendations    Clinically Significant Risk Factors                        # Overweight: Estimated body mass index is 28.31 kg/m  as calculated from the following:    Height as of this encounter: 1.626 m (5' 4\").    Weight as of this encounter: 74.8 kg (164 lb 14.4 oz)., PRESENT ON ADMISSION         COVID-19 PCR Results    COVID-19 PCR Results 1/14/22 1/18/22 1/21/22 1/25/22 1/28/22 2/1/22 2/4/22 2/8/22 2/11/22 2/15/22   COVID-19 Virus by PCR (External Result)             SARS CoV2 PCR Negative  Negative  Negative  Negative Negative Negative Negative   COVID-19 Virus PCR - Result  NOT DETECTED  NOT DETECTED  NOT DETECTED          Comments are available for some flowsheets but are not being displayed.         COVID-19 Antibody Results, Testing for Immunity    COVID-19 Antibody Results, Testing for Immunity   No data to display.            Code Status: Full Code  VTE prophylaxis:  Pneumatic Compression Devices  DIET: Orders Placed This Encounter      Combination Diet Moderate Consistent Carb (60 g CHO per Meal) Diet; Soft and Bite Sized Diet (level 6); Thin Liquids (level 0)    Drains/Lines: PICC line  Weight bearing status: bedrest, per son she does not ambulate   Disposition/Barriers to discharge: Nasojejunal feeding tube to be placed today.  Palliative care consult requested to address goals of care.  Likely to remain in the hospital for the next 3 to 5 days    Expected Discharge Date:    TBD      Interval History   Pertinent overnight events: None    Subjective:  Cristal was oriented to place and person.  She thought it was January 2022.  Later she corrected herself and said it was 2023.  She was able to answer simple questions appropriately.  She says that her back hurts.  She is not really able to tell me why she does want to eat.  Discussed with her the benefit of " tube feeds, especially with her wounds.  Initially she refused but then agreed.  Son was present in the room.    PHYSICAL EXAM  Temp:  [97.6  F (36.4  C)-99.2  F (37.3  C)] 97.6  F (36.4  C)  Pulse:  [55-66] 64  Resp:  [16-18] 16  BP: (108-138)/(52-61) 138/61  SpO2:  [98 %-99 %] 99 %  Wt Readings from Last 1 Encounters:   02/22/23 66.8 kg (147 lb 4.3 oz)       Intake/Output Summary (Last 24 hours) at 2/17/2023 0840  Last data filed at 2/17/2023 0646  Gross per 24 hour   Intake 3450 ml   Output 730 ml   Net 2720 ml      Body mass index is 23.07 kg/m .    Physical Exam  Constitutional:       Comments: Chronically ill-appearing NAD   HENT:      Head: Normocephalic.      Mouth/Throat:      Mouth: Mucous membranes are dry.   Cardiovascular:      Rate and Rhythm: Normal rate and regular rhythm.      Pulses: Normal pulses.   Pulmonary:      Effort: Pulmonary effort is normal. No respiratory distress.      Breath sounds: Normal breath sounds.   Abdominal:      General: Bowel sounds are normal. There is no distension.      Palpations: Abdomen is soft.      Tenderness: There is no abdominal tenderness.   Musculoskeletal:      Cervical back: Neck supple.      Right lower leg: Edema present.      Left lower leg: Edema present.      Comments: Ulcers left heel large black eschar. Right heel eschar smaller ulcer  Right hip wound VAC  Dry cracked skin   Skin:     General: Skin is warm and dry.   Neurological:      Mental Status: She is alert. Mental status is at baseline.      Comments: Oriented to place and person.  She knew the year but did not know the day, date or the month.       PERTINENT LABS/IMAGING:  Results for orders placed or performed during the hospital encounter of 02/16/23   CT Pelvis Soft Tissue w Contrast   Result Value Ref Range    Radiologist flags Necrotizing fasciitis (AA)     Impression    IMPRESSION:  1.  New sacral decubitus ulcer with subcutaneous emphysema extending into the surrounding soft tissues. This  could represent necrotizing fasciitis.  2.  A new right greater trochanter decubitus ulcer.  3.  Cystitis.      [Critical Result: Necrotizing fasciitis]    Finding was identified on 2/16/2023 6:35 PM.     Dr. Singer was contacted by me on 2/16/2023 6:42 PM and verbalized understanding of the critical result.          Recent Labs   Lab 02/24/23  1211 02/24/23  0801 02/24/23  0515 02/23/23 2027 02/23/23  0820 02/23/23  0722 02/23/23  0523 02/22/23  0812 02/22/23  0557 02/20/23  0757 02/20/23  0643 02/19/23  1152 02/19/23  1106 02/18/23  0647 02/18/23  0247   WBC  --   --   --   --   --   --  6.2  --   --   --  7.4  --  8.0  --  10.0   HGB  --   --   --   --   --   --  8.4*  --   --   --  8.3*  --  7.4*   < > 5.7*   MCV  --   --   --   --   --   --  93  --   --   --  90  --  89  --  95   PLT  --   --   --   --   --   --  230  --   --   --  236  --  209  --  224   NA  --   --   --   --   --   --   --   --  135*  --  136  --   --   --  139   POTASSIUM  --   --  4.4  --   --  4.4  --   --  4.3  4.2   < > 4.4   < > 3.0*  --  5.1  5.1   CHLORIDE  --   --   --   --   --   --   --   --  105  --  109*  --   --   --  109*   CO2  --   --   --   --   --   --   --   --  23  --  21*  --   --   --  18*   BUN  --   --   --   --   --   --   --   --  8.0  --  7.5*  --   --   --  17.5   CR  --   --   --   --   --   --   --   --  0.76  --  0.76  --  0.74  --  0.86   ANIONGAP  --   --   --   --   --   --   --   --  7  --  6*  --   --   --  12   OLAF  --   --   --   --   --   --   --   --  8.0*  --  7.5*  --   --   --  7.7*   * 212*  --  196*   < >  --   --    < > 107*   < > 122*   < >  --    < > 215*   ALBUMIN  --   --   --   --   --  0.9*  --   --   --   --   --   --   --   --   --    PROTTOTAL  --   --   --   --   --  4.4*  --   --   --   --   --   --   --   --   --    BILITOTAL  --   --   --   --   --  0.5  --   --   --   --   --   --   --   --   --    ALKPHOS  --   --   --   --   --  173*  --   --   --   --   --   --   --    --   --    ALT  --   --   --   --   --  17  --   --   --   --   --   --   --   --   --    AST  --   --   --   --   --  31  --   --   --   --   --   --   --   --   --     < > = values in this interval not displayed.     Recent Labs   Lab Test 11/18/21  0544   CHOL 92   HDL 26*   LDL 30   TRIG 180*     Recent Labs   Lab Test 02/17/23  0752 02/16/23  1939 02/16/23  1431   NA  --   --  132*   POTASSIUM  --   --  4.2   CHLORIDE  --   --  93*   CO2  --   --  20*   *   < > 440*   BUN  --   --  34.6*   CR  --   --  0.93   GFRESTIMATED  --   --  66   OLAF  --   --  10.3*    < > = values in this interval not displayed.     Recent Labs   Lab Test 01/03/23  2250 08/17/22  0532 06/17/21  0553   A1C 7.6* 6.9* 8.1*     Recent Labs   Lab Test 02/17/23  0630 02/16/23  1431 02/03/23  0602   HGB 8.0* 10.5* 11.6*     Recent Labs   Lab Test 04/24/18  1021 04/24/18  0348 04/24/18  0006   TROPONINI <0.01 <0.01 <0.01     Recent Labs   Lab Test 07/23/19  1158   NTBNP 54     Recent Labs   Lab Test 02/03/23  0602   TSH 0.82     No results for input(s): INR in the last 46070 hours.    Reta Frias MD

## 2023-02-24 NOTE — PROGRESS NOTES
[facility] Nurse Inpatient Assessment     Consulted for: Buttocks, heels    Patient History (according to provider note(s):      Cristal Preciado is a 72 year old female with PMH significant for known sacral decubitus ulcer (present on admission), CVA, dementia, neuropathy, DM2, CKD, CAD and FTT admitted on 2/16/2023 with infected new sacral decubitus ulcer with possible necrotizing fasciitis.      1. Sepsis- Admit patient. Infected Sacral Decubitus ulcer/Possible Necrotizing fasciits,UTI,Lactic acid= 6.4, WBC= 14.1. CT pelvis report reviewed. ED physician has already been in contact with General Surgeon on call and patient will be seen in consultation. Continue Vancomycin, Zosyn and Clindamycin. Continue IV fluids. Currently NPO. Repeat labs in a.m. Monitor vitals closely. Will make further recommendations based on clinical course.     2. Possible Necrotizing Fasciitis- ED has contacted General Surgeon on call and patient will be seen in consultation. Continue Vancomycin, Zosyn and Clindamycin. Plan per surgeon.    Areas Assessed:      Pressure Injury Location: Sacrum        2/20 2/22 2/24    Wound type: Pressure Injury     Pressure Injury Stage: 4, present on admission   Wound history/plan of care:   Patient admitted with worsening pressure wounds, was taken for an I&D    Wound base: 30 % slough and bone, 70 % dermis, non-granular tissue and adipose tissue, scant granulation starting     Palpation of the wound bed: boggy      Drainage: scant     Description of drainage: serosanguinous     Measurements (length x width x depth, in cm) 10  x 12  x  3.5 cm      Tunneling N/A     Undermining up to 1.5 cm from 8-11 o'clock  Periwound skin: Intact      Color: normal and consistent with surrounding tissue      Temperature: normal   Odor: none  Pain: moderate,  with palpation and during dressing change  Pain intervention prior to dressing change: slow and gentle cares   Treatment goal: Heal , Increase granulation and Protection  STATUS: evolving  Supplies ordered: gathered    My PI Risk Assessment     Sensory Perception: 2 - Very Limited     Moisture: 1 - Constantly moist     Activity: 1 - Bedfast      Mobility: 2 - Very limited     Nutrition: 2 - Probably inadequate      Friction/Shear: 1 - Problem     TOTAL: 9  __________________________________________________________________________________________________________________  [facility]       2/20 2/22 2/24    Wound type: Pressure Injury     Pressure Injury Stage: 4, present on admission   Wound history/plan of care:   See above    Wound base: 30 % slough and bone, 70 % non-granular tissue and adipose tissue, scant granulation      Palpation of the wound bed: normal      Drainage: scant     Description of drainage: serosanguinous     Measurements (length x width x depth, in cm) 12  x 10.5  x  3 cm      Tunneling N/A     Undermining N/A  Periwound skin: Intact      Color: normal and consistent with surrounding tissue      Temperature: normal   Odor: none  Pain: mild,   Pain intervention prior to dressing change: slow and gentle cares   Treatment goal: Heal , Increase granulation and Protection  STATUS: evolving  Supplies ordered: gathered  ________________________________________________________________________________________________________________    Negative pressure wound therapy [facility]   Last photo: see above  Wound due to: Pressure Injury   Wound history/plan of care:      Surgical date: 2/18/23     Date Negative Pressure Wound Therapy initiated: 2/20/23     Interventions in place: repositioning, pressure redistribution with device  or dressing, specialty surface in use, moisture/incontinence management and offloading    Is patient s nutritional status compromised? yes   a. If yes, what interventions are in place? Protein supplements    Reason for initiating vac therapy? Presence of co-morbidities, High risk of infections, Need for accelerated granulation tissue and Prior history of delayed wound healing    Which?of?the?following?co-morbidities?apply? Immobility  a. If diabetic is patient on a diabetic management program? N/A     Is osteomyelitis present in wound? Y  a.  If yes what treatments are in place? N/A    Number of foam pieces removed from a wound (excluding foam for bridge) : 4 total pieces of cleanse choice   Verified this matched the number of foam pieces applied last dressing change: N/A   Number of foam pieces packed into wound (excluding foam for bridge) : 3 pieces  GranuFoam Black. To sacral wound: 20cc Vashe, 5 minute soak, every 4 hours, -125mmHg.   _____________________________________________________________________________________________________________________________________________________________________    Pressure Injury Location: Bilateral heels    2/20 L heel      2/20 R heel    Wound type: Pressure Injury     Pressure Injury Stage: Unstageable, present on admission      Wound history/plan of care:   See above    Wound base: 100 % eschar     Palpation of the wound bed: firm      Drainage: none     Description of drainage: none     Measurements (length x width x depth, in cm)   L heel: 4  x 5.5 cm    R heel: 2  x 2 cm      Tunneling N/A     Undermining N/A  Periwound skin: Intact and Scar tissue      Color: pink      Temperature: normal   Odor: none  Pain: absent, none  Pain intervention prior to dressing change: slow and gentle cares   Treatment goal: Maintain (prevention of deterioration) and offload pressure, reduce friction and shear  STATUS: initial assessment  Supplies ordered: supplies stored on unit - no  "change in care needed      Treatment Plan:     Negative pressure wound therapy plan:  Wound location: Sacrum + R Trocanter    Change Days: Mon/Wed/Fri by [facility] RN    Supplies (including all accessories) used: medium Black foam , Adapt barrier ring and Cavilon no sting barrier film  Cleanse with Vashe prior to replacing VAC    Suction setting: -125   Methods used: Window paned all periwound skin with vac drape prior to applying sponge and barrier rings around entire sacral wound to protect from stool     Staff RN to assess integrity of dressing and ensure suction is set at appropriate level every shift.   Date canister. Chart canister output every shift. Change cannister weekly and PRN if full/occluded.    Remove foam dressing and replace with BID normal saline moist gauze dressing if:   -a dressing failure which cannot be repaired within 2 hours   -patient is discharging to home without a home pump   -patient is discharging to a facility outside the local area   -if a dressing is a \"Silver Foam\", remove before Radiation Therapy or MRI     The hospital VAC pump is not to be discharged with the patient.?Ensure to disconnect patient from machine prior to discharge. Then,    - If a home KCI VAC pump has been delivered, connect home cannister to dressing tubing then connect cannister to home pump and turn on machine    - If transferring to a nearby facility with a KCI vac, can disconnect and clamp tubing then cover end with glove so can be reconnected within 2 hours      Heels  1. Paint with betadine daily, allow to dry  2. Offload heels at all times while in bed, utilize prevlon boots    RECOMMEND PRIMARY TEAM ORDER: None, at this time  Education provided: importance of repositioning, wound progress, Infection prevention , Moisture management and Off-loading pressure  Discussed plan of care with: Patient, Family, Nurse and Physician  [facility] nurse follow-up plan: Monday/WednesdayFriday  Notify [facility] if wound(s) " deteriorate.  Nursing to notify the Provider(s) and re-consult the [facility] Nurse if new skin concern.    DATA:     Current support surface: Standard  Standard gel/foam mattress (IsoFlex, Atmos air, etc)  Containment of urine/stool: Incontinence Protocol  BMI: Body mass index is 23.07 kg/m .   Active diet order: Orders Placed This Encounter      Combination Diet Moderate Consistent Carb (60 g CHO per Meal) Diet; Soft and Bite Sized Diet (level 6); Thin Liquids (level 0)     Output: I/O last 3 completed shifts:  In: 360 [P.O.:360]  Out: 1900 [Urine:1900]     Labs:   Recent Labs   Lab 02/23/23  0722 02/23/23  0523   ALBUMIN 0.9*  --    HGB  --  8.4*   WBC  --  6.2     Pressure injury risk assessment:   Sensory Perception: 2-->very limited  Moisture: 3-->occasionally moist  Activity: 1-->bedfast  Mobility: 1-->completely immobile  Nutrition: 3-->adequate  Friction and Shear: 2-->potential problem  Lalit Score: 12    CRIS AMBRIZN RN CWOCN  Pager no longer is use, please contact through [employer]adeline group: [facility] Nurse

## 2023-02-24 NOTE — PROGRESS NOTES
Clinical Nutrition Service - Brief Notes + Calorie Counts    Calorie Counts:    2/24 no tickets found. Continue to have minimal oral intake per RN's note x 8 days with suspecting of low food intake for a while PTA per conversation with pt.     New findings:    Per palliative team notes, team unable to discuss plan with pt and will attempt to connect with pt's family when able.     If nutrition support is the plan, nutrition service is available when consult.    rIa (Mena) Thee, Dietetic Intern

## 2023-02-24 NOTE — PLAN OF CARE
Problem: Risk for Delirium  Goal: Improved Attention and Thought Clarity  Intervention: Maximize Cognitive Function  Recent Flowsheet Documentation  Taken 2/24/2023 0800 by Emmy Ren, RN  Reorientation Measures: clock in view     Problem: Diabetes Comorbidity  Goal: Blood Glucose Level Within Targeted Range  Outcome: Progressing     Problem: Pain Acute  Goal: Optimal Pain Control and Function  Outcome: Progressing   Goal Outcome Evaluation:       Patent complain of mostly generalized pain, oxycodone given per orders which was effective. Patient does not have much of appetite for food but accepted fluids. Turned and repositioned for comfort. Wound vac in place and functional. Plan in place to place feeding tube at Xray.

## 2023-02-24 NOTE — CONSULTS
"[facility]  Palliative Care Consultation Note    Patient: Cristal Preciado  Date of Admission:  2/16/2023    Requesting Clinician / Team: Beny Frias MD/[facility]  Reason for consult: \"Pt here with sepsis due to infected stage IV decubitus ulcers, dementiia, residing in LTC who has been refusing to eat.  Consult for goals of care.\"    Impression & Recommendations:  Multiple visits to try to connect with daughter Court [called \"Alexandra\" per Cristal/first degree family members] and able to contact and communicate with son Camron, and finally able to speak with Court at 1645 today.  -->Strongly recommend formal family care conference for informational purposes and discussion of advanced care planning and goals of care---and would suggest scheduling for Monday between 10a-2p OR preferably Tuesday between 10a-2p.  Given lack of designated health care agent, would want to have informational meeting with family members who would be engaged in surrogate decision making for Cristal to ascertain their understanding of her clinical situation and her prognosis as multiple providers this hospital stay have expressed concern.  Would also want to clarify what brings sharyn and meaning to Cristal to support best QoL moving forward.  -->Prognosis appears to be months, potentially longer if Cristal is able to tolerate artificial nutritional support.    -->The writer has concern about whether Cristal will tolerate nasal tube feedings based on her initial response to my query (see below). However, given her poor oral intake and currently life-prolonging goals of treatment, it makes sense for family to try this.  If Cristal repeatedly attempts to remove the nasal feeding tube, requires prolonged restraints, etc would need further discussion--even if a patient lacks full decisional capacity, she/he can refuse treatments; forcing a patient to repeatedly accept a treatment they verbalize refusal to would be ethically " "problematic and potentially considered battery.    Goals of care: life prolonging, currently without limits.    Advanced care planning: has POLST on file  No HCD.  Cristal tells me she would want daughter Court \"Alexandra\" to act as her surrogate decisiona maker supported by her siblings (Cristla's children).  Court has also been surrogate decision maker at SNF.  Recommend attempting short form HCD just designating health care agent during this hospital stay and having notarized.  Cristal has partial capacity and I think she has capacity to name her surrogate/agent.    Support: Jainism, active Orthodoxy and spiritual life.  Welcomes spiritual care support.  Five children (Jonas and Camron, sons; daughters April, Court, and Valerie)    Symptom assessment: Palliative care was not consulted for symptom management.  Pain: has oxycodone ordered PRN--agree with this.  Could consider use of oxycodone liquid (using 3mg q4H prn) once nasal feeding tube in place.  Multimodal treatments: APAP, duloxetine, voltaren gel (agree with all)  Consider acupuncture, healing arts, lidocaine ointment, heat (aquaK pad)    Neck tightness/stiffness: methocarbamol ordered (monitor for potential for anticholinergic effects w delirium)     Thank you for the opportunity to participate in the care of this patient and family. Our team: will continue to follow.   During regular M-F work hours -- if you are not sure who specifically to contact, may contact our service via the [facility] Palliative Medicine pager: [phone]  (Please use AMION for text paging if possible, use link under Palliative service)   [facility] Palliative Department voice mail (checked M-F 8a-4pm approx): [phone]        Assessments:  Cristal Preciado is a 72 year old female with CAD, cognitive impairment but not diagnosed with dementia prior to admission and multiple episodes of acute encephalopathy, visual impairment (glaucoma and diabetic retinopathy), IDDM2 w " neuropathy, retinopathy, nephropathy, GERD< HTN, HLD, prior stroke (left her wheelchair bound), major depression, CKD2, semi-recent Covid infection (12/29/22).    Crist[place].  She has had a longstanding sacral decubitus ulcer and presented with infected ulcer and concern for necrotizing fasciitis based on elevated lactic acid, leukocytosis, etc; she underwent debridement on 2/17/23 with Dr Ragsdale.  Over the past week, Cristal has refused to eat much and is not meeting nutritional needs.      Today, the patient was seen for:  Introduction to palliative care, establish rapport with Cristal and her children.    Prognosis, Goals, & Planning:      Functional Status just prior to hospitalization:  Palliative Performance Score:       20%- 1. Totally bed bound; 2. Unable to do any activity, extensive disease; 3. Total care; 4. Minimal to sips; 5. Full or drowsy +/- confusion       Prognosis, Goals, and/or Advance Care Planning were addressed today: Yes        Summary/Comments: only partially.  Unable to discuss advanced care planning and prognosis; reviewed goals with Camron.      Patient's decision making preferences: shared with support from loved ones          Patient has decision-making capacity today for complex decisions: Partial (needs assistance with complex decisions)  I believe that Cristal can express preferences, and refuse treatments, but complex decisions need support from her children.            I have concerns about the patient/family's health literacy today: Yes           Patient has a completed Health Care Directive: No.       Code status: Full Code    Coping, Meaning, & Spirituality:   Mood, coping, and/or meaning in the context of serious illness were addressed today: Yes  Summary/Comments:  Latter day jerod, welcomes spiritual care.  Time with her children is most important to her at this time.    Social:      Living situation: resident at [facility] since December  2022; previously was at [facility] from 2011.    Key family / caregivers: son, Camron, daughters Shantelle (Alexandra),Valerie, April and son Jonas.  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    Financial concerns: not discussed.    History of Present Illness:  History gathered today from: patient, family/loved ones, medical chart, medical team members, outside records including Care Everywhere    Cristal Preciado is a 72 year old female with CAD, cognitive impairment but not diagnosed with dementia prior to admission and multiple episodes of acute encephalopathy, visual impairment (glaucoma and diabetic retinopathy), IDDM2 w neuropathy, retinopathy, nephropathy, GERD< HTN, HLD, prior stroke (left her wheelchair bound), major depression, CKD2, semi-recent Covid infection (12/29/22).    Cristal moved into a new SNF ([facility] in December.  She has had a longstanding sacral decubitus ulcer and presented with infected ulcer and new greater trochanteric ulcer and concern for necrotizing fasciitis based on elevated lactic acid, leukocytosis, etc; she underwent debridement on 2/17/23 with Dr Ragsdale.  Over the past week, Cristal has refused to eat much and is not meeting nutritional needs with current intake.      I introduced Palliative Care as a specialty that works to help patients with serious illness live as well as possible, and manage symptoms/side effects of the illness or its treatment.  Palliative medicine also works to provide an extra layer of support to patients experiencing serious illness and their families.  Our specialty also helps with advance care planning (making health care directives and helping a person receive care that is in keeping with their individual hopes and values).     Cristal noted what's most important to her at this time are her children and grandchildren, and having time with them.    Cristal was unable to clearly express understanding  "of what's happening to her medically currently.  She is aware of her decubitus wounds to a degree.  Cristal affirmed she doesn't feel like eating.  She clearly told me that she isn't hungry, she doesn't have any pain with eating or swallowing, but that she doesn't want to eat.  She denied feeling depressed.    I noted she is currently not eating enough calories to enable her to heal her wounds.  I asked Cristal if she would be willing to have a nasal feeding tube placed in order for her to get calories and macronutrients to hopefully be able to heal her wound.  She strongly said she DEFINITELY WOULD NOT WANT A NASAL FEEDING TUBE.  I asked if she would be willing to have a PEGJ placed, in which a tube would be placed into the stomach through the abdominal wall, for her to have nutrition provided that way.  She said she was not sure and that I should speak with her children.    Chart review shows Cristal is having regular (nearly daily) bowel movements.      I had phone calls with Camron and a face to face visit  with Camron, and attempted to reach Court via telephone but unable to leave message and unable to speak with her.  Camron noted Court is home-schooling her daughter and unable to come into the hospital until after 3.  The writer stopped by at 3:15 and no family members were in Cristal's room.      Children's understanding of medical situation: Son Camron aware that Cristal is not eating adequate calories.  He notes his sister Court \"Alexandra\" had already spoken with hospitalist provider and provided assent to placement of [place] for nutritional support.  The writer was unable to speak with Court to follow up.  Camron did not wish to discuss goals of care, etc without Court present.    Late today I was abl to speak with Court; she is aware that things have been changing for Cristal's functional status since December 2022.  Cristal had been able to call and arrange transport for visits to Court's home prior to " December, but after she got delirious with Covid infection, she hasn't been as clear cognitively since but has good and bad days.  Not diagnosed with dementia.  Court hopes that Cristal could get back to her prior level of function but worries that she might not.  I recommended family meeting and Court is open to this, and open to discussing further about PEG risks/benefits, duration of artificial nutrition, and goals of treatment.  We reviewed that if Cristal refused artificial nutrition, then goals of treatment would likely need to shift to comfort focus and it may be helpful to talk about that ahead of time, just for informational purposes to prepare.  Court affirmed she's been wondering some about those topics.  She would also like to try to complete HCD during this hospital stay, had been trying to do so at the SNF.      Key Palliative Symptom Data:  # Pain severity the last 12 hours: none  # Dyspnea severity the last 12 hours: none  # Nausea severity the last 12 hours: none  # Anxiety severity the last 12 hours: low    ROS:  Comprehensive ROS is reviewed and is negative except as here & per HPI: denies lightheadedness.  Notes no cough.  No pain with swallowing, no pain in her mouth.       Past Medical History:  Past Medical History:   Diagnosis Date     AION (anterior ischaemic optic neuropathy), left eye     NAION LE     CAD (coronary artery disease) 3/2009    [facility]; Angio 2013 [facility]- normal coronary arteries     Cataract      CVA (cerebral vascular accident) (H)     admitted at [facility]     Depression     on Cymbalta     Diabetes mellitus, type 2 (H)      Diabetic nephropathy (H)      Diabetic neuropathy (H)     severe     Diabetic retinopathy (H)      GERD (gastroesophageal reflux disease)      Hyperlipidemia      Hypertension     ECHO 2013, TDS, NL EF     Infection due to 2019 novel coronavirus 1/4/2023     POAG (primary open-angle glaucoma)     adv BE     Seasonal allergies      Tubular  adenoma of colon 2013    repeat colonoscopy in 2018        Past Surgical History:  Past Surgical History:   Procedure Laterality Date     CARDIAC CATHETERIZATION       CATARACT EXTRACTION W/ INTRAOCULAR LENS IMPLANT Bilateral       SECTION        SECTION       COLONOSCOPY  7/15/2013    Tubular adenoma; repeat in 2018;Procedure: COMBINED COLONOSCOPY, SINGLE BIOPSY/POLYPECTOMY BY BIOPSY;;  Surgeon: Don King MD;  Tubular adenoma     COLONOSCOPY N/A 2020    Procedure: COLONOSCOPY;  Surgeon: Sid Amanda MD;  Location: [facility] GI     CORONARY STENT PLACEMENT  2004    RCA     DAVINCI HYSTERECTOMY TOTAL, BILATERAL SALPINGO-OOPHORECTOMY, COMBINED N/A 2019    Procedure: DaVinci Assisted Total Laparoscopic Hysterectomy, Removal Of Both Tubes And Ovaries;  Surgeon: Linh Cardoso MD;  Location: [facility] OR     EXTRACAPSULAR CATARACT EXTRATION WITH INTRAOCULAR LENS IMPLANT  11-10-09, 2-9-10    11-10-09 Lt, 2-9-10 Rt; Left eye 2012     HYSTERECTOMY TOTAL ABDOMINAL, BILATERAL SALPINGO-OOPHORECTOMY, COMBINED  2019    Procedure: DaVinci Assisted Total Laparoscopic Hysterectomy, Removal Of Both Tubes And Ovaries; Surgeon: Linh Cardoos MD; Location: [facility] OR       IRRIGATION AND DEBRIDEMENT TRUNK, COMBINED Right 2023    Procedure: DEBRIDEMENT OF SACRAL ULCER,  DEBRIDEMENT OF RIGHT HIP ULCER;  Surgeon: Shawna Ragsdale MD;  Location: [facility] OR     PICC TRIPLE LUMEN PLACEMENT  2023          STENT, CORONARY, DELORES  2009    RCA         Family History:  Family History   Problem Relation Age of Onset     Hypertension Mother      Cerebrovascular Disease Mother      Glaucoma Father      Cancer Father      Diabetes Sister      Glaucoma Sister      Cancer - colorectal Other      Cerebrovascular Disease Other      Skin Cancer No family hx of      Melanoma No family hx of      Anesthesia Reaction No family hx of      Deep Vein Thrombosis (DVT) No family hx of      Arthritis  Brother      Diabetes Sister      Glaucoma Sister           Allergies:  Allergies   Allergen Reactions     Dust Mites Other (See Comments)     Sneezing runny eyes and nose.     Food Allergy Formula Hives     Mountain Dew and Walnuts     Pollen Extract Other (See Comments)     Sneezing runny eyes and nose.        Medications:  I have reviewed this patient's medication profile and medications from this hospitalization.   Noted:  Current Facility-Administered Medications   Medication     0.9% sodium chloride BOLUS     acetaminophen (TYLENOL) tablet 650 mg     atorvastatin (LIPITOR) tablet 80 mg     dextrose 5% lactated ringers 1,000 mL with potassium chloride 20 mEq infusion     glucose gel 15-30 g    Or     dextrose 50 % injection 25-50 mL    Or     glucagon injection 1 mg     diclofenac (VOLTAREN) 1 % topical gel 2 g     dorzolamide-timolol (COSOPT) ophthalmic solution 1 drop     DULoxetine (CYMBALTA) DR capsule 90 mg     insulin aspart (NovoLOG) injection (RAPID ACTING)     [START ON 2/25/2023] insulin glargine (LANTUS PEN) injection 8 Units     latanoprost (XALATAN) 0.005 % ophthalmic solution 1 drop     lidocaine (LMX4) cream     lidocaine 1 % 0.1-1 mL     [Held by provider] liraglutide (VICTOZA) injection 1.2 mg     melatonin tablet 1 mg     methocarbamol (ROBAXIN) tablet 500 mg     metoprolol tartrate (LOPRESSOR) tablet 50 mg     naloxone (NARCAN) injection 0.2 mg    Or     naloxone (NARCAN) injection 0.4 mg    Or     naloxone (NARCAN) injection 0.2 mg    Or     naloxone (NARCAN) injection 0.4 mg     nitroGLYcerin (NITROSTAT) sublingual tablet 0.4 mg     olopatadine (PATANOL) 0.1 % ophthalmic solution 1 drop     oxyCODONE IR (ROXICODONE) half-tab 2.5 mg     pantoprazole (PROTONIX) EC tablet 40 mg     Patient is already receiving mechanical prophylaxis     piperacillin-tazobactam (ZOSYN) 3.375 g vial to attach to  mL bag     polyvinyl alcohol (LIQUIFILM TEARS) 1.4 % ophthalmic solution 1 drop      "senna-docusate (SENOKOT-S/PERICOLACE) 8.6-50 MG per tablet 2 tablet     simethicone (MYLICON) chewable tablet 80 mg     sodium chloride (PF) 0.9% PF flush 10-30 mL     sodium chloride (PF) 0.9% PF flush 3 mL     sodium chloride (PF) 0.9% PF flush 3 mL     traZODone (DESYREL) tablet 50 mg     24-hrMME: 5mg oxycodone = 6.125 OME    PERTINENT PHYSICAL EXAMINATION:  Vital Signs: Blood pressure 138/61, pulse 64, temperature 97.6  F (36.4  C), temperature source Oral, resp. rate 16, weight 66.8 kg (147 lb 4.3 oz), SpO2 99 %, not currently breastfeeding.   GENERAL: alert, oriented to self.  Laying on L side.    SKIN: Warm and dry   HEENT: Normocephalic, anicteric sclera, moist mucous membranes, temporal muscle wasting noted.  Oropharynx: edentulous; tongue with whitish coating but oral mucosa noninflamed, no adherent plaque or abnormality.  No evidence of thrush.  No pooling of secretions.  LUNGS: Clear to auscultation anterolaterally; non-labored   CARDIAC: RRR, normal s1/s2, w/o m/r/g   ABDOMINAL: BS (+), soft, non distended, non tender  EXTREMITIES: moves arms.  NEUROLOGIC: no tremor.  Not [place].  Speech fluent.  PSYCH: irritable after conversation (following 10 minutes of conversation, Cristal notes \"I'm done--let me rest!\" and declines to pursue any further examination or discussion.    Data reviewed:  Recent imaging reviewed, my comments on pertinents:   CT pelvis soft tissue w conrast 2/16/23:  FINDINGS: Mild motion artifact.     PELVIC ORGANS: Increased circumferential bladder wall thickening measuring up to 0.8 cm is consistent with cystitis. Hysterectomy. Atherosclerotic vascular disease.     MUSCULOSKELETAL: A new sacral decubitus ulcer with subcutaneous emphysema extending into the surrounding soft tissues with associated inflammatory changes. A new decubitus ulcer at the right greater trochanter with surrounding inflammatory changes. No   loculated drainable fluid collection. No aggressive osseous erosion. " Degenerative changes of the spine and hips.                                                                  IMPRESSION:  1.  New sacral decubitus ulcer with subcutaneous emphysema extending into the surrounding soft tissues. This could represent necrotizing fasciitis.  2.  A new right greater trochanter decubitus ulcer.  3.  Cystitis.    Recent lab data reviewed, my comments on pertinents:   Last Comprehensive Metabolic Panel:  Lab Results   Component Value Date     (L) 02/22/2023    POTASSIUM 4.4 02/24/2023    CHLORIDE 105 02/22/2023    CO2 23 02/22/2023    ANIONGAP 7 02/22/2023     (H) 02/24/2023    BUN 8.0 02/22/2023    CR 0.76 02/22/2023    GFRESTIMATED 84 02/22/2023    OLAF 8.0 (L) 02/22/2023       CBC RESULTS: Recent Labs   Lab Test 02/23/23  0523   WBC 6.2   RBC 2.94*   HGB 8.4*   HCT 27.4*   MCV 93   MCH 28.6   MCHC 30.7*   RDW 15.9*        Lab Results   Component Value Date    AST 31 02/23/2023    AST 35 03/06/2014     Lab Results   Component Value Date    ALT 17 02/23/2023    ALT 39 10/12/2015     Lab Results   Component Value Date    BILICONJ 0.0 11/17/2010      Lab Results   Component Value Date    BILITOTAL 0.5 02/23/2023    BILITOTAL 0.2 03/06/2014     Lab Results   Component Value Date    ALBUMIN 0.9 02/23/2023    ALBUMIN 3.3 03/08/2021    ALBUMIN 4.0 03/06/2014     Lab Results   Component Value Date    PROTTOTAL 4.4 02/23/2023    PROTTOTAL 7.6 03/06/2014      Lab Results   Component Value Date    ALKPHOS 173 02/23/2023    ALKPHOS 127 03/06/2014     R hip abscess: bacteroides ovatus/xylanisolvens    Aerobic abscess culture: 2+ e coli, 2+ proteus, 1+ enterococcus    Total time 70 minutes in chart review, discussion with referring hospitalist, multiple visits to patient room, phone calls, documentation.    Michelle Fontanez MD  [facility] Palliative Medicine Service: [facility]  Department voice mail (checked M-F 8a-4pm approx): [phone]  [facility] Palliative Medicine  pager: [phone]  (Please use [employer] for text paging if possible, use link under Palliative service)  Or may securely message me via Vocera Web Console

## 2023-02-24 NOTE — PROGRESS NOTES
"Care Management Follow Up    Length of Stay (days): 8    Expected Discharge Date: 02/28/2023     Concerns to be Addressed: discharge planning , palliative following , may need g tube placed    Patient plan of care discussed at interdisciplinary rounds: Yes    Anticipated Discharge Disposition: Long Term Care     Anticipated Discharge Services: Other (see comment) (nursing care services, assistance with ADLs, care and supervision)  Anticipated Discharge DME: None    Patient/family educated on Medicare website which has current facility and service quality ratings: yes  Education Provided on the Discharge Plan:    Patient/Family in Agreement with the Plan: yes    Referrals Placed by CM/SW: Post Acute Facilities  Private pay costs discussed: Not applicable    Additional Information:  Chart reviewed.    Background:  From [facility]. Family does not want pt to return to [facility]- [place] [facility] Pt will need to return to [facility] and work on different placement as outpatient. Will need IV Abx and possibly wound vac. Transport TBD . Son Camron and dtr Alexandra involved      CM updates:  Per pts daughter Court, and son Camron they do not want pt to return to [facility]. Court stated, \"my mom will not be returning to that facility, we have law suites against them, and called the police to go in there.\"    RNCM spoke to Adelaida Supervisor at [facility], she stated, \" Cristal has always stated she wants to be here with her daughter April, and has not been upset about the care received.\" Adelaida also stated, \"we have had multiple different care conferences with Cristal, and have tried contacting the family on numerous occasions, they never call back, and if they do it is to tell us they are sueing the facility.\" Per Adelaida, \"the health department did come in this last week regarding pt and the complaint filed, but were unable to see anything wrong with pt's care.\" In the past Cristal has expressed her wishes to not eat, due to " "having pureed diet stating, \"I do not eat baby food, I have had babies, I do not want that.\" Adelaida also tried calling the family to talk about possible hospice for pt, they also never called back.       Adelaida Supervisor at [facility] #[phone]    RNCM went in pts room to talk about this with pt. Pt was sleeping. CM to readdress when appropriate.       Palliative following, per note pts wishes for them to speak to children before deciding on tube feedings.       CM will continue to follow plan of care, review recommendations, and assist with any discharge needs anticipated.       Jessica Wilkerson RN        "

## 2023-02-24 NOTE — PLAN OF CARE
Problem: Pain Acute  Goal: Optimal Pain Control and Function  Outcome: Progressing     Problem: Plan of Care - These are the overarching goals to be used throughout the patient stay.    Goal: Optimal Comfort and Wellbeing  Outcome: Progressing   Goal Outcome Evaluation:       Pt is alert but confused, wound vac on pressure at 125, bowel and bladder incontinence,  bedding's changed, pure wick on, pt is refusing to be repositioned, but it was done any way, D5 LR running, pt has been drowsy all night with no signs of pain, no prn given.

## 2023-02-25 PROBLEM — L89.44: Status: ACTIVE | Noted: 2023-01-01

## 2023-02-25 NOTE — SIGNIFICANT EVENT
"Writer in room to assist with cares. Supervisor present in room explaining to pt's son, Camron, policy on visiting hours and son's concern about staff member.ANS informed pt's son she would talk to staff member. Son defensive about being asked to leave. Swearing and mumbling under his breath. Writer heard son say several times \"I'll be back to get you mama. I'm going to get you out of here\". He also made threatening comments towards ANS saying \"I'll be back tomorrow, just wait and see what happens\". He was angrily grabbing his personal belongings in the room and repeating the above statements. ANS asked him if he was making a threat and he was mumbling under his breath. ANS explained to him [place] takes verbal threats seriously and would be asked to leave facility and escorted by Security.  "

## 2023-02-25 NOTE — PLAN OF CARE
"  Problem: Plan of Care - These are the overarching goals to be used throughout the patient stay.    Goal: Plan of Care Review  Description: The Plan of Care Review/Shift note should be completed every shift.  The Outcome Evaluation is a brief statement about your assessment that the patient is improving, declining, or no change.  This information will be displayed automatically on your shift note.  Outcome: Progressing  Goal: Patient-Specific Goal (Individualized)  Description: You can add care plan individualizations to a care plan. Examples of Individualization might be:  \"Parent requests to be called daily at 9am for status\", \"I have a hard time hearing out of my right ear\", or \"Do not touch me to wake me up as it startles me\".  Outcome: Progressing  Goal: Absence of Hospital-Acquired Illness or Injury  Outcome: Progressing  Intervention: Identify and Manage Fall Risk  Recent Flowsheet Documentation  Taken 2/25/2023 0400 by Fara Mason RN  Safety Promotion/Fall Prevention: activity supervised  Intervention: Prevent Skin Injury  Recent Flowsheet Documentation  Taken 2/25/2023 0400 by Fara Mason RN  Body Position:   turned   left  Goal: Optimal Comfort and Wellbeing  Outcome: Progressing  Intervention: Provide Person-Centered Care  Recent Flowsheet Documentation  Taken 2/25/2023 0400 by Fara Mason RN  Trust Relationship/Rapport:   care explained   reassurance provided  Goal: Readiness for Transition of Care  Outcome: Progressing     Problem: Risk for Delirium  Goal: Optimal Coping  Outcome: Progressing  Goal: Improved Behavioral Control  Outcome: Progressing  Intervention: Minimize Safety Risk  Recent Flowsheet Documentation  Taken 2/25/2023 0400 by Fara Mason RN  Enhanced Safety Measures: bed alarm set  Trust Relationship/Rapport:   care explained   reassurance provided  Goal: Improved Attention and Thought Clarity  Outcome: Progressing  Intervention: Maximize Cognitive Function  Recent Flowsheet " Documentation  Taken 2/25/2023 0400 by Fara Mason RN  Sensory Stimulation Regulation: television on  Reorientation Measures: clock in view     Problem: Pain Acute  Goal: Optimal Pain Control and Function  Outcome: Progressing  Intervention: Prevent or Manage Pain  Recent Flowsheet Documentation  Taken 2/25/2023 0400 by Fara Mason RN  Sensory Stimulation Regulation: television on  Medication Review/Management: medications reviewed     Problem: Electrolyte Imbalance  Goal: Electrolyte Imbalance: Plan of Care  Outcome: Progressing     Problem: Diabetes Comorbidity  Goal: Blood Glucose Level Within Targeted Range  Outcome: Progressing     Problem: Violence Risk or Actual  Goal: Anger and Impulse Control  Outcome: Progressing  Intervention: Minimize Safety Risk  Recent Flowsheet Documentation  Taken 2/25/2023 0400 by Fara Mason RN  Sensory Stimulation Regulation: television on  Enhanced Safety Measures: bed alarm set     Problem: Oral Intake Inadequate  Goal: Improved Oral Intake  Outcome: Progressing   Goal Outcome Evaluation:  Pt slept on and off overnight, awake with cares. Pt complained of pain 10/10 at 0600, prn oxycodone given which was helpful. Pt repositioned as needed.

## 2023-02-25 NOTE — PLAN OF CARE
"Goal Outcome Evaluation:                      Problem: Plan of Care - These are the overarching goals to be used throughout the patient stay.    Goal: Optimal Comfort and Wellbeing  Outcome: Progressing   Patient has been turned and repositioned several times this shift and during the day. She favors her left side, she has been encouraged to try and lay in different positions. Offered to get the patient to the recliner several times today, she would agree to the move but then would decline. She finally did agree to get to the chair once, but only sat in the chair for about 5 minutes-just long enough to get the linen in the bed changed. She reported that she was just too uncomfortable. Offered medication, again she refused. Did agree to have voltaren cream applied as ordered.     Problem: Pain Acute  Goal: Optimal Pain Control and Function  Outcome: Progressing   Reports she has pain \"all over\"    Problem: Diabetes Comorbidity  Goal: Blood Glucose Level Within Targeted Range  Outcome: Progressing   Blood sugar has been slightly elevated today, even though patient has not eaten anything. Insulin has been given per order.     Problem: Oral Intake Inadequate  Goal: Improved Oral Intake  Outcome: Progressing   She has not taken in much liquids today, would only take a few bites of shebert and then her daughter did come in this evening, she was able to get 1/2 cup of the magic cup.  Another daughter called and said she was bringing in soup and crackers-she is not here yet.     Problem: Wound Healing Progression  Goal: Optimal Wound Healing  Outcome: Progressing   Wound vac is still present at the left buttock/sacral area. Little output.  Heels are still black, lotion was applied to the feet and patient's own rooke boots have been applied. She reports she feels comfortable at this time.      "

## 2023-02-25 NOTE — SIGNIFICANT EVENT
RN assigned to patient came from room told writer patient's son was arguing with staff. Writer called NS and security, who came and addressed issue.

## 2023-02-25 NOTE — PLAN OF CARE
"Patient was taken to interventional radiology at the beginning of the shift for NG placement. Patient was brought back to the unit without getting the tube placement due to patient refusal to placing tube. Son in room upset that patient was not sedated/put to sleep for the procedure. Explained to patient's son that it is not a standard practice to put patients to sleep for NG tube placement and more so without signed consent. Patient ate a cup of the mandarin fruit out of her supper tray. She took her medications crushed in pudding. Patient says \"No\" a lot with cares and always hesitant to cares. See chart for blood sugars. Patient is incontinent of bowel and bladder, PureWick in use. Incontinent of stool.  I.V D5LA infusing at 75 ml/hr. VAC dressing intact.  Problem: Plan of Care - These are the overarching goals to be used throughout the patient stay.    Goal: Optimal Comfort and Wellbeing  Intervention: Monitor Pain and Promote Comfort  Recent Flowsheet Documentation  Taken 2/24/2023 1719 by Joanna Webster RN  Pain Management Interventions: medication (see MAR)  Intervention: Provide Person-Centered Care  Recent Flowsheet Documentation  Taken 2/24/2023 1600 by Joanna Webster RN  Trust Relationship/Rapport:   care explained   emotional support provided   questions encouraged   reassurance provided   thoughts/feelings acknowledged     Problem: Pain Acute  Goal: Optimal Pain Control and Function  Outcome: Progressing  Intervention: Develop Pain Management Plan  Recent Flowsheet Documentation  Taken 2/24/2023 1719 by Joanna Webster RN  Pain Management Interventions: medication (see MAR)  Intervention: Prevent or Manage Pain  Recent Flowsheet Documentation  Taken 2/24/2023 1600 by Joanna Webster RN  Sensory Stimulation Regulation: television on  Medication Review/Management: medications reviewed     Problem: Diabetes Comorbidity  Goal: Blood Glucose Level Within Targeted Range  Outcome: Progressing   "       Goal Outcome Evaluation:

## 2023-02-25 NOTE — PROGRESS NOTES
[facility]    Medicine Progress Note - Hospitalist Service    Date of Admission:  2/16/2023    Assessment & Plan     Cristal Preciado is a 72 year old female with h/o sacral decubitus ulcer (present on admission), CVA, dementia, neuropathy, DM2, CKD, CAD, failure to thrive admitted on 2/16/2023 with infected new sacral decubitus ulcer with possible necrotizing fasciitis.      Severe sepsis secondary to infected stage IV sacral and right greater trochanter decubitus ulcers, necrotizing fasciitis:  On admission, sepsis criteria met with leukocytosis (WBC 14.1), tachycardia and lactic acidosis (Lactic acid 6.4). Source being infected infected stage IV sacral and right greater trochanter decubitus ulcers.   Received I&D by Dr. Ragsdale on 2/17. Found gross necrosis of the subcutaneous tissues and then fascia extending all the way down to the sacrum bone.  Cultures growing Proteus, E.coli and Enterococcus. Blood culture has no growth.   - ID consulted and input appreciated. Initially treated with Vancomycin, clindamycin and Zosyn. Now on Zosyn per culture sensitivity. Per ID, plan for 4-6 weeks of IV Zosyn. PICC line in right arm  - Patient has a wound VAC for right IT wound and sacrococcygeal wound. Continue wound care.     Moderate Malnutrition: as determined by dietitian, see their notes for details. Has been eating little or none since admission. H/o dysphagia and son is not certain of exact diet but he is aware she's on a modified diet, but uncertain of type. Brother recently passed away, which affects her mood to eat.  -  Regarding tube feed, patient defers the decision to her children. The initial plan was to place a temporary NJ feeding tube until the family decide on PEG tube. Discussed with her daughter Court today, she would like to hold on the NJ tube placement. She states that patient has shown signs of increased oral intake when family bring in food from home. Family will have a  conference about PEG placement Next Monday or Tuesday. Discussed with nursing staff to document  - Palliative care consulted for goal of care     Type II DM: HgbA1c on 01/03/2023 is 7.6 %. Patient is refusing to eat and has had numerous hypoglycemic episodes  - Lantus reduced to 5 units subcu every morning and patient started on D5 LR with 20 mEq of potassium chloride IV fluids  - PTA Victoza and metformin on hold  - Continue Accucheck, Insulin sliding scale and hypoglycemic protocol     HTN: Blood pressure is well controlled. Continue PTA metoprolol      Acute blood loss anemia: Hgb was 5.7 after surgery. Transfused 2 units of packed RBCs  - Continue to monitor and transfuse to keep Hgb > 7. Hemoglobin has been fairly stable     UTI: on Zosyn as above. Urine culture grew mixed urogenital brittani     Hyponatremia: Mild. Likely due to hypovolemia and poor oral intake. Resolved.      Hypercalcemia: resolved may have been due to dehydration     Hypokalemia: replacement protocol      Hypomagnesemia: Replace per protocol     Heel ulcers assessed by wound nurse   - continue to follow recommendations          Diet: Combination Diet Moderate Consistent Carb (60 g CHO per Meal) Diet; Soft and Bite Sized Diet (level 6); Thin Liquids (level 0)    DVT Prophylaxis: Pneumatic Compression Devices  Cornejo Catheter: Not present  Lines: PRESENT      PICC 02/17/23 Triple Lumen Right Basilic Vascular access-Site Assessment: WDL      Cardiac Monitoring: None  Code Status: Full Code      Clinically Significant Risk Factors            # Hypomagnesemia: Lowest Mg = 1.2 mg/dL in last 2 days, will replace as needed   # Hypoalbuminemia: Lowest albumin = 0.9 g/dL at 2/23/2023  7:22 AM, will monitor as appropriate          # DMII: A1C = 7.6 % (Ref range: <5.7 %) within past 6 months    # Moderate Malnutrition: based on nutrition assessment        Disposition Plan     Expected Discharge Date: 02/28/2023    Discharge Delays: Placement - LTC  IV  Medication - consider oral or Home Infusion  Procedure Pending (enter procedure & time in comments)  Destination: long-term care facility  Discharge Comments: needs tube feeding,   wants new LTC          Pascual Corea MD  Hospitalist Service  [facility]  Securely message with .com (more info)  Text page via [employer] Paging/Directory   ______________________________________________________________________    Interval History   Patient is new to me. Previous progress notes reviewed.   Overnight events noted.  When seen this morning, patient was upset and asked to be left alone.     Physical Exam   Vital Signs: Temp: 97.7  F (36.5  C) Temp src: Oral BP: 98/46 Pulse: 58   Resp: 18 SpO2: 99 % O2 Device: None (Room air)    Weight: 147 lbs 4.28 oz    General appearance: not in acute distress  HEENT: PERRL, EOMI  Lungs: Clear breath sounds in bilateral lung fields  Cardiovascular: Regular rate and rhythm, normal S1-S2  Abdomen: Soft, non tender, no distension  Musculoskeletal: No joint swelling  Skin: Wound vac on the sacrum and right hip. Black pressure ulcers on bilateral heels.  Neurology: Lethargy.  Cranial nerves II - XII normal. No movement of her legs observed today. Moving her bilateral arms.      Medical Decision Making       45 MINUTES SPENT BY ME on the date of service doing chart review, history, exam, documentation & further activities per the note.      Data     I have personally reviewed the following data over the past 24 hrs:    N/A  \   N/A   / N/A     N/A N/A N/A /  217 (H)   4.3 N/A 0.66 \       Imaging results reviewed over the past 24 hrs:   No results found for this or any previous visit (from the past 24 hour(s)).

## 2023-02-25 NOTE — PROGRESS NOTES
"Care Management Follow Up     Length of Stay (days): 9     Expected Discharge Date: 02/28/2023     Concerns to be Addressed: discharge planning , palliative following , may need g tube placed    Patient plan of care discussed at interdisciplinary rounds: Yes     Anticipated Discharge Disposition: Long Term Care     Anticipated Discharge Services: Other (see comment) (nursing care services, assistance with ADLs, care and supervision)  Anticipated Discharge DME: None     Patient/family educated on Medicare website which has current facility and service quality ratings: yes  Education Provided on the Discharge Plan:    Patient/Family in Agreement with the Plan: yes     Referrals Placed by CM/SW: Post Acute Facilities  Private pay costs discussed: Not applicable     Additional Information:  Chart reviewed.     Background:  From [facility]. Family does not want pt to return to [facility]- want her to go to [facility]. Pt will need to return to [facility] and work on different placement as outpatient. Will need IV Abx and possibly wound vac. Transport TBD . Son Camron and dtr Alexadnra involved    CM updates:  2/24 Per pts daughter Court, and son Camron they do not want pt to return to [facility]. Court stated, \"my mom will not be returning to that facility, we have law suits against them, and called the police to go in there.\" RNCM spoke to Adelaida Supervisor at [facility] phone [phone]. She stated patient has expressed she likes living at their facility. The State did come to review patient's care and found no concerns.She further shared that In the past Cristal has expressed her wishes to not eat, due to having pureed diet stating, \"I do not eat baby food, I have had babies, I do not want that.\" Adelaida also tried calling the family to talk about possible hospice for pt, they also never called back.     Palliative following, per note pts wishes for them to speak to children before deciding on tube feedings.     Patient will likely " need transportation on discharge.     CM will continue to follow plan of care, review recommendations, and assist with any discharge needs anticipated.     CHRISTIN Haas

## 2023-02-25 NOTE — SIGNIFICANT EVENT
"I received a call from the charge RN Magdalene re: a visitor in the room arguing with the NA in the room caring for the patient.  Also, to inquire if the visitor had an exception to stay overnight after visiting hours.  Magdalene also informed me she was calling security to be present.    Upon arrival, the patients son Camron was agitated and shouting to me why he was upset.  I spoke with Camron re: his concerns and the name of the staff member involved.  I reassured him that I would address the matter with the NA involved (which I did).  I offered active listening to his concerns and validation to his feelings.  Security was present in the hallway @ this time.    I also informed him that we did not have an exception for him to stay overnight and that he would have to leave and be able to come back at 0800 when visiting hours started.  He willing (but with aggressive behavior) pushing the chair around to gather his things to leave and mumbled don't worry I'm going and not coming back.  I reassured him he could visit @ start of visiting hours when he bent over to address his mother mumroque in a softer tone,  \"mama I'm going to get you outta here, I'll be back to tomorrow then you just wait and see what happens.\"      Upon hearing this I asked Camron if that was a threat?  He continued to be agitated and mumbling and walked out of the room. I informed Camron that his statement would be taken as a threat and he will be trespassed and not be able to return per policy and safety.  That we take every threat seriously.  He left with security on his own without physical interaction but continued to complain in a mumbling fashion.    Afterwards, it was brought to my attention by Delmar FLORES that Camron had told him earlier in the week (Tues 2/21/23) that he was going to \"find him and come after him\".  Delmar was not involved in the patients care @ that time and again this morning Camron pointed @ Delmar in the motley and told " "him \"wait and see what I am going to do to you\".  Security returned to the unit by this time and I informed security that Camron would not be allowed back into the building based on his verbal threats, also informing him of the conversation I had just has with Delmar palacio: threats.  "

## 2023-02-25 NOTE — PROGRESS NOTES
Calorie Count Note    Date of Calorie Count: 2/24    Diet RX:  Consistent Carb (60g/meal), soft and bite sized diet.  Thin liquids.    Supplements were stopped yesterday as patient not taking these and feeding tube was planned.  Noted 3-4 packets of Terrence on bedside table.    Meals Recorded: No meals recorded.  Per RN note patient ate a cup of Mandarin oranges yesterday.  600 ml po per I/Os.    New Findings:  Patient noted with moderate malnutrition.  Patient declined feeding tube yesterday in the IR suite.     Plan:  Patient with increased nutrition needs for wound healing though patient's nutrition needs not met.   Expect continued decline with inadequate nutrition.    Start 100 mg thiamine/day for malnutrition and depletion.    Consider 500 mg vitamin C daily for wound healing and repletion.    Start magic cup 2x/day  If consistent with goals of care consider appetite stimulant, nutrition support.

## 2023-02-26 PROBLEM — E16.2 HYPOGLYCEMIA: Status: ACTIVE | Noted: 2023-01-01

## 2023-02-26 PROBLEM — L89.610 PRESSURE ULCER OF BOTH HEELS, UNSTAGEABLE (H): Status: ACTIVE | Noted: 2023-01-01

## 2023-02-26 PROBLEM — L89.620 PRESSURE ULCER OF BOTH HEELS, UNSTAGEABLE (H): Status: ACTIVE | Noted: 2023-01-01

## 2023-02-26 PROBLEM — M72.6 NECROTIZING FASCIITIS (H): Status: ACTIVE | Noted: 2023-01-01

## 2023-02-26 NOTE — PLAN OF CARE
Problem: Plan of Care - These are the overarching goals to be used throughout the patient stay.    Goal: Absence of Hospital-Acquired Illness or Injury  Intervention: Identify and Manage Fall Risk  Recent Flowsheet Documentation  Taken 2/26/2023 0300 by Fara Mason RN  Safety Promotion/Fall Prevention: bed alarm on  Intervention: Prevent Skin Injury  Recent Flowsheet Documentation  Taken 2/26/2023 0606 by Fara Mason RN  Body Position:   right   turned   supine, head elevated  Taken 2/26/2023 0300 by Fara Mason RN  Body Position:   turned   position changed independently   supine  Goal: Optimal Comfort and Wellbeing  Intervention: Provide Person-Centered Care  Recent Flowsheet Documentation  Taken 2/26/2023 0300 by Fara Mason RN  Trust Relationship/Rapport:   care explained   reassurance provided     Problem: Risk for Delirium  Goal: Improved Behavioral Control  Intervention: Minimize Safety Risk  Recent Flowsheet Documentation  Taken 2/26/2023 0300 by Fara Mason RN  Trust Relationship/Rapport:   care explained   reassurance provided  Goal: Improved Attention and Thought Clarity  Intervention: Maximize Cognitive Function  Recent Flowsheet Documentation  Taken 2/26/2023 0300 by Fara Mason RN  Sensory Stimulation Regulation: television on  Reorientation Measures: clock in view     Problem: Pain Acute  Goal: Optimal Pain Control and Function  Intervention: Prevent or Manage Pain  Recent Flowsheet Documentation  Taken 2/26/2023 0300 by Fara Mason RN  Sensory Stimulation Regulation: television on  Medication Review/Management: medications reviewed     Problem: Violence Risk or Actual  Goal: Anger and Impulse Control  Intervention: Minimize Safety Risk  Recent Flowsheet Documentation  Taken 2/26/2023 0300 by Fara Mason RN  Sensory Stimulation Regulation: television on     Problem: Hypertension Acute  Goal: Blood Pressure Within Desired Range  Intervention: Normalize Blood Pressure  Recent  Flowsheet Documentation  Taken 2/26/2023 0300 by Fara Mason, RN  Sensory Stimulation Regulation: television on  Medication Review/Management: medications reviewed     Problem: Wound Healing Progression  Goal: Optimal Wound Healing  Intervention: Promote Wound Healing  Recent Flowsheet Documentation  Taken 2/26/2023 0300 by Fara Mason, RN  Activity Management:   activity adjusted per tolerance   bedrest   Goal Outcome Evaluation:  Pt complained of pain 10/10 at start of shift, prn oxycodone and trazodone were given. Pt denied pain after and did sleep rest of shift. Pt incontinent of stool and urine and cleaned up and repositioned as needed.

## 2023-02-26 NOTE — PROGRESS NOTES
Pt refused most bedtime medication and only took half her metoprolol. PICC lines flushed well at bedtime.

## 2023-02-26 NOTE — PROGRESS NOTES
[facility]    Medicine Progress Note - Hospitalist Service    Date of Admission:  2/16/2023    Assessment & Plan     Cristal Preciado is a 72 year old female with h/o bedbound nursing home resident, sacral decubitus ulcer (present on admission), CVA, dementia, neuropathy, DM2, CKD, CAD, failure to thrive admitted on 2/16/2023 with infected new sacral decubitus ulcer with possible necrotizing fasciitis.      Severe sepsis secondary to infected stage IV sacral and right greater trochanter decubitus ulcers, necrotizing fasciitis:  On admission, sepsis criteria met with leukocytosis (WBC 14.1), tachycardia and lactic acidosis (Lactic acid 6.4). Source being infected infected stage IV sacral and right greater trochanter decubitus ulcers.   Received I&D by Dr. Ragsdale on 2/17. Found gross necrosis of the subcutaneous tissues and then fascia extending all the way down to the sacrum bone.  Cultures growing Proteus, E.coli and Enterococcus. Blood culture has no growth.   - ID consulted and input appreciated. Initially treated with Vancomycin, clindamycin and Zosyn. Now on Zosyn per culture sensitivity. Per ID, plan for 4-6 weeks of IV Zosyn. PICC line in right arm.  - Wound VAC placed. Continue wound care.     Moderate malnutrition, weight loss, failure to thrive:   Malnutrion status as determined by dietitian, see their notes for details. Patient was admitted from 2/1-2/3/23 for evaluation of weight loss, failure to thrive and decreased appetite. GI was consulted. Per consult note on 2/323, the above issues are related to patient's multiple comorbidities and age rather than an acute GI process. Family demanded an EGD to be done. Outpatient EGD was scheduled on 3/1/23.  After this admission, has been eating little or none. The etiology is likely multifactorial. Her poor nutrition status affects her wound healing. Has ongoing discussion about tube feed.   - Patient has history of oropharyngeal dysphagia.  Per SLP evaluation on 2/16/23, recommends level 4/pureed textures and thin liquids. Patient wanted to upgrade to soft bite size diet with thin liquid on 2/21. Per family, patient does not like puree diet and that contributes to her poor appetite.   - Per family, patient's mood could easily affect her appetite. Her brother recently passed away, which may contribute to her poor appetite. Recommend to give a trial of Remeron. Her daughter Valerie agrees.  - Patient defers the decision about tube feed to her children. The initial plan was to place a temporary NJ feeding tube until the family make decision on PEG tube. Discussed with her daughter Court and Valerie, they would like to hold on the NJ tube placement. They feel patient has shown signs of increased oral intake when family bring in food from home. Family is planning to bring in patient's favorite food. Family will have a conference about PEG placement Next Monday or Tuesday. Discussed with nursing staff to document the amount of food patient eats. Continue calorie counts   - Per Valerie, she still wants the EGD to be done since patient has a history of ulcer. Will consult GI.     Type II DM with hypoglycemia: HgbA1c on 1/3/2023 was 7.6 %. Patient is refusing to eat and has had numerous hypoglycemic episodes. PTA lantus 20 units QAM and Novolog sliding scale. PTA Victoza and metformin on hold.  - Had hyperglycemia while on D5 drip yesterday. Discontinue D5 drip. Lantus reduced to 5 units subcu every morning. Novolog sliding scale.  - Hypoglycemic protocol     HTN: Blood pressure is well controlled. Continue PTA metoprolol      Acute blood loss anemia: Hgb was 5.7 after surgery. Transfused 2 units of packed RBCs  - Continue to monitor and transfuse to keep Hgb > 7. Hemoglobin has been stable.     UTI: on Zosyn as above. Urine culture grew mixed urogenital brittani     Hyponatremia: Mild. Likely due to hypovolemia and poor oral intake. Resolved.      Hypercalcemia:  resolved may have been due to dehydration     Hypokalemia: replacement protocol      Hypomagnesemia: Replace per protocol     Pressure ulcer on bilateral heels, unstageable: Present on admission. L heel: 4  x 5.5 cm  and R heel: 2  x 2 cm, the skin surface is black.  - Wound care consulted. On offload boots.     History of ulcer, GERD: on PPI.    Hyperlipidemia: on statin.     Goal of care: Per chart review, patient has been a nursing home resident in [facility] since Oct 2011 due to lower extremity weakness from a stroke. She has become bed bound and wheelchair bound for 5 years. Had 4 admissions for the past two months for evaluation of UTI, encephalopathy, malnutrition, and weight loss.   - Palliative care consulted. Plan a family conference within the next 2-3 days.   - Not able to reach her daughter Court today. Able to reach her other daughter Valerie today and had a long discussion. She states that there are some disagreement among the siblings. She is not aware about the care conference next week. Her mother has been making decisions all the time in the past. Valerie states that she has been the primary care giver for her mother for a long time and knows her mother the best. She would like to be the primary contact and patient's primary healthcare representative. Patient's son, Camron, has been banned from the hospital due to threatening behaviors towards staff. Her daughters Court and Valerie have been visiting her.          Diet: Combination Diet Moderate Consistent Carb (60 g CHO per Meal) Diet; Soft and Bite Sized Diet (level 6); Thin Liquids (level 0)  Calorie Counts  Snacks/Supplements Adult: Magic Cup; With Meals    DVT Prophylaxis: Pneumatic Compression Devices  Cornejo Catheter: Not present  Lines: PRESENT      PICC 02/17/23 Triple Lumen Right Basilic Vascular access-Site Assessment: WDL      Cardiac Monitoring: None  Code Status: Full Code      Clinically Significant Risk Factors             # Hypomagnesemia: Lowest Mg = 1.2 mg/dL in last 2 days, will replace as needed   # Hypoalbuminemia: Lowest albumin = 0.9 g/dL at 2/23/2023  7:22 AM, will monitor as appropriate          # DMII: A1C = 7.6 % (Ref range: <5.7 %) within past 6 months    # Moderate Malnutrition: based on nutrition assessment        Disposition Plan     Expected Discharge Date: 02/28/2023    Discharge Delays: Placement - LTC  IV Medication - consider oral or Home Infusion  Procedure Pending (enter procedure & time in comments)  Destination: long-term care facility  Discharge Comments: needs tube feeding,   wants new LTC          Pascual Corea MD  Hospitalist Service  [facility]  Securely message with [employer] (more info)  Text page via [facility] Paging/Directory   ______________________________________________________________________    Interval History   Cristal was awake and alert when seen this morning. She reports having pain all over. She wanted to be turned to her left side. She did not answer questions a lot of times. Her daughter, Court, came see her last night. She fed her 1.5 oz of magic cup, 6 oz chicken noodle soup and 3 oz ginger ale. This morning, she ate a few bites of pudding. Per her daughter Valerie, patient told her what to cook and bring from home. She is hoping patient can eat better.   She sat in the chair briefly yesterday and demanded to go back to the bed.    Physical Exam   Vital Signs: Temp: 97.9  F (36.6  C) Temp src: Oral BP: 133/46 Pulse: 76   Resp: 16 SpO2: 100 % O2 Device: None (Room air)    Weight: 147 lbs 4.28 oz    General appearance: not in acute distress  HEENT: PERRL, EOMI  Lungs: Clear breath sounds in bilateral lung fields  Cardiovascular: Regular rate and rhythm, normal S1-S2  Abdomen: Soft, non tender, no distension, normal bowel sound  Musculoskeletal: No joint swelling  Skin: Wound vac on right sacrum and right hip. Pressure ulcers on left and right heels. L heel 4   x 5.5 cm and R heel: 2  x 2 cm, the skin surface is black.  Neurology: Awake and alert, wound not answer questions  Cranial nerves II - XII normal.  Grossly normal muscle strength of bilateral upper extremities. No movement of bilateral lower extremities observed during this visit.       Medical Decision Making         50 minutes spend by me on the date of service doing chart review, history, exam, documentation & further activities per the note.       Data     I have personally reviewed the following data over the past 24 hrs:    N/A  \   N/A   / N/A     N/A N/A N/A /  159 (H)   4.0 N/A N/A \       Imaging results reviewed over the past 24 hrs:   No results found for this or any previous visit (from the past 24 hour(s)).

## 2023-02-26 NOTE — PLAN OF CARE
"Goal Outcome Evaluation:      Plan of Care Reviewed With: patient    Overall Patient Progress: no changeOverall Patient Progress: no change       Problem: Plan of Care - These are the overarching goals to be used throughout the patient stay.    Goal: Absence of Hospital-Acquired Illness or Injury  Intervention: Prevent Skin Injury  Recent Flowsheet Documentation  Taken 2/26/2023 1429 by Yolanda Ambrosio, RN  Body Position:    left    turned  Taken 2/26/2023 1040 by Yolanda Ambrosio RN  Body Position: (bilateral rooke boots removed for MD to inspect heels/skin)    left    turned    log-rolled    legs elevated    heels elevated  Taken 2/26/2023 1000 by Yolanda Ambrosio, RN  Body Position: (per patient request, turn to the right side.)    right    turned   Has been turned and repositioned in bed about 4 times, changing from left to right side with each turn. Offered to get her out of bed using the lift to the chair several times, she refused and said \"I'll tell you if I'm wanting to get to the chair or not\" with last turn, incontinence care done, lotion applied to lower legs.     Problem: Risk for Delirium  Goal: Optimal Coping  Outcome: Progressing   Is alert to person and place. She wouldn't answer any other orientation questions, \"I'm tired of answering questions, leave me alone\"    Problem: Pain Acute  Goal: Optimal Pain Control and Function  Outcome: Progressing   Reports that she has pain \"all over\" but does admit to pain in the left heel most often. Muscle relaxer given, voltaren cream as ordered. Patient resting well in between cares and turns.     Problem: Diabetes Comorbidity  Goal: Blood Glucose Level Within Targeted Range  Outcome: Progressing   Blood sugar has come down since yesterday. Continue to monitor.    Problem: Oral Intake Inadequate  Goal: Improved Oral Intake  Outcome: Progressing   Very poor intake. Offered foods she likes, she has refused each meal. When food she likes was ordered. " She changed her mind and wouldn't eat. Offered ginger ale, opened the lid and she refused to drink any.  Offered water, ankit, juice, ginger ale. She wouldn't take any.  Only took a few bites of magic cup today and a few bites of pudding this morning with medications.      Problem: Wound Healing Progression  Goal: Optimal Wound Healing  Outcome: Progressing  Intervention: Promote Wound Healing  Recent Flowsheet Documentation  Taken 2/26/2023 1040 by Yolanda Ambrosio, RN  Activity Management:    activity adjusted per tolerance    activity encouraged  Taken 2/26/2023 1000 by Yolanda Ambrosio, RN  Activity Management: activity adjusted per tolerance   Wound vac still in place buttock area. Mepilex also in place over sacrum. Bilateral heels are black, lotion applied to lower legs from knees down. Will put rooke boots back on

## 2023-02-26 NOTE — PROGRESS NOTES
Daughter Alexandra was her last night and helped her mother eat some supper. Alexandra recorded consumption on the patients white board:    1.5 oz of magic cup  6 oz chicken noodle soup  3 oz ginger ale

## 2023-02-26 NOTE — CONSULTS
GASTROENTEROLOGY CONSULTATION      Cristal Toney  C/O KIMBERLY TONEY  [address]  [address]  [place] [address]  72 year old female     Admission Date/Time: 2/16/2023  Primary Care Provider: Aditi Allen     We were asked to see the patient in consultation by Dr. Corea for evaluation of weight loss/malnutrition.    CC: no specific complaints     HPI:  Cristal Toney is a 72 year old female with past medical history significant for CVA, dementia, type 2 diabetes, diabetic neuropathy, CKD, coronary artery disease, bedbound with chronic sacral decubitus ulcer, malnutrition, failure to thrive admitted February 16 with infected sacral decubitus ulcer with possible necrotizing fasciitis, sepsis.    Patient has had evidence of malnutrition as determined by dietitian with failure to thrive.  During previous admission earlier this month, our service was consulted and was felt her weight loss and malnutrition more likely related to multiple comorbidities and age than from than any acute GI process. Family demanded an EGD be done and this was scheduled outpatient on March 1.  On review of her records, patient reports having history of ulcer disease, but has never had an upper endoscopy that I can locate.  Colonoscopy performed for polyp surveillance in January 2020 to the [facility] showing diverticulosis, otherwise normal colon and terminal ileum.     This admission, she has not had any evidence of GI bleeding.  No nausea or vomiting.  Patient reports diffuse abdominal pain but exam benign.  Hemoglobin stable. On review of hospitalist note, there is some consideration for PEG tube placement in the near future.  Family is planning on discussing this further.  Prior to placement, family is requesting EGD be done inpatient. She is getting daily Protonix here. No NSAID use.Not on any blood thinners or ASA.     Speech therapy has seen patient this admission and evaluation consistent with  oropharyngeal dysphagia. She had been on level 4 puree textures/thin liquids but this was upgraded at request of family last week as it was felt to be contributing to poor PO intake.     Had surgery 2/17 for debridement of sacral ulcer with postoperative blood loss/anemia. Transfused with stable hemoglobin since that time.     PAST MEDICAL HISTORY:  Patient Active Problem List    Diagnosis Date Noted     Necrotizing fasciitis (H) 02/26/2023     Priority: Medium     Hypoglycemia 02/26/2023     Priority: Medium     Pressure ulcer of both heels, unstageable (H) 02/26/2023     Priority: Medium     Pressure injury of contiguous region involving buttock and hip, stage 4 (H) 02/25/2023     Priority: Medium     Anemia due to blood loss, acute 02/18/2023     Priority: Medium     Hypokalemia 02/17/2023     Priority: Medium     Hypercalcemia 02/17/2023     Priority: Medium     Wound infection 02/16/2023     Priority: Medium     Sacral wound, initial encounter 02/16/2023     Priority: Medium     Severe sepsis (H) 02/16/2023     Priority: Medium     Protein-calorie malnutrition (H) 02/09/2023     Priority: Medium     Cognitive impairment Consistent with Mild Dementia 02/03/2023     Priority: Medium     Polyneuropathy associated with underlying disease (H) 02/03/2023     Priority: Medium     Adult failure to thrive 02/01/2023     Priority: Medium     Acute kidney injury (H) 02/01/2023     Priority: Medium     Dehydration 01/05/2023     Priority: Medium     Confusion 01/05/2023     Priority: Medium     Acute metabolic encephalopathy 01/04/2023     Priority: Medium     Oropharyngeal dysphagia 01/04/2023     Priority: Medium     Sacral decubitus ulcer 01/04/2023     Priority: Medium     Acute cystitis with hematuria 12/31/2022     Priority: Medium     Delirium 12/31/2022     Priority: Medium     Chronic kidney disease, stage 2 (mild) 05/10/2021     Priority: Medium     Hypomagnesemia 08/23/2019     Priority: Medium     Stage 3  chronic kidney disease, unspecified whether stage 3a or 3b CKD (H)      Priority: Medium     S/P hysterectomy 08/05/2019     Priority: Medium     Acute chest pain 04/24/2018     Priority: Medium     Neoplasm of uncertain behavior of skin 02/25/2018     Priority: Medium     Chronic dermatitis of hands 08/30/2017     Priority: Medium     Gout 12/28/2015     Priority: Medium     DM type 2 w Neuro/Retin/Nephr -- hgb A1C 7.6 on 1/3/23 12/02/2015     Priority: Medium     Venous stasis dermatitis of both lower extremities 11/02/2015     Priority: Medium     History of coronary artery disease 06/10/2015     Priority: Medium     Dermatitis, seborrheic 05/13/2015     Priority: Medium     Eczematous dermatitis 05/13/2015     Priority: Medium     Leg weakness 05/04/2015     Priority: Medium     Tubular adenoma of colon      Priority: Medium     repeat colonoscopy in 2018       Seasonal allergies      Priority: Medium     Depression      Priority: Medium     Anemia 06/13/2012     Priority: Medium     H/O: stroke      Priority: Medium     admitted at [facility]      Priority: Medium     Cataract 04/22/2011     Priority: Medium     Utility update for deleted IMO code  Imo Update utility       CAD (coronary artery disease) 04/22/2011     Priority: Medium     Diabetic nephropathy (H) 04/22/2011     Priority: Medium     Diabetic neuropathy (H) 04/22/2011     Priority: Medium     severe       Diabetic retinopathy (H) 04/22/2011     Priority: Medium     GERD (gastroesophageal reflux disease) 04/22/2011     Priority: Medium     Glaucoma 04/22/2011     Priority: Medium     Hypertension 04/22/2011     Priority: Medium          ROS: A comprehensive ten point review of systems was negative aside from those in mentioned in the HPI.       MEDICATIONS:   Prior to Admission medications    Medication Sig Start Date End Date Taking? Authorizing Provider   acetaminophen (TYLENOL) 325 MG tablet Take 2 tablets (650 mg) by mouth  every 6 hours as needed for mild pain or other (and adjunct with moderate or severe pain or per patient request) 2/3/23  Yes Vu Islas MD   ASPIRIN LOW DOSE 81 MG chewable tablet Take 81 mg by mouth daily 6/12/19  Yes Reported, Patient   atorvastatin (LIPITOR) 80 MG tablet Take 1 tablet by mouth daily. Pt needs to have labs done prior to further refills. 10/13/11  Yes Edward Rose MD   bisacodyl (DULCOLAX) 10 MG suppository Place 10 mg rectally daily as needed for constipation   Yes Reported, Patient   diclofenac (VOLTAREN) 1 % topical gel Apply 2 g topically 4 times daily   Yes Reported, Patient   dorzolamide-timolol (COSOPT) 2-0.5 % ophthalmic solution Place 1 drop into both eyes 2 times daily 6/28/22  Yes Jessica Ramsay MD   DULoxetine (CYMBALTA) 30 MG capsule Take 90 mg by mouth every morning    Yes Reported, Patient   insulin glargine (LANTUS PEN) 100 UNIT/ML pen Inject 20 Units Subcutaneous every morning (before breakfast) 1/8/23  Yes Anjelica Sharif MD   insulin lispro (HUMALOG KWIKPEN) 100 UNIT/ML (1 unit dial) KWIKPEN Inject Subcutaneous 3 times daily (before meals) 140-189=1 unit  190-239= 2 units  240-289= 3 units  290-339= 4 units  340-399= 5 units  400-999= 6 units   Yes Unknown, Entered By History   ketoconazole (NIZORAL) 2 % shampoo To entire wet scalp and ears and then wash off after 5 minutes three times a week. 2/20/18  Yes Rex Rubio MD   latanoprost (XALATAN) 0.005 % ophthalmic solution Place 1 drop into both eyes At Bedtime 6/28/22  Yes Jessica Ramsay MD   liraglutide (VICTOZA PEN) 18 MG/3ML solution Inject subcu 1.2mg daily. 9/28/22  Yes Renetta Washington PA-C   loperamide (IMODIUM A-D) 2 MG tablet Take 2 mg by mouth 4 times daily as needed for diarrhea   Yes Reported, Patient   loratadine (CLARITIN) 10 MG tablet Take 10 mg by mouth daily as needed for allergies   Yes Reported, Patient   melatonin 3 MG tablet Take 3 mg by mouth At Bedtime   Yes Unknown, Entered By  History   metFORMIN (GLUCOPHAGE XR) 500 MG 24 hr tablet Take 1 tablet (500 mg) by mouth daily (with dinner) 2/3/23  Yes Vu Islas MD   metoprolol tartrate (LOPRESSOR) 100 MG tablet Take 100 mg by mouth 2 times daily   Yes Reported, Patient   nitroGLYCERIN (NITROSTAT) 0.4 MG SL tablet Place 0.4 mg under the tongue every 5 minutes as needed.   Yes Reported, Patient   olopatadine (PATADAY) 0.2 % ophthalmic solution Place 1 drop into both eyes daily For itchy eyes 6/28/22  Yes Jessica Ramsay MD   omeprazole (PRILOSEC) 20 MG DR capsule Take 2 capsules (40 mg) by mouth daily 2/3/23  Yes Vu Islas MD   polyvinyl alcohol (LIQUIFILM TEARS) 1.4 % ophthalmic solution Place 1 drop into both eyes 3 times daily   Yes Reported, Patient   senna-docusate (SENOKOT-S/PERICOLACE) 8.6-50 MG tablet Take 2 tablets by mouth 2 times daily as needed for constipation 8/6/19  Yes Lisa Stevens APRN CNP   simethicone (MYLICON) 125 MG chewable tablet Take 125 mg by mouth 4 times daily as needed After meals and HS prn   Yes Reported, Patient   traMADol (ULTRAM) 50 MG tablet Take 25 mg by mouth 2 times daily   Yes Reported, Patient   traMADol (ULTRAM) 50 MG tablet Take 25 mg by mouth 2 times daily as needed for severe pain (7-10)   Yes Reported, Patient   traZODone (DESYREL) 50 MG tablet Take 1 tablet (50 mg) by mouth At Bedtime 2/3/23  Yes Vu Islas MD   White Petrolatum-Mineral Oil (REFRESH P.M.) OINT Place into both eyes nightly as needed   Yes Reported, Patient        ALLERGIES:   Allergies   Allergen Reactions     Dust Mites Other (See Comments)     Sneezing runny eyes and nose.     Food Allergy Formula Hives     Mountain Dew and Walnuts     Pollen Extract Other (See Comments)     Sneezing runny eyes and nose.        SOCIAL HISTORY:  Social History     Tobacco Use     Smoking status: Former     Packs/day: 1.50     Years: 45.00     Pack years: 67.50     Types: Cigarettes     Start date: 1/1/1968      Quit date: 3/6/2013     Years since quittin.9     Smokeless tobacco: Never   Vaping Use     Vaping Use: Never used   Substance Use Topics     Alcohol use: Not Currently     Drug use: Never        FAMILY HISTORY:  Family History   Problem Relation Age of Onset     Hypertension Mother      Cerebrovascular Disease Mother      Glaucoma Father      Cancer Father      Diabetes Sister      Glaucoma Sister      Cancer - colorectal Other      Cerebrovascular Disease Other      Skin Cancer No family hx of      Melanoma No family hx of      Anesthesia Reaction No family hx of      Deep Vein Thrombosis (DVT) No family hx of      Arthritis Brother      Diabetes Sister      Glaucoma Sister         PHYSICAL EXAM:   /61 (BP Location: Left arm)   Pulse 61   Temp 98.8  F (37.1  C) (Oral)   Resp 18   Wt 66.8 kg (147 lb 4.3 oz)   SpO2 100%   BMI 23.07 kg/m       PHYSICAL EXAM:  General: alert, oriented, NAD  SKIN: no suspicious lesions, rashes, jaundice, or spider angiomas  HEAD: Normocephalic. No masses, lesions, tenderness or abnormalities  NECK: Neck supple. No adenopathy. Thyroid symmetric, normal size.  EYES: No scleral icterus  ENT: ENT exam normal, no neck nodes or sinus tenderness  RESPIRATORY: negative, Good diaphragmatic excursion. Lungs clear  CARDIOVASCULAR: negative, PMI normal. No lifts, heaves, or thrills. RRR. No murmurs, clicks gallops or rub  GASTROINTESTINAL: +BS, soft, NT, ND, no HSM, no masses/guarding/rebound  JOINT/EXTREMITIES: extremities normal- no gross deformities noted, gait normal and normal muscle tone  NEURO: Reflexes grossly normal and symmetric. Sensation grossly WNL.  PSYCH: no abnormal anxiety/depression  LYMPH: No anterior cervical, posterior cervical, or supraclavicular adenopathy     LABS:  I reviewed the patient's new clinical lab test results.   Recent Labs   Lab Test 23  0523 23  0643 23  1106   WBC 6.2 7.4 8.0   HGB 8.4* 8.3* 7.4*   MCV 93 90 89     236 209     Recent Labs   Lab Test 02/26/23  0537 02/25/23  0555 02/24/23  0515 02/23/23  0722 02/22/23  0557 02/21/23  0656 02/20/23  0643 02/19/23  1106 02/18/23  0247   NA  --   --   --   --  135*  --  136  --  139   POTASSIUM 4.0 4.3 4.4   < > 4.3  4.2   < > 4.4   < > 5.1  5.1   CHLORIDE  --   --   --   --  105  --  109*  --  109*   CO2  --   --   --   --  23  --  21*  --  18*   BUN  --   --   --   --  8.0  --  7.5*  --  17.5   ANIONGAP  --   --   --   --  7  --  6*  --  12   OLAF  --   --   --   --  8.0*  --  7.5*  --  7.7*    < > = values in this interval not displayed.     Recent Labs   Lab Test 02/23/23  0722 02/17/23  1035 02/16/23  1924 02/16/23  1431 01/04/23  0324 01/03/23  2315 11/27/20  0456 09/11/19  1244   ALBUMIN 0.9* 1.8*  --  2.9*   < >  --    < >  --    BILITOTAL 0.5 0.6  --  0.8   < >  --    < >  --    ALT 17 22  --  36*   < >  --    < >  --    AST 31 35  --  54*   < >  --    < >  --    ALKPHOS 173* 184*  --  318*   < >  --    < >  --    PROTEIN  --   --  20*  --   --  20*  --  Negative    < > = values in this interval not displayed.        IMAGING  No pertinent GI imaging.     CONSULTATION ASSESSMENT AND PLAN:    72 year old female with past medical history significant for CVA, dementia, type 2 diabetes, diabetic neuropathy, CKD, coronary artery disease, bedbound with chronic sacral decubitus ulcer, malnutrition, failure to thrive admitted February 16 with infected sacral decubitus ulcer with possible necrotizing fasciitis, sepsis.    1. Malnutrition/failure to thrive. Agree with prior assessment that acute GI process for patient's poor PO intake unlikely. No evidence of acute GI bleeding with stable HGB. No nausea or vomiting, however, diabetic gastroparesis could be considered. PUD, malignancy, outlet obstruction unlikely. Had outpatient EGD 3/1 scheduled from previous admission. Family asking for EGD prior to possible PEG tube placement later this week. Family is still considering PEG tube  at this time. Of note, SLP evaluation shows oropharyngeal dysphagia.  --Will make NPO at midnight for possible EGD tomorrow. Patient mentioned she is not certain that she wants this done.   --No urgent indication for EGD at this time.   --Continue daily PPI.     Discussed with Dr. Amaya.     Total time spent:  Approximately, 35 minutes was spent providing patient care, including patient evaluation, reviewing documentation/test results, and . Thank you for asking us to participate in the care of this patient.      Julisa Pressley, PAC  [facility] ([facility])

## 2023-02-26 NOTE — PROGRESS NOTES
"Care Management Follow Up     Length of Stay (days): 10     Expected Discharge Date: 02/28/2023     Concerns to be Addressed: discharge planning , palliative following , may need g tube placed    Patient plan of care discussed at interdisciplinary rounds: Yes     Anticipated Discharge Disposition: Long Term Care     Anticipated Discharge Services: Other (see comment) (nursing care services, assistance with ADLs, care and supervision)  Anticipated Discharge DME: None     Patient/family educated on Medicare website which has current facility and service quality ratings: yes  Education Provided on the Discharge Plan:    Patient/Family in Agreement with the Plan: yes     Referrals Placed by CM/SW: Post Acute Facilities  Private pay costs discussed: Not applicable     Additional Information:  Chart reviewed.     Background:  From [facility]. Family does not want pt to return to [facility]- want her to go to [facility]. Pt will need to return to [facility] and work on different placement as outpatient. Will need IV Abx and possibly wound vac. Transport TBD . Son Camron and dtr Alexandra involved    CM updates:  2/24 Per pts daughter Court, and son Camron they do not want pt to return to [facility]. Court stated, \"my mom will not be returning to that facility, we have law suits against them, and called the police to go in there.\" RNCM spoke to Adelaida Supervisor at [facility] phone [phone]. She stated patient has expressed she likes living at their facility. The State did come to review patient's care and found no concerns.She further shared that In the past Cristal has expressed her wishes to not eat, due to having pureed diet stating, \"I do not eat baby food, I have had babies, I do not want that.\" Adelaida also tried calling the family to talk about possible hospice for pt, they also never called back.      Palliative following, per note pts wishes for them to speak to children before deciding on tube feedings. Hospitalist note from " yesterday - family hoping for care conference on Monday or Tuesday to discuss PEG placement.     Patient will likely need transportation on discharge.     CM will continue to follow plan of care, review recommendations, and assist with any discharge needs anticipated.      CHRISTIN Haas

## 2023-02-26 NOTE — PROGRESS NOTES
"Most recent blood sugar check was 73. Patient agreed to take in some orange juice, she drank about 60ml of the juice and refused to drink any more. Offered ginger ale, chicken soup and cracker, patient refused all. She also reports that she still has pain, this writer crushed tylenol, oxycodone and put it in a spoonful of magic cup. Patient did take the whole spoonful. This writer tried to offer more of the magic cup, patient refused and told me to \"get away from me\"  "

## 2023-02-26 NOTE — PROVIDER NOTIFICATION
Note sent to Dr Corea question to stop current IV fluids D5LR with potassium at 75/hour. Blood glucose currently 242 and has been elevated above 200 other times today as well. Ok to stop fluids for tonight per Dr. Corea.

## 2023-02-27 NOTE — PROGRESS NOTES
"[facility]  Palliative Care Daily Progress Note    Requesting Clinician / Team: Beny Frias [place] [place]  Reason for consult: \"Pt here with sepsis due to infected stage IV decubitus ulcers, dementiia, residing in LTC who has been refusing to eat.  Consult for goals of care.\"    Goals of care: Life prolonging, currently without limits at this time.     Palliative Care attempting to clarify health care agents and then have goals of care discussions.     Plan: Arranging a family meeting Tuesday at 1100. Hope both dtrs Valerie and Court can attend.       Advanced care planning  Has POLST on file  No HCD.  For my colleague last week, pt told her she would want daughter Court \"Alexandra\" to act as her surrogate decisiona maker supported by her siblings (Cristal's children). For me, she also endorsed dtr Court (Court has also been surrogate decision maker at [facility]). However for hospitalist today, she mentioned her dtr Valerie.   Recommend attempting short form HCD just designating health care agent during this hospital stay and having notarized.  Cristal has partial capacity and I think she has capacity to name her surrogate/agent.     Symptom Management  Palliative Care is not active in symptom management at this time.     Psychosocial  Support: Church, active Alevism and spiritual life.  Welcomes spiritual care support.  Five children (Jonas and Camron, sons; daughters April, Court, and Valerie)    Palliative Care will follow up tomorrow  LAMONT Duran, FNP-BC, PMHNP-BC  Palliative Care Nurse Practitioner  [facility]  [phone]    During regular M-F work hours -- if you are not sure who specifically to contact -- please contact us by calling [phone].  ________________________________________________________________________________     Assessments:  Cristal Preciado is a 72 year old female with CAD, cognitive impairment " "but not diagnosed with dementia prior to admission and multiple episodes of acute encephalopathy, visual impairment (glaucoma and diabetic retinopathy), IDDM2 w neuropathy, retinopathy, nephropathy, GERD< HTN, HLD, prior stroke (left her wheelchair bound), major depression, CKD2, semi-recent Covid infection (12/29/22).     Cristal moved into a new SNF ([facility]) in December.  She has had a longstanding sacral decubitus ulcer and presented with infected ulcer and concern for necrotizing fasciitis based on elevated lactic acid, leukocytosis, etc; she underwent debridement on 2/17/23 with Dr Ragsdale.  Over the past week, Crsital has refused to eat much and is not meeting nutritional needs.       Palliative Care communication  Last week, for my colleague last week, multiple visits to try to connect with daughter Court [called \"Alexandra\" per Cristal/first degree family members] and able to contact and communicate with son Camron, and finally able to speak with Court at 1645 Friday. Advised a family meeting. Given lack of designated health care agent, would want to have informational meeting with family members who would be engaged in surrogate decision making for Cristal to ascertain their understanding of her clinical situation and her prognosis as multiple providers this hospital stay have expressed concern.  Would also want to clarify what brings sharyn and meaning to Cristal to support best QoL moving forward.  -->Prognosis appears to be months, potentially longer if Cristal is able to tolerate artificial nutritional support.    -->The writer has concern about whether Cristal will tolerate nasal tube feedings based on her initial response to my query (see below). However, given her poor oral intake and currently life-prolonging goals of treatment, it makes sense for family to try this.  If Cristal repeatedly attempts to remove the nasal feeding tube, requires prolonged restraints, etc would need further discussion--even if a patient lacks full " decisional capacity, she/he can refuse treatments; forcing a patient to repeatedly accept a treatment they verbalize refusal to would be ethically problematic and potentially considered battery.    Today, Pt has been frustrated with multiple providers when they have attempted to talk with her, and has asked to be left alone.     Spoke with MD from today. Hope to try to convene a family meeting with ? both Shantelle and Valerie, ? Tuesday at 1100, to discuss with pt her health care agents, attempt a short form health care directive, and to discuss her goals of care, particularly related to nutrition and all, to then decide her wishes, next steps as far as ? EGD (currently scheduled for outpt March 2nd) and PEG.     Addendum: Tried to reach dtr Court again this afternoon. Unable to reach. Did speak with dtr Valerie, explained the above. Valerie can come into the hospital tomorrow at 1100. Tried Court again, LVM with the meeting time, and the hope to have both dtrs at the hospital, if possible, with pt, discussing goals of care, health care agents, health care directive, and desired care plan for her nutrition.        Prognosis, Goals, & Planning:      Functional Status just prior to hospitalization:  Palliative Performance Score:       20%- 1. Totally bed bound; 2. Unable to do any activity, extensive disease; 3. Total care; 4. Minimal to sips; 5. Full or drowsy +/- confusion        Prognosis, Goals, and/or Advance Care Planning were addressed today: Yes        Summary/Comments: only partially.  Unable to discuss advanced care planning and prognosis; reviewed goals with Camron.       Patient's decision making preferences: shared with support from loved ones          Patient has decision-making capacity today for complex decisions: Partial (needs assistance with complex decisions)  I believe that Cristal can express preferences, and refuse treatments, but complex decisions need support from her children.            I  have concerns about the patient/family's health literacy today: Yes           Patient has a completed Health Care Directive: No.        Code status: Full Code     Coping, Meaning, & Spirituality:   Mood, coping, and/or meaning in the context of serious illness were addressed today: Yes  Summary/Comments:  Taoism faith, welcomes spiritual care.  Time with her children is most important to her at this time.     Social:      Living situation: resident at [facility] since December 2022; previously was at [facility] from 2011.    Key family / caregivers: son, Camron, daughters Shantelle (Alexandra),Valerie, April and son Jonas.  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    Financial concerns: not discussed.     History of Present Illness:  History gathered last week by my colleague from: patient, family/loved ones, medical chart, medical team members, outside records including Care Everywhere     Cristal Preciado is a 72 year old female with CAD, cognitive impairment but not diagnosed with dementia prior to admission and multiple episodes of acute encephalopathy, visual impairment (glaucoma and diabetic retinopathy), IDDM2 w neuropathy, retinopathy, nephropathy, GERD< HTN, HLD, prior stroke (left her wheelchair bound), major depression, CKD2, semi-recent Covid infection (12/29/22).     Cristal moved into a new SNF ([facility]) in December.  She has had a longstanding sacral decubitus ulcer and presented with infected ulcer and new greater trochanteric ulcer and concern for necrotizing fasciitis based on elevated lactic acid, leukocytosis, etc; she underwent debridement on 2/17/23 with Dr Ragsdale.  Over the past week, Cristal has refused to eat much and is not meeting nutritional needs with current intake.       I introduced Palliative Care as a specialty that works to help patients with serious illness live as well as possible, and manage symptoms/side  effects of the illness or its treatment.  Palliative medicine also works to provide an extra layer of support to patients experiencing serious illness and their families.  Our specialty also helps with advance care planning (making health care directives and helping a person receive care that is in keeping with their individual hopes and values).      Cristal noted what's most important to her at this time are her children and grandchildren, and having time with them.     Cristal was unable to clearly express understanding of what's happening to her medically currently.  She is aware of her decubitus wounds to a degree.  Cristal affirmed she doesn't feel like eating.  She clearly told me that she isn't hungry, she doesn't have any pain with eating or swallowing, but that she doesn't want to eat.  She denied feeling depressed.     I noted she is currently not eating enough calories to enable her to heal her wounds.  I asked Cristal if she would be willing to have a nasal feeding tube placed in order for her to get calories and macronutrients to hopefully be able to heal her wound.  She strongly said she DEFINITELY WOULD NOT WANT A NASAL FEEDING TUBE.  I asked if she would be willing to have a PEGJ placed, in which a tube would be placed into the stomach through the abdominal wall, for her to have nutrition provided that way.  She said she was not sure and that I should speak with her children.     Chart review shows Cristal is having regular (nearly daily) bowel movements.       I had phone calls with Camron and a face to face visit  with Camron, and attempted to reach Court via telephone but unable to leave message and unable to speak with her.  Camron noted Court is home-schooling her daughter and unable to come into the hospital until after 3.  The writer stopped by at 3:15 and no family members were in Cristal's room.       Children's understanding of medical situation: Son Camron aware that Cristal is not eating adequate  "calories.  He notes his sister Court \"Alexandra\" had already spoken with hospitalist provider and provided assent to placement of NJ for nutritional support.  The writer was unable to speak with Court to follow up.  Camron did not wish to discuss goals of care, etc without Court present.     Late today I was abl to speak with Court; she is aware that things have been changing for Cristal's functional status since December 2022.  Cristal had been able to call and arrange transport for visits to Court's home prior to December, but after she got delirious with Covid infection, she hasn't been as clear cognitively since but has good and bad days.  Not diagnosed with dementia.  Court hopes that Cristal could get back to her prior level of function but worries that she might not.  I recommended family meeting and Court is open to this, and open to discussing further about PEG risks/benefits, duration of artificial nutrition, and goals of treatment.  We reviewed that if Cristal refused artificial nutrition, then goals of treatment would likely need to shift to comfort focus and it may be helpful to talk about that ahead of time, just for informational purposes to prepare.  Court affirmed she's been wondering some about those topics.  She would also like to try to complete HCD during this hospital stay, had been trying to do so at the SNF.                  Interval History:     Chart review/discussion with unit or clinical team members:   Per hospitalist note/chart review from yesterday, patient has been a nursing home resident in [facility] since Oct 2011 due to lower extremity weakness from a stroke. She has become bed bound and wheelchair bound for 5 years. Had 4 admissions for the past two months for evaluation of UTI, encephalopathy, malnutrition, and weight loss.   - Palliative care consulted. Plan a family conference within the next 2-3 days.   - Had a long discussion with her daughter " Valerie today. She states that there are some disagreement among the siblings. She is not aware about the care conference next week. Her mother has been making decisions all the time in the past. Valerie has been the primary care giver for her mother for a long time and knows her mother the best. She would like to be the primary contact and patient's healthcare representative. Patient's son, Camron, has been banned from the hospital due to threatening behaviors towards staff. Her daughters Court and Valerie have been visiting her.     Per patient or family/caregivers today:  Spoke with pt this AM. She is aware she is in [facility] (but does not know it is [facility]), she is aware Jamal Demarco is the president,     Key Palliative Symptoms:  SOB-No  Pain-Said hurts all over, unable to specify where.                Review of Systems:     Besides above, an additional ROS system was not done today.           Medications:     I have reviewed this patient's medication profile and medications during this hospitalization.           Physical Exam:   Vitals were reviewed  Temp: 98.3  F (36.8  C) Temp src: Axillary BP: 129/41 Pulse: 64   Resp: 16 SpO2: 100 % O2 Device: None (Room air)    Constitutional:   Awake, alert, cooperative, no apparent distress, and appears stated age   Pt was conversant, for a short time, able to answer some simple questions, but then got frustrated and did not want to talk further (has done this with multiple providers).       TTS: I have personally spent a total of 35 minutes  today on the unit in review of medical record, consultation with the medical providers and assessment of patient, with more than 50% of this time spent in counseling, coordination of care, and discussion with an additional 30 minutes in a prolonged meeting with dtkimberley Pedraza, with attempts to reach dtkimberley Hart, and with MD re: health care agent, a family meeting re: diagnostic results, development of plan of care.  Total time  65 minutes.     LAMONT Duran, FNP-BC, PMHNP-BC  Palliative Care Nurse Practitioner  [facility]  [phone]      During regular M-F work hours -- if you are not sure who specifically to contact -- please contact us by calling [phone].

## 2023-02-27 NOTE — PROGRESS NOTES
[facility]    Medicine Progress Note - Hospitalist Service    Date of Admission:  2/16/2023    Assessment & Plan     Cristal Preciado is a 72 year old female with h/o bedbound nursing home resident, sacral decubitus ulcer (present on admission), CVA, dementia, neuropathy, DM2, CKD, CAD, failure to thrive admitted on 2/16/2023 with infected new sacral decubitus ulcer with possible necrotizing fasciitis.      Severe sepsis secondary to infected stage IV sacral and right greater trochanter decubitus ulcers, necrotizing fasciitis:  On admission, sepsis criteria met with leukocytosis (WBC 14.1), tachycardia and lactic acidosis (Lactic acid 6.4). Source being infected infected stage IV sacral and right greater trochanter decubitus ulcers.   Received I&D by Dr. Ragsdale on 2/17. Found gross necrosis of the subcutaneous tissues and then fascia extending all the way down to the sacrum bone.  Cultures growing Proteus, E.coli and Enterococcus. Blood culture has no growth.   - ID consulted and input appreciated. Initially treated with Vancomycin, clindamycin and Zosyn. Now on Zosyn per culture sensitivity. Per ID, plan for 4-6 weeks of IV Zosyn. PICC line in right arm.  - Wound VAC placed. Continue wound care.     Moderate malnutrition, weight loss, failure to thrive:   Malnutrion status as determined by dietitian, see their notes for details.   Patient was admitted from 2/1-2/3/23 for evaluation of weight loss, failure to thrive and decreased appetite. GI was consulted. Per consult note on 2/3/23, the above issues are related to patient's multiple comorbidities and age rather than an acute GI process. Family demanded an EGD to be done. Outpatient EGD was scheduled on 3/1/23.  After this admission, has been eating little or none. The etiology is multifactorial. Her poor nutrition status affects her wound healing. Has ongoing discussion about tube feed.   - Patient has history of oropharyngeal dysphagia. Per  SLP evaluation on 2/16/23, recommends level 4/pureed textures and thin liquids. Per family, patient does not like puree diet and that would contribute to her poor appetite. Patient wanted to upgrade to soft bite size diet with thin liquid on 2/21.  - Per family, patient's appetite could be affected by mood easily. Brother recently passed away, which affects her mood to eat. Her daughter Valerie agrees to have a trial of Remeron (started 2/26).   - Patient defers the decision about tube feed to her children. The initial plan was to place a temporary NJ feeding tube until the family make decision on PEG tube. Discussed with her daughter Court and Valerie, they would like to hold on the NJ tube placement. They feel patient has shown signs of increased oral intake when family bring in food from home. Family has been bringing in patient's favorite food for the past few days. However, patient did not really eat much.  Discussed with nursing staff to document the amount of food patient eats.  Continue calorie counts. Family will have a conference about PEG placement tomorrow.  - Per Valerie, she still wants the EGD to be done since patient has a history of ulcer. Consulted GI.     Type II DM with hypoglycemia: HgbA1c on 1/3/2023 was 7.6 %. Patient is refusing to eat and has had numerous hypoglycemic episodes. PTA lantus 20 units QAM and Novolog sliding scale. PTA Victoza and metformin on hold.  - On D5 drip. Lantus reduced to 5 units subcu every morning. Novolog sliding scale.  - Hypoglycemic protocol     HTN: Blood pressure is well controlled. Continue PTA metoprolol.    History of CAD: on metoprolol and lipitor.      Acute blood loss anemia: Hgb was 5.7 after surgery. Transfused 2 units of packed RBCs  - Continue to monitor and transfuse to keep Hgb > 7. Hemoglobin has been stable.     UTI: on Zosyn as above. Urine culture grew mixed urogenital brittani     Hyponatremia: Mild. Likely due to hypovolemia and poor oral  intake. Resolved.      Hypercalcemia: resolved may have been due to dehydration     Hypokalemia: replacement protocol      Hypomagnesemia: Replace per protocol     Pressure ulcer on bilateral heels, unstageable: Present on admission. L heel: 4  x 5.5 cm  and R heel: 2  x 2 cm, the skin surface is black.  - Wound care consulted. On offload boots.     History of ulcer, GERD: on PPI.    Hyperlipidemia: on statin.     Goal of care: Per chart review, patient has been a nursing home resident in [facility] since Oct 2011 due to lower extremity weakness from a stroke. She has become bed bound and wheelchair bound for 5 years. Had 4 admissions for the past two months for evaluation of UTI, encephalopathy, malnutrition, and weight loss.   - Palliative care consulted. Plan a family conference tomorrow  - Heatare representatives: Patient has been able to make decision herself. However, for the past few days, she has become agitated easily and often refuses to answer questions. She has indicated to let her children to make decision for her. She previosuly told staff that she wants Court to be the primary representative. However, her other daughter Valerie told the provider that there are some disagreement among the siblings. She is not aware about the care conference coming up. Valerie states that she has been the primary care giver for her mother for a long time and knows her mother the best. She would like to be the primary contact and patient's primary healthcare representative. Patient's son, Camron, has been banned from the hospital due to threatening behaviors towards staff. Her daughters Court and Valerie have been visiting her. Patient told this writer today that both Court and Valerie can make decisions for her.   Discussed with palliative care today. Plan to ask patient to sign ACP documents to designate healthcare representatives when she is still able to make decisions.          Diet:  Combination Diet Moderate Consistent Carb (60 g CHO per Meal) Diet; Soft and Bite Sized Diet (level 6); Thin Liquids (level 0)  Calorie Counts  Snacks/Supplements Adult: Magic Cup; With Meals    DVT Prophylaxis: Pneumatic Compression Devices  Cornejo Catheter: Not present  Lines: PRESENT      PICC 02/17/23 Triple Lumen Right Basilic Vascular access-Site Assessment: WDL      Cardiac Monitoring: None  Code Status: Full Code      Clinically Significant Risk Factors            # Hypomagnesemia: Lowest Mg = 1.4 mg/dL in last 2 days, will replace as needed   # Hypoalbuminemia: Lowest albumin = 0.9 g/dL at 2/23/2023  7:22 AM, will monitor as appropriate          # DMII: A1C = 7.6 % (Ref range: <5.7 %) within past 6 months    # Moderate Malnutrition: based on nutrition assessment        Disposition Plan      Expected Discharge Date: 03/01/2023    Discharge Delays: Placement - LTC  IV Medication - consider oral or Home Infusion  Procedure Pending (enter procedure & time in comments)  Destination: long-term care facility  Discharge Comments: needs tube feeding, EGD monday  wants new LTC          Pascual Corea MD  Hospitalist Service  [facility]  Securely message with [employer] (more info)  Text page via [employer] Paging/Directory   ______________________________________________________________________    Interval History   When Cristal was seen this morning, she was calm initially. She is able to recall that Valerie came visit her last night and brought her soup. But she would try it later. When asked whether she wants Valerie to make decision for her, she said yes. When asked whether she wants Court to make decision for her, she also said yes. She then got very agitated. She demanded to change her position on bed and refused to answer further questions.      Physical Exam   Vital Signs: Temp: 98.3  F (36.8  C) Temp src: Axillary BP: 129/41 Pulse: 64   Resp: 16 SpO2: 100 % O2 Device: None (Room air)    Weight:  147 lbs 4.28 oz    General appearance: not in acute distress  HEENT: PERRL, EOMI  Lungs: Clear breath sounds in bilateral lung fields  Cardiovascular: Regular rate and rhythm, normal S1-S2  Abdomen: Soft, non tender, no distension, normal bowel sound  Musculoskeletal: No joint swelling  Skin: Wound vac on right sacrum and right hip. Pressure ulcers on left and right heels. L heel 4  x 5.5 cm and R heel: 2  x 2 cm, the skin surface is black.  Neurology: Awake and alert. Wound answer some questions. Gets agitated easily. Cranial nerves II - XII normal.  Grossly normal muscle strength of bilateral upper extremities. No movement of bilateral lower extremities observed during this visit.       Medical Decision Making         40 minutes spend by me on the date of service doing chart review, history, exam, documentation & further activities per the note.       Data     I have personally reviewed the following data over the past 24 hrs:    N/A  \   N/A   / N/A     N/A N/A N/A /  103 (H)   3.9 N/A N/A \       Imaging results reviewed over the past 24 hrs:   No results found for this or any previous visit (from the past 24 hour(s)).

## 2023-02-27 NOTE — PROGRESS NOTES
[facility]    Infectious Disease Progress Note    Date of Service : 02/27/2023     Assessment & Plan   Cristal Preciado is a 72 year old female who was admitted on 2/16/2023.     ASSESSMENT:  1. CVA, sacral decubitus ulceration on admission, status post debridement of sacral ulcer and debridement of right hip ulcer on 2/17/2023  2. Osteomyelitis, both ulcerations go down to bone per operative report  3. Infected decubitus ulcer: Proteus, Enterococcus, E. coli cultures, all sensitive to Zosyn.  Tolerating Zosyn well.  CRP much better.  4. Multiple comorbid conditions: Diabetes, cognitive impairment, sacral wound, coronary artery disease, hypertension, hyperlipidemia        RECOMMENDATIONS:  1. Continue IV Zosyn  2. Weekly labs: creatinine, cbc with diff, CRP - will check again  3. Plan for 4 to 6 weeks of IV Zosyn.  4. Off-loading of all wounds. Heel needs to be in boot per wound care recommendations    Will follow peripherally. Call with questions    Benny Beauchamp MD  [facility]  Direct messaging: [employer]aging  On-Call ID provider: [phone], option: 9  ==================================    Interval History   First visit by me. No issues today. Possible EGD later this week. Wound vac in place. Patient without complaints today.      Physical Exam   Temp: 98.2  F (36.8  C) Temp src: Oral BP: (!) 153/66 Pulse: 56   Resp: 18 SpO2: 98 % O2 Device: None (Room air)    Vitals:    02/17/23 1129 02/22/23 0448   Weight: 78.1 kg (172 lb 2.9 oz) 66.8 kg (147 lb 4.3 oz)     Vital Signs with Ranges  Temp:  [97.9  F (36.6  C)-98.8  F (37.1  C)] 98.2  F (36.8  C)  Pulse:  [56-64] 56  Resp:  [16-18] 18  BP: ()/(40-66) 153/66  SpO2:  [97 %-100 %] 98 %    Constitutional: No apparent distress  Lungs: normal breathing pattern, no crackles or wheezing  Cardiovascular: Regular rate and rhythm, S1 S2  Abdomen: Slightly distended  Skin: warm  Ulcerations, see photos above  decubitus - wound vac in place. Left heel eschar.  Neuro: deconditioned, CVA    Media Information  Document Information    Other:  Photograph      02/16/2023 7:15 PM   Attached To:   Hospital Encounter on 2/16/23     Source Information    Waylon Singer MD [facility]     Media Information  Document Information    Other:  Photograph      02/16/2023 7:14 PM   Attached To:   Hospital Encounter on 2/16/23     Source Information    Waylon Singer MD [facility]         Medications     dextrose 5% and 0.45% NaCl 50 mL/hr at 02/27/23 1347     - MEDICATION INSTRUCTIONS -         atorvastatin  80 mg Oral QPM     diclofenac  2 g Topical 4x Daily     dorzolamide-timolol  1 drop Both Eyes BID     DULoxetine  90 mg Oral QAM     insulin aspart  1-7 Units Subcutaneous TID AC     insulin glargine  5 Units Subcutaneous QAM AC     latanoprost  1 drop Both Eyes At Bedtime     [Held by provider] liraglutide  1.2 mg Subcutaneous Daily     magnesium sulfate  4 g Intravenous Once     metoprolol tartrate  50 mg Oral BID     mirtazapine  15 mg Oral At Bedtime     olopatadine  1 drop Both Eyes Daily     pantoprazole  40 mg Oral BID AC     piperacillin-tazobactam  3.375 g Intravenous Q8H     polyvinyl alcohol  1 drop Both Eyes TID     sodium chloride (PF)  10-30 mL Intracatheter Q8H     sodium chloride (PF)  3 mL Intracatheter Q8H     thiamine  100 mg Oral Daily     traZODone  50 mg Oral At Bedtime       Data   All microbiology laboratory data reviewed.  Recent Labs   Lab Test 02/23/23  0523 02/20/23  0643 02/19/23  1106   WBC 6.2 7.4 8.0   HGB 8.4* 8.3* 7.4*   HCT 27.4* 26.0* 23.2*   MCV 93 90 89    236 209     Recent Labs   Lab Test 02/25/23  0555 02/22/23  0557 02/20/23  0643   CR 0.66 0.76 0.76     Recent Labs   Lab Test 02/16/23  1431   *     Recent Labs   Lab Test 08/06/19  0835 04/29/15  1420   CULT No growth No growth after 4 weeks       MICROBIOLOGY:    Reviewed   Contains abnormal data Wound  Aerobic Bacterial Culture Routine  Order: [id number]   Collected 2/16/2023  2:32 PM      Status: Preliminary result      Visible to patient: No (not released)     Specimen Information: Leg, Right; Wound    0 Result Notes  Culture Culture in progress       2+ Proteus mirabilis Abnormal        1+ Enterococcus faecalis Abnormal        1+ Escherichia coli Abnormal     Preliminary result, confirmation pending.   4+ Normal brittani            Resulting Agency: [facility]     Susceptibility     Proteus mirabilis Enterococcus faecalis     CARLA CARLA     Ampicillin <=2 ug/mL Susceptible <=2 ug/mL Susceptible     Ampicillin/ Sulbactam <=2 ug/mL Susceptible       Cefepime <=1 ug/mL Susceptible       Ceftazidime <=1 ug/mL Susceptible       Ceftriaxone <=1 ug/mL Susceptible       Ciprofloxacin <=0.25 ug/mL Susceptible       Gentamicin <=1 ug/mL Susceptible       Gentamicin Synergy   Susceptible... Susceptible 1     Levofloxacin <=0.12 ug/mL Susceptible       Meropenem <=0.25 ug/mL Susceptible       Penicillin   4 ug/mL Susceptible     Piperacillin/Tazobactam <=4 ug/mL Susceptible       Tobramycin <=1 ug/mL Susceptible       Trimethoprim/Sulfamethoxazole <=1/19 ug/mL Susceptible       Vancomycin   2 ug/mL Susceptible                1 No high level gentamicin resistance found - therefore combination therapy with an aminoglycoside may be indicated for serious enterococcal infections such as bacteremia and endocarditis.            Specimen Collected: 02/16/23  2:32 PM Last Resulted: 02/19/23  3:21 PM           Abscess Aerobic Bacterial Culture Routine  Order: [id number]   Collected 2/17/2023  8:45 PM      Status: Preliminary result      Visible to patient: No (not released)     Specimen Information: Hip, Right; Abscess    0 Result Notes  Culture 2+ Escherichia coli Abnormal        2+ Proteus mirabilis Abnormal        1+ Enterococcus faecalis Abnormal             Resulting Agency: [facility]     Susceptibility     Escherichia coli Proteus mirabilis  Enterococcus faecalis     CARLA CARLA CARLA     Ampicillin <=2 ug/mL Susceptible <=2 ug/mL Susceptible <=2 ug/mL Susceptible     Ampicillin/ Sulbactam <=2 ug/mL Susceptible <=2 ug/mL Susceptible       Cefepime <=1 ug/mL Susceptible <=1 ug/mL Susceptible       Ceftazidime <=1 ug/mL Susceptible <=1 ug/mL Susceptible       Ceftriaxone <=1 ug/mL Susceptible <=1 ug/mL Susceptible       Ciprofloxacin <=0.25 ug/mL Susceptible <=0.25 ug/mL Susceptible       Gentamicin <=1 ug/mL Susceptible <=1 ug/mL Susceptible       Gentamicin Synergy     Susceptible... Susceptible 1     Levofloxacin <=0.12 ug/mL Susceptible <=0.12 ug/mL Susceptible       Meropenem <=0.25 ug/mL Susceptible <=0.25 ug/mL Susceptible       Penicillin     4 ug/mL Susceptible     Piperacillin/Tazobactam <=4 ug/mL Susceptible <=4 ug/mL Susceptible       Tobramycin <=1 ug/mL Susceptible <=1 ug/mL Susceptible       Trimethoprim/Sulfamethoxazole <=1/19 ug/mL Susceptible <=1/19 ug/mL Susceptible       Vancomycin     2 ug/mL Susceptible                  1 No high level gentamicin resistance found - therefore combination therapy with an aminoglycoside may be indicated for serious enterococcal infections such as bacteremia and endocarditis.            Specimen Collected: 02/17/23  8:45 PM Last Resulted: 02/20/23 12:42 AM                  7-Day Micro Results     No results found for the last 168 hours.           RADIOLOGY:    Reviewed  CT Pelvis Soft Tissue w Contrast    Result Date: 2/16/2023  EXAM: CT PELVIS SOFT TISSUE W CONTRAST LOCATION: [facility] DATE/TIME: 2/16/2023 6:31 PM INDICATION: Malodorous wounds to left buttock and right hip, severe sepsis on lab testing today COMPARISON: 1/3/2023 TECHNIQUE: CT scan of the pelvis was performed with IV contrast. Multiplanar reformats were obtained. Dose reduction techniques were used. CONTRAST: 100ml isovue 370 FINDINGS: Mild motion artifact. PELVIC ORGANS: Increased circumferential bladder wall  thickening measuring up to 0.8 cm is consistent with cystitis. Hysterectomy. Atherosclerotic vascular disease. MUSCULOSKELETAL: A new sacral decubitus ulcer with subcutaneous emphysema extending into the surrounding soft tissues with associated inflammatory changes. A new decubitus ulcer at the right greater trochanter with surrounding inflammatory changes. No loculated drainable fluid collection. No aggressive osseous erosion. Degenerative changes of the spine and hips.     IMPRESSION: 1.  New sacral decubitus ulcer with subcutaneous emphysema extending into the surrounding soft tissues. This could represent necrotizing fasciitis. 2.  A new right greater trochanter decubitus ulcer. 3.  Cystitis. [Critical Result: Necrotizing fasciitis] Finding was identified on 2/16/2023 6:35 PM. Dr. Singer was contacted by me on 2/16/2023 6:42 PM and verbalized understanding of the critical result.

## 2023-02-27 NOTE — PROGRESS NOTES
Calorie Count Note     Date of Calorie Count:      Diet RX:  Consistent Carb (60g/meal), soft and bite sized diet.  Thin liquids. + 2 magic cups (with lunch and dinner)    Meals Recorded:  : 50% orange sherbet & 50% vanilla magic cup taken in the afternoon  :  - Mornin bites vanilla pudding with morning meds & 3 bites of magic cup berry -per RN notes on the meal ticket & per RN's note  - Dinner: 60 mL of the juice per RN's note    Comments: Pt's oral intake remains very low. Current oral intake is unable to meet basic nutrition needs/increased nutrition needs for wound healing.      New Findings:  Patient noted with moderate malnutrition in context of acute illness - may declining due to prolong inadequate oral intake/nutrition needs  Medical team plans to re-discuss with family regarding FT placement today or tomorrow    Comments:   - NSA from nutrition service called this morning regarding whether pt could order chicken noodle soup on current diet. RD discussed with speech therapy and plan to allow pt to order chicken noodle soup as desires for now. RD informed NSA regarding the change.  - Patient with increased nutrition needs for wound healing though patient's nutrition needs continue to not met.   - Expect continued decline with inadequate nutrition.     Plan:   - Consider 500 mg vitamin C daily for wound healing and repletion.    - If consistent with goals of care consider appetite stimulant, nutrition support.     Ira Kingston (Amber), Dietetic Intern

## 2023-02-27 NOTE — PROGRESS NOTES
GI Progress Note  Cristal Preciado  -41     Subjective:   Spoke with daughter over the phone today (Valerie).  Valerie would like pt to have     Daughter has bringing in patient's favorite foods from home, though pt still not interested in eating.     Poor appetite dates back to Dec, when pt had COVID and also lost multiple relatives over the past several months.     At this point, pt still signs her own consents. However, daughter plans on taking over as POA in the future.  I asked that daughter be here on the day of the EGD.      Objective:   /41 (BP Location: Left arm)   Pulse 64   Temp 98.3  F (36.8  C) (Axillary)   Resp 16   Wt 66.8 kg (147 lb 4.3 oz)   SpO2 100%   BMI 23.07 kg/m    Body mass index is 23.07 kg/m .   Gen: No acute distress  Cardio: RRR  GI: Non-distended, BS positive, soft, non-tender. No guarding.    Laboratory  Recent Labs   Lab 02/23/23  0523   WBC 6.2   RBC 2.94*   HGB 8.4*   HCT 27.4*   MCV 93   MCH 28.6   MCHC 30.7*   RDW 15.9*         Recent Labs   Lab 02/22/23  0557   *   CO2 23   BUN 8.0     Recent Labs   Lab 02/23/23  0722   ALKPHOS 173*   AST 31   ALT 17     Lab Results   Component Value Date    INR 1.12 03/28/2013    INR 0.95 09/14/2010    INR 1.02 06/28/2007    TROPONIN 0.04 03/19/2008    TROPI <0.012 03/20/2008    TROPI 0.013 03/19/2008       No results found.      Assessment:   Ms Preciado is a 72 year old female with past medical history significant for CVA, dementia, type 2 diabetes, diabetic neuropathy, CKD, coronary artery disease, bedbound with chronic sacral decubitus ulcer, malnutrition, failure to thrive admitted February 16 with infected sacral decubitus ulcer with possible necrotizing fasciitis, sepsis.     Malnutrition/failure to thrive.  Cause unclear, though suspect pain is likely playing a role.  Question underlying depression with loss of several relatives.  Note SLP evaluation shows oral pharyngeal dysphagia.  While issues outside of GI  process are likely playing a role, can not exclude GI contribution to FTT.  Pt was scheduled for outpatient EGD on 3/1.  We have offered to do this as an inpatient.  Her daughter is strongly in favor of this.  Note family is considering PEG procedure.    Patient Active Problem List   Diagnosis     Cataract     CAD (coronary artery disease)     Diabetic nephropathy (H)     Diabetic neuropathy (H)     Diabetic retinopathy (H)     GERD (gastroesophageal reflux disease)     Glaucoma     Hypertension     Hyperlipidemia     H/O: stroke     Anemia     Seasonal allergies     Depression     Tubular adenoma of colon     Leg weakness     Dermatitis, seborrheic     Eczematous dermatitis     History of coronary artery disease     Venous stasis dermatitis of both lower extremities     Chronic dermatitis of hands     Neoplasm of uncertain behavior of skin     S/P hysterectomy     Hypomagnesemia     Gout     DM type 2 w Neuro/Retin/Nephr -- hgb A1C 7.6 on 1/3/23     Stage 3 chronic kidney disease, unspecified whether stage 3a or 3b CKD (H)     Acute chest pain     Chronic kidney disease, stage 2 (mild)     Acute metabolic encephalopathy     Oropharyngeal dysphagia     Sacral decubitus ulcer     Dehydration     Confusion     Adult failure to thrive     Acute kidney injury (H)     Cognitive impairment Consistent with Mild Dementia     Polyneuropathy associated with underlying disease (H)     Acute cystitis with hematuria     Delirium     Protein-calorie malnutrition (H)     Wound infection     Sacral wound, initial encounter     Severe sepsis (H)     Hypokalemia     Hypercalcemia     Anemia due to blood loss, acute     Pressure injury of contiguous region involving buttock and hip, stage 4 (H)     Necrotizing fasciitis (H)     Hypoglycemia     Pressure ulcer of both heels, unstageable (H)      Plan:   1.  Continue PPI daily.  2.  Plan on EGD 3/1/2023 in OR with MAC sedation.  3.  Antibiotics per ID.    Discussed with Dr. Mata.    Thank you.  Shade Kuhn PA-C  [facility]  [phone]

## 2023-02-27 NOTE — PROGRESS NOTES
[facility] Nurse Inpatient Assessment     Consulted for: Buttocks, heels    Patient History (according to provider note(s):      Cristal Preciado is a 72 year old female with PMH significant for known sacral decubitus ulcer (present on admission), CVA, dementia, neuropathy, DM2, CKD, CAD and FTT admitted on 2/16/2023 with infected new sacral decubitus ulcer with possible necrotizing fasciitis.      1. Sepsis- Admit patient. Infected Sacral Decubitus ulcer/Possible Necrotizing fasciits,UTI,Lactic acid= 6.4, WBC= 14.1. CT pelvis report reviewed. ED physician has already been in contact with General Surgeon on call and patient will be seen in consultation. Continue Vancomycin, Zosyn and Clindamycin. Continue IV fluids. Currently NPO. Repeat labs in a.m. Monitor vitals closely. Will make further recommendations based on clinical course.     2. Possible Necrotizing Fasciitis- ED has contacted General Surgeon on call and patient will be seen in consultation. Continue Vancomycin, Zosyn and Clindamycin. Plan per surgeon.    Areas Assessed:      Pressure Injury Location: Sacrum          2/20 2/22 2/24 2/27 2/27, view into the undermining to bottom half of wound        Wound type: Pressure Injury     Pressure Injury Stage: 4, present on admission   Wound history/plan of care:   Patient admitted with worsening pressure wounds, was taken for an I&D    Wound base: 30 % slough and bone, 70 % dermis, non-granular tissue and adipose tissue, scant granulation starting     Palpation of the wound bed: boggy      Drainage: scant     Description of drainage: serosanguinous     Measurements (length x width x depth, in cm) 10  x 12  x  3.5 cm      Tunneling N/A     Undermining up to 1.5 cm from 8-11  o'clock  Periwound skin: Intact      Color: normal and consistent with surrounding tissue, slight purple discoloration near gluteal cleft      Temperature: normal   Odor: none  Pain: moderate, with palpation and during dressing change  Pain intervention prior to dressing change: slow and gentle cares   Treatment goal: Heal , Increase granulation and Protection  STATUS: evolving  Supplies ordered: gathered    My PI Risk Assessment     Sensory Perception: 2 - Very Limited     Moisture: 1 - Constantly moist     Activity: 1 - Bedfast      Mobility: 2 - Very limited     Nutrition: 2 - Probably inadequate      Friction/Shear: 1 - Problem     TOTAL: 9  __________________________________________________________________________________________________________________  Pressure Injury Location: [facility]         2/20 2/22 2/24 2/27    Wound type: Pressure Injury     Pressure Injury Stage: 4, present on admission   Wound history/plan of care:   See above    Wound base: 30 % slough and bone, 70 % non-granular tissue and adipose tissue, scant granulation      Palpation of the wound bed: normal      Drainage: scant     Description of drainage: serosanguinous     Measurements (length x width x depth, in cm) 12  x 10.5  x  3 cm      Tunneling N/A     Undermining N/A  Periwound skin: Intact      Color: normal and consistent with surrounding tissue      Temperature: normal   Odor: none  Pain: mild,   Pain intervention prior to dressing change: slow and gentle cares   Treatment goal: Heal , Increase granulation and Protection  STATUS: evolving  Supplies ordered: gathered  ________________________________________________________________________________________________________________    Negative pressure wound therapy applied to: Sacrum &  KIMBERLY fox   Last photo: see above  Wound due to: Pressure Injury   Wound history/plan of care:      Surgical date: 2/18/23     Date Negative Pressure Wound Therapy initiated: 2/20/23     Interventions in place: repositioning, pressure redistribution with device or dressing, specialty surface in use, moisture/incontinence management and offloading    Is patient s nutritional status compromised? yes   a. If yes, what interventions are in place? Protein supplements    Reason for initiating vac therapy? Presence of co-morbidities, High risk of infections, Need for accelerated granulation tissue and Prior history of delayed wound healing    Which?of?the?following?co-morbidities?apply? Immobility  a. If diabetic is patient on a diabetic management program? N/A     Is osteomyelitis present in wound? Y  a.  If yes what treatments are in place? N/A    Number of foam pieces removed from a wound (excluding foam for bridge) : 4 total pieces of cleanse choice   Verified this matched the number of foam pieces applied last dressing change: N/A   Number of foam pieces packed into wound (excluding foam for bridge) : 3 pieces  GranuFoam Black. To sacral wound: 20cc Vashe, 5 minute soak, every 4 hours, -125mmHg.   _____________________________________________________________________________________________________________________________________________________________________    Pressure Injury Location: Bilateral heels      2/20 L heel                                                         2/27 2/20 R heel                                                         2/27     Wound type: Pressure Injury     Pressure Injury Stage: Unstageable, present on admission      Wound history/plan of care:   See above    Wound base: 100 % eschar     Palpation of the wound bed: firm      Drainage: none     Description of drainage: none     Measurements (length x width x depth, in cm)   L heel: 4  x 5.5 cm    R heel: 2  x 2 cm      Tunneling  "N/A     Undermining N/A  Periwound skin: Intact and Scar tissue      Color: pink      Temperature: normal   Odor: none  Pain: absent, none  Pain intervention prior to dressing change: slow and gentle cares   Treatment goal: Maintain (prevention of deterioration) and offload pressure, reduce friction and shear  STATUS: initial assessment  Supplies ordered: supplies stored on unit - no change in care needed      Treatment Plan:     Negative pressure wound therapy plan:  Wound location: Sacrum + R Trocanter    Change Days: Mon/Wed/Fri by WOC RN    Supplies (including all accessories) used: medium Black foam , Adapt barrier ring and Cavilon no sting barrier film  Cleanse with Vashe prior to replacing VAC    Suction setting: -125   Methods used: Window paned all periwound skin with vac drape prior to applying sponge and barrier rings around entire sacral wound to protect from stool     Staff RN to assess integrity of dressing and ensure suction is set at appropriate level every shift.   Date canister. Chart canister output every shift. Change cannister weekly and PRN if full/occluded.    Remove foam dressing and replace with BID normal saline moist gauze dressing if:   -a dressing failure which cannot be repaired within 2 hours   -patient is discharging to home without a home pump   -patient is discharging to a facility outside the local area   -if a dressing is a \"Silver Foam\", remove before Radiation Therapy or MRI     The hospital VAC pump is not to be discharged with the patient.?Ensure to disconnect patient from machine prior to discharge. Then,    - If a home KCI VAC pump has been delivered, connect home cannister to dressing tubing then connect cannister to home pump and turn on machine    - If transferring to a nearby facility with a KCI vac, can disconnect and clamp tubing then cover end with glove so can be reconnected within 2 hours      Heels  1. Paint with betadine daily, allow to dry  2. Offload heels at " all times while in bed, utilize prevlon boots    RECOMMEND PRIMARY TEAM ORDER: None, at this time  Education provided: importance of repositioning, wound progress, Infection prevention , Moisture management and Off-loading pressure  Discussed plan of care with: Patient, Family, Nurse and Physician  WO nurse follow-up plan: Monday/WednesdayFriday  Notify WOC if wound(s) deteriorate.  Nursing to notify the Provider(s) and re-consult the WOC Nurse if new skin concern.    DATA:     Current support surface: Standard  Standard gel/foam mattress (IsoFlex, Atmos air, etc)  Containment of urine/stool: Incontinence Protocol  BMI: Body mass index is 23.07 kg/m .   Active diet order: Orders Placed This Encounter      Combination Diet Moderate Consistent Carb (60 g CHO per Meal) Diet; Soft and Bite Sized Diet (level 6); Thin Liquids (level 0)     Output: I/O last 3 completed shifts:  In: 381 [P.O.:60; I.V.:321]  Out: -      Labs:   Recent Labs   Lab 02/23/23  0722 02/23/23  0523   ALBUMIN 0.9*  --    HGB  --  8.4*   WBC  --  6.2     Pressure injury risk assessment:   Sensory Perception: 2-->very limited  Moisture: 3-->occasionally moist  Activity: 1-->bedfast  Mobility: 2-->very limited  Nutrition: 2-->probably inadequate  Friction and Shear: 1-->problem  Lalit Score: 11    MUNIRA AMBRIZ RN CWOCN  Pager no longer is use, please contact through Stretch   [facility]: WOC Nurse

## 2023-02-27 NOTE — CONSULTS
Care Management Follow Up    Length of Stay (days): 11    Expected Discharge Date: 03/01/2023     Concerns to be Addressed: discharge planning     Patient plan of care discussed at interdisciplinary rounds: Yes    Anticipated Discharge Disposition: Long Term Care     Anticipated Discharge Services: Other (see comment) (nursing care services, assistance with ADLs, care and supervision)  Anticipated Discharge DME: None    Patient/family educated on Medicare website which has current facility and service quality ratings: yes  Education Provided on the Discharge Plan:    Patient/Family in Agreement with the Plan: yes    Referrals Placed by CM/SW: Post Acute Facilities  Private pay costs discussed: Not applicable    Additional Information:  See below       Libertad Patiño, RN          Spoke to Eddy from [facility]. Says she will work on coordinating meeting time with family. CM is available to assist as needed     Spoke to Avinash at [facility]. Says patient has a bed hold. Says patient is her own decision maker. She was moved to [facility] to be with her daughter April who also lives there

## 2023-02-27 NOTE — PLAN OF CARE
"  Problem: Plan of Care - These are the overarching goals to be used throughout the patient stay.    Goal: Plan of Care Review  Description: The Plan of Care Review/Shift note should be completed every shift.  The Outcome Evaluation is a brief statement about your assessment that the patient is improving, declining, or no change.  This information will be displayed automatically on your shift note.  Outcome: Progressing  Goal: Patient-Specific Goal (Individualized)  Description: You can add care plan individualizations to a care plan. Examples of Individualization might be:  \"Parent requests to be called daily at 9am for status\", \"I have a hard time hearing out of my right ear\", or \"Do not touch me to wake me up as it startles me\".  Outcome: Progressing  Goal: Absence of Hospital-Acquired Illness or Injury  Outcome: Progressing  Intervention: Identify and Manage Fall Risk  Recent Flowsheet Documentation  Taken 2/27/2023 0200 by Fara Mason RN  Safety Promotion/Fall Prevention:   bed alarm on   safety round/check completed   room door open   patient and family education   nonskid shoes/slippers when out of bed   increased rounding and observation   fall prevention program maintained   clutter free environment maintained  Goal: Optimal Comfort and Wellbeing  Outcome: Progressing  Goal: Readiness for Transition of Care  Outcome: Progressing     Problem: Risk for Delirium  Goal: Optimal Coping  Outcome: Progressing  Goal: Improved Behavioral Control  Outcome: Progressing  Intervention: Minimize Safety Risk  Recent Flowsheet Documentation  Taken 2/27/2023 0200 by Fara Mason RN  Enhanced Safety Measures: bed alarm set  Goal: Improved Attention and Thought Clarity  Outcome: Progressing  Intervention: Maximize Cognitive Function  Recent Flowsheet Documentation  Taken 2/27/2023 0300 by Fara Mason RN  Reorientation Measures: clock in view     Problem: Pain Acute  Goal: Optimal Pain Control and Function  Outcome: " Progressing  Intervention: Prevent or Manage Pain  Recent Flowsheet Documentation  Taken 2/27/2023 0200 by Fara Mason RN  Medication Review/Management: medications reviewed     Problem: Electrolyte Imbalance  Goal: Electrolyte Imbalance: Plan of Care  Outcome: Progressing     Problem: Diabetes Comorbidity  Goal: Blood Glucose Level Within Targeted Range  Outcome: Progressing     Problem: Violence Risk or Actual  Goal: Anger and Impulse Control  Outcome: Progressing  Intervention: Minimize Safety Risk  Recent Flowsheet Documentation  Taken 2/27/2023 0200 by Fara Mason RN  Enhanced Safety Measures: bed alarm set     Problem: Oral Intake Inadequate  Goal: Improved Oral Intake  Outcome: Progressing     Problem: Wound Healing Progression  Goal: Optimal Wound Healing  Outcome: Progressing  Intervention: Promote Wound Healing  Recent Flowsheet Documentation  Taken 2/27/2023 0200 by Fara Mason RN  Activity Management:   activity adjusted per tolerance   activity encouraged   Goal Outcome Evaluation:  Pt slept well overnight awake only with cares. Pt has been npo overnight for possible EGD today. Pt denied pain.

## 2023-02-28 NOTE — PROGRESS NOTES
[facility]  Palliative Care Daily Progress Note       Recommendations & Counseling     Encounter for palliative care  Psychosocial support    Psychosocial support was provided to patient and/or family.    Prognosis: If does not have more nutritional support, may be at risk of further decline, recurrent infection, poor wound healing. Did discuss informational hospice (encouraged consideration of this) and hospice in generally as a resource if wanted eligibility evaluation and awareness as a resource.    Palliative performance scale (PPS): 40    CODE status: FULL (did briefly discuss this today, encouraged further discussion).    Goals of Care: Restorative at this time. Pt has said to her dtr that she hoped to get better, get out of care facilities and be at home, with her dtr Shantelle, and then move to [place] to be with extended family (no specific family identified yet). Pt and dtr aware of pts poor nutrition. Current plan is for an EGD in the near future. Did discuss PEG for additional nutrition support, if needed. Pt is unsure if she would want this. Did introduce the option for an informational hospice consult, if does not want more aggressive nutritional support.   o Surrogate: Today, clarified with pt (is oriented-to the season, location, president, date of birth). Wishes both of her dtrs could collaborate/work together. But aware of some tension. Named her dtr Valerie as a 1st health care agent, then her dtr Shantelle (this was witnessed by BRANDY Monroy) Made 4 attempts to reach dtr Shantelle (3 in advance and one during this meeting, no answer; dtr Valerie verified was the correct number) Did ask her wish and transcribed it for her that she would like her dtrs to work together but if ok with naming as above and if one daughter cannot be reached, the other dtr can make decisions.  Notary came but sounds as though the HCD has not been finalized yet (if heard accurately).   Addendum:  Got a call back from dtr Shantelle Tuesday afternoon, after this meeting.Reviewed the above. She is frustrated with this development and said she has been the main HCA. No HCD done before. Did review that pt seemed to have capacity to identify her health care agents and certainly wanted her to be a part of this meeting (3 advance phone calls and one during the actual meeting time). Asked Shantelle if she can join at 1100 meeting tomorrow. She is unable, but can make 130 (this provider has a different family meeting from 100-200 PM). Concluded that certainly had tried very hard to reach her in advance of this meeting today and wanted her to be present and attend in this discussion. It does need to be discussed further. Reached out to risk management now for further recommendations (cora and LVADRYAN).  Spoke with Nursing Supervisor re: the situation, to be apprised/in the loop, and connect with the floor (kiera, and RN, as dtr may be coming in this evening). Sent a message to Hospitalist to apprise of situation too.    Disposition:Believe plan is to return to [facility] where she resides, per CM.    Symptoms    Pain, denies today.  o Healing Touch ordered    Palliative medicine will follow up with pt and family at 1100 AM tomorrow to continue goals of care discussions.    Total time spent was 90 minutes regarding health care agents, health care directive naming health care agents, on the date of the encounter. These recommendations were given to the primary team via [facility].    LAMONT Duran, FNP-BC, PMHNP-BC  Palliative Care Nurse Practitioner  [facility]  [phone]      During regular M-F work hours -- if you are not sure who specifically to contact -- please contact us by calling [phone].       Assessments:  Cristal Preciado is a 72 year old female with CAD, cognitive impairment but not diagnosed with dementia prior to admission and multiple episodes of acute encephalopathy, visual  impairment (glaucoma and diabetic retinopathy), IDDM2 w neuropathy, retinopathy, nephropathy, GERD< HTN, HLD, prior stroke (left her wheelchair bound), major depression, CKD2, semi-recent Covid infection (12/29/22).     Cristal moved into a new SNF ([facility]) in December.  She has had a longstanding sacral decubitus ulcer and presented with infected ulcer and concern for necrotizing fasciitis based on elevated lactic acid, leukocytosis, etc; she underwent debridement on 2/17/23 with Dr Ragsdale.  Over the past week, Cristal has refused to eat much and is not meeting nutritional needs.         Interval History:     Chart review/discussion with unit or clinical team members:   Hospitalist note from yesterday re: nutrition:  Moderate malnutrition, weight loss, failure to thrive:   Malnutrion status as determined by dietitian, see their notes for details.   Patient was admitted from 2/1-2/3/23 for evaluation of weight loss, failure to thrive and decreased appetite. GI was consulted. Per consult note on 2/3/23, the above issues are related to patient's multiple comorbidities and age rather than an acute GI process. Family demanded an EGD to be done. Outpatient EGD was scheduled on 3/1/23.  After this admission, has been eating little or none. The etiology is multifactorial. Her poor nutrition status affects her wound healing. Has ongoing discussion about tube feed.   - Patient has history of oropharyngeal dysphagia. Per SLP evaluation on 2/16/23, recommends level 4/pureed textures and thin liquids. Per family, patient does not like puree diet and that would contribute to her poor appetite. Patient wanted to upgrade to soft bite size diet with thin liquid on 2/21.  - Per family, patient's appetite could be affected by mood easily. Brother recently passed away, which affects her mood to eat. Her daughter Valerie agrees to have a trial of Remeron (started 2/26).   - Patient defers the decision about tube feed to her children. The initial  plan was to place a temporary NJ feeding tube until the family make decision on PEG tube. Discussed with her daughter Carolina, they would like to hold on the NJ tube placement. They feel patient has shown signs of increased oral intake when family bring in food from home. Family has been bringing in patient's favorite food for the past few days. However, patient did not really eat much.  Discussed with nursing staff to document the amount of food patient eats.  Continue calorie counts. Family will have a conference about PEG placement tomorrow.  - Per Valerie, she still wants the EGD to be done since patient has a history of ulcer. Consulted GI.    Per patient or family/caregivers today:  Today, largely focused on health care agents, yet also discussed her poor nutrition, plan for EGD to assess for ulcer and thoughts on a feeding tube (PEG). Pt is inclined to not want a feeding tube. Tomorrow, will discuss further.     Key Palliative Symptoms:  SOB-None  Pain-Not bad  Nausea-None reported. Does not have much of an appetite.            Review of Systems:     Besides above, an addl ROS was not done today.           Medications:     I have reviewed this patient's medication profile and medications during this hospitalization.    Noted meds: Reviewed           Physical Exam:   Vitals were reviewed  Temp: 97.9  F (36.6  C) Temp src: Oral BP: 133/62 Pulse: 53   Resp: 18 SpO2: 99 % O2 Device: None (Room air)    General: Not in acute distress.  Head: Atraumatic. Normocephalic.   Eyes: Anicteric without injection. Eyes conjugate. Extraocular movements intact.  Ear, Nose, and Throat: Mouth pink and moist without lesions. Neck without overt masses.  Pulmonary: Unlabored. Speaking in full sentences.  Cardiovascular: No overt jugular venous distension. Non-edematous.  Abdomen: Non-distended.   Skin: Warm. Dry. No bruises or rashes noted.  Musculoskeletal: Joints of hand normal. Muscle bulk and tone normal in UE and  LE.  Neuro: Speech fluent. Face symmetric. No focal neurologic deficit noted.  Psych: Normal mood and affect. Sensorium, gross memory, thought processes, and fund of knowledge intact.            Data Reviewed:       Reviewed recent labs, comments:     [facility]   Lab 02/28/23  0818 02/28/23  0615 02/28/23  0210 02/27/23 2040 02/27/23 2020 02/27/23  1955 02/27/23  1637 02/27/23  0622 02/27/23  0509 02/26/23  0833 02/26/23  0537 02/25/23  0856 02/25/23  0555 02/23/23  0820 02/23/23  0722 02/22/23  0812 02/22/23  0557   NA  --   --   --   --   --   --   --   --   --   --   --   --   --   --   --   --  135*   POTASSIUM  --  3.6  --   --   --   --   --   --  3.9  --  4.0  --  4.3   < > 4.4  --  4.3  4.2   CHLORIDE  --   --   --   --   --   --   --   --   --   --   --   --   --   --   --   --  105   CO2  --   --   --   --   --   --   --   --   --   --   --   --   --   --   --   --  23   ANIONGAP  --   --   --   --   --   --   --   --   --   --   --   --   --   --   --   --  7   *  --  100*  --  105*  --  120*   < >  --    < >  --    < >  --    < >  --    < > 107*   BUN  --   --   --   --   --   --   --   --   --   --   --   --   --   --   --   --  8.0   CR  --   --   --   --   --   --   --   --   --   --   --   --  0.66  --   --   --  0.76   GFRESTIMATED  --   --   --   --   --   --   --   --   --   --   --   --  >90  --   --   --  84   OLAF  --   --   --   --   --   --   --   --   --   --   --   --   --   --   --   --  8.0*   MAG  --  1.9  --  2.1  --  2.1  --   --  1.4*  --  1.6*   < > 1.2*   < > 1.9  --  1.4*   PROTTOTAL  --   --   --   --   --   --   --   --   --   --   --   --   --   --  4.4*  --   --    ALBUMIN  --   --   --   --   --   --   --   --   --   --   --   --   --   --  0.9*  --   --    BILITOTAL  --   --   --   --   --   --   --   --   --   --   --   --   --   --  0.5  --   --    ALKPHOS  --   --   --   --   --   --   --   --   --   --   --   --   --   --  173*  --   --    AST  --   --   --    --   --   --   --   --   --   --   --   --   --   --  31  --   --    ALT  --   --   --   --   --   --   --   --   --   --   --   --   --   --  17  --   --     < > = values in this interval not displayed.     CBC  Recent Labs   Lab 02/28/23  0615 02/23/23  0523   WBC 4.8 6.2   RBC 2.75* 2.94*   HGB 7.8* 8.4*   HCT 25.6* 27.4*   MCV 93 93   MCH 28.4 28.6   MCHC 30.5* 30.7*   RDW 15.9* 15.9*    230     INRNo lab results found in last 7 days.  Arterial Blood GasNo lab results found in last 7 days.        TTS: I have personally spent a total of 50 minutes  today on the unit in review of medical record, consultation with the medical providers and assessment of patient, with more than 50% of this time spent in counseling, coordination of care, and discussion with an additional 30 minutes in a prolonged family meeting re: health care agents, short health care directive, naming HCAs, goals of care, diagnostic tests, nutrition wishes, prognosis, symptom management, risks and benefits of management options, emotional support and development of plan of care. Total time: 80 minutes.     LAMONT Duran, FNP-BC, PMHNP-BC  Palliative Care Nurse Practitioner  [facility]  [phone]      During regular M-F work hours -- if you are not sure who specifically to contact -- please contact us by calling [phone].

## 2023-02-28 NOTE — CARE CONFERENCE
Care Management Follow Up    Length of Stay (days): 12    Expected Discharge Date: 2023     Concerns to be Addressed: discharge planning     Patient plan of care discussed at interdisciplinary rounds: Yes    Anticipated Discharge Disposition: Long Term Care     Anticipated Discharge Services: Other (see comment) (nursing care services, assistance with ADLs, care and supervision)  Anticipated Discharge DME: None    Patient/family educated on Medicare website which has current facility and service quality ratings: yes  Education Provided on the Discharge Plan:    Patient/Family in Agreement with the Plan: yes    Referrals Placed by CM/SW: Post Acute Facilities  Private pay costs discussed: Not applicable    Additional Information:  Chart reviewed.    attended a goals of care meeting with patient, her daughter Valerie and palliative care provider. Daughter Alexandra called and message left by palliative care provider, no answer.   Palliative care discussed nutrition, feeding tube, wounds.   Patient able to answer questions accurately- her , current season, president, current location.    Patient alert and times and sleeping during other times of the meeting. She identifies both of her daughters as being her primary care contacts for decision making. Daughter Valerie acknowledges there are family dynamics in play, she and her sister Alexandra do not communicate well and there are disagreements.    Risk management may need to be consulted for further direction on care of patient.     Daughters:  Valerie ([phone])  Alexandra ([phone])    Patient resides at [facility] where she shares a room with her daughter who is also in LTC. Patient identifies returning to her room with her daughter as a goal.      Jenny Monroy, DUKESW

## 2023-02-28 NOTE — PLAN OF CARE
Problem: Oral Intake Inadequate  Goal: Improved Oral Intake  2/27/2023 2030 by Cece Mari, RN  Outcome: Not Progressing  Patient refused all meals this shift.    Problem: Wound Healing Progression  Goal: Optimal Wound Healing  2/27/2023 2031 by Cece Mari, RN  Outcome: Progressing  Wound vac changed by wound RN.    Heels offloaded, Iodine applied to bilateral heel eschar.  Low air loss mattress, encouraged repositioning in bed.    Problem: Pain Acute  Goal: Optimal Pain Control and Function  2/27/2023 2031 by Cece Mari, RN  Outcome: Progressing  Patient consistently rates generalized pain 10/10.  PRN Oxycodone/ Robaxin/ Tylenol given.  Scheduled Voltaren gel.    MOSES Hoover, MOSES

## 2023-02-28 NOTE — PROVIDER NOTIFICATION
Patient refused all eye drops, no reason given.    Hospitalist, Dr. Urbano, informed.    Cece Mari RN

## 2023-02-28 NOTE — PROGRESS NOTES
GI Progress Note  Cristal Preciado  -69     Subjective:   Reviewed note from palliative medicine.  Patient's daughter, Valerie, plans on being at the hospital tomorrow when the patient has the EGD.  Valerie is first healthcare agent.    Patient still with no appetite and minimal oral intake.  This is despite her daughter bringing in favorite foods for her to try.    Passed a bowel movement today.     Objective:   /63 (BP Location: Left arm, Patient Position: Left side, Cuff Size: Adult Regular)   Pulse 55   Temp 97.9  F (36.6  C) (Oral)   Resp 18   Wt 66.8 kg (147 lb 4.3 oz)   SpO2 99%   BMI 23.07 kg/m    Body mass index is 23.07 kg/m .   Gen: No acute distress  Cardio: RRR  GI: Non-distended, BS positive, soft, non-tender. No guarding.    Laboratory  Recent Labs   Lab 02/28/23  0615 02/23/23  0523   WBC 4.8 6.2   RBC 2.75* 2.94*   HGB 7.8* 8.4*   HCT 25.6* 27.4*   MCV 93 93   MCH 28.4 28.6   MCHC 30.5* 30.7*   RDW 15.9* 15.9*    230      Recent Labs   Lab 02/22/23  0557   *   CO2 23   BUN 8.0     Recent Labs   Lab 02/23/23  0722   ALKPHOS 173*   AST 31   ALT 17     Lab Results   Component Value Date    INR 1.12 03/28/2013    INR 0.95 09/14/2010    INR 1.02 06/28/2007    TROPONIN 0.04 03/19/2008    TROPI <0.012 03/20/2008    TROPI 0.013 03/19/2008       No results found.      Assessment:   Ms Preciado is a 72 year old female with past medical history significant for CVA, dementia, type 2 diabetes, diabetic neuropathy, CKD, coronary artery disease, bedbound with chronic sacral decubitus ulcer, malnutrition, failure to thrive admitted February 16 with infected sacral decubitus ulcer with possible necrotizing fasciitis, sepsis.     Malnutrition/failure to thrive.  Cause unclear, though suspect pain is likely playing a role.  Question underlying depression with loss of several relatives.  Note SLP evaluation shows oral pharyngeal dysphagia.  While issues outside of GI process are likely playing  a role, can not exclude GI contribution.  Pt was scheduled for outpatient EGD on 3/1.  We have offered to do this as an inpatient we will do this tomorrow.  Her daughter, Valerie, is in favor of this.  Note family is considering PEG procedure.  If EGD does not show anything to explain the poor appetite/FTT, it is unlikely patient will be able to meet her nutritional needs in the absence of feeding tube/nutrition support.    Patient Active Problem List   Diagnosis     Cataract     CAD (coronary artery disease)     Diabetic nephropathy (H)     Diabetic neuropathy (H)     Diabetic retinopathy (H)     GERD (gastroesophageal reflux disease)     Glaucoma     Hypertension     Hyperlipidemia     H/O: stroke     Anemia     Seasonal allergies     Depression     Tubular adenoma of colon     Leg weakness     Dermatitis, seborrheic     Eczematous dermatitis     History of coronary artery disease     Venous stasis dermatitis of both lower extremities     Chronic dermatitis of hands     Neoplasm of uncertain behavior of skin     S/P hysterectomy     Hypomagnesemia     Gout     DM type 2 w Neuro/Retin/Nephr -- hgb A1C 7.6 on 1/3/23     Stage 3 chronic kidney disease, unspecified whether stage 3a or 3b CKD (H)     Acute chest pain     Chronic kidney disease, stage 2 (mild)     Acute metabolic encephalopathy     Oropharyngeal dysphagia     Sacral decubitus ulcer     Dehydration     Confusion     Adult failure to thrive     Acute kidney injury (H)     Cognitive impairment Consistent with Mild Dementia     Polyneuropathy associated with underlying disease (H)     Acute cystitis with hematuria     Delirium     Protein-calorie malnutrition (H)     Wound infection     Sacral wound, initial encounter     Severe sepsis (H)     Hypokalemia     Hypercalcemia     Anemia due to blood loss, acute     Pressure injury of contiguous region involving buttock and hip, stage 4 (H)     Necrotizing fasciitis (H)     Hypoglycemia     Pressure ulcer of  both heels, unstageable (H)      Plan:   1.  Continue PPI daily.  2.  Plan on EGD 3/1/2023 in OR with MAC sedation.  3.  Noted plans for palliative medicine to continue discussion with patient and family.    Thank you.  Shade Kuhn PA-C  [facility]  [phone]

## 2023-02-28 NOTE — PROGRESS NOTES
"Patient adamantly refused to let us turn and reposition her tonight.  She also refused to eat, drink, and take her eye drops.  She said \"I just don't want that.\"  She denied pain but took her scheduled bedtime medications crushed in pudding.  "

## 2023-02-28 NOTE — PROGRESS NOTES
Calorie Count Note     Date of Calorie Count:      Diet RX:  Consistent Carb (60g/meal), soft and bite sized diet.  Thin liquids. + 2 magic cups (with lunch and dinner)     Meals Recorded:  : - no ticket found; information gathered from nurse's and PA's notes  - Per nurse: pt refuse to eat, drink   - Per PA, daughter brought favorite foods from home, though pt still not interested in eating. Poor appetite dates back to December when pt had COVID and lost multiple relatives over the past several months.    mornin mL of orange juice with pain med - pt refused food and fluid beside orange juice.     Comments: Pt's oral intake remains very low. Current oral intake is unable to meet basic nutrition needs/increased nutrition needs for wound healing.      New Findings:  - Pt was scheduled for outpatient EGD on 3/1 - daughter in favor of the idea. And family is considering PEG procedure.      Future Implementation:  When TF is appropriate,   Initiate EN support via TF (possibly through PEG):   Initiate promote w/ fiber @ 10 mL/hr. Once okay to advance OR K+/= 3, Mag ++ >/= 1.5, and phos >/+ 1.9, advance by 15 mL q 8 hrs as tolerated to eventually goal rate of 75 mL/hr.   - 30 mL q 4hrs fluid flushes for tube patency. Fluid restriction or addition fluids adjustments per MD.   - Given wound and suspected long term poor oral intake PTA & minimal oral intake during the admit, add 2 packs of ankit and multivitamin to feeds daily.      Monitor ability to advance to goal EN support,  Promote w/ fiber @ goal of 75 mL/hr (1800 mL/hr), will provides 1960 kcal (27.4 kcal/kg), 117 g protein (1.78 g pro/kg), 1496 mL free water, 248 g CHO, and 25 g fiber daily.       - Given CKD past medical Hx, monitoring renal lab     Plan:   - Consider 500 mg vitamin C daily for wound healing and repletion.    - If consistent with goals of care consider appetite stimulant, nutrition support when appropriate.      Ira (Mena)  Thee Dietetic Intern

## 2023-02-28 NOTE — PROGRESS NOTES
[facility]    Medicine Progress Note - Hospitalist Service    Date of Admission:  2/16/2023    Assessment & Plan     Cristal Preciado is a 72 year old female with h/o bedbound nursing home resident, sacral decubitus ulcer (present on admission), CVA, dementia, neuropathy, DM2, CKD, CAD, failure to thrive admitted on 2/16/2023 with infected new sacral decubitus ulcer with possible necrotizing fasciitis.      Severe sepsis secondary to infected stage IV sacral and right greater trochanter decubitus ulcers, necrotizing fasciitis:  On admission, sepsis criteria met with leukocytosis (WBC 14.1), tachycardia and lactic acidosis (Lactic acid 6.4). Source being infected infected stage IV sacral and right greater trochanter decubitus ulcers.   Received I&D by Dr. Ragsdale on 2/17. Found gross necrosis of the subcutaneous tissues and then fascia extending all the way down to the sacrum bone.  Cultures growing Proteus, E.coli and Enterococcus. Blood culture has no growth.   - ID consulted and input appreciated. Initially treated with Vancomycin, clindamycin and Zosyn. Now on Zosyn per culture sensitivity.  Plan:  -Continue with Zosyn IV.  Per ID, plan for 4-6 weeks of IV Zosyn. PICC line in right arm.  - Wound VAC placed. Continue wound care.     Moderate malnutrition, weight loss, failure to thrive:   Malnutrion status as determined by dietitian, see their notes for details.   Patient was admitted from 2/1-2/3/23 for evaluation of weight loss, failure to thrive and decreased appetite. GI was consulted. Per consult note on 2/3/23, the above issues are related to patient's multiple comorbidities and age rather than an acute GI process. Family demanded an EGD to be done. Outpatient EGD was scheduled on 3/1/23.  After this admission, has been eating little or none. The etiology is multifactorial. Her poor nutrition status affects her wound healing. Has ongoing discussion about tube feed.   - Patient has history  of oropharyngeal dysphagia. Per SLP evaluation on 2/16/23, recommends level 4/pureed textures and thin liquids. Per family, patient does not like puree diet and that would contribute to her poor appetite. Patient wanted to upgrade to soft bite size diet with thin liquid on 2/21.  - Per family, patient's appetite could be affected by mood easily. Brother recently passed away, which affects her mood to eat. Her daughter Valerie agrees to have a trial of Remeron (started 2/26).   - Patient defers the decision about tube feed to her children. The initial plan was to place a temporary NJ feeding tube until the family make decision on PEG tube.  Previous hospitalist discussed with her daughter Court and Valerie, they would like to hold on the NJ tube placement. They feel patient has shown signs of increased oral intake when family bring in food from home. Family has been bringing in patient's favorite food for the past few days. However, patient did not really eat much.  Discussed with nursing staff to document the amount of food patient eats.  Continue calorie counts. Family will have a conference about PEG placement tomorrow.  - Per Valerie, she still wants the EGD to be done since patient has a history of ulcer. Consulted GI.  Plan:  -GI plans EGD 3/1  -Continue with PPI by GI     Type II DM with hypoglycemia: HgbA1c on 1/3/2023 was 7.6 %. Patient is refusing to eat and has had numerous hypoglycemic episodes. PTA lantus 20 units QAM and Novolog sliding scale. PTA Victoza and metformin on hold.  - On D5 drip. Lantus reduced to 5 units subcu every morning. Novolog sliding scale.  - Hypoglycemic protocol     HTN: Blood pressure is well controlled. Continue PTA metoprolol.    History of CAD: on metoprolol and lipitor.      Acute blood loss anemia: Hgb was 5.7 after surgery. Transfused 2 units of packed RBCs  - Continue to monitor and transfuse to keep Hgb > 7. Hemoglobin has been stable.     UTI: Continue with  Zosyn IV.  Urine culture grew mixed urogenital brittani     Hyponatremia: Mild. Likely due to hypovolemia and poor oral intake. Resolved.      Hypercalcemia: resolved may have been due to dehydration     Hypokalemia: replacement protocol      Hypomagnesemia: Replace per protocol     Pressure ulcer on bilateral heels, unstageable: Present on admission. L heel: 4  x 5.5 cm  and R heel: 2  x 2 cm, the skin surface is black.  - Wound care consulted. On offload boots.     History of ulcer, GERD: on PPI.    Hyperlipidemia: on statin.     Goal of care: Per chart review, patient has been a nursing home resident in [facility] since Oct 2011 due to lower extremity weakness from a stroke. She has become bed bound and wheelchair bound for 5 years. Had 4 admissions for the past two months for evaluation of UTI, encephalopathy, malnutrition, and weight loss.   - Palliative care consulted. Plan a family conference   - Heathcare representatives: Patient has been able to make decision herself. However, for the past few days, she has become agitated easily and often refuses to answer questions. She has indicated to let her children to make decision for her. She previosuly told staff that she wants Court to be the primary representative. However, her other daughter told the provider that there are some disagreement among the siblings. She is not aware about the care conference coming up. Valerie states that she has been the primary care giver for her mother for a long time and knows her mother the best. She would like to be the primary contact and patient's primary healthcare representative. Patient's son, Camron, has been banned from the hospital due to threatening behaviors towards staff. Her daughters Court and Valerie have been visiting her. Patient told this writer today that both Court and Valerie can make decisions for her.   Discussed with palliative care today. Plan to ask patient to sign ACP documents to designate  healthcare representatives when she is still able to make decisions.     Diet: Combination Diet Moderate Consistent Carb (60 g CHO per Meal) Diet; Soft and Bite Sized Diet (level 6); Thin Liquids (level 0)  Calorie Counts  Snacks/Supplements Adult: Magic Cup; With Meals    DVT Prophylaxis: Pneumatic Compression Devices  Cornejo Catheter: Not present  Lines: PRESENT      PICC 02/17/23 Triple Lumen Right Basilic Vascular access-Site Assessment: WDL      Cardiac Monitoring: None  Code Status: Full Code      Clinically Significant Risk Factors            # Hypomagnesemia: Lowest Mg = 1.4 mg/dL in last 2 days, will replace as needed   # Hypoalbuminemia: Lowest albumin = 0.9 g/dL at 2/23/2023  7:22 AM, will monitor as appropriate          # DMII: A1C = 7.6 % (Ref range: <5.7 %) within past 6 months    # Moderate Malnutrition: based on nutrition assessment     Disposition Plan      Expected Discharge Date: 03/03/2023    Discharge Delays: Placement - LTC  IV Medication - consider oral or Home Infusion  Procedure Pending (enter procedure & time in comments)  Destination: long-term care facility  Discharge Comments: needs tube feeding, EGD 3/1 in OR  wants new LTC  are conference       Deshaun Urbano MD  Hospitalist Service  [facility]  Securely message with [employer] (more info)  Text page via [employer] Paging/Directory   ______________________________________________________________________    Interval History   No new events overnight per RN.  Patient is in bed comfortably.  Was seen by GI they plan for EGD tomorrow.  She remains on IV antibiotics and PPI.    Physical Exam   Vital Signs: Temp: 97.4  F (36.3  C) Temp src: Oral BP: 132/58 Pulse: 55   Resp: 18 SpO2: 99 % O2 Device: None (Room air)    Weight: 147 lbs 4.28 oz    General appearance: Elderly female lying in bed comfortably in no distress.  Not in acute distress  HEENT: PERRL, EOMI  Lungs: Clear breath sounds in bilateral lung  fields  Cardiovascular: Regular rate and rhythm, normal S1-S2  Abdomen: Soft, non tender, no distension, normal bowel sound  Musculoskeletal: No joint swelling  Skin: Wound vac on right sacrum and right hip. Pressure ulcers on left and right heels.  Please see wound care/nursing note for complete skin exam.  Psychiatry, awake alert oriented to person.    Medical Decision Making       40 minutes spend by me on the date of service doing chart review, history, exam, documentation & further activities per the note.       Data    Lab Results   Component Value Date    WBC 4.8 02/28/2023    WBC 7.8 08/06/2019     Lab Results   Component Value Date    RBC 2.75 02/28/2023    RBC 2.97 08/06/2019     Lab Results   Component Value Date    HGB 7.8 02/28/2023    HGB 9.3 08/06/2019     Lab Results   Component Value Date    HCT 25.6 02/28/2023    HCT 29.9 08/06/2019     No components found for: MCT  Lab Results   Component Value Date    MCV 93 02/28/2023     08/06/2019     Lab Results   Component Value Date    MCH 28.4 02/28/2023    MCH 31.3 08/06/2019     Lab Results   Component Value Date    MCHC 30.5 02/28/2023    MCHC 31.1 08/06/2019     Lab Results   Component Value Date    RDW 15.9 02/28/2023    RDW 12.5 08/06/2019     Lab Results   Component Value Date     02/28/2023     08/06/2019     Last Comprehensive Metabolic Panel:  Sodium   Date Value Ref Range Status   02/22/2023 135 (L) 136 - 145 mmol/L Final   08/06/2019 130 (L) 133 - 144 mmol/L Final     Potassium   Date Value Ref Range Status   02/28/2023 3.6 3.4 - 5.3 mmol/L Final   10/28/2021 3.7 3.5 - 5.0 mmol/L Final   08/06/2019 4.8 3.4 - 5.3 mmol/L Final     Chloride   Date Value Ref Range Status   02/22/2023 105 98 - 107 mmol/L Final   10/28/2021 103 98 - 107 mmol/L Final   08/06/2019 104 94 - 109 mmol/L Final     Carbon Dioxide   Date Value Ref Range Status   08/06/2019 19 (L) 20 - 32 mmol/L Final     Carbon Dioxide (CO2)   Date Value Ref Range Status    02/22/2023 23 22 - 29 mmol/L Final   10/28/2021 22 22 - 31 mmol/L Final     Anion Gap   Date Value Ref Range Status   02/22/2023 7 7 - 15 mmol/L Final   10/28/2021 13 5 - 18 mmol/L Final   08/06/2019 8 3 - 14 mmol/L Final     Glucose   Date Value Ref Range Status   10/28/2021 129 (H) 70 - 125 mg/dL Final   08/06/2019 184 (H) 70 - 99 mg/dL Final     GLUCOSE BY METER POCT   Date Value Ref Range Status   02/28/2023 113 (H) 70 - 99 mg/dL Final     Urea Nitrogen   Date Value Ref Range Status   02/22/2023 8.0 8.0 - 23.0 mg/dL Final   10/28/2021 33 (H) 8 - 22 mg/dL Final   08/06/2019 24 7 - 30 mg/dL Final     Creatinine   Date Value Ref Range Status   02/25/2023 0.66 0.51 - 0.95 mg/dL Final   08/06/2019 0.98 0.52 - 1.04 mg/dL Final     GFR Estimate   Date Value Ref Range Status   02/25/2023 >90 >60 mL/min/1.73m2 Final     Comment:     eGFR calculated using 2021 CKD-EPI equation.   06/17/2021 47 (L) >60 mL/min/1.73m2 Final   08/06/2019 59 (L) >60 mL/min/[1.73_m2] Final     Comment:     Non  GFR Calc  Starting 12/18/2018, serum creatinine based estimated GFR (eGFR) will be   calculated using the Chronic Kidney Disease Epidemiology Collaboration   (CKD-EPI) equation.       Calcium   Date Value Ref Range Status   02/22/2023 8.0 (L) 8.8 - 10.2 mg/dL Final   08/06/2019 8.8 8.5 - 10.1 mg/dL Final     Bilirubin Total   Date Value Ref Range Status   02/23/2023 0.5 <=1.2 mg/dL Final   03/06/2014 0.2 0.2 - 1.3 mg/dL Final     Alkaline Phosphatase   Date Value Ref Range Status   02/23/2023 173 (H) 35 - 104 U/L Final   03/06/2014 127 40 - 150 U/L Final     ALT   Date Value Ref Range Status   02/23/2023 17 10 - 35 U/L Final   10/12/2015 39 0 - 50 U/L Final     AST   Date Value Ref Range Status   02/23/2023 31 10 - 35 U/L Final     Comment:     Specimen is hemolyzed which can falsely elevate AST. Analysis of a non-hemolyzed specimen may result in a lower value.   03/06/2014 35 0 - 45 U/L Final     Imaging results  reviewed over the past 24 hrs:   No results found for this or any previous visit (from the past 24 hour(s)).

## 2023-02-28 NOTE — PLAN OF CARE
Pt vss on RA, A&Ox2 person and sometimes place, turned and repositioned overnight w/linen change Ax2. Pain 10/10 managed with 2.5 mg oxycodone. Care conference in place for 2/28 @ 1100. Refusing food and fluids except roughly 60 ml of orange juice with pain med.  Problem: Plan of Care - These are the overarching goals to be used throughout the patient stay.    Goal: Optimal Comfort and Wellbeing  Outcome: Progressing  Intervention: Monitor Pain and Promote Comfort  Recent Flowsheet Documentation  Taken 2/28/2023 0239 by Michael Keller, RN  Pain Management Interventions: medication (see MAR)  Intervention: Provide Person-Centered Care  Recent Flowsheet Documentation  Taken 2/28/2023 0239 by Michael Keller, RN  Trust Relationship/Rapport:   care explained   choices provided   emotional support provided     Problem: Pain Acute  Goal: Optimal Pain Control and Function  Outcome: Progressing  Intervention: Develop Pain Management Plan  Recent Flowsheet Documentation  Taken 2/28/2023 0239 by Michael Keller, RN  Pain Management Interventions: medication (see MAR)  Intervention: Prevent or Manage Pain  Recent Flowsheet Documentation  Taken 2/28/2023 0239 by Michael Keller, RN  Medication Review/Management: medications reviewed   Goal Outcome Evaluation:

## 2023-03-01 NOTE — PROGRESS NOTES
"  GASTROENTEROLOGY PROGRESS NOTE     SUBJECTIVE   \"I want to go home.\" Reports being comfortable at this time. The patient's daughter is at the bedside and confirms the patient does not want to pursue upper endoscopy.        OBJECTIVE     Vitals Blood pressure (!) 140/61, pulse 63, temperature 98.1  F (36.7  C), temperature source Oral, resp. rate 16, weight 68.4 kg (150 lb 12.7 oz), SpO2 98 %, not currently breastfeeding.          Physical Exam   General: awake, alert, responds appropriately    Cardiovascular: S1S2    Chest: lungs are clear     Abdomen: +bs, soft, not tender          LABORATORY    ELECTROLYTE PANEL   Recent Labs   Lab 03/01/23  1225 03/01/23  0851 03/01/23  0803 03/01/23  0613 02/28/23  0818 02/28/23  0615 02/27/23  0622 02/27/23  0509 02/25/23  0856 02/25/23  0555   NA  --   --   --  135*  --   --   --   --   --   --    POTASSIUM  --   --   --  3.9  --  3.6  --  3.9   < > 4.3   CHLORIDE  --   --   --  108*  --   --   --   --   --   --    CO2  --   --   --  23  --   --   --   --   --   --    * 175* 167* 154*   < >  --    < >  --    < >  --    CR  --   --   --  0.63  --   --   --   --   --  0.66   BUN  --   --   --  5.0*  --   --   --   --   --   --     < > = values in this interval not displayed.      HEMATOLOGY PANEL   Recent Labs   Lab 03/01/23  0613 02/28/23  0615 02/23/23  0523   HGB 8.1* 7.8* 8.4*   MCV 92 93 93   WBC 5.0 4.8 6.2    208 230      LIVER AND PANCREAS PANEL   Recent Labs   Lab 03/01/23  0613 02/23/23  0722   AST 28 31   ALT 14 17   ALKPHOS 154* 173*   BILITOTAL 0.3 0.5     IMAGING STUDIES        I have reviewed the current diagnostic and laboratory tests.              IMPRESSION   Poor appetite/failure to thrive-- This is a 73 yo female with history of CVA, dementia, type 2 diabetes, diabetic neuropathy, CKD, coronary artery disease, bedbound with chronic sacral decubitus ulcer, malnutrition, failure to thrive admitted February 16 with infected sacral decubitus ulcer " with possible necrotizing fasciitis, sepsis.   Outpatient EGD was scheduled for 3/1/23, but offered to do this as an inpatient-- the patient has declined upper endoscopy and does not want to pursue invasive procedure. Agree with recommendations made by RD to improve nutritional status. Suspect malnutrition is multifactorial.    Oral pharyngeal dysphagia-- follow recommendations outlined by SLP           PLAN   Wound care per primary team, ID, surgery.   Follow recommendations outlined by SLP and RD to optimize nutritional status.   Continue ppi.  Appreciate input from palliative care.   The patient declines upper endoscopy at this time. If the patient wishes to proceed with EGD, please let us know. [facility] will sign off. Call with questions/concerns.         Melissa Mcintyre PA-C  Thank you for the opportunity to participate in the care of this patient.   Please feel free to call me with any questions or concerns.  Phone number [phone].

## 2023-03-01 NOTE — PROGRESS NOTES
Clinical Nutrition Assessment Brief Note     Date:      Diet RX:  Consistent Carb (60g/meal), soft and bite sized diet.  Thin liquids. + 2 magic cups (with lunch and dinner)     Meals Recorded:  Per RN's notes   mornin mL of orange juice with pain med - pt refused food and fluid beside orange juice.   afternoon/night: only applesauce with medications and ice chips     Comments: Pt's oral intake remains very low. Current oral intake is unable to meet basic nutrition needs/increased nutrition needs for wound healing.      New Findings:   EGD pending per RN's note per family/pt's current decision.     Future Implementation:  TF plan pending (see new findings).   If pt precedes FT, recommend TF with following regimen:  Promote w/ fiber @ 10 mL/hr. Once okay to advance OR K+/= 3, Mag ++ >/= 1.5, and phos >/+ 1.9, advance by 15 mL q 8 hrs as tolerated to eventually goal rate of 75 mL/hr.   - 30 mL q 4hrs fluid flushes for tube patency. Fluid restriction or addition fluids adjustments per MD.     - Given wound and suspected long term poor oral intake PTA & minimal oral intake during the admit, recommend 2 packs of ankit and multivitamin to feeds daily.     Monitor ability to advance to goal EN support,  Promote w/ fiber @ goal of 75 mL/hr (1800 mL/hr), will provides 1960 kcal (27.4 kcal/kg), 117 g protein (1.78 g pro/kg), 1496 mL free water, 248 g CHO, and 25 g fiber daily.       - Given CKD past medical Hx, monitoring renal lab      Plan:   - Consider 500 mg vitamin C daily for wound healing and repletion.    - If consistent with goals of care consider appetite stimulant, nutrition support when appropriate.      Ira Kingston (Amber), Dietetic Intern

## 2023-03-01 NOTE — OR NURSING
"I contacted Court Preciado (daughter) as per Cristal Preciado request. I introduced myself and explained that Cristal was in preop area getting ready for a scheduled EGD.     Court stated that her mother never agreed to have this procedure done. I verified with patient and she reported that \"I thought my kids said to do it\". As per right now, patient is refusing to go forward with procedure. Patient's family was made aware and agreed. Dr. Mata was notified.   "

## 2023-03-01 NOTE — ANESTHESIA PREPROCEDURE EVALUATION
Anesthesia Pre-Procedure Evaluation    Patient: Cristal Preciado   MRN: [id number] : 1950        Procedure : Procedure(s):  ESOPHAGOGASTRODUODENOSCOPY (EGD)          Past Medical History:   Diagnosis Date     AION (anterior ischaemic optic neuropathy), left eye     NAION LE     CAD (coronary artery disease) 3/2009    [facility]; Angio  UM- normal coronary arteries     Cataract      CVA (cerebral vascular accident) (H)     admitted at [facility]     on Cymbalta     Diabetes mellitus, type 2 (H)      Diabetic nephropathy (H)      Diabetic neuropathy (H)     severe     Diabetic retinopathy (H)      GERD (gastroesophageal reflux disease)      Hyperlipidemia      Hypertension     ECHO , TDS, NL EF     Infection due to  novel coronavirus 2023     POAG (primary open-angle glaucoma)     adv BE     Seasonal allergies      Tubular adenoma of colon     repeat colonoscopy in       Past Surgical History:   Procedure Laterality Date     CARDIAC CATHETERIZATION       CATARACT EXTRACTION W/ INTRAOCULAR LENS IMPLANT Bilateral       SECTION        SECTION       COLONOSCOPY  7/15/2013    Tubular adenoma; repeat in ;Procedure: COMBINED COLONOSCOPY, SINGLE BIOPSY/POLYPECTOMY BY BIOPSY;;  Surgeon: Don King MD;  Tubular adenoma     COLONOSCOPY N/A 2020    Procedure: COLONOSCOPY;  Surgeon: Sid Amanda MD;  Location: [facility]     CORONARY STENT PLACEMENT  2004    RCA     DAVINCI HYSTERECTOMY TOTAL, BILATERAL SALPINGO-OOPHORECTOMY, COMBINED N/A 2019    Procedure: DaVinci Assisted Total Laparoscopic Hysterectomy, Removal Of Both Tubes And Ovaries;  Surgeon: Linh Cardoso MD;  Location: [facility]     EXTRACAPSULAR CATARACT EXTRATION WITH INTRAOCULAR LENS IMPLANT  11-10-09, 2-9-10    11-10-09 Lt, 2-9-10 Rt; Left eye 2012     HYSTERECTOMY TOTAL ABDOMINAL, BILATERAL SALPINGO-OOPHORECTOMY, COMBINED  2019    Procedure: DaVinci  Assisted Total Laparoscopic Hysterectomy, Removal Of Both Tubes And Ovaries; Surgeon: Linh Cardoso MD; Location: [facility] OR       IRRIGATION AND DEBRIDEMENT TRUNK, COMBINED Right 2023    Procedure: DEBRIDEMENT OF SACRAL ULCER,  DEBRIDEMENT OF RIGHT HIP ULCER;  Surgeon: Shawna Ragsdale MD;  Location: [facility] OR     PICC TRIPLE LUMEN PLACEMENT  2023          STENT, CORONARY, DELORES      RCA      Allergies   Allergen Reactions     Dust Mites Other (See Comments)     Sneezing runny eyes and nose.     Food Allergy Formula Hives     Mountain Dew and Walnuts     Pollen Extract Other (See Comments)     Sneezing runny eyes and nose.      Social History     Tobacco Use     Smoking status: Former     Packs/day: 1.50     Years: 45.00     Pack years: 67.50     Types: Cigarettes     Start date: 1968     Quit date: 3/6/2013     Years since quittin.9     Smokeless tobacco: Never   Substance Use Topics     Alcohol use: Not Currently      Wt Readings from Last 1 Encounters:   23 66.8 kg (147 lb 4.3 oz)        Anesthesia Evaluation            ROS/MED HX  ENT/Pulmonary:     (+) tobacco use, Past use, mild,  COPD,     Neurologic:     (+) delerium, resolved Yes. CVA, TIA,     Cardiovascular:     (+) Dyslipidemia hypertension--CAD --stent-    METS/Exercise Tolerance:     Hematologic:  - neg hematologic  ROS     Musculoskeletal:       GI/Hepatic:     (+) GERD,     Renal/Genitourinary:     (+) renal disease, type: CRI,     Endo:     (+) type II DM, Using insulin, Diabetic complications: nephropathy neuropathy retinopathy.     Psychiatric/Substance Use:     (+) psychiatric history anxiety and depression     Infectious Disease:       Malignancy:       Other:            Physical Exam    Airway        Mallampati: III    Neck ROM: full     Respiratory Devices and Support         Dental       (+) Full dentures      Cardiovascular          Rhythm and rate: regular     Pulmonary           breath sounds clear to  auscultation           OUTSIDE LABS:  CBC:   Lab Results   Component Value Date    WBC 5.0 03/01/2023    WBC 4.8 02/28/2023    HGB 8.1 (L) 03/01/2023    HGB 7.8 (L) 02/28/2023    HCT 26.7 (L) 03/01/2023    HCT 25.6 (L) 02/28/2023     03/01/2023     02/28/2023     BMP:   Lab Results   Component Value Date     (L) 03/01/2023     (L) 02/22/2023    POTASSIUM 3.9 03/01/2023    POTASSIUM 3.6 02/28/2023    CHLORIDE 108 (H) 03/01/2023    CHLORIDE 105 02/22/2023    CO2 23 03/01/2023    CO2 23 02/22/2023    BUN 5.0 (L) 03/01/2023    BUN 8.0 02/22/2023    CR 0.63 03/01/2023    CR 0.66 02/25/2023     (H) 03/01/2023     (H) 03/01/2023     COAGS:   Lab Results   Component Value Date    PTT 28 09/14/2010    INR 1.12 03/28/2013     POC:   Lab Results   Component Value Date     (H) 01/24/2020     HEPATIC:   Lab Results   Component Value Date    ALBUMIN 1.5 (L) 03/01/2023    PROTTOTAL 4.7 (L) 03/01/2023    ALT 14 03/01/2023    AST 28 03/01/2023    ALKPHOS 154 (H) 03/01/2023    BILITOTAL 0.3 03/01/2023     OTHER:   Lab Results   Component Value Date    PH 7.42 02/17/2023    LACT 3.6 (H) 02/16/2023    A1C 7.6 (H) 01/03/2023    OLAF 7.4 (L) 03/01/2023    PHOS 2.1 (L) 08/06/2019    MAG 1.6 (L) 03/01/2023    LIPASE 70 03/19/2008    TSH 0.82 02/03/2023    T4 10.6 10/30/2008    T3 100 04/21/2006    CRP <5.0 07/15/2010     (H) 02/16/2023       Anesthesia Plan                        Consents         - Patient is DNR/DNI Status: No         Postoperative Care            Comments:    Other Comments:                   Aixa Lin MD

## 2023-03-01 NOTE — PROGRESS NOTES
[facility]    Medicine Progress Note - Hospitalist Service    Date of Admission:  2/16/2023    Assessment & Plan     Cristal Preciado is a 72 year old female with h/o bedbound nursing home resident, sacral decubitus ulcer (present on admission), CVA, dementia, neuropathy, DM2, CKD, CAD, failure to thrive admitted on 2/16/2023 with infected new sacral decubitus ulcer with possible necrotizing fasciitis.      Severe sepsis secondary to infected stage IV sacral and right greater trochanter decubitus ulcers, necrotizing fasciitis:  On admission, sepsis criteria met with leukocytosis (WBC 14.1), tachycardia and lactic acidosis (Lactic acid 6.4). Source being infected infected stage IV sacral and right greater trochanter decubitus ulcers.   Received I&D by Dr. Ragsdale on 2/17. Found gross necrosis of the subcutaneous tissues and then fascia extending all the way down to the sacrum bone.  Cultures growing Proteus, E.coli and Enterococcus. Blood culture has no growth.   - ID consulted and input appreciated. Initially treated with Vancomycin, clindamycin and Zosyn. Now on Zosyn per culture sensitivity.  Plan:  - No new changes at this time  - Continue with Zosyn IV.  Per ID, plan for 4-6 weeks of IV Zosyn. PICC line in right arm.  - Wound VAC placed. Continue wound care.     Moderate malnutrition, weight loss, failure to thrive:   Malnutrion status as determined by dietitian, see their notes for details.   Patient was admitted from 2/1-2/3/23 for evaluation of weight loss, failure to thrive and decreased appetite. GI was consulted. Per consult note on 2/3/23, the above issues are related to patient's multiple comorbidities and age rather than an acute GI process. Family demanded an EGD to be done. Outpatient EGD was scheduled on 3/1/23.  After this admission, has been eating little or none. The etiology is multifactorial. Her poor nutrition status affects her wound healing. Has ongoing discussion about  tube feed.   - Patient has history of oropharyngeal dysphagia. Per SLP evaluation on 2/16/23, recommends level 4/pureed textures and thin liquids. Per family, patient does not like puree diet and that would contribute to her poor appetite. Patient wanted to upgrade to soft bite size diet with thin liquid on 2/21.  - Per family, patient's appetite could be affected by mood easily. Brother recently passed away, which affects her mood to eat. Her daughter Valerie agrees to have a trial of Remeron (started 2/26).   - Patient defers the decision about tube feed to her children. The initial plan was to place a temporary NJ feeding tube until the family make decision on PEG tube.  Previous hospitalist discussed with her daughter Court and Valerie, they would like to hold on the NJ tube placement. They feel patient has shown signs of increased oral intake when family bring in food from home. Family has been bringing in patient's favorite food for the past few days. However, patient did not really eat much.  Discussed with nursing staff to document the amount of food patient eats.  Continue calorie counts. Family will have a conference about PEG placement tomorrow.  - Per Valerie, she still wants the EGD to be done since patient has a history of ulcer. Consulted GI.  Plan:  -Patient declined EGD that was scheduled this morning.    -Continue with PPI by GI     Type II DM : HgbA1c on 1/3/2023 was 7.6 %. Patient is refusing to eat and has had numerous hypoglycemic episodes. PTA lantus 20 units QAM and Novolog sliding scale. PTA Victoza and metformin on hold.  - On D5 drip. Lantus reduced to 5 units subcu every morning. Novolog sliding scale.  - Hypoglycemic protocol     HTN: Blood pressure is well controlled. Continue PTA metoprolol.    History of CAD: on metoprolol and lipitor.      Acute blood loss anemia: Hgb was 5.7 after surgery. Transfused 2 units of packed RBCs  - Continue to monitor and transfuse to keep Hgb > 7.  Hemoglobin has been stable.     UTI: Continue with  Zosyn IV. Urine culture grew mixed urogenital brittani     Hyponatremia: Mild. Likely due to hypovolemia and poor oral intake. Resolved.      Hypercalcemia: resolved may have been due to dehydration     Hypokalemia: replacement as needed per protocol. Monitor with BMP     Hypomagnesemia: Replace per protocol     Pressure ulcer on bilateral heels, unstageable: Present on admission. L heel: 4  x 5.5 cm  and R heel: 2  x 2 cm, the skin surface is black.  - Wound care consulted. On offload boots.     History of ulcer, GERD: on PPI.    Hyperlipidemia: on statin.     Goal of care: Per chart review, patient has been a nursing home resident in [facility] since Oct 2011 due to lower extremity weakness from a stroke. She has become bed bound and wheelchair bound for 5 years. Had 4 admissions for the past two months for evaluation of UTI, encephalopathy, malnutrition, and weight loss.   - Palliative care consulted. Plan a family conference   - Heathcare representatives: Patient has been able to make decision herself. However, for the past few days, she has become agitated easily and often refuses to answer questions. She has indicated to let her children to make decision for her. She previosuly told staff that she wants Court to be the primary representative. However, her other daughter told the provider that there are some disagreement among the siblings. She is not aware about the care conference coming up. Valerie states that she has been the primary care giver for her mother for a long time and knows her mother the best. She would like to be the primary contact and patient's primary healthcare representative. Patient's son, Camron, has been banned from the hospital due to threatening behaviors towards staff. Her daughters Court and Valerie have been visiting her. Patient told this writer today that both Court and Valerie can make decisions for her.    Discussed with palliative care today. Plan to ask patient to sign ACP documents to designate healthcare representatives when she is still able to make decisions.     Diet: Snacks/Supplements Adult: Magic Cup; With Meals    DVT Prophylaxis: Pneumatic Compression Devices  Cornejo Catheter: Not present  Lines: PRESENT      PICC 02/17/23 Triple Lumen Right Basilic Vascular access-Site Assessment: WDL      Cardiac Monitoring: None  Code Status: Full Code      Clinically Significant Risk Factors            # Hypomagnesemia: Lowest Mg = 1.6 mg/dL in last 2 days, will replace as needed   # Hypoalbuminemia: Lowest albumin = 0.9 g/dL at 2/23/2023  7:22 AM, will monitor as appropriate          # DMII: A1C = 7.6 % (Ref range: <5.7 %) within past 6 months    # Moderate Malnutrition: based on nutrition assessment     Disposition Plan      Expected Discharge Date: 03/03/2023    Discharge Delays: Placement - LTC  IV Medication - consider oral or Home Infusion  Procedure Pending (enter procedure & time in comments)  Destination: long-term care facility  Discharge Comments: needs tube feeding, EGD 3/1 in OR  wants new LTC  care conference       Deshaun Urbano MD  Hospitalist Service  [facility]  Securely message with [employer] (more info)  Text page via [employer] Paging/Directory   ______________________________________________________________________    Interval History   Patient is in bed comfortably.  This morning he was scheduled to have an EGD but she declined.  Otherwise she states she is just about the same compared to yesterday.  She remains on PPI with wound cares and IV antibiotics.    Physical Exam   Vital Signs: Temp: 98.1  F (36.7  C) Temp src: Oral BP: (!) 140/61 Pulse: 63   Resp: 16 SpO2: 98 % O2 Device: None (Room air)    Weight: 150 lbs 12.71 oz    General appearance: Elderly female lying in bed comfortably in no distress.  Not in acute distress  HEENT: PERRL, EOMI  Lungs: Clear breath sounds in  bilateral lung fields  Cardiovascular: Regular rate and rhythm, normal S1-S2  Abdomen: Soft, non tender, no distension, normal bowel sound  Musculoskeletal: No joint swelling  Skin: Wound vac on right sacrum and right hip. Pressure ulcers on left and right heels.  Please see wound care/nursing note for complete skin exam.  Psychiatry, awake alert oriented to person.    Medical Decision Making       40 minutes spend by me on the date of service doing chart review, history, exam, documentation & further activities per the note.       Data    Lab Results   Component Value Date    WBC 4.8 02/28/2023    WBC 7.8 08/06/2019     Lab Results   Component Value Date    RBC 2.75 02/28/2023    RBC 2.97 08/06/2019     Lab Results   Component Value Date    HGB 7.8 02/28/2023    HGB 9.3 08/06/2019     Lab Results   Component Value Date    HCT 25.6 02/28/2023    HCT 29.9 08/06/2019     No components found for: MCT  Lab Results   Component Value Date    MCV 93 02/28/2023     08/06/2019     Lab Results   Component Value Date    MCH 28.4 02/28/2023    MCH 31.3 08/06/2019     Lab Results   Component Value Date    MCHC 30.5 02/28/2023    MCHC 31.1 08/06/2019     Lab Results   Component Value Date    RDW 15.9 02/28/2023    RDW 12.5 08/06/2019     Lab Results   Component Value Date     02/28/2023     08/06/2019     Last Comprehensive Metabolic Panel:  Sodium   Date Value Ref Range Status   03/01/2023 135 (L) 136 - 145 mmol/L Final   08/06/2019 130 (L) 133 - 144 mmol/L Final     Potassium   Date Value Ref Range Status   03/01/2023 3.9 3.4 - 5.3 mmol/L Final   10/28/2021 3.7 3.5 - 5.0 mmol/L Final   08/06/2019 4.8 3.4 - 5.3 mmol/L Final     Chloride   Date Value Ref Range Status   03/01/2023 108 (H) 98 - 107 mmol/L Final   10/28/2021 103 98 - 107 mmol/L Final   08/06/2019 104 94 - 109 mmol/L Final     Carbon Dioxide   Date Value Ref Range Status   08/06/2019 19 (L) 20 - 32 mmol/L Final     Carbon Dioxide (CO2)   Date  Value Ref Range Status   03/01/2023 23 22 - 29 mmol/L Final   10/28/2021 22 22 - 31 mmol/L Final     Anion Gap   Date Value Ref Range Status   03/01/2023 4 (L) 7 - 15 mmol/L Final   10/28/2021 13 5 - 18 mmol/L Final   08/06/2019 8 3 - 14 mmol/L Final     Glucose   Date Value Ref Range Status   10/28/2021 129 (H) 70 - 125 mg/dL Final   08/06/2019 184 (H) 70 - 99 mg/dL Final     GLUCOSE BY METER POCT   Date Value Ref Range Status   03/01/2023 181 (H) 70 - 99 mg/dL Final     Urea Nitrogen   Date Value Ref Range Status   03/01/2023 5.0 (L) 8.0 - 23.0 mg/dL Final   10/28/2021 33 (H) 8 - 22 mg/dL Final   08/06/2019 24 7 - 30 mg/dL Final     Creatinine   Date Value Ref Range Status   03/01/2023 0.63 0.51 - 0.95 mg/dL Final   08/06/2019 0.98 0.52 - 1.04 mg/dL Final     GFR Estimate   Date Value Ref Range Status   03/01/2023 >90 >60 mL/min/1.73m2 Final     Comment:     eGFR calculated using 2021 CKD-EPI equation.   06/17/2021 47 (L) >60 mL/min/1.73m2 Final   08/06/2019 59 (L) >60 mL/min/[1.73_m2] Final     Comment:     Non  GFR Calc  Starting 12/18/2018, serum creatinine based estimated GFR (eGFR) will be   calculated using the Chronic Kidney Disease Epidemiology Collaboration   (CKD-EPI) equation.       Calcium   Date Value Ref Range Status   03/01/2023 7.4 (L) 8.8 - 10.2 mg/dL Final   08/06/2019 8.8 8.5 - 10.1 mg/dL Final     Bilirubin Total   Date Value Ref Range Status   03/01/2023 0.3 <=1.2 mg/dL Final   03/06/2014 0.2 0.2 - 1.3 mg/dL Final     Alkaline Phosphatase   Date Value Ref Range Status   03/01/2023 154 (H) 35 - 104 U/L Final   03/06/2014 127 40 - 150 U/L Final     ALT   Date Value Ref Range Status   03/01/2023 14 10 - 35 U/L Final   10/12/2015 39 0 - 50 U/L Final     AST   Date Value Ref Range Status   03/01/2023 28 10 - 35 U/L Final   03/06/2014 35 0 - 45 U/L Final     Imaging results reviewed over the past 24 hrs:   No results found for this or any previous visit (from the past 24  hour(s)).

## 2023-03-01 NOTE — PROGRESS NOTES
[facility] Nurse Inpatient Assessment     Consulted for: Buttocks, heels    Patient History (according to provider note(s):      Cristal Preciado is a 72 year old female with PMH significant for known sacral decubitus ulcer (present on admission), CVA, dementia, neuropathy, DM2, CKD, CAD and FTT admitted on 2/16/2023 with infected new sacral decubitus ulcer with possible necrotizing fasciitis.      1. Sepsis- Admit patient. Infected Sacral Decubitus ulcer/Possible Necrotizing fasciits,UTI,Lactic acid= 6.4, WBC= 14.1. CT pelvis report reviewed. ED physician has already been in contact with General Surgeon on call and patient will be seen in consultation. Continue Vancomycin, Zosyn and Clindamycin. Continue IV fluids. Currently NPO. Repeat labs in a.m. Monitor vitals closely. Will make further recommendations based on clinical course.     2. Possible Necrotizing Fasciitis- ED has contacted General Surgeon on call and patient will be seen in consultation. Continue Vancomycin, Zosyn and Clindamycin. Plan per surgeon.    Areas Assessed:      Pressure Injury Location: Sacrum          2/20 2/22 2/24 2/27 2/27, view into the undermining to bottom half of wound        Wound type: Pressure Injury     Pressure Injury Stage: 4, present on admission   Wound history/plan of care:   Patient admitted with worsening pressure wounds, was taken for an I&D    Wound base: 30 % slough and bone, 70 % dermis, non-granular tissue and adipose tissue, scant granulation starting     Palpation of the wound bed: boggy      Drainage: scant     Description of drainage: serosanguinous     Measurements (length x width x depth, in cm) 10  x 12  x  3.5 cm      Tunneling N/A     Undermining up to 1.5 cm from 8-11  o'clock  Periwound skin: Intact      Color: normal and consistent with surrounding tissue, slight purple discoloration near gluteal cleft      Temperature: normal   Odor: none  Pain: moderate, with palpation and during dressing change  Pain intervention prior to dressing change: slow and gentle cares   Treatment goal: Heal , Increase granulation and Protection  STATUS: evolving  Supplies ordered: gathered    My PI Risk Assessment     Sensory Perception: 2 - Very Limited     Moisture: 1 - Constantly moist     Activity: 1 - Bedfast      Mobility: 2 - Very limited     Nutrition: 2 - Probably inadequate      Friction/Shear: 1 - Problem     TOTAL: 9  __________________________________________________________________________________________________________________  Pressure Injury Location: [facility]         2/20 2/22 2/24 2/27    Wound type: Pressure Injury     Pressure Injury Stage: 4, present on admission   Wound history/plan of care:   See above    Wound base: 30 % slough and bone, 70 % non-granular tissue and adipose tissue, scant granulation      Palpation of the wound bed: normal      Drainage: scant     Description of drainage: serosanguinous     Measurements (length x width x depth, in cm) 12  x 10.5  x  3 cm      Tunneling N/A     Undermining N/A  Periwound skin: Intact      Color: normal and consistent with surrounding tissue      Temperature: normal   Odor: none  Pain: mild,   Pain intervention prior to dressing change: slow and gentle cares   Treatment goal: Heal , Increase granulation and Protection  STATUS: evolving  Supplies ordered: gathered  ________________________________________________________________________________________________________________    Negative pressure wound therapy applied to: Sacrum &  KIMBERLY fox   Last photo: see above  Wound due to: Pressure Injury   Wound history/plan of care:      Surgical date: 2/18/23     Date Negative Pressure Wound Therapy initiated: 2/20/23     Interventions in place: repositioning, pressure redistribution with device or dressing, specialty surface in use, moisture/incontinence management and offloading    Is patient s nutritional status compromised? yes   a. If yes, what interventions are in place? Protein supplements    Reason for initiating vac therapy? Presence of co-morbidities, High risk of infections, Need for accelerated granulation tissue and Prior history of delayed wound healing    Which?of?the?following?co-morbidities?apply? Immobility  a. If diabetic is patient on a diabetic management program? N/A     Is osteomyelitis present in wound? Y  a.  If yes what treatments are in place? N/A    Number of foam pieces removed from a wound (excluding foam for bridge) : 4 total pieces of cleanse choice   Verified this matched the number of foam pieces applied last dressing change: N/A   Number of foam pieces packed into wound (excluding foam for bridge) : 3 pieces  GranuFoam Black. To sacral wound: 20cc Vashe, 5 minute soak, every 4 hours, -125mmHg.   _____________________________________________________________________________________________________________________________________________________________________    Pressure Injury Location: Bilateral heels      2/20 L heel                                                         2/27 2/20 R heel                                                         2/27     Wound type: Pressure Injury     Pressure Injury Stage: Unstageable, present on admission      Wound history/plan of care:   See above    Wound base: 100 % eschar     Palpation of the wound bed: firm      Drainage: none     Description of drainage: none     Measurements (length x width x depth, in cm)   L heel: 4  x 5.5 cm    R heel: 2  x 2 cm      Tunneling  "N/A     Undermining N/A  Periwound skin: Intact and Scar tissue      Color: pink      Temperature: normal   Odor: none  Pain: absent, none  Pain intervention prior to dressing change: slow and gentle cares   Treatment goal: Maintain (prevention of deterioration) and offload pressure, reduce friction and shear  STATUS: initial assessment  Supplies ordered: supplies stored on unit - no change in care needed    All wounds assessed and redressed, no major changes in clinical picture. Daughter reported that patient started drinking glucerna and ankit for wound healing. Daughter took pictures of wounds during vac change.       Treatment Plan:     Negative pressure wound therapy plan:  Wound location: Sacrum + R Trocanter    Change Days: Mon/Wed/Fri by C RN    Supplies (including all accessories) used: medium Black foam , Adapt barrier ring and Cavilon no sting barrier film  Cleanse with Vashe prior to replacing VAC    Suction setting: -125   Methods used: Window paned all periwound skin with vac drape prior to applying sponge and barrier rings around entire sacral wound to protect from stool     Staff RN to assess integrity of dressing and ensure suction is set at appropriate level every shift.   Date canister. Chart canister output every shift. Change cannister weekly and PRN if full/occluded.    Remove foam dressing and replace with BID normal saline moist gauze dressing if:   -a dressing failure which cannot be repaired within 2 hours   -patient is discharging to home without a home pump   -patient is discharging to a facility outside the local area   -if a dressing is a \"Silver Foam\", remove before Radiation Therapy or MRI     The hospital VAC pump is not to be discharged with the patient.?Ensure to disconnect patient from machine prior to discharge. Then,    - If a home KCI VAC pump has been delivered, connect home cannister to dressing tubing then connect cannister to home pump and turn on machine    - If " transferring to a nearby facility with a KCI vac, can disconnect and clamp tubing then cover end with glove so can be reconnected within 2 hours      Heels  1. Paint with betadine daily, allow to dry  2. Offload heels at all times while in bed, utilize prevlon boots    RECOMMEND PRIMARY TEAM ORDER: None, at this time  Education provided: importance of repositioning, wound progress, Infection prevention , Moisture management and Off-loading pressure  Discussed plan of care with: Patient, Family, Nurse and Physician  WOC nurse follow-up plan: Monday/WednesdayFriday  Notify WOC if wound(s) deteriorate.  Nursing to notify the Provider(s) and re-consult the WOC Nurse if new skin concern.    DATA:     Current support surface: Standard  Standard gel/foam mattress (IsoFlex, Atmos air, etc)  Containment of urine/stool: Incontinence Protocol  BMI: Body mass index is 23.62 kg/m .   Active diet order: None     Output: I/O last 3 completed shifts:  In: 1904 [I.V.:1904]  Out: 2240 [Urine:1765; Drains:475]     Labs:   Recent Labs   Lab 03/01/23  0613   ALBUMIN 1.5*   HGB 8.1*   WBC 5.0     Pressure injury risk assessment:   Sensory Perception: 3-->slightly limited  Moisture: 3-->occasionally moist  Activity: 1-->bedfast  Mobility: 2-->very limited  Nutrition: 1-->very poor  Friction and Shear: 1-->problem  Lalit Score: 11    MUNIRA AMBRIZ RN CWOCN  Pager no longer is use, please contact through [facility] group: WO Nurse

## 2023-03-01 NOTE — PLAN OF CARE
Problem: Plan of Care - These are the overarching goals to be used throughout the patient stay.    Goal: Absence of Hospital-Acquired Illness or Injury  Intervention: Prevent Skin Injury  Recent Flowsheet Documentation  Taken 2/28/2023 1645 by Cece Mari RN  Body Position:   turned   left  Taken 2/28/2023 1515 by Cece Mari RN  Body Position:   turned   right  Taken 2/28/2023 0955 by Cece Mari RN  Body Position:   turned   left  Patient repositioned in bed.  Off-loading boots in place.  Low air loss mattress in use.  Wound vac dressing clean, dry and intact.    Problem: Plan of Care - These are the overarching goals to be used throughout the patient stay.    Goal: Optimal Comfort and Wellbeing  Outcome: Progressing  Patient continues to report generalized pain.  PRN Tylenol, Oxycodone and Robaxin given.  Patient reports some relief with these interventions.    Problem: Oral Intake Inadequate  Goal: Improved Oral Intake  Outcome: Not Progressing  Refused all meals.  Refused all oral intake with the exception of applesauce with medications and ice chips.    Cece Mari RN

## 2023-03-01 NOTE — PROGRESS NOTES
PALLIATIVE CARE SOCIAL WORK Progress Note     Joint visit with Palliative NP and Pt, son Jonas and dtr Court. Pt was able to answer a few questions, but did not otherwise want to engage.   Talked with son and dtr about role of Palliative care and about importance of getting HCD/surrogate named. There has been some confusion about which daughter is the primary contact. Per Court, she and Pt talked today and Pt does want all of her kids involved and able to receive medical information, but she is and has been the primary health care support. Provided family with copies of HCD and encouraged them to complete. No other immediate concerns today.      Plan: PCSW continues to follow.     DAPHNEY Fleming, [facility]  Palliative Care Team  Team Pager: [phone]

## 2023-03-01 NOTE — PROGRESS NOTES
[facility]  Palliative Care Daily Progress Note      Recommendations & Counseling      Encounter for palliative care  Psychosocial support    Psychosocial support was provided to patient and/or family.    Prognosis: If does not have more nutritional support, may be at risk of further decline, recurrent infection, poor wound healing. Did discuss options for outpatient/community palliative care (vs hospice). Did briefly review with fortunato Yun the potential decline if nutrition not optimal, and hospice as a resource (very limited discussion re: this today).     Palliative performance scale (PPS): 40    CODE status: FULL (did not discuss this today).     Goals of Care: Restorative at this time. Met with dtr Shantelle and her twin brother Jonas today. Discussed a hope for clarifying health care agents in the near future (provided blank [place] [facility] HCD and Health Care agent pt education, and encouraged review. Discussed that it may be done in the community, yet also would be very helpful now, for things such as decision-making for an EGD and other medical decisions. Did discuss PEG for additional nutrition support, if needed. Pt yesterday said she was unsure if she would want this. Yogeshr Shantelle understands, may be inclined to agree (but not a final decision).   Surrogate: Pt is tired, limited conversation with her today. Did talk with dtr Shantelle re: wishing she had been at the meeting yesterday yet very glad to meet her now. Reviewed that the health care directive pt started to work on yesterday was not completed. Dtr Shantelle, indicates her Mom may have been confused, eg., may have been thinking ok to contact dtrs, yet perhaps a different family member be the health care agents/decision makers (although yesterday pt had indicated she wanted them both to be involved). This is a very tricky situation. At this time, there is not a clear decision maker. Did speak with Risk Mgmt. [facility] does not have inpatient health care directive support (do have outpt); placed SW consult to possibly assist with health care directive and decision makers at some time in the near future, yet very tricky, advise review of palliative care notes prior to working on, if do. *Was unable to return a call to dtr Valerie today.     Disposition:Believe plan is to return to [facility] where she resides, per CM.     Symptoms    Pain, denies today.  ? Healing Touch ordered     Palliative medicine will follow. Plan to meet with dtr Shantelle at 100 on Friday, however, do hope that some additional staff can assist with health care directive/health care agent clarification in the interim.     Total time spent was 55 minutes regarding health care agents, health care directive naming health care agents, on the date of the encounter. These recommendations were given to the primary team via [facility].     LAMONT Duran, FNP-BC, PMHNP-BC  Palliative Care Nurse Practitioner  [facility]  [phone]        During regular M-F work hours -- if you are not sure who specifically to contact -- please contact us by calling [phone].         Assessments:  Cristal Preciado is a 72 year old female with CAD, cognitive impairment but not diagnosed with dementia prior to admission and multiple episodes of acute encephalopathy, visual impairment (glaucoma and diabetic retinopathy), IDDM2 w neuropathy, retinopathy, nephropathy, GERD< HTN, HLD, prior stroke (left her wheelchair bound), major depression, CKD2, semi-recent Covid infection (12/29/22).     Cristal moved into a new SNF ([facility]) in December.  She has had a longstanding sacral decubitus ulcer and presented with infected ulcer and concern for necrotizing fasciitis based on elevated lactic acid, leukocytosis, etc; she underwent debridement on 2/17/23 with Dr Ragsdale.  Over the past week, Cristal has refused to eat much and is not meeting nutritional needs.         Interval  History:      Chart review/discussion with unit or clinical team members:   Hospitalist note from yesterday re: nutrition:  Moderate malnutrition, weight loss, failure to thrive:   Malnutrion status as determined by dietitian, see their notes for details.   Patient was admitted from 2/1-2/3/23 for evaluation of weight loss, failure to thrive and decreased appetite. GI was consulted. Per consult note on 2/3/23, the above issues are related to patient's multiple comorbidities and age rather than an acute GI process. Family demanded an EGD to be done. Outpatient EGD was scheduled on 3/1/23.  After this admission, has been eating little or none. The etiology is multifactorial. Her poor nutrition status affects her wound healing. Has ongoing discussion about tube feed.   - Patient has history of oropharyngeal dysphagia. Per SLP evaluation on 2/16/23, recommends level 4/pureed textures and thin liquids. Per family, patient does not like puree diet and that would contribute to her poor appetite. Patient wanted to upgrade to soft bite size diet with thin liquid on 2/21.  - Per family, patient's appetite could be affected by mood easily. Brother recently passed away, which affects her mood to eat. Her daughter Valerie agrees to have a trial of Remeron (started 2/26).   - Patient defers the decision about tube feed to her children. The initial plan was to place a temporary NJ feeding tube until the family make decision on PEG tube. Discussed with her daughter Carolina, they would like to hold on the NJ tube placement. They feel patient has shown signs of increased oral intake when family bring in food from home. Family has been bringing in patient's favorite food for the past few days. However, patient did not really eat much.  Discussed with nursing staff to document the amount of food patient eats.  Continue calorie counts. Family will have a conference about PEG placement tomorrow.  - Per Valerie, she still wants  the EGD to be done since patient has a history of ulcer. Consulted GI.    Palliative Care note from yesterday re: health care agents:    Goals of Care: Restorative at this time. Pt has said to her dtr that she hoped to get better, get out of care facilities and be at home, with her dtr Shantelle, and then move to [place] to be with extended family (no specific family identified yet). Pt and dtr aware of pts poor nutrition. Current plan is for an EGD in the near future. Did discuss PEG for additional nutrition support, if needed. Pt is unsure if she would want this. Did introduce the option for an informational hospice consult, if does not want more aggressive nutritional support.   ? Surrogate: Today, clarified with pt (is oriented-to the season, location, president, date of birth). Wishes both of her dtrs could collaborate/work together. But aware of some tension. Named her dtr Valerie as a 1st health care agent, then her dtr Shantelle (this was witnessed by BRANDY Monroy) Made 4 attempts to reach dtr Shantelle (3 in advance and one during this meeting, no answer; dtr Valerie verified was the correct number) Did ask her wish and transcribed it for her that she would like her dtrs to work together but if ok with naming as above and if one daughter cannot be reached, the other dtr can make decisions.  Notary came but sounds as though the HCD has not been finalized yet (if heard accurately).   Addendum: Got a call back from dtr Shantelle Tuesday afternoon, after this meeting.Reviewed the above. She is frustrated with this development and said she has been the main HCA. No HCD done before. Did review that pt seemed to have capacity to identify her health care agents and certainly wanted her to be a part of this meeting (3 advance phone calls and one during the actual meeting time). Asked Shantelle if she can join at 1100 meeting tomorrow. She is unable, but can make 130 (this provider has a different family  meeting from 100-200 PM). Concluded that certainly had tried very hard to reach her in advance of this meeting today and wanted her to be present and attend in this discussion. It does need to be discussed further. Reached out to risk management now for further recommendations (cora and LVM).  Spoke with Nursing Supervisor re: the situation, to be apprised/in the loop, and connect with the floor (kiera, and RN, as dtr may be coming in this evening). Sent a message to Hospitalist to apprise of situation too.     Per patient or family/caregivers today:  Today, spoke with Risk Management and Care Mgmt, and conferred with Palliative Care colleagues re: this challenging care, prior to connecting dtr Court re: health care directives, health care agents, (encouraged completion) and encouraged clarification so staff know which family member to talk with re: important medical decisions such as EGD (declined it today, as per pt and dtr Court, they did not consent this). Did explain it would be helpful to know which family member(s) to speak with re: care decisions such as for the EGD, reviewed the issue of feeding tube (PEG). Pt is inclined to not want a feeding tube. Dtr Court does say she does not think her Mom would choose this (but do not believe this is a final decision, but perhaps leaning this way).      Key Palliative Symptoms:  Tired, did not want to talk much. Has a sore bottom. Wants to be left alone.               Review of Systems:     Besides above, a comprehensive ROS was not done.           Medications:     I have reviewed this patient's medication profile and medications during this hospitalization.             Physical Exam:   Vitals were reviewed  Temp: 98.1  F (36.7  C) Temp src: Oral BP: (!) 140/61 Pulse: 63   Resp: 16 SpO2: 98 % O2 Device: None (Room air)    General: Not in acute distress.Pt is sleepy. Says has a sore bottom.            Data Reviewed:         Reviewed recent labs,  comments:   [facility]  Recent Labs   Lab 03/01/23  0851 03/01/23  0613 03/01/23  0129 02/28/23  2137 02/28/23  0818 02/28/23  0615 02/28/23  0210 02/27/23 2040 02/27/23 2020 02/27/23 1955 02/27/23 0622 02/27/23  0509 02/26/23  0833 02/26/23  0537 02/25/23  0856 02/25/23  0555 02/23/23  0820 02/23/23  0722   NA  --  135*  --   --   --   --   --   --   --   --   --   --   --   --   --   --   --   --    POTASSIUM  --  3.9  --   --   --  3.6  --   --   --   --   --  3.9  --  4.0  --  4.3   < > 4.4   CHLORIDE  --  108*  --   --   --   --   --   --   --   --   --   --   --   --   --   --   --   --    CO2  --  23  --   --   --   --   --   --   --   --   --   --   --   --   --   --   --   --    ANIONGAP  --  4*  --   --   --   --   --   --   --   --   --   --   --   --   --   --   --   --    * 154* 136* 109*   < >  --    < >  --    < >  --    < >  --    < >  --    < >  --    < >  --    BUN  --  5.0*  --   --   --   --   --   --   --   --   --   --   --   --   --   --   --   --    CR  --  0.63  --   --   --   --   --   --   --   --   --   --   --   --   --  0.66  --   --    GFRESTIMATED  --  >90  --   --   --   --   --   --   --   --   --   --   --   --   --  >90  --   --    OLAF  --  7.4*  --   --   --   --   --   --   --   --   --   --   --   --   --   --   --   --    MAG  --  1.6*  --   --   --  1.9  --  2.1  --  2.1  --  1.4*  --  1.6*   < > 1.2*   < > 1.9   PROTTOTAL  --  4.7*  --   --   --   --   --   --   --   --   --   --   --   --   --   --   --  4.4*   ALBUMIN  --  1.5*  --   --   --   --   --   --   --   --   --   --   --   --   --   --   --  0.9*   BILITOTAL  --  0.3  --   --   --   --   --   --   --   --   --   --   --   --   --   --   --  0.5   ALKPHOS  --  154*  --   --   --   --   --   --   --   --   --   --   --   --   --   --   --  173*   AST  --  28  --   --   --   --   --   --   --   --   --   --   --   --   --   --   --  31   ALT  --  14  --   --   --   --   --   --   --   --   --   --   --   --   --    --   --  17    < > = values in this interval not displayed.     CBC  Recent Labs   Lab 03/01/23  0613 02/28/23  0615 02/23/23  0523   WBC 5.0 4.8 6.2   RBC 2.89* 2.75* 2.94*   HGB 8.1* 7.8* 8.4*   HCT 26.7* 25.6* 27.4*   MCV 92 93 93   MCH 28.0 28.4 28.6   MCHC 30.3* 30.5* 30.7*   RDW 16.1* 15.9* 15.9*    208 230     INRNo lab results found in last 7 days.  Arterial Blood GasNo lab results found in last 7 days.          TTS: I have personally spent a total of 55 minutes  today on the unit in review of medical record, consultation with the medical providers and assessment of patient, with more than 50% of this time spent in counseling, coordination of care, and discussion with family today re: health care directives, health care agents, goals of care, nutrition, wound healing, as well as some add; time spent with RM, CM, and Palliative Care colleagues, re: health care agents, emotional support and development of plan of care.    LAMONT Duran, FNP-BC, PMHNP-BC  Palliative Care Nurse Practitioner  [facility]  [phone]      During regular M-F work hours -- if you are not sure who specifically to contact -- please contact us by calling [phone].

## 2023-03-01 NOTE — PLAN OF CARE
Problem: Plan of Care - These are the overarching goals to be used throughout the patient stay.    Goal: Optimal Comfort and Wellbeing  Outcome: Progressing  Intervention: Monitor Pain and Promote Comfort  Recent Flowsheet Documentation  Taken 3/1/2023 0538 by Marisa Garcia RN  Pain Management Interventions:   medication (see MAR)   repositioned  Taken 3/1/2023 0142 by Marisa Garcia RN  Pain Management Interventions:   medication (see MAR)   repositioned  Intervention: Provide Person-Centered Care  Recent Flowsheet Documentation  Taken 3/1/2023 0142 by Marisa Garcia RN  Trust Relationship/Rapport: care explained   Goal Outcome Evaluation:  No new events overnight.  Slept between cares.  Agreed to reposition x2.  Oxycodone given for pain x2.

## 2023-03-01 NOTE — PLAN OF CARE
"Problem: Pain Acute  Goal: Optimal Pain Control and Function  Outcome: Progressing  Intervention: Develop Pain Management Plan  When asked pt how she's feeling, she said \"better\" then followed up question with asking patient what number her pain is, she said \"10.\" Continuing to reposition when patient allows and medicated PRN.     Problem: Malnutrition  Goal: Improved Nutritional Intake  Outcome: Progressing   Patient ate two Ritz crackers and a few bites of applesauce with her AM medications. Continuing to encourage PO intake.     MARIETTA WELDON RN  "

## 2023-03-02 NOTE — PROGRESS NOTES
[facility]    Medicine Progress Note - Hospitalist Service    Date of Admission:  2/16/2023    Assessment & Plan     Cristal Preciado is a 72 year old female with h/o bedbound nursing home resident, sacral decubitus ulcer (present on admission), CVA, dementia, neuropathy, DM2, CKD, CAD, failure to thrive admitted on 2/16/2023 with infected new sacral decubitus ulcer with possible necrotizing fasciitis.      Severe sepsis secondary to infected stage IV sacral and right greater trochanter decubitus ulcers, necrotizing fasciitis:  On admission, sepsis criteria met with leukocytosis (WBC 14.1), tachycardia and lactic acidosis (Lactic acid 6.4). Source being infected infected stage IV sacral and right greater trochanter decubitus ulcers.   Received I&D by Dr. Ragsdale on 2/17. Found gross necrosis of the subcutaneous tissues and then fascia extending all the way down to the sacrum bone.  Cultures growing Proteus, E.coli and Enterococcus. Blood culture has no growth.   - ID consulted and input appreciated. Initially treated with Vancomycin, clindamycin and Zosyn. Now on Zosyn per culture sensitivity.  Plan:  - Continue with current medical management,no new changes at this time  - Continue with Zosyn IV.  Per ID, plan for 4-6 weeks of IV Zosyn. PICC line in right arm.  - Wound VAC placed. Continue wound care.     Moderate malnutrition, weight loss, failure to thrive:   Malnutrion status as determined by dietitian, see their notes for details.   Patient was admitted from 2/1-2/3/23 for evaluation of weight loss, failure to thrive and decreased appetite. GI was consulted. Per consult note on 2/3/23, the above issues are related to patient's multiple comorbidities and age rather than an acute GI process. Family demanded an EGD to be done. Outpatient EGD was scheduled on 3/1/23.  After this admission, has been eating little or none. The etiology is multifactorial. Her poor nutrition status affects her wound  healing. Has ongoing discussion about tube feed.   - Patient has history of oropharyngeal dysphagia. Per SLP evaluation on 2/16/23, recommends level 4/pureed textures and thin liquids. Per family, patient does not like puree diet and that would contribute to her poor appetite. Patient wanted to upgrade to soft bite size diet with thin liquid on 2/21.  - Per family, patient's appetite could be affected by mood easily. Brother recently passed away, which affects her mood to eat. Her daughter Valerie agrees to have a trial of Remeron (started 2/26).   - Patient defers the decision about tube feed to her children. The initial plan was to place a temporary NJ feeding tube until the family make decision on PEG tube.  Previous hospitalist discussed with her daughter Court and Valerie, they would like to hold on the NJ tube placement. They feel patient has shown signs of increased oral intake when family bring in food from home. Family has been bringing in patient's favorite food for the past few days. However, patient did not really eat much.  Discussed with nursing staff to document the amount of food patient eats.  Continue calorie counts. Family will have a conference about PEG placement tomorrow.  - Per Valerie, she still wants the EGD to be done since patient has a history of ulcer. Consulted GI.  Plan:  -Patient declined EGD that was scheduled  3/1   -Continue with PPI per GI     Type II DM : HgbA1c on 1/3/2023 was 7.6 %. Patient is refusing to eat and has had numerous hypoglycemic episodes. PTA lantus 20 units QAM and Novolog sliding scale. PTA Victoza and metformin on hold.  - On D5 drip. Lantus reduced to 5 units subcu every morning. Novolog sliding scale.  - Hypoglycemic protocol     HTN: Blood pressure is well controlled. Continue PTA metoprolol.    History of CAD: on metoprolol and lipitor.      Acute blood loss anemia: Hgb was 5.7 after surgery. Transfused 2 units of packed RBCs  - Continue to monitor and  transfuse to keep Hgb > 7. Hemoglobin has been stable.     UTI: Continue with  Zosyn IV. Urine culture grew mixed urogenital brittani     Hyponatremia: Mild. Likely due to hypovolemia and poor oral intake. Resolved.      Hypercalcemia: resolved may have been due to dehydration     Hypokalemia: replacement as needed per protocol. Monitor with BMP     Hypomagnesemia: Replace per protocol     Pressure ulcer on bilateral heels, unstageable: Present on admission. L heel: 4  x 5.5 cm  and R heel: 2  x 2 cm, the skin surface is black.  - Wound care consulted. On offload boots.     History of ulcer, GERD: on PPI.    Hyperlipidemia: on statin.     Goal of care: Per chart review, patient has been a nursing home resident in [facility] since Oct 2011 due to lower extremity weakness from a stroke. She has become bed bound and wheelchair bound for 5 years. Had 4 admissions for the past two months for evaluation of UTI, encephalopathy, malnutrition, and weight loss.   - Palliative care consulted. Plan a family conference   - Heathcare representatives: Patient has been able to make decision herself. However, for the past few days, she has become agitated easily and often refuses to answer questions. She has indicated to let her children to make decision for her. She previosuly told staff that she wants Court to be the primary representative. However, her other daughter told the provider that there are some disagreement among the siblings. She is not aware about the care conference coming up. Valerie states that she has been the primary care giver for her mother for a long time and knows her mother the best. She would like to be the primary contact and patient's primary healthcare representative. Patient's son, Camron, has been banned from the hospital due to threatening behaviors towards staff. Her daughters Court and Valerie have been visiting her. Patient told this writer today that both Court and Valerie can  make decisions for her.   Discussed with palliative care today. Plan to ask patient to sign ACP documents to designate healthcare representatives when she is still able to make decisions.     Diet: Snacks/Supplements Adult: Magic Cup; With Meals    DVT Prophylaxis: Pneumatic Compression Devices  Cornejo Catheter: Not present  Lines: PRESENT      PICC 02/17/23 Triple Lumen Right Basilic Vascular access-Site Assessment: WDL      Cardiac Monitoring: None  Code Status: Full Code      Clinically Significant Risk Factors            # Hypomagnesemia: Lowest Mg = 1.2 mg/dL in last 2 days, will replace as needed   # Hypoalbuminemia: Lowest albumin = 0.9 g/dL at 2/23/2023  7:22 AM, will monitor as appropriate          # DMII: A1C = 7.6 % (Ref range: <5.7 %) within past 6 months    # Moderate Malnutrition: based on nutrition assessment     Disposition Plan      Expected Discharge Date: 03/03/2023    Discharge Delays: Placement - LTC  IV Medication - consider oral or Home Infusion  Procedure Pending (enter procedure & time in comments)  Destination: long-term care facility  Discharge Comments: needs tube feeding, EGD 3/1 in OR  wants new LTC  care conference       Deshaun Urbano MD  Hospitalist Service  [facility]  Securely message with [employer] (more info)  Text page via Smartsheet Paging/Directory   ______________________________________________________________________    Interval History   No new events overnight per RN.  Patient just about the same compared to yesterday.  She remains on IV antibiotics with wound care as and on PPI.      Physical Exam   Vital Signs: Temp: 99.3  F (37.4  C) Temp src: Oral BP: (!) 143/66 Pulse: 70   Resp: 20 SpO2: 97 % O2 Device: None (Room air)    Weight: 150 lbs 12.71 oz    General appearance: Elderly female lying in bed comfortably in no distress.  Not in acute distress  HEENT: PERRL, EOMI  Lungs: Clear breath sounds in bilateral lung fields  Cardiovascular: Regular rate and  rhythm, normal S1-S2  Abdomen: Soft, non tender, no distension, normal bowel sound  Musculoskeletal: No joint swelling  Skin: Wound vac on right sacrum and right hip. Pressure ulcers on left and right heels.  Please see wound care/nursing note for complete skin exam.  Psychiatry, awake alert oriented to person.    Medical Decision Making       40 minutes spend by me on the date of service doing chart review, history, exam, documentation & further activities per the note.       Data    Lab Results   Component Value Date    WBC 4.8 02/28/2023    WBC 7.8 08/06/2019     Lab Results   Component Value Date    RBC 2.75 02/28/2023    RBC 2.97 08/06/2019     Lab Results   Component Value Date    HGB 7.8 02/28/2023    HGB 9.3 08/06/2019     Lab Results   Component Value Date    HCT 25.6 02/28/2023    HCT 29.9 08/06/2019     No components found for: MCT  Lab Results   Component Value Date    MCV 93 02/28/2023     08/06/2019     Lab Results   Component Value Date    MCH 28.4 02/28/2023    MCH 31.3 08/06/2019     Lab Results   Component Value Date    MCHC 30.5 02/28/2023    MCHC 31.1 08/06/2019     Lab Results   Component Value Date    RDW 15.9 02/28/2023    RDW 12.5 08/06/2019     Lab Results   Component Value Date     02/28/2023     08/06/2019     Last Comprehensive Metabolic Panel:  Sodium   Date Value Ref Range Status   03/02/2023 137 136 - 145 mmol/L Final   08/06/2019 130 (L) 133 - 144 mmol/L Final     Potassium   Date Value Ref Range Status   03/02/2023 3.6 3.4 - 5.3 mmol/L Final   10/28/2021 3.7 3.5 - 5.0 mmol/L Final   08/06/2019 4.8 3.4 - 5.3 mmol/L Final     Chloride   Date Value Ref Range Status   03/02/2023 109 (H) 98 - 107 mmol/L Final   10/28/2021 103 98 - 107 mmol/L Final   08/06/2019 104 94 - 109 mmol/L Final     Carbon Dioxide   Date Value Ref Range Status   08/06/2019 19 (L) 20 - 32 mmol/L Final     Carbon Dioxide (CO2)   Date Value Ref Range Status   03/02/2023 22 22 - 29 mmol/L Final    10/28/2021 22 22 - 31 mmol/L Final     Anion Gap   Date Value Ref Range Status   03/02/2023 6 (L) 7 - 15 mmol/L Final   10/28/2021 13 5 - 18 mmol/L Final   08/06/2019 8 3 - 14 mmol/L Final     Glucose   Date Value Ref Range Status   10/28/2021 129 (H) 70 - 125 mg/dL Final   08/06/2019 184 (H) 70 - 99 mg/dL Final     GLUCOSE BY METER POCT   Date Value Ref Range Status   03/02/2023 114 (H) 70 - 99 mg/dL Final     Urea Nitrogen   Date Value Ref Range Status   03/02/2023 3.1 (L) 8.0 - 23.0 mg/dL Final   10/28/2021 33 (H) 8 - 22 mg/dL Final   08/06/2019 24 7 - 30 mg/dL Final     Creatinine   Date Value Ref Range Status   03/02/2023 0.65 0.51 - 0.95 mg/dL Final   08/06/2019 0.98 0.52 - 1.04 mg/dL Final     GFR Estimate   Date Value Ref Range Status   03/02/2023 >90 >60 mL/min/1.73m2 Final     Comment:     eGFR calculated using 2021 CKD-EPI equation.   06/17/2021 47 (L) >60 mL/min/1.73m2 Final   08/06/2019 59 (L) >60 mL/min/[1.73_m2] Final     Comment:     Non  GFR Calc  Starting 12/18/2018, serum creatinine based estimated GFR (eGFR) will be   calculated using the Chronic Kidney Disease Epidemiology Collaboration   (CKD-EPI) equation.       Calcium   Date Value Ref Range Status   03/02/2023 7.6 (L) 8.8 - 10.2 mg/dL Final   08/06/2019 8.8 8.5 - 10.1 mg/dL Final     Bilirubin Total   Date Value Ref Range Status   03/02/2023 0.3 <=1.2 mg/dL Final   03/06/2014 0.2 0.2 - 1.3 mg/dL Final     Alkaline Phosphatase   Date Value Ref Range Status   03/02/2023 147 (H) 35 - 104 U/L Final   03/06/2014 127 40 - 150 U/L Final     ALT   Date Value Ref Range Status   03/02/2023 12 10 - 35 U/L Final   10/12/2015 39 0 - 50 U/L Final     AST   Date Value Ref Range Status   03/02/2023 26 10 - 35 U/L Final   03/06/2014 35 0 - 45 U/L Final     Imaging results reviewed over the past 24 hrs:   No results found for this or any previous visit (from the past 24 hour(s)).

## 2023-03-02 NOTE — PLAN OF CARE
"  Problem: Plan of Care - These are the overarching goals to be used throughout the patient stay.    Goal: Plan of Care Review  Description: The Plan of Care Review/Shift note should be completed every shift.  The Outcome Evaluation is a brief statement about your assessment that the patient is improving, declining, or no change.  This information will be displayed automatically on your shift note.  Outcome: Progressing  Goal: Patient-Specific Goal (Individualized)  Description: You can add care plan individualizations to a care plan. Examples of Individualization might be:  \"Parent requests to be called daily at 9am for status\", \"I have a hard time hearing out of my right ear\", or \"Do not touch me to wake me up as it startles me\".  Outcome: Progressing  Goal: Absence of Hospital-Acquired Illness or Injury  Outcome: Progressing  Intervention: Identify and Manage Fall Risk  Recent Flowsheet Documentation  Taken 3/1/2023 1552 by Opal Perez RN  Safety Promotion/Fall Prevention: bed alarm on  Intervention: Prevent Skin Injury  Recent Flowsheet Documentation  Taken 3/1/2023 1958 by Opal Perez RN  Body Position: refuses positioning  Taken 3/1/2023 1704 by Opal Perez RN  Body Position:    turned    left  Taken 3/1/2023 1552 by Opal Perez RN  Body Position:    right    turned  Intervention: Prevent and Manage VTE (Venous Thromboembolism) Risk  Recent Flowsheet Documentation  Taken 3/1/2023 1552 by Opal Perez RN  VTE Prevention/Management: SCDs (sequential compression devices) off  Goal: Optimal Comfort and Wellbeing  Outcome: Progressing  Intervention: Provide Person-Centered Care  Recent Flowsheet Documentation  Taken 3/1/2023 1552 by Opal Perez RN  Trust Relationship/Rapport: care explained  Goal: Readiness for Transition of Care  Outcome: Progressing   Goal Outcome Evaluation:Patient alert, disoriented to time and situation. Denies pain. Sleeping between care. Turned and " repositioned once, but refused repositioning afterwards. 1 loose bm this evening. Ate few bites of dinner. Daughter at bedside. Refused all eye drops and diclofenac. Blood sugar 145mg/dl. Wound VAC in place and  intact. On room air. PICC triple lumen right basilic  In place.  D5  and Zosyn infusing. Purewick in place.

## 2023-03-02 NOTE — PROGRESS NOTES
PALLIATIVE CARE SOCIAL WORK update:    Palliative care has been following for goals of care and support.   Pt has voiced that she wants her dtr Court to be her decision maker and then recently also voiced that she wants her dtr Valerie to be her decision maker, and also said that she wants both of them to work together. Per family report, there is tension and disagreement on things, and Court and Valerie do not typically work together.   On 3/1/23, Palliative team met briefly with Pt, dtr Court and son Jonas. Discussed HCD and that Pt is able to voice her wishes for surrogate decision making. Provided them with blank copies of Health Care Directives and encouraged Court to work with Pt and her sister Valerie on determining who Pt wants to list as surrogates and to complete the HCD with her.   Clarified that Palliative team can support as needed, but cannot get into middle of family dynamics regarding surrogate choices.     On 3/2/23 dtr Valerie called and left msg for Palliative team asking or update on Honoring Choices paperwork. Writer called back and left voicemail for Valerie, updating her on visit with Pt and her sister yesterday, that blank copies of HCD were provided, and encouraged her to work with Court and Pt on completing.     DAPHNEY Fleming, [facility]  Palliative Care Team  Team Pager: [phone]

## 2023-03-02 NOTE — PLAN OF CARE
Problem: Plan of Care - These are the overarching goals to be used throughout the patient stay.    Goal: Plan of Care Review  Description: The Plan of Care Review/Shift note should be completed every shift.  The Outcome Evaluation is a brief statement about your assessment that the patient is improving, declining, or no change.  This information will be displayed automatically on your shift note.  Outcome: Progressing     Problem: Plan of Care - These are the overarching goals to be used throughout the patient stay.    Goal: Absence of Hospital-Acquired Illness or Injury  Intervention: Prevent Skin Injury  Recent Flowsheet Documentation  Taken 3/2/2023 0144 by Debby Escamilla, RN  Body Position:   left   turned     Problem: Risk for Delirium  Goal: Optimal Coping  Outcome: Progressing     Problem: Pain Acute  Goal: Optimal Pain Control and Function  Outcome: Progressing  Intervention: Prevent or Manage Pain  Recent Flowsheet Documentation  Taken 3/2/2023 0144 by Debby Escamilla, RN  Sensory Stimulation Regulation: television on  Medication Review/Management: medications reviewed     Problem: Diabetes Comorbidity  Goal: Blood Glucose Level Within Targeted Range  Outcome: Progressing   Goal Outcome Evaluation:  Pt is alert and calm, with confusion. Denies pain. VS WNL. Repositioning done ans encouraged. Blood glucose at 0200AM is 125. Continuous IVF changed to a new bag at 0200AM.

## 2023-03-02 NOTE — PLAN OF CARE
Problem: Pain Acute  Goal: Optimal Pain Control and Function  Outcome: Progressing  Intervention: Develop Pain Management Plan  Recent Flowsheet Documentation  Taken 3/2/2023 0945 by Kellie Olsen RN  Pain Management Interventions:   medication (see MAR)   emotional support  Intervention: Prevent or Manage Pain  Recent Flowsheet Documentation  Taken 3/2/2023 0945 by Kellie Olsen, RN  Sensory Stimulation Regulation: television on  Medication Review/Management: medications reviewed     Problem: Electrolyte Imbalance  Goal: Electrolyte Imbalance: Plan of Care  Outcome: Progressing   Goal Outcome Evaluation:       Pt received both magnesium and potassium replacement, and recheck now tomorrow.  Received Oxycodone last at 1353

## 2023-03-03 NOTE — ACP (ADVANCE CARE PLANNING)
Palliative Care connected with both dtrs today at separate times, to try to explore goals of care and health care decision making on behalf of pt who has had reduced PO intake and seems to be getting weaker. She had seemed to have enough capacity this week to name her health care agents (wanted both daughters, yet was aware of some between them). No final HCD done. Has a POLST (Full code). Is sleepier today. Started Mirtazapine earlier this week, with the hope of improved appetite.     At issue is:  1.the EGD (which had been planned as outpt, then going to be done inpt, then cancelled).  2.short term feeding tube (per colleague, pt had been very adamet, did not want this),dtr Valerie wants her to get one however (worries pts low caloric intake impacting mentation, and she feels she did talk with pt about her goals and she wanted to get healthier, leave the hospital and eventually move back to [place]).   3.long term feeding tube,   4. big picture plan of care (if does not purse more aggressive nutrition, consider an informational hospice consult).    Consulted Ethics ~145 PM for advice on how best to navigate this challenging health care decision making situation. Spoke with Ethics [phone].  Called both dtrs late this afternoon (400 PM) to offer a prompt facilitated goals of care meeting with both dtrs and Palliative Care and Ethics to sort through some of the above, before the week-end. Dtr Shantelle indicated this was not a good time. Dtr Valerie was available. To both, advised they stay in good contact with the hospital/medical staff this week-end and that important decisions may need to be made soon. To both dtrs, emphasized pt wanted them both involved; they are aware they are each valued surrogate decision makers. Emphasized they may in fact have similar goals for pt, and may be able to have some alignment, with goals, just not be involved/present at the same time.     Ethics advice going forward for  the week-end/Hospitalists (as Palliative Care is not involved over the week-end):  1. Involve both dtrs in important medical decisions (call dtrs separately). Discuss medical decision at hand with each. Ideally, there is agreement on the decision.   2. If there is not agreement, please call Ethics consult pager [phone] to get assistance in resolving the issue.     Eddy Moy NP,CNP, Palliative Care    A total of 90 minutes today was spent in non face to face time, talking with RN x 2, messages to MD, dtr Valerie matias 2 (105-135, and again this afternoon x 10 minutes, total 40 minutes), dtr Shantelle (x 5 minutes), as well as Ethicist (x 40 minutes, [phone] and [phone]), as well as consultation with Palliative Care LICSW who had worked with pt last week.

## 2023-03-03 NOTE — PROGRESS NOTES
[facility]  Palliative Care Daily Progress Note       Recommendations & Counseling     Encounter for palliative care  Psychosocial support    Psychosocial support was provided to patient and/or family.    Prognosis: May be eligible for hospice, if nutrition status declines further.     Palliative performance scale (PPS): ~30    CODE status: FULL  o     Goals of Care: Still trying to clarify. Pts nutrition declining. Has made mention of not wanting a feeding tube.     Today, Went to pts room at 100 to meet dtr Shantelle but not present, pt sleepy, not easy to rouse (in NAD). Got a return call from dtr Valerie at 105 PM as had hoped to have both dtrs involved in a GOC discussion today (1 in person, 1 by phone). Reviewed the very challenging situation with the pt wanting both dtrs to be involved in decisions yet challenging collaborating. Reviewed with dtr Valerie that the HCD was not notarized, trying too encourage family to work this out if able. Asked dtr Valerie if there was another family member who could be a buffer but she did not think so.     Valerie was unaware that pt did not have the EGD. She is frustrated with this as this was a procedure that had been planned in the outpatient setting, by Shantelle (per Valerie), and now is cancelled.    Dtr Valerie wishes pt could get a temporary feeding tube as she worries that pt is weak and her mentation may be even further altered with poor nutrition.     Did review pts big picture situation and that she had seemed to suggest she may not want a feeding tube (did not get a chance to really talk to about temporary vs PEG). Did review with dtr that a temporary feeding tube is not likely going to be allowed at the care facility (PEG tubes are). Have mentioned an informational hospice consult to both dtrs this week.     Surrogate: Pt had verbally expressed a wish for both of her dtrs Milo and Shantelle to be involved in medical decisions.     Disposition:  Back to her care facility.     Plan:    -Placed an Ethics Consult for assistance in navigating this challenging family dynamic/surrogate decision maker situation. Have previously discussed with Risk Management. It is possible there may need to be some sort of formal conversation with both, and family.      Symptoms, palliative care is not actively invovled in symptom management.     Palliative medicine will follow up next week.     Total time spent was 60 minutes in prolonged non face to face discussion regarding goals of care discussions, trying to reach both daughters, promote collaboration, underscore the importance of working together, honoring pts wishes, and that perhaps both dtsr may have the same goals for pt, yet may not be present at the same time, on the date of the encounter. These recommendations were given to the primary team via [facility].    LAMONT Duran, FNP-BC, PMHNP-BC  Palliative Care Nurse Practitioner  [facility]  [phone]      During regular M-F work hours -- if you are not sure who specifically to contact -- please contact us by calling [phone].       Assessments:  Cristal Preciado is a 72 year old female with CAD, cognitive impairment but not diagnosed with dementia prior to admission and multiple episodes of acute encephalopathy, visual impairment (glaucoma and diabetic retinopathy), IDDM2 w neuropathy, retinopathy, nephropathy, GERD< HTN, HLD, prior stroke (left her wheelchair bound), major depression, CKD2, semi-recent Covid infection (12/29/22).     Cristal moved into a new SNF ([facility]) in December.  She has had a longstanding sacral decubitus ulcer and presented with infected ulcer and concern for necrotizing fasciitis based on elevated lactic acid, leukocytosis, etc; she underwent debridement on 2/17/23 with Dr Ragsdale.  Over the past week, Cristal has refused to eat much and is not meeting nutritional needs.         Interval History:      Chart review/discussion  with unit or clinical team members:   Hospitalist note re: nutrition:  Moderate malnutrition, weight loss, failure to thrive:   Malnutrion status as determined by dietitian, see their notes for details.   Patient was admitted from 2/1-2/3/23 for evaluation of weight loss, failure to thrive and decreased appetite. GI was consulted. Per consult note on 2/3/23, the above issues are related to patient's multiple comorbidities and age rather than an acute GI process. Family demanded an EGD to be done. Outpatient EGD was scheduled on 3/1/23.  After this admission, has been eating little or none. The etiology is multifactorial. Her poor nutrition status affects her wound healing. Has ongoing discussion about tube feed.   - Patient has history of oropharyngeal dysphagia. Per SLP evaluation on 2/16/23, recommends level 4/pureed textures and thin liquids. Per family, patient does not like puree diet and that would contribute to her poor appetite. Patient wanted to upgrade to soft bite size diet with thin liquid on 2/21.  - Per family, patient's appetite could be affected by mood easily. Brother recently passed away, which affects her mood to eat. Her daughter Valerie agrees to have a trial of Remeron (started 2/26).   - Patient defers the decision about tube feed to her children. The initial plan was to place a temporary NJ feeding tube until the family make decision on PEG tube. Discussed with her daughter Carolina, they would like to hold on the NJ tube placement. They feel patient has shown signs of increased oral intake when family bring in food from home. Family has been bringing in patient's favorite food for the past few days. However, patient did not really eat much.  Discussed with nursing staff to document the amount of food patient eats.  Continue calorie counts. Family will have a conference about PEG placement tomorrow.  - Per Valerie, she still wants the EGD to be done since patient has a history of  ulcer. Consulted GI.     Palliative Care note from yesterday re: health care agents:    Goals of Care: Restorative at this time. Pt has said to her dtr that she hoped to get better, get out of care facilities and be at home, with her dtr Shantelle, and then move to [place] to be with extended family (no specific family identified yet). Pt and dtr aware of pts poor nutrition. Current plan is for an EGD in the near future. Did discuss PEG for additional nutrition support, if needed. Pt is unsure if she would want this. Did introduce the option for an informational hospice consult, if does not want more aggressive nutritional support.   ? Surrogate: Today, clarified with pt (is oriented-to the season, location, president, date of birth). Wishes both of her dtrs could collaborate/work together. But aware of some tension. Named her dtr Valerie as a 1st health care agent, then her dtr Shantelle (this was witnessed by BRANDY Monroy) Made 4 attempts to reach dtr Shantelle (3 in advance and one during this meeting, no answer; dtr Valerie verified was the correct number) Did ask her wish and transcribed it for her that she would like her dtrs to work together but if ok with naming as above and if one daughter cannot be reached, the other dtr can make decisions.  Notary came but sounds as though the HCD has not been finalized yet (if heard accurately).   Addendum: Got a call back from dtr Shantelle Tuesday afternoon, after this meeting.Reviewed the above. She is frustrated with this development and said she has been the main HCA. No HCD done before. Did review that pt seemed to have capacity to identify her health care agents and certainly wanted her to be a part of this meeting (3 advance phone calls and one during the actual meeting time). Asked Shantelle if she can join at 1100 meeting tomorrow. She is unable, but can make 130 (this provider has a different family meeting from 100-200 PM). Concluded that certainly had  tried very hard to reach her in advance of this meeting today and wanted her to be present and attend in this discussion. It does need to be discussed further. Reached out to risk management now for further recommendations (cora and LVM).  Spoke with Nursing Supervisor re: the situation, to be apprised/in the loop, and connect with the floor (kiera, and RN, as dtr may be coming in this evening). Sent a message to Hospitalist to apprise of situation too.     Per patient or family/caregivers today:  Wednesday, spoke with Risk Management and Care Mgmt, and conferred with Palliative Care colleagues re: this challenging care, prior to connecting sheebar Court re: health care directives, health care agents, (encouraged completion) and encouraged clarification so staff know which family member to talk with re: important medical decisions such as EGD (declined it today, as per pt and sheebar Court, they did not consent this). Did explain it would be helpful to know which family member(s) to speak with re: care decisions such as for the EGD, reviewed the issue of feeding tube (PEG). Pt is inclined to not want a feeding tube. Dtr Court does say she does not think her Mom would choose this (but do not believe this is a final decision, but perhaps leaning this way).      Key Palliative Symptoms:  Pt sleeping soundly today.           Review of Systems:     A ROS was not done today.          Medications:     I have reviewed this patient's medication profile and medications during this hospitalization.           Physical Exam:   Vitals were reviewed  Temp: 97.9  F (36.6  C) Temp src: Axillary BP: 111/40 Pulse: 59   Resp: 16 SpO2: 98 % O2 Device: None (Room air)    General: Not in acute distress.           Data Reviewed:     Reviewed recent labs, comments:   ROUTINE ICU LABS (Last four results)  CMPRecent Labs   Lab 03/03/23  0841 03/03/23  0645 03/03/23  0202 03/02/23  2056 03/02/23  1647 03/02/23  1430 03/02/23  0832  03/02/23  0517 03/01/23  0803 03/01/23  0613 02/28/23  0818 02/28/23  0615 02/25/23  0856 02/25/23  0555   NA  --  135*  --   --   --   --   --  137  --  135*  --   --   --   --    POTASSIUM  --  4.3  --   --   --   --   --  3.6  --  3.9  --  3.6   < > 4.3   CHLORIDE  --  109*  --   --   --   --   --  109*  --  108*  --   --   --   --    CO2  --  21*  --   --   --   --   --  22  --  23  --   --   --   --    ANIONGAP  --  5*  --   --   --   --   --  6*  --  4*  --   --   --   --    * 126* 112* 112*   < >  --    < > 134*   < > 154*   < >  --    < >  --    BUN  --  4.0*  --   --   --   --   --  3.1*  --  5.0*  --   --   --   --    CR  --  0.70  --   --   --   --   --  0.65  --  0.63  --   --   --  0.66   GFRESTIMATED  --  >90  --   --   --   --   --  >90  --  >90  --   --   --  >90   OLAF  --  7.5*  --   --   --   --   --  7.6*  --  7.4*  --   --   --   --    MAG  --  1.9  --   --   --  2.3  --  1.2*  --  1.6*  --  1.9   < > 1.2*   PROTTOTAL  --  4.7*  --   --   --   --   --  4.5*  --  4.7*  --   --   --   --    ALBUMIN  --  1.4*  --   --   --   --   --  1.7*  --  1.5*  --   --   --   --    BILITOTAL  --  0.3  --   --   --   --   --  0.3  --  0.3  --   --   --   --    ALKPHOS  --  160*  --   --   --   --   --  147*  --  154*  --   --   --   --    AST  --  40*  --   --   --   --   --  26  --  28  --   --   --   --    ALT  --  14  --   --   --   --   --  12  --  14  --   --   --   --     < > = values in this interval not displayed.     CBC  Recent Labs   Lab 03/03/23  0645 03/02/23  0517 03/01/23  0613 02/28/23  0615   WBC 5.0 5.1 5.0 4.8   RBC 2.99* 2.83* 2.89* 2.75*   HGB 8.4* 8.0* 8.1* 7.8*   HCT 28.0* 26.2* 26.7* 25.6*   MCV 94 93 92 93   MCH 28.1 28.3 28.0 28.4   MCHC 30.0* 30.5* 30.3* 30.5*   RDW 16.5* 16.0* 16.1* 15.9*    242 231 208     INRNo lab results found in last 7 days.  Arterial Blood GasNo lab results found in last 7 days.       LAMONT Duran, FNP-BC, PMHNP-BC  Palliative Care Nurse  [facility]  [phone]

## 2023-03-03 NOTE — PROGRESS NOTES
[facility] Nurse Inpatient Assessment     Consulted for: Buttocks, heels    Patient History (according to provider note(s):      Cristal Preciado is a 72 year old female with PMH significant for known sacral decubitus ulcer (present on admission), CVA, dementia, neuropathy, DM2, CKD, CAD and FTT admitted on 2/16/2023 with infected new sacral decubitus ulcer with possible necrotizing fasciitis.      1. Sepsis- Admit patient. Infected Sacral Decubitus ulcer/Possible Necrotizing fasciits,UTI,Lactic acid= 6.4, WBC= 14.1. CT pelvis report reviewed. ED physician has already been in contact with General Surgeon on call and patient will be seen in consultation. Continue Vancomycin, Zosyn and Clindamycin. Continue IV fluids. Currently NPO. Repeat labs in a.m. Monitor vitals closely. Will make further recommendations based on clinical course.     2. Possible Necrotizing Fasciitis- ED has contacted General Surgeon on call and patient will be seen in consultation. Continue Vancomycin, Zosyn and Clindamycin. Plan per surgeon.    Areas Assessed:      Pressure Injury Location: Sacrum          2/20 2/22 2/24 2/27 2/27, view into the undermining to bottom half of wound        Wound type: Pressure Injury     Pressure Injury Stage: 4, present on admission   Wound history/plan of care:   Patient admitted with worsening pressure wounds, was taken for an I&D    Wound base: 30 % slough and bone, 70 % dermis, non-granular tissue and adipose tissue, scant granulation starting. Tissue looks more pale today.     Palpation of the wound bed: boggy      Drainage: scant     Description of drainage: serosanguinous     Measurements (length x width x depth, in cm) 10  x 12  x  3.5 cm      Tunneling N/A     Undermining up  to 1.5 cm from 8-11 o'clock  Periwound skin: Intact      Color: normal and consistent with surrounding tissue, slight purple discoloration near gluteal cleft      Temperature: normal   Odor: none  Pain: moderate, with palpation and during dressing change  Pain intervention prior to dressing change: slow and gentle cares   Treatment goal: Heal , Increase granulation and Protection  STATUS: evolving  Supplies ordered: gathered    My PI Risk Assessment     Sensory Perception: 2 - Very Limited     Moisture: 1 - Constantly moist     Activity: 1 - Bedfast      Mobility: 2 - Very limited     Nutrition: 2 - Probably inadequate      Friction/Shear: 1 - Problem     TOTAL: 9  __________________________________________________________________________________________________________________  Pressure Injury Location: [facility]         2/20 2/22 2/24 2/27    Wound type: Pressure Injury     Pressure Injury Stage: 4, present on admission   Wound history/plan of care:   See above    Wound base: 30 % slough and bone, 70 % non-granular tissue and adipose tissue, scant granulation      Palpation of the wound bed: normal      Drainage: scant     Description of drainage: serosanguinous     Measurements (length x width x depth, in cm) 12  x 10.5  x  3 cm      Tunneling N/A     Undermining N/A  Periwound skin: Intact      Color: normal and consistent with surrounding tissue      Temperature: normal   Odor: none  Pain: mild,   Pain intervention prior to dressing change: slow and gentle cares   Treatment goal: Heal , Increase granulation and Protection  STATUS: evolving  Supplies ordered: gathered  ________________________________________________________________________________________________________________    Negative pressure wound therapy  applied to: Sacrum & R trocanter   Last photo: see above  Wound due to: Pressure Injury   Wound history/plan of care:      Surgical date: 2/18/23     Date Negative Pressure Wound Therapy initiated: 2/20/23     Interventions in place: repositioning, pressure redistribution with device or dressing, specialty surface in use, moisture/incontinence management and offloading    Is patient s nutritional status compromised? yes   a. If yes, what interventions are in place? Protein supplements    Reason for initiating vac therapy? Presence of co-morbidities, High risk of infections, Need for accelerated granulation tissue and Prior history of delayed wound healing    Which?of?the?following?co-morbidities?apply? Immobility  a. If diabetic is patient on a diabetic management program? N/A     Is osteomyelitis present in wound? Y  a.  If yes what treatments are in place? N/A    Number of foam pieces removed from a wound (excluding foam for bridge) : 4 total pieces of cleanse choice   Verified this matched the number of foam pieces applied last dressing change: N/A   Number of foam pieces packed into wound (excluding foam for bridge) : 3 pieces  GranuFoam Black. To sacral wound: 20cc Vashe, 5 minute soak, every 4 hours, -125mmHg.   _____________________________________________________________________________________________________________________________________________________________________    Pressure Injury Location: Bilateral heels      2/20 L heel                                                         2/27 2/20 R heel                                                         2/27     Wound type: Pressure Injury     Pressure Injury Stage: Unstageable, present on admission      Wound history/plan of care:   See above    Wound base: 100 % eschar     Palpation of the wound bed: firm      Drainage: none     Description of drainage: none     Measurements (length x width x depth, in cm)   L heel: 4  x 5.5 cm    R heel: 2   "x 2 cm      Tunneling N/A     Undermining N/A  Periwound skin: Intact and Scar tissue      Color: pink      Temperature: normal   Odor: none  Pain: absent, none  Pain intervention prior to dressing change: slow and gentle cares   Treatment goal: Maintain (prevention of deterioration) and offload pressure, reduce friction and shear  STATUS: initial assessment  Supplies ordered: supplies stored on unit - no change in care needed    All wounds assessed and redressed, no major changes in clinical picture. Daughter reported that patient started drinking glucerna and ankit for wound healing. Daughter took pictures of wounds during vac change.       Treatment Plan:     Negative pressure wound therapy plan:  Wound location: Sacrum + R Trocanter    Change Days: Mon/Wed/Fri by C RN    Supplies (including all accessories) used: medium Black foam , Adapt barrier ring and Cavilon no sting barrier film  Cleanse with Vashe prior to replacing VAC    Suction setting: -125   Methods used: Window paned all periwound skin with vac drape prior to applying sponge and barrier rings around entire sacral wound to protect from stool     Staff RN to assess integrity of dressing and ensure suction is set at appropriate level every shift.   Date canister. Chart canister output every shift. Change cannister weekly and PRN if full/occluded.    Remove foam dressing and replace with BID normal saline moist gauze dressing if:   -a dressing failure which cannot be repaired within 2 hours   -patient is discharging to home without a home pump   -patient is discharging to a facility outside the local area   -if a dressing is a \"Silver Foam\", remove before Radiation Therapy or MRI     The hospital VAC pump is not to be discharged with the patient.?Ensure to disconnect patient from machine prior to discharge. Then,    - If a home KCI VAC pump has been delivered, connect home cannister to dressing tubing then connect cannister to home pump and turn on " machine    - If transferring to a nearby facility with a KCI vac, can disconnect and clamp tubing then cover end with glove so can be reconnected within 2 hours      Heels  1. Paint with betadine daily, allow to dry  2. Offload heels at all times while in bed, utilize prevlon boots    RECOMMEND PRIMARY TEAM ORDER: None, at this time  Education provided: importance of repositioning, wound progress, Infection prevention , Moisture management and Off-loading pressure  Discussed plan of care with: Patient, Family, Nurse and Physician  WO nurse follow-up plan: Monday/WednesdayFriday  Notify WOC if wound(s) deteriorate.  Nursing to notify the Provider(s) and re-consult the WO Nurse if new skin concern.    DATA:     Current support surface: Standard  Standard gel/foam mattress (IsoFlex, Atmos air, etc)  Containment of urine/stool: Incontinence Protocol  BMI: Body mass index is 23.62 kg/m .   Active diet order: None     Output: I/O last 3 completed shifts:  In: 1706.67 [P.O.:280; I.V.:1426.67]  Out: 200 [Urine:200]     Labs:   Recent Labs   Lab 03/03/23  0645   ALBUMIN 1.4*   HGB 8.4*   WBC 5.0     Pressure injury risk assessment:   Sensory Perception: 3-->slightly limited  Moisture: 3-->occasionally moist  Activity: 1-->bedfast  Mobility: 2-->very limited  Nutrition: 1-->very poor  Friction and Shear: 1-->problem  Lalit Score: 11    MUNIRA AMBRIZ RN CWOCN  Pager no longer is use, please contact through [facility] group: [facility] Nurse

## 2023-03-03 NOTE — PLAN OF CARE
Problem: Pain Acute  Goal: Optimal Pain Control and Function  Outcome: Progressing  Intervention: Prevent or Manage Pain  Recent Flowsheet Documentation  Taken 3/3/2023 0905 by Kellie Olsen RN  Medication Review/Management: medications reviewed     Problem: Electrolyte Imbalance  Goal: Electrolyte Imbalance: Plan of Care  Outcome: Progressing     Problem: Diabetes Comorbidity  Goal: Blood Glucose Level Within Targeted Range  Outcome: Progressing     Problem: Wound Healing Progression  Goal: Optimal Wound Healing  Outcome: Progressing  Intervention: Promote Wound Healing  Recent Flowsheet Documentation  Taken 3/3/2023 0905 by Kellie Olsen, RN  Activity Management: bedrest  Taken 3/3/2023 0818 by Kellie Olsen, RN  Activity Management: bedrest   Goal Outcome Evaluation:             Pt seems more comfortable today with repositioning.  WOC changed dressing to coccyx area.  Continues to refuse to eat, but did take all morning medications.

## 2023-03-03 NOTE — PLAN OF CARE
Problem: Plan of Care - These are the overarching goals to be used throughout the patient stay.    Goal: Plan of Care Review  Description: The Plan of Care Review/Shift note should be completed every shift.  The Outcome Evaluation is a brief statement about your assessment that the patient is improving, declining, or no change.  This information will be displayed automatically on your shift note.  Outcome: Progressing  Goal: Absence of Hospital-Acquired Illness or Injury  Intervention: Identify and Manage Fall Risk  Recent Flowsheet Documentation  Taken 3/3/2023 0105 by Debby Escamilla RN  Safety Promotion/Fall Prevention: bed alarm on  Intervention: Prevent Skin Injury  Recent Flowsheet Documentation  Taken 3/3/2023 0105 by Debby Escamilla RN  Body Position:   left   turned  Intervention: Prevent and Manage VTE (Venous Thromboembolism) Risk  Recent Flowsheet Documentation  Taken 3/3/2023 0105 by Debby Escamilla RN  VTE Prevention/Management: SCDs (sequential compression devices) off  Goal: Optimal Comfort and Wellbeing  Outcome: Progressing  Intervention: Provide Person-Centered Care  Recent Flowsheet Documentation  Taken 3/3/2023 0105 by Debby Escamilla RN  Trust Relationship/Rapport: care explained     Problem: Diabetes Comorbidity  Goal: Blood Glucose Level Within Targeted Range  Outcome: Progressing     Problem: Wound Healing Progression  Goal: Optimal Wound Healing  Intervention: Promote Wound Healing  Recent Flowsheet Documentation  Taken 3/3/2023 0105 by Debby Escamilla RN  Activity Management: bedrest   Goal Outcome Evaluation:  Pt is alert and oriented. Denies pain. Glucose at 02AM is 112. BP at midnight is 110/39, rechecked after 1 hour and was 152/61. Repositioning refused.

## 2023-03-03 NOTE — PLAN OF CARE
Problem: Malnutrition  Goal: Improved Nutritional Intake  Outcome: Not Progressing     Problem: Wound Healing Progression  Goal: Optimal Wound Healing  Outcome: Not Progressing     Problem: Pain Acute  Goal: Optimal Pain Control and Function  Intervention: Prevent or Manage Pain  Recent Flowsheet Documentation  Taken 3/2/2023 1900 by Vu Dolan RN  Medication Review/Management: medications reviewed   Goal Outcome Evaluation:                  Patient A and O to self and family. Bed bound. Wound vac in place. Patient refused first set meds and ointment. Patient ate  0% dinner. Frequent attempts and encouragement to eat but patient refused. Repositioned and cleaned twice this shift. Patient triple lumen PICC flushing sluggishly. TPA ordered and administered in 2 of the 3 lines. Lines flushing a little better but still no blood return.   Patient family in room til 10, and although they were very helpful on encouraging patient to take meds, they were re-educated on hospital policy regarding departure times of guests. Family left.

## 2023-03-03 NOTE — PROGRESS NOTES
[facility]    Medicine Progress Note - Hospitalist Service    Date of Admission:  2/16/2023    Assessment & Plan   Cristal Preciado is a 72 year old female with h/o bedbound nursing home resident, sacral decubitus ulcer (present on admission), CVA, dementia, neuropathy, DM2, CKD, CAD, failure to thrive admitted on 2/16/2023 with infected new sacral decubitus ulcer with possible necrotizing fasciitis.      Severe sepsis secondary to infected stage IV sacral and right greater trochanter decubitus ulcers, necrotizing fasciitis:  On admission, sepsis criteria met with leukocytosis (WBC 14.1), tachycardia and lactic acidosis (Lactic acid 6.4). Source being infected infected stage IV sacral and right greater trochanter decubitus ulcers.   Received I&D by Dr. Ragsdale on 2/17. Found gross necrosis of the subcutaneous tissues and then fascia extending all the way down to the sacrum bone.  Cultures growing Proteus, E.coli and Enterococcus. Blood culture has no growth.   - ID consulted and input appreciated. Initially treated with Vancomycin, clindamycin and Zosyn. Now on Zosyn per culture sensitivity.  Plan:  - Continue with current medical management,no new changes at this time  - Continue with Zosyn IV.  Per ID, plan for 4-6 weeks of IV Zosyn. PICC line in right arm.  - Wound VAC placed. Continue wound care.     Moderate malnutrition, weight loss, failure to thrive:   Malnutrion status as determined by dietitian, see their notes for details.   Patient was admitted from 2/1-2/3/23 for evaluation of weight loss, failure to thrive and decreased appetite. GI was consulted. Per consult note on 2/3/23, the above issues are related to patient's multiple comorbidities and age rather than an acute GI process. Family demanded an EGD to be done. Outpatient EGD was scheduled on 3/1/23.  After this admission, has been eating little or none. The etiology is multifactorial. Her poor nutrition status affects her wound  healing. Has ongoing discussion about tube feed.   - Patient has history of oropharyngeal dysphagia. Per SLP evaluation on 2/16/23, recommends level 4/pureed textures and thin liquids. Per family, patient does not like puree diet and that would contribute to her poor appetite. Patient wanted to upgrade to soft bite size diet with thin liquid on 2/21.  - Per family, patient's appetite could be affected by mood easily. Brother recently passed away, which affects her mood to eat. Her daughter Valerie agrees to have a trial of Remeron (started 2/26).   - Patient defers the decision about tube feed to her children. The initial plan was to place a temporary NJ feeding tube until the family make decision on PEG tube.  Previous hospitalist discussed with her daughter Court and Valerie, they would like to hold on the NJ tube placement. They feel patient has shown signs of increased oral intake when family bring in food from home. Family has been bringing in patient's favorite food for the past few days. However, patient did not really eat much.  Discussed with nursing staff to document the amount of food patient eats.  Continue calorie counts. Family still undecided about feeding tube  - Per Valerie, she still wants the EGD to be done since patient has a history of ulcer. Consulted GI.  Plan:  -Patient declined EGD that was scheduled  3/1   -Continue with PPI per GI  --On 3/3 patient continues to have poor p.o. intake     Type II DM : HgbA1c on 1/3/2023 was 7.6 %. Patient is refusing to eat and has had numerous hypoglycemic episodes. PTA lantus 20 units QAM and Novolog sliding scale. PTA Victoza and metformin on hold.  - On D5 drip.  Decrease Lantus to 2 units given poor p.o. intake.. Novolog sliding scale.  - Hypoglycemic protocol     HTN: Blood pressure is well controlled. Continue PTA metoprolol.     History of CAD: on metoprolol and lipitor.  Restart aspirin on 3/3     Acute blood loss anemia: Hgb was 5.7 after surgery.  Transfused 2 units of packed RBCs  - Continue to monitor and transfuse to keep Hgb > 7. Hemoglobin has been stable.     UTI: Continue with  Zosyn IV. Urine culture grew mixed urogenital brittani     Hyponatremia: Mild. Likely due to hypovolemia and poor oral intake. Resolved.      Hypercalcemia: resolved may have been due to dehydration     Hypokalemia: replacement as needed per protocol. Monitor with BMP     Hypomagnesemia: Replace per protocol     Pressure ulcer on bilateral heels, unstageable: Present on admission. L heel: 4  x 5.5 cm  and R heel: 2  x 2 cm, the skin surface is black.  - Wound care consulted. On offload boots.      History of ulcer, GERD: on PPI.     Hyperlipidemia: on statin.     Goal of care: Per chart review, patient has been a nursing home resident in [facility] since Oct 2011 due to lower extremity weakness from a stroke. She has become bed bound and wheelchair bound for 5 years. Had 4 admissions for the past two months for evaluation of UTI, encephalopathy, malnutrition, and weight loss.   - Palliative care consulted. Plan a family conference   - Heatare representatives: Patient has been able to make decision herself. However, for the past few days, she has become agitated easily and often refuses to answer questions. She has indicated to let her children to make decision for her. She previosuly told staff that she wants Court to be the primary representative. However, her other daughter told the provider that there are some disagreement among the siblings. She is not aware about the care conference coming up. Valerie states that she has been the primary care giver for her mother for a long time and knows her mother the best. She would like to be the primary contact and patient's primary healthcare representative. Patient's son, Camron, has been banned from the hospital due to threatening behaviors towards staff. Her daughters Court and Valerie have been visiting her.  Patient told this writer today that both Court and Valerie can make decisions for her.   Discussed with palliative care today. Plan to ask patient to sign ACP documents to designate healthcare representatives when she is still able to make decisions.   --Ethics consulted by palliative          Diet: Snacks/Supplements Adult: Magic Cup; With Meals    DVT Prophylaxis: Lovenox  Cornejo Catheter: Not present  Lines: PRESENT      PICC 02/17/23 Triple Lumen Right Basilic Vascular access-Site Assessment: WDL      Cardiac Monitoring: None  Code Status: Full Code      Clinically Significant Risk Factors            # Hypomagnesemia: Lowest Mg = 1.2 mg/dL in last 2 days, will replace as needed   # Hypoalbuminemia: Lowest albumin = 0.9 g/dL at 2/23/2023  7:22 AM, will monitor as appropriate          # DMII: A1C = 7.6 % (Ref range: <5.7 %) within past 6 months    # Moderate Malnutrition: based on nutrition assessment        Disposition Plan      Expected Discharge Date: 03/04/2023    Discharge Delays: Placement - LTC  IV Medication - consider oral or Home Infusion  Procedure Pending (enter procedure & time in comments)  Destination: long-term care facility  Discharge Comments: needs tube feeding, EGD 3/1 in OR  wants new LTC  care conference          Stuart Miranda MD  Hospitalist Service  [facility]  Securely message with [employer] (more info)  Text page via [email or web address] Paging/Directory   ______________________________________________________________________    Interval History   Patient new to me.  Chart reviewed.  No family at bedside.  Discussed with nurse.  Patient has poor appetite and eats beets in pieces.  Ate applesauce and piece of deleon today.  Family is undecided about feeding tube.  Palliative care is helping with the same.  Overall guarded prognosis.    Physical Exam   Vital Signs: Temp: 98  F (36.7  C) Temp src: Oral BP: (!) 145/59 Pulse: 61   Resp: 16 SpO2: 99 % O2 Device: None (Room air)     Weight: 150 lbs 12.71 oz    Chronically ill looking  Patient is sleepy and does answer yes and no questions      Medical Decision Making       35 MINUTES SPENT BY ME on the date of service doing chart review, history, exam, documentation & further activities per the note.  MANAGEMENT DISCUSSED with the following over the past 24 hours: Palliative care and nursing   NOTE(S)/MEDICAL RECORDS REVIEWED over the past 24 hours: Palliative care      Data     I have personally reviewed the following data over the past 24 hrs:    5.0  \   8.4 (L)   / 248     135 (L) 109 (H) 4.0 (L) /  152 (H)   4.3 21 (L) 0.70 \       ALT: 14 AST: 40 (H) AP: 160 (H) TBILI: 0.3   ALB: 1.4 (L) TOT PROTEIN: 4.7 (L) LIPASE: N/A       Imaging results reviewed over the past 24 hrs:   No results found for this or any previous visit (from the past 24 hour(s)).

## 2023-03-04 NOTE — PLAN OF CARE
Problem: Plan of Care - These are the overarching goals to be used throughout the patient stay.    Goal: Absence of Hospital-Acquired Illness or Injury  Intervention: Identify and Manage Fall Risk  Recent Flowsheet Documentation  Taken 3/4/2023 0010 by Debby Escamilla RN  Safety Promotion/Fall Prevention: bed alarm on  Intervention: Prevent Skin Injury  Recent Flowsheet Documentation  Taken 3/4/2023 0010 by Debby Escamilla RN  Body Position:    left    turned  Intervention: Prevent and Manage VTE (Venous Thromboembolism) Risk  Recent Flowsheet Documentation  Taken 3/4/2023 0010 by Debby Escamilla RN  VTE Prevention/Management: SCDs (sequential compression devices) off  Goal: Optimal Comfort and Wellbeing  Outcome: Progressing     Problem: Pain Acute  Goal: Optimal Pain Control and Function  Outcome: Progressing  Intervention: Prevent or Manage Pain  Recent Flowsheet Documentation  Taken 3/4/2023 0010 by Debby Escamilla RN  Sensory Stimulation Regulation: television on  Medication Review/Management: medications reviewed     Problem: Wound Healing Progression  Goal: Optimal Wound Healing  Outcome: Progressing  Intervention: Promote Wound Healing  Recent Flowsheet Documentation  Taken 3/4/2023 0010 by Debby Escamilla RN  Activity Management: bedrest   Goal Outcome Evaluation:  Pt is alert and oriented with confusion. Denies pain. VS WNL. Wound vac to coccyx is almost full at 475 ml in a 500 ml drainage canister,  drainage color is brownish. Canister  changed to a new one. Glucose  At 02AM is 136. Repositioning done.

## 2023-03-04 NOTE — PROGRESS NOTES
Pt will likely need a home wound vac when she returns to LTC so CM started this process on the [facility] protal and uploaded all clinical information to the portal. Just need this to get verified by [facility] and the e-script returned.     STELLA: [id number]

## 2023-03-04 NOTE — PLAN OF CARE
Problem: Pain Acute  Goal: Optimal Pain Control and Function  Outcome: Progressing  Intervention: Develop Pain Management Plan  Recent Flowsheet Documentation  Taken 3/4/2023 0949 by Kellie Olsen RN  Pain Management Interventions:   emotional support   repositioned  Intervention: Prevent or Manage Pain  Recent Flowsheet Documentation  Taken 3/4/2023 0900 by Kellie Olsen, RN  Medication Review/Management: medications reviewed     Problem: Electrolyte Imbalance  Goal: Electrolyte Imbalance: Plan of Care  Outcome: Progressing     Problem: Diabetes Comorbidity  Goal: Blood Glucose Level Within Targeted Range  Outcome: Progressing     Problem: Violence Risk or Actual  Goal: Anger and Impulse Control  Outcome: Progressing  Intervention: Minimize Safety Risk  Recent Flowsheet Documentation  Taken 3/4/2023 0900 by Kellie Olsen RN  Enhanced Safety Measures: bed alarm set   Goal Outcome Evaluation:             Pt does not act out aggressively, but does refuse care and refuses to eat.  BG this shift 155 and 141 and received insulin as ordered.  Had both magnesium and Potassium replaced this shift.  Pt has intermittent coccyx pain

## 2023-03-04 NOTE — PROGRESS NOTES
[facility]    Medicine Progress Note - Hospitalist Service    Date of Admission:  2/16/2023    Assessment & Plan   Cristal Preciado is a 72 year old female with h/o bedbound nursing home resident, sacral decubitus ulcer (present on admission), CVA, dementia, neuropathy, DM2, CKD, CAD, failure to thrive admitted on 2/16/2023 with infected new sacral decubitus ulcer with possible necrotizing fasciitis.      Severe sepsis secondary to infected stage IV sacral and right greater trochanter decubitus ulcers, necrotizing fasciitis:  On admission, sepsis criteria met with leukocytosis (WBC 14.1), tachycardia and lactic acidosis (Lactic acid 6.4). Source being infected infected stage IV sacral and right greater trochanter decubitus ulcers.   Received I&D by Dr. Ragsdale on 2/17. Found gross necrosis of the subcutaneous tissues and then fascia extending all the way down to the sacrum bone.  Cultures growing Proteus, E.coli and Enterococcus. Blood culture has no growth.   - ID consulted and input appreciated. Initially treated with Vancomycin, clindamycin and Zosyn. Now on Zosyn per culture sensitivity.  Plan:  - Continue with current medical management,no new changes at this time  - Continue with Zosyn IV.  Per ID, plan for 4-6 weeks of IV Zosyn. PICC line in right arm.  - Wound VAC placed. Continue wound care.     Moderate malnutrition, weight loss, failure to thrive:   Malnutrion status as determined by dietitian, see their notes for details.   Patient was admitted from 2/1-2/3/23 for evaluation of weight loss, failure to thrive and decreased appetite. GI was consulted. Per consult note on 2/3/23, the above issues are related to patient's multiple comorbidities and age rather than an acute GI process. Family demanded an EGD to be done. Outpatient EGD was scheduled on 3/1/23.  After this admission, has been eating little or none. The etiology is multifactorial. Her poor nutrition status affects her wound  healing. Has ongoing discussion about tube feed.   - Patient has history of oropharyngeal dysphagia. Per SLP evaluation on 2/16/23, recommends level 4/pureed textures and thin liquids. Per family, patient does not like puree diet and that would contribute to her poor appetite. Patient wanted to upgrade to soft bite size diet with thin liquid on 2/21.  - Per family, patient's appetite could be affected by mood easily. Brother recently passed away, which affects her mood to eat. Her daughter Valerie agrees to have a trial of Remeron (started 2/26).   - Patient defers the decision about tube feed to her children. The initial plan was to place a temporary NJ feeding tube until the family make decision on PEG tube.  Previous hospitalist discussed with her daughter Court and Valerie, they would like to hold on the NJ tube placement. They feel patient has shown signs of increased oral intake when family bring in food from home. Family has been bringing in patient's favorite food for the past few days. However, patient did not really eat much.  Discussed with nursing staff to document the amount of food patient eats.  Continue calorie counts. Family still undecided about feeding tube  - Per Valerie, she still wants the EGD to be done since patient has a history of ulcer. Consulted GI.  Plan:  -Patient declined EGD that was scheduled  3/1   -Continue with PPI per GI  --On 3/3 patient continues to have poor p.o. intake  --Per Court, patient's autonomy should be respected and no endoscopy or feeding tube should be inserted as per patient's wishes.  --Unfortunately, none of the daughter have had power of  to make medical decisions for the patient.  --Patient's overall condition is worsening due to poor p.o. intake  -- Discussed with patient's daughter.  Patient's daughter Court would consider NG tube for short-term feeding         Type II DM : HgbA1c on 1/3/2023 was 7.6 %. Patient is refusing to eat and has had  numerous hypoglycemic episodes. PTA lantus 20 units QAM and Novolog sliding scale. PTA Victoza and metformin on hold.  - On D5 drip.  Decrease Lantus to 2 units given poor p.o. intake.. Novolog sliding scale.  - Hypoglycemic protocol     HTN: Blood pressure is well controlled. Continue PTA metoprolol.     History of CAD: on metoprolol and lipitor.  Restart aspirin on 3/3     Acute blood loss anemia: Hgb was 5.7 after surgery. Transfused 2 units of packed RBCs  - Continue to monitor and transfuse to keep Hgb > 7. Hemoglobin has been stable.     UTI: Continue with  Zosyn IV. Urine culture grew mixed urogenital brittani     Hyponatremia: Mild. Likely due to hypovolemia and poor oral intake. Resolved.      Hypercalcemia: resolved may have been due to dehydration     Hypokalemia: replacement as needed per protocol. Monitor with BMP     Hypomagnesemia: Replace per protocol     Pressure ulcer on bilateral heels, unstageable: Present on admission. L heel: 4  x 5.5 cm  and R heel: 2  x 2 cm, the skin surface is black.  - Wound care consulted. On offload boots.      History of ulcer, GERD: on PPI.     Hyperlipidemia: on statin.     Goal of care: Per chart review, patient has been a nursing home resident in [facility] since Oct 2011 due to lower extremity weakness from a stroke. She has become bed bound and wheelchair bound for 5 years. Had 4 admissions for the past two months for evaluation of UTI, encephalopathy, malnutrition, and weight loss.   - Palliative care consulted. Plan a family conference   - Heathcare representatives: Patient has been able to make decision herself. However, for the past few days, she has become agitated easily and often refuses to answer questions. She has indicated to let her children to make decision for her. She previosuly told staff that she wants Court to be the primary representative. However, her other daughter told the provider that there are some disagreement among the  "siblings. She is not aware about the care conference coming up. Valerie states that she has been the primary care giver for her mother for a long time and knows her mother the best. She would like to be the primary contact and patient's primary healthcare representative. Patient's son, Camron, has been banned from the hospital due to threatening behaviors towards staff. Her daughters Court and Valerie have been visiting her. Patient told this previous hospitalist that both Court and Valerie can make decisions for her.   Discussed with palliative care today. Plan to ask patient to sign ACP documents to designate healthcare representatives when she is still able to make decisions.   --Ethics consulted by palliative.  There is ongoing disagreement between the daughters Valerie and Court.  None of them of her health power of .  Moreover patient is refusing feeding tube.   --In the meantime, Court would talk to her mother about NJ tube for short-term feeding.          Diet: Snacks/Supplements Adult: Magic Cup; With Meals  Regular Diet Adult    DVT Prophylaxis: Lovenox  Cornejo Catheter: Not present  Lines: PRESENT      PICC 02/17/23 Triple Lumen Right Basilic Vascular access-Site Assessment: WDL      Cardiac Monitoring: None  Code Status: Full Code      Clinically Significant Risk Factors            # Hypomagnesemia: Lowest Mg = 1.5 mg/dL in last 2 days, will replace as needed   # Hypoalbuminemia: Lowest albumin = 0.9 g/dL at 2/23/2023  7:22 AM, will monitor as appropriate          # DMII: A1C = 7.6 % (Ref range: <5.7 %) within past 6 months   # Overweight: Estimated body mass index is 26.48 kg/m  as calculated from the following:    Height as of 2/9/23: 1.702 m (5' 7\").    Weight as of this encounter: 76.7 kg (169 lb 1.5 oz).   # Moderate Malnutrition: based on nutrition assessment        Disposition Plan      Expected Discharge Date: 03/06/2023    Discharge Delays: Placement - LTC  IV Medication - " consider oral or Home Infusion  Procedure Pending (enter procedure & time in comments)  Destination: long-term care facility  Discharge Comments: needs tube feeding, EGD 3/1 in OR  wants new LTC  care conference          Stuart Miranda MD  Hospitalist Service  [facility]  Securely message with [employer] (more info)  Text page via [employer] Paging/Directory   ______________________________________________________________________    Interval History   Patient continues to have poor appetite.  She refused feeding tube and endoscopy.  Patient drifts to sleep.  Appears very lethargic.  Called patient's daughter Valerie and Court separately.  Discussion as above    Physical Exam   Vital Signs: Temp: 97.6  F (36.4  C) Temp src: Axillary BP: 139/63 Pulse: 58   Resp: 18 SpO2: 100 % O2 Device: None (Room air)    Weight: 169 lbs 1.49 oz    Chronically ill looking  Patient is sleepy and does answer yes and no questions  Anasarca    Medical Decision Making       55 MINUTES SPENT BY ME on the date of service doing chart review, history, exam, documentation & further activities per the note.  MANAGEMENT DISCUSSED with the following over the past 24 hours: Daughters Court and Valerie  NOTE(S)/MEDICAL RECORDS REVIEWED over the past 24 hours: Palliative care      Data     I have personally reviewed the following data over the past 24 hrs:    N/A  \   N/A   / N/A     N/A N/A N/A /  139 (H)   3.7 N/A N/A \       Imaging results reviewed over the past 24 hrs:   No results found for this or any previous visit (from the past 24 hour(s)).

## 2023-03-04 NOTE — PLAN OF CARE
Problem: Plan of Care - These are the overarching goals to be used throughout the patient stay.    Goal: Optimal Comfort and Wellbeing  Outcome: Progressing     Problem: Pain Acute  Goal: Optimal Pain Control and Function  Outcome: Progressing     Goal Outcome Evaluation:                      Pt denied pain while resting in bed. Voiced pain while being repositioned.   Pt incontinent of stool this shift, skin cleaned and dried well. Would vac in place.   Pt ate 2 pieces of deleon for dinner. Declined magic cup.

## 2023-03-05 NOTE — PLAN OF CARE
Problem: Wound Healing Progression  Goal: Optimal Wound Healing  Outcome: Not Progressing   Goal Outcome Evaluation:       Regular diet with magic cup at lunch and dinner    Intake: Poor,  pt declines most food and fluids, she did eat 2 pieces of deleon yesterday per nursing.    Pt is receiving IV maintenance fluids.    Palliative care is involved.  No decision made yet regarding tube feeding, pt has declined previously. A daughter will speak to her again about an [place] tube.

## 2023-03-05 NOTE — PROGRESS NOTES
[facility]    Medicine Progress Note - Hospitalist Service    Date of Admission:  2/16/2023    Assessment & Plan   Cristal Preciado is a 72 year old female with h/o bedbound nursing home resident, sacral decubitus ulcer (present on admission), CVA, dementia, neuropathy, DM2, CKD, CAD, failure to thrive admitted on 2/16/2023 with infected new sacral decubitus ulcer with possible necrotizing fasciitis.      Severe sepsis secondary to infected stage IV sacral and right greater trochanter decubitus ulcers, necrotizing fasciitis:  On admission, sepsis criteria met with leukocytosis (WBC 14.1), tachycardia and lactic acidosis (Lactic acid 6.4). Source being infected infected stage IV sacral and right greater trochanter decubitus ulcers.   Received I&D by Dr. Ragsdale on 2/17. Found gross necrosis of the subcutaneous tissues and then fascia extending all the way down to the sacrum bone.  Cultures growing Proteus, E.coli and Enterococcus. Blood culture has no growth.   - ID consulted and input appreciated. Initially treated with Vancomycin, clindamycin and Zosyn. Now on Zosyn per culture sensitivity.  Plan:  - Continue with current medical management,no new changes at this time  - Continue with Zosyn IV.  Per ID, plan for 4-6 weeks of IV Zosyn. PICC line in right arm.  - Wound VAC placed. Continue wound care.     Moderate malnutrition, weight loss, failure to thrive:   Malnutrion status as determined by dietitian, see their notes for details.   Patient was admitted from 2/1-2/3/23 for evaluation of weight loss, failure to thrive and decreased appetite. GI was consulted. Per consult note on 2/3/23, the above issues are related to patient's multiple comorbidities and age rather than an acute GI process. Family demanded an EGD to be done. Outpatient EGD was scheduled on 3/1/23.  After this admission, has been eating little or none. The etiology is multifactorial. Her poor nutrition status affects her wound  healing. Has ongoing discussion about tube feed.   - Patient has history of oropharyngeal dysphagia. Per SLP evaluation on 2/16/23, recommends level 4/pureed textures and thin liquids. Per family, patient does not like puree diet and that would contribute to her poor appetite. Patient wanted to upgrade to soft bite size diet with thin liquid on 2/21.  - Per family, patient's appetite could be affected by mood easily. Brother recently passed away, which affects her mood to eat. Her daughter Valerie agrees to have a trial of Remeron (started 2/26).   - Patient defers the decision about tube feed to her children. The initial plan was to place a temporary NJ feeding tube until the family make decision on PEG tube.  Previous hospitalist discussed with her daughter Court and Valerie, they would like to hold on the NJ tube placement. They feel patient has shown signs of increased oral intake when family bring in food from home. Family has been bringing in patient's favorite food for the past few days. However, patient did not really eat much.  Discussed with nursing staff to document the amount of food patient eats.  Continue calorie counts. Family still undecided about feeding tube  - Per Valerie, she still wants the EGD to be done since patient has a history of ulcer. Consulted GI.  Plan:  -Patient declined EGD that was scheduled  3/1   -Continue with PPI per GI  --On 3/3 patient continues to have poor p.o. intake  --Per Court, patient's autonomy should be respected and no endoscopy or feeding tube should be inserted as per patient's wishes.  --Unfortunately, none of the daughter have had power of  to make medical decisions for the patient.  --Patient's overall condition is worsening due to poor p.o. intake  -- Discussed with patient's daughter.  Patient's daughter Court would consider NG tube for short-term feeding.  Court will talk to her mother on 3/5 to get final decision regarding NJ tube.           Type II DM : HgbA1c on 1/3/2023 was 7.6 %. Patient is refusing to eat and has had numerous hypoglycemic episodes. PTA lantus 20 units QAM and Novolog sliding scale. PTA Victoza and metformin on hold.  - On D5 drip.  Tolerating Lantus to 2 units given poor p.o. intake.. Novolog sliding scale.  - Hypoglycemic protocol     HTN: Blood pressure is well controlled. Continue PTA metoprolol.     History of CAD: on metoprolol and lipitor.  Restart aspirin on 3/3     Acute blood loss anemia: Hgb was 5.7 after surgery. Transfused 2 units of packed RBCs  - Continue to monitor and transfuse to keep Hgb > 7. Hemoglobin has been stable.     UTI: Continue with  Zosyn IV. Urine culture grew mixed urogenital brittani     Hyponatremia: Mild. Likely due to hypovolemia and poor oral intake. Resolved.      Hypercalcemia: resolved may have been due to dehydration     Hypokalemia: replacement as needed per protocol. Monitor with BMP     Hypomagnesemia: Replace per protocol     Pressure ulcer on bilateral heels, unstageable: Present on admission. L heel: 4  x 5.5 cm  and R heel: 2  x 2 cm, the skin surface is black.  - Wound care consulted. On offload boots.      History of ulcer, GERD: on PPI.     Hyperlipidemia: on statin.     Goal of care: Per chart review, patient has been a nursing home resident in [facility] since Oct 2011 due to lower extremity weakness from a stroke. She has become bed bound and wheelchair bound for 5 years. Had 4 admissions for the past two months for evaluation of UTI, encephalopathy, malnutrition, and weight loss.   - Palliative care consulted. Plan a family conference   - Heatare representatives: Patient has been able to make decision herself. However, for the past few days, she has become agitated easily and often refuses to answer questions. She has indicated to let her children to make decision for her. She previosuly told staff that she wants Court to be the primary representative.  However, her other daughter told the provider that there are some disagreement among the siblings. She is not aware about the care conference coming up. Valerie states that she has been the primary care giver for her mother for a long time and knows her mother the best. She would like to be the primary contact and patient's primary healthcare representative. Patient's son, Camron, has been banned from the hospital due to threatening behaviors towards staff. Her daughters Court and Valerie have been visiting her. Patient told this previous hospitalist that both Court and Valerie can make decisions for her.   Discussed with palliative care. Plan to ask patient to sign ACP documents to designate healthcare representatives when she is still able to make decisions.   --Ethics consulted by palliative.  There is ongoing disagreement between the daughters Valerie and Court.  None of them of her health power of .  Moreover patient is refusing feeding tube.   --In the meantime, Court would talk to her mother about NJ tube for short-term feeding.  -- On 3/5, patient is agreeable to NG tube.  I did inform Valerie and Court regarding the same.  Court will talk to her mother today to get a final decision regarding the NJ tube.          Diet: Snacks/Supplements Adult: Magic Cup; With Meals  Regular Diet Adult    DVT Prophylaxis: Lovenox  Cornejo Catheter: Not present  Lines: PRESENT      PICC 02/17/23 Triple Lumen Right Basilic Vascular access-Site Assessment: WDL      Cardiac Monitoring: None  Code Status: Full Code      Clinically Significant Risk Factors            # Hypomagnesemia: Lowest Mg = 1.5 mg/dL in last 2 days, will replace as needed   # Hypoalbuminemia: Lowest albumin = 0.9 g/dL at 2/23/2023  7:22 AM, will monitor as appropriate          # DMII: A1C = 7.6 % (Ref range: <5.7 %) within past 6 months   # Overweight: Estimated body mass index is 26.48 kg/m  as calculated from the following:     "Height as of 2/9/23: 1.702 m (5' 7\").    Weight as of this encounter: 76.7 kg (169 lb 1.5 oz).   # Moderate Malnutrition: based on nutrition assessment        Disposition Plan     Expected Discharge Date: 03/06/2023    Discharge Delays: Placement - LTC  IV Medication - consider oral or Home Infusion  Procedure Pending (enter procedure & time in comments)  Destination: long-term care facility  Discharge Comments: needs tube feeding, EGD 3/1 in OR  wants new LTC  care conference          Stuart Miranda MD  Hospitalist Service  [facility]  Securely message with [employer] (more info)  Text page via [employer] Paging/Directory   ______________________________________________________________________    Interval History   Patient continues to have poor appetite.  Patient advised me to call [facility] regarding short-term feeding tube.  After explanation regarding the short-term feeding tube patient is agreeable to the same.    Physical Exam   Vital Signs: Temp: 98  F (36.7  C) Temp src: Oral BP: (!) 158/62 Pulse: 62   Resp: 18 SpO2: 99 % O2 Device: None (Room air)    Weight: 169 lbs 1.49 oz    Chronically ill looking  Patient is sleepy and does answer yes and no questions  Anasarca    Medical Decision Making       35 MINUTES SPENT BY ME on the date of service doing chart review, history, exam, documentation & further activities per the note.  MANAGEMENT DISCUSSED with the following over the past 24 hours: Becky Yun and Valerie  NOTE(S)/MEDICAL RECORDS REVIEWED over the past 24 hours: Palliative care      Data     I have personally reviewed the following data over the past 24 hrs:    N/A  \   N/A   / N/A     N/A N/A N/A /  100 (H)   3.6 N/A N/A \       Imaging results reviewed over the past 24 hrs:   No results found for this or any previous visit (from the past 24 hour(s)).  "

## 2023-03-05 NOTE — PROGRESS NOTES
Care Management Follow Up    Length of Stay (days): 17    Expected Discharge Date: 2023     Concerns to be Addressed: discharge planning     Patient plan of care discussed at interdisciplinary rounds: Yes    Anticipated Discharge Disposition: Long Term Care     Anticipated Discharge Services: Other (see comment) (nursing care services, assistance with ADLs, care and supervision)  Anticipated Discharge DME: None    Patient/family educated on Medicare website which has current facility and service quality ratings: yes  Education Provided on the Discharge Plan:    Patient/Family in Agreement with the Plan: yes    Referrals Placed by CM/SW: Post Acute Facilities  Private pay costs discussed: Not applicable    Additional Information:  Chart reviewed.   Family conference held last week on . In attendance were hospital SW,  patient, her daughter Valerie and palliative care provider. Daughter Alexandra called and message left by palliative care provider, no answer.   Palliative care discussed nutrition, feeding tube, wounds.   Patient able to answer questions accurately- her , current season, president, current location.     Patient alert and times and sleeping during other times of the meeting. She identified both of her daughters as being her primary care contacts for decision making. Daughter Valerie acknowledges there are family dynamics in play, she and her sister Alexandra do not communicate well and there are disagreements.    Risk management may need to be consulted for further direction on care of patient.      Daughters:  Valerie ([phone])  Alexandra ([phone])     Patient resides at [facility] where she shares a room with her daughter who is also in LTC. Patient identifies returning to her room with her daughter as a goal.     3/5 update: Hospitalist indicates patient's daugthers are still at odds with decision making and care plan. Patient is eating very little and hospitalist would like  decision made re: tube feeding. Patient also likely needs wound vac on discharge. CM to continue to follow to assist with discussions with family and discharge planning needs.    Liliya Neal, CATRACHITOSW

## 2023-03-05 NOTE — PLAN OF CARE
Problem: Oral Intake Inadequate  Goal: Improved Oral Intake  Outcome: Not Progressing   Goal Outcome Evaluation:                        Problem: Electrolyte Imbalance  Goal: Electrolyte Imbalance: Plan of Care  Outcome: Progressing     Problem: Diabetes Comorbidity  Goal: Blood Glucose Level Within Targeted Range  Outcome: Progressing       Pt refusing to eat, but was encouraged to help with wound healing.  K+ was 3.6 today and required supplement, but Magnesium WNL at 1.9.  Both to be rechecked tomorrow morning

## 2023-03-05 NOTE — PLAN OF CARE
Problem: Plan of Care - These are the overarching goals to be used throughout the patient stay.    Goal: Optimal Comfort and Wellbeing  Outcome: Progressing     Problem: Risk for Delirium  Goal: Optimal Coping  Outcome: Progressing     Problem: Risk for Delirium  Goal: Improved Attention and Thought Clarity  Outcome: Progressing     Problem: Pain Acute  Goal: Optimal Pain Control and Function  Outcome: Progressing  Intervention: Prevent or Manage Pain  Recent Flowsheet Documentation  Taken 3/5/2023 0118 by Marisa Garcia RN  Medication Review/Management: medications reviewed   Goal Outcome Evaluation:  No new events overnight.  Pt turned and repositioned for comfort.  Most comfortable on left side.  Pt denied pain except with repositioning.  Zosyn infusing overnight without incident.  Po fluids offered but pt declined.  Wound vac dressing intact.  Pt more agreeable to cares.

## 2023-03-05 NOTE — PROGRESS NOTES
[facility] Hospitalist Progress Note  [facility]  Admission date: 2/16/2023    Summary:    72F h/o bedbound nursing home resident, sacral decubitus ulcer (present on admission), CVA, dementia, neuropathy, DM2, CKD, CAD, failure to thrive admitted on 2/16/2023 with infected new sacral decubitus ulcer with possible necrotizing fasciitis.  Underwent I&D (2/17) with necrotizing tissue down to the sacrum and therefore with osteomyelitis.      Course has been complicated by malnutrition, limited PO, and family discord.    Assessment/Plan    #Severe sepsis secondary to infected stage IV sacral and right greater trochanter decubitus ulcers with sacral osteomyelitis  -zosyn x 4-6 weeks from source control (2/17).  PICC placed  -VAC    #Moderate malnutrition, weight loss, failure to thrive  -review of prior notes regarding EGD, NJ noted  -calorie counts ongoing.  Encouraged supplements  -NJ is short term, if needs alternative feeding will end up needing G-tube.   If calorie counts are unacceptable and patient still agreeable would ask GI about EGD and G-tube vs. IR  Placement.    #Type II DM : HgbA1c on 1/3/2023 was 7.6 % and multiple hypoglycemic episodes in setting of limited PO  Home regimen: lantus 20 units QAM and Novolog sliding scale. PTA Victoza and metformin on hold.  Inpatient:  Lantus to 2 units, sliding scale insulin     #HTN: metoprolol.     #History of CAD:  Metoprolol, lipitor, asa      #Goal of care:   -reviewed prior note from Dr. Miranda outlining issues.  -some family discord regarding feeding tubes.  -In my visit 03/06 Ms. Preciado was agreeable to alterative feeding if needed.  She also nominated Court as her health care surrogate if needed.  If this is her consistent wish we should have Ms. Preciado sign paperwork to that effect.  I believe Ms. Preciado has capacity to nominate her daughter as her health care agent.      -Acute blood loss anemia: Hgb was 5.7 after surgery. Transfused 2  "units of packed RBCs  - Continue to monitor and transfuse to keep Hgb > 7. Hemoglobin has been stable.   -Hyponatremia, hypokalemia, hypomag  -hypercalcemia - mild on admission and probably from immobility vs. dehydration.  resolved  -Pressure ulcer on bilateral heels, unstageable: Present on admission. L heel: 4  x 5.5 cm  and R heel: 2  x 2 cm, the skin surface is black.  - Wound care consulted. On offload boots.    -History of ulcer, GERD: on PPI.   -Hyperlipidemia: statin.         Checklist:  Code Status: Full Code    Diet: Snacks/Supplements Adult: Magic Cup; With Meals  Regular Diet Adult     DVT px:  Enoxaparin (Lovenox) SQ    Disposition and Discharge Planning  Auto-populated from discharge tab:    Expected Discharge Date: 03/06/2023    Discharge Delays: Placement - LTC  IV Medication - consider oral or Home Infusion  Procedure Pending (enter procedure & time in comments)  Destination: long-term care facility  Discharge Comments: needs tube feeding, EGD 3/1 in OR  wants new LTC  care conference         System Identified Risk Variables  Auto-populated based on system request - if relevant they will be addressed above:  Clinically Significant Risk Factors            # Hypomagnesemia: Lowest Mg = 1.5 mg/dL in last 2 days, will replace as needed   # Hypoalbuminemia: Lowest albumin = 0.9 g/dL at 2/23/2023  7:22 AM, will monitor as appropriate          # DMII: A1C = 7.6 % (Ref range: <5.7 %) within past 6 months   # Overweight: Estimated body mass index is 26.48 kg/m  as calculated from the following:    Height as of 2/9/23: 1.702 m (5' 7\").    Weight as of this encounter: 76.7 kg (169 lb 1.5 oz).   # Moderate Malnutrition: based on nutrition assessment            Interval Events/Subjective/Notable results:    My first first visit with Ms. Preciado.  She is alert, interactive and reports unprompted she is ready for a feeding tube via the nose.    Ms. Preciado was clear that she would want Court to be her " decision maker if she is unable to make decisions.  Started to discuss that PEG would really be needed for malnutrition as NJ is quite short term.  Encouraged PO intake especially supplements      Objective    Vital signs in last 24 hours  Temp:  [98  F (36.7  C)-98.2  F (36.8  C)] 98  F (36.7  C)  Pulse:  [58-62] 62  Resp:  [18] 18  BP: (140-158)/(60-67) 158/62  SpO2:  [98 %-100 %] 99 % O2 Device: None (Room air)    Weight:   169 lbs 1.49 oz  Body mass index is 26.48 kg/m .    Intake/Output last 3 shifts  I/O last 3 completed shifts:  In: 819 [I.V.:819]  Out: 550 [Urine:550]    Physical Exam  General:  Alert, cooperative, no distress, frail  Neurologic:  oriented, facial symmetry preserved, fluent speech.   Psych: calm  HEENT:  Anicteric, MMM  CV: RRR no MRG, normal S1 and S2, no pitting edema  Lungs: CTAB.  Easyrespirations  Abd: soft, NT  Skin: no rashes or jaundice noted on exposed skin.    Cornejo Catheter: Not present  Lines: PRESENT      PICC 02/17/23 Triple Lumen Right Basilic Vascular access-Site Assessment: WDL         Medical Decision Making     Attempt to call Court, no answer or voicemail.        Jeni Gonzalez MD, [facility]  Internal Medicine Hospitalist

## 2023-03-06 NOTE — PLAN OF CARE
"  Problem: Plan of Care - These are the overarching goals to be used throughout the patient stay.    Goal: Optimal Comfort and Wellbeing  Outcome: Progressing     Problem: Risk for Delirium  Goal: Optimal Coping  Outcome: Progressing   Goal Outcome Evaluation:  No new events overnight.  Pt denied pain except with repositioning.  Wound vac patent with good suction.  Pt drank a few sips of orange juice but otherwise declined anything po.  Pt asked writer when she was going to have \"the surgery.\"  When asked what surgery she was referring to she stated \"the one where they put the tube in my nose.\"                          "

## 2023-03-06 NOTE — PLAN OF CARE
Goal Outcome Evaluation:      Plan of Care Reviewed With: patient    Overall Patient Progress: improvingOverall Patient Progress: improving    Problem: Plan of Care - These are the overarching goals to be used throughout the patient stay.    Goal: Absence of Hospital-Acquired Illness or Injury  Intervention: Prevent Skin Injury  Recent Flowsheet Documentation  Taken 3/6/2023 0807 by Yolanda Ambrosio, RN  Body Position: right   Wound vac site was changed today by the WOCN RN. Patient has been turned from side to side several times. Rooke boots in place bilateral lower extremities    Problem: Pain Acute  Goal: Optimal Pain Control and Function  Outcome: Progressing   Has denied pain all shift.  Did medicate once with tylenol prior to wound vac change.     Problem: Electrolyte Imbalance  Goal: Electrolyte Imbalance: Plan of Care  Outcome: Progressing   Was replaced with magnesium and potassium supplements, recheck in the morning.    Problem: Oral Intake Inadequate  Goal: Improved Oral Intake  Outcome: Progressing   Did eat 3 pieces of deleon, several bites of egg and drank 100% of orange juice. Has not eaten anything for lunch yet, she says she's not hungry. Will re approach. Also took several bites of applesauce with medications.

## 2023-03-06 NOTE — PLAN OF CARE
Problem: Plan of Care - These are the overarching goals to be used throughout the patient stay.    Goal: Absence of Hospital-Acquired Illness or Injury  Outcome: Progressing  Intervention: Identify and Manage Fall Risk  Recent Flowsheet Documentation  Taken 3/5/2023 1600 by Maggy Soria, RN  Safety Promotion/Fall Prevention:   bed alarm on   fall prevention program maintained  Goal: Optimal Comfort and Wellbeing  Intervention: Monitor Pain and Promote Comfort  Recent Flowsheet Documentation  Taken 3/5/2023 1528 by Maggy Soria, RN  Pain Management Interventions: medication (see MAR)  Intervention: Provide Person-Centered Care  Recent Flowsheet Documentation  Taken 3/5/2023 1600 by Maggy Soria, RN  Trust Relationship/Rapport:   care explained   questions answered  Goal: Readiness for Transition of Care  Outcome: Progressing     Goal Outcome Evaluation:  Patient ordered toast and deleon for dinner and ate the 3 pieces of deleon. Daughter also brought some of the patient's favorite food and patient declined. Patient has fluids running. Patient did allow repositioning. Wound vac is in place.

## 2023-03-06 NOTE — PROGRESS NOTES
[facility]    Infectious Disease Progress Note    Date of Service : 03/06/2023     Assessment & Plan   Cristal Preciado is a 72 year old female who was admitted on 2/16/2023.     ASSESSMENT:  1. CVA, sacral decubitus ulceration on admission, status post debridement of sacral ulcer and debridement of right hip ulcer on 2/17/2023  2. Osteomyelitis, both ulcerations go down to bone per operative report  3. Infected decubitus ulcer: Proteus, Enterococcus, E. coli cultures, all sensitive to Zosyn.  Tolerating Zosyn well.  CRP improving. Active issue.   4. Multiple comorbid conditions: Diabetes, cognitive impairment, sacral wound, coronary artery disease, hypertension, hyperlipidemia        RECOMMENDATIONS:  1. Continue IV Zosyn  2. Weekly labs: creatinine, cbc with diff, CRP-improving.   3. Plan for 4 to 6 weeks of IV Zosyn from 2/17.  4. Off-loading of all wounds. Heel needs to be in boot per wound care recommendations    Will follow peripherally. Call with questions    Barbie Beard MD  [facility]  Direct messaging: [employer]  On-Call ID provider: [phone], option: 9  ==================================    Interval History   First visit by me. Tolerating antibiotics. Seen with WOC, wounds improving.     Physical Exam   Temp: 98.1  F (36.7  C) Temp src: Axillary BP: 119/59 Pulse: 77   Resp: 18 SpO2: 98 % O2 Device: None (Room air)    Vitals:    02/22/23 0448 03/01/23 0948 03/04/23 0411   Weight: 66.8 kg (147 lb 4.3 oz) 68.4 kg (150 lb 12.7 oz) 76.7 kg (169 lb 1.5 oz)     Vital Signs with Ranges  Temp:  [98  F (36.7  C)-98.2  F (36.8  C)] 98.1  F (36.7  C)  Pulse:  [58-77] 77  Resp:  [18] 18  BP: (119-158)/(59-62) 119/59  SpO2:  [97 %-100 %] 98 %    Constitutional: No apparent distress  Lungs: normal breathing pattern, no crackles or wheezing  Cardiovascular: Regular rate and rhythm, S1 S2  Abdomen: Slightly distended  Skin: warm  Ulcerations, see photos above  decubitus - wound vac in place. Left heel eschar.  Neuro: deconditioned, CVA    Media Information  Document Information    Other:  Photograph      02/16/2023 7:15 PM   Attached To:   Hospital Encounter on 2/16/23     Source Information    Waylon Singer MD [facility]     Media Information  Document Information    Other:  Photograph      02/16/2023 7:14 PM   Attached To:   Hospital Encounter on 2/16/23     Source Information    Waylon Singer MD [facility]           3/6      Medications     dextrose 5% and 0.45% NaCl 50 mL/hr at 03/05/23 1911     - MEDICATION INSTRUCTIONS -         aspirin  81 mg Oral Daily     atorvastatin  80 mg Oral QPM     diclofenac  2 g Topical 4x Daily     dorzolamide-timolol  1 drop Both Eyes BID     DULoxetine  90 mg Oral QAM     enoxaparin ANTICOAGULANT  30 mg Subcutaneous Q24H     insulin aspart  1-7 Units Subcutaneous TID AC     insulin glargine  2 Units Subcutaneous QAM AC     latanoprost  1 drop Both Eyes At Bedtime     metoprolol tartrate  50 mg Oral BID     mirtazapine  15 mg Oral At Bedtime     olopatadine  1 drop Both Eyes Daily     pantoprazole  40 mg Oral BID AC     piperacillin-tazobactam  3.375 g Intravenous Q8H     polyvinyl alcohol  1 drop Both Eyes TID     sodium chloride (PF)  10-30 mL Intracatheter Q8H     sodium chloride (PF)  3 mL Intracatheter Q8H     thiamine  100 mg Oral Daily     traZODone  50 mg Oral At Bedtime       Data   All microbiology laboratory data reviewed.  Recent Labs   Lab Test 03/06/23 0452 03/03/23  0645 03/02/23  0517 03/01/23  0613   WBC  --  5.0 5.1 5.0   HGB  --  8.4* 8.0* 8.1*   HCT  --  28.0* 26.2* 26.7*   MCV  --  94 93 92    248 242 231     Recent Labs   Lab Test 03/06/23  0452 03/03/23  0645 03/02/23  0517   CR 0.85 0.70 0.65     Recent Labs   Lab Test 02/16/23  1431   *     Recent Labs   Lab Test 08/06/19  0835 04/29/15  1420   CULT No growth No growth after 4 weeks        MICROBIOLOGY:    Reviewed   Contains abnormal data Wound Aerobic Bacterial Culture Routine  Order: [id number]   Collected 2/16/2023  2:32 PM      Status: Preliminary result      Visible to patient: No (not released)     Specimen Information: Leg, Right; Wound    0 Result Notes  Culture Culture in progress       2+ Proteus mirabilis Abnormal        1+ Enterococcus faecalis Abnormal        1+ Escherichia coli Abnormal     Preliminary result, confirmation pending.   4+ Normal brittani            Resulting Agency: [facility]     Susceptibility     Proteus mirabilis Enterococcus faecalis     CARLA CARLA     Ampicillin <=2 ug/mL Susceptible <=2 ug/mL Susceptible     Ampicillin/ Sulbactam <=2 ug/mL Susceptible       Cefepime <=1 ug/mL Susceptible       Ceftazidime <=1 ug/mL Susceptible       Ceftriaxone <=1 ug/mL Susceptible       Ciprofloxacin <=0.25 ug/mL Susceptible       Gentamicin <=1 ug/mL Susceptible       Gentamicin Synergy   Susceptible... Susceptible 1     Levofloxacin <=0.12 ug/mL Susceptible       Meropenem <=0.25 ug/mL Susceptible       Penicillin   4 ug/mL Susceptible     Piperacillin/Tazobactam <=4 ug/mL Susceptible       Tobramycin <=1 ug/mL Susceptible       Trimethoprim/Sulfamethoxazole <=1/19 ug/mL Susceptible       Vancomycin   2 ug/mL Susceptible                1 No high level gentamicin resistance found - therefore combination therapy with an aminoglycoside may be indicated for serious enterococcal infections such as bacteremia and endocarditis.            Specimen Collected: 02/16/23  2:32 PM Last Resulted: 02/19/23  3:21 PM           Abscess Aerobic Bacterial Culture Routine  Order: [id number]   Collected 2/17/2023  8:45 PM      Status: Preliminary result      Visible to patient: No (not released)     Specimen Information: Hip, Right; Abscess    0 Result Notes  Culture 2+ Escherichia coli Abnormal        2+ Proteus mirabilis Abnormal        1+ Enterococcus faecalis Abnormal             Resulting Agency: [facility]      Susceptibility     Escherichia coli Proteus mirabilis Enterococcus faecalis     CARLA CARLA CARLA     Ampicillin <=2 ug/mL Susceptible <=2 ug/mL Susceptible <=2 ug/mL Susceptible     Ampicillin/ Sulbactam <=2 ug/mL Susceptible <=2 ug/mL Susceptible       Cefepime <=1 ug/mL Susceptible <=1 ug/mL Susceptible       Ceftazidime <=1 ug/mL Susceptible <=1 ug/mL Susceptible       Ceftriaxone <=1 ug/mL Susceptible <=1 ug/mL Susceptible       Ciprofloxacin <=0.25 ug/mL Susceptible <=0.25 ug/mL Susceptible       Gentamicin <=1 ug/mL Susceptible <=1 ug/mL Susceptible       Gentamicin Synergy     Susceptible... Susceptible 1     Levofloxacin <=0.12 ug/mL Susceptible <=0.12 ug/mL Susceptible       Meropenem <=0.25 ug/mL Susceptible <=0.25 ug/mL Susceptible       Penicillin     4 ug/mL Susceptible     Piperacillin/Tazobactam <=4 ug/mL Susceptible <=4 ug/mL Susceptible       Tobramycin <=1 ug/mL Susceptible <=1 ug/mL Susceptible       Trimethoprim/Sulfamethoxazole <=1/19 ug/mL Susceptible <=1/19 ug/mL Susceptible       Vancomycin     2 ug/mL Susceptible                  1 No high level gentamicin resistance found - therefore combination therapy with an aminoglycoside may be indicated for serious enterococcal infections such as bacteremia and endocarditis.            Specimen Collected: 02/17/23  8:45 PM Last Resulted: 02/20/23 12:42 AM                  7-Day Micro Results     No results found for the last 168 hours.           RADIOLOGY:    Reviewed  CT Pelvis Soft Tissue w Contrast    Result Date: 2/16/2023  EXAM: CT PELVIS SOFT TISSUE W CONTRAST LOCATION: [facility] DATE/TIME: 2/16/2023 6:31 PM INDICATION: Malodorous wounds to left buttock and right hip, severe sepsis on lab testing today COMPARISON: 1/3/2023 TECHNIQUE: CT scan of the pelvis was performed with IV contrast. Multiplanar reformats were obtained. Dose reduction techniques were used. CONTRAST: 100ml isovue 370 FINDINGS: Mild motion artifact.  PELVIC ORGANS: Increased circumferential bladder wall thickening measuring up to 0.8 cm is consistent with cystitis. Hysterectomy. Atherosclerotic vascular disease. MUSCULOSKELETAL: A new sacral decubitus ulcer with subcutaneous emphysema extending into the surrounding soft tissues with associated inflammatory changes. A new decubitus ulcer at the right greater trochanter with surrounding inflammatory changes. No loculated drainable fluid collection. No aggressive osseous erosion. Degenerative changes of the spine and hips.     IMPRESSION: 1.  New sacral decubitus ulcer with subcutaneous emphysema extending into the surrounding soft tissues. This could represent necrotizing fasciitis. 2.  A new right greater trochanter decubitus ulcer. 3.  Cystitis. [Critical Result: Necrotizing fasciitis] Finding was identified on 2/16/2023 6:35 PM. Dr. Singer was contacted by me on 2/16/2023 6:42 PM and verbalized understanding of the critical result.

## 2023-03-06 NOTE — PROGRESS NOTES
[facility] Nurse Inpatient Assessment     Consulted for: Buttocks, heels  3/6 ID was able to see the wounds during the dressing change     Patient History (according to provider note(s):      Cristal Preciado is a 72 year old female with PMH significant for known sacral decubitus ulcer (present on admission), CVA, dementia, neuropathy, DM2, CKD, CAD and FTT admitted on 2/16/2023 with infected new sacral decubitus ulcer with possible necrotizing fasciitis.      1. Sepsis- Admit patient. Infected Sacral Decubitus ulcer/Possible Necrotizing fasciits,UTI,Lactic acid= 6.4, WBC= 14.1. CT pelvis report reviewed. ED physician has already been in contact with General Surgeon on call and patient will be seen in consultation. Continue Vancomycin, Zosyn and Clindamycin. Continue IV fluids. Currently NPO. Repeat labs in a.m. Monitor vitals closely. Will make further recommendations based on clinical course.     2. Possible Necrotizing Fasciitis- ED has contacted General Surgeon on call and patient will be seen in consultation. Continue Vancomycin, Zosyn and Clindamycin. Plan per surgeon.    Areas Assessed:      Pressure Injury Location: Sacrum  (additional pictures available in media tab)                                                        2/27, view into the undermining to bottom half of wound       3/6  Wound type: Pressure Injury     Pressure Injury Stage: 4, present on admission   Wound history/plan of care:   Patient admitted with worsening pressure wounds, was taken for an I&D  Wound base: 30 % slough and bone, 70 % dermis, non-granular tissue and adipose tissue, scant granulation starting. Tissue looks more pale today.     Palpation of the wound bed: boggy      Drainage: scant     Description of drainage: serosanguinous     Measurements (length x width x depth, in cm) 10  x 12  x  3.5 cm      Tunneling N/A     Undermining up to 1.5 cm from 8-11 o'clock  Periwound skin: Intact      Color:  normal and consistent with surrounding tissue, slight purple discoloration near gluteal cleft      Temperature: normal   Odor: none  Pain: moderate, with palpation and during dressing change  Pain intervention prior to dressing change: slow and gentle cares   Treatment goal: Heal , Increase granulation and Protection  STATUS: evolving  Supplies ordered: gathered    My PI Risk Assessment     Sensory Perception: 2 - Very Limited     Moisture: 1 - Constantly moist     Activity: 1 - Bedfast      Mobility: 2 - Very limited     Nutrition: 2 - Probably inadequate      Friction/Shear: 1 - Problem     TOTAL: 9  __________________________________________________________________________________________________________________  [facility]         3/3     3/6  Wound type: Pressure Injury     Pressure Injury Stage: 4, present on admission   Wound history/plan of care:   See above  Wound base: 30 % slough and bone, 70 % subcutaneous tissue       Palpation of the wound bed: normal      Drainage: scant     Description of drainage: serosanguinous     Measurements (length x width x depth, in cm) 10  x 13  x  3 cm      Tunneling N/A     Undermining N/A  Periwound skin: Intact      Color: normal and consistent with surrounding tissue      Temperature: normal   Odor: none  Pain: mild,   Pain intervention prior to dressing change: slow and gentle cares   Treatment goal: Heal , Increase granulation and Protection  STATUS: edges are more defined, thinning yellow slough at center over bone  Supplies ordered: ordered additional large dressing and rings   ________________________________________________________________________________________________________________    Negative pressure wound therapy applied to: Sacrum & [facility] fox   Last photo: see above  Wound due to: Pressure Injury   Wound history/plan of care:      Surgical date: 2/18/23     Date Negative Pressure Wound Therapy initiated: 2/20/23      Interventions in place: repositioning, pressure redistribution with device or dressing, specialty surface in use, moisture/incontinence management and offloading    Is patient s nutritional status compromised? yes   a. If yes, what interventions are in place? Protein supplements    Reason for initiating vac therapy? Presence of co-morbidities, High risk of infections, Need for accelerated granulation tissue and Prior history of delayed wound healing    Which?of?the?following?co-morbidities?apply? Immobility  a. If diabetic is patient on a diabetic management program? N/A     Is osteomyelitis present in wound? Y  a.  If yes what treatments are in place? N/A    Number of foam pieces removed from a wound (excluding foam for bridge) : 4 black foam to right hip (1 extra to fill the depth at the center of the wound  Verified this matched the number of foam pieces applied last dressing change: no    Number of foam pieces packed into wound (excluding foam for bridge) :   To sacral wound: 1 black foam and ring along distal edge to prevent intrusion of stool, vashe soak x 10 minutes     _____________________________________________________________________________________________________________________________________________________________________    Pressure Injury Location: Bilateral heels      2/20 L heel                                                         2/27 2/20 R heel                                                         2/27     Wound type: Pressure Injury     Pressure Injury Stage: Unstageable, present on admission      Wound history/plan of care:   See above  Wound base: 100 % eschar     Palpation of the wound bed: firm      Drainage: none     Description of drainage: none     Measurements (length x width x depth, in cm)   L heel: 4  x 5.5 cm    R heel: 2  x 2 cm      Tunneling N/A     Undermining N/A  Periwound skin: Intact and Scar tissue      Color: pink      Temperature: normal   Odor:  "none  Pain: absent, none  Pain intervention prior to dressing change: slow and gentle cares   Treatment goal: Maintain (prevention of deterioration) and offload pressure, reduce friction and shear  STATUS: stable   Supplies ordered: supplies stored on unit - no change in care needed    3/3 All wounds assessed and redressed, no major changes in clinical picture. Daughter reported that patient started drinking glucerna and ankit for wound healing. Daughter took pictures of wounds during vac change.         Treatment Plan:     Negative pressure wound therapy plan:  Wound location: Sacrum + R Trocanter    Change Days: Mon/Wed/Fri by C RN    Supplies (including all accessories) used: medium Black foam , Adapt barrier ring and Cavilon no sting barrier film  Cleanse with Vashe prior to replacing VAC    Suction setting: -125   Methods used: Window paned all periwound skin with vac drape prior to applying sponge and barrier rings around entire sacral wound to protect from stool     Staff RN to assess integrity of dressing and ensure suction is set at appropriate level every shift.   Date canister. Chart canister output every shift. Change cannister weekly and PRN if full/occluded.    Remove foam dressing and replace with BID normal saline moist gauze dressing if:   -a dressing failure which cannot be repaired within 2 hours   -patient is discharging to home without a home pump   -patient is discharging to a facility outside the local area   -if a dressing is a \"Silver Foam\", remove before Radiation Therapy or MRI     The hospital VAC pump is not to be discharged with the patient.?Ensure to disconnect patient from machine prior to discharge. Then,    - If a home KCI VAC pump has been delivered, connect home cannister to dressing tubing then connect cannister to home pump and turn on machine    - If transferring to a nearby facility with a KCI vac, can disconnect and clamp tubing then cover end with glove so can be " reconnected within 2 hours      Heels  1. Paint with betadine daily, allow to dry  2. Offload heels at all times while in bed, utilize prevlon boots    RECOMMEND PRIMARY TEAM ORDER: None, at this time  Education provided: importance of repositioning, wound progress, Infection prevention , Moisture management and Off-loading pressure  Discussed plan of care with: Patient, Family, Nurse and Physician  WO nurse follow-up plan: Monday/WednesdayFriday  Notify WOC if wound(s) deteriorate.  Nursing to notify the Provider(s) and re-consult the WOC Nurse if new skin concern.    DATA:     Current support surface: Standard  Standard gel/foam mattress (IsoFlex, Atmos air, etc)  Containment of urine/stool: Incontinence Protocol  BMI: Body mass index is 26.48 kg/m .   Active diet order: Orders Placed This Encounter      Regular Diet Adult     Output: I/O last 3 completed shifts:  In: 1457 [P.O.:240; I.V.:1217]  Out: 1400 [Urine:1400]     Labs:   Recent Labs   Lab 03/03/23  0645   ALBUMIN 1.4*   HGB 8.4*   WBC 5.0     Pressure injury risk assessment:   Sensory Perception: 2-->very limited  Moisture: 3-->occasionally moist  Activity: 1-->bedfast  Mobility: 2-->very limited  Nutrition: 1-->very poor  Friction and Shear: 1-->problem  Lalit Score: 10    MUNIRA AMBRIZ RN CWOCN  Pager no longer is use, please contact through [employer]   [facility]: WO Nurse

## 2023-03-06 NOTE — DISCHARGE INSTRUCTIONS
"Negative pressure wound therapy plan:  Wound location: Sacrum + R Trocanter    Change Days: Mon/Wed/Fri by [facility] RN    Supplies (including all accessories) used: medium Black foam , Adapt barrier ring and Cavilon no sting barrier film  Cleanse with Vashe prior to replacing VAC    Suction setting: -125   Methods used: Window paned all periwound skin with vac drape prior to applying sponge and barrier rings around entire sacral wound to protect from stool      Staff RN to assess integrity of dressing and ensure suction is set at appropriate level every shift.   Date canister. Chart canister output every shift. Change cannister weekly and PRN if full/occluded.     Remove foam dressing and replace with BID normal saline moist gauze dressing if:   -a dressing failure which cannot be repaired within 2 hours   -patient is discharging to home without a home pump   -patient is discharging to a facility outside the local area   -if a dressing is a \"Silver Foam\", remove before Radiation Therapy or MRI      The hospital VAC pump is not to be discharged with the patient.?Ensure to disconnect patient from machine prior to discharge. Then,    - If a home KCI VAC pump has been delivered, connect home cannister to dressing tubing then connect cannister to home pump and turn on machine    - If transferring to a nearby facility with a KCI vac, can disconnect and clamp tubing then cover end with glove so can be reconnected within 2 hours       Heels  1. Paint with betadine daily, allow to dry  2. Offload heels at all times while in bed, utilize prevlon or rooke boots  "

## 2023-03-07 NOTE — CONSULTS
Clinical Nutrition Brief Notes    Nutrition Consult:  Per provider's order - Tube feeding consult    Calorie count:    3/5  Noon time: refused to eat per RN  Dinner time: 3 pieces of bacons; declined food brought from daughter  Total: 165 kcal, 7.84 g protein    3/6  Morning: few sips of orange juice   Lunch time; 3 pieces of deleon, several bites of egg (50% per menu ticket), several bites of applesauce and 100% orange juice per RN's note  Total of ~ 300 kcal, 14 g    Comments: had some solid food for the past 2 days (comparing to previous times; however, intake continue to be far from meeting the basic/increased nutrition needs. RD team would continue to recommend TF if appropriate.     Tube Feeding Plan:  If pt precedes FT, recommend TF with following regimen:  Promote w/ fiber @ 10 mL/hr. Once okay to advance OR K+/= 3, Mag ++ >/= 1.5, and phos >/+ 1.9, advance by 10 mL q 8 hrs as tolerated to eventually goal rate of 75 mL/hr.   - 30 mL q 4hrs fluid flushes for tube patency. Fluid restriction or addition fluids adjustments per MD.      - Given wound and suspected long term poor oral intake PTA & minimal oral intake during the admit, recommend 2 packs of ankit and multivitamin to feeds daily.      Monitor ability to advance to goal EN support,  Promote w/ fiber @ goal of 75 mL/hr (1800 mL/hr), will provides 1960 kcal (27.4 kcal/kg), 117 g protein (1.78 g pro/kg), 1496 mL free water, 248 g CHO, and 25 g fiber daily.       - Given CKD past medical Hx, monitoring renal lab      Plan:   - Recommend phosphorous replacement due to pt's minimal p.o. intake PTA and during the admission.  - Consider 250 mg vitamin C daily (given CKD medical history; renal lab looks okay during the admission) for wound healing and repletion.       Ira Kingston (Amber), Dietetic Intern

## 2023-03-07 NOTE — PLAN OF CARE
Problem: Diabetes Comorbidity  Goal: Blood Glucose Level Within Targeted Range  Outcome: Progressing     Problem: Violence Risk or Actual  Goal: Anger and Impulse Control  Outcome: Progressing  Intervention: Minimize Safety Risk  Recent Flowsheet Documentation  Taken 3/6/2023 2205 by Emily Gutiérrez RN  Sensory Stimulation Regulation: television on  Enhanced Safety Measures: bed alarm set     Problem: Oral Intake Inadequate  Goal: Improved Oral Intake  Outcome: Progressing     Problem: Plan of Care - These are the overarching goals to be used throughout the patient stay.    Goal: Absence of Hospital-Acquired Illness or Injury  Intervention: Identify and Manage Fall Risk  Recent Flowsheet Documentation  Taken 3/6/2023 2205 by Emily Gutiérrez RN  Safety Promotion/Fall Prevention: bed alarm on     Problem: Risk for Delirium  Goal: Improved Behavioral Control  Intervention: Minimize Safety Risk  Recent Flowsheet Documentation  Taken 3/6/2023 2205 by Emily Gutiérrez RN  Enhanced Safety Measures: bed alarm set  Goal: Improved Attention and Thought Clarity  Intervention: Maximize Cognitive Function  Recent Flowsheet Documentation  Taken 3/6/2023 2205 by Emily Gutiérrez RN  Sensory Stimulation Regulation: television on  Reorientation Measures:   clock in view   familiar social contact encouraged     Problem: Pain Acute  Goal: Optimal Pain Control and Function  Intervention: Prevent or Manage Pain  Recent Flowsheet Documentation  Taken 3/6/2023 2205 by Emily Gutiérrez RN  Sensory Stimulation Regulation: television on  Medication Review/Management: medications reviewed     Problem: Hypertension Acute  Goal: Blood Pressure Within Desired Range  Intervention: Normalize Blood Pressure  Recent Flowsheet Documentation  Taken 3/6/2023 2205 by Emily Gutiérrez RN  Sensory Stimulation Regulation: television on  Medication Review/Management: medications reviewed     Problem: Wound Healing Progression  Goal: Optimal Wound  Healing  Intervention: Promote Wound Healing  Recent Flowsheet Documentation  Taken 3/6/2023 2205 by Emily Gutiérrez, RN  Activity Management:   activity encouraged   bedrest   Goal Outcome Evaluation:  Patient slept most the shift, complained of pain only when turned right side. Q2 turns, purewick in place, incont of bowel and bladder. Nurse encouraged patient fluid intake. Blood sugar within normal for bedtime.

## 2023-03-07 NOTE — PROGRESS NOTES
"SPIRITUAL HEALTH SERVICES Progress Note  [facility]     Saw pt Cristal Preciado per Palliative Team      Patient/Family Understanding of Illness and Goals of Care - Did not discuss with pt.      Distress and Loss - Length of stay. Failure to thrive.      Strengths, Coping, and Resources - not discussed      Meaning, Beliefs, and Spirituality  - Pt is Sabianism. When asked whether she would like a prayer she said yes. What would you like to pray for? \"Healing.\" I initiated prayer. We prayed the Lord's Prayer aloud together.      Plan of Care - Spiritual Care is available as needed or requested.    Maria L Lee    Palliative Care Team    Office: [phone] (for non-urgent requests)  Please Vocera or page through [facility] for time-sensitive requests  "

## 2023-03-07 NOTE — PROGRESS NOTES
[facility] Hospitalist Progress Note  [facility]  Admission date: 2/16/2023    Summary:    72F h/o bedbound nursing home resident, sacral decubitus ulcer (present on admission), CVA, dementia, neuropathy, DM2, CKD, CAD, failure to thrive admitted on 2/16/2023 with infected new sacral decubitus ulcer with possible necrotizing fasciitis.  Underwent I&D (2/17) with necrotizing tissue down to the sacrum and therefore with osteomyelitis.      Course has been complicated by malnutrition, limited PO, and family discord.    Assessment/Plan    #Severe sepsis secondary to infected stage IV sacral and right greater trochanter decubitus ulcers with sacral osteomyelitis  -zosyn x 4-6 weeks from source control (2/17).  PICC placed  -VAC    #Moderate malnutrition, weight loss, failure to thrive  -review of prior notes regarding EGD, NJ noted  -calorie counts ongoing.  Encouraged supplements  -NJ is short term, if needs alternative feeding will end up needing G-tube.   If calorie counts are unacceptable and patient still agreeable would ask GI about EGD and G-tube vs. IR  Placement.    #Type II DM : HgbA1c on 1/3/2023 was 7.6 % and multiple hypoglycemic episodes in setting of limited PO  Home regimen: lantus 20 units QAM and Novolog sliding scale. PTA Victoza and metformin on hold.  Inpatient:  Lantus to 2 units, sliding scale insulin     #HTN: metoprolol.     #History of CAD:  Metoprolol, lipitor, asa      #Goal of care:   -reviewed prior note from Dr. Miranda outlining issues.  -some family discord regarding feeding tubes.  -On my visits 03/06 and 03/07 Ms. Preciado was agreeable to alterative feeding and again nominates Court alone as her health care surrogate.  As this is her consistent wish and I believe has capacity to make such a decision an honoring choices should be filled out to this effect.  Dicussed with SW.      -Acute blood loss anemia: Hgb was 5.7 after surgery. Transfused 2 units of packed RBCs  -  "Continue to monitor and transfuse to keep Hgb > 7. Hemoglobin has been stable.   -Hyponatremia, hypokalemia, hypomag  -hypercalcemia - mild on admission and probably from immobility vs. dehydration.  resolved  -Pressure ulcer on bilateral heels, unstageable: Present on admission. L heel: 4  x 5.5 cm  and R heel: 2  x 2 cm, the skin surface is black.  - Wound care consulted. On offload boots.    -History of ulcer, GERD: on PPI.   -Hyperlipidemia: statin.         Checklist:  Code Status: Full Code    Diet: Snacks/Supplements Adult: Magic Cup; With Meals  Regular Diet Adult     DVT px:  Enoxaparin (Lovenox) SQ    Disposition and Discharge Planning  Auto-populated from discharge tab:     Expected Discharge Date: 03/07/2023    Discharge Delays: Placement - LTC  IV Medication - consider oral or Home Infusion  Procedure Pending (enter procedure & time in comments)  Destination: long-term care facility  Discharge Comments: needs tube feeding, EGD 3/1 in OR  wants new LTC  care conference         System Identified Risk Variables  Auto-populated based on system request - if relevant they will be addressed above:  Clinically Significant Risk Factors            # Hypomagnesemia: Lowest Mg = 1.5 mg/dL in last 2 days, will replace as needed   # Hypoalbuminemia: Lowest albumin = 0.9 g/dL at 2/23/2023  7:22 AM, will monitor as appropriate          # DMII: A1C = 7.6 % (Ref range: <5.7 %) within past 6 months   # Overweight: Estimated body mass index is 26.48 kg/m  as calculated from the following:    Height as of 2/9/23: 1.702 m (5' 7\").    Weight as of this encounter: 76.7 kg (169 lb 1.5 oz).   # Moderate Malnutrition: based on nutrition assessment            Interval Events/Subjective/Notable results:    BMP reviewed  My first first visit with Ms. Preciado.  She is alert, interactive and reports unprompted she is ready for a feeding tube via the nose.    Ms. Preciado was clear that she would want Court to be her decision maker " if she is unable to make decisions.  Started to discuss that PEG would really be needed for malnutrition as NJ is quite short term.  Encouraged PO intake especially supplements      Objective    Vital signs in last 24 hours  Temp:  [98.1  F (36.7  C)-98.8  F (37.1  C)] 98.1  F (36.7  C)  Pulse:  [62-77] 62  Resp:  [17-18] 17  BP: (119-141)/(53-65) 130/53  SpO2:  [96 %-98 %] 96 % O2 Device: None (Room air)    Weight:   169 lbs 1.49 oz  Body mass index is 26.48 kg/m .    Intake/Output last 3 shifts  I/O last 3 completed shifts:  In: 685 [P.O.:120; I.V.:565]  Out: 650 [Urine:650]    Physical Exam  General:  Alert, cooperative, no distress, frail  Neurologic:  oriented, facial symmetry preserved, fluent speech.   Psych: calm  HEENT:  Anicteric, MMM  CV: RRR no MRG, normal S1 and S2, no pitting edema  Lungs: CTAB.  Easyrespirations  Abd: soft, NT  Skin: no rashes or jaundice noted on exposed skin.    Cornejo Catheter: Not present  Lines: PRESENT      PICC 02/17/23 Triple Lumen Right Basilic Vascular access-Site Assessment: WDL         Medical Decision Making     Attempt to call Court, no answer or voicemail.        Jeni Gonzalez MD, [facility]  Internal Medicine Hospitalist

## 2023-03-07 NOTE — PLAN OF CARE
Problem: Malnutrition  Goal: Improved Nutritional Intake  Outcome: Not Progressing   Goal Outcome Evaluation:       Patient ate 5-6 pieces of deleon and drank about 120cc of water.  Took a few bites of applesauce with medication.  Order placed for NG feeding tube.  Called radiology and they stated they can't place tube until tomorrow.  Will continue to monitor and encourage PO intake as able.

## 2023-03-07 NOTE — PLAN OF CARE
"Goal Outcome Evaluation:      Plan of Care Reviewed With: patient    Overall Patient Progress: no changeOverall Patient Progress: no change    Outcome Evaluation: has been turned from side to side. did eat several slices of deleon for evening meal, drank some orange juice. offered supplement, patient reports she will drink it \"but not now\". I offered several times, she just kept telling me \"I'll drink it later\"    Problem: Plan of Care - These are the overarching goals to be used throughout the patient stay.    Goal: Absence of Hospital-Acquired Illness or Injury  Intervention: Prevent Skin Injury  Recent Flowsheet Documentation  Taken 3/6/2023 0807 by Yolanda Ambrosio, RN  Body Position: right   Wound vac dressing was changed earlier in the day, is functioning well. Bilateral rooke boots in place lower extremities.    Problem: Risk for Delirium  Goal: Improved Attention and Thought Clarity  3/6/2023 1923 by Yolanda Ambrosio, RN  Outcome: Progressing  3/6/2023 1434 by Yolanda Ambrosio RN  Outcome: Progressing   Is alert, calm and pleasant. Answered all questions appropriately.     Problem: Pain Acute  Goal: Optimal Pain Control and Function  3/6/2023 1923 by Yolanda Ambrosio, RN  Outcome: Progressing  3/6/2023 1434 by Yolanda Ambrosio RN  Outcome: Progressing   Denies pain    Problem: Electrolyte Imbalance  Goal: Electrolyte Imbalance: Plan of Care  3/6/2023 1923 by Yolanda Ambrosio, RN  Outcome: Progressing  3/6/2023 1434 by Yolanda Ambrosio RN  Outcome: Progressing   Continue with IV fluids as ordered.    Problem: Diabetes Comorbidity  Goal: Blood Glucose Level Within Targeted Range  3/6/2023 1923 by Yolanda Ambrosio, RN  Outcome: Progressing  3/6/2023 1434 by Yolanda Ambrosio, RN  Outcome: Progressing   Continue to monitor blood sugar levels.    Problem: Oral Intake Inadequate  Goal: Improved Oral Intake  3/6/2023 1923 by Yolanda Ambrosio, MOS[facility]  Outcome: " Progressing  3/6/2023 1434 by Yolanda Ambrosio, RN  Outcome: Progressing   Will continue to offer liquids to drink. Did take in orange juice a few different times this shift, just sips at a time.

## 2023-03-07 NOTE — PROGRESS NOTES
[facility] Hospitalist Progress Note  [facility]  Admission date: 2/16/2023    Summary:    72F h/o bedbound nursing home resident, sacral decubitus ulcer (present on admission), CVA, dementia, neuropathy, DM2, CKD, CAD, failure to thrive admitted on 2/16/2023 with infected new sacral decubitus ulcer with possible necrotizing fasciitis.  Underwent I&D (2/17) with necrotizing tissue down to the sacrum and therefore with osteomyelitis.      Course has been complicated by malnutrition, limited PO, and family discord.    Assessment/Plan    #Severe sepsis secondary to infected stage IV sacral and right greater trochanter decubitus ulcers with sacral osteomyelitis  -zosyn x 4-6 weeks from source control (2/17).  PICC placed  -VAC    #Moderate malnutrition, weight loss, failure to thrive  -review of prior notes regarding EGD, alternative feeds.  Agrees to NG for now, in discussion with Court Ms. Preciado has had a bad experience with G-tube in family and would like to start with NG.  -NG ordered, TF to start.    -Reviewed that NG is short term.  If needs alternative feeding for longer period, which I anticipate will be the case, Ms. Preciado will need an NG.    -Continue to monitor PO intake.      #Type II DM : HgbA1c on 1/3/2023 was 7.6 % and multiple hypoglycemic episodes in setting of limited PO  Home regimen: lantus 20 units QAM and Novolog sliding scale. PTA Victoza and metformin on hold.  Inpatient:  Lantus to 2 units, sliding scale insulin     #HTN: metoprolol.     #History of CAD:  Metoprolol, lipitor, asa      #Goal of care:   -reviewed prior note from Dr. Miranda outlining issues.  -some family discord regarding feeding tubes.  -In my visit 03/06 Ms. Preciado was agreeable to alterative feeding if needed.  She also nominated Court as her health care surrogate if needed.  If this is her consistent wish we should have Ms. Preciado sign paperwork to that effect.  I believe Ms. Preciado has capacity  "to nominate her daughter as her health care agent.      -Acute blood loss anemia: Hgb was 5.7 after surgery. Transfused 2 units of packed RBCs  - Continue to monitor and transfuse to keep Hgb > 7. Hemoglobin has been stable.   -Hyponatremia, hypokalemia, hypomag  -hypercalcemia - mild on admission and probably from immobility vs. dehydration.  resolved  -Pressure ulcer on bilateral heels, unstageable: Present on admission. L heel: 4  x 5.5 cm  and R heel: 2  x 2 cm, the skin surface is black.  - Wound care consulted. On offload boots.    -History of ulcer, GERD: on PPI.   -Hyperlipidemia: statin.         Checklist:  Code Status: Full Code    Diet: Snacks/Supplements Adult: Magic Cup; With Meals  Regular Diet Adult     DVT px:  Enoxaparin (Lovenox) SQ    Disposition and Discharge Planning  Auto-populated from discharge tab:     Expected Discharge Date: 03/07/2023    Discharge Delays: Placement - LTC  IV Medication - consider oral or Home Infusion  Procedure Pending (enter procedure & time in comments)  Destination: long-term care facility  Discharge Comments: needs tube feeding, EGD 3/1 in OR  wants new LTC  care conference         System Identified Risk Variables  Auto-populated based on system request - if relevant they will be addressed above:  Clinically Significant Risk Factors            # Hypomagnesemia: Lowest Mg = 1.5 mg/dL in last 2 days, will replace as needed   # Hypoalbuminemia: Lowest albumin = 0.9 g/dL at 2/23/2023  7:22 AM, will monitor as appropriate          # DMII: A1C = 7.6 % (Ref range: <5.7 %) within past 6 months   # Overweight: Estimated body mass index is 26.48 kg/m  as calculated from the following:    Height as of 2/9/23: 1.702 m (5' 7\").    Weight as of this encounter: 76.7 kg (169 lb 1.5 oz).   # Moderate Malnutrition: based on nutrition assessment            Interval Events/Subjective/Notable results:    Consistent that would want Court as health care surrogate.  Asked SW to get " paperwork signed  Ok with feeding tube.  NG.  Discussed tempororary solution and G-tube may be needed  Eating more but mostly just deleon.  D/w RN, SW, pt's daughter    Objective    Vital signs in last 24 hours  Temp:  [98  F (36.7  C)-98.8  F (37.1  C)] 98.7  F (37.1  C)  Pulse:  [62-67] 66  Resp:  [17-18] 18  BP: (130-148)/(53-67) 134/63  SpO2:  [96 %-100 %] 100 % O2 Device: None (Room air)    Weight:   169 lbs 1.49 oz  Body mass index is 26.48 kg/m .    Intake/Output last 3 shifts  I/O last 3 completed shifts:  In: 685 [P.O.:120; I.V.:565]  Out: 650 [Urine:650]    Physical Exam  General:  Alert, cooperative, no distress, frail  Neurologic:  oriented, facial symmetry preserved, fluent speech.   Psych: calm  HEENT:  Anicteric, MMM  CV: RRR no MRG, normal S1 and S2, no pitting edema  Lungs: CTAB.  Easyrespirations  Abd: soft, NT  Skin: no rashes or jaundice noted on exposed skin.    Cornejo Catheter: Not present  Lines: PRESENT      PICC 02/17/23 Triple Lumen Right Basilic Vascular access-Site Assessment: WDL         Medical Decision Making           >50min    Jeni Gonzalez MD, [facility]  Internal Medicine Hospitalist

## 2023-03-07 NOTE — PROGRESS NOTES
"Discharge planning assist    Length of Stay (days): 19    Expected Discharge Date:  To be determined.      Concerns to be Addressed:  May need to find new long term care. A nasogastric feeding tube would be a barrier.     Anticipated Discharge Disposition: Long term care.  Anticipated Discharge Services: Total care.  Anticipated Discharge DME: Wound V.A.C.; potential feeding tube;     Referrals Placed by CM/SW: See below   Private pay costs discussed: See previous care management notes.     Additional Information:  Review for LOS. Patient is now day 18; she had been a resident of [facility] and her bed had been held there but family did not wish for her to return there at discharge. Left a message for admissions at [facility] to confirm whether or not patient has a bed hold.   10:34 AM:  Per admissions at [facility], patient's 18 day bed hold has . They will consider continuing hold if family is willing to private-pay. Will escalate to discharge planning team.   11:24 AM:  Per RD, oral intake is inadequate and not even meeting patient's basic needs. They do have a tube feeding plan in place if family (and patient) agree with this plan. Update provided to assigned SW CM.   1:19 PM:  Reviewed with attending MD. He states patient is very clear that she only wants her daughter Court involved in decision making. CM will approach patient to complete [employer] (may need help with this). Also, the plan for now is for an NG tube to be placed with a decision to be made regarding long term feeding but to be determined on her tolerance of tube feeding and signs of improvement with additional nutrition. Will review with assigned SW CM.     Additional considerations:  Per Palliative care notes of 3/3/23, \"(considering) short term feeding tube (per colleague, patient had been very adamant that she did not want this). Daughter Valerie wants her to get one however (worries about low " "caloric intake impacting mentation, and she feels she did talk with pt about her goals and she wanted to get healthier, leave the hospital and eventually move back to [place]).     Consulted Ethics ~145 PM for advice on how best to navigate this challenging health care decision making situation. Spoke with Ethics [phone].  Called both daughters late this afternoon (400 PM) to offer a prompt facilitated goals of care meeting with both dtrs and Palliative Care and Ethics to sort through some of the above, before the week-end. Daughter Shantelle indicated this was not a good time. Daughter Valerie was available. To both, advised they stay in good contact with the hospital/medical staff this week-end and that important decisions may need to be made soon. To both dtrs, emphasized pt wanted them both involved; they are aware they are each valued surrogate decision makers. Emphasized they may in fact have similar goals for patient, and may be able to have some alignment, with goals, just not be involved/present at the same time.      Ethics advice going forward for the week-end/Hospitalists (as Palliative Care is not involved over the week-end):  1. Involve both daughters in important medical decisions (call dtrs separately). Discuss medical decision at hand with each. Ideally, there is agreement on the decision.   2. If there is not agreement, please call Ethics consult pager [phone] to get assistance in resolving the issue.\"    Libertad Worrell RN    "

## 2023-03-08 NOTE — PROGRESS NOTES
[facility]  Palliative Care Chart Check Note    Palliative medicine was consulted for goals of care.  These are established, and after much discussion and discernment surrogate decisionmaker has been confirmed and Cristal is willing to proceed with NJ tube which will be placed today.  Our team had discussed that return to SNF would require PEG tube placement for Cristal.  - Palliative will sign off at this time.  - if there are new changes clinically for which further goals of care conversations would be helpful, please do not hesitate to reconsult and/or contact our service at pager number below.       Goals of care:    Advanced care planning: HCD now completed (appreciate support from SW) and affirms HCA is daughter Court.  On file in record.    Support: two sons, three daughters (one, April, is in LTC).    Symptoms: Palliative is not managing symptoms.    Michelle Fontanez MD  [facility] Palliative Medicine Service: [facility]  Department voice mail (checked M-F 8a-4pm approx): [phone]  [facility] Palliative Medicine pager: [phone]  (Please use AMION for text paging if possible, use link under Palliative service)  Or may securely message me via Vocera Web Console

## 2023-03-08 NOTE — CONSULTS
Health Care Directive completed yesterday 3/7 and placed in hard chart. [facility] sent notarized copy to [employer] for review and input into Epic.

## 2023-03-08 NOTE — PROGRESS NOTES
Clinical Nutrition Services - Calorie Counts:    Calorie Counts:    3/7: - no ticket found  5-6 pieces of deleon and few bites with apple sauce with medication per RN's note, which continues to not meet the pt's nutrition needs.    Comments:   Per 's note, pt and pt's family are in agreement of FT placement. Pt was heading to floor for tube placement (NG per physician's note) when writer was on the floor earlier.     Updates: NG tube placement confirmed per XR feeding tube placement order + physician's note.     Ira (Mena) Thee, Dietetic Intern

## 2023-03-08 NOTE — PLAN OF CARE
Problem: Plan of Care - These are the overarching goals to be used throughout the patient stay.    Goal: Plan of Care Review  Description: The Plan of Care Review/Shift note should be completed every shift.  The Outcome Evaluation is a brief statement about your assessment that the patient is improving, declining, or no change.  This information will be displayed automatically on your shift note.  Outcome: Progressing   Goal Outcome Evaluation:  Reviewed plan of care with patient who is somewhat disoriented to time and situation, She eats poorly-ate 50% of breakfast-did feed herself, and only a few bites of a magic cup for lunch. She went down at 1055 for a Ngtube for enteral feeding, Tube feeding was started at 10ml/hr at 1200.Labs were unable to be obtained from PICC line they were drawn by lab in the afternoon. She had loose brown stools x3-Mg was low and a 4mg IV bump was hung at 1500. Phos was 1.4 and phos protocol was started also. WOC RN changed wound vac. There was a small amt of yellowish drainageincanniter. Pt was medicated in am and  pm with oxycodone 2.5mg. She took her pills with applesauce.

## 2023-03-08 NOTE — PLAN OF CARE
Problem: Pain Acute  Goal: Optimal Pain Control and Function  Outcome: Progressing  Intervention: Prevent or Manage Pain  Recent Flowsheet Documentation  Taken 3/7/2023 2339 by Emily Gutiérrez RN  Sensory Stimulation Regulation: television on  Medication Review/Management: medications reviewed     Problem: Diabetes Comorbidity  Goal: Blood Glucose Level Within Targeted Range  Outcome: Progressing     Problem: Violence Risk or Actual  Goal: Anger and Impulse Control  Outcome: Progressing  Intervention: Minimize Safety Risk  Recent Flowsheet Documentation  Taken 3/7/2023 2339 by Emily Gutiérrez RN  Sensory Stimulation Regulation: television on  Enhanced Safety Measures: bed alarm set     Problem: Plan of Care - These are the overarching goals to be used throughout the patient stay.    Goal: Absence of Hospital-Acquired Illness or Injury  Intervention: Identify and Manage Fall Risk  Recent Flowsheet Documentation  Taken 3/7/2023 2339 by Emily Gutiérrez RN  Safety Promotion/Fall Prevention: bed alarm on     Problem: Risk for Delirium  Goal: Improved Behavioral Control  Intervention: Minimize Safety Risk  Recent Flowsheet Documentation  Taken 3/7/2023 2339 by Emily Gutiérrez RN  Enhanced Safety Measures: bed alarm set  Goal: Improved Attention and Thought Clarity  Intervention: Maximize Cognitive Function  Recent Flowsheet Documentation  Taken 3/7/2023 2339 by Emily Gutiérrez RN  Sensory Stimulation Regulation: television on  Reorientation Measures:    clock in view    familiar social contact encouraged     Problem: Hypertension Acute  Goal: Blood Pressure Within Desired Range  Intervention: Normalize Blood Pressure  Recent Flowsheet Documentation  Taken 3/7/2023 2339 by Emily Gutiérrez RN  Sensory Stimulation Regulation: television on  Medication Review/Management: medications reviewed   Goal Outcome Evaluation:  Patient slept most the night, denies pain. Q2 turns,wound vac in place. External catheter intact. Takes pills in  applesauce,       Zosyn and fluids infusing.

## 2023-03-08 NOTE — PROGRESS NOTES
Care Management Follow Up    Length of Stay (days): 19    Expected Discharge Date: 2023     Concerns to be Addressed: discharge planning       Patient plan of care discussed at interdisciplinary rounds: Yes    Anticipated Discharge Disposition: Long Term Care     Anticipated Discharge Services: Other (see comment) (nursing care services, assistance with ADLs, care and supervision)    Anticipated Discharge DME: None    Patient/family educated on Medicare website which has current facility and service quality ratings: yes    Education Provided on the Discharge Plan:  Yes    Patient/Family in Agreement with the Plan: yes    Referrals Placed by CM/SW: Post Acute Facilities    Private pay costs discussed: Not applicable    Additional Information: SW received return message from Trevin at [facility] - pt's MA bed hold  yesterday.  She is waiting to hear from family as to whether they want to private pay for them to continue to hold bed.    SW spoke with pt's daughter Court regarding discharge planning.  Court stated that she was expecting to hear from doctor yesterday after feeding tube was placed.  She stated that pt, herself, and other family members in agreement with placement of feeding tube.  She stated that pt has limited income and so cannot afford to pay privately to keep bed at [facility].  Discussed that referral to [facility] still pending.  Discussed that SW would need additional referral choices.  Court is okay with SW sending to other facilities as near to [place] as possible (where sister April resides).  Court mentioned that her brother was in the ICU at [facility] for a week now due to a medication reaction.  She will not be to hospital to see pt until around 6 PM this evening.      Per Dr Gonzalez, pt has indicated that she only wants daughter Court as her health care agent.  SW to do new HCD with pt and have notarized.  SW met with pt and  Detail Level: Detailed completed HCD form.  ALEJANDRA Muir came to pt's room to notarize pt's signature and make copies for chart and for pt.        DAPHNEY Stevenson, LGSW 03/07/23 8:11 PM

## 2023-03-08 NOTE — PROGRESS NOTES
[facility] Hospitalist Progress Note  [facility]  Admission date: 2/16/2023    Summary:    72F h/o bedbound nursing home resident, sacral decubitus ulcer (present on admission), CVA, dementia, neuropathy, DM2, CKD, CAD, failure to thrive admitted on 2/16/2023 with infected new sacral decubitus ulcer with possible necrotizing fasciitis.  Underwent I&D (2/17) with necrotizing tissue down to the sacrum and therefore with osteomyelitis.      Course has been complicated by malnutrition, limited PO, and family discord.    Assessment/Plan    #Severe sepsis secondary to infected stage IV sacral and right greater trochanter decubitus ulcers with sacral osteomyelitis  -zosyn x 4-6 weeks from source control (2/17).  PICC placed  -VAC    #Moderate malnutrition, weight loss, failure to thrive  -review of prior notes regarding EGD, alternative feeds.  Agrees to NG for now, in discussion with Court Ms. Preciado has had a bad experience with G-tube in family and would like to start with NG.  Reviewed that NG is short term.  If needs alternative feeding for longer period Ms. Preciado will need an NG.    -NG today and start TF.    -Continue to monitor PO intake.      #Type II DM : HgbA1c on 1/3/2023 was 7.6 % and multiple hypoglycemic episodes in setting of limited PO  -Home regimen: lantus 20 units QAM and Novolog sliding scale. PTA Victoza and metformin on hold.  -Inpatient:  Lantus to 5units, sliding scale insulin.  Adjust with initiation of tube feeds     #HTN: metoprolol.     #History of CAD:  Metoprolol, lipitor, asa      #Goal of care:   -reviewed prior note from Dr. Miranda outlining issues.  -Honoring choices filled out nominating daughter Court as surrogate if needed for decisions.    -Acute blood loss anemia: Hgb was 5.7 after surgery. Transfused 2 units of packed RBCs.  Hemoglobin has been stable.   -Hyponatremia, hypokalemia, hypomag  -hypercalcemia - mild on admission and probably from immobility vs.  "dehydration.  resolved  -Pressure ulcer on bilateral heels, unstageable: Present on admission. L heel: 4  x 5.5 cm  and R heel: 2  x 2 cm, the skin surface is black.  - Wound care consulted. On offload boots.    -History of ulcer, GERD: on PPI.   -Hyperlipidemia: statin.         Checklist:  Code Status: Full Code    Diet: Snacks/Supplements Adult: Magic Cup; With Meals  Regular Diet Adult  Adult Formula Drip Feeding: Continuous Promote w fiber; Nasojejunal; Goal Rate: 75; mL/hr; When TF placement confirmed, start at 10 mL/hr and increase by 10 mL every 8 hours, as tolerated, to goal rate of 75 mL/hr     DVT px:  Enoxaparin (Lovenox) SQ    Disposition and Discharge Planning  Auto-populated from discharge tab:     Expected Discharge Date: 03/13/2023    Discharge Delays: Placement - LTC  Destination: long-term care facility  Discharge Comments: needs tube feeding, EGD 3/1 in OR  wants new LTC  care conference         System Identified Risk Variables  Auto-populated based on system request - if relevant they will be addressed above:  Clinically Significant Risk Factors            # Hypomagnesemia: Lowest Mg = 1.6 mg/dL in last 2 days, will replace as needed   # Hypoalbuminemia: Lowest albumin = 0.9 g/dL at 2/23/2023  7:22 AM, will monitor as appropriate          # DMII: A1C = 7.6 % (Ref range: <5.7 %) within past 6 months   # Overweight: Estimated body mass index is 26.48 kg/m  as calculated from the following:    Height as of 2/9/23: 1.702 m (5' 7\").    Weight as of this encounter: 76.7 kg (169 lb 1.5 oz).   # Moderate Malnutrition: based on nutrition assessment            Interval Events/Subjective/Notable results:    BMP and BGs ok.  No complaints.  Ready for NG    Objective    Vital signs in last 24 hours  Temp:  [97.4  F (36.3  C)-98.7  F (37.1  C)] 97.9  F (36.6  C)  Pulse:  [61-87] 61  Resp:  [18-20] 18  BP: (127-154)/(61-83) 154/67  SpO2:  [99 %-100 %] 99 % O2 Device: None (Room air)    Weight:   169 lbs 1.49 " oz  Body mass index is 26.48 kg/m .    Intake/Output last 3 shifts  I/O last 3 completed shifts:  In: 120 [P.O.:120]  Out: 150 [Urine:150]    Physical Exam  General:  Alert, cooperative, no distress, frail  Neurologic:  oriented, facial symmetry preserved, fluent speech.   Psych: calm  HEENT:  Anicteric, MMM  CV:  no pitting edema  Lungs: CTAB.  Easyrespirations  Skin: no rashes or jaundice noted on exposed skin.    Cornejo Catheter: Not present  Lines: PRESENT      PICC 02/17/23 Triple Lumen Right Basilic Vascular access-Site Assessment: WDL         Medical Decision Making           D/w RN    Jeni Gonzalez MD, [facility]  Internal Medicine Hospitalist

## 2023-03-08 NOTE — PROGRESS NOTES
Care Management Follow Up    Length of Stay (days): 20    Expected Discharge Date: 2023     Concerns to be Addressed: discharge planning     Patient plan of care discussed at interdisciplinary rounds: Yes    Anticipated Discharge Disposition: Long Term Care     Anticipated Discharge Services: Other (see comment) (nursing care services, assistance with ADLs, care and supervision)  Anticipated Discharge DME: Wound Vac    Patient/family educated on Medicare website which has current facility and service quality ratings: yes  Education Provided on the Discharge Plan:    Patient/Family in Agreement with the Plan: yes    Referrals Placed by CM/SW: Post Acute Facilities  Private pay costs discussed: Not applicable    Additional Information:  RNCM received phone call from [employer] regarding patients wound vac. Will be delivered by them today to patient's room.     Social HX: Patient resides at [facility] where she shares a room with her daughter who is also in LTC.     Patient's family declined for patient to return to [facility]. They did file reports against the facility. Patient was on a bed hold, however that bed hold has now . CM is working on obtaining a new LTC placement for patient. Patient at baseline is a lift transfer, has a wheelchair.      SWCM note yesterday stated that HCD paperwork was completed with only daughter Court to be agent, submitted to [employer] for approval.     Patient has had a NG placed with TF orders entered. Awaiting follow up on tolerance/ plan.     CM will continue to follow care progression and aide in discharge planning as needed.     Usha Jolly RN

## 2023-03-08 NOTE — PLAN OF CARE
Goal Outcome Evaluation:     Pt is A&Ox4, slow to respond to questions. Reported pain where wound is, received prn oxycodone and prn tylenol which was effective. Repositioned every 2-3 hours, wound vac intact and patent. Poor po intake but did have most of supplement ordered. Took all oral medications. No bm this shift, pt incont of urine, purewick placed at HS.  Receiving IV abx. VSS  Problem: Plan of Care - These are the overarching goals to be used throughout the patient stay.    Goal: Optimal Comfort and Wellbeing  Outcome: Progressing     Problem: Pain Acute  Goal: Optimal Pain Control and Function  Outcome: Progressing     Problem: Oral Intake Inadequate  Goal: Improved Oral Intake  Outcome: Progressing     Problem: Wound Healing Progression  Goal: Optimal Wound Healing  Outcome: Progressing  Intervention: Promote Wound Healing  Recent Flowsheet Documentation  Taken 3/7/2023 1750 by Debbie Kaba RN  Activity Management: activity adjusted per tolerance     Problem: Plan of Care - These are the overarching goals to be used throughout the patient stay.    Goal: Absence of Hospital-Acquired Illness or Injury  Intervention: Prevent Skin Injury  Recent Flowsheet Documentation  Taken 3/7/2023 2114 by Debbie Kaba RN  Body Position:   turned   left   position maintained   heels elevated   legs elevated  Taken 3/7/2023 1930 by Debbie Kaba RN  Body Position:   turned   right   position maintained  Taken 3/7/2023 1750 by Debbie Kaba RN  Body Position:   turned   left   legs elevated   heels elevated   position maintained     Problem: Enteral Nutrition  Goal: Absence of Aspiration Signs and Symptoms  Intervention: Minimize Aspiration Risk  Recent Flowsheet Documentation  Taken 3/7/2023 2114 by Debbie Kaba RN  Head of Bed (HOB) Positioning: HOB at 30 degrees  Taken 3/7/2023 1750 by Debbie Kaba RN  Head of Bed (HOB) Positioning: HOB at 20-30 degrees

## 2023-03-08 NOTE — PROGRESS NOTES
[facility] Nurse Inpatient Assessment     Consulted for: Buttocks, heels  3/8 - only Sacrum and R Trocanter wounds addressed in assessment today. Remaining wound assessments kept for continuity.      Patient History (according to provider note(s):      Cristal Preciado is a 72 year old female with PMH significant for known sacral decubitus ulcer (present on admission), CVA, dementia, neuropathy, DM2, CKD, CAD and FTT admitted on 2/16/2023 with infected new sacral decubitus ulcer with possible necrotizing fasciitis.      1. Sepsis- Admit patient. Infected Sacral Decubitus ulcer/Possible Necrotizing fasciits,UTI,Lactic acid= 6.4, WBC= 14.1. CT pelvis report reviewed. ED physician has already been in contact with General Surgeon on call and patient will be seen in consultation. Continue Vancomycin, Zosyn and Clindamycin. Continue IV fluids. Currently NPO. Repeat labs in a.m. Monitor vitals closely. Will make further recommendations based on clinical course.     2. Possible Necrotizing Fasciitis- ED has contacted General Surgeon on call and patient will be seen in consultation. Continue Vancomycin, Zosyn and Clindamycin. Plan per surgeon.    Areas Assessed:      Pressure Injury Location: Sacrum  (additional pictures available in media tab)                                                        2/27, view into the undermining to bottom half of wound       3/6    Wound type: Pressure Injury     Pressure Injury Stage: 4, present on admission   Wound history/plan of care:   Patient admitted with worsening pressure wounds, was taken for an I&D  Wound base: 30 % slough and bone, 70 % dermis, non-granular tissue and adipose tissue, scant granulation starting. Tissue looks more pale today.     Palpation of the wound bed: boggy      Drainage: scant     Description of drainage: serosanguinous     Measurements (length x width x depth, in cm) 10  x 12  x  3.5 cm      Tunneling N/A     Undermining up to  1.5 cm from 8-11 o'clock  Periwound skin: Intact      Color: normal and consistent with surrounding tissue, slight purple discoloration near gluteal cleft      Temperature: normal   Odor: none  Pain: moderate, with palpation and during dressing change  Pain intervention prior to dressing change: slow and gentle cares   Treatment goal: Heal , Increase granulation and Protection  STATUS: evolving  Supplies ordered: gathered    My PI Risk Assessment     Sensory Perception: 2 - Very Limited     Moisture: 1 - Constantly moist     Activity: 1 - Bedfast      Mobility: 2 - Very limited     Nutrition: 2 - Probably inadequate      Friction/Shear: 1 - Problem     TOTAL: 9  __________________________________________________________________________________________________________________  Pressure Injury Location: [facility]         3/3     3/6    Wound type: Pressure Injury     Pressure Injury Stage: 4, present on admission   Wound history/plan of care:   See above  Wound base: 30 % slough and bone, 70 % subcutaneous tissue       Palpation of the wound bed: normal      Drainage: scant     Description of drainage: serosanguinous     Measurements (length x width x depth, in cm) 10  x 13  x  3 cm      Tunneling N/A     Undermining N/A  Periwound skin: Intact      Color: normal and consistent with surrounding tissue      Temperature: normal   Odor: none  Pain: mild,   Pain intervention prior to dressing change: slow and gentle cares   Treatment goal: Heal , Increase granulation and Protection  STATUS: edges are more defined, thinning yellow slough at center over bone  Supplies ordered: ordered additional large dressing and rings   ________________________________________________________________________________________________________________    Negative pressure wound therapy applied to: Sacrum & KIMBERLY fox   Last photo: see above  Wound due to: Pressure Injury   Wound history/plan of care:      Surgical date: 2/18/23     Date  Negative Pressure Wound Therapy initiated: 2/20/23     Interventions in place: repositioning, pressure redistribution with device or dressing, specialty surface in use, moisture/incontinence management and offloading    Is patient s nutritional status compromised? yes   a. If yes, what interventions are in place? Protein supplements    Reason for initiating vac therapy? Presence of co-morbidities, High risk of infections, Need for accelerated granulation tissue and Prior history of delayed wound healing    Which?of?the?following?co-morbidities?apply? Immobility  a. If diabetic is patient on a diabetic management program? N/A     Is osteomyelitis present in wound? Y  a.  If yes what treatments are in place? N/A    Number of foam pieces removed from a wound (excluding foam for bridge) : 4 black foam to right hip (1 extra to fill the depth at the center of the wound  Verified this matched the number of foam pieces applied last dressing change: no    Number of foam pieces packed into wound (excluding foam for bridge) :   To sacral wound: 1 black foam and ring along distal edge to prevent intrusion of stool, vashe soak x 10 minutes     _____________________________________________________________________________________________________________________________________________________________________    Pressure Injury Location: Bilateral heels      2/20 L heel                                                         2/27 2/20 R heel                                                         2/27     Wound type: Pressure Injury     Pressure Injury Stage: Unstageable, present on admission      Wound history/plan of care:   See above  Wound base: 100 % eschar     Palpation of the wound bed: firm      Drainage: none     Description of drainage: none     Measurements (length x width x depth, in cm)   L heel: 4  x 5.5 cm    R heel: 2  x 2 cm      Tunneling N/A     Undermining N/A  Periwound skin: Intact and Scar tissue       "Color: pink      Temperature: normal   Odor: none  Pain: absent, none  Pain intervention prior to dressing change: slow and gentle cares   Treatment goal: Maintain (prevention of deterioration) and offload pressure, reduce friction and shear  STATUS: stable   Supplies ordered: supplies stored on unit - no change in care needed        Treatment Plan:     Negative pressure wound therapy plan:  Wound location: Sacrum + R Trocanter    Change Days: Mon/Wed/Fri by C RN    Supplies (including all accessories) used: medium Black foam , Adapt barrier ring and Cavilon no sting barrier film  Cleanse with Vashe prior to replacing VAC    Suction setting: -125   Methods used: Window paned all periwound skin with vac drape prior to applying sponge and barrier rings around entire sacral wound to protect from stool     Staff RN to assess integrity of dressing and ensure suction is set at appropriate level every shift.   Date canister. Chart canister output every shift. Change cannister weekly and PRN if full/occluded.    Remove foam dressing and replace with BID normal saline moist gauze dressing if:   -a dressing failure which cannot be repaired within 2 hours   -patient is discharging to home without a home pump   -patient is discharging to a facility outside the local area   -if a dressing is a \"Silver Foam\", remove before Radiation Therapy or MRI     The hospital VAC pump is not to be discharged with the patient.?Ensure to disconnect patient from machine prior to discharge. Then,    - If a home KCI VAC pump has been delivered, connect home cannister to dressing tubing then connect cannister to home pump and turn on machine    - If transferring to a nearby facility with a KCI vac, can disconnect and clamp tubing then cover end with glove so can be reconnected within 2 hours      Heels  1. Paint with betadine daily, allow to dry  2. Offload heels at all times while in bed, utilize prevlon boots    RECOMMEND PRIMARY TEAM ORDER: " None, at this time  Education provided: importance of repositioning, wound progress, Infection prevention , Moisture management and Off-loading pressure  Discussed plan of care with: Patient, Family, Nurse and Physician  [facility] nurse follow-up plan: Monday/WednesdayFriday  Notify WOC if wound(s) deteriorate.  Nursing to notify the Provider(s) and re-consult the WO Nurse if new skin concern.    DATA:     Current support surface: Standard  Standard gel/foam mattress (IsoFlex, Atmos air, etc)  Containment of urine/stool: Incontinence Protocol  BMI: Body mass index is 26.48 kg/m .   Active diet order: Orders Placed This Encounter      Regular Diet Adult     Output: I/O last 3 completed shifts:  In: 120 [P.O.:120]  Out: 150 [Urine:150]     Labs:   Recent Labs   Lab 03/03/23  0645   ALBUMIN 1.4*   HGB 8.4*   WBC 5.0     Pressure injury risk assessment:   Sensory Perception: 3-->slightly limited  Moisture: 3-->occasionally moist  Activity: 2-->chairfast  Mobility: 2-->very limited  Nutrition: 2-->probably inadequate  Friction and Shear: 1-->problem  Lalit Score: 13    MUNIRA Gomez RN CWOCN  Pager no longer is use, please contact through [employer] group: [facility] [employer]

## 2023-03-09 NOTE — PLAN OF CARE
Problem: Pain Acute  Goal: Optimal Pain Control and Function  Outcome: Progressing  Intervention: Develop Pain Management Plan  Recent Flowsheet Documentation  Taken 3/8/2023 1700 by Diana Alford RN  Pain Management Interventions: medication (see MAR)  Intervention: Prevent or Manage Pain  Recent Flowsheet Documentation  Taken 3/8/2023 1700 by Diana Alford RN  Sensory Stimulation Regulation: television on     Problem: Pain Acute  Goal: Optimal Pain Control and Function  Intervention: Prevent or Manage Pain  Recent Flowsheet Documentation  Taken 3/8/2023 1700 by Diana Alford RN  Sensory Stimulation Regulation: television on     Problem: Diabetes Comorbidity  Goal: Blood Glucose Level Within Targeted Range  Outcome: Progressing     Problem: Oral Intake Inadequate  Goal: Improved Oral Intake  Outcome: Progressing   Goal Outcome Evaluation: Pt A/O X2, tolerated TF at 30ml/hr,   and 189 covered with scheduled insulin, denies pain on assessment. Pt reposition several times, pt K+ 1.3 and phosp. 1.4, replacement order and recheck in AM.

## 2023-03-09 NOTE — PROGRESS NOTES
[facility] Hospitalist Progress Note  [facility]  Admission date: 2/16/2023    Summary:    72F h/o bedbound nursing home resident, sacral decubitus ulcer (present on admission), CVA, dementia, neuropathy, DM2, CKD, CAD, failure to thrive admitted on 2/16/2023 with infected new sacral decubitus ulcer with possible necrotizing fasciitis.  Underwent I&D (2/17) with necrotizing tissue down to the sacrum and therefore with osteomyelitis.      Course has been complicated by malnutrition, limited PO, and family discord.    Assessment/Plan    #Severe sepsis secondary to infected stage IV sacral and right greater trochanter decubitus ulcers with sacral osteomyelitis  -zosyn x 4-6 weeks from source control (2/17).  PICC placed  -VAC    #Moderate malnutrition, weight loss, failure to thrive  -review of prior notes regarding EGD, alternative feeds.  Agrees to NG for now, in discussion with Court Ms. Preciado has had a bad experience with G-tube in family and would like to start with NG.  Reviewed that NG is short term.  If needs alternative feeding for longer period Ms. Preciado will need an NG.    -NG placed 3/9 and started TF.    -Ideally oral intake will improve and will not need tube feeds.  Keep TF at 40/hr (about half goal) and continue to monitor calorie counts and encourage PO intake.   If oral intake not improving will need to consider PEG.    -monitor Mg, phos, K for refeeding.    #Type II DM : HgbA1c on 1/3/2023 was 7.6 % and multiple hypoglycemic episodes in setting of limited PO  -Home regimen: lantus 20 units QAM and Novolog sliding scale. PTA Victoza and metformin on hold.  -Inpatient:  Lantus to 10 units, sliding scale insulin.  Adjust with initiation of tube feeds     #HTN: metoprolol.     #History of CAD:  Metoprolol, lipitor, asa      #Goal of care:   -reviewed prior note from Dr. Miranda outlining issues.  -Honoring choices filled out nominating daughter Court as surrogate if needed for  "decisions.    -Acute blood loss anemia: Hgb was 5.7 after surgery. Transfused 2 units of packed RBCs.  Hemoglobin has been stable.   -Hyponatremia, hypokalemia, hypomag  -hypercalcemia - mild on admission and probably from immobility vs. dehydration.  resolved  -Pressure ulcer on bilateral heels, unstageable: Present on admission. L heel: 4  x 5.5 cm  and R heel: 2  x 2 cm, the skin surface is black.  - Wound care consulted. On offload boots.    -History of ulcer, GERD: on PPI.   -Hyperlipidemia: statin.         Checklist:  Code Status: Full Code    Diet: Snacks/Supplements Adult: Magic Cup; With Meals  Regular Diet Adult  Adult Formula Drip Feeding: Continuous Promote w fiber; Nasogastric tube; Goal Rate: 75; mL/hr; When TF placement confirmed, start at 10 mL/hr and increase by 10 mL every 8 hours, as tolerated, to goal rate of 75 mL/hr  Snacks/Supplements Adult: Terrence; With Meals     DVT px:  Enoxaparin (Lovenox) SQ    Disposition and Discharge Planning  Auto-populated from discharge tab:     Expected Discharge Date: 03/13/2023    Discharge Delays: Placement - LTC  Destination: long-term care facility  Discharge Comments: needs tube feeding, EGD 3/1 in OR  wants new LTC  care conference         System Identified Risk Variables  Auto-populated based on system request - if relevant they will be addressed above:  Clinically Significant Risk Factors            # Hypomagnesemia: Lowest Mg = 1.3 mg/dL in last 2 days, will replace as needed   # Hypoalbuminemia: Lowest albumin = 0.9 g/dL at 2/23/2023  7:22 AM, will monitor as appropriate          # DMII: A1C = 7.6 % (Ref range: <5.7 %) within past 6 months   # Overweight: Estimated body mass index is 26.48 kg/m  as calculated from the following:    Height as of 2/9/23: 1.702 m (5' 7\").    Weight as of this encounter: 76.7 kg (169 lb 1.5 oz).   # Moderate Malnutrition: based on nutrition assessment            Interval Events/Subjective/Notable results:    RN note reviewed " - poor PO intake yesterday.  NG placed and TF started.  BGs running high, lantus increased  Not excited about keeping tube or PEG.  Pt reports intake is improving.  Encouraged PO.  D/w nutrition re: TF regimen and calorie counts    Objective    Vital signs in last 24 hours  Temp:  [97.3  F (36.3  C)-98.4  F (36.9  C)] 98.4  F (36.9  C)  Pulse:  [61-73] 73  Resp:  [18-19] 18  BP: (134-154)/(60-67) 140/61  SpO2:  [100 %] 100 % O2 Device: None (Room air)    Weight:   169 lbs 1.49 oz  Body mass index is 26.48 kg/m .    Intake/Output last 3 shifts  I/O last 3 completed shifts:  In: 1920 [P.O.:400; I.V.:1210; NG/GT:310]  Out: 1250 [Urine:1150; Drains:100]    Physical Exam  General:  Alert, cooperative, no distress, frail  Neurologic:  oriented, facial symmetry preserved, fluent speech.   Psych: calm  HEENT:  Anicteric, MMM, NG  CV:  no pitting edema  Lungs: CTAB.  Easyrespirations  Skin: no rashes or jaundice noted on exposed skin.    Cornejo Catheter: Not present  Lines: PRESENT      PICC 02/17/23 Triple Lumen Right Basilic Vascular access-Site Assessment: WDL         Medical Decision Making           Jeni Gonzalez MD, [facility]  Internal Medicine Hospitalist

## 2023-03-09 NOTE — PLAN OF CARE
"  Problem: Plan of Care - These are the overarching goals to be used throughout the patient stay.    Goal: Plan of Care Review  Description: The Plan of Care Review/Shift note should be completed every shift.  The Outcome Evaluation is a brief statement about your assessment that the patient is improving, declining, or no change.  This information will be displayed automatically on your shift note.  Outcome: Adequate for Care Transition  Goal: Patient-Specific Goal (Individualized)  Description: You can add care plan individualizations to a care plan. Examples of Individualization might be:  \"Parent requests to be called daily at 9am for status\", \"I have a hard time hearing out of my right ear\", or \"Do not touch me to wake me up as it startles me\".  Outcome: Adequate for Care Transition  Goal: Absence of Hospital-Acquired Illness or Injury  Outcome: Adequate for Care Transition  Intervention: Prevent Skin Injury  Recent Flowsheet Documentation  Taken 3/9/2023 0400 by Marcella No RN  Body Position:   turned   supine, head elevated  Taken 3/9/2023 0200 by Marcella No RN  Body Position:   left   turned  Goal: Optimal Comfort and Wellbeing  Outcome: Adequate for Care Transition  Goal: Readiness for Transition of Care  Outcome: Adequate for Care Transition     Problem: Risk for Delirium  Goal: Optimal Coping  Outcome: Adequate for Care Transition  Goal: Improved Behavioral Control  Outcome: Adequate for Care Transition  Goal: Improved Attention and Thought Clarity  Outcome: Adequate for Care Transition     Problem: Pain Acute  Goal: Optimal Pain Control and Function  Outcome: Adequate for Care Transition     Problem: Electrolyte Imbalance  Goal: Electrolyte Imbalance: Plan of Care  Outcome: Adequate for Care Transition     Problem: Diabetes Comorbidity  Goal: Blood Glucose Level Within Targeted Range  Outcome: Adequate for Care Transition     Problem: Violence Risk or Actual  Goal: Anger and Impulse " Control  Outcome: Adequate for Care Transition     Problem: Oral Intake Inadequate  Goal: Improved Oral Intake  Outcome: Adequate for Care Transition     Problem: Wound Healing Progression  Goal: Optimal Wound Healing  Outcome: Adequate for Care Transition  Intervention: Promote Wound Healing  Recent Flowsheet Documentation  Taken 3/9/2023 0400 by Marcella No RN  Activity Management: activity minimized  Taken 3/9/2023 0200 by Marcella No RN  Activity Management: activity minimized     Problem: Malnutrition  Goal: Improved Nutritional Intake  Outcome: Adequate for Care Transition     Problem: Enteral Nutrition  Goal: Absence of Aspiration Signs and Symptoms  Outcome: Adequate for Care Transition  Intervention: Minimize Aspiration Risk  Recent Flowsheet Documentation  Taken 3/9/2023 0400 by Marcella No RN  Head of Bed (HOB) Positioning: HOB at 20-30 degrees  Taken 3/9/2023 0200 by Marcella No RN  Head of Bed (HOB) Positioning: HOB at 20-30 degrees  Goal: Safe, Effective Therapy Delivery  Outcome: Adequate for Care Transition  Goal: Feeding Tolerance  Outcome: Adequate for Care Transition   Goal Outcome Evaluation:  Cristal remained hemodynamically stable and was pain free overnight. She had two large loose bowel movements overnight. Tube feedings were increase to 40ml/hr. Zosyn given.

## 2023-03-09 NOTE — PLAN OF CARE
Problem: Plan of Care - These are the overarching goals to be used throughout the patient stay.    Goal: Optimal Comfort and Wellbeing  Intervention: Provide Person-Centered Care  Recent Flowsheet Documentation  Taken 3/9/2023 0830 by Emmy Ren RN  Trust Relationship/Rapport:   care explained   reassurance provided     Problem: Risk for Delirium  Goal: Improved Behavioral Control  Intervention: Minimize Safety Risk  Recent Flowsheet Documentation  Taken 3/9/2023 0830 by Emmy Ren RN  Trust Relationship/Rapport:   care explained   reassurance provided   Goal Outcome Evaluation:       Patient denies pain without movement, however yells out with movement. She was turned and repositioned for comfort. She only wanted eggs and deleon for breakfast and lunch which she had. Poor fluid intake even with encouragement. NG tube in place, feeding at 40 ml per hour. New orders not to increase feeding rate.

## 2023-03-09 NOTE — PROGRESS NOTES
Nutrition Therapy  Enteral Nutrition follow-up      Current TF regimen/implementation plan:  Continue TF regimen promote w/ fiber @ 40 mL/hr for today (hold TF advancement) per discussion w/ physician. Pt reported to have increased oral intake and declined PEG placement to physician. Physician would like to hold the TF advancement today and continue to do calorie counts to determine TF plan.     - Glucose level is running high, the current insulin is medium sliding scale insulin. Suggest to have high sliding scale insulin. RD team will monitor glucose level. If continues to run high, RD team will switch to a lower CHO formula.    RD Recommendations already implemented  RD team suggested the insulin sliding scale to physician through sticky notes.     RD future recommendation:  RD team consider to order multivitamin to add to the feed poor po intake since admission and PTA + wound.         Current Nutrition Intake:  Diet: regular diet    Supplement: magic cup    Intake:   3/8: ate 50% breakfast (206 kcal & 9 g protein) & few bite of magic cup for lunch (~70 kcal, 2 g protein)     Nutrition Support: TF through NG tube    Promote w/ fiber @ 40 mL/hr (960 mL/hr + 2 pkts of Terrence daily, will provide 960 kcal (14.6 kcal/kg), 60 g protein (0.9 g pro/kg), 798 ml free water, 133 g CHO, and 13.4 g fiber. Regimen meets 59% of minimum estimated energy needs and 76% of minimum estimated protein needs.    - 30 mL q 4hrs fluid flushes for tube patency. Fluid restriction or addition fluids adjustments per MD    IF pt's p.o. intake continues to not meeting the 75% estimated nutrition needs, consider to reinitiate TF regimen advancement to goal rate of 75 mL/hr with following instruction:     Once okay to advance OR K+/= 3, Mag ++ >/= 1.5, and phos >/+ 1.9, advance by 10 mL q 8 hrs as tolerated to eventually goal rate of 75 mL/hr.      Monitor ability to advance to goal EN support,  Promote w/ fiber @ goal of 75 mL/hr (1800 mL/hr),  will provides 1960 kcal (27.4 kcal/kg), 117 g protein (1.78 g pro/kg), 1496 mL free water, 248 g CHO, and 25 g fiber daily.      Weight Trends  Admission wt: 78.1 kg (172 lb 2.9 oz)    Current wt:  76.7 kg (169 lb 1.5 oz) (3/)     Dosing Weight:  65.6 kg (adjusted wt)    ASSESSED NUTRITION NEEDS:  Estimated Energy Needs: 4430-9923 kcals/day (25 - 30 kcals/kg)  Justification: Maintenance  Estimated Protein Needs: 79-99 grams protein/day (1.2 - 1.5 grams of pro/kg)  Justification: Wound healing  Estimated Fluid Needs: 1968 mL/day ({30 ml/Kg)   Justification: Maintenance     GI:  BM:   3/8: loose brown stool 3x  3/9: two large loose bowel movement overnights    Fluid from meds:  ~300 mL from zosyn daily + 240 - 720 mL sodium chloride    Labs:   Sodium: 137 mg/dL (WNL)  Potassium: 3.7 mg/dL (WNL)  Magnesium: 2.0 mg/dL (WNL)  Phosphorus: 2.3 mg/dL (low, 3/8's night)  Glucose monitorin - 271 mg/dL (trending high)    Medications:   Lipitor, novolog, lantus, lopressor, remeron, protonix, zosyn, potassium & sodium phosphates, potassium chloride, thiamine, desyrel    Nutrition Diagnosis  Malnutrition related to poor intake as evidenced by less than 50% food intake since admission (likely poor po intake for a while PTA per conversation).   Evaluation: progressing; started TF on 3/8     Previous Goals  P.O. intake >50% of estimated nutrition needs, or equivalent with supplements. - not met     INTERVENTIONS  Implementation  - Per conversation with physician, pt declined to have PEG tube placement and reported to have more oral intake now. Per discussion, RD team will stay the current TF regimen @ 40 mL/hr for today and will initiate calorie counts to monitor pt's p.o. intake to determine TF plan.     Continue TF regimen promote w/ fiber @ 40 mL/hr for today per conversation with physician:     - Glucose level running high, the current insulin is medium sliding scale insulin. Suggest to have high sliding scale insulin. LORRIE  team will monitor glucose level. If continues to run high, RD team will switch to a lower CHO formula.    Current Goals:  Tolerating TF  Glucose <180 mg/dL  Renal lab WNL    Monitoring/Evaluation  RD team consider to order multivitamin to add to the feed poor po intake since admission and PTA + wound.  Progress towards goals will be monitored and evaluated per protocol and Practice Guidelines     Ira Smith) Thee Dietetic Intern

## 2023-03-10 NOTE — PROGRESS NOTES
[facility]    Medicine Progress Note - Hospitalist Service    Date of Admission:  2/16/2023    Assessment & Plan     72F h/o bedbound nursing home resident, sacral decubitus ulcer (present on admission), CVA, dementia, neuropathy, DM2, CKD, CAD, failure to thrive admitted on 2/16/2023 with infected new sacral decubitus ulcer with possible necrotizing fasciitis.  Underwent I&D (2/17) with necrotizing tissue down to the sacrum and therefore with osteomyelitis.       Course has been complicated by malnutrition, limited PO          1.Severe sepsis secondary to infected stage IV sacral and right greater trochanter decubitus ulcers with sacral osteomyelitis  -zosyn x 4-6 weeks from source control (2/17).  PICC placed  -VAC     2.Moderate malnutrition, weight loss, failure to thrive  -review of prior notes regarding EGD, alternative feeds.  Agrees to NG for now, in discussion with Court Ms. Preciado has had a bad experience with G-tube in family and would like to start with NG.  Reviewed that NG is short term.  If needs alternative feeding for longer period Ms. Preciado will need an NG.    -NG placed 3/9 and started TF.    -Ideally oral intake will improve and will not need tube feeds.  Keep TF at 40/hr (about half goal) and continue to monitor calorie counts and encourage PO intake.  Still monitor calorie count now. Still not clear if will need PEG or GJ  -monitor Mg, phos, K for refeeding.     3.Type II DM : HgbA1c on 1/3/2023 was 7.6 % and multiple hypoglycemic episodes in setting of limited PO  -Home regimen: lantus 20 units QAM and Novolog sliding scale. PTA Victoza and metformin on hold.  -Inpatient:  Lantus to 10 units--increase to 14 units, sliding scale insulin.  Adjust with initiation of tube feeds  -add sliding scale     4.HTN: metoprolol.     5.History of CAD:  Metoprolol, lipitor, asa      6.Goal of care:   -reviewed prior note from Dr. Miranda outlining issues.  -Honoring choices  "filled out nominating truman Yun as surrogate if needed for decisions.     7.Acute blood loss anemia: Hgb was 5.7 after surgery. Transfused 2 units of packed RBCs.  Hemoglobin has been stable.    8.Hyponatremia, hypokalemia, hypomag    9.hypercalcemia - mild on admission and probably from immobility vs. dehydration.  resolved    10.Pressure ulcer on bilateral heels, unstageable: Present on admission. L heel: 4  x 5.5 cm  and R heel: 2  x 2 cm, the skin surface is black.  - Wound care consulted. On offload boots.      11.History of ulcer, GERD: on PPI.    12.Hyperlipidemia: statin.    Social --I called truman Yun to update at 1350--no answer and mailbox was full so could not leave message         Diet: Snacks/Supplements Adult: Magic Cup; With Meals  Regular Diet Adult  Snacks/Supplements Adult: Terrence; With Meals  Calorie Counts  Adult Formula Drip Feeding: Continuous Promote w fiber; Nasogastric tube; Goal Rate: 40; mL/hr; no further advancement for today    DVT Prophylaxis: Enoxaparin (Lovenox) SQ  Cornejo Catheter: Not present  Lines: PRESENT      PICC 02/17/23 Triple Lumen Right Basilic Vascular access-Site Assessment: WDL      Cardiac Monitoring: None  Code Status: Full Code      Clinically Significant Risk Factors              # Hypoalbuminemia: Lowest albumin = 0.9 g/dL at 2/23/2023  7:22 AM, will monitor as appropriate          # DMII: A1C = 7.6 % (Ref range: <5.7 %) within past 6 months   # Overweight: Estimated body mass index is 26.48 kg/m  as calculated from the following:    Height as of 2/9/23: 1.702 m (5' 7\").    Weight as of this encounter: 76.7 kg (169 lb 1.5 oz).   # Moderate Malnutrition: based on nutrition assessment        Disposition Plan      Expected Discharge Date: 03/13/2023    Discharge Delays: Placement - LTC  Destination: long-term care facility  Discharge Comments: needs tube feeding, EGD 3/1 in OR  wants new LTC  care conference          Milagro Hoyt MD  Hospitalist " Service  [facility]  Securely message with Maria De Jesus (more info)  Text page via [employer] Paging/Directory   ______________________________________________________________________    Interval History   She had no complaints  She seemed to tolerate NG TF  Also staff note she is eating some and on calorie count    Physical Exam   Vital Signs: Temp: 98.3  F (36.8  C) Temp src: Oral BP: (!) 146/63 Pulse: 60   Resp: 18 SpO2: 98 % O2 Device: None (Room air)    Weight: 169 lbs 1.49 oz    Constitutional: awake, fatigued, alert, cooperative and no apparent distress  Eyes: sclera clear  Respiratory: no increased work of breathing, good air exchange, no retractions and clear to auscultation  Cardiovascular: Normal apical impulse, regular rate and rhythm, normal S1 and S2, no S3 or S4, and no murmur noted  GI: normal bowel sounds, soft, non-distended and non-tender  Skin: no bruising or bleeding  Musculoskeletal: no lower extremity pitting edema present  Neurologic: Mental Status Exam:  Level of Alertness:   awake  Neuropsychiatric: General: normal, calm and normal eye contact    Medical Decision Making       35 MINUTES SPENT BY ME on the date of service doing chart review, history, exam, documentation & further activities per the note.      Data     I have personally reviewed the following data over the past 24 hrs:    4.5  \   7.0 (L)   / 159     135 (L) 109 (H) 7.4 (L) /  296 (H)   4.3 19 (L) 0.62 \       Imaging results reviewed over the past 24 hrs:   No results found for this or any previous visit (from the past 24 hour(s)).

## 2023-03-10 NOTE — PLAN OF CARE
Goal Outcome Evaluation:       Patient denied pain. Wound vac dressing was changed by woc nurse. Phosphorus replaced, recheck to be done after infusion complete. Tube feeding continue at 40 ml/hr. Writer received call for dietician who stated patient tube feeding would be changed to a different one due to elevated blood sugars. Report have been given to incoming nurse.

## 2023-03-10 NOTE — PROGRESS NOTES
"Care Management Follow Up    Length of Stay (days): 22    Expected Discharge Date: 03/15/2023     Concerns to be Addressed: discharge planning     Patient plan of care discussed at interdisciplinary rounds: Yes    Anticipated Discharge Disposition: Long Term Care     Anticipated Discharge Services: Other (see comment) (nursing care services, assistance with ADLs, care and supervision)  Anticipated Discharge DME: None    Patient/family educated on Medicare website which has current facility and service quality ratings: yes  Education Provided on the Discharge Plan:  yes  Patient/Family in Agreement with the Plan: yes    Referrals Placed by CM/SW: Post Acute Facilities  Private pay costs discussed: Not applicable    Additional Information:  3:51 PM  Chart reviewed, discussed with patient and with Dr. Cordova.    Pt has a wound vac.  3/9 NG placed for TF.  Pt continues with TF, oral intake with calorie count being monitored.    Pt informed writer she is refusing PEG, hopeful to have NG removed.    Social history: \"Patient resides at [facility] where she shares a room with her daughter who is also in LTC.      Patient's family declined for patient to return to [facility]. They did file reports against the facility. Patient was on a bed hold, however that bed hold has now . CM is working on obtaining a new LTC placement for patient. Patient at baseline is a lift transfer, has a wheelchair.\"    Pt needs LTC placement.  Pt has HCD documents. Daughter Court is [facility] - if pt defers decision-making to Court.    CM will continue to follow and assist with discharge planning needs.    Hallie Falk RN      "

## 2023-03-10 NOTE — PROGRESS NOTES
"[employer] Wound Vac:  [employer] wound Vac was ordered for pt in anticipation that pt would need a wound vac going to LTC but this is not correct and the [employer] \"Home\" wound vac was returned to the store room and [employer] will come and pick this up on satruday 3/11 or Monday 3/13  "

## 2023-03-10 NOTE — PROGRESS NOTES
Discharge planning assist    Length of Stay (days): 22    Expected Discharge Date: 03/13/2023     Concerns to be Addressed:  Home V.A.C. delivered by [facility]: patient going to long term care so no home V.A.C. needed.     Anticipated Discharge Disposition: Long term care  Anticipated Discharge Services: Total care  Anticipated Discharge DME: VAC, hospital bed, tube feeding supplies etc.     Referrals Placed by CM/SW: See previous care management notes.  Private pay costs discussed: See previous care management notes.     Additional Information:  Received update that a Home V.A.C. had been delivered to patient's room. However, patient will be discharged to long term care and the SNF will supply their own equipment. Picked up Home V.A.C. and returned to [employer]. RO [id number].    Libertad Worrell RN

## 2023-03-10 NOTE — PLAN OF CARE
Problem: Enteral Nutrition  Goal: Feeding Tolerance  Outcome: Progressing   Goal Outcome Evaluation:       Patient tolerating tube feeding running at 40 ml an hour.

## 2023-03-10 NOTE — PROGRESS NOTES
[facility] Nurse Inpatient Assessment     Consulted for: Buttocks, heels  3/10 - only Sacrum and R Trocanter wounds addressed in assessment today. Remaining wound assessments kept for continuity.     Patient History (according to provider note(s):      Cristal Preciado is a 72 year old female with PMH significant for known sacral decubitus ulcer (present on admission), CVA, dementia, neuropathy, DM2, CKD, CAD and FTT admitted on 2/16/2023 with infected new sacral decubitus ulcer with possible necrotizing fasciitis.      1. Sepsis- Admit patient. Infected Sacral Decubitus ulcer/Possible Necrotizing fasciits,UTI,Lactic acid= 6.4, WBC= 14.1. CT pelvis report reviewed. ED physician has already been in contact with General Surgeon on call and patient will be seen in consultation. Continue Vancomycin, Zosyn and Clindamycin. Continue IV fluids. Currently NPO. Repeat labs in a.m. Monitor vitals closely. Will make further recommendations based on clinical course.     2. Possible Necrotizing Fasciitis- ED has contacted General Surgeon on call and patient will be seen in consultation. Continue Vancomycin, Zosyn and Clindamycin. Plan per surgeon.    Areas Assessed:      Pressure Injury Location: Sacrum  (additional pictures available in media tab)                                                        2/27, view into the undermining to bottom half of wound       3/6      3/10      Wound type: Pressure Injury     Pressure Injury Stage: 4, present on admission   Wound history/plan of care:   Patient admitted with worsening pressure wounds, was taken for an I&D  Wound base: 30 % slough and bone, 70 % dermis, non-granular tissue and adipose tissue, scant granulation starting. Tissue looks more pale today.     Palpation of the wound bed: boggy      Drainage: scant     Description of drainage: serosanguinous     Measurements (length x width x depth, in cm) 10  x 12  x  3.5 cm      Tunneling N/A      Undermining up to 1.5 cm from 8-11 o'clock  Periwound skin: Intact      Color: normal and consistent with surrounding tissue, slight purple discoloration near gluteal cleft      Temperature: normal   Odor: none  Pain: moderate, with palpation and during dressing change  Pain intervention prior to dressing change: slow and gentle cares   Treatment goal: Heal , Increase granulation and Protection  STATUS: evolving  Supplies ordered: gathered    My PI Risk Assessment     Sensory Perception: 2 - Very Limited     Moisture: 1 - Constantly moist     Activity: 1 - Bedfast      Mobility: 2 - Very limited     Nutrition: 2 - Probably inadequate      Friction/Shear: 1 - Problem     TOTAL: 9  __________________________________________________________________________________________________________________  Pressure Injury Location: [facility]         3/3     3/6    3/10      Wound type: Pressure Injury     Pressure Injury Stage: 4, present on admission   Wound history/plan of care:   See above  Wound base: 30 % slough and bone, 70 % subcutaneous tissue       Palpation of the wound bed: normal      Drainage: scant     Description of drainage: serosanguinous     Measurements (length x width x depth, in cm) 10  x 13  x  3 cm      Tunneling N/A     Undermining N/A  Periwound skin: Intact      Color: normal and consistent with surrounding tissue      Temperature: normal   Odor: none  Pain: mild,   Pain intervention prior to dressing change: slow and gentle cares   Treatment goal: Heal , Increase granulation and Protection  STATUS: edges are more defined, thinning yellow slough at center over bone  Supplies ordered: ordered additional large dressing and rings   ________________________________________________________________________________________________________________    Upon entering room, noted that VAC pump was turned off and not connected to the charging cord. Once pump was turned on and plugged back in, reviewed history on  pump. The VAC pump was turned off 3/8 at 2026 and was not turned back on until this morning by writer. VAC had been off for over 24 hours.     Negative pressure wound therapy applied to: Sacrum & R osvaldoter   Last photo: see above  Wound due to: Pressure Injury   Wound history/plan of care:      Surgical date: 2/18/23     Date Negative Pressure Wound Therapy initiated: 2/20/23     Interventions in place: repositioning, pressure redistribution with device or dressing, specialty surface in use, moisture/incontinence management and offloading    Is patient s nutritional status compromised? yes   a. If yes, what interventions are in place? Protein supplements    Reason for initiating vac therapy? Presence of co-morbidities, High risk of infections, Need for accelerated granulation tissue and Prior history of delayed wound healing    Which?of?the?following?co-morbidities?apply? Immobility  a. If diabetic is patient on a diabetic management program? N/A     Is osteomyelitis present in wound? Y  a.  If yes what treatments are in place? N/A    Number of foam pieces removed from a wound (excluding foam for bridge) : 4 black foam to right hip (1 extra to fill the depth at the center of the wound  Verified this matched the number of foam pieces applied last dressing change: no    Number of foam pieces packed into trochanter (excluding foam for bridge) : 1   To sacral wound: 1 black foam and ring along distal edge to prevent intrusion of stool, vashe soak x 10 minutes. Bridge between both wounds    _____________________________________________________________________________________________________________________________________________________________________    Pressure Injury Location: Bilateral heels      2/20 L heel                                                         2/27 2/20 R heel                                                         2/27     Wound type: Pressure Injury     Pressure Injury Stage:  "Unstageable, present on admission      Wound history/plan of care:   See above  Wound base: 100 % eschar     Palpation of the wound bed: firm      Drainage: none     Description of drainage: none     Measurements (length x width x depth, in cm)   L heel: 4  x 5.5 cm    R heel: 2  x 2 cm      Tunneling N/A     Undermining N/A  Periwound skin: Intact and Scar tissue      Color: pink      Temperature: normal   Odor: none  Pain: absent, none  Pain intervention prior to dressing change: slow and gentle cares   Treatment goal: Maintain (prevention of deterioration) and offload pressure, reduce friction and shear  STATUS: stable   Supplies ordered: supplies stored on unit - no change in care needed        Treatment Plan:     Negative pressure wound therapy plan:  Wound location: Sacrum + R Trocanter    Change Days: Mon/Wed/Fri by WOC RN    Supplies (including all accessories) used: medium Black foam , [employer] barrier ring and Cavilon no sting barrier film  Cleanse with Vashe prior to replacing VAC    Suction setting: -125   Methods used: Window paned all periwound skin with vac drape prior to applying sponge and barrier rings around entire sacral wound to protect from stool     Staff RN to assess integrity of dressing and ensure suction is set at appropriate level every shift.   Date canister. Chart canister output every shift. Change cannister weekly and PRN if full/occluded.    Remove foam dressing and replace with BID normal saline moist gauze dressing if:   -a dressing failure which cannot be repaired within 2 hours   -patient is discharging to home without a home pump   -patient is discharging to a facility outside the local area   -if a dressing is a \"Silver Foam\", remove before Radiation Therapy or MRI     The hospital VAC pump is not to be discharged with the patient.?Ensure to disconnect patient from machine prior to discharge. Then,    - If a home KCI VAC pump has been delivered, connect home cannister to dressing " tubing then connect cannister to home pump and turn on machine    - If transferring to a nearby facility with a KCI vac, can disconnect and clamp tubing then cover end with glove so can be reconnected within 2 hours      Heels  1. Paint with betadine daily, allow to dry  2. Offload heels at all times while in bed, utilize prevlon boots    RECOMMEND PRIMARY TEAM ORDER: None, at this time  Education provided: importance of repositioning, wound progress, Infection prevention , Moisture management and Off-loading pressure  Discussed plan of care with: Patient and Nurse  [facility] nurse follow-up plan: Monday/WednesdayFriday  Notify WOC if wound(s) deteriorate.  Nursing to notify the Provider(s) and re-consult the WO Nurse if new skin concern.    DATA:     Current support surface: Standard  Standard gel/foam mattress (IsoFlex, Atmos air, etc)  Containment of urine/stool: Incontinence Protocol  BMI: Body mass index is 26.48 kg/m .   Active diet order: Orders Placed This Encounter      Regular Diet Adult     Output: I/O last 3 completed shifts:  In: 310 [P.O.:280; NG/GT:30]  Out: 700 [Urine:700]     Labs:   Recent Labs   Lab 03/10/23  0651   HGB 7.0*   WBC 4.5     Pressure injury risk assessment:   Sensory Perception: 3-->slightly limited  Moisture: 2-->very moist  Activity: 1-->bedfast  Mobility: 2-->very limited  Nutrition: 3-->adequate  Friction and Shear: 2-->potential problem  Lalit Score: 13    CRIS PierceN RN CWOCN, CFCN  Pager no longer is use, please contact through [employer] group: [facility]

## 2023-03-10 NOTE — PLAN OF CARE
Inadequate oral intake: not progressing - see calorie count    Malnutrition: not progressing    Enteral Nutrition: progressing    Current TF regimen/implementation plan:  Nutrition Support: TF through NG tube  Switched TF regimen to Diabetisource AC @ 40 mL/hr (960 mL/hr) + 2 pkt of unflavored Terrence daily for today.    Goal Outcome Evaluation:  Calorie count - for 3/9 food intake  - per RN's recording for 3/9, breakfast + lunch total had 64 kcal. Based on the kcal intake and food ordering. Pt continue to not meet the estimated nutrition needs.    Lab:   Phosphorus: 1.1 mg/dL (low)  Sodium: 135 mg/dL (low)  Potassium and magnesium: WNL  Glucose: 296 mg/dL (high)    Dosing Weight:  65.6 kg (adjusted wt)     ASSESSED NUTRITION NEEDS:  Estimated Energy Needs: 5427-4484 kcals/day (25 - 30 kcals/kg)  Justification: Maintenance  Estimated Protein Needs: 79-99 grams protein/day (1.2 - 1.5 grams of pro/kg)  Justification: Wound healing  Estimated Fluid Needs: 1968 mL/day ({30 ml/Kg)   Justification: Maintenance    Implementation:  Due to Glucose continues to run high/signs of refeeding, will switch to Diabetisource AC (lower in CHO)    Writer called diabetes educator to inform the CHO change. Diabetes educator is aware of the changes and informed the RD team that no action needed on physician's end at this time. RD team and diabetes educators team will continue to monitor the glucose level & re-evaluate if needed.    Nutrition Support: TF through NG tube   Diabetisource AC @ 40 mL/hr (960 mL/hr) + 2 pkt of unflavored Terrence daily, will provide 1312 kcal (20 kcal/kg), 63 g protein (0.95 g pro/kg), 783 ml free water, 104 g CHO, and 14.6 g fiber. Regimen meets 80% of minimum estimated energy needs and minimum estimated protein needs.     - 60 mL q 4hrs fluid flushes for tube patency.    - please reassess water/hydration status + updates free water flushes when needed.     IF pt's p.o. intake continues to not meeting the 75%  estimated nutrition needs, consider to reinitiate TF regimen advancement to goal rate of 65 mL/hr with following instruction:     Monitor ability to advance to goal EN support,  Diabetisource AC @ goal of 65 mL/hr (1560 mL/hr) + 2 pkt unflavored Terrence, will provides 2032 kcal (31 kcal/kg), 99 g protein (1.5 g pro/kg), 1273 mL free water, 164 g CHO, and 23.7 g fiber daily.     Ira (Mena) Thee Dietetic Intern

## 2023-03-10 NOTE — PLAN OF CARE
Problem: Hypertension Acute  Goal: Blood Pressure Within Desired Range  3/9/2023 1839 by Emmy Ren, RN  Outcome: Progressing     Problem: Plan of Care - These are the overarching goals to be used throughout the patient stay.    Goal: Optimal Comfort and Wellbeing  Intervention: Provide Person-Centered Care  Recent Flowsheet Documentation  Taken 3/9/2023 1620 by Emmy Ren, RN  Trust Relationship/Rapport:   care explained   reassurance provided  Taken 3/9/2023 0830 by Emmy Ren, RN  Trust Relationship/Rapport:   care explained   reassurance provided     Problem: Risk for Delirium  Goal: Improved Behavioral Control  Intervention: Minimize Safety Risk  Recent Flowsheet Documentation  Taken 3/9/2023 1620 by Emmy Ren, RN  Trust Relationship/Rapport:   care explained   reassurance provided  Taken 3/9/2023 0830 by Emmy Ren, RN  Trust Relationship/Rapport:   care explained   reassurance provided   Goal Outcome Evaluation:       Patient blood sugar prior to dinner 231, coverage given. Wound vac in place functional with minimal drainage. NG tube in place, tube feeding at 40 ml/ hour. Turned and repositioned for comfort.

## 2023-03-11 NOTE — PROGRESS NOTES
Calorie Count Note    Date of Calorie Count: 3/10/23    Meals Recorded: 3 (not sure if pt is eating any Magic Cup)    Calories: 599kcals    Protein: 53g protein

## 2023-03-11 NOTE — PLAN OF CARE
Problem: Plan of Care - These are the overarching goals to be used throughout the patient stay.    Goal: Absence of Hospital-Acquired Illness or Injury  Intervention: Identify and Manage Fall Risk  Recent Flowsheet Documentation  Taken 3/11/2023 0130 by Estefania Watkins, RN  Safety Promotion/Fall Prevention: bed alarm on  Intervention: Prevent Skin Injury  Recent Flowsheet Documentation  Taken 3/11/2023 0130 by Estefania Watkins, RN  Body Position:    turned    supine  Goal: Optimal Comfort and Wellbeing  Intervention: Monitor Pain and Promote Comfort  Recent Flowsheet Documentation  Taken 3/11/2023 0130 by Estefania Watkins, RN  Pain Management Interventions: medication (see MAR)  Intervention: Provide Person-Centered Care  Recent Flowsheet Documentation  Taken 3/11/2023 0130 by Estefania Watkins, RN  Trust Relationship/Rapport:    care explained    emotional support provided   Goal Outcome Evaluation:       Pt very angry about being repositioned off of her left side. Explained cares reasoning to pt. Pt not accepting of writers explanation. Robaxin given. Pt tolerated next repositioning better, but still cries. Wound vac intact.

## 2023-03-11 NOTE — PROGRESS NOTES
[facility]    Medicine Progress Note - Hospitalist Service    Date of Admission:  2/16/2023    Assessment & Plan     72F h/o bedbound nursing home resident, sacral decubitus ulcer (present on admission), CVA, dementia, neuropathy, DM2, CKD, CAD, failure to thrive admitted on 2/16/2023 with infected new sacral decubitus ulcer with possible necrotizing fasciitis.  Underwent I&D (2/17) with necrotizing tissue down to the sacrum and therefore with osteomyelitis.       Course has been complicated by malnutrition, limited PO. Currently on trial of NG feeds and watching calorie count as well           1.Severe sepsis secondary to infected stage IV sacral and right greater trochanter decubitus ulcers with sacral osteomyelitis  -zosyn x 4-6 weeks from source control (2/17/23).  PICC placed  -VAC     2.Moderate malnutrition, weight loss, failure to thrive  -review of prior notes regarding EGD, alternative feeds.  Agrees to NG for now, in discussion with Court Ms. Preciado has had a bad experience with G-tube in family and would like to start with NG.  Reviewed that NG is short term.  If needs alternative feeding for longer period Ms. Preciado will need an NG.    -NG placed 3/9/23 and started TF.    -Ideally oral intake will improve and will not need tube feeds.  Keep TF at 40/hr (about half goal) and continue to monitor calorie counts and encourage PO intake.  Still monitor calorie count now. Still not clear if will need PEG or GJ  -monitor Mg, phos, K for refeeding.     3.Type II DM : HgbA1c on 1/3/2023 was 7.6 % and multiple hypoglycemic episodes in setting of limited PO  -Home regimen: lantus 20 units QAM and Novolog sliding scale. PTA Victoza and metformin on hold.  -Inpatient:  Lantus --increased  to 25 units, sliding scale insulin, add carb counting with meals.       4.HTN: metoprolol.     5.History of CAD:  Metoprolol, lipitor, asa      6.Goal of care:   -reviewed prior note from Dr. Miranda  outlining issues.  -Honoring choices filled out nominating truman Yun as surrogate if needed for decisions as of 3/7/23.     7.Acute blood loss anemia: Hgb was 5.7 after surgery. Transfused 2 units of packed RBCs.  Hemoglobin has been stable.  -today hgb 7.3     8.Hyponatremia, hypokalemia, hypomag     9.hypercalcemia - mild on admission and probably from immobility vs. dehydration.  resolved     10.Pressure ulcer on bilateral heels, unstageable: Present on admission. L heel: 4  x 5.5 cm  and R heel: 2  x 2 cm, the skin surface is black.  - Wound care consulted. On offload boots.       11.History of ulcer, GERD: on PPI.     12.Hyperlipidemia: statin.      At 1504 I called truman Yun--mailbox still full-not able to leave message             Diet: Snacks/Supplements Adult: Magic Cup; With Meals  Regular Diet Adult  Snacks/Supplements Adult: Terrence; With Meals  Calorie Counts  Adult Formula Drip Feeding: Continuous Other; Diabetisource AC; Nasogastric tube; Goal Rate: 40; mL/hr; no further advancement for today    DVT Prophylaxis: Enoxaparin (Lovenox) SQ  Cornejo Catheter: Not present  Lines: PRESENT      PICC 02/17/23 Triple Lumen Right Basilic Vascular access-Site Assessment: WDL      Cardiac Monitoring: None  Code Status: Full Code      Clinically Significant Risk Factors            # Hypomagnesemia: Lowest Mg = 1.5 mg/dL in last 2 days, will replace as needed   # Hypoalbuminemia: Lowest albumin = 0.9 g/dL at 2/23/2023  7:22 AM, will monitor as appropriate          # DMII: A1C = 7.6 % (Ref range: <5.7 %) within past 6 months    # Moderate Malnutrition: based on nutrition assessment        Disposition Plan     Expected Discharge Date: 03/15/2023    Discharge Delays: Placement - LTC  Destination: long-term care facility  Discharge Comments: needs tube feeding, EGD 3/1 in OR  wants new LTC  care conference          Milagro Hoyt MD  Hospitalist Service  [facility]  Securely  message with Maria De Jesus (more info)  Text page via [facility] Paging/Directory   ______________________________________________________________________    Interval History   She notes tolerated TF so far and eating some  No pain complaints  Wants to turn on other side  stooling ok    Physical Exam   Vital Signs: Temp: 98.5  F (36.9  C) Temp src: Oral BP: (!) 143/66 Pulse: 80   Resp: 18 SpO2: 100 % O2 Device: None (Room air)    Weight: 160 lbs 14.4 oz    Constitutional: awake, fatigued, alert, cooperative and no apparent distress  Eyes: sclera clear  Respiratory: No increased work of breathing, good air exchange, clear to auscultation bilaterally, no crackles or wheezing  Cardiovascular: Normal apical impulse, regular rate and rhythm, normal S1 and S2, no S3 or S4, and no murmur noted  GI: normal bowel sounds, soft, non-distended and non-tender  Skin: no bruising or bleeding  Musculoskeletal: no lower extremity pitting edema present  Neurologic: Mental Status Exam:  Level of Alertness:   awake  Neuropsychiatric: General: normal, calm and normal eye contact    Medical Decision Making       35 MINUTES SPENT BY ME on the date of service doing chart review, history, exam, documentation & further activities per the note.      Data     I have personally reviewed the following data over the past 24 hrs:    N/A  \   7.3 (L)   / N/A     137 107 13.4 /  355 (H)   4.4 25 0.57 \       Imaging results reviewed over the past 24 hrs:   No results found for this or any previous visit (from the past 24 hour(s)).

## 2023-03-11 NOTE — PLAN OF CARE
Problem: Electrolyte Imbalance  Goal: Electrolyte Imbalance: Plan of Care  Outcome: Progressing     Problem: Enteral Nutrition  Goal: Absence of Aspiration Signs and Symptoms  Outcome: Progressing     Problem: Malnutrition  Goal: Improved Nutritional Intake  Outcome: Progressing   Goal Outcome Evaluation:  Problem: Wound Healing Progression  Goal: Optimal Wound Healing  Intervention: Promote Wound Healing  Recent Flowsheet Documentation  Taken 3/10/2023 1630 by Diana Alford, RN  Pain Management Interventions: medication (see MAR) Pt  denies pain, wound VAC intact, , 332 and 298 covered with scheduled insulin. Pt has a TF (NG) in progress at 40ml/hr and tolerated well. Pt is on protocol mag and K+ Am draw and phosp 1.6 and replacement infusing.

## 2023-03-11 NOTE — PLAN OF CARE
Problem: Fall Injury Risk  Goal: Absence of Fall and Fall-Related Injury  Intervention: Promote Injury-Free Environment  Recent Flowsheet Documentation  Taken 3/11/2023 0830 by Emmy Ren, RN  Safety Promotion/Fall Prevention: activity supervised     Problem: Hypertension Acute  Goal: Blood Pressure Within Desired Range  Intervention: Normalize Blood Pressure  Recent Flowsheet Documentation  Taken 3/11/2023 0830 by Emmy Ren, RN  Medication Review/Management: medications reviewed     Problem: Plan of Care - These are the overarching goals to be used throughout the patient stay.    Goal: Optimal Comfort and Wellbeing  Intervention: Provide Person-Centered Care  Recent Flowsheet Documentation  Taken 3/11/2023 0830 by Emmy Ren, RN  Trust Relationship/Rapport: care explained   Goal Outcome Evaluation:       Patient alert and oriented, complain of pain on her buttocks where wound vac present prn oxycodone and robaxin give for comfort. She was turned and repositioned. Tube feeding formula now changed to Diabetisource running at 40 ml/hr.

## 2023-03-12 NOTE — PROGRESS NOTES
CALORIE COUNT      Approximate Oral Intake for:  3/11  Calories: 956  Protein: 73 grams    TF + PO = 2108 kcal, 131 g protein      Number of Meals Recorded: 3    Number of Snacks Recorded: 0      TF providing:     Type of Feeding Tube: NG  Enteral Frequency:  Continuous  Enteral Regimen: Diabetisource AC @ 40 mL/hr   Total Enteral Provisions: 960 mL/day, 1152 kcal, 96 g CHO, 58 g protein, 57 g fat, 14 g fiber, 784 mL free water  Free Water Flush: 60 mL q 4 hours         Estimated Needs:    Dosing Weight:  65.6 kg (adjusted wt)    Estimated Energy Needs: 1518-8255 kcals/day (25 - 30 kcals/kg)  Justification: Maintenance  Estimated Protein Needs: 79-99 grams protein/day (1.2 - 1.5 grams of pro/kg)  Justification: Wound healing  Estimated Fluid Needs: 1968 mL/day ({30 ml/Kg)        Summary:   3-day summary to follow. TF adjustments per kcal count results to follow   Pt declined Terrence yesterday  Met 100% of estimated nutrition needs yesterday  PO intake alone met 58% of min energy needs and 92% of min protein needs     Jen Matute, RD, LD

## 2023-03-12 NOTE — PLAN OF CARE
Problem: Oral Intake Inadequate  Goal: Improved Oral Intake  Outcome: Not Progressing     Problem: Electrolyte Imbalance  Goal: Electrolyte Imbalance: Plan of Care  Outcome: Progressing     Goal Outcome Evaluation:       Pt is an elderly bed bound nursing home resident who presented to the hospital on 2/16 with sepsis from a stage IV sacral and right hip pressure ulcer. I&D performed 2/17. Wound VAC currently in place. Running at -125 mmHg. Dressing changes Mon, Wed and Fri. Large amount of serosanguineous drainage. Eschar to left heel. Bood remains on. Receiving IV abx in the form of Zosyn for osteomyelitis. She has a triple lumen PICC to the right upper arm. Flushed and patent. Unit blood draws. Turn and repo. Pt is noncompliant and resistive to cares. A&O x2. She has a hx of dementia and CVA with new dx of failure to thrive. NG tube placed 3/09 for malnutrition and TF initiated at 40 ml/hr continuous. Residual is 0. She is tolerating a regular diet. Eating small amounts. Calorie counts. Refused offer of fluid intake this shift. Blood sugar was 239 before meal and 219 at hs. Receives s/s, carb coverage and scheduled Lantus insulin. Adjustments made in insulin regimen today. On Mg, phos and K+ protocols to monitor for refeeding syndrome. Recheck in AM. Pure Wick device in place. Patent and draining ezra colored urine. Incontinent of bowel and bladder. Air mattress on bed to promote skin integrity. Received prn Robaxin and Oxycodone at 2135 for complaints of discomfort.

## 2023-03-12 NOTE — PLAN OF CARE
Problem: Mobility Impairment  Goal: Optimal Mobility  Outcome: Progressing  Intervention: Optimize Mobility  Recent Flowsheet Documentation  Taken 3/12/2023 0005 by Court Palmer RN  Activity Management: bedrest  Positioning/Transfer Devices:   pillows   in use     Problem: Mobility Impairment  Goal: Optimal Mobility  Intervention: Optimize Mobility  Recent Flowsheet Documentation  Taken 3/12/2023 0005 by Court Palmer RN  Activity Management: bedrest  Positioning/Transfer Devices:   pillows   in use     Problem: Fall Injury Risk  Goal: Absence of Fall and Fall-Related Injury  Outcome: Progressing  Intervention: Identify and Manage Contributors  Recent Flowsheet Documentation  Taken 3/12/2023 0005 by Court Palmer RN  Medication Review/Management: medications reviewed  Intervention: Promote Injury-Free Environment  Recent Flowsheet Documentation  Taken 3/12/2023 0005 by Court Palmer RN  Safety Promotion/Fall Prevention: bed alarm on     Problem: Fall Injury Risk  Goal: Absence of Fall and Fall-Related Injury  Intervention: Identify and Manage Contributors  Recent Flowsheet Documentation  Taken 3/12/2023 0005 by Court Palmer RN  Medication Review/Management: medications reviewed     Problem: Fall Injury Risk  Goal: Absence of Fall and Fall-Related Injury  Intervention: Promote Injury-Free Environment  Recent Flowsheet Documentation  Taken 3/12/2023 0005 by Court Palmer RN  Safety Promotion/Fall Prevention: bed alarm on     Problem: Electrolyte Imbalance  Goal: Electrolyte Imbalance: Plan of Care  Outcome: Progressing   Goal Outcome Evaluation:       Patient denies any pain, just with movement in right hip. Wound vac on right hip draining serosanguineous fluid. Iv antibiotic for sepsis do to stage 4 decubitus ulcer. Tolerating NG tube feeding, purwik in place with good output. Blood sugar 226 at 0152. Patient repositioned every 2-3 hours. Left off loading boot for heal wound. Patient  diaoriented to time and situation.

## 2023-03-12 NOTE — PLAN OF CARE
Problem: Fall Injury Risk  Goal: Absence of Fall and Fall-Related Injury  Intervention: Identify and Manage Contributors  Recent Flowsheet Documentation  Taken 3/12/2023 0830 by Emmy Ren RN  Medication Review/Management: medications reviewed     Problem: Risk for Delirium  Goal: Improved Behavioral Control  Intervention: Prevent and Manage Agitation  Recent Flowsheet Documentation  Taken 3/12/2023 0830 by Emmy Ren RN  Environment Familiarity/Consistency: daily routine followed     Problem: Risk for Delirium  Goal: Improved Behavioral Control  Intervention: Minimize Safety Risk  Recent Flowsheet Documentation  Taken 3/12/2023 0830 by Emmy Ren RN  Enhanced Safety Measures: bed alarm set  Trust Relationship/Rapport:   care explained   reassurance provided   Goal Outcome Evaluation:       Patient denies pain when resting in bed, however complain of pain during cares and turning. Prn oxycodone and robaxin was given for comfort. Hemoglobin low today, she received 1 unit of blood, awaiting recheck Hg. TF @ 40 ml/hr.

## 2023-03-12 NOTE — PROGRESS NOTES
[facility]    Medicine Progress Note - Hospitalist Service    Date of Admission:  2/16/2023    Assessment & Plan   Cristal Preciado is a 72-year-old woman, bedbound nursing home resident with history of sacral decubitus ulcer (present on admission), CVA, dementia, neuropathy, DM2, CKD, CAD, failure to thrive admitted on 2/16/2023 with infected new sacral decubitus ulcer vs possible necrotizing fasciitis.  Underwent I&D (2/17) with necrotizing tissue down to the sacrum and therefore with osteomyelitis.       Hospital course has been complicated by anemia requiring blood transfusion and malnutrition due to poor oral intake; currently on trial of NG feeds with calorie count .      1.Severe sepsis secondary to infected stage IV sacral and right greater trochanter decubitus ulcers with sacral osteomyelitis  -Zosyn x 4-6 weeks from source control (2/17/23).  PICC placed  -VAC     2.Moderate malnutrition, weight loss, failure to thrive  -review of prior notes regarding EGD, alternative feeds.  Per previous providers discussion with Court Ms. Preciado, patient has had a bad experience with G-tube in family and would like to start with NG.       -NG placed 3/9/23 and started TF.    -Ideally oral intake will improve and will not need tube feeds.  Keep TF at 40/hr (about half goal) and continue to monitor calorie counts and encourage PO intake.  Still monitor calorie count now.   -monitor Mg, phos, K for refeeding.     3.Type II DM  HgbA1c on 1/3/2023 was 7.6 % and multiple hypoglycemic episodes in setting of limited PO  -Home regimen: lantus 20 units QAM and Novolog sliding scale. PTA Victoza and metformin on hold.  -Inpatient:  Lantus --increased  to 25 units, sliding scale insulin, add carb counting with meals.       4.HTN: metoprolol.     5.History of CAD:  Metoprolol, lipitor, asa      6.Goal of care:   -Honoring choices filled out nominating daughter Court as surrogate if needed for  decisions as of 3/7/23.     7.Acute blood loss anemia: Hgb was 5.7 after surgery. Transfused 2 units of packed RBCs.  Hemoglobin has been stable.  -On 3/12/2023, hemoglobin dropped to 6.4 for which she was transfused with 1 unit of PRBC.  -Monitor hemoglobin and transfuse as needed     8.Hyponatremia, hypokalemia, hypomagnesemia  Monitor electrolytes and replace as per protocol     9.Hypercalcemia - mild on admission and probably from immobility vs. dehydration.  Resolved     10.Pressure ulcer on bilateral heels, unstageable: Present on admission. L heel: 4  x 5.5 cm  and R heel: 2  x 2 cm, the skin surface is black.  - Wound care consulted. On offload boots.  Regular turning in bed      11.History of ulcer, GERD: on PPI.     12.Hyperlipidemia: statin.    Family update  Unable to reach patient's daughter at this time.  Attempt to leave voice message was unsuccessful as the voicemail was full.  Diet: Snacks/Supplements Adult: Magic Cup; With Meals  Regular Diet Adult  Snacks/Supplements Adult: Terrence; With Meals  Calorie Counts  Adult Formula Drip Feeding: Continuous Other; Diabetisource AC; Nasogastric tube; Goal Rate: 40; mL/hr; no further advancement for today    DVT Prophylaxis: Enoxaparin (Lovenox) SQ  Cornejo Catheter: Not present  Lines: PRESENT      PICC 02/17/23 Triple Lumen Right Basilic Vascular access-Site Assessment: WDL      Cardiac Monitoring: None  Code Status: Full Code      Clinically Significant Risk Factors            # Hypomagnesemia: Lowest Mg = 1.5 mg/dL in last 2 days, will replace as needed   # Hypoalbuminemia: Lowest albumin = 0.9 g/dL at 2/23/2023  7:22 AM, will monitor as appropriate          # DMII: A1C = 7.6 % (Ref range: <5.7 %) within past 6 months    # Moderate Malnutrition: based on nutrition assessment        Disposition Plan      Expected Discharge Date: 03/15/2023    Discharge Delays: Placement - LTC  Destination: long-term care facility  Discharge Comments: IV abx times 6 wks           Johny Yancey MD  Hospitalist Service  [facility]  Securely message with [employer] (more info)  Text page via [employer] Paging/Directory   ______________________________________________________________________    Interval History   No new complaints today.  Hemoglobin dropped to 6.4 overnight.  She was transfused with 1 unit of PRBC.  NG tube in place with tube feeds running.   Physical Exam   Vital Signs: Temp: 97.8  F (36.6  C) Temp src: Oral BP: 131/60 Pulse: 97   Resp: 16 SpO2: 100 % O2 Device: None (Room air)    Weight: 160 lbs 14.4 oz    Constitutional: awake, fatigued, alert, cooperative and no apparent distress  Eyes: sclera clear  Respiratory: No increased work of breathing, good air exchange, clear to auscultation bilaterally, no crackles or wheezing  Cardiovascular: Normal apical impulse, regular rate and rhythm, normal S1 and S2, no S3 or S4, and no murmur noted  GI: normal bowel sounds, soft, non-distended and non-tender  Skin:   Extensive sacral ulcer.        Musculoskeletal: no lower extremity pitting edema present  Neurologic: Mental Status Exam:  Level of Alertness:   Awake.  Functional quadriplegia  Neuropsychiatric: General: normal, calm and normal eye contact    Medical Decision Making       35 MINUTES SPENT BY ME on the date of service doing chart review, history, exam, documentation & further activities per the note.      Data     I have personally reviewed the following data over the past 24 hrs:    N/A  \   6.4 (LL)   / 250     140 105 13.3 /  321 (H)   4.1 26 0.68 \       Imaging results reviewed over the past 24 hrs:   No results found for this or any previous visit (from the past 24 hour(s)).

## 2023-03-13 NOTE — PLAN OF CARE
Problem: Plan of Care - These are the overarching goals to be used throughout the patient stay.    Goal: Absence of Hospital-Acquired Illness or Injury  Intervention: Identify and Manage Fall Risk  Recent Flowsheet Documentation  Taken 3/13/2023 0200 by Estefania Watkins RN  Safety Promotion/Fall Prevention: bed alarm on  Intervention: Prevent Skin Injury  Recent Flowsheet Documentation  Taken 3/13/2023 0515 by Estefania Watkins RN  Body Position:    turned    supine  Taken 3/13/2023 0245 by Estefania Watkins RN  Body Position:    turned    left  Taken 3/13/2023 0200 by Estefania Watkins RN  Body Position:    turned    supine  Goal: Optimal Comfort and Wellbeing  Intervention: Monitor Pain and Promote Comfort  Recent Flowsheet Documentation  Taken 3/13/2023 0227 by Estefania Watkins RN  Pain Management Interventions: medication (see MAR)  Intervention: Provide Person-Centered Care  Recent Flowsheet Documentation  Taken 3/13/2023 0200 by Estefania Watkins RN  Trust Relationship/Rapport:    care explained    emotional support provided     Problem: Enteral Nutrition  Goal: Absence of Aspiration Signs and Symptoms  Outcome: Progressing  Intervention: Minimize Aspiration Risk  Recent Flowsheet Documentation  Taken 3/13/2023 0515 by Estefania Watkins RN  Head of Bed (HOB) Positioning: HOB at 20-30 degrees  Taken 3/13/2023 0245 by Estefania Watkins RN  Head of Bed (HOB) Positioning: HOB at 20-30 degrees  Taken 3/13/2023 0200 by Estefania Watkins RN  Head of Bed (HOB) Positioning: HOB at 20-30 degrees  Goal: Safe, Effective Therapy Delivery  Outcome: Progressing  Goal: Feeding Tolerance  Outcome: Progressing   Goal Outcome Evaluation:       Pt cries and c/o terrible pain any time she is positioned anywhere except her left side. Oxycodone and robaxin given. NG tube clogged. Able to unclog with warm water. TF resumed at 0300. 2 AM . Incontinent loose stool x 1 this shift.

## 2023-03-13 NOTE — PROGRESS NOTES
[facility] Nurse Inpatient Assessment     Consulted for: Buttocks, heels    Patient History (according to provider note(s):      Cristal Preciado is a 72 year old female with PMH significant for known sacral decubitus ulcer (present on admission), CVA, dementia, neuropathy, DM2, CKD, CAD and FTT admitted on 2/16/2023 with infected new sacral decubitus ulcer with possible necrotizing fasciitis.      1. Sepsis- Admit patient. Infected Sacral Decubitus ulcer/Possible Necrotizing fasciits,UTI,Lactic acid= 6.4, WBC= 14.1. CT pelvis report reviewed. ED physician has already been in contact with General Surgeon on call and patient will be seen in consultation. Continue Vancomycin, Zosyn and Clindamycin. Continue IV fluids. Currently NPO. Repeat labs in a.m. Monitor vitals closely. Will make further recommendations based on clinical course.     2. Possible Necrotizing Fasciitis- ED has contacted General Surgeon on call and patient will be seen in consultation. Continue Vancomycin, Zosyn and Clindamycin. Plan per surgeon.    Areas Assessed:      Pressure Injury Location: Sacrum  (additional pictures available in media tab)                                                        2/27, view into the undermining to bottom half of wound       3/6        3/10                                                                    3/13      Wound type: Pressure Injury     Pressure Injury Stage: 4, present on admission   Wound history/plan of care:   Patient admitted with worsening pressure wounds, was taken for an I&D  Wound base: 30 % slough and bone, 70 % dermis, granulation tissue, non-granular tissue and adipose tissue, more granulation today, likely assisted by NG/TF for nutrition.      Palpation of the wound bed: boggy      Drainage: small     Description of drainage: serosanguinous     Measurements (length x width x depth, in cm) 10  x 12  x  3.5 cm      Tunneling N/A     Undermining up to 1.5 cm from  8-11 o'clock  Periwound skin: Intact      Color: normal and consistent with surrounding tissue, slight purple discoloration near gluteal cleft      Temperature: normal   Odor: none  Pain: mild, with palpation and during dressing change  Pain intervention prior to dressing change: slow and gentle cares , PO pain medication given by RN  Treatment goal: Heal , Increase granulation and Protection  STATUS: evolving  Supplies ordered: gathered    My PI Risk Assessment     Sensory Perception: 2 - Very Limited     Moisture: 1 - Constantly moist     Activity: 1 - Bedfast      Mobility: 2 - Very limited     Nutrition: 2 - Probably inadequate      Friction/Shear: 1 - Problem     TOTAL: 9  __________________________________________________________________________________________________________________  Pressure Injury Location: [facility]         3/3     3/6      3/10                                                                       3/13       Wound type: Pressure Injury     Pressure Injury Stage: 4, present on admission   Wound history/plan of care:   See above  Wound base: 30 % slough and bone, 70 % subcutaneous tissue, some granulation starting       Palpation of the wound bed: normal      Drainage: scant     Description of drainage: serosanguinous     Measurements (length x width x depth, in cm) 10  x 13  x  3 cm      Tunneling N/A     Undermining N/A  Periwound skin: Intact      Color: normal and consistent with surrounding tissue      Temperature: normal   Odor: none  Pain: mild,   Pain intervention prior to dressing change: slow and gentle cares   Treatment goal: Heal , Increase granulation and Protection  STATUS: edges are more defined, thinning yellow slough at center over bone  Supplies ordered: ordered additional large dressing and rings   ________________________________________________________________________________________________________________    Negative pressure wound therapy applied to: Sacrum & R  dominique   Last photo: see above  Wound due to: Pressure Injury   Wound history/plan of care:      Surgical date: 2/18/23     Date Negative Pressure Wound Therapy initiated: 2/20/23     Interventions in place: repositioning, pressure redistribution with device or dressing, specialty surface in use, moisture/incontinence management and offloading    Is patient s nutritional status compromised? yes   a. If yes, what interventions are in place? Protein supplements    Reason for initiating vac therapy? Presence of co-morbidities, High risk of infections, Need for accelerated granulation tissue and Prior history of delayed wound healing    Which?of?the?following?co-morbidities?apply? Immobility  a. If diabetic is patient on a diabetic management program? N/A     Is osteomyelitis present in wound? Y  a.  If yes what treatments are in place? N/A    Number of foam pieces removed from a wound (excluding foam for bridge) : 3 black foam to right hip (1 extra to fill the depth at the center of the wound  Verified this matched the number of foam pieces applied last dressing change: no    Number of foam pieces packed into trochanter (excluding foam for bridge) : 1   To sacral wound: 1 black foam and ring along distal edge to prevent intrusion of stool, vashe soak x 10 minutes. Bridge between both wounds    _____________________________________________________________________________________________________________________________________________________________________    Pressure Injury Location: Bilateral heels        2/20 L heel                                                         2/27                                                                           3/13          2/20 R heel                                                         2/27                                                                     3/13      Wound type: Pressure Injury     Pressure Injury Stage: Unstageable, present on admission      Wound  "history/plan of care:   See above  Wound base: 100 % eschar     Palpation of the wound bed: firm      Drainage: none     Description of drainage: none     Measurements (length x width x depth, in cm)   L heel: 4  x 5.5 cm    R heel: 2  x 2 cm      Tunneling N/A     Undermining N/A  Periwound skin: Intact and Scar tissue      Color: pink      Temperature: normal   Odor: none  Pain: absent, none  Pain intervention prior to dressing change: slow and gentle cares   Treatment goal: Maintain (prevention of deterioration) and offload pressure, reduce friction and shear  STATUS: stable   Supplies ordered: supplies stored on unit - no change in care needed        Treatment Plan:     Negative pressure wound therapy plan:  Wound location: Sacrum + R Trocanter    Change Days: Mon/Wed/Fri by WOC RN    Supplies (including all accessories) used: large Black foam , Adapt barrier ring and Cavilon no sting barrier film  Cleanse with Vashe prior to replacing VAC    Suction setting: -125   Methods used: Window paned all periwound skin with vac drape prior to applying sponge and barrier rings around entire sacral wound to protect from stool     Staff RN to assess integrity of dressing and ensure suction is set at appropriate level every shift.   Date canister. Chart canister output every shift. Change cannister weekly and PRN if full/occluded.    Remove foam dressing and replace with BID normal saline moist gauze dressing if:   -a dressing failure which cannot be repaired within 2 hours   -patient is discharging to home without a home pump   -patient is discharging to a facility outside the local area   -if a dressing is a \"Silver Foam\", remove before Radiation Therapy or MRI     The hospital VAC pump is not to be discharged with the patient.?Ensure to disconnect patient from machine prior to discharge. Then,    - If a home KCI VAC pump has been delivered, connect home cannister to dressing tubing then connect cannister to home pump and " turn on machine    - If transferring to a nearby facility with a KCI vac, can disconnect and clamp tubing then cover end with glove so can be reconnected within 2 hours      Heels  1. Paint with betadine daily, allow to dry  2. Offload heels at all times while in bed, utilize prevlon boots    RECOMMEND PRIMARY TEAM ORDER: None, at this time  Education provided: importance of repositioning, wound progress, Infection prevention , Moisture management and Off-loading pressure  Discussed plan of care with: Patient and Nurse  [facility] nurse follow-up plan: Monday/WednesdayFriday  Notify WOC if wound(s) deteriorate.  Nursing to notify the Provider(s) and re-consult the WO Nurse if new skin concern.    DATA:     Current support surface: Standard  Standard gel/foam mattress (IsoFlex, Atmos air, etc)  Containment of urine/stool: Incontinence Protocol  BMI: Body mass index is 25.2 kg/m .   Active diet order: Orders Placed This Encounter      Regular Diet Adult     Output: I/O last 3 completed shifts:  In: 1140 [P.O.:240; NG/GT:580]  Out: 350 [Drains:350]     Labs:   Recent Labs   Lab 03/13/23  0620 03/12/23  0712 03/10/23  1215 03/10/23  0651   ALBUMIN 2.0*  --   --   --    HGB  --  6.4*   < > 7.0*   WBC  --   --   --  4.5    < > = values in this interval not displayed.     Pressure injury risk assessment:   Sensory Perception: 3-->slightly limited  Moisture: 3-->occasionally moist  Activity: 1-->bedfast  Mobility: 2-->very limited  Nutrition: 3-->adequate  Friction and Shear: 1-->problem  Lalit Score: 13    CRIS GomezN RN CWOCN  Pager no longer is use, please contact through [place] group: [facility]

## 2023-03-13 NOTE — PROGRESS NOTES
"Care Management Follow Up    Length of Stay (days): 25    Expected Discharge Date: 03/15/2023     Concerns to be Addressed: discharge planning     Patient plan of care discussed at interdisciplinary rounds: Yes    Anticipated Discharge Disposition: Long Term Care     Anticipated Discharge Services: Other (see comment) (nursing care services, assistance with ADLs, care and supervision)  Anticipated Discharge DME: None    Patient/family educated on Medicare website which has current facility and service quality ratings: yes  Education Provided on the Discharge Plan:    Patient/Family in Agreement with the Plan: yes    Referrals Placed by CM/SW: Post Acute Facilities  Private pay costs discussed: Not applicable    Additional Information:  Chart reviewed.     Social history: \"Patient resides at [facility] where she shares a room with her daughter who is also in LTC.      Patient's family declined for patient to return to [facility]. They did file reports against the facility. Patient was on a bed hold, however that bed hold has now . CM is working on obtaining a new LTC placement for patient. Patient at baseline is a lift transfer, has a wheelchair.\"  Patient's daughter Court is [facility].     LTC referrals pending. SW sent additional LTC referrals. Family prefers facility near [place]. Has wound vac, NG Tube, Palliative following.      Karuna Robles, BSW        " Jorge Contreras is a 70 y.o. female that presents for Follow-Up from Hospital (follow up [facility] urgent care after fall, pt states her back is in alot of pain )        HPI:    ER Follow Up:  Patient presents for ER follow up. Patient recently seen in ED for evaluation and treatment of injuries sustained in a fall. Symptoms prior to presenting to ER: Was on a ladder changing a light bulb and fell off and hit directly on the floor. She was stepping down when she fell    ER Course per ER physician note:      7:14 PM: The patient was seen and evaluated.     Patient was seen and evaluated in the ER after a fall. Patient examined as mentioned above.     CT is negative for any acute process. All other components discussed with the patient.     Patient was advised to take Tylenol as needed for pain. All other findings discussed with the patient. Patient was ambulating in the hallway prior to discharge with no issues. Patient was discharged home in stable condition to follow up with primary care physician. Medications/Treatments at discharge:  none    Clinical course since discharge:  Patient reports that she is still having significant lumbar pain. Has tried tylenol without improvement in pain. She is able to ambulate. Movement does increase her pain. No known palliation other than the norco that was given in the ER. No radiation of the pain or paresthesias of the legs. No bowel or bladder incontinence. She is able to sleep. No diarrhea or constipation.         Health Maintenance   Topic Date Due    Shingles Vaccine (1 of 2 - 2 Dose Series) 05/30/1992    Flu vaccine (1) 09/01/2018    TSH testing  12/03/2018    DTaP/Tdap/Td vaccine (2 - Td) 08/06/2026    DEXA (modify frequency per FRAX score)  Completed    Pneumococcal low/med risk  Completed       Past Medical History:   Diagnosis Date    Anxiety     Arthritis     Bleeding disorder ([facility] [address]     CAD (coronary artery disease)     Stent in 1st diagonal in 2007    Cataract     Celiac disease     Dementia     Depression     Fibromyalgia     Hearing impairment     HTN (hypertension)     Hyperlipemia     MI (myocardial infarction) 2003    Osteoporosis     Pernicious anemia     Pneumonia     Thyroid disease     Urinary incontinence     Vitamin B12 deficiency     Vitamin K deficiency        Past Surgical History:   Procedure Laterality Date    APPENDECTOMY  1963   Mathew Scripture BLADDER REPAIR      x2    CHOLECYSTECTOMY  1963    COLONOSCOPY  2004, 2014, 2015    CORONARY ANGIOPLASTY WITH STENT PLACEMENT  5/2007    HYSTERECTOMY  1964    KNEE ARTHROSCOPY Left 07/19/2016    Dr Ty Moreland HISTORY  5-16-14    Colonoscopy  [address] [facility]    TONSILLECTOMY  as a child     TOTAL KNEE ARTHROPLASTY      UPPER GASTROINTESTINAL ENDOSCOPY  2004       Social History   Substance Use Topics    Smoking status: Never Smoker    Smokeless tobacco: Never Used    Alcohol use No       Family History   Problem Relation Age of Onset    Cancer Mother         liver, lung    Asthma Father          I have reviewed the patient's past medical history, past surgical history, allergies, medications, social and family history and I have made updates where appropriate.     ROS        PHYSICAL EXAM:  /62   Pulse 67   Temp 97.5 °F (36.4 °C) (Oral)   Resp 16   Ht 4' 9.5\" (1.461 m)   Wt 129 lb 14.4 oz (58.9 kg)   BMI 27.62 kg/m²     General Appearance: well developed and well- nourished, in no acute distress  Head: normocephalic and atraumatic  ENT: external ear and ear canal normal bilaterally, nose without deformity, nasal mucosa and turbinates normal without polyps, oropharynx normal, dentition is normal for age, no lip or gum lesions noted  Neck: supple and non-tender without mass, no thyromegaly or thyroid nodules, no cervical lymphadenopathy  Pulmonary/Chest: clear to auscultation bilaterally- no wheezes, rales or rhonchi, normal air movement, no respiratory distress or retractions  Cardiovascular: normal rate, regular rhythm, normal S1 and S2, no murmurs, rubs, clicks, or gallops, distal pulses intact  Extremities: no cyanosis, clubbing or edema of the lower extremities  Psych:  Normal affect without evidence of depression or anxiety, insight and judgement are appropriate, memory appears intact  Skin: warm and dry, no rash or erythema, +tenderness of the midline lumbar spine, 5/5 strength globally in the LEs      ASSESSMENT & PLAN  Frances Gopal was seen today for follow-up from hospital.    Diagnoses and all orders for this visit:    Fall from ladder, initial encounter    Dementia without behavioral disturbance, unspecified dementia type    Recurrent major depressive disorder, in partial remission ([facility] [address]    Lumbar contusion, initial encounter  -     HYDROcodone-acetaminophen (NORCO) 5-325 MG per tablet; Take 1 tablet by mouth every 8 hours as needed for Pain for up to 5 days. Intended supply: 5 days. Take lowest dose possible to manage pain. -CT results reviewed by me today  -Sx are consistent with contusion of the spine  -Advised on avoiding any ladder use in the future  -Will do short Rx for Norco in the short term  -Ice back prn  -Call in 5 days if sx are persisting without improvement  -Patient advised to call immediately or go to ER if any worsening of symptoms      Return if symptoms worsen or fail to improve. Controlled Substances Monitoring: The Prescription Monitoring Report for this patient was reviewed today. Jaime Yen, )    No signs of potential drug abuse or diversion identified. Kylee Rees DO)          Frances Herron received counseling on the following healthy behaviors: medication adherence  Reviewed prior labs and health maintenance. Continue current medications, diet and exercise. Discussed use, benefit, and side effects of prescribed medications. Barriers to medication compliance addressed.    Patient given educational materials - see patient instructions. All patient questions answered. Patient voiced understanding.

## 2023-03-13 NOTE — PLAN OF CARE
Problem: Hyperglycemia  Goal: Blood Glucose Level Within Targeted Range  Outcome: Not Progressing     Problem: Malnutrition  Goal: Improved Nutritional Intake  Outcome: Progressing  Intervention: Promote and Optimize Oral Intake  Recent Flowsheet Documentation  Taken 3/12/2023 1737 by Yeimi Madsen RN  Nutrition Interventions: (calorie counts) other (see comments)     Problem: Oral Intake Inadequate  Goal: Improved Oral Intake  Outcome: Progressing     Goal Outcome Evaluation:          Pt is an elderly bed bound nursing home resident who presented to the hospital on 2/16 with sepsis from a stage IV sacral and right hip pressure ulcer. I&D performed 2/17. Wound VAC currently in place. Running at -125 mmHg. Dressing changes Mon, Wed and Fri. Large amount of serosanguineous drainage requiring cannister to be changed this shift. Eschar to left heel. Heels elevated on pillows.  Receiving IV abx in the form of Zosyn for osteomyelitis. She has a triple lumen PICC to the right upper arm. Flushed and patent. Unit blood draws. Turn and repo. Pt is noncompliant and resistive to cares. A&O x2. She has a hx of dementia and CVA with new dx of failure to thrive. NG tube placed 3/09 for malnutrition and TF initiated at 40 ml/hr continuous. Residual is 0. She is tolerating a regular diet. Ate 100% of meal ordered this shift. On calorie counts. NG tube is clogged. Unable to administer hs meds as pt refused them orally. Also refused all eye drops at hs. MD updated and TF will be held overnight. Blood sugar remains high at 321 before meal and 329 at hs. Receives s/s, carb coverage and scheduled Lantus insulin. On Mg, phos and K+ protocols to monitor for refeeding syndrome. Received IV phos replacement. Otherwise recheck electrolyte levels in the AM. Hgb 6.4 this AM. Pt does have hx of anemia. Received 1 unit of PRBCs. Recheck hgb in the AM. Pure Wick device in place. Patent and draining ezra colored urine. Incontinent of bowel  and bladder. Air mattress on bed to promote skin integrity. Denies pain.

## 2023-03-13 NOTE — PROGRESS NOTES
Hospitalist Progress Note        CODE STATUS:  Full Code    Assessment/Plan:    Cristal Preciado is a 72-year-old woman, bedbound nursing home resident with history of sacral decubitus ulcer (present on admission), CVA, dementia, neuropathy, DM2, CKD, CAD, failure to thrive admitted on 2/16/2023 with infected new sacral decubitus ulcer vs possible necrotizing fasciitis.  She underwent I&D (2/17) with necrotizing tissue down to the sacrum and  with osteomyelitis.       Hospital course has been complicated by anemia requiring blood transfusion and malnutrition due to poor oral intake; currently on trial of NG feeds with calorie count.     Severe sepsis secondary to infected stage IV sacral and right greater trochanter decubitus ulcers with sacral osteomyelitis  - Hemodynamically stable  - Continue Zosyn x 4-6 weeks from source control (2/17/23).  PICC placed.  - Wound VAC  - Nutritional support     Moderate malnutrition, weight loss, failure to thrive  - Review of prior notes regarding EGD, alternative feeds.  Per previous providers discussion with Court MsAngelica Ozzy, patient has had a bad experience with G-tube in family and would like to start with NG.    - NG placed 3/9/23 and started TF.    - Ideally oral intake will improve and will not need tube feeds.  Keep TF at 40/hr (about half goal) and continue to monitor calorie counts and encourage PO intake.  Still monitor calorie count now.   - Monitor Mg, phos, K for refeeding.     Acute blood loss anemia: Hgb was 5.7 after surgery. Transfused 2 units of packed RBCs.  Hemoglobin has been stable.  - On 3/12/2023, hemoglobin dropped to 6.4 for which she was transfused with 1 unit of PRBC.  - Repeat level today is pending  - Monitor hemoglobin and transfuse as needed    Type II DM  Hyperglycemia  - HgbA1c on 1/3/2023 was 7.6 % and multiple hypoglycemic episodes in setting of limited PO  - Lantus increased to 30 units QAM today. I will bedtime lantus for additional  coverage and Novolog sliding scale.   - Discontinue carb count  - PTA Victoza and metformin on hold.  -Inpatient:  Lantus --increased  to 25 units, sliding scale insulin, add carb counting with meals.       HTN  - Cont Metoprolol     History of CAD  - Continue Metoprolol, lipitor, asa      Goal of care:   - Honoring choices filled out nominating daughter Court as surrogate if needed for decisions as of 3/7/23.      Hyponatremia, hypokalemia, hypomagnesemia  - Monitor electrolytes and replace as per protocol     Hypercalcemia   - Mild on admission and probably from immobility vs. dehydration.  Resolved     Pressure ulcer on bilateral heels, unstageable: Present on admission. L heel: 4  x 5.5 cm  and R heel: 2  x 2 cm, the skin surface is black.  - Wound care consulted. On offload boots.  Regular turning in bed     History of ulcer, GERD  - Cont PTA PPI     Hyperlipidemia  - Cont Lipitor      DVT Prophylaxis: Lovenox    Disposition/Barrier to discharge; Not medically ready. Issues still needing to be resolved: Nutrition, acute on chronic anemia, wound care. Will likely need facility discharge.      Subjective:  Interval History:   Patient seen and examined.   No events overnight.  She denies any issues. Finished all of her breakfast.    Review of Systems:   As mentioned in subjective.    Patient Active Problem List   Diagnosis     Cataract     CAD (coronary artery disease)     Diabetic nephropathy (H)     Diabetic neuropathy (H)     Diabetic retinopathy (H)     GERD (gastroesophageal reflux disease)     Glaucoma     Hypertension     Hyperlipidemia     H/O: stroke     Anemia     Seasonal allergies     Depression     Tubular adenoma of colon     Leg weakness     Dermatitis, seborrheic     Eczematous dermatitis     History of coronary artery disease     Venous stasis dermatitis of both lower extremities     Chronic dermatitis of hands     Neoplasm of uncertain behavior of skin     S/P hysterectomy     Hypomagnesemia      Gout     DM type 2 w Neuro/Retin/Nephr -- hgb A1C 7.6 on 1/3/23     Stage 3 chronic kidney disease, unspecified whether stage 3a or 3b CKD (H)     Acute chest pain     Chronic kidney disease, stage 2 (mild)     Acute metabolic encephalopathy     Oropharyngeal dysphagia     Sacral decubitus ulcer     Dehydration     Confusion     Adult failure to thrive     Acute kidney injury (H)     Cognitive impairment Consistent with Mild Dementia     Polyneuropathy associated with underlying disease (H)     Acute cystitis with hematuria     Delirium     Protein-calorie malnutrition (H)     Wound infection     Sacral wound, initial encounter     Severe sepsis (H)     Hypokalemia     Hypercalcemia     Anemia due to blood loss, acute     Pressure injury of contiguous region involving buttock and hip, stage 4 (H)     Necrotizing fasciitis (H)     Hypoglycemia     Pressure ulcer of both heels, unstageable (H)       Scheduled Meds:    aspirin  81 mg Oral or NG Tube Daily     atorvastatin  80 mg Oral or NG Tube QPM     diclofenac  2 g Topical 4x Daily     dorzolamide-timolol  1 drop Both Eyes BID     DULoxetine  90 mg Oral or NG Tube QAM     enoxaparin ANTICOAGULANT  30 mg Subcutaneous Q24H     insulin aspart   Subcutaneous TID w/meals     insulin aspart  1-10 Units Subcutaneous TID AC     insulin aspart  1-7 Units Subcutaneous At Bedtime     insulin glargine  30 Units Subcutaneous QAM AC     Terrence  1 packet Oral BID w/meals     latanoprost  1 drop Both Eyes At Bedtime     magnesium sulfate  4 g Intravenous Once     metoprolol tartrate  50 mg Oral or NG Tube BID     mirtazapine  15 mg Oral or NG Tube At Bedtime     olopatadine  1 drop Both Eyes Daily     pantoprazole  40 mg Oral or NG Tube BID AC     piperacillin-tazobactam  3.375 g Intravenous Q8H     polyvinyl alcohol  1 drop Both Eyes TID     potassium & sodium phosphates  1 packet Oral or Feeding Tube Q4H     sodium chloride (PF)  10-30 mL Intracatheter Q8H     sodium chloride  (PF)  3 mL Intracatheter Q8H     thiamine  100 mg Oral or NG Tube Daily     traZODone  50 mg Oral or NG Tube At Bedtime     Continuous Infusions:    - MEDICATION INSTRUCTIONS -       PRN Meds:.sodium chloride 0.9%, acetaminophen, glucose **OR** dextrose **OR** glucagon, glucose **OR** dextrose **OR** glucagon, lidocaine 4%, lidocaine, lidocaine (buffered or not buffered), melatonin, methocarbamol, naloxone **OR** naloxone **OR** naloxone **OR** naloxone, nitroGLYcerin, oxyCODONE, - MEDICATION INSTRUCTIONS -, senna-docusate, simethicone, sodium chloride (PF)    Objective:  Vital signs in last 24 hours:  Temp:  [97.8  F (36.6  C)-99  F (37.2  C)] 98.4  F (36.9  C)  Pulse:  [76-99] 88  Resp:  [15-18] 18  BP: (103-138)/(36-72) 138/64  SpO2:  [97 %-100 %] 98 %      Physical Exam:  General: In NAD  HEENT: NCAT & EOMI. +NGT  CV: Normal S1S2, regular rhythm, normal rate  Lungs: CTAB  Abdomen: Soft, NT, ND, +BS  Extremities: No LE edema b/l  Neuro: AAOx3  Psych: Normal Affect    Lab Results:    Recent Results (from the past 24 hour(s))   Glucose by meter    Collection Time: 03/12/23 12:36 PM   Result Value Ref Range    GLUCOSE BY METER POCT 339 (H) 70 - 99 mg/dL   Phosphorus    Collection Time: 03/12/23  2:10 PM   Result Value Ref Range    Phosphorus 2.2 (L) 2.5 - 4.5 mg/dL   Glucose by meter    Collection Time: 03/12/23  5:01 PM   Result Value Ref Range    GLUCOSE BY METER POCT 321 (H) 70 - 99 mg/dL   Glucose by meter    Collection Time: 03/12/23  9:40 PM   Result Value Ref Range    GLUCOSE BY METER POCT 339 (H) 70 - 99 mg/dL   Glucose by meter    Collection Time: 03/13/23  1:58 AM   Result Value Ref Range    GLUCOSE BY METER POCT 267 (H) 70 - 99 mg/dL   Phosphorus    Collection Time: 03/13/23  6:20 AM   Result Value Ref Range    Phosphorus 2.6 2.5 - 4.5 mg/dL   Magnesium    Collection Time: 03/13/23  6:20 AM   Result Value Ref Range    Magnesium 1.4 (L) 1.7 - 2.3 mg/dL   Potassium    Collection Time: 03/13/23  6:20 AM    Result Value Ref Range    Potassium 4.0 3.4 - 5.3 mmol/L   CRP inflammation    Collection Time: 03/13/23  6:20 AM   Result Value Ref Range    CRP Inflammation 60.40 (H) <5.00 mg/L   Hepatic panel    Collection Time: 03/13/23  6:20 AM   Result Value Ref Range    Protein Total 5.4 (L) 6.4 - 8.3 g/dL    Albumin 2.0 (L) 3.5 - 5.2 g/dL    Bilirubin Total 0.2 <=1.2 mg/dL    Alkaline Phosphatase 248 (H) 35 - 104 U/L    AST 60 (H) 10 - 35 U/L    ALT 32 10 - 35 U/L    Bilirubin Direct <0.20 0.00 - 0.30 mg/dL   Glucose by meter    Collection Time: 03/13/23  8:22 AM   Result Value Ref Range    GLUCOSE BY METER POCT 320 (H) 70 - 99 mg/dL       Serum Glucose range:   Recent Labs   Lab 03/13/23  0822 03/13/23  0158 03/12/23  2140 03/12/23  1701   * 267* 339* 321*     ABG: No lab results found in last 7 days.  CBC:   Recent Labs   Lab 03/12/23  0712 03/11/23  0513 03/10/23  1215 03/10/23  0651   WBC  --   --   --  4.5   HGB 6.4* 7.3* 7.9* 7.0*   HCT  --   --   --  24.2*   MCV  --   --   --  96     --   --  159     Chemistry:   Recent Labs   Lab 03/13/23  0620 03/12/23  0712 03/11/23  1419 03/11/23  0513 03/10/23  0808   NA  --  140  --  137 135*   POTASSIUM 4.0 4.1  --  4.4 4.3   CHLORIDE  --  105  --  107 109*   CO2  --  26  --  25 19*   BUN  --  13.3  --  13.4 7.4*   CR  --  0.68  --  0.57 0.62   GFRESTIMATED  --  >90  --  >90 >90   OLAF  --  7.3*  --  7.6* 7.3*   MAG 1.4* 1.8 2.5* 1.5* 1.7   PROTTOTAL 5.4*  --   --   --   --    ALBUMIN 2.0*  --   --   --   --    AST 60*  --   --   --   --    ALT 32  --   --   --   --    ALKPHOS 248*  --   --   --   --    BILITOTAL 0.2  --   --   --   --      Coags:  No results for input(s): INR, PROTIME, PTT in the last 168 hours.    Invalid input(s): APTT  Cardiac Markers:  No results for input(s): CKTOTAL, TROPONINI in the last 168 hours.               03/13/2023   Cyn Madera MD  Hospitalist  Pager: [phone]

## 2023-03-13 NOTE — PROGRESS NOTES
Nutrition Therapy  Enteral Nutrition follow-up        RD Recommendations already implemented  Re-ordered calorie counts x 3 days  Mvi with minerals down FT d/t not meeting RDIs, promote wound healing    RD future recommendation:  If intake continues to improve will decrease TF         Current Nutrition Intake:  Diet: regular diet    Supplement: magic cup bid    Intake: Calorie count - No meal slips saved from yesterday. Ate 100% of breakfast yesterday and 50% of second meal. 3rd meal ordered but intake not documented. Magic cup intake not documented. Estimate intake over 2 meals 516 kcal, 31 g protein with potentially intake of 3rd meal.    -Ate 100% of breakfast and lunch today for a total of 1012  Kcal, 47 g protein so far, including supplements, meeting  60% of estimated kcal and 50% of estimated protein needs so far today.     Pt would not tell me if she was taking these. Upset when asking questions and wanted to be left alone.   Unable to determine total intake yesterday.   Intake does appear to be improving, especially today.      Nutrition Support: TF through NG tube    Diabetisource AC @ 40 mL/hr (960 mL/hr + 2 pkts of Terrence daily, will provide 1312 kcal (20 kcal/kg), 63 g protein (0.95 g pro/kg), 783 ml free water, 104 g CHO, and 14.6 g fiber.   Regimen meets 80% of minimum estimated energy needs and 60% of minimum protein needs    - 60 mL q 4hrs fluid flushes       Weight Trends  Admission wt: 78.1 kg (172 lb 2.9 oz)    Current wt:  72.9 kg (160 lb 14.4 oz) 3/11, down 9 lb from week prior.      Dosing Weight:  65.6 kg (adjusted wt)    ASSESSED NUTRITION NEEDS:  Estimated Energy Needs: 0654-4598 kcals/day (25 - 30 kcals/kg)  Justification: Maintenance  Estimated Protein Needs: 99-131grams protein/day (1.5-2.0grams of pro/kg)  Justification: Wound healing- reassessed 3/13 for increased needs  Estimated Fluid Needs: 1968 mL/day ({30 ml/Kg)   Justification: Maintenance     GI:  BM: 0-4 BM/day. 4 soft  yesterday    Labs:   Magnesium: 1.4 (L), decreased  Phosphorus: 2.6, WNL, improved  Glucose monitorin-339 mg/dL- in poor control- insulin increased today    Medications:   Lipitor, ssi, lantus bid, iv mag replacement, remeron, protonix, iv abx, neutraphos today, thiamine daily    Nutrition Diagnosis  Malnutrition related to poor intake as evidenced by less than 50% food intake since admission (likely poor po intake for a while PTA per conversation).   Evaluation: progressing; started TF on 3/8     Previous Goals  P.O. intake >50% of estimated nutrition needs, or equivalent with supplements. - progressing     INTERVENTIONS  Implementation  Add mvi with minerals down Ft daily to promote wound healing    Re-order calorie counts for another 3 days    Current Goals:  Tolerating TF- met  Glucose <180 mg/dL- not progressing  Renal lab WNL- met   P.o intake > 75% of estimated needs and wean TF - new  Wound healing - progressing    Monitoring/Evaluation  Wound healing, weight, intake, supplement acceptance, labs, bowels

## 2023-03-13 NOTE — PLAN OF CARE
Goal Outcome Evaluation:   Pt ate 100% of breakfast. All eggs and deleon. Also juice. TF continues at 40/hr.

## 2023-03-13 NOTE — PROGRESS NOTES
[facility]    Infectious Disease Progress Note    Date of Service : 03/13/2023     Assessment & Plan   Cristal Preciado is a 72 year old female who was admitted on 2/16/2023.     ASSESSMENT:  1. CVA, sacral decubitus ulceration on admission, status post debridement of sacral ulcer and debridement of right hip ulcer on 2/17/2023  2. Osteomyelitis, both ulcerations go down to bone per operative report  3. Infected decubitus ulcer: Proteus, Enterococcus, E. coli cultures, all sensitive to Zosyn. Tolerating Zosyn well. Active issue.   4. Multiple comorbid conditions: Diabetes, cognitive impairment, sacral wound, coronary artery disease, hypertension, hyperlipidemia    RECOMMENDATIONS:  1. Continue IV Zosyn until 3/17  2. Then augmentin 875-125mg PO BID for 4 more weeks  3. Wound care essential   4. Off-loading of all wounds. Heel needs to be in boot per wound care recommendations  5. ID will sign off. Please call with additional questions or change in clinical status.     Barbie Beard MD  [facility]  Direct messaging: [employer]  On-Call ID provider: [phone], option: 9  ==================================    Interval History   Eating better. Tolerating antibiotics.     Physical Exam   Temp: 98.6  F (37  C) Temp src: Oral BP: (!) 169/72 Pulse: 68   Resp: 18 SpO2: 95 % O2 Device: None (Room air)    Vitals:    03/01/23 0948 03/04/23 0411 03/11/23 0706   Weight: 68.4 kg (150 lb 12.7 oz) 76.7 kg (169 lb 1.5 oz) 73 kg (160 lb 14.4 oz)     Vital Signs with Ranges  Temp:  [97.8  F (36.6  C)-98.6  F (37  C)] 98.6  F (37  C)  Pulse:  [68-99] 68  Resp:  [16-18] 18  BP: (116-169)/(56-72) 169/72  SpO2:  [95 %-100 %] 95 %    Constitutional: No apparent distress  Lungs: normal breathing pattern, no crackles or wheezing  Cardiovascular: Regular rate and rhythm, S1 S2  Abdomen: Slightly distended  Skin: warm  Ulcerations, see photos above decubitus - wound vac in place. Left  heel eschar.  Neuro: deconditioned, CVA    Media Information  Document Information    Other:  Photograph      02/16/2023 7:15 PM   Attached To:   Hospital Encounter on 2/16/23     Source Information    Waylon Singer MD [facility]     Media Information  Document Information    Other:  Photograph      02/16/2023 7:14 PM   Attached To:   Hospital Encounter on 2/16/23     Source Information    Waylon Singer MD [facility]           3/6      Medications     - MEDICATION INSTRUCTIONS -         aspirin  81 mg Oral or NG Tube Daily     atorvastatin  80 mg Oral or NG Tube QPM     diclofenac  2 g Topical 4x Daily     dorzolamide-timolol  1 drop Both Eyes BID     DULoxetine  90 mg Oral or NG Tube QAM     enoxaparin ANTICOAGULANT  30 mg Subcutaneous Q24H     insulin aspart  1-10 Units Subcutaneous TID AC     insulin aspart  1-7 Units Subcutaneous At Bedtime     insulin glargine  15 Units Subcutaneous At Bedtime     insulin glargine  30 Units Subcutaneous QAM AC     Terrence  1 packet Oral BID w/meals     latanoprost  1 drop Both Eyes At Bedtime     metoprolol tartrate  50 mg Oral or NG Tube BID     mirtazapine  15 mg Oral or NG Tube At Bedtime     olopatadine  1 drop Both Eyes Daily     pantoprazole  40 mg Oral or NG Tube BID AC     piperacillin-tazobactam  3.375 g Intravenous Q8H     polyvinyl alcohol  1 drop Both Eyes TID     sodium chloride (PF)  10-30 mL Intracatheter Q8H     sodium chloride (PF)  3 mL Intracatheter Q8H     thiamine  100 mg Oral or NG Tube Daily     traZODone  50 mg Oral or NG Tube At Bedtime       Data   All microbiology laboratory data reviewed.  Recent Labs   Lab Test 03/13/23  1158 03/12/23  0712 03/11/23  0513 03/10/23  1215 03/10/23  0651 03/06/23  0452 03/03/23  0645 03/02/23  0517   WBC  --   --   --   --  4.5  --  5.0 5.1   HGB 8.0* 6.4* 7.3*   < > 7.0*  --  8.4* 8.0*   HCT 25.8*  --   --   --  24.2*  --  28.0* 26.2*   MCV  --   --   --   --  96  --  94 93   PLT  --  250  --    --  159 267 248 242    < > = values in this interval not displayed.     Recent Labs   Lab Test 03/12/23  0712 03/11/23  0513 03/10/23  0808   CR 0.68 0.57 0.62     Recent Labs   Lab Test 02/16/23  1431   *     Recent Labs   Lab Test 08/06/19  0835 04/29/15  1420   CULT No growth No growth after 4 weeks       MICROBIOLOGY:    Reviewed   Contains abnormal data Wound Aerobic Bacterial Culture Routine  Order: [id number]   Collected 2/16/2023  2:32 PM      Status: Preliminary result      Visible to patient: No (not released)     Specimen Information: Leg, Right; Wound    0 Result Notes  Culture Culture in progress       2+ Proteus mirabilis Abnormal        1+ Enterococcus faecalis Abnormal        1+ Escherichia coli Abnormal     Preliminary result, confirmation pending.   4+ Normal brittani            Resulting Agency: [facility]     Susceptibility     Proteus mirabilis Enterococcus faecalis     CARLA CARLA     Ampicillin <=2 ug/mL Susceptible <=2 ug/mL Susceptible     Ampicillin/ Sulbactam <=2 ug/mL Susceptible       Cefepime <=1 ug/mL Susceptible       Ceftazidime <=1 ug/mL Susceptible       Ceftriaxone <=1 ug/mL Susceptible       Ciprofloxacin <=0.25 ug/mL Susceptible       Gentamicin <=1 ug/mL Susceptible       Gentamicin Synergy   Susceptible... Susceptible 1     Levofloxacin <=0.12 ug/mL Susceptible       Meropenem <=0.25 ug/mL Susceptible       Penicillin   4 ug/mL Susceptible     Piperacillin/Tazobactam <=4 ug/mL Susceptible       Tobramycin <=1 ug/mL Susceptible       Trimethoprim/Sulfamethoxazole <=1/19 ug/mL Susceptible       Vancomycin   2 ug/mL Susceptible                1 No high level gentamicin resistance found - therefore combination therapy with an aminoglycoside may be indicated for serious enterococcal infections such as bacteremia and endocarditis.            Specimen Collected: 02/16/23  2:32 PM Last Resulted: 02/19/23  3:21 PM           Abscess Aerobic Bacterial Culture Routine  Order:  [id number]   Collected 2/17/2023  8:45 PM      Status: Preliminary result      Visible to patient: No (not released)     Specimen Information: Hip, Right; Abscess    0 Result Notes  Culture 2+ Escherichia coli Abnormal        2+ Proteus mirabilis Abnormal        1+ Enterococcus faecalis Abnormal             Resulting Agency: [facility]     Susceptibility     Escherichia coli Proteus mirabilis Enterococcus faecalis     CARLA CARLA CARLA     Ampicillin <=2 ug/mL Susceptible <=2 ug/mL Susceptible <=2 ug/mL Susceptible     Ampicillin/ Sulbactam <=2 ug/mL Susceptible <=2 ug/mL Susceptible       Cefepime <=1 ug/mL Susceptible <=1 ug/mL Susceptible       Ceftazidime <=1 ug/mL Susceptible <=1 ug/mL Susceptible       Ceftriaxone <=1 ug/mL Susceptible <=1 ug/mL Susceptible       Ciprofloxacin <=0.25 ug/mL Susceptible <=0.25 ug/mL Susceptible       Gentamicin <=1 ug/mL Susceptible <=1 ug/mL Susceptible       Gentamicin Synergy     Susceptible... Susceptible 1     Levofloxacin <=0.12 ug/mL Susceptible <=0.12 ug/mL Susceptible       Meropenem <=0.25 ug/mL Susceptible <=0.25 ug/mL Susceptible       Penicillin     4 ug/mL Susceptible     Piperacillin/Tazobactam <=4 ug/mL Susceptible <=4 ug/mL Susceptible       Tobramycin <=1 ug/mL Susceptible <=1 ug/mL Susceptible       Trimethoprim/Sulfamethoxazole <=1/19 ug/mL Susceptible <=1/19 ug/mL Susceptible       Vancomycin     2 ug/mL Susceptible                  1 No high level gentamicin resistance found - therefore combination therapy with an aminoglycoside may be indicated for serious enterococcal infections such as bacteremia and endocarditis.            Specimen Collected: 02/17/23  8:45 PM Last Resulted: 02/20/23 12:42 AM                  7-Day Micro Results     No results found for the last 168 hours.           RADIOLOGY:    Reviewed  CT Pelvis Soft Tissue w Contrast    Result Date: 2/16/2023  EXAM: CT PELVIS SOFT TISSUE W CONTRAST LOCATION: [facility] DATE/TIME:  2/16/2023 6:31 PM INDICATION: Malodorous wounds to left buttock and right hip, severe sepsis on lab testing today COMPARISON: 1/3/2023 TECHNIQUE: CT scan of the pelvis was performed with IV contrast. Multiplanar reformats were obtained. Dose reduction techniques were used. CONTRAST: 100ml isovue 370 FINDINGS: Mild motion artifact. PELVIC ORGANS: Increased circumferential bladder wall thickening measuring up to 0.8 cm is consistent with cystitis. Hysterectomy. Atherosclerotic vascular disease. MUSCULOSKELETAL: A new sacral decubitus ulcer with subcutaneous emphysema extending into the surrounding soft tissues with associated inflammatory changes. A new decubitus ulcer at the right greater trochanter with surrounding inflammatory changes. No loculated drainable fluid collection. No aggressive osseous erosion. Degenerative changes of the spine and hips.     IMPRESSION: 1.  New sacral decubitus ulcer with subcutaneous emphysema extending into the surrounding soft tissues. This could represent necrotizing fasciitis. 2.  A new right greater trochanter decubitus ulcer. 3.  Cystitis. [Critical Result: Necrotizing fasciitis] Finding was identified on 2/16/2023 6:35 PM. Dr. Singer was contacted by me on 2/16/2023 6:42 PM and verbalized understanding of the critical result.

## 2023-03-14 NOTE — PLAN OF CARE
Problem: Fall Injury Risk  Goal: Absence of Fall and Fall-Related Injury  Intervention: Promote Injury-Free Environment  Recent Flowsheet Documentation  Taken 3/13/2023 1600 by Emmy Ren RN  Safety Promotion/Fall Prevention: activity supervised     Problem: Hypertension Acute  Goal: Blood Pressure Within Desired Range  Intervention: Normalize Blood Pressure  Recent Flowsheet Documentation  Taken 3/13/2023 1600 by Emmy Ren RN  Sensory Stimulation Regulation: television on     Problem: Plan of Care - These are the overarching goals to be used throughout the patient stay.    Goal: Absence of Hospital-Acquired Illness or Injury  Intervention: Identify and Manage Fall Risk  Recent Flowsheet Documentation  Taken 3/13/2023 1600 by Emmy Ren RN  Safety Promotion/Fall Prevention: activity supervised     Problem: Risk for Delirium  Goal: Improved Behavioral Control  Intervention: Minimize Safety Risk  Recent Flowsheet Documentation  Taken 3/13/2023 1600 by Emmy Ren RN  Enhanced Safety Measures: bed alarm set  Trust Relationship/Rapport: care explained   Goal Outcome Evaluation:       Patient alert, complain of pain on buttocks, prn oxycodone give per orders and was effective. Patient turned and repositioned for comfort. TF @ 40ml/hr. VSS.

## 2023-03-14 NOTE — PLAN OF CARE
Problem: Fall Injury Risk  Goal: Absence of Fall and Fall-Related Injury  Outcome: Progressing  Intervention: Identify and Manage Contributors  Recent Flowsheet Documentation  Taken 3/14/2023 0100 by Susan Mcdaniels RN  Medication Review/Management: medications reviewed  Intervention: Promote Injury-Free Environment  Recent Flowsheet Documentation  Taken 3/14/2023 0100 by Susan Mcdaniels RN  Safety Promotion/Fall Prevention: bed alarm on     Problem: Electrolyte Imbalance  Goal: Electrolyte Imbalance: Plan of Care  Outcome: Progressing     Problem: Enteral Nutrition  Goal: Absence of Aspiration Signs and Symptoms  Outcome: Progressing  Intervention: Minimize Aspiration Risk  Recent Flowsheet Documentation  Taken 3/14/2023 0500 by Susan Mcdaniels RN  Head of Bed (HOB) Positioning: HOB at 20-30 degrees  Taken 3/14/2023 0100 by Susan Mcdaniels RN  Head of Bed (HOB) Positioning: HOB at 20-30 degrees  Taken 3/14/2023 0059 by Susan Mcdaniels RN  Head of Bed (HOB) Positioning: HOB at 20-30 degrees     Problem: Enteral Nutrition  Goal: Feeding Tolerance  Outcome: Progressing     Problem: Oral Intake Inadequate  Goal: Improved Oral Intake  Outcome: Progressing   Goal Outcome Evaluation:       Pt is A/O x2, PAIN DURING REPOSITIONING, HAS 1 LOOSE MEDIUM BM, PURE WICK ON, PICC has blood return on the red lumen but not very much on the white and grey, mg and phos protocols, wound vac in place draining, reposition frequently, NG tube feeding running at 40.

## 2023-03-14 NOTE — PROGRESS NOTES
Hospitalist Progress Note        CODE STATUS:  Full Code    Assessment/Plan:    Cristal Preciado is a 72-year-old woman, bedbound nursing home resident with history of sacral decubitus ulcer (present on admission), CVA, dementia, neuropathy, DM2, CKD, CAD, failure to thrive admitted on 2/16/2023 with infected new sacral decubitus ulcer vs possible necrotizing fasciitis.  She underwent I&D (2/17) with necrotizing tissue down to the sacrum and  with osteomyelitis.       Hospital course has been complicated by anemia requiring blood transfusion and malnutrition due to poor oral intake; currently on trial of NG feeds with calorie count.     Severe sepsis secondary to infected stage IV sacral and right greater trochanter decubitus ulcers with sacral osteomyelitis  - Hemodynamically stable  - Continue Zosyn x 4-6 weeks from source control (2/17/23).  PICC placed.  - Wound VAC  - Nutritional support     Moderate malnutrition, weight loss, failure to thrive  - Review of prior notes regarding EGD, alternative feeds.  Per previous providers discussion with Court MsAngelica Ozzy, patient has had a bad experience with G-tube in family and would like to start with NG.    - NG placed 3/9/23 and started TF.    - Ideally oral intake will improve and will not need tube feeds.  Keep TF at 40/hr (about half goal) and continue to monitor calorie counts and encourage PO intake.    - Discussed w/ bedside RN today who cared for pt yesterday; she completed 100% of all 3 meals.  - If continues to have 100% completion, will discuss removal of ngt bernabe w/ daughter   - Monitor Mg, phos, K for refeeding.     Acute blood loss anemia: Hgb was 5.7 after surgery. Transfused 2 units of packed RBCs.  Hemoglobin has been stable.  - On 3/12/2023, hemoglobin dropped to 6.4 for which she was transfused with 1 unit of PRBC.  - Hgb 7.6  today  - Monitor hemoglobin and transfuse as needed    Type II DM  Hyperglycemia  - HgbA1c on 1/3/2023 was 7.6 % and  multiple hypoglycemic episodes in setting of limited PO  - Lantus increased to 33 units QAM today. Increase bedtime lantus to 21 units and continue Novolog sliding scale.   - PTA Victoza and metformin on hold.     HTN  - Cont Metoprolol     History of CAD  - Continue Metoprolol, lipitor, asa      Goal of care:   - Honoring choices filled out nominating daughter Court as surrogate if needed for decisions as of 3/7/23.      Hyponatremia, hypokalemia, hypomagnesemia  - Monitor electrolytes and replace as per protocol     Hypercalcemia   - Mild on admission and probably from immobility vs. dehydration.  Resolved     Pressure ulcer on bilateral heels, unstageable: Present on admission. L heel: 4  x 5.5 cm  and R heel: 2  x 2 cm, the skin surface is black.  - Wound care consulted. On offload boots.  Regular turning in bed     History of ulcer, GERD  - Cont PTA PPI     Hyperlipidemia  - Cont Lipitor      DVT Prophylaxis: Lovenox    Disposition/Barrier to discharge; Will discuss removing NGT as her PO intake seems adequate. SW addressing discharge placement.    Subjective:  Interval History:   Patient seen and examined.   No events overnight.  She denies any issues.    Review of Systems:   As mentioned in subjective.    Patient Active Problem List   Diagnosis     Cataract     CAD (coronary artery disease)     Diabetic nephropathy (H)     Diabetic neuropathy (H)     Diabetic retinopathy (H)     GERD (gastroesophageal reflux disease)     Glaucoma     Hypertension     Hyperlipidemia     H/O: stroke     Anemia     Seasonal allergies     Depression     Tubular adenoma of colon     Leg weakness     Dermatitis, seborrheic     Eczematous dermatitis     History of coronary artery disease     Venous stasis dermatitis of both lower extremities     Chronic dermatitis of hands     Neoplasm of uncertain behavior of skin     S/P hysterectomy     Hypomagnesemia     Gout     DM type 2 w Neuro/Retin/Nephr -- hgb A1C 7.6 on 1/3/23      Stage 3 chronic kidney disease, unspecified whether stage 3a or 3b CKD (H)     Acute chest pain     Chronic kidney disease, stage 2 (mild)     Acute metabolic encephalopathy     Oropharyngeal dysphagia     Sacral decubitus ulcer     Dehydration     Confusion     Adult failure to thrive     Acute kidney injury (H)     Cognitive impairment Consistent with Mild Dementia     Polyneuropathy associated with underlying disease (H)     Acute cystitis with hematuria     Delirium     Protein-calorie malnutrition (H)     Wound infection     Sacral wound, initial encounter     Severe sepsis (H)     Hypokalemia     Hypercalcemia     Anemia due to blood loss, acute     Pressure injury of contiguous region involving buttock and hip, stage 4 (H)     Necrotizing fasciitis (H)     Hypoglycemia     Pressure ulcer of both heels, unstageable (H)       Scheduled Meds:    [START ON 3/18/2023] amoxicillin-clavulanate  1 tablet Oral Q12H [facility] (08/20)     aspirin  81 mg Oral or NG Tube Daily     atorvastatin  80 mg Oral or NG Tube QPM     diclofenac  2 g Topical 4x Daily     dorzolamide-timolol  1 drop Both Eyes BID     DULoxetine  90 mg Oral or NG Tube QAM     enoxaparin ANTICOAGULANT  30 mg Subcutaneous Q24H     insulin aspart  1-10 Units Subcutaneous TID AC     insulin aspart  1-7 Units Subcutaneous At Bedtime     insulin glargine  21 Units Subcutaneous At Bedtime     insulin glargine  3 Units Subcutaneous Once     [START ON 3/15/2023] insulin glargine  33 Units Subcutaneous QAM AC     Terrence  1 packet Oral BID w/meals     latanoprost  1 drop Both Eyes At Bedtime     metoprolol tartrate  50 mg Oral or NG Tube BID     mirtazapine  15 mg Oral or NG Tube At Bedtime     multivitamins w/minerals  15 mL Per Feeding Tube Daily     olopatadine  1 drop Both Eyes Daily     pantoprazole  40 mg Oral or NG Tube BID AC     piperacillin-tazobactam  3.375 g Intravenous Q8H     polyvinyl alcohol  1 drop Both Eyes TID     potassium chloride  10 mEq  Intravenous Q1H     sodium chloride (PF)  10-30 mL Intracatheter Q8H     sodium chloride (PF)  3 mL Intracatheter Q8H     thiamine  100 mg Oral or NG Tube Daily     traZODone  50 mg Oral or NG Tube At Bedtime     Continuous Infusions:    - MEDICATION INSTRUCTIONS -       PRN Meds:.sodium chloride 0.9%, acetaminophen, glucose **OR** dextrose **OR** glucagon, glucose **OR** dextrose **OR** glucagon, lidocaine 4%, lidocaine, lidocaine (buffered or not buffered), melatonin, methocarbamol, naloxone **OR** naloxone **OR** naloxone **OR** naloxone, nitroGLYcerin, oxyCODONE, - MEDICATION INSTRUCTIONS -, senna-docusate, simethicone, sodium chloride (PF)    Objective:  Vital signs in last 24 hours:  Temp:  [97.4  F (36.3  C)-98.6  F (37  C)] 98  F (36.7  C)  Pulse:  [68-88] 87  Resp:  [15-18] 18  BP: (126-169)/(61-72) 141/63  SpO2:  [95 %-98 %] 96 %      Physical Exam:  General: In NAD  HEENT: NCAT & EOMI. +NGT  CV: Normal S1S2, regular rhythm, normal rate  Lungs: CTAB  Abdomen: Soft, NT, ND, +BS  Extremities: No LE edema b/l  Neuro: AAOx3    Lab Results:    Recent Results (from the past 24 hour(s))   Glucose by meter    Collection Time: 03/13/23  8:22 AM   Result Value Ref Range    GLUCOSE BY METER POCT 320 (H) 70 - 99 mg/dL   Glucose by meter    Collection Time: 03/13/23 11:38 AM   Result Value Ref Range    GLUCOSE BY METER POCT 232 (H) 70 - 99 mg/dL   Hemoglobin and hematocrit    Collection Time: 03/13/23 11:58 AM   Result Value Ref Range    Hemoglobin 8.0 (L) 11.7 - 15.7 g/dL    Hematocrit 25.8 (L) 35.0 - 47.0 %   Magnesium    Collection Time: 03/13/23  3:55 PM   Result Value Ref Range    Magnesium 1.9 1.7 - 2.3 mg/dL   Phosphorus    Collection Time: 03/13/23  3:55 PM   Result Value Ref Range    Phosphorus 2.3 (L) 2.5 - 4.5 mg/dL   Glucose by meter    Collection Time: 03/13/23  5:04 PM   Result Value Ref Range    GLUCOSE BY METER POCT 255 (H) 70 - 99 mg/dL   Glucose by meter    Collection Time: 03/13/23  8:32 PM   Result  Value Ref Range    GLUCOSE BY METER POCT 245 (H) 70 - 99 mg/dL   Potassium    Collection Time: 03/14/23  5:43 AM   Result Value Ref Range    Potassium 3.8 3.4 - 5.3 mmol/L   Hemoglobin and hematocrit    Collection Time: 03/14/23  5:43 AM   Result Value Ref Range    Hemoglobin 7.6 (L) 11.7 - 15.7 g/dL    Hematocrit 25.2 (L) 35.0 - 47.0 %   Magnesium    Collection Time: 03/14/23  5:43 AM   Result Value Ref Range    Magnesium 1.8 1.7 - 2.3 mg/dL   Phosphorus    Collection Time: 03/14/23  5:43 AM   Result Value Ref Range    Phosphorus 2.7 2.5 - 4.5 mg/dL   Glucose by meter    Collection Time: 03/14/23  7:54 AM   Result Value Ref Range    GLUCOSE BY METER POCT 220 (H) 70 - 99 mg/dL       Serum Glucose range:   Recent Labs   Lab 03/14/23  0754 03/13/23  2032 03/13/23  1704 03/13/23  1138   * 245* 255* 232*     ABG: No lab results found in last 7 days.  CBC:   Recent Labs   Lab 03/14/23  0543 03/13/23  1158 03/12/23  0712 03/10/23  1215 03/10/23  0651   WBC  --   --   --   --  4.5   HGB 7.6* 8.0* 6.4*   < > 7.0*   HCT 25.2* 25.8*  --   --  24.2*   MCV  --   --   --   --  96   PLT  --   --  250  --  159    < > = values in this interval not displayed.     Chemistry:   Recent Labs   Lab 03/14/23  0543 03/13/23  1555 03/13/23  0620 03/12/23  0712 03/11/23  1419 03/11/23  0513 03/10/23  0808   NA  --   --   --  140  --  137 135*   POTASSIUM 3.8  --  4.0 4.1  --  4.4 4.3   CHLORIDE  --   --   --  105  --  107 109*   CO2  --   --   --  26  --  25 19*   BUN  --   --   --  13.3  --  13.4 7.4*   CR  --   --   --  0.68  --  0.57 0.62   GFRESTIMATED  --   --   --  >90  --  >90 >90   OLAF  --   --   --  7.3*  --  7.6* 7.3*   MAG 1.8 1.9 1.4* 1.8   < > 1.5* 1.7   PROTTOTAL  --   --  5.4*  --   --   --   --    ALBUMIN  --   --  2.0*  --   --   --   --    AST  --   --  60*  --   --   --   --    ALT  --   --  32  --   --   --   --    ALKPHOS  --   --  248*  --   --   --   --    BILITOTAL  --   --  0.2  --   --   --   --     < > =  values in this interval not displayed.     Coags:  No results for input(s): INR, PROTIME, PTT in the last 168 hours.    Invalid input(s): APTT  Cardiac Markers:  No results for input(s): CKTOTAL, TROPONINI in the last 168 hours.               03/14/2023   Cyn Madera MD  Hospitalist  Pager: [phone]

## 2023-03-14 NOTE — PLAN OF CARE
Problem: Enteral Nutrition  Goal: Absence of Aspiration Signs and Symptoms  Outcome: Progressing   Goal Outcome Evaluation:       NG tube feed continues at 40/hr. Pt eating 100% of breakfast. Pt refuse to lay on right side. Prefers left side. Right hip and sacrum wound vac patent. [place] in place.

## 2023-03-14 NOTE — PROGRESS NOTES
Care Management Follow Up    Length of Stay (days): 26    Expected Discharge Date: 03/15/2023     Concerns to be Addressed: discharge planning     Patient plan of care discussed at interdisciplinary rounds: Yes    Anticipated Discharge Disposition: Long Term Care     Anticipated Discharge Services: Other (see comment) (nursing care services, assistance with ADLs, care and supervision)  Anticipated Discharge DME: None    Patient/family educated on Medicare website which has current facility and service quality ratings: yes  Education Provided on the Discharge Plan:    Patient/Family in Agreement with the Plan: yes    Referrals Placed by CM/SW: Post Acute Facilities  Private pay costs discussed: Not applicable    Additional Information:  Chart reviewed, LTC referrals pending, [facility] follow up with referrals.  Pt has wound vac and NG tube at this time.      LOR [place]

## 2023-03-14 NOTE — PROGRESS NOTES
CALORIE COUNT      Approximate Oral Intake for:  3/13 over 3 meals. Magic cup (BID) intake not documented  Calories:828   Protein:55      Intake from TF/PN:        Diabetisource AC @ 40 mL/hr (960 mL/hr + 2 pkts of Terrence daily, will provide 1312 kcal (20 kcal/kg), 63 g protein (0.95 g pro/kg), 783 ml free water, 104 g CHO, and 14.6 g fiber.   Regimen meets 80% of minimum estimated energy needs and 60% of minimum protein needs      Estimated Needs:    Calories:7993-6999  Protein:  g      Summary:     P.o intake yesterday met 50%of estimated kcal and 55% of estimated protein intake, not including magic cups.     Meeting 100% of needs with p.o and TF    -Appears magic cups are being sent between meals and not with. Will change to with meals BID

## 2023-03-14 NOTE — PLAN OF CARE
Problem: Oral Intake Inadequate  Goal: Improved Oral Intake  Outcome: Progressing   Goal Outcome Evaluation:       Pt ate 100% breakfast and 75% lunch. Ate lunch late so not wanting to order supper. NG Tube feed re-inserted more as it pulled out some.and abdomen xray ordered to check placement.

## 2023-03-15 NOTE — PLAN OF CARE
Problem: Plan of Care - These are the overarching goals to be used throughout the patient stay.    Goal: Absence of Hospital-Acquired Illness or Injury  Intervention: Identify and Manage Fall Risk  Recent Flowsheet Documentation  Taken 3/15/2023 0130 by Estefania Watkins, RN  Safety Promotion/Fall Prevention: bed alarm on  Intervention: Prevent Skin Injury  Recent Flowsheet Documentation  Taken 3/15/2023 0130 by Estefania Watkins, RN  Body Position:    turned    left  Goal: Optimal Comfort and Wellbeing  Intervention: Provide Person-Centered Care  Recent Flowsheet Documentation  Taken 3/15/2023 0130 by Estefania Watkins, RN  Trust Relationship/Rapport:    care explained    emotional support provided   Goal Outcome Evaluation:       Pt cries with repositioning, but settles quickly and sleeps between cares. Wound vac intact with serosanguinous drng. More painful later in shift. Oxycodone and robaxin given. 2 small incontinent loose stools.

## 2023-03-15 NOTE — PROGRESS NOTES
[facility] Nurse Inpatient Assessment     Consulted for: Buttocks, heels    Patient History (according to provider note(s):      Cristal Preciado is a 72 year old female with PMH significant for known sacral decubitus ulcer (present on admission), CVA, dementia, neuropathy, DM2, CKD, CAD and FTT admitted on 2/16/2023 with infected new sacral decubitus ulcer with possible necrotizing fasciitis.      1. Sepsis- Admit patient. Infected Sacral Decubitus ulcer/Possible Necrotizing fasciits,UTI,Lactic acid= 6.4, WBC= 14.1. CT pelvis report reviewed. ED physician has already been in contact with General Surgeon on call and patient will be seen in consultation. Continue Vancomycin, Zosyn and Clindamycin. Continue IV fluids. Currently NPO. Repeat labs in a.m. Monitor vitals closely. Will make further recommendations based on clinical course.     2. Possible Necrotizing Fasciitis- ED has contacted General Surgeon on call and patient will be seen in consultation. Continue Vancomycin, Zosyn and Clindamycin. Plan per surgeon.    Areas Assessed:      Pressure Injury Location: Sacrum  (additional pictures available in media tab)                                                         3/10                                                                    3/13      Wound type: Pressure Injury     Pressure Injury Stage: 4, present on admission   Wound history/plan of care:   Patient admitted with worsening pressure wounds, was taken for an I&D  Wound base: 30 % slough and bone, 70 % dermis, granulation tissue, non-granular tissue and adipose tissue, more granulation today, likely assisted by NG/TF for nutrition.      Palpation of the wound bed: boggy      Drainage: small     Description of drainage: serosanguinous     Measurements (length x width x depth, in cm) 10  x 12  x  3.5 cm      Tunneling N/A     Undermining up to 1.5 cm from 8-11 o'clock  Periwound skin: Intact      Color: normal and consistent  with surrounding tissue, slight purple discoloration near gluteal cleft      Temperature: normal   Odor: none  Pain: mild, with palpation and during dressing change  Pain intervention prior to dressing change: slow and gentle cares , PO pain medication given by RN  Treatment goal: Heal , Increase granulation and Protection  STATUS: evolving  Supplies ordered: gathered    My PI Risk Assessment     Sensory Perception: 2 - Very Limited     Moisture: 1 - Constantly moist     Activity: 1 - Bedfast      Mobility: 2 - Very limited     Nutrition: 2 - Probably inadequate      Friction/Shear: 1 - Problem     TOTAL: 9  __________________________________________________________________________________________________________________  Pressure Injury Location: [facility]             3/10                                                                       3/13       Wound type: Pressure Injury     Pressure Injury Stage: 4, present on admission   Wound history/plan of care:   See above  Wound base: 30 % slough and bone, 70 % subcutaneous tissue, some granulation starting       Palpation of the wound bed: normal      Drainage: scant     Description of drainage: serosanguinous     Measurements (length x width x depth, in cm) 10  x 13  x  3 cm      Tunneling N/A     Undermining N/A  Periwound skin: Intact      Color: normal and consistent with surrounding tissue      Temperature: normal   Odor: none  Pain: mild,   Pain intervention prior to dressing change: slow and gentle cares   Treatment goal: Heal , Increase granulation and Protection  STATUS: edges are more defined, thinning yellow slough at center over bone  Supplies ordered: ordered additional large dressing and rings   ________________________________________________________________________________________________________________  3/15 - Only the wound vac was assessed today and changed, below assessment remains for continuity    Negative pressure wound therapy applied to:  Sacrum & R trocanter   Last photo: see above  Wound due to: Pressure Injury   Wound history/plan of care:      Surgical date: 2/18/23     Date Negative Pressure Wound Therapy initiated: 2/20/23     Interventions in place: repositioning, pressure redistribution with device or dressing, specialty surface in use, moisture/incontinence management and offloading    Is patient s nutritional status compromised? yes   a. If yes, what interventions are in place? Protein supplements    Reason for initiating vac therapy? Presence of co-morbidities, High risk of infections, Need for accelerated granulation tissue and Prior history of delayed wound healing    Which?of?the?following?co-morbidities?apply? Immobility  a. If diabetic is patient on a diabetic management program? N/A     Is osteomyelitis present in wound? Y  a.  If yes what treatments are in place? N/A    Number of foam pieces removed from a wound (excluding foam for bridge) : 3 black foam to right hip (1 extra to fill the depth at the center of the wound  Verified this matched the number of foam pieces applied last dressing change: no    Number of foam pieces packed into trochanter (excluding foam for bridge) : 1   To sacral wound: 1 black foam and ring along distal edge to prevent intrusion of stool, vashe soak x 10 minutes. Bridge between both wounds    _____________________________________________________________________________________________________________________________________________________________________    Pressure Injury Location: Bilateral heels        2/20 L heel                                                         2/27                                                                           3/13          2/20 R heel                                                         2/27                                                                     3/13      Wound type: Pressure Injury     Pressure Injury Stage: Unstageable, present on admission       "Wound history/plan of care:   See above  Wound base: 100 % eschar     Palpation of the wound bed: firm      Drainage: none     Description of drainage: none     Measurements (length x width x depth, in cm)   L heel: 4  x 5.5 cm    R heel: 2  x 2 cm      Tunneling N/A     Undermining N/A  Periwound skin: Intact and Scar tissue      Color: pink      Temperature: normal   Odor: none  Pain: absent, none  Pain intervention prior to dressing change: slow and gentle cares   Treatment goal: Maintain (prevention of deterioration) and offload pressure, reduce friction and shear  STATUS: stable   Supplies ordered: supplies stored on unit - no change in care needed        Treatment Plan:     Negative pressure wound therapy plan:  Wound location: Sacrum + R Trocanter    Change Days: Mon/Wed/Fri by WOC RN    Supplies (including all accessories) used: large Black foam , Adapt barrier ring and Cavilon no sting barrier film  Cleanse with Vashe prior to replacing VAC    Suction setting: -125   Methods used: Window paned all periwound skin with vac drape prior to applying sponge and barrier rings around entire sacral wound to protect from stool     Staff RN to assess integrity of dressing and ensure suction is set at appropriate level every shift.   Date canister. Chart canister output every shift. Change cannister weekly and PRN if full/occluded.    Remove foam dressing and replace with BID normal saline moist gauze dressing if:   -a dressing failure which cannot be repaired within 2 hours   -patient is discharging to home without a home pump   -patient is discharging to a facility outside the local area   -if a dressing is a \"Silver Foam\", remove before Radiation Therapy or MRI     The hospital VAC pump is not to be discharged with the patient.?Ensure to disconnect patient from machine prior to discharge. Then,    - If a home KCI VAC pump has been delivered, connect home cannister to dressing tubing then connect cannister to home " pump and turn on machine    - If transferring to a nearby facility with a KCI vac, can disconnect and clamp tubing then cover end with glove so can be reconnected within 2 hours      Heels  1. Paint with betadine daily, allow to dry  2. Offload heels at all times while in bed, utilize prevlon boots    RECOMMEND PRIMARY TEAM ORDER: None, at this time  Education provided: importance of repositioning, wound progress, Infection prevention , Moisture management and Off-loading pressure  Discussed plan of care with: Patient and Nurse  [facility] nurse follow-up plan: Monday/WednesdayFriday  Notify [facility] if wound(s) deteriorate.  Nursing to notify the Provider(s) and re-consult the [facility] Nurse if new skin concern.    DATA:     Current support surface: Standard  Standard gel/foam mattress (IsoFlex, Atmos air, etc)  Containment of urine/stool: Incontinence Protocol  BMI: Body mass index is 25.2 kg/m .   Active diet order: Orders Placed This Encounter      Regular Diet Adult     Output: I/O last 3 completed shifts:  In: 2440 [P.O.:300; NG/GT:2120]  Out: 660 [Urine:400; Drains:260]     Labs:   Recent Labs   Lab 03/15/23  0631 03/13/23  1158 03/13/23  0620 03/10/23  1215 03/10/23  0651   ALBUMIN  --   --  2.0*  --   --    HGB 7.9*   < >  --    < > 7.0*   WBC  --   --   --   --  4.5    < > = values in this interval not displayed.     Pressure injury risk assessment:   Sensory Perception: 3-->slightly limited  Moisture: 3-->occasionally moist  Activity: 1-->bedfast  Mobility: 2-->very limited  Nutrition: 3-->adequate  Friction and Shear: 1-->problem  Lalit Score: 13    CRIS GomezN RN CWOCN  Pager no longer is use, please contact through [employer] group: [facility] [employer]

## 2023-03-15 NOTE — PROGRESS NOTES
Hospitalist Progress Note        CODE STATUS:  Full Code    Assessment/Plan:    Cristal Preciado is a 72-year-old woman, bedbound nursing home resident with history of sacral decubitus ulcer (present on admission), CVA, dementia, neuropathy, DM2, CKD, CAD, failure to thrive admitted on 2/16/2023 with infected new sacral decubitus ulcer vs possible necrotizing fasciitis.  She underwent I&D (2/17) with necrotizing tissue down to the sacrum and  with osteomyelitis.       Hospital course has been complicated by anemia requiring blood transfusion and malnutrition due to poor oral intake; currently on trial of NG feeds with calorie count. She has been eating 100% of all three meals a day and so reasonable to discontinue tube feeds.     Severe sepsis secondary to infected stage IV sacral and right greater trochanter decubitus ulcers with sacral osteomyelitis  - Hemodynamically stable  - Continue Zosyn x 4-6 weeks from source control (2/17/23).  PICC placed.  - Wound VAC  - Nutritional support     Moderate malnutrition, weight loss, failure to thrive  - Review of prior notes regarding EGD, alternative feeds.  Per previous providers discussion with Court, Ms. Preciado, patient has had a bad experience with G-tube in family and would like to start with NG.    - NG placed 3/9/23 and started TF.    - She has been completing 100% of all three meals daily. Will discontinue NJ tube and tube feeds.    - Tried to call her daughter, Court, 2x today to update. Did not answer.   - Monitor Mg, phos, K for refeeding.     Acute blood loss anemia: Hgb was 5.7 after surgery. Transfused 2 units of packed RBCs.  Hemoglobin has been stable.  - On 3/12/2023, hemoglobin dropped to 6.4 for which she was transfused with 1 unit of PRBC.  - Hgb 7.9  today  - Monitor hemoglobin and transfuse as needed    Type II DM  Hyperglycemia  - HgbA1c on 1/3/2023 was 7.6 %  - Now that off tube feeds, will resume PTA dose of lantus 20 units w/ breakfast.  Resume PTA metformin.   - Will make adjustments as needed  - Continue Novolog sliding scale.   - PTA Victoza  hold.     HTN  - Cont Metoprolol     History of CAD  - Continue Metoprolol, lipitor, asa      Goal of care:   - Honoring choices filled out nominating daughter Court as surrogate if needed for decisions as of 3/7/23.      Hyponatremia, hypokalemia, hypomagnesemia  - Monitor electrolytes and replace as per protocol     Hypercalcemia   - Mild on admission and probably from immobility vs. dehydration.  Resolved     Pressure ulcer on bilateral heels, unstageable: Present on admission. L heel: 4  x 5.5 cm  and R heel: 2  x 2 cm, the skin surface is black.  - Wound care consulted. On offload boots.  Regular turning in bed     History of ulcer, GERD  - Cont PTA PPI     Hyperlipidemia  - Cont Lipitor      DVT Prophylaxis: Lovenox    Disposition/Barrier to discharge;  SW addressing discharge placement.    Subjective:  Interval History:   Patient seen and examined.   No events overnight.  She denies any issues.  Completed breakfast tray.    Review of Systems:   As mentioned in subjective.    Patient Active Problem List   Diagnosis     Cataract     CAD (coronary artery disease)     Diabetic nephropathy (H)     Diabetic neuropathy (H)     Diabetic retinopathy (H)     GERD (gastroesophageal reflux disease)     Glaucoma     Hypertension     Hyperlipidemia     H/O: stroke     Anemia     Seasonal allergies     Depression     Tubular adenoma of colon     Leg weakness     Dermatitis, seborrheic     Eczematous dermatitis     History of coronary artery disease     Venous stasis dermatitis of both lower extremities     Chronic dermatitis of hands     Neoplasm of uncertain behavior of skin     S/P hysterectomy     Hypomagnesemia     Gout     DM type 2 w Neuro/Retin/Nephr -- hgb A1C 7.6 on 1/3/23     Stage 3 chronic kidney disease, unspecified whether stage 3a or 3b CKD (H)     Acute chest pain     Chronic kidney disease,  stage 2 (mild)     Acute metabolic encephalopathy     Oropharyngeal dysphagia     Sacral decubitus ulcer     Dehydration     Confusion     Adult failure to thrive     Acute kidney injury (H)     Cognitive impairment Consistent with Mild Dementia     Polyneuropathy associated with underlying disease (H)     Acute cystitis with hematuria     Delirium     Protein-calorie malnutrition (H)     Wound infection     Sacral wound, initial encounter     Severe sepsis (H)     Hypokalemia     Hypercalcemia     Anemia due to blood loss, acute     Pressure injury of contiguous region involving buttock and hip, stage 4 (H)     Necrotizing fasciitis (H)     Hypoglycemia     Pressure ulcer of both heels, unstageable (H)       Scheduled Meds:    [START ON 3/18/2023] amoxicillin-clavulanate  1 tablet Oral Q12H [facility] (08/20)     aspirin  81 mg Oral or NG Tube Daily     atorvastatin  80 mg Oral or NG Tube QPM     diclofenac  2 g Topical 4x Daily     dorzolamide-timolol  1 drop Both Eyes BID     DULoxetine  90 mg Oral or NG Tube QAM     enoxaparin ANTICOAGULANT  30 mg Subcutaneous Q24H     insulin aspart  1-10 Units Subcutaneous TID AC     insulin aspart  1-7 Units Subcutaneous At Bedtime     insulin glargine  21 Units Subcutaneous At Bedtime     insulin glargine  33 Units Subcutaneous QAM AC     Terrence  1 packet Oral BID w/meals     latanoprost  1 drop Both Eyes At Bedtime     metoprolol tartrate  50 mg Oral or NG Tube BID     mirtazapine  15 mg Oral or NG Tube At Bedtime     multivitamins w/minerals  15 mL Per Feeding Tube Daily     olopatadine  1 drop Both Eyes Daily     pantoprazole  40 mg Oral or NG Tube BID AC     piperacillin-tazobactam  3.375 g Intravenous Q8H     polyvinyl alcohol  1 drop Both Eyes TID     sodium chloride (PF)  10-30 mL Intracatheter Q8H     sodium chloride (PF)  3 mL Intracatheter Q8H     thiamine  100 mg Oral or NG Tube Daily     traZODone  50 mg Oral or NG Tube At Bedtime     Continuous Infusions:    -  MEDICATION INSTRUCTIONS -       PRN Meds:.sodium chloride 0.9%, acetaminophen, glucose **OR** dextrose **OR** glucagon, glucose **OR** dextrose **OR** glucagon, lidocaine 4%, lidocaine, lidocaine (buffered or not buffered), melatonin, methocarbamol, naloxone **OR** naloxone **OR** naloxone **OR** naloxone, nitroGLYcerin, oxyCODONE, - MEDICATION INSTRUCTIONS -, senna-docusate, simethicone, sodium chloride (PF)    Objective:  Vital signs in last 24 hours:  Temp:  [98.1  F (36.7  C)-98.8  F (37.1  C)] 98.2  F (36.8  C)  Pulse:  [71-82] 71  Resp:  [16-18] 18  BP: (137-159)/(62-70) 143/66  SpO2:  [98 %-100 %] 100 %      Physical Exam:  General: In NAD  HEENT: NCAT & EOMI. +NGT  CV: Normal S1S2, regular rhythm, normal rate  Lungs: CTAB  Abdomen: Soft, NT, ND, +BS  Extremities: No LE edema b/l  Neuro: AAOx3    Lab Results:    Recent Results (from the past 24 hour(s))   Glucose by meter    Collection Time: 03/14/23 11:58 AM   Result Value Ref Range    GLUCOSE BY METER POCT 202 (H) 70 - 99 mg/dL   Glucose by meter    Collection Time: 03/14/23  4:44 PM   Result Value Ref Range    GLUCOSE BY METER POCT 148 (H) 70 - 99 mg/dL   Glucose by meter    Collection Time: 03/14/23  8:58 PM   Result Value Ref Range    GLUCOSE BY METER POCT 175 (H) 70 - 99 mg/dL   Hemoglobin and hematocrit    Collection Time: 03/15/23  6:31 AM   Result Value Ref Range    Hemoglobin 7.9 (L) 11.7 - 15.7 g/dL    Hematocrit 25.5 (L) 35.0 - 47.0 %   Magnesium    Collection Time: 03/15/23  6:31 AM   Result Value Ref Range    Magnesium 1.7 1.7 - 2.3 mg/dL   Potassium    Collection Time: 03/15/23  6:31 AM   Result Value Ref Range    Potassium 4.1 3.4 - 5.3 mmol/L   Phosphorus    Collection Time: 03/15/23  6:31 AM   Result Value Ref Range    Phosphorus 3.0 2.5 - 4.5 mg/dL   Glucose by meter    Collection Time: 03/15/23  7:57 AM   Result Value Ref Range    GLUCOSE BY METER POCT 225 (H) 70 - 99 mg/dL       Serum Glucose range:   Recent Labs   Lab 03/15/23  0757  03/14/23  2058 03/14/23  1644 03/14/23  1158   * 175* 148* 202*     ABG: No lab results found in last 7 days.  CBC:   Recent Labs   Lab 03/15/23  0631 03/14/23  0543 03/13/23  1158 03/12/23  0712 03/10/23  1215 03/10/23  0651   WBC  --   --   --   --   --  4.5   HGB 7.9* 7.6* 8.0* 6.4*   < > 7.0*   HCT 25.5* 25.2* 25.8*  --   --  24.2*   MCV  --   --   --   --   --  96   PLT  --   --   --  250  --  159    < > = values in this interval not displayed.     Chemistry:   Recent Labs   Lab 03/15/23  0631 03/14/23  0543 03/13/23  1555 03/13/23  0620 03/12/23  0712 03/11/23  1419 03/11/23  0513 03/10/23  0808   NA  --   --   --   --  140  --  137 135*   POTASSIUM 4.1 3.8  --  4.0 4.1  --  4.4 4.3   CHLORIDE  --   --   --   --  105  --  107 109*   CO2  --   --   --   --  26  --  25 19*   BUN  --   --   --   --  13.3  --  13.4 7.4*   CR  --   --   --   --  0.68  --  0.57 0.62   GFRESTIMATED  --   --   --   --  >90  --  >90 >90   OLAF  --   --   --   --  7.3*  --  7.6* 7.3*   MAG 1.7 1.8 1.9 1.4* 1.8   < > 1.5* 1.7   PROTTOTAL  --   --   --  5.4*  --   --   --   --    ALBUMIN  --   --   --  2.0*  --   --   --   --    AST  --   --   --  60*  --   --   --   --    ALT  --   --   --  32  --   --   --   --    ALKPHOS  --   --   --  248*  --   --   --   --    BILITOTAL  --   --   --  0.2  --   --   --   --     < > = values in this interval not displayed.     Coags:  No results for input(s): INR, PROTIME, PTT in the last 168 hours.    Invalid input(s): APTT  Cardiac Markers:  No results for input(s): CKTOTAL, TROPONINI in the last 168 hours.               03/15/2023   Cyn Madera MD  Hospitalist  Pager: [phone]

## 2023-03-15 NOTE — PROGRESS NOTES
CALORIE COUNT      Approximate Oral Intake for:  3/14 over 2 meals-no meal tickets saved, 100% of breakfast and lunch per flowsheets (100% of breakfast and 75% of lunch per RN note), did not want dinner d/t late lunch per notes. Magic cup intake not documented    Calories: 708 (if 100% of breakfast and lunch)  Protein: 38 (if 100% of breakfast and lunch)      Intake from TF/PN:        Diabetisource AC @ 40 mL/hr (960 mL/hr + 2 pkts of Terrence daily, will provide 1312 kcal (20 kcal/kg), 63 g protein (0.95 g pro/kg), 783 ml free water, 104 g CHO, and 14.6 g fiber.  Regimen meets 80% of minimum estimated energy needs and 60% of minimum protein needs      Estimated Needs:    Calories:1242-1124  Protein:  g      Summary:     TF + PO = 2020 kcals and 101 gm protein    PO intake yesterday met 43% of estimated kcal and 38% of estimated protein needs, not including magic cups.     Meeting 100% of needs with p.o and TF

## 2023-03-15 NOTE — PROGRESS NOTES
Care Management Follow Up    Length of Stay (days): 27    Expected Discharge Date: 03/16/2023     Concerns to be Addressed: discharge planning     Patient plan of care discussed at interdisciplinary rounds: Yes    Anticipated Discharge Disposition: Long Term Care     Anticipated Discharge Services: Other (see comment) (nursing care services, assistance with ADLs, care and supervision)  Anticipated Discharge DME: None    Patient/family educated on Medicare website which has current facility and service quality ratings: yes  Education Provided on the Discharge Plan:    Patient/Family in Agreement with the Plan: yes    Referrals Placed by CM/SW: Post Acute Facilities  Private pay costs discussed: Not applicable    Additional Information:  Received a call from [facility] ([phone] ext [phone]) asking for where patient will be discharged to. SW explained that we are still looking for facility and will call and update once facility is found.      CRIS MonteiroW

## 2023-03-15 NOTE — PLAN OF CARE
Goal Outcome Evaluation:      Pt is A&Ox2, answers questions appropriately. Unable to move lower ext but has some sensation, remained I bed this shift, repositioned every 2-3 hours. Incont of B&B, loose stool this shiftx1. Wound vac patent and intact, changed by WOC today. Pt oral intake improving daily, NG removed, TF discontinued. Pt takes meds whole in applesauce. VSS, afebrile, labs WNL  Problem: Mobility Impairment  Goal: Optimal Mobility  Outcome: Progressing  Intervention: Optimize Mobility  Recent Flowsheet Documentation  Taken 3/15/2023 1030 by Debbie Kaba, RN  Positioning/Transfer Devices:   pillows   in use  Taken 3/15/2023 0820 by Debbie Kaba, RN  Activity Management: bedrest     Problem: Electrolyte Imbalance  Goal: Electrolyte Imbalance: Plan of Care  Outcome: Progressing     Problem: Oral Intake Inadequate  Goal: Improved Oral Intake  Outcome: Progressing     Problem: Malnutrition  Goal: Improved Nutritional Intake  Outcome: Progressing     Problem: Hyperglycemia  Goal: Blood Glucose Level Within Targeted Range  Outcome: Progressing     Problem: Enteral Nutrition  Goal: Absence of Aspiration Signs and Symptoms  Outcome: Met  Intervention: Minimize Aspiration Risk  Recent Flowsheet Documentation  Taken 3/15/2023 1030 by Debbie Kaba, RN  Head of Bed (HOB) Positioning: HOB at 30 degrees  Goal: Safe, Effective Therapy Delivery  Outcome: Met  Goal: Feeding Tolerance  Outcome: Met

## 2023-03-15 NOTE — PROGRESS NOTES
Date of Admission:  2023  Hospital Day: 27    Pain/ discomfort: prn oxycodone and robaxin given for pain with activity with improvement. Otherwise, denies pain/discomfort.    Problem List:  Wound infection [T14.8XXA, L08.9]  Sacral wound, initial encounter [S31.000A]  Severe sepsis (H) [A41.9, R65.20]   Assessment:  Problem: Electrolyte Imbalance  Goal: Electrolyte Imbalance: Plan of Care  Outcome: Progressing   Goal Outcome Evaluation:  -vitals stable  -XR confirmed correct placement of NGT, TF restarted, length noted at 68cm. No concerns.  -abx per team plan  Progress:  No acute events.    BP (!) 142/63 (BP Location: Left arm)   Pulse 82   Temp 98.1  F (36.7  C) (Oral)   Resp 18   Wt 73 kg (160 lb 14.4 oz)   SpO2 100%   BMI 25.20 kg/m      Respiratory monitoring:   Resp: 18      Cardiac Monitoring: None       I/O last 3 completed shifts:  In: 2350 [P.O.:300; NG/GT:]  Out: -     Wt Readings from Last 2 Encounters:   23 73 kg (160 lb 14.4 oz)   23 62.1 kg (137 lb)        24 Hour Vital Signs Summary:  Temp: 98.1  F (36.7  C) Temp  Min: 98  F (36.7  C)  Max: 98.8  F (37.1  C)  Resp: 18 Resp  Min: 16  Max: 18  SpO2: 100 % SpO2  Min: 96 %  Max: 100 %  Pulse: 82 Pulse  Min: 75  Max: 87    No data recorded  BP: (!) 142/63 Systolic (24hrs), Av , Min:137 , Max:142   Diastolic (24hrs), Av, Min:62, Max:69      Tani Rizzo RN  .................................................... 2023   11:50 PM  [facility]

## 2023-03-16 NOTE — PROGRESS NOTES
CALORIE COUNT      Approximate Oral Intake for:  3/15 over 1 meal (dinner)    Calories: 664  Protein: 47      Was on Diabetisource AC @ 40 mL/hr (960 mL/hr + 2 pkts of Terrence daily, noted was discontinued on 3/15 and NG removed    Estimated Needs:    Calories:6308-3053  Protein:  g      Summary:     Met 40% of kcal and 47% of protein needs from 1 meal on 3/15    Interventions:  Discontinue calorie counts  Continue supplements as ordered  Continue to monitor po intake, weight, labs    Goal: PO intake >50% of estimated nutrition needs, or equivalent with supplements

## 2023-03-16 NOTE — PLAN OF CARE
Problem: Fall Injury Risk  Goal: Absence of Fall and Fall-Related Injury  Outcome: Progressing  Intervention: Identify and Manage Contributors  Recent Flowsheet Documentation  Taken 3/16/2023 0100 by Colt Ortega RN  Medication Review/Management: medications reviewed  Intervention: Promote Injury-Free Environment  Recent Flowsheet Documentation  Taken 3/16/2023 0100 by Colt Ortega, RN  Safety Promotion/Fall Prevention: bed alarm on   Goal Outcome Evaluation:       Patient does not like to be repositioned. Cries out but settles down easily. Wound vac patent.

## 2023-03-16 NOTE — PLAN OF CARE
Goal Outcome Evaluation:       Pt is alert and oriented to herself, but not the situation or time. History of dementia, sometimes noncompliant when taking pills. Does usually take oral meds with applesauce. Pt was incontinent of bowel and bladder, incontinence care was provided. One loose stool occurred during evening shift. There were two occurrences of incontinent urinating. Pt is on K+, Mg+ and phosphate protocols to be drawn tomorrow morning. Wound vac pulled a small amount of drainage from right hip wound. Last blood sugar was over 80, so pt received 10 units of insulin glargine per provider orders. Wounds on heels are scabbed, black, and dry without drainage. Last dose of oxycodone given at 2200, and last Tylenol given at 2130. Bed alarm is on for safety.        Problem: Mobility Impairment  Goal: Optimal Mobility  Intervention: Optimize Mobility  Recent Flowsheet Documentation  Taken 3/15/2023 1657 by Jesica Harper, RN  Activity Management: activity adjusted per tolerance     Problem: Plan of Care - These are the overarching goals to be used throughout the patient stay.    Goal: Absence of Hospital-Acquired Illness or Injury  Intervention: Prevent Skin Injury  Recent Flowsheet Documentation  Taken 3/15/2023 1657 by Jesica Harper, RN  Body Position:   log-rolled   left   right

## 2023-03-16 NOTE — PROGRESS NOTES
Hospitalist Progress Note        CODE STATUS:  Full Code    Assessment/Plan:    Cristal Preciado is a 72-year-old woman, bedbound nursing home resident with history of sacral decubitus ulcer (present on admission), CVA, dementia, neuropathy, DM2, CKD, CAD, failure to thrive admitted on 2/16/2023 with infected new sacral decubitus ulcer vs possible necrotizing fasciitis.  She underwent I&D (2/17) with necrotizing tissue down to the sacrum and  with osteomyelitis.       Hospital course has been complicated by anemia requiring blood transfusion and malnutrition due to poor oral intake; currently on trial of NG feeds with calorie count. She has been eating 100% of all three meals a day and so reasonable to discontinue tube feeds.     Severe sepsis secondary to infected stage IV sacral and right greater trochanter decubitus ulcers with sacral osteomyelitis  - Hemodynamically stable  - Continue Zosyn x 4-6 weeks from source control (2/17/23).  PICC placed.  - Wound VAC  - Nutritional support     Moderate malnutrition, weight loss, failure to thrive  - Review of prior notes regarding EGD, alternative feeds.  Per previous providers discussion with Court, Ms. Preciado, patient has had a bad experience with G-tube in family and would like to start with NG.    - NG placed 3/9/23 and started TF.    - She has been completing 100% of all three meals daily. Will discontinue NJ tube and tube feeds.    - Tried to call her daughter, Court, 2x today to update. Did not answer.   - Monitor Mg, phos, K for refeeding.     Acute blood loss anemia: Hgb was 5.7 after surgery. Transfused 2 units of packed RBCs.  Hemoglobin has been stable.  - On 3/12/2023, hemoglobin dropped to 6.4 for which she was transfused with 1 unit of PRBC.  - Hgb 7.9  today  - Monitor hemoglobin and transfuse as needed    Type II DM  Hyperglycemia  - HgbA1c on 1/3/2023 was 7.6 %  - Now that off tube feeds, will lantus resumed at lower dose. Continue metformin,  twice daily  - Will make adjustments as needed  - Continue Novolog sliding scale.   - PTA Victoza  hold.     HTN  - Cont Metoprolol     History of CAD  - Continue Metoprolol, lipitor, asa      Goal of care:   - Honoring choices filled out nominating daughter Court as surrogate if needed for decisions as of 3/7/23.      Hyponatremia, hypokalemia, hypomagnesemia  - Monitor electrolytes and replace as per protocol     Hypercalcemia   - Mild on admission and probably from immobility vs. dehydration.  Resolved     Pressure ulcer on bilateral heels, unstageable: Present on admission. L heel: 4  x 5.5 cm  and R heel: 2  x 2 cm, the skin surface is black.  - Wound care consulted. On offload boots.  Regular turning in bed     History of ulcer, GERD  - Cont PTA PPI     Hyperlipidemia  - Cont Lipitor      DVT Prophylaxis: Lovenox    Disposition/Barrier to discharge;  SW addressing discharge placement.    Subjective:  Interval History:   Patient seen and examined.   No events overnight.      Review of Systems:   As mentioned in subjective.    Patient Active Problem List   Diagnosis     Cataract     CAD (coronary artery disease)     Diabetic nephropathy (H)     Diabetic neuropathy (H)     Diabetic retinopathy (H)     GERD (gastroesophageal reflux disease)     Glaucoma     Hypertension     Hyperlipidemia     H/O: stroke     Anemia     Seasonal allergies     Depression     Tubular adenoma of colon     Leg weakness     Dermatitis, seborrheic     Eczematous dermatitis     History of coronary artery disease     Venous stasis dermatitis of both lower extremities     Chronic dermatitis of hands     Neoplasm of uncertain behavior of skin     S/P hysterectomy     Hypomagnesemia     Gout     DM type 2 w Neuro/Retin/Nephr -- hgb A1C 7.6 on 1/3/23     Stage 3 chronic kidney disease, unspecified whether stage 3a or 3b CKD (H)     Acute chest pain     Chronic kidney disease, stage 2 (mild)     Acute metabolic encephalopathy      Oropharyngeal dysphagia     Sacral decubitus ulcer     Dehydration     Confusion     Adult failure to thrive     Acute kidney injury (H)     Cognitive impairment Consistent with Mild Dementia     Polyneuropathy associated with underlying disease (H)     Acute cystitis with hematuria     Delirium     Protein-calorie malnutrition (H)     Wound infection     Sacral wound, initial encounter     Severe sepsis (H)     Hypokalemia     Hypercalcemia     Anemia due to blood loss, acute     Pressure injury of contiguous region involving buttock and hip, stage 4 (H)     Necrotizing fasciitis (H)     Hypoglycemia     Pressure ulcer of both heels, unstageable (H)       Scheduled Meds:    [START ON 3/18/2023] amoxicillin-clavulanate  1 tablet Oral Q12H [facility] (08/20)     aspirin  81 mg Oral or NG Tube Daily     atorvastatin  80 mg Oral or NG Tube QPM     diclofenac  2 g Topical 4x Daily     dorzolamide-timolol  1 drop Both Eyes BID     DULoxetine  90 mg Oral or NG Tube QAM     enoxaparin ANTICOAGULANT  30 mg Subcutaneous Q24H     insulin aspart  1-10 Units Subcutaneous TID AC     insulin aspart  1-7 Units Subcutaneous At Bedtime     insulin glargine  10 Units Subcutaneous At Bedtime     Terrence  1 packet Oral BID w/meals     latanoprost  1 drop Both Eyes At Bedtime     metFORMIN  500 mg Oral BID w/meals     metoprolol tartrate  50 mg Oral or NG Tube BID     mirtazapine  15 mg Oral or NG Tube At Bedtime     multivitamins w/minerals  15 mL Per Feeding Tube Daily     olopatadine  1 drop Both Eyes Daily     pantoprazole  40 mg Oral or NG Tube BID AC     piperacillin-tazobactam  3.375 g Intravenous Q8H     polyvinyl alcohol  1 drop Both Eyes TID     potassium chloride  20 mEq Oral or Feeding Tube Once     sodium chloride (PF)  10-30 mL Intracatheter Q8H     sodium chloride (PF)  3 mL Intracatheter Q8H     thiamine  100 mg Oral or NG Tube Daily     traZODone  50 mg Oral or NG Tube At Bedtime     Continuous Infusions:    - MEDICATION  INSTRUCTIONS -       PRN Meds:.sodium chloride 0.9%, acetaminophen, glucose **OR** dextrose **OR** glucagon, glucose **OR** dextrose **OR** glucagon, lidocaine 4%, lidocaine, lidocaine (buffered or not buffered), melatonin, methocarbamol, naloxone **OR** naloxone **OR** naloxone **OR** naloxone, nitroGLYcerin, oxyCODONE, - MEDICATION INSTRUCTIONS -, senna-docusate, simethicone, sodium chloride (PF)    Objective:  Vital signs in last 24 hours:  Temp:  [97.7  F (36.5  C)-98.9  F (37.2  C)] 98.1  F (36.7  C)  Pulse:  [69-87] 73  Resp:  [18-20] 20  BP: (114-190)/(54-78) 137/65  SpO2:  [93 %-99 %] 98 %      Physical Exam:  General: In NAD  HEENT: NCAT & EOMI. +NGT  CV: Normal S1S2, regular rhythm, normal rate  Lungs: CTAB  Abdomen: Soft, NT, ND, +BS  Extremities: No LE edema b/l  Neuro: AAOx3    Lab Results:    Recent Results (from the past 24 hour(s))   Glucose by meter    Collection Time: 03/15/23 12:53 PM   Result Value Ref Range    GLUCOSE BY METER POCT 264 (H) 70 - 99 mg/dL   Glucose by meter    Collection Time: 03/15/23  4:06 PM   Result Value Ref Range    GLUCOSE BY METER POCT 165 (H) 70 - 99 mg/dL   Glucose by meter    Collection Time: 03/15/23  8:46 PM   Result Value Ref Range    GLUCOSE BY METER POCT 79 70 - 99 mg/dL   Glucose by meter    Collection Time: 03/15/23  9:54 PM   Result Value Ref Range    GLUCOSE BY METER POCT 103 (H) 70 - 99 mg/dL   Hemoglobin and hematocrit    Collection Time: 03/16/23  6:12 AM   Result Value Ref Range    Hemoglobin 7.8 (L) 11.7 - 15.7 g/dL    Hematocrit 25.0 (L) 35.0 - 47.0 %   Phosphorus    Collection Time: 03/16/23  6:12 AM   Result Value Ref Range    Phosphorus 3.9 2.5 - 4.5 mg/dL   Magnesium    Collection Time: 03/16/23  6:12 AM   Result Value Ref Range    Magnesium 1.7 1.7 - 2.3 mg/dL   Potassium    Collection Time: 03/16/23  6:12 AM   Result Value Ref Range    Potassium 3.8 3.4 - 5.3 mmol/L   Glucose by meter    Collection Time: 03/16/23  8:09 AM   Result Value Ref Range     GLUCOSE BY METER POCT 71 70 - 99 mg/dL       Serum Glucose range:   Recent Labs   Lab 03/16/23  0809 03/15/23  2154 03/15/23  2046 03/15/23  1606   GLC 71 103* 79 165*     ABG: No lab results found in last 7 days.  CBC:   Recent Labs   Lab 03/16/23  0612 03/15/23  0631 03/14/23  0543 03/13/23  1158 03/12/23  0712 03/10/23  1215 03/10/23  0651   WBC  --   --   --   --   --   --  4.5   HGB 7.8* 7.9* 7.6*   < > 6.4*   < > 7.0*   HCT 25.0* 25.5* 25.2*   < >  --   --  24.2*   MCV  --   --   --   --   --   --  96   PLT  --   --   --   --  250  --  159    < > = values in this interval not displayed.     Chemistry:   Recent Labs   Lab 03/16/23  0612 03/15/23  0631 03/14/23  0543 03/13/23  1555 03/13/23  0620 03/12/23  0712 03/11/23  1419 03/11/23  0513 03/10/23  0808   NA  --   --   --   --   --  140  --  137 135*   POTASSIUM 3.8 4.1 3.8  --  4.0 4.1  --  4.4 4.3   CHLORIDE  --   --   --   --   --  105  --  107 109*   CO2  --   --   --   --   --  26  --  25 19*   BUN  --   --   --   --   --  13.3  --  13.4 7.4*   CR  --   --   --   --   --  0.68  --  0.57 0.62   GFRESTIMATED  --   --   --   --   --  >90  --  >90 >90   OLAF  --   --   --   --   --  7.3*  --  7.6* 7.3*   MAG 1.7 1.7 1.8   < > 1.4* 1.8   < > 1.5* 1.7   PROTTOTAL  --   --   --   --  5.4*  --   --   --   --    ALBUMIN  --   --   --   --  2.0*  --   --   --   --    AST  --   --   --   --  60*  --   --   --   --    ALT  --   --   --   --  32  --   --   --   --    ALKPHOS  --   --   --   --  248*  --   --   --   --    BILITOTAL  --   --   --   --  0.2  --   --   --   --     < > = values in this interval not displayed.     Coags:  No results for input(s): INR, PROTIME, PTT in the last 168 hours.    Invalid input(s): APTT  Cardiac Markers:  No results for input(s): CKTOTAL, TROPONINI in the last 168 hours.               03/16/2023   Cyn Madera MD  Hospitalist  Pager: [phone]

## 2023-03-16 NOTE — PLAN OF CARE
"Goal Outcome Evaluation:    Pt is A&Ox43, answers questions appropriatelpt denied pain and SOB. Pt does c/o pain during repositioning, refused repox2 because she was \"comfortable\". Explained to pt risks of not repositioning, pt became angry and stated \"leave me alone\". Pt has eaten 100% of breakfast and dinner and is drinking well with meals. Wound vac intact and patent. 1 loose stool today, purewick in place. Remained in bed this shift. Receieved potassium replacement today .  Problem: Mobility Impairment  Goal: Optimal Mobility  Outcome: Progressing  Intervention: Optimize Mobility  Recent Flowsheet Documentation  Taken 3/16/2023 0937 by Debbie Kaba, RN  Activity Management: activity adjusted per tolerance     Problem: Electrolyte Imbalance  Goal: Electrolyte Imbalance: Plan of Care  Outcome: Progressing     Problem: Oral Intake Inadequate  Goal: Improved Oral Intake  Outcome: Progressing     Problem: Malnutrition  Goal: Improved Nutritional Intake  Outcome: Progressing     Problem: Plan of Care - These are the overarching goals to be used throughout the patient stay.    Goal: Absence of Hospital-Acquired Illness or Injury  Intervention: Identify and Manage Fall Risk  Recent Flowsheet Documentation  Taken 3/16/2023 0937 by Debbie Kaba, RN  Safety Promotion/Fall Prevention: activity supervised  Goal: Optimal Comfort and Wellbeing  Intervention: Provide Person-Centered Care  Recent Flowsheet Documentation  Taken 3/16/2023 0937 by Debbie Kaba, RN  Trust Relationship/Rapport:   care explained   choices provided   emotional support provided   thoughts/feelings acknowledged     Problem: Risk for Delirium  Goal: Improved Behavioral Control  Intervention: Minimize Safety Risk  Recent Flowsheet Documentation  Taken 3/16/2023 0937 by Debbie Kaba RN  Trust Relationship/Rapport:   care explained   choices provided   emotional support provided   thoughts/feelings acknowledged     Problem: Wound Healing " Progression  Goal: Optimal Wound Healing  Intervention: Promote Wound Healing  Recent Flowsheet Documentation  Taken 3/16/2023 0937 by Debbie Kaba, RN  Activity Management: activity adjusted per tolerance     Problem: Fall Injury Risk  Goal: Absence of Fall and Fall-Related Injury  Intervention: Promote Injury-Free Environment  Recent Flowsheet Documentation  Taken 3/16/2023 0937 by Debbie Kaba, RN  Safety Promotion/Fall Prevention: activity supervised

## 2023-03-17 NOTE — PLAN OF CARE
Problem: Electrolyte Imbalance  Goal: Electrolyte Imbalance: Plan of Care  Outcome: Progressing  Flowsheets (Taken 3/17/2023 1526)  Electrolyte Imbalance: Plan of Care: electrolyte balance     Problem: Hypertension Acute  Goal: Blood Pressure Within Desired Range  Outcome: Progressing  Intervention: Normalize Blood Pressure  Recent Flowsheet Documentation  Taken 3/17/2023 0930 by Katy Cooley RN  Sensory Stimulation Regulation: care clustered  Medication Review/Management: medications reviewed     Problem: Oral Intake Inadequate  Goal: Improved Oral Intake  Outcome: Progressing     Problem: Hyperglycemia  Goal: Blood Glucose Level Within Targeted Range  Outcome: Progressing   Goal Outcome Evaluation:       Patient doing well. Alert and orientated X2, forgetful. WOC here and changed wound vac to wounds on right bottom. Patient turned as she would allow. BP stable on medications., Oral intake getting better tends to feed self with set up. Continues on IV antibiotics. On Mg+ K+ and Phos. Protocols. BG stable receives insulin per S/S. Offer emotional support. Given lavender essential oil.

## 2023-03-17 NOTE — PROGRESS NOTES
Hospitalist Progress Note        CODE STATUS:  Full Code    Assessment/Plan:    Cristal Preciado is a 72-year-old woman, bedbound nursing home resident with history of sacral decubitus ulcer (present on admission), CVA, dementia, neuropathy, DM2, CKD, CAD, failure to thrive admitted on 2/16/2023 with infected new sacral decubitus ulcer vs possible necrotizing fasciitis.  She underwent I&D (2/17) with necrotizing tissue down to the sacrum and  with osteomyelitis.       Hospital course has been complicated by anemia requiring blood transfusion and malnutrition due to poor oral intake; currently on trial of NG feeds with calorie count. She has been eating 100% of all three meals a day and so reasonable to discontinue tube feeds.     Severe sepsis secondary to infected stage IV sacral and right greater trochanter decubitus ulcers with sacral osteomyelitis  - Hemodynamically stable  - Continue Zosyn x 4-6 weeks from source control (2/17/23).  PICC placed.  - Wound VAC  - Nutritional support     Moderate malnutrition, weight loss, failure to thrive  - Review of prior notes regarding EGD, alternative feeds.  Per previous providers discussion with Court MsAngelica Ozzy, patient has had a bad experience with G-tube in family and would like to start with NG.    - NG placed 3/9/23 and started TF.    - She has been completing 100% of all three meals daily. NJ tube and tube feeds discontinued Mar15   - I have tried to call patient's daughter daily since Mar 15. Voicemail full.    - Monitor Mg, phos, K for refeeding.     Acute blood loss anemia: Hgb was 5.7 after surgery. Transfused 2 units of packed RBCs.  Hemoglobin has been stable.  - On 3/12/2023, hemoglobin dropped to 6.4 for which she was transfused with 1 unit of PRBC.  - Hgb stable  - Monitor hemoglobin and transfuse as needed    Type II DM  Hyperglycemia  - HgbA1c on 1/3/2023 was 7.6 %  - Now that off tube feeds,  lantus resumed at lower dose. Continue metformin, twice  daily  - Will make adjustments as needed  - Continue Novolog sliding scale.   - PTA Victoza  hold.     HTN  - Cont Metoprolol     History of CAD  - Continue Metoprolol, lipitor, asa      Goal of care:   - Honoring choices filled out nominating daughter Court as surrogate if needed for decisions as of 3/7/23.      Hyponatremia, hypokalemia, hypomagnesemia  - Monitor electrolytes and replace as per protocol     Hypercalcemia   - Mild on admission and probably from immobility vs. dehydration.  Resolved     Pressure ulcer on bilateral heels, unstageable: Present on admission. L heel: 4  x 5.5 cm  and R heel: 2  x 2 cm, the skin surface is black.  - Wound care consulted. On offload boots.  Regular turning in bed     History of ulcer, GERD  - Cont PTA PPI     Hyperlipidemia  - Cont Lipitor      DVT Prophylaxis: Lovenox    Disposition/Barrier to discharge;  SW addressing discharge placement.    Subjective:  Interval History:   Patient seen and examined.   No events overnight.      Review of Systems:   As mentioned in subjective.    Patient Active Problem List   Diagnosis     Cataract     CAD (coronary artery disease)     Diabetic nephropathy (H)     Diabetic neuropathy (H)     Diabetic retinopathy (H)     GERD (gastroesophageal reflux disease)     Glaucoma     Hypertension     Hyperlipidemia     H/O: stroke     Anemia     Seasonal allergies     Depression     Tubular adenoma of colon     Leg weakness     Dermatitis, seborrheic     Eczematous dermatitis     History of coronary artery disease     Venous stasis dermatitis of both lower extremities     Chronic dermatitis of hands     Neoplasm of uncertain behavior of skin     S/P hysterectomy     Hypomagnesemia     Gout     DM type 2 w Neuro/Retin/Nephr -- hgb A1C 7.6 on 1/3/23     Stage 3 chronic kidney disease, unspecified whether stage 3a or 3b CKD (H)     Acute chest pain     Chronic kidney disease, stage 2 (mild)     Acute metabolic encephalopathy     Oropharyngeal  dysphagia     Sacral decubitus ulcer     Dehydration     Confusion     Adult failure to thrive     Acute kidney injury (H)     Cognitive impairment Consistent with Mild Dementia     Polyneuropathy associated with underlying disease (H)     Acute cystitis with hematuria     Delirium     Protein-calorie malnutrition (H)     Wound infection     Sacral wound, initial encounter     Severe sepsis (H)     Hypokalemia     Hypercalcemia     Anemia due to blood loss, acute     Pressure injury of contiguous region involving buttock and hip, stage 4 (H)     Necrotizing fasciitis (H)     Hypoglycemia     Pressure ulcer of both heels, unstageable (H)       Scheduled Meds:    [START ON 3/18/2023] amoxicillin-clavulanate  1 tablet Oral Q12H [facility] (08/20)     aspirin  81 mg Oral or NG Tube Daily     atorvastatin  80 mg Oral or NG Tube QPM     diclofenac  2 g Topical 4x Daily     dorzolamide-timolol  1 drop Both Eyes BID     DULoxetine  90 mg Oral or NG Tube QAM     enoxaparin ANTICOAGULANT  30 mg Subcutaneous Q24H     insulin aspart  1-10 Units Subcutaneous TID AC     insulin aspart  1-7 Units Subcutaneous At Bedtime     insulin glargine  10 Units Subcutaneous At Bedtime     Terrence  1 packet Oral BID w/meals     latanoprost  1 drop Both Eyes At Bedtime     metFORMIN  500 mg Oral BID w/meals     metoprolol tartrate  50 mg Oral or NG Tube BID     mirtazapine  15 mg Oral or NG Tube At Bedtime     multivitamin w/minerals  1 tablet Oral or Feeding Tube Daily     olopatadine  1 drop Both Eyes Daily     pantoprazole  40 mg Oral QAM AC     piperacillin-tazobactam  3.375 g Intravenous Q8H     polyvinyl alcohol  1 drop Both Eyes TID     sodium chloride (PF)  10-30 mL Intracatheter Q8H     sodium chloride (PF)  3 mL Intracatheter Q8H     thiamine  100 mg Oral or NG Tube Daily     traZODone  50 mg Oral or NG Tube At Bedtime     Continuous Infusions:    - MEDICATION INSTRUCTIONS -       PRN Meds:.sodium chloride 0.9%, acetaminophen, glucose **OR**  dextrose **OR** glucagon, lidocaine 4%, lidocaine, lidocaine (buffered or not buffered), melatonin, methocarbamol, naloxone **OR** naloxone **OR** naloxone **OR** naloxone, nitroGLYcerin, oxyCODONE, - MEDICATION INSTRUCTIONS -, senna-docusate, simethicone, sodium chloride (PF)    Objective:  Vital signs in last 24 hours:  Temp:  [97.3  F (36.3  C)-98.5  F (36.9  C)] 98.5  F (36.9  C)  Pulse:  [70-83] 70  Resp:  [17-18] 18  BP: (136-162)/(62-69) 152/68  SpO2:  [95 %-100 %] 98 %      Physical Exam:  General: In NAD  HEENT: NCAT & EOMI.  CV: Normal S1S2, regular rhythm, normal rate  Lungs: CTAB  Abdomen: Soft, NT, ND, +BS  Extremities: No LE edema b/l  Neuro: AAOx3    Lab Results:    Recent Results (from the past 24 hour(s))   Glucose by meter    Collection Time: 03/16/23 12:30 PM   Result Value Ref Range    GLUCOSE BY METER POCT 110 (H) 70 - 99 mg/dL   Glucose by meter    Collection Time: 03/16/23  4:27 PM   Result Value Ref Range    GLUCOSE BY METER POCT 143 (H) 70 - 99 mg/dL   Glucose by meter    Collection Time: 03/16/23  8:48 PM   Result Value Ref Range    GLUCOSE BY METER POCT 151 (H) 70 - 99 mg/dL   Hemoglobin and hematocrit    Collection Time: 03/17/23  6:58 AM   Result Value Ref Range    Hemoglobin 8.7 (L) 11.7 - 15.7 g/dL    Hematocrit 28.7 (L) 35.0 - 47.0 %   Magnesium    Collection Time: 03/17/23  6:58 AM   Result Value Ref Range    Magnesium 1.5 (L) 1.7 - 2.3 mg/dL   Phosphorus    Collection Time: 03/17/23  6:58 AM   Result Value Ref Range    Phosphorus 3.7 2.5 - 4.5 mg/dL       Serum Glucose range:   Recent Labs   Lab 03/16/23  2048 03/16/23  1627 03/16/23  1230 03/16/23  0809   * 143* 110* 71     ABG: No lab results found in last 7 days.  CBC:   Recent Labs   Lab 03/17/23  0658 03/16/23  0612 03/15/23  0631 03/13/23  1158 03/12/23  0712   HGB 8.7* 7.8* 7.9*   < > 6.4*   HCT 28.7* 25.0* 25.5*   < >  --    PLT  --   --   --   --  250    < > = values in this interval not displayed.     Chemistry:    Recent Labs   Lab 03/17/23  0658 03/16/23  0612 03/15/23  0631 03/14/23  0543 03/13/23  1555 03/13/23  0620 03/12/23  0712 03/11/23  1419 03/11/23  0513 03/10/23  0808   NA  --   --   --   --   --   --  140  --  137 135*   POTASSIUM  --  3.8 4.1 3.8  --  4.0 4.1  --  4.4 4.3   CHLORIDE  --   --   --   --   --   --  105  --  107 109*   CO2  --   --   --   --   --   --  26  --  25 19*   BUN  --   --   --   --   --   --  13.3  --  13.4 7.4*   CR  --   --   --   --   --   --  0.68  --  0.57 0.62   GFRESTIMATED  --   --   --   --   --   --  >90  --  >90 >90   OLAF  --   --   --   --   --   --  7.3*  --  7.6* 7.3*   MAG 1.5* 1.7 1.7 1.8   < > 1.4* 1.8   < > 1.5* 1.7   PROTTOTAL  --   --   --   --   --  5.4*  --   --   --   --    ALBUMIN  --   --   --   --   --  2.0*  --   --   --   --    AST  --   --   --   --   --  60*  --   --   --   --    ALT  --   --   --   --   --  32  --   --   --   --    ALKPHOS  --   --   --   --   --  248*  --   --   --   --    BILITOTAL  --   --   --   --   --  0.2  --   --   --   --     < > = values in this interval not displayed.     Coags:  No results for input(s): INR, PROTIME, PTT in the last 168 hours.    Invalid input(s): APTT  Cardiac Markers:  No results for input(s): CKTOTAL, TROPONINI in the last 168 hours.               03/17/2023   Cyn Madera MD  Hospitalist  Pager: [phone]

## 2023-03-17 NOTE — PROGRESS NOTES
Discharge planning assist    Length of Stay (days): 29    Expected Discharge Date: 03/17/2023     Concerns to be Addressed:  Will need long term care.     Anticipated Discharge Disposition: Long term care.   Anticipated Discharge Services: Total care  Anticipated Discharge DME: Wound VAC.    Referrals Placed by CM/SW: See below   Private pay costs discussed: See previous care management notes.     Additional Information:  Per management request, left a message for admissions at [facility] to see if they would consider taking patient back (as her daughter is a resident there also).  1:50 PM:  Spoke with admissions for [facility] who will consider patient for admission. Referral sent.     Libertad Worrell RN

## 2023-03-17 NOTE — PLAN OF CARE
Goal Outcome Evaluation:       Pt is alert and oriented to person and place, but not situation or time. Takes meds whole PO with applesauce. One med at a time. Ax2 in bed. Regular adult diet. Pt has wound vac on R. Hip. Scabbed, black wounds on both heels. Zosyn hung at 2000. Incontinent of bowel and bladder, has purewick placed. Lung sounds are diminished in both sides. Reposition pt as tolerated every 2 hours. Pt did not complain of pain once repositioning was done. Pt is on Mg+, K+ and phosphate protocols to be drawn in the morning.        Problem: Plan of Care - These are the overarching goals to be used throughout the patient stay.    Goal: Absence of Hospital-Acquired Illness or Injury  Intervention: Prevent Skin Injury  Recent Flowsheet Documentation  Taken 3/16/2023 1726 by Jesica Harper, RN  Body Position:    left    side-lying    turned

## 2023-03-17 NOTE — PLAN OF CARE
Goal Outcome Evaluation:  Patient is resistant and yells out at staff with repositioning. Staff attempt  to explain reasons for repositioning without success!! Wound VAC patent. IV abx (Zosyn) infusing.               Problem: Mobility Impairment  Goal: Optimal Mobility  Outcome: Progressing  Intervention: Optimize Mobility  Recent Flowsheet Documentation  Taken 3/17/2023 0145 by Colt Ortega, RN  Activity Management: bedrest  Positioning/Transfer Devices:   pillows   in use

## 2023-03-17 NOTE — PROGRESS NOTES
[facility] Nurse Inpatient Assessment     Consulted for: Buttocks, heels    Patient History (according to provider note(s):      Cristal Preciado is a 72 year old female with PMH significant for known sacral decubitus ulcer (present on admission), CVA, dementia, neuropathy, DM2, CKD, CAD and FTT admitted on 2/16/2023 with infected new sacral decubitus ulcer with possible necrotizing fasciitis.      1. Sepsis- Admit patient. Infected Sacral Decubitus ulcer/Possible Necrotizing fasciits,UTI,Lactic acid= 6.4, WBC= 14.1. CT pelvis report reviewed. ED physician has already been in contact with General Surgeon on call and patient will be seen in consultation. Continue Vancomycin, Zosyn and Clindamycin. Continue IV fluids. Currently NPO. Repeat labs in a.m. Monitor vitals closely. Will make further recommendations based on clinical course.     2. Possible Necrotizing Fasciitis- ED has contacted General Surgeon on call and patient will be seen in consultation. Continue Vancomycin, Zosyn and Clindamycin. Plan per surgeon.    Areas Assessed:      Pressure Injury Location: Sacrum  (additional pictures available in media tab)                                                         3/10                                                                    3/13      Wound type: Pressure Injury     Pressure Injury Stage: 4, present on admission   Wound history/plan of care:   Patient admitted with worsening pressure wounds, was taken for an I&D  Wound base: 30 % slough and bone, 70 % dermis, granulation tissue, non-granular tissue and adipose tissue, more granulation today, likely assisted by NG/TF for nutrition.      Palpation of the wound bed: boggy      Drainage: small     Description of drainage: serosanguinous     Measurements (length x width x depth, in cm) 10  x 12  x  3.5 cm      Tunneling N/A     Undermining up to 1.5 cm from 8-11 o'clock  Periwound skin: Intact      Color: normal and consistent  with surrounding tissue, slight purple discoloration near gluteal cleft      Temperature: normal   Odor: none  Pain: mild, with palpation and during dressing change  Pain intervention prior to dressing change: slow and gentle cares , PO pain medication given by RN  Treatment goal: Heal , Increase granulation and Protection  STATUS: evolving  Supplies ordered: gathered    My PI Risk Assessment     Sensory Perception: 2 - Very Limited     Moisture: 1 - Constantly moist     Activity: 1 - Bedfast      Mobility: 2 - Very limited     Nutrition: 2 - Probably inadequate      Friction/Shear: 1 - Problem     TOTAL: 9  __________________________________________________________________________________________________________________  Pressure Injury Location: [facility]             3/10                                                                       3/13       Wound type: Pressure Injury     Pressure Injury Stage: 4, present on admission   Wound history/plan of care:   See above  Wound base: 30 % slough and bone, 70 % subcutaneous tissue, some granulation starting       Palpation of the wound bed: normal      Drainage: scant     Description of drainage: serosanguinous     Measurements (length x width x depth, in cm) 10  x 13  x  3 cm      Tunneling N/A     Undermining N/A  Periwound skin: Intact      Color: normal and consistent with surrounding tissue      Temperature: normal   Odor: none  Pain: mild,   Pain intervention prior to dressing change: slow and gentle cares   Treatment goal: Heal , Increase granulation and Protection  STATUS: edges are more defined, thinning yellow slough at center over bone  Supplies ordered: ordered additional large dressing and rings   ________________________________________________________________________________________________________________  3/17 - Only the wound vac was assessed today and changed, below assessment remains for continuity    Negative pressure wound therapy applied to:  Sacrum & R trocanter   Last photo: see above  Wound due to: Pressure Injury   Wound history/plan of care:      Surgical date: 2/18/23     Date Negative Pressure Wound Therapy initiated: 2/20/23     Interventions in place: repositioning, pressure redistribution with device or dressing, specialty surface in use, moisture/incontinence management and offloading    Is patient s nutritional status compromised? yes   a. If yes, what interventions are in place? Protein supplements    Reason for initiating vac therapy? Presence of co-morbidities, High risk of infections, Need for accelerated granulation tissue and Prior history of delayed wound healing    Which?of?the?following?co-morbidities?apply? Immobility  a. If diabetic is patient on a diabetic management program? N/A     Is osteomyelitis present in wound? Y  a.  If yes what treatments are in place? N/A    Number of foam pieces removed from a wound (excluding foam for bridge) : 3 black foam to right hip (1 extra to fill the depth at the center of the wound  Verified this matched the number of foam pieces applied last dressing change: no    Number of foam pieces packed into trochanter (excluding foam for bridge) : 1   To sacral wound: 1 black foam and ring along distal edge to prevent intrusion of stool, vashe soak x 10 minutes. Bridge between both wounds    _____________________________________________________________________________________________________________________________________________________________________    Pressure Injury Location: Bilateral heels        2/20 L heel                                                         2/27                                                                           3/13          2/20 R heel                                                         2/27                                                                     3/13      Wound type: Pressure Injury     Pressure Injury Stage: Unstageable, present on admission       "Wound history/plan of care:   See above  Wound base: 100 % eschar     Palpation of the wound bed: firm      Drainage: none     Description of drainage: none     Measurements (length x width x depth, in cm)   L heel: 4  x 5.5 cm    R heel: 2  x 2 cm      Tunneling N/A     Undermining N/A  Periwound skin: Intact and Scar tissue      Color: pink      Temperature: normal   Odor: none  Pain: absent, none  Pain intervention prior to dressing change: slow and gentle cares   Treatment goal: Maintain (prevention of deterioration) and offload pressure, reduce friction and shear  STATUS: stable   Supplies ordered: supplies stored on unit - no change in care needed        Treatment Plan:     Negative pressure wound therapy plan:  Wound location: Sacrum + R Trocanter    Change Days: Mon/Wed/Fri by WOC RN    Supplies (including all accessories) used: large Black foam , Adapt barrier ring and Cavilon no sting barrier film  Cleanse with Vashe prior to replacing VAC    Suction setting: -125   Methods used: Window paned all periwound skin with vac drape prior to applying sponge and barrier rings around entire sacral wound to protect from stool     Staff RN to assess integrity of dressing and ensure suction is set at appropriate level every shift.   Date canister. Chart canister output every shift. Change cannister weekly and PRN if full/occluded.    Remove foam dressing and replace with BID normal saline moist gauze dressing if:   -a dressing failure which cannot be repaired within 2 hours   -patient is discharging to home without a home pump   -patient is discharging to a facility outside the local area   -if a dressing is a \"Silver Foam\", remove before Radiation Therapy or MRI     The hospital VAC pump is not to be discharged with the patient.?Ensure to disconnect patient from machine prior to discharge. Then,    - If a home KCI VAC pump has been delivered, connect home cannister to dressing tubing then connect cannister to home " pump and turn on machine    - If transferring to a nearby facility with a KCI vac, can disconnect and clamp tubing then cover end with glove so can be reconnected within 2 hours      Heels  1. Paint with betadine daily, allow to dry  2. Offload heels at all times while in bed, utilize prevlon boots    RECOMMEND PRIMARY TEAM ORDER: None, at this time  Education provided: importance of repositioning, wound progress, Infection prevention , Moisture management and Off-loading pressure  Discussed plan of care with: Patient and Nurse  [facility] nurse follow-up plan: Monday/WednesdayFriday  Notify WO if wound(s) deteriorate.  Nursing to notify the Provider(s) and re-consult the [facility] Nurse if new skin concern.    DATA:     Current support surface: Standard  Standard gel/foam mattress (IsoFlex, Atmos air, etc)  Containment of urine/stool: Incontinence Protocol  BMI: Body mass index is 25.2 kg/m .   Active diet order: Orders Placed This Encounter      Regular Diet Adult     Output: I/O last 3 completed shifts:  In: 730 [P.O.:700; I.V.:30]  Out: 850 [Urine:500; Drains:350]     Labs:   Recent Labs   Lab 03/17/23  0658 03/13/23  1158 03/13/23  0620   ALBUMIN  --   --  2.0*   HGB 8.7*   < >  --     < > = values in this interval not displayed.     Pressure injury risk assessment:   Sensory Perception: 3-->slightly limited  Moisture: 3-->occasionally moist  Activity: 1-->bedfast  Mobility: 2-->very limited  Nutrition: 2-->probably inadequate  Friction and Shear: 1-->problem  Lalit Score: 12    CRIS GomezN RN CWOCN  Pager no longer is use, please contact through [email or web address] group: [facility]

## 2023-03-17 NOTE — PROGRESS NOTES
Care Management Follow Up    Length of Stay (days): 29    Expected Discharge Date: 03/20/2023     Concerns to be Addressed: discharge planning       Patient plan of care discussed at interdisciplinary rounds: Yes    Anticipated Discharge Disposition: Long Term Care     Anticipated Discharge Services: Other (see comment) (nursing care services, assistance with ADLs, care and supervision)    Anticipated Discharge DME: None    Patient/family educated on Medicare website which has current facility and service quality ratings: yes    Education Provided on the Discharge Plan:  Yes    Patient/Family in Agreement with the Plan: yes    Referrals Placed by CM/ERIKA: Post Acute Facilities    Private pay costs discussed: Not applicable    Additional Information: ERIKA informed that pt now medically ready to discharge.  Per charge nurse, pt no longer on feeding tube and is eating meals.  ERIKA following up on LTC referrals.  ERIKA spoke with Lesli at [facility] - no LTC beds - 2 month wait list.  Only TCU one is shared male bed.  ERIKA left messages for Narayan at \[facility]\"" at [place] and for admissions at [facility] and requested return calls regarding referrals.  ERIKA left msg for Carolyne at [facility] re: referral and requested return call.  Spoke with Adelaida at [facility] - yes she does have LTC beds - reviewing - will call SW back.  ERIKA left message for [facility] admissions re: referral and requested return call.  ERIKA spoke with Serena at [facility] - admits on hold due to Covid outbreak.  ERIKA spoke with Rosalva at [facility] regarding referral - reviewing and will call SW back.  ERIKA spoke with Rosalva again - needs full referral sent - sent and pending.  Rosalva called back and declined pt due to too many complex wounds.      ERIKA updated by RN BRANDY Maurer that [facility] would consider taking pt back and that she sent a new referral there.    ERIKA met briefly with pt.  ERIKA asked pt  regarding if there was a way for her to return to [facility] would she want to go there and pt replied that yes she would like this.  SW informed pt that CM looking into this and will keep pt informed about discharge plan.          DAPHNEY Stevenson, DUKESW 03/17/23 6:33 PM

## 2023-03-18 NOTE — PROGRESS NOTES
Hospitalist Progress Note        CODE STATUS:  Full Code    Assessment/Plan:    Cristal Preciado is a 72-year-old woman, bedbound nursing home resident with history of sacral decubitus ulcer (present on admission), CVA, dementia, neuropathy, DM2, CKD, CAD, failure to thrive admitted on 2/16/2023 with infected new sacral decubitus ulcer vs possible necrotizing fasciitis.  She underwent I&D (2/17) with necrotizing tissue down to the sacrum and  with osteomyelitis.       Hospital course has been complicated by anemia requiring blood transfusion and malnutrition due to poor oral intake; currently on trial of NG feeds with calorie count. She has been eating 100% of all three meals a day and so reasonable to discontinue tube feeds.     Severe sepsis secondary to infected stage IV sacral and right greater trochanter decubitus ulcers with sacral osteomyelitis  - Hemodynamically stable  - Continue Zosyn x 4-6 weeks from source control (2/17/23).  PICC placed.  - Wound VAC  - Nutritional support     Moderate malnutrition, weight loss, failure to thrive  - Review of prior notes regarding EGD, alternative feeds.  Per previous providers discussion with Court MsAngelica Ozzy, patient has had a bad experience with G-tube in family and would like to start with NG.    - NG placed 3/9/23 and started TF.    - She has been completing 100% of all three meals daily. NJ tube and tube feeds discontinued Mar15   - I have tried to call patient's daughter daily since Mar 15. Voicemail full.    - Monitor Mg, phos, K for refeeding.     Acute blood loss anemia: Hgb was 5.7 after surgery. Transfused 2 units of packed RBCs.  Hemoglobin has been stable.  - On 3/12/2023, hemoglobin dropped to 6.4 for which she was transfused with 1 unit of PRBC.  - Hgb stable  - Monitor hemoglobin and transfuse as needed    Type II DM  Hyperglycemia  - HgbA1c on 1/3/2023 was 7.6 %  - Now that off tube feeds, lantus resumed at lower dose. Continue metformin, twice  daily  - Will make adjustments as needed  - Continue Novolog sliding scale.   - PTA Victoza  hold.     HTN  - Cont Metoprolol     History of CAD  - Continue Metoprolol, lipitor, asa      Goal of care:   - Honoring choices filled out nominating truman Yun as surrogate if needed for decisions as of 3/7/23.      Hyponatremia, hypokalemia, hypomagnesemia  - Monitor electrolytes and replace as per protocol     Hypercalcemia   - Mild on admission and probably from immobility vs. dehydration.  Resolved     Pressure ulcer on bilateral heels, unstageable: Present on admission. L heel: 4  x 5.5 cm  and R heel: 2  x 2 cm, the skin surface is black.  - Wound care consulted. On offload boots.  Regular turning in bed     History of ulcer, GERD  - Cont PTA PPI     Hyperlipidemia  - Cont Lipitor      DVT Prophylaxis: Lovenox    Disposition/Barrier to discharge;  SW addressing discharge placement.    Subjective:  Interval History:   Patient seen and examined.   No events overnight.    Tried calling daughtermatthew, around 9am. Phone rings once and then to a full voice message box.      Review of Systems:   As mentioned in subjective.    Patient Active Problem List   Diagnosis     Cataract     CAD (coronary artery disease)     Diabetic nephropathy (H)     Diabetic neuropathy (H)     Diabetic retinopathy (H)     GERD (gastroesophageal reflux disease)     Glaucoma     Hypertension     Hyperlipidemia     H/O: stroke     Anemia     Seasonal allergies     Depression     Tubular adenoma of colon     Leg weakness     Dermatitis, seborrheic     Eczematous dermatitis     History of coronary artery disease     Venous stasis dermatitis of both lower extremities     Chronic dermatitis of hands     Neoplasm of uncertain behavior of skin     S/P hysterectomy     Hypomagnesemia     Gout     DM type 2 w Neuro/Retin/Nephr -- hgb A1C 7.6 on 1/3/23     Stage 3 chronic kidney disease, unspecified whether stage 3a or 3b CKD (H)     Acute  chest pain     Chronic kidney disease, stage 2 (mild)     Acute metabolic encephalopathy     Oropharyngeal dysphagia     Sacral decubitus ulcer     Dehydration     Confusion     Adult failure to thrive     Acute kidney injury (H)     Cognitive impairment Consistent with Mild Dementia     Polyneuropathy associated with underlying disease (H)     Acute cystitis with hematuria     Delirium     Protein-calorie malnutrition (H)     Wound infection     Sacral wound, initial encounter     Severe sepsis (H)     Hypokalemia     Hypercalcemia     Anemia due to blood loss, acute     Pressure injury of contiguous region involving buttock and hip, stage 4 (H)     Necrotizing fasciitis (H)     Hypoglycemia     Pressure ulcer of both heels, unstageable (H)       Scheduled Meds:    amoxicillin-clavulanate  1 tablet Oral Q12H [facility] (08/20)     aspirin  81 mg Oral or NG Tube Daily     atorvastatin  80 mg Oral or NG Tube QPM     diclofenac  2 g Topical 4x Daily     dorzolamide-timolol  1 drop Both Eyes BID     DULoxetine  90 mg Oral or NG Tube QAM     enoxaparin ANTICOAGULANT  30 mg Subcutaneous Q24H     insulin aspart  1-10 Units Subcutaneous TID AC     insulin aspart  1-7 Units Subcutaneous At Bedtime     insulin glargine  13 Units Subcutaneous At Bedtime     Terrence  1 packet Oral BID w/meals     latanoprost  1 drop Both Eyes At Bedtime     metFORMIN  500 mg Oral BID w/meals     metoprolol tartrate  50 mg Oral or NG Tube BID     mirtazapine  15 mg Oral or NG Tube At Bedtime     multivitamin w/minerals  1 tablet Oral or Feeding Tube Daily     olopatadine  1 drop Both Eyes Daily     pantoprazole  40 mg Oral QAM AC     polyvinyl alcohol  1 drop Both Eyes TID     sodium chloride (PF)  10-30 mL Intracatheter Q8H     sodium chloride (PF)  3 mL Intracatheter Q8H     thiamine  100 mg Oral or NG Tube Daily     traZODone  50 mg Oral or NG Tube At Bedtime     Continuous Infusions:    - MEDICATION INSTRUCTIONS -       PRN Meds:.sodium chloride  0.9%, acetaminophen, glucose **OR** dextrose **OR** glucagon, diphenhydrAMINE **OR** diphenhydrAMINE, lidocaine 4%, lidocaine, lidocaine (buffered or not buffered), melatonin, methocarbamol, naloxone **OR** naloxone **OR** naloxone **OR** naloxone, nitroGLYcerin, oxyCODONE, - MEDICATION INSTRUCTIONS -, senna-docusate, simethicone, sodium chloride (PF)    Objective:  Vital signs in last 24 hours:  Temp:  [97.3  F (36.3  C)-98.2  F (36.8  C)] 98.2  F (36.8  C)  Pulse:  [71-78] 71  Resp:  [18] 18  BP: (137-174)/(64-76) 137/65  SpO2:  [99 %-100 %] 99 %      Physical Exam:  General: In NAD  HEENT: NCAT & EOMI.  CV: Normal S1S2, regular rhythm, normal rate  Lungs: CTAB  Abdomen: Soft, NT, ND, +BS  Extremities: No LE edema b/l  Neuro: AAOx3    Lab Results:    Recent Results (from the past 24 hour(s))   Glucose by meter    Collection Time: 03/17/23 12:31 PM   Result Value Ref Range    GLUCOSE BY METER POCT 155 (H) 70 - 99 mg/dL   Magnesium    Collection Time: 03/17/23  2:55 PM   Result Value Ref Range    Magnesium 2.2 1.7 - 2.3 mg/dL   Glucose by meter    Collection Time: 03/17/23  5:05 PM   Result Value Ref Range    GLUCOSE BY METER POCT 238 (H) 70 - 99 mg/dL   Glucose by meter    Collection Time: 03/17/23 10:24 PM   Result Value Ref Range    GLUCOSE BY METER POCT 291 (H) 70 - 99 mg/dL   Hemoglobin and hematocrit    Collection Time: 03/18/23  5:00 AM   Result Value Ref Range    Hemoglobin 7.7 (L) 11.7 - 15.7 g/dL    Hematocrit 25.4 (L) 35.0 - 47.0 %   Phosphorus    Collection Time: 03/18/23  5:00 AM   Result Value Ref Range    Phosphorus 2.9 2.5 - 4.5 mg/dL   Potassium    Collection Time: 03/18/23  5:00 AM   Result Value Ref Range    Potassium 4.0 3.4 - 5.3 mmol/L   Magnesium    Collection Time: 03/18/23  5:00 AM   Result Value Ref Range    Magnesium 1.7 1.7 - 2.3 mg/dL   Glucose by meter    Collection Time: 03/18/23  7:20 AM   Result Value Ref Range    GLUCOSE BY METER POCT 156 (H) 70 - 99 mg/dL       Serum Glucose range:    Recent Labs   Lab 03/18/23  0720 03/17/23  2224 03/17/23  1705 03/17/23  1231   * 291* 238* 155*     ABG: No lab results found in last 7 days.  CBC:   Recent Labs   Lab 03/18/23  0500 03/17/23  0658 03/16/23  0612 03/13/23  1158 03/12/23  0712   HGB 7.7* 8.7* 7.8*   < > 6.4*   HCT 25.4* 28.7* 25.0*   < >  --    PLT  --   --   --   --  250    < > = values in this interval not displayed.     Chemistry:   Recent Labs   Lab 03/18/23  0500 03/17/23  1455 03/17/23  0658 03/16/23  0612 03/13/23  1555 03/13/23  0620 03/12/23  0712   NA  --   --   --   --   --   --  140   POTASSIUM 4.0  --  3.8 3.8   < > 4.0 4.1   CHLORIDE  --   --   --   --   --   --  105   CO2  --   --   --   --   --   --  26   BUN  --   --   --   --   --   --  13.3   CR  --   --   --   --   --   --  0.68   GFRESTIMATED  --   --   --   --   --   --  >90   OLAF  --   --   --   --   --   --  7.3*   MAG 1.7 2.2 1.5* 1.7   < > 1.4* 1.8   PROTTOTAL  --   --   --   --   --  5.4*  --    ALBUMIN  --   --   --   --   --  2.0*  --    AST  --   --   --   --   --  60*  --    ALT  --   --   --   --   --  32  --    ALKPHOS  --   --   --   --   --  248*  --    BILITOTAL  --   --   --   --   --  0.2  --     < > = values in this interval not displayed.     Coags:  No results for input(s): INR, PROTIME, PTT in the last 168 hours.    Invalid input(s): APTT  Cardiac Markers:  No results for input(s): CKTOTAL, TROPONINI in the last 168 hours.               03/18/2023   Cyn Madera MD  Hospitalist  Pager: [phone]

## 2023-03-18 NOTE — PLAN OF CARE
Patient is oriented to self. She denies pain except for with repositioning. Repositioned q 2 hrs. Patient resists being repositioned. She needs encouragement. Patient ate 100% of dinner. She had large loose stool. Patient takes meds with applesauce.  Problem: Electrolyte Imbalance  Goal: Electrolyte Imbalance: Plan of Care  Outcome: Progressing     Problem: Hypertension Acute  Goal: Blood Pressure Within Desired Range  Outcome: Progressing  Intervention: Normalize Blood Pressure  Recent Flowsheet Documentation  Taken 3/17/2023 1806 by Cece Kuhn RN  Medication Review/Management: medications reviewed  Taken 3/17/2023 1635 by Cece Kuhn RN  Sensory Stimulation Regulation: care clustered     Problem: Oral Intake Inadequate  Goal: Improved Oral Intake  Outcome: Progressing     Problem: Malnutrition  Goal: Improved Nutritional Intake  Outcome: Progressing     Problem: Plan of Care - These are the overarching goals to be used throughout the patient stay.    Goal: Absence of Hospital-Acquired Illness or Injury  Intervention: Identify and Manage Fall Risk  Recent Flowsheet Documentation  Taken 3/17/2023 1806 by Cece Kuhn RN  Safety Promotion/Fall Prevention:   bed alarm on   assistive device/personal items within reach  Intervention: Prevent Skin Injury  Recent Flowsheet Documentation  Taken 3/17/2023 1635 by Cece Kuhn RN  Body Position:   turned   left  Intervention: Prevent and Manage VTE (Venous Thromboembolism) Risk  Recent Flowsheet Documentation  Taken 3/17/2023 1635 by Cece Kuhn RN  VTE Prevention/Management: patient refused intervention     Problem: Risk for Delirium  Goal: Improved Behavioral Control  Intervention: Minimize Safety Risk  Recent Flowsheet Documentation  Taken 3/17/2023 1806 by Cece Kuhn RN  Enhanced Safety Measures: bed alarm set  Goal: Improved Attention and Thought Clarity  Intervention: Maximize Cognitive Function  Recent Flowsheet Documentation  Taken 3/17/2023  1635 by Cece Kuhn RN  Sensory Stimulation Regulation: care clustered  Reorientation Measures:   clock in view   reorientation provided     Problem: Pain Acute  Goal: Optimal Pain Control and Function  Intervention: Prevent or Manage Pain  Recent Flowsheet Documentation  Taken 3/17/2023 1806 by Cece Kuhn RN  Medication Review/Management: medications reviewed  Taken 3/17/2023 1635 by Cece Kuhn RN  Sensory Stimulation Regulation: care clustered     Problem: Violence Risk or Actual  Goal: Anger and Impulse Control  Intervention: Minimize Safety Risk  Recent Flowsheet Documentation  Taken 3/17/2023 1806 by Cece Kuhn RN  Enhanced Safety Measures: bed alarm set  Taken 3/17/2023 1635 by Cece Kuhn RN  Sensory Stimulation Regulation: care clustered     Problem: Hypertension Acute  Goal: Blood Pressure Within Desired Range  Intervention: Normalize Blood Pressure  Recent Flowsheet Documentation  Taken 3/17/2023 1806 by Cece Kuhn RN  Medication Review/Management: medications reviewed  Taken 3/17/2023 1635 by Cece Kuhn RN  Sensory Stimulation Regulation: care clustered     Problem: Wound Healing Progression  Goal: Optimal Wound Healing  Intervention: Promote Wound Healing  Recent Flowsheet Documentation  Taken 3/17/2023 1806 by Cece Kuhn RN  Activity Management: activity adjusted per tolerance  Taken 3/17/2023 1608 by Cece Kuhn RN  Activity Management: activity adjusted per tolerance     Problem: Enteral Nutrition  Goal: Absence of Aspiration Signs and Symptoms  Intervention: Minimize Aspiration Risk  Recent Flowsheet Documentation  Taken 3/17/2023 1635 by Cece Kuhn RN  Head of Bed (HOB) Positioning: HOB at 20-30 degrees     Problem: Mobility Impairment  Goal: Optimal Mobility  Intervention: Optimize Mobility  Recent Flowsheet Documentation  Taken 3/17/2023 1806 by Cece Kuhn RN  Activity Management: activity adjusted per tolerance  Taken 3/17/2023 1635 by Bam  Cece, RN  Positioning/Transfer Devices:   pillows   in use  Taken 3/17/2023 1608 by Cece Kuhn, RN  Activity Management: activity adjusted per tolerance     Problem: Fall Injury Risk  Goal: Absence of Fall and Fall-Related Injury  Intervention: Identify and Manage Contributors  Recent Flowsheet Documentation  Taken 3/17/2023 1806 by Cece Kuhn, RN  Medication Review/Management: medications reviewed  Intervention: Promote Injury-Free Environment  Recent Flowsheet Documentation  Taken 3/17/2023 1806 by Cece Kuhn, RN  Safety Promotion/Fall Prevention:   bed alarm on   assistive device/personal items within reach     Problem: Enteral Nutrition  Goal: Absence of Aspiration Signs and Symptoms  Intervention: Minimize Aspiration Risk  Recent Flowsheet Documentation  Taken 3/17/2023 1635 by Cece Kuhn, RN  Head of Bed (HOB) Positioning: HOB at 20-30 degrees   Goal Outcome Evaluation:

## 2023-03-18 NOTE — PLAN OF CARE
Problem: Electrolyte Imbalance  Goal: Electrolyte Imbalance: Plan of Care  Outcome: Progressing     Problem: Hypertension Acute  Goal: Blood Pressure Within Desired Range  Outcome: Progressing     Problem: Oral Intake Inadequate  Goal: Improved Oral Intake  Outcome: Progressing     Problem: Hyperglycemia  Goal: Blood Glucose Level Within Targeted Range  Outcome: Progressing   Goal Outcome Evaluation:       Patient denied any pain this morning although stated that her left hip was bother her and her left foot. Repositioned and offloaded left heel, patient reported feeling better. BG stable, gets Insulin accordingly. Ate well for breakfast fed self with set up. Refused ankit supplement. K+, Mg+ and phosphorus all WNL will recheck levels in AM. BP stable on BP medication.  Awaiting placement.   Noted patient doing well eating her lunch almost 75%!

## 2023-03-18 NOTE — PLAN OF CARE
"Problem: Fall Injury Risk  Goal: Absence of Fall and Fall-Related Injury  Outcome: Progressing  Intervention: Identify and Manage Contributors  Recent Flowsheet Documentation  Taken 3/17/2023 2349 by Magdalene Sofia RN  Medication Review/Management: medications reviewed  Intervention: Promote Injury-Free Environment  Recent Flowsheet Documentation  Taken 3/17/2023 2349 by Magdalene Sofia RN  Safety Promotion/Fall Prevention:    assistive device/personal items within reach    bed alarm on    fall prevention program maintained    room door open    safety round/check completed   Goal Outcome Evaluation: No fall overnight. Oriented to self and situation. Didn't attempt to get out of bed. Doesn't want to move lower extremities at all. States \"I can't\". Uses call light appropriately     Sacral and right trochanter wound vac dressing C/D/I with vacuum at 125. Complains of repositioning at times, but allowed it. Heels floated.                        "

## 2023-03-19 NOTE — PLAN OF CARE
Patient is oriented to self. She is changed and repositioned q 2 hrs. Patient has more pain while laying on her Rt side. Wound vac at 125 mm/hg. Dressings intact. Large loose stool this shift. Patient ate 100% of dinner. Takes meds whole with applesauce.  Problem: Mobility Impairment  Goal: Optimal Mobility  Outcome: Progressing  Intervention: Optimize Mobility  Recent Flowsheet Documentation  Taken 3/18/2023 1700 by Cece Kuhn RN  Activity Management: activity adjusted per tolerance  Positioning/Transfer Devices:   pillows   in use     Problem: Electrolyte Imbalance  Goal: Electrolyte Imbalance: Plan of Care  3/18/2023 2242 by Cece Kuhn RN  Outcome: Progressing  3/18/2023 2236 by Cece Kuhn RN  Outcome: Progressing     Problem: Hypertension Acute  Goal: Blood Pressure Within Desired Range  3/18/2023 2243 by Cece Kuhn RN  Outcome: Progressing  3/18/2023 2242 by Cece Kuhn RN  Outcome: Progressing  Intervention: Normalize Blood Pressure  Recent Flowsheet Documentation  Taken 3/18/2023 1700 by Cece Kuhn RN  Sensory Stimulation Regulation:   care clustered   television on  Medication Review/Management: medications reviewed     Problem: Malnutrition  Goal: Improved Nutritional Intake  Outcome: Progressing     Problem: Hyperglycemia  Goal: Blood Glucose Level Within Targeted Range  Outcome: Progressing     Problem: Plan of Care - These are the overarching goals to be used throughout the patient stay.    Goal: Absence of Hospital-Acquired Illness or Injury  Intervention: Identify and Manage Fall Risk  Recent Flowsheet Documentation  Taken 3/18/2023 1700 by Cece Kuhn RN  Safety Promotion/Fall Prevention: bed alarm on  Intervention: Prevent Skin Injury  Recent Flowsheet Documentation  Taken 3/18/2023 1700 by Cece Kuhn RN  Body Position:   turned   left  Intervention: Prevent and Manage VTE (Venous Thromboembolism) Risk  Recent Flowsheet Documentation  Taken 3/18/2023 1700 by  Kuhn, Cece, RN  VTE Prevention/Management: patient refused intervention  Goal: Optimal Comfort and Wellbeing  Intervention: Monitor Pain and Promote Comfort  Recent Flowsheet Documentation  Taken 3/18/2023 2007 by Cece Kuhn RN  Pain Management Interventions: aromatherapy  Intervention: Provide Person-Centered Care  Recent Flowsheet Documentation  Taken 3/18/2023 1700 by Cece Kuhn RN  Trust Relationship/Rapport:   care explained   emotional support provided     Problem: Risk for Delirium  Goal: Improved Behavioral Control  Intervention: Prevent and Manage Agitation  Recent Flowsheet Documentation  Taken 3/18/2023 1700 by Cece Kuhn RN  Environment Familiarity/Consistency: daily routine followed  Intervention: Minimize Safety Risk  Recent Flowsheet Documentation  Taken 3/18/2023 1700 by Cece Kuhn RN  Enhanced Safety Measures: bed alarm set  Trust Relationship/Rapport:   care explained   emotional support provided  Goal: Improved Attention and Thought Clarity  Intervention: Maximize Cognitive Function  Recent Flowsheet Documentation  Taken 3/18/2023 1700 by Cece Kuhn RN  Sensory Stimulation Regulation:   care clustered   television on  Reorientation Measures:   clock in view   reorientation provided     Problem: Pain Acute  Goal: Optimal Pain Control and Function  Intervention: Develop Pain Management Plan  Recent Flowsheet Documentation  Taken 3/18/2023 2007 by Cece Kuhn RN  Pain Management Interventions: aromatherapy  Intervention: Prevent or Manage Pain  Recent Flowsheet Documentation  Taken 3/18/2023 1700 by Cece Kuhn RN  Sensory Stimulation Regulation:   care clustered   television on  Medication Review/Management: medications reviewed     Problem: Violence Risk or Actual  Goal: Anger and Impulse Control  Intervention: Minimize Safety Risk  Recent Flowsheet Documentation  Taken 3/18/2023 1700 by Cece Kuhn RN  Sensory Stimulation Regulation:   care clustered    television on  Enhanced Safety Measures: bed alarm set     Problem: Hypertension Acute  Goal: Blood Pressure Within Desired Range  Intervention: Normalize Blood Pressure  Recent Flowsheet Documentation  Taken 3/18/2023 1700 by Cece Kuhn RN  Sensory Stimulation Regulation:   care clustered   television on  Medication Review/Management: medications reviewed     Problem: Wound Healing Progression  Goal: Optimal Wound Healing  Intervention: Promote Wound Healing  Recent Flowsheet Documentation  Taken 3/18/2023 2007 by Cece Kuhn, RN  Pain Management Interventions: aromatherapy  Taken 3/18/2023 1700 by Cece Kuhn RN  Activity Management: activity adjusted per tolerance     Problem: Enteral Nutrition  Goal: Absence of Aspiration Signs and Symptoms  Intervention: Minimize Aspiration Risk  Recent Flowsheet Documentation  Taken 3/18/2023 1700 by Cece Kuhn RN  Head of Bed (HOB) Positioning: HOB at 20 degrees     Problem: Fall Injury Risk  Goal: Absence of Fall and Fall-Related Injury  Intervention: Identify and Manage Contributors  Recent Flowsheet Documentation  Taken 3/18/2023 1700 by Cece Kuhn RN  Medication Review/Management: medications reviewed  Intervention: Promote Injury-Free Environment  Recent Flowsheet Documentation  Taken 3/18/2023 1700 by Cece Kuhn RN  Safety Promotion/Fall Prevention: bed alarm on     Problem: Enteral Nutrition  Goal: Absence of Aspiration Signs and Symptoms  Intervention: Minimize Aspiration Risk  Recent Flowsheet Documentation  Taken 3/18/2023 1700 by Cece Kuhn RN  Head of Bed (HOB) Positioning: HOB at 20 degrees   Goal Outcome Evaluation:

## 2023-03-19 NOTE — PLAN OF CARE
Problem: Risk for Delirium  Goal: Improved Behavioral Control  Intervention: Minimize Safety Risk  Recent Flowsheet Documentation  Taken 3/19/2023 0004 by Magdalene Sofia RN  Enhanced Safety Measures: bed alarm set   Goal Outcome Evaluation: No s/sx of delirium    Problem: Pain Acute  Goal: Optimal Pain Control and Function  Intervention: Prevent or Manage Pain  Recent Flowsheet Documentation  Taken 3/19/2023 0004 by Magdalene Sofia RN  Medication Review/Management: medications reviewed  Denies pain     Patient slept well in between cares. Incontinent bowel & bladder. Turned about q3h.

## 2023-03-19 NOTE — PLAN OF CARE
Problem: Electrolyte Imbalance  Goal: Electrolyte Imbalance: Plan of Care  Outcome: Progressing  Flowsheets (Taken 3/19/2023 1048)  Electrolyte Imbalance: Plan of Care: electrolyte balance     Problem: Hypertension Acute  Goal: Blood Pressure Within Desired Range  Outcome: Progressing  Intervention: Normalize Blood Pressure  Recent Flowsheet Documentation  Taken 3/19/2023 0932 by Katy Cooley RN  Sensory Stimulation Regulation:    care clustered    television on  Medication Review/Management: medications reviewed     Problem: Oral Intake Inadequate  Goal: Improved Oral Intake  Outcome: Progressing     Problem: Hyperglycemia  Goal: Blood Glucose Level Within Targeted Range  Outcome: Progressing   Goal Outcome Evaluation:       Patient looks great today! Sitting up in bed eating breakfast. Ate 100% of food. Refused ankit. Medications with applesauce. BG, BP stable managed with medications. K+, Mg+ and phosphorus protocols. Will get IV dose of phosphorus today and all levels will be rechecked in AM.   Patient ate 100% of lunch. Complained of left hip hurting 10/10 repositioned and given 2.5 oxycodone.   Continue to offer encouragement and emotional support.  Awaiting placement.

## 2023-03-19 NOTE — PROGRESS NOTES
Hospitalist Progress Note        CODE STATUS:  Full Code    Assessment/Plan:    Cristal Preciado is a 72-year-old woman, bedbound nursing home resident with history of sacral decubitus ulcer (present on admission), CVA, dementia, neuropathy, DM2, CKD, CAD, failure to thrive admitted on 2/16/2023 with infected new sacral decubitus ulcer vs possible necrotizing fasciitis.  She underwent I&D (2/17) with necrotizing tissue down to the sacrum and  with osteomyelitis.       Hospital course has been complicated by anemia requiring blood transfusion and malnutrition due to poor oral intake; She had a trial of NG feeds with calorie count. She has been eating 100% of all three meals a day and so NGT/Tube feeds were discontinued Mar 15. She continues to maintain good PO intake. She is waiting for placement.     Severe sepsis secondary to infected stage IV sacral and right greater trochanter decubitus ulcers with sacral osteomyelitis  - Hemodynamically stable  - Continue Zosyn x 4-6 weeks from source control (2/17/23).  PICC placed.  - Wound VAC  - Nutritional support     Moderate malnutrition, weight loss, failure to thrive  - Review of prior notes regarding EGD, alternative feeds.  Per previous providers discussion with Court Ms. Preciado, patient has had a bad experience with G-tube in family and would like to start with NG.    - NG placed 3/9/23 and started TF.    - She has been completing 100% of all three meals daily. NJ tube and tube feeds discontinued Mar 15   - I have tried to call patient's daughter daily since Mar 15. Voicemail full.    - Monitor Mg, phos, K for refeeding.     Acute blood loss anemia: Hgb was 5.7 after surgery. Transfused 2 units of packed RBCs.  Hemoglobin has been stable.  - On 3/12/2023, hemoglobin dropped to 6.4 for which she was transfused with 1 unit of PRBC.  - Hgb stable  - Monitor hemoglobin and transfuse as needed    Type II DM  Hyperglycemia  - HgbA1c on 1/3/2023 was 7.6 %  - Continue  Lantus. Continue metformin, twice daily  - Adjustment doses as needed  - Continue Novolog sliding scale.   - PTA Victoza  hold.     HTN  - Cont Metoprolol     History of CAD  - Continue Metoprolol, lipitor, asa      Goal of care:   - Honoring choices filled out nominating daughter Court as surrogate if needed for decisions as of 3/7/23.  - Have not been able to speak to Court. She is not aware the NGT was removed. Her number rings 1-2x and then straight to a full mailbox.      Hyponatremia, hypokalemia, hypomagnesemia  - Monitor electrolytes and replace as per protocol     Hypercalcemia   - Mild on admission and probably from immobility vs. dehydration.  Resolved     Pressure ulcer on bilateral heels, unstageable: Present on admission. L heel: 4  x 5.5 cm  and R heel: 2  x 2 cm, the skin surface is black.  - Wound care consulted. On offload boots.  Regular turning in bed     History of ulcer, GERD  - Cont PTA PPI     Hyperlipidemia  - Cont Lipitor      DVT Prophylaxis: Lovenox    Disposition/Barrier to discharge;  Placement    Subjective:  Interval History:   Patient seen and examined.   No events overnight.  She is doing well.  She is AAOx3.      Review of Systems:   As mentioned in subjective.    Patient Active Problem List   Diagnosis     Cataract     CAD (coronary artery disease)     Diabetic nephropathy (H)     Diabetic neuropathy (H)     Diabetic retinopathy (H)     GERD (gastroesophageal reflux disease)     Glaucoma     Hypertension     Hyperlipidemia     H/O: stroke     Anemia     Seasonal allergies     Depression     Tubular adenoma of colon     Leg weakness     Dermatitis, seborrheic     Eczematous dermatitis     History of coronary artery disease     Venous stasis dermatitis of both lower extremities     Chronic dermatitis of hands     Neoplasm of uncertain behavior of skin     S/P hysterectomy     Hypomagnesemia     Gout     DM type 2 w Neuro/Retin/Nephr -- hgb A1C 7.6 on 1/3/23     Stage 3  chronic kidney disease, unspecified whether stage 3a or 3b CKD (H)     Acute chest pain     Chronic kidney disease, stage 2 (mild)     Acute metabolic encephalopathy     Oropharyngeal dysphagia     Sacral decubitus ulcer     Dehydration     Confusion     Adult failure to thrive     Acute kidney injury (H)     Cognitive impairment Consistent with Mild Dementia     Polyneuropathy associated with underlying disease (H)     Acute cystitis with hematuria     Delirium     Protein-calorie malnutrition (H)     Wound infection     Sacral wound, initial encounter     Severe sepsis (H)     Hypokalemia     Hypercalcemia     Anemia due to blood loss, acute     Pressure injury of contiguous region involving buttock and hip, stage 4 (H)     Necrotizing fasciitis (H)     Hypoglycemia     Pressure ulcer of both heels, unstageable (H)       Scheduled Meds:    amoxicillin-clavulanate  1 tablet Oral Q12H [facility] (08/20)     aspirin  81 mg Oral or NG Tube Daily     atorvastatin  80 mg Oral or NG Tube QPM     diclofenac  2 g Topical 4x Daily     dorzolamide-timolol  1 drop Both Eyes BID     DULoxetine  90 mg Oral or NG Tube QAM     enoxaparin ANTICOAGULANT  30 mg Subcutaneous Q24H     insulin aspart  1-10 Units Subcutaneous TID AC     insulin aspart  1-7 Units Subcutaneous At Bedtime     insulin glargine  13 Units Subcutaneous At Bedtime     insulin glargine  7 Units Subcutaneous QAM AC     Terrence  1 packet Oral BID w/meals     latanoprost  1 drop Both Eyes At Bedtime     metFORMIN  500 mg Oral BID w/meals     metoprolol tartrate  50 mg Oral or NG Tube BID     mirtazapine  15 mg Oral or NG Tube At Bedtime     multivitamin w/minerals  1 tablet Oral or Feeding Tube Daily     olopatadine  1 drop Both Eyes Daily     pantoprazole  40 mg Oral QAM AC     polyvinyl alcohol  1 drop Both Eyes TID     sodium chloride (PF)  10-30 mL Intracatheter Q8H     thiamine  100 mg Oral or NG Tube Daily     traZODone  50 mg Oral or NG Tube At Bedtime     Continuous  Infusions:    - MEDICATION INSTRUCTIONS -       PRN Meds:.sodium chloride 0.9%, acetaminophen, glucose **OR** dextrose **OR** glucagon, diphenhydrAMINE **OR** diphenhydrAMINE, lidocaine 4%, lidocaine, lidocaine (buffered or not buffered), melatonin, methocarbamol, naloxone **OR** naloxone **OR** naloxone **OR** naloxone, nitroGLYcerin, oxyCODONE, - MEDICATION INSTRUCTIONS -, senna-docusate, simethicone, sodium chloride (PF)    Objective:  Vital signs in last 24 hours:  Temp:  [98.1  F (36.7  C)-98.3  F (36.8  C)] 98.1  F (36.7  C)  Pulse:  [72-94] 94  Resp:  [18] 18  BP: (107-184)/(55-78) 107/55  SpO2:  [98 %-99 %] 99 %      Physical Exam:  General: In NAD  HEENT: NCAT & EOMI.  CV: Normal S1S2, regular rhythm, normal rate  Lungs: CTAB  Abdomen: Soft, NT, ND, +BS  Extremities: No LE edema b/l  Neuro: AAOx3    Lab Results:    Recent Results (from the past 24 hour(s))   Glucose by meter    Collection Time: 03/18/23 12:33 PM   Result Value Ref Range    GLUCOSE BY METER POCT 265 (H) 70 - 99 mg/dL   Glucose by meter    Collection Time: 03/18/23  5:19 PM   Result Value Ref Range    GLUCOSE BY METER POCT 191 (H) 70 - 99 mg/dL   Glucose by meter    Collection Time: 03/18/23  9:05 PM   Result Value Ref Range    GLUCOSE BY METER POCT 232 (H) 70 - 99 mg/dL   Hemoglobin and hematocrit    Collection Time: 03/19/23  8:19 AM   Result Value Ref Range    Hemoglobin 8.1 (L) 11.7 - 15.7 g/dL    Hematocrit 26.9 (L) 35.0 - 47.0 %   Potassium    Collection Time: 03/19/23  8:19 AM   Result Value Ref Range    Potassium 3.9 3.4 - 5.3 mmol/L   Creatinine    Collection Time: 03/19/23  8:19 AM   Result Value Ref Range    Creatinine 0.87 0.51 - 0.95 mg/dL    GFR Estimate 71 >60 mL/min/1.73m2   Magnesium    Collection Time: 03/19/23  8:19 AM   Result Value Ref Range    Magnesium 1.6 (L) 1.7 - 2.3 mg/dL   Phosphorus    Collection Time: 03/19/23  8:19 AM   Result Value Ref Range    Phosphorus 2.5 2.5 - 4.5 mg/dL       Serum Glucose range:   Recent  Labs   Lab 03/18/23  2105 03/18/23  1719 03/18/23  1233 03/18/23  0720   * 191* 265* 156*     ABG: No lab results found in last 7 days.  CBC:   Recent Labs   Lab 03/19/23  0819 03/18/23  0500 03/17/23  0658   HGB 8.1* 7.7* 8.7*   HCT 26.9* 25.4* 28.7*     Chemistry:   Recent Labs   Lab 03/19/23  0819 03/18/23  0500 03/17/23  1455 03/17/23  0658 03/13/23  1555 03/13/23  0620   POTASSIUM 3.9 4.0  --  3.8   < > 4.0   CR 0.87  --   --   --   --   --    GFRESTIMATED 71  --   --   --   --   --    MAG 1.6* 1.7 2.2 1.5*   < > 1.4*   PROTTOTAL  --   --   --   --   --  5.4*   ALBUMIN  --   --   --   --   --  2.0*   AST  --   --   --   --   --  60*   ALT  --   --   --   --   --  32   ALKPHOS  --   --   --   --   --  248*   BILITOTAL  --   --   --   --   --  0.2    < > = values in this interval not displayed.     Coags:  No results for input(s): INR, PROTIME, PTT in the last 168 hours.    Invalid input(s): APTT  Cardiac Markers:  No results for input(s): CKTOTAL, TROPONINI in the last 168 hours.               03/19/2023   Cyn Madera MD  Hospitalist  Pager: [phone]

## 2023-03-20 NOTE — PROGRESS NOTES
Hospitalist Progress Note        CODE STATUS:  Full Code    Assessment/Plan:    Cristal Preciado is a 72-year-old woman, bedbound nursing home resident with history of sacral decubitus ulcer (present on admission), CVA, dementia, neuropathy, DM2, CKD, CAD, failure to thrive admitted on 2/16/2023 with infected new sacral decubitus ulcer vs possible necrotizing fasciitis.  She underwent I&D (2/17) with necrotizing tissue down to the sacrum and  with osteomyelitis.       Hospital course has been complicated by anemia requiring blood transfusion and malnutrition due to poor oral intake; She had a trial of NG feeds with calorie count. She has been eating 100% of all three meals a day and so NGT/Tube feeds were discontinued Mar 15. She continues to maintain good PO intake. She is waiting for placement.     Severe sepsis secondary to infected stage IV sacral and right greater trochanter decubitus ulcers with sacral osteomyelitis  - Hemodynamically stable  - Continue Zosyn x 4-6 weeks from source control (2/17/23).  PICC placed.  - Wound VAC  - Nutritional support     Moderate malnutrition, weight loss, failure to thrive  - Review of prior notes regarding EGD, alternative feeds.  Per previous providers discussion with Court Ms. Preciado, patient has had a bad experience with G-tube in family and would like to start with NG.    - NG placed 3/9/23 and started TF.    - She has been completing 100% of all three meals daily. NJ tube and tube feeds discontinued Mar 15   - I have tried to call patient's daughter daily since Mar 15. Voicemail full.    - Monitor Mg, phos, K for refeeding.     Acute blood loss anemia: Hgb was 5.7 after surgery. Transfused 2 units of packed RBCs.  Hemoglobin has been stable.  - On 3/12/2023, hemoglobin dropped to 6.4 for which she was transfused with 1 unit of PRBC.  - Hgb stable  - Monitor hemoglobin and transfuse as needed    Type II DM  Hyperglycemia  - HgbA1c on 1/3/2023 was 7.6 %  - Continue  Lantus. Continue metformin, twice daily  - Adjustment doses as needed  - Continue Novolog sliding scale.   - PTA Victoza  hold.     HTN  - Cont Metoprolol     History of CAD  - Continue Metoprolol, lipitor, asa      Goal of care:   - Honoring choices filled out nominating daughter Court as surrogate if needed for decisions as of 3/7/23.  - Have not been able to speak to Court. She is not aware the NGT was removed. Her number rings 1-2x and then straight to a full mailbox.      Hyponatremia, hypokalemia, hypomagnesemia  - Monitor electrolytes and replace as per protocol     Hypercalcemia   - Mild on admission and probably from immobility vs. dehydration.  Resolved     Pressure ulcer on bilateral heels, unstageable: Present on admission. L heel: 4  x 5.5 cm  and R heel: 2  x 2 cm, the skin surface is black.  - Wound care consulted. On offload boots.  Regular turning in bed     History of ulcer, GERD  - Cont PTA PPI     Hyperlipidemia  - Cont Lipitor      DVT Prophylaxis: Lovenox    Disposition/Barrier to discharge;  Placement    Subjective:  Interval History:   Patient seen and examined.   No events overnight.  She is AAOx3.      Review of Systems:   As mentioned in subjective.    Patient Active Problem List   Diagnosis     Cataract     CAD (coronary artery disease)     Diabetic nephropathy (H)     Diabetic neuropathy (H)     Diabetic retinopathy (H)     GERD (gastroesophageal reflux disease)     Glaucoma     Hypertension     Hyperlipidemia     H/O: stroke     Anemia     Seasonal allergies     Depression     Tubular adenoma of colon     Leg weakness     Dermatitis, seborrheic     Eczematous dermatitis     History of coronary artery disease     Venous stasis dermatitis of both lower extremities     Chronic dermatitis of hands     Neoplasm of uncertain behavior of skin     S/P hysterectomy     Hypomagnesemia     Gout     DM type 2 w Neuro/Retin/Nephr -- hgb A1C 7.6 on 1/3/23     Stage 3 chronic kidney disease,  unspecified whether stage 3a or 3b CKD (H)     Acute chest pain     Chronic kidney disease, stage 2 (mild)     Acute metabolic encephalopathy     Oropharyngeal dysphagia     Sacral decubitus ulcer     Dehydration     Confusion     Adult failure to thrive     Acute kidney injury (H)     Cognitive impairment Consistent with Mild Dementia     Polyneuropathy associated with underlying disease (H)     Acute cystitis with hematuria     Delirium     Protein-calorie malnutrition (H)     Wound infection     Sacral wound, initial encounter     Severe sepsis (H)     Hypokalemia     Hypercalcemia     Anemia due to blood loss, acute     Pressure injury of contiguous region involving buttock and hip, stage 4 (H)     Necrotizing fasciitis (H)     Hypoglycemia     Pressure ulcer of both heels, unstageable (H)       Scheduled Meds:    amoxicillin-clavulanate  1 tablet Oral Q12H [facility] (08/20)     aspirin  81 mg Oral or NG Tube Daily     atorvastatin  80 mg Oral or NG Tube QPM     diclofenac  2 g Topical 4x Daily     dorzolamide-timolol  1 drop Both Eyes BID     DULoxetine  90 mg Oral or NG Tube QAM     enoxaparin ANTICOAGULANT  30 mg Subcutaneous Q24H     insulin aspart  1-10 Units Subcutaneous TID AC     insulin aspart  1-7 Units Subcutaneous At Bedtime     insulin glargine  13 Units Subcutaneous At Bedtime     [START ON 3/21/2023] insulin glargine  15 Units Subcutaneous QAM AC     insulin glargine  5 Units Subcutaneous Once     Terrence  1 packet Oral BID w/meals     latanoprost  1 drop Both Eyes At Bedtime     metFORMIN  750 mg Oral BID w/meals     metoprolol tartrate  50 mg Oral or NG Tube BID     mirtazapine  15 mg Oral or NG Tube At Bedtime     multivitamin w/minerals  1 tablet Oral or Feeding Tube Daily     olopatadine  1 drop Both Eyes Daily     pantoprazole  40 mg Oral QAM AC     polyvinyl alcohol  1 drop Both Eyes TID     sodium chloride (PF)  10-30 mL Intracatheter Q8H     thiamine  100 mg Oral or NG Tube Daily     traZODone   50 mg Oral or NG Tube At Bedtime     Continuous Infusions:    - MEDICATION INSTRUCTIONS -       PRN Meds:.sodium chloride 0.9%, acetaminophen, glucose **OR** dextrose **OR** glucagon, diphenhydrAMINE **OR** diphenhydrAMINE, lidocaine 4%, lidocaine, lidocaine (buffered or not buffered), melatonin, methocarbamol, naloxone **OR** naloxone **OR** naloxone **OR** naloxone, nitroGLYcerin, oxyCODONE, - MEDICATION INSTRUCTIONS -, senna-docusate, simethicone, sodium chloride (PF)    Objective:  Vital signs in last 24 hours:  Temp:  [97.8  F (36.6  C)-99.2  F (37.3  C)] 99.2  F (37.3  C)  Pulse:  [70-94] 76  Resp:  [18] 18  BP: (107-157)/(55-76) 143/67  SpO2:  [95 %-100 %] 95 %      Physical Exam:  General: In NAD  HEENT: NCAT & EOMI.  CV: Normal S1S2, regular rhythm, normal rate  Lungs: CTAB  Abdomen: Soft, NT, ND, +BS  Extremities: No LE edema b/l  Neuro: AAOx3    Lab Results:    Recent Results (from the past 24 hour(s))   Glucose by meter    Collection Time: 03/19/23  8:36 AM   Result Value Ref Range    GLUCOSE BY METER POCT 194 (H) 70 - 99 mg/dL   Glucose by meter    Collection Time: 03/19/23 12:18 PM   Result Value Ref Range    GLUCOSE BY METER POCT 225 (H) 70 - 99 mg/dL   Glucose by meter    Collection Time: 03/19/23  4:52 PM   Result Value Ref Range    GLUCOSE BY METER POCT 288 (H) 70 - 99 mg/dL   Glucose by meter    Collection Time: 03/19/23  8:39 PM   Result Value Ref Range    GLUCOSE BY METER POCT 225 (H) 70 - 99 mg/dL   Hemoglobin and hematocrit    Collection Time: 03/20/23  5:21 AM   Result Value Ref Range    Hemoglobin 7.9 (L) 11.7 - 15.7 g/dL    Hematocrit 26.3 (L) 35.0 - 47.0 %   Potassium    Collection Time: 03/20/23  5:21 AM   Result Value Ref Range    Potassium 3.8 3.4 - 5.3 mmol/L   Magnesium    Collection Time: 03/20/23  5:21 AM   Result Value Ref Range    Magnesium 1.5 (L) 1.7 - 2.3 mg/dL   Phosphorus    Collection Time: 03/20/23  5:21 AM   Result Value Ref Range    Phosphorus 2.8 2.5 - 4.5 mg/dL        Serum Glucose range:   Recent Labs   Lab 03/19/23  2039 03/19/23  1652 03/19/23  1218 03/19/23  0836   * 288* 225* 194*     ABG: No lab results found in last 7 days.  CBC:   Recent Labs   Lab 03/20/23  0521 03/19/23  0819 03/18/23  0500   HGB 7.9* 8.1* 7.7*   HCT 26.3* 26.9* 25.4*     Chemistry:   Recent Labs   Lab 03/20/23  0521 03/19/23  0819 03/18/23  0500   POTASSIUM 3.8 3.9 4.0   CR  --  0.87  --    GFRESTIMATED  --  71  --    MAG 1.5* 1.6* 1.7     Coags:  No results for input(s): INR, PROTIME, PTT in the last 168 hours.    Invalid input(s): APTT  Cardiac Markers:  No results for input(s): CKTOTAL, TROPONINI in the last 168 hours.               03/20/2023   Cyn Madera MD  Hospitalist  Pager: [phone]

## 2023-03-20 NOTE — PLAN OF CARE
Patient is A&O. She is cooperative with [facility]. She takes her meds 1 at a time in applesauce.  Changed and repositioned q 2 hrs. Patient is resistant to repositioning d/t pain. Given prn Oxycodone 2.5 mg with relief.  Patient ate 100% of dinner. She had 2 loose stools this shift and is incontinent of urine.  Problem: Mobility Impairment  Goal: Optimal Mobility  Outcome: Progressing     Problem: Electrolyte Imbalance  Goal: Electrolyte Imbalance: Plan of Care  Outcome: Progressing     Problem: Hypertension Acute  Goal: Blood Pressure Within Desired Range  Outcome: Progressing  Intervention: Normalize Blood Pressure  Recent Flowsheet Documentation  Taken 3/19/2023 1700 by Cece Kuhn RN  Sensory Stimulation Regulation: care clustered  Medication Review/Management: medications reviewed     Problem: Oral Intake Inadequate  Goal: Improved Oral Intake  Outcome: Progressing     Problem: Plan of Care - These are the overarching goals to be used throughout the patient stay.    Goal: Absence of Hospital-Acquired Illness or Injury  Intervention: Identify and Manage Fall Risk  Recent Flowsheet Documentation  Taken 3/19/2023 1700 by Cece Kuhn RN  Safety Promotion/Fall Prevention: assistive device/personal items within reach  Goal: Optimal Comfort and Wellbeing  Intervention: Provide Person-Centered Care  Recent Flowsheet Documentation  Taken 3/19/2023 1700 by Cece Kuhn RN  Trust Relationship/Rapport:   care explained   emotional support provided   empathic listening provided     Problem: Risk for Delirium  Goal: Improved Behavioral Control  Intervention: Minimize Safety Risk  Recent Flowsheet Documentation  Taken 3/19/2023 1700 by Cece Kuhn RN  Enhanced Safety Measures: bed alarm set  Trust Relationship/Rapport:   care explained   emotional support provided   empathic listening provided  Goal: Improved Attention and Thought Clarity  Intervention: Maximize Cognitive Function  Recent Flowsheet  Documentation  Taken 3/19/2023 1700 by Cece Kuhn RN  Sensory Stimulation Regulation: care clustered  Reorientation Measures:   clock in view   reorientation provided     Problem: Pain Acute  Goal: Optimal Pain Control and Function  Intervention: Prevent or Manage Pain  Recent Flowsheet Documentation  Taken 3/19/2023 1700 by Cece Kuhn RN  Sensory Stimulation Regulation: care clustered  Medication Review/Management: medications reviewed     Problem: Violence Risk or Actual  Goal: Anger and Impulse Control  Intervention: Minimize Safety Risk  Recent Flowsheet Documentation  Taken 3/19/2023 1700 by Cece Kuhn RN  Sensory Stimulation Regulation: care clustered  Enhanced Safety Measures: bed alarm set     Problem: Hypertension Acute  Goal: Blood Pressure Within Desired Range  Intervention: Normalize Blood Pressure  Recent Flowsheet Documentation  Taken 3/19/2023 1700 by Cece Kuhn RN  Sensory Stimulation Regulation: care clustered  Medication Review/Management: medications reviewed     Problem: Fall Injury Risk  Goal: Absence of Fall and Fall-Related Injury  Intervention: Identify and Manage Contributors  Recent Flowsheet Documentation  Taken 3/19/2023 1700 by Cece Kuhn RN  Medication Review/Management: medications reviewed  Intervention: Promote Injury-Free Environment  Recent Flowsheet Documentation  Taken 3/19/2023 1700 by Cece Kuhn RN  Safety Promotion/Fall Prevention: assistive device/personal items within reach   Goal Outcome Evaluation:

## 2023-03-20 NOTE — PROGRESS NOTES
[facility] Nurse Inpatient Assessment     Consulted for: Buttocks, heels    Patient History (according to provider note(s):      Cristal Preciado is a 72 year old female with PMH significant for known sacral decubitus ulcer (present on admission), CVA, dementia, neuropathy, DM2, CKD, CAD and FTT admitted on 2/16/2023 with infected new sacral decubitus ulcer with possible necrotizing fasciitis.      1. Sepsis- Admit patient. Infected Sacral Decubitus ulcer/Possible Necrotizing fasciits,UTI,Lactic acid= 6.4, WBC= 14.1. CT pelvis report reviewed. ED physician has already been in contact with General Surgeon on call and patient will be seen in consultation. Continue Vancomycin, Zosyn and Clindamycin. Continue IV fluids. Currently NPO. Repeat labs in a.m. Monitor vitals closely. Will make further recommendations based on clinical course.     2. Possible Necrotizing Fasciitis- ED has contacted General Surgeon on call and patient will be seen in consultation. Continue Vancomycin, Zosyn and Clindamycin. Plan per surgeon.    Areas Assessed:      Pressure Injury Location: Sacrum  (additional pictures available in media tab)                                                           3/10                                                                    3/13                                                                    3/20      Wound type: Pressure Injury     Pressure Injury Stage: 4, present on admission   Wound history/plan of care:   Patient admitted with worsening pressure wounds, was taken for an I&D  Wound base: 30 % slough and bone, 70 % dermis, granulation tissue, non-granular tissue and adipose tissue, more granulation today, likely assisted by NG/TF for nutrition.      Palpation of the wound bed: boggy      Drainage: small     Description of drainage: serosanguinous     Measurements (length x width x depth, in cm) 9  x 12  x  3 cm      Tunneling N/A     Undermining up to 1.5 cm from  8-11 o'clock  Periwound skin: Intact      Color: normal and consistent with surrounding tissue, slight purple discoloration near gluteal cleft      Temperature: normal   Odor: none  Pain: mild, with palpation and during dressing change  Pain intervention prior to dressing change: slow and gentle cares , PO pain medication given by RN  Treatment goal: Heal , Increase granulation and Protection  STATUS: evolving  Supplies ordered: gathered    My PI Risk Assessment     Sensory Perception: 2 - Very Limited     Moisture: 1 - Constantly moist     Activity: 1 - Bedfast      Mobility: 2 - Very limited     Nutrition: 2 - Probably inadequate      Friction/Shear: 1 - Problem     TOTAL: 9  __________________________________________________________________________________________________________________  Pressure Injury Location: [facility]               3/10                                                                       3/13                                                                 3/20       Wound type: Pressure Injury     Pressure Injury Stage: 4, present on admission   Wound history/plan of care:   See above  Wound base: 30 % slough and bone, 70 % subcutaneous tissue, some granulation starting       Palpation of the wound bed: normal      Drainage: scant     Description of drainage: serosanguinous     Measurements (length x width x depth, in cm) 10  x 12  x  2 cm      Tunneling N/A     Undermining N/A  Periwound skin: Intact      Color: normal and consistent with surrounding tissue      Temperature: normal   Odor: none  Pain: mild,   Pain intervention prior to dressing change: slow and gentle cares   Treatment goal: Heal , Increase granulation and Protection  STATUS: edges are more defined, thinning yellow slough at center over bone  Supplies ordered: ordered additional large dressing and rings    ________________________________________________________________________________________________________________    Negative pressure wound therapy applied to: Sacrum & KIMBERLY fox   Last photo: see above  Wound due to: Pressure Injury   Wound history/plan of care:      Surgical date: 2/18/23     Date Negative Pressure Wound Therapy initiated: 2/20/23     Interventions in place: repositioning, pressure redistribution with device or dressing, specialty surface in use, moisture/incontinence management and offloading    Is patient s nutritional status compromised? yes   a. If yes, what interventions are in place? Protein supplements    Reason for initiating vac therapy? Presence of co-morbidities, High risk of infections, Need for accelerated granulation tissue and Prior history of delayed wound healing    Which?of?the?following?co-morbidities?apply? Immobility  a. If diabetic is patient on a diabetic management program? N/A     Is osteomyelitis present in wound? Y  a.  If yes what treatments are in place? N/A    Number of foam pieces removed from a wound (excluding foam for bridge) : 3 black foam to right hip (1 extra to fill the depth at the center of the wound  Verified this matched the number of foam pieces applied last dressing change: no    Number of foam pieces packed into trochanter (excluding foam for bridge) : 1   To sacral wound: 1 black foam and ring along distal edge to prevent intrusion of stool, vashe soak x 10 minutes. Bridge between both wounds    _____________________________________________________________________________________________________________________________________________________________________    Pressure Injury Location: Bilateral heels          2/20 L heel                                                         2/27                                                                           3/13                                                             3/20            2/20 R heel    "                                                      2/27                                                                     3/13                                                                      3/20      Wound type: Pressure Injury     Pressure Injury Stage: Unstageable, present on admission      Wound history/plan of care:   See above  Wound base: 100 % eschar     Palpation of the wound bed: firm      Drainage: none     Description of drainage: none     Measurements (length x width x depth, in cm)   L heel: 4  x 5.5 cm    R heel: 2  x 2 cm      Tunneling N/A     Undermining N/A  Periwound skin: Intact and Scar tissue      Color: pink      Temperature: normal   Odor: none  Pain: absent, none  Pain intervention prior to dressing change: slow and gentle cares   Treatment goal: Maintain (prevention of deterioration) and offload pressure, reduce friction and shear  STATUS: stable   Supplies ordered: supplies stored on unit - no change in care needed        Treatment Plan:     Negative pressure wound therapy plan:  Wound location: Sacrum + R Trocanter    Change Days: Mon/Wed/Fri by WOC RN    Supplies (including all accessories) used: large Black foam , Adapt barrier ring and Cavilon no sting barrier film  Cleanse with Vashe prior to replacing VAC    Suction setting: -125   Methods used: Window paned all periwound skin with vac drape prior to applying sponge and barrier rings around entire sacral wound to protect from stool     Staff RN to assess integrity of dressing and ensure suction is set at appropriate level every shift.   Date canister. Chart canister output every shift. Change cannister weekly and PRN if full/occluded.    Remove foam dressing and replace with BID normal saline moist gauze dressing if:   -a dressing failure which cannot be repaired within 2 hours   -patient is discharging to home without a home pump   -patient is discharging to a facility outside the local area   -if a dressing is a \"Silver " "Foam\", remove before Radiation Therapy or MRI     The hospital VAC pump is not to be discharged with the patient.?Ensure to disconnect patient from machine prior to discharge. Then,    - If a home KCI VAC pump has been delivered, connect home cannister to dressing tubing then connect cannister to home pump and turn on machine    - If transferring to a nearby facility with a KCI vac, can disconnect and clamp tubing then cover end with glove so can be reconnected within 2 hours      Heels  1. Paint with betadine daily, allow to dry  2. Offload heels at all times while in bed, utilize prevlon boots    RECOMMEND PRIMARY TEAM ORDER: None, at this time  Education provided: importance of repositioning, wound progress, Infection prevention , Moisture management and Off-loading pressure  Discussed plan of care with: Patient and Nurse  WOC nurse follow-up plan: Monday/WednesdayFriday  Notify WOC if wound(s) deteriorate.  Nursing to notify the Provider(s) and re-consult the WOC Nurse if new skin concern.    DATA:     Current support surface: Standard  Standard gel/foam mattress (IsoFlex, Atmos air, etc)  Containment of urine/stool: Incontinence Protocol  BMI: Body mass index is 20.3 kg/m .   Active diet order: Orders Placed This Encounter      Regular Diet Adult     Output: I/O last 3 completed shifts:  In: 1440 [P.O.:1440]  Out: 200 [Urine:200]     Labs:   Recent Labs   Lab 03/20/23  0521   HGB 7.9*     Pressure injury risk assessment:   Sensory Perception: 3-->slightly limited  Moisture: 2-->very moist  Activity: 1-->bedfast  Mobility: 2-->very limited  Nutrition: 2-->probably inadequate  Friction and Shear: 2-->potential problem  Lalit Score: 12    MUNIRA Gomez RN CWOCN  Pager no longer is use, please contact through [employer] group: [facility] [email or web address] [facility]      "

## 2023-03-20 NOTE — PLAN OF CARE
Problem: Pain Acute  Goal: Optimal Pain Control and Function  Intervention: Prevent or Manage Pain  Recent Flowsheet Documentation  Taken 3/20/2023 0045 by Magdalene Sofia RN  Medication Review/Management: medications reviewed   Goal Outcome Evaluation: Patient reports pain with repositioning and incontinence care, otherwise is sleeping quietly.     Wound vac dressings c/d/I. Turned as patient would allow. Incontinent bowel and bladder.

## 2023-03-20 NOTE — PROGRESS NOTES
Nutrition Therapy       RD Recommendations to MD  - Recommend culturelle for abx associated diarrhea    RD Recommendations already implemented  - Zero bed with next weight  - Discontinue thiamine with adequate intake > 1 week   - Change terrence flavor to fruit punch (was receiving unflavored previous put down fT)  - Trial Gel plus daily     RD future recommendation:  - if pt continues to refuse terrence will order vit C 500 mg daily to promote wound healing  -Adjust supplement pending intake, tolerance BG, wound healing, weight      EVALUATION OF PROGRESS TOWARDS GOALS     Current Nutrition Intake:  Diet: regular diet    Supplement: magic cup bid, Terrence bid    Intake: Good, >75% of meals  Estimate intake with magic cup (pt refusing terrence since 3/18) 0661-4743 kcal,  g protein/day meeting >100% of estimated kcal and % of estimated protein needs  Good  Fluid intake, 1440 ml yesterday    NG removed 3/15 with improved intake    Weight Trends  Admission wt: 78.1 kg (172 lb 2.9 oz)    Current wt:  58.8 kg (129 lb 10.1 oz) 3/19- question accuracy - 31 lb weight loss x 1 week? Pt receiving TF during this time     Dosing Weight:  65.6 kg (adjusted wt)    ASSESSED NUTRITION NEEDS:  Estimated Energy Needs: 5893-8460 kcals/day (25 - 30 kcals/kg)  Justification: Maintenance  Estimated Protein Needs: 99-131grams protein/day (1.5-2.0grams of pro/kg)  Justification: Wound healing- reassessed 3/13 for increased needs  Estimated Fluid Needs: 1968 mL/day ({30 ml/Kg)   Justification: Maintenance     GI:  BM: 2-3 loose BM/day    Labs:   Mag 1.5 (L), decreased  Glucose monitorin-288 mg/dL- in poor control- lantus increased today    Medications:   Oral abx, lipitor, ssi, lantus bid, metformin, mvi with minerals, protonix, thiamine    SKIN  Per WOC 3/17  Sacral stg 4 - evolving  Tronchanter stg 4 - improving  Bilateral heels unstageable PI - stable      Nutrition Diagnosis  Malnutrition related to poor intake as evidenced by  less than 50% food intake since admission (likely poor po intake for a while PTA per conversation).   Evaluation: progressing; started TF on 3/8        INTERVENTIONS  Implementation  - Recommend culturelle for abx associated diarrhea  - Zero bed with next weight  - Discontinue thiamine with adequate intake > 1 week   - Change ankit flavor to fruit punch (was receiving unflavored previous put down fT)  - if pt continues to refuse ankit will order vit C 500 mg daily to promote wound healing  -Trial Gel plus daily     Current Goals:  Glucose <180 mg/dL- not progressing   P.o intake > 75% of estimated needs - progressing  Wound healing - progressing    Monitoring/Evaluation  Wound healing, weight, intake, supplement acceptance, labs, bowels

## 2023-03-21 NOTE — PROGRESS NOTES
Care Management Follow Up    Length of Stay (days): 32    Expected Discharge Date: 03/21/2023     Concerns to be Addressed: discharge planning       Patient plan of care discussed at interdisciplinary rounds: Yes    Anticipated Discharge Disposition: Long Term Care     Anticipated Discharge Services: Other (see comment) (nursing care services, assistance with ADLs, care and supervision)    Anticipated Discharge DME: None    Patient/family educated on Medicare website which has current facility and service quality ratings: yes    Education Provided on the Discharge Plan:  Yes    Patient/Family in Agreement with the Plan: yes    Referrals Placed by CM/ERIKA: Post Acute Facilities    Private pay costs discussed: Not applicable    Additional Information: ERIKA following up on LTC referrals.  ERIKA spoke with Trevin at [facility].  Per management, they can take pt back as long as pt's daughter Court is on board/in agreement with pt returning to facility.      ERIKA spoke with Rocio at [facility] in [place].  She stated that they could likely take pt.  She will finish reviewing updated notes and will call SW tomorrow morning regarding bed.    ERIKA spoke with Maya at [facility] - pt accepted for admission tomorrow.  Ride time after 1 PM preferred.  They will start pt in a private TCU room so that pt can get wounds addressed with better staff-pt ratio and then would move her to a LTC shared room later on.      ERIKA attempted to call pt's daughter Court several times but continuously received message that voice mailbox full and unable to leave a message.      ERIKA met with pt and reviewed all three LTC facility choices.  Pt prefers to go back to [facility] if at all possible to be with her daughter.  She is unsure about other two options.  She also has been trying to reach Court.  She asked that ERIKA contact her daughter Valerie to see if Valerie can get ahold of Court and have  Court call her.  ERIKA let pt know that she would do so.      ERIKA called pt's daughter Valerie and spoke with her about discharge planning.  SW updated her regarding unable to reach Court and pt preference to return to where daughter April is.  Discussed other two facilities that are interested in pt coming to them.  Valerie will try to reach Court and have her call pt.  Valerie will come to the hospital tomorrow at 10 AM to meet with SW and pt.  She will hopefully be able to reach Court as well and Court can be here for meeting.  She shared that she planned to have a family member from [facility] speak with Court this evening regarding situation.      Transportation TBD.  Needs PAS.        DAPHNEY Stevenson, LGSW 03/20/23 7:14 PM

## 2023-03-21 NOTE — PLAN OF CARE
Goal Outcome Evaluation:  Patient slept between cares over night.  Turned and repositioned every 2 hours.  Medicated with oxycodone twice over night.

## 2023-03-21 NOTE — PROGRESS NOTES
Hospitalist Progress Note        CODE STATUS:  Full Code    Assessment/Plan:    Cristal Preciado is a 72-year-old woman, bedbound nursing home resident with history of sacral decubitus ulcer (present on admission), CVA, dementia, neuropathy, DM2, CKD, CAD, failure to thrive admitted on 2/16/2023 with infected new sacral decubitus ulcer vs possible necrotizing fasciitis.  She underwent I&D (2/17) with necrotizing tissue down to the sacrum and  with osteomyelitis.       Hospital course has been complicated by anemia requiring blood transfusion and malnutrition due to poor oral intake; She had a trial of NG feeds with calorie count. She has been eating 100% of all three meals a day and so NGT/Tube feeds were discontinued Mar 15. She continues to maintain good PO intake. She is waiting for placement.     Severe sepsis secondary to infected stage IV sacral and right greater trochanter decubitus ulcers with sacral osteomyelitis  - Hemodynamically stable  - ID consulted   Got Zosyn x 4-6 weeks from source control (2/17/23) to 3/17 then started augmentin 875-125mg PO BID for 4 more weeks till 4/14  - ID signed off  - Nutritional support  - Wound care essential, plan as outlined below from Wound care consult    Wound care   Negative pressure wound therapy plan:  Wound location: Sacrum + R Trocanter    Change Days: Mon/Wed/Fri by WOC RN    Supplies (including all accessories) used: large Black foam , Adapt barrier ring and Cavilon no sting barrier film  Cleanse with Vashe prior to replacing VAC    Suction setting: -125   Methods used: Window paned all periwound skin with vac drape prior to applying sponge and barrier rings around entire sacral wound to protect from stool      Staff RN to assess integrity of dressing and ensure suction is set at appropriate level every shift.   Date canister. Chart canister output every shift. Change cannister weekly and PRN if full/occluded.     Remove foam dressing and replace with BID normal  "saline moist gauze dressing if:   -a dressing failure which cannot be repaired within 2 hours   -patient is discharging to home without a home pump   -patient is discharging to a facility outside the local area   -if a dressing is a \"Silver Foam\", remove before Radiation Therapy or MRI      The hospital VAC pump is not to be discharged with the patient.?Ensure to disconnect patient from machine prior to discharge. Then,    - If a home KCI VAC pump has been delivered, connect home cannister to dressing tubing then connect cannister to home pump and turn on machine    - If transferring to a nearby facility with a KCI vac, can disconnect and clamp tubing then cover end with glove so can be reconnected within 2 hours       Heels  1. Paint with betadine daily, allow to dry  2. Offload heels at all times while in bed, utilize prevlon boots     RECOMMEND PRIMARY TEAM ORDER: None, at this time  Education provided: importance of repositioning, wound progress, Infection prevention , Moisture management and Off-loading pressure  Discussed plan of care with: Patient and Nurse  WOC nurse follow-up plan: Monday/WednesdayFriday  Notify WOC if wound(s) deteriorate.  Nursing to notify the Provider(s) and re-consult the WOC Nurse if new skin concern.     Moderate malnutrition, weight loss, failure to thrive  - Review of prior notes regarding EGD, alternative feeds.  Per previous providers discussion with Ms. Ozzy Yun, patient has had a bad experience with G-tube in family and would like to start with NG.    - NG placed 3/9/23 and started TF.    - She has been completing 100% of all three meals daily. NJ tube and tube feeds discontinued Mar 15   - I have tried to call patient's daughter daily since Mar 15. Voicemail full.    - Monitor Mg, phos, K for refeeding.     Acute blood loss anemia: Hgb was 5.7 after surgery. Transfused 2 units of packed RBCs.  Hemoglobin has been stable.  - On 3/12/2023, hemoglobin dropped to 6.4 " for which she was transfused with 1 unit of PRBC.  - Hgb stable  - Monitor hemoglobin and transfuse as needed    Type II DM  Hyperglycemia  - HgbA1c on 1/3/2023 was 7.6 %  - Continue Lantus. Continue metformin, twice daily  - Adjustment doses as needed  - Continue Novolog sliding scale.   - PTA Victoza  hold.     HTN  - Cont Metoprolol     History of CAD  - Continue Metoprolol, lipitor, asa      Goal of care:   - Honoring choices filled out nominating daughter Court as surrogate if needed for decisions as of 3/7/23.  - Have not been able to speak to Court. She is not aware the NGT was removed. Her number rings 1-2x and then straight to a full mailbox.  - Waiting for placmeent      Hyponatremia, hypokalemia, hypomagnesemia  - Monitor electrolytes and replace as per protocol     Hypercalcemia   - Mild on admission and probably from immobility vs. dehydration.  Resolved     Pressure ulcer on bilateral heels, unstageable: Present on admission. L heel: 4  x 5.5 cm  and R heel: 2  x 2 cm, the skin surface is black.  - Wound care consulted. On offload boots.  Regular turning in bed     History of ulcer, GERD  - Cont PTA PPI     Hyperlipidemia  - Cont Lipitor      DVT Prophylaxis: Lovenox    Disposition/Barrier to discharge;  Placement    Subjective:  Interval History:   Patient seen and examined.   No events overnight.  She is AAOx3.no new symptom or question      Review of Systems:   As mentioned in subjective.    Patient Active Problem List   Diagnosis     Cataract     CAD (coronary artery disease)     Diabetic nephropathy (H)     Diabetic neuropathy (H)     Diabetic retinopathy (H)     GERD (gastroesophageal reflux disease)     Glaucoma     Hypertension     Hyperlipidemia     H/O: stroke     Anemia     Seasonal allergies     Depression     Tubular adenoma of colon     Leg weakness     Dermatitis, seborrheic     Eczematous dermatitis     History of coronary artery disease     Venous stasis dermatitis of both  lower extremities     Chronic dermatitis of hands     Neoplasm of uncertain behavior of skin     S/P hysterectomy     Hypomagnesemia     Gout     DM type 2 w Neuro/Retin/Nephr -- hgb A1C 7.6 on 1/3/23     Stage 3 chronic kidney disease, unspecified whether stage 3a or 3b CKD (H)     Acute chest pain     Chronic kidney disease, stage 2 (mild)     Acute metabolic encephalopathy     Oropharyngeal dysphagia     Sacral decubitus ulcer     Dehydration     Confusion     Adult failure to thrive     Acute kidney injury (H)     Cognitive impairment Consistent with Mild Dementia     Polyneuropathy associated with underlying disease (H)     Acute cystitis with hematuria     Delirium     Protein-calorie malnutrition (H)     Wound infection     Sacral wound, initial encounter     Severe sepsis (H)     Hypokalemia     Hypercalcemia     Anemia due to blood loss, acute     Pressure injury of contiguous region involving buttock and hip, stage 4 (H)     Necrotizing fasciitis (H)     Hypoglycemia     Pressure ulcer of both heels, unstageable (H)       Scheduled Meds:    amoxicillin-clavulanate  1 tablet Oral Q12H [facility] (08/20)     aspirin  81 mg Oral or NG Tube Daily     atorvastatin  80 mg Oral or NG Tube QPM     diclofenac  2 g Topical 4x Daily     dorzolamide-timolol  1 drop Both Eyes BID     DULoxetine  90 mg Oral or NG Tube QAM     enoxaparin ANTICOAGULANT  30 mg Subcutaneous Q24H     insulin aspart  1-10 Units Subcutaneous TID AC     insulin aspart  1-7 Units Subcutaneous At Bedtime     insulin glargine  13 Units Subcutaneous At Bedtime     insulin glargine  15 Units Subcutaneous QAM AC     Terrence  1 packet Oral BID w/meals     latanoprost  1 drop Both Eyes At Bedtime     metFORMIN  750 mg Oral BID w/meals     metoprolol tartrate  50 mg Oral or NG Tube BID     mirtazapine  15 mg Oral or NG Tube At Bedtime     multivitamin w/minerals  1 tablet Oral or Feeding Tube Daily     olopatadine  1 drop Both Eyes Daily     pantoprazole  40 mg  Oral QAM AC     polyvinyl alcohol  1 drop Both Eyes TID     sodium chloride (PF)  10-30 mL Intracatheter Q8H     traZODone  50 mg Oral or NG Tube At Bedtime     Continuous Infusions:    - MEDICATION INSTRUCTIONS -       PRN Meds:.sodium chloride 0.9%, acetaminophen, glucose **OR** dextrose **OR** glucagon, diphenhydrAMINE **OR** diphenhydrAMINE, lidocaine 4%, lidocaine, lidocaine (buffered or not buffered), melatonin, methocarbamol, naloxone **OR** naloxone **OR** naloxone **OR** naloxone, nitroGLYcerin, oxyCODONE, - MEDICATION INSTRUCTIONS -, senna-docusate, simethicone, sodium chloride (PF)    Objective:  Vital signs in last 24 hours:  Temp:  [97.9  F (36.6  C)-98.5  F (36.9  C)] 98.1  F (36.7  C)  Pulse:  [72-82] 72  Resp:  [18-20] 20  BP: (136-147)/(52-67) 139/65  SpO2:  [94 %-98 %] 98 %      Physical Exam:  General: In NAD  HEENT: NCAT & EOMI.  CV: Normal S1S2, regular rhythm, normal rate  Lungs: CTAB  Abdomen: Soft, NT, ND, +BS  Extremities: No LE edema b/l  Neuro: AAOx3    Lab Results:    Recent Results (from the past 24 hour(s))   Glucose by meter    Collection Time: 03/20/23 11:47 AM   Result Value Ref Range    GLUCOSE BY METER POCT 195 (H) 70 - 99 mg/dL   Glucose by meter    Collection Time: 03/20/23 12:48 PM   Result Value Ref Range    GLUCOSE BY METER POCT 168 (H) 70 - 99 mg/dL   Glucose by meter    Collection Time: 03/20/23  5:51 PM   Result Value Ref Range    GLUCOSE BY METER POCT 124 (H) 70 - 99 mg/dL   Glucose by meter    Collection Time: 03/20/23  9:37 PM   Result Value Ref Range    GLUCOSE BY METER POCT 143 (H) 70 - 99 mg/dL   Hemoglobin and hematocrit    Collection Time: 03/21/23  5:27 AM   Result Value Ref Range    Hemoglobin 8.5 (L) 11.7 - 15.7 g/dL    Hematocrit 27.4 (L) 35.0 - 47.0 %   Phosphorus    Collection Time: 03/21/23  5:27 AM   Result Value Ref Range    Phosphorus 3.3 2.5 - 4.5 mg/dL       Serum Glucose range:   Recent Labs   Lab 03/20/23  2137 03/20/23  1751 03/20/23  1248  03/20/23  1147   * 124* 168* 195*     ABG: No lab results found in last 7 days.  CBC:   Recent Labs   Lab 03/21/23  0527 03/20/23  0521 03/19/23  0819   HGB 8.5* 7.9* 8.1*   HCT 27.4* 26.3* 26.9*     Chemistry:   Recent Labs   Lab 03/20/23  0521 03/19/23  0819 03/18/23  0500   POTASSIUM 3.8 3.9 4.0   CR  --  0.87  --    GFRESTIMATED  --  71  --    MAG 1.5* 1.6* 1.7     Coags:  No results for input(s): INR, PROTIME, PTT in the last 168 hours.    Invalid input(s): APTT  Cardiac Markers:  No results for input(s): CKTOTAL, TROPONINI in the last 168 hours.               03/21/2023   Manuel Bennett MD

## 2023-03-21 NOTE — PROGRESS NOTES
Care Management Discharge Note    Discharge Date: 03/21/2023      Discharge Disposition: Transitional Care, Long Term Care    Discharge Services: Other (see comment) (nursing care services, assistance with ADLs, care and supervision)    Discharge DME: Wound Vac    Discharge Transportation: other (see comments) ([facility])    Private pay costs discussed: transportation costs    PAS Confirmation Code:  ([id number])     Patient/family educated on Medicare website which has current facility and service quality ratings: yes    Education Provided on the Discharge Plan:  Yes    Persons Notified of Discharge Plans: pt, pt's daughter Valerie     Patient/Family in Agreement with the Plan: yes    Handoff Referral Completed: yes    Additional Information: ERIKA met with pt and pt's daughter Valerie.  SW, Valerie, and a relative down [facility] named Claudia have all tried to reach [place], to no avail.  Discussed doing a new HCD with pt today appointing Valerie as her health care agent.  Pt in agreement.  SW filled out HCD with pt and Valerie.  Pt signed document and document notarized.  New HCD sent to [facility].  Per Zeinab Navarrete, this new HCD will supersede previous one done on 3/8/23.  Copies provided to pt and Valerie and some placed in pt's hard chart.  Per Trevin at [facility], they need copy of new HCD (ERIKA faxed to her).  Per Trevin, upper management needs to review this and decide if they can take pt back.  She stated that it would not be today.  She could not guarantee tomorrow either.    ERIKA discussed with pt and Valerie that pt is medically ready to discharge and we have an accepting facility - [facility].  Pt will start in a private room on TCU side and then will transition to shared LTC room when ready.  Pt needs to discharge today.  Pt and Valerie in agreement with plan.  Discussed transportation options and costs - pt and Valerie agreed to [facility] ride.  Per daria and Valerie, pt able to sit up in a wheelchair.  ERIKA  set up ride for pt at 3:30 PM today via [facility].  ERIKA updated pt's nurse, charge, HUC, doctor, and Maya at facility.      ERIKA provided Valerie with referral for mobile notary, as she plans to have pt do a Power of  document at some point.  Valerie reported that she had finally heard from Court via e-mail and has been conversing with her this way today.  Court is upset.  Valerie shared that she plans to try to take this to mediation to try to iron out their issues.  Valerie shared that she and pt spoke with [facility] and feel better about pt going there.  It sounds like a nice place, and they may even consider moving April (pt's other daughter still at [facility]) there if pt likes it there.      ERIKA spoke with Maya from facility several times today regarding orders and wound information so they could order wound vac for pt.  Ride time confirmed.  PAS done.          DAPHNEY Stevenson, DUKESW 03/21/23 7:02 PM

## 2023-03-21 NOTE — PLAN OF CARE
Problem: Oral Intake Inadequate  Goal: Improved Oral Intake  3/20/2023 1939 by Cece Mari RN  Outcome: Progressing  Patient consumed 25% of breakfast, 50% of lunch.  Refused Terrence.    Problem: Plan of Care - These are the overarching goals to be used throughout the patient stay.    Goal: Absence of Hospital-Acquired Illness or Injury  Intervention: Prevent Skin Injury  Recent Flowsheet Documentation  Taken 3/20/2023 1900 by Cece Mari RN  Body Position:   turned   left  Taken 3/20/2023 1635 by Cece Mari RN  Body Position:   turned   right  Taken 3/20/2023 1346 by Cece Mari RN  Body Position:   turned   left  Taken 3/20/2023 1030 by Cece Mari RN  Body Position:   turned   right  Taken 3/20/2023 0835 by Cece Mari RN  Body Position:   turned   left  Repositioned in bed q2h, low air loss mattress in use.  Wound vac dressing in place.    Cece Mari RN

## 2023-03-21 NOTE — PLAN OF CARE
Problem: Mobility Impairment  Goal: Optimal Mobility  Outcome: Progressing   Goal Outcome Evaluation:         Continue to reposition every 2 hours.   Problem: Oral Intake Inadequate  Goal: Improved Oral Intake  Outcome: Progressing         Pt being fed and is eating well.

## 2023-03-21 NOTE — DISCHARGE SUMMARY
[facility]  Hospitalist Discharge Summary      Date of Admission:  2/16/2023  Date of Discharge:  3/21/2023  Discharging Provider: Manuel Bennett MD  Discharge Service: Hospitalist Service    Discharge Diagnoses   Severe sepsis secondary to infected stage IV sacral and right greater trochanter decubitus ulcers with sacral osteomyelitis  Acute blood loss anemia  Type II DM  HTN  Hx of CAD  Hyponatremia, hypokalemia, hypomagnesemia  Hypercalcemia   Pressure ulcer on bilateral heels, unstageable:   History of ulcer, GERD  Hyperlipidemia    Follow-ups Needed After Discharge   Follow-up Appointments     Follow Up and recommended labs and tests      Follow up with Nursing home physician.   Follow up with wound care team. See recommendation in discharge summary             Unresulted Labs Ordered in the Past 30 Days of this Admission     No orders found from 1/17/2023 to 2/17/2023.      These results will be followed up by NA    Discharge Disposition   Discharged to short-term care facility  Condition at discharge: Good      Hospital Course     Cristal Preciado is a 72-year-old woman, bedbound nursing home resident with history of sacral decubitus ulcer (present on admission), CVA, dementia, neuropathy, DM2, CKD, CAD, failure to thrive admitted on 2/16/2023 with infected new sacral decubitus ulcer vs possible necrotizing fasciitis.  She underwent I&D (2/17) with necrotizing tissue down to the sacrum and  with osteomyelitis.       Hospital course has been complicated by anemia requiring blood transfusion and malnutrition due to poor oral intake; She had a trial of NG feeds with calorie count. She has been eating 100% of all three meals a day and so NGT/Tube feeds were discontinued Mar 15. She continues to maintain good PO intake. She is waiting for placement.     Severe sepsis secondary to infected stage IV sacral and right greater trochanter decubitus ulcers with sacral osteomyelitis  -  "Hemodynamically stable  - ID consulted   Got Zosyn x 4-6 weeks from source control (2/17/23) to 3/17 then started augmentin 875-125mg PO BID for 4 more weeks till 4/14  - ID signed off  - Nutritional support  - Wound care essential, plan as outlined below from Wound care consult     Wound care   Negative pressure wound therapy plan:  Wound location: Sacrum + R Trocanter    Change Days: Mon/Wed/Fri by WOC RN    Supplies (including all accessories) used: large Black foam , Adapt barrier ring and Cavilon no sting barrier film  Cleanse with Vashe prior to replacing VAC    Suction setting: -125   Methods used: Window paned all periwound skin with vac drape prior to applying sponge and barrier rings around entire sacral wound to protect from stool      Staff RN to assess integrity of dressing and ensure suction is set at appropriate level every shift.   Date canister. Chart canister output every shift. Change cannister weekly and PRN if full/occluded.     Remove foam dressing and replace with BID normal saline moist gauze dressing if:   -a dressing failure which cannot be repaired within 2 hours   -patient is discharging to home without a home pump   -patient is discharging to a facility outside the local area   -if a dressing is a \"Silver Foam\", remove before Radiation Therapy or MRI      The hospital VAC pump is not to be discharged with the patient.?Ensure to disconnect patient from machine prior to discharge. Then,    - If a home KCI VAC pump has been delivered, connect home cannister to dressing tubing then connect cannister to home pump and turn on machine    - If transferring to a nearby facility with a KCI vac, can disconnect and clamp tubing then cover end with glove so can be reconnected within 2 hours       Heels  1. Paint with betadine daily, allow to dry  2. Offload heels at all times while in bed, utilize prevlon boots     RECOMMEND PRIMARY TEAM ORDER: None, at this time  Education provided: importance of " repositioning, wound progress, Infection prevention , Moisture management and Off-loading pressure  Discussed plan of care with: Patient and Nurse  WO nurse follow-up plan: Monday/WednesdayFriday  Notify WOC if wound(s) deteriorate.  Nursing to notify the Provider(s) and re-consult the WOC Nurse if new skin concern.     Moderate malnutrition, weight loss, failure to thrive  - Review of prior notes regarding EGD, alternative feeds.  Per previous providers discussion with Court, Ms. Preciado, patient has had a bad experience with G-tube in family and would like to start with NG.    - NG placed 3/9/23 and started TF.    - She has been completing 100% of all three meals daily. NJ tube and tube feeds discontinued Mar 15   - I have tried to call patient's daughter daily since Mar 15. Voicemail full.    - Monitor Mg, phos, K for refeeding.     Acute blood loss anemia: Hgb was 5.7 after surgery. Transfused 2 units of packed RBCs.  Hemoglobin has been stable.  - On 3/12/2023, hemoglobin dropped to 6.4 for which she was transfused with 1 unit of PRBC.  - Hgb stable  - Monitor hemoglobin and transfuse as needed     Type II DM  Hyperglycemia  - HgbA1c on 1/3/2023 was 7.6 %  - Continue Lantus. Continue metformin, twice daily  - Adjustment doses as needed  - Continue Novolog sliding scale.   - PTA Victoza  hold.     HTN  - Cont Metoprolol     History of CAD  - Continue Metoprolol, lipitor, asa      Goal of care:   - Honoring choices filled out nominating daughter Court as surrogate if needed for decisions as of 3/7/23.  - Have not been able to speak to Court. She is not aware the NGT was removed. Her number rings 1-2x and then straight to a full mailbox.  - Waiting for placmeent      Hyponatremia, hypokalemia, hypomagnesemia  - Monitor electrolytes and replace as per protocol     Hypercalcemia   - Mild on admission and probably from immobility vs. dehydration.  Resolved     Pressure ulcer on bilateral heels,  unstageable: Present on admission. L heel: 4  x 5.5 cm  and R heel: 2  x 2 cm, the skin surface is black.  - Wound care consulted. On offload boots.  Regular turning in bed     History of ulcer, GERD  - Cont PTA PPI     Hyperlipidemia  - Cont Lipitor        DVT Prophylaxis: Lovenox  Consultations This Hospital Stay   PHARMACY TO DOSE VANCO  PHARMACY TO DOSE VANCO  CARE MANAGEMENT / SOCIAL WORK IP CONSULT  SURGERY GENERAL IP CONSULT  VASCULAR ACCESS ADULT IP CONSULT  SPEECH LANGUAGE PATH ADULT IP CONSULT  WOUND OSTOMY CONTINENCE NURSE  IP CONSULT  INFECTIOUS DISEASES IP CONSULT  CARE MANAGEMENT / SOCIAL WORK IP CONSULT  WOUND OSTOMY CONTINENCE NURSE  IP CONSULT  WOUND OSTOMY CONTINENCE NURSE  IP CONSULT  NUTRITION SERVICES ADULT IP CONSULT  PALLIATIVE CARE ADULT IP CONSULT  CARE MANAGEMENT / SOCIAL WORK IP CONSULT  GASTROENTEROLOGY IP CONSULT  INTEGRATIVE THERAPIES CONSULT  SOCIAL WORK IP CONSULT  ETHICS IP CONSULT  NUTRITION SERVICES ADULT IP CONSULT  PHARMACY IP CONSULT  PHYSICAL THERAPY ADULT IP CONSULT  OCCUPATIONAL THERAPY ADULT IP CONSULT    Code Status   Full Code    Time Spent on this Encounter   I, Manuel Bennett MD, personally saw the patient today and spent greater than 30 minutes discharging this patient.       Manuel Bennett MD  [place] [id number]  Phone: [phone]  Fax: [phone]  ______________________________________________________________________    Physical Exam   Vital Signs: Temp: 98.1  F (36.7  C) Temp src: Oral BP: 139/65 Pulse: 72   Resp: 20 SpO2: 98 % O2 Device: None (Room air)    Weight: 129 lbs 10.09 oz  Physical Exam:  General: In NAD  HEENT: NCAT & EOMI.  CV: Normal S1S2, regular rhythm, normal rate  Lungs: CTAB  Abdomen: Soft, NT, ND, +BS  Extremities: No LE edema b/l  Neuro: AAOx3    See wound care note at the end of this report for detail of skin condition       Primary Care Physician   Aditi  India Allen    Discharge Orders      Wound Care Referral      General info for SNF    Length of Stay Estimate: Short Term Care: Estimated # of Days <30  Condition at Discharge: Improving  Level of care:skilled   Rehabilitation Potential: Good  Admission H&P remains valid and up-to-date: Yes  Recent Chemotherapy: N/A  Use Nursing Home Standing Orders: Yes     Mantoux instructions    Give two-step Mantoux (PPD) Per Facility Policy Yes     Follow Up and recommended labs and tests    Follow up with Nursing home physician.   Follow up with wound care team. See recommendation in discharge summary     Reason for your hospital stay    Infected wound with severe infection     Activity - Up with nursing assistance     Physical Therapy Adult Consult    Evaluate and treat as clinically indicated.    Reason:  deconditioning and weakness     Occupational Therapy Adult Consult    Evaluate and treat as clinically indicated.    Reason:  decondionning     Fall precautions     Diet    Follow this diet upon discharge: Orders Placed This Encounter      Calorie Counts      Calorie Counts      Snacks/Supplements Adult: Magic Cup; With Meals      Snacks/Supplements Adult: Terrence; With Meals      Calorie Counts      Snacks/Supplements Adult: Gelatein Plus; With Meals      Regular Diet Adult       Significant Results and Procedures   Most Recent 3 CBC's:Recent Labs   Lab Test 03/21/23  0527 03/20/23  0521 03/19/23  0819 03/13/23  1158 03/12/23  0712 03/10/23  1215 03/10/23  0651 03/06/23  0452 03/03/23  0645 03/02/23  0517   WBC  --   --   --   --   --   --  4.5  --  5.0 5.1   HGB 8.5* 7.9* 8.1*   < > 6.4*   < > 7.0*  --  8.4* 8.0*   MCV  --   --   --   --   --   --  96  --  94 93   PLT  --   --   --   --  250  --  159 267 248 242    < > = values in this interval not displayed.     Most Recent 3 BMP's:Recent Labs   Lab Test 03/21/23  1223 03/21/23  0842 03/20/23  2137 03/20/23  0755 03/20/23  0521 03/19/23  0836 03/19/23  0819 03/18/23  0720  03/18/23  0500 03/12/23  0829 03/12/23  0712 03/11/23  0805 03/11/23  0513 03/10/23  0809 03/10/23  0808   NA  --   --   --   --   --   --   --   --   --   --  140  --  137  --  135*   POTASSIUM  --   --   --   --  3.8  --  3.9  --  4.0   < > 4.1  --  4.4  --  4.3   CHLORIDE  --   --   --   --   --   --   --   --   --   --  105  --  107  --  109*   CO2  --   --   --   --   --   --   --   --   --   --  26  --  25  --  19*   BUN  --   --   --   --   --   --   --   --   --   --  13.3  --  13.4  --  7.4*   CR  --   --   --   --   --   --  0.87  --   --   --  0.68  --  0.57  --  0.62   ANIONGAP  --   --   --   --   --   --   --   --   --   --  9  --  5*  --  7   OLAF  --   --   --   --   --   --   --   --   --   --  7.3*  --  7.6*  --  7.3*   * 112* 143*   < >  --    < >  --    < >  --    < > 314*   < > 401*   < > 292*    < > = values in this interval not displayed.     Most Recent 2 LFT's:Recent Labs   Lab Test 03/13/23  0620 03/03/23  0645   AST 60* 40*   ALT 32 14   ALKPHOS 248* 160*   BILITOTAL 0.2 0.3     Most Recent 3 INR's:No lab results found.  Most Recent INR's and Anticoagulation Dosing History:  Anticoagulation Dose History     Recent Dosing and Labs Latest Ref Rng & Units 8/12/2005 6/28/2007 9/14/2010 3/28/2013    INR 0.86 - 1.14 1.00 1.02 0.95 1.12        Most Recent 3 Creatinines:Recent Labs   Lab Test 03/19/23  0819 03/12/23  0712 03/11/23  0513   CR 0.87 0.68 0.57     Most Recent 3 Hemoglobins:Recent Labs   Lab Test 03/21/23  0527 03/20/23  0521 03/19/23  0819   HGB 8.5* 7.9* 8.1*     Most Recent 3 Troponin's:No lab results found.  Most Recent 3 BNP's:Recent Labs   Lab Test 07/23/19  1158   NTBNP 54     Most Recent D-dimer:No lab results found.  Most Recent Cholesterol Panel:Recent Labs   Lab Test 11/18/21  0544   CHOL 92   LDL 30   HDL 26*   TRIG 180*     7-Day Micro Results     No results found for the last 168 hours.        Most Recent TSH and T4:Recent Labs   Lab Test 02/03/23  0602   TSH 0.82      Most Recent Hemoglobin A1c:Recent Labs   Lab Test 01/03/23  2250   A1C 7.6*     Most Recent 6 glucoses:Recent Labs   Lab Test 03/21/23  1223 03/21/23  0842 03/20/23  2137 03/20/23  1751 03/20/23  1248 03/20/23  1147   * 112* 143* 124* 168* 195*     Most Recent Urinalysis:Recent Labs   Lab Test 02/16/23  1924 01/03/23  2315 09/11/19  1244   COLOR Yellow   < > Straw   APPEARANCE Cloudy*   < > Cloudy*   URINEGLC 500*   < > Negative   URINEBILI Negative   < > Negative   URINEKETONE Negative   < > Negative   SG 1.029   < > 1.007   UBLD 0.1 mg/dL*   < > Negative   URINEPH 5.0   < > 5.0   PROTEIN 20*   < > Negative   UROBILINOGEN  --   --  <2.0 E.U./dL   NITRITE Negative   < > Negative   LEUKEST 500 Summer/uL*   < > Small*   RBCU 29*   < > 0-2   WBCU 31*   < > 0-5    < > = values in this interval not displayed.     Most Recent ABG:Recent Labs   Lab Test 02/17/23  0205   PH 7.42     Most Recent ESR & CRP:Recent Labs   Lab Test 03/13/23  0620 02/23/23  0722 02/16/23  1431   SED  --   --  118*   CRPI 60.40*   < > 337.70*    < > = values in this interval not displayed.     Most Recent Anemia Panel:Recent Labs   Lab Test 03/21/23  0527 03/13/23  1158 03/12/23  0712 03/10/23  1215 03/10/23  0651 02/01/23  1254 01/06/23  1215 01/05/23  0900 01/04/23  0324 01/03/23  2250 10/28/21  0610 02/22/21  0038   WBC  --   --   --   --  4.5   < > 5.2  --  6.2 6.7   < > 4.2   HGB 8.5*   < > 6.4*   < > 7.0*   < > 10.1*  --  9.9* 10.1*   < > 10.5*   HCT 27.4*   < >  --   --  24.2*   < > 31.9*  --  31.5* 31.9*   < > 33.1*   MCV  --   --   --   --  96   < > 98  --  99 99   < > 97   PLT  --   --  250  --  159   < > 211  --  214 230   < > 192   IRON  --   --   --   --   --   --  38   < >  --   --   --   --    IRONSAT  --   --   --   --   --   --  21   < >  --   --   --   --    RETICABSCT  --   --   --   --   --   --  0.076  --   --   --   --   --    RETP  --   --   --   --   --   --  2.3  --   --   --   --   --    FEB  --   --   --   --    --   --  178*   < >  --   --   --   --    CLAYTON  --   --   --   --   --   --   --   --   --   --   --  449*   B12  --   --   --   --   --   --   --   --   --  792  --   --    FOLIC  --   --   --   --   --   --   --   --  >40.0*  --   --   --     < > = values in this interval not displayed.     Most Recent CPK:No lab results found.,   Results for orders placed or performed during the hospital encounter of 02/16/23   CT Pelvis Soft Tissue w Contrast     Value    Radiologist flags Necrotizing fasciitis (AA)    Narrative    EXAM: CT PELVIS SOFT TISSUE W CONTRAST  LOCATION: [facility]  DATE/TIME: 2/16/2023 6:31 PM    INDICATION: Malodorous wounds to left buttock and right hip, severe sepsis on lab testing today  COMPARISON: 1/3/2023  TECHNIQUE: CT scan of the pelvis was performed with IV contrast. Multiplanar reformats were obtained. Dose reduction techniques were used.  CONTRAST: 100ml isovue 370    FINDINGS: Mild motion artifact.    PELVIC ORGANS: Increased circumferential bladder wall thickening measuring up to 0.8 cm is consistent with cystitis. Hysterectomy. Atherosclerotic vascular disease.    MUSCULOSKELETAL: A new sacral decubitus ulcer with subcutaneous emphysema extending into the surrounding soft tissues with associated inflammatory changes. A new decubitus ulcer at the right greater trochanter with surrounding inflammatory changes. No   loculated drainable fluid collection. No aggressive osseous erosion. Degenerative changes of the spine and hips.      Impression    IMPRESSION:  1.  New sacral decubitus ulcer with subcutaneous emphysema extending into the surrounding soft tissues. This could represent necrotizing fasciitis.  2.  A new right greater trochanter decubitus ulcer.  3.  Cystitis.      [Critical Result: Necrotizing fasciitis]    Finding was identified on 2/16/2023 6:35 PM.     Dr. Singer was contacted by me on 2/16/2023 6:42 PM and verbalized understanding of the critical  result.      XR Feeding Tube Placement    Narrative    EXAM: XR FEEDING TUBE PLACEMENT  LOCATION: [facility]  DATE/TIME: 3/8/2023 11:49 AM    INDICATION: Feeding tube placement, needs parenteral nutrition  COMPARISON: None.    TECHNIQUE: Routine.    FINDINGS: Enteric tube placed without complication. Tip in the distal gastric antrum. Despite multiple attempts, the tube could not be advanced into the duodenum.    FLUOROSCOPIC TIME: 5.3 minutes  NUMBER OF IMAGES: 1    Reference CPT Code: 58387     XR Abdomen Port 1 View    Narrative    EXAM: XR ABDOMEN PORT 1 VIEW  LOCATION: [facility]  DATE/TIME: 3/14/2023 6:58 PM    INDICATION: NG placement  COMPARISON: Fluoroscopic tube placement 03/08/2023      Impression    IMPRESSION: Enteric feeding tube tip unchanged in the distal stomach. Nonobstructive bowel gas pattern.     *Note: Due to a large number of results and/or encounters for the requested time period, some results have not been displayed. A complete set of results can be found in Results Review.       Discharge Medications   Current Discharge Medication List      START taking these medications    Details   acetaminophen (TYLENOL) 325 MG/10.15ML solution 20.3 mLs (650 mg) by Oral or G tube route every 6 hours as needed for mild pain or other (and adjunct with moderate or severe pain or per patient request)    Associated Diagnoses: Pain      amoxicillin-clavulanate (AUGMENTIN) 875-125 MG tablet Take 1 tablet by mouth every 12 hours    Associated Diagnoses: Wound infection; Severe sepsis (H)      diphenhydrAMINE (BENADRYL) 25 MG capsule Take 1 capsule (25 mg) by mouth every 6 hours as needed for itching    Associated Diagnoses: Itching      enoxaparin ANTICOAGULANT (LOVENOX) 30 MG/0.3ML syringe Inject 0.3 mLs (30 mg) Subcutaneous every 24 hours    Associated Diagnoses: DVT prophylaxis      !! insulin aspart (NOVOLOG PEN) 100 UNIT/ML pen Inject 1-7 Units Subcutaneous  At Bedtime  Qty: 15 mL    Associated Diagnoses: Diabetic peripheral neuropathy associated with type 2 diabetes mellitus (H)      !! insulin aspart (NOVOLOG PEN) 100 UNIT/ML pen Inject 1-10 Units Subcutaneous 3 times daily (before meals)  Qty: 15 mL    Associated Diagnoses: Diabetic peripheral neuropathy associated with type 2 diabetes mellitus (H)      !! melatonin 1 MG TABS tablet Take 1 tablet (1 mg) by mouth nightly as needed for sleep    Associated Diagnoses: Other insomnia      methocarbamol (ROBAXIN) 500 MG tablet Take 1 tablet (500 mg) by mouth 4 times daily as needed for muscle spasms    Associated Diagnoses: Flatulence, eructation, and gas pain      mirtazapine (REMERON) 15 MG tablet 1 tablet (15 mg) by Oral or NG Tube route At Bedtime    Associated Diagnoses: Other depression      multivitamin w/minerals (THERA-VIT-M) tablet 1 tablet by Oral or Feeding Tube route daily    Associated Diagnoses: Sacral wound, initial encounter      Nutritional Supplements (KIMMIE) PACK Take 1 packet by mouth 2 times daily (with meals)    Associated Diagnoses: Sacral wound, initial encounter      oxyCODONE (ROXICODONE) 5 MG tablet Take 0.5 tablets (2.5 mg) by mouth every 4 hours as needed for moderate pain (4-6) (if pain not managed with non-pharmacological and non-opioid interventions)  Qty: 15 tablet, Refills: 0    Associated Diagnoses: Pain      pantoprazole (PROTONIX) 40 MG EC tablet Take 1 tablet (40 mg) by mouth every morning (before breakfast)    Associated Diagnoses: Gastroesophageal reflux disease with esophagitis without hemorrhage       !! - Potential duplicate medications found. Please discuss with provider.      CONTINUE these medications which have CHANGED    Details   aspirin (ASPIRIN LOW DOSE) 81 MG chewable tablet 1 tablet (81 mg) by Oral or NG Tube route daily    Associated Diagnoses: Coronary artery disease involving native coronary artery of native heart without angina pectoris      atorvastatin (LIPITOR)  80 MG tablet 1 tablet (80 mg) by Oral or NG Tube route every evening    Associated Diagnoses: Coronary artery disease involving native coronary artery of native heart without angina pectoris      diclofenac (VOLTAREN) 1 % topical gel Apply 2 g topically 4 times daily    Associated Diagnoses: Pain      DULoxetine (CYMBALTA) 30 MG capsule 3 capsules (90 mg) by Oral or NG Tube route every morning    Associated Diagnoses: Other depression      !! insulin glargine (LANTUS PEN) 100 UNIT/ML pen Inject 13 Units Subcutaneous At Bedtime  Qty: 15 mL    Comments: If Lantus is not covered by insurance, may substitute Basaglar or Semglee or other insulin glargine product per insurance preference at same dose and frequency.    Associated Diagnoses: Diabetic peripheral neuropathy associated with type 2 diabetes mellitus (H)      !! insulin glargine (LANTUS PEN) 100 UNIT/ML pen Inject 15 Units Subcutaneous every morning (before breakfast)  Qty: 15 mL    Comments: If Lantus is not covered by insurance, may substitute Basaglar or Semglee or other insulin glargine product per insurance preference at same dose and frequency.    Associated Diagnoses: Diabetic peripheral neuropathy associated with type 2 diabetes mellitus (H)      metFORMIN (GLUCOPHAGE-XR) 750 MG 24 hr tablet Take 1 tablet (750 mg) by mouth 2 times daily (with meals)    Associated Diagnoses: Diabetic peripheral neuropathy associated with type 2 diabetes mellitus (H)      metoprolol tartrate (LOPRESSOR) 50 MG tablet 1 tablet (50 mg) by Oral or NG Tube route 2 times daily    Associated Diagnoses: Diabetic peripheral neuropathy associated with type 2 diabetes mellitus (H)      olopatadine (PATANOL) 0.1 % ophthalmic solution daily    Associated Diagnoses: Cataract of both eyes, unspecified cataract type      simethicone (MYLICON) 80 MG chewable tablet 1 tablet (80 mg) by Oral or NG Tube route 4 times daily as needed    Associated Diagnoses: Flatulence, eructation, and gas  pain      traZODone (DESYREL) 50 MG tablet 1 tablet (50 mg) by Oral or NG Tube route At Bedtime    Associated Diagnoses: Sacral wound, initial encounter; Other insomnia       !! - Potential duplicate medications found. Please discuss with provider.      CONTINUE these medications which have NOT CHANGED    Details   dorzolamide-timolol (COSOPT) 2-0.5 % ophthalmic solution Place 1 drop into both eyes 2 times daily  Qty: 10 mL, Refills: 3    Associated Diagnoses: Primary open angle glaucoma (POAG) of both eyes, severe stage      latanoprost (XALATAN) 0.005 % ophthalmic solution Place 1 drop into both eyes At Bedtime  Qty: 5 mL, Refills: 2    Associated Diagnoses: Primary open angle glaucoma (POAG) of both eyes, severe stage      !! melatonin 3 MG tablet Take 3 mg by mouth At Bedtime      nitroGLYCERIN (NITROSTAT) 0.4 MG SL tablet Place 0.4 mg under the tongue every 5 minutes as needed.      polyvinyl alcohol (LIQUIFILM TEARS) 1.4 % ophthalmic solution Place 1 drop into both eyes 3 times daily      senna-docusate (SENOKOT-S/PERICOLACE) 8.6-50 MG tablet Take 2 tablets by mouth 2 times daily as needed for constipation  Qty: 60 tablet, Refills: 0    Associated Diagnoses: S/P hysterectomy       !! - Potential duplicate medications found. Please discuss with provider.      STOP taking these medications       acetaminophen (TYLENOL) 325 MG tablet Comments:   Reason for Stopping:         bisacodyl (DULCOLAX) 10 MG suppository Comments:   Reason for Stopping:         insulin lispro (HUMALOG KWIKPEN) 100 UNIT/ML (1 unit dial) KWIKPEN Comments:   Reason for Stopping:         ketoconazole (NIZORAL) 2 % shampoo Comments:   Reason for Stopping:         liraglutide (VICTOZA PEN) 18 MG/3ML solution Comments:   Reason for Stopping:         loperamide (IMODIUM A-D) 2 MG tablet Comments:   Reason for Stopping:         loratadine (CLARITIN) 10 MG tablet Comments:   Reason for Stopping:         olopatadine (PATADAY) 0.2 % ophthalmic  solution Comments:   Reason for Stopping:         omeprazole (PRILOSEC) 20 MG DR capsule Comments:   Reason for Stopping:         White Petrolatum-Mineral Oil (REFRESH P.M.) OINT Comments:   Reason for Stopping:             Allergies   Allergies   Allergen Reactions     Dust Mites Other (See Comments)     Sneezing runny eyes and nose.     Other Environmental Allergy Hives     Mountain Dew     Pollen Extract Other (See Comments)     Sneezing runny eyes and nose.     Walnuts [Nuts] Ailyn Dale RN  Registered Nurse  [facility] Nurse  Progress Notes  Signed  Date of Service:  3/20/2023 12:20 PM  Creation Time:  3/20/2023 12:20 PM                                                                                       [facility] Nurse Inpatient Assessment 3/20/23     Consulted for: Buttocks, heels     Patient History (according to provider note(s):       Cristal Preciado is a 72 year old female with PMH significant for known sacral decubitus ulcer (present on admission), CVA, dementia, neuropathy, DM2, CKD, CAD and FTT admitted on 2/16/2023 with infected new sacral decubitus ulcer with possible necrotizing fasciitis.      1. Sepsis- Admit patient. Infected Sacral Decubitus ulcer/Possible Necrotizing fasciits,UTI,Lactic acid= 6.4, WBC= 14.1. CT pelvis report reviewed. ED physician has already been in contact with General Surgeon on call and patient will be seen in consultation. Continue Vancomycin, Zosyn and Clindamycin. Continue IV fluids. Currently NPO. Repeat labs in a.m. Monitor vitals closely. Will make further recommendations based on clinical course.     2. Possible Necrotizing Fasciitis- ED has contacted General Surgeon on call and patient will be seen in consultation. Continue Vancomycin, Zosyn and Clindamycin. Plan per surgeon.     Areas Assessed:       Pressure Injury Location: Sacrum  (additional pictures available in media tab)                                                             3/10                                                                    3/13                                                                    3/20        Wound type: Pressure Injury     Pressure Injury Stage: 4, present on admission   Wound history/plan of care:   Patient admitted with worsening pressure wounds, was taken for an I&D  Wound base: 30 % slough and bone, 70 % dermis, granulation tissue, non-granular tissue and adipose tissue, more granulation today, likely assisted by NG/TF for nutrition.      Palpation of the wound bed: boggy      Drainage: small     Description of drainage: serosanguinous     Measurements (length x width x depth, in cm) 9  x 12  x  3 cm      Tunneling N/A     Undermining up to 1.5 cm from 8-11 o'clock  Periwound skin: Intact      Color: normal and consistent with surrounding tissue, slight purple discoloration near gluteal cleft      Temperature: normal   Odor: none  Pain: mild, with palpation and during dressing change  Pain intervention prior to dressing change: slow and gentle cares , PO pain medication given by RN  Treatment goal: Heal , Increase granulation and Protection  STATUS: evolving  Supplies ordered: gathered     My PI Risk Assessment     Sensory Perception: 2 - Very Limited     Moisture: 1 - Constantly moist     Activity: 1 - Bedfast      Mobility: 2 - Very limited     Nutrition: 2 - Probably inadequate      Friction/Shear: 1 - Problem     TOTAL: 9  __________________________________________________________________________________________________________________  Pressure [facility] Location: [facility]               3/10                                                                       3/13                                                                 3/20        Wound type: Pressure Injury     Pressure Injury Stage: 4, present on admission   Wound history/plan of care:   See above  Wound base: 30 % slough and bone, 70 % subcutaneous tissue, some  granulation starting       Palpation of the wound bed: normal      Drainage: scant     Description of drainage: serosanguinous     Measurements (length x width x depth, in cm) 10  x 12  x  2 cm      Tunneling N/A     Undermining N/A  Periwound skin: Intact      Color: normal and consistent with surrounding tissue      Temperature: normal   Odor: none  Pain: mild,   Pain intervention prior to dressing change: slow and gentle cares   Treatment goal: Heal , Increase granulation and Protection  STATUS: edges are more defined, thinning yellow slough at center over bone  Supplies ordered: ordered additional large dressing and rings   ________________________________________________________________________________________________________________     Negative pressure wound therapy applied to: Sacrum & R dominique   Last photo: see above  Wound due to: Pressure Injury   Wound history/plan of care:      Surgical date: 2/18/23     Date Negative Pressure Wound Therapy initiated: 2/20/23     Interventions in place: repositioning, pressure redistribution with device or dressing, specialty surface in use, moisture/incontinence management and offloading    Is patient s nutritional status compromised? yes   a. If yes, what interventions are in place? Protein supplements    Reason for initiating vac therapy? Presence of co-morbidities, High risk of infections, Need for accelerated granulation tissue and Prior history of delayed wound healing    Which?of?the?following?co-morbidities?apply? Immobility  a. If diabetic is patient on a diabetic management program? N/A     Is osteomyelitis present in wound? Y  a.  If yes what treatments are in place? N/A     Number of foam pieces removed from a wound (excluding foam for bridge) : 3 black foam to right hip (1 extra to fill the depth at the center of the wound  Verified this matched the number of foam pieces applied last dressing change: no    Number of foam pieces packed into trochanter  (excluding foam for bridge) : 1   To sacral wound: 1 black foam and ring along distal edge to prevent intrusion of stool, vashe soak x 10 minutes. Bridge between both wounds     _____________________________________________________________________________________________________________________________________________________________________     Pressure Injury Location: Bilateral heels          2/20 L heel                                                         2/27                                                                           3/13                                                             3/20             2/20 R heel                                                         2/27                                                                     3/13                                                                      3/20        Wound type: Pressure Injury     Pressure Injury Stage: Unstageable, present on admission      Wound history/plan of care:   See above  Wound base: 100 % eschar     Palpation of the wound bed: firm      Drainage: none     Description of drainage: none     Measurements (length x width x depth, in cm)              L heel: 4  x 5.5 cm               R heel: 2  x 2 cm      Tunneling N/A     Undermining N/A  Periwound skin: Intact and Scar tissue      Color: pink      Temperature: normal   Odor: none  Pain: absent, none  Pain intervention prior to dressing change: slow and gentle cares   Treatment goal: Maintain (prevention of deterioration) and offload pressure, reduce friction and shear  STATUS: stable   Supplies ordered: supplies stored on unit - no change in care needed           Treatment Plan:      Negative pressure wound therapy plan:  Wound location: Sacrum + R Trocanter    Change Days: Mon/Wed/Fri by WOC RN    Supplies (including all accessories) used: large Black foam , Adapt barrier ring and Cavilon no sting barrier film  Cleanse with Vashe prior to replacing VAC    Suction  "setting: -125   Methods used: Window paned all periwound skin with vac drape prior to applying sponge and barrier rings around entire sacral wound to protect from stool      Staff RN to assess integrity of dressing and ensure suction is set at appropriate level every shift.   Date canister. Chart canister output every shift. Change cannister weekly and PRN if full/occluded.     Remove foam dressing and replace with BID normal saline moist gauze dressing if:   -a dressing failure which cannot be repaired within 2 hours   -patient is discharging to home without a home pump   -patient is discharging to a facility outside the local area   -if a dressing is a \"Silver Foam\", remove before Radiation Therapy or MRI      The hospital VAC pump is not to be discharged with the patient.?Ensure to disconnect patient from machine prior to discharge. Then,    - If a home [employer] VAC pump has been delivered, connect home cannister to dressing tubing then connect cannister to home pump and turn on machine    - If transferring to a nearby facility with a [employer] vac, can disconnect and clamp tubing then cover end with glove so can be reconnected within 2 hours       Heels  1. Paint with betadine daily, allow to dry  2. Offload heels at all times while in bed, utilize prevlon boots     RECOMMEND PRIMARY TEAM ORDER: None, at this time  Education provided: importance of repositioning, wound progress, Infection prevention , Moisture management and Off-loading pressure  Discussed plan of care with: Patient and Nurse  WOC nurse follow-up plan: Monday/WednesdayFriday  Notify WOC if wound(s) deteriorate.  Nursing to notify the Provider(s) and re-consult the WOC Nurse if new skin concern.     DATA:      Current support surface: Standard  Standard gel/foam mattress (IsoFlex, Atmos air, etc)  Containment of urine/stool: Incontinence Protocol  BMI: Body mass index is 20.3 kg/m .   Active diet order: Orders Placed This Encounter      Regular Diet Adult   "   Output: I/O last 3 completed shifts:  In: 1440 [P.O.:1440]  Out: 200 [Urine:200]      Labs:   Recent Labs   Lab 03/20/23  0521   HGB 7.9*      Pressure injury risk assessment:   Sensory Perception: 3-->slightly limited  Moisture: 2-->very moist  Activity: 1-->bedfast  Mobility: 2-->very limited  Nutrition: 2-->probably inadequate  Friction and Shear: 2-->potential problem  Lalit Score: 12     MUNIRA Gomez RN CWOCN  Pager no longer is use, please contact through [employer]: [facility] [employer]

## 2023-03-22 NOTE — PROGRESS NOTES
[facility]  INITIAL VISIT NOTE  March 23, 2023    PRIMARY CARE PROVIDER AND CLINIC:  Aditi Allen [address] / [place] [address]    CHIEF COMPLAINT:  Hospital follow-up/Initial visit    HPI:    Cristal Preciado is a 72 year old  (1950) female who was seen at [facility] on March 23, 2023 for an initial visit.     Medical history is notable for cognitive impairment, CAD, hypertension, dyslipidemia, DM type II, diabetic neuropathy, CVA, CKD, chronic anemia, GERD, gout, lower extremity edema, UTI, hepatitis B, endometrial hyperplasia, allergic rhinitis, glaucoma, depression, COVID-19 infection, and pressure injury of left buttock and right hip.    Summary of hospital course:  Patient was hospitalized at [facility] from February 16 through March 21, 2023 for severe sepsis secondary to infected stage IV sacral and right greater trochanter decubitus ulcers with underlying sacral osteomyelitis.  Initial work-up was significant for sodium of 132, creatinine of 0.93, white count of 14.1, hemoglobin of 10.5, alkaline phosphatase of 318, AST of 54, ALT of 36, CRP of 337.7, and lactic acid of 6.4.  UA was abnormal showing cloudy urine, 31 WBCs, 29 RBCs, large leukocyte esterase, and many bacteria.  CT pelvis was remarkable for new sacral decubitus ulcer with subcutaneous emphysema extending into the surrounding soft tissues, new right greater trochanter decubitus ulcer, and cystitis.  Urine culture grew mixed urogenital brittani.  Sacral wound culture was positive for E. coli, Proteus mirabilis, Enterococcus faecalis, normal brittani, and Corynebacterium striatum.  Right hip wound culture yielded E. coli, Proteus mirabilis, Enterococcus faecalis, and Bacteroides.  She was started on IV Zosyn.  Patient was seen by surgical service and underwent debridement of sacral and right hip ulcers on March 17, 2023.  Postop hemoglobin dropped to 5.7 and she was transfused with 2 units of  PRBC.  She also received another blood transfusion on March 12 for hemoglobin of 6.4.  Multiple services were consulted inpatient including GI, palliative care, and nutrition due to poor oral intake, malnutrition, and FTT.  GI did not recommend EGD.  WOC RN was consulted for wound care.  It seems that  patient received tube feeding temporarily in the hospital.  TCU was recommended per therapies.  She was discharged on oral Augmentin for 4 weeks from March 17, per ID recommendations.    Patient is admitted to this facility for medical management, nursing care, and rehab.     Of note, history was obtained from patient, facility RN, and extensive review of the chart.    Today's visit:  Patient was seen in her room, while lying in bed.  She appears weak but comfortable.  She complains of tingling all over her body.  Surgical pain is fairly controlled.  She had a bowel movement this morning.  She denies fever, chills, chest pain, palpitation, dyspnea, nausea, vomiting, abdominal pain, or urinary symptoms.      CODE STATUS:   CPR/Full code     PAST MEDICAL HISTORY:   Severe sepsis secondary to infected stage IV sacral and right greater trochanter decubitus ulcers with underlying sacral osteomyelitis, s/p debridement on March 17, 2023  Bilateral heel pressure ulcers, unstageable  CAD, s/p stent to RCA in 2009  Hypertension  Dyslipidemia  DM type II  Diabetic neuropathy  CVA  CKD stage II; baseline creatinine 0.6-0.9  Chronic anemia, baseline hemoglobin 10-11  GERD  Gout  Lower extremity edema  UTI  Hepatitis B  Atypical endometrial hyperplasia, s/p robotic assisted total hysterectomy and bilateral salpingo-oophorectomy in August 2019  Allergic rhinitis  Glaucoma  Depression  Cognitive impairment  COVID-19 infection in December 2022  Moderate malnutrition  Failure to thrive     Past Medical History:   Diagnosis Date     AION (anterior ischaemic optic neuropathy), left eye     NAION LE     CAD (coronary artery disease)  3/2009    [facility]; Angio  UM- normal coronary arteries     Cataract      CVA (cerebral vascular accident) (H)     admitted at [facility]     Depression     on Cymbalta     Diabetes mellitus, type 2 (H)      Diabetic nephropathy (H)      Diabetic neuropathy (H)     severe     Diabetic retinopathy (H)      GERD (gastroesophageal reflux disease)      Hyperlipidemia      Hypertension     ECHO 2013, TDS, NL EF     Infection due to  novel coronavirus 2023     POAG (primary open-angle glaucoma)     adv BE     Seasonal allergies      Tubular adenoma of colon     repeat colonoscopy in        PAST SURGICAL HISTORY:   Past Surgical History:   Procedure Laterality Date     CARDIAC CATHETERIZATION       CATARACT EXTRACTION W/ INTRAOCULAR LENS IMPLANT Bilateral       SECTION        SECTION       COLONOSCOPY  7/15/2013    Tubular adenoma; repeat in 2018;Procedure: COMBINED COLONOSCOPY, SINGLE BIOPSY/POLYPECTOMY BY BIOPSY;;  Surgeon: Don King MD;  Tubular adenoma     COLONOSCOPY N/A 2020    Procedure: COLONOSCOPY;  Surgeon: Sid Amanda MD;  Location: [facility] GI     CORONARY STENT PLACEMENT  2004    RCA     DAVINCI HYSTERECTOMY TOTAL, BILATERAL SALPINGO-OOPHORECTOMY, COMBINED N/A 2019    Procedure: DaVinci Assisted Total Laparoscopic Hysterectomy, Removal Of Both Tubes And Ovaries;  Surgeon: Linh Cardoso MD;  Location: [facility] OR     EXTRACAPSULAR CATARACT EXTRATION WITH INTRAOCULAR LENS IMPLANT  11-10-09, 2-9-10    11-10-09 Lt, 2-9-10 Rt; Left eye 2012     HYSTERECTOMY TOTAL ABDOMINAL, BILATERAL SALPINGO-OOPHORECTOMY, COMBINED  2019    Procedure: DaVinci Assisted Total Laparoscopic Hysterectomy, Removal Of Both Tubes And Ovaries; Surgeon: Linh Cardoso MD; Location: [facility] OR       IRRIGATION AND DEBRIDEMENT TRUNK, COMBINED Right 2023    Procedure: DEBRIDEMENT OF SACRAL ULCER,  DEBRIDEMENT OF RIGHT HIP ULCER;  Surgeon: Shawna Ragsdale  MD;  Location: [facility] OR     PICC TRIPLE LUMEN PLACEMENT  2/17/2023          STENT, CORONARY, DELORES  2009    RCA       FAMILY HISTORY:   Family History   Problem Relation Age of Onset     Hypertension Mother      Cerebrovascular Disease Mother      Glaucoma Father      Cancer Father      Diabetes Sister      Glaucoma Sister      Cancer - colorectal Other      Cerebrovascular Disease Other      Skin Cancer No family hx of      Melanoma No family hx of      Anesthesia Reaction No family hx of      Deep Vein Thrombosis (DVT) No family hx of      Arthritis Brother      Diabetes Sister      Glaucoma Sister          SOCIAL HISTORY:  Social History     Tobacco Use     Smoking status: Former     Packs/day: 1.50     Years: 45.00     Pack years: 67.50     Types: Cigarettes     Start date: 1/1/1968     Quit date: 3/6/2013     Years since quitting: 10.0     Smokeless tobacco: Never   Substance Use Topics     Alcohol use: Not Currently       MEDICATIONS:  Current Outpatient Medications   Medication Sig Dispense Refill     acetaminophen (TYLENOL) 325 MG/10.15ML solution 20.3 mLs (650 mg) by Oral or G tube route every 6 hours as needed for mild pain or other (and adjunct with moderate or severe pain or per patient request)       amoxicillin-clavulanate (AUGMENTIN) 875-125 MG tablet Take 1 tablet by mouth every 12 hours       aspirin (ASPIRIN LOW DOSE) 81 MG chewable tablet 1 tablet (81 mg) by Oral or NG Tube route daily       atorvastatin (LIPITOR) 80 MG tablet 1 tablet (80 mg) by Oral or NG Tube route every evening       diclofenac (VOLTAREN) 1 % topical gel Apply 2 g topically 4 times daily       diphenhydrAMINE (BENADRYL) 25 MG capsule Take 1 capsule (25 mg) by mouth every 6 hours as needed for itching       dorzolamide-timolol (COSOPT) 2-0.5 % ophthalmic solution Place 1 drop into both eyes 2 times daily 10 mL 3     DULoxetine (CYMBALTA) 30 MG capsule 3 capsules (90 mg) by Oral or NG Tube route every morning        enoxaparin ANTICOAGULANT (LOVENOX) 30 MG/0.3ML syringe Inject 0.3 mLs (30 mg) Subcutaneous every 24 hours       insulin aspart (NOVOLOG PEN) 100 UNIT/ML pen Inject 1-7 Units Subcutaneous At Bedtime 15 mL      insulin aspart (NOVOLOG PEN) 100 UNIT/ML pen Inject 1-10 Units Subcutaneous 3 times daily (before meals) 15 mL      insulin glargine (LANTUS PEN) 100 UNIT/ML pen Inject 13 Units Subcutaneous At Bedtime 15 mL      insulin glargine (LANTUS PEN) 100 UNIT/ML pen Inject 15 Units Subcutaneous every morning (before breakfast) 15 mL      latanoprost (XALATAN) 0.005 % ophthalmic solution Place 1 drop into both eyes At Bedtime 5 mL 2     melatonin 1 MG TABS tablet Take 1 tablet (1 mg) by mouth nightly as needed for sleep       melatonin 3 MG tablet Take 3 mg by mouth At Bedtime       metFORMIN (GLUCOPHAGE-XR) 750 MG 24 hr tablet Take 1 tablet (750 mg) by mouth 2 times daily (with meals)       methocarbamol (ROBAXIN) 500 MG tablet Take 1 tablet (500 mg) by mouth 4 times daily as needed for muscle spasms       metoprolol tartrate (LOPRESSOR) 50 MG tablet 1 tablet (50 mg) by Oral or NG Tube route 2 times daily       mirtazapine (REMERON) 15 MG tablet 1 tablet (15 mg) by Oral or NG Tube route At Bedtime       multivitamin w/minerals (THERA-VIT-M) tablet 1 tablet by Oral or Feeding Tube route daily       nitroGLYCERIN (NITROSTAT) 0.4 MG SL tablet Place 0.4 mg under the tongue every 5 minutes as needed.       Nutritional Supplements (KIMMIE) PACK Take 1 packet by mouth 2 times daily (with meals)       olopatadine (PATANOL) 0.1 % ophthalmic solution daily       oxyCODONE (ROXICODONE) 5 MG tablet Take 0.5 tablets (2.5 mg) by mouth every 4 hours as needed for moderate pain (4-6) (if pain not managed with non-pharmacological and non-opioid interventions) 15 tablet 0     pantoprazole (PROTONIX) 40 MG EC tablet Take 1 tablet (40 mg) by mouth every morning (before breakfast)       polyvinyl alcohol (LIQUIFILM TEARS) 1.4 % ophthalmic  "solution Place 1 drop into both eyes 3 times daily       senna-docusate (SENOKOT-S/PERICOLACE) 8.6-50 MG tablet Take 2 tablets by mouth 2 times daily as needed for constipation 60 tablet 0     simethicone (MYLICON) 80 MG chewable tablet 1 tablet (80 mg) by Oral or NG Tube route 4 times daily as needed       traZODone (DESYREL) 50 MG tablet 1 tablet (50 mg) by Oral or NG Tube route At Bedtime         MED REC REQUIRED  Post Medication Reconciliation Status: discharge medications reconciled and changed, per note/orders      ALLERGIES:  Allergies   Allergen Reactions     Dust Mites Other (See Comments)     Sneezing runny eyes and nose.     Other Environmental Allergy Hives     Mountain Dew     Pollen Extract Other (See Comments)     Sneezing runny eyes and nose.     Walnuts [Nuts] Hives       ROS:  10 point ROS were negative other than the symptoms noted above in the HPI.    PHYSICAL EXAM:  Vital signs were reviewed in the chart.  Vital Signs: /74   Pulse 66   Temp 98.2  F (36.8  C)   Resp 18   Ht 1.702 m (5' 7\")   Wt 67.3 kg (148 lb 4.8 oz)   SpO2 97%   BMI 23.23 kg/m    General: Weak appearing but comfortable and in no acute distress  HEENT: Conjunctival pallor, no scleral icterus or injection, moist oral mucosa  Cardiovascular: Normal S1, S2, RRR  Respiratory: Lungs clear to auscultation bilaterally  GI: Abdomen soft, non-tender, non-distended, +BS  Extremities: No LE edema; bilateral heel protectors  Neuro: CX II-XII grossly intact; ROM in all four extremities grossly intact  Psych: Alert and oriented almost x3; normal affect  Skin: No acute rash; 2 wound vacs in place    LABORATORY/IMAGING DATA:  All relevant labs and imaging data in [facility] and/or Care Everywhere were personally reviewed today.      Most Recent 3 CBC's:Recent Labs   Lab Test 03/21/23  0527 03/20/23  0521 03/19/23  0819 03/13/23  1158 03/12/23  0712 03/10/23  1215 03/10/23  0651 03/06/23  0452 03/03/23  0645 03/02/23  0517   WBC  --   --  "  --   --   --   --  4.5  --  5.0 5.1   HGB 8.5* 7.9* 8.1*   < > 6.4*   < > 7.0*  --  8.4* 8.0*   MCV  --   --   --   --   --   --  96  --  94 93   PLT  --   --   --   --  250  --  159 267 248 242    < > = values in this interval not displayed.     Most Recent 3 BMP's:Recent Labs   Lab Test 03/21/23  1223 03/21/23  0842 03/20/23  2137 03/20/23  0755 03/20/23  0521 03/19/23  0836 03/19/23  0819 03/18/23  0720 03/18/23  0500 03/12/23  0829 03/12/23  0712 03/11/23  0805 03/11/23  0513 03/10/23  0809 03/10/23  0808   NA  --   --   --   --   --   --   --   --   --   --  140  --  137  --  135*   POTASSIUM  --   --   --   --  3.8  --  3.9  --  4.0   < > 4.1  --  4.4  --  4.3   CHLORIDE  --   --   --   --   --   --   --   --   --   --  105  --  107  --  109*   CO2  --   --   --   --   --   --   --   --   --   --  26  --  25  --  19*   BUN  --   --   --   --   --   --   --   --   --   --  13.3  --  13.4  --  7.4*   CR  --   --   --   --   --   --  0.87  --   --   --  0.68  --  0.57  --  0.62   ANIONGAP  --   --   --   --   --   --   --   --   --   --  9  --  5*  --  7   OLAF  --   --   --   --   --   --   --   --   --   --  7.3*  --  7.6*  --  7.3*   * 112* 143*   < >  --    < >  --    < >  --    < > 314*   < > 401*   < > 292*    < > = values in this interval not displayed.     Most Recent 2 LFT's:Recent Labs   Lab Test 03/13/23  0620 03/03/23  0645   AST 60* 40*   ALT 32 14   ALKPHOS 248* 160*   BILITOTAL 0.2 0.3         ASSESSMENT/PLAN:  Severe sepsis, resolved,  Infected stage IV sacral and right greater trochanter decubitus ulcers with sacral osteomyelitis, s/p debridement on March 17, 2023,  Bilateral heel pressure ulcers, unstageable, present on admission.  Patient is hemodynamically stable.  She was treated with IV Zosyn in the hospital and discharged on oral Augmentin to continue for 4 weeks from March 17, per ID recommendations.  Plan:  Continue Augmentin 875-125 mg every 12 hours through April 13,  2023  Continue pain management with acetaminophen 650 mg every 6 hours PRN and oxycodone 2.5 mg every 4 hours PRN  Continue wound care/wound VACs change per instructions  Follow-up with surgery as directed    Acute blood loss anemia,  Chronic anemia  Baseline hemoglobin 10-11.  Patient received  total of 3 units of PRBC in the hospital for hemoglobin less than 7.  Last hemoglobin was 8.5 on March 21.  Plan:  Monitor hemoglobin periodically  Transfuse PRN for hemoglobin less than 7    Hypomagnesemia.  Last magnesium level of 1.5 on March 20.  Plan:  Start magnesium oxide 400 mg twice daily for 3 days  Recheck magnesium level on Monday 27    CAD, s/p stent to RCA in 2009,  History of CVA,  Essential hypertension,  Dyslipidemia.  Stable.  Blood pressure is fairly controlled.  Plan:  Continue 81 mg daily  Continue atorvastatin 80 mg daily  Continue metoprolol 50 mg twice daily  PRN NTG SL  Monitor blood pressure and cardiac status    DM type II,  Diabetic neuropathy.  Last hemoglobin A1c was 7.6% in January 2023.  PTA liraglutide was discontinued in the hospital.  Blood glucose levels are in the range of .  Plan:  Continue metformin  mg twice daily  Decrease insulin glargine to 10 units subcu twice daily  Continue sliding-scale NovoLog  Monitor blood glucose    CKD stage II.  Baseline creatinine 0.6-0.9.  Last creatinine was 0.87 on March 19.  Plan:  Avoid NSAIDs and nephrotoxins  Monitor renal function periodically    GERD.  Plan:  Continue pantoprazole 40 mg daily    History of hepatitis B.  Plan:  Follow-up as outpatient    Glaucoma.  Plan:  Continue PTA Xalatan and Cosopt ophthalmic drops    Depression,  Insomnia.  Plan:  Continue mirtazapine 15 mg at bedtime  Continue duloxetine 90 mg daily  Continue trazodone 50 mg at bedtime  Continue melatonin 3 mg at bedtime  Monitor symptoms  Refer to ACP PRN    Cognitive impairment.  Per chart review.  On today's examination, she is oriented almost  x3.  Plan:  Standard delirium precautions  Formal cognitive evaluation per OT for safe discharge planning    Moderate malnutrition,  Failure to thrive.  Plan:  Continue dietary supplements  Facility dietitian to follow the patient    Slow transit constipation.  Plan:  Continue the bowel regimen    Physical deconditioning.  Plan:  Continue PT/OT evaluation and therapy    DVT prophylaxis.  Plan:  Continue enoxaparin 30 mg subcu every 24 hours  Monitor CBC weekly while patient stays on enoxaparin      Orders written by provider at facility:  Order clarification: continue Augmentin 875-125 mg every 12 hours through April 13, 2023 for infected decubitus ulcers and osteomyelitis  Magnesium oxide 400 mg p.o. twice daily for 3 days, DX: Hypomagnesemia  CBC on March 27, DX: Anemia  BMP and magnesium level on March 27, DX: CKD, hypomagnesemia  Dietitian consult for malnutrition  Decrease insulin glargine to 10 units subcu twice daily, hold for glucose less than 120, DX: DM type II      Recommendation by provider at facility:  Weekly CBC while patient stays on enoxaparin        Disclaimer: This note may contain text created using speech-recognition software and may contain unintended word substitutions.      Electronically signed by:  Jaja Vail MD

## 2023-03-22 NOTE — PROGRESS NOTES
Marco Preciado  1950    1) Sliding scale  Novolog TID before meals:  BS <200 give   0 units  -249  2 units  -299  4 units  -349  6 units  -399  8 units  -450  10 units  BS>450  12 units    2) Hold metoprolol if HR<60, SBP<100  LAMONT Craig CNP on 3/22/2023 at 11:12 AM

## 2023-03-23 NOTE — PATIENT INSTRUCTIONS
Orders for Cristal Preciado (1950), MR# [id number]:    1) Order clarification: continue Augmentin 875-125 mg every 12 hours through April 13, 2023 for infected decubitus ulcers and osteomyelitis  2) Magnesium oxide 400 mg p.o. twice daily for 3 days, DX: Hypomagnesemia  3) CBC on March 27, DX: Anemia  4) BMP and magnesium level on March 27, DX: CKD, hypomagnesemia  5) Dietitian consult for malnutrition  6) Decrease insulin glargine to 10 units subcu twice daily, hold for glucose less than 120, DX: DM type II      Jaja Vail MD  [facility]

## 2023-03-24 NOTE — PROGRESS NOTES
[facility]  March 27, 2023      CHIEF COMPLAINT:  Episodic visit    HPI:    Cristal Preciado is a 72 year old  (1950), who is being seen today for an episodic care visit at [facility].     Medical history is notable for cognitive impairment, CAD, hypertension, dyslipidemia, DM type II, diabetic neuropathy, CVA, CKD, chronic anemia, GERD, gout, lower extremity edema, UTI, hepatitis B, endometrial hyperplasia, allergic rhinitis, glaucoma, depression, COVID-19 infection, and pressure injury of left buttock and right hip.     Summary of hospital course:  Patient was hospitalized at [facility] from February 16 through March 21, 2023 for severe sepsis secondary to infected stage IV sacral and right greater trochanter decubitus ulcers with underlying sacral osteomyelitis.  Initial work-up was significant for sodium of 132, creatinine of 0.93, white count of 14.1, hemoglobin of 10.5, alkaline phosphatase of 318, AST of 54, ALT of 36, CRP of 337.7, and lactic acid of 6.4.  UA was abnormal showing cloudy urine, 31 WBCs, 29 RBCs, large leukocyte esterase, and many bacteria.  CT pelvis was remarkable for new sacral decubitus ulcer with subcutaneous emphysema extending into the surrounding soft tissues, new right greater trochanter decubitus ulcer, and cystitis.  Urine culture grew mixed urogenital brittani.  Sacral wound culture was positive for E. coli, Proteus mirabilis, Enterococcus faecalis, normal brittani, and Corynebacterium striatum.  Right hip wound culture yielded E. coli, Proteus mirabilis, Enterococcus faecalis, and Bacteroides.  She was started on IV Zosyn.  She was seen by surgical service and underwent debridement of sacral and right hip ulcers on March 17, 2023.  Postop hemoglobin dropped to 5.7 and she was transfused with 2 units of PRBC.  She also received another blood transfusion on March 12 for hemoglobin of 6.4.  Multiple services were consulted inpatient  including GI, palliative care, and nutrition due to poor oral intake, malnutrition, and FTT.  GI did not recommend EGD.  WOC RN was consulted for wound care.  It seems that  patient received tube feeding temporarily in the hospital.  TCU was recommended per therapies.  She was discharged on oral Augmentin for 4 weeks from March 17, per ID recommendations. She was then admitted to this facility for medical management, nursing care, and rehab.       Today's visit:  Patient was seen in her room, while lying bed.  She appears weak but comfortable.  She currently has no pain.  Wound VACs are in place.  She had a bowel movement this morning.  Her SLUMS score is 16/30.  Blood glucose levels are in the range of .    CODE STATUS:   CPR/Full code     PAST MEDICAL HISTORY:   Severe sepsis secondary to infected stage IV sacral and right greater trochanter decubitus ulcers with underlying sacral osteomyelitis, s/p debridement on March 17, 2023  Bilateral heel pressure ulcers, unstageable  CAD, s/p stent to RCA in 2009  Hypertension  Dyslipidemia  DM type II  Diabetic neuropathy  CVA  CKD stage II; baseline creatinine 0.6-0.9  Chronic anemia, baseline hemoglobin 10-11  GERD  Gout  Lower extremity edema  UTI  Hepatitis B  Atypical endometrial hyperplasia, s/p robotic assisted total hysterectomy and bilateral salpingo-oophorectomy in August 2019  Allergic rhinitis  Glaucoma  Depression  COVID-19 infection in December 2022  Moderate malnutrition  Failure to thrive   Cognitive impairment (SLUMS score 16/30 in March 2023)      Past Surgical, Family, and Social History were reviewed and updated in EPIC.    Current Outpatient Medications   Medication Sig Dispense Refill     acetaminophen (TYLENOL) 325 MG/10.15ML solution 20.3 mLs (650 mg) by Oral or G tube route every 6 hours as needed for mild pain or other (and adjunct with moderate or severe pain or per patient request)       amoxicillin-clavulanate (AUGMENTIN) 875-125 MG  tablet Take 1 tablet by mouth every 12 hours (Patient taking differently: Take 1 tablet by mouth every 12 hours Through April 13, 2023)       aspirin (ASPIRIN LOW DOSE) 81 MG chewable tablet 1 tablet (81 mg) by Oral or NG Tube route daily       atorvastatin (LIPITOR) 80 MG tablet 1 tablet (80 mg) by Oral or NG Tube route every evening       diclofenac (VOLTAREN) 1 % topical gel Apply 2 g topically 4 times daily       diphenhydrAMINE (BENADRYL) 25 MG capsule Take 1 capsule (25 mg) by mouth every 6 hours as needed for itching       dorzolamide-timolol (COSOPT) 2-0.5 % ophthalmic solution Place 1 drop into both eyes 2 times daily 10 mL 3     DULoxetine (CYMBALTA) 30 MG capsule 3 capsules (90 mg) by Oral or NG Tube route every morning       enoxaparin ANTICOAGULANT (LOVENOX) 30 MG/0.3ML syringe Inject 0.3 mLs (30 mg) Subcutaneous every 24 hours       insulin aspart (NOVOLOG PEN) 100 UNIT/ML pen Inject 1-7 Units Subcutaneous At Bedtime 15 mL      insulin aspart (NOVOLOG PEN) 100 UNIT/ML pen Inject 1-10 Units Subcutaneous 3 times daily (before meals) (Patient taking differently: Inject 2-12 Units Subcutaneous 3 times daily as needed 1) Sliding scale  Novolog TID before meals:  BS <200 give               0 units  -249                 2 units  -299                 4 units  -349                 6 units  -399                 8 units  -450                 10 units  BS>450                       12 units) 15 mL      insulin glargine (LANTUS VIAL) 100 UNIT/ML vial Inject 10 Units Subcutaneous 2 times daily Hold for blood glucose less than 120       latanoprost (XALATAN) 0.005 % ophthalmic solution Place 1 drop into both eyes At Bedtime 5 mL 2     magnesium oxide (MAG-OX) 400 MG tablet Take 400 mg by mouth 2 times daily       melatonin 1 MG TABS tablet Take 1 tablet (1 mg) by mouth nightly as needed for sleep       melatonin 3 MG tablet Take 3 mg by mouth At Bedtime       metFORMIN (GLUCOPHAGE-XR) 750  MG 24 hr tablet Take 1 tablet (750 mg) by mouth 2 times daily (with meals)       methocarbamol (ROBAXIN) 500 MG tablet Take 1 tablet (500 mg) by mouth 4 times daily as needed for muscle spasms       metoprolol tartrate (LOPRESSOR) 50 MG tablet 1 tablet (50 mg) by Oral or NG Tube route 2 times daily       mirtazapine (REMERON) 15 MG tablet 1 tablet (15 mg) by Oral or NG Tube route At Bedtime       multivitamin w/minerals (THERA-VIT-M) tablet 1 tablet by Oral or Feeding Tube route daily       nitroGLYCERIN (NITROSTAT) 0.4 MG SL tablet Place 0.4 mg under the tongue every 5 minutes as needed.       Nutritional Supplements (KIMMIE) PACK Take 1 packet by mouth 2 times daily (with meals)       olopatadine (PATANOL) 0.1 % ophthalmic solution daily       oxyCODONE (ROXICODONE) 5 MG tablet Take 0.5 tablets (2.5 mg) by mouth every 4 hours as needed for moderate pain (4-6) (if pain not managed with non-pharmacological and non-opioid interventions) 15 tablet 0     pantoprazole (PROTONIX) 40 MG EC tablet Take 1 tablet (40 mg) by mouth every morning (before breakfast)       polyvinyl alcohol (LIQUIFILM TEARS) 1.4 % ophthalmic solution Place 1 drop into both eyes 3 times daily       senna-docusate (SENOKOT-S/PERICOLACE) 8.6-50 MG tablet Take 2 tablets by mouth 2 times daily as needed for constipation 60 tablet 0     simethicone (MYLICON) 80 MG chewable tablet 1 tablet (80 mg) by Oral or NG Tube route 4 times daily as needed       traZODone (DESYREL) 50 MG tablet 1 tablet (50 mg) by Oral or NG Tube route At Bedtime         MED REC REQUIRED  Post Medication Reconciliation Status: medication reconcilation previously completed during another office visit      ALLERGIES: Dust mites, Other environmental allergy, Pollen extract, and Walnuts [nuts]    REVIEW OF SYSTEMS:  10 point ROS were negative other than the symptoms noted above in the HPI.    PHYSICAL EXAM:  Vital signs were reviewed in the chart.  Vital Signs:  /85   Pulse 76   " Temp 97.7  F (36.5  C)   Resp 17   Ht 1.702 m (5' 7\")   Wt 67.3 kg (148 lb 4.8 oz)   SpO2 96%   BMI 23.23 kg/m    General: Weak appearing but comfortable and in no acute distress  HEENT: Conjunctival pallor, no scleral icterus or injection, moist oral mucosa  Cardiovascular: Normal S1, S2, RRR  Respiratory: Lungs clear to auscultation bilaterally  GI: Abdomen soft, non-tender, non-distended, +BS  Extremities: No LE edema  Neuro: CX II-XII grossly intact; ROM in all four extremities grossly intact  Psych: Alert and oriented almost x3; normal affect  Skin: Sacral and right greater trochanteric wound VACs in place    LABORATORY/IMAGING DATA:  All relevant labs and imaging data in [facility] and/or Care Everywhere were personally reviewed today.      Most Recent 3 CBC's:Recent Labs   Lab Test 03/21/23  0527 03/20/23  0521 03/19/23  0819 03/13/23  1158 03/12/23  0712 03/10/23  1215 03/10/23  0651 03/06/23  0452 03/03/23  0645 03/02/23  0517   WBC  --   --   --   --   --   --  4.5  --  5.0 5.1   HGB 8.5* 7.9* 8.1*   < > 6.4*   < > 7.0*  --  8.4* 8.0*   MCV  --   --   --   --   --   --  96  --  94 93   PLT  --   --   --   --  250  --  159 267 248 242    < > = values in this interval not displayed.     Most Recent 3 BMP's:Recent Labs   Lab Test 03/21/23  1223 03/21/23  0842 03/20/23  2137 03/20/23  0755 03/20/23  0521 03/19/23  0836 03/19/23  0819 03/18/23  0720 03/18/23  0500 03/12/23  0829 03/12/23  0712 03/11/23  0805 03/11/23  0513 03/10/23  0809 03/10/23  0808   NA  --   --   --   --   --   --   --   --   --   --  140  --  137  --  135*   POTASSIUM  --   --   --   --  3.8  --  3.9  --  4.0   < > 4.1  --  4.4  --  4.3   CHLORIDE  --   --   --   --   --   --   --   --   --   --  105  --  107  --  109*   CO2  --   --   --   --   --   --   --   --   --   --  26  --  25  --  19*   BUN  --   --   --   --   --   --   --   --   --   --  13.3  --  13.4  --  7.4*   CR  --   --   --   --   --   --  0.87  --   --   --  0.68  --  " 0.57  --  0.62   ANIONGAP  --   --   --   --   --   --   --   --   --   --  9  --  5*  --  7   OLAF  --   --   --   --   --   --   --   --   --   --  7.3*  --  7.6*  --  7.3*   * 112* 143*   < >  --    < >  --    < >  --    < > 314*   < > 401*   < > 292*    < > = values in this interval not displayed.     Most Recent 2 LFT's:Recent Labs   Lab Test 03/13/23  0620 03/03/23  0645   AST 60* 40*   ALT 32 14   ALKPHOS 248* 160*   BILITOTAL 0.2 0.3       ASSESSMENT/PLAN:  Severe sepsis, resolved,  Infected stage IV sacral and right greater trochanter decubitus ulcers with sacral osteomyelitis, s/p debridement on March 17, 2023,  Bilateral heel pressure ulcers, unstageable, present on admission.  She was treated with IV Zosyn in the hospital and discharged on oral Augmentin to continue for 4 weeks from March 17, per ID recommendations.  Stable and afebrile.  Patient currently has no pain.  Plan:  Continue Augmentin 875-125 mg every 12 hours through April 13, 2023  Continue pain management with acetaminophen 650 mg every 6 hours PRN and oxycodone 2.5 mg every 4 hours PRN  Continue DVT prophylaxis with enoxaparin 30 mg subcu daily until patient is ambulatory  Continue wound care/wound VACs change per instructions  Follow-up with surgery as directed     Acute blood loss anemia,  Chronic anemia  Baseline hemoglobin 10-11.  Patient received  total of 3 units of PRBC in the hospital for hemoglobin <7.  Last hemoglobin was 8.5 on March 21.  Plan:  Monitor hemoglobin periodically  Transfuse PRN for hemoglobin less than 7     Hypomagnesemia.  Last magnesium level of 1.5 on March 20; started on magnesium oxide 400 mg 3 times daily for 3 days from March 23 through March 26.  Plan:  Follow-up magnesium level from today, March 27     CAD, s/p stent to RCA in 2009,  History of CVA,  Essential hypertension,  Dyslipidemia.  Stable.  Blood pressure is fairly controlled.  Plan:  Continue 81 mg daily  Continue atorvastatin 80 mg  daily  Continue metoprolol 50 mg twice daily  PRN NTG SL  Monitor blood pressure and cardiac status     DM type II,  Diabetic neuropathy.  Last hemoglobin A1c was 7.6% in January 2023.  PTA liraglutide was discontinued in the hospital.  PTA insulin glargine was reduced to 10 units subcu twice daily on March 23.  Blood glucose levels are in the range of .  Plan:  Continue metformin  mg twice daily  Decrease insulin glargine to 8 units subcu twice daily; hold for blood glucose <120  Continue sliding-scale NovoLog  Monitor blood glucose     CKD stage II.  Baseline creatinine 0.6-0.9.  Last creatinine was 0.87 on March 19.  Plan:  Avoid NSAIDs and nephrotoxins  Monitor renal function periodically     GERD.  Plan:  Continue pantoprazole 40 mg daily     History of hepatitis B.  Plan:  Follow-up as outpatient     Glaucoma.  Plan:  Continue PTA Xalatan and Cosopt ophthalmic drops     Depression,  Insomnia.  Plan:  Continue mirtazapine 15 mg at bedtime  Continue duloxetine 90 mg daily  Continue trazodone 50 mg at bedtime  Continue melatonin 3 mg at bedtime  Monitor symptoms  Refer to ACP PRN     Cognitive impairment.  SLUMS score 16/30.  On today's examination, she is oriented almost x3.  Plan:  Standard delirium precautions  Formal cognitive evaluation per OT for safe discharge planning     Moderate malnutrition,  Failure to thrive.  Plan:  Continue dietary supplements  Facility dietitian is following the patient     Slow transit constipation.  Plan:  Continue the bowel regimen     Physical deconditioning.  Plan:  Continue PT/OT evaluation and therapy        Orders written by provider at facility:  CBC on April 3, DX: Anemia  Decrease insulin glargine to 8 units subcu twice daily; hold for blood glucose <120, DX: DM type II      Recommendation by provider at facility:  Follow-up on CBC, BMP, magnesium from today, March 27          Disclaimer: This note may contain text created using speech-recognition software  and may contain unintended word substitutions.      Electronically signed by  Jaja Vail MD

## 2023-03-27 NOTE — PATIENT INSTRUCTIONS
Orders for Cristal Preciado (1950), MR# [id number]:    1) CBC on April 3, DX: Anemia  2) Decrease insulin glargine to 8 units subcu twice daily; hold for blood glucose <120, DX: DM type II      Jaja Vail MD  [facility]

## 2023-03-29 NOTE — PROGRESS NOTES
[facility]  March 30, 2023      CHIEF COMPLAINT:  Episodic visit    HPI:    Cristal Preciado is a 73 year old  (1950), who is being seen today for an episodic care visit at [facility].     Medical history is notable for cognitive impairment, CAD, hypertension, dyslipidemia, DM type II, diabetic neuropathy, CVA, CKD, chronic anemia, GERD, gout, lower extremity edema, UTI, hepatitis B, endometrial hyperplasia, allergic rhinitis, glaucoma, depression, COVID-19 infection, and pressure injury of left buttock and right hip.     Summary of hospital course:  Patient was hospitalized at [facility] from February 16 through March 21, 2023 for severe sepsis secondary to infected stage IV sacral and right greater trochanter decubitus ulcers with underlying sacral osteomyelitis.  Initial work-up was significant for sodium of 132, creatinine of 0.93, white count of 14.1, hemoglobin of 10.5, alkaline phosphatase of 318, AST of 54, ALT of 36, CRP of 337.7, and lactic acid of 6.4.  UA was abnormal showing cloudy urine, 31 WBCs, 29 RBCs, large leukocyte esterase, and many bacteria.  CT pelvis was remarkable for new sacral decubitus ulcer with subcutaneous emphysema extending into the surrounding soft tissues, new right greater trochanter decubitus ulcer, and cystitis.  Urine culture grew mixed urogenital brittani.  Sacral wound culture was positive for E. coli, Proteus mirabilis, Enterococcus faecalis, normal brittani, and Corynebacterium striatum.  Right hip wound culture yielded E. coli, Proteus mirabilis, Enterococcus faecalis, and Bacteroides.  She was started on IV Zosyn.  She was seen by surgical service and underwent debridement of sacral and right hip ulcers on March 17, 2023.  Postop hemoglobin dropped to 5.7 and she was transfused with 2 units of PRBC.  She also received another blood transfusion on March 12 for hemoglobin of 6.4.  Multiple services were consulted inpatient  including GI, palliative care, and nutrition due to poor oral intake, malnutrition, and FTT.  GI did not recommend EGD.  WOC RN was consulted for wound care.  It seems that  patient received tube feeding temporarily in the hospital.  TCU was recommended per therapies.  She was discharged on oral Augmentin for 4 weeks from March 17, per ID recommendations. She was then admitted to this facility for medical management, nursing care, and rehab.       Today's visit:  Patient was seen in her room, while lying in bed.  She appears weak and still requires Vernell lift for transfers.  Her appetite and oral intake are improving.  She complains of pain in her bottom.  Wound VACs are in place.  She had a bowel movement last night.  She denies fever, chills, chest pain, palpitation, nausea, vomiting, abdominal pain, or urinary symptoms.    CODE STATUS:   CPR/Full code     PAST MEDICAL HISTORY:   Severe sepsis secondary to infected stage IV sacral and right greater trochanter decubitus ulcers with underlying sacral osteomyelitis, s/p debridement on March 17, 2023  Bilateral heel pressure ulcers, unstageable  CAD, s/p stent to RCA in 2009  Hypertension  Dyslipidemia  DM type II  Diabetic neuropathy  CVA  CKD stage II; baseline creatinine 0.6-0.9  Chronic anemia, baseline hemoglobin 10-11  GERD  Gout  Lower extremity edema  UTI  Hepatitis B  Atypical endometrial hyperplasia, s/p robotic assisted total hysterectomy and bilateral salpingo-oophorectomy in August 2019  Allergic rhinitis  Glaucoma  Depression  COVID-19 infection in December 2022  Moderate malnutrition  Failure to thrive   Cognitive impairment (SLUMS score 16/30 in March 2023)      Past Surgical, Family, and Social History were reviewed and updated in [facility].    Current Outpatient Medications   Medication Sig Dispense Refill     acetaminophen (TYLENOL) 325 MG/10.15ML solution 20.3 mLs (650 mg) by Oral or G tube route every 6 hours as needed for mild pain or other (and  adjunct with moderate or severe pain or per patient request)       amoxicillin-clavulanate (AUGMENTIN) 875-125 MG tablet Take 1 tablet by mouth every 12 hours (Patient taking differently: Take 1 tablet by mouth every 12 hours Through April 13, 2023)       aspirin (ASPIRIN LOW DOSE) 81 MG chewable tablet 1 tablet (81 mg) by Oral or NG Tube route daily       atorvastatin (LIPITOR) 80 MG tablet 1 tablet (80 mg) by Oral or NG Tube route every evening       diclofenac (VOLTAREN) 1 % topical gel Apply 2 g topically 4 times daily       diphenhydrAMINE (BENADRYL) 25 MG capsule Take 1 capsule (25 mg) by mouth every 6 hours as needed for itching       dorzolamide-timolol (COSOPT) 2-0.5 % ophthalmic solution Place 1 drop into both eyes 2 times daily 10 mL 3     DULoxetine (CYMBALTA) 30 MG capsule 3 capsules (90 mg) by Oral or NG Tube route every morning       enoxaparin ANTICOAGULANT (LOVENOX) 30 MG/0.3ML syringe Inject 0.3 mLs (30 mg) Subcutaneous every 24 hours       insulin aspart (NOVOLOG PEN) 100 UNIT/ML pen Inject 1-7 Units Subcutaneous At Bedtime 15 mL      insulin aspart (NOVOLOG PEN) 100 UNIT/ML pen Inject 1-10 Units Subcutaneous 3 times daily (before meals) (Patient taking differently: Inject 2-12 Units Subcutaneous 3 times daily as needed 1) Sliding scale  Novolog TID before meals:  BS <200 give               0 units  -249                 2 units  -299                 4 units  -349                 6 units  -399                 8 units  -450                 10 units  BS>450                       12 units) 15 mL      insulin glargine (LANTUS VIAL) 100 UNIT/ML vial Inject 8 Units Subcutaneous 2 times daily Hold for blood glucose less than 120       latanoprost (XALATAN) 0.005 % ophthalmic solution Place 1 drop into both eyes At Bedtime 5 mL 2     melatonin 1 MG TABS tablet Take 1 tablet (1 mg) by mouth nightly as needed for sleep       melatonin 3 MG tablet Take 3 mg by mouth At Bedtime        metFORMIN (GLUCOPHAGE-XR) 750 MG 24 hr tablet Take 1 tablet (750 mg) by mouth 2 times daily (with meals)       methocarbamol (ROBAXIN) 500 MG tablet Take 1 tablet (500 mg) by mouth 4 times daily as needed for muscle spasms       metoprolol tartrate (LOPRESSOR) 50 MG tablet 1 tablet (50 mg) by Oral or NG Tube route 2 times daily       mirtazapine (REMERON) 15 MG tablet 1 tablet (15 mg) by Oral or NG Tube route At Bedtime       multivitamin w/minerals (THERA-VIT-M) tablet 1 tablet by Oral or Feeding Tube route daily       nitroGLYCERIN (NITROSTAT) 0.4 MG SL tablet Place 0.4 mg under the tongue every 5 minutes as needed.       Nutritional Supplements (KIMMIE) PACK Take 1 packet by mouth 2 times daily (with meals)       olopatadine (PATANOL) 0.1 % ophthalmic solution daily       oxyCODONE (ROXICODONE) 5 MG tablet Take 0.5 tablets (2.5 mg) by mouth every 4 hours as needed for moderate pain (4-6) (if pain not managed with non-pharmacological and non-opioid interventions) 15 tablet 0     pantoprazole (PROTONIX) 40 MG EC tablet Take 1 tablet (40 mg) by mouth every morning (before breakfast)       polyvinyl alcohol (LIQUIFILM TEARS) 1.4 % ophthalmic solution Place 1 drop into both eyes 3 times daily       senna-docusate (SENOKOT-S/PERICOLACE) 8.6-50 MG tablet Take 2 tablets by mouth 2 times daily as needed for constipation 60 tablet 0     simethicone (MYLICON) 80 MG chewable tablet 1 tablet (80 mg) by Oral or NG Tube route 4 times daily as needed       traZODone (DESYREL) 50 MG tablet 1 tablet (50 mg) by Oral or NG Tube route At Bedtime         MED REC REQUIRED  Post Medication Reconciliation Status: Medications were reconciled and changed as below.    ALLERGIES: Dust mites, Other environmental allergy, Pollen extract, and Walnuts [nuts]    REVIEW OF SYSTEMS:  10 point ROS were negative other than the symptoms noted above in the HPI.    PHYSICAL EXAM:  Vital signs were reviewed in the chart.  Vital Signs:  /70    "Pulse 92   Temp 98.1  F (36.7  C)   Resp 18   Ht 1.702 m (5' 7\")   Wt 67.3 kg (148 lb 4.8 oz)   SpO2 92%   BMI 23.23 kg/m    General: Weak and frail appearing but comfortable and in no acute distress  HEENT: Conjunctival pallor, no scleral icterus or injection, moist oral mucosa  Cardiovascular: Normal S1, S2, RRR  Respiratory: Lungs clear to auscultation bilaterally  GI: Abdomen soft, non-tender, non-distended, +BS  Extremities: No LE edema  Neuro: CX II-XII grossly intact; ROM in all four extremities grossly intact  Psych: Alert and oriented almost x3; depressed affect  Skin: Wound VACs are in place    LABORATORY/IMAGING DATA:  All relevant labs and imaging data in [facility] and/or Care Everywhere were personally reviewed today.      Most Recent 3 CBC's:Recent Labs   Lab Test 03/21/23  0527 03/20/23  0521 03/19/23  0819 03/13/23  1158 03/12/23  0712 03/10/23  1215 03/10/23  0651 03/06/23  0452 03/03/23  0645 03/02/23  0517   WBC  --   --   --   --   --   --  4.5  --  5.0 5.1   HGB 8.5* 7.9* 8.1*   < > 6.4*   < > 7.0*  --  8.4* 8.0*   MCV  --   --   --   --   --   --  96  --  94 93   PLT  --   --   --   --  250  --  159 267 248 242    < > = values in this interval not displayed.     Most Recent 3 BMP's:Recent Labs   Lab Test 03/21/23  1223 03/21/23  0842 03/20/23  2137 03/20/23  0755 03/20/23  0521 03/19/23  0836 03/19/23  0819 03/18/23  0720 03/18/23  0500 03/12/23  0829 03/12/23  0712 03/11/23  0805 03/11/23  0513 03/10/23  0809 03/10/23  0808   NA  --   --   --   --   --   --   --   --   --   --  140  --  137  --  135*   POTASSIUM  --   --   --   --  3.8  --  3.9  --  4.0   < > 4.1  --  4.4  --  4.3   CHLORIDE  --   --   --   --   --   --   --   --   --   --  105  --  107  --  109*   CO2  --   --   --   --   --   --   --   --   --   --  26  --  25  --  19*   BUN  --   --   --   --   --   --   --   --   --   --  13.3  --  13.4  --  7.4*   CR  --   --   --   --   --   --  0.87  --   --   --  0.68  --  0.57  --  " 0.62   ANIONGAP  --   --   --   --   --   --   --   --   --   --  9  --  5*  --  7   OLAF  --   --   --   --   --   --   --   --   --   --  7.3*  --  7.6*  --  7.3*   * 112* 143*   < >  --    < >  --    < >  --    < > 314*   < > 401*   < > 292*    < > = values in this interval not displayed.     Most Recent 2 LFT's:Recent Labs   Lab Test 03/13/23  0620 03/03/23  0645   AST 60* 40*   ALT 32 14   ALKPHOS 248* 160*   BILITOTAL 0.2 0.3       ASSESSMENT/PLAN:  Severe sepsis, resolved,  Infected stage IV sacral and right greater trochanter decubitus ulcers with sacral osteomyelitis, s/p debridement on March 17, 2023,  Bilateral heel pressure ulcers, unstageable, present on admission.  She was treated with IV Zosyn in the hospital and discharged on oral Augmentin to continue for 4 weeks from March 17, per ID recommendations.  Stable and afebrile.  Patient is hemodynamically stable.  Today she complains of moderate pain in her bottom.  Plan:  Continue Augmentin 875-125 mg every 12 hours through April 13, 2023  Continue pain management with acetaminophen 650 mg every 6 hours PRN and oxycodone 2.5 mg every 4 hours PRN  Continue DVT prophylaxis with enoxaparin 30 mg subcu daily until patient is ambulatory  Continue wound care/wound VACs change per instructions  Follow-up with surgery as directed     CAD, s/p stent to RCA in 2009,  History of CVA,  Essential hypertension,  Dyslipidemia.  Stable.  Blood pressure is running soft.  Plan:  Decrease metoprolol from 50 mg twice daily to 37.5 mg twice daily  Continue 81 mg daily  Continue atorvastatin 80 mg daily  PRN NTG SL  Monitor blood pressure and cardiac status     DM type II,  Diabetic neuropathy.  Last hemoglobin A1c was 7.6% in January 2023.  PTA liraglutide was discontinued in the hospital.  PTA insulin glargine was reduced to 10 units subcu twice daily on March 23 and again to 8 units subcu twice daily on March 27.  Blood glucose levels are in the range of 88-  139.  Plan:  Continue metformin  mg twice daily  Continue insulin glargine 8 units subcu twice daily; hold for blood glucose <120  Continue sliding-scale NovoLog  Monitor blood glucose    Acute blood loss anemia,  Chronic anemia  Baseline hemoglobin 10-11.  Patient received  total of 3 units of PRBC in the hospital for hemoglobin <7.  Last hemoglobin was 8.5 on March 21.  Plan:  Monitor hemoglobin periodically (next CBC on April 3)  Transfuse PRN for hemoglobin less than 7     Hypomagnesemia.  Last magnesium level of 1.5 on March 20; started on magnesium oxide 400 mg 3 times daily for 3 days from March 23 through March 26.  Plan:  Magnesium level on April 3     CKD stage II.  Baseline creatinine 0.6-0.9.  Last creatinine was 0.87 on March 19.  Plan:  Avoid NSAIDs and nephrotoxins  Monitor renal function periodically  Repeat BMP on April 3     GERD.  Plan:  Continue pantoprazole 40 mg daily     History of hepatitis B.  Plan:  Follow-up as outpatient     Glaucoma.  Plan:  Continue PTA Xalatan and Cosopt ophthalmic drops     Depression,  Insomnia.  Patient looks depressed.  Plan:  Continue mirtazapine 15 mg at bedtime  Continue duloxetine 90 mg daily  Continue trazodone 50 mg at bedtime  Continue melatonin 3 mg at bedtime  Monitor symptoms  Refer to ACP     Cognitive impairment.  SLUMS score 16/30.  On today's examination, she is oriented almost x3.  Plan:  Standard delirium precautions  Formal cognitive evaluation per OT for safe discharge planning     Moderate malnutrition,  Failure to thrive.  Patient received tube feeding through NG tube in the hospital.  She no longer has NG tube.  Oral intake is picking up.  Plan:  Continue dietary supplements  Facility dietitian is following the patient     Slow transit constipation.  Plan:  Continue the bowel regimen     Physical deconditioning.  Patient requires Vernell lift for transfers.  Plan:  Continue PT/OT evaluation and therapy         Orders written by provider at  facility:  Decrease metoprolol to 37.5 mg twice daily, hold for SBP less than 100 or heart rate less than 55, DX: Hypertension  BMP and magnesium level on April 3, DX: CKD, hypomagnesemia  ACP referral for depression        Disclaimer: This note may contain text created using speech-recognition software and may contain unintended word substitutions.      Electronically signed by  Jaja Vail MD

## 2023-03-29 NOTE — PROGRESS NOTES
CC attended care conference for member on 3/28/2023 via Team at [facility].   Present at care conference member, this care coordinator, adult daughter (via phone Valerie), NH  (Jesica), NH RN (Jennifer) and NH Therapy (Heber).  OT Report: Slums 16/30   PT Report:  Cristal continues to be transferred by ledy lift. The goal is to maintain strengthening. She continues to need assistance with getting dressed and Toileting.  When she returns to LTC would recommend functional maintenance.    Nursing Report: nursing continues to manage wounds on heels, discussed wound management coming to LTC. Once facility is determined will be able to find wound care options in or outside facility.    Dietician Report: Cristal is eating more and does not have NG any longer  Social Work Report: Continues to be full code, Valerie is HCA, Cristal expressed she would like to go back to [place] to be closer to her daughter. It was expressed they could transfer daughter to [facility] as an option.  It was expressed by family they did not want her to go back to [place] due to lack of wound management.  SW will reach out to [place] to see if they will take her back.  Unfortunately, Cristal's daughter Alxeandra will not be allowed to involved in her care a there has been issues with staff in the past.    CC Report:  Offered support once LTC facility secured.       Lois Loera RN  Care Coordinator-Long Term Care  [address] [address]  [email or web address]   [email or web address]     Office: [phone]   Fax: [phone]

## 2023-03-30 NOTE — PATIENT INSTRUCTIONS
Orders for Cristal Preciado (1950), MR# [id number]:    1) Decrease metoprolol to 37.5 mg twice daily, hold for SBP less than 100 or heart rate less than 55, DX: Hypertension  2) BMP and magnesium level on April 3, DX: CKD, hypomagnesemia  3) ACP referral for depression      Jaja Vail MD  [facility]

## 2023-03-30 NOTE — PROGRESS NOTES
"Received a message that patient's daughter Court was calling to find out what facility patient discharged to. Called Court at [phone] to inform her that patient discharged to [facility]. Noted that Court's sister was aware but Court then stated that \"Valerie doesn't talk to the rest of us\".  Court thanked writer for the information, call was ended.    Libertad Worrell RN  "

## 2023-04-04 NOTE — PROGRESS NOTES
Orders  Cristal Preciado  1950  1) Discontinue current oxycodone orders.  2) Administer oxycodone 5 mg daily PRN 30 minutes prior to wound vac change for pain. Nursing to monitor patient for tolerance and update provider if overly sedated or other concerns  3) Continue oxycodone 2.5 mg q4h for pain. Do not administer within 4 hours of 5 mg dose for wound vac change.  LAMONT Craig CNP on 4/4/2023 at 9:00 AM

## 2023-04-05 NOTE — PROGRESS NOTES
[facility] GERIATRICS    Chief Complaint   Patient presents with     Nursing Home Acute     HPI:  Cristal Preciado is a 73 year old  (1950), who is being seen today for an episodic care visit at: [facility]  (U) [[id number]].     By chart review, patient was hospitalized at [facility] 2/16-2/21/23 for sepsis secondary to stage IV sacral and right greater trochanter decubitus ulcers with sacral osteomyelitis. In ED, workup remarkable for sodium 132, Cr 0.92, WBC 14.1, hgb 10-No results found for: CDAUT, , AST 54 and ALT 36,  and LA 6.4. UA was abnromal with LE and many bacteria. CT demonstrated new sacral decubitus ulcer with subcutaeenous emphysema, new right trochater decubitus and cystitis. UC grew mixed brittani. Sacral wound tested positive for E coli, protus, enterococcus faecalis and corynebacterium. Right hip culutre grew E coli, proteus, enterococcus faecalis and bacteriodes. She was treated with Zosyn, surigcal service consulted. She underwent debridement of both ulcers 3/17/23. Postoperatively her hemoglobin dropped to 5.7 and she received 2 U PRBC, and agin 3/12 for hgb 6.4. She was also seen by GI, palliative, and RD for poor intakes, malnutrition and FTT. GI did not recommend EGD. She did receive TF briefly while inpatient. She was seen by thearpies who recommended TCU at discharge. She was discharged on oral agumentin x 4 weeks per ID. She was discharged to current facility for ongoing medical management, wound care, and rehab.    Today's concern is: Seen today in her room in TCU resting abed on her left side. She is denying concerns, history limited by dementia. She is without pain currently, worse with bed mobility and wound changes. She has wound vacs to both decubitus ulcers. She is sleeping well. She reports poor appetite. She is denying CP, SOB, changes in b/b habits, fever/chills. Nursing report increased pain after dressing changes, having some difficulty with  "vac seal today, otherwise no acute concerns.    Allergies, and PMH/PSH reviewed in EPIC today.  REVIEW OF SYSTEMS:  Limited secondary to cognitive impairment but today pt reports the above    Objective:   /76   Pulse 83   Temp 97.3  F (36.3  C)   Resp 18   Ht 1.702 m (5' 7\")   Wt 67.3 kg (148 lb 4.8 oz)   SpO2 99%   BMI 23.23 kg/m    GENERAL APPEARANCE:  Alert, in no distress, frail appearing  RESP:  respiratory effort and palpation of chest normal, lungs clear to auscultation , no respiratory distress  CV:  Palpation and auscultation of heart done , regular rate and rhythm, no murmur, rub, or gallop, no edema  ABDOMEN:  normal bowel sounds, soft, nontender, no hepatosplenomegaly or other masses  M/S:   Gait and station abnormal transfers with assist, wheelchair for mobility  SKIN:  Sacral and right trochanteric decubitus ulcers, wound vac in place, serosanguinous drainage in both canisters, no signs of periwound infection.  NEURO:   Cranial nerves 2-12 are normal tested and grossly at patient's baseline, davison, follows simple commands  PSYCH:  insight and judgement impaired, memory impaired , flat affect    Labs done in SNF are in [facility]. Please refer to them using [employer] Everywhere. and Recent labs in [facility] reviewed by me today.     Assessment/Plan:  (L89.94,  L08.9) Infected decubitus ulcer, stage IV (H)  (primary encounter diagnosis)  (L89.619,  L89.629) Pressure injury of skin of both heels  Comment: chronic, treated with IV Zosyn and discharged on Augmentin through 4/13. Hemodynamically stable. Increased pain with dressing changes, will increased PRN oxy from 2.5 mg Q4>5 mg Q6. Add narcan.  -management reviewed with nursing facility staff today  Plan: Oyxcodone 5 mg prior to dressing changes and 5 mg Q6H PRN. Continue tylenol. DVT prophy with lovenox 30 mg daily until more mobile. Continue air mattress, offloading and repositioning per nursing. Follow-up with surgery per recommendations. " Wound MD following, wound vac and dressing changes per her recommendations. Narcan PRN for sedation.    (I25.10) Coronary artery disease involving native coronary artery of native heart without angina pectoris  (Z86.73) History of CVA (cerebrovascular accident)  (I10) Essential hypertension  (E78.5) Dyslipidemia  Comment: Chronic. SBP 90s intermittently. Metoprolol decreased 3/30, 4/10  BP Readings from Last 5 Encounters:   04/05/23 122/76   03/30/23 111/70   03/27/23 134/85   Plan: continue metoprolol 25 mg bid, asa 81 mg daily, statin, ntg PRN, monitor BP, cardiac status.    (E11.40,  Z79.4) Type 2 diabetes mellitus with diabetic neuropathy, with long-term current use of insulin (H)  Comment: chronic, liraglutide stopped inpatient. Glardine reduced in TCU x 2, most recently 4/10  Lab Results   Component Value Date    A1C 7.6 01/03/2023    A1C 6.9 08/17/2022    A1C 8.1 06/17/2021   Plan: continue metformin  mg daily, glargine 6 units daily, sliding scale novolog, monitor BG ACHS    (D64.9) Chronic anemia  (D62) Acute blood loss anemia  Comment: acute on chronic anemia, received 3 U PRBC inpatient for hgb <7  Hemoglobin   Date Value Ref Range Status   04/04/2023 10.2 (L) 11.7 - 15.7 g/dL Final   08/06/2019 9.3 (L) 11.7 - 15.7 g/dL Final   07/23/2019 10.0 (L) 11.7 - 15.7 g/dL Final   Plan: monitor hgb perioidically, transfuse for hgb <7.     (E83.42) Hypomagnesemia  Comment: treated with mag inpatient  Plan: recheck as previously ordered 4/11    (N18.2) CKD (chronic kidney disease) stage 2, GFR 60-89 ml/min  Comment: chronic, baselien Cr 0.6-0.9  Creatinine   Date Value Ref Range Status   04/04/2023 0.97 (H) 0.51 - 0.95 mg/dL Final   08/06/2019 0.98 0.52 - 1.04 mg/dL Final   Plan: Avoid nephrotoxins. Renally dose medications as appropriate. Monitor BMP periodically, recheck 4/7 as previously ordered.    (K21.9) Gastroesophageal reflux disease, unspecified whether esophagitis present  Comment: chronic  Plan:  PPI    (Z86.19) History of hepatitis B  Comment: by history  Plan: follow-up outpatient    (H40.003) Glaucoma suspect, bilateral  Comment: Chronic  Plan: PTA Xalatan, cosopt drops    (F32.A) Depression, unspecified depression type  (G47.00) Insomnia, unspecified type  Comment: Chronic, flat affect.  Plan: continue Remeron, duloxetine, trazodone, melatonin. Monitor mood, behaviors. ACP consulted.     (E44.0) Moderate malnutrition (H)  Comment: chronic, with poor intakes. RD following.  Plan: continue supplements per RD, monitor intakes, offer food preferences    (R62.7) Adult failure to thrive  (R41.89) Cognitive impairment  Comment: chronic, has been in [facility] since 2011 and wheelchair dependent. Palliative consulted inpatient for goals of care. [facility] 16/30 in March 2023 Patient's decision making capacity is unreliable, she designated her daughters Valerie and Court as decision maker. Goals are restorative for now.  Plan: SNF for assist with ADLs, medication management, meals, activities    (K59.01) Slow transit constipation  Comment: chronic  Plan: monitor bowel habits, continue bowel regimen.    (R53.81) Physical deconditioning  Comment: chronic, related to acute and chronic conditions as above and recent hospitlaizatoin.   Plan: PT/OT, SNF for assist with transfers, ADLs    MED REC REQUIRED  Post Medication Reconciliation Status:  Discharge medications reconciled, continue medications without change    Orders:  Increase oxycodone to 5 mg Q6H PRN  Schedule oxycodone 5 mg twice weekly with wound vac changes    Electronically signed by: LAMONT Ramírez CNP

## 2023-04-07 NOTE — PROGRESS NOTES
[facility] GERIATRIC SERVICES  April 10, 2023      CHIEF COMPLAINT:  Episodic visit    HPI:    Cristal Preciado is a 73 year old  (1950), who is being seen today for an episodic care visit at [facility].     Medical history is notable for cognitive impairment, CAD, hypertension, dyslipidemia, DM type II, diabetic neuropathy, CVA, CKD, chronic anemia, GERD, gout, lower extremity edema, UTI, hepatitis B, endometrial hyperplasia, allergic rhinitis, glaucoma, depression, COVID-19 infection, and pressure injury of left buttock and right hip.     Summary of hospital course:  Patient was hospitalized at [facility] from February 16 through March 21, 2023 for severe sepsis secondary to infected stage IV sacral and right greater trochanter decubitus ulcers with underlying sacral osteomyelitis.  Initial work-up was significant for sodium of 132, creatinine of 0.93, white count of 14.1, hemoglobin of 10.5, alkaline phosphatase of 318, AST of 54, ALT of 36, CRP of 337.7, and lactic acid of 6.4.  UA was abnormal showing cloudy urine, 31 WBCs, 29 RBCs, large leukocyte esterase, and many bacteria.  CT pelvis was remarkable for new sacral decubitus ulcer with subcutaneous emphysema extending into the surrounding soft tissues, new right greater trochanter decubitus ulcer, and cystitis.  Urine culture grew mixed urogenital brittani.  Sacral wound culture was positive for E. coli, Proteus mirabilis, Enterococcus faecalis, normal brittani, and Corynebacterium striatum.  Right hip wound culture yielded E. coli, Proteus mirabilis, Enterococcus faecalis, and Bacteroides.  She was started on IV Zosyn.  She was seen by surgical service and underwent debridement of sacral and right hip ulcers on March 17, 2023.  Postop hemoglobin dropped to 5.7 and she was transfused with 2 units of PRBC.  She also received another blood transfusion on March 12 for hemoglobin of 6.4.  Multiple services were consulted inpatient  including GI, palliative care, and nutrition due to poor oral intake, malnutrition, and FTT.  GI did not recommend EGD.  WOC RN was consulted for wound care.  It seems that  patient received tube feeding temporarily in the hospital.  TCU was recommended per therapies.  She was discharged on oral Augmentin for 4 weeks from March 17, per ID recommendations. She was then admitted to this facility for medical management, nursing care, and rehab.      Today's visit:  Patient was seen in her room, while lying in bed.  She appears weak and frail but in no acute distress.  She complains of pain in her bottom.  She had a bowel movement this morning.  She denies fever, chills, chest pain, palpitation, dyspnea, nausea, vomiting, abdominal pain, or urinary symptoms.  She requires Vernell lift and assist for transfers.  Her blood pressure is running soft this morning.    CODE STATUS:   CPR/Full code     PAST MEDICAL HISTORY:   Severe sepsis secondary to infected stage IV sacral and right greater trochanter decubitus ulcers with underlying sacral osteomyelitis, s/p debridement on March 17, 2023  Bilateral heel pressure ulcers, unstageable  CAD, s/p stent to RCA in 2009  Hypertension  Dyslipidemia  DM type II  Diabetic neuropathy  CVA  CKD stage II; baseline creatinine 0.6-0.9  Chronic anemia, baseline hemoglobin 10-11  GERD  Gout  Lower extremity edema  UTI  Hepatitis B  Atypical endometrial hyperplasia, s/p robotic assisted total hysterectomy and bilateral salpingo-oophorectomy in August 2019  Allergic rhinitis  Glaucoma  Depression  COVID-19 infection in December 2022  Moderate malnutrition  Failure to thrive   Cognitive impairment (SLUMS score 16/30 in March 2023)      Past Surgical, Family, and Social History were reviewed and updated in [facility].    Current Outpatient Medications   Medication Sig Dispense Refill     acetaminophen (TYLENOL) 325 MG/10.15ML solution 20.3 mLs (650 mg) by Oral or G tube route every 6 hours as needed  for mild pain or other (and adjunct with moderate or severe pain or per patient request)       amoxicillin-clavulanate (AUGMENTIN) 875-125 MG tablet Take 1 tablet by mouth every 12 hours (Patient taking differently: Take 1 tablet by mouth every 12 hours Through April 13, 2023)       aspirin (ASPIRIN LOW DOSE) 81 MG chewable tablet 1 tablet (81 mg) by Oral or NG Tube route daily       atorvastatin (LIPITOR) 80 MG tablet 1 tablet (80 mg) by Oral or NG Tube route every evening       diclofenac (VOLTAREN) 1 % topical gel Apply 2 g topically 4 times daily       diphenhydrAMINE (BENADRYL) 25 MG capsule Take 1 capsule (25 mg) by mouth every 6 hours as needed for itching       dorzolamide-timolol (COSOPT) 2-0.5 % ophthalmic solution Place 1 drop into both eyes 2 times daily 10 mL 3     DULoxetine (CYMBALTA) 30 MG capsule 3 capsules (90 mg) by Oral or NG Tube route every morning       enoxaparin ANTICOAGULANT (LOVENOX) 30 MG/0.3ML syringe Inject 0.3 mLs (30 mg) Subcutaneous every 24 hours       insulin aspart (NOVOLOG PEN) 100 UNIT/ML pen Inject 1-7 Units Subcutaneous At Bedtime 15 mL      insulin aspart (NOVOLOG PEN) 100 UNIT/ML pen Inject 1-10 Units Subcutaneous 3 times daily (before meals) (Patient taking differently: Inject 2-12 Units Subcutaneous 3 times daily as needed 1) Sliding scale  Novolog TID before meals:  BS <200 give               0 units  -249                 2 units  -299                 4 units  -349                 6 units  -399                 8 units  -450                 10 units  BS>450                       12 units) 15 mL      insulin glargine (LANTUS VIAL) 100 UNIT/ML vial Inject 8 Units Subcutaneous 2 times daily Hold for blood glucose less than 120       latanoprost (XALATAN) 0.005 % ophthalmic solution Place 1 drop into both eyes At Bedtime 5 mL 2     melatonin 1 MG TABS tablet Take 1 tablet (1 mg) by mouth nightly as needed for sleep       melatonin 3 MG tablet Take  3 mg by mouth At Bedtime       metFORMIN (GLUCOPHAGE-XR) 750 MG 24 hr tablet Take 1 tablet (750 mg) by mouth 2 times daily (with meals)       methocarbamol (ROBAXIN) 500 MG tablet Take 1 tablet (500 mg) by mouth 4 times daily as needed for muscle spasms       metoprolol tartrate (LOPRESSOR) 25 MG tablet Take 37.5 mg by mouth 2 times daily Hold for SBP less than 100 or HR less than 55       mirtazapine (REMERON) 15 MG tablet 1 tablet (15 mg) by Oral or NG Tube route At Bedtime       multivitamin w/minerals (THERA-VIT-M) tablet 1 tablet by Oral or Feeding Tube route daily       nitroGLYCERIN (NITROSTAT) 0.4 MG SL tablet Place 0.4 mg under the tongue every 5 minutes as needed.       Nutritional Supplements (KIMMIE) PACK Take 1 packet by mouth 2 times daily (with meals)       olopatadine (PATANOL) 0.1 % ophthalmic solution daily       oxyCODONE (ROXICODONE) 5 MG tablet Take 1 tablet (5 mg) by mouth twice a week. May also take 1 tablet (5 mg) every 6 hours as needed for pain. 20 tablet 0     oxyCODONE (ROXICODONE) 5 MG tablet May take 1 tablet (5 mg) by mouth daily as needed for moderate to severe pain (if pain not managed with non-pharmacological and non-opioid interventions). May also take 0.5 tablets (2.5 mg) every 4 hours as needed for moderate to severe pain (if pain not managed with non-pharmacological and non-opioid interventions). 20 tablet 0     pantoprazole (PROTONIX) 40 MG EC tablet Take 1 tablet (40 mg) by mouth every morning (before breakfast)       polyvinyl alcohol (LIQUIFILM TEARS) 1.4 % ophthalmic solution Place 1 drop into both eyes 3 times daily       senna-docusate (SENOKOT-S/PERICOLACE) 8.6-50 MG tablet Take 2 tablets by mouth 2 times daily as needed for constipation 60 tablet 0     simethicone (MYLICON) 80 MG chewable tablet 1 tablet (80 mg) by Oral or NG Tube route 4 times daily as needed       traZODone (DESYREL) 50 MG tablet 1 tablet (50 mg) by Oral or NG Tube route At Bedtime         MED REC  REQUIRED  Post Medication Reconciliation Status: Medications reconciled and changed as below.      ALLERGIES: Dust mites, Other environmental allergy, Pollen extract, and Walnuts [nuts]    REVIEW OF SYSTEMS:  10 point ROS were negative other than the symptoms noted above in the HPI.    PHYSICAL EXAM:  Vital signs were reviewed in the chart.  Vital Signs: Blood pressure 128/58, heart rate 59, respiratory rate 18, temperature 97.6  F, oxygen saturation 100%, weight 135 LBS  General: Weak and frail appearing but comfortable and in no acute distress  HEENT: Conjunctival pallor, no scleral icterus or injection, moist oral mucosa  Cardiovascular: Normal S1, S2, RRR  Respiratory: Lungs clear to auscultation bilaterally  GI: Abdomen soft, non-tender, non-distended, +BS  Extremities: No LE edema  Neuro: CX II-XII grossly intact; ROM in all four extremities grossly intact  Psych: Alert and oriented almost x3; depressed affect  Skin: No acute rash, wound VACs are in place    LABORATORY/IMAGING DATA:  All relevant labs and imaging data in [facility] and/or Care Everywhere were personally reviewed today.      Most Recent 3 CBC's:  Recent Labs   Lab Test 04/10/23  0909 04/04/23  0700 04/03/23  1321   WBC 9.3 8.0 8.1   HGB 10.4* 10.2* 10.5*   MCV 90 90 89    347 345     Most Recent 3 BMP's:Recent Labs   Lab Test 04/07/23  0700 04/04/23  0700 04/03/23  1321   * 135* 137   POTASSIUM 4.7 4.6 4.5   CHLORIDE 104 103 105   CO2 20* 20* 21*   BUN 25.9* 19.5 17.2   CR 0.87 0.97* 0.75   ANIONGAP 11 12 11   OLAF 8.7* 9.0 9.0   * 103* 172*       ASSESSMENT/PLAN:  Infected stage IV sacral and right greater trochanter decubitus ulcers with sacral osteomyelitis, s/p debridement on March 17, 2023,  Bilateral heel pressure ulcers, unstageable, present on admission.  She was treated with IV Zosyn in the hospital and discharged on oral Augmentin to continue for 4 weeks from March 17, per ID recommendations.  Stable and  afebrile.  Patient is hemodynamically stable.  She continues to have pain in her bottom.  She is scheduled for wound VAC change later today.  Plan:  Continue Augmentin 875-125 mg every 12 hours through April 13, 2023  Continue pain management with PRN acetaminophen and oxycodone as ordered   Continue DVT prophylaxis with enoxaparin 30 mg subcu daily until patient is ambulatory  Continue wound care/wound VACs change per instructions   Continue air mattress  Follow-up with surgery as directed     CAD, s/p stent to RCA in 2009,  History of CVA,  Essential hypertension,  Dyslipidemia.  Metoprolol dose was reduced on March 30 due to soft blood pressures.  Blood pressure is running soft this morning.  Plan:  Reduce metoprolol to 25 mg twice daily  Continue 81 mg daily  Continue atorvastatin 80 mg daily  PRN NTG SL  Monitor blood pressure and cardiac status     DM type II,  Diabetic neuropathy.  Last hemoglobin A1c was 7.6% in January 2023.  PTA liraglutide was discontinued in the hospital.  PTA insulin glargine was reduced in TCU.  Blood glucose levels are in the range of  .  Plan:  Continue metformin  mg twice daily  Further reduce insulin glargine  to 6 units subcu twice daily; hold for blood glucose <120  Continue sliding-scale NovoLog  Monitor blood glucose before meals     Acute blood loss anemia,  Chronic anemia  Baseline hemoglobin 10-11.  Patient received  total of 3 units of PRBC in the hospital for hemoglobin <7.  Last hemoglobin was stable at 10.4 on April 10.  Plan:  Monitor hemoglobin periodically   Transfuse PRN for hemoglobin less than 7     Hypomagnesemia.  Treated with oral magnesium through April 8.  Last magnesium level of 1.7 on April 4.  Plan:  Recheck magnesium level on April 11     CKD stage II.  Baseline creatinine 0.6-0.9.  Last creatinine was 0.87 on April 7.  Plan:  Avoid NSAIDs and nephrotoxins  Monitor renal function periodically     GERD.  Plan:  Continue pantoprazole 40 mg  daily     History of hepatitis B.  Plan:  Follow-up as outpatient     Glaucoma.  Plan:  Continue PTA Xalatan and Cosopt ophthalmic drops     Depression,  Insomnia.  Patient has depressed affect.  Plan:  Continue mirtazapine 15 mg at bedtime  Continue duloxetine 90 mg daily  Continue trazodone 50 mg at bedtime  Continue melatonin 3 mg at bedtime  Monitor symptoms  ACP referral order last visit     Cognitive impairment.  SLUMS score 16/30 this stay.  On today's examination, she is oriented almost x3.  Plan:  Standard delirium precautions  Ongoing cognitive evaluation per OT for safe discharge planning     Moderate malnutrition,  Failure to thrive.  Patient received tube feeding through NG tube in the hospital.  She no longer has NG tube.  Plan:  Continue dietary supplements  Facility dietitian is following the patient     Slow transit constipation.  Plan:  Continue the bowel regimen     Physical deconditioning.  Patient requires Vernell lift for transfers.  Plan:  Continue PT/OT evaluation and therapy          Orders written by provider at facility:  Magnesium level on April 11, DX: Hypomagnesemia  Reduce metoprolol to 25 mg twice daily, hold for SBP less than 100 or heart rate less than 55  Reduce insulin glargine  to 6 units subcu twice daily; hold for blood glucose <120  BMP on April 17, DX: CKD  CBC on April 17; DX: Anemia      Recommendation by provider at facility:  Weekly CBC while patient stays on enoxaparin          Disclaimer: This note may contain text created using speech-recognition software and may contain unintended word substitutions.      Electronically signed by  Jaja Vail MD

## 2023-04-10 NOTE — PATIENT INSTRUCTIONS
Orders for Cristal Preciado (1950), MR# [id number]:    1) Magnesium level on April 11, DX: Hypomagnesemia  2) Reduce metoprolol to 25 mg twice daily, hold for SBP less than 100 or heart rate less than 55  3) Reduce insulin glargine  to 6 units subcu twice daily; hold for blood glucose <120  4) BMP on April 17, DX: CKD  5) CBC on April 17; DX: Anemia      Jaja Vail MD  [facility]

## 2023-04-14 NOTE — PROGRESS NOTES
[facility] GERIATRICS    Chief Complaint   Patient presents with     RECHECK     HPI:  Cristal Preciado is a 73 year old  (1950), who is being seen today for an episodic care visit at: [facility]  (U) [[id number]].     By chart review, patient was hospitalized at [facility] 2/16-2/21/23 for sepsis secondary to stage IV sacral and right greater trochanter decubitus ulcers with sacral osteomyelitis. In ED, workup remarkable for sodium 132, Cr 0.92, WBC 14.1, hgb 10-No results found for: CDAUT, , AST 54 and ALT 36,  and LA 6.4. UA was abnromal with LE and many bacteria. CT demonstrated new sacral decubitus ulcer with subcutaeenous emphysema, new right trochater decubitus and cystitis. UC grew mixed brittani. Sacral wound tested positive for E coli, protus, enterococcus faecalis and corynebacterium. Right hip culutre grew E coli, proteus, enterococcus faecalis and bacteriodes. She was treated with Zosyn, surigcal service consulted. She underwent debridement of both ulcers 3/17/23. Postoperatively her hemoglobin dropped to 5.7 and she received 2 U PRBC, and agin 3/12 for hgb 6.4. She was also seen by GI, palliative, and RD for poor intakes, malnutrition and FTT. GI did not recommend EGD. She did receive TF briefly while inpatient. She was seen by thearpies who recommended TCU at discharge. She was discharged on oral agumentin x 4 weeks per ID. She was discharged to current facility for ongoing medical management, wound care, and rehab.     Today's concern is: Seen today for routine follow-up in TCU. Cornejo placed earlier this week and wound care provider's recommendation, having difficulty controlling moisture and maintaining wound vac dressing integrity. Diet changed to minced/moist due to poor intakes and poor dentition. Seen lying in bed, she is calling out, notes sacral pain and requesting assist to be repositioning onto her left side. Uses grab par to reposition herself slightly.  She  "reports she slept well. She has a poor appetite. She is otherwise doing \"ok,\" does not offer additional history. She is denying CP, SOB, changes in b/b habits. Nursing reporting weight loss ~10 lbs since admission.    Allergies, and PMH/PSH reviewed in EPIC today.  REVIEW OF SYSTEMS:  Limited secondary to cognitive impairment but today pt reports the above and 4 point ROS including Respiratory, CV, GI and , other than that noted in the HPI,  is negative    Objective:   BP 94/52   Pulse 74   Temp 97.3  F (36.3  C)   Resp 16   Ht 1.702 m (5' 7\")   Wt 56.8 kg (125 lb 4.8 oz)   SpO2 100%   BMI 19.62 kg/m    GENERAL APPEARANCE:  Alert, in mild acute distress due to discomfort, relieved by repositioning  RESP:  respiratory effort and palpation of chest normal, lungs clear to auscultation , no respiratory distress  CV:  Palpation and auscultation of heart done , regular rate and rhythm, no murmur, rub, or gallop, no edema  ABDOMEN:  normal bowel sounds, soft, nontender, no hepatosplenomegaly or other masses  M/S:   Gait and station abnormal transfers with assist, 2 for bed mobility, ledy lift  SKIN:  Inspection of skin and subcutaneous tissue baseline, Palpation of skin and subcutaneous tissue baseline, wound vacs to scaral and right IT pressure ulcer, foam boots on BLE  NEURO:   Cranial nerves 2-12 are normal tested and grossly at patient's baseline, generalized weakness  PSYCH:  insight and judgement impaired, memory impaired , flat affect    Labs done in SNF [facility]. Please refer to them using [facility]/Care Everywhere. and Recent labs in [facility] reviewed by me today.     Assessment/Plan:  (L89.94,  L08.9) Infected decubitus ulcer, stage IV (H)  (primary encounter diagnosis)  (L89.619,  L89.629) Pressure injury of skin of both heels  Comment: chronic, treated with IV Zosyn and discharged on Augmentin through 4/13. Afebrile. Increased pain and calling out, will schedule oxycodone daily and continue " PRN.  -management reviewed with nursing facility staff today  -chase placed for moisture/wound management  Plan: Oyxcodone 5 mg daily and 5 mg Q6H PRN. Continue tylenol. DVT prophy with lovenox 30 mg daily until more mobile. Continue air mattress, offloading and repositioning per nursing. Follow-up with surgery per recommendations. Wound MD following, wound vac and dressing changes per her recommendations. Narcan PRN for sedation.     (I25.10) Coronary artery disease involving native coronary artery of native heart without angina pectoris  (Z86.73) History of CVA (cerebrovascular accident)  (I10) Essential hypertension  (E78.5) Dyslipidemia  Comment: Chronic. SBP 90s intermittently. Metoprolol decreased 3/30, 4/10.   -Will decrease to 12.5 mg today again  BP Readings from Last 3 Encounters:   04/14/23 94/52   04/10/23 92/56   04/05/23 122/76   Plan: continue metoprolol 12.5 mg bid, asa 81 mg daily, statin, ntg PRN, monitor BP, cardiac status.     (E11.40,  Z79.4) Type 2 diabetes mellitus with diabetic neuropathy, with long-term current use of insulin (H)  Comment: chronic, liraglutide stopped inpatient. Glardine reduced in TCU x 2, most recently 4/10  -BG 70-170s, 78 again this AM. Will stop Glargine due to risk for hypoglycemia with co morbidities, continue sliding scale         Lab Results   Component Value Date     A1C 7.6 01/03/2023     A1C 6.9 08/17/2022     A1C 8.1 06/17/2021   Plan: continue metformin  mg daily, discontinue glargine, continue sliding scale novolog, monitor BG ACHS     (D64.9) Chronic anemia  (D62) Acute blood loss anemia - resolved  Comment: acute on chronic anemia, received 3 U PRBC inpatient for hgb <7. Will order weekly CBC while on lovenox as above.  Hemoglobin   Date Value Ref Range Status   04/10/2023 10.4 (L) 11.7 - 15.7 g/dL Final   04/04/2023 10.2 (L) 11.7 - 15.7 g/dL Final   08/06/2019 9.3 (L) 11.7 - 15.7 g/dL Final   07/23/2019 10.0 (L) 11.7 - 15.7 g/dL Final   Plan: monitor  CBC weekly, transfuse for hgb <7.      (E83.42) Hypomagnesemia - resolved  Comment: treated with mag inpatient  Magnesium   Date Value Ref Range Status   04/11/2023 2.0 1.7 - 2.3 mg/dL Final   08/06/2019 1.9 1.6 - 2.3 mg/dL Final   Plan: monitor mag PRN     (N18.2) CKD (chronic kidney disease) stage 2, GFR 60-89 ml/min  Comment: chronic, baseline Cr 0.6-0.9  Lab Results   Component Value Date    CR 0.87 04/07/2023    CR 0.98 08/06/2019   Plan: Avoid nephrotoxins. Renally dose medications as appropriate. Monitor BMP periodically.     (K21.9) Gastroesophageal reflux disease, unspecified whether esophagitis present  Comment: chronic  Plan: PPI     (Z86.19) History of hepatitis B  Comment: by history  Plan: follow-up outpatient     (H40.003) Glaucoma suspect, bilateral  Comment: Chronic  Plan: PTA Xalatan, cosopt drops     (F32.A) Depression, unspecified depression type  (G47.00) Insomnia, unspecified type  Comment: Chronic, flat affect.  Plan: continue Remeron, duloxetine, trazodone, melatonin. Monitor mood, behaviors. ACP consulted.      (E44.0) Moderate malnutrition (H)  (R63.4) Weigh tloss  Comment: chronic, with poor intakes. RD following. Weight loss ~10 lbs since in TCU. RD adding supplements, diet changed to minced/moist due to poor dentition.   Plan: continue supplements per RD, modified diet, monitor intakes and weights, offer food preferences     (R62.7) Adult failure to thrive  (R41.89) Cognitive impairment  Comment: chronic, has been in [facility] since 2011 and wheelchair dependent. Palliative consulted inpatient for goals of care. [facility] 16/30 in March 2023 Patient's decision making capacity is unreliable, she designated her daughters Valerie and Court as decision maker.   -with weight loss as above.  -Discussed management with daughter Valerie via phone today, independent historian utilized due to patient's impaired cognition. Valerie reiterated goals are restorative and POLST reviewed without  changes.   Plan: SNF for assist with ADLs, medication management, meals, activities     (K59.01) Slow transit constipation  Comment: chronic  Plan: monitor bowel habits, continue bowel regimen.     (R53.81) Physical deconditioning  Comment: chronic, related to acute and chronic conditions as above and recent hospitlaizatoin.   Plan: PT/OT, SNF for assist with transfers, ADLs     MED REC REQUIRED  Post Medication Reconciliation Status:  Discharge medications reconciled, continue medications without change     Orders:  Increase oxycodone to 5 mg Q6H PRN  Schedule oxycodone 5 mg twice weekly with wound vac changes  Discontinue glargine  Decrease metoprolol to 12.5 mg BID    Total time spent with patient visit was 52 minutes including patient visit, review of past records, phone call to patient contact and review of management with nursing facility staff: Nursing, RD.       Electronically signed by: LAMONT Ramírez CNP

## 2023-04-14 NOTE — PATIENT INSTRUCTIONS
Marco Preciado  1950     Discontinue glargine  Decrease metoprolol to 12.5 mg BID      LAMONT Ramírez CNP on 4/14/2023 at 3:48 PM

## 2023-04-17 NOTE — PROGRESS NOTES
"[facility] GERIATRICS    Chief Complaint   Patient presents with     Nursing Home Acute     HPI:  Cristal Preciado is a 73 year old  (1950), who is being seen today for an episodic care visit at: [facility]  (U) [[id number]].     By chart review, patient was hospitalized at [facility] 2/16-2/21/23 for sepsis secondary to stage IV sacral and right greater trochanter decubitus ulcers with sacral osteomyelitis. In ED, workup remarkable for sodium 132, Cr 0.92, WBC 14.1, hgb 10-No results found for: CDAUT, , AST 54 and ALT 36,  and LA 6.4. UA was abnromal with LE and many bacteria. CT demonstrated new sacral decubitus ulcer with subcutaeenous emphysema, new right trochater decubitus and cystitis. UC grew mixed brittani. Sacral wound tested positive for E coli, protus, enterococcus faecalis and corynebacterium. Right hip culutre grew E coli, proteus, enterococcus faecalis and bacteriodes. She was treated with Zosyn, surigcal service consulted. She underwent debridement of both ulcers 3/17/23. Postoperatively her hemoglobin dropped to 5.7 and she received 2 U PRBC, and agin 3/12 for hgb 6.4. She was also seen by GI, palliative, and RD for poor intakes, malnutrition and FTT. GI did not recommend EGD. She did receive TF briefly while inpatient. She was seen by thearpies who recommended TCU at discharge. She was discharged on oral agumentin x 4 weeks per ID. She was discharged to current facility for ongoing medical management, wound care, and rehab.    Today's concern is: Seen today in her room in TCU resting abed. She is doing \"ok.\" She reports pain in her sacral ulcer 8/10. She reports she slept well but feels tired. She reports she ate breakfast and denies abdominal pain. She is resting with her eyes closed and becomes irritable with questions, yells at one point and then declines to answer additional questions.    Allergies, and PMH/PSH reviewed in EPIC today.  REVIEW OF " "SYSTEMS:  Limited secondary to cognitive impairment but today pt reports the above    Objective:   /62   Pulse 77   Temp 97.5  F (36.4  C)   Resp 16   Ht 1.702 m (5' 7.01\")   Wt 56.8 kg (125 lb 4.8 oz)   SpO2 100%   BMI 19.62 kg/m    GENERAL APPEARANCE:  Alert, in no distress, eyes closed  RESP:  respiratory effort and palpation of chest normal, lungs clear to auscultation , no respiratory distress  CV:  Palpation and auscultation of heart done , regular rate and rhythm, no murmur, rub, or gallop, no edema  ABDOMEN:  normal bowel sounds, soft, nontender, no hepatosplenomegaly or other masses  M/S:   Gait and station abnormal transfers with assist, wheelchair for mobility  SKIN:  Inspection of skin and subcutaneous tissue baseline, Palpation of skin and subcutaneous tissue baseline, wound vacs to sacrum with serous drainage, right IT with serosanguinous drainage. BLE in foam boots.   NEURO:   Cranial nerves 2-12 are normal tested and grossly at patient's baseline, davison with generalized weakness  PSYCH:  insight and judgement impaired, memory impaired , flat affect, irritable    Labs done in SNF are in [place] EPIC. Please refer to them using EPIC/Care Everywhere. and Recent labs in EPIC reviewed by me today.     Assessment/Plan:  (L89.94,  L08.9) Infected decubitus ulcer, stage IV (H)  (primary encounter diagnosis)  (L89.619,  L89.629) Pressure injury of skin of both heels  Comment: chronic, treated with IV Zosyn and discharged on Augmentin through 4/13. Afebrile. Increased pain and calling out, will schedule oxycodone daily and continue PRN.  -chase placed for moisture/wound management  -overall guarded prognosis in terms of wound healing based on co-morbidities and significant debility, malnutrition and poor intakes. Management reviewed with nursing facility staff today. Reasonable goal for wound maintenance, protect remaining skin integrity and prevention of secondary infections.   -remains on lovenox " for DVT prophy, unlikely she will regain ambulatory status and has been significantly immobile, wheelchair bound for many years. No history of DVT/PE> Will stop at six weeks postop.  Plan: Oyxcodone 5 mg daily and 5 mg Q6H PRN. Continue tylenol. DVT prophy with lovenox 30 mg daily x2 additional weeks. Continue air mattress, offloading and repositioning per nursing. Follow-up with surgery per recommendations. Wound MD following, wound vac and dressing changes per her recommendations. Narcan PRN for sedation.     (I25.10) Coronary artery disease involving native coronary artery of native heart without angina pectoris  (Z86.73) History of CVA (cerebrovascular accident)  (I10) Essential hypertension  (E78.5) Dyslipidemia  Comment: Chronic. SBP 90s intermittently. Metoprolol decreased 3/30, 4/10, 4/14.  BP Readings from Last 3 Encounters:   04/17/23 115/62   04/14/23 94/52   04/10/23 92/56   Plan: continue metoprolol 12.5 mg bid, asa 81 mg daily, statin, ntg PRN, monitor BP, cardiac status.     (E11.40,  Z79.4) Type 2 diabetes mellitus with diabetic neuropathy, with long-term current use of insulin (H)  Comment: chronic, liraglutide stopped inpatient. Glardine reduced in TCU then discontinued for BG 70s.            Lab Results   Component Value Date     A1C 7.6 01/03/2023     A1C 6.9 08/17/2022     A1C 8.1 06/17/2021   Plan: continue metformin  mg daily, discontinue glargine, continue sliding scale novolog, monitor BG ACHS     (D64.9) Chronic anemia  (D62) Acute blood loss anemia - resolved  Comment: acute on chronic anemia, received 3 U PRBC inpatient for hgb <7. Weekly CBC while on lovenox as above.  Hemoglobin   Date Value Ref Range Status   04/17/2023 10.4 (L) 11.7 - 15.7 g/dL Final   04/10/2023 10.4 (L) 11.7 - 15.7 g/dL Final   08/06/2019 9.3 (L) 11.7 - 15.7 g/dL Final   07/23/2019 10.0 (L) 11.7 - 15.7 g/dL Final   Plan: monitor CBC weekly, transfuse for hgb <7.      (E83.42) Hypomagnesemia -  resolved  Comment: treated with mag inpatient         Magnesium   Date Value Ref Range Status   04/11/2023 2.0 1.7 - 2.3 mg/dL Final   08/06/2019 1.9 1.6 - 2.3 mg/dL Final   Plan: monitor mag PRN     (N18.2) CKD (chronic kidney disease) stage 2, GFR 60-89 ml/min  Comment: chronic, baseline Cr 0.6-0.9. Looks a bit dry today with hyponatremia 133, elevated Cr (angella 0.7-0.9,) will have nursing push oral fluids recheck later this week.   Lab Results   Component Value Date    CR 1.04 04/17/2023    CR 0.98 08/06/2019   Plan: Avoid nephrotoxins. Renally dose medications as appropriate. Monitor BMP periodically. Offer fluids Q2HWA. Recheck BMP 4/19.      (K21.9) Gastroesophageal reflux disease, unspecified whether esophagitis present  Comment: chronic  Plan: PPI     (Z86.19) History of hepatitis B  Comment: by history  Plan: follow-up outpatient     (H40.003) Glaucoma suspect, bilateral  Comment: Chronic  Plan: PTA Xalatan, cosopt drops     (F32.A) Depression, unspecified depression type  (G47.00) Insomnia, unspecified type  Comment: Chronic, flat affect.  Plan: continue Remeron, duloxetine, trazodone, melatonin. Monitor mood, behaviors. ACP consulted.      (E44.0) Moderate malnutrition (H)  (R63.4) Weight lloss  Comment: chronic, with poor intakes. RD following. Weight loss ~10 lbs since in TCU. RD adding supplements, diet changed to minced/moist due to poor dentition.   Plan: continue supplements per RD, modified diet, monitor intakes and weights, offer food preferences     (R62.7) Adult failure to thrive  (R41.89) Cognitive impairment  Comment: chronic, has been in [facility] since 2011 and wheelchair dependent. More rapid decline following COVID infection in December. Palliative consulted inpatient for goals of care. [facility] 16/30 in March 2023 Patient's decision making capacity is unreliable, she designated her daughters Valerie and Court as decision makers.   -disengaged and irritable today on exam  -Call  placed to daughter Valerie today and unable to leave , message left for daughter Court.   Plan: SNF for assist with ADLs, medication management, meals, activities     (K59.01) Slow transit constipation  Comment: chronic  Plan: monitor bowel habits, continue bowel regimen.    (R79.89) Elevated LFTs  (primary encounter diagnosis)  Comment: Incidental finding, nursing obtained CMP today instead of ordered BMP. LFTs have been elevated chronically, ALP since 2021. She did have small gallstones noted on January CTAP, she is asymptomatic today. Suspect ALP elevation related to osteomyelitis, can follow-up outpatient. Discussed management with MD today.  Plan: Monitor CMP periodically, and for GI symptoms.     (R53.81) Physical deconditioning  Comment: chronic, related to acute and chronic conditions as above and recent hospitlaizatoin.    Plan: PT/OT, SNF for assist with transfers, ADLs    Electronically signed by: LAMONT Ramírez CNP

## 2023-04-17 NOTE — PATIENT INSTRUCTIONS
Orders  Cristal Preciado  1950     Stop lovenox 5/1/23 dx: DVT prophylaxis  Stop weekly CBC after 5/1/23 dx: anemia      LAMONT Ramírez CNP on 4/17/2023 at 1:54 PM

## 2023-04-19 NOTE — PROGRESS NOTES
[facility]  April 20, 2023      CHIEF COMPLAINT:  Episodic visit    HPI:    Cristal Preciado is a 73 year old  (1950), who is being seen today for an episodic care visit at [facility].     Medical history is notable for cognitive impairment, CAD, hypertension, dyslipidemia, DM type II, diabetic neuropathy, CVA, CKD, chronic anemia, GERD, gout, lower extremity edema, UTI, hepatitis B, endometrial hyperplasia, allergic rhinitis, glaucoma, depression, COVID-19 infection, and pressure injury of left buttock and right hip.     Summary of hospital course:  Patient was hospitalized at [facility] from February 16 through March 21, 2023 for severe sepsis secondary to infected stage IV sacral and right greater trochanter decubitus ulcers with underlying sacral osteomyelitis.  Initial work-up was significant for sodium of 132, creatinine of 0.93, white count of 14.1, hemoglobin of 10.5, alkaline phosphatase of 318, AST of 54, ALT of 36, CRP of 337.7, and lactic acid of 6.4.  UA was abnormal showing cloudy urine, 31 WBCs, 29 RBCs, large leukocyte esterase, and many bacteria.  CT pelvis was remarkable for new sacral decubitus ulcer with subcutaneous emphysema extending into the surrounding soft tissues, new right greater trochanter decubitus ulcer, and cystitis.  Urine culture grew mixed urogenital brittani.  Sacral wound culture was positive for E. coli, Proteus mirabilis, Enterococcus faecalis, normal brittani, and Corynebacterium striatum.  Right hip wound culture yielded E. coli, Proteus mirabilis, Enterococcus faecalis, and Bacteroides.  She was started on IV Zosyn.  She was seen by surgical service and underwent debridement of sacral and right hip ulcers on March 17, 2023.  Postop hemoglobin dropped to 5.7 and she was transfused with 2 units of PRBC.  She also received another blood transfusion on March 12 for hemoglobin of 6.4.  Multiple services were consulted inpatient  including GI, palliative care, and nutrition due to poor oral intake, malnutrition, and FTT.  GI did not recommend EGD.  WOC RN was consulted for wound care.  It seems that  patient received tube feeding temporarily in the hospital.  TCU was recommended per therapies.  She was discharged on oral Augmentin for 4 weeks from March 17, per ID recommendations. She was then admitted to this facility for medical management, nursing care, and rehab.       Today's visit:  Patient was seen in her room, while lying in bed.  The wound care team are in the room and changing the wound dressings and wound VAC.  She feels comfortable although she has some discomfort in her bottom.  She denies fever, chills, chest pain, palpitation, dyspnea, nausea, vomiting, abdominal pain, or urinary symptoms.      CODE STATUS:   CPR/Full code     PAST MEDICAL HISTORY:   Severe sepsis secondary to infected stage IV sacral and right greater trochanter decubitus ulcers with underlying sacral osteomyelitis, s/p debridement on March 17, 2023  Bilateral heel pressure ulcers, unstageable  CAD, s/p stent to RCA in 2009  Hypertension  Dyslipidemia  DM type II  Diabetic neuropathy  CVA  CKD stage II; baseline creatinine 0.6-0.9  Chronic anemia, baseline hemoglobin 10-11  GERD  Gout  Lower extremity edema  UTI  Hepatitis B  Atypical endometrial hyperplasia, s/p robotic assisted total hysterectomy and bilateral salpingo-oophorectomy in August 2019  Allergic rhinitis  Glaucoma  Depression  COVID-19 infection in December 2022  Moderate malnutrition  Failure to thrive   Cognitive impairment (SLUMS score 16/30 in March 2023)    Past Surgical, Family, and Social History were reviewed and updated in [facility].    Current Outpatient Medications   Medication Sig Dispense Refill     acetaminophen (TYLENOL) 500 MG tablet Take 1,000 mg by mouth 3 times daily       amoxicillin-clavulanate (AUGMENTIN) 875-125 MG tablet Take 1 tablet by mouth every 12 hours (Patient taking  differently: Take 1 tablet by mouth every 12 hours Through April 13, 2023)       aspirin (ASPIRIN LOW DOSE) 81 MG chewable tablet 1 tablet (81 mg) by Oral or NG Tube route daily       atorvastatin (LIPITOR) 80 MG tablet 1 tablet (80 mg) by Oral or NG Tube route every evening       diclofenac (VOLTAREN) 1 % topical gel Apply 2 g topically 4 times daily       diphenhydrAMINE (BENADRYL) 25 MG capsule Take 1 capsule (25 mg) by mouth every 6 hours as needed for itching       dorzolamide-timolol (COSOPT) 2-0.5 % ophthalmic solution Place 1 drop into both eyes 2 times daily 10 mL 3     DULoxetine (CYMBALTA) 30 MG capsule 3 capsules (90 mg) by Oral or NG Tube route every morning       enoxaparin ANTICOAGULANT (LOVENOX) 30 MG/0.3ML syringe Inject 0.3 mLs (30 mg) Subcutaneous every 24 hours (Patient taking differently: Inject 30 mg Subcutaneous every 24 hours Until 5/1/23)       insulin aspart (NOVOLOG PEN) 100 UNIT/ML pen Inject 1-10 Units Subcutaneous 3 times daily (before meals) (Patient taking differently: Inject 2-12 Units Subcutaneous 3 times daily as needed 1) Sliding scale  Novolog TID before meals:  BS <200 give               0 units  -249                 2 units  -299                 4 units  -349                 6 units  -399                 8 units  -450                 10 units  BS>450                       12 units) 15 mL      latanoprost (XALATAN) 0.005 % ophthalmic solution Place 1 drop into both eyes At Bedtime 5 mL 2     melatonin 1 MG TABS tablet Take 1 tablet (1 mg) by mouth nightly as needed for sleep       melatonin 3 MG tablet Take 3 mg by mouth At Bedtime       metFORMIN (GLUCOPHAGE-XR) 750 MG 24 hr tablet Take 1 tablet (750 mg) by mouth 2 times daily (with meals)       methocarbamol (ROBAXIN) 500 MG tablet Take 1 tablet (500 mg) by mouth 4 times daily as needed for muscle spasms       metoprolol tartrate (LOPRESSOR) 25 MG tablet Take 12.5 mg by mouth 2 times daily Hold  "for SBP less than 100 or HR less than 55       mirtazapine (REMERON) 15 MG tablet 1 tablet (15 mg) by Oral or NG Tube route At Bedtime       multivitamin w/minerals (THERA-VIT-M) tablet 1 tablet by Oral or Feeding Tube route daily       nitroGLYCERIN (NITROSTAT) 0.4 MG SL tablet Place 0.4 mg under the tongue every 5 minutes as needed.       Nutritional Supplements (KIMMIE) PACK Take 1 packet by mouth 2 times daily (with meals)       olopatadine (PATANOL) 0.1 % ophthalmic solution daily       pantoprazole (PROTONIX) 40 MG EC tablet Take 1 tablet (40 mg) by mouth every morning (before breakfast)       polyvinyl alcohol (LIQUIFILM TEARS) 1.4 % ophthalmic solution Place 1 drop into both eyes 3 times daily       senna-docusate (SENOKOT-S/PERICOLACE) 8.6-50 MG tablet Take 2 tablets by mouth 2 times daily as needed for constipation 60 tablet 0     simethicone (MYLICON) 80 MG chewable tablet 1 tablet (80 mg) by Oral or NG Tube route 4 times daily as needed       traZODone (DESYREL) 50 MG tablet 1 tablet (50 mg) by Oral or NG Tube route At Bedtime         MED REC REQUIRED  Post Medication Reconciliation Status: medication reconcilation previously completed during another office visit      ALLERGIES: Dust mites, Other environmental allergy, Pollen extract, and Walnuts [nuts]    REVIEW OF SYSTEMS:  10 point ROS were negative other than the symptoms noted above in the HPI.    PHYSICAL EXAM:  Vital signs were reviewed in the chart.  Vital Signs:  /62   Pulse 88   Temp 97.2  F (36.2  C)   Resp 18   Ht 1.702 m (5' 7\")   Wt 56.8 kg (125 lb 4.8 oz)   SpO2 100%   BMI 19.62 kg/m    General: Comfortable and in no acute distress  HEENT: Conjunctival pallor, no scleral icterus or injection, dry oral mucosa  Cardiovascular: Normal S1, S2, RRR  Respiratory: Lungs clear to auscultation bilaterally  GI: Abdomen soft, non-tender, non-distended, +BS  Extremities: No LE edema  Neuro: CX II-XII grossly intact; ROM in all four " extremities grossly intact  Psych: Alert and oriented almost x3; depressed affect  Skin: 2 large and deep decubitus ulcers noted on sacrum and right greater trochanter area; no evidence for drainage or infection    LABORATORY/IMAGING DATA:  All relevant labs and imaging data in [facility] and/or Care Everywhere were personally reviewed today.      Most Recent 3 CBC's:Recent Labs   Lab Test 04/17/23  0700 04/10/23  0909 04/04/23  0700   WBC 7.2 9.3 8.0   HGB 10.4* 10.4* 10.2*   MCV 90 90 90    395 347     Most Recent 3 BMP's:Recent Labs   Lab Test 04/17/23  0700 04/07/23  0700 04/04/23  0700   * 135* 135*   POTASSIUM 4.5 4.7 4.6   CHLORIDE 102 104 103   CO2 18* 20* 20*   BUN 32.9* 25.9* 19.5   CR 1.04* 0.87 0.97*   ANIONGAP 13 11 12   OLAF 8.7* 8.7* 9.0   * 131* 103*       ASSESSMENT/PLAN:  Infected stage IV sacral and right greater trochanter decubitus ulcers with sacral osteomyelitis, s/p debridement on March 17, 2023,  Bilateral heel pressure ulcers, unstageable, present on admission.  She was treated with IV Zosyn in the hospital and discharged on oral Augmentin to continue for 4 weeks from March 17, per ID recommendations; completed on April 13.  DVT prophylaxis with enoxaparin was discontinued on April 17.  Stable and afebrile.  Some pain in her bottom/hip.  She has 2 wound VACs, changed today, April 20.  Plan:  Continue pain management with PRN acetaminophen and oxycodone as ordered   Continue wound care/wound VACs change per instructions   Continue air mattress  Follow-up with surgery as directed     CAD, s/p stent to RCA in 2009,  History of CVA,  Essential hypertension,  Dyslipidemia.  Metoprolol dose was reduced due to soft blood pressures.  Blood pressure is running soft this morning.  Plan:  Continue metoprolol 12.5 mg twice daily  Continue 81 mg daily  Continue atorvastatin 80 mg daily  PRN NTG SL  Monitor blood pressure and cardiac status     DM type II,  Diabetic neuropathy.  Last  hemoglobin A1c was 7.6% in January 2023.  PTA liraglutide was discontinued in the hospital.  PTA insulin glargine was discontinued on April 14 in TCU.  Blood glucose levels are in the range of 104-207.  Plan:  Continue metformin  mg twice daily  Continue sliding-scale NovoLog  Monitor blood glucose before meals     Acute blood loss anemia,  Chronic anemia  Baseline hemoglobin 10-11.  Patient received  total of 3 units of PRBC in the hospital for hemoglobin <7.  Last hemoglobin was stable at 10.4 on April 17.  Plan:  Monitor hemoglobin periodically   Transfuse PRN for hemoglobin less than 7     ALY,   CKD stage II.  Baseline creatinine 0.6-0.9.  Last creatinine was elevated at 1.04 on April 17.  Suspect prerenal as patient appears dehydrated.  Plan:  Encourage oral hydration  Avoid NSAIDs and nephrotoxins  Recheck BMP on April 21     GERD.  Plan:  Continue pantoprazole 40 mg daily     History of hepatitis B,  Abnormal LFT.  Last LFT on April 17, 2023 with alkaline phosphatase of 306, ALT of 55, AST of 84, and total bilirubin of 0.2.  Elevated alk phos likely from an osseous source due to sacral bone osteomyelitis/debridement.  Plan:  Monitor LFT periodically  Follow-up as outpatient    Hypomagnesemia.  Replaced.  Last magnesium level was normal at 2 on April 11.  Plan:  Monitor magnesium level periodically     Glaucoma.  Plan:  Continue PTA Xalatan and Cosopt ophthalmic drops     Depression,  Insomnia.  Patient has depressed affect.  Plan:  Continue mirtazapine 15 mg at bedtime  Continue duloxetine 90 mg daily  Continue trazodone 50 mg at bedtime  Continue melatonin 3 mg at bedtime  Monitor symptoms  ACP referral order last visit     Cognitive impairment.  SLUMS score 16/30 this stay.  On today's examination, she is oriented almost x3.  Plan:  Standard delirium precautions  Ongoing cognitive evaluation per OT for safe discharge planning     Dysphagia,  Moderate malnutrition,  Failure to thrive.  Patient  received tube feeding through NG tube in the hospital.  She is currently on dysphagia diet level 2 with regular consistency.  Plan:  Continue DDL2  Continue dietary supplements  Continue SLP evaluation and therapy  Facility dietitian is following the patient     Slow transit constipation.  Plan:  Continue the bowel regimen     Physical deconditioning.  Patient requires Vernell lift for transfers.  Plan:  Continue PT/OT evaluation and therapy        Communication: Discussed with RN and wound care team.      Orders written by provider at facility:  Push oral fluids  BMP on April 21, DX: Renal failure          Disclaimer: This note may contain text created using speech-recognition software and may contain unintended word substitutions.      Electronically signed by  Jaja Vail MD

## 2023-04-20 NOTE — PATIENT INSTRUCTIONS
Orders for Cristal Preciado (1950), MR# [id number]:    1) Push oral fluids  2) BMP on April 21, DX: Renal failure    Jaja Vail MD  [facility]

## 2023-04-24 NOTE — PROGRESS NOTES
[facility]    Chief Complaint   Patient presents with     RECHECK     HPI:  Cristal Preciado is a 73 year old  (1950), who is being seen today for an episodic care visit at: [facility]  ([facility]) [[id number]].     By chart review, patient was hospitalized at [facility] 2/16-2/21/23 for sepsis secondary to stage IV sacral and right greater trochanter decubitus ulcers with sacral osteomyelitis. In ED, workup remarkable for sodium 132, Cr 0.92, WBC 14.1, hgb 10-No results found for: CDAUT, , AST 54 and ALT 36,  and LA 6.4. UA was abnromal with LE and many bacteria. CT demonstrated new sacral decubitus ulcer with subcutaeenous emphysema, new right trochater decubitus and cystitis. UC grew mixed brittani. Sacral wound tested positive for E coli, protus, enterococcus faecalis and corynebacterium. Right hip culutre grew E coli, proteus, enterococcus faecalis and bacteriodes. She was treated with Zosyn, surigcal service consulted. She underwent debridement of both ulcers 3/17/23. Postoperatively her hemoglobin dropped to 5.7 and she received 2 U PRBC, and agin 3/12 for hgb 6.4. She was also seen by GI, palliative, and RD for poor intakes, malnutrition and FTT. GI did not recommend EGD. She did receive TF briefly while inpatient. She was seen by thearpies who recommended TCU at discharge. She was discharged on oral agumentin x 4 weeks per ID. She was discharged to current facility for ongoing medical management, wound care, and rehab.    Today's concern is: Seen today for routine follow-up in her room, resting abed with eyes closed. She opens her eyes and responds to questions with 1-2 word answers. She is denying acute concerns. She reports she ate a good breakfast. She is denying CP, SOB, changes in b/b habits, fever/chills.    Allergies, and PMH/PSH reviewed in EPIC today.  REVIEW OF SYSTEMS:  Limited secondary to cognitive impairment but today pt reports the above    Objective:   BP  "106/63   Pulse 85   Temp 97  F (36.1  C)   Resp 18   Ht 1.702 m (5' 7.01\")   Wt 59 kg (130 lb)   SpO2 100%   BMI 20.36 kg/m    GENERAL APPEARANCE:  Alert, in no distress, chronically ill, frail appearing  RESP:  respiratory effort and palpation of chest normal, lungs clear to auscultation , no respiratory distress  CV:  Palpation and auscultation of heart done , regular rate and rhythm, no murmur, rub, or gallop, no edema  ABDOMEN:  normal bowel sounds, soft, nontender, no hepatosplenomegaly or other masses  M/S:   Gait and station abnormal transfers with assist, wc for mobility  SKIN:  Inspection of skin and subcutaneous tissue baseline, Palpation of skin and subcutaneous tissue baseline, sacral and IT ulcers with wound vacs connected and running  NEURO:   Cranial nerves 2-12 are normal tested and grossly at patient's baseline  PSYCH:  insight and judgement impaired, memory impaired , flat affect, minimally cooperative    Labs done in SNF are in [facility]. Please refer to them using [facility]Care Everywhere. and Recent labs in [facility] reviewed by me today.     Assessment/Plan:  (L89.94,  L08.9) Infected decubitus ulcer, stage IV (H)  (primary encounter diagnosis)  (L89.619,  L89.629) Pressure injury of skin of both heels  Comment: chronic, treated with IV Zosyn and discharged on Augmentin completed 4/13. Afebrile.   -chase placed for moisture/wound management  -DVT prophy with enoxaparin through 5/1  Plan: Oyxcodone 5 mg daily and 5 mg Q6H PRN. enoxaprin daily through 5/1. Continue tylenol. Continue air mattress, offloading and repositioning per nursing. Follow-up with surgery per recommendations. Wound MD following, wound vac and dressing changes per her recommendations. Narcan PRN for sedation.     (I25.10) Coronary artery disease involving native coronary artery of native heart without angina pectoris  (Z86.73) History of CVA (cerebrovascular accident)  (I10) Essential hypertension  (E78.5) " Dyslipidemia  Comment: Chronic. SBP 90s intermittently. Metoprolol decreased 3/30, 4/10, 4/14.  BP Readings from Last 3 Encounters:   04/24/23 106/63   04/20/23 109/62   04/17/23 115/62   Plan: continue metoprolol 12.5 mg bid, asa 81 mg daily, statin, ntg PRN, monitor BP, cardiac status.     (E11.40,  Z79.4) Type 2 diabetes mellitus with diabetic neuropathy, with long-term current use of insulin (H)  Comment: chronic, liraglutide stopped inpatient. Glardine reduced in TCU then discontinued for BG 70s.            Lab Results   Component Value Date     A1C 7.6 01/03/2023     A1C 6.9 08/17/2022     A1C 8.1 06/17/2021   Plan: continue metformin  mg daily, discontinue glargine, continue sliding scale novolog, monitor BG ACHS     (D64.9) Chronic anemia  (D62) Acute blood loss anemia - resolved  Comment: acute on chronic anemia, received 3 U PRBC inpatient for hgb <7. Weekly CBC while on lovenox as above.  -weekly cbc not drawn per facility today, will reorder for tomorrow; discussed management with nursing facility staff today  Hemoglobin   Date Value Ref Range Status   04/17/2023 10.4 (L) 11.7 - 15.7 g/dL Final   08/06/2019 9.3 (L) 11.7 - 15.7 g/dL Final   Plan: monitor CBC weekly, transfuse for hgb <7. Recheck CBC tomorrow.     (E83.42) Hypomagnesemia - resolved  Comment: treated with mag inpatient             Magnesium   Date Value Ref Range Status   04/11/2023 2.0 1.7 - 2.3 mg/dL Final   08/06/2019 1.9 1.6 - 2.3 mg/dL Final   Plan: monitor mag PRN    (N17.9) ALY    (N18.2) CKD (chronic kidney disease) stage 2, GFR 60-89 ml/min  Comment: chronic, baseline Cr 0.6-0.9. With elevation 1.04 on 4/17, BMP not drawn per facility x 2 now, will reorder again for tomorrow; discussed management with nursing facility staff today        Lab Results   Component Value Date     CR 1.04 04/17/2023     CR 0.98 08/06/2019   Plan: Avoid nephrotoxins. Renally dose medications as appropriate. Monitor BMP periodically. Offer fluids  Q2HWA. Recheck BMP tomorrow.      (K21.9) Gastroesophageal reflux disease, unspecified whether esophagitis present  Comment: chronic  Plan: PPI     (Z86.19) History of hepatitis B  R79.89 Abnormal LFTs  Comment: chronic, with . ALT 55, AST 84 in TCU. ALP likely osseous source due to sacral osteo. Asymptomatic   Plan: follow-up outpatient, LFTs periodically      (H40.003) Glaucoma suspect, bilateral  Comment: Chronic  Plan: PTA Xalatan, cosopt drops     (F32.A) Depression, unspecified depression type  (G47.00) Insomnia, unspecified type  Comment: Chronic, flat affect.  Plan: continue Remeron, duloxetine, trazodone, melatonin. Monitor mood, behaviors. ACP consulted.      (E44.0) Moderate malnutrition (H)  (R63.4) Weight lloss  Comment: chronic, with poor intakes. RD following. Weight loss ~15 lbs since in TCU. RD adding supplements, diet changed to minced/moist due to poor dentition.   Plan: continue supplements per RD, modified diet, monitor intakes and weights, offer food preferences     (R62.7) Adult failure to thrive  (R41.89) Cognitive impairment  Comment: chronic, has been in [facility] since 2011 and wheelchair dependent. More rapid decline following COVID infection in December. Palliative consulted inpatient for goals of care. [facility] 16/30 in March 2023 Patient's decision making capacity is unreliable, she designated her daughters Valerie and Court as decision makers.   -disengaged and flat, does endorse restorative goals with a nod when prompted today  Plan: SNF for assist with ADLs, medication management, meals, activities     (K59.01) Slow transit constipation  Comment: chronic  Plan: monitor bowel habits, continue bowel regimen.     (R53.81) Physical deconditioning  Comment: chronic, related to acute and chronic conditions as above and recent hospitlaizatoin.  Discharged from therapy due to limited progress  Plan: SNF for assist with transfers, ADLs      Orders:  1. Recheck CBC, BMP  tomorrow.    Electronically signed by: LAMONT Ramírez CNP

## 2023-04-25 NOTE — LETTER
April 25, 2023    ZENY TONEY  C/O KIMBERLY TONEY  [address]  [address]  [place] [address]      Dear Zeny:    As a member of [facility] ([facility]) ([facility]ospit[facility] in [place]), you are provided a care coordinator. I will be your new care coordinator as of 5/1/2023. I will be calling you soon to see how you are doing and determine your needs.    If you have any questions, please feel free to call me at [phone]. If you reach my voice mail, please leave a message and your phone number. If you are hearing impaired, please call the [place] Relay at 127 or [phone] ([email or web address] relay service).    I look forward to speaking with you soon.    Sincerely,    Laine Torres RN, N  [phone]  [email or web address] is a health plan that contracts with both Medicare and the [place] Medical Assistance (Medicaid) program to provide benefits of both programs to enrollees. Enrollment in [facility] depends on contract renewal.      [facility]  [id number] DHS Approved ([id number])  [id number] (11/18)

## 2023-04-25 NOTE — PROGRESS NOTES
[facility] Care Coordination Contact    Internal CC change effective 5/1/2023.  Mailed member CC Change letter.  Additional tasks to be completed by CMS include: update database & EPIC, enter CC Change in MMIS, and move member files on Q drive.    Estefania Martinez  Case Management Specialist  [facility]  [phone]

## 2023-04-25 NOTE — RESULT ENCOUNTER NOTE
Orders  Cristal Preciado  1950     1. Consult IV team now for PIV placement  2. 1 L NS IV x 1 today now, remove IV when completed.  3. BMP 4/27/23   Dx: LAMONT Leonard CNP on 4/25/2023 at 12:58 PM

## 2023-04-28 NOTE — TELEPHONE ENCOUNTER
[facility]     Provider: LAMONT Ramírez CNP  Facility: [facility] ([place], [place])  Facility Type:  TCU    Allergies   Allergen Reactions     Dust Mites Other (See Comments)     Sneezing runny eyes and nose.     Other Environmental Allergy Hives     Mountain Dew     Pollen Extract Other (See Comments)     Sneezing runny eyes and nose.     Walnuts [Nuts] Hives       Labs Reviewed by provider: CBC and BMP    Verbal Order/Direction given by Provider: Recheck Hgb on 5/2.    Provider giving Order:  Latisha Neal MD    Verbal Order given to: Gary Painting RN

## 2023-04-30 NOTE — TELEPHONE ENCOUNTER
[facility] GERIATRICS TELEPHONE ENCOUNTER    Name/: Cristal Preciado (1950)  Nurse reports b/p 91/55. Metoprolol held per parameters. Pat now refusing to have b.p rechecked.     ASSESSMENT/PLAN  - ok to recheck b/p this evening with metoprolol dose.     Signed: Phyllis Lyn NP

## 2023-05-02 NOTE — PROGRESS NOTES
[facility]ATR[facility]    Chief Complaint   Patient presents with     RECHECK     HPI:  Cristal Preciado is a 73 year old  (1950), who is being seen today for an episodic care visit at: [facility]  (U) [[id number]].     By chart review, patient was hospitalized at [facility] 2/16-2/21/23 for sepsis secondary to stage IV sacral and right greater trochanter decubitus ulcers with sacral osteomyelitis. In ED, workup remarkable for sodium 132, Cr 0.92, WBC 14.1, hgb 10-No results found for: CDAUT, , AST 54 and ALT 36,  and LA 6.4. UA was abnromal with LE and many bacteria. CT demonstrated new sacral decubitus ulcer with subcutaeenous emphysema, new right trochater decubitus and cystitis. UC grew mixed brittani. Sacral wound tested positive for E coli, protus, enterococcus faecalis and corynebacterium. Right hip culutre grew E coli, proteus, enterococcus faecalis and bacteriodes. She was treated with Zosyn, surigcal service consulted. She underwent debridement of both ulcers 3/17/23. Postoperatively her hemoglobin dropped to 5.7 and she received 2 U PRBC, and agin 3/12 for hgb 6.4. She was also seen by GI, palliative, and RD for poor intakes, malnutrition and FTT. GI did not recommend EGD. She did receive TF briefly while inpatient. She was seen by thearpies who recommended TCU at discharge. She was discharged on oral agumentin x 4 weeks per ID. She was discharged to current facility for ongoing medical management, wound care, and rehab.    Today's concern is: Seen today for routine follow up in TCU. She has finished breakfast and requests assist to spit out her last bite of eggs. She reports she was full and did not want to eat it. She is a bit more conversant today, reports she has not been visited by her daughters. She is aware she is losing weight and reports appetite is fair. She is agreeable to the idea of getting out of bed more. She does not wish to engage in goals of care  "discussions. She is denying CP, SOB, changes in b/b habits, NVD, fever/chills, dizziness or light headedness.    Allergies, and PMH/PSH reviewed in EPIC today.  REVIEW OF SYSTEMS:  Limited secondary to cognitive impairment but today pt reports the above    Objective:   /72   Pulse 92   Temp 97.2  F (36.2  C)   Resp 16   Ht 1.702 m (5' 7.01\")   Wt 59 kg (130 lb)   SpO2 99%   BMI 20.36 kg/m    GENERAL APPEARANCE:  Alert, in no distress, chronically ill, frail appearing  RESP:  respiratory effort and palpation of chest normal, lungs clear to auscultation , no respiratory distress  CV:  Palpation and auscultation of heart done , regular rate and rhythm, no murmur, rub, or gallop, no edema  ABDOMEN:  normal bowel sounds, soft, nontender, no hepatosplenomegaly or other masses  M/S:   Gait and station abnormal transfers with assist, wheelchair for mobility  SKIN:  Inspection of skin and subcutaneous tissue baseline, Palpation of skin and subcutaneous tissue baseline, dry BLE  NEURO:   Cranial nerves 2-12 are normal tested and grossly at patient's baseline, generalized weakness  PSYCH:  insight and judgement impaired, memory impaired , flat affect    Labs done in SNF are in [place] [facility]. Please refer to them using [facility] Everywhere. and Recent labs in [facility] reviewed by me today.     Assessment/Plan:  (L89.154) Pressure injury of sacral region, stage 4 (H)  (primary encounter diagnosis)  (L89.224) Pressure injury of left hip, stage 4 (H)  (L89.619,  L89.629) Pressure injury of skin of both heels  Comment: chronic wounds, treated with IV Zosyn and discharged on Augmentin completed 4/13. Afebrile.   -chase placed for moisture/wound management  -DVT prophy with enoxaparin completed 5/1  -wound vacs currently removed, treating sacral/hip wounds with santyl  Plan: Oyxcodone 5 mg daily and 5 mg Q6H PRN. Continue tylenol. Continue air mattress, offloading and repositioning per nursing. Follow-up with surgery per " recommendations. Wound MD following, wound vac and dressing changes per her recommendations. Narcan PRN for sedation.    (I25.10) Coronary artery disease involving native coronary artery of native heart without angina pectoris  (Z86.73) History of CVA (cerebrovascular accident)  (I10) Essential hypertension  (E78.5) Dyslipidemia  Comment: Chronic. SBP 90s intermittently. Metoprolol decreased 3/30, 4/10, 4/14.  BP Readings from Last 3 Encounters:   05/02/23 107/72   04/24/23 106/63   04/20/23 109/62   Plan: continue metoprolol 12.5 mg bid, asa 81 mg daily, statin, ntg PRN, monitor BP, cardiac status.    (E11.40,  Z79.4) Type 2 diabetes mellitus with diabetic neuropathy, with long-term current use of insulin (H)  Comment: chronic, liraglutide stopped inpatient. Glardine reduced in TCU then discontinued for BG 70s.  -recent BG   Plan: continue metformin  mg daily, discontinue glargine, continue sliding scale novolog, monitor BG ACHS    (D64.9) Anemia  Comment: acute on chronic anemia, received 3 U PRBC inpatient for hgb <7. Weekly CBC while on lovenox as above. With drop 11>9 over the last week, stable on recheck today  Hemoglobin   Date Value Ref Range Status   05/02/2023 9.1 (L) 11.7 - 15.7 g/dL Final   08/06/2019 9.3 (L) 11.7 - 15.7 g/dL Final   Plan: Monitor CBC periodically, transfuse for hgb <7    (N18.2) Stage 2 chronic kidney disease  Comment: chronic, baseline Cr 0.6-0.9, more recent baseline 1-1.1. She received 1 L IVF 4/25 with slight improvement in Cr.   Plan: continue to encourage fluids,. Avoid nephrotoxins. Renally dose medications as appropriate. Monitor BMP.    (K21.9) Gastroesophageal reflux disease, unspecified whether esophagitis present  Comment: chronic  Plan: continue PPI    (Z86.19) History of hepatitis B  (R79.89) Abnormal LFTs  Comment: chronic, with . ALT 55, AST 84 in TCU. ALP likely osseous source due to sacral osteo. Asymptomatic   Plan: follow-up outpatient, LFTs  periodically     (F32.A) Depression, unspecified depression type  (G47.00) Insomnia, unspecified type  Comment: Chronic, flat affect. Slightly more engaged today  Plan: continue Remeron, duloxetine, trazodone, melatonin. Monitor mood, behaviors. ACP consulted.     (E44.0) Moderate malnutrition (H)  (R63.4) Weight loss  Comment: chronic, with poor intakes. RD following. Weight loss ~15 lbs since in TCU. RD adding supplements, diet changed to minced/moist due to poor dentition.   Plan: continue supplements per RD, modified diet, monitor intakes and weights, offer food preferences    (R62.7) Adult failure to thrive  (R41.89) Cognitive impairment  Comment: chronic, low initiative and engagement, has been in [facility] since 2011 and wheelchair dependent. More rapid decline following COVID infection in December. Palliative consulted inpatient for goals of care. SLUMS 16/30 in March 2023 Patient's decision making capacity is unreliable, she designated her daughter Valerie as decision maker.   -slightly more engaged today, but does not wish to discuss goals of care. She is agreeable to getting OOB, discussed management with nursing facility staff today. Daughters Shantelle Padilla have not returned my calls, I have requested a care conference be arranged given her limited progress, weight loss, poor PO intakes. Patient/family goals inpatient were to regain ambulatory status and move out of state; this does not appear to be a realistic goal any longer.   Plan: SNF for assist with ADLs, medication management, meals, activities. Ongoing goals of care discussions.    (K59.01) Slow transit constipation  Comment: chronic  Plan: monitor bowel habits, continue bowel regimen.    (R53.81) Physical deconditioning  Comment: chronic, related to acute and chronic conditions as above and recent hospitlaizatoin.  Discharged from therapy due to limited progress  Plan: SNF for assist with transfers, ADLs    Electronically signed by:  LAMONT Ramírez CNP

## 2023-05-03 NOTE — PROGRESS NOTES
[place] [facility]  May 4, 2023      CHIEF COMPLAINT:  Episodic visit    HPI:    Cristal Preciado is a 73 year old  (1950), who is being seen today for an episodic care visit at [facility].     Medical history is notable for cognitive impairment, CAD, hypertension, dyslipidemia, DM type II, diabetic neuropathy, CVA, CKD, chronic anemia, GERD, gout, lower extremity edema, UTI, hepatitis B, endometrial hyperplasia, allergic rhinitis, glaucoma, depression, COVID-19 infection, and pressure injury of left buttock and right hip.     Summary of hospital course:  Patient was hospitalized at [facility] from February 16 through March 21, 2023 for severe sepsis secondary to infected stage IV sacral and right greater trochanter decubitus ulcers with underlying sacral osteomyelitis.  Initial work-up was significant for sodium of 132, creatinine of 0.93, white count of 14.1, hemoglobin of 10.5, alkaline phosphatase of 318, AST of 54, ALT of 36, CRP of 337.7, and lactic acid of 6.4.  UA was abnormal showing cloudy urine, 31 WBCs, 29 RBCs, large leukocyte esterase, and many bacteria.  CT pelvis was remarkable for new sacral decubitus ulcer with subcutaneous emphysema extending into the surrounding soft tissues, new right greater trochanter decubitus ulcer, and cystitis.  Urine culture grew mixed urogenital brittani.  Sacral wound culture was positive for E. coli, Proteus mirabilis, Enterococcus faecalis, normal brittani, and Corynebacterium striatum.  Right hip wound culture yielded E. coli, Proteus mirabilis, Enterococcus faecalis, and Bacteroides.  She was started on IV Zosyn.  She was seen by surgical service and underwent debridement of sacral and right hip ulcers on March 17, 2023.  Postop hemoglobin dropped to 5.7 and she was transfused with 2 units of PRBC.  She also received another blood transfusion on March 12 for hemoglobin of 6.4.  Multiple services were consulted inpatient including  GI, palliative care, and nutrition due to poor oral intake, malnutrition, and FTT.  GI did not recommend EGD.  WOC RN was consulted for wound care.  Patient received tube feeding temporarily in the hospital.  TCU was recommended per therapies.  She was discharged on oral Augmentin for 4 weeks from March 17, per ID recommendations. She was then admitted to this facility for medical management, nursing care, and rehab.       Today's visit:  Patient was seen in her room, while lying in bed.  She appears frail but in no acute distress.  She complains of pain in her buttock.  She had a bowel movement last night.  She has a Cornejo catheter.  She denies fever, chills, chest pain, palpitation, dyspnea, nausea, vomiting, abdominal pain, or urinary symptoms.      CODE STATUS:   CPR/Full code     PAST MEDICAL HISTORY:   Severe sepsis secondary to infected stage IV sacral and right greater trochanter decubitus ulcers with underlying sacral osteomyelitis, s/p debridement on March 17, 2023  Bilateral heel pressure ulcers, unstageable  CAD, s/p stent to RCA in 2009  Hypertension  Dyslipidemia  DM type II  Diabetic neuropathy  CVA  CKD stage II; baseline creatinine 0.6-0.9  Chronic anemia, baseline hemoglobin 10-11  GERD  Gout  Lower extremity edema  UTI  Hepatitis B  Atypical endometrial hyperplasia, s/p robotic assisted total hysterectomy and bilateral salpingo-oophorectomy in August 2019  Allergic rhinitis  Glaucoma  Depression  COVID-19 infection in December 2022  Moderate malnutrition  Failure to thrive   Cognitive impairment (SLUMS score 16/30 in March 2023)      Past Surgical, Family, and Social History were reviewed and updated in [facility].    Current Outpatient Medications   Medication Sig Dispense Refill     acetaminophen (TYLENOL) 500 MG tablet Take 1,000 mg by mouth 3 times daily       amoxicillin-clavulanate (AUGMENTIN) 875-125 MG tablet Take 1 tablet by mouth every 12 hours (Patient taking differently: Take 1 tablet by  mouth every 12 hours Through April 13, 2023)       aspirin (ASPIRIN LOW DOSE) 81 MG chewable tablet 1 tablet (81 mg) by Oral or NG Tube route daily       atorvastatin (LIPITOR) 80 MG tablet 1 tablet (80 mg) by Oral or NG Tube route every evening       diclofenac (VOLTAREN) 1 % topical gel Apply 2 g topically 4 times daily       diphenhydrAMINE (BENADRYL) 25 MG capsule Take 1 capsule (25 mg) by mouth every 6 hours as needed for itching       dorzolamide-timolol (COSOPT) 2-0.5 % ophthalmic solution Place 1 drop into both eyes 2 times daily 10 mL 3     DULoxetine (CYMBALTA) 30 MG capsule 3 capsules (90 mg) by Oral or NG Tube route every morning       enoxaparin ANTICOAGULANT (LOVENOX) 30 MG/0.3ML syringe Inject 0.3 mLs (30 mg) Subcutaneous every 24 hours (Patient taking differently: Inject 30 mg Subcutaneous every 24 hours Until 5/1/23)       insulin aspart (NOVOLOG PEN) 100 UNIT/ML pen Inject 1-10 Units Subcutaneous 3 times daily (before meals) (Patient taking differently: Inject 2-12 Units Subcutaneous 3 times daily as needed 1) Sliding scale  Novolog TID before meals:  BS <200 give               0 units  -249                 2 units  -299                 4 units  -349                 6 units  -399                 8 units  -450                 10 units  BS>450                       12 units) 15 mL      latanoprost (XALATAN) 0.005 % ophthalmic solution Place 1 drop into both eyes At Bedtime 5 mL 2     melatonin 1 MG TABS tablet Take 1 tablet (1 mg) by mouth nightly as needed for sleep       melatonin 3 MG tablet Take 3 mg by mouth At Bedtime       metFORMIN (GLUCOPHAGE-XR) 750 MG 24 hr tablet Take 1 tablet (750 mg) by mouth 2 times daily (with meals)       methocarbamol (ROBAXIN) 500 MG tablet Take 1 tablet (500 mg) by mouth 4 times daily as needed for muscle spasms       metoprolol tartrate (LOPRESSOR) 25 MG tablet Take 12.5 mg by mouth 2 times daily Hold for SBP less than 100 or HR less  "than 55       mirtazapine (REMERON) 15 MG tablet 1 tablet (15 mg) by Oral or NG Tube route At Bedtime       multivitamin w/minerals (THERA-VIT-M) tablet 1 tablet by Oral or Feeding Tube route daily       nitroGLYCERIN (NITROSTAT) 0.4 MG SL tablet Place 0.4 mg under the tongue every 5 minutes as needed.       Nutritional Supplements (KIMMIE) PACK Take 1 packet by mouth 2 times daily (with meals)       olopatadine (PATANOL) 0.1 % ophthalmic solution daily       pantoprazole (PROTONIX) 40 MG EC tablet Take 1 tablet (40 mg) by mouth every morning (before breakfast)       polyvinyl alcohol (LIQUIFILM TEARS) 1.4 % ophthalmic solution Place 1 drop into both eyes 3 times daily       senna-docusate (SENOKOT-S/PERICOLACE) 8.6-50 MG tablet Take 2 tablets by mouth 2 times daily as needed for constipation 60 tablet 0     simethicone (MYLICON) 80 MG chewable tablet 1 tablet (80 mg) by Oral or NG Tube route 4 times daily as needed       traZODone (DESYREL) 50 MG tablet 1 tablet (50 mg) by Oral or NG Tube route At Bedtime         MED REC REQUIRED  Post Medication Reconciliation Status: medication reconcilation previously completed during another office visit      ALLERGIES: Dust mites, Other environmental allergy, Pollen extract, and Walnuts [nuts]    REVIEW OF SYSTEMS:  10 point ROS were negative other than the symptoms noted above in the HPI.    PHYSICAL EXAM:  Vital signs were reviewed in the chart.  Vital Signs:  /62   Pulse 95   Temp 97.3  F (36.3  C)   Resp 16   Ht 1.702 m (5' 7\")   Wt 59 kg (130 lb)   SpO2 98%   BMI 20.36 kg/m    General: Frail appearing but comfortable and in no acute distress  HEENT: Conjunctival pallor, no scleral icterus or injection, moist oral mucosa  Cardiovascular: Normal S1, S2, RRR  Respiratory: Lungs clear to auscultation bilaterally  GI: Abdomen soft, non-tender, non-distended, +BS  Extremities: No LE edema  Neuro: CX II-XII grossly intact; ROM in all four extremities grossly " intact  Psych: Alert and oriented almost x3; flat affect  Skin: 2 large and deep decubitus ulcers on sacrum and right greater trochanter area    LABORATORY/IMAGING DATA:  All relevant labs and imaging data in [facility] and/or Care Everywhere were personally reviewed today.      Most Recent 3 CBC's:Recent Labs   Lab Test 05/02/23  1246 04/28/23  0700 04/25/23  0936   WBC 7.4 9.2 8.2   HGB 9.1* 9.3* 11.8   MCV 87 89 87   * 498* 498*     Most Recent 3 BMP's:Recent Labs   Lab Test 04/28/23  0700 04/25/23  0936 04/17/23  0700   * 135* 133*   POTASSIUM 4.5 5.1 4.5   CHLORIDE 109* 106 102   CO2 13* 17* 18*   BUN 32.6* 50.9* 32.9*   CR 1.02* 1.13* 1.04*   ANIONGAP 13 12 13   OLAF 8.9 9.3 8.7*   GLC 91 72 102*     Most Recent 2 LFT's:Recent Labs   Lab Test 04/17/23  0700 03/13/23  0620   AST 84* 60*   ALT 55* 32   ALKPHOS 306* 248*   BILITOTAL 0.2 0.2       ASSESSMENT/PLAN:  Infected stage IV sacral and right greater trochanter decubitus ulcers with sacral osteomyelitis, s/p debridement on March 17, 2023,  Bilateral heel pressure ulcers, unstageable, present on admission.  She was treated with IV Zosyn in the hospital and discharged on oral Augmentin to continue for 4 weeks from March 17, per ID recommendations; completed on April 13.  DVT prophylaxis with enoxaparin was discontinued on May 1.  Stable and afebrile.  Intermittent pain in her bottom/hip.  Plan:  Continue air mattress  Continue pain management with acetaminophen and oxycodone as ordered  Continue wound care/wound VACs change per instructions   Continue Cornejo for wound healing  Follow-up with surgery as directed     CAD, s/p stent to RCA in 2009,  History of CVA,  Essential hypertension,  Dyslipidemia.  Metoprolol dose was reduced due to soft blood pressures.  Blood pressure is now fairly controlled.  Plan:  Continue metoprolol 12.5 mg twice daily with hold parameters  Continue 81 mg daily  Continue atorvastatin 80 mg daily  PRN NTG SL  Monitor blood  pressure and cardiac status     DM type II,  Diabetic neuropathy.  Last hemoglobin A1c was 7.6% in January 2023.  PTA liraglutide was discontinued in the hospital.  Blood glucose levels are now in the range of .  Plan:  Decrease insulin glargine from 6 units twice daily to 6 units subcu at bedtime  Continue metformin  mg twice daily  Continue sliding-scale NovoLog  Monitor blood glucose before meals     Acute blood loss anemia,  Chronic anemia  Baseline hemoglobin 10-11.  Patient received  total of 3 units of PRBC in the hospital for hemoglobin <7.  Last hemoglobin was 9.1 on May 2.  Plan:  Monitor hemoglobin periodically   Transfuse PRN for hemoglobin less than 7     CKD stage II.  Baseline creatinine 0.6-0.9.  Last creatinine was 1.02 on April 28.  Plan:  Keep patient adequately hydrated  Avoid NSAIDs and nephrotoxins  Monitor renal function periodically     GERD.  Plan:  Continue pantoprazole 40 mg daily     History of hepatitis B,  Abnormal LFT.  Last LFT on April 17, 2023 with alkaline phosphatase of 306, ALT of 55, AST of 84, and total bilirubin of 0.2.  Elevated alk phos likely from an osseous source due to sacral bone osteomyelitis/debridement.  Plan:  Monitor LFT periodically  Follow-up as outpatient     Hypomagnesemia.  Replaced.  Last magnesium level was normal at 2 on April 11.  Plan:  Monitor magnesium level periodically     Glaucoma.  Plan:  Continue PTA Xalatan and Cosopt ophthalmic drops     Depression,  Insomnia.  Patient has depressed affect.  Plan:  Continue mirtazapine 15 mg at bedtime  Continue duloxetine 90 mg daily  Continue trazodone 50 mg at bedtime  Continue melatonin 1 mg at bedtime as needed  Monitor symptoms  ACP referral order last visit     Cognitive impairment.  SLUMS score 16/30 this stay.  On today's examination, she is oriented almost x3.  Plan:  Standard delirium precautions  Ongoing cognitive evaluation per OT for safe discharge planning     Dysphagia,  Moderate  malnutrition,  Failure to thrive.  Patient received tube feeding through NG tube in the hospital.  She is currently on dysphagia diet level 2 with regular consistency.  Plan:  Continue DDL2  Continue dietary supplements  Continue SLP evaluation and therapy  Facility dietitian is following the patient     Slow transit constipation.  Plan:  Continue the bowel regimen     Physical deconditioning.  Patient requires assist for transfers.  Plan:  Continue PT/OT evaluation and therapy        Orders written by provider at facility:  Change insulin glargine from 6 units subcu twice daily to 6 units subcu at bedtime, hold for blood glucose less than 120; DX: DM type II      Recommendation by provider at facility:  Follow-up on CBC and CMP from today, May 4        Disclaimer: This note may contain text created using speech-recognition software and may contain unintended word substitutions.      Electronically signed by  Jaja Vail MD

## 2023-05-04 NOTE — TELEPHONE ENCOUNTER
"[facility] Telephone Encounter    HPI: 72 yo female PMH CAD, HTN, DM II, CVA, CKD, anemia, GERD, gout, UTI, hepatitis B, endometrial hyperplasia, depression, hx COVID 19 infection, pressure injury of sacrum and right hip    Reason for Call: Nursing reporting patient declining medications and meals. Metformin held. She did take oxycodone for wound pain. VSS \"normal\" for patient, no fever/chills. She does look to have dry skin in her legs. No NVD, abd pain.     Onset: gradual     Symptoms: anorexia     Interventions Tried: supportive measures     A/P: acute on chronic poor PO intakes, with chronic FTT. Weight loss 15 lbs since admission 3/21. Broad differentials considered including infection of wounds, UTI, will obtain labs tomorrow.  She has an indwelling catheter but no indication for UA currently. Requested nursing push fluids, okay to diabetic agents if no PO intake. No reports of nausea so will hold on antiemetics, continue PPI. Requested nursing update family who are reportedly aware and planning to converse with patient this afternoon. Continue VS monitoring,  Monitor for s/sx infection, encourage fluids and offer food preferences.    Imaging Ordered: No    Labs Ordered: yes: Labs to be drawn: CBC, CMP date 5/4/23 telephone order given to Brisa LOPES    Medication Ordered:  No     Sent to ED:  No    Orders  Cristal Preciado  1950     1. CBC, CMP 5/4/23  2. Encourage fluids      LAMONT Ramírez CNP on 5/3/2023 at 13:20 PM      "

## 2023-05-04 NOTE — RESULT ENCOUNTER NOTE
Marco Preciado  1950     1. US RUQ for elevated Lfts, anorexia  2. Repeat CMP 5/8/823 dx: anorexia, elevated lfts      LAMONT Ramírez CNP on 5/4/2023 at 3:27 PM

## 2023-05-04 NOTE — PATIENT INSTRUCTIONS
Orders for Cristal Preciado (1950), MR# [id number]:    1) Change insulin glargine from 6 units subcu twice daily to 6 units subcu at bedtime; hold for blood glucose less than 120; DX: DM type II    Jaja Vail MD  [facility]

## 2023-05-08 PROBLEM — R79.89 ELEVATED LFTS: Status: ACTIVE | Noted: 2023-01-01

## 2023-05-08 PROBLEM — L08.9: Status: ACTIVE | Noted: 2023-01-01

## 2023-05-08 PROBLEM — E87.1 HYPONATREMIA: Status: ACTIVE | Noted: 2023-01-01

## 2023-05-08 PROBLEM — L89.40: Status: ACTIVE | Noted: 2023-01-01

## 2023-05-08 PROBLEM — L89.94: Status: ACTIVE | Noted: 2023-01-01

## 2023-05-08 PROBLEM — D64.9 ANEMIA, UNSPECIFIED TYPE: Status: ACTIVE | Noted: 2023-01-01

## 2023-05-08 PROBLEM — N18.9 CHRONIC KIDNEY DISEASE, UNSPECIFIED CKD STAGE: Status: ACTIVE | Noted: 2023-01-01

## 2023-05-08 NOTE — ED TRIAGE NOTES
Pt arrives via EMS from [facility] w/ complaints of Stage 4 pressure ulcer on coccyx presenting w/ increased erythema and oozing puss. Pt also is reported to have another pressure ulcer on her hip. VSS, SBP: 105.      Triage Assessment     Row Name 05/08/23 0551       Triage Assessment (Adult)    Airway WDL WDL       Respiratory WDL    Respiratory WDL WDL       Skin Circulation/Temperature WDL    Skin Circulation/Temperature WDL WDL       Cardiac WDL    Cardiac WDL WDL       Peripheral/Neurovascular WDL    Peripheral Neurovascular WDL WDL       Cognitive/Neuro/Behavioral WDL    Cognitive/Neuro/Behavioral WDL X;mood/behavior    Mood/Behavior anxious       Pupils (CN II)    Pupil PERRLA yes       [place] Coma Scale    Best Eye Response 4-->(E4) spontaneous    Best Motor Response 6-->(M6) obeys commands    Best Verbal Response 5-->(V5) oriented    Pavel Coma Scale Score 15

## 2023-05-08 NOTE — PROGRESS NOTES
[facility] GERIATRICS    Chief Complaint   Patient presents with     Nursing Home Acute     HPI:  Cristal Preciado is a 73 year old  (1950), who is being seen today for an episodic care visit at: [facility]  (U) [[id number]].        By chart review, patient was hospitalized at [facility] 2/16-2/21/23 for sepsis secondary to stage IV sacral and right greater trochanter decubitus ulcers with sacral osteomyelitis. In ED, workup remarkable for sodium 132, Cr 0.92, WBC 14.1, hgb 10-No results found for: CDAUT, , AST 54 and ALT 36,  and LA 6.4. UA was abnromal with LE and many bacteria. CT demonstrated new sacral decubitus ulcer with subcutaeenous emphysema, new right trochater decubitus and cystitis. UC grew mixed brittani. Sacral wound tested positive for E coli, protus, enterococcus faecalis and corynebacterium. Right hip culutre grew E coli, proteus, enterococcus faecalis and bacteriodes. She was treated with Zosyn, surigcal service consulted. She underwent debridement of both ulcers 3/17/23. Postoperatively her hemoglobin dropped to 5.7 and she received 2 U PRBC, and agin 3/12 for hgb 6.4. She was also seen by GI, palliative, and RD for poor intakes, malnutrition and FTT. GI did not recommend EGD. She did receive TF briefly while inpatient. She was seen by thearpies who recommended TCU at discharge. She was discharged on oral agumentin x 4 weeks per ID. She was discharged to current facility for ongoing medical management, wound care, and rehab.    Today's concern is: Seen today for follow-up. Nursing report ongoing poor PO intakes, previously ordered US not available until Weds. CMP reveals further elevated LFTs.  No US available until Weds. Seen today in her room in TCU resting abed. She is irritable, minimally participative with exam/ROS and denying acute concerns. She denies abdominal pain, CP, Sob, changes in b/b habits, increased wound pain, dizziness or light headendess,  "fever/chills. Seen for reassessment with wound care team rounding, sacral wound with large amount of odorous purulent drainage. We reviewed goals of care, patient is agreeable to transport to ED for additional workup and management.      Allergies, and PMH/PSH reviewed in EPIC today.  REVIEW OF SYSTEMS:  Limited secondary to cognitive impairment but today pt reports     Objective:   BP 94/57   Pulse 83   Temp 97.9  F (36.6  C)   Resp 18   Ht 1.702 m (5' 7.01\")   Wt 59 kg (130 lb)   SpO2 94%   BMI 20.36 kg/m    GENERAL APPEARANCE:  Alert, in no distress, frail, chronically ill appearing  RESP:  respiratory effort and palpation of chest normal, lungs clear to auscultation , no respiratory distress  CV:  Palpation and auscultation of heart done , regular rate and rhythm, no murmur, rub, or gallop, no edema  ABDOMEN:  normal bowel sounds, soft, nontender, no hepatosplenomegaly or other masses  M/S:   Gait and station abnormal non-ambulatory, primarly confined to bed due to wounds but also by her preference  SKIN:  sacral wound with large amount of purulent drainage, large amount of slough, malodorous drainage, non-tender, exposed bone  NEURO:   Cranial nerves 2-12 are normal tested and grossly at patient's baseline, davison, follows simple commands intermittently, delayed verbal response  PSYCH:  insight and judgement impaired, memory impaired , flat affecdt, minimally cooperative    Labs done in SNF are in [place] [facility]. Please refer to them using [facility]/Care Everywhere. and Recent labs in [facility] reviewed by me today.     Assessment/Plan:  Assessment/Plan:  (L89.94,  L08.9) Infected decubitus ulcer, stage IV (H)  (L89.619,  L89.629) Pressure injury of skin of both heels  Comment: chronic wounds, treated with IV Zosyn and discharged on Augmentin completed 4/13. Afebrile.   -chase placed for moisture/wound management  -DVT prophy with enoxaparin completed 5/1  -wound vacs currently removed, treating sacral/hip wounds with " santyl  -patient's daughters have not returned my calls since 4/14 nor come to visit patient in facility at my request. I am told a care conference was held last Wednesday which I was not invited to which patient's daughter Valerie attended by phone.   -Exam today concerning for recurrent infection, discussed with patient albeit with limited insight and an unreliable decision maker. I called patient's daughter/HCA Valerie at 11:54 and discussed concerning wound findings, lab workup, ongoing anorexia and overall failure to thrive with guarded prognosis. Valerie was intiitally dismissive of my concerns and told me she would like patient to follow up with primary in the community. She does not believe patient is not eating and requested to speak with the patient. Phone was brought to bedside although patient was occupied with the wound care team at which point Valerie abruptly ended the call. I returned a call at 12:07 PM and notified Valerie that patient is willing to return to the hospital and attempted to explain the complexity of outpatient follow up: patient has been followed 1-2x weekly for the last 7 weeks which wouldl be difficult to continue outpatient given her debility in addition to her inability to transfer in clinic for wound assessment, etc. Valerie ultimately agreed to transport to ED.  -I discussed with the wound care provider via video call with nursing staff present, suspected infection and provider agreed with recommendations for ED transfer given concurrent symptoms  Plan: Transport to [facility] ED for workup    (Z86.19) History of hepatitis B  (R79.89) Abnormal LFTs  (R63.0) Anorexia  Comment: acute anorexia x 1 week with ongoing elevation of LFTs. ALP likely osseous source due to sacral osteo, concern for worsening. She is denying abdominal pain, imaging not available to Weds, discussed ED transport with daughter as above.  Plan: Transport to [facility] as above.     (I25.10) Coronary artery disease involving  native coronary artery of native heart without angina pectoris  (Z86.73) History of CVA (cerebrovascular accident)  (I10) Essential hypertension  (E78.5) Dyslipidemia  Comment: Chronic. SBP 90s intermittently. Metoprolol decreased 3/30, 4/10, and 4/14.   Plan: continue metoprolol 12.5 mg bid, asa 81 mg daily, statin, ntg PRN, monitor BP, cardiac status.     (E11.40,  Z79.4) Type 2 diabetes mellitus with diabetic neuropathy, with long-term current use of insulin (H)  Comment: chronic, liraglutide stopped inpatient. Glargine reduced in TCU then discontinued for BG 70s.  -recent BG   Plan: continue metformin  mg daily, discontinue glargine, continue sliding scale novolog, monitor BG ACHS     (D64.9) Anemia  Comment: acute on chronic anemia, received 3 U PRBC inpatient for hgb <7. Hgbs recently ~9, likely related to poor PO intakes  Plan: Monitor CBC periodically, transfuse for hgb <7     (N18.2) Stage 2 chronic kidney disease  Comment: chronic, baseline Cr 0.6-0.9, more recent baseline 1-1.1. She received 1 L IVF 4/25 with slight improvement in Cr.   Plan: continue to encourage fluids,. Avoid nephrotoxins. Renally dose medications as appropriate. Monitor BMP.     (K21.9) Gastroesophageal reflux disease, unspecified whether esophagitis present  Comment: chronic  Plan: continue PPI      (F32.A) Depression, unspecified depression type  (G47.00) Insomnia, unspecified type  Comment: Chronic, flat affect. Slightly more engaged today  Plan: continue Remeron, duloxetine, trazodone, melatonin. Monitor mood, behaviors. ACP consulted.      (E44.0) Moderate malnutrition (H)  (R63.4) Weight loss  Comment: chronic, with poor intakes. RD following. Weight loss ~15 lbs since in TCU. RD adding supplements, diet changed to minced/moist due to poor dentition.   Plan: continue supplements per RD, modified diet, monitor intakes and weights, offer food preferences     (R62.7) Adult failure to thrive  (R41.89) Cognitive  impairment  Comment: chronic, low initiative and engagement, has been in [facility] since 2011 and wheelchair dependent by records. More rapid decline following COVID infection in December. Palliative consulted inpatient for goals of care. SLUMS 16/30 in March 2023. Patient's decision making capacity is unreliable, she designated her daughter Valerie as decision maker.   -discussion as above, patient/family is agreeable with ED transport  Plan: SNF for assist with ADLs, medication management, meals, activities. Ongoing goals of care discussions.     (K59.01) Slow transit constipation  Comment: chronic  Plan: monitor bowel habits, continue bowel regimen.     (R53.81) Physical deconditioning  Comment: chronic, related to acute and chronic conditions as above and recent hospitlaizatoin.  Discharged from therapy due to limited progress  Plan: SNF for assist with transfers, ADLs    Total time spent with patient visit at the skilled nursing facility was 65 minutes including patient visit x2, review of past records, phone call to patient contact x2 and discussion with nursing staff and wound care provider.       Electronically signed by: LAMONT Ramírez CNP

## 2023-05-08 NOTE — ED PROVIDER NOTES
History     Chief Complaint:  Wound Infection       HPI   Cristal Preciado is a 73 year old female with a history of Cognitive impairment, CAD, hypertension, diabetes type 2, and sacral ulcer who presents for evaluation of rising LFTs and worsening sacral decubitus ulcer findings and concern for infection.  The patient is not able to describe in detail any concerns but does note ongoing pain to the left buttock.    Independent Historian: Daughter confirms concerns regarding wound care and infection    Review of External Notes: Reviewed May 4, 2023 geriatrics note    ROS:  Review of Systems    Allergies:  Dust Mites  Other Environmental Allergy  Pollen Extract  Walnuts [Nuts]     Medications:    acetaminophen (TYLENOL) 500 MG tablet  aspirin (ASPIRIN LOW DOSE) 81 MG chewable tablet  atorvastatin (LIPITOR) 80 MG tablet  diclofenac (VOLTAREN) 1 % topical gel  diphenhydrAMINE (BENADRYL) 25 MG capsule  dorzolamide-timolol (COSOPT) 2-0.5 % ophthalmic solution  DULoxetine (CYMBALTA) 30 MG capsule  enoxaparin ANTICOAGULANT (LOVENOX) 30 MG/0.3ML syringe  insulin aspart (NOVOLOG PEN) 100 UNIT/ML pen  insulin glargine (LANTUS PEN) 100 UNIT/ML pen  latanoprost (XALATAN) 0.005 % ophthalmic solution  melatonin 1 MG TABS tablet  melatonin 3 MG tablet  metFORMIN (GLUCOPHAGE-XR) 750 MG 24 hr tablet  methocarbamol (ROBAXIN) 500 MG tablet  metoprolol tartrate (LOPRESSOR) 25 MG tablet  mirtazapine (REMERON) 15 MG tablet  multivitamin w/minerals (THERA-VIT-M) tablet  nitroGLYCERIN (NITROSTAT) 0.4 MG SL tablet  Nutritional Supplements (KIMMIE) PACK  olopatadine (PATANOL) 0.1 % ophthalmic solution  pantoprazole (PROTONIX) 40 MG EC tablet  polyvinyl alcohol (LIQUIFILM TEARS) 1.4 % ophthalmic solution  senna-docusate (SENOKOT-S/PERICOLACE) 8.6-50 MG tablet  simethicone (MYLICON) 80 MG chewable tablet  traZODone (DESYREL) 50 MG tablet        Past Medical History:    Past Medical History:   Diagnosis Date     AION (anterior ischaemic optic  neuropathy), left eye      CAD (coronary artery disease) 3/2009     Cataract      CVA (cerebral vascular accident) (H)      Depression      Diabetes mellitus, type 2 (H)      Diabetic nephropathy (H)      Diabetic neuropathy (H)      Diabetic retinopathy (H)      GERD (gastroesophageal reflux disease)      Hyperlipidemia      Hypertension      Infection due to 2019 novel coronavirus 2023     POAG (primary open-angle glaucoma)      Seasonal allergies      Tubular adenoma of colon        Past Surgical History:    Past Surgical History:   Procedure Laterality Date     CARDIAC CATHETERIZATION       CATARACT EXTRACTION W/ INTRAOCULAR LENS IMPLANT Bilateral       SECTION        SECTION       COLONOSCOPY  7/15/2013    Tubular adenoma; repeat in ;Procedure: COMBINED COLONOSCOPY, SINGLE BIOPSY/POLYPECTOMY BY BIOPSY;;  Surgeon: Don King MD;  Tubular adenoma     COLONOSCOPY N/A 2020    Procedure: COLONOSCOPY;  Surgeon: Sid Amanda MD;  Location: [facility] GI     CORONARY STENT PLACEMENT  2004    RCA     DAVINCI HYSTERECTOMY TOTAL, BILATERAL SALPINGO-OOPHORECTOMY, COMBINED N/A 2019    Procedure: DaVinci Assisted Total Laparoscopic Hysterectomy, Removal Of Both Tubes And Ovaries;  Surgeon: Linh Cardoso MD;  Location: [facility] OR     EXTRACAPSULAR CATARACT EXTRATION WITH INTRAOCULAR LENS IMPLANT  11-10-09, 2-9-10    11-10-09 Lt, 2-9-10 Rt; Left eye 2012     HYSTERECTOMY TOTAL ABDOMINAL, BILATERAL SALPINGO-OOPHORECTOMY, COMBINED  2019    Procedure: DaVinci Assisted Total Laparoscopic Hysterectomy, Removal Of Both Tubes And Ovaries; Surgeon: Linh Cardoso MD; Location: [facility] OR       IRRIGATION AND DEBRIDEMENT TRUNK, COMBINED Right 2023    Procedure: DEBRIDEMENT OF SACRAL ULCER,  DEBRIDEMENT OF RIGHT HIP ULCER;  Surgeon: Shawna Ragsdale MD;  Location: [facility] OR     PICC TRIPLE LUMEN PLACEMENT  2023          STENT, CORONARY, DELORES      RCA     "    Family History:    family history includes Arthritis in her brother; Cancer in her father; Cancer - colorectal in an other family member; Cerebrovascular Disease in her mother and another family member; Diabetes in her sister and sister; Glaucoma in her father, sister, and sister; Hypertension in her mother.    Social History:   reports that she quit smoking about 10 years ago. Her smoking use included cigarettes. She started smoking about 55 years ago. She has a 67.50 pack-year smoking history. She has never used smokeless tobacco. She reports that she does not currently use alcohol. She reports that she does not use drugs.  PCP: Dayna Marie     Physical Exam     Patient Vitals for the past 24 hrs:   BP Temp Temp src Pulse Resp SpO2 Height   05/08/23 1730 103/62 -- -- 99 -- 98 % --   05/08/23 1715 91/61 -- -- (!) 131 -- (!) 37 % --   05/08/23 1700 92/54 -- -- (!) 46 -- 100 % --   05/08/23 1645 100/56 -- -- 100 -- 100 % --   05/08/23 1630 117/68 -- -- 98 -- 100 % --   05/08/23 1615 114/60 -- -- 99 -- 100 % --   05/08/23 1600 110/64 -- -- 98 -- 95 % --   05/08/23 1500 92/40 -- -- 97 -- 100 % --   05/08/23 1357 -- -- -- -- -- -- 1.702 m (5' 7\")   05/08/23 1350 121/85 97.8  F (36.6  C) Oral 95 16 100 % --        Physical Exam  Constitutional: Alert, attentive, chronically ill-appearing  HENT:    Nose: Nose normal.    Mouth/Throat: Oropharynx is clear, mucous membranes are moist   Eyes: EOM are normal.   CV: regular rate and rhythm; no murmurs, rubs or gallups  Chest: Effort normal and breath sounds normal.   GI:  There is no tenderness. No distension. Normal bowel sounds  MSK: Normal range of motion.   Neurological: Alert, attentive  Skin: Skin is warm and dry.   Deep sacral wound with granulation tissue and possible purulent tissue, foul smell and purulent foul smell to wet-to-dry bandages removed for inspection.  Right hip pressure ulcer bandage not removed at this time      Emergency Department Course "     Results for orders placed or performed during the hospital encounter of 05/08/23 (from the past 24 hour(s))   iStat Gases (lactate) venous, POCT   Result Value Ref Range    Lactic Acid POCT 1.0 <=2.0 mmol/L    Bicarbonate Venous POCT 14 (L) 21 - 28 mmol/L    O2 Sat, Venous POCT 67 (L) 94 - 100 %    pCO2V Venous POCT 27 (L) 40 - 50 mm Hg    pH Venous POCT 7.34 7.32 - 7.43    pO2 Venous POCT 36 25 - 47 mm Hg   CBC + differential    Narrative    The following orders were created for panel order CBC + differential.  Procedure                               Abnormality         Status                     ---------                               -----------         ------                     CBC with platelets and d...[[id number]]  Abnormal            Final result                 Please view results for these tests on the individual orders.   Basic metabolic panel (BMP)   Result Value Ref Range    Sodium 134 (L) 136 - 145 mmol/L    Potassium 4.8 3.4 - 5.3 mmol/L    Chloride 106 98 - 107 mmol/L    Carbon Dioxide (CO2) 14 (L) 22 - 29 mmol/L    Anion Gap 14 7 - 15 mmol/L    Urea Nitrogen 44.4 (H) 8.0 - 23.0 mg/dL    Creatinine 1.23 (H) 0.51 - 0.95 mg/dL    Calcium 8.9 8.8 - 10.2 mg/dL    Glucose 174 (H) 70 - 99 mg/dL    GFR Estimate 47 (L) >60 mL/min/1.73m2   CBC with platelets and differential   Result Value Ref Range    WBC Count 10.9 4.0 - 11.0 10e3/uL    RBC Count 3.05 (L) 3.80 - 5.20 10e6/uL    Hemoglobin 8.1 (L) 11.7 - 15.7 g/dL    Hematocrit 28.0 (L) 35.0 - 47.0 %    MCV 92 78 - 100 fL    MCH 26.6 26.5 - 33.0 pg    MCHC 28.9 (L) 31.5 - 36.5 g/dL    RDW 22.0 (H) 10.0 - 15.0 %    Platelet Count 473 (H) 150 - 450 10e3/uL    % Neutrophils 75 %    % Lymphocytes 14 %    % Monocytes 10 %    % Eosinophils 1 %    % Basophils 0 %    % Immature Granulocytes 0 %    NRBCs per 100 WBC 0 <1 /100    Absolute Neutrophils 8.1 1.6 - 8.3 10e3/uL    Absolute Lymphocytes 1.5 0.8 - 5.3 10e3/uL    Absolute Monocytes 1.1 0.0 - 1.3 10e3/uL     Absolute Eosinophils 0.1 0.0 - 0.7 10e3/uL    Absolute Basophils 0.0 0.0 - 0.2 10e3/uL    Absolute Immature Granulocytes 0.0 <=0.4 10e3/uL    Absolute NRBCs 0.0 10e3/uL   Hepatic panel   Result Value Ref Range    Protein Total 7.0 6.4 - 8.3 g/dL    Albumin 2.3 (L) 3.5 - 5.2 g/dL    Bilirubin Total 0.3 <=1.2 mg/dL    Alkaline Phosphatase 773 (H) 35 - 104 U/L     (H) 10 - 35 U/L    ALT 94 (H) 10 - 35 U/L    Bilirubin Direct <0.20 0.00 - 0.30 mg/dL   Procalcitonin   Result Value Ref Range    Procalcitonin 0.42 (H) <0.05 ng/mL   US Abdomen Limited    Narrative    EXAM: US ABDOMEN LIMITED  LOCATION: [facility]  DATE/TIME: 5/8/2023 6:52 PM CDT    INDICATION: RUQ pain  COMPARISON: CT 01/03/2023  TECHNIQUE: Limited abdominal ultrasound.    FINDINGS:    GALLBLADDER: The gallbladder is distended with a thin wall. Gallbladder sludge. No shadowing stone. The sonographic Mayo sign is negative.    BILE DUCTS: No biliary dilatation. The common duct measures 4 mm.    LIVER: Normal parenchyma with smooth contour. No focal mass.    RIGHT KIDNEY: No hydronephrosis.    PANCREAS: The pancreas is largely obscured by overlying gas.    No ascites.      Impression    IMPRESSION:  1.  Gallbladder sludge without evidence of acute cholecystitis.         *Note: Due to a large number of results and/or encounters for the requested time period, some results have not been displayed. A complete set of results can be found in Results Review.         Emergency Department Course & Assessments:             Interventions:  Medications   piperacillin-tazobactam (ZOSYN) infusion 3.375 g (has no administration in time range)        Consultations/Discussion of Management or Tests:  Discussed the patient with the hospitalist        Disposition:  The patient was admitted to the hospital under the care of Dr. Leiva.     Impression & Plan      Medical Decision Making:  This is a 73-year-old female who presents for evaluation of  rising LFTs and concern for recurrent deep space infection to a right hip and sacral decubitus ulcer.  Examination of the sacral ulcer does support concerns from wound care and nursing home care team that infection is recurrent.  Similarly, procalcitonin is moderately elevated.  Lactic is normal and vital signs of been stable.  Elevated alk phos, which has been rising over the last weeks, could represent bony damage but GI source of biliary obstruction is also considered.  Right upper quadrant ultrasound is reassuring against this, and the patient is not having abdominal pain or nausea (staff do note anorexia).  Patient require admission for concern for sacral ulcer and infection, so the patient will be placed to medicine for plan surgical consultation; defer IV antibiotics and imaging per hospitalist at this time.  Anemia is stable, she has mild hyponatremia, CKD is stable.      Diagnosis:    ICD-10-CM    1. Pressure injury, stage 4, with infection (H)  L89.94     L08.9     sacrum      2. Pressure injury of skin of contiguous region involving back and right hip, unspecified injury stage  L89.40       3. Elevated LFTs  R79.89       4. Chronic kidney disease, unspecified CKD stage  N18.9       5. Hyponatremia  E87.1       6. Anemia, unspecified type  D64.9               Tahir Ho MD  05/08/23 1911

## 2023-05-09 NOTE — CONSULTS
Palliative Care Consultation Note  [facility]      Patient: Cristal Preciado  Date of Admission:  5/8/2023    Requesting Clinician / Team: Dr Leiva  Reason for consult: Goals of care  Decisional support  Patient and family support     Recommendations & Counseling     GOALS OF CARE:     Restorative without limits     ADVANCE CARE PLANNING:    has directive, there are questions and concerns that need to be addressed in the coming days    There is a POLST form which will need update prior to discharge.    Code status: Full Code    MEDICAL MANAGEMENT:   #Pain,acute on chronic     Opioids: oxycodone (Roxicodone) IR     Acetaminophen (Tylenol), PRN    Other medicines for pain: cymbalta     #Protein calorie malnutrition in the context of chronic illness as evidenced by poor nutrient intake and muscle loss    Encourage PO intakes as able     #Constipation,Medication adverse effect  Last documented BM: unknown  Sennoside (Senokot)  Polyethylene glycol (MiraLAX)     PSYCHOSOCIAL/SPIRITUAL:    Family Daughters Valerie and Court, son Camron    Palliative Care will continue to follow. Thank you for the consult and allowing us to aid in the care of Cristal Preciado.    These recommendations have been discussed with medical team and nursing.    Libertad Romano NP  Securely message with [employer] (more info)  Text page via [facility] Paging/Directory       Palliative Summary/HPI     Cristal Preciado is a 73 year old female with a past medical history of DM2, HTN, Hyperlipidemia, CAD, chronic sacral decubitus ulcer, recent sepsis due to osteomyelitis requiring 8 weeks of antibiotics, previous stroke, GERD who presented on 5/8/2023 with concerns for worsening sacral wound.      Today, the patient was seen for:  Goals of care conversation    Palliative Care Summary:   Met with patient at the bedside. Discussed her hospital course. She is unable express her goals of care. She does report that she would like  antibiotics but is unable to say what they are for. She is able to say she is in the hospital.   I introduced our role as an extra layer of support and how we help patients and families dealing with serious, potentially life-limiting illnesses. I explained the composition of the palliative care team.  Palliative care helps patients and families navigate their care while focusing on the whole person; providing emotional, social and spiritual support  Palliative care often assists with symptom management, information sharing about what to expect from the illness, available treatment options and what effect those options may have on the disease course, and provide effective communication and caring support.   Discussed with the following departments on the team about HCA and best interests of the patient, risk management, honoring choices, ethics, medical team, nursing leadership. It was discussed that reasonable medical care is ethically and legally an appropriate plan of care while the surrogate decision maker is addressed. Discussed things that require a consent, will need to be help off unless emergency/urgent situation, Will continue to try to facilitate with the family to get everyone on the same page.    Prognosis, Goals, & Planning:      Functional Status just prior to this current hospitalization:    unable to assess      Prognosis, Goals, and/or Advance Care Planning:    Advance Care Planning Discussion 5/9/2023. ILibertad NP met with daughter Court over the phone today at the hospital to discuss Advance Care Planning. Cristal Preciado does not have decisional capacity  and was not present for this discussion due to onfusion.  Those present were informed of the voluntary nature of this discussion and wished to proceed.  The discussion included: Court reports that she knows her mother the best and that her goals for her mother are to get her better even if that mean surgical intervention  and long term antibiotics.. This discussion began at 1100 and ended at 1130 for a total of 30 minutes.       Code Status was addressed today:     No due to questions about appropriate HCA, will continue with full code and reasonable medical treatments. I did inform the daughter Court via phone that this is the current plan that may change in the future.  left for Valerie      Patient's decision making preferences: not assessed          Patient has decision-making capacity today for complex decisions:Questionable            I have concerns about the patient/family's health literacy today: Unable to assess today    Coping, Meaning, & Spirituality:     Mood, coping, and/or meaning in the context of serious illness were addressed today: No    Would appreciate Spiritual Health Services    Social:   Living situation:SNF, was previously living with truman Yun    Medications:  I have reviewed this patient's medication profile and medications from this hospitalization.     ROS:  Comprehensive ROS is reviewed and is negative except as here & per HPI:     Physical Exam   Vital Signs with Ranges  Temp:  [96.9  F (36.1  C)-97.8  F (36.6  C)] 96.9  F (36.1  C)  Pulse:  [] 107  Resp:  [15-20] 15  BP: ()/(40-85) 92/41  SpO2:  [95 %-100 %] 100 %  0 lbs 0 oz    Exam:  Constitutional: alert and no distress  Head: Normocephalic. No masses, lesions, tenderness or abnormalities  Cardiovascular: negative, RRR  Respiratory: negative, Lungs clear  Gastrointestinal: Abdomen soft, non-tender. BS normal. No masses, organomegaly  : Deferred and chase catheter in place  Skin: sacral wound dressing intace  Psychiatric: mentation appears normal and affect normal/bright      Data reviewed:      Results for orders placed or performed during the hospital encounter of 05/08/23 (from the past 24 hour(s))   Blood Culture Wrist, Left    Specimen: Wrist, Left; Blood   Result Value Ref Range    Culture No growth after 12 hours     iStat Gases (lactate) venous, POCT   Result Value Ref Range    Lactic Acid POCT 1.0 <=2.0 mmol/L    Bicarbonate Venous POCT 14 (L) 21 - 28 mmol/L    O2 Sat, Venous POCT 67 (L) 94 - 100 %    pCO2V Venous POCT 27 (L) 40 - 50 mm Hg    pH Venous POCT 7.34 7.32 - 7.43    pO2 Venous POCT 36 25 - 47 mm Hg   Blood Culture Hand, Left    Specimen: Hand, Left; Blood   Result Value Ref Range    Culture No growth after 12 hours     Narrative    Only an Aerobic Blood Culture Bottle was collected, interpret results with caution.       CBC + differential    Narrative    The following orders were created for panel order CBC + differential.  Procedure                               Abnormality         Status                     ---------                               -----------         ------                     CBC with platelets and d...[[id number]]  Abnormal            Final result                 Please view results for these tests on the individual orders.   Basic metabolic panel (BMP)   Result Value Ref Range    Sodium 134 (L) 136 - 145 mmol/L    Potassium 4.8 3.4 - 5.3 mmol/L    Chloride 106 98 - 107 mmol/L    Carbon Dioxide (CO2) 14 (L) 22 - 29 mmol/L    Anion Gap 14 7 - 15 mmol/L    Urea Nitrogen 44.4 (H) 8.0 - 23.0 mg/dL    Creatinine 1.23 (H) 0.51 - 0.95 mg/dL    Calcium 8.9 8.8 - 10.2 mg/dL    Glucose 174 (H) 70 - 99 mg/dL    GFR Estimate 47 (L) >60 mL/min/1.73m2   CBC with platelets and differential   Result Value Ref Range    WBC Count 10.9 4.0 - 11.0 10e3/uL    RBC Count 3.05 (L) 3.80 - 5.20 10e6/uL    Hemoglobin 8.1 (L) 11.7 - 15.7 g/dL    Hematocrit 28.0 (L) 35.0 - 47.0 %    MCV 92 78 - 100 fL    MCH 26.6 26.5 - 33.0 pg    MCHC 28.9 (L) 31.5 - 36.5 g/dL    RDW 22.0 (H) 10.0 - 15.0 %    Platelet Count 473 (H) 150 - 450 10e3/uL    % Neutrophils 75 %    % Lymphocytes 14 %    % Monocytes 10 %    % Eosinophils 1 %    % Basophils 0 %    % Immature Granulocytes 0 %    NRBCs per 100 WBC 0 <1 /100    Absolute Neutrophils 8.1  1.6 - 8.3 10e3/uL    Absolute Lymphocytes 1.5 0.8 - 5.3 10e3/uL    Absolute Monocytes 1.1 0.0 - 1.3 10e3/uL    Absolute Eosinophils 0.1 0.0 - 0.7 10e3/uL    Absolute Basophils 0.0 0.0 - 0.2 10e3/uL    Absolute Immature Granulocytes 0.0 <=0.4 10e3/uL    Absolute NRBCs 0.0 10e3/uL   Hepatic panel   Result Value Ref Range    Protein Total 7.0 6.4 - 8.3 g/dL    Albumin 2.3 (L) 3.5 - 5.2 g/dL    Bilirubin Total 0.3 <=1.2 mg/dL    Alkaline Phosphatase 773 (H) 35 - 104 U/L     (H) 10 - 35 U/L    ALT 94 (H) 10 - 35 U/L    Bilirubin Direct <0.20 0.00 - 0.30 mg/dL   Procalcitonin   Result Value Ref Range    Procalcitonin 0.42 (H) <0.05 ng/mL   CRP inflammation   Result Value Ref Range    CRP Inflammation 216.40 (H) <5.00 mg/L   US Abdomen Limited    Narrative    EXAM: US ABDOMEN LIMITED  LOCATION: [facility]  DATE/TIME: 5/8/2023 6:52 PM CDT    INDICATION: RUQ pain  COMPARISON: CT 01/03/2023  TECHNIQUE: Limited abdominal ultrasound.    FINDINGS:    GALLBLADDER: The gallbladder is distended with a thin wall. Gallbladder sludge. No shadowing stone. The sonographic Mayo sign is negative.    BILE DUCTS: No biliary dilatation. The common duct measures 4 mm.    LIVER: Normal parenchyma with smooth contour. No focal mass.    RIGHT KIDNEY: No hydronephrosis.    PANCREAS: The pancreas is largely obscured by overlying gas.    No ascites.      Impression    IMPRESSION:  1.  Gallbladder sludge without evidence of acute cholecystitis.       Glucose by meter   Result Value Ref Range    GLUCOSE BY METER POCT 142 (H) 70 - 99 mg/dL   Comprehensive metabolic panel   Result Value Ref Range    Sodium 138 136 - 145 mmol/L    Potassium 4.5 3.4 - 5.3 mmol/L    Chloride 110 (H) 98 - 107 mmol/L    Carbon Dioxide (CO2) 13 (L) 22 - 29 mmol/L    Anion Gap 15 7 - 15 mmol/L    Urea Nitrogen 42.6 (H) 8.0 - 23.0 mg/dL    Creatinine 1.05 (H) 0.51 - 0.95 mg/dL    Calcium 9.0 8.8 - 10.2 mg/dL    Glucose 135 (H) 70 - 99 mg/dL     Alkaline Phosphatase 677 (H) 35 - 104 U/L     (H) 10 - 35 U/L    ALT 77 (H) 10 - 35 U/L    Protein Total 6.7 6.4 - 8.3 g/dL    Albumin 2.2 (L) 3.5 - 5.2 g/dL    Bilirubin Total 0.3 <=1.2 mg/dL    GFR Estimate 57 (L) >60 mL/min/1.73m2   CBC with platelets   Result Value Ref Range    WBC Count 10.3 4.0 - 11.0 10e3/uL    RBC Count 3.28 (L) 3.80 - 5.20 10e6/uL    Hemoglobin 9.0 (L) 11.7 - 15.7 g/dL    Hematocrit 28.6 (L) 35.0 - 47.0 %    MCV 87 78 - 100 fL    MCH 27.4 26.5 - 33.0 pg    MCHC 31.5 31.5 - 36.5 g/dL    RDW 21.7 (H) 10.0 - 15.0 %    Platelet Count 419 150 - 450 10e3/uL     *Note: Due to a large number of results and/or encounters for the requested time period, some results have not been displayed. A complete set of results can be found in Results Review.          Medical Decision Making   Medical Decision Making     240 MINUTES SPENT BY ME on the date of service doing chart review, history, exam, documentation & further activities per the note.      Please see A&P for additional details of medical decision making.  MANAGEMENT DISCUSSED with the following over the past 24 hours: Medical team, nursing   Tests REVIEWED in the past 24 hours:  - See lab/imaging results included in the data section of the note  SUPPLEMENTAL HISTORY, in addition to the patient's history, over the past 24 hours obtained from:   - truman Yun  Medical complexity over the past 24 hours:  - Prescription DRUG MANAGEMENT performed  - goals of care discussion and camila review

## 2023-05-09 NOTE — PROGRESS NOTES
Pt daughter was present for shift change but gone shortly after, unannounced.     Pt offered water - drank well    Pt was repositioned but became verbally upset and was positioned back to original side. Pt is disorientated x4    Care conference to be conducted at 1300

## 2023-05-09 NOTE — PROGRESS NOTES
[facility] Care Coordination Contact    [facility]  Ambulatory Care Coordination to Inpatient Care Management   Hand-In Communication    Date:  May 9, 2023  Name: Cristal Preciado is enrolled in [facility] Care Coordination program and I am the Lead Care Coordinator.  CC Contact Information:.   Payor Source: Payor: [facility] / Plan: [facility] DUAL / Product Type: HMO /   Current services in place:     Please see the CC Snaphot and Care Management Flowsheets for specific  details of this Cristal Preciado care plan.   Additional details/specific concerns r/t this admission:    No additional concerns at this time Member is new to this care coordinator. See notes from any previous admissions.     I will follow this admission in Epic. Please feel free to contact me with questions or for further collaboration in discharge planning.    Laine Torres RN, PHN  Intuitional Care Coordinator [facility]  Cell [phone] Fax [phone]

## 2023-05-09 NOTE — PROGRESS NOTES
[facility]    Medicine Progress Note - Hospitalist Service    Date of Admission:  5/8/2023    Assessment & Plan     Cristal Preciado is a 73 year old female admitted on 5/8/2023. She has a past medical history significant for diabetes mellitus type 2, hypertension, hyperlipidemia, coronary artery disease, chronic sacral decubitus ulcer, recent sepsis due to osteomyelitis requiring 8 weeks of antibiotics, previous stroke, GERD.  She was sent to emergency room due to concern for possible worsening of her chronic sacral wound.     Acute worsening of chronic sacral pressure wound.  Chronic right hip wound.  Chronic bilateral heel ulcers.  -Hospitalized middle of February for sepsis due to sacral osteomyelitis and possible necrotizing fasciitis.  -Completed 8 weeks of antibiotics in the middle of April.  -Has been at TCU.  -Staff at TCU concerned that sacral wound might be getting worse.  -Check CT pelvis.  -CRP elevated at greater than 200.  -Appreciate infectious disease consult.  -Appreciate orthopedic surgery consult.  -Appreciate wound nurse consult.  -Start IV Zosyn.  -Likely, will need transfer to facility with plastic surgery.  -Family currently with differences of opinion on treatment plan going forward.  -Appreciate ethics consult and palliative care consult.     Diabetes mellitus type 2.  -Had recently been taking off scheduled insulin due to episodes of hypoglycemia.  -Metformin stopped.  -Continue aspart insulin sliding scale as needed.     Hypertension.  Hyperlipidemia.  Coronary artery disease.  Previous stroke.  -Has been mildly hypotensive here at times.  -Hold metoprolol.  -Restart aspirin 81 mg a day.  -Restart atorvastatin 80 mg a day.     Chronic kidney disease stage IIIa.  -Creatinine appears to be at baseline.  -Avoid nephrotoxins as able.  -Creatinine stable today at 1.1.  -Recheck metabolic panel tomorrow.     Transaminitis.  -Mild.  -Abdominal ultrasound shows gallbladder  sludge.  -Recheck LFTs intermittently.     Chronic anemia.  -Hemoglobin mildly lower than recent baseline.  -No known acute hemorrhage.  -Hemoglobin stable today at 9.  -Recheck CBC intermittently.    GERD.  -Restart pantoprazole 40 mg a day.       Diet: Regular Diet Adult    DVT Prophylaxis: Pneumatic Compression Devices  Cornejo Catheter: Not present  Lines: None     Cardiac Monitoring: None  Code Status: Full Code      Clinically Significant Risk Factors Present on Admission           # Hypercalcemia: corrected calcium is >10.1, will monitor as appropriate    # Hypoalbuminemia: Lowest albumin = 1.5 g/dL at 5/8/2023  7:00 AM, will monitor as appropriate         # DMII: A1C = N/A within past 6 months            Disposition Plan      Expected Discharge Date: 05/12/2023                  Andrade Leiva DO  Hospitalist Service  [facility]  Securely message with [employer] (more info)  Text page via [facility] Paging/Directory   ______________________________________________________________________    Interval History   Having some pain on buttocks.  Denies chest pain, shortness of breath, fevers, chills, nausea, or vomiting.    Physical Exam   Vital Signs: Temp: 96.9  F (36.1  C) Temp src: Temporal BP: 92/41 Pulse: 107   Resp: 15 SpO2: 100 % O2 Device: None (Room air)    Weight: 0 lbs 0 oz    Gen:  NAD, A&O x1 to person.  Not oriented to time or place.  Eyes:  PERRL, sclera anicteric.  OP:  MMM, no lesions.  Neck:  Supple.  CV:  Regular, no murmurs.  Lung:  CTA b/l, normal effort.  Ab:  +BS, soft.  Skin:  Warm, dry to touch.  Sacrum and right hip wounds.  Bandage not removed from right hip wound today.  Sacral wound with significant drainage.  Ext:  No pitting edema LE b/l.      Medical Decision Making       53 MINUTES SPENT BY ME on the date of service doing chart review, history, exam, documentation & further activities per the note.      Data     I have personally reviewed the following data over  the past 24 hrs:    10.3  \   9.0 (L)   / 419     138 110 (H) 42.6 (H) /  135 (H)   4.5 13 (L) 1.05 (H) \       ALT: 77 (H) AST: 164 (H) AP: 677 (H) TBILI: 0.3   ALB: 2.2 (L) TOT PROTEIN: 6.7 LIPASE: N/A       Procal: N/A CRP: N/A Lactic Acid: N/A         Imaging results reviewed over the past 24 hrs:   Recent Results (from the past 24 hour(s))   US Abdomen Limited    Narrative    EXAM: US ABDOMEN LIMITED  LOCATION: [facility]  DATE/TIME: 5/8/2023 6:52 PM CDT    INDICATION: RUQ pain  COMPARISON: CT 01/03/2023  TECHNIQUE: Limited abdominal ultrasound.    FINDINGS:    GALLBLADDER: The gallbladder is distended with a thin wall. Gallbladder sludge. No shadowing stone. The sonographic Mayo sign is negative.    BILE DUCTS: No biliary dilatation. The common duct measures 4 mm.    LIVER: Normal parenchyma with smooth contour. No focal mass.    RIGHT KIDNEY: No hydronephrosis.    PANCREAS: The pancreas is largely obscured by overlying gas.    No ascites.      Impression    IMPRESSION:  1.  Gallbladder sludge without evidence of acute cholecystitis.

## 2023-05-09 NOTE — PROGRESS NOTES
VAT RN called to obtain PIV access for IVF and abx. VAT RN tried multiple attempts to obtain PIV without success.  No adequate veins found in lower fa for PIV access. VAT RN paged MD to discuss vascular needs further. VAT recommends placing midline for IV access however pt has pending blood cultures and abx plan is unknown at this time. Per charge RN PICC stat was onsite last evening and family was refusing PICC line placement, however no VAT consult order or PICC line placement order found from last evening. Bedside RN notified waiting on call back from MD.

## 2023-05-09 NOTE — PROVIDER NOTIFICATION
Provider notified that Pt has order for PICC to be placed and NS 75 ml/ hr. PICC RN on unit, daughter refusing PICC placement and any medical care until they speak to the doctor in the morning. Charge nurse and ANS aware.     Call back from Dr. Copeland- no new orders.

## 2023-05-09 NOTE — PHARMACY-ADMISSION MEDICATION HISTORY
Pharmacist Admission Medication History    Admission medication history is complete. The information provided in this note is only as accurate as the sources available at the time of the update.    Medication reconciliation/reorder completed by provider prior to medication history? No    Information Source(s): Facility ([facility]/) medication list/MAR via N/A    Pertinent Information: Lantus has been held past few days due to BG < 120    Changes made to PTA medication list:    Added: Oxycodone 5 mg qam and q6h prn pain    Deleted: Lovenox for VTE ppx ended on 5/1, Patanol drops    Changed: Metformin 750 mg bid to 500 mg bid    Medication Affordability:  Not including over the counter (OTC) medications, was there a time in the past 12 months when you did not take your medications as prescribed because of cost?: Unable to Assess    Allergies reviewed with patient and updates made in EHR: unable to assess    Medication History Completed By: Paris Macedo RPH 5/9/2023 10:25 AM    Prior to Admission medications    Medication Sig Last Dose Taking? Auth Provider Long Term End Date   acetaminophen (TYLENOL) 500 MG tablet Take 1,000 mg by mouth 3 times daily 5/8/2023 Yes Reported, Patient No    aspirin (ASPIRIN LOW DOSE) 81 MG chewable tablet 1 tablet (81 mg) by Oral or NG Tube route daily 5/8/2023 Yes Manuel Bennett MD No    diclofenac (VOLTAREN) 1 % topical gel Apply 2 g topically 4 times daily 5/8/2023 Yes Manuel Bennett MD No    diphenhydrAMINE (BENADRYL) 25 MG capsule Take 1 capsule (25 mg) by mouth every 6 hours as needed for itching Unknown Yes Manuel Bennett MD     dorzolamide-timolol (COSOPT) 2-0.5 % ophthalmic solution Place 1 drop into both eyes 2 times daily 5/8/2023 at am Yes Jessica Ramsay MD     DULoxetine (CYMBALTA) 30 MG capsule 3 capsules (90 mg) by Oral or NG Tube route every morning 5/8/2023 Yes Manuel Bennett MD Yes    insulin aspart (NOVOLOG PEN) 100 UNIT/ML  pen Inject 1-10 Units Subcutaneous 3 times daily (before meals)  Patient taking differently: Inject 2-12 Units Subcutaneous 3 times daily as needed 1) Sliding scale  Novolog TID before meals:  BS <200 give               0 units  -249                 2 units  -299                 4 units  -349                 6 units  -399                 8 units  -450                 10 units  BS>450                       12 units Unknown Yes Manuel Bennett MD Yes    insulin glargine (LANTUS PEN) 100 UNIT/ML pen Inject 6 Units Subcutaneous At Bedtime Hold for blood glucose less than 120 Unknown at held past few days for bg<120 Yes Reported, Patient No    melatonin 1 MG TABS tablet Take 1 tablet (1 mg) by mouth nightly as needed for sleep Unknown at prn Yes Manuel Bennett MD     metFORMIN (GLUCOPHAGE) 500 MG tablet Take 500 mg by mouth 2 times daily (with meals) Hold for BG < 120 5/7/2023 Yes Unknown, Entered By History No    methocarbamol (ROBAXIN) 500 MG tablet Take 1 tablet (500 mg) by mouth 4 times daily as needed for muscle spasms Unknown at prn Yes Manuel Bennett MD     multivitamin w/minerals (THERA-VIT-M) tablet 1 tablet by Oral or Feeding Tube route daily 5/8/2023 Yes Manuel Bennett MD     nitroGLYCERIN (NITROSTAT) 0.4 MG SL tablet Place 0.4 mg under the tongue every 5 minutes as needed. Unknown Yes Reported, Patient     oxyCODONE (ROXICODONE) 5 MG tablet Take 5 mg by mouth every 6 hours as needed for pain (from pressure wounds) 5/3/2023 at 0200 Yes Unknown, Entered By History     oxyCODONE (ROXICODONE) 5 MG tablet Take 5 mg by mouth every morning 5/8/2023 at AM Yes Unknown, Entered By History     pantoprazole (PROTONIX) 40 MG EC tablet Take 1 tablet (40 mg) by mouth every morning (before breakfast) 5/8/2023 Yes Manuel Bennett MD     polyvinyl alcohol (LIQUIFILM TEARS) 1.4 % ophthalmic solution Place 1 drop into both eyes 3 times daily  5/8/2023 Yes Reported, Patient     senna-docusate (SENOKOT-S/PERICOLACE) 8.6-50 MG tablet Take 2 tablets by mouth 2 times daily as needed for constipation Unknown Yes Lisa Stevens APRN CNP     simethicone (MYLICON) 80 MG chewable tablet 1 tablet (80 mg) by Oral or NG Tube route 4 times daily as needed Unknown Yes aMnuel Bennett MD No    atorvastatin (LIPITOR) 80 MG tablet 1 tablet (80 mg) by Oral or NG Tube route every evening 5/7/2023 at pm  Manuel Bennett MD Yes    latanoprost (XALATAN) 0.005 % ophthalmic solution Place 1 drop into both eyes At Bedtime 5/7/2023 at hs  Jessica Ramsay MD Yes    melatonin 3 MG tablet Take 3 mg by mouth At Bedtime 5/7/2023 at hs  Unknown, Entered By History     metoprolol tartrate (LOPRESSOR) 25 MG tablet Take 12.5 mg by mouth 2 times daily Hold for SBP less than 100 or HR less than 55 5/7/2023 at am  Reported, Patient No    mirtazapine (REMERON) 15 MG tablet 1 tablet (15 mg) by Oral or NG Tube route At Bedtime 5/7/2023 at hs  Manuel Bennett MD Yes    traZODone (DESYREL) 50 MG tablet 1 tablet (50 mg) by Oral or NG Tube route At Bedtime 5/7/2023  Manuel Bennett MD Yes

## 2023-05-09 NOTE — PROGRESS NOTES
TRANSITIONS OF CARE (JOSE D) LOG   JOSE D tasks should be completed by the CC within one (1) business day of notification of each transition. Follow up contact with member is required after return to their usual care setting.  Note:  If CC finds out about the transitions fifteen (15) days or more after the member has returned to their usual care setting, no JOSE D log is needed. However, the CC should check in with the member to discuss the transition process, any changes needed to the care plan and document it in a case note.    Member Name:  Cristal Preciado [facility] Name:  Prashant O/Health Plan Member ID#: [id number]   Product: [facility] Care Coordinator Contact:  Laine Torres RN, PHN Agency/County/Care System: [facility]   Transition Communication Actions from Care Management Contact   Transition #1   Notification Date: 23 Transition Date:   23 Transition From: Nursing Home, [facility]     Is this the member s usual care setting?               yes Transition To: [facility]   Transition Type:  Unplanned  Reason for Admission/Comments:  Worsening sacral wound  Sepsis  Contact member/responsible party to offer assistance with transition Date completed: 23    Notes from conversation with the member/responsible party, provider, discharging and receiving facility (as applicable):   Date 23:CC contacted Hospital /discharge planner via the [employer] Transitional Care Hand-In Process, with community care plan included.  CC reached out to this care coordinator and adult daughter Mary regarding transition and offered support as needed.  Reviewed and update care plan as needed.  Notified community service providers and placed services None on hold as needed.  Transition log initiated.   PCP, Dayna Marie, notified of hospitalization via EMR.    Patient  while inpatient at [facility]. Family was notified by hospital staff. Care  Coordinator reached out to facility to make sure they were notified.     Laine Torres RN, PHN  Intuitional Care Coordinator [facility]  Cell [phone] Fax [phone]       Shared CC contact info, care plan/services with receiving setting--Date completed: 23   Name & Title of receiving setting contact: [facility]   Notified PCP of transition--Date completed:  23     via  EMR  Name of PCP: Dayna Marie     Member  while inpatient at [facility]. Family and facility have been notified. Care Coordinator will complete disenrollment process for date of 23    Laine Torres RN, PHN  Intuitional Care Coordinator [facility]  Cell [phone] Fax [phone]

## 2023-05-09 NOTE — ED NOTES
[facility]  ED Nurse Handoff Report    ED Chief complaint: Wound Infection  . ED Diagnosis:   Final diagnoses:   Pressure injury, stage 4, with infection (H) - sacrum   Pressure injury of skin of contiguous region involving back and right hip, unspecified injury stage   Elevated LFTs   Chronic kidney disease, unspecified CKD stage   Hyponatremia   Anemia, unspecified type       Allergies:   Allergies   Allergen Reactions     Dust Mites Other (See Comments)     Sneezing runny eyes and nose.     Other Environmental Allergy Hives     Mountain Dew     Pollen Extract Other (See Comments)     Sneezing runny eyes and nose.     Walnuts [Nuts] Hives       Code Status: Full Code    Activity level - Baseline/Home:  Up with assist. Unknown what level of assistance needed, likely x2 assist.   Activity Level - Current:   Advance activity as tolerated.   Lift room needed: No.   Bariatric: No   Needed: No   Isolation: No.   Infection: Not Applicable.     Respiratory status: Room air    Vital Signs (within 30 minutes):   Vitals:    05/08/23 1700 05/08/23 1715 05/08/23 1730 05/08/23 1800   BP: 92/54 91/61 103/62 110/81   Pulse:   99 50   Resp:       Temp:       TempSrc:       SpO2: 100%  98% 98%   Height:           Cardiac Rhythm:  ,      Pain level:    Patient confused: No.   Patient Falls Risk: arm band in place.   Elimination Status: Has voided     Patient Report - Initial Complaint: Pt arrives via EMS from [facility] w/ complaints of Stage 4 pressure ulcer on coccyx presenting w/ increased erythema and oozing puss. Pt also is reported to have another pressure ulcer on her hip  Focused Assessment: Skin WDL Skin WDL: .WDL except; characteristics  Skin Integrity: other (see comments)  Other (see comments): Sacral wound, ongoing with increasing concern for infection from facility staff.  Skin Comments: Erythema present surrounding pressure ulcer on sacrum, purulent drainage present as well. New  mepilex dressing applied by previous RN Jessica JOSEP at approximately 1715.     Abnormal Results:   Labs Ordered and Resulted from Time of ED Arrival to Time of ED Departure   BASIC METABOLIC PANEL - Abnormal       Result Value    Sodium 134 (*)     Potassium 4.8      Chloride 106      Carbon Dioxide (CO2) 14 (*)     Anion Gap 14      Urea Nitrogen 44.4 (*)     Creatinine 1.23 (*)     Calcium 8.9      Glucose 174 (*)     GFR Estimate 47 (*)    CBC WITH PLATELETS AND DIFFERENTIAL - Abnormal    WBC Count 10.9      RBC Count 3.05 (*)     Hemoglobin 8.1 (*)     Hematocrit 28.0 (*)     MCV 92      MCH 26.6      MCHC 28.9 (*)     RDW 22.0 (*)     Platelet Count 473 (*)     % Neutrophils 75      % Lymphocytes 14      % Monocytes 10      % Eosinophils 1      % Basophils 0      % Immature Granulocytes 0      NRBCs per 100 WBC 0      Absolute Neutrophils 8.1      Absolute Lymphocytes 1.5      Absolute Monocytes 1.1      Absolute Eosinophils 0.1      Absolute Basophils 0.0      Absolute Immature Granulocytes 0.0      Absolute NRBCs 0.0     ISTAT GASES LACTATE VENOUS POCT - Abnormal    Lactic Acid POCT 1.0      Bicarbonate Venous POCT 14 (*)     O2 Sat, Venous POCT 67 (*)     pCO2V Venous POCT 27 (*)     pH Venous POCT 7.34      pO2 Venous POCT 36     HEPATIC FUNCTION PANEL - Abnormal    Protein Total 7.0      Albumin 2.3 (*)     Bilirubin Total 0.3      Alkaline Phosphatase 773 (*)      (*)     ALT 94 (*)     Bilirubin Direct <0.20     PROCALCITONIN - Abnormal    Procalcitonin 0.42 (*)    BLOOD CULTURE   BLOOD CULTURE        US Abdomen Limited   Final Result   IMPRESSION:   1.  Gallbladder sludge without evidence of acute cholecystitis.                Treatments provided: see MAR, at this time no IV access; Multiple attempts at PIC including US IV, PICC RN contacted for insertion, MD aware.   Family Comments: son and daughter at bedside, there has been some confusion regarding patient's healthcare directive and what family  members are allowed medical information, registration contacted to assist in clearing up this issue.   OBS brochure/video discussed/provided to patient:  No  ED Medications: Medications - No data to display    Drips infusing:  No  For the majority of the shift this patient was Green.   Interventions performed were n/a.    Sepsis treatment initiated: No    Cares/treatment/interventions/medications to be completed following ED care:     ED Nurse Name: Ailyn Sandhu RN  7:18 PM  RECEIVING UNIT ED HANDOFF REVIEW    Above ED Nurse Handoff Report was reviewed: Yes  Reviewed by: Selene Rizzo RN on May 8, 2023 at 8:55 PM

## 2023-05-09 NOTE — PLAN OF CARE
Goal Outcome Evaluation:      Plan of Care Reviewed With: patient, child    Overall Patient Progress: no change    Patient is alert, spontaneous eye opening and speech. Daughter at bedside- daughter is refusing nursing assessment and cares  Refused PICC line placement.  Transfers with lift. Pressure injuries to sacrum and bilateral heels- ROXANN due to daughter refusal. Heel protectors in place. Chase catheter- urine dark brown in appearance. ROXANN chase site. Daughter states she does not want anything done with her mother until she speaks to a doctor in the morning. Informed daughter to let nursing staff know if she changes her mind or if pt needs anything. Daughter verbalized understanding. Provider paged - no new orders.

## 2023-05-09 NOTE — CONSULTS
[facility]  WO Nurse Inpatient Assessment     Consulted for: Sacrum, Right hip, Bilateral heels    Summary: Sacral wound with copious green purulent malodorous drainage and exposed bone.  May need transfer to higher level of care for management as general surgery generally requests plastics involvement when bone is exposed.  Orthopedics wrote note regarding sacral wound.  Unknown if they are aware of right hip wound.  Would consider re-consulting ortho for right hip wound (especially if not transferring facilities), wound has copious malodorous green purulent drainage.      Patient History (according to provider note(s):      Cristal Preciado is a 73 year old female admitted on 5/8/2023. She has a past medical history significant for diabetes mellitus type 2, hypertension, hyperlipidemia, coronary artery disease, chronic sacral decubitus ulcer, recent sepsis due to osteomyelitis requiring 8 weeks of antibiotics, previous stroke, GERD.  She was sent to emergency room due to concern for possible worsening of her chronic sacral wound.    Assessment:      Areas visualized during today's visit: Focused: Multiple wounds    Pressure Injury Location: Sacrum  Last photo: 5/9/23    Wound type: Pressure Injury     Pressure Injury Stage: 4, present on admission   Wound history/plan of care:   Patient with chronic pressure injury.  Patient's family was refusing any cares to site.  Was previously using wound VAC to site.  Wound base: 30 % pink non-granular tissue, 65 % slough, 5% exposed bone     Palpation of the wound bed: normal      Drainage: copious     Description of drainage: purulent and green     Measurements (length x width x depth, in cm) 11  x 6  x  2 cm      Tunneling N/A     Undermining up to 4 cm from 10-12 o'clock  Periwound skin: Scar tissue      Color: normal and consistent with surrounding tissue      Temperature: normal   Odor: strong  Pain: mild  Pain intervention prior to dressing change:  slow and gentle cares   Treatment goal: Heal  and Remove necrotic tissue  STATUS: initial assessment  Supplies ordered: ordered Vashe    Pressure Injury Location: Right hip  Last photo: 5/9/23    Wound type: Pressure Injury     Pressure Injury Stage: 4, present on admission   Wound history/plan of care:   Patient with chronic pressure injury.  Patient's family was refusing any cares to site.  Was previously using wound VAC to site.  Wound base: 40 % granulation tissue and non-granular tissue, 60 % slough     Palpation of the wound bed: normal      Drainage: copious     Description of drainage: purulent and green     Measurements (length x width x depth, in cm) 12  x 10  x  0.5 cm      Tunneling N/A     Undermining N/A  Periwound skin: Intact      Color: normal and consistent with surrounding tissue      Temperature: normal   Odor: strong  Pain: mild  Pain intervention prior to dressing change: N/A  Treatment goal: Heal  and Remove necrotic tissue  STATUS: initial assessment  Supplies ordered: ordered Vashe    Pressure Injury Location: Bilateral heels  Last photo: 5/9/23  Left      Right    Wound type: Pressure Injury     Pressure Injury Stage: Unstageable, present on admission   Wound history/plan of care:   Chronic heel pressure injuries.   Wound base: 100 % eschar     Palpation of the wound bed: firm      Drainage: none     Description of drainage: none     Measurements (length x width x depth, in cm) Left heel is 4x5.5cm, Right heel is 3x2cm     Tunneling N/A     Undermining N/A  Periwound skin: Dry/scaly      Color: normal and consistent with surrounding tissue      Temperature: normal   Odor: none  Pain: denies   Pain intervention prior to dressing change: N/A  Treatment goal: Keep stable  STATUS: initial assessment  Supplies ordered: at bedside    Wound location: Right lateral 5th toe  Last photo: 5/9/23    Wound due to: Trauma vs pressure injury  Wound history/plan of care: Unknown injury  Wound base: 100 %  "dry drainage     Palpation of the wound bed: firm      Drainage: none     Description of drainage: none     Measurements (length x width x depth, in cm): 0.7  x 0.6  x  0.1 cm      Tunneling: N/A     Undermining: N/A  Periwound skin: Intact      Color: normal and consistent with surrounding tissue      Temperature: normal   Odor: none  Pain: denies   Pain interventions prior to dressing change: N/A  Treatment goal: Heal   STATUS: initial assessment  Supplies ordered: at bedside           Treatment Plan:     Right hip and sacral wound(s): BID   1. Cleanse with Vashe ([facility]#[id number])  2. Pack with Vashe moistened kerlix  3. Cover with ABD     Bilateral heels and right foot wound(s): Leave open to air  1. Float heels at all times with offloading boots    Pressure Injury Prevention (PIP) Plan:  If patient is declining pressure injury prevention interventions: Explore reason why and address patient's concerns, Educate on pressure injury risk and prevention intervention(s) and If patient is still declining, document \"informed refusal\"   Mattress: Follow bed algorithm, reassess daily and order specialty mattress, if indicated.  HOB: Maintain at or below 30 degrees, unless contraindicated  Repositioning in bed: Every 1-2 hours   Heels: Heel lift boots  Moisture Management: Avoid brief in bed  Under Devices: Inspect skin under all medical devices during skin inspection , Ensure tubes are stabilized without tension and Ensure patient is not lying on medical devices or equipment when repositioned  Ask provider to discontinue device when no longer needed.       Orders: Written    RECOMMEND PRIMARY TEAM ORDER: Consider transfer-see above  Education provided: importance of repositioning, plan of care and Off-loading pressure  Discussed plan of care with: Patient, Nurse and Physician  WOC nurse follow-up plan: 1-2 times a week  Notify WOC if wound(s) deteriorate.  Nursing to notify the Provider(s) and re-consult the WOC Nurse if new " skin concern.    DATA:     Current support surface: Standard  Standard gel/foam mattress (IsoFlex, Atmos air, etc)-start pulsate  Containment of urine/stool: Incontinence Protocol  BMI: Body mass index is 20.36 kg/m .   Active diet order: Orders Placed This Encounter      Regular Diet Adult     Output: I/O last 3 completed shifts:  In: -   Out: 500 [Urine:500]     Labs: Recent Labs   Lab 05/09/23  1055   ALBUMIN 2.2*   HGB 9.0*   WBC 10.3     Pressure injury risk assessment:                          MOSES Denis  [facility] [employer]  Dept. Office Number: [phone]

## 2023-05-09 NOTE — CONSULTS
ID consult dictated IMP 1 72 yo chronic decubitis, presume osteo, prior extended ABX , worsening    REC Minimal ABX role alone, same for now ? Iand d , some ABX and WHI see Dr Bonner

## 2023-05-09 NOTE — PLAN OF CARE
"Goal Outcome Evaluation:     Patient came in at 2200 from ED. VS are stable. RA Sating in high 90's. Blood glucose check QID/HS. BG @2237 was 142. No coverage needed. Son and Daughter Valerie at the bed side interfering with care. Initially, When we try to remove transferring mat under patient; she was screaming in pain. Writer brought oxycodone per patient request and Pt daughter stated, \" she doesn't need pain medication; she is been dragged. Needs toxicology test..\" Patient then stated that \" I don't have pain and I don't need any medication.\" Son left the facility. Daughter at the bed side. Patient was turned to her left side. Has Cornejo cath. Requires lift for transfers. NPO after midnight. PICC stat will be here at 2330 to place PICC. We will continue to monitor.                      "

## 2023-05-09 NOTE — CONSULTS
"Consult for sacral decubitus ulcer    Per medical records:  Cristal Preciado is a 73 year old female who is chronically debilitated and bedbound.  She was hospitalized in the middle of February of this year due to sacral ulcer that became infected.  She developed sepsis due to osteomyelitis and suspected necrotizing fasciitis.  She required surgery by general surgery at [facility] (Dr Ragsdale) for concerns of necrotizing fasciitis.  She was on IV Zosyn for 4 weeks in the hospital.  She was then discharged with an additional 4 weeks of oral antibiotics on Augmentin.  She has been at a transitional care unit since discharge in the middle of April.  She was sent to emergency room today from her TCU due to staff concern that sacral wound seems to be getting worse.  Patient does have chronic sacral pain.     BP 92/41 (BP Location: Right arm)   Pulse 107   Temp 96.9  F (36.1  C) (Temporal)   Resp 15   Ht 1.702 m (5' 7\")   SpO2 100%   BMI 20.36 kg/m      Plan:  Orthopedic service does not debride sacral/pelvis ulcers.   Continue Abx per ID recommendation  Defer wound cares to WOC recommendation  Await palliative care consult.   If surgical intervention is required and pending palliative consult/goals, recommend transfer to facility where tertiary cares are available.     Evie Marquez PA-C on 5/9/2023 at 12:22 PM    "

## 2023-05-09 NOTE — PROGRESS NOTES
Call back from Dr. Roberson regarding inability for VAT to obtain PIV access. Order for midline received. PICC stat notified.  See flowsheet and procedure notes for additional details.

## 2023-05-09 NOTE — H&P
[facility]    History and Physical - Hospitalist Service       Date of Admission:  5/8/2023    Assessment & Plan      Cristal Preciado is a 73 year old female admitted on 5/8/2023. She has a past medical history significant for diabetes mellitus type 2, hypertension, hyperlipidemia, coronary artery disease, chronic sacral decubitus ulcer, recent sepsis due to osteomyelitis requiring 8 weeks of antibiotics, previous stroke, GERD.  She was sent to emergency room due to concern for possible worsening of her chronic sacral wound.    Possible acute worsening of chronic sacral wound.  -Hospitalized middle of February for sepsis due to sacral osteomyelitis and possible necrotizing fasciitis.  -Completed 8 weeks of antibiotics in the middle of April.  -Has been at TCU.  -Staff at TCU concerned that sacral wound might be getting worse.  -Check CT pelvis.  -Check CRP.  -Infectious disease consult.  -Orthopedic surgery consult.  -Wound nurse consult.  -Not febrile.  -No leukocytosis.  -Hold off on antibiotics pending above testing and consults.    Chronic pressure ulcers.  -Present on admission.  -Sacrum and bilateral heels.  -Wound nurse consult.    Diabetes mellitus type 2.  -Had recently been taking off scheduled insulin due to episodes of hypoglycemia.  -Aspart insulin sliding scale as needed.    Hypertension.  Hyperlipidemia.  Coronary artery disease.  Previous stroke.  -Likely restart some of home medications once verified by pharmacy.    Chronic kidney disease stage IIIa.  -Creatinine appears to be at baseline.  -Avoid nephrotoxins as able.  -Recheck metabolic panel tomorrow.    Transaminitis.  -Mild.  -Abdominal ultrasound shows gallbladder sludge.  -Recheck LFTs intermittently.    Chronic anemia.  -Hemoglobin mildly lower than recent baseline.  -No known acute hemorrhage.  -Recheck CBC tomorrow.         Diet: Regular Diet Adult  NPO for Medical/Clinical Reasons Except for: No Exceptions    DVT  Prophylaxis: Pneumatic Compression Devices  Cornejo Catheter: Not present  Lines: PRESENT             Cardiac Monitoring: None  Code Status: Full Code      Clinically Significant Risk Factors Present on Admission           # Hypercalcemia: corrected calcium is >10.1, will monitor as appropriate    # Hypoalbuminemia: Lowest albumin = 1.5 g/dL at 5/8/2023  7:00 AM, will monitor as appropriate  # Drug Induced Coagulation Defect: home medication list includes an anticoagulant medication   # Acute Kidney Injury, unspecified: based on a >150% or 0.3 mg/dL increase in last creatinine compared to past 90 day average, will monitor renal function      # DMII: A1C = N/A within past 6 months            Disposition Plan      Expected Discharge Date: 05/10/2023                  Andrade Leiva DO  Hospitalist Service  [facility]  Securely message with [employer] (more info)  Text page via [facility] Paging/Directory     ______________________________________________________________________    Chief Complaint   Buttocks pain.    History is obtained from the patient and daughter at bedside.    History of Present Illness   Cristal Preciado is a 73 year old female who has a past medical history significant for diabetes mellitus type 2, hypertension, hyperlipidemia, coronary artery disease, chronic sacral decubitus ulcer, recent sepsis due to osteomyelitis requiring 8 weeks of antibiotics, previous stroke, GERD.  She is chronically debilitated and bedbound.  She was hospitalized in the middle of February of this year due to sacral ulcer that became infected.  She developed sepsis due to osteomyelitis and suspected necrotizing fasciitis.  She required surgery by orthopedic surgery for concerns of necrotizing fasciitis.  She was on IV Zosyn for 4 weeks in the hospital.  She was then discharged with an additional 4 weeks of oral antibiotics on Augmentin.  She has been at a transitional care unit since discharge in the  middle of April.  She was sent to emergency room today from her TCU due to staff concern that sacral wound seems to be getting worse.  Patient does have chronic sacral pain.  She does not notice that sacral pain has been different than usual.  She has not noticed any fevers or chills.  No other acute complaints.      Past Medical History    Past Medical History:   Diagnosis Date     AION (anterior ischaemic optic neuropathy), left eye     NAION LE     CAD (coronary artery disease) 3/2009    [facility]; Angio  UM- normal coronary arteries     Cataract      CVA (cerebral vascular accident) (H)     admitted at [facility]     on Cymbalta     Diabetes mellitus, type 2 (H)      Diabetic nephropathy (H)      Diabetic neuropathy (H)     severe     Diabetic retinopathy (H)      GERD (gastroesophageal reflux disease)      Hyperlipidemia      Hypertension     ECHO , TDS, NL EF     Infection due to  novel coronavirus 2023     POAG (primary open-angle glaucoma)     adv BE     Seasonal allergies      Tubular adenoma of colon     repeat colonoscopy in        Past Surgical History   Past Surgical History:   Procedure Laterality Date     CARDIAC CATHETERIZATION       CATARACT EXTRACTION W/ INTRAOCULAR LENS IMPLANT Bilateral       SECTION        SECTION       COLONOSCOPY  7/15/2013    Tubular adenoma; repeat in 2018;Procedure: COMBINED COLONOSCOPY, SINGLE BIOPSY/POLYPECTOMY BY BIOPSY;;  Surgeon: Don King MD;  Tubular adenoma     COLONOSCOPY N/A 2020    Procedure: COLONOSCOPY;  Surgeon: Sid Amanda MD;  Location: [facility]     CORONARY STENT PLACEMENT  2004    RCA     DAVINCI HYSTERECTOMY TOTAL, BILATERAL SALPINGO-OOPHORECTOMY, COMBINED N/A 2019    Procedure: DaVinci Assisted Total Laparoscopic Hysterectomy, Removal Of Both Tubes And Ovaries;  Surgeon: Linh Cardoso MD;  Location: [facility]     EXTRACAPSULAR CATARACT EXTRATION WITH  INTRAOCULAR LENS IMPLANT  11-10-09, 2-9-10    11-10-09 Lt, 2-9-10 Rt; Left eye 2012     HYSTERECTOMY TOTAL ABDOMINAL, BILATERAL SALPINGO-OOPHORECTOMY, COMBINED  2019    Procedure: DaVinci Assisted Total Laparoscopic Hysterectomy, Removal Of Both Tubes And Ovaries; Surgeon: Linh Cardoso MD; Location: [facility] OR       IRRIGATION AND DEBRIDEMENT TRUNK, COMBINED Right 2023    Procedure: DEBRIDEMENT OF SACRAL ULCER,  DEBRIDEMENT OF RIGHT HIP ULCER;  Surgeon: Shawna Ragsdale MD;  Location: [facility] OR     PICC TRIPLE LUMEN PLACEMENT  2023          STENT, CORONARY, DELORES  2009    RCA       Prior to Admission Medications   Prior to Admission Medications   Prescriptions Last Dose Informant Patient Reported? Taking?   DULoxetine (CYMBALTA) 30 MG capsule   No No   Sig: 3 capsules (90 mg) by Oral or NG Tube route every morning   Nutritional Supplements (KIMMIE) PACK   No No   Sig: Take 1 packet by mouth 2 times daily (with meals)   acetaminophen (TYLENOL) 500 MG tablet   Yes No   Sig: Take 1,000 mg by mouth 3 times daily   aspirin (ASPIRIN LOW DOSE) 81 MG chewable tablet   No No   Si tablet (81 mg) by Oral or NG Tube route daily   atorvastatin (LIPITOR) 80 MG tablet   No No   Si tablet (80 mg) by Oral or NG Tube route every evening   diclofenac (VOLTAREN) 1 % topical gel   No No   Sig: Apply 2 g topically 4 times daily   diphenhydrAMINE (BENADRYL) 25 MG capsule   No No   Sig: Take 1 capsule (25 mg) by mouth every 6 hours as needed for itching   dorzolamide-timolol (COSOPT) 2-0.5 % ophthalmic solution   No No   Sig: Place 1 drop into both eyes 2 times daily   enoxaparin ANTICOAGULANT (LOVENOX) 30 MG/0.3ML syringe   No No   Sig: Inject 0.3 mLs (30 mg) Subcutaneous every 24 hours   Patient taking differently: Inject 30 mg Subcutaneous every 24 hours Until 23   insulin aspart (NOVOLOG PEN) 100 UNIT/ML pen   No No   Sig: Inject 1-10 Units Subcutaneous 3 times daily (before meals)   Patient taking  differently: Inject 2-12 Units Subcutaneous 3 times daily as needed 1) Sliding scale  Novolog TID before meals:  BS <200 give               0 units  -249                 2 units  -299                 4 units  -349                 6 units  -399                 8 units  -450                 10 units  BS>450                       12 units   insulin glargine (LANTUS PEN) 100 UNIT/ML pen   Yes No   Sig: Inject 6 Units Subcutaneous At Bedtime Hold for blood glucose less than 120   latanoprost (XALATAN) 0.005 % ophthalmic solution   No No   Sig: Place 1 drop into both eyes At Bedtime   melatonin 1 MG TABS tablet   No No   Sig: Take 1 tablet (1 mg) by mouth nightly as needed for sleep   melatonin 3 MG tablet   Yes No   Sig: Take 3 mg by mouth At Bedtime   metFORMIN (GLUCOPHAGE-XR) 750 MG 24 hr tablet   No No   Sig: Take 1 tablet (750 mg) by mouth 2 times daily (with meals)   methocarbamol (ROBAXIN) 500 MG tablet   No No   Sig: Take 1 tablet (500 mg) by mouth 4 times daily as needed for muscle spasms   metoprolol tartrate (LOPRESSOR) 25 MG tablet   Yes No   Sig: Take 12.5 mg by mouth 2 times daily Hold for SBP less than 100 or HR less than 55   mirtazapine (REMERON) 15 MG tablet   No No   Si tablet (15 mg) by Oral or NG Tube route At Bedtime   multivitamin w/minerals (THERA-VIT-M) tablet   No No   Si tablet by Oral or Feeding Tube route daily   nitroGLYCERIN (NITROSTAT) 0.4 MG SL tablet   Yes No   Sig: Place 0.4 mg under the tongue every 5 minutes as needed.   olopatadine (PATANOL) 0.1 % ophthalmic solution   No No   Sig: daily   pantoprazole (PROTONIX) 40 MG EC tablet   No No   Sig: Take 1 tablet (40 mg) by mouth every morning (before breakfast)   polyvinyl alcohol (LIQUIFILM TEARS) 1.4 % ophthalmic solution   Yes No   Sig: Place 1 drop into both eyes 3 times daily   senna-docusate (SENOKOT-S/PERICOLACE) 8.6-50 MG tablet   No No   Sig: Take 2 tablets by mouth 2 times daily as needed for  constipation   simethicone (MYLICON) 80 MG chewable tablet   No No   Si tablet (80 mg) by Oral or NG Tube route 4 times daily as needed   traZODone (DESYREL) 50 MG tablet   No No   Si tablet (50 mg) by Oral or NG Tube route At Bedtime      Facility-Administered Medications: None        Review of Systems    The 10 point Review of Systems is negative other than noted in the HPI    Social History   I have reviewed this patient's social history and updated it with pertinent information if needed.  Social History     Tobacco Use     Smoking status: Former     Packs/day: 1.50     Years: 45.00     Pack years: 67.50     Types: Cigarettes     Start date: 1968     Quit date: 3/6/2013     Years since quitting: 10.1     Smokeless tobacco: Never   Vaping Use     Vaping status: Never Used   Substance Use Topics     Alcohol use: Not Currently     Drug use: Never       Allergies   Allergies   Allergen Reactions     Dust Mites Other (See Comments)     Sneezing runny eyes and nose.     Other Environmental Allergy Hives     Mountain Dew     Pollen Extract Other (See Comments)     Sneezing runny eyes and nose.     Walnuts [Nuts] Hives        Physical Exam   Vital Signs: Temp: 97.8  F (36.6  C) Temp src: Oral BP: 101/42 Pulse: 95   Resp: 16 SpO2: 100 % O2 Device: None (Room air)    Weight: 0 lbs 0 oz    Gen:  NAD, A&O probably x2 to person and place.  Seems to answer questions fairly appropriately.  Seems to have some trouble with time.  Eyes:  PERRL, sclera anicteric.  OP:  MMM, no lesions.  CV:  Regular, no loud murmurs.  Lung:  CTA b/l, normal effort.  Ab:  +BS, soft.  Skin:  Warm, dry to touch.  Sacral dressing not removed.  Ext:  No pitting edema LE b/l.      Medical Decision Making       75 MINUTES SPENT BY ME on the date of service doing chart review, history, exam, documentation & further activities per the note.      Data     I have personally reviewed the following data over the past 24 hrs:    10.9  \   8.1 (L)    / 473 (H)     134 (L) 106 44.4 (H) /  174 (H)   4.8 14 (L) 1.23 (H) \       ALT: 94 (H) AST: 265 (H) AP: 773 (H) TBILI: 0.3   ALB: 2.3 (L) TOT PROTEIN: 7.0 LIPASE: N/A       Procal: 0.42 (H) CRP: N/A Lactic Acid: 1.0         Imaging results reviewed over the past 24 hrs:   Recent Results (from the past 24 hour(s))   US Abdomen Limited    Narrative    EXAM: US ABDOMEN LIMITED  LOCATION: [facility]  DATE/TIME: 5/8/2023 6:52 PM CDT    INDICATION: RUQ pain  COMPARISON: CT 01/03/2023  TECHNIQUE: Limited abdominal ultrasound.    FINDINGS:    GALLBLADDER: The gallbladder is distended with a thin wall. Gallbladder sludge. No shadowing stone. The sonographic Mayo sign is negative.    BILE DUCTS: No biliary dilatation. The common duct measures 4 mm.    LIVER: Normal parenchyma with smooth contour. No focal mass.    RIGHT KIDNEY: No hydronephrosis.    PANCREAS: The pancreas is largely obscured by overlying gas.    No ascites.      Impression    IMPRESSION:  1.  Gallbladder sludge without evidence of acute cholecystitis.

## 2023-05-10 NOTE — PROGRESS NOTES
PALLIATIVE CARE SOCIAL WORK Progress Note   Location: [facility]    PCSW attended care conference. Made introductions to family members Court (Alexandra), Camron, and Jonas. Educated on role of palliative care SW.     Plan: Palliative care team continues to follow.    Clinical Social Work Interventions:   Introduction of Palliative clinical social work interventions  Attended/participated in care conference    Evie Reddy [facility]  [facility], Palliative Care  Securely message with the [employer] Web Console (learn more here)  Or please use Palliative Team Pager

## 2023-05-10 NOTE — PROGRESS NOTES
[facility]    Medicine Progress Note - Hospitalist Service    Date of Admission:  5/8/2023    Assessment & Plan     Cristal Preciado is a 73 year old female admitted on 5/8/2023. She has a past medical history significant for diabetes mellitus type 2, hypertension, hyperlipidemia, coronary artery disease, chronic sacral decubitus ulcer, recent sepsis due to osteomyelitis requiring 8 weeks of antibiotics, previous stroke, GERD.  She was sent to emergency room due to concern for possible worsening of her chronic sacral wound.     Acute worsening of chronic sacral pressure wound.  Chronic right hip wound.  Chronic bilateral heel ulcers.  -Hospitalized middle of February for sepsis due to sacral osteomyelitis and possible necrotizing fasciitis.  -Completed 8 weeks of antibiotics in the middle of April.  -Has been at TCU.  -Staff at TCU concerned that sacral wound might be getting worse.  -Check CT pelvis.  -CRP elevated at greater than 200.  -Recheck CRP intermittently.  -Appreciate infectious disease consult.  -Appreciate orthopedic surgery consult.  -Appreciate wound nurse consult.  -Continue IV Zosyn.  -Discussed case with transfer line at [facility] for possible transfer on 5/10.  -Chart reviewed by physicians at [facility] that patient would not currently be a good candidate for surgical intervention.  -Space at their facility limited.  -They requested further optimization of patient at our facility with IV antibiotics and close monitoring.     Diabetes mellitus type 2.  -Had recently been taking off scheduled insulin due to episodes of hypoglycemia.  -Metformin stopped.  -Continue aspart insulin sliding scale as needed.     Hypertension.  Hyperlipidemia.  Coronary artery disease.  Previous stroke.  -Has been mildly hypotensive here at times.  -Hold metoprolol.  -Continue aspirin 81 mg a day.  -Decrease atorvastatin to10 mg a day.     Chronic kidney disease stage  IIIa.  -Creatinine appears to be at baseline.  -Avoid nephrotoxins as able.  -Creatinine stable today at 1.1.  -Recheck metabolic panel intermittently.     Transaminitis.  -Mild.  -Abdominal ultrasound shows gallbladder sludge.  -Recheck LFTs intermittently.     Chronic anemia.  -Hemoglobin mildly lower than recent baseline.  -No known acute hemorrhage.  -Hemoglobin has been stable at 9.  -Recheck CBC intermittently.     GERD.  -Continue pantoprazole 40 mg a day.    Dysphagia.  -Appreciate speech pathology input.  -Dysphagia diet.    Severe protein/calorie malnutrition.  -Appreciate registered dietitian input.  -Add dietary supplements.    I did participate in a family conference for an additional 30 minutes with 3 of the patient's children.     Diet: Combination Diet Pureed Diet (level 4); Thin Liquids (level 0)  Snacks/Supplements Adult: Other; [employer] offerings w/ lunch + High protein Gelatein w/ breakfast + 1 pkt Terrence mixed w/ apple juice w/ dinner; With Meals    DVT Prophylaxis: Pneumatic Compression Devices  Cornejo Catheter: PRESENT, indication: Wound Healing  Lines: PRESENT             Cardiac Monitoring: None  Code Status: Full Code      Clinically Significant Risk Factors           # Hypercalcemia: corrected calcium is >10.1, will monitor as appropriate    # Hypoalbuminemia: Lowest albumin = 1.5 g/dL at 5/8/2023  7:00 AM, will monitor as appropriate          # DMII: A1C = N/A within past 6 months, PRESENT ON ADMISSION   # Severe Malnutrition: based on nutrition assessment, PRESENT ON ADMISSION       Disposition Plan      Expected Discharge Date: 05/12/2023                  Andrade Leiva DO  Hospitalist Service  [facility]  Securely message with [employer] (more info)  Text page via [employer] Paging/Directory   ______________________________________________________________________    Interval History   Having some left shoulder pain today.  Denies chest pain, shortness of breath,  fevers, chills, nausea, or vomiting.    Physical Exam   Vital Signs: Temp: 97.7  F (36.5  C) Temp src: Temporal BP: (!) 147/65 Pulse: 104   Resp: 16 SpO2: 100 % O2 Device: None (Room air)    Weight: 0 lbs 0 oz    Gen:  NAD, A&Ox1 to self/family.  Trouble with place and time.  Eyes:  PERRL, sclera anicteric.  OP:  MMM, no lesions.  Neck:  Supple.  CV:  Regular, no murmurs.  Lung:  CTA b/l, normal effort.  Ab:  +BS, soft.  Skin:  Warm, dry to touch.  Wound dressings not removed today.  Ext: Mild non pitting edema LE b/l.      Medical Decision Making       55 MINUTES SPENT BY ME on the date of service doing chart review, history, exam, documentation & further activities per the note.      Data     I have personally reviewed the following data over the past 24 hrs:    N/A  \   N/A   / N/A     136 106 45.2 (H) /  192 (H)   4.3 14 (L) 1.10 (H) \       Imaging results reviewed over the past 24 hrs:   No results found for this or any previous visit (from the past 24 hour(s)).

## 2023-05-10 NOTE — CONSULTS
[facility]    Infectious Disease Consultation     Date of Admission:  5/8/2023  Date of Consult (When I saw the patient): 05/10/23    Assessment & Plan   Cristal Preciado is a 73 year old female who was admitted on 5/8/2023.     Impression: 1 73-year-old female with underlying cognitive impairment, recent deterioration in her overall condition that included multiple decubitus wound formation, recent lengthy hospitalization in [place], debridement of necrotizing sacral wound unclear whether osteomyelitis but got an extended IV course in the hospital and oral antibiotics with a total of about 8 weeks of antibiotics off antibiotics 2 to 3 weeks and now with worsening wound  2 multiple other on infected looking decubitus wound  3 chronic cognitive impairment  4 diabetes mellitus  5 chronic kidney disease    REC 1 Zosyn for now, antibiotic role here is minimal not clear we have major necrotizing infection that she had had before but surgery to see and see if debridement indicated  2 long-term approach to this if there is osteomyelitis would likely involve plastic surgery type closure, whether overall condition will allow that to occur as the patient has to be able to be cooperative and also pressure relieved so does not have new wound formation which has been an ongoing issue for her.  Likely a poor candidate for this intervention  3 unless evolves into sepsis or is surprisingly bacteremic antibiotic role alone  here is minimal        Rafael Mello MD    Reason for Consult   Reason for consult: I was asked to evaluate this patient for worsening sacral decubitus with.    Primary Care Physician   Dayna Marie    Chief Complaint   Some pain related to the wound    History is obtained from the patient and medical records    History of Present Illness   Cristal Preciado is a 73 year old female who presents with worsening sacral decubitus wound.  Patient has underlying cognitive impairment to exact status of  this is unclear.  Previous care in the [place] system.  She had a lengthy hospitalization earlier this year with a necrotizing new sacral wound.  Of note she has multiple other wounds as well.  Sacral wound was debrided multiple organisms grew out extended course of IV antibiotics in the hospital for near a month.  Then was on extended oral Augmentin course until mid April.  At that point antibiotics were stopped the wound was stable exactly what we have been doing wound care wise is unclear.  Now has worsening wound some drainage some increased pain.    Past Medical History   I have reviewed this patient's medical history and updated it with pertinent information if needed.   Past Medical History:   Diagnosis Date     AION (anterior ischaemic optic neuropathy), left eye     NAION LE     CAD (coronary artery disease) 3/2009    [facility]; Angio  UM- normal coronary arteries     Cataract      CVA (cerebral vascular accident) (H)     admitted at [facility]     on Cymbalta     Diabetes mellitus, type 2 (H)      Diabetic nephropathy (H)      Diabetic neuropathy (H)     severe     Diabetic retinopathy (H)      GERD (gastroesophageal reflux disease)      Hyperlipidemia      Hypertension     ECHO 2013, TDS, NL EF     Infection due to 2019 novel coronavirus 2023     POAG (primary open-angle glaucoma)     adv BE     Seasonal allergies      Tubular adenoma of colon 2013    repeat colonoscopy in 2018       Past Surgical History   I have reviewed this patient's surgical history and updated it with pertinent information if needed.  Past Surgical History:   Procedure Laterality Date     CARDIAC CATHETERIZATION       CATARACT EXTRACTION W/ INTRAOCULAR LENS IMPLANT Bilateral       SECTION        SECTION       COLONOSCOPY  7/15/2013    Tubular adenoma; repeat in 2018;Procedure: COMBINED COLONOSCOPY, SINGLE BIOPSY/POLYPECTOMY BY BIOPSY;;  Surgeon: Don King MD;  Tubular  adenoma     COLONOSCOPY N/A 2020    Procedure: COLONOSCOPY;  Surgeon: Sid Amanda MD;  Location: [facility] GI     CORONARY STENT PLACEMENT  2004    RCA     DAVINCI HYSTERECTOMY TOTAL, BILATERAL SALPINGO-OOPHORECTOMY, COMBINED N/A 2019    Procedure: DaVinci Assisted Total Laparoscopic Hysterectomy, Removal Of Both Tubes And Ovaries;  Surgeon: Linh Cardoso MD;  Location: [facility] OR     EXTRACAPSULAR CATARACT EXTRATION WITH INTRAOCULAR LENS IMPLANT  11-10-09, 2-9-10    11-10-09 Lt, 2-9-10 Rt; Left eye 2012     HYSTERECTOMY TOTAL ABDOMINAL, BILATERAL SALPINGO-OOPHORECTOMY, COMBINED  2019    Procedure: DaVinci Assisted Total Laparoscopic Hysterectomy, Removal Of Both Tubes And Ovaries; Surgeon: Linh Cardoso MD; Location: [facility] OR       IRRIGATION AND DEBRIDEMENT TRUNK, COMBINED Right 2023    Procedure: DEBRIDEMENT OF SACRAL ULCER,  DEBRIDEMENT OF RIGHT HIP ULCER;  Surgeon: Shawna Ragsdale MD;  Location: [facility] OR     PICC TRIPLE LUMEN PLACEMENT  2023          STENT, CORONARY, DELORES  2009    RCA       Prior to Admission Medications   Prior to Admission Medications   Prescriptions Last Dose Informant Patient Reported? Taking?   DULoxetine (CYMBALTA) 30 MG capsule 2023  No Yes   Sig: 3 capsules (90 mg) by Oral or NG Tube route every morning   acetaminophen (TYLENOL) 500 MG tablet 2023  Yes Yes   Sig: Take 1,000 mg by mouth 3 times daily   aspirin (ASPIRIN LOW DOSE) 81 MG chewable tablet 2023  No Yes   Si tablet (81 mg) by Oral or NG Tube route daily   atorvastatin (LIPITOR) 80 MG tablet 2023 at pm  No No   Si tablet (80 mg) by Oral or NG Tube route every evening   diclofenac (VOLTAREN) 1 % topical gel 2023  No Yes   Sig: Apply 2 g topically 4 times daily   diphenhydrAMINE (BENADRYL) 25 MG capsule Unknown  No Yes   Sig: Take 1 capsule (25 mg) by mouth every 6 hours as needed for itching   dorzolamide-timolol (COSOPT) 2-0.5 % ophthalmic solution  2023 at am  No Yes   Sig: Place 1 drop into both eyes 2 times daily   insulin aspart (NOVOLOG PEN) 100 UNIT/ML pen Unknown  No Yes   Sig: Inject 1-10 Units Subcutaneous 3 times daily (before meals)   Patient taking differently: Inject 2-12 Units Subcutaneous 3 times daily as needed 1) Sliding scale  Novolog TID before meals:  BS <200 give               0 units  -249                 2 units  -299                 4 units  -349                 6 units  -399                 8 units  -450                 10 units  BS>450                       12 units   insulin glargine (LANTUS PEN) 100 UNIT/ML pen Unknown at held past few days for bg<120  Yes Yes   Sig: Inject 6 Units Subcutaneous At Bedtime Hold for blood glucose less than 120   latanoprost (XALATAN) 0.005 % ophthalmic solution 2023 at hs  No No   Sig: Place 1 drop into both eyes At Bedtime   melatonin 1 MG TABS tablet Unknown at prn  No Yes   Sig: Take 1 tablet (1 mg) by mouth nightly as needed for sleep   melatonin 3 MG tablet 2023 at hs  Yes No   Sig: Take 3 mg by mouth At Bedtime   metFORMIN (GLUCOPHAGE) 500 MG tablet 2023  Yes Yes   Sig: Take 500 mg by mouth 2 times daily (with meals) Hold for BG < 120   methocarbamol (ROBAXIN) 500 MG tablet Unknown at prn  No Yes   Sig: Take 1 tablet (500 mg) by mouth 4 times daily as needed for muscle spasms   metoprolol tartrate (LOPRESSOR) 25 MG tablet 2023 at am  Yes No   Sig: Take 12.5 mg by mouth 2 times daily Hold for SBP less than 100 or HR less than 55   mirtazapine (REMERON) 15 MG tablet 2023 at hs  No No   Si tablet (15 mg) by Oral or NG Tube route At Bedtime   multivitamin w/minerals (THERA-VIT-M) tablet 2023  No Yes   Si tablet by Oral or Feeding Tube route daily   nitroGLYCERIN (NITROSTAT) 0.4 MG SL tablet Unknown  Yes Yes   Sig: Place 0.4 mg under the tongue every 5 minutes as needed.   oxyCODONE (ROXICODONE) 5 MG tablet 5/3/2023 at 0200  Yes Yes    Sig: Take 5 mg by mouth every 6 hours as needed for pain (from pressure wounds)   oxyCODONE (ROXICODONE) 5 MG tablet 2023 at AM  Yes Yes   Sig: Take 5 mg by mouth every morning   pantoprazole (PROTONIX) 40 MG EC tablet 2023  No Yes   Sig: Take 1 tablet (40 mg) by mouth every morning (before breakfast)   polyvinyl alcohol (LIQUIFILM TEARS) 1.4 % ophthalmic solution 2023  Yes Yes   Sig: Place 1 drop into both eyes 3 times daily   senna-docusate (SENOKOT-S/PERICOLACE) 8.6-50 MG tablet Unknown  No Yes   Sig: Take 2 tablets by mouth 2 times daily as needed for constipation   simethicone (MYLICON) 80 MG chewable tablet Unknown  No Yes   Si tablet (80 mg) by Oral or NG Tube route 4 times daily as needed   traZODone (DESYREL) 50 MG tablet 2023  No No   Si tablet (50 mg) by Oral or NG Tube route At Bedtime      Facility-Administered Medications: None     Allergies   Allergies   Allergen Reactions     Dust Mites Other (See Comments)     Sneezing runny eyes and nose.     Other Environmental Allergy Hives     Mountain Dew     Pollen Extract Other (See Comments)     Sneezing runny eyes and nose.     Walnuts [Nuts] Hives       Immunization History   Immunization History   Administered Date(s) Administered     COVID-19 Bivalent 12+ (Pfizer) 2022     COVID-19 Monovalent 18+ (Moderna) 2020, 2021, 2021, 2022     FLU 6-35 months 2009, 2010     Flu, Unspecified 10/26/2016, 10/12/2017, 10/15/2018, 10/11/2019, 10/22/2020     Influenza (High Dose) 3 valent vaccine 10/15/2018, 10/11/2019, 10/22/2020, 10/18/2021, 10/20/2022     Influenza (IIV3) PF 2003, 2004, 11/10/2005, 10/19/2006, 2009, 10/24/2011, 2012, 10/21/2015     Influenza Vaccine >6 months (Alfuria,Fluzone) 2014     Influenza Vaccine IM Ages 6-35 Months 4 Valent (PF) 10/18/2021     Pneumo Conj 13-V (&after) 2016     Pneumococcal (PCV 7) 2003     Pneumococcal 23 valent  11/04/2003, 10/24/2011     TD,PF 7+ (Tenivac) 05/13/2003     TDAP (Adacel,Boostrix) 06/12/2014     TDAP Vaccine (Boostrix) 06/12/2014     Zoster vaccine, live 12/12/2013       Social History   I have reviewed this patient's social history and updated it with pertinent information if needed. Cristal Preciado  reports that she quit smoking about 10 years ago. Her smoking use included cigarettes. She started smoking about 55 years ago. She has a 67.50 pack-year smoking history. She has never used smokeless tobacco. She reports that she does not currently use alcohol. She reports that she does not use drugs.    Family History   I have reviewed this patient's family history and updated it with pertinent information if needed.   Family History   Problem Relation Age of Onset     Hypertension Mother      Cerebrovascular Disease Mother      Glaucoma Father      Cancer Father      Diabetes Sister      Glaucoma Sister      Cancer - colorectal Other      Cerebrovascular Disease Other      Skin Cancer No family hx of      Melanoma No family hx of      Anesthesia Reaction No family hx of      Deep Vein Thrombosis (DVT) No family hx of      Arthritis Brother      Diabetes Sister      Glaucoma Sister        Review of Systems   The 10 point Review of Systems is negative other than does not feel particularly ill but poor historian, some pain in the buttocks area admitted to rest review of systems largely either not obtainable or negative    Physical Exam   Temp: 97.7  F (36.5  C) Temp src: Temporal BP: 137/55 Pulse: 111   Resp: 16 SpO2: 100 % O2 Device: None (Room air)    Vital Signs with Ranges  Temp:  [97.7  F (36.5  C)-101.1  F (38.4  C)] 97.7  F (36.5  C)  Pulse:  [111-127] 111  Resp:  [16] 16  BP: (114-137)/(52-55) 137/55  SpO2:  [100 %] 100 %  0 lbs 0 oz  Body mass index is 20.36 kg/m .    GENERAL APPEARANCE:  Awake obvious cognitive impairment  EYES: Eyes grossly normal to inspection  NECK: no adenopathy  RESP: lungs clear   CV:  regular rates and rhythm  LYMPHATICS: normal ant/post cervical and supraclavicular nodes  ABDOMEN: soft, nontender  MS: extremities normal  SKIN: no suspicious lesions or rashes  Pictures of wound seen, wounds covered the foot and hip area wounds not particular infected looking minimal erythema some drainage from the sacral decubitus wound suspect this is deep and probable osteo-      Data   All laboratory and imaging data in the past 24 hours reviewed  No results for input(s): CULT in the last 168 hours.  Recent Labs   Lab Test 08/06/19  0835 04/29/15  1420   CULT No growth No growth after 4 weeks          All cultures:  Recent Labs   Lab 05/08/23  1547 05/08/23  1527   CULTURE No growth after 1 day No growth after 1 day      Blood culture:  Results for orders placed or performed during the hospital encounter of 05/08/23   Blood Culture Hand, Left    Specimen: Hand, Left; Blood   Result Value Ref Range    Culture No growth after 1 day    Blood Culture Wrist, Left    Specimen: Wrist, Left; Blood   Result Value Ref Range    Culture No growth after 1 day    Results for orders placed or performed during the hospital encounter of 02/16/23   Blood Culture Line, venous    Specimen: Line, venous; Blood   Result Value Ref Range    Culture No Growth    Blood Culture Peripheral Blood    Specimen: Peripheral Blood   Result Value Ref Range    Culture No Growth      *Note: Due to a large number of results and/or encounters for the requested time period, some results have not been displayed. A complete set of results can be found in Results Review.      Urine culture:  Results for orders placed or performed during the hospital encounter of 02/16/23   Urine Culture    Specimen: Urine, Midstream   Result Value Ref Range    Culture 50,000-100,000 CFU/mL Mixture of urogenital brittani    Urine Culture    Specimen: Urine, Midstream   Result Value Ref Range    Culture 10,000-50,000 CFU/mL Mixture of urogenital brittani    Results for orders  placed or performed during the hospital encounter of 08/05/19   Urine Culture Aerobic Bacterial    Specimen: Catheterized Urine   Result Value Ref Range    Specimen Description Catheterized Urine     Culture Micro No growth      *Note: Due to a large number of results and/or encounters for the requested time period, some results have not been displayed. A complete set of results can be found in Results Review.

## 2023-05-10 NOTE — PROGRESS NOTES
PALLIATIVE CARE PROGRESS NOTE  [facility]     Patient Name: Cristal Preciado  Date of Admission: 5/8/2023   Today the patient was seen for: goals of care conversations       Recommendations & Counseling      GOALS OF CARE:     Restorative without limits      ADVANCE CARE PLANNING:    has directive, there are questions and concerns that need to be addressed in the coming days    There is a POLST form which will need update prior to discharge.    Code status: Full Code     MEDICAL MANAGEMENT:   #Pain,acute on chronic     Opioids: oxycodone (Roxicodone) IR -scheduled oxycodone 2.5 mg every 8 hours to make sure she is getting some pain medications, will reassess tomorrow for on going pain control    Acetaminophen (Tylenol), PRN    Other medicines for pain: cymbalta     #Protein calorie malnutrition in the context of chronic illness as evidenced by poor nutrient intake and muscle loss    Encourage PO intakes as able     #Constipation,Medication adverse effect  Last documented BM: unknown  Sennoside (Senokot)  Polyethylene glycol (MiraLAX)      PSYCHOSOCIAL/SPIRITUAL:    Family Daughters Valerie and Court, son Camron      Palliative Care will continue to follow. Thank you for allowing us to aid in the care of Cristal Preciado.    These recommendations have been discussed with medical team and family.    Libertad Romano NP  Securely message with [employer] (more info)  Text page via [employer] Paging/Directory         Assessments          Patient with increased pain. Per  and the previous TCU record she was taking oxycodone up to 4 times a day. Will add scheduled oxycodone 2.5 mg every 8 hours, encouraged the use of the pain medications prior to cares and dressing changes.    Phone call placed to Valerie ([phone]) current HCA. Discussed and reviewed her overall goals of care for her mom. Initially she reports that she wants to see her mom return to the facility. Discussed the concerns for  worsening wound and infection, she verbalized that she was not informed of this and then stated she wants her mom to get all the treatments that she needs. Reviewed code status with her as well and she reports that she is wanting full code. (these goals were also aligning with Camron, and Court). The family appears to have similar goals for this patient. Discussed the medical team, nursing and family. We also discussed her concerns with her brother visiting yesterday, and she reports that her and Camron do not get along but she doesn't want Camron banned from the hospital but would like it if he wasn't there when she was. She reports that she is not concerned about her mom's wellbeing and that her mom would want and feels comfortable with all her children visiting.   care conference with the family to address goals of care and expectations. Discussed and reviewed medications and symptom management. Discussed family conferences in order to talk about goals given the question of the HCA.              Interval History:     Prognosis, Goals, or Advance Care Planning was addressed today:  Advance Care Planning Discussion 5/10/2023. Libertad BRUNO NP met with Patient's family today at the hospital to discuss Advance Care Planning. Cristal Preciado does not have decisional capacity  and was not present for this discussion due to confusiong.  Those present were informed of the voluntary nature of this discussion and wished to proceed.  The discussion included: goals of care, code status, wishes and worries, functional decline,  and family understanding.. This discussion began at 0830 and ended at 0900 and again from 1300 -1348 for a total of  78 minutes. Family is all in agreement that they want restorative cares.      Chart review/discussion with unit or clinical team members:   Medical team, nursing, family            Review of Systems:     Besides above, an additional  system ROS was reviewed and is  unremarkable               Physical Exam:   Temp: 97.7  F (36.5  C) Temp src: Temporal Temp  Min: 97.7  F (36.5  C)  Max: 101.1  F (38.4  C) BP: (!) 147/65 Pulse: 104   Resp: 16 SpO2: 100 % O2 Device: None (Room air)    Vital Signs with Ranges  Temp:  [97.7  F (36.5  C)-101.1  F (38.4  C)] 97.7  F (36.5  C)  Pulse:  [104-127] 104  Resp:  [16] 16  BP: (114-147)/(52-65) 147/65  SpO2:  [100 %] 100 %  0 lbs 0 oz    Physical Exam  Constitutional:       Appearance: She is ill-appearing.   HENT:      Nose: Nose normal.      Mouth/Throat:      Mouth: Mucous membranes are moist.      Pharynx: Oropharynx is clear.   Eyes:      Conjunctiva/sclera: Conjunctivae normal.   Cardiovascular:      Rate and Rhythm: Normal rate.   Pulmonary:      Effort: Pulmonary effort is normal.   Abdominal:      General: Bowel sounds are normal.   Skin:     General: Skin is warm and dry.      Comments: Dressings intact   Neurological:      Mental Status: She is alert.   Psychiatric:         Mood and Affect: Mood normal.         Behavior: Behavior normal.                  Current Problem List:   Principal Problem:    Elevated LFTs  Active Problems:    Pressure injury, stage 4, with infection (H)    Pressure injury of skin of contiguous region involving back and right hip, unspecified injury stage    Hyponatremia    Anemia, unspecified type    Chronic kidney disease, unspecified CKD stage      Allergies   Allergen Reactions     Dust Mites Other (See Comments)     Sneezing runny eyes and nose.     Other Environmental Allergy Hives     Mountain Dew     Pollen Extract Other (See Comments)     Sneezing runny eyes and nose.     Walnuts [Nuts] Hives            Data Reviewed:       Results for orders placed or performed during the hospital encounter of 05/08/23 (from the past 24 hour(s))   Comprehensive metabolic panel   Result Value Ref Range    Sodium 138 136 - 145 mmol/L    Potassium 4.5 3.4 - 5.3 mmol/L    Chloride 110 (H) 98 - 107 mmol/L    Carbon  Dioxide (CO2) 13 (L) 22 - 29 mmol/L    Anion Gap 15 7 - 15 mmol/L    Urea Nitrogen 42.6 (H) 8.0 - 23.0 mg/dL    Creatinine 1.05 (H) 0.51 - 0.95 mg/dL    Calcium 9.0 8.8 - 10.2 mg/dL    Glucose 135 (H) 70 - 99 mg/dL    Alkaline Phosphatase 677 (H) 35 - 104 U/L     (H) 10 - 35 U/L    ALT 77 (H) 10 - 35 U/L    Protein Total 6.7 6.4 - 8.3 g/dL    Albumin 2.2 (L) 3.5 - 5.2 g/dL    Bilirubin Total 0.3 <=1.2 mg/dL    GFR Estimate 57 (L) >60 mL/min/1.73m2   CBC with platelets   Result Value Ref Range    WBC Count 10.3 4.0 - 11.0 10e3/uL    RBC Count 3.28 (L) 3.80 - 5.20 10e6/uL    Hemoglobin 9.0 (L) 11.7 - 15.7 g/dL    Hematocrit 28.6 (L) 35.0 - 47.0 %    MCV 87 78 - 100 fL    MCH 27.4 26.5 - 33.0 pg    MCHC 31.5 31.5 - 36.5 g/dL    RDW 21.7 (H) 10.0 - 15.0 %    Platelet Count 419 150 - 450 10e3/uL   Glucose by meter   Result Value Ref Range    GLUCOSE BY METER POCT 159 (H) 70 - 99 mg/dL   Glucose by meter   Result Value Ref Range    GLUCOSE BY METER POCT 141 (H) 70 - 99 mg/dL   Glucose by meter   Result Value Ref Range    GLUCOSE BY METER POCT 99 70 - 99 mg/dL   Basic metabolic panel   Result Value Ref Range    Sodium 136 136 - 145 mmol/L    Potassium 4.3 3.4 - 5.3 mmol/L    Chloride 106 98 - 107 mmol/L    Carbon Dioxide (CO2) 14 (L) 22 - 29 mmol/L    Anion Gap 16 (H) 7 - 15 mmol/L    Urea Nitrogen 45.2 (H) 8.0 - 23.0 mg/dL    Creatinine 1.10 (H) 0.51 - 0.95 mg/dL    Calcium 9.3 8.8 - 10.2 mg/dL    Glucose 201 (H) 70 - 99 mg/dL    GFR Estimate 53 (L) >60 mL/min/1.73m2   Glucose by meter   Result Value Ref Range    GLUCOSE BY METER POCT 174 (H) 70 - 99 mg/dL     *Note: Due to a large number of results and/or encounters for the requested time period, some results have not been displayed. A complete set of results can be found in Results Review.          Medical Decision Making     78 MINUTES SPENT BY ME on the date of service doing chart review, history, exam, documentation & further activities per the note.

## 2023-05-10 NOTE — PROGRESS NOTES
Care Management Follow Up    Length of Stay (days): 2    Expected Discharge Date: 05/12/2023     Concerns to be Addressed:       Patient plan of care discussed at interdisciplinary rounds: Yes    Anticipated Discharge Disposition:  Return to LTC facility, [facility]     Additional Information:  Pt has a bed hold at [facility].  Pt is in the process of transferring from [facility] to [facility].      Lesli Davis, DAPHNEY, [facility], [facility]  Inpatient Care Coordination  [facility]  [phone]

## 2023-05-10 NOTE — PROGRESS NOTES
"Transfer Type: [facility]  Transfer Triage Note    Date of call: 05/10/23  Time of call: 2:31 PM    Current Patient Location:  [facility] in  Current Level of Care: Med Surg    Vitals: Temp: 97.7  F (36.5  C) Temp src: Temporal BP: (!) 147/65 Pulse: 104   Resp: 16 SpO2: 100 % Height: 170.2 cm (5' 7\")    O2 Device: None (Room air) at    Diagnosis: sacral osteomyelitis  Is COVID-19 a concern? No  Reason for requested transfer: Further diagnostic work up, management, and consultation for specialized care   Isolation Needs: None    Outside Records: Available  Additional records may be faxed to [phone].    Transfer accepted: No.    Rationale for declining transfer or suggested follow-up: patient poor surgical candidate    Recommendations for Management and Stabilization: Not needed    Additional Comments: 73 year old woman with history of Type 2 Diabetes Mellitus, sacral osteomyelitis s/p antibiotics readmitted with concern for worsening of her sacral wound.  Seen by ID who recommended ongoing antibiotics and consideration of plastics involvement.  At present, this patient is a very poor operative candidate and ongoing discussions are needed about goals of care.  She would not be a flap candidate until she improves substantially.    Would suggest ongoing medical management and GOC discussions - if she improves could reconsider transfer request.     José Miguel Pinon MD      "

## 2023-05-10 NOTE — PROGRESS NOTES
[facility]   Procedure Note          Midline Catheter Insertion:       Cristal Preciado  MRN# [id number]   May 10, 2023, 7:08 AM Indication: Acute access           Pause for the cause: Patient ID's verified using two distinct indicators  All necessary equipment is present  Site marked if extremity to be used has been predetermined   Type of line to be used: Midline catheter   Full barrier precautions used: Yes   Skin preparation: Chlorehexidine Gluconate 25% with isopropyl alcohol 70%   Date of insertion: May 10, 2023, 6:40 AM   Device type: Single lumen, non-valved, 3.0   Catheter brand: Ganjiwang   Lot number: [id number]   Insertion location: Left brachial vein (medial)   Method of placement: Venipuncture  MST  Ultrasound   Number of attempts: With ultrasound: 2   Without ultrasound: 0   Difficulty threading: No   Midline IV device: Dressing dry and intact   Arm circumference: Adults 10 cm above Left AC   Midline extremity circumference: 29 cm   Internal length: 14 cm   Midline visible catheter length: 0 cm   Total catheter length: 14 cm   Tip termination: Axilla (midline)   Method of verification: Ultrasound and blood return   Midline patency post placement: Positive blood return  Flushes without difficulty  Saline locked   Line flush: Line flush documented on the eMAR yes   Placement verified by: Registered Nurse   Catheter placed by: MOSES Gold   Discontinuation form initiated: No   Patient tolerance: Tolerated well      Summary:  This procedure was performed with slight difficulty and she tolerated the procedure well with no noted immediate complications.   Difficult access.  Labs drawn from Midline - green tube - and handed to lab personnel at bedside for labeling.  3ml waste.  Line flushed with 10 ml saline after and cap changed.  OK to use for meds, labs and power injection.      Recorded by MOSES Gold    Attestation:  Amount of time performed on this procedure: 20 minutes.

## 2023-05-10 NOTE — PROGRESS NOTES
Pt repositioned from side to side but would yell to be place back on left side. Many attempts to shift weight intermittently to alleviate pressure on unaffected side. Pt disorientated x4, IV attempted x2, vascular unsuccessful - PICC stat RN spoke to this writer on phone and will be at [place] late this evening and will call if unable to make it. Wound care completed by rena VARELA in place for wound healing. Took medication crushed in apple sauce.   Febrile and tachy - tylenol administered

## 2023-05-10 NOTE — PROGRESS NOTES
"   05/10/23 1000   Appointment Info   Signing Clinician's Name / Credentials (SLP) SAUMYA Cuevas, SLP  Intern   Student Supervision Direct supervision provided;Direct Patient Contact Provided   General Information   Onset of Illness/Injury or Date of Surgery 05/08/23   Referring Physician Andrade Leiva DO   Patient/Family Therapy Goal Statement (SLP) Goal was not stated by pt but son acknowledges she is having difficulty swallowing.   Pertinent History of Current Problem Per MD report \"Cristal Preciado is a 73 year old female with a history of Cognitive impairment, CAD, hypertension, diabetes type 2, and sacral ulcer who presents for evaluation of rising LFTs and worsening sacral decubitus ulcer findings and concern for infection.  The patient is not able to describe in detail any concerns but does note ongoing pain to the left buttock\"     SLP consulted for clinical swallow eval d/t concern for dysphagia.   Pain Assessment   Patient Currently in Pain Yes, see Vital Sign flowsheet   Type of Evaluation   Type of Evaluation Swallow Evaluation   Oral Motor   Oral Musculature unable to assess due to poor participation/comprehension   Dentition (Oral Motor)   Dentition (Oral Motor) edentulous   Facial Symmetry (Oral Motor)   Facial Symmetry (Oral Motor) unable/difficult to assess   Lip Function (Oral Motor)   Lip Range of Motion (Oral Motor) unable/difficult to assess   Tongue Function (Oral Motor)   Tongue ROM (Oral Motor) unable/difficult to assess   Jaw Function (Oral Motor)   Jaw Function (Oral Motor) unable/difficult to assess   Cough/Swallow/Gag Reflex (Oral Motor)   Soft Palate/Velum (Oral Motor) unable/difficult to assess   Volitional Throat Clear/Cough (Oral Motor) unable/difficult to assess   Volitional Swallow (Oral Motor) unable/difficult to assess   Vocal Quality/Secretion Management (Oral Motor)   Vocal Quality (Oral Motor) WFL   Secretion Management (Oral Motor) WNL   General " Swallowing Observations   Past History of Dysphagia Pt had VFSS completed 10/7/22 which revealed moderate oral dysphagia and mild pharyngeal dysphagia. Pt placed on soft/bite size diet (IDDSI 6). Results of video swallow study revealed pt being at risk for aspiration d/t penetration with some residuals in supraglottic space with thin and mildly thick consistencies. Additionally revealed that moderately thick liquid consistency caused increased stasis and that the consistency may further impact intake since pt demonstrates failure to thrive. Therefore the decision of pt being placed on moderately thick liquids with water protocol versus thin liquids with close monitoring of respiratory status was left up to the SLP at [facility] facility.   Respiratory Support (General Swallowing Observations) none   Current Diet/Method of Nutritional Intake (General Swallowing Observations, NIS) thin liquids (level 0);regular diet   Swallowing Evaluation Clinical swallow evaluation   Clinical Swallow Evaluation   Feeding Assistance dependent   Clinical Swallow Evaluation Textures Trialed thin liquids;pureed   Clinical Swallow Eval: Thin Liquid Texture Trial   Mode of Presentation, Thin Liquids straw;self-fed   Volume of Liquid or Food Presented 6 oz of tea   Oral Phase of Swallow WFL   Pharyngeal Phase of Swallow intact   Diagnostic Statement WFL with thin liquid consistency. No overt signs or symptoms of aspiration.   Clinical Swallow Evaluation: Puree Solid Texture Trial   Mode of Presentation, Puree spoon;other (see comments)  (fed by family)   Volume of Puree Presented 4 oz of applesauce - pt only willing to take one bite d/t pain   Oral Phase, Puree WFL   Pharyngeal Phase, Puree intact   Diagnostic Statement WFL with puree solid consistency. No overt signs or symptoms of aspiration.   Esophageal Phase of Swallow   Patient reports or presents with symptoms of esophageal dysphagia No   Swallowing Recommendations   Diet Consistency  Recommendations thin liquids (level 0);pureed (level 4)   Supervision Level for Intake 1:1 supervision needed   Mode of Delivery Recommendations bolus size, small;slow rate of intake   Swallowing Maneuver Recommendations alternate food and liquid intake   Monitoring/Assistance Required (Eating/Swallowing) stop eating activities when fatigue is present   Recommended Feeding/Eating Techniques (Swallow Eval) maintain upright sitting position for eating  (Have pt in most upright position possible without pain)   Medication Administration Recommendations, Swallowing (SLP) Pt okay for crushed pills in puree (only if pt is able to tolerate a somewhat upright position. Consider other means if pt is unable to tolerate upright position.   Instrumental Assessment Recommendations instrumental evaluation not recommended at this time   General Therapy Interventions   Planned Therapy Interventions Dysphagia Treatment   Dysphagia treatment Modified diet education;Instruction of safe swallow strategies   Clinical Impression   Criteria for Skilled Therapeutic Interventions Met (SLP Eval) Yes, treatment indicated   SLP Diagnosis Mild oropharyngeal dysphagia   Risks & Benefits of therapy have been explained evaluation/treatment results reviewed;care plan/treatment goals reviewed;risks/benefits reviewed;participants voiced agreement with care plan;participants included;current/potential barriers reviewed;patient;son   Clinical Impression Comments Clinical swallow evaluation completed per MD orders. Pt awake and laying down upon arrival. Pt position into slightly upright position, however, pt unable to tolerate a more upright position d/t pain. Pt unable/had difficulty following commands. Oral mechanism remarkable for being edentulous upon baseline. All other musculature was difficult to assess d/t limited command following and increased pain.     Pt fed PO trials as detailed above. Swallowing function appeared adequate and functional  with puree solids and thin liquids. Laryngeal elevation appeared strong. No overt signs or symptoms of aspiration noted. Soft/bite sized solids or regular solids were unable to be evaluated at this time d/t pts pain.     Son reports that the soft/bite size food (eggs) that the pt ordered this morning was difficult for pt. Son reports that pt demonstrated difficulty with chewing and was not swallowing the food. Therefore, recommend a puree diet (IDDSI 4) with thin liquids (0). Recommend pt sit in the most upright position possible without pain while taking small bites/sips, moving at a slow pace and alternating solids/liquids. Pt okay for straws and pt okay for crushed pills in puree (only if pt is able to tolerate a somewhat upright position). Pt's positioning limitations may impact swallowing safety as well; recommend pt be positioned as upright as tolerated.   SLP Total Evaluation Time   Eval: oral/pharyngeal swallow function, clinical swallow Minutes (24670) 11   SLP Goals   Therapy Frequency (SLP Eval) 5 times/wk   SLP Predicted Duration/Target Date for Goal Attainment 05/19/23   SLP Goals Swallow   SLP: Safely tolerate diet without signs/symptoms of aspiration Soft & bite sized diet;Thin liquids;With use of compensatory swallow strategies;With assistance/supervision   Interventions   Interventions Quick Adds Swallowing Dysfunction   SLP Discharge Planning   SLP Plan diet tolerance, trial upgrades when appropriate, family/pt education   SLP Discharge Recommendation Transitional Care Facility   SLP Rationale for DC Rec Pt is below baseline for oropharyngeal swallowing and diet level.   SLP Brief overview of current status  Recommend puree diet (IDDSI 4) with thin liquids (0). Recommend pt sit in most upright position possible without pain while taking small bites/sips, moving at a slow pace and alternating solids/liquids. Pt okay for straws and pt okay for crushed pills in puree (only if pt is able to tolerate a  somewhat upright position).   Total Session Time   Total Session Time (sum of timed and untimed services) 11

## 2023-05-10 NOTE — PROGRESS NOTES
Care Management- Family Conference Note    Length of Stay (days): 2    Expected Discharge Date: 05/12/2023     Concerns to be Addressed:   Family conference        Anticipated Discharge Disposition:  TBD, per MD anticipate a need to transfer the patient to a higher level of care to access additional surgery options.        Patient/family educated on Medicare website which has current facility and service quality ratings:  Not addressed during this confersation  Education Provided on the Discharge Plan:  Anticipated need for transfer  Patient/Family in Agreement with the Plan: Yes     Referrals Placed by CM/SW:  None at this time    Additional Information:  A family conference was conducted with Court (daughter), Camron (son), and Jonas (son) all present in person, Daughter Valerie was called but we reached her voicemail and left a message. Also in attendance was Dr. Birmingham (Hospitalist) Ranjan (6th floor Nurse Manager) Libertad (Palliative Care) Evie (Palliative Care SW) and writer (manager of care management).    Dr. Leiva reviewed the patient's current health issues and diagnosis with the family present he also discussed that the patient has several wounds and will likely need surgery that can't be completed here at [facility]. She is currently on antibiotics. The medical team would be recommending to transfer the patient to a facility that can meet her surgical needs, likely the [facility], however that man not happen today and it may take a few days, but we will continue to support the patient's nutritional needs and treat her with antibiotics. The family was in support of the transfer.     Libertad discussed that we would continue to support the patient doing the best we can and would like to have two spokes people to identify to communicate with. It was determined that this would be Valerie and Court. Court's number was verified from the face sheet. Additional information was discussed with the family  regarding the patient's preferences on food and discussion on visiting. One issue that was brought forward was that the patient is having issues chewing and the question was asked if the patient has dentures and Court verified that she does. This writer will call over to [facility] to see if they have the dentures and if so can deliver them to the hospital.     Will continue to keep the family informed with the two decision makers.       Gina García RN, MSN, PHN, CMGT-BC  Manager of Inpatient Care Management

## 2023-05-10 NOTE — PROGRESS NOTES
The purpose of this note, including any accompanying recommendation, is advisory only concerning ethical principles and considerations related to this case. Determination of appropriate patient care decisions is the responsibility of the clinical team in consultation with patients and their decision makers and relevant institutional policy. Legal and policy questions should be addressed with Risk Management.    The purpose of this progress note is to document initial findings and analysis; final ethics consult pending developing information.     I discussed Ms. Preciado's circumstances with palliative care, nursing leadership, honoring choices, risk, and nursing.     It's my understanding that there is there is a current advance directive that appoints Valerie, the patient's daughter, to be her HCA. It's my understanding that as HCA, Valerie has been refusing appropriate medical intervention and has also been requesting discharge AMA.     It's also my understanding that in following discussion with Court, the patient's other daughter, that the advance directive that appoints Valerie is not appropriate, and that the preceding directive that appointed Court should have not been superseded. Court also notes that Valerie has been estranged from the family for years (~7). Court and Camron have indicated that they would agree to and support providing recommended medical treatment.     From a strictly ethics standpoint, the authority of surrogates is drawn from their ability to effectively represent what the patient would have wanted, and also the patient's best interests.     When patient s cannot decide for themselves we turn to surrogate decision makers to decide on their behalf. The criteria for an appropriate surrogate decision-maker are as follows:        The surrogate should know the patient s deeply held values and beliefs     The surrogate should be familiar with the patient s medical preferences      The surrogate should be willing and able to act as surogate       Because of these criteria, often a family member is in the best position to serve as a surrogate.      Surrogates must be able to use substituted judgment - to set aside their own wishes and make medical decisions they believe the patient would have made if the patient had capacity. If the wishes of the patient are relatively unclear, it is appropriate for surrogates to use the best interest standard - or to act in the medical best interest of the patient.     In order to have ethical justification to replace an appropriately positioned surrogate, the care team should have clear evidence that the surrogate is unable to act in the patient's best interest, is actively causing harm to the patient, and cannot represent the patient's wishes and values. The care team should document events clearly in the chart, which will support the case that this surrogate should be replaced.?      In this case, from a strictly ethics perspective, there is accumulating evidence that Valerie is an inappropriate surrogate decision maker and should be replaced. She appears to be making decision inconsistent with what the patient would want, and/or adverse to her interests.     Here, a surrogate could choose between different ethical options. The first is full restorative care, with a coherent and medically sound plan of care. The second would be a palliative focused plan of care that manages symptoms effectively, as a part of refusing unwanted treatment inconsistent with patient goals and values. Either could be coherent, medically appropriate plans of care that align with patient goals and values.     What is clearly unethical is a pattern of choices that exposes the patient to avoidable suffering and harm and doesn't appear to advance any cohere patient interests or values. A blanket refusal of treatment, even potentially pain management, and unsafe and unresolved  disposition is ethically problematic, and reveals a potential defect or impairment in the surrogates ability to be an effective decision for the patient.     At present, the care team can constrain options to only those that are ethically defensible, with a priority to providing medically indicated restorative care. The burden of proof to suspend treatments that are relativey routine, with a low level of uncertainty and burden and a high likelihood of effectiveness and a good outcome (e.g. antibiotic treatment, treatment of the wound,) as a part of a patients goals and values to refuse unwanted treatment is relatively high. There should be high certainty that the patient would have specifically wanted to e.g. refuse antibiotics, - ideally it would have been specifically documented.     Ethics will remain available to support     Celso Dickinson JD, MPH, MA, CAROLA-C                                   Clinical Ethics Lead, [email or web address]     Please contact the service if we can be of any further assistance, service pager [phone].

## 2023-05-10 NOTE — CONSULTS
"CLINICAL NUTRITION SERVICES  -  ASSESSMENT NOTE      Recommendations:   - Diet per SLP/MD.  - Continue daily MVI/M as ordered.  - Terrence mixed into apple juice, Magic Cup, and Gelatein supplement offerings 1x/day each.    - Continue monitoring PO intakes acutely during admission though of note, patient had short-term EN via nasal tube during recent hospital admit given poor PO trends and from a wound healing perspective.  Per discussion w/ LTC RD, patient has been eating poorly over the past ~month at their facility as well and since that acute discharge.  Deferring nutrition-related POC to MD/Palliative care teams based on goals of care.    - Role for consideration of an appetite stimulant?      MALNUTRITION:  % Weight Loss: Unable to determine, see discussion below  % Intake:  </= 50% for >/= 1 month (severe malnutrition)  Subcutaneous Fat Loss:  Orbital region mild depletion and Upper arm region moderate depletion   Muscle Loss:  Temporal region mild depletion, Clavicle bone region mild to moderate depletion and Acromion bone region mild to moderate depletion --> not using LEs as wheelchair bound baseline  Fluid Retention: None documented    Malnutrition Diagnosis: Severe malnutrition  In Context of:  Acute illness or injury with underlying chronic illness or disease          REASON FOR ASSESSMENT  Cristal Preciado is a 73 year old female seen by Registered Dietitian for Provider Order - \"severe malnutrition\".     PMH of: DMII, CAD, CKD stage 3, pressure injury, recent sepsis with osteo, CVA, GERD.    Admit 2/2: \"Worsening of sacral wound\", concern for necrotizing fascitis, transaminitis.    NUTRITION HISTORY  - Information obtained from review of chart including previous RD notes, and discussion w/ LTC RD.  Met w/ patient and son who was at bedside though asked to come back when another family member/daughter present this afternoon.  When went back to room, was alerted by nursing that patient's family was in a care " "conference.  - Reviewed RD notes from 2/2023-3/2023 admission.  Patient had a period (weeks long) of inadequate oral intakes, received oral supplements, and eventually had a nasal tube placed for nutrition support and wound healing.  Pt's FT was removed on 3/15 as PO intakes had improved.  - From LTC facility.  Discussed PO intake and wt trends w/ [facility] LTC RD as well.  Patient has been at their facility for ~1 month and has had poor PO intakes the entire time.  Mostly 0-25% of meals w/ 0-50% intakes overall.  She requires feeding assistance and appears overall lack of appetite, despite consistently being encouraged and being offered supplements.  - Most recently patient has been receiving Magic Cup 1x/day and Expedite/wound healing 1x/day.   - Allergies: Walnuts.      CURRENT NUTRITION ORDERS  Diet Order:     Pureed/thins    Current Intake/Tolerance:  No flowsheet recordings during admit, limited timeframe since admission.  Noted SLP consulted/following for diet recs.      Obtained from Chart/Interdisciplinary Team:  - Reviewed palliative notes documented restorative goals of care and ethics consult for helping to determine and provide direction on surrogate decision maker  - WOCN following w/ documented stage 4 PI to sacrum, R hip, and unstageable PIs to BL heels + trauma vs pressure injury to R 5th toe   - Stooling patterns reviewed    ANTHROPOMETRICS  Height: 5' 7\"  Weight: 0 lbs 0 oz  Body mass index is 20.36 kg/m .  Weight Status:  Normal BMI  Weight History:  Wt Readings from Last 20 Encounters:   05/08/23 59 kg (130 lb)   05/04/23 59 kg (130 lb)   05/02/23 59 kg (130 lb)   04/24/23 59 kg (130 lb)   04/20/23 56.8 kg (125 lb 4.8 oz)   04/17/23 56.8 kg (125 lb 4.8 oz)   04/14/23 56.8 kg (125 lb 4.8 oz)   04/10/23 61.2 kg (135 lb)   04/05/23 67.3 kg (148 lb 4.8 oz)   03/30/23 67.3 kg (148 lb 4.8 oz)   03/27/23 67.3 kg (148 lb 4.8 oz)   03/23/23 67.3 kg (148 lb 4.8 oz)   03/19/23 58.8 kg (129 lb 10.1 oz) "   02/09/23 62.1 kg (137 lb)   01/13/23 65.9 kg (145 lb 3.2 oz)   12/20/22 69.1 kg (152 lb 4.8 oz)   12/08/22 71.7 kg (158 lb)   11/30/22 74.4 kg (164 lb)   09/28/22 71.7 kg (158 lb)   09/07/22 72.8 kg (160 lb 6.4 oz)     - No current documentation of edema.    - LTC RD reports wt of 148# from 3/21/2023 and most recent of 130# from 4/23/2023.    - Above wt trends a bit difficult to interpret as vast differences w/in period of days to weeks.  Reviewed RD note from 3/20/2023 which questioned the accuracy of 129#, so likely wt of 137-148# was more accurate at that time.  This would indicate at least 4% wt loss in the past month, potential to be greater but wts are just too unclear.      LABS  Labs reviewed including electrolyte trends      MEDICATIONS  Medications reviewed including daily MVI/M      ASSESSED NUTRITION NEEDS PER APPROVED PRACTICE GUIDELINES:    Dosing Weight 59 kg   Estimated Energy Needs: >/=2065 kcals >/=35 Kcal/Kg  Justification: repletion and wound healing  Estimated Protein Needs: 106-118 grams protein - 1.8-2 g pro/Kg  Justification: wound healing and preservation of lean body mass  Estimated Fluid Needs: per MD      NUTRITION DIAGNOSIS:  Malnutrition related to decreased oral intakes and wound healing needs, possible dysphagia/weakness component as evidenced by meeting <50% of nutrition needs for ~1 month per LTC RD, previous need for FT, concern for wt loss masked by unclear trends and fat/muscle loss.    NUTRITION INTERVENTIONS  Recommendations / Nutrition Prescription  See above.      Implementation  Nutrition education: Not appropriate at this time due to patient condition.    Medical Food Supplement: As above.     Multivitamin/Mineral: As above.     Collaboration and Referral of Nutrition care: Discussed POC briefly with RN and LTC RD.  Text-paged MD w/ FYI update on PO intakes PTA and given pt had FT at previous admit + restorative goals documented here so far.    Nutrition Goals  Patient to  consume at least 50% of meals or supplements TID to show improvement in PO intake trending vs nutrition-related POC per MD/Palliative care teams.      MONITORING AND EVALUATION:  Progress towards goals will be monitored and evaluated per protocol and Practice Guidelines          Yolanda Kitchen RDN, LD  Clinical Dietitian  3rd floor/ICU: [phone]  All other floors: [phone]  Weekend/holiday: [phone]  Office: [phone]

## 2023-05-10 NOTE — PROGRESS NOTES
[facility]  Infectious Disease Progress Note          Assessment and Plan:   Assessment & Plan  Cristal Preciado is a 73 year old female who was admitted on 5/8/2023.      Impression: 1 73-year-old female with underlying cognitive impairment, recent deterioration in her overall condition that included multiple decubitus wound formation, recent lengthy hospitalization in [place], debridement of necrotizing sacral wound unclear whether osteomyelitis but got an extended IV course in the hospital and oral antibiotics with a total of about 8 weeks of antibiotics off antibiotics 2 to 3 weeks and now with worsening wound  2 multiple other on infected looking decubitus wound  3 chronic cognitive impairment  4 diabetes mellitus  5 chronic kidney disease     REC 1 Zosyn for now, antibiotic role here is minimal not clear we have major necrotizing infection that she had had before but surgery to see and see if debridement indicated, await general surgery consult  2 long-term approach to this if there is osteomyelitis would likely involve plastic surgery type closure, whether overall condition will allow that to occur as the patient has to be able to be cooperative and also pressure relieved so does not have new wound formation which has been an ongoing issue for her.  Likely a poor candidate for this intervention  3 unless evolves into sepsis or is surprisingly bacteremic antibiotic role alone  here is minimal, does have a fever to 101 so at least some antibiotics, await any surgical findings if we do an I&D to direct how much antibiotics but again the role in this process was unlikely to be curative and is done in [place] nor here        Interval History:   no new complaints looks comfortable not very communicative, no particular pain, temp to 101, blood cultures so far negative              Medications:       aspirin  81 mg Oral or NG Tube Daily     atorvastatin  80 mg Oral or NG Tube QPM      "carboxymethylcellulose PF  1 drop Both Eyes TID     dorzolamide-timolol  1 drop Both Eyes BID     DULoxetine  90 mg Oral or NG Tube QAM     insulin aspart  1-7 Units Subcutaneous TID AC     insulin aspart  1-5 Units Subcutaneous At Bedtime     latanoprost  1 drop Both Eyes At Bedtime     mirtazapine  15 mg Oral or NG Tube At Bedtime     multivitamin w/minerals  1 tablet Oral or Feeding Tube Daily     oxyCODONE  2.5 mg Oral Q8H     pantoprazole  40 mg Oral QAM AC     piperacillin-tazobactam  3.375 g Intravenous Q6H     sodium chloride (PF)  10 mL Intracatheter Q8H     sodium chloride (PF)  10 mL Intracatheter Q8H                  Physical Exam:   Blood pressure (!) 147/65, pulse 104, temperature 97.7  F (36.5  C), temperature source Temporal, resp. rate 16, height 1.702 m (5' 7\"), SpO2 100 %, not currently breastfeeding.  Wt Readings from Last 2 Encounters:   05/08/23 59 kg (130 lb)   05/04/23 59 kg (130 lb)     Vital Signs with Ranges  Temp:  [97.7  F (36.5  C)-101.1  F (38.4  C)] 97.7  F (36.5  C)  Pulse:  [104-127] 104  Resp:  [16] 16  BP: (114-147)/(52-65) 147/65  SpO2:  [100 %] 100 %    Constitutional: Awake, alert, , no apparent distress   Lungs: Clear to auscultation bilaterally, no crackles or wheezing   Cardiovascular: Regular rate and rhythm, normal S1 and S2, and no murmur noted   Abdomen: Normal bowel sounds, soft, non-distended, non-tender   Skin: No rashes, no cyanosis, no edema   Other: wds same          Data:   All microbiology laboratory data reviewed.  Recent Labs   Lab Test 05/09/23  1055 05/08/23  1553 05/08/23  0740   WBC 10.3 10.9 9.4   HGB 9.0* 8.1* 9.7*   HCT 28.6* 28.0* 32.5*   MCV 87 92 91    473* 388     Recent Labs   Lab Test 05/10/23  0655 05/09/23  1055 05/08/23  1553   CR 1.10* 1.05* 1.23*     Recent Labs   Lab Test 02/16/23  1431   *     Recent Labs   Lab Test 08/06/19  0835 04/29/15  1420   CULT No growth No growth after 4 weeks       "

## 2023-05-10 NOTE — PLAN OF CARE
Goal Outcome Evaluation:       Afebrile, VS stable except tachycardia. No IV access, denies pain. Sacral and right hip dressings in place. Yellow, purulent drainage from sacral wound. Pt alone in room,  No visit from family. Will continue to monitor.

## 2023-05-11 NOTE — PLAN OF CARE
"Goal Outcome Evaluation:    Vital Signs: /51 (BP Location: Right arm)   Pulse 86   Temp 98.7  F (37.1  C) (Temporal)   Resp 20   Ht 1.702 m (5' 7\")   SpO2 100%   BMI 20.36 kg/m            Pt refused pills and eye drop. Abx given and midline flushed. Turned and repositioned, repeated requests to be on her left side. Pt is total feed. Poor appetite, refused  to eat supper. Will continue to monitor.                  "

## 2023-05-11 NOTE — PLAN OF CARE
Goal Outcome Evaluation:                    Midline in place and abx infused without complications. Scheduled oxy given for pain and other meds as well - all given crushed in apple sauce. Dressing change completed per WOC instructions. Cornejo in place for wound healing. Pt was repositioned often but could only sustain off loading of left side for a few minutes before returning to left side d/t right side wounds. Pulsate mattress in place. Diet changed to puree per speech.    Will continue to provide supportive cares

## 2023-05-11 NOTE — PROGRESS NOTES
[facility]    Medicine Progress Note - Hospitalist Service    Date of Admission:  5/8/2023    Assessment & Plan     Cristal Preciado is a 73 year old female admitted on 5/8/2023. She has a past medical history significant for diabetes mellitus type 2, hypertension, hyperlipidemia, coronary artery disease, chronic sacral decubitus ulcer, recent sepsis due to osteomyelitis requiring 8 weeks of antibiotics, previous stroke, GERD.  She was sent to emergency room due to concern for possible worsening of her chronic sacral wound.     Acute worsening of chronic sacral pressure wound.  Chronic right hip wound.  Chronic bilateral heel ulcers.  -Hospitalized middle of February for sepsis due to sacral osteomyelitis and possible necrotizing fasciitis.  -Completed 8 weeks of antibiotics in the middle of April.  -Has been at TCU.  -Staff at TCU concerned that sacral wound might be getting worse.  -Check CT pelvis.  -CRP elevated at greater than 200.  -Recheck CRP intermittently.  -Appreciate infectious disease consult.  -Appreciate orthopedic surgery consult.  -Appreciate wound nurse consult.  -Continue IV Zosyn.  -Discussed case with transfer line at [facility] for possible transfer on 5/10.  -Very poor surgical candidate.  -Will try to optimize with antibiotics at our facility for now.  -Poor short and long-term prognosis.     Diabetes mellitus type 2.  -Had recently been taking off scheduled insulin due to episodes of hypoglycemia.  -Metformin stopped.  -Continue aspart insulin sliding scale as needed.     Hypertension.  Hyperlipidemia.  Coronary artery disease.  Previous stroke.  -Has been mildly hypotensive here at times.  -Hold metoprolol.  -Continue aspirin 81 mg a day.  -Hold atorvastatin.     Chronic kidney disease stage IIIa.  -Creatinine appears to be at baseline.  -Avoid nephrotoxins as able.  -Creatinine stable again today at 1.1.  -Recheck metabolic panel  intermittently.     Transaminitis.  -Mild.  -Abdominal ultrasound shows gallbladder sludge.  -Recheck LFTs intermittently.  -Hold atorvastatin for now.     Chronic anemia.  -Hemoglobin mildly lower than recent baseline.  -No known acute hemorrhage.  -Hemoglobin has been stable.  -Recheck CBC intermittently.     GERD.  -Continue pantoprazole 40 mg a day.     Dysphagia.  -Appreciate speech pathology input.  -Dysphagia diet.     Severe protein/calorie malnutrition.  -Appreciate registered dietitian input.  -Continue to encourage good caloric intake.  -Continue dietary supplements.       Diet: Combination Diet Pureed Diet (level 4); Thin Liquids (level 0)  Snacks/Supplements Adult: Other; Magic Cup offerings w/ lunch + High protein Gelatein w/ breakfast + 1 pkt Terrence mixed w/ apple juice w/ dinner; With Meals    DVT Prophylaxis: Pneumatic Compression Devices  Cornejo Catheter: PRESENT, indication: Wound Healing  Lines: PRESENT             Cardiac Monitoring: None  Code Status: Full Code      Clinically Significant Risk Factors           # Hypercalcemia: corrected calcium is >10.1, will monitor as appropriate    # Hypoalbuminemia: Lowest albumin = 1.5 g/dL at 5/8/2023  7:00 AM, will monitor as appropriate     # Hypertension: Noted on problem list       # DMII: A1C = N/A within past 6 months, PRESENT ON ADMISSION   # Severe Malnutrition: based on nutrition assessment, PRESENT ON ADMISSION          Andrade Leiva DO  Hospitalist Service  [facility]  Securely message with NotesFirst (more info)  Text page via [employer] Paging/Directory   ______________________________________________________________________    Interval History   Seems confused.  Denies chest pain, shortness of breath, fevers, chills, nausea, or vomiting.    Physical Exam   Vital Signs: Temp: 97.4  F (36.3  C) Temp src: Temporal BP: (!) 117/35 Pulse: 87   Resp: 18 SpO2: 100 % O2 Device: None (Room air)    Weight: 0 lbs 0 oz    Gen:  NAD,  A&Ox1 to self.  Trouble with place and time.  Eyes:  PERRL, sclera anicteric.  OP:  MMM, no lesions.  Neck:  Supple.  CV:  Regular, no murmurs.  Lung:  CTA b/l, normal effort.  Ab:  +BS, soft.  Skin:  Warm, dry to touch.  Wound dressings not removed.  Ext:  Mild non pitting edema LE b/l.      Medical Decision Making       36 MINUTES SPENT BY ME on the date of service doing chart review, history, exam, documentation & further activities per the note.      Data     I have personally reviewed the following data over the past 24 hrs:    6.6  \   9.7 (L)   / 348     137 108 (H) 38.7 (H) /  148 (H)   4.3 16 (L) 1.08 (H) \       ALT: 93 (H) AST: 202 (H) AP: 764 (H) TBILI: 0.3   ALB: 2.0 (L) TOT PROTEIN: 6.4 LIPASE: N/A       Imaging results reviewed over the past 24 hrs:   No results found for this or any previous visit (from the past 24 hour(s)).

## 2023-05-11 NOTE — PROGRESS NOTES
"   05/11/23 1116   Child Life   Location Med/Surg  ([facility] Ortho/Spine)   Intervention Referral/Consult;Initial Assessment;Family Support   Family Support Comment Phone call with pt's daughter, Court re: support for her daughter (Martin-age 7 yo). Court also reported that she has a 17 yo son, Logan, who is too afraid of pt's condition to come to the hospital.   Major Change/Loss/Stressor/Fears medical condition, self   Outcomes/Follow Up Continue to Follow/Support;Referral  (Refer to ED CCLS t lamine support for evening bedside visit)       Child Family Life Children of Adult Patient Note       Child Life Staff Spoke With:  Adult child of patientCourt    Contact made   by phone    Children impacted by patient's hospitalization:  Child name: Martin, age: 7 yo, relationship to patient: granddaughter, lives with: parents  Child name: Logan, age: 17 yo, relationship to patient: grandson, lives with: parents    Services Provided:  CCLS spoke with Court via phone to introduce self and Child Life Children of Adult Patients (CAP) services, assess coping and needs, and to offer support. Court noted that her 7 yo daughter, Martin, overheard some \"adult conversations\" recently after a nursing home \"tried to force hospice\" on the patient. Since those conversations, Martin has appeared anxious and asks many questions about patient's condition and patient possibly dying. Court shared that during patient's previous hospitalization at [facility], Martin visited patient and appeared to cope well: she ran into the room, quickly and easily engaged with patient normally. However, Court mentioned that she is worried about Martin's visit to the hospital due to recent questions about death as well as the fact that patient is \"different right now,\" more fatigued and not as interactive. \"Martin is used to shopping, playing, and talking with my mom, now she can't do that.\"     CCLS inquired about other " "children in patient's life. Court shared that she also has a 17 yo son, Logan and that he is \"terrified to come to the hospital,\" and very worried about his grandmother's condition. CCLS shared that we are happy to help support Logan as well, helping to offer developmentally appropriate description of hospital environment and patient's condition as well as preparation and coping support. Court declined at this time, stating that her primary concern at this time is Martin who is willing and wanting to come to visit her grandmother at the hospital.     Martin will be coming to visit today after 5pm with her mother. CCLS offered child life support for time of visit. Martin likes coloring, basic math, cartoons, and Roblox. Court is hopeful that a developmentally appropriate and open conversation about patient's condition and the idea of death (while not imminent) would be helpful for Martin.     Interventions Intended to Promote:  Assistance in supporting child(sommer) in family  Increased understanding of patient's condition for child(sommer)  Family centered care  Normalizing hospital environment  Validation of emotions and concerns  Increased coping of impacted child(sommer)  Connection between patient and child(sommer)        Follow Up:  Please notify Child Life when Court and Martin arrive this evening via [employer] or text page to [phone].    Plan of Care:  [x]Child Life Services have been introduced and offered.  [x]Developmental and psychosocial needs are being assessed on an on-going basis.   [x]Continue to promote Family Centered Care      "

## 2023-05-11 NOTE — PROGRESS NOTES
[facility]  Infectious Disease Progress Note          Assessment and Plan:   Assessment & Plan  Cristal Preciado is a 73 year old female who was admitted on 5/8/2023.      Impression: 1 73-year-old female with underlying cognitive impairment, recent deterioration in her overall condition that included multiple decubitus wound formation, recent lengthy hospitalization in [place], debridement of necrotizing sacral wound unclear whether osteomyelitis but got an extended IV course in the hospital and oral antibiotics with a total of about 8 weeks of antibiotics off antibiotics 2 to 3 weeks and now with worsening wound  2 multiple other on infected looking decubitus wound  3 chronic cognitive impairment  4 diabetes mellitus  5 chronic kidney disease     REC 1 Zosyn for now, antibiotic role here is minimal not clear we have major necrotizing infection that she had had before but surgery to see and see if debridement indicated, await general surgery consult  2 long-term approach to this if there is osteomyelitis would likely involve plastic surgery type closure, whether overall condition will allow that to occur as the patient has to be able to be cooperative and also pressure relieved so does not have new wound formation which has been an ongoing issue for her.  Likely a poor candidate for this intervention  3 unless evolves into sepsis or is surprisingly bacteremic antibiotic role alone  here is minimal, does have a fever to 101 so at least some antibiotics, await any surgical findings if we do an I&D to direct how much antibiotics but again the role in this problem is limited and no anticipation of cure, the failure of the antibiotics in [place] not really a suprise        Interval History:   no new complaints looks comfortable not very communicative, no particular pain, temp to 101, blood  fever now down, cultures all              Medications:       aspirin  81 mg Oral or NG Tube Daily      "carboxymethylcellulose PF  1 drop Both Eyes TID     dorzolamide-timolol  1 drop Both Eyes BID     DULoxetine  90 mg Oral or NG Tube QAM     insulin aspart  1-7 Units Subcutaneous TID AC     insulin aspart  1-5 Units Subcutaneous At Bedtime     latanoprost  1 drop Both Eyes At Bedtime     mirtazapine  15 mg Oral or NG Tube At Bedtime     multivitamin w/minerals  1 tablet Oral or Feeding Tube Daily     oxyCODONE  2.5 mg Oral Q8H     pantoprazole  40 mg Oral QAM AC     piperacillin-tazobactam  3.375 g Intravenous Q6H     sodium chloride (PF)  10 mL Intracatheter Q8H     sodium chloride (PF)  10 mL Intracatheter Q8H                  Physical Exam:   Blood pressure (!) 117/35, pulse 87, temperature 97.4  F (36.3  C), temperature source Temporal, resp. rate 18, height 1.702 m (5' 7\"), SpO2 100 %, not currently breastfeeding.  Wt Readings from Last 2 Encounters:   05/08/23 59 kg (130 lb)   05/04/23 59 kg (130 lb)     Vital Signs with Ranges  Temp:  [97.4  F (36.3  C)-98.7  F (37.1  C)] 97.4  F (36.3  C)  Pulse:  [86-87] 87  Resp:  [18-20] 18  BP: (108-117)/(35-51) 117/35  SpO2:  [100 %] 100 %    Constitutional: Awake, alert, , no apparent distress   Lungs: Clear to auscultation bilaterally, no crackles or wheezing   Cardiovascular: Regular rate and rhythm, normal S1 and S2, and no murmur noted   Abdomen: Normal bowel sounds, soft, non-distended, non-tender   Skin: No rashes, no cyanosis, no edema   Other: wds same          Data:   All microbiology laboratory data reviewed.  Recent Labs   Lab Test 05/11/23  0803 05/09/23  1055 05/08/23  1553   WBC 6.6 10.3 10.9   HGB 9.7* 9.0* 8.1*   HCT 30.7* 28.6* 28.0*   MCV 86 87 92    419 473*     Recent Labs   Lab Test 05/11/23  0803 05/10/23  0655 05/09/23  1055   CR 1.08* 1.10* 1.05*     Recent Labs   Lab Test 02/16/23  1431   *     Recent Labs   Lab Test 08/06/19  0835 04/29/15  1420   CULT No growth No growth after 4 weeks       "

## 2023-05-11 NOTE — PLAN OF CARE
Goal Outcome Evaluation:        End of Shift Summary.  For vital signs and complete assessments, please see documentation flowsheets.      Pertinent assessments: Disoriented X4. Pt. Slept most of the shift. Repositioned with assist of 2 with lift. VSS. On RA. Midline is in place and saline lock between IV ABX. Dressing change completed per WOC instructions. Cornejo is in place for wound healing with 250CC output. Pulsate mattress in place. Pureed diet with very poor appetite. Pt. takes meds with apple sauce.     Major Shift Events:     PRN Oxycodone given X1.     CT pelvis is done today.     Plan (Upcoming Events): Pain management, IV ABX, Pureed diet.   Discharge/Transfer Needs: TBD     Bedside Shift Report Completed : Y  Bedside Safety Check Completed: Y

## 2023-05-12 NOTE — PROGRESS NOTES
The purpose of this note, including any accompanying recommendation, is advisory only concerning ethical principles and considerations related to this case. Determination of appropriate patient care decisions is the responsibility of the clinical team in consultation with patients and their decision makers and relevant institutional policy. Legal and policy questions should be addressed with Risk Management.    This is a progress note to follow-up on conflicts and defects in surrogate decision making for Ms. Preciado.     I participated in a conference with members of the care team and risk this morning.     Several important points of clarification are worth notin) In following advice from risk, since the validity and legitimacy of the most recent advance directive is in dispute, it is appropriate to engage the patient's children on the basis of being next of kin, rather than solely on the basis of authorization by the directive.     2) There are 2 competing potential surrogates on the basis of kinship; daughter Valerie and daughter Court. Valerie has been engaged, but has demonstrated a continuing and well documented pattern of impaired decision making and defective shared decision making. In terms of communication, she has been in inconsistent participant in communication and a pattern of inaccurate or false recall regarding communication has been documented. In terms of appropriate share decision making, there is a pattern of inappropriateness or impairment in several key areas. Surrogate decision makers should have decision making capacity commensurate to the patient's issue. That minimally includes an understanding of the patient's condition and prognosis, an understanding of the risks and benefits of both treatment and non-treatment, knowledge of the patients goals and interests, and finally the ability to align care choices to treatment options that advance the patients goals/values and interests.  Here, there is clear evidence that Valerie does not have a meaningful or correct understanding of her mother's condition and prognosis, that she is unable to understand the benefits/risks of treatment and non-treatment, and finally has made a series of choices that are clearly inconsistent with both subjective goals that she herself would suggest are her mother's goals, and objective medical interests that that care team has professional obligations to sustain.     It would be ethically problematic to allow avoidable harms to happen to Ms. Preciado merely on the basis of acceding to the choices or demands of Valerie, particularly when there is evidence and a record of compromised and impaired surrogate decision making.     From an ethics standpoint, care decisions should be made in collaboration with Court. As always, there is no ethical obligation to provide treatment that is not medically indicated, and Court may choose from a range of medically indicated options that she understands may advance Cristal's interests and goals.     Procedurally, Valerie should still be included and given notice and opportunity to participate appropriately. She should be made aware of the errors in understanding and participation, boundaries and responsibilities for participation, and that ongoing pattern of misunderstanding or inappropriate participation means that the care team cannot honor her choices.     Celso Dickinson JD, MPH, MA, CAROLA-C                                   Clinical Ethics Lead, [facility]     Please contact the service if we can be of any further assistance, service pager [phone].

## 2023-05-12 NOTE — PROGRESS NOTES
[facility]  Infectious Disease Progress Note          Assessment and Plan:   Assessment & Plan  Cristal Preciado is a 73 year old female who was admitted on 5/8/2023.      Impression: 1 73-year-old female with underlying cognitive impairment, recent deterioration in her overall condition that included multiple decubitus wound formation, recent lengthy hospitalization in [place], debridement of necrotizing sacral wound unclear whether osteomyelitis but got an extended IV course in the hospital and oral antibiotics with a total of about 8 weeks of antibiotics off antibiotics 2 to 3 weeks and now with worsening wound  2 multiple other on infected looking decubitus wound  3 chronic cognitive impairment  4 diabetes mellitus  5 chronic kidney disease     REC 1 Zosyn , antibiotic role here is minimal, not clear we have major necrotizing infection that she had had before but surgery following  Not really septic  2 long-term approach to this if there is osteomyelitis would likely involve plastic surgery type closure, whether overall condition will allow that to occur as the patient has to be able to be cooperative and also pressure relieved so does not have new wound formation which has been an ongoing issue for her.  Likely a poor candidate for this intervention  3 unless evolves into sepsis or is surprisingly bacteremic antibiotic role alone  here is minimal, did have 1 fever to 101 so at least some antibiotics, await any surgical findings if we do an I&D to direct how much antibiotics but again the role in this problem is limited and no anticipation of cure, the failure of the antibiotics in [place] not really a surprise  4 now in ICU with apparent arrest, no obvious sepsis or infection cause to this family here discussed with them, continue antibiotics but level of care discussion obviously important  Call if issues this weekend        Interval History:   ,1  temp to 101, blood  fever now down,  "cultures all neg surgery noted witnessed arrest, now in the ICU, not particularly septic looking no new positive microbiology              Medications:       aspirin  81 mg Oral or NG Tube Daily     carboxymethylcellulose PF  1 drop Both Eyes TID     dorzolamide-timolol  1 drop Both Eyes BID     DULoxetine  90 mg Oral or NG Tube QAM     enoxaparin ANTICOAGULANT  40 mg Subcutaneous Q24H     insulin aspart  1-7 Units Subcutaneous TID AC     insulin aspart  1-5 Units Subcutaneous At Bedtime     latanoprost  1 drop Both Eyes At Bedtime     multivitamin w/minerals  1 tablet Oral or Feeding Tube Daily     oxyCODONE  2.5 mg Oral Q8H     pantoprazole  40 mg Per Feeding Tube QAM AC    Or     pantoprazole  40 mg Intravenous QAM AC     piperacillin-tazobactam  3.375 g Intravenous Q6H     sodium chloride (PF)  10 mL Intracatheter Q8H     sodium chloride (PF)  10 mL Intracatheter Q8H                  Physical Exam:   Blood pressure (!) 124/108, pulse (!) 124, temperature (!) 95.9  F (35.5  C), temperature source Temporal, resp. rate 26, height 1.702 m (5' 7\"), SpO2 (!) 30 %, not currently breastfeeding.  Wt Readings from Last 2 Encounters:   05/08/23 59 kg (130 lb)   05/04/23 59 kg (130 lb)     Vital Signs with Ranges  Temp:  [95.9  F (35.5  C)-98  F (36.7  C)] 95.9  F (35.5  C)  Pulse:  [] 124  Resp:  [10-35] 26  BP: ()/() 124/108  MAP:  [41 mmHg-79 mmHg] 79 mmHg  Arterial Line BP: ()/(1-48) 154/48  FiO2 (%):  [100 %] 100 %  SpO2:  [30 %-100 %] 30 %    Constitutional: Awake, alert, , in ICU   Lungs: Clear to auscultation bilaterally, no crackles or wheezing   Cardiovascular: Regular rate and rhythm, normal S1 and S2, and no murmur noted   Abdomen: Normal bowel sounds, soft, non-distended, non-tender   Skin: No rashes, no cyanosis, no edema   Other: wds same          Data:   All microbiology laboratory data reviewed.  Recent Labs   Lab Test 05/12/23  0414 05/11/23  0803 05/09/23  1055   WBC 7.4 6.6 10.3 "   HGB 9.0* 9.7* 9.0*   HCT 30.6* 30.7* 28.6*   MCV 93 86 87    348 419     Recent Labs   Lab Test 05/12/23  1104 05/12/23  0414 05/11/23  0803   CR 1.25* 1.17* 1.08*     Recent Labs   Lab Test 02/16/23  1431   *     Recent Labs   Lab Test 08/06/19  0835 04/29/15  1420   CULT No growth No growth after 4 weeks

## 2023-05-12 NOTE — PROGRESS NOTES
Patient subsequently had hypotension with a blood pressure of 65/51 for which Levophed was started

## 2023-05-12 NOTE — PROGRESS NOTES
SPIRITUAL HEALTH SERVICES  SPIRITUAL ASSESSMENT Progress Note  [facility]. Unit ICU     REFERRAL SOURCE: referral from palliative APRN    Brief introduction to spiritual care on the unit, children Jonas and Court at bedside. Family did not identify needs at this time.    Plan: unit chaplains will follow up as indicated.    Susan Aguilar MDiv  Staff   [facility] routine referrals *[phone]  [facility] available 24/7 for emergent requests/referrals, either by having the on-call  paged or by entering an ASAP/STAT consult in Epic (this will also page the on-call ).

## 2023-05-12 NOTE — PROCEDURES
[facility]    Arterial line placement    Date/Time: 5/12/2023 7:04 AM    Performed by: Terry Dixon MD  Authorized by: Terry Dixon MD      UNIVERSAL PROTOCOL   Site Marked: NA  Prior Images Obtained and Reviewed:  NA  Required items: Required blood products, implants, devices and special equipment available    Patient identity confirmed:  Anonymous protocol, patient vented/unresponsive  NA - No sedation, light sedation, or local anesthesia  Confirmation Checklist:  Procedure was appropriate and matched the consent or emergent situation  Time out: Immediately prior to the procedure a time out was called    Universal Protocol: the Joint Commission Universal Protocol was followed    Preparation: Patient was prepped and draped in usual sterile fashion    ESBL (mL):  0  Indication: multiple ABGs hemodynamic monitoring  Location: left femoral      SEDATION    Patient Sedated: No      PROCEDURE DETAILS      Seldinger technique: Seldinger technique used    Number of Attempts:  1  Post-procedure:  Line sutured and dressing applied  CMS: unchanged    PROCEDURE  Describe Procedure: Dx:  Cardiac arrest.    Length of time physician/provider present for 1:1 monitoring during sedation: 0

## 2023-05-12 NOTE — PROGRESS NOTES
Care Management Follow Up    Length of Stay (days): 4        Additional Information:  Per family conference writer reached out to [facility] to obtain the patients dentures which were delivered to the patients room 5/11/23    Gina García RN

## 2023-05-12 NOTE — PROGRESS NOTES
Code blue called on patient at approx 0355. CPR started, see code blue flowsheet. Patient started on levophed for hypotension and subsequently transferred to ICU. Assumed care of patient after code blue. Difficult to obtain accurate pulse ox readings due to cool extremities and no palpable radial pulses; occassionally able to get pulse ox reading on forehead. No palpable pedal or radial pulses, MD aware. No right femoral pulse palpated; MD aware. Able to palpate left femoral pulse during and after code. Dr. Dixon called in to place arterial and central lines.

## 2023-05-12 NOTE — PLAN OF CARE
Problem: Malnutrition  Goal: Improved Nutritional Intake  Outcome: Not Progressing   Goal Outcome Evaluation: Pt intubated in ICU, not stable enough for EN at this time, RD will continue to monitor

## 2023-05-12 NOTE — PROGRESS NOTES
I received a call from radiology regarding a CT scan that had been ordered earlier in the day.  CT of abdomen and pelvis showed a minimal amount of soft tissue gas anterior to the proximal femur raising question of gas-forming infection.  I did review the patient's chart.  She had sacral osteomyelitis with possible necrotizing fasciitis in February of this year.  She had debridement surgery and 8 weeks of IV antibiotics.  She had ongoing sacral wound.  She was sent in from the nursing home on 5/8/2023 with concern about worsening wound.  There has been some discussion of the patient possibly needing plastic surgery and a flap.  The [facility] was contacted regarding transfer but patient was thought to be a poor candidate for surgery and there have been ongoing discussions with palliative care.  Accordingly, patient has remained here.  When I received the results of the CT I contacted surgery on-call.  Dr. Chappell graciously reviewed the CT and went and saw the patient.  I went and saw the patient as well.  She was comfortable and in no distress.  Dr. Chappell was able to express beka pus on exam.  I was not a party to the conversation but I am told that she called the daughter to discuss the possibility of surgery and that the daughter who is the power of  did not consent to surgery.  I also told that Dr. Chappell discussed the patient's condition with another daughter.  It sounds like the daughters are discussing options and planning and will contact Dr. Chappell regarding a decision to proceed with surgery if that is what they decide.

## 2023-05-12 NOTE — PROGRESS NOTES
[phone] paged for code blue intubation, room 902 1990 on arrival, 10 people in the room, MD intensivist running code, Chest comressions being applied, RT giving breaths with ambu/1.0 FIO2/mask, other RN staff assisting.  Pt severely obtunded unresponsive;  glidescope video laryngoscopy / #3 glidescope blade used and copious secretions suctioned from becki pharynx, 7.0 ETT placed under glidescope view and RT connects  ETT to Ambu/O2,  BBS equal auscultated bilaterally and EtCo2 Cap is positive x10 breaths; lungs sound crackly and white secretions seen in the ETT so 14 fr suction catheter used to suction down ETT for moderate amount of secretions;  RT secures ETT at 23 cm at the lips;  pt. Pulse is restored and Sao2 increasing, 's/70's;  report given to RN.

## 2023-05-12 NOTE — PROGRESS NOTES
Speech Language Therapy Discharge Summary    Reason for therapy discharge:    Change in medical status.  This patient has been transferred to the ICU and is now intubated    Progress towards therapy goal(s). See goals on Care Plan in [facility] electronic health record for goal details.  Goals not met.  Barriers to achieving goals:   limited tolerance for therapy.    Therapy recommendation(s):    No further therapy is recommended.

## 2023-05-12 NOTE — ANESTHESIA PROCEDURE NOTES
Airway       Patient location during procedure: ICU       Procedure Start/Stop Times: 5/12/2023 4:07 AM  Staff -        CRNA: Brain Castillo APRN CRNA       Performed By: CRNA  Consent for Airway        Urgency: emergent       Consent: The procedure was performed in an emergent situation.  Report Obtained from Primary Care Team       History regarding most recent potassium obtained: Yes       History regarding presence/absence of renal failure obtained:Yes       History regarding stroke/CVA obtained:Yes       History regarding presence/absence of NM disorder: YesIndications and Patient Condition       Indications for airway management: cardiovascular arrest       Mallampati: Not Assessed     Induction type:other (comments) (pt sevberely obtunded, receiving chest compressions)      Final Airway Details       Final airway type: endotracheal airway       Successful airway: ETT - single  Endotracheal Airway Details        ETT size (mm): 7.0       Successful intubation technique: video laryngoscopy       VL Blade Size: Glidescope 3       Grade View of Cords: 1       Adjucts: stylet       Position: Right       Measured from: lips       Secured at (cm): 23       Bite block used: Molar    Post intubation assessment        ETT secured, Vent settings by primary/ICU team, Primary/ICU team to review CXR, Sedation to be ordered by primary/ICU team and No apparent complications       Placement verified by: capnometry, equal breath sounds and chest rise        Number of attempts at approach: 1       Number of other approaches attempted: 0       Secured with: commercial tube eden (RT applies bite bolck/tube eden after ETT confirmation)       Ease of procedure: easy       Dentition: Unchanged    Medication(s) Administered   Medication Administration Time: 5/12/2023 4:07 AM    Additional Comments       [phone] paged for code blue intubation, room 460 6155 on arrival, 10 p[eople in the room, MD intensivist running code,  Chest comressions being applied, RT giving breaths with ambu/1.0 FIO2/mask, other RN staff assisting.  Pt severely obtunded unresponsive;  glidescope video laryngoscopy / #3 glidescope blade used and copious secretions suctioned from becki pharynx, 7.0 ETT placed under glidescope view and RT connects  ETT to Ambu/O2,  BBS equal auscultated bilaterally and EtCo2 Cap is positive x10 breaths; lungs sound crackly and white secretions seen in the ETT so 14 fr suction catheter used to suction down ETT for moderate amount of secretions;  RT secures ETT at 23 cm at the lips;  pt. Pulse is restored and Sao2 increasing, 's/70's;  report given to RN.

## 2023-05-12 NOTE — PROGRESS NOTES
7 pm to 4:45 am    Pt is alert to self, open eyes to voice. able to communicate with yes, no questions. Denies pain, on scheduled Oxycodone. Pt took her meds crushed with apple sauce. Dressings to sacrum and R hip intact with no drainage. bilat heels wounds with eschar, DIRK. No drainage, heel protectors on. BG at 2 am 144. Cornejo with cloudy urine draining well. Zosyn given per order. Pt took her Oxycodone close to 3 am. Had few sips of water. Pt asked writer to fix her 'glass', writer was not sure what pt was asking, checked for bm, pt was dry, repositioned.  At 3:45 am writer and charged nurse in pt room to set up air mattress. Pt has labored breathing, O2 started, RRT called. At 3:55 am code blue called. CPR started. Intubated. At 4:45 am pt transferred to ICU.

## 2023-05-12 NOTE — PROGRESS NOTES
Phone call placed to Valerie at [phone], there was answer. YANDY left to please call me as soon as possible. Palliative care number was left for her to return the phone call. Phone call placed to [phone]. YANDY is full I was not able to leave a message.     Electronically signed by  Libertad Romano CNP

## 2023-05-12 NOTE — CONSULTS
CLINICAL NUTRITION SERVICES - REASSESSMENT NOTE     Nutrition Prescription      Malnutrition Status:    % Intake: </=50% for >/= 1 month (severe)  % Weight Loss: Unable to assess--need updated weight  Subcutaneous Fat Loss:  Orbital region mild depletion and Upper arm region moderate depletion   Muscle Loss:  Temporal region mild depletion, Clavicle bone region mild to moderate depletion and Acromion bone region mild to moderate depletion --> not using LEs as wheelchair bound baseline  Fluid Accumulation/Edema: None noted  Malnutrition Diagnosis: Severe malnutrition in the context of acute on chronic illness--previously noted, remains current    Future/Additional Recommendations:  Enteral Nutrition - pending clinical status and goals of care, will order EN when appropriate- Vital High Protein @ goal of  50ml/hr  (1200ml/day)  will provide: 1200 kcals, 104 g PRO, 1003 ml free H20, 133 g CHO, and 0 g fiber daily.  + 1 pkt Prosource TF= 80 kcal/20 g protein    Total energy/protein provisions, all sources= 1280 kcal (~21 kcal/kg)/124 g protein (2.1 g/kg) per DW of 59 kg     Received consult to initiate tube feeding. Per rounds discussion, will not start EN today--pt is too unstable at this time. Will revisit going forward.    EVALUATION OF THE PROGRESS TOWARD GOALS   Diet: Pureed (level 4) with thin liquids (level 0) + Gelatein Plus with breakfast, Magic Cup with lunch, Terrence mixed with apple juice with dinner    Nutrition Support:  NG tube placed, nutrition support to be initiated today    Recent nutrition history: Poor PO off and on for months. Pt admitted 2/2023-3/2023 and required nutrition support at that time due to weeks of poor PO. Feeding tube was removed 3/15 d/t improved intakes and patient was discharged. Pt at [facility] for ~1 month prior to this admission and had poor PO the entire time per their RD.     Allergies: Walnuts     NEW FINDINGS   General:  Patient suffered a cardiac arrest yesterday and is  currently intubated and sedated in the ICU. General surgery is following for wounds, which reportedly need debridement, but patient is not stable enough at this time.     Weight: current weight appears to be stated (same weight in file several times since the end of April    Labs:     Electrolytes  Sodium (mmol/L)   Date Value   05/12/2023 137   08/06/2019 130 (L)     Potassium (mmol/L)   Date Value   05/12/2023 4.8   10/28/2021 3.7   08/06/2019 4.8     Magnesium (mg/dL)   Date Value   05/12/2023 2.3   08/06/2019 1.9     Phosphorus (mg/dL)   Date Value   05/12/2023 5.3 (H)   08/06/2019 2.1 (L)    Renal  Urea Nitrogen (mg/dL)   Date Value   05/12/2023 35.0 (H)   10/28/2021 33 (H)   08/06/2019 24     Creatinine (mg/dL)   Date Value   05/12/2023 1.25 (H)   08/06/2019 0.98     Inflammatory Markers  CRP Inflammation (mg/L)   Date Value   05/08/2023 216.40 (H)   03/13/2023 60.40 (H)   03/06/2023 41.60 (H)     WBC (10e9/L)   Date Value   08/06/2019 7.8     WBC Count (10e3/uL)   Date Value   05/12/2023 7.4     Albumin (g/dL)   Date Value   05/11/2023 2.0 (L)   03/08/2021 3.3 (L)   03/06/2014 4.0      Blood Glucose  Glucose (mg/dL)   Date Value   10/28/2021 129 (H)   08/06/2019 184 (H)     GLUCOSE BY METER POCT (mg/dL)   Date Value   05/12/2023 158 (H)     Hemoglobin A1C (%)   Date Value   01/03/2023 7.6 (H)   08/17/2022 6.9 (H)   06/17/2021 8.1 (H)   07/23/2019 6.6 (H)   10/24/2016 7.5 (H)   10/12/2015 7.5 (H)    Hepatic  ALT (U/L)   Date Value   05/11/2023 93 (H)   10/12/2015 39     AST (U/L)   Date Value   05/11/2023 202 (H)   03/06/2014 35     Alkaline Phosphatase (U/L)   Date Value   05/11/2023 764 (H)   03/06/2014 127     Bilirubin Total (mg/dL)   Date Value   05/11/2023 0.3   03/06/2014 0.2    Additional  Triglycerides (mg/dL)   Date Value   11/18/2021 180 (H)   08/21/2014 122     Ketones Urine (mg/dL)   Date Value   02/16/2023 Negative   08/06/2019 10 (A)        Lactate- 10.4 (trending up)    GI: LBM 5/11    Skin:  WOC and general surgery following for:  - Sacral PI, stage 4, present on admission, copious green malodorus purulent drainage and exposed bone--family previously refusing cares to site  - right hip PI, stage 4, present on admission--family previously refusing any cares to site, previously using VAC  - bilateral heels PIs, unstageable, chronic  - right lateral 5th toe, PI vs trauma injury    Meds:  - sliding scale insulin  - mvit w/min  - oxycodone  - pantoprazole    norepinephrine 0.34 mcg/kg/min (05/12/23 1253)     vasopressin 2.4 Units/hr (05/12/23 1200)        ASSESSED NUTRITION NEEDS PER APPROVED PRACTICE GUIDELINES  Dosing Weight: 59 kg--adjust as needed once patient reweighed  Estimated Energy Needs: 3827-2373 kcals/day (20 - 25 kcals/kg)  Justification: Vented  Estimated Protein Needs: + grams protein/day (1.5 - 2+ grams of pro/kg)  Justification: Hypercatabolism with critical illness, Increased needs and Wound healing  Estimated Fluid Needs: Per provider pending fluid status    MALNUTRITION  As noted above    Previous Goals   Patient to consume at least 50% of meals or supplements TID to show improvement in PO intake trending vs nutrition-related POC per MD/Palliative care teams.  Evaluation: Not met    Previous Nutrition Diagnosis  Malnutrition related to decreased oral intakes and wound healing needs, possible dysphagia/weakness component as evidenced by meeting <50% of nutrition needs for ~1 month per LTC RD, previous need for FT, concern for wt loss masked by unclear trends and fat/muscle loss.  Evaluation: No change, changed diagnosis below d/t change in condition    CURRENT NUTRITION DIAGNOSIS  Inadequate oral intake related to respiratory needs with resulting need for NPO status as evidenced by need for nutrition support pending clinical stabilization      INTERVENTIONS  Implementation  Enteral Nutrition - pending clinical status and goals of care, will order EN when appropriate- Vital High  Protein @ goal of  50ml/hr  (1200ml/day)  will provide: 1200 kcals, 104 g PRO, 1003 ml free H20, 133 g CHO, and 0 g fiber daily.  + 1 pkt Prosource TF= 80 kcal/20 g protein    Total energy/protein provisions, all sources= 1280 kcal (~21 kcal/kg)/124 g protein (2.1 g/kg) per DW of 59 kg    Collaboration and Referral of Nutrition care: pt discussed this morning during IDT rounds    Goals  Nutrition support within 2-3 days if appropriate based on clinical status and goals of care as determined by patient's family and medical team    Monitoring/Evaluation  Progress toward goals will be monitored and evaluated per protocol.    Court Marc, LORRIE, LD, [facility]  Pager - 3rd floor/ICU: [phone]  Pager - All other floors: [phone]  Pager - Weekend/holiday: [phone]  Office: [phone]

## 2023-05-12 NOTE — PROGRESS NOTES
[facility] Intensive Care Progress Note    Date of Service (when I saw the patient): 05/12/2023     Assessment & Plan       Neurology:  Baseline cognitive impairment with strokes and evidence of contractures in upper extremities.Now cardiac arrest but witnessed with immediate CPR and return of spontaneous circulation in ~ 7 minutes.  Patient does open eyes and move right upper extremities spontaneously but does not follow commands. Invasive cooling not beneficial in this situation.     P: no sedation but pain medications for CPR pain.   Observe for LOC.     Cardiovascular:  Cardiac arrest probably preceded by respiratory failure , no arrhythmia with CPR.  My bedside echo with good contractility and no pericardial effusion.  Sinus tach on monitor   Has already gotten 2 L IVF after CPR.   P: norepinephrine  PE CT scan when stabilized  Serial lactates   -    Pulmonary:        Intubated for cardiac arrest.  Hypercapnia on initial ABG but also metabolic acidosis. No airflow obstruction and PIP ok.    P: Ventilator to 22 x 370 PEEP 5   ABG  Arterial line   Central line   -    Vent Mode: CMV/AC  (Continuous Mandatory Ventilation/ Assist Control)  FiO2 (%): 100 %  Resp Rate (Set): 24 breaths/min  Tidal Volume (Set, mL): 370 mL  PEEP (cm H2O): 5 cmH2O  Resp: 24      Renal:  Metabolic acidosis.  Cr 1.1 but patient has very low muscle mass  P: repeat lactate  Cornejo   -    ID:         Infected decubitus wounds and osteomyeleitis.  Surgery recommending debridement.   Currently on Zosyn   -    GI  -    Nutrition:  Chronic malnutrition     Endocrine:  Hyperglycemia with DM after CPR  P: insulin to glucoses < 180   -    Heme/Onc:  Anemia.   -    DVT Prophylaxis: Enoxaparin (Lovenox) SQ  GI Prophylaxis: PPI    Restraints: Restraints for medical healing needed: NO    Family update by me today: No  Spoke with palliative care RN who relayed history of family dynamics, primary decision  maker daughter not answering phone or making illogical decisions.  Security has been called for behavior issues for other family members when visiting.  I will proceed under my best medical judgment and keep other daughter Court informed.     Terry Dixon MD    Time Spent on this Encounter   Billing:  I spent 35  minutes bedside and on the inpatient unit today managing the critical care of Cristal Preciado in relation to the issues listed in this note independent of procedures     Main Plans for Today   Mechanical ventilation  CT scan of chest and head  Place lines  Antibiotics  IVF  Pressors   Serial labs     Interval History   Cristal Preciado is a 73 year old female who is chronically debilitated and bedbound.  She was hospitalized in the middle of February of this year due to sacral ulcer that became infected.  She developed sepsis due to osteomyelitis and suspected necrotizing fasciitis.  She required surgery by general surgery at [facility] (Dr Ragsdale) for concerns of necrotizing fasciitis.  She was on IV Zosyn for 4 weeks in the hospital.  She was then discharged with an additional 4 weeks of oral antibiotics on Augmentin.  She has been at a transitional care unit since discharge in the middle of April.  She was sent to emergency room on 5/9 from her TCU due to staff concern that sacral wound seems to be getting worse.   There was pus in the right hip wound and some gas on CT so gen surgery recommended urgent debridement but patients daughter declined intervention..  This morning, patient was noted to be short of breath, O2 started RRT called then less responsive then no pulse felt so code called and CPR started, epi given and intubated.  return of spontaneous circulation is ~ 7 minutes.  Transferred to ICU on norepinephrine via IO device.      Physical Exam   Temp: 96.9  F (36.1  C) Temp src: Temporal Temp  Min: 96.9  F (36.1  C)  Max: 98  F (36.7  C) BP: (!) 149/68 Pulse: 104   Resp: 24 SpO2:  100 % O2 Device: Mechanical Ventilator    There were no vitals filed for this visit.  I/O last 3 completed shifts:  In: 120 [P.O.:120]  Out: 350 [Urine:350]    Neurologic:  Occasional movement of right upper extremities.  Contractures bilateral. Pupils 3 mm bilateral, sluggish.  Diffuse muscle atrophy.  Opens eyes spontaneous but does not track or follow commands.   Cardiovascular: *RR w/o m   Respiratory:  Intubated  ETT ok  Lungs clear no crepitus   GI:  Soft non distended.   Skin/Extremities:  right hip wound dressed.  Poor perfusion of feet with signs of chronic ischemia, poor cap refill.  Hands are warmer.  Left arm midline.    Lines: No erythema or discharge at entry site for Arterial Line and Central Venous Catheter  Current lines are required for patient management    Medications     norepinephrine 0.3 mcg/kg/min (05/12/23 0702)     propofol       vasopressin         aspirin  81 mg Oral or NG Tube Daily     carboxymethylcellulose PF  1 drop Both Eyes TID     dorzolamide-timolol  1 drop Both Eyes BID     DULoxetine  90 mg Oral or NG Tube QAM     enoxaparin ANTICOAGULANT  40 mg Subcutaneous Q24H     insulin aspart  1-7 Units Subcutaneous TID AC     insulin aspart  1-5 Units Subcutaneous At Bedtime     latanoprost  1 drop Both Eyes At Bedtime     mirtazapine  15 mg Oral or NG Tube At Bedtime     multivitamin w/minerals  1 tablet Oral or Feeding Tube Daily     oxyCODONE  2.5 mg Oral Q8H     pantoprazole  40 mg Per Feeding Tube QAM AC    Or     pantoprazole  40 mg Intravenous QAM AC     pantoprazole  40 mg Oral QAM AC     piperacillin-tazobactam  3.375 g Intravenous Q6H     propofol         sodium chloride (PF)  10 mL Intracatheter Q8H     sodium chloride (PF)  10 mL Intracatheter Q8H       Data   Recent Labs   Lab 05/12/23  0453 05/12/23  0414 05/12/23  0155 05/11/23  0841 05/11/23  0803 05/10/23  0924 05/10/23  0655 05/09/23  1940 05/09/23  1055   WBC  --  7.4  --   --  6.6  --   --   --  10.3   HGB  --  9.0*   --   --  9.7*  --   --   --  9.0*   MCV  --  93  --   --  86  --   --   --  87   PLT  --  417  --   --  348  --   --   --  419   INR  --  1.49*  --   --   --   --   --   --   --    NA  --  137  --   --  137  --  136  --  138   POTASSIUM  --  4.8  --   --  4.3  --  4.3  --  4.5   CHLORIDE  --  104  --   --  108*  --  106  --  110*   CO2  --  15*  --   --  16*  --  14*  --  13*   BUN  --  35.0*  --   --  38.7*  --  45.2*  --  42.6*   CR  --  1.17*  --   --  1.08*  --  1.10*  --  1.05*   ANIONGAP  --  18*  --   --  13  --  16*  --  15   OLAF  --  9.0  --   --  8.8  --  9.3  --  9.0   * 284* 144*   < > 170*   < > 201*   < > 135*   ALBUMIN  --   --   --   --  2.0*  --   --   --  2.2*   PROTTOTAL  --   --   --   --  6.4  --   --   --  6.7   BILITOTAL  --   --   --   --  0.3  --   --   --  0.3   ALKPHOS  --   --   --   --  764*  --   --   --  677*   ALT  --   --   --   --  93*  --   --   --  77*   AST  --   --   --   --  202*  --   --   --  164*    < > = values in this interval not displayed.     Recent Results (from the past 24 hour(s))   CT Pelvis Soft Tissue w Contrast   Result Value    Radiologist flags Minimal soft tissue gas anterior to the right (AA)    Narrative    CT PELVIS SOFT TISSUE W CONTRAST 5/11/2023 3:42 PM    CLINICAL HISTORY: Worsening sacral ulcer.    TECHNIQUE: CT scan of the pelvis was performed following injection of  IV contrast. Multiplanar reformats were obtained. Dose reduction  techniques were used.  CONTRAST: 65mL Isovue-370    COMPARISON: CT exams 2/16/2023 and 1/3/2023    FINDINGS:     PELVIC ORGANS: The urinary bladder is partially decompressed by a  Cornejo catheter. Mild colonic stool and moderate stool burden within  the distended rectum. Hysterectomy.    MUSCULOSKELETAL: Newly visualized upper right gluteal/lower right  sacral soft tissue defect measuring 8.3 x 1.2 x 7.5 cm. This is  consistent with interval debridement of the patient's sacral decubitus  ulcer. Note that this wound  abuts the adjacent inferior coccyx. There  is also a newly visualized 8.6 x 3.4 x 9.2 cm soft tissue defect  adjacent to the right hip. This defect abuts the right proximal femur.  There is minimal soft tissue gas anterior to the right proximal femur  for example image 38 series 2 and image numbers 21-22 of series 5.  Mild surrounding subcutaneous edema. No adjacent periosteal reaction  or osteolysis. No fluid collection.       Impression    IMPRESSION:   1.  Status post debridement of the previously seen sacral decubitus  and right hip ulcers. Associated postoperative soft tissue defects as  described above. No associated abscess/drainable fluid collection.  2.  Minimal soft tissue gas anterior to the right proximal femur. This  could reflect a localized mild gas forming infection.  3.  No convincing CT evidence for osteomyelitis.     [Critical Result: Minimal soft tissue gas anterior to the right  proximal femur.]    Finding was identified on 5/11/2023 4:24 PM.     The [facility] on call Hospitalist  was contacted by me on 5/11/2023 5:36  PM and verbalized understanding of the critical result.     AGUSTIN PORTILLO MD         SYSTEM ID:  [id number]   XR Chest Port 1 View    Narrative    EXAM: XR CHEST PORT 1 VIEW  LOCATION: [facility]  DATE/TIME: 5/12/2023 5:39 AM CDT    INDICATION: Endotracheal tube positioning  COMPARISON: CT 01/03/2023      Impression    IMPRESSION: Endotracheal tube 1.2 cm above olga and could be retracted slightly. Stable cardiomediastinal silhouette. Retrocardiac atelectasis or infiltrate. No significant effusion. Degenerative change osseous structures.

## 2023-05-12 NOTE — PROGRESS NOTES
Code blue called. Pt intubated with 7.0 ETT @ 24 gums. Transported Pt to ICU.     Sully Steele, RT

## 2023-05-12 NOTE — PLAN OF CARE
ICU End of Shift Summary.  For vital signs and complete assessments, please see documentation flowsheets.      Pertinent assessments: Eyes open most of shift, looks to side where you speak to her from. Moves arms minimally. Afebrile. Tele SR/ST, HR <100 late in shift. MAPs 55-60 with increasing dose of Levo and Vasopressin. LS coarse, Exp wheeze. Unable to capture reliable SpO2 reading, trending ABGs and lactic. Minimal ETT or oral secretions. Total UOP 155mL for shift, no measurable output since 1400. No BM. Dressing intact to sacral and R hip wound.  Major Shift Events: Vasopressor needs slightly increased. Anuric. Lactic remains elevated with only slight decrease late in day. Bicarb remains low. BGs dropped to <60. D50 given and D10 infusion started.   Plan (Upcoming Events): Titrate pressors for MAP >65. Monitor BGs. ABGs and Lactic q4 hrs.  Discharge/Transfer Needs: TBD     Bedside Shift Report Completed : Y  Bedside Safety Check Completed:Y    Goal Outcome Evaluation: Not progressing

## 2023-05-12 NOTE — PROGRESS NOTES
"[facility]   General Surgery Progress Note           Assessment and Plan:   Assessment:   - Critically ill  - Infected stage 4 sacral decubitus and right hip pressure ulcers, possible necrotizing soft tissue infection of right hip ulcer  - Conflicting information from family members on whether patient would agree to more surgery. [facility] has not agreed to surgery.       Plan:   Critical cares per ICU  Continue antibiotics and wound cares  Family needs to have discussion on goals of care.     Sejal Chappell MD     Addendum: Was able to talk with patient's children Court, Jonas, and Camron in the patient's room. Discussed status of wounds and the extent of surgery that would be required to attempt to control infection. Discussed that in her current state of instability, she may not survive surgery and that she would likely to have very poor wound healing post-operatively as demonstrated over the past 3 months. Her daughter Court expressed that if we don't expect these wounds to ever heal, then we should focus on keeping her mom comfortable. Discussions around goals of care are ongoing with palliative care, but I will not plan on performing any debridement.     Sejal Chappell MD        Interval History:   Was found to be unresponsive with hypotension and thready pulse at 3:45 AM. CPR initiated and the patient was intubated and transferred to the ICU. On pressors         Physical Exam:   Blood pressure (!) 124/108, pulse (!) 129, temperature (!) 95.9  F (35.5  C), temperature source Temporal, resp. rate 21, height 1.702 m (5' 7\"), SpO2 (!) 72 %, not currently breastfeeding.    I/O last 3 completed shifts:  In: 120 [P.O.:120]  Out: 350 [Urine:350]    Abdomen:   General appearance: Intubated and sedated   Lungs: on ventilator  Skin:   Sacral wound slough and malodorous drainage. 11 x 6 x 2 with undermining of 4 cm to the left of the wound. Minimal slough and tissue overlying sacrum with exposed bone. "      Right hip wound with beka purulence with palpation of the anterior/medial aspect of the wound.  With a sterile swab, I was able to probe medially through the slough for ~ 3 cm and express purulence in this location.  12 cm x 10 cm  x  0.5 cm with ~ 3 cm of tunneling/undermining medially. There is desiccated slough at the inferior aspect of the wound as well.             Data:     Recent Labs   Lab 05/12/23  0414 05/11/23  0803 05/09/23  1055   WBC 7.4 6.6 10.3   HGB 9.0* 9.7* 9.0*   HCT 30.6* 30.7* 28.6*   MCV 93 86 87    348 419     Recent Labs   Lab 05/12/23  0812 05/12/23  0453 05/12/23  0414 05/11/23  0841 05/11/23  0803 05/10/23  0924 05/10/23  0655 05/09/23  1940 05/09/23  1055 05/08/23  2237 05/08/23  1553 05/08/23  0740   NA  --   --  137  --  137  --  136  --  138  --  134*  --    POTASSIUM  --   --  4.8  --  4.3  --  4.3  --  4.5  --  4.8  --    CHLORIDE  --   --  104  --  108*  --  106  --  110*  --  106  --    CO2  --   --  15*  --  16*  --  14*  --  13*  --  14*  --    ANIONGAP  --   --  18*  --  13  --  16*  --  15  --  14  --    * 198* 284*   < > 170*   < > 201*   < > 135*   < > 174*  --    BUN  --   --  35.0*  --  38.7*  --  45.2*  --  42.6*  --  44.4*  --    CR  --   --  1.17*  --  1.08*  --  1.10*  --  1.05*  --  1.23*  --    GFRESTIMATED  --   --  50*  --  55*  --  53*  --  57*  --  47*  --    OLAF  --   --  9.0  --  8.8  --  9.3  --  9.0  --  8.9  --    MAG  --   --  2.3  --   --   --   --   --   --   --   --  2.0   PHOS  --   --  5.3*  --   --   --   --   --   --   --   --  3.3   PROTTOTAL  --   --   --   --  6.4  --   --   --  6.7  --  7.0  --    ALBUMIN  --   --   --   --  2.0*  --   --   --  2.2*  --  2.3*  --    BILITOTAL  --   --   --   --  0.3  --   --   --  0.3  --  0.3  --    ALKPHOS  --   --   --   --  764*  --   --   --  677*  --  773*  --    AST  --   --   --   --  202*  --   --   --  164*  --  265*  --    ALT  --   --   --   --  93*  --   --   --  77*  --  94*  --      < > = values in this interval not displayed.       Sejal Chappell MD

## 2023-05-12 NOTE — PROGRESS NOTES
Nurse Manager-writer received a call from Valerie Preciado on 5/10/23 in late afternoon stating that she needed an update from providers. Writer knew from earlier conversations on 5/10/22 that Libertad NP with Palliative Care called Valerie and had an extensive conversation regarding the patients medical status, and there is a family conference scheduled at 1300 on 5/12/23 with providers. Valerie told Libertad that she would be available by phone for the conference. Writer witnessed the phone call being placed and voicemail left for Valerie with a call back number at the beginning of the conference. Valerie did not end of calling into meeting. During the writer's phone conversation with Valerie on 5/10/23, the writer explained to Valerie that Libertad called and spoke with her that morning and called and left a voicemail for her during the conference. Valerie denied speaking with Libertad over the phone or receiving a voicemail. Writer confirmed the phone number we should be using as there are 2 phone numbers listed in Epic for Valerie. Valerie stated that we should use the mobile number listed as she does not have the phone for the other number listed. Writer confirmed on 5/12/23 that Libertad was calling Valerie on the mobile phone number listed in the chart. Ranjan Sanches RN on 5/12/2023 at 1:26 PM

## 2023-05-12 NOTE — PROGRESS NOTES
CODE BLUE note    Cristal Preciado 73-year-old lady with acute worsening of chronic sacral pressure wounds, chronic right hip wound, chronic bilateral heel ulcers, T2DM, HTN, hypercholesteremia, CAD with previous stroke, CKD stage III, chronic anemia with severe protein calorie malnutrition for home a rapid response was called and around 3:55 AM CPR was started for cardiac arrest.  Patient received 2 epinephrines and ROSC was established at 4:02 AM.  Patient did have pulse and her blood pressure was around 120 systolic.  She was intubated and will be transferred to ICU.   BMP, VBG, magnesium and a CBC are drawn  Unable to talk to the daughter Valerie on her cell phone [phone] at 4:19 AM and left a message to call [phone].  Did talk to the ICU doctor on-call

## 2023-05-12 NOTE — PROGRESS NOTES
I made another attempt (4 total today) to get a hold of Valerie at [phone]. VM was now left for her to call the ICU at [phone].     Electronically signed by  Libertad Romano CNP

## 2023-05-12 NOTE — PROGRESS NOTES
[facility] ICU VENTILATOR RESPIRATORY NOTE    Date of Admission: 5/8/23  Date of Intubation (most recent): 5/12/23  Reason for Mechanical Ventilation: Cardiac arrest  Number of Days on Mechanical Ventilation: 1    Met Criteria for Pressure Support Trial: No  Reason for No Pressure Support Trial: post arrest     Vital Signs:  Temp: 97.2  F (36.2  C) Temp src: Axillary BP: (!) 124/108 Pulse: 103   Resp: (!) 34 SpO2: (!) 31 % O2 Device: Mechanical Ventilator         ETT appearance on chest x-ray: Endotracheal tube 1.2 cm above olga     Vent Mode: CMV/AC  (Continuous Mandatory Ventilation/ Assist Control)  FiO2 (%): 60 %  Resp Rate (Set): 25 breaths/min  Tidal Volume (Set, mL): 450 mL  PEEP (cm H2O): 5 cmH2O  Resp: (!) 34    Recent Labs   Lab 05/12/23  1103 05/12/23  0737 05/12/23  0414 05/08/23  1531   PH 7.11* 7.10*  --  7.34   PCO2 27* 35  --   --    PO2 129* 249*  --   --    HCO3 8* 11*  --   --    O2PER 60 100 100  --          Plan:  Continue to monitor and wean as able    Marisa Gerber, RT

## 2023-05-12 NOTE — CONSULTS
"General Surgery Consultation    Assessment:    Cristal Preciado is a 73 year old female seen in consultation for an infected stage IV pressure ulcer of the right hip as well as a chronic stage IV pressure ulcer of the sacrum.  The patient is ill-appearing and minimally interactive.  Her right hip wound has approximately 3 cm of undermining at the anterior edge of the wound and when this area is palpated, there is purulent drainage.  Her CT suggests that there may be subcutaneous gas in the soft tissue anterior to the right proximal femur.  With the undermining of the wound, it is possible that this area is simply tunneling and that the air is atmospheric rather than a gas-forming organism.  However with the presence of significant purulence I do recommend surgical debridement of the wound either way.      Since the patient is minimally verbal and is unable to make her own medical decisions at this time, I called her daughter and healthcare agent Valerie.  It took several tries on both her home and mobile phone to reach her, but ultimately I had a long phone call with her regarding her mother's condition and my concern for a potentially life-threatening infection of her chronic right hip wound.  Valerie's impression of her mother's current state of health is very at odds with reality.  She states that she \"just saw her mother a few days ago and she was just fine and her wounds were healing.\"  She does not believe me that her mother has an infection.  I read the CT report to her regarding the possibility of a gas-forming infection within the soft tissue.  She still does not believe that her mother had an infection and forbade me from \" touching her mother.\"  I tried to explain that these types of infections can come on rapidly, so even if she was seeming okay a few days ago that she truly does have an infection now that needs surgical attention.  I asked her if she was able to come to the hospital to speak in person and " "potentially show her the wounds if she was interested in seeing them.  She said she was unable to come to the hospital and asked me for my credentials.  I told her that I was a general surgeon who was called urgently to assess her mother. I informed her that I treat necrotizing soft tissue infections and perform sharp wound debridements on wounds with infected or necrotic tissue.  She asked to speak with a \"wound specialist.\"  I informed her that, while we do have wound ostomy nurses here, they are not here after hours and are currently unavailable.  I informed her that our wound nurses do not perform surgical debridements and that these operations are performed by surgeons such as myself.    She reiterated several times that I am not allowed to touch her mother and if \"my mother is not up and at 'em the next time I see her, there will be hell to pay.\" Notably, the patient is and has been bedridden and that she hasn't walked in years according to our records.  I reiterated to Valerie that she was brought to the hospital for worsening wound infection and that I am concerned she will continue to deteriorate without surgical care.  Her bedside RN witnessed the conversation and I asked her bedside nurse to confirm with Valerie over the phone that she was refusing surgery, which she did.     Since my conversation with Valerie was so unusual, I called Cristal's other daughter Court, who was present at a care conference yesterday to inform her of her mother's CT findings and that surgical debridement of her right hip wound would be needed to treat the infection.  Court informed me that Valerie made herself the sole healthcare agent in March without the family's consent.  She informed me that Valerie has been relatively estranged from the family for the last 10 years and that the family has concerns for Valerie's mental health.  Court is personally in favor of continuing aggressive cares for her mother and would be " agreeable to surgery.  However, she is not a healthcare agent who can sign consent.  They are in the legal process of trying to change Cristal's healthcare agent from Valerie, but Court says the paperwork likely will not go through for another week or so.  Court informed me that she was going to talk to other family members and try to encourage Valerie to agree to the surgery.     Court and I discussed that even with surgical debridement, that her mother is still very chronically ill and may never heal from these wounds.  Court brought up the possibility of plastic surgery to close the wounds and I informed her that plastic surgery would not be an option with active infection and that it would likely be several weeks to months before she would potentially be ready before a flap closure, though in her current state of health, independence, and nutrition, I don't think she will ever actually be healthy enough to achieve flap closure.     While I do not believe this wound infection will resolve without surgery, I also consider additional aggressive surgery to be potentially medically futile in this patient who has chronic nonhealing, worsening wounds who is no longer able to maintain her nutrition.  It is possible that surgical debridement of her right hip wound would result in an open wound up to twice as large as it currently is and she is already suffering with pain from wound cares.  Further discussions with palliative care are warranted regarding the patient's poor prognosis and declining quality of life.     Plan:  For now, I am awaiting return phone calls from the family regarding how to proceed in a way that would align with the patient's wishes.  She is not imminently dying from this infection tonight and has normal vital signs an no leukocytosis, so waiting for the family to discuss is reasonable.  In the meantime, continue broad-spectrum antibiotics, dressing changes per wound care  recommendations, continue palliative care discussions.      Sejal Chappell MD    HPI:  Cristal Preciado is a 73 year old female who has a past medical history significant for diabetes mellitus type 2, hypertension, hyperlipidemia, coronary artery disease, chronic sacral decubitus ulcer (debrided in February at [facility]), recent sepsis due to osteomyelitis requiring 8 weeks of antibiotics, previous stroke, GERD.  She is chronically debilitated and bedridden.  She was hospitalized in the middle of February of this year due to sacral ulcer that became infected.  She developed sepsis due to osteomyelitis and suspected necrotizing fasciitis.  She required surgical debridement for concerns of necrotizing fasciitis.  She was on IV Zosyn for 4 weeks in the hospital.  She was then discharged with an additional 4 weeks of oral antibiotics on Augmentin.  She has been at a transitional care unit since discharge in the middle of April.  She was sent to emergency room 5/8/23 from her TCU due to staff concern that sacral wound seems to be getting worse.  The patient has chronic sacral and hip pain. No fever/chills prior to admission, though I do see that she did have a fever of 101.1 in the hospital 2 days ago.      A CT scan was performed this afternoon to assess her wounds and she was noted to have extensive soft tissue edema and possibly small foci of gas within the soft tissues anterior to the femur near her right hip wound.  Given this finding, I was urgently consulted to assess for potential necrotizing fasciitis.      PMH:  Past Medical History:   Diagnosis Date     AION (anterior ischaemic optic neuropathy), left eye     NAION LE     CAD (coronary artery disease) 3/2009    [facility]; Angio 2013 [facility]- normal coronary arteries     Cataract      CVA (cerebral vascular accident) (H)     admitted at [facility]     on Cymbalta     Diabetes mellitus, type 2 (H)      Diabetic nephropathy (H)      Diabetic  neuropathy (H)     severe     Diabetic retinopathy (H)      GERD (gastroesophageal reflux disease)      Hyperlipidemia      Hypertension     ECHO 2013, TDS, NL EF     Infection due to  novel coronavirus 2023     POAG (primary open-angle glaucoma)     adv BE     Seasonal allergies      Tubular adenoma of colon     repeat colonoscopy in        PSH:  Past Surgical History:   Procedure Laterality Date     CARDIAC CATHETERIZATION       CATARACT EXTRACTION W/ INTRAOCULAR LENS IMPLANT Bilateral       SECTION        SECTION       COLONOSCOPY  7/15/2013    Tubular adenoma; repeat in 2018;Procedure: COMBINED COLONOSCOPY, SINGLE BIOPSY/POLYPECTOMY BY BIOPSY;;  Surgeon: Don King MD;  Tubular adenoma     COLONOSCOPY N/A 2020    Procedure: COLONOSCOPY;  Surgeon: Sid Amanda MD;  Location: [facility] GI     CORONARY STENT PLACEMENT  2004    RCA     DAVINCI HYSTERECTOMY TOTAL, BILATERAL SALPINGO-OOPHORECTOMY, COMBINED N/A 2019    Procedure: DaVinci Assisted Total Laparoscopic Hysterectomy, Removal Of Both Tubes And Ovaries;  Surgeon: Linh Cardoso MD;  Location: [facility] OR     EXTRACAPSULAR CATARACT EXTRATION WITH INTRAOCULAR LENS IMPLANT  11-10-09, 2-9-10    11-10-09 Lt, 2-9-10 Rt; Left eye 2012     HYSTERECTOMY TOTAL ABDOMINAL, BILATERAL SALPINGO-OOPHORECTOMY, COMBINED  2019    Procedure: DaVinci Assisted Total Laparoscopic Hysterectomy, Removal Of Both Tubes And Ovaries; Surgeon: Linh Cardoso MD; Location: [facility] OR       IRRIGATION AND DEBRIDEMENT TRUNK, COMBINED Right 2023    Procedure: DEBRIDEMENT OF SACRAL ULCER,  DEBRIDEMENT OF RIGHT HIP ULCER;  Surgeon: Shawna Ragsdale MD;  Location: [facility] OR     PICC TRIPLE LUMEN PLACEMENT  2023          STENT, CORONARY, DELORES      RCA       Allergies:  Allergies   Allergen Reactions     Dust Mites Other (See Comments)     Sneezing runny eyes and nose.     Other Environmental Allergy Hives      "Mountain Dew     Pollen Extract Other (See Comments)     Sneezing runny eyes and nose.     Walnuts [Nuts] Hives       Home Medications:  No current outpatient medications on file.       Social History:  Social History     Tobacco Use     Smoking status: Former     Packs/day: 1.50     Years: 45.00     Pack years: 67.50     Types: Cigarettes     Start date: 1/1/1968     Quit date: 3/6/2013     Years since quitting: 10.1     Smokeless tobacco: Never   Vaping Use     Vaping status: Never Used   Substance Use Topics     Alcohol use: Not Currently     Drug use: Never       Family History:  Family history reviewed and is not pertinent.    ROS:  The 10 point review of systems is negative other than noted in the HPI and above.    PE:  /52 (BP Location: Right arm, Patient Position: Semi-Blas's, Cuff Size: Adult Regular)   Pulse 88   Temp 98  F (36.7  C) (Temporal)   Resp 16   Ht 1.702 m (5' 7\")   SpO2 100%   BMI 20.36 kg/m    General appearance: Chronically ill, awakens only to very loud voice.  Will answer yes or no questions but does not seem oriented to place/situation.   Lungs: respirations unlabored  Skin:   Sacral wound slough and malodorous drainage. 11 x 6 x 2 with undermining of 4 cm to the left of the wound. Minimal slough and tissue overlying sacrum with exposed bone.     Right hip wound with beka purulence with palpation of the anterior/medial aspect of the wound.  With a sterile swab, I was able to probe medially through the slough for ~ 3 cm and express purulence in this location.  12 cm x 10 cm  x  0.5 cm with ~ 3 cm of tunneling/undermining medially. There is desiccated slough at the inferior aspect of the wound as well.     Psych- Minimally interactive, groans with wound cares/assessment    Imaging:  All imaging studies reviewed by me.    Recent Results (from the past 744 hour(s))   US Abdomen Limited    Narrative    EXAM: US ABDOMEN LIMITED  LOCATION: [facility]  DATE/TIME: 5/8/2023 6:52 PM CDT    INDICATION: RUQ pain  COMPARISON: CT 01/03/2023  TECHNIQUE: Limited abdominal ultrasound.    FINDINGS:    GALLBLADDER: The gallbladder is distended with a thin wall. Gallbladder sludge. No shadowing stone. The sonographic Mayo sign is negative.    BILE DUCTS: No biliary dilatation. The common duct measures 4 mm.    LIVER: Normal parenchyma with smooth contour. No focal mass.    RIGHT KIDNEY: No hydronephrosis.    PANCREAS: The pancreas is largely obscured by overlying gas.    No ascites.      Impression    IMPRESSION:  1.  Gallbladder sludge without evidence of acute cholecystitis.       CT Pelvis Soft Tissue w Contrast   Result Value    Radiologist flags Minimal soft tissue gas anterior to the right (AA)    Narrative    CT PELVIS SOFT TISSUE W CONTRAST 5/11/2023 3:42 PM    CLINICAL HISTORY: Worsening sacral ulcer.    TECHNIQUE: CT scan of the pelvis was performed following injection of  IV contrast. Multiplanar reformats were obtained. Dose reduction  techniques were used.  CONTRAST: 65mL Isovue-370    COMPARISON: CT exams 2/16/2023 and 1/3/2023    FINDINGS:     PELVIC ORGANS: The urinary bladder is partially decompressed by a  Cornejo catheter. Mild colonic stool and moderate stool burden within  the distended rectum. Hysterectomy.    MUSCULOSKELETAL: Newly visualized upper right gluteal/lower right  sacral soft tissue defect measuring 8.3 x 1.2 x 7.5 cm. This is  consistent with interval debridement of the patient's sacral decubitus  ulcer. Note that this wound abuts the adjacent inferior coccyx. There  is also a newly visualized 8.6 x 3.4 x 9.2 cm soft tissue defect  adjacent to the right hip. This defect abuts the right proximal femur.  There is minimal soft tissue gas anterior to the right proximal femur  for example image 38 series 2 and image numbers 21-22 of series 5.  Mild surrounding subcutaneous edema. No adjacent periosteal reaction  or osteolysis. No fluid  collection.       Impression    IMPRESSION:   1.  Status post debridement of the previously seen sacral decubitus  and right hip ulcers. Associated postoperative soft tissue defects as  described above. No associated abscess/drainable fluid collection.  2.  Minimal soft tissue gas anterior to the right proximal femur. This  could reflect a localized mild gas forming infection.  3.  No convincing CT evidence for osteomyelitis.     [Critical Result: Minimal soft tissue gas anterior to the right  proximal femur.]    Finding was identified on 5/11/2023 4:24 PM.     [facility] [employer] on call Hospitalist  was contacted by me on 5/11/2023 5:36  PM and verbalized understanding of the critical result.     AGUSTIN PORTILLO MD         SYSTEM ID:  [id number]       This note was created using voice recognition software. Undetected word substitutions or other errors may have occurred.     Time spent with the patient, reviewing the EMR, reviewing laboratory and imaging studies, more than 50% of which was counseling and coordinating care:  75 minutes..     Sejal Chappell MD

## 2023-05-12 NOTE — PROCEDURES
[facility]    Central line    Date/Time: 5/12/2023 7:03 AM    Performed by: Terry Dixon MD  Authorized by: Terry Dixon MD  Indications: vascular access      UNIVERSAL PROTOCOL   Site Marked: NA  Prior Images Obtained and Reviewed:  NA  Required items: Required blood products, implants, devices and special equipment available    Patient identity confirmed:  Anonymous protocol, patient vented/unresponsive  NA - No sedation, light sedation, or local anesthesia  Confirmation Checklist:  Correct equipment/implants were available and procedure was appropriate and matched the consent or emergent situation  Time out: Immediately prior to the procedure a time out was called    Universal Protocol: the Joint Commission Universal Protocol was followed    Preparation: Patient was prepped and draped in usual sterile fashion    ESBL (mL):  0    SEDATION    Patient Sedated: No      Preparation: skin prepped with ChloraPrep  Skin prep agent dried: skin prep agent completely dried prior to procedure  Sterile barriers: all five maximum sterile barriers used - cap, mask, sterile gown, sterile gloves, and large sterile sheet  Hand hygiene: hand hygiene performed prior to central venous catheter insertion  Patient position: flat  Catheter type: triple lumen  Pre-procedure: landmarks identified  Ultrasound guidance: yes  Sterile ultrasound techniques: sterile gel and sterile probe covers were used  Number of attempts: 1  Successful placement: yes  Post-procedure: line sutured and dressing applied  Assessment: blood return through all ports and free fluid flow      PROCEDURE  Describe Procedure: Dx:  Cardiac arrest   Emergency consent after CPR   Patient Tolerance:  Patient tolerated the procedure well with no immediate complications  Length of time physician/provider present for 1:1 monitoring during sedation: 0

## 2023-05-12 NOTE — PROVIDER NOTIFICATION
DATE:  5/12/2023   TIME OF RECEIPT FROM LAB:  0508  LAB TEST:  troponin  LAB VALUE:  241  RESULTS GIVEN WITH READ-BACK TO (PROVIDER):  Called to [facility] in tele hub    TIME LAB VALUE REPORTED TO PROVIDER:   0510

## 2023-05-12 NOTE — PROGRESS NOTES
Remains on max doses of 2 pressors, slight decline in lactate but now getting hypoglycemic and hyperkalemic and bicarb still only 9. .  Only 155 urinary output since MN.   Will add D10 at 30.  Not a dialysis candidate.  Will check LFTs for shock liver.  Already significant hyperventilation for metabolic acidosis, probably can't do much more to reduce acidosis.      Will start steroids for septic shock.  Repeat ABG wiith PCO2 20 so can't ventilate any more.

## 2023-05-12 NOTE — PROGRESS NOTES
Notified provider about indwelling chase catheter discussed removal or continued need.    Did provider choose to remove indwelling chase catheter? NO    Provider's chase indication for keeping indwelling chase catheter: Indication for continued use: Wound Healing    Is there an order for indwelling chase catheter? YES    *If there is a plan to keep chase catheter in place at discharge daily notification with provider is not necessary, but please add a notation in the treatment team sticky note that the patient will be discharging with the catheter.

## 2023-05-12 NOTE — PROGRESS NOTES
Phone call placed to patient's daughter Court to update her on the patient condition change over night. While explaining about starting CPR, she cut me off and said she is on her way and will get an update at that time.    Electronically signed by  Libertad Romano CNP

## 2023-05-12 NOTE — PROGRESS NOTES
D/w radiologist course of right central line.  I hooked up the line to the monitor and confirmed a CVP waveform.    P: can use line as central line.     I  Also increased RR and TV on ventilator for respiratory and metabolic acidosis.  Also d/w Gen Surgery patients decubitus wound. No plans for debridement at this point as patient is quite unstable.      -------------------------------------------------------  12:01 PM  Updated Note  Increasing lactate and persistent acidosis despite increase in RR and TV.  Oliguria.  /60 on norepinephrine 0.32 and vasopressin so not sure why the lactate is worsening.  Patient certainly is at risk for bowel ischemia with her advanced arteriosclerosis.  Only treatment is supportive at this point.

## 2023-05-12 NOTE — PROGRESS NOTES
[facility]  Hospitalist Progress Note  Preston Rodrigez M.D., M.B.A.   05/12/2023    Reason for Stay/active problem list      S/p cardiac arrest     Septic shock     Infected decubitus wounds and osteomyeleitis.          Assessment and Plan:        Summary of Stay: Cristal Preciado is a 73 year old female admitted on 5/8/2023. She has a past medical history significant for diabetes mellitus type 2, hypertension, hyperlipidemia, coronary artery disease, chronic sacral decubitus ulcer, recent sepsis due to osteomyelitis requiring 8 weeks of antibiotics, previous stroke, GERD.  She was sent to emergency room due to concern for possible worsening of her chronic sacral wound.    Patient declined on the floor with code blue called on morning of May 12.Patient was then transferred to ICU intubated on vent .      Problem List with Assessment and Plan:    S/p cardiac arrest , septic shock and Infected decubitus wounds and osteomyeleitis.  Acute worsening of chronic sacral pressure wound.  Chronic right hip wound.  Chronic bilateral heel ulcers.    -Hospitalized middle of February for sepsis due to sacral osteomyelitis and possible necrotizing fasciitis.  -Completed 8 weeks of antibiotics in the middle of April.  -Has been at TCU.  -Staff at TCU concerned that sacral wound might be getting worse and she was admitted  -CT abdomen and pelvis  On May 11 showed a minimal amount of soft tissue gas anterior to the proximal femur raising question of gas-forming infection.  -General surgery was consulted and patient was seen by Dr Chappell   -she subsequently coded and resuscitated and admitted to ICU for [facility] care   -complex family dynamics noted   -patient was treated with antibiotics -AMY garcia consulted  -currently she is on vent, two pressors , no sedation required concerning for anoxic brain injury. Intensivist following for vent and critical illness      Diabetes mellitus type 2.  -Had recently been taking off  "scheduled insulin due to episodes of hypoglycemia.  -Metformin stopped.  -Continue aspart insulin sliding scale as needed.     Hypertension.  Hyperlipidemia.  Coronary artery disease.  Previous stroke.  - hypotensive and vent now , will continue to hold bb and statin     ALY on Chronic kidney disease stage IIIa.  Severe metabolic acidosis  -Avoid nephrotoxins as able.  -Creatinine stable again today at 1.37, bicarb 9 , k 5.4 , Intensivist following      Transaminitis.  -Abdominal ultrasound shows gallbladder sludge.  -Recheck LFTs intermittently.  -Hold atorvastatin for now.     Chronic anemia.  -Hemoglobin mildly lower than recent baseline.  -No known acute hemorrhage.  -Hemoglobin has been stable.  -Recheck CBC intermittently.     GERD.  -Continue pantoprazole IV         Severe protein/calorie malnutrition.  -Appreciate registered dietitian input.        VTE Prophylaxis: Enoxaparin (Lovenox) SQ  Code Status: Full Code  Diet: Combination Diet Pureed Diet (level 4); Thin Liquids (level 0)  Snacks/Supplements Adult: Other; Magic Cup offerings w/ lunch + High protein Gelatein w/ breakfast + 1 pkt Terrence mixed w/ apple juice w/ dinner; With Meals    Cornejo Catheter: PRESENT, indication: Wound Healing    Family updated today: Yes      Disposition: Continue ICU care.  Appears to have poor prognosis        Interval History (Subjective):        Patient is seen and examined by me today and medical record reviewed.Overnight events noted and care discussed with nursing staff.  She remained intubated on vent.  Multiple family present.  Care plan discussed with intensivist and care management team.         Physical Exam:        Last Vital Signs:  BP (!) 124/108   Pulse 103   Temp 97.2  F (36.2  C) (Axillary)   Resp (!) 34   Ht 1.702 m (5' 7\")   Wt 59.4 kg (130 lb 15.3 oz)   SpO2 (!) 31%   BMI 20.51 kg/m      I/O last 3 completed shifts:  In: 1763.67 [I.V.:713.67; IV Piggyback:1050]  Out: 355 [Urine:355]    Wt Readings from " Last 5 Encounters:   05/12/23 59.4 kg (130 lb 15.3 oz)   05/08/23 59 kg (130 lb)   05/04/23 59 kg (130 lb)   05/02/23 59 kg (130 lb)   04/24/23 59 kg (130 lb)        Constitutional:  Unresponsive     Respiratory: Clear to auscultation bilaterally, no crackles or wheezing   Cardiovascular: Regular rate and rhythm, normal S1 and S2, and no murmur noted   Abdomen: Normal bowel sounds, soft, non-distended, non-tender   Skin: No new rashes, no cyanosis, dry to touch   Neuro:  Unresponsive   Extremities: No edema   Other(s):        All other systems: Negative          Medications:        All current medications were reviewed with changes reflected in problem list.         Data:      All new lab and imaging data was reviewed.      Data reviewed today: I reviewed all new labs and imaging results over the last 24 hours. I personally reviewed     Recent Labs   Lab 05/12/23  0414 05/11/23  0803 05/09/23  1055   WBC 7.4 6.6 10.3   HGB 9.0* 9.7* 9.0*   HCT 30.6* 30.7* 28.6*   MCV 93 86 87    348 419     No results for input(s): CULT in the last 168 hours.  Recent Labs   Lab 05/12/23  1525 05/12/23  1521 05/12/23  1104 05/12/23  1101 05/12/23  0453 05/12/23  0414 05/11/23  0841 05/11/23  0803 05/09/23  1940 05/09/23  1055 05/08/23  2237 05/08/23  1553 05/08/23  0740   NA  --  141  --   --   --  137  --  137   < > 138  --  134*  --    POTASSIUM  --  5.4*  --   --   --  4.8  --  4.3   < > 4.5  --  4.8  --    CHLORIDE  --  111*  --   --   --  104  --  108*   < > 110*  --  106  --    CO2  --  9*  --   --   --  15*  --  16*   < > 13*  --  14*  --    ANIONGAP  --  21*  --   --   --  18*  --  13   < > 15  --  14  --    GLC 78 92  --  158*   < > 284*   < > 170*   < > 135*   < > 174*  --    BUN  --  34.3*  --   --   --  35.0*  --  38.7*   < > 42.6*  --  44.4*  --    CR  --  1.37* 1.25*  --   --  1.17*  --  1.08*   < > 1.05*  --  1.23*  --    GFRESTIMATED  --  41* 46*  --   --  50*  --  55*   < > 57*  --  47*  --    OLAF  --  8.1*   --   --   --  9.0  --  8.8   < > 9.0  --  8.9  --    MAG  --   --   --   --   --  2.3  --   --   --   --   --   --  2.0   PHOS  --   --   --   --   --  5.3*  --   --   --   --   --   --  3.3   PROTTOTAL  --   --   --   --   --   --   --  6.4  --  6.7  --  7.0  --    ALBUMIN  --   --   --   --   --   --   --  2.0*  --  2.2*  --  2.3*  --    BILITOTAL  --   --   --   --   --   --   --  0.3  --  0.3  --  0.3  --    ALKPHOS  --   --   --   --   --   --   --  764*  --  677*  --  773*  --    AST  --   --   --   --   --   --   --  202*  --  164*  --  265*  --    ALT  --   --   --   --   --   --   --  93*  --  77*  --  94*  --     < > = values in this interval not displayed.       Recent Labs   Lab 05/12/23  1525 05/12/23  1521 05/12/23  1101 05/12/23  0812 05/12/23  0453   GLC 78 92 158* 201* 198*       Recent Labs   Lab 05/12/23  0414   INR 1.49*         No results for input(s): TROPONIN, TROPI, TROPR in the last 168 hours.    Invalid input(s): TROP, TROPONINIES    Recent Results (from the past 48 hour(s))   CT Pelvis Soft Tissue w Contrast   Result Value    Radiologist flags Minimal soft tissue gas anterior to the right (AA)    Narrative    CT PELVIS SOFT TISSUE W CONTRAST 5/11/2023 3:42 PM    CLINICAL HISTORY: Worsening sacral ulcer.    TECHNIQUE: CT scan of the pelvis was performed following injection of  IV contrast. Multiplanar reformats were obtained. Dose reduction  techniques were used.  CONTRAST: 65mL Isovue-370    COMPARISON: CT exams 2/16/2023 and 1/3/2023    FINDINGS:     PELVIC ORGANS: The urinary bladder is partially decompressed by a  Cornejo catheter. Mild colonic stool and moderate stool burden within  the distended rectum. Hysterectomy.    MUSCULOSKELETAL: Newly visualized upper right gluteal/lower right  sacral soft tissue defect measuring 8.3 x 1.2 x 7.5 cm. This is  consistent with interval debridement of the patient's sacral decubitus  ulcer. Note that this wound abuts the adjacent inferior coccyx.  There  is also a newly visualized 8.6 x 3.4 x 9.2 cm soft tissue defect  adjacent to the right hip. This defect abuts the right proximal femur.  There is minimal soft tissue gas anterior to the right proximal femur  for example image 38 series 2 and image numbers 21-22 of series 5.  Mild surrounding subcutaneous edema. No adjacent periosteal reaction  or osteolysis. No fluid collection.       Impression    IMPRESSION:   1.  Status post debridement of the previously seen sacral decubitus  and right hip ulcers. Associated postoperative soft tissue defects as  described above. No associated abscess/drainable fluid collection.  2.  Minimal soft tissue gas anterior to the right proximal femur. This  could reflect a localized mild gas forming infection.  3.  No convincing CT evidence for osteomyelitis.     [Critical Result: Minimal soft tissue gas anterior to the right  proximal femur.]    Finding was identified on 5/11/2023 4:24 PM.     The [facility] on call Hospitalist  was contacted by me on 5/11/2023 5:36  PM and verbalized understanding of the critical result.     AGUSTIN PORTILLO MD         SYSTEM ID:  [id number]   XR Chest Port 1 View    Narrative    EXAM: XR CHEST PORT 1 VIEW  LOCATION: [facility]  DATE/TIME: 5/12/2023 5:39 AM CDT    INDICATION: Endotracheal tube positioning  COMPARISON: CT 01/03/2023      Impression    IMPRESSION: Endotracheal tube 1.2 cm above olga and could be retracted slightly. Stable cardiomediastinal silhouette. Retrocardiac atelectasis or infiltrate. No significant effusion. Degenerative change osseous structures.   XR Chest Port 1 View    Narrative    XR CHEST PORT 1 VIEW 5/12/2023 8:40 AM    HISTORY: line placement    COMPARISON: Earlier today at 5:35 AM      Impression    IMPRESSION: The right neck catheter has been placed which appears to  course medially, although evaluation is slightly limited by patient  rotation. Based on this single radiograph, an arterial  course of the  catheter is not entirely excluded. Please correlate clinically with  blood return/transducer tracing from the catheter. Consider repeating  the radiograph with better positioning. If this is a venous catheter,  then the tip is in appropriate position, at the SVC/right atrial  junction. Pacer pads over the chest. Left basilar retrocardiac  atelectasis. Small left pleural effusion. No significant right pleural  effusion. No pneumothorax.    Findings were discussed with Dr. Dixon at 9:12 AM    EARLINE ORTIZ MD         SYSTEM ID:  [id number]   XR Abdomen Port 1 View    Narrative    XR ABDOMEN PORT 1 VIEW   5/12/2023 8:40 AM     HISTORY: OG verification    COMPARISON: CT of the abdomen and pelvis on 1/3/2023      Impression    IMPRESSION: Enteric tube tip and sidehole are in the stomach.    EARLINE ORTIZ MD         SYSTEM ID:  [id number]       COVID Status:  COVID-19 PCR Results        3/30/2023    10:00 4/3/2023    10:00 4/6/2023    21:06 4/13/2023    17:00 4/17/2023    14:00   COVID-19 PCR Results   SARS CoV2 PCR Negative   Negative   Negative   Negative   Negative             4/20/2023    07:21 4/27/2023    07:00 5/1/2023    09:00 5/4/2023    14:00 5/8/2023    14:00   COVID-19 PCR Results   SARS CoV2 PCR Negative   Negative   Negative   Negative   Negative     COVID-19 Antibody Results, Testing for Immunity         No data to display                 Disclaimer: This note consists of symbols derived from keyboarding, dictation and/or voice recognition software. As a result, there may be errors in the script that have gone undetected. Please consider this when interpreting information found in this chart.

## 2023-05-12 NOTE — PROGRESS NOTES
Phone call placed to Valerie at the following number [phone].  A VM was left. Phone call also placed to [phone] and the VM box was full. No message was able to be left.     Electronically signed by  Libertad Romano CNP

## 2023-05-12 NOTE — ANESTHESIA CARE TRANSFER NOTE
Patient: Cristal Preciado    Procedure: * No procedures listed *       Diagnosis: * No pre-op diagnosis entered *  Diagnosis Additional Information: No value filed.    Anesthesia Type:   No value filed.     Note:    Oropharynx: ventilatory support and endotracheal tube in place  Level of Consciousness: unresponsive  Patient oxygen source: vital signs improving.  Level of Supplemental Oxygen (L/min / FiO2): 10  Independent Airway: airway patency not satisfactory and stable  Dentition: dentition unchanged    Report to RN Given: handoff report given  Patient transferred to: Medical/Surgical Unit      post-procedure handoff checklist not completed for medical reasons    Vitals:  Vitals Value Taken Time   /88 05/12/23 0455   Temp     Pulse 134 05/12/23 0456   Resp 29 05/12/23 0456   SpO2 79 % 05/12/23 0456   Vitals shown include unvalidated device data.    Electronically Signed By: LAMONT Randall CRNA  May 12, 2023  4:58 AM

## 2023-05-12 NOTE — PROGRESS NOTES
"PALLIATIVE CARE PROGRESS NOTE  [facility]     Patient Name: Cristal Preciado  Date of Admission: 5/8/2023   Today the patient was seen for: goals of care conversations       Recommendations & Counseling      GOALS OF CARE:     Restorative without limits      ADVANCE CARE PLANNING:    has directive, there are questions and concerns that need to be addressed in the coming days    There is a POLST form which will need update prior to discharge.    Code status: Full Code     MEDICAL MANAGEMENT:   #Pain,acute on chronic     Opioids: oxycodone (Roxicodone) IR -scheduled oxycodone 2.5 mg every 8 hours to make sure she is getting some pain medications, will reassess tomorrow for on going pain control    Acetaminophen (Tylenol), PRN    Other medicines for pain: cymbalta-restart per medical team when appropriate     #Protein calorie malnutrition in the context of chronic illness as evidenced by poor nutrient intake and muscle loss    Encourage PO intakes as able    #Dyspnea    Full vent support     #Constipation,Medication adverse effect  Last documented BM: unknown  Sennoside (Senokot)  Polyethylene glycol (MiraLAX)      PSYCHOSOCIAL/SPIRITUAL:    Family Daughters Valerie and Court, son Camron      Palliative Care will continue to follow. Thank you for allowing us to aid in the care of Cristal Preciado.    These recommendations have been discussed with medical team and family.    Libertad Romano NP  Securely message with Vocera (more info)  Text page via [employer] Paging/Directory         Assessments          Met with patient's sons Jonas and Camron and Court at the bedside. I have tried several times today to get a hold of Valerie to discussed goals.     Discussed and reviewed overall concerns for her health and her mothers current critical illness with family at the bedside. Discussed the concerns with CPR given her condition. She reports \"I don't feel like I can even say anything since Valerie is " "the health care agent\". We discussed the concerns around the directive and that we are hoping to to get the input of all the family members in order to gather information about what the patient would want for her cares. Discussed that we will continue to reach out to John to discuss the concerns for not being available and having effective communication. Discussed that we need people that are able to undertand accurate information and the medical options, prognosis and understanding. she verbalized that she does not feel that John is showing that she is available or is understanding her mom's condition. She requests that she and her brothers be the decision makers. Discussed that we are still trying to get a hold of john to discuss this with her as well. Court reports that she and her siblings and her aunts have tried to reach John and she is not returning the phone calls. She reports that she would like to take time to talk with her family about care options. Court stated that she does not feel like she can make any decisions about her code status before talking with her sister April (who lives in a care center and will need 24 hours to get here). She is also hoping to talk with patient's sisters who live out of state. She would like to see how she does and allow her sister to come tomorrow and continue to talk about cares.     It is reasonable to continue to reach out to John and get her input as able however, Court should be contacted and all decisions should be made in collaboration.               Interval History:     Patient remains on the vent, family at the bedside      Chart review/discussion with unit or clinical team members:   Medical team, nursing, family            Review of Systems:     Besides above, an additional  system ROS was reviewed and is unremarkable               Physical Exam:   Temp: (!) 95.9  F (35.5  C) Temp src: Temporal Temp  Min: 95.9  F (35.5  C)  Max: 98  F " (36.7  C) BP: (!) 124/108 Pulse: (!) 129   Resp: 21 SpO2: (!) 72 % O2 Device: Mechanical Ventilator    Vital Signs with Ranges  Temp:  [95.9  F (35.5  C)-98  F (36.7  C)] 95.9  F (35.5  C)  Pulse:  [] 129  Resp:  [16-35] 21  BP: ()/() 124/108  MAP:  [63 mmHg-70 mmHg] 70 mmHg  Arterial Line BP: (109-131)/(46-47) 131/47  FiO2 (%):  [100 %] 100 %  SpO2:  [36 %-100 %] 72 %  0 lbs 0 oz    Physical Exam  Constitutional:       Appearance: She is ill-appearing.   HENT:      Nose: Nose normal.      Mouth/Throat:      Mouth: Mucous membranes are moist.      Comments: ET tube in place  Eyes:      Conjunctiva/sclera: Conjunctivae normal.   Cardiovascular:      Rate and Rhythm: Tachycardia present.   Pulmonary:      Comments: Full vent support  Abdominal:      General: Bowel sounds are normal.   Skin:     General: Skin is warm and dry.      Comments: Dressings intact   Psychiatric:      Comments: Unable to assess                  Current Problem List:   Principal Problem:    Elevated LFTs  Active Problems:    Pressure injury, stage 4, with infection (H)    Pressure injury of skin of contiguous region involving back and right hip, unspecified injury stage    Hyponatremia    Anemia, unspecified type    Chronic kidney disease, unspecified CKD stage      Allergies   Allergen Reactions     Dust Mites Other (See Comments)     Sneezing runny eyes and nose.     Other Environmental Allergy Hives     Mountain Dew     Pollen Extract Other (See Comments)     Sneezing runny eyes and nose.     Walnuts [Nuts] Hives            Data Reviewed:       Results for orders placed or performed during the hospital encounter of 05/08/23 (from the past 24 hour(s))   Glucose by meter   Result Value Ref Range    GLUCOSE BY METER POCT 148 (H) 70 - 99 mg/dL   CT Pelvis Soft Tissue w Contrast   Result Value Ref Range    Radiologist flags Minimal soft tissue gas anterior to the right (AA)     Narrative    CT PELVIS SOFT TISSUE W CONTRAST  5/11/2023 3:42 PM    CLINICAL HISTORY: Worsening sacral ulcer.    TECHNIQUE: CT scan of the pelvis was performed following injection of  IV contrast. Multiplanar reformats were obtained. Dose reduction  techniques were used.  CONTRAST: 65mL Isovue-370    COMPARISON: CT exams 2/16/2023 and 1/3/2023    FINDINGS:     PELVIC ORGANS: The urinary bladder is partially decompressed by a  Cornejo catheter. Mild colonic stool and moderate stool burden within  the distended rectum. Hysterectomy.    MUSCULOSKELETAL: Newly visualized upper right gluteal/lower right  sacral soft tissue defect measuring 8.3 x 1.2 x 7.5 cm. This is  consistent with interval debridement of the patient's sacral decubitus  ulcer. Note that this wound abuts the adjacent inferior coccyx. There  is also a newly visualized 8.6 x 3.4 x 9.2 cm soft tissue defect  adjacent to the right hip. This defect abuts the right proximal femur.  There is minimal soft tissue gas anterior to the right proximal femur  for example image 38 series 2 and image numbers 21-22 of series 5.  Mild surrounding subcutaneous edema. No adjacent periosteal reaction  or osteolysis. No fluid collection.       Impression    IMPRESSION:   1.  Status post debridement of the previously seen sacral decubitus  and right hip ulcers. Associated postoperative soft tissue defects as  described above. No associated abscess/drainable fluid collection.  2.  Minimal soft tissue gas anterior to the right proximal femur. This  could reflect a localized mild gas forming infection.  3.  No convincing CT evidence for osteomyelitis.     [Critical Result: Minimal soft tissue gas anterior to the right  proximal femur.]    Finding was identified on 5/11/2023 4:24 PM.     The [facility] on call Hospitalist  was contacted by me on 5/11/2023 5:36  PM and verbalized understanding of the critical result.     AGUSTIN PORTILLO MD         SYSTEM ID:  [id number]   Glucose by meter   Result Value Ref Range    GLUCOSE BY  METER POCT 149 (H) 70 - 99 mg/dL   Glucose by meter   Result Value Ref Range    GLUCOSE BY METER POCT 160 (H) 70 - 99 mg/dL   Glucose by meter   Result Value Ref Range    GLUCOSE BY METER POCT 149 (H) 70 - 99 mg/dL   Glucose by meter   Result Value Ref Range    GLUCOSE BY METER POCT 144 (H) 70 - 99 mg/dL   CBC with platelets   Result Value Ref Range    WBC Count 7.4 4.0 - 11.0 10e3/uL    RBC Count 3.29 (L) 3.80 - 5.20 10e6/uL    Hemoglobin 9.0 (L) 11.7 - 15.7 g/dL    Hematocrit 30.6 (L) 35.0 - 47.0 %    MCV 93 78 - 100 fL    MCH 27.4 26.5 - 33.0 pg    MCHC 29.4 (L) 31.5 - 36.5 g/dL    RDW 22.8 (H) 10.0 - 15.0 %    Platelet Count 417 150 - 450 10e3/uL   Basic metabolic panel   Result Value Ref Range    Sodium 137 136 - 145 mmol/L    Potassium 4.8 3.4 - 5.3 mmol/L    Chloride 104 98 - 107 mmol/L    Carbon Dioxide (CO2) 15 (L) 22 - 29 mmol/L    Anion Gap 18 (H) 7 - 15 mmol/L    Urea Nitrogen 35.0 (H) 8.0 - 23.0 mg/dL    Creatinine 1.17 (H) 0.51 - 0.95 mg/dL    Calcium 9.0 8.8 - 10.2 mg/dL    Glucose 284 (H) 70 - 99 mg/dL    GFR Estimate 50 (L) >60 mL/min/1.73m2   Troponin T, High Sensitivity   Result Value Ref Range    Troponin T, High Sensitivity 241 (HH) <=14 ng/L   Lactic acid whole blood   Result Value Ref Range    Lactic Acid 6.7 (HH) 0.7 - 2.0 mmol/L   INR   Result Value Ref Range    INR 1.49 (H) 0.85 - 1.15   Partial thromboplastin time   Result Value Ref Range    aPTT 41 (H) 22 - 38 Seconds   Blood gas venous with oxyhemoglobin   Result Value Ref Range    pH Venous 6.98 (LL) 7.32 - 7.43    pCO2 Venous 70 (H) 40 - 50 mm Hg    pO2 Venous 35 25 - 47 mm Hg    Bicarbonate Venous 16 (L) 21 - 28 mmol/L    FIO2 100     Oxyhemoglobin Venous 36 (L) 70 - 75 %    Base Excess/Deficit (+/-) -15.0 (L) -7.7 - 1.9 mmol/L   Magnesium   Result Value Ref Range    Magnesium 2.3 1.7 - 2.3 mg/dL   Phosphorus   Result Value Ref Range    Phosphorus 5.3 (H) 2.5 - 4.5 mg/dL   Extra Tube    Narrative    The following orders were created  for panel order Extra Tube.  Procedure                               Abnormality         Status                     ---------                               -----------         ------                     Extra Red Top Tube[[id number]]                               Final result                 Please view results for these tests on the individual orders.   Extra Red Top Tube   Result Value Ref Range    Hold Specimen JIC    Glucose by meter   Result Value Ref Range    GLUCOSE BY METER POCT 198 (H) 70 - 99 mg/dL   XR Chest Port 1 View    Narrative    EXAM: XR CHEST PORT 1 VIEW  LOCATION: [facility]  DATE/TIME: 5/12/2023 5:39 AM CDT    INDICATION: Endotracheal tube positioning  COMPARISON: CT 01/03/2023      Impression    IMPRESSION: Endotracheal tube 1.2 cm above olga and could be retracted slightly. Stable cardiomediastinal silhouette. Retrocardiac atelectasis or infiltrate. No significant effusion. Degenerative change osseous structures.   Central line    Narrative    Terry Dixon MD     5/12/2023  7:04 AM  [facility]    Central line    Date/Time: 5/12/2023 7:03 AM    Performed by: Terry Dixon MD  Authorized by: Terry Dixon MD  Indications: vascular access      UNIVERSAL PROTOCOL   Site Marked: NA  Prior Images Obtained and Reviewed:  NA  Required items: Required blood products, implants, devices and special   equipment available    Patient identity confirmed:  Anonymous protocol, patient   vented/unresponsive  NA - No sedation, light sedation, or local anesthesia  Confirmation Checklist:  Correct equipment/implants were available and   procedure was appropriate and matched the consent or emergent situation  Time out: Immediately prior to the procedure a time out was called    Universal Protocol: the Joint Commission Universal Protocol was followed    Preparation: Patient was prepped and draped in usual sterile fashion    ESBL (mL):   0    SEDATION    Patient Sedated: No      Preparation: skin prepped with ChloraPrep  Skin prep agent dried: skin prep agent completely dried prior to procedure  Sterile barriers: all five maximum sterile barriers used - cap, mask,   sterile gown, sterile gloves, and large sterile sheet  Hand hygiene: hand hygiene performed prior to central venous catheter   insertion  Patient position: flat  Catheter type: triple lumen  Pre-procedure: landmarks identified  Ultrasound guidance: yes  Sterile ultrasound techniques: sterile gel and sterile probe covers were   used  Number of attempts: 1  Successful placement: yes  Post-procedure: line sutured and dressing applied  Assessment: blood return through all ports and free fluid flow      PROCEDURE  Describe Procedure: Dx:  Cardiac arrest   Emergency consent after CPR   Patient Tolerance:  Patient tolerated the procedure well with no immediate   complications  Length of time physician/provider present for 1:1 monitoring during   sedation: 0   Blood gas arterial   Result Value Ref Range    pH Arterial 7.10 (LL) 7.35 - 7.45    pCO2 Arterial 35 35 - 45 mm Hg    pO2 Arterial 249 (H) 80 - 105 mm Hg    FIO2 100     Bicarbonate Arterial 11 (L) 21 - 28 mmol/L    Base Excess/Deficit (+/-) -17.6 (L) -9.0 - 1.8 mmol/L    Bernabe's Test Artline    Lactic acid whole blood   Result Value Ref Range    Lactic Acid 8.5 (HH) 0.7 - 2.0 mmol/L   Glucose by meter   Result Value Ref Range    GLUCOSE BY METER POCT 201 (H) 70 - 99 mg/dL   XR Chest Port 1 View    Narrative    XR CHEST PORT 1 VIEW 5/12/2023 8:40 AM    HISTORY: line placement    COMPARISON: Earlier today at 5:35 AM      Impression    IMPRESSION: The right neck catheter has been placed which appears to  course medially, although evaluation is slightly limited by patient  rotation. Based on this single radiograph, an arterial course of the  catheter is not entirely excluded. Please correlate clinically with  blood return/transducer tracing  from the catheter. Consider repeating  the radiograph with better positioning. If this is a venous catheter,  then the tip is in appropriate position, at the SVC/right atrial  junction. Pacer pads over the chest. Left basilar retrocardiac  atelectasis. Small left pleural effusion. No significant right pleural  effusion. No pneumothorax.    Findings were discussed with Dr. Dixon at 9:12 AM    EARLINE ORTIZ MD         SYSTEM ID:  [id number]   XR Abdomen Port 1 View    Narrative    XR ABDOMEN PORT 1 VIEW   5/12/2023 8:40 AM     HISTORY: OG verification    COMPARISON: CT of the abdomen and pelvis on 1/3/2023      Impression    IMPRESSION: Enteric tube tip and sidehole are in the stomach.    EARLINE ORTIZ MD         SYSTEM ID:  [id number]     *Note: Due to a large number of results and/or encounters for the requested time period, some results have not been displayed. A complete set of results can be found in Results Review.          Medical Decision Making     89 MINUTES SPENT BY ME on the date of service doing chart review, history, exam, documentation & further activities per the note.

## 2023-05-12 NOTE — SIGNIFICANT EVENT
At 3:45 am writer and charge nurse went to pt room to set up pulsate mattress. Pt noted with open eyes with labored breathing, O2 given via mask, BP checked 75/35. RRT called 3:50 am, At 3:55 am, no pulse, no breathing, code blue called and CPR started. Epi given at 3:55. 0357. 0401. Atropine at 0402, shock at 0402. Pt has pulse at 0402, intra-osseous access started at 0406, pt intubated.  BP at 0411 121/69, lab drawn. EKG wide QRS bbb, Lucus applied, not started. 0430 BP 65/51, paged, levo started 0435. BP 0436 38/20, 100 femoral pulse. No radial pulses. At 4:40 am BP 72/33. Pt transferred to ICU at 4:45 am.     At 4:38 lab called [facility] critical lactic 6.7.   Blood gas 6.98

## 2023-05-13 NOTE — DISCHARGE SUMMARY
[facility]  Hospitalist Discharge Summary      Date of Admission:  2023  Date of Discharge:  2023  Discharging Provider: Preston Rodrigez MD  Discharge Service: Hospitalist Service    Discharge Diagnoses       S/p cardiac arrest     Septic shock     Infected decubitus wounds and osteomyeleitis.     Sacrum Pressure Injury Stage: 4, (POA), Right hip Pressure Injury Stage: 4, (POA), Bilateral heels Pressure Injury, Unstageable, (POA),Right lateral 5th toe, Wound due to: Trauma vs pressure injury, (POA)      Clinically Significant Risk Factors     # DMII: A1C = N/A within past 6 months  # Severe Malnutrition: based on nutrition assessment      Discharge Disposition   Discharged to [facility] at discharge:     Hospital Course      Cristal Preciado is a 73 year old female admitted on 2023. She has a past medical history significant for diabetes mellitus type 2, hypertension, hyperlipidemia, coronary artery disease, chronic sacral decubitus ulcer, recent sepsis due to osteomyelitis requiring 8 weeks of antibiotics, previous stroke, GERD.  She was sent to emergency room due to concern for possible worsening of her chronic sacral wound.     Patient declined on the floor with code blue called on morning of May 12.Patient was then transferred to ICU intubated on vent .    Patient was treated in the ICU and consultation was intensive care medicine physician.  Patient was also seen by surgical team and care plan was discussed with multiple family members.  Patient required maximum support and remained encephalopathic and did not require any sedation.  Please review in detail progress note by intensivist today.    Patient coded morning of May 13 and cardiopulmonary resuscitation was attempted but unfortunately unable to establish ROSC and she  12.34 pm.  I had extensive discussion with sister Court and her brother in person regarding patient's decline earlier morning of  death.  However, we are unable to reach designated power of  Valerie for updates.  Multiple family numbers were at bedside.    Consultations This Hospital Stay   VASCULAR ACCESS ADULT IP CONSULT  INFECTIOUS DISEASES IP CONSULT  ORTHOPEDIC SURGERY IP CONSULT  WOUND OSTOMY CONTINENCE NURSE  IP CONSULT  ETHICS IP CONSULT  PALLIATIVE CARE ADULT IP CONSULT  SPEECH LANGUAGE PATH ADULT IP CONSULT  NUTRITION SERVICES ADULT IP CONSULT  VASCULAR ACCESS ADULT IP CONSULT  CHILD FAMILY LIFE IP CONSULT  SURGERY GENERAL IP CONSULT  NUTRITION SERVICES ADULT IP CONSULT  NEUROLOGY IP CONSULT    Code Status   Full Code    Time Spent on this Encounter   I, Preston Rodrigez MD, personally saw the patient today and spent greater than 30 minutes discharging this patient.       Preston Rodrigez MD  [facility] ICU  [address] [place] [address]  Phone: [phone]  Fax: [phone]  ______________________________________________________________________      Allergies   Allergies   Allergen Reactions     Dust Mites Other (See Comments)     Sneezing runny eyes and nose.     Other Environmental Allergy Hives     Mountain Dew     Pollen Extract Other (See Comments)     Sneezing runny eyes and nose.     Walnuts [Nuts] Hives

## 2023-05-13 NOTE — PROVIDER NOTIFICATION
DATE:  5/13/2023   TIME OF RECEIPT FROM LAB: 0405  LAB TEST: Hemoglobin / Lactic Acid / pH / Bicarb  LAB VALUE: 6.9 / 18 / 7.15 / 7  RESULTS GIVEN WITH READ-BACK TO: Miesha Burns RN  TIME LAB VALUE REPORTED TO PROVIDER: 0408

## 2023-05-13 NOTE — PROGRESS NOTES
I updated patients daughter, Court, at the beside.  I told her that her mother is likely to pass away today.

## 2023-05-13 NOTE — PROGRESS NOTES
@ [phone] Called Pts dtrs, Valerie and Court, to report update of labs and pt status. Unable to leave VM with Court,  left for Valerie.    @[phone]  Attempted to reach Valerie by phone. Left YANDY.

## 2023-05-13 NOTE — PROGRESS NOTES
ICU End of Shift Summary.  For vital signs and complete assessments, please see documentation flowsheets.      Pertinent assessments:  Eyes closed and Non-responsive at 0930. HR and BP labile, Tele ST, Long QT then rapidly change to junctional rhythm, BP would drop with drop in HR . Unable to acquire consistent SpO2 readings, reading captured were high 90s until decline @1200. Vasopressive gtts titrated up to max dosing with no improvement. No UOP. No BM. Dressing to R hip changed. Unable to reposition pt due to hemodynamically unstable condition, minor wt shifts were attempted.   Major Shift Events: HR and BP rapidly declined at 1220, no palpable pulses. Code Blue called @ 1226. CPR pads applied and compressions started. Epi x2 doses. MD stopped code at 1234 and pronounced pt.  Donor services contacted,  Donor services called, reference #[id number]. Pt is not candidate for organ, tissue or eye donation.    Discharge/Transfer Needs:  home information provided on Record of Death.

## 2023-05-13 NOTE — PLAN OF CARE
ICU End of Shift Summary.  For vital signs and complete assessments, please see documentation flowsheets.      Pertinent assessments: Pt had her eyes open much of the night with only short bouts with eyes closed. Does not follow commands. Pupils fixed with nystagmus noted - MD aware. Does appear to be tracking at times when name called but hard to decipher if movement is deliberate. Remains cool in the 96s despite warming blanket applied. LSs diminished in bases. Hard to get a consistent O2 reading but able to get it intermittently from forehead. Titrated FiO2 down to 50% after ABG results. No urine output tonight - MD aware. No BM. BSs very hypoactive throughout. NG hooked up to LIS with no output. Unable to do bath or wound care as patient was unstable with even slight movements. Micro turned a few times with patient not tolerating that well either. Head unable to be raised at all tonight without patient's BP declining significantly. Tele-Hub made aware of these things.  Major Shift Events: 2030 - Called Tele-Hub to discuss patient's increased need in Levophed with drip almost at max rate. SBP has been ok, but diastolic is so low that MAP remains < 65. Also notified that patient's pupils appear fixed at this time. Not following commands. Dr. Kenny placed an order for goal to be changed to SBP > 110.              - 2100 about Levo being maxed with BP still remaining low along with critical labs. Dr. Kenny camera'd in. Orders placed for Selvin and and Sodium Bicarb drip with D10 to stop. Dr. Kenny to call daughter, Court.            - 2155 Dr. Kenny camera'd in again after patient's BP and heart rate dropped despite pressors. Selvin increased to max dose. Pulse palpated throughout. 2 amps of Bicarb and 1 gram of Calcium pushed. BP  increased to SBP > 200s by 2200. Selvin stopped but eventually had to be restarted.            - 2216 - Called Court to discuss her mom and encourage her to come in if she would like to be  with her tonight due to her instability. Did not answer and voicemail box full. Court called back at 2219 and updated with plan for her to come in. Court not at bedside so called again at 0417 to update on patient with no answer and still unable to leave voicemail.             - 0415 - Called Tele-Hub to follow-up on recent critical labs along with patient's heart rhythm looking like Afib at times along with SVT in the 120s-130s at other times. [place] to notify Dr. Kenny. Called back at 0511 with no new orders per Dr. Kenny besides 1 unit of blood and Type and Screen. Informed them that RN unable to get a hold of daughter and no consent has been signed. Questioned if it should be signed emergently. Plan to get Type and Screen and wait for family to call or come in to sign consent as no emergent at this time.  Plan (Upcoming Events): Continue pressors and titrate as able. Continue vent support. Trend labs. Ethics and Palliative continue to work on plan of care with family. ID and Surgery also following along with Spiritual Health and Child Life.  Discharge/Transfer Needs: TBD. Continue ICU cares at this time.     Bedside Shift Report Completed   Bedside Safety Check Completed    Goal Outcome Evaluation:      Plan of Care Reviewed With: patient, child    Overall Patient Progress: decliningOverall Patient Progress: declining

## 2023-05-13 NOTE — PROGRESS NOTES
DATE:  5/13/2023   TIME OF RECEIPT FROM LAB:  2046  LAB TEST:  PH, Bicarb, Lactic Acid  LAB VALUE:  7.04, 6, 12.2  RESULTS GIVEN WITH READ-BACK TO (PROVIDER): MOSES Burns in Tele-Hub to report to Dr. Kenny  TIME LAB VALUE REPORTED TO PROVIDER:   2047

## 2023-05-13 NOTE — PROGRESS NOTES
DATE:  5/13/2023   TIME OF RECEIPT FROM LAB:  0024  LAB TEST:  PH, Bicarb, Lactic Acid  LAB VALUE:  7.17, 7, 16  RESULTS GIVEN WITH READ-BACK TO (PROVIDER): MOSES Burns in Tele-Hub to report to Dr. Kenny  TIME LAB VALUE REPORTED TO PROVIDER:   0025

## 2023-05-13 NOTE — PROGRESS NOTES
[facility] ICU VENTILATOR RESPIRATORY NOTE  Date of Admission: 5/8/23  Date of Intubation (most recent): 5/12/23  Reason for Mechanical Ventilation: Cardiac arrest  Number of Days on Mechanical Ventilation: 2  Met Criteria for Pressure Support Trial: No  Reason for No Pressure Support Trial:  On pressors, hemodynamically unstable  ABG Results: 7.15/21/99/7  Vent Mode: CMV/AC  (Continuous Mandatory Ventilation/ Assist Control)  FiO2 (%): 50 %  Resp Rate (Set): 25 breaths/min  Tidal Volume (Set, mL): 450 mL  PEEP (cm H2O): 5 cmH2O  Resp: (!) 31    Plan:  Continue to monitor.    Lesli Moy, RT

## 2023-05-13 NOTE — PROGRESS NOTES
Notified provider about indwelling chase catheter discussed removal or continued need.    Did provider choose to remove indwelling chase catheter? NO    Provider's chase indication for keeping indwelling chase catheter: Indication for continued use: Wound Healing    Is there an order for indwelling chase catheter? YES

## 2023-05-13 NOTE — PROGRESS NOTES
Called with critical labs values of lactate of 12.2 and PH of 7.04.  PCO2 at 22    Plan  Stop D10 drip and switch to bicarb in D5W  Daughter was called and updated and patient will remain full code    Leopoldo Kenny MD  Pulmonary, Critical Care and Sleep Medicine  [facility]  Pager: [phone]

## 2023-05-13 NOTE — PROGRESS NOTES
[facility] Intensive Care Progress Note    Date of Service (when I saw the patient): 05/13/2023     Assessment & Plan       Neurology:  Baseline cognitive impairment with strokes and evidence of contractures in upper extremities.   In -hospital cardiac arrest but witnessed with immediate CPR and return of spontaneous circulation in ~ 7 minutes.  Patient does have open eyes but no spontaneous movement and does not follow commands      P: no sedation but pain medications for CPR pain.   Observe for LOC.     Cardiovascular:  Cardiac arrest probably preceded by respiratory failure , no arrhythmia with CPR.  Bedside echo with good contractility and no pericardial effusion.    Progressive lactic acidosis and pressors needs despite IVF, steroids and antibiotics.   Junctional rhythm seconday to acidosis   I expect patient to die today despite full support   P: pressors   Serial lactates   -    Pulmonary:        Intubated for cardiac arrest.  Hypercapnia on initial ABG but also metabolic acidosis.  Hyperventilated for acidosis and hyperkalemia but are at max Ve now.   P: Ventilator 25 x 450 PEEP 5     -    Vent Mode: CMV/AC  (Continuous Mandatory Ventilation/ Assist Control)  FiO2 (%): 45 %  Resp Rate (Set): 25 breaths/min  Tidal Volume (Set, mL): 450 mL  PEEP (cm H2O): 5 cmH2O  Resp: 18      Renal:  Metabolic acidosis.  ALY, now hyperkalemia   Anuric   P:   -    ID:         Infected decubitus wounds and osteomyeleitis.  Surgery had recommended debridement on admission but this is not an option now   Currently on Zosyn   -    GI  -    Nutrition:  Chronic malnutrition     Endocrine:  Hyperglycemia with DM after CPR, now hypoglycemic seconday to sepsis and liver failure   P: D10 plus bicarb   -    Heme/Onc:  Hgb 6.9 but no benefit to transfusion at this point .   -    DVT Prophylaxis: Enoxaparin (Lovenox) SQ  GI Prophylaxis: PPI    Restraints: Restraints for medical healing  needed: NO    Family update by me today: No  RNs have made several unsuccessful phone calls to daughters. Patient remains full code but resuscitation would not improve outcome.      Terry Dixon MD    Time Spent on this Encounter   Billing:  I spent 35  minutes bedside and on the inpatient unit today managing the critical care of Cristal Preciado in relation to the issues listed in this note independent of procedures     Main Plans for Today   Mechanical ventilation  Pressors  Bicarb  Attempt to have daughter change mind regarding code status     Interval History   Increased pressors and progressive lactic acidosis      Physical Exam   Temp: (!) 96.5  F (35.8  C) (blanket warmer remains in place) Temp src: Axillary Temp  Min: 95.9  F (35.5  C)  Max: 97.2  F (36.2  C) BP: 120/80 Pulse: 75   Resp: 18 SpO2: 100 % O2 Device: Mechanical Ventilator    Vitals:    05/12/23 1515 05/13/23 0530   Weight: 59.4 kg (130 lb 15.3 oz) 64.6 kg (142 lb 6.7 oz)     I/O last 3 completed shifts:  In: 4340.74 [I.V.:3240.74; IV Piggyback:1100]  Out: 155 [Urine:155]    Neurologic:    Contractures bilateral. Pupils 3 mm bilateral, sluggish.  Diffuse muscle atrophy.  Opens eyes spontaneous but does not track or follow commands. Roving   Cardiovascular: RR w/o m   Respiratory:  Intubated  ETT ok  Lungs clear no crepitus   GI:  Soft non distended.   Skin/Extremities:  right hip wound dressed.  Poor perfusion of feet with signs of chronic ischemia, poor cap refill.  Hands are warmer. Mottling on thighs  Left arm midline.    Lines: No erythema or discharge at entry site for Arterial Line and Central Venous Catheter  Current lines are required for patient management    Medications     norepinephrine 0.51 mcg/kg/min (05/13/23 0800)     phenylephrine 0.5 mcg/kg/min (05/13/23 0800)     sodium bicarbonate 150 mEq in D5W 1,000 mL infusion 100 mL/hr at 05/13/23 0807     vasopressin 2.4 Units/hr (05/13/23 0811)       aspirin  81 mg Oral or NG Tube  Daily     carboxymethylcellulose PF  1 drop Both Eyes TID     dexamethasone  4 mg Intravenous Q6H     dorzolamide-timolol  1 drop Both Eyes BID     DULoxetine  90 mg Oral or NG Tube QAM     insulin aspart  1-7 Units Subcutaneous TID AC     insulin aspart  1-5 Units Subcutaneous At Bedtime     latanoprost  1 drop Both Eyes At Bedtime     multivitamin w/minerals  1 tablet Oral or Feeding Tube Daily     oxyCODONE  2.5 mg Oral Q8H     pantoprazole  40 mg Per Feeding Tube QAM AC    Or     pantoprazole  40 mg Intravenous QAM AC     piperacillin-tazobactam  3.375 g Intravenous Q6H     sodium chloride (PF)  10 mL Intracatheter Q8H     sodium chloride (PF)  10 mL Intracatheter Q8H       Data   Recent Labs   Lab 05/13/23  0753 05/13/23  0351 05/13/23  0347 05/12/23  1525 05/12/23  1521 05/12/23  1104 05/12/23  0453 05/12/23  0414 05/11/23  0841 05/11/23  0803   WBC  --   --  16.0*  --   --   --   --  7.4  --  6.6   HGB  --   --  6.9*  --   --   --   --  9.0*  --  9.7*   MCV  --   --  92  --   --   --   --  93  --  86   PLT  --   --  317  --   --   --   --  417  --  348   INR  --   --   --   --   --   --   --  1.49*  --   --    NA  --   --  142  --  141  --   --  137  --  137   POTASSIUM  --   --  5.8*  --  5.4*  --   --  4.8  --  4.3   CHLORIDE  --   --  107  --  111*  --   --  104  --  108*   CO2  --   --  7*  --  9*  --   --  15*  --  16*   BUN  --   --  32.5*  --  34.3*  --   --  35.0*  --  38.7*   CR  --   --  1.64*  --  1.37* 1.25*  --  1.17*  --  1.08*   ANIONGAP  --   --  28*  --  21*  --   --  18*  --  13   OLAF  --   --  8.4*  --  8.1*  --   --  9.0  --  8.8   GLC 96 104* 122*   < > 92  --    < > 284*   < > 170*   ALBUMIN  --   --  1.3*  --   --   --   --   --   --  2.0*   PROTTOTAL  --   --  4.5*  --   --   --   --   --   --  6.4   BILITOTAL  --   --  0.6  --   --   --   --   --   --  0.3   ALKPHOS  --   --  443*  --   --   --   --   --   --  764*   ALT  --   --  160*  --  123*  --   --   --   --  93*   AST  --   --   507*  --   --   --   --   --   --  202*    < > = values in this interval not displayed.     Recent Results (from the past 24 hour(s))   XR Chest Port 1 View    Narrative    XR CHEST PORT 1 VIEW 5/12/2023 8:40 AM    HISTORY: line placement    COMPARISON: Earlier today at 5:35 AM      Impression    IMPRESSION: The right neck catheter has been placed which appears to  course medially, although evaluation is slightly limited by patient  rotation. Based on this single radiograph, an arterial course of the  catheter is not entirely excluded. Please correlate clinically with  blood return/transducer tracing from the catheter. Consider repeating  the radiograph with better positioning. If this is a venous catheter,  then the tip is in appropriate position, at the SVC/right atrial  junction. Pacer pads over the chest. Left basilar retrocardiac  atelectasis. Small left pleural effusion. No significant right pleural  effusion. No pneumothorax.    Findings were discussed with Dr. Dixon at 9:12 AM    EARLINE ORTIZ MD         SYSTEM ID:  [id number]   XR Abdomen Port 1 View    Narrative    XR ABDOMEN PORT 1 VIEW   5/12/2023 8:40 AM     HISTORY: OG verification    COMPARISON: CT of the abdomen and pelvis on 1/3/2023      Impression    IMPRESSION: Enteric tube tip and sidehole are in the stomach.    EARLINE ORTIZ MD         SYSTEM ID:  [id number]

## 2023-05-13 NOTE — PROGRESS NOTES
DATE:  5/13/2023   TIME OF RECEIPT FROM LAB:  0813  LAB TEST:  Lactic 21, ABGs 7.08/21/129/6  RESULTS GIVEN WITH READ-BACK TO (PROVIDER): Destiny @ 15

## 2023-05-15 ENCOUNTER — PATIENT OUTREACH (OUTPATIENT)
Dept: GERIATRIC MEDICINE | Facility: CLINIC | Age: 73
End: 2023-05-15
Payer: COMMERCIAL

## 2023-05-15 NOTE — PROGRESS NOTES
[facility] Care Coordination Contact    Notified by E&E Kristel Bui at  [facility] that member passed away on 05/13/23 at Hospital.    PCP notified. Called all providers to cancel services.     For [facility]:  Death Notification form completed and faxed to [facility].    Chart reviewed and this CC's encounter closed. Chart handed off to CMS to process disenrollment tasks.    Laine Torres RN, PHN  Intuitional Care Coordinator [facility]  Cell [phone] Fax [phone]

## 2024-12-23 NOTE — PATIENT INSTRUCTIONS
Marco Preciado  1950     Give oxycodone 5 mg twice weekly 30 minutes prior to wound vac changes and may give 5 mg Q6H PRN for pain 7-10/10 dx: pressure ulcer  Narcan 0.2 mg Q2 minutes PRN for somnolence, RR < 8, unresponsiveness.      LAMONT Ramírez CNP on 4/6/2023 at 4:03 PM     Qbrexza Pregnancy And Lactation Text: There is no available data on Qbrexza use in pregnant women.  There is no available data on Qbrexza use in lactation. Clofazimine Pregnancy And Lactation Text: This medication is Pregnancy Category C and isn't considered safe during pregnancy. It is excreted in breast milk. Simponi Pregnancy And Lactation Text: The risk during pregnancy and breastfeeding is uncertain with this medication. Carac Counseling:  I discussed with the patient the risks of Carac including but not limited to erythema, scaling, itching, weeping, crusting, and pain. Hydroxyzine Pregnancy And Lactation Text: This medication is not safe during pregnancy and should not be taken. It is also excreted in breast milk and breast feeding isn't recommended. Cibinqo Pregnancy And Lactation Text: It is unknown if this medication will adversely affect pregnancy or breast feeding.  You should not take this medication if you are currently pregnant or planning a pregnancy or while breastfeeding. Erythromycin Counseling:  I discussed with the patient the risks of erythromycin including but not limited to GI upset, allergic reaction, drug rash, diarrhea, increase in liver enzymes, and yeast infections. Cyclophosphamide Pregnancy And Lactation Text: This medication is Pregnancy Category D and it isn't considered safe during pregnancy. This medication is excreted in breast milk. Erythromycin Pregnancy And Lactation Text: This medication is Pregnancy Category B and is considered safe during pregnancy. It is also excreted in breast milk. Skyrizi Counseling: I discussed with the patient the risks of risankizumab-rzaa including but not limited to immunosuppression, and serious infections.  The patient understands that monitoring is required including a PPD at baseline and must alert us or the primary physician if symptoms of infection or other concerning signs are noted. Sski Pregnancy And Lactation Text: This medication is Pregnancy Category D and isn't considered safe during pregnancy. It is excreted in breast milk. Eucrisa Pregnancy And Lactation Text: This medication has not been assigned a Pregnancy Risk Category but animal studies failed to show danger with the topical medication. It is unknown if the medication is excreted in breast milk. Topical Sulfur Applications Counseling: Topical Sulfur Counseling: Patient counseled that this medication may cause skin irritation or allergic reactions.  In the event of skin irritation, the patient was advised to reduce the amount of the drug applied or use it less frequently.   The patient verbalized understanding of the proper use and possible adverse effects of topical sulfur application.  All of the patient's questions and concerns were addressed. Enbrel Pregnancy And Lactation Text: This medication is Pregnancy Category B and is considered safe during pregnancy. It is unknown if this medication is excreted in breast milk. Birth Control Pills Counseling: Birth Control Pill Counseling: I discussed with the patient the potential side effects of OCPs including but not limited to increased risk of stroke, heart attack, thrombophlebitis, deep venous thrombosis, hepatic adenomas, breast changes, GI upset, headaches, and depression.  The patient verbalized understanding of the proper use and possible adverse effects of OCPs. All of the patient's questions and concerns were addressed. Thalidomide Counseling: I discussed with the patient the risks of thalidomide including but not limited to birth defects, anxiety, weakness, chest pain, dizziness, cough and severe allergy. Litfulo Counseling: I discussed with the patient the risks of Litfulo therapy including but not limited to upper respiratory tract infections, shingles, cold sores, and nausea. Live vaccines should be avoided.  This medication has been linked to serious infections; higher rate of mortality; malignancy and lymphoproliferative disorders; major adverse cardiovascular events; thrombosis; gastrointestinal perforations; neutropenia; lymphopenia; anemia; liver enzyme elevations; and lipid elevations. Carac Pregnancy And Lactation Text: This medication is Pregnancy Category X and contraindicated in pregnancy and in women who may become pregnant. It is unknown if this medication is excreted in breast milk. Rhofade Counseling: Rhofade is a topical medication which can decrease superficial blood flow where applied. Side effects are uncommon and include stinging, redness and allergic reactions. Hydroxychloroquine Pregnancy And Lactation Text: This medication has been shown to cause fetal harm but it isn't assigned a Pregnancy Risk Category. There are small amounts excreted in breast milk. Colchicine Counseling:  Patient counseled regarding adverse effects including but not limited to stomach upset (nausea, vomiting, stomach pain, or diarrhea).  Patient instructed to limit alcohol consumption while taking this medication.  Colchicine may reduce blood counts especially with prolonged use.  The patient understands that monitoring of kidney function and blood counts may be required, especially at baseline. The patient verbalized understanding of the proper use and possible adverse effects of colchicine.  All of the patient's questions and concerns were addressed. Cyclosporine Counseling:  I discussed with the patient the risks of cyclosporine including but not limited to hypertension, gingival hyperplasia,myelosuppression, immunosuppression, liver damage, kidney damage, neurotoxicity, lymphoma, and serious infections. The patient understands that monitoring is required including baseline blood pressure, CBC, CMP, lipid panel and uric acid, and then 1-2 times monthly CMP and blood pressure. Low Dose Naltrexone Counseling- I discussed with the patient the potential risks and side effects of low dose naltrexone including but not limited to: more vivid dreams, headaches, nausea, vomiting, abdominal pain, fatigue, dizziness, and anxiety. Metronidazole Counseling:  I discussed with the patient the risks of metronidazole including but not limited to seizures, nausea/vomiting, a metallic taste in the mouth, nausea/vomiting and severe allergy. Rhofade Pregnancy And Lactation Text: This medication has not been assigned a Pregnancy Risk Category. It is unknown if the medication is excreted in breast milk. Litfulo Pregnancy And Lactation Text: Based on animal studies, Lifulo may cause embryo-fetal harm when administered to pregnant women.  The medication should not be used in pregnancy.  Breastfeeding is not recommended during treatment. Calcipotriene Counseling:  I discussed with the patient the risks of calcipotriene including but not limited to erythema, scaling, itching, and irritation. Ozempic Counseling: I reviewed the possible side effects including: thyroid tumors, kidney disease, gallbladder disease, abdominal pain, constipation, diarrhea, nausea, vomiting and pancreatitis. Do not take this medication if you have a history or family history of multiple endocrine neoplasia syndrome type 2. Side effects reviewed, pt to contact office should one occur. Humira Counseling:  I discussed with the patient the risks of adalimumab including but not limited to myelosuppression, immunosuppression, autoimmune hepatitis, demyelinating diseases, lymphoma, and serious infections.  The patient understands that monitoring is required including a PPD at baseline and must alert us or the primary physician if symptoms of infection or other concerning signs are noted. Dutasteride Male Counseling: Dustasteride Counseling:  I discussed with the patient the risks of use of dutasteride including but not limited to decreased libido, decreased ejaculate volume, and gynecomastia. Women who can become pregnant should not handle medication.  All of the patient's questions and concerns were addressed. Topical Sulfur Applications Pregnancy And Lactation Text: This medication is considered safe during pregnancy and breast feeding secondary to limited systemic absorption. Birth Control Pills Pregnancy And Lactation Text: This medication should be avoided if pregnant and for the first 30 days post-partum. Hydroquinone Counseling:  Patient advised that medication may result in skin irritation, lightening (hypopigmentation), dryness, and burning.  In the event of skin irritation, the patient was advised to reduce the amount of the drug applied or use it less frequently.  Rarely, spots that are treated with hydroquinone can become darker (pseudoochronosis).  Should this occur, patient instructed to stop medication and call the office. The patient verbalized understanding of the proper use and possible adverse effects of hydroquinone.  All of the patient's questions and concerns were addressed. Dutasteride Female Counseling: Dutasteride Counseling:  I discussed with the patient the risks of use of dutasteride including but not limited to decreased libido and sexual dysfunction. Explained the teratogenic nature of the medication and stressed the importance of not getting pregnant during treatment. All of the patient's questions and concerns were addressed. Wartpeel Counseling:  I discussed with the patient the risks of Wartpeel including but not limited to erythema, scaling, itching, weeping, crusting, and pain. Spironolactone Counseling: Patient advised regarding risks of diarrhea, abdominal pain, hyperkalemia, birth defects (for female patients), liver toxicity and renal toxicity. The patient may need blood work to monitor liver and kidney function and potassium levels while on therapy. The patient verbalized understanding of the proper use and possible adverse effects of spironolactone.  All of the patient's questions and concerns were addressed. Oxempic Pregnancy And Lactation Text: The fetal risk of this medication is unknown and taking while pregnant is not recommended. It is unknown if this medication is present in breast milk. Acitretin Counseling:  I discussed with the patient the risks of acitretin including but not limited to hair loss, dry lips/skin/eyes, liver damage, hyperlipidemia, depression/suicidal ideation, photosensitivity.  Serious rare side effects can include but are not limited to pancreatitis, pseudotumor cerebri, bony changes, clot formation/stroke/heart attack.  Patient understands that alcohol is contraindicated since it can result in liver toxicity and significantly prolong the elimination of the drug by many years. Thalidomide Pregnancy And Lactation Text: This medication is Pregnancy Category X and is absolutely contraindicated during pregnancy. It is unknown if it is excreted in breast milk. Metronidazole Pregnancy And Lactation Text: This medication is Pregnancy Category B and considered safe during pregnancy.  It is also excreted in breast milk. Dapsone Counseling: I discussed with the patient the risks of dapsone including but not limited to hemolytic anemia, agranulocytosis, rashes, methemoglobinemia, kidney failure, peripheral neuropathy, headaches, GI upset, and liver toxicity.  Patients who start dapsone require monitoring including baseline LFTs and weekly CBCs for the first month, then every month thereafter.  The patient verbalized understanding of the proper use and possible adverse effects of dapsone.  All of the patient's questions and concerns were addressed. Tranexamic Acid Counseling:  Patient advised of the small risk of bleeding problems with tranexamic acid. They were also instructed to call if they developed any nausea, vomiting or diarrhea. All of the patient's questions and concerns were addressed. Acitretin Pregnancy And Lactation Text: This medication is Pregnancy Category X and should not be given to women who are pregnant or may become pregnant in the future. This medication is excreted in breast milk. Calcipotriene Pregnancy And Lactation Text: The use of this medication during pregnancy or lactation is not recommended as there is insufficient data. Olumiant Counseling: I discussed with the patient the risks of Olumiant therapy including but not limited to upper respiratory tract infections, shingles, cold sores, and nausea. Live vaccines should be avoided.  This medication has been linked to serious infections; higher rate of mortality; malignancy and lymphoproliferative disorders; major adverse cardiovascular events; thrombosis; gastrointestinal perforations; neutropenia; lymphopenia; anemia; liver enzyme elevations; and lipid elevations. Spevigo Counseling: I discussed with the patient the risks of Spevigo including but not limited to fatigue, nasuea, vomiting, headache, pruritus, urinary tract infection, an infusion related reactions.  The patient understands that monitoring is required including screening for tuberculosis at baseline and yearly screening thereafter while continuing Spevigo therapy. They should contact us if symptoms of infection or other concerning signs are noted. Solaraze Counseling:  I discussed with the patient the risks of Solaraze including but not limited to erythema, scaling, itching, weeping, crusting, and pain. Cyclosporine Pregnancy And Lactation Text: This medication is Pregnancy Category C and it isn't know if it is safe during pregnancy. This medication is excreted in breast milk. Low Dose Naltrexone Pregnancy And Lactation Text: Naltrexone is pregnancy category C.  There have been no adequate and well-controlled studies in pregnant women.  It should be used in pregnancy only if the potential benefit justifies the potential risk to the fetus.   Limited data indicates that naltrexone is minimally excreted into breastmilk. Imiquimod Counseling:  I discussed with the patient the risks of imiquimod including but not limited to erythema, scaling, itching, weeping, crusting, and pain.  Patient understands that the inflammatory response to imiquimod is variable from person to person and was educated regarded proper titration schedule.  If flu-like symptoms develop, patient knows to discontinue the medication and contact us. Methotrexate Counseling:  Patient counseled regarding adverse effects of methotrexate including but not limited to nausea, vomiting, abnormalities in liver function tests. Patients may develop mouth sores, rash, diarrhea, and abnormalities in blood counts. The patient understands that monitoring is required including LFT's and blood counts.  There is a rare possibility of scarring of the liver and lung problems that can occur when taking methotrexate. Persistent nausea, loss of appetite, pale stools, dark urine, cough, and shortness of breath should be reported immediately. Patient advised to discontinue methotrexate treatment at least three months before attempting to become pregnant.  I discussed the need for folate supplements while taking methotrexate.  These supplements can decrease side effects during methotrexate treatment. The patient verbalized understanding of the proper use and possible adverse effects of methotrexate.  All of the patient's questions and concerns were addressed. Niacinamide Counseling: I recommended taking niacin or niacinamide, also know as vitamin B3, twice daily. Recent evidence suggests that taking vitamin B3 (500 mg twice daily) can reduce the risk of actinic keratoses and non-melanoma skin cancers. Side effects of vitamin B3 include flushing and headache. Dutasteride Pregnancy And Lactation Text: This medication is absolutely contraindicated in women, especially during pregnancy and breast feeding. Feminization of male fetuses is possible if taking while pregnant. Spironolactone Pregnancy And Lactation Text: This medication can cause feminization of the male fetus and should be avoided during pregnancy. The active metabolite is also found in breast milk. Saxenda Counseling: I reviewed the possible side effects including: thyroid tumors, kidney disease, gallbladder disease, abdominal pain, constipation, diarrhea, nausea, vomiting and pancreatitis. Do not take this medication if you have a history or family history of multiple endocrine neoplasia syndrome type 2. Side effects reviewed, pt to contact office should one occur. Hyrimoz Counseling:  I discussed with the patient the risks of adalimumab including but not limited to myelosuppression, immunosuppression, autoimmune hepatitis, demyelinating diseases, lymphoma, and serious infections.  The patient understands that monitoring is required including a PPD at baseline and must alert us or the primary physician if symptoms of infection or other concerning signs are noted. Cantharidin Counseling:  I discussed with the patient the risks of Cantharidin including but not limited to pain, redness, burning, itching, and blistering. Detail Level: Simple Finasteride Male Counseling: Finasteride Counseling:  I discussed with the patient the risks of use of finasteride including but not limited to decreased libido, decreased ejaculate volume, gynecomastia, and depression. Women should not handle medication.  All of the patient's questions and concerns were addressed. Opioid Counseling: I discussed with the patient the potential side effects of opioids including but not limited to addiction, altered mental status, and depression. I stressed avoiding alcohol, benzodiazepines, muscle relaxants and sleep aids unless specifically okayed by a physician. The patient verbalized understanding of the proper use and possible adverse effects of opioids. All of the patient's questions and concerns were addressed. They were instructed to flush the remaining pills down the toilet if they did not need them for pain. Solaraze Pregnancy And Lactation Text: This medication is Pregnancy Category B and is considered safe. There is some data to suggest avoiding during the third trimester. It is unknown if this medication is excreted in breast milk. Dapsone Pregnancy And Lactation Text: This medication is Pregnancy Category C and is not considered safe during pregnancy or breast feeding. Spevigo Pregnancy And Lactation Text: The risk during pregnancy and breastfeeding is uncertain with this medication. This medication does cross the placenta. It is unknown if this medication is found in breast milk. Olumiant Pregnancy And Lactation Text: Based on animal studies, Olumiant may cause embryo-fetal harm when administered to pregnant women.  The medication should not be used in pregnancy.  Breastfeeding is not recommended during treatment. Minocycline Counseling: Patient advised regarding possible photosensitivity and discoloration of the teeth, skin, lips, tongue and gums.  Patient instructed to avoid sunlight, if possible.  When exposed to sunlight, patients should wear protective clothing, sunglasses, and sunscreen.  The patient was instructed to call the office immediately if the following severe adverse effects occur:  hearing changes, easy bruising/bleeding, severe headache, or vision changes.  The patient verbalized understanding of the proper use and possible adverse effects of minocycline.  All of the patient's questions and concerns were addressed. Erivedge Counseling- I discussed with the patient the risks of Erivedge including but not limited to nausea, vomiting, diarrhea, constipation, weight loss, changes in the sense of taste, decreased appetite, muscle spasms, and hair loss.  The patient verbalized understanding of the proper use and possible adverse effects of Erivedge.  All of the patient's questions and concerns were addressed. Dupixent Counseling: I discussed with the patient the risks of dupilumab including but not limited to eye infection and irritation, cold sores, injection site reactions, worsening of asthma, allergic reactions and increased risk of parasitic infection.  Live vaccines should be avoided while taking dupilumab. Dupilumab will also interact with certain medications such as warfarin and cyclosporine. The patient understands that monitoring is required and they must alert us or the primary physician if symptoms of infection or other concerning signs are noted. Azathioprine Counseling:  I discussed with the patient the risks of azathioprine including but not limited to myelosuppression, immunosuppression, hepatotoxicity, lymphoma, and infections.  The patient understands that monitoring is required including baseline LFTs, Creatinine, possible TPMP genotyping and weekly CBCs for the first month and then every 2 weeks thereafter.  The patient verbalized understanding of the proper use and possible adverse effects of azathioprine.  All of the patient's questions and concerns were addressed. Ivermectin Pregnancy And Lactation Text: This medication is Pregnancy Category C and it isn't known if it is safe during pregnancy. It is also excreted in breast milk. Cantharidin Pregnancy And Lactation Text: This medication has not been proven safe during pregnancy. It is unknown if this medication is excreted in breast milk. Topical Ketoconazole Pregnancy And Lactation Text: This medication is Pregnancy Category B and is considered safe during pregnancy. It is unknown if it is excreted in breast milk. Oxybutynin Counseling:  I discussed with the patient the risks of oxybutynin including but not limited to skin rash, drowsiness, dry mouth, difficulty urinating, and blurred vision. Picato Pregnancy And Lactation Text: This medication is Pregnancy Category C. It is unknown if this medication is excreted in breast milk. Terbinafine Pregnancy And Lactation Text: This medication is Pregnancy Category B and is considered safe during pregnancy. It is also excreted in breast milk and breast feeding isn't recommended. Gabapentin Counseling: I discussed with the patient the risks of gabapentin including but not limited to dizziness, somnolence, fatigue and ataxia. Azelaic Acid Counseling: Patient counseled that medicine may cause skin irritation and to avoid applying near the eyes.  In the event of skin irritation, the patient was advised to reduce the amount of the drug applied or use it less frequently.   The patient verbalized understanding of the proper use and possible adverse effects of azelaic acid.  All of the patient's questions and concerns were addressed. Clindamycin Counseling: I counseled the patient regarding use of clindamycin as an antibiotic for prophylactic and/or therapeutic purposes. Clindamycin is active against numerous classes of bacteria, including skin bacteria. Side effects may include nausea, diarrhea, gastrointestinal upset, rash, hives, yeast infections, and in rare cases, colitis. Drysol Pregnancy And Lactation Text: This medication is considered safe during pregnancy and breast feeding. Clindamycin Pregnancy And Lactation Text: This medication can be used in pregnancy if certain situations. Clindamycin is also present in breast milk. Azathioprine Pregnancy And Lactation Text: This medication is Pregnancy Category D and isn't considered safe during pregnancy. It is unknown if this medication is excreted in breast milk. Simlandi Counseling:  I discussed with the patient the risks of adalimumab including but not limited to myelosuppression, immunosuppression, autoimmune hepatitis, demyelinating diseases, lymphoma, and serious infections.  The patient understands that monitoring is required including a PPD at baseline and must alert us or the primary physician if symptoms of infection or other concerning signs are noted. Topical Metronidazole Counseling: Metronidazole is a topical antibiotic medication. You may experience burning, stinging, redness, or allergic reactions.  Please call our office if you develop any problems from using this medication. Dupixent Pregnancy And Lactation Text: This medication likely crosses the placenta but the risk for the fetus is uncertain. This medication is excreted in breast milk. Propranolol Counseling:  I discussed with the patient the risks of propranolol including but not limited to low heart rate, low blood pressure, low blood sugar, restlessness and increased cold sensitivity. They should call the office if they experience any of these side effects. Ebglyss Counseling: I discussed with the patient the risks of lebrikizumab including but not limited to eye inflammation and irritation, cold sores, injection site reactions, allergic reactions and increased risk of parasitic infection. The patient understands that monitoring is required and they must alert us or the primary physician if symptoms of infection or other concerning signs are noted. Protopic Counseling: Patient may experience a mild burning sensation during topical application. Protopic is not approved in children less than 2 years of age. There have been case reports of hematologic and skin malignancies in patients using topical calcineurin inhibitors although causality is questionable. Arava Counseling:  Patient counseled regarding adverse effects of Arava including but not limited to nausea, vomiting, abnormalities in liver function tests. Patients may develop mouth sores, rash, diarrhea, and abnormalities in blood counts. The patient understands that monitoring is required including LFTs and blood counts.  There is a rare possibility of scarring of the liver and lung problems that can occur when taking methotrexate. Persistent nausea, loss of appetite, pale stools, dark urine, cough, and shortness of breath should be reported immediately. Patient advised to discontinue Arava treatment and consult with a physician prior to attempting conception. The patient will have to undergo a treatment to eliminate Arava from the body prior to conception. Cellcept Counseling:  I discussed with the patient the risks of mycophenolate mofetil including but not limited to infection/immunosuppression, GI upset, hypokalemia, hypercholesterolemia, bone marrow suppression, lymphoproliferative disorders, malignancy, GI ulceration/bleed/perforation, colitis, interstitial lung disease, kidney failure, progressive multifocal leukoencephalopathy, and birth defects.  The patient understands that monitoring is required including a baseline creatinine and regular CBC testing. In addition, patient must alert us immediately if symptoms of infection or other concerning signs are noted. Glycopyrrolate Counseling:  I discussed with the patient the risks of glycopyrrolate including but not limited to skin rash, drowsiness, dry mouth, difficulty urinating, and blurred vision. Doxycycline Counseling:  Patient counseled regarding possible photosensitivity and increased risk for sunburn.  Patient instructed to avoid sunlight, if possible.  When exposed to sunlight, patients should wear protective clothing, sunglasses, and sunscreen.  The patient was instructed to call the office immediately if the following severe adverse effects occur:  hearing changes, easy bruising/bleeding, severe headache, or vision changes.  The patient verbalized understanding of the proper use and possible adverse effects of doxycycline.  All of the patient's questions and concerns were addressed. Protopic Pregnancy And Lactation Text: This medication is Pregnancy Category C. It is unknown if this medication is excreted in breast milk when applied topically. Topical Metronidazole Pregnancy And Lactation Text: This medication is Pregnancy Category B and considered safe during pregnancy.  It is also considered safe to use while breastfeeding. Elidel Counseling: Patient may experience a mild burning sensation during topical application. Elidel is not approved in children less than 2 years of age. There have been case reports of hematologic and skin malignancies in patients using topical calcineurin inhibitors although causality is questionable. Propranolol Pregnancy And Lactation Text: This medication is Pregnancy Category C and it isn't known if it is safe during pregnancy. It is excreted in breast milk. Ebglyss Pregnancy And Lactation Text: This medication likely crosses the placenta but the risk for the fetus is uncertain. It is unknown if this medication is excreted in breast milk. Topical Steroids Counseling: I discussed with the patient that prolonged use of topical steroids can result in the increased appearance of superficial blood vessels (telangiectasias), lightening (hypopigmentation) and thinning of the skin (atrophy).  Patient understands to avoid using high potency steroids in skin folds, the groin or the face.  The patient verbalized understanding of the proper use and possible adverse effects of topical steroids.  All of the patient's questions and concerns were addressed. Doxepin Pregnancy And Lactation Text: This medication is Pregnancy Category C and it isn't known if it is safe during pregnancy. It is also excreted in breast milk and breast feeding isn't recommended. Benzoyl Peroxide Counseling: Patient counseled that medicine may cause skin irritation and bleach clothing.  In the event of skin irritation, the patient was advised to reduce the amount of the drug applied or use it less frequently.   The patient verbalized understanding of the proper use and possible adverse effects of benzoyl peroxide.  All of the patient's questions and concerns were addressed. Doxycycline Pregnancy And Lactation Text: This medication is Pregnancy Category D and not consider safe during pregnancy. It is also excreted in breast milk but is considered safe for shorter treatment courses. Hydroxyzine Counseling: Patient advised that the medication is sedating and not to drive a car after taking this medication.  Patient informed of potential adverse effects including but not limited to dry mouth, urinary retention, and blurry vision.  The patient verbalized understanding of the proper use and possible adverse effects of hydroxyzine.  All of the patient's questions and concerns were addressed. Simponi Counseling:  I discussed with the patient the risks of golimumab including but not limited to myelosuppression, immunosuppression, autoimmune hepatitis, demyelinating diseases, lymphoma, and serious infections.  The patient understands that monitoring is required including a PPD at baseline and must alert us or the primary physician if symptoms of infection or other concerning signs are noted. Benzoyl Peroxide Pregnancy And Lactation Text: This medication is Pregnancy Category C. It is unknown if benzoyl peroxide is excreted in breast milk. Cibinqo Counseling: I discussed with the patient the risks of Cibinqo therapy including but not limited to common cold, nausea, headache, cold sores, increased blood CPK levels, dizziness, UTIs, fatigue, acne, and vomitting. Live vaccines should be avoided.  This medication has been linked to serious infections; higher rate of mortality; malignancy and lymphoproliferative disorders; major adverse cardiovascular events; thrombosis; thrombocytopenia and lymphopenia; lipid elevations; and retinal detachment. Clofazimine Counseling:  I discussed with the patient the risks of clofazimine including but not limited to skin and eye pigmentation, liver damage, nausea/vomiting, gastrointestinal bleeding and allergy. Glycopyrrolate Pregnancy And Lactation Text: This medication is Pregnancy Category B and is considered safe during pregnancy. It is unknown if it is excreted breast milk. Qbrexza Counseling:  I discussed with the patient the risks of Qbrexza including but not limited to headache, mydriasis, blurred vision, dry eyes, nasal dryness, dry mouth, dry throat, dry skin, urinary hesitation, and constipation.  Local skin reactions including erythema, burning, stinging, and itching can also occur. Topical Steroids Applications Pregnancy And Lactation Text: Most topical steroids are considered safe to use during pregnancy and lactation.  Any topical steroid applied to the breast or nipple should be washed off before breastfeeding. Cyclophosphamide Counseling:  I discussed with the patient the risks of cyclophosphamide including but not limited to hair loss, hormonal abnormalities, decreased fertility, abdominal pain, diarrhea, nausea and vomiting, bone marrow suppression and infection. The patient understands that monitoring is required while taking this medication. Hydroxychloroquine Counseling:  I discussed with the patient that a baseline ophthalmologic exam is needed at the start of therapy and every year thereafter while on therapy. A CBC may also be warranted for monitoring.  The side effects of this medication were discussed with the patient, including but not limited to agranulocytosis, aplastic anemia, seizures, rashes, retinopathy, and liver toxicity. Patient instructed to call the office should any adverse effect occur.  The patient verbalized understanding of the proper use and possible adverse effects of Plaquenil.  All the patient's questions and concerns were addressed. Enbrel Counseling:  I discussed with the patient the risks of etanercept including but not limited to myelosuppression, immunosuppression, autoimmune hepatitis, demyelinating diseases, lymphoma, and infections.  The patient understands that monitoring is required including a PPD at baseline and must alert us or the primary physician if symptoms of infection or other concerning signs are noted. Finasteride Pregnancy And Lactation Text: This medication is absolutely contraindicated during pregnancy. It is unknown if it is excreted in breast milk. Eucrisa Counseling: Patient may experience a mild burning sensation during topical application. Eucrisa is not approved in children less than 3 months of age. SSKI Counseling:  I discussed with the patient the risks of SSKI including but not limited to thyroid abnormalities, metallic taste, GI upset, fever, headache, acne, arthralgias, paraesthesias, lymphadenopathy, easy bleeding, arrhythmias, and allergic reaction. Azithromycin Pregnancy And Lactation Text: This medication is considered safe during pregnancy and is also secreted in breast milk. Nemluvio Counseling: I discussed with the patient the risks of nemolizumab including but not limited to headache, gastrointestinal complaints, nasopharyngitis, musculoskeletal complaints, injection site reactions, and allergic reactions. The patient understands that monitoring is required and they must alert us or the primary physician if any side effects are noted. Zoryve Pregnancy And Lactation Text: It is unknown if this medication can cause problems during pregnancy and breastfeeding. Mirvaso Counseling: Mirvaso is a topical medication which can decrease superficial blood flow where applied. Side effects are uncommon and include stinging, redness and allergic reactions. Zepbound Counseling: I reviewed the possible side effects including: thyroid tumors, kidney disease, gallbladder disease, abdominal pain, constipation, diarrhea, nausea, vomiting and pancreatitis. Do not take this medication if you have a history or family history of multiple endocrine neoplasia syndrome type 2. Side effects reviewed, pt to contact office should one occur. Xeljanz Counseling: I discussed with the patient the risks of Xeljanz therapy including increased risk of infection, liver issues, headache, diarrhea, or cold symptoms. Live vaccines should be avoided. They were instructed to call if they have any problems. Bimzelx Pregnancy And Lactation Text: This medication crosses the placenta and the safety is uncertain during pregnancy. It is unknown if this medication is present in breast milk. Itraconazole Counseling:  I discussed with the patient the risks of itraconazole including but not limited to liver damage, nausea/vomiting, neuropathy, and severe allergy.  The patient understands that this medication is best absorbed when taken with acidic beverages such as non-diet cola or ginger ale.  The patient understands that monitoring is required including baseline LFTs and repeat LFTs at intervals.  The patient understands that they are to contact us or the primary physician if concerning signs are noted. High Dose Vitamin A Pregnancy And Lactation Text: High dose vitamin A therapy is contraindicated during pregnancy and breast feeding. Sarecycline Counseling: Patient advised regarding possible photosensitivity and discoloration of the teeth, skin, lips, tongue and gums.  Patient instructed to avoid sunlight, if possible.  When exposed to sunlight, patients should wear protective clothing, sunglasses, and sunscreen.  The patient was instructed to call the office immediately if the following severe adverse effects occur:  hearing changes, easy bruising/bleeding, severe headache, or vision changes.  The patient verbalized understanding of the proper use and possible adverse effects of sarecycline.  All of the patient's questions and concerns were addressed. Cimzia Counseling:  I discussed with the patient the risks of Cimzia including but not limited to immunosuppression, allergic reactions and infections.  The patient understands that monitoring is required including a PPD at baseline and must alert us or the primary physician if symptoms of infection or other concerning signs are noted. Tazorac Pregnancy And Lactation Text: This medication is not safe during pregnancy. It is unknown if this medication is excreted in breast milk. Xelteriz Pregnancy And Lactation Text: This medication is Pregnancy Category D and is not considered safe during pregnancy.  The risk during breast feeding is also uncertain. Oral Minoxidil Counseling- I discussed with the patient the risks of oral minoxidil including but not limited to shortness of breath, swelling of the feet or ankles, dizziness, lightheadedness, unwanted hair growth and allergic reaction.  The patient verbalized understanding of the proper use and possible adverse effects of oral minoxidil.  All of the patient's questions and concerns were addressed. Zyclara Counseling:  I discussed with the patient the risks of imiquimod including but not limited to erythema, scaling, itching, weeping, crusting, and pain.  Patient understands that the inflammatory response to imiquimod is variable from person to person and was educated regarded proper titration schedule.  If flu-like symptoms develop, patient knows to discontinue the medication and contact us. Albendazole Counseling:  I discussed with the patient the risks of albendazole including but not limited to cytopenia, kidney damage, nausea/vomiting and severe allergy.  The patient understands that this medication is being used in an off-label manner. Oral Minoxidil Pregnancy And Lactation Text: This medication should only be used when clearly needed if you are pregnant, attempting to become pregnant or breast feeding. Itraconazole Pregnancy And Lactation Text: This medication is Pregnancy Category C and it isn't know if it is safe during pregnancy. It is also excreted in breast milk. Nemluvio Pregnancy And Lactation Text: It is not known if Nemluvio causes fetal harm or is present in breast milk. Please proceed with caution if patients who are pregnant or breastfeeding. Bactrim Counseling:  I discussed with the patient the risks of sulfa antibiotics including but not limited to GI upset, allergic reaction, drug rash, diarrhea, dizziness, photosensitivity, and yeast infections.  Rarely, more serious reactions can occur including but not limited to aplastic anemia, agranulocytosis, methemoglobinemia, blood dyscrasias, liver or kidney failure, lung infiltrates or desquamative/blistering drug rashes. Topical Clindamycin Counseling: Patient counseled that this medication may cause skin irritation or allergic reactions.  In the event of skin irritation, the patient was advised to reduce the amount of the drug applied or use it less frequently.   The patient verbalized understanding of the proper use and possible adverse effects of clindamycin.  All of the patient's questions and concerns were addressed. Ketoconazole Counseling:   Patient counseled regarding improving absorption with orange juice.  Adverse effects include but are not limited to breast enlargement, headache, diarrhea, nausea, upset stomach, liver function test abnormalities, taste disturbance, and stomach pain.  There is a rare possibility of liver failure that can occur when taking ketoconazole. The patient understands that monitoring of LFTs may be required, especially at baseline. The patient verbalized understanding of the proper use and possible adverse effects of ketoconazole.  All of the patient's questions and concerns were addressed. Xolair Counseling:  Patient informed of potential adverse effects including but not limited to fever, muscle aches, rash and allergic reactions.  The patient verbalized understanding of the proper use and possible adverse effects of Xolair.  All of the patient's questions and concerns were addressed. Cimzia Pregnancy And Lactation Text: This medication crosses the placenta but can be considered safe in certain situations. Cimzia may be excreted in breast milk. Sarecycline Pregnancy And Lactation Text: This medication is Pregnancy Category D and not consider safe during pregnancy. It is also excreted in breast milk. Otezla Counseling: The side effects of Otezla were discussed with the patient, including but not limited to worsening or new depression, weight loss, diarrhea, nausea, upper respiratory tract infection, and headache. Patient instructed to call the office should any adverse effect occur.  The patient verbalized understanding of the proper use and possible adverse effects of Otezla.  All the patient's questions and concerns were addressed. Xolair Pregnancy And Lactation Text: This medication is Pregnancy Category B and is considered safe during pregnancy. This medication is excreted in breast milk. Cosentyx Counseling:  I discussed with the patient the risks of Cosentyx including but not limited to worsening of Crohn's disease, immunosuppression, allergic reactions and infections.  The patient understands that monitoring is required including a PPD at baseline and must alert us or the primary physician if symptoms of infection or other concerning signs are noted. Opzelura Counseling:  I discussed with the patient the risks of Opzelura including but not limited to nasopharngitis, bronchitis, ear infection, eosinophila, hives, diarrhea, folliculitis, tonsillitis, and rhinorrhea.  Taken orally, this medication has been linked to serious infections; higher rate of mortality; malignancy and lymphoproliferative disorders; major adverse cardiovascular events; thrombosis; thrombocytopenia, anemia, and neutropenia; and lipid elevations. Rituxan Counseling:  I discussed with the patient the risks of Rituxan infusions. Side effects can include infusion reactions, severe drug rashes including mucocutaneous reactions, reactivation of latent hepatitis and other infections and rarely progressive multifocal leukoencephalopathy.  All of the patient's questions and concerns were addressed. Bactrim Pregnancy And Lactation Text: This medication is Pregnancy Category D and is known to cause fetal risk.  It is also excreted in breast milk. Opzelura Pregnancy And Lactation Text: There is insufficient data to evaluate drug-associated risk for major birth defects, miscarriage, or other adverse maternal or fetal outcomes.  There is a pregnancy registry that monitors pregnancy outcomes in pregnant persons exposed to the medication during pregnancy.  It is unknown if this medication is excreted in breast milk.  Do not breastfeed during treatment and for about 4 weeks after the last dose. Cephalexin Counseling: I counseled the patient regarding use of cephalexin as an antibiotic for prophylactic and/or therapeutic purposes. Cephalexin (commonly prescribed under brand name Keflex) is a cephalosporin antibiotic which is active against numerous classes of bacteria, including most skin bacteria. Side effects may include nausea, diarrhea, gastrointestinal upset, rash, hives, yeast infections, and in rare cases, hepatitis, kidney disease, seizures, fever, confusion, neurologic symptoms, and others. Patients with severe allergies to penicillin medications are cautioned that there is about a 10% incidence of cross-reactivity with cephalosporins. When possible, patients with penicillin allergies should use alternatives to cephalosporins for antibiotic therapy. Ketoconazole Pregnancy And Lactation Text: This medication is Pregnancy Category C and it isn't know if it is safe during pregnancy. It is also excreted in breast milk and breast feeding isn't recommended. Tetracycline Counseling: Patient counseled regarding possible photosensitivity and increased risk for sunburn.  Patient instructed to avoid sunlight, if possible.  When exposed to sunlight, patients should wear protective clothing, sunglasses, and sunscreen.  The patient was instructed to call the office immediately if the following severe adverse effects occur:  hearing changes, easy bruising/bleeding, severe headache, or vision changes.  The patient verbalized understanding of the proper use and possible adverse effects of tetracycline.  All of the patient's questions and concerns were addressed. Patient understands to avoid pregnancy while on therapy due to potential birth defects. Ivermectin Counseling:  Patient instructed to take medication on an empty stomach with a full glass of water.  Patient informed of potential adverse effects including but not limited to nausea, diarrhea, dizziness, itching, and swelling of the extremities or lymph nodes.  The patient verbalized understanding of the proper use and possible adverse effects of ivermectin.  All of the patient's questions and concerns were addressed. Otezla Pregnancy And Lactation Text: This medication is Pregnancy Category C and it isn't known if it is safe during pregnancy. It is unknown if it is excreted in breast milk. Topical Ketoconazole Counseling: Patient counseled that this medication may cause skin irritation or allergic reactions.  In the event of skin irritation, the patient was advised to reduce the amount of the drug applied or use it less frequently.   The patient verbalized understanding of the proper use and possible adverse effects of ketoconazole.  All of the patient's questions and concerns were addressed. Rituxan Pregnancy And Lactation Text: This medication is Pregnancy Category C and it isn't know if it is safe during pregnancy. It is unknown if this medication is excreted in breast milk but similar antibodies are known to be excreted. Aklief counseling:  Patient advised to apply a pea-sized amount only at bedtime and wait 30 minutes after washing their face before applying.  If too drying, patient may add a non-comedogenic moisturizer.  The most commonly reported side effects including irritation, redness, scaling, dryness, stinging, burning, itching, and increased risk of sunburn.  The patient verbalized understanding of the proper use and possible adverse effects of retinoids.  All of the patient's questions and concerns were addressed. Cephalexin Pregnancy And Lactation Text: This medication is Pregnancy Category B and considered safe during pregnancy.  It is also excreted in breast milk but can be used safely for shorter doses. Terbinafine Counseling: Patient counseling regarding adverse effects of terbinafine including but not limited to headache, diarrhea, rash, upset stomach, liver function test abnormalities, itching, taste/smell disturbance, nausea, abdominal pain, and flatulence.  There is a rare possibility of liver failure that can occur when taking terbinafine.  The patient understands that a baseline LFT and kidney function test may be required. The patient verbalized understanding of the proper use and possible adverse effects of terbinafine.  All of the patient's questions and concerns were addressed. Siliq Counseling:  I discussed with the patient the risks of Siliq including but not limited to new or worsening depression, suicidal thoughts and behavior, immunosuppression, malignancy, posterior leukoencephalopathy syndrome, and serious infections.  The patient understands that monitoring is required including a PPD at baseline and must alert us or the primary physician if symptoms of infection or other concerning signs are noted. There is also a special program designed to monitor depression which is required with Siliq. Picato Counseling:  I discussed with the patient the risks of Picato including but not limited to erythema, scaling, itching, weeping, crusting, and pain. Aklief Pregnancy And Lactation Text: It is unknown if this medication is safe to use during pregnancy.  It is unknown if this medication is excreted in breast milk.  Breastfeeding women should use the topical cream on the smallest area of the skin for the shortest time needed while breastfeeding.  Do not apply to nipple and areola. Methotrexate Pregnancy And Lactation Text: This medication is Pregnancy Category X and is known to cause fetal harm. This medication is excreted in breast milk. Niacinamide Pregnancy And Lactation Text: These medications are considered safe during pregnancy. Opioid Pregnancy And Lactation Text: These medications can lead to premature delivery and should be avoided during pregnancy. These medications are also present in breast milk in small amounts. Bexarotene Counseling:  I discussed with the patient the risks of bexarotene including but not limited to hair loss, dry lips/skin/eyes, liver abnormalities, hyperlipidemia, pancreatitis, depression/suicidal ideation, photosensitivity, drug rash/allergic reactions, hypothyroidism, anemia, leukopenia, infection, cataracts, and teratogenicity.  Patient understands that they will need regular blood tests to check lipid profile, liver function tests, white blood cell count, thyroid function tests and pregnancy test if applicable. Tranexamic Acid Pregnancy And Lactation Text: It is unknown if this medication is safe during pregnancy or breast feeding. Winlevi Counseling:  I discussed with the patient the risks of topical clascoterone including but not limited to erythema, scaling, itching, and stinging. Patient voiced their understanding. Bexarotene Pregnancy And Lactation Text: This medication is Pregnancy Category X and should not be given to women who are pregnant or may become pregnant. This medication should not be used if you are breast feeding. Valtrex Counseling: I discussed with the patient the risks of valacyclovir including but not limited to kidney damage, nausea, vomiting and severe allergy.  The patient understands that if the infection seems to be worsening or is not improving, they are to call. Ilumya Counseling: I discussed with the patient the risks of tildrakizumab including but not limited to immunosuppression, malignancy, posterior leukoencephalopathy syndrome, and serious infections.  The patient understands that monitoring is required including a PPD at baseline and must alert us or the primary physician if symptoms of infection or other concerning signs are noted. Include Pregnancy/Lactation Warning?: No Finasteride Female Counseling: Finasteride Counseling:  I discussed with the patient the risks of use of finasteride including but not limited to decreased libido and sexual dysfunction. Explained the teratogenic nature of the medication and stressed the importance of not getting pregnant during treatment. All of the patient's questions and concerns were addressed. Winlevi Pregnancy And Lactation Text: This medication is considered safe during pregnancy and breastfeeding. Klisyri Counseling:  I discussed with the patient the risks of Klisyri including but not limited to erythema, scaling, itching, weeping, crusting, and pain. Rinvoq Counseling: I discussed with the patient the risks of Rinvoq therapy including but not limited to upper respiratory tract infections, shingles, cold sores, bronchitis, nausea, cough, fever, acne, and headache. Live vaccines should be avoided.  This medication has been linked to serious infections; higher rate of mortality; malignancy and lymphoproliferative disorders; major adverse cardiovascular events; thrombosis; thrombocytopenia, anemia, and neutropenia; lipid elevations; liver enzyme elevations; and gastrointestinal perforations. Stelara Counseling:  I discussed with the patient the risks of ustekinumab including but not limited to immunosuppression, malignancy, posterior leukoencephalopathy syndrome, and serious infections.  The patient understands that monitoring is required including a PPD at baseline and must alert us or the primary physician if symptoms of infection or other concerning signs are noted. 5-Fu Counseling: 5-Fluorouracil Counseling:  I discussed with the patient the risks of 5-fluorouracil including but not limited to erythema, scaling, itching, weeping, crusting, and pain. Soolantra Counseling: I discussed with the patients the risks of topial Soolantra. This is a medicine which decreases the number of mites and inflammation in the skin. You experience burning, stinging, eye irritation or allergic reactions.  Please call our office if you develop any problems from using this medication. Fluconazole Counseling:  Patient counseled regarding adverse effects of fluconazole including but not limited to headache, diarrhea, nausea, upset stomach, liver function test abnormalities, taste disturbance, and stomach pain.  There is a rare possibility of liver failure that can occur when taking fluconazole.  The patient understands that monitoring of LFTs and kidney function test may be required, especially at baseline. The patient verbalized understanding of the proper use and possible adverse effects of fluconazole.  All of the patient's questions and concerns were addressed. Adbry Counseling: I discussed with the patient the risks of tralokinumab including but not limited to eye infection and irritation, cold sores, injection site reactions, worsening of asthma, allergic reactions and increased risk of parasitic infection.  Live vaccines should be avoided while taking tralokinumab. The patient understands that monitoring is required and they must alert us or the primary physician if symptoms of infection or other concerning signs are noted. Quinolones Counseling:  I discussed with the patient the risks of fluoroquinolones including but not limited to GI upset, allergic reaction, drug rash, diarrhea, dizziness, photosensitivity, yeast infections, liver function test abnormalities, tendonitis/tendon rupture. Soolantra Pregnancy And Lactation Text: This medication is Pregnancy Category C. This medication is considered safe during breast feeding. Rinvoq Pregnancy And Lactation Text: Based on animal studies, Rinvoq may cause embryo-fetal harm when administered to pregnant women.  The medication should not be used in pregnancy.  Breastfeeding is not recommended during treatment and for 6 days after the last dose. Semaglutide Counseling: I reviewed the possible side effects including: thyroid tumors, kidney disease, gallbladder disease, abdominal pain, constipation, diarrhea, nausea, vomiting and pancreatitis. Do not take this medication if you have a history or family history of multiple endocrine neoplasia syndrome type 2. Side effects reviewed, pt to contact office should one occur. Prednisone Counseling:  I discussed with the patient the risks of prolonged use of prednisone including but not limited to weight gain, insomnia, osteoporosis, mood changes, diabetes, susceptibility to infection, glaucoma and high blood pressure.  In cases where prednisone use is prolonged, patients should be monitored with blood pressure checks, serum glucose levels and an eye exam.  Additionally, the patient may need to be placed on GI prophylaxis, PCP prophylaxis, and calcium and vitamin D supplementation and/or a bisphosphonate.  The patient verbalized understanding of the proper use and the possible adverse effects of prednisone.  All of the patient's questions and concerns were addressed. Nsaids Counseling: NSAID Counseling: I discussed with the patient that NSAIDs should be taken with food. Prolonged use of NSAIDs can result in the development of stomach ulcers.  Patient advised to stop taking NSAIDs if abdominal pain occurs.  The patient verbalized understanding of the proper use and possible adverse effects of NSAIDs.  All of the patient's questions and concerns were addressed. Cimetidine Counseling:  I discussed with the patient the risks of Cimetidine including but not limited to gynecomastia, headache, diarrhea, nausea, drowsiness, arrhythmias, pancreatitis, skin rashes, psychosis, bone marrow suppression and kidney toxicity. Libtayo Counseling- I discussed with the patient the risks of Libtayo including but not limited to nausea, vomiting, diarrhea, and bone or muscle pain.  The patient verbalized understanding of the proper use and possible adverse effects of Libtayo.  All of the patient's questions and concerns were addressed. VTAMA Counseling: I discussed with the patient that VTAMA is not for use in the eyes, mouth or mouth. They should call the office if they develop any signs of allergic reactions to VTAMA. The patient verbalized understanding of the proper use and possible adverse effects of VTAMA.  All of the patient's questions and concerns were addressed. Klisyri Pregnancy And Lactation Text: It is unknown if this medication can harm a developing fetus or if it is excreted in breast milk. Isotretinoin Counseling: Patient should get monthly blood tests, not donate blood, not drive at night if vision affected, not share medication, and not undergo elective surgery for 6 months after tx completed. Side effects reviewed, pt to contact office should one occur. Valtrex Pregnancy And Lactation Text: this medication is Pregnancy Category B and is considered safe during pregnancy. This medication is not directly found in breast milk but it's metabolite acyclovir is present. Topical Retinoid counseling:  Patient advised to apply a pea-sized amount only at bedtime and wait 30 minutes after washing their face before applying.  If too drying, patient may add a non-comedogenic moisturizer. The patient verbalized understanding of the proper use and possible adverse effects of retinoids.  All of the patient's questions and concerns were addressed. Libtayo Pregnancy And Lactation Text: This medication is contraindicated in pregnancy and when breast feeding. Isotretinoin Pregnancy And Lactation Text: This medication is Pregnancy Category X and is considered extremely dangerous during pregnancy. It is unknown if it is excreted in breast milk. Griseofulvin Counseling:  I discussed with the patient the risks of griseofulvin including but not limited to photosensitivity, cytopenia, liver damage, nausea/vomiting and severe allergy.  The patient understands that this medication is best absorbed when taken with a fatty meal (e.g., ice cream or french fries). Adbry Pregnancy And Lactation Text: It is unknown if this medication will adversely affect pregnancy or breast feeding. Taltz Counseling: I discussed with the patient the risks of ixekizumab including but not limited to immunosuppression, serious infections, worsening of inflammatory bowel disease and drug reactions.  The patient understands that monitoring is required including a PPD at baseline and must alert us or the primary physician if symptoms of infection or other concerning signs are noted. Drysol Counseling:  I discussed with the patient the risks of drysol/aluminum chloride including but not limited to skin rash, itching, irritation, burning. Sotyktu Counseling:  I discussed the most common side effects of Sotyktu including: common cold, sore throat, sinus infections, cold sores, canker sores, folliculitis, and acne.  I also discussed more serious side effects of Sotyktu including but not limited to: serious allergic reactions; increased risk for infections such as TB; cancers such as lymphomas; rhabdomyolysis and elevated CPK; and elevated triglycerides and liver enzymes.  Nsaids Pregnancy And Lactation Text: These medications are considered safe up to 30 weeks gestation. It is excreted in breast milk. Minoxidil Counseling: Minoxidil is a topical medication which can increase blood flow where it is applied. It is uncertain how this medication increases hair growth. Side effects are uncommon and include stinging and allergic reactions. Bimzelx Counseling:  I discussed with the patient the risks of Bimzelx including but not limited to depression, immunosuppression, allergic reactions and infections.  The patient understands that monitoring is required including a PPD at baseline and must alert us or the primary physician if symptoms of infection or other concerning signs are noted. Infliximab Counseling:  I discussed with the patient the risks of infliximab including but not limited to myelosuppression, immunosuppression, autoimmune hepatitis, demyelinating diseases, lymphoma, and serious infections.  The patient understands that monitoring is required including a PPD at baseline and must alert us or the primary physician if symptoms of infection or other concerning signs are noted. Wegovy Counseling: I reviewed the possible side effects including: thyroid tumors, kidney disease, gallbladder disease, abdominal pain, constipation, diarrhea, nausea, vomiting and pancreatitis. Do not take this medication if you have a history or family history of multiple endocrine neoplasia syndrome type 2. Side effects reviewed, pt to contact office should one occur. High Dose Vitamin A Counseling: Side effects reviewed, pt to contact office should one occur. Sotyktu Pregnancy And Lactation Text: There is insufficient data to evaluate whether or not Sotyktu is safe to use during pregnancy.   It is not known if Sotyktu passes into breast milk and whether or not it is safe to use when breastfeeding.   Doxepin Counseling:  Patient advised that the medication is sedating and not to drive a car after taking this medication. Patient informed of potential adverse effects including but not limited to dry mouth, urinary retention, and blurry vision.  The patient verbalized understanding of the proper use and possible adverse effects of doxepin.  All of the patient's questions and concerns were addressed. Azithromycin Counseling:  I discussed with the patient the risks of azithromycin including but not limited to GI upset, allergic reaction, drug rash, diarrhea, and yeast infections. Griseofulvin Pregnancy And Lactation Text: This medication is Pregnancy Category X and is known to cause serious birth defects. It is unknown if this medication is excreted in breast milk but breast feeding should be avoided. Olanzapine Counseling- I discussed with the patient the common side effects of olanzapine including but are not limited to: lack of energy, dry mouth, increased appetite, sleepiness, tremor, constipation, dizziness, changes in behavior, or restlessness.  Explained that teenagers are more likely to experience headaches, abdominal pain, pain in the arms or legs, tiredness, and sleepiness.  Serious side effects include but are not limited: increased risk of death in elderly patients who are confused, have memory loss, or dementia-related psychosis; hyperglycemia; increased cholesterol and triglycerides; and weight gain. Odomzo Counseling- I discussed with the patient the risks of Odomzo including but not limited to nausea, vomiting, diarrhea, constipation, weight loss, changes in the sense of taste, decreased appetite, muscle spasms, and hair loss.  The patient verbalized understanding of the proper use and possible adverse effects of Odomzo.  All of the patient's questions and concerns were addressed. Rifampin Counseling: I discussed with the patient the risks of rifampin including but not limited to liver damage, kidney damage, red-orange body fluids, nausea/vomiting and severe allergy. Olanzapine Pregnancy And Lactation Text: This medication is pregnancy category C.   There are no adequate and well controlled trials with olanzapine in pregnant females.  Olanzapine should be used during pregnancy only if the potential benefit justifies the potential risk to the fetus.   In a study in lactating healthy women, olanzapine was excreted in breast milk.  It is recommended that women taking olanzapine should not breast feed. Tremfya Counseling: I discussed with the patient the risks of guselkumab including but not limited to immunosuppression, serious infections, and drug reactions.  The patient understands that monitoring is required including a PPD at baseline and must alert us or the primary physician if symptoms of infection or other concerning signs are noted. Tazorac Counseling:  Patient advised that medication is irritating and drying.  Patient may need to apply sparingly and wash off after an hour before eventually leaving it on overnight.  The patient verbalized understanding of the proper use and possible adverse effects of tazorac.  All of the patient's questions and concerns were addressed. Rifampin Pregnancy And Lactation Text: This medication is Pregnancy Category C and it isn't know if it is safe during pregnancy. It is also excreted in breast milk and should not be used if you are breast feeding. Zoryve Counseling:  I discussed with the patient that Zoryve is not for use in the eyes, mouth or vagina. The most commonly reported side effects include diarrhea, headache, insomnia, application site pain, upper respiratory tract infections, and urinary tract infections.  All of the patient's questions and concerns were addressed.
